# Patient Record
Sex: FEMALE | Race: WHITE | HISPANIC OR LATINO | Employment: OTHER | URBAN - METROPOLITAN AREA
[De-identification: names, ages, dates, MRNs, and addresses within clinical notes are randomized per-mention and may not be internally consistent; named-entity substitution may affect disease eponyms.]

---

## 2017-03-05 ENCOUNTER — HOSPITAL ENCOUNTER (EMERGENCY)
Facility: HOSPITAL | Age: 72
Discharge: HOME | End: 2017-03-05
Attending: EMERGENCY MEDICINE | Admitting: EMERGENCY MEDICINE
Payer: MEDICARE

## 2017-03-05 ENCOUNTER — APPOINTMENT (EMERGENCY)
Dept: RADIOLOGY | Facility: HOSPITAL | Age: 72
End: 2017-03-05
Payer: MEDICARE

## 2017-03-05 VITALS
RESPIRATION RATE: 16 BRPM | HEART RATE: 72 BPM | DIASTOLIC BLOOD PRESSURE: 75 MMHG | SYSTOLIC BLOOD PRESSURE: 140 MMHG | OXYGEN SATURATION: 100 % | TEMPERATURE: 96.8 F | WEIGHT: 105 LBS

## 2017-03-05 DIAGNOSIS — M19.90 OSTEOARTHRITIS: Primary | ICD-10-CM

## 2017-03-05 PROCEDURE — 73700 CT LOWER EXTREMITY W/O DYE: CPT

## 2017-03-05 PROCEDURE — 73502 X-RAY EXAM HIP UNI 2-3 VIEWS: CPT

## 2017-03-05 PROCEDURE — 99284 EMERGENCY DEPT VISIT MOD MDM: CPT

## 2017-03-05 RX ORDER — NORTRIPTYLINE HYDROCHLORIDE 25 MG/1
25 CAPSULE ORAL
Status: ON HOLD | COMMUNITY
Start: 2015-03-13 | End: 2017-11-27 | Stop reason: CLARIF

## 2017-03-05 RX ORDER — GABAPENTIN 100 MG/1
100 CAPSULE ORAL 3 TIMES DAILY
COMMUNITY
Start: 2014-11-02 | End: 2018-05-22 | Stop reason: SDUPTHER

## 2017-03-05 RX ORDER — CYANOCOBALAMIN (VITAMIN B-12) 500 MCG
400 LOZENGE ORAL DAILY
COMMUNITY
Start: 2016-12-13 | End: 2022-05-02

## 2017-03-05 RX ORDER — FAMOTIDINE 20 MG/1
20 TABLET, FILM COATED ORAL EVERY 12 HOURS
COMMUNITY
Start: 2016-12-13 | End: 2017-08-21 | Stop reason: ALTCHOICE

## 2017-03-05 RX ORDER — NORTRIPTYLINE HYDROCHLORIDE 10 MG/1
10 CAPSULE ORAL
Status: ON HOLD | COMMUNITY
Start: 2015-03-13 | End: 2017-11-27 | Stop reason: CLARIF

## 2017-03-05 RX ORDER — LEVOTHYROXINE SODIUM 0.03 MG/1
25 TABLET ORAL DAILY
COMMUNITY
Start: 2016-12-13 | End: 2018-02-21 | Stop reason: SDUPTHER

## 2017-03-05 RX ORDER — DIAZEPAM 5 MG/1
5 TABLET ORAL
Status: ON HOLD | COMMUNITY
Start: 2014-11-02 | End: 2017-11-27 | Stop reason: CLARIF

## 2017-03-05 RX ORDER — OXYCODONE HYDROCHLORIDE AND ACETAMINOPHEN 5; 325 MG/1; MG/1
1 TABLET ORAL ONCE
Status: COMPLETED | OUTPATIENT
Start: 2017-03-05 | End: 2017-03-05

## 2017-03-05 RX ORDER — OXYCODONE HYDROCHLORIDE AND ACETAMINOPHEN 5; 325 MG/1; MG/1
1 TABLET ORAL EVERY 4 HOURS PRN
Qty: 15 TABLET | Refills: 0 | Status: SHIPPED | OUTPATIENT
Start: 2017-03-05 | End: 2017-03-15

## 2017-03-05 RX ORDER — FOLIC ACID 1 MG/1
1 TABLET ORAL DAILY
COMMUNITY
Start: 2015-04-04 | End: 2018-04-10 | Stop reason: SDUPTHER

## 2017-03-05 RX ORDER — LIDOCAINE 50 MG/G
1 PATCH TOPICAL EVERY 24 HOURS
Qty: 30 PATCH | Refills: 0 | Status: SHIPPED | OUTPATIENT
Start: 2017-03-05 | End: 2017-08-29

## 2017-03-05 RX ORDER — RIBOFLAVIN (VITAMIN B2) 100 MG
100 TABLET ORAL DAILY
COMMUNITY
Start: 2016-12-13 | End: 2017-08-21 | Stop reason: DRUGHIGH

## 2017-03-05 RX ORDER — IBUPROFEN 200 MG
600 TABLET ORAL EVERY 8 HOURS PRN
COMMUNITY
Start: 2016-12-13 | End: 2018-05-16

## 2017-03-05 RX ORDER — LIDOCAINE 50 MG/G
1 PATCH TOPICAL ONCE
Status: DISCONTINUED | OUTPATIENT
Start: 2017-03-05 | End: 2017-03-05 | Stop reason: HOSPADM

## 2017-03-05 RX ADMIN — OXYCODONE HYDROCHLORIDE AND ACETAMINOPHEN 1 TABLET: 5; 325 TABLET ORAL at 07:59

## 2017-03-05 RX ADMIN — LIDOCAINE 1 PATCH: 50 PATCH CUTANEOUS at 07:11

## 2017-04-17 ENCOUNTER — HOSPITAL ENCOUNTER (OUTPATIENT)
Dept: RADIOLOGY | Age: 72
Discharge: HOME | End: 2017-04-17
Payer: MEDICARE

## 2017-04-17 DIAGNOSIS — Z13.820 ENCOUNTER FOR SCREENING FOR OSTEOPOROSIS: ICD-10-CM

## 2017-04-17 PROCEDURE — 77080 DXA BONE DENSITY AXIAL: CPT

## 2017-04-20 ENCOUNTER — ALLSCRIPTS OFFICE VISIT (OUTPATIENT)
Dept: OTHER | Facility: OTHER | Age: 72
End: 2017-04-20

## 2017-04-20 ENCOUNTER — TRANSCRIBE ORDERS (OUTPATIENT)
Dept: ADMINISTRATIVE | Facility: HOSPITAL | Age: 72
End: 2017-04-20

## 2017-04-20 DIAGNOSIS — M25.552 LEFT HIP PAIN: Primary | ICD-10-CM

## 2017-04-25 ENCOUNTER — APPOINTMENT (OUTPATIENT)
Dept: LAB | Facility: HOSPITAL | Age: 72
End: 2017-04-25
Payer: MEDICARE

## 2017-04-25 ENCOUNTER — GENERIC CONVERSION - ENCOUNTER (OUTPATIENT)
Dept: OTHER | Facility: OTHER | Age: 72
End: 2017-04-25

## 2017-04-25 ENCOUNTER — TRANSCRIBE ORDERS (OUTPATIENT)
Dept: LAB | Facility: HOSPITAL | Age: 72
End: 2017-04-25

## 2017-04-25 DIAGNOSIS — Z13.820 ENCOUNTER FOR SCREENING FOR OSTEOPOROSIS: ICD-10-CM

## 2017-04-25 DIAGNOSIS — E03.9 HYPOTHYROIDISM: ICD-10-CM

## 2017-04-25 DIAGNOSIS — I73.9 PERIPHERAL VASCULAR DISEASE (HCC): ICD-10-CM

## 2017-04-25 DIAGNOSIS — F41.9 ANXIETY DISORDER: ICD-10-CM

## 2017-04-25 DIAGNOSIS — E05.90 THYROTOXICOSIS WITHOUT THYROID STORM: ICD-10-CM

## 2017-04-25 DIAGNOSIS — K21.9 GASTRO-ESOPHAGEAL REFLUX DISEASE WITHOUT ESOPHAGITIS: ICD-10-CM

## 2017-04-25 LAB
ALBUMIN SERPL BCP-MCNC: 3.4 G/DL (ref 3.5–5)
ALP SERPL-CCNC: 130 U/L (ref 46–116)
ALT SERPL W P-5'-P-CCNC: 24 U/L (ref 12–78)
ANION GAP SERPL CALCULATED.3IONS-SCNC: 8 MMOL/L (ref 4–13)
AST SERPL W P-5'-P-CCNC: 15 U/L (ref 5–45)
BASOPHILS # BLD AUTO: 0.01 THOUSANDS/ΜL (ref 0–0.1)
BASOPHILS NFR BLD AUTO: 0 % (ref 0–1)
BILIRUB SERPL-MCNC: 0.25 MG/DL (ref 0.2–1)
BUN SERPL-MCNC: 10 MG/DL (ref 5–25)
CALCIUM SERPL-MCNC: 9 MG/DL (ref 8.3–10.1)
CHLORIDE SERPL-SCNC: 106 MMOL/L (ref 100–108)
CHOLEST SERPL-MCNC: 242 MG/DL (ref 50–200)
CO2 SERPL-SCNC: 30 MMOL/L (ref 21–32)
CREAT SERPL-MCNC: 0.55 MG/DL (ref 0.6–1.3)
EOSINOPHIL # BLD AUTO: 0.06 THOUSAND/ΜL (ref 0–0.61)
EOSINOPHIL NFR BLD AUTO: 1 % (ref 0–6)
ERYTHROCYTE [DISTWIDTH] IN BLOOD BY AUTOMATED COUNT: 14.4 % (ref 11.6–15.1)
GFR SERPL CREATININE-BSD FRML MDRD: >60 ML/MIN/1.73SQ M
GLUCOSE P FAST SERPL-MCNC: 83 MG/DL (ref 65–99)
HCT VFR BLD AUTO: 41 % (ref 34.8–46.1)
HDLC SERPL-MCNC: 98 MG/DL (ref 40–60)
HGB BLD-MCNC: 12.9 G/DL (ref 11.5–15.4)
LDLC SERPL CALC-MCNC: 125 MG/DL (ref 0–100)
LYMPHOCYTES # BLD AUTO: 1.56 THOUSANDS/ΜL (ref 0.6–4.47)
LYMPHOCYTES NFR BLD AUTO: 24 % (ref 14–44)
MCH RBC QN AUTO: 27.7 PG (ref 26.8–34.3)
MCHC RBC AUTO-ENTMCNC: 31.5 G/DL (ref 31.4–37.4)
MCV RBC AUTO: 88 FL (ref 82–98)
MONOCYTES # BLD AUTO: 0.67 THOUSAND/ΜL (ref 0.17–1.22)
MONOCYTES NFR BLD AUTO: 10 % (ref 4–12)
NEUTROPHILS # BLD AUTO: 4.19 THOUSANDS/ΜL (ref 1.85–7.62)
NEUTS SEG NFR BLD AUTO: 65 % (ref 43–75)
NRBC BLD AUTO-RTO: 0 /100 WBCS
PLATELET # BLD AUTO: 319 THOUSANDS/UL (ref 149–390)
PMV BLD AUTO: 9.2 FL (ref 8.9–12.7)
POTASSIUM SERPL-SCNC: 4.3 MMOL/L (ref 3.5–5.3)
PROT SERPL-MCNC: 7 G/DL (ref 6.4–8.2)
RBC # BLD AUTO: 4.65 MILLION/UL (ref 3.81–5.12)
SODIUM SERPL-SCNC: 144 MMOL/L (ref 136–145)
T4 FREE SERPL-MCNC: 0.86 NG/DL (ref 0.76–1.46)
TRIGL SERPL-MCNC: 94 MG/DL
TSH SERPL DL<=0.05 MIU/L-ACNC: 4.92 UIU/ML (ref 0.36–3.74)
WBC # BLD AUTO: 6.5 THOUSAND/UL (ref 4.31–10.16)

## 2017-04-25 PROCEDURE — 85025 COMPLETE CBC W/AUTO DIFF WBC: CPT

## 2017-04-25 PROCEDURE — 80061 LIPID PANEL: CPT

## 2017-04-25 PROCEDURE — 84439 ASSAY OF FREE THYROXINE: CPT

## 2017-04-25 PROCEDURE — 84443 ASSAY THYROID STIM HORMONE: CPT

## 2017-04-25 PROCEDURE — 80053 COMPREHEN METABOLIC PANEL: CPT

## 2017-04-25 PROCEDURE — 36415 COLL VENOUS BLD VENIPUNCTURE: CPT

## 2017-04-27 ENCOUNTER — HOSPITAL ENCOUNTER (OUTPATIENT)
Dept: RADIOLOGY | Facility: HOSPITAL | Age: 72
Discharge: HOME | End: 2017-04-27
Payer: MEDICARE

## 2017-04-27 DIAGNOSIS — M25.552 LEFT HIP PAIN: ICD-10-CM

## 2017-04-27 PROCEDURE — 20610 DRAIN/INJ JOINT/BURSA W/O US: CPT

## 2017-04-27 PROCEDURE — 77002 NEEDLE LOCALIZATION BY XRAY: CPT

## 2017-04-27 RX ORDER — LIDOCAINE HYDROCHLORIDE 10 MG/ML
20 INJECTION, SOLUTION INFILTRATION; PERINEURAL
Status: DISCONTINUED | OUTPATIENT
Start: 2017-04-27 | End: 2017-04-28 | Stop reason: HOSPADM

## 2017-04-27 RX ORDER — BUPIVACAINE HYDROCHLORIDE 2.5 MG/ML
20 INJECTION, SOLUTION EPIDURAL; INFILTRATION; INTRACAUDAL
Status: DISCONTINUED | OUTPATIENT
Start: 2017-04-27 | End: 2017-04-28 | Stop reason: HOSPADM

## 2017-04-27 RX ORDER — SODIUM BICARBONATE 42 MG/ML
2.4 INJECTION, SOLUTION INTRAVENOUS
Status: DISCONTINUED | OUTPATIENT
Start: 2017-04-27 | End: 2017-04-28 | Stop reason: HOSPADM

## 2017-04-27 RX ADMIN — IOHEXOL 2 ML: 240 INJECTION, SOLUTION INTRATHECAL; INTRAVASCULAR; INTRAVENOUS; ORAL at 13:15

## 2017-05-16 ENCOUNTER — ALLSCRIPTS OFFICE VISIT (OUTPATIENT)
Dept: OTHER | Facility: OTHER | Age: 72
End: 2017-05-16

## 2017-05-16 DIAGNOSIS — F41.9 ANXIETY DISORDER: ICD-10-CM

## 2017-05-16 DIAGNOSIS — Z86.39 PERSONAL HISTORY OF OTHER ENDOCRINE, NUTRITIONAL AND METABOLIC DISEASE: ICD-10-CM

## 2017-05-16 DIAGNOSIS — E03.9 HYPOTHYROIDISM: ICD-10-CM

## 2017-05-16 DIAGNOSIS — M81.0 AGE-RELATED OSTEOPOROSIS WITHOUT CURRENT PATHOLOGICAL FRACTURE: ICD-10-CM

## 2017-05-16 DIAGNOSIS — K21.9 GASTRO-ESOPHAGEAL REFLUX DISEASE WITHOUT ESOPHAGITIS: ICD-10-CM

## 2017-05-25 ENCOUNTER — TRANSCRIBE ORDERS (OUTPATIENT)
Dept: ADMINISTRATIVE | Facility: HOSPITAL | Age: 72
End: 2017-05-25

## 2017-05-25 ENCOUNTER — ALLSCRIPTS OFFICE VISIT (OUTPATIENT)
Dept: OTHER | Facility: OTHER | Age: 72
End: 2017-05-25

## 2017-05-25 DIAGNOSIS — M25.552 LEFT HIP PAIN: Primary | ICD-10-CM

## 2017-07-06 ENCOUNTER — HOSPITAL ENCOUNTER (OUTPATIENT)
Dept: RADIOLOGY | Facility: HOSPITAL | Age: 72
Discharge: HOME | End: 2017-07-06
Attending: ORTHOPAEDIC SURGERY
Payer: MEDICARE

## 2017-07-06 DIAGNOSIS — M25.552 LEFT HIP PAIN: ICD-10-CM

## 2017-07-06 PROCEDURE — 20610 DRAIN/INJ JOINT/BURSA W/O US: CPT

## 2017-07-06 PROCEDURE — 77002 NEEDLE LOCALIZATION BY XRAY: CPT

## 2017-07-06 RX ORDER — BUPIVACAINE HYDROCHLORIDE 2.5 MG/ML
20 INJECTION, SOLUTION EPIDURAL; INFILTRATION; INTRACAUDAL ONCE
Status: CANCELLED | OUTPATIENT
Start: 2017-07-06 | End: 2017-07-06

## 2017-07-06 RX ORDER — METHYLPREDNISOLONE ACETATE 80 MG/ML
80 INJECTION, SUSPENSION INTRA-ARTICULAR; INTRALESIONAL; INTRAMUSCULAR; SOFT TISSUE ONCE
Status: CANCELLED | OUTPATIENT
Start: 2017-07-06 | End: 2017-07-06

## 2017-07-06 RX ORDER — LIDOCAINE HYDROCHLORIDE 10 MG/ML
5 INJECTION, SOLUTION EPIDURAL; INFILTRATION; INTRACAUDAL; PERINEURAL ONCE
Status: CANCELLED | OUTPATIENT
Start: 2017-07-06 | End: 2017-07-06

## 2017-07-27 ENCOUNTER — GENERIC CONVERSION - ENCOUNTER (OUTPATIENT)
Dept: OTHER | Facility: OTHER | Age: 72
End: 2017-07-27

## 2017-07-27 ENCOUNTER — ALLSCRIPTS OFFICE VISIT (OUTPATIENT)
Dept: OTHER | Facility: OTHER | Age: 72
End: 2017-07-27

## 2017-07-27 DIAGNOSIS — M25.552 PAIN IN LEFT HIP: ICD-10-CM

## 2017-07-27 DIAGNOSIS — M16.12 PRIMARY OSTEOARTHRITIS OF LEFT HIP: ICD-10-CM

## 2017-08-21 ENCOUNTER — ALLSCRIPTS OFFICE VISIT (OUTPATIENT)
Dept: OTHER | Facility: OTHER | Age: 72
End: 2017-08-21

## 2017-08-22 ENCOUNTER — APPOINTMENT (OUTPATIENT)
Dept: LAB | Facility: HOSPITAL | Age: 72
End: 2017-08-22
Attending: ORTHOPAEDIC SURGERY
Payer: MEDICARE

## 2017-08-22 ENCOUNTER — TRANSCRIBE ORDERS (OUTPATIENT)
Dept: LAB | Facility: HOSPITAL | Age: 72
End: 2017-08-22

## 2017-08-22 ENCOUNTER — APPOINTMENT (OUTPATIENT)
Dept: PREADMISSION TESTING | Facility: HOSPITAL | Age: 72
End: 2017-08-22
Payer: MEDICARE

## 2017-08-22 ENCOUNTER — HOSPITAL ENCOUNTER (OUTPATIENT)
Dept: RADIOLOGY | Facility: HOSPITAL | Age: 72
Discharge: HOME | End: 2017-08-22
Attending: ORTHOPAEDIC SURGERY
Payer: MEDICARE

## 2017-08-22 DIAGNOSIS — M25.552 PAIN IN LEFT HIP: ICD-10-CM

## 2017-08-22 DIAGNOSIS — M16.12 PRIMARY OSTEOARTHRITIS OF LEFT HIP: ICD-10-CM

## 2017-08-22 DIAGNOSIS — M16.12 PRIMARY OSTEOARTHRITIS OF LEFT HIP: Primary | ICD-10-CM

## 2017-08-22 LAB
ABO GROUP BLD: NORMAL
ALBUMIN SERPL BCP-MCNC: 3.6 G/DL (ref 3.5–5)
ALP SERPL-CCNC: 123 U/L (ref 46–116)
ALT SERPL W P-5'-P-CCNC: 22 U/L (ref 12–78)
ANION GAP SERPL CALCULATED.3IONS-SCNC: 6 MMOL/L (ref 4–13)
APTT PPP: 27 SECONDS (ref 23–35)
AST SERPL W P-5'-P-CCNC: 22 U/L (ref 5–45)
BACTERIA UR QL AUTO: ABNORMAL /HPF
BASOPHILS # BLD AUTO: 0.01 THOUSANDS/ΜL (ref 0–0.1)
BASOPHILS NFR BLD AUTO: 0 % (ref 0–1)
BILIRUB SERPL-MCNC: 0.36 MG/DL (ref 0.2–1)
BILIRUB UR QL STRIP: NEGATIVE
BLD GP AB SCN SERPL QL: NEGATIVE
BUN SERPL-MCNC: 9 MG/DL (ref 5–25)
CALCIUM SERPL-MCNC: 8.9 MG/DL (ref 8.3–10.1)
CHLORIDE SERPL-SCNC: 104 MMOL/L (ref 100–108)
CLARITY UR: CLEAR
CO2 SERPL-SCNC: 29 MMOL/L (ref 21–32)
COLOR UR: YELLOW
CREAT SERPL-MCNC: 0.54 MG/DL (ref 0.6–1.3)
EOSINOPHIL # BLD AUTO: 0.07 THOUSAND/ΜL (ref 0–0.61)
EOSINOPHIL NFR BLD AUTO: 1 % (ref 0–6)
ERYTHROCYTE [DISTWIDTH] IN BLOOD BY AUTOMATED COUNT: 14.8 % (ref 11.6–15.1)
EST. AVERAGE GLUCOSE BLD GHB EST-MCNC: 108 MG/DL
GFR SERPL CREATININE-BSD FRML MDRD: 94 ML/MIN/1.73SQ M
GLUCOSE P FAST SERPL-MCNC: 81 MG/DL (ref 65–99)
GLUCOSE UR STRIP-MCNC: NEGATIVE MG/DL
HBA1C MFR BLD: 5.4 % (ref 4.2–6.3)
HCT VFR BLD AUTO: 39.1 % (ref 34.8–46.1)
HGB BLD-MCNC: 12.6 G/DL (ref 11.5–15.4)
HGB UR QL STRIP.AUTO: NEGATIVE
HYALINE CASTS #/AREA URNS LPF: ABNORMAL /LPF
INR PPP: 0.94 (ref 0.86–1.16)
KETONES UR STRIP-MCNC: NEGATIVE MG/DL
LEUKOCYTE ESTERASE UR QL STRIP: ABNORMAL
LYMPHOCYTES # BLD AUTO: 1.72 THOUSANDS/ΜL (ref 0.6–4.47)
LYMPHOCYTES NFR BLD AUTO: 22 % (ref 14–44)
MCH RBC QN AUTO: 27.7 PG (ref 26.8–34.3)
MCHC RBC AUTO-ENTMCNC: 32.2 G/DL (ref 31.4–37.4)
MCV RBC AUTO: 86 FL (ref 82–98)
MONOCYTES # BLD AUTO: 0.86 THOUSAND/ΜL (ref 0.17–1.22)
MONOCYTES NFR BLD AUTO: 11 % (ref 4–12)
NEUTROPHILS # BLD AUTO: 5.13 THOUSANDS/ΜL (ref 1.85–7.62)
NEUTS SEG NFR BLD AUTO: 66 % (ref 43–75)
NITRITE UR QL STRIP: NEGATIVE
NON-SQ EPI CELLS URNS QL MICRO: ABNORMAL /HPF
NRBC BLD AUTO-RTO: 0 /100 WBCS
PH UR STRIP.AUTO: 7.5 [PH] (ref 4.5–8)
PLATELET # BLD AUTO: 332 THOUSANDS/UL (ref 149–390)
PMV BLD AUTO: 8.9 FL (ref 8.9–12.7)
POTASSIUM SERPL-SCNC: 4.2 MMOL/L (ref 3.5–5.3)
PROT SERPL-MCNC: 7.3 G/DL (ref 6.4–8.2)
PROT UR STRIP-MCNC: NEGATIVE MG/DL
PROTHROMBIN TIME: 12.6 SECONDS (ref 12.1–14.4)
RBC # BLD AUTO: 4.55 MILLION/UL (ref 3.81–5.12)
RBC #/AREA URNS AUTO: ABNORMAL /HPF
RH BLD: POSITIVE
SODIUM SERPL-SCNC: 139 MMOL/L (ref 136–145)
SP GR UR STRIP.AUTO: 1.01 (ref 1–1.03)
SPECIMEN EXPIRATION DATE: NORMAL
UROBILINOGEN UR QL STRIP.AUTO: 0.2 E.U./DL
WBC # BLD AUTO: 7.8 THOUSAND/UL (ref 4.31–10.16)
WBC #/AREA URNS AUTO: ABNORMAL /HPF

## 2017-08-22 PROCEDURE — 36415 COLL VENOUS BLD VENIPUNCTURE: CPT

## 2017-08-22 PROCEDURE — 81001 URINALYSIS AUTO W/SCOPE: CPT

## 2017-08-22 PROCEDURE — 71020 HB CHEST X-RAY 2VW FRONTAL&LATL: CPT

## 2017-08-22 PROCEDURE — 85730 THROMBOPLASTIN TIME PARTIAL: CPT

## 2017-08-22 PROCEDURE — 86850 RBC ANTIBODY SCREEN: CPT

## 2017-08-22 PROCEDURE — 80053 COMPREHEN METABOLIC PANEL: CPT

## 2017-08-22 PROCEDURE — 85610 PROTHROMBIN TIME: CPT

## 2017-08-22 PROCEDURE — 86900 BLOOD TYPING SEROLOGIC ABO: CPT

## 2017-08-22 PROCEDURE — 85025 COMPLETE CBC W/AUTO DIFF WBC: CPT

## 2017-08-22 PROCEDURE — 86901 BLOOD TYPING SEROLOGIC RH(D): CPT

## 2017-08-22 PROCEDURE — 83036 HEMOGLOBIN GLYCOSYLATED A1C: CPT

## 2017-08-29 ENCOUNTER — APPOINTMENT (EMERGENCY)
Dept: RADIOLOGY | Facility: HOSPITAL | Age: 72
DRG: 690 | End: 2017-08-29
Payer: MEDICARE

## 2017-08-29 ENCOUNTER — HOSPITAL ENCOUNTER (INPATIENT)
Facility: HOSPITAL | Age: 72
LOS: 1 days | Discharge: HOME HEALTH | DRG: 690 | End: 2017-09-01
Attending: EMERGENCY MEDICINE | Admitting: HOSPITALIST
Payer: MEDICARE

## 2017-08-29 DIAGNOSIS — R42 VERTIGO: Primary | ICD-10-CM

## 2017-08-29 LAB
ALBUMIN SERPL BCP-MCNC: 3.5 G/DL (ref 3.5–5)
ALP SERPL-CCNC: 132 U/L (ref 46–116)
ALT SERPL W P-5'-P-CCNC: 23 U/L (ref 12–78)
ANION GAP SERPL CALCULATED.3IONS-SCNC: 4 MMOL/L (ref 4–13)
AST SERPL W P-5'-P-CCNC: 20 U/L (ref 5–45)
BASOPHILS # BLD AUTO: 0.02 THOUSANDS/ΜL (ref 0–0.1)
BASOPHILS NFR BLD AUTO: 0 % (ref 0–1)
BILIRUB SERPL-MCNC: 0.3 MG/DL (ref 0.2–1)
BUN SERPL-MCNC: 11 MG/DL (ref 5–25)
CALCIUM SERPL-MCNC: 9.4 MG/DL (ref 8.3–10.1)
CHLORIDE SERPL-SCNC: 104 MMOL/L (ref 100–108)
CO2 SERPL-SCNC: 31 MMOL/L (ref 21–32)
CREAT SERPL-MCNC: 0.62 MG/DL (ref 0.6–1.3)
EOSINOPHIL # BLD AUTO: 0.02 THOUSAND/ΜL (ref 0–0.61)
EOSINOPHIL NFR BLD AUTO: 0 % (ref 0–6)
ERYTHROCYTE [DISTWIDTH] IN BLOOD BY AUTOMATED COUNT: 14.6 % (ref 11.6–15.1)
GFR SERPL CREATININE-BSD FRML MDRD: 90 ML/MIN/1.73SQ M
GLUCOSE SERPL-MCNC: 120 MG/DL (ref 65–140)
HCT VFR BLD AUTO: 39.6 % (ref 34.8–46.1)
HGB BLD-MCNC: 12.9 G/DL (ref 11.5–15.4)
LIPASE SERPL-CCNC: 151 U/L (ref 73–393)
LYMPHOCYTES # BLD AUTO: 0.88 THOUSANDS/ΜL (ref 0.6–4.47)
LYMPHOCYTES NFR BLD AUTO: 9 % (ref 14–44)
MCH RBC QN AUTO: 28 PG (ref 26.8–34.3)
MCHC RBC AUTO-ENTMCNC: 32.6 G/DL (ref 31.4–37.4)
MCV RBC AUTO: 86 FL (ref 82–98)
MONOCYTES # BLD AUTO: 0.51 THOUSAND/ΜL (ref 0.17–1.22)
MONOCYTES NFR BLD AUTO: 5 % (ref 4–12)
NEUTROPHILS # BLD AUTO: 8.37 THOUSANDS/ΜL (ref 1.85–7.62)
NEUTS SEG NFR BLD AUTO: 86 % (ref 43–75)
NRBC BLD AUTO-RTO: 0 /100 WBCS
PLATELET # BLD AUTO: 299 THOUSANDS/UL (ref 149–390)
PMV BLD AUTO: 9 FL (ref 8.9–12.7)
POTASSIUM SERPL-SCNC: 4 MMOL/L (ref 3.5–5.3)
PROT SERPL-MCNC: 7.3 G/DL (ref 6.4–8.2)
RBC # BLD AUTO: 4.6 MILLION/UL (ref 3.81–5.12)
SODIUM SERPL-SCNC: 139 MMOL/L (ref 136–145)
TROPONIN I SERPL-MCNC: <0.02 NG/ML
WBC # BLD AUTO: 9.82 THOUSAND/UL (ref 4.31–10.16)

## 2017-08-29 PROCEDURE — 36415 COLL VENOUS BLD VENIPUNCTURE: CPT | Performed by: STUDENT IN AN ORGANIZED HEALTH CARE EDUCATION/TRAINING PROGRAM

## 2017-08-29 PROCEDURE — 80053 COMPREHEN METABOLIC PANEL: CPT | Performed by: STUDENT IN AN ORGANIZED HEALTH CARE EDUCATION/TRAINING PROGRAM

## 2017-08-29 PROCEDURE — 83690 ASSAY OF LIPASE: CPT | Performed by: STUDENT IN AN ORGANIZED HEALTH CARE EDUCATION/TRAINING PROGRAM

## 2017-08-29 PROCEDURE — 96374 THER/PROPH/DIAG INJ IV PUSH: CPT

## 2017-08-29 PROCEDURE — 71020 HB CHEST X-RAY 2VW FRONTAL&LATL: CPT

## 2017-08-29 PROCEDURE — 70498 CT ANGIOGRAPHY NECK: CPT

## 2017-08-29 PROCEDURE — 85025 COMPLETE CBC W/AUTO DIFF WBC: CPT | Performed by: STUDENT IN AN ORGANIZED HEALTH CARE EDUCATION/TRAINING PROGRAM

## 2017-08-29 PROCEDURE — 93005 ELECTROCARDIOGRAM TRACING: CPT | Performed by: STUDENT IN AN ORGANIZED HEALTH CARE EDUCATION/TRAINING PROGRAM

## 2017-08-29 PROCEDURE — 84484 ASSAY OF TROPONIN QUANT: CPT | Performed by: STUDENT IN AN ORGANIZED HEALTH CARE EDUCATION/TRAINING PROGRAM

## 2017-08-29 PROCEDURE — 70496 CT ANGIOGRAPHY HEAD: CPT

## 2017-08-29 RX ORDER — MECLIZINE HYDROCHLORIDE 25 MG/1
25 TABLET ORAL ONCE
Status: COMPLETED | OUTPATIENT
Start: 2017-08-29 | End: 2017-08-29

## 2017-08-29 RX ORDER — IBUPROFEN 400 MG/1
400 TABLET ORAL ONCE
Status: COMPLETED | OUTPATIENT
Start: 2017-08-29 | End: 2017-08-29

## 2017-08-29 RX ORDER — ONDANSETRON 2 MG/ML
4 INJECTION INTRAMUSCULAR; INTRAVENOUS ONCE
Status: COMPLETED | OUTPATIENT
Start: 2017-08-29 | End: 2017-08-29

## 2017-08-29 RX ADMIN — IOHEXOL 85 ML: 350 INJECTION, SOLUTION INTRAVENOUS at 23:07

## 2017-08-29 RX ADMIN — IBUPROFEN 400 MG: 400 TABLET, FILM COATED ORAL at 22:33

## 2017-08-29 RX ADMIN — MECLIZINE HYDROCHLORIDE 25 MG: 25 TABLET ORAL at 21:45

## 2017-08-29 RX ADMIN — ONDANSETRON 4 MG: 2 INJECTION INTRAMUSCULAR; INTRAVENOUS at 21:45

## 2017-08-30 ENCOUNTER — APPOINTMENT (OUTPATIENT)
Dept: RADIOLOGY | Facility: HOSPITAL | Age: 72
DRG: 690 | End: 2017-08-30
Payer: MEDICARE

## 2017-08-30 ENCOUNTER — APPOINTMENT (OUTPATIENT)
Dept: NON INVASIVE DIAGNOSTICS | Facility: HOSPITAL | Age: 72
DRG: 690 | End: 2017-08-30
Payer: MEDICARE

## 2017-08-30 PROBLEM — R42 DIZZINESS: Status: ACTIVE | Noted: 2017-08-30

## 2017-08-30 PROBLEM — G25.81 RLS (RESTLESS LEGS SYNDROME): Status: ACTIVE | Noted: 2017-08-30

## 2017-08-30 PROBLEM — K21.9 GERD (GASTROESOPHAGEAL REFLUX DISEASE): Status: ACTIVE | Noted: 2017-08-30

## 2017-08-30 PROBLEM — F41.9 ANXIETY: Status: ACTIVE | Noted: 2017-08-30

## 2017-08-30 PROBLEM — I73.9 PVD (PERIPHERAL VASCULAR DISEASE) (HCC): Status: ACTIVE | Noted: 2017-08-30

## 2017-08-30 PROBLEM — R10.13 EPIGASTRIC DISCOMFORT: Status: ACTIVE | Noted: 2017-08-30

## 2017-08-30 PROBLEM — F41.9 ANXIETY: Chronic | Status: ACTIVE | Noted: 2017-08-30

## 2017-08-30 PROBLEM — I73.9 PVD (PERIPHERAL VASCULAR DISEASE) (HCC): Chronic | Status: ACTIVE | Noted: 2017-08-30

## 2017-08-30 PROBLEM — N39.0 URINARY TRACT INFECTION: Status: ACTIVE | Noted: 2017-08-30

## 2017-08-30 LAB
ANION GAP SERPL CALCULATED.3IONS-SCNC: 8 MMOL/L (ref 4–13)
ATRIAL RATE: 64 BPM
BACTERIA UR QL AUTO: ABNORMAL /HPF
BILIRUB UR QL STRIP: NEGATIVE
BUN SERPL-MCNC: 8 MG/DL (ref 5–25)
CALCIUM SERPL-MCNC: 9 MG/DL (ref 8.3–10.1)
CHLORIDE SERPL-SCNC: 102 MMOL/L (ref 100–108)
CLARITY UR: ABNORMAL
CO2 SERPL-SCNC: 28 MMOL/L (ref 21–32)
COLOR UR: YELLOW
COLOR, POC: NORMAL
CREAT SERPL-MCNC: 0.63 MG/DL (ref 0.6–1.3)
GFR SERPL CREATININE-BSD FRML MDRD: 89 ML/MIN/1.73SQ M
GLUCOSE SERPL-MCNC: 118 MG/DL (ref 65–140)
GLUCOSE UR STRIP-MCNC: NEGATIVE MG/DL
HGB UR QL STRIP.AUTO: ABNORMAL
HYALINE CASTS #/AREA URNS LPF: ABNORMAL /LPF
KETONES UR STRIP-MCNC: NEGATIVE MG/DL
LEUKOCYTE ESTERASE UR QL STRIP: ABNORMAL
NITRITE UR QL STRIP: POSITIVE
NON-SQ EPI CELLS URNS QL MICRO: ABNORMAL /HPF
P AXIS: 69 DEGREES
PH UR STRIP.AUTO: 8.5 [PH] (ref 4.5–8)
POTASSIUM SERPL-SCNC: 4.1 MMOL/L (ref 3.5–5.3)
PR INTERVAL: 128 MS
PROT UR STRIP-MCNC: NEGATIVE MG/DL
QRS AXIS: 87 DEGREES
QRSD INTERVAL: 120 MS
QT INTERVAL: 434 MS
QTC INTERVAL: 447 MS
RBC #/AREA URNS AUTO: ABNORMAL /HPF
SODIUM SERPL-SCNC: 138 MMOL/L (ref 136–145)
SP GR UR STRIP.AUTO: 1.01 (ref 1–1.03)
T WAVE AXIS: 69 DEGREES
TROPONIN I SERPL-MCNC: <0.02 NG/ML
TROPONIN I SERPL-MCNC: <0.02 NG/ML
TSH SERPL DL<=0.05 MIU/L-ACNC: 6.51 UIU/ML (ref 0.36–3.74)
UROBILINOGEN UR QL STRIP.AUTO: 0.2 E.U./DL
VENTRICULAR RATE: 64 BPM
WBC #/AREA URNS AUTO: ABNORMAL /HPF

## 2017-08-30 PROCEDURE — 80048 BASIC METABOLIC PNL TOTAL CA: CPT | Performed by: HOSPITALIST

## 2017-08-30 PROCEDURE — 70553 MRI BRAIN STEM W/O & W/DYE: CPT

## 2017-08-30 PROCEDURE — A9577 INJ MULTIHANCE: HCPCS | Performed by: HOSPITALIST

## 2017-08-30 PROCEDURE — 70549 MR ANGIOGRAPH NECK W/O&W/DYE: CPT

## 2017-08-30 PROCEDURE — 84484 ASSAY OF TROPONIN QUANT: CPT | Performed by: HOSPITALIST

## 2017-08-30 PROCEDURE — 87186 SC STD MICRODIL/AGAR DIL: CPT

## 2017-08-30 PROCEDURE — 99285 EMERGENCY DEPT VISIT HI MDM: CPT

## 2017-08-30 PROCEDURE — 70544 MR ANGIOGRAPHY HEAD W/O DYE: CPT

## 2017-08-30 PROCEDURE — 36415 COLL VENOUS BLD VENIPUNCTURE: CPT | Performed by: HOSPITALIST

## 2017-08-30 PROCEDURE — 87077 CULTURE AEROBIC IDENTIFY: CPT

## 2017-08-30 PROCEDURE — 87086 URINE CULTURE/COLONY COUNT: CPT

## 2017-08-30 PROCEDURE — 93306 TTE W/DOPPLER COMPLETE: CPT

## 2017-08-30 PROCEDURE — 84443 ASSAY THYROID STIM HORMONE: CPT | Performed by: PHYSICIAN ASSISTANT

## 2017-08-30 PROCEDURE — 84439 ASSAY OF FREE THYROXINE: CPT | Performed by: NURSE PRACTITIONER

## 2017-08-30 PROCEDURE — 81001 URINALYSIS AUTO W/SCOPE: CPT

## 2017-08-30 PROCEDURE — 81002 URINALYSIS NONAUTO W/O SCOPE: CPT | Performed by: EMERGENCY MEDICINE

## 2017-08-30 RX ORDER — NORTRIPTYLINE HYDROCHLORIDE 25 MG/1
25 CAPSULE ORAL
Status: DISCONTINUED | OUTPATIENT
Start: 2017-08-30 | End: 2017-09-01 | Stop reason: HOSPADM

## 2017-08-30 RX ORDER — ASCORBIC ACID 500 MG
1000 TABLET ORAL DAILY
Status: DISCONTINUED | OUTPATIENT
Start: 2017-08-30 | End: 2017-09-01 | Stop reason: HOSPADM

## 2017-08-30 RX ORDER — MECLIZINE HCL 12.5 MG/1
12.5 TABLET ORAL EVERY 8 HOURS SCHEDULED
Status: DISCONTINUED | OUTPATIENT
Start: 2017-08-30 | End: 2017-08-31

## 2017-08-30 RX ORDER — ONDANSETRON 2 MG/ML
4 INJECTION INTRAMUSCULAR; INTRAVENOUS EVERY 6 HOURS PRN
Status: DISCONTINUED | OUTPATIENT
Start: 2017-08-30 | End: 2017-09-01 | Stop reason: HOSPADM

## 2017-08-30 RX ORDER — LEVOTHYROXINE SODIUM 0.03 MG/1
25 TABLET ORAL
Status: DISCONTINUED | OUTPATIENT
Start: 2017-08-30 | End: 2017-09-01 | Stop reason: HOSPADM

## 2017-08-30 RX ORDER — ALENDRONATE SODIUM 70 MG/1
70 TABLET ORAL
Status: DISCONTINUED | OUTPATIENT
Start: 2017-08-30 | End: 2017-08-30 | Stop reason: SINTOL

## 2017-08-30 RX ORDER — LORAZEPAM 1 MG/1
1 TABLET ORAL ONCE
Status: COMPLETED | OUTPATIENT
Start: 2017-08-30 | End: 2017-08-30

## 2017-08-30 RX ORDER — LABETALOL HYDROCHLORIDE 5 MG/ML
10 INJECTION, SOLUTION INTRAVENOUS EVERY 4 HOURS PRN
Status: DISCONTINUED | OUTPATIENT
Start: 2017-08-30 | End: 2017-09-01 | Stop reason: HOSPADM

## 2017-08-30 RX ORDER — IBUPROFEN 400 MG/1
400 TABLET ORAL EVERY 6 HOURS PRN
Status: DISCONTINUED | OUTPATIENT
Start: 2017-08-30 | End: 2017-09-01 | Stop reason: HOSPADM

## 2017-08-30 RX ORDER — VITAMIN E 268 MG
400 CAPSULE ORAL 2 TIMES DAILY
Status: DISCONTINUED | OUTPATIENT
Start: 2017-08-30 | End: 2017-09-01 | Stop reason: HOSPADM

## 2017-08-30 RX ORDER — FOLIC ACID 1 MG/1
1 TABLET ORAL DAILY
Status: DISCONTINUED | OUTPATIENT
Start: 2017-08-30 | End: 2017-09-01 | Stop reason: HOSPADM

## 2017-08-30 RX ORDER — ASPIRIN 81 MG/1
TABLET, CHEWABLE ORAL
Status: DISPENSED
Start: 2017-08-30 | End: 2017-08-30

## 2017-08-30 RX ORDER — MELATONIN
1000 DAILY
Status: DISCONTINUED | OUTPATIENT
Start: 2017-08-30 | End: 2017-09-01 | Stop reason: HOSPADM

## 2017-08-30 RX ORDER — SODIUM CHLORIDE 9 MG/ML
75 INJECTION, SOLUTION INTRAVENOUS CONTINUOUS
Status: DISPENSED | OUTPATIENT
Start: 2017-08-30 | End: 2017-08-30

## 2017-08-30 RX ORDER — AMLODIPINE BESYLATE 2.5 MG/1
2.5 TABLET ORAL DAILY
Status: DISCONTINUED | OUTPATIENT
Start: 2017-08-30 | End: 2017-09-01 | Stop reason: HOSPADM

## 2017-08-30 RX ORDER — GABAPENTIN 100 MG/1
100 CAPSULE ORAL 2 TIMES DAILY
Status: DISCONTINUED | OUTPATIENT
Start: 2017-08-30 | End: 2017-09-01 | Stop reason: HOSPADM

## 2017-08-30 RX ORDER — NORTRIPTYLINE HYDROCHLORIDE 10 MG/1
10 CAPSULE ORAL
Status: DISCONTINUED | OUTPATIENT
Start: 2017-08-30 | End: 2017-09-01 | Stop reason: HOSPADM

## 2017-08-30 RX ORDER — ASPIRIN 81 MG/1
81 TABLET, CHEWABLE ORAL DAILY
Status: DISCONTINUED | OUTPATIENT
Start: 2017-08-30 | End: 2017-09-01 | Stop reason: HOSPADM

## 2017-08-30 RX ORDER — DIAZEPAM 2 MG/1
2 TABLET ORAL
Status: DISCONTINUED | OUTPATIENT
Start: 2017-08-30 | End: 2017-09-01 | Stop reason: HOSPADM

## 2017-08-30 RX ORDER — ACETAMINOPHEN 325 MG/1
650 TABLET ORAL EVERY 6 HOURS PRN
Status: DISCONTINUED | OUTPATIENT
Start: 2017-08-30 | End: 2017-09-01 | Stop reason: HOSPADM

## 2017-08-30 RX ORDER — TRAMADOL HYDROCHLORIDE 50 MG/1
50 TABLET ORAL EVERY 6 HOURS PRN
Status: DISCONTINUED | OUTPATIENT
Start: 2017-08-30 | End: 2017-09-01 | Stop reason: HOSPADM

## 2017-08-30 RX ORDER — SENNOSIDES 8.6 MG
1 TABLET ORAL DAILY
Status: DISCONTINUED | OUTPATIENT
Start: 2017-08-30 | End: 2017-09-01 | Stop reason: HOSPADM

## 2017-08-30 RX ORDER — PANTOPRAZOLE SODIUM 40 MG/1
40 TABLET, DELAYED RELEASE ORAL
Status: DISCONTINUED | OUTPATIENT
Start: 2017-08-30 | End: 2017-09-01 | Stop reason: HOSPADM

## 2017-08-30 RX ORDER — DOCUSATE SODIUM 100 MG/1
100 CAPSULE, LIQUID FILLED ORAL 2 TIMES DAILY
Status: DISCONTINUED | OUTPATIENT
Start: 2017-08-30 | End: 2017-09-01 | Stop reason: HOSPADM

## 2017-08-30 RX ADMIN — IBUPROFEN 400 MG: 400 TABLET, FILM COATED ORAL at 21:37

## 2017-08-30 RX ADMIN — DIAZEPAM 2 MG: 2 TABLET ORAL at 20:28

## 2017-08-30 RX ADMIN — NORTRIPTYLINE HYDROCHLORIDE 25 MG: 25 CAPSULE ORAL at 21:38

## 2017-08-30 RX ADMIN — DOCUSATE SODIUM 100 MG: 100 CAPSULE, LIQUID FILLED ORAL at 17:28

## 2017-08-30 RX ADMIN — NORTRIPTYLINE HYDROCHLORIDE 10 MG: 10 CAPSULE ORAL at 02:34

## 2017-08-30 RX ADMIN — MECLIZINE HCL 12.5 MG: 12.5 TABLET ORAL at 21:37

## 2017-08-30 RX ADMIN — ACETAMINOPHEN 650 MG: 325 TABLET, FILM COATED ORAL at 04:10

## 2017-08-30 RX ADMIN — SENNOSIDES 8.6 MG: 8.6 TABLET, FILM COATED ORAL at 09:28

## 2017-08-30 RX ADMIN — GABAPENTIN 100 MG: 100 CAPSULE ORAL at 17:28

## 2017-08-30 RX ADMIN — PANTOPRAZOLE SODIUM 40 MG: 40 TABLET, DELAYED RELEASE ORAL at 14:06

## 2017-08-30 RX ADMIN — GABAPENTIN 100 MG: 100 CAPSULE ORAL at 09:30

## 2017-08-30 RX ADMIN — IBUPROFEN 400 MG: 400 TABLET, FILM COATED ORAL at 11:00

## 2017-08-30 RX ADMIN — DOCUSATE SODIUM 100 MG: 100 CAPSULE, LIQUID FILLED ORAL at 09:31

## 2017-08-30 RX ADMIN — GADOBENATE DIMEGLUMINE 9 ML: 529 INJECTION, SOLUTION INTRAVENOUS at 22:57

## 2017-08-30 RX ADMIN — PSYLLIUM HUSK 1 PACKET: 3.4 POWDER ORAL at 11:07

## 2017-08-30 RX ADMIN — AMLODIPINE BESYLATE 2.5 MG: 2.5 TABLET ORAL at 10:59

## 2017-08-30 RX ADMIN — LABETALOL 20 MG/4 ML (5 MG/ML) INTRAVENOUS SYRINGE 10 MG: at 01:48

## 2017-08-30 RX ADMIN — LEVOTHYROXINE SODIUM 25 MCG: 25 TABLET ORAL at 06:35

## 2017-08-30 RX ADMIN — NORTRIPTYLINE HYDROCHLORIDE 10 MG: 10 CAPSULE ORAL at 21:37

## 2017-08-30 RX ADMIN — OXYCODONE HYDROCHLORIDE AND ACETAMINOPHEN 1000 MG: 500 TABLET ORAL at 09:29

## 2017-08-30 RX ADMIN — FOLIC ACID 1 MG: 1 TABLET ORAL at 09:30

## 2017-08-30 RX ADMIN — LORAZEPAM 1 MG: 1 TABLET ORAL at 02:55

## 2017-08-30 RX ADMIN — VITAMIN D, TAB 1000IU (100/BT) 1000 UNITS: 25 TAB at 09:31

## 2017-08-30 RX ADMIN — NORTRIPTYLINE HYDROCHLORIDE 25 MG: 25 CAPSULE ORAL at 02:34

## 2017-08-30 RX ADMIN — CEFTRIAXONE 1000 MG: 1 INJECTION, POWDER, FOR SOLUTION INTRAMUSCULAR; INTRAVENOUS at 09:38

## 2017-08-30 RX ADMIN — VITAMIN E CAP 400 UNIT 400 UNITS: 400 CAP at 09:28

## 2017-08-30 RX ADMIN — ONDANSETRON 4 MG: 2 INJECTION INTRAMUSCULAR; INTRAVENOUS at 03:23

## 2017-08-30 RX ADMIN — MECLIZINE HCL 12.5 MG: 12.5 TABLET ORAL at 14:06

## 2017-08-30 RX ADMIN — Medication 1 TABLET: at 09:30

## 2017-08-30 RX ADMIN — CEFTRIAXONE 1000 MG: 1 INJECTION, POWDER, FOR SOLUTION INTRAMUSCULAR; INTRAVENOUS at 05:20

## 2017-08-30 RX ADMIN — ENOXAPARIN SODIUM 40 MG: 40 INJECTION SUBCUTANEOUS at 09:35

## 2017-08-30 RX ADMIN — SODIUM CHLORIDE 75 ML/HR: 0.9 INJECTION, SOLUTION INTRAVENOUS at 02:57

## 2017-08-30 RX ADMIN — TRAMADOL HYDROCHLORIDE 50 MG: 50 TABLET, FILM COATED ORAL at 05:15

## 2017-08-30 RX ADMIN — DIAZEPAM 2 MG: 2 TABLET ORAL at 04:11

## 2017-08-31 LAB — T4 FREE SERPL-MCNC: 0.84 NG/DL (ref 0.76–1.46)

## 2017-08-31 PROCEDURE — G8988 SELF CARE GOAL STATUS: HCPCS

## 2017-08-31 PROCEDURE — 97167 OT EVAL HIGH COMPLEX 60 MIN: CPT

## 2017-08-31 PROCEDURE — 97163 PT EVAL HIGH COMPLEX 45 MIN: CPT

## 2017-08-31 PROCEDURE — G8979 MOBILITY GOAL STATUS: HCPCS

## 2017-08-31 PROCEDURE — G8987 SELF CARE CURRENT STATUS: HCPCS

## 2017-08-31 PROCEDURE — G8978 MOBILITY CURRENT STATUS: HCPCS

## 2017-08-31 RX ORDER — MECLIZINE HCL 12.5 MG/1
12.5 TABLET ORAL EVERY 8 HOURS PRN
Status: DISCONTINUED | OUTPATIENT
Start: 2017-08-31 | End: 2017-09-01 | Stop reason: HOSPADM

## 2017-08-31 RX ADMIN — VITAMIN D, TAB 1000IU (100/BT) 1000 UNITS: 25 TAB at 09:17

## 2017-08-31 RX ADMIN — NORTRIPTYLINE HYDROCHLORIDE 25 MG: 25 CAPSULE ORAL at 21:28

## 2017-08-31 RX ADMIN — GABAPENTIN 100 MG: 100 CAPSULE ORAL at 09:18

## 2017-08-31 RX ADMIN — IBUPROFEN 400 MG: 400 TABLET, FILM COATED ORAL at 16:22

## 2017-08-31 RX ADMIN — SENNOSIDES 8.6 MG: 8.6 TABLET, FILM COATED ORAL at 09:17

## 2017-08-31 RX ADMIN — CEFTRIAXONE 1000 MG: 1 INJECTION, POWDER, FOR SOLUTION INTRAMUSCULAR; INTRAVENOUS at 09:19

## 2017-08-31 RX ADMIN — DOCUSATE SODIUM 100 MG: 100 CAPSULE, LIQUID FILLED ORAL at 17:38

## 2017-08-31 RX ADMIN — NORTRIPTYLINE HYDROCHLORIDE 10 MG: 10 CAPSULE ORAL at 21:28

## 2017-08-31 RX ADMIN — MECLIZINE HCL 12.5 MG: 12.5 TABLET ORAL at 05:27

## 2017-08-31 RX ADMIN — DIAZEPAM 2 MG: 2 TABLET ORAL at 21:50

## 2017-08-31 RX ADMIN — PANTOPRAZOLE SODIUM 40 MG: 40 TABLET, DELAYED RELEASE ORAL at 05:27

## 2017-08-31 RX ADMIN — LEVOTHYROXINE SODIUM 25 MCG: 25 TABLET ORAL at 05:27

## 2017-08-31 RX ADMIN — GABAPENTIN 100 MG: 100 CAPSULE ORAL at 17:39

## 2017-08-31 RX ADMIN — DOCUSATE SODIUM 100 MG: 100 CAPSULE, LIQUID FILLED ORAL at 09:16

## 2017-08-31 RX ADMIN — AMLODIPINE BESYLATE 2.5 MG: 2.5 TABLET ORAL at 09:16

## 2017-08-31 RX ADMIN — MECLIZINE HCL 12.5 MG: 12.5 TABLET ORAL at 12:39

## 2017-08-31 RX ADMIN — FOLIC ACID 1 MG: 1 TABLET ORAL at 09:16

## 2017-08-31 RX ADMIN — ENOXAPARIN SODIUM 40 MG: 40 INJECTION SUBCUTANEOUS at 09:17

## 2017-08-31 RX ADMIN — ASPIRIN 81 MG 81 MG: 81 TABLET ORAL at 09:16

## 2017-08-31 RX ADMIN — PSYLLIUM HUSK 1 PACKET: 3.4 POWDER ORAL at 09:16

## 2017-09-01 VITALS
HEIGHT: 60 IN | OXYGEN SATURATION: 98 % | WEIGHT: 102.51 LBS | HEART RATE: 78 BPM | TEMPERATURE: 98 F | BODY MASS INDEX: 20.13 KG/M2 | RESPIRATION RATE: 16 BRPM | SYSTOLIC BLOOD PRESSURE: 119 MMHG | DIASTOLIC BLOOD PRESSURE: 66 MMHG

## 2017-09-01 LAB
ANION GAP SERPL CALCULATED.3IONS-SCNC: 7 MMOL/L (ref 4–13)
BACTERIA UR CULT: NORMAL
BUN SERPL-MCNC: 8 MG/DL (ref 5–25)
CALCIUM SERPL-MCNC: 9 MG/DL (ref 8.3–10.1)
CHLORIDE SERPL-SCNC: 102 MMOL/L (ref 100–108)
CO2 SERPL-SCNC: 29 MMOL/L (ref 21–32)
CREAT SERPL-MCNC: 0.58 MG/DL (ref 0.6–1.3)
ERYTHROCYTE [DISTWIDTH] IN BLOOD BY AUTOMATED COUNT: 14.8 % (ref 11.6–15.1)
GFR SERPL CREATININE-BSD FRML MDRD: 92 ML/MIN/1.73SQ M
GLUCOSE SERPL-MCNC: 85 MG/DL (ref 65–140)
HCT VFR BLD AUTO: 37.5 % (ref 34.8–46.1)
HGB BLD-MCNC: 11.9 G/DL (ref 11.5–15.4)
MAGNESIUM SERPL-MCNC: 2.5 MG/DL (ref 1.6–2.6)
MCH RBC QN AUTO: 27.7 PG (ref 26.8–34.3)
MCHC RBC AUTO-ENTMCNC: 31.7 G/DL (ref 31.4–37.4)
MCV RBC AUTO: 87 FL (ref 82–98)
PLATELET # BLD AUTO: 289 THOUSANDS/UL (ref 149–390)
PMV BLD AUTO: 9 FL (ref 8.9–12.7)
POTASSIUM SERPL-SCNC: 4.2 MMOL/L (ref 3.5–5.3)
RBC # BLD AUTO: 4.29 MILLION/UL (ref 3.81–5.12)
SODIUM SERPL-SCNC: 138 MMOL/L (ref 136–145)
WBC # BLD AUTO: 5.17 THOUSAND/UL (ref 4.31–10.16)

## 2017-09-01 PROCEDURE — 83735 ASSAY OF MAGNESIUM: CPT | Performed by: NURSE PRACTITIONER

## 2017-09-01 PROCEDURE — 97535 SELF CARE MNGMENT TRAINING: CPT

## 2017-09-01 PROCEDURE — 85027 COMPLETE CBC AUTOMATED: CPT | Performed by: NURSE PRACTITIONER

## 2017-09-01 PROCEDURE — 80048 BASIC METABOLIC PNL TOTAL CA: CPT | Performed by: NURSE PRACTITIONER

## 2017-09-01 PROCEDURE — 97532 HB COGNITIVE SKILLS DEVELOPMENT: CPT

## 2017-09-01 RX ORDER — CEFDINIR 300 MG/1
300 CAPSULE ORAL EVERY 12 HOURS SCHEDULED
Status: DISCONTINUED | OUTPATIENT
Start: 2017-09-01 | End: 2017-09-01 | Stop reason: HOSPADM

## 2017-09-01 RX ORDER — TRAMADOL HYDROCHLORIDE 50 MG/1
50 TABLET ORAL EVERY 6 HOURS PRN
Qty: 15 TABLET | Refills: 0 | Status: SHIPPED | OUTPATIENT
Start: 2017-09-01 | End: 2017-09-01

## 2017-09-01 RX ORDER — AMLODIPINE BESYLATE 2.5 MG/1
2.5 TABLET ORAL DAILY
Qty: 30 TABLET | Refills: 0 | Status: ON HOLD | OUTPATIENT
Start: 2017-09-01 | End: 2017-10-04

## 2017-09-01 RX ORDER — ASPIRIN 81 MG/1
81 TABLET, CHEWABLE ORAL DAILY
Refills: 0 | Status: ON HOLD
Start: 2017-09-01 | End: 2017-10-04

## 2017-09-01 RX ORDER — AMLODIPINE BESYLATE 2.5 MG/1
2.5 TABLET ORAL DAILY
Qty: 30 TABLET | Refills: 0 | Status: SHIPPED | OUTPATIENT
Start: 2017-09-01 | End: 2017-09-01

## 2017-09-01 RX ORDER — MECLIZINE HCL 12.5 MG/1
12.5 TABLET ORAL EVERY 8 HOURS PRN
Qty: 30 TABLET | Refills: 0 | Status: SHIPPED | OUTPATIENT
Start: 2017-09-01 | End: 2017-09-01

## 2017-09-01 RX ORDER — CEFDINIR 300 MG/1
300 CAPSULE ORAL EVERY 12 HOURS SCHEDULED
Qty: 14 CAPSULE | Refills: 0 | Status: SHIPPED | OUTPATIENT
Start: 2017-09-01 | End: 2017-09-08

## 2017-09-01 RX ORDER — TRAMADOL HYDROCHLORIDE 50 MG/1
50 TABLET ORAL EVERY 6 HOURS PRN
Qty: 15 TABLET | Refills: 0 | Status: SHIPPED | OUTPATIENT
Start: 2017-09-01 | End: 2017-10-05 | Stop reason: HOSPADM

## 2017-09-01 RX ORDER — CEFDINIR 300 MG/1
300 CAPSULE ORAL EVERY 12 HOURS SCHEDULED
Qty: 14 CAPSULE | Refills: 0 | Status: SHIPPED | OUTPATIENT
Start: 2017-09-01 | End: 2017-09-01

## 2017-09-01 RX ORDER — PANTOPRAZOLE SODIUM 40 MG/1
40 TABLET, DELAYED RELEASE ORAL
Qty: 15 TABLET | Refills: 0 | Status: ON HOLD | OUTPATIENT
Start: 2017-09-01 | End: 2017-11-27 | Stop reason: SDDI

## 2017-09-01 RX ORDER — PANTOPRAZOLE SODIUM 40 MG/1
40 TABLET, DELAYED RELEASE ORAL
Qty: 15 TABLET | Refills: 0 | Status: SHIPPED | OUTPATIENT
Start: 2017-09-01 | End: 2017-09-01

## 2017-09-01 RX ORDER — MECLIZINE HCL 12.5 MG/1
12.5 TABLET ORAL EVERY 8 HOURS PRN
Qty: 30 TABLET | Refills: 0 | Status: SHIPPED | OUTPATIENT
Start: 2017-09-01 | End: 2017-11-22

## 2017-09-01 RX ADMIN — ENOXAPARIN SODIUM 40 MG: 40 INJECTION SUBCUTANEOUS at 08:50

## 2017-09-01 RX ADMIN — PSYLLIUM HUSK 1 PACKET: 3.4 POWDER ORAL at 08:50

## 2017-09-01 RX ADMIN — VITAMIN D, TAB 1000IU (100/BT) 1000 UNITS: 25 TAB at 08:50

## 2017-09-01 RX ADMIN — PANTOPRAZOLE SODIUM 40 MG: 40 TABLET, DELAYED RELEASE ORAL at 05:43

## 2017-09-01 RX ADMIN — DOCUSATE SODIUM 100 MG: 100 CAPSULE, LIQUID FILLED ORAL at 08:50

## 2017-09-01 RX ADMIN — TRAMADOL HYDROCHLORIDE 50 MG: 50 TABLET, FILM COATED ORAL at 02:06

## 2017-09-01 RX ADMIN — GABAPENTIN 100 MG: 100 CAPSULE ORAL at 08:49

## 2017-09-01 RX ADMIN — CEFTRIAXONE 1000 MG: 1 INJECTION, POWDER, FOR SOLUTION INTRAMUSCULAR; INTRAVENOUS at 08:49

## 2017-09-01 RX ADMIN — AMLODIPINE BESYLATE 2.5 MG: 2.5 TABLET ORAL at 08:49

## 2017-09-01 RX ADMIN — SENNOSIDES 8.6 MG: 8.6 TABLET, FILM COATED ORAL at 08:49

## 2017-09-01 RX ADMIN — LEVOTHYROXINE SODIUM 25 MCG: 25 TABLET ORAL at 05:43

## 2017-09-01 RX ADMIN — FOLIC ACID 1 MG: 1 TABLET ORAL at 08:49

## 2017-09-01 RX ADMIN — ASPIRIN 81 MG 81 MG: 81 TABLET ORAL at 08:49

## 2017-09-05 DIAGNOSIS — Z01.818 ENCOUNTER FOR OTHER PREPROCEDURAL EXAMINATION: ICD-10-CM

## 2017-09-06 ENCOUNTER — GENERIC CONVERSION - ENCOUNTER (OUTPATIENT)
Dept: OTHER | Facility: OTHER | Age: 72
End: 2017-09-06

## 2017-09-11 ENCOUNTER — APPOINTMENT (OUTPATIENT)
Dept: LAB | Facility: HOSPITAL | Age: 72
End: 2017-09-11
Attending: ORTHOPAEDIC SURGERY
Payer: MEDICARE

## 2017-09-11 DIAGNOSIS — Z01.818 ENCOUNTER FOR OTHER PREPROCEDURAL EXAMINATION: ICD-10-CM

## 2017-09-11 LAB
BILIRUB UR QL STRIP: NEGATIVE
CLARITY UR: CLEAR
COLOR UR: YELLOW
GLUCOSE UR STRIP-MCNC: NEGATIVE MG/DL
HGB UR QL STRIP.AUTO: NEGATIVE
KETONES UR STRIP-MCNC: NEGATIVE MG/DL
LEUKOCYTE ESTERASE UR QL STRIP: NEGATIVE
NITRITE UR QL STRIP: NEGATIVE
PH UR STRIP.AUTO: 7 [PH] (ref 4.5–8)
PROT UR STRIP-MCNC: NEGATIVE MG/DL
SP GR UR STRIP.AUTO: 1 (ref 1–1.03)
UROBILINOGEN UR QL STRIP.AUTO: 0.2 E.U./DL

## 2017-09-11 PROCEDURE — 81003 URINALYSIS AUTO W/O SCOPE: CPT

## 2017-09-12 ENCOUNTER — ALLSCRIPTS OFFICE VISIT (OUTPATIENT)
Dept: OTHER | Facility: OTHER | Age: 72
End: 2017-09-12

## 2017-09-29 ENCOUNTER — ANESTHESIA EVENT (OUTPATIENT)
Dept: PERIOP | Facility: HOSPITAL | Age: 72
DRG: 482 | End: 2017-09-29
Payer: MEDICARE

## 2017-10-04 ENCOUNTER — HOSPITAL ENCOUNTER (INPATIENT)
Facility: HOSPITAL | Age: 72
LOS: 1 days | Discharge: HOME | DRG: 482 | End: 2017-10-05
Attending: ORTHOPAEDIC SURGERY | Admitting: ORTHOPAEDIC SURGERY
Payer: MEDICARE

## 2017-10-04 ENCOUNTER — ANESTHESIA (OUTPATIENT)
Dept: PERIOP | Facility: HOSPITAL | Age: 72
DRG: 482 | End: 2017-10-04
Payer: MEDICARE

## 2017-10-04 ENCOUNTER — APPOINTMENT (OUTPATIENT)
Dept: RADIOLOGY | Facility: HOSPITAL | Age: 72
DRG: 482 | End: 2017-10-04
Payer: MEDICARE

## 2017-10-04 DIAGNOSIS — M16.12 PRIMARY OSTEOARTHRITIS OF LEFT HIP: ICD-10-CM

## 2017-10-04 LAB
ABO GROUP BLD: NORMAL
BLD GP AB SCN SERPL QL: NEGATIVE
RH BLD: POSITIVE
SPECIMEN EXPIRATION DATE: NORMAL

## 2017-10-04 PROCEDURE — 86900 BLOOD TYPING SEROLOGIC ABO: CPT | Performed by: ORTHOPAEDIC SURGERY

## 2017-10-04 PROCEDURE — 0SJB0ZZ INSPECTION OF LEFT HIP JOINT, OPEN APPROACH: ICD-10-PCS | Performed by: ORTHOPAEDIC SURGERY

## 2017-10-04 PROCEDURE — 86901 BLOOD TYPING SEROLOGIC RH(D): CPT | Performed by: ORTHOPAEDIC SURGERY

## 2017-10-04 PROCEDURE — 86923 COMPATIBILITY TEST ELECTRIC: CPT

## 2017-10-04 PROCEDURE — 73501 X-RAY EXAM HIP UNI 1 VIEW: CPT

## 2017-10-04 PROCEDURE — 86850 RBC ANTIBODY SCREEN: CPT | Performed by: ORTHOPAEDIC SURGERY

## 2017-10-04 RX ORDER — FENTANYL CITRATE/PF 50 MCG/ML
25 SYRINGE (ML) INJECTION
Status: DISCONTINUED | OUTPATIENT
Start: 2017-10-04 | End: 2017-10-04 | Stop reason: HOSPADM

## 2017-10-04 RX ORDER — ONDANSETRON 2 MG/ML
4 INJECTION INTRAMUSCULAR; INTRAVENOUS ONCE AS NEEDED
Status: DISCONTINUED | OUTPATIENT
Start: 2017-10-04 | End: 2017-10-04 | Stop reason: HOSPADM

## 2017-10-04 RX ORDER — GABAPENTIN 100 MG/1
100 CAPSULE ORAL 3 TIMES DAILY
Status: DISCONTINUED | OUTPATIENT
Start: 2017-10-04 | End: 2017-10-05 | Stop reason: HOSPADM

## 2017-10-04 RX ORDER — ACETAMINOPHEN 325 MG/1
975 TABLET ORAL ONCE
Status: COMPLETED | OUTPATIENT
Start: 2017-10-04 | End: 2017-10-04

## 2017-10-04 RX ORDER — ASPIRIN 325 MG
325 TABLET ORAL EVERY 12 HOURS SCHEDULED
Status: DISCONTINUED | OUTPATIENT
Start: 2017-10-04 | End: 2017-10-05 | Stop reason: HOSPADM

## 2017-10-04 RX ORDER — TRAMADOL HYDROCHLORIDE 50 MG/1
50 TABLET ORAL EVERY 6 HOURS SCHEDULED
Status: DISCONTINUED | OUTPATIENT
Start: 2017-10-04 | End: 2017-10-05 | Stop reason: HOSPADM

## 2017-10-04 RX ORDER — OXYCODONE HYDROCHLORIDE 5 MG/1
5 TABLET ORAL EVERY 4 HOURS PRN
Status: DISCONTINUED | OUTPATIENT
Start: 2017-10-04 | End: 2017-10-05 | Stop reason: HOSPADM

## 2017-10-04 RX ORDER — GLYCOPYRROLATE 0.2 MG/ML
INJECTION INTRAMUSCULAR; INTRAVENOUS AS NEEDED
Status: DISCONTINUED | OUTPATIENT
Start: 2017-10-04 | End: 2017-10-04 | Stop reason: SURG

## 2017-10-04 RX ORDER — DOCUSATE SODIUM 100 MG/1
100 CAPSULE, LIQUID FILLED ORAL 2 TIMES DAILY
Status: DISCONTINUED | OUTPATIENT
Start: 2017-10-04 | End: 2017-10-05 | Stop reason: HOSPADM

## 2017-10-04 RX ORDER — NORTRIPTYLINE HYDROCHLORIDE 25 MG/1
25 CAPSULE ORAL
Status: DISCONTINUED | OUTPATIENT
Start: 2017-10-04 | End: 2017-10-04

## 2017-10-04 RX ORDER — TRAMADOL HYDROCHLORIDE 50 MG/1
50 TABLET ORAL EVERY 6 HOURS PRN
Qty: 10 TABLET | Refills: 0 | Status: SHIPPED | OUTPATIENT
Start: 2017-10-04 | End: 2017-10-05 | Stop reason: HOSPADM

## 2017-10-04 RX ORDER — DEXAMETHASONE SODIUM PHOSPHATE 4 MG/ML
INJECTION, SOLUTION INTRA-ARTICULAR; INTRALESIONAL; INTRAMUSCULAR; INTRAVENOUS; SOFT TISSUE AS NEEDED
Status: DISCONTINUED | OUTPATIENT
Start: 2017-10-04 | End: 2017-10-04 | Stop reason: SURG

## 2017-10-04 RX ORDER — ALBUMIN, HUMAN INJ 5% 5 %
SOLUTION INTRAVENOUS CONTINUOUS PRN
Status: DISCONTINUED | OUTPATIENT
Start: 2017-10-04 | End: 2017-10-04 | Stop reason: SURG

## 2017-10-04 RX ORDER — DIAZEPAM 5 MG/1
5 TABLET ORAL
Status: DISCONTINUED | OUTPATIENT
Start: 2017-10-04 | End: 2017-10-05 | Stop reason: HOSPADM

## 2017-10-04 RX ORDER — SENNOSIDES 8.6 MG
1 TABLET ORAL DAILY
Status: DISCONTINUED | OUTPATIENT
Start: 2017-10-05 | End: 2017-10-05 | Stop reason: HOSPADM

## 2017-10-04 RX ORDER — ROCURONIUM BROMIDE 10 MG/ML
INJECTION, SOLUTION INTRAVENOUS AS NEEDED
Status: DISCONTINUED | OUTPATIENT
Start: 2017-10-04 | End: 2017-10-04 | Stop reason: SURG

## 2017-10-04 RX ORDER — CEFAZOLIN SODIUM 1 G/3ML
INJECTION, POWDER, FOR SOLUTION INTRAMUSCULAR; INTRAVENOUS AS NEEDED
Status: DISCONTINUED | OUTPATIENT
Start: 2017-10-04 | End: 2017-10-04 | Stop reason: SURG

## 2017-10-04 RX ORDER — ONDANSETRON 2 MG/ML
4 INJECTION INTRAMUSCULAR; INTRAVENOUS EVERY 6 HOURS PRN
Status: DISCONTINUED | OUTPATIENT
Start: 2017-10-04 | End: 2017-10-05 | Stop reason: HOSPADM

## 2017-10-04 RX ORDER — ACETAMINOPHEN 325 MG/1
650 TABLET ORAL EVERY 6 HOURS PRN
Status: DISCONTINUED | OUTPATIENT
Start: 2017-10-04 | End: 2017-10-05 | Stop reason: HOSPADM

## 2017-10-04 RX ORDER — SODIUM CHLORIDE, SODIUM LACTATE, POTASSIUM CHLORIDE, CALCIUM CHLORIDE 600; 310; 30; 20 MG/100ML; MG/100ML; MG/100ML; MG/100ML
50 INJECTION, SOLUTION INTRAVENOUS CONTINUOUS
Status: DISCONTINUED | OUTPATIENT
Start: 2017-10-04 | End: 2017-10-05 | Stop reason: HOSPADM

## 2017-10-04 RX ORDER — SODIUM CHLORIDE 9 MG/ML
125 INJECTION, SOLUTION INTRAVENOUS CONTINUOUS
Status: DISCONTINUED | OUTPATIENT
Start: 2017-10-04 | End: 2017-10-05 | Stop reason: HOSPADM

## 2017-10-04 RX ORDER — PANTOPRAZOLE SODIUM 40 MG/1
40 TABLET, DELAYED RELEASE ORAL
Status: DISCONTINUED | OUTPATIENT
Start: 2017-10-05 | End: 2017-10-05 | Stop reason: HOSPADM

## 2017-10-04 RX ORDER — PROPOFOL 10 MG/ML
INJECTION, EMULSION INTRAVENOUS AS NEEDED
Status: DISCONTINUED | OUTPATIENT
Start: 2017-10-04 | End: 2017-10-04 | Stop reason: SURG

## 2017-10-04 RX ORDER — TRAMADOL HYDROCHLORIDE 50 MG/1
50 TABLET ORAL EVERY 6 HOURS PRN
Status: DISCONTINUED | OUTPATIENT
Start: 2017-10-04 | End: 2017-10-04

## 2017-10-04 RX ORDER — TRANEXAMIC ACID 100 MG/ML
INJECTION, SOLUTION INTRAVENOUS AS NEEDED
Status: DISCONTINUED | OUTPATIENT
Start: 2017-10-04 | End: 2017-10-04 | Stop reason: SURG

## 2017-10-04 RX ORDER — OXYCODONE HYDROCHLORIDE 10 MG/1
10 TABLET ORAL EVERY 4 HOURS PRN
Status: DISCONTINUED | OUTPATIENT
Start: 2017-10-04 | End: 2017-10-05 | Stop reason: HOSPADM

## 2017-10-04 RX ORDER — NORTRIPTYLINE HYDROCHLORIDE 10 MG/1
10 CAPSULE ORAL
Status: DISCONTINUED | OUTPATIENT
Start: 2017-10-04 | End: 2017-10-04

## 2017-10-04 RX ORDER — ASCORBIC ACID 500 MG
1000 TABLET ORAL DAILY
Status: DISCONTINUED | OUTPATIENT
Start: 2017-10-05 | End: 2017-10-05 | Stop reason: HOSPADM

## 2017-10-04 RX ORDER — METOCLOPRAMIDE HYDROCHLORIDE 5 MG/ML
10 INJECTION INTRAMUSCULAR; INTRAVENOUS ONCE AS NEEDED
Status: DISCONTINUED | OUTPATIENT
Start: 2017-10-04 | End: 2017-10-04 | Stop reason: HOSPADM

## 2017-10-04 RX ORDER — LEVOTHYROXINE SODIUM 0.03 MG/1
25 TABLET ORAL
Status: DISCONTINUED | OUTPATIENT
Start: 2017-10-05 | End: 2017-10-05 | Stop reason: HOSPADM

## 2017-10-04 RX ORDER — METOCLOPRAMIDE HYDROCHLORIDE 5 MG/ML
INJECTION INTRAMUSCULAR; INTRAVENOUS AS NEEDED
Status: DISCONTINUED | OUTPATIENT
Start: 2017-10-04 | End: 2017-10-04 | Stop reason: SURG

## 2017-10-04 RX ORDER — FOLIC ACID 1 MG/1
1 TABLET ORAL DAILY
Status: DISCONTINUED | OUTPATIENT
Start: 2017-10-05 | End: 2017-10-05 | Stop reason: HOSPADM

## 2017-10-04 RX ORDER — MELATONIN
1000 DAILY
Status: DISCONTINUED | OUTPATIENT
Start: 2017-10-05 | End: 2017-10-05 | Stop reason: HOSPADM

## 2017-10-04 RX ORDER — FENTANYL CITRATE 50 UG/ML
INJECTION, SOLUTION INTRAMUSCULAR; INTRAVENOUS AS NEEDED
Status: DISCONTINUED | OUTPATIENT
Start: 2017-10-04 | End: 2017-10-04 | Stop reason: SURG

## 2017-10-04 RX ORDER — MORPHINE SULFATE 2 MG/ML
2 INJECTION, SOLUTION INTRAMUSCULAR; INTRAVENOUS EVERY 2 HOUR PRN
Status: DISCONTINUED | OUTPATIENT
Start: 2017-10-04 | End: 2017-10-05 | Stop reason: HOSPADM

## 2017-10-04 RX ORDER — HYDROMORPHONE HYDROCHLORIDE 2 MG/ML
INJECTION, SOLUTION INTRAMUSCULAR; INTRAVENOUS; SUBCUTANEOUS AS NEEDED
Status: DISCONTINUED | OUTPATIENT
Start: 2017-10-04 | End: 2017-10-04 | Stop reason: SURG

## 2017-10-04 RX ORDER — GABAPENTIN 300 MG/1
300 CAPSULE ORAL ONCE
Status: COMPLETED | OUTPATIENT
Start: 2017-10-04 | End: 2017-10-04

## 2017-10-04 RX ORDER — MECLIZINE HCL 12.5 MG/1
12.5 TABLET ORAL EVERY 8 HOURS PRN
Status: DISCONTINUED | OUTPATIENT
Start: 2017-10-04 | End: 2017-10-05 | Stop reason: HOSPADM

## 2017-10-04 RX ORDER — ONDANSETRON 2 MG/ML
INJECTION INTRAMUSCULAR; INTRAVENOUS AS NEEDED
Status: DISCONTINUED | OUTPATIENT
Start: 2017-10-04 | End: 2017-10-04 | Stop reason: SURG

## 2017-10-04 RX ADMIN — METOCLOPRAMIDE 10 MG: 5 INJECTION, SOLUTION INTRAMUSCULAR; INTRAVENOUS at 14:42

## 2017-10-04 RX ADMIN — TRAMADOL HYDROCHLORIDE 50 MG: 50 TABLET, FILM COATED ORAL at 23:09

## 2017-10-04 RX ADMIN — ACETAMINOPHEN 975 MG: 325 TABLET, FILM COATED ORAL at 11:07

## 2017-10-04 RX ADMIN — CEFAZOLIN SODIUM 1000 MG: 1 SOLUTION INTRAVENOUS at 14:42

## 2017-10-04 RX ADMIN — PHENYLEPHRINE HYDROCHLORIDE 40 MCG/MIN: 10 INJECTION INTRAVENOUS at 15:42

## 2017-10-04 RX ADMIN — HYDROMORPHONE HYDROCHLORIDE 0.2 MG: 1 INJECTION, SOLUTION INTRAMUSCULAR; INTRAVENOUS; SUBCUTANEOUS at 17:37

## 2017-10-04 RX ADMIN — FENTANYL CITRATE 100 MCG: 50 INJECTION, SOLUTION INTRAMUSCULAR; INTRAVENOUS at 15:32

## 2017-10-04 RX ADMIN — NORTRIPTYLINE HYDROCHLORIDE 35 MG: 25 CAPSULE ORAL at 21:13

## 2017-10-04 RX ADMIN — GLYCOPYRROLATE 0.4 MG: 0.2 INJECTION, SOLUTION INTRAMUSCULAR; INTRAVENOUS at 17:01

## 2017-10-04 RX ADMIN — DOCUSATE SODIUM 100 MG: 100 CAPSULE, LIQUID FILLED ORAL at 19:39

## 2017-10-04 RX ADMIN — HYDROMORPHONE HYDROCHLORIDE 0.2 MG: 1 INJECTION, SOLUTION INTRAMUSCULAR; INTRAVENOUS; SUBCUTANEOUS at 17:56

## 2017-10-04 RX ADMIN — CEFAZOLIN 1000 MG: 1 INJECTION, POWDER, FOR SOLUTION INTRAVENOUS at 13:35

## 2017-10-04 RX ADMIN — ROCURONIUM BROMIDE 40 MG: 10 INJECTION, SOLUTION INTRAVENOUS at 15:07

## 2017-10-04 RX ADMIN — FENTANYL CITRATE 25 MCG: 50 INJECTION INTRAMUSCULAR; INTRAVENOUS at 17:25

## 2017-10-04 RX ADMIN — FENTANYL CITRATE 25 MCG: 50 INJECTION INTRAMUSCULAR; INTRAVENOUS at 17:35

## 2017-10-04 RX ADMIN — SODIUM CHLORIDE 125 ML/HR: 0.9 INJECTION, SOLUTION INTRAVENOUS at 18:15

## 2017-10-04 RX ADMIN — HYDROMORPHONE HYDROCHLORIDE 0.2 MG: 1 INJECTION, SOLUTION INTRAMUSCULAR; INTRAVENOUS; SUBCUTANEOUS at 17:42

## 2017-10-04 RX ADMIN — ALBUMIN HUMAN: 0.05 INJECTION, SOLUTION INTRAVENOUS at 15:54

## 2017-10-04 RX ADMIN — CEFAZOLIN SODIUM 1000 MG: 1 SOLUTION INTRAVENOUS at 22:09

## 2017-10-04 RX ADMIN — TRAMADOL HYDROCHLORIDE 50 MG: 50 TABLET, FILM COATED ORAL at 19:39

## 2017-10-04 RX ADMIN — HYDROMORPHONE HYDROCHLORIDE 0.2 MG: 1 INJECTION, SOLUTION INTRAMUSCULAR; INTRAVENOUS; SUBCUTANEOUS at 18:07

## 2017-10-04 RX ADMIN — GABAPENTIN 300 MG: 300 CAPSULE ORAL at 11:07

## 2017-10-04 RX ADMIN — PROPOFOL 120 MG: 10 INJECTION, EMULSION INTRAVENOUS at 15:07

## 2017-10-04 RX ADMIN — GABAPENTIN 100 MG: 100 CAPSULE ORAL at 21:12

## 2017-10-04 RX ADMIN — HYDROMORPHONE HYDROCHLORIDE 0.5 MG: 2 INJECTION, SOLUTION INTRAMUSCULAR; INTRAVENOUS; SUBCUTANEOUS at 15:32

## 2017-10-04 RX ADMIN — SODIUM CHLORIDE, SODIUM LACTATE, POTASSIUM CHLORIDE, AND CALCIUM CHLORIDE 50 ML/HR: .6; .31; .03; .02 INJECTION, SOLUTION INTRAVENOUS at 11:52

## 2017-10-04 RX ADMIN — DEXAMETHASONE SODIUM PHOSPHATE 4 MG: 4 INJECTION, SOLUTION INTRAMUSCULAR; INTRAVENOUS at 14:42

## 2017-10-04 RX ADMIN — HYDROMORPHONE HYDROCHLORIDE 0.2 MG: 1 INJECTION, SOLUTION INTRAMUSCULAR; INTRAVENOUS; SUBCUTANEOUS at 17:51

## 2017-10-04 RX ADMIN — NEOSTIGMINE METHYLSULFATE 4 MG: 1 INJECTION, SOLUTION INTRAMUSCULAR; INTRAVENOUS; SUBCUTANEOUS at 17:01

## 2017-10-04 RX ADMIN — OXYCODONE HYDROCHLORIDE 10 MG: 10 TABLET ORAL at 19:39

## 2017-10-04 RX ADMIN — ONDANSETRON 4 MG: 2 INJECTION INTRAMUSCULAR; INTRAVENOUS at 14:42

## 2017-10-04 RX ADMIN — TRANEXAMIC ACID 1 G: 100 INJECTION, SOLUTION INTRAVENOUS at 14:42

## 2017-10-04 RX ADMIN — DIAZEPAM 5 MG: 5 TABLET ORAL at 21:13

## 2017-10-04 RX ADMIN — MORPHINE SULFATE 2 MG: 2 INJECTION, SOLUTION INTRAMUSCULAR; INTRAVENOUS at 21:13

## 2017-10-04 RX ADMIN — SODIUM CHLORIDE, SODIUM LACTATE, POTASSIUM CHLORIDE, AND CALCIUM CHLORIDE: .6; .31; .03; .02 INJECTION, SOLUTION INTRAVENOUS at 14:28

## 2017-10-04 NOTE — PHYSICAL THERAPY NOTE
PT CANCEL    PT CONSULT RECEIVED  CHART REVIEW PERFORMED  PER RN, CONFIRMED IN OP NOTE WRITTEN BY DOCTOR LISBETH, NO REMOVAL OF HARDWARE PERFORMED WITH NO JOINT REPLACED  WILL HOLD EVAL POD#0 AND AWAIT CLARIFICATION FROM ORTHOPEDICS         Paolo Kemp PT

## 2017-10-04 NOTE — OP NOTE
OPERATIVE REPORT  PATIENT NAME: Barrera Rivera    :  1945  MRN: [id number]  Pt Location: [facility] OR ROOM 18    SURGERY DATE: 10/4/2017    Surgeon(s) and Role:     * Tigist Robledo MD - Primary     * Veronica Costa PA-C - Assisting     * Natali Orellana MD - Assisting    Preop Diagnosis:  Painful arthritis left hip  Primary osteoarthritis of left hip [M16 12]    Post-Op Diagnosis Codes:     * Primary osteoarthritis of left hip [M16 12]    Procedure(s) (LRB):  Hareware removal of left hip (Left)    Specimen(s):  * No specimens in log *    Estimated Blood Loss:   Minimal    Drains:       Anesthesia Type:   General    Operative Indications:  Primary osteoarthritis of left hip [M16 12]  Status post open reduction internal fixation of left hip fracture    Operative Findings: The same    Complications:   None    Procedure and Technique:  Attempt at removal of hardware left hip  Patient had severe degenerative arthritis of her left hip that failed conservative treatment  She was being treated with serial injections of cortisone that helped and beginning but recently were of little or no help  She decided she wanted to go ahead with a hip replacement  Her previous surgery was done at [facility] by another physician  The operative report from the other physician was obtained and the equipment stated in the operative report was [employer] hip screw  The [employer] representative was asked to provide the appropriate equipment to remove the on cephalomedullary nail  He gives agreement that we believed was compatible with the information obtained from the original operative report from back in   Patient was administered a general anesthetic which again the patient and then transferred to the [facility] table and she was prepped and draped in usual sterile fashion for an operation on the left hip the planned procedure was removal of cephalomedullary nail and then an anterior total hip arthroplasty   The incision from inserting the cephalomedullary nail was opened and sharp dissection was carried down through subcutaneous tissue to the fascia the top of the nail was overgrown with bone and osteotome was used to remove the bony cap on next attention was drawn to the lateral aspect of the femur where the lag screw was placed sharp dissection was carried down through the skin subcutaneous tissue and the fascia and the and the screw was visualized an osteotome was used to remove the bony cap over the lag screw base  The handle that was supplied by the [employer] representative did not fit the end of the lag screw  It was apparent that it was a different model  We used all the resources that we could from our operating room and attempted to see if the [employer] equipment was compatible with the [employer] for extraction purposes  After opening although sets and attempting it was found that nothing that we had on hand was compatible with the extraction of this hardware  It was felt that the best thing to do was just close the wounds and Re investigate the identity of the implant was in place  A call directly to the [employer] did not reveal any useful information at the time  It was also felt that further and investigation to try to identify the extraction handle as necessary was in order prior to any further attempts to remove this hardware  There is no other way to carry out a hip replacement other than to remove the current indwelling hardware  Both wounds were thoroughly irrigated with sterile saline solution of the fascia was closed using 0 Vicryl in a figure-of-eight fashion subcutaneous tissue was closed using 2 0 Vicryl inverted fashion and skin was closed using staples sterile dressings were applied the patient tolerated this procedure well left the operating in good condition       I was present for the entire procedure    Patient Disposition:  PACU     SIGNATURE: Shukri Canseco MD  DATE: October 4, 2017  TIME: 5:30 PM

## 2017-10-04 NOTE — ANESTHESIA POSTPROCEDURE EVALUATION
Post-Op Assessment Note      CV Status:  Stable    Mental Status:  Alert and awake    Hydration Status:  Euvolemic    PONV Controlled:  Controlled    Airway Patency:  Patent  Airway: intubated    Post Op Vitals Reviewed: Yes          Staff: Anesthesiologist           BP   142/100   Temp 98 1 °F (36 7 °C) (10/04/17 1719)    Pulse  81   Resp      SpO2   98

## 2017-10-04 NOTE — PROGRESS NOTES
Spoke with ortho about informing patient of the surgery   Will tell patient to talk with family about procedure since Justin was not in the OR with patient

## 2017-10-04 NOTE — ANESTHESIA PREPROCEDURE EVALUATION
Review of Systems/Medical History  Patient summary reviewed  Chart reviewed  No history of anesthetic complications     Cardiovascular  EKG reviewed,   Comment: Normal sinus rhythm  Right bundle branch block  Abnormal ECG  When compared with ECG of 12-OCT-2014 08:32,  Non-specific change in ST segment in Anterior leads  Confirmed by Dav Finn MD, Crystal Menjivar ([id number]) on 8/30/2017           LEFT VENTRICLE: Size was normal  Systolic function was normal  Ejection fraction was estimated to be 60 %  Although no diagnostic regional wall motion abnormality was identified, this possibility cannot be completely excluded on the basis  of this study  Wall thickness was normal  DOPPLER: Left ventricular diastolic function parameters were normal      RIGHT VENTRICLE: The size was normal  Systolic function was normal  Wall thickness was normal      LEFT ATRIUM: Size was normal      RIGHT ATRIUM: Size was normal      MITRAL VALVE: Valve structure was normal  There was normal leaflet separation  DOPPLER: The transmitral velocity was within the normal range  There was no evidence for stenosis  There was trace regurgitation      AORTIC VALVE: The valve was trileaflet  Leaflets exhibited mildly increased thickness, mild calcification, and normal cuspal separation  DOPPLER: Transaortic velocity was within the normal range  There was no evidence for stenosis  There  was no significant regurgitation      TRICUSPID VALVE: The valve structure was normal  There was normal leaflet separation  DOPPLER: The transtricuspid velocity was within the normal range  There was no evidence for stenosis  There was mild regurgitation  Pulmonary artery  systolic pressure was within the normal range  Estimated peak PA pressure was 35 mmHg      PULMONIC VALVE: Leaflets exhibited normal thickness, no calcification, and normal cuspal separation  DOPPLER: The transpulmonic velocity was within the normal range   There was no significant regurgitation      PERICARDIUM: There was no pericardial effusion  The pericardium was normal in appearance      AORTA: The root exhibited normal size      SYSTEMIC VEINS: IVC: The inferior vena cava was normal in size  Respirophasic changes were normal , Pulmonary hypertension Pulmonary       GI/Hepatic    GERD ,             Endo/Other  History of thyroid disease , Arthritis     GYN       Hematology   Musculoskeletal       Neurology   Psychology   Anxiety,            Physical Exam    Airway    Mallampati score: II  TM Distance: >3 FB  Neck ROM: full     Dental   No notable dental hx     Cardiovascular  Cardiovascular exam normal    Pulmonary  Pulmonary exam normal Breath sounds clear to auscultation,     Other Findings        Anesthesia Plan  ASA Score- 3       Anesthesia Type- general  Comment: Discussed general anesthesia and possible arterial line      Induction- intravenous      Informed Consent  Anesthetic plan and risks discussed with patient  I, Dr Latoya John, the attending physician, have personally seen and evaluated the patient prior to anesthetic care  I have reviewed the pre-anesthetic record, and other medical records if appropriate to the anesthetic care  If a CRNA is involved in the case, I have reviewed the CRNA assessment, if present, and agree  The patient is in a suitable condition to proceed with my formulated anesthetic plan      Lab Results   Component Value Date    WBC 5 17 09/01/2017    HGB 11 9 09/01/2017    HCT 37 5 09/01/2017    MCV 87 09/01/2017     09/01/2017     Lab Results   Component Value Date    GLUCOSE 85 09/01/2017    CALCIUM 9 0 09/01/2017     09/01/2017    K 4 2 09/01/2017    CO2 29 09/01/2017     09/01/2017    BUN 8 09/01/2017    CREATININE 0 58 (L) 09/01/2017     Lab Results   Component Value Date    INR 0 94 08/22/2017    INR 0 96 10/13/2014    INR 0 95 10/12/2014    PROTIME 12 6 08/22/2017    PROTIME 12 6 10/13/2014    PROTIME 12 5 10/12/2014     Lab Results   Component Value Date PTT 27 08/22/2017     Type and Screen:  O

## 2017-10-05 VITALS
OXYGEN SATURATION: 100 % | RESPIRATION RATE: 16 BRPM | SYSTOLIC BLOOD PRESSURE: 119 MMHG | HEIGHT: 60 IN | BODY MASS INDEX: 20.62 KG/M2 | DIASTOLIC BLOOD PRESSURE: 65 MMHG | TEMPERATURE: 98 F | WEIGHT: 105 LBS | HEART RATE: 74 BPM

## 2017-10-05 PROCEDURE — G8978 MOBILITY CURRENT STATUS: HCPCS

## 2017-10-05 PROCEDURE — G8979 MOBILITY GOAL STATUS: HCPCS

## 2017-10-05 PROCEDURE — 97530 THERAPEUTIC ACTIVITIES: CPT

## 2017-10-05 PROCEDURE — 97162 PT EVAL MOD COMPLEX 30 MIN: CPT

## 2017-10-05 RX ORDER — OXYCODONE HYDROCHLORIDE 5 MG/1
5 TABLET ORAL EVERY 4 HOURS PRN
Qty: 30 TABLET | Refills: 0
Start: 2017-10-05 | End: 2017-10-15

## 2017-10-05 RX ADMIN — DOCUSATE SODIUM 100 MG: 100 CAPSULE, LIQUID FILLED ORAL at 09:50

## 2017-10-05 RX ADMIN — GABAPENTIN 100 MG: 100 CAPSULE ORAL at 09:50

## 2017-10-05 RX ADMIN — PANTOPRAZOLE SODIUM 40 MG: 40 TABLET, DELAYED RELEASE ORAL at 06:15

## 2017-10-05 RX ADMIN — MENTHOL 5.4 MG: 5.4 LOZENGE ORAL at 08:48

## 2017-10-05 RX ADMIN — LEVOTHYROXINE SODIUM 25 MCG: 25 TABLET ORAL at 06:15

## 2017-10-05 RX ADMIN — TRAMADOL HYDROCHLORIDE 50 MG: 50 TABLET, FILM COATED ORAL at 06:15

## 2017-10-05 RX ADMIN — VITAMIN D, TAB 1000IU (100/BT) 1000 UNITS: 25 TAB at 09:50

## 2017-10-05 RX ADMIN — SODIUM CHLORIDE 125 ML/HR: 0.9 INJECTION, SOLUTION INTRAVENOUS at 03:14

## 2017-10-05 RX ADMIN — ACETAMINOPHEN 650 MG: 325 TABLET, FILM COATED ORAL at 11:28

## 2017-10-05 RX ADMIN — TRAMADOL HYDROCHLORIDE 50 MG: 50 TABLET, FILM COATED ORAL at 11:28

## 2017-10-05 RX ADMIN — CEFAZOLIN SODIUM 1000 MG: 1 SOLUTION INTRAVENOUS at 06:15

## 2017-10-05 NOTE — PROGRESS NOTES
Orthopedics   Geetha Colon 67 y o  female MRN: [id number]  Unit/Bed#: CW3 344-01      Subjective:  67 y  o female post operative day 1 sp LLE attempted removal of hardware   Pain controlled, denies motor/sensory deficit    Labs:    0  Lab Value Date/Time   HCT 37 5 09/01/2017 0701   HCT 39 6 08/29/2017 2145   HCT 39 1 08/22/2017 1445   HCT 36 8 12/16/2014 0727   HCT 28 8 (L) 10/30/2014 0626   HCT 29 2 (L) 10/27/2014 0648   HGB 11 9 09/01/2017 0701   HGB 12 9 08/29/2017 2145   HGB 12 6 08/22/2017 1445   HGB 11 9 12/16/2014 0727   HGB 8 8 (L) 10/30/2014 0626   HGB 9 0 (L) 10/27/2014 0648   INR 0 94 08/22/2017 1445   INR 0 96 10/13/2014 0608   WBC 5 17 09/01/2017 0701   WBC 9 82 08/29/2017 2145   WBC 7 80 08/22/2017 1445   WBC 5 60 12/16/2014 0727   WBC 5 58 10/30/2014 0626   WBC 6 95 10/27/2014 0648       Meds:    Current Facility-Administered Medications:     acetaminophen (TYLENOL) tablet 650 mg, 650 mg, Oral, Q6H PRN, Roscoe Espinosa MD    ascorbic acid (VITAMIN C) tablet 1,000 mg, 1,000 mg, Oral, Daily, Roscoe Espinosa MD    aspirin tablet 325 mg, 325 mg, Oral, Q12H [address], Roscoe Espinosa MD    ceFAZolin (ANCEF) IVPB (premix) 1,000 mg, 1,000 mg, Intravenous, Q8H, Roscoe Espinosa MD, Last Rate: 100 mL/hr at 10/05/17 0615, 1,000 mg at 10/05/17 0615    cholecalciferol (VITAMIN D3) tablet 1,000 Units, 1,000 Units, Oral, Daily, Roscoe Espinosa MD    diazepam (VALIUM) tablet 5 mg, 5 mg, Oral, Once HS, Roscoe Espinosa MD, 5 mg at 10/04/17 2113    docusate sodium (COLACE) capsule 100 mg, 100 mg, Oral, BID, Roscoe Espinosa MD, 100 mg at 79/34/82 4057    folic acid (FOLVITE) tablet 1 mg, 1 mg, Oral, Daily, Roscoe Espinosa MD    gabapentin (NEURONTIN) capsule 100 mg, 100 mg, Oral, TID, Roscoe Espinosa MD, 100 mg at 10/04/17 2112    lactated ringers infusion, 50 mL/hr, Intravenous, Continuous, Rachel Turpin MD, Stopped at 10/04/17 1701    levothyroxine tablet 25 mcg, 25 mcg, Oral, Early Morning, Roscoe Espinosa MD, 25 mcg at 10/05/17 0615   meclizine (ANTIVERT) tablet 12 5 mg, 12 5 mg, Oral, Q8H PRN, Migue Vaca MD    Menthol lozenge 5 4 mg, 1 lozenge, Mouth/Throat, Q2H PRN, Migue Vaca MD    morphine injection 2 mg, 2 mg, Intravenous, Q2H PRN, Migue Vaca MD, 2 mg at 10/04/17 2113    nortriptyline (PAMELOR) capsule 35 mg, 35 mg, Oral, HS, Migue Vaca MD, 35 mg at 10/04/17 2113    ondansetron (ZOFRAN) injection 4 mg, 4 mg, Intravenous, Q6H PRN, Migue Vaca MD    oxyCODONE (ROXICODONE) immediate release tablet 10 mg, 10 mg, Oral, Q4H PRN, Migue Vaca MD, 10 mg at 10/04/17 1939    oxyCODONE (ROXICODONE) IR tablet 5 mg, 5 mg, Oral, Q4H PRN, Migue Vaca MD    pantoprazole (PROTONIX) EC tablet 40 mg, 40 mg, Oral, Early Morning, Migue Vaca MD, 40 mg at 10/05/17 0615    senna (SENOKOT) tablet 8 6 mg, 1 tablet, Oral, Daily, Migue Vaca MD    sodium chloride 0 9 % infusion, 125 mL/hr, Intravenous, Continuous, Migue Vaca MD, Last Rate: 125 mL/hr at 10/05/17 0314, 125 mL/hr at 10/05/17 0314    traMADol (ULTRAM) tablet 50 mg, 50 mg, Oral, Q6H [address], Migue Vaca MD, 50 mg at 10/05/17 0403    Blood Culture:   No results found for: BLOODCX    Wound Culture:   No results found for: WOUNDCULT    Ins and Outs:  I/O last 24 hours: In: 1010 [I V :1575; IV Piggyback:250]  Out: 7586 [Urine:925; Blood:200]          Physical Exam:  Vitals:    10/05/17 0257   BP: 110/63   Pulse: 69   Resp: 16   Temp: 97 5 °F (36 4 °C)   SpO2: 100%     left Lower Extremity   · Dressings C/D/I at hip  · + ankle df/pf, ehl/fhl motor functions  · SEnsation intact sp/dp distribution    _*_*_*_*_*_*_*_*_*_*_*_*_*_*_*_*_*_*_*_*_*_*_*_*_*_*_*_*_*_*_*_*_*_*_*_*_*_*_*_*_*    Assessment: 67 y  o female post operative day 1 s/p attempted LLE removal of hardware    Plan:  · WBAt LLE  · DVT prophylaxis - mechanical  · Analgesics  · PT/OT  · Dispo:  Will discuss next surgical step with Dr Octavia Khalil   10/05/17

## 2017-10-05 NOTE — PHYSICAL THERAPY NOTE
PT TREATMENT        10/05/17 [facility]   Pain Assessment   Pain Assessment 0-10   Pain Score 9   Pain Type Acute pain;Surgical pain   Pain Location Hip   Pain Orientation Left   Effect of Pain on Daily Activities IMPAIRED OVERALL QUALITY OF MOBILILTY  AND TOLERANCE TO MOBILITY    Patient's Stated Pain Goal No pain   Hospital Pain Intervention(s) Cold applied; Ambulation/increased activity   Restrictions/Precautions   Weight Bearing Precautions Per Order Yes   LLE Weight Bearing Per Order WBAT   Other Precautions WBS; Fall Risk;Pain   General   Chart Reviewed Yes   Response to Previous Treatment Patient reporting fatigue but able to participate  Family/Caregiver Present No   Cognition   Overall Cognitive Status WFL   Orientation Level Oriented X4   Memory Within functional limits   Following Commands Follows all commands and directions without difficulty   Subjective   Subjective Pt reported pain but agreeable to continue particiaption    Bed Mobility   Sit to Supine 4  Minimal assistance   Additional items LE management   Transfers   Sit to Stand 5  Supervision   Additional items Verbal cues  (CS)   Stand to Sit 5  Supervision   Additional items Increased time required   Ambulation/Elevation   Gait pattern Decreased L stance;Decreased foot clearance; Short stride; Step to; Antalgic   Gait Assistance 4  Minimal assist   Additional items (CGA)   Assistive Device Rolling walker   Distance 20'   Balance   Static Sitting Good   Dynamic Sitting Fair +   Static Standing Fair   Dynamic Standing Fair -   Ambulatory Fair -   Endurance Deficit   Endurance Deficit Yes   Endurance Deficit Description pain    Activity Tolerance   Activity Tolerance Patient limited by pain   Medical Staff Made Aware Will update CM with paddy mosquera dispo   Nurse Made Aware appropriate to see per RN    Exercises   Hip Flexion Sitting;10 reps;AAROM; Bilateral   Knee AROM Long Arc Quad Sitting;20 reps;AROM; Bilateral   Ankle Pumps Sitting;20 reps;AROM; Bilateral Assessment   Prognosis Good   Problem List Decreased strength;Decreased range of motion;Pain;Orthopedic restrictions;Decreased skin integrity   Assessment Pt engaged in post eval PT treatment session focussing on ambulation and therex to icnrease overall strength and mobililty  Pt conitnues to ambualte with an antalgic gait pattern but demosntrates no LOB or adverse reactions  Pt limited by pain  LE therex performed to icnrease LE ROM and strength  Pt reporting h/o polio as a child but denies any braces  Anticipate safe d/c home with use of RW and family support     Goals   Patient Goals to get better    STG Expiration Date 10/12/17   Treatment Day 1   Plan   PT Frequency 5x/wk   Recommendation   Recommendation Home with family support   Equipment Recommended Walker   PT - OK to Discharge Yes  (when medically appropriate )   Additional Comments Pt returned to supine for comfort with LE foot pumps activated and all needs within reach      Yuval Officer, PT

## 2017-10-05 NOTE — PLAN OF CARE
Problem: PHYSICAL THERAPY ADULT  Goal: Performs mobility at highest level of function for planned discharge setting  See evaluation for individualized goals  Treatment/Interventions: Functional transfer training, LE strengthening/ROM, Elevations, Therapeutic exercise, Endurance training, Patient/family training, Equipment eval/education, Bed mobility, Gait training  Equipment Recommended: Chelo Blancas       See flowsheet documentation for full assessment, interventions and recommendations  Outcome: Progressing  Prognosis: Good  Problem List: Decreased strength, Decreased range of motion, Pain, Orthopedic restrictions, Decreased skin integrity  Assessment: Pt engaged in post eval PT treatment session focussing on ambulation and therex to icnrease overall strength and mobililty  Pt conitnues to ambualte with an antalgic gait pattern but demosntrates no LOB or adverse reactions  Pt limited by pain  LE therex performed to icnrease LE ROM and strength  Pt reporting h/o polio as a child but denies any braces  Anticipate safe d/c home with use of RW and family support  Recommendation: Home with family support     PT - OK to Discharge: Yes (when medically appropriate )    See flowsheet documentation for full assessment

## 2017-10-05 NOTE — DISCHARGE SUMMARY
ORTHOPEDICS DISCHARGE SUMMARY   Geetha Rivera 67 y o  female MRN: [id number]  Unit/Bed#: CW3 344-01      Attending Physician: Rashi Cantrell    Admitting diagnosis: Primary osteoarthritis of left hip [M16 12]    Discharge diagnosis: Primary osteoarthritis of left hip [M16 12]    Date of admission: 10/4/2017    Date of discharge: 10/05/17    Procedure: Lanis Slain removal of left hip (Left)    HPI  This is a 67y o  year old female that presented to the office with signs and symptoms of left hip osteoarthritis  They tried and failed conservative treatment measures and wished to proceed with surgical intervention  The risks, benefits, and complications of the procedure were discussed with the patient and informed consent was obtained  Hospital Course: The patient was admitted to the hospital on 10/4/2017 and taken back to the OR for attempted removal of hardware  However, it was seen intra-operatively that the right equipment was not present for safe removal of hardware  After opening although sets and attempting it was found that nothing that we had on hand was compatible with the extraction of this hardware  It was felt that the best thing to do was just close the wounds and Re investigate the identity of the implant was in place  A call directly to the [employer] did not reveal any useful information at the time  It was also felt that further and investigation to try to identify the extraction handle as necessary was in order prior to any further attempts to remove this hardware  There is no other way to carry out a hip replacement other than to remove the current indwelling hardware  They were transferred to the floor after a brief stay in the post-anesthesia care unit  Their pain was well managed with IV and oral pain medications  They began therapy on post operative day #1  On discharge date pt was cleared by PT and the medicine team and determined to be safe for discharge   Daily discussion was had with the patient, nursing staff, orthopaedic team, and family members if present  All questions were answered to the patients satisifaction  0  Lab Value Date/Time   HGB 11 9 09/01/2017 0701   HGB 12 9 08/29/2017 2145   HGB 12 6 08/22/2017 1445   HGB 12 9 04/25/2017 0639   HGB 12 4 03/01/2016 0740   HGB 11 9 12/16/2014 0727   HGB 8 8 (L) 10/30/2014 0626   HGB 9 0 (L) 10/27/2014 0648   HGB 8 4 (L) 10/23/2014 0640   HGB 8 9 (L) 10/20/2014 0632   HGB 9 5 (L) 10/16/2014 0608   HGB 10 4 (L) 10/15/2014 0613   HGB 9 0 (L) 10/14/2014 0549   HGB 10 2 (L) 10/13/2014 0608   HGB 12 1 10/12/2014 0826       Discharge Instructions: The patient was discharged weight bearing as tolerated to the left lower extremity  Continue PT/OT  Take pain medications as instructed  Maintain posterior hip precautions  Discharge Medications: For the complete list of discharge medications, please refer to the patient's medication reconciliation

## 2017-10-05 NOTE — OCCUPATIONAL THERAPY NOTE
OT CANCEL NOTE     Orders received  Chart review completed  Pt s/p attempted (L) LE removal of hardware  Per ortho note 10/5/17 ortho to review next surgical steps  Will continue to follow to complete OT eval as medically appropriate/able pending medical/operative plans            Kymberly Lester MS, OTR/L

## 2017-10-05 NOTE — PHYSICAL THERAPY NOTE
Physical Therapy Evaluation    Patient's Name: Harrison Rivera    Admitting Diagnosis  Primary osteoarthritis of left hip [M16 12]    Problem List  Patient Active Problem List   Diagnosis    Dizziness    Anxiety    PVD (peripheral vascular disease) ([facility] [address] )    Urinary tract infection    GERD (gastroesophageal reflux disease)    Epigastric discomfort    RLS (restless legs syndrome)    Arthritis of left hip       Past Medical History  Past Medical History:   Diagnosis Date    Anxiety     Arthritis     OSTEO    Disease of thyroid gland     HYPO    Femur neck fracture (HCC)     GERD (gastroesophageal reflux disease)     Osteoporosis     Polio     PVD (peripheral vascular disease) ([facility] [address] )     Varicella infection        Past Surgical History  Past Surgical History:   Procedure Laterality Date    HIP SURGERY      both hips replaced;2013 left ,2014 right    JOINT REPLACEMENT      HIP TOTAL      10/05/17 1020   Note Type   Note type Eval/Treat   Pain Assessment   Pain Assessment 0-10   Pain Score 9   Pain Type Acute pain;Surgical pain   Pain Location Hip   Pain Orientation Left   Effect of Pain on Daily Activities IMPAIRED OVERALL QUALITY OF MOBILILTY    Patient's Stated Pain Goal No pain   Hospital Pain Intervention(s) Cold applied; Ambulation/increased activity   Home Living   Type of [address] [place] [address] Two level;1/2 bath on main level; Able to live on main level with bedroom/bathroom  (2 VILMA WITH RAIL )   Bathroom Shower/Tub Tub/shower unit   Bathroom Toilet Standard   Bathroom Accessibility Accessible   Home Equipment Walker;Cane   Additional Comments Pt reports using a RW in the community and a SPC in her home    Prior Function   Level of [place] Independent with ADLs and functional mobility   Lives With Alone   Receives Help From Family   ADL Assistance Independent   IADLs Independent   Falls in the last 6 months 0   Vocational Retired   Comments Pt's sister will be staying with her at d/c  Restrictions/Precautions   Weight Bearing Precautions Per Order Yes   LLE Weight Bearing Per Order WBAT   Other Precautions WBS;Pain; Fall Risk;Multiple lines   General   Additional Pertinent History 10/4/17- attempted removal of hardware    Family/Caregiver Present No   Cognition   Overall Cognitive Status WFL   Arousal/Participation Alert   Orientation Level Oriented X4   Memory Within functional limits   Following Commands Follows all commands and directions without difficulty   Comments Pt is pleasant and cooperative  Able to converse beyond basic needs    RUE Assessment   RUE Assessment WFL   LUE Assessment   LUE Assessment WFL   RLE Assessment   RLE Assessment WFL  (3+/5)   LLE Assessment   LLE Assessment X  (2+/5)   Coordination   Movements are Fluid and Coordinated 0   Coordination and Movement Description ANTALGIC    Sensation WFL   Light Touch   RLE Light Touch Grossly intact   LLE Light Touch Grossly intact   Proprioception   RLE Proprioception Grossly intact   LLE Proprioception Grossly Intact   Bed Mobility   Additional Comments OOB IN CHAIR UPON ARRIVA; Transfers   Sit to Stand 4  Minimal assistance  (CGA/CS)   Additional items Assist x 1   Stand to Sit 5  Supervision   Additional items Increased time required   Stand pivot 4  Minimal assistance   Additional items (CGA/CS)   Toilet transfer 5  Supervision   Additional items Commode  (grab bars)   Ambulation/Elevation   Gait pattern Decreased L stance;Decreased foot clearance; Short stride; Step to   Gait Assistance 4  Minimal assist   Additional items (CGA/CS)   Assistive Device Rolling walker   Distance 20'   Balance   Static Sitting Good   Dynamic Sitting Fair +   Static Standing Fair   Dynamic Standing Fair -   Ambulatory Fair -   Endurance Deficit   Endurance Deficit Yes   Endurance Deficit Description pain    Activity Tolerance   Activity Tolerance Patient limited by pain   Medical Staff Made Aware Will update CM with paddy mosquera dispo   Nurse Made Aware appropriate to see per RN    Assessment   Prognosis Good   Problem List Decreased strength;Decreased range of motion;Pain;Orthopedic restrictions;Decreased skin integrity   Assessment Pt is a 67year old female presenting s/p attempted removal of hardware 10/4/17 which was unsuccessful  PT consulted to assess functional mobility and assist with safe d/c planning  PT eval performed POD #1 with WBAT orders  Pt with a problem list which includes dizziness, anxiety, PVD, UTI, GED, epigastric discomfort, RLS and arthritis of L hip  PTA, pt living at home in a 2 story home with a 1st floor set up and 2 VILMA  Pt has a RW and SPC and denies any h/o falls  Pt's sister will be living with her at d/c to assist with any needs  On eval, pt presenting with the following deficits: impaired balance, poor activity tolerance, decreased strength and ROM and decreased overall functional mobility 2* pain  Pt able to perform all activities with CGA/CS as patient ambulated with antalgic gait pattern  Will continue to follow pt during stay to progress functional mobility (I) and safety  Anticipate safe d/c home with use of RW and family support  Spoke with resident who reports pt will be d/c with no further surgical intervention to be performed this admission; will follow up  Goals   Patient Goals to go home    STG Expiration Date 10/12/17   Short Term Goal #1 Pt will be mod(I) with bed mobility and transfers  Pt will be mod(I) with ambulaiton using least restrictive AD  Pt will be S with 2 VILMA home  Pt will particiapte in HEP  Treatment Day 0   Plan   Treatment/Interventions Functional transfer training;LE strengthening/ROM; Elevations; Therapeutic exercise; Endurance training;Patient/family training;Equipment eval/education; Bed mobility;Gait training   PT Frequency 5x/wk   Recommendation   Recommendation Home with family support   Equipment Recommended Walker   Barthel Index   Feeding 10   Bathing 0   Grooming Score 5   Dressing Score 5   Bladder Score 10   Bowels Score 10   Toilet Use Score 10   Transfers (Bed/Chair) Score 10   Mobility (Level Surface) Score 0   Stairs Score 0   Barthel Index Score 60         Meredith Antoine, PT

## 2017-10-05 NOTE — PLAN OF CARE

## 2017-10-05 NOTE — PLAN OF CARE
Problem: PHYSICAL THERAPY ADULT  Goal: Performs mobility at highest level of function for planned discharge setting  See evaluation for individualized goals  Treatment/Interventions: Functional transfer training, LE strengthening/ROM, Elevations, Therapeutic exercise, Endurance training, Patient/family training, Equipment eval/education, Bed mobility, Gait training  Equipment Recommended: Shania Hernandez       See flowsheet documentation for full assessment, interventions and recommendations  Prognosis: Good  Problem List: Decreased strength, Decreased range of motion, Pain, Orthopedic restrictions, Decreased skin integrity  Assessment: Pt is a 67year old female presenting s/p attempted removal of hardware 10/4/17 which was unsuccessful  PT consulted to assess functional mobility and assist with safe d/c planning  PT eval performed POD #1 with WBAT orders  Pt with a problem list which includes dizziness, anxiety, PVD, UTI, GED, epigastric discomfort, RLS and arthritis of L hip  PTA, pt living at home in a 2 story home with a 1st floor set up and 2 VILMA  Pt has a RW and SPC and denies any h/o falls  Pt's sister will be living with her at d/c to assist with any needs  On eval, pt presenting with the following deficits: impaired balance, poor activity tolerance, decreased strength and ROM and decreased overall functional mobility 2* pain  Pt able to perform all activities with CGA/CS as patient ambulated with antalgic gait pattern  Will continue to follow pt during stay to progress functional mobility (I) and safety  Anticipate safe d/c home with use of RW and family support  Spoke with resident who reports pt will be d/c with no further surgical intervention to be performed this admission; will follow up  Recommendation: Home with family support          See flowsheet documentation for full assessment

## 2017-10-05 NOTE — SOCIAL WORK
CM met with pt to discuss d/c planning process and complete assessment  Pt admitted for OP proc with ortho  Pt presented as alert and oriented  Pt lives alone in 2 story home, 2 VILMA, IADL's PTA  Pt has Rw and cane at home  Pt has hx of VNA 3 years ago but is unable to remember name of agency  Drives self for transport and family will transport home  PCP is Amy Lozano and pt uses [facility]  Pt has her Lovenox at home  CM reviewed d/c planning process including the following: identifying help at home, patient preference for d/c planning needs, Discharge Lounge, [facility] Meds to Bed program, availability of treatment team to discuss questions or concerns patient and/or family may have regarding understanding medications and recognizing signs and symptoms once discharged  CM also encouraged patient to follow up with all recommended appointments after discharge  Patient advised of importance for patient and family to participate in managing patients medical well being

## 2017-10-06 LAB
ABO GROUP BLD BPU: NORMAL
ABO GROUP BLD BPU: NORMAL
BPU ID: NORMAL
BPU ID: NORMAL
UNIT DISPENSE STATUS: NORMAL
UNIT DISPENSE STATUS: NORMAL
UNIT PRODUCT CODE: NORMAL
UNIT PRODUCT CODE: NORMAL
UNIT RH: NORMAL
UNIT RH: NORMAL

## 2017-10-17 ENCOUNTER — GENERIC CONVERSION - ENCOUNTER (OUTPATIENT)
Dept: OTHER | Facility: OTHER | Age: 72
End: 2017-10-17

## 2017-10-17 DIAGNOSIS — Z47.89 ENCOUNTER FOR OTHER ORTHOPEDIC AFTERCARE (CODE): ICD-10-CM

## 2017-10-17 DIAGNOSIS — M25.552 PAIN IN LEFT HIP: ICD-10-CM

## 2017-11-06 ENCOUNTER — APPOINTMENT (OUTPATIENT)
Dept: LAB | Facility: HOSPITAL | Age: 72
End: 2017-11-06
Payer: MEDICARE

## 2017-11-06 ENCOUNTER — TRANSCRIBE ORDERS (OUTPATIENT)
Dept: LAB | Facility: HOSPITAL | Age: 72
End: 2017-11-06

## 2017-11-06 DIAGNOSIS — E03.9 HYPOTHYROIDISM: ICD-10-CM

## 2017-11-06 DIAGNOSIS — M81.0 AGE-RELATED OSTEOPOROSIS WITHOUT CURRENT PATHOLOGICAL FRACTURE: ICD-10-CM

## 2017-11-06 DIAGNOSIS — F41.9 ANXIETY DISORDER: ICD-10-CM

## 2017-11-06 DIAGNOSIS — Z86.39 PERSONAL HISTORY OF OTHER ENDOCRINE, NUTRITIONAL AND METABOLIC DISEASE: ICD-10-CM

## 2017-11-06 DIAGNOSIS — K21.9 GASTRO-ESOPHAGEAL REFLUX DISEASE WITHOUT ESOPHAGITIS: ICD-10-CM

## 2017-11-06 LAB
ALBUMIN SERPL BCP-MCNC: 3.5 G/DL (ref 3.5–5)
ALP SERPL-CCNC: 110 U/L (ref 46–116)
ALT SERPL W P-5'-P-CCNC: 20 U/L (ref 12–78)
ANION GAP SERPL CALCULATED.3IONS-SCNC: 6 MMOL/L (ref 4–13)
AST SERPL W P-5'-P-CCNC: 13 U/L (ref 5–45)
BASOPHILS # BLD AUTO: 0.01 THOUSANDS/ΜL (ref 0–0.1)
BASOPHILS NFR BLD AUTO: 0 % (ref 0–1)
BILIRUB SERPL-MCNC: 0.31 MG/DL (ref 0.2–1)
BUN SERPL-MCNC: 14 MG/DL (ref 5–25)
CALCIUM SERPL-MCNC: 9 MG/DL (ref 8.3–10.1)
CHLORIDE SERPL-SCNC: 106 MMOL/L (ref 100–108)
CO2 SERPL-SCNC: 30 MMOL/L (ref 21–32)
CREAT SERPL-MCNC: 0.56 MG/DL (ref 0.6–1.3)
EOSINOPHIL # BLD AUTO: 0.06 THOUSAND/ΜL (ref 0–0.61)
EOSINOPHIL NFR BLD AUTO: 1 % (ref 0–6)
ERYTHROCYTE [DISTWIDTH] IN BLOOD BY AUTOMATED COUNT: 13.9 % (ref 11.6–15.1)
GFR SERPL CREATININE-BSD FRML MDRD: 93 ML/MIN/1.73SQ M
GLUCOSE P FAST SERPL-MCNC: 87 MG/DL (ref 65–99)
HCT VFR BLD AUTO: 38.1 % (ref 34.8–46.1)
HGB BLD-MCNC: 12 G/DL (ref 11.5–15.4)
LYMPHOCYTES # BLD AUTO: 1.56 THOUSANDS/ΜL (ref 0.6–4.47)
LYMPHOCYTES NFR BLD AUTO: 24 % (ref 14–44)
MCH RBC QN AUTO: 27.7 PG (ref 26.8–34.3)
MCHC RBC AUTO-ENTMCNC: 31.5 G/DL (ref 31.4–37.4)
MCV RBC AUTO: 88 FL (ref 82–98)
MONOCYTES # BLD AUTO: 0.6 THOUSAND/ΜL (ref 0.17–1.22)
MONOCYTES NFR BLD AUTO: 9 % (ref 4–12)
NEUTROPHILS # BLD AUTO: 4.34 THOUSANDS/ΜL (ref 1.85–7.62)
NEUTS SEG NFR BLD AUTO: 66 % (ref 43–75)
NRBC BLD AUTO-RTO: 0 /100 WBCS
PLATELET # BLD AUTO: 278 THOUSANDS/UL (ref 149–390)
PMV BLD AUTO: 9.6 FL (ref 8.9–12.7)
POTASSIUM SERPL-SCNC: 4.2 MMOL/L (ref 3.5–5.3)
PROT SERPL-MCNC: 6.7 G/DL (ref 6.4–8.2)
RBC # BLD AUTO: 4.33 MILLION/UL (ref 3.81–5.12)
SODIUM SERPL-SCNC: 142 MMOL/L (ref 136–145)
TSH SERPL DL<=0.05 MIU/L-ACNC: 3.22 UIU/ML (ref 0.36–3.74)
WBC # BLD AUTO: 6.58 THOUSAND/UL (ref 4.31–10.16)

## 2017-11-06 PROCEDURE — 85025 COMPLETE CBC W/AUTO DIFF WBC: CPT

## 2017-11-06 PROCEDURE — 80053 COMPREHEN METABOLIC PANEL: CPT

## 2017-11-06 PROCEDURE — 36415 COLL VENOUS BLD VENIPUNCTURE: CPT

## 2017-11-06 PROCEDURE — 84443 ASSAY THYROID STIM HORMONE: CPT

## 2017-11-09 ENCOUNTER — APPOINTMENT (OUTPATIENT)
Dept: LAB | Facility: HOSPITAL | Age: 72
End: 2017-11-09
Attending: ORTHOPAEDIC SURGERY
Payer: MEDICARE

## 2017-11-09 ENCOUNTER — TRANSCRIBE ORDERS (OUTPATIENT)
Dept: LAB | Facility: HOSPITAL | Age: 72
End: 2017-11-09

## 2017-11-09 ENCOUNTER — GENERIC CONVERSION - ENCOUNTER (OUTPATIENT)
Dept: OTHER | Facility: OTHER | Age: 72
End: 2017-11-09

## 2017-11-09 ENCOUNTER — HOSPITAL ENCOUNTER (OUTPATIENT)
Dept: RADIOLOGY | Facility: HOSPITAL | Age: 72
Discharge: HOME | End: 2017-11-09
Attending: ORTHOPAEDIC SURGERY
Payer: MEDICARE

## 2017-11-09 DIAGNOSIS — M25.552 PAIN IN LEFT HIP: ICD-10-CM

## 2017-11-09 LAB
ABO GROUP BLD: NORMAL
APTT PPP: 25 SECONDS (ref 23–35)
BILIRUB UR QL STRIP: NEGATIVE
BLD GP AB SCN SERPL QL: NEGATIVE
CLARITY UR: CLEAR
COLOR UR: YELLOW
GLUCOSE UR STRIP-MCNC: NEGATIVE MG/DL
HGB UR QL STRIP.AUTO: NEGATIVE
INR PPP: 0.95 (ref 0.86–1.16)
KETONES UR STRIP-MCNC: NEGATIVE MG/DL
LEUKOCYTE ESTERASE UR QL STRIP: NEGATIVE
NITRITE UR QL STRIP: NEGATIVE
PH UR STRIP.AUTO: 7.5 [PH] (ref 4.5–8)
PROT UR STRIP-MCNC: NEGATIVE MG/DL
PROTHROMBIN TIME: 12.7 SECONDS (ref 12.1–14.4)
RH BLD: POSITIVE
SP GR UR STRIP.AUTO: 1.01 (ref 1–1.03)
SPECIMEN EXPIRATION DATE: NORMAL
UROBILINOGEN UR QL STRIP.AUTO: 0.2 E.U./DL

## 2017-11-09 PROCEDURE — 73502 X-RAY EXAM HIP UNI 2-3 VIEWS: CPT

## 2017-11-09 PROCEDURE — 86850 RBC ANTIBODY SCREEN: CPT

## 2017-11-09 PROCEDURE — 85610 PROTHROMBIN TIME: CPT

## 2017-11-09 PROCEDURE — 85730 THROMBOPLASTIN TIME PARTIAL: CPT

## 2017-11-09 PROCEDURE — 86901 BLOOD TYPING SEROLOGIC RH(D): CPT

## 2017-11-09 PROCEDURE — 36415 COLL VENOUS BLD VENIPUNCTURE: CPT

## 2017-11-09 PROCEDURE — 81003 URINALYSIS AUTO W/O SCOPE: CPT

## 2017-11-09 PROCEDURE — 86900 BLOOD TYPING SEROLOGIC ABO: CPT

## 2017-11-14 ENCOUNTER — ALLSCRIPTS OFFICE VISIT (OUTPATIENT)
Dept: OTHER | Facility: OTHER | Age: 72
End: 2017-11-14

## 2017-11-15 NOTE — PROGRESS NOTES
Assessment    1  Preop examination (V72 84) (Z01 818)  2  Left hip pain (719 45) (P77 299)    Discussion/Summary  Surgical Clearance: She is at a LOW risk from a cardiovascular standpoint at this time without any additional cardiac testing  Reevaluation needed, if she should present with symptoms prior to surgery/procedure  Surgical clearance faxed to Dr Amanda Albert   History of Present Illness  Pre-Op Visit (Brief): The patient is being seen for a preoperative visit  The procedure is a(n) left hip nail removal and arthroplasty scheduled for 11/27/17 with Avanell Flax  The indication for surgery is left hip pain  Surgical Risk Assessment:  Prior Anesthesia: She had prior anesthesia-- and-- no prior adverse reaction to general anesthesia  Pertinent Past Medical History: no pertinent past medical history  Exercise Capacity: able to walk four blocks without symptoms-- and-- able to walk two flights of stairs without symptoms  Lifestyle Factors: denies alcohol use, denies tobacco use and denies illegal drug use  Symptoms: no symptoms,-- no chest pain,-- no cough,-- no dyspnea,-- no edema,-- no palpitations-- and-- no wheezing  Pertinent Family History: no pertinent family history  Living Situation: home is secure and supportive  Review of Systems   Constitutional: No fever, no chills, feels well, no tiredness, no recent weight gain or weight loss  Eyes: No complaints of eye pain, no red eyes, no eyesight problems, no discharge, no dry eyes, no itching of eyes  ENT: no complaints of earache, no loss of hearing, no nose bleeds, no nasal discharge, no sore throat, no hoarseness  Cardiovascular: No complaints of slow heart rate, no fast heart rate, no chest pain, no palpitations, no leg claudication, no lower extremity edema  Respiratory: No complaints of shortness of breath, no wheezing, no cough, no SOB on exertion, no orthopnea, no PND    Gastrointestinal: No complaints of abdominal pain, no constipation, no nausea or vomiting, no diarrhea, no bloody stools  Genitourinary: No complaints of dysuria, no incontinence, no pelvic pain, no dysmenorrhea, no vaginal discharge or bleeding  Musculoskeletal: as noted in HPI  Integumentary: No complaints of skin rash or lesions, no itching, no skin wounds, no breast pain or lump  Neurological: No complaints of headache, no confusion, no convulsions, no numbness, no dizziness or fainting, no tingling, no limb weakness, no difficulty walking  Psychiatric: Not suicidal, no sleep disturbance, no anxiety or depression, no change in personality, no emotional problems  Endocrine: No complaints of proptosis, no hot flashes, no muscle weakness, no deepening of the voice, no feelings of weakness  Hematologic/Lymphatic: No complaints of swollen glands, no swollen glands in the neck, does not bleed easily, does not bruise easily  Active Problems  1  Aftercare following surgery of the musculoskeletal system (V58 78) (Z47 89)  2  Anxiety (300 00) (F41 9)  3  Closed fracture of femur, neck (820 8) (S72 009A)  4  Counseling for sexually transmitted disease (V65 45) (Z70 8)  5  Elevated alkaline phosphatase level (790 5) (R74 8)  6  Encounter for screening colonoscopy (V76 51) (Z12 11)  7  Encounter for screening mammogram for breast cancer (V76 12) (Z12 31)  8  GERD (gastroesophageal reflux disease) (530 81) (K21 9)  9  Hair loss (704 00) (L65 9)  10  Hospital discharge follow-up (V67 59) (Z09)  11  Hypothyroidism (244 9) (E03 9)  12  Influenza vaccine needed (V04 81) (Z23)  13  Left hip pain (719 45) (M25 552)  14  Need for vaccination with 13-polyvalent pneumococcal conjugate vaccine (V03 82) (Z23)  15  Osteoporosis (733 00) (M81 0)  16  Peripheral vascular disease (443 9) (I73 9)  17  Preop examination (V72 84) (Z01 818)  18  Primary localized osteoarthritis of left hip (715 15) (M16 12)  19  RBBB (426 4) (I45 10)  20  Screening for osteoporosis (V82 81) (Z13 820)  21   Urinary tract infection due to Proteus (599 0,041 6) (N39 0,B96 4)    Past Medical History   · History of appendicitis (V12 79) (Z87 19)   · History of depression (V11 8) (Z86 59)   · History of headache (V13 89) (L41 394)   · History of hyperthyroidism (V12 29) (Z86 39)   · History of varicella (V12 09) (Z86 19)   · History of Polio (045 90) (A80 9)    The active problems and past medical history were reviewed and updated today  Surgical History   · History of Appendectomy   · History of Tonsillectomy   · History of Total Hip Replacement   · History of Treatment Of Hip Fracture    The surgical history was reviewed and updated today  Family History  Mother    · Family history of rheumatoid arthritis (V17 7) (Z82 61)  Sister    · Family history of rheumatoid arthritis (V17 7) (Z82 61)  Brother    · Family history of malignant neoplasm (V16 9) (Z80 9)  Family History    · Family history of No cardiac disease    Social History     · Always uses seat belt   · Caffeine use (V49 89) (F15 90)   · Denied: History of Drug use   · Never a smoker   · No alcohol use   · Non-smoker (V49 89) (Z78 9)   · One child   · Retired   · [employer]   · Single  The social history was reviewed and updated today  Current Meds  1  Alendronate Sodium 70 MG Oral Tablet; TAKE 1 TABLET ONCE WEEKLY; Therapy: 43MCS7776 to (Evaluate:20Jan2018)  Requested for: 28Oct2017; Last Rx:28Oct2017 Ordered  2  Centrum Silver Oral Tablet; TAKE 1 TABLET DAILY; Therapy: 04SWH3179 to Recorded  3  DiazePAM 5 MG Oral Tablet; TAKE 1 TABLET AT BEDTIME; Therapy: 50LDN8412 to (Evaluate:11Nov2017); Last Rx:40Uxe2552 Ordered  4  Famotidine 20 MG Oral Tablet; TAKE ONE TABLET BY MOUTH EVERY 12 HOURS; Therapy: 91QWU6911 to (Evaluate:21Apr2017)  Requested for: 48Nom2421; Last Rx:42Jju3149 Ordered  5  Folic Acid 1 MG Oral Tablet; TAKE 1 TABLET DAILY; Therapy: 66Rpn9459 to [phone]  Requested for: 30Oct2017; Last Rx:24Wsr4827 Ordered  6   Gabapentin 100 MG Oral Capsule; TAKE ONE CAPSULE BY MOUTH THREE TIMES A DAY; Therapy: 41HDC8947 to (Evaluate:19Dec2017)  Requested for: 34NWU6957; Last Rx:22Jun2017 Ordered  7  Ibuprofen 600 MG Oral Tablet; TAKE 1 TABLET Every 8 hours; Therapy: 81UZD7148 to (Evaluate:11Mar2017)  Requested for: 49AZM2972; Last Rx:01Mar2017 Ordered  8  Levothyroxine Sodium 25 MCG Oral Tablet; take one tablet by mouth every day; Therapy: 34IUR1137 to (Evaluate:08Lvo8089)  Requested for: 69Par7661; Last Rx:62Add4657 Ordered  9  Nortriptyline HCl - 10 MG Oral Capsule; TAKE ONE CAPSULE BY MOUTH AT BEDTIME; Therapy: 81TSZ4087 to (Evaluate:19Dec2017)  Requested for: 07EUL3793; Last Rx:22Jun2017 Ordered  10  Nortriptyline HCl - 25 MG Oral Capsule; TAKE ONE CAPSULE BY MOUTH AT BEDTIME; Therapy: 63CIE2032 to (Evaluate:19Dec2017)  Requested for: 13XZI0411; Last  Rx:22Jun2017 Ordered  11  OxyCODONE HCl - 5 MG Oral Tablet; TAKE 1 TO 2 TABLETS EVERY 6 HOURS AS  NEEDED FOR PAIN;  Therapy: 62ZGO1958 to (Evaluate:14Nov2017); Last Rx:09Nov2017 Ordered  12  Vitamin C Plus 1000 MG Oral Tablet; TAKE 1 TABLET DAILY; Therapy: 57MIF9884 to Recorded  13  Vitamin D 1000 UNIT Oral Tablet; TAKE 1 TABLET DAILY; Therapy: 89XTW5611 to (Evaluate:53Mlu2187) Recorded  14  Vitamin E 400 UNIT Oral Tablet; take 2 tablet daily; Therapy: 16AFF4154 to Recorded    The medication list was reviewed and updated today  Allergies  1  No Known Drug Allergies    Vitals   Recorded: 31INF9676 02:04PM   Temperature 98 3 F   Heart Rate 78   Respiration 20   Systolic 255   Diastolic 90   Height 5 ft 1 in   Weight 103 lb    BMI Calculated 19 46   BSA Calculated 1 42   O2 Saturation 98   Pain Scale 7       Physical Exam   Constitutional  General appearance: No acute distress, well appearing and well nourished  Head and Face  Head and face: Normal    Eyes1   Conjunctiva and lids: No swelling, erythema or discharge  1   Ears, Nose, Mouth, and Throat1   Otoscopic examination: Tympanic membranes translucent with normal light reflex  Canals patent without erythema  1   Neck1   Neck: Supple, symmetric, trachea midline, no masses  1   Pulmonary1   Respiratory effort: No increased work of breathing or signs of respiratory distress  1   Auscultation of lungs: Clear to auscultation  1   Cardiovascular1   Auscultation of heart: Normal rate and rhythm, normal S1 and S2, no murmurs  1   Examination of extremities for edema and/or varicosities: Normal 1   Chest1   Chest: Normal 1   Abdomen1   Abdomen: Non-tender, no masses  1   Musculoskeletal1   Gait and station: Abnormal  1   Skin1   Skin and subcutaneous tissue: Normal without rashes or lesions  1   Neurologic1   Cranial nerves: Cranial nerves II-XII intact  1   Psychiatric1   Mood and affect: Normal 1        1 Amended By: Brennan Leyva; Nov 27 2017 9:13 AM EST    Results/Data  (1) TSH WITH FT4 REFLEX 74XJX4140 06:38AM [facility] Order Number: [id number]     Test Name Result Flag Reference   TSH 3 220 uIU/mL  0 358-3 740     Patients undergoing fluorescein dye angiography may retain small amounts of fluorescein in the body for 48-72 hours post procedure  Samples containing fluorescein can produce falsely depressed TSH values  If the patient had this procedure,a specimen should be resubmitted post fluorescein clearance  The recommended reference ranges for TSH during pregnancy are as follows: First trimester 0 1 to 2 5 uIU/mL Second trimester  0 2 to 3 0 uIU/mL Third trimester 0 3 to 3 0 uIU/m     (1) CBC/PLT/DIFF 48DPU1930 06:38AM Critical access hospital Order Number: [id number]     Test Name Result Flag Reference   WBC COUNT 6 58 Thousand/uL  4 31-10 16   RBC COUNT 4 33 Million/uL  3 81-5 12   HEMOGLOBIN 12 0 g/dL  11 5-15 4   HEMATOCRIT 38 1 %  34 8-46  1   MCV 88 fL  82-98   MCH 27 7 pg  26 8-34 3   MCHC 31 5 g/dL  31 4-37 4   RDW 13 9 %  11 6-15 1   MPV 9 6 fL  8 9-12 7   PLATELET COUNT 557 Thousands/uL  149-390   nRBC AUTOMATED 0 /100 WBCs     NEUTROPHILS RELATIVE PERCENT 66 %  43-75   LYMPHOCYTES RELATIVE PERCENT 24 %  14-44   MONOCYTES RELATIVE PERCENT 9 %  4-12   EOSINOPHILS RELATIVE PERCENT 1 %  0-6   BASOPHILS RELATIVE PERCENT 0 %  0-1   NEUTROPHILS ABSOLUTE COUNT 4 34 Thousands/? ??L  1 85-7 62   LYMPHOCYTES ABSOLUTE COUNT 1 56 Thousands/? ??L  0 60-4 47   MONOCYTES ABSOLUTE COUNT 0 60 Thousand/? ??L  0 17-1 22   EOSINOPHILS ABSOLUTE COUNT 0 06 Thousand/? ??L  0 00-0 61   BASOPHILS ABSOLUTE COUNT 0 01 Thousands/? ??L  0 00-0 10     (1) COMPREHENSIVE METABOLIC PANEL [id number] 18:75MR Marni Monteiro Order Number: [id number]     Test Name Result Flag Reference   SODIUM 142 mmol/L  136-145   POTASSIUM 4 2 mmol/L  3 5-5 3   CHLORIDE 106 mmol/L  100-108   CARBON DIOXIDE 30 mmol/L  21-32   ANION GAP (CALC) 6 mmol/L  4-13   BLOOD UREA NITROGEN 14 mg/dL  5-25   CREATININE 0 56 mg/dL L 0 60-1 30   Standardized to IDMS reference method   CALCIUM 9 0 mg/dL  8 3-10 1   BILI, TOTAL 0 31 mg/dL  0 20-1 00   ALK PHOSPHATAS 110 U/L     ALT (SGPT) 20 U/L  12-78   Specimen collection should occur prior to Sulfasalazine and/or Sulfapyridine administration due to the potential for falsely depressed results  AST(SGOT) 13 U/L  5-45   Specimen collection should occur prior to Sulfasalazine administration due to the potential for falsely depressed results  ALBUMIN 3 5 g/dL  3 5-5 0   TOTAL PROTEIN 6 7 g/dL  6 4-8 2   eGFR 93 ml/min/1 73sq m       National Kidney Disease Education Program recommendations are as follows: GFR calculation is accurate only with a steady state creatinine Chronic Kidney disease less than 60 ml/min/1 73 sq  meters Kidney failure less than 15 ml/min/1 73 sq  meters  GLUCOSE FASTING 87 mg/dL  65-99   Specimen collection should occur prior to Sulfasalazine administration due to the potential for falsely depressed results   Specimen collection should occur prior to Sulfapyridine administration due to the potential for falsely elevated results  No acute ischemia  Rhythm and rate: normal sinus rhythm  QRS: right bundle branch block  End of Encounter Meds    1  OxyCODONE HCl - 5 MG Oral Tablet; TAKE 1 TO 2 TABLETS EVERY 6 HOURS AS NEEDED FOR PAIN; Therapy: 59LSR7106 to (Evaluate:14Nov2017); Last Rx:09Nov2017 Ordered    2  DiazePAM 5 MG Oral Tablet; TAKE 1 TABLET AT BEDTIME; Therapy: 92OQB3549 to (Evaluate:11Nov2017); Last Rx:12Oct2017 Ordered  3  Nortriptyline HCl - 10 MG Oral Capsule; TAKE ONE CAPSULE BY MOUTH AT BEDTIME; Therapy: 37WJA1918 to (Evaluate:27Ytl7008)  Requested for: 62NYI4133; Last Rx:22Jun2017 Ordered  4  Nortriptyline HCl - 25 MG Oral Capsule; TAKE ONE CAPSULE BY MOUTH AT BEDTIME; Therapy: 65ATP4456 to (Evaluate:66Gwq5037)  Requested for: 51OWB7503; Last Rx:22Jun2017 Ordered    5  Gabapentin 100 MG Oral Capsule; TAKE ONE CAPSULE BY MOUTH THREE TIMES A DAY; Therapy: 70CYP4686 to (Evaluate:38Scn7142)  Requested for: 37JML7660; Last Rx:22Jun2017 Ordered    6  Famotidine 20 MG Oral Tablet; TAKE ONE TABLET BY MOUTH EVERY 12 HOURS; Therapy: 68NHH6522 to (Evaluate:21Apr2017)  Requested for: 87Oxr9367; Last Rx:98Pmx5390 Ordered    7  Folic Acid 1 MG Oral Tablet; TAKE 1 TABLET DAILY; Therapy: 09Boq3352 to [phone]  Requested for: 30Oct2017; Last Rx:30Oct2017 Ordered    8  Levothyroxine Sodium 25 MCG Oral Tablet; take one tablet by mouth every day; Therapy: 34IKC7137 to (Evaluate:39Shh7613)  Requested for: 55Nlb0859; Last Rx:16Tce6017 Ordered    9  Ibuprofen 600 MG Oral Tablet; TAKE 1 TABLET Every 8 hours; Therapy: 96AVH1460 to (Evaluate:11Mar2017)  Requested for: 75CAS3859; Last Rx:01Mar2017 Ordered    10  Alendronate Sodium 70 MG Oral Tablet; TAKE 1 TABLET ONCE WEEKLY; Therapy: 90FJF5512 to (Evaluate:73Xsu8205)  Requested for: 28Oct2017; Last  Rx:28Oct2017 Ordered    11  Centrum Silver Oral Tablet; TAKE 1 TABLET DAILY; Therapy: 98XRN1995 to Recorded  12   Vitamin C Plus 1000 MG Oral Tablet; TAKE 1 TABLET DAILY; Therapy: 33DZJ8452 to Recorded  13  Vitamin D 1000 UNIT Oral Tablet; TAKE 1 TABLET DAILY; Therapy: 93PZX7226 to (Evaluate:63Dtq4766) Recorded  14  Vitamin E 400 UNIT Oral Tablet; take 2 tablet daily; Therapy: 20WPG8005 to Recorded    Future Appointments    Date/Time Provider Specialty Site   01/24/2018 01:00 PM JACEY Dasilva 6   11/27/2017 09:45 AM JACEY Martínez  [address] [place] [address]   12/05/2017 10:45 AM JACEY Martínez  Orthopedic Surgery [facility]   12/12/2017 10:45 AM JACEY Martínez   Orthopedic Surgery [address]       Signatures   Electronically signed by : JACEY Atkinson ; Nov 27 2017  9:13AM EST                       (Author)

## 2017-11-16 ENCOUNTER — GENERIC CONVERSION - ENCOUNTER (OUTPATIENT)
Dept: OTHER | Facility: OTHER | Age: 72
End: 2017-11-16

## 2017-11-20 ENCOUNTER — GENERIC CONVERSION - ENCOUNTER (OUTPATIENT)
Dept: OTHER | Facility: OTHER | Age: 72
End: 2017-11-20

## 2017-11-22 ENCOUNTER — ANESTHESIA EVENT (OUTPATIENT)
Dept: PERIOP | Facility: HOSPITAL | Age: 72
DRG: 470 | End: 2017-11-22
Payer: MEDICARE

## 2017-11-22 RX ORDER — OXYCODONE HYDROCHLORIDE 5 MG/1
5 CAPSULE ORAL EVERY 6 HOURS PRN
Status: ON HOLD | COMMUNITY
End: 2017-11-27

## 2017-11-22 NOTE — PRE-PROCEDURE INSTRUCTIONS
Pre-Surgery Instructions:   Medication Instructions    alendronate (FOSAMAX) 70 mg tablet Patient was instructed per "E-Preop"    Ascorbic Acid (VITAMIN C) 1000 MG tablet Patient was instructed per "E-Preop"    cholecalciferol (VITAMIN D3) 1,000 units tablet Patient was instructed per "E-Preop"    diazepam (VALIUM) 5 mg tablet Patient was instructed per "E-Preop"    folic acid (FOLVITE) 1 mg tablet Patient was instructed per "E-Preop"    gabapentin (NEURONTIN) 100 mg capsule Patient was instructed per "E-Preop"    ibuprofen (MOTRIN IB) 200 mg tablet Patient was instructed per "E-Preop"    levothyroxine 25 mcg tablet Patient was instructed per "E-Preop"    Multiple Vitamins-Minerals (CENTRUM SILVER PO) Patient was instructed per "E-Preop"    nortriptyline (PAMELOR) 10 mg capsule Patient was instructed per "E-Preop"    nortriptyline (PAMELOR) 25 mg capsule Patient was instructed per "E-Preop"    oxyCODONE (OXY-IR) 5 MG capsule Patient was instructed per "E-Preop"    pantoprazole (PROTONIX) 40 mg tablet Patient was instructed per "E-Preop"    Psyllium (METAMUCIL FIBER PO) Patient was instructed per "E-Preop"    Vitamin E 400 units TABS Patient was instructed per "E-Preop"    Pre op instructions reviewed; verbalized understanding  Medication Instruction (Benzodiazepine)    Please continue the following medications, if needed, up to and including the day of surgery (with a sip of water)  DiazePAM 5 MG Oral Tablet          Medication Instruction (Antacids)    Please continue to take this medication on your normal schedule  If this is an oral medication and you take in the morning, you may do so with a sip of water  Famotidine 20 MG Oral Tablet         Medication Instruction (Neurological Medication)    Please continue to take this medication on your normal schedule  If this is an oral medication and you take in the morning, you may do so with a sip of water          Gabapentin 100 MG Oral Capsule         NPO Instructions    Please do not eat or drink anything prior to your surgery as follows: For AM Surgery times = stop at midnight the night before  For PM Surgery times = Midnight to 8AM clear liquids only (Jello, broth, 7up, Sprite, apple juice, black coffee, black tea, Gatorade)  If you are supposed to take any of your medications, do so with a sip of water  Failure to follow these instructions can lead to an increased risk of lung complications and may result in a delay or cancellation of your procedure  If you have any questions, contact your institution for further instructions  Medical Procedure Risk       Medication Instruction (NSAID - Pain Medication)    Please stop ibuprofen, naproxen and other non-steroidal anti-inflammatory drugs (NSAIDS) for 1 week before surgery  Ibuprofen 600 MG Oral Tablet         Medication Instruction (Opioids - Pain Medication)    Please continue the following medications, if needed, up to and including the day of surgery (with a sip of water)  OxyCODONE HCl - 5 MG Oral Tablet         Medication Instruction (Thyroxine - Synthroid)    Please continue to take this medication on your normal schedule  If this is an oral medication and you take in the morning, you may do so with a sip of water           Levothyroxine Sodium 25 MCG Oral Tablet

## 2017-11-24 RX ORDER — GABAPENTIN 100 MG/1
100 CAPSULE ORAL ONCE
Status: CANCELLED | OUTPATIENT
Start: 2017-11-27

## 2017-11-27 ENCOUNTER — HOSPITAL ENCOUNTER (OUTPATIENT)
Dept: RADIOLOGY | Facility: HOSPITAL | Age: 72
Setting detail: SURGERY ADMIT
Discharge: HOME | DRG: 470 | End: 2017-11-27
Payer: MEDICARE

## 2017-11-27 ENCOUNTER — ANESTHESIA (OUTPATIENT)
Dept: PERIOP | Facility: HOSPITAL | Age: 72
DRG: 470 | End: 2017-11-27
Payer: MEDICARE

## 2017-11-27 ENCOUNTER — HOSPITAL ENCOUNTER (INPATIENT)
Facility: HOSPITAL | Age: 72
LOS: 4 days | Discharge: NURSING, REHAB OR LONG-TERM CARE | DRG: 470 | End: 2017-12-01
Attending: ORTHOPAEDIC SURGERY | Admitting: ORTHOPAEDIC SURGERY
Payer: MEDICARE

## 2017-11-27 ENCOUNTER — APPOINTMENT (INPATIENT)
Dept: RADIOLOGY | Facility: HOSPITAL | Age: 72
DRG: 470 | End: 2017-11-27
Payer: MEDICARE

## 2017-11-27 DIAGNOSIS — Z96.642 STATUS POST TOTAL REPLACEMENT OF LEFT HIP: Primary | ICD-10-CM

## 2017-11-27 DIAGNOSIS — I73.9 PVD (PERIPHERAL VASCULAR DISEASE) (HCC): Chronic | ICD-10-CM

## 2017-11-27 DIAGNOSIS — M16.12 PRIMARY OSTEOARTHRITIS OF LEFT HIP: ICD-10-CM

## 2017-11-27 DIAGNOSIS — K21.9 GASTROESOPHAGEAL REFLUX DISEASE, ESOPHAGITIS PRESENCE NOT SPECIFIED: ICD-10-CM

## 2017-11-27 PROBLEM — Z96.649 S/P TOTAL HIP ARTHROPLASTY: Status: ACTIVE | Noted: 2017-11-27

## 2017-11-27 PROCEDURE — G8979 MOBILITY GOAL STATUS: HCPCS

## 2017-11-27 PROCEDURE — 97163 PT EVAL HIGH COMPLEX 45 MIN: CPT

## 2017-11-27 PROCEDURE — C1713 ANCHOR/SCREW BN/BN,TIS/BN: HCPCS | Performed by: ORTHOPAEDIC SURGERY

## 2017-11-27 PROCEDURE — C1776 JOINT DEVICE (IMPLANTABLE): HCPCS | Performed by: ORTHOPAEDIC SURGERY

## 2017-11-27 PROCEDURE — 86850 RBC ANTIBODY SCREEN: CPT | Performed by: ORTHOPAEDIC SURGERY

## 2017-11-27 PROCEDURE — 0SRB0JA REPLACEMENT OF LEFT HIP JOINT WITH SYNTHETIC SUBSTITUTE, UNCEMENTED, OPEN APPROACH: ICD-10-PCS | Performed by: ORTHOPAEDIC SURGERY

## 2017-11-27 PROCEDURE — 86900 BLOOD TYPING SEROLOGIC ABO: CPT | Performed by: ORTHOPAEDIC SURGERY

## 2017-11-27 PROCEDURE — 86923 COMPATIBILITY TEST ELECTRIC: CPT

## 2017-11-27 PROCEDURE — 73501 X-RAY EXAM HIP UNI 1 VIEW: CPT

## 2017-11-27 PROCEDURE — 86901 BLOOD TYPING SEROLOGIC RH(D): CPT | Performed by: ORTHOPAEDIC SURGERY

## 2017-11-27 PROCEDURE — 0SHB04Z INSERTION OF INTERNAL FIXATION DEVICE INTO LEFT HIP JOINT, OPEN APPROACH: ICD-10-PCS | Performed by: ORTHOPAEDIC SURGERY

## 2017-11-27 PROCEDURE — G8978 MOBILITY CURRENT STATUS: HCPCS

## 2017-11-27 DEVICE — PINNACLE CANCELLOUS BONE SCREW 6.5MM X 20MM
Type: IMPLANTABLE DEVICE | Site: HIP | Status: FUNCTIONAL
Brand: PINNACLE

## 2017-11-27 DEVICE — ARTICUL/EZE FEMORAL HEAD DIAMETER 28MM +1.5 12/14 TAPER
Type: IMPLANTABLE DEVICE | Site: HIP | Status: FUNCTIONAL
Brand: ARTICUL/EZE

## 2017-11-27 DEVICE — CORAIL HIP SYSTEM CEMENTLESS FEMORAL STEM HA COATED REVISION 12/14 AMT 135 DEGREES STANDARD COLLAR SIZE 10
Type: IMPLANTABLE DEVICE | Site: HIP | Status: FUNCTIONAL
Brand: CORAIL

## 2017-11-27 DEVICE — PINNACLE HIP SOLUTIONS ALTRX POLYETHYLENE ACETABULAR LINER NEUTRAL 28MM ID 44MM OD
Type: IMPLANTABLE DEVICE | Site: HIP | Status: FUNCTIONAL
Brand: PINNACLE ALTRX

## 2017-11-27 DEVICE — PINNACLE HIP SOLUTIONS POROCOAT ACETABULAR SHELL BANTAM 44MM OD
Type: IMPLANTABLE DEVICE | Site: HIP | Status: FUNCTIONAL
Brand: PINNACLE

## 2017-11-27 RX ORDER — LIDOCAINE HYDROCHLORIDE 20 MG/ML
INJECTION, SOLUTION EPIDURAL; INFILTRATION; INTRACAUDAL; PERINEURAL AS NEEDED
Status: DISCONTINUED | OUTPATIENT
Start: 2017-11-27 | End: 2017-11-27 | Stop reason: SURG

## 2017-11-27 RX ORDER — MIDAZOLAM HYDROCHLORIDE 1 MG/ML
INJECTION INTRAMUSCULAR; INTRAVENOUS AS NEEDED
Status: DISCONTINUED | OUTPATIENT
Start: 2017-11-27 | End: 2017-11-27 | Stop reason: SURG

## 2017-11-27 RX ORDER — GABAPENTIN 100 MG/1
100 CAPSULE ORAL 3 TIMES DAILY
Status: DISCONTINUED | OUTPATIENT
Start: 2017-11-27 | End: 2017-12-01 | Stop reason: HOSPADM

## 2017-11-27 RX ORDER — EPHEDRINE SULFATE 50 MG/ML
INJECTION, SOLUTION INTRAVENOUS AS NEEDED
Status: DISCONTINUED | OUTPATIENT
Start: 2017-11-27 | End: 2017-11-27 | Stop reason: SURG

## 2017-11-27 RX ORDER — ROCURONIUM BROMIDE 10 MG/ML
INJECTION, SOLUTION INTRAVENOUS AS NEEDED
Status: DISCONTINUED | OUTPATIENT
Start: 2017-11-27 | End: 2017-11-27

## 2017-11-27 RX ORDER — FENTANYL CITRATE/PF 50 MCG/ML
50 SYRINGE (ML) INJECTION AS NEEDED
Status: DISCONTINUED | OUTPATIENT
Start: 2017-11-27 | End: 2017-11-27 | Stop reason: HOSPADM

## 2017-11-27 RX ORDER — DIAZEPAM 5 MG/1
5 TABLET ORAL
COMMUNITY
End: 2017-12-06

## 2017-11-27 RX ORDER — SODIUM CHLORIDE, SODIUM LACTATE, POTASSIUM CHLORIDE, CALCIUM CHLORIDE 600; 310; 30; 20 MG/100ML; MG/100ML; MG/100ML; MG/100ML
1.5 INJECTION, SOLUTION INTRAVENOUS CONTINUOUS
Status: DISCONTINUED | OUTPATIENT
Start: 2017-11-27 | End: 2017-12-01

## 2017-11-27 RX ORDER — CALCIUM CARBONATE 200(500)MG
1000 TABLET,CHEWABLE ORAL DAILY PRN
Status: DISCONTINUED | OUTPATIENT
Start: 2017-11-27 | End: 2017-12-01 | Stop reason: HOSPADM

## 2017-11-27 RX ORDER — ONDANSETRON 2 MG/ML
4 INJECTION INTRAMUSCULAR; INTRAVENOUS ONCE AS NEEDED
Status: DISCONTINUED | OUTPATIENT
Start: 2017-11-27 | End: 2017-11-27 | Stop reason: HOSPADM

## 2017-11-27 RX ORDER — BUPIVACAINE HYDROCHLORIDE 2.5 MG/ML
20 INJECTION, SOLUTION EPIDURAL; INFILTRATION; INTRACAUDAL ONCE
Status: DISCONTINUED | OUTPATIENT
Start: 2017-11-27 | End: 2017-11-27 | Stop reason: CLARIF

## 2017-11-27 RX ORDER — MORPHINE SULFATE 2 MG/ML
2 INJECTION, SOLUTION INTRAMUSCULAR; INTRAVENOUS
Status: DISCONTINUED | OUTPATIENT
Start: 2017-11-27 | End: 2017-12-01 | Stop reason: HOSPADM

## 2017-11-27 RX ORDER — ACETAMINOPHEN 325 MG/1
975 TABLET ORAL ONCE
Status: COMPLETED | OUTPATIENT
Start: 2017-11-27 | End: 2017-11-27

## 2017-11-27 RX ORDER — MAGNESIUM HYDROXIDE 1200 MG/15ML
LIQUID ORAL AS NEEDED
Status: DISCONTINUED | OUTPATIENT
Start: 2017-11-27 | End: 2017-11-27 | Stop reason: HOSPADM

## 2017-11-27 RX ORDER — METOCLOPRAMIDE HYDROCHLORIDE 5 MG/ML
5 INJECTION INTRAMUSCULAR; INTRAVENOUS ONCE AS NEEDED
Status: DISCONTINUED | OUTPATIENT
Start: 2017-11-27 | End: 2017-11-27 | Stop reason: HOSPADM

## 2017-11-27 RX ORDER — SODIUM CHLORIDE 9 MG/ML
INJECTION, SOLUTION INTRAVENOUS CONTINUOUS PRN
Status: DISCONTINUED | OUTPATIENT
Start: 2017-11-27 | End: 2017-11-27 | Stop reason: SURG

## 2017-11-27 RX ORDER — SENNOSIDES 8.6 MG
650 CAPSULE ORAL EVERY 8 HOURS PRN
Qty: 30 TABLET | Refills: 0 | Status: SHIPPED | OUTPATIENT
Start: 2017-11-27 | End: 2018-08-29

## 2017-11-27 RX ORDER — PANTOPRAZOLE SODIUM 40 MG/1
40 TABLET, DELAYED RELEASE ORAL
Status: DISCONTINUED | OUTPATIENT
Start: 2017-11-28 | End: 2017-12-01 | Stop reason: HOSPADM

## 2017-11-27 RX ORDER — ACETAMINOPHEN 325 MG/1
650 TABLET ORAL EVERY 6 HOURS PRN
Status: DISCONTINUED | OUTPATIENT
Start: 2017-11-27 | End: 2017-12-01 | Stop reason: HOSPADM

## 2017-11-27 RX ORDER — TRAMADOL HYDROCHLORIDE 50 MG/1
50 TABLET ORAL EVERY 6 HOURS SCHEDULED
Status: DISCONTINUED | OUTPATIENT
Start: 2017-11-27 | End: 2017-12-01 | Stop reason: HOSPADM

## 2017-11-27 RX ORDER — ONDANSETRON 2 MG/ML
INJECTION INTRAMUSCULAR; INTRAVENOUS AS NEEDED
Status: DISCONTINUED | OUTPATIENT
Start: 2017-11-27 | End: 2017-11-27 | Stop reason: SURG

## 2017-11-27 RX ORDER — ALBUMIN, HUMAN INJ 5% 5 %
SOLUTION INTRAVENOUS CONTINUOUS PRN
Status: DISCONTINUED | OUTPATIENT
Start: 2017-11-27 | End: 2017-11-27 | Stop reason: SURG

## 2017-11-27 RX ORDER — SODIUM CHLORIDE, SODIUM LACTATE, POTASSIUM CHLORIDE, CALCIUM CHLORIDE 600; 310; 30; 20 MG/100ML; MG/100ML; MG/100ML; MG/100ML
50 INJECTION, SOLUTION INTRAVENOUS CONTINUOUS
Status: DISCONTINUED | OUTPATIENT
Start: 2017-11-27 | End: 2017-12-01

## 2017-11-27 RX ORDER — GLYCOPYRROLATE 0.2 MG/ML
INJECTION INTRAMUSCULAR; INTRAVENOUS AS NEEDED
Status: DISCONTINUED | OUTPATIENT
Start: 2017-11-27 | End: 2017-11-27 | Stop reason: SURG

## 2017-11-27 RX ORDER — ROCURONIUM BROMIDE 10 MG/ML
INJECTION, SOLUTION INTRAVENOUS AS NEEDED
Status: DISCONTINUED | OUTPATIENT
Start: 2017-11-27 | End: 2017-11-27 | Stop reason: SURG

## 2017-11-27 RX ORDER — LIDOCAINE HYDROCHLORIDE 10 MG/ML
5 INJECTION, SOLUTION EPIDURAL; INFILTRATION; INTRACAUDAL; PERINEURAL ONCE
Status: DISCONTINUED | OUTPATIENT
Start: 2017-11-27 | End: 2017-11-27 | Stop reason: CLARIF

## 2017-11-27 RX ORDER — METHYLPREDNISOLONE ACETATE 80 MG/ML
80 INJECTION, SUSPENSION INTRA-ARTICULAR; INTRALESIONAL; INTRAMUSCULAR; SOFT TISSUE ONCE
Status: DISCONTINUED | OUTPATIENT
Start: 2017-11-27 | End: 2017-11-27 | Stop reason: CLARIF

## 2017-11-27 RX ORDER — DOCUSATE SODIUM 100 MG/1
100 CAPSULE, LIQUID FILLED ORAL 2 TIMES DAILY
Qty: 10 CAPSULE | Refills: 0 | Status: SHIPPED | OUTPATIENT
Start: 2017-11-27 | End: 2020-04-27 | Stop reason: ALTCHOICE

## 2017-11-27 RX ORDER — SODIUM CHLORIDE, SODIUM LACTATE, POTASSIUM CHLORIDE, CALCIUM CHLORIDE 600; 310; 30; 20 MG/100ML; MG/100ML; MG/100ML; MG/100ML
20 INJECTION, SOLUTION INTRAVENOUS CONTINUOUS
Status: DISCONTINUED | OUTPATIENT
Start: 2017-11-27 | End: 2017-12-01

## 2017-11-27 RX ORDER — KETAMINE HYDROCHLORIDE 50 MG/ML
INJECTION, SOLUTION, CONCENTRATE INTRAMUSCULAR; INTRAVENOUS AS NEEDED
Status: DISCONTINUED | OUTPATIENT
Start: 2017-11-27 | End: 2017-11-27 | Stop reason: SURG

## 2017-11-27 RX ORDER — TRAMADOL HYDROCHLORIDE 50 MG/1
50 TABLET ORAL EVERY 6 HOURS PRN
Qty: 60 TABLET | Refills: 0 | Status: SHIPPED | OUTPATIENT
Start: 2017-11-27 | End: 2017-12-07

## 2017-11-27 RX ORDER — PROPOFOL 10 MG/ML
INJECTION, EMULSION INTRAVENOUS AS NEEDED
Status: DISCONTINUED | OUTPATIENT
Start: 2017-11-27 | End: 2017-11-27 | Stop reason: SURG

## 2017-11-27 RX ORDER — GABAPENTIN 300 MG/1
300 CAPSULE ORAL ONCE
Status: COMPLETED | OUTPATIENT
Start: 2017-11-27 | End: 2017-11-27

## 2017-11-27 RX ORDER — LEVOTHYROXINE SODIUM 0.03 MG/1
25 TABLET ORAL
Status: DISCONTINUED | OUTPATIENT
Start: 2017-11-28 | End: 2017-12-01 | Stop reason: HOSPADM

## 2017-11-27 RX ORDER — ONDANSETRON 2 MG/ML
4 INJECTION INTRAMUSCULAR; INTRAVENOUS EVERY 6 HOURS PRN
Status: DISCONTINUED | OUTPATIENT
Start: 2017-11-27 | End: 2017-12-01 | Stop reason: HOSPADM

## 2017-11-27 RX ORDER — FENTANYL CITRATE 50 UG/ML
INJECTION, SOLUTION INTRAMUSCULAR; INTRAVENOUS AS NEEDED
Status: DISCONTINUED | OUTPATIENT
Start: 2017-11-27 | End: 2017-11-27 | Stop reason: SURG

## 2017-11-27 RX ORDER — OXYCODONE HYDROCHLORIDE 5 MG/1
5 TABLET ORAL EVERY 4 HOURS PRN
Status: DISCONTINUED | OUTPATIENT
Start: 2017-11-27 | End: 2017-12-01 | Stop reason: HOSPADM

## 2017-11-27 RX ORDER — OXYCODONE HYDROCHLORIDE 10 MG/1
10 TABLET ORAL EVERY 4 HOURS PRN
Status: DISCONTINUED | OUTPATIENT
Start: 2017-11-27 | End: 2017-12-01 | Stop reason: HOSPADM

## 2017-11-27 RX ORDER — DIAZEPAM 2 MG/1
5 TABLET ORAL
Status: DISCONTINUED | OUTPATIENT
Start: 2017-11-27 | End: 2017-12-01 | Stop reason: HOSPADM

## 2017-11-27 RX ORDER — OXYCODONE HYDROCHLORIDE 5 MG/1
TABLET ORAL
Qty: 60 TABLET | Refills: 0 | Status: SHIPPED | OUTPATIENT
Start: 2017-11-27 | End: 2018-05-16

## 2017-11-27 RX ADMIN — ALBUMIN HUMAN: 0.05 INJECTION, SOLUTION INTRAVENOUS at 14:45

## 2017-11-27 RX ADMIN — TRANEXAMIC ACID 1000 MG: 100 INJECTION, SOLUTION INTRAVENOUS at 12:55

## 2017-11-27 RX ADMIN — GLYCOPYRROLATE 0.4 MG: 0.2 INJECTION, SOLUTION INTRAMUSCULAR; INTRAVENOUS at 16:01

## 2017-11-27 RX ADMIN — FENTANYL CITRATE 50 MCG: 50 INJECTION INTRAMUSCULAR; INTRAVENOUS at 17:30

## 2017-11-27 RX ADMIN — CEFAZOLIN SODIUM 1000 MG: 1 SOLUTION INTRAVENOUS at 13:15

## 2017-11-27 RX ADMIN — NORTRIPTYLINE HYDROCHLORIDE 35 MG: 25 CAPSULE ORAL at 22:31

## 2017-11-27 RX ADMIN — MIDAZOLAM HYDROCHLORIDE 2 MG: 1 INJECTION, SOLUTION INTRAMUSCULAR; INTRAVENOUS at 12:41

## 2017-11-27 RX ADMIN — HYDROMORPHONE HYDROCHLORIDE 0.5 MG: 1 INJECTION, SOLUTION INTRAMUSCULAR; INTRAVENOUS; SUBCUTANEOUS at 14:25

## 2017-11-27 RX ADMIN — MORPHINE SULFATE 2 MG: 2 INJECTION, SOLUTION INTRAMUSCULAR; INTRAVENOUS at 21:23

## 2017-11-27 RX ADMIN — DEXAMETHASONE SODIUM PHOSPHATE 10 MG: 10 INJECTION INTRAMUSCULAR; INTRAVENOUS at 13:25

## 2017-11-27 RX ADMIN — EPHEDRINE SULFATE 10 MG: 50 INJECTION, SOLUTION INTRAMUSCULAR; INTRAVENOUS; SUBCUTANEOUS at 13:50

## 2017-11-27 RX ADMIN — SODIUM CHLORIDE, SODIUM LACTATE, POTASSIUM CHLORIDE, AND CALCIUM CHLORIDE: .6; .31; .03; .02 INJECTION, SOLUTION INTRAVENOUS at 13:30

## 2017-11-27 RX ADMIN — SODIUM CHLORIDE, SODIUM LACTATE, POTASSIUM CHLORIDE, AND CALCIUM CHLORIDE: .6; .31; .03; .02 INJECTION, SOLUTION INTRAVENOUS at 16:22

## 2017-11-27 RX ADMIN — HYDROMORPHONE HYDROCHLORIDE 0.5 MG: 1 INJECTION, SOLUTION INTRAMUSCULAR; INTRAVENOUS; SUBCUTANEOUS at 17:46

## 2017-11-27 RX ADMIN — NEOSTIGMINE METHYLSULFATE 3 MG: 1 INJECTION, SOLUTION INTRAMUSCULAR; INTRAVENOUS; SUBCUTANEOUS at 16:01

## 2017-11-27 RX ADMIN — SODIUM CHLORIDE: 0.9 INJECTION, SOLUTION INTRAVENOUS at 12:54

## 2017-11-27 RX ADMIN — CEFAZOLIN SODIUM 1000 MG: 1 SOLUTION INTRAVENOUS at 21:15

## 2017-11-27 RX ADMIN — FENTANYL CITRATE 50 MCG: 50 INJECTION, SOLUTION INTRAMUSCULAR; INTRAVENOUS at 13:15

## 2017-11-27 RX ADMIN — ONDANSETRON 4 MG: 2 INJECTION INTRAMUSCULAR; INTRAVENOUS at 15:47

## 2017-11-27 RX ADMIN — ROCURONIUM BROMIDE 50 MG: 10 INJECTION INTRAVENOUS at 12:50

## 2017-11-27 RX ADMIN — FENTANYL CITRATE 50 MCG: 50 INJECTION INTRAMUSCULAR; INTRAVENOUS at 17:17

## 2017-11-27 RX ADMIN — GABAPENTIN 300 MG: 300 CAPSULE ORAL at 10:41

## 2017-11-27 RX ADMIN — SODIUM CHLORIDE, SODIUM LACTATE, POTASSIUM CHLORIDE, AND CALCIUM CHLORIDE 20 ML/HR: .6; .31; .03; .02 INJECTION, SOLUTION INTRAVENOUS at 10:39

## 2017-11-27 RX ADMIN — ALBUMIN HUMAN: 0.05 INJECTION, SOLUTION INTRAVENOUS at 15:29

## 2017-11-27 RX ADMIN — FENTANYL CITRATE 50 MCG: 50 INJECTION, SOLUTION INTRAMUSCULAR; INTRAVENOUS at 12:55

## 2017-11-27 RX ADMIN — PROPOFOL 200 MG: 10 INJECTION, EMULSION INTRAVENOUS at 12:49

## 2017-11-27 RX ADMIN — GABAPENTIN 100 MG: 100 CAPSULE ORAL at 21:15

## 2017-11-27 RX ADMIN — TRAMADOL HYDROCHLORIDE 50 MG: 50 TABLET, FILM COATED ORAL at 18:24

## 2017-11-27 RX ADMIN — ACETAMINOPHEN 975 MG: 325 TABLET, FILM COATED ORAL at 10:42

## 2017-11-27 RX ADMIN — KETAMINE HYDROCHLORIDE 10 MG: 50 INJECTION, SOLUTION INTRAMUSCULAR; INTRAVENOUS at 14:37

## 2017-11-27 RX ADMIN — OXYCODONE HYDROCHLORIDE 10 MG: 10 TABLET ORAL at 18:24

## 2017-11-27 RX ADMIN — SODIUM CHLORIDE, SODIUM LACTATE, POTASSIUM CHLORIDE, AND CALCIUM CHLORIDE 1.5 ML/KG/HR: .6; .31; .03; .02 INJECTION, SOLUTION INTRAVENOUS at 17:43

## 2017-11-27 RX ADMIN — LIDOCAINE HYDROCHLORIDE 80 MG: 20 INJECTION, SOLUTION EPIDURAL; INFILTRATION; INTRACAUDAL; PERINEURAL at 12:48

## 2017-11-27 RX ADMIN — PHENYLEPHRINE HYDROCHLORIDE 40 MCG/MIN: 10 INJECTION INTRAVENOUS at 14:03

## 2017-11-27 NOTE — PERIOPERATIVE NURSING NOTE
Portable Xray done at bedsid eof left hip  Condition stable post op  VSS   Pt ready for D/C from the PACU

## 2017-11-27 NOTE — ANESTHESIA PREPROCEDURE EVALUATION
Review of Systems/Medical History  Patient summary reviewed  Chart reviewed      Cardiovascular  Exercise tolerance: good,  Dysrhythmias, ,    Pulmonary       GI/Hepatic    GERD well controlled,             Endo/Other  History of thyroid disease , hypothyroidism, Arthritis     GYN       Hematology   Musculoskeletal       Neurology   Psychology           Physical Exam    Airway    Mallampati score: I  TM Distance: >3 FB  Neck ROM: full     Dental       Cardiovascular      Pulmonary      Other Findings        Anesthesia Plan  ASA Score- 3       Anesthesia Type- general with ASA Monitors  Additional Monitors:   Airway Plan: ETT and LMA  Induction- intravenous  Informed Consent- Anesthetic plan and risks discussed with patient  I personally reviewed this patient with the CRNA  Discussed and agreed on the Anesthesia Plan with the CRNA  Tierney Hartman

## 2017-11-27 NOTE — DISCHARGE INSTRUCTIONS
Discharge Instructions - Orthopedics  Shahrzad Herrera Colon 67 y o  female MRN: [id number]  Unit/Bed#: PACU 02    Weight Bearing Status:                                           Weight Bearing as tolerated to left leg  Be sure to maintain Hip Precautions (no bending at waist, no crossing legs, on internally rotating surgical leg)    DVT prophylaxis:  Complete course of Lovenox as directed    Pain:  Continue analgesics as directed    Showering Instructions:   Do not shower until followup     Dressing Instructions:   Keep dressing clean, dry and intact until follow up appointment  Driving Instructions:  No driving until cleared by Orthopaedic Surgery  PT/OT:  Continue PT/OT on outpatient basis as directed    Appt Instructions: If you do not have your appointment, please call the clinic at [phone]  Otherwise followup as scheduled below:      Contact the office sooner if you experience any increased numbness/tingling in the extremities        Miscellaneous:  None

## 2017-11-27 NOTE — ANESTHESIA POSTPROCEDURE EVALUATION
Post-Op Assessment Note      CV Status:  Stable    Mental Status:  Somnolent and alert    Hydration Status:  Euvolemic    PONV Controlled:  Controlled    Airway Patency:  Patent    Post Op Vitals Reviewed: Yes          Staff: Anesthesiologist, CRNA           /65 (11/27/17 1647)    Temp 97 8 °F (36 6 °C) (11/27/17 1647)    Pulse 69 (11/27/17 1647)   Resp 17 (11/27/17 1647)    SpO2 100 % (11/27/17 1647)

## 2017-11-28 LAB
ANION GAP SERPL CALCULATED.3IONS-SCNC: 4 MMOL/L (ref 4–13)
BUN SERPL-MCNC: 15 MG/DL (ref 5–25)
CALCIUM SERPL-MCNC: 7.7 MG/DL (ref 8.3–10.1)
CHLORIDE SERPL-SCNC: 103 MMOL/L (ref 100–108)
CO2 SERPL-SCNC: 29 MMOL/L (ref 21–32)
CREAT SERPL-MCNC: 0.49 MG/DL (ref 0.6–1.3)
ERYTHROCYTE [DISTWIDTH] IN BLOOD BY AUTOMATED COUNT: 14 % (ref 11.6–15.1)
GFR SERPL CREATININE-BSD FRML MDRD: 97 ML/MIN/1.73SQ M
GLUCOSE SERPL-MCNC: 108 MG/DL (ref 65–140)
HCT VFR BLD AUTO: 26.3 % (ref 34.8–46.1)
HGB BLD-MCNC: 8.2 G/DL (ref 11.5–15.4)
MCH RBC QN AUTO: 27.2 PG (ref 26.8–34.3)
MCHC RBC AUTO-ENTMCNC: 31.2 G/DL (ref 31.4–37.4)
MCV RBC AUTO: 87 FL (ref 82–98)
PLATELET # BLD AUTO: 222 THOUSANDS/UL (ref 149–390)
PMV BLD AUTO: 8.7 FL (ref 8.9–12.7)
POTASSIUM SERPL-SCNC: 4.2 MMOL/L (ref 3.5–5.3)
RBC # BLD AUTO: 3.01 MILLION/UL (ref 3.81–5.12)
SODIUM SERPL-SCNC: 136 MMOL/L (ref 136–145)
WBC # BLD AUTO: 8.42 THOUSAND/UL (ref 4.31–10.16)

## 2017-11-28 PROCEDURE — 80048 BASIC METABOLIC PNL TOTAL CA: CPT | Performed by: PHYSICIAN ASSISTANT

## 2017-11-28 PROCEDURE — 85027 COMPLETE CBC AUTOMATED: CPT | Performed by: PHYSICIAN ASSISTANT

## 2017-11-28 PROCEDURE — 97530 THERAPEUTIC ACTIVITIES: CPT

## 2017-11-28 PROCEDURE — 97110 THERAPEUTIC EXERCISES: CPT

## 2017-11-28 PROCEDURE — 30233N1 TRANSFUSION OF NONAUTOLOGOUS RED BLOOD CELLS INTO PERIPHERAL VEIN, PERCUTANEOUS APPROACH: ICD-10-PCS | Performed by: ORTHOPAEDIC SURGERY

## 2017-11-28 PROCEDURE — P9021 RED BLOOD CELLS UNIT: HCPCS

## 2017-11-28 RX ORDER — SODIUM CHLORIDE 9 MG/ML
50 INJECTION, SOLUTION INTRAVENOUS ONCE
Status: DISCONTINUED | OUTPATIENT
Start: 2017-11-28 | End: 2017-11-28

## 2017-11-28 RX ORDER — ACETAMINOPHEN 325 MG/1
650 TABLET ORAL ONCE
Status: COMPLETED | OUTPATIENT
Start: 2017-11-28 | End: 2017-11-28

## 2017-11-28 RX ORDER — SODIUM CHLORIDE 9 MG/ML
75 INJECTION, SOLUTION INTRAVENOUS ONCE
Status: COMPLETED | OUTPATIENT
Start: 2017-11-28 | End: 2017-11-30

## 2017-11-28 RX ADMIN — ENOXAPARIN SODIUM 40 MG: 40 INJECTION SUBCUTANEOUS at 08:56

## 2017-11-28 RX ADMIN — TRAMADOL HYDROCHLORIDE 50 MG: 50 TABLET, FILM COATED ORAL at 05:54

## 2017-11-28 RX ADMIN — LEVOTHYROXINE SODIUM 25 MCG: 25 TABLET ORAL at 05:55

## 2017-11-28 RX ADMIN — TRAMADOL HYDROCHLORIDE 50 MG: 50 TABLET, FILM COATED ORAL at 23:15

## 2017-11-28 RX ADMIN — OXYCODONE HYDROCHLORIDE 10 MG: 10 TABLET ORAL at 05:57

## 2017-11-28 RX ADMIN — OXYCODONE HYDROCHLORIDE 10 MG: 10 TABLET ORAL at 21:19

## 2017-11-28 RX ADMIN — DIAZEPAM 5 MG: 5 TABLET ORAL at 23:15

## 2017-11-28 RX ADMIN — TRAMADOL HYDROCHLORIDE 50 MG: 50 TABLET, FILM COATED ORAL at 12:45

## 2017-11-28 RX ADMIN — SODIUM CHLORIDE 75 ML/HR: 0.9 INJECTION, SOLUTION INTRAVENOUS at 10:05

## 2017-11-28 RX ADMIN — MORPHINE SULFATE 2 MG: 2 INJECTION, SOLUTION INTRAMUSCULAR; INTRAVENOUS at 08:57

## 2017-11-28 RX ADMIN — GABAPENTIN 100 MG: 100 CAPSULE ORAL at 21:19

## 2017-11-28 RX ADMIN — TRAMADOL HYDROCHLORIDE 50 MG: 50 TABLET, FILM COATED ORAL at 18:17

## 2017-11-28 RX ADMIN — CEFAZOLIN SODIUM 1000 MG: 1 SOLUTION INTRAVENOUS at 04:24

## 2017-11-28 RX ADMIN — PANTOPRAZOLE SODIUM 40 MG: 40 TABLET, DELAYED RELEASE ORAL at 05:55

## 2017-11-28 RX ADMIN — OXYCODONE HYDROCHLORIDE 10 MG: 10 TABLET ORAL at 14:20

## 2017-11-28 RX ADMIN — ACETAMINOPHEN 650 MG: 325 TABLET, FILM COATED ORAL at 14:13

## 2017-11-28 RX ADMIN — OXYCODONE HYDROCHLORIDE 10 MG: 10 TABLET ORAL at 00:55

## 2017-11-28 RX ADMIN — TRAMADOL HYDROCHLORIDE 50 MG: 50 TABLET, FILM COATED ORAL at 00:45

## 2017-11-28 RX ADMIN — GABAPENTIN 100 MG: 100 CAPSULE ORAL at 08:57

## 2017-11-28 RX ADMIN — NORTRIPTYLINE HYDROCHLORIDE 35 MG: 25 CAPSULE ORAL at 21:19

## 2017-11-28 RX ADMIN — GABAPENTIN 100 MG: 100 CAPSULE ORAL at 16:24

## 2017-11-28 NOTE — PROGRESS NOTES
Orthopedics   Geetha Colon 67 y o  female MRN: [id number]  Unit/Bed#: CW3 343-01      Subjective:  67 y  o female post operative day 1 left total hip arthroplasty  Pt doing well  Pain controlled      Labs:    0  Lab Value Date/Time   HCT 26 3 (L) 11/28/2017 0511   HCT 38 1 11/06/2017 0638   HCT 37 5 09/01/2017 0701   HCT 36 8 12/16/2014 0727   HCT 28 8 (L) 10/30/2014 0626   HCT 29 2 (L) 10/27/2014 0648   HGB 8 2 (L) 11/28/2017 0511   HGB 12 0 11/06/2017 0638   HGB 11 9 09/01/2017 0701   HGB 11 9 12/16/2014 0727   HGB 8 8 (L) 10/30/2014 0626   HGB 9 0 (L) 10/27/2014 0648   INR 0 95 11/09/2017 1217   INR 0 96 10/13/2014 0608   WBC 8 42 11/28/2017 0511   WBC 6 58 11/06/2017 0638   WBC 5 17 09/01/2017 0701   WBC 5 60 12/16/2014 0727   WBC 5 58 10/30/2014 0626   WBC 6 95 10/27/2014 0648       Meds:    Current Facility-Administered Medications:     acetaminophen (TYLENOL) tablet 650 mg, 650 mg, Oral, Q6H PRN, Vida Jose PA-C    calcium carbonate (TUMS) chewable tablet 1,000 mg, 1,000 mg, Oral, Daily PRN, Vida Jose PA-C    diazepam (VALIUM) tablet 5 mg, 5 mg, Oral, HS PRN, Vida Jose PA-C    enoxaparin (LOVENOX) subcutaneous injection 40 mg, 40 mg, Subcutaneous, Daily, Vida Jose PA-C    gabapentin (NEURONTIN) capsule 100 mg, 100 mg, Oral, TID, Vida Jose PA-C, 100 mg at 11/27/17 2115    lactated ringers infusion, 20 mL/hr, Intravenous, Continuous, La Galvan MD, Last Rate: 20 mL/hr at 11/27/17 1039    lactated ringers infusion, 50 mL/hr, Intravenous, Continuous, Donavan Collet, CRNA, Stopped at 11/27/17 1740    lactated ringers infusion, 1 5 mL/kg/hr, Intravenous, Continuous, Vida Jose PA-C, Last Rate: 70 1 mL/hr at 11/27/17 1743, 1 5 mL/kg/hr at 11/27/17 1743    levothyroxine tablet 25 mcg, 25 mcg, Oral, Early Morning, Vida Jose PA-C, 25 mcg at 11/28/17 0555    morphine injection 2 mg, 2 mg, Intravenous, Q3H PRN, Vida Jose PA-C, 2 mg at 11/27/17 2123    nortriptyline (PAMELOR) capsule 35 mg, 35 mg, Oral, HS, Wilburt Evangelista, PA-C, 35 mg at 11/27/17 2231    ondansetron (ZOFRAN) injection 4 mg, 4 mg, Intravenous, Q6H PRN, Wilburt Evangelista, PA-C    oxyCODONE (ROXICODONE) immediate release tablet 10 mg, 10 mg, Oral, Q4H PRN, Wilburt Evangelista, PA-C, 10 mg at 11/28/17 0557    oxyCODONE (ROXICODONE) IR tablet 5 mg, 5 mg, Oral, Q4H PRN, Wilburt Evangelista, PA-C    pantoprazole (PROTONIX) EC tablet 40 mg, 40 mg, Oral, Early Morning, Wilburt Evangelista, PA-C, 40 mg at 11/28/17 0555    traMADol (ULTRAM) tablet 50 mg, 50 mg, Oral, Q6H [address], Wilburt Evangelista, PA-C, 50 mg at 11/28/17 0554    Blood Culture:   No results found for: BLOODCX    Wound Culture:   No results found for: WOUNDCULT    Ins and Outs:  I/O last 24 hours: In: 4555 9 [P O :486; I V :3569 9; IV Piggyback:500]  Out: 1722 [Urine:1400; Blood:350]          Physical Exam:  Vitals:    11/28/17 0333   BP: 97/54   Pulse: 81   Resp: 18   Temp: (!) 97 4 °F (36 3 °C)   SpO2: 100%     Left lower extremity:  · Dressings C/D/I  · Sensation intact L1/S1  · Motor intact L3-S1  · 2+ dorsalis pedis     _*_*_*_*_*_*_*_*_*_*_*_*_*_*_*_*_*_*_*_*_*_*_*_*_*_*_*_*_*_*_*_*_*_*_*_*_*_*_*_*_*    Assessment: 67 y  o female post operative day 1 left total hip arthroplasty   Doing well    Plan:  · Weight Bearing as tolerated  · Up and out of bed  · Total hip precautions  · DVT prophylaxis  · Analgesics  · PT/OT  · Will continue to assess for acute blood loss anemia    Monse Hobson MD

## 2017-11-28 NOTE — PLAN OF CARE
Discharge to home or other facility with appropriate resources Progressing      Maintains hematologic stability Progressing      Absence or prevention of progression during hospitalization Progressing      Patient/family/caregiver demonstrates understanding of disease process, treatment plan, medications, and discharge instructions Progressing      Electrolytes maintained within normal limits Progressing      Fluid balance maintained Progressing      Glucose maintained within target range Progressing      Maintain or return mobility to safest level of function Progressing      Maintain proper alignment of affected body part Progressing      Verbalizes/displays adequate comfort level or baseline comfort level Progressing      Patient will remain free of falls Progressing      Maintain or return to baseline ADL function Progressing      Maintain or return mobility status to optimal level Progressing      Skin integrity remains intact Progressing      Incision(s), wounds(s) or drain site(s) healing without S/S of infection Progressing      Oral mucous membranes remain intact Progressing

## 2017-11-28 NOTE — PROGRESS NOTES
Pt care rounds completed with Dr Lynne Duenas pt to receive blood today will recheck cbc in am as ordered by internal med

## 2017-11-28 NOTE — PHYSICAL THERAPY NOTE
Physical Therapy Progress Note     11/28/17 0896   Pain Assessment   Pain Assessment 0-10   Pain Score Worst Possible Pain   Pain Type Acute pain;Surgical pain   Pain Location Hip   Pain Orientation Left   Hospital Pain Intervention(s) Cold applied;Repositioned   Response to Interventions unchanged    Precautions   Total Hip Replacement ADduction; Internal rotation; Flexion   Restrictions/Precautions   Weight Bearing Precautions Per Order Yes   LLE Weight Bearing Per Order WBAT   Other Precautions THR;WBS;Pain   General   Chart Reviewed Yes   Family/Caregiver Present No   Cognition   Overall Cognitive Status WFL   Arousal/Participation Alert; Cooperative   Orientation Level Oriented X4   Following Commands Follows one step commands without difficulty   Subjective   Subjective states that  she has much op pain     reluctant to move    Exercises   Quad Sets Supine;15 reps;AROM; Bilateral   Heelslides Supine;15 reps;AAROM; Bilateral   Glute Sets Supine;15 reps;AROM; Bilateral   Hip Abduction Supine;15 reps;AAROM; Bilateral   Hip Adduction Supine;15 reps;AAROM; Bilateral   Knee AROM Short Arc Quad Sitting;15 reps;AAROM; Bilateral   Ankle Pumps Supine;20 reps;AROM; Bilateral   Assessment   Prognosis Good   Problem List Decreased strength;Decreased range of motion;Decreased endurance;Decreased mobility;Orthopedic restrictions;Pain   Assessment pt  does not recall  THP's  reviewed same c her      pt presently recieving  blood trnasfusion  therefore bed level therex only    pt   complete   therex aarom  BLE    she  remains  very painful and  reluctnat to move    pt  was instructed   on need for  therex and mobility        pt  session  ice pack reapplied to  rt hip     will cont  to see    Barriers to Discharge Inaccessible home environment   Goals   Patient Goals have less pain    STG Expiration Date 12/03/17   Treatment Day 2   Plan   Treatment/Interventions LE strengthening/ROM; Patient/family training;Spoke to nursing   Progress Slow progress, medical status limitations   PT Frequency 7x/wk; Twice a day   Recommendation   Recommendation (TBD  pending progress)   PT - OK to Discharge No     Nano Simpler, PTA

## 2017-11-28 NOTE — PHYSICAL THERAPY NOTE
PT PROGRESS       11/27/17 1851   Goals   STG Expiration Date 12/03/17   Short Term Goal #1 Pt will be Chase with bed mobility  Pt will be Chase with supine<->sit  Pt will be modA with sit<->stand  Pt will stand >3 minutes with CGA        Orlena Comfort, PT

## 2017-11-28 NOTE — SOCIAL WORK
Pt new admit to the floor  CM met with patient and explained cm role  Pt alert and oriented  Pt reports that she lives in a 2 story home w/a 1st floor setup  Pt reports being independent PTA, reports good support at home, she drives and has transport w/brother Mine Garg for d/c  Pt reports DME: rw, shower chair, and commode, previous VNA, not sure of agency, denies rehab  Pts pharmacy is [facility] on [address] [facility]  Pt denies hx/admission for drug/etoh and psych/mental health  Pt states she filled her Lovenox at her pharmacy, and will pick it up when she is d/c      CM reviewed d/c planning process including the following: identifying help at home, patient preference for d/c planning needs, Discharge Lounge, [facility] Meds to Bed program, availability of treatment team to discuss questions or concerns patient and/or family may have regarding understanding medications and recognizing signs and symptoms once discharged  CM also encouraged patient to follow up with all recommended appointments after discharge  Patient advised of importance for patient and family to participate in managing patients medical well being

## 2017-11-28 NOTE — CASE MANAGEMENT
Initial Clinical Review    Age/Sex: 67 y o  female admitted on 11/27 for elective surgery - OR    Surgery Date: 11/27    Procedure: S/P REMOVAL IM NAIL CONVERSION TO TOTAL HIP ARTHROPLASTY POSTERIOR APPROACH (Left Hip)     Anesthesia: General    Admission Orders: Date/Time/Statement: [id number]/27/17 @ 1653 Med Surg    Orders Placed This Encounter   Procedures    Inpatient Admission     Standing Status:   Standing     Number of Occurrences:   1     Order Specific Question:   Admitting Physician     Answer:   Mert Mohan [[id number]]     Order Specific Question:   Level of Care     Answer:   Med Surg [[id number]]     Order Specific Question:   Estimated length of stay     Answer:   More than 2 Midnights     Order Specific Question:   Certification     Answer:   I certify that inpatient services are medically necessary for this patient for a duration of greater than two midnights  See H&P and MD Progress Notes for additional information about the patient's course of treatment  Vital Signs: BP 99/55   Pulse 81   Temp 98 1 °F (36 7 °C) (Oral)   Resp 16   Ht 5' 2" (1 575 m)   Wt 57 2 kg (126 lb 1 oz)   SpO2 100%   BMI 23 06 kg/m²     Diet:        Diet Orders            Start     Ordered    11/27/17 1801  Diet Regular; Regular House  Diet effective now     Question Answer Comment   Diet Type Regular    Regular Regular House    RD to adjust diet per protocol?  Yes        11/27/17 1800          Mobility: Activity as tolerated  PT/OT eval and treat    DVT Prophylaxis: Sequential compression device    Scheduled Meds:  Cefazolin  Intravenous x3   acetaminophen 650 mg Oral Once   enoxaparin 40 mg Subcutaneous Daily   gabapentin 100 mg Oral TID   levothyroxine 25 mcg Oral Early Morning   nortriptyline 35 mg Oral HS   pantoprazole 40 mg Oral Early Morning   traMADol 50 mg Oral Q6H [address]     Continuous Infusions:  lactated ringers 1 5 mL/kg/hr Last Rate: Stopped (11/28/17 7271)     PRN Meds:    acetaminophen    calcium carbonate    diazepam    morphine injection Iv x2    ondansetron    oxyCODONE po x3

## 2017-11-28 NOTE — PHYSICAL THERAPY NOTE
Physical Therapy Evaluation    Patient's Name: Oseas Rivera    Admitting Diagnosis  Primary osteoarthritis of left hip [M16 12]    Problem List  Patient Active Problem List   Diagnosis    Dizziness    Anxiety    PVD [facility]peripheral vascular disease) ([facility] [address] )    Urinary tract infection    GERD (gastroesophageal reflux disease)    Epigastric discomfort    RLS (restless legs syndrome)    Arthritis of left hip    S/P total hip arthroplasty       Past Medical History  Past Medical History:   Diagnosis Date    Anxiety     Disease of thyroid gland     HYPO    Femur neck fracture (HCC)     GERD (gastroesophageal reflux disease)     Osteoporosis     Polio     PVD (peripheral vascular disease) ([facility] [address] )     Right BBB/left ant fasc block     UTI (urinary tract infection)     Varicella infection        Past Surgical History  Past Surgical History:   Procedure Laterality Date    APPENDECTOMY      HIP SURGERY      both hips replaced;2013 left ,2014 right    JOINT REPLACEMENT      HIP TOTAL    MS CONV PREV HIP SURG TO TOT HIP ARTHROPLAS Left 10/4/2017    Procedure: Lanis Slain removal of left hip;  Surgeon: Alfredo Vargas MD;  Location: [facility];  Service: Orthopedics    REVISION TOTAL HIP ARTHROPLASTY Right     TONSILLECTOMY        11/27/17 6870   Note Type   Note type Eval/Treat   Pain Assessment   Pain Assessment 0-10   Pain Score 8   Pain Type Acute pain;Surgical pain   Pain Location Hip   Pain Orientation Left   Effect of Pain on Daily Activities impaired tolerance to any mobility    Patient's Stated Pain Goal No pain   Hospital Pain Intervention(s) Cold applied;Repositioned; Ambulation/increased activity   Home Living   Type of [address] Two level;1/2 bath on main level;Stairs to enter with rails  (3 VILMA)   Bathroom Shower/Tub Tub/shower unit   Bathroom Toilet Standard   Bathroom Accessibility Accessible   Home Equipment Walker;Cane   Additional Comments Pt reports using a RW PTA   Prior Function Level of [place] Independent with ADLs and functional mobility   Lives With Alone   Receives Help From Family   ADL Assistance Independent   IADLs Independent   Falls in the last 6 months 0  (pt reports 0; chart reports falls)   Vocational Retired   Comments Pt lives alone  Her sister will be assisting with needs at d/c  Restrictions/Precautions   Weight Bearing Precautions Per Order Yes   RLE Weight Bearing Per Order WBAT   Braces or Orthoses Other (Comment)  (ABD pillow)   Other Precautions Chair Alarm; Bed Alarm;WBS;THR;Fall Risk;Pain   General   Additional Pertinent History 11/27/17 REMOVAL IM NAIL CONVERSION TO TOTAL HIP ARTHROPLASTY POSTERIOR APPROACH    Family/Caregiver Present No   Cognition   Overall Cognitive Status WFL   Arousal/Participation Lethargic   Orientation Level Oriented X4   Memory Decreased recall of recent events   Following Commands Follows one step commands with increased time or repetition   Comments Pt lethargic but pleasant and agreeable to particiapte in PT session  Pt rquestioning, " Am I done?"      RUE Assessment   RUE Assessment WFL   LUE Assessment   LUE Assessment WFL   RLE Assessment   RLE Assessment (3-/5; increased tone and rigidity )   LLE Assessment   LLE Assessment (2-/5;increased tone and rigidity )   Coordination   Movements are Fluid and Coordinated 0   Coordination and Movement Description antalgic    Sensation X   Light Touch   RLE Light Touch Grossly intact   LLE Light Touch Grossly intact   Proprioception   RLE Proprioception Impaired   LLE Proprioception Impaired   Bed Mobility   Rolling R 2  Maximal assistance   Additional items Assist x 1   Rolling L 2  Maximal assistance   Additional items Assist x 1   Supine to Sit 2  Maximal assistance   Additional items Assist x 1   Sit to Supine 2  Maximal assistance   Additional items Assist x 1   Additional Comments pt limited by pain; reported dizziness sitting EOB: BP prior to mobility reading 118/62 post sitting EOB reading 133/65   Transfers   Sit to Stand 2  Maximal assistance   Additional items Assist x 1   Stand to Sit 2  Maximal assistance   Additional items Assist x 1   Sit pivot 2  Maximal assistance   Additional items Assist x 1   Balance   Static Sitting Fair +   Dynamic Sitting Fair   Static Standing Poor -   Endurance Deficit   Endurance Deficit Yes   Endurance Deficit Description pain, dizziness, fatigue    Activity Tolerance   Activity Tolerance Patient limited by fatigue;Patient limited by pain   Medical Staff Made Aware Will update CM with d/c dispo   Nurse Made Aware appropriate to see per Jean Munguia RN    Assessment   Prognosis Good   Problem List Decreased strength;Decreased range of motion;Decreased endurance; Impaired balance;Decreased mobility; Decreased coordination; Impaired tone;Decreased skin integrity;Orthopedic restrictions;Pain   Assessment Pt is a 67year old female presenting s/p REMOVAL IM NAIL CONVERSION TO TOTAL HIP ARTHROPLASTY POSTERIOR APPROACH performed 11/27/17  PT consulted to assess functional mobility and assist with safe d/c planning  PT eval performed POD #0 with WBAT and posterior THP  Pt with a problem list which includes dizziness, anxiety, PVD, UTI, GERD, RLS, and arthritis  PMH includes femur neck fracture, polio and varicella infection  See above for more comprehensive list  PTA, pt living at home alone in a 2 floor home  Pt using a RW  Sister will be available to provide assistance at d/c  On eval, pt presenting with the following deficits: impaired balance, decreased strength and ROM, pain, poor tolerance to activity and decreased overall functional mobility  Pt in supine upon arrival and agreeable to participate in PT session  Pt required max A for bed mobility, transfers and standing  Pt limited by pain, dizziness and lethargy  PT to continue to follow patient  During styay to progress functional mobility (I) and safety  D/C recommendations guarded at this time      Barriers to Discharge Inaccessible home environment   Barriers to Discharge Comments VILMA   Goals   Patient Goals to have less pain    STG Expiration Date 12/04/17   Short Term Goal #1 PT TO SEE   Treatment Day 0   Plan   Treatment/Interventions Continued evaluation   PT Frequency Twice a day;7x/wk   Recommendation   Recommendation Other (Comment)  (TBD pending progress)   Equipment Recommended Other (Comment)  (pt owns a RW and SPC; ? BSC)   PT - OK to Discharge No   Additional Comments Returned to supine with foot pumps and alarm activated   All needs within reach    Barthel Index   Feeding 5   Bathing 0   Grooming Score 5   Dressing Score 5   Bladder Score 0   Bowels Score 10   Toilet Use Score 5   Transfers (Bed/Chair) Score 5   Mobility (Level Surface) Score 0   Stairs Score 0   Barthel Index Score 35           Meredith Antoine, PT

## 2017-11-28 NOTE — CONSULTS
Consultation - Wilner Mitchell Colon 67 y o  female MRN: [id number]    Unit/Bed#: CW3 343-01 Encounter: [id number]        History of Present Illness     HPI: Nikia Contreras is a 67y o  year old female with a history of hypothyroidism, GERD, anixety, osteoporosis presented yesterday under orthopedic service for left total hip arthroplasty  We are asked to follow along for medical management  ROS:  Constitutional: + fatigued    HENT: Negative  Respiratory: Negative  Cardiovascular: Negative  Gastrointestinal: Negative  Musculoskeletal: + left hip pain  Neurological: + lightheaded, dizzy   Psychiatric/Behavioral: Negative  Historical Information   Past Medical History:   Diagnosis Date    Anxiety     Disease of thyroid gland     HYPO    Femur neck fracture (HCC)     GERD (gastroesophageal reflux disease)     Osteoporosis     Polio     PVD (peripheral vascular disease) (HCC)     Right BBB/left ant fasc block     UTI (urinary tract infection)     Varicella infection      Past Surgical History:   Procedure Laterality Date    APPENDECTOMY      HIP SURGERY      both hips replaced;2013 left ,2014 right    JOINT REPLACEMENT      HIP TOTAL    [place] CONV PREV HIP SURG TO TOT HIP Digna Ally Left 10/4/2017    Procedure: Glendia Pong removal of left hip;  Surgeon: Ted Cruz MD;  [facility];  Service: Orthopedics    REVISION TOTAL HIP ARTHROPLASTY Right     TONSILLECTOMY       Social History   History   Alcohol Use No     History   Drug Use No     History   Smoking Status    Former Smoker   Smokeless Tobacco    Never Used     Comment: quit 20-30 years ago     History reviewed  No pertinent family history      Meds/Allergies   current meds:  Current Facility-Administered Medications   Medication Dose Route Frequency    acetaminophen (TYLENOL) tablet 650 mg  650 mg Oral Q6H PRN    calcium carbonate (TUMS) chewable tablet 1,000 mg  1,000 mg Oral Daily PRN    diazepam (VALIUM) tablet 5 mg  5 mg Oral HS PRN    enoxaparin (LOVENOX) subcutaneous injection 40 mg  40 mg Subcutaneous Daily    gabapentin (NEURONTIN) capsule 100 mg  100 mg Oral TID    lactated ringers infusion  20 mL/hr Intravenous Continuous    lactated ringers infusion  50 mL/hr Intravenous Continuous    lactated ringers infusion  1 5 mL/kg/hr Intravenous Continuous    levothyroxine tablet 25 mcg  25 mcg Oral Early Morning    morphine injection 2 mg  2 mg Intravenous Q3H PRN    nortriptyline (PAMELOR) capsule 35 mg  35 mg Oral HS    ondansetron (ZOFRAN) injection 4 mg  4 mg Intravenous Q6H PRN    oxyCODONE (ROXICODONE) immediate release tablet 10 mg  10 mg Oral Q4H PRN    oxyCODONE (ROXICODONE) IR tablet 5 mg  5 mg Oral Q4H PRN    pantoprazole (PROTONIX) EC tablet 40 mg  40 mg Oral Early Morning    traMADol (ULTRAM) tablet 50 mg  50 mg Oral Q6H [address]       PTA meds:   Prescriptions Prior to Admission   Medication    alendronate (FOSAMAX) 70 mg tablet    Ascorbic Acid (VITAMIN C) 1000 MG tablet    cholecalciferol (VITAMIN D3) 1,000 units tablet    diazepam (VALIUM) 5 mg tablet    folic acid (FOLVITE) 1 mg tablet    gabapentin (NEURONTIN) 100 mg capsule    ibuprofen (MOTRIN IB) 200 mg tablet    levothyroxine 25 mcg tablet    Multiple Vitamins-Minerals (CENTRUM SILVER PO)    Nortriptyline HCl (PAMELOR PO)    Psyllium (METAMUCIL FIBER PO)    Vitamin E 400 units TABS     No Known Allergies    Objective   Vitals: Blood pressure 99/55, pulse 81, temperature 98 1 °F (36 7 °C), temperature source Oral, resp  rate 16, height 5' 2" (1 575 m), weight 57 2 kg (126 lb 1 oz), SpO2 100 %, currently breastfeeding  Physical Exam   Constitutional: Pt is in NAD, nontoxic  HENT: nares patent, oropharynx negative for thrush  Head: Normocephalic  Eyes: EOM are normal  Pupils are equal, round, and reactive to light  Neck: Neck supple  Cardiovascular: Normal rate and regular rhythm  No murmur heard    Pulmonary/Chest: Breath sounds normal  No respiratory distress  Pt has no wheezes  Pt has no rales  Abdominal: Soft  Bowel sounds are normal  Pt exhibits no distension  There is no tenderness  There is no rebound and no guarding  Extremities: no LE edema  Neurological: Pt is alert and oriented to person, place, and time  Psychiatric: Pt has a normal mood and affect  Lab Results:   Results from last 7 days  Lab Units 11/28/17  0511   WBC Thousand/uL 8 42   HEMOGLOBIN g/dL 8 2*   HEMATOCRIT % 26 3*   PLATELETS Thousands/uL 222       Results from last 7 days  Lab Units 11/28/17  0511   SODIUM mmol/L 136   POTASSIUM mmol/L 4 2   CHLORIDE mmol/L 103   CO2 mmol/L 29   BUN mg/dL 15   CREATININE mg/dL 0 49*   GLUCOSE RANDOM mg/dL 108   CALCIUM mg/dL 7 7*               Glucose (mg/dL)   Date Value   11/28/2017 108   09/01/2017 85   08/30/2017 118   08/29/2017 120   10/30/2014 89   10/27/2014 95   10/23/2014 88   10/20/2014 97       Labs reviewed    Imaging: reviewed  EKG, Pathology, and Other Studies: I have personally reviewed pertinent reports  VTE Prophylaxis: Enoxaparin (Lovenox)    Code Status: Level 1 - Full Code     Assessment/Plan     1  Left JONATHAN: pain control per primary team  Recommend IS hourly and bowel regimen to help prevent narcotic induced constipation  PT/OT  2  Acute blood loss anemia: Hgb down to 8 2 today  Patient reports feeling fatigued and lightheaded laying in bed  Unsure if she will perform well in therapy  1 Unit of PRBC ordered  Relayed to ortho  3  Hypothyroidism: continue levothyroxine supplement  4  GERd: continue PPI; resume Pepcid on discharge  5  Mild hypotension: resume IVF at NSS 75/hr x 1 liter; to also receive blood as above  6  DVT prophylaxis: Lovenox 40mg daily    Counseling / Coordination of Care  Total floor / unit time spent today 45 minutes  Greater than 50% of total time was spent with the patient and / or family counseling and / or coordination of care        Jerome Mojica PA-C

## 2017-11-28 NOTE — PHYSICAL THERAPY NOTE
Physical Therapy Tx Session:       11/28/17 7936   Pain Assessment   Pain Assessment 0-10   Pain Score Worst Possible Pain   Pain Type Acute pain;Surgical pain  (per pt postop)   Pain Location Hip;Leg   Pain Orientation Left   Hospital Pain Intervention(s) Repositioned;Cold applied; Ambulation/increased activity; Emotional support;Rest;Relaxation technique   Precautions   Total Hip Replacement (posterior total hip precautions)   Restrictions/Precautions   Weight Bearing Precautions Per Order Yes   LLE Weight Bearing Per Order WBAT   Other Precautions Pain; Fall Risk;Telemetry;Multiple lines;WBS;THR  (WBAT LLE,posterior total hip precautions)   General   Chart Reviewed Yes   Family/Caregiver Present No   Cognition   Overall Cognitive Status WFL   Arousal/Participation Alert  (inc L hip pain throughout,limiting mobility)   Attention Difficulty attending to directions  (2* inc pain)   Orientation Level Oriented X4   Following Commands Follows one step commands with increased time or repetition  (2* inc pain,low BP,NSG trying to obtain IV site for blood)   Subjective   Subjective pt supine in bed resting comfortably;pt willing and agreeable to work with PT and to participate in therapy bedlevel intervention at this time;low BP,needs blood per NSG,trying to obtain IV site for blood,inc pain throughout LLE and L hip   Bed Mobility   Additional Comments repositioning following ther ex in supine for comfort and support   Transfers   Sit to Stand Unable to assess  (pt low BP and trying to receive blood products per NSG)   Ambulation/Elevation   Gait Assistance Not tested   Endurance Deficit   Endurance Deficit Yes   Endurance Deficit Description pain   Activity Tolerance   Activity Tolerance Patient limited by fatigue;Patient limited by pain  (poor)   Medical Staff Made Aware ortho present   Nurse Made Aware yes    Exercises   Quad Sets Supine;10 reps;AROM; Left  (hold for 3 seconds each rep)   Heelslides Supine;10 reps;AAROM; Left   Glute Sets Supine;10 reps;AROM; Bilateral  (hold for 3 seconds each rep)   Hip Flexion Supine;10 reps;PROM; Left   Hip Abduction Supine;10 reps;PROM; Left   Hip Adduction Supine;10 reps;PROM; Left  (limited participation 2* inc pain per pt)   Ankle Pumps Supine;20 reps;AROM; Bilateral   Assessment   Prognosis Good   Problem List Decreased strength;Decreased range of motion;Decreased endurance;Decreased mobility; Decreased skin integrity;Orthopedic restrictions;Pain  (WBAT LLE,posterior total hip precautions)   Assessment Bedlevel BLE ther ex HEP in supine performed PROM->AAROM->AROM  Per NSG,pt has low BP,inc pain L hip area and is requiring blood products with IV site needed  Limited mobility at this time 2* above  Repositioning pt supine in bed for comfort and support throughout exercise program  Pt cont to be a PT to see to update mobility goals at this time and for OOB assessment  Pt would cont to benefit from skilled inpt PT services to maximize functional independence   Barriers to Discharge Inaccessible home environment   Goals   Patient Goals to dec pain   STG Expiration Date 12/03/17   Treatment Day 1   Plan   Treatment/Interventions LE strengthening/ROM; Therapeutic exercise; Endurance training;Patient/family training;Spoke to nursing  (PT Sohan Valerio and Wu Santana))   Progress Progressing toward goals   PT Frequency 7x/wk  (BID)   Recommendation   Recommendation (TBD PENDING progress)   Equipment Recommended (TBD)   Skilled PT recommended while in hospital and upon DC to progress pt toward treatment goals

## 2017-11-28 NOTE — PLAN OF CARE
Problem: PHYSICAL THERAPY ADULT  Goal: Performs mobility at highest level of function for planned discharge setting  See evaluation for individualized goals  Treatment/Interventions: Continued evaluation  Equipment Recommended: Other (Comment) (pt owns a RW and SPC; ? BSC)       See flowsheet documentation for full assessment, interventions and recommendations  Outcome: Progressing  Prognosis: Good  Problem List: Decreased strength, Decreased range of motion, Decreased endurance, Decreased mobility, Orthopedic restrictions, Pain  Assessment: pt  does not recall  THP's  reviewed same c her      pt presently recieving  blood trnasfusion  therefore bed level therex only    pt   complete   therex aarom  BLE    she  remains  very painful and  reluctnat to move    pt  was instructed   on need for  therex and mobility        pt  session  ice pack reapplied to  rt hip     will cont  to see   Barriers to Discharge: Inaccessible home environment  Barriers to Discharge Comments: VILMA  Recommendation:  (TBD  pending progress)     PT - OK to Discharge: No    See flowsheet documentation for full assessment

## 2017-11-28 NOTE — OCCUPATIONAL THERAPY NOTE
Occupational Therapy         Patient Name: Robson Claire  Today's Date: 11/28/2017      OT ORDERS RECEIVED AND CHART REVIEWED- PT WITH LOW HGB AND BP THIS AM, NURSING NEEDING TO ESTABLISH IV SITE FOR BLOOD TRANSFUSION AND WAS IN BED, RECEIVING BLOOD AND C/O 10/10 PAIN THIS PM   WILL DEFER AND ADDRESS OT NEEDS IN AM    Sravan Sparks OT

## 2017-11-28 NOTE — PLAN OF CARE
Problem: PHYSICAL THERAPY ADULT  Goal: Performs mobility at highest level of function for planned discharge setting  See evaluation for individualized goals  Treatment/Interventions: Continued evaluation  Equipment Recommended: Other (Comment) (pt owns a RW and SPC; ? BSC)       See flowsheet documentation for full assessment, interventions and recommendations  Outcome: Progressing  Prognosis: Good  Problem List: Decreased strength, Decreased range of motion, Decreased endurance, Decreased mobility, Decreased skin integrity, Orthopedic restrictions, Pain (WBAT LLE,posterior total hip precautions)  Assessment: Bedlevel BLE ther ex HEP in supine performed PROM->AAROM->AROM  Per NSG,pt has low BP,inc pain L hip area and is requiring blood products with IV site needed  Limited mobility at this time 2* above  Repositioning pt supine in bed for comfort and support throughout exercise program  Pt cont to be a PT to see to update mobility goals at this time and for OOB assessment  Pt would cont to benefit from skilled inpt PT services to maximize functional independence  Barriers to Discharge: Inaccessible home environment  Barriers to Discharge Comments: VILMA  Recommendation:  (TBD PENDING progress)     PT - OK to Discharge: No    See flowsheet documentation for full assessment

## 2017-11-28 NOTE — PLAN OF CARE
Problem: PHYSICAL THERAPY ADULT  Goal: Performs mobility at highest level of function for planned discharge setting  See evaluation for individualized goals  Treatment/Interventions: Continued evaluation  Equipment Recommended: Other (Comment) (pt owns a RW and SPC; ? BSC)       See flowsheet documentation for full assessment, interventions and recommendations  Prognosis: Good  Problem List: Decreased strength, Decreased range of motion, Decreased endurance, Impaired balance, Decreased mobility, Decreased coordination, Impaired tone, Decreased skin integrity, Orthopedic restrictions, Pain  Assessment: Pt is a 67year old female presenting s/p REMOVAL IM NAIL CONVERSION TO TOTAL HIP ARTHROPLASTY POSTERIOR APPROACH performed 11/27/17  PT consulted to assess functional mobility and assist with safe d/c planning  PT eval performed POD #0 with WBAT and posterior THP  Pt with a problem list which includes dizziness, anxiety, PVD, UTI, GERD, RLS, and arthritis  PMH includes femur neck fracture, polio and varicella infection  See above for more comprehensive list  PTA, pt living at home alone in a 2 floor home  Pt using a RW  Sister will be available to provide assistance at d/c  On eval, pt presenting with the following deficits: impaired balance, decreased strength and ROM, pain, poor tolerance to activity and decreased overall functional mobility  Pt in supine upon arrival and agreeable to participate in PT session  Pt required max A for bed mobility, transfers and standing  Pt limited by pain, dizziness and lethargy  PT to continue to follow patient  During styay to progress functional mobility (I) and safety  D/C recommendations guarded at this time  Barriers to Discharge: Inaccessible home environment  Barriers to Discharge Comments: VILMA  Recommendation: Other (Comment) (TBD pending progress)     PT - OK to Discharge: No    See flowsheet documentation for full assessment

## 2017-11-28 NOTE — PLAN OF CARE
Problem: DISCHARGE PLANNING - CARE MANAGEMENT  Goal: Discharge to post-acute care or home with appropriate resources  INTERVENTIONS:  - Conduct assessment to determine patient/family and health care team treatment goals, and need for post-acute services based on payer coverage, community resources, and patient preferences, and barriers to discharge  - Address psychosocial, clinical, and financial barriers to discharge as identified in assessment in conjunction with the patient/family and health care team  - Arrange appropriate level of post-acute services according to patient's   needs and preference and payer coverage in collaboration with the physician and health care team  - Communicate with and update the patient/family, physician, and health care team regarding progress on the discharge plan  - Arrange appropriate transportation to post-acute venues  - Discharge home when medically cleared  Outcome: Progressing

## 2017-11-29 LAB
ABO GROUP BLD BPU: NORMAL
ANION GAP SERPL CALCULATED.3IONS-SCNC: 4 MMOL/L (ref 4–13)
BASOPHILS # BLD AUTO: 0.01 THOUSANDS/ΜL (ref 0–0.1)
BASOPHILS NFR BLD AUTO: 0 % (ref 0–1)
BPU ID: NORMAL
BUN SERPL-MCNC: 13 MG/DL (ref 5–25)
CALCIUM SERPL-MCNC: 7.7 MG/DL (ref 8.3–10.1)
CHLORIDE SERPL-SCNC: 102 MMOL/L (ref 100–108)
CO2 SERPL-SCNC: 29 MMOL/L (ref 21–32)
CREAT SERPL-MCNC: 0.42 MG/DL (ref 0.6–1.3)
EOSINOPHIL # BLD AUTO: 0.02 THOUSAND/ΜL (ref 0–0.61)
EOSINOPHIL NFR BLD AUTO: 0 % (ref 0–6)
ERYTHROCYTE [DISTWIDTH] IN BLOOD BY AUTOMATED COUNT: 14.2 % (ref 11.6–15.1)
GFR SERPL CREATININE-BSD FRML MDRD: 102 ML/MIN/1.73SQ M
GLUCOSE SERPL-MCNC: 118 MG/DL (ref 65–140)
HCT VFR BLD AUTO: 30.5 % (ref 34.8–46.1)
HGB BLD-MCNC: 10 G/DL (ref 11.5–15.4)
LYMPHOCYTES # BLD AUTO: 1.23 THOUSANDS/ΜL (ref 0.6–4.47)
LYMPHOCYTES NFR BLD AUTO: 11 % (ref 14–44)
MCH RBC QN AUTO: 28.2 PG (ref 26.8–34.3)
MCHC RBC AUTO-ENTMCNC: 32.8 G/DL (ref 31.4–37.4)
MCV RBC AUTO: 86 FL (ref 82–98)
MONOCYTES # BLD AUTO: 1.34 THOUSAND/ΜL (ref 0.17–1.22)
MONOCYTES NFR BLD AUTO: 12 % (ref 4–12)
NEUTROPHILS # BLD AUTO: 8.19 THOUSANDS/ΜL (ref 1.85–7.62)
NEUTS SEG NFR BLD AUTO: 77 % (ref 43–75)
NRBC BLD AUTO-RTO: 0 /100 WBCS
PLATELET # BLD AUTO: 196 THOUSANDS/UL (ref 149–390)
PMV BLD AUTO: 9.2 FL (ref 8.9–12.7)
POTASSIUM SERPL-SCNC: 3.7 MMOL/L (ref 3.5–5.3)
RBC # BLD AUTO: 3.55 MILLION/UL (ref 3.81–5.12)
SODIUM SERPL-SCNC: 135 MMOL/L (ref 136–145)
UNIT DISPENSE STATUS: NORMAL
UNIT PRODUCT CODE: NORMAL
UNIT RH: NORMAL
WBC # BLD AUTO: 10.85 THOUSAND/UL (ref 4.31–10.16)

## 2017-11-29 PROCEDURE — 97535 SELF CARE MNGMENT TRAINING: CPT

## 2017-11-29 PROCEDURE — G8988 SELF CARE GOAL STATUS: HCPCS

## 2017-11-29 PROCEDURE — 97116 GAIT TRAINING THERAPY: CPT

## 2017-11-29 PROCEDURE — 80048 BASIC METABOLIC PNL TOTAL CA: CPT | Performed by: PHYSICIAN ASSISTANT

## 2017-11-29 PROCEDURE — 97112 NEUROMUSCULAR REEDUCATION: CPT

## 2017-11-29 PROCEDURE — 97166 OT EVAL MOD COMPLEX 45 MIN: CPT

## 2017-11-29 PROCEDURE — G8987 SELF CARE CURRENT STATUS: HCPCS

## 2017-11-29 PROCEDURE — 97110 THERAPEUTIC EXERCISES: CPT

## 2017-11-29 PROCEDURE — 97530 THERAPEUTIC ACTIVITIES: CPT

## 2017-11-29 PROCEDURE — 85025 COMPLETE CBC W/AUTO DIFF WBC: CPT | Performed by: PHYSICIAN ASSISTANT

## 2017-11-29 RX ADMIN — OXYCODONE HYDROCHLORIDE 10 MG: 10 TABLET ORAL at 09:17

## 2017-11-29 RX ADMIN — ENOXAPARIN SODIUM 40 MG: 40 INJECTION SUBCUTANEOUS at 09:11

## 2017-11-29 RX ADMIN — NORTRIPTYLINE HYDROCHLORIDE 35 MG: 25 CAPSULE ORAL at 21:00

## 2017-11-29 RX ADMIN — GABAPENTIN 100 MG: 100 CAPSULE ORAL at 17:38

## 2017-11-29 RX ADMIN — OXYCODONE HYDROCHLORIDE 10 MG: 10 TABLET ORAL at 05:37

## 2017-11-29 RX ADMIN — PANTOPRAZOLE SODIUM 40 MG: 40 TABLET, DELAYED RELEASE ORAL at 05:19

## 2017-11-29 RX ADMIN — TRAMADOL HYDROCHLORIDE 50 MG: 50 TABLET, FILM COATED ORAL at 23:07

## 2017-11-29 RX ADMIN — OXYCODONE HYDROCHLORIDE 10 MG: 10 TABLET ORAL at 17:38

## 2017-11-29 RX ADMIN — LEVOTHYROXINE SODIUM 25 MCG: 25 TABLET ORAL at 05:19

## 2017-11-29 RX ADMIN — GABAPENTIN 100 MG: 100 CAPSULE ORAL at 20:53

## 2017-11-29 RX ADMIN — TRAMADOL HYDROCHLORIDE 50 MG: 50 TABLET, FILM COATED ORAL at 05:19

## 2017-11-29 RX ADMIN — GABAPENTIN 100 MG: 100 CAPSULE ORAL at 09:11

## 2017-11-29 RX ADMIN — ACETAMINOPHEN 650 MG: 325 TABLET, FILM COATED ORAL at 12:07

## 2017-11-29 RX ADMIN — TRAMADOL HYDROCHLORIDE 50 MG: 50 TABLET, FILM COATED ORAL at 17:38

## 2017-11-29 RX ADMIN — OXYCODONE HYDROCHLORIDE 5 MG: 5 TABLET ORAL at 20:53

## 2017-11-29 RX ADMIN — OXYCODONE HYDROCHLORIDE 10 MG: 10 TABLET ORAL at 01:11

## 2017-11-29 RX ADMIN — TRAMADOL HYDROCHLORIDE 50 MG: 50 TABLET, FILM COATED ORAL at 12:07

## 2017-11-29 NOTE — OCCUPATIONAL THERAPY NOTE
[address] Evaluation     Patient Name: Sigifredo Matthews  Today's Date: 11/29/2017  Problem List  Patient Active Problem List   Diagnosis    Dizziness    Anxiety    PVD (peripheral vascular disease) ([facility] [place] [address] )    Urinary tract infection    GERD (gastroesophageal reflux disease)    Epigastric discomfort    RLS (restless legs syndrome)    Arthritis of left hip    S/P total hip arthroplasty     Past Medical History  Past Medical History:   Diagnosis Date    Anxiety     Disease of thyroid gland     HYPO    Femur neck fracture (HCC)     GERD (gastroesophageal reflux disease)     Osteoporosis     Polio     PVD (peripheral vascular disease) ([facility] [place] [address] )     Right BBB/left ant fasc block     UTI (urinary tract infection)     Varicella infection      Past Surgical History  Past Surgical History:   Procedure Laterality Date    APPENDECTOMY      HIP ARTHROPLASTY Left 11/27/2017    Procedure: REMOVAL IM NAIL CONVERSION TO TOTAL HIP ARTHROPLASTY POSTERIOR APPROACH;  Surgeon: Ivon Momin MD;  [facility];  Service: Orthopedics    HIP SURGERY      both hips replaced;2013 left ,2014 right    JOINT REPLACEMENT      HIP TOTAL    [place] CONV PREV HIP SURG TO TOT HIP ARTHROPLAS Left 10/4/2017    Procedure: Hareware removal of left hip;  Surgeon: Ivon Momin MD;  [facility];  Service: Orthopedics    REVISION TOTAL HIP ARTHROPLASTY Right     TONSILLECTOMY        11/29/17 0930   Note Type   Note type Eval/Treat   Restrictions/Precautions   Weight Bearing Precautions Per Order Yes   RLE Weight Bearing Per Order WBAT   LLE Weight Bearing Per Order WBAT   Pain Assessment   Pain Assessment 0-10   Pain Score 8   Pain Type Acute pain;Surgical pain   Pain Location Hip   Pain Orientation Left   Pain Descriptors Discomfort;Aching; Sharp   Pain Frequency Constant/continuous   Pain Onset Ongoing   Effect of Pain on Daily Activities (impaired tolerance to any mobility )   Patient's Stated Pain Goal No pain Home Living   Type of [address] [place] [address] Two level;1/2 bath on main level;Stairs to enter with rails  (3 VILMA)   Bathroom Shower/Tub Tub/shower unit   Bathroom Toilet Standard   Bathroom Accessibility Accessible   Home Equipment Walker;Cane;Grab bars  (GB in tub, shower chair, BSC)   Prior Function   Level of [place] Independent with ADLs and functional mobility   Lives With [address] in the last 6 months 0   Vocational Retired   Comments live alone but will be staying with sister post d c   (sister is in her 66's)   Lifestyle   Autonomy I in ADL/IADLs   Reciprocal Relationships supportive sister and brother    Service to Others retired   Intrinsic Gratification enjoys cleaning and keeping up her house   Psychosocial   Psychosocial (WDL) [address] "I can't stand anymore my legs are too weak"   ADL   Eating Assistance 5  Supervision/Setup   Grooming Assistance 5  Supervision/Setup  (seated)   UB Dressing Assistance 5  Supervision/Setup   LB Dressing Assistance 2  Maximal Assistance  (educated on [email or web address], however still max A)   Toileting Assistance  2  Maximal Assistance   Bed Mobility   Rolling R 2  Maximal assistance   Rolling L 2  Maximal assistance   Supine to Sit 2  Maximal assistance   Sit to Supine 2  Maximal assistance   Additional Comments required A x2 to reposition at session end   Transfers   Sit to Stand 2  Maximal assistance   Additional items Assist x 1; Increased time required;Verbal cues   Stand to Sit 2  Maximal assistance   Additional items Assist x 1;Verbal cues; Increased time required   Functional Mobility   Functional Mobility (unable to asses 2* to pain)   Balance   Static Sitting Fair +   Dynamic Sitting Fair -   Static Standing Poor -   RUE Assessment   RUE Assessment WFL   LUE Assessment   LUE Assessment WFL   Hand Function   Gross Motor Coordination Functional   Fine Motor Coordination Functional   Sensation   Light Touch No apparent deficits   Cognition   Overall Cognitive Status WFL   Arousal/Participation Alert; Cooperative   Attention Within functional limits   Orientation Level Oriented X4   Memory Within functional limits   Following Commands Follows one step commands without difficulty   Comments (unable to redcall hip precautions)   Assessment   Limitation Decreased ADL status; Decreased Safe judgement during ADL;Decreased cognition;Decreased endurance;Decreased self-care trans;Decreased high-level ADLs   Prognosis Good   Assessment Pt  is a 67 y o  female who was admitted to  [facility] on 11/27/2017  REMOVAL IM NAIL CONVERSION TO (l) TOTAL HIP ARTHROPLASTY POSTERIOR APPROACH  Pt  w/a significant PMH of femur neck fracture, polio and varicella infection  Pt  currently lives alone in a 2  with first floor s/u  PTA, pt  (I) in all ADLs/IADLs  Pt used a RW for functional mobility  Currently, pt  requires max A for functional mobility and transfers, set up for UB ADLs and max for LB ADLs  A is needed d/t the following impairments:increased pain, decreased functional activity tolerance, generalized weakness, deconditioning, decreased balance  Additionally, pt  requires mod v c  during functional activity 2* to decreased recall of hip precautions and increased anxiety with movement  Therefore, pt  will benefit from skilled OT services to maximize functional gains, monitor status and prevent decline  Will continue to follow pt  3-5x/week to meet the goals described below in 7-10 days  D c  Recommendations guarded at this time  Defer d c  Recommendations at this time  Will continue to follow please see additional assessment of treatment session following eval below  Plan   Treatment Interventions ADL retraining; Endurance training;Patient/family training;Equipment evaluation/education;Continued evaluation; Activityengagement; Energy conservation   Goal Expiration Date 12/09/17   OT Frequency 3-5x/wk   Additional Treatment Session   Start Time 0940   End Time 0955   Treatment Assessment Pt  seen for OT treatment session to address LB dressing, AE use, and functional/bed mobility  Pt  pleasant and agreeable to participate and overall, pt  tolerated session fair  Limited secondary to pain, fatigue, weakness and deficits noted above  Pt  functioning at a max A level for bed mobility and transfers with the use of RW  Max A was also needed during LB dressing d/t the following deficits:increased pain, decreased functional activity tolerance, generalized weakness, deconditioning  Pt  Educated on LBAD however still required max A  It should be noted that once standing pt  Was unable to release hands from walker to pull up pants d/t decreased balance, weakness, pain and inability to bear weight through LLE  Pt  will continue to benefit from OT services to address noted deficits and maximize functional gains  Recommendation   Recommendation (defer at this time, will continue to follow/assess)   Barthel Index   Feeding 10   Bathing 0   Grooming Score 5   Dressing Score 5   Bladder Score 10   Bowels Score 10   Toilet Use Score 5   Transfers (Bed/Chair) Score 5   Mobility (Level Surface) Score 0   Stairs Score 0   Barthel Index Score 50   Modified Cari Scale   Modified [place] Scale 4     Goals  Patient will perform all functional mobility and transfers at a mod I level with use of the least restrictive device  Pt  will perform bed mobility at a mod I level with G balance and activity tolerance  Pt  will complete LB/UB dressing at a mod I level with AE as needed  Pt  will complete all grooming activities at a mod I level while standing at sink  Pt  will complete a toileting tasks at a mod I level  Pt will complete LB/UB bathing at a mod I level with the use of AE as needed      Patient will participate in 15 minutes of functional activity without any overt signs of fatigue or abnormal vitals to demonstrate G endurance as it is required for ADLs/functional activity  Pt  will verbalize 3/3 posterior hip precautions without v c  to demonstrate increased recall and understanding of precautions and improve safety during functional activity        Roxana Gallagher, MOT, OTR/L

## 2017-11-29 NOTE — PROGRESS NOTES
Orthopedics   Geetha Colon 67 y o  female MRN: [id number]  Unit/Bed#: CW3 343-01      Subjective:  67 y  o female post operative day 2 AMADOU and conversion to left total hip arthroplasty  Complaining of some pain that improves with administration of pain medication  Denies numbness or tingling     Labs:    0  Lab Value Date/Time   HCT 30 5 (L) 11/29/2017 0437   HCT 26 3 (L) 11/28/2017 0511   HCT 38 1 11/06/2017 0638   HCT 36 8 12/16/2014 0727   HCT 28 8 (L) 10/30/2014 0626   HCT 29 2 (L) 10/27/2014 0648   HGB 10 0 (L) 11/29/2017 0437   HGB 8 2 (L) 11/28/2017 0511   HGB 12 0 11/06/2017 0638   HGB 11 9 12/16/2014 0727   HGB 8 8 (L) 10/30/2014 0626   HGB 9 0 (L) 10/27/2014 0648   INR 0 95 11/09/2017 1217   INR 0 96 10/13/2014 0608   WBC 10 85 (H) 11/29/2017 0437   WBC 8 42 11/28/2017 0511   WBC 6 58 11/06/2017 0638   WBC 5 60 12/16/2014 0727   WBC 5 58 10/30/2014 0626   WBC 6 95 10/27/2014 0648       Meds:    Current Facility-Administered Medications:     acetaminophen (TYLENOL) tablet 650 mg, 650 mg, Oral, Q6H PRN, Leda Bella PA-C    calcium carbonate (TUMS) chewable tablet 1,000 mg, 1,000 mg, Oral, Daily PRN, Leda Bella PA-C    diazepam (VALIUM) tablet 5 mg, 5 mg, Oral, HS PRN, Leda Bella PA-C, 5 mg at 11/28/17 2315    enoxaparin (LOVENOX) subcutaneous injection 40 mg, 40 mg, Subcutaneous, Daily, Leda Bella PA-C, 40 mg at 11/28/17 0856    gabapentin (NEURONTIN) capsule 100 mg, 100 mg, Oral, TID, Leda Bella PA-C, 100 mg at 11/28/17 2119    lactated ringers infusion, 20 mL/hr, Intravenous, Continuous, Charla Crandall MD, Stopped at 11/28/17 1809    lactated ringers infusion, 50 mL/hr, Intravenous, Continuous, Jesus Manuel Nails CRNA, Stopped at 11/27/17 1740    lactated ringers infusion, 1 5 mL/kg/hr, Intravenous, Continuous, Leda Bella PA-C, Stopped at 11/28/17 3074    levothyroxine tablet 25 mcg, 25 mcg, Oral, Early Morning, Leda Bella PA-C, 25 mcg at 11/29/17 0519    morphine injection 2 mg, 2 mg, Intravenous, Q3H PRN, Katherine Bravo, PA-C, 2 mg at 11/28/17 0857    nortriptyline (PAMELOR) capsule 35 mg, 35 mg, Oral, HS, Katherine Shelly, PA-C, 35 mg at 11/28/17 2119    ondansetron (ZOFRAN) injection 4 mg, 4 mg, Intravenous, Q6H PRN, Katherine Bravo PA-C    oxyCODONE (ROXICODONE) immediate release tablet 10 mg, 10 mg, Oral, Q4H PRN, Katherine Bravo, PA-C, 10 mg at 11/29/17 0537    oxyCODONE (ROXICODONE) IR tablet 5 mg, 5 mg, Oral, Q4H PRN, Katherine Bravo PA-C    pantoprazole (PROTONIX) EC tablet 40 mg, 40 mg, Oral, Early Morning, Katherine Bravo PA-C, 40 mg at 11/29/17 0519    traMADol (ULTRAM) tablet 50 mg, 50 mg, Oral, Q6H [address], Katherine Bravo, PA-C, 50 mg at 11/29/17 0519    Blood Culture:   No results found for: BLOODCX    Wound Culture:   No results found for: WOUNDCULT    Ins and Outs:  I/O last 24 hours: In: 1707 9 [P O :1088; I V :269 9; Blood:350]  Out: 630 [Urine:630]          Physical Exam:  Vitals:    11/29/17 0300   BP: 106/54   Pulse: 87   Resp: 18   Temp: 99 °F (37 2 °C)   SpO2: 94%     Left lower extremity:  · Dressings C/D/I  · Sensation intact s/s/sp/dp/t  · +ankle df/pf, ehl/fhl  · Toes WWP    _*_*_*_*_*_*_*_*_*_*_*_*_*_*_*_*_*_*_*_*_*_*_*_*_*_*_*_*_*_*_*_*_*_*_*_*_*_*_*_*_*    Assessment: 67 y  o female post operative day 2 left total hip arthroplasty with controlled pain  Plan:  · Weight Bearing as tolerated LLE  · Up and out of bed  · Total hip precautions posterior   · DVT prophylaxis  · Analgesics  · PT/OT  · JATIN, will continue to assess and resuscitate as necessary       Cynthia Hirsch MD

## 2017-11-29 NOTE — PLAN OF CARE
Problem: PHYSICAL THERAPY ADULT  Goal: Performs mobility at highest level of function for planned discharge setting  See evaluation for individualized goals  Treatment/Interventions: Continued evaluation  Equipment Recommended: Other (Comment) (pt owns a RW and SPC; ? BSC)       See flowsheet documentation for full assessment, interventions and recommendations  Outcome: Progressing  Prognosis: Good  Problem List: Decreased strength, Decreased range of motion, Impaired balance, Decreased endurance, Decreased mobility, Decreased coordination, Impaired tone, Decreased skin integrity, Orthopedic restrictions, Pain  Assessment: The pt  was able to progress to ambulation this P M , but she required extensive time in order to complete  She was only able to move her feet approximately one inch with each step, and she required 15-30 seconds in-between each step  This did improve with the last three feet of gait, but she remained unable to take more than a two-inch step at a time  She was able to maintain static standing throughout with only minimal assistance  She initially had very limited range of motion with therapeutic exercise, but with subsequent repetitions this did improve  She was educated in her precautions, and she was able to adhere to them throughout the session  She also was educated in the benefits of utilizing ice packs to help with her pain  She does continue to remain significantly limited from her baseline, and she will benefit from continued physical therapy in order to safely progress her functional mobility  Barriers to Discharge: Inaccessible home environment, Decreased caregiver support  Barriers to Discharge Comments: VILMA, heavy A for OOB at this time  Recommendation: Post acute IP rehab (Pending continued progress  )     PT - OK to Discharge: Yes (to rehab when medically appropriate )    See flowsheet documentation for full assessment

## 2017-11-29 NOTE — PHYSICAL THERAPY NOTE
Physical Therapy Progress Note     11/29/17 1445   Pain Assessment   Pain Assessment 0-10   Pain Score 6   Pain Type Surgical pain   Pain Location Hip   Pain Orientation Left   Hospital Pain Intervention(s) Cold applied;Repositioned; Ambulation/increased activity; Emotional support   Response to Interventions Satisfied  Restrictions/Precautions   RLE Weight Bearing Per Order WBAT   LLE Weight Bearing Per Order WBAT   Other Precautions Chair Alarm; Fall Risk;Telemetry;THR   Subjective   Subjective The pt  states that she is feeling better than this morning's session  She was agreeable to ambulate to the bed, but she expressed fear of being able to walk any further than that  She was pleased that she did better this P M  Bed Mobility   Supine to Sit 4  Minimal assistance   Additional items Assist x 2; Increased time required;Verbal cues;LE management   Transfers   Sit to Stand 4  Minimal assistance   Additional items Assist x 1; Increased time required   Stand to Sit 4  Minimal assistance   Additional items Assist x 1; Increased time required   Ambulation/Elevation   Gait pattern Excessively slow; Step to;Short stride; Inconsistent ora; Shuffling;Decreased L stance;Decreased foot clearance;Narrow JEOVANY   Gait Assistance 4  Minimal assist   Additional items Assist x 1;Verbal cues; Tactile cues   Assistive Device Rolling walker   Distance 7 feet  Balance   Static Sitting Good   Static Standing Poor +   Ambulatory Poor +   Activity Tolerance   Activity Tolerance Patient tolerated treatment well;Patient limited by pain   Nurse [address] [place] [address] Road, RN  Exercises   THR Sitting;Left;AAROM;20 reps   Assessment   Prognosis Good   Problem List Decreased strength;Decreased range of motion; Impaired balance;Decreased endurance;Decreased mobility; Decreased coordination; Impaired tone;Decreased skin integrity;Orthopedic restrictions;Pain   Assessment The pt  was able to progress to ambulation this P M , but she required extensive time in order to complete  She was only able to move her feet approximately one inch with each step, and she required 15-30 seconds in-between each step  This did improve with the last three feet of gait, but she remained unable to take more than a two-inch step at a time  She was able to maintain static standing throughout with only minimal assistance  She initially had very limited range of motion with therapeutic exercise, but with subsequent repetitions this did improve  She was educated in her precautions, and she was able to adhere to them throughout the session  She also was educated in the benefits of utilizing ice packs to help with her pain  She does continue to remain significantly limited from her baseline, and she will benefit from continued physical therapy in order to safely progress her functional mobility  Barriers to Discharge Inaccessible home environment;Decreased caregiver support   Goals   Patient Goals To feel better  STG Expiration Date 12/06/17   Treatment Day 2   Plan   Treatment/Interventions Functional transfer training;LE strengthening/ROM; Elevations; Therapeutic exercise; Endurance training;Patient/family training;Bed mobility;Gait training   Progress Progressing toward goals   PT Frequency Twice a day   Recommendation   Recommendation Post acute IP rehab  (Pending continued progress )   Equipment Recommended Lawanda Elizondo PTA

## 2017-11-29 NOTE — PLAN OF CARE
Problem: OCCUPATIONAL THERAPY ADULT  Goal: Performs self-care activities at highest level of function for planned discharge setting  See evaluation for individualized goals  Treatment Interventions: ADL retraining, Endurance training, Patient/family training, Equipment evaluation/education, Continued evaluation, Activityengagement, Energy conservation          See flowsheet documentation for full assessment, interventions and recommendations  Limitation: Decreased ADL status, Decreased Safe judgement during ADL, Decreased cognition, Decreased endurance, Decreased self-care trans, Decreased high-level ADLs  Prognosis: Good  Assessment: Pt  is a 67 y o  female who was admitted to  [facility] on 11/27/2017  REMOVAL IM NAIL CONVERSION TO (l) TOTAL HIP ARTHROPLASTY POSTERIOR APPROACH  Pt  w/a significant PMH of femur neck fracture, polio and varicella infection  Pt  currently lives alone in a 2  with first floor s/u  PTA, pt  (I) in all ADLs/IADLs  Pt used a RW for functional mobility  Currently, pt  requires max A for functional mobility and transfers, set up for UB ADLs and max for LB ADLs  A is needed d/t the following impairments:increased pain, decreased functional activity tolerance, generalized weakness, deconditioning, decreased balance  Additionally, pt  requires mod v c  during functional activity 2* to decreased recall of hip precautions and increased anxiety with movement  Therefore, pt  will benefit from skilled OT services to maximize functional gains, monitor status and prevent decline  Will continue to follow pt  3-5x/week to meet the goals described below in 7-10 days  Defer d c  Recommendations at this time  Will continue to follow please see additional assessment of treatment session following eval below    Recommendation:  (defer at this time, will continue to follow/assess)     FELICITAS Harris, OTR/L

## 2017-11-29 NOTE — PHYSICAL THERAPY NOTE
PT TREATMENT     11/29/17 1202   Pain Assessment   Pain Assessment 0-10   Pain Score Worst Possible Pain   Pain Type Acute pain;Surgical pain   Pain Location Hip   Pain Orientation Left   Effect of Pain on Daily Activities impaired quality and tolerance to any mobility    Patient's Stated Pain Goal No pain   Hospital Pain Intervention(s) Cold applied;Repositioned; Ambulation/increased activity   Precautions   Total Hip Replacement ADduction; Internal rotation; Flexion   Restrictions/Precautions   Weight Bearing Precautions Per Order Yes   LLE Weight Bearing Per Order WBAT   Other Precautions Chair Alarm; Bed Alarm;WBS;THR;Fall Risk;Pain   General   Chart Reviewed Yes   Additional Pertinent History POD #2   Response to Previous Treatment Patient reporting fatigue but able to participate  Family/Caregiver Present No   Cognition   Overall Cognitive Status WFL   Arousal/Participation Cooperative   Attention Within functional limits   Orientation Level Oriented X4   Memory Decreased recall of precautions   Following Commands Follows one step commands without difficulty   Comments Pt able to recall 2/3 THP  Re-educated and will continue to reinforce    Subjective   Subjective Pt agreeable to particiapte in PT session    Bed Mobility   Supine to Sit 2  Maximal assistance   Additional items Assist x 1; Increased time required;Verbal cues;LE management   Additional Comments Pt transferred EOB with maxAx1  Able to sit with S EOB for 5 minutes to perform seated therex  Transfers   Sit to Stand 2  Maximal assistance   Additional items Assist x 1; Increased time required;Verbal cues  (2nd person present for safety )   Stand to Sit 2  Maximal assistance   Additional items Assist x 1   Stand pivot 2  Maximal assistance   Additional items Assist x 1   Additional Comments Pt performed sit<->stand with max Ax1 and second person present for safety  Pt stood appx 60 seconds but was unable to achieve erect posture 2* pain in LLE  Attempted weight shifting with patient resistance  Unable to advance LE for ambulation with RW  Stand pivot transfer performed for OOB  Ambulation/Elevation   Gait pattern (Unable to advance LE )   Balance   Static Sitting Fair +   Dynamic Sitting Fair -   Static Standing Poor   Dynamic Standing Poor -   Endurance Deficit   Endurance Deficit Yes   Endurance Deficit Description fatigue, pain    Activity Tolerance   Activity Tolerance Patient limited by fatigue;Patient limited by pain   Medical Staff Made Aware Spoke with CM directly in regards to d c planning    Nurse MARYAM Guerra    Exercises   Heelslides Supine;20 reps;AAROM; Left   Hip Flexion Supine;20 reps;AAROM; Left   Hip Abduction Sitting;20 reps;AAROM; Left   Hip Adduction Sitting;20 reps;AAROM; Left   Knee AROM Long Arc Quad Sitting;20 reps;AAROM; Left   Ankle Pumps Sitting;20 reps;AAROM; Left   Assessment   Prognosis Good   Problem List Decreased strength;Decreased range of motion; Impaired balance;Decreased endurance;Decreased mobility; Decreased coordination; Impaired tone;Decreased skin integrity;Orthopedic restrictions;Pain   Assessment Pt limited during session 2* pain  Notable swelling in LLE and poor tolerance to mobility  Pt remains unable to ambulate 2* above deficits  Stand pivot transfer performed for OOB to increase tolerance to upright  Pt educated throughout session on THP and benefits of mobility  Will continue to progress patient's mobility as able  Rehab recommedned at this time    Barriers to Discharge Inaccessible home environment;Decreased caregiver support   Barriers to Discharge Comments VILMA, heavy A for OOB at this time   Goals   Patient Goals to have less pain    STG Expiration Date 12/06/17   Short Term Goal #1 Pt will be Chase with bed mobility  Pt will be Chase with supine<->sit  Pt will be modA with sit<->stand  Pt will stand >3 minutes with CGA  Short Term Goal #2 Additional goals: pt with be Chase with stand pivot transfer  Pt will perform pre gait activities with RW : OOB to chair  Treatment Day 0   Plan   Treatment/Interventions Functional transfer training;LE strengthening/ROM; Therapeutic exercise; Endurance training;Cognitive reorientation;Patient/family training;Equipment eval/education; Bed mobility;Gait training;Spoke to nursing;OT   Progress Slow progress, decreased activity tolerance   PT Frequency 7x/wk; Twice a day   Recommendation   Recommendation Short-term skilled PT;Post acute IP rehab   Equipment Recommended Walker   PT - OK to Discharge Yes  (to rehab when medically appropriate )   Additional Comments OOB in chair with foot pumps and alarm activated  All needs within reach          Cathy Freeman, PT

## 2017-11-29 NOTE — PLAN OF CARE
DISCHARGE PLANNING     Discharge to home or other facility with appropriate resources Progressing        DISCHARGE PLANNING - CARE MANAGEMENT     Discharge to post-acute care or home with appropriate resources Progressing        HEMATOLOGIC - ADULT     Maintains hematologic stability Progressing        INFECTION - ADULT     Absence or prevention of progression during hospitalization Progressing        Knowledge Deficit     Patient/family/caregiver demonstrates understanding of disease process, treatment plan, medications, and discharge instructions Progressing        METABOLIC, FLUID AND ELECTROLYTES - ADULT     Electrolytes maintained within normal limits Progressing     Fluid balance maintained Progressing     Glucose maintained within target range Progressing        MUSCULOSKELETAL - ADULT     Maintain or return mobility to safest level of function Progressing     Maintain proper alignment of affected body part Progressing        PAIN - ADULT     Verbalizes/displays adequate comfort level or baseline comfort level Progressing        Potential for Falls     Patient will remain free of falls Progressing        Prexisting or High Potential for Compromised Skin Integrity     Skin integrity is maintained or improved Progressing        SAFETY ADULT     Maintain or return to baseline ADL function Progressing     Maintain or return mobility status to optimal level Progressing        SKIN/TISSUE INTEGRITY - ADULT     Skin integrity remains intact Progressing     Incision(s), wounds(s) or drain site(s) healing without S/S of infection Progressing     Oral mucous membranes remain intact Progressing

## 2017-11-29 NOTE — SOCIAL WORK
PT recommendation for inpt rehab  Gave patient SNF list, she will review with her daughter for choices  CM will follow

## 2017-11-29 NOTE — DISCHARGE SUMMARY
ORTHOPEDICS DISCHARGE SUMMARY   Geetha Rivera 67 y o  female MRN: [id number]  Unit/Bed#: CW3 343-01      Attending Physician: Pio Costello    Admitting diagnosis: Primary osteoarthritis of left hip [M16 12]    Discharge diagnosis: Primary osteoarthritis of left hip [M16 12]    Date of admission: 11/27/2017    Date of discharge: 11/29/17    Procedure: left removal of femoral IMN, posterior JONATHAN    HPI  This is a 67y o  year old female that presented to the office with signs and symptoms of left hip osteoarthritis  They tried and failed conservative treatment measures and wished to proceed with surgical intervention  The risks, benefits, and complications of the procedure were discussed with the patient and informed consent was obtained  Hospital Course: The patient was admitted to the hospital on 11/27/2017 and underwent an uncomplicated left removal of IMN and posterior total hip arthroplasty  They were transferred to the floor after a brief stay in the post-anesthesia care unit  Their pain was well managed with IV and oral pain medications  They began therapy on post operative day #1  Lovenox was also started for DVT prophylaxis post operative day #1  Post operative course was uneventful  On discharge date pt was cleared by PT and the medicine team and determined to be safe for discharge  Daily discussion was had with the patient, nursing staff, orthopaedic team, and family members if present  All questions were answered to the patients satisifaction        0  Lab Value Date/Time   HGB 10 0 (L) 11/29/2017 0437   HGB 8 2 (L) 11/28/2017 0511   HGB 12 0 11/06/2017 0638   HGB 11 9 09/01/2017 0701   HGB 12 9 08/29/2017 2145   HGB 12 6 08/22/2017 1445   HGB 12 9 04/25/2017 0639   HGB 12 4 03/01/2016 0740   HGB 11 9 12/16/2014 0727   HGB 8 8 (L) 10/30/2014 0626   HGB 9 0 (L) 10/27/2014 0648   HGB 8 4 (L) 10/23/2014 0640   HGB 8 9 (L) 10/20/2014 0632   HGB 9 5 (L) 10/16/2014 0608   HGB 10 4 (L) 10/15/2014 0613   HGB 9 0 (L) 10/14/2014 0549   HGB 10 2 (L) 10/13/2014 0608   HGB 12 1 10/12/2014 0826        Discharge Instructions: The patient was discharged weight bearing as tolerated to the left lower extremity  Lovenox will be continued for 28 days  Continue PT/OT  Take pain medications as instructed  Maintain posterior hip precautions  Discharge Medications: For the complete list of discharge medications, please refer to the patient's medication reconciliation

## 2017-11-29 NOTE — PROGRESS NOTES
Internal Medicine Progress Note  Patient: Rosario Zheng Colon  Age/sex: 67 y o  female  Medical Record #: [id number]      ASSESSMENT/PLAN:  1  Left JONATHAN: pain control per primary team  Recommend IS hourly and bowel regimen to help prevent narcotic induced constipation  PT/OT  2  Acute blood loss anemia: Hgb improved to 10 0 today s/p transfusion  3  Hypothyroidism: continue levothyroxine supplement  4  GERd: continue PPI; resume Pepcid on discharge  5  Mild hypotension: improved   6  DVT prophylaxis: Lovenox 40mg daily  7  D/C planning: home today per ortho; ok from medicine standpoint for d/c      Subjective: Patient seen and examined   No new complaints overnight; slept well, no further dizziness/lightheadedness; pain currently but just received meds  ROS:   GI: denies abdominal pain, change bowel habits or reflux symptoms  Neuro: No new neurologic changes  Respiratory: No Cough, SOB  Cardiovascular: No CP, palpitations   EXT: +L hip pain    Scheduled Meds:    enoxaparin 40 mg Subcutaneous Daily   gabapentin 100 mg Oral TID   levothyroxine 25 mcg Oral Early Morning   nortriptyline 35 mg Oral HS   pantoprazole 40 mg Oral Early Morning   traMADol 50 mg Oral Q6H [address]       Labs:       Results from last 7 days  Lab Units 11/29/17  0437 11/28/17  0511   WBC Thousand/uL 10 85* 8 42   HEMOGLOBIN g/dL 10 0* 8 2*   HEMATOCRIT % 30 5* 26 3*   PLATELETS Thousands/uL 196 222       Results from last 7 days  Lab Units 11/29/17  0437 11/28/17  0511   SODIUM mmol/L 135* 136   POTASSIUM mmol/L 3 7 4 2   CHLORIDE mmol/L 102 103   CO2 mmol/L 29 29   BUN mg/dL 13 15   CREATININE mg/dL 0 42* 0 49*   GLUCOSE RANDOM mg/dL 118 108   CALCIUM mg/dL 7 7* 7 7*                Glucose (mg/dL)   Date Value   11/29/2017 118   11/28/2017 108   09/01/2017 85   08/30/2017 118   10/30/2014 89   10/27/2014 95   10/23/2014 88   10/20/2014 97       Labs reviewed    Physical Examination:  Vitals:   Vitals:    11/28/17 2000 11/28/17 2254 11/29/17 0300 11/29/17 0700   BP:  146/78 106/54 108/65   Pulse:  92 87 92   Resp:  19 18 18   Temp:  99 1 °F (37 3 °C) 99 °F (37 2 °C) 98 3 °F (36 8 °C)   TempSrc:  Oral Oral Oral   SpO2: 96% 100% 94% 97%   Weight:       Height:           GEN: NAD  HEENT: NC/AT, EOMI  RESP: CTAB, no R/R/W  CV: +S1 S2, regular rate, no rubs  ABD: soft, NT, ND, normal BS   : voiding without difficulty  EXT: 4/5 LLE strength; others normal  Skin:Incisional dressing intact; good cap refill  Neuro: Alert and Oriented x3; appropriate      [x ] Total time spent: 30 Mins and greater than 50% of this time was spent counseling/coordinating care        Poornima Huizar, [address]   Internal Medicine

## 2017-11-29 NOTE — PLAN OF CARE
Problem: PHYSICAL THERAPY ADULT  Goal: Performs mobility at highest level of function for planned discharge setting  See evaluation for individualized goals  Treatment/Interventions: Continued evaluation  Equipment Recommended: Other (Comment) (pt owns a RW and SPC; ? BSC)       See flowsheet documentation for full assessment, interventions and recommendations  Outcome: Progressing  Prognosis: Good  Problem List: Decreased strength, Decreased range of motion, Impaired balance, Decreased endurance, Decreased mobility, Decreased coordination, Impaired tone, Decreased skin integrity, Orthopedic restrictions, Pain  Assessment: Pt limited during session 2* pain  Notable swelling in LLE and poor tolerance to mobility  Pt remains unable to ambulate 2* above deficits  Stand pivot transfer performed for OOB to increase tolerance to upright  Pt educated throughout session on THP and benefits of mobility  Will continue to progress patient's mobility as able  Rehab recommedned at this time   Barriers to Discharge: Inaccessible home environment, Decreased caregiver support  Barriers to Discharge Comments: VILMA, heavy A for OOB at this time  Recommendation: Short-term skilled PT, Post acute IP rehab     PT - OK to Discharge: Yes (to rehab when medically appropriate )    See flowsheet documentation for full assessment

## 2017-11-30 LAB
ANION GAP SERPL CALCULATED.3IONS-SCNC: 4 MMOL/L (ref 4–13)
BUN SERPL-MCNC: 11 MG/DL (ref 5–25)
CALCIUM SERPL-MCNC: 7.9 MG/DL (ref 8.3–10.1)
CHLORIDE SERPL-SCNC: 99 MMOL/L (ref 100–108)
CO2 SERPL-SCNC: 30 MMOL/L (ref 21–32)
CREAT SERPL-MCNC: 0.39 MG/DL (ref 0.6–1.3)
GFR SERPL CREATININE-BSD FRML MDRD: 105 ML/MIN/1.73SQ M
GLUCOSE SERPL-MCNC: 97 MG/DL (ref 65–140)
POTASSIUM SERPL-SCNC: 3.8 MMOL/L (ref 3.5–5.3)
SODIUM SERPL-SCNC: 133 MMOL/L (ref 136–145)

## 2017-11-30 PROCEDURE — 97110 THERAPEUTIC EXERCISES: CPT

## 2017-11-30 PROCEDURE — 97535 SELF CARE MNGMENT TRAINING: CPT

## 2017-11-30 PROCEDURE — 97112 NEUROMUSCULAR REEDUCATION: CPT

## 2017-11-30 PROCEDURE — 80048 BASIC METABOLIC PNL TOTAL CA: CPT | Performed by: PHYSICIAN ASSISTANT

## 2017-11-30 PROCEDURE — 97530 THERAPEUTIC ACTIVITIES: CPT

## 2017-11-30 PROCEDURE — 97116 GAIT TRAINING THERAPY: CPT

## 2017-11-30 RX ADMIN — GABAPENTIN 100 MG: 100 CAPSULE ORAL at 08:04

## 2017-11-30 RX ADMIN — TRAMADOL HYDROCHLORIDE 50 MG: 50 TABLET, FILM COATED ORAL at 05:42

## 2017-11-30 RX ADMIN — LEVOTHYROXINE SODIUM 25 MCG: 25 TABLET ORAL at 05:42

## 2017-11-30 RX ADMIN — TRAMADOL HYDROCHLORIDE 50 MG: 50 TABLET, FILM COATED ORAL at 23:31

## 2017-11-30 RX ADMIN — TRAMADOL HYDROCHLORIDE 50 MG: 50 TABLET, FILM COATED ORAL at 12:49

## 2017-11-30 RX ADMIN — TRAMADOL HYDROCHLORIDE 50 MG: 50 TABLET, FILM COATED ORAL at 16:59

## 2017-11-30 RX ADMIN — OXYCODONE HYDROCHLORIDE 10 MG: 10 TABLET ORAL at 08:04

## 2017-11-30 RX ADMIN — ENOXAPARIN SODIUM 40 MG: 40 INJECTION SUBCUTANEOUS at 08:04

## 2017-11-30 RX ADMIN — NORTRIPTYLINE HYDROCHLORIDE 35 MG: 25 CAPSULE ORAL at 21:32

## 2017-11-30 RX ADMIN — OXYCODONE HYDROCHLORIDE 10 MG: 10 TABLET ORAL at 16:56

## 2017-11-30 RX ADMIN — OXYCODONE HYDROCHLORIDE 10 MG: 10 TABLET ORAL at 03:35

## 2017-11-30 RX ADMIN — GABAPENTIN 100 MG: 100 CAPSULE ORAL at 21:32

## 2017-11-30 RX ADMIN — ACETAMINOPHEN 650 MG: 325 TABLET, FILM COATED ORAL at 12:49

## 2017-11-30 RX ADMIN — PANTOPRAZOLE SODIUM 40 MG: 40 TABLET, DELAYED RELEASE ORAL at 05:42

## 2017-11-30 RX ADMIN — GABAPENTIN 100 MG: 100 CAPSULE ORAL at 16:56

## 2017-11-30 RX ADMIN — OXYCODONE HYDROCHLORIDE 10 MG: 10 TABLET ORAL at 21:33

## 2017-11-30 NOTE — PHYSICAL THERAPY NOTE
Physical Therapy Progress Note     11/30/17 1110   Pain Assessment   Pain Assessment 0-10   Pain Score 4   Pain Type Surgical pain   Pain Location Hip   Pain Orientation Left   Hospital Pain Intervention(s) Cold applied;Repositioned; Ambulation/increased activity; Emotional support   Response to Interventions Tolerated  Precautions   Total Hip Replacement ADduction; Flexion; Internal rotation   Restrictions/Precautions   Other Precautions Pain; Fall Risk;THR;WBS   Subjective   Subjective The pt  states that she is doing a little better  She reports that her pain has improved a little bit from yesterday  She also notes that she has more difficulty moving her feet today and that "they are stuck "   Transfers   Sit to Stand 3  Moderate assistance   Additional items Assist x 1; Increased time required;Verbal cues   Stand to Sit 4  Minimal assistance   Additional items Assist x 1; Increased time required;Verbal cues   Stand pivot 3  Moderate assistance   Additional items Assist x 1; Increased time required;Verbal cues   Ambulation/Elevation   Gait pattern Excessively slow; Short stride; Step to; Inconsistent ora; Forward Flexion;L Foot drag;R Foot drag   Gait Assistance 3  Moderate assist   Additional items Assist x 1;Verbal cues; Tactile cues   Assistive Device Rolling walker   Distance 2 feet, 1 foot  Balance   Static Sitting Good   Dynamic Sitting Fair   Static Standing Poor +   Ambulatory Poor   Activity Tolerance   Activity Tolerance Patient tolerated treatment well;Patient limited by fatigue;Patient limited by pain   Medical Staff Rajiv Cuevas CM; Erna Kevin resident  Nurse Ramya Corrales RN  Exercises   THR Sitting;Left;AAROM;15 reps  (x2 sets )   Assessment   Prognosis Good   Problem List Decreased strength;Decreased range of motion; Impaired balance;Decreased endurance;Decreased mobility; Decreased coordination; Impaired tone;Decreased skin integrity;Orthopedic restrictions;Pain   Assessment The pt  has improved range of motion with therapeutic exercise, but she had significantly increased difficutly advancing either leg with ambulation  She required physical assistance in order to facilitate forward movement of her LLE for gait  She was able to stand for 3-5 minutes at a time with attempts at gait, but then she required to sit  She continues to remain unsafe to return home at this time  Will continue to follow  Barriers to Discharge Inaccessible home environment;Decreased caregiver support   Goals   Patient Goals To go home  STG Expiration Date 12/06/17   Treatment Day 3   Plan   Treatment/Interventions Functional transfer training;LE strengthening/ROM; Therapeutic exercise; Endurance training;Patient/family training;Bed mobility;Gait training   Progress Progressing toward goals   PT Frequency Twice a day   Recommendation   Recommendation Post acute IP rehab   Equipment Recommended Rachel Beltran PTA

## 2017-11-30 NOTE — SOCIAL WORK
CM met with patient this morning to get choices  Pt stated she only wants to go to [facility]  Pt states she will speak to her family this afternoon and give some additional choices this afternoon  CM notified Delvin Chung  Referral sent via [facility] to [facility] per pts request  CM will continue to follow

## 2017-11-30 NOTE — SOCIAL WORK
CM met with patient and daughter Jaylin Howard at bedside  Additional SNF choices given by daughter  Pt in agreement with choices 1  [address], [address]  [place] , [address] [address]  [facility]  Referrals sent via [address] [place] [address]

## 2017-11-30 NOTE — PROGRESS NOTES
Orthopedics   Geetha Colon 67 y o  female MRN: [id number]  Unit/Bed#: CW3 343-01      Subjective:  67 y  o female post operative day 3 AMADOU and conversion to left total hip arthroplasty   Pain improving    Labs:    0  Lab Value Date/Time   HCT 30 5 (L) 11/29/2017 0437   HCT 26 3 (L) 11/28/2017 0511   HCT 38 1 11/06/2017 0638   HCT 36 8 12/16/2014 0727   HCT 28 8 (L) 10/30/2014 0626   HCT 29 2 (L) 10/27/2014 0648   HGB 10 0 (L) 11/29/2017 0437   HGB 8 2 (L) 11/28/2017 0511   HGB 12 0 11/06/2017 0638   HGB 11 9 12/16/2014 0727   HGB 8 8 (L) 10/30/2014 0626   HGB 9 0 (L) 10/27/2014 0648   INR 0 95 11/09/2017 1217   INR 0 96 10/13/2014 0608   WBC 10 85 (H) 11/29/2017 0437   WBC 8 42 11/28/2017 0511   WBC 6 58 11/06/2017 0638   WBC 5 60 12/16/2014 0727   WBC 5 58 10/30/2014 0626   WBC 6 95 10/27/2014 0648       Meds:    Current Facility-Administered Medications:     acetaminophen (TYLENOL) tablet 650 mg, 650 mg, Oral, Q6H PRN, Marco A Jean PA-C, 650 mg at 11/29/17 1207    calcium carbonate (TUMS) chewable tablet 1,000 mg, 1,000 mg, Oral, Daily PRN, Marco A Jean PA-C    diazepam (VALIUM) tablet 5 mg, 5 mg, Oral, HS PRN, Marco A Jean PA-C, 5 mg at 11/28/17 2315    enoxaparin (LOVENOX) subcutaneous injection 40 mg, 40 mg, Subcutaneous, Daily, Marco A Jean PA-C, 40 mg at 11/29/17 0911    gabapentin (NEURONTIN) capsule 100 mg, 100 mg, Oral, TID, Marco A Jean PA-C, 100 mg at 11/29/17 2053    lactated ringers infusion, 20 mL/hr, Intravenous, Continuous, Zackary Dumont MD, Stopped at 11/28/17 1809    lactated ringers infusion, 50 mL/hr, Intravenous, Continuous, Humaira Fisher CRNA, Stopped at 11/27/17 1740    lactated ringers infusion, 1 5 mL/kg/hr, Intravenous, Continuous, Marco A Jean PA-C, Stopped at 11/28/17 4674    levothyroxine tablet 25 mcg, 25 mcg, Oral, Early Morning, Marco A Jean PA-C, 25 mcg at 11/30/17 0542    morphine injection 2 mg, 2 mg, Intravenous, Q3H PRN, Luis Jessica PA-C, 2 mg at 11/28/17 0857    nortriptyline (PAMELOR) capsule 35 mg, 35 mg, Oral, HS, Luis Jessica PA-C, 35 mg at 11/29/17 2100    ondansetron (ZOFRAN) injection 4 mg, 4 mg, Intravenous, Q6H PRN, Luis Jessica PA-C    oxyCODONE (ROXICODONE) immediate release tablet 10 mg, 10 mg, Oral, Q4H PRN, Luis Jessica PA-C, 10 mg at 11/30/17 0335    oxyCODONE (ROXICODONE) IR tablet 5 mg, 5 mg, Oral, Q4H PRN, Luis Jessica PA-C, 5 mg at 11/29/17 2053    pantoprazole (PROTONIX) EC tablet 40 mg, 40 mg, Oral, Early Morning, Luis Jessica PA-C, 40 mg at 11/30/17 0542    traMADol (ULTRAM) tablet 50 mg, 50 mg, Oral, Q6H [address], Luis Jessica PA-C, 50 mg at 11/30/17 0542    Blood Culture:   No results found for: BLOODCX    Wound Culture:   No results found for: WOUNDCULT    Ins and Outs:  I/O last 24 hours: In: 740 [P O :740]  Out: 2030 [Urine:2030]          Physical Exam:  Vitals:    11/29/17 2307   BP: 97/55   Pulse: 87   Resp: 19   Temp: 98 °F (36 7 °C)   SpO2: 98%     Left lower extremity:  · Dressings C/D/I  · Sensation intact s/s/sp/dp/t  · +ankle df/pf, ehl/fhl  · Toes WWP    _*_*_*_*_*_*_*_*_*_*_*_*_*_*_*_*_*_*_*_*_*_*_*_*_*_*_*_*_*_*_*_*_*_*_*_*_*_*_*_*_*    Assessment: 67 y  o female post operative day 3 left total hip arthroplasty with controlled pain       Plan:  · Weight Bearing as tolerated LLE  · Up and out of bed  · Total hip precautions posterior   · DVT prophylaxis  · Analgesics  · PT/OT  · JATIN  · D/C planning    Tigist Mcdonnell MD

## 2017-11-30 NOTE — DISCHARGE SUMMARY
ORTHOPEDICS DISCHARGE SUMMARY   Geetha Rivera 67 y o  female MRN: [id number]  Unit/Bed#: CW3 343-01      Attending Physician: Guy Washington    Admitting diagnosis: Primary osteoarthritis of left hip [M16 12]    Discharge diagnosis: Primary osteoarthritis of left hip [M16 12]    Date of admission: 11/27/2017    Date of discharge: 11/30/17    Procedure: left removal of femoral IMN, posterior JONATHAN    HPI  This is a 67y o  year old female that presented to the office with signs and symptoms of left hip osteoarthritis  They tried and failed conservative treatment measures and wished to proceed with surgical intervention  The risks, benefits, and complications of the procedure were discussed with the patient and informed consent was obtained  Hospital Course: The patient was admitted to the hospital on 11/27/2017 and underwent an uncomplicated left removal of IMN and posterior total hip arthroplasty  They were transferred to the floor after a brief stay in the post-anesthesia care unit  Their pain was well managed with IV and oral pain medications  They began therapy on post operative day #1  Lovenox was also started for DVT prophylaxis post operative day #1  Post operative course was uneventful  On discharge date pt was cleared by PT and the medicine team and determined to be safe for discharge  Daily discussion was had with the patient, nursing staff, orthopaedic team, and family members if present  All questions were answered to the patients satisifaction        0  Lab Value Date/Time   HGB 10 0 (L) 11/29/2017 0437   HGB 8 2 (L) 11/28/2017 0511   HGB 12 0 11/06/2017 0638   HGB 11 9 09/01/2017 0701   HGB 12 9 08/29/2017 2145   HGB 12 6 08/22/2017 1445   HGB 12 9 04/25/2017 0639   HGB 12 4 03/01/2016 0740   HGB 11 9 12/16/2014 0727   HGB 8 8 (L) 10/30/2014 0626   HGB 9 0 (L) 10/27/2014 0648   HGB 8 4 (L) 10/23/2014 0640   HGB 8 9 (L) 10/20/2014 0632   HGB 9 5 (L) 10/16/2014 0608   HGB 10 4 (L) 10/15/2014 0613   HGB 9 0 (L) 10/14/2014 0549   HGB 10 2 (L) 10/13/2014 0608   HGB 12 1 10/12/2014 0826        Discharge Instructions: The patient was discharged weight bearing as tolerated to the left lower extremity  Lovenox will be continued for 28 days  Continue PT/OT  Take pain medications as instructed  Maintain posterior hip precautions  Discharge Medications: For the complete list of discharge medications, please refer to the patient's medication reconciliation

## 2017-11-30 NOTE — PLAN OF CARE
Problem: OCCUPATIONAL THERAPY ADULT  Goal: Performs self-care activities at highest level of function for planned discharge setting  See evaluation for individualized goals  Treatment Interventions: ADL retraining, Endurance training, Patient/family training, Equipment evaluation/education, Continued evaluation, Activityengagement, Energy conservation          See flowsheet documentation for full assessment, interventions and recommendations  Outcome: Progressing  Limitation: Decreased ADL status, Decreased Safe judgement during ADL, Decreased cognition, Decreased endurance, Decreased self-care trans, Decreased high-level ADLs  Prognosis: Good  Assessment: Pt participated in am ot treatment session focusing on UB/LB bathing, UB/LB dressing, bed mobility, functional transfers, grooming, and LHAE education  Pt required Max A for LB dressing and bathing  Pt was educated and practiced with LHAE ie reacher and sock aide however required assistance while using the equipment  Pt required Max A for functional transfers  Pt required Max A x2 to pull up pants while in stance 2* to decreased balance and pain  Pt reported she "wants to go home"  Pt reported her sister will be home to help  Pt recalled 2/3 precautions with visual cues  Pt is recommended for short term rehab to improve transfers, LB dressing/bathing, strength, and activity tolerance  Pt provided and educated on total hip percautions  handout    Recommendation:  (defer at this time, will continue to follow/assess)    ERIN Murray

## 2017-11-30 NOTE — PLAN OF CARE
Problem: PHYSICAL THERAPY ADULT  Goal: Performs mobility at highest level of function for planned discharge setting  See evaluation for individualized goals  Treatment/Interventions: Continued evaluation  Equipment Recommended: Other (Comment) (pt owns a RW and SPC; ? BSC)       See flowsheet documentation for full assessment, interventions and recommendations  Outcome: Progressing  Prognosis: Good  Problem List: Decreased strength, Decreased range of motion, Impaired balance, Decreased endurance, Decreased mobility, Decreased coordination, Impaired tone, Decreased skin integrity, Orthopedic restrictions, Pain  Assessment: The pt  was able to ambulate short distances today, but she required assistance in order to maintain her balance and advance her LLE  She did have improving steps with subsequent trials, but she continues to have difficulty advancing her left leg  She was able to complete two stairs, but again she required assistance in order to safely elevate her foot onto the steps  Due to her limited progress to date, and the manual instruction and assistance necessary to safely mobilize, inpatient rehab is recommended at discharge  Barriers to Discharge: Inaccessible home environment, Decreased caregiver support  Barriers to Discharge Comments: VILMA, heavy A for OOB at this time  Recommendation: Post acute IP rehab     PT - OK to Discharge: Yes (to rehab when medically appropriate )    See flowsheet documentation for full assessment

## 2017-11-30 NOTE — PLAN OF CARE
Problem: PHYSICAL THERAPY ADULT  Goal: Performs mobility at highest level of function for planned discharge setting  See evaluation for individualized goals  Treatment/Interventions: Continued evaluation  Equipment Recommended: Other (Comment) (pt owns a RW and SPC; ? BSC)       See flowsheet documentation for full assessment, interventions and recommendations  Outcome: Progressing  Prognosis: Good  Problem List: Decreased strength, Decreased range of motion, Impaired balance, Decreased endurance, Decreased mobility, Decreased coordination, Impaired tone, Decreased skin integrity, Orthopedic restrictions, Pain  Assessment: The pt  has improved range of motion with therapeutic exercise, but she had significantly increased difficutly advancing either leg with ambulation  She required physical assistance in order to facilitate forward movement of her LLE for gait  She was able to stand for 3-5 minutes at a time with attempts at gait, but then she required to sit  She continues to remain unsafe to return home at this time  Will continue to follow  Barriers to Discharge: Inaccessible home environment, Decreased caregiver support  Barriers to Discharge Comments: VILMA, heavy A for OOB at this time  Recommendation: Post acute IP rehab     PT - OK to Discharge: Yes (to rehab when medically appropriate )    See flowsheet documentation for full assessment

## 2017-11-30 NOTE — OP NOTE
OPERATIVE REPORT  PATIENT NAME: Emanuel Rivera    :  1945  MRN: [id number]  Pt Location: BE OR ROOM 18    SURGERY DATE: 2017    Surgeon(s) and Role:     * Sandra Gu MD - Primary     * Dougie Gaston PA-C - Earleen Najjar, MD - Assisting    Preop Diagnosis:  Primary osteoarthritis of left hip [M16 12]    Post-Op Diagnosis Codes:     * Primary osteoarthritis of left hip [M16 12]    Procedure(s) (LRB):  REMOVAL IM NAIL CONVERSION TO TOTAL HIP ARTHROPLASTY POSTERIOR APPROACH (Left)    Specimen(s):  * No specimens in log *    Estimated Blood Loss:   350 mL    Drains:  [REMOVED] Urethral Catheter Latex 16 Fr  (Removed)   Removed 17    Site Assessment Clean;Skin intact 2017  6:09 PM   Collection Container Standard drainage bag 2017  6:09 PM   Securement Method Securing device (Describe) 2017  6:09 PM   Output (mL) 150 mL 2017  9:00 PM   Number of days: 1       Anesthesia Type:   General    Operative Indications:  Primary osteoarthritis of left hip [M16 12]  Status post open reduction internal fixation of left hip intertrochanteric fracture    Operative Findings:  Severe DJD left hip    Complications:   None    Procedure and Technique:  Removal of cephalad medullary nail from left hip followed by left total hip arthroplasty  This patient suffered an intertrochanteric hip fracture that was fixed many years ago at another institution  She subsequently developed arthritis in her left hip, which was not responsive to conservative measures  A course of physical therapy, cortisone injections, etc  Was used, to no avail  She was brought to the operating room for removal of hardware from her previous surgery and left total hip arthroplasty  Patient was administered a general anesthetic with tracheal intubation, placed in the lateral position, left side up, right side down, prepped and draped in usual sterile fashion for an operation on the left hip    The posterior lateral approach was used  Sharp dissection was carried out down to subcutaneous tissue to the fascia  The fascia was incised for the length of the wound  The bursa over the trochanter was carefully removed  The area of insertion of the lag screw was carefully exposed and the end of the lag screw was visualized  The extraction handle was applied and the lag screw was was turned in the GainSpan  After the Locking [place] Was Removed through the Top of the Cal  The Distal Interlocking Screw Was Visualized and Removed with the Appropriate Screwdriver  The Cal Was Next Removed by Extracting It through the Top of the Greater Trochanter  The Dissection Was Carried out down to the Hip Capsule and Short External Rotators  The Proximal Portion of the Short external rotators of the hip were visualized and the piriformis tendon was incised  Capsulotomy was carried out and the ends of the capsule were intact  The hip was dislocated and the femoral neck cut was made with the reciprocating saw  The proximal femur was retracted anteriorly to the acetabulum  The acetabulum was reamed starting with a 40 mm reamer in 1 mm increments to 44  The 44 trial was inserted and found to fit snug  The DePuy pedicle pore coated acetabular shell [place] size 44 mm outside diameter was inserted  It fit snugly  A single 25 mm screw was placed after the hole in the acetabulum was drilled  The Ultrex polyethylene neutral liner, 28 mm inside diameter was inserted  Attention was then drawn to the femoral side  The femoral neck retractor was inserted under the femoral neck  A box osteotome tome was used to cut out into the lateral aspect of the proximal femur  Curet was used to remove cancellus bone from the lateral wall  Because of the length of the cephalometric nail  It was felt that using the longer revision stem from the Corail system would be the most appropriate thing to use    This was done to bypass the most distal hole in the interlock system  The cutting broaches from the revision stem set were used starting with size 8, 9 and 10  The 10 seemed to fit appropriately placed  The standard offset neck was applied and a trial reduction was carried out with a 28 mm head  The hip was found to be stable in position of sleep  There was noted tendency toward dislocation anteriorly with external rotation  The knee flexed to 90  It could be internally rotated to 45 without disloc  ation  The provisionals were removed from the femoral canal  The Corail revision cementless Stem with collar, size 10 was inserted  The size 28 mm diameter and +1 5 neck Articuleeze femoral head was applied  The hip was reduced and the hip was once again found to be very stable with all the previous maneuvers mentioned  The wound was thoroughly irrigated with sterile saline solution via pulsating jet lavage  The wound was irrigated with dilute chlorhexidine solution, followed by sterile saline solution  The fascia was closed using 0 Vicryl in a figure-of-eight fashion  The subcutaneous tenderness tissue was closed using 2-0 Vicryl in inverted fashion  Dural dressing was applied  An abduction pillow was applied    The patient tolerated the procedure well and left the operating room in good condition                                                        I was present for the entire procedure    Patient Disposition:  PACU     SIGNATURE: Tigist Robledo MD  DATE: November 30, 2017  TIME: 12:57 PM

## 2017-11-30 NOTE — PHYSICAL THERAPY NOTE
Physical Therapy Progress Note     11/30/17 1425   Pain Assessment   Pain Assessment 0-10   Pain Score 6   Pain Type Surgical pain   Pain Location Hip   Pain Orientation Left   Hospital Pain Intervention(s) Cold applied;Repositioned; Ambulation/increased activity; Emotional support   Response to Interventions Tolerated  Restrictions/Precautions   RLE Weight Bearing Per Order WBAT   LLE Weight Bearing Per Order WBAT   Other Precautions THR;Pain; Fall Risk   Subjective   Subjective The pt  reports that she is upset that she can not go home  She was agreeable to walk with therapy, and after motivation she was agreeable to trial the stairs  She is hopeful to be able to go home  Bed Mobility   Supine to Sit 4  Minimal assistance   Additional items Assist x 1; Increased time required;Verbal cues;LE management   Sit to Supine 4  Minimal assistance   Additional items Assist x 1; Increased time required;Verbal cues;LE management   Transfers   Sit to Stand 4  Minimal assistance   Additional items Assist x 1; Increased time required;Verbal cues   Stand to Sit 5  Supervision   Additional items Increased time required;Verbal cues   Ambulation/Elevation   Gait pattern Excessively slow; Step to;Short stride; Inconsistent ora;Decreased foot clearance;Shuffling;L Foot drag   Gait Assistance 4  Minimal assist   Additional items Assist x 1;Verbal cues; Tactile cues   Assistive Device Rolling walker   Distance 10 feet, 15 feet, 5 feet, 8 feet  Stair Management Assistance 4  Minimal assist   Additional items Assist x 1;Verbal cues; Tactile cues; Increased time required   Stair Management Technique Two rails; Step to pattern; Foreward;Nonreciprocal   Number of Stairs 2  (2 ascending and 3 descending )   Balance   Static Sitting Good   Dynamic Sitting Fair   Static Standing Poor +   Ambulatory Poor +   Activity Tolerance   Activity Tolerance Patient tolerated treatment well;Patient limited by fatigue;Patient limited by pain   Nurse Made Gianni Vogel RN  Exercises   THR Supine;Left;AAROM;10 reps  (x2 sets )   Assessment   Prognosis Good   Problem List Decreased strength;Decreased range of motion; Impaired balance;Decreased endurance;Decreased mobility; Decreased coordination; Impaired tone;Decreased skin integrity;Orthopedic restrictions;Pain   Assessment The pt  was able to ambulate short distances today, but she required assistance in order to maintain her balance and advance her LLE  She did have improving steps with subsequent trials, but she continues to have difficulty advancing her left leg  She was able to complete two stairs, but again she required assistance in order to safely elevate her foot onto the steps  Due to her limited progress to date, and the manual instruction and assistance necessary to safely mobilize, inpatient rehab is recommended at discharge  Barriers to Discharge Inaccessible home environment;Decreased caregiver support   Goals   Patient Goals To go home  STG Expiration Date 12/06/17   Treatment Day 4   Plan   Treatment/Interventions Functional transfer training;LE strengthening/ROM; Therapeutic exercise;Elevations; Endurance training;Patient/family training;Bed mobility;Gait training   Progress Progressing toward goals   PT Frequency Twice a day   Recommendation   Recommendation Post acute IP rehab   Equipment Recommended Leandro Duran, PTA

## 2017-11-30 NOTE — PROGRESS NOTES
Internal Medicine Progress Note  Patient: Dao Aguilar Colon  Age/sex: 67 y o  female  Medical Record #: [id number]      ASSESSMENT/PLAN:  1  Left JONATHAN: pain control per primary team  Recommend IS hourly and bowel regimen to help prevent narcotic induced constipation  PT/OT  2  Acute blood loss anemia: Hgb improved to 10 0 s/p transfusion  3  Hypothyroidism: continue levothyroxine supplement  4  GERd: continue PPI; resume Pepcid on discharge  5  Hyponatremia: mild at 133; encourage PO intake   6  DVT prophylaxis: Lovenox 40mg daily  7  D/C planning: clear from medicine standpoint, per PT needs rehab      Subjective: Patient seen and examined  No new complaints overnight; slept well, no further dizziness/lightheadedness   Asking when she can go home  ROS:   GI: denies abdominal pain, change bowel habits or reflux symptoms  Neuro: No new neurologic changes  Respiratory: No Cough, SOB  Cardiovascular: No CP, palpitations   EXT: +L hip pain    Scheduled Meds:    enoxaparin 40 mg Subcutaneous Daily   gabapentin 100 mg Oral TID   levothyroxine 25 mcg Oral Early Morning   nortriptyline 35 mg Oral HS   pantoprazole 40 mg Oral Early Morning   traMADol 50 mg Oral Q6H [address]       Labs:       Results from last 7 days  Lab Units 11/29/17  0437 11/28/17  0511   WBC Thousand/uL 10 85* 8 42   HEMOGLOBIN g/dL 10 0* 8 2*   HEMATOCRIT % 30 5* 26 3*   PLATELETS Thousands/uL 196 222       Results from last 7 days  Lab Units 11/30/17  0453 11/29/17  0437   SODIUM mmol/L 133* 135*   POTASSIUM mmol/L 3 8 3 7   CHLORIDE mmol/L 99* 102   CO2 mmol/L 30 29   BUN mg/dL 11 13   CREATININE mg/dL 0 39* 0 42*   GLUCOSE RANDOM mg/dL 97 118   CALCIUM mg/dL 7 9* 7 7*                  Glucose (mg/dL)   Date Value   11/30/2017 97   11/29/2017 118   11/28/2017 108   09/01/2017 85   10/30/2014 89   10/27/2014 95   10/23/2014 88   10/20/2014 97       Labs reviewed    Physical Examination:  Vitals:   Vitals:    11/29/17 1605 11/29/17 2000 11/29/17 2307 11/30/17 0728   BP: 98/54  97/55 125/60   Pulse: 81  87 86   Resp: 18  19 20   Temp: 98 1 °F (36 7 °C)  98 °F (36 7 °C) 98 9 °F (37 2 °C)   TempSrc: Oral  Oral Oral   SpO2: 98% 96% 98% 97%   Weight:       Height:           GEN: NAD  HEENT: NC/AT, EOMI  RESP: CTAB, no R/R/W  CV: +S1 S2, regular rate, no rubs  ABD: soft, NT, ND, normal BS   : voiding without difficulty  EXT: 4/5 LLE strength; others normal  Skin:Incisional dressing intact; good cap refill  Neuro: Alert and Oriented x3; appropriate      [x ] Total time spent: 30 Mins and greater than 50% of this time was spent counseling/coordinating care        Tai Greer PA-C  Internal Medicine

## 2017-11-30 NOTE — OCCUPATIONAL THERAPY NOTE
Occupational Therapy     11/30/17 1005   Restrictions/Precautions   Weight Bearing Precautions Per Order Yes   RLE Weight Bearing Per Order WBAT   LLE Weight Bearing Per Order WBAT   Other Precautions THR; Fall Risk; Chair Alarm   Pain Assessment   Pain Assessment 0-10   Pain Score 7   Pain Type Surgical pain   Pain Location Hip   Pain Orientation Left   ADL   Where Assessed Chair   Grooming Assistance 5  Supervision/Setup   Grooming Deficit Supervision/safety;Setup; Teeth care   Grooming Comments pt brushed teeth while sitting in upright recliner  UB Bathing Assistance 5  Supervision/Setup   UB Bathing Deficit Setup   LB Bathing Assistance 2  Maximal Assistance   LB Bathing Deficit Buttocks;Right lower leg including foot; Left lower leg including foot   LB Bathing Comments pt able to wash  bilateral Upper legs   UB Dressing Assistance 5  Supervision/Setup   UB Dressing Deficit Setup   LB Dressing Assistance 2  Maximal Assistance   LB Dressing Deficit Thread LLE into pants; Thread RLE into pants; Don/doff L sock; Don/doff R sock; Increased time to complete;Supervision/safety;Use of adaptive equipment;Pull up over hips   LB Dressing Comments Pt able to assist with pulling up pants while sitting  Pt used LHAE with Max A  Pt required asissttance to maintain balance and assistance of a second person to pull up pants while in stance  Functional Standing Tolerance   Time pt stoof for 45 seconds to pull up pants with poor balance with rw   Bed Mobility   Supine to Sit 3  Moderate assistance   Additional items Assist x 2;LE management; Increased time required;Verbal cues   Transfers   Sit to Stand 2  Maximal assistance   Additional items Assist x 1   Stand to Sit 3  Moderate assistance   Additional items Assist x 1; Increased time required   Stand pivot 2  Maximal assistance   Additional items Assist x 1; Increased time required   Assessment   Assessment Pt participated in am ot treatment session focusing on UB/LB bathing, UB/LB dressing, bed mobility, functional transfers, grooming, and LHAE education  Pt required Max A for LB dressing and bathing  Pt was educated and practiced with LHAE ie reacher and sock aide however required assistance while using the equipment  Pt required Max A for functional transfers  Pt required Max A x2 to pull up pants while in stance 2* to decreased balance and pain  Pt reported she "wants to go home"  Pt reported her sister will be home to help  Pt recalled 2/3 precautions with visual cues  Pt is recommended for short term rehab to improve transfers, LB dressing/bathing, strength, and activity tolerance  Pt provided and educated on total hip percautions  handout     Plan   Treatment Interventions ADL retraining;Functional transfer training;Equipment evaluation/education   Goal Expiration Date 12/09/17   OT Frequency 3-5x/wk   Bart Lay

## 2017-12-01 VITALS
RESPIRATION RATE: 18 BRPM | WEIGHT: 125.88 LBS | HEART RATE: 84 BPM | DIASTOLIC BLOOD PRESSURE: 67 MMHG | TEMPERATURE: 98 F | BODY MASS INDEX: 23.17 KG/M2 | SYSTOLIC BLOOD PRESSURE: 108 MMHG | HEIGHT: 62 IN | OXYGEN SATURATION: 97 %

## 2017-12-01 PROCEDURE — 97112 NEUROMUSCULAR REEDUCATION: CPT

## 2017-12-01 PROCEDURE — 97110 THERAPEUTIC EXERCISES: CPT

## 2017-12-01 PROCEDURE — 97116 GAIT TRAINING THERAPY: CPT

## 2017-12-01 PROCEDURE — 97530 THERAPEUTIC ACTIVITIES: CPT

## 2017-12-01 RX ADMIN — OXYCODONE HYDROCHLORIDE 10 MG: 10 TABLET ORAL at 12:12

## 2017-12-01 RX ADMIN — TRAMADOL HYDROCHLORIDE 50 MG: 50 TABLET, FILM COATED ORAL at 12:12

## 2017-12-01 RX ADMIN — PANTOPRAZOLE SODIUM 40 MG: 40 TABLET, DELAYED RELEASE ORAL at 06:21

## 2017-12-01 RX ADMIN — OXYCODONE HYDROCHLORIDE 10 MG: 10 TABLET ORAL at 03:41

## 2017-12-01 RX ADMIN — TRAMADOL HYDROCHLORIDE 50 MG: 50 TABLET, FILM COATED ORAL at 06:21

## 2017-12-01 RX ADMIN — OXYCODONE HYDROCHLORIDE 5 MG: 5 TABLET ORAL at 14:55

## 2017-12-01 RX ADMIN — GABAPENTIN 100 MG: 100 CAPSULE ORAL at 08:27

## 2017-12-01 RX ADMIN — ENOXAPARIN SODIUM 40 MG: 40 INJECTION SUBCUTANEOUS at 08:27

## 2017-12-01 RX ADMIN — LEVOTHYROXINE SODIUM 25 MCG: 25 TABLET ORAL at 06:21

## 2017-12-01 RX ADMIN — OXYCODONE HYDROCHLORIDE 10 MG: 10 TABLET ORAL at 08:27

## 2017-12-01 NOTE — SOCIAL WORK
CM received a message through [facility] from [facility] , stating they can accept the patient  ALAYNA arranged transportation with [facility] for 2:30  Patient, family, nursing, facility and physician made aware of transport time

## 2017-12-01 NOTE — PHYSICAL THERAPY NOTE
Physical Therapy Progress Note     12/01/17 1010   Pain Assessment   Pain Assessment 0-10   Pain Score 6   Pain Type Surgical pain   Pain Location Hip   Pain Orientation Left   Hospital Pain Intervention(s) Cold applied;Repositioned; Ambulation/increased activity; Emotional support   Response to Interventions Tolerated  Restrictions/Precautions   LLE Weight Bearing Per Order WBAT   Other Precautions THR;Pain; Fall Risk   Subjective   Subjective The pt  states that she has a lot of pain, but that it is improved after therapeutic exercise  She notes that she is making progress, but that it is coming slowly  Bed Mobility   Supine to Sit 3  Moderate assistance   Additional items Assist x 1; Increased time required;Verbal cues;LE management   Transfers   Sit to Stand 4  Minimal assistance   Additional items Assist x 1; Increased time required   Stand to Sit 4  Minimal assistance   Additional items Assist x 1; Increased time required;Verbal cues   Stand pivot 4  Minimal assistance   Additional items Assist x 1; Increased time required;Verbal cues   Ambulation/Elevation   Gait pattern Excessively slow; Step to;Short stride; Inconsistent ora; Shuffling;Decreased foot clearance; Antalgic   Gait Assistance 4  Minimal assist   Additional items Assist x 1;Verbal cues   Assistive Device Rolling walker   Distance 8 feet x2  Balance   Static Sitting Good   Static Standing Fair -   Ambulatory Poor +   Activity Tolerance   Activity Tolerance Patient tolerated treatment well;Patient limited by fatigue;Patient limited by pain   Exercises   THR Supine;Sitting;Left;AAROM;15 reps  (x2 sets )   Assessment   Prognosis Good   Problem List Decreased strength;Decreased range of motion; Impaired balance;Decreased endurance;Decreased mobility; Decreased coordination; Impaired tone;Decreased skin integrity;Orthopedic restrictions;Pain   Assessment The pt  was able to take several steps today, but she continues to require extended time to ambulate as well as assistance  She also required 3 minutes in order to maintain her static balance as she was retropulsive initially  She does have improving range of motion with therapeutic exercise as well as decreased pain with activity  She does continue to require instruction for safety and technique with mobility  Due to this and her limited mobility at this time, inpatient rehab is recommended in order to safely progress her back to her baseline independence  Barriers to Discharge Inaccessible home environment;Decreased caregiver support   Goals   Patient Goals To get stronger, and to have less pain  STG Expiration Date 12/06/17   Treatment Day 5   Plan   Treatment/Interventions Functional transfer training;LE strengthening/ROM; Elevations; Therapeutic exercise; Endurance training;Patient/family training;Bed mobility;Gait training   Progress Progressing toward goals   PT Frequency Twice a day   Recommendation   Recommendation Post acute IP rehab   Equipment Recommended Asya Lopez PTA

## 2017-12-01 NOTE — PROGRESS NOTES
Internal Medicine Progress Note  Patient: Ayush Olvera Colon  Age/sex: 67 y o  female  Medical Record #: [id number]      ASSESSMENT/PLAN:  1  Left JONATHAN: pain control per primary team  Recommend IS hourly and bowel regimen to help prevent narcotic induced constipation  PT/OT  2  Acute blood loss anemia: Hgb improved to 10 0 s/p transfusion  3  Hypothyroidism: continue levothyroxine supplement  4  GERd: continue PPI; resume Pepcid on discharge  5  Hyponatremia: mild at 133; encourage PO intake   6  DVT prophylaxis: Lovenox 40mg daily  7  D/C planning: clear from medicine standpoint, per PT needs rehab      Subjective: Patient seen and examined  Patient reports pain in the left hip which does improve with pain medication  She is urinating without difficulty  She has not yet had a bowel movement  Patient denies any other complaints      ROS:   GI: denies abdominal pain, change bowel habits or reflux symptoms  Neuro: No new neurologic changes  Respiratory: No Cough, SOB  Cardiovascular: No CP, palpitations   EXT: +L hip pain    Scheduled Meds:    enoxaparin 40 mg Subcutaneous Daily   gabapentin 100 mg Oral TID   levothyroxine 25 mcg Oral Early Morning   nortriptyline 35 mg Oral HS   pantoprazole 40 mg Oral Early Morning   traMADol 50 mg Oral Q6H [address]       Labs:       Results from last 7 days  Lab Units 11/29/17  0437 11/28/17  0511   WBC Thousand/uL 10 85* 8 42   HEMOGLOBIN g/dL 10 0* 8 2*   HEMATOCRIT % 30 5* 26 3*   PLATELETS Thousands/uL 196 222       Results from last 7 days  Lab Units 11/30/17  0453 11/29/17  0437   SODIUM mmol/L 133* 135*   POTASSIUM mmol/L 3 8 3 7   CHLORIDE mmol/L 99* 102   CO2 mmol/L 30 29   BUN mg/dL 11 13   CREATININE mg/dL 0 39* 0 42*   GLUCOSE RANDOM mg/dL 97 118   CALCIUM mg/dL 7 9* 7 7*                  Glucose (mg/dL)   Date Value   11/30/2017 97   11/29/2017 118   11/28/2017 108   09/01/2017 85   10/30/2014 89   10/27/2014 95   10/23/2014 88   10/20/2014 97       Labs reviewed    Physical Examination:  Vitals:   Vitals:    11/30/17 2250 12/01/17 0000 12/01/17 0558 12/01/17 0700   BP: 101/56   108/67   Pulse: 85   84   Resp: 18   18   Temp: 98 °F (36 7 °C)   98 °F (36 7 °C)   TempSrc: Oral   Oral   SpO2: 92% 93%  97%   Weight:   57 1 kg (125 lb 14 1 oz)    Height:           GEN: NAD  HEENT: NC/AT, EOMI  RESP: CTAB, no R/R/W  CV: +S1 S2, regular rate, no rubs  ABD: soft, NT, ND, normal BS   : voiding without difficulty  EXT: 4/5 LLE strength; others normal  Skin:Incisional dressing intact; good cap refill  Neuro: Alert and Oriented x3; appropriate      [ X ] Total time spent: 30 Mins and greater than 50% of this time was spent counseling/coordinating care        Andres Perdomo PA-C  Internal Medicine

## 2017-12-01 NOTE — PLAN OF CARE
Problem: PHYSICAL THERAPY ADULT  Goal: Performs mobility at highest level of function for planned discharge setting  See evaluation for individualized goals  Treatment/Interventions: Continued evaluation  Equipment Recommended: Other (Comment) (pt owns a RW and SPC; ? BSC)       See flowsheet documentation for full assessment, interventions and recommendations  Outcome: Progressing  Prognosis: Good  Problem List: Decreased strength, Decreased range of motion, Impaired balance, Decreased endurance, Decreased mobility, Decreased coordination, Impaired tone, Decreased skin integrity, Orthopedic restrictions, Pain  Assessment: The pt  was able to take several steps today, but she continues to require extended time to ambulate as well as assistance  She also required 3 minutes in order to maintain her static balance as she was retropulsive initially  She does have improving range of motion with therapeutic exercise as well as decreased pain with activity  She does continue to require instruction for safety and technique with mobility  Due to this and her limited mobility at this time, inpatient rehab is recommended in order to safely progress her back to her baseline independence  Barriers to Discharge: Inaccessible home environment, Decreased caregiver support  Barriers to Discharge Comments: VILMA, heavy A for OOB at this time  Recommendation: Post acute IP rehab     PT - OK to Discharge: Yes (to rehab when medically appropriate )    See flowsheet documentation for full assessment

## 2017-12-01 NOTE — DISCHARGE SUMMARY
ORTHOPEDICS DISCHARGE SUMMARY   Geetha Rivera 67 y o  female MRN: [id number]  Unit/Bed#: CW3 343-01      Attending Physician: Malorie Cruz    Admitting diagnosis: Primary osteoarthritis of left hip [M16 12]    Discharge diagnosis: Primary osteoarthritis of left hip [M16 12]    Date of admission: 11/27/2017    Date of discharge: 12/01/17    Procedure: left removal of femoral IMN, posterior JONATHAN    HPI  This is a 67y o  year old female that presented to the office with signs and symptoms of left hip osteoarthritis  They tried and failed conservative treatment measures and wished to proceed with surgical intervention  The risks, benefits, and complications of the procedure were discussed with the patient and informed consent was obtained  Hospital Course: The patient was admitted to the hospital on 11/27/2017 and underwent an uncomplicated left removal of IMN and posterior total hip arthroplasty  They were transferred to the floor after a brief stay in the post-anesthesia care unit  Their pain was well managed with IV and oral pain medications  They began therapy on post operative day #1  Lovenox was also started for DVT prophylaxis post operative day #1  Post operative course was uneventful  On discharge date pt was cleared by PT and the medicine team and determined to be safe for discharge  Daily discussion was had with the patient, nursing staff, orthopaedic team, and family members if present  All questions were answered to the patients satisifaction        0  Lab Value Date/Time   HGB 10 0 (L) 11/29/2017 0437   HGB 8 2 (L) 11/28/2017 0511   HGB 12 0 11/06/2017 0638   HGB 11 9 09/01/2017 0701   HGB 12 9 08/29/2017 2145   HGB 12 6 08/22/2017 1445   HGB 12 9 04/25/2017 0639   HGB 12 4 03/01/2016 0740   HGB 11 9 12/16/2014 0727   HGB 8 8 (L) 10/30/2014 0626   HGB 9 0 (L) 10/27/2014 0648   HGB 8 4 (L) 10/23/2014 0640   HGB 8 9 (L) 10/20/2014 0632   HGB 9 5 (L) 10/16/2014 0608   HGB 10 4 (L) 10/15/2014 0613   HGB 9 0 (L) 10/14/2014 0549   HGB 10 2 (L) 10/13/2014 0608   HGB 12 1 10/12/2014 0826        Discharge Instructions: The patient was discharged weight bearing as tolerated to the left lower extremity  Lovenox will be continued for 28 days  Continue PT/OT  Take pain medications as instructed  Maintain posterior hip precautions  Discharge Medications: For the complete list of discharge medications, please refer to the patient's medication reconciliation

## 2017-12-05 ENCOUNTER — HOSPITAL ENCOUNTER (OUTPATIENT)
Dept: RADIOLOGY | Facility: HOSPITAL | Age: 72
Discharge: HOME | End: 2017-12-05
Attending: ORTHOPAEDIC SURGERY
Payer: COMMERCIAL

## 2017-12-05 ENCOUNTER — GENERIC CONVERSION - ENCOUNTER (OUTPATIENT)
Dept: OTHER | Facility: OTHER | Age: 72
End: 2017-12-05

## 2017-12-05 DIAGNOSIS — Z47.89 ENCOUNTER FOR OTHER ORTHOPEDIC AFTERCARE (CODE): ICD-10-CM

## 2017-12-05 PROCEDURE — 73502 X-RAY EXAM HIP UNI 2-3 VIEWS: CPT

## 2017-12-12 ENCOUNTER — GENERIC CONVERSION - ENCOUNTER (OUTPATIENT)
Dept: OTHER | Facility: OTHER | Age: 72
End: 2017-12-12

## 2017-12-21 ENCOUNTER — ALLSCRIPTS OFFICE VISIT (OUTPATIENT)
Dept: OTHER | Facility: OTHER | Age: 72
End: 2017-12-21

## 2018-01-11 ENCOUNTER — GENERIC CONVERSION - ENCOUNTER (OUTPATIENT)
Dept: OTHER | Facility: OTHER | Age: 73
End: 2018-01-11

## 2018-01-11 ENCOUNTER — HOSPITAL ENCOUNTER (OUTPATIENT)
Dept: RADIOLOGY | Facility: HOSPITAL | Age: 73
Discharge: HOME | End: 2018-01-11
Attending: ORTHOPAEDIC SURGERY
Payer: MEDICARE

## 2018-01-11 DIAGNOSIS — Z47.89 ENCOUNTER FOR OTHER ORTHOPEDIC AFTERCARE (CODE): ICD-10-CM

## 2018-01-11 PROCEDURE — 73502 X-RAY EXAM HIP UNI 2-3 VIEWS: CPT

## 2018-01-12 VITALS
TEMPERATURE: 98.3 F | OXYGEN SATURATION: 98 % | BODY MASS INDEX: 19.45 KG/M2 | HEIGHT: 61 IN | SYSTOLIC BLOOD PRESSURE: 150 MMHG | DIASTOLIC BLOOD PRESSURE: 90 MMHG | HEART RATE: 78 BPM | RESPIRATION RATE: 20 BRPM | WEIGHT: 103 LBS

## 2018-01-12 NOTE — PROGRESS NOTES
Preliminary Nursing Report                Patient Information    Initial Encounter Entry Date:   2017 10:59 AM EST (Automated Transmission Automated Transmission)       Last Modified:   {Tej Crain}              Legal Name: Selina Lee Number:        YOB: 1945        Age (years): 67        Gender: F        Body Mass Index (BMI): 19 kg/m2        Height: 61 in  Weight: 103 lbs (47 kgs)           Address:   [place] [id number]               Phone: [phone]   (consent to leave messages)        Email:        Ethnicity: Decline to State        Yazidi:        Marital Status:        Preferred Language: English        Race: Other Race                    Patient Insurance Information        Primary Insurance Information Carrier Name: {Primary  CarrierName}           Carrier Address:   {Primary  CarrierAddress}              Carrier Phone: {Primary  CarrierPhone}          Group Number: {Primary  GroupNumber}          Policy Number: {Primary  PolicyNumber}          Insured Name: {Primary  InsuredName}          Insured : {Primary  InsuredDOB}          Relationship to Insured: {Primary  RelationshiptoInsured}           Secondary Insurance Information Carrier Name: {Secondary  CarrierName}           Carrier Address:   {Secondary  CarrierAddress}              Carrier Phone: {Secondary  CarrierPhone}          Group Number: {Secondary  GroupNumber}          Policy Number: {Secondary  PolicyNumber}          Insured Name: {Secondary  InsuredName}          Insured : {Secondary  InsuredDOB}          Relationship to Insured: {Secondary  RelationshiptoInsured}                       Health Profile   Booking #:   Esteban Bruno #: [id number]               DOS: 2017    Surgery : TOTAL HIP REPLACEMENT      Add'l Procedures/Notes:     Surgery Risk: Intermediate          Precautions     Peripheral vascular disease                   Allergies    No Known Drug Allergies             Medications    Alendronate Sodium 70 MG Oral Tablet       Centrum Silver Oral Tablet       DiazePAM 5 MG Oral Tablet       Famotidine 20 MG Oral Tablet       Folic Acid 1 MG Oral Tablet       Gabapentin 100 MG Oral Capsule       Ibuprofen 600 MG Oral Tablet       Levothyroxine Sodium 25 MCG Oral Tablet       Nortriptyline HCl - 10 MG Oral Capsule       Nortriptyline HCl - 25 MG Oral Capsule       OxyCODONE HCl - 5 MG Oral Tablet       Vitamin C Plus 1000 MG Oral Tablet       Vitamin D 1000 UNIT Oral Tablet       Vitamin E 400 UNIT Oral Tablet               Conditions    Aftercare following surgery of the musculoskeletal system       Anxiety       Closed fracture of femur, neck       Counseling for sexually transmitted disease       Elevated alkaline phosphatase level       Encounter for screening colonoscopy       Encounter for screening mammogram for breast cancer       GERD (gastroesophageal reflux disease)       Hair loss       Hospital discharge follow-up       Hypothyroidism       Influenza vaccine needed       Left hip pain       Need for vaccination with 13-polyvalent pneumococcal conjugate vaccine       Osteoporosis       Peripheral vascular disease       Preop examination       Primary localized osteoarthritis of left hip       RBBB       Screening for osteoporosis       Urinary tract infection due to Proteus               Family History    None             Surgical History    None             Social History    Always uses seat belt       Caffeine use       Denies Drug use       Never a smoker       No alcohol use       Non-smoker       One child       Retired       [employer]       Single                               Patient Instructions       Medical Procedure Risk  NPO Instructions   The day before surgery it is recommended to have a light dinner at your usual time and you are allowed a light snack early in the evening   Do not eat anything heavy or eat a big meal after 7pm  Do not eat or drink anything after midnight prior to your surgery  If you are supposed to take any of your medications, do so with a sip of water  Failure to follow these instructions can lead to an increased risk of lung complications and may result in a delay or cancellation of your procedure  If you have any questions, contact your institution for further instructions  No candy, no gum, no mints, no chewing tobacco          DiazePAM 5 MG Oral Tablet  Medication Instruction (Benzodiazepine) 15  Please continue the following medications, if needed, up to and including the day of surgery (with a sip of water)  Famotidine 20 MG Oral Tablet  Medication Instruction (Antacids) 34, 35  Please continue to take this medication on your normal schedule  If this is an oral medication and you take in the morning, you may do so with a sip of water  Gabapentin 100 MG Oral Capsule  Medication Instruction (Neurological Medication) 8  Please continue to take this medication on your normal schedule  If this is an oral medication and you take in the morning, you may do so with a sip of water  Ibuprofen 600 MG Oral Tablet  Medication Instruction (NSAID - Pain Medication) 61  Please stop ibuprofen, naproxen and other non-steroidal anti-inflammatory drugs (NSAIDS) for 24 hrs before surgery  OxyCODONE HCl - 5 MG Oral Tablet  Medication Instruction (Opioids - Pain Medication) 62  Please continue the following medications, if needed, up to and including the day of surgery (with a sip of water)  Levothyroxine Sodium 25 MCG Oral Tablet  Medication Instruction (Thyroxine - Synthroid)   Please continue to take this medication on your normal schedule  If this is an oral medication and you take in the morning, you may do so with a sip of water  Testing Considerations       ?  Complete Blood Count (CBC) t, client, client  If test was completed and normal within last six months, repeat test is not necessary  Triggered by: Peripheral vascular disease, RBBB, Age or Facility Rec         ? Comprehensive Metabolic Panel (CMP) t  If test was completed and normal within last six months, repeat test is not necessary  Triggered by: Peripheral vascular disease, RBBB         ? Electrocardiogram (ECG) t  Patient does not need new test if normal ECG is present within the last six months and no change in clinical condition  Triggered by: Peripheral vascular disease, RBBB, Age or Facility Rec         ? Hemoglobin A1c (HbA1c) client  If test was completed and normal within the last three months, repeat test is not necessary  Triggered by: Age or Facility Rec         ? Type and Screen client  Type and Screen - Blood: If there is anticipated or possible large blood loss with this procedure, then a Type and Screen for Blood should be ordered  Triggered by: Age or Facility Rec         ? Urinalysis t  Urinalysis may be appropriate if recent urinary symptoms or implants are being placed in surgical procedure  Triggered by: Urinary tract infection due to Proteus               Consultations       ? Primary Care Physician Evaluation   Primary care physician may need to evaluate patient prior to surgery  This is likely NOT necessary if the listed conditions are chronic and stable  Triggered by: Peripheral vascular disease               Miscellaneous Questions         Question: Are you able to walk up a flight of stairs, walk up a hill or do heavy housework WITHOUT having chest pain or shortness of breath? Answer: YES                   Allergies/Conditions/Medications Not Found        The following were not recognized by our system when generating the recommendations  Please consider if this would impact any preoperative protocols  ? Always uses seat belt       ? Caffeine use       ? Encounter for screening mammogram for breast cancer       ? Never a smoker       ? No alcohol use       ? Non-smoker       ?  One child ? Retired       ? [facility]       ? Screening for osteoporosis       ? Single       ? Alendronate Sodium 70 MG Oral Tablet       ? Folic Acid 1 MG Oral Tablet       ? Nortriptyline HCl - 10 MG Oral Capsule       ? Nortriptyline HCl - 25 MG Oral Capsule                  Appointment Information         Date:    11/27/2017        Location:    [facility]        Address:           Directions:                      Footnotes revision 14      ?? Denotes a free-text entry  Legal Disclaimer: Any and all recommendations and services provided herein are designed to assist in the preoperative care of the patient  Nothing contained herein is designed to replace, eliminate or alleviate the responsibility of the attending physician to supervise and determine the patient?s preoperative care and course of treatment  Failure to provide complete, accurate information may negatively impact the system?s ability to recommend the proper preoperative protocol  THE ATTENDING PHYSICIAN IS RESPONSIBLE TO REVIEW THE SUGGESTED PREOPERATIVE PROTOCOLS/COURSE OF TREATMENT AND PRESCRIBE THE FINAL COURSE OF PREOPERATIVE TREATMENT IN CONSULTATION WITH THE PATIENT  THE ePREOP SYSTEM AND ITS MATERIALS ARE PROVIDED ? AS IS? WITHOUT WARRANTY OF ANY KIND, EXPRESS OR IMPLIED, INCLUDING, BUT NOT LIMITED TO, WARRANTIES OF PERFORMANCE OR MERCHANTABILITY OR FITNESS FOR A PARTICULAR PURPOSE  PATIENT AND PHYSICIANS HEREBY AGREE THAT THEIR USE OF THE MATERIALS AND RESOURCES ACT AS A CONSENT TO RELEASE AND WAIVE ePREOP FROM ANY AND ALL CLAIMS OF WARRANTY, TORT OR CONTRACT LAW OF ANY KIND

## 2018-01-13 VITALS
SYSTOLIC BLOOD PRESSURE: 136 MMHG | BODY MASS INDEX: 20.22 KG/M2 | WEIGHT: 103 LBS | DIASTOLIC BLOOD PRESSURE: 79 MMHG | HEIGHT: 60 IN | HEART RATE: 78 BPM

## 2018-01-13 VITALS
DIASTOLIC BLOOD PRESSURE: 83 MMHG | HEIGHT: 59 IN | WEIGHT: 105 LBS | BODY MASS INDEX: 21.17 KG/M2 | RESPIRATION RATE: 16 BRPM | HEART RATE: 83 BPM | OXYGEN SATURATION: 97 % | SYSTOLIC BLOOD PRESSURE: 110 MMHG | TEMPERATURE: 98 F

## 2018-01-13 VITALS
HEART RATE: 79 BPM | HEIGHT: 60 IN | BODY MASS INDEX: 20.62 KG/M2 | WEIGHT: 105 LBS | DIASTOLIC BLOOD PRESSURE: 82 MMHG | SYSTOLIC BLOOD PRESSURE: 158 MMHG

## 2018-01-13 NOTE — RESULT NOTES
Verified Results  (1) CBC/PLT/DIFF 25Apr2017 06:39AM [employer] Order Number: [id number]     Test Name Result Flag Reference   WBC COUNT 6 50 Thousand/uL  4 31-10 16   RBC COUNT 4 65 Million/uL  3 81-5 12   HEMOGLOBIN 12 9 g/dL  11 5-15 4   HEMATOCRIT 41 0 %  34 8-46  1   MCV 88 fL  82-98   MCH 27 7 pg  26 8-34 3   MCHC 31 5 g/dL  31 4-37 4   RDW 14 4 %  11 6-15 1   MPV 9 2 fL  8 9-12 7   PLATELET COUNT 414 Thousands/uL  149-390   nRBC AUTOMATED 0 /100 WBCs     NEUTROPHILS RELATIVE PERCENT 65 %  43-75   LYMPHOCYTES RELATIVE PERCENT 24 %  14-44   MONOCYTES RELATIVE PERCENT 10 %  4-12   EOSINOPHILS RELATIVE PERCENT 1 %  0-6   BASOPHILS RELATIVE PERCENT 0 %  0-1   NEUTROPHILS ABSOLUTE COUNT 4 19 Thousands/? ??L  1 85-7 62   LYMPHOCYTES ABSOLUTE COUNT 1 56 Thousands/? ??L  0 60-4 47   MONOCYTES ABSOLUTE COUNT 0 67 Thousand/? ??L  0 17-1 22   EOSINOPHILS ABSOLUTE COUNT 0 06 Thousand/? ??L  0 00-0 61   BASOPHILS ABSOLUTE COUNT 0 01 Thousands/? ??L  0 00-0 10   - Patient Instructions: This bloodwork is non-fasting  Please drink two glasses of water morning of bloodwork  (1) TSH WITH FT4 REFLEX 25Apr2017 06:39AM [employer] Order Number: [id number]     Test Name Result Flag Reference   TSH 4 920 uIU/mL H 0 358-3 740   Patients undergoing fluorescein dye angiography may retain small amounts of fluorescein in the body for 48-72 hours post procedure  Samples containing fluorescein can produce falsely depressed TSH values  If the patient had this procedure,a specimen should be resubmitted post fluorescein clearance            The recommended reference ranges for TSH during pregnancy are as follows:  First trimester 0 1 to 2 5 uIU/mL  Second trimester  0 2 to 3 0 uIU/mL  Third trimester 0 3 to 3 0 uIU/m   T4,FREE 0 86 ng/dL  0 76-1 46     (1) LIPID PANEL FASTING W DIRECT LDL REFLEX 25Apr2017 06:39AM [employer] Order Number: [id number]     Test Name Result Flag Reference   CHOLESTEROL 242 mg/dL H    LDL CHOLESTEROL CALCULATED 125 mg/dL H 0-100   - Patient Instructions: This is a fasting blood test  Water, black tea or black coffee only after 9:00pm the night before test   Drink 2 glasses of water the morning of test       Triglyceride:         Normal              <150 mg/dl       Borderline High    150-199 mg/dl       High               200-499 mg/dl       Very High          >499 mg/dl  Cholesterol:         Desirable        <200 mg/dl      Borderline High  200-239 mg/dl      High             >239 mg/dl  HDL Cholesterol:        High    >59 mg/dL      Low     <41 mg/dL  LDL Cholesterol:        Optimal          <100 mg/dl        Near Optimal     100-129 mg/dl        Above Optimal          Borderline High   130-159 mg/dl          High              160-189 mg/dl          Very High        >189 mg/dl  LDL CALCULATED:    This screening LDL is a calculated result  It does not have the accuracy of the Direct Measured LDL in the monitoring of patients with hyperlipidemia and/or statin therapy  Direct Measure LDL (YPF272) must be ordered separately in these patients  TRIGLYCERIDES 94 mg/dL  <=150   Specimen collection should occur prior to N-Acetylcysteine or Metamizole administration due to the potential for falsely depressed results  HDL,DIRECT 98 mg/dL H 40-60   Specimen collection should occur prior to Metamizole administration due to the potential for falsely depressed results       (1) COMPREHENSIVE METABOLIC PANEL [id number] 40:59GO [facility] Order Number: [id number]     Test Name Result Flag Reference   SODIUM 144 mmol/L  136-145   POTASSIUM 4 3 mmol/L  3 5-5 3   CHLORIDE 106 mmol/L  100-108   CARBON DIOXIDE 30 mmol/L  21-32   ANION GAP (CALC) 8 mmol/L  4-13   BLOOD UREA NITROGEN 10 mg/dL  5-25   CREATININE 0 55 mg/dL L 0 60-1 30   Standardized to IDMS reference method   CALCIUM 9 0 mg/dL  8 3-10 1   BILI, TOTAL 0 25 mg/dL  0 20-1 00   ALK PHOSPHATAS 130 U/L H    ALT (SGPT) 24 U/L  12-78   AST(SGOT) 15 U/L  5-45   ALBUMIN 3 4 g/dL L 3 5-5 0   TOTAL PROTEIN 7 0 g/dL  6 4-8 2   eGFR Non-African American      >60 0 ml/min/1 73sq m   [facility] Disease Education Program recommendations are as follows:  GFR calculation is accurate only with a steady state creatinine  Chronic Kidney disease less than 60 ml/min/1 73 sq  meters  Kidney failure less than 15 ml/min/1 73 sq  meters     GLUCOSE FASTING 83 mg/dL  65-99       Signatures   Electronically signed by : Ignacio Nissen, M D ; Apr 25 2017 11:49AM EST                       (Author)

## 2018-01-15 VITALS
DIASTOLIC BLOOD PRESSURE: 88 MMHG | BODY MASS INDEX: 20.62 KG/M2 | WEIGHT: 105 LBS | RESPIRATION RATE: 20 BRPM | HEIGHT: 60 IN | SYSTOLIC BLOOD PRESSURE: 120 MMHG | HEART RATE: 72 BPM | OXYGEN SATURATION: 99 % | TEMPERATURE: 98.5 F

## 2018-01-15 NOTE — MISCELLANEOUS
Assessment    1  Hospital discharge follow-up (V67 59) (Z09)   2  Urinary tract infection due to Proteus (599 0,041 6) (N39 0,B96 4)    Discussion/Summary  Discussion Summary:   From medical standpoint she is doing well and stable, UTI resolved at the completion of antibiotics, had normal urinalysis done yesterday  surgery was postponed due to her recent UTI hospitalization, but now that this is resolved she can be re-scheduled for her hip surgery  Counseling Documentation With Imm: The patient was counseled regarding impressions  Medication SE Review and Pt Understands Tx: The treatment plan was reviewed with the patient/guardian  The patient/guardian understands and agrees with the treatment plan      Chief Complaint  Chief Complaint Free Text Note Form: alondra      History of Present Illness  TCM Communication [facility]: The patient is being contacted for follow-up after hospitalization and ALONDRA contact made on 9/5/2017  She was hospitalized at [facility]  The date of admission: 8/29/2017, date of discharge: 9/1/2017  Diagnosis: UTI  She was discharged to home  She scheduled a follow up appointment  The patient is currently asymptomatic  Counseling was provided to the patient  Communication performed and completed by Georgiana Barrios   HPI: Pt presents for hospital follow up  Finished abx 2 days ago  she feels well, no sxs since DC  She was admitted from 8/29 to 9/1 for dizziness/vertigo and UTI  She was DC on cefdinir x 7 days and meclizine PRN, she has been taking meclizine every Am and has been dizziness free  She had a urinalysis done yesterday which was clean and normal results  she is now to be reschedule for her hip surgery by [facility] was for UTI and symptoms resolved  urine culture Proteus mirabilis more than 100,000 colonies sensitive to cefazolin     during hospitalization she maintained good kidney function with a creatinine of 0 63        Review of Systems  Complete-Female:   Constitutional: No fever, no chills, feels well, no tiredness, no recent weight gain or weight loss  Cardiovascular: No complaints of slow heart rate, no fast heart rate, no chest pain, no palpitations, no leg claudication, no lower extremity edema  Respiratory: No complaints of shortness of breath, no wheezing, no cough, no SOB on exertion, no orthopnea, no PND  Gastrointestinal: No complaints of abdominal pain, no constipation, no nausea or vomiting, no diarrhea, no bloody stools  Genitourinary: No complaints of dysuria, no incontinence, no pelvic pain, no dysmenorrhea, no vaginal discharge or bleeding  Active Problems    1  Aftercare following surgery of the musculoskeletal system (V58 78) (Z47 89)   2  Anxiety (300 00) (F41 9)   3  Closed fracture of femur, neck (820 8) (S72 009A)   4  Counseling for sexually transmitted disease (V65 45) (Z70 8)   5  Elevated alkaline phosphatase level (790 5) (R74 8)   6  Encounter for screening colonoscopy (V76 51) (Z12 11)   7  Encounter for screening mammogram for breast cancer (V76 12) (Z12 31)   8  GERD (gastroesophageal reflux disease) (530 81) (K21 9)   9  Hair loss (704 00) (L65 9)   10  Hypothyroidism (244 9) (E03 9)   11  Influenza vaccine needed (V04 81) (Z23)   12  Left hip pain (719 45) (M25 552)   13  Need for vaccination with 13-polyvalent pneumococcal conjugate vaccine (V03 82) (Z23)   14  Osteoporosis (733 00) (M81 0)   15  Peripheral vascular disease (443 9) (I73 9)   16  Preop examination (V72 84) (Z01 818)   17  Primary localized osteoarthritis of left hip (715 15) (M16 12)   18  RBBB (426 4) (I45 10)   19  Screening for osteoporosis (V82 81) (T22 508)    Past Medical History    1  History of appendicitis (V12 79) (Z87 19)   2  History of depression (V11 8) (Z86 59)   3  History of headache (V13 89) (Z87 898)   4  History of hyperthyroidism (V12 29) (Z86 39)   5  History of varicella (V12 09) (Z86 19)   6   History of Polio (045 90) (A80 9)    Surgical History    1  History of Appendectomy   2  History of Tonsillectomy   3  History of Total Hip Replacement   4  History of Treatment Of Hip Fracture    Family History  Mother    1  Family history of rheumatoid arthritis (V17 7) (Z82 61)  Sister    2  Family history of rheumatoid arthritis (V17 7) (Z82 61)  Brother    3  Family history of malignant neoplasm (V16 9) (Z80 9)  Family History    4  Family history of No cardiac disease    Social History    · Always uses seat belt   · Caffeine use (V49 89) (F15 90)   · Denied: History of Drug use   · Never a smoker   · No alcohol use   · Non-smoker (V49 89) (Z78 9)   · One child   · Retired   · [place]   · Single  Social History Reviewed: The social history was reviewed and updated today  Current Meds   1  Alendronate Sodium 70 MG Oral Tablet; TAKE 1 TABLET ONCE WEEKLY; Therapy: 28QUK9186 to (Evaluate:31Oct2017)  Requested for: 51Trs0403; Last   Rx:74Eih4877 Ordered   2  Centrum Silver Oral Tablet; TAKE 1 TABLET DAILY; Therapy: 30FIO9884 to Recorded   3  DiazePAM 5 MG Oral Tablet; TAKE 1 TABLET AT BEDTIME; Therapy: 61SDS3488 to (Evaluate:45Tst1200); Last Rx:01Aug2017 Ordered   4  Famotidine 20 MG Oral Tablet; TAKE ONE TABLET BY MOUTH EVERY 12 HOURS; Therapy: 63TBQ8801 to (Evaluate:21Apr2017)  Requested for: 63Jxd3400; Last   Rx:46Ijc5504 Ordered   5  Folic Acid 1 MG Oral Tablet; TAKE 1 TABLET DAILY; Therapy: 04Apr2015 to (Evaluate:78Dyy8575)  Requested for: 02LHB4111; Last   Rx:51Wxs5228 Ordered   6  Gabapentin 100 MG Oral Capsule; TAKE ONE CAPSULE BY MOUTH THREE TIMES A   DAY; Therapy: 04WUE7343 to (Evaluate:24Zsc5646)  Requested for: 96AQJ1381; Last   Rx:22Jun2017 Ordered   7  Ibuprofen 600 MG Oral Tablet; TAKE 1 TABLET Every 8 hours; Therapy: 60ZFY5225 to (Evaluate:11Mar2017)  Requested for: 75XYE2902; Last   Rx:01Mar2017 Ordered   8   Levothyroxine Sodium 25 MCG Oral Tablet; take one tablet by mouth every day;   Therapy: 29Nro6217 to (Evaluate:62Fau5881)  Requested for: 59Gvo1257; Last   Rx:90Zks7464 Ordered   9  Nortriptyline HCl - 10 MG Oral Capsule; TAKE ONE CAPSULE BY MOUTH AT BEDTIME; Therapy: 38KKW3369 to (Evaluate:73Dpj1846)  Requested for: 04XGE9587; Last   Rx:22Jun2017 Ordered   10  Nortriptyline HCl - 25 MG Oral Capsule; TAKE ONE CAPSULE BY MOUTH AT BEDTIME; Therapy: 13TIN9824 to (Evaluate:57Wpj9751)  Requested for: 99WUF0434; Last    Rx:22Jun2017 Ordered   11  Vitamin C Plus 1000 MG Oral Tablet; TAKE 1 TABLET DAILY; Therapy: 46TJW5972 to Recorded   12  Vitamin D 1000 UNIT Oral Tablet; TAKE 1 TABLET DAILY; Therapy: 50DYN2172 to (Evaluate:74Mnr2918) Recorded   13  Vitamin E 400 UNIT Oral Tablet; take 2 tablet daily; Therapy: 66FJK0863 to Recorded  Medication List Reviewed: The medication list was reviewed and updated today  Allergies    1  No Known Drug Allergies    Vitals  Signs   Recorded: 12Sep2017 12:52PM   Temperature: 98 2 F  Heart Rate: 86  Respiration: 16  Systolic: 278  Diastolic: 80  Height: 5 ft 1 42 in  Weight: 106 lb   BMI Calculated: 19 76  BSA Calculated: 1 45  O2 Saturation: 98  Pain Scale: 7    Physical Exam    Constitutional   General appearance: No acute distress, well appearing and well nourished  Pulmonary   Respiratory effort: No increased work of breathing or signs of respiratory distress  Auscultation of lungs: Clear to auscultation  Cardiovascular   Auscultation of heart: Normal rate and rhythm, normal S1 and S2, without murmurs  Examination of extremities for edema and/or varicosities: Normal     Abdomen   Abdomen: Non-tender, no masses      Psychiatric   Mood and affect: Normal          Results/Data  (1) URINALYSIS w URINE C/S REFLEX (will reflex a microscopy if leukocytes, occult blood, or nitrites are not within normal limits) 11Sep2017 06:37AM Maico Padmini Order Number: [id number]     Test Name Result Flag Reference   COLOR Yellow     CLARITY Clear     PH UA 7 0  4 5-8 0   LEUKOCYTE ESTERASE UA Negative  Negative   NITRITE UA Negative  Negative   PROTEIN UA Negative mg/dl  Negative   GLUCOSE UA Negative mg/dl  Negative   KETONES UA Negative mg/dl  Negative   UROBILINOGEN UA 0 2 E U /dl  0 2, 1 0 E U /dl   BILIRUBIN UA Negative  Negative   BLOOD UA Negative  Negative   SPECIFIC GRAVITY UA 1 004  1 003-1 030   COLOR Yellow     CLARITY Clear     PH UA 7 0  4 5-8 0   LEUKOCYTE ESTERASE UA Negative  Negative   NITRITE UA Negative  Negative   PROTEIN UA Negative mg/dl  Negative   GLUCOSE UA Negative mg/dl  Negative   KETONES UA Negative mg/dl  Negative   UROBILINOGEN UA 0 2 E U /dl  0 2, 1 0 E U /dl   BILIRUBIN UA Negative  Negative   BLOOD UA Negative  Negative   SPECIFIC GRAVITY UA 1 004  1 003-1 030                 Future Appointments    Date/Time Provider Specialty Site   11/14/2017 02:00 PM JACEY Arreguin   Family Medicine [address]     Signatures   Electronically signed by : JACEY Freedman ; Sep 12 2017  4:57PM EST                       (Author)

## 2018-01-15 NOTE — CONSULTS
History of Present Illness  Pre-Op Visit (Brief): The patient is being seen for a preoperative visit  The procedure is a(n) left hip nail removal and arthroplasty scheduled for 11/27/17 with Miguelina Ferrer  The indication for surgery is left hip pain  Surgical Risk Assessment:   Prior Anesthesia: She had prior anesthesia and no prior adverse reaction to general anesthesia  Pertinent Past Medical History: no pertinent past medical history  Exercise Capacity: able to walk four blocks without symptoms and able to walk two flights of stairs without symptoms  Lifestyle Factors: denies alcohol use, denies tobacco use and denies illegal drug use  Symptoms: no symptoms, no chest pain, no cough, no dyspnea, no edema, no palpitations and no wheezing  Pertinent Family History: no pertinent family history  Living Situation: home is secure and supportive  Review of Systems    Constitutional: No fever, no chills, feels well, no tiredness, no recent weight gain or weight loss  Eyes: No complaints of eye pain, no red eyes, no eyesight problems, no discharge, no dry eyes, no itching of eyes  ENT: no complaints of earache, no loss of hearing, no nose bleeds, no nasal discharge, no sore throat, no hoarseness  Cardiovascular: No complaints of slow heart rate, no fast heart rate, no chest pain, no palpitations, no leg claudication, no lower extremity edema  Respiratory: No complaints of shortness of breath, no wheezing, no cough, no SOB on exertion, no orthopnea, no PND  Gastrointestinal: No complaints of abdominal pain, no constipation, no nausea or vomiting, no diarrhea, no bloody stools  Genitourinary: No complaints of dysuria, no incontinence, no pelvic pain, no dysmenorrhea, no vaginal discharge or bleeding  Musculoskeletal: as noted in HPI  Integumentary: No complaints of skin rash or lesions, no itching, no skin wounds, no breast pain or lump     Neurological: No complaints of headache, no confusion, no convulsions, no numbness, no dizziness or fainting, no tingling, no limb weakness, no difficulty walking  Psychiatric: Not suicidal, no sleep disturbance, no anxiety or depression, no change in personality, no emotional problems  Endocrine: No complaints of proptosis, no hot flashes, no muscle weakness, no deepening of the voice, no feelings of weakness  Hematologic/Lymphatic: No complaints of swollen glands, no swollen glands in the neck, does not bleed easily, does not bruise easily  Active Problems    1  Aftercare following surgery of the musculoskeletal system (V58 78) (Z47 89)   2  Anxiety (300 00) (F41 9)   3  Closed fracture of femur, neck (820 8) (S72 009A)   4  Counseling for sexually transmitted disease (V65 45) (Z70 8)   5  Elevated alkaline phosphatase level (790 5) (R74 8)   6  Encounter for screening colonoscopy (V76 51) (Z12 11)   7  Encounter for screening mammogram for breast cancer (V76 12) (Z12 31)   8  GERD (gastroesophageal reflux disease) (530 81) (K21 9)   9  Hair loss (704 00) (L65 9)   10  Hospital discharge follow-up (V67 59) (Z09)   11  Hypothyroidism (244 9) (E03 9)   12  Influenza vaccine needed (V04 81) (Z23)   13  Left hip pain (719 45) (M25 552)   14  Need for vaccination with 13-polyvalent pneumococcal conjugate vaccine (V03 82) (Z23)   15  Osteoporosis (733 00) (M81 0)   16  Peripheral vascular disease (443 9) (I73 9)   17  Preop examination (V72 84) (Z01 818)   18  Primary localized osteoarthritis of left hip (715 15) (M16 12)   19  RBBB (426 4) (I45 10)   20  Screening for osteoporosis (V82 81) (Z13 820)   21   Urinary tract infection due to Proteus (599 0,041 6) (N39 0,B96 4)    Past Medical History    · History of appendicitis (V12 79) (Z87 19)   · History of depression (V11 8) (Z86 59)   · History of headache (V13 89) (J11 850)   · History of hyperthyroidism (V12 29) (Z86 39)   · History of varicella (V12 09) (Z86 19)   · History of Polio (718 90) (A80 9)    The active problems and past medical history were reviewed and updated today  Surgical History    · History of Appendectomy   · History of Tonsillectomy   · History of Total Hip Replacement   · History of Treatment Of Hip Fracture    The surgical history was reviewed and updated today  Family History    · Family history of rheumatoid arthritis (V17 7) (Z82 61)    · Family history of rheumatoid arthritis (V17 7) (Z82 61)    · Family history of malignant neoplasm (V16 9) (Z80 9)    · Family history of No cardiac disease    Social History    · Always uses seat belt   · Caffeine use (V49 89) (F15 90)   · Denied: History of Drug use   · Never a smoker   · No alcohol use   · Non-smoker (V49 89) (Z78 9)   · One child   · Retired   · [employer]   · Single  The social history was reviewed and updated today  Current Meds   1  Alendronate Sodium 70 MG Oral Tablet; TAKE 1 TABLET ONCE WEEKLY; Therapy: 79XEI3146 to (Evaluate:20Jan2018)  Requested for: 28Oct2017; Last   Rx:28Oct2017 Ordered   2  Centrum Silver Oral Tablet; TAKE 1 TABLET DAILY; Therapy: 22EIF4309 to Recorded   3  DiazePAM 5 MG Oral Tablet; TAKE 1 TABLET AT BEDTIME; Therapy: 05ULK4520 to (Evaluate:11Nov2017); Last Rx:12Oct2017 Ordered   4  Famotidine 20 MG Oral Tablet; TAKE ONE TABLET BY MOUTH EVERY 12 HOURS; Therapy: 41JTD9573 to (Evaluate:21Apr2017)  Requested for: 54Ahr0367; Last   Rx:62Ahy8071 Ordered   5  Folic Acid 1 MG Oral Tablet; TAKE 1 TABLET DAILY; Therapy: 83Aci6442 to [phone])  Requested for: 30Oct2017; Last   Rx:30Oct2017 Ordered   6  Gabapentin 100 MG Oral Capsule; TAKE ONE CAPSULE BY MOUTH THREE TIMES A   DAY; Therapy: 66IRZ3700 to (Evaluate:13Ids6539)  Requested for: 27XDG5993; Last   Rx:30Jol6306 Ordered   7  Ibuprofen 600 MG Oral Tablet; TAKE 1 TABLET Every 8 hours; Therapy: 46LFO1785 to (Evaluate:11Mar2017)  Requested for: 65PLW1104; Last   Rx:01Mar2017 Ordered   8   Levothyroxine Sodium 25 MCG Oral Tablet; take one tablet by mouth every day; Therapy: 28GIX0011 to (Evaluate:84Ocq1819)  Requested for: 16Xon3238; Last   Rx:09Evy3205 Ordered   9  Nortriptyline HCl - 10 MG Oral Capsule; TAKE ONE CAPSULE BY MOUTH AT BEDTIME; Therapy: 54JIS0793 to (Evaluate:74Ujb7573)  Requested for: 54QTA8092; Last   Rx:22Jun2017 Ordered   10  Nortriptyline HCl - 25 MG Oral Capsule; TAKE ONE CAPSULE BY MOUTH AT BEDTIME; Therapy: 85AOC2534 to (Evaluate:27Mbu1329)  Requested for: 42EHC2370; Last    Rx:22Jun2017 Ordered   11  OxyCODONE HCl - 5 MG Oral Tablet; TAKE 1 TO 2 TABLETS EVERY 6 HOURS AS    NEEDED FOR PAIN;    Therapy: 76ESZ4458 to (Evaluate:14Nov2017); Last Rx:09Nov2017 Ordered   12  Vitamin C Plus 1000 MG Oral Tablet; TAKE 1 TABLET DAILY; Therapy: 14YYZ4926 to Recorded   13  Vitamin D 1000 UNIT Oral Tablet; TAKE 1 TABLET DAILY; Therapy: 00CXR6575 to (Evaluate:62Bks8124) Recorded   14  Vitamin E 400 UNIT Oral Tablet; take 2 tablet daily; Therapy: 26EXQ9515 to Recorded    The medication list was reviewed and updated today  Allergies    1  No Known Drug Allergies    Vitals  Signs    Temperature: 98 3 F  Heart Rate: 78  Respiration: 20  Systolic: 142  Diastolic: 90  Height: 5 ft 1 in  Weight: 103 lb   BMI Calculated: 19 46  BSA Calculated: 1 42  O2 Saturation: 98  Pain Scale: 7    Physical Exam    Constitutional   General appearance: No acute distress, well appearing and well nourished  Head and Face   Head and face: Normal     Eyes   Conjunctiva and lids: No swelling, erythema or discharge  Ears, Nose, Mouth, and Throat   Otoscopic examination: Tympanic membranes translucent with normal light reflex  Canals patent without erythema  Neck   Neck: Supple, symmetric, trachea midline, no masses  Pulmonary   Respiratory effort: No increased work of breathing or signs of respiratory distress  Auscultation of lungs: Clear to auscultation      Cardiovascular   Auscultation of heart: Normal rate and rhythm, normal S1 and S2, no murmurs  Examination of extremities for edema and/or varicosities: Normal     Chest   Chest: Normal     Abdomen   Abdomen: Non-tender, no masses  Musculoskeletal   Gait and station: Abnormal     Skin   Skin and subcutaneous tissue: Normal without rashes or lesions  Neurologic   Cranial nerves: Cranial nerves II-XII intact  Psychiatric   Mood and affect: Normal        Results/Data  (1) TSH WITH FT4 REFLEX 81MRG7512 06:38AM Viktoriya Thrasher Order Number: [id number]     Test Name Result Flag Reference   TSH 3 220 uIU/mL  0 358-3 740   Patients undergoing fluorescein dye angiography may retain small amounts of fluorescein in the body for 48-72 hours post procedure  Samples containing fluorescein can produce falsely depressed TSH values  If the patient had this procedure,a specimen should be resubmitted post fluorescein clearance  The recommended reference ranges for TSH during pregnancy are as follows:  First trimester 0 1 to 2 5 uIU/mL  Second trimester  0 2 to 3 0 uIU/mL  Third trimester 0 3 to 3 0 uIU/m     (1) CBC/PLT/DIFF [id number] 06:38AM Viktoriya Thrasher Order Number: [id number]     Test Name Result Flag Reference   WBC COUNT 6 58 Thousand/uL  4 31-10 16   RBC COUNT 4 33 Million/uL  3 81-5 12   HEMOGLOBIN 12 0 g/dL  11 5-15 4   HEMATOCRIT 38 1 %  34 8-46  1   MCV 88 fL  82-98   MCH 27 7 pg  26 8-34 3   MCHC 31 5 g/dL  31 4-37 4   RDW 13 9 %  11 6-15 1   MPV 9 6 fL  8 9-12 7   PLATELET COUNT 621 Thousands/uL  149-390   nRBC AUTOMATED 0 /100 WBCs     NEUTROPHILS RELATIVE PERCENT 66 %  43-75   LYMPHOCYTES RELATIVE PERCENT 24 %  14-44   MONOCYTES RELATIVE PERCENT 9 %  4-12   EOSINOPHILS RELATIVE PERCENT 1 %  0-6   BASOPHILS RELATIVE PERCENT 0 %  0-1   NEUTROPHILS ABSOLUTE COUNT 4 34 Thousands/? ??L  1 85-7 62   LYMPHOCYTES ABSOLUTE COUNT 1 56 Thousands/? ??L  0 60-4 47   MONOCYTES ABSOLUTE COUNT 0 60 Thousand/? ??L  0 17-1 22 EOSINOPHILS ABSOLUTE COUNT 0 06 Thousand/? ??L  0 00-0 61   BASOPHILS ABSOLUTE COUNT 0 01 Thousands/? ??L  0 00-0 10     (1) COMPREHENSIVE METABOLIC PANEL 46YWY8135 05:48OC Claudell Comas Order Number: [id number]     Test Name Result Flag Reference   SODIUM 142 mmol/L  136-145   POTASSIUM 4 2 mmol/L  3 5-5 3   CHLORIDE 106 mmol/L  100-108   CARBON DIOXIDE 30 mmol/L  21-32   ANION GAP (CALC) 6 mmol/L  4-13   BLOOD UREA NITROGEN 14 mg/dL  5-25   CREATININE 0 56 mg/dL L 0 60-1 30   Standardized to IDMS reference method   CALCIUM 9 0 mg/dL  8 3-10 1   BILI, TOTAL 0 31 mg/dL  0 20-1 00   ALK PHOSPHATAS 110 U/L     ALT (SGPT) 20 U/L  12-78   Specimen collection should occur prior to Sulfasalazine and/or Sulfapyridine administration due to the potential for falsely depressed results  AST(SGOT) 13 U/L  5-45   Specimen collection should occur prior to Sulfasalazine administration due to the potential for falsely depressed results  ALBUMIN 3 5 g/dL  3 5-5 0   TOTAL PROTEIN 6 7 g/dL  6 4-8 2   eGFR 93 ml/min/1 73sq m     National Kidney Disease Education Program recommendations are as follows:  GFR calculation is accurate only with a steady state creatinine  Chronic Kidney disease less than 60 ml/min/1 73 sq  meters  Kidney failure less than 15 ml/min/1 73 sq  meters  GLUCOSE FASTING 87 mg/dL  65-99   Specimen collection should occur prior to Sulfasalazine administration due to the potential for falsely depressed results  Specimen collection should occur prior to Sulfapyridine administration due to the potential for falsely elevated results  No acute ischemia  Rhythm and rate: normal sinus rhythm  QRS: right bundle branch block  Assessment    1  Preop examination (V72 84) (Z01 818)   2  Left hip pain (689 45) (W85 468)    Discussion/Summary  Surgical Clearance: She is at a LOW risk from a cardiovascular standpoint at this time without any additional cardiac testing   Reevaluation needed, if she should present with symptoms prior to surgery/procedure  Surgical clearance faxed to Dr Verna Hernandez   End of Encounter Meds    1  OxyCODONE HCl - 5 MG Oral Tablet; TAKE 1 TO 2 TABLETS EVERY 6 HOURS AS   NEEDED FOR PAIN;   Therapy: 35JVK0500 to (Evaluate:14Nov2017); Last Rx:09Nov2017 Ordered    2  DiazePAM 5 MG Oral Tablet; TAKE 1 TABLET AT BEDTIME; Therapy: 28DLX8971 to (Evaluate:11Nov2017); Last Rx:12Oct2017 Ordered   3  Nortriptyline HCl - 10 MG Oral Capsule; TAKE ONE CAPSULE BY MOUTH AT BEDTIME; Therapy: 87NCZ6933 to (Evaluate:48Bzz7359)  Requested for: 61BPH6714; Last   Rx:22Jun2017 Ordered   4  Nortriptyline HCl - 25 MG Oral Capsule; TAKE ONE CAPSULE BY MOUTH AT BEDTIME; Therapy: 56LKG5761 to (Evaluate:81Qkb6424)  Requested for: 05QBX3876; Last   Rx:22Jun2017 Ordered    5  Gabapentin 100 MG Oral Capsule; TAKE ONE CAPSULE BY MOUTH THREE TIMES A   DAY; Therapy: 86JQK4179 to (Evaluate:03Lkk9260)  Requested for: 06ZPT1311; Last   Rx:22Jun2017 Ordered    6  Famotidine 20 MG Oral Tablet; TAKE ONE TABLET BY MOUTH EVERY 12 HOURS; Therapy: 26HIX5921 to (Evaluate:21Apr2017)  Requested for: 45Tgk6318; Last   Rx:63Yjq3727 Ordered    7  Folic Acid 1 MG Oral Tablet; TAKE 1 TABLET DAILY; Therapy: 56Zqw7398 to 452 8137)  Requested for: 30Oct2017; Last   Rx:30Oct2017 Ordered    8  Levothyroxine Sodium 25 MCG Oral Tablet; take one tablet by mouth every day; Therapy: 71YEW1599 to (Evaluate:65Wiu7455)  Requested for: 17Shh4767; Last   Rx:16Krx4010 Ordered    9  Ibuprofen 600 MG Oral Tablet; TAKE 1 TABLET Every 8 hours; Therapy: 94EJP7905 to (Evaluate:11Mar2017)  Requested for: 49IDX8814; Last   Rx:01Mar2017 Ordered    10  Alendronate Sodium 70 MG Oral Tablet; TAKE 1 TABLET ONCE WEEKLY; Therapy: 01ISJ9854 to (Evaluate:20Jan2018)  Requested for: 28Oct2017; Last    Rx:28Oct2017 Ordered    11  Centrum Silver Oral Tablet; TAKE 1 TABLET DAILY; Therapy: 79ACA2811 to Recorded   12   Vitamin C Plus 1000 MG Oral Tablet; TAKE 1 TABLET DAILY; Therapy: 59PLU3972 to Recorded   13  Vitamin D 1000 UNIT Oral Tablet; TAKE 1 TABLET DAILY; Therapy: 38WMX6888 to (Evaluate:12Uyo4749) Recorded   14  Vitamin E 400 UNIT Oral Tablet; take 2 tablet daily;     Therapy: 42GZC7768 to Recorded    Signatures   Electronically signed by : JACEY Woodruff ; Nov 27 2017  9:13AM EST                       (Author)

## 2018-01-17 NOTE — PROGRESS NOTES
Assessment    1  Preop examination (V72 84) (Z01 818)   2  Left hip pain (719 45) (M25 432)   3  Encounter for preventive health examination (V70 0) (Z00 00)    Discussion/Summary  Impression: Initial Annual Wellness Visit, with preventive exam as well as age and risk appropriate counseling completed  Patient Discussion: plan discussed with the patient, follow-up visit needed in one year  History of Present Illness  The patient is being seen for the initial annual wellness visit  Medicare Screening and Risk Factors   Hospitalizations: she has been previously hospitalizied and she has been hospitalized 1 times  Medicare Screening Tests Risk Questions   Drug and Alcohol Use: The patient has never smoked cigarettes and has never used smokeless tobacco  The patient reports never drinking alcohol  She has never used illicit drugs  Diet and Physical Activity: Current diet includes well balanced meals, low fat food choices, low carbohydrate food choices and low salt food choices  She exercises daily  Exercise: walking 5 minutes per day  Mood Disorder and Cognitive Impairment Screening: She denies feeling down, depressed, or hopeless over the past two weeks  She denies feeling little interest or pleasure in doing things over the past two weeks  Cognitive impairment screening: denies difficulty learning/retaining new information, denies difficulty handling complex tasks, denies difficulty with reasoning, denies difficulty with spatial ability and orientation, denies difficulty with language and denies difficulty with behavior  Functional Ability/Level of Safety: Hearing is normal bilaterally, normal in the right ear and normal in the left ear  She denies hearing difficulties  She does not use a hearing aid   Activities of daily living details: does not need help using the phone, no transportation help needed, no meal preparation help needed, does not need help doing housework, does not need help doing laundry, does not need help managing medications and does not need help managing money  Fall risk factors: The patient fell 0 times in the past 12 months  Home safety risk factors:  no unfamiliar surroundings, no loose rugs, no poor household lighting, no uneven floors, no household clutter, grab bars in the bathroom and handrails on the stairs  Advance Directives: Advance directives: no living will, no durable power of  for health care directives and no advance directives  Co-Managers and Medical Equipment/Suppliers: See Patient Care Team     The patient currently has no urinary incontinence symptoms  Date of last glaucoma screen was 2 years ago      Active Problems    1  Aftercare following surgery of the musculoskeletal system (V58 78) (Z47 89)   2  Anxiety (300 00) (F41 9)   3  Closed fracture of femur, neck (820 8) (S72 009A)   4  Counseling for sexually transmitted disease (V65 45) (Z70 8)   5  Elevated alkaline phosphatase level (790 5) (R74 8)   6  Encounter for screening colonoscopy (V76 51) (Z12 11)   7  Encounter for screening mammogram for breast cancer (V76 12) (Z12 31)   8  GERD (gastroesophageal reflux disease) (530 81) (K21 9)   9  Hair loss (704 00) (L65 9)   10  Hospital discharge follow-up (V67 59) (Z09)   11  Hypothyroidism (244 9) (E03 9)   12  Influenza vaccine needed (V04 81) (Z23)   13  Left hip pain (719 45) (M25 552)   14  Need for vaccination with 13-polyvalent pneumococcal conjugate vaccine (V03 82) (Z23)   15  Osteoporosis (733 00) (M81 0)   16  Peripheral vascular disease (443 9) (I73 9)   17  Preop examination (V72 84) (Z01 818)   18  Primary localized osteoarthritis of left hip (715 15) (M16 12)   19  RBBB (426 4) (I45 10)   20  Screening for osteoporosis (V82 81) (Z13 820)   21  Urinary tract infection due to Proteus (599 0,041 6) (N39 0,B96 4)    Past Medical History    1  History of appendicitis (V12 79) (Z87 19)   2  History of depression (V11 8) (Z86 59)   3  History of headache (V13 89) (Z87 898)   4  History of hyperthyroidism (V12 29) (Z86 39)   5  History of varicella (V12 09) (Z86 19)   6  History of Polio (045 90) (A80 9)    The active problems and past medical history were reviewed and updated today  Surgical History    1  History of Appendectomy   2  History of Tonsillectomy   3  History of Total Hip Replacement   4  History of Treatment Of Hip Fracture    The surgical history was reviewed and updated today  Family History  Mother    1  Family history of rheumatoid arthritis (V17 7) (Z82 61)  Sister    2  Family history of rheumatoid arthritis (V17 7) (Z82 61)  Brother    3  Family history of malignant neoplasm (V16 9) (Z80 9)  Family History    4  Family history of No cardiac disease    The family history was reviewed and updated today  Social History    · Always uses seat belt   · Caffeine use (V49 89) (F15 90)   · Denied: History of Drug use   · Never a smoker   · No alcohol use   · Non-smoker (V49 89) (Z78 9)   · One child   · Retired   · [employer]   · Single  The social history was reviewed and updated today  Current Meds   1  Alendronate Sodium 70 MG Oral Tablet; TAKE 1 TABLET ONCE WEEKLY; Therapy: 63NKJ6396 to (Evaluate:20Jan2018)  Requested for: 28Oct2017; Last   Rx:28Oct2017 Ordered   2  Centrum Silver Oral Tablet; TAKE 1 TABLET DAILY; Therapy: 98QTT5793 to Recorded   3  DiazePAM 5 MG Oral Tablet; TAKE 1 TABLET AT BEDTIME; Therapy: 87MJX3481 to (Evaluate:11Nov2017); Last Rx:61Kik6941 Ordered   4  Famotidine 20 MG Oral Tablet; TAKE ONE TABLET BY MOUTH EVERY 12 HOURS; Therapy: 98RXY5376 to (Evaluate:21Apr2017)  Requested for: 11Bgp4752; Last   Rx:11Qim9146 Ordered   5  Folic Acid 1 MG Oral Tablet; TAKE 1 TABLET DAILY; Therapy: 46Vmy4983 to [phone])  Requested for: 30Oct2017; Last   Rx:93Vdi0224 Ordered   6  Gabapentin 100 MG Oral Capsule; TAKE ONE CAPSULE BY MOUTH THREE TIMES A   DAY;    Therapy: 63GOX5532 to (Evaluate:19Dec2017)  Requested for: 57WMP0045; Last   Rx:22Jun2017 Ordered   7  Ibuprofen 600 MG Oral Tablet; TAKE 1 TABLET Every 8 hours; Therapy: 90CNO8602 to (Evaluate:11Mar2017)  Requested for: 71YDN3300; Last   Rx:01Mar2017 Ordered   8  Levothyroxine Sodium 25 MCG Oral Tablet; take one tablet by mouth every day; Therapy: 82ACM7125 to (Evaluate:22Zul7817)  Requested for: 58Arc7111; Last   Rx:23Aug2017 Ordered   9  Nortriptyline HCl - 10 MG Oral Capsule; TAKE ONE CAPSULE BY MOUTH AT BEDTIME; Therapy: 55KIJ7154 to (Evaluate:19Dec2017)  Requested for: 62IFC1939; Last   Rx:22Jun2017 Ordered   10  Nortriptyline HCl - 25 MG Oral Capsule; TAKE ONE CAPSULE BY MOUTH AT BEDTIME; Therapy: 52SDT0058 to (Evaluate:19Dec2017)  Requested for: 31KAI9777; Last    Rx:22Jun2017 Ordered   11  OxyCODONE HCl - 5 MG Oral Tablet; TAKE 1 TO 2 TABLETS EVERY 6 HOURS AS    NEEDED FOR PAIN;    Therapy: 92SYP3865 to (Evaluate:14Nov2017); Last Rx:09Nov2017 Ordered   12  Vitamin C Plus 1000 MG Oral Tablet; TAKE 1 TABLET DAILY; Therapy: 20IQW5623 to Recorded   13  Vitamin D 1000 UNIT Oral Tablet; TAKE 1 TABLET DAILY; Therapy: 02ZBK4455 to (Evaluate:90Yfc9095) Recorded   14  Vitamin E 400 UNIT Oral Tablet; take 2 tablet daily; Therapy: 64CES6312 to Recorded    The medication list was reviewed and updated today  Allergies    1  No Known Drug Allergies    Immunizations  Influenza --- Bety Punch: Temporarily Deferred: Pt refuses, As of: 13MUI1300, Defer for 1 Years; Series2: 23-Sep-2017  (72y)   PCV --- Bety Punch: 16-May-2017  (71y);  Series2: 23-Sep-2017  (72y)     Vitals  Signs   Recorded: 06HZL5962 02:04PM   Temperature: 98 3 F  Heart Rate: 78  Respiration: 20  Systolic: 738  Diastolic: 90  Height: 5 ft 1 in  Weight: 103 lb   BMI Calculated: 19 46  BSA Calculated: 1 42  O2 Saturation: 98  Pain Scale: 7    Results/Data  (1) TSH WITH FT4 REFLEX 33HBE8560 06:38AM Serjio Freeze Order Number: [id number]     Test Name Result Flag Reference   TSH 3 220 uIU/mL  0 358-3 740   Patients undergoing fluorescein dye angiography may retain small amounts of fluorescein in the body for 48-72 hours post procedure  Samples containing fluorescein can produce falsely depressed TSH values  If the patient had this procedure,a specimen should be resubmitted post fluorescein clearance  The recommended reference ranges for TSH during pregnancy are as follows:  First trimester 0 1 to 2 5 uIU/mL  Second trimester  0 2 to 3 0 uIU/mL  Third trimester 0 3 to 3 0 uIU/m     (1) CBC/PLT/DIFF 14RMF9938 06:38AM [facility] Order Number: [id number]     Test Name Result Flag Reference   WBC COUNT 6 58 Thousand/uL  4 31-10 16   RBC COUNT 4 33 Million/uL  3 81-5 12   HEMOGLOBIN 12 0 g/dL  11 5-15 4   HEMATOCRIT 38 1 %  34 8-46  1   MCV 88 fL  82-98   MCH 27 7 pg  26 8-34 3   MCHC 31 5 g/dL  31 4-37 4   RDW 13 9 %  11 6-15 1   MPV 9 6 fL  8 9-12 7   PLATELET COUNT 347 Thousands/uL  149-390   nRBC AUTOMATED 0 /100 WBCs     NEUTROPHILS RELATIVE PERCENT 66 %  43-75   LYMPHOCYTES RELATIVE PERCENT 24 %  14-44   MONOCYTES RELATIVE PERCENT 9 %  4-12   EOSINOPHILS RELATIVE PERCENT 1 %  0-6   BASOPHILS RELATIVE PERCENT 0 %  0-1   NEUTROPHILS ABSOLUTE COUNT 4 34 Thousands/? ??L  1 85-7 62   LYMPHOCYTES ABSOLUTE COUNT 1 56 Thousands/? ??L  0 60-4 47   MONOCYTES ABSOLUTE COUNT 0 60 Thousand/? ??L  0 17-1 22   EOSINOPHILS ABSOLUTE COUNT 0 06 Thousand/? ??L  0 00-0 61   BASOPHILS ABSOLUTE COUNT 0 01 Thousands/? ??L  0 00-0 10     (1) COMPREHENSIVE METABOLIC PANEL 17VCY3282 89:13WV [facility] Order Number: [id number]     Test Name Result Flag Reference   SODIUM 142 mmol/L  136-145   POTASSIUM 4 2 mmol/L  3 5-5 3   CHLORIDE 106 mmol/L  100-108   CARBON DIOXIDE 30 mmol/L  21-32   ANION GAP (CALC) 6 mmol/L  4-13   BLOOD UREA NITROGEN 14 mg/dL  5-25   CREATININE 0 56 mg/dL L 0 60-1 30   Standardized to IDMS reference method CALCIUM 9 0 mg/dL  8 3-10 1   BILI, TOTAL 0 31 mg/dL  0 20-1 00   ALK PHOSPHATAS 110 U/L     ALT (SGPT) 20 U/L  12-78   Specimen collection should occur prior to Sulfasalazine and/or Sulfapyridine administration due to the potential for falsely depressed results  AST(SGOT) 13 U/L  5-45   Specimen collection should occur prior to Sulfasalazine administration due to the potential for falsely depressed results  ALBUMIN 3 5 g/dL  3 5-5 0   TOTAL PROTEIN 6 7 g/dL  6 4-8 2   eGFR 93 ml/min/1 73sq m     National Kidney Disease Education Program recommendations are as follows:  GFR calculation is accurate only with a steady state creatinine  Chronic Kidney disease less than 60 ml/min/1 73 sq  meters  Kidney failure less than 15 ml/min/1 73 sq  meters  GLUCOSE FASTING 87 mg/dL  65-99   Specimen collection should occur prior to Sulfasalazine administration due to the potential for falsely depressed results  Specimen collection should occur prior to Sulfapyridine administration due to the potential for falsely elevated results  Future Appointments    Date/Time Provider Specialty Site   01/24/2018 01:00 PM JACEY Lundberg 476   11/27/2017 09:45 AM JACEY Garcia  [address] [place] [address]   12/05/2017 10:45 AM JACEY Garcia  Orthopedic Surgery [facility]   12/12/2017 10:45 AM JACEY Garcia   Orthopedic Surgery [address]     Signatures   Electronically signed by : JACEY Dale ; Nov 14 2017  5:00PM EST                       (Author)

## 2018-01-22 VITALS
HEART RATE: 86 BPM | BODY MASS INDEX: 20.01 KG/M2 | TEMPERATURE: 98.2 F | WEIGHT: 106 LBS | SYSTOLIC BLOOD PRESSURE: 120 MMHG | RESPIRATION RATE: 16 BRPM | DIASTOLIC BLOOD PRESSURE: 80 MMHG | HEIGHT: 61 IN | OXYGEN SATURATION: 98 %

## 2018-01-22 VITALS — DIASTOLIC BLOOD PRESSURE: 76 MMHG | SYSTOLIC BLOOD PRESSURE: 142 MMHG | HEART RATE: 84 BPM

## 2018-01-22 VITALS
DIASTOLIC BLOOD PRESSURE: 82 MMHG | HEIGHT: 61 IN | HEART RATE: 82 BPM | SYSTOLIC BLOOD PRESSURE: 133 MMHG | BODY MASS INDEX: 19.83 KG/M2 | WEIGHT: 105 LBS

## 2018-01-22 VITALS
WEIGHT: 104 LBS | BODY MASS INDEX: 19.63 KG/M2 | DIASTOLIC BLOOD PRESSURE: 72 MMHG | TEMPERATURE: 98 F | HEART RATE: 81 BPM | OXYGEN SATURATION: 99 % | SYSTOLIC BLOOD PRESSURE: 120 MMHG | HEIGHT: 61 IN | RESPIRATION RATE: 18 BRPM

## 2018-01-22 VITALS — WEIGHT: 106.13 LBS | BODY MASS INDEX: 20.84 KG/M2 | HEIGHT: 60 IN

## 2018-01-22 VITALS — HEART RATE: 78 BPM | DIASTOLIC BLOOD PRESSURE: 81 MMHG | SYSTOLIC BLOOD PRESSURE: 153 MMHG

## 2018-01-23 NOTE — RESULT NOTES
Verified Results  * XR HIP/PELV 2-3 VWS LEFT W PELVIS IF PERFORMED [id number] 03:04PM Susannah Dew Order Number: [id number]   Performing Comments: [facility] 12     Test Name Result Flag Reference   * XR HIP/PELV 2-3 VWS LEFT (Report)     LEFT HIP     INDICATION: Left hip replacement follow-up     COMPARISON: Left hip radiograph 12/5/2017      VIEWS: AP pelvis and 2 coned down views     IMAGES: 3     FINDINGS:     There is stable alignment status post left total hip arthroplasty  No evidence of periprosthetic fracture or acute complication  Stable osseous defect along the lateral margin of the femoral diaphysis  Stable appearance of the right hip arthroplasty  No degenerative changes  No lytic or blastic lesions are seen  Soft tissues are unremarkable  IMPRESSION:     Stable alignment of the left hip arthroplasty without evidence of acute complication  Workstation performed: [id number]     Signed by:   Brian Stock MD   1/12/18     * XR HIP/PELV 2-3 VWS LEFT W PELVIS IF PERFORMED 41KTL5179 11:28AM Susannah Dew Order Number: [id number]   Performing Comments:  to  7     Test Name Result Flag Reference   * XR HIP/PELV 2-3 VWS LEFT (Report)     LEFT HIP     INDICATION: 58-year-old female, left hip arthroplasty, follow-up     COMPARISON: 11/27/2017 x-rays     VIEWS: AP pelvis and 2 coned down views     IMAGES: 3     FINDINGS:   Stable appearance radiographically satisfactory intact appearing left hip arthroplasty  Transverse defect involving proximal to mid femoral shaft likely related to previous screw hole  Persistent bone fragment at the level of the greater trochanter, unchanged  There is no acute fracture or dislocation  No degenerative changes  No lytic or blastic lesions are seen  Lateral skin staples again evident       IMPRESSION:   Radiographically satisfactory left hip arthroplasty, unchanged     No acute osseous abnormality  Workstation performed: [id number]     Signed by:   Rain Joy MD   12/9/17     XR HIP/PELV 1 VW LEFT W PELVIS IF PERFORMED 59EJO3978 07:42AM Britney Mccarthy     Test Name Result Flag Reference   XR HIP/PELV 1 VW LEFT (Report)     C-ARM - left hip     INDICATION: Procedure guidance     COMPARISON: Preoperative radiographs of 11/9/2017     TECHNIQUE:     FLUOROSCOPY TIME:  21 seconds     7 FLUOROSCOPIC IMAGES     FINDINGS:     Fluoroscopic images show removal of left intramedullary nail and placement of a left total hip prosthesis  Osseous and soft tissue detail limited by technique  IMPRESSION:     Status post left total hip arthroplasty  Please refer to the separate procedure notes for additional details            Workstation performed: [id number]     Signed by:   Matthew Sanz MD   11/28/17     (1) APTT 41QCY1677 12:17PM Renard Diana Order Number: [id number]     Test Name Result Flag Reference   PARTIAL THROMBOPLASTIN TIME 25 seconds  23-35   Therapeutic Heparin Range = 60-90 seconds     (1) PT WITH INR 15YYG8771 12:17PM Renard Diana Order Number: [id number]     Test Name Result Flag Reference   INR 0 95  0 86-1 16   PT 12 7 seconds  12 1-14 4     (1) TYPE & SCREEN 36TAM3494 12:17PM Renard Diana Order Number: [id number]     Test Name Result Flag Reference   ABO GROUPING O     RH FACTOR Positive     ANTIBODY SCREEN Negative     SPECIMEN EXPIRATION DATE [id number]       (1) URINALYSIS w URINE C/S REFLEX (will reflex a microscopy if leukocytes, occult blood, or nitrites are not within normal limits) 17KQH7895 12:17PM Renard Diana Order Number: [id number]     Test Name Result Flag Reference   COLOR Yellow     CLARITY Clear     PH UA 7 5  4 5-8 0   LEUKOCYTE ESTERASE UA Negative  Negative   NITRITE UA Negative  Negative   PROTEIN UA Negative mg/dl  Negative   GLUCOSE UA Negative mg/dl  Negative KETONES UA Negative mg/dl  Negative   UROBILINOGEN UA 0 2 E U /dl  0 2, 1 0 E U /dl   BILIRUBIN UA Negative  Negative   BLOOD UA Negative  Negative   SPECIFIC GRAVITY UA 1 011  1 003-1 030     * XR HIP/PELV 2-3 VWS LEFT W PELVIS IF PERFORMED [id number] 11:06AM Renardsimi Diana Order Number: [id number]     Test Name Result Flag Reference   * XR HIP/PELV 2-3 VWS LEFT (Report)     LEFT HIP     INDICATION: 28-year-old female, left hip pain, ORIF     COMPARISON: 10/4/2017 intraoperative x-rays     VIEWS: AP pelvis and 2 coned down views     IMAGES: 4     FINDINGS:   Long intramedullary lolis, compression screw and distal transverse fixating screw are present on the left  Right hip arthroplasty  Findings are stable  There is no new fracture or dislocation  No degenerative changes  No lytic or blastic lesions are seen  Central pelvic IUD       IMPRESSION:   Intact left femoral nail   Intact right hip arthroplasty   No acute osseous abnormality  Stable appearance       Workstation performed: [id number]     Signed by:   Rain Joy MD   11/12/17     (1) URINALYSIS w URINE C/S REFLEX (will reflex a microscopy if leukocytes, occult blood, or nitrites are not within normal limits) [id number] 06:37AM Renard Diana Order Number: [id number]     Test Name Result Flag Reference   COLOR Yellow     CLARITY Clear     PH UA 7 0  4 5-8 0   LEUKOCYTE ESTERASE UA Negative  Negative   NITRITE UA Negative  Negative   PROTEIN UA Negative mg/dl  Negative   GLUCOSE UA Negative mg/dl  Negative   KETONES UA Negative mg/dl  Negative   UROBILINOGEN UA 0 2 E U /dl  0 2, 1 0 E U /dl   BILIRUBIN UA Negative  Negative   BLOOD UA Negative  Negative   SPECIFIC GRAVITY UA 1 004  1 003-1 030       Plan  Aftercare following surgery of the musculoskeletal system    · Pre Op History And Physical; Status:Active;  Requested for:03Oct2017;   Preop examination    · (1) URINALYSIS w URINE C/S REFLEX (will reflex a microscopy if leukocytes, occult  blood, or nitrites are not within normal limits); Status:Complete;   Done: 46ZYT8546  06:37AM

## 2018-01-23 NOTE — MISCELLANEOUS
Assessment    1  Hospital discharge follow-up (V67 59) (Z09)   2  Closed fracture of femur, neck (820 8) (S79 080B)    Plan  Aftercare following surgery of the musculoskeletal system    · From  OxyCODONE HCl - 5 MG Oral Tablet TAKE 1 TABLET Every 4 hours PRN  pain To OxyCODONE HCl - 5 MG Oral Tablet TAKE 1 TABLET Every 12 hours PRN severe  pain   Rx By: Arcadio Mcdaniels; Dispense: 14 Days ; #:28 Tablet; Refill: 0; For: Aftercare following surgery of the musculoskeletal system; RUDOLPH = N; Print Rx; Msg to Pharmacy: continuation of treatment  Anxiety    · DiazePAM 5 MG Oral Tablet; TAKE 1 TABLET AT BEDTIME   Rx By: Arcadio Mcdaniels; Dispense: 30 Days ; #:30 Tablet; Refill: 0; For: Anxiety; RUDOLPH = N; Print Rx    Discussion/Summary  Discussion Summary:   Improving  Area healing well  Discussed use of compression stockings during the day and elevate leg to avoid edema  Pain better controlled with oxycodone 5 milligrams twice a day and Motrin 3 times a day  Discussed start taking oxycodone q h s  but only taking the morning the days of physical therapy, to start weaning off  Continue with Motrin 3 times a day  Follow with the orthopedic surgeon as scheduled  Counseling Documentation With Imm: The patient was counseled regarding instructions for management, risk factor reductions, impressions  total time of encounter was Twenty-five minutes  Medication SE Review and Pt Understands Tx: Possible side effects of new medications were reviewed with the patient/guardian today  The treatment plan was reviewed with the patient/guardian   The patient/guardian understands and agrees with the treatment plan      Chief Complaint  Chief Complaint Free Text Note Form: Patient here for NANCY WOOD   Patient was hospitalized for a left hip replacement 12/4/17 to 12/6/17  Patient went to [facility] and was discharged 12/11/17   Patient says she's feeling ok but still have pain        History of Present Illness  TCM Communication [facility] SPECIALTY [facility] Jin: The patient is being contacted for follow-up after hospitalization  She was hospitalized at [facility]  The date of admission: 12/1/2017, date of discharge: 12/11/2017  Diagnosis: (L) JONATHAN  She was discharged to home  Medications reviewed and updated today  She scheduled a follow up appointment  The patient is currently experiencing symptoms  Left leg pain Counseling was provided to the patient  Communication performed and completed by [place]   HPI: Patient presents for hospital follow-up after left hip total arthroplasty  She has been doing well, she spent 2 weeks in rehab  She is not doing physical therapy at home twice a week  She is still experiencing some moderate pain during the day a moderate to severe at night  She is taking oxycodone 5 milligrams twice a day  She takes Motrin during the day  She ran out of oxycodone two days ago, but has had had trouble sleeping due to pain  Review of Systems  Complete-Female:   Constitutional: No fever, no chills, feels well, no tiredness, no recent weight gain or weight loss  Cardiovascular: No complaints of slow heart rate, no fast heart rate, no chest pain, no palpitations, no leg claudication, no lower extremity edema  Respiratory: No complaints of shortness of breath, no wheezing, no cough, no SOB on exertion, no orthopnea, no PND  Gastrointestinal: No complaints of abdominal pain, no constipation, no nausea or vomiting, no diarrhea, no bloody stools  Musculoskeletal: limb pain, but as noted in HPI  Active Problems    1  Anxiety (300 00) (F41 9)   2  Closed fracture of femur, neck (820 8) (S72 009A)   3  Counseling for sexually transmitted disease (V65 45) (Z70 8)   4  Elevated alkaline phosphatase level (790 5) (R74 8)   5  Encounter for screening colonoscopy (V76 51) (Z12 11)   6  Encounter for screening mammogram for breast cancer (V76 12) (Z12 31)   7  GERD (gastroesophageal reflux disease) (530 81) (K21 9)   8   Hair loss (704 00) (L65 9)   9  Hospital discharge follow-up (V67 59) (Z09)   10  Hypothyroidism (244 9) (E03 9)   11  Influenza vaccine needed (V04 81) (Z23)   12  Left hip pain (719 45) (M25 552)   13  Need for vaccination with 13-polyvalent pneumococcal conjugate vaccine (V03 82) (Z23)   14  Osteoporosis (733 00) (M81 0)   15  Peripheral vascular disease (443 9) (I73 9)   16  Preop examination (V72 84) (Z01 818)   17  Primary localized osteoarthritis of left hip (715 15) (M16 12)   18  RBBB (426 4) (I45 10)   19  Screening for osteoporosis (V82 81) (Z13 820)   20  Urinary tract infection due to Proteus (599 0,041 6) (N39 0,B96 4)    Past Medical History    1  History of appendicitis (V12 79) (Z87 19)   2  History of depression (V11 8) (Z86 59)   3  History of headache (V13 89) (Z87 898)   4  History of hyperthyroidism (V12 29) (Z86 39)   5  History of varicella (V12 09) (Z86 19)   6  History of Polio (045 90) (A80 9)    Surgical History    1  History of Appendectomy   2  History of Tonsillectomy   3  History of Total Hip Replacement   4  History of Treatment Of Hip Fracture    Family History  Mother    1  Family history of rheumatoid arthritis (V17 7) (Z82 61)  Sister    2  Family history of rheumatoid arthritis (V17 7) (Z82 61)  Brother    3  Family history of malignant neoplasm (V16 9) (Z80 9)  Family History    4  Family history of No cardiac disease    Social History    · Always uses seat belt   · Caffeine use (V49 89) (F15 90)   · Denied: History of Drug use   · Never a smoker   · No alcohol use   · Non-smoker (V49 89) (Z78 9)   · One child   · Retired   · [employer]   · Single  Social History Reviewed: The social history was reviewed and updated today  Current Meds   1  Alendronate Sodium 70 MG Oral Tablet; TAKE 1 TABLET ONCE WEEKLY; Therapy: 03YNL3561 to (Evaluate:20Jan2018)  Requested for: 28Oct2017; Last   Rx:28Oct2017 Ordered   2  Centrum Silver Oral Tablet; TAKE 1 TABLET DAILY;    Therapy: 52WGL2475 to Recorded   3  DiazePAM 5 MG Oral Tablet; TAKE 1 TABLET AT BEDTIME; Therapy: 20XVU9835 to (Evaluate:11Nov2017); Last Rx:12Oct2017 Ordered   4  Enoxaparin Sodium 40 MG/0 4ML Subcutaneous Solution; 1  0 4ml syringe daily for 28   days; Therapy: 36HAO6337 to (Evaluate:08Zuy5345)  Requested for: 00YKK1086; Last   Rx:24Nov2017 Ordered   5  Famotidine 20 MG Oral Tablet; TAKE ONE TABLET BY MOUTH EVERY 12 HOURS; Therapy: 72ZVO9502 to (Evaluate:21Apr2017)  Requested for: 90Xbc5630; Last   Rx:37Qdf8814 Ordered   6  Folic Acid 1 MG Oral Tablet; TAKE 1 TABLET DAILY; Therapy: 86Njw0279 to [phone])  Requested for: 30Oct2017; Last   Rx:30Oct2017 Ordered   7  Gabapentin 100 MG Oral Capsule; TAKE ONE CAPSULE BY MOUTH THREE TIMES A   DAY; Therapy: 70ASC4565 to (Evaluate:32Oze0366)  Requested for: 07UPZ4460; Last   Rx:22Jun2017 Ordered   8  Ibuprofen 600 MG Oral Tablet; TAKE 1 TABLET Every 8 hours; Therapy: 95SQD5416 to (Evaluate:11Mar2017)  Requested for: 43USQ1532; Last   Rx:01Mar2017 Ordered   9  Levothyroxine Sodium 25 MCG Oral Tablet; take one tablet by mouth every day; Therapy: 27CPZ4351 to (Evaluate:53Sur2252)  Requested for: 00Jlk5291; Last   Rx:50Njc8867 Ordered   10  Nortriptyline HCl - 10 MG Oral Capsule; TAKE ONE CAPSULE BY MOUTH AT BEDTIME; Therapy: 19DOM2938 to (Evaluate:27Sik5015)  Requested for: 39CQF6477; Last    Rx:22Jun2017 Ordered   11  Nortriptyline HCl - 25 MG Oral Capsule; TAKE ONE CAPSULE BY MOUTH AT BEDTIME; Therapy: 25OQR4542 to (Evaluate:95Hhw1100)  Requested for: 56KDX9399; Last    Rx:22Jun2017 Ordered   12  OxyCODONE HCl - 5 MG Oral Tablet; TAKE 1 TO 2 TABLETS EVERY 6 HOURS AS    NEEDED FOR PAIN;    Therapy: 74LKQ4314 to (Evaluate:14Nov2017); Last Rx:09Nov2017 Ordered   13  Vitamin C Plus 1000 MG Oral Tablet; TAKE 1 TABLET DAILY; Therapy: 09GKL4359 to Recorded   14  Vitamin D 1000 UNIT Oral Tablet; TAKE 1 TABLET DAILY;     Therapy: 48ZWM0239 to (Evaluate:13Dru0027) Recorded   15  Vitamin E 400 UNIT Oral Tablet; take 2 tablet daily; Therapy: 37KKH9141 to Recorded  Medication List Reviewed: The medication list was reviewed and updated today  Allergies    1  No Known Drug Allergies    Vitals  Signs   Recorded: 02Hsk6982 09:39AM   Temperature: 98 F  Heart Rate: 81  Respiration: 18  Systolic: 859  Diastolic: 72  Height: 5 ft 1 02 in  Weight: 104 lb   BMI Calculated: 19 64  BSA Calculated: 1 43  O2 Saturation: 99  Pain Scale: 7    Physical Exam    Constitutional   General appearance: No acute distress, well appearing and well nourished  Pulmonary   Respiratory effort: No increased work of breathing or signs of respiratory distress  Auscultation of lungs: Clear to auscultation  Cardiovascular   Auscultation of heart: Normal rate and rhythm, normal S1 and S2, without murmurs  Musculoskeletal   Gait and station: Abnormal   Uses rolling walker  Inspection/palpation of joints, bones, and muscles: Abnormal   Left hip-incision healing well no signs of infection  Future Appointments    Date/Time Provider Specialty Site   01/24/2018 01:00 PM JACEY العلي 476   01/11/2018 02:45 PM JACEY Leyva   Orthopedic Surgery [address]     Signatures   Electronically signed by : JACEY Boles ; Dec 21 2017 10:18AM EST                       (Author)

## 2018-01-24 VITALS — DIASTOLIC BLOOD PRESSURE: 69 MMHG | HEIGHT: 61 IN | SYSTOLIC BLOOD PRESSURE: 107 MMHG | HEART RATE: 77 BPM

## 2018-01-24 VITALS
HEIGHT: 61 IN | BODY MASS INDEX: 19.63 KG/M2 | HEART RATE: 75 BPM | SYSTOLIC BLOOD PRESSURE: 159 MMHG | DIASTOLIC BLOOD PRESSURE: 87 MMHG | WEIGHT: 104 LBS

## 2018-01-24 VITALS — HEART RATE: 78 BPM | DIASTOLIC BLOOD PRESSURE: 70 MMHG | HEIGHT: 61 IN | SYSTOLIC BLOOD PRESSURE: 114 MMHG

## 2018-01-24 NOTE — PROGRESS NOTES
Assessment   1  Primary localized osteoarthritis of left hip (292 15) (M16 12)    Plan  Left hip pain, Primary localized osteoarthritis of left hip    · (1) CBC/PLT/DIFF; Status:Active - Retrospective Authorization; Requested for:18Icm4250;    Primary localized osteoarthritis of left hip    · (1) APTT; Status:Active - Retrospective Authorization; Requested for:98Biy4898;    · (1) COMPREHENSIVE METABOLIC PANEL; Status:Active - Retrospective Authorization; Requested for:27Jul2017;    · (1) HEMOGLOBIN A1C; Status:Active - Retrospective Authorization; Requested  for:27Jul2017;    · (1) PT WITH INR; Status:Active - Retrospective Authorization; Requested for:27Jul2017;    · (1) TYPE & SCREEN; Status:Active - Retrospective Authorization; Requested  for:27Jul2017;   Pt currently receiving a biologic? : No  Is the patient pregnant or have they been in the last 90 days? : No  Has the patient been transfused in the last 3 months? : No  Date of Surgery : 06Sep2017  Where is the surgery scheduled? : [facility] or PreOp : PreOp   · (1) URINALYSIS w URINE C/S REFLEX (will reflex a microscopy if leukocytes, occult  blood, or nitrites are not within normal limits); Status:Active - Retrospective Authorization; Requested for:27Jul2017;    · * XR CHEST PA & LATERAL; Status:Active - Retrospective Authorization; Requested  for:19Moz0871;     Discussion/Summary  Treatment plan includes  total hip arthroplasty1  1   Patient discussion:  discussed with the patient1  ,  discussed with the patient's family1  1   Patient seen by and discussed with Dr Lauren Toth  Plan per Dr Lauren Toth  Discussed with patient and family the risks and benefits of surgical intervention  Discussed the additional risks as she needs a conversion from and IM nail  Reviewed PAT requirements  Patient signed consent form to schedule Left Hip conversion to JONATHAN   Patient to follow up after surgery1    The  patient1  ,  patient's family1  was counseled regarding1  instructions for management1 , risks and benefits of treatment options1   The risks and benefits of surgery were reviewed with the patient/guardian inclusive of but not limited to infection, failure to alleviate discomfort, failure of procedure, nerve injury, stiffness, blood clots and need for further surgery1    Patient is able to ARABELLA MARI  [facility]    The treatment plan was reviewed with the patient/guardian  The patient/guardian understands and agrees with the treatment plan1        1 Amended By: Bryon Aviles; Jul 27 2017 12:04 PM EST    Chief Complaint   1  Hip Pain  Left Hip Pain      History of Present Illness  Hip Problem: The patient is being seen for follow-up of a hip problem  HPI: Patient is a 67year old female presenting to the office for follow up on left hip pain  Patient states that the pain improved a little with the corticosteroid injections, however, it only lasted about 2 weeks  Patient does not want to continue with any additional hip injections  Patient states that the pain in her hip is rated about 7/10  The pain improves with rest and Motrin (2-3x per day)  It increases with activities  Patient needs to ambulate with a walker or a cane, and she has poor endurance  Patient denies any numbness/tingling down the leg  She is s/p IM nail left hip s/p femoral neck fracture  She is also s/p right hip replacement  She does have some slight residual cutaneous numbness since surgery  Review of Systems    Constitutional: No fever, no chills, feels well, no tiredness, no recent weight gain or loss  Eyes: No complaints of eyesight problems, no red eyes  ENT: no loss of hearing, no nosebleeds, no sore throat  Cardiovascular: No complaints of chest pain, no palpitations, no leg claudication or lower extremity edema  Respiratory: no compliants of shortness of breath, no wheezing, no cough     Gastrointestinal: no complaints of abdominal pain, no constipation, no nausea or diarrhea, no vomiting, no bloody stools  Genitourinary: no complaints of dysuria, no incontinence  Musculoskeletal: as noted in HPI  Integumentary: no complaints of skin rash or lesion, no itching or dry skin, no skin wounds  Neurological: no complaints of headache, no confusion, no numbness or tingling, no dizziness  Endocrine: No complaints of muscle weakness, no feelings of weakness, no frequent urination, no excessive thirst    Psychiatric: No suicidal thoughts, no anxiety, no feelings of depression  ROS reviewed  Active Problems   1  Aftercare following surgery of the musculoskeletal system (V58 78) (Z47 89)  2  Anxiety (300 00) (F41 9)  3  Closed fracture of femur, neck (820 8) (S72 009A)  4  Counseling for sexually transmitted disease (V65 45) (Z70 8)  5  Elevated alkaline phosphatase level (790 5) (R74 8)  6  Encounter for screening colonoscopy (V76 51) (Z12 11)  7  Encounter for screening mammogram for breast cancer (V76 12) (Z12 31)  8  GERD (gastroesophageal reflux disease) (530 81) (K21 9)  9  Hair loss (704 00) (L65 9)  10  Hypothyroidism (244 9) (E03 9)  11  Influenza vaccine needed (V04 81) (Z23)  12  Left hip pain (719 45) (M25 552)  13  Need for vaccination with 13-polyvalent pneumococcal conjugate vaccine (V03 82) (Z23)  14  Osteoporosis (733 00) (M81 0)  15  Peripheral vascular disease (443 9) (I73 9)  16  Primary localized osteoarthritis of left hip (715 15) (M16 12)  17  Screening for osteoporosis (V82 81) (Z13 820)    Past Medical History    · History of appendicitis (V12 79) (Z87 19)   · History of depression (V11 8) (Z86 59)   · History of headache (V13 89) (M31 687)   · History of hyperthyroidism (V12 29) (Z86 39)   · History of varicella (V12 09) (Z86 19)   · History of Polio (045 90) (A80 9)    The active problems and past medical history were reviewed and updated today        Surgical History    · History of Total Hip Replacement   · History of Treatment Of Hip Fracture    The surgical history was reviewed and updated today  Family History  Mother    · No pertinent family history  Father    · No pertinent family history  Sister    · No pertinent family history  Brother    · Family history of malignant neoplasm (V16 9) (Z80 9)    The family history was reviewed and updated today  Social History    · Always uses seat belt   · Caffeine use (V49 89) (F15 90)   · Denied: History of Drug use   · Never a smoker   · No alcohol use   · Non-smoker (V49 89) (Z78 9)   · One child   · Retired   · [employer]   · Single  The social history was reviewed and updated today  Current Meds  1  Alendronate Sodium 70 MG Oral Tablet; Take 1 tablet once weekly; Therapy: 54YMC0068 to (Last Rx:06Dsx1988)  Requested for: 53XQG7241 Ordered  2  Centrum Silver Oral Tablet; TAKE 1 TABLET DAILY; Therapy: 09YYG1996 to Recorded  3  DiazePAM 5 MG Oral Tablet; TAKE 1 TABLET AT BEDTIME; Therapy: 63VLL8050 to (Evaluate:60Uya1027); Last Rx:16Jun2017 Ordered  4  Famotidine 20 MG Oral Tablet; TAKE ONE TABLET BY MOUTH EVERY 12 HOURS; Therapy: 26VLH7125 to (Evaluate:21Apr2017)  Requested for: 89Vhk0886; Last   Rx:45Unl5045 Ordered  5  Folic Acid 1 MG Oral Tablet; TAKE 1 TABLET DAILY; Therapy: 50Kgy1241 to (Evaluate:56Iuj6301)  Requested for: 92IRV0722; Last   Rx:07Txg6366 Ordered  6  Gabapentin 100 MG Oral Capsule; TAKE ONE CAPSULE BY MOUTH THREE TIMES A   DAY; Therapy: 00XMC9225 to (Evaluate:95Psf5126)  Requested for: 36KFK9186; Last   Rx:22Jun2017 Ordered  7  Ibuprofen 600 MG Oral Tablet; TAKE 1 TABLET Every 8 hours; Therapy: 07AWB8656 to (Evaluate:11Mar2017)  Requested for: 75YUD3083; Last   Rx:01Mar2017 Ordered  8  Levothyroxine Sodium 25 MCG Oral Tablet; TAKE 1 TABLET DAILY; Therapy: 25MYI6840 to (Evaluate:11Jun2017)  Requested for: 30Ibh0684; Last   Rx:50Jii2310 Ordered  9  Nortriptyline HCl - 10 MG Oral Capsule; TAKE ONE CAPSULE BY MOUTH AT BEDTIME;    Therapy: 12WTH0915 to (Evaluate:38Qti7022)  Requested for: 82ZHV9517; Last   Rx:22Jun2017 Ordered  10  Nortriptyline HCl - 25 MG Oral Capsule; TAKE ONE CAPSULE BY MOUTH AT BEDTIME; Therapy: 37XGA6571 to (Evaluate:62Tcq5519)  Requested for: 79SUV2594; Last    Rx:22Jun2017 Ordered  11  Vitamin C Plus 1000 MG Oral Tablet; TAKE 1 TABLET DAILY; Therapy: 05RJT8961 to Recorded  12  Vitamin D 1000 UNIT Oral Tablet; TAKE 1 TABLET DAILY; Therapy: 84UWV9914 to (Evaluate:11Rqr4493) Recorded  13  Vitamin E 400 UNIT Oral Tablet; take 2 tablet daily; Therapy: 11RVM5224 to Recorded    The medication list was reviewed and updated today  Allergies   1  No Known Drug Allergies    Vitals   Recorded: 27Jul2017 08:37AM Recorded: 27Jul2017 08:36AM   Heart Rate 78    Systolic 353    Diastolic 81    Height  5 ft    Weight  106 lb 2 08 oz   BMI Calculated  20 73   BSA Calculated  1 42     Physical Exam    Left Hip: Appearance: Normal  Tenderness: None  Flexion: restricted AROM  Internal rotation: normal AROM  External rotation: normal AROM  Abduction: restricted AROM  Adduction: restricted AROM  Flexion was 4/5  Abduction was 4/5  Adduction was 4/5  Right Hip: Appearance: Normal  Tenderness: None  Flexion: restricted AROM  Internal rotation: normal AROM  External rotation: normal AROM  Abduction: restricted AROM  Adduction: restricted AROM  Motor: 4/5 flexion, 4/5 abduction and 4/5 adduction     Constitutional - General appearance: Normal    Musculoskeletal - Gait and station: Abnormal  Muscle strength/tone: Normal  ambulating with walker   Cardiovascular - Pulses: Normal  Examination of extremities for edema and/or varicosities: Normal    Skin - Skin and subcutaneous tissue: Normal    Neurologic - Sensation: Normal    Psychiatric - Orientation to person, place, and time: Normal  Mood and affect: Normal    Eyes   Conjunctiva and lids: Normal     Pupils and irises: Normal        Surgery Scheduling Form  Surgery Schedule Form [facility] Standard: Location: [place]   Confirmation Number:   PROCEDURE DETAILS   Procedure Date:   Requested Time:   Surgeon: Raffy Gonzalez   Co-Surgeon:   [facility] Required:   Procedure: Conversion of IM nail to Total Hip Arthroplasty with anterior approach   Bed: Med/Surg Bed Required   Laterality/Level: Left  Case Length:   Anticipated frozen section:   Anesthesia: general     Procedure Codes: 42698   Pre-op diagnosis: Primary Osteoarthritis of Hip   Diagnosis Code(s): M16 12   Equipment: C-Arm/Xray and Cell Saver   Equipment Needs: [place] Table  awaiting IM nail information   Implants: DePuy     Is the patient able to walk up a flight of stairs, walk up a hill or do heavy housework WITHOUT having chest pain or shortness of breath? REGISTRATION & FINANCIAL CLEARANCE   FA Initials:   Insurance:   Policy Number: Group Number:     PRE-ADMISSION TESTING/CLINICAL INFORMATION   PAT Location: [place]   Type & Screen needed  CONSULTS NEEDED:   Anesthesia Consult:   Medical Consult:   Cardiac Consult:    ALLERGIES AND ALERTS     Latex Allergy:   Penicillin Allergy: NO   Malignant Hyperthermia:   Diabetic Patient: NO     ERAS Patient:   COMMENTS   Scheduling Information Provided By:     CASE MANAGEMENT:   Discharge Needs: Home Care, Lovenox Management and With home PT  Future Appointments    Date/Time Provider Specialty Site   08/21/2017 01:30 PM JACEY Mojica [address]   11/14/2017 02:00 PM JACEY Mojica 476   09/06/2017 11:50 AM JACEY Aguilar  [address] [place] [address]   09/14/2017 09:30 AM JACEY Aguilar  Orthopedic Surgery [facility]   09/21/2017 09:15 AM JACEY Aguilar   Orthopedic Surgery [address]     Signatures   Electronically signed by : Sonal Elias [facility]; Jul 27 2017 12:04PM EST                       (Author) Electronically signed by :  JACEY Huitron ; Jul 27 2017  1:49PM EST                       (Author)

## 2018-01-26 DIAGNOSIS — F41.9 ANXIETY: Primary | ICD-10-CM

## 2018-01-26 RX ORDER — DIAZEPAM 5 MG/1
5 TABLET ORAL EVERY 12 HOURS PRN
Qty: 60 TABLET | Refills: 0 | Status: SHIPPED | OUTPATIENT
Start: 2018-01-26 | End: 2018-03-12 | Stop reason: SDUPTHER

## 2018-01-26 RX ORDER — DIAZEPAM 5 MG/1
1 TABLET ORAL
COMMUNITY
Start: 2014-11-02 | End: 2018-01-26 | Stop reason: SDUPTHER

## 2018-02-21 DIAGNOSIS — E03.9 HYPOTHYROIDISM, UNSPECIFIED TYPE: Primary | ICD-10-CM

## 2018-02-21 RX ORDER — LEVOTHYROXINE SODIUM 0.03 MG/1
TABLET ORAL
Qty: 30 TABLET | Refills: 5 | Status: SHIPPED | OUTPATIENT
Start: 2018-02-21 | End: 2018-07-27 | Stop reason: SDUPTHER

## 2018-02-22 ENCOUNTER — HOSPITAL ENCOUNTER (OUTPATIENT)
Dept: RADIOLOGY | Facility: HOSPITAL | Age: 73
Discharge: HOME | End: 2018-02-22
Attending: ORTHOPAEDIC SURGERY
Payer: MEDICARE

## 2018-02-22 ENCOUNTER — OFFICE VISIT (OUTPATIENT)
Dept: OBGYN CLINIC | Facility: HOSPITAL | Age: 73
End: 2018-02-22

## 2018-02-22 VITALS
WEIGHT: 105 LBS | SYSTOLIC BLOOD PRESSURE: 158 MMHG | DIASTOLIC BLOOD PRESSURE: 90 MMHG | BODY MASS INDEX: 19.83 KG/M2 | HEART RATE: 73 BPM | HEIGHT: 61 IN

## 2018-02-22 DIAGNOSIS — Z12.11 SCREENING FOR COLON CANCER: ICD-10-CM

## 2018-02-22 DIAGNOSIS — Z47.1 AFTERCARE FOLLOWING LEFT HIP JOINT REPLACEMENT SURGERY: Primary | ICD-10-CM

## 2018-02-22 DIAGNOSIS — Z96.642 AFTERCARE FOLLOWING LEFT HIP JOINT REPLACEMENT SURGERY: Primary | ICD-10-CM

## 2018-02-22 PROCEDURE — 99024 POSTOP FOLLOW-UP VISIT: CPT | Performed by: ORTHOPAEDIC SURGERY

## 2018-02-22 PROCEDURE — 73502 X-RAY EXAM HIP UNI 2-3 VIEWS: CPT

## 2018-02-22 NOTE — PROGRESS NOTES
67 y o female is 3 months status post left conversion total hip arthroplasty on 12/06/2017  She is experiencing minimal pain  She feels as though she has better endurance now  She is ambulating with the assistance of a walker  She is experiencing numbness in her calf and shin region in the left leg  She is also having occasional knee pain on the left  She has been released from physical therapy      Review of Systems  Review of systems negative unless otherwise specified in HPI    Past Medical History  Past Medical History:   Diagnosis Date    Anxiety     Disease of thyroid gland     HYPO    Femur neck fracture (HCC)     GERD (gastroesophageal reflux disease)     Osteoporosis     Polio     PVD [facility]peripheral vascular disease) ([facility] [address] )     Right BBB/left ant fasc block     UTI (urinary tract infection)     Varicella infection        Past Surgical History  Past Surgical History:   Procedure Laterality Date    APPENDECTOMY      HIP ARTHROPLASTY Left 11/27/2017    Procedure: REMOVAL IM NAIL CONVERSION TO TOTAL HIP ARTHROPLASTY POSTERIOR APPROACH;  Surgeon: Gisele Amaral MD;  [facility];  Service: Orthopedics    HIP SURGERY      both hips replaced;2013 left ,2014 right    JOINT REPLACEMENT      HIP TOTAL    [place] CONV PREV HIP SURG TO TOT HIP ARTHROPLAS Left 10/4/2017    Procedure: Hareware removal of left hip;  Surgeon: Gisele Amaral MD;  [facility];  Service: Orthopedics    REVISION TOTAL HIP ARTHROPLASTY Right     TONSILLECTOMY         Current Medications  Current Outpatient Prescriptions on File Prior to Visit   Medication Sig Dispense Refill    acetaminophen (TYLENOL) 650 mg CR tablet Take 1 tablet by mouth every 8 (eight) hours as needed for mild pain 30 tablet 0    alendronate (FOSAMAX) 70 mg tablet Take 70 mg by mouth every 7 days Sunday       Ascorbic Acid (VITAMIN C) 1000 MG tablet Take 1,000 mg by mouth daily      cholecalciferol (VITAMIN D3) 1,000 units tablet Take 1,000 Units by mouth daily      diazepam (VALIUM) 5 mg tablet Take 1 tablet by mouth every 12 (twelve) hours as needed for anxiety 60 tablet 0    docusate sodium (COLACE) 100 mg capsule Take 1 capsule by mouth 2 (two) times a day 10 capsule 0    enoxaparin (LOVENOX) 40 mg/0 4 mL Inject 0 4 mL under the skin daily for 30 days 12 mL 0    folic acid (FOLVITE) 1 mg tablet Take 1 mg by mouth daily      gabapentin (NEURONTIN) 100 mg capsule Take 100 mg by mouth 3 (three) times a day      ibuprofen (MOTRIN IB) 200 mg tablet Take 600 mg by mouth every 8 (eight) hours as needed        levothyroxine 25 mcg tablet TAKE ONE TABLET BY MOUTH EVERY DAY 30 tablet 5    Multiple Vitamins-Minerals (CENTRUM SILVER PO) Take 1 tablet by mouth daily      Nortriptyline HCl (PAMELOR PO) Take 35 mg by mouth daily at bedtime      oxyCODONE (ROXICODONE) 5 mg immediate release tablet Take 1-2 tablets PO q 4 hours PRN Pain 60 tablet 0    Psyllium (METAMUCIL FIBER PO) Take 1 packet by mouth 3 (three) times a day        Vitamin E 400 units TABS Take 400 Units by mouth 2 (two) times a day       No current facility-administered medications on file prior to visit  Recent Labs [facility])    0  Lab Value Date/Time   HCT 30 5 (L) 11/29/2017 0437   HCT 36 8 12/16/2014 0727   HGB 10 0 (L) 11/29/2017 0437   HGB 11 9 12/16/2014 0727   WBC 10 85 (H) 11/29/2017 0437   WBC 5 60 12/16/2014 0727   INR 0 95 11/09/2017 1217   INR 0 96 10/13/2014 0608   GLUCOSE 97 11/30/2017 0453   GLUCOSE 89 10/30/2014 0626   HGBA1C 5 4 08/22/2017 1445         Physical exam  · General: Awake, Alert, Oriented  · Eyes: Pupils equal, round and reactive to light  · Heart: regular rate and rhythm  · Lungs: No audible wheezing  · Abdomen: soft  left Lower extremity  · Patient ambulates with the assistance of a walker  · She is able to flex her hip  · Motor strength 5/5  · Extends knee      Imaging  X-rays were reviewed   Implants in good positioning, no signs of loosening  Procedure  None    Assessment/Plan:   67 y  o female we will see her back in 3 months  She was told that if her numbness gets worse or she experiences substantial weakness to come in sooner

## 2018-03-12 DIAGNOSIS — F41.9 ANXIETY: ICD-10-CM

## 2018-03-12 RX ORDER — DIAZEPAM 5 MG/1
5 TABLET ORAL EVERY 12 HOURS PRN
Qty: 60 TABLET | Refills: 0 | OUTPATIENT
Start: 2018-03-12 | End: 2018-05-22 | Stop reason: SDUPTHER

## 2018-04-10 DIAGNOSIS — G25.81 RLS (RESTLESS LEGS SYNDROME): ICD-10-CM

## 2018-04-10 RX ORDER — FOLIC ACID 1 MG/1
TABLET ORAL
Qty: 30 TABLET | Refills: 2 | Status: SHIPPED | OUTPATIENT
Start: 2018-04-10 | End: 2018-06-23 | Stop reason: SDUPTHER

## 2018-04-18 DIAGNOSIS — M81.0 OSTEOPOROSIS, UNSPECIFIED OSTEOPOROSIS TYPE, UNSPECIFIED PATHOLOGICAL FRACTURE PRESENCE: Primary | ICD-10-CM

## 2018-04-18 RX ORDER — ALENDRONATE SODIUM 70 MG/1
TABLET ORAL
Qty: 4 TABLET | Refills: 2 | Status: SHIPPED | OUTPATIENT
Start: 2018-04-18 | End: 2018-06-23 | Stop reason: SDUPTHER

## 2018-05-07 ENCOUNTER — HOSPITAL ENCOUNTER (EMERGENCY)
Facility: HOSPITAL | Age: 73
Discharge: HOME | End: 2018-05-07
Attending: EMERGENCY MEDICINE
Payer: MEDICARE

## 2018-05-07 VITALS
DIASTOLIC BLOOD PRESSURE: 79 MMHG | TEMPERATURE: 97.6 F | WEIGHT: 103 LBS | BODY MASS INDEX: 19.45 KG/M2 | RESPIRATION RATE: 16 BRPM | HEART RATE: 76 BPM | SYSTOLIC BLOOD PRESSURE: 188 MMHG | OXYGEN SATURATION: 100 %

## 2018-05-07 DIAGNOSIS — R20.0 BILATERAL HAND NUMBNESS: Primary | ICD-10-CM

## 2018-05-07 DIAGNOSIS — M25.50 JOINT PAIN: ICD-10-CM

## 2018-05-07 PROCEDURE — 96372 THER/PROPH/DIAG INJ SC/IM: CPT

## 2018-05-07 PROCEDURE — 99284 EMERGENCY DEPT VISIT MOD MDM: CPT

## 2018-05-07 RX ORDER — NAPROXEN 500 MG/1
500 TABLET ORAL 2 TIMES DAILY WITH MEALS
Qty: 30 TABLET | Refills: 0 | Status: SHIPPED | OUTPATIENT
Start: 2018-05-07 | End: 2018-05-16

## 2018-05-07 RX ORDER — ACETAMINOPHEN 325 MG/1
650 TABLET ORAL ONCE
Status: COMPLETED | OUTPATIENT
Start: 2018-05-07 | End: 2018-05-07

## 2018-05-07 RX ORDER — KETOROLAC TROMETHAMINE 30 MG/ML
15 INJECTION, SOLUTION INTRAMUSCULAR; INTRAVENOUS ONCE
Status: COMPLETED | OUTPATIENT
Start: 2018-05-07 | End: 2018-05-07

## 2018-05-07 RX ADMIN — KETOROLAC TROMETHAMINE 15 MG: 30 INJECTION, SOLUTION INTRAMUSCULAR at 06:20

## 2018-05-07 RX ADMIN — ACETAMINOPHEN 650 MG: 325 TABLET ORAL at 06:21

## 2018-05-07 NOTE — DISCHARGE INSTRUCTIONS
Arthralgia   WHAT YOU NEED TO KNOW:   Arthralgia is pain in one or more joints, with no inflammation  It may be short-term and get better within 6 to 8 weeks  Arthralgia can be an early sign of arthritis  Arthralgia may be caused by a medical condition, such as a hormone disorder or a tumor  It may also be caused by an infection or injury  DISCHARGE INSTRUCTIONS:   Medicines: The following medicines may  be ordered for you:  · Acetaminophen  decreases pain  Ask how much to take and how often to take it  Follow directions  Acetaminophen can cause liver damage if not taken correctly  · NSAIDs  decrease pain and prevent swelling  Ask your healthcare provider which medicine is right for you  Ask how much to take and when to take it  Take as directed  NSAIDs can cause stomach bleeding and kidney problems if not taken correctly  · Pain relief cream  decreases pain  Use this cream as directed  · Take your medicine as directed  Contact your healthcare provider if you think your medicine is not helping or if you have side effects  Tell him of her if you are allergic to any medicine  Keep a list of the medicines, vitamins, and herbs you take  Include the amounts, and when and why you take them  Bring the list or the pill bottles to follow-up visits  Carry your medicine list with you in case of an emergency  Follow up with your healthcare provider or specialist as directed:  Write down your questions so you remember to ask them during your visits  Self-care:   · Apply heat  to help decrease pain  Use a heating pad or heat wrap  Apply heat for 20 to 30 minutes every 2 hours for as many days as directed  · Rest  as much as possible  Avoid activities that cause joint pain  · Apply ice  to help decrease swelling and pain  Ice may also help prevent tissue damage  Use an ice pack, or put crushed ice in a plastic bag   Cover it with a towel and place it on your painful joint for 15 to 20 minutes every hour or as directed  · Support  the joint with a brace or elastic wrap as directed  · Elevate  your joint above the level of your heart as often as you can to help decrease swelling and pain  Prop your painful joint on pillows or blankets to keep it elevated comfortably  · Lose weight  if you are overweight  Extra weight can put pressure on your joints and cause more pain  Ask your healthcare provider how much you should weigh  Ask him to help you create a weight loss plan  · Exercise  regularly to help improve joint movement and to decrease pain  Ask about the best exercise plan for you  Low-impact exercises can help take the pressure off your joints  Examples are walking, swimming, and water aerobics  Physical therapy:  A physical therapist teaches you exercises to help improve movement and strength, and to decrease pain  Ask your healthcare provider if physical therapy is right for you  Contact your healthcare provider or specialist if:   · You have a fever  · You continue to have joint pain that cannot be relieved with heat, ice, or medicine  · You have pain and inflammation around your joint  · You have questions or concerns about your condition or care  Return to the emergency department if:   · You have sudden, severe pain when you move your joint  · You have a fever and shaking chills  · You cannot move your joint  · You lose feeling on the side of your body where you have the painful joint  © 2017 [address]  Information is for End User's use only and may not be sold, redistributed or otherwise used for commercial purposes  All illustrations and images included in CareNotes® are the copyrighted property of [employer]  or [employer]  The above information is an  only  It is not intended as medical advice for individual conditions or treatments   Talk to your doctor, nurse or pharmacist before following any medical regimen to see if it is safe and effective for you

## 2018-05-07 NOTE — ED ATTENDING ATTESTATION
Gloria Aguirre MD, saw and evaluated the patient  I have discussed the patient with the resident/non-physician practitioner and agree with the resident's/non-physician practitioner's findings, Plan of Care, and MDM as documented in the resident's/non-physician practitioner's note, except where noted  All available labs and Radiology studies were reviewed  At this point I agree with the current assessment done in the Emergency Department  I have conducted an independent evaluation of this patient including a focused history of:    Emergency Department Nehemias Rivera 67 y o  female MRN: [id number]    Unit/Bed#: ED 03 Encounter: [id number]    Elza Clement is a 67 y o  female who presents with   Chief Complaint   Patient presents with    Numbness     Pain in hands and neck  C/o numbess to bilateral arms and hands, swelling to joints         History of Present Illness   HPI:  Elza Clement is a 67 y o  female who presents for evaluation of:  Numbness and joint pains of bilateral upper extremities especially involving both hands  Patient has take ibuprofen with some relief  Review of Systems   Constitutional: Negative for fatigue and fever  Musculoskeletal: Positive for arthralgias, joint swelling and myalgias  All other systems reviewed and are negative        Historical Information   Past Medical History:   Diagnosis Date    Anxiety     Depression     Disease of thyroid gland     HYPO    Femur neck fracture (HCC)     GERD (gastroesophageal reflux disease)     Hyperthyroidism     RESOLVED: [id number]    Osteoporosis     Polio     [facility]VD [facility]per[facility] vascular disease) ([facility] [address] )     Right BBB/left ant fasc block     UTI (urinary tract infection)     Varicella infection     LAST ASSESSED: [id number]     Past Surgical History:   Procedure Laterality Date    APPENDECTOMY      HIP ARTHROPLASTY Left 11/27/2017    Procedure: REMOVAL IM NAIL CONVERSION TO TOTAL HIP ARTHROPLASTY POSTERIOR APPROACH;  Surgeon: Herberth Bishop MD;  [facility];  Service: Orthopedics    HIP FRACTURE SURGERY      HIP SURGERY      both hips replaced;2013 left ,2014 right    JOINT REPLACEMENT Right     HIP TOTAL    [place] CONV PREV HIP SURG TO TOT HIP ARTHROPLAS Left 10/4/2017    Procedure: Chyrel Petite removal of left hip;  Surgeon: Herberth Bishop MD;  [facility];  Service: Orthopedics    REVISION TOTAL HIP ARTHROPLASTY Right     TONSILLECTOMY       Social History   History   Alcohol Use No     History   Drug Use No     History   Smoking Status    Former Smoker   Smokeless Tobacco    Never Used     Comment: quit 20-30 years ago; NEVER A SMOKER AS PER ALL SCRIPTS      Family History: non-contributory    Meds/Allergies   all medications and allergies reviewed  No Known Allergies    Objective   First Vitals:   Blood Pressure: (!) 188/79 (05/07/18 0601)  Pulse: 76 (05/07/18 0601)  Temperature: 97 6 °F (36 4 °C) (05/07/18 0601)  Respirations: 16 (05/07/18 0601)  Weight - Scale: 46 7 kg (103 lb) (05/07/18 0601)  SpO2: 100 % (05/07/18 0601)    Current Vitals:   Blood Pressure: (!) 188/79 (05/07/18 0601)  Pulse: 76 (05/07/18 0601)  Temperature: 97 6 °F (36 4 °C) (05/07/18 0601)  Respirations: 16 (05/07/18 0601)  Weight - Scale: 46 7 kg (103 lb) (05/07/18 0601)  SpO2: 100 % (05/07/18 0601)    No intake or output data in the 24 hours ending 05/07/18 0610    Invasive Devices          No matching active lines, drains, or airways          Physical Exam   Constitutional: She appears well-developed and well-nourished  No distress  HENT:   Head: Normocephalic and atraumatic  Eyes: Conjunctivae are normal  Pupils are equal, round, and reactive to light  Cardiovascular: Normal rate and regular rhythm  Pulmonary/Chest: Effort normal and breath sounds normal    Abdominal: Soft  Bowel sounds are normal    Skin: Skin is warm and dry  Psychiatric: She has a normal mood and affect   Her behavior is normal  Judgment and thought content normal    Nursing note and vitals reviewed  Medical Decision Makin  Acute arthralgias of upper extremity    No results found for this or any previous visit (from the past 36 hour(s))  No orders to display         Portions of the record may have been created with voice recognition software  Occasional wrong word or "sound a like" substitutions may have occurred due to the inherent limitations of voice recognition software  Read the chart carefully and recognize, using context, where substitutions have occurred

## 2018-05-07 NOTE — ED PROVIDER NOTES
History  Chief Complaint   Patient presents with    Numbness     Pain in hands and neck  C/o numbess to bilateral arms and hands, swelling to joints      This is a 76-year-old female who presents today with numbness of bilateral hands along with joint pains and of her bowel or upper extremities  Patient states for 2 weeks she has been having numbness which she wakes up in the morning and takes ibuprofen and gets better  No history of carpal tunnel syndrome before  States with extreme flexion she notices numbness  Patient does work early she is retired  She also is experiencing the pain  And bilateral wrist, elbows, shoulders  Mild neck pain as well  States no pain in her lower extremities  Denies any fevers or chills  No weakness  Denies any chest pain shortness of breath  No rashes  Patient denies any rheumatologic disorders  States normally or joint pains resolve after ibuprofen today did not get better so she came to get evaluated  No back pain  No trauma to the knocked  Denies any numbness of her shoulder to forearm  76-year-old female presented  With numbness of bilateral hands along with joint pains  Patient has a nonspecific exam    Will do symptomatic treatment and advised patient to follow up with PCP and physical therapy  Prior to Admission Medications   Prescriptions Last Dose Informant Patient Reported? Taking?    Ascorbic Acid (VITAMIN C) 1000 MG tablet   Yes No   Sig: Take 1,000 mg by mouth daily   Multiple Vitamins-Minerals (CENTRUM SILVER PO)   Yes No   Sig: Take 1 tablet by mouth daily   Nortriptyline HCl (PAMELOR PO)   Yes No   Sig: Take 35 mg by mouth daily at bedtime   Psyllium (METAMUCIL FIBER PO)   Yes No   Sig: Take 1 packet by mouth 3 (three) times a day     Vitamin E 400 units TABS   Yes No   Sig: Take 400 Units by mouth 2 (two) times a day   acetaminophen (TYLENOL) 650 mg CR tablet   No No   Sig: Take 1 tablet by mouth every 8 (eight) hours as needed for mild pain   alendronate (FOSAMAX) 70 mg tablet   No No   Sig: TAKE 1 TABLET ONCE WEEKLY   cholecalciferol (VITAMIN D3) 1,000 units tablet   Yes No   Sig: Take 1,000 Units by mouth daily   diazepam (VALIUM) 5 mg tablet   No No   Sig: Take 1 tablet (5 mg total) by mouth every 12 (twelve) hours as needed for anxiety   docusate sodium (COLACE) 100 mg capsule   No No   Sig: Take 1 capsule by mouth 2 (two) times a day   enoxaparin (LOVENOX) 40 mg/0 4 mL   No No   Sig: Inject 0 4 mL under the skin daily for 30 days   folic acid (FOLVITE) 1 mg tablet   No No   Sig: TAKE ONE TABLET BY MOUTH EVERY DAY   gabapentin (NEURONTIN) 100 mg capsule   Yes No   Sig: Take 100 mg by mouth 3 (three) times a day   ibuprofen (MOTRIN IB) 200 mg tablet   Yes No   Sig: Take 600 mg by mouth every 8 (eight) hours as needed     levothyroxine 25 mcg tablet   No No   Sig: TAKE ONE TABLET BY MOUTH EVERY DAY   oxyCODONE (ROXICODONE) 5 mg immediate release tablet   No No   Sig: Take 1-2 tablets PO q 4 hours PRN Pain      Facility-Administered Medications: None       Past Medical History:   Diagnosis Date    Anxiety     Depression     Disease of thyroid gland     HYPO    Femur neck fracture (HCC)     GERD (gastroesophageal reflux disease)     Hyperthyroidism     RESOLVED: [id number]    Osteoporosis     Polio     PVD (peripheral vascular disease) (HCC)     Right BBB/left ant fasc block     UTI (urinary tract infection)     Varicella infection     LAST ASSESSED: [id number]       Past Surgical History:   Procedure Laterality Date    APPENDECTOMY      HIP ARTHROPLASTY Left 11/27/2017    Procedure: REMOVAL IM NAIL CONVERSION TO TOTAL HIP ARTHROPLASTY POSTERIOR APPROACH;  Surgeon: Kenneth Evans MD;  [facility];  Service: Orthopedics    HIP FRACTURE SURGERY      HIP SURGERY      both hips replaced;2013 left ,2014 right    JOINT REPLACEMENT Right     HIP TOTAL    [place] CONV PREV HIP SURG TO TOT HIP ARTHROPLAS Left 10/4/2017    Procedure: Louise removal of left hip;  Surgeon: Joey Sun MD;  [facility];  Service: Orthopedics    REVISION TOTAL HIP ARTHROPLASTY Right     TONSILLECTOMY         Family History   Problem Relation Age of Onset    Rheum arthritis Mother     Rheum arthritis Sister     Prostate cancer Brother      I have reviewed and agree with the history as documented  Social History   Substance Use Topics    Smoking status: Former Smoker    Smokeless tobacco: Never Used      Comment: quit 20-30 years ago; NEVER A SMOKER AS PER ALL SCRIPTS     Alcohol use No        Review of Systems   Constitutional: Negative  Negative for diaphoresis and fever  HENT: Negative  Respiratory: Negative  Negative for cough, shortness of breath and wheezing  Cardiovascular: Negative  Negative for chest pain, palpitations and leg swelling  Gastrointestinal: Negative for abdominal distention, abdominal pain, nausea and vomiting  Genitourinary: Negative  Musculoskeletal: Positive for arthralgias  Skin: Negative  Neurological: Positive for numbness  Psychiatric/Behavioral: Negative  All other systems reviewed and are negative  Physical Exam  ED Triage Vitals [05/07/18 0601]   Temperature Pulse Respirations Blood Pressure SpO2   97 6 °F (36 4 °C) 76 16 (!) 188/79 100 %      Temp src Heart Rate Source Patient Position - Orthostatic VS BP Location FiO2 (%)   -- -- -- -- --      Pain Score       8           Orthostatic Vital Signs  Vitals:    05/07/18 0601   BP: (!) 188/79   Pulse: 76       Physical Exam   Constitutional: She is oriented to person, place, and time  She appears well-developed and well-nourished  No distress  HENT:   Head: Normocephalic and atraumatic  Nose: Nose normal    Mouth/Throat: Oropharynx is clear and moist    Eyes: Conjunctivae and EOM are normal  Pupils are equal, round, and reactive to light  Neck: Normal range of motion  Neck supple     Cardiovascular: Normal rate, regular rhythm and normal heart sounds  No murmur heard  Pulmonary/Chest: Effort normal  No respiratory distress  She has no wheezes  Abdominal: Soft  Bowel sounds are normal  She exhibits no distension  There is no tenderness  There is no rebound and no guarding  Musculoskeletal: Normal range of motion  She exhibits no edema, tenderness or deformity  Neurological: She is alert and oriented to person, place, and time  Skin: Skin is warm and dry  Capillary refill takes less than 2 seconds  No rash noted  She is not diaphoretic  Psychiatric: She has a normal mood and affect  Vitals reviewed  ED Medications  Medications   acetaminophen (TYLENOL) tablet 650 mg (650 mg Oral Given 5/7/18 0621)   ketorolac (TORADOL) injection 15 mg (15 mg Intramuscular Given 5/7/18 0620)       Diagnostic Studies  Results Reviewed     None                 No orders to display         Procedures  Procedures      Phone Consults  ED Phone Contact    ED Course           Identification of Seniors at Risk      Most Recent Value   (ISAR) Identification of Seniors at Risk   Before the illness or injury that brought you to the Emergency, did you need someone to help you on a regular basis? 0 Filed at: 05/07/2018 0991   In the last 24 hours, have you needed more help than usual?  0 Filed at: 05/07/2018 9181   Have you been hospitalized for one or more nights during the past 6 months? 0 Filed at: 05/07/2018 5526   In general, do you see well?  0 Filed at: 05/07/2018 7766   In general, do you have serious problems with your memory?   0 Filed at: 05/07/2018 9253   Do you take more than three different medications every day?  0 Filed at: 05/07/2018 0217   ISAR Score  0 Filed at: 05/07/2018 0603                          MDM  CritCare Time    Disposition  Final diagnoses:   Bilateral hand numbness   Joint pain     Time reflects when diagnosis was documented in both MDM as applicable and the Disposition within this note     Time User Action Codes Description Comment    5/7/2018  6:29 AM Gucci BaughBrissahina Add [R20 0] Bilateral hand numbness     5/7/2018  6:29 AM Patrcia Koyanagi Add [M25 50] Joint pain       ED Disposition     ED Disposition Condition Comment    Discharge  Geetha Rivera discharge to home/self care  Condition at discharge: Good        Follow-up Information     Follow up With Specialties Details Why Florentino Burnham MD Family Medicine Schedule an appointment as soon as possible for a visit  [address]  [address]  [phone]          Patient's Medications   Discharge Prescriptions    NAPROXEN (NAPROSYN) 500 MG TABLET    Take 1 tablet (500 mg total) by mouth 2 (two) times a day with meals       Start Date: 5/7/2018  End Date: --       Order Dose: 500 mg       Quantity: 30 tablet    Refills: 0     No discharge procedures on file  ED Provider  Attending physically available and evaluated Geetha Rivera I managed the patient along with the ED Attending      Electronically Signed by         Ryne Bartholomew MD  05/08/18 9432

## 2018-05-16 ENCOUNTER — OFFICE VISIT (OUTPATIENT)
Dept: FAMILY MEDICINE CLINIC | Facility: CLINIC | Age: 73
End: 2018-05-16
Payer: MEDICARE

## 2018-05-16 VITALS
SYSTOLIC BLOOD PRESSURE: 165 MMHG | WEIGHT: 104 LBS | DIASTOLIC BLOOD PRESSURE: 90 MMHG | HEART RATE: 79 BPM | OXYGEN SATURATION: 98 % | HEIGHT: 61 IN | TEMPERATURE: 98.4 F | BODY MASS INDEX: 19.63 KG/M2

## 2018-05-16 DIAGNOSIS — Z12.11 SCREENING FOR COLON CANCER: ICD-10-CM

## 2018-05-16 DIAGNOSIS — K21.9 GASTROESOPHAGEAL REFLUX DISEASE, ESOPHAGITIS PRESENCE NOT SPECIFIED: ICD-10-CM

## 2018-05-16 DIAGNOSIS — E03.9 HYPOTHYROIDISM, UNSPECIFIED TYPE: ICD-10-CM

## 2018-05-16 DIAGNOSIS — M19.049 HAND ARTHRITIS: Primary | ICD-10-CM

## 2018-05-16 PROBLEM — M81.0 OSTEOPOROSIS: Status: ACTIVE | Noted: 2017-04-18

## 2018-05-16 PROBLEM — I45.10 RBBB: Status: ACTIVE | Noted: 2017-08-23

## 2018-05-16 PROBLEM — R74.8 ELEVATED ALKALINE PHOSPHATASE LEVEL: Status: ACTIVE | Noted: 2017-05-16

## 2018-05-16 PROBLEM — L65.9 HAIR LOSS: Status: ACTIVE | Noted: 2017-02-28

## 2018-05-16 PROBLEM — M16.12 PRIMARY LOCALIZED OSTEOARTHRITIS OF LEFT HIP: Status: ACTIVE | Noted: 2017-05-25

## 2018-05-16 PROBLEM — N39.0 URINARY TRACT INFECTION: Status: RESOLVED | Noted: 2017-08-30 | Resolved: 2018-05-16

## 2018-05-16 PROBLEM — M25.552 LEFT HIP PAIN: Status: ACTIVE | Noted: 2017-03-01

## 2018-05-16 PROCEDURE — 99214 OFFICE O/P EST MOD 30 MIN: CPT | Performed by: FAMILY MEDICINE

## 2018-05-16 RX ORDER — NORTRIPTYLINE HYDROCHLORIDE 25 MG/1
25 CAPSULE ORAL
Refills: 5 | COMMUNITY
Start: 2018-04-18 | End: 2018-05-16

## 2018-05-16 RX ORDER — NORTRIPTYLINE HYDROCHLORIDE 10 MG/1
10 CAPSULE ORAL
Refills: 5 | COMMUNITY
Start: 2018-04-18 | End: 2018-05-16

## 2018-05-16 NOTE — PROGRESS NOTES
Assessment/Plan:    No problem-specific Assessment & Plan notes found for this encounter  Diagnoses and all orders for this visit:    Hand arthritis  -     diclofenac sodium (VOLTAREN) 50 mg EC tablet; Take 1 tablet (50 mg total) by mouth 2 (two) times a day  -     TSH, 3rd generation with T4 reflex; Future  -     Lipid Panel with Direct LDL reflex; Future  -     Comprehensive metabolic panel; Future  -     HEMOGLOBIN A1C W/ EAG ESTIMATION; Future  -     CBC and differential; Future    Hypothyroidism, unspecified type  -     TSH, 3rd generation with T4 reflex; Future  -     Lipid Panel with Direct LDL reflex; Future  -     Comprehensive metabolic panel; Future  -     HEMOGLOBIN A1C W/ EAG ESTIMATION; Future  -     CBC and differential; Future    Gastroesophageal reflux disease, esophagitis presence not specified  -     TSH, 3rd generation with T4 reflex; Future  -     Lipid Panel with Direct LDL reflex; Future  -     Comprehensive metabolic panel; Future  -     HEMOGLOBIN A1C W/ EAG ESTIMATION; Future  -     CBC and differential; Future    Screening for colon cancer  -     Ambulatory referral to Gastroenterology  -     TSH, 3rd generation with T4 reflex; Future  -     Lipid Panel with Direct LDL reflex; Future  -     Comprehensive metabolic panel; Future  -     HEMOGLOBIN A1C W/ EAG ESTIMATION; Future  -     CBC and differential; Future    Will defer to Rheuma any inflammatory arthritis blood work as she has upcomign apt next month  Until then start diclofenac BID with food, discussed directions and common side effects  Call if issues or questions with new med  FU in 3 months + labs  Subjective:   Pt here for follow up visit  Labs not done  Pt complaining for about 3 weeks of swelling in hands and numbness  Complaining of arm,hand and shoulder pain     Patient ID: Jarek Devlin is a 67 y o  female  HPI   Pt presents for follow up      Pt complaining for about 3 weeks of swelling in hands and numbness  Complaining of arm,hand and shoulder pain  8/10  She went 3 weeks ago to ER for this symptoms and was prescribed naprosyn but didnt help  She made apt with Rheumatologist Dr Estrella Archer as new consult for June 21st    Her sister has RA  She denies any GI symptoms, she refuses any reflux symptoms and is currently not taking any medicines for that  Refuses colonoscopy  The following portions of the patient's history were reviewed and updated as appropriate: allergies, current medications, past family history, past medical history, past social history, past surgical history and problem list     Review of Systems   Constitutional: Negative for activity change and appetite change  Respiratory: Negative  Cardiovascular: Negative  Gastrointestinal: Negative  Genitourinary: Negative  Musculoskeletal: Positive for arthralgias  Skin: Negative for rash  Psychiatric/Behavioral: Negative  Objective:      /90   Pulse 79   Temp 98 4 °F (36 9 °C)   Ht 5' 1" (1 549 m)   Wt 47 2 kg (104 lb)   SpO2 98%   BMI 19 65 kg/m²          Physical Exam   Constitutional: She is oriented to person, place, and time  She appears well-developed and well-nourished  No distress  HENT:   Head: Normocephalic  Eyes: Conjunctivae are normal    Cardiovascular: Normal rate, regular rhythm and normal heart sounds  Pulmonary/Chest: Effort normal and breath sounds normal  No respiratory distress  She has no wheezes  She has no rales  Musculoskeletal: She exhibits tenderness (DIPS and PIPs) and deformity (hand)  Neurological: She is alert and oriented to person, place, and time  Psychiatric: She has a normal mood and affect   Her behavior is normal

## 2018-05-22 DIAGNOSIS — S72.009D CLOSED FRACTURE OF NECK OF FEMUR WITH ROUTINE HEALING, UNSPECIFIED LATERALITY, SUBSEQUENT ENCOUNTER: Primary | ICD-10-CM

## 2018-05-22 DIAGNOSIS — F41.9 ANXIETY: ICD-10-CM

## 2018-05-22 RX ORDER — GABAPENTIN 100 MG/1
100 CAPSULE ORAL 3 TIMES DAILY
Qty: 90 CAPSULE | Refills: 5 | Status: ON HOLD | OUTPATIENT
Start: 2018-05-22 | End: 2018-11-21

## 2018-05-22 RX ORDER — DIAZEPAM 5 MG/1
5 TABLET ORAL EVERY 12 HOURS PRN
Qty: 60 TABLET | Refills: 0 | OUTPATIENT
Start: 2018-05-22 | End: 2018-09-12 | Stop reason: SDUPTHER

## 2018-05-29 ENCOUNTER — HOSPITAL ENCOUNTER (OUTPATIENT)
Dept: RADIOLOGY | Facility: HOSPITAL | Age: 73
Discharge: HOME | End: 2018-05-29
Attending: ORTHOPAEDIC SURGERY
Payer: MEDICARE

## 2018-05-29 ENCOUNTER — OFFICE VISIT (OUTPATIENT)
Dept: OBGYN CLINIC | Facility: HOSPITAL | Age: 73
End: 2018-05-29
Payer: MEDICARE

## 2018-05-29 VITALS — DIASTOLIC BLOOD PRESSURE: 84 MMHG | HEART RATE: 73 BPM | SYSTOLIC BLOOD PRESSURE: 129 MMHG

## 2018-05-29 DIAGNOSIS — Z47.1 AFTERCARE FOLLOWING LEFT HIP JOINT REPLACEMENT SURGERY: Primary | ICD-10-CM

## 2018-05-29 DIAGNOSIS — Z47.1 AFTERCARE FOLLOWING LEFT HIP JOINT REPLACEMENT SURGERY: ICD-10-CM

## 2018-05-29 DIAGNOSIS — Z96.642 AFTERCARE FOLLOWING LEFT HIP JOINT REPLACEMENT SURGERY: ICD-10-CM

## 2018-05-29 DIAGNOSIS — Z96.642 AFTERCARE FOLLOWING LEFT HIP JOINT REPLACEMENT SURGERY: Primary | ICD-10-CM

## 2018-05-29 PROCEDURE — 99213 OFFICE O/P EST LOW 20 MIN: CPT | Performed by: ORTHOPAEDIC SURGERY

## 2018-05-29 PROCEDURE — 73502 X-RAY EXAM HIP UNI 2-3 VIEWS: CPT

## 2018-05-29 NOTE — PROGRESS NOTES
67 y o female 6 months s/p conversion from an IM nail to a left total hip arthroplasty  Patient is doing well  She denies any pain in the hip  She does have an intermittent numbness/burning sensation down the lateral aspect of the leg  She denies any persistent neuropathy  Patient denies any functional limitations at this time  Patient denies any other ather acute or associated complaints       Review of Systems  Review of systems negative unless otherwise specified in HPI    Past Medical History  Past Medical History:   Diagnosis Date    Anxiety     Depression     Disease of thyroid gland     HYPO    Femur neck fracture (HCC)     GERD (gastroesophageal reflux disease)     Hyperthyroidism     RESOLVED: [id number]    Osteoporosis     Polio     PVD [facility]peripheral vascular disease) ([facility] [address] [facility]     Right BBB/left ant fasc block     UTI (urinary tract infection)     Varicella infection     LAST ASSESSED: [id number]       Past Surgical History  Past Surgical History:   Procedure Laterality Date    APPENDECTOMY      HIP ARTHROPLASTY Left 11/27/2017    Procedure: REMOVAL IM NAIL CONVERSION TO TOTAL HIP ARTHROPLASTY POSTERIOR APPROACH;  Surgeon: Yoav Lira MD;  [facility];  Service: Orthopedics    [address] [place] [address]      both hips replaced;2013 left ,2014 right    JOINT REPLACEMENT Right     HIP TOTAL    [place] CONV PREV HIP SURG TO TOT HIP ARTHROPLAS Left 10/4/2017    Procedure: Hareware removal of left hip;  Surgeon: Yoav Lira MD;  [facility];  Service: Orthopedics    REVISION TOTAL HIP ARTHROPLASTY Right     TONSILLECTOMY         Current Medications  Current Outpatient Prescriptions on File Prior to Visit   Medication Sig Dispense Refill    acetaminophen (TYLENOL) 650 mg CR tablet Take 1 tablet by mouth every 8 (eight) hours as needed for mild pain 30 tablet 0    alendronate (FOSAMAX) 70 mg tablet TAKE 1 TABLET ONCE WEEKLY 4 tablet 2    Ascorbic Acid (VITAMIN C) 1000 MG tablet Take 1,000 mg by mouth daily      cholecalciferol (VITAMIN D3) 1,000 units tablet Take 1,000 Units by mouth daily      diazepam (VALIUM) 5 mg tablet Take 1 tablet (5 mg total) by mouth every 12 (twelve) hours as needed for anxiety 60 tablet 0    diclofenac sodium (VOLTAREN) 50 mg EC tablet Take 1 tablet (50 mg total) by mouth 2 (two) times a day 60 tablet 0    docusate sodium (COLACE) 100 mg capsule Take 1 capsule by mouth 2 (two) times a day 10 capsule 0    folic acid (FOLVITE) 1 mg tablet TAKE ONE TABLET BY MOUTH EVERY DAY 30 tablet 2    gabapentin (NEURONTIN) 100 mg capsule Take 1 capsule (100 mg total) by mouth 3 (three) times a day 90 capsule 5    levothyroxine 25 mcg tablet TAKE ONE TABLET BY MOUTH EVERY DAY 30 tablet 5    Multiple Vitamins-Minerals (CENTRUM SILVER PO) Take 1 tablet by mouth daily      Psyllium (METAMUCIL FIBER PO) Take 1 packet by mouth 3 (three) times a day        Vitamin E 400 units TABS Take 400 Units by mouth 2 (two) times a day       No current facility-administered medications on file prior to visit  Recent Labs [facility])    0  Lab Value Date/Time   HCT 30 5 (L) 11/29/2017 0437   HCT 36 8 12/16/2014 0727   HGB 10 0 (L) 11/29/2017 0437   HGB 11 9 12/16/2014 0727   WBC 10 85 (H) 11/29/2017 0437   WBC 5 60 12/16/2014 0727   INR 0 95 11/09/2017 1217   INR 0 96 10/13/2014 0608   GLUCOSE 97 11/30/2017 0453   GLUCOSE 89 10/30/2014 0626   HGBA1C 5 4 08/22/2017 1445       Physical exam  · General: Awake, Alert, Oriented  · Eyes: Pupils equal, round and reactive to light  · Heart: regular rate and rhythm  · Lungs: No audible wheezing  · Abdomen: soft  left Lower extremity  · nontender to palpation  · Full ROM without pain  · Strength 5/5 to hip flexion  · Sensation intact    Imaging  Joint prosthesis in good position    Procedure  none    Assessment/Plan:   67 y  o female 6 months s/p conversion to a left total hip  - continue with full activities as tolerated  - continue with abx prior to any dental work or cleaning  - follow up in 6 months for final evaluation

## 2018-05-30 DIAGNOSIS — F41.9 ANXIETY: ICD-10-CM

## 2018-05-30 DIAGNOSIS — G25.81 RLS (RESTLESS LEGS SYNDROME): ICD-10-CM

## 2018-05-30 RX ORDER — NORTRIPTYLINE HYDROCHLORIDE 25 MG/1
CAPSULE ORAL
Qty: 30 CAPSULE | Refills: 5 | Status: SHIPPED | OUTPATIENT
Start: 2018-05-30 | End: 2018-11-21 | Stop reason: HOSPADM

## 2018-05-30 RX ORDER — NORTRIPTYLINE HYDROCHLORIDE 10 MG/1
CAPSULE ORAL
Qty: 30 CAPSULE | Refills: 5 | Status: SHIPPED | OUTPATIENT
Start: 2018-05-30 | End: 2018-12-12 | Stop reason: SDUPTHER

## 2018-06-23 DIAGNOSIS — G25.81 RLS (RESTLESS LEGS SYNDROME): ICD-10-CM

## 2018-06-23 DIAGNOSIS — M81.0 OSTEOPOROSIS, UNSPECIFIED OSTEOPOROSIS TYPE, UNSPECIFIED PATHOLOGICAL FRACTURE PRESENCE: ICD-10-CM

## 2018-06-25 RX ORDER — ALENDRONATE SODIUM 70 MG/1
TABLET ORAL
Qty: 4 TABLET | Refills: 2 | Status: SHIPPED | OUTPATIENT
Start: 2018-06-25 | End: 2018-09-24 | Stop reason: SDUPTHER

## 2018-06-25 RX ORDER — FOLIC ACID 1 MG/1
TABLET ORAL
Qty: 30 TABLET | Refills: 2 | Status: SHIPPED | OUTPATIENT
Start: 2018-06-25 | End: 2018-09-30 | Stop reason: SDUPTHER

## 2018-06-27 ENCOUNTER — HOSPITAL ENCOUNTER (OUTPATIENT)
Dept: RADIOLOGY | Facility: HOSPITAL | Age: 73
Discharge: HOME | End: 2018-06-27
Attending: INTERNAL MEDICINE
Payer: MEDICARE

## 2018-06-27 ENCOUNTER — TRANSCRIBE ORDERS (OUTPATIENT)
Dept: RADIOLOGY | Facility: HOSPITAL | Age: 73
End: 2018-06-27

## 2018-06-27 DIAGNOSIS — M05.79 RHEUMATOID ARTHRITIS INVOLVING MULTIPLE SITES WITH POSITIVE RHEUMATOID FACTOR (HCC): ICD-10-CM

## 2018-06-27 DIAGNOSIS — M05.79 RHEUMATOID ARTHRITIS INVOLVING MULTIPLE SITES WITH POSITIVE RHEUMATOID FACTOR (HCC): Primary | ICD-10-CM

## 2018-06-27 PROCEDURE — 73110 X-RAY EXAM OF WRIST: CPT

## 2018-06-27 PROCEDURE — 73630 X-RAY EXAM OF FOOT: CPT

## 2018-06-27 PROCEDURE — 73130 X-RAY EXAM OF HAND: CPT

## 2018-07-16 ENCOUNTER — TRANSCRIBE ORDERS (OUTPATIENT)
Dept: ADMINISTRATIVE | Facility: HOSPITAL | Age: 73
End: 2018-07-16

## 2018-07-16 DIAGNOSIS — G56.01 CARPAL TUNNEL SYNDROME ON RIGHT: Primary | ICD-10-CM

## 2018-07-27 DIAGNOSIS — E03.9 HYPOTHYROIDISM, UNSPECIFIED TYPE: ICD-10-CM

## 2018-07-27 RX ORDER — LEVOTHYROXINE SODIUM 0.03 MG/1
TABLET ORAL
Qty: 30 TABLET | Refills: 5 | Status: SHIPPED | OUTPATIENT
Start: 2018-07-27 | End: 2018-11-21 | Stop reason: HOSPADM

## 2018-08-22 ENCOUNTER — APPOINTMENT (OUTPATIENT)
Dept: LAB | Facility: HOSPITAL | Age: 73
End: 2018-08-22
Payer: MEDICARE

## 2018-08-22 DIAGNOSIS — E03.9 HYPOTHYROIDISM, UNSPECIFIED TYPE: ICD-10-CM

## 2018-08-22 DIAGNOSIS — K21.9 GASTROESOPHAGEAL REFLUX DISEASE, ESOPHAGITIS PRESENCE NOT SPECIFIED: ICD-10-CM

## 2018-08-22 DIAGNOSIS — M19.049 HAND ARTHRITIS: ICD-10-CM

## 2018-08-22 DIAGNOSIS — Z12.11 SCREENING FOR COLON CANCER: ICD-10-CM

## 2018-08-22 LAB
ALBUMIN SERPL BCP-MCNC: 3.1 G/DL (ref 3.5–5)
ALP SERPL-CCNC: 85 U/L (ref 46–116)
ALT SERPL W P-5'-P-CCNC: 19 U/L (ref 12–78)
ANION GAP SERPL CALCULATED.3IONS-SCNC: 5 MMOL/L (ref 4–13)
AST SERPL W P-5'-P-CCNC: 15 U/L (ref 5–45)
BASOPHILS # BLD AUTO: 0.01 THOUSANDS/ΜL (ref 0–0.1)
BASOPHILS NFR BLD AUTO: 0 % (ref 0–1)
BILIRUB SERPL-MCNC: 0.39 MG/DL (ref 0.2–1)
BUN SERPL-MCNC: 13 MG/DL (ref 5–25)
CALCIUM SERPL-MCNC: 8.4 MG/DL (ref 8.3–10.1)
CHLORIDE SERPL-SCNC: 104 MMOL/L (ref 100–108)
CHOLEST SERPL-MCNC: 214 MG/DL (ref 50–200)
CO2 SERPL-SCNC: 31 MMOL/L (ref 21–32)
CREAT SERPL-MCNC: 0.64 MG/DL (ref 0.6–1.3)
EOSINOPHIL # BLD AUTO: 0.02 THOUSAND/ΜL (ref 0–0.61)
EOSINOPHIL NFR BLD AUTO: 0 % (ref 0–6)
ERYTHROCYTE [DISTWIDTH] IN BLOOD BY AUTOMATED COUNT: 16.4 % (ref 11.6–15.1)
EST. AVERAGE GLUCOSE BLD GHB EST-MCNC: 108 MG/DL
GFR SERPL CREATININE-BSD FRML MDRD: 88 ML/MIN/1.73SQ M
GLUCOSE P FAST SERPL-MCNC: 84 MG/DL (ref 65–99)
HBA1C MFR BLD: 5.4 % (ref 4.2–6.3)
HCT VFR BLD AUTO: 38.3 % (ref 34.8–46.1)
HDLC SERPL-MCNC: 84 MG/DL (ref 40–60)
HGB BLD-MCNC: 11.9 G/DL (ref 11.5–15.4)
IMM GRANULOCYTES # BLD AUTO: 0.05 THOUSAND/UL (ref 0–0.2)
IMM GRANULOCYTES NFR BLD AUTO: 1 % (ref 0–2)
LDLC SERPL CALC-MCNC: 108 MG/DL (ref 0–100)
LYMPHOCYTES # BLD AUTO: 0.79 THOUSANDS/ΜL (ref 0.6–4.47)
LYMPHOCYTES NFR BLD AUTO: 13 % (ref 14–44)
MCH RBC QN AUTO: 27.7 PG (ref 26.8–34.3)
MCHC RBC AUTO-ENTMCNC: 31.1 G/DL (ref 31.4–37.4)
MCV RBC AUTO: 89 FL (ref 82–98)
MONOCYTES # BLD AUTO: 0.39 THOUSAND/ΜL (ref 0.17–1.22)
MONOCYTES NFR BLD AUTO: 6 % (ref 4–12)
NEUTROPHILS # BLD AUTO: 5.03 THOUSANDS/ΜL (ref 1.85–7.62)
NEUTS SEG NFR BLD AUTO: 80 % (ref 43–75)
NRBC BLD AUTO-RTO: 0 /100 WBCS
PLATELET # BLD AUTO: 258 THOUSANDS/UL (ref 149–390)
PMV BLD AUTO: 9.2 FL (ref 8.9–12.7)
POTASSIUM SERPL-SCNC: 3.7 MMOL/L (ref 3.5–5.3)
PROT SERPL-MCNC: 6.4 G/DL (ref 6.4–8.2)
RBC # BLD AUTO: 4.29 MILLION/UL (ref 3.81–5.12)
SODIUM SERPL-SCNC: 140 MMOL/L (ref 136–145)
TRIGL SERPL-MCNC: 110 MG/DL
TSH SERPL DL<=0.05 MIU/L-ACNC: 2.23 UIU/ML (ref 0.36–3.74)
WBC # BLD AUTO: 6.29 THOUSAND/UL (ref 4.31–10.16)

## 2018-08-22 PROCEDURE — 80061 LIPID PANEL: CPT

## 2018-08-22 PROCEDURE — 36415 COLL VENOUS BLD VENIPUNCTURE: CPT

## 2018-08-22 PROCEDURE — 85025 COMPLETE CBC W/AUTO DIFF WBC: CPT

## 2018-08-22 PROCEDURE — 83036 HEMOGLOBIN GLYCOSYLATED A1C: CPT

## 2018-08-22 PROCEDURE — 84443 ASSAY THYROID STIM HORMONE: CPT

## 2018-08-22 PROCEDURE — 80053 COMPREHEN METABOLIC PANEL: CPT

## 2018-08-28 NOTE — PROGRESS NOTES
Assessment/Plan:    No problem-specific Assessment & Plan notes found for this encounter  {Assess/PlanSmartLinks:57824}      Subjective:   Pt here for 3 month follow up visit  Labs done 8/22/18  Pt sees mickey    Patient ID: Alnea Bruno is a 75 y o  female  HPI    {Common ambulatory SmartLinks:20126}    Review of Systems      Objective: There were no vitals taken for this visit           Physical Exam

## 2018-08-29 ENCOUNTER — OFFICE VISIT (OUTPATIENT)
Dept: FAMILY MEDICINE CLINIC | Facility: CLINIC | Age: 73
End: 2018-08-29
Payer: MEDICARE

## 2018-08-29 VITALS
OXYGEN SATURATION: 98 % | TEMPERATURE: 99 F | WEIGHT: 104.4 LBS | HEART RATE: 86 BPM | HEIGHT: 60 IN | DIASTOLIC BLOOD PRESSURE: 70 MMHG | BODY MASS INDEX: 20.5 KG/M2 | SYSTOLIC BLOOD PRESSURE: 128 MMHG

## 2018-08-29 DIAGNOSIS — Z00.00 MEDICARE ANNUAL WELLNESS VISIT, SUBSEQUENT: Primary | ICD-10-CM

## 2018-08-29 DIAGNOSIS — M81.0 OSTEOPOROSIS, UNSPECIFIED OSTEOPOROSIS TYPE, UNSPECIFIED PATHOLOGICAL FRACTURE PRESENCE: ICD-10-CM

## 2018-08-29 DIAGNOSIS — E03.9 HYPOTHYROIDISM, UNSPECIFIED TYPE: ICD-10-CM

## 2018-08-29 PROCEDURE — 99213 OFFICE O/P EST LOW 20 MIN: CPT | Performed by: FAMILY MEDICINE

## 2018-08-29 PROCEDURE — G0439 PPPS, SUBSEQ VISIT: HCPCS | Performed by: FAMILY MEDICINE

## 2018-08-29 RX ORDER — PREDNISONE 10 MG/1
5 TABLET ORAL DAILY
COMMUNITY
End: 2019-07-24 | Stop reason: ALTCHOICE

## 2018-08-29 NOTE — PROGRESS NOTES
Assessment and Plan:  Problem List Items Addressed This Visit     None      Visit Diagnoses     Medicare annual wellness visit, subsequent    -  Primary        Health Maintenance Due   Topic Date Due    Fall Risk  06/24/2009    Urinary Incontinence Screening  06/24/2009    Pneumococcal PPSV23/PCV13 65+ Years / Low and Medium Risk (2 of 2 - PPSV23) 09/23/2018         HPI:  Daniella Gardner is a 75 y o  female here for her Subsequent Wellness Visit      Patient Active Problem List   Diagnosis    Dizziness    Anxiety    Peripheral vascular disease ([facility] [address] )    Epigastric discomfort    RLS (restless legs syndrome)    Arthritis of left hip    S/P total hip arthroplasty    Closed fracture of femur, neck (HCC)    Elevated alkaline phosphatase level    Hair loss    Hypothyroidism    Left hip pain    Osteoporosis    Primary localized osteoarthritis of left hip    RBBB    Hand arthritis    Screening for colon cancer     Past Medical History:   Diagnosis Date    Anxiety     Depression     Disease of thyroid gland     HYPO    Femur neck fracture (HCC)     GERD (gastroesophageal reflux disease)     Hyperthyroidism     RESOLVED: [id number]    Osteoporosis     Polio     PVD (peripheral vascular disease) ([facility] [address] )     Right BBB/left ant fasc block     UTI (urinary tract infection)     Varicella infection     LAST ASSESSED: [id number]     Past Surgical History:   Procedure Laterality Date    APPENDECTOMY      HIP ARTHROPLASTY Left 11/27/2017    Procedure: REMOVAL IM NAIL CONVERSION TO TOTAL HIP ARTHROPLASTY POSTERIOR APPROACH;  Surgeon: Kenneth Evans MD;  [facility];  Service: Orthopedics    HIP FRACTURE SURGERY      HIP SURGERY      both hips replaced;2013 left ,2014 right    JOINT REPLACEMENT Right     HIP TOTAL    [place] CONV PREV HIP SURG TO TOT HIP Sharron Abu Left 10/4/2017    Procedure: Dulcy Staple removal of left hip;  Surgeon: Kenneth Evans MD;  [facility];  Service: Orthopedics   [facility] REVISION TOTAL HIP ARTHROPLASTY Right     TONSILLECTOMY       Family History   Problem Relation Age of Onset    Rheum arthritis Mother     Rheum arthritis Sister     Prostate cancer Brother      History   Smoking Status    Former Smoker   Smokeless Tobacco    Never Used     Comment: quit 20-30 years ago; NEVER A SMOKER AS PER ALL SCRIPTS      History   Alcohol Use No      History   Drug Use No         Current Outpatient Prescriptions   Medication Sig Dispense Refill    alendronate (FOSAMAX) 70 mg tablet TAKE 1 TABLET ONCE WEEKLY 4 tablet 2    Ascorbic Acid (VITAMIN C) 1000 MG tablet Take 1,000 mg by mouth daily      cholecalciferol (VITAMIN D3) 1,000 units tablet Take 1,000 Units by mouth daily      diazepam (VALIUM) 5 mg tablet Take 1 tablet (5 mg total) by mouth every 12 (twelve) hours as needed for anxiety 60 tablet 0    docusate sodium (COLACE) 100 mg capsule Take 1 capsule by mouth 2 (two) times a day 10 capsule 0    folic acid (FOLVITE) 1 mg tablet TAKE ONE TABLET BY MOUTH EVERY DAY 30 tablet 2    gabapentin (NEURONTIN) 100 mg capsule Take 1 capsule (100 mg total) by mouth 3 (three) times a day 90 capsule 5    levothyroxine 25 mcg tablet TAKE ONE TABLET BY MOUTH EVERY DAY 30 tablet 5    Multiple Vitamins-Minerals (CENTRUM SILVER PO) Take 1 tablet by mouth daily      nortriptyline (PAMELOR) 10 mg capsule TAKE ONE CAPSULE BY MOUTH AT BEDTIME 30 capsule 5    nortriptyline (PAMELOR) 25 mg capsule TAKE ONE CAPSULE BY MOUTH AT BEDTIME 30 capsule 5    predniSONE 10 mg tablet Take 10 mg by mouth daily      Psyllium (METAMUCIL FIBER PO) Take 1 packet by mouth 3 (three) times a day        Vitamin E 400 units TABS Take 400 Units by mouth 2 (two) times a day      acetaminophen (TYLENOL) 650 mg CR tablet Take 1 tablet by mouth every 8 (eight) hours as needed for mild pain (Patient not taking: Reported on 8/29/2018 ) 30 tablet 0    diclofenac sodium (VOLTAREN) 50 mg EC tablet Take 1 tablet (50 mg total) by mouth 2 (two) times a day (Patient not taking: Reported on 2018 ) 60 tablet 0     No current facility-administered medications for this visit  No Known Allergies  Immunization History   Administered Date(s) Administered    Influenza 10/11/2015    Influenza TIV (IM) 2017    Pneumococcal Conjugate 13-Valent 2017, 2017    Tdap 2016       Patient Care Team:  Prisca Corrales MD as PCP - General Medicare Screening Tests and Risk Assessments:  Sudha Humphries is here for her Subsequent Wellness visit  Last Medicare Wellness visit information reviewed, patient interviewed, no change since last AWV  Last Medicare Wellness visit information reviewed, patient interviewed and updates made to the record today  Health Risk Assessment:  Patient rates overall health as good  Patient feels that their physical health rating is Same  Eyesight was rated as Same  Hearing was rated as Same  Patient feels that their emotional and mental health rating is Same  Pain experienced by patient in the last 7 days has been Some  Patient's pain rating has been 7/10  Patient states that she has experienced no weight loss or gain in last 6 months  Emotional/Mental Health:  Patient has been feeling nervous/anxious  PHQ-9 Depression Screening:    Frequency of the following problems over the past two weeks:      1  Little interest or pleasure in doing things: 0 - not at all      2  Feeling down, depressed, or hopeless: 1 - several days      3  Trouble falling or staying asleep, or sleeping too much: 2 - more than half the days      4  Feeling tired or having little energy: 0 - not at all      5  Poor appetite or overeatin - not at all      6  Feeling bad about yourself - or that you are a failure or have let yourself or your family down: 0 - not at all      7  Trouble concentrating on things, such as reading the newspaper or watching television: 0 - not at all      8   Moving or speaking so slowly that other people could have noticed  Or the opposite - being so fidgety or restless that you have been moving around a lot more than usual: 0 - not at all      9  Thoughts that you would be better off dead, or of hurting yourself in some way: 0 - not at all  PHQ-2 Score: 1  PHQ-9 Score: 1    Broken Bones/Falls: Fall Risk Assessment:    In the past year, patient has experienced: No history of falling in past year          Bladder/Bowel:  Patient has not leaked urine accidently in the last six months  Patient reports no loss of bowel control  Immunizations:  Patient has had a flu vaccination within the last year  Patient has not received a pneumonia shot  Patient has received a shingles shot  Patient has not received tetanus/diphtheria shot  Home Safety:  Patient does not have trouble with stairs inside or outside of their home  Patient currently reports that there are no safety hazards present in home, working smoke alarms, working carbon monoxide detectors  Nutrition:  Current diet: Regular with servings of the following:    Medications:  Patient is currently taking over-the-counter supplements  Patient is able to manage medications  Lifestyle Choices:  Patient reports no tobacco use  Patient has not smoked or used tobacco in the past   Patient reports no alcohol use  Patient drives a vehicle  Patient wears seat belt  Activities of Daily Living:  Can get out of bed by his or her self, able to dress self, able to make own meals, able to do own shopping, able to bathe self, can do own laundry/housekeeping, can manage own money, pay bills and track expenses    Previous Hospitalizations:  No hospitalization or ED visit in past 12 months        Advanced Directives:  Patient has not decided on power of   Patient has not completed advanced directive          Preventative Screening/Counseling:      Cardiovascular:      General: Risks and Benefits Discussed and Screening Current          Diabetes:      General: Screening Current and Risks and Benefits Discussed      Counseling: Healthy Diet and Healthy Weight          Colorectal Cancer:      General: Risks and Benefits Discussed and Screening Current          Breast Cancer:      General: Risks and Benefits Discussed          Cervical Cancer:      General: Risks and Benefits Discussed and Screening Not Indicated          Osteoporosis:      General: Risks and Benefits Discussed and Screening Current          AAA:      General: Screening Not Indicated          Glaucoma:      General: Risks and Benefits Discussed          HIV:      General: Screening Not Indicated          Hepatitis C:      General: Screening Not Indicated        Advanced Directives:   5 wishes given       Immunizations:      Influenza: Risks & Benefits Discussed and Influenza Recommended Annually          No exam data present  Physical Exam :  General ROS: negative  Psychological ROS: negative  Ophthalmic ROS: negative  ENT ROS: negative  Respiratory ROS: no cough, shortness of breath, or wheezing  Cardiovascular ROS: no chest pain or dyspnea on exertion  Gastrointestinal ROS: no abdominal pain, change in bowel habits, or black or bloody stools  Dermatological ROS: negative  Physical Exam

## 2018-08-29 NOTE — PROGRESS NOTES
Assessment/Plan:    No problem-specific Assessment & Plan notes found for this encounter  Diagnoses and all orders for this visit:    Medicare annual wellness visit, subsequent    Hypothyroidism, unspecified type    Osteoporosis, unspecified osteoporosis type, unspecified pathological fracture presence    Other orders  -     predniSONE 10 mg tablet; Take 10 mg by mouth daily        Refills of all meds send to [facility] chronic cond 6-7 months    Subjective:      Patient ID: Jailene Ruby is a 75 y o  female  HPI   Patient presents for chronic condition follow-up with labs and AWV  Labs reviewed and discussed with patient and all stable  She gets her flu shot from [facility], plans to get in sept  No acute issues or concerns  The following portions of the patient's history were reviewed and updated as appropriate: allergies, current medications, past family history, past medical history, past social history, past surgical history and problem list     Review of Systems   Constitutional: Negative for activity change and appetite change  Respiratory: Negative  Cardiovascular: Negative  Gastrointestinal: Negative  Genitourinary: Negative  Musculoskeletal: Negative for arthralgias  Skin: Negative for rash  Psychiatric/Behavioral: Negative  Objective:      /70   Pulse 86   Temp 99 °F (37 2 °C)   Ht 5' 0 43" (1 535 m)   Wt 47 4 kg (104 lb 6 4 oz)   SpO2 98%   BMI 20 10 kg/m²          Physical Exam   Constitutional: She is oriented to person, place, and time  She appears well-developed and well-nourished  HENT:   Head: Normocephalic  Eyes: Conjunctivae are normal    Cardiovascular: Normal rate, regular rhythm and normal heart sounds  Pulmonary/Chest: Effort normal and breath sounds normal  No respiratory distress  She has no wheezes  She has no rales  Musculoskeletal: She exhibits no edema  Neurological: She is alert and oriented to person, place, and time  Psychiatric: She has a normal mood and affect   Her behavior is normal            Recent Results (from the past 672 hour(s))   TSH, 3rd generation with T4 reflex    Collection Time: 08/22/18  7:13 AM   Result Value Ref Range    TSH 3RD GENERATON 2 230 0 358 - 3 740 uIU/mL   Lipid Panel with Direct LDL reflex    Collection Time: 08/22/18  7:13 AM   Result Value Ref Range    Cholesterol 214 (H) 50 - 200 mg/dL    Triglycerides 110 <=150 mg/dL    HDL, Direct 84 (H) 40 - 60 mg/dL    LDL Calculated 108 (H) 0 - 100 mg/dL   Comprehensive metabolic panel    Collection Time: 08/22/18  7:13 AM   Result Value Ref Range    Sodium 140 136 - 145 mmol/L    Potassium 3 7 3 5 - 5 3 mmol/L    Chloride 104 100 - 108 mmol/L    CO2 31 21 - 32 mmol/L    ANION GAP 5 4 - 13 mmol/L    BUN 13 5 - 25 mg/dL    Creatinine 0 64 0 60 - 1 30 mg/dL    Glucose, Fasting 84 65 - 99 mg/dL    Calcium 8 4 8 3 - 10 1 mg/dL    AST 15 5 - 45 U/L    ALT 19 12 - 78 U/L    Alkaline Phosphatase 85 46 - 116 U/L    Total Protein 6 4 6 4 - 8 2 g/dL    Albumin 3 1 (L) 3 5 - 5 0 g/dL    Total Bilirubin 0 39 0 20 - 1 00 mg/dL    eGFR 88 ml/min/1 73sq m   HEMOGLOBIN A1C W/ EAG ESTIMATION    Collection Time: 08/22/18  7:13 AM   Result Value Ref Range    Hemoglobin A1C 5 4 4 2 - 6 3 %     mg/dl   CBC and differential    Collection Time: 08/22/18  7:13 AM   Result Value Ref Range    WBC 6 29 4 31 - 10 16 Thousand/uL    RBC 4 29 3 81 - 5 12 Million/uL    Hemoglobin 11 9 11 5 - 15 4 g/dL    Hematocrit 38 3 34 8 - 46 1 %    MCV 89 82 - 98 fL    MCH 27 7 26 8 - 34 3 pg    MCHC 31 1 (L) 31 4 - 37 4 g/dL    RDW 16 4 (H) 11 6 - 15 1 %    MPV 9 2 8 9 - 12 7 fL    Platelets 051 007 - 351 Thousands/uL    nRBC 0 /100 WBCs    Neutrophils Relative 80 (H) 43 - 75 %    Immat GRANS % 1 0 - 2 %    Lymphocytes Relative 13 (L) 14 - 44 %    Monocytes Relative 6 4 - 12 %    Eosinophils Relative 0 0 - 6 %    Basophils Relative 0 0 - 1 %    Neutrophils Absolute 5 03 1 85 - 7 62 Thousands/µL    Immature Grans Absolute 0 05 0 00 - 0 20 Thousand/uL    Lymphocytes Absolute 0 79 0 60 - 4 47 Thousands/µL    Monocytes Absolute 0 39 0 17 - 1 22 Thousand/µL    Eosinophils Absolute 0 02 0 00 - 0 61 Thousand/µL    Basophils Absolute 0 01 0 00 - 0 10 Thousands/µL

## 2018-09-12 DIAGNOSIS — F41.9 ANXIETY: ICD-10-CM

## 2018-09-12 RX ORDER — DIAZEPAM 5 MG/1
TABLET ORAL
Qty: 60 TABLET | Refills: 0 | Status: SHIPPED | OUTPATIENT
Start: 2018-09-12 | End: 2019-02-05 | Stop reason: SDUPTHER

## 2018-09-24 DIAGNOSIS — M81.0 OSTEOPOROSIS, UNSPECIFIED OSTEOPOROSIS TYPE, UNSPECIFIED PATHOLOGICAL FRACTURE PRESENCE: ICD-10-CM

## 2018-09-24 RX ORDER — ALENDRONATE SODIUM 70 MG/1
TABLET ORAL
Qty: 4 TABLET | Refills: 2 | Status: SHIPPED | OUTPATIENT
Start: 2018-09-24 | End: 2018-12-20 | Stop reason: SDUPTHER

## 2018-09-30 DIAGNOSIS — G25.81 RLS (RESTLESS LEGS SYNDROME): ICD-10-CM

## 2018-10-01 RX ORDER — FOLIC ACID 1 MG/1
TABLET ORAL
Qty: 30 TABLET | Refills: 2 | Status: SHIPPED | OUTPATIENT
Start: 2018-10-01 | End: 2021-06-03 | Stop reason: HOSPADM

## 2018-10-09 ENCOUNTER — HOSPITAL ENCOUNTER (OUTPATIENT)
Dept: NEUROLOGY | Facility: AMBULATORY SURGERY CENTER | Age: 73
Discharge: HOME | End: 2018-10-09
Payer: MEDICARE

## 2018-10-09 DIAGNOSIS — G56.01 CARPAL TUNNEL SYNDROME ON RIGHT: ICD-10-CM

## 2018-10-09 PROCEDURE — 95886 MUSC TEST DONE W/N TEST COMP: CPT | Performed by: PSYCHIATRY & NEUROLOGY

## 2018-10-09 PROCEDURE — 95909 NRV CNDJ TST 5-6 STUDIES: CPT | Performed by: PSYCHIATRY & NEUROLOGY

## 2018-11-02 ENCOUNTER — HOSPITAL ENCOUNTER (EMERGENCY)
Facility: HOSPITAL | Age: 73
Discharge: HOME | End: 2018-11-02
Attending: EMERGENCY MEDICINE
Payer: MEDICARE

## 2018-11-02 VITALS
RESPIRATION RATE: 18 BRPM | HEART RATE: 80 BPM | WEIGHT: 104.5 LBS | SYSTOLIC BLOOD PRESSURE: 139 MMHG | HEIGHT: 60 IN | OXYGEN SATURATION: 99 % | DIASTOLIC BLOOD PRESSURE: 72 MMHG | BODY MASS INDEX: 20.52 KG/M2 | TEMPERATURE: 97.9 F

## 2018-11-02 DIAGNOSIS — M54.9 BACK PAIN: Primary | ICD-10-CM

## 2018-11-02 PROCEDURE — 99283 EMERGENCY DEPT VISIT LOW MDM: CPT

## 2018-11-02 PROCEDURE — 96372 THER/PROPH/DIAG INJ SC/IM: CPT

## 2018-11-02 RX ORDER — KETOROLAC TROMETHAMINE 30 MG/ML
15 INJECTION, SOLUTION INTRAMUSCULAR; INTRAVENOUS ONCE
Status: COMPLETED | OUTPATIENT
Start: 2018-11-02 | End: 2018-11-02

## 2018-11-02 RX ORDER — IBUPROFEN 400 MG/1
400 TABLET ORAL EVERY 6 HOURS PRN
Qty: 30 TABLET | Refills: 0 | Status: SHIPPED | OUTPATIENT
Start: 2018-11-02 | End: 2018-11-21 | Stop reason: HOSPADM

## 2018-11-02 RX ORDER — ACETAMINOPHEN 325 MG/1
975 TABLET ORAL ONCE
Status: DISCONTINUED | OUTPATIENT
Start: 2018-11-02 | End: 2018-11-02 | Stop reason: HOSPADM

## 2018-11-02 RX ORDER — LIDOCAINE 50 MG/G
1 PATCH TOPICAL ONCE
Status: DISCONTINUED | OUTPATIENT
Start: 2018-11-02 | End: 2018-11-02 | Stop reason: HOSPADM

## 2018-11-02 RX ADMIN — KETOROLAC TROMETHAMINE 15 MG: 30 INJECTION, SOLUTION INTRAMUSCULAR at 04:15

## 2018-11-02 RX ADMIN — LIDOCAINE 1 PATCH: 50 PATCH CUTANEOUS at 04:15

## 2018-11-02 NOTE — DISCHARGE INSTRUCTIONS
Follow up with your pcp in the next 3-5 days  If your symptoms worsen, return to the ED immediately  Back Pain   WHAT YOU NEED TO KNOW:   Back pain is common  It can be caused by many conditions, such as arthritis or the breakdown of spinal discs  Your risk for back pain is increased by injuries, lack of activity, or repeated bending and twisting  You may feel sore or stiff on one or both sides of your back  The pain may spread to your buttocks or thighs  DISCHARGE INSTRUCTIONS:   Medicines:   · NSAIDs  help decrease swelling and pain  This medicine is available with or without a doctor's order  NSAIDs can cause stomach bleeding or kidney problems in certain people  If you take blood thinner medicine, always ask your healthcare provider if NSAIDs are safe for you  Always read the medicine label and follow directions  · Acetaminophen  decreases pain  It is available without a doctor's order  Ask how much to take and how often to take it  Follow directions  Acetaminophen can cause liver damage if not taken correctly  · Prescription pain medicine  may be given  Ask your healthcare provider how to take this medicine safely  · Take your medicine as directed  Contact your healthcare provider if you think your medicine is not helping or if you have side effects  Tell him or her if you are allergic to any medicine  Keep a list of the medicines, vitamins, and herbs you take  Include the amounts, and when and why you take them  Bring the list or the pill bottles to follow-up visits  Carry your medicine list with you in case of an emergency  Follow up with your healthcare provider in 2 weeks, or as directed:  Write down your questions so you remember to ask them during your visits  How to manage your back pain:   · Apply ice  on your back or affected area for 15 to 20 minutes every hour or as directed  Use an ice pack, or put crushed ice in a plastic bag  Cover it with a towel   Ice helps prevent tissue damage and decreases pain  · Apply heat  on your back or affected area for 20 to 30 minutes every 2 hours for as many days as directed  Heat helps decrease pain and muscle spasms  · Stay active  as much as you can without causing more pain  Bed rest could make your back pain worse  Avoid heavy lifting until your pain is gone  Return to the emergency department if:   · You have pain, numbness, or weakness in one or both legs  · Your pain becomes so severe that you cannot walk  · You cannot control your urine or bowel movements  · You have severe back pain with chest pain  · You have severe back pain, nausea, and vomiting  · You have severe back pain that spreads to your side or genital area  Contact your healthcare provider if:   · You have back pain that does not get better with rest and pain medicine  · You have a fever  · You have pain that worsens when you are on your back or when you rest     · You have pain that worsens when you cough or sneeze  · You lose weight without trying  · You have questions or concerns about your condition or care  © 2017 [facility] Information is for End User's use only and may not be sold, redistributed or otherwise used for commercial purposes  All illustrations and images included in CareNotes® are the copyrighted property of A D A M , Inc  or [employer]an [employer]  The above information is an  only  It is not intended as medical advice for individual conditions or treatments  Talk to your doctor, nurse or pharmacist before following any medical regimen to see if it is safe and effective for you

## 2018-11-02 NOTE — ED PROVIDER NOTES
History  Chief Complaint   Patient presents with    Back Pain     Patient states she lifted a mattress two days ago and pulled the muscle in her back      58-year-old female history of osteoporosis on prednisone presents to the emergency department for evaluation of back pain  Patient states that she started to have back pain 2 days ago when she was trying to  her mattress  She states that her pain is sharp made worse if she tries to walk  She took since 6 oxycodone tablets in the past two days  which  does make her pain better  She is here today because she could not get up from her bed to go to the bathroom so she called the ambulance  Denies any urinary/ bowel incontinence denies any IVDA drug abuse denies falling on her back  Denies any numbness or tingling of her extremities does continue to endorse perianal sensation  Patient has not not had any recent illness, fevers, chest pain, shortness of breath, nausea, vomiting, abdominal pain or any other systematic complaints  Prior to Admission Medications   Prescriptions Last Dose Informant Patient Reported? Taking? Ascorbic Acid (VITAMIN C) 1000 MG tablet  Self Yes Yes   Sig: Take 1,000 mg by mouth daily   Multiple Vitamins-Minerals (CENTRUM SILVER PO)  Self Yes Yes   Sig: Take 1 tablet by mouth daily   Psyllium (METAMUCIL FIBER PO)  Self Yes Yes   Sig: Take 1 packet by mouth 3 (three) times a day     Vitamin E 400 units TABS  Self Yes Yes   Sig: Take 400 Units by mouth 2 (two) times a day   alendronate (FOSAMAX) 70 mg tablet   No Yes   Sig: TAKE 1 TABLET ONCE WEEKLY   cholecalciferol (VITAMIN D3) 1,000 units tablet  Self Yes Yes   Sig: Take 1,000 Units by mouth daily   diazepam (VALIUM) 5 mg tablet   No Yes   Sig: TAKE ONE TABLET BY MOUTH EVERY 12 HOURS AS NEEDED FOR ANXIETY     docusate sodium (COLACE) 100 mg capsule  Self No Yes   Sig: Take 1 capsule by mouth 2 (two) times a day   folic acid (FOLVITE) 1 mg tablet   No Yes   Sig: TAKE ONE TABLET BY MOUTH EVERY DAY   gabapentin (NEURONTIN) 100 mg capsule  Self No Yes   Sig: Take 1 capsule (100 mg total) by mouth 3 (three) times a day   levothyroxine 25 mcg tablet  Self No Yes   Sig: TAKE ONE TABLET BY MOUTH EVERY DAY   nortriptyline (PAMELOR) 10 mg capsule  Self No Yes   Sig: TAKE ONE CAPSULE BY MOUTH AT BEDTIME   nortriptyline (PAMELOR) 25 mg capsule  Self No Yes   Sig: TAKE ONE CAPSULE BY MOUTH AT BEDTIME   predniSONE 10 mg tablet   Yes Yes   Sig: Take 10 mg by mouth daily      Facility-Administered Medications: None       Past Medical History:   Diagnosis Date    Anxiety     Depression     Disease of thyroid gland     HYPO    Femur neck fracture (HCC)     GERD (gastroesophageal reflux disease)     Hyperthyroidism     RESOLVED: [id number]    Osteoporosis     Polio     PVD (peripheral vascular disease) (HCC)     Right BBB/left ant fasc block     UTI (urinary tract infection)     Varicella infection     LAST ASSESSED: [id number]       Past Surgical History:   Procedure Laterality Date    APPENDECTOMY      HIP ARTHROPLASTY Left 11/27/2017    Procedure: REMOVAL IM NAIL CONVERSION TO TOTAL HIP ARTHROPLASTY POSTERIOR APPROACH;  Surgeon: Edwin Padron MD;  [facility];  Service: Orthopedics    HIP FRACTURE SURGERY      HIP SURGERY      both hips replaced;2013 left ,2014 right    JOINT REPLACEMENT Right     HIP TOTAL    [place] CONV PREV HIP SURG TO TOT HIP ARTHROPLAS Left 10/4/2017    Procedure: Allison Larwill removal of left hip;  Surgeon: Edwin Padron MD;  [facility];  Service: Orthopedics    REVISION TOTAL HIP ARTHROPLASTY Right     TONSILLECTOMY         Family History   Problem Relation Age of Onset    Rheum arthritis Mother     Rheum arthritis Sister     Prostate cancer Brother      I have reviewed and agree with the history as documented      Social History   Substance Use Topics    Smoking status: Former Smoker    Smokeless tobacco: Never Used      Comment: quit 20-30 years ago; NEVER A SMOKER AS PER ALL SCRIPTS     Alcohol use No        Review of Systems   Constitutional: Negative for appetite change, chills, diaphoresis, fatigue and fever  HENT: Negative for congestion, ear discharge, ear pain, hearing loss, postnasal drip, rhinorrhea, sneezing and sore throat  Eyes: Negative for pain, discharge and redness  Respiratory: Negative for choking, chest tightness, shortness of breath, wheezing and stridor  Cardiovascular: Negative for chest pain and palpitations  Gastrointestinal: Negative for abdominal distention, abdominal pain, blood in stool, constipation, diarrhea, nausea and vomiting  Genitourinary: Negative for decreased urine volume, difficulty urinating, dysuria, flank pain, frequency and hematuria  Musculoskeletal: Positive for back pain  Negative for arthralgias, gait problem, joint swelling and neck pain  Skin: Negative for color change, pallor and rash  Allergic/Immunologic: Negative for environmental allergies, food allergies and immunocompromised state  Neurological: Negative for dizziness, seizures, weakness, light-headedness, numbness and headaches  Hematological: Negative for adenopathy  Does not bruise/bleed easily  Psychiatric/Behavioral: Negative for agitation and behavioral problems  Physical Exam  ED Triage Vitals [11/02/18 0353]   Temperature Pulse Respirations Blood Pressure SpO2   97 9 °F (36 6 °C) 80 18 139/72 99 %      Temp Source Heart Rate Source Patient Position - Orthostatic VS BP Location FiO2 (%)   Oral Monitor Lying Right arm --      Pain Score       Worst Possible Pain           Orthostatic Vital Signs  Vitals:    11/02/18 0353   BP: 139/72   Pulse: 80   Patient Position - Orthostatic VS: Lying       Physical Exam   Constitutional: She is oriented to person, place, and time  She appears well-developed and well-nourished  HENT:   Head: Normocephalic and atraumatic     Nose: Nose normal    Mouth/Throat: Oropharynx is clear and moist    Eyes: Pupils are equal, round, and reactive to light  Conjunctivae and EOM are normal    Neck: Normal range of motion  Neck supple  Cardiovascular: Normal rate, regular rhythm and normal heart sounds  Exam reveals no gallop and no friction rub  No murmur heard  Pulmonary/Chest: Effort normal and breath sounds normal    Abdominal: Soft  Bowel sounds are normal  She exhibits no distension  There is no tenderness  There is no rebound and no guarding  Musculoskeletal: Normal range of motion  She exhibits tenderness  No midline spinal tenderness   Neurological: She is alert and oriented to person, place, and time  She has normal strength and normal reflexes  No sensory deficit  She displays a negative Romberg sign  Skin: Skin is warm and dry  No erythema  No pallor  Psychiatric: She has a normal mood and affect  Her behavior is normal    Nursing note and vitals reviewed  ED Medications  Medications   ketorolac (TORADOL) injection 15 mg (15 mg Intramuscular Given 11/2/18 5260)       Diagnostic Studies  Results Reviewed     None                 No orders to display         Procedures  Procedures      Phone Consults  ED Phone Contact    ED Course           Identification of Seniors at Risk      Most Recent Value   (ISAR) Identification of Seniors at Risk   Before the illness or injury that brought you to the Emergency, did you need someone to help you on a regular basis? 0 Filed at: 11/02/2018 0358   In the last 24 hours, have you needed more help than usual?  0 Filed at: 11/02/2018 0487   Have you been hospitalized for one or more nights during the past 6 months? 0 Filed at: 11/02/2018 0358   In general, do you see well?  0 Filed at: 11/02/2018 0358   In general, do you have serious problems with your memory?   0 Filed at: 11/02/2018 0358   Do you take more than three different medications every day?  0 Filed at: 11/02/2018 0358   ISAR Score  0 Filed at: 11/02/2018 1981 MDM  Number of Diagnoses or Management Options  Back pain:   Diagnosis management comments: 75year old female presents to the ED for evaluation of back pain     MDM: I will order toradol, tylenol, lidoderm patch for what is likely msk back pain  I reviewed all testing with the patient:  I gave oral return precautions for what to return for in addition to the written return precautions  The patient verbalized understanding of the discharge instructions and warnings that would necessitate return to the Emergency Department  I specifically highlighted areas of special concern regarding the written and verbal discharge instructions and return precautions  All questions were answered prior to discharge  CritCare Time    Disposition  Final diagnoses:   Back pain     Time reflects when diagnosis was documented in both MDM as applicable and the Disposition within this note     Time User Action Codes Description Comment    11/2/2018  4:56 AM Fred Orozco [M54 9] Back pain       ED Disposition     ED Disposition Condition Comment    Discharge  Geetha Rivera discharge to home/self care  Condition at discharge: Stable        Follow-up Information     Follow up With Specialties Details Why Maryetta Apley, MD Family Medicine In 3 days  [address] [facility]  [phone]            Discharge Medication List as of 11/2/2018  4:57 AM      START taking these medications    Details   ibuprofen (MOTRIN) 400 mg tablet Take 1 tablet (400 mg total) by mouth every 6 (six) hours as needed for mild pain, Starting Fri 11/2/2018, Print         CONTINUE these medications which have NOT CHANGED    Details   alendronate (FOSAMAX) 70 mg tablet TAKE 1 TABLET ONCE WEEKLY, Normal      Ascorbic Acid (VITAMIN C) 1000 MG tablet Take 1,000 mg by mouth daily, Historical Med      cholecalciferol (VITAMIN D3) 1,000 units tablet Take 1,000 Units by mouth daily, Starting Tue 12/13/2016, Historical Med      diazepam (VALIUM) 5 mg tablet TAKE ONE TABLET BY MOUTH EVERY 12 HOURS AS NEEDED FOR ANXIETY , Normal      docusate sodium (COLACE) 100 mg capsule Take 1 capsule by mouth 2 (two) times a day, Starting Mon 54/4054/40/2765, Normal      folic acid (FOLVITE) 1 mg tablet TAKE ONE TABLET BY MOUTH EVERY DAY, Normal      gabapentin (NEURONTIN) 100 mg capsule Take 1 capsule (100 mg total) by mouth 3 (three) times a day, Starting Tue 5/22/2018, Normal      levothyroxine 25 mcg tablet TAKE ONE TABLET BY MOUTH EVERY DAY, Normal      Multiple Vitamins-Minerals (CENTRUM SILVER PO) Take 1 tablet by mouth daily, Starting Tue 12/13/2016, Historical Med      !! nortriptyline (PAMELOR) 10 mg capsule TAKE ONE CAPSULE BY MOUTH AT BEDTIME, Normal      !! nortriptyline (PAMELOR) 25 mg capsule TAKE ONE CAPSULE BY MOUTH AT BEDTIME, Normal      predniSONE 10 mg tablet Take 10 mg by mouth daily, Historical Med      Psyllium (METAMUCIL FIBER PO) Take 1 packet by mouth 3 (three) times a day  , Historical Med      Vitamin E 400 units TABS Take 400 Units by mouth 2 (two) times a day, Starting Tue 12/13/2016, Historical Med       !! - Potential duplicate medications found  Please discuss with provider  No discharge procedures on file  ED Provider  Attending physically available and evaluated Geetha Rivera I managed the patient along with the ED Attending      Electronically Signed by         Earnestine Bae MD  11/02/18 2781

## 2018-11-02 NOTE — ED ATTENDING ATTESTATION
Kyle Kim MD, saw and evaluated the patient  I have discussed the patient with the resident/non-physician practitioner and agree with the resident's/non-physician practitioner's findings, Plan of Care, and MDM as documented in the resident's/non-physician practitioner's note, except where noted  All available labs and Radiology studies were reviewed  At this point I agree with the current assessment done in the Emergency Department  I have conducted an independent evaluation of this patient including a focused history of:    Emergency Department Note- [place] 75 y o  female MRN: [id number]    Unit/Bed#: ED 15 Encounter: [id number]    Bernardo Irizarry is a 75 y o  female who presents with   Chief Complaint   Patient presents with    Back Pain     Patient states she lifted a mattress two days ago and pulled the muscle in her back          History of Present Illness   HPI:  Bernardo Irizarry is a 75 y o  female who presents for evaluation of:  Flare of low back pain after moving a mattress at home  Patient denies direct trauma to her back  Patient denies fevers, perineal anesthesia, and loss of continence  Review of Systems   Constitutional: Negative for fatigue and fever  Genitourinary: Negative for dysuria and flank pain         Historical Information   Past Medical History:   Diagnosis Date    Anxiety     Depression     Disease of thyroid gland     HYPO    Femur neck fracture (HCC)     GERD (gastroesophageal reflux disease)     Hyperthyroidism     RESOLVED: [id number]    Osteoporosis     Polio     PVD (peripheral vascular disease) ([facility] [address] )     Right BBB/left ant fasc block     UTI (urinary tract infection)     Varicella infection     LAST ASSESSED: [id number]     Past Surgical History:   Procedure Laterality Date    APPENDECTOMY      HIP ARTHROPLASTY Left 11/27/2017    Procedure: REMOVAL IM NAIL CONVERSION TO TOTAL HIP ARTHROPLASTY POSTERIOR APPROACH;  Surgeon: Magda Britt MD;  [facility];  Service: Orthopedics    HIP FRACTURE SURGERY      HIP SURGERY      both hips replaced;2013 left ,2014 right    JOINT REPLACEMENT Right     HIP TOTAL    [place] CONV PREV HIP SURG TO TOT HIP ARTHROPLAS Left 10/4/2017    Procedure: Zulema العراقيlles removal of left hip;  Surgeon: Kvng Alvarez MD;  [facility];  Service: Orthopedics    REVISION TOTAL HIP ARTHROPLASTY Right     TONSILLECTOMY       Social History   History   Alcohol Use No     History   Drug Use No     History   Smoking Status    Former Smoker   Smokeless Tobacco    Never Used     Comment: quit 20-30 years ago; NEVER A SMOKER AS PER ALL SCRIPTS      Family History: non-contributory    Meds/Allergies   all medications and allergies reviewed  No Known Allergies    Objective   First Vitals:   Blood Pressure: 139/72 (11/02/18 0353)  Pulse: 80 (11/02/18 0353)  Temperature: 97 9 °F (36 6 °C) (11/02/18 0353)  Temp Source: Oral (11/02/18 0353)  Respirations: 18 (11/02/18 0353)  Height: 5' (152 4 cm) (11/02/18 0353)  Weight - Scale: 47 4 kg (104 lb 8 oz) (11/02/18 0353)  SpO2: 99 % (11/02/18 0353)    Current Vitals:   Blood Pressure: 139/72 (11/02/18 0353)  Pulse: 80 (11/02/18 0353)  Temperature: 97 9 °F (36 6 °C) (11/02/18 0353)  Temp Source: Oral (11/02/18 0353)  Respirations: 18 (11/02/18 0353)  Height: 5' (152 4 cm) (11/02/18 0353)  Weight - Scale: 47 4 kg (104 lb 8 oz) (11/02/18 0353)  SpO2: 99 % (11/02/18 0353)    No intake or output data in the 24 hours ending 11/02/18 0432    Invasive Devices          No matching active lines, drains, or airways          Physical Exam   Constitutional: She is oriented to person, place, and time  She appears well-developed and well-nourished  HENT:   Head: Normocephalic and atraumatic  Musculoskeletal: Normal range of motion  She exhibits no deformity  Neurological: She is alert and oriented to person, place, and time  Skin: Skin is warm and dry  Psychiatric: She has a normal mood and affect   Her behavior is normal  Judgment and thought content normal    Nursing note and vitals reviewed  Medical Decision Makin  Acute low back pain: most likely musculoskeletal; pain control; UA r/o uti    No results found for this or any previous visit (from the past 36 hour(s))  No orders to display         Portions of the record may have been created with voice recognition software  Occasional wrong word or "sound a like" substitutions may have occurred due to the inherent limitations of voice recognition software  Read the chart carefully and recognize, using context, where substitutions have occurred

## 2018-11-12 ENCOUNTER — HOSPITAL ENCOUNTER (INPATIENT)
Facility: HOSPITAL | Age: 73
LOS: 8 days | Discharge: NURSING, REHAB OR LONG-TERM CARE | DRG: 544 | End: 2018-11-21
Attending: EMERGENCY MEDICINE | Admitting: INTERNAL MEDICINE
Payer: MEDICARE

## 2018-11-12 DIAGNOSIS — E03.9 HYPOTHYROIDISM, UNSPECIFIED TYPE: ICD-10-CM

## 2018-11-12 DIAGNOSIS — S32.030A COMPRESSION FRACTURE OF L3 LUMBAR VERTEBRA, CLOSED, INITIAL ENCOUNTER (HCC): ICD-10-CM

## 2018-11-12 DIAGNOSIS — M16.12 ARTHRITIS OF LEFT HIP: Primary | ICD-10-CM

## 2018-11-12 DIAGNOSIS — S72.009D CLOSED FRACTURE OF NECK OF FEMUR WITH ROUTINE HEALING, UNSPECIFIED LATERALITY, SUBSEQUENT ENCOUNTER: ICD-10-CM

## 2018-11-12 PROCEDURE — 99285 EMERGENCY DEPT VISIT HI MDM: CPT

## 2018-11-12 PROCEDURE — 1124F ACP DISCUSS-NO DSCNMKR DOCD: CPT | Performed by: NEUROLOGICAL SURGERY

## 2018-11-13 ENCOUNTER — APPOINTMENT (INPATIENT)
Dept: RADIOLOGY | Facility: HOSPITAL | Age: 73
DRG: 544 | End: 2018-11-13
Payer: MEDICARE

## 2018-11-13 ENCOUNTER — APPOINTMENT (EMERGENCY)
Dept: RADIOLOGY | Facility: HOSPITAL | Age: 73
DRG: 544 | End: 2018-11-13
Payer: MEDICARE

## 2018-11-13 PROBLEM — S32.030A COMPRESSION FRACTURE OF L3 LUMBAR VERTEBRA, CLOSED, INITIAL ENCOUNTER (HCC): Status: ACTIVE | Noted: 2018-11-13

## 2018-11-13 PROBLEM — R26.2 AMBULATORY DYSFUNCTION: Status: ACTIVE | Noted: 2018-11-13

## 2018-11-13 LAB
ALBUMIN SERPL BCP-MCNC: 3.2 G/DL (ref 3.5–5)
ALP SERPL-CCNC: 99 U/L (ref 46–116)
ALT SERPL W P-5'-P-CCNC: 23 U/L (ref 12–78)
ANION GAP SERPL CALCULATED.3IONS-SCNC: 6 MMOL/L (ref 4–13)
ANION GAP SERPL CALCULATED.3IONS-SCNC: 8 MMOL/L (ref 4–13)
AST SERPL W P-5'-P-CCNC: 15 U/L (ref 5–45)
BASOPHILS # BLD AUTO: 0.01 THOUSANDS/ΜL (ref 0–0.1)
BASOPHILS NFR BLD AUTO: 0 % (ref 0–1)
BILIRUB SERPL-MCNC: 0.31 MG/DL (ref 0.2–1)
BUN SERPL-MCNC: 22 MG/DL (ref 5–25)
BUN SERPL-MCNC: 23 MG/DL (ref 5–25)
CALCIUM SERPL-MCNC: 8.4 MG/DL (ref 8.3–10.1)
CALCIUM SERPL-MCNC: 9 MG/DL (ref 8.3–10.1)
CHLORIDE SERPL-SCNC: 103 MMOL/L (ref 100–108)
CHLORIDE SERPL-SCNC: 104 MMOL/L (ref 100–108)
CO2 SERPL-SCNC: 26 MMOL/L (ref 21–32)
CO2 SERPL-SCNC: 28 MMOL/L (ref 21–32)
CREAT SERPL-MCNC: 0.6 MG/DL (ref 0.6–1.3)
CREAT SERPL-MCNC: 0.62 MG/DL (ref 0.6–1.3)
EOSINOPHIL # BLD AUTO: 0.06 THOUSAND/ΜL (ref 0–0.61)
EOSINOPHIL NFR BLD AUTO: 1 % (ref 0–6)
ERYTHROCYTE [DISTWIDTH] IN BLOOD BY AUTOMATED COUNT: 14.8 % (ref 11.6–15.1)
ERYTHROCYTE [SEDIMENTATION RATE] IN BLOOD: 13 MM/HOUR (ref 0–20)
GFR SERPL CREATININE-BSD FRML MDRD: 89 ML/MIN/1.73SQ M
GFR SERPL CREATININE-BSD FRML MDRD: 90 ML/MIN/1.73SQ M
GLUCOSE SERPL-MCNC: 78 MG/DL (ref 65–140)
GLUCOSE SERPL-MCNC: 83 MG/DL (ref 65–140)
HCT VFR BLD AUTO: 36.3 % (ref 34.8–46.1)
HGB BLD-MCNC: 11.4 G/DL (ref 11.5–15.4)
IMM GRANULOCYTES # BLD AUTO: 0.05 THOUSAND/UL (ref 0–0.2)
IMM GRANULOCYTES NFR BLD AUTO: 1 % (ref 0–2)
LYMPHOCYTES # BLD AUTO: 1.48 THOUSANDS/ΜL (ref 0.6–4.47)
LYMPHOCYTES NFR BLD AUTO: 18 % (ref 14–44)
MCH RBC QN AUTO: 28.6 PG (ref 26.8–34.3)
MCHC RBC AUTO-ENTMCNC: 31.4 G/DL (ref 31.4–37.4)
MCV RBC AUTO: 91 FL (ref 82–98)
MONOCYTES # BLD AUTO: 0.75 THOUSAND/ΜL (ref 0.17–1.22)
MONOCYTES NFR BLD AUTO: 9 % (ref 4–12)
NEUTROPHILS # BLD AUTO: 5.8 THOUSANDS/ΜL (ref 1.85–7.62)
NEUTS SEG NFR BLD AUTO: 71 % (ref 43–75)
NRBC BLD AUTO-RTO: 0 /100 WBCS
PLATELET # BLD AUTO: 364 THOUSANDS/UL (ref 149–390)
PMV BLD AUTO: 8.8 FL (ref 8.9–12.7)
POTASSIUM SERPL-SCNC: 3.5 MMOL/L (ref 3.5–5.3)
POTASSIUM SERPL-SCNC: 3.5 MMOL/L (ref 3.5–5.3)
PROT SERPL-MCNC: 6.7 G/DL (ref 6.4–8.2)
PTH-INTACT SERPL-MCNC: 54.5 PG/ML (ref 18.4–80.1)
RBC # BLD AUTO: 3.98 MILLION/UL (ref 3.81–5.12)
SODIUM SERPL-SCNC: 137 MMOL/L (ref 136–145)
SODIUM SERPL-SCNC: 138 MMOL/L (ref 136–145)
TSH SERPL DL<=0.05 MIU/L-ACNC: 9.58 UIU/ML (ref 0.36–3.74)
WBC # BLD AUTO: 8.15 THOUSAND/UL (ref 4.31–10.16)

## 2018-11-13 PROCEDURE — 80048 BASIC METABOLIC PNL TOTAL CA: CPT | Performed by: EMERGENCY MEDICINE

## 2018-11-13 PROCEDURE — 36415 COLL VENOUS BLD VENIPUNCTURE: CPT | Performed by: EMERGENCY MEDICINE

## 2018-11-13 PROCEDURE — 99222 1ST HOSP IP/OBS MODERATE 55: CPT | Performed by: ORTHOPAEDIC SURGERY

## 2018-11-13 PROCEDURE — 73564 X-RAY EXAM KNEE 4 OR MORE: CPT

## 2018-11-13 PROCEDURE — 85025 COMPLETE CBC W/AUTO DIFF WBC: CPT | Performed by: EMERGENCY MEDICINE

## 2018-11-13 PROCEDURE — 96374 THER/PROPH/DIAG INJ IV PUSH: CPT

## 2018-11-13 PROCEDURE — 85652 RBC SED RATE AUTOMATED: CPT | Performed by: STUDENT IN AN ORGANIZED HEALTH CARE EDUCATION/TRAINING PROGRAM

## 2018-11-13 PROCEDURE — 72220 X-RAY EXAM SACRUM TAILBONE: CPT

## 2018-11-13 PROCEDURE — 72100 X-RAY EXAM L-S SPINE 2/3 VWS: CPT

## 2018-11-13 PROCEDURE — 96375 TX/PRO/DX INJ NEW DRUG ADDON: CPT

## 2018-11-13 PROCEDURE — 99223 1ST HOSP IP/OBS HIGH 75: CPT | Performed by: INTERNAL MEDICINE

## 2018-11-13 PROCEDURE — 83970 ASSAY OF PARATHORMONE: CPT | Performed by: STUDENT IN AN ORGANIZED HEALTH CARE EDUCATION/TRAINING PROGRAM

## 2018-11-13 PROCEDURE — 72170 X-RAY EXAM OF PELVIS: CPT

## 2018-11-13 PROCEDURE — 80053 COMPREHEN METABOLIC PANEL: CPT | Performed by: STUDENT IN AN ORGANIZED HEALTH CARE EDUCATION/TRAINING PROGRAM

## 2018-11-13 PROCEDURE — 99223 1ST HOSP IP/OBS HIGH 75: CPT | Performed by: NEUROLOGICAL SURGERY

## 2018-11-13 PROCEDURE — 84443 ASSAY THYROID STIM HORMONE: CPT | Performed by: STUDENT IN AN ORGANIZED HEALTH CARE EDUCATION/TRAINING PROGRAM

## 2018-11-13 RX ORDER — GABAPENTIN 100 MG/1
100 CAPSULE ORAL 3 TIMES DAILY
Status: DISCONTINUED | OUTPATIENT
Start: 2018-11-13 | End: 2018-11-20

## 2018-11-13 RX ORDER — PREDNISONE 10 MG/1
10 TABLET ORAL DAILY
Status: DISCONTINUED | OUTPATIENT
Start: 2018-11-13 | End: 2018-11-21 | Stop reason: HOSPADM

## 2018-11-13 RX ORDER — OXYCODONE HYDROCHLORIDE 5 MG/1
2.5 TABLET ORAL EVERY 4 HOURS PRN
Status: DISCONTINUED | OUTPATIENT
Start: 2018-11-13 | End: 2018-11-19

## 2018-11-13 RX ORDER — NORTRIPTYLINE HYDROCHLORIDE 25 MG/1
25 CAPSULE ORAL
Status: DISCONTINUED | OUTPATIENT
Start: 2018-11-13 | End: 2018-11-15

## 2018-11-13 RX ORDER — MELATONIN
1000 DAILY
Status: DISCONTINUED | OUTPATIENT
Start: 2018-11-13 | End: 2018-11-21 | Stop reason: HOSPADM

## 2018-11-13 RX ORDER — MAGNESIUM HYDROXIDE/ALUMINUM HYDROXICE/SIMETHICONE 120; 1200; 1200 MG/30ML; MG/30ML; MG/30ML
30 SUSPENSION ORAL EVERY 6 HOURS PRN
Status: DISCONTINUED | OUTPATIENT
Start: 2018-11-13 | End: 2018-11-21 | Stop reason: HOSPADM

## 2018-11-13 RX ORDER — VITAMIN E 268 MG
400 CAPSULE ORAL DAILY
Status: DISCONTINUED | OUTPATIENT
Start: 2018-11-13 | End: 2018-11-21 | Stop reason: HOSPADM

## 2018-11-13 RX ORDER — DOCUSATE SODIUM 100 MG/1
100 CAPSULE, LIQUID FILLED ORAL 2 TIMES DAILY PRN
Status: DISCONTINUED | OUTPATIENT
Start: 2018-11-13 | End: 2018-11-21 | Stop reason: HOSPADM

## 2018-11-13 RX ORDER — KETOROLAC TROMETHAMINE 30 MG/ML
30 INJECTION, SOLUTION INTRAMUSCULAR; INTRAVENOUS ONCE
Status: COMPLETED | OUTPATIENT
Start: 2018-11-13 | End: 2018-11-13

## 2018-11-13 RX ORDER — ONDANSETRON 2 MG/ML
4 INJECTION INTRAMUSCULAR; INTRAVENOUS EVERY 6 HOURS PRN
Status: DISCONTINUED | OUTPATIENT
Start: 2018-11-13 | End: 2018-11-21 | Stop reason: HOSPADM

## 2018-11-13 RX ORDER — HYDROMORPHONE HCL/PF 1 MG/ML
0.5 SYRINGE (ML) INJECTION ONCE
Status: COMPLETED | OUTPATIENT
Start: 2018-11-13 | End: 2018-11-13

## 2018-11-13 RX ORDER — ASCORBIC ACID 500 MG
1000 TABLET ORAL DAILY
Status: DISCONTINUED | OUTPATIENT
Start: 2018-11-13 | End: 2018-11-21 | Stop reason: HOSPADM

## 2018-11-13 RX ORDER — FOLIC ACID 1 MG/1
1000 TABLET ORAL DAILY
Status: DISCONTINUED | OUTPATIENT
Start: 2018-11-13 | End: 2018-11-21 | Stop reason: HOSPADM

## 2018-11-13 RX ORDER — LEVOTHYROXINE SODIUM 0.03 MG/1
25 TABLET ORAL
Status: DISCONTINUED | OUTPATIENT
Start: 2018-11-13 | End: 2018-11-14

## 2018-11-13 RX ORDER — LIDOCAINE 50 MG/G
2 PATCH TOPICAL ONCE
Status: COMPLETED | OUTPATIENT
Start: 2018-11-13 | End: 2018-11-13

## 2018-11-13 RX ORDER — IBUPROFEN 400 MG/1
400 TABLET ORAL EVERY 6 HOURS PRN
Status: DISCONTINUED | OUTPATIENT
Start: 2018-11-13 | End: 2018-11-20

## 2018-11-13 RX ORDER — DIAZEPAM 5 MG/1
5 TABLET ORAL EVERY 12 HOURS PRN
Status: DISCONTINUED | OUTPATIENT
Start: 2018-11-13 | End: 2018-11-21 | Stop reason: HOSPADM

## 2018-11-13 RX ORDER — HYDROMORPHONE HCL/PF 1 MG/ML
0.25 SYRINGE (ML) INJECTION EVERY 4 HOURS PRN
Status: DISCONTINUED | OUTPATIENT
Start: 2018-11-13 | End: 2018-11-19

## 2018-11-13 RX ORDER — LIDOCAINE 50 MG/G
2 PATCH TOPICAL DAILY
Status: COMPLETED | OUTPATIENT
Start: 2018-11-13 | End: 2018-11-13

## 2018-11-13 RX ORDER — DOCUSATE SODIUM 100 MG/1
100 CAPSULE, LIQUID FILLED ORAL 2 TIMES DAILY
Status: DISCONTINUED | OUTPATIENT
Start: 2018-11-13 | End: 2018-11-21 | Stop reason: HOSPADM

## 2018-11-13 RX ADMIN — DOCUSATE SODIUM 100 MG: 100 CAPSULE, LIQUID FILLED ORAL at 18:31

## 2018-11-13 RX ADMIN — HYDROMORPHONE HYDROCHLORIDE 0.5 MG: 1 INJECTION, SOLUTION INTRAMUSCULAR; INTRAVENOUS; SUBCUTANEOUS at 01:50

## 2018-11-13 RX ADMIN — DOCUSATE SODIUM 100 MG: 100 CAPSULE, LIQUID FILLED ORAL at 08:58

## 2018-11-13 RX ADMIN — HYDROMORPHONE HYDROCHLORIDE 0.25 MG: 1 INJECTION, SOLUTION INTRAMUSCULAR; INTRAVENOUS; SUBCUTANEOUS at 14:54

## 2018-11-13 RX ADMIN — IBUPROFEN 400 MG: 400 TABLET ORAL at 05:34

## 2018-11-13 RX ADMIN — LEVOTHYROXINE SODIUM 25 MCG: 25 TABLET ORAL at 05:34

## 2018-11-13 RX ADMIN — OXYCODONE HYDROCHLORIDE 2.5 MG: 5 TABLET ORAL at 18:31

## 2018-11-13 RX ADMIN — OXYCODONE HYDROCHLORIDE 2.5 MG: 5 TABLET ORAL at 05:34

## 2018-11-13 RX ADMIN — GABAPENTIN 100 MG: 100 CAPSULE ORAL at 20:23

## 2018-11-13 RX ADMIN — IBUPROFEN 400 MG: 400 TABLET ORAL at 12:23

## 2018-11-13 RX ADMIN — OXYCODONE HYDROCHLORIDE AND ACETAMINOPHEN 1000 MG: 500 TABLET ORAL at 08:59

## 2018-11-13 RX ADMIN — LIDOCAINE 2 PATCH: 50 PATCH CUTANEOUS at 09:16

## 2018-11-13 RX ADMIN — PREDNISONE 10 MG: 10 TABLET ORAL at 08:58

## 2018-11-13 RX ADMIN — Medication 1 TABLET: at 08:59

## 2018-11-13 RX ADMIN — LIDOCAINE 2 PATCH: 50 PATCH CUTANEOUS at 00:53

## 2018-11-13 RX ADMIN — HYDROMORPHONE HYDROCHLORIDE 0.25 MG: 1 INJECTION, SOLUTION INTRAMUSCULAR; INTRAVENOUS; SUBCUTANEOUS at 19:56

## 2018-11-13 RX ADMIN — PSYLLIUM HUSK 1 PACKET: 3.4 POWDER ORAL at 18:38

## 2018-11-13 RX ADMIN — GABAPENTIN 100 MG: 100 CAPSULE ORAL at 09:00

## 2018-11-13 RX ADMIN — ENOXAPARIN SODIUM 40 MG: 40 INJECTION SUBCUTANEOUS at 09:05

## 2018-11-13 RX ADMIN — GABAPENTIN 100 MG: 100 CAPSULE ORAL at 18:31

## 2018-11-13 RX ADMIN — OXYCODONE HYDROCHLORIDE 2.5 MG: 5 TABLET ORAL at 13:57

## 2018-11-13 RX ADMIN — VITAMIN E CAP 400 UNIT 400 UNITS: 400 CAP at 08:59

## 2018-11-13 RX ADMIN — KETOROLAC TROMETHAMINE 30 MG: 30 INJECTION, SOLUTION INTRAMUSCULAR at 00:54

## 2018-11-13 RX ADMIN — DIAZEPAM 5 MG: 5 TABLET ORAL at 20:23

## 2018-11-13 RX ADMIN — VITAMIN D, TAB 1000IU (100/BT) 1000 UNITS: 25 TAB at 08:59

## 2018-11-13 RX ADMIN — HYDROMORPHONE HYDROCHLORIDE 0.25 MG: 1 INJECTION, SOLUTION INTRAMUSCULAR; INTRAVENOUS; SUBCUTANEOUS at 09:02

## 2018-11-13 RX ADMIN — FOLIC ACID 1000 MCG: 1 TABLET ORAL at 08:58

## 2018-11-13 NOTE — CONSULTS
Now with Consultation - Neurosurgery   Stefany Rivera 75 y o  female MRN: [id number]  Unit/Bed#: ED 21 Encounter: [id number]  Consult completed on 11/13/2018 at 12:00pm    Inpatient consult to Neurosurgery  Consult performed by: Keily Justin ordered by: Josephine Mariscal        Assessment/Plan     Assessment:  1  L3 compression fracture with mild retropulsion  2  Status post fall  3  Ambulatory dysfunction  4  Arthritis the left hip  5  Hypothyroidism  6  Osteoporosis  7  Peripheral vascular disease  8  Restless leg syndrome - takes motrin daily  9  History total hip arthroplasty    Plan:  · Exam:  Alert and oriented x 3, ORNELAS with weakness in BLE inhibited by pain, decreased sensation to PP on radial aspect of left forearm, L4 LUE and L4-5 RLE, reflexes 3+ and brisk, no clonus noted, +right dominguez, DST intact, JPS 3/3, midline and paraspinal tenderness in low back  · Imaging reviewed personally and with attending  Results are as follows:  · XR sacrum and coccyx (11/13/2018): Compression fracture of L3, with approximately 25-33% loss of height  · XR lumbar spine (11/13/2018): Compression fracture of L3 that is new from 2014 study  Mild retropulsion but no high-grade spinal canal narrowing is seen at the level  Spinal alignment is maintained  · XR pelvis (11/13/2018): Compression fracture of L3, with approximately 25-33% loss of height  · LSO brace ordered - to be worn OOB and HOB < 45 degrees  · Upright XR lumbar ordered and pending for today; must wear brace for imaging  · Hold AC / AP - patient admits to daily use of motrin at home  · Medical management per primary team  · DVT ppx: SCDs and lovenox  · Mobilize as tolerated with assistance  · Discussed imaging results with patient and the use of an LSO brace to assist with pain associated with movement and improve stability of the spine  Upright XR lumbar wearing brace will be ordered  Patient understands    · Neurosurgery will continue to follow pending upright lumbar XR  No surgical intervention is recommended at this time  Please call with questions or concerns  History of Present Illness   HPI: Óscar Osei is a 75y o  year old female with PMH including ambulatory dysfunction, arthritis of the left hip with total hip arthoplasty, hypothyroidism, osteoporosis, peripheral artery disease, restless leg syndrome on daily motrin who presented to the ED with low back pain status post fall  X-ray of the lumbar spine shows compression of L3 with approximately 25-33% loss of height, mild retropulsion but no high-grade spinal canal narrowing  Patient reports she was moving a mattress 3 weeks ago when she felt a crack in her spine  One week later she was trying to get out of bed slid off the bed  No loss of consciousness or head trauma  Since then, she had 10/10 sharp low back pain that radiated to the right leg  Right leg pain is constant  Patient states she took Motrin which helped a little bit and hydrocodone which she got from her sister which alleviated pain greatly  She notes leg weakness worse on the right  At baseline patient has left leg numbness due to prior surgeries, denies new numbness or tingling  The patient denies headache, dizziness, chest pain, shortness of breath, confusion, neck pain, bowel / bladder problems  Patient admits to taking 600 mg of Motrin TID For her restless leg syndrome  She states she thinks that causes her to have frequency  She denies use of other blood thinners  Review of Systems   Constitutional: Negative for chills and fever  HENT: Negative for hearing loss, tinnitus and trouble swallowing  Eyes: Negative for visual disturbance  Respiratory: Negative for chest tightness and shortness of breath  Cardiovascular: Negative for chest pain  Gastrointestinal: Negative for abdominal pain, constipation, diarrhea, nausea and vomiting  Genitourinary: Positive for frequency (states due to motrin)  Negative for difficulty urinating  Musculoskeletal: Positive for arthralgias (right knee) and back pain  Negative for neck pain  Skin: Negative for rash and wound  Allergic/Immunologic: Negative for environmental allergies and food allergies  Neurological: Positive for weakness (RLE>LLE)  Negative for dizziness, numbness and headaches  Hematological: Does not bruise/bleed easily  Psychiatric/Behavioral: Negative for confusion  The patient is not nervous/anxious          Historical Information   Past Medical History:   Diagnosis Date    Anxiety     Depression     Disease of thyroid gland     HYPO    Femur neck fracture (HCC)     GERD (gastroesophageal reflux disease)     Hyperthyroidism     RESOLVED: [id number]    Osteoporosis     Polio     PVD (peripheral vascular disease) (HCC)     Right BBB/left ant fasc block     UTI (urinary tract infection)     Varicella infection     LAST ASSESSED: [id number]     Past Surgical History:   Procedure Laterality Date    APPENDECTOMY      HIP ARTHROPLASTY Left 11/27/2017    Procedure: REMOVAL IM NAIL CONVERSION TO TOTAL HIP ARTHROPLASTY POSTERIOR APPROACH;  Surgeon: Edwin Padron MD;  Location: [facility];  Service: Orthopedics    HIP FRACTURE SURGERY      HIP SURGERY      both hips replaced;2013 left ,2014 right    JOINT REPLACEMENT Right     HIP TOTAL    WA CONV PREV HIP SURG TO TOT HIP Earnestine De La Rosa Left 10/4/2017    Procedure: Allison Carmelo removal of left hip;  Surgeon: Edwin Padron MD;  Location: [facility];  Service: Orthopedics    REVISION TOTAL HIP ARTHROPLASTY Right     TONSILLECTOMY       History   Alcohol Use No     History   Drug Use No     History   Smoking Status    Former Smoker   Smokeless Tobacco    Never Used     Comment: quit 20-30 years ago; NEVER A SMOKER AS PER ALL SCRIPTS      Family History   Problem Relation Age of Onset    Rheum arthritis Mother     Rheum arthritis Sister     Prostate cancer Brother        Meds/Allergies all current active meds have been reviewed, current meds:   Current Facility-Administered Medications   Medication Dose Route Frequency    aluminum-magnesium hydroxide-simethicone (MYLANTA) 200-200-20 mg/5 mL oral suspension 30 mL  30 mL Oral Q6H PRN    ascorbic acid (VITAMIN C) tablet 1,000 mg  1,000 mg Oral Daily    cholecalciferol (VITAMIN D3) tablet 1,000 Units  1,000 Units Oral Daily    diazepam (VALIUM) tablet 5 mg  5 mg Oral Q12H PRN    docusate sodium (COLACE) capsule 100 mg  100 mg Oral BID    docusate sodium (COLACE) capsule 100 mg  100 mg Oral BID PRN    enoxaparin (LOVENOX) subcutaneous injection 40 mg  40 mg Subcutaneous Daily    folic acid (FOLVITE) tablet 1,000 mcg  1,000 mcg Oral Daily    gabapentin (NEURONTIN) capsule 100 mg  100 mg Oral TID    HYDROmorphone (DILAUDID) injection 0 25 mg  0 25 mg Intravenous Q4H PRN    ibuprofen (MOTRIN) tablet 400 mg  400 mg Oral Q6H PRN    levothyroxine tablet 25 mcg  25 mcg Oral Early Morning    lidocaine (LIDODERM) 5 % patch 2 patch  2 patch Topical Daily    multivitamin-minerals (CENTRUM) tablet 1 tablet  1 tablet Oral Daily    nortriptyline (PAMELOR) capsule 25 mg  25 mg Oral HS    ondansetron (ZOFRAN) injection 4 mg  4 mg Intravenous Q6H PRN    oxyCODONE (ROXICODONE) IR tablet 2 5 mg  2 5 mg Oral Q4H PRN    predniSONE tablet 10 mg  10 mg Oral Daily    psyllium (METAMUCIL) 1 packet  1 packet Oral TID    vitamin E (tocopherol) capsule 400 Units  400 Units Oral Daily    and PTA meds:   Prior to Admission Medications   Prescriptions Last Dose Informant Patient Reported? Taking?    Ascorbic Acid (VITAMIN C) 1000 MG tablet 11/12/2018 at Unknown time Self Yes Yes   Sig: Take 1,000 mg by mouth daily   Multiple Vitamins-Minerals (CENTRUM SILVER PO) 11/12/2018 at Unknown time Self Yes Yes   Sig: Take 1 tablet by mouth daily   Psyllium (METAMUCIL FIBER PO) 11/12/2018 at Unknown time Self Yes Yes   Sig: Take 1 packet by mouth 3 (three) times a day Vitamin E 400 units TABS 11/12/2018 at Unknown time Self Yes Yes   Sig: Take 400 Units by mouth 2 (two) times a day   alendronate (FOSAMAX) 70 mg tablet 11/12/2018 at Unknown time  No Yes   Sig: TAKE 1 TABLET ONCE WEEKLY   cholecalciferol (VITAMIN D3) 1,000 units tablet 11/12/2018 at Unknown time Self Yes Yes   Sig: Take 1,000 Units by mouth daily   diazepam (VALIUM) 5 mg tablet 11/12/2018 at Unknown time  No Yes   Sig: TAKE ONE TABLET BY MOUTH EVERY 12 HOURS AS NEEDED FOR ANXIETY  docusate sodium (COLACE) 100 mg capsule 11/12/2018 at Unknown time Self No Yes   Sig: Take 1 capsule by mouth 2 (two) times a day   folic acid (FOLVITE) 1 mg tablet 11/12/2018 at Unknown time  No Yes   Sig: TAKE ONE TABLET BY MOUTH EVERY DAY   gabapentin (NEURONTIN) 100 mg capsule 11/12/2018 at Unknown time Self No Yes   Sig: Take 1 capsule (100 mg total) by mouth 3 (three) times a day   ibuprofen (MOTRIN) 400 mg tablet 11/12/2018 at Unknown time  No Yes   Sig: Take 1 tablet (400 mg total) by mouth every 6 (six) hours as needed for mild pain   levothyroxine 25 mcg tablet 11/12/2018 at Unknown time Self No Yes   Sig: TAKE ONE TABLET BY MOUTH EVERY DAY   nortriptyline (PAMELOR) 10 mg capsule 11/12/2018 at Unknown time Self No Yes   Sig: TAKE ONE CAPSULE BY MOUTH AT BEDTIME   nortriptyline (PAMELOR) 25 mg capsule 11/12/2018 at Unknown time Self No Yes   Sig: TAKE ONE CAPSULE BY MOUTH AT BEDTIME   predniSONE 10 mg tablet 11/12/2018 at Unknown time  Yes Yes   Sig: Take 10 mg by mouth daily      Facility-Administered Medications: None     No Known Allergies    Objective   I/O     None          Physical Exam   Constitutional: She is oriented to person, place, and time  Vital signs are normal  She appears well-developed and well-nourished  She is cooperative  HENT:   Head: Normocephalic and atraumatic  Eyes: Pupils are equal, round, and reactive to light   Conjunctivae and EOM are normal    Neck: No spinous process tenderness and no muscular tenderness present  Cardiovascular: Normal rate  Pulmonary/Chest: She is in respiratory distress  Musculoskeletal:        Cervical back: She exhibits no tenderness  Thoracic back: She exhibits no tenderness  Lumbar back: She exhibits tenderness and pain  Neurological: She is alert and oriented to person, place, and time  She has a normal Finger-Nose-Finger Test    Reflex Scores:       Bicep reflexes are 3+ on the right side and 3+ on the left side  Brachioradialis reflexes are 3+ on the right side and 3+ on the left side  Patellar reflexes are 3+ on the right side and 3+ on the left side  Achilles reflexes are 2+ on the right side and 2+ on the left side  Skin: Skin is warm, dry and intact  Psychiatric: She has a normal mood and affect  Her speech is normal and behavior is normal  Judgment and thought content normal  Cognition and memory are normal      Neurologic Exam     Mental Status   Oriented to person, place, and time  Registration: recalls 3 of 3 objects  Recall at 5 minutes: recalls 1 of 3 objects  Attention: normal  Concentration: normal    Speech: speech is normal   Level of consciousness: alert  Knowledge: good  Able to perform simple calculations  Able to name object  Able to repeat  Normal comprehension  Cranial Nerves     CN II   Right visual field deficit: none  Left visual field deficit: none     CN III, IV, VI   Pupils are equal, round, and reactive to light    Extraocular motions are normal    CN III: no CN III palsy  CN VI: no CN VI palsy  Nystagmus: none   Diplopia: none  Ophthalmoparesis: none  Upgaze: normal  Downgaze: normal  Conjugate gaze: present    CN V   Right facial sensation deficit: none  Left facial sensation deficit: none    CN VII   Right facial weakness: none  Left facial weakness: none    CN VIII   Hearing: intact    CN IX, X   CN IX normal    CN X normal      CN XI   Right trapezius strength: normal  Left trapezius strength: normal    CN XII   CN XII normal      Motor Exam   Muscle bulk: normal  Overall muscle tone: normal  Right arm pronator drift: absent  Left arm pronator drift: absent    Strength   Strength 5/5 except as noted  Inhibited by pain  RLE: 3/5 HF/HE/KE/KF  LLE:  3+/5 HF/HE/KE/KF     Sensory Exam   Light touch normal    Proprioception normal    Right arm pinprick: normal  Left arm pinprick: decreased from elbow (radial aspect including thumb)  Right leg pinprick: decreased from knee (medial and lateral aspect of distal LE)  Left leg pinprick: decreased from knee (medial aspects of distal LE)    DST intact     Gait, Coordination, and Reflexes     Coordination   Finger to nose coordination: normal    Tremor   Resting tremor: absent  Intention tremor: absent  Action tremor: absent    Reflexes   Right brachioradialis: 3+  Left brachioradialis: 3+  Right biceps: 3+  Left biceps: 3+  Right patellar: 3+  Left patellar: 3+  Right achilles: 2+  Left achilles: 2+  Right : 2+  Left : 2+  Right Camacho: present  Left Camacho: absent  Right ankle clonus: absent  Left ankle clonus: absent      Vitals:Blood pressure 143/78, pulse 74, temperature 98 °F (36 7 °C), temperature source Tympanic, resp  rate 22, weight 47 4 kg (104 lb 8 oz), SpO2 98 %, currently breastfeeding  ,Body mass index is 20 41 kg/m²       Lab Results:     Results from last 7 days  Lab Units 11/13/18  0152   WBC Thousand/uL 8 15   HEMOGLOBIN g/dL 11 4*   HEMATOCRIT % 36 3   PLATELETS Thousands/uL 364   NEUTROS PCT % 71   MONOS PCT % 9       Results from last 7 days  Lab Units 11/13/18  0152   POTASSIUM mmol/L 3 5  3 5   CHLORIDE mmol/L 103  104   CO2 mmol/L 26  28   BUN mg/dL 23  22   CREATININE mg/dL 0 60  0 62   CALCIUM mg/dL 8 4  9 0   ALK PHOS U/L 99   ALT U/L 23   AST U/L 15                 No results found for: TROPONINT  ABG:No results found for: PHART, TKX3PDF, PO2ART, FHA1SHI, N5RGVOFV, BEART, SOURCE    Imaging Studies: I have personally reviewed pertinent reports  and I have personally reviewed pertinent films in PACS  Xr Lumbar Spine 2 Or 3 Views    Result Date: 11/13/2018  Impression: Compression fracture of L3 as described, age indeterminant but new from the 2014 exam   Correlation with the patient's symptoms recommended  Mild retropulsion but no high-grade spinal canal narrowing is seen at this level  Spinal alignment is maintained  Visualized bones otherwise appear intact  Other findings as above  Workstation performed: [id number]     Xr Sacrum And Coccyx    Result Date: 11/13/2018  Impression: Compression fracture of L3, with approximately 25-33% loss of height; age indeterminate, better seen on the dedicated lumbar spine views  Visualized bones otherwise appear intact  Other findings as above  Workstation performed: [id number]     Xr Pelvis Ap Only 1 Or 2 Vw    Result Date: 11/13/2018  Impression: Compression fracture of L3, with approximately 25-33% loss of height; age indeterminate, better seen on the dedicated lumbar spine views  Visualized bones otherwise appear intact  Other findings as above  Workstation performed: [id number]     EKG, Pathology, and Other Studies: I have personally reviewed pertinent reports  VTE Prophylaxis: Sequential compression device (Venodyne)  and Enoxaparin (Lovenox)    Code Status: Level 1 - Full Code  Advance Directive and Living Will:      Power of :    POLST:      Counseling / Coordination of Care  I spent 45 minutes with the patient

## 2018-11-13 NOTE — PLAN OF CARE
PAIN - ADULT     Verbalizes/displays adequate comfort level or baseline comfort level Not Progressing          Potential for Falls     Patient will remain free of falls Progressing        SAFETY ADULT     Maintain or return to baseline ADL function Progressing     Maintain or return mobility status to optimal level Progressing

## 2018-11-13 NOTE — ASSESSMENT & PLAN NOTE
· Likely multifactorial   · She had a fall two weeks ago at home with increasing pain since then  · Imaging shows L3 compression fracture  · S/p total hip arthroplasty, orthopedics consulted, do not feel her sx are related to her hip arthroplasty and more so likely related to L3 compression fracture and recommend neurosurgery consult  · Continue PRN pain medications  · Will obtain X-ray of right knee as she is complaining of knee pain as well since she fell  · Consult neurosurgery for input  · PT/OT

## 2018-11-13 NOTE — ED NOTES
X-ray called to do her testing, however the pt does not yet have her brace for the xray  We also talked about the knee xray that is in to be done  I let them know that it would be ok to hold off on the knee till we get the brace and all testing can be done together        Coni Baker, RN  11/13/18 2800

## 2018-11-13 NOTE — ASSESSMENT & PLAN NOTE
Ambulatory dysfunction is multifactorial from the problems of the hips status post arthroplasty as well as the discovered L3 compression fracture  Patient has appointment with Dr Clary Ding however increased pain prompted patient to come to the emergency room  Will start geriatric pain protocol with heat compresses as per protocol; lidocaine patches to continue  Orthopedics consult  Occupational therapy consult

## 2018-11-13 NOTE — ASSESSMENT & PLAN NOTE
· Patient denies hip pain at this time, seen by ortho and do not suspect her back pain is associated with her total hip arthroplasty   · F/u with ortho outpatient

## 2018-11-13 NOTE — H&P
H&P- Estephania Rahman Colon 1945, 75 y o  female MRN: [id number]    Unit/Bed#: ED 21 Encounter: [id number]    Primary Care Provider: Vianey Rosa MD   Date and time admitted to hospital: 11/12/2018 11:37 PM        * Ambulatory dysfunction   Assessment & Plan    Ambulatory dysfunction is multifactorial from the problems of the hips status post arthroplasty as well as the discovered L3 compression fracture  Patient has appointment with Dr Irwin Nathan however increased pain prompted patient to come to the emergency room  Will start geriatric pain protocol with heat compresses as per protocol; lidocaine patches to continue  Orthopedics consult  Occupational therapy consult  Compression fracture of L3 lumbar vertebra, closed, initial encounter [facility])   Assessment & Plan    Please see plans regarding ambulatory dysfunction  Lidocaine patch  S/P total hip arthroplasty   Assessment & Plan    Patient would see orthopedics (Jerzy)  Hypothyroidism   Assessment & Plan    Continue thyroid medication  VTE Prophylaxis: Enoxaparin (Lovenox)  / sequential compression device   Code Status: Prior full Code as discussed with patient  POLST: There is no POLST form on file for this patient (pre-hospital)    Anticipated Length of Stay:  Patient will be admitted on an Inpatient basis with an anticipated length of stay of  greater than 2 midnights  Justification for Hospital Stay: Please see detailed plans noted above  Chief Complaint:     Inability to walk with increased back pain radiating towards the right hip to the knee  History of Present Illness:  Issac Quintero is a 75 y o  female who has a past medical history significant for osteoporosis hypothyroidism, hypertension, with status post arthroplasty as well as ambulatory dysfunction  Patient sees Orthopedics for follow-up of arthroplasty in ambulatory dysfunction    Noted is that patient was here earlier part of this month because after moving mattress, the patient experience increasing back pain which radiates more to the year right hip as well as right buttock towards the right knee  With that, the patient is supposed to see her orthopedic surgeon [facility]) for an outpatient follow-up and to discuss further plans  However, the patient has increasing pain that she could no longer stand it and that said the patient went to the emergency room of [facility] instead  Patient denies any incontinence  Noted that initially after the first ER visit, the patient was able to ambulate with supportive treatment including Toradol, Lidoderm and acetaminophen  However over the past 2 weeks or so there is increasing pain and hence more ambulatory dysfunction and hence the ER visit      Review of Systems:    Constitutional:  Denies fever or chills   Eyes:  Denies change in visual acuity   HENT:  Denies nasal congestion or sore throat   Respiratory:  Denies cough or shortness of breath   Cardiovascular:  Denies chest pain or edema   GI:  Denies abdominal pain, nausea, vomiting, bloody stools or diarrhea   :  Denies dysuria   Musculoskeletal:  Back pain located at the lumbar area as well as the paraspinal lumbar area on the right side radiate towards the buttock towards the right knee  Integument:  Denies rash   Neurologic:  Denies headache, focal weakness or sensory changes   Endocrine:  Denies polyuria or polydipsia   Lymphatic:  Denies swollen glands   Psychiatric:  Denies depression or anxiety     Past Medical and Surgical History:   Past Medical History:   Diagnosis Date    Anxiety     Depression     Disease of thyroid gland     HYPO    Femur neck fracture (HCC)     GERD (gastroesophageal reflux disease)     Hyperthyroidism     RESOLVED: [id number]    Osteoporosis     Polio     PVD [facility]peripheral vascular disease) ([facility] [address] )     Right BBB/left ant fasc block     UTI (urinary tract infection)     Varicella infection     LAST ASSESSED: [id number]     Past Surgical History:   Procedure Laterality Date    APPENDECTOMY      HIP ARTHROPLASTY Left 11/27/2017    Procedure: REMOVAL IM NAIL CONVERSION TO TOTAL HIP ARTHROPLASTY POSTERIOR APPROACH;  Surgeon: Sandra Banuelos MD;  Location: [facility];  Service: Orthopedics    HIP FRACTURE SURGERY      HIP SURGERY      both hips replaced;2013 left ,2014 right    JOINT REPLACEMENT Right     HIP TOTAL    CO CONV PREV HIP SURG TO TOT HIP ARTHROPLAS Left 10/4/2017    Procedure: Kehinde Champ removal of left hip;  Surgeon: Sandra Banuelos MD;  Location: [facility];  Service: Orthopedics    REVISION TOTAL HIP ARTHROPLASTY Right     TONSILLECTOMY         Meds/Allergies:  alendronate (FOSAMAX) 70 mg tablet TAKE 1 TABLET ONCE WEEKLY Noah Tapia MD Not Ordered   Ascorbic Acid (VITAMIN C) 1000 MG tablet Take 1,000 mg by mouth daily Noah Tapia MD Reordered   Ordered as: ascorbic acid (VITAMIN C) tablet 1,000 mg - 1,000 mg, Oral, Daily, First dose on Tue 11/13/18 at 0900   cholecalciferol (VITAMIN D3) 1,000 units tablet Take 1,000 Units by mouth daily Noah Tapia MD Reordered   Ordered as: cholecalciferol (VITAMIN D3) tablet 1,000 Units - 1,000 Units, Oral, Daily, First dose on Tue 11/13/18 at 0900   diazepam (VALIUM) 5 mg tablet TAKE ONE TABLET BY MOUTH EVERY 12 HOURS AS NEEDED FOR ANXIETY  Noah Tapia MD Reordered   Ordered as: diazepam (VALIUM) tablet 5 mg - 5 mg, Oral, Every 12 hours PRN, anxiety, Starting Tue 11/13/18 at 0304 High alert medication      docusate sodium (COLACE) 100 mg capsule Take 1 capsule by mouth 2 (two) times a day Noah Tapia MD Reordered   Ordered as: docusate sodium (COLACE) capsule 100 mg - 100 mg, Oral, 2 times daily, First dose on Tue 77/36/86 at 3888   folic acid (FOLVITE) 1 mg tablet TAKE ONE TABLET BY MOUTH EVERY DAY Noah Tapia MD Reordered   Ordered as: folic acid (FOLVITE) tablet 1,000 mcg - 1,000 mcg, Oral, Daily, First dose on Tue 11/13/18 at 0900   gabapentin (NEURONTIN) 100 mg capsule Take 1 capsule (100 mg total) by mouth 3 (three) times a day Melvi Lorenzo MD Reordered   Ordered as: gabapentin (NEURONTIN) capsule 100 mg - 100 mg, Oral, 3 times daily, First dose on Tue 11/13/18 at 0900 [employer]    ibuprofen (MOTRIN) 400 mg tablet Take 1 tablet (400 mg total) by mouth every 6 (six) hours as needed for mild pain Melvi Lorenzo MD Reordered   Ordered as: ibuprofen (MOTRIN) tablet 400 mg - 400 mg, Oral, Every 6 hours PRN, mild pain, Starting Tue 11/13/18 at 3230 [employer]    [facility]ine [facility]cg tablet [facility] Mel[facility] MD Reordered   Ordered as: levothyroxine tablet 25 mcg - 25 mcg, Oral, Daily, First dose on Tue 11/13/18 at 0900 Administer in the morning on an empty stomach, at least 30 to 60 minutes before food  Tablets may be crushed and suspended in 5-10mls of water for administration  [employer]  Multiple Vitamins-Minerals (CENTRUM SILVER PO) Take 1 tablet by mouth daily Melvi Lorenzo MD Reordered   Ordered as: multivitamin-minerals (CENTRUM) tablet 1 tablet - 1 tablet, Oral, Daily, First dose on Tue 11/13/18 at 0900 **DISPOSE IN 8 [employer]**    nortriptyline (PAMELOR) 10 mg capsule TAKE ONE CAPSULE BY MOUTH AT BEDTIME Melvi Lorenzo MD Not Ordered   nortriptyline (PAMELOR) 25 mg capsule TAKE ONE CAPSULE BY MOUTH AT BEDTIME Melvi Lorenzo MD Reordered   Ordered as: nortriptyline (PAMELOR) capsule 25 mg - 25 mg, Oral, Daily at bedtime, First dose on Tue 11/13/18 at 2200   predniSONE 10 mg tablet Take 10 mg by mouth daily Melvi Lorenzo MD Reordered   Ordered as: predniSONE tablet 10 mg - 10 mg, Oral, Daily, First dose on Tue 11/13/18 at 0900 Administer with food or milk   [employer]    Psyllium (METAMUCIL FIBER PO) Take 1 packet by mouth 3 (three) times a day   Melvi Lorenzo MD Reordered   Ordered as: psyllium (METAMUCIL) 1 packet - 1 packet, Oral, 3 times daily, First dose on Tue 11/13/18 at 0900 Drink at least 8 ounces of liquid with each dose  Vitamin E 400 units TABS Take 400 Units by mouth 2 (two) times a day Cas Santos MD Reordered   Ordered as: vitamin E (tocopherol) capsule 400 Units - 400 Units, Oral, Daily, First dose on Tue 11/13/18 at 0900       Allergies: No Known Allergies  History:  Marital Status:    Occupation:  Retired  Patient Pre-hospital Living Situation:  Lives at home  Patient Pre-hospital Level of Mobility:  Patient states that she is ambulatory although needs assistance and walker  Patient Pre-hospital Diet Restrictions:  Regular  Substance Use History:   History   Alcohol Use No     History   Smoking Status    Former Smoker   Smokeless Tobacco    Never Used     Comment: quit 20-30 years ago; NEVER A SMOKER AS PER ALL SCRIPTS      History   Drug Use No       Family History:  Family History   Problem Relation Age of Onset    Rheum arthritis Mother     Rheum arthritis Sister     Prostate cancer Brother        Physical Exam:     Vitals:   Blood Pressure: 167/81 (11/12/18 2340)  Pulse: 81 (11/12/18 2340)  Temperature: 98 °F (36 7 °C) (11/12/18 2340)  Temp Source: Tympanic (11/12/18 2340)  Respirations: 16 (11/12/18 2340)  Weight - Scale: 47 4 kg (104 lb 8 oz) (11/12/18 2340)  SpO2: 97 % (11/12/18 2340)    Constitutional:  Well developed, well nourished, no acute distress, non-toxic appearance   Eyes:  PERRL, conjunctiva normal   HENT:  Atraumatic, external ears normal, nose normal, oropharynx moist, no pharyngeal exudates   Neck- normal range of motion, no tenderness, supple   Respiratory:  No respiratory distress, normal breath sounds, no rales, no wheezing   Cardiovascular:  Normal rate, normal rhythm, no murmurs, no gallops, no rubs   GI:  Soft, nondistended, normal bowel sounds, nontender, no organomegaly, no mass, no rebound, no guarding   :  No costovertebral angle tenderness   Musculoskeletal: Right hip is in word turned, she says that this is her baseline  Pain is located at the middle of the lumbar area with paraspinal tenderness and radiation towards the right hip and right buttock towards the right knee  Integument:  Well hydrated, no rash   Lymphatic:  No lymphadenopathy noted   Neurologic:  Alert &awake, communicative, CN 2-12 normal, no preferential mood  Psychiatric:  Speech and behavior appropriate       Lab Results: I have personally reviewed pertinent reports  Results from last 7 days  Lab Units 11/13/18  0152   WBC Thousand/uL 8 15   HEMOGLOBIN g/dL 11 4*   HEMATOCRIT % 36 3   PLATELETS Thousands/uL 364   NEUTROS PCT % 71   LYMPHS PCT % 18   MONOS PCT % 9   EOS PCT % 1       Results from last 7 days  Lab Units 11/13/18  0152   POTASSIUM mmol/L 3 5   CHLORIDE mmol/L 104   CO2 mmol/L 28   BUN mg/dL 22   CREATININE mg/dL 0 62   CALCIUM mg/dL 9 0             Imaging: I have personally reviewed pertinent reports  Xr Lumbar Spine 2 Or 3 Views    Result Date: 11/13/2018  Narrative: LUMBAR SPINE INDICATION:   back pain, B hip pain  COMPARISON:  Radiographs lumbar spine dated 8/31/2014 VIEWS:  XR SPINE LUMBAR 2 OR 3 VIEWS INJURY FINDINGS: There is a compression fracture of L3 with approximately 25-33% loss of height  Mild retropulsion  Spinal alignment is maintained  Vertebral body heights are otherwise grossly maintained  The intervertebral disc spaces appear preserved  Suspected IUD is redemonstrated in the pelvis  Status post left hip replacement, partially visualized  Impression: Compression fracture of L3 as described, age indeterminant but new from the 2014 exam   Correlation with the patient's symptoms recommended  Mild retropulsion but no high-grade spinal canal narrowing is seen at this level  Spinal alignment is maintained  Visualized bones otherwise appear intact  Other findings as above   Workstation performed: [id number]     Xr Sacrum And Coccyx    Result Date: 11/13/2018  Narrative: PELVIS, SACRUM AND COCCYX INDICATION: Fall, lower back pain  COMPARISON:  Radiographs lumbar spine same day VIEWS:  XR SACRUM AND COCCYX, XR PELVIS AP ONLY 1 OR 2 VW  FINDINGS: Compression fracture of L3, with approximately 25-33% loss of height; age indeterminate, better seen on the dedicated lumbar spine views  Visualized bones otherwise appear intact  Bipolar right hip hemiarthroplasty is seen  Hardware appears intact  Left total hip arthroplasty is noted, appears intact  Sacral arcuate lines are maintained  The SI joints appear symmetric  Pubic symphysis is maintained  Radiopaque intrauterine device redemonstrated  Some midline lower abdominal and pelvic sutures are noted  Impression: Compression fracture of L3, with approximately 25-33% loss of height; age indeterminate, better seen on the dedicated lumbar spine views  Visualized bones otherwise appear intact  Other findings as above  Workstation performed: [id number]     Xr Pelvis Ap Only 1 Or 2 Vw    Result Date: 11/13/2018  Narrative: PELVIS, SACRUM AND COCCYX INDICATION: Fall, lower back pain  COMPARISON:  Radiographs lumbar spine same day VIEWS:  XR SACRUM AND COCCYX, XR PELVIS AP ONLY 1 OR 2 VW  FINDINGS: Compression fracture of L3, with approximately 25-33% loss of height; age indeterminate, better seen on the dedicated lumbar spine views  Visualized bones otherwise appear intact  Bipolar right hip hemiarthroplasty is seen  Hardware appears intact  Left total hip arthroplasty is noted, appears intact  Sacral arcuate lines are maintained  The SI joints appear symmetric  Pubic symphysis is maintained  Radiopaque intrauterine device redemonstrated  Some midline lower abdominal and pelvic sutures are noted  Impression: Compression fracture of L3, with approximately 25-33% loss of height; age indeterminate, better seen on the dedicated lumbar spine views  Visualized bones otherwise appear intact   Other findings as above  Workstation performed: [id number]         ** Please Note: Dragon 360 Dictation voice to text software was used in the creation of this document   **

## 2018-11-13 NOTE — ED ATTENDING ATTESTATION
Renee Restrepo DO, saw and evaluated the patient  I have discussed the patient with the resident/non-physician practitioner and agree with the resident's/non-physician practitioner's findings, Plan of Care, and MDM as documented in the resident's/non-physician practitioner's note, except where noted  All available labs and Radiology studies were reviewed  At this point I agree with the current assessment done in the Emergency Department  I have conducted an independent evaluation of this patient a history and physical is as follows:    76 yo female c/o low back pain, B hip pain with radiation down R leg  Denies focal weakness/numbness/tingling  Has been ongoing for a couple weeks, worse 2 days ago after she fell out of bed  No other injury noted  Pain severe, constant, worse with movement  Tried tylenol, oxycodone at home without improvement  Has appt with dr Karen Zhao from orthopedics tomorrow, but came tonight because she was unable to tolerate the pain  Denies focal weakness/numbness/tingling, urinary sxs, saddle anesthesia  Of note pt has been on prednisone for 2 months, although this is unlikely to have resulted in insufficiency fx  EHL 5/5 B  Equal sensation over S2 B  Imp: back pain, B hip pain  Plan: Lspine films, pelvic film, sacrum films, analgesia  Reassess  May require admission for amb dysfunction for PT eval due to severe pain          Critical Care Time  CritCare Time    Procedures

## 2018-11-13 NOTE — ED PROVIDER NOTES
History  Chief Complaint   Patient presents with    Hip Pain     Pt with bilateral hip replacements c/o right hip pain for several days  Pt has appointment tomorrow but could not wait because of the pain  Pt denies injury to area  HPI  Patient is a 65-year-old female past medical history osteoporosis, bilateral hip arthroplasty presenting with lower back and hip pain for the past 3 weeks  Patient states that 3 weeks ago she was moving a mattress when she twisted and injured her lower back  Patient presented for evaluation emergency department 2 weeks ago complaining of lower back pain, responded to Toradol, Lidoderm patch, acetaminophen, able to ambulate and sent home  Patient states that over the past 2 weeks this pain has gradually worsened and states that for the past 2 days it has been so severe that she is unable to ambulate  Pain is a 10/10 located in the sacral region with radiation down the right leg to the foot  Patient states that she feels like her right leg will no longer support her but states that the hip pain is equal bilaterally  Patient lives alone and states that she has had her sister assisting her at home for the past 2 days  Patient states that she fell out of bed 2 days ago and feels that that further exacerbated the pain  Patient feels that her strength of her lower extremities is limited by pain, patient denies lower extremity paresthesias or anesthesias, saddle anesthesia, urinary or bowel incontinence, urinary retention  Patient states that she had appointment to see her orthopedic surgeon tomorrow but felt that the pain was so severe that she was no longer able to weight  Prior to Admission Medications   Prescriptions Last Dose Informant Patient Reported? Taking?    Ascorbic Acid (VITAMIN C) 1000 MG tablet 11/12/2018 at Unknown time Self Yes Yes   Sig: Take 1,000 mg by mouth daily   Multiple Vitamins-Minerals (CENTRUM SILVER PO) 11/12/2018 at Unknown time Self Yes Yes   Sig: Take 1 tablet by mouth daily   Psyllium (METAMUCIL FIBER PO) 11/12/2018 at Unknown time Self Yes Yes   Sig: Take 1 packet by mouth 3 (three) times a day     Vitamin E 400 units TABS 11/12/2018 at Unknown time Self Yes Yes   Sig: Take 400 Units by mouth 2 (two) times a day   alendronate (FOSAMAX) 70 mg tablet 11/12/2018 at Unknown time  No Yes   Sig: TAKE 1 TABLET ONCE WEEKLY   cholecalciferol (VITAMIN D3) 1,000 units tablet 11/12/2018 at Unknown time Self Yes Yes   Sig: Take 1,000 Units by mouth daily   diazepam (VALIUM) 5 mg tablet 11/12/2018 at Unknown time  No Yes   Sig: TAKE ONE TABLET BY MOUTH EVERY 12 HOURS AS NEEDED FOR ANXIETY     docusate sodium (COLACE) 100 mg capsule 11/12/2018 at Unknown time Self No Yes   Sig: Take 1 capsule by mouth 2 (two) times a day   folic acid (FOLVITE) 1 mg tablet 11/12/2018 at Unknown time  No Yes   Sig: TAKE ONE TABLET BY MOUTH EVERY DAY   gabapentin (NEURONTIN) 100 mg capsule 11/12/2018 at Unknown time Self No Yes   Sig: Take 1 capsule (100 mg total) by mouth 3 (three) times a day   ibuprofen (MOTRIN) 400 mg tablet 11/12/2018 at Unknown time  No Yes   Sig: Take 1 tablet (400 mg total) by mouth every 6 (six) hours as needed for mild pain   levothyroxine 25 mcg tablet 11/12/2018 at Unknown time Self No Yes   Sig: TAKE ONE TABLET BY MOUTH EVERY DAY   nortriptyline (PAMELOR) 10 mg capsule 11/12/2018 at Unknown time Self No Yes   Sig: TAKE ONE CAPSULE BY MOUTH AT BEDTIME   nortriptyline (PAMELOR) 25 mg capsule 11/12/2018 at Unknown time Self No Yes   Sig: TAKE ONE CAPSULE BY MOUTH AT BEDTIME   predniSONE 10 mg tablet 11/12/2018 at Unknown time  Yes Yes   Sig: Take 10 mg by mouth daily      Facility-Administered Medications: None       Past Medical History:   Diagnosis Date    Anxiety     Depression     Disease of thyroid gland     HYPO    Femur neck fracture (HCC)     GERD (gastroesophageal reflux disease)     Hyperthyroidism     RESOLVED: [id number]    Osteoporosis     Polio  PVD (peripheral vascular disease) ([facility] [address] )     Right BBB/left ant fasc block     UTI (urinary tract infection)     Varicella infection     LAST ASSESSED: [id number]       Past Surgical History:   Procedure Laterality Date    APPENDECTOMY      HIP ARTHROPLASTY Left 11/27/2017    Procedure: REMOVAL IM NAIL CONVERSION TO TOTAL HIP ARTHROPLASTY POSTERIOR APPROACH;  Surgeon: Fatou Campbell MD;  [facility];  Service: Orthopedics    HIP FRACTURE SURGERY      HIP SURGERY      both hips replaced;2013 left ,2014 right    JOINT REPLACEMENT Right     HIP TOTAL    NV CONV PREV HIP SURG TO TOT HIP Iman[facility] Basket Left 10/4/2017    Procedure: [place] removal of left hip;  Surgeon: Fatou Campbell MD;  Location: [facility];  Service: Orthopedics    REVISION TOTAL HIP ARTHROPLASTY Right     TONSILLECTOMY         Family History   Problem Relation Age of Onset    Rheum arthritis Mother     Rheum arthritis Sister     Prostate cancer Brother      I have reviewed and agree with the history as documented  Social History   Substance Use Topics    Smoking status: Former Smoker    Smokeless tobacco: Never Used      Comment: quit 20-30 years ago; NEVER A SMOKER AS PER ALL SCRIPTS     Alcohol use No        Review of Systems   Constitutional: Negative for chills, fatigue and fever  HENT: Negative for hearing loss  Eyes: Negative for visual disturbance  Respiratory: Negative for cough, chest tightness and shortness of breath  Cardiovascular: Negative for chest pain  Gastrointestinal: Negative for abdominal distention, abdominal pain, constipation, diarrhea, nausea and vomiting  Endocrine: Negative for polydipsia and polyuria  Genitourinary: Negative for dysuria and hematuria  Musculoskeletal: Positive for back pain and gait problem  Skin: Negative for color change and rash  Neurological: Negative for dizziness, syncope and headaches         Physical Exam  ED Triage Vitals   Temperature Pulse Respirations Blood Pressure SpO2   11/12/18 2340 11/12/18 2340 11/12/18 2340 11/12/18 2340 11/12/18 2340   98 °F (36 7 °C) 81 16 167/81 97 %      Temp Source Heart Rate Source Patient Position - Orthostatic VS BP Location FiO2 (%)   11/12/18 2340 11/13/18 0517 11/13/18 0915 11/13/18 0517 --   Tympanic Monitor Sitting Right arm       Pain Score       11/13/18 0054       Worst Possible Pain           Orthostatic Vital Signs  Vitals:    11/12/18 2340 11/13/18 0517 11/13/18 0915   BP: 167/81 134/75 143/78   Pulse: 81 79 74   Patient Position - Orthostatic VS:   Sitting       Physical Exam   Constitutional: She is oriented to person, place, and time  She appears well-developed and well-nourished  No distress  HENT:   Head: Normocephalic and atraumatic  Right Ear: External ear normal    Left Ear: External ear normal    Nose: Nose normal    Mouth/Throat: Oropharynx is clear and moist  No oropharyngeal exudate  Eyes: Pupils are equal, round, and reactive to light  Neck: Normal range of motion  Cardiovascular: Normal rate, regular rhythm and normal heart sounds  Exam reveals no gallop and no friction rub  No murmur heard  Pulmonary/Chest: Effort normal and breath sounds normal  No respiratory distress  She has no wheezes  She exhibits no tenderness  Abdominal: Soft  Bowel sounds are normal  She exhibits no distension and no mass  There is no tenderness  There is no guarding  Musculoskeletal: Normal range of motion  She exhibits tenderness (sacral spine, hip)  She exhibits no edema or deformity  Patient unable to sit up and lean forward secondary to lower back pain    Lymphadenopathy:     She has no cervical adenopathy  Neurological: She is alert and oriented to person, place, and time  Skin: Skin is warm and dry  Capillary refill takes less than 2 seconds  She is not diaphoretic  Psychiatric: She has a normal mood and affect  Nursing note and vitals reviewed        ED Medications  Medications ascorbic acid (VITAMIN C) tablet 1,000 mg (1,000 mg Oral Given 11/13/18 0859)   cholecalciferol (VITAMIN D3) tablet 1,000 Units (1,000 Units Oral Given 11/13/18 0859)   diazepam (VALIUM) tablet 5 mg (not administered)   docusate sodium (COLACE) capsule 100 mg (100 mg Oral Not Given 66/78/33 8420)   folic acid (FOLVITE) tablet 1,000 mcg (1,000 mcg Oral Given 11/13/18 0858)   gabapentin (NEURONTIN) capsule 100 mg (100 mg Oral Given 11/13/18 0900)   ibuprofen (MOTRIN) tablet 400 mg (400 mg Oral Given 11/13/18 1223)   levothyroxine tablet 25 mcg (25 mcg Oral Given 11/13/18 0534)   multivitamin-minerals (CENTRUM) tablet 1 tablet (1 tablet Oral Given 11/13/18 0859)   nortriptyline (PAMELOR) capsule 25 mg (not administered)   predniSONE tablet 10 mg (10 mg Oral Given 11/13/18 0858)   psyllium (METAMUCIL) 1 packet (1 packet Oral Not Given 11/13/18 0917)   vitamin E (tocopherol) capsule 400 Units (400 Units Oral Given 11/13/18 0859)   docusate sodium (COLACE) capsule 100 mg (100 mg Oral Given 11/13/18 0858)   ondansetron (ZOFRAN) injection 4 mg (not administered)   aluminum-magnesium hydroxide-simethicone (MYLANTA) 200-200-20 mg/5 mL oral suspension 30 mL (not administered)   enoxaparin (LOVENOX) subcutaneous injection 40 mg (40 mg Subcutaneous Given 11/13/18 0905)   oxyCODONE (ROXICODONE) IR tablet 2 5 mg (2 5 mg Oral Given 11/13/18 0534)   HYDROmorphone (DILAUDID) injection 0 25 mg (0 25 mg Intravenous Given 11/13/18 0902)   lidocaine (LIDODERM) 5 % patch 2 patch (2 patches Topical Medication Applied 11/13/18 0916)   ketorolac (TORADOL) injection 30 mg (30 mg Intravenous Given 11/13/18 0054)   lidocaine (LIDODERM) 5 % patch 2 patch (2 patches Topical Patch Removed 11/13/18 1215)   HYDROmorphone (DILAUDID) injection 0 5 mg (0 5 mg Intravenous Given 11/13/18 0150)       Diagnostic Studies  Results Reviewed     Procedure Component Value Units Date/Time    Comprehensive metabolic panel [[id number]]  (Abnormal) Collected: 11/13/18 0152    Lab Status:  Final result Specimen:  Blood from Arm, Right Updated:  11/13/18 1022     Sodium 137 mmol/L      Potassium 3 5 mmol/L      Chloride 103 mmol/L      CO2 26 mmol/L      ANION GAP 8 mmol/L      BUN 23 mg/dL      Creatinine 0 60 mg/dL      Glucose 78 mg/dL      Calcium 8 4 mg/dL      AST 15 U/L      ALT 23 U/L      Alkaline Phosphatase 99 U/L      Total Protein 6 7 g/dL      Albumin 3 2 (L) g/dL      Total Bilirubin 0 31 mg/dL      eGFR 90 ml/min/1 73sq m     Narrative:         National Kidney Disease Education Program recommendations are as follows:  GFR calculation is accurate only with a steady state creatinine  Chronic Kidney disease less than 60 ml/min/1 73 sq  meters  Kidney failure less than 15 ml/min/1 73 sq  meters  TSH, 3rd generation [[id number]]  (Abnormal) Collected:  11/13/18 0152    Lab Status:  Final result Specimen:  Blood from Arm, Right Updated:  11/13/18 1022     TSH 3RD GENERATON 9 580 (H) uIU/mL     Narrative:         Patients undergoing fluorescein dye angiography may retain small amounts of fluorescein in the body for 48-72 hours post procedure  Samples containing fluorescein can produce falsely depressed TSH values  If the patient had this procedure,a specimen should be resubmitted post fluorescein clearance            The recommended reference ranges for TSH during pregnancy are as follows:  First trimester 0 1 to 2 5 uIU/mL  Second trimester  0 2 to 3 0 uIU/mL  Third trimester 0 3 to 3 0 uIU/m      PTH, intact [[id number]]  (Normal) Collected:  11/13/18 0152    Lab Status:  Final result Specimen:  Blood from Arm, Right Updated:  11/13/18 1022     PTH 54 5 pg/mL     Sedimentation rate, automated [[id number]]  (Normal) Collected:  11/13/18 0719    Lab Status:  Final result Specimen:  Blood Updated:  11/13/18 0826     Sed Rate 13 mm/hour     Vitamin D 25 hydroxy [[id number]]     Lab Status:  No result Specimen:  Blood     Basic metabolic panel [[id number]] Collected: 11/13/18 0152    Lab Status:  Final result Specimen:  Blood from Arm, Right Updated:  11/13/18 0214     Sodium 138 mmol/L      Potassium 3 5 mmol/L      Chloride 104 mmol/L      CO2 28 mmol/L      ANION GAP 6 mmol/L      BUN 22 mg/dL      Creatinine 0 62 mg/dL      Glucose 83 mg/dL      Calcium 9 0 mg/dL      eGFR 89 ml/min/1 73sq m     Narrative:         National Kidney Disease Education Program recommendations are as follows:  GFR calculation is accurate only with a steady state creatinine  Chronic Kidney disease less than 60 ml/min/1 73 sq  meters  Kidney failure less than 15 ml/min/1 73 sq  meters  CBC and differential [[id number]]  (Abnormal) Collected:  11/13/18 0152    Lab Status:  Final result Specimen:  Blood from Arm, Right Updated:  11/13/18 0200     WBC 8 15 Thousand/uL      RBC 3 98 Million/uL      Hemoglobin 11 4 (L) g/dL      Hematocrit 36 3 %      MCV 91 fL      MCH 28 6 pg      MCHC 31 4 g/dL      RDW 14 8 %      MPV 8 8 (L) fL      Platelets 593 Thousands/uL      nRBC 0 /100 WBCs      Neutrophils Relative 71 %      Immat GRANS % 1 %      Lymphocytes Relative 18 %      Monocytes Relative 9 %      Eosinophils Relative 1 %      Basophils Relative 0 %      Neutrophils Absolute 5 80 Thousands/µL      Immature Grans Absolute 0 05 Thousand/uL      Lymphocytes Absolute 1 48 Thousands/µL      Monocytes Absolute 0 75 Thousand/µL      Eosinophils Absolute 0 06 Thousand/µL      Basophils Absolute 0 01 Thousands/µL                  XR sacrum and coccyx   Final Result by Marcela Olszewski, DO (11/13 0157)      Compression fracture of L3, with approximately 25-33% loss of height; age indeterminate, better seen on the dedicated lumbar spine views  Visualized bones otherwise appear intact  Other findings as above           Workstation performed: [id number]         XR pelvis ap only 1 or 2 vw   Final Result by Marcela Olszewski, DO (11/13 0157)      Compression fracture of L3, with approximately 25-33% loss of height; age indeterminate, better seen on the dedicated lumbar spine views  Visualized bones otherwise appear intact  Other findings as above  Workstation performed: [id number]         XR lumbar spine 2 or 3 views   Final Result by Xiomara Sheppard DO (11/13 0147)      Compression fracture of L3 as described, age indeterminant but new from the 2014 exam   Correlation with the patient's symptoms recommended  Mild retropulsion but no high-grade spinal canal narrowing is seen at this level  Spinal alignment is    maintained  Visualized bones otherwise appear intact  Other findings as above  Workstation performed: [id number]         XR knee 4+ vw right injury    (Results Pending)         Procedures  Procedures      Phone Consults  ED Phone Contact    ED Course                               MDM  Number of Diagnoses or Management Options  Diagnosis management comments: Patient is 57-year-old female past medical history osteoporosis, chronic steroid use, bilateral hip arthroplasty presenting with lower back pain and ambulatory dysfunction for past 3 weeks with acute worsening over past 2 days  Patient's pain is localized more to the lumbosacral region than the hips, however he patient is having pain in the right hip as well  Given patient's age, osteoporosis, chronic steroid use, concern for compression fracture, fracture into SI joint  Given patient's bilateral arthroplasty and lack of a significant fall, hip fracture is less likely  Plain films of lumbar spine, sacral spine, pelvis performed  Initial pain control with Toradol Lidoderm patch was unsuccessful  Patient additionally given Dilaudid  Radiology demonstrating age indeterminate compression fracture of lumbar spine  Given patient's refractory pain, ambulatory dysfunction, admitted to internal medicine service, will need pain control and physical therapy          Amount and/or Complexity of Data Reviewed  Clinical lab tests: ordered and reviewed  Tests in the radiology section of CPT®: ordered and reviewed  Tests in the medicine section of CPT®: ordered and reviewed  Review and summarize past medical records: yes  Discuss the patient with other providers: yes  Independent visualization of images, tracings, or specimens: yes    Risk of Complications, Morbidity, and/or Mortality  Presenting problems: moderate  Diagnostic procedures: low  Management options: minimal    Patient Progress  Patient progress: stable    CritCare Time    Disposition  Final diagnoses:   Arthritis of left hip   Compression fracture of L3 lumbar vertebra, closed, initial encounter ([facility] )     Time reflects when diagnosis was documented in both MDM as applicable and the Disposition within this note     Time User Action Codes Description Comment    11/13/2018  3:24 AM Luisa Graham Add [M16 12] Arthritis of left hip     11/13/2018  3:24 AM Luisa Graham Modify [N94 76] Arthritis of left hip     11/13/2018  3:24 AM Luisa Graham Modify [Y98 09] Arthritis of left hip     11/13/2018  7:08 AM Jericho Cedeno Add [S32 030A] Compression fracture of L3 lumbar vertebra, closed, initial encounter ([facility] )     11/13/2018  7:08 AM Francine Hairston Modify [S32 030A] Compression fracture of L3 lumbar vertebra, closed, initial encounter [facility])       ED Disposition     None      Follow-up Information    None         Patient's Medications   Discharge Prescriptions    No medications on file     No discharge procedures on file  ED Provider  Attending physically available and evaluated Geetha Rivera I managed the patient along with the ED Attending      Electronically Signed by         Maryanne Sinclair MD  11/13/18 1164

## 2018-11-13 NOTE — UTILIZATION REVIEW
Initial Clinical Review    Admission: Date/Time/Statement: 11/13/18 @ 0309     Orders Placed This Encounter   Procedures    Inpatient Admission     Standing Status:   Standing     Number of Occurrences:   1     Order Specific Question:   Admitting Physician     Answer:   Ziyad Palacios [[id number]]     Order Specific Question:   Level of Care     Answer:   Level 2 Stepdown / HOT [14]     Order Specific Question:   Estimated length of stay     Answer:   More than 2 Midnights     Order Specific Question:   Certification     Answer:   I certify that inpatient services are medically necessary for this patient for a duration of greater than two midnights  See H&P and MD Progress Notes for additional information about the patient's course of treatment  ED: Date/Time/Mode of Arrival:   ED Arrival Information     Expected Arrival Acuity Means of Arrival Escorted By Service Admission Type    - 11/12/2018 23:36 Urgent Wheelchair Family Member General Medicine Urgent    Arrival Complaint    Hip Pain        Chief Complaint:   Chief Complaint   Patient presents with    Hip Pain     Pt with bilateral hip replacements c/o right hip pain for several days  Pt has appointment tomorrow but could not wait because of the pain  Pt denies injury to area  History of Illness: Taylor Funez is a 75 y o  female who has a past medical history significant for osteoporosis hypothyroidism, hypertension, with status post arthroplasty as well as ambulatory dysfunction  Patient sees Orthopedics for follow-up of arthroplasty in ambulatory dysfunction  Noted is that patient was here earlier part of this month because after moving mattress, the patient experience increasing back pain which radiates more to the year right hip as well as right buttock towards the right knee  With that, the patient is supposed to see her orthopedic surgeon [facility]) for an outpatient follow-up and to discuss further plans    However, the patient has increasing pain that she could no longer stand it and that said the patient went to the emergency room of [facility] instead  Patient denies any incontinence  Noted that initially after the first ER visit, the patient was able to ambulate with supportive treatment including Toradol, Lidoderm and acetaminophen  However over the past 2 weeks or so there is increasing pain and hence more ambulatory dysfunction and hence the ER visit  ED Vital Signs:   ED Triage Vitals   Temperature Pulse Respirations Blood Pressure SpO2   11/12/18 2340 11/12/18 2340 11/12/18 2340 11/12/18 2340 11/12/18 2340   98 °F (36 7 °C) 81 16 167/81 97 %      Temp Source Heart Rate Source Patient Position - Orthostatic VS BP Location FiO2 (%)   11/12/18 2340 11/13/18 0517 11/13/18 0915 11/13/18 0517 --   Tympanic Monitor Sitting Right arm       Pain Score       11/13/18 0054       Worst Possible Pain        Wt Readings from Last 1 Encounters:   11/12/18 47 4 kg (104 lb 8 oz)     Abnormal Labs:    ALB 3 2  HGB 11 4  TSH 9 580    Diagnostic Test Results:     11/13 Xray pelvis, sacrum, coccyx - Compression fracture of L3, with approximately 25-33% loss of height; age indeterminate, better seen on the dedicated lumbar spine views  Visualized bones otherwise appear intact      11/13 Xray Lumbar spine - Compression fracture of L3 as described, age indeterminant but new from the 2014 exam   Correlation with the patient's symptoms recommended  Mild retropulsion but no high-grade spinal canal narrowing is seen at this level  Spinal alignment is maintained  Visualized bones otherwise appear intact      11/13 Xray R knee - results pending     ED Treatment:   Medication Administration from 11/12/2018 2336 to 11/13/2018 1534    Date/Time Order Dose Route Action   11/13/2018 0054 ketorolac (TORADOL) injection 30 mg 30 mg Intravenous Given   11/13/2018 1215 lidocaine (LIDODERM) 5 % patch 2 patch 2 patch Topical Patch Removed   11/13/2018 0053 lidocaine (LIDODERM) 5 % patch 2 patch 2 patch Topical Medication Applied   11/13/2018 0150 HYDROmorphone (DILAUDID) injection 0 5 mg 0 5 mg Intravenous Given   11/13/2018 0859 ascorbic acid (VITAMIN C) tablet 1,000 mg 1,000 mg Oral Given   11/13/2018 0859 cholecalciferol (VITAMIN D3) tablet 1,000 Units 1,000 Units Oral Given   82/78/6439 1356 folic acid (FOLVITE) tablet 1,000 mcg 1,000 mcg Oral Given   11/13/2018 0900 gabapentin (NEURONTIN) capsule 100 mg 100 mg Oral Given   11/13/2018 1223 ibuprofen (MOTRIN) tablet 400 mg 400 mg Oral Given   11/13/2018 0534 ibuprofen (MOTRIN) tablet 400 mg 400 mg Oral Given   11/13/2018 0534 levothyroxine tablet 25 mcg 25 mcg Oral Given   11/13/2018 0859 multivitamin-minerals (CENTRUM) tablet 1 tablet 1 tablet Oral Given   11/13/2018 0858 predniSONE tablet 10 mg 10 mg Oral Given   11/13/2018 0859 vitamin E (tocopherol) capsule 400 Units 400 Units Oral Given   11/13/2018 0858 docusate sodium (COLACE) capsule 100 mg 100 mg Oral Given   11/13/2018 0905 enoxaparin (LOVENOX) subcutaneous injection 40 mg 40 mg Subcutaneous Given   11/13/2018 1357 oxyCODONE (ROXICODONE) IR tablet 2 5 mg 2 5 mg Oral Given   11/13/2018 0534 oxyCODONE (ROXICODONE) IR tablet 2 5 mg 2 5 mg Oral Given   11/13/2018 1454 HYDROmorphone (DILAUDID) injection 0 25 mg 0 25 mg Intravenous Given   11/13/2018 0902 HYDROmorphone (DILAUDID) injection 0 25 mg 0 25 mg Intravenous Given   11/13/2018 0916 lidocaine (LIDODERM) 5 % patch 2 patch 2 patch Topical Medication Applied        Past Medical/Surgical History:    Active Ambulatory Problems     Diagnosis Date Noted    Dizziness 08/30/2017    Anxiety 08/30/2017    Peripheral vascular disease ([facility] [address] ) 08/30/2017    Epigastric discomfort 08/30/2017    RLS (restless legs syndrome) 08/30/2017    Arthritis of left hip 10/04/2017    S/P total hip arthroplasty 11/27/2017    Closed fracture of femur, neck (HCC) 11/18/2014    Elevated alkaline phosphatase level 05/16/2017    Hair loss 02/28/2017    Hypothyroidism 12/13/2016    Left hip pain 03/01/2017    Osteoporosis 04/18/2017    Primary localized osteoarthritis of left hip 05/25/2017    RBBB 08/23/2017    Hand arthritis 05/16/2018    Screening for colon cancer 05/16/2018    Carpal tunnel syndrome on right      Resolved Ambulatory Problems     Diagnosis Date Noted    Urinary tract infection 08/30/2017    GERD (gastroesophageal reflux disease) 08/30/2017     Past Medical History:   Diagnosis Date    Anxiety     Depression     Disease of thyroid gland     Femur neck fracture (HCC)     GERD (gastroesophageal reflux disease)     Hyperthyroidism     Osteoporosis     Polio     PVD [facility]peripheral vascular disease) ([facility] [address] )     Right BBB/left ant fasc block     UTI (urinary tract infection)     Varicella infection      Admitting Diagnosis: Hip pain [M25 [id number]]    Age/Sex: 75 y o  female    Assessment/Plan:   * Ambulatory dysfunction   Assessment & Plan     Ambulatory dysfunction is multifactorial from the problems of the hips status post arthroplasty as well as the discovered L3 compression fracture  Patient has appointment with Dr Julio Cesar Hodges however increased pain prompted patient to come to the emergency room  Will start geriatric pain protocol with heat compresses as per protocol; lidocaine patches to continue  Orthopedics consult  Occupational therapy consult       Compression fracture of L3 lumbar vertebra, closed, initial encounter [facility])   Assessment & Plan     Please see plans regarding ambulatory dysfunction  Lidocaine patch       S/P total hip arthroplasty   Assessment & Plan     Patient would see orthopedics (Jerzy)        Hypothyroidism   Assessment & Plan     Continue thyroid medication           VTE Prophylaxis: Enoxaparin (Lovenox)  / sequential compression device   Code Status: Prior full Code as discussed with patient  Anticipated Length of Stay:  Patient will be admitted on an Inpatient basis with an anticipated length of stay of  greater than 2 midnights  Justification for Hospital Stay: Please see detailed plans noted above      Admission Orders:  Scheduled Meds:   Current Facility-Administered Medications:  aluminum-magnesium hydroxide-simethicone 30 mL Oral Q6H PRN   vitamin C 1,000 mg Oral Daily   cholecalciferol 1,000 Units Oral Daily   diazepam 5 mg Oral Q12H PRN   docusate sodium 100 mg Oral BID   docusate sodium 100 mg Oral BID PRN   enoxaparin 40 mg Subcutaneous Daily   folic acid 1,259 mcg Oral Daily   gabapentin 100 mg Oral TID   HYDROmorphone 0 25 mg Intravenous Q4H PRN   ibuprofen 400 mg Oral Q6H PRN   levothyroxine 25 mcg Oral Early Morning   lidocaine 2 patch Topical Daily   multivitamin-minerals 1 tablet Oral Daily   nortriptyline 25 mg Oral HS   ondansetron 4 mg Intravenous Q6H PRN   oxyCODONE 2 5 mg Oral Q4H PRN   predniSONE 10 mg Oral Daily   psyllium 1 packet Oral TID   vitamin E (tocopherol) 400 Units Oral Daily     PRN Meds:   aluminum-magnesium hydroxide-simethicone    diazepam    docusate sodium x1    HYDROmorphone x2    Ibuprofen  x2    ondansetron    oxyCODONE x2    SCDs  Spine brace  Daily wt  Cold application   Up w/assist   Cardiac diet   Cons Neurosurgery   Cons Endocrinology   Cons Ortho   PT/OT eval/tx    ___________________  11/13 Ortho Consult - L3 compression fx - not assoc with TH Arthroplasty  11/13 Neurosurgery Consult - no surgical intervention at this time

## 2018-11-13 NOTE — DISCHARGE INSTRUCTIONS
Discharge Instructions - [address] Colon 75 y o  female MRN: [id number]  Unit/Bed#: X ray    Weight Bearing Status:                                           Weight bearing as tolerated bilateral lower extremities    Pain:  Continue analgesics as directed     PT/OT:  Continue PT/OT on outpatient basis as directed    Appt Instructions: If you do not have your appointment, please call the clinic at [phone] to f/u with Dr Gisella Doran as desired    Contact the office sooner if you experience any increased numbness/tingling in the extremities  Miscellaneous:  None    Discharge Instructions - Neurosurgery    · LSO brace to be worn when out of bed of head of bed greater than 45 degrees  May be removed for showering  · No heavy lifting  · No strenuous activities  · No Driving  **Please notify MD immediately if you have increased back or leg pain  New numbness, tingling and/or weakness in your legs  **    Please follow up in 2 weeks with the Neurosurgical group with repeat X-ray of lumbar spine 2-3 days prior to your appointment  You may go to any [facility] facility to get your imaging done  Please call [phone] to schedule your imaging appointment

## 2018-11-13 NOTE — CONSULTS
Orthopedics   Geetha Colon 75 y o  female MRN: [id number]  Unit/Bed#: X ray      Chief Complaint:   Back Pain    HPI:   75 y  o female with history of IMN after hip fracture in 2013 and subsequent conversion to L JONATHAN in 2017 by Dr Dariel Tadeo presents with back pain with radiation down to left leg for the last 3 weeks s/p injury while moving mattress  Patient pain is paraspinal at midline with radiation down posterior aspect of left leg down to feet  She does have radiation down her right leg at times to   No other traumas or injuries    Review Of Systems:   · Skin: Normal  · Neuro: See HPI  · Musculoskeletal: See HPI  · 14 point review of systems negative except as stated above     Past Medical History:   Past Medical History:   Diagnosis Date    Anxiety     Depression     Disease of thyroid gland     HYPO    Femur neck fracture (HCC)     GERD (gastroesophageal reflux disease)     Hyperthyroidism     RESOLVED: [id number]    Osteoporosis     Polio     PVD (peripheral vascular disease) ([facility] [address] )     Right BBB/left ant fasc block     UTI (urinary tract infection)     Varicella infection     LAST ASSESSED: [id number]       Past Surgical History:   Past Surgical History:   Procedure Laterality Date    APPENDECTOMY      HIP ARTHROPLASTY Left 11/27/2017    Procedure: REMOVAL IM NAIL CONVERSION TO TOTAL HIP ARTHROPLASTY POSTERIOR APPROACH;  Surgeon: Bobby Epps MD;  [facility];  Service: Orthopedics    HIP FRACTURE SURGERY      HIP SURGERY      both hips replaced;2013 left ,2014 right    JOINT REPLACEMENT Right     HIP TOTAL    ID CONV PREV HIP SURG TO TOT HIP Levonne Sessions Left 10/4/2017    Procedure: [place] removal of left hip;  Surgeon: Bobby Epps MD;  Location: [facility];  Service: Orthopedics    REVISION TOTAL HIP ARTHROPLASTY Right     TONSILLECTOMY         Family History:  Family history reviewed and non-contributory  Family History   Problem Relation Age of Onset    Rheum arthritis Mother  Rheum arthritis Sister     Prostate cancer Brother        Social History:  Social History     Social History    Marital status:      Spouse name: N/A    Number of children: 1    Years of education: N/A     Occupational History    RETIRED       Social History Main Topics    Smoking status: Former Smoker    Smokeless tobacco: Never Used      Comment: quit 20-30 years ago; NEVER A SMOKER AS PER ALL SCRIPTS     Alcohol use No    Drug use: No    Sexual activity: Not Asked     Other Topics Concern    None     Social History Narrative    Always uses seat belt    Caffeine use    Confucianist    Single as per all scripts        Allergies:   No Known Allergies        Labs:    0  Lab Value Date/Time   HCT 36 3 11/13/2018 0152   HCT 38 3 08/22/2018 0713   HCT 30 5 (L) 11/29/2017 0437   HCT 36 8 12/16/2014 0727   HCT 28 8 (L) 10/30/2014 0626   HCT 29 2 (L) 10/27/2014 0648   HGB 11 4 (L) 11/13/2018 0152   HGB 11 9 08/22/2018 0713   HGB 10 0 (L) 11/29/2017 0437   HGB 11 9 12/16/2014 0727   HGB 8 8 (L) 10/30/2014 0626   HGB 9 0 (L) 10/27/2014 0648   INR 0 95 11/09/2017 1217   INR 0 96 10/13/2014 0608   WBC 8 15 11/13/2018 0152   WBC 6 29 08/22/2018 0713   WBC 10 85 (H) 11/29/2017 0437   WBC 5 60 12/16/2014 0727   WBC 5 58 10/30/2014 0626   WBC 6 95 10/27/2014 0648       Meds:    Current Facility-Administered Medications:     aluminum-magnesium hydroxide-simethicone (MYLANTA) 200-200-20 mg/5 mL oral suspension 30 mL, 30 mL, Oral, Q6H PRN, Abundio Lund MD    ascorbic acid (VITAMIN C) tablet 1,000 mg, 1,000 mg, Oral, Daily, Abundio Lund MD    cholecalciferol (VITAMIN D3) tablet 1,000 Units, 1,000 Units, Oral, Daily, Abundio Lund MD    diazepam (VALIUM) tablet 5 mg, 5 mg, Oral, Q12H PRN, Abundio Lund MD    docusate sodium (COLACE) capsule 100 mg, 100 mg, Oral, BID, Abundio Lund MD    docusate sodium (COLACE) capsule 100 mg, 100 mg, Oral, BID PRN, Abundio Lund MD    enoxaparin (LOVENOX) subcutaneous injection 40 mg, 40 mg, Subcutaneous, Daily, Jeffery Samayoa MD    folic acid (FOLVITE) tablet 1,000 mcg, 1,000 mcg, Oral, Daily, Jeffery Samayoa MD    gabapentin (NEURONTIN) capsule 100 mg, 100 mg, Oral, TID, Jeffery Samayoa MD    HYDROmorphone (DILAUDID) injection 0 25 mg, 0 25 mg, Intravenous, Q4H PRN, Jeffery Samayoa MD    ibuprofen (MOTRIN) tablet 400 mg, 400 mg, Oral, Q6H PRN, Jeffery Samayoa MD, 400 mg at 11/13/18 0534    levothyroxine tablet 25 mcg, 25 mcg, Oral, Early Morning, Jeffery Samayoa MD, 25 mcg at 11/13/18 0534    lidocaine (LIDODERM) 5 % patch 2 patch, 2 patch, Topical, Once, Jc Mitchell MD, 2 patch at 11/13/18 0053    lidocaine (LIDODERM) 5 % patch 2 patch, 2 patch, Topical, Daily, Jeffery Samayoa MD    multivitamin-minerals (CENTRUM) tablet 1 tablet, 1 tablet, Oral, Daily, Jeffery Samayoa MD    nortriptyline (PAMELOR) capsule 25 mg, 25 mg, Oral, HS, Jeffery Samayoa MD    ondansetron [facility]) injection 4 mg, 4 mg, Intravenous, Q6H PRN, Jeffery Samayoa MD    oxyCODONE (ROXICODONE) IR tablet 2 5 mg, 2 5 mg, Oral, Q4H PRN, Jeffery Samayoa MD, 2 5 mg at 11/13/18 0534    predniSONE tablet 10 mg, 10 mg, Oral, Daily, Jeffery Samayoa MD    psyllium (METAMUCIL) 1 packet, 1 packet, Oral, TID, Jeffery Samayoa MD    vitamin E (tocopherol) capsule 400 Units, 400 Units, Oral, Daily, Jeffery Samayoa MD    Current Outpatient Prescriptions:     alendronate (FOSAMAX) 70 mg tablet, TAKE 1 TABLET ONCE WEEKLY, Disp: 4 tablet, Rfl: 2    Ascorbic Acid (VITAMIN C) 1000 MG tablet, Take 1,000 mg by mouth daily, Disp: , Rfl:     cholecalciferol (VITAMIN D3) 1,000 units tablet, Take 1,000 Units by mouth daily, Disp: , Rfl:     diazepam (VALIUM) 5 mg tablet, TAKE ONE TABLET BY MOUTH EVERY 12 HOURS AS NEEDED FOR ANXIETY , Disp: 60 tablet, Rfl: 0    docusate sodium (COLACE) 100 mg capsule, Take 1 capsule by mouth 2 (two) times a day, Disp: 10 capsule, Rfl: 0   folic acid (FOLVITE) 1 mg tablet, TAKE ONE TABLET BY MOUTH EVERY DAY, Disp: 30 tablet, Rfl: 2    gabapentin (NEURONTIN) 100 mg capsule, Take 1 capsule (100 mg total) by mouth 3 (three) times a day, Disp: 90 capsule, Rfl: 5    ibuprofen (MOTRIN) 400 mg tablet, Take 1 tablet (400 mg total) by mouth every 6 (six) hours as needed for mild pain, Disp: 30 tablet, Rfl: 0    levothyroxine 25 mcg tablet, TAKE ONE TABLET BY MOUTH EVERY DAY, Disp: 30 tablet, Rfl: 5    Multiple Vitamins-Minerals (CENTRUM SILVER PO), Take 1 tablet by mouth daily, Disp: , Rfl:     nortriptyline (PAMELOR) 10 mg capsule, TAKE ONE CAPSULE BY MOUTH AT BEDTIME, Disp: 30 capsule, Rfl: 5    nortriptyline (PAMELOR) 25 mg capsule, TAKE ONE CAPSULE BY MOUTH AT BEDTIME, Disp: 30 capsule, Rfl: 5    predniSONE 10 mg tablet, Take 10 mg by mouth daily, Disp: , Rfl:     Psyllium (METAMUCIL FIBER PO), Take 1 packet by mouth 3 (three) times a day  , Disp: , Rfl:     Vitamin E 400 units TABS, Take 400 Units by mouth 2 (two) times a day, Disp: , Rfl:     Blood Culture:   No results found for: BLOODCX    Wound Culture:   No results found for: WOUNDCULT    Ins and Outs:  No intake/output data recorded  Physical Exam:   /75 (BP Location: Right arm)   Pulse 79   Temp 98 °F (36 7 °C) (Tympanic)   Resp 16   Wt 47 4 kg (104 lb 8 oz)   SpO2 99%   BMI 20 41 kg/m²   Gen: Alert and oriented to person, place, time  HEENT: EOMI, eyes clear, moist mucus membranes, hearing intact  Respiratory: Bilateral chest rise   No audible wheezing found  Cardiovascular: No audible murmurs  Abdomen: soft  Musculoskeletal: BACK  · Skin intact, no open lesions  · Tenderness to palpation over Lumbar spine  · 4/5 strength with ankle dorsi/plantar flexion, EHL/FHL left lower extremities compared to right  · Sensation intact L2-S1 bilateral lower extremities  · positive Straight leg raise  · Negative FADIR, ADAM, log roll, and Stinchfield tests  · No TTP over left hip  · TTP over knee  · AROM left knee 10-50 degrees    Radiology:   I personally reviewed the films  X-rays  Lumbar spine shows L3 compression fracture  X-rays of pelvis show intact JONATHAN with other acute fractures or dislocations  Assessment:  75 y  o female with L3 compression fracture and lumbar radiculopathy    Plan:   · WBAT BL LE  · No hip pain  · Recommend neurosurgery consult and bracing  · Dispo: Ortho signing off      Ronnell Severs, MD

## 2018-11-14 ENCOUNTER — APPOINTMENT (INPATIENT)
Dept: RADIOLOGY | Facility: HOSPITAL | Age: 73
DRG: 544 | End: 2018-11-14
Payer: MEDICARE

## 2018-11-14 LAB — T4 FREE SERPL-MCNC: 0.85 NG/DL (ref 0.76–1.46)

## 2018-11-14 PROCEDURE — G8979 MOBILITY GOAL STATUS: HCPCS | Performed by: PHYSICAL THERAPIST

## 2018-11-14 PROCEDURE — 72131 CT LUMBAR SPINE W/O DYE: CPT

## 2018-11-14 PROCEDURE — 99232 SBSQ HOSP IP/OBS MODERATE 35: CPT | Performed by: PHYSICIAN ASSISTANT

## 2018-11-14 PROCEDURE — 99222 1ST HOSP IP/OBS MODERATE 55: CPT | Performed by: INTERNAL MEDICINE

## 2018-11-14 PROCEDURE — G8988 SELF CARE GOAL STATUS: HCPCS

## 2018-11-14 PROCEDURE — 84439 ASSAY OF FREE THYROXINE: CPT | Performed by: PHYSICIAN ASSISTANT

## 2018-11-14 PROCEDURE — 97163 PT EVAL HIGH COMPLEX 45 MIN: CPT | Performed by: PHYSICAL THERAPIST

## 2018-11-14 PROCEDURE — G8987 SELF CARE CURRENT STATUS: HCPCS

## 2018-11-14 PROCEDURE — 72100 X-RAY EXAM L-S SPINE 2/3 VWS: CPT

## 2018-11-14 PROCEDURE — G8978 MOBILITY CURRENT STATUS: HCPCS | Performed by: PHYSICAL THERAPIST

## 2018-11-14 PROCEDURE — 99232 SBSQ HOSP IP/OBS MODERATE 35: CPT | Performed by: NEUROLOGICAL SURGERY

## 2018-11-14 PROCEDURE — 97167 OT EVAL HIGH COMPLEX 60 MIN: CPT

## 2018-11-14 RX ORDER — OXYCODONE HYDROCHLORIDE 5 MG/1
5 TABLET ORAL EVERY 4 HOURS PRN
Status: DISCONTINUED | OUTPATIENT
Start: 2018-11-14 | End: 2018-11-19

## 2018-11-14 RX ORDER — LEVOTHYROXINE SODIUM 0.05 MG/1
50 TABLET ORAL
Status: DISCONTINUED | OUTPATIENT
Start: 2018-11-15 | End: 2018-11-21 | Stop reason: HOSPADM

## 2018-11-14 RX ADMIN — OXYCODONE HYDROCHLORIDE AND ACETAMINOPHEN 1000 MG: 500 TABLET ORAL at 11:06

## 2018-11-14 RX ADMIN — PSYLLIUM HUSK 1 PACKET: 3.4 POWDER ORAL at 16:10

## 2018-11-14 RX ADMIN — ENOXAPARIN SODIUM 40 MG: 40 INJECTION SUBCUTANEOUS at 09:31

## 2018-11-14 RX ADMIN — NORTRIPTYLINE HYDROCHLORIDE 25 MG: 25 CAPSULE ORAL at 21:11

## 2018-11-14 RX ADMIN — OXYCODONE HYDROCHLORIDE 5 MG: 5 TABLET ORAL at 11:06

## 2018-11-14 RX ADMIN — OXYCODONE HYDROCHLORIDE 5 MG: 5 TABLET ORAL at 05:46

## 2018-11-14 RX ADMIN — LEVOTHYROXINE SODIUM 25 MCG: 25 TABLET ORAL at 05:46

## 2018-11-14 RX ADMIN — DIAZEPAM 5 MG: 5 TABLET ORAL at 21:12

## 2018-11-14 RX ADMIN — GABAPENTIN 100 MG: 100 CAPSULE ORAL at 09:31

## 2018-11-14 RX ADMIN — PREDNISONE 10 MG: 10 TABLET ORAL at 09:31

## 2018-11-14 RX ADMIN — FOLIC ACID 1000 MCG: 1 TABLET ORAL at 09:31

## 2018-11-14 RX ADMIN — OXYCODONE HYDROCHLORIDE 2.5 MG: 5 TABLET ORAL at 01:44

## 2018-11-14 RX ADMIN — VITAMIN E CAP 400 UNIT 400 UNITS: 400 CAP at 12:58

## 2018-11-14 RX ADMIN — HYDROMORPHONE HYDROCHLORIDE 0.25 MG: 1 INJECTION, SOLUTION INTRAMUSCULAR; INTRAVENOUS; SUBCUTANEOUS at 09:30

## 2018-11-14 RX ADMIN — HYDROMORPHONE HYDROCHLORIDE 0.25 MG: 1 INJECTION, SOLUTION INTRAMUSCULAR; INTRAVENOUS; SUBCUTANEOUS at 03:42

## 2018-11-14 RX ADMIN — PSYLLIUM HUSK 1 PACKET: 3.4 POWDER ORAL at 09:32

## 2018-11-14 RX ADMIN — VITAMIN D, TAB 1000IU (100/BT) 1000 UNITS: 25 TAB at 09:30

## 2018-11-14 RX ADMIN — HYDROMORPHONE HYDROCHLORIDE 0.25 MG: 1 INJECTION, SOLUTION INTRAMUSCULAR; INTRAVENOUS; SUBCUTANEOUS at 13:52

## 2018-11-14 RX ADMIN — GABAPENTIN 100 MG: 100 CAPSULE ORAL at 16:10

## 2018-11-14 RX ADMIN — DOCUSATE SODIUM 100 MG: 100 CAPSULE, LIQUID FILLED ORAL at 09:31

## 2018-11-14 RX ADMIN — Medication 1 TABLET: at 09:31

## 2018-11-14 RX ADMIN — OXYCODONE HYDROCHLORIDE 5 MG: 5 TABLET ORAL at 18:44

## 2018-11-14 RX ADMIN — GABAPENTIN 100 MG: 100 CAPSULE ORAL at 21:11

## 2018-11-14 RX ADMIN — DOCUSATE SODIUM 100 MG: 100 CAPSULE, LIQUID FILLED ORAL at 18:44

## 2018-11-14 NOTE — PLAN OF CARE
Problem: OCCUPATIONAL THERAPY ADULT  Goal: Performs self-care activities at highest level of function for planned discharge setting  See evaluation for individualized goals  Treatment Interventions: ADL retraining, Functional transfer training, Endurance training, UE strengthening/ROM, Cognitive reorientation, Patient/family training, Equipment evaluation/education, Compensatory technique education, Continued evaluation, Energy conservation, Activityengagement          See flowsheet documentation for full assessment, interventions and recommendations  Limitation: Decreased ADL status, Decreased cognition, Decreased endurance, Decreased high-level ADLs, Decreased self-care trans  Prognosis: Fair  Assessment: Pt is a 75year old female seen for OT eval s/p admission to \[facility]\"" due to increasing back pain that radiates to her R hip, R buttock and R knee  Comorbidities include a h/o osteoporosis, hypothyroidism, HTN, and s/p arthroplasty  Pt with active OT orders and up with assistance orders  Pt is a poor historian and provides inconsistent information regarding home set-up and recent events  Pt initially reporting that she lives alone, however later states that her sister lives upstairs  Pt reports that she lives in a [facility] with [facility]  Pt was I with ADLs, IADLs and functional mobility using a rw PTA  Pt is currently functioning below her baseline level of performance as a result of the following impairments: pain, endurance, activity tolerance, functional mobility, functional standing tolerance, unsupportive home environment, decreased I w/ ADLS/IADLS, strength and cognitive impairments  The following Occupational Performance Areas to address include: grooming, bathing/shower, toilet hygiene, dressing and functional mobility  Pt scored overall 50/100 on the Barthel Index   Based on the aforementioned OT evaluation, functional performance deficits, and assessments, pt has been identified as a high complexity evaluation  Recommend STR upon D/C   Pt to continue to benefit from acute immediate OT services to address the following goals 3-5x/week to  w/in 7-10 days:      OT Discharge Recommendation: Short Term Rehab  OT - OK to Discharge: Yes (when medically stable)      Comments: FELICITAS Choudhury, OTR/L

## 2018-11-14 NOTE — ORTHOTIC NOTE
Orthotic Note            Date: 11/14/2018      Patient Name: David Rivas        Time: 8:45am    Reason for Consult:  Patient Active Problem List   Diagnosis    Dizziness    Anxiety    Peripheral vascular disease ([address] )    Epigastric discomfort    RLS (restless legs syndrome)    Arthritis of left hip    S/P total hip arthroplasty    Closed fracture of femur, neck (HCC)    Elevated alkaline phosphatase level    Hair loss    Hypothyroidism    Left hip pain    Osteoporosis    Primary localized osteoarthritis of left hip    RBBB    Hand arthritis    Screening for colon cancer    Carpal tunnel syndrome on right    Ambulatory dysfunction    Compression fracture of L3 lumbar vertebra, closed, initial encounter [facility])   [place] LSO   Per Neurosurgical    I measured patient for [place] LSO while patient was sitting up in bed  Instructions/adjsutments reviewed x's three  I will continue to follow up today with PT/OT for optimal fit of LSO brace  My contact information and instructions are at bedside  Recommendations:  Please call Mobility Coordinator at ext  [phone] in regards to bracing instruction and/or adjustment  Arin Knott Mobility Coordinator LCFo, LCOF, ASOP R  O T, O B T

## 2018-11-14 NOTE — ASSESSMENT & PLAN NOTE
· Continue thyroid medication    · TSH elevated, free T4 wnl  · Recommend repeat TSH with free T4 in 4-6 weeks

## 2018-11-14 NOTE — PLAN OF CARE
Problem: PHYSICAL THERAPY ADULT  Goal: Performs mobility at highest level of function for planned discharge setting  See evaluation for individualized goals  Treatment/Interventions: Functional transfer training, LE strengthening/ROM, Elevations, Therapeutic exercise, Endurance training, Patient/family training, Equipment eval/education, Bed mobility, Gait training, Compensatory technique education, Spoke to nursing, Spoke to case management          See flowsheet documentation for full assessment, interventions and recommendations  Prognosis: Good  Problem List: Decreased strength, Decreased endurance, Impaired balance, Decreased mobility, Decreased safety awareness, Pain  Assessment: pt admitted with progressive r hip and leg pain after recent fall  pt had prior B JONATHAN but pt dx with L3 compression fx  pt issued lso  pt refered to PT  pt was indep PTA, living alone  pt demonstrated moderate to severe functional limitations due to recent compression fx and prior debility due to hip replacements  pt has deficits in strength, balance, gait sequencign and stability, self care and activity tolerance due to uncontrolled pain  pt needed min assist for mobility, amb using rw for 20'  pt has slow guarded gait  pt lives alone, does not know if she could arrange 24/7 assist at home  currently recommend rehab due to inability to care for self  ptwill need skilled PT  Barriers to Discharge: Decreased caregiver support, Inaccessible home environment     Recommendation: Post acute IP rehab (vs 24/7 assist)     PT - OK to Discharge: Yes (rehab)    See flowsheet documentation for full assessment

## 2018-11-14 NOTE — PROGRESS NOTES
Progress Note - Geetha Rivera 1945, 75 y o  female MRN: [id number]    Unit/Bed#: CW2 210-02 Encounter: [id number]    Primary Care Provider: Allan Hernandez MD   Date and time admitted to hospital: 11/12/2018 11:37 PM      * Ambulatory dysfunction   Assessment & Plan    · Likely multifactorial   · She had a fall two weeks ago at home with increasing pain since then  · Imaging shows L3 compression fracture  · S/p total hip arthroplasty, orthopedics consulted, do not feel her sx are related to her hip arthroplasty and more so likely related to L3 compression fracture and recommend neurosurgery consult  · Continue PRN pain medications  · Will obtain X-ray of right knee as she is complaining of knee pain as well since she fell, this is negative for acute osseous abnormality   · Neurosurgery consulted, appreciate input  Recommend LSO brace to be worn OOB for comfort and improve stability of spine  · Upright XR with brace pending  · No surgical indications at this time    · PT/OT pending evaluations     Compression fracture of L3 lumbar vertebra, closed, initial encounter [facility])   Assessment & Plan    · See above plan   · Pain control PRN     Hypothyroidism   Assessment & Plan    · Continue thyroid medication  · TSH elevated, free T4 wnl  · Recommend repeat TSH with free T4 in 4-6 weeks     S/P total hip arthroplasty   Assessment & Plan    · Patient denies hip pain at this time, seen by ortho and do not suspect her back pain is associated with her total hip arthroplasty   · F/u with ortho outpatient       VTE Pharmacologic Prophylaxis:   Pharmacologic: Enoxaparin (Lovenox)  Mechanical VTE Prophylaxis in Place: Yes    Patient Centered Rounds: I have performed bedside rounds with nursing staff today  Discussions with Specialists or Other Care Team Provider: RN    Education and Discussions with Family / Patient: patient, declined call to family    Time Spent for Care: 30 minutes    More than 50% of total time spent on counseling and coordination of care as described above  Current Length of Stay: 1 day(s)    Current Patient Status: Inpatient   Certification Statement: The patient will continue to require additional inpatient hospital stay due to ambulatory dysfunction, L3 compression fx needs xray with brace    Discharge Plan: when safe discharge plan in place after pt/ot evaluation    Code Status: Level 1 - Full Code      Subjective:   Patient reports she is still having significant pain and has had no change in her back pain or her knee pain  She got her brace but has not had a chance to wear it yet, she will try after she gets pain medication  Objective:     Vitals:   Temp (24hrs), Av 1 °F (36 7 °C), Min:98 °F (36 7 °C), Max:98 2 °F (36 8 °C)    Temp:  [98 °F (36 7 °C)-98 2 °F (36 8 °C)] 98 2 °F (36 8 °C)  HR:  [68-74] 73  Resp:  [18-22] 18  BP: (115-143)/(57-80) 120/57  SpO2:  [98 %-100 %] 100 %  Body mass index is 18 47 kg/m²  Input and Output Summary (last 24 hours): Intake/Output Summary (Last 24 hours) at 18 0859  Last data filed at 18 0805   Gross per 24 hour   Intake              180 ml   Output              250 ml   Net              -70 ml       Physical Exam:     Physical Exam   Constitutional: She is oriented to person, place, and time  She appears well-developed and well-nourished  No distress  HENT:   Head: Normocephalic and atraumatic  Mouth/Throat: Oropharynx is clear and moist    Eyes: Pupils are equal, round, and reactive to light  EOM are normal    Neck: Neck supple  Cardiovascular: Normal rate, regular rhythm and normal heart sounds  No murmur heard  Pulmonary/Chest: Effort normal and breath sounds normal  No respiratory distress  She has no wheezes  She has no rales  She exhibits no tenderness  Abdominal: Soft  Bowel sounds are normal  She exhibits no distension  There is no tenderness  Musculoskeletal: Normal range of motion   She exhibits no edema or tenderness  Neurological: She is alert and oriented to person, place, and time  No cranial nerve deficit  Skin: Skin is warm and dry  No rash noted  She is not diaphoretic  No erythema  Psychiatric: She has a normal mood and affect  Her behavior is normal  Judgment and thought content normal    Vitals reviewed  Additional Data:     Labs:      Results from last 7 days  Lab Units 11/13/18  0152   WBC Thousand/uL 8 15   HEMOGLOBIN g/dL 11 4*   HEMATOCRIT % 36 3   PLATELETS Thousands/uL 364   NEUTROS PCT % 71   LYMPHS PCT % 18   MONOS PCT % 9   EOS PCT % 1       Results from last 7 days  Lab Units 11/13/18  0152   POTASSIUM mmol/L 3 5  3 5   CHLORIDE mmol/L 103  104   CO2 mmol/L 26  28   BUN mg/dL 23  22   CREATININE mg/dL 0 60  0 62   ANION GAP mmol/L 8  6   CALCIUM mg/dL 8 4  9 0   ALBUMIN g/dL 3 2*   TOTAL BILIRUBIN mg/dL 0 31   ALK PHOS U/L 99   ALT U/L 23   AST U/L 15                           * I Have Reviewed All Lab Data Listed Above  * Additional Pertinent Lab Tests Reviewed:  All Labs Within Last 24 Hours Reviewed    Imaging:    Imaging Reports Reviewed Today Include: XR knee  Imaging Personally Reviewed by Myself Includes:  XR knee    Recent Cultures (last 7 days):           Last 24 Hours Medication List:     Current Facility-Administered Medications:  aluminum-magnesium hydroxide-simethicone 30 mL Oral Q6H PRN Robbin Hale MD   vitamin C 1,000 mg Oral Daily Robbin Hale MD   cholecalciferol 1,000 Units Oral Daily Robbin Hale MD   diazepam 5 mg Oral Q12H PRN Robbin Hale MD   docusate sodium 100 mg Oral BID Robbin Hale MD   docusate sodium 100 mg Oral BID PRN Robbin Hale MD   enoxaparin 40 mg Subcutaneous Daily Robbin Hale MD   folic acid 8,869 mcg Oral Daily Robbin Hale MD   gabapentin 100 mg Oral TID Robbin Hale MD   HYDROmorphone 0 25 mg Intravenous Q4H PRN Robbin Hale MD   ibuprofen 400 mg Oral Q6H PRN Robbin Hale MD   levothyroxine 25 mcg Oral Early Morning Antony Perez MD   multivitamin-minerals 1 tablet Oral Daily Antony Perez MD   nortriptyline 25 mg Oral HS Antony Perez MD   ondansetron 4 mg Intravenous Q6H PRN Antony Perez MD   oxyCODONE 2 5 mg Oral Q4H PRN Antony Perez MD   oxyCODONE 5 mg Oral Q4H PRN Kera Lundberg PA-C   predniSONE 10 mg Oral Daily Antony Perez MD   psyllium 1 packet Oral TID Antony Perez MD   vitamin E (tocopherol) 400 Units Oral Daily Antony Perez MD        Today, Patient Was Seen By: Felicitas Shoemaker PA-C    ** Please Note: Dictation voice to text software may have been used in the creation of this document   **

## 2018-11-14 NOTE — PROGRESS NOTES
Progress Note - Neurosurgery   Geetha Colon 75 y o  female MRN: [id number]  Unit/Bed#: 2 210-02 Encounter: [id number]    Assessment:  1  L3 compression fracture with mild retropulsion  2  Status post fall  3  Ambulatory dysfunction  4  Arthritis the left hip  5  Hypothyroidism  6  Osteoporosis  7  Peripheral vascular disease  8  Restless leg syndrome - takes motrin daily  9  History total hip arthroplasty     Plan:  · Exam:  Alert and oriented x 3, ORNELAS with weakness in BLE inhibited by pain, decreased sensation to PP on L4 LUE and L4-5 RLE, reflexes 3+ and brisk, no clonus noted, + bilateral dominguez, DST intact, JPS 3/3, midline and paraspinal tenderness in low back  · Imaging reviewed personally and with attending  Results are as follows:  ? CT spine lumbar without contrast (11/14/2018): Acute or early subacute moderate L3 compression fracture involving the inferior endplate  Bony retropulsion of the posterior inferior margin of the vertebral body into the anterior epidural space with resulting canal stenosis and right-sided foraminal narrowing  Central disc herniations at multiple levels resulting in mild canal stenosis  ? X-ray spine lumbar 11/14/2018:  Compression deformity of the L3 vertebral body is re-demonstrated with approximately 30% vertebral body height loss  ? XR sacrum and coccyx (11/13/2018): Compression fracture of L3, with approximately 25-33% loss of height  ? XR lumbar spine (11/13/2018): Compression fracture of L3 that is new from 2014 study  Mild retropulsion but no high-grade spinal canal narrowing is seen at the level  Spinal alignment is maintained  ? XR pelvis (11/13/2018): Compression fracture of L3, with approximately 25-33% loss of height  · LSO brace ordered - to be worn OOB and HOB < 45 degrees  · Hold AC / AP - patient admits to daily use of motrin at home  · Medical management per primary team  · DVT ppx: SCDs and lovenox  · Mobilize as tolerated with assistance  ?  PT / OT  · Neurosurgery will continue to follow  No surgical intervention is recommended at this time  Patient will continue to wear LSO brace while out of bed and head of bed greater than 45°  Please call with questions or concerns  Subjective/Objective   Chief Complaint: "My back hurts " / follow up L3 compression fracture    Subjective:  Patient complains of Jesenia Ishikawa 10 back pain radiating to the right lower extremity  She denies numbness or tingling  She continues to have weakness in the bilateral lower extremities  She denies bowel or bladder problems, headache, dizziness, chest pain, shortness of breath, nausea, vomiting  States at home she ambulates with a walker at all times  After falling she was still able to ambulate  Objective:  Lying in bed, NAD  I/O       11/12 0701 - 11/13 0700 11/13 0701 - 11/14 0700 11/14 0701 - 11/15 0700    P  O    400    Total Intake(mL/kg)   400 (8 7)    Urine (mL/kg/hr)  250 (0 2) 325 (0 7)    Total Output   250 325    Net   -250 +75           Unmeasured Urine Occurrence  1 x           Invasive Devices     Peripheral Intravenous Line            Peripheral IV 11/13/18 Right Antecubital 1 day                Physical Exam:  Vitals: Blood pressure 126/61, pulse 78, temperature 98 2 °F (36 8 °C), temperature source Oral, resp  rate 16, height 5' 2" (1 575 m), weight 45 8 kg (101 lb), SpO2 96 %, currently breastfeeding  ,Body mass index is 18 47 kg/m²      General appearance: alert, appears stated age, cooperative and no distress  Head: Normocephalic, without obvious abnormality, atraumatic  Eyes: EOMI, PERRL  Neck: supple, symmetrical, trachea midline and NT  Back: no kyphosis present, low back midline and paraspinal tenderness to palpation  Lungs: non labored breathing  Heart: regular heart rate  Neurologic:   Mental status: Alert, oriented x3, thought content appropriate  Cranial nerves: grossly intact (Cranial nerves II-XII)  Sensory:  Decreased light touch and pinprick along the left medial calf  Motor: moving all extremities with some weakness in bilateral lower extremities, strength 5/5 except   RUE:  4+/5 biceps   LUE:  4-/5   BLE:  3/5 hip flexion, hip extension, knee extension, knee flexion  Reflexes:  3+ and brisk, bilateral Bryan noted, no clonus  Coordination: finger to nose normal bilaterally, no drift bilaterally    Lab Results:    Results from last 7 days  Lab Units 11/13/18  0152   WBC Thousand/uL 8 15   HEMOGLOBIN g/dL 11 4*   HEMATOCRIT % 36 3   PLATELETS Thousands/uL 364   NEUTROS PCT % 71   MONOS PCT % 9       Results from last 7 days  Lab Units 11/13/18  0152   POTASSIUM mmol/L 3 5  3 5   CHLORIDE mmol/L 103  104   CO2 mmol/L 26  28   BUN mg/dL 23  22   CREATININE mg/dL 0 60  0 62   CALCIUM mg/dL 8 4  9 0   ALK PHOS U/L 99   ALT U/L 23   AST U/L 15                 No results found for: TROPONINT  ABG:No results found for: PHART, UTY4BCC, PO2ART, MMN9FSP, N1JMZMTS, BEART, SOURCE    Imaging Studies: I have personally reviewed pertinent reports  and I have personally reviewed pertinent films in PACS    Xr Spine Lumbar 2 Or 3 Views Injury    Result Date: 11/14/2018  Impression: Compression deformity of the L3 vertebral body is redemonstrated with approximately 30% vertebral body height loss  Workstation performed: [id number]     Xr Lumbar Spine 2 Or 3 Views    Result Date: 11/13/2018  Impression: Compression fracture of L3 as described, age indeterminant but new from the 2014 exam   Correlation with the patient's symptoms recommended  Mild retropulsion but no high-grade spinal canal narrowing is seen at this level  Spinal alignment is maintained  Visualized bones otherwise appear intact  Other findings as above  Workstation performed: [id number]     Xr Sacrum And Coccyx    Result Date: 11/13/2018  Impression: Compression fracture of L3, with approximately 25-33% loss of height; age indeterminate, better seen on the dedicated lumbar spine views  Visualized bones otherwise appear intact  Other findings as above  Workstation performed: [id number]     Xr Knee 4+ Vw Right Injury    Result Date: 11/13/2018  Impression: No acute osseous abnormality  Workstation performed: [id number]     Ct Spine Lumbar Wo Contrast    Result Date: 11/14/2018  Impression: Acute early subacute moderate L3 compression fracture involving the inferior endplate  Bony retropulsion of the posterior inferior margin of the vertebral body into the anterior epidural space with resulting canal stenosis and right-sided foraminal narrowing  Central disc herniations at multiple levels resulting in mild canal stenosis  The study was marked in [facility] for immediate notification  Workstation performed: [id number]     Xr Pelvis Ap Only 1 Or 2 Vw    Result Date: 11/13/2018  Impression: Compression fracture of L3, with approximately 25-33% loss of height; age indeterminate, better seen on the dedicated lumbar spine views  Visualized bones otherwise appear intact  Other findings as above  Workstation performed: [id number]     EKG, Pathology, and Other Studies: I have personally reviewed pertinent reports        VTE Pharmacologic Prophylaxis: Enoxaparin (Lovenox)    VTE Mechanical Prophylaxis: sequential compression device

## 2018-11-14 NOTE — PHYSICAL THERAPY NOTE
Physical Therapy Evaluation      Patient Active Problem List   Diagnosis    Dizziness    Anxiety    [facility]    Epigastric discomfort    RLS (restless legs syndrome)    Arthritis of left hip    S/P total hip arthroplasty    Closed fracture of femur, neck (HCC)    Elevated alkaline phosphatase level    Hair loss    Hypothyroidism    Left hip pain    Osteoporosis    Primary localized osteoarthritis of left hip    RBBB    Hand arthritis    Screening for colon cancer    Carpal tunnel syndrome on right    Ambulatory dysfunction    Compression fracture of L3 lumbar vertebra, closed, initial [facility])       Past Medical History:   Diagnosis Date    Anxiety     Depression     Disease of thyroid gland     HYPO    Femur neck fracture (HCC)     GERD (gastroesophageal reflux disease)     Hyperthyroidism     RESOLVED: [id number]    Osteoporosis     Polio     [facility]VD [facility]peripheral vascular disease) [facility]     Right BBB/left ant fasc block     UTI (urinary tract infection)     Varicella infection     LAST ASSESSED: [id number]       Past Surgical History:   Procedure Laterality Date    APPENDECTOMY      HIP ARTHROPLASTY Left 11/27/2017    Procedure: REMOVAL IM NAIL CONVERSION TO TOTAL HIP ARTHROPLASTY POSTERIOR APPROACH;  Surgeon: Octavia Mitchell MD;  [facility];  Service: Orthopedics    HIP FRACTURE SURGERY      HIP SURGERY      both hips replaced;2013 left ,2014 right    JOINT REPLACEMENT Right     HIP TOTAL    SD CONV PREV HIP SURG TO TOT HIP ARTHROPLAS Left 10/4/2017    Procedure: Bev Kirkland removal of left hip;  Surgeon: Octavia Mitchell MD;  [facility]: [facility];  Service: Orthopedics    REVISION TOTAL HIP ARTHROPLASTY Right     TONSILLECTOMY        11/14/18 1040   Note Type   Note type Eval only   Pain Assessment   Pain Assessment 0-10   Pain Score 9   Pain Type Acute pain   Pain Location Back   Pain Orientation Lower   Hospital Pain Intervention(s) Repositioned; Ambulation/increased activity; Emotional support   Response to Interventions no change   Home Living   Type of Home House   Home Layout Two level  (bilevel home  )   Home Equipment Walker   Prior Function   Level of [place] Independent with ADLs and functional mobility; Needs assistance with ADLs and functional mobility   Lives With Spouse; Alone   Receives Help From Family   ADL Assistance Independent   IADLs Needs assistance   Falls in the last 6 months 1 to 4   Comments pt uses rw for amb  had recetn fall  pt reporting leg pain  history of polio affecting lle   Restrictions/Precautions   Other Precautions Spinal precautions; Fall Risk;Pain   General   Family/Caregiver Present No   Cognition   Overall Cognitive Status WFL   Orientation Level Oriented X4   RUE Assessment   RUE Assessment WFL   LUE Assessment   LUE Assessment WFL   RLE Assessment   RLE Assessment WFL   LLE Assessment   LLE Assessment X  (strength 4+/5)   Coordination   Movements are Fluid and Coordinated 1   Sensation WFL   Bed Mobility   Rolling R 4  Minimal assistance   Additional items Assist x 1; Increased time required;Verbal cues;LE management   Sit to Supine 4  Minimal assistance   Additional items Assist x 1; Increased time required;Verbal cues;LE management   Transfers   Sit to Stand 4  Minimal assistance   Additional items Assist x 1; Increased time required;Verbal cues   Stand to Sit 4  Minimal assistance   Additional items Assist x 1; Increased time required;Verbal cues   Ambulation/Elevation   Gait pattern Antalgic;Decreased foot clearance; Inconsistent ora; Short stride; Step to;Excessively slow   Gait Assistance 4  Minimal assist   Additional items Assist x 1;Verbal cues; Tactile cues   Assistive Device Rolling walker   Distance 20'   Balance   Static Sitting Fair +   Dynamic Sitting Poor +   Static Standing Fair -   Dynamic Standing Poor +   Ambulatory Poor +   Endurance Deficit   Endurance Deficit Yes   Endurance Deficit Description back and leg pain   Activity Tolerance   Activity Tolerance Patient limited by pain   Nurse Made Aware yes   Assessment   Prognosis Good   Problem List Decreased strength;Decreased endurance; Impaired balance;Decreased mobility; Decreased safety awareness;Pain   Assessment pt admitted with progressive r hip and leg pain after recent fall  pt had prior B JONATHAN but pt dx with L3 compression fx  pt issued lso  pt refered to PT  pt was indep PTA, living alone  pt demonstrated moderate to severe functional limitations due to recent compression fx and prior debility due to hip replacements  pt has deficits in strength, balance, gait sequencign and stability, self care and activity tolerance due to uncontrolled pain  pt needed min assist for mobility, amb using rw for 20'  pt has slow guarded gait  pt lives alone, does not know if she could arrange 24/7 assist at home  currently recommend rehab due to inability to care for self  ptwill need skilled PT   Barriers to Discharge Decreased caregiver support; Inaccessible home environment   Goals   Patient Goals less pain   STG Expiration Date 11/21/18   Short Term Goal #1 bed mobility with supervision  transfers with contact guard  amb using rw and lso for > 150'  stairs with railing and min assist  indep don and doff lso  demonstrate good safety practices  Plan   Treatment/Interventions Functional transfer training;LE strengthening/ROM; Elevations; Therapeutic exercise; Endurance training;Patient/family training;Equipment eval/education; Bed mobility;Gait training; Compensatory technique education;Spoke to nursing;Spoke to case management   PT Frequency (3-5x/wk)   Recommendation   Recommendation Post acute IP rehab  (vs 24/7 assist)   PT - OK to Discharge Yes  (rehab)   Modified [place] Scale   Modified Cari Scale 4   Barthel Index   Feeding 10   Bathing 0   Grooming Score 0   Dressing Score 5   Bladder Score 10   Bowels Score 10   Toilet Use Score 5   Transfers (Bed/Chair) Score 10   Mobility (Level Surface) Score 0   Stairs Score 0   Barthel Index Score 50   History: co - morbidities, fall risk, use of assistive device, assist for adl's, lifting restrictoin, home alone  Exam: impairments in locomotion, musculoskeletal, balance, joint integrity, pain control  Clinical: unstable/unpredictable  Complexity:high        Jacqueline Padron, PT

## 2018-11-14 NOTE — ASSESSMENT & PLAN NOTE
· Likely multifactorial   · She had a fall two weeks ago at home with increasing pain since then  · Imaging shows L3 compression fracture  · S/p total hip arthroplasty, orthopedics consulted, do not feel her sx are related to her hip arthroplasty and more so likely related to L3 compression fracture and recommend neurosurgery consult  · Continue PRN pain medications  · Will obtain X-ray of right knee as she is complaining of knee pain as well since she fell, this is negative for acute osseous abnormality   · Neurosurgery consulted, appreciate input  Recommend LSO brace to be worn OOB for comfort and improve stability of spine      · Upright XR with brace pending  · No surgical indications at this time    · PT/OT pending evaluations

## 2018-11-14 NOTE — PHYSICIAN ADVISOR
Current patient class: Inpatient  The patient is currently on Hospital Day: 2 at [address] [place] [address]      The patient was admitted to the hospital at [place] [address] on 11/13/18 for the following diagnosis:  Hip pain [M25 559]  Compression fracture of L3 lumbar vertebra, closed, initial encounter ([facility] [address] ) [S32 030A]  Arthritis of left hip [M16 12]       There is documentation in the medical record of an expected length of stay of at least 2 midnights  The patient is therefore expected to satisfy the 2 midnight benchmark and given the 2 midnight presumption is appropriate for INPATIENT ADMISSION  Given this expectation of a satisfying stay, CMS instructs us that the patient is most often appropriate for inpatient admission under part A provided medical necessity is documented in the chart  After review of the relevant documentation, labs, vital signs and test results, the patient is appropriate for a SEPARATE INPATIENT ADMISSION  Admission to the hospital as an inpatient is a complex decision making process which requires the practitioner to consider the patients presenting complaint, history and physical examination and all relevant testing  With this in mind, in this case, the patient was deemed appropriate for INPATIENT ADMISSION  After review of the documentation and testing available at the time of the admission I concur with this clinical determination of medical necessity  Rationale is as follows: The patient is a 75 yrs old Female who presented to the ED at 11/12/2018 11:37 PM with a chief complaint of Hip Pain (Pt with bilateral hip replacements c/o right hip pain for several days  Pt has appointment tomorrow but could not wait because of the pain   Pt denies injury to area )    The patients vitals on arrival were ED Triage Vitals   Temperature Pulse Respirations Blood Pressure SpO2   11/12/18 2340 11/12/18 2340 11/12/18 2340 11/12/18 2340 11/12/18 2340   98 °F (36 7 °C) 81 16 167/81 97 %      Temp Source Heart Rate Source Patient Position - Orthostatic VS BP Location FiO2 (%)   11/12/18 2340 11/13/18 0517 11/13/18 0915 11/13/18 0517 --   Tympanic Monitor Sitting Right arm       Pain Score       11/13/18 0054       Worst Possible Pain           Past Medical History:   Diagnosis Date    Anxiety     Depression     Disease of thyroid gland     HYPO    Femur neck fracture (HCC)     GERD (gastroesophageal reflux disease)     Hyperthyroidism     RESOLVED: [id number]    Osteoporosis     Polio     PVD (peripheral vascular disease) ([facility] [address] )     Right BBB/left ant fasc block     UTI (urinary tract infection)     Varicella infection     LAST ASSESSED: [id number]     Past Surgical History:   Procedure Laterality Date    APPENDECTOMY      HIP ARTHROPLASTY Left 11/27/2017    Procedure: REMOVAL IM NAIL CONVERSION TO TOTAL HIP ARTHROPLASTY POSTERIOR APPROACH;  Surgeon: Lily Vega MD;  [facility];  Service: Orthopedics    [address] [place] [address]      both hips replaced;2013 left ,2014 right    JOINT REPLACEMENT Right     HIP TOTAL    ND CONV PREV HIP SURG TO TOT HIP ARTHROPLAS Left 10/4/2017    Procedure: Hareware removal of left hip;  Surgeon: Lily Vega MD;  [facility];  Service: Orthopedics    REVISION TOTAL HIP ARTHROPLASTY Right     TONSILLECTOMY             Consults have been placed to:   IP CONSULT TO ORTHOPEDIC SURGERY  IP CONSULT TO ENDOCRINOLOGY  IP CONSULT TO NEUROSURGERY    Vitals:    11/13/18 1400 11/13/18 1616 11/13/18 1747 11/13/18 2300   BP:  130/80 128/73 115/63   BP Location:  Right arm Left arm Left arm   Pulse:  68 71 68   Resp:  18 18 18   Temp:   98 °F (36 7 °C) 98 °F (36 7 °C)   TempSrc:   Tympanic Oral   SpO2:  100% 100% 98%   Weight:   45 8 kg (101 lb)    Height: 5' (1 524 m)  5' 2" (1 575 m)        Most recent labs:    Recent Labs      11/13/18   0152   WBC  8 15   HGB  11 4*   HCT  36 3   PLT  364   K  3 5  3 5 CALCIUM  8 4  9 0   BUN  23  22   CREATININE  0 60  0 62   AST  15   ALT  23   ALKPHOS  99       Scheduled Meds:  Current Facility-Administered Medications:  aluminum-magnesium hydroxide-simethicone 30 mL Oral Q6H PRN Jose Carson MD   vitamin C 1,000 mg Oral Daily Jose Carson MD   cholecalciferol 1,000 Units Oral Daily Jose Carson MD   diazepam 5 mg Oral Q12H PRN Jose Carson MD   docusate sodium 100 mg Oral BID Jose Carson MD   docusate sodium 100 mg Oral BID PRN Jose Carson MD   enoxaparin 40 mg Subcutaneous Daily Jose Carson MD   folic acid 2,250 mcg Oral Daily Jose Carson MD   gabapentin 100 mg Oral TID Jose Carson MD   HYDROmorphone 0 25 mg Intravenous Q4H PRN Jose Carson MD   ibuprofen 400 mg Oral Q6H PRN Jose Carson MD   levothyroxine 25 mcg Oral Early Morning Joes Carson MD   multivitamin-minerals 1 tablet Oral Daily Jose Carson MD   nortriptyline 25 mg Oral HS Jose Carson MD   ondansetron 4 mg Intravenous Q6H PRN Jose Carson MD   oxyCODONE 2 5 mg Oral Q4H PRN Jose Carson MD   predniSONE 10 mg Oral Daily Jose Carson MD   psyllium 1 packet Oral TID Jose Carson MD   vitamin E (tocopherol) 400 Units Oral Daily Jose Carson MD     Continuous Infusions:   PRN Meds:   aluminum-magnesium hydroxide-simethicone    diazepam    docusate sodium    HYDROmorphone    ibuprofen    ondansetron    oxyCODONE    Surgical procedures (if appropriate):

## 2018-11-14 NOTE — CONSULTS
Consultation - Yumiko Rivera 75 y o  female MRN: [id number]    Unit/Bed#: CW2 210-02 Encounter: [id number]      Assessment/Plan     Assessment/Plan:  1  Osteoporosis:  Secondary workup already started  Vitamin-D level still pending  Will add spep and upep  Given her fracture while on Fosamax as well as severe osteoporosis based on lumbar spine T-score from DEXA scan from April 2017, I suspect she will benefit from anabolic therapy  She will need to follow up with Endocrinology as an outpatient to arrange this and consider other workup beforehand such as at another DEXA scan verses other secondary workup  2  Hypothyroidism:  Will increase levothyroxine to 50 mcg daily  She will need a repeat TSH and free T4 in about 6 weeks or so as an outpatient  Inpatient consult to Endocrinology  Consult performed by: Maribeth Kohli ordered by: Yanet Rivera          CC: own the bone    History of Present Illness     HPI: Maryuri Lema is a 75y o  year old female with history of ambulatory dysfunction, arthritis, hip fractures 3 and 5 years ago with hip replacements, hypothyroidism, osteoporosis, peripheral artery disease is admitted after a fall with low back pain and L3 compression fracture  Endocrinology is consulted for osteoporosis  She does have history of osteoporosis and is being treated with Fosamax through her family doctor  She does not take calcium supplement as this causes constipation  She does take vitamin-D about 400 units per day per the patient at home  She does report a history of hip fractures in the past   She reports a family history of osteoporosis in her sister as well as her mother  She reports falls in the past as well  She denies any other medical therapy for osteoporosis other than Fosamax which was started a few months ago  She reports going through menopause in her 45s  Her only complaint right now is back pain      Review of Systems   Constitutional: Negative for appetite change, chills and fever  HENT: Negative for congestion and trouble swallowing  Eyes: Negative for visual disturbance  Respiratory: Negative for shortness of breath  Cardiovascular: Negative for chest pain, palpitations and leg swelling  Gastrointestinal: Negative for abdominal pain, nausea and vomiting  Endocrine: Negative for polydipsia and polyuria  Genitourinary: Negative for difficulty urinating and frequency  Musculoskeletal: Positive for back pain  Skin: Negative for rash  Neurological: Negative for dizziness and weakness  Psychiatric/Behavioral: Negative for agitation and confusion         Historical Information   Past Medical History:   Diagnosis Date    Anxiety     Depression     Disease of thyroid gland     HYPO    Femur neck fracture (HCC)     GERD (gastroesophageal reflux disease)     Hyperthyroidism     RESOLVED: [id number]    Osteoporosis     Polio     PVD (peripheral vascular disease) (HCC)     Right BBB/left ant fasc block     UTI (urinary tract infection)     Varicella infection     LAST ASSESSED: [id number]     Past Surgical History:   Procedure Laterality Date    APPENDECTOMY      HIP ARTHROPLASTY Left 11/27/2017    Procedure: REMOVAL IM NAIL CONVERSION TO TOTAL HIP ARTHROPLASTY POSTERIOR APPROACH;  Surgeon: Magda Britt MD;  [facility];  Service: Orthopedics    HIP FRACTURE SURGERY      HIP SURGERY      both hips replaced;2013 left ,2014 right    JOINT REPLACEMENT Right     HIP TOTAL    ID CONV PREV HIP SURG TO TOT HIP Donold Citizen Left 10/4/2017    Procedure: Roosevelt Hoffman removal of left hip;  Surgeon: Magda Britt MD;  [facility];  Service: Orthopedics    REVISION TOTAL HIP ARTHROPLASTY Right     TONSILLECTOMY       Social History   History   Alcohol Use No     History   Drug Use No     History   Smoking Status    Former Smoker   Smokeless Tobacco    Never Used     Comment: quit 20-30 years ago; NEVER A SMOKER AS PER ALL SCRIPTS      Family History:   Family History   Problem Relation Age of Onset    Rheum arthritis Mother     Rheum arthritis Sister     Prostate cancer Brother        Meds/Allergies   Current Facility-Administered Medications   Medication Dose Route Frequency Provider Last Rate Last Dose    aluminum-magnesium hydroxide-simethicone (MYLANTA) 200-200-20 mg/5 mL oral suspension 30 mL  30 mL Oral Q6H PRN Larry Fernandez MD        ascorbic acid (VITAMIN C) tablet 1,000 mg  1,000 mg Oral Daily Larry Fernandez MD   1,000 mg at 11/14/18 1106    cholecalciferol (VITAMIN D3) tablet 1,000 Units  1,000 Units Oral Daily Larry Fernandez MD   1,000 Units at 11/14/18 0930    diazepam (VALIUM) tablet 5 mg  5 mg Oral Q12H PRN Larry Fernandez MD   5 mg at 11/13/18 2023    docusate sodium (COLACE) capsule 100 mg  100 mg Oral BID Larry Fernandez MD   100 mg at 11/14/18 0931    docusate sodium (COLACE) capsule 100 mg  100 mg Oral BID PRN Larry Fernandez MD   100 mg at 11/13/18 0858    enoxaparin (LOVENOX) subcutaneous injection 40 mg  40 mg Subcutaneous Daily Larry Fernandez MD   40 mg at 09/48/14 6126    folic acid (FOLVITE) tablet 1,000 mcg  1,000 mcg Oral Daily Larry Fernandez MD   1,000 mcg at 11/14/18 0931    gabapentin (NEURONTIN) capsule 100 mg  100 mg Oral TID Larry Fernandez MD   100 mg at 11/14/18 0931    HYDROmorphone (DILAUDID) injection 0 25 mg  0 25 mg Intravenous Q4H PRN Larry Fernandez MD   0 25 mg at 11/14/18 0930    ibuprofen (MOTRIN) tablet 400 mg  400 mg Oral Q6H PRN Larry Fernandez MD   400 mg at 11/13/18 1223    levothyroxine tablet 25 mcg  25 mcg Oral Early Morning Larry Fernandez MD   25 mcg at 11/14/18 0546    multivitamin-minerals (CENTRUM) tablet 1 tablet  1 tablet Oral Daily Larry Fernandez MD   1 tablet at 11/14/18 0931    nortriptyline (PAMELOR) capsule 25 mg  25 mg Oral HS Larry Fernandez MD        ondansetron [facility]) injection 4 mg  4 mg Intravenous Q6H PRN Larry Fernandez MD        oxyCODONE (ROXICODONE) IR tablet 2 5 mg  2 5 mg Oral Q4H PRN Steve Amaral MD   2 5 mg at 11/14/18 0144    oxyCODONE (ROXICODONE) IR tablet 5 mg  5 mg Oral Q4H PRN Bridgette Gimenez PA-C   5 mg at 11/14/18 1106    predniSONE tablet 10 mg  10 mg Oral Daily Steve Amaral MD   10 mg at 11/14/18 0931    psyllium (METAMUCIL) 1 packet  1 packet Oral TID Steve Amaral MD   1 packet at 11/14/18 0932    vitamin E (tocopherol) capsule 400 Units  400 Units Oral Daily Steve Amaral MD   400 Units at 11/14/18 1258     No Known Allergies    Objective   Vitals: Blood pressure 120/57, pulse 73, temperature 98 2 °F (36 8 °C), temperature source Oral, resp  rate 18, height 5' 2" (1 575 m), weight 45 8 kg (101 lb), SpO2 100 %, currently breastfeeding  Intake/Output Summary (Last 24 hours) at 11/14/18 1315  Last data filed at 11/14/18 1154   Gross per 24 hour   Intake              400 ml   Output              250 ml   Net              150 ml     Invasive Devices     Peripheral Intravenous Line            Peripheral IV 11/13/18 Right Antecubital 1 day                Physical Exam   Constitutional: She is oriented to person, place, and time  She appears well-developed and well-nourished  No distress  HENT:   Head: Normocephalic and atraumatic  Eyes: Pupils are equal, round, and reactive to light  Conjunctivae are normal    Neck: Normal range of motion  Neck supple  Cardiovascular: Normal rate and regular rhythm  Pulmonary/Chest: Effort normal and breath sounds normal  No respiratory distress  Abdominal: Soft  Bowel sounds are normal  She exhibits no distension  Musculoskeletal: She exhibits no edema  Neurological: She is alert and oriented to person, place, and time  Skin: Skin is warm and dry  No rash noted  She is not diaphoretic  Psychiatric: She has a normal mood and affect  Her behavior is normal    Vitals reviewed        The history was obtained from the review of the chart, patient  Lab Results:       Lab Results   Component Value Date    WBC 8 15 11/13/2018    HGB 11 4 (L) 11/13/2018    HCT 36 3 11/13/2018    MCV 91 11/13/2018     11/13/2018     Lab Results   Component Value Date/Time    BUN 23 11/13/2018 01:52 AM    BUN 22 11/13/2018 01:52 AM    BUN 16 10/30/2014 06:26 AM     (L) 10/30/2014 06:26 AM    K 3 5 11/13/2018 01:52 AM    K 3 5 11/13/2018 01:52 AM    K 4 1 10/30/2014 06:26 AM     11/13/2018 01:52 AM     11/13/2018 01:52 AM     10/30/2014 06:26 AM    CO2 26 11/13/2018 01:52 AM    CO2 28 11/13/2018 01:52 AM    CO2 30 10/30/2014 06:26 AM    CREATININE 0 60 11/13/2018 01:52 AM    CREATININE 0 62 11/13/2018 01:52 AM    CREATININE 0 55 (L) 10/30/2014 06:26 AM    AST 15 11/13/2018 01:52 AM    AST 32 10/13/2014 06:08 AM    ALT 23 11/13/2018 01:52 AM    ALT 17 10/13/2014 06:08 AM    ALB 3 2 (L) 11/13/2018 01:52 AM    ALB 2 7 (L) 10/13/2014 06:08 AM     No results for input(s): CHOL, HDL, LDL, TRIG, VLDL in the last 72 hours  No results found for: Jessica Russ  No results found for: POCGLU    Imaging Studies: I have personally reviewed pertinent reports  04/17/2017:  CENTRAL  DXA SCAN     CLINICAL HISTORY:   66year old  female risk factors include estrogen deficient     TECHNIQUE: Bone densitometry was performed using a Horizon A bone densitometer  Regions of interest appear properly placed  There are no obvious fractures or other confounding variables which could limit the study           COMPARISON:  None      RESULTS:   LUMBAR SPINE:  L3-4:  BMD 0 337 gm/cm2  T-score -6 9  Z-score -4 5     LEFT FOREARM :  BMD 0 500 gm/sq-cm,  T-score is  -3 2  Z-score is  -0 8                  IMPRESSION:  1  Based on the [facility] classification, the T-score of -6 9 in the lumbar spine is consistent with osteoporosis    2   According to the [facility], prescription therapy is recommended with a T-score of -2 5 or less in the spine or hip after appropriate evaluation to exclude secondary causes  3   A daily intake of at least 1200 mg Calcium and 800 to 1000 IU of Vitamin D, as well as weight bearing and muscle strengthening exercise, fall prevention and avoidance of tobacco and excessive alcohol intake as  basic preventive measures are   suggested  4   Repeat DXA  in 18 - 24 months, on the same machine, as clinically indicated         WHO CLASSIFICATION:  Normal (a T-score of -1 0 or higher)  Low bone mineral density (a T-score of less than -1 0 but higher than -2 5)  Osteoporosis (a T-score of -2 5 or less)  Severe osteoporosis (a T-score of -2 5 or less with a fragility fracture)       Portions of the record may have been created with voice recognition software  Occasional wrong word or "sound a like" substitutions may have occurred due to the inherent limitations of voice recognition software  Read the chart carefully and recognize, using context, where substitutions have occurred

## 2018-11-15 PROCEDURE — 99232 SBSQ HOSP IP/OBS MODERATE 35: CPT | Performed by: PHYSICIAN ASSISTANT

## 2018-11-15 PROCEDURE — 97530 THERAPEUTIC ACTIVITIES: CPT

## 2018-11-15 PROCEDURE — 97110 THERAPEUTIC EXERCISES: CPT | Performed by: PHYSICAL THERAPIST

## 2018-11-15 PROCEDURE — 99232 SBSQ HOSP IP/OBS MODERATE 35: CPT | Performed by: NEUROLOGICAL SURGERY

## 2018-11-15 PROCEDURE — 97535 SELF CARE MNGMENT TRAINING: CPT | Performed by: PHYSICAL THERAPIST

## 2018-11-15 PROCEDURE — 97116 GAIT TRAINING THERAPY: CPT | Performed by: PHYSICAL THERAPIST

## 2018-11-15 RX ORDER — ACETAMINOPHEN 325 MG/1
975 TABLET ORAL EVERY 8 HOURS SCHEDULED
Status: DISCONTINUED | OUTPATIENT
Start: 2018-11-15 | End: 2018-11-20

## 2018-11-15 RX ADMIN — HYDROMORPHONE HYDROCHLORIDE 0.25 MG: 1 INJECTION, SOLUTION INTRAMUSCULAR; INTRAVENOUS; SUBCUTANEOUS at 17:50

## 2018-11-15 RX ADMIN — DIAZEPAM 5 MG: 5 TABLET ORAL at 20:28

## 2018-11-15 RX ADMIN — OXYCODONE HYDROCHLORIDE AND ACETAMINOPHEN 1000 MG: 500 TABLET ORAL at 09:49

## 2018-11-15 RX ADMIN — HYDROMORPHONE HYDROCHLORIDE 0.25 MG: 1 INJECTION, SOLUTION INTRAMUSCULAR; INTRAVENOUS; SUBCUTANEOUS at 09:43

## 2018-11-15 RX ADMIN — FOLIC ACID 1000 MCG: 1 TABLET ORAL at 09:47

## 2018-11-15 RX ADMIN — ACETAMINOPHEN 975 MG: 325 TABLET, FILM COATED ORAL at 13:56

## 2018-11-15 RX ADMIN — OXYCODONE HYDROCHLORIDE 5 MG: 5 TABLET ORAL at 13:55

## 2018-11-15 RX ADMIN — HYDROMORPHONE HYDROCHLORIDE 0.25 MG: 1 INJECTION, SOLUTION INTRAMUSCULAR; INTRAVENOUS; SUBCUTANEOUS at 00:27

## 2018-11-15 RX ADMIN — GABAPENTIN 100 MG: 100 CAPSULE ORAL at 17:50

## 2018-11-15 RX ADMIN — HYDROMORPHONE HYDROCHLORIDE 0.25 MG: 1 INJECTION, SOLUTION INTRAMUSCULAR; INTRAVENOUS; SUBCUTANEOUS at 21:53

## 2018-11-15 RX ADMIN — LEVOTHYROXINE SODIUM 50 MCG: 50 TABLET ORAL at 05:41

## 2018-11-15 RX ADMIN — NORTRIPTYLINE HYDROCHLORIDE 35 MG: 10 CAPSULE ORAL at 21:53

## 2018-11-15 RX ADMIN — Medication 1 TABLET: at 09:47

## 2018-11-15 RX ADMIN — VITAMIN D, TAB 1000IU (100/BT) 1000 UNITS: 25 TAB at 09:47

## 2018-11-15 RX ADMIN — DOCUSATE SODIUM 100 MG: 100 CAPSULE, LIQUID FILLED ORAL at 09:47

## 2018-11-15 RX ADMIN — VITAMIN E CAP 400 UNIT 400 UNITS: 400 CAP at 09:49

## 2018-11-15 RX ADMIN — GABAPENTIN 100 MG: 100 CAPSULE ORAL at 20:28

## 2018-11-15 RX ADMIN — PREDNISONE 10 MG: 10 TABLET ORAL at 09:47

## 2018-11-15 RX ADMIN — ENOXAPARIN SODIUM 40 MG: 40 INJECTION SUBCUTANEOUS at 09:47

## 2018-11-15 RX ADMIN — ACETAMINOPHEN 975 MG: 325 TABLET, FILM COATED ORAL at 09:56

## 2018-11-15 RX ADMIN — OXYCODONE HYDROCHLORIDE 5 MG: 5 TABLET ORAL at 04:34

## 2018-11-15 RX ADMIN — GABAPENTIN 100 MG: 100 CAPSULE ORAL at 09:56

## 2018-11-15 RX ADMIN — ACETAMINOPHEN 975 MG: 325 TABLET, FILM COATED ORAL at 21:53

## 2018-11-15 RX ADMIN — OXYCODONE HYDROCHLORIDE 5 MG: 5 TABLET ORAL at 20:28

## 2018-11-15 NOTE — ASSESSMENT & PLAN NOTE
· Likely multifactorial   · She had a fall two weeks ago at home with increasing pain since then  · Imaging shows L3 compression fracture  · S/p total hip arthroplasty, orthopedics consulted, do not feel her sx are related to her hip arthroplasty and more so likely related to L3 compression fracture and recommend neurosurgery consult  · Continue PRN pain medications  · Will obtain X-ray of right knee as she is complaining of knee pain as well since she fell, this is negative for acute osseous abnormality   · Neurosurgery consulted, appreciate input  Recommend LSO brace to be worn OOB for comfort and improve stability of spine  · Upright XR with brace shows compression deformity of the L3 vertebral bodies re-demonstrated with approximately 30% vertebral body height loss  · CT lumbar spine shows acute early subacute moderate L3 compression fracture involving the inferior endplate  Bony retropulsion of the posterior inferior margin of the vertebral body into the anterior epidural space with resulting canal stenosis and right-sided foraminal narrowing  Central disc herniations at multiple levels resulting in mild canal stenosis    · No surgical intervention is indicated at this time per Neurosurgery  · Her pain is still not well controlled, will consult acute Pain Service for assistance  · PT/OT recommending short-term rehab

## 2018-11-15 NOTE — ASSESSMENT & PLAN NOTE
· Continue thyroid medication    · TSH elevated, seen by Endocrinology, will increase Synthroid  · Repeat TSH in 4-6 weeks

## 2018-11-15 NOTE — PROGRESS NOTES
Progress Note - Neurosurgery   Geetha Colon 75 y o  female MRN: [id number]  Unit/Bed#: CW2 210-02 Encounter: [id number]    Assessment:  1  L3 compression fracture with mild retropulsion  2  Status post fall  3  Ambulatory dysfunction  4  Arthritis the left hip  5  Hypothyroidism  6  Osteoporosis  7  Peripheral vascular disease  8  Restless leg syndrome - takes motrin daily  9  History total hip arthroplasty     Plan:  · Exam:  Alert and oriented x 3, ORNELAS with weakness in BLE inhibited by pain, decreased sensation to PP on L4 LUE and L4-5 RLE, reflexes 3+ and brisk, no clonus noted, + bilateral dominguez, DST intact, JPS 3/3, midline and paraspinal tenderness in low back  · Imaging reviewed personally and with attending  Results are as follows:  ? CT spine lumbar without contrast (11/14/2018): Acute or early subacute moderate L3 compression fracture involving the inferior endplate  Bony retropulsion of the posterior inferior margin of the vertebral body into the anterior epidural space with resulting canal stenosis and right-sided foraminal narrowing  Central disc herniations at multiple levels resulting in mild canal stenosis  ? X-ray spine lumbar 11/14/2018:  Compression deformity of the L3 vertebral body is re-demonstrated with approximately 30% vertebral body height loss  ? XR sacrum and coccyx (11/13/2018): Compression fracture of L3, with approximately 25-33% loss of height  ? XR lumbar spine (11/13/2018): Compression fracture of L3 that is new from 2014 study  Mild retropulsion but no high-grade spinal canal narrowing is seen at the level  Spinal alignment is maintained  ? XR pelvis (11/13/2018): Compression fracture of L3, with approximately 25-33% loss of height    · LSO brace ordered - to be worn OOB and HOB < 45 degrees  · MRI lumbar ordered and pending to evaluate for possible nerve compression given patients radicular pain in right leg  · Hold AC / AP - patient admits to daily use of motrin at home  · Medical management per primary team  · DVT ppx: SCDs and lovenox  · Mobilize as tolerated with assistance  ? PT / OT  · Neurosurgery will continue to follow pending MRI lumbar results  No surgical intervention is recommended at this time; pending MRI results for further treatment options  Patient will continue to wear LSO brace while out of bed and head of bed greater than 45°  Please call with questions or concerns  Subjective/Objective   Chief Complaint: "My back hurts " / follow up L3 compression fracture    Subjective: Patient continues to complain of low back pain, rated a 7/10, sharp pain  Right leg pain is 7/10, dull sensation and worsened with walking or movement; she states pain is all over leg down to the ankle  She states the LSO brace is helping a little and she was able to wear it yesterday for an hour while sitting in the chair  She denies new numbness / tingling / weakness  She admits that left leg is always weak and has not changed and that she used to rely heavily on right leg to support her prior to being in hospital   She is urinating and BM per baseline  She denies headache, dizziness, CP, SOB, abdominal pain, N/V  Objective: laying in bed, NAD    I/O       11/13 0701 - 11/14 0700 11/14 0701 - 11/15 0700 11/15 0701 - 11/16 0700    P  O   680     Total Intake(mL/kg)  680 (15 5)     Urine (mL/kg/hr) 250 (0 2) 775 (0 7) 200 (0 7)    Total Output 250 775 200    Net -250 -95 -200           Unmeasured Urine Occurrence 1 x            Invasive Devices     Peripheral Intravenous Line            Peripheral IV 11/15/18 Left Forearm less than 1 day                Physical Exam:  Vitals: Blood pressure 123/56, pulse 76, temperature 98 2 °F (36 8 °C), temperature source Oral, resp  rate 20, height 5' 2" (1 575 m), weight 44 kg (96 lb 14 4 oz), SpO2 93 %, currently breastfeeding  ,Body mass index is 17 72 kg/m²      General appearance: alert, appears stated age, cooperative and no distress  Head: Normocephalic, without obvious abnormality, atraumatic  Eyes: EOMI, PERRL  Neck: supple, symmetrical, trachea midline and NT  Back: no kyphosis present, low back pain midline tenderness to palpation  Lungs: non labored breathing  Heart: regular heart rate  Neurologic:   Mental status: Alert, oriented x 3, thought content appropriate  Cranial nerves: grossly intact (Cranial nerves II-XII)  Sensory: normal to LT and PP throughout, DST intact, JPS 3/3  Motor: moving all extremities with continued weakness in BLE   BUE:  5/5 throughout   BLE:  3/5 HF/HE/KE/KF, 5/5 DF/PF  Reflexes: 3+ and slightly brisk, no dominguez or clonus noted  Coordination: finger to nose normal bilaterally, no drift bilaterally    Lab Results:    Results from last 7 days  Lab Units 11/13/18  0152   WBC Thousand/uL 8 15   HEMOGLOBIN g/dL 11 4*   HEMATOCRIT % 36 3   PLATELETS Thousands/uL 364   NEUTROS PCT % 71   MONOS PCT % 9       Results from last 7 days  Lab Units 11/13/18  0152   POTASSIUM mmol/L 3 5  3 5   CHLORIDE mmol/L 103  104   CO2 mmol/L 26  28   BUN mg/dL 23  22   CREATININE mg/dL 0 60  0 62   CALCIUM mg/dL 8 4  9 0   ALK PHOS U/L 99   ALT U/L 23   AST U/L 15                 No results found for: TROPONINT  ABG:No results found for: PHART, HRL9PDT, PO2ART, TMJ5RVE, C0KOJNNC, BEART, SOURCE    Imaging Studies: I have personally reviewed pertinent reports  and I have personally reviewed pertinent films in PACS    Xr Spine Lumbar 2 Or 3 Views Injury    Result Date: 11/14/2018  Impression: Compression deformity of the L3 vertebral body is redemonstrated with approximately 30% vertebral body height loss  Workstation performed: [id number]     Xr Lumbar Spine 2 Or 3 Views    Result Date: 11/13/2018  Impression: Compression fracture of L3 as described, age indeterminant but new from the 2014 exam   Correlation with the patient's symptoms recommended    Mild retropulsion but no high-grade spinal canal narrowing is seen at this level  Spinal alignment is maintained  Visualized bones otherwise appear intact  Other findings as above  Workstation performed: [id number]     Xr Sacrum And Coccyx    Result Date: 11/13/2018  Impression: Compression fracture of L3, with approximately 25-33% loss of height; age indeterminate, better seen on the dedicated lumbar spine views  Visualized bones otherwise appear intact  Other findings as above  Workstation performed: [id number]     Xr Knee 4+ Vw Right Injury    Result Date: 11/13/2018  Impression: No acute osseous abnormality  Workstation performed: [id number]     Ct Spine Lumbar Wo Contrast    Result Date: 11/14/2018  Impression: Acute early subacute moderate L3 compression fracture involving the inferior endplate  Bony retropulsion of the posterior inferior margin of the vertebral body into the anterior epidural space with resulting canal stenosis and right-sided foraminal narrowing  Central disc herniations at multiple levels resulting in mild canal stenosis  The study was marked in [facility] for immediate notification  Workstation performed: [id number]     Xr Pelvis Ap Only 1 Or 2 Vw    Result Date: 11/13/2018  Impression: Compression fracture of L3, with approximately 25-33% loss of height; age indeterminate, better seen on the dedicated lumbar spine views  Visualized bones otherwise appear intact  Other findings as above  Workstation performed: [id number]     EKG, Pathology, and Other Studies: I have personally reviewed pertinent reports        VTE Pharmacologic Prophylaxis: Enoxaparin (Lovenox)    VTE Mechanical Prophylaxis: sequential compression device

## 2018-11-15 NOTE — PLAN OF CARE
Problem: PHYSICAL THERAPY ADULT  Goal: Performs mobility at highest level of function for planned discharge setting  See evaluation for individualized goals  Treatment/Interventions: Functional transfer training, LE strengthening/ROM, Elevations, Therapeutic exercise, Endurance training, Patient/family training, Equipment eval/education, Bed mobility, Gait training, Compensatory technique education, Spoke to nursing, Spoke to case management          See flowsheet documentation for full assessment, interventions and recommendations  Outcome: Progressing  Prognosis: Good  Problem List: Decreased strength, Decreased endurance, Impaired balance, Decreased mobility, Decreased safety awareness, Pain, Orthopedic restrictions  Assessment: pt continues to have very high level of pain but is willing to participate in PT  pt needed mod assist for most mobility  pt was more antalgic today, slight buckling r knee on occasion  pt needed assist to steer rw  pt instructed in aldair and doff lso, pt also performed supine aa/arom ble for 10 reps sets  pt needs occasional rest to reduce pain  pt performed ex in pain free rom  pt will need continued skilled PT and rehab  Barriers to Discharge: Decreased caregiver support     Recommendation: Post acute IP rehab     PT - OK to Discharge: Yes (rehab)    See flowsheet documentation for full assessment

## 2018-11-15 NOTE — PROGRESS NOTES
Progress Note - Geetha Rivera 1945, 75 y o  female MRN: [id number]    Unit/Bed#: CW2 210-02 Encounter: [id number]    Primary Care Provider: Mushtaq Blackmon MD   Date and time admitted to hospital: 11/12/2018 11:37 PM      * Ambulatory dysfunction   Assessment & Plan    · Likely multifactorial   · She had a fall two weeks ago at home with increasing pain since then  · Imaging shows L3 compression fracture  · S/p total hip arthroplasty, orthopedics consulted, do not feel her sx are related to her hip arthroplasty and more so likely related to L3 compression fracture and recommend neurosurgery consult  · Continue PRN pain medications  · Will obtain X-ray of right knee as she is complaining of knee pain as well since she fell, this is negative for acute osseous abnormality   · Neurosurgery consulted, appreciate input  Recommend LSO brace to be worn OOB for comfort and improve stability of spine  · Upright XR with brace shows compression deformity of the L3 vertebral bodies re-demonstrated with approximately 30% vertebral body height loss  · CT lumbar spine shows acute early subacute moderate L3 compression fracture involving the inferior endplate  Bony retropulsion of the posterior inferior margin of the vertebral body into the anterior epidural space with resulting canal stenosis and right-sided foraminal narrowing  Central disc herniations at multiple levels resulting in mild canal stenosis  · No surgical intervention is indicated at this time per Neurosurgery  · Her pain is still not well controlled, will consult acute Pain Service for assistance  · PT/OT recommending short-term rehab     Compression fracture of L3 lumbar vertebra, closed, initial encounter [facility])   Assessment & Plan    · See above plan for details  · Seen by Endocrinology for her osteoporosis  Given that she had a fracture while on Fosamax, she may benefit from anabolic therapy    She will need to follow up with Endocrinology as an outpatient  · Pain control PRN     Hypothyroidism   Assessment & Plan    · Continue thyroid medication  · TSH elevated, seen by Endocrinology, will increase Synthroid  · Repeat TSH in 4-6 weeks     S/P total hip arthroplasty   Assessment & Plan    · Patient denies hip pain at this time, seen by ortho and do not suspect her back pain is associated with her total hip arthroplasty   · F/u with ortho outpatient       VTE Pharmacologic Prophylaxis:   Pharmacologic: Enoxaparin (Lovenox)  Mechanical VTE Prophylaxis in Place: No    Patient Centered Rounds: I have performed bedside rounds with nursing staff today  Discussions with Specialists or Other Care Team Provider: RNALAYNA    Education and Discussions with Family / Patient: jill    Time Spent for Care: 20 minutes  More than 50% of total time spent on counseling and coordination of care as described above  Current Length of Stay: 2 day(s)    Current Patient Status: Inpatient   Certification Statement: The patient will continue to require additional inpatient hospital stay due to intractable pain, needs safe discharge plan    Discharge Plan:  When her pain is better controlled, needs safe discharge, pending rehab    Code Status: Level 1 - Full Code      Subjective:   Patient looks visibly uncomfortable  She reports she is still having significant pain in her back  She reports she has not gotten much relief with pain medications  She is agreeable to trying alternative therapies and having the acute pain service come see her  Otherwise no acute complaints  Objective:     Vitals:   Temp (24hrs), Av 1 °F (36 7 °C), Min:97 8 °F (36 6 °C), Max:98 2 °F (36 8 °C)    Temp:  [97 8 °F (36 6 °C)-98 2 °F (36 8 °C)] 98 2 °F (36 8 °C)  HR:  [67-78] 76  Resp:  [16-20] 20  BP: (123-126)/(56-62) 123/56  SpO2:  [93 %-98 %] 93 %  Body mass index is 17 72 kg/m²  Input and Output Summary (last 24 hours):        Intake/Output Summary (Last 24 hours) at 11/15/18 2086  Last data filed at 11/15/18 0901   Gross per 24 hour   Intake              500 ml   Output              975 ml   Net             -475 ml       Physical Exam:     Physical Exam   Constitutional: She is oriented to person, place, and time  She appears well-developed  Appears uncomfortable, grimacing   HENT:   Head: Normocephalic and atraumatic  Mouth/Throat: Oropharynx is clear and moist    Eyes: Pupils are equal, round, and reactive to light  EOM are normal  No scleral icterus  Neck: Normal range of motion  Neck supple  Cardiovascular: Normal rate, regular rhythm and normal heart sounds  No murmur heard  Pulmonary/Chest: Effort normal and breath sounds normal  No respiratory distress  She has no wheezes  She has no rales  She exhibits no tenderness  Abdominal: Soft  Bowel sounds are normal  She exhibits no distension  There is no tenderness  Musculoskeletal: She exhibits no edema or tenderness  Limited ROM 2/2 pain   Neurological: She is alert and oriented to person, place, and time  No cranial nerve deficit  Skin: Skin is warm and dry  No rash noted  She is not diaphoretic  No erythema  Psychiatric: She has a normal mood and affect  Her behavior is normal  Judgment and thought content normal    Vitals reviewed  Additional Data:     Labs:      Results from last 7 days  Lab Units 11/13/18  0152   WBC Thousand/uL 8 15   HEMOGLOBIN g/dL 11 4*   HEMATOCRIT % 36 3   PLATELETS Thousands/uL 364   NEUTROS PCT % 71   LYMPHS PCT % 18   MONOS PCT % 9   EOS PCT % 1       Results from last 7 days  Lab Units 11/13/18  0152   POTASSIUM mmol/L 3 5  3 5   CHLORIDE mmol/L 103  104   CO2 mmol/L 26  28   BUN mg/dL 23  22   CREATININE mg/dL 0 60  0 62   ANION GAP mmol/L 8  6   CALCIUM mg/dL 8 4  9 0   ALBUMIN g/dL 3 2*   TOTAL BILIRUBIN mg/dL 0 31   ALK PHOS U/L 99   ALT U/L 23   AST U/L 15                           * I Have Reviewed All Lab Data Listed Above    * Additional Pertinent Lab Tests Reviewed: All Labs Within Last 24 Hours Reviewed    Imaging:    Imaging Reports Reviewed Today Include: CT lumbar spine, XR spine  Imaging Personally Reviewed by Myself Includes:  CT lumbar spine, XR spine    Recent Cultures (last 7 days):           Last 24 Hours Medication List:     Current Facility-Administered Medications:  acetaminophen 975 mg Oral Q8H [address] Debi Al PA-C   aluminum-magnesium hydroxide-simethicone 30 mL Oral Q6H PRN Luis M Casey MD   vitamin C 1,000 mg Oral Daily Luis M Casey MD   cholecalciferol 1,000 Units Oral Daily Luis M Casey MD   diazepam 5 mg Oral Q12H PRN Luis M Casey MD   docusate sodium 100 mg Oral BID Luis M Casey MD   docusate sodium 100 mg Oral BID PRN Luis M Casey MD   enoxaparin 40 mg Subcutaneous Daily Luis M Casey MD   folic acid 9,400 mcg Oral Daily Luis M Casey MD   gabapentin 100 mg Oral TID Luis M Casey MD   HYDROmorphone 0 25 mg Intravenous Q4H PRN Luis M Casey MD   ibuprofen 400 mg Oral Q6H PRN Luis M Casey MD   levothyroxine 50 mcg Oral Early Morning Yousif Montez DO   multivitamin-minerals 1 tablet Oral Daily Luis M Casey MD   nortriptyline 25 mg Oral HS Luis M Casey MD   ondansetron 4 mg Intravenous Q6H PRN Luis M Casey MD   oxyCODONE 2 5 mg Oral Q4H PRN Luis M Casey MD   oxyCODONE 5 mg Oral Q4H PRN Nelida Melendez PA-C   predniSONE 10 mg Oral Daily Luis M Casey MD   psyllium 1 packet Oral TID Luis M Casey MD   vitamin E (tocopherol) 400 Units Oral Daily Luis M Casey MD        Today, Patient Was Seen By: Gregorio Phillip PA-C    ** Please Note: Dictation voice to text software may have been used in the creation of this document   **

## 2018-11-15 NOTE — SOCIAL WORK
Initial interview:     CM met with the patient to review the CM role and discuss possible dc needs  Pt lives alone in a 2 story home in [place], [place]  1 [address]  1st floor bedroom, bathroom with tub shower  Pt is independent, uses a SPC and drives  Pt has a SPC, RW, BSC and a shower chair  Hx of VNA -can't remember agency  Hx of IP rehab at , "I will never go back to that awful place  Pt denied drug, etoh or mental illness history  No POA or LW  Prescriptions are filled at [facility] in [place]  Main contact: Sister Kvng Soler [phone]  Dtr Wendi Corral [phone]    Admit Dx: Fall, Hip pain, Compression Fx to L3  Hx Polio, PVD, R BBB, Anxiety & Depression  Pt expressed understanding of her diagnosis and treatment regimen  CM review therapy recommendation for IP rehab, with the patient and explained contrast from Home PT vs IP rehab and need for 24/7 assist  Pt reported that her Sister and Dtrs can be available to help her 24/7  Pt is undecided whether she wants to go to IP rehab or home with PT services  CM left the patient a list of rehab and VNAs  CM will follow  CM reviewed d/c planning process including the following: identifying help at home, patient preference for d/c planning needs, Discharge Lounge, [facility] Meds to Bed program, availability of treatment team to discuss questions or concerns patient and/or family may have regarding understanding medications and recognizing signs and symptoms once discharged  CM also encouraged patient to follow up with all recommended appointments after discharge  Patient advised of importance for patient and family to participate in managing patients medical well being

## 2018-11-15 NOTE — CONSULTS
Inpatient consult to Acute Pain Service  Consult performed by: Ej Layton, [address] ordered by: Ervin Martin        Consultation - Anesthesia Acute Pain Management   Alyssa Baroney Colon 75 y o  female MRN: [id number]  Unit/Bed#: CW2 210-02 Encounter: [id number]               Consult Time:  39 minutes    [facility]; Patient aged 72 years & older:  [facility] was not discussed  Assessment/Plan     Assessment:   75year old female having acute on chronic pain  Plan:   1  PDMP REVIEWED     2  ACUTE PAIN- Pain is located to her lower back  Pain increases with movement  C/O pain making it difficult to walk  Describes as stabbing  Rates 9/10  Patient walking with PT during my exam      Start   Lidoderm patch, 5 % , one patch daily     Increase   Dilaudid 0 5 mg IV q 4 hours p r n  Breakthrough pain, hold for sedation- plan to stop after MRI     Continue  Tylenol 975 mg p o  q  8 hours scheduled ( denies liver DX, AST15/ALT23)  Gabapentin 100 mg p o  Tid   Valium 5 mg PO Q 12 HRS PRN - home med   Oxycodone IR 2 5 mg p o  q 4 hours p r n  Moderate pain, hold for sedation  Oxycodone IR 5 mg p o  q 4 hours p r n  Severe pain, hold for sedation    3  BOWEL REGIMEN - continue colace and senna   4  PLAN OF CARE-geriatric multimodal pain regimen   5  PDMP REVIEWEDF   09/12/2018  2  09/12/2018  DIAZEPAM 5 MG TABLET  60 0  30       History of Present Illness    Admit Date:  11/12/2018  Hospital Day:  2 days  Primary Service:  Hospitalist  Attending Provider: Bobby Jones MD  Physician Requesting Consult:  Bobby Jones MD  Reason for Consult / Principal Problem: ACUTE ON CHRONIC BACK PAIN     Pain History:  Pain History: BACK PAIN   Current pain location(s): BACK PAIN , LOWER BACK   Pain Scale:   9/10  Severity:  STABBING   Quality: increases with movement   Pain Relief Goal:  5  Treatment History:  Valium   Historical Information   Past Medical History:   Diagnosis Date    Anxiety     Depression     Disease of thyroid gland     HYPO    Femur neck fracture (HCC)     GERD (gastroesophageal reflux disease)     Hyperthyroidism     RESOLVED: [id number]    Osteoporosis     Polio     PVD (peripheral vascular disease) (HCC)     Right BBB/left ant fasc block     UTI (urinary tract infection)     Varicella infection     LAST ASSESSED: [id number]     Past Surgical History:   Procedure Laterality Date    APPENDECTOMY      HIP ARTHROPLASTY Left 11/27/2017    Procedure: REMOVAL IM NAIL CONVERSION TO TOTAL HIP ARTHROPLASTY POSTERIOR APPROACH;  Surgeon: Dennis Lemus MD;  [facility];  Service: Orthopedics    HIP FRACTURE SURGERY      HIP SURGERY      both hips replaced;2013 left ,2014 right    JOINT REPLACEMENT Right     HIP TOTAL    MS CONV PREV HIP SURG TO TOT HIP ARTHROPLAS Left 10/4/2017    Procedure: Hareware removal of left hip;  Surgeon: Dennis Lemus MD;  Location: [facility];  Service: Orthopedics    REVISION TOTAL HIP ARTHROPLASTY Right     TONSILLECTOMY       Social History   History   Alcohol Use No     History   Drug Use No     History   Smoking Status    Former Smoker   Smokeless Tobacco    Never Used     Comment: quit 20-30 years ago; NEVER A SMOKER AS PER ALL SCRIPTS      Family History: non-contributory    Meds/Allergies   Current Facility-Administered Medications   Medication Dose Route Frequency    acetaminophen (TYLENOL) tablet 975 mg  975 mg Oral Q8H [address]    aluminum-magnesium hydroxide-simethicone (MYLANTA) 200-200-20 mg/5 mL oral suspension 30 mL  30 mL Oral Q6H PRN    ascorbic acid (VITAMIN C) tablet 1,000 mg  1,000 mg Oral Daily    cholecalciferol (VITAMIN D3) tablet 1,000 Units  1,000 Units Oral Daily    diazepam (VALIUM) tablet 5 mg  5 mg Oral Q12H PRN    docusate sodium (COLACE) capsule 100 mg  100 mg Oral BID    docusate sodium (COLACE) capsule 100 mg  100 mg Oral BID PRN    enoxaparin (LOVENOX) subcutaneous injection 40 mg  40 mg Subcutaneous Daily    folic acid (FOLVITE) tablet 1,000 mcg  1,000 mcg Oral Daily    gabapentin (NEURONTIN) capsule 100 mg  100 mg Oral TID    HYDROmorphone (DILAUDID) injection 0 25 mg  0 25 mg Intravenous Q4H PRN    ibuprofen (MOTRIN) tablet 400 mg  400 mg Oral Q6H PRN    levothyroxine tablet 50 mcg  50 mcg Oral Early Morning    multivitamin-minerals (CENTRUM) tablet 1 tablet  1 tablet Oral Daily    nortriptyline (PAMELOR) capsule 25 mg  25 mg Oral HS    ondansetron (ZOFRAN) injection 4 mg  4 mg Intravenous Q6H PRN    oxyCODONE (ROXICODONE) IR tablet 2 5 mg  2 5 mg Oral Q4H PRN    oxyCODONE (ROXICODONE) IR tablet 5 mg  5 mg Oral Q4H PRN    predniSONE tablet 10 mg  10 mg Oral Daily    psyllium (METAMUCIL) 1 packet  1 packet Oral TID    vitamin E (tocopherol) capsule 400 Units  400 Units Oral Daily     Prescriptions Prior to Admission   Medication    alendronate (FOSAMAX) 70 mg tablet    Ascorbic Acid (VITAMIN C) 1000 MG tablet    cholecalciferol (VITAMIN D3) 1,000 units tablet    diazepam (VALIUM) 5 mg tablet    docusate sodium (COLACE) 100 mg capsule    folic acid (FOLVITE) 1 mg tablet    gabapentin (NEURONTIN) 100 mg capsule    ibuprofen (MOTRIN) 400 mg tablet    levothyroxine 25 mcg tablet    Multiple Vitamins-Minerals (CENTRUM SILVER PO)    nortriptyline (PAMELOR) 10 mg capsule    nortriptyline (PAMELOR) 25 mg capsule    predniSONE 10 mg tablet    Psyllium (METAMUCIL FIBER PO)    Vitamin E 400 units TABS         No Known Allergies    Objective   Temp:  [97 8 °F (36 6 °C)-98 2 °F (36 8 °C)] 98 2 °F (36 8 °C)  HR:  [67-78] 76  Resp:  [16-20] 20  BP: (123-126)/(56-62) 123/56    Intake/Output Summary (Last 24 hours) at 11/15/18 1455  Last data filed at 11/15/18 0901   Gross per 24 hour   Intake              280 ml   Output              650 ml   Net             -370 ml       Lab Results: I have personally reviewed pertinent labs      Counseling / Coordination of Care  Total floor / unit time spent today 45 minutes minutes  Greater than 50% of total time was spent with the patient and / or family counseling and / or coordination of care  Please note that the APS provides consultative services regarding pain management only  With the exception of ketamine and epidural infusions and except when indicated, final decisions regarding starting or changing doses of analgesic medications are at the discretion of the consulting service  Off hours consultation and/or medication management is generally not available  CONNIE Srivastava  November 15, 2018  2:55 PM            Review of Systems   Constitutional: Negative for chills and fever  Respiratory: Negative for chest tightness and shortness of breath  Gastrointestinal: Negative for abdominal distention  Genitourinary: Negative for difficulty urinating  Musculoskeletal: Positive for arthralgias, back pain, gait problem and joint swelling  Neurological: Negative for dizziness  Psychiatric/Behavioral: Negative for agitation  Physical Exam   Constitutional: She is oriented to person, place, and time  She appears well-developed and well-nourished  No distress  HENT:   Head: Normocephalic  Eyes: Pupils are equal, round, and reactive to light  Neck: Normal range of motion  Cardiovascular: Normal rate and regular rhythm  Pulmonary/Chest: Effort normal    Abdominal: Soft  Musculoskeletal: She exhibits edema, tenderness and deformity  Neurological: She is alert and oriented to person, place, and time  Psychiatric: She has a normal mood and affect   Her behavior is normal  Judgment and thought content normal

## 2018-11-15 NOTE — PHYSICAL THERAPY NOTE
Physical Therapy Progress Note     11/15/18 0935   Pain Assessment   Pain Assessment 0-10   Pain Score 9   Pain Type Acute pain   Pain Location Back   Pain Orientation Lower   Hospital Pain Intervention(s) Repositioned; Ambulation/increased activity; Emotional support   Response to Interventions pain increase with movement   Restrictions/Precautions   Weight Bearing Precautions Per Order No   Braces or Orthoses LSO   Other Precautions Fall Risk;Pain;Spinal precautions   General   Chart Reviewed Yes   Family/Caregiver Present No   Cognition   Overall Cognitive Status WFL   Orientation Level Oriented X4   Subjective   Subjective back is still severely painful  Bed Mobility   Rolling R 4  Minimal assistance   Additional items Assist x 1; Increased time required;Verbal cues;LE management   Supine to Sit 3  Moderate assistance   Additional items Assist x 1; Increased time required;Verbal cues;LE management   Transfers   Sit to Stand 3  Moderate assistance   Additional items Assist x 1; Increased time required;Verbal cues   Stand to Sit 3  Moderate assistance   Additional items Assist x 1; Increased time required;Verbal cues   Ambulation/Elevation   Gait pattern Antalgic;Decreased foot clearance; Excessively slow; Step to;Short stride  (assist to advance rw)   Gait Assistance 3  Moderate assist   Additional items Other (Comment); Assist x 1  (needed more assist today)   Assistive Device Rolling walker   Distance 17'   Balance   Static Sitting Fair +   Dynamic Sitting Fair -   Static Standing Fair -   Dynamic Standing Poor +   Ambulatory Poor +   Endurance Deficit   Endurance Deficit Yes   Endurance Deficit Description back pain   Activity Tolerance   Activity Tolerance Patient limited by pain   Nurse Made Aware yes- jesi   Exercises   TKR Supine;10 reps;20 reps;AROM;AAROM; Bilateral  (aarom antigravity exercise )   Assessment   Prognosis Good   Problem List Decreased strength;Decreased endurance; Impaired balance;Decreased mobility; Decreased safety awareness;Pain;Orthopedic restrictions   Assessment pt continues to have very high level of pain but is willing to participate in PT  pt needed mod assist for most mobility  pt was more antalgic today, slight buckling r knee on occasion  pt needed assist to steer rw  pt instructed in yaz lso, pt also performed supine aa/arom ble for 10 reps sets  pt needs occasional rest to reduce pain  pt performed ex in pain free rom  pt will need continued skilled PT and rehab  Barriers to Discharge Decreased caregiver support   Goals   Patient Goals less pain   STG Expiration Date 11/21/18   Treatment Day 1   Plan   Treatment/Interventions Functional transfer training;LE strengthening/ROM; Therapeutic exercise; Endurance training;Patient/family training;Equipment eval/education; Bed mobility;Gait training;Spoke to nursing   Progress Slow progress, decreased activity tolerance   PT Frequency (3-5x/wk)   Recommendation   Recommendation Post acute IP rehab   PT - OK to Discharge Yes  (rehab)     Philip Pedraza PT

## 2018-11-15 NOTE — PLAN OF CARE
Problem: OCCUPATIONAL THERAPY ADULT  Goal: Performs self-care activities at highest level of function for planned discharge setting  See evaluation for individualized goals  Treatment Interventions: Functional transfer training, UE strengthening/ROM, Endurance training, Patient/family training, Continued evaluation, Activityengagement          See flowsheet documentation for full assessment, interventions and recommendations  Outcome: Progressing  Limitation: Decreased ADL status, Decreased cognition, Decreased endurance, Decreased high-level ADLs, Decreased self-care trans  Prognosis: Fair  Assessment: Pt is seen for skilled occupational therapy session 11/15/18 including interventions to: improve bed mobility, improve functional transfers, and increase endurance  Upon therapist entry, pt supine in bed and requesting to use the bedpan  Pt rolls to the R at [facility] for bedpan placement  Pt req increased assistance for functional transfers and functional mobility at this date, secondary to pain and fatigue  In comparison to previous session, pt demonstrating improvements in her ability to participate in LB dressing, donning R sock at [facility] and L sock with modA  Pt continues to function below her baseline level of performance, and will continue to benefit from skilled occupational therapy services while in the hospital to maximize functioning and independence in daily activities       OT Discharge Recommendation: Short Term Rehab  OT - OK to Discharge: Yes      Comments: Darío Patel, FELICITAS, OTR/L

## 2018-11-15 NOTE — OCCUPATIONAL THERAPY NOTE
[address] Progress Note     Patient Name: Maryuri Lema  Today's Date: 11/15/2018  Problem List  Patient Active Problem List   Diagnosis    Dizziness    Anxiety    [facility]    Epigastric discomfort    RLS (restless legs syndrome)    Arthritis of left hip    S/P total hip arthroplasty    Closed fracture of femur, neck (HCC)    Elevated alkaline phosphatase level    Hair loss    Hypothyroidism    Left hip pain    Osteoporosis    Primary localized osteoarthritis of left hip    RBBB    Hand arthritis    Screening for colon cancer    Carpal tunnel syndrome on right    Ambulatory dysfunction    Compression fracture of L3 lumbar vertebra, closed, initial encounter ([facility] )           11/15/18 1354   Restrictions/Precautions   Weight Bearing Precautions Per Order No   Braces or Orthoses LSO   Other Precautions Fall Risk;Pain   Pain Assessment   Pain Assessment 0-10   Pain Score 9   Pain Location Back   Pain Orientation Lower;Right   Pain Radiating Towards R LE   Pain Descriptors Stabbing   Clinical Progression Gradually worsening  (Pain increases during OOB transfers and functional mobility)   Effect of Pain on Daily Activities Pain in currently a barrier to participating in OOB transfers, functional mobility, ADLs and IADLs   ADL   LB Dressing Assistance 4  Minimal Assistance   LB Dressing Deficit Don/doff R sock; Don/doff L sock  ([facility] for R sock, modA for L sock)   LB Dressing Comments Req increased time for R sock, req modA for L sock   Toileting Assistance  3  Moderate Assistance   Toileting Deficit Use of bedpan/urinal setup; Increased time to complete;Setup   Toileting Comments Pt rolls to the R for bedpan placement   Bed Mobility   Rolling R 6  Modified independent   Additional items Increased time required   Supine to Sit 5  Supervision   Additional items Increased time required   Sit to Supine 3  Moderate assistance   Additional items Assist x 1;LE management; Increased time required;HOB elevated; Bedrails  (EOB>supine in bed)   Transfers   Sit to Stand 2  Maximal assistance   Additional items Assist x 1; Increased time required;Verbal cues  (initially modA, fades to maxA for 3rd and 4th transfer)   Stand to Sit 4  Minimal assistance   Additional items Assist x 1; Increased time required;Verbal cues   Stand pivot 3  Moderate assistance   Additional items Assist x 1; Increased time required   Functional Mobility   Functional Mobility 3  Moderate assistance   Additional Comments Pt ambulated ~5ft with rw with modA, fatigued and needed rest break   Additional items Rolling walker   Cognition   Overall Cognitive Status WFL   Arousal/Participation Alert; Cooperative   Attention Within functional limits   Orientation Level Oriented X4   Memory Within functional limits   Following Commands Follows one step commands without difficulty   Activity Tolerance   Activity Tolerance Patient limited by pain; Patient limited by fatigue   Medical Staff Made Aware Yes, MARYAM Coelho Pt is seen for skilled occupational therapy session 11/15/18 including interventions to: improve bed mobility, improve functional transfers, and increase endurance  Upon therapist entry, pt supine in bed and requesting to use the bedpan  Pt rolls to the R at [facility] for bedpan placement  Pt req increased assistance for functional transfers and functional mobility at this date, secondary to pain and fatigue  In comparison to previous session, pt demonstrating improvements in her ability to participate in LB dressing, donning R sock at [facility] and L sock with modA  Pt continues to function below her baseline level of performance, and will continue to benefit from skilled occupational therapy services while in the hospital to maximize functioning and independence in daily activities  Plan   Treatment Interventions Functional transfer training;UE strengthening/ROM; Endurance training;Patient/family training;Continued evaluation; Activityengagement   Goal Expiration Date 11/24/18   Treatment Day 1   OT Frequency 3-5x/wk   Recommendation   OT Discharge Recommendation Short Term Rehab   OT - OK to Discharge Yes   Barthel Index   Feeding 10   Bathing 0   Grooming Score 5   Dressing Score 5   Bladder Score 10   Bowels Score 10   Toilet Use Score 5   Transfers (Bed/Chair) Score 5   Mobility (Level Surface) Score 0   Stairs Score 0   Barthel Index Score 50     Cynthia Lindsay, FELICITAS, OTR/L

## 2018-11-15 NOTE — ASSESSMENT & PLAN NOTE
· See above plan for details  · Seen by Endocrinology for her osteoporosis  Given that she had a fracture while on Fosamax, she may benefit from anabolic therapy  She will need to follow up with Endocrinology as an outpatient      · Pain control PRN

## 2018-11-16 PROCEDURE — 97110 THERAPEUTIC EXERCISES: CPT

## 2018-11-16 PROCEDURE — 99232 SBSQ HOSP IP/OBS MODERATE 35: CPT | Performed by: PHYSICIAN ASSISTANT

## 2018-11-16 PROCEDURE — 97530 THERAPEUTIC ACTIVITIES: CPT

## 2018-11-16 PROCEDURE — 99231 SBSQ HOSP IP/OBS SF/LOW 25: CPT | Performed by: NEUROLOGICAL SURGERY

## 2018-11-16 RX ADMIN — DOCUSATE SODIUM 100 MG: 100 CAPSULE, LIQUID FILLED ORAL at 08:15

## 2018-11-16 RX ADMIN — GABAPENTIN 100 MG: 100 CAPSULE ORAL at 15:40

## 2018-11-16 RX ADMIN — ACETAMINOPHEN 975 MG: 325 TABLET, FILM COATED ORAL at 21:06

## 2018-11-16 RX ADMIN — FOLIC ACID 1000 MCG: 1 TABLET ORAL at 08:15

## 2018-11-16 RX ADMIN — OXYCODONE HYDROCHLORIDE 5 MG: 5 TABLET ORAL at 06:33

## 2018-11-16 RX ADMIN — ACETAMINOPHEN 975 MG: 325 TABLET, FILM COATED ORAL at 06:32

## 2018-11-16 RX ADMIN — VITAMIN D, TAB 1000IU (100/BT) 1000 UNITS: 25 TAB at 08:15

## 2018-11-16 RX ADMIN — HYDROMORPHONE HYDROCHLORIDE 0.25 MG: 1 INJECTION, SOLUTION INTRAMUSCULAR; INTRAVENOUS; SUBCUTANEOUS at 08:25

## 2018-11-16 RX ADMIN — DIAZEPAM 5 MG: 5 TABLET ORAL at 22:36

## 2018-11-16 RX ADMIN — PREDNISONE 10 MG: 10 TABLET ORAL at 08:15

## 2018-11-16 RX ADMIN — PSYLLIUM HUSK 1 PACKET: 3.4 POWDER ORAL at 08:14

## 2018-11-16 RX ADMIN — PSYLLIUM HUSK 1 PACKET: 3.4 POWDER ORAL at 15:39

## 2018-11-16 RX ADMIN — OXYCODONE HYDROCHLORIDE AND ACETAMINOPHEN 1000 MG: 500 TABLET ORAL at 08:17

## 2018-11-16 RX ADMIN — HYDROMORPHONE HYDROCHLORIDE 0.25 MG: 1 INJECTION, SOLUTION INTRAMUSCULAR; INTRAVENOUS; SUBCUTANEOUS at 14:47

## 2018-11-16 RX ADMIN — ENOXAPARIN SODIUM 40 MG: 40 INJECTION SUBCUTANEOUS at 08:14

## 2018-11-16 RX ADMIN — OXYCODONE HYDROCHLORIDE 5 MG: 5 TABLET ORAL at 11:39

## 2018-11-16 RX ADMIN — GABAPENTIN 100 MG: 100 CAPSULE ORAL at 08:15

## 2018-11-16 RX ADMIN — NORTRIPTYLINE HYDROCHLORIDE 35 MG: 10 CAPSULE ORAL at 21:09

## 2018-11-16 RX ADMIN — OXYCODONE HYDROCHLORIDE 5 MG: 5 TABLET ORAL at 01:36

## 2018-11-16 RX ADMIN — DOCUSATE SODIUM 100 MG: 100 CAPSULE, LIQUID FILLED ORAL at 17:17

## 2018-11-16 RX ADMIN — VITAMIN E CAP 400 UNIT 400 UNITS: 400 CAP at 08:16

## 2018-11-16 RX ADMIN — Medication 1 TABLET: at 08:14

## 2018-11-16 RX ADMIN — HYDROMORPHONE HYDROCHLORIDE 0.25 MG: 1 INJECTION, SOLUTION INTRAMUSCULAR; INTRAVENOUS; SUBCUTANEOUS at 21:07

## 2018-11-16 RX ADMIN — ACETAMINOPHEN 975 MG: 325 TABLET, FILM COATED ORAL at 14:48

## 2018-11-16 RX ADMIN — GABAPENTIN 100 MG: 100 CAPSULE ORAL at 21:06

## 2018-11-16 RX ADMIN — HYDROMORPHONE HYDROCHLORIDE 0.25 MG: 1 INJECTION, SOLUTION INTRAMUSCULAR; INTRAVENOUS; SUBCUTANEOUS at 03:07

## 2018-11-16 RX ADMIN — LEVOTHYROXINE SODIUM 50 MCG: 50 TABLET ORAL at 06:33

## 2018-11-16 NOTE — SOCIAL WORK
DC Planning follow up:     NAGELA Tsang advised that the patient is not yet medically cleared, pend MRI lumbar spine, still on IV pain meds and pend clearance from Neuro Surgery; poss dc in 1-2 days  CM met with the patient to discuss dc plan preferences; pt stated that she is still undecided on whether she wants IP rehab or home PT with family assisting with 24/7 care  The patient stated she needs to speak with her family tomorrow or Sunday to determine her choice  Cm advised ANGELA Al and RN [employer]

## 2018-11-16 NOTE — PROGRESS NOTES
Progress Note - Geetha Rivera 1945, 75 y o  female MRN: [id number]    Unit/Bed#: CW2 210-02 Encounter: [id number]    Primary Care Provider: Zane Alvarado MD   Date and time admitted to hospital: 11/12/2018 11:37 PM      * Ambulatory dysfunction   Assessment & Plan    · Likely multifactorial   · She had a fall two weeks ago at home with increasing pain since then  · Imaging shows L3 compression fracture  · S/p total hip arthroplasty, orthopedics consulted, do not feel her sx are related to her hip arthroplasty and more so likely related to L3 compression fracture and recommend neurosurgery consult  · Continue PRN pain medications  · Will obtain X-ray of right knee as she is complaining of knee pain as well since she fell, this is negative for acute osseous abnormality   · Neurosurgery consulted, appreciate input  Recommend LSO brace to be worn OOB for comfort and improve stability of spine  · Upright XR with brace shows compression deformity of the L3 vertebral bodies re-demonstrated with approximately 30% vertebral body height loss  · CT lumbar spine shows acute early subacute moderate L3 compression fracture involving the inferior endplate  Bony retropulsion of the posterior inferior margin of the vertebral body into the anterior epidural space with resulting canal stenosis and right-sided foraminal narrowing  Central disc herniations at multiple levels resulting in mild canal stenosis  · Of note, nursing reports they received call from radiology this morning regarding findings on CT lumbar spine of a coiled S shape object in the rectum  Unclear who called this morning, however this is not available in the official report from two days ago on 11/14, so called radiology to discuss - CT lumbar spine does not evaluate the rectum, and the patient does have an IUD in place (it seems likely this is a Lippes loop IUD) which was visualized on XR pelvis as well    Per discussion with radiologist it seems that likely what was visualized is in fact the IUD and this study would not have evaluated the rectum  · Confirmed with patient that she does have an IUD in place, she reports this was placed about 30-40 years ago and her GYN stated that if she did not have any issues with it that this could stay in place  · MRI lumbar spine pending, f/u results  · No surgical intervention is indicated at this time per Neurosurgery  · APS following, appreciate input, patient reports her pain is improved today though she is still having significant discomfort  · PT/OT recommending short-term rehab     Compression fracture of L3 lumbar vertebra, closed, initial encounter [facility])   Assessment & Plan    · See above plan for details  · Seen by Endocrinology for her osteoporosis  Given that she had a fracture while on Fosamax, she may benefit from anabolic therapy  She will need to follow up with Endocrinology as an outpatient  · Pain control PRN     Hypothyroidism   Assessment & Plan    · Continue thyroid medication  · TSH elevated, seen by Endocrinology, will increase Synthroid  · Repeat TSH in 4-6 weeks     S/P total hip arthroplasty   Assessment & Plan    · Patient denies hip pain at this time, seen by ortho and do not suspect her back pain is associated with her total hip arthroplasty   · F/u with ortho outpatient       VTE Pharmacologic Prophylaxis:   Pharmacologic: Enoxaparin (Lovenox)  Mechanical VTE Prophylaxis in Place: Yes    Patient Centered Rounds: I have performed bedside rounds with nursing staff today  Discussions with Specialists or Other Care Team Provider: RN, CM, radiology    Education and Discussions with Family / Patient: patient  Declined call to family    Time Spent for Care: 30 minutes  More than 50% of total time spent on counseling and coordination of care as described above      Current Length of Stay: 3 day(s)    Current Patient Status: Inpatient   Certification Statement: The patient will continue to require additional inpatient hospital stay due to intractable pain, needs safe discharge plan, needs MRI lumbar spine    Discharge Plan: needs safe d/c plan, needs MRI not stable for discharge today  Code Status: Level 1 - Full Code      Subjective:   Patient reports her pain is better controlled today, though she is still having significant discomfort in her back  She was able to wear the brace for about an hour yesterday sitting up  She denies sob, n/v, abdominal pain, cp, urinary or bowel issues  Objective:     Vitals:   Temp (24hrs), Av 9 °F (36 6 °C), Min:97 6 °F (36 4 °C), Max:98 1 °F (36 7 °C)    Temp:  [97 6 °F (36 4 °C)-98 1 °F (36 7 °C)] 98 1 °F (36 7 °C)  HR:  [63-75] 75  Resp:  [16-20] 18  BP: (102-112)/(52-56) 111/56  SpO2:  [97 %-100 %] 97 %  Body mass index is 17 72 kg/m²  Input and Output Summary (last 24 hours): Intake/Output Summary (Last 24 hours) at 18 1138  Last data filed at 18 0900   Gross per 24 hour   Intake              620 ml   Output              700 ml   Net              -80 ml       Physical Exam:     Physical Exam   Constitutional: She is oriented to person, place, and time  She appears well-developed and well-nourished  No distress  HENT:   Head: Normocephalic and atraumatic  Mouth/Throat: Oropharynx is clear and moist    Eyes: Pupils are equal, round, and reactive to light  EOM are normal  No scleral icterus  Neck: Normal range of motion  Neck supple  Cardiovascular: Normal rate, regular rhythm and normal heart sounds  No murmur heard  Pulmonary/Chest: Effort normal and breath sounds normal  No respiratory distress  She has no wheezes  She has no rales  She exhibits no tenderness  Abdominal: Soft  Bowel sounds are normal  She exhibits no distension  There is no tenderness  Musculoskeletal:   Limited ROM 2/2 pain   Neurological: She is alert and oriented to person, place, and time  No cranial nerve deficit     Skin: Skin is warm and dry  No rash noted  She is not diaphoretic  No erythema  Psychiatric: She has a normal mood and affect  Her behavior is normal    Vitals reviewed  Additional Data:     Labs:      Results from last 7 days  Lab Units 11/13/18  0152   WBC Thousand/uL 8 15   HEMOGLOBIN g/dL 11 4*   HEMATOCRIT % 36 3   PLATELETS Thousands/uL 364   NEUTROS PCT % 71   LYMPHS PCT % 18   MONOS PCT % 9   EOS PCT % 1       Results from last 7 days  Lab Units 11/13/18  0152   SODIUM mmol/L 137  138   POTASSIUM mmol/L 3 5  3 5   CHLORIDE mmol/L 103  104   CO2 mmol/L 26  28   BUN mg/dL 23  22   CREATININE mg/dL 0 60  0 62   ANION GAP mmol/L 8  6   CALCIUM mg/dL 8 4  9 0   ALBUMIN g/dL 3 2*   TOTAL BILIRUBIN mg/dL 0 31   ALK PHOS U/L 99   ALT U/L 23   AST U/L 15   GLUCOSE RANDOM mg/dL 78  83                           * I Have Reviewed All Lab Data Listed Above  * Additional Pertinent Lab Tests Reviewed:  All Labs Within Last 24 Hours Reviewed    Imaging:    Imaging Reports Reviewed Today Include: CT lumbar spine  Imaging Personally Reviewed by Myself Includes:  CT lumbar spine    Recent Cultures (last 7 days):           Last 24 Hours Medication List:     Current Facility-Administered Medications:  acetaminophen 975 mg Oral Q8H [address] Debi lA PA-C   aluminum-magnesium hydroxide-simethicone 30 mL Oral Q6H PRN Steve Amaral MD   vitamin C 1,000 mg Oral Daily Steve Amaral MD   cholecalciferol 1,000 Units Oral Daily Steve Amaral MD   diazepam 5 mg Oral Q12H PRN Steve Amaral MD   docusate sodium 100 mg Oral BID Steve Amaral MD   docusate sodium 100 mg Oral BID PRN Steve Amaral MD   enoxaparin 40 mg Subcutaneous Daily Steve Amaral MD   folic acid 0,960 mcg Oral Daily Steve Amaral MD   gabapentin 100 mg Oral TID Steve Amaral MD   HYDROmorphone 0 25 mg Intravenous Q4H PRN Steve Amaral MD   ibuprofen 400 mg Oral Q6H PRN Steve Amaral MD   levothyroxine 50 mcg Oral Early Morning Sherryll Castleman, DO multivitamin-minerals 1 tablet Oral Daily Abundio Lund MD   nortriptyline 35 mg Oral HS Debi Al PA-C   ondansetron 4 mg Intravenous Q6H PRN Abundio Lund MD   oxyCODONE 2 5 mg Oral Q4H PRN Abundio Lund MD   oxyCODONE 5 mg Oral Q4H PRN Tyshawn Guzman PA-C   predniSONE 10 mg Oral Daily Abundio Lund MD   psyllium 1 packet Oral TID Abundio Lund MD   vitamin E (tocopherol) 400 Units Oral Daily Abundio Lund MD        Today, Patient Was Seen By: THE [facility] TIFFANY BONILLA PA-C    ** Please Note: Dictation voice to text software may have been used in the creation of this document   **

## 2018-11-16 NOTE — PLAN OF CARE
Problem: Prexisting or High Potential for Compromised Skin Integrity  Goal: Skin integrity is maintained or improved  INTERVENTIONS:  - Identify patients at risk for skin breakdown  - Assess and monitor skin integrity  - Assess and monitor nutrition and hydration status  - Monitor labs (i e  albumin)  - Assess for incontinence   - Turn and reposition patient  - Assist with mobility/ambulation  - Relieve pressure over bony prominences  - Avoid friction and shearing  - Provide appropriate hygiene as needed including keeping skin clean and dry  - Evaluate need for skin moisturizer/barrier cream  - Collaborate with interdisciplinary team (i e  Nutrition, Rehabilitation, etc )   - Patient/family teaching   Outcome: Progressing      Problem: DISCHARGE PLANNING - CARE MANAGEMENT  Goal: Discharge to post-acute care or home with appropriate resources  INTERVENTIONS:  - Conduct assessment to determine patient/family and health care team treatment goals, and need for post-acute services based on payer coverage, community resources, and patient preferences, and barriers to discharge  - Address psychosocial, clinical, and financial barriers to discharge as identified in assessment in conjunction with the patient/family and health care team  - Arrange appropriate level of post-acute services according to patient's   needs and preference and payer coverage in collaboration with the physician and health care team  - Communicate with and update the patient/family, physician, and health care team regarding progress on the discharge plan  - Arrange appropriate transportation to post-acute venues    Admit Dx Fall L3 Compression Fx  Hx Polio, PVD, R BBB, Anxiety & Depression  Pt expressed understanding of her diagnoses and treatment regimen  DC plan - home with 24/7 vs IP rehab; pt undecided  Awaiting medical progression      Outcome: Progressing

## 2018-11-16 NOTE — PLAN OF CARE
Problem: OCCUPATIONAL THERAPY ADULT  Goal: Performs self-care activities at highest level of function for planned discharge setting  See evaluation for individualized goals  Treatment Interventions: Functional transfer training, UE strengthening/ROM, Endurance training, Patient/family training, Continued evaluation, Activityengagement  Equipment Recommended: Bedside commode, Tub seat with back       See flowsheet documentation for full assessment, interventions and recommendations  Outcome: Not Progressing  Limitation: Decreased ADL status, Decreased cognition, Decreased endurance, Decreased high-level ADLs, Decreased self-care trans  Prognosis: Fair  Assessment: Pt is seen for skilled occupational therapy session 11/16/18 including interventions to: increase b/l strength, improve functional transfers, improve functional mobility, and increase endurance  Upon therapist entry, pt supine in bed and agreeable to therapy  Pt reporting that her pain is worse than during yesterday's OT session  In comparison to previous session, pt requiring increased assistance with functional transfers and functional mobility today secondary to pain level  Pt is able to don/doff LSO at [place] while seated EOB  Discussed the benefits of going to STR upon d/c as she is a mod-maxA transfer and she lives with only her sister  Pt reports that she is not sure if her sister is strong enough to help her transfer and ambulate, stating "I guess we'll see"  Pt demonstrates motivation throughout session, despite 10/10 pain with activity  Pt ambulated to the end of the bed with rw and then required therapist to push her back to the [facility] in the chair due to pain and fatigue  OT to recommend STR upon d/c, as well as a BSC and shower chair with a back  Pt to continue to benefit from skilled occupational therapy while in the hospital to maximize functioning and independence in daily activities       OT Discharge Recommendation: Short Term Rehab  OT - OK to Discharge: Yes      Comments: FELICITAS Burgess, OTR/L

## 2018-11-16 NOTE — OCCUPATIONAL THERAPY NOTE
[address] Per Progress Note     Patient Name: Rachel Fleming  Today's Date: 11/16/2018  Problem List  Patient Active Problem List   Diagnosis    Dizziness    Anxiety    [facility]    Epigastric discomfort    RLS (restless legs syndrome)    Arthritis of left hip    S/P total hip arthroplasty    Closed fracture of femur, neck (HCC)    Elevated alkaline phosphatase level    Hair loss    Hypothyroidism    Left hip pain    Osteoporosis    Primary localized osteoarthritis of left hip    RBBB    Hand arthritis    Screening for colon cancer    Carpal tunnel syndrome on right    Ambulatory dysfunction    Compression fracture of L3 lumbar vertebra, closed, initial encounter ([facility] )        11/16/18 1411   Restrictions/Precautions   Weight Bearing Precautions Per Order No   Braces or Orthoses LSO   Other Precautions Fall Risk;Pain   Pain Assessment   Pain Assessment 0-10   Pain Score Worst Possible Pain   Pain Location Back;Leg   Pain Orientation Right   ADL   UB Bathing Assistance 6  Modified Independent   UB Bathing Deficit Increased time to complete  (LSO Brace)   Bed Mobility   Supine to Sit 3  Moderate assistance   Additional items Increased time required;HOB elevated   Sit to Supine 3  Moderate assistance   Additional items LE management; Increased time required   Transfers   Sit to Stand 2  Maximal assistance   Additional items Assist x 1; Increased time required   Stand to Sit 3  Moderate assistance   Additional items Assist x 1;Verbal cues; Increased time required   Therapeutic Excerise-Strength   UE Strength Yes   Right Upper Extremity- Strength   R Shoulder Flexion; Extension   R Elbow Elbow flexion;Elbow extension   R Weight/Reps/Sets 10x2   RUE Strength Comment increased pain with shoulder flexion/extension   Left Upper Extremity-Strength   L Shoulder Flexion; Extension   L Elbow Elbow flexion;Elbow extension   L Weights/Reps/Sets 10x2   LUE Strength Comment increased pain with shoulder flexion/extension   Cognition   Overall Cognitive Status WFL   Arousal/Participation Cooperative;Responsive; Alert   Attention Within functional limits   Orientation Level Oriented X4   Memory Within functional limits   Following Commands Follows one step commands without difficulty   Activity Tolerance   Activity Tolerance Patient limited by pain   Medical Staff Made Aware per RN Chet Gibson, pt appropriate for OT tx   Assessment   Assessment Pt is seen for skilled occupational therapy session 11/16/18 including interventions to: increase b/l strength, improve functional transfers, improve functional mobility, and increase endurance  Upon therapist entry, pt supine in bed and agreeable to therapy  Pt reporting that her pain is worse than during yesterday's OT session  In comparison to previous session, pt requiring increased assistance with functional transfers and functional mobility today secondary to pain level  Pt is able to don/doff LSO at [place] while seated EOB  Discussed the benefits of going to STR upon d/c as she is a mod-maxA transfer and she lives with only her sister  Pt reports that she is not sure if her sister is strong enough to help her transfer and ambulate, stating "I guess we'll see"  Pt demonstrates motivation throughout session, despite 10/10 pain with activity  Pt ambulated to the end of the bed with rw and then required therapist to push her back to the [facility] in the chair due to pain and fatigue  OT to recommend STR upon d/c, as well as a BSC and shower chair with a back  Pt to continue to benefit from skilled occupational therapy while in the hospital to maximize functioning and independence in daily activities  Plan   Treatment Interventions Functional transfer training;UE strengthening/ROM; Endurance training;Patient/family training;Continued evaluation; Activityengagement   OT Frequency 3-5x/wk   Recommendation   OT Discharge Recommendation Short Term Rehab   Equipment Recommended Bedside commode;Tub seat with back   OT - OK to Discharge Yes   Barthel Index   Feeding 10   Bathing 0   Grooming Score 5   Dressing Score 5   Bladder Score 10   Bowels Score 10   Toilet Use Score 5   Transfers (Bed/Chair) Score 5   Mobility (Level Surface) Score 0   Stairs Score 0   Barthel Index Score 50     Cynthia Lindsay, MOT, OTR/L

## 2018-11-16 NOTE — ASSESSMENT & PLAN NOTE
· Likely multifactorial   · She had a fall two weeks ago at home with increasing pain since then  · Imaging shows L3 compression fracture  · S/p total hip arthroplasty, orthopedics consulted, do not feel her sx are related to her hip arthroplasty and more so likely related to L3 compression fracture and recommend neurosurgery consult  · Continue PRN pain medications  · Will obtain X-ray of right knee as she is complaining of knee pain as well since she fell, this is negative for acute osseous abnormality   · Neurosurgery consulted, appreciate input  Recommend LSO brace to be worn OOB for comfort and improve stability of spine  · Upright XR with brace shows compression deformity of the L3 vertebral bodies re-demonstrated with approximately 30% vertebral body height loss  · CT lumbar spine shows acute early subacute moderate L3 compression fracture involving the inferior endplate  Bony retropulsion of the posterior inferior margin of the vertebral body into the anterior epidural space with resulting canal stenosis and right-sided foraminal narrowing  Central disc herniations at multiple levels resulting in mild canal stenosis  · Of note, nursing reports they received call from radiology this morning regarding findings on CT lumbar spine of a coiled S shape object in the rectum  Unclear who called this morning, however this is not available in the official report from two days ago on 11/14, so called radiology to discuss - CT lumbar spine does not evaluate the rectum, and the patient does have an IUD in place (it seems likely this is a Lippes loop IUD) which was visualized on XR pelvis as well  Per discussion with radiologist it seems that likely what was visualized is in fact the IUD and this study would not have evaluated the rectum    · Confirmed with patient that she does have an IUD in place, she reports this was placed about 30-40 years ago and her GYN stated that if she did not have any issues with it that this could stay in place    · MRI lumbar spine pending, f/u results  · No surgical intervention is indicated at this time per Neurosurgery  · APS following, appreciate input, patient reports her pain is improved today though she is still having significant discomfort  · PT/OT recommending short-term rehab

## 2018-11-16 NOTE — PROGRESS NOTES
Progress Note - Neurosurgery   Geetha Colon 75 y o  female MRN: [id number]  Unit/Bed#: CW2 210-02 Encounter: [id number]    Assessment:  1  L3 compression fracture with mild retropulsion  2  Status post fall  3  Ambulatory dysfunction  4  Arthritis the left hip  5  Hypothyroidism  6  Osteoporosis  7  Peripheral vascular disease  8  Restless leg syndrome - takes motrin daily  9  History total hip arthroplasty     Plan:  · Exam:  Alert and oriented x 3, ORNELAS with weakness in BLE, normal to LT and PP, reflexes 3+ and brisk, no clonus noted, + bilateral dominguez, DST intact, JPS 3/3, midline and paraspinal tenderness in low back  · Imaging reviewed personally and with attending  Results are as follows:  ? CT spine lumbar without contrast (11/14/2018): Acute or early subacute moderate L3 compression fracture involving the inferior endplate  Bony retropulsion of the posterior inferior margin of the vertebral body into the anterior epidural space with resulting canal stenosis and right-sided foraminal narrowing  Central disc herniations at multiple levels resulting in mild canal stenosis  ? X-ray spine lumbar 11/14/2018:  Compression deformity of the L3 vertebral body is re-demonstrated with approximately 30% vertebral body height loss  ? XR sacrum and coccyx (11/13/2018): Compression fracture of L3, with approximately 25-33% loss of height  ? XR lumbar spine (11/13/2018): Compression fracture of L3 that is new from 2014 study  Mild retropulsion but no high-grade spinal canal narrowing is seen at the level  Spinal alignment is maintained  ? XR pelvis (11/13/2018): Compression fracture of L3, with approximately 25-33% loss of height    · LSO brace to be worn OOB and HOB < 45 degrees  · MRI lumbar ordered and pending to evaluate for possible nerve compression  · Hold AC / AP - patient admits to daily use of motrin at home  · Will order bladder scan due to patient reporting urinary retention symptoms  · Medical management per primary team  ? Recommend APS evaluate patient due to persistent pain on current pain regimen  · DVT ppx: SCDs and lovenox  · Mobilize as tolerated with assistance  ? PT / OT  · Neurosurgery will continue to follow pending MRI lumbar results  No surgical intervention is recommended at this time; pending MRI results for further treatment options  Patient will continue to wear LSO brace while out of bed and head of bed greater than 45°  Please call with questions or concerns  Subjective/Objective   Chief Complaint: "I am not doing well " / L3 compression fracture with mild retropulsion    Subjective: Patient continues to complain of 8/10 back and right leg pain  If she had to choose, she states she would want the leg pain to be fixed over the back pain because she cannot use the leg as it is right now  She tried to walk in the hallway with the brace on and states her right leg felt like it was giving out on her  She states the LSO brace helps back pain a little  She reports feeling as though she cannot full empty bladder today  She denies headaches, dizziness, CP, SOB, abdominal pain, N/V  Patient has a hx of polio as a child, which affected the left leg  She has had 3 surgeries on the left leg; once to repair a broken hip, second was revision which failed, third was a successful revision  Left leg has always been weaker and she ambulates with a walker at home  Objective: laying in bed, NAD  I/O       11/14 0701 - 11/15 0700 11/15 0701 - 11/16 0700 11/16 0701 - 11/17 0700    P  O  680 320 180    Total Intake(mL/kg) 680 (15 5) 320 (7 3) 180 (4 1)    Urine (mL/kg/hr) 775 (0 7) 400 (0 4) 100 (0 9)    Total Output 775 400 100    Net -95 -80 +80                 Invasive Devices     Peripheral Intravenous Line            Peripheral IV 11/15/18 Left Forearm 1 day                Physical Exam:  Vitals: Blood pressure 111/56, pulse 75, temperature 98 1 °F (36 7 °C), temperature source Oral, resp  rate 18, height 5' 2" (1 575 m), weight 44 kg (96 lb 14 4 oz), SpO2 97 %, currently breastfeeding  ,Body mass index is 17 72 kg/m²  General appearance: alert, appears stated age, cooperative and no distress  Head: Normocephalic, without obvious abnormality, atraumatic  Eyes: EOMI, PERRL  Neck: supple, symmetrical, trachea midline and NT  Back: no kyphosis present, TTP midline low back  Lungs: non labored breathing  Heart: regular heart rate  Neurologic:   Mental status: Alert, oriented x 3, thought content appropriate  Cranial nerves: grossly intact (Cranial nerves II-XII)  Sensory: normal to LT and PP, DST intact, JPS 3/3  Motor: moving all extremities with weakness in the BLE  Strength 5/5 except   BLE: 3/5 HF/HE/KE/KF  Reflexes: 3+ and symmetric, +bilateral dominguez, no clonus  Coordination: finger to nose normal bilaterally, no drift bilaterally    Lab Results:    Results from last 7 days  Lab Units 11/13/18  0152   WBC Thousand/uL 8 15   HEMOGLOBIN g/dL 11 4*   HEMATOCRIT % 36 3   PLATELETS Thousands/uL 364   NEUTROS PCT % 71   MONOS PCT % 9       Results from last 7 days  Lab Units 11/13/18  0152   POTASSIUM mmol/L 3 5  3 5   CHLORIDE mmol/L 103  104   CO2 mmol/L 26  28   BUN mg/dL 23  22   CREATININE mg/dL 0 60  0 62   CALCIUM mg/dL 8 4  9 0   ALK PHOS U/L 99   ALT U/L 23   AST U/L 15                 No results found for: TROPONINT  ABG:No results found for: PHART, XTA9ZQF, PO2ART, EGM5VCD, D7MPVZLR, BEART, SOURCE    Imaging Studies: I have personally reviewed pertinent reports  and I have personally reviewed pertinent films in PACS    Xr Spine Lumbar 2 Or 3 Views Injury    Result Date: 11/14/2018  Impression: Compression deformity of the L3 vertebral body is redemonstrated with approximately 30% vertebral body height loss   Workstation performed: [id number]     Xr Lumbar Spine 2 Or 3 Views    Result Date: 11/13/2018  Impression: Compression fracture of L3 as described, age indeterminant but new from the 2014 exam   Correlation with the patient's symptoms recommended  Mild retropulsion but no high-grade spinal canal narrowing is seen at this level  Spinal alignment is maintained  Visualized bones otherwise appear intact  Other findings as above  Workstation performed: [id number]     Xr Sacrum And Coccyx    Result Date: 11/13/2018  Impression: Compression fracture of L3, with approximately 25-33% loss of height; age indeterminate, better seen on the dedicated lumbar spine views  Visualized bones otherwise appear intact  Other findings as above  Workstation performed: [id number]     Xr Knee 4+ Vw Right Injury    Result Date: 11/13/2018  Impression: No acute osseous abnormality  Workstation performed: [id number]     Ct Spine Lumbar Wo Contrast    Result Date: 11/14/2018  Impression: Acute early subacute moderate L3 compression fracture involving the inferior endplate  Bony retropulsion of the posterior inferior margin of the vertebral body into the anterior epidural space with resulting canal stenosis and right-sided foraminal narrowing  Central disc herniations at multiple levels resulting in mild canal stenosis  The study was marked in [facility] for immediate notification  Workstation performed: [id number]     Xr Pelvis Ap Only 1 Or 2 Vw    Result Date: 11/13/2018  Impression: Compression fracture of L3, with approximately 25-33% loss of height; age indeterminate, better seen on the dedicated lumbar spine views  Visualized bones otherwise appear intact  Other findings as above  Workstation performed: [id number]     EKG, Pathology, and Other Studies: I have personally reviewed pertinent reports        VTE Pharmacologic Prophylaxis: Enoxaparin (Lovenox)    VTE Mechanical Prophylaxis: sequential compression device

## 2018-11-17 LAB
25(OH)D3 SERPL-MCNC: 45 NG/ML (ref 30–100)
ALBUMIN SERPL BCP-MCNC: 2.9 G/DL (ref 3.5–5)
ALP SERPL-CCNC: 97 U/L (ref 46–116)
ALT SERPL W P-5'-P-CCNC: 22 U/L (ref 12–78)
ANION GAP SERPL CALCULATED.3IONS-SCNC: 4 MMOL/L (ref 4–13)
AST SERPL W P-5'-P-CCNC: 14 U/L (ref 5–45)
BILIRUB SERPL-MCNC: 0.2 MG/DL (ref 0.2–1)
BUN SERPL-MCNC: 17 MG/DL (ref 5–25)
CALCIUM SERPL-MCNC: 8.4 MG/DL (ref 8.3–10.1)
CHLORIDE SERPL-SCNC: 102 MMOL/L (ref 100–108)
CO2 SERPL-SCNC: 32 MMOL/L (ref 21–32)
CREAT SERPL-MCNC: 0.49 MG/DL (ref 0.6–1.3)
GFR SERPL CREATININE-BSD FRML MDRD: 96 ML/MIN/1.73SQ M
GLUCOSE SERPL-MCNC: 78 MG/DL (ref 65–140)
POTASSIUM SERPL-SCNC: 3.9 MMOL/L (ref 3.5–5.3)
PROT SERPL-MCNC: 6.3 G/DL (ref 6.4–8.2)
SODIUM SERPL-SCNC: 138 MMOL/L (ref 136–145)

## 2018-11-17 PROCEDURE — 84165 PROTEIN E-PHORESIS SERUM: CPT | Performed by: INTERNAL MEDICINE

## 2018-11-17 PROCEDURE — 80053 COMPREHEN METABOLIC PANEL: CPT | Performed by: INTERNAL MEDICINE

## 2018-11-17 PROCEDURE — 99231 SBSQ HOSP IP/OBS SF/LOW 25: CPT | Performed by: NEUROLOGICAL SURGERY

## 2018-11-17 PROCEDURE — 82306 VITAMIN D 25 HYDROXY: CPT | Performed by: INTERNAL MEDICINE

## 2018-11-17 PROCEDURE — 99232 SBSQ HOSP IP/OBS MODERATE 35: CPT | Performed by: INTERNAL MEDICINE

## 2018-11-17 RX ADMIN — LEVOTHYROXINE SODIUM 50 MCG: 50 TABLET ORAL at 05:50

## 2018-11-17 RX ADMIN — ACETAMINOPHEN 975 MG: 325 TABLET, FILM COATED ORAL at 14:45

## 2018-11-17 RX ADMIN — ACETAMINOPHEN 975 MG: 325 TABLET, FILM COATED ORAL at 21:06

## 2018-11-17 RX ADMIN — OXYCODONE HYDROCHLORIDE 5 MG: 5 TABLET ORAL at 00:16

## 2018-11-17 RX ADMIN — FOLIC ACID 1000 MCG: 1 TABLET ORAL at 09:37

## 2018-11-17 RX ADMIN — OXYCODONE HYDROCHLORIDE AND ACETAMINOPHEN 1000 MG: 500 TABLET ORAL at 09:37

## 2018-11-17 RX ADMIN — DIAZEPAM 5 MG: 5 TABLET ORAL at 21:06

## 2018-11-17 RX ADMIN — OXYCODONE HYDROCHLORIDE 5 MG: 5 TABLET ORAL at 11:08

## 2018-11-17 RX ADMIN — GABAPENTIN 100 MG: 100 CAPSULE ORAL at 17:29

## 2018-11-17 RX ADMIN — Medication 1 TABLET: at 09:36

## 2018-11-17 RX ADMIN — NORTRIPTYLINE HYDROCHLORIDE 35 MG: 10 CAPSULE ORAL at 21:06

## 2018-11-17 RX ADMIN — ACETAMINOPHEN 975 MG: 325 TABLET, FILM COATED ORAL at 05:50

## 2018-11-17 RX ADMIN — GABAPENTIN 100 MG: 100 CAPSULE ORAL at 09:37

## 2018-11-17 RX ADMIN — PSYLLIUM HUSK 1 PACKET: 3.4 POWDER ORAL at 09:36

## 2018-11-17 RX ADMIN — VITAMIN D, TAB 1000IU (100/BT) 1000 UNITS: 25 TAB at 09:36

## 2018-11-17 RX ADMIN — DOCUSATE SODIUM 100 MG: 100 CAPSULE, LIQUID FILLED ORAL at 17:29

## 2018-11-17 RX ADMIN — HYDROMORPHONE HYDROCHLORIDE 0.25 MG: 1 INJECTION, SOLUTION INTRAMUSCULAR; INTRAVENOUS; SUBCUTANEOUS at 03:36

## 2018-11-17 RX ADMIN — PREDNISONE 10 MG: 10 TABLET ORAL at 09:37

## 2018-11-17 RX ADMIN — PSYLLIUM HUSK 1 PACKET: 3.4 POWDER ORAL at 17:29

## 2018-11-17 RX ADMIN — HYDROMORPHONE HYDROCHLORIDE 0.25 MG: 1 INJECTION, SOLUTION INTRAMUSCULAR; INTRAVENOUS; SUBCUTANEOUS at 20:06

## 2018-11-17 RX ADMIN — ENOXAPARIN SODIUM 40 MG: 40 INJECTION SUBCUTANEOUS at 09:38

## 2018-11-17 RX ADMIN — IBUPROFEN 400 MG: 400 TABLET ORAL at 23:49

## 2018-11-17 RX ADMIN — GABAPENTIN 100 MG: 100 CAPSULE ORAL at 20:06

## 2018-11-17 RX ADMIN — VITAMIN E CAP 400 UNIT 400 UNITS: 400 CAP at 09:37

## 2018-11-17 RX ADMIN — DOCUSATE SODIUM 100 MG: 100 CAPSULE, LIQUID FILLED ORAL at 09:37

## 2018-11-17 RX ADMIN — OXYCODONE HYDROCHLORIDE 5 MG: 5 TABLET ORAL at 15:46

## 2018-11-17 NOTE — PROGRESS NOTES
Progress Note - Neurosurgery   Geetha Colon 75 y o  female MRN: [id number]  Unit/Bed#: CW2 210-02 Encounter: [id number]    Assessment:  1  L3 compression fracture with mild retropulsion  2  Status post fall  3  Ambulatory dysfunction  4  Arthritis the left hip  5  Hypothyroidism  6  Osteoporosis  7  Peripheral vascular disease  8  Restless leg syndrome - takes motrin daily  9  History total hip arthroplasty      Plan:  -upright x-rays okay  -MRI was ordered due to right lower extremity radiculopathy  -maintain lumbar brace  -okay for physical therapy and ambulation    Subjective/Objective     Complaining of pain radiating down her right leg  No bowel or bladder symptoms  No numbness or significant weakness in her lower extremity    Intake/Output       11/17/18 0701 - 11/18/18 0700      [id number] Total       Intake    P O   100  -- 100    Total Intake 100 -- 100       Output    Urine  600  -- 600    Urine 600 -- 600    Total Output 600 -- 600       Net I/O     -500 -- -500          Invasive Devices     Peripheral Intravenous Line            Peripheral IV 11/15/18 Left Forearm 2 days                Physical Exam:    Vitals: Blood pressure 117/56, pulse 72, temperature 98 °F (36 7 °C), temperature source Oral, resp  rate 18, height 5' 2" (1 575 m), weight 45 4 kg (100 lb 1 4 oz), SpO2 97 %, currently breastfeeding  ,Body mass index is 18 31 kg/m²  Awake and alert  LS 0 brace in place  Moves all extremities  Lower extremity dorsiflexion intact  Proximal strength 4+/ 5 on the right  Sensation intact  Lungs clear bilaterally  Heart regular rate  Abdomen soft    Lab Results: I have personally reviewed pertinent results  Imaging Studies: I have personally reviewed pertinent reports          VTE Pharmacologic Prophylaxis: Enoxaparin (Lovenox)    VTE Mechanical Prophylaxis: sequential compression device

## 2018-11-17 NOTE — PROGRESS NOTES
Progress Note - Geetha Rivera 1945, 75 y o  female MRN: [id number]    Unit/Bed#: CW2 210-02 Encounter: [id number]    Primary Care Provider: Asuncion Pan MD   Date and time admitted to hospital: 11/12/2018 11:37 PM  * Ambulatory dysfunction   Assessment & Plan    · Likely multifactorial   · She had a fall two weeks ago at home with increasing pain since then  · Imaging shows L3 compression fracture  · S/p total hip arthroplasty, orthopedics consulted, do not feel her sx are related to her hip arthroplasty and more so likely related to L3 compression fracture and recommended neurosurgery consult  · R knee xray negative  · Upright XR with brace shows compression deformity of the L3 vertebral bodies re-demonstrated with approximately 30% vertebral body height loss  · CT lumbar spine shows acute early subacute moderate L3 compression fracture involving the inferior endplate  Bony retropulsion of the posterior inferior margin of the vertebral body into the anterior epidural space with resulting canal stenosis and right-sided foraminal narrowing  Central disc herniations at multiple levels resulting in mild canal stenosis  · Reports of abnormal object noted-patient does have an IUD in place (it seems likely this is a Lippes loop IUD) which was visualized on XR pelvis as well  Per prior provider's d/w radiologist it seems that this is likely what was visualized   · Confirmed with patient that she does have an IUD in place, she reports this was placed about 30-40 years ago and her GYN stated that if she did not have any issues with it that this could stay in place  · MRI lumbar spine pending, f/u results  · No surgical intervention is anticipated at this time per Neurosurgery  Recommend LSO brace to be worn OOB for comfort and improve stability of spine      · APS following, appreciate input for pain control regimen, has been difficult  · PT/OT recommending short-term rehab, patient unsure if she will do that at this time     Compression fracture of L3 lumbar vertebra, closed, initial encounter [facility])   Assessment & Plan    · See above plan for details  · Seen by Endocrinology for her osteoporosis  Given that she had a fracture while on Fosamax, she may benefit from anabolic therapy  She will need to follow up with Endocrinology as an outpatient  · Pain control PRN per recommendations by acute pain service       Hypothyroidism   Assessment & Plan    · Continue thyroid medication  · TSH elevated, seen by Endocrinology, synthroid increased   · Repeat TSH in 4-6 weeks     S/P total hip arthroplasty   Assessment & Plan    · Patient denies hip pain at this time, seen by ortho and do not suspect her back pain is associated with her total hip arthroplasty   · F/u with ortho outpatient       VTE Pharmacologic Prophylaxis:   Pharmacologic: Enoxaparin (Lovenox)  Mechanical VTE Prophylaxis in Place: Yes    Patient Centered Rounds: I have performed bedside rounds with nursing staff today  Discussions with Specialists or Other Care Team Provider: appreciate APS and neurosurgery input  Education and Discussions with Family / Patient: patient  Time Spent for Care: 30 minutes  More than 50% of total time spent on counseling and coordination of care as described above  Current Length of Stay: 4 day(s)    Current Patient Status: Inpatient   Certification Statement: The patient will continue to require additional inpatient hospital stay due to pain control, MRI L spine    Discharge Plan: not yet stable  Pending pain control and MRI L spine  Also rehab is currently being recommended however patient unsure if she will go  Code Status: Level 1 - Full Code      Subjective:   Patient reports pain is still pretty bad this morning  It is primarily on her right lower back radiating down into her leg  She denies any paresthesias or weakness in her right leg  No bowel or bladder issues  No fevers or chills       Objective: Vitals:   Temp (24hrs), Av 9 °F (36 6 °C), Min:97 7 °F (36 5 °C), Max:98 °F (36 7 °C)    Temp:  [97 7 °F (36 5 °C)-98 °F (36 7 °C)] 98 °F (36 7 °C)  HR:  [64-79] 72  Resp:  [18] 18  BP: (115-119)/(55-61) 117/56  SpO2:  [95 %-99 %] 97 %  Body mass index is 18 31 kg/m²  Input and Output Summary (last 24 hours): Intake/Output Summary (Last 24 hours) at 18 0940  Last data filed at 18 0855   Gross per 24 hour   Intake              800 ml   Output             1600 ml   Net             -800 ml       Physical Exam:     Physical Exam   Constitutional: She is oriented to person, place, and time  She appears cachectic  No distress  Frail    Cardiovascular: Normal rate and regular rhythm  Pulmonary/Chest: Effort normal and breath sounds normal    Limited exam secondary to brace    Abdominal: Bowel sounds are normal    Limited exam   Musculoskeletal: She exhibits no edema  Neurological: She is alert and oriented to person, place, and time  Brace in place  Limited ROM RLE 2/2 pain   Skin: Skin is warm and dry  She is not diaphoretic  Psychiatric: She has a normal mood and affect  Nursing note and vitals reviewed  Additional Data:     Labs:      Results from last 7 days  Lab Units 18  0152   WBC Thousand/uL 8 15   HEMOGLOBIN g/dL 11 4*   HEMATOCRIT % 36 3   PLATELETS Thousands/uL 364   NEUTROS PCT % 71   LYMPHS PCT % 18   MONOS PCT % 9   EOS PCT % 1       Results from last 7 days  Lab Units 18  0434   POTASSIUM mmol/L 3 9   CHLORIDE mmol/L 102   CO2 mmol/L 32   BUN mg/dL 17   CREATININE mg/dL 0 49*   CALCIUM mg/dL 8 4   ALK PHOS U/L 97   ALT U/L 22   AST U/L 14           * I Have Reviewed All Lab Data Listed Above  * Additional Pertinent Lab Tests Reviewed:  All Labs Within Last 24 Hours Reviewed    Imaging:    Imaging Reports Reviewed Today Include: all  Imaging Personally Reviewed by Myself Includes:  none    Recent Cultures (last 7 days):           Last 24 Hours Medication List:     Current Facility-Administered Medications:  acetaminophen 975 mg Oral Q8H [address] Debi Al PA-C   aluminum-magnesium hydroxide-simethicone 30 mL Oral Q6H PRN Emely Bailey MD   vitamin C 1,000 mg Oral Daily Emely Bailey MD   cholecalciferol 1,000 Units Oral Daily Emely Bailey MD   diazepam 5 mg Oral Q12H PRN Emely Bailey MD   docusate sodium 100 mg Oral BID Emely Bailey MD   docusate sodium 100 mg Oral BID PRN Emely Bailey MD   enoxaparin 40 mg Subcutaneous Daily Emely Bailey MD   folic acid 7,098 mcg Oral Daily Emely Bailey MD   gabapentin 100 mg Oral TID Emely Bailey MD   HYDROmorphone 0 25 mg Intravenous Q4H PRN Emely Bailey MD   ibuprofen 400 mg Oral Q6H PRN Emely Bailey MD   levothyroxine 50 mcg Oral Early Morning Scott Fernandez DO   multivitamin-minerals 1 tablet Oral Daily Emely Bailey MD   nortriptyline 35 mg Oral HS Debi Al PA-C   ondansetron 4 mg Intravenous Q6H PRN Emely Bailey MD   oxyCODONE 2 5 mg Oral Q4H PRN Emely Bailey MD   oxyCODONE 5 mg Oral Q4H PRN Hema Brewster PA-C   predniSONE 10 mg Oral Daily Emely Bailey MD   psyllium 1 packet Oral TID Emely Bailey MD   vitamin E (tocopherol) 400 Units Oral Daily Emely Bailey MD        Today, Patient Was Seen By: Vane Wheeler PA-C    ** Please Note: Dictation voice to text software may have been used in the creation of this document   **

## 2018-11-17 NOTE — ASSESSMENT & PLAN NOTE
· Likely multifactorial   · She had a fall two weeks ago at home with increasing pain since then  · Imaging shows L3 compression fracture  · S/p total hip arthroplasty, orthopedics consulted, do not feel her sx are related to her hip arthroplasty and more so likely related to L3 compression fracture and recommended neurosurgery consult  · R knee xray negative  · Upright XR with brace shows compression deformity of the L3 vertebral bodies re-demonstrated with approximately 30% vertebral body height loss  · CT lumbar spine shows acute early subacute moderate L3 compression fracture involving the inferior endplate  Bony retropulsion of the posterior inferior margin of the vertebral body into the anterior epidural space with resulting canal stenosis and right-sided foraminal narrowing  Central disc herniations at multiple levels resulting in mild canal stenosis  · Reports of abnormal object noted-patient does have an IUD in place (it seems likely this is a Lippes loop IUD) which was visualized on XR pelvis as well  Per prior provider's d/w radiologist it seems that this is likely what was visualized   · Confirmed with patient that she does have an IUD in place, she reports this was placed about 30-40 years ago and her GYN stated that if she did not have any issues with it that this could stay in place  · MRI lumbar spine pending, f/u results  · No surgical intervention is anticipated at this time per Neurosurgery  Recommend LSO brace to be worn OOB for comfort and improve stability of spine      · APS following, appreciate input for pain control regimen, has been difficult  · PT/OT recommending short-term rehab, patient unsure if she will do that at this time

## 2018-11-17 NOTE — ASSESSMENT & PLAN NOTE
· See above plan for details  · Seen by Endocrinology for her osteoporosis  Given that she had a fracture while on Fosamax, she may benefit from anabolic therapy  She will need to follow up with Endocrinology as an outpatient      · Pain control PRN per recommendations by acute pain service

## 2018-11-17 NOTE — ASSESSMENT & PLAN NOTE
· Continue thyroid medication    · TSH elevated, seen by Endocrinology, synthroid increased   · Repeat TSH in 4-6 weeks

## 2018-11-18 ENCOUNTER — APPOINTMENT (INPATIENT)
Dept: RADIOLOGY | Facility: HOSPITAL | Age: 73
DRG: 544 | End: 2018-11-18
Payer: MEDICARE

## 2018-11-18 PROCEDURE — 99231 SBSQ HOSP IP/OBS SF/LOW 25: CPT | Performed by: NEUROLOGICAL SURGERY

## 2018-11-18 PROCEDURE — 72148 MRI LUMBAR SPINE W/O DYE: CPT

## 2018-11-18 PROCEDURE — 99232 SBSQ HOSP IP/OBS MODERATE 35: CPT | Performed by: INTERNAL MEDICINE

## 2018-11-18 RX ADMIN — DIAZEPAM 5 MG: 5 TABLET ORAL at 21:10

## 2018-11-18 RX ADMIN — FOLIC ACID 1000 MCG: 1 TABLET ORAL at 09:22

## 2018-11-18 RX ADMIN — GABAPENTIN 100 MG: 100 CAPSULE ORAL at 16:31

## 2018-11-18 RX ADMIN — OXYCODONE HYDROCHLORIDE 5 MG: 5 TABLET ORAL at 04:34

## 2018-11-18 RX ADMIN — ACETAMINOPHEN 975 MG: 325 TABLET, FILM COATED ORAL at 05:24

## 2018-11-18 RX ADMIN — OXYCODONE HYDROCHLORIDE 5 MG: 5 TABLET ORAL at 15:51

## 2018-11-18 RX ADMIN — OXYCODONE HYDROCHLORIDE 5 MG: 5 TABLET ORAL at 09:21

## 2018-11-18 RX ADMIN — OXYCODONE HYDROCHLORIDE AND ACETAMINOPHEN 1000 MG: 500 TABLET ORAL at 09:23

## 2018-11-18 RX ADMIN — VITAMIN E CAP 400 UNIT 400 UNITS: 400 CAP at 09:23

## 2018-11-18 RX ADMIN — LEVOTHYROXINE SODIUM 50 MCG: 50 TABLET ORAL at 05:24

## 2018-11-18 RX ADMIN — PSYLLIUM HUSK 1 PACKET: 3.4 POWDER ORAL at 09:20

## 2018-11-18 RX ADMIN — IBUPROFEN 400 MG: 400 TABLET ORAL at 11:49

## 2018-11-18 RX ADMIN — GABAPENTIN 100 MG: 100 CAPSULE ORAL at 09:22

## 2018-11-18 RX ADMIN — Medication 1 TABLET: at 09:21

## 2018-11-18 RX ADMIN — NORTRIPTYLINE HYDROCHLORIDE 35 MG: 10 CAPSULE ORAL at 21:11

## 2018-11-18 RX ADMIN — PREDNISONE 10 MG: 10 TABLET ORAL at 09:21

## 2018-11-18 RX ADMIN — ACETAMINOPHEN 975 MG: 325 TABLET, FILM COATED ORAL at 13:32

## 2018-11-18 RX ADMIN — OXYCODONE HYDROCHLORIDE 5 MG: 5 TABLET ORAL at 23:40

## 2018-11-18 RX ADMIN — HYDROMORPHONE HYDROCHLORIDE 0.25 MG: 1 INJECTION, SOLUTION INTRAMUSCULAR; INTRAVENOUS; SUBCUTANEOUS at 18:01

## 2018-11-18 RX ADMIN — HYDROMORPHONE HYDROCHLORIDE 0.25 MG: 1 INJECTION, SOLUTION INTRAMUSCULAR; INTRAVENOUS; SUBCUTANEOUS at 21:11

## 2018-11-18 RX ADMIN — DOCUSATE SODIUM 100 MG: 100 CAPSULE, LIQUID FILLED ORAL at 09:21

## 2018-11-18 RX ADMIN — GABAPENTIN 100 MG: 100 CAPSULE ORAL at 21:10

## 2018-11-18 RX ADMIN — VITAMIN D, TAB 1000IU (100/BT) 1000 UNITS: 25 TAB at 09:23

## 2018-11-18 RX ADMIN — ENOXAPARIN SODIUM 40 MG: 40 INJECTION SUBCUTANEOUS at 09:24

## 2018-11-18 RX ADMIN — ACETAMINOPHEN 975 MG: 325 TABLET, FILM COATED ORAL at 21:10

## 2018-11-18 NOTE — PROGRESS NOTES
Progress Note - Geetha Rivera 1945, 75 y o  female MRN: [id number]    Unit/Bed#: CW2 210-02 Encounter: [id number]    Primary Care Provider: Verónica Lazo MD   Date and time admitted to hospital: 11/12/2018 11:37 PM    * Ambulatory dysfunction   Assessment & Plan    · Likely multifactorial   · She had a fall two weeks ago at home with increasing pain since then  · Imaging shows L3 compression fracture  · S/p total hip arthroplasty, orthopedics consulted, do not feel her sx are related to her hip arthroplasty and more so likely related to L3 compression fracture and recommended neurosurgery consult  · R knee xray negative  · Upright XR with brace shows compression deformity of the L3 vertebral bodies re-demonstrated with approximately 30% vertebral body height loss  · CT lumbar spine shows acute early subacute moderate L3 compression fracture involving the inferior endplate  Bony retropulsion of the posterior inferior margin of the vertebral body into the anterior epidural space with resulting canal stenosis and right-sided foraminal narrowing  Central disc herniations at multiple levels resulting in mild canal stenosis  · Reports of abnormal object noted-patient does have an IUD in place (it seems likely this is a Lippes loop IUD) which was visualized on XR pelvis as well  Per prior provider's d/w radiologist it seems that this is likely what was visualized   · Confirmed with patient that she does have an IUD in place, she reports this was placed about 30-40 years ago and her GYN stated that if she did not have any issues with it that this could stay in place  · MRI lumbar spine pending, f/u results  · No surgical intervention is anticipated at this time per Neurosurgery  Recommend LSO brace to be worn OOB for comfort and improve stability of spine      · APS following, appreciate input for pain control regimen, has been difficult  · PT/OT recommending short-term rehab, patient unsure if she will do that at this time     Compression fracture of L3 lumbar vertebra, closed, initial encounter [facility])   Assessment & Plan    · See above plan for details  · Seen by Endocrinology for her osteoporosis  Given that she had a fracture while on Fosamax, she may benefit from anabolic therapy  She will need to follow up with Endocrinology as an outpatient  · Pain control PRN per recommendations by acute pain service       Hypothyroidism   Assessment & Plan    · Continue thyroid medication  · TSH elevated, seen by Endocrinology, synthroid increased   · Repeat TSH in 4-6 weeks     S/P total hip arthroplasty   Assessment & Plan    · Patient denies hip pain at this time, seen by ortho and do not suspect her back pain is associated with her total hip arthroplasty   · F/u with ortho outpatient       VTE Pharmacologic Prophylaxis:   Pharmacologic: Enoxaparin (Lovenox)  Mechanical VTE Prophylaxis in Place: Yes    Patient Centered Rounds: I have performed bedside rounds with nursing staff today  Discussions with Specialists or Other Care Team Provider: none    Education and Discussions with Family / Patient: patient  Time Spent for Care: 30 minutes  More than 50% of total time spent on counseling and coordination of care as described above  Current Length of Stay: 5 day(s)    Current Patient Status: Inpatient   Certification Statement: The patient will continue to require additional inpatient hospital stay due to as above     Discharge Plan: not yet stable  Awaiting MRI and better pain control, then likely dc to rehab  Code Status: Level 1 - Full Code      Subjective:   patient reports feeling the same today  No real improvement in pain  Weakness noted in RLE       Objective:     Vitals:   Temp (24hrs), Av °F (36 7 °C), Min:97 7 °F (36 5 °C), Max:98 4 °F (36 9 °C)    Temp:  [97 7 °F (36 5 °C)-98 4 °F (36 9 °C)] 98 4 °F (36 9 °C)  HR:  [69-74] 69  Resp:  [18] 18  BP: (116-127)/(60-67) 116/61  SpO2:  [97 %] 97 %  Body mass index is 17 81 kg/m²  Input and Output Summary (last 24 hours): Intake/Output Summary (Last 24 hours) at 11/18/18 0958  Last data filed at 11/18/18 0900   Gross per 24 hour   Intake              300 ml   Output              950 ml   Net             -650 ml       Physical Exam:     Physical Exam   Constitutional: She appears cachectic  No distress  Cachectic    Cardiovascular: Normal rate and regular rhythm  Pulmonary/Chest: Breath sounds normal  No respiratory distress  She has no wheezes  Abdominal: Soft  Bowel sounds are normal  She exhibits no distension  There is no tenderness  Musculoskeletal: She exhibits no edema  Neurological:   limited ROM RLE secondary to pain   Skin: Skin is warm and dry  She is not diaphoretic  Nursing note and vitals reviewed  Additional Data:     Labs:      Results from last 7 days  Lab Units 11/13/18  0152   WBC Thousand/uL 8 15   HEMOGLOBIN g/dL 11 4*   HEMATOCRIT % 36 3   PLATELETS Thousands/uL 364   NEUTROS PCT % 71   LYMPHS PCT % 18   MONOS PCT % 9   EOS PCT % 1       Results from last 7 days  Lab Units 11/17/18  0434   POTASSIUM mmol/L 3 9   CHLORIDE mmol/L 102   CO2 mmol/L 32   BUN mg/dL 17   CREATININE mg/dL 0 49*   CALCIUM mg/dL 8 4   ALK PHOS U/L 97   ALT U/L 22   AST U/L 14           * I Have Reviewed All Lab Data Listed Above  * Additional Pertinent Lab Tests Reviewed:  All Labs Within Last 24 Hours Reviewed    Imaging:    Imaging Reports Reviewed Today Include: all  Imaging Personally Reviewed by Myself Includes:  none    Recent Cultures (last 7 days):           Last 24 Hours Medication List:     Current Facility-Administered Medications:  acetaminophen 975 mg Oral Q8H [facility] Debi Al PA-C   aluminum-magnesium hydroxide-simethicone 30 mL Oral Q6H PRN Abundio Lund MD   vitamin C 1,000 mg Oral Daily Abundio Lund MD   cholecalciferol 1,000 Units Oral Daily Abundio Lund MD   diazepam 5 mg Oral Q12H PRN Abundio Lund MD docusate sodium 100 mg Oral BID Antony Perez MD   docusate sodium 100 mg Oral BID PRN Antony Perez MD   enoxaparin 40 mg Subcutaneous Daily Antony Perez MD   folic acid 4,575 mcg Oral Daily Antony Perez MD   gabapentin 100 mg Oral TID Antony Perez MD   HYDROmorphone 0 25 mg Intravenous Q4H PRN Antony Perez MD   ibuprofen 400 mg Oral Q6H PRN Antony Perez MD   levothyroxine 50 mcg Oral Early Morning Jaylin Herr DO   multivitamin-minerals 1 tablet Oral Daily Antony Perez MD   nortriptyline 35 mg Oral HS Debi Al PA-C   ondansetron 4 mg Intravenous Q6H PRN Antony Perez MD   oxyCODONE 2 5 mg Oral Q4H PRN Antony Perez MD   oxyCODONE 5 mg Oral Q4H PRN Kera Lundberg PA-C   predniSONE 10 mg Oral Daily Antony Perez MD   psyllium 1 packet Oral TID Antony Perez MD   vitamin E (tocopherol) 400 Units Oral Daily Antony Perez MD        Today, Patient Was Seen By: Pedro Coh PA-C    ** Please Note: Dictation voice to text software may have been used in the creation of this document   **

## 2018-11-18 NOTE — PROGRESS NOTES
Progress Note - Neurosurgery   Geetha Colon 75 y o  female MRN: [id number]  Unit/Bed#: CW2 210-02 Encounter: [id number]    Assessment:  1  L3 compression fracture with mild retropulsion  2  Status post fall  3  Ambulatory dysfunction  4  Arthritis the left hip  5  Hypothyroidism  6  Osteoporosis  7  Peripheral vascular disease  8  Restless leg syndrome - takes motrin daily  9  History total hip arthroplasty      Plan:  -awaiting MRI due to lower extremity radiculopathy  -LSO brace when out of bed  -okay for physical therapy ambulation    Subjective/Objective   Still complaining of pain radiating down her right leg      Intake/Output       11/18/18 0701 - 11/19/18 0700      [id number] [id number] Total       Intake    P O   180  -- 180    Total Intake 180 -- 180       Output    Total Output -- -- --       Net I/O     180 -- 180          Invasive Devices     Peripheral Intravenous Line            Peripheral IV 11/15/18 Left Forearm 3 days                Physical Exam:    Vitals: Blood pressure 116/61, pulse 69, temperature 98 4 °F (36 9 °C), temperature source Oral, resp  rate 18, height 5' 2" (1 575 m), weight 44 2 kg (97 lb 6 4 oz), SpO2 97 %, currently breastfeeding  ,Body mass index is 17 81 kg/m²  Awake and alert  Moves all extremities  Lower extremity strength intact  May be limited byon the right  Lungs are clear  Heart regular rate  Abdomen soft    Lab Results: I have personally reviewed pertinent results  Imaging Studies: I have personally reviewed pertinent reports          VTE Pharmacologic Prophylaxis: Enoxaparin (Lovenox)    VTE Mechanical Prophylaxis: sequential compression device

## 2018-11-19 PROCEDURE — 97535 SELF CARE MNGMENT TRAINING: CPT

## 2018-11-19 PROCEDURE — 97116 GAIT TRAINING THERAPY: CPT | Performed by: PHYSICAL THERAPIST

## 2018-11-19 PROCEDURE — 97110 THERAPEUTIC EXERCISES: CPT | Performed by: PHYSICAL THERAPIST

## 2018-11-19 PROCEDURE — 99231 SBSQ HOSP IP/OBS SF/LOW 25: CPT | Performed by: NEUROLOGICAL SURGERY

## 2018-11-19 PROCEDURE — 99232 SBSQ HOSP IP/OBS MODERATE 35: CPT | Performed by: PHYSICIAN ASSISTANT

## 2018-11-19 RX ORDER — OXYCODONE HYDROCHLORIDE 10 MG/1
10 TABLET ORAL EVERY 4 HOURS PRN
Status: DISCONTINUED | OUTPATIENT
Start: 2018-11-19 | End: 2018-11-21 | Stop reason: HOSPADM

## 2018-11-19 RX ORDER — OXYCODONE HYDROCHLORIDE 5 MG/1
5 TABLET ORAL EVERY 4 HOURS PRN
Status: DISCONTINUED | OUTPATIENT
Start: 2018-11-19 | End: 2018-11-21 | Stop reason: HOSPADM

## 2018-11-19 RX ADMIN — GABAPENTIN 100 MG: 100 CAPSULE ORAL at 17:05

## 2018-11-19 RX ADMIN — VITAMIN D, TAB 1000IU (100/BT) 1000 UNITS: 25 TAB at 09:57

## 2018-11-19 RX ADMIN — DOCUSATE SODIUM 100 MG: 100 CAPSULE, LIQUID FILLED ORAL at 09:58

## 2018-11-19 RX ADMIN — ACETAMINOPHEN 975 MG: 325 TABLET, FILM COATED ORAL at 21:18

## 2018-11-19 RX ADMIN — OXYCODONE HYDROCHLORIDE AND ACETAMINOPHEN 1000 MG: 500 TABLET ORAL at 09:59

## 2018-11-19 RX ADMIN — ACETAMINOPHEN 975 MG: 325 TABLET, FILM COATED ORAL at 05:57

## 2018-11-19 RX ADMIN — OXYCODONE HYDROCHLORIDE 5 MG: 5 TABLET ORAL at 03:47

## 2018-11-19 RX ADMIN — GABAPENTIN 100 MG: 100 CAPSULE ORAL at 09:58

## 2018-11-19 RX ADMIN — ACETAMINOPHEN 975 MG: 325 TABLET, FILM COATED ORAL at 15:02

## 2018-11-19 RX ADMIN — OXYCODONE HYDROCHLORIDE 10 MG: 10 TABLET ORAL at 19:14

## 2018-11-19 RX ADMIN — LEVOTHYROXINE SODIUM 50 MCG: 50 TABLET ORAL at 05:57

## 2018-11-19 RX ADMIN — PSYLLIUM HUSK 1 PACKET: 3.4 POWDER ORAL at 09:59

## 2018-11-19 RX ADMIN — OXYCODONE HYDROCHLORIDE 5 MG: 5 TABLET ORAL at 09:58

## 2018-11-19 RX ADMIN — NORTRIPTYLINE HYDROCHLORIDE 35 MG: 10 CAPSULE ORAL at 21:19

## 2018-11-19 RX ADMIN — PREDNISONE 10 MG: 10 TABLET ORAL at 09:58

## 2018-11-19 RX ADMIN — OXYCODONE HYDROCHLORIDE 10 MG: 10 TABLET ORAL at 23:16

## 2018-11-19 RX ADMIN — Medication 1 TABLET: at 09:57

## 2018-11-19 RX ADMIN — FOLIC ACID 1000 MCG: 1 TABLET ORAL at 09:58

## 2018-11-19 RX ADMIN — VITAMIN E CAP 400 UNIT 400 UNITS: 400 CAP at 09:59

## 2018-11-19 RX ADMIN — ENOXAPARIN SODIUM 40 MG: 40 INJECTION SUBCUTANEOUS at 09:59

## 2018-11-19 RX ADMIN — GABAPENTIN 100 MG: 100 CAPSULE ORAL at 21:18

## 2018-11-19 RX ADMIN — OXYCODONE HYDROCHLORIDE 5 MG: 5 TABLET ORAL at 15:03

## 2018-11-19 RX ADMIN — DIAZEPAM 5 MG: 5 TABLET ORAL at 21:23

## 2018-11-19 RX ADMIN — DOCUSATE SODIUM 100 MG: 100 CAPSULE, LIQUID FILLED ORAL at 17:05

## 2018-11-19 NOTE — ASSESSMENT & PLAN NOTE
· TSH elevated, seen by Endocrinology, synthroid increased   · Continue levothyroxine 50 mcg po daily   · Repeat TSH in 4-6 weeks

## 2018-11-19 NOTE — ASSESSMENT & PLAN NOTE
· Likely multifactorial   · She had a fall two weeks ago at home with increasing pain since then  · Imaging shows L3 compression fracture  · S/p total hip arthroplasty, orthopedics consulted, do not feel her sx are related to her hip arthroplasty and more so likely related to L3 compression fracture and recommended neurosurgery consult  · R knee xray negative  · Upright XR with brace shows compression deformity of the L3 vertebral bodies re-demonstrated with approximately 30% vertebral body height loss  · CT lumbar spine shows acute early subacute moderate L3 compression fracture involving the inferior endplate  Bony retropulsion of the posterior inferior margin of the vertebral body into the anterior epidural space with resulting canal stenosis and right-sided foraminal narrowing  Central disc herniations at multiple levels resulting in mild canal stenosis    · MRI lumbar spine due to radiculopathy of right leg:  Right central annular fissure protrusion of L5-S1 encroaching on the right lateral recess and mildly displaced the right S1 nerve root  · Appreciate neurosurgery consult   · Continue LSO brace when OOB    · APS following, appreciate input- patient still admits to 7/10 pain while lying in bed appreciate input for pain control regimen  · PT/OT recommending short-term rehab, patient is currently agreeable to rehab, CM following

## 2018-11-19 NOTE — UTILIZATION REVIEW
Continued Stay Review    Date: 11/172018  Age/Sex: 75 y o  female     Assessment/Plan:   * Ambulatory dysfunction   Assessment & Plan     · Likely multifactorial   · She had a fall two weeks ago at home with increasing pain since then  ? Imaging shows L3 compression fracture  · S/p total hip arthroplasty, orthopedics consulted, do not feel her sx are related to her hip arthroplasty and more so likely related to L3 compression fracture and recommended neurosurgery consult  · R knee xray negative  · Upright XR with brace shows compression deformity of the L3 vertebral bodies re-demonstrated with approximately 30% vertebral body height loss  · CT lumbar spine shows acute early subacute moderate L3 compression fracture involving the inferior endplate  Bony retropulsion of the posterior inferior margin of the vertebral body into the anterior epidural space with resulting canal stenosis and right-sided foraminal narrowing  Central disc herniations at multiple levels resulting in mild canal stenosis  ? Reports of abnormal object noted-patient does have an IUD in place (it seems likely this is a Lippes loop IUD) which was visualized on XR pelvis as well  Per prior provider's d/w radiologist it seems that this is likely what was visualized   ? Confirmed with patient that she does have an IUD in place, she reports this was placed about 30-40 years ago and her GYN stated that if she did not have any issues with it that this could stay in place  · MRI lumbar spine pending, f/u results  · No surgical intervention is anticipated at this time per Neurosurgery  Recommend LSO brace to be worn OOB for comfort and improve stability of spine      · APS following, appreciate input for pain control regimen, has been difficult  · PT/OT recommending short-term rehab, patient unsure if she will do that at this time      Compression fracture of L3 lumbar vertebra, closed, initial encounter [facility])   Assessment & Plan     · See above plan for details  · Seen by Endocrinology for her osteoporosis  Given that she had a fracture while on Fosamax, she may benefit from anabolic therapy  She will need to follow up with Endocrinology as an outpatient  · Pain control PRN per recommendations by acute pain service         Hypothyroidism   Assessment & Plan     · Continue thyroid medication    · TSH elevated, seen by Endocrinology, synthroid increased   · Repeat TSH in 4-6 weeks      S/P total hip arthroplasty   Assessment & Plan     · Patient denies hip pain at this time, seen by ortho and do not suspect her back pain is associated with her total hip arthroplasty   · F/u with ortho outpatient         VTE Pharmacologic Prophylaxis:   Pharmacologic: Enoxaparin (Lovenox)  Mechanical VTE Prophylaxis in Place: Yes  will continue to require additional inpatient hospital stay due to pain control, MRI L spine  Discharge Plan: TBD    Vital Signs: BP 99/72 (BP Location: Left arm)   Pulse 79   Temp 97 6 °F (36 4 °C) (Oral)   Resp 16   Ht 5' 2" (1 575 m)   Wt 45 8 kg (101 lb)   SpO2 99%   BMI 18 47 kg/m²     Medications:   Scheduled Meds:   Current Facility-Administered Medications:  acetaminophen 975 mg Oral Q8H [address] Debi Al PA-C   aluminum-magnesium hydroxide-simethicone 30 mL Oral Q6H PRN Abundio Lund MD   vitamin C 1,000 mg Oral Daily Abundio Lund MD   cholecalciferol 1,000 Units Oral Daily Abundio Lund MD   diazepam 5 mg Oral Q12H PRN Abundio Lund MD   docusate sodium 100 mg Oral BID Abundio Lund MD   docusate sodium 100 mg Oral BID PRN Abundio Lund MD   enoxaparin 40 mg Subcutaneous Daily Abundio Lund MD   folic acid 6,285 mcg Oral Daily Abundio Lund MD   gabapentin 100 mg Oral TID Abundio Lund MD   ibuprofen 400 mg Oral Q6H PRN Abundio Lund MD   levothyroxine 50 mcg Oral Early Morning Jason Pathak DO   multivitamin-minerals 1 tablet Oral Daily Abundio Lund MD   nortriptyline 35 mg Oral HS Debi ANGELA Al   ondansetron 4 mg Intravenous Q6H PRN Nayan Boudreaux MD   oxyCODONE 10 mg Oral Q4H PRN Susy Kapadia PA-C   oxyCODONE 5 mg Oral Q4H PRN Susy Kapadia PA-C   predniSONE 10 mg Oral Daily Nayan Boudreaux MD   psyllium 1 packet Oral TID Nayan Boudreaux MD   vitamin E (tocopherol) 400 Units Oral Daily Nayan Boudreaux MD     Abnormal Labs/Diagnostic Results:

## 2018-11-19 NOTE — RESTORATIVE TECHNICIAN NOTE
Restorative Specialist Mobility Note       Activity: Ambulate in room, Chair, Dangle, Stand at bedside, Turn (Pt left sitting in chair at bedside with call bell, phone, and tray within reach  )     Assistive Device: Front wheel walker (Assist x1 )        Repositioned: Up in chair, Sitting              Anti-Embolism Device On:  Bilateral, Sequential compression devices, below knee

## 2018-11-19 NOTE — PLAN OF CARE
Problem: PHYSICAL THERAPY ADULT  Goal: Performs mobility at highest level of function for planned discharge setting  See evaluation for individualized goals  Treatment/Interventions: Functional transfer training, LE strengthening/ROM, Elevations, Therapeutic exercise, Endurance training, Patient/family training, Equipment eval/education, Bed mobility, Gait training, Compensatory technique education, Spoke to nursing, Spoke to case management          See flowsheet documentation for full assessment, interventions and recommendations  Outcome: Progressing  Prognosis: Good  Problem List: Decreased strength, Decreased endurance, Impaired balance, Decreased mobility, Pain  Assessment: pt continues to have high level of pain  pt was able to amb 20' as usual before significant increase in pain  pt did not need assist to propel rw this session  pt tolerated 20 reps ther ex well  pt continues to need education on bed mobility  pt continues to have strength deficit in r hip flexor, quad and l quads  pt has appreciable atrophy r quad  pt needed min assist to don lso but could perform doff  pt continues to need skilled PT and rehab  Barriers to Discharge: Decreased caregiver support, Inaccessible home environment (lives alone)     Recommendation: Post acute IP rehab     PT - OK to Discharge: Yes (rehab)    See flowsheet documentation for full assessment

## 2018-11-19 NOTE — PLAN OF CARE
Problem: OCCUPATIONAL THERAPY ADULT  Goal: Performs self-care activities at highest level of function for planned discharge setting  See evaluation for individualized goals  Treatment Interventions: ADL retraining, Functional transfer training, UE strengthening/ROM, Endurance training, Patient/family training, Equipment evaluation/education, Compensatory technique education, Continued evaluation, Energy conservation, Activityengagement  Equipment Recommended: Bedside commode, Tub seat with back       See flowsheet documentation for full assessment, interventions and recommendations  Outcome: Progressing  Limitation: Decreased ADL status, Decreased cognition, Decreased endurance, Decreased high-level ADLs, Decreased self-care trans  Prognosis: Fair  Assessment: Patient participated in Skilled OT session 11/19/18 with interventions consisting of ADL re training with the use of correct body mechnaics, Energy Conservation techniques, deep breathing technique, safety awareness and fall prevention techniques, maintaining spinal precautions, therapeutic exercise to: increase functional use of BUEs, increase BUE muscle strength ,  therapeutic activities to: increase activity tolerance, increase standing tolerance time with unilateral UE support to complete sink level ADLs, increase dynamic sit/ stand balance during functional activity , increase postural control, increase trunk control and increase OOB/ sitting tolerance   Patient agreeable to OT treatment session, upon arrival patient was found supine in bed  In comparison to previous session, patient with improvements in transfers, functional mobility, toileting and grooming  Patient requiring verbal cues for safety, verbal cues for correct technique and frequent rest periods  Patient continues to be functioning below baseline level, occupational performance remains limited secondary to factors listed above and increased risk for falls and injury     From OT standpoint, recommendation at time of d/c would be Short Term Rehab when medically stable  Patient to benefit from continued Occupational Therapy treatment while in the hospital to address deficits as defined above and maximize level of functional independence with ADLs and functional mobility        OT Discharge Recommendation: Short Term Rehab  OT - OK to Discharge: Yes (when medically stable)      Comments: Alejandra Smith MS, OTR/L

## 2018-11-19 NOTE — PHYSICAL THERAPY NOTE
Physical Therapy Progress Note     11/19/18 2790   Pain Assessment   Pain Assessment 0-10   Pain Score 7   Pain Location [facility] Pain Intervention(s) Repositioned; Ambulation/increased activity; Elevated   Response to Interventions tolerated well    Restrictions/Precautions   Braces or Orthoses LSO   Other Precautions Fall Risk;Pain   Cognition   Overall Cognitive Status WFL   Orientation Level Oriented X4   Subjective   Subjective continues to have high level of pain and weakness r hip, quads, l quads  wanst to progress further but still having pain   Bed Mobility   Rolling R 4  Minimal assistance   Additional items Assist x 1; Increased time required;Verbal cues;LE management   Rolling L 4  Minimal assistance   Additional items Assist x 1; Increased time required;Verbal cues;LE management   Supine to Sit 4  Minimal assistance   Additional items Assist x 1; Increased time required; Impulsive;Verbal cues;LE management   Sit to Supine 3  Moderate assistance   Additional items Assist x 1; Increased time required;Verbal cues;LE management   Transfers   Sit to Stand 4  Minimal assistance   Additional items Assist x 1; Increased time required;Verbal cues   Stand to Sit 4  Minimal assistance   Additional items Assist x 1; Increased time required;Verbal cues   Ambulation/Elevation   Gait pattern Poor UE support; Forward Flexion;Decreased foot clearance; Short stride; Step to;Excessively slow   Gait Assistance 4  Minimal assist   Additional items Assist x 1;Verbal cues; Tactile cues   Assistive Device Rolling walker   Distance 20'   Balance   Static Sitting Fair   Dynamic Sitting Fair -   Static Standing Fair -   Dynamic Standing Poor +   Ambulatory Poor +   Endurance Deficit   Endurance Deficit Yes   Endurance Deficit Description back pain   Activity Tolerance   Activity Tolerance Patient limited by pain   Nurse Made Aware yes   Exercises   TKR Supine;20 reps;AAROM; Bilateral   Assessment   Prognosis Good   Problem List Decreased strength;Decreased endurance; Impaired balance;Decreased mobility;Pain   Assessment pt continues to have high level of pain  pt was able to amb 20' as usual before significant increase in pain  pt did not need assist to propel rw this session  pt tolerated 20 reps ther ex well  pt continues to need education on bed mobility  pt continues to have strength deficit in r hip flexor, quad and l quads  pt has appreciable atrophy r quad  pt needed min assist to don lso but could perform doff  pt continues to need skilled PT and rehab   Barriers to Discharge Decreased caregiver support; Inaccessible home environment  (lives alone)   Goals   Patient Goals be able to amb without pain   STG Expiration Date 11/21/18   Treatment Day 2   Plan   Treatment/Interventions Functional transfer training;LE strengthening/ROM; Therapeutic exercise; Endurance training;Patient/family training;Equipment eval/education; Bed mobility;Gait training;Spoke to nursing   Progress Slow progress, decreased activity tolerance   PT Frequency (3-5x/week)   Recommendation   Recommendation Post acute IP rehab   PT - OK to Discharge Yes  (rehab)     Chuck Vargas, PT

## 2018-11-19 NOTE — PROGRESS NOTES
Progress Note - Geetha Rivera 1945, 75 y o  female MRN: [id number]  Unit/Bed#: CW2 210-02 Encounter: [id number]  Primary Care Provider: Jose Francisco Noyola MD   Date and time admitted to hospital: 11/12/2018 11:37 PM    * Ambulatory dysfunction   Assessment & Plan    · Likely multifactorial   · She had a fall two weeks ago at home with increasing pain since then  · Imaging shows L3 compression fracture  · S/p total hip arthroplasty, orthopedics consulted, do not feel her sx are related to her hip arthroplasty and more so likely related to L3 compression fracture and recommended neurosurgery consult  · R knee xray negative  · Upright XR with brace shows compression deformity of the L3 vertebral bodies re-demonstrated with approximately 30% vertebral body height loss  · CT lumbar spine shows acute early subacute moderate L3 compression fracture involving the inferior endplate  Bony retropulsion of the posterior inferior margin of the vertebral body into the anterior epidural space with resulting canal stenosis and right-sided foraminal narrowing  Central disc herniations at multiple levels resulting in mild canal stenosis    · MRI lumbar spine due to radiculopathy of right leg:  Right central annular fissure protrusion of L5-S1 encroaching on the right lateral recess and mildly displaced the right S1 nerve root  · Appreciate neurosurgery consult   · Continue LSO brace when OOB    · APS following, appreciate input- patient still admits to 7/10 pain while lying in bed appreciate input for pain control regimen  · PT/OT recommending short-term rehab, patient is currently agreeable to rehab, CM following      S/P total hip arthroplasty   Assessment & Plan    · Patient denies hip pain at this time, seen by ortho and do not suspect her back pain is associated with her total hip arthroplasty   · F/u with ortho outpatient     Compression fracture of L3 lumbar vertebra, closed, initial encounter [facility])   Assessment & Plan · See above plan for details  · Seen by Endocrinology for her osteoporosis  Given that she had a fracture while on Fosamax, she may benefit from anabolic therapy  She will need to follow up with Endocrinology as an outpatient  · Pain control PRN per recommendations by acute pain service       Hypothyroidism   Assessment & Plan    · TSH elevated, seen by Endocrinology, synthroid increased   · Continue levothyroxine 50 mcg po daily   · Repeat TSH in 4-6 weeks         VTE Pharmacologic Prophylaxis:   Pharmacologic: Enoxaparin (Lovenox)  Mechanical VTE Prophylaxis in Place: Yes    Patient Centered Rounds: I have performed bedside rounds with nursing staff today  Discussions with Specialists or Other Care Team Provider: nursing     Education and Discussions with Family / Patient: patient    Time Spent for Care: 30 minutes  More than 50% of total time spent on counseling and coordination of care as described above  Current Length of Stay: 6 day(s)    Current Patient Status: Inpatient   Certification Statement: The patient will continue to require additional inpatient hospital stay due to ambulatory dysfunction, compression fracture of L3 lumbar vertebra     Discharge Plan: pending     Code Status: Level 1 - Full Code      Subjective:   Ms Aurelia Kirkland is still complaining of 7/10 pain today while lying in bed  She denies loss of bowel or bladder control, b ladder paresthesia  She does admit to continue pain from her right hip down to her right knee  She denies lightheadedness, dizziness, chest pain, shortness of breath, abdominal pain, constipation, dysuria, fevers, night sweats, chills  She is currently agreeable to rehab at this time       Objective:     Vitals:   Temp (24hrs), Av °F (36 7 °C), Min:97 9 °F (36 6 °C), Max:98 °F (36 7 °C)    Temp:  [97 9 °F (36 6 °C)-98 °F (36 7 °C)] 97 9 °F (36 6 °C)  HR:  [65-76] 74  Resp:  [16-20] 16  BP: ()/(52-62) 98/52  SpO2:  [97 %-98 %] 98 %  Body mass index is 18 47 kg/m²  Input and Output Summary (last 24 hours): Intake/Output Summary (Last 24 hours) at 11/19/18 0837  Last data filed at 11/19/18 0801   Gross per 24 hour   Intake              300 ml   Output             1450 ml   Net            -1150 ml       Physical Exam:     Physical Exam   Constitutional: She is oriented to person, place, and time  No distress  Frail   HENT:   Mouth/Throat: Oropharynx is clear and moist    Eyes: Pupils are equal, round, and reactive to light  Neck: Neck supple  Cardiovascular: Normal rate, regular rhythm and intact distal pulses  Pulmonary/Chest: Effort normal and breath sounds normal  No respiratory distress  She has no wheezes  She has no rales  Abdominal: Soft  Bowel sounds are normal  She exhibits no distension  There is no tenderness  Musculoskeletal: She exhibits no edema  Neurological: She is alert and oriented to person, place, and time  No cranial nerve deficit  Lower extremity Strength symmetrical, sensation fully intact,    Skin: Skin is warm and dry  She is not diaphoretic  Psychiatric: She has a normal mood and affect  Nursing note and vitals reviewed  Additional Data:     Labs:      Results from last 7 days  Lab Units 11/13/18  0152   WBC Thousand/uL 8 15   HEMOGLOBIN g/dL 11 4*   HEMATOCRIT % 36 3   PLATELETS Thousands/uL 364   NEUTROS PCT % 71   LYMPHS PCT % 18   MONOS PCT % 9   EOS PCT % 1       Results from last 7 days  Lab Units 11/17/18  0434   SODIUM mmol/L 138   POTASSIUM mmol/L 3 9   CHLORIDE mmol/L 102   CO2 mmol/L 32   BUN mg/dL 17   CREATININE mg/dL 0 49*   ANION GAP mmol/L 4   CALCIUM mg/dL 8 4   ALBUMIN g/dL 2 9*   TOTAL BILIRUBIN mg/dL 0 20   ALK PHOS U/L 97   ALT U/L 22   AST U/L 14   GLUCOSE RANDOM mg/dL 78                           * I Have Reviewed All Lab Data Listed Above  * Additional Pertinent Lab Tests Reviewed:  All Labs Within Last 24 Hours Reviewed    Imaging:    Imaging Reports Reviewed Today Include: MRI lumbar spine, CT spine, XR spine lumbar, XR right knee, XR pelvis, XR lumbar spine   Imaging Personally Reviewed by Myself Includes:  none    Recent Cultures (last 7 days):           Last 24 Hours Medication List:     Current Facility-Administered Medications:  acetaminophen 975 mg Oral Q8H [address] Debi Al PA-C   aluminum-magnesium hydroxide-simethicone 30 mL Oral Q6H PRN Ariel Montoya MD   vitamin C 1,000 mg Oral Daily Ariel Montoya MD   cholecalciferol 1,000 Units Oral Daily Ariel Montoya MD   diazepam 5 mg Oral Q12H PRN Ariel Montoya MD   docusate sodium 100 mg Oral BID Ariel Montoya MD   docusate sodium 100 mg Oral BID PRN Ariel Montoya MD   enoxaparin 40 mg Subcutaneous Daily Ariel Montoya MD   folic acid 6,796 mcg Oral Daily Ariel Montoya MD   gabapentin 100 mg Oral TID Ariel Montoya MD   HYDROmorphone 0 25 mg Intravenous Q4H PRN Ariel Montoya MD   ibuprofen 400 mg Oral Q6H PRN Ariel Montoya MD   levothyroxine 50 mcg Oral Early Morning Davide Bales DO   multivitamin-minerals 1 tablet Oral Daily Ariel Montoya MD   nortriptyline 35 mg Oral HS Debi Al PA-C   ondansetron 4 mg Intravenous Q6H PRN Ariel Montoya MD   oxyCODONE 2 5 mg Oral Q4H PRN Ariel Montoya MD   oxyCODONE 5 mg Oral Q4H PRN Stacie Samayoa PA-C   predniSONE 10 mg Oral Daily Ariel Montoya MD   psyllium 1 packet Oral TID Ariel Montoya MD   vitamin E (tocopherol) 400 Units Oral Daily Ariel Montoya MD        Today, Patient Was Seen By: Ponce Choi PA-C    ** Please Note: Dictation voice to text software may have been used in the creation of this document   **

## 2018-11-19 NOTE — OCCUPATIONAL THERAPY NOTE
Occupational Therapy Treatment Note      Geetha Colon    11/19/2018    Patient Active Problem List   Diagnosis    Anxiety    [facility]    RLS (restless legs syndrome)    Arthritis of left hip    S/P total hip arthroplasty    Hypothyroidism    Osteoporosis    RBBB    Screening for colon cancer    Carpal tunnel syndrome on right    Ambulatory dysfunction    Compression fracture of L3 lumbar vertebra, closed, initial encounter [facility])       Past Medical History:   Diagnosis Date    Anxiety     Depression     Disease of thyroid gland     HYPO    Femur neck fracture (HCC)     GERD (gastroesophageal reflux disease)     Hyperthyroidism     RESOLVED: [id number]    Osteoporosis     Polio     [facility]per[facility]     Right BBB/left ant fasc block     UTI (urinary tract infection)     Varicella infection     LAST ASSESSED: [id number]       Past Surgical History:   Procedure Laterality Date    APPENDECTOMY      HIP ARTHROPLASTY Left 11/27/2017    Procedure: REMOVAL IM NAIL CONVERSION TO TOTAL HIP ARTHROPLASTY POSTERIOR APPROACH;  Surgeon: Minnie Fishman MD;  [facility];  Service: Orthopedics    HIP FRACTURE SURGERY      HIP SURGERY      both hips replaced;2013 left ,2014 right    JOINT REPLACEMENT Right     HIP TOTAL    DC CONV PREV HIP SURG TO TOT HIP ARTHROPLAS Left 10/4/2017    Procedure: Selinda Calle removal of left hip;  Surgeon: Minnie Fishman MD;  [facility];  Service: Orthopedics    REVISION TOTAL HIP ARTHROPLASTY Right     TONSILLECTOMY        11/19/18 1535   Restrictions/Precautions   Weight Bearing Precautions Per Order No   Braces or Orthoses LSO   Other Precautions Fall Risk;Pain;Spinal precautions   Lifestyle   Autonomy Pt was I with ADLs, IADLs, and functional mobility w/ rw PTA   Reciprocal Relationships sister   Intrinsic Gratification Pt enjoys doing things around the house   Pain Assessment   Pain Assessment 0-10   Pain Score 7   Pain Type Acute pain   Pain Location Back   Pain Descriptors Aching;Discomfort   Pain Frequency Constant/continuous   Pain Onset Ongoing   Clinical Progression Not changed   Patient's Stated Pain Goal No pain   Hospital Pain Intervention(s) Repositioned; Ambulation/increased activity; Emotional support   Response to Interventions tolerated   ADL   Grooming Assistance 5  Supervision/Setup   Grooming Deficit Setup;Supervision/safety; Increased time to complete; Teeth care;Wash/dry hands   Grooming Comments Pt completed grooming while seated EOB  Attempted grooming while standing w/ RW in front of tray table but pt unable to maintain stand and engage in grooming task  Pt seated EOB w/ setup and able to complete oral care   LB Dressing Assistance 2  Maximal Assistance   LB Dressing Deficit Setup; Requires assistive device for steadying;Verbal cueing;Supervision/safety; Increased time to complete; Don/doff R sock; Don/doff L sock; Thread RLE into pants; Thread LLE into pants;Pull up over hips   LB Dressing Comments Pt required Max A for LB dressing while seated/standing at EOB  Pt required assist to thread/unthread bilateral LE's  Pt able to pull up over legs, w/ required Max A to pull up over waist while standing w/ Min A for steadying support w/ RW  Pt required total A to don/doff socks   Toileting Assistance  4  Minimal Assistance   Toileting Deficit Steadying;Setup;Verbal cueing;Supervison/safety; Increased time to complete; Bedside commode;Grab bar use;Perineal hygiene   Toileting Comments Pt completed toileting w/ Min A x1 for steadying balance, and setup  Pt able to manage perineal hygiene w/ Min A for standing balance  pt used [facility] overtop standard toilet and arm rests for support   Functional Standing Tolerance   Time 1 min   Activity Attempted standing tolerance w/ use of RW while engaging in ADL tasks  Pt unable to maintain stand for longer than 1 min while simultanously engaging in grooming activity   Pt required both hands on RW and mIn A for standing balance   Bed Mobility   Rolling R 2  Maximal assistance   Additional items Assist x 1; Increased time required;Verbal cues   Supine to Sit 3  Moderate assistance   Additional items Assist x 1; Increased time required;LE management;Verbal cues;HOB elevated   Sit to Supine 3  Moderate assistance   Additional items Assist x 1; Increased time required;Verbal cues;LE management   Additional Comments Pt required Max A X1 for roll to R side for initiation into rolling and side lying  Pt went from supine<>sit w/ Mod A x1 for LE management, UB support and HOB elevated for assist  Pt sat EOB w/ CTG for safety/balance   Transfers   Sit to Stand 4  Minimal assistance   Additional items Assist x 1; Increased time required;Verbal cues   Stand to Sit 4  Minimal assistance   Additional items Assist x 1; Increased time required;Verbal cues   Toilet transfer 4  Minimal assistance   Additional items Assist x 1; Increased time required;Standard toilet   Additional Comments Pt performed sit-stands from EOB w/ Min A x1 for safety/balance and VC for proper technique  Pt performed 1 toilet transfer to [facility] overtop standard toilet w/ Min A x1 for mild force production into standing and steadying balance- use of arm rests for support  Functional Mobility   Functional Mobility 4  Minimal assistance   Additional Comments Pt ambulated to and from bathroom w/ Min ax1 for safety/balance and use of RW for support/stability   Additional items Rolling walker   Toilet Transfers   Toilet Transfer From Bed   Toilet Transfer Type To and from   Toilet Transfer to Standard toilet  (w/ [facility] [place] overtop)   Toilet Transfer Technique Ambulating   Toilet Transfers Minimal assistance   Cognition   Overall Cognitive Status WFL   Arousal/Participation Responsive; Cooperative   Attention Within functional limits   Orientation Level Oriented X4   Memory Within functional limits   Following Commands Follows all commands and directions without difficulty   Comments Pt is pleasant and cooperative and motivated to engage in therapy   Additional Activities   Additional Activities Other (Comment)  (spinal precautions)   Additional Activities Comments Pt able to don LSO after setup, w/ supervision while seated EOB  Activity Tolerance   Activity Tolerance Patient tolerated treatment well;Patient limited by pain; Patient limited by fatigue   Medical Staff Made Aware RN   Assessment   Assessment Patient participated in Skilled OT session 11/19/18 with interventions consisting of ADL re training with the use of correct body mechnaics, Energy Conservation techniques, deep breathing technique, safety awareness and fall prevention techniques, maintaining spinal precautions, therapeutic exercise to: increase functional use of BUEs, increase BUE muscle strength ,  therapeutic activities to: increase activity tolerance, increase standing tolerance time with unilateral UE support to complete sink level ADLs, increase dynamic sit/ stand balance during functional activity , increase postural control, increase trunk control and increase OOB/ sitting tolerance   Patient agreeable to OT treatment session, upon arrival patient was found supine in bed  In comparison to previous session, patient with improvements in transfers, functional mobility, toileting and grooming  Patient requiring verbal cues for safety, verbal cues for correct technique and frequent rest periods  Patient continues to be functioning below baseline level, occupational performance remains limited secondary to factors listed above and increased risk for falls and injury  From OT standpoint, recommendation at time of d/c would be Short Term Rehab when medically stable  Patient to benefit from continued Occupational Therapy treatment while in the hospital to address deficits as defined above and maximize level of functional independence with ADLs and functional mobility      Plan   Treatment Interventions ADL retraining;Functional transfer training;UE strengthening/ROM; Endurance training;Patient/family training;Equipment evaluation/education; Compensatory technique education;Continued evaluation; Energy conservation; Activityengagement   Goal Expiration Date 11/24/18   Treatment Day 2   OT Frequency 3-5x/wk   Recommendation   OT Discharge Recommendation Short Term Rehab   Equipment Recommended Bedside commode;Tub seat with back   OT - OK to Discharge Yes  (when medically stable)   Barthel Index   Feeding 10   Bathing 0   Grooming Score 5   Dressing Score 5   Bladder Score 10   Bowels Score 10   Toilet Use Score 5   Transfers (Bed/Chair) Score 10   Mobility (Level Surface) Score 0   Stairs Score 0   Barthel Index Score 55   Modified [place] Scale   Modified [place] Scale 4       Alejandra Smith MS, OTR/L

## 2018-11-19 NOTE — PROGRESS NOTES
Progress Note - Neurosurgery   Geetha Colon 75 y o  female MRN: [id number]  Unit/Bed#: 2 210-02 Encounter: [id number]    Assessment:  1  L3 compression fracture with mild retropulsion  2  Status post fall  3  Ambulatory dysfunction  4  Arthritis the left hip  5  Hypothyroidism  6  Osteoporosis  7  Peripheral vascular disease  8  Restless leg syndrome - takes motrin daily  9  History total hip arthroplasty     Plan:  · Exam:  Alert and oriented x 3, ORNELAS with weakness in BLE, normal to LT and PP, reflexes 3+ and brisk, +bilateral clonus, + bilateral dominguez, DST intact, JPS 3/3, low back paraspinal tenderness no midline tenderness  · Imaging reviewed personally and with attending  Results are as follows:  ? MRI lumbar spine without contrast 11/18/2018:  Marrow edema within the acute/subacute L3 vertebral body which demonstrated fluid cleft and approximately 30% height loss  Stable bony retropulsion of the inferior endplate mild spinal stenosis  There is mild right-sided spinal canal and foraminal stenosis at L3-4  Right central annular fissure and protrusion of L5-S1 encoaching on the right lateral recess and mildly displacing the right S1 nerve root  ? CT spine lumbar without contrast (11/14/2018): Acute or early subacute moderate L3 compression fracture involving the inferior endplate  Bony retropulsion of the posterior inferior margin of the vertebral body into the anterior epidural space with resulting canal stenosis and right-sided foraminal narrowing  Central disc herniations at multiple levels resulting in mild canal stenosis  ? X-ray spine lumbar 11/14/2018:  Compression deformity of the L3 vertebral body is re-demonstrated with approximately 30% vertebral body height loss  ? XR sacrum and coccyx (11/13/2018): Compression fracture of L3, with approximately 25-33% loss of height  ? XR lumbar spine (11/13/2018): Compression fracture of L3 that is new from 2014 study    Mild retropulsion but no high-grade spinal canal narrowing is seen at the level  Spinal alignment is maintained  ? XR pelvis (11/13/2018): Compression fracture of L3, with approximately 25-33% loss of height  · LSO brace to be worn OOB and HOB < 45 degrees  · Hold AC / AP - patient admits to daily use of motrin at home  · Medical management per primary team  ? Recommend APS evaluate patient due to persistent pain on current pain regimen  · DVT ppx: SCDs and lovenox  · Mobilize as tolerated with assistance  ? PT:  Okay to discharge to rehab  ? OT:  Okay to discharge to short term rehab  · Neurosurgery will follow PRN hospitalization  Based on imaging, patient is not a surgical candidate  Would like to try conservative management at this time  Patient will continue to wear LSO brace while out of bed and head of bed greater than 45°  Please call with questions or concerns  ? The patient will follow-up in 2 weeks outpatient on 12/6 with repeat upright lumbar x-rays  Subjective/Objective   Chief Complaint: "My leg hurts " / follow up L3 compression fracture    Subjective:  Patient continues to complain of 7/10 low back pain radiating to her right leg  She complains of right knee pain which gives out on her when she tries to walk  She denies bowel or bladder problems and states urinary complaints from Friday have since resolved  She denies headache, dizziness, chest pain, shortness of breath, abdominal pain, nausea or vomiting, new weakness, numbness or tingling  Objective:  Lying in bed, NAD  I/O       11/17 0701 - 11/18 0700 11/18 0701 - 11/19 0700 11/19 0701 - 11/20 0700    P  O  220 480     Total Intake(mL/kg) 220 (5) 480 (10 5)     Urine (mL/kg/hr) 1300 (1 2) 1400 (1 3) 250 (1 6)    Total Output 1300 1400 250    Net -1080 -920 -250           Unmeasured Stool Occurrence 1 x 1 x           Invasive Devices     Peripheral Intravenous Line            Peripheral IV 11/19/18 Right Wrist less than 1 day                Physical Exam:  Vitals: Blood pressure 98/52, pulse 74, temperature 97 9 °F (36 6 °C), temperature source Oral, resp  rate 16, height 5' 2" (1 575 m), weight 45 8 kg (101 lb), SpO2 98 %, currently breastfeeding  ,Body mass index is 18 47 kg/m²  General appearance: alert, appears stated age, cooperative and no distress  Head: Normocephalic, without obvious abnormality, atraumatic  Eyes: EOMI, PERRL  Neck: supple, symmetrical, trachea midline and NT  Back: no kyphosis present, paraspinal tenderness bilaterally without midline tenderness and low back  Lungs: non labored breathing  Heart: regular heart rate  Neurologic:   Mental status: Alert, oriented x 3, thought content appropriate  Cranial nerves: grossly intact (Cranial nerves II-XII)  Sensory: normal to light touch and pinprick, DST intact, JPS 3/3  Motor: moving all extremities with mild weakness in BLE, strength 5/5 except:   BLE:  3/5 HF/HE/KE/KF, 4/5 DF/PF  Reflexes: 3+ and brisk, +bilateral dominguez, +bilateral clonus  Coordination: finger to nose normal bilaterally, no drift bilaterally    Lab Results:    Results from last 7 days  Lab Units 11/13/18  0152   WBC Thousand/uL 8 15   HEMOGLOBIN g/dL 11 4*   HEMATOCRIT % 36 3   PLATELETS Thousands/uL 364   NEUTROS PCT % 71   MONOS PCT % 9       Results from last 7 days  Lab Units 11/17/18  0434 11/13/18  0152   POTASSIUM mmol/L 3 9 3 5  3 5   CHLORIDE mmol/L 102 103  104   CO2 mmol/L 32 26  28   BUN mg/dL 17 23  22   CREATININE mg/dL 0 49* 0 60  0 62   CALCIUM mg/dL 8 4 8 4  9 0   ALK PHOS U/L 97 99   ALT U/L 22 23   AST U/L 14 15                 No results found for: TROPONINT  ABG:No results found for: PHART, SJG7HPB, PO2ART, YSO2QUC, H3ESKOGT, BEART, SOURCE    Imaging Studies: I have personally reviewed pertinent reports     and I have personally reviewed pertinent films in PACS    Xr Spine Lumbar 2 Or 3 Views Injury    Result Date: 11/14/2018  Impression: Compression deformity of the L3 vertebral body is redemonstrated with approximately 30% vertebral body height loss  Workstation performed: [id number]     Xr Lumbar Spine 2 Or 3 Views    Result Date: 11/13/2018  Impression: Compression fracture of L3 as described, age indeterminant but new from the 2014 exam   Correlation with the patient's symptoms recommended  Mild retropulsion but no high-grade spinal canal narrowing is seen at this level  Spinal alignment is maintained  Visualized bones otherwise appear intact  Other findings as above  Workstation performed: [id number]     Xr Sacrum And Coccyx    Result Date: 11/13/2018  Impression: Compression fracture of L3, with approximately 25-33% loss of height; age indeterminate, better seen on the dedicated lumbar spine views  Visualized bones otherwise appear intact  Other findings as above  Workstation performed: [id number]     Xr Knee 4+ Vw Right Injury    Result Date: 11/13/2018  Impression: No acute osseous abnormality  Workstation performed: [id number]     Ct Spine Lumbar Wo Contrast    Result Date: 11/14/2018  Impression: Acute early subacute moderate L3 compression fracture involving the inferior endplate  Bony retropulsion of the posterior inferior margin of the vertebral body into the anterior epidural space with resulting canal stenosis and right-sided foraminal narrowing  Central disc herniations at multiple levels resulting in mild canal stenosis  The study was marked in [facility] for immediate notification  Workstation performed: [id number]     Xr Pelvis Ap Only 1 Or 2 Vw    Result Date: 11/13/2018  Impression: Compression fracture of L3, with approximately 25-33% loss of height; age indeterminate, better seen on the dedicated lumbar spine views  Visualized bones otherwise appear intact  Other findings as above  Workstation performed: [id number]     EKG, Pathology, and Other Studies: I have personally reviewed pertinent reports        VTE Pharmacologic Prophylaxis: Enoxaparin (Lovenox)    VTE Mechanical Prophylaxis: sequential compression device

## 2018-11-19 NOTE — SOCIAL WORK
Medical update and DC planning:     Per ANGELA Brown with [facility], pt is still in high level of pain requiring IV Dilaudid and po Oxy  Poss dc ready in 1-2 days  CM met with the patient to discuss dc plan preference; pt stated she is agreeable to IP rehab and requested [facility], "My Daughter works there"  ECIN sent to [facility]  CM will follow       Dtwil Myers Junior "[facility]" Kieran [phone]

## 2018-11-20 ENCOUNTER — TELEPHONE (OUTPATIENT)
Dept: NEUROSURGERY | Facility: CLINIC | Age: 73
End: 2018-11-20

## 2018-11-20 LAB
ANION GAP SERPL CALCULATED.3IONS-SCNC: 7 MMOL/L (ref 4–13)
BASOPHILS # BLD AUTO: 0.02 THOUSANDS/ΜL (ref 0–0.1)
BASOPHILS NFR BLD AUTO: 0 % (ref 0–1)
BUN SERPL-MCNC: 18 MG/DL (ref 5–25)
CALCIUM SERPL-MCNC: 8.6 MG/DL (ref 8.3–10.1)
CHLORIDE SERPL-SCNC: 102 MMOL/L (ref 100–108)
CO2 SERPL-SCNC: 28 MMOL/L (ref 21–32)
CREAT SERPL-MCNC: 0.5 MG/DL (ref 0.6–1.3)
EOSINOPHIL # BLD AUTO: 0.09 THOUSAND/ΜL (ref 0–0.61)
EOSINOPHIL NFR BLD AUTO: 1 % (ref 0–6)
ERYTHROCYTE [DISTWIDTH] IN BLOOD BY AUTOMATED COUNT: 14.7 % (ref 11.6–15.1)
GFR SERPL CREATININE-BSD FRML MDRD: 96 ML/MIN/1.73SQ M
GLUCOSE SERPL-MCNC: 77 MG/DL (ref 65–140)
HCT VFR BLD AUTO: 35.8 % (ref 34.8–46.1)
HGB BLD-MCNC: 11.3 G/DL (ref 11.5–15.4)
IMM GRANULOCYTES # BLD AUTO: 0.08 THOUSAND/UL (ref 0–0.2)
IMM GRANULOCYTES NFR BLD AUTO: 1 % (ref 0–2)
LYMPHOCYTES # BLD AUTO: 1.77 THOUSANDS/ΜL (ref 0.6–4.47)
LYMPHOCYTES NFR BLD AUTO: 21 % (ref 14–44)
MCH RBC QN AUTO: 29 PG (ref 26.8–34.3)
MCHC RBC AUTO-ENTMCNC: 31.6 G/DL (ref 31.4–37.4)
MCV RBC AUTO: 92 FL (ref 82–98)
MONOCYTES # BLD AUTO: 0.8 THOUSAND/ΜL (ref 0.17–1.22)
MONOCYTES NFR BLD AUTO: 9 % (ref 4–12)
NEUTROPHILS # BLD AUTO: 5.8 THOUSANDS/ΜL (ref 1.85–7.62)
NEUTS SEG NFR BLD AUTO: 68 % (ref 43–75)
NRBC BLD AUTO-RTO: 0 /100 WBCS
PLATELET # BLD AUTO: 377 THOUSANDS/UL (ref 149–390)
PMV BLD AUTO: 8.6 FL (ref 8.9–12.7)
POTASSIUM SERPL-SCNC: 4 MMOL/L (ref 3.5–5.3)
RBC # BLD AUTO: 3.89 MILLION/UL (ref 3.81–5.12)
SODIUM SERPL-SCNC: 137 MMOL/L (ref 136–145)
WBC # BLD AUTO: 8.56 THOUSAND/UL (ref 4.31–10.16)

## 2018-11-20 PROCEDURE — 99233 SBSQ HOSP IP/OBS HIGH 50: CPT | Performed by: PHYSICIAN ASSISTANT

## 2018-11-20 PROCEDURE — 85025 COMPLETE CBC W/AUTO DIFF WBC: CPT | Performed by: PHYSICIAN ASSISTANT

## 2018-11-20 PROCEDURE — 80048 BASIC METABOLIC PNL TOTAL CA: CPT | Performed by: PHYSICIAN ASSISTANT

## 2018-11-20 RX ORDER — ACETAMINOPHEN 325 MG/1
975 TABLET ORAL EVERY 8 HOURS PRN
Status: DISCONTINUED | OUTPATIENT
Start: 2018-11-20 | End: 2018-11-21 | Stop reason: HOSPADM

## 2018-11-20 RX ORDER — LIDOCAINE 50 MG/G
1 PATCH TOPICAL DAILY
Status: DISCONTINUED | OUTPATIENT
Start: 2018-11-20 | End: 2018-11-21 | Stop reason: HOSPADM

## 2018-11-20 RX ORDER — GABAPENTIN 100 MG/1
200 CAPSULE ORAL 2 TIMES DAILY
Status: DISCONTINUED | OUTPATIENT
Start: 2018-11-20 | End: 2018-11-21 | Stop reason: HOSPADM

## 2018-11-20 RX ADMIN — LEVOTHYROXINE SODIUM 50 MCG: 50 TABLET ORAL at 06:43

## 2018-11-20 RX ADMIN — NORTRIPTYLINE HYDROCHLORIDE 35 MG: 10 CAPSULE ORAL at 21:45

## 2018-11-20 RX ADMIN — Medication 1 TABLET: at 08:40

## 2018-11-20 RX ADMIN — ENOXAPARIN SODIUM 40 MG: 40 INJECTION SUBCUTANEOUS at 08:45

## 2018-11-20 RX ADMIN — LIDOCAINE 1 PATCH: 50 PATCH CUTANEOUS at 10:13

## 2018-11-20 RX ADMIN — OXYCODONE HYDROCHLORIDE 10 MG: 10 TABLET ORAL at 08:40

## 2018-11-20 RX ADMIN — OXYCODONE HYDROCHLORIDE AND ACETAMINOPHEN 1000 MG: 500 TABLET ORAL at 10:13

## 2018-11-20 RX ADMIN — GABAPENTIN 100 MG: 100 CAPSULE ORAL at 08:41

## 2018-11-20 RX ADMIN — OXYCODONE HYDROCHLORIDE 10 MG: 10 TABLET ORAL at 03:40

## 2018-11-20 RX ADMIN — FOLIC ACID 1000 MCG: 1 TABLET ORAL at 08:41

## 2018-11-20 RX ADMIN — PSYLLIUM HUSK 1 PACKET: 3.4 POWDER ORAL at 08:40

## 2018-11-20 RX ADMIN — DIAZEPAM 5 MG: 5 TABLET ORAL at 21:45

## 2018-11-20 RX ADMIN — PREDNISONE 10 MG: 10 TABLET ORAL at 08:40

## 2018-11-20 RX ADMIN — DOCUSATE SODIUM 100 MG: 100 CAPSULE, LIQUID FILLED ORAL at 17:50

## 2018-11-20 RX ADMIN — ACETAMINOPHEN 975 MG: 325 TABLET, FILM COATED ORAL at 06:43

## 2018-11-20 RX ADMIN — DOCUSATE SODIUM 100 MG: 100 CAPSULE, LIQUID FILLED ORAL at 08:40

## 2018-11-20 RX ADMIN — VITAMIN D, TAB 1000IU (100/BT) 1000 UNITS: 25 TAB at 08:41

## 2018-11-20 RX ADMIN — OXYCODONE HYDROCHLORIDE 10 MG: 10 TABLET ORAL at 14:21

## 2018-11-20 RX ADMIN — GABAPENTIN 200 MG: 100 CAPSULE ORAL at 17:50

## 2018-11-20 RX ADMIN — OXYCODONE HYDROCHLORIDE 10 MG: 10 TABLET ORAL at 18:52

## 2018-11-20 RX ADMIN — VITAMIN E CAP 400 UNIT 400 UNITS: 400 CAP at 08:41

## 2018-11-20 NOTE — PROGRESS NOTES
Progress Note - Geetha Rivera 1945, 75 y o  female MRN: [id number]  Unit/Bed#: CW2 210-02 Encounter: [id number]  Primary Care Provider: Bruno Espinosa MD   Date and time admitted to hospital: 11/12/2018 11:37 PM    * Ambulatory dysfunction   Assessment & Plan    · Likely multifactorial   · She had a fall two weeks ago at home with increasing pain since then  · Imaging shows L3 compression fracture  · S/p total hip arthroplasty, orthopedics consulted, do not feel her sx are related to her hip arthroplasty and more so likely related to L3 compression fracture and recommended neurosurgery consult  · R knee xray negative  · Upright XR with brace shows compression deformity of the L3 vertebral bodies re-demonstrated with approximately 30% vertebral body height loss  · CT lumbar spine shows acute early subacute moderate L3 compression fracture involving the inferior endplate  Bony retropulsion of the posterior inferior margin of the vertebral body into the anterior epidural space with resulting canal stenosis and right-sided foraminal narrowing  Central disc herniations at multiple levels resulting in mild canal stenosis    · MRI lumbar spine due to radiculopathy of right leg:  Right central annular fissure protrusion of L5-S1 encroaching on the right lateral recess and mildly displaced the right S1 nerve root  · Appreciate neurosurgery consult   · Continue LSO brace when OOB and HOB > 45 degrees   · Based on imaging patient is not a surgical candidate, continue conservative management    · Recommend follow up outpatient on 12/06 for repeat upright lumbar xray   · APS following, appreciate input- patient still admits to 7/10 pain while lying in bed appreciate input for pain control regimen  · D/c IV pain medications yesterday  · continue oxycodone 10mg po q4hrs for severe pain and 5 mg po q4hrs for moderate pain prn for now   · Will order lidocaine patch, increase gabapentin to 200mg b i d, will d/c motrin and change tylenol to prn   · PT/OT recommending short-term rehab, patient is currently agreeable to rehab, accepted at [facility]- bed available tomorrow   · If patient remains stable will proceed with discahrge tomorrow to [facility] for rehab      S/P total hip arthroplasty   Assessment & Plan    · Patient denies hip pain at this time, seen by ortho and do not suspect her back pain is associated with her total hip arthroplasty   · F/u with ortho outpatient     Compression fracture of L3 lumbar vertebra, closed, initial encounter [facility])   Assessment & Plan    · See above plan for details  · Seen by Endocrinology for her osteoporosis  Given that she had a fracture while on Fosamax, she may benefit from anabolic therapy  She will need to follow up with Endocrinology as an outpatient  · Pain control PRN per recommendations by acute pain service       Hypothyroidism   Assessment & Plan    · TSH elevated, seen by Endocrinology, synthroid increased   · Continue levothyroxine 50 mcg po daily   · Repeat TSH in 4-6 weeks         VTE Pharmacologic Prophylaxis:   Pharmacologic: Enoxaparin (Lovenox)  Mechanical VTE Prophylaxis in Place: Yes    Patient Centered Rounds: I have performed bedside rounds with nursing staff today  Discussions with Specialists or Other Care Team Provider: nursing     Education and Discussions with Family / Patient: patient, left daughter voice message     Time Spent for Care: 30 minutes  More than 50% of total time spent on counseling and coordination of care as described above  Current Length of Stay: 7 day(s)    Current Patient Status: Inpatient   Certification Statement: The patient will continue to require additional inpatient hospital stay due to ambualtory dysfunction with compression fracture of L3 lumbar vertebra     Discharge Plan: plan to discharge to [facility] tomorrow     Code Status: Level 1 - Full Code      Subjective:   Ms Oliver Vargas continues to complain of 7/10 pain of the lower back  She denies saddle paresthesias, new or worsening pain symptoms  She denies chest pain, shortness of breath, abdominal pain, urinary symptoms, constipation, lightheadedness, dizziness  Objective:     Vitals:   Temp (24hrs), Av 6 °F (36 4 °C), Min:97 5 °F (36 4 °C), Max:97 6 °F (36 4 °C)    Temp:  [97 5 °F (36 4 °C)-97 6 °F (36 4 °C)] 97 6 °F (36 4 °C)  HR:  [66-79] 72  Resp:  [16-18] 18  BP: ()/(59-72) 113/59  SpO2:  [96 %-100 %] 100 %  Body mass index is 18 15 kg/m²  Input and Output Summary (last 24 hours): Intake/Output Summary (Last 24 hours) at 18 1118  Last data filed at 18 0900   Gross per 24 hour   Intake              400 ml   Output             1100 ml   Net             -700 ml       Physical Exam:     Physical Exam   Constitutional: She is oriented to person, place, and time  No distress  HENT:   Mouth/Throat: Oropharynx is clear and moist    Eyes: Pupils are equal, round, and reactive to light  Neck: Neck supple  Cardiovascular: Normal rate, regular rhythm and intact distal pulses  Pulmonary/Chest: Effort normal and breath sounds normal  No respiratory distress  She has no wheezes  Abdominal: Soft  Bowel sounds are normal  She exhibits no distension  There is no tenderness  Musculoskeletal: She exhibits no edema  Able to wiggle toes, strength symmetric of the lower extremities, sensation fully intact   Neurological: She is alert and oriented to person, place, and time  No cranial nerve deficit  Skin: Skin is warm and dry  She is not diaphoretic  Psychiatric: She has a normal mood and affect  Nursing note and vitals reviewed          Additional Data:     Labs:      Results from last 7 days  Lab Units 18  0434   WBC Thousand/uL 8 56   HEMOGLOBIN g/dL 11 3*   HEMATOCRIT % 35 8   PLATELETS Thousands/uL 377   NEUTROS PCT % 68   LYMPHS PCT % 21   MONOS PCT % 9   EOS PCT % 1       Results from last 7 days  Lab Units 18  0434 18  0434 SODIUM mmol/L 137 138   POTASSIUM mmol/L 4 0 3 9   CHLORIDE mmol/L 102 102   CO2 mmol/L 28 32   BUN mg/dL 18 17   CREATININE mg/dL 0 50* 0 49*   ANION GAP mmol/L 7 4   CALCIUM mg/dL 8 6 8 4   ALBUMIN g/dL  --  2 9*   TOTAL BILIRUBIN mg/dL  --  0 20   ALK PHOS U/L  --  97   ALT U/L  --  22   AST U/L  --  14   GLUCOSE RANDOM mg/dL 77 78                           * I Have Reviewed All Lab Data Listed Above  * Additional Pertinent Lab Tests Reviewed:  All Labs Within Last 24 Hours Reviewed    Imaging:    Imaging Reports Reviewed Today Include:  MRI lumbar spine, CT lumbar spine  Imaging Personally Reviewed by Myself Includes:  none    Recent Cultures (last 7 days):           Last 24 Hours Medication List:     Current Facility-Administered Medications:  acetaminophen 975 mg Oral Q8H PRN Nawaf Estrada PA-C   aluminum-magnesium hydroxide-simethicone 30 mL Oral Q6H PRN Robbin Hale MD   vitamin C 1,000 mg Oral Daily Robbin Hale MD   cholecalciferol 1,000 Units Oral Daily Robbin Hale MD   diazepam 5 mg Oral Q12H PRN Robbin Hale MD   docusate sodium 100 mg Oral BID Robbin Hale MD   docusate sodium 100 mg Oral BID PRN Robbin Hale MD   enoxaparin 40 mg Subcutaneous Daily Robbin Hale MD   folic acid 2,797 mcg Oral Daily Robbni Hale MD   gabapentin 200 mg Oral BID Nawaf Estrada PA-C   levothyroxine 50 mcg Oral Early Morning Samara Arrow, DO   lidocaine 1 patch Topical Daily Nawaf Estrada PA-C   multivitamin-minerals 1 tablet Oral Daily Robbin Hale MD   nortriptyline 35 mg Oral HS Debi Al PA-C   ondansetron 4 mg Intravenous Q6H PRN Robbin Hale MD   oxyCODONE 10 mg Oral Q4H PRN Nawaf Estrada PA-C   oxyCODONE 5 mg Oral Q4H PRN Nawaf Estrada PA-C   predniSONE 10 mg Oral Daily Robbin Hale MD   psyllium 1 packet Oral TID Robbin Hale MD   vitamin E (tocopherol) 400 Units Oral Daily Robbin Hale MD        Today, Patient Was Seen By: Nawaf Estrada, ANGELA    ** Please Note: Dictation voice to text software may have been used in the creation of this document   **

## 2018-11-20 NOTE — ASSESSMENT & PLAN NOTE
· Likely multifactorial   · She had a fall two weeks ago at home with increasing pain since then  · Imaging shows L3 compression fracture  · S/p total hip arthroplasty, orthopedics consulted, do not feel her sx are related to her hip arthroplasty and more so likely related to L3 compression fracture and recommended neurosurgery consult  · R knee xray negative  · Upright XR with brace shows compression deformity of the L3 vertebral bodies re-demonstrated with approximately 30% vertebral body height loss  · CT lumbar spine shows acute early subacute moderate L3 compression fracture involving the inferior endplate  Bony retropulsion of the posterior inferior margin of the vertebral body into the anterior epidural space with resulting canal stenosis and right-sided foraminal narrowing  Central disc herniations at multiple levels resulting in mild canal stenosis    · MRI lumbar spine due to radiculopathy of right leg:  Right central annular fissure protrusion of L5-S1 encroaching on the right lateral recess and mildly displaced the right S1 nerve root  · Appreciate neurosurgery consult   · Continue LSO brace when OOB and HOB > 45 degrees   · Based on imaging patient is not a surgical candidate, continue conservative management    · Recommend follow up outpatient on 12/06 for repeat upright lumbar xray   · APS following, appreciate input- patient still admits to 7/10 pain while lying in bed appreciate input for pain control regimen  · D/c IV pain medications yesterday  · continue oxycodone 10mg po q4hrs for severe pain and 5 mg po q4hrs for moderate pain prn for now   · Will order lidocaine patch, increase gabapentin to 200mg b i d, will d/c motrin and change tylenol to prn   · PT/OT recommending short-term rehab, patient is currently agreeable to rehab, accepted at [facility]- bed available tomorrow   · If patient remains stable will proceed with discahrge tomorrow to [facility] for rehab

## 2018-11-20 NOTE — PROGRESS NOTES
Progress Note - Inpatient Pain Management    Thalia Pu Colon 75 y o  female MRN: [id number]  Unit/Bed#: CW2 210-02 Encounter: [id number]      Assessment:   Principal Problem:    Ambulatory dysfunction  Active Problems:    S/P total hip arthroplasty    Hypothyroidism    Compression fracture of L3 lumbar vertebra, closed, initial encounter [facility])      Plan/Recommendations:   Acute on chronic back pain  · Change Acetaminophen to 975mg Q8 hours PRN mild pain  · Change Gabapentin to 200mg BID  · Add lidocaine patch to lower back daily, on for 12 hours and off for 12 hours  · Discontinue Motrin PRN  · Oxycodone 5mg Q4 hours PRN moderate pain and Oxycodone 10mg Q4 hours PRN severe pain  · As pain improves, wean off PRN opioids  Can be done at rehab  Back pain is present, stable on current regimen  Georgette Horan for transfer to rehab from a pain standpoint  APS to sign off  Please call with any questions  Reviewed the above treatment plan and recommendations with Gina Farooq PA-C    Final decisions regarding starting or changing doses of the analgesic regimen are at the discretion of the primary service  Pain History  Current pain location(s): lower back   Pain Scale:   7  Severity:  severe  24 hour history: Patient is resting in bed appears comfortable  Lower back pain is present, overall improved with PRN oxycodone  Tolerating pain regimen  No new pain complaints       Current Analgesic regimen:  Acetaminophen 975mg Q8 hours,  Gabapentin 100mg TID, Valium 5mg  Q12 hours PRN, Motrin PRN Oxycodone PRN total 45mg  Bowel Regimen: Colace PRN, Metamucil TID, Colace BID    Meds/Allergies   all current active meds have been reviewed and current meds:   Current Facility-Administered Medications   Medication Dose Route Frequency    acetaminophen (TYLENOL) tablet 975 mg  975 mg Oral Q8H [address]    aluminum-magnesium hydroxide-simethicone (MYLANTA) 200-200-20 mg/5 mL oral suspension 30 mL  30 mL Oral Q6H PRN    ascorbic acid (VITAMIN C) tablet 1,000 mg  1,000 mg Oral Daily    cholecalciferol (VITAMIN D3) tablet 1,000 Units  1,000 Units Oral Daily    diazepam (VALIUM) tablet 5 mg  5 mg Oral Q12H PRN    docusate sodium (COLACE) capsule 100 mg  100 mg Oral BID    docusate sodium (COLACE) capsule 100 mg  100 mg Oral BID PRN    enoxaparin (LOVENOX) subcutaneous injection 40 mg  40 mg Subcutaneous Daily    folic acid (FOLVITE) tablet 1,000 mcg  1,000 mcg Oral Daily    gabapentin (NEURONTIN) capsule 100 mg  100 mg Oral TID    ibuprofen (MOTRIN) tablet 400 mg  400 mg Oral Q6H PRN    levothyroxine tablet 50 mcg  50 mcg Oral Early Morning    multivitamin-minerals (CENTRUM) tablet 1 tablet  1 tablet Oral Daily    nortriptyline (PAMELOR) capsule 35 mg  35 mg Oral HS    ondansetron (ZOFRAN) injection 4 mg  4 mg Intravenous Q6H PRN    oxyCODONE (ROXICODONE) immediate release tablet 10 mg  10 mg Oral Q4H PRN    oxyCODONE (ROXICODONE) IR tablet 5 mg  5 mg Oral Q4H PRN    predniSONE tablet 10 mg  10 mg Oral Daily    psyllium (METAMUCIL) 1 packet  1 packet Oral TID    vitamin E (tocopherol) capsule 400 Units  400 Units Oral Daily       No Known Allergies    Objective     Temp:  [97 5 °F (36 4 °C)-97 6 °F (36 4 °C)] 97 6 °F (36 4 °C)  HR:  [66-79] 72  Resp:  [16-18] 18  BP: ()/(59-72) 113/59      Intake/Output Summary (Last 24 hours) at 11/20/18 0931  Last data filed at 11/20/18 0345   Gross per 24 hour   Intake              450 ml   Output             1300 ml   Net             -850 ml     Last Bowel Movement: yesterday               Physical Exam: /59 (BP Location: Left arm)   Pulse 72   Temp 97 6 °F (36 4 °C) (Oral)   Resp 18   Ht 5' 2" (1 575 m)   Wt 45 kg (99 lb 3 3 oz)   SpO2 100%   BMI 18 15 kg/m²     General Appearance:    Alert, cooperative, no distress   Neurological:   Oriented to person, place, and time   Head:    Normocephalic, without obvious abnormality, atraumatic   Eyes:    EOM's intact   Lungs:     Clear to auscultation bilaterally, respirations unlabored   Chest Wall:    No tenderness or deformity   Abdomen:        Soft, no distention or tenderness, bowel sounds present    Heart:    Regular rate and rhythm   Extremities:   Extremities no edema, intact sensation to light touch   Skin:   Skin color and texture normal, no rashes or lesions     Lab Results:   Results from last 7 days  Lab Units 11/20/18  0434   WBC Thousand/uL 8 56   HEMOGLOBIN g/dL 11 3*   HEMATOCRIT % 35 8   PLATELETS Thousands/uL 377      Results from last 7 days  Lab Units 11/20/18 0434 11/17/18  0434   POTASSIUM mmol/L 4 0 3 9   CHLORIDE mmol/L 102 102   CO2 mmol/L 28 32   BUN mg/dL 18 17   CREATININE mg/dL 0 50* 0 49*   CALCIUM mg/dL 8 6 8 4   ALK PHOS U/L  --  97   ALT U/L  --  22   AST U/L  --  14     Counseling / Coordination of Care  Total floor / unit time spent today 15 minutes  Greater than 50% of total time was spent with the patient and / or family counseling and / or coordination of care   A description of the counseling / coordination of care: Reviewed plan of care and medications with patient, RN staff and primary care team     Stephan Daniel MS, RN-BC  Acute Pain

## 2018-11-20 NOTE — TELEPHONE ENCOUNTER
11/23/2018-CALLED [facility] AT [facility]#[phone] ([address]),SPOKE TO JUAN PABLO AND CONFIRMED 12/06/2018 APPT J Luis Arroyo  11/20/2018-[place] (11/19/2018) WILL F/U OUTPT ON 12/06/2018 AT 8:45am w/NANCY AND REPEAT UPRIGHT XR LUMBAR SPINE  SIGNED OFF      [address] [place] [address]  12/06/2018 APPT J Luis Arroyo

## 2018-11-21 VITALS
DIASTOLIC BLOOD PRESSURE: 52 MMHG | WEIGHT: 98.9 LBS | TEMPERATURE: 98.1 F | RESPIRATION RATE: 18 BRPM | OXYGEN SATURATION: 98 % | HEIGHT: 62 IN | SYSTOLIC BLOOD PRESSURE: 109 MMHG | HEART RATE: 77 BPM | BODY MASS INDEX: 18.2 KG/M2

## 2018-11-21 LAB
ALBUMIN SERPL ELPH-MCNC: 3.31 G/DL (ref 3.5–5)
ALBUMIN SERPL ELPH-MCNC: 57.1 % (ref 52–65)
ALPHA1 GLOB SERPL ELPH-MCNC: 0.32 G/DL (ref 0.1–0.4)
ALPHA1 GLOB SERPL ELPH-MCNC: 5.6 % (ref 2.5–5)
ALPHA2 GLOB SERPL ELPH-MCNC: 0.78 G/DL (ref 0.4–1.2)
ALPHA2 GLOB SERPL ELPH-MCNC: 13.5 % (ref 7–13)
BETA GLOB ABNORMAL SERPL ELPH-MCNC: 0.36 G/DL (ref 0.4–0.8)
BETA1 GLOB SERPL ELPH-MCNC: 6.2 % (ref 5–13)
BETA2 GLOB SERPL ELPH-MCNC: 6.1 % (ref 2–8)
BETA2+GAMMA GLOB SERPL ELPH-MCNC: 0.35 G/DL (ref 0.2–0.5)
GAMMA GLOB ABNORMAL SERPL ELPH-MCNC: 0.67 G/DL (ref 0.5–1.6)
GAMMA GLOB SERPL ELPH-MCNC: 11.5 % (ref 12–22)
IGG/ALB SER: 1.33 {RATIO} (ref 1.1–1.8)
PROT PATTERN SERPL ELPH-IMP: ABNORMAL
PROT SERPL-MCNC: 5.8 G/DL (ref 6.4–8.2)

## 2018-11-21 PROCEDURE — 99239 HOSP IP/OBS DSCHRG MGMT >30: CPT | Performed by: INTERNAL MEDICINE

## 2018-11-21 RX ORDER — LIDOCAINE 50 MG/G
1 PATCH TOPICAL DAILY
Qty: 30 PATCH | Refills: 0 | Status: SHIPPED | OUTPATIENT
Start: 2018-11-21 | End: 2019-07-24 | Stop reason: ALTCHOICE

## 2018-11-21 RX ORDER — LEVOTHYROXINE SODIUM 0.05 MG/1
50 TABLET ORAL
Qty: 30 TABLET | Refills: 0 | Status: SHIPPED | OUTPATIENT
Start: 2018-11-22 | End: 2019-03-05 | Stop reason: SDUPTHER

## 2018-11-21 RX ORDER — ACETAMINOPHEN 325 MG/1
TABLET ORAL
Qty: 30 TABLET | Refills: 0 | Status: SHIPPED | OUTPATIENT
Start: 2018-11-21 | End: 2021-05-20 | Stop reason: ALTCHOICE

## 2018-11-21 RX ORDER — OXYCODONE HYDROCHLORIDE 5 MG/1
5 TABLET ORAL EVERY 4 HOURS PRN
Qty: 30 TABLET | Refills: 0 | Status: SHIPPED | OUTPATIENT
Start: 2018-11-21 | End: 2018-11-28

## 2018-11-21 RX ORDER — GABAPENTIN 100 MG/1
200 CAPSULE ORAL 2 TIMES DAILY
Qty: 90 CAPSULE | Refills: 0 | Status: SHIPPED | OUTPATIENT
Start: 2018-11-21 | End: 2018-12-11 | Stop reason: SDUPTHER

## 2018-11-21 RX ORDER — OXYCODONE HYDROCHLORIDE 10 MG/1
10 TABLET ORAL EVERY 4 HOURS PRN
Qty: 20 TABLET | Refills: 0 | Status: SHIPPED | OUTPATIENT
Start: 2018-11-21 | End: 2018-11-28

## 2018-11-21 RX ADMIN — LIDOCAINE 1 PATCH: 50 PATCH CUTANEOUS at 08:48

## 2018-11-21 RX ADMIN — GABAPENTIN 200 MG: 100 CAPSULE ORAL at 08:45

## 2018-11-21 RX ADMIN — OXYCODONE HYDROCHLORIDE 10 MG: 10 TABLET ORAL at 01:14

## 2018-11-21 RX ADMIN — VITAMIN E CAP 400 UNIT 400 UNITS: 400 CAP at 08:45

## 2018-11-21 RX ADMIN — DOCUSATE SODIUM 100 MG: 100 CAPSULE, LIQUID FILLED ORAL at 08:45

## 2018-11-21 RX ADMIN — OXYCODONE HYDROCHLORIDE 10 MG: 10 TABLET ORAL at 06:44

## 2018-11-21 RX ADMIN — PREDNISONE 10 MG: 10 TABLET ORAL at 08:45

## 2018-11-21 RX ADMIN — ENOXAPARIN SODIUM 40 MG: 40 INJECTION SUBCUTANEOUS at 08:48

## 2018-11-21 RX ADMIN — OXYCODONE HYDROCHLORIDE AND ACETAMINOPHEN 1000 MG: 500 TABLET ORAL at 08:46

## 2018-11-21 RX ADMIN — FOLIC ACID 1000 MCG: 1 TABLET ORAL at 08:45

## 2018-11-21 RX ADMIN — OXYCODONE HYDROCHLORIDE 10 MG: 10 TABLET ORAL at 11:45

## 2018-11-21 RX ADMIN — VITAMIN D, TAB 1000IU (100/BT) 1000 UNITS: 25 TAB at 08:45

## 2018-11-21 RX ADMIN — LEVOTHYROXINE SODIUM 50 MCG: 50 TABLET ORAL at 06:44

## 2018-11-21 RX ADMIN — PSYLLIUM HUSK 1 PACKET: 3.4 POWDER ORAL at 08:50

## 2018-11-21 RX ADMIN — Medication 1 TABLET: at 08:45

## 2018-11-21 NOTE — UTILIZATION REVIEW
Continued Stay Review    Date: 11/21/2018  Age/Sex: 75 y o  female     Assessment/Plan:     Discharge Plan:   11/21/18 0845  Discharge patient Once     Discharge Disposition: Non [facility] SNF/TCU/SNU    Expected Discharge Date: 11/21/18            Vital Signs: /52 (BP Location: Right arm)   Pulse 77   Temp 98 1 °F (36 7 °C) (Oral)   Resp 18   Ht 5' 2" (1 575 m)   Wt 44 9 kg (98 lb 14 4 oz)   SpO2 98%   BMI 18 09 kg/m²     Medications:   Scheduled Meds:   Current Facility-Administered Medications:  acetaminophen 975 mg Oral Q8H PRN Meredith Brown PA-C   aluminum-magnesium hydroxide-simethicone 30 mL Oral Q6H PRN Lidia Shetty MD   vitamin C 1,000 mg Oral Daily Lidia Shetty MD   cholecalciferol 1,000 Units Oral Daily Lidia Shetty MD   diazepam 5 mg Oral Q12H PRN Lidia Shetty MD   docusate sodium 100 mg Oral BID Lidia Shetty MD   docusate sodium 100 mg Oral BID PRN Lidia Shetty MD   enoxaparin 40 mg Subcutaneous Daily Lidia Shetty MD   folic acid 2,373 mcg Oral Daily Lidia Shetty MD   gabapentin 200 mg Oral BID Avani Jasso PA-C   levothyroxine 50 mcg Oral Early Morning Willena Chill, DO   lidocaine 1 patch Topical Daily Avani Jasso PA-C   multivitamin-minerals 1 tablet Oral Daily Lidia Shetty MD   nortriptyline 35 mg Oral HS Debi Al PA-C   ondansetron 4 mg Intravenous Q6H PRN Lidia Shetty MD   oxyCODONE 10 mg Oral Q4H PRN Avani Jasso PA-C   oxyCODONE 5 mg Oral Q4H PRN Avani Jasso PA-C   predniSONE 10 mg Oral Daily Lidia Shetty MD   psyllium 1 packet Oral TID Lidia Shetty MD   vitamin E (tocopherol) 400 Units Oral Daily Lidia Shetty MD     Abnormal Labs/Diagnostic Results:

## 2018-11-21 NOTE — SOCIAL WORK
Care coordination with ZAIN Harepr, pt medically cleared for d/c to rehab  CM rec'd call from Aly Britt at [facility], per Aly Britt they will  patient at 12:30pm  CM informed patient, pts family, RN, and physician   Report# [phone], Fax# [phone]

## 2018-11-21 NOTE — PROGRESS NOTES
Pt resting in bed, SCDs on  Reporting back pain, given lidocaine patch w pos effect  Pt cooperative and pleasant  Reinforcement of education provided  Requesting that CM speak w her niece

## 2018-11-21 NOTE — ASSESSMENT & PLAN NOTE
· Seen by ortho and do not suspect her back pain is associated with her total hip arthroplasty   · F/u with ortho outpatient

## 2018-11-21 NOTE — ASSESSMENT & PLAN NOTE
· multifactorial   · She had a fall two weeks ago at home with increasing pain since then  · Imaging shows L3 compression fracture  · S/p total hip arthroplasty, orthopedics consulted, do not feel her sx are related to her hip arthroplasty and more so likely related to L3 compression fracture and recommended neurosurgery consult  · R knee xray negative  · Upright XR with brace shows compression deformity of the L3 vertebral bodies re-demonstrated with approximately 30% vertebral body height loss  · CT lumbar spine shows acute early subacute moderate L3 compression fracture involving the inferior endplate  Bony retropulsion of the posterior inferior margin of the vertebral body into the anterior epidural space with resulting canal stenosis and right-sided foraminal narrowing  Central disc herniations at multiple levels resulting in mild canal stenosis    · MRI lumbar spine due to radiculopathy of right leg:  Right central annular fissure protrusion of L5-S1 encroaching on the right lateral recess and mildly displaced the right S1 nerve root  · Appreciate neurosurgery consult   · Continue LSO brace when OOB and HOB > 45 degrees   · Based on imaging patient is not a surgical candidate, continue conservative management    · Recommend follow up outpatient with neurosurgery on 12/06 for repeat upright lumbar xray   · APS following, appreciate input- patient still admits to 7/10 pain while lying in bed that is unchanged   · continue oxycodone 10mg po q4hrs for severe pain and 5 mg po q4hrs for moderate pain prn for now - will need to titrate down eventually   · continue lidocaine patch, increased gabapentin to 200mg b i d, tylenol prn   · PT/OT recommending short-term rehab, patient is agreeable to rehab, accepted at [place] [id number] for discharge today

## 2018-11-21 NOTE — PROGRESS NOTES
Report called to [facility]  Report given to [facility]

## 2018-11-21 NOTE — DISCHARGE SUMMARY
Discharge- Monet Botello Colon 1945, 75 y o  female MRN: [id number]  Unit/Bed#: 2 210-02 Encounter: [id number]  Primary Care Provider: Allan Hernandez MD   Date and time admitted to hospital: 11/12/2018 11:37 PM      * Ambulatory dysfunction   Assessment & Plan    · multifactorial   · She had a fall two weeks ago at home with increasing pain since then  · Imaging shows L3 compression fracture  · S/p total hip arthroplasty, orthopedics consulted, do not feel her sx are related to her hip arthroplasty and more so likely related to L3 compression fracture and recommended neurosurgery consult  · R knee xray negative  · Upright XR with brace shows compression deformity of the L3 vertebral bodies re-demonstrated with approximately 30% vertebral body height loss  · CT lumbar spine shows acute early subacute moderate L3 compression fracture involving the inferior endplate  Bony retropulsion of the posterior inferior margin of the vertebral body into the anterior epidural space with resulting canal stenosis and right-sided foraminal narrowing  Central disc herniations at multiple levels resulting in mild canal stenosis    · MRI lumbar spine due to radiculopathy of right leg:  Right central annular fissure protrusion of L5-S1 encroaching on the right lateral recess and mildly displaced the right S1 nerve root  · Appreciate neurosurgery consult   · Continue LSO brace when OOB and HOB > 45 degrees   · Based on imaging patient is not a surgical candidate, continue conservative management    · Recommend follow up outpatient with neurosurgery on 12/06 for repeat upright lumbar xray   · APS following, appreciate input- patient still admits to 7/10 pain while lying in bed that is unchanged   · continue oxycodone 10mg po q4hrs for severe pain and 5 mg po q4hrs for moderate pain prn for now - will need to titrate down eventually   · continue lidocaine patch, increased gabapentin to 200mg b i d, tylenol prn   · PT/OT recommending short-term rehab, patient is agreeable to rehab, accepted at [place] [id number] for discharge today       S/P total hip arthroplasty   Assessment & Plan    · Seen by ortho and do not suspect her back pain is associated with her total hip arthroplasty   · F/u with ortho outpatient     Compression fracture of L3 lumbar vertebra, closed, initial encounter [facility])   Assessment & Plan    · See above plan for details  · Seen by Endocrinology for her osteoporosis  Given that she had a fracture while on Fosamax, she may benefit from anabolic therapy  She will need to follow up with Endocrinology as an outpatient  · Pain control PRN per recommendations by acute pain service       Hypothyroidism   Assessment & Plan    · TSH elevated, seen by Endocrinology, synthroid increased   · Continue levothyroxine 50 mcg po daily   · Repeat TSH in 4-6 weeks            Discharging Physician / Practitioner: Oh Dodson PA-C  PCP: Ronnie Crawford MD  Admission Date:   Admission Orders     Ordered        11/13/18 0335  Inpatient Admission  Once             Discharge Date: 11/21/18    Resolved Problems  Date Reviewed: 11/21/2018    None          Consultations During Hospital Stay:  · Neurosurgery  · Orthopedics  · Endocrinology    Procedures Performed:     · X-ray sacrum and coccyx 11/13:  Compression fracture of L3 with approximately 25-33% loss of height, visualized bones appear intact  · X-ray lumbar spine on 11/13:  Compression fracture of L3 as described, age indeterminate but new from the 2014 exam   Mild retropulsion but no high-grade spinal canal narrowing is seen at this level  Spinal alignment is maintained  · X-ray right knee 11/13:  No acute osseous abnormality  · X-ray lumbar spine 11/14:  Compression deformity of L3 vertebral body with approximately 30% vertebral body height loss  · CT lumbar spine 11/14:  Acute early subacute moderate L3 compression fracture involving the inferior endplate    Bony retropulsion of the posterior inferior margin of the vertebral body into the anterior epidural space with resulting canal stenosis and right-sided foraminal narrowing  Central disc herniations at multiple levels resulting in mild canal stenosis  · MRI lumbar spine on 11/18:  Marrow edema within the acute/subacute L3 vertebral body which demonstrates of fluid cleft approximately 30% height loss  Stable bony retropulsion of the inferior endplate with mild spinal stenosis  There is mild right-sided spinal canal and foraminal stenosis at L3-L4  Right central annular fissure and protrusion of L5-S1 encroaching on the right lateral recess and mildly displacing the right S1 nerve root  · TSH 9 580    Significant Findings / Test Results:     · As above    Incidental Findings:   · none     Test Results Pending at Discharge (will require follow up):   · none     Outpatient Tests Requested:  · Follow-up outpatient with endocrinology for repeat TSH in 4-6 weeks in for follow-up of osteoporosis  · Follow-up with Neurosurgery on 12/06 repeat upright lumbar x-ray    Complications:  none    Reason for Admission: ambulatory dysfunction with L3 compression fracture     Hospital Course:     Óscar Osei is a 75 y o  female patient with past medical history significant for osteoporosis, hypothyroidism, hypertension, status post arthroplasty, ambulatory dysfunction who originally presented to the hospital on 11/12/2018 due to ambulatory dysfunction and increasing back pain which radiated into the right hip and right buttocks towards the right knee  Patient was recently diagnosed with L3 compression fracture for which she was supposed to follow up with orthopedics outpatient, but due to increasing pain and presented to [facility] Emergency Department    Orthopedics was consulted while in the hospital and patient at that time did not have symptoms associated with her total hip arthroplasty and they were recommending deferring management to Neurosurgery  Neurosurgery evaluated the patient and based on imaging patient is not a surgical candidate and conservative management will be pursued  Patient will continue to wear LSO brace while out of bed and head of bed is greater than 45°  Patient is recommended to follow up with Neurosurgery in 2 weeks outpatient on 12/06 with repeat up right lumbar x-rays at that time  Patient was seen by acute Pain service while in the hospital for persistent pain  She was weaned off IV pain medications although continues to have persistent pain that is better controlled than previously with  Hopeful for future plan for patient to be weaned off all narcotics  Patient was seen by PT/OT who recommending short-term rehab, patient was agreeable to go to [facility]  Of note, patient was seen by endocrinology who recommended outpatient follow-up for osteoporosis management  Her TSH while in the hospital was noted to be elevated and her levothyroxine was increased  Recommending repeat TSH labs are in 4-6 weeks  Patient is currently medically stable for discharge today to [facility]  Please see above list of diagnoses and related plan for additional information  Condition at Discharge: stable     Discharge Day Visit / Exam:     Subjective:  Ms Darlene Han continues to admits to pain in her right lower back traveling down to her knee  She denies any chest pain, shortness of breath, lightheadedness, dizziness, abdominal pain, numbness or tingling in the right lower extremity  Vitals: Blood Pressure: 109/52 (11/21/18 0732)  Pulse: 77 (11/21/18 0732)  Temperature: 98 1 °F (36 7 °C) (11/21/18 0732)  Temp Source: Oral (11/21/18 0732)  Respirations: 18 (11/21/18 0732)  Height: 5' 2" (157 5 cm) (11/13/18 8217)  Weight - Scale: 44 9 kg (98 lb 14 4 oz) (11/21/18 0534)  SpO2: 98 % (11/21/18 0732)  Exam:   Physical Exam   Constitutional: She is oriented to person, place, and time  No distress     HENT: Mouth/Throat: Oropharynx is clear and moist    Eyes: Pupils are equal, round, and reactive to light  Neck: Neck supple  Cardiovascular: Normal rate, regular rhythm and intact distal pulses  Pulmonary/Chest: Effort normal and breath sounds normal  No respiratory distress  She has no wheezes  She has no rales  Abdominal: Soft  Bowel sounds are normal  She exhibits no distension  There is no tenderness  Musculoskeletal: She exhibits no edema  Strength 5/5 upper and lower extremities, sensation fully intact   Neurological: She is alert and oriented to person, place, and time  Skin: Skin is warm and dry  She is not diaphoretic  Psychiatric: She has a normal mood and affect  Nursing note and vitals reviewed  Discussion with Family: patient, left voice message for daughter     Discharge instructions/Information to patient and family:   See after visit summary for information provided to patient and family  Provisions for Follow-Up Care:  See after visit summary for information related to follow-up care and any pertinent home health orders  Disposition:     Celeste Vaz77 (see below)    For Discharges to [facility] SNF:   · [address] [place] [address] Texted SNF Physician    Planned Readmission: none     Discharge Statement:  I spent 45 minutes discharging the patient  This time was spent on the day of discharge  I had direct contact with the patient on the day of discharge  Greater than 50% of the total time was spent examining patient, answering all patient questions, arranging and discussing plan of care with patient as well as directly providing post-discharge instructions  Additional time then spent on discharge activities  Discharge Medications:  See after visit summary for reconciled discharge medications provided to patient and family        ** Please Note: This note has been constructed using a voice recognition system **

## 2018-11-23 ENCOUNTER — TRANSITIONAL CARE MANAGEMENT (OUTPATIENT)
Dept: FAMILY MEDICINE CLINIC | Facility: CLINIC | Age: 73
End: 2018-11-23

## 2018-12-03 ENCOUNTER — HOSPITAL ENCOUNTER (OUTPATIENT)
Dept: RADIOLOGY | Facility: HOSPITAL | Age: 73
Discharge: HOME | End: 2018-12-03
Payer: MEDICARE

## 2018-12-03 ENCOUNTER — TRANSCRIBE ORDERS (OUTPATIENT)
Dept: RADIOLOGY | Facility: HOSPITAL | Age: 73
End: 2018-12-03

## 2018-12-03 DIAGNOSIS — S32.030A COMPRESSION FRACTURE OF L3 LUMBAR VERTEBRA, CLOSED, INITIAL ENCOUNTER (HCC): ICD-10-CM

## 2018-12-03 PROCEDURE — 72100 X-RAY EXAM L-S SPINE 2/3 VWS: CPT

## 2018-12-06 ENCOUNTER — OFFICE VISIT (OUTPATIENT)
Dept: NEUROSURGERY | Facility: CLINIC | Age: 73
End: 2018-12-06
Payer: MEDICARE

## 2018-12-06 VITALS
HEART RATE: 80 BPM | DIASTOLIC BLOOD PRESSURE: 80 MMHG | TEMPERATURE: 98.4 F | BODY MASS INDEX: 18.09 KG/M2 | SYSTOLIC BLOOD PRESSURE: 130 MMHG | HEIGHT: 62 IN

## 2018-12-06 DIAGNOSIS — S32.030D CLOSED COMPRESSION FRACTURE OF L3 LUMBAR VERTEBRA WITH ROUTINE HEALING, SUBSEQUENT ENCOUNTER: Primary | ICD-10-CM

## 2018-12-06 PROCEDURE — 99213 OFFICE O/P EST LOW 20 MIN: CPT | Performed by: PHYSICIAN ASSISTANT

## 2018-12-06 RX ORDER — IBUPROFEN 200 MG
200 TABLET ORAL EVERY 6 HOURS PRN
COMMUNITY
End: 2018-12-11

## 2018-12-06 NOTE — PATIENT INSTRUCTIONS
Recommend you wear the back brace that was issued to you from hospital   If you decline to wear that back brace then encourage you to wear the back brace you purchased  May remove back brace to shower  Otherwise wear LSO back brace when head of bed greater then 45 degrees and out of bed  Refrain from strenuous activity  Refrain from bending and twisting back  Limit lifting, pulling, pushing to no more then 5-10 lbs  No driving while being treated in back brace  Recommend you take fall precautions and refrain from activity that increases chance of fall or injury to back  Follow up with your Primary Care Provider for evaluation / management of osteoporosis  Please have L-spine xray completed at [address] a few days prior to Neurosurgery follow up visit in approximately 4 weeks  Contact our office or present to Emergency Room if experience worsening or new back pain, leg pain, sensory or motor change, bladder or bowel dysfunction, or other neurological change

## 2018-12-06 NOTE — PROGRESS NOTES
Assessment/Plan:    Closed compression fracture of L3 lumbar vertebra with routine healing, subsequent encounter  -     X-ray lumbar spine 2 or 3 views; Future        Discussion / Summary: This is a 75 y o  female who presents for hospital consult (11/13/18) follow up for L3 compression fracture  Patient has been maintained in LSO back brace for approximately 3 1/2 weeks  It is noted though that Ms Rivera decided to discontinue wearing the [employer] brace she was issued during hospitalization and instead has been wearing a Krishna LSO brace that she reports she purchased on-line  (She reports she felt the Ji[address] 80 brace was "too big and too bulky"  )    Lumbar spine xray ( 12/3/18 ) demonstrates stable L3 vertebral compression deformity compared to previous xray 11/14/18  Alignment is unremarkable  Discussed lumbar xray with Ms Rivera  Discussed with patient that once the vertebral body collapses one does not regain height of that vertebral body  Continued management with LSO brace is advised  Recommend patient wear the back brace that was issued to her from hospital   If she declines to wear the Ji[address] 80 back brace then encourage her to wear the Krishna LSO back brace she purchased and is currently wearing  She may remove back brace to shower  Otherwise recommend she wear LSO back brace when head of bed greater then 45 degrees and out of bed  Recommend she refrain from strenuous activity  Recommend she refrain from bending and twisting back  Recommend she limit lifting, pulling, pushing to no more then 5-10 lbs  No driving while being treated in back brace  Recommend she take fall precautions and refrain from activity that increases chance of fall or injury to back  She is advised to follow up with her Primary Care Provider for evaluation / management of osteoporosis      She is advised to have new L-spine xray completed at [address] a few days prior to Neurosurgery follow up visit in approximately 4 weeks  Contact our office or present to Emergency Room if experience worsening or new back pain, leg pain, sensory or motor change, bladder or bowel dysfunction, or other neurological change  Patient expressed understanding and agreement   __________________________________________________________________________      Subjective:      Patient ID: Charmayne Mylar is a 75 y o  female who presents for hospital consult (11/13/18) follow up for L3 compression fracture that was diagnosed s/p presenting to hospital with back pain and RLE pain  She is accompanied with her brother for a portion of office visit today  In review -- she has PMH including ambulatory dysfunction, arthritis of the left hip with total hip arthroplasty, hypothyroidism, osteoporosis, peripheral arterial disease, and restless legs syndrome  She had presented to [facility]/12/18 with LBP and RLE pain  Hospital record indicated she was moving a mattress 3 weeks prior to hospitalization when she felt a crack in her spine  One week later she was trying to get out of bed and slid off the bed  She had back pain that radiated to right leg following that event  During work up she was found to have L3 compression fracture  She has L-spine MRI completed -- findings consistent with acute / subacute L3 vertebral body compression deformity  Mild right sided spinal canal and foraminal stenosis L3-L4  Right central annular fissure and protrusion of L5-S1 encroaching on the right lateral recess and mildly indented the right S1 nerve root  No significant findings that warranted neurosurgical intervention at that time  She was fit with [place] LSO brace and recommended outpatient follow up  HPI Ms Rivera has been maintained in LSO back brace for approximately 3 1/2 weeks  It is noted though that Ms Rivera decided to discontinue wearing the Ji[address] 80 brace she was issued during hospitalization and instead has been wearing a Krishna LSO brace that she reports she purchased on-line  (She reports she felt the [employer] 80 brace was "too big and too bulky")  She reports wearing the Krishna LSO brace  She reports her low back pain is overall better compared to when hospitalized  She reports her back pain comes / goes  She currently describes left and right paralumbar region back pain -- rates as 6-7/10 on pain scale  She reports Motrin helps her back pain  She reports she took Motrin earlier today  She reports she notices the back pain when effect of Motrin wears off  She reports her leg pain is much better compared to when she presented to hospital   She denies lower extremity pain currently  She reports occasional knee pain of either knee that may occur with sitting or standing  Icy Hot helps her pain  She denies numbness, tingling, weakness of her lower extremities  She ambulates with a walker  Patient reports she has been ambulating with walker x 5 years  Ms Vianney Shen reports she has been living at home and her sister is currently staying with her  Patient reports she is to begin home PT tomorrow  She reports she is receiving home VNA services  Patient reports she is on Fosamax of history of osteoporosis  She indicated PCP manages her Fosamax  She denies bladder or bowel dysfunction  She denies saddle paresthesias  The following portions of the patient's history were reviewed and updated as appropriate: allergies, current medications, past family history, past medical history, past social history and past surgical history  Review of Systems   Constitutional: Negative for fever  HENT: Negative  Eyes: Negative for visual disturbance  Respiratory: Negative for shortness of breath  Cardiovascular: Negative for chest pain  Gastrointestinal:        Denies bowel dysfunction   Genitourinary: Negative for difficulty urinating     Musculoskeletal: Positive for back pain    Neurological: Negative for weakness and numbness  Psychiatric/Behavioral: Negative for agitation  Objective:      /80 (BP Location: Left arm, Patient Position: Sitting, Cuff Size: Standard)   Pulse 80   Temp 98 4 °F (36 9 °C) (Temporal)   Ht 5' 2" (1 575 m)   BMI 18 09 kg/m²          Physical Exam   Constitutional: She appears well-developed and well-nourished  No distress  Wearing Krishna LSO brace  Eyes: Conjunctivae are normal    Pulmonary/Chest: Effort normal    Musculoskeletal:        Lumbar back: She exhibits no tenderness and no deformity  Neurological: She is alert  Reflex Scores:       Tricep reflexes are 2+ on the right side and 2+ on the left side  Bicep reflexes are 2+ on the right side and 2+ on the left side  Brachioradialis reflexes are 2+ on the right side and 2+ on the left side  Patellar reflexes are Right patellar reflex grade: +2-3  and Left patellar reflex grade: +2-3  Kali Ingles Achilles reflexes are 2+ on the right side and Left achilles reflex grade: +1-2     Skin: Skin is warm and dry  Psychiatric: She has a normal mood and affect  Her speech is normal        Neurologic Exam     Mental Status   Speech: speech is normal   Level of consciousness: alert    Motor Exam     Motor:  Shoulder abduction 5/5 bilaterally  Elbow flexion/extension 5/5 bilaterally  Wrist flexion/extension 5/5 bilaterally  Finger  / abduction 5/5 bilaterally  Hip flexion left 4+/5 and right 4/5  Hip abduction/adduction 5/5 bilaterally  Knee flexion/extension 5/5 bilaterally  Ankle DF/PF 5/5 bilaterally  Great toe DF 5/5 bilaterally  Sensory Exam     Sensation to pinprick intact b/l upper extremities, torso, and b/l lower extremities  JPS and Vibratory sense intact b/l thumbs and great toes         Gait, Coordination, and Reflexes     Gait  Gait: (Ambulates with walker slow but steady)    Reflexes   Right brachioradialis: 2+  Left brachioradialis: 2+  Right biceps: 2+  Left biceps: 2+  Right triceps: 2+  Left triceps: 2+  Right patellar reflex grade: +2-3  Left patellar reflex grade: +2-3  Right achilles: 2+  Left achilles reflex grade: +1-2  Right plantar: normal  Left plantar: normal  Right ankle clonus: absent  Left ankle clonus: absent    High arches       Imaging study:    12/3/18 [facility] L-spine xray -- per report: "LUMBAR SPINE     INDICATION:   S32 030A: Wedge compression fracture of third lumbar vertebra, initial encounter for closed fracture      COMPARISON:  11/14/2018     VIEWS:  XR SPINE LUMBAR 2 OR 3 VIEWS INJURY        FINDINGS:     Moderate L3 vertebral compression deformity appears stable    No further compression abnormality identified      Alignment is unremarkable      Mild facet changes noted at lower lumbar levels      The pedicles appear intact      Soft tissues are unremarkable      Status post bilateral hip arthroplasties      IMPRESSION:  Stable L3 vertebral compression deformity noted      Workstation performed: [id number] "

## 2018-12-06 NOTE — LETTER
December 9, 2018     Rudy Pompa MD  [address]    Patient: Ron Ledezma   YOB: 1945   Date of Visit: 12/6/2018       Dear [facility]: Thank you for referring Ron Ledezma to me for evaluation  Below are my notes for this consultation  If you have questions, please do not hesitate to call me  I look forward to following your patient along with you  Sincerely,        Kell Mendoza PA-C        CC: No Recipients  Kell Mendoza PA-C  12/9/2018  7:11 PM  Sign at close encounter  Assessment/Plan:    Closed compression fracture of L3 lumbar vertebra with routine healing, subsequent encounter  -     X-ray lumbar spine 2 or 3 views; Future        Discussion / Summary: This is a 75 y o  female who presents for hospital consult (11/13/18) follow up for L3 compression fracture  Patient has been maintained in LSO back brace for approximately 3 1/2 weeks  It is noted though that Ms Rivera decided to discontinue wearing the Jižní 80 brace she was issued during hospitalization and instead has been wearing a Krishna LSO brace that she reports she purchased on-line  (She reports she felt the Jižní 80 brace was "too big and too bulky"  )    Lumbar spine xray ( 12/3/18 ) demonstrates stable L3 vertebral compression deformity compared to previous xray 11/14/18  Alignment is unremarkable  Discussed lumbar xray with Ms Rivera  Discussed with patient that once the vertebral body collapses one does not regain height of that vertebral body  Continued management with LSO brace is advised  Recommend patient wear the back brace that was issued to her from hospital   If she declines to wear the Jižní 80 back brace then encourage her to wear the Krishna LSO back brace she purchased and is currently wearing  She may remove back brace to shower    Otherwise recommend she wear LSO back brace when head of bed greater then 45 degrees and out of bed  Recommend she refrain from strenuous activity  Recommend she refrain from bending and twisting back  Recommend she limit lifting, pulling, pushing to no more then 5-10 lbs  No driving while being treated in back brace  Recommend she take fall precautions and refrain from activity that increases chance of fall or injury to back  She is advised to follow up with her Primary Care Provider for evaluation / management of osteoporosis  She is advised to have new L-spine xray completed at [address] a few days prior to Neurosurgery follow up visit in approximately 4 weeks  Contact our office or present to Emergency Room if experience worsening or new back pain, leg pain, sensory or motor change, bladder or bowel dysfunction, or other neurological change  Patient expressed understanding and agreement   __________________________________________________________________________      Subjective:      Patient ID: Ced Gonsalez is a 75 y o  female who presents for hospital consult (11/13/18) follow up for L3 compression fracture that was diagnosed s/p presenting to hospital with back pain and RLE pain  She is accompanied with her brother for a portion of office visit today  In review -- she has PMH including ambulatory dysfunction, arthritis of the left hip with total hip arthroplasty, hypothyroidism, osteoporosis, peripheral arterial disease, and restless legs syndrome  She had presented to [facility] 11/12/18 with LBP and RLE pain  Hospital record indicated she was moving a mattress 3 weeks prior to hospitalization when she felt a crack in her spine  One week later she was trying to get out of bed and slid off the bed  She had back pain that radiated to right leg following that event  During work up she was found to have L3 compression fracture  She has L-spine MRI completed -- findings consistent with acute / subacute L3 vertebral body compression deformity    Mild right sided spinal canal and foraminal stenosis L3-L4  Right central annular fissure and protrusion of L5-S1 encroaching on the right lateral recess and mildly indented the right S1 nerve root  No significant findings that warranted neurosurgical intervention at that time  She was fit with [place] LSO brace and recommended outpatient follow up  HPI Ms Rivera has been maintained in LSO back brace for approximately 3 1/2 weeks  It is noted though that Ms Rivera decided to discontinue wearing the Jižní 80 brace she was issued during hospitalization and instead has been wearing a Krishna LSO brace that she reports she purchased on-line  (She reports she felt the Jižní 80 brace was "too big and too bulky")  She reports wearing the Krishna LSO brace  She reports her low back pain is overall better compared to when hospitalized  She reports her back pain comes / goes  She currently describes left and right paralumbar region back pain -- rates as 6-7/10 on pain scale  She reports Motrin helps her back pain  She reports she took Motrin earlier today  She reports she notices the back pain when effect of Motrin wears off  She reports her leg pain is much better compared to when she presented to hospital   She denies lower extremity pain currently  She reports occasional knee pain of either knee that may occur with sitting or standing  Icy Hot helps her pain  She denies numbness, tingling, weakness of her lower extremities  She ambulates with a walker  Patient reports she has been ambulating with walker x 5 years  Ms Ravinder Guerra reports she has been living at home and her sister is currently staying with her  Patient reports she is to begin home PT tomorrow  She reports she is receiving home VNA services  Patient reports she is on Fosamax of history of osteoporosis  She indicated PCP manages her Fosamax  She denies bladder or bowel dysfunction  She denies saddle paresthesias  The following portions of the patient's history were reviewed and updated as appropriate: allergies, current medications, past family history, past medical history, past social history and past surgical history  Review of Systems   Constitutional: Negative for fever  HENT: Negative  Eyes: Negative for visual disturbance  Respiratory: Negative for shortness of breath  Cardiovascular: Negative for chest pain  Gastrointestinal:        Denies bowel dysfunction   Genitourinary: Negative for difficulty urinating  Musculoskeletal: Positive for back pain  Neurological: Negative for weakness and numbness  Psychiatric/Behavioral: Negative for agitation  Objective:      /80 (BP Location: Left arm, Patient Position: Sitting, Cuff Size: Standard)   Pulse 80   Temp 98 4 °F (36 9 °C) (Temporal)   Ht 5' 2" (1 575 m)   BMI 18 09 kg/m²           Physical Exam   Constitutional: She appears well-developed and well-nourished  No distress  Wearing Krishna LSO brace  Eyes: Conjunctivae are normal    Pulmonary/Chest: Effort normal    Musculoskeletal:        Lumbar back: She exhibits no tenderness and no deformity  Neurological: She is alert  Reflex Scores:       Tricep reflexes are 2+ on the right side and 2+ on the left side  Bicep reflexes are 2+ on the right side and 2+ on the left side  Brachioradialis reflexes are 2+ on the right side and 2+ on the left side  Patellar reflexes are Right patellar reflex grade: +2-3  and Left patellar reflex grade: +2-3  Carlee Soulier Achilles reflexes are 2+ on the right side and Left achilles reflex grade: +1-2     Skin: Skin is warm and dry  Psychiatric: She has a normal mood and affect  Her speech is normal        Neurologic Exam     Mental Status   Speech: speech is normal   Level of consciousness: alert    Motor Exam     Motor:  Shoulder abduction 5/5 bilaterally  Elbow flexion/extension 5/5 bilaterally    Wrist flexion/extension 5/5 bilaterally  Finger  / abduction 5/5 bilaterally  Hip flexion left 4+/5 and right 4/5  Hip abduction/adduction 5/5 bilaterally  Knee flexion/extension 5/5 bilaterally  Ankle DF/PF 5/5 bilaterally  Great toe DF 5/5 bilaterally  Sensory Exam     Sensation to pinprick intact b/l upper extremities, torso, and b/l lower extremities  JPS and Vibratory sense intact b/l thumbs and great toes  Gait, Coordination, and Reflexes     Gait  Gait: (Ambulates with walker slow but steady)    Reflexes   Right brachioradialis: 2+  Left brachioradialis: 2+  Right biceps: 2+  Left biceps: 2+  Right triceps: 2+  Left triceps: 2+  Right patellar reflex grade: +2-3  Left patellar reflex grade: +2-3  Right achilles: 2+  Left achilles reflex grade: +1-2  Right plantar: normal  Left plantar: normal  Right ankle clonus: absent  Left ankle clonus: absent    High arches       Imaging study:    12/3/18 [facility] L-spine xray -- per report: "LUMBAR SPINE     INDICATION:   S32 [id number]: Wedge compression fracture of third lumbar vertebra, initial encounter for closed fracture      COMPARISON:  11/14/2018     VIEWS:  XR SPINE LUMBAR 2 OR 3 VIEWS INJURY        FINDINGS:     Moderate L3 vertebral compression deformity appears stable    No further compression abnormality identified      Alignment is unremarkable      Mild facet changes noted at lower lumbar levels      The pedicles appear intact      Soft tissues are unremarkable      Status post bilateral hip arthroplasties      IMPRESSION:  Stable L3 vertebral compression deformity noted      Workstation performed: [id number] "

## 2018-12-07 ENCOUNTER — TELEPHONE (OUTPATIENT)
Dept: FAMILY MEDICINE CLINIC | Facility: CLINIC | Age: 73
End: 2018-12-07

## 2018-12-07 NOTE — TELEPHONE ENCOUNTER
Just seen by neurosurg yesterday - please call them for pain issues - they should be able to adjust gabapentin over the phone

## 2018-12-07 NOTE — TELEPHONE ENCOUNTER
Patient having pain 7/10-5/10 never goes below 5     5 is with the oxy and she does not want to take that  Rivas is asking if we can add Tylenol and increase her Gabapentin  She is only taking 100mg x3 day  If that is increased he really thinks that would help the lumbar pain  So, adding the Tylenol and increasing the Gabapentin dose along with PT to increase her core strength (already in place) would be his POC for [place]  PS:  Pt  Was on schedule for a ALONDRA Monday 12/10, however she refuses due to pain    (I tried but could not talk her into keeping it )

## 2018-12-07 NOTE — TELEPHONE ENCOUNTER
Patient having pain 7/10-5/10 never goes below 5      5 is with the oxy and she does not want to take that  Sven Riley is asking if we can add Tylenol and increase her Gabapentin  She is only taking 100mg x3 day  If that is increased he really thinks that would help the lumbar pain      So, adding the Tylenol and increasing the Gabapentin dose along with PT to increase her core strength (already in place) would be his POC for [place]      PS:  Pt  Was on schedule for a ALONDRA Monday 12/10, however she refuses due to pain    (I tried but could not talk her into keeping it )

## 2018-12-10 ENCOUNTER — TELEPHONE (OUTPATIENT)
Dept: FAMILY MEDICINE CLINIC | Facility: CLINIC | Age: 73
End: 2018-12-10

## 2018-12-11 ENCOUNTER — OFFICE VISIT (OUTPATIENT)
Dept: FAMILY MEDICINE CLINIC | Facility: CLINIC | Age: 73
End: 2018-12-11
Payer: MEDICARE

## 2018-12-11 ENCOUNTER — TRANSITIONAL CARE MANAGEMENT (OUTPATIENT)
Dept: FAMILY MEDICINE CLINIC | Facility: CLINIC | Age: 73
End: 2018-12-11

## 2018-12-11 VITALS
TEMPERATURE: 98 F | WEIGHT: 100 LBS | BODY MASS INDEX: 18.88 KG/M2 | DIASTOLIC BLOOD PRESSURE: 70 MMHG | HEART RATE: 83 BPM | RESPIRATION RATE: 16 BRPM | SYSTOLIC BLOOD PRESSURE: 120 MMHG | OXYGEN SATURATION: 99 % | HEIGHT: 61 IN

## 2018-12-11 DIAGNOSIS — S72.009D CLOSED FRACTURE OF NECK OF FEMUR WITH ROUTINE HEALING, UNSPECIFIED LATERALITY, SUBSEQUENT ENCOUNTER: ICD-10-CM

## 2018-12-11 DIAGNOSIS — Z09 HOSPITAL DISCHARGE FOLLOW-UP: ICD-10-CM

## 2018-12-11 DIAGNOSIS — R26.2 AMBULATORY DYSFUNCTION: ICD-10-CM

## 2018-12-11 DIAGNOSIS — Z96.641 STATUS POST TOTAL REPLACEMENT OF RIGHT HIP: ICD-10-CM

## 2018-12-11 DIAGNOSIS — M81.0 OSTEOPOROSIS, UNSPECIFIED OSTEOPOROSIS TYPE, UNSPECIFIED PATHOLOGICAL FRACTURE PRESENCE: ICD-10-CM

## 2018-12-11 DIAGNOSIS — S32.030D CLOSED COMPRESSION FRACTURE OF L3 LUMBAR VERTEBRA WITH ROUTINE HEALING, SUBSEQUENT ENCOUNTER: Primary | ICD-10-CM

## 2018-12-11 PROCEDURE — 99214 OFFICE O/P EST MOD 30 MIN: CPT | Performed by: NURSE PRACTITIONER

## 2018-12-11 RX ORDER — GABAPENTIN 100 MG/1
300 CAPSULE ORAL 3 TIMES DAILY
Qty: 90 CAPSULE | Refills: 5 | Status: SHIPPED | OUTPATIENT
Start: 2018-12-11 | End: 2018-12-11

## 2018-12-11 RX ORDER — GABAPENTIN 300 MG/1
300 CAPSULE ORAL 3 TIMES DAILY
Qty: 90 CAPSULE | Refills: 1 | Status: SHIPPED | OUTPATIENT
Start: 2018-12-11 | End: 2019-02-03 | Stop reason: SDUPTHER

## 2018-12-11 RX ORDER — OXYCODONE HYDROCHLORIDE 5 MG/1
5 CAPSULE ORAL
COMMUNITY
End: 2019-01-08

## 2018-12-11 RX ORDER — IBUPROFEN 600 MG/1
600 TABLET ORAL EVERY 8 HOURS PRN
Qty: 90 TABLET | Refills: 0 | Status: SHIPPED | OUTPATIENT
Start: 2018-12-11 | End: 2019-01-03 | Stop reason: SDUPTHER

## 2018-12-11 NOTE — PROGRESS NOTES
Assessment/Plan:    No problem-specific Assessment & Plan notes found for this encounter  Diagnoses and all orders for this visit:    Closed compression fracture of L3 lumbar vertebra with routine healing, subsequent encounter  -     ibuprofen (MOTRIN) 600 mg tablet; Take 1 tablet (600 mg total) by mouth every 8 (eight) hours as needed for moderate pain w food    Status post total replacement of right hip    Ambulatory dysfunction    Hospital discharge follow-up    Osteoporosis, unspecified osteoporosis type, unspecified pathological fracture presence    Closed fracture of neck of femur with routine healing, unspecified laterality, subsequent encounter  -     gabapentin (NEURONTIN) 100 mg capsule; Take 3 capsules (300 mg total) by mouth 3 (three) times a day    Other orders  -     oxyCODONE (OXY-IR) 5 MG capsule; Take 5 mg by mouth daily at bedtime        Stop over-the-counter ibuprofen  I increased her gabapentin from 100 mg tabs to 300 mg tabs take 3 times a day -may be a little sleepy for 1-2 days while adjusting to this new dose  Take 2 tabs of Tylenol with gabapentin every 8 hr  Take ibuprofen 600 mg tabs in between gabapentin twice a day  Ask Dr Gilbert Al for an updated DEXA scan and informed him of year compression fracture    SEE prn and chronic dz in 6 mos    Subjective:   Chief Complaint   Patient presents with    Follow-up     Discharge from [facility]           Patient ID: Ruben Vieyra is a 75 y o  female  HPI  This is a 75 y o  female who presents for hospital follow and rehab course at [facility] for L3 compression fracture  Pt seen by NS s/p niraj from rehab  Using Standard [place] brace  Pain is controlled at a 5/10 on 200 mg gabapentin TID, ibuprofen 600 mg TID and break through tylenol 2 tabs 2 times a day   + osteoporosis diag - under care of Maddie - last vitamin-D level 45  - patient is on alendronate  No updated DEXA scan  Pt has not complaints    The following portions of the patient's history were reviewed and updated as appropriate: allergies, past social history, past surgical history and problem list     Review of Systems   Constitutional: Negative for activity change and appetite change  HENT: Negative  Respiratory: Negative  Cardiovascular: Negative  Negative for chest pain  Gastrointestinal: Negative  Negative for constipation and diarrhea  Genitourinary: Negative  Negative for difficulty urinating  Musculoskeletal: Negative  Skin: Negative  Neurological: Negative  Hematological: Negative  Psychiatric/Behavioral: Negative  Negative for sleep disturbance  The patient is not nervous/anxious  All other systems reviewed and are negative  Objective:      /70 (BP Location: Left arm, Patient Position: Sitting, Cuff Size: Adult)   Pulse 83   Temp 98 °F (36 7 °C) (Oral)   Resp 16   Ht 5' 1" (1 549 m)   Wt 45 4 kg (100 lb)   SpO2 99%   BMI 18 89 kg/m²          Physical Exam   Constitutional: She is oriented to person, place, and time  She appears well-developed and well-nourished  No distress  HENT:   Head: Normocephalic  Right Ear: Tympanic membrane and external ear normal  No decreased hearing is noted  Left Ear: Tympanic membrane and external ear normal  No decreased hearing is noted  Mouth/Throat: Oropharynx is clear and moist    Eyes: Pupils are equal, round, and reactive to light  Conjunctivae are normal    Cardiovascular: Normal rate, regular rhythm, normal heart sounds and intact distal pulses  No murmur heard  Pulmonary/Chest: Effort normal and breath sounds normal  No respiratory distress  Abdominal: Soft  Bowel sounds are normal    Neurological: She is alert and oriented to person, place, and time  No cranial nerve deficit  Skin: Skin is warm and dry  Psychiatric: She has a normal mood and affect   Her behavior is normal

## 2018-12-11 NOTE — PATIENT INSTRUCTIONS
Stop over-the-counter ibuprofen  I increased her gabapentin from 100 mg tabs to 300 mg tabs take 3 times a day -may be a little sleepy for 1-2 days while adjusting to this new dose  Take 2 tabs of Tylenol with gabapentin every 8 hr  Take ibuprofen 600 mg tabs in between gabapentin      Ask Dr Kuhn  for an updated DEXA scan and informed him of year compression fracture

## 2018-12-12 DIAGNOSIS — F41.9 ANXIETY: ICD-10-CM

## 2018-12-12 DIAGNOSIS — G25.81 RLS (RESTLESS LEGS SYNDROME): ICD-10-CM

## 2018-12-12 RX ORDER — NORTRIPTYLINE HYDROCHLORIDE 25 MG/1
CAPSULE ORAL
Qty: 30 CAPSULE | Refills: 5 | Status: SHIPPED | OUTPATIENT
Start: 2018-12-12 | End: 2019-07-24

## 2018-12-12 RX ORDER — NORTRIPTYLINE HYDROCHLORIDE 10 MG/1
CAPSULE ORAL
Qty: 30 CAPSULE | Refills: 5 | Status: SHIPPED | OUTPATIENT
Start: 2018-12-12 | End: 2019-05-29 | Stop reason: SDUPTHER

## 2018-12-19 ENCOUNTER — TELEPHONE (OUTPATIENT)
Dept: FAMILY MEDICINE CLINIC | Facility: CLINIC | Age: 73
End: 2018-12-19

## 2018-12-19 NOTE — TELEPHONE ENCOUNTER
Janis from [address] [place] [address] called to inform us that pt will be discharged from Nursing  Services today, pt will continue with PT      If you have any questions, please call her at [phone]

## 2018-12-20 DIAGNOSIS — M81.0 OSTEOPOROSIS, UNSPECIFIED OSTEOPOROSIS TYPE, UNSPECIFIED PATHOLOGICAL FRACTURE PRESENCE: ICD-10-CM

## 2018-12-20 RX ORDER — ALENDRONATE SODIUM 70 MG/1
TABLET ORAL
Qty: 4 TABLET | Refills: 5 | Status: SHIPPED | OUTPATIENT
Start: 2018-12-20 | End: 2019-05-29

## 2018-12-22 DIAGNOSIS — M81.0 OSTEOPOROSIS, UNSPECIFIED OSTEOPOROSIS TYPE, UNSPECIFIED PATHOLOGICAL FRACTURE PRESENCE: ICD-10-CM

## 2018-12-24 RX ORDER — ALENDRONATE SODIUM 70 MG/1
TABLET ORAL
Qty: 4 TABLET | Refills: 2 | OUTPATIENT
Start: 2018-12-24

## 2019-01-03 DIAGNOSIS — S32.030D CLOSED COMPRESSION FRACTURE OF L3 LUMBAR VERTEBRA WITH ROUTINE HEALING, SUBSEQUENT ENCOUNTER: ICD-10-CM

## 2019-01-03 RX ORDER — IBUPROFEN 600 MG/1
TABLET ORAL
Qty: 90 TABLET | Refills: 0 | Status: SHIPPED | OUTPATIENT
Start: 2019-01-03 | End: 2019-04-18

## 2019-01-04 ENCOUNTER — TRANSCRIBE ORDERS (OUTPATIENT)
Dept: RADIOLOGY | Facility: HOSPITAL | Age: 74
End: 2019-01-04

## 2019-01-04 ENCOUNTER — HOSPITAL ENCOUNTER (OUTPATIENT)
Dept: RADIOLOGY | Facility: HOSPITAL | Age: 74
Discharge: HOME | End: 2019-01-04
Payer: MEDICARE

## 2019-01-04 DIAGNOSIS — S32.030D CLOSED COMPRESSION FRACTURE OF L3 LUMBAR VERTEBRA WITH ROUTINE HEALING, SUBSEQUENT ENCOUNTER: ICD-10-CM

## 2019-01-04 PROCEDURE — 72100 X-RAY EXAM L-S SPINE 2/3 VWS: CPT

## 2019-01-08 ENCOUNTER — OFFICE VISIT (OUTPATIENT)
Dept: NEUROSURGERY | Facility: CLINIC | Age: 74
End: 2019-01-08
Payer: MEDICARE

## 2019-01-08 VITALS
SYSTOLIC BLOOD PRESSURE: 122 MMHG | HEART RATE: 86 BPM | TEMPERATURE: 98.7 F | DIASTOLIC BLOOD PRESSURE: 80 MMHG | HEIGHT: 61 IN | WEIGHT: 100 LBS | BODY MASS INDEX: 18.88 KG/M2

## 2019-01-08 DIAGNOSIS — S32.039D CLOSED FRACTURE OF THIRD LUMBAR VERTEBRA WITH ROUTINE HEALING, UNSPECIFIED FRACTURE MORPHOLOGY, SUBSEQUENT ENCOUNTER: Primary | ICD-10-CM

## 2019-01-08 PROCEDURE — 99213 OFFICE O/P EST LOW 20 MIN: CPT | Performed by: PHYSICIAN ASSISTANT

## 2019-01-08 NOTE — PROGRESS NOTES
Assessment/Plan:       Diagnoses and all orders for this visit:    Closed fracture of third lumbar vertebra with routine healing, unspecified fracture morphology, subsequent encounter  -     X-ray lumbar spine 2 or 3 views; Future        Discussion / Summary: This is a 75 y o  female who presents for follow up for L3 vertebral body fracture  Patient has been maintained in LSO  back brace for approximately 8 weeks  Lumbar spine xray ( 1/4/19 )  was reviewed in detail by Dr Victor M Arias and demonstrates stable loss of height of L3 vertebral body collapse in comparison to previous lumbar xray 12/3/18)  There is mild retropulsion of posterior inferior margin of L3 vertebral stable  Alignment is unremarkable  Reviewed prior L-spine MRI (11/18/18)  --no significant central stenosis  There is normal size and signal within distal cord and conus  Also reviewed prior L-spine CT (11/14/18)  She reports overall improvement in regards to her low back pain  She wishes to continue with conservative management with back brace  She is not interested in surgical intervention  She may remove the brace to shower but otherwise patient advise to wear LSO back brace when head of bed > 45 degrees and out of bed  Informed patient she should not drive in setting of wearing back brace  Advised no strenuous activity  Patient is to refrain from bending / twisting back and refrain from lifting, pulling, or pushing more then 10 lbs  Recommend patient take fall precautions and refrain from activity that increases chance of falling or injury to back  Patient recommended to follow up in 4 weeks with L-spine xray to be completed a few days prior to follow up visit  She was offered but declined referral to Pain Management  Her PCP has been managing her pain medication       Patient is to contact our office or present to hospital Emergency Room if experience worsening or new back pain, sensory or motor change, bladder or bowel dysfunction, or other neurological change  Patient  expressed understanding and agreement     ____________________________________________________________________________________        Subjective:      Patient ID: Walt Fountain is a 75 y o  female who presents for follow up for L3 vertebral body fracture  Patient has been maintained in LSO  back brace for approximately 8 weeks  HPI    In review -- she has PMH including ambulatory dysfunction, arthritis of the left hip with total hip arthroplasty, hypothyroidism, osteoporosis, peripheral arterial disease, and restless legs syndrome  She had presented to [facility] 11/12/18 with LBP and RLE pain  Hospital record indicated she was moving a mattress 3 weeks prior to hospitalization when she felt a crack in her spine  One week later she was trying to get out of bed and slid off the bed  She had back pain that radiated to right leg following that event  During work up she was found to have L3 vertebral fracture  She has L-spine MRI completed -- findings consistent with acute / subacute L3 vertebral body compression deformity  Mild right sided spinal canal and foraminal stenosis L3-L4  Right central annular fissure and protrusion of L5-S1 encroaching on the right lateral recess and mildly indented the right S1 nerve root  No significant findings that warranted neurosurgical intervention at that time  She was fit with [place] LSO brace and recommended outpatient follow up  It is noted that Ms Rivera decided to discontinue wearing the Jižní 80 brace she was issued during hospitalization as she felt it was too bulky and instead has been wearing a Krishna LSO brace that she reports she purchased on-line  Patient reports wearing the [place] LSO brace when upright and out of bed  She describes left and right-sided paralumbar back pain  She reports her low back pain is constant  Overall she reports her low back pain is improved    She currently rates low back pain is 2/10 on pain scale  She reports pain medication does help her back pain  She reports taking Tylenol over-the-counter ES 2 tablets daily, gabapentin 300 mg 1 tablet t i d , and ibuprofen 600 mg Q 8 hr p r n  Leila Muhammad She reports PCP manages her pain medication  Of note she reports she takes prednisone 2 5 mg daily for history of RA -- follows with Dr Martinez Bautista of rheumatology  She denies lower extremity pain currently  However, she does reports she can experience periodic pain down the left or the right lateral thigh/lateral lower leg down there her ankle  She reports she feels this has been grossly stable  This tends to occur in AMs an is alleviated with pain medication  She denies following with a pain management provider  She has history of b/l hip replacements  She reports she received a left hip region injection prior to hip replacement 11/2017  She denies numbness, tingling, or weakness of her lower extremities  She ambulates with a walker  She reports she has been ambulating with walker for 5 years  She reports living at home  She indicates home nursing and home PT services have been completed  Patient reports he continues to complete home exercises on her own  Patient reports she is on Fosamax for history of osteoporosis  She indicates her PCP manages a Fosamax  Patient reports history of urinary and bowel urgency for couple months  She reports if she does not get to the bathroom fast enough she may have an accident  She reports if she goes to the bathroom when she has the urge she she has no issues  The following portions of the patient's history were reviewed and updated as appropriate: allergies, current medications, past family history, past medical history, past social history and past surgical history  Review of Systems   Constitutional: Negative for fever  HENT: Negative  Eyes: Negative for visual disturbance     Respiratory: Negative for shortness of breath  Cardiovascular: Negative for chest pain  Gastrointestinal: Negative for nausea and vomiting  See HPI   Genitourinary:        See HPI   Musculoskeletal: Positive for back pain  Neurological:        See HPI   Psychiatric/Behavioral: Negative for agitation  Objective:      /80 (BP Location: Left arm, Patient Position: Sitting, Cuff Size: Standard)   Pulse 86   Temp 98 7 °F (37 1 °C) (Temporal)   Ht 5' 1" (1 549 m)   Wt 45 4 kg (100 lb)   BMI 18 89 kg/m²          Physical Exam   Constitutional: She is oriented to person, place, and time  She appears well-developed and well-nourished  No distress  Wearing LSO brace   Eyes: Conjunctivae are normal  No scleral icterus  Pulmonary/Chest: Effort normal    Musculoskeletal:        Lumbar back: She exhibits no tenderness and no deformity  High arches of both feet   Neurological: She is alert and oriented to person, place, and time  Psychiatric: She has a normal mood and affect  Neurologic Exam     Mental Status   Oriented to person, place, and time  Motor Exam       Motor:  Shoulder abduction 5/5 bilaterally  Elbow flexion/extension 5/5 bilaterally  Wrist flexion/extension 5/5 bilaterally  Finger  / abduction 5/5 bilaterally  Hip flexion 4/5 bilaterally  (pt  reports she is s/p b/l hip replacement)  Hip abduction/adduction 5/5 bilaterally  Knee flexion left 4+/5 and right 4/5  Knee extension 5/5 bilaterally  Ankle DF/PF 5/5 bilaterally  Great toe DF 4+/5 bilaterally  Sensory Exam     Sensation to pinprick intact b/l upper extremities, torso, and b/l lower extremities  JPS and Vibratory sense intact b/l thumbs and great toes         Gait, Coordination, and Reflexes     Gait  Gait: (Ambulates with walker)    Reflexes   Right plantar: normal  Left plantar: equivocal  Right ankle clonus: absent  Left ankle clonus: absent  Biceps, triceps, brachioradialis reflexes +2-3 bilaterally  Patellar reflexes left +2 and right +2-3  Achilles reflexes left +2 bilaterally  Imaging study:    1/4/19 [facility] L-spine xray -- per report: " LUMBAR SPINE     INDICATION:   S32 [id number]: Wedge compression fracture of third lumbar vertebra, subsequent encounter for fracture with routine healing      COMPARISON:  December 3, 2018     VIEWS:  XR SPINE LUMBAR 2 OR 3 VIEWS INJURY  Images: 2     FINDINGS: Spinal brace  identified     Again noted is a fracture of the L3 vertebra with 70% loss of vertebral height  Mild retropulsion of the posterior inferior margin of the L3 vertebra seen, stable  Alignment is unremarkable      No significant disc space narrowing seen     The pedicles appear intact      Soft tissues are unremarkable    Hip arthroplasty seen  Intrauterine contraceptive device seen  IMPRESSION:     L3 fracture, stable  Unchanged alignment        Workstation performed: [id number] "

## 2019-01-08 NOTE — PATIENT INSTRUCTIONS
May remove back brace to shower  Otherwise wear LSO back brace when head of bed greater then 45 degrees and out of bed  No driving in setting of having to wear back brace  No strenuous activity  Refrain from bending / twisting back  Limit lifting, pulling, and pushing to no more then 10 lbs  Recommend you take fall precautions and refrain from activity that increases chance of falling or injury to back  Have lumbar spine xray completed a few days prior to Neurosurgery follow up visit  Contact our office or present to hospital Emergency Room if experience worsening or new back pain, sensory or motor change, bladder or bowel function change, or other neurological change

## 2019-01-08 NOTE — LETTER
January 9, 2019     Margaret Jose MD  [address] [facility] [address] [facility]    Patient: Maryuri Lema   YOB: 1945   Date of Visit: 1/8/2019       Dear Dr Warner Montez: Thank you for referring Maryuri Lema to me for evaluation  Below are my notes for this consultation  If you have questions, please do not hesitate to call me  I look forward to following your patient along with you  Sincerely,        Chasity Livingston PA-C        CC: No Recipients  Chasity Livingston PA-C  1/9/2019  5:01 AM  Sign at close encounter  Assessment/Plan:       Diagnoses and all orders for this visit:    Closed fracture of third lumbar vertebra with routine healing, unspecified fracture morphology, subsequent encounter  -     X-ray lumbar spine 2 or 3 views; Future        Discussion / Summary: This is a 75 y o  female who presents for follow up for L3 vertebral body fracture  Patient has been maintained in LSO  back brace for approximately 8 weeks  Lumbar spine xray ( 1/4/19 )  was reviewed in detail by Dr Kiya Duran and demonstrates stable loss of height of L3 vertebral body collapse in comparison to previous lumbar xray 12/3/18)  There is mild retropulsion of posterior inferior margin of L3 vertebral stable  Alignment is unremarkable  Reviewed prior L-spine MRI (11/18/18)  --no significant central stenosis  There is normal size and signal within distal cord and conus  Also reviewed prior L-spine CT (11/14/18)  She reports overall improvement in regards to her low back pain  She wishes to continue with conservative management with back brace  She is not interested in surgical intervention  She may remove the brace to shower but otherwise patient advise to wear LSO back brace when head of bed > 45 degrees and out of bed  Informed patient she should not drive in setting of wearing back brace  Advised no strenuous activity   Patient is to refrain from bending / twisting back and refrain from lifting, pulling, or pushing more then 10 lbs  Recommend patient take fall precautions and refrain from activity that increases chance of falling or injury to back  Patient recommended to follow up in 4 weeks with L-spine xray to be completed a few days prior to follow up visit  She was offered but declined referral to Pain Management  Her PCP has been managing her pain medication  Patient is to contact our office or present to hospital Emergency Room if experience worsening or new back pain, sensory or motor change, bladder or bowel dysfunction, or other neurological change  Patient  expressed understanding and agreement     ____________________________________________________________________________________        Subjective:      Patient ID: Bernardo Irizarry is a 75 y o  female who presents for follow up for L3 vertebral body fracture  Patient has been maintained in LSO  back brace for approximately 8 weeks  HPI    In review -- she has PMH including ambulatory dysfunction, arthritis of the left hip with total hip arthroplasty, hypothyroidism, osteoporosis, peripheral arterial disease, and restless legs syndrome  She had presented to [facility] 11/12/18 with LBP and RLE pain  Hospital record indicated she was moving a mattress 3 weeks prior to hospitalization when she felt a crack in her spine  One week later she was trying to get out of bed and slid off the bed  She had back pain that radiated to right leg following that event  During work up she was found to have L3 vertebral fracture  She has L-spine MRI completed -- findings consistent with acute / subacute L3 vertebral body compression deformity  Mild right sided spinal canal and foraminal stenosis L3-L4  Right central annular fissure and protrusion of L5-S1 encroaching on the right lateral recess and mildly indented the right S1 nerve root     No significant findings that warranted neurosurgical intervention at that time   She was fit with [place] LSO brace and recommended outpatient follow up  It is noted that Ms Rivera decided to discontinue wearing the [address] brace she was issued during hospitalization as she felt it was too bulky and instead has been wearing a [employer] LSO brace that she reports she purchased on-line  Patient reports wearing the [place] LSO brace when upright and out of bed  She describes left and right-sided paralumbar back pain  She reports her low back pain is constant  Overall she reports her low back pain is improved  She currently rates low back pain is 2/10 on pain scale  She reports pain medication does help her back pain  She reports taking Tylenol over-the-counter ES 2 tablets daily, gabapentin 300 mg 1 tablet t i d , and ibuprofen 600 mg Q 8 hr p r n  Dequan Handy She reports PCP manages her pain medication  Of note she reports she takes prednisone 2 5 mg daily for history of RA -- follows with Dr César Lugo of rheumatology  She denies lower extremity pain currently  However, she does reports she can experience periodic pain down the left or the right lateral thigh/lateral lower leg down there her ankle  She reports she feels this has been grossly stable  This tends to occur in AMs an is alleviated with pain medication  She denies following with a pain management provider  She has history of b/l hip replacements  She reports she received a left hip region injection prior to hip replacement 11/2017  She denies numbness, tingling, or weakness of her lower extremities  She ambulates with a walker  She reports she has been ambulating with walker for 5 years  She reports living at home  She indicates home nursing and home PT services have been completed  Patient reports he continues to complete home exercises on her own  Patient reports she is on Fosamax for history of osteoporosis  She indicates her PCP manages a Fosamax      Patient reports history of urinary and bowel urgency for couple months  She reports if she does not get to the bathroom fast enough she may have an accident  She reports if she goes to the bathroom when she has the urge she she has no issues  The following portions of the patient's history were reviewed and updated as appropriate: allergies, current medications, past family history, past medical history, past social history and past surgical history  Review of Systems   Constitutional: Negative for fever  HENT: Negative  Eyes: Negative for visual disturbance  Respiratory: Negative for shortness of breath  Cardiovascular: Negative for chest pain  Gastrointestinal: Negative for nausea and vomiting  See HPI   Genitourinary:        See HPI   Musculoskeletal: Positive for back pain  Neurological:        See HPI   Psychiatric/Behavioral: Negative for agitation  Objective:      /80 (BP Location: Left arm, Patient Position: Sitting, Cuff Size: Standard)   Pulse 86   Temp 98 7 °F (37 1 °C) (Temporal)   Ht 5' 1" (1 549 m)   Wt 45 4 kg (100 lb)   BMI 18 89 kg/m²           Physical Exam   Constitutional: She is oriented to person, place, and time  She appears well-developed and well-nourished  No distress  Wearing LSO brace   Eyes: Conjunctivae are normal  No scleral icterus  Pulmonary/Chest: Effort normal    Musculoskeletal:        Lumbar back: She exhibits no tenderness and no deformity  High arches of both feet   Neurological: She is alert and oriented to person, place, and time  Psychiatric: She has a normal mood and affect  Neurologic Exam     Mental Status   Oriented to person, place, and time  Motor Exam       Motor:  Shoulder abduction 5/5 bilaterally  Elbow flexion/extension 5/5 bilaterally  Wrist flexion/extension 5/5 bilaterally  Finger  / abduction 5/5 bilaterally  Hip flexion 4/5 bilaterally  (pt  reports she is s/p b/l hip replacement)    Hip abduction/adduction 5/5 bilaterally  Knee flexion left 4+/5 and right 4/5  Knee extension 5/5 bilaterally  Ankle DF/PF 5/5 bilaterally  Great toe DF 4+/5 bilaterally  Sensory Exam     Sensation to pinprick intact b/l upper extremities, torso, and b/l lower extremities  JPS and Vibratory sense intact b/l thumbs and great toes  Gait, Coordination, and Reflexes     Gait  Gait: (Ambulates with walker)    Reflexes   Right plantar: normal  Left plantar: equivocal  Right ankle clonus: absent  Left ankle clonus: absent  Biceps, triceps, brachioradialis reflexes +2-3 bilaterally  Patellar reflexes left +2 and right +2-3  Achilles reflexes left +2 bilaterally  Imaging study:    1/4/19 [facility] L-spine xray -- per report: " LUMBAR SPINE     INDICATION:   S32 [id number]: Wedge compression fracture of third lumbar vertebra, subsequent encounter for fracture with routine healing      COMPARISON:  December 3, 2018     VIEWS:  XR SPINE LUMBAR 2 OR 3 VIEWS INJURY  Images: 2     FINDINGS: Spinal brace  identified     Again noted is a fracture of the L3 vertebra with 70% loss of vertebral height  Mild retropulsion of the posterior inferior margin of the L3 vertebra seen, stable  Alignment is unremarkable      No significant disc space narrowing seen     The pedicles appear intact      Soft tissues are unremarkable    Hip arthroplasty seen  Intrauterine contraceptive device seen  IMPRESSION:     L3 fracture, stable  Unchanged alignment        Workstation performed: [id number] "

## 2019-01-17 DIAGNOSIS — E03.9 HYPOTHYROIDISM, UNSPECIFIED TYPE: ICD-10-CM

## 2019-01-17 NOTE — TELEPHONE ENCOUNTER
Call pt: Please find out what dose she is taking then see if she needs a refill then send to covering provider tomorrow

## 2019-01-17 NOTE — TELEPHONE ENCOUNTER
Spoke with pharmacist he stated that on 11/28 she was disp 50mg 1 po daily , disp 14 with no refills, I asked if she has tried to recently fill this medication and he stated no

## 2019-01-21 RX ORDER — LEVOTHYROXINE SODIUM 0.03 MG/1
TABLET ORAL
Qty: 30 TABLET | Refills: 5 | Status: SHIPPED | OUTPATIENT
Start: 2019-01-21 | End: 2019-03-05 | Stop reason: SDUPTHER

## 2019-01-25 DIAGNOSIS — S32.030D CLOSED COMPRESSION FRACTURE OF L3 LUMBAR VERTEBRA WITH ROUTINE HEALING, SUBSEQUENT ENCOUNTER: ICD-10-CM

## 2019-01-28 RX ORDER — IBUPROFEN 600 MG/1
TABLET ORAL
Qty: 90 TABLET | Refills: 0 | OUTPATIENT
Start: 2019-01-28

## 2019-01-28 NOTE — TELEPHONE ENCOUNTER
Refill too soon - it seems that she is taking NSAIDS daily- should not be taking daily as there are many side effects that can  Affect her GI, liver and blood function  Please take OTC 2 tylenols every 8 hrs as needed for pain instead of ibuprofen - if has questions concerns - make appointment

## 2019-02-03 DIAGNOSIS — S32.030D CLOSED COMPRESSION FRACTURE OF L3 LUMBAR VERTEBRA WITH ROUTINE HEALING, SUBSEQUENT ENCOUNTER: ICD-10-CM

## 2019-02-04 RX ORDER — GABAPENTIN 300 MG/1
CAPSULE ORAL
Qty: 90 CAPSULE | Refills: 3 | Status: SHIPPED | OUTPATIENT
Start: 2019-02-04 | End: 2019-04-18 | Stop reason: SDUPTHER

## 2019-02-05 ENCOUNTER — TRANSCRIBE ORDERS (OUTPATIENT)
Dept: RADIOLOGY | Facility: HOSPITAL | Age: 74
End: 2019-02-05

## 2019-02-05 ENCOUNTER — HOSPITAL ENCOUNTER (OUTPATIENT)
Dept: RADIOLOGY | Facility: HOSPITAL | Age: 74
Discharge: HOME | End: 2019-02-05
Payer: MEDICARE

## 2019-02-05 DIAGNOSIS — F41.9 ANXIETY: ICD-10-CM

## 2019-02-05 DIAGNOSIS — S32.039D CLOSED FRACTURE OF THIRD LUMBAR VERTEBRA WITH ROUTINE HEALING, UNSPECIFIED FRACTURE MORPHOLOGY, SUBSEQUENT ENCOUNTER: ICD-10-CM

## 2019-02-05 PROCEDURE — 72100 X-RAY EXAM L-S SPINE 2/3 VWS: CPT

## 2019-02-06 RX ORDER — DIAZEPAM 5 MG/1
TABLET ORAL
Qty: 60 TABLET | Refills: 0 | Status: SHIPPED | OUTPATIENT
Start: 2019-02-06 | End: 2019-04-18

## 2019-02-07 ENCOUNTER — OFFICE VISIT (OUTPATIENT)
Dept: NEUROSURGERY | Facility: CLINIC | Age: 74
End: 2019-02-07
Payer: MEDICARE

## 2019-02-07 ENCOUNTER — HOSPITAL ENCOUNTER (OUTPATIENT)
Dept: RADIOLOGY | Facility: HOSPITAL | Age: 74
Discharge: HOME | End: 2019-02-07
Payer: MEDICARE

## 2019-02-07 VITALS
HEIGHT: 61 IN | WEIGHT: 104 LBS | HEART RATE: 81 BPM | SYSTOLIC BLOOD PRESSURE: 139 MMHG | BODY MASS INDEX: 19.63 KG/M2 | RESPIRATION RATE: 16 BRPM | DIASTOLIC BLOOD PRESSURE: 79 MMHG | TEMPERATURE: 99.1 F

## 2019-02-07 DIAGNOSIS — S32.030D CLOSED COMPRESSION FRACTURE OF L3 LUMBAR VERTEBRA WITH ROUTINE HEALING, SUBSEQUENT ENCOUNTER: ICD-10-CM

## 2019-02-07 DIAGNOSIS — S32.030D CLOSED COMPRESSION FRACTURE OF L3 LUMBAR VERTEBRA WITH ROUTINE HEALING, SUBSEQUENT ENCOUNTER: Primary | ICD-10-CM

## 2019-02-07 PROCEDURE — 99213 OFFICE O/P EST LOW 20 MIN: CPT | Performed by: PHYSICIAN ASSISTANT

## 2019-02-07 PROCEDURE — 72110 X-RAY EXAM L-2 SPINE 4/>VWS: CPT

## 2019-02-07 RX ORDER — METHOTREXATE 2.5 MG/1
TABLET ORAL
COMMUNITY
End: 2020-09-01

## 2019-02-07 NOTE — PROGRESS NOTES
Assessment/Plan:    Closed compression fracture of L3 lumbar vertebra with routine healing, subsequent encounter  -     XR spine lumbar minimum 4 views non injury; Future        Discussion / Summary: This is a 75 y o  female who presents for follow up of L3 compression fracture  Ms Masood Allen has been maintained in LSO back brace [place]) for approximately 12 weeks  Lumbar spine xray (2/5/18 )  was reviewed in detail by Dr Nevaeh Moore and the L3 compression deformity is stable compared to prior lumbar xray (1/4/19 and 12/3/18)  Also reviewed lumbar spine MRI (11/18/18)  Ms Masood Allen is recommended to go for lumbar flexion/extension xray at [facility] to evaluate dynamic stability  Patient informed she will have to take the back brace off for xray and reapply the back brace after xray  Patient is to call our office after completion of lumbar flex/ext xray for review  Patient may remove LSO back brace to shower but is otherwise to continue wearing back brace when head of bed greater then 45 degrees and out of bed until otherwise notified by neurosurgery office  Advised no driving in setting of having to wear back brace  Advised no strenuous activity  Recommend patient refrain from bending / twisting back  Recommend patient limit lifting, pulling, or pushing to no more then 10 lbs  Recommend patient take fall precautions and refrain from activity that increases chance of falling or injury to back  Ms Masood Allen is to call our office after lumbar flex/ext xray completed for review of xray  The plan moving forward will be to wean Ms Rivera out of back brace if there is no instability on lumbar flex/ext xray and further neurosurgical follow up may be on prn basis  Discussed w/ Ms Rivera that the radicular like pain down legs (LLE > RLE) to ankle region in AMs could possibly be associated with history of lower lumbar degenerative changes    Explained she may benefit seeing a Pain Management provider for consideration of lumbar TOY  Offered Ms Rivera referral to Pain Management provider but Ms Rivera declined referral       She reports she rather continue ongoing follow up with her Rheumatologist (Dr Candace White)  She reports she is on methotrexate and prednisone for RA  Also recommend she continue follow up with her established Rheumatologist / Primary Care Provider for evaluation/[place] of osteoporosis  She reports she takes alendronate weekly  Patient is to contact our office or present to hospital Emergency Room if experience worsening or new bella pain, leg pain, sensory or motor change, bladder or bowel function change, or other neurological change  Patient expressed understanding and agreement       __________________________________________________________________________________    Subjective:      Patient ID: Javon Kennedy is a 75 y o  female who presents for follow up of L3 compression fracture  Ms Leidy Soler has [facility] for approximately 12 weeks  She is s/p L-spine xray 2/5/19  In review -- Ms Rivera has PMH including ambulatory dysfunction, rheumatoid arthritis, arthritis of the left hip with total hip arthroplasty, hypothyroidism, osteoporosis, peripheral arterial disease, and restless legs syndrome  She had presented to [facility] 11/12/18 with LBP and RLE pain    Hospital record indicated she was moving a mattress 3 weeks prior to hospitalization when she felt a crack in her spine   One week later she was trying to get out of bed and slid off the bed  She had back pain that radiated to right leg following that event   During work up she was found to have L3 vertebral fracture   She has L-spine MRI completed -- findings consistent with acute / subacute L3 vertebral body compression deformity   Mild right sided spinal canal and foraminal stenosis L3-L4    Right central annular fissure and protrusion of L5-S1 encroaching on the right lateral recess and mildly indented the right S1 nerve root    No significant findings that warranted neurosurgical intervention at that time  Jorge Dacosta was fit with [place] LSO brace and recommended outpatient follow up  It is noted that Ms Rivera decided to discontinue wearing the [address] brace she was issued during hospitalization as she felt it was too bulky and instead has been wearing a [employer] LSO brace that she reportedly purchased on-line  She was last see in office 1/8/19  HPI Ms Rivera reports compliance with wearing [employer] LSO brace when upright and out of bed  She reports her low back pain is overall improved in interval since last office visit  She currently reports left-sided paralumbar region back pain rated 4-5/10 on pain scale  Pain medication helps (Tylenol extra-strength 2 tablets daily, gabapentin 300 mg 1 tablet t i d , ibuprofen 200 mg 3 tablets t i d  P r n )  Of note -- she takes prednisone 2 5 mg daily and Methotrexate for history of RA -- follows with Dr Hernandez Landaverde of Rheumatology  She denies lower extremity pain currently  However she does report she can experience periodically pain down the left or right lateral thigh/lateral lower leg down to her ankle in the mornings  She notices this more so in LLE then RLE  She reports taking ibuprofen and gabapentin alleviates the leg pain  She has previously declined to see a pain management provider  She has history of bilateral hip replacements  She reports she received a left hip region injection prior to hip replacement in 11/2017  She denies numbness, tingling, weakness of her lower extremities  She ambulates with a walker  She reports she has been ambulating with a walker for 5+ years      She reports she takes alendronate weekly which she reports is managed by Dr Hernandez Landaverde       The following portions of the patient's history were reviewed and updated as appropriate: allergies, current medications, past family history, past medical history, past social history and past surgical history  Review of Systems      Objective:      /79 (BP Location: Right arm, Patient Position: Sitting, Cuff Size: Standard)   Pulse 81   Temp 99 1 °F (37 3 °C) (Tympanic)   Resp 16   Ht 5' 1" (1 549 m)   Wt 47 2 kg (104 lb)   BMI 19 65 kg/m²          Physical Exam   Constitutional: She is oriented to person, place, and time  She appears well-developed and well-nourished  No distress  Eyes: Conjunctivae are normal  No scleral icterus  Cardiovascular: Normal rate  Pulmonary/Chest: Effort normal    Musculoskeletal:        Lumbar back: She exhibits no bony tenderness and no deformity  Neurological: She is alert and oriented to person, place, and time  Skin: Skin is warm and dry  Psychiatric: She has a normal mood and affect  Her speech is normal        Neurologic Exam     Mental Status   Oriented to person, place, and time  Speech: speech is normal   Level of consciousness: alert    Motor Exam     Motor:  Shoulder abduction 5/5 bilaterally  Elbow flexion/extension 5/5 bilaterally  Wrist flexion/extension 5/5 bilaterally  Finger  / abduction 5/5 bilaterally  Hip flexion 4+/5 bilaterally (s/p bilateral hip replacements)  Hip abduction/adduction 5/5 bilaterally  Knee flexion 4+/5 bilaterally  Knee extension 5/5 bilaterally  Ankle DF/PF 5/5 bilaterally  Great toe DF 4+/5 bilaterally  Great toe DF 5/5 bilaterally  Sensory Exam     Sensation to pinprick intact b/l upper extremities, torso, and b/l lower extremities  JPS and Vibratory sense intact b/l thumbs and great toes  Gait, Coordination, and Reflexes     Gait  Gait: (Ambulates steady with roller walker )    Reflexes   Right plantar: normal  Left plantar: normal  Right ankle clonus: absent  Left ankle clonus: absent  Biceps, triceps, brachioradialis, and patellar reflexes are +2 to 3 bilaterally  Achilles reflex left +1 and right +2  Imaging study:    2/5/18 [facility] L-spine xray -- per report:  " LUMBAR SPINE     INDICATION:   S32 039D: Unspecified fracture of third lumbar vertebra, subsequent encounter for fracture with routine healing      COMPARISON:  Lumbar spine x-ray dated 4 2019      VIEWS:  XR SPINE LUMBAR 2 OR 3 VIEWS INJURY        FINDINGS:     Again noted is severe compression deformity of the L3 vertebral body, not significantly changed in appearance from the prior exam   There is stable mild retropulsion at the posterior inferior aspect of the L3 vertebral body  No new fractures are   identified      Alignment is unremarkable      Mild multilevel spondylotic degenerative changes of the lumbar spine are again noted      The pedicles appear intact      Again noted is a posterior back brace  Postoperative changes of prior bilateral hip arthroplasty are present    There is a stable intrauterine device      IMPRESSION:     Stable severe compression deformity of the L3 vertebral body      Stable alignment      No new fractures         Workstation performed: [id number]  "

## 2019-02-07 NOTE — LETTER
February 8, 2019     Jose Francisco Noyola MD  [address] [facility] [address] [facility]    Patient: Walt Fountain   YOB: 1945   Date of Visit: 2/7/2019       Dear Dr Hampton Schilder: Thank you for referring Walt Fountain to me for evaluation  Below are my notes for this consultation  If you have questions, please do not hesitate to call me  I look forward to following your patient along with you  Sincerely,        Naresh Singleton PA-C        CC: No Recipients  Naresh Singleton PA-C  2/8/2019  7:12 AM  Sign at close encounter  Assessment/Plan:    Closed compression fracture of L3 lumbar vertebra with routine healing, subsequent encounter  -     XR spine lumbar minimum 4 views non injury; Future        Discussion / Summary: This is a 75 y o  female who presents for follow up of L3 compression fracture  Ms Bonny Lyons has been maintained in LSO back brace Paulette Shutter) for approximately 12 weeks  Lumbar spine xray (2/5/18 )  was reviewed in detail by Dr Shankar Zarco and the L3 compression deformity is stable compared to prior lumbar xray (1/4/19 and 12/3/18)  Also reviewed lumbar spine MRI (11/18/18)  Ms Bonny Lyons is recommended to go for lumbar flexion/extension xray at [facility] to evaluate dynamic stability  Patient informed she will have to take the back brace off for xray and reapply the back brace after xray  Patient is to call our office after completion of lumbar flex/ext xray for review  Patient may remove LSO back brace to shower but is otherwise to continue wearing back brace when head of bed greater then 45 degrees and out of bed until otherwise notified by neurosurgery office  Advised no driving in setting of having to wear back brace  Advised no strenuous activity  Recommend patient refrain from bending / twisting back  Recommend patient limit lifting, pulling, or pushing to no more then 10 lbs      Recommend patient take fall precautions and refrain from activity that increases chance of falling or injury to back  Ms Karla Loving is to call our office after lumbar flex/ext xray completed for review of xray  The plan moving forward will be to wean Ms Rivera out of back brace if there is no instability on lumbar flex/ext xray and further neurosurgical follow up may be on prn basis  Discussed w/ Ms Rivera that the radicular like pain down legs (LLE > RLE) to ankle region in AMs could possibly be associated with history of lower lumbar degenerative changes  Explained she may benefit seeing a Pain Management provider for consideration of lumbar TOY  Offered Ms Rivera referral to Pain Management provider but Ms Rivera declined referral       She reports she rather continue ongoing follow up with her Rheumatologist (Dr Gilbert Al)  She reports she is on methotrexate and prednisone for RA  Also recommend she continue follow up with her established Rheumatologist / Primary Care Provider for evaluation/managenet of osteoporosis  She reports she takes alendronate weekly  Patient is to contact our office or present to hospital Emergency Room if experience worsening or new bella pain, leg pain, sensory or motor change, bladder or bowel function change, or other neurological change  Patient expressed understanding and agreement       __________________________________________________________________________________    Subjective:      Patient ID: Ruben Vieyra is a 75 y o  female who presents for follow up of L3 compression fracture  Ms Karla Loving has been maintained in LSO back brace Lieutenant Coma) for approximately 12 weeks  She is s/p L-spine xray 2/5/19  In review -- Ms Rivera has PMH including ambulatory dysfunction, rheumatoid arthritis, arthritis of the left hip with total hip arthroplasty, hypothyroidism, osteoporosis, peripheral arterial disease, and restless legs syndrome   She had presented to [facility] 11/12/18 with LBP and RLE pain    Hospital record indicated she was moving a mattress 3 weeks prior to hospitalization when she felt a crack in her spine   One week later she was trying to get out of bed and slid off the bed  She had back pain that radiated to right leg following that event   During work up she was found to have L3 vertebral fracture  She has L-spine MRI completed -- findings consistent with acute / subacute L3 vertebral body compression deformity   Mild right sided spinal canal and foraminal stenosis L3-L4    Right central annular fissure and protrusion of L5-S1 encroaching on the right lateral recess and mildly indented the right S1 nerve root    No significant findings that warranted neurosurgical intervention at that time  Ella Salcido was fit with Delong Houston LSO brace and recommended outpatient follow up  It is noted that Ms Rivera decided to discontinue wearing the [address] brace she was issued during hospitalization as she felt it was too bulky and instead has been wearing a Krishna LSO brace that she reportedly purchased on-line  She was last see in office 1/8/19  HPI Ms Rivera reports compliance with wearing Wolff LSO brace when upright and out of bed  She reports her low back pain is overall improved in interval since last office visit  She currently reports left-sided paralumbar region back pain rated 4-5/10 on pain scale  Pain medication helps (Tylenol extra-strength 2 tablets daily, gabapentin 300 mg 1 tablet t i d , ibuprofen 200 mg 3 tablets t i d  P r n )  Of note -- she takes prednisone 2 5 mg daily and Methotrexate for history of RA -- follows with Dr Kuhn  of Rheumatology  She denies lower extremity pain currently  However she does report she can experience periodically pain down the left or right lateral thigh/lateral lower leg down to her ankle in the mornings  She notices this more so in LLE then RLE  She reports taking ibuprofen and gabapentin alleviates the leg pain       She has previously declined to see a pain management provider  She has history of bilateral hip replacements  She reports she received a left hip region injection prior to hip replacement in 11/2017  She denies numbness, tingling, weakness of her lower extremities  She ambulates with a walker  She reports she has been ambulating with a walker for 5+ years  She reports she takes alendronate weekly which she reports is managed by Dr Johnie Fothergill       The following portions of the patient's history were reviewed and updated as appropriate: allergies, current medications, past family history, past medical history, past social history and past surgical history  Review of Systems      Objective:      /79 (BP Location: Right arm, Patient Position: Sitting, Cuff Size: Standard)   Pulse 81   Temp 99 1 °F (37 3 °C) (Tympanic)   Resp 16   Ht 5' 1" (1 549 m)   Wt 47 2 kg (104 lb)   BMI 19 65 kg/m²           Physical Exam   Constitutional: She is oriented to person, place, and time  She appears well-developed and well-nourished  No distress  Eyes: Conjunctivae are normal  No scleral icterus  Cardiovascular: Normal rate  Pulmonary/Chest: Effort normal    Musculoskeletal:        Lumbar back: She exhibits no bony tenderness and no deformity  Neurological: She is alert and oriented to person, place, and time  Skin: Skin is warm and dry  Psychiatric: She has a normal mood and affect  Her speech is normal        Neurologic Exam     Mental Status   Oriented to person, place, and time  Speech: speech is normal   Level of consciousness: alert    Motor Exam     Motor:  Shoulder abduction 5/5 bilaterally  Elbow flexion/extension 5/5 bilaterally  Wrist flexion/extension 5/5 bilaterally  Finger  / abduction 5/5 bilaterally  Hip flexion 4+/5 bilaterally (s/p bilateral hip replacements)  Hip abduction/adduction 5/5 bilaterally  Knee flexion 4+/5 bilaterally  Knee extension 5/5 bilaterally    Ankle DF/PF 5/5 bilaterally  Great toe DF 4+/5 bilaterally  Great toe DF 5/5 bilaterally  Sensory Exam     Sensation to pinprick intact b/l upper extremities, torso, and b/l lower extremities  JPS and Vibratory sense intact b/l thumbs and great toes  Gait, Coordination, and Reflexes     Gait  Gait: (Ambulates steady with roller walker )    Reflexes   Right plantar: normal  Left plantar: normal  Right ankle clonus: absent  Left ankle clonus: absent  Biceps, triceps, brachioradialis, and patellar reflexes are +2 to 3 bilaterally  Achilles reflex left +1 and right +2  Imaging study:    2/5/18 [facility]) L-spine xray -- per report:  " LUMBAR SPINE     INDICATION:   S32 [id number]: Unspecified fracture of third lumbar vertebra, subsequent encounter for fracture with routine healing      COMPARISON:  Lumbar spine x-ray dated 4 2019      VIEWS:  XR SPINE LUMBAR 2 OR 3 VIEWS INJURY        FINDINGS:     Again noted is severe compression deformity of the L3 vertebral body, not significantly changed in appearance from the prior exam   There is stable mild retropulsion at the posterior inferior aspect of the L3 vertebral body  No new fractures are   identified      Alignment is unremarkable      Mild multilevel spondylotic degenerative changes of the lumbar spine are again noted      The pedicles appear intact      Again noted is a posterior back brace  Postoperative changes of prior bilateral hip arthroplasty are present    There is a stable intrauterine device      IMPRESSION:     Stable severe compression deformity of the L3 vertebral body      Stable alignment      No new fractures         Workstation performed: [id number]  "

## 2019-02-07 NOTE — PATIENT INSTRUCTIONS
Please go for lumbar flexion/extension xray at [facility] in the next few days  You will need to take the back brace off for the xray and to reapply the back brace after xray  Please call our office after lumbar flexion/ extension xray completed for review  You may remove back brace to shower  Otherwise continue wearing LSO back brace when head of bed greater than 45 degrees and out of bed until otherwise notified by neurosurgery office  No driving in setting of having to wear back brace  No strenuous activity  Recommend you refrain from bending / twisting back  Recommend you limit lifting, pulling, or pushing to no more then 10 lbs  Recommend you take fall precautions and refrain from activity that increases chance of falling or injury to back  Recommend you continue follow up with your established Rheumatologist / Primary Care Provider for evaluation/managenet of osteoporosis  Contact our office or present to hospital Emergency Room if experience worsening or new back pain, leg pain, sensory or motor change, bladder or bowel function change, or other neurological change

## 2019-02-12 ENCOUNTER — TELEPHONE (OUTPATIENT)
Dept: NEUROSURGERY | Facility: CLINIC | Age: 74
End: 2019-02-12

## 2019-02-12 NOTE — TELEPHONE ENCOUNTER
Ms Yessica Jarrell had requested lumbar flex/ext xray completed as ordered at office visit 2/7/19  Please see 2/7/19 L-spine flex/ext xray in PACs  Do you agree no instability on 2/7/19 lumbar flex/ext xray and that she may wean out of back brace, pursue PT once out of back brace in 2 weeks if she wishes, and follow up with our office on prn basis?

## 2019-02-14 NOTE — TELEPHONE ENCOUNTER
Called Ms  Colon at listed home number -- Ilda Query picked up who reports Ms  Colon is not available currently  Informed Ilda Query will try to touch base w/ Ms  Colon tomorrow

## 2019-02-15 NOTE — TELEPHONE ENCOUNTER
Spoke with Dulce Maria Farfan at home#  Informed Dulce Maria Farfan that Dr Brent Gibson agreed there is no instability on the 2/7/19 lumbar flex/ext xray and that she may wean out of back brace, pursue PT once out of back brace in 2 weeks if she wishes, and that further follow up with our office may be on prn basis  Discussed with Dulce Maria Farfan how to wean out of back brace by taking the back brace off for short periods of time (ie  30-45minutes) a few times per day (ie  About three times per day) and each day moving forward gradually increase the amount of time she has the back brace off until fully out of back brace in 2 weeks  She may complete range of motion and activity as tolerated from neurosurgical standpoint once out of back brace  She was offered referral for PT to begin in 2 weeks (after wean out of back brace) but she declined PT referral  She reports she will contact our office if she decides to pursue PT  She is agreeable with prn follow up with our office  Patient informed to contact our office or present to hospital Emergency Room if experience worsening or new back / leg pain, sensory or motor change, bladder or bowel function change, or other neurological change

## 2019-03-04 PROBLEM — M05.79 RHEUMATOID ARTHRITIS INVOLVING MULTIPLE SITES WITH POSITIVE RHEUMATOID FACTOR (HCC): Status: ACTIVE | Noted: 2019-03-04

## 2019-03-05 DIAGNOSIS — E03.9 HYPOTHYROIDISM, UNSPECIFIED TYPE: ICD-10-CM

## 2019-03-05 RX ORDER — LEVOTHYROXINE SODIUM 0.03 MG/1
TABLET ORAL
Qty: 30 TABLET | Refills: 1 | Status: SHIPPED | OUTPATIENT
Start: 2019-03-05 | End: 2019-04-18 | Stop reason: SDUPTHER

## 2019-03-11 DIAGNOSIS — F41.9 ANXIETY: ICD-10-CM

## 2019-03-11 RX ORDER — DIAZEPAM 5 MG/1
TABLET ORAL
Qty: 60 TABLET | Refills: 0 | OUTPATIENT
Start: 2019-03-11

## 2019-03-12 RX ORDER — GABAPENTIN 100 MG/1
CAPSULE ORAL
Qty: 56 CAPSULE | Refills: 0 | OUTPATIENT
Start: 2019-03-12

## 2019-03-12 RX ORDER — DIAZEPAM 5 MG/1
TABLET ORAL
Qty: 60 TABLET | Refills: 0 | OUTPATIENT
Start: 2019-03-12

## 2019-03-13 DIAGNOSIS — F41.9 ANXIETY: ICD-10-CM

## 2019-03-13 DIAGNOSIS — S32.030D CLOSED COMPRESSION FRACTURE OF L3 LUMBAR VERTEBRA WITH ROUTINE HEALING, SUBSEQUENT ENCOUNTER: ICD-10-CM

## 2019-03-13 RX ORDER — GABAPENTIN 300 MG/1
CAPSULE ORAL
Qty: 90 CAPSULE | Refills: 3 | OUTPATIENT
Start: 2019-03-13

## 2019-03-13 RX ORDER — DIAZEPAM 5 MG/1
5 TABLET ORAL EVERY 12 HOURS PRN
Qty: 60 TABLET | Refills: 0 | OUTPATIENT
Start: 2019-03-13

## 2019-03-13 NOTE — TELEPHONE ENCOUNTER
This has been already addressed by Dr Gisella Santana - please read the notes in EMR  She is new to me  Gabapentin does not need a refill

## 2019-03-21 DIAGNOSIS — F41.9 ANXIETY: ICD-10-CM

## 2019-03-21 RX ORDER — DIAZEPAM 5 MG/1
5 TABLET ORAL EVERY 12 HOURS PRN
Qty: 60 TABLET | Refills: 0 | OUTPATIENT
Start: 2019-03-21

## 2019-03-21 NOTE — TELEPHONE ENCOUNTER
Last refill was 2/06/19 for 60 tabs and prev refill in 11/23/19 - per PDMP -so not using daily  Will address at upcoming visit

## 2019-03-22 ENCOUNTER — TELEPHONE (OUTPATIENT)
Dept: FAMILY MEDICINE CLINIC | Facility: CLINIC | Age: 74
End: 2019-03-22

## 2019-03-28 DIAGNOSIS — F41.9 ANXIETY: ICD-10-CM

## 2019-03-28 RX ORDER — DIAZEPAM 5 MG/1
TABLET ORAL
Qty: 60 TABLET | Refills: 0 | OUTPATIENT
Start: 2019-03-28

## 2019-04-18 ENCOUNTER — OFFICE VISIT (OUTPATIENT)
Dept: FAMILY MEDICINE CLINIC | Facility: CLINIC | Age: 74
End: 2019-04-18
Payer: MEDICARE

## 2019-04-18 VITALS
HEIGHT: 61 IN | DIASTOLIC BLOOD PRESSURE: 66 MMHG | HEART RATE: 80 BPM | TEMPERATURE: 98.1 F | OXYGEN SATURATION: 97 % | SYSTOLIC BLOOD PRESSURE: 118 MMHG | RESPIRATION RATE: 16 BRPM | BODY MASS INDEX: 19.63 KG/M2 | WEIGHT: 104 LBS

## 2019-04-18 DIAGNOSIS — G89.4 CHRONIC PAIN SYNDROME: Primary | ICD-10-CM

## 2019-04-18 DIAGNOSIS — I73.9 PVD (PERIPHERAL VASCULAR DISEASE) (HCC): ICD-10-CM

## 2019-04-18 DIAGNOSIS — E03.9 HYPOTHYROIDISM, UNSPECIFIED TYPE: ICD-10-CM

## 2019-04-18 DIAGNOSIS — R26.2 AMBULATORY DYSFUNCTION: ICD-10-CM

## 2019-04-18 DIAGNOSIS — F41.9 ANXIETY: Chronic | ICD-10-CM

## 2019-04-18 DIAGNOSIS — M05.79 RHEUMATOID ARTHRITIS INVOLVING MULTIPLE SITES WITH POSITIVE RHEUMATOID FACTOR (HCC): ICD-10-CM

## 2019-04-18 DIAGNOSIS — G25.81 RLS (RESTLESS LEGS SYNDROME): ICD-10-CM

## 2019-04-18 PROCEDURE — 99215 OFFICE O/P EST HI 40 MIN: CPT | Performed by: NURSE PRACTITIONER

## 2019-04-18 RX ORDER — NAPROXEN 500 MG/1
500 TABLET ORAL 2 TIMES DAILY WITH MEALS
Qty: 60 TABLET | Refills: 0 | Status: SHIPPED | OUTPATIENT
Start: 2019-04-18 | End: 2019-05-16 | Stop reason: SDUPTHER

## 2019-04-18 RX ORDER — GABAPENTIN 300 MG/1
600 CAPSULE ORAL 3 TIMES DAILY
Qty: 180 CAPSULE | Refills: 3 | Status: SHIPPED | OUTPATIENT
Start: 2019-04-18 | End: 2019-05-29

## 2019-04-18 RX ORDER — CLONAZEPAM 0.5 MG/1
0.5 TABLET ORAL DAILY
Qty: 30 TABLET | Refills: 0 | Status: SHIPPED | OUTPATIENT
Start: 2019-04-18 | End: 2019-06-11 | Stop reason: ALTCHOICE

## 2019-04-18 RX ORDER — GABAPENTIN 300 MG/1
600 CAPSULE ORAL 3 TIMES DAILY
Qty: 90 CAPSULE | Refills: 3 | Status: SHIPPED | OUTPATIENT
Start: 2019-04-18 | End: 2019-04-18 | Stop reason: SDUPTHER

## 2019-04-18 RX ORDER — LEVOTHYROXINE SODIUM 0.03 MG/1
25 TABLET ORAL DAILY
Qty: 90 TABLET | Refills: 1 | Status: SHIPPED | OUTPATIENT
Start: 2019-04-18 | End: 2019-11-04 | Stop reason: SDUPTHER

## 2019-04-18 RX ORDER — GABAPENTIN 300 MG/1
100 CAPSULE ORAL 2 TIMES DAILY
COMMUNITY
End: 2019-04-18

## 2019-04-22 ENCOUNTER — APPOINTMENT (OUTPATIENT)
Dept: LAB | Facility: HOSPITAL | Age: 74
End: 2019-04-22
Payer: MEDICARE

## 2019-04-22 DIAGNOSIS — E03.9 HYPOTHYROIDISM, UNSPECIFIED TYPE: ICD-10-CM

## 2019-04-22 LAB
ALBUMIN SERPL BCP-MCNC: 3.7 G/DL (ref 3.5–5)
ALP SERPL-CCNC: 75 U/L (ref 46–116)
ALT SERPL W P-5'-P-CCNC: 25 U/L (ref 12–78)
ANION GAP SERPL CALCULATED.3IONS-SCNC: 3 MMOL/L (ref 4–13)
AST SERPL W P-5'-P-CCNC: 19 U/L (ref 5–45)
BILIRUB SERPL-MCNC: 0.43 MG/DL (ref 0.2–1)
BUN SERPL-MCNC: 13 MG/DL (ref 5–25)
CALCIUM SERPL-MCNC: 8.1 MG/DL (ref 8.3–10.1)
CHLORIDE SERPL-SCNC: 101 MMOL/L (ref 100–108)
CO2 SERPL-SCNC: 30 MMOL/L (ref 21–32)
CREAT SERPL-MCNC: 0.58 MG/DL (ref 0.6–1.3)
GFR SERPL CREATININE-BSD FRML MDRD: 91 ML/MIN/1.73SQ M
GLUCOSE P FAST SERPL-MCNC: 69 MG/DL (ref 65–99)
POTASSIUM SERPL-SCNC: 3.8 MMOL/L (ref 3.5–5.3)
PROT SERPL-MCNC: 6.7 G/DL (ref 6.4–8.2)
SODIUM SERPL-SCNC: 134 MMOL/L (ref 136–145)
TSH SERPL DL<=0.05 MIU/L-ACNC: 2.6 UIU/ML (ref 0.36–3.74)

## 2019-04-22 PROCEDURE — 80053 COMPREHEN METABOLIC PANEL: CPT

## 2019-04-22 PROCEDURE — 84443 ASSAY THYROID STIM HORMONE: CPT

## 2019-04-22 PROCEDURE — 36415 COLL VENOUS BLD VENIPUNCTURE: CPT

## 2019-04-22 RX ORDER — GABAPENTIN 100 MG/1
CAPSULE ORAL
Qty: 56 CAPSULE | Refills: 0 | OUTPATIENT
Start: 2019-04-22

## 2019-04-23 ENCOUNTER — TELEPHONE (OUTPATIENT)
Dept: FAMILY MEDICINE CLINIC | Facility: CLINIC | Age: 74
End: 2019-04-23

## 2019-05-16 DIAGNOSIS — G89.4 CHRONIC PAIN SYNDROME: ICD-10-CM

## 2019-05-16 RX ORDER — NAPROXEN 500 MG/1
TABLET ORAL
Qty: 60 TABLET | Refills: 0 | Status: SHIPPED | OUTPATIENT
Start: 2019-05-16 | End: 2019-06-11 | Stop reason: SDUPTHER

## 2019-05-29 ENCOUNTER — OFFICE VISIT (OUTPATIENT)
Dept: FAMILY MEDICINE CLINIC | Facility: CLINIC | Age: 74
End: 2019-05-29
Payer: MEDICARE

## 2019-05-29 VITALS
HEIGHT: 61 IN | TEMPERATURE: 98.1 F | SYSTOLIC BLOOD PRESSURE: 130 MMHG | WEIGHT: 101 LBS | HEART RATE: 84 BPM | DIASTOLIC BLOOD PRESSURE: 78 MMHG | RESPIRATION RATE: 17 BRPM | BODY MASS INDEX: 19.07 KG/M2 | OXYGEN SATURATION: 94 %

## 2019-05-29 DIAGNOSIS — F41.9 ANXIETY: ICD-10-CM

## 2019-05-29 DIAGNOSIS — G89.4 CHRONIC PAIN SYNDROME: Primary | ICD-10-CM

## 2019-05-29 DIAGNOSIS — G25.81 RLS (RESTLESS LEGS SYNDROME): ICD-10-CM

## 2019-05-29 DIAGNOSIS — F41.9 ANXIETY: Chronic | ICD-10-CM

## 2019-05-29 PROBLEM — G47.09 OTHER INSOMNIA: Status: ACTIVE | Noted: 2019-05-29

## 2019-05-29 PROCEDURE — 99214 OFFICE O/P EST MOD 30 MIN: CPT | Performed by: NURSE PRACTITIONER

## 2019-05-29 PROCEDURE — 1124F ACP DISCUSS-NO DSCNMKR DOCD: CPT | Performed by: NURSE PRACTITIONER

## 2019-05-29 RX ORDER — NORTRIPTYLINE HYDROCHLORIDE 10 MG/1
10 CAPSULE ORAL
Qty: 30 CAPSULE | Refills: 5 | Status: SHIPPED | OUTPATIENT
Start: 2019-05-29 | End: 2019-07-24

## 2019-05-29 RX ORDER — GABAPENTIN 300 MG/1
300 CAPSULE ORAL 3 TIMES DAILY
Qty: 180 CAPSULE | Refills: 3
Start: 2019-05-29 | End: 2019-11-12 | Stop reason: SDUPTHER

## 2019-06-04 ENCOUNTER — APPOINTMENT (OUTPATIENT)
Dept: LAB | Facility: HOSPITAL | Age: 74
End: 2019-06-04
Attending: INTERNAL MEDICINE
Payer: MEDICARE

## 2019-06-04 ENCOUNTER — TRANSCRIBE ORDERS (OUTPATIENT)
Dept: LAB | Facility: HOSPITAL | Age: 74
End: 2019-06-04

## 2019-06-04 DIAGNOSIS — Z79.899 ENCOUNTER FOR LONG-TERM (CURRENT) USE OF OTHER MEDICATIONS: ICD-10-CM

## 2019-06-04 DIAGNOSIS — M81.0 SENILE OSTEOPOROSIS: Primary | ICD-10-CM

## 2019-06-04 LAB
CALCIUM SERPL-MCNC: 8.4 MG/DL (ref 8.3–10.1)
CREAT SERPL-MCNC: 0.57 MG/DL (ref 0.6–1.3)
GFR SERPL CREATININE-BSD FRML MDRD: 92 ML/MIN/1.73SQ M

## 2019-06-04 PROCEDURE — 82565 ASSAY OF CREATININE: CPT

## 2019-06-04 PROCEDURE — 82310 ASSAY OF CALCIUM: CPT

## 2019-06-04 PROCEDURE — 36415 COLL VENOUS BLD VENIPUNCTURE: CPT

## 2019-06-07 ENCOUNTER — TELEPHONE (OUTPATIENT)
Dept: FAMILY MEDICINE CLINIC | Facility: CLINIC | Age: 74
End: 2019-06-07

## 2019-06-07 DIAGNOSIS — G25.81 RLS (RESTLESS LEGS SYNDROME): ICD-10-CM

## 2019-06-07 DIAGNOSIS — G47.09 OTHER INSOMNIA: ICD-10-CM

## 2019-06-07 DIAGNOSIS — F41.9 ANXIETY: Primary | Chronic | ICD-10-CM

## 2019-06-10 DIAGNOSIS — G47.09 OTHER INSOMNIA: ICD-10-CM

## 2019-06-10 DIAGNOSIS — G25.81 RLS (RESTLESS LEGS SYNDROME): ICD-10-CM

## 2019-06-10 DIAGNOSIS — F41.9 ANXIETY: Primary | Chronic | ICD-10-CM

## 2019-06-11 DIAGNOSIS — G89.4 CHRONIC PAIN SYNDROME: ICD-10-CM

## 2019-06-11 DIAGNOSIS — F41.9 ANXIETY: Chronic | ICD-10-CM

## 2019-06-11 RX ORDER — CLONAZEPAM 0.5 MG/1
TABLET ORAL
Qty: 30 TABLET | Refills: 0 | OUTPATIENT
Start: 2019-06-11

## 2019-06-11 RX ORDER — LORAZEPAM 0.5 MG/1
0.5 TABLET ORAL
Qty: 30 TABLET | Refills: 0 | Status: SHIPPED | OUTPATIENT
Start: 2019-06-11 | End: 2019-06-20

## 2019-06-12 RX ORDER — NAPROXEN 500 MG/1
TABLET ORAL
Qty: 60 TABLET | Refills: 0 | Status: SHIPPED | OUTPATIENT
Start: 2019-06-12 | End: 2019-07-25 | Stop reason: SDUPTHER

## 2019-06-17 ENCOUNTER — TELEPHONE (OUTPATIENT)
Dept: FAMILY MEDICINE CLINIC | Facility: CLINIC | Age: 74
End: 2019-06-17

## 2019-06-19 ENCOUNTER — HOSPITAL ENCOUNTER (OUTPATIENT)
Dept: INFUSION CENTER | Facility: HOSPITAL | Age: 74
Discharge: HOME | End: 2019-06-19
Payer: MEDICARE

## 2019-06-19 VITALS
HEART RATE: 82 BPM | DIASTOLIC BLOOD PRESSURE: 75 MMHG | TEMPERATURE: 98.7 F | SYSTOLIC BLOOD PRESSURE: 132 MMHG | RESPIRATION RATE: 18 BRPM

## 2019-06-19 PROCEDURE — 96401 CHEMO ANTI-NEOPL SQ/IM: CPT

## 2019-06-19 RX ADMIN — DENOSUMAB 60 MG: 60 INJECTION SUBCUTANEOUS at 11:40

## 2019-06-19 NOTE — PLAN OF CARE
Problem: Potential for Falls  Goal: Patient will remain free of falls  Description  INTERVENTIONS:  - Assess patient frequently for physical needs  -  Identify cognitive and physical deficits and behaviors that affect risk of falls    -  [place] fall precautions as indicated by assessment   - Educate patient/family on patient safety including physical limitations  - Instruct patient to call for assistance with activity based on assessment  - Modify environment to reduce risk of injury  - Consider OT/PT consult to assist with strengthening/mobility  Outcome: Progressing

## 2019-06-20 ENCOUNTER — TELEPHONE (OUTPATIENT)
Dept: FAMILY MEDICINE CLINIC | Facility: CLINIC | Age: 74
End: 2019-06-20

## 2019-06-20 DIAGNOSIS — G47.09 OTHER INSOMNIA: ICD-10-CM

## 2019-06-20 DIAGNOSIS — G25.81 RLS (RESTLESS LEGS SYNDROME): ICD-10-CM

## 2019-06-20 DIAGNOSIS — F41.9 ANXIETY: Primary | Chronic | ICD-10-CM

## 2019-06-20 RX ORDER — CLONAZEPAM 0.5 MG/1
TABLET ORAL
Qty: 30 TABLET | Refills: 0 | OUTPATIENT
Start: 2019-06-20 | End: 2019-07-24 | Stop reason: ALTCHOICE

## 2019-06-25 ENCOUNTER — TELEPHONE (OUTPATIENT)
Dept: FAMILY MEDICINE CLINIC | Facility: CLINIC | Age: 74
End: 2019-06-25

## 2019-07-05 ENCOUNTER — HOSPITAL ENCOUNTER (EMERGENCY)
Facility: HOSPITAL | Age: 74
Discharge: HOME | End: 2019-07-05
Attending: EMERGENCY MEDICINE | Admitting: EMERGENCY MEDICINE
Payer: MEDICARE

## 2019-07-05 VITALS
BODY MASS INDEX: 19.06 KG/M2 | OXYGEN SATURATION: 99 % | DIASTOLIC BLOOD PRESSURE: 79 MMHG | RESPIRATION RATE: 18 BRPM | TEMPERATURE: 98.2 F | HEART RATE: 72 BPM | HEIGHT: 61 IN | WEIGHT: 100.97 LBS | SYSTOLIC BLOOD PRESSURE: 170 MMHG

## 2019-07-05 DIAGNOSIS — N39.0 UTI (URINARY TRACT INFECTION): ICD-10-CM

## 2019-07-05 DIAGNOSIS — R60.0 PERIORBITAL EDEMA: Primary | ICD-10-CM

## 2019-07-05 LAB
ALBUMIN SERPL BCP-MCNC: 3.1 G/DL (ref 3.5–5)
ALP SERPL-CCNC: 83 U/L (ref 46–116)
ALT SERPL W P-5'-P-CCNC: 20 U/L (ref 12–78)
ANION GAP SERPL CALCULATED.3IONS-SCNC: 6 MMOL/L (ref 4–13)
AST SERPL W P-5'-P-CCNC: 16 U/L (ref 5–45)
BACTERIA UR QL AUTO: ABNORMAL /HPF
BASOPHILS # BLD AUTO: 0 THOUSANDS/ΜL (ref 0–0.1)
BASOPHILS NFR BLD AUTO: 0 % (ref 0–1)
BILIRUB SERPL-MCNC: 0.29 MG/DL (ref 0.2–1)
BILIRUB UR QL STRIP: NEGATIVE
BUN SERPL-MCNC: 11 MG/DL (ref 5–25)
CALCIUM SERPL-MCNC: 8.5 MG/DL (ref 8.3–10.1)
CHLORIDE SERPL-SCNC: 105 MMOL/L (ref 100–108)
CLARITY UR: CLEAR
CO2 SERPL-SCNC: 28 MMOL/L (ref 21–32)
COLOR UR: YELLOW
COLOR, POC: YELLOW
CREAT SERPL-MCNC: 0.6 MG/DL (ref 0.6–1.3)
EOSINOPHIL # BLD AUTO: 0.04 THOUSAND/ΜL (ref 0–0.61)
EOSINOPHIL NFR BLD AUTO: 1 % (ref 0–6)
ERYTHROCYTE [DISTWIDTH] IN BLOOD BY AUTOMATED COUNT: 13.9 % (ref 11.6–15.1)
GFR SERPL CREATININE-BSD FRML MDRD: 89 ML/MIN/1.73SQ M
GLUCOSE SERPL-MCNC: 88 MG/DL (ref 65–140)
GLUCOSE UR STRIP-MCNC: NEGATIVE MG/DL
HCT VFR BLD AUTO: 32.9 % (ref 34.8–46.1)
HGB BLD-MCNC: 10.4 G/DL (ref 11.5–15.4)
HGB UR QL STRIP.AUTO: ABNORMAL
IMM GRANULOCYTES # BLD AUTO: 0.03 THOUSAND/UL (ref 0–0.2)
IMM GRANULOCYTES NFR BLD AUTO: 1 % (ref 0–2)
KETONES UR STRIP-MCNC: NEGATIVE MG/DL
LEUKOCYTE ESTERASE UR QL STRIP: ABNORMAL
LYMPHOCYTES # BLD AUTO: 1.33 THOUSANDS/ΜL (ref 0.6–4.47)
LYMPHOCYTES NFR BLD AUTO: 21 % (ref 14–44)
MCH RBC QN AUTO: 30.1 PG (ref 26.8–34.3)
MCHC RBC AUTO-ENTMCNC: 31.6 G/DL (ref 31.4–37.4)
MCV RBC AUTO: 95 FL (ref 82–98)
MONOCYTES # BLD AUTO: 0.79 THOUSAND/ΜL (ref 0.17–1.22)
MONOCYTES NFR BLD AUTO: 12 % (ref 4–12)
NEUTROPHILS # BLD AUTO: 4.17 THOUSANDS/ΜL (ref 1.85–7.62)
NEUTS SEG NFR BLD AUTO: 65 % (ref 43–75)
NITRITE UR QL STRIP: POSITIVE
NON-SQ EPI CELLS URNS QL MICRO: ABNORMAL /HPF
NRBC BLD AUTO-RTO: 0 /100 WBCS
PH UR STRIP.AUTO: 8 [PH] (ref 4.5–8)
PLATELET # BLD AUTO: 315 THOUSANDS/UL (ref 149–390)
PMV BLD AUTO: 9 FL (ref 8.9–12.7)
POTASSIUM SERPL-SCNC: 3.6 MMOL/L (ref 3.5–5.3)
PROT SERPL-MCNC: 6.5 G/DL (ref 6.4–8.2)
PROT UR STRIP-MCNC: NEGATIVE MG/DL
RBC # BLD AUTO: 3.45 MILLION/UL (ref 3.81–5.12)
RBC #/AREA URNS AUTO: ABNORMAL /HPF
SODIUM SERPL-SCNC: 139 MMOL/L (ref 136–145)
SP GR UR STRIP.AUTO: 1.01 (ref 1–1.03)
TSH SERPL DL<=0.05 MIU/L-ACNC: 1.72 UIU/ML (ref 0.36–3.74)
UROBILINOGEN UR QL STRIP.AUTO: 0.2 E.U./DL
WBC # BLD AUTO: 6.36 THOUSAND/UL (ref 4.31–10.16)
WBC #/AREA URNS AUTO: ABNORMAL /HPF

## 2019-07-05 PROCEDURE — 99284 EMERGENCY DEPT VISIT MOD MDM: CPT | Performed by: EMERGENCY MEDICINE

## 2019-07-05 PROCEDURE — 84443 ASSAY THYROID STIM HORMONE: CPT | Performed by: EMERGENCY MEDICINE

## 2019-07-05 PROCEDURE — 36415 COLL VENOUS BLD VENIPUNCTURE: CPT | Performed by: EMERGENCY MEDICINE

## 2019-07-05 PROCEDURE — 80053 COMPREHEN METABOLIC PANEL: CPT | Performed by: EMERGENCY MEDICINE

## 2019-07-05 PROCEDURE — 85025 COMPLETE CBC W/AUTO DIFF WBC: CPT | Performed by: EMERGENCY MEDICINE

## 2019-07-05 PROCEDURE — 99283 EMERGENCY DEPT VISIT LOW MDM: CPT

## 2019-07-05 PROCEDURE — 81001 URINALYSIS AUTO W/SCOPE: CPT

## 2019-07-05 RX ORDER — CEPHALEXIN 500 MG/1
500 CAPSULE ORAL 2 TIMES DAILY
Qty: 14 CAPSULE | Refills: 0 | Status: SHIPPED | OUTPATIENT
Start: 2019-07-05 | End: 2019-07-12

## 2019-07-05 NOTE — ED ATTENDING ATTESTATION
Katie Villagomez MD, saw and evaluated the patient  I have discussed the patient with the resident/non-physician practitioner and agree with the resident's/non-physician practitioner's findings, Plan of Care, and MDM as documented in the resident's/non-physician practitioner's note, except where noted  All available labs and Radiology studies were reviewed  At this point I agree with the current assessment done in the Emergency Department  I have conducted an independent evaluation of this patient including a focused history of:    Emergency Department NotePradonay Granados Colon 75 y o  female MRN: [id number]    Unit/Bed#: ED 15 Encounter: [id number]    Alena Bruno is a 75 y o  female who presents with   Chief Complaint   Patient presents with    Facial Swelling     Patient reports facial swelling that began three days ago  Patient reports intermittent pain around the eyes  Patient took benadryl and prednisone which did not help  Patient denies trauma, fall  History of Present Illness   HPI:  Alena Bruno is a 75 y o  female who presents for evaluation of:  Facial swelling  Patient has a h/o Rheumatoid arthritis and is treated with methotrexate and prednisone  Patient noted the onset of swelling 3 days ago; the swelling was worse this am  Prompting an ED visit  Patient denies any facial pain  Patient is concerned that the swelling is going to her eyes  Review of Systems   Constitutional: Negative for chills and fever  Eyes: Positive for itching  Negative for pain and redness  Cardiovascular: Negative for chest pain and leg swelling  Neurological: Positive for light-headedness  Negative for headaches  All other systems reviewed and are negative        Historical Information   Past Medical History:   Diagnosis Date    Anxiety     Depression     Disease of thyroid gland     HYPO    Femur neck fracture (HCC)     GERD (gastroesophageal reflux disease)     Hyperthyroidism     RESOLVED: [id number]  Osteoporosis     Polio     PVD (peripheral vascular disease) ([facility] [address] )     Right BBB/left ant fasc block     UTI (urinary tract infection)     Varicella infection     LAST ASSESSED: [id number]     Past Surgical History:   Procedure Laterality Date    APPENDECTOMY      HIP ARTHROPLASTY Left 11/27/2017    Procedure: REMOVAL IM NAIL CONVERSION TO TOTAL HIP ARTHROPLASTY POSTERIOR APPROACH;  Surgeon: Mary Calabrese MD;  Location: [facility];  Service: Orthopedics    HIP FRACTURE SURGERY      HIP SURGERY      both hips replaced;2013 left ,2014 right    JOINT REPLACEMENT Right     HIP TOTAL    TN CONV PREV HIP SURG TO TOT HIP ARTHROPLAS Left 10/4/2017    Procedure: Tedpebbles Doing removal of left hip;  Surgeon: Mary Calabrese MD;  Location: [facility];  Service: Orthopedics    REVISION TOTAL HIP ARTHROPLASTY Right     TONSILLECTOMY       Social History   Social History     Substance and Sexual Activity   Alcohol Use No     Social History     Substance and Sexual Activity   Drug Use No     Social History     Tobacco Use   Smoking Status Former Smoker   Smokeless Tobacco Never Used   Tobacco Comment    quit 20-30 years ago; NEVER A SMOKER AS PER ALL SCRIPTS      Family History: non-contributory    Meds/Allergies   all medications and allergies reviewed  No Known Allergies    Objective   First Vitals:   Blood Pressure: 159/79 (07/05/19 0610)  Pulse: 74 (07/05/19 0610)  Temperature: 98 2 °F (36 8 °C) (07/05/19 0610)  Temp Source: Oral (07/05/19 0610)  Respirations: 18 (07/05/19 0610)  Height: 5' 1" (154 9 cm) (07/05/19 0610)  Weight - Scale: 45 8 kg (100 lb 15 5 oz) (07/05/19 0610)  SpO2: 99 % (07/05/19 0610)    Current Vitals:   Blood Pressure: 159/79 (07/05/19 0610)  Pulse: 74 (07/05/19 0610)  Temperature: 98 2 °F (36 8 °C) (07/05/19 0610)  Temp Source: Oral (07/05/19 0610)  Respirations: 18 (07/05/19 0610)  Height: 5' 1" (154 9 cm) (07/05/19 0610)  Weight - Scale: 45 8 kg (100 lb 15 5 oz) (07/05/19 0610)  SpO2: 99 % (19 0610)    No intake or output data in the 24 hours ending 19 0624    Invasive Devices     None                 Physical Exam   Constitutional: She is oriented to person, place, and time  She appears well-developed and well-nourished  HENT:   Head: Atraumatic  Head is without right periorbital erythema and without left periorbital erythema  Eyes: Pupils are equal, round, and reactive to light  EOM are normal  Right eye exhibits no discharge  Left eye exhibits no discharge  No hordeolum   Cardiovascular: Normal rate and regular rhythm  Pulmonary/Chest: Effort normal and breath sounds normal    Abdominal: Soft  Bowel sounds are normal    Musculoskeletal: Normal range of motion  She exhibits edema (trace edema right foot)  She exhibits no deformity  Neurological: She is alert and oriented to person, place, and time  Skin: Skin is warm and dry  Capillary refill takes less than 2 seconds  Psychiatric: She has a normal mood and affect  Her behavior is normal  Judgment and thought content normal    Nursing note and vitals reviewed  Medical Decision Makin  Bilateral facial edema primarily affecting lower lids: CMP r/o renal dysfunction; hypoalbuminemia; TSH r/o hypothyroidism    No results found for this or any previous visit (from the past 39 hour(s))  No orders to display         Portions of the record may have been created with voice recognition software  Occasional wrong word or "sound a like" substitutions may have occurred due to the inherent limitations of voice recognition software  Read the chart carefully and recognize, using context, where substitutions have occurred

## 2019-07-05 NOTE — DISCHARGE INSTRUCTIONS
It is likely your eye swelling is a side effect from your methotrexate  Continue taking this medication for now, unless your symptoms worsen   Discuss with your rheumatologist regarding further management of this medication

## 2019-07-05 NOTE — ED PROVIDER NOTES
ASSESSMENT AND PLAN    Veronica Lee is a 75 y o  female with a history of RA on chronic prednisone, recently started on methotrexate, who presents for evaluation of periorbital edema  On arrival, the patient is hemodynamically stable and well-appearing without acute distress, with a nontoxic appearance  Her exam is noted below     -Labwork unremarkable  Creatinine normal  UA not consistent with nephroptic syndrome  TSH was normal  -possibly ADR from methotrexate  Not consistent with cellulitis and without visual symptoms  Will discharge home  Strict return precauations provided  Instructed to follow up with her rheumatologist for medication management      History  Chief Complaint   Patient presents with    Facial Swelling     Patient reports facial swelling that began three days ago  Patient reports intermittent pain around the eyes  Patient took benadryl and prednisone which did not help  Patient denies trauma, fall  This is a 43-year-old female with history of rheumatoid arthritis, on chronic prednisone, methotrexate, who presents for evaluation of bilateral facial swelling, periorbital edema  She also has a history of hypothyroidism, depression  The patient states her symptoms started 3 days ago, and have not improved, so she came to the emergency department for further evaluation  She has never had symptoms like this before  She states that the swelling is red, and slightly painful  She denies any other symptoms  She denies any visual changes, or difficulty seeing  She denies any fevers, chills, lightheadedness, dizziness, chest pain, shortness breath, nausea, vomiting, or diarrhea  She has no recent change in weight  She has no recent change in her urinary pattern, and denies any urinary symptoms  Prior to Admission Medications   Prescriptions Last Dose Informant Patient Reported? Taking?    Ascorbic Acid (VITAMIN C) 1000 MG tablet 7/4/2019 at Unknown time Self Yes Yes   Sig: Take 1,000 mg by mouth daily   Multiple Vitamins-Minerals (CENTRUM SILVER PO) 2019 at Unknown time Self Yes Yes   Sig: Take 1 tablet by mouth daily   Psyllium (METAMUCIL FIBER PO) 2019 at Unknown time Self Yes Yes   Sig: Take 1 packet by mouth 3 (three) times a day     Vitamin E 400 units TABS 2019 at Unknown time Self Yes Yes   Sig: Take 400 Units by mouth daily     acetaminophen (TYLENOL) 325 mg tablet  Self No Yes   Sig: Take prn for mild pain   Patient taking differently: Take 650 mg by mouth daily Take prn for mild pain    cholecalciferol (VITAMIN D3) 1,000 units tablet 2019 at Unknown time Self Yes Yes   Sig: Take 1,000 Units by mouth daily   clonazePAM (KlonoPIN) 0 5 mg tablet Not Taking at Unknown time  No No   Si/2 to full tab daily at dinner   Patient not taking: Reported on 2019   docusate sodium (COLACE) 100 mg capsule 2019 at Unknown time Self No Yes   Sig: Take 1 capsule by mouth 2 (two) times a day   folic acid (FOLVITE) 1 mg tablet  Self No No   Sig: TAKE ONE TABLET BY MOUTH EVERY DAY   Patient not taking: Reported on 2019   gabapentin (NEURONTIN) 300 mg capsule 2019 at Unknown time  No Yes   Sig: Take 1 capsule (300 mg total) by mouth 3 (three) times a day   levothyroxine 25 mcg tablet 2019 at Unknown time Self No Yes   Sig: Take 1 tablet (25 mcg total) by mouth daily   lidocaine (LIDODERM) 5 %  Self No No   Sig: Apply 1 patch topically daily Remove & Discard patch within 12 hours or as directed by MD   Patient not taking: Reported on 2019   methotrexate 2 5 MG tablet 2019 at Unknown time Self Yes Yes   Sig: Take by mouth 4 tablets by mouth once a week on monday   naproxen (NAPROSYN) 500 mg tablet   No Yes   Sig: TAKE 1 TABLET BY MOUTH 2 (TWO) TIMES A DAY WITH MEALS   nortriptyline (PAMELOR) 10 mg capsule 2019 at Unknown time  No Yes   Sig: Take 1 capsule (10 mg total) by mouth daily at bedtime   nortriptyline (PAMELOR) 25 mg capsule 2019 at Unknown time Self No Yes   Sig: TAKE ONE CAPSULE BY MOUTH AT BEDTIME   predniSONE 10 mg tablet 7/5/2019 at Unknown time Self Yes Yes   Sig: Take 5 mg by mouth daily Dr Daniela Minaya      Facility-Administered Medications: None       Past Medical History:   Diagnosis Date    Anxiety     Depression     Disease of thyroid gland     HYPO    Femur neck fracture (HCC)     GERD (gastroesophageal reflux disease)     Hyperthyroidism     RESOLVED: [id number]    Osteoporosis     Polio     PVD (peripheral vascular disease) ([address] )     Right BBB/left ant fasc block     UTI (urinary tract infection)     Varicella infection     LAST ASSESSED: [id number]       Past Surgical History:   Procedure Laterality Date    APPENDECTOMY      HIP ARTHROPLASTY Left 11/27/2017    Procedure: REMOVAL IM NAIL CONVERSION TO TOTAL HIP ARTHROPLASTY POSTERIOR APPROACH;  Surgeon: Joey Sun MD;  Location: [facility];  Service: Orthopedics    HIP FRACTURE SURGERY      HIP SURGERY      both hips replaced;2013 left ,2014 right    JOINT REPLACEMENT Right     HIP TOTAL    [place] CONV PREV HIP SURG TO TOT HIP Valentine Gordillo Left 10/4/2017    Procedure: Natasha Economy removal of left hip;  Surgeon: Joey Sun MD;  Location: [facility];  Service: Orthopedics    REVISION TOTAL HIP ARTHROPLASTY Right     TONSILLECTOMY         Family History   Problem Relation Age of Onset    Rheum arthritis Mother     Rheum arthritis Sister     Prostate cancer Brother      I have reviewed and agree with the history as documented  Social History     Tobacco Use    Smoking status: Former Smoker    Smokeless tobacco: Never Used    Tobacco comment: quit 20-30 years ago; NEVER A SMOKER AS PER ALL SCRIPTS    Substance Use Topics    Alcohol use: No    Drug use: No        Review of Systems   Constitutional: Negative for chills and fever  HENT: Positive for facial swelling  Negative for congestion, dental problem, ear pain, rhinorrhea, sinus pain and voice change      Eyes: Negative for photophobia and visual disturbance  Respiratory: Negative for cough and shortness of breath  Cardiovascular: Negative for chest pain and palpitations  Gastrointestinal: Negative for abdominal pain, diarrhea, nausea and vomiting  Genitourinary: Negative for dysuria and frequency  Musculoskeletal: Negative for neck pain and neck stiffness  Skin: Negative for pallor and rash  Neurological: Negative for light-headedness and headaches  All other systems reviewed and are negative  Physical Exam  ED Triage Vitals [07/05/19 0610]   Temperature Pulse Respirations Blood Pressure SpO2   98 2 °F (36 8 °C) 74 18 159/79 99 %      Temp Source Heart Rate Source Patient Position - Orthostatic VS BP Location FiO2 (%)   Oral Monitor Lying Right arm --      Pain Score       No Pain             Orthostatic Vital Signs  Vitals:    07/05/19 0610 07/05/19 0700 07/05/19 0730   BP: 159/79 144/72 170/79   Pulse: 74 72 72   Patient Position - Orthostatic VS: Lying Sitting Sitting       Physical Exam   Constitutional: She is oriented to person, place, and time  Awake and alert, well appearing, no acute distress  Nontoxic in appearance  HENT:   Head: Normocephalic and atraumatic  Mouth/Throat: Oropharynx is clear and moist  No oropharyngeal exudate  Mild bilateral periorbital edema, with associated erythema  There is no induration, or fluctuance  There is no involvement of the eyelids bilaterally  Eyes: Pupils are equal, round, and reactive to light  EOM are normal  Right eye exhibits no discharge  Left eye exhibits no discharge  No scleral icterus  Neck: Normal range of motion  Neck supple  No JVD present  No tracheal deviation present  Cardiovascular: Normal rate, regular rhythm and normal heart sounds  No murmur heard  Pulmonary/Chest: Effort normal  No stridor  No respiratory distress  She has no wheezes  She has no rales  Abdominal: Soft  She exhibits no distension and no mass   There is no tenderness  Musculoskeletal: Normal range of motion  She exhibits no edema or deformity  Neurological: She is alert and oriented to person, place, and time  No cranial nerve deficit  She exhibits normal muscle tone  Skin: Skin is warm and dry  No rash noted  No pallor  ED Medications  Medications - No data to display    Diagnostic Studies  Results Reviewed     Procedure Component Value Units Date/Time    Urine Microscopic [[id number]]  (Abnormal) Collected:  07/05/19 0725    Lab Status:  Final result Specimen:  Urine, Clean Catch Updated:  07/05/19 0824     RBC, UA None Seen /hpf      WBC, UA 2-4 /hpf      Epithelial Cells None Seen /hpf      Bacteria, UA Moderate /hpf     POCT urinalysis dipstick [[id number]]  (Normal) Resulted:  07/05/19 0722    Lab Status:  Final result Specimen:  Urine Updated:  07/05/19 0722     Color, UA yellow    ED Urine Macroscopic [[id number]]  (Abnormal) Collected:  07/05/19 0725    Lab Status:  Final result Specimen:  Urine Updated:  07/05/19 0721     Color, UA Yellow     Clarity, UA Clear     pH, UA 8 0     Leukocytes, UA Moderate     Nitrite, UA Positive     Protein, UA Negative mg/dl      Glucose, UA Negative mg/dl      Ketones, UA Negative mg/dl      Urobilinogen, UA 0 2 E U /dl      Bilirubin, UA Negative     Blood, UA Trace     Specific [place], UA 1 015    Narrative:       CLINITEK RESULT    TSH, 3rd generation with Free T4 reflex [[id number]]  (Normal) Collected:  07/05/19 0630    Lab Status:  Final result Specimen:  Blood from Arm, Left Updated:  07/05/19 0718     TSH 3RD GENERATON 1 720 uIU/mL     Narrative:       Patients undergoing fluorescein dye angiography may retain small amounts of fluorescein in the body for 48-72 hours post procedure  Samples containing fluorescein can produce falsely depressed TSH values  If the patient had this procedure,a specimen should be resubmitted post fluorescein clearance        Comprehensive metabolic panel [[id number]] (Abnormal) Collected:  07/05/19 0630    Lab Status:  Final result Specimen:  Blood from Arm, Left Updated:  07/05/19 6021     Sodium 139 mmol/L      Potassium 3 6 mmol/L      Chloride 105 mmol/L      CO2 28 mmol/L      ANION GAP 6 mmol/L      BUN 11 mg/dL      Creatinine 0 60 mg/dL      Glucose 88 mg/dL      Calcium 8 5 mg/dL      AST 16 U/L      ALT 20 U/L      Alkaline Phosphatase 83 U/L      Total Protein 6 5 g/dL      Albumin 3 1 g/dL      Total Bilirubin 0 29 mg/dL      eGFR 89 ml/min/1 73sq m     Narrative:       National Kidney Disease Foundation guidelines for Chronic Kidney Disease (CKD):     Stage 1 with normal or high GFR (GFR > 90 mL/min/1 73 square meters)    Stage 2 Mild CKD (GFR = 60-89 mL/min/1 73 square meters)    Stage 3A Moderate CKD (GFR = 45-59 mL/min/1 73 square meters)    Stage 3B Moderate CKD (GFR = 30-44 mL/min/1 73 square meters)    Stage 4 Severe CKD (GFR = 15-29 mL/min/1 73 square meters)    Stage 5 End Stage CKD (GFR <15 mL/min/1 73 square meters)  Note: GFR calculation is accurate only with a steady state creatinine    CBC and differential [[id number]]  (Abnormal) Collected:  07/05/19 0630    Lab Status:  Final result Specimen:  Blood from Arm, Left Updated:  07/05/19 0653     WBC 6 36 Thousand/uL      RBC 3 45 Million/uL      Hemoglobin 10 4 g/dL      Hematocrit 32 9 %      MCV 95 fL      MCH 30 1 pg      MCHC 31 6 g/dL      RDW 13 9 %      MPV 9 0 fL      Platelets 119 Thousands/uL      nRBC 0 /100 WBCs      Neutrophils Relative 65 %      Immat GRANS % 1 %      Lymphocytes Relative 21 %      Monocytes Relative 12 %      Eosinophils Relative 1 %      Basophils Relative 0 %      Neutrophils Absolute 4 17 Thousands/µL      Immature Grans Absolute 0 03 Thousand/uL      Lymphocytes Absolute 1 33 Thousands/µL      Monocytes Absolute 0 79 Thousand/µL      Eosinophils Absolute 0 04 Thousand/µL      Basophils Absolute 0 00 Thousands/µL                  No orders to display Procedures  Procedures        ED Course                               MDM    Disposition  Final diagnoses:   UTI (urinary tract infection)   Periorbital edema     Time reflects when diagnosis was documented in both MDM as applicable and the Disposition within this note     Time User Action Codes Description Comment    7/5/2019  7:24 AM Katherne Edilson Add [N39 0] UTI (urinary tract infection)     7/5/2019  7:24 AM Katherne Edilson Add [R60 0] Periorbital edema     7/5/2019  7:24 AM Katherne Edilson Modify [N39 0] UTI (urinary tract infection)     7/5/2019  7:24 AM Katherne Edilson Modify [R60 0] Periorbital edema       ED Disposition     ED Disposition Condition Date/Time Comment    Discharge Stable Fri Jul 5, 2019  7:23 AM Geetha Rivera discharge to home/self care              Follow-up Information    None         Discharge Medication List as of 7/5/2019  7:26 AM      START taking these medications    Details   cephalexin (KEFLEX) 500 mg capsule Take 1 capsule (500 mg total) by mouth 2 (two) times a day for 7 days, Starting Fri 7/5/2019, Until Fri 7/12/2019, Print         CONTINUE these medications which have NOT CHANGED    Details   acetaminophen (TYLENOL) 325 mg tablet Take prn for mild pain, Print      Ascorbic Acid (VITAMIN C) 1000 MG tablet Take 1,000 mg by mouth daily, Historical Med      cholecalciferol (VITAMIN D3) 1,000 units tablet Take 1,000 Units by mouth daily, Starting Tue 12/13/2016, Historical Med      docusate sodium (COLACE) 100 mg capsule Take 1 capsule by mouth 2 (two) times a day, Starting Mon 11/27/2017, Normal      gabapentin (NEURONTIN) 300 mg capsule Take 1 capsule (300 mg total) by mouth 3 (three) times a day, Starting Wed 5/29/2019, No Print      levothyroxine 25 mcg tablet Take 1 tablet (25 mcg total) by mouth daily, Starting Thu 4/18/2019, Normal      methotrexate 2 5 MG tablet Take by mouth 4 tablets by mouth once a week on monday, Historical Med      Multiple Vitamins-Minerals (CENTRUM SILVER PO) Take 1 tablet by mouth daily, Starting Tue 12/13/2016, Historical Med      naproxen (NAPROSYN) 500 mg tablet TAKE 1 TABLET BY MOUTH 2 (TWO) TIMES A DAY WITH MEALS, Normal      !! nortriptyline (PAMELOR) 10 mg capsule Take 1 capsule (10 mg total) by mouth daily at bedtime, Starting Wed 5/29/2019, Normal      !! nortriptyline (PAMELOR) 25 mg capsule TAKE ONE CAPSULE BY MOUTH AT BEDTIME, Normal      predniSONE 10 mg tablet Take 5 mg by mouth daily Dr Estrella Archer, Historical Med      Psyllium (METAMUCIL FIBER PO) Take 1 packet by mouth 3 (three) times a day  , Historical Med      Vitamin E 400 units TABS Take 400 Units by mouth daily  , Starting Tue 12/13/2016, Historical Med      clonazePAM (KlonoPIN) 0 5 mg tablet 1/2 to full tab daily at dinner, Phone In      folic acid (FOLVITE) 1 mg tablet TAKE ONE TABLET BY MOUTH EVERY DAY, Normal      lidocaine (LIDODERM) 5 % Apply 1 patch topically daily Remove & Discard patch within 12 hours or as directed by MD, Starting Wed 11/21/2018, Print       !! - Potential duplicate medications found  Please discuss with provider  No discharge procedures on file  ED Provider  Attending physically available and evaluated Geetha Rivera I managed the patient along with the ED Attending      Electronically Signed by         Mary Dunn MD  07/07/19 0490

## 2019-07-24 ENCOUNTER — OFFICE VISIT (OUTPATIENT)
Dept: INTERNAL MEDICINE CLINIC | Facility: CLINIC | Age: 74
End: 2019-07-24

## 2019-07-24 VITALS
HEIGHT: 59 IN | TEMPERATURE: 97.8 F | HEART RATE: 72 BPM | DIASTOLIC BLOOD PRESSURE: 68 MMHG | SYSTOLIC BLOOD PRESSURE: 122 MMHG | BODY MASS INDEX: 20.09 KG/M2 | WEIGHT: 99.65 LBS

## 2019-07-24 DIAGNOSIS — M80.00XD AGE-RELATED OSTEOPOROSIS WITH CURRENT PATHOLOGICAL FRACTURE WITH ROUTINE HEALING, SUBSEQUENT ENCOUNTER: ICD-10-CM

## 2019-07-24 DIAGNOSIS — G47.09 OTHER INSOMNIA: ICD-10-CM

## 2019-07-24 DIAGNOSIS — E03.8 HYPOTHYROIDISM DUE TO HASHIMOTO'S THYROIDITIS: Primary | ICD-10-CM

## 2019-07-24 DIAGNOSIS — M05.79 RHEUMATOID ARTHRITIS INVOLVING MULTIPLE SITES WITH POSITIVE RHEUMATOID FACTOR (HCC): ICD-10-CM

## 2019-07-24 DIAGNOSIS — G89.4 CHRONIC PAIN SYNDROME: ICD-10-CM

## 2019-07-24 DIAGNOSIS — E06.3 HYPOTHYROIDISM DUE TO HASHIMOTO'S THYROIDITIS: Primary | ICD-10-CM

## 2019-07-24 DIAGNOSIS — E55.9 VITAMIN D DEFICIENCY: Chronic | ICD-10-CM

## 2019-07-24 PROCEDURE — 99214 OFFICE O/P EST MOD 30 MIN: CPT | Performed by: INTERNAL MEDICINE

## 2019-07-24 RX ORDER — NORTRIPTYLINE HYDROCHLORIDE 50 MG/1
50 CAPSULE ORAL
Qty: 90 CAPSULE | Refills: 0 | Status: SHIPPED | OUTPATIENT
Start: 2019-07-24 | End: 2019-10-18 | Stop reason: SDUPTHER

## 2019-07-24 RX ORDER — PREDNISONE 1 MG/1
1 TABLET ORAL DAILY
Refills: 0 | COMMUNITY
Start: 2019-07-05 | End: 2020-09-01 | Stop reason: ALTCHOICE

## 2019-07-24 RX ORDER — NAPROXEN 500 MG/1
TABLET ORAL
Qty: 60 TABLET | Refills: 0 | OUTPATIENT
Start: 2019-07-24

## 2019-07-24 NOTE — PATIENT INSTRUCTIONS
As we reviewed today I have increased her nortriptyline to 50 mg at bedtime  Please call here in 2-3 weeks to advise us if this medication is helping with her sleep were not  You had labs completed earlier this month and her thyroid function is normal please continue levothyroxine 25 mcg daily  Please continue to follow up with her rheumatologist as scheduled with your current dosing of methotrexate, folic acid and prednisone  You confirm you are scheduled for your Prolia infusion in December  As reviewed I have given you a script to check her vitamin-D levels in October to make sure your level is good prior to that infusion  You are also aware that you will have labs completed in December prior to that infusion as well  Please schedule follow-up with me in 3 months

## 2019-07-24 NOTE — PROGRESS NOTES
Assessment/Plan:    No problem-specific Assessment & Plan notes found for this encounter  Diagnoses and all orders for this visit:    Hypothyroidism due to Hashimoto's thyroiditis    Rheumatoid arthritis involving multiple sites with positive rheumatoid factor ([address] )    Other insomnia  -     nortriptyline (PAMELOR) 50 mg capsule; Take 1 capsule (50 mg total) by mouth daily at bedtime for 90 days    Vitamin D deficiency  -     Vitamin D 25 hydroxy; Future    Age-related osteoporosis with current pathological fracture with routine healing, subsequent encounter    Other orders  -     predniSONE 1 mg tablet          Subjective:      Patient ID: Yulissa Up is a 75 y o  female  Pt here as new patient transfer of care  Her last PCP left the office  RA followed by Dr Graciela Fallon every 3 months    Was in ER 7/5/19 for swelling around eyes was told ? ? MTX but she spoke with Rheum who advised not but was dec to 2 pills per week  Now on prednisone 1mg 3 pills once a day  Patient reports she has had a little bit of mild swelling underneath her eyes since the ER visit however she has noticed this happens when it is high heat and humidity or if she has too much salt  Not taking the clonopin as too dizzy all day  Is taking 35 mg nortriptyline and at first helped at night but now not helping her sleep  Patient reports the seems to be her biggest concern as no matter what she does and she follows all of the sleep hygiene there are times when she just can't fall asleep  Patient denies having to get up to go to the bathroom more because she is having pain  Per patient doctor Graciela Fallon advised her that we might be able to increase this dose for her  Reviewed with patient that we will increase her to 50 mg however she does have a very small body habitus and we do not want to cause her any worsening of her symptoms  Patient to date has tolerated this medication with no side effects will try a higher dose    However if patient does not have any improvement may need to consider changing her medication  On prolia for osteoporosis last infusion 6/19/2019 sched for next at end of year  Last vitamin-D was checked in November 2018 and her level was good at 45 however previously it had been low  Last DEXA scan was in 2017 however patient states her rheumatologist does it in his office  Patient confirms taking her levothyroxine 25 mcg daily  Was last checked July 5th and her level was good  Patient's kidney liver functions as well as sugar also all normal       Patient is also known to have a vertebral compression fracture as noted on imaging and patient was followed by neuro surgery in February of this year  They advised her that the fracture was stable and did not require any surgical intervention  Patient states she sometimes gets stiffness and some achiness in her back but not on a regular basis  Patient reports that today she did not have any additional concerns her only concern today was being able to get medication for her sleep  The following portions of the patient's history were reviewed and updated as appropriate: allergies, current medications, past family history, past medical history, past social history, past surgical history and problem list     Review of Systems   Constitutional: Negative for appetite change, chills, fever and unexpected weight change  Respiratory: Negative  Negative for cough and shortness of breath  Cardiovascular: Negative  Negative for chest pain, palpitations and leg swelling  Gastrointestinal: Positive for constipation (Patient does have history of on off constipation however she reports that the Colace and the Metamucil help her  )  Negative for abdominal pain, nausea and vomiting  Endocrine: Negative for cold intolerance and heat intolerance  Genitourinary: Negative for frequency and urgency     Musculoskeletal: Positive for arthralgias, joint swelling and myalgias  Patient reports overall her rheumatoid arthritis has been well managed on her current dosing of methotrexate and prednisone  Patient does admit sometimes she gets flares  Skin: Negative for rash  Neurological: Positive for dizziness (Patient reports she only had the dizziness when she was taking the benzos  )  Negative for light-headedness and headaches  Psychiatric/Behavioral: Positive for sleep disturbance  Negative for behavioral problems and dysphoric mood  The patient is not nervous/anxious  Objective:      /68 (BP Location: Right arm, Patient Position: Sitting, Cuff Size: Standard)   Pulse 72   Temp 97 8 °F (36 6 °C) (Oral)   Ht 4' 10 5" (1 486 m)   Wt 45 2 kg (99 lb 10 4 oz)   BMI 20 47 kg/m²          Physical Exam   Constitutional: She appears well-developed and well-nourished  No distress  HENT:   Head: Normocephalic and atraumatic  Eyes: Pupils are equal, round, and reactive to light  Neck: Neck supple  No thyromegaly present  Cardiovascular: Normal rate, regular rhythm and normal heart sounds  No murmur heard  Pulmonary/Chest: Effort normal and breath sounds normal  She has no wheezes  She has no rales  Abdominal: Soft  Bowel sounds are normal  There is no tenderness  Musculoskeletal: She exhibits deformity  She exhibits no edema  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

## 2019-07-25 DIAGNOSIS — G89.4 CHRONIC PAIN SYNDROME: ICD-10-CM

## 2019-07-29 RX ORDER — NAPROXEN 500 MG/1
500 TABLET ORAL 2 TIMES DAILY WITH MEALS
Qty: 60 TABLET | Refills: 0 | Status: SHIPPED | OUTPATIENT
Start: 2019-07-29 | End: 2019-08-28 | Stop reason: SDUPTHER

## 2019-08-28 DIAGNOSIS — G89.4 CHRONIC PAIN SYNDROME: ICD-10-CM

## 2019-08-28 RX ORDER — NAPROXEN 500 MG/1
TABLET ORAL
Qty: 60 TABLET | Refills: 0 | Status: SHIPPED | OUTPATIENT
Start: 2019-08-28 | End: 2019-10-04 | Stop reason: SDUPTHER

## 2019-10-04 DIAGNOSIS — G89.4 CHRONIC PAIN SYNDROME: ICD-10-CM

## 2019-10-04 RX ORDER — NAPROXEN 500 MG/1
500 TABLET ORAL 2 TIMES DAILY WITH MEALS
Qty: 60 TABLET | Refills: 0 | Status: SHIPPED | OUTPATIENT
Start: 2019-10-04 | End: 2019-11-07 | Stop reason: SDUPTHER

## 2019-10-08 ENCOUNTER — APPOINTMENT (OUTPATIENT)
Dept: LAB | Facility: HOSPITAL | Age: 74
End: 2019-10-08
Payer: MEDICARE

## 2019-10-08 DIAGNOSIS — E55.9 VITAMIN D DEFICIENCY: Chronic | ICD-10-CM

## 2019-10-08 LAB — 25(OH)D3 SERPL-MCNC: 30.1 NG/ML (ref 30–100)

## 2019-10-08 PROCEDURE — 82306 VITAMIN D 25 HYDROXY: CPT

## 2019-10-08 PROCEDURE — 36415 COLL VENOUS BLD VENIPUNCTURE: CPT

## 2019-10-18 DIAGNOSIS — G47.09 OTHER INSOMNIA: ICD-10-CM

## 2019-10-21 RX ORDER — NORTRIPTYLINE HYDROCHLORIDE 50 MG/1
50 CAPSULE ORAL
Qty: 90 CAPSULE | Refills: 0 | Status: SHIPPED | OUTPATIENT
Start: 2019-10-21 | End: 2020-01-07

## 2019-11-01 DIAGNOSIS — E03.9 HYPOTHYROIDISM, UNSPECIFIED TYPE: ICD-10-CM

## 2019-11-04 DIAGNOSIS — E03.9 HYPOTHYROIDISM, UNSPECIFIED TYPE: ICD-10-CM

## 2019-11-04 RX ORDER — LEVOTHYROXINE SODIUM 0.03 MG/1
25 TABLET ORAL DAILY
Qty: 90 TABLET | Refills: 1 | Status: SHIPPED | OUTPATIENT
Start: 2019-11-04 | End: 2020-04-27 | Stop reason: SDUPTHER

## 2019-11-04 RX ORDER — LEVOTHYROXINE SODIUM 0.03 MG/1
TABLET ORAL
Qty: 90 TABLET | Refills: 1 | OUTPATIENT
Start: 2019-11-04

## 2019-11-07 DIAGNOSIS — G89.4 CHRONIC PAIN SYNDROME: ICD-10-CM

## 2019-11-07 RX ORDER — NAPROXEN 500 MG/1
TABLET ORAL
Qty: 60 TABLET | Refills: 0 | Status: SHIPPED | OUTPATIENT
Start: 2019-11-07 | End: 2019-11-12 | Stop reason: ALTCHOICE

## 2019-11-12 ENCOUNTER — OFFICE VISIT (OUTPATIENT)
Dept: INTERNAL MEDICINE CLINIC | Facility: CLINIC | Age: 74
End: 2019-11-12

## 2019-11-12 VITALS
HEART RATE: 75 BPM | HEIGHT: 59 IN | WEIGHT: 99.65 LBS | BODY MASS INDEX: 20.09 KG/M2 | OXYGEN SATURATION: 96 % | SYSTOLIC BLOOD PRESSURE: 140 MMHG | DIASTOLIC BLOOD PRESSURE: 78 MMHG | TEMPERATURE: 98.1 F

## 2019-11-12 DIAGNOSIS — G89.4 CHRONIC PAIN SYNDROME: ICD-10-CM

## 2019-11-12 DIAGNOSIS — M05.79 RHEUMATOID ARTHRITIS INVOLVING MULTIPLE SITES WITH POSITIVE RHEUMATOID FACTOR (HCC): ICD-10-CM

## 2019-11-12 DIAGNOSIS — R10.13 DYSPEPSIA: ICD-10-CM

## 2019-11-12 DIAGNOSIS — E03.8 HYPOTHYROIDISM DUE TO HASHIMOTO'S THYROIDITIS: Primary | ICD-10-CM

## 2019-11-12 DIAGNOSIS — E06.3 HYPOTHYROIDISM DUE TO HASHIMOTO'S THYROIDITIS: Primary | ICD-10-CM

## 2019-11-12 DIAGNOSIS — G47.09 OTHER INSOMNIA: ICD-10-CM

## 2019-11-12 DIAGNOSIS — G25.81 RLS (RESTLESS LEGS SYNDROME): ICD-10-CM

## 2019-11-12 PROBLEM — M16.12 ARTHRITIS OF LEFT HIP: Status: RESOLVED | Noted: 2017-10-04 | Resolved: 2019-11-12

## 2019-11-12 PROCEDURE — 99213 OFFICE O/P EST LOW 20 MIN: CPT | Performed by: PHYSICIAN ASSISTANT

## 2019-11-12 RX ORDER — FAMOTIDINE 20 MG/1
20 TABLET, FILM COATED ORAL 2 TIMES DAILY PRN
Qty: 60 TABLET | Refills: 1 | Status: SHIPPED | OUTPATIENT
Start: 2019-11-12 | End: 2019-12-07 | Stop reason: HOSPADM

## 2019-11-12 RX ORDER — GABAPENTIN 300 MG/1
300 CAPSULE ORAL 3 TIMES DAILY
Qty: 270 CAPSULE | Refills: 1 | Status: SHIPPED | OUTPATIENT
Start: 2019-11-12 | End: 2020-06-03

## 2019-11-12 RX ORDER — MIRTAZAPINE 15 MG/1
15 TABLET, FILM COATED ORAL
Qty: 90 TABLET | Refills: 0 | Status: CANCELLED | OUTPATIENT
Start: 2019-11-12 | End: 2020-02-10

## 2019-11-12 NOTE — PROGRESS NOTES
Assessment/Plan:  As discussed you wish to continue the nortriptyline for sleep and will adjust sleep hygiene , avoid TV and if no improvement may consider change in medication in future  Will take Pepcid if needed for heartburn but if not helping or more frequent to call office    Continue f/u as scheduled with Rheumatologist later this month    Continue levothyroxine 25 mcg and script for labs as dated in March    Immunizations are UTD  No problem-specific Assessment & Plan notes found for this encounter  Diagnoses and all orders for this visit:    Hypothyroidism due to Hashimoto's thyroiditis  -     T4, free; Future  -     TSH, 3rd generation; Future    Other insomnia    Dyspepsia  -     famotidine (PEPCID) 20 mg tablet; Take 1 tablet (20 mg total) by mouth 2 (two) times a day as needed for heartburn    Rheumatoid arthritis involving multiple sites with positive rheumatoid factor (HCC)    Chronic pain syndrome  -     gabapentin (NEURONTIN) 300 mg capsule; Take 1 capsule (300 mg total) by mouth 3 (three) times a day    RLS (restless legs syndrome)    Other orders  -     Cancel: mirtazapine (REMERON) 15 mg tablet; Take 1 tablet (15 mg total) by mouth daily at bedtime          Subjective:      Patient ID: Anisa Barker is a 75 y o  female  Pt her for routine follow up  States needs refill of her Gabapentin, Insurance requires new Rx, s/p vertebral fracture and chronic pain  Renal studies normal    States the increased dose of medication nortriptyline  for sleeping does help but still sometimes difficulty, states sometimes at night just keep thinking and worrying about family, will watch TV and pray usually helps  Discussed sleep hygiene and avoiding TV, screens and pray is good and maybe read a book to get into story and relax her mind  Discussed consider changing med ie mirtazipine as can help sleep as well  Does not want to change her medication at this time      States restless leg has been good if bothersome will walk around and goes away, not wake her from sleep  Does get acid reflux about 2 X a week tried tums fang cruz not help, not want daily med so will give pepcid, had in past and seemed to help  No difficulty swallowing, no N/V  Discussed dietary modifications as well    Advised to stop the naproxen as can contribute to stomach pain but states has not taken in a long time    Has Rheumatology appt later this month continues with her MTX for RA, continues to get some pain, stiffness but denies any new symptoms  States last fall was 2-3 years ago    Denies any urinary incontinence but sometimes has to get up 2-3X a night, only a little water at bed to take meds, not feel a problem    Got flu vaccine at pharmacy in October is scanned into chart            The following portions of the patient's history were reviewed and updated as appropriate: allergies, current medications, past family history, past medical history, past social history, past surgical history and problem list     Review of Systems   Constitutional: Negative for chills, fatigue and fever  HENT: Negative  Respiratory: Negative  Negative for cough and shortness of breath  Cardiovascular: Negative  Negative for chest pain  Gastrointestinal: Positive for abdominal pain  Negative for constipation, diarrhea and vomiting  Genitourinary: Negative for frequency and urgency  Nocturia X 2-3 but denies any incontinence   Musculoskeletal: Positive for arthralgias, back pain and myalgias  Skin: Negative for rash  Neurological: Negative for dizziness, syncope, light-headedness and headaches  Psychiatric/Behavioral: Positive for sleep disturbance  Negative for decreased concentration  The patient is not nervous/anxious            Objective:      /78 (BP Location: Left arm, Patient Position: Sitting, Cuff Size: Adult)   Pulse 75   Temp 98 1 °F (36 7 °C) (Oral)   Ht 4' 10 5" (1 486 m)   Wt 45 2 kg (99 lb 10 4 oz) SpO2 96%   BMI 20 47 kg/m²          Physical Exam   Constitutional: She appears well-nourished  No distress  Eyes: Pupils are equal, round, and reactive to light  Conjunctivae are normal    Cardiovascular: Normal rate, regular rhythm and normal heart sounds  No murmur heard  Pulmonary/Chest: Effort normal and breath sounds normal  She has no wheezes  Abdominal: Soft  Bowel sounds are normal  She exhibits no distension  There is no tenderness  Musculoskeletal: She exhibits deformity  RA arthritic changes notes most prominently at PIP but also MCP and OA changes noted DIP, no erythema no swelling   Neurological: She is alert  Skin: No rash noted  Psychiatric: She has a normal mood and affect

## 2019-11-12 NOTE — PATIENT INSTRUCTIONS
As discussed you wish to continue the nortriptyline for sleep and will adjust sleep hygiene , avoid TV and if no improvement may consider change in medication in future      Will take Pepcid if needed for heartburn but if not helping or more frequent to call office    Continue f/u as scheduled with Rheumatologist later this month    Continue levothyroxine 25 mcg and script for labs as dated in March    Immunizations are UTD

## 2019-11-30 ENCOUNTER — APPOINTMENT (OUTPATIENT)
Dept: RADIOLOGY | Facility: HOSPITAL | Age: 74
DRG: 872 | End: 2019-11-30
Payer: MEDICARE

## 2019-11-30 ENCOUNTER — HOSPITAL ENCOUNTER (INPATIENT)
Facility: HOSPITAL | Age: 74
LOS: 6 days | Discharge: HOME HEALTH | DRG: 872 | End: 2019-12-07
Attending: EMERGENCY MEDICINE | Admitting: INTERNAL MEDICINE
Payer: MEDICARE

## 2019-11-30 DIAGNOSIS — R94.31 EKG ABNORMALITIES: ICD-10-CM

## 2019-11-30 DIAGNOSIS — R42 POSITIONAL LIGHTHEADEDNESS: ICD-10-CM

## 2019-11-30 DIAGNOSIS — A46 ERYSIPELAS OF LEFT LOWER EXTREMITY: ICD-10-CM

## 2019-11-30 DIAGNOSIS — M21.962 ACQUIRED FOOT DEFORMITY, LEFT: ICD-10-CM

## 2019-11-30 DIAGNOSIS — M05.79 RHEUMATOID ARTHRITIS INVOLVING MULTIPLE SITES WITH POSITIVE RHEUMATOID FACTOR (HCC): ICD-10-CM

## 2019-11-30 DIAGNOSIS — R77.8 ELEVATED TROPONIN: ICD-10-CM

## 2019-11-30 DIAGNOSIS — N39.0 UTI (URINARY TRACT INFECTION): ICD-10-CM

## 2019-11-30 DIAGNOSIS — L03.116 CELLULITIS OF LEFT LOWER EXTREMITY: Primary | ICD-10-CM

## 2019-11-30 DIAGNOSIS — K59.03 DRUG-INDUCED CONSTIPATION: ICD-10-CM

## 2019-11-30 DIAGNOSIS — R26.2 AMBULATORY DYSFUNCTION: ICD-10-CM

## 2019-11-30 DIAGNOSIS — R21 RASH OF BACK: ICD-10-CM

## 2019-11-30 DIAGNOSIS — A46 PATCH OF ERYSIPELAS: ICD-10-CM

## 2019-11-30 DIAGNOSIS — I31.9 MYOPERICARDITIS: ICD-10-CM

## 2019-11-30 PROBLEM — Z12.11 SCREENING FOR COLON CANCER: Status: RESOLVED | Noted: 2018-05-16 | Resolved: 2019-11-30

## 2019-11-30 PROBLEM — A41.9 SEPSIS (HCC): Status: ACTIVE | Noted: 2019-11-30

## 2019-11-30 PROBLEM — R82.81 PYURIA: Status: ACTIVE | Noted: 2019-11-30

## 2019-11-30 LAB
ALBUMIN SERPL BCP-MCNC: 3.8 G/DL (ref 3.5–5)
ALP SERPL-CCNC: 87 U/L (ref 46–116)
ALT SERPL W P-5'-P-CCNC: 32 U/L (ref 12–78)
ANION GAP SERPL CALCULATED.3IONS-SCNC: 5 MMOL/L (ref 4–13)
AST SERPL W P-5'-P-CCNC: 53 U/L (ref 5–45)
BACTERIA UR QL AUTO: ABNORMAL /HPF
BASOPHILS # BLD AUTO: 0.03 THOUSANDS/ΜL (ref 0–0.1)
BASOPHILS NFR BLD AUTO: 0 % (ref 0–1)
BILIRUB SERPL-MCNC: 0.65 MG/DL (ref 0.2–1)
BILIRUB UR QL STRIP: NEGATIVE
BUN SERPL-MCNC: 15 MG/DL (ref 5–25)
CALCIUM SERPL-MCNC: 9.3 MG/DL (ref 8.3–10.1)
CHLORIDE SERPL-SCNC: 104 MMOL/L (ref 100–108)
CLARITY UR: CLEAR
CO2 SERPL-SCNC: 28 MMOL/L (ref 21–32)
COLOR UR: YELLOW
COLOR, POC: NORMAL
CREAT SERPL-MCNC: 0.71 MG/DL (ref 0.6–1.3)
EOSINOPHIL # BLD AUTO: 0 THOUSAND/ΜL (ref 0–0.61)
EOSINOPHIL NFR BLD AUTO: 0 % (ref 0–6)
ERYTHROCYTE [DISTWIDTH] IN BLOOD BY AUTOMATED COUNT: 14.9 % (ref 11.6–15.1)
GFR SERPL CREATININE-BSD FRML MDRD: 84 ML/MIN/1.73SQ M
GLUCOSE SERPL-MCNC: 107 MG/DL (ref 65–140)
GLUCOSE UR STRIP-MCNC: NEGATIVE MG/DL
HCT VFR BLD AUTO: 38.3 % (ref 34.8–46.1)
HGB BLD-MCNC: 12 G/DL (ref 11.5–15.4)
HGB UR QL STRIP.AUTO: ABNORMAL
IMM GRANULOCYTES # BLD AUTO: 0.2 THOUSAND/UL (ref 0–0.2)
IMM GRANULOCYTES NFR BLD AUTO: 1 % (ref 0–2)
KETONES UR STRIP-MCNC: ABNORMAL MG/DL
LACTATE SERPL-SCNC: 1.7 MMOL/L (ref 0.5–2)
LEUKOCYTE ESTERASE UR QL STRIP: ABNORMAL
LYMPHOCYTES # BLD AUTO: 0.46 THOUSANDS/ΜL (ref 0.6–4.47)
LYMPHOCYTES NFR BLD AUTO: 3 % (ref 14–44)
MCH RBC QN AUTO: 29.4 PG (ref 26.8–34.3)
MCHC RBC AUTO-ENTMCNC: 31.3 G/DL (ref 31.4–37.4)
MCV RBC AUTO: 94 FL (ref 82–98)
MONOCYTES # BLD AUTO: 1.07 THOUSAND/ΜL (ref 0.17–1.22)
MONOCYTES NFR BLD AUTO: 6 % (ref 4–12)
NEUTROPHILS # BLD AUTO: 16.34 THOUSANDS/ΜL (ref 1.85–7.62)
NEUTS SEG NFR BLD AUTO: 90 % (ref 43–75)
NITRITE UR QL STRIP: POSITIVE
NON-SQ EPI CELLS URNS QL MICRO: ABNORMAL /HPF
NRBC BLD AUTO-RTO: 0 /100 WBCS
PH UR STRIP.AUTO: >=9 [PH] (ref 4.5–8)
PLATELET # BLD AUTO: 261 THOUSANDS/UL (ref 149–390)
PMV BLD AUTO: 8.9 FL (ref 8.9–12.7)
POTASSIUM SERPL-SCNC: 4.8 MMOL/L (ref 3.5–5.3)
PROT SERPL-MCNC: 7.6 G/DL (ref 6.4–8.2)
PROT UR STRIP-MCNC: ABNORMAL MG/DL
RBC # BLD AUTO: 4.08 MILLION/UL (ref 3.81–5.12)
RBC #/AREA URNS AUTO: ABNORMAL /HPF
SODIUM SERPL-SCNC: 137 MMOL/L (ref 136–145)
SP GR UR STRIP.AUTO: 1.01 (ref 1–1.03)
TROPONIN I SERPL-MCNC: <0.02 NG/ML
TSH SERPL DL<=0.05 MIU/L-ACNC: 1.29 UIU/ML (ref 0.36–3.74)
UROBILINOGEN UR QL STRIP.AUTO: 0.2 E.U./DL
WBC # BLD AUTO: 18.1 THOUSAND/UL (ref 4.31–10.16)
WBC #/AREA URNS AUTO: ABNORMAL /HPF

## 2019-11-30 PROCEDURE — 87040 BLOOD CULTURE FOR BACTERIA: CPT | Performed by: STUDENT IN AN ORGANIZED HEALTH CARE EDUCATION/TRAINING PROGRAM

## 2019-11-30 PROCEDURE — 86039 ANTINUCLEAR ANTIBODIES (ANA): CPT | Performed by: STUDENT IN AN ORGANIZED HEALTH CARE EDUCATION/TRAINING PROGRAM

## 2019-11-30 PROCEDURE — 87086 URINE CULTURE/COLONY COUNT: CPT

## 2019-11-30 PROCEDURE — 99284 EMERGENCY DEPT VISIT MOD MDM: CPT | Performed by: EMERGENCY MEDICINE

## 2019-11-30 PROCEDURE — 81001 URINALYSIS AUTO W/SCOPE: CPT

## 2019-11-30 PROCEDURE — 86038 ANTINUCLEAR ANTIBODIES: CPT | Performed by: STUDENT IN AN ORGANIZED HEALTH CARE EDUCATION/TRAINING PROGRAM

## 2019-11-30 PROCEDURE — 84484 ASSAY OF TROPONIN QUANT: CPT | Performed by: STUDENT IN AN ORGANIZED HEALTH CARE EDUCATION/TRAINING PROGRAM

## 2019-11-30 PROCEDURE — 36415 COLL VENOUS BLD VENIPUNCTURE: CPT | Performed by: EMERGENCY MEDICINE

## 2019-11-30 PROCEDURE — 96365 THER/PROPH/DIAG IV INF INIT: CPT

## 2019-11-30 PROCEDURE — 80053 COMPREHEN METABOLIC PANEL: CPT | Performed by: EMERGENCY MEDICINE

## 2019-11-30 PROCEDURE — 83605 ASSAY OF LACTIC ACID: CPT | Performed by: STUDENT IN AN ORGANIZED HEALTH CARE EDUCATION/TRAINING PROGRAM

## 2019-11-30 PROCEDURE — 87081 CULTURE SCREEN ONLY: CPT | Performed by: STUDENT IN AN ORGANIZED HEALTH CARE EDUCATION/TRAINING PROGRAM

## 2019-11-30 PROCEDURE — 87077 CULTURE AEROBIC IDENTIFY: CPT

## 2019-11-30 PROCEDURE — 85025 COMPLETE CBC W/AUTO DIFF WBC: CPT | Performed by: EMERGENCY MEDICINE

## 2019-11-30 PROCEDURE — 93005 ELECTROCARDIOGRAM TRACING: CPT

## 2019-11-30 PROCEDURE — 84443 ASSAY THYROID STIM HORMONE: CPT | Performed by: STUDENT IN AN ORGANIZED HEALTH CARE EDUCATION/TRAINING PROGRAM

## 2019-11-30 PROCEDURE — 84484 ASSAY OF TROPONIN QUANT: CPT | Performed by: EMERGENCY MEDICINE

## 2019-11-30 PROCEDURE — 96367 TX/PROPH/DG ADDL SEQ IV INF: CPT

## 2019-11-30 PROCEDURE — 87186 SC STD MICRODIL/AGAR DIL: CPT

## 2019-11-30 PROCEDURE — 73590 X-RAY EXAM OF LOWER LEG: CPT

## 2019-11-30 PROCEDURE — 99285 EMERGENCY DEPT VISIT HI MDM: CPT

## 2019-11-30 PROCEDURE — 73630 X-RAY EXAM OF FOOT: CPT

## 2019-11-30 RX ORDER — CEFAZOLIN SODIUM 2 G/50ML
2000 SOLUTION INTRAVENOUS EVERY 8 HOURS
Status: DISCONTINUED | OUTPATIENT
Start: 2019-11-30 | End: 2019-12-07 | Stop reason: HOSPADM

## 2019-11-30 RX ORDER — GABAPENTIN 300 MG/1
300 CAPSULE ORAL 3 TIMES DAILY
Status: DISCONTINUED | OUTPATIENT
Start: 2019-11-30 | End: 2019-12-07 | Stop reason: HOSPADM

## 2019-11-30 RX ORDER — VANCOMYCIN HYDROCHLORIDE 500 MG/100ML
500 INJECTION, SOLUTION INTRAVENOUS EVERY 12 HOURS
Status: DISCONTINUED | OUTPATIENT
Start: 2019-11-30 | End: 2019-12-01

## 2019-11-30 RX ORDER — OXYCODONE HYDROCHLORIDE 5 MG/1
2.5 TABLET ORAL ONCE
Status: COMPLETED | OUTPATIENT
Start: 2019-11-30 | End: 2019-11-30

## 2019-11-30 RX ORDER — NORTRIPTYLINE HYDROCHLORIDE 50 MG/1
50 CAPSULE ORAL
Status: DISCONTINUED | OUTPATIENT
Start: 2019-11-30 | End: 2019-12-07 | Stop reason: HOSPADM

## 2019-11-30 RX ORDER — ACETAMINOPHEN 325 MG/1
325 TABLET ORAL EVERY 6 HOURS PRN
Status: DISCONTINUED | OUTPATIENT
Start: 2019-11-30 | End: 2019-12-01

## 2019-11-30 RX ORDER — SODIUM CHLORIDE, SODIUM GLUCONATE, SODIUM ACETATE, POTASSIUM CHLORIDE, MAGNESIUM CHLORIDE, SODIUM PHOSPHATE, DIBASIC, AND POTASSIUM PHOSPHATE .53; .5; .37; .037; .03; .012; .00082 G/100ML; G/100ML; G/100ML; G/100ML; G/100ML; G/100ML; G/100ML
500 INJECTION, SOLUTION INTRAVENOUS ONCE
Status: COMPLETED | OUTPATIENT
Start: 2019-11-30 | End: 2019-11-30

## 2019-11-30 RX ORDER — LEVOTHYROXINE SODIUM 0.03 MG/1
25 TABLET ORAL
Status: DISCONTINUED | OUTPATIENT
Start: 2019-12-01 | End: 2019-12-07 | Stop reason: HOSPADM

## 2019-11-30 RX ORDER — PREDNISONE 1 MG/1
1 TABLET ORAL DAILY
Status: DISCONTINUED | OUTPATIENT
Start: 2019-12-01 | End: 2019-12-01

## 2019-11-30 RX ORDER — FAMOTIDINE 20 MG/1
20 TABLET, FILM COATED ORAL 2 TIMES DAILY PRN
Status: DISCONTINUED | OUTPATIENT
Start: 2019-11-30 | End: 2019-12-02

## 2019-11-30 RX ORDER — FOLIC ACID 1 MG/1
1000 TABLET ORAL DAILY
Status: DISCONTINUED | OUTPATIENT
Start: 2019-12-01 | End: 2019-12-07 | Stop reason: HOSPADM

## 2019-11-30 RX ORDER — SODIUM CHLORIDE, SODIUM GLUCONATE, SODIUM ACETATE, POTASSIUM CHLORIDE, MAGNESIUM CHLORIDE, SODIUM PHOSPHATE, DIBASIC, AND POTASSIUM PHOSPHATE .53; .5; .37; .037; .03; .012; .00082 G/100ML; G/100ML; G/100ML; G/100ML; G/100ML; G/100ML; G/100ML
75 INJECTION, SOLUTION INTRAVENOUS CONTINUOUS
Status: DISCONTINUED | OUTPATIENT
Start: 2019-11-30 | End: 2019-12-02

## 2019-11-30 RX ORDER — DOCUSATE SODIUM 100 MG/1
100 CAPSULE, LIQUID FILLED ORAL 2 TIMES DAILY
Status: DISCONTINUED | OUTPATIENT
Start: 2019-11-30 | End: 2019-12-07 | Stop reason: HOSPADM

## 2019-11-30 RX ADMIN — GABAPENTIN 300 MG: 300 CAPSULE ORAL at 19:54

## 2019-11-30 RX ADMIN — CEFAZOLIN SODIUM 2000 MG: 2 SOLUTION INTRAVENOUS at 19:20

## 2019-11-30 RX ADMIN — OXYCODONE HYDROCHLORIDE 2.5 MG: 5 TABLET ORAL at 13:26

## 2019-11-30 RX ADMIN — CEFTRIAXONE SODIUM 1000 MG: 10 INJECTION, POWDER, FOR SOLUTION INTRAVENOUS at 14:36

## 2019-11-30 RX ADMIN — PSYLLIUM HUSK 1 PACKET: 3.4 POWDER ORAL at 19:54

## 2019-11-30 RX ADMIN — DOCUSATE SODIUM 100 MG: 100 CAPSULE, LIQUID FILLED ORAL at 19:54

## 2019-11-30 RX ADMIN — SODIUM CHLORIDE, SODIUM GLUCONATE, SODIUM ACETATE, POTASSIUM CHLORIDE AND MAGNESIUM CHLORIDE 75 ML/HR: 526; 502; 368; 37; 30 INJECTION, SOLUTION INTRAVENOUS at 19:50

## 2019-11-30 RX ADMIN — VANCOMYCIN HYDROCHLORIDE 500 MG: 500 INJECTION, SOLUTION INTRAVENOUS at 19:50

## 2019-11-30 RX ADMIN — OXYCODONE HYDROCHLORIDE 2.5 MG: 5 TABLET ORAL at 21:43

## 2019-11-30 RX ADMIN — SODIUM CHLORIDE, SODIUM GLUCONATE, SODIUM ACETATE, POTASSIUM CHLORIDE, MAGNESIUM CHLORIDE, SODIUM PHOSPHATE, DIBASIC, AND POTASSIUM PHOSPHATE 500 ML: .53; .5; .37; .037; .03; .012; .00082 INJECTION, SOLUTION INTRAVENOUS at 13:46

## 2019-11-30 RX ADMIN — NORTRIPTYLINE HYDROCHLORIDE 50 MG: 50 CAPSULE ORAL at 21:20

## 2019-11-30 NOTE — ED NOTES
Attempted to assist pt to the bathroom to provide urine sample  Pt requested to use bedpan instead        Miracle Haley  49[birth year]34 [id number]

## 2019-11-30 NOTE — PROGRESS NOTES
[facility] Senior Admission Note   Unit/Bed # @DBLINK (FLAKITO,53110)@ Encounter: [id number]  [address] 75 y o  female [id number]       Patient seen and examined  Reviewed H&P per Dr Maryam Levine  Agree with the assessment and plan except NA       Assessment/Plan: Principal Problem:    Sepsis ([address] )  Active Problems:    Peripheral vascular disease (HCC)    Hypothyroidism    RBBB    Rheumatoid arthritis involving multiple sites with positive rheumatoid factor (HCC)    Malar rash    Cellulitis of left lower extremity    Abnormal EKG    Pyuria         Disposition:  OBSERVATION    Expected LOS: <2 Nick Medina MD

## 2019-11-30 NOTE — ED PROVIDER NOTES
History  Chief Complaint   Patient presents with    Medical Problem     Pt c/o left leg redness and swelling and facial redness and swelling since Wednesday with dizzyness  Patient is a 79-year-old female with a past medical history of rheumatoid arthritis on methotrexate and prednisone daily, hypothyroidism, who presents to the emergency department for evaluation of rash, chills, lightheadedness  Her rash is located to areas, over her face and over her left lower extremity shin  The rash on her face started Wednesday, started gradually  She put topical Benadryl on the rash, it did not help her symptoms  She does have a prior history of this and was treated with Keflex for infection in July of this year for similar rash  She states the rash on her left lower extremities started this morning, as well as the edema  She describes her rash as painful to touch, it is not pleuritic, has not been draining any fluid, has been worsening  She is also complaining of lightheadedness, that is more positional in nature, denies vertigo,  or syncope  She denies chest pain, shortness of breath, cough, palpitations, abdominal pain, nausea, vomiting, urinary symptoms, no fever  History provided by:  Patient   used: No    Medical Problem   Onset quality:  Gradual  Timing:  Constant  Progression:  Worsening  Chronicity:  Recurrent  Associated symptoms: rash    Associated symptoms: no abdominal pain, no chest pain, no cough, no diarrhea, no fever, no headaches, no nausea, no shortness of breath and no vomiting        Prior to Admission Medications   Prescriptions Last Dose Informant Patient Reported? Taking?    Ascorbic Acid (VITAMIN C) 1000 MG tablet 11/30/2019 at Unknown time Self Yes Yes   Sig: Take 1,000 mg by mouth daily   Multiple Vitamins-Minerals (CENTRUM SILVER PO) 11/30/2019 at Unknown time Self Yes Yes   Sig: Take 1 tablet by mouth daily   Psyllium (METAMUCIL FIBER PO) 11/30/2019 at Unknown time Self Yes Yes   Sig: Take 1 packet by mouth 3 (three) times a day     Vitamin E 400 units TABS 11/30/2019 at Unknown time Self Yes Yes   Sig: Take 400 Units by mouth daily     acetaminophen (TYLENOL) 325 mg tablet 11/30/2019 at Unknown time Self No Yes   Sig: Take prn for mild pain   cholecalciferol (VITAMIN D3) 1,000 units tablet 11/30/2019 at Unknown time Self Yes Yes   Sig: Take 1,000 Units by mouth daily   docusate sodium (COLACE) 100 mg capsule 11/30/2019 at Unknown time Self No Yes   Sig: Take 1 capsule by mouth 2 (two) times a day   famotidine (PEPCID) 20 mg tablet 11/30/2019 at Unknown time  No Yes   Sig: Take 1 tablet (20 mg total) by mouth 2 (two) times a day as needed for heartburn   folic acid (FOLVITE) 1 mg tablet 11/30/2019 at Unknown time Self No Yes   Sig: TAKE ONE TABLET BY MOUTH EVERY DAY   gabapentin (NEURONTIN) 300 mg capsule Unknown at Unknown time  No No   Sig: Take 1 capsule (300 mg total) by mouth 3 (three) times a day   levothyroxine 25 mcg tablet 11/30/2019 at Unknown time  No Yes   Sig: Take 1 tablet (25 mcg total) by mouth daily   methotrexate 2 5 MG tablet Past Week at Unknown time Self Yes Yes   Sig: Take by mouth 4 tablets by mouth once a week on monday   nortriptyline (PAMELOR) 50 mg capsule 11/30/2019 at Unknown time  No Yes   Sig: Take 1 capsule (50 mg total) by mouth daily at bedtime   predniSONE 1 mg tablet 11/30/2019 at Unknown time  Yes Yes      Facility-Administered Medications: None       Past Medical History:   Diagnosis Date    Anxiety     Depression     Disease of thyroid gland     HYPO    Femur neck fracture (HCC)     GERD (gastroesophageal reflux disease)     Hyperthyroidism     RESOLVED: [id number]    Osteoporosis     Polio     PVD [facility]peripheral vascular disease) ([facility] [address] )     Right BBB/left ant fasc block     UTI (urinary tract infection)     Varicella infection     LAST ASSESSED: [id number]       Past Surgical History:   Procedure Laterality Date    APPENDECTOMY      HIP ARTHROPLASTY Left 11/27/2017    Procedure: REMOVAL IM NAIL CONVERSION TO TOTAL HIP ARTHROPLASTY POSTERIOR APPROACH;  Surgeon: Nadine Kelly MD;  Location: [facility];  Service: Orthopedics    HIP FRACTURE SURGERY      HIP SURGERY      both hips replaced;2013 left ,2014 right    JOINT REPLACEMENT Right     HIP TOTAL    ME CONV PREV HIP SURG TO TOT HIP [place] Left 10/4/2017    Procedure: Leann Barnes removal of left hip;  Surgeon: Nadine Kelly MD;  Location: [facility];  Service: Orthopedics    REVISION TOTAL HIP ARTHROPLASTY Right     TONSILLECTOMY         Family History   Problem Relation Age of Onset    Rheum arthritis Mother     Rheum arthritis Sister     Prostate cancer Brother      I have reviewed and agree with the history as documented  Social History     Tobacco Use    Smoking status: Former Smoker    Smokeless tobacco: Never Used    Tobacco comment: quit 20-30 years ago; NEVER A SMOKER AS PER ALL SCRIPTS    Substance Use Topics    Alcohol use: No    Drug use: No        Review of Systems   Constitutional: Positive for chills  Negative for appetite change and fever  HENT: Negative  Eyes: Negative  Negative for photophobia and visual disturbance  Respiratory: Negative  Negative for cough, chest tightness and shortness of breath  Cardiovascular: Positive for leg swelling  Negative for chest pain and palpitations  Gastrointestinal: Negative  Negative for abdominal pain, blood in stool, constipation, diarrhea, nausea and vomiting  Endocrine: Negative  Genitourinary: Negative  Negative for difficulty urinating, dysuria, flank pain, frequency and urgency  Musculoskeletal: Negative  Negative for back pain, neck pain and neck stiffness  Skin: Positive for rash  Allergic/Immunologic: Negative  Neurological: Positive for light-headedness  Negative for dizziness, weakness and headaches  Hematological: Negative      Psychiatric/Behavioral: Negative  Physical Exam  ED Triage Vitals   Temperature Pulse Respirations Blood Pressure SpO2   11/30/19 1257 11/30/19 1257 11/30/19 1257 11/30/19 1257 11/30/19 1400   98 2 °F (36 8 °C) 102 17 141/77 99 %      Temp Source Heart Rate Source Patient Position - Orthostatic VS BP Location FiO2 (%)   11/30/19 1257 11/30/19 1257 11/30/19 1257 11/30/19 1257 --   Oral Monitor Lying Right arm       Pain Score       11/30/19 1257       5             Orthostatic Vital Signs  Vitals:    11/30/19 1445 11/30/19 1513 11/30/19 1530 11/30/19 1556   BP: 151/76 131/60 158/78 155/82   Pulse: 99 97 96 96   Patient Position - Orthostatic VS: Sitting Lying  Sitting       Physical Exam   Constitutional: She is oriented to person, place, and time  She appears well-developed and well-nourished  HENT:   Head: Normocephalic and atraumatic  Right Ear: External ear normal    Left Ear: External ear normal    Nose: Nose normal    Mouth/Throat: Oropharynx is clear and moist    Eyes: Conjunctivae and EOM are normal  No scleral icterus  Neck: Normal range of motion  No JVD present  No tracheal deviation present  Cardiovascular: Regular rhythm, normal heart sounds and intact distal pulses  No murmur heard  Tachycardia   Pulmonary/Chest: Effort normal and breath sounds normal  No respiratory distress  Abdominal: Soft  Bowel sounds are normal  She exhibits no distension  There is no tenderness  There is no guarding  Musculoskeletal: Normal range of motion  She exhibits edema  Left lower extremity pitting edema to the level of the mid calf, no calf tenderness   Neurological: She is alert and oriented to person, place, and time  She exhibits normal muscle tone  Skin: Skin is warm and dry  Capillary refill takes less than 2 seconds  Petechiae and rash noted  Rash is macular  She is not diaphoretic     Macular erythematous and edematous rash over the patient's face, right zygomatic arch and infraorbital region, as well as over the bridge of her nose and left zygomatic arch  Macular erythematous rash left lower extremity, demarcated with petechiae   Psychiatric: She has a normal mood and affect  Her behavior is normal    Vitals reviewed        ED Medications  Medications   acetaminophen (TYLENOL) tablet 325 mg (has no administration in time range)   docusate sodium (COLACE) capsule 100 mg (has no administration in time range)   famotidine (PEPCID) tablet 20 mg (has no administration in time range)   folic acid (FOLVITE) tablet 1,000 mcg (has no administration in time range)   gabapentin (NEURONTIN) capsule 300 mg (has no administration in time range)   levothyroxine tablet 25 mcg (has no administration in time range)   methotrexate tablet 10 mg (has no administration in time range)   nortriptyline (PAMELOR) capsule 50 mg (has no administration in time range)   predniSONE tablet 1 mg (has no administration in time range)   psyllium (METAMUCIL) 1 packet (has no administration in time range)   multi-electrolyte (PLASMALYTE-A/ISOLYTE-S PH 7 4) IV solution (has no administration in time range)   enoxaparin (LOVENOX) subcutaneous injection 40 mg (has no administration in time range)   vancomycin (VANCOCIN) IVPB (premix) 750 mg (has no administration in time range)   ceFAZolin (ANCEF) IVPB (premix) 2,000 mg (has no administration in time range)   oxyCODONE (ROXICODONE) IR tablet 2 5 mg (2 5 mg Oral Given 11/30/19 1326)   multi-electrolyte (ISOLYTE-S PH 7 4) bolus 500 mL (0 mL Intravenous Stopped 11/30/19 1435)   ceftriaxone (ROCEPHIN) 1 g/50 mL in dextrose IVPB (0 mg Intravenous Stopped 11/30/19 1543)       Diagnostic Studies  Results Reviewed     Procedure Component Value Units Date/Time    MRSA culture [[id number]]     Lab Status:  No result Specimen:  Nares     LISA Screen w/ Reflex to Titer/Pattern [[id number]]     Lab Status:  No result Specimen:  Blood     Lactic acid, plasma [[id number]]     Lab Status:  No result Specimen:  Blood     Blood culture [[id number]]     Lab Status:  No result Specimen:  Blood     Blood culture [[id number]]     Lab Status:  No result Specimen:  Blood     Troponin I [[id number]]     Lab Status:  No result Specimen:  Blood     Troponin I [[id number]]     Lab Status:  No result Specimen:  Blood     TSH, 3rd generation [[id number]]     Lab Status:  No result Specimen:  Blood     Urine Microscopic [[id number]]  (Abnormal) Collected:  11/30/19 1427    Lab Status:  Final result Specimen:  Urine, Clean Catch Updated:  11/30/19 1629     RBC, UA 4-10 /hpf      WBC, UA 30-50 /hpf      Epithelial Cells Occasional /hpf      Bacteria, UA Innumerable /hpf     Urine culture [[id number]] Collected:  11/30/19 1427    Lab Status:   In process Specimen:  Urine, Clean Catch Updated:  11/30/19 1629    POCT urinalysis dipstick [[id number]]  (Normal) Resulted:  11/30/19 1428    Lab Status:  Final result Updated:  11/30/19 1428     Color, UA see chart    Urine Macroscopic, POC [[id number]]  (Abnormal) Collected:  11/30/19 1427    Lab Status:  Final result Specimen:  Urine Updated:  11/30/19 1426     Color, UA Yellow     Clarity, UA Clear     pH, UA >=9 0     Leukocytes, UA Moderate     Nitrite, UA Positive     Protein, UA 30 (1+) mg/dl      Glucose, UA Negative mg/dl      Ketones, UA Trace mg/dl      Urobilinogen, UA 0 2 E U /dl      Bilirubin, UA Negative     Blood, UA Moderate     Specific [place], UA 1 010    Narrative:       CLINITEK RESULT    Comprehensive metabolic panel [[id number]]  (Abnormal) Collected:  11/30/19 1338    Lab Status:  Final result Specimen:  Blood from Arm, Right Updated:  11/30/19 1421     Sodium 137 mmol/L      Potassium 4 8 mmol/L      Chloride 104 mmol/L      CO2 28 mmol/L      ANION GAP 5 mmol/L      BUN 15 mg/dL      Creatinine 0 71 mg/dL      Glucose 107 mg/dL      Calcium 9 3 mg/dL      AST 53 U/L      ALT 32 U/L      Alkaline Phosphatase 87 U/L      Total Protein 7 6 g/dL      Albumin 3 8 g/dL      Total Bilirubin 0 65 mg/dL eGFR 84 ml/min/1 73sq m     Narrative:       [place] guidelines for Chronic Kidney Disease (CKD):     Stage 1 with normal or high GFR (GFR > 90 mL/min/1 73 square meters)    Stage 2 Mild CKD (GFR = 60-89 mL/min/1 73 square meters)    Stage 3A Moderate CKD (GFR = 45-59 mL/min/1 73 square meters)    Stage 3B Moderate CKD (GFR = 30-44 mL/min/1 73 square meters)    Stage 4 Severe CKD (GFR = 15-29 mL/min/1 73 square meters)    Stage 5 End Stage CKD (GFR <15 mL/min/1 73 square meters)  Note: GFR calculation is accurate only with a steady state creatinine    Troponin I [[id number]]  (Normal) Collected:  11/30/19 1338    Lab Status:  Final result Specimen:  Blood from Arm, Right Updated:  11/30/19 1415     Troponin I <0 02 ng/mL     CBC and differential [[id number]]  (Abnormal) Collected:  11/30/19 1338    Lab Status:  Final result Specimen:  Blood from Arm, Right Updated:  11/30/19 1414     WBC 18 10 Thousand/uL      RBC 4 08 Million/uL      Hemoglobin 12 0 g/dL      Hematocrit 38 3 %      MCV 94 fL      MCH 29 4 pg      MCHC 31 3 g/dL      RDW 14 9 %      MPV 8 9 fL      Platelets 848 Thousands/uL      nRBC 0 /100 WBCs      Neutrophils Relative 90 %      Immat GRANS % 1 %      Lymphocytes Relative 3 %      Monocytes Relative 6 %      Eosinophils Relative 0 %      Basophils Relative 0 %      Neutrophils Absolute 16 34 Thousands/µL      Immature Grans Absolute 0 20 Thousand/uL      Lymphocytes Absolute 0 46 Thousands/µL      Monocytes Absolute 1 07 Thousand/µL      Eosinophils Absolute 0 00 Thousand/µL      Basophils Absolute 0 03 Thousands/µL     Narrative: This is an appended report  These results have been appended to a previously verified report                   XR foot 3+ vw left    (Results Pending)   XR ankle 3+ vw left    (Results Pending)   XR tibia fibula 2 vw left    (Results Pending)         Procedures  Procedures        ED Course           Identification of Seniors at Risk      Most Recent Value   (ISAR) Identification of Seniors at Risk   Before the illness or injury that brought you to the Emergency, did you need someone to help you on a regular basis? 0 Filed at: 11/30/2019 1252   In the last 24 hours, have you needed more help than usual?  0 Filed at: 11/30/2019 1252   Have you been hospitalized for one or more nights during the past 6 months? 1 Filed at: 11/30/2019 1252   In general, do you see well?  0 Filed at: 11/30/2019 1252   In general, do you have serious problems with your memory? 0 Filed at: 11/30/2019 1252   Do you take more than three different medications every day?   1 Filed at: 11/30/2019 1252   ISAR Score  2 Filed at: 11/30/2019 1252                          MDM  Number of Diagnoses or Management Options  EKG abnormalities: new and requires workup  Erysipelas of left lower extremity: new and does not require workup  Patch of erysipelas: new and does not require workup  Positional lightheadedness: new and requires workup  UTI (urinary tract infection): new and requires workup  Diagnosis management comments: IV fluids, labs  Rash is consistent with erysipelas, will give Rocephin  EKG shows new depressions and T-wave inversions, troponin within normal limits and patient is asymptomatic currently  Admitted to Medicine for erysipelas, positional lightheadedness, EKG changes, urinary tract infection       Amount and/or Complexity of Data Reviewed  Clinical lab tests: ordered and reviewed  Review and summarize past medical records: yes  Independent visualization of images, tracings, or specimens: yes        Disposition  Final diagnoses:   UTI (urinary tract infection)   Erysipelas of left lower extremity   Patch of erysipelas - Face   Positional lightheadedness   EKG abnormalities     Time reflects when diagnosis was documented in both MDM as applicable and the Disposition within this note     Time User Action Codes Description Comment    11/30/2019  4:11 PM Jatin Gage Add [N39 0] UTI (urinary tract infection)     11/30/2019  4:11 PM Jatin Gage Add [A46] Erysipelas of left lower extremity     11/30/2019  4:11 PM Jatin Gage Add [A46] Patch of erysipelas     11/30/2019  4:11 PM Jaitn Gage Modify [A46] Patch of erysipelas Face    11/30/2019  4:36 PM Jatin Gage Add [R42] Positional lightheadedness     11/30/2019  4:37 PM Jatin Gage Add [R94 31] EKG abnormalities     11/30/2019  4:37 PM Jatin Gage Modify [N39 0] UTI (urinary tract infection)     11/30/2019  4:37 PM Jatin Gage Modify [R94 31] EKG abnormalities     11/30/2019  4:37 PM Jatin Gage Modify [R42] Positional lightheadedness     11/30/2019  4:37 PM Jatin Gage Modify [R94 31] EKG abnormalities     11/30/2019  4:37 PM Jatin Gage Modify [A46] Erysipelas of left lower extremity     11/30/2019  4:37 PM Jatin Gage Modify [R42] Positional lightheadedness     11/30/2019  4:53 PM Micaela Monreal Add [M04 628] Cellulitis of left lower extremity       ED Disposition     ED Disposition Condition Date/Time Comment    Admit Stable Sat Nov 30, 2019  4:12 PM Case was discussed with SOD and the patient's admission status was agreed to be Admission Status: observation status to the service of Dr Jerrie Sandifer           Follow-up Information    None         Current Discharge Medication List      CONTINUE these medications which have NOT CHANGED    Details   acetaminophen (TYLENOL) 325 mg tablet Take prn for mild pain  Qty: 30 tablet, Refills: 0    Associated Diagnoses: Arthritis of left hip      Ascorbic Acid (VITAMIN C) 1000 MG tablet Take 1,000 mg by mouth daily      cholecalciferol (VITAMIN D3) 1,000 units tablet Take 1,000 Units by mouth daily      docusate sodium (COLACE) 100 mg capsule Take 1 capsule by mouth 2 (two) times a day  Qty: 10 capsule, Refills: 0      famotidine (PEPCID) 20 mg tablet Take 1 tablet (20 mg total) by mouth 2 (two) times a day as needed for heartburn  Qty: 60 tablet, Refills: 1    Associated Diagnoses: Dyspepsia      folic acid (FOLVITE) 1 mg tablet TAKE ONE TABLET BY MOUTH EVERY DAY  Qty: 30 tablet, Refills: 2    Associated Diagnoses: RLS (restless legs syndrome)      levothyroxine 25 mcg tablet Take 1 tablet (25 mcg total) by mouth daily  Qty: 90 tablet, Refills: 1    Associated Diagnoses: Hypothyroidism, unspecified type      methotrexate 2 5 MG tablet Take by mouth 4 tablets by mouth once a week on monday      Multiple Vitamins-Minerals (CENTRUM SILVER PO) Take 1 tablet by mouth daily      nortriptyline (PAMELOR) 50 mg capsule Take 1 capsule (50 mg total) by mouth daily at bedtime  Qty: 90 capsule, Refills: 0    Associated Diagnoses: Other insomnia      predniSONE 1 mg tablet Refills: 0      Psyllium (METAMUCIL FIBER PO) Take 1 packet by mouth 3 (three) times a day        Vitamin E 400 units TABS Take 400 Units by mouth daily        gabapentin (NEURONTIN) 300 mg capsule Take 1 capsule (300 mg total) by mouth 3 (three) times a day  Qty: 270 capsule, Refills: 1    Comments: Needs 180 for frequency chg  Associated Diagnoses: Chronic pain syndrome           No discharge procedures on file  ED Provider  Attending physically available and evaluated Geetha Rivera I managed the patient along with the ED Attending      Electronically Signed by         Shannon Maya DO  11/30/19 3163

## 2019-11-30 NOTE — H&P
INTERNAL MEDICINE RESIDENCY ADMISSION H&P     Name: Michaela Hopkins   Age & Sex: 75 y o  female   MRN: [id number]  Unit/Bed#: LILIANA   Encounter: [id number]  Primary Care Provider: Harvinder Guerrero PA-C    Code Status: Prior  Admission Status: Observation  Disposition: Patient requires     ASSESSMENT/PLAN     Principal Problem:    Sepsis [facility])  Active Problems:    Peripheral vascular disease ([address] )    Hypothyroidism    RBBB    Rheumatoid arthritis involving multiple sites with positive rheumatoid factor (HCC)    Malar rash    Cellulitis of left lower extremity    Abnormal EKG    Pyuria    1  Sepsis - patient presented with leukocytosis and tachycardia with suspicion for infection of lower extremity given pain, swelling and erythema likely secondary to strep cellulitis  Unlikely MRSA however will cover considering patient meets sepsis criteria  No evidence of purulence, or crepitus  Patient received 1 g IV ceftriaxone in the ED  Currently afebrile, complaining of chills and continued pain  - x-ray ankle, foot, tib-fib of left extremity  - lactic acid, CBC, BMP  - blood culture x2  - vancomycin / cefazolin  - IV fluids at 75  - consult pharmacology for vanc trough  - MRSA culture  - wound care  - oxycodone for pain    2  Rheumatoid arthritis   - prednisone 1 mg a day  - methotrexate    3  Abnormal EKG - evidence of inverted T-waves in V3 and V4 with possible ST depressions in the inferior leads, changes from prior studies  - follow-up EKG  - lactate  - trend troponin  - consider repeat echo or outpatient stress test    4  Malar rash - rash does appear to spare the nasal labial fold  - follow LISA    5  Hypothyroidism  - continue levothyroxine    No new Assessment & Plan notes have been filed under this hospital service since the last note was generated    Service: Internal Medicine      VTE Pharmacologic Prophylaxis: Heparin  VTE Mechanical Prophylaxis: sequential compression device    CHIEF COMPLAINT     Chief Complaint   Patient presents with    Medical Problem     Pt c/o left leg redness and swelling and facial redness and swelling since Wednesday with dizzyness  HISTORY OF PRESENT ILLNESS     Patient is a 76-year-old female with history of hypothyroidism, rheumatoid arthritis on prednisone and methotrexate, vitamin-D deficiency, age-related osteoporosis presented to the [facility] ED with left lower extremity swelling, erythema, and pain noted as stabbing in quality that is intermittent in duration  According to the patient, she had a "corn" on the bottom of her left foot that she was shaving down at home which proceeded to become painful and red  The rash started to spread superiorly to the mid shin as well as sporadic patches throughout her left upper thigh  She also is complaining of chills associated with the infection  Moreover, a malar-like rash was noted on her face  She mentioned that she had the rash previously and was treated with antibiotics in the emergency department  Given her history for multiple autoimmune diseases further lab work was ordered  Given the presentation of her left lower extremity swelling and erythema she was admitted for observation and workup for presumed cellulitis  In addition, her EKG was significant for changes involving new T-wave inversions in V3 and V4 and possible ST depressions in the inferior leads  Additional troponin and follow-up EKG was ordered  The patient denies any recent changes in her medications  She denies fever, nausea, vomiting, aches, or shortness of breath  She denies pain with urination or urinary frequency  REVIEW OF SYSTEMS     Review of Systems   HENT:        Rash on face   Skin:        Pain and swelling LLE    All other systems reviewed and are negative      OBJECTIVE     Vitals:    11/30/19 1445 11/30/19 1513 11/30/19 1530 11/30/19 1556   BP: 151/76 131/60 158/78 155/82   BP Location: Right arm Right arm  Left arm   Pulse: 99 97 96 96 Resp: 17 18 17 17   Temp:       TempSrc:       SpO2: 98% 100% 100% 97%   Weight:       Height:          Temperature:   Temp (24hrs), Av 2 °F (36 8 °C), Min:98 2 °F (36 8 °C), Max:98 2 °F (36 8 °C)    Temperature: 98 2 °F (36 8 °C)  Intake & Output:  I/O        07 -  0700  07 -  0700  07 -  0700    I V  (mL/kg)   500 (11)    IV Piggyback   50    Total Intake(mL/kg)   550 (12 1)    Net   +550               Weights:   IBW: 45 5 kg    Body mass index is 19 53 kg/m²  Weight (last 2 days)     Date/Time   Weight    19 1257   45 4 (100)            Physical Exam   Constitutional: She appears well-developed and well-nourished  No distress  HENT:   Head: Normocephalic and atraumatic  Eyes: Pupils are equal, round, and reactive to light  Conjunctivae and EOM are normal    Neck: Normal range of motion  Cardiovascular:   tachycardic   Pulmonary/Chest: Effort normal and breath sounds normal    Abdominal: Soft  Bowel sounds are normal    Musculoskeletal: Normal range of motion  Skin: Skin is warm  She is not diaphoretic     Erythema and swelling involving LLE up to mid-shin with spotty patches involving L thigh concerning for superficial cellulitis, malar rash appears to spare nasolabial folds of face, warm to touch     PAST MEDICAL HISTORY     Past Medical History:   Diagnosis Date    Anxiety     Depression     Disease of thyroid gland     HYPO    Femur neck fracture (HCC)     GERD (gastroesophageal reflux disease)     Hyperthyroidism     RESOLVED: [id number]    Osteoporosis     Polio     [facility]per[facility]     Right BBB/left ant fasc block     UTI (urinary tract infection)     Varicella infection     LAST ASSESSED:      PAST SURGICAL HISTORY     Past Surgical History:   Procedure Laterality Date    APPENDECTOMY      HIP ARTHROPLASTY Left 2017    Procedure: REMOVAL IM NAIL CONVERSION TO TOTAL HIP ARTHROPLASTY POSTERIOR APPROACH;  Surgeon: Micheal Rey MD;  Location: [facility];  Service: Orthopedics    HIP FRACTURE SURGERY      HIP SURGERY      both hips replaced;2013 left ,2014 right    JOINT REPLACEMENT Right     HIP TOTAL    NC CONV PREV HIP SURG TO TOT HIP ARTHROPLAS Left 10/4/2017    Procedure: Hafsa Escobar removal of left hip;  Surgeon: Micheal Rey MD;  Location: [facility];  Service: Orthopedics    REVISION TOTAL HIP ARTHROPLASTY Right     TONSILLECTOMY       SOCIAL & FAMILY HISTORY     Social History     Substance and Sexual Activity   Alcohol Use No       Social History     Substance and Sexual Activity   Drug Use No     Social History     Tobacco Use   Smoking Status Former Smoker   Smokeless Tobacco Never Used   Tobacco Comment    quit 20-30 years ago; NEVER A SMOKER AS PER ALL SCRIPTS      Family History   Problem Relation Age of Onset    Rheum arthritis Mother     Rheum arthritis Sister     Prostate cancer Brother      LABORATORY DATA     Labs: I have personally reviewed pertinent reports  Results from last 7 days   Lab Units 11/30/19  1338   WBC Thousand/uL 18 10*   HEMOGLOBIN g/dL 12 0   HEMATOCRIT % 38 3   PLATELETS Thousands/uL 261   NEUTROS PCT % 90*   MONOS PCT % 6      Results from last 7 days   Lab Units 11/30/19  1338   POTASSIUM mmol/L 4 8   CHLORIDE mmol/L 104   CO2 mmol/L 28   BUN mg/dL 15   CREATININE mg/dL 0 71   CALCIUM mg/dL 9 3   ALK PHOS U/L 87   ALT U/L 32   AST U/L 53*                      Results from last 7 days   Lab Units 11/30/19  1338   TROPONIN I ng/mL <0 02     Micro:  Lab Results   Component Value Date    URINECX >100,000 cfu/ml Proteus mirabilis 08/30/2017     IMAGING & DIAGNOSTIC TESTS     Imaging: I have personally reviewed pertinent reports  No results found  EKG, Pathology, and Other Studies: I have personally reviewed pertinent reports       ALLERGIES   No Known Allergies  MEDICATIONS PRIOR TO ARRIVAL     Prior to Admission medications    Medication Sig Start Date End Date Taking?  Authorizing Provider   acetaminophen (TYLENOL) 325 mg tablet Take prn for mild pain 11/21/18  Yes Cordell Gallo PA-C   Ascorbic Acid (VITAMIN C) 1000 MG tablet Take 1,000 mg by mouth daily   Yes Historical Provider, MD   cholecalciferol (VITAMIN D3) 1,000 units tablet Take 1,000 Units by mouth daily 12/13/16  Yes Historical Provider, MD   docusate sodium (COLACE) 100 mg capsule Take 1 capsule by mouth 2 (two) times a day 11/27/17  Yes Elsa Cedeno MD   famotidine (PEPCID) 20 mg tablet Take 1 tablet (20 mg total) by mouth 2 (two) times a day as needed for heartburn 11/12/19 5/10/20 Yes Dipti Nguyen PA-C   folic acid (FOLVITE) 1 mg tablet TAKE ONE TABLET BY MOUTH EVERY DAY 10/1/18  Yes Liliana Celestin MD   levothyroxine 25 mcg tablet Take 1 tablet (25 mcg total) by mouth daily 11/4/19  Yes Dipti Nguyen PA-C   methotrexate 2 5 MG tablet Take by mouth 4 tablets by mouth once a week on monday   Yes Historical Provider, MD   Multiple Vitamins-Minerals (CENTRUM SILVER PO) Take 1 tablet by mouth daily 12/13/16  Yes Historical Provider, MD   nortriptyline (PAMELOR) 50 mg capsule Take 1 capsule (50 mg total) by mouth daily at bedtime 10/21/19 1/19/20 Yes Dipti Nguyen PA-C   predniSONE 1 mg tablet  7/5/19  Yes Historical Provider, MD   Psyllium (METAMUCIL FIBER PO) Take 1 packet by mouth 3 (three) times a day     Yes Historical Provider, MD   Vitamin E 400 units TABS Take 400 Units by mouth daily   12/13/16  Yes Historical Provider, MD   gabapentin (NEURONTIN) 300 mg capsule Take 1 capsule (300 mg total) by mouth 3 (three) times a day 11/12/19 5/10/20  Dipti Nguyen PA-C     MEDICATIONS ADMINISTERED IN LAST 24 HOURS     Medication Administration - last 24 hours from 11/29/2019 1654 to 11/30/2019 1654       Date/Time Order Dose Route Action Action by     11/30/2019 1326 oxyCODONE (ROXICODONE) IR tablet 2 5 mg 2 5 mg Oral Given Shana Mcneil RN     11/30/2019 7756 multi-electrolyte (ISOLYTE-S PH 7 4) bolus 500 mL 0 mL Intravenous Stopped Yuan Mireles RN     11/30/2019 1346 multi-electrolyte (ISOLYTE-S PH 7 4) bolus 500 mL 500 mL Intravenous [address] Yuan Mireles RN     11/30/2019 1543 ceftriaxone (ROCEPHIN) 1 g/50 mL in dextrose IVPB 0 mg Intravenous Stopped Yuan Mireles RN     11/30/2019 1436 ceftriaxone (ROCEPHIN) 1 g/50 mL in dextrose IVPB 1,000 mg Intravenous New Bag Yuan Mireles RN        CURRENT MEDICATIONS       No current facility-administered medications for this encounter  Admission Time  I spent 30 minutes admitting the patient  This involved direct patient contact where I performed a full history and physical, reviewing previous records, and reviewing laboratory and other diagnostic studies  Portions of the record may have been created with voice recognition software  Occasional wrong word or "sound a like" substitutions may have occurred due to the inherent limitations of voice recognition software    Read the chart carefully and recognize, using context, where substitutions have occurred     ==  Melba Schuster, DO  [address]  Internal Medicine Residency PGY-1

## 2019-11-30 NOTE — ED ATTENDING ATTESTATION
11/30/2019  I, Bartolome Garvin MD, saw and evaluated the patient  I have discussed the patient with the resident/non-physician practitioner and agree with the resident's/non-physician practitioner's findings, Plan of Care, and MDM as documented in the resident's/non-physician practitioner's note, except where noted  All available labs and Radiology studies were reviewed  I was present for key portions of any procedure(s) performed by the resident/non-physician practitioner and I was immediately available to provide assistance  At this point I agree with the current assessment done in the Emergency Department    I have conducted an independent evaluation of this patient a history and physical is as follows:  H/o RA   Prior thyroiditis episode  Facial redness  Since Wednesday   Left lower leg swelling and redness   No fever    No sob no cough no cp   Noted a rash on left lower leg   No abd pain      Also occ feels light headed   Exam   Right cheek swelling no redness  Throat clear no sublingual swelling  perrl eomi  No proptosis lungs clear  Heart rrr no m abd soft nt nd ext  Left l redness over the left lower ext  maculo papular     No calf tendernes    Imp    ED Course         Critical Care Time  Procedures

## 2019-11-30 NOTE — PROGRESS NOTES
Vancomycin Assessment    Soha Rivear is a 75 y o  female who is currently receiving vancomycin 750mg q12h for skin-soft tissue infection     Relevant clinical data and objective history reviewed:  Creatinine   Date Value Ref Range Status   11/30/2019 0 71 0 60 - 1 30 mg/dL Final     Comment:     Standardized to IDMS reference method   07/05/2019 0 60 0 60 - 1 30 mg/dL Final     Comment:     Standardized to IDMS reference method   06/04/2019 0 57 (L) 0 60 - 1 30 mg/dL Final     Comment:     Standardized to IDMS reference method   10/30/2014 0 55 (L) 0 60 - 1 30 mg/dL Final     Comment:     Standardized to IDMS reference method   10/27/2014 0 60 0 60 - 1 30 mg/dL Final     Comment:     Standardized to IDMS reference method   10/23/2014 0 52 (L) 0 60 - 1 30 mg/dL Final     Comment:     Standardized to IDMS reference method     /82 (BP Location: Left arm)   Pulse 96   Temp 98 2 °F (36 8 °C) (Oral)   Resp 17   Ht 5' (1 524 m)   Wt 45 4 kg (100 lb)   SpO2 97%   BMI 19 53 kg/m²   No intake/output data recorded  Lab Results   Component Value Date/Time    BUN 15 11/30/2019 01:38 PM    BUN 16 10/30/2014 06:26 AM    WBC 18 10 (H) 11/30/2019 01:38 PM    WBC 5 60 12/16/2014 07:27 AM    HGB 12 0 11/30/2019 01:38 PM    HGB 11 9 12/16/2014 07:27 AM    HCT 38 3 11/30/2019 01:38 PM    HCT 36 8 12/16/2014 07:27 AM    MCV 94 11/30/2019 01:38 PM    MCV 84 12/16/2014 07:27 AM     11/30/2019 01:38 PM     12/16/2014 07:27 AM     Temp Readings from Last 3 Encounters:   11/30/19 98 2 °F (36 8 °C) (Oral)   11/12/19 98 1 °F (36 7 °C) (Oral)   07/24/19 97 8 °F (36 6 °C) (Oral)     Vancomycin Days of Therapy: 1    Assessment/Plan  The patient is currently on vancomycin utilizing scheduled dosing based on actual body weight  Baseline risks associated with therapy include: The patient is currently receiving 750mg q12h and after clinical evaluation will be changed to 500mg q12h    Pharmacy will also follow closely for s/sx of nephrotoxicity, infusion reactions and appropriateness of therapy  BMP and CBC will be ordered per protocol  Plan for trough as patient approaches steady state, prior to the 4th  dose at approximately 5:30 on 12/2/19  Due to infection severity, will target a trough of     Pharmacy will continue to follow the patients culture results and clinical progress daily      Brittany Calix, Pharmacist

## 2019-12-01 ENCOUNTER — APPOINTMENT (OUTPATIENT)
Dept: RADIOLOGY | Facility: HOSPITAL | Age: 74
DRG: 872 | End: 2019-12-01
Payer: MEDICARE

## 2019-12-01 LAB
ALBUMIN SERPL BCP-MCNC: 2.6 G/DL (ref 3.5–5)
ALP SERPL-CCNC: 97 U/L (ref 46–116)
ALT SERPL W P-5'-P-CCNC: 23 U/L (ref 12–78)
ANION GAP SERPL CALCULATED.3IONS-SCNC: 4 MMOL/L (ref 4–13)
ANION GAP SERPL CALCULATED.3IONS-SCNC: 7 MMOL/L (ref 4–13)
AST SERPL W P-5'-P-CCNC: 24 U/L (ref 5–45)
ATRIAL RATE: 89 BPM
ATRIAL RATE: 95 BPM
BASOPHILS # BLD AUTO: 0.01 THOUSANDS/ΜL (ref 0–0.1)
BASOPHILS NFR BLD AUTO: 0 % (ref 0–1)
BILIRUB SERPL-MCNC: 0.32 MG/DL (ref 0.2–1)
BUN SERPL-MCNC: 13 MG/DL (ref 5–25)
BUN SERPL-MCNC: 16 MG/DL (ref 5–25)
CALCIUM SERPL-MCNC: 8 MG/DL (ref 8.3–10.1)
CALCIUM SERPL-MCNC: 8.2 MG/DL (ref 8.3–10.1)
CHLORIDE SERPL-SCNC: 104 MMOL/L (ref 100–108)
CHLORIDE SERPL-SCNC: 105 MMOL/L (ref 100–108)
CK MB SERPL-MCNC: 1.1 % (ref 0–2.5)
CK MB SERPL-MCNC: 1.4 NG/ML (ref 0–5)
CK SERPL-CCNC: 133 U/L (ref 26–192)
CO2 SERPL-SCNC: 25 MMOL/L (ref 21–32)
CO2 SERPL-SCNC: 27 MMOL/L (ref 21–32)
CREAT SERPL-MCNC: 0.5 MG/DL (ref 0.6–1.3)
CREAT SERPL-MCNC: 0.64 MG/DL (ref 0.6–1.3)
CRP SERPL QL: >90 MG/L
EOSINOPHIL # BLD AUTO: 0 THOUSAND/ΜL (ref 0–0.61)
EOSINOPHIL NFR BLD AUTO: 0 % (ref 0–6)
ERYTHROCYTE [DISTWIDTH] IN BLOOD BY AUTOMATED COUNT: 14.9 % (ref 11.6–15.1)
GFR SERPL CREATININE-BSD FRML MDRD: 88 ML/MIN/1.73SQ M
GFR SERPL CREATININE-BSD FRML MDRD: 95 ML/MIN/1.73SQ M
GLUCOSE SERPL-MCNC: 101 MG/DL (ref 65–140)
GLUCOSE SERPL-MCNC: 109 MG/DL (ref 65–140)
HCT VFR BLD AUTO: 30.9 % (ref 34.8–46.1)
HGB BLD-MCNC: 9.8 G/DL (ref 11.5–15.4)
IMM GRANULOCYTES # BLD AUTO: 0.1 THOUSAND/UL (ref 0–0.2)
IMM GRANULOCYTES NFR BLD AUTO: 1 % (ref 0–2)
LACTATE SERPL-SCNC: 1.9 MMOL/L (ref 0.5–2)
LYMPHOCYTES # BLD AUTO: 0.75 THOUSANDS/ΜL (ref 0.6–4.47)
LYMPHOCYTES NFR BLD AUTO: 6 % (ref 14–44)
MCH RBC QN AUTO: 29.8 PG (ref 26.8–34.3)
MCHC RBC AUTO-ENTMCNC: 31.7 G/DL (ref 31.4–37.4)
MCV RBC AUTO: 94 FL (ref 82–98)
MONOCYTES # BLD AUTO: 1.13 THOUSAND/ΜL (ref 0.17–1.22)
MONOCYTES NFR BLD AUTO: 8 % (ref 4–12)
NEUTROPHILS # BLD AUTO: 11.69 THOUSANDS/ΜL (ref 1.85–7.62)
NEUTS SEG NFR BLD AUTO: 85 % (ref 43–75)
NRBC BLD AUTO-RTO: 0 /100 WBCS
P AXIS: 63 DEGREES
P AXIS: 75 DEGREES
PLATELET # BLD AUTO: 202 THOUSANDS/UL (ref 149–390)
PMV BLD AUTO: 8.9 FL (ref 8.9–12.7)
POTASSIUM SERPL-SCNC: 3.6 MMOL/L (ref 3.5–5.3)
POTASSIUM SERPL-SCNC: 3.6 MMOL/L (ref 3.5–5.3)
PR INTERVAL: 112 MS
PR INTERVAL: 118 MS
PROT SERPL-MCNC: 6.3 G/DL (ref 6.4–8.2)
QRS AXIS: 100 DEGREES
QRS AXIS: 85 DEGREES
QRSD INTERVAL: 114 MS
QRSD INTERVAL: 124 MS
QT INTERVAL: 336 MS
QT INTERVAL: 390 MS
QTC INTERVAL: 422 MS
QTC INTERVAL: 474 MS
RBC # BLD AUTO: 3.29 MILLION/UL (ref 3.81–5.12)
SODIUM SERPL-SCNC: 136 MMOL/L (ref 136–145)
SODIUM SERPL-SCNC: 136 MMOL/L (ref 136–145)
T WAVE AXIS: 70 DEGREES
T WAVE AXIS: 75 DEGREES
VENTRICULAR RATE: 89 BPM
VENTRICULAR RATE: 95 BPM
WBC # BLD AUTO: 13.68 THOUSAND/UL (ref 4.31–10.16)

## 2019-12-01 PROCEDURE — 86235 NUCLEAR ANTIGEN ANTIBODY: CPT | Performed by: INTERNAL MEDICINE

## 2019-12-01 PROCEDURE — 80048 BASIC METABOLIC PNL TOTAL CA: CPT | Performed by: STUDENT IN AN ORGANIZED HEALTH CARE EDUCATION/TRAINING PROGRAM

## 2019-12-01 PROCEDURE — 93005 ELECTROCARDIOGRAM TRACING: CPT

## 2019-12-01 PROCEDURE — 86140 C-REACTIVE PROTEIN: CPT | Performed by: INTERNAL MEDICINE

## 2019-12-01 PROCEDURE — 93010 ELECTROCARDIOGRAM REPORT: CPT | Performed by: INTERNAL MEDICINE

## 2019-12-01 PROCEDURE — 80053 COMPREHEN METABOLIC PANEL: CPT | Performed by: INTERNAL MEDICINE

## 2019-12-01 PROCEDURE — 82550 ASSAY OF CK (CPK): CPT | Performed by: INTERNAL MEDICINE

## 2019-12-01 PROCEDURE — 82553 CREATINE MB FRACTION: CPT | Performed by: INTERNAL MEDICINE

## 2019-12-01 PROCEDURE — 99223 1ST HOSP IP/OBS HIGH 75: CPT | Performed by: INTERNAL MEDICINE

## 2019-12-01 PROCEDURE — 83605 ASSAY OF LACTIC ACID: CPT | Performed by: INTERNAL MEDICINE

## 2019-12-01 PROCEDURE — 73701 CT LOWER EXTREMITY W/DYE: CPT

## 2019-12-01 PROCEDURE — 85025 COMPLETE CBC W/AUTO DIFF WBC: CPT | Performed by: STUDENT IN AN ORGANIZED HEALTH CARE EDUCATION/TRAINING PROGRAM

## 2019-12-01 RX ORDER — OXYCODONE HYDROCHLORIDE 5 MG/1
5 TABLET ORAL EVERY 4 HOURS PRN
Status: DISCONTINUED | OUTPATIENT
Start: 2019-12-01 | End: 2019-12-07 | Stop reason: HOSPADM

## 2019-12-01 RX ORDER — PREDNISONE 1 MG/1
2 TABLET ORAL ONCE
Status: COMPLETED | OUTPATIENT
Start: 2019-12-01 | End: 2019-12-01

## 2019-12-01 RX ORDER — ACETAMINOPHEN 325 MG/1
650 TABLET ORAL EVERY 6 HOURS PRN
Status: DISCONTINUED | OUTPATIENT
Start: 2019-12-01 | End: 2019-12-07 | Stop reason: HOSPADM

## 2019-12-01 RX ORDER — OXYCODONE HYDROCHLORIDE 5 MG/1
2.5 TABLET ORAL EVERY 4 HOURS PRN
Status: DISCONTINUED | OUTPATIENT
Start: 2019-12-01 | End: 2019-12-07 | Stop reason: HOSPADM

## 2019-12-01 RX ORDER — PREDNISONE 1 MG/1
3 TABLET ORAL DAILY
Status: DISCONTINUED | OUTPATIENT
Start: 2019-12-02 | End: 2019-12-07 | Stop reason: HOSPADM

## 2019-12-01 RX ORDER — SODIUM CHLORIDE 9 MG/ML
100 INJECTION, SOLUTION INTRAVENOUS CONTINUOUS
Status: DISPENSED | OUTPATIENT
Start: 2019-12-01 | End: 2019-12-01

## 2019-12-01 RX ADMIN — GABAPENTIN 300 MG: 300 CAPSULE ORAL at 08:12

## 2019-12-01 RX ADMIN — SODIUM CHLORIDE 100 ML/HR: 0.9 INJECTION, SOLUTION INTRAVENOUS at 10:04

## 2019-12-01 RX ADMIN — DOCUSATE SODIUM 100 MG: 100 CAPSULE, LIQUID FILLED ORAL at 08:12

## 2019-12-01 RX ADMIN — CEFAZOLIN SODIUM 2000 MG: 2 SOLUTION INTRAVENOUS at 02:29

## 2019-12-01 RX ADMIN — IOHEXOL 75 ML: 350 INJECTION, SOLUTION INTRAVENOUS at 15:33

## 2019-12-01 RX ADMIN — VANCOMYCIN HYDROCHLORIDE 500 MG: 500 INJECTION, SOLUTION INTRAVENOUS at 05:54

## 2019-12-01 RX ADMIN — LEVOTHYROXINE SODIUM 25 MCG: 25 TABLET ORAL at 05:54

## 2019-12-01 RX ADMIN — NORTRIPTYLINE HYDROCHLORIDE 50 MG: 50 CAPSULE ORAL at 21:52

## 2019-12-01 RX ADMIN — GABAPENTIN 300 MG: 300 CAPSULE ORAL at 20:03

## 2019-12-01 RX ADMIN — GABAPENTIN 300 MG: 300 CAPSULE ORAL at 17:06

## 2019-12-01 RX ADMIN — CEFAZOLIN SODIUM 2000 MG: 2 SOLUTION INTRAVENOUS at 09:55

## 2019-12-01 RX ADMIN — FOLIC ACID 1000 MCG: 1 TABLET ORAL at 08:12

## 2019-12-01 RX ADMIN — DOCUSATE SODIUM 100 MG: 100 CAPSULE, LIQUID FILLED ORAL at 17:04

## 2019-12-01 RX ADMIN — OXYCODONE HYDROCHLORIDE 2.5 MG: 10 TABLET ORAL at 17:05

## 2019-12-01 RX ADMIN — PSYLLIUM HUSK 1 PACKET: 3.4 POWDER ORAL at 20:03

## 2019-12-01 RX ADMIN — ACETAMINOPHEN 325 MG: 325 TABLET ORAL at 02:42

## 2019-12-01 RX ADMIN — ACETAMINOPHEN 650 MG: 325 TABLET ORAL at 08:11

## 2019-12-01 RX ADMIN — SODIUM CHLORIDE, SODIUM GLUCONATE, SODIUM ACETATE, POTASSIUM CHLORIDE AND MAGNESIUM CHLORIDE 75 ML/HR: 526; 502; 368; 37; 30 INJECTION, SOLUTION INTRAVENOUS at 20:01

## 2019-12-01 RX ADMIN — CEFAZOLIN SODIUM 2000 MG: 2 SOLUTION INTRAVENOUS at 17:37

## 2019-12-01 RX ADMIN — ENOXAPARIN SODIUM 40 MG: 40 INJECTION SUBCUTANEOUS at 08:12

## 2019-12-01 RX ADMIN — OXYCODONE HYDROCHLORIDE 5 MG: 5 TABLET ORAL at 13:50

## 2019-12-01 RX ADMIN — PREDNISONE 1 MG: 1 TABLET ORAL at 08:12

## 2019-12-01 RX ADMIN — PREDNISONE 2 MG: 1 TABLET ORAL at 17:04

## 2019-12-01 RX ADMIN — OXYCODONE HYDROCHLORIDE 2.5 MG: 10 TABLET ORAL at 09:55

## 2019-12-01 RX ADMIN — PSYLLIUM HUSK 1 PACKET: 3.4 POWDER ORAL at 08:11

## 2019-12-01 NOTE — SOCIAL WORK
CM met with patient to discuss CM role in DCP OBS notice explained and signed  Pt lives with sister in 2 story house with 3 VILMA and 12 steps to bedroom w/ BR on first floor  Independent ADL's and ambulation pta  Pharmacy is [facility] on [address]-has copay  PCP:Dr Kenji Montaño PA-C  DME: walker and cane from 2 prior hip replacements  Denies h/o mental health and alcohol/drug treatment  She is known to [facility] and would like to use their services again if indicated upon d/c  No h/o inpt rehab  Brother will transport home  CM reviewed d/c planning process including the following: identifying help at home, patient preference for d/c planning needs, Discharge Lounge, [facility] Meds to Bed program, availability of treatment team to discuss questions or concerns patient and/or family may have regarding understanding medications and recognizing signs and symptoms once discharged  CM also encouraged patient to follow up with all recommended appointments after discharge  Patient advised of importance for patient and family to participate in managing patients medical well being  Patient/caregiver received discharge checklist   Content reviewed  Patient/caregiver encouraged to participate in discharge plan of care prior to discharge home    Await I&D consult/input

## 2019-12-01 NOTE — UTILIZATION REVIEW
Initial Clinical Review    Admission: Date/Time/Statement:  11/30/19 @ 1612 -- OBS -- UPGRADED TO INPATIENT 12/1 @ [place] SEPSIS    12/01/19 1904  Inpatient Admission Once     Transfer Service: General Medicine       Question Answer Comment   Admitting Physician Leo Keller    Level of Care Med Surg    Estimated length of stay More than 2 Midnights    Certification I certify that inpatient services are medically necessary for this patient for a duration of greater than two midnights  See H&P and MD Progress Notes for additional information about the patient's course of treatment  12/01/19 1904     ED Arrival Information     Expected Arrival Acuity Means of Arrival Escorted By Service Admission Type    - 11/30/2019 12:39 Urgent Walk-In Self General Medicine Urgent    Arrival Complaint    facial, left leg swelling        Chief Complaint   Patient presents with    Medical Problem     Pt c/o left leg redness and swelling and facial redness and swelling since Wednesday with dizzyness  Assessment/Plan: 78-year-old female with PMHx of hypothyroidism, rheumatoid arthritis on prednisone and methotrexate, vitamin-D deficiency, age-related osteoporosis presented to the ED with left lower extremity swelling, erythema, and pain noted as stabbing in quality that is intermittent in duration  According to the patient, she had a "corn" on the bottom of her left foot that she was shaving down at home which proceeded to become painful and red  The rash started to spread superiorly to the mid shin as well as sporadic patches throughout her left upper thigh  She also is complaining of chills associated with the infection  Moreover, a malar-like rash was noted on her face  She mentioned that she had the rash previously and was treated with antibiotics in the emergency department  Given her history for multiple autoimmune diseases further lab work was ordered    Given the presentation of her left lower extremity swelling and erythema she was admitted for observation and workup for presumed cellulitis  In addition, her EKG was significant for changes involving new T-wave inversions in V3 and V4 and possible ST depressions in the inferior leads  Additional troponin and follow-up EKG was ordered  1  Sepsis - patient presented with leukocytosis and tachycardia with suspicion for infection of lower extremity given pain, swelling and erythema likely secondary to strep cellulitis  Unlikely MRSA however will cover considering patient meets sepsis criteria  No evidence of purulence, or crepitus  Patient received 1 g IV ceftriaxone in the ED  Currently afebrile, complaining of chills and continued pain  - x-ray ankle, foot, tib-fib of left extremity  - lactic acid, CBC, BMP  - blood culture x2  - vancomycin / cefazolin  - IV fluids at 75  - consult pharmacology for vanc trough  - MRSA culture  - wound care  - oxycodone for       12/1 -Disposition: will consult ID for worsening cellulitis on cefazolin and vancomycin         ED Triage Vitals   Temperature Pulse Respirations Blood Pressure SpO2   11/30/19 1257 11/30/19 1257 11/30/19 1257 11/30/19 1257 11/30/19 1400   98 2 °F (36 8 °C) 102 17 141/77 99 %      Temp Source Heart Rate Source Patient Position - Orthostatic VS BP Location FiO2 (%)   11/30/19 1257 11/30/19 1257 11/30/19 1257 11/30/19 1257 --   Oral Monitor Lying Right arm       Pain Score       11/30/19 1257       5        Wt Readings from Last 1 Encounters:   11/30/19 45 4 kg (100 lb)     Additional Vital Signs:   Date/Time  Temp  Pulse  Resp  BP  MAP (mmHg)  SpO2  O2 Device   12/01/19 07:18:28  98 3 °F (36 8 °C)  87  17  93/55  68  99 %     11/30/19 22:00:23    100  16  137/79  98  98 %     11/30/19 1717    98    138/78    97 %     11/30/19 1556    96  17  155/82    97 %  None (Room air)   11/30/19 1530    96  17  158/78    100 %  None (Room air)   11/30/19 1513    97  18  131/60    100 % None (Room air)   11/30/19 1445    99  17  151/76    98 %  None (Room air)   11/30/19 1400    94  18  153/70    99 %  None (Room air)   11/30/19 1257  98 2 °F (36 8 °C)  102  17  141/77      None (Room air)       Pertinent Labs/Diagnostic Test Results:   Results from last 7 days   Lab Units 12/01/19  0511 11/30/19  1338   WBC Thousand/uL 13 68* 18 10*   HEMOGLOBIN g/dL 9 8* 12 0   HEMATOCRIT % 30 9* 38 3   PLATELETS Thousands/uL 202 261   NEUTROS ABS Thousands/µL 11 69* 16 34*     Results from last 7 days   Lab Units 12/01/19  0511 11/30/19  1338   SODIUM mmol/L 136 137   POTASSIUM mmol/L 3 6 4 8   CHLORIDE mmol/L 104 104   CO2 mmol/L 25 28   ANION GAP mmol/L 7 5   BUN mg/dL 16 15   CREATININE mg/dL 0 50* 0 71   EGFR ml/min/1 73sq m 95 84   CALCIUM mg/dL 8 2* 9 3     Results from last 7 days   Lab Units 11/30/19  1338   AST U/L 53*   ALT U/L 32   ALK PHOS U/L 87   TOTAL PROTEIN g/dL 7 6   ALBUMIN g/dL 3 8   TOTAL BILIRUBIN mg/dL 0 65     Results from last 7 days   Lab Units 12/01/19  0511 11/30/19  1338   GLUCOSE RANDOM mg/dL 109 107     Results from last 7 days   Lab Units 11/30/19  1746 11/30/19  1338   TROPONIN I ng/mL <0 02  <0 02 <0 02     Results from last 7 days   Lab Units 11/30/19  1746   TSH 3RD GENERATON uIU/mL 1 290     Results from last 7 days   Lab Units 11/30/19  1744   LACTIC ACID mmol/L 1 7     Results from last 7 days   Lab Units 11/30/19  1428 11/30/19  1427   CLARITY UA   --  Clear   COLOR UA  see chart Yellow   SPEC GRAV UA   --  1 010   PH UA   --  >=9 0*   GLUCOSE UA mg/dl  --  Negative   KETONES UA mg/dl  --  Trace*   BLOOD UA   --  Moderate*   PROTEIN UA mg/dl  --  30 (1+)*   NITRITE UA   --  Positive*   BILIRUBIN UA   --  Negative   UROBILINOGEN UA E U /dl  --  0 2   LEUKOCYTES UA   --  Moderate*   WBC UA /hpf  --  30-50*   RBC UA /hpf  --  4-10*   BACTERIA UA /hpf  --  Innumerable*   EPITHELIAL CELLS WET PREP /hpf  --  Occasional     Results from last 7 days   Lab Units 11/30/19  1825 11/30/19  1747   BLOOD CULTURE  Received in Microbiology Lab  Culture in Progress  Received in Microbiology Lab  Culture in Progress       ED Treatment:   Medication Administration from 11/30/2019 1239 to 11/30/2019 1703       Date/Time Order Dose Route Action     11/30/2019 1326 oxyCODONE (ROXICODONE) IR tablet 2 5 mg 2 5 mg Oral Given     11/30/2019 1346 multi-electrolyte (ISOLYTE-S PH 7 4) bolus 500 mL 500 mL Intravenous New Bag     11/30/2019 1436 ceftriaxone (ROCEPHIN) 1 g/50 mL in dextrose IVPB 1,000 mg Intravenous New Bag     Past Medical History:   Diagnosis Date    Anxiety     Depression     Disease of thyroid gland     HYPO    Femur neck fracture (HCC)     GERD (gastroesophageal reflux disease)     Hyperthyroidism     RESOLVED: [id number]    Osteoporosis     Polio     PVD (peripheral vascular disease) ([facility] )     Right BBB/left ant fasc block     UTI (urinary tract infection)     Varicella infection     LAST ASSESSED: [id number]     Present on Admission:   Peripheral vascular disease ([facility] )   Hypothyroidism   RBBB   Rheumatoid arthritis involving multiple sites with positive rheumatoid factor (HCC)      Admitting Diagnosis: Patch of erysipelas [A46]  UTI (urinary tract infection) [N39 0]  EKG abnormalities [R94 31]  Facial swelling [R22 0]  Positional lightheadedness [R42]  Cellulitis of left lower extremity [L03 116]  Erysipelas of left lower extremity [A46]  Age/Sex: 75 y o  female  Admission Orders:  Scheduled Medications:  Medications:  cefazolin 2,000 mg Intravenous Q8H   docusate sodium 100 mg Oral BID   enoxaparin 40 mg Subcutaneous Daily   folic acid 8,716 mcg Oral Daily   gabapentin 300 mg Oral TID   levothyroxine 25 mcg Oral Early Morning   [START ON 12/2/2019] methotrexate 10 mg Oral Weekly   nortriptyline 50 mg Oral HS   predniSONE 1 mg Oral Daily   psyllium 1 packet Oral TID   vancomycin 500 mg Intravenous Q12H     Continuous IV Infusions:  multi-electrolyte 75 mL/hr Intravenous Continuous   sodium chloride 100 mL/hr Intravenous Continuous     PRN Meds:  acetaminophen 650 mg Oral Q6H PRN   famotidine 20 mg Oral BID PRN   oxyCODONE 2 5 mg Oral Q4H PRN   oxyCODONE 5 mg Oral Q4H PRN       IP CONSULT TO CASE MANAGEMENT  IP CONSULT TO WOUND CARE  IP CONSULT TO PHARMACY    Network Utilization Review Department  [email or web address]  ATTENTION: Please call with any questions or concerns to [phone] and carefully listen to the prompts so that you are directed to the right person  All voicemails are confidential   Vianney Gardiner all requests for admission clinical reviews, approved or denied determinations and any other requests to dedicated fax number below belonging to the campus where the patient is receiving treatment    FACILITY NAME UR FAX NUMBER   ADMISSION DENIALS (Administrative/Medical Necessity) [address] [facility] (Maternity/NICU/Pediatrics) [phone]   [facility] [address] [facility] [phone]   David Rayo [facility] [address] [facility] [phone]   Marycarmen Sol [address] [facility] [address] [phone]

## 2019-12-01 NOTE — PROGRESS NOTES
INTERNAL MEDICINE RESIDENCY PROGRESS NOTE     Name: Osmel Johns   Age & Sex: 75 y o  female   MRN: [id number]  Unit/Bed#: [facility] 723-01   Encounter: [id number]  Team: SOD Team B     PATIENT INFORMATION     Name: Osmel oJhns   Age & Sex: 75 y o  female   MRN: [id number]  Hospital Stay Days: 0    ASSESSMENT/PLAN     Principal Problem:    Sepsis ([facility] [address] )  Active Problems:    Peripheral vascular disease ([facility] [address] )    Hypothyroidism    RBBB    Rheumatoid arthritis involving multiple sites with positive rheumatoid factor (HCC)    Malar rash    Cellulitis of left lower extremity    Abnormal EKG    Pyuria      1  Sepsis - POA  -likely secondary to cellulitis  patient presented with leukocytosis and tachycardia with suspicion for infection of lower extremity given pain, swelling and erythema likely secondary to strep cellulitis  Unlikely MRSA however will cover considering patient meets sepsis criteria  No evidence of purulence, or crepitus  Patient received 1 g IV ceftriaxone in the ED  Currently afebrile, complaining of chills and continued pain  - x-ray ankle, foot, tib-fib of left extremity  - blood culture x2 are pending    - vancomycin / cefazolin started last night   - IV fluids at 100cc/hour   - consult pharmacology for vanc trough  - MRSA culture pending: if negative will discontinue vancomycin   - oxycodone for pain 2 5mg for severe pain and oxycodone 5mg for breakthrough pain    -On exam this morning the patient seems to be in severe pain out of proportion to her exam  No crepitus noted on exam  Motor function in left foot and toes intact, pulses intact  -LRINEC score for nec fasc is 0, low risk, but will obtain stat CT left foot and leg         2  Rheumatoid arthritis   - prednisone 3 mg a day  - methotrexate once a week every Monday-10mg  -LISA pending, anti histone pending        3   Abnormal EKG - evidence of inverted T-waves in V3 and V4 with possible ST depressions in the inferior leads, changes from prior studies  - follow-up EKG  - lactate negative  - trend troponin negative x3    -patient currently denying any chest pain           4  Malar rash - rash does appear to spare the nasal labial fold  - follow LISA       5  Hypothyroidism  - continue levothyroxine       Disposition: will consult ID for worsening cellulitis on cefazolin and vancomycin  SUBJECTIVE     Patient seen and examined  No acute events overnight  Patient reports worsening of her cellulitis, worsening pain and extension up the left leg  She denies fever, chills, nightsweats  OBJECTIVE     Vitals:    19 1717 19 2200 19 0718 19 1100   BP: 138/78 137/79 93/55 92/56   BP Location:       Pulse: 98 100 87 80   Resp:  16 17 18   Temp:   98 3 °F (36 8 °C) 98 5 °F (36 9 °C)   TempSrc:       SpO2: 97% 98% 99% 100%   Weight:       Height:          Temperature:   Temp (24hrs), Av 3 °F (36 8 °C), Min:98 2 °F (36 8 °C), Max:98 5 °F (36 9 °C)    Temperature: 98 5 °F (36 9 °C)  Intake & Output:  I/O        0701 -  0700  0701 -  0700  07 -  0700    P  O    120    I V  (mL/kg)  500 (11) 1224 6 (27)    IV Piggyback  50 70    Total Intake(mL/kg)  550 (12 1) 1414 6 (31 2)    Net  +550 +1414 6               Weights:   IBW: 45 5 kg    Body mass index is 19 53 kg/m²  Weight (last 2 days)     Date/Time   Weight    19 1257   45 4 (100)            Physical Exam   Constitutional: She is oriented to person, place, and time  She appears well-developed and well-nourished  No distress  HENT:   Head: Normocephalic and atraumatic  Mouth/Throat: No oropharyngeal exudate  Eyes: Pupils are equal, round, and reactive to light  No scleral icterus  Neck: Neck supple  No JVD present  No tracheal deviation present  No thyromegaly present  Cardiovascular: Normal rate, regular rhythm, normal heart sounds and intact distal pulses  Exam reveals no gallop and no friction rub  No murmur heard    Pulmonary/Chest: Effort normal and breath sounds normal  No stridor  No respiratory distress  She has no wheezes  She has no rales  She exhibits no tenderness  Abdominal: Soft  Bowel sounds are normal  She exhibits no distension and no mass  There is no tenderness  There is no rebound and no guarding  No hernia  Musculoskeletal: She exhibits edema and tenderness  Left lower extremity erythema extending upwards towards the knee, outside of prior demarcated lines, picture uploaded into media  Very tender to palpation, warm to the touch, no skin breaks noted   Lymphadenopathy:     She has no cervical adenopathy  Neurological: She is alert and oriented to person, place, and time  Skin: Skin is warm and dry  No rash noted  She is not diaphoretic  No erythema  No pallor  Nursing note and vitals reviewed  LABORATORY DATA     Labs: I have personally reviewed pertinent reports  Results from last 7 days   Lab Units 12/01/19  0511 11/30/19  1338   WBC Thousand/uL 13 68* 18 10*   HEMOGLOBIN g/dL 9 8* 12 0   HEMATOCRIT % 30 9* 38 3   PLATELETS Thousands/uL 202 261   NEUTROS PCT % 85* 90*   MONOS PCT % 8 6      Results from last 7 days   Lab Units 12/01/19  0511 11/30/19  1338   POTASSIUM mmol/L 3 6 4 8   CHLORIDE mmol/L 104 104   CO2 mmol/L 25 28   BUN mg/dL 16 15   CREATININE mg/dL 0 50* 0 71   CALCIUM mg/dL 8 2* 9 3   ALK PHOS U/L  --  87   ALT U/L  --  32   AST U/L  --  53*                  Results from last 7 days   Lab Units 11/30/19  1744   LACTIC ACID mmol/L 1 7     Results from last 7 days   Lab Units 11/30/19  1746 11/30/19  1338   TROPONIN I ng/mL <0 02  <0 02 <0 02       IMAGING & DIAGNOSTIC TESTING     Radiology Results: I have personally reviewed pertinent reports  No results found  Other Diagnostic Testing: I have personally reviewed pertinent reports      ACTIVE MEDICATIONS     Current Facility-Administered Medications   Medication Dose Route Frequency    acetaminophen (TYLENOL) tablet 650 mg  650 mg Oral Q6H PRN    ceFAZolin (ANCEF) IVPB (premix) 2,000 mg  2,000 mg Intravenous Q8H    docusate sodium (COLACE) capsule 100 mg  100 mg Oral BID    enoxaparin (LOVENOX) subcutaneous injection 40 mg  40 mg Subcutaneous Daily    famotidine (PEPCID) tablet 20 mg  20 mg Oral BID PRN    folic acid (FOLVITE) tablet 1,000 mcg  1,000 mcg Oral Daily    gabapentin (NEURONTIN) capsule 300 mg  300 mg Oral TID    levothyroxine tablet 25 mcg  25 mcg Oral Early Morning    [START ON 12/2/2019] methotrexate tablet 10 mg  10 mg Oral Weekly    multi-electrolyte (PLASMALYTE-A/ISOLYTE-S PH 7 4) IV solution  75 mL/hr Intravenous Continuous    nortriptyline (PAMELOR) capsule 50 mg  50 mg Oral HS    oxyCODONE (ROXICODONE) IR tablet 2 5 mg  2 5 mg Oral Q4H PRN    oxyCODONE (ROXICODONE) IR tablet 5 mg  5 mg Oral Q4H PRN    [START ON 12/2/2019] predniSONE tablet 3 mg  3 mg Oral Daily    psyllium (METAMUCIL) 1 packet  1 packet Oral TID    sodium chloride 0 9 % infusion  100 mL/hr Intravenous Continuous    vancomycin (VANCOCIN) IVPB (premix) 500 mg  500 mg Intravenous Q12H       VTE Pharmacologic Prophylaxis: Enoxaparin (Lovenox)  VTE Mechanical Prophylaxis: sequential compression device    Portions of the record may have been created with voice recognition software  Occasional wrong word or "sound a like" substitutions may have occurred due to the inherent limitations of voice recognition software    Read the chart carefully and recognize, using context, where substitutions have occurred   ==  Ming Infante, [facility]  Internal Medicine Residency PGY-2

## 2019-12-01 NOTE — PROGRESS NOTES
Vancomycin Assessment     Danyelle Rivera is a 75 y o  female who is currently receiving vancomycin 750mg q12h for skin-soft tissue infection  Relevant clinical data and objective history reviewed:  Microbiology Results     Procedure Component Value Units Date/Time   Blood culture [[id number]] Collected: 11/30/19 1825   Specimen: Blood from Arm, Right Updated: 12/01/19 0101    Blood Culture Received in Microbiology Lab  Culture in Progress  MRSA culture [[id number]] Collected: 11/30/19 1825   Specimen: Nares from Nose Updated: 11/30/19 1831   Blood culture [[id number]] Collected: 11/30/19 1747   Specimen: Blood from Arm, Right Updated: 12/01/19 0101    Blood Culture Received in Microbiology Lab  Culture in Progress  Urine culture [[id number]] Collected: 11/30/19 1427   Specimen: Urine, Clean Catch Updated: 11/30/19 1629   Creatinine (168h ago through now)     Date/Time Serum Creatinine Range CrCl   12/01/19 0511 0 5 mg/dLLow   0 60 - 1 30 mg/dL 69 7 mL/min    11/30/19 1338 0 71 mg/dL  0 60 - 1 30 mg/dL 49 1 mL/min   Vancomycin Related Labs   (Last result from the past 168 hours)    12/01 0511   WBC 13  68High        BUN 16       Lymphocytes Absolute 0 75        Vancomycin Days of Therapy: 2     Assessment/Plan  The patient is currently on vancomycin utilizing scheduled dosing based on actual body weight  No baseline risks associated with therapy present  The patient is currently receiving 500 mg IV q12h  Pharmacy will also follow closely for s/sx of nephrotoxicity, infusion reactions and appropriateness of therapy  BMP and CBC will be ordered per protocol  Plan for trough as patient approaches steady state, prior to the 4th  dose at approximately 5:30 on 12/2/19  Due to infection severity, will target a trough of 15-20   Pharmacy will continue to follow the patients culture results and clinical progress daily

## 2019-12-01 NOTE — CONSULTS
Consultation - Infectious Disease   Geetha Rivera [address] y o  female MRN: [id number]  Unit/Bed#: [facility] 723-01 Encounter: [id number]      Inpatient consult to Infectious Diseases  Consult performed by: Toni River MD  Consult ordered by: Rosario Marley DO          IMPRESSION & RECOMMENDATIONS:   Impression:  1  LLE cellulitis in immunosuppressed host  2  RA on methotrexate prednisone    Recommendations:    Discuss with the primary service  There is no evidence of purulent cellulitis and MRSA causing this would therefore be unlikely  She is likely to have delayed response to cefazolin because of her immunosuppression but there still should be gradual improvement  1  Will check ASO titer   2  We will discontinue vancomycin and continue cefazolin 2 g q 8 hours IV  3  Would advise raising left leg and applying warm packs at least Q shift      HISTORY OF PRESENT ILLNESS:    Reason for Consult:  LLE cellulitis  HPI: Richardson Mehta is a [address] o  year old female history of rheumatoid arthritis on methotrexate and prednisone was admitted yesterday with LLE cellulitis  The area became red approximately 24 hours prior to admission after an onset of rigors type chills and fever elevation  Patient has no known breaks in skin  She has a history of LLE weakness since having polio as a child  Review of Systems pertinent findings include LLE chronic weakness, spreading red rash, fever, chills, sweats, RA joint pain  A [id number] point system-based review of systems is otherwise negative      PAST MEDICAL HISTORY:  Past Medical History:   Diagnosis Date    Anxiety     Depression     Disease of thyroid gland     HYPO    Femur neck fracture (HCC)     GERD (gastroesophageal reflux disease)     Hyperthyroidism     RESOLVED: [id number]    Osteoporosis     Polio     [facility]per[facility]     Right BBB/left ant fasc block     UTI (urinary tract infection)     Varicella infection     LAST ASSESSED: [id number] Past Surgical History:   Procedure Laterality Date    APPENDECTOMY      HIP ARTHROPLASTY Left 2017    Procedure: REMOVAL IM NAIL CONVERSION TO TOTAL HIP ARTHROPLASTY POSTERIOR APPROACH;  Surgeon: Estela Puentes MD;  [facility];  Service: Orthopedics    HIP FRACTURE SURGERY      HIP SURGERY      both hips replaced; left , right    JOINT REPLACEMENT Right     HIP TOTAL    [place] CONV PREV HIP SURG TO TOT HIP Darren Clause Left 10/4/2017    Procedure: Zachery Thalia removal of left hip;  Surgeon: Estela Puentes MD;  [facility];  Service: Orthopedics    REVISION TOTAL HIP ARTHROPLASTY Right     TONSILLECTOMY         FAMILY HISTORY:  Non-contributory    SOCIAL HISTORY:  Social History     Social History     Substance and Sexual Activity   Alcohol Use No    Frequency: Never     Social History     Substance and Sexual Activity   Drug Use No     Social History     Tobacco Use   Smoking Status Former Smoker   Smokeless Tobacco Never Used   Tobacco Comment    quit 20-30 years ago; NEVER A SMOKER AS PER ALL SCRIPTS        ALLERGIES:  No Known Allergies    MEDICATIONS:  All current active medications have been reviewed        PHYSICAL EXAM:  Temp:  [98 3 °F (36 8 °C)-98 5 °F (36 9 °C)] 98 5 °F (36 9 °C)  HR:  [] 81  Resp:  [16-18] 18  BP: ()/(55-79) 117/65  SpO2:  [97 %-100 %] 100 %  Temp (24hrs), Av 4 °F (36 9 °C), Min:98 3 °F (36 8 °C), Max:98 5 °F (36 9 °C)  Current: Temperature: 98 5 °F (36 9 °C)    Intake/Output Summary (Last 24 hours) at 2019 1608  Last data filed at 2019 1401  Gross per 24 hour   Intake 1761 25 ml   Output    Net 1761 25 ml       General Appearance:  Appearing well, nontoxic, and in no distress, appears stated age   Head:  Normocephalic, without obvious abnormality, atraumatic   Eyes:  PERRL, conjunctiva pale and sclera anicteric, both eyes   Nose: Nares normal, mucosa normal, no drainage   Throat: Oropharynx moist without lesions; lips, mucosa, and tongue normal; teeth and gums normal   Neck: Supple, symmetrical, trachea midline, no adenopathy, no tenderness/mass/nodules   Back:   Symmetric, no curvature, ROM normal, no CVA tenderness   Lungs:   Clear to auscultation bilaterally, no audible wheezes, rhonchi and rales, respirations unlabored   Chest Wall:  No tenderness or deformity   Heart:  Regular rate and rhythm, S1, S2 normal, no murmur, rub or gallop   Abdomen:   Soft, non-tender, non-distended, positive bowel sounds, no masses, no organomegaly    No CVA tenderness   Extremities: LLE with tender macular erythema that is warm over the lower leg and medial upper thigh   Skin: As above, surgical scars   Lymph nodes: Cervical, supraclavicular, and axillary nodes normal   Neurologic: Mild LLE weakness           Invasive Devices:   Peripheral IV 11/30/19 Right Forearm (Active)   Site Assessment Clean;Dry; Intact 12/1/2019 11:00 AM   Dressing Type Transparent;Securing device 12/1/2019 11:00 AM   Line Status Infusing 12/1/2019 11:00 AM   Dressing Status Clean;Dry; Intact 12/1/2019 11:00 AM   Dressing Change Due 12/04/19 12/1/2019 11:00 AM       Peripheral IV 12/01/19 Right Antecubital (Active)   Site Assessment Clean;Dry; Intact 12/1/2019  2:00 PM   Dressing Type Transparent;Securing device 12/1/2019  2:00 PM   Line Status Blood return noted;Capped;Flushed;Saline locked 12/1/2019  2:00 PM   Dressing Status Clean;Dry; Intact 12/1/2019  2:00 PM       LABS, IMAGING, & OTHER STUDIES:  Lab Results:      I have personally reviewed pertinent labs      Results from last 7 days   Lab Units 12/01/19  0511 11/30/19  1338   WBC Thousand/uL 13 68* 18 10*   HEMOGLOBIN g/dL 9 8* 12 0   PLATELETS Thousands/uL 202 261     Results from last 7 days   Lab Units 12/01/19  1401 12/01/19  0511 11/30/19  1338   SODIUM mmol/L 136 136 137   POTASSIUM mmol/L 3 6 3 6 4 8   CHLORIDE mmol/L 105 104 104   CO2 mmol/L 27 25 28   BUN mg/dL 13 16 15   CREATININE mg/dL 0 64 0 50* 0 71   EGFR ml/min/1 73sq m 88 95 84   CALCIUM mg/dL 8 0* 8 2* 9 3   AST U/L 24  --  53*   ALT U/L 23  --  32   ALK PHOS U/L 97  --  87     Results from last 7 days   Lab Units 11/30/19  1825 11/30/19  1747 11/30/19  1427   BLOOD CULTURE  Received in Microbiology Lab  Culture in Progress  Received in Microbiology Lab  Culture in Progress  --    URINE CULTURE   --   --  >100,000 cfu/ml Proteus mirabilis*       Imaging Studies:   I have personally reviewed pertinent imaging study reports and images in PACS  EKG, Pathology, and Other Studies:   I have personally reviewed pertinent reports

## 2019-12-02 ENCOUNTER — APPOINTMENT (INPATIENT)
Dept: RADIOLOGY | Facility: HOSPITAL | Age: 74
DRG: 872 | End: 2019-12-02
Payer: MEDICARE

## 2019-12-02 ENCOUNTER — APPOINTMENT (INPATIENT)
Dept: NON INVASIVE DIAGNOSTICS | Facility: HOSPITAL | Age: 74
DRG: 872 | End: 2019-12-02
Payer: MEDICARE

## 2019-12-02 PROBLEM — N39.0 URINARY TRACT INFECTION DUE TO PROTEUS: Status: ACTIVE | Noted: 2019-11-30

## 2019-12-02 PROBLEM — I31.9 MYOPERICARDITIS: Status: ACTIVE | Noted: 2019-12-02

## 2019-12-02 PROBLEM — B96.4 URINARY TRACT INFECTION DUE TO PROTEUS: Status: ACTIVE | Noted: 2019-11-30

## 2019-12-02 LAB
ANA HOMOGEN SER QL IF: NORMAL
ANA HOMOGEN TITR SER: NORMAL {TITER}
ANION GAP SERPL CALCULATED.3IONS-SCNC: 7 MMOL/L (ref 4–13)
ASO AB TITR SER LA: NORMAL {TITER}
ATRIAL RATE: 105 BPM
BASOPHILS # BLD AUTO: 0.02 THOUSANDS/ΜL (ref 0–0.1)
BASOPHILS NFR BLD AUTO: 0 % (ref 0–1)
BUN SERPL-MCNC: 9 MG/DL (ref 5–25)
CALCIUM SERPL-MCNC: 7.8 MG/DL (ref 8.3–10.1)
CHLORIDE SERPL-SCNC: 102 MMOL/L (ref 100–108)
CO2 SERPL-SCNC: 22 MMOL/L (ref 21–32)
CREAT SERPL-MCNC: 0.41 MG/DL (ref 0.6–1.3)
EOSINOPHIL # BLD AUTO: 0.03 THOUSAND/ΜL (ref 0–0.61)
EOSINOPHIL NFR BLD AUTO: 0 % (ref 0–6)
ERYTHROCYTE [DISTWIDTH] IN BLOOD BY AUTOMATED COUNT: 14.9 % (ref 11.6–15.1)
GFR SERPL CREATININE-BSD FRML MDRD: 101 ML/MIN/1.73SQ M
GLUCOSE SERPL-MCNC: 109 MG/DL (ref 65–140)
HCT VFR BLD AUTO: 32.6 % (ref 34.8–46.1)
HGB BLD-MCNC: 9.9 G/DL (ref 11.5–15.4)
IMM GRANULOCYTES # BLD AUTO: 0.09 THOUSAND/UL (ref 0–0.2)
IMM GRANULOCYTES NFR BLD AUTO: 1 % (ref 0–2)
LYMPHOCYTES # BLD AUTO: 0.81 THOUSANDS/ΜL (ref 0.6–4.47)
LYMPHOCYTES NFR BLD AUTO: 7 % (ref 14–44)
MCH RBC QN AUTO: 29.2 PG (ref 26.8–34.3)
MCHC RBC AUTO-ENTMCNC: 30.4 G/DL (ref 31.4–37.4)
MCV RBC AUTO: 96 FL (ref 82–98)
MONOCYTES # BLD AUTO: 0.99 THOUSAND/ΜL (ref 0.17–1.22)
MONOCYTES NFR BLD AUTO: 8 % (ref 4–12)
MRSA NOSE QL CULT: NORMAL
NEUTROPHILS # BLD AUTO: 9.86 THOUSANDS/ΜL (ref 1.85–7.62)
NEUTS SEG NFR BLD AUTO: 84 % (ref 43–75)
NRBC BLD AUTO-RTO: 0 /100 WBCS
P AXIS: 74 DEGREES
PLATELET # BLD AUTO: 209 THOUSANDS/UL (ref 149–390)
PMV BLD AUTO: 10 FL (ref 8.9–12.7)
POTASSIUM SERPL-SCNC: 3.6 MMOL/L (ref 3.5–5.3)
PR INTERVAL: 124 MS
QRS AXIS: 66 DEGREES
QRSD INTERVAL: 120 MS
QT INTERVAL: 354 MS
QTC INTERVAL: 467 MS
RBC # BLD AUTO: 3.39 MILLION/UL (ref 3.81–5.12)
RYE IGE QN: POSITIVE
SODIUM SERPL-SCNC: 131 MMOL/L (ref 136–145)
T WAVE AXIS: 59 DEGREES
TROPONIN I SERPL-MCNC: 0.75 NG/ML
TROPONIN I SERPL-MCNC: 0.83 NG/ML
TROPONIN I SERPL-MCNC: 0.88 NG/ML
VANCOMYCIN TROUGH SERPL-MCNC: 1.3 UG/ML (ref 10–20)
VENTRICULAR RATE: 105 BPM
WBC # BLD AUTO: 11.8 THOUSAND/UL (ref 4.31–10.16)

## 2019-12-02 PROCEDURE — 93306 TTE W/DOPPLER COMPLETE: CPT | Performed by: INTERNAL MEDICINE

## 2019-12-02 PROCEDURE — 80048 BASIC METABOLIC PNL TOTAL CA: CPT | Performed by: INTERNAL MEDICINE

## 2019-12-02 PROCEDURE — 0HDNXZZ EXTRACTION OF LEFT FOOT SKIN, EXTERNAL APPROACH: ICD-10-PCS | Performed by: PODIATRIST

## 2019-12-02 PROCEDURE — 93005 ELECTROCARDIOGRAM TRACING: CPT

## 2019-12-02 PROCEDURE — 93010 ELECTROCARDIOGRAM REPORT: CPT | Performed by: INTERNAL MEDICINE

## 2019-12-02 PROCEDURE — 71045 X-RAY EXAM CHEST 1 VIEW: CPT

## 2019-12-02 PROCEDURE — 86063 ANTISTREPTOLYSIN O SCREEN: CPT | Performed by: INTERNAL MEDICINE

## 2019-12-02 PROCEDURE — 99223 1ST HOSP IP/OBS HIGH 75: CPT | Performed by: PODIATRIST

## 2019-12-02 PROCEDURE — 99232 SBSQ HOSP IP/OBS MODERATE 35: CPT | Performed by: INTERNAL MEDICINE

## 2019-12-02 PROCEDURE — 80202 ASSAY OF VANCOMYCIN: CPT | Performed by: OBSTETRICS & GYNECOLOGY

## 2019-12-02 PROCEDURE — 84484 ASSAY OF TROPONIN QUANT: CPT | Performed by: INTERNAL MEDICINE

## 2019-12-02 PROCEDURE — 93306 TTE W/DOPPLER COMPLETE: CPT

## 2019-12-02 PROCEDURE — 85025 COMPLETE CBC W/AUTO DIFF WBC: CPT | Performed by: INTERNAL MEDICINE

## 2019-12-02 PROCEDURE — 87040 BLOOD CULTURE FOR BACTERIA: CPT | Performed by: INTERNAL MEDICINE

## 2019-12-02 RX ORDER — SODIUM CHLORIDE AND POTASSIUM CHLORIDE .9; .15 G/100ML; G/100ML
75 SOLUTION INTRAVENOUS CONTINUOUS
Status: DISCONTINUED | OUTPATIENT
Start: 2019-12-02 | End: 2019-12-05

## 2019-12-02 RX ORDER — ASPIRIN 325 MG
1000 TABLET ORAL 3 TIMES DAILY
Status: DISCONTINUED | OUTPATIENT
Start: 2019-12-02 | End: 2019-12-03

## 2019-12-02 RX ORDER — KETOROLAC TROMETHAMINE 10 MG/1
10 TABLET, FILM COATED ORAL EVERY 6 HOURS PRN
Status: DISCONTINUED | OUTPATIENT
Start: 2019-12-02 | End: 2019-12-03

## 2019-12-02 RX ORDER — NITROGLYCERIN 0.4 MG/1
0.4 TABLET SUBLINGUAL
Status: DISCONTINUED | OUTPATIENT
Start: 2019-12-02 | End: 2019-12-06

## 2019-12-02 RX ORDER — COLCHICINE 0.6 MG/1
0.6 TABLET ORAL 2 TIMES DAILY
Status: DISCONTINUED | OUTPATIENT
Start: 2019-12-02 | End: 2019-12-06

## 2019-12-02 RX ORDER — FAMOTIDINE 20 MG/1
20 TABLET, FILM COATED ORAL 2 TIMES DAILY
Status: DISCONTINUED | OUTPATIENT
Start: 2019-12-02 | End: 2019-12-07 | Stop reason: HOSPADM

## 2019-12-02 RX ADMIN — ACETAMINOPHEN 650 MG: 325 TABLET ORAL at 16:16

## 2019-12-02 RX ADMIN — DOCUSATE SODIUM 100 MG: 100 CAPSULE, LIQUID FILLED ORAL at 08:24

## 2019-12-02 RX ADMIN — FAMOTIDINE 20 MG: 20 TABLET ORAL at 19:20

## 2019-12-02 RX ADMIN — LEVOTHYROXINE SODIUM 25 MCG: 25 TABLET ORAL at 05:47

## 2019-12-02 RX ADMIN — OXYCODONE HYDROCHLORIDE 2.5 MG: 10 TABLET ORAL at 08:23

## 2019-12-02 RX ADMIN — CEFAZOLIN SODIUM 2000 MG: 2 SOLUTION INTRAVENOUS at 02:56

## 2019-12-02 RX ADMIN — SODIUM CHLORIDE, SODIUM GLUCONATE, SODIUM ACETATE, POTASSIUM CHLORIDE AND MAGNESIUM CHLORIDE 75 ML/HR: 526; 502; 368; 37; 30 INJECTION, SOLUTION INTRAVENOUS at 12:05

## 2019-12-02 RX ADMIN — ENOXAPARIN SODIUM 40 MG: 40 INJECTION SUBCUTANEOUS at 08:23

## 2019-12-02 RX ADMIN — SODIUM CHLORIDE AND POTASSIUM CHLORIDE 75 ML/HR: .9; .15 SOLUTION INTRAVENOUS at 17:10

## 2019-12-02 RX ADMIN — OXYCODONE HYDROCHLORIDE 2.5 MG: 10 TABLET ORAL at 02:49

## 2019-12-02 RX ADMIN — KETOROLAC TROMETHAMINE 10 MG: 10 TABLET, FILM COATED ORAL at 22:32

## 2019-12-02 RX ADMIN — NORTRIPTYLINE HYDROCHLORIDE 50 MG: 50 CAPSULE ORAL at 22:35

## 2019-12-02 RX ADMIN — CEFAZOLIN SODIUM 2000 MG: 2 SOLUTION INTRAVENOUS at 19:00

## 2019-12-02 RX ADMIN — CEFAZOLIN SODIUM 2000 MG: 2 SOLUTION INTRAVENOUS at 10:11

## 2019-12-02 RX ADMIN — PSYLLIUM HUSK 1 PACKET: 3.4 POWDER ORAL at 08:25

## 2019-12-02 RX ADMIN — METHOTREXATE SODIUM 10 MG: 2.5 TABLET ORAL at 08:25

## 2019-12-02 RX ADMIN — PSYLLIUM HUSK 1 PACKET: 3.4 POWDER ORAL at 16:16

## 2019-12-02 RX ADMIN — GABAPENTIN 300 MG: 300 CAPSULE ORAL at 16:16

## 2019-12-02 RX ADMIN — GABAPENTIN 300 MG: 300 CAPSULE ORAL at 08:24

## 2019-12-02 RX ADMIN — COLCHICINE 0.6 MG: 0.6 TABLET, FILM COATED ORAL at 19:19

## 2019-12-02 RX ADMIN — ACETAMINOPHEN 650 MG: 325 TABLET ORAL at 08:24

## 2019-12-02 RX ADMIN — PREDNISONE 3 MG: 1 TABLET ORAL at 08:24

## 2019-12-02 RX ADMIN — GABAPENTIN 300 MG: 300 CAPSULE ORAL at 22:33

## 2019-12-02 RX ADMIN — DOCUSATE SODIUM 100 MG: 100 CAPSULE, LIQUID FILLED ORAL at 19:20

## 2019-12-02 RX ADMIN — PSYLLIUM HUSK 1 PACKET: 3.4 POWDER ORAL at 22:32

## 2019-12-02 RX ADMIN — ASPIRIN 325 MG ORAL TABLET 975 MG: 325 PILL ORAL at 22:33

## 2019-12-02 RX ADMIN — FOLIC ACID 1000 MCG: 1 TABLET ORAL at 08:24

## 2019-12-02 RX ADMIN — NITROGLYCERIN 0.4 MG: 0.4 TABLET SUBLINGUAL at 16:15

## 2019-12-02 NOTE — QUICK NOTE
RN paged that patient is having chest pain  Patient is seen and examined at bedside  She described sharp, retrosternal chest pain aggravated by taking deep breathing, localized and associated with dizziness  Vital signs:  Temperature 101 2° F, pulse 108, respiratory rate 20, blood pressure 126/85 and saturation 98% room air  She has regular heart sounds, S1/S2 normal, she has pericardial rub and end inspiratory crackles  Assessment/Plan:  # suspect myopericarditis  · Given her atypical chest pain, pleuritic in nature, history of rheumatoid arthritis, elevated CRP more than 90 yesterday, elevated troponin above 0 83 and EKG with MA depression on multiple ST elevations all discussed with Cardiology fellow on-call who recommended treatment for myopericarditis  · I will also consult Cardiology  · Sublingual nitro 0 4 mg for chest pain every 5 minutes x3 max  · Given her temperature will repeat blood culture  · Stat limited echo this was reviewed by on-call cardiology fellow no obvious regional wall motion abnormalities    · Stat chest x-ray  · I will also check for Lyme disease  · Serial EKGs with trending troponins  · Aspirin 1 g t i d   · Colchicine 0 6 mg b i d   · Pepcid 20 mg twice a day for peptic ulcer prophylaxis given high-dose aspirin    Naheed Garza MD  [email or web address]  [facility]  Internal medicine resident

## 2019-12-02 NOTE — CONSULTS
Consult - [facility] 75 y o  female MRN: [id number]  Unit/Bed#: [facility] 723-01 Encounter: [id number]    Assessment/Plan     Assessment:  76 y/o female with history of polionormal EKG, rheumatoid arthritis, hypothyroidism, urinary tract infection presents with left lower extremity cellulitis   1  Left lateral 5th metatarsal head blister   2  Left submet 5 pre-ulcerative lesion   3  Sepsis     Plan:  - Left lateral foot blister was deroofed, expressed very small amount of purulent drainage  Ulcer appears superficial and does not probe deep  Attempted to debride the plantar foot callus however due to pain pt was unable to tolerate it  Dressed with DSD    - Continue IV ancef for cellulitis as per ID recommendations   - left foot xray, CT with contrast reviewed  Xray - digital contracture consistent with RA and inverted foot deformity hx of polio  CT with contrast shows Ulceration noted plantar to the 5th metatarsal head, without evidence of osteomyelitis, no abscess noted  - pt to f/u with Dr Nani Hernandes for appropriate lower extremity brace prescription  - rest of care per primary service     History of Present Illness     HPI:  Pelon Carlos is a 75 y o  female who presents with left leg cellulitis extending from the ankle to the level of tibial tuberosity  Patient also presents with left lateral 5th met head blister appeared to be pus filled and sub met 5 preulcerative lesion  Patient states she noticed left leg cellulitis with increasing amount of swelling and pain this past Saturday therefore decided to visit nearby emergency department and was admitted initially it under observation  Patient was started on broad-spectrum IV antibiotics vancomycin with id consultation will transition patient for IV ancef  Patient states she had a lesion submetatarsal head 5 for which she saw a podiatrist and hallux down the she does not remember the name of    Patient states the podiatrist debrided the callus/wart and electrocauterized approximately 4 times  She stopped going to the podiatrist in 7 months ago as her insurance changed and had high co-pay  Patient has been filing her callus at home with a pumice stone  She does not remember when she developed left lateral foot blister  She has been complaining of extreme pain to the left lower extremity  Denies nausea vomiting fever chills shortness of breath  Consults  Review of Systems   Constitutional: Negative  HENT: Negative  Eyes: Negative  Respiratory: Negative  Cardiovascular: Negative  Gastrointestinal: Negative  Musculoskeletal:  Left leg erythema and edema   Skin:  Left leg   Neurological: Negative  Psych: negative         Historical Information   Past Medical History:   Diagnosis Date    Anxiety     Depression     Disease of thyroid gland     HYPO    Femur neck fracture (HCC)     GERD (gastroesophageal reflux disease)     Hyperthyroidism     RESOLVED: [id number]    Osteoporosis     Polio     [facility]     Right BBB/left ant fasc block     UTI (urinary tract infection)     Varicella infection     LAST ASSESSED: [id number]     Past Surgical History:   Procedure Laterality Date    APPENDECTOMY      HIP ARTHROPLASTY Left 11/27/2017    Procedure: REMOVAL IM NAIL CONVERSION TO TOTAL HIP ARTHROPLASTY POSTERIOR APPROACH;  Surgeon: Jake Christy MD;  [facility];  Service: Orthopedics    HIP FRACTURE SURGERY      HIP SURGERY      both hips replaced;2013 left ,2014 right    JOINT REPLACEMENT Right     HIP TOTAL    [place] CONV PREV HIP SURG TO TOT HIP Grady Fearing Left 10/4/2017    Procedure: Fayne Tim removal of left hip;  Surgeon: Jake Christy MD;  [facility];  Service: Orthopedics    REVISION TOTAL HIP ARTHROPLASTY Right     TONSILLECTOMY       Social History   Social History     Substance and Sexual Activity   Alcohol Use No    Frequency: Never     Social History     Substance and Sexual Activity   Drug Use No     Social History     Tobacco Use   Smoking Status Former Smoker   Smokeless Tobacco Never Used   Tobacco Comment    quit 20-30 years ago; NEVER A SMOKER AS PER ALL SCRIPTS      Family History:   Family History   Problem Relation Age of Onset    Rheum arthritis Mother     Rheum arthritis Sister     Prostate cancer Brother        Meds/Allergies   Medications Prior to Admission   Medication    acetaminophen (TYLENOL) 325 mg tablet    Ascorbic Acid (VITAMIN C) 1000 MG tablet    cholecalciferol (VITAMIN D3) 1,000 units tablet    docusate sodium (COLACE) 100 mg capsule    famotidine (PEPCID) 20 mg tablet    folic acid (FOLVITE) 1 mg tablet    levothyroxine 25 mcg tablet    methotrexate 2 5 MG tablet    Multiple Vitamins-Minerals (CENTRUM SILVER PO)    nortriptyline (PAMELOR) 50 mg capsule    predniSONE 1 mg tablet    Psyllium (METAMUCIL FIBER PO)    Vitamin E 400 units TABS    gabapentin (NEURONTIN) 300 mg capsule     No Known Allergies    Objective   First Vitals:   Blood Pressure: 141/77 (11/30/19 1257)  Pulse: 102 (11/30/19 1257)  Temperature: 98 2 °F (36 8 °C) (11/30/19 1257)  Temp Source: Oral (11/30/19 1257)  Respirations: 17 (11/30/19 1257)  Height: 5' (152 4 cm) (11/30/19 1257)  Weight - Scale: 45 4 kg (100 lb) (11/30/19 1257)  SpO2: 99 % (11/30/19 1400)    Current Vitals:   Blood Pressure: 120/72 (12/02/19 0709)  Pulse: (!) 116 (12/02/19 1422)  Temperature: 98 5 °F (36 9 °C) (12/01/19 1100)  Temp Source: Oral (11/30/19 1257)  Respirations: 20 (12/01/19 2211)  Height: 5' (152 4 cm) (11/30/19 1257)  Weight - Scale: 45 4 kg (100 lb) (11/30/19 1257)  SpO2: 100 % (12/01/19 1509)        /72   Pulse (!) 116   Temp 98 5 °F (36 9 °C)   Resp 20   Ht 5' (1 524 m)   Wt 45 4 kg (100 lb)   SpO2 100%   BMI 19 53 kg/m²      General Appearance:    Alert, cooperative, no distress   Head:    Normocephalic, without obvious abnormality, atraumatic   Eyes:    PERRL, conjunctiva/corneas clear, EOM's intact        Nose:   Moist mucous membranes   Neck:   Supple, symmetrical, trachea midline   Back:     Symmetric   Lungs:     Respirations unlabored   Heart:    Regular rate and rhythm, S1 and S2 normal, no murmur, rub   or gallop   Abdomen:     Soft, non-tender   Extremities:   Left lower extremity with pitting edema and erythema  Erythema extending from midfoot to proximally to the level of tibial tuberosity  +2 pitting edema noted to the level of ankle left  Limited range of motion to the left lower extremity due to pain  Bilateral foot inverted from the level of ankle  Pulses:   Faintly palpable pedal pulses bilateral   Triphasic dopplerable signals DP and PT left  Skin:   Left lateral 5th metatarsal head superficial wound - stable  Left sub met 5th pre ulcerative lesion  Neurologic:   Gross sensation is intact  Protective sensation is intact             Lab Results:   Admission on 11/30/2019   Component Date Value    WBC 11/30/2019 18 10*    RBC 11/30/2019 4 08     Hemoglobin 11/30/2019 12 0     Hematocrit 11/30/2019 38 3     MCV 11/30/2019 94     MCH 11/30/2019 29 4     MCHC 11/30/2019 31 3*    RDW 11/30/2019 14 9     MPV 11/30/2019 8 9     Platelets 93/80/6660 261     nRBC 11/30/2019 0     Neutrophils Relative 11/30/2019 90*    Immat GRANS % 11/30/2019 1     Lymphocytes Relative 11/30/2019 3*    Monocytes Relative 11/30/2019 6     Eosinophils Relative 11/30/2019 0     Basophils Relative 11/30/2019 0     Neutrophils Absolute 11/30/2019 16 34*    Immature Grans Absolute 11/30/2019 0 20     Lymphocytes Absolute 11/30/2019 0 46*    Monocytes Absolute 11/30/2019 1 07     Eosinophils Absolute 11/30/2019 0 00     Basophils Absolute 11/30/2019 0 03     Sodium 11/30/2019 137     Potassium 11/30/2019 4 8     Chloride 11/30/2019 104     CO2 11/30/2019 28     ANION GAP 11/30/2019 5     BUN 11/30/2019 15     Creatinine 11/30/2019 0 71     Glucose 11/30/2019 107     Calcium 11/30/2019 9 3     AST 11/30/2019 53*    ALT 11/30/2019 32     Alkaline Phosphatase 11/30/2019 87     Total Protein 11/30/2019 7 6     Albumin 11/30/2019 3 8     Total Bilirubin 11/30/2019 0 65     eGFR 11/30/2019 84     Troponin I 11/30/2019 <0 02     Color, UA 11/30/2019 see chart     Color, UA 11/30/2019 Yellow     Clarity, UA 11/30/2019 Clear     pH, UA 11/30/2019 >=9 0*    Leukocytes, UA 11/30/2019 Moderate*    Nitrite, UA 11/30/2019 Positive*    Protein, UA 11/30/2019 30 (1+)*    Glucose, UA 11/30/2019 Negative     Ketones, UA 11/30/2019 Trace*    Urobilinogen, UA 11/30/2019 0 2     Bilirubin, UA 11/30/2019 Negative     Blood, UA 11/30/2019 Moderate*    Specific [place], UA 11/30/2019 1 010     RBC, UA 11/30/2019 4-10*    WBC, UA 11/30/2019 30-50*    Epithelial Cells 11/30/2019 Occasional     Bacteria, UA 11/30/2019 Innumerable*    Urine Culture 11/30/2019 >100,000 cfu/ml Proteus mirabilis*    TSH 3RD GENERATON 11/30/2019 1 290     LISA 11/30/2019 Positive*    LACTIC ACID 11/30/2019 1 7     Blood Culture 11/30/2019 No Growth at 24 hrs   Blood Culture 11/30/2019 No Growth at 24 hrs       Troponin I 11/30/2019 <0 02     Troponin I 11/30/2019 <0 02     MRSA Culture Only 11/30/2019 No Methicillin Resistant Staphlyococcus aureus (MRSA) isolated     Sodium 12/01/2019 136     Potassium 12/01/2019 3 6     Chloride 12/01/2019 104     CO2 12/01/2019 25     ANION GAP 12/01/2019 7     BUN 12/01/2019 16     Creatinine 12/01/2019 0 50*    Glucose 12/01/2019 109     Calcium 12/01/2019 8 2*    eGFR 12/01/2019 95     WBC 12/01/2019 13 68*    RBC 12/01/2019 3 29*    Hemoglobin 12/01/2019 9 8*    Hematocrit 12/01/2019 30 9*    MCV 12/01/2019 94     MCH 12/01/2019 29 8     MCHC 12/01/2019 31 7     RDW 12/01/2019 14 9     MPV 12/01/2019 8 9     Platelets 79/40/8108 202     nRBC 12/01/2019 0     Neutrophils Relative 12/01/2019 85*    Immat GRANS % 12/01/2019 1     Lymphocytes Relative 12/01/2019 6*    Monocytes Relative 12/01/2019 8     Eosinophils Relative 12/01/2019 0     Basophils Relative 12/01/2019 0     Neutrophils Absolute 12/01/2019 11 69*    Immature Grans Absolute 12/01/2019 0 10     Lymphocytes Absolute 12/01/2019 0 75     Monocytes Absolute 12/01/2019 1 13     Eosinophils Absolute 12/01/2019 0 00     Basophils Absolute 12/01/2019 0 01     Total CK 12/01/2019 133     LACTIC ACID 12/01/2019 1 9     Sodium 12/01/2019 136     Potassium 12/01/2019 3 6     Chloride 12/01/2019 105     CO2 12/01/2019 27     ANION GAP 12/01/2019 4     BUN 12/01/2019 13     Creatinine 12/01/2019 0 64     Glucose 12/01/2019 101     Calcium 12/01/2019 8 0*    AST 12/01/2019 24     ALT 12/01/2019 23     Alkaline Phosphatase 12/01/2019 97     Total Protein 12/01/2019 6 3*    Albumin 12/01/2019 2 6*    Total Bilirubin 12/01/2019 0 32     eGFR 12/01/2019 88     CRP 12/01/2019 >90 0*    CK-MB Index 12/01/2019 1 1     CK-MB 12/01/2019 1 4     Ventricular Rate 12/01/2019 89     Atrial Rate 12/01/2019 89     AZ Interval 12/01/2019 112     QRSD Interval 12/01/2019 124     QT Interval 12/01/2019 390     QTC Interval 12/01/2019 474     P Axis 12/01/2019 63     QRS Axis 12/01/2019 85     T Wave Axis 12/01/2019 75     Ventricular Rate 11/30/2019 95     Atrial Rate 11/30/2019 95     AZ Interval 11/30/2019 118     QRSD Interval 11/30/2019 114     QT Interval 11/30/2019 336     QTC Interval 11/30/2019 422     P Axis 11/30/2019 75     QRS Axis 11/30/2019 100     T Wave Axis 11/30/2019 70     WBC 12/02/2019 11 80*    RBC 12/02/2019 3 39*    Hemoglobin 12/02/2019 9 9*    Hematocrit 12/02/2019 32 6*    MCV 12/02/2019 96     MCH 12/02/2019 29 2     MCHC 12/02/2019 30 4*    RDW 12/02/2019 14 9     MPV 12/02/2019 10 0     Platelets 71/86/0709 209     nRBC 12/02/2019 0     Neutrophils Relative 12/02/2019 84*    Immat GRANS % 12/02/2019 1     Lymphocytes Relative 12/02/2019 7*    Monocytes Relative 12/02/2019 8     Eosinophils Relative 12/02/2019 0     Basophils Relative 12/02/2019 0     Neutrophils Absolute 12/02/2019 9 86*    Immature Grans Absolute 12/02/2019 0 09     Lymphocytes Absolute 12/02/2019 0 81     Monocytes Absolute 12/02/2019 0 99     Eosinophils Absolute 12/02/2019 0 03     Basophils Absolute 12/02/2019 0 02     Sodium 12/02/2019 131*    Potassium 12/02/2019 3 6     Chloride 12/02/2019 102     CO2 12/02/2019 22     ANION GAP 12/02/2019 7     BUN 12/02/2019 9     Creatinine 12/02/2019 0 41*    Glucose 12/02/2019 109     Calcium 12/02/2019 7 8*    eGFR 12/02/2019 101     Antistreptolysin O Screen 12/02/2019 Negative (<200 IU/ml)     LISA Titer 1 11/30/2019 Titer greater than or equal to 1280     LISA Pattern 1 11/30/2019 Speckled pattern              Results from last 7 days   Lab Units 11/30/19  1825 11/30/19  1747   BLOOD CULTURE  No Growth at 24 hrs  No Growth at 24 hrs  Invalid input(s): LABAEARO            Imaging: I have personally reviewed pertinent films in PACS  EKG, Pathology, and Other Studies: I have personally reviewed pertinent reports        Code Status: Level 1 - Full Code  Advance Directive and Living Will:      Power of :    POLST:

## 2019-12-02 NOTE — CONSULTS
Vancomycin IV Pharmacy-to-Dose Consultation    Jaun Santos is a 75 y o  female who was receiving Vancomycin IV with management by the Pharmacy Consult service for treatment of skin-soft tissue infection    The patients Vancomycin therapy has been completed / discontinued  Thank you for allowing us to take part in this patient's care  Pharmacy will sign-off now; please call or re-consult if there are any questions  Samanta LEMON   Ph  Staff Pharmacist

## 2019-12-02 NOTE — PROGRESS NOTES
Pt c/o chest pain vss pt stated worse with deep breath SOD B resident here assessing pt   EKG troponin ordered PCA aware

## 2019-12-02 NOTE — CONSULTS
Consult Note - Wound   Geetha Colon 75 y o  female MRN: [id number]  Unit/Bed#: [facility] 723-01 Encounter: [id number]        History and Present Illness:  Patient is a 75year old female who presented with complaints of LLE swelling, erythema and pain  Per patient she has not been able to ambulate secondary to pain  Patient with hx or RA on chronic prednisone and methotrexate  Patient agreeable for skin assessment  BMI: Estimated body mass index is 19 53 kg/m² as calculated from the following:    Height as of this encounter: 5' (1 524 m)  Weight as of this encounter: 45 4 kg (100 lb)      History  Past Medical History:   Diagnosis Date    Anxiety     Depression     Disease of thyroid gland     HYPO    Femur neck fracture (HCC)     GERD (gastroesophageal reflux disease)     Hyperthyroidism     RESOLVED: [id number]    Osteoporosis     Polio     PVD (peripheral vascular disease) (HCC)     Right BBB/left ant fasc block     UTI (urinary tract infection)     Varicella infection     LAST ASSESSED: [id number]     Past Surgical History:   Procedure Laterality Date    APPENDECTOMY      HIP ARTHROPLASTY Left 11/27/2017    Procedure: REMOVAL IM NAIL CONVERSION TO TOTAL HIP ARTHROPLASTY POSTERIOR APPROACH;  Surgeon: Duane Henri, MD;  Location: [facility];  Service: Orthopedics    HIP FRACTURE SURGERY      HIP SURGERY      both hips replaced;2013 left ,2014 right    JOINT REPLACEMENT Right     HIP TOTAL    MA CONV PREV HIP SURG TO TOT HIP ARTHROPLAS Left 10/4/2017    Procedure: Hareware removal of left hip;  Surgeon: Duane Henri, MD;  Location: [facility];  Service: Orthopedics    REVISION TOTAL HIP ARTHROPLASTY Right     TONSILLECTOMY         Problem List:   Patient Active Problem List   Diagnosis    Anxiety    Peripheral vascular disease ([address] )    RLS (restless legs syndrome)    S/P total hip arthroplasty    Hypothyroidism    Osteoporosis    RBBB    Ambulatory dysfunction    Closed compression fracture of L3 lumbar vertebra    Rheumatoid arthritis involving multiple sites with positive rheumatoid factor (HCC)    Chronic pain syndrome    Other insomnia    Vitamin D deficiency    Dyspepsia    Sepsis (HCC)    Malar rash    Cellulitis of left lower extremity    Abnormal EKG    Urinary tract infection due to Proteus       Medications:  Current Facility-Administered Medications   Medication Dose Route Frequency Provider Last Rate Last Dose    acetaminophen (TYLENOL) tablet 650 mg  650 mg Oral Q6H PRN Bassem Banai, DO   650 mg at 12/02/19 0824    ceFAZolin (ANCEF) IVPB (premix) 2,000 mg  2,000 mg Intravenous Q8H Danna Prajapati  mL/hr at 12/02/19 1011 2,000 mg at 12/02/19 1011    docusate sodium (COLACE) capsule 100 mg  100 mg Oral BID Danna Prajapati MD   100 mg at 12/02/19 0824    enoxaparin (LOVENOX) subcutaneous injection 40 mg  40 mg Subcutaneous Daily Danna Prajapati MD   40 mg at 12/02/19 6342    famotidine (PEPCID) tablet 20 mg  20 mg Oral BID PRN Danna Prajapati MD        folic acid (FOLVITE) tablet 1,000 mcg  1,000 mcg Oral Daily Danna Prajapati MD   1,000 mcg at 12/02/19 9738    gabapentin (NEURONTIN) capsule 300 mg  300 mg Oral TID Danna Prajapati MD   300 mg at 12/02/19 8787    levothyroxine tablet 25 mcg  25 mcg Oral Early Morning Danna Prajapati MD   25 mcg at 12/02/19 0547    methotrexate tablet 10 mg  10 mg Oral Weekly Danna Prajapati MD   10 mg at 12/02/19 0825    multi-electrolyte (PLASMALYTE-A/ISOLYTE-S PH 7 4) IV solution  75 mL/hr Intravenous Continuous Danna Prajapati MD 75 mL/hr at 12/01/19 2001 75 mL/hr at 12/01/19 2001    nortriptyline (PAMELOR) capsule 50 mg  50 mg Oral HS Danna Prajapati MD   50 mg at 12/01/19 2152    oxyCODONE (ROXICODONE) IR tablet 2 5 mg  2 5 mg Oral Q4H PRN Bassem Banai, DO   2 5 mg at 12/02/19 0823    oxyCODONE (ROXICODONE) IR tablet 5 mg  5 mg Oral Q4H PRN Bassem Santillan DO   5 mg at 12/01/19 1350    predniSONE tablet 3 mg  3 mg Oral Daily Bassem Santillan, DO   3 mg at 12/02/19 0824    psyllium (METAMUCIL) 1 packet  1 packet Oral TID Artemio Santiago MD   1 packet at 12/02/19 0825       Assessment Findings:   1- LLE with ascending erythema and painful per patient's report  No open, skin areas or weeping noted to leg  Currently on IV antibiotic and elevating leg  Sacrum, buttocks and heels are intact, no other issues seen  We will place prophylactic skin care orders         Skin care plans:  1-Hydraguard to bilateral sacrum, buttock and heels TID and PRN  2-Elevate heels to offload pressure  3-Ehob cushion in chair when out of bed  4-Moisturize skin daily with skin nourishing cream   5-Turn/reposition q2h or when medically stable for pressure re-distribution on skin  Wound care is signing off, please call ext [phone] or [phone] with questions or concerns         Luz Medina RN, BSN, [employer]

## 2019-12-02 NOTE — PROGRESS NOTES
IM Residency Progress Note   [facility] Encounter: [id number]  SOD Team B       Geetah Colon 75 y o  female [id number]    Hospital Stay Days: 1      Assessment/Plan:    Principal Problem:    Sepsis ([facility] [address] )  Active Problems:    Peripheral vascular disease ([facility] )    Hypothyroidism    RBBB    Rheumatoid arthritis involving multiple sites with positive rheumatoid factor (HCC)    Malar rash    Cellulitis of left lower extremity    Abnormal EKG    Urinary tract infection due to Proteus     1  Sepsis - POA  -likely secondary to cellulitis now her urine is positive for P Mirabilis asymptomatic   patient presented with leukocytosis and tachycardia with suspicion for infection of lower extremity given pain, swelling and erythema likely secondary to strep cellulitis  No evidence of purulence, or crepitus  Patient received 1 g IV ceftriaxone in the ED  Currently afebrile, complaining of chills, fever and continued pain  - x-ray ankle, foot, tib-fib of left extremity showed soft tissue swelling No fluid collection or gas  - blood culture x2 are negative preliminary    - vancomycin stopped per ID / cefazolin 2 g Q 8 per ID   - IV fluids at 75cc/hour   - MRSA culture pending  - oxycodone for pain 2 5mg for severe pain and oxycodone 5mg for breakthrough pain  - Erythema improving in left lower ext    No crepitus noted on exam  Motor function in left foot and toes intact, pulses intact  -LRINEC score for nec fasc is 0, low risk, but will obtain stat CT left foot and leg showed same finding as Xray          2  Rheumatoid arthritis   - prednisone 3 mg a day  - methotrexate once a week every Monday-10mg  -LISA pending, anti histone pending   - skin care plan noted per RN        3  Abnormal EKG - evidence of inverted T-waves in V3 and V4 with possible ST depressions in the inferior leads, changes from prior studies  - follow-up EKG  - lactate negative  - trend troponin negative x3               4   Malar rash - rash does appear to spare the nasal labial fold now improving   - follow LISA        5  Hypothyroidism  - continue levothyroxine     6  UTI due to P  Mirabilis  · POA likely colonized given being asymptomatic  · Already on Cefazolin 2 g as above   · Awaiting sensitivity    7  Left foot acquired deformity  · Likely due to rheumatoid arthritis  · Consulted podiatric for recommendation regarding special shoes  Disposition:  Continue inpatient , Pending final infectious Disease recommendation and improvement of left lower extremity cellulitis  Subjective:   Patient is seen and examined at bedside  No acute events overnight  She reported fever and chills intermittently, her appetite is ok, her urine is positive for P Mirabilis no dysuria  Vitals: Temp (24hrs), Av 5 °F (36 9 °C), Min:98 5 °F (36 9 °C), Max:98 5 °F (36 9 °C)  Current: Temperature: 98 5 °F (36 9 °C)  Vitals:    19 1509 19 1908 19 2211 19 0709   BP: 117/65 117/67 121/71 120/72   Pulse: 81      Resp:  16 20    Temp:       TempSrc:       SpO2: 100%      Weight:       Height:        Body mass index is 19 53 kg/m²  I/O last 24 hours: In: 2518 8 [P O :240; I V :2208 8;  IV Piggyback:70]  Out: 700 [Urine:700]      Physical Exam: /72   Pulse 81   Temp 98 5 °F (36 9 °C)   Resp 20   Ht 5' (1 524 m)   Wt 45 4 kg (100 lb)   SpO2 100%   BMI 19 53 kg/m²   General appearance: alert and oriented, in no acute distress  Eyes: Non icterus  Neck: no adenopathy  Lungs: clear to auscultation bilaterally  Heart: regular rate and rhythm, S1, S2 normal, no murmur, click, rub or gallop  Abdomen: Soft, nontender, hypoactive bowel sounds  Extremities: Warmth, erythema improving of the distal left lower extremity  Skin: Improving erythema in the left lateral lower ext  Neurologic: Grossly normal     Invasive Devices     Peripheral Intravenous Line            Peripheral IV 19 Right Forearm 1 day    Peripheral IV 19 Right Antecubital less than 1 day                          Labs:   Recent Results (from the past 24 hour(s))   CK (with reflex to MB)    Collection Time: 12/01/19  2:01 PM   Result Value Ref Range    Total  26 - 192 U/L   Lactic acid, plasma    Collection Time: 12/01/19  2:01 PM   Result Value Ref Range    LACTIC ACID 1 9 0 5 - 2 0 mmol/L   Comprehensive metabolic panel    Collection Time: 12/01/19  2:01 PM   Result Value Ref Range    Sodium 136 136 - 145 mmol/L    Potassium 3 6 3 5 - 5 3 mmol/L    Chloride 105 100 - 108 mmol/L    CO2 27 21 - 32 mmol/L    ANION GAP 4 4 - 13 mmol/L    BUN 13 5 - 25 mg/dL    Creatinine 0 64 0 60 - 1 30 mg/dL    Glucose 101 65 - 140 mg/dL    Calcium 8 0 (L) 8 3 - 10 1 mg/dL    AST 24 5 - 45 U/L    ALT 23 12 - 78 U/L    Alkaline Phosphatase 97 46 - 116 U/L    Total Protein 6 3 (L) 6 4 - 8 2 g/dL    Albumin 2 6 (L) 3 5 - 5 0 g/dL    Total Bilirubin 0 32 0 20 - 1 00 mg/dL    eGFR 88 ml/min/1 73sq m   C-reactive protein    Collection Time: 12/01/19  2:01 PM   Result Value Ref Range    CRP >90 0 (H) <3 0 mg/L   CKMB    Collection Time: 12/01/19  2:01 PM   Result Value Ref Range    CK-MB Index 1 1 0 0 - 2 5 %    CK-MB 1 4 0 0 - 5 0 ng/mL   CBC and differential    Collection Time: 12/02/19  5:06 AM   Result Value Ref Range    WBC 11 80 (H) 4 31 - 10 16 Thousand/uL    RBC 3 39 (L) 3 81 - 5 12 Million/uL    Hemoglobin 9 9 (L) 11 5 - 15 4 g/dL    Hematocrit 32 6 (L) 34 8 - 46 1 %    MCV 96 82 - 98 fL    MCH 29 2 26 8 - 34 3 pg    MCHC 30 4 (L) 31 4 - 37 4 g/dL    RDW 14 9 11 6 - 15 1 %    MPV 10 0 8 9 - 12 7 fL    Platelets 253 356 - 914 Thousands/uL    nRBC 0 /100 WBCs    Neutrophils Relative 84 (H) 43 - 75 %    Immat GRANS % 1 0 - 2 %    Lymphocytes Relative 7 (L) 14 - 44 %    Monocytes Relative 8 4 - 12 %    Eosinophils Relative 0 0 - 6 %    Basophils Relative 0 0 - 1 %    Neutrophils Absolute 9 86 (H) 1 85 - 7 62 Thousands/µL    Immature Grans Absolute 0 09 0 00 - 0 20 Thousand/uL Lymphocytes Absolute 0 81 0 60 - 4 47 Thousands/µL    Monocytes Absolute 0 99 0 17 - 1 22 Thousand/µL    Eosinophils Absolute 0 03 0 00 - 0 61 Thousand/µL    Basophils Absolute 0 02 0 00 - 0 10 Thousands/µL   Basic metabolic panel    Collection Time: 12/02/19  5:06 AM   Result Value Ref Range    Sodium 131 (L) 136 - 145 mmol/L    Potassium 3 6 3 5 - 5 3 mmol/L    Chloride 102 100 - 108 mmol/L    CO2 22 21 - 32 mmol/L    ANION GAP 7 4 - 13 mmol/L    BUN 9 5 - 25 mg/dL    Creatinine 0 41 (L) 0 60 - 1 30 mg/dL    Glucose 109 65 - 140 mg/dL    Calcium 7 8 (L) 8 3 - 10 1 mg/dL    eGFR 101 ml/min/1 73sq m       Radiology Results: I have personally reviewed pertinent reports  Other Diagnostic Testing:   I have personally reviewed pertinent reports          Active Meds:   Current Facility-Administered Medications   Medication Dose Route Frequency    acetaminophen (TYLENOL) tablet 650 mg  650 mg Oral Q6H PRN    ceFAZolin (ANCEF) IVPB (premix) 2,000 mg  2,000 mg Intravenous Q8H    docusate sodium (COLACE) capsule 100 mg  100 mg Oral BID    enoxaparin (LOVENOX) subcutaneous injection 40 mg  40 mg Subcutaneous Daily    famotidine (PEPCID) tablet 20 mg  20 mg Oral BID PRN    folic acid (FOLVITE) tablet 1,000 mcg  1,000 mcg Oral Daily    gabapentin (NEURONTIN) capsule 300 mg  300 mg Oral TID    levothyroxine tablet 25 mcg  25 mcg Oral Early Morning    methotrexate tablet 10 mg  10 mg Oral Weekly    multi-electrolyte (PLASMALYTE-A/ISOLYTE-S PH 7 4) IV solution  75 mL/hr Intravenous Continuous    nortriptyline (PAMELOR) capsule 50 mg  50 mg Oral HS    oxyCODONE (ROXICODONE) IR tablet 2 5 mg  2 5 mg Oral Q4H PRN    oxyCODONE (ROXICODONE) IR tablet 5 mg  5 mg Oral Q4H PRN    predniSONE tablet 3 mg  3 mg Oral Daily    psyllium (METAMUCIL) 1 packet  1 packet Oral TID         VTE Pharmacologic Prophylaxis: Enoxaparin (Lovenox)  VTE Mechanical Prophylaxis: sequential compression device    Wellington Concepcion MD PGY3

## 2019-12-03 PROBLEM — K59.03 DRUG-INDUCED CONSTIPATION: Status: ACTIVE | Noted: 2019-12-03

## 2019-12-03 LAB
ANION GAP SERPL CALCULATED.3IONS-SCNC: 4 MMOL/L (ref 4–13)
ATRIAL RATE: 105 BPM
BASOPHILS # BLD AUTO: 0.02 THOUSANDS/ΜL (ref 0–0.1)
BASOPHILS NFR BLD AUTO: 0 % (ref 0–1)
BUN SERPL-MCNC: 8 MG/DL (ref 5–25)
CALCIUM SERPL-MCNC: 7.8 MG/DL (ref 8.3–10.1)
CHLORIDE SERPL-SCNC: 108 MMOL/L (ref 100–108)
CO2 SERPL-SCNC: 25 MMOL/L (ref 21–32)
CREAT SERPL-MCNC: 0.52 MG/DL (ref 0.6–1.3)
EOSINOPHIL # BLD AUTO: 0.05 THOUSAND/ΜL (ref 0–0.61)
EOSINOPHIL NFR BLD AUTO: 1 % (ref 0–6)
ERYTHROCYTE [DISTWIDTH] IN BLOOD BY AUTOMATED COUNT: 14.6 % (ref 11.6–15.1)
GFR SERPL CREATININE-BSD FRML MDRD: 94 ML/MIN/1.73SQ M
GLUCOSE SERPL-MCNC: 100 MG/DL (ref 65–140)
HCT VFR BLD AUTO: 32.6 % (ref 34.8–46.1)
HGB BLD-MCNC: 10 G/DL (ref 11.5–15.4)
HISTONE IGG SER IA-ACNC: 1.9 UNITS (ref 0–0.9)
IMM GRANULOCYTES # BLD AUTO: 0.1 THOUSAND/UL (ref 0–0.2)
IMM GRANULOCYTES NFR BLD AUTO: 1 % (ref 0–2)
LYMPHOCYTES # BLD AUTO: 0.64 THOUSANDS/ΜL (ref 0.6–4.47)
LYMPHOCYTES NFR BLD AUTO: 6 % (ref 14–44)
MAGNESIUM SERPL-MCNC: 2.4 MG/DL (ref 1.6–2.6)
MCH RBC QN AUTO: 29 PG (ref 26.8–34.3)
MCHC RBC AUTO-ENTMCNC: 30.7 G/DL (ref 31.4–37.4)
MCV RBC AUTO: 95 FL (ref 82–98)
MONOCYTES # BLD AUTO: 0.76 THOUSAND/ΜL (ref 0.17–1.22)
MONOCYTES NFR BLD AUTO: 7 % (ref 4–12)
NEUTROPHILS # BLD AUTO: 8.94 THOUSANDS/ΜL (ref 1.85–7.62)
NEUTS SEG NFR BLD AUTO: 85 % (ref 43–75)
NRBC BLD AUTO-RTO: 0 /100 WBCS
P AXIS: 70 DEGREES
PLATELET # BLD AUTO: 241 THOUSANDS/UL (ref 149–390)
PMV BLD AUTO: 9.4 FL (ref 8.9–12.7)
POTASSIUM SERPL-SCNC: 3.4 MMOL/L (ref 3.5–5.3)
PR INTERVAL: 114 MS
QRS AXIS: 76 DEGREES
QRSD INTERVAL: 118 MS
QT INTERVAL: 338 MS
QTC INTERVAL: 446 MS
RBC # BLD AUTO: 3.45 MILLION/UL (ref 3.81–5.12)
SODIUM SERPL-SCNC: 137 MMOL/L (ref 136–145)
T WAVE AXIS: 63 DEGREES
VENTRICULAR RATE: 105 BPM
WBC # BLD AUTO: 10.51 THOUSAND/UL (ref 4.31–10.16)

## 2019-12-03 PROCEDURE — G8979 MOBILITY GOAL STATUS: HCPCS

## 2019-12-03 PROCEDURE — 99232 SBSQ HOSP IP/OBS MODERATE 35: CPT | Performed by: INTERNAL MEDICINE

## 2019-12-03 PROCEDURE — 85025 COMPLETE CBC W/AUTO DIFF WBC: CPT | Performed by: INTERNAL MEDICINE

## 2019-12-03 PROCEDURE — 99222 1ST HOSP IP/OBS MODERATE 55: CPT | Performed by: INTERNAL MEDICINE

## 2019-12-03 PROCEDURE — 80048 BASIC METABOLIC PNL TOTAL CA: CPT | Performed by: INTERNAL MEDICINE

## 2019-12-03 PROCEDURE — 83735 ASSAY OF MAGNESIUM: CPT | Performed by: INTERNAL MEDICINE

## 2019-12-03 PROCEDURE — 93010 ELECTROCARDIOGRAM REPORT: CPT | Performed by: INTERNAL MEDICINE

## 2019-12-03 PROCEDURE — G8978 MOBILITY CURRENT STATUS: HCPCS

## 2019-12-03 PROCEDURE — 97163 PT EVAL HIGH COMPLEX 45 MIN: CPT

## 2019-12-03 RX ORDER — ASPIRIN 325 MG
600 TABLET ORAL 3 TIMES DAILY
Status: DISCONTINUED | OUTPATIENT
Start: 2019-12-03 | End: 2019-12-07 | Stop reason: HOSPADM

## 2019-12-03 RX ORDER — BISACODYL 10 MG
10 SUPPOSITORY, RECTAL RECTAL DAILY PRN
Status: DISCONTINUED | OUTPATIENT
Start: 2019-12-03 | End: 2019-12-07 | Stop reason: HOSPADM

## 2019-12-03 RX ORDER — POLYETHYLENE GLYCOL 3350 17 G/17G
17 POWDER, FOR SOLUTION ORAL DAILY
Status: DISCONTINUED | OUTPATIENT
Start: 2019-12-03 | End: 2019-12-07 | Stop reason: HOSPADM

## 2019-12-03 RX ORDER — POTASSIUM CHLORIDE 20 MEQ/1
40 TABLET, EXTENDED RELEASE ORAL ONCE
Status: COMPLETED | OUTPATIENT
Start: 2019-12-03 | End: 2019-12-03

## 2019-12-03 RX ADMIN — FAMOTIDINE 20 MG: 20 TABLET ORAL at 18:22

## 2019-12-03 RX ADMIN — CEFAZOLIN SODIUM 2000 MG: 2 SOLUTION INTRAVENOUS at 02:00

## 2019-12-03 RX ADMIN — POTASSIUM CHLORIDE 40 MEQ: 1500 TABLET, EXTENDED RELEASE ORAL at 11:29

## 2019-12-03 RX ADMIN — GABAPENTIN 300 MG: 300 CAPSULE ORAL at 22:26

## 2019-12-03 RX ADMIN — OXYCODONE HYDROCHLORIDE 2.5 MG: 10 TABLET ORAL at 04:22

## 2019-12-03 RX ADMIN — DOCUSATE SODIUM 100 MG: 100 CAPSULE, LIQUID FILLED ORAL at 08:24

## 2019-12-03 RX ADMIN — OXYCODONE HYDROCHLORIDE 5 MG: 5 TABLET ORAL at 22:26

## 2019-12-03 RX ADMIN — ASPIRIN 325 MG ORAL TABLET 650 MG: 325 PILL ORAL at 22:26

## 2019-12-03 RX ADMIN — ACETAMINOPHEN 650 MG: 325 TABLET ORAL at 01:59

## 2019-12-03 RX ADMIN — GABAPENTIN 300 MG: 300 CAPSULE ORAL at 08:24

## 2019-12-03 RX ADMIN — PREDNISONE 3 MG: 1 TABLET ORAL at 08:25

## 2019-12-03 RX ADMIN — GABAPENTIN 300 MG: 300 CAPSULE ORAL at 16:46

## 2019-12-03 RX ADMIN — NORTRIPTYLINE HYDROCHLORIDE 50 MG: 50 CAPSULE ORAL at 22:27

## 2019-12-03 RX ADMIN — COLCHICINE 0.6 MG: 0.6 TABLET, FILM COATED ORAL at 18:22

## 2019-12-03 RX ADMIN — ACETAMINOPHEN 650 MG: 325 TABLET ORAL at 14:49

## 2019-12-03 RX ADMIN — ASPIRIN 325 MG ORAL TABLET 650 MG: 325 PILL ORAL at 16:46

## 2019-12-03 RX ADMIN — DOCUSATE SODIUM 100 MG: 100 CAPSULE, LIQUID FILLED ORAL at 18:22

## 2019-12-03 RX ADMIN — LEVOTHYROXINE SODIUM 25 MCG: 25 TABLET ORAL at 05:05

## 2019-12-03 RX ADMIN — FOLIC ACID 1000 MCG: 1 TABLET ORAL at 08:24

## 2019-12-03 RX ADMIN — COLCHICINE 0.6 MG: 0.6 TABLET, FILM COATED ORAL at 08:24

## 2019-12-03 RX ADMIN — POLYETHYLENE GLYCOL 3350 17 G: 17 POWDER, FOR SOLUTION ORAL at 11:39

## 2019-12-03 RX ADMIN — ENOXAPARIN SODIUM 40 MG: 40 INJECTION SUBCUTANEOUS at 08:23

## 2019-12-03 RX ADMIN — SODIUM CHLORIDE AND POTASSIUM CHLORIDE 75 ML/HR: .9; .15 SOLUTION INTRAVENOUS at 22:29

## 2019-12-03 RX ADMIN — CEFAZOLIN SODIUM 2000 MG: 2 SOLUTION INTRAVENOUS at 11:31

## 2019-12-03 RX ADMIN — FAMOTIDINE 20 MG: 20 TABLET ORAL at 08:24

## 2019-12-03 RX ADMIN — ASPIRIN 325 MG ORAL TABLET 975 MG: 325 PILL ORAL at 08:24

## 2019-12-03 RX ADMIN — PSYLLIUM HUSK 1 PACKET: 3.4 POWDER ORAL at 08:24

## 2019-12-03 RX ADMIN — CEFAZOLIN SODIUM 2000 MG: 2 SOLUTION INTRAVENOUS at 18:22

## 2019-12-03 RX ADMIN — SODIUM CHLORIDE AND POTASSIUM CHLORIDE 75 ML/HR: .9; .15 SOLUTION INTRAVENOUS at 08:23

## 2019-12-03 NOTE — PROGRESS NOTES
Progress Note - Infectious Disease   Geetha Colon 75 y o  female MRN: [id number]  Unit/Bed#: [facility] 723-01 Encounter: [id number]      Impression:  1  LLE cellulitis in immunosuppressed host  2  RA on methotrexate and prednisone     Recommendations:  Patient had T-max of a 101 2° with less pain left lower extremity  ASO titer is negative  WBC count has decreased to 11 800  1  Would continue cefazolin 2 g q 8 hours IV,     Antibiotics:  1  Cefazolin 2 g q 8 hours IV, day 3 Rx    Subjective:  She still has some discomfort in her left lower extremity but says that the rash is less  Denies fevers, chills, or sweats  Denies nausea, vomiting, or diarrhea  Objective:  Vitals:  Temp:  [101 2 °F (38 4 °C)] 101 2 °F (38 4 °C)  HR:  [] 96  Resp:  [20] 20  BP: (115-135)/(69-85) 115/69  SpO2:  [98 %-99 %] 99 %  Temp (24hrs), Av 2 °F (38 4 °C), Min:101 2 °F (38 4 °C), Max:101 2 °F (38 4 °C)  Current: Temperature: (!) 101 2 °F (38 4 °C)    Physical Exam:     General Appearance:  Alert, nontoxic, no acute distress  Throat: Oropharynx moist without lesions  Lips, mucosa, and tongue normal   Neck: Supple, symmetrical, trachea midline, no adenopathy,  no tenderness/mass/nodules   Lungs:   Clear to auscultation bilaterally, no audible wheezes, rhonchi or rales; respirations unlabored   Heart:  Regular rate and rhythm, S1, S2 normal, no murmur, rub or gallop   Abdomen:   Soft, non-tender, non-distended, positive bowel sounds  No masses, no organomegaly    No CVA tenderness   Extremities: LLE with 2+ macular erythema that is tender and warm over the lower left leg    This thigh involvement is now gone   Skin: As above, surgical scars         Invasive Devices     Peripheral Intravenous Line            Peripheral IV 19 Right Forearm 2 days    Peripheral IV 19 Right Antecubital 1 day                Labs, Imaging, & Other studies:   All pertinent labs were personally reviewed  Results from last 7 days   Lab Units 12/02/19  0506 12/01/19  0511 11/30/19  1338   WBC Thousand/uL 11 80* 13 68* 18 10*   HEMOGLOBIN g/dL 9 9* 9 8* 12 0   PLATELETS Thousands/uL 209 202 261     Results from last 7 days   Lab Units 12/02/19  0506 12/01/19  1401 12/01/19  0511 11/30/19  1338   SODIUM mmol/L 131* 136 136 137   POTASSIUM mmol/L 3 6 3 6 3 6 4 8   CHLORIDE mmol/L 102 105 104 104   CO2 mmol/L 22 27 25 28   BUN mg/dL 9 13 16 15   CREATININE mg/dL 0 41* 0 64 0 50* 0 71   EGFR ml/min/1 73sq m 101 88 95 84   CALCIUM mg/dL 7 8* 8 0* 8 2* 9 3   AST U/L  --  24  --  53*   ALT U/L  --  23  --  32   ALK PHOS U/L  --  97  --  87     Results from last 7 days   Lab Units 11/30/19  1825 11/30/19  1747 11/30/19  1427   BLOOD CULTURE  No Growth at 24 hrs   No Growth at 24 hrs   --    URINE CULTURE   --   --  >100,000 cfu/ml Proteus mirabilis*   MRSA CULTURE ONLY  No Methicillin Resistant Staphlyococcus aureus (MRSA) isolated  --   --

## 2019-12-03 NOTE — PROGRESS NOTES
IM Residency Progress Note   Unit/Bed#: [facility] 723-01 Encounter: [id number]  SOD Team B       Geetha Colon 75 y o  female [id number]    Hospital Stay Days: 2      Assessment/Plan:    Principal Problem:    Sepsis ([facility] [address] [facility] )  Active Problems:    Per[facility] ([facility] )    Hypothyroidism    RBBB    Rheumatoid arthritis involving multiple sites with positive rheumatoid factor (HCC)    Malar rash    Cellulitis of left lower extremity    Abnormal EKG    Urinary tract infection due to Proteus    Myopericarditis    1  Sepsis - POA  - improving  -likely secondary to cellulitis now her urine is positive for P Mirabilis asymptomatic   patient presented with leukocytosis and tachycardia with suspicion for infection of lower extremity given pain, swelling and erythema likely secondary to strep cellulitis  No evidence of purulence, or crepitus  Patient received 1 g IV ceftriaxone in the ED  Currently afebrile and feels better  - x-ray ankle, foot, tib-fib of left extremity showed soft tissue swelling No fluid collection or gas  - blood culture x2 are negative preliminary  repeated blood culture for fever pending   - vancomycin stopped per ID / cefazolin 2 g Q 8 per ID   - IV fluids at 75cc/hour   - MRSA culture negative  - oxycodone for pain 2 5mg for severe pain and oxycodone 5mg for breakthrough pain  - Erythema improving in left lower ext    No crepitus noted on exam  Motor function in left foot and toes intact, pulses intact  -LRINEC score for nec fasc is 0, low risk, but will obtain stat CT left foot and leg showed same finding as Xray          2  Rheumatoid arthritis   - prednisone 3 mg a day  - methotrexate once a week every Monday-10mg  -LISA titer greater than 1280 anti histone pending   - check anti double-stranded LISA for lupus  - skin care plan noted per RN  - chest x-ray with atelectasis  - will order incentive spirometer with nursing communication distress adherence         3   Myopericarditis  · Trevin Colla chest pain, elevated troponin and C-reactive protein with EKG multiple ST elevation and UT depression discussed with Cardiology  · On exam pericardial rub  · Consulted Cardiology appreciate recommendation  · Echo with normal EF 60% no regional wall motion abnormality there is mild systolic anterior motion of the anterior leaflet of the mitral valve with trace regurgitation  There is no pericardial effusion  · High dose aspirin 975 mg t i d  · Colchicine 0 6 mg twice a day  · Pepcid 20 mg b i d  To prevent peptic ulcer disease while on high-dose aspirin  · Discussed above plan with Cardiology and family over the phone-Mary Beth her granddaughter at [phone] as requested by patient                   4  Malar rash - rash does appear to spare the nasal labial fold now improving   - in a significantly elevated more than 12,00  - will check anti double stranded DNA and anti histone bending        5  Hypothyroidism  - continue levothyroxine      6  UTI due to P  Mirabilis  · POA likely colonized given being asymptomatic  · Already on Cefazolin 2 g as above   · Sensitive to cefazolin      7  Left foot acquired deformity  · Likely due to rheumatoid arthritis  · Consulted podiatric who did bedside    8  Constipation  · Likely due to opioid  · Will try bisacodyl suppository p r n  And MiraLax  · If no improvement will try Relistor      9  RBBB on EKG      10  Hypokalemia  · Potassium 3 replace with 40 KCL  · Check magnesium  · Continue IV saline with 20 KCL  · Follow-up BMP daily      Disposition: Continue inpatient while needing IV antibiotics and awaiting PT/OT eval         Subjective:   Patient seen and examined at bedside this morning  Yesterday she had fever 101 2 F repeat blood culture was sent, she has chest pain diagnosed as myopericarditis  This morning she denied chest pain, nausea, shortness of breath a reported feeling better but she is still constipated         Vitals: Temp (24hrs), Av 9 °F (37 2 °C), Min:97 5 °F (36 4 °C), Max:101 2 °F (38 4 °C)  Current: Temperature: 97 5 °F (36 4 °C)  Vitals:    12/02/19 2100 12/02/19 2101 12/02/19 2200 12/03/19 0740   BP:   97/62 94/81   Pulse:    77   Resp:   17 19   Temp:  98 °F (36 7 °C) 99 °F (37 2 °C) 97 5 °F (36 4 °C)   TempSrc:  Oral     SpO2: 100%   (!) 84%   Weight:       Height:        Body mass index is 19 53 kg/m²  I/O last 24 hours: In: 1572 5 [P O :360; I V :1212 5]  Out: 2200 [Urine:2200]      Physical Exam: /79   Pulse 87   Temp 97 5 °F (36 4 °C)   Resp 19   Ht 5' (1 524 m)   Wt 45 4 kg (100 lb)   SpO2 97%   BMI 19 53 kg/m²   General appearance: alert and oriented, in no acute distress  Eyes: Non icterus  Throat: Moist mucous membranes  Neck: no adenopathy  Lungs: clear to auscultation bilaterally  Heart: S1, S2 normal and There is pericardial rub there is soft at the left lower sternal border  Abdomen: soft, non-tender; bowel sounds normal; no masses,  no organomegaly  Extremities: Left lower extremity erythema improved compared to yesterday, left foot with dressing for small 5th digit ulcer  Neurologic:  Alert and oriented, fluent speech, no obvious deficit  Invasive Devices     Peripheral Intravenous Line            Peripheral IV 11/30/19 Right Forearm 2 days    Peripheral IV 12/01/19 Right Antecubital 1 day                          Labs:   Recent Results (from the past 24 hour(s))   ECG 12 lead    Collection Time: 12/02/19  3:34 PM   Result Value Ref Range    Ventricular Rate 105 BPM    Atrial Rate 105 BPM    WV Interval 124 ms    QRSD Interval 120 ms    QT Interval 354 ms    QTC Interval 467 ms    P Axis 74 degrees    QRS Axis 66 degrees    T Wave Tavernier 59 degrees   Vancomycin, trough Draw level peripherally  Give dose immediately after trough (do NOT hold dose)  Call Pharmacy with any questions/concerns (Pharm Consult)      Collection Time: 12/02/19  3:41 PM   Result Value Ref Range    Vancomycin Tr 1 3 (L) 10 0 - 20 0 ug/mL Troponin I    Collection Time: 12/02/19  3:41 PM   Result Value Ref Range    Troponin I 0 83 (H) <=0 04 ng/mL   ECG 12 lead    Collection Time: 12/02/19  4:55 PM   Result Value Ref Range    Ventricular Rate 105 BPM    Atrial Rate 105 BPM    NH Interval 114 ms    QRSD Interval 118 ms    QT Interval 338 ms    QTC Interval 446 ms    P Axis 70 degrees    QRS Axis 76 degrees    T Wave Axis 63 degrees   Blood culture    Collection Time: 12/02/19  5:16 PM   Result Value Ref Range    Blood Culture Received in Microbiology Lab  Culture in Progress  Blood culture    Collection Time: 12/02/19  5:16 PM   Result Value Ref Range    Blood Culture Received in Microbiology Lab  Culture in Progress      Troponin I    Collection Time: 12/02/19  6:40 PM   Result Value Ref Range    Troponin I 0 88 (H) <=0 04 ng/mL   Troponin I    Collection Time: 12/02/19 10:01 PM   Result Value Ref Range    Troponin I 0 75 (H) <=0 04 ng/mL   CBC and differential    Collection Time: 12/03/19  4:30 AM   Result Value Ref Range    WBC 10 51 (H) 4 31 - 10 16 Thousand/uL    RBC 3 45 (L) 3 81 - 5 12 Million/uL    Hemoglobin 10 0 (L) 11 5 - 15 4 g/dL    Hematocrit 32 6 (L) 34 8 - 46 1 %    MCV 95 82 - 98 fL    MCH 29 0 26 8 - 34 3 pg    MCHC 30 7 (L) 31 4 - 37 4 g/dL    RDW 14 6 11 6 - 15 1 %    MPV 9 4 8 9 - 12 7 fL    Platelets 512 175 - 438 Thousands/uL    nRBC 0 /100 WBCs    Neutrophils Relative 85 (H) 43 - 75 %    Immat GRANS % 1 0 - 2 %    Lymphocytes Relative 6 (L) 14 - 44 %    Monocytes Relative 7 4 - 12 %    Eosinophils Relative 1 0 - 6 %    Basophils Relative 0 0 - 1 %    Neutrophils Absolute 8 94 (H) 1 85 - 7 62 Thousands/µL    Immature Grans Absolute 0 10 0 00 - 0 20 Thousand/uL    Lymphocytes Absolute 0 64 0 60 - 4 47 Thousands/µL    Monocytes Absolute 0 76 0 17 - 1 22 Thousand/µL    Eosinophils Absolute 0 05 0 00 - 0 61 Thousand/µL    Basophils Absolute 0 02 0 00 - 0 10 Thousands/µL   Basic metabolic panel    Collection Time: 12/03/19  4:30 AM   Result Value Ref Range    Sodium 137 136 - 145 mmol/L    Potassium 3 4 (L) 3 5 - 5 3 mmol/L    Chloride 108 100 - 108 mmol/L    CO2 25 21 - 32 mmol/L    ANION GAP 4 4 - 13 mmol/L    BUN 8 5 - 25 mg/dL    Creatinine 0 52 (L) 0 60 - 1 30 mg/dL    Glucose 100 65 - 140 mg/dL    Calcium 7 8 (L) 8 3 - 10 1 mg/dL    eGFR 94 ml/min/1 73sq m       Radiology Results: I have personally reviewed pertinent reports  Other Diagnostic Testing:   I have personally reviewed pertinent reports          Active Meds:   Current Facility-Administered Medications   Medication Dose Route Frequency    acetaminophen (TYLENOL) tablet 650 mg  650 mg Oral Q6H PRN    aspirin tablet 975 mg  975 mg Oral TID    ceFAZolin (ANCEF) IVPB (premix) 2,000 mg  2,000 mg Intravenous Q8H    colchicine (COLCRYS) tablet 0 6 mg  0 6 mg Oral BID    docusate sodium (COLACE) capsule 100 mg  100 mg Oral BID    enoxaparin (LOVENOX) subcutaneous injection 40 mg  40 mg Subcutaneous Daily    famotidine (PEPCID) tablet 20 mg  20 mg Oral BID    folic acid (FOLVITE) tablet 1,000 mcg  1,000 mcg Oral Daily    gabapentin (NEURONTIN) capsule 300 mg  300 mg Oral TID    levothyroxine tablet 25 mcg  25 mcg Oral Early Morning    methotrexate tablet 10 mg  10 mg Oral Weekly    nitroglycerin (NITROSTAT) SL tablet 0 4 mg  0 4 mg Sublingual Q5 Min PRN    nortriptyline (PAMELOR) capsule 50 mg  50 mg Oral HS    oxyCODONE (ROXICODONE) IR tablet 2 5 mg  2 5 mg Oral Q4H PRN    oxyCODONE (ROXICODONE) IR tablet 5 mg  5 mg Oral Q4H PRN    potassium chloride (K-DUR,KLOR-CON) CR tablet 40 mEq  40 mEq Oral Once    predniSONE tablet 3 mg  3 mg Oral Daily    psyllium (METAMUCIL) 1 packet  1 packet Oral TID    sodium chloride 0 9 % with KCl 20 mEq/L infusion (premix)  75 mL/hr Intravenous Continuous         VTE Pharmacologic Prophylaxis: Enoxaparin (Lovenox)  VTE Mechanical Prophylaxis: sequential compression device    Solitario Lind MD

## 2019-12-03 NOTE — PLAN OF CARE
Problem: PHYSICAL THERAPY ADULT  Goal: Performs mobility at highest level of function for planned discharge setting  See evaluation for individualized goals  Description  Treatment/Interventions: Functional transfer training, LE strengthening/ROM, Elevations, Therapeutic exercise, Endurance training, Bed mobility, Gait training, Spoke to nursing, Spoke to case management  Equipment Recommended: Mat Prader       See flowsheet documentation for full assessment, interventions and recommendations  Note:   Prognosis: Fair  Problem List: Decreased strength, Decreased range of motion, Decreased endurance, Impaired balance, Decreased mobility, Decreased cognition, Impaired judgement, Decreased safety awareness, Pain  Assessment: Pt is a 74 y/o female who is seen for high complexity PT evaluation  Pt presented to [address] [place] [address] on 11/30/2019 from home with c/o (L) LE redness, swelling, facial swelling since last Wednesday w/dizziness  Pt diagnosed with (L) LE cellulitis along with sepsis likely secondary to the cellulitis  Pt presented with symptoms of tachycardia, chills, and positional lightheadedness  Pt co-morbidities/PMHx consist of RA, hypothyroidism, polio, PVD, RBBB, and h/o of similar rash  Pt currently lives in a two story home with her sister  Pt reports that they take care of each other  PTA, pt reports that she was independent with all ADLs, IADLs, and ambulation  She reports that she would be able to retrieve assistance from family if needed  Additionally, pt drives without any difficulty  Pt ambulates with a RW at all times  Pt cleared with nursing to mobilize and perform OOB activity prior to evaluation  Upon evaluation, pt received reclined in bed with bed alarm activated  Pt confused at time of evaluation, reporting "Are we at my sister's house?" Pt A&O x 3; disoriented to place  Pt understands why she is here, but requires increased processing time to respond to commands and questions   From a mobility standpoint, pt required mod A x 1 for bed mobility, transfers, and ambulation tasks  She required hand held assist and management of LEs to sit at EOB  Pt experienced dizziness with all positional changes, but was able to tolerate throughout the session  Initially, pt performed transfer without RW from bed to chair  However, pt reported that she uses the RW for all activity  Pt able to perform transfers and ambulate w/RW for 5'  However, she required multiple trials to get out of chair at mod A x 1  When asked if this is her normal/baseline, pt reported "this is how I usually am  I usually do everything by myself "  To conclude therapy session, pt able to return to chair with chair alarm activated and needs left within reach  Nursing available to assist her afterwards  Pt demonstrates notable deficits in her strength, cognition, balance, mobility, judgment, and safety awareness  Pt may benefit from acute skilled PT until medically cleared for D/C  Note unstable/unpredictable clinical picture due to limited mobility compared to patient's baseline, presentation as a fall risk, cognitive deficits, and co-morbidities/PMHx  Recommend that pt be D/C to rehab once medically cleared  Recommendation: (S) Post acute IP rehab     PT - OK to Discharge: Yes(to rehab once medically cleared)    See flowsheet documentation for full assessment

## 2019-12-03 NOTE — CONSULTS
CARDIOLOGY PROGRESS NOTE     PATIENT INFORMATION     Name: Pauline Carrillo   Age & Sex: 75 y o  female   MRN: [id number]  Hospital Stay Days: 2  Unit/Bed#: [facility] 723-01   Encounter: [id number]    ASSESSMENT/PLAN     Principal Problem:    Sepsis ([facility] [address] )  Active Problems:    Peripheral vascular disease ([facility] [address] )    Hypothyroidism    RBBB    Rheumatoid arthritis involving multiple sites with positive rheumatoid factor (HCC)    Malar rash    Cellulitis of left lower extremity    Abnormal EKG    Urinary tract infection due to Proteus    Myopericarditis    Acute Myopericarditis likely 2/2 to Autoimmune etiology   Meets  At least 2 diagnostic clinical criteria including:  Pericardial friction rub come EKG changes with ST segment elevations, depressions diffusely, as well as elevated troponin in the setting of significant history of RA and hypothyroidism, As well as the absence for any other cause  -On exam today, Patient admits that chest pain has resolved since last night  Patient denies symptoms of heartburn/ acid reflux  Patient has never experienced this before  -on exam: friction rub very softly auscultated; no chest tenderness to palpation, clear breasts bilaterally  -troponin down trendin 88--> 0 75 today  -negative antistreptolysin O Ab, lactic acid, CK-MB  -Echo negative for pleural effusion, preserved EF of 60%  -pending Lyme serology with reflex Western blot  -anti histone antibody pending  -EKG findings with possible p r  Depressions and ST changes however similar to prior EKGs in 2017  -however in the setting of elevated CRP of greater than 90, will continue empiric treatment with aspirin 975 t i d   And colchicine 0 6 daily, nitroglycerin p r n ,   -Pepcid 40mg daily indicated to prevent GI side effects of medications   -patient should continue Aspirin for 1 month   -continue Colchicine for a total of 3 months   -Can CRP for treatment effectivity in the future however may be chronically elevated due to RA/ Hypothyroidism    Sepsis 2/2 left lower extremity/ facial cellulitis  -focal site of possible source located on dorsal aspect of left foot, podiatry did debride the area yesterday 12/02/2019 however was limited due to pain, dressed  -patient admits to improvement, swelling area of erythema has been reduced  -vitals improved; afebrile 97 5F, improved WBC 10 5 today  -negative blood cultures x2 at 48 hours, negative MRSA culture  -continue cefazolin 2 g IV Q 8 per ID recommendations; input appreciated  -monitor symptomatic improvement    Positive Proteus mirabilis uncomplicated cystitis  Patient has a history of Proteus mirabilis UTI in the recent past (July 2019); currently complaining of pain on urination as well as suprapubic tenderness to palpation  -UA positive for nitrates, WBCs protein and red blood cells  -culture show Proteus mirabilis supple to Cefazolin  -continue with 2g IV Cefazolin  -ID recs appreciated    Other medical problems per primary team  SUBJECTIVE     Patient is a 74yo F with a sig PMH of hypothyroidism, rheumatoid arthritis (on prednisone and methotrexate for the last 3 years), peripheral vascular disease, anxiety presents with complaints of facial and left lower extremity swelling and erythema along with fevers chills  Patient admits that in July of 2019 she had a similar episode of facial swelling which was treated with 5 day course of antibiotics which resolved swelling  Patient denies any insect bites, trauma for wants to any body surfaces  She admits that she had a small callus or wound on the dorsal aspect of left lower foot in which leg appears to be a cellulitis that began to spread upwards from foot along the anterior aspect of leg  Patient states that she has been compliant with medications for rheumatoid arthritis including 3 g of prednisone and methotrexate 10 mg daily  Patient was also treated for Proteus mirabilis UTI during last ED visit in July of 2019      Patient had an episode (which she has never experienced before) of non exertional chest pain which was pleuritic and localized anterior chest wall, rated severity of 5/10 while resting in bed yesterday 12/02/19  Patient could not delineate if it was worsened while in a supine position versus in the upright leaning forward position  EKG and troponins suggestive of Myopericarditis with elevated troponin of 0 88 (max) as well as diffuse ST segment elevations with SD depression compared to baseline EKG from 2017  Patient's chest pain has resolved, and has not recurred since the night of 12/02/2019  In the clinical setting of auscultation of a friction rub as well as significant autoimmune history with pleuritic chest pain; myopericarditis is likely  Echo was performed, which did not show pericardial fluid; EF of 60% w/o concentric hypertrophy  Patient was started on 975 mg of aspirin t i d  Colchicine 0 6 mg daily  Recommend aspirin of 975 mg t i d  For 1 month in colchicine 0 6 mg for 3 months  Spoke spoke to patient regarding adverse effects of medications as well as the importance of continuing medications despite alleviation of symptoms  Patient understands  Will speak to family members/ daughter per request from the patient  Review of Systems   Constitution: Negative for chills, fever, malaise/fatigue and night sweats  HENT: Negative  Eyes: Negative  Cardiovascular: Negative  Negative for chest pain, dyspnea on exertion, irregular heartbeat, orthopnea, palpitations and paroxysmal nocturnal dyspnea  Respiratory: Negative  Endocrine: Negative  Hematologic/Lymphatic: Negative  Skin: Positive for rash  Musculoskeletal: Negative  Gastrointestinal: Negative  Negative for bloating, change in bowel habit, constipation, heartburn, nausea and vomiting  Genitourinary: Positive for dysuria  Suprapubic pain during urination   Neurological: Negative  Psychiatric/Behavioral: Negative  Allergic/Immunologic: Negative  OBJECTIVE     Vitals:    19 2100 19 2101 19 2200 19 0740   BP:   97/62 94/81   Pulse:    77   Resp:      Temp:  98 °F (36 7 °C) 99 °F (37 2 °C) 97 5 °F (36 4 °C)   TempSrc:  Oral     SpO2: 100%   (!) 84%   Weight:       Height:          Temperature:   Temp (24hrs), Av 9 °F (37 2 °C), Min:97 5 °F (36 4 °C), Max:101 2 °F (38 4 °C)    Temperature: 97 5 °F (36 4 °C)  Intake & Output:  I/O       701 -  07 -  07 07 -  0700    P  O  240 120 240    I V  (mL/kg) 2208 8 (48 7) 1062 5 (23 4) 150 (3 3)    IV Piggyback 70      Total Intake(mL/kg) 2518 8 (55 5) 1182 5 (26) 390 (8 6)    Urine (mL/kg/hr) 700 (0 6) 2200 (2)     Total Output 700 2200     Net +1818 8 -1017 5 +390           Unmeasured Urine Occurrence  1 x         Weights:   IBW: 45 5 kg    Body mass index is 19 53 kg/m²  Weight (last 2 days)     None        Physical Exam   Constitutional: She is oriented to person, place, and time  She appears well-developed and well-nourished  No distress  HENT:   Head: Normocephalic and atraumatic  Right Ear: External ear normal    Left Ear: External ear normal    Nose: Nose normal    Mouth/Throat: Oropharynx is clear and moist  No oropharyngeal exudate  Eyes: Pupils are equal, round, and reactive to light  Conjunctivae and EOM are normal  Right eye exhibits no discharge  Left eye exhibits no discharge  Neck: Normal range of motion  Neck supple  No JVD present  No tracheal deviation present  No thyromegaly present  Cardiovascular: Normal rate, regular rhythm and intact distal pulses  Exam reveals friction rub (mild solft anterior friction rub)  No murmur heard  Pulmonary/Chest: Effort normal and breath sounds normal  No stridor  No respiratory distress  She has no wheezes  Abdominal: Soft  Bowel sounds are normal  She exhibits no distension and no mass  There is no tenderness  There is no guarding  Suprapubic tenderness to palpation elicited   Musculoskeletal: Normal range of motion  She exhibits no edema, tenderness or deformity  Neurological: She is alert and oriented to person, place, and time  She displays normal reflexes  No cranial nerve deficit  Coordination normal    Skin: Skin is warm and dry  Capillary refill takes less than 2 seconds  Rash noted  She is not diaphoretic  There is erythema  No pallor  LLE improved cellulitis; area of erythema/ warmth, no purulence of fluctuance; area reduced than on initial presentation  Dorsal left foot with foci of debrided wound wrapped     Psychiatric: She has a normal mood and affect  Her behavior is normal  Judgment and thought content normal    Nursing note and vitals reviewed  LABORATORY DATA     Labs: I have personally reviewed pertinent reports  Results from last 7 days   Lab Units 12/03/19  0430 12/02/19  0506 12/01/19  0511   WBC Thousand/uL 10 51* 11 80* 13 68*   HEMOGLOBIN g/dL 10 0* 9 9* 9 8*   HEMATOCRIT % 32 6* 32 6* 30 9*   PLATELETS Thousands/uL 241 209 202   NEUTROS PCT % 85* 84* 85*   MONOS PCT % 7 8 8      Results from last 7 days   Lab Units 12/03/19  0430 12/02/19  0506 12/01/19  1401  11/30/19  1338   POTASSIUM mmol/L 3 4* 3 6 3 6   < > 4 8   CHLORIDE mmol/L 108 102 105   < > 104   CO2 mmol/L 25 22 27   < > 28   BUN mg/dL 8 9 13   < > 15   CREATININE mg/dL 0 52* 0 41* 0 64   < > 0 71   CALCIUM mg/dL 7 8* 7 8* 8 0*   < > 9 3   ALK PHOS U/L  --   --  97  --  87   ALT U/L  --   --  23  --  32   AST U/L  --   --  24  --  53*    < > = values in this interval not displayed  Results from last 7 days   Lab Units 12/01/19  1401   LACTIC ACID mmol/L 1 9     Results from last 7 days   Lab Units 12/02/19  2201 12/02/19  1840 12/02/19  1541   TROPONIN I ng/mL 0 75* 0 88* 0 83*       IMAGING & DIAGNOSTIC TESTING     Radiology Results: I have personally reviewed pertinent reports      Xr Chest Portable    Result Date: 12/3/2019  Impression: Mild bibasilar opacity, likely atelectasis  Pneumonia cannot be excluded  Workstation performed: [id number]     Xr Tibia Fibula 2 Vw Left    Result Date: 12/2/2019  Impression: No acute osseous abnormality  Soft tissue swelling as above  Osteopenia  Workstation performed: [id number]     Xr Foot 3+ Vw Left    Result Date: 12/2/2019  Impression: No acute osseous abnormality  Workstation performed: [id number]     Ct Tibia Fibula Left W Contrast    Result Date: 12/1/2019  Impression: Soft tissue swelling without organized collection or evidence of osteomyelitis Workstation performed: [id number]     Ct Foot Left W Contrast    Result Date: 12/1/2019  Impression: 1  Ulceration noted plantar to the 5th metatarsal head, without evidence of osteomyelitis 2  Soft tissue swelling without organized collection Workstation performed: [id number]     Other Diagnostic Testing: I have personally reviewed pertinent reports        ACTIVE MEDICATIONS     Current Facility-Administered Medications   Medication Dose Route Frequency    acetaminophen (TYLENOL) tablet 650 mg  650 mg Oral Q6H PRN    aspirin tablet 975 mg  975 mg Oral TID    ceFAZolin (ANCEF) IVPB (premix) 2,000 mg  2,000 mg Intravenous Q8H    colchicine (COLCRYS) tablet 0 6 mg  0 6 mg Oral BID    docusate sodium (COLACE) capsule 100 mg  100 mg Oral BID    enoxaparin (LOVENOX) subcutaneous injection 40 mg  40 mg Subcutaneous Daily    famotidine (PEPCID) tablet 20 mg  20 mg Oral BID    folic acid (FOLVITE) tablet 1,000 mcg  1,000 mcg Oral Daily    gabapentin (NEURONTIN) capsule 300 mg  300 mg Oral TID    levothyroxine tablet 25 mcg  25 mcg Oral Early Morning    methotrexate tablet 10 mg  10 mg Oral Weekly    nitroglycerin (NITROSTAT) SL tablet 0 4 mg  0 4 mg Sublingual Q5 Min PRN    nortriptyline (PAMELOR) capsule 50 mg  50 mg Oral HS    oxyCODONE (ROXICODONE) IR tablet 2 5 mg  2 5 mg Oral Q4H PRN    oxyCODONE (ROXICODONE) IR tablet 5 mg  5 mg Oral Q4H PRN    predniSONE tablet 3 mg  3 mg Oral Daily    psyllium (METAMUCIL) 1 packet  1 packet Oral TID    sodium chloride 0 9 % with KCl 20 mEq/L infusion (premix)  75 mL/hr Intravenous Continuous     VTE Pharmacologic Prophylaxis: Enoxaparin (Lovenox)  VTE Mechanical Prophylaxis: Ambulating   ==  Rekha Perez MD  Resident, PGY-1  [address] [facility]

## 2019-12-03 NOTE — PLAN OF CARE
Problem: Potential for Falls  Goal: Patient will remain free of falls  Description  INTERVENTIONS:  - Assess patient frequently for physical needs  -  Identify cognitive and physical deficits and behaviors that affect risk of falls  -  [place] fall precautions as indicated by assessment   - Educate patient/family on patient safety including physical limitations  - Instruct patient to call for assistance with activity based on assessment  - Modify environment to reduce risk of injury  - Consider OT/PT consult to assist with strengthening/mobility  Outcome: Progressing     Problem: Prexisting or High Potential for Compromised Skin Integrity  Goal: Skin integrity is maintained or improved  Description  INTERVENTIONS:  - Identify patients at risk for skin breakdown  - Assess and monitor skin integrity  - Assess and monitor nutrition and hydration status  - Monitor labs   - Assess for incontinence   - Turn and reposition patient  - Assist with mobility/ambulation  - Relieve pressure over bony prominences  - Avoid friction and shearing  - Provide appropriate hygiene as needed including keeping skin clean and dry  - Evaluate need for skin moisturizer/barrier cream  - Collaborate with interdisciplinary team   - Patient/family teaching  - Consider wound care consult   Outcome: Progressing     Problem: Nutrition/Hydration-ADULT  Goal: Nutrient/Hydration intake appropriate for improving, restoring or maintaining nutritional needs  Description  Monitor and assess patient's nutrition/hydration status for malnutrition  Collaborate with interdisciplinary team and initiate plan and interventions as ordered  Monitor patient's weight and dietary intake as ordered or per policy  Utilize nutrition screening tool and intervene as necessary  Determine patient's food preferences and provide high-protein, high-caloric foods as appropriate       INTERVENTIONS:  - Monitor oral intake, urinary output, labs, and treatment plans  - Assess nutrition and hydration status and recommend course of action  - Evaluate amount of meals eaten  - Assist patient with eating if necessary   - Allow adequate time for meals  - Recommend/ encourage appropriate diets, oral nutritional supplements, and vitamin/mineral supplements  - Order, calculate, and assess calorie counts as needed  - Recommend, monitor, and adjust tube feedings and TPN/PPN based on assessed needs  - Assess need for intravenous fluids  - Provide specific nutrition/hydration education as appropriate  - Include patient/family/caregiver in decisions related to nutrition  Outcome: Progressing     Problem: PAIN - ADULT  Goal: Verbalizes/displays adequate comfort level or baseline comfort level  Description  Interventions:  - Encourage patient to monitor pain and request assistance  - Assess pain using appropriate pain scale  - Administer analgesics based on type and severity of pain and evaluate response  - Implement non-pharmacological measures as appropriate and evaluate response  - Consider cultural and social influences on pain and pain management  - Notify physician/advanced practitioner if interventions unsuccessful or patient reports new pain  Outcome: Progressing     Problem: INFECTION - ADULT  Goal: Absence or prevention of progression during hospitalization  Description  INTERVENTIONS:  - Assess and monitor for signs and symptoms of infection  - Monitor lab/diagnostic results  - Monitor all insertion sites, i e  indwelling lines, tubes, and drains  - Monitor endotracheal if appropriate and nasal secretions for changes in amount and color  - [place] appropriate cooling/warming therapies per order  - Administer medications as ordered  - Instruct and encourage patient and family to use good hand hygiene technique  - Identify and instruct in appropriate isolation precautions for identified infection/condition  Outcome: Progressing  Goal: Absence of fever/infection during neutropenic period  Description  INTERVENTIONS:  - Monitor WBC    Outcome: Progressing     Problem: SAFETY ADULT  Goal: Maintain or return to baseline ADL function  Description  INTERVENTIONS:  -  Assess patient's ability to carry out ADLs; assess patient's baseline for ADL function and identify physical deficits which impact ability to perform ADLs (bathing, care of mouth/teeth, toileting, grooming, dressing, etc )  - Assess/evaluate cause of self-care deficits   - Assess range of motion  - Assess patient's mobility; develop plan if impaired  - Assess patient's need for assistive devices and provide as appropriate  - Encourage maximum independence but intervene and supervise when necessary  - Involve family in performance of ADLs  - Assess for home care needs following discharge   - Consider OT consult to assist with ADL evaluation and planning for discharge  - Provide patient education as appropriate  Outcome: Progressing  Goal: Maintain or return mobility status to optimal level  Description  INTERVENTIONS:  - Assess patient's baseline mobility status (ambulation, transfers, stairs, etc )    - Identify cognitive and physical deficits and behaviors that affect mobility  - Identify mobility aids required to assist with transfers and/or ambulation (gait belt, sit-to-stand, lift, walker, cane, etc )  - [place] fall precautions as indicated by assessment  - Record patient progress and toleration of activity level on Mobility SBAR; progress patient to next Phase/Stage  - Instruct patient to call for assistance with activity based on assessment  - Consider rehabilitation consult to assist with strengthening/weightbearing, etc   Outcome: Progressing     Problem: DISCHARGE PLANNING  Goal: Discharge to home or other facility with appropriate resources  Description  INTERVENTIONS:  - Identify barriers to discharge w/patient and caregiver  - Arrange for needed discharge resources and transportation as appropriate  - Identify discharge learning needs (meds, wound care, etc )  - Arrange for interpretive services to assist at discharge as needed  - Refer to Case Management Department for coordinating discharge planning if the patient needs post-hospital services based on physician/advanced practitioner order or complex needs related to functional status, cognitive ability, or social support system  Outcome: Progressing     Problem: Knowledge Deficit  Goal: Patient/family/caregiver demonstrates understanding of disease process, treatment plan, medications, and discharge instructions  Description  Complete learning assessment and assess knowledge base    Interventions:  - Provide teaching at level of understanding  - Provide teaching via preferred learning methods  Outcome: Progressing

## 2019-12-03 NOTE — PROGRESS NOTES
INTERNAL MEDICINE PROGRESS NOTE     Name: Joanna Pearson   Age & Sex: 75 y o  female   MRN: [id number]  [facility]   Encounter: [id number]  Team: SOD Team B     PATIENT INFORMATION     Name: Joanna Pearson   Age & Sex: 75 y o  female   MRN: [id number]  Hospital Stay Days: 2    ASSESSMENT/PLAN     Principal Problem:    Sepsis ([facility] [address] [address] )  Active Problems:    Peripheral vascular disease ([facility] )    Hypothyroidism    RBBB    Rheumatoid arthritis involving multiple sites with positive rheumatoid factor (HCC)    Malar rash    Cellulitis of left lower extremity    Abnormal EKG    Urinary tract infection due to Proteus    Myopericarditis    Drug-induced constipation      1  Sepsis: Patient presented to ED on 11/30 with left lower extremity edema, erythema, and stabbing pain following shaving a "corn" off bottom of her feet  Patient also had leukocytosis and tachycardia at admission  Patient initially receiving vancomycin as inpatient which has since been discontinued and switched to cefazolin per ID due to negative MRSA culture  Today, less swelling, erythema, and pain in left lower extremity  Tmax of 101 2 yesterday at 16:04, afebrile since  WBC trending down to 10 51 from 11 8 yesterday  Blood cultures negative X2  Plan:  - Will continue cefazolin 2g [id number] per ID  - Oxycodone 2 5mg for severe pain and oxycodone 5mg for breakthrough pain    2  Chest Pain: Patient reported chest pain yesterday afternoon and was evaluated at bedside at the time  EKG ordered which showed sinus tachycardia, RBBB, and non-specific ST an T wave abnormalities  Troponin elevated X3 yesterday at 0 83--> 0 88 --> 0 75  Patient likely to have myopericarditis and was started on ASA and colchicine  Patient denies chest pain since yesterday afternoon  Cardiac exam benign with normal heart sounds, no murmur, no friction rub, and normal S1 and S2      - Cardiology consult placed  - CXR ordered and showed bibasilar opacity, likely atelectasis  Pneumonia could not be excluded  - unlikely based off of lack of cough and clear air fields on pulmonary exam  - Will start ASA and colchicine for myopericarditis  - Continue Pepcid for PUD prophylaxis due to ASA administration    3  Constipation: Patient is normally constipated going the bathroom every 2 days  She has not gone over the last two days despite psyllium and docusate  Will add miralax  4  Rheumatoid Arthritis: Patient with history of RA treated with methotrexate and prednisone  Patient has malar rash  LISA was positive  Will attain anti-smith to rule out SLE and continue current medications  SUBJECTIVE     Patient seen and examined  No acute events overnight  Patient reports no episodes of chest pain since yesterday afternoon and less pain in her left lower extremity  She thinks the swelling and redness has also gone down since yesterday  She slept well, but reports constipation since   The patient states that she is normally constipated and takes a laxative at home  She denies urinary symptoms, shortness of breath, chills, and abdominal pain  OBJECTIVE     Vitals:    19 2101 19 2200 19 0740 19 1140   BP:  97/62 94/81 106/79   Pulse:   77 87   Resp:  17 19    Temp: 98 °F (36 7 °C) 99 °F (37 2 °C) 97 5 °F (36 4 °C)    TempSrc: Oral      SpO2:   (!) 84% 97%   Weight:       Height:          Temperature:   Temp (24hrs), Av 9 °F (37 2 °C), Min:97 5 °F (36 4 °C), Max:101 2 °F (38 4 °C)    Temperature: 97 5 °F (36 4 °C)  Intake & Output:  I/O        07 -  0700  07 -  07 07 -  0700    P  O  240 120 240    I V  (mL/kg) 2208 8 (48 7) 1062 5 (23 4) 150 (3 3)    IV Piggyback 70      Total Intake(mL/kg) 2518 8 (55 5) 1182 5 (26) 390 (8 6)    Urine (mL/kg/hr) 700 (0 6) 2200 (2)     Total Output 700 2200     Net +1818 8 -1017 5 +390           Unmeasured Urine Occurrence  1 x         Weights:   IBW: 45 5 kg    Body mass index is 19 53 kg/m²  Weight (last 2 days)     None        Physical Exam   Constitutional: She is oriented to person, place, and time  She is cooperative  She is easily aroused  Non-toxic appearance  She does not have a sickly appearance  No distress  HENT:   Mouth/Throat: Uvula is midline  No oropharyngeal exudate, posterior oropharyngeal edema or posterior oropharyngeal erythema  Malar rash less erythematous since yesterday  Eyes: Pupils are equal, round, and reactive to light  EOM and lids are normal  Right eye exhibits no discharge and no exudate  Left eye exhibits no discharge and no exudate  Right conjunctiva is not injected  Left conjunctiva is not injected  Cardiovascular: Normal rate, regular rhythm, S1 normal, S2 normal and normal heart sounds  Exam reveals no gallop, no S3, no S4 and no friction rub  No murmur heard  Pulmonary/Chest: Effort normal and breath sounds normal  No accessory muscle usage or stridor  No respiratory distress  She has no decreased breath sounds  She has no wheezes  She has no rhonchi  She has no rales  Abdominal: Soft  Normal appearance and bowel sounds are normal  She exhibits no distension  There is no tenderness  There is no rigidity, no rebound and no guarding  Musculoskeletal:        Left foot: There is deformity  No lower extremity edema bilaterally  Erythema on left lower extremity regressed since yesterday  No tenderness to palpation  Feet:   Left Foot:   Skin Integrity: Positive for ulcer and erythema  Neurological: She is alert, oriented to person, place, and time and easily aroused  No cranial nerve deficit or sensory deficit  LABORATORY DATA     Labs: I have personally reviewed pertinent reports    Results from last 7 days   Lab Units 12/03/19  0430 12/02/19  0506 12/01/19  0511   WBC Thousand/uL 10 51* 11 80* 13 68*   HEMOGLOBIN g/dL 10 0* 9 9* 9 8*   HEMATOCRIT % 32 6* 32 6* 30 9*   PLATELETS Thousands/uL 241 209 202   NEUTROS PCT % 85* 84* 85*   MONOS PCT % 7 8 8      Results from last 7 days   Lab Units 12/03/19  0430 12/02/19  0506 12/01/19  1401  11/30/19  1338   POTASSIUM mmol/L 3 4* 3 6 3 6   < > 4 8   CHLORIDE mmol/L 108 102 105   < > 104   CO2 mmol/L 25 22 27   < > 28   BUN mg/dL 8 9 13   < > 15   CREATININE mg/dL 0 52* 0 41* 0 64   < > 0 71   CALCIUM mg/dL 7 8* 7 8* 8 0*   < > 9 3   ALK PHOS U/L  --   --  97  --  87   ALT U/L  --   --  23  --  32   AST U/L  --   --  24  --  53*    < > = values in this interval not displayed  Results from last 7 days   Lab Units 12/03/19  0430   MAGNESIUM mg/dL 2 4              Results from last 7 days   Lab Units 12/01/19  1401   LACTIC ACID mmol/L 1 9     Results from last 7 days   Lab Units 12/02/19  2201 12/02/19  1840 12/02/19  1541   TROPONIN I ng/mL 0 75* 0 88* 0 83*       IMAGING & DIAGNOSTIC TESTING     Radiology Results: I have personally reviewed pertinent reports  Xr Chest Portable    Result Date: 12/3/2019  Impression: Mild bibasilar opacity, likely atelectasis  Pneumonia cannot be excluded  Workstation performed: [id number]     Xr Tibia Fibula 2 Vw Left    Result Date: 12/2/2019  Impression: No acute osseous abnormality  Soft tissue swelling as above  Osteopenia  Workstation performed: [id number]     Xr Foot 3+ Vw Left    Result Date: 12/2/2019  Impression: No acute osseous abnormality  Workstation performed: [id number]     Ct Tibia Fibula Left W Contrast    Result Date: 12/1/2019  Impression: Soft tissue swelling without organized collection or evidence of osteomyelitis Workstation performed: [id number]     Ct Foot Left W Contrast    Result Date: 12/1/2019  Impression: 1  Ulceration noted plantar to the 5th metatarsal head, without evidence of osteomyelitis 2  Soft tissue swelling without organized collection Workstation performed: [id number]     Other Diagnostic Testing: I have personally reviewed pertinent reports      ACTIVE MEDICATIONS     Current Facility-Administered Medications   Medication Dose Route Frequency    acetaminophen (TYLENOL) tablet 650 mg  650 mg Oral Q6H PRN    aspirin tablet 650 mg  650 mg Oral TID    bisacodyl (DULCOLAX) rectal suppository 10 mg  10 mg Rectal Daily PRN    ceFAZolin (ANCEF) IVPB (premix) 2,000 mg  2,000 mg Intravenous Q8H    colchicine (COLCRYS) tablet 0 6 mg  0 6 mg Oral BID    docusate sodium (COLACE) capsule 100 mg  100 mg Oral BID    enoxaparin (LOVENOX) subcutaneous injection 40 mg  40 mg Subcutaneous Daily    famotidine (PEPCID) tablet 20 mg  20 mg Oral BID    folic acid (FOLVITE) tablet 1,000 mcg  1,000 mcg Oral Daily    gabapentin (NEURONTIN) capsule 300 mg  300 mg Oral TID    levothyroxine tablet 25 mcg  25 mcg Oral Early Morning    methotrexate tablet 10 mg  10 mg Oral Weekly    nitroglycerin (NITROSTAT) SL tablet 0 4 mg  0 4 mg Sublingual Q5 Min PRN    nortriptyline (PAMELOR) capsule 50 mg  50 mg Oral HS    oxyCODONE (ROXICODONE) IR tablet 2 5 mg  2 5 mg Oral Q4H PRN    oxyCODONE (ROXICODONE) IR tablet 5 mg  5 mg Oral Q4H PRN    polyethylene glycol (MIRALAX) packet 17 g  17 g Oral Daily    predniSONE tablet 3 mg  3 mg Oral Daily    psyllium (METAMUCIL) 1 packet  1 packet Oral TID    sodium chloride 0 9 % with KCl 20 mEq/L infusion (premix)  75 mL/hr Intravenous Continuous       VTE Pharmacologic Prophylaxis: Enoxaparin (Lovenox)  VTE Mechanical Prophylaxis: sequential compression device    Portions of the record may have been created with voice recognition software  Occasional wrong word or "sound a like" substitutions may have occurred due to the inherent limitations of voice recognition software  Read the chart carefully and recognize, using context, where substitutions have occurred    ==

## 2019-12-03 NOTE — SOCIAL WORK
Met with pt to discuss therapies recommendation for rehab at an SNF  Pt declined  Pt states she lives with her sister who is able to assist her if needed  CM offered [facility]  Pt is agreeable  CM discussed freedom of choice  Pt prefers [facility]  [address] referral sent

## 2019-12-03 NOTE — PHYSICAL THERAPY NOTE
Physical Therapy Evaluation    Patient's Name: Elidia Portillo Beckley    Admitting Diagnosis  Patch of erysipelas [A46]  UTI (urinary tract infection) [N39 0]  EKG abnormalities [R94 31]  Facial swelling [R22 0]  Positional lightheadedness [R42]  Cellulitis of left lower extremity [L03 116]  Erysipelas of left lower extremity [A46]    Problem List  Patient Active Problem List   Diagnosis    Anxiety    Peripheral vascular disease ([facility] )    RLS (restless legs syndrome)    S/P total hip arthroplasty    Hypothyroidism    Osteoporosis    RBBB    Ambulatory dysfunction    Closed compression fracture of L3 lumbar vertebra    Rheumatoid arthritis involving multiple sites with positive rheumatoid factor (HCC)    Chronic pain syndrome    Other insomnia    Vitamin D deficiency    Dyspepsia    Sepsis ([facility] )    Malar rash    Cellulitis of left lower extremity    Abnormal EKG    Urinary tract infection due to Proteus    Myopericarditis    Drug-induced constipation       Past Medical History  Past Medical History:   Diagnosis Date    Anxiety     Depression     Disease of thyroid gland     HYPO    Femur neck fracture (HCC)     GERD (gastroesophageal reflux disease)     Hyperthyroidism     RESOLVED: [id number]    Osteoporosis     Polio     PVD (peripheral vascular disease) ([facility] )     Right BBB/left ant fasc block     UTI (urinary tract infection)     Varicella infection     LAST ASSESSED: [id number]       Past Surgical History  Past Surgical History:   Procedure Laterality Date    APPENDECTOMY      HIP ARTHROPLASTY Left 11/27/2017    Procedure: REMOVAL IM NAIL CONVERSION TO TOTAL HIP ARTHROPLASTY POSTERIOR APPROACH;  Surgeon: Duane Henri, MD;  Location:  MAIN OR;  Service: Orthopedics    HIP FRACTURE SURGERY      HIP SURGERY      both hips replaced;2013 left ,2014 right    JOINT REPLACEMENT Right     HIP TOTAL    WI CONV PREV HIP SURG TO TOT HIP ARTHROPLAS Left 10/4/2017    Procedure: Hareware removal of left hip;  Surgeon: Kedar Rothman MD;  Location: [facility];  Service: Orthopedics    REVISION TOTAL HIP ARTHROPLASTY Right     TONSILLECTOMY            12/03/19 1402   Note Type   Note type Eval only   Pain Assessment   Pain Assessment 0-10   Pain Score 9   Pain Type Acute pain   Pain Location Leg   Pain Orientation Left   Hospital Pain Intervention(s) Repositioned; Ambulation/increased activity   Response to Interventions tolerated; no change in pain   Home Living   Type of [address] Two level  (3 VILMA; bed upstairs; bathroom downstairs)   [address] Walker;Cane  (2 RW, 1 cane)   Additional Comments pt with history of two hip replacements; utilized RW   Prior Function   Level of Teller Independent with ADLs and functional mobility   Lives With Family  (sister)   Receives Help From Family   ADL Assistance Independent   IADLs Independent   Falls in the last 6 months 0   Vocational Retired   Comments Pt reports that she lives with her sister in a two story home; her bedroom is upstairs; pt reports that she doesn't have difficulty with negotiating steps   Restrictions/Precautions   Weight Bearing Precautions Per Order No   Other Precautions Cognitive; Chair Alarm; Bed Alarm;Multiple lines;Telemetry; Fall Risk;Pain   General   Additional Pertinent History Hx of polio and (L) foot acquired deformity   Family/Caregiver Present No   Cognition   Overall Cognitive Status Impaired   Arousal/Participation Cooperative   Orientation Level Oriented to person;Oriented to time;Oriented to situation;Disoriented to place  (patient thought she was at her home)   Memory Decreased short term memory   Following Commands Follows one step commands with increased time or repetition   Comments Pt requires increased time and repetition to process simple commands and questions   RLE Assessment   RLE Assessment   (grossly 3+/5)   LLE Assessment   LLE Assessment   (grossly 3+/5; pt w/limited ROM of (L) foot 2* to deformity)   Bed Mobility   Supine to Sit 3  Moderate assistance   Additional items Assist x 1; Increased time required;LE management  (hand held assist x 1)   Sit to Supine Unable to assess   Additional Comments Pt received in bed and mobilized to chair to conclude therapy session   Transfers   Sit to Stand 3  Moderate assistance   Additional items Assist x 1; Increased time required;Verbal cues  (required several trials; stood on 3rd trial w/mod A x 1)   Stand to Sit 3  Moderate assistance   Additional items Assist x 1; Increased time required;Verbal cues  (verbal cues for hand placement)   Stand pivot 3  Moderate assistance   Additional items Assist x 1; Increased time required   Ambulation/Elevation   Gait pattern Improper Weight shift;Decreased foot clearance;Decreased L stance; Inconsistent ora; Foward flexed; Short stride; Excessively slow   Gait Assistance 3  Moderate assist   Additional items Assist x 1   Assistive Device Rolling walker  (uses RW at baseline)   Distance 15'   Balance   Static Sitting Poor   Dynamic Sitting Poor   Static Standing Poor   Dynamic Standing Poor   Ambulatory Poor   Endurance Deficit   Endurance Deficit Yes   Endurance Deficit Description 2* to fatigue   Activity Tolerance   Activity Tolerance Patient limited by fatigue;Patient limited by pain   Nurse Made Aware Yes Chandu Alejandro RN   Assessment   Prognosis Fair   Problem List Decreased strength;Decreased range of motion;Decreased endurance; Impaired balance;Decreased mobility; Decreased cognition; Impaired judgement;Decreased safety awareness;Pain   Assessment Pt is a 74 y/o female who is seen for high complexity PT evaluation  Pt presented to [address] [place] [address] on 11/30/2019 from home with c/o (L) LE redness, swelling, facial swelling since last Wednesday w/dizziness  Pt diagnosed with (L) LE cellulitis along with sepsis likely secondary to the cellulitis   Pt presented with symptoms of tachycardia, chills, and positional lightheadedness  Pt co-morbidities/PMHx consist of RA, hypothyroidism, polio, PVD, RBBB, and h/o of similar rash  Pt currently lives in a two story home with her sister  Pt reports that they take care of each other  PTA, pt reports that she was independent with all ADLs, IADLs, and ambulation  She reports that she would be able to retrieve assistance from family if needed  Additionally, pt drives without any difficulty  Pt ambulates with a RW at all times  Pt cleared with nursing to mobilize and perform OOB activity prior to evaluation  Upon evaluation, pt received reclined in bed with bed alarm activated  Pt confused at time of evaluation, reporting "Are we at my sister's house?" Pt A&O x 3; disoriented to place  Pt understands why she is here, but requires increased processing time to respond to commands and questions  From a mobility standpoint, pt required mod A x 1 for bed mobility, transfers, and ambulation tasks  She required hand held assist and management of LEs to sit at EOB  Pt experienced dizziness with all positional changes, but was able to tolerate throughout the session  Initially, pt performed transfer without RW from bed to chair  However, pt reported that she uses the RW for all activity  Pt able to perform transfers and ambulate w/RW for 5'  However, she required multiple trials to get out of chair at mod A x 1  When asked if this is her normal/baseline, pt reported "this is how I usually am  I usually do everything by myself "  To conclude therapy session, pt able to return to chair with chair alarm activated and needs left within reach  Nursing available to assist her afterwards  Pt demonstrates notable deficits in her strength, cognition, balance, mobility, judgment, and safety awareness  Pt may benefit from acute skilled PT until medically cleared for D/C   Note unstable/unpredictable clinical picture due to limited mobility compared to patient's baseline, presentation as a fall risk, cognitive deficits, and co-morbidities/PMHx  Recommend that pt be D/C to rehab once medically cleared  Goals   Patient Goals to not be placed on the chair alarm   STG Expiration Date 12/17/19   Short Term Goal #1 In 10-14 days, pt will be able to ambulate 150-200' w/RW at mod I to improve functional independence; pt will be able to perform bed mobility tasks at supervision to improve her ability to get in and OOB; pt will be able to perform all transfers at mod I w/RW <--> LRAD to improve her ability to negotiate various surfaces; pt will be able to negotiate a full flight of steps w/LRAD and rail support at supervision to improve her ability to get to her bedroom on the second floor; pt will be able to improve B/L LE strength grade by 1/2-1 to improve her ability to maintain standing during activities   PT Treatment Day 0   Plan   Treatment/Interventions Functional transfer training;LE strengthening/ROM; Elevations; Therapeutic exercise; Endurance training;Bed mobility;Gait training;Spoke to nursing;Spoke to case management   PT Frequency   (3-5x/wk)   Recommendation   Recommendation Post acute IP rehab   Equipment Recommended Walker   PT - OK to Discharge Yes  (to rehab once medically cleared)   Modified Fulton Scale   Modified Fulton Scale 4   Barthel Index   Feeding 5   Bathing 0   Grooming Score 0   Dressing Score 5   Bladder Score 0   Bowels Score 5   Toilet Use Score 5   Transfers (Bed/Chair) Score 5   Mobility (Level Surface) Score 0   Stairs Score 0  (did not perform)   Barthel Index Score 25          Marcelo Cheatham, SPT

## 2019-12-04 LAB
ANION GAP SERPL CALCULATED.3IONS-SCNC: 7 MMOL/L (ref 4–13)
ANISOCYTOSIS BLD QL SMEAR: PRESENT
ATRIAL RATE: 105 BPM
ATRIAL RATE: 105 BPM
BACTERIA UR CULT: ABNORMAL
BACTERIA UR CULT: ABNORMAL
BASOPHILS # BLD MANUAL: 0 THOUSAND/UL (ref 0–0.1)
BASOPHILS NFR MAR MANUAL: 0 % (ref 0–1)
BUN SERPL-MCNC: 11 MG/DL (ref 5–25)
CALCIUM SERPL-MCNC: 8.3 MG/DL (ref 8.3–10.1)
CHLORIDE SERPL-SCNC: 113 MMOL/L (ref 100–108)
CO2 SERPL-SCNC: 22 MMOL/L (ref 21–32)
CREAT SERPL-MCNC: 0.55 MG/DL (ref 0.6–1.3)
EOSINOPHIL # BLD MANUAL: 0.15 THOUSAND/UL (ref 0–0.4)
EOSINOPHIL NFR BLD MANUAL: 2 % (ref 0–6)
ERYTHROCYTE [DISTWIDTH] IN BLOOD BY AUTOMATED COUNT: 15 % (ref 11.6–15.1)
GFR SERPL CREATININE-BSD FRML MDRD: 92 ML/MIN/1.73SQ M
GLUCOSE SERPL-MCNC: 76 MG/DL (ref 65–140)
HCT VFR BLD AUTO: 35.1 % (ref 34.8–46.1)
HGB BLD-MCNC: 11 G/DL (ref 11.5–15.4)
LYMPHOCYTES # BLD AUTO: 0.8 THOUSAND/UL (ref 0.6–4.47)
LYMPHOCYTES # BLD AUTO: 11 % (ref 14–44)
MCH RBC QN AUTO: 29 PG (ref 26.8–34.3)
MCHC RBC AUTO-ENTMCNC: 31.3 G/DL (ref 31.4–37.4)
MCV RBC AUTO: 93 FL (ref 82–98)
METAMYELOCYTES NFR BLD MANUAL: 3 % (ref 0–1)
MONOCYTES # BLD AUTO: 0.51 THOUSAND/UL (ref 0–1.22)
MONOCYTES NFR BLD: 7 % (ref 4–12)
NEUTROPHILS # BLD MANUAL: 5.51 THOUSAND/UL (ref 1.85–7.62)
NEUTS SEG NFR BLD AUTO: 76 % (ref 43–75)
NRBC BLD AUTO-RTO: 0 /100 WBCS
PLATELET # BLD AUTO: 313 THOUSANDS/UL (ref 149–390)
PLATELET BLD QL SMEAR: ADEQUATE
PMV BLD AUTO: 9.3 FL (ref 8.9–12.7)
POIKILOCYTOSIS BLD QL SMEAR: PRESENT
POLYCHROMASIA BLD QL SMEAR: PRESENT
POTASSIUM SERPL-SCNC: 4.2 MMOL/L (ref 3.5–5.3)
PR INTERVAL: 112 MS
PR INTERVAL: 112 MS
QRS AXIS: 141 DEGREES
QRS AXIS: 145 DEGREES
QRSD INTERVAL: 116 MS
QRSD INTERVAL: 118 MS
QT INTERVAL: 336 MS
QT INTERVAL: 338 MS
QTC INTERVAL: 444 MS
QTC INTERVAL: 446 MS
RBC # BLD AUTO: 3.79 MILLION/UL (ref 3.81–5.12)
RBC MORPH BLD: PRESENT
SODIUM SERPL-SCNC: 142 MMOL/L (ref 136–145)
T WAVE AXIS: 137 DEGREES
T WAVE AXIS: 139 DEGREES
VARIANT LYMPHS # BLD AUTO: 1 %
VENTRICULAR RATE: 105 BPM
VENTRICULAR RATE: 105 BPM
WBC # BLD AUTO: 7.25 THOUSAND/UL (ref 4.31–10.16)

## 2019-12-04 PROCEDURE — 99232 SBSQ HOSP IP/OBS MODERATE 35: CPT | Performed by: INTERNAL MEDICINE

## 2019-12-04 PROCEDURE — 85027 COMPLETE CBC AUTOMATED: CPT | Performed by: INTERNAL MEDICINE

## 2019-12-04 PROCEDURE — 93010 ELECTROCARDIOGRAM REPORT: CPT | Performed by: INTERNAL MEDICINE

## 2019-12-04 PROCEDURE — 80048 BASIC METABOLIC PNL TOTAL CA: CPT | Performed by: INTERNAL MEDICINE

## 2019-12-04 PROCEDURE — 85007 BL SMEAR W/DIFF WBC COUNT: CPT | Performed by: INTERNAL MEDICINE

## 2019-12-04 PROCEDURE — 86225 DNA ANTIBODY NATIVE: CPT | Performed by: INTERNAL MEDICINE

## 2019-12-04 PROCEDURE — G8988 SELF CARE GOAL STATUS: HCPCS

## 2019-12-04 PROCEDURE — G8987 SELF CARE CURRENT STATUS: HCPCS

## 2019-12-04 PROCEDURE — 97167 OT EVAL HIGH COMPLEX 60 MIN: CPT

## 2019-12-04 PROCEDURE — 99232 SBSQ HOSP IP/OBS MODERATE 35: CPT | Performed by: PODIATRIST

## 2019-12-04 PROCEDURE — 99231 SBSQ HOSP IP/OBS SF/LOW 25: CPT | Performed by: INTERNAL MEDICINE

## 2019-12-04 PROCEDURE — 86160 COMPLEMENT ANTIGEN: CPT | Performed by: INTERNAL MEDICINE

## 2019-12-04 RX ADMIN — ACETAMINOPHEN 650 MG: 325 TABLET ORAL at 17:42

## 2019-12-04 RX ADMIN — ASPIRIN 325 MG ORAL TABLET 650 MG: 325 PILL ORAL at 17:42

## 2019-12-04 RX ADMIN — CEFAZOLIN SODIUM 2000 MG: 2 SOLUTION INTRAVENOUS at 01:06

## 2019-12-04 RX ADMIN — GABAPENTIN 300 MG: 300 CAPSULE ORAL at 08:17

## 2019-12-04 RX ADMIN — COLCHICINE 0.6 MG: 0.6 TABLET, FILM COATED ORAL at 17:41

## 2019-12-04 RX ADMIN — NORTRIPTYLINE HYDROCHLORIDE 50 MG: 50 CAPSULE ORAL at 21:30

## 2019-12-04 RX ADMIN — OXYCODONE HYDROCHLORIDE 5 MG: 5 TABLET ORAL at 08:18

## 2019-12-04 RX ADMIN — DOCUSATE SODIUM 100 MG: 100 CAPSULE, LIQUID FILLED ORAL at 08:17

## 2019-12-04 RX ADMIN — BISACODYL 10 MG: 10 SUPPOSITORY RECTAL at 10:32

## 2019-12-04 RX ADMIN — CEFAZOLIN SODIUM 2000 MG: 2 SOLUTION INTRAVENOUS at 11:08

## 2019-12-04 RX ADMIN — CEFAZOLIN SODIUM 2000 MG: 2 SOLUTION INTRAVENOUS at 17:43

## 2019-12-04 RX ADMIN — LEVOTHYROXINE SODIUM 25 MCG: 25 TABLET ORAL at 05:21

## 2019-12-04 RX ADMIN — GABAPENTIN 300 MG: 300 CAPSULE ORAL at 21:30

## 2019-12-04 RX ADMIN — ASPIRIN 325 MG ORAL TABLET 650 MG: 325 PILL ORAL at 08:17

## 2019-12-04 RX ADMIN — FAMOTIDINE 20 MG: 20 TABLET ORAL at 08:17

## 2019-12-04 RX ADMIN — COLCHICINE 0.6 MG: 0.6 TABLET, FILM COATED ORAL at 08:17

## 2019-12-04 RX ADMIN — METHYLNALTREXONE BROMIDE 8 MG: 8 INJECTION, SOLUTION SUBCUTANEOUS at 13:55

## 2019-12-04 RX ADMIN — POLYETHYLENE GLYCOL 3350 17 G: 17 POWDER, FOR SOLUTION ORAL at 08:17

## 2019-12-04 RX ADMIN — SODIUM CHLORIDE AND POTASSIUM CHLORIDE 75 ML/HR: .9; .15 SOLUTION INTRAVENOUS at 15:08

## 2019-12-04 RX ADMIN — FOLIC ACID 1000 MCG: 1 TABLET ORAL at 08:17

## 2019-12-04 RX ADMIN — ENOXAPARIN SODIUM 40 MG: 40 INJECTION SUBCUTANEOUS at 08:17

## 2019-12-04 RX ADMIN — FAMOTIDINE 20 MG: 20 TABLET ORAL at 17:42

## 2019-12-04 RX ADMIN — PSYLLIUM HUSK 1 PACKET: 3.4 POWDER ORAL at 17:42

## 2019-12-04 RX ADMIN — DOCUSATE SODIUM 100 MG: 100 CAPSULE, LIQUID FILLED ORAL at 17:43

## 2019-12-04 RX ADMIN — GABAPENTIN 300 MG: 300 CAPSULE ORAL at 17:43

## 2019-12-04 RX ADMIN — ASPIRIN 325 MG ORAL TABLET 650 MG: 325 PILL ORAL at 21:30

## 2019-12-04 RX ADMIN — PREDNISONE 3 MG: 1 TABLET ORAL at 08:18

## 2019-12-04 NOTE — PLAN OF CARE
Problem: OCCUPATIONAL THERAPY ADULT  Goal: Performs self-care activities at highest level of function for planned discharge setting  See evaluation for individualized goals  Description  Treatment Interventions: ADL retraining, Functional transfer training, Endurance training, Cognitive reorientation, Patient/family training, Equipment evaluation/education, Compensatory technique education, Continued evaluation, Activityengagement  Equipment Recommended: Bedside commode, Tub seat with back       See flowsheet documentation for full assessment, interventions and recommendations  Note:   Limitation: Decreased ADL status, Decreased Safe judgement during ADL, Decreased cognition, Decreased endurance, Decreased high-level ADLs, Decreased self-care trans  Prognosis: Fair  Assessment: Pt is a 75year old female seen for initial OT evaluation s/p admission to [facility] 12/1/19 with L LE swelling, erythema, and pain  Pt dx'd with sepsis  Additional active problems include: peripheral vascular disease, hypothyroidism, RBBB, RA, malar rash, abnormal EKG and pyuria  Pt with active OT orders and cleared by MARYAM Potts for OT evaluation and OOB mobility  Pt is a questionable historian secondary to cognitive deficits  Background information obtained from chart and confirmed with pt  Pt resides with her sister in a [facility]  Sister is retired and pt reports that she can provide physical assistance  Pt reports being I with ADLs, IADLs, and functional mobility without use of DME PTA  Pt is a  and manages both her and her sister's medications  Pt is currently demonstrating the following occupational deficits: eating with set-up, grooming with set-up, UB bathing with Chase, LB bathing with modA, UB dressing with modA, LB dressing with maxA, toileting with totalA, bed mobility with Chase, functional transfers with maxA    Factors currently limiting pt's independence in these areas include: cognitive deficits, balance, generalized weakness, endurance, limited insight, poor safety awareness, and UNSUPPORTIVE HOME ENVIRONMENT  Overall, pt scored 30/100 on the Barthel Index  From an OT standpoint, recommend STR upon d/c, however anticipate that pt will refuse  Significant concern for patient's safety if she is to return directly to previous living environment, as pt demonstrates limited insight, poor safety awareness, and is a very high fall risk  If patient is to return home rather than STR, she will need OP OT, a bedside commode and significant increased assistance at home  Additionally, recommend fitness to drive prior to pt returning to driving and increased assistance with med mgmt (unclear if sister would be able to assume this role)  Pt is to continue to benefit from skilled occupational therapy services while in the hospital to maximize functioning and independence with daily activities  See below for OT goals to be addressed 3-5x/wk       OT Discharge Recommendation: Short Term Rehab  OT - OK to Discharge: (yes to STR, no to home)

## 2019-12-04 NOTE — PROGRESS NOTES
[facility] Podiatry - Progress Note  Patient: Lyn Bailey Colon 75 y o  female   MRN: [id number]  PCP: Zan Delgadillo PA-C  Unit/Bed#: [facility] 723-01 Encounter: [id number]  Date Of Visit: 12/04/19      Assessment:    Podiatric Assessment:  1  Left lower extremity cellulitis   2  Left lateral 5th metatarsal head blister   3  Left submet 5 pre-ulcerative lesion   4  Sepsis     Principal Problem:    Sepsis ([address] )  Active Problems:    Peripheral vascular disease (HCC)    Hypothyroidism    RBBB    Rheumatoid arthritis involving multiple sites with positive rheumatoid factor (HCC)    Malar rash    Cellulitis of left lower extremity    Abnormal EKG    Urinary tract infection due to Proteus    Myopericarditis    Drug-induced constipation      PLAN:    · Discontinue [address]  Left foot   No open lesions noted currently  · Left submet 5 pre ulcer lesion/callus was trimmed utilizing a  #10 blade to normal healthy epithelium  No open lesions noted of the left foot  Patient's WBC count continues to decrease  Continue IV Ancef perfect disease  · Weight-bearing as tolerated to left lower extremity  · Patient to follow up with Dr Donnell White for appropriate lower extremity brace prescription as an outpatient  · Rest of care per primary team   · Will consult with my attending in the plan as necessary  SUBJECTIVE:     Chief Complaint:   Chief Complaint   Patient presents with    Medical Problem     Pt c/o left leg redness and swelling and facial redness and swelling since Wednesday with dizzyness  The patient was seen, evaluated, and assessed at bedside today  The patient was awake, alert, and in no acute distress  The patient reports no acute events overnight  The patient reports mild pain at this time with palpation  Patient denies N/V/F/chills/SOB/CP        OBJECTIVE:     Vitals:   /71   Pulse 87   Temp 98 3 °F (36 8 °C)   Resp 20   Ht 5' (1 524 m)   Wt 45 4 kg (100 lb)   SpO2 99%   BMI 19 53 kg/m²     Temp (24hrs), Av 5 °F (36 9 °C), Min:98 °F (36 7 °C), Max:98 8 °F (37 1 °C)      PHYSICAL EXAM:     General: Alert, cooperative and no distress  Lungs: Non labored breathing  Abdomen: Soft, non-tender  Extremity: NVS at baseline B/l  MSK function at baseline B/l  No calf tenderness noted B/l  No open lesion noted  Well healing blister site  Edema and erythema of LLE remains but is significantly better than when previously seen   See image below    Clinical Images 19:    Left foot  Additional Data:     Labs:    Results from last 7 days   Lab Units 19  0531 19  0430   WBC Thousand/uL 7 25 10 51*   HEMOGLOBIN g/dL 11 0* 10 0*   HEMATOCRIT % 35 1 32 6*   PLATELETS Thousands/uL 313 241   NEUTROS PCT %  --  85*   LYMPHS PCT %  --  6*   MONOS PCT %  --  7   EOS PCT %  --  1     Results from last 7 days   Lab Units 19  0531  19  1401   POTASSIUM mmol/L 4 2   < > 3 6   CHLORIDE mmol/L 113*   < > 105   CO2 mmol/L 22   < > 27   BUN mg/dL 11   < > 13   CREATININE mg/dL 0 55*   < > 0 64   CALCIUM mg/dL 8 3   < > 8 0*   ALK PHOS U/L  --   --  97   ALT U/L  --   --  23   AST U/L  --   --  24    < > = values in this interval not displayed  * I Have Reviewed All Lab Data Listed Above  Recent Cultures (last 7 days):     Results from last 7 days   Lab Units 19  1716 19  1825 19  1747 19  1427   BLOOD CULTURE  No Growth at 24 hrs  No Growth at 24 hrs  No Growth at 72 hrs  No Growth at 72 hrs   --    URINE CULTURE   --   --   --  >100,000 cfu/ml Proteus mirabilis*       Imaging: I have personally reviewed pertinent films in PACS  EKG, Pathology, and Other Studies: I have personally reviewed pertinent reports  Today, Patient Was Seen By: Reece Zapien DPM    ** Please Note: Portions of the record may have been created with voice recognition software   Occasional wrong word or "sound a like" substitutions may have occurred due to the inherent limitations of voice recognition software  Read the chart carefully and recognize, using context, where substitutions have occurred   **

## 2019-12-04 NOTE — PROGRESS NOTES
Progress Note - Infectious Disease   Geetha Colon 75 y o  female MRN: [id number]  Unit/Bed#: [facility] 723-01 Encounter: [id number]      Impression:  1  LLE cellulitis in immunosuppressed host  2  RA on methotrexate and prednisone     Recommendations:  Afebrile today with less pain left lower extremity  ASO titer is negative  WBC count is now normal  1  Would continue cefazolin 2 g q 8 hours IV,   2  Warm pack still left lower leg t i d  As tolerated    Antibiotics:  1  Cefazolin 2 g q 8 hours IV, day 4 Rx    Subjective:  Feels much better but still has pain in the left calf area  Denies fevers, chills, or sweats  Denies nausea, vomiting, or diarrhea  Objective:  Vitals:  Temp:  [98 2 °F (36 8 °C)-98 8 °F (37 1 °C)] 98 2 °F (36 8 °C)  HR:  [85-87] 86  Resp:  [20] 20  BP: (113-131)/(69-79) 129/69  SpO2:  [96 %-99 %] 96 %  Temp (24hrs), Av 5 °F (36 9 °C), Min:98 2 °F (36 8 °C), Max:98 8 °F (37 1 °C)  Current: Temperature: 98 2 °F (36 8 °C)    Physical Exam:     General Appearance:  Alert, nontoxic, no acute distress  Throat: Oropharynx moist without lesions  Lips, mucosa, and tongue normal   Neck: Supple, symmetrical, trachea midline, no adenopathy,  no tenderness/mass/nodules   Lungs:   Clear to auscultation bilaterally, no audible wheezes, rhonchi or rales; respirations unlabored   Heart:  Regular rate and rhythm, S1, S2 normal, no murmur, rub or gallop   Abdomen:   Soft, non-tender, non-distended, positive bowel sounds  No masses, no organomegaly    No CVA tenderness   Extremities: LLE with 1+ macular erythema that is no longer tender or warm       The thigh involvement is now resolved   Skin: As above, surgical scars         Invasive Devices     Peripheral Intravenous Line            Peripheral IV 19 Left;Ventral (anterior) Forearm less than 1 day                Labs, Imaging, & Other studies:   All pertinent labs were personally reviewed  Results from last 7 days   Lab Units 19  1160 12/03/19  0430 12/02/19  0506   WBC Thousand/uL 7 25 10 51* 11 80*   HEMOGLOBIN g/dL 11 0* 10 0* 9 9*   PLATELETS Thousands/uL 313 241 209     Results from last 7 days   Lab Units 12/04/19  0531 12/03/19  0430 12/02/19  0506 12/01/19  1401  11/30/19  1338   SODIUM mmol/L 142 137 131* 136   < > 137   POTASSIUM mmol/L 4 2 3 4* 3 6 3 6   < > 4 8   CHLORIDE mmol/L 113* 108 102 105   < > 104   CO2 mmol/L 22 25 22 27   < > 28   BUN mg/dL 11 8 9 13   < > 15   CREATININE mg/dL 0 55* 0 52* 0 41* 0 64   < > 0 71   EGFR ml/min/1 73sq m 92 94 101 88   < > 84   CALCIUM mg/dL 8 3 7 8* 7 8* 8 0*   < > 9 3   AST U/L  --   --   --  24  --  53*   ALT U/L  --   --   --  23  --  32   ALK PHOS U/L  --   --   --  97  --  87    < > = values in this interval not displayed  Results from last 7 days   Lab Units 12/02/19  1716 11/30/19  1825 11/30/19  1747 11/30/19  1427   BLOOD CULTURE  No Growth at 24 hrs  No Growth at 24 hrs  No Growth at 72 hrs   No Growth at 72 hrs   --    URINE CULTURE   --   --   --  >100,000 cfu/ml Proteus mirabilis*  30,000-39,000 cfu/ml Escherichia coli*   MRSA CULTURE ONLY   --  No Methicillin Resistant Staphlyococcus aureus (MRSA) isolated  --   --

## 2019-12-04 NOTE — OCCUPATIONAL THERAPY NOTE
[address] Evaluation     Patient Name: Tresa Smith  Today's Date: 12/4/2019  Problem List  Principal Problem:    Sepsis [facility])  Active Problems:    Peripheral vascular disease ([facility] [address] )    Hypothyroidism    RBBB    Rheumatoid arthritis involving multiple sites with positive rheumatoid factor (HCC)    Malar rash    Cellulitis of left lower extremity    Abnormal EKG    Urinary tract infection due to Proteus    Myopericarditis    Drug-induced constipation    Past Medical History  Past Medical History:   Diagnosis Date    Anxiety     Depression     Disease of thyroid gland     HYPO    Femur neck fracture (HCC)     GERD (gastroesophageal reflux disease)     Hyperthyroidism     RESOLVED: [id number]    Osteoporosis     Polio     PVD (peripheral vascular disease) ([facility] [address] )     Right BBB/left ant fasc block     UTI (urinary tract infection)     Varicella infection     LAST ASSESSED: [id number]     Past Surgical History  Past Surgical History:   Procedure Laterality Date    APPENDECTOMY      HIP ARTHROPLASTY Left 11/27/2017    Procedure: REMOVAL IM NAIL CONVERSION TO TOTAL HIP ARTHROPLASTY POSTERIOR APPROACH;  Surgeon: Rossy Sun MD;  [facility];  Service: Orthopedics    HIP FRACTURE SURGERY      HIP SURGERY      both hips replaced;2013 left ,2014 right    JOINT REPLACEMENT Right     HIP TOTAL    TN CONV PREV HIP SURG TO TOT HIP ARTHROPLAS Left 10/4/2017    Procedure: Carlo Farrier removal of left hip;  Surgeon: Rossy Sun MD;  [facility]: [facility];  Service: Orthopedics    REVISION TOTAL HIP ARTHROPLASTY Right     TONSILLECTOMY          12/04/19 1700   Restrictions/Precautions   LLE Weight Bearing Per Order WBAT   Other Precautions Cognitive; Chair Alarm; Bed Alarm;Multiple lines;Telemetry; Fall Risk   Pain Assessment   Pain Assessment No/denies pain   Pain Score No Pain   Home Living   Type of Home House   Home Layout Two level;Bed/bath upstairs   Bathroom Equipment Commode   Additional Comments Pt is a questionable historian secondary to cognitive deficits  Pt reports that she resides with her sister in a [facility]  Reports sister is retired and can assist as needed    Prior Function   Level of [place] Independent with ADLs and functional mobility   Lives With   (sister)   ADL Assistance Independent   IADLs Independent   Falls in the last 6 months 0   Vocational Retired   Lifestyle   Autonomy Pt reports being completely I with ADLs, IADLs, and functional mobility without use of DME PTA  Pt is a , and reports that she manages both her and her sister's meds  Reciprocal Relationships sister (unclear how much assist she can provide)   Service to Others retired   Intrinsic Gratification sedentary   Psychosocial   Psychosocial (WDL) WDL   Subjective   Subjective "I'll be able to do that stuff when I get home"  Spoke with pt regarding STR recommendations, and concerns for pt's safety returning directly home  Pt not receptive and has limited insight into deficits  Pt reports that "[facility] is the only rehab that's not torture"  Spoke with CM regarding possible [facility] referral    ADL   Eating Assistance 5  Supervision/Setup   Grooming Assistance 5  Supervision/Setup   UB Bathing Assistance 4  Minimal Assistance   LB Bathing Assistance 3  Moderate [address] 3  Moderate [address] 2  Maximal Assistance   LB Dressing Deficit Don/doff R sock; Don/doff L sock; Thread RLE into underwear; Thread LLE into underwear;Pull up over hips   Toileting Assistance  1  Total Assistance   Toileting Deficit Perineal hygiene   Additional Comments pt largely incontinent in bed upon therapist entry (bloody mucus per rectum, RN aware)  Pt requires totalA for perihygiene  Pt sits EOB to trial LB dressing  Pt is able to thread R LE into underwear with significantly increased time, unable to thread L LE    Pt requires assistance to pull underwear up and over hips (requires 3 stands to fully pull them up due to poor standing tolerance/endurance)  Pt requires totalA to don b/l socks  Bed Mobility   Rolling R 4  Minimal assistance   Additional items Assist x 1; Increased time required;Verbal cues;LE management   Rolling L 4  Minimal assistance   Additional items Assist x 1; Increased time required;Verbal cues;LE management   Supine to Sit 4  Minimal assistance   Additional items Assist x 1; Increased time required;Verbal cues   Additional Comments Pt received lying supine in bed  Pt left seated in recliner with all needs within reach, chair alarm activated  Chair's breaks with difficulty maintaining locked position with pt's mvmt in chair, RN aware  Transfers   Sit to Stand 3  Moderate assistance   Additional items Assist x 1; Increased time required;Verbal cues   Stand to Sit 3  Moderate assistance   Additional items Assist x 1; Increased time required;Verbal cues   Stand pivot 2  Maximal assistance   Additional items Assist x 1; Increased time required;Verbal cues   Additional Comments Pt completed 6 STS transfers during OT evaluation and has very poor standing tolerance  Balance   Static Sitting Fair +   Dynamic Sitting Fair -   Static Standing Poor -   Dynamic Standing Poor -   Activity Tolerance   Activity Tolerance Patient limited by fatigue   Medical Staff Made Aware Spoke with CM regarding d/c recommendations   Nurse Made Aware Pt cleared by MARYAM Cox for OT evaluation and OOB mobility   RUE Assessment   RUE Assessment WFL  (generalized weakness)   LUE Assessment   LUE Assessment WFL  (generalized weakness)   Hand Function   Gross Motor Coordination Functional   Fine Motor Coordination Functional   Sensation   Light Touch No apparent deficits   Cognition   Overall Cognitive Status Impaired   Arousal/Participation Alert; Responsive; Cooperative   Attention Attends with cues to redirect   Orientation Level Oriented X4  (uses white board for correct year)   Memory Decreased short term memory;Decreased recall of recent events;Decreased recall of precautions   Following Commands Follows one step commands with increased time or repetition   Comments Pt is pleasant and cooperative, but mildly confused throughout  Limited insight into deficits, very poor safety awareness  Pt requires repetition of directions and increased time for processing  Significant concern for patient's safety completing higher level IADLs (DRIVING, med mgmt, cooking)  Assessment   Limitation Decreased ADL status; Decreased Safe judgement during ADL;Decreased cognition;Decreased endurance;Decreased high-level ADLs; Decreased self-care trans   Prognosis Fair   Assessment Pt is a 75year old female seen for initial OT evaluation s/p admission to [facility] 12/1/19 with L LE swelling, erythema, and pain  Pt dx'd with sepsis  Additional active problems include: peripheral vascular disease, hypothyroidism, RBBB, RA, malar rash, abnormal EKG and pyuria  Pt with active OT orders and cleared by MARYAM Russ for OT evaluation and OOB mobility  Pt is a questionable historian secondary to cognitive deficits  Background information obtained from chart and confirmed with pt  Pt resides with her sister in a [facility]  Sister is retired and pt reports that she can provide physical assistance  Pt reports being I with ADLs, IADLs, and functional mobility without use of DME PTA  Pt is a  and manages both her and her sister's medications  Pt is currently demonstrating the following occupational deficits: eating with set-up, grooming with set-up, UB bathing with Chase, LB bathing with modA, UB dressing with modA, LB dressing with maxA, toileting with totalA, bed mobility with Chase, functional transfers with maxA  Factors currently limiting pt's independence in these areas include: cognitive deficits, balance, generalized weakness, endurance, limited insight, poor safety awareness, and UNSUPPORTIVE HOME ENVIRONMENT  Overall, pt scored 30/100 on the Barthel Index  From an OT standpoint, recommend STR upon d/c, however anticipate that pt will refuse  Significant concern for patient's safety if she is to return directly to previous living environment, as pt demonstrates limited insight, poor safety awareness, and is a very high fall risk  If patient is to return home rather than STR, she will need OP OT, a bedside commode and significant increased assistance at home  Additionally, recommend fitness to drive prior to pt returning to driving and increased assistance with med mgmt (unclear if sister would be able to assume this role)  Pt is to continue to benefit from skilled occupational therapy services while in the hospital to maximize functioning and independence with daily activities  See below for OT goals to be addressed 3-5x/wk  Goals   Patient Goals to go home tomorrow   Plan   Treatment Interventions ADL retraining;Functional transfer training; Endurance training;Cognitive reorientation;Patient/family training;Equipment evaluation/education; Compensatory technique education;Continued evaluation; Activityengagement   Goal Expiration Date 12/14/19   OT Treatment Day 1   OT Frequency 3-5x/wk   Recommendation   OT Discharge Recommendation Short Term Rehab   Equipment Recommended Bedside commode;Tub seat with back   OT - OK to Discharge   (yes to STR, no to home)   Barthel Index   Feeding 10   Bathing 0   Grooming Score 5   Dressing Score 5   Bladder Score 0   Bowels Score 0   Toilet Use Score 5   Transfers (Bed/Chair) Score 5   Mobility (Level Surface) Score 0   Stairs Score 0   Barthel Index Score 30     Goals:    Pt will improve activity tolerance to G for min 30 min txment sessions for increase engagement in functional tasks    Pt will complete all self care w/ mod I using adaptive device and DME as needed    Pt will complete toileting w/ mod I w/ G hygiene/thoroughness using DME as needed    Pt will improve functional transfers to Mod I on/off all surfaces using DME as needed w/ G balance/safety     Pt will improve functional mobility during ADL/IADL/leisure tasks to Mod I using DME as needed w/ G balance/safety     Pt will participate in simulated IADL management task to increase independence to Mod I w/ G safety and endurance    Pt will be attentive 100% of the time during ongoing cognitive assessment w/ G participation to assist w/ safe d/c planning/recommendations    Pt will demonstrate G carryover of pt/caregiver education and training as appropriate w/o cues w/ good tolerance to increase safety during functional tasks    Pt will maintain standing with Girish for at least 5 minutes while completing a dynamic functional activity with good safety and endurance      Chino Lama, FELICITAS, OTR/L

## 2019-12-04 NOTE — PROGRESS NOTES
IM Residency Progress Note   Unit/Bed#: [facility] 723-01 Encounter: [id number]  SOD Team B       Geetha Colon 75 y o  female [id number]    Hospital Stay Days: 3      Assessment/Plan:    Principal Problem:    Sepsis ([facility] [address] )  Active Problems:    [facility] ([facility]    Hypothyroidism    RBBB    Rheumatoid arthritis involving multiple sites with positive rheumatoid factor (HCC)    Malar rash    Cellulitis of left lower extremity    Abnormal EKG    Urinary tract infection due to Proteus    Myopericarditis    Drug-induced constipation    1  Sepsis - POA  -  resolved  -likely secondary to cellulitis now her urine is positive for P Mirabilis asymptomatic   patient presented with leukocytosis and tachycardia with suspicion for infection of lower extremity given pain, swelling and erythema likely secondary to strep cellulitis     No evidence of purulence, or crepitus   Patient received 1 g IV ceftriaxone in the ED   Currently afebrile and feels better  - x-ray ankle, foot, tib-fib of left extremity showed soft tissue swelling No fluid collection or gas  - blood culture x2 are negative preliminary   repeated blood culture for fever preliminary negative as well   - vancomycin stopped per ID / cefazolin 2 g Q 8 per ID   - IV fluids cell in with 20 KCL at 75cc/hour   - MRSA culture negative  - oxycodone for pain 2 5mg for severe pain and oxycodone 5mg for breakthrough pain    - Erythema improving in left lower ext    No crepitus noted on exam  Motor function in left foot and toes intact, pulses intact  -LRINEC score for nec fasc is 0, low risk, CT left foot and leg showed same finding as Xray          2   Rheumatoid arthritis   - prednisone 3 mg a day  - methotrexate once a week every Monday-10mg  -LISA titer greater than 1280 anti histone pending   - check anti double-stranded LISA for lupus  - skin care plan noted per RN  - chest x-ray with atelectasis  -  continue incentive spirometer with nursing communication distress adherence         3  Myopericarditis  · Sharp chest pain, elevated troponin and C-reactive protein with EKG multiple ST elevation and OK depression discussed with Cardiology  · On exam pericardial rub  · Consulted Cardiology appreciate recommendation  · Echo with normal EF 60% no regional wall motion abnormality there is mild systolic anterior motion of the anterior leaflet of the mitral valve with trace regurgitation  There is no pericardial effusion  · Decrease aspirin 650 mg mg t i d per Cardiology  · Colchicine 0 6 mg twice a day  · Pepcid 20 mg b i d  To prevent peptic ulcer disease while on high-dose aspirin  · Discussed above plan with Cardiology and family over the phone-Mary Beth her granddaughter at [phone] as requested by patient                     4  Malar rash - rash does appear to spare the nasal labial fold now improving   - in a significantly elevated more than 12,00  - will check anti double stranded DNA and anti histone 1 9 with normal value 0-0 9 which is moderate positive less likely lupus induced malar rash         5  Hypothyroidism  - continue levothyroxine      6  UTI due to P  Mirabilis  · POA likely colonized given being asymptomatic  · Already on Cefazolin 2 g as above   · Sensitive to cefazolin      7  Left foot acquired deformity  · Likely due to rheumatoid arthritis  · Consulted podiatric who did bedside dressing  Likely address brace placement as outpatient      8  Constipation  · Likely due to opioid  · Will try bisacodyl suppository p r n  And MiraLax  · If no improvement will try Relistor subcu         9  RBBB on EKG        10  Hypokalemia  · Resolved with replacement  · Check magnesium normal  · Continue IV saline with 20 KCL  · Follow-up BMP daily today potassium 4 2  #  Physical therapy recommended rehab however patient refused and reported sister support and help at home, will likely discharge home with visiting nursing service    Disposition: Continue inpatient while needing IV antibiotics will discuss with ID if can be discharged home tomorrow on oral antibiotics  Subjective:   Patient is seen and examined at bedside  No acute events occurred overnight  She remains afebrile  She is still constipated  Her erythematous rash remains the same as yesterday  She refused PT recommendation to go to rehab yesterday  Vitals: Temp (24hrs), Av 5 °F (36 9 °C), Min:98 °F (36 7 °C), Max:98 8 °F (37 1 °C)  Current: Temperature: 98 3 °F (36 8 °C)  Vitals:    19 1500 19 1904 19 2136 19 0815   BP: 109/79 113/79 131/69 129/71   Pulse: 86 85 87    Resp:  20 20    Temp:  98 8 °F (37 1 °C) 98 8 °F (37 1 °C) 98 3 °F (36 8 °C)   TempSrc:       SpO2: 97% 99% 99%    Weight:       Height:        Body mass index is 19 53 kg/m²  I/O last 24 hours: In: 1460 [P O :360; I V :1100]  Out: 1250 [Urine:1250]      Physical Exam: /71   Pulse 87   Temp 98 3 °F (36 8 °C)   Resp 20   Ht 5' (1 524 m)   Wt 45 4 kg (100 lb)   SpO2 99%   BMI 19 53 kg/m²   General appearance: alert and oriented, in no acute distress  Eyes: Non incterus  Opacities noted likely cataract  Throat: Moist mucous member  Lungs: clear to auscultation bilaterally  Heart: regular rate and rhythm, S1, S2 normal and friction rub heard Left lower sternal border  Abdomen: Soft, distended, mild discomfort upon palpation and hypoactive bowel sounds  Extremities: Left lower extremity erythema remains same as yesterday  Neurologic:  Alert and oriented no obvious focal deficit        Invasive Devices     Peripheral Intravenous Line            Peripheral IV 19 Left;Ventral (anterior) Forearm less than 1 day                          Labs:   Recent Results (from the past 24 hour(s))   Basic metabolic panel    Collection Time: 19  5:31 AM   Result Value Ref Range    Sodium 142 136 - 145 mmol/L    Potassium 4 2 3 5 - 5 3 mmol/L    Chloride 113 (H) 100 - 108 mmol/L    CO2 22 21 - 32 mmol/L ANION GAP 7 4 - 13 mmol/L    BUN 11 5 - 25 mg/dL    Creatinine 0 55 (L) 0 60 - 1 30 mg/dL    Glucose 76 65 - 140 mg/dL    Calcium 8 3 8 3 - 10 1 mg/dL    eGFR 92 ml/min/1 73sq m   CBC and differential    Collection Time: 12/04/19  5:31 AM   Result Value Ref Range    WBC 7 25 4 31 - 10 16 Thousand/uL    RBC 3 79 (L) 3 81 - 5 12 Million/uL    Hemoglobin 11 0 (L) 11 5 - 15 4 g/dL    Hematocrit 35 1 34 8 - 46 1 %    MCV 93 82 - 98 fL    MCH 29 0 26 8 - 34 3 pg    MCHC 31 3 (L) 31 4 - 37 4 g/dL    RDW 15 0 11 6 - 15 1 %    MPV 9 3 8 9 - 12 7 fL    Platelets 142 488 - 338 Thousands/uL       Radiology Results: I have personally reviewed pertinent reports  Other Diagnostic Testing:   I have personally reviewed pertinent reports          Active Meds:   Current Facility-Administered Medications   Medication Dose Route Frequency    acetaminophen (TYLENOL) tablet 650 mg  650 mg Oral Q6H PRN    aspirin tablet 650 mg  650 mg Oral TID    bisacodyl (DULCOLAX) rectal suppository 10 mg  10 mg Rectal Daily PRN    ceFAZolin (ANCEF) IVPB (premix) 2,000 mg  2,000 mg Intravenous Q8H    colchicine (COLCRYS) tablet 0 6 mg  0 6 mg Oral BID    docusate sodium (COLACE) capsule 100 mg  100 mg Oral BID    enoxaparin (LOVENOX) subcutaneous injection 40 mg  40 mg Subcutaneous Daily    famotidine (PEPCID) tablet 20 mg  20 mg Oral BID    folic acid (FOLVITE) tablet 1,000 mcg  1,000 mcg Oral Daily    gabapentin (NEURONTIN) capsule 300 mg  300 mg Oral TID    levothyroxine tablet 25 mcg  25 mcg Oral Early Morning    methotrexate tablet 10 mg  10 mg Oral Weekly    nitroglycerin (NITROSTAT) SL tablet 0 4 mg  0 4 mg Sublingual Q5 Min PRN    nortriptyline (PAMELOR) capsule 50 mg  50 mg Oral HS    oxyCODONE (ROXICODONE) IR tablet 2 5 mg  2 5 mg Oral Q4H PRN    oxyCODONE (ROXICODONE) IR tablet 5 mg  5 mg Oral Q4H PRN    polyethylene glycol (MIRALAX) packet 17 g  17 g Oral Daily    predniSONE tablet 3 mg  3 mg Oral Daily    psyllium (METAMUCIL) 1 packet  1 packet Oral TID    sodium chloride 0 9 % with KCl 20 mEq/L infusion (premix)  75 mL/hr Intravenous Continuous         VTE Pharmacologic Prophylaxis: Enoxaparin (Lovenox)  VTE Mechanical Prophylaxis: sequential compression device    Ashanti Rich MD

## 2019-12-04 NOTE — PROGRESS NOTES
Progress Note - Infectious Disease   Geetha Colon 75 y o  female MRN: [id number]  Unit/Bed#: [facility] 723-01 Encounter: [id number]      Impression:  1  LLE cellulitis in immunosuppressed host  2  RA on methotrexate and prednisone     Recommendations:  Afebrile today with less pain left lower extremity  ASO titer is negative  WBC count has decreased to 10,510  1  Would continue cefazolin 2 g q 8 hours IV,     Antibiotics:  1  Cefazolin 2 g q 8 hours IV, day 3 Rx    Subjective:  Feels much better with minimal discomfort in the LLE  Denies fevers, chills, or sweats  Denies nausea, vomiting, or diarrhea  Objective:  Vitals:  Temp:  [97 5 °F (36 4 °C)-99 °F (37 2 °C)] 98 8 °F (37 1 °C)  HR:  [77-87] 85  Resp:  [16-20] 20  BP: ()/(62-81) 113/79  SpO2:  [84 %-100 %] 99 %  Temp (24hrs), Av 3 °F (36 8 °C), Min:97 5 °F (36 4 °C), Max:99 °F (37 2 °C)  Current: Temperature: 98 8 °F (37 1 °C)    Physical Exam:     General Appearance:  Alert, nontoxic, no acute distress  Throat: Oropharynx moist without lesions  Lips, mucosa, and tongue normal   Neck: Supple, symmetrical, trachea midline, no adenopathy,  no tenderness/mass/nodules   Lungs:   Clear to auscultation bilaterally, no audible wheezes, rhonchi or rales; respirations unlabored   Heart:  Regular rate and rhythm, S1, S2 normal, no murmur, rub or gallop   Abdomen:   Soft, non-tender, non-distended, positive bowel sounds  No masses, no organomegaly    No CVA tenderness   Extremities: LLE with 1+ macular erythema that is no longer tender or warm       The thigh involvement is now resolved   Skin: As above, surgical scars         Invasive Devices     Peripheral Intravenous Line            Peripheral IV 19 Right Forearm 3 days                Labs, Imaging, & Other studies:   All pertinent labs were personally reviewed  Results from last 7 days   Lab Units 19  0430 19  0506 19  0511   WBC Thousand/uL 10 51* 11 80* 13 68*   HEMOGLOBIN g/dL 10 0* 9 9* 9 8*   PLATELETS Thousands/uL 241 209 202     Results from last 7 days   Lab Units 12/03/19  0430 12/02/19  0506 12/01/19  1401  11/30/19  1338   SODIUM mmol/L 137 131* 136   < > 137   POTASSIUM mmol/L 3 4* 3 6 3 6   < > 4 8   CHLORIDE mmol/L 108 102 105   < > 104   CO2 mmol/L 25 22 27   < > 28   BUN mg/dL 8 9 13   < > 15   CREATININE mg/dL 0 52* 0 41* 0 64   < > 0 71   EGFR ml/min/1 73sq m 94 101 88   < > 84   CALCIUM mg/dL 7 8* 7 8* 8 0*   < > 9 3   AST U/L  --   --  24  --  53*   ALT U/L  --   --  23  --  32   ALK PHOS U/L  --   --  97  --  87    < > = values in this interval not displayed  Results from last 7 days   Lab Units 12/02/19  1716 11/30/19  1825 11/30/19  1747 11/30/19  1427   BLOOD CULTURE  Received in Microbiology Lab  Culture in Progress  Received in Microbiology Lab  Culture in Progress  No Growth at 48 hrs  No Growth at 48 hrs    --    URINE CULTURE   --   --   --  >100,000 cfu/ml Proteus mirabilis*   MRSA CULTURE ONLY   --  No Methicillin Resistant Staphlyococcus aureus (MRSA) isolated  --   --

## 2019-12-04 NOTE — PROGRESS NOTES
CARDIOLOGY PROGRESS NOTE     PATIENT INFORMATION     Name: Jacqueline Hunt   Age & Sex: 75 y o  female   MRN: [id number]  Hospital Stay Days: 3  Unit/Bed#: [facility] 723-01   Encounter: [id number]    ASSESSMENT/PLAN     Principal Problem:    Sepsis ([facility] [address] )  Active Problems:    [facility]    Hypothyroidism    RBBB    Rheumatoid arthritis involving multiple sites with positive rheumatoid factor (HCC)    Malar rash    Cellulitis of left lower extremity    Abnormal EKG    Urinary tract infection due to Proteus    Myopericarditis    Drug-induced constipation    Acute Myopericarditis likely 2/2 to Autoimmune etiology   Meets  At least 2 diagnostic clinical criteria including:  Pericardial friction rub come EKG changes with ST segment elevations, depressions diffusely, as well as elevated troponin in the setting of significant history of RA and hypothyroidism, As well as the absence for any other cause  -On exam today, Patient admits that chest pain has resolved since last night  Patient denies symptoms of heartburn/ acid reflux  Patient has never experienced this before  -on exam: friction rub very softly auscultated; no chest tenderness to palpation, clear breasts bilaterally  -troponin down trendin 88--> 0 75 today  -negative antistreptolysin O Ab, lactic acid, CK-MB  -Echo negative for pleural effusion, preserved EF of 60%  -pending Lyme serology with reflex Western blot and anti-double-stranded DNA antibodies  -anti histone antibody pending  -EKG findings with possible p r  Depressions and ST changes however similar to prior EKGs in 2017  -however in the setting of elevated CRP of greater than 90, will continue empiric treatment with aspirin 975 t i d   And colchicine 0 6 daily, nitroglycerin p r n ,   -Pepcid 40mg daily indicated to prevent GI side effects of medications   -patient should continue Aspirin 600mg for 1 month (down titrated from 975 mg)  -continue Colchicine 0 6mg for a total of 3 months   -Can CRP for treatment effectivity in the future however may be chronically elevated due to RA/ Hypothyroidism  -explained to patient adverse effects of medication and importance of continuing medications despite symptomatic relief     Sepsis 2/2 left lower extremity/ facial cellulitis  -focal site of possible source located on dorsal aspect of left foot, podiatry did debride the area yesterday 12/02/2019 however was limited due to pain, dressed  -patient admits to continued mild pain and swelling and erythema of left lower extremity with minimal improvement since yesterday  Will continue antibiotic therapy for now, monitor symptoms closely  -vitals improved; afebrile 97 5F, improved WBC 10 5 --> 7 25 today  -negative blood cultures x2 at 48 hours, negative MRSA culture  -continue cefazolin 2 g IV Q 8 per ID recommendations; input appreciated  -monitor symptomatic improvement     Positive Proteus mirabilis uncomplicated cystitis  Patient has a history of Proteus mirabilis UTI in the recent past (July 2019); currently complaining of pain on urination as well as suprapubic tenderness to palpation  -UA positive for nitrates, WBCs protein and red blood cells  -culture show Proteus mirabilis supple to Cefazolin  -continue with 2g IV Cefazolin  -ID recs appreciated     Other medical problems per primary team    SUBJECTIVE     Patient seen and examined  No acute events overnight  Patient states that she slept well last night however feels very tired this morning as well as asking for anti pain medication for left lower extremity cellulitis  Patient stating that her swelling and erythema of left lower extremity is not significantly improving despite antibiotics  Patient has significant swelling noted of the left ankle  Patient also admitting to constipation    Patient states that she continues to have pain on urination as well as suprapubic tenderness to palpation on exam  Otherwise patient denies any shortness of breath, chest pain, headache, dizziness, abdominal pain, nausea, vomiting , diarrhea  Review of Systems   HENT: Negative  Eyes: Negative  Respiratory: Negative  Endocrine: Negative  Hematologic/Lymphatic: Negative  Skin: Positive for rash  Left lower extremity swelling and erythema, pain   Musculoskeletal: Positive for joint swelling  Gastrointestinal: Positive for constipation  Genitourinary:        Dysuria, suprapubic pain   Neurological: Negative  Psychiatric/Behavioral: Negative  Allergic/Immunologic: Negative  OBJECTIVE     Vitals:    19 1500 19 1904 19 2136 19 0815   BP: 109/79 113/79 131/69 129/71   Pulse: 86 85 87    Resp:  20 20    Temp:  98 8 °F (37 1 °C) 98 8 °F (37 1 °C) 98 3 °F (36 8 °C)   TempSrc:       SpO2: 97% 99% 99%    Weight:       Height:          Temperature:   Temp (24hrs), Av 5 °F (36 9 °C), Min:98 °F (36 7 °C), Max:98 8 °F (37 1 °C)    Temperature: 98 3 °F (36 8 °C)  Intake & Output:  I/O        07 -  0700  07 -  0700  07 -  0700    P  O  120 360     I V  (mL/kg) 1062 5 (23 4) 1100 (24 2)     IV Piggyback       Total Intake(mL/kg) 1182 5 (26) 1460 (32 2)     Urine (mL/kg/hr) 2200 (2) 1250 (1 1)     Total Output 2200 1250     Net -1017 5 +210            Unmeasured Urine Occurrence 1 x          Weights:   IBW: 45 5 kg    Body mass index is 19 53 kg/m²  Weight (last 2 days)     None        Physical Exam   Constitutional: She is oriented to person, place, and time  She appears well-developed and well-nourished  No distress  HENT:   Head: Normocephalic and atraumatic  Right Ear: External ear normal    Left Ear: External ear normal    Nose: Nose normal    Mouth/Throat: Oropharynx is clear and moist    Eyes: Pupils are equal, round, and reactive to light  Conjunctivae and EOM are normal    Neck: Normal range of motion  Neck supple  No tracheal deviation present   No thyromegaly present  Cardiovascular: Normal rate, regular rhythm, normal heart sounds and intact distal pulses  No murmur heard  Pulmonary/Chest: Effort normal and breath sounds normal  No stridor  No respiratory distress  She has no wheezes  Abdominal: Soft  Bowel sounds are normal  There is tenderness (Tenderness in suprapubic region to palpation)  Musculoskeletal: Normal range of motion  She exhibits no edema, tenderness or deformity  Neurological: She is alert and oriented to person, place, and time  She displays normal reflexes  No cranial nerve deficit  Coordination normal    Skin: Skin is warm and dry  Capillary refill takes less than 2 seconds  She is not diaphoretic  No erythema  No pallor  Left lower extremity edema and erythema, tenderness to palpation, swelling of ankle joint   Psychiatric: She has a normal mood and affect  Her behavior is normal  Judgment and thought content normal    Nursing note and vitals reviewed  LABORATORY DATA     Labs: I have personally reviewed pertinent reports  Results from last 7 days   Lab Units 12/04/19  0531 12/03/19  0430 12/02/19  0506 12/01/19  0511   WBC Thousand/uL 7 25 10 51* 11 80* 13 68*   HEMOGLOBIN g/dL 11 0* 10 0* 9 9* 9 8*   HEMATOCRIT % 35 1 32 6* 32 6* 30 9*   PLATELETS Thousands/uL 313 241 209 202   NEUTROS PCT %  --  85* 84* 85*   MONOS PCT %  --  7 8 8      Results from last 7 days   Lab Units 12/04/19  0531 12/03/19  0430 12/02/19  0506 12/01/19  1401  11/30/19  1338   POTASSIUM mmol/L 4 2 3 4* 3 6 3 6   < > 4 8   CHLORIDE mmol/L 113* 108 102 105   < > 104   CO2 mmol/L 22 25 22 27   < > 28   BUN mg/dL 11 8 9 13   < > 15   CREATININE mg/dL 0 55* 0 52* 0 41* 0 64   < > 0 71   CALCIUM mg/dL 8 3 7 8* 7 8* 8 0*   < > 9 3   ALK PHOS U/L  --   --   --  97  --  87   ALT U/L  --   --   --  23  --  32   AST U/L  --   --   --  24  --  53*    < > = values in this interval not displayed       Results from last 7 days   Lab Units 12/03/19 0430 MAGNESIUM mg/dL 2 4              Results from last 7 days   Lab Units 12/01/19  1401   LACTIC ACID mmol/L 1 9     Results from last 7 days   Lab Units 12/02/19  2201 12/02/19  1840 12/02/19  1541   TROPONIN I ng/mL 0 75* 0 88* 0 83*       IMAGING & DIAGNOSTIC TESTING     Radiology Results: I have personally reviewed pertinent reports  Xr Chest Portable    Result Date: 12/3/2019  Impression: Mild bibasilar opacity, likely atelectasis  Pneumonia cannot be excluded  Workstation performed: [id number]     Xr Tibia Fibula 2 Vw Left    Result Date: 12/2/2019  Impression: No acute osseous abnormality  Soft tissue swelling as above  Osteopenia  Workstation performed: [id number]     Xr Foot 3+ Vw Left    Result Date: 12/2/2019  Impression: No acute osseous abnormality  Workstation performed: [id number]     Ct Tibia Fibula Left W Contrast    Result Date: 12/1/2019  Impression: Soft tissue swelling without organized collection or evidence of osteomyelitis Workstation performed: [id number]     Ct Foot Left W Contrast    Result Date: 12/1/2019  Impression: 1  Ulceration noted plantar to the 5th metatarsal head, without evidence of osteomyelitis 2  Soft tissue swelling without organized collection Workstation performed: [id number]     Other Diagnostic Testing: I have personally reviewed pertinent reports        ACTIVE MEDICATIONS     Current Facility-Administered Medications   Medication Dose Route Frequency    acetaminophen (TYLENOL) tablet 650 mg  650 mg Oral Q6H PRN    aspirin tablet 650 mg  650 mg Oral TID    bisacodyl (DULCOLAX) rectal suppository 10 mg  10 mg Rectal Daily PRN    ceFAZolin (ANCEF) IVPB (premix) 2,000 mg  2,000 mg Intravenous Q8H    colchicine (COLCRYS) tablet 0 6 mg  0 6 mg Oral BID    docusate sodium (COLACE) capsule 100 mg  100 mg Oral BID    enoxaparin (LOVENOX) subcutaneous injection 40 mg  40 mg Subcutaneous Daily    famotidine (PEPCID) tablet 20 mg  20 mg Oral BID    folic acid (FOLVITE) tablet 1,000 mcg  1,000 mcg Oral Daily    gabapentin (NEURONTIN) capsule 300 mg  300 mg Oral TID    levothyroxine tablet 25 mcg  25 mcg Oral Early Morning    methotrexate tablet 10 mg  10 mg Oral Weekly    nitroglycerin (NITROSTAT) SL tablet 0 4 mg  0 4 mg Sublingual Q5 Min PRN    nortriptyline (PAMELOR) capsule 50 mg  50 mg Oral HS    oxyCODONE (ROXICODONE) IR tablet 2 5 mg  2 5 mg Oral Q4H PRN    oxyCODONE (ROXICODONE) IR tablet 5 mg  5 mg Oral Q4H PRN    polyethylene glycol (MIRALAX) packet 17 g  17 g Oral Daily    predniSONE tablet 3 mg  3 mg Oral Daily    psyllium (METAMUCIL) 1 packet  1 packet Oral TID    sodium chloride 0 9 % with KCl 20 mEq/L infusion (premix)  75 mL/hr Intravenous Continuous     VTE Pharmacologic Prophylaxis: Enoxaparin (Lovenox)  VTE Mechanical Prophylaxis: sequential compression device    ==   Mine Reyes MD  Resident, PGY-1  [facility]

## 2019-12-05 ENCOUNTER — APPOINTMENT (INPATIENT)
Dept: NON INVASIVE DIAGNOSTICS | Facility: HOSPITAL | Age: 74
DRG: 872 | End: 2019-12-05
Payer: MEDICARE

## 2019-12-05 PROBLEM — M32.12 SYSTEMIC LUPUS ERYTHEMATOSUS (SLE) WITH PERICARDITIS (HCC): Status: ACTIVE | Noted: 2019-11-30

## 2019-12-05 PROBLEM — A41.9 SEPSIS (HCC): Status: RESOLVED | Noted: 2019-11-30 | Resolved: 2019-12-05

## 2019-12-05 LAB
ANISOCYTOSIS BLD QL SMEAR: PRESENT
BASOPHILS # BLD MANUAL: 0 THOUSAND/UL (ref 0–0.1)
BASOPHILS NFR MAR MANUAL: 0 % (ref 0–1)
C3 SERPL-MCNC: 154 MG/DL (ref 90–180)
C4 SERPL-MCNC: 36 MG/DL (ref 10–40)
DSDNA AB SER-ACNC: 22 IU/ML (ref 0–9)
EOSINOPHIL # BLD MANUAL: 0.27 THOUSAND/UL (ref 0–0.4)
EOSINOPHIL NFR BLD MANUAL: 4 % (ref 0–6)
ERYTHROCYTE [DISTWIDTH] IN BLOOD BY AUTOMATED COUNT: 15.3 % (ref 11.6–15.1)
GIANT PLATELETS BLD QL SMEAR: PRESENT
HCT VFR BLD AUTO: 34.2 % (ref 34.8–46.1)
HGB BLD-MCNC: 10.6 G/DL (ref 11.5–15.4)
LYMPHOCYTES # BLD AUTO: 0.67 THOUSAND/UL (ref 0.6–4.47)
LYMPHOCYTES # BLD AUTO: 10 % (ref 14–44)
MCH RBC QN AUTO: 28.8 PG (ref 26.8–34.3)
MCHC RBC AUTO-ENTMCNC: 31 G/DL (ref 31.4–37.4)
MCV RBC AUTO: 93 FL (ref 82–98)
METAMYELOCYTES NFR BLD MANUAL: 1 % (ref 0–1)
MONOCYTES # BLD AUTO: 0.13 THOUSAND/UL (ref 0–1.22)
MONOCYTES NFR BLD: 2 % (ref 4–12)
NEUTROPHILS # BLD MANUAL: 5.55 THOUSAND/UL (ref 1.85–7.62)
NEUTS SEG NFR BLD AUTO: 83 % (ref 43–75)
NRBC BLD AUTO-RTO: 0 /100 WBCS
PLATELET # BLD AUTO: 380 THOUSANDS/UL (ref 149–390)
PLATELET BLD QL SMEAR: ADEQUATE
PMV BLD AUTO: 9 FL (ref 8.9–12.7)
POLYCHROMASIA BLD QL SMEAR: PRESENT
RBC # BLD AUTO: 3.68 MILLION/UL (ref 3.81–5.12)
RBC MORPH BLD: PRESENT
WBC # BLD AUTO: 6.69 THOUSAND/UL (ref 4.31–10.16)

## 2019-12-05 PROCEDURE — 97116 GAIT TRAINING THERAPY: CPT

## 2019-12-05 PROCEDURE — 85027 COMPLETE CBC AUTOMATED: CPT | Performed by: INTERNAL MEDICINE

## 2019-12-05 PROCEDURE — 85007 BL SMEAR W/DIFF WBC COUNT: CPT | Performed by: INTERNAL MEDICINE

## 2019-12-05 PROCEDURE — 97535 SELF CARE MNGMENT TRAINING: CPT

## 2019-12-05 PROCEDURE — 97530 THERAPEUTIC ACTIVITIES: CPT

## 2019-12-05 PROCEDURE — 99232 SBSQ HOSP IP/OBS MODERATE 35: CPT | Performed by: INTERNAL MEDICINE

## 2019-12-05 RX ORDER — SODIUM CHLORIDE 9 MG/ML
75 INJECTION, SOLUTION INTRAVENOUS CONTINUOUS
Status: DISCONTINUED | OUTPATIENT
Start: 2019-12-05 | End: 2019-12-07 | Stop reason: HOSPADM

## 2019-12-05 RX ADMIN — CEFAZOLIN SODIUM 2000 MG: 2 SOLUTION INTRAVENOUS at 01:32

## 2019-12-05 RX ADMIN — PSYLLIUM HUSK 1 PACKET: 3.4 POWDER ORAL at 08:58

## 2019-12-05 RX ADMIN — COLCHICINE 0.6 MG: 0.6 TABLET, FILM COATED ORAL at 17:06

## 2019-12-05 RX ADMIN — GABAPENTIN 300 MG: 300 CAPSULE ORAL at 21:04

## 2019-12-05 RX ADMIN — LEVOTHYROXINE SODIUM 25 MCG: 25 TABLET ORAL at 05:11

## 2019-12-05 RX ADMIN — CEFAZOLIN SODIUM 2000 MG: 2 SOLUTION INTRAVENOUS at 17:05

## 2019-12-05 RX ADMIN — GABAPENTIN 300 MG: 300 CAPSULE ORAL at 17:06

## 2019-12-05 RX ADMIN — ASPIRIN 325 MG ORAL TABLET 650 MG: 325 PILL ORAL at 08:57

## 2019-12-05 RX ADMIN — SODIUM CHLORIDE 75 ML/HR: 0.9 INJECTION, SOLUTION INTRAVENOUS at 14:40

## 2019-12-05 RX ADMIN — GABAPENTIN 300 MG: 300 CAPSULE ORAL at 08:58

## 2019-12-05 RX ADMIN — PREDNISONE 3 MG: 1 TABLET ORAL at 08:56

## 2019-12-05 RX ADMIN — COLCHICINE 0.6 MG: 0.6 TABLET, FILM COATED ORAL at 08:58

## 2019-12-05 RX ADMIN — OXYCODONE HYDROCHLORIDE 2.5 MG: 10 TABLET ORAL at 10:46

## 2019-12-05 RX ADMIN — DOCUSATE SODIUM 100 MG: 100 CAPSULE, LIQUID FILLED ORAL at 08:58

## 2019-12-05 RX ADMIN — ASPIRIN 325 MG ORAL TABLET 650 MG: 325 PILL ORAL at 17:06

## 2019-12-05 RX ADMIN — ASPIRIN 325 MG ORAL TABLET 650 MG: 325 PILL ORAL at 21:05

## 2019-12-05 RX ADMIN — FOLIC ACID 1000 MCG: 1 TABLET ORAL at 08:59

## 2019-12-05 RX ADMIN — FAMOTIDINE 20 MG: 20 TABLET ORAL at 17:06

## 2019-12-05 RX ADMIN — PSYLLIUM HUSK 1 PACKET: 3.4 POWDER ORAL at 17:04

## 2019-12-05 RX ADMIN — ENOXAPARIN SODIUM 40 MG: 40 INJECTION SUBCUTANEOUS at 08:58

## 2019-12-05 RX ADMIN — FAMOTIDINE 20 MG: 20 TABLET ORAL at 08:58

## 2019-12-05 RX ADMIN — DOCUSATE SODIUM 100 MG: 100 CAPSULE, LIQUID FILLED ORAL at 17:06

## 2019-12-05 RX ADMIN — OXYCODONE HYDROCHLORIDE 2.5 MG: 10 TABLET ORAL at 01:33

## 2019-12-05 RX ADMIN — NORTRIPTYLINE HYDROCHLORIDE 50 MG: 50 CAPSULE ORAL at 21:04

## 2019-12-05 RX ADMIN — POLYETHYLENE GLYCOL 3350 17 G: 17 POWDER, FOR SOLUTION ORAL at 08:58

## 2019-12-05 RX ADMIN — OXYCODONE HYDROCHLORIDE 2.5 MG: 10 TABLET ORAL at 22:06

## 2019-12-05 RX ADMIN — CEFAZOLIN SODIUM 2000 MG: 2 SOLUTION INTRAVENOUS at 10:47

## 2019-12-05 NOTE — PHYSICAL THERAPY NOTE
Physical Therapy Progress Note     12/05/19 1311   Pain Assessment   Pain Assessment 0-10   Pain Score 9   Pain Location Leg   Pain Orientation Left   Restrictions/Precautions   LLE Weight Bearing Per Order WBAT   Other Precautions Fall Risk;Pain; Chair Alarm   General   Family/Caregiver Present No   Cognition   Overall Cognitive Status [facility]   Arousal/Participation Alert; Cooperative   Bed Mobility   Supine to Sit 3  Moderate assistance   Additional items Assist x 2;LE management;Verbal cues   Transfers   Sit to Stand   (initially mod assist, progressed to min assist)   Additional items Assist x 1;Verbal cues   Stand to Sit 4  Minimal assistance   Additional items Assist x 1;Verbal cues   Ambulation/Elevation   Gait pattern Antalgic;Narrow JEOVANY  (slow)   Gait Assistance 4  Minimal assist   Additional items Assist x 1;Verbal cues  (chair follow)   Assistive Device Rolling walker   Distance 70 feet and 50 feet   Stair Management Assistance 4  Minimal assist   Additional items Assist x 1;Verbal cues   Stair Management Technique One rail R;Step to pattern   Number of Stairs 4   Balance   Static Sitting Fair   Static Standing Fair -   Dynamic Standing Poor +   Ambulatory Poor +   Endurance Deficit   Endurance Deficit Yes   Endurance Deficit Description pain LLE   Activity Tolerance   Activity Tolerance Patient limited by pain   Nurse [address] to see per RN   Exercises   Hip Flexion Sitting;10 reps;AROM; Bilateral   Knee AROM Long Arc Quad Sitting;10 reps;AROM; Bilateral   Assessment   Prognosis Fair   Problem List Decreased strength;Decreased endurance; Impaired balance;Decreased mobility; Decreased coordination; Impaired judgement;Decreased safety awareness;Pain;Orthopedic restrictions;Decreased skin integrity   Assessment Pt is making slow but steady progress with the use of a rolling walker  Mod assist of two required for bed mobility    Initially requires mod assist sit to stand however progressed to min assist   Gait is limited by LLE pain requiring min assist at times for safe walker management  Pt requires additional time to complete ambulation trials today due to slow speed as well as step length  Rest breaks completed between ambulation trials  Pt completed 4 steps as well however reported pain increased to "10/10 "  Chair alarm active post session  Pt would benefit from continued physical therapy to maximize functional independence  Goals   Patient Goals Go home   STG Expiration Date 12/17/19   Short Term Goal #1 In 10-14 days, pt will be able to ambulate 150-200' w/RW at mod I to improve functional independence; pt will be able to perform bed mobility tasks at supervision to improve her ability to get in and OOB; pt will be able to perform all transfers at mod I w/RW <--> LRAD to improve her ability to negotiate various surfaces; pt will be able to negotiate a full flight of steps w/LRAD and rail support at supervision to improve her ability to get to her bedroom on the second floor; pt will be able to improve B/L LE strength grade by 1/2-1 to improve her ability to maintain standing during activities   PT Treatment Day 1   Plan   Treatment/Interventions Functional transfer training;LE strengthening/ROM; Elevations; Therapeutic exercise; Endurance training;Patient/family training;Bed mobility;Gait training;Spoke to nursing   Progress Progressing toward goals   PT Frequency   (3-5x/week)   Recommendation   Recommendation   (Rehab)   Equipment Recommended Jelani Reason; Wheelchair  (RW)     Anurag Ingram PTA

## 2019-12-05 NOTE — PROGRESS NOTES
CARDIOLOGY PROGRESS NOTE     PATIENT INFORMATION     Name: Michaela Hopkins   Age & Sex: 75 y o  female   MRN: [id number]  Hospital Stay Days: 4  Unit/Bed#: [facility]PHP 723-01   Encounter: [id number]    ASSESSMENT/PLAN     Principal Problem:    Sepsis ([facility] [address] )  Active Problems:    [facility]    Hypothyroidism    RBBB    Rheumatoid arthritis involving multiple sites with positive rheumatoid factor (HCC)    Malar rash    Cellulitis of left lower extremity    Abnormal EKG    Urinary tract infection due to Proteus    Myopericarditis    Drug-induced constipation    Acute Myopericarditis likely 2/2 to Autoimmune etiology   Meets  At least 2 diagnostic clinical criteria including:  Pericardial friction rub come EKG changes with ST segment elevations, depressions diffusely, as well as elevated troponin in the setting of significant history of RA and hypothyroidism, As well as the absence for any other cause  -On exam today, Patient admits that chest pain has resolved since last night  Patient denies symptoms of heartburn/ acid reflux   Patient has never experienced this before  -on exam: friction rub very softly auscultated; no chest tenderness to palpation, clear breasts bilaterally  -troponin down trendin 88--> 0 75 today  -negative antistreptolysin O Ab, lactic acid, CK-MB  -Echo negative for pleural effusion, preserved EF of 60%  -pending Lyme serology with reflex Western blot and anti-double-stranded DNA antibodies  -anti histone antibody pending  -EKG findings with possible p r   Depressions and ST changes however similar to prior EKGs in 2017  -however in the setting of elevated CRP of greater than 90, will proceed with empiric treatment of aspirin and colchicine  -Pepcid 40mg daily indicated to prevent GI side effects of medications   -patient should continue Aspirin 600mg (down titrated from 975 mg); will continue to decrease weekly with improving symptoms  -continue Colchicine 0 6mg for a total of 3 months   -Can CRP for treatment effectivity in the future however may be chronically elevated due to RA/ Hypothyroidism  -explained to patient adverse effects of medication and importance of continuing medications despite symptomatic relief     Sepsis 2/2 left lower extremity/ facial cellulitis  -focal site of possible source located on dorsal aspect of left foot, podiatry did debride the area yesterday 12/02/2019 however was limited due to pain, dressed  -patient denies pain however admits to continued swelling and erythema of left lower extremity with minimal improvement since yesterday  Will continue antibiotic therapy for now, monitor symptoms closely  Delayed improvement of cellulitis likely secondary to chronic immunosuppression   -vitals improved; afebrile 97 5F, improved WBC 10 5 --> 7 25--> 6 69 today  -negative blood cultures x2 at 48 hours, negative MRSA culture  -continue cefazolin 2 g IV Q 8 per ID recommendations; input appreciated  -monitor symptomatic improvement     Uncomplicated Proteus mirabilis and E coli acute cystitis  Patient has a history of Proteus mirabilis UTI in the recent past (July 2019); currently complaining of pain on urination as well as suprapubic tenderness to palpation  -UA positive for nitrates, WBCs protein and red blood cells  -culture show Proteus mirabilis supple to Cefazolin  -continue with 2g IV Cefazolin  -ID recs appreciated     Other medical problems per primary team    SUBJECTIVE     Patient seen and examined  No acute events overnight  Patient states that she feels well overall however is disappointed with improvement of cellulitis of left lower extremity, as symptoms have not been alleviated completely  Patient notes continued redness and swelling around left lower extremity  Also continues to admit pain on urination and suprapubic tenderness  Admits to a bowel movement yesterday, relief of constipation and abdominal pain    Denies shortness of breath, chest pain, headache, dizziness, blurred vision, fevers, chills  Continue antibiotic therapy and IV fluids  Review of Systems   Constitutional: Negative  HENT: Negative  Eyes: Negative  Respiratory: Negative  Negative for chest tightness, shortness of breath, wheezing and stridor  Cardiovascular: Negative  Negative for chest pain and palpitations  Gastrointestinal: Negative  Negative for abdominal pain, diarrhea and nausea  Endocrine: Negative  Genitourinary: Positive for dysuria and pelvic pain  Skin: Negative  Left lower extremity erythema and swelling, no arthritic pain in left ankle, or limited range of motion   Allergic/Immunologic: Negative  Neurological: Negative  Hematological: Negative  Psychiatric/Behavioral: Negative  OBJECTIVE     Vitals:    19 1547 19 1844 19 2216 19 0752   BP: 129/69 129/72 128/74 127/74   Pulse: 86 92 83 81   Resp:       Temp: 98 2 °F (36 8 °C)  98 5 °F (36 9 °C)    TempSrc:       SpO2: 96% 96% 100% 99%   Weight:       Height:          Temperature:   Temp (24hrs), Av 4 °F (36 9 °C), Min:98 2 °F (36 8 °C), Max:98 5 °F (36 9 °C)    Temperature: 98 5 °F (36 9 °C)  Intake & Output:  I/O       701 -  0700  07 -  07 07 -  0700    P  O  360  120    I V  (mL/kg) 1100 (24 2)      Total Intake(mL/kg) 1460 (32 2)  120 (2 6)    Urine (mL/kg/hr) 1250 (1 1) 600 (0 6)     Total Output 1250 600     Net +210 -600 +120           Unmeasured Stool Occurrence  2 x         Weights:   IBW: 45 5 kg    Body mass index is 19 53 kg/m²  Weight (last 2 days)     None        Physical Exam   Constitutional: She is oriented to person, place, and time  She appears well-developed and well-nourished  No distress  HENT:   Head: Normocephalic and atraumatic     Right Ear: External ear normal    Left Ear: External ear normal    Nose: Nose normal    Mouth/Throat: Oropharynx is clear and moist  No oropharyngeal exudate  Eyes: Pupils are equal, round, and reactive to light  Conjunctivae and EOM are normal    Neck: Normal range of motion  Neck supple  No tracheal deviation present  No thyromegaly present  Cardiovascular: Normal rate, regular rhythm, normal heart sounds and intact distal pulses  Exam reveals no gallop and no friction rub  No murmur heard  Pulmonary/Chest: Effort normal and breath sounds normal  No stridor  No respiratory distress  She has no wheezes  Abdominal: Soft  Bowel sounds are normal  She exhibits no distension  There is no tenderness  Suprapubic tenderness to palpation; no CVA tenderness   Musculoskeletal: Normal range of motion  She exhibits edema (of Left lower extremity)  She exhibits no tenderness or deformity  Neurological: She is alert and oriented to person, place, and time  No cranial nerve deficit  Coordination normal    Skin: Skin is warm and dry  Capillary refill takes less than 2 seconds  No rash noted  She is not diaphoretic  No erythema  No pallor  Psychiatric: She has a normal mood and affect  Her behavior is normal  Judgment and thought content normal    Nursing note and vitals reviewed  LABORATORY DATA     Labs: I have personally reviewed pertinent reports      Results from last 7 days   Lab Units 12/05/19  0550 12/04/19  0531 12/03/19  0430 12/02/19  0506 12/01/19  0511   WBC Thousand/uL 6 69 7 25 10 51* 11 80* 13 68*   HEMOGLOBIN g/dL 10 6* 11 0* 10 0* 9 9* 9 8*   HEMATOCRIT % 34 2* 35 1 32 6* 32 6* 30 9*   PLATELETS Thousands/uL 380 313 241 209 202   NEUTROS PCT %  --   --  85* 84* 85*   MONOS PCT %  --   --  7 8 8   MONO PCT % 2* 7  --   --   --       Results from last 7 days   Lab Units 12/04/19  0531 12/03/19  0430 12/02/19  0506 12/01/19  1401  11/30/19  1338   POTASSIUM mmol/L 4 2 3 4* 3 6 3 6   < > 4 8   CHLORIDE mmol/L 113* 108 102 105   < > 104   CO2 mmol/L 22 25 22 27   < > 28   BUN mg/dL 11 8 9 13   < > 15   CREATININE mg/dL 0 55* 0 52* 0 41* 0 64   < > 0 71   CALCIUM mg/dL 8 3 7 8* 7 8* 8 0*   < > 9 3   ALK PHOS U/L  --   --   --  97  --  87   ALT U/L  --   --   --  23  --  32   AST U/L  --   --   --  24  --  53*    < > = values in this interval not displayed  Results from last 7 days   Lab Units 12/03/19  0430   MAGNESIUM mg/dL 2 4              Results from last 7 days   Lab Units 12/01/19  1401   LACTIC ACID mmol/L 1 9     Results from last 7 days   Lab Units 12/02/19  2201 12/02/19  1840 12/02/19  1541   TROPONIN I ng/mL 0 75* 0 88* 0 83*       IMAGING & DIAGNOSTIC TESTING     Radiology Results: I have personally reviewed pertinent reports  Xr Chest Portable    Result Date: 12/3/2019  Impression: Mild bibasilar opacity, likely atelectasis  Pneumonia cannot be excluded  Workstation performed: [id number]     Xr Tibia Fibula 2 Vw Left    Result Date: 12/2/2019  Impression: No acute osseous abnormality  Soft tissue swelling as above  Osteopenia  Workstation performed: [id number]     Xr Foot 3+ Vw Left    Result Date: 12/2/2019  Impression: No acute osseous abnormality  Workstation performed: [id number]     Ct Tibia Fibula Left W Contrast    Result Date: 12/1/2019  Impression: Soft tissue swelling without organized collection or evidence of osteomyelitis Workstation performed: [id number]     Ct Foot Left W Contrast    Result Date: 12/1/2019  Impression: 1  Ulceration noted plantar to the 5th metatarsal head, without evidence of osteomyelitis 2  Soft tissue swelling without organized collection Workstation performed: [id number]     Other Diagnostic Testing: I have personally reviewed pertinent reports        ACTIVE MEDICATIONS     Current Facility-Administered Medications   Medication Dose Route Frequency    acetaminophen (TYLENOL) tablet 650 mg  650 mg Oral Q6H PRN    aspirin tablet 650 mg  650 mg Oral TID    bisacodyl (DULCOLAX) rectal suppository 10 mg  10 mg Rectal Daily PRN    ceFAZolin (ANCEF) IVPB (premix) 2,000 mg  2,000 mg Intravenous Q8H    colchicine (COLCRYS) tablet 0 6 mg  0 6 mg Oral BID    docusate sodium (COLACE) capsule 100 mg  100 mg Oral BID    enoxaparin (LOVENOX) subcutaneous injection 40 mg  40 mg Subcutaneous Daily    famotidine (PEPCID) tablet 20 mg  20 mg Oral BID    folic acid (FOLVITE) tablet 1,000 mcg  1,000 mcg Oral Daily    gabapentin (NEURONTIN) capsule 300 mg  300 mg Oral TID    levothyroxine tablet 25 mcg  25 mcg Oral Early Morning    methotrexate tablet 10 mg  10 mg Oral Weekly    nitroglycerin (NITROSTAT) SL tablet 0 4 mg  0 4 mg Sublingual Q5 Min PRN    nortriptyline (PAMELOR) capsule 50 mg  50 mg Oral HS    oxyCODONE (ROXICODONE) IR tablet 2 5 mg  2 5 mg Oral Q4H PRN    oxyCODONE (ROXICODONE) IR tablet 5 mg  5 mg Oral Q4H PRN    polyethylene glycol (MIRALAX) packet 17 g  17 g Oral Daily    predniSONE tablet 3 mg  3 mg Oral Daily    psyllium (METAMUCIL) 1 packet  1 packet Oral TID    sodium chloride 0 9 % with KCl 20 mEq/L infusion (premix)  75 mL/hr Intravenous Continuous     VTE Pharmacologic Prophylaxis: Enoxaparin (Lovenox)  VTE Mechanical Prophylaxis: sequential compression device    ==  Nikia Parrish MD  Resident, PGY-1  [facility]

## 2019-12-05 NOTE — PROGRESS NOTES
Progress Note - Infectious Disease   Geetha Colon 75 y o  female MRN: [id number]  Unit/Bed#: -01 Encounter: [id number]      Impression:  1  LLE cellulitis in immunosuppressed host  2  RA on methotrexate and prednisone   3  Pericarditis     Recommendations:  Afebrile today with less pain left lower extremity  ASO titer is negative  WBC count is now normal  1  Would continue cefazolin 2 g q 8 hours IV,   2  Warm pack left lower leg t i d  As tolerated    Antibiotics:  1  Cefazolin 2 g q 8 hours IV, day 5 Rx    Subjective:  She is worried about having lupus    Denies fevers, chills, or sweats  Denies nausea, vomiting, or diarrhea  Objective:  Vitals:  Temp:  [97 8 °F (36 6 °C)-98 7 °F (37 1 °C)] 97 8 °F (36 6 °C)  HR:  [81-92] 81  Resp:  [18] 18  BP: (127-129)/(72-74) 128/74  SpO2:  [96 %-100 %] 99 %  Temp (24hrs), Av 3 °F (36 8 °C), Min:97 8 °F (36 6 °C), Max:98 7 °F (37 1 °C)  Current: Temperature: 97 8 °F (36 6 °C)    Physical Exam:     General Appearance:  Alert, nontoxic, no acute distress  Throat: Oropharynx moist without lesions  Lips, mucosa, and tongue normal   Neck: Supple, symmetrical, trachea midline, no adenopathy,  no tenderness/mass/nodules   Lungs:   Clear to auscultation bilaterally, no audible wheezes, rhonchi or rales; respirations unlabored   Heart:  Regular rate and rhythm, S1, S2 normal, no murmur, rub or gallop   Abdomen:   Soft, non-tender, non-distended, positive bowel sounds  No masses, no organomegaly    No CVA tenderness   Extremities: LLE with trace macular erythema that is no longer tender or warm       The thigh involvement is now resolved   Skin: As above, surgical scars         Invasive Devices     Peripheral Intravenous Line            Peripheral IV 19 Left;Ventral (anterior) Forearm 1 day                Labs, Imaging, & Other studies:   All pertinent labs were personally reviewed  Results from last 7 days   Lab Units 19  0550 19  0531 12/03/19  0430   WBC Thousand/uL 6 69 7 25 10 51*   HEMOGLOBIN g/dL 10 6* 11 0* 10 0*   PLATELETS Thousands/uL 380 313 241     Results from last 7 days   Lab Units 12/04/19  0531 12/03/19  0430 12/02/19  0506 12/01/19  1401  11/30/19  1338   SODIUM mmol/L 142 137 131* 136   < > 137   POTASSIUM mmol/L 4 2 3 4* 3 6 3 6   < > 4 8   CHLORIDE mmol/L 113* 108 102 105   < > 104   CO2 mmol/L 22 25 22 27   < > 28   BUN mg/dL 11 8 9 13   < > 15   CREATININE mg/dL 0 55* 0 52* 0 41* 0 64   < > 0 71   EGFR ml/min/1 73sq m 92 94 101 88   < > 84   CALCIUM mg/dL 8 3 7 8* 7 8* 8 0*   < > 9 3   AST U/L  --   --   --  24  --  53*   ALT U/L  --   --   --  23  --  32   ALK PHOS U/L  --   --   --  97  --  87    < > = values in this interval not displayed  Results from last 7 days   Lab Units 12/02/19  1716 11/30/19  1825 11/30/19  1747 11/30/19  1427   BLOOD CULTURE  No Growth at 48 hrs  No Growth at 48 hrs  No Growth After 4 Days  No Growth After 4 Days    --    URINE CULTURE   --   --   --  >100,000 cfu/ml Proteus mirabilis*  30,000-39,000 cfu/ml Escherichia coli*   MRSA CULTURE ONLY   --  No Methicillin Resistant Staphlyococcus aureus (MRSA) isolated  --   --

## 2019-12-05 NOTE — PROGRESS NOTES
12/05/19 1300   Clinical Encounter Type   Visited With Patient   Muslim Encounters   Muslim Needs Prayer   Sacramental Encounters   Sacrament of Sick-Anointing Anointed

## 2019-12-05 NOTE — OCCUPATIONAL THERAPY NOTE
Occupational Therapy Treatment Note     12/05/19 1439   Restrictions/Precautions   Weight Bearing Precautions Per Order Yes   LLE Weight Bearing Per Order WBAT   Other Precautions Fall Risk;Pain; Chair Alarm   Lifestyle   Autonomy Pt reports being completely I with ADLs, IADLs, and functional mobility without use of DME PTA  Pt is a , and reports that she manages both her and her sister's meds  Reciprocal Relationships sister (unclear how much assist she can provide)   Service to Others retired   Intrinsic Gratification sedentary   Pain Assessment   Pain Assessment 0-10   Pain Score Worst Possible Pain   Cognition   Overall Cognitive Status WFL   Arousal/Participation Alert; Cooperative   Attention Attends with cues to redirect   Orientation Level Oriented X4   Memory Decreased short term memory;Decreased recall of recent events;Decreased recall of precautions   Following Commands Follows one step commands with increased time or repetition   Assessment   Assessment Pt participated in occupational therapy with focus on activity tolerance, bed mob, unsupported sitting balance and tolerance for pt engagement in functional self-care task/LB self-care  Pt cleared by MARYAM/Flaquita for pt participation in occupational  therapy  Pt received HOB raised/supine pt sitting upright and agreeable to therapy following pt Identifiers confirmed  Pt reported her goal today to return to home with her sister  Pt reported that she would not go to rehab  Pt reported that she has been to two rehabs [facility]) and she did not like them  Pt tolerated session well however required assist for bed mob, functional transfers/mob and LB self-care/dressing/donning brief and to complete donning socks  Pt reported that her sister will assist her as needed upon d/c to home  Pt reported that she has handicap bathroom    Pt would benefit from in-pt rehab to continue to address pt deficits with decreased strength, coordination and balance which currently impair pt ADL and functional mob  Pt chair alarm active post session all needs within reach     Plan   Treatment Interventions ADL retraining   Goal Expiration Date 12/14/19   OT Treatment Day 2   OT Frequency 3-5x/wk   Recommendation   OT Discharge Recommendation Short Term Rehab   Barthel Index   Feeding 10   Bathing 0   Grooming Score 5   Dressing Score 5   Bladder Score 0   Bowels Score 0   Toilet Use Score 5   Transfers (Bed/Chair) Score 5   Mobility (Level Surface) Score 0   Stairs Score 0   Barthel Index Score 30   Modified Crai Scale   Modified Linn Scale 4       Tita KHAN/L

## 2019-12-05 NOTE — PROGRESS NOTES
IM Residency Progress Note   Unit/Bed#: [facility] 723-01 Encounter: [id number]  SOD Team B       Geetha Colon 75 y o  female [id number]    Hospital Stay Days: 4      Assessment/Plan:    Principal Problem:    Sepsis ([facility] )  Active Problems:    Peripheral vascular disease ([facility] )    Hypothyroidism    RBBB    Rheumatoid arthritis involving multiple sites with positive rheumatoid factor (HCC)    Malar rash    Cellulitis of left lower extremity    Abnormal EKG    Urinary tract infection due to Proteus    Myopericarditis    Drug-induced constipation  1  Sepsis - POA  -  resolved  -likely secondary to cellulitis now her urine is positive for P Mirabilis asymptomatic   patient presented with leukocytosis and tachycardia with suspicion for infection of lower extremity given pain, swelling and erythema likely secondary to strep cellulitis     No evidence of purulence, or crepitus   Patient received 1 g IV ceftriaxone in the ED   Currently afebrile and feels better  - x-ray ankle, foot, tib-fib of left extremity showed soft tissue swelling No fluid collection or gas  - blood culture x2 are negative preliminary   repeated blood culture for fever preliminary negative as well   - vancomycin stopped per ID / cefazolin 2 g Q 8 per ID   - IV fluids NS  at 75cc/hour   - MRSA culture negative  - Oxycodone for pain 2 5mg for severe pain and oxycodone 5mg for breakthrough pain    - Erythema improving in left lower ext    - No crepitus noted on exam  Motor function in left foot and toes intact, pulses intact  -LRINEC score for nec fasc is 0, low risk, CT left foot and leg showed same finding as Xray          2   Rheumatoid arthritis   - prednisone 3 mg a day  - methotrexate once a week every Monday-10mg  -LISA titer greater than 1280 anti histone pending   - skin care plan noted per RN  - chest x-ray with atelectasis  -  continue incentive spirometer with nursing communication distress adherence         3  Myopericarditis  · Sharp chest pain, elevated troponin and C-reactive protein with EKG multiple ST elevation and SC depression discussed with Cardiology  · On exam pericardial rub  · Consulted Cardiology appreciate recommendation  · Echo with normal EF 60% no regional wall motion abnormality there is mild systolic anterior motion of the anterior leaflet of the mitral valve with trace regurgitation  Sundar [place] is no pericardial effusion  · Continue  aspirin 650 mg mg t i d per Cardiology  · Colchicine 0 6 mg twice a day  · Pepcid 20 mg b i d  To prevent peptic ulcer disease while on high-dose aspirin  · Discussed above plan with Cardiology and family over the phone-Mary Beth her granddaughter at [phone] as requested by patient                     4  SLE with pericarditis new diagnosis  - Malar rash - rash does appear to spare the nasal labial fold now improving   - LISA  significantly elevated more than 12,00  - Anti double stranded DNA  > 22 normal 0-9 , and anti histone 1 9 with normal value 0-0 9 which is moderate positive less likely lupus induced malar rash  - she is already on methotrexate and prednisone for rheumatoid arthritis  - discussed diagnosis with family- Elvira Boyer as patient requested to update family  - already on treatment for myopericarditis as above  - will hold hydroxychloroquine per outpatient rheumatologist         5  Hypothyroidism  - continue levothyroxine      6  UTI due to P  Mirabilis  · POA likely colonized given being asymptomatic  · Already on Cefazolin 2 g as above   · Sensitive to cefazolin      7  Left foot acquired deformity  · Likely due to rheumatoid arthritis  · Consulted podiatric who did bedside dressing  Likely address brace placement as outpatient      8  Constipation  · Likely due to opioid  · Will try bisacodyl suppository p r n  And MiraLax  ·  Relistor subcu p r n  Every other day responded with small-bowel moving         9  RBBB on EKG        10   Hypokalemia  · Resolved with replacement  · Check magnesium normal  · Follow-up [facility] daily today potassium 4 2  ·   #  Physical therapy recommended rehab however patient refused and reported sister support and help at home, will likely discharge home with visiting nursing service  Disposition: Continue inpatient while needing IV antibiotics will discuss with ID if can be discharged home tomorrow on oral antibiotics  Subjective:   Patient seen and examined at bedside this morning  No acute events occurred overnight  She remains afebrile  She denied nausea, vomiting, or shortness of breath  She is asking if she can go home  Vitals: Temp (24hrs), Av 3 °F (36 8 °C), Min:98 2 °F (36 8 °C), Max:98 5 °F (36 9 °C)  Current: Temperature: 98 5 °F (36 9 °C)  Vitals:    19 1547 19 1844 19 2216 19 0752   BP: 129/69 129/72 128/74 127/74   Pulse: 86 92 83 81   Resp:       Temp: 98 2 °F (36 8 °C)  98 5 °F (36 9 °C)    TempSrc:       SpO2: 96% 96% 100% 99%   Weight:       Height:        Body mass index is 19 53 kg/m²  I/O last 24 hours: In: -   Out: 600 [Urine:600]      Physical Exam: /74   Pulse 81   Temp 98 7 °F (37 1 °C) (Oral)   Resp 18   Ht 5' (1 524 m)   Wt 45 4 kg (100 lb)   SpO2 99%   BMI 19 53 kg/m²   General appearance: alert and oriented, in no acute distress  Eyes: No discharge  Lungs: clear to auscultation bilaterally  Heart: regular rate and rhythm, S1, S2 normal and no rub  Abdomen: Soft, nontender, mildly distended, hypoactive bowel sounds  Extremities: Left lower extremity distal erythema, tenderness on pitting edema  Neurologic:  Alert and oriented, fluent speech no focal deficit        Invasive Devices     Peripheral Intravenous Line            Peripheral IV 19 Left;Ventral (anterior) Forearm 1 day                          Labs:   Recent Results (from the past 24 hour(s))   CBC and differential    Collection Time: 19  5:50 AM   Result Value Ref Range    WBC 6 69 4 31 - 10 16 Thousand/uL    RBC 3 68 (L) 3 81 - 5 12 Million/uL    Hemoglobin 10 6 (L) 11 5 - 15 4 g/dL    Hematocrit 34 2 (L) 34 8 - 46 1 %    MCV 93 82 - 98 fL    MCH 28 8 26 8 - 34 3 pg    MCHC 31 0 (L) 31 4 - 37 4 g/dL    RDW 15 3 (H) 11 6 - 15 1 %    MPV 9 0 8 9 - 12 7 fL    Platelets 351 416 - 657 Thousands/uL       Radiology Results: I have personally reviewed pertinent reports  Other Diagnostic Testing:   I have personally reviewed pertinent reports          Active Meds:   Current Facility-Administered Medications   Medication Dose Route Frequency    acetaminophen (TYLENOL) tablet 650 mg  650 mg Oral Q6H PRN    aspirin tablet 650 mg  650 mg Oral TID    bisacodyl (DULCOLAX) rectal suppository 10 mg  10 mg Rectal Daily PRN    ceFAZolin (ANCEF) IVPB (premix) 2,000 mg  2,000 mg Intravenous Q8H    colchicine (COLCRYS) tablet 0 6 mg  0 6 mg Oral BID    docusate sodium (COLACE) capsule 100 mg  100 mg Oral BID    enoxaparin (LOVENOX) subcutaneous injection 40 mg  40 mg Subcutaneous Daily    famotidine (PEPCID) tablet 20 mg  20 mg Oral BID    folic acid (FOLVITE) tablet 1,000 mcg  1,000 mcg Oral Daily    gabapentin (NEURONTIN) capsule 300 mg  300 mg Oral TID    levothyroxine tablet 25 mcg  25 mcg Oral Early Morning    methotrexate tablet 10 mg  10 mg Oral Weekly    nitroglycerin (NITROSTAT) SL tablet 0 4 mg  0 4 mg Sublingual Q5 Min PRN    nortriptyline (PAMELOR) capsule 50 mg  50 mg Oral HS    oxyCODONE (ROXICODONE) IR tablet 2 5 mg  2 5 mg Oral Q4H PRN    oxyCODONE (ROXICODONE) IR tablet 5 mg  5 mg Oral Q4H PRN    polyethylene glycol (MIRALAX) packet 17 g  17 g Oral Daily    predniSONE tablet 3 mg  3 mg Oral Daily    psyllium (METAMUCIL) 1 packet  1 packet Oral TID    sodium chloride 0 9 % with KCl 20 mEq/L infusion (premix)  75 mL/hr Intravenous Continuous         VTE Pharmacologic Prophylaxis: Enoxaparin (Lovenox)  VTE Mechanical Prophylaxis: sequential compression device    MD Krupa Buck Roberto Luis MD  Available on [email or web address]  THE [facility]  Internal medicine resident

## 2019-12-05 NOTE — PLAN OF CARE
Problem: PHYSICAL THERAPY ADULT  Goal: Performs mobility at highest level of function for planned discharge setting  See evaluation for individualized goals  Description  Treatment/Interventions: Functional transfer training, LE strengthening/ROM, Elevations, Therapeutic exercise, Endurance training, Bed mobility, Gait training, Spoke to nursing, Spoke to case management  Equipment Recommended: Noelle Matthews       See flowsheet documentation for full assessment, interventions and recommendations  Outcome: Progressing  Note:   Prognosis: Fair  Problem List: Decreased strength, Decreased endurance, Impaired balance, Decreased mobility, Decreased coordination, Impaired judgement, Decreased safety awareness, Pain, Orthopedic restrictions, Decreased skin integrity  Assessment: Pt is making slow but steady progress with the use of a rolling walker  Mod assist of two required for bed mobility  Initially requires mod assist sit to stand however progressed to min assist   Gait is limited by LLE pain requiring min assist at times for safe walker management  Pt requires additional time to complete ambulation trials today due to slow speed as well as step length  Rest breaks completed between ambulation trials  Pt completed 4 steps as well however reported pain increased to "10/10 "  Chair alarm active post session  Pt would benefit from continued physical therapy to maximize functional independence  Recommendation: (Rehab)     PT - OK to Discharge: Yes(to rehab once medically cleared)    See flowsheet documentation for full assessment

## 2019-12-06 ENCOUNTER — APPOINTMENT (INPATIENT)
Dept: NON INVASIVE DIAGNOSTICS | Facility: HOSPITAL | Age: 74
DRG: 872 | End: 2019-12-06
Payer: MEDICARE

## 2019-12-06 ENCOUNTER — TELEPHONE (OUTPATIENT)
Dept: VASCULAR SURGERY | Facility: CLINIC | Age: 74
End: 2019-12-06

## 2019-12-06 PROBLEM — R21 RASH OF BACK: Status: ACTIVE | Noted: 2019-12-06

## 2019-12-06 LAB
ANION GAP SERPL CALCULATED.3IONS-SCNC: 6 MMOL/L (ref 4–13)
ANISOCYTOSIS BLD QL SMEAR: PRESENT
BACTERIA BLD CULT: NORMAL
BACTERIA BLD CULT: NORMAL
BASOPHILS # BLD MANUAL: 0.06 THOUSAND/UL (ref 0–0.1)
BASOPHILS NFR MAR MANUAL: 1 % (ref 0–1)
BUN SERPL-MCNC: 8 MG/DL (ref 5–25)
CALCIUM SERPL-MCNC: 7.8 MG/DL (ref 8.3–10.1)
CHLORIDE SERPL-SCNC: 112 MMOL/L (ref 100–108)
CO2 SERPL-SCNC: 24 MMOL/L (ref 21–32)
CREAT SERPL-MCNC: 0.52 MG/DL (ref 0.6–1.3)
EOSINOPHIL # BLD MANUAL: 0.06 THOUSAND/UL (ref 0–0.4)
EOSINOPHIL NFR BLD MANUAL: 1 % (ref 0–6)
ERYTHROCYTE [DISTWIDTH] IN BLOOD BY AUTOMATED COUNT: 15.2 % (ref 11.6–15.1)
GFR SERPL CREATININE-BSD FRML MDRD: 94 ML/MIN/1.73SQ M
GLUCOSE SERPL-MCNC: 85 MG/DL (ref 65–140)
HCT VFR BLD AUTO: 33.8 % (ref 34.8–46.1)
HGB BLD-MCNC: 10.4 G/DL (ref 11.5–15.4)
LYMPHOCYTES # BLD AUTO: 0.52 THOUSAND/UL (ref 0.6–4.47)
LYMPHOCYTES # BLD AUTO: 8 % (ref 14–44)
MCH RBC QN AUTO: 28.7 PG (ref 26.8–34.3)
MCHC RBC AUTO-ENTMCNC: 30.8 G/DL (ref 31.4–37.4)
MCV RBC AUTO: 93 FL (ref 82–98)
METAMYELOCYTES NFR BLD MANUAL: 1 % (ref 0–1)
MONOCYTES # BLD AUTO: 0.19 THOUSAND/UL (ref 0–1.22)
MONOCYTES NFR BLD: 3 % (ref 4–12)
MYELOCYTES NFR BLD MANUAL: 3 % (ref 0–1)
NEUTROPHILS # BLD MANUAL: 5.29 THOUSAND/UL (ref 1.85–7.62)
NEUTS SEG NFR BLD AUTO: 82 % (ref 43–75)
NRBC BLD AUTO-RTO: 0 /100 WBCS
PLATELET # BLD AUTO: 430 THOUSANDS/UL (ref 149–390)
PLATELET BLD QL SMEAR: ABNORMAL
PMV BLD AUTO: 8.6 FL (ref 8.9–12.7)
POIKILOCYTOSIS BLD QL SMEAR: PRESENT
POLYCHROMASIA BLD QL SMEAR: PRESENT
POTASSIUM SERPL-SCNC: 3.7 MMOL/L (ref 3.5–5.3)
RBC # BLD AUTO: 3.63 MILLION/UL (ref 3.81–5.12)
RBC MORPH BLD: PRESENT
SODIUM SERPL-SCNC: 142 MMOL/L (ref 136–145)
VARIANT LYMPHS # BLD AUTO: 1 %
WBC # BLD AUTO: 6.45 THOUSAND/UL (ref 4.31–10.16)

## 2019-12-06 PROCEDURE — 93971 EXTREMITY STUDY: CPT | Performed by: SURGERY

## 2019-12-06 PROCEDURE — 99231 SBSQ HOSP IP/OBS SF/LOW 25: CPT | Performed by: INTERNAL MEDICINE

## 2019-12-06 PROCEDURE — 80048 BASIC METABOLIC PNL TOTAL CA: CPT | Performed by: INTERNAL MEDICINE

## 2019-12-06 PROCEDURE — 99232 SBSQ HOSP IP/OBS MODERATE 35: CPT | Performed by: INTERNAL MEDICINE

## 2019-12-06 PROCEDURE — 86618 LYME DISEASE ANTIBODY: CPT | Performed by: INTERNAL MEDICINE

## 2019-12-06 PROCEDURE — 85007 BL SMEAR W/DIFF WBC COUNT: CPT | Performed by: INTERNAL MEDICINE

## 2019-12-06 PROCEDURE — 93971 EXTREMITY STUDY: CPT

## 2019-12-06 PROCEDURE — 85027 COMPLETE CBC AUTOMATED: CPT | Performed by: INTERNAL MEDICINE

## 2019-12-06 RX ORDER — DIPHENHYDRAMINE HYDROCHLORIDE, ZINC ACETATE 2; .1 G/100G; G/100G
CREAM TOPICAL 3 TIMES DAILY PRN
Status: DISCONTINUED | OUTPATIENT
Start: 2019-12-06 | End: 2019-12-06

## 2019-12-06 RX ORDER — POTASSIUM CHLORIDE 20 MEQ/1
40 TABLET, EXTENDED RELEASE ORAL ONCE
Status: COMPLETED | OUTPATIENT
Start: 2019-12-06 | End: 2019-12-06

## 2019-12-06 RX ORDER — DIPHENHYDRAMINE HCL 25 MG
25 TABLET ORAL EVERY 6 HOURS PRN
Status: DISCONTINUED | OUTPATIENT
Start: 2019-12-06 | End: 2019-12-07 | Stop reason: HOSPADM

## 2019-12-06 RX ORDER — COLCHICINE 0.6 MG/1
0.6 TABLET ORAL DAILY
Status: DISCONTINUED | OUTPATIENT
Start: 2019-12-07 | End: 2019-12-07 | Stop reason: HOSPADM

## 2019-12-06 RX ADMIN — SODIUM CHLORIDE 75 ML/HR: 0.9 INJECTION, SOLUTION INTRAVENOUS at 05:43

## 2019-12-06 RX ADMIN — OXYCODONE HYDROCHLORIDE 5 MG: 5 TABLET ORAL at 14:31

## 2019-12-06 RX ADMIN — DIPHENHYDRAMINE HCL 25 MG: 25 TABLET, COATED ORAL at 11:58

## 2019-12-06 RX ADMIN — FAMOTIDINE 20 MG: 20 TABLET ORAL at 18:03

## 2019-12-06 RX ADMIN — ENOXAPARIN SODIUM 40 MG: 40 INJECTION SUBCUTANEOUS at 08:24

## 2019-12-06 RX ADMIN — PREDNISONE 3 MG: 1 TABLET ORAL at 08:30

## 2019-12-06 RX ADMIN — COLCHICINE 0.6 MG: 0.6 TABLET, FILM COATED ORAL at 08:25

## 2019-12-06 RX ADMIN — POLYETHYLENE GLYCOL 3350 17 G: 17 POWDER, FOR SOLUTION ORAL at 08:24

## 2019-12-06 RX ADMIN — NORTRIPTYLINE HYDROCHLORIDE 50 MG: 50 CAPSULE ORAL at 21:17

## 2019-12-06 RX ADMIN — DOCUSATE SODIUM 100 MG: 100 CAPSULE, LIQUID FILLED ORAL at 18:04

## 2019-12-06 RX ADMIN — ASPIRIN 325 MG ORAL TABLET 650 MG: 325 PILL ORAL at 21:17

## 2019-12-06 RX ADMIN — PSYLLIUM HUSK 1 PACKET: 3.4 POWDER ORAL at 08:24

## 2019-12-06 RX ADMIN — METHYLNALTREXONE BROMIDE 8 MG: 8 INJECTION, SOLUTION SUBCUTANEOUS at 11:49

## 2019-12-06 RX ADMIN — LEVOTHYROXINE SODIUM 25 MCG: 25 TABLET ORAL at 05:20

## 2019-12-06 RX ADMIN — FAMOTIDINE 20 MG: 20 TABLET ORAL at 08:25

## 2019-12-06 RX ADMIN — GABAPENTIN 300 MG: 300 CAPSULE ORAL at 08:25

## 2019-12-06 RX ADMIN — OXYCODONE HYDROCHLORIDE 2.5 MG: 10 TABLET ORAL at 05:53

## 2019-12-06 RX ADMIN — CEFAZOLIN SODIUM 2000 MG: 2 SOLUTION INTRAVENOUS at 18:03

## 2019-12-06 RX ADMIN — SODIUM CHLORIDE 75 ML/HR: 0.9 INJECTION, SOLUTION INTRAVENOUS at 19:26

## 2019-12-06 RX ADMIN — CEFAZOLIN SODIUM 2000 MG: 2 SOLUTION INTRAVENOUS at 09:43

## 2019-12-06 RX ADMIN — OXYCODONE HYDROCHLORIDE 2.5 MG: 10 TABLET ORAL at 21:16

## 2019-12-06 RX ADMIN — GABAPENTIN 300 MG: 300 CAPSULE ORAL at 21:17

## 2019-12-06 RX ADMIN — CEFAZOLIN SODIUM 2000 MG: 2 SOLUTION INTRAVENOUS at 01:44

## 2019-12-06 RX ADMIN — ASPIRIN 325 MG ORAL TABLET 650 MG: 325 PILL ORAL at 08:25

## 2019-12-06 RX ADMIN — POTASSIUM CHLORIDE 40 MEQ: 1500 TABLET, EXTENDED RELEASE ORAL at 12:01

## 2019-12-06 RX ADMIN — ASPIRIN 325 MG ORAL TABLET 650 MG: 325 PILL ORAL at 18:03

## 2019-12-06 RX ADMIN — DIPHENHYDRAMINE HCL 25 MG: 25 TABLET, COATED ORAL at 19:24

## 2019-12-06 RX ADMIN — FOLIC ACID 1000 MCG: 1 TABLET ORAL at 08:25

## 2019-12-06 RX ADMIN — GABAPENTIN 300 MG: 300 CAPSULE ORAL at 18:03

## 2019-12-06 RX ADMIN — DOCUSATE SODIUM 100 MG: 100 CAPSULE, LIQUID FILLED ORAL at 08:25

## 2019-12-06 NOTE — PROGRESS NOTES
Pt resting comfortably in bed  IV infusing freely, denies discomfort at this time, call bell within reach, will continue to monitor

## 2019-12-06 NOTE — PROGRESS NOTES
Progress Note - Infectious Disease   Geetha Colon 75 y o  female MRN: [id number]  Unit/Bed#: -01 Encounter: [id number]      Impression:  1  LLE cellulitis in immunosuppressed host  2  RA on methotrexate and prednisone   3  Pericarditis     Recommendations:  Afebrile today with less pain left lower extremity  ASO titer is negative  WBC count is now normal  1  Could switch cefazolin to cephalexin 500 mg q i d  p o  X 5 days as outpatient  2  Warm pack left lower leg t i d  As tolerated    Antibiotics:  1  Cefazolin 2 g q 8 hours IV, day 6 Rx    Subjective:  Less pain and discomfort in the LLE  Denies fevers, chills, or sweats  Denies nausea, vomiting, or diarrhea  Objective:  Vitals:  Temp:  [97 4 °F (36 3 °C)-98 °F (36 7 °C)] 97 8 °F (36 6 °C)  HR:  [81-88] 88  Resp:  [18-20] 18  BP: (138-149)/(86-90) 138/86  SpO2:  [91 %-100 %] 98 %  Temp (24hrs), Av 7 °F (36 5 °C), Min:97 4 °F (36 3 °C), Max:98 °F (36 7 °C)  Current: Temperature: 97 8 °F (36 6 °C)    Physical Exam:     General Appearance:  Alert, nontoxic, no acute distress  Throat: Oropharynx moist without lesions  Lips, mucosa, and tongue normal   Neck: Supple, symmetrical, trachea midline, no adenopathy,  no tenderness/mass/nodules   Lungs:   Clear to auscultation bilaterally, no audible wheezes, rhonchi or rales; respirations unlabored   Heart:  Regular rate and rhythm, S1, S2 normal, no murmur, rub or gallop   Abdomen:   Soft, non-tender, non-distended, positive bowel sounds  No masses, no organomegaly    No CVA tenderness   Extremities: LLE with trace macular erythema that is no longer tender or warm     The thigh involvement is esolved   Skin: As above, surgical scars         Invasive Devices     Peripheral Intravenous Line            Peripheral IV 19 Right Forearm less than 1 day                Labs, Imaging, & Other studies:   All pertinent labs were personally reviewed  Results from last 7 days   Lab Units 19  1753 12/05/19  0550 12/04/19  0531   WBC Thousand/uL 6 45 6 69 7 25   HEMOGLOBIN g/dL 10 4* 10 6* 11 0*   PLATELETS Thousands/uL 430* 380 313     Results from last 7 days   Lab Units 12/06/19  0553 12/04/19  0531 12/03/19  0430  12/01/19  1401  11/30/19  1338   SODIUM mmol/L 142 142 137   < > 136   < > 137   POTASSIUM mmol/L 3 7 4 2 3 4*   < > 3 6   < > 4 8   CHLORIDE mmol/L 112* 113* 108   < > 105   < > 104   CO2 mmol/L 24 22 25   < > 27   < > 28   BUN mg/dL 8 11 8   < > 13   < > 15   CREATININE mg/dL 0 52* 0 55* 0 52*   < > 0 64   < > 0 71   EGFR ml/min/1 73sq m 94 92 94   < > 88   < > 84   CALCIUM mg/dL 7 8* 8 3 7 8*   < > 8 0*   < > 9 3   AST U/L  --   --   --   --  24  --  53*   ALT U/L  --   --   --   --  23  --  32   ALK PHOS U/L  --   --   --   --  97  --  87    < > = values in this interval not displayed  Results from last 7 days   Lab Units 12/02/19  1716 11/30/19  1825 11/30/19  1747 11/30/19  1427   BLOOD CULTURE  No Growth at 72 hrs  No Growth at 72 hrs  No Growth After 5 Days  No Growth After 5 Days    --    URINE CULTURE   --   --   --  >100,000 cfu/ml Proteus mirabilis*  30,000-39,000 cfu/ml Escherichia coli*   MRSA CULTURE ONLY   --  No Methicillin Resistant Staphlyococcus aureus (MRSA) isolated  --   --

## 2019-12-06 NOTE — PROGRESS NOTES
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)    Abnormal EKG    Urinary tract infection due to Proteus    Myopericarditis    Drug-induced constipation  1  Sepsis - POA  -  resolved  -likely secondary to cellulitis now her urine is positive for P Mirabilis asymptomatic   patient presented with leukocytosis and tachycardia with suspicion for infection of lower extremity given pain, swelling and erythema likely secondary to strep cellulitis     No evidence of purulence, or crepitus   Patient received 1 g IV ceftriaxone in the ED   Currently afebrile  - x-ray ankle, foot, tib-fib of left extremity showed soft tissue swelling No fluid collection or gas  - blood culture x2 are negative preliminary   repeated blood culture for fever preliminary negative as well   - vancomycin stopped per ID and  continue cefazolin 2 g Q 8 per ID day 8 today likely last day will discuss with ID   - IV fluids NS  at 75cc/hour   - MRSA culture negative  - Oxycodone for pain 2 5mg for severe pain and oxycodone 5mg for breakthrough pain    - Erythema improving in left lower ext  - will get duplex ultrasound left lower extremity given swelling before discharge and the swelling likely due to immobility being in the bed   - encourage ambulation with nursing staff discussed with patient   - No crepitus noted on exam  Motor function in left foot and toes intact, pulses intact  -LRINEC score for nec fasc is 0, low risk, CT left foot and leg showed same finding as Xray  - will check a Lyme antibody to exclude other potential causes           2  Rheumatoid arthritis   - prednisone 3 mg a day  - methotrexate once a week every Monday-10mg  -LISA titer greater than 1280 anti histone pending   - skin care plan noted per RN  - chest x-ray with atelectasis  -  continue incentive spirometer with nursing communication distress adherence         3  Myopericarditis  · Sharp chest pain, elevated troponin and C-reactive protein with EKG multiple ST elevation and MN depression discussed with Cardiology  · On exam pericardial rub  · Consulted Cardiology appreciate recommendation  · Echo with normal EF 60% no regional wall motion abnormality there is mild systolic anterior motion of the anterior leaflet of the mitral valve with trace regurgitation  Suffern Cue is no pericardial effusion  · Continue  aspirin 650 mg mg t i d per Cardiology  · Colchicine 0 6 mg twice a day  · Pepcid 20 mg b i d  To prevent peptic ulcer disease while on high-dose aspirin  · Discussed above plan with Cardiology and family over the phone-Mary Beth her granddaughter at [phone] as requested by patient                     4  SLE with pericarditis new diagnosis  - Malar rash - rash does appear to spare the nasal labial fold now improving almost resolved  Simmie Dyers  significantly elevated more than 12,00  - Anti double stranded DNA  > 22 normal 0-9 , and anti histone 1 9 with normal value 0-0 9 which is moderate positive less likely lupus induced malar rash  - she is already on methotrexate and prednisone for rheumatoid arthritis  - discussed diagnosis with family- Mikey and patient  as patient requested to update family  - already on treatment for myopericarditis as above  - will hold hydroxychloroquine and defer for outpatient rheumatologist         5  Hypothyroidism  - continue levothyroxine      6  UTI due to P  Mirabilis  · POA likely colonized given being asymptomatic  · Already on Cefazolin 2 g as above   · Sensitive to cefazolin      7  Left foot acquired deformity  · Likely due to rheumatoid arthritis  · Consulted podiatric who did bedside dressing   Likely address brace placement as outpatient      8  Constipation  · Likely due to opioid  · Will try bisacodyl suppository p r n  And MiraLax  ·  Relistor subcu p r n  Every other day responded with small-bowel moving         9  RBBB on EKG        10  Hypokalemia  · Resolved with replacement  · Check magnesium normal  · Follow-up BMP daily most recent potassium 3 7 will give another dose 40 mEq KCl      11  Diffuse body rash  · Likely due to allergic reaction to cefazolin or other medication  · Benadryl 25 mg p r n  For itching and rash    #   Physical therapy recommended rehab however patient refused and reported sister support and help at home, will likely discharge home with visiting nursing service  Disposition:  Patient is likely to be discharged home today  Subjective:   Patient seen and examined at bedside this morning  No acute events occurred overnight  Patient is feeling same as yesterday without complete resolution of symptoms of swelling of the left leg, reported discomfort of the left leg however the rash improved  This morning she developed diffuse truncal rash likely hypersensitivity reaction to cefazolin  She remains afebrile denied fever or chills  Vitals: Temp (24hrs), Av °F (36 7 °C), Min:97 4 °F (36 3 °C), Max:98 7 °F (37 1 °C)  Current: Temperature: (!) 97 4 °F (36 3 °C)  Vitals:    19 1506 19 2140 19 2145 19 0711   BP: 128/74 149/90 149/90 139/87   Pulse:  84 82 81   Resp:  20  18   Temp: 97 8 °F (36 6 °C) 98 °F (36 7 °C)  (!) 97 4 °F (36 3 °C)   TempSrc:       SpO2:  100% 91% 98%   Weight:       Height:        Body mass index is 19 53 kg/m²  I/O last 24 hours: In: 1440 [P O :290;  I V :1150]  Out: 300 [Urine:300]      Physical Exam: /87   Pulse 81   Temp (!) 97 4 °F (36 3 °C)   Resp 18   Ht 5' (1 524 m)   Wt 45 4 kg (100 lb)   SpO2 98%   BMI 19 53 kg/m² General appearance: alert and oriented, in no acute distress  Head: Normocephalic, without obvious abnormality, atraumatic  Back: Diffuse rash on the back  Heart: regular rate and rhythm, S1, S2 normal and friction rub heard Left lower sternal border  Abdomen: Soft, mildly distended, nontender, hypoactive bowel sounds  Extremities: Left lower extremity swelling, erythema improved, mild tenderness on palpation  Skin: There is diffuse maculopapular rash on the back  Neurologic: Grossly normal     Invasive Devices     Peripheral Intravenous Line            Peripheral IV 12/05/19 Right Forearm less than 1 day                          Labs:   Recent Results (from the past 24 hour(s))   CBC and differential    Collection Time: 12/06/19  5:53 AM   Result Value Ref Range    WBC 6 45 4 31 - 10 16 Thousand/uL    RBC 3 63 (L) 3 81 - 5 12 Million/uL    Hemoglobin 10 4 (L) 11 5 - 15 4 g/dL    Hematocrit 33 8 (L) 34 8 - 46 1 %    MCV 93 82 - 98 fL    MCH 28 7 26 8 - 34 3 pg    MCHC 30 8 (L) 31 4 - 37 4 g/dL    RDW 15 2 (H) 11 6 - 15 1 %    MPV 8 6 (L) 8 9 - 12 7 fL    Platelets 385 (H) 268 - 390 Thousands/uL   Basic metabolic panel    Collection Time: 12/06/19  5:53 AM   Result Value Ref Range    Sodium 142 136 - 145 mmol/L    Potassium 3 7 3 5 - 5 3 mmol/L    Chloride 112 (H) 100 - 108 mmol/L    CO2 24 21 - 32 mmol/L    ANION GAP 6 4 - 13 mmol/L    BUN 8 5 - 25 mg/dL    Creatinine 0 52 (L) 0 60 - 1 30 mg/dL    Glucose 85 65 - 140 mg/dL    Calcium 7 8 (L) 8 3 - 10 1 mg/dL    eGFR 94 ml/min/1 73sq m       Radiology Results: I have personally reviewed pertinent reports  Other Diagnostic Testing:   I have personally reviewed pertinent reports          Active Meds:   Current Facility-Administered Medications   Medication Dose Route Frequency    acetaminophen (TYLENOL) tablet 650 mg  650 mg Oral Q6H PRN    aspirin tablet 650 mg  650 mg Oral TID    bisacodyl (DULCOLAX) rectal suppository 10 mg  10 mg Rectal Daily PRN    ceFAZolin (ANCEF) IVPB (premix) 2,000 mg  2,000 mg Intravenous Q8H    colchicine (COLCRYS) tablet 0 6 mg  0 6 mg Oral BID    docusate sodium (COLACE) capsule 100 mg  100 mg Oral BID    enoxaparin (LOVENOX) subcutaneous injection 40 mg  40 mg Subcutaneous Daily    famotidine (PEPCID) tablet 20 mg  20 mg Oral BID    folic acid (FOLVITE) tablet 1,000 mcg  1,000 mcg Oral Daily    gabapentin (NEURONTIN) capsule 300 mg  300 mg Oral TID    levothyroxine tablet 25 mcg  25 mcg Oral Early Morning    methotrexate tablet 10 mg  10 mg Oral Weekly    nitroglycerin (NITROSTAT) SL tablet 0 4 mg  0 4 mg Sublingual Q5 Min PRN    nortriptyline (PAMELOR) capsule 50 mg  50 mg Oral HS    oxyCODONE (ROXICODONE) IR tablet 2 5 mg  2 5 mg Oral Q4H PRN    oxyCODONE (ROXICODONE) IR tablet 5 mg  5 mg Oral Q4H PRN    polyethylene glycol (MIRALAX) packet 17 g  17 g Oral Daily    predniSONE tablet 3 mg  3 mg Oral Daily    psyllium (METAMUCIL) 1 packet  1 packet Oral TID    sodium chloride 0 9 % infusion  75 mL/hr Intravenous Continuous         VTE Pharmacologic Prophylaxis: Enoxaparin (Lovenox)  VTE Mechanical Prophylaxis: sequential compression device    MD Sheron Bojorquez MD  Available on [email or web address]  THE [facility]  Internal medicine resident

## 2019-12-06 NOTE — PROGRESS NOTES
CARDIOLOGY PROGRESS NOTE     PATIENT INFORMATION     Name: Pauline Carrillo   Age & Sex: 75 y o  female   MRN: [id number]  Hospital Stay Days: 5  Unit/Bed#: -01   Encounter: [id number]    ASSESSMENT/PLAN     Principal Problem:    Cellulitis of left lower extremity  Active Problems:    Peripheral vascular disease ([address] )    Hypothyroidism    RBBB    Rheumatoid arthritis involving multiple sites with positive rheumatoid factor (HCC)    Systemic lupus erythematosus (SLE) with pericarditis ([facility])    Abnormal EKG    Urinary tract infection due to Proteus    Myopericarditis    Drug-induced constipation    Acute Myopericarditis likely 2/2 to Autoimmune etiology   Meets  At least 2 diagnostic clinical criteria including:  Pericardial friction rub come EKG changes with ST segment elevations, depressions diffusely, as well as elevated troponin in the setting of significant history of RA and hypothyroidism, As well as the absence for any other cause  -On exam today, Patient admits that chest pain has resolved since last night  Patient denies symptoms of heartburn/ acid reflux   Patient has never experienced this before  -on exam: friction rub very softly auscultated; no chest tenderness to palpation, clear breasts bilaterally  -troponin down trendin 88--> 0 75  -negative antistreptolysin O Ab, lactic acid, CK-MB  -Echo negative for pleural effusion, preserved EF of 60%  -pending Lyme serology with reflex Western blot  -C3, C4 levels wnl, will need to follow up out pt  -anti-double-stranded DNA antibodies are elevated at 22  -anti histone antibody pending  -EKG findings with possible p r   Depressions and ST changes however similar to prior EKGs in 2017  -however in the setting of elevated CRP of greater than 90, will proceed with empiric treatment of aspirin and colchicine  -Pepcid 40mg daily indicated to prevent GI side effects of medications   -patient should continue Aspirin 600mg (down titrated from 975 mg); will continue to decrease weekly with improving symptoms  -continue Colchicine 0 6mg for a total of 3 months   -Can CRP for treatment effectivity in the future however may be chronically elevated due to RA/ Hypothyroidism  -explained to patient adverse effects of medication and importance of continuing medications despite symptomatic relief     Sepsis 2/2 left lower extremity/ facial cellulitis, resolved   -focal site of possible source located on dorsal aspect of left foot, podiatry did debride the area 12/02/2019, dressed  -patient admits to continued swelling and erythema of left lower extremity with minimal improvement since yesterday   Will continue antibiotic therapy for now, monitor symptoms closely  Delayed improvement of cellulitis likely secondary to chronic immunosuppression   -vitals improved; afebrile 97 5F, improved WBC 7 25--> 6 69--> 6 45 today  -negative blood cultures x2 at 48 hours, negative MRSA culture  -venous Doppler of left lower extremities are negative for DVT or superficial thrombophlebitis  -continue cefazolin 2 g IV Q 8 per ID recommendations; input appreciated  -monitor symptomatic improvement       Uncomplicated Proteus mirabilis and E coli acute cystitis  Patient has a history of Proteus mirabilis UTI in the recent past (July 2019); initially complaining of pain on urination as well as suprapubic tenderness to palpation; improved today, without suprapubic tenderness to palpation  -UA positive for nitrates, WBCs protein and red blood cells  -culture show Proteus mirabilis supple to Cefazolin  -continue with 2g IV Cefazolin  -ID recs appreciated     Other medical problems per primary team; possible discharge anticipated today or tomorrow with p o  Antibiotics and proper follow-up with PCP    SUBJECTIVE     Patient examined at bedside in the rehab unit  Brother also present    Patient admits to a new onset rash this morning which is diffuse and pruritic on back and anterior chest and abdomen  Spoke with medical team and advised oral Benadryl  Otherwise patient admits that her lower left extremity has not improved despite IV antibiotics  Patient admits the facial swelling has reduced  Patient denies any fevers, chills, shortness of breath, chest pain, headache, blurred vision, abdominal pain  Patient denies any pain or burning on urination, or suprapubic tenderness  Last bowel movement was yesterday  Review of Systems   Constitutional: Negative  HENT: Negative  Eyes: Negative  Respiratory: Negative  Cardiovascular: Negative  Gastrointestinal: Negative  Endocrine: Negative  Genitourinary: Negative  Skin: Positive for rash (Diffuse maculopapular pruritic rash on back and anterior chest wall and abdomen)  Allergic/Immunologic: Negative  Hematological: Negative  Psychiatric/Behavioral: Negative  OBJECTIVE     Vitals:    19 1506 19 2140 19 2145 19 0711   BP: 128/74 149/90 149/90 139/87   Pulse:  84 82 81   Resp:  20  18   Temp: 97 8 °F (36 6 °C) 98 °F (36 7 °C)  (!) 97 4 °F (36 3 °C)   TempSrc:       SpO2:  100% 91% 98%   Weight:       Height:          Temperature:   Temp (24hrs), Av 7 °F (36 5 °C), Min:97 4 °F (36 3 °C), Max:98 °F (36 7 °C)    Temperature: (!) 97 4 °F (36 3 °C)  Intake & Output:  I/O        07 -  07 -  07 07 -  0700    P  O   290 120    I V  (mL/kg)  1150 (25 3)     Total Intake(mL/kg)  1440 (31 7) 120 (2 6)    Urine (mL/kg/hr) 600 (0 6) 300 (0 3) 450 (3 1)    Total Output 600 300 450    Net -600 +1140 -330           Unmeasured Urine Occurrence  1 x     Unmeasured Stool Occurrence 2 x 1 x         Weights:   IBW: 45 5 kg    Body mass index is 19 53 kg/m²  Weight (last 2 days)     None        Physical Exam   Constitutional: She is oriented to person, place, and time  She appears well-developed and well-nourished  No distress     HENT:   Head: Normocephalic and atraumatic  Right Ear: External ear normal    Left Ear: External ear normal    Nose: Nose normal    Mouth/Throat: Oropharynx is clear and moist  No oropharyngeal exudate  Eyes: Pupils are equal, round, and reactive to light  Conjunctivae and EOM are normal    Neck: Normal range of motion  Neck supple  No tracheal deviation present  No thyromegaly present  Cardiovascular: Normal rate, regular rhythm, normal heart sounds and intact distal pulses  Exam reveals no friction rub  No murmur heard  Pulmonary/Chest: Effort normal and breath sounds normal  No stridor  No respiratory distress  She has no wheezes  Abdominal: Soft  Bowel sounds are normal  She exhibits no distension  There is no tenderness  There is no guarding  Musculoskeletal: Normal range of motion  She exhibits no edema  Neurological: She is alert and oriented to person, place, and time  She displays normal reflexes  No cranial nerve deficit  Coordination normal    Skin: Skin is warm and dry  Capillary refill takes less than 2 seconds  No rash noted  She is not diaphoretic  There is erythema (Erythema and swelling of left lower extremity unchanged since prior, a new onset maculopapular rash diffusely spread over back and anterior chest wall and abdomen, preserving extremities, positive dermatographia)  No pallor  Psychiatric: She has a normal mood and affect  Her behavior is normal  Judgment and thought content normal    Nursing note and vitals reviewed  LABORATORY DATA     Labs: I have personally reviewed pertinent reports      Results from last 7 days   Lab Units 12/06/19  0553 12/05/19  0550 12/04/19  0531 12/03/19  0430 12/02/19  0506 12/01/19  0511   WBC Thousand/uL 6 45 6 69 7 25 10 51* 11 80* 13 68*   HEMOGLOBIN g/dL 10 4* 10 6* 11 0* 10 0* 9 9* 9 8*   HEMATOCRIT % 33 8* 34 2* 35 1 32 6* 32 6* 30 9*   PLATELETS Thousands/uL 430* 380 313 241 209 202   NEUTROS PCT %  --   --   --  85* 84* 85*   MONOS PCT %  --   --   --  7 8 8   MONO PCT % 3* 2* 7  --   --   --       Results from last 7 days   Lab Units 12/06/19  0553 12/04/19  0531 12/03/19  0430  12/01/19  1401  11/30/19  1338   POTASSIUM mmol/L 3 7 4 2 3 4*   < > 3 6   < > 4 8   CHLORIDE mmol/L 112* 113* 108   < > 105   < > 104   CO2 mmol/L 24 22 25   < > 27   < > 28   BUN mg/dL 8 11 8   < > 13   < > 15   CREATININE mg/dL 0 52* 0 55* 0 52*   < > 0 64   < > 0 71   CALCIUM mg/dL 7 8* 8 3 7 8*   < > 8 0*   < > 9 3   ALK PHOS U/L  --   --   --   --  97  --  87   ALT U/L  --   --   --   --  23  --  32   AST U/L  --   --   --   --  24  --  53*    < > = values in this interval not displayed  Results from last 7 days   Lab Units 12/03/19  0430   MAGNESIUM mg/dL 2 4              Results from last 7 days   Lab Units 12/01/19  1401   LACTIC ACID mmol/L 1 9     Results from last 7 days   Lab Units 12/02/19  2201 12/02/19  1840 12/02/19  1541   TROPONIN I ng/mL 0 75* 0 88* 0 83*       IMAGING & DIAGNOSTIC TESTING     Radiology Results: I have personally reviewed pertinent reports  Xr Chest Portable    Result Date: 12/3/2019  Impression: Mild bibasilar opacity, likely atelectasis  Pneumonia cannot be excluded  Workstation performed: [id number]     Xr Tibia Fibula 2 Vw Left    Result Date: 12/2/2019  Impression: No acute osseous abnormality  Soft tissue swelling as above  Osteopenia  Workstation performed: [id number]     Xr Foot 3+ Vw Left    Result Date: 12/2/2019  Impression: No acute osseous abnormality  Workstation performed: [id number]     Ct Tibia Fibula Left W Contrast    Result Date: 12/1/2019  Impression: Soft tissue swelling without organized collection or evidence of osteomyelitis Workstation performed: [id number]     Ct Foot Left W Contrast    Result Date: 12/1/2019  Impression: 1  Ulceration noted plantar to the 5th metatarsal head, without evidence of osteomyelitis 2    Soft tissue swelling without organized collection Workstation performed: [id number]     Other Diagnostic Testing: I have personally reviewed pertinent reports        ACTIVE MEDICATIONS     Current Facility-Administered Medications   Medication Dose Route Frequency    acetaminophen (TYLENOL) tablet 650 mg  650 mg Oral Q6H PRN    aspirin tablet 650 mg  650 mg Oral TID    bisacodyl (DULCOLAX) rectal suppository 10 mg  10 mg Rectal Daily PRN    ceFAZolin (ANCEF) IVPB (premix) 2,000 mg  2,000 mg Intravenous Q8H    colchicine (COLCRYS) tablet 0 6 mg  0 6 mg Oral BID    diphenhydrAMINE-zinc acetate (BENADRYL) 2-0 1 % cream   Topical TID PRN    docusate sodium (COLACE) capsule 100 mg  100 mg Oral BID    enoxaparin (LOVENOX) subcutaneous injection 40 mg  40 mg Subcutaneous Daily    famotidine (PEPCID) tablet 20 mg  20 mg Oral BID    folic acid (FOLVITE) tablet 1,000 mcg  1,000 mcg Oral Daily    gabapentin (NEURONTIN) capsule 300 mg  300 mg Oral TID    levothyroxine tablet 25 mcg  25 mcg Oral Early Morning    methotrexate tablet 10 mg  10 mg Oral Weekly    methylnaltrexone (RELISTOR) subcutaneous injection 8 mg  8 mg Subcutaneous Once    nitroglycerin (NITROSTAT) SL tablet 0 4 mg  0 4 mg Sublingual Q5 Min PRN    nortriptyline (PAMELOR) capsule 50 mg  50 mg Oral HS    oxyCODONE (ROXICODONE) IR tablet 2 5 mg  2 5 mg Oral Q4H PRN    oxyCODONE (ROXICODONE) IR tablet 5 mg  5 mg Oral Q4H PRN    polyethylene glycol (MIRALAX) packet 17 g  17 g Oral Daily    predniSONE tablet 3 mg  3 mg Oral Daily    psyllium (METAMUCIL) 1 packet  1 packet Oral TID    sodium chloride 0 9 % infusion  75 mL/hr Intravenous Continuous     VTE Pharmacologic Prophylaxis: Enoxaparin (Lovenox)  VTE Mechanical Prophylaxis: sequential compression device    ==  Stefano Lau MD  Resident, PGY-1  [facility]

## 2019-12-06 NOTE — TELEPHONE ENCOUNTER
Received call from internal medicine provider at [facility]  Requesting read on LEV to possible discharge patient today  Notified Dr Clarence Vazquez to read study

## 2019-12-07 ENCOUNTER — TELEPHONE (OUTPATIENT)
Dept: MEDSURG UNIT | Facility: HOSPITAL | Age: 74
End: 2019-12-07

## 2019-12-07 VITALS
BODY MASS INDEX: 19.63 KG/M2 | TEMPERATURE: 97.9 F | SYSTOLIC BLOOD PRESSURE: 133 MMHG | HEIGHT: 60 IN | OXYGEN SATURATION: 100 % | DIASTOLIC BLOOD PRESSURE: 84 MMHG | RESPIRATION RATE: 18 BRPM | WEIGHT: 100 LBS | HEART RATE: 85 BPM

## 2019-12-07 LAB
B BURGDOR IGG+IGM SER-ACNC: <0.91 ISR (ref 0–0.9)
BACTERIA BLD CULT: NORMAL
BACTERIA BLD CULT: NORMAL
BASOPHILS # BLD MANUAL: 0 THOUSAND/UL (ref 0–0.1)
BASOPHILS NFR MAR MANUAL: 0 % (ref 0–1)
EOSINOPHIL # BLD MANUAL: 0 THOUSAND/UL (ref 0–0.4)
EOSINOPHIL NFR BLD MANUAL: 0 % (ref 0–6)
ERYTHROCYTE [DISTWIDTH] IN BLOOD BY AUTOMATED COUNT: 15.5 % (ref 11.6–15.1)
HCT VFR BLD AUTO: 33.1 % (ref 34.8–46.1)
HGB BLD-MCNC: 10.3 G/DL (ref 11.5–15.4)
LYMPHOCYTES # BLD AUTO: 0.82 THOUSAND/UL (ref 0.6–4.47)
LYMPHOCYTES # BLD AUTO: 11 % (ref 14–44)
MCH RBC QN AUTO: 28.9 PG (ref 26.8–34.3)
MCHC RBC AUTO-ENTMCNC: 31.1 G/DL (ref 31.4–37.4)
MCV RBC AUTO: 93 FL (ref 82–98)
METAMYELOCYTES NFR BLD MANUAL: 1 % (ref 0–1)
MONOCYTES # BLD AUTO: 0.67 THOUSAND/UL (ref 0–1.22)
MONOCYTES NFR BLD: 9 % (ref 4–12)
MYELOCYTES NFR BLD MANUAL: 1 % (ref 0–1)
NEUTROPHILS # BLD MANUAL: 5.83 THOUSAND/UL (ref 1.85–7.62)
NEUTS BAND NFR BLD MANUAL: 1 % (ref 0–8)
NEUTS SEG NFR BLD AUTO: 77 % (ref 43–75)
NRBC BLD AUTO-RTO: 0 /100 WBCS
PLATELET # BLD AUTO: 431 THOUSANDS/UL (ref 149–390)
PLATELET BLD QL SMEAR: ABNORMAL
PMV BLD AUTO: 8.7 FL (ref 8.9–12.7)
RBC # BLD AUTO: 3.56 MILLION/UL (ref 3.81–5.12)
RBC MORPH BLD: NORMAL
WBC # BLD AUTO: 7.47 THOUSAND/UL (ref 4.31–10.16)

## 2019-12-07 PROCEDURE — 85027 COMPLETE CBC AUTOMATED: CPT | Performed by: INTERNAL MEDICINE

## 2019-12-07 PROCEDURE — 85007 BL SMEAR W/DIFF WBC COUNT: CPT | Performed by: INTERNAL MEDICINE

## 2019-12-07 PROCEDURE — 99231 SBSQ HOSP IP/OBS SF/LOW 25: CPT | Performed by: INTERNAL MEDICINE

## 2019-12-07 RX ORDER — ASPIRIN 325 MG
650 TABLET ORAL 3 TIMES DAILY
Qty: 180 TABLET | Refills: 0 | Status: SHIPPED | OUTPATIENT
Start: 2019-12-07 | End: 2020-02-04

## 2019-12-07 RX ORDER — DIPHENHYDRAMINE HCL 25 MG
25 TABLET ORAL EVERY 6 HOURS PRN
Qty: 30 TABLET | Refills: 0 | Status: SHIPPED | OUTPATIENT
Start: 2019-12-07 | End: 2020-04-27 | Stop reason: ALTCHOICE

## 2019-12-07 RX ORDER — POLYETHYLENE GLYCOL 3350 17 G/17G
17 POWDER, FOR SOLUTION ORAL DAILY
Qty: 14 EACH | Refills: 0 | Status: SHIPPED | OUTPATIENT
Start: 2019-12-08 | End: 2021-05-20 | Stop reason: ALTCHOICE

## 2019-12-07 RX ORDER — COLCHICINE 0.6 MG/1
0.6 TABLET ORAL DAILY
Qty: 90 TABLET | Refills: 0 | Status: SHIPPED | OUTPATIENT
Start: 2019-12-08 | End: 2020-02-04

## 2019-12-07 RX ORDER — FAMOTIDINE 20 MG/1
20 TABLET, FILM COATED ORAL 2 TIMES DAILY
Qty: 90 TABLET | Refills: 1 | Status: SHIPPED | OUTPATIENT
Start: 2019-12-07 | End: 2021-06-03 | Stop reason: HOSPADM

## 2019-12-07 RX ORDER — CEPHALEXIN 500 MG/1
500 CAPSULE ORAL EVERY 6 HOURS SCHEDULED
Qty: 20 CAPSULE | Refills: 0 | Status: SHIPPED | OUTPATIENT
Start: 2019-12-07 | End: 2019-12-12

## 2019-12-07 RX ADMIN — FAMOTIDINE 20 MG: 20 TABLET ORAL at 10:07

## 2019-12-07 RX ADMIN — CEFAZOLIN SODIUM 2000 MG: 2 SOLUTION INTRAVENOUS at 01:19

## 2019-12-07 RX ADMIN — CEFAZOLIN SODIUM 2000 MG: 2 SOLUTION INTRAVENOUS at 10:12

## 2019-12-07 RX ADMIN — ASPIRIN 325 MG ORAL TABLET 650 MG: 325 PILL ORAL at 10:06

## 2019-12-07 RX ADMIN — DOCUSATE SODIUM 100 MG: 100 CAPSULE, LIQUID FILLED ORAL at 10:07

## 2019-12-07 RX ADMIN — POLYETHYLENE GLYCOL 3350 17 G: 17 POWDER, FOR SOLUTION ORAL at 10:08

## 2019-12-07 RX ADMIN — OXYCODONE HYDROCHLORIDE 2.5 MG: 10 TABLET ORAL at 01:20

## 2019-12-07 RX ADMIN — DIPHENHYDRAMINE HCL 25 MG: 25 TABLET, COATED ORAL at 01:27

## 2019-12-07 RX ADMIN — PSYLLIUM HUSK 1 PACKET: 3.4 POWDER ORAL at 10:05

## 2019-12-07 RX ADMIN — PREDNISONE 3 MG: 1 TABLET ORAL at 10:09

## 2019-12-07 RX ADMIN — ENOXAPARIN SODIUM 40 MG: 40 INJECTION SUBCUTANEOUS at 10:07

## 2019-12-07 RX ADMIN — FOLIC ACID 1000 MCG: 1 TABLET ORAL at 10:07

## 2019-12-07 RX ADMIN — COLCHICINE 0.6 MG: 0.6 TABLET, FILM COATED ORAL at 10:06

## 2019-12-07 RX ADMIN — GABAPENTIN 300 MG: 300 CAPSULE ORAL at 10:06

## 2019-12-07 RX ADMIN — LEVOTHYROXINE SODIUM 25 MCG: 25 TABLET ORAL at 05:06

## 2019-12-07 NOTE — DISCHARGE INSTR - AVS FIRST PAGE
You have been admitted and treated for cellulitis of your left leg which she is infection of the skin treated with antibiotic  The have been treated for myopericarditis which is inflammation of the heart  You developed generalized body rash which we believe is due to 1 of the medications either Relistor which he has injection for constipation due to opioids or colchicine which is a medication for heart inflammation or less likely antibiotic  Were recommend Benadryl every 6 hour as needed for itching we anticipate the rash to resolve in 4 weeks

## 2019-12-07 NOTE — PLAN OF CARE
Problem: PAIN - ADULT  Goal: Verbalizes/displays adequate comfort level or baseline comfort level  Description  Interventions:  - Encourage patient to monitor pain and request assistance  - Assess pain using appropriate pain scale  - Administer analgesics based on type and severity of pain and evaluate response  - Implement non-pharmacological measures as appropriate and evaluate response  - Consider cultural and social influences on pain and pain management  - Notify physician/advanced practitioner if interventions unsuccessful or patient reports new pain  Outcome: Adequate for Discharge

## 2019-12-07 NOTE — PROGRESS NOTES
Progress Note - Infectious Disease   Geetha Colon 75 y o  female MRN: [id number]  Unit/Bed#: -01 Encounter: [id number]      Impression:  1  LLE cellulitis in immunosuppressed host  2  RA on methotrexate and prednisone   3  Pericarditis     Recommendations:  Afebrile and  WBC count is normal  1  Discharge on cephalexin 500 mg q i d  p o  X 5 days as outpatient  2  Warm pack left lower leg t i d  as tolerated to be continued    Antibiotics:  1  Cephalexin 500 mg q i d , day 7 total Rx    Subjective:  Denies pain in the LLE  Denies fevers, chills, or sweats  Denies nausea, vomiting, or diarrhea  Objective:  Vitals:  Temp:  [97 8 °F (36 6 °C)-97 9 °F (36 6 °C)] 97 9 °F (36 6 °C)  HR:  [83-88] 85  Resp:  [18] 18  BP: (133-139)/(84-86) 133/84  SpO2:  [98 %-100 %] 100 %  Temp (24hrs), Av 9 °F (36 6 °C), Min:97 8 °F (36 6 °C), Max:97 9 °F (36 6 °C)  Current: Temperature: 97 9 °F (36 6 °C)    Physical Exam:     General Appearance:  Alert, nontoxic, no acute distress  Throat: Oropharynx moist without lesions  Lips, mucosa, and tongue normal   Neck: Supple, symmetrical, trachea midline, no adenopathy,  no tenderness/mass/nodules   Lungs:   Clear to auscultation bilaterally, no audible wheezes, rhonchi or rales; respirations unlabored   Heart:  Regular rate and rhythm, S1, S2 normal, no murmur, rub or gallop   Abdomen:   Soft, non-tender, non-distended, positive bowel sounds  No masses, no organomegaly    No CVA tenderness   Extremities: LLE with trace macular erythema without tenderness or warmth     The thigh involvement is resolved   Skin: As above, surgical scars         Invasive Devices     Peripheral Intravenous Line            Peripheral IV 19 Right Forearm 1 day                Labs, Imaging, & Other studies:   All pertinent labs were personally reviewed  Results from last 7 days   Lab Units 19  0517 19  0553 19  0550   WBC Thousand/uL 7 47 6 45 6 69   HEMOGLOBIN g/dL 10 3* 10 4* 10 6*   PLATELETS Thousands/uL 431* 430* 380     Results from last 7 days   Lab Units 12/06/19  0553 12/04/19  0531 12/03/19  0430  12/01/19  1401  11/30/19  1338   SODIUM mmol/L 142 142 137   < > 136   < > 137   POTASSIUM mmol/L 3 7 4 2 3 4*   < > 3 6   < > 4 8   CHLORIDE mmol/L 112* 113* 108   < > 105   < > 104   CO2 mmol/L 24 22 25   < > 27   < > 28   BUN mg/dL 8 11 8   < > 13   < > 15   CREATININE mg/dL 0 52* 0 55* 0 52*   < > 0 64   < > 0 71   EGFR ml/min/1 73sq m 94 92 94   < > 88   < > 84   CALCIUM mg/dL 7 8* 8 3 7 8*   < > 8 0*   < > 9 3   AST U/L  --   --   --   --  24  --  53*   ALT U/L  --   --   --   --  23  --  32   ALK PHOS U/L  --   --   --   --  97  --  87    < > = values in this interval not displayed  Results from last 7 days   Lab Units 12/02/19  1716 11/30/19  1825 11/30/19  1747 11/30/19  1427   BLOOD CULTURE  No Growth After 4 Days  No Growth After 4 Days  No Growth After 5 Days  No Growth After 5 Days    --    URINE CULTURE   --   --   --  >100,000 cfu/ml Proteus mirabilis*  30,000-39,000 cfu/ml Escherichia coli*   MRSA CULTURE ONLY   --  No Methicillin Resistant Staphlyococcus aureus (MRSA) isolated  --   --

## 2019-12-07 NOTE — DISCHARGE INSTRUCTIONS
Pericarditis Aguda   LO QUE NECESITA SABER:   La pericarditis aguda es emmanuel inflamación del pericardio  El pericardio es el saco reyna que [place]  Estefany Quirk cantidad de líquido jose m que se encuentra entre manzano corazón y el saco le permite al corazón latir con facilidad  Con la pericarditis aguda, la cantidad de líquido [place] y podría contener pus  [place] puede conllevar a problemas con la manera en que el corazón late  [facility]CCIONES [facility]BRE [facility]:   Medicamentos:   · AINEs (Analgésicos antiinflamatorios no esteroides)  pueden disminuir la inflamación y el dolor o la fiebre  Narciso medicamento esta disponible con o sin emmanuel receta médica  Los AINEs pueden causar sangrado estomacal o problemas renales en ciertas personas  Si usted ino un medicamento anticoagulante, siempre pregúntele a manzano médico si los AVATR son seguros para usted  Siempre shanae la etiqueta de narciso medicamento y [place] instrucciones  · Antibióticos:  Narciso medicamento va a ayudar a combatir o prevenir emmanuel infección  [place] dmitry antibióticos hasta que se los termine, aunque se sienta mejor  · Esteroides:  Se administra narciso medicamento para reducir enrojecimiento, dolor, e inflamación  · [place] dmitry medicamentos hussein se le haya indicado  Consulte con manzano médico si usted leticia que manzano medicamento no le está ayudando o si presenta efectos secundarios  Infórmele si es alérgico a algún medicamento  Mantenga emmanuel lista actualizada de los [place], las vitaminas y los productos herbales que ino  Incluya los siguientes datos de los medicamentos: cantidad, frecuencia y motivo de administración  Traiga con usted la lista o los envases de la píldoras a dmitry citas de seguimiento  Lleve la lista de los medicamentos con usted en ирина de emmanuel emergencia  Acuda a dmitry consultas de control con manzaon médico según le indicaron  Anote dmitry preguntas para que se acuerde de hacerlas micky dmitry visitas     Consejos para el bienestar:   · Consuma alimentos saludables y variados:  [place] podría ayudar a que usted tenga más energía y se recupere más pronto  Los alimentos saludables incluyen fruta, vegetales, panes integrales, productos lácteos bajo en grasa, frijoles, gamal sin grasa, y pescado  Pregunte si necesita seguir emmanuel dieta especial     · [place] líquidos según dmitry indicaciones:  Los adultos deberían de beber entre 9 a 13 vasos de 8 onzas de líquidos cada día  Pregunte cuál es la cantidad [place] para usted  Para [facility], [place], los mejores líquidos son Rene Mao, y [place]  · [place] suficiente ejercicio:  Hable con bejarano médico sobre un plan de ejercicio adecuado para usted  El ejercicio podría reducir bejarano presión sanguínea y podría mejorar bejarano alexandrea  · No fume:  Si usted fuma, nunca es muy tarde para dejar de hacerlo  Si usted fuma, es más propenso a contraer cardiopatía, emmanuel enfermedad pulmonar, cáncer u otros problemas de [employer]  Dejar de fumar va a mejorar bejarano alexandrea y la alexandrea de toda persona a bejarano alrededor  Si fuma, solicite información sobre hussein dejar de [place]  · Controle el estrés:  El estrés podría disminuir la habilidad de que sane y provocar enfermedades  Aprenda nuevas maneras de relajarse hussein la respiración profunda  Pregúntele a bejarano Katelyn Hefty vitaminas y minerales son adecuados para usted  · Usted tiene fiebre  · Usted tiene preguntas o inquietudes acerca de bejarano condición o cuidado  Regrese a la kacie de emergencias si:   · Le falta el aire, y esto empeora cuando está NOKIA  · El dolor en bejarano pecho empeora o no mejora  © 2017 [address] Information is for End User's use only and may not be sold, redistributed or otherwise used for commercial purposes  All illustrations and images included in CareNotes® are the copyrighted property of A D A JACEY , Inc  or [facility]  Esta información es sólo para uso en educación   Bejarano intención no es darle un consejo [employer] o tratamientos  Colsulte con manzano Art Ping farmacéutico antes de seguir cualquier régimen médico para saber si es seguro y efectivo para usted  Celulitis   LO QUE NECESITA SABER:   La celulitis es emmanuel infección en la piel causada por bacteria  La celulitis podría desaparecer por sí mariela o puede que necesite tratamiento  Manzano médico puede dibujar un círculo alrededor de los bordes externos de manzano celulitis  Si la celulitis se extiende, manzano médico verá que se salió del círculo  INSTRUCCIONES [facility]BRE [facility]:   Llame al 911 si presenta:   · Tiene dolor en el pecho o dificultad repentina para respirar  Regrese a la kacie de emergencias si:   · Manzano herida se engrandece o tiene más dolor  · Usted siente sonidos crepitantes bajo la piel al tocarla  · Usted tiene puntos o protuberancias color leonel en manzano piel o nota gelacio debajo manzano piel  · Usted tiene emmnauel nueva inflamación o dolor en dmitry piernas  · [place] enrojecidas, cálidas e inflamadas se [address]  · Usted ve líneas marin saliendo del área infectada  Pregúntele a manzano Varun Cotta vitaminas y minerales son adecuados para usted  · Usted tiene fiebre  · Manzano fiebre o dolor no desaparecen o empeoran  · El área no se reduce después de 2 días de uso de antibióticos  · Manzano piel se escama o se pela  · Usted tiene preguntas o inquietudes acerca de manzano condición o cuidado  Medicamentos:   · Antibióticos  sirven para tratar la infección bacterial      · AINEs (Analgésicos antiinflamatorios no esteroides) hussein el ibuprofeno, ayudan a disminuir la inflamación, el dolor y la fiebre  Los AINEs pueden causar sangrado estomacal o problemas renales en ciertas personas  Si usted esta tomando un anticoágulante,  siempre  pregunte si los AINEs son seguros para usted  Siempre shanae la etiqueta de narciso medicamento y [place] instrucciones   No administre narciso medicamento a niños menores de 6 meses de jadiel sin antes obtener la autorización de manzano médico      · Acetaminofeno:  eladio el dolor y baja la fiebre  Está disponible sin receta médica  Pregunte la cantidad y la frecuencia con que debe tomarlos  [facility] [address]  Millie las etiquetas de todos los demás medicamentos que esté usando para saber si también contienen acetaminofén, o pregunte a manzano médico o farmacéutico  El acetaminofén puede causar daño en el hígado cuando no se ino de forma correcta  No use más de 4 gramos (4000 miligramos) en total de acetaminofeno en un día  · [place] dmitry medicamentos hussein se le haya indicado  Consulte con manzano médico si usted leticia que manzano medicamento no le está ayudando o si presenta efectos secundarios  Infórmele si es alérgico a cualquier medicamento  Mantenga emmanuel lista actualizada de los [employer], las vitaminas y los productos herbales que ino  Incluya los siguientes datos de los medicamentos: cantidad, frecuencia y motivo de administración  Traiga con usted la lista o los envases de la píldoras a dmitry citas de seguimiento  Lleve la lista de los medicamentos con usted en ирина de emmanuel emergencia  Cuidados personales:   · Eleve el área por encima del nivel de manzano corazón   con la frecuencia posible  [place] va a disminuir inflamación y el dolor  Coloque el área sobre almohadas o sábanas para tratar de mantenerla elevada cómodamente  · Limpie la elizabet diariamente hasta que se forme emmanuel costra sobre la herida  [place] Cliche y agua  Séquela con palmaditas  Use apósitos hussein se le haya indicado  · Coloque paños húmedos fríos o calientes en la elizabet hussein se le haya indicado  Use paños limpios y agua limpia  Déjelos en el área hasta que el paño llegue a temperatura ambiente  Seque el área con palmaditas con un paño limpio y seco  Elton Poster ayudar a disminuir el dolor  Evite la celulitis:   · No se rasque picaduras de insectos o áreas lesionadas  Rascarse estas áreas aumenta manzano riesgo de tener celulitis       · No comparta los artículos de uso personal, hussein toallas, ropa, o navajas de afeitar  · Limpie los equipos de ejercicio  con un detergente desinfectante antes y después de [place]  · Lávese las piper frecuentemente  Utilice agua y [place]  [employer] las piper después de usar el baño, cambiarle el pañal a un opal o estornudar  Lávese las piper antes de comer o preparar alimentos  Use emmanuel loción para evitar que la piel se reseque o se agriete  · Use medias de compresión hussein se le indique  Es posible que le recomienden usar las medias si tiene edema periférico  Las medias mejoran el flujo sanguíneo y [address]  · Trate el pie de atleta  [place] puede ayudar a prevenir la propagación de emmanuel infección bacteriana en la piel  Acuda a dmitry consultas de control con manzano médico dentro de 3 días o según le indicaron:  Manzano médico comprobará si la celulitis está mejorando  Es posible que necesite un medicamento diferente  Anote dmitry preguntas para que se acuerde de hacerlas micky dmitry visitas  © 2017 [address]  Information is for End User's use only and may not be sold, redistributed or otherwise used for commercial purposes  All illustrations and images included in CareNotes® are the copyrighted property of A D A M , Inc  or Natan Box  Esta información es sólo para uso en educación  Manzano intención no es darle un consejo médico sobre enfermedades o tratamientos  Colsulte con manzano Tanna Clipper farmacéutico antes de seguir cualquier régimen médico para saber si es seguro y efectivo para usted

## 2019-12-07 NOTE — DISCHARGE SUMMARY
[facility] Discharge Summary - Medical Geetha Colon 75 y o  female MRN: [id number]    [address] [place] [address] Room / Bed: /-15 Encounter: [id number]    BRIEF OVERVIEW    Admitting Provider: Jonas Flores MD  Discharge Provider: Jonas Flores MD  Primary Care Physician at Discharge: ZAIN Mtz   Consultants  · Infectious disease  · Cardiology  [address]  Admission Date: 11/30/2019     Discharge Date:  12/07/2019    Hospital Course  Briefly this is a 75years old female with past medical history of rheumatoid arthritis on methotrexate and prednisone, history of hypothyroidism, vitamin-D deficiency, and osteoporosis admitted to the hospital with a rash and swelling of the left lower extremity  # Rash and swelling left lower extremity  This is has been treated as cellulitis with Infectious Disease consultation with cefazolin for 8 days with cephalexin to be completed 5 days upon discharge  there was concern for necrotizing soft tissue infection this has been ruled out with CT scan of the left leg and clinically  There was concern for DVT this has been ruled out by duplex ultrasound  # myopericarditis  This has been suggested after patient developed pleuritic chest pain, elevated troponin, EKG with ST elevation and DE depression as well as pericardial rub on exam   Cardiology consulted agreed with colchicine for 3 months and high-dose aspirin for a month with scheduled Pepcid for peptic ulcer prophylaxis  Cardiology will follow up as outpatient arrange  # SLE new diagnosis   This has been presumed after the patient developed malar rash, LISA was significantly elevated, anti double-stranded was elevated and antihistone was borderline elevated patient was recommended to follow up with Rheumatology and primary care physician regarding this diagnosis          # Drug induced constipation  She was treated with oxycodone for pain and developed constipation did not respond to suppository or conventional methods we did not try any him however responded well to Relistor injection time 2 doses every other day  # Back rash   In fact she developed diffuse papular rash likely drug induced culprit medication could be Relistor, colchicine or less likely cefazolin  Patient was treated with Benadryl p r n  For itching and the rash and reassured the rash might resolve within a month if did not resolve while on colchicine then the colchicine will be the likely culprit  There was no acute events during this hospitalization  Patient understood the plan of discharge and agreed she was vital stable upon discharge or follow-up has been arranged in the discharge after visit summary except rheumatology would defer for primary care physician      Presenting Problem/History of Present Illness  Principal Problem:    Cellulitis of left lower extremity  Active Problems:    Peripheral vascular disease (HCC)    Hypothyroidism    RBBB    Rheumatoid arthritis involving multiple sites with positive rheumatoid factor (HCC)    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)    Abnormal EKG    Urinary tract infection due to Proteus    Myopericarditis    Drug-induced constipation    Rash of back  Resolved Problems:    Sepsis (HCC)    /84   Pulse 85   Temp 97 9 °F (36 6 °C)   Resp 18   Ht 5' (1 524 m)   Wt 45 4 kg (100 lb)   SpO2 100%   BMI 19 53 kg/m²   General appearance: alert and oriented, in no acute distress  Eyes: Non icterus no discharge  Back: Diffuse papular rash  Lungs: clear to auscultation bilaterally  Heart: regular rate and rhythm, S1, S2 normal and friction rub heard At left lower sternal border  Abdomen: Soft, mildly distended, nontender, hypoactive bowel sounds  Extremities: Left lower extremity with mild swelling and mild erythema mild tenderness with deformity in bilateral feet  Skin: Diffuse truncal maculopapular rash  Neurologic: Grossly normal    Diagnostic Procedures Performed  Imaging Studies:  VAS lower limb venous duplex study, unilateral/limited   Final Result by Nisha Alexander MD (12/06 1824)      XR chest portable   Final Result by Urvashi Santos MD (12/03 1249)      Mild bibasilar opacity, likely atelectasis  Pneumonia cannot be excluded  Workstation performed: [id number]         CT foot left w contrast   Final Result by Juan M Tuttle MD (12/01 1625)      1  Ulceration noted plantar to the 5th metatarsal head, without evidence of osteomyelitis   2  Soft tissue swelling without organized collection         Workstation performed: [id number]         CT tibia fibula left w contrast   Final Result by Juan M Tuttle MD (12/01 1619)      Soft tissue swelling without organized collection or evidence of osteomyelitis         Workstation performed: [id number]         XR foot 3+ vw left   Final Result by Marly Pemberton MD (12/02 1522)      No acute osseous abnormality  Workstation performed: [id number]         XR tibia fibula 2 vw left   Final Result by Marly Pemberton MD (12/02 0515)      No acute osseous abnormality  Soft tissue swelling as above  Osteopenia  Workstation performed: [id number]           Pertinent Labs: Anti double-stranded DNA elevated, LISA elevated, and his tone borderline elevated  Blood culture negative  Therapeutic Procedures Performed  Non    Test Results Pending at Discharge:  Lyme antibody profile with reflex to Western blot      Medications     Medication List to be Continued at Discharge  Current Discharge Medication List      CONTINUE these medications which have NOT CHANGED    Details   acetaminophen (TYLENOL) 325 mg tablet Take prn for mild pain  Qty: 30 tablet, Refills: 0    Associated Diagnoses: Arthritis of left hip      Ascorbic Acid (VITAMIN C) 1000 MG tablet Take 1,000 mg by mouth daily      cholecalciferol (VITAMIN D3) 1,000 units tablet Take 1,000 Units by mouth daily      docusate sodium (COLACE) 100 mg capsule Take 1 capsule by mouth 2 (two) times a day  Qty: 10 capsule, Refills: 0      famotidine (PEPCID) 20 mg tablet Take 1 tablet (20 mg total) by mouth 2 (two) times a day as needed for heartburn  Qty: 60 tablet, Refills: 1    Associated Diagnoses: Dyspepsia      folic acid (FOLVITE) 1 mg tablet TAKE ONE TABLET BY MOUTH EVERY DAY  Qty: 30 tablet, Refills: 2    Associated Diagnoses: RLS (restless legs syndrome)      levothyroxine 25 mcg tablet Take 1 tablet (25 mcg total) by mouth daily  Qty: 90 tablet, Refills: 1    Associated Diagnoses: Hypothyroidism, unspecified type      methotrexate 2 5 MG tablet Take by mouth 4 tablets by mouth once a week on monday      Multiple Vitamins-Minerals (CENTRUM SILVER PO) Take 1 tablet by mouth daily      nortriptyline (PAMELOR) 50 mg capsule Take 1 capsule (50 mg total) by mouth daily at bedtime  Qty: 90 capsule, Refills: 0    Associated Diagnoses: Other insomnia      predniSONE 1 mg tablet Refills: 0      Psyllium (METAMUCIL FIBER PO) Take 1 packet by mouth 3 (three) times a day        Vitamin E 400 units TABS Take 400 Units by mouth daily        gabapentin (NEURONTIN) 300 mg capsule Take 1 capsule (300 mg total) by mouth 3 (three) times a day  Qty: 270 capsule, Refills: 1    Comments: Needs 180 for frequency chg  Associated Diagnoses: Chronic pain syndrome           Current Discharge Medication List        Current Discharge Medication List          Allergies  No Known Allergies  Discharge Diet: regular diet  Activity restrictions: no driving  Discharge Condition: good  Discharged With Lines: no    Discharge Disposition: [address] / Family Member Name:   Extended Emergency Contact Information  Primary Emergency Contact: Anna Marie Gibson [address] Phone: [phone]  Relation: Sister  Secondary Emergency Contact: Elisabeth Leiva [address] Phone: [phone]  Mobile Phone: [phone]  Relation: Daughter    Outpatient Follow-Up  yes      Follow up:  Left lower extremity cellulitis and generalized body rash  Date and time:  Within 1 week  Location:  Your Primary care provider  Follow up within next:  Week      Follow up: Myopericarditis  Date and time:  Within 2 weeks  Location:  [address] [place] [address] [facility]  [address] [place] [address]  Follow up within next:  2 weeks      Code Status: Level 1 - Full Code  Advance Directive and Living Will: <no information>  Power of :    POLST:      Discharge  Statement   I spent 30 minutes minutes discharging the patient  This time was spent on the day of discharge  I had direct contact with the patient on the day of discharge  Additional documentation is required if more than 30 minutes were spent on discharge       Naheed Garza MD  Available on [place]  THE [facility]  Internal medicine resident

## 2019-12-09 ENCOUNTER — TELEPHONE (OUTPATIENT)
Dept: CARDIOLOGY CLINIC | Facility: CLINIC | Age: 74
End: 2019-12-09

## 2019-12-09 ENCOUNTER — TRANSITIONAL CARE MANAGEMENT (OUTPATIENT)
Dept: INTERNAL MEDICINE CLINIC | Facility: CLINIC | Age: 74
End: 2019-12-09

## 2019-12-09 NOTE — TELEPHONE ENCOUNTER
You saw this pt in the hospital on 12/3/19  Her AVS said to get Echo as outpt  She had one on 12/2/19  Did you want a repeat? Echo's are being scheduled into January  F/U appt in the office 2 weeks  Does she need Echo before that F/U?         Please advise

## 2019-12-10 DIAGNOSIS — I31.3 PERICARDIAL EFFUSION: Primary | ICD-10-CM

## 2019-12-11 RX ORDER — POLYETHYLENE GLYCOL 3350 17 G/17G
POWDER, FOR SOLUTION ORAL
Refills: 0 | COMMUNITY
Start: 2019-12-07 | End: 2020-08-25

## 2019-12-11 RX ORDER — DIPHENHYDRAMINE HCL 25 MG/1
CAPSULE ORAL
Refills: 0 | COMMUNITY
Start: 2019-12-07 | End: 2020-04-27 | Stop reason: ALTCHOICE

## 2019-12-12 ENCOUNTER — PATIENT OUTREACH (OUTPATIENT)
Dept: INTERNAL MEDICINE CLINIC | Facility: CLINIC | Age: 74
End: 2019-12-12

## 2019-12-12 ENCOUNTER — OFFICE VISIT (OUTPATIENT)
Dept: INTERNAL MEDICINE CLINIC | Facility: CLINIC | Age: 74
End: 2019-12-12

## 2019-12-12 VITALS
HEART RATE: 84 BPM | WEIGHT: 102.29 LBS | BODY MASS INDEX: 21.47 KG/M2 | HEIGHT: 58 IN | SYSTOLIC BLOOD PRESSURE: 142 MMHG | TEMPERATURE: 98.1 F | DIASTOLIC BLOOD PRESSURE: 82 MMHG

## 2019-12-12 DIAGNOSIS — L03.116 CELLULITIS OF LEFT LOWER EXTREMITY: Primary | ICD-10-CM

## 2019-12-12 DIAGNOSIS — I31.9 MYOPERICARDITIS: ICD-10-CM

## 2019-12-12 DIAGNOSIS — R21 RASH OF BACK: ICD-10-CM

## 2019-12-12 DIAGNOSIS — R76.8 POSITIVE ANA (ANTINUCLEAR ANTIBODY): ICD-10-CM

## 2019-12-12 DIAGNOSIS — M05.79 RHEUMATOID ARTHRITIS INVOLVING MULTIPLE SITES WITH POSITIVE RHEUMATOID FACTOR (HCC): ICD-10-CM

## 2019-12-12 DIAGNOSIS — Z09 HOSPITAL DISCHARGE FOLLOW-UP: Chronic | ICD-10-CM

## 2019-12-12 DIAGNOSIS — S90.425D BLISTER OF FIFTH TOE OF LEFT FOOT, SUBSEQUENT ENCOUNTER: ICD-10-CM

## 2019-12-12 PROBLEM — S90.425A: Status: ACTIVE | Noted: 2019-12-12

## 2019-12-12 PROCEDURE — 99496 TRANSJ CARE MGMT HIGH F2F 7D: CPT | Performed by: PHYSICIAN ASSISTANT

## 2019-12-12 RX ORDER — CEPHALEXIN 500 MG/1
500 CAPSULE ORAL EVERY 6 HOURS SCHEDULED
Qty: 20 CAPSULE | Refills: 0 | Status: SHIPPED | OUTPATIENT
Start: 2019-12-12 | End: 2019-12-19 | Stop reason: SDUPTHER

## 2019-12-12 NOTE — PROGRESS NOTES
Outpatient Care Management Note:    Patient was inpatient at [facility] from 11/30 - 12/7/19 for cellulitis of left lower extremity  Patient was initially a bundle paint but final DRG made patient ineligible for bundle episode  Yun Montanez LPN made initial hospital follow up call  Patient came into PCP office today  I spoke with PCP Sekou Stevens PA-C and made her aware of referral made for outpatient care management  She does not feel patient needs further care management at this time  Patient follows up mainly with Rheumatology office  Will close from care management at this time

## 2019-12-12 NOTE — PROGRESS NOTES
Assessment/Plan:  As per our review you have not been taking the antibiotics as prescribed at home  To make sure that you have the complete course I have sent a new prescription for the Keflex 4 times per day for the next 5 days  We reviewed that if the swelling in her left leg is not improving, the redness is returning or developing increased pain to the area please contact our office or return to the emergency room  We also reviewed the importance of keeping your left leg elevated when sitting  You confirm taking the colchicine and higher dose aspirin  You are aware that you are scheduled for follow-up cardiac imaging and an appointment with the cardiologist   Saroj Barnett did not have any chest pain on your visit today and aware to contact our office or your cardiologist or return to ER if you do develop any chest pain  As per reviewed the blister on your left small toe has significantly improved  We have also provided you with a referral to schedule follow-up with the podiatrist to make sure there is resolution of this initial blister as well as possible bracing or orthotics for your foot deformities secondary to rheumatoid arthritis  You confirm you contacted your rheumatologist for an earlier appointment to discuss the possibility of also having lupus with your known rheumatoid arthritis  You confirm the rash has significantly improved  You only have some mild itching and only taking Benadryl if needed  You are aware that if the rash is not improving or gets any worse to contact our office and we may need to discontinue the colchicine  No problem-specific Assessment & Plan notes found for this encounter  Diagnoses and all orders for this visit:    Cellulitis of left lower extremity  -     cephalexin (KEFLEX) 500 mg capsule;  Take 1 capsule (500 mg total) by mouth every 6 (six) hours for 5 days    Hospital discharge follow-up    Myopericarditis    Rheumatoid arthritis involving multiple sites with positive rheumatoid factor (HCC)    Blister of fifth toe of left foot, subsequent encounter  -     Ambulatory referral to Podiatry; Future    Rash of back    Positive LISA (antinuclear antibody)    Other orders  -     CLEARLAX powder; MIX 17 GRAMS (CAPFUL) WITH LIQUID AND DRINK DAILY  -     DIPHENHIST 25 MG capsule; TAKE ONE CAPSULE BY MOUTH EVERY 6 HOURS AS NEEDED FOR ITCHING (SKIN RASH)          Subjective:      Patient ID: Richardson Mehta is a 75 y o  female  Patient here for hospital follow-up visit  Patient was admitted from November 30th through December 7th  Patient had presented with a rash and swelling of her left lower extremity  Secondary to patient's medical conditions and medications infectious Disease was consulted and determined that she did have cellulitis  There was some concern for underlying soft tissue infection however CT scan ruled that out  Per Infectious Disease patient was to complete 8 days of cephazolin and 5 days additional of cephalexin after discharge  Patient thinks that she had Keflex at home  Reviewed with patient that based on timing she should have completed the 5 days either yesterday or 1st thing this morning  She does confirm however she was not taking the medication 4 times daily she believe she was only taking it once or twice daily  Patient confirm she still has redness and swelling to her left lower extremity but does admit that it has significantly improved since presentation to the hospital   She also reports that she has not been elevating her legs while sitting  Patient reports that pain is also significantly improved  She does have some discomfort with a full sensation in her left leg but denies pain  Patient denies any fever chills  Patient was unclear on cause of cellulitis  Reviewed with patient that it is noted in her hospital notes that she had a blister to the lateral side of her left small toe    That open wound was likely the cause of the cellulitis  Patient was consulted by Podiatry while inpatient  Wound was addressed  It was noted to have significantly improved with a small scab  Patient does request to follow up with Podiatry  Patient denies any pain to that area of her left foot today  As noted today and on all previous visits when I have seen patient she is wearing open sandals that have Velcro closures  Patient reports she wears the secondary to her rheumatoid arthritis as they are much easier for her to get on and off  This sandals are closed fairly tightly and this is likely the cause of the blister on her left toe initially  There was also some concern for myopericarditis as patient developed pleuritic type chest pain cardiac evaluation did demonstrate an elevated troponin as well as ST elevations  A rub was auscultated on exam   Cardiology was then consulted who agreed that patient would need to be on colchicine for 3 months with higher dose of aspirin for 1 month and to follow up with the cardiologist outpatient  Because patient already had some history of acid reflux in the past patient would be taking Pepcid on a regular basis to help prevent worsening or development of ulcers  As noted patient is currently scheduled for follow-up echocardiogram in January and a follow-up appointment with the cardiologist the 1st week of February  Currently patient denies any chest pain no shortness of breath at rest   Per inpatient consultation with Cardiology felt secondary to her autoimmune conditions  Patient also developed constipation while in the hospital   She did not respond to suppositories or pills and therefore she was trialed on Relistor with 2 doses every other day  Per hospital course she responded well to this  States when she takes her metamucil and MOM pills no constipation at home  Constipation felt secondary to pain medications  Patient denies constipation at this time      It was however noted that patient did develop a diffuse maculopapular rash most likely consistent with a drug rash  Patient was treated with Benadryl p r n  Relistor suspected  For the itching and the rash and was advised that the rash might take up to a month to resolve however if it did not improve after that then it was most likely secondary to the colchicine  Still itchy but is improving  Patient reports she no longer has a rash she does have some slight itching but as noted that has also significantly improved  She is aware of the information that if the rash was due to the injections for constipation it could take up to a month to resolve  Patient also confirms information that if her itching and or rash does not resolve within 1 month than it was more likely secondary to the colchicine and that would have to be discontinued  Patient at this time denies any new rash, no difficulty breathing  No rash itching swelling to her mouth  Patient also had additional evaluations while in the hospital as during her stay she developed a Malar rash  She was found to have significantly elevated LISA the double stranded antibody was borderline  As patient is already followed by Rheumatology for her rheumatoid arthritis patient advised to further discuss this with her rheumatologist to determine if she also has lupus as well as her rheumatoid arthritis  Aware of this information and has earlier appointment with Dr Carole Finney , thinks early January  Patient overall notes improvements since discharge from the hospital   We did review that she still has some swelling and slight redness to her left lower extremity but as noted per patient improvement  We also reviewed that she had not been taking the antibiotics as prescribed  As patient did not confirm if she had the full dose at home would represcribed the Keflex for the 5 full days  Patient was also advised that if the redness or swelling in her left leg did not continue to improve    Got worse or any new symptoms to contact our office or return to the emergency room  Patient did not have any additional questions at the end of our visit  TCM Call (since 11/11/2019)     Date and time call was made  12/9/2019  2:37 PM    Hospital care reviewed  Records reviewed    Patient was hospitial[facility] at  [facility]    Date of Admission  11/30/19    Date of discharge  12/07/19    Diagnosis  CELLULITIS OF LLE - L03  116    Disposition  Home <img src='C:FILES   (X86)style='border:0px;vertical-align:middle; '/> LIVES WITH HER SISTER    Were the patients medications reviewed and updated  Yes    Current Symptoms  Leg pain - left side <img src='C:FILES   (X86)style='border:0px;vertical-align:middle; '/> PATIENT HAS A RASH ON   HER BACK THAT SHE DEVELOPED IN THE HOSPITAL  SHE SAID IT IS ITCHY   SOMETIMES, OTHERWISE FINE  SHE IS TAKING BENADRYL FOR THE ITCHING    Left side leg pain severity  Mild    Leg pain, left side, onset  Ongoing <img src='C:FILES   (X86)style='border:0px;vertical-align:middle; '/> PT  IS HAVING EDEMA IN   LOWER LEFT LEG  SHE STATED IT IS THE SAME AS USUAL      TCM Call (since 11/11/2019)     Post hospital issues  Reduced activity    Should patient be enrolled in anticoag monitoring? No    Scheduled for follow up? Yes <img src='C:FILES   (X86)style='border:0px;vertical-align:middle; '/> 12/12/19 WITH NORMAN ODONNELL    Did you obtain your prescribed medications  Yes <img src='C:FILES   (X86)style='border:0px;vertical-align:middle; '/> [address] [place] [address]  Do you need help managing your prescriptions or medications  No <img   src='C:FILES (X86)style='border:0px;vertical-align:middle; '/> PT  IS ABLE   TO MANAGE HER OWN MEDS    Is transportation to your appointment needed  No <img src='C:FILES   (X86)style='border:0px;vertical-align:middle; '/> PTS   BROTHER DRIVES HER   TO APPOINTMENTS    I have advised the patient to call PCP with any new or worsening symptoms Gisele Paling, LPN    Living Arrangements  Family members    Are you recieving any outpatient services  Yes    What type of services  IRON INFUSIONS EVERY 6 MONTHS    Are you recieving home care services  No    Are you using any community resources  No    Have you fallen in the last 12 months  No    Interperter language line needed  No <img src='C:FILES   (X86)style='border:0px;vertical-align:middle; '/> PT  SPEAKS ENGLISH    Counseling  Patient    Counseling topics  instructions for management; Importance of RX   compliance              The following portions of the patient's history were reviewed and updated as appropriate: allergies, current medications, past family history, past medical history, past social history, past surgical history and problem list     Review of Systems   Constitutional: Negative  Negative for chills, fever and unexpected weight change  HENT: Negative  Negative for facial swelling and trouble swallowing  Respiratory: Negative  Negative for cough, chest tightness and shortness of breath  Cardiovascular: Positive for leg swelling  Negative for chest pain and palpitations  Gastrointestinal: Negative for abdominal pain and constipation  Endocrine: Positive for polyuria (not new)  Genitourinary: Negative  Negative for dysuria and urgency  Musculoskeletal: Positive for arthralgias, back pain, joint swelling and myalgias  Skin: Positive for color change and rash  Neurological: Negative for seizures, syncope and weakness  Psychiatric/Behavioral: Negative  Objective:      /82 (BP Location: Left arm, Patient Position: Sitting, Cuff Size: Adult)   Pulse 84   Temp 98 1 °F (36 7 °C) (Oral)   Ht 4' 10" (1 473 m)   Wt 46 4 kg (102 lb 4 7 oz)   BMI 21 38 kg/m²          Physical Exam   Constitutional: She appears well-developed  No distress  HENT:   Mouth/Throat: Oropharynx is clear and moist  No oropharyngeal exudate     Eyes: Conjunctivae are normal  Neck: No JVD present  No thyromegaly present  Cardiovascular: Normal rate and regular rhythm  Exam reveals friction rub  Pulmonary/Chest: Effort normal and breath sounds normal  She has no wheezes  She has no rales  Abdominal: Bowel sounds are normal  She exhibits no distension  There is no tenderness  Musculoskeletal: She exhibits edema  Skin: No rash noted  No maculopapular rash noted on exam   Left lower extremity has very mild erythematous hyper pigmentation  No increased temperature to touch  No open wounds or lesions  As noted consistent with her cellulitis and patient reports significant improvement as compared to hospital admission  Patient does still have lower extremity swelling on left as compared to right  As noted patient had CT scan and Doppler with no evidence of DVTs  Left lateral 5th toe with closed almost completely healed blister  No open wound or erythema at this site  Psychiatric: She has a normal mood and affect  Her behavior is normal    Nursing note and vitals reviewed

## 2019-12-16 PROBLEM — R76.8 POSITIVE ANA (ANTINUCLEAR ANTIBODY): Status: ACTIVE | Noted: 2019-12-16

## 2019-12-16 NOTE — PATIENT INSTRUCTIONS
As per our review you have not been taking the antibiotics as prescribed at home  To make sure that you have the complete course I have sent a new prescription for the Keflex 4 times per day for the next 5 days  We reviewed that if the swelling in her left leg is not improving, the redness is returning or developing increased pain to the area please contact our office or return to the emergency room  We also reviewed the importance of keeping your left leg elevated when sitting  You confirm taking the colchicine and higher dose aspirin  You are aware that you are scheduled for follow-up cardiac imaging and an appointment with the cardiologist   Brian Castrejon did not have any chest pain on your visit today and aware to contact our office or your cardiologist or return to ER if you do develop any chest pain  As per reviewed the blister on your left small toe has significantly improved  We have also provided you with a referral to schedule follow-up with the podiatrist to make sure there is resolution of this initial blister as well as possible bracing or orthotics for your foot deformities secondary to rheumatoid arthritis  You confirm you contacted your rheumatologist for an earlier appointment to discuss the possibility of also having lupus with your known rheumatoid arthritis  You confirm the rash has significantly improved  You only have some mild itching and only taking Benadryl if needed  You are aware that if the rash is not improving or gets any worse to contact our office and we may need to discontinue the colchicine

## 2019-12-17 ENCOUNTER — TELEPHONE (OUTPATIENT)
Dept: INTERNAL MEDICINE CLINIC | Facility: CLINIC | Age: 74
End: 2019-12-17

## 2019-12-17 NOTE — TELEPHONE ENCOUNTER
Per Patient's niece Hayley Donahue, Patient still has redness and puffiness at infection site and has only slightly reduced  [facility] nurse came to the home today and was also concerned that site was not looking much better and that the patient only has 1 more day of Keflex left  Alyssa would like to know if she needs to be re-evaluated or can another refill for abx be sent? Please have Angie Altamiranodalia Gloria states she has moved into the patient's home upon Geetha's discharge from the Hospital, to care for the Patient  Patient will be present to obtain verbal permission to speak with Alyssa  Per Hayley Donahue, they will have the Communication Consent updated the next time Magali Hernandez comes into the office

## 2019-12-19 ENCOUNTER — OFFICE VISIT (OUTPATIENT)
Dept: INTERNAL MEDICINE CLINIC | Facility: CLINIC | Age: 74
End: 2019-12-19

## 2019-12-19 VITALS
BODY MASS INDEX: 20.36 KG/M2 | HEART RATE: 88 BPM | SYSTOLIC BLOOD PRESSURE: 126 MMHG | TEMPERATURE: 98.3 F | DIASTOLIC BLOOD PRESSURE: 74 MMHG | WEIGHT: 97 LBS | HEIGHT: 58 IN

## 2019-12-19 DIAGNOSIS — L03.116 CELLULITIS OF LEFT LOWER EXTREMITY: Primary | ICD-10-CM

## 2019-12-19 PROCEDURE — 99213 OFFICE O/P EST LOW 20 MIN: CPT | Performed by: PHYSICIAN ASSISTANT

## 2019-12-19 RX ORDER — SULFAMETHOXAZOLE AND TRIMETHOPRIM 800; 160 MG/1; MG/1
1 TABLET ORAL EVERY 12 HOURS SCHEDULED
Qty: 14 TABLET | Refills: 0 | Status: SHIPPED | OUTPATIENT
Start: 2019-12-19 | End: 2019-12-26

## 2019-12-19 RX ORDER — CEPHALEXIN 500 MG/1
500 CAPSULE ORAL EVERY 6 HOURS SCHEDULED
Qty: 20 CAPSULE | Refills: 0 | Status: SHIPPED | OUTPATIENT
Start: 2019-12-19 | End: 2019-12-24

## 2019-12-19 NOTE — PROGRESS NOTES
Assessment/Plan:  As per our discussion today while the cellulitis infection of your left lower leg is improving it is still present  We will have you continue the cephalexin Q 6 hours for 5 days  Because you are no longer on the methotrexate we will add in the Bactrim 1 tablet twice a day for 7 days  You are aware as per your rheumatologist that you will not be resuming the methotrexate until after your infection has cleared  We also reviewed however that if in another 3-5 days you have not noticed significant improvement or any worsening of the rash or infection you will return to the emergency room  No problem-specific Assessment & Plan notes found for this encounter  Diagnoses and all orders for this visit:    Cellulitis of left lower extremity  -     cephalexin (KEFLEX) 500 mg capsule; Take 1 capsule (500 mg total) by mouth every 6 (six) hours for 5 days  -     sulfamethoxazole-trimethoprim (BACTRIM DS) 800-160 mg per tablet; Take 1 tablet by mouth every 12 (twelve) hours for 7 days          Subjective:      Patient ID: Jaun Santos is a 75 y o  female  Patient here for follow-up at request of [facility]  As noted patient had recently been admitted for cellulitis of left lower extremity  At her hospital follow-up visit it was determined that she was not taking the full course of the antibiotics as prescribed and therefore a new prescription was provided to complete the Keflex 3 times a day for the full 5 days  We received a call from [id number] who reported patient was completing her antibiotic for the 5th day that day but still had redness and swelling of her left lower extremity  Advised we could not just provide more antibiotics and patient would have to be seen therefore scheduled today  Patient does feel that there has been improvement but she also admits that it has not completely resolved  Patient denies any new symptoms no fevers no chills no weakness      Reviewed with patient on examination that she still has left lower extremity erythema and 2+ pitting edema but on a positive note there has been fairly significant clearing superior and lateral to the original cellulitis  It is also noted she has significantly less swelling and no more erythema in her left foot  Also as noted patient was contacted by her rheumatologist to discontinue taking her methotrexate until the infection has cleared  Patient reports she had already taken her weekly does this Monday but aware she will not be resuming the methotrexate until after the cellulitis has cleared  The other rash that she was evaluated for felt secondary to drug reaction to the relistor verses the colchicine is also significantly improved and patient is not having itching on her back or upper chest wall  Patient's sister accompanied her to the visit who is also my patient and a patient of our practice  Had a discussion with both about palliative care services  Explained what the services are and what they can provide  Patient at this time is appreciative of the information but feels she has another support through family members but agreeable that if she feels she needs additional assistance she will let me know  Patient will need additional antibiotics and hopefully now that she will be discontinuing her methotrexate this will help speed up the process  Patient however is aware that if she does not note any improvement with the additional antibiotics provided today she is to return to the emergency room as we reviewed she may require additional IV antibiotics  As noted while inpatient testing was negative for MRSA          The following portions of the patient's history were reviewed and updated as appropriate: allergies, current medications, past family history, past medical history, past social history, past surgical history and problem list     Review of Systems   Constitutional: Negative    Negative for chills and fever    Respiratory: Negative  Negative for cough  Cardiovascular: Negative  Negative for chest pain  Gastrointestinal: Negative for abdominal pain, constipation and diarrhea  Musculoskeletal: Positive for arthralgias and myalgias  Skin: Positive for color change and rash  Neurological: Negative for dizziness, syncope and headaches  Psychiatric/Behavioral: Negative  Objective:      /74 (BP Location: Left arm, Patient Position: Sitting, Cuff Size: Standard)   Pulse 88   Temp 98 3 °F (36 8 °C) (Oral)   Ht 4' 10" (1 473 m)   Wt 44 kg (97 lb)   BMI 20 27 kg/m²          Physical Exam   Constitutional: She appears well-developed  Cardiovascular: Normal rate  Pulmonary/Chest: Effort normal and breath sounds normal    Abdominal: Soft  Bowel sounds are normal    Musculoskeletal: She exhibits edema  Lymphadenopathy:     She has no cervical adenopathy  Neurological: She is alert  Skin: Skin is warm  There is erythema  Left lower extremity is noted to have some clearing from the original cellulitis as seen on TCM visit  Most notable to superior lateral aspect of left lower extremity  Patient however does still have 2+ pitting edema erythema to lower portion of calf and shin  As noted there has been improvement to decrease swelling in her left foot and no longer erythema to her left foot  While patient has had improvement cellulitis has not resolved  Psychiatric: She has a normal mood and affect

## 2019-12-19 NOTE — PATIENT INSTRUCTIONS
As per our discussion today while the cellulitis infection of your left lower leg is improving it is still present  We will have you continue the cephalexin Q 6 hours for 5 days  Because you are no longer on the methotrexate we will add in the Bactrim 1 tablet twice a day for 7 days  You are aware as per your rheumatologist that you will not be resuming the methotrexate until after your infection has cleared  We also reviewed however that if in another 3-5 days you have not noticed significant improvement or any worsening of the rash or infection you will return to the emergency room

## 2019-12-20 ENCOUNTER — HOSPITAL ENCOUNTER (OUTPATIENT)
Dept: INFUSION CENTER | Facility: HOSPITAL | Age: 74
Discharge: HOME | End: 2019-12-20

## 2020-01-04 DIAGNOSIS — G89.4 CHRONIC PAIN SYNDROME: ICD-10-CM

## 2020-01-04 DIAGNOSIS — G47.09 OTHER INSOMNIA: ICD-10-CM

## 2020-01-07 RX ORDER — NAPROXEN 500 MG/1
TABLET ORAL
Qty: 60 TABLET | Refills: 0 | Status: SHIPPED | OUTPATIENT
Start: 2020-01-07 | End: 2020-02-04

## 2020-01-07 RX ORDER — NORTRIPTYLINE HYDROCHLORIDE 50 MG/1
CAPSULE ORAL
Qty: 90 CAPSULE | Refills: 0 | Status: SHIPPED | OUTPATIENT
Start: 2020-01-07 | End: 2020-04-06

## 2020-01-16 ENCOUNTER — HOSPITAL ENCOUNTER (OUTPATIENT)
Dept: INFUSION CENTER | Facility: HOSPITAL | Age: 75
Discharge: HOME | End: 2020-01-16
Payer: MEDICARE

## 2020-01-16 VITALS
TEMPERATURE: 98 F | RESPIRATION RATE: 18 BRPM | HEART RATE: 73 BPM | DIASTOLIC BLOOD PRESSURE: 81 MMHG | SYSTOLIC BLOOD PRESSURE: 146 MMHG

## 2020-01-16 LAB — CALCIUM SERPL-MCNC: 9 MG/DL (ref 8.3–10.1)

## 2020-01-16 PROCEDURE — 82310 ASSAY OF CALCIUM: CPT | Performed by: NURSE PRACTITIONER

## 2020-01-16 PROCEDURE — 96401 CHEMO ANTI-NEOPL SQ/IM: CPT

## 2020-01-16 RX ADMIN — DENOSUMAB 60 MG: 60 INJECTION SUBCUTANEOUS at 13:52

## 2020-01-16 NOTE — PROGRESS NOTES
Pt here for Prolia, last labs were 12/6/19, Calcium 7 8  Spoke to Dr Yvette Joe office, asked if we could draw a stat Calcium and wait to see if she can receive Prolia    Script to be faxed

## 2020-01-16 NOTE — PLAN OF CARE
Problem: Potential for Falls  Goal: Patient will remain free of falls  Description  INTERVENTIONS:  - Assess patient frequently for physical needs  -  Identify cognitive and physical deficits and behaviors that affect risk of falls  -  Chaplin fall precautions as indicated by assessment   - Educate patient/family on patient safety including physical limitations  - Instruct patient to call for assistance with activity based on assessment  - Modify environment to reduce risk of injury  - Consider OT/PT consult to assist with strengthening/mobility  Outcome: Progressing     Problem: Potential for Falls  Goal: Patient will remain free of falls  Description  INTERVENTIONS:  - Assess patient frequently for physical needs  -  Identify cognitive and physical deficits and behaviors that affect risk of falls    -  Chaplin fall precautions as indicated by assessment   - Educate patient/family on patient safety including physical limitations  - Instruct patient to call for assistance with activity based on assessment  - Modify environment to reduce risk of injury  - Consider OT/PT consult to assist with strengthening/mobility  Outcome: Progressing

## 2020-01-23 ENCOUNTER — HOSPITAL ENCOUNTER (OUTPATIENT)
Dept: NON INVASIVE DIAGNOSTICS | Facility: HOSPITAL | Age: 75
Discharge: HOME | End: 2020-01-23
Attending: INTERNAL MEDICINE
Payer: MEDICARE

## 2020-01-23 DIAGNOSIS — I31.3 PERICARDIAL EFFUSION: ICD-10-CM

## 2020-01-23 PROCEDURE — 93308 TTE F-UP OR LMTD: CPT | Performed by: INTERNAL MEDICINE

## 2020-01-23 PROCEDURE — 93308 TTE F-UP OR LMTD: CPT

## 2020-01-23 PROCEDURE — 93325 DOPPLER ECHO COLOR FLOW MAPG: CPT | Performed by: INTERNAL MEDICINE

## 2020-01-23 PROCEDURE — 93321 DOPPLER ECHO F-UP/LMTD STD: CPT | Performed by: INTERNAL MEDICINE

## 2020-02-04 ENCOUNTER — OFFICE VISIT (OUTPATIENT)
Dept: CARDIOLOGY CLINIC | Facility: CLINIC | Age: 75
End: 2020-02-04
Payer: MEDICARE

## 2020-02-04 ENCOUNTER — TELEPHONE (OUTPATIENT)
Dept: CARDIOLOGY CLINIC | Facility: CLINIC | Age: 75
End: 2020-02-04

## 2020-02-04 VITALS
BODY MASS INDEX: 20.15 KG/M2 | SYSTOLIC BLOOD PRESSURE: 140 MMHG | HEART RATE: 86 BPM | WEIGHT: 96 LBS | DIASTOLIC BLOOD PRESSURE: 80 MMHG | HEIGHT: 58 IN

## 2020-02-04 DIAGNOSIS — M05.79 RHEUMATOID ARTHRITIS INVOLVING MULTIPLE SITES WITH POSITIVE RHEUMATOID FACTOR (HCC): ICD-10-CM

## 2020-02-04 DIAGNOSIS — G89.4 CHRONIC PAIN SYNDROME: ICD-10-CM

## 2020-02-04 DIAGNOSIS — M32.12 OTHER SYSTEMIC LUPUS ERYTHEMATOSUS WITH PERICARDITIS (HCC): ICD-10-CM

## 2020-02-04 DIAGNOSIS — I45.10 RBBB: Primary | ICD-10-CM

## 2020-02-04 DIAGNOSIS — I31.9 MYOPERICARDITIS: ICD-10-CM

## 2020-02-04 PROCEDURE — 99214 OFFICE O/P EST MOD 30 MIN: CPT | Performed by: INTERNAL MEDICINE

## 2020-02-04 PROCEDURE — 1160F RVW MEDS BY RX/DR IN RCRD: CPT | Performed by: INTERNAL MEDICINE

## 2020-02-04 PROCEDURE — 3008F BODY MASS INDEX DOCD: CPT | Performed by: INTERNAL MEDICINE

## 2020-02-04 PROCEDURE — 1036F TOBACCO NON-USER: CPT | Performed by: INTERNAL MEDICINE

## 2020-02-04 PROCEDURE — 1111F DSCHRG MED/CURRENT MED MERGE: CPT | Performed by: INTERNAL MEDICINE

## 2020-02-04 PROCEDURE — 4040F PNEUMOC VAC/ADMIN/RCVD: CPT | Performed by: INTERNAL MEDICINE

## 2020-02-04 RX ORDER — NAPROXEN 500 MG/1
TABLET ORAL
Qty: 60 TABLET | Refills: 0 | Status: SHIPPED | OUTPATIENT
Start: 2020-02-04 | End: 2020-03-06

## 2020-02-04 RX ORDER — ASPIRIN 325 MG
325 TABLET ORAL DAILY
Qty: 30 TABLET | Refills: 0 | Status: SHIPPED | OUTPATIENT
Start: 2020-02-04 | End: 2020-04-27 | Stop reason: ALTCHOICE

## 2020-02-04 NOTE — TELEPHONE ENCOUNTER
[facility] called about interaction between aspirin and methotrexate  [id number] Vivian Barlow for patient to be on both?

## 2020-02-04 NOTE — PROGRESS NOTES
Cardiology Follow Up    [facility]  1945  [id number]  [facility] [address]  [phone]  [phone]    1  RBBB     2  Myopericarditis  aspirin 325 mg tablet   3  Rheumatoid arthritis involving multiple sites with positive rheumatoid factor (HCC)     4  Other systemic lupus erythematosus with pericarditis ([address] )         Discussion/Summary:Overall patient has significantly improved  I saw  Her in the hospital for an episode of myopericarditis  At this point time will go to once a day on aspirin  She stopped the colchicine on her own  Symptoms have resolved  Follow-up echo showed no pericardial fluid  Blood pressures been controlled  Interval History:   78-year-old female with myopericarditis, lupus, rheumatoid arthritis presents for an office follow-up  She presents to the hospital with lower extremity cellulitis was found to have chest pain in a pericardial pattern and minimally elevated troponin  There was a pericardial effusion on echo  Her symptoms resolved very quickly  She stopped colchicine on her own and has been taking aspirin since December  Currently she feels well denies any chest pain, shortness of breath, palpitations, lightheadedness, dizziness, or syncope      Problem List     Anxiety (Chronic)    Peripheral vascular disease (HCC) (Chronic)    RLS (restless legs syndrome)    S/P total hip arthroplasty    Hypothyroidism    Osteoporosis    Overview Addendum 5/29/2019  3:15 PM by CONNIE Antunez Dr managing  Off alendronate         RBBB    Ambulatory dysfunction    Closed compression fracture of L3 lumbar vertebra    Rheumatoid arthritis involving multiple sites with positive rheumatoid factor [facility])    Overview Signed 3/4/2019  2:44 PM by Beryl Shah MD     Followed by Dr Geovanny Hopper, [facility]         Chronic pain syndrome    Other insomnia    Overview Signed 5/29/2019  1:53 PM by CONNIE Schuler     chronic         Vitamin D deficiency (Chronic)    Dyspepsia    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)    Cellulitis of left lower extremity    Abnormal EKG    Urinary tract infection due to Proteus    Myopericarditis    Drug-induced constipation    Rash of back    Hospital discharge follow-up (Chronic)    Blister of fifth toe, left    Positive LISA (antinuclear antibody)        Past Medical History:   Diagnosis Date    Anxiety     Depression     Disease of thyroid gland     HYPO    Femur neck fracture (HCC)     GERD (gastroesophageal reflux disease)     Hyperthyroidism     RESOLVED: [id number]    Osteoporosis     Polio     [facility]     Right BBB/left ant fasc block     UTI (urinary tract infection)     Varicella infection     LAST ASSESSED: [id number]     Social History     Socioeconomic History    Marital status:      Spouse name: Not on file    Number of children: 1    Years of education: Not on file    Highest education level: Not on file   Occupational History    Occupation: RETIRED    Social Needs    Financial resource strain: Not on file    Food insecurity:     Worry: Not on file     Inability: Not on file   GÃ¼dpod needs:     Medical: Not on file     Non-medical: Not on file   Tobacco Use    Smoking status: Former Smoker    Smokeless tobacco: Never Used    Tobacco comment: quit 20-30 years ago; NEVER A SMOKER AS PER ALL SCRIPTS    Substance and Sexual Activity    Alcohol use: Not Currently     Frequency: Never    Drug use: Not Currently    Sexual activity: Not Currently   Lifestyle    Physical activity:     Days per week: Not on file     Minutes per session: Not on file    Stress: Not on file   Relationships    Social connections:     Talks on phone: Not on file     Gets together: Not on file     Attends [employer] service: Not on file     Active member of club or organization: Not on file     Attends meetings of clubs or organizations: Not on file     Relationship status: Not on file    Intimate partner violence:     Fear of current or ex partner: Not on file     Emotionally abused: Not on file     Physically abused: Not on file     Forced sexual activity: Not on file   Other Topics Concern    Not on file   Social History Narrative    Always uses seat belt    Caffeine use    Lutheran    Single as per all scripts       Family History   Problem Relation Age of Onset    Rheum arthritis Mother     Rheum arthritis Sister     Prostate cancer Brother      Past Surgical History:   Procedure Laterality Date    APPENDECTOMY      HIP ARTHROPLASTY Left 11/27/2017    Procedure: REMOVAL IM NAIL CONVERSION TO TOTAL HIP ARTHROPLASTY POSTERIOR APPROACH;  Surgeon: Alton Bello MD;  [facility];  Service: Orthopedics    HIP FRACTURE SURGERY      HIP SURGERY      both hips replaced;2013 left ,2014 right    JOINT REPLACEMENT Right     HIP TOTAL    OK CONV PREV HIP SURG TO TOT HIP ARTHROPLAS Left 10/4/2017    Procedure: Manish Saliva removal of left hip;  Surgeon: Alton Bello MD;  [facility];  Service: Orthopedics    REVISION TOTAL HIP ARTHROPLASTY Right     TONSILLECTOMY         Current Outpatient Medications:     acetaminophen (TYLENOL) 325 mg tablet, Take prn for mild pain, Disp: 30 tablet, Rfl: 0    Ascorbic Acid (VITAMIN C) 1000 MG tablet, Take 1,000 mg by mouth daily, Disp: , Rfl:     aspirin 325 mg tablet, Take 1 tablet (325 mg total) by mouth daily, Disp: 30 tablet, Rfl: 0    cholecalciferol (VITAMIN D3) 1,000 units tablet, Take 1,000 Units by mouth daily, Disp: , Rfl:     CLEARLAX powder, MIX 17 GRAMS (CAPFUL) WITH LIQUID AND DRINK DAILY, Disp: , Rfl: 0    docusate sodium (COLACE) 100 mg capsule, Take 1 capsule by mouth 2 (two) times a day, Disp: 10 capsule, Rfl: 0    famotidine (PEPCID) 20 mg tablet, Take 1 tablet (20 mg total) by mouth 2 (two) times a day, Disp: 90 tablet, Rfl: 1    gabapentin (NEURONTIN) 300 mg capsule, Take 1 capsule (300 mg total) by mouth 3 (three) times a day, Disp: 270 capsule, Rfl: 1    levothyroxine 25 mcg tablet, Take 1 tablet (25 mcg total) by mouth daily, Disp: 90 tablet, Rfl: 1    Multiple Vitamins-Minerals (CENTRUM SILVER PO), Take 1 tablet by mouth daily, Disp: , Rfl:     naproxen (NAPROSYN) 500 mg tablet, TAKE ONE TABLET BY MOUTH TWICE A DAY WITH MEALS, Disp: 60 tablet, Rfl: 0    nortriptyline (PAMELOR) 50 mg capsule, TAKE ONE CAPSULE BY MOUTH AT BEDTIME, Disp: 90 capsule, Rfl: 0    polyethylene glycol (MIRALAX) 17 g packet, Take 17 g by mouth daily, Disp: 14 each, Rfl: 0    predniSONE 1 mg tablet, , Disp: , Rfl: 0    Psyllium (METAMUCIL FIBER PO), Take 1 packet by mouth 3 (three) times a day  , Disp: , Rfl:     Vitamin E 400 units TABS, Take 400 Units by mouth daily  , Disp: , Rfl:     DIPHENHIST 25 MG capsule, TAKE ONE CAPSULE BY MOUTH EVERY 6 HOURS AS NEEDED FOR ITCHING (SKIN RASH), Disp: , Rfl: 0    diphenhydrAMINE (BENADRYL) 25 mg tablet, Take 1 tablet (25 mg total) by mouth every 6 (six) hours as needed for itching (Skin rash ) (Patient not taking: Reported on 2/4/2020), Disp: 30 tablet, Rfl: 0    folic acid (FOLVITE) 1 mg tablet, TAKE ONE TABLET BY MOUTH EVERY DAY (Patient not taking: Reported on 2/4/2020), Disp: 30 tablet, Rfl: 2    methotrexate 2 5 MG tablet, Take by mouth 4 tablets by mouth once a week on monday, Disp: , Rfl:   No Known Allergies    Labs:     Chemistry        Component Value Date/Time     (L) 10/30/2014 0626    K 3 7 12/06/2019 0553    K 4 1 10/30/2014 0626     (H) 12/06/2019 0553     10/30/2014 0626    CO2 24 12/06/2019 0553    CO2 30 10/30/2014 0626    BUN 8 12/06/2019 0553    BUN 16 10/30/2014 0626    CREATININE 0 52 (L) 12/06/2019 0553    CREATININE 0 55 (L) 10/30/2014 0626        Component Value Date/Time    CALCIUM 9 0 01/16/2020 1322    CALCIUM 8 7 10/30/2014 0626    ALKPHOS 97 12/01/2019 1401    ALKPHOS 165 (H) 10/13/2014 0608    AST 24 12/01/2019 1401    AST 32 10/13/2014 0608    ALT 23 12/01/2019 1401    ALT 17 10/13/2014 0608    BILITOT 0 38 10/13/2014 0608            No results found for: CHOL  Lab Results   Component Value Date    HDL 84 (H) 08/22/2018    HDL 98 (H) 04/25/2017     Lab Results   Component Value Date    LDLCALC 108 (H) 08/22/2018    LDLCALC 125 (H) 04/25/2017     Lab Results   Component Value Date    TRIG 110 08/22/2018    TRIG 94 04/25/2017     No results found for: CHOLHDL    Imaging: No results found  ECG:        ROS    Vitals:    02/04/20 1004   BP: 140/80   Pulse: 86     Vitals:    02/04/20 1004   Weight: 43 5 kg (96 lb)     Height: 4' 10" (147 3 cm)   Body mass index is 20 06 kg/m²  Physical Exam:  Vital signs reviewed  General:  Alert and cooperative, appears stated age, no acute distress  HEENT:  PERRLA, EOMI, no scleral icterus, no conjunctival pallor  Neck:  No lymphadenopathy, no thyromegaly, no carotid bruits, no elevated JVP  Heart:  Regular rate and rhythm, normal S1/S2, no S3/S4, no murmur, rubs or gallops  PMI nondisplaced  Lungs:  Clear to auscultation bilaterally, no wheezes rales or rhonchi  Abdomen:  Soft, non-tender, positive bowel sounds, no rebound or guarding,   no organomegaly   Extremities:  Normal range of motion    No clubbing, cyanosis or edema   Vascular:  2+ pedal pulses  Skin:  No rashes or lesions on exposed skin  Neurologic:  Cranial nerves II-XII grossly intact without focal deficits

## 2020-02-05 ENCOUNTER — HOSPITAL ENCOUNTER (EMERGENCY)
Facility: HOSPITAL | Age: 75
Discharge: HOME | End: 2020-02-05
Attending: EMERGENCY MEDICINE | Admitting: EMERGENCY MEDICINE
Payer: MEDICARE

## 2020-02-05 ENCOUNTER — OFFICE VISIT (OUTPATIENT)
Dept: INTERNAL MEDICINE CLINIC | Facility: CLINIC | Age: 75
End: 2020-02-05

## 2020-02-05 VITALS
BODY MASS INDEX: 20.36 KG/M2 | SYSTOLIC BLOOD PRESSURE: 140 MMHG | HEART RATE: 78 BPM | TEMPERATURE: 98 F | DIASTOLIC BLOOD PRESSURE: 100 MMHG | HEIGHT: 58 IN | WEIGHT: 97 LBS | OXYGEN SATURATION: 99 %

## 2020-02-05 VITALS
DIASTOLIC BLOOD PRESSURE: 82 MMHG | SYSTOLIC BLOOD PRESSURE: 167 MMHG | OXYGEN SATURATION: 100 % | TEMPERATURE: 98 F | RESPIRATION RATE: 18 BRPM | HEIGHT: 58 IN | WEIGHT: 97 LBS | HEART RATE: 74 BPM | BODY MASS INDEX: 20.36 KG/M2

## 2020-02-05 DIAGNOSIS — R21 RASH AND NONSPECIFIC SKIN ERUPTION: Primary | ICD-10-CM

## 2020-02-05 DIAGNOSIS — M32.12 SYSTEMIC LUPUS ERYTHEMATOSUS (SLE) WITH PERICARDITIS, UNSPECIFIED SLE TYPE (HCC): ICD-10-CM

## 2020-02-05 DIAGNOSIS — M05.79 RHEUMATOID ARTHRITIS INVOLVING MULTIPLE SITES WITH POSITIVE RHEUMATOID FACTOR (HCC): ICD-10-CM

## 2020-02-05 DIAGNOSIS — M32.9 SLE (SYSTEMIC LUPUS ERYTHEMATOSUS RELATED SYNDROME) (HCC): ICD-10-CM

## 2020-02-05 DIAGNOSIS — R21 MALAR RASH: Primary | ICD-10-CM

## 2020-02-05 PROBLEM — R76.8 POSITIVE ANA (ANTINUCLEAR ANTIBODY): Status: RESOLVED | Noted: 2019-12-16 | Resolved: 2020-02-05

## 2020-02-05 PROBLEM — L03.116 CELLULITIS OF LEFT LOWER EXTREMITY: Status: RESOLVED | Noted: 2019-11-30 | Resolved: 2020-02-05

## 2020-02-05 PROBLEM — Z09 HOSPITAL DISCHARGE FOLLOW-UP: Chronic | Status: RESOLVED | Noted: 2019-12-12 | Resolved: 2020-02-05

## 2020-02-05 LAB
ANION GAP SERPL CALCULATED.3IONS-SCNC: 3 MMOL/L (ref 4–13)
BASOPHILS # BLD AUTO: 0.01 THOUSANDS/ΜL (ref 0–0.1)
BASOPHILS NFR BLD AUTO: 0 % (ref 0–1)
BUN SERPL-MCNC: 8 MG/DL (ref 5–25)
CALCIUM SERPL-MCNC: 8.4 MG/DL (ref 8.3–10.1)
CHLORIDE SERPL-SCNC: 107 MMOL/L (ref 100–108)
CO2 SERPL-SCNC: 27 MMOL/L (ref 21–32)
CREAT SERPL-MCNC: 0.48 MG/DL (ref 0.6–1.3)
EOSINOPHIL # BLD AUTO: 0.01 THOUSAND/ΜL (ref 0–0.61)
EOSINOPHIL NFR BLD AUTO: 0 % (ref 0–6)
ERYTHROCYTE [DISTWIDTH] IN BLOOD BY AUTOMATED COUNT: 14 % (ref 11.6–15.1)
GFR SERPL CREATININE-BSD FRML MDRD: 96 ML/MIN/1.73SQ M
GLUCOSE SERPL-MCNC: 87 MG/DL (ref 65–140)
HCT VFR BLD AUTO: 36.2 % (ref 34.8–46.1)
HGB BLD-MCNC: 11.1 G/DL (ref 11.5–15.4)
IMM GRANULOCYTES # BLD AUTO: 0.04 THOUSAND/UL (ref 0–0.2)
IMM GRANULOCYTES NFR BLD AUTO: 1 % (ref 0–2)
LYMPHOCYTES # BLD AUTO: 1.17 THOUSANDS/ΜL (ref 0.6–4.47)
LYMPHOCYTES NFR BLD AUTO: 16 % (ref 14–44)
MCH RBC QN AUTO: 28.2 PG (ref 26.8–34.3)
MCHC RBC AUTO-ENTMCNC: 30.7 G/DL (ref 31.4–37.4)
MCV RBC AUTO: 92 FL (ref 82–98)
MONOCYTES # BLD AUTO: 0.65 THOUSAND/ΜL (ref 0.17–1.22)
MONOCYTES NFR BLD AUTO: 9 % (ref 4–12)
NEUTROPHILS # BLD AUTO: 5.45 THOUSANDS/ΜL (ref 1.85–7.62)
NEUTS SEG NFR BLD AUTO: 74 % (ref 43–75)
NRBC BLD AUTO-RTO: 0 /100 WBCS
PLATELET # BLD AUTO: 280 THOUSANDS/UL (ref 149–390)
PMV BLD AUTO: 8.5 FL (ref 8.9–12.7)
POTASSIUM SERPL-SCNC: 3.6 MMOL/L (ref 3.5–5.3)
RBC # BLD AUTO: 3.93 MILLION/UL (ref 3.81–5.12)
SODIUM SERPL-SCNC: 137 MMOL/L (ref 136–145)
WBC # BLD AUTO: 7.33 THOUSAND/UL (ref 4.31–10.16)

## 2020-02-05 PROCEDURE — 4040F PNEUMOC VAC/ADMIN/RCVD: CPT | Performed by: PHYSICIAN ASSISTANT

## 2020-02-05 PROCEDURE — 1111F DSCHRG MED/CURRENT MED MERGE: CPT | Performed by: PHYSICIAN ASSISTANT

## 2020-02-05 PROCEDURE — 80048 BASIC METABOLIC PNL TOTAL CA: CPT | Performed by: EMERGENCY MEDICINE

## 2020-02-05 PROCEDURE — 85025 COMPLETE CBC W/AUTO DIFF WBC: CPT | Performed by: EMERGENCY MEDICINE

## 2020-02-05 PROCEDURE — 99213 OFFICE O/P EST LOW 20 MIN: CPT | Performed by: PHYSICIAN ASSISTANT

## 2020-02-05 PROCEDURE — 1036F TOBACCO NON-USER: CPT | Performed by: PHYSICIAN ASSISTANT

## 2020-02-05 PROCEDURE — 96374 THER/PROPH/DIAG INJ IV PUSH: CPT

## 2020-02-05 PROCEDURE — 99284 EMERGENCY DEPT VISIT MOD MDM: CPT | Performed by: EMERGENCY MEDICINE

## 2020-02-05 PROCEDURE — 3008F BODY MASS INDEX DOCD: CPT | Performed by: PHYSICIAN ASSISTANT

## 2020-02-05 PROCEDURE — 36415 COLL VENOUS BLD VENIPUNCTURE: CPT | Performed by: EMERGENCY MEDICINE

## 2020-02-05 PROCEDURE — 99283 EMERGENCY DEPT VISIT LOW MDM: CPT

## 2020-02-05 PROCEDURE — 1160F RVW MEDS BY RX/DR IN RCRD: CPT | Performed by: PHYSICIAN ASSISTANT

## 2020-02-05 RX ORDER — KETOROLAC TROMETHAMINE 30 MG/ML
15 INJECTION, SOLUTION INTRAMUSCULAR; INTRAVENOUS ONCE
Status: COMPLETED | OUTPATIENT
Start: 2020-02-05 | End: 2020-02-05

## 2020-02-05 RX ORDER — PREDNISONE 20 MG/1
40 TABLET ORAL ONCE
Status: COMPLETED | OUTPATIENT
Start: 2020-02-05 | End: 2020-02-05

## 2020-02-05 RX ORDER — ACETAMINOPHEN 325 MG/1
650 TABLET ORAL ONCE
Status: COMPLETED | OUTPATIENT
Start: 2020-02-05 | End: 2020-02-05

## 2020-02-05 RX ADMIN — ACETAMINOPHEN 650 MG: 325 TABLET ORAL at 15:39

## 2020-02-05 RX ADMIN — KETOROLAC TROMETHAMINE 15 MG: 30 INJECTION, SOLUTION INTRAMUSCULAR at 15:39

## 2020-02-05 RX ADMIN — PREDNISONE 40 MG: 20 TABLET ORAL at 15:39

## 2020-02-05 NOTE — ED PROVIDER NOTES
History  Chief Complaint   Patient presents with    Medical Problem     Patient has redness and swelling in her face  Swelling in hands and feet starting 1/30  Denies injury  Patient is a 41-year-old female presenting for a lupus exacerbation  Previously diagnosed with lupus and is maintained on daily prednisone but states he has had 2 days of rash on her face and swelling in her both of her wrists and both of her ankles  Denies any fevers or chills  No nausea or vomiting  Denies any headaches  No meningismus or nuchal rigidity  No other recent injury illness  Has been compliant with all medications  Prior to Admission Medications   Prescriptions Last Dose Informant Patient Reported? Taking?    Ascorbic Acid (VITAMIN C) 1000 MG tablet  Self Yes No   Sig: Take 1,000 mg by mouth daily   CLEARLAX powder  Self Yes No   Sig: MIX 17 GRAMS (CAPFUL) WITH LIQUID AND DRINK DAILY   DIPHENHIST 25 MG capsule  Self Yes No   Sig: TAKE ONE CAPSULE BY MOUTH EVERY 6 HOURS AS NEEDED FOR ITCHING (SKIN RASH)   Multiple Vitamins-Minerals (CENTRUM SILVER PO)  Self Yes No   Sig: Take 1 tablet by mouth daily   Psyllium (METAMUCIL FIBER PO)  Self Yes No   Sig: Take 1 packet by mouth 3 (three) times a day     Vitamin E 400 units TABS  Self Yes No   Sig: Take 400 Units by mouth daily     acetaminophen (TYLENOL) 325 mg tablet  Self No No   Sig: Take prn for mild pain   aspirin 325 mg tablet   No No   Sig: Take 1 tablet (325 mg total) by mouth daily   cholecalciferol (VITAMIN D3) 1,000 units tablet  Self Yes No   Sig: Take 1,000 Units by mouth daily   diphenhydrAMINE (BENADRYL) 25 mg tablet  Self No No   Sig: Take 1 tablet (25 mg total) by mouth every 6 (six) hours as needed for itching (Skin rash )   Patient not taking: Reported on 2/4/2020   docusate sodium (COLACE) 100 mg capsule  Self No No   Sig: Take 1 capsule by mouth 2 (two) times a day   famotidine (PEPCID) 20 mg tablet  Self No No   Sig: Take 1 tablet (20 mg total) by mouth 2 (two) times a day   folic acid (FOLVITE) 1 mg tablet  Self No No   Sig: TAKE ONE TABLET BY MOUTH EVERY DAY   Patient not taking: Reported on 2/4/2020   gabapentin (NEURONTIN) 300 mg capsule  Self No No   Sig: Take 1 capsule (300 mg total) by mouth 3 (three) times a day   levothyroxine 25 mcg tablet  Self No No   Sig: Take 1 tablet (25 mcg total) by mouth daily   methotrexate 2 5 MG tablet  Self Yes No   Sig: Take by mouth 4 tablets by mouth once a week on monday   naproxen (NAPROSYN) 500 mg tablet   No No   Sig: TAKE ONE TABLET BY MOUTH TWICE A DAY WITH MEALS   nortriptyline (PAMELOR) 50 mg capsule  Self No No   Sig: TAKE ONE CAPSULE BY MOUTH AT BEDTIME   polyethylene glycol (MIRALAX) 17 g packet  Self No No   Sig: Take 17 g by mouth daily   predniSONE 1 mg tablet  Self Yes No      Facility-Administered Medications: None       Past Medical History:   Diagnosis Date    Anxiety     Depression     Disease of thyroid gland     HYPO    Femur neck fracture (HCC)     GERD (gastroesophageal reflux disease)     Hyperthyroidism     RESOLVED: [id number]    Osteoporosis     Polio     PVD (peripheral vascular disease) (HCC)     Right BBB/left ant fasc block     UTI (urinary tract infection)     Varicella infection     LAST ASSESSED: [id number]       Past Surgical History:   Procedure Laterality Date    APPENDECTOMY      HIP ARTHROPLASTY Left 11/27/2017    Procedure: REMOVAL IM NAIL CONVERSION TO TOTAL HIP ARTHROPLASTY POSTERIOR APPROACH;  Surgeon: Parth Mason MD;  [facility];  Service: Orthopedics    HIP FRACTURE SURGERY      HIP SURGERY      both hips replaced;2013 left ,2014 right    JOINT REPLACEMENT Right     HIP TOTAL    DE CONV PREV HIP SURG TO TOT HIP ARTHROPLAS Left 10/4/2017    Procedure: Mert Durham removal of left hip;  Surgeon: Parth Mason MD;  [facility];  Service: Orthopedics    REVISION TOTAL HIP ARTHROPLASTY Right     TONSILLECTOMY         Family History   Problem Relation Age of Onset    Rheum arthritis Mother     Rheum arthritis Sister     Prostate cancer Brother      I have reviewed and agree with the history as documented  Social History     Tobacco Use    Smoking status: Former Smoker    Smokeless tobacco: Never Used    Tobacco comment: quit 20-30 years ago; NEVER A SMOKER AS PER ALL SCRIPTS    Substance Use Topics    Alcohol use: Not Currently     Frequency: Never    Drug use: Not Currently        Review of Systems   Constitutional: Negative for appetite change, fatigue, fever and unexpected weight change  HENT: Negative for congestion, rhinorrhea and sore throat  Eyes: Negative for photophobia, pain, redness and visual disturbance  Respiratory: Negative for cough, chest tightness, shortness of breath and wheezing  Cardiovascular: Negative for chest pain, palpitations and leg swelling  Gastrointestinal: Negative for abdominal pain, constipation, diarrhea, nausea and vomiting  Genitourinary: Negative for difficulty urinating, dysuria, frequency, menstrual problem, pelvic pain, vaginal bleeding and vaginal discharge  Musculoskeletal: Positive for joint swelling  Negative for back pain and neck pain  Skin: Positive for rash  Neurological: Negative for dizziness, syncope, light-headedness and headaches  Psychiatric/Behavioral: Negative for agitation and sleep disturbance  All other systems reviewed and are negative  Physical Exam  Physical Exam   Constitutional: She is oriented to person, place, and time  She appears well-developed and well-nourished  HENT:   Head: Normocephalic and atraumatic  Mouth/Throat: Oropharynx is clear and moist    Eyes: Conjunctivae are normal  No scleral icterus  Neck: Normal range of motion  Neck supple  Cardiovascular: Normal rate, regular rhythm, normal heart sounds and intact distal pulses  Exam reveals no gallop and no friction rub  No murmur heard    Pulmonary/Chest: Effort normal and breath sounds normal  No respiratory distress  She has no wheezes  She has no rales  Abdominal: Soft  She exhibits no mass  There is no tenderness  There is no rebound and no guarding  No hernia  Musculoskeletal: Normal range of motion  She exhibits no edema  Symmetric swelling of both wrists and both ankles no cellulitis   Neurological: She is alert and oriented to person, place, and time  Skin: Skin is warm and dry  Capillary refill takes less than 2 seconds  Rash noted  No pallor  Malar butterfly rash present on face   Psychiatric: She has a normal mood and affect  Nursing note and vitals reviewed        Vital Signs  ED Triage Vitals   Temperature Pulse Respirations Blood Pressure SpO2   02/05/20 1312 02/05/20 1312 02/05/20 1312 02/05/20 1312 02/05/20 1312   98 °F (36 7 °C) 70 17 152/86 99 %      Temp Source Heart Rate Source Patient Position - Orthostatic VS BP Location FiO2 (%)   02/05/20 1312 02/05/20 1312 02/05/20 1312 02/05/20 1312 --   Oral Monitor Sitting Left arm       Pain Score       02/05/20 1313       7           Vitals:    02/05/20 1312 02/05/20 1507 02/05/20 1656   BP: 152/86 167/82    Pulse: 70 72 74   Patient Position - Orthostatic VS: Sitting Lying          Visual Acuity      ED Medications  Medications   acetaminophen (TYLENOL) tablet 650 mg (650 mg Oral Given 2/5/20 1539)   ketorolac (TORADOL) injection 15 mg (15 mg Intravenous Given 2/5/20 1539)   predniSONE tablet 40 mg (40 mg Oral Given 2/5/20 1539)       Diagnostic Studies  Results Reviewed     Procedure Component Value Units Date/Time    Basic metabolic panel [[id number]]  (Abnormal) Collected:  02/05/20 1534    Lab Status:  Final result Specimen:  Blood from Arm, Left Updated:  02/05/20 1601     Sodium 137 mmol/L      Potassium 3 6 mmol/L      Chloride 107 mmol/L      CO2 27 mmol/L      ANION GAP 3 mmol/L      BUN 8 mg/dL      Creatinine 0 48 mg/dL      Glucose 87 mg/dL      Calcium 8 4 mg/dL      eGFR 96 ml/min/1 73sq m     Narrative:       National Kidney Disease Foundation guidelines for Chronic Kidney Disease (CKD):     Stage 1 with normal or high GFR (GFR > 90 mL/min/1 73 square meters)    Stage 2 Mild CKD (GFR = 60-89 mL/min/1 73 square meters)    Stage 3A Moderate CKD (GFR = 45-59 mL/min/1 73 square meters)    Stage 3B Moderate CKD (GFR = 30-44 mL/min/1 73 square meters)    Stage 4 Severe CKD (GFR = 15-29 mL/min/1 73 square meters)    Stage 5 End Stage CKD (GFR <15 mL/min/1 73 square meters)  Note: GFR calculation is accurate only with a steady state creatinine    CBC and differential [[id number]]  (Abnormal) Collected:  02/05/20 1534    Lab Status:  Final result Specimen:  Blood from Arm, Left Updated:  02/05/20 1544     WBC 7 33 Thousand/uL      RBC 3 93 Million/uL      Hemoglobin 11 1 g/dL      Hematocrit 36 2 %      MCV 92 fL      MCH 28 2 pg      MCHC 30 7 g/dL      RDW 14 0 %      MPV 8 5 fL      Platelets 065 Thousands/uL      nRBC 0 /100 WBCs      Neutrophils Relative 74 %      Immat GRANS % 1 %      Lymphocytes Relative 16 %      Monocytes Relative 9 %      Eosinophils Relative 0 %      Basophils Relative 0 %      Neutrophils Absolute 5 45 Thousands/µL      Immature Grans Absolute 0 04 Thousand/uL      Lymphocytes Absolute 1 17 Thousands/µL      Monocytes Absolute 0 65 Thousand/µL      Eosinophils Absolute 0 01 Thousand/µL      Basophils Absolute 0 01 Thousands/µL                  No orders to display              Procedures  Procedures         ED Course           Identification of Seniors at Risk      Most Recent Value   (ISAR) Identification of Seniors at Risk   Before the illness or injury that brought you to the Emergency, did you need someone to help you on a regular basis? 1 Filed at: 02/05/2020 1313   In the last 24 hours, have you needed more help than usual?  0 Filed at: 02/05/2020 1313   Have you been hospitalized for one or more nights during the past 6 months?   1 Filed at: 02/05/2020 24 [id number]   In general, do you see well?  0 Filed at: 02/05/2020 1313   In general, do you have serious problems with your memory? 0 Filed at: 02/05/2020 1313   Do you take more than three different medications every day? 1 Filed at: 02/05/2020 1313   ISAR Score  3 Filed at: 02/05/2020 1313                          MDM  Number of Diagnoses or Management Options     Amount and/or Complexity of Data Reviewed  Clinical lab tests: reviewed and ordered  Decide to obtain previous medical records or to obtain history from someone other than the patient: yes  Review and summarize past medical records: yes  Discuss the patient with other providers: yes      Attempted to contact patient's Rheumatologist unsuccessful  Did send information via [employer]  Pt and daughter comfortable being discharged at this time  Disposition  Final diagnoses:   Malar rash   SLE (systemic lupus erythematosus related syndrome) ([facility] [address] )     Time reflects when diagnosis was documented in both MDM as applicable and the Disposition within this note     Time User Action Codes Description Comment    2/5/2020  5:16 PM Kirstie Ch T Add [R21] Malar rash     2/5/2020  5:16 PM Asha Felipe T Add [M32 9] SLE (systemic lupus erythematosus related syndrome) [facility])       ED Disposition     ED Disposition Condition Date/Time Comment    Discharge Stable Wed Feb 5, 2020  5:16 PM Geetha Rivera discharge to home/self care  Follow-up Information     Follow up With Specialties Details Why Contact Info    Liliana Kinney PA-C Internal Medicine, Physician Assistant Schedule an appointment as soon as possible for a visit   [phone] E   [address] [facility]      Chanelle Conti MD Rheumatology, Multidisciplinary Schedule an appointment as soon as possible for a visit   [address] [facility] [address] [facility]  [phone]            Current Discharge Medication List      CONTINUE these medications which have NOT CHANGED    Details   acetaminophen (TYLENOL) 325 mg tablet Take prn for mild pain  Qty: 30 tablet, Refills: 0    Associated Diagnoses: Arthritis of left hip      Ascorbic Acid (VITAMIN C) 1000 MG tablet Take 1,000 mg by mouth daily      aspirin 325 mg tablet Take 1 tablet (325 mg total) by mouth daily  Qty: 30 tablet, Refills: 0    Associated Diagnoses: Myopericarditis      cholecalciferol (VITAMIN D3) 1,000 units tablet Take 1,000 Units by mouth daily      CLEARLAX powder MIX 17 GRAMS (CAPFUL) WITH LIQUID AND DRINK DAILY  Refills: 0      DIPHENHIST 25 MG capsule TAKE ONE CAPSULE BY MOUTH EVERY 6 HOURS AS NEEDED FOR ITCHING (SKIN RASH)  Refills: 0      diphenhydrAMINE (BENADRYL) 25 mg tablet Take 1 tablet (25 mg total) by mouth every 6 (six) hours as needed for itching (Skin rash )  Qty: 30 tablet, Refills: 0    Associated Diagnoses: Rash of back      docusate sodium (COLACE) 100 mg capsule Take 1 capsule by mouth 2 (two) times a day  Qty: 10 capsule, Refills: 0      famotidine (PEPCID) 20 mg tablet Take 1 tablet (20 mg total) by mouth 2 (two) times a day  Qty: 90 tablet, Refills: 1    Associated Diagnoses: Myopericarditis      folic acid (FOLVITE) 1 mg tablet TAKE ONE TABLET BY MOUTH EVERY DAY  Qty: 30 tablet, Refills: 2    Associated Diagnoses: RLS (restless legs syndrome)      gabapentin (NEURONTIN) 300 mg capsule Take 1 capsule (300 mg total) by mouth 3 (three) times a day  Qty: 270 capsule, Refills: 1    Comments: Needs 180 for frequency chg  Associated Diagnoses: Chronic pain syndrome      levothyroxine 25 mcg tablet Take 1 tablet (25 mcg total) by mouth daily  Qty: 90 tablet, Refills: 1    Associated Diagnoses: Hypothyroidism, unspecified type      methotrexate 2 5 MG tablet Take by mouth 4 tablets by mouth once a week on monday      Multiple Vitamins-Minerals (CENTRUM SILVER PO) Take 1 tablet by mouth daily      naproxen (NAPROSYN) 500 mg tablet TAKE ONE TABLET BY MOUTH TWICE A DAY WITH MEALS  Qty: 60 tablet, Refills: 0    Associated Diagnoses: Chronic pain syndrome      nortriptyline (PAMELOR) 50 mg capsule TAKE ONE CAPSULE BY MOUTH AT BEDTIME  Qty: 90 capsule, Refills: 0    Associated Diagnoses: Other insomnia      polyethylene glycol (MIRALAX) 17 g packet Take 17 g by mouth daily  Qty: 14 each, Refills: 0    Associated Diagnoses: Drug-induced constipation      predniSONE 1 mg tablet Refills: 0      Psyllium (METAMUCIL FIBER PO) Take 1 packet by mouth 3 (three) times a day        Vitamin E 400 units TABS Take 400 Units by mouth daily             No discharge procedures on file      ED Provider  Electronically Signed by           Carrie Lebron DO  02/05/20 1398

## 2020-02-05 NOTE — PATIENT INSTRUCTIONS
As per our discussion today you are aware that this may be simply a exacerbation of your underlying lupus that was recently diagnosed in December secondary to the fact that you have been off of her methotrexate since that time that you were on previously for your rheumatoid arthritis  That being said on evaluation the rash is limited to the left side of her face with increased temperature and swelling  As per discussion with attending as well as herself and family you are agreeable to evaluation in the emergency room  Also reviewed importance of contacting your rheumatologist to schedule a follow-up as soon as possible

## 2020-02-05 NOTE — PROGRESS NOTES
Assessment/Plan:  As per our discussion today you are aware that this may be simply a exacerbation of your underlying lupus that was recently diagnosed in December secondary to the fact that you have been off of her methotrexate since that time that you were on previously for your rheumatoid arthritis  That being said on evaluation the rash is limited to the left side of her face with increased temperature and swelling  As per discussion with attending as well as herself and family you are agreeable to evaluation in the emergency room  Also reviewed importance of contacting your rheumatologist to schedule a follow-up as soon as possible  No problem-specific Assessment & Plan notes found for this encounter  Diagnoses and all orders for this visit:    Rash and nonspecific skin eruption    Rheumatoid arthritis involving multiple sites with positive rheumatoid factor (HCC)    Systemic lupus erythematosus (SLE) with pericarditis, unspecified SLE type ([facility] [address] )          Subjective:      Patient ID: Sujatha Whalen is a 75 y o  female  Pt here for [facility] with c/o return of redness and swelling to R foot and now to her face as well that restarted 1/31/2020 started by R ear  As noted her Rheumatologist has not restarted her methotrexate as was held while under treatment for cellulitis  infection as was delaying the healing process  States no pain not feel hot but slight burning sensation to face but not pain to leg at all just swollen  Was IP last time and required additional antibiotics once off the methotrexate  Saw cardio yesterday and told OK except dec ASA to 81 mg only  Discussed with patient and family members with her today that this may simply be an exacerbation of her rash secondary to lupus however it is located on 1 side of her face only on the left side although there is some minimal redness to the superior right eyelid      However as patient was recently treated for fairly extensive cellulitis on multiple courses of antibiotics there is some concern for infection and as this is located next to orbits suggest patient have further evaluation in the emergency room  Discuss this with attending as well and agreed while this may simply be exacerbation of underlying autoimmune especially as she has not seen the rheumatologist since the new onset diagnosis of lupus in December 2019 patient would be better evaluated in the emergency room setting  The following portions of the patient's history were reviewed and updated as appropriate: allergies, current medications, past family history, past medical history, past social history, past surgical history and problem list     Review of Systems   Constitutional: Negative  Negative for chills, fever and unexpected weight change  HENT: Negative for drooling, hearing loss, tinnitus and trouble swallowing  Respiratory: Negative  Negative for cough and shortness of breath  Cardiovascular: Negative  Negative for chest pain  Gastrointestinal: Negative for abdominal pain  Endocrine: Positive for cold intolerance  Negative for polydipsia, polyphagia and polyuria  Musculoskeletal: Positive for arthralgias, joint swelling and myalgias  Skin: Positive for color change and rash  Neurological: Positive for light-headedness  Negative for dizziness, tremors, syncope and headaches  Psychiatric/Behavioral: Negative  Objective:      /100 (BP Location: Left arm, Patient Position: Sitting, Cuff Size: Child)   Pulse 78   Temp 98 °F (36 7 °C) (Oral)   Ht 4' 10" (1 473 m)   Wt 44 kg (97 lb)   SpO2 99%   BMI 20 27 kg/m²          Physical Exam   Constitutional:   Patient in no acute distress walks with assistance of a rolling walker secondary to arthritic changes from rheumatoid arthritis  HENT:   Head: Normocephalic  Eyes: Conjunctivae are normal    Neck: Neck supple  No JVD present     Cardiovascular: Normal rate and regular rhythm  Pulmonary/Chest: Effort normal and breath sounds normal  She has no wheezes  Musculoskeletal: She exhibits edema, tenderness and deformity  Skin: Skin is warm  Rash noted  There is erythema  Patient's left lower extremity has diffuse hyper pigmentation blanching with palpation  Not present on right lower extremity  Left lower extremity slightly more warm than right lower extremity  On patient's face she has a diffuse erythematous rash encompassing the entire left side of her face with notable swelling left maxillary region  Patient has small erythematous rash macular superior right eyebrow  Skin on left side of face is noticeably more warm to palpation than right  No evidence of orbital involvement at this time  Psychiatric: She has a normal mood and affect   Her behavior is normal

## 2020-02-06 ENCOUNTER — TELEPHONE (OUTPATIENT)
Dept: INTERNAL MEDICINE CLINIC | Facility: CLINIC | Age: 75
End: 2020-02-06

## 2020-02-06 NOTE — TELEPHONE ENCOUNTER
Patient was seen yesterday 2/5/20 for a rash  She was told to go to the ER for this rash  She did and they did prescribe her some medications  Acetaminophen  Ketorolac  Prednisone     Now the rash is back and it is starting to itch  Daughter called to see if we can call something else in for the itch   She is scheduled for a ER,Rash follow up on 2/12/20

## 2020-02-10 ENCOUNTER — TELEPHONE (OUTPATIENT)
Dept: INTERNAL MEDICINE CLINIC | Facility: CLINIC | Age: 75
End: 2020-02-10

## 2020-02-10 NOTE — TELEPHONE ENCOUNTER
Called patient and has been informed  No questions at this moment   Patient will follow up with the rheumatologist

## 2020-02-25 ENCOUNTER — CONSULT (OUTPATIENT)
Dept: MULTI SPECIALTY CLINIC | Facility: CLINIC | Age: 75
End: 2020-02-25

## 2020-02-25 VITALS
HEART RATE: 85 BPM | SYSTOLIC BLOOD PRESSURE: 130 MMHG | WEIGHT: 96.56 LBS | BODY MASS INDEX: 20.27 KG/M2 | TEMPERATURE: 98.9 F | DIASTOLIC BLOOD PRESSURE: 80 MMHG | OXYGEN SATURATION: 98 % | HEIGHT: 58 IN

## 2020-02-25 DIAGNOSIS — L84 CALLUS: ICD-10-CM

## 2020-02-25 DIAGNOSIS — M21.172 ACQUIRED VARUS DEFORMITY OF LEFT FOOT: Primary | ICD-10-CM

## 2020-02-25 DIAGNOSIS — M77.41 METATARSALGIA OF BOTH FEET: ICD-10-CM

## 2020-02-25 DIAGNOSIS — M77.42 METATARSALGIA OF BOTH FEET: ICD-10-CM

## 2020-02-25 PROCEDURE — 99203 OFFICE O/P NEW LOW 30 MIN: CPT | Performed by: PODIATRIST

## 2020-02-25 PROCEDURE — 11055 PARING/CUTG B9 HYPRKER LES 1: CPT | Performed by: PODIATRIST

## 2020-02-25 PROCEDURE — 1036F TOBACCO NON-USER: CPT | Performed by: PODIATRIST

## 2020-02-25 PROCEDURE — 4040F PNEUMOC VAC/ADMIN/RCVD: CPT | Performed by: PODIATRIST

## 2020-02-25 PROCEDURE — 3008F BODY MASS INDEX DOCD: CPT | Performed by: PODIATRIST

## 2020-02-25 PROCEDURE — 1160F RVW MEDS BY RX/DR IN RCRD: CPT | Performed by: PODIATRIST

## 2020-02-25 NOTE — PROGRESS NOTES
[address] 75 y o  female MRN: [id number]  Encounter: [id number]    Assessment/Plan        Diagnoses and all orders for this visit:    Acquired varus deformity of left foot  -     Ambulatory referral to Physical Therapy; Future    Callus  -     Ambulatory referral to Podiatry    Metatarsalgia of both feet  -     Ambulatory referral to Physical Therapy; Future           Plan:   Excisionally debrided sub met 5 callus on the left x1 using sterile 15 blade to normal appearing epithelium without incident  No underlying ulceration noted at this time  (CPT [id number])   Provided patient with prescription for a custom orthotic inserts to be worn with shoe gear while ambulating  Recommend ambulation with assistance from walker  Patient was not interested in [facility] brace at this time   Continue use of supportive shoe gear and offloading pads at the left 5th metatarsal callus site   Check plantar feet daily to assess callus and possible onset of ulcerations   RTC as needed    - Dr Slime Boone was present for entirety of patient encounter  History of Present Illness     HPI:  Tushar Jeffrey is a 75 y o  female who presents with bilateral lower extremity deformity as result of polio  Patient was recently hospitalized with left lower extremity cellulitis due to left sub met 5th ulceration that resulted from musculoskeletal deformity  Patient has been able to prevent read ulceration with use of offloading U pads at the left 5th metatarsal site  Patient has found this method to be beneficial, although, this was her only method of offloading prior to hospitalization  Patient states that she moisturizer is an check feet daily  Patient has made it clear that she does not want extensive bracing  Patient denies N/V/F/chills/SOB/CP  Review of Systems   Constitutional: Negative  HENT: Negative  Eyes: Negative  Respiratory: Negative  Cardiovascular: Negative  Gastrointestinal: Negative      Musculoskeletal:  Varus deformity bilateral feet, worse on left  Skin:  Left sub met 5 callus  Neurological:  Negative        Historical Information   Past Medical History:   Diagnosis Date    Anxiety     Depression     Disease of thyroid gland     HYPO    Femur neck fracture (HCC)     GERD (gastroesophageal reflux disease)     Hyperthyroidism     RESOLVED: [id number]    Osteoporosis     Polio     PVD (peripheral vascular disease) (HCC)     Right BBB/left ant fasc block     UTI (urinary tract infection)     Varicella infection     LAST ASSESSED: [id number]     Past Surgical History:   Procedure Laterality Date    APPENDECTOMY      HIP ARTHROPLASTY Left 11/27/2017    Procedure: REMOVAL IM NAIL CONVERSION TO TOTAL HIP ARTHROPLASTY POSTERIOR APPROACH;  Surgeon: Duane Henri, MD;  Location: [facility];  Service: Orthopedics    HIP FRACTURE SURGERY      HIP SURGERY      both hips replaced;2013 left ,2014 right    JOINT REPLACEMENT Right     HIP TOTAL    HI CONV PREV HIP SURG TO TOT HIP ARTHROPLAS Left 10/4/2017    Procedure: Hareware removal of left hip;  Surgeon: Duane Henri, MD;  Location: [facility];  Service: Orthopedics    REVISION TOTAL HIP ARTHROPLASTY Right     TONSILLECTOMY       Social History   Social History     Substance and Sexual Activity   Alcohol Use Not Currently    Frequency: Never     Social History     Substance and Sexual Activity   Drug Use Not Currently     Social History     Tobacco Use   Smoking Status Former Smoker   Smokeless Tobacco Never Used   Tobacco Comment    quit 20-30 years ago; NEVER A SMOKER AS PER ALL SCRIPTS      Family History:   Family History   Problem Relation Age of Onset    Rheum arthritis Mother     Rheum arthritis Sister     Prostate cancer Brother        Meds/Allergies     (Not in a hospital admission)  No Known Allergies    Objective     Current Vitals:   Blood Pressure: 130/80 (02/25/20 1004)  Pulse: 85 (02/25/20 1004)  Temperature: 98 9 °F (37 2 °C) (02/25/20 1004)  Temp Source: Temporal (02/25/20 1004)  Height: 4' 10" (147 3 cm) (02/25/20 1004)  Weight - Scale: 43 8 kg (96 lb 9 oz) (02/25/20 1004)  SpO2: 98 % (02/25/20 1004)        /80 (BP Location: Right arm, Patient Position: Sitting, Cuff Size: Adult)   Pulse 85   Temp 98 9 °F (37 2 °C) (Temporal)   Ht 4' 10" (1 473 m)   Wt 43 8 kg (96 lb 9 oz)   SpO2 98%   BMI 20 18 kg/m²       Lower Extremity Exam:    Physical Exam:    Musculoskeletal:  MMT is 4/5 to all compartments B/L, +0/4 edema B/L, Hallux ROM is < 65 degrees B/L, Ankle dorsiflexion < 10 degrees from neutral with leg extended  and bentB/L  Bilateral feet in equino adductovarus position, left worse than right  [place] of deformity noted to be at the ankle  Biomechanical Exam of LE:  Hip ROM WNL Bilateral, external and internal rotation about equal at 45° b/l  Knee ROM WNL Bilateral, no hyperextension noted b/l, no genu varum/valgum noted b/l  Ankle dorsiflexion with knee extended is unable to get pass vertical on right and unable to get pass vertical on left  Ankle dorsiflexion with knee flexed is unable to get pass vertical on right and unable to get pass vertical on left  Malleolar positioning is WNL b/l  STJ ROM WNL b/l, heel inversion is approximately 10° on right and 10° on left; heel eversion is approximately 10° on right and 10° on left; neutral calcaneal stance position is varus   1st ray ROM WNL b/l, unable to dorsiflex/plantarflex b/l  Tibial varum is 0° b/l, Resting calcaneal stance position is varus and approximately 5° on right and varus and approximately 5° on left  Gait analysis: restricted ambulation  Gross deformity noted: Equino adductovarus       Cardiovascular:  Pedal pulses palpable, CFT< 3sec to digits  Pedal hair is Absent  Skin temperature warm to warm B/L       Dermatological:  Hyperkeratotic lesion located on the plantar aspect of the left 5th metatarsal head with underlying punctate subdermal hematoma noted  There is mild tenderness on palpation  Neurological:  Gross sensation is intact  Protective sensation is diminished  Imaging: I have personally reviewed pertinent films in PACS  EKG, Pathology, and Other Studies: I have personally reviewed pertinent reports  Today, Patient Was Seen By: Jose Case DPM    ** Please Note: Portions of the record may have been created with voice recognition software  Occasional wrong word or "sound a like" substitutions may have occurred due to the inherent limitations of voice recognition software  Read the chart carefully and recognize, using context, where substitutions have occurred   **

## 2020-03-06 DIAGNOSIS — G89.4 CHRONIC PAIN SYNDROME: ICD-10-CM

## 2020-03-06 RX ORDER — NAPROXEN 500 MG/1
TABLET ORAL
Qty: 60 TABLET | Refills: 0 | Status: SHIPPED | OUTPATIENT
Start: 2020-03-06 | End: 2020-04-13

## 2020-04-05 DIAGNOSIS — G47.09 OTHER INSOMNIA: ICD-10-CM

## 2020-04-06 RX ORDER — NORTRIPTYLINE HYDROCHLORIDE 50 MG/1
CAPSULE ORAL
Qty: 90 CAPSULE | Refills: 0 | Status: SHIPPED | OUTPATIENT
Start: 2020-04-06 | End: 2020-07-23

## 2020-04-12 DIAGNOSIS — G89.4 CHRONIC PAIN SYNDROME: ICD-10-CM

## 2020-04-13 RX ORDER — NAPROXEN 500 MG/1
TABLET ORAL
Qty: 60 TABLET | Refills: 0 | Status: SHIPPED | OUTPATIENT
Start: 2020-04-13 | End: 2020-05-13

## 2020-04-27 ENCOUNTER — TELEMEDICINE (OUTPATIENT)
Dept: INTERNAL MEDICINE CLINIC | Facility: CLINIC | Age: 75
End: 2020-04-27

## 2020-04-27 DIAGNOSIS — E06.3 HYPOTHYROIDISM DUE TO HASHIMOTO'S THYROIDITIS: ICD-10-CM

## 2020-04-27 DIAGNOSIS — M32.12 OTHER SYSTEMIC LUPUS ERYTHEMATOSUS WITH PERICARDITIS (HCC): ICD-10-CM

## 2020-04-27 DIAGNOSIS — E03.8 HYPOTHYROIDISM DUE TO HASHIMOTO'S THYROIDITIS: ICD-10-CM

## 2020-04-27 DIAGNOSIS — M05.79 RHEUMATOID ARTHRITIS INVOLVING MULTIPLE SITES WITH POSITIVE RHEUMATOID FACTOR (HCC): Primary | ICD-10-CM

## 2020-04-27 DIAGNOSIS — E03.9 HYPOTHYROIDISM, UNSPECIFIED TYPE: ICD-10-CM

## 2020-04-27 PROBLEM — N39.0 URINARY TRACT INFECTION DUE TO PROTEUS: Status: RESOLVED | Noted: 2019-11-30 | Resolved: 2020-04-27

## 2020-04-27 PROBLEM — B96.4 URINARY TRACT INFECTION DUE TO PROTEUS: Status: RESOLVED | Noted: 2019-11-30 | Resolved: 2020-04-27

## 2020-04-27 PROCEDURE — 99213 OFFICE O/P EST LOW 20 MIN: CPT | Performed by: PHYSICIAN ASSISTANT

## 2020-04-27 RX ORDER — LEVOTHYROXINE SODIUM 0.03 MG/1
25 TABLET ORAL DAILY
Qty: 90 TABLET | Refills: 1 | Status: SHIPPED | OUTPATIENT
Start: 2020-04-27 | End: 2020-12-07

## 2020-04-27 RX ORDER — HYDROXYCHLOROQUINE SULFATE 200 MG/1
TABLET, FILM COATED ORAL
COMMUNITY
Start: 2020-04-04

## 2020-04-30 ENCOUNTER — TELEPHONE (OUTPATIENT)
Dept: INTERNAL MEDICINE CLINIC | Facility: CLINIC | Age: 75
End: 2020-04-30

## 2020-05-13 DIAGNOSIS — G89.4 CHRONIC PAIN SYNDROME: ICD-10-CM

## 2020-05-13 RX ORDER — NAPROXEN 500 MG/1
TABLET ORAL
Qty: 60 TABLET | Refills: 0 | Status: SHIPPED | OUTPATIENT
Start: 2020-05-13 | End: 2020-06-19

## 2020-06-03 DIAGNOSIS — G89.4 CHRONIC PAIN SYNDROME: ICD-10-CM

## 2020-06-03 RX ORDER — GABAPENTIN 300 MG/1
CAPSULE ORAL
Qty: 270 CAPSULE | Refills: 1 | Status: SHIPPED | OUTPATIENT
Start: 2020-06-03 | End: 2020-12-28

## 2020-06-19 DIAGNOSIS — G89.4 CHRONIC PAIN SYNDROME: ICD-10-CM

## 2020-06-19 RX ORDER — NAPROXEN 500 MG/1
TABLET ORAL
Qty: 60 TABLET | Refills: 0 | Status: SHIPPED | OUTPATIENT
Start: 2020-06-19 | End: 2020-07-23

## 2020-07-23 DIAGNOSIS — G89.4 CHRONIC PAIN SYNDROME: ICD-10-CM

## 2020-07-23 DIAGNOSIS — G47.09 OTHER INSOMNIA: ICD-10-CM

## 2020-07-23 RX ORDER — NORTRIPTYLINE HYDROCHLORIDE 50 MG/1
CAPSULE ORAL
Qty: 90 CAPSULE | Refills: 0 | Status: SHIPPED | OUTPATIENT
Start: 2020-07-23 | End: 2020-11-04

## 2020-07-23 RX ORDER — NAPROXEN 500 MG/1
TABLET ORAL
Qty: 60 TABLET | Refills: 0 | Status: SHIPPED | OUTPATIENT
Start: 2020-07-23 | End: 2020-08-26

## 2020-08-06 ENCOUNTER — OFFICE VISIT (OUTPATIENT)
Dept: INTERNAL MEDICINE CLINIC | Facility: CLINIC | Age: 75
End: 2020-08-06

## 2020-08-06 VITALS
HEART RATE: 80 BPM | SYSTOLIC BLOOD PRESSURE: 134 MMHG | WEIGHT: 100.09 LBS | DIASTOLIC BLOOD PRESSURE: 76 MMHG | TEMPERATURE: 98.2 F | BODY MASS INDEX: 21.01 KG/M2 | HEIGHT: 58 IN

## 2020-08-06 DIAGNOSIS — S49.91XA INJURY OF RIGHT SHOULDER, INITIAL ENCOUNTER: Primary | ICD-10-CM

## 2020-08-06 PROBLEM — I31.9 MYOPERICARDITIS: Status: RESOLVED | Noted: 2019-12-02 | Resolved: 2020-08-06

## 2020-08-06 PROBLEM — R94.31 ABNORMAL EKG: Status: RESOLVED | Noted: 2019-11-30 | Resolved: 2020-08-06

## 2020-08-06 PROBLEM — S90.425A: Status: RESOLVED | Noted: 2019-12-12 | Resolved: 2020-08-06

## 2020-08-06 PROBLEM — R21 RASH OF BACK: Status: RESOLVED | Noted: 2019-12-06 | Resolved: 2020-08-06

## 2020-08-06 PROCEDURE — 1036F TOBACCO NON-USER: CPT | Performed by: PHYSICIAN ASSISTANT

## 2020-08-06 PROCEDURE — 1160F RVW MEDS BY RX/DR IN RCRD: CPT | Performed by: PHYSICIAN ASSISTANT

## 2020-08-06 PROCEDURE — 99213 OFFICE O/P EST LOW 20 MIN: CPT | Performed by: PHYSICIAN ASSISTANT

## 2020-08-06 PROCEDURE — 3008F BODY MASS INDEX DOCD: CPT | Performed by: PHYSICIAN ASSISTANT

## 2020-08-06 PROCEDURE — 4040F PNEUMOC VAC/ADMIN/RCVD: CPT | Performed by: PHYSICIAN ASSISTANT

## 2020-08-06 NOTE — PROGRESS NOTES
Assessment/Plan:  As we reviewed today you are having pain and decreased range of motion in her right shoulder since injury on Saturday August 1st   We also reviewed you may continue to take your naproxen twice a day but you are not to take any additional ibuprofen, Motrin, Advil or Aleve  I have sent a prescription for the diclofenac topical cream that you may apply to the area 3-4 times daily as needed  As per our discussion with difficulty with transportation you would prefer to schedule directly with the orthopedist and not go for outpatient x-rays and then the orthopedist     We did review however that if her pain gets any worse or you feel like her shoulder is coming out as you report before you see the orthopedist please contact our office or go to the emergency room  No problem-specific Assessment & Plan notes found for this encounter  Diagnoses and all orders for this visit:    Injury of right shoulder, initial encounter  -     diclofenac sodium (VOLTAREN) 1 %; Apply 2 g topically 4 (four) times a day for 10 days  -     Ambulatory referral to Orthopedic Surgery; Future          Subjective:      Patient ID: Ezio Patel is a 67 y o  female  Patient presents today for same-day medical home with complaint of right shoulder pain x1 week  Patient reports was hit by a heavy door in her right shoulder and having pain since that time  Denies any bruising but states feels like her shoulder is "going in and out"  Patient states when she does a movement of flexion and adduction will feel it go back in  Sometimes pain wakes her from sleep when roll over  Pain is the same not getting worse  Tried bengay but not really helping      Patient is already on a number of medications for pain secondary to her rheumatoid arthritis  Patient confirm she has been taking her naproxen twice a day but then admits she has also been taking Motrin    Patient educated on not taking both of these medications as they are the same class and to continue her naproxen only as per her rheumatologist     Will provide an additional topical medication she can use  Patient states she does not think it is broken but with the popping in an out she really does need imaging however patient does not have regular transportation and would prefer to go directly to the orthopedist's office and have them to do imaging there rather than outpatient imaging and Ortho appointments  On examination patient did not have any visible deformity and no popping or clicking on movement but did have decreased range of motion secondary to pain  The following portions of the patient's history were reviewed and updated as appropriate: allergies, current medications, past family history, past medical history, past social history, past surgical history and problem list     Review of Systems   Constitutional: Negative  Respiratory: Negative  Cardiovascular: Negative  Gastrointestinal: Negative  Musculoskeletal: Positive for arthralgias and myalgias  Negative for joint swelling  Skin: Negative for color change, rash and wound  Neurological: Negative for weakness and numbness  Patient denies any numbness tingling or radiation of pain from her right shoulder into her right upper extremity  Objective:      /76   Pulse 80   Temp 98 2 °F (36 8 °C)   Ht 4' 10" (1 473 m)   Wt 45 4 kg (100 lb 1 4 oz)   BMI 20 92 kg/m²          Physical Exam   Cardiovascular: Normal rate and regular rhythm  Pulmonary/Chest: Effort normal and breath sounds normal    Musculoskeletal:      Right shoulder: She exhibits decreased range of motion, tenderness and pain  She exhibits no swelling, no effusion and no deformity  Comments: On examination no obvious bony deformities comparing left to right side  Patient does have significantly decreased range of motion however she does not have full range of motion at baseline    With range of motion there was no clicking or popping during the exam however patient reports she feels it popping out and pulls it back in  No bruising, no swelling or erythema  Patient has equal 3/5  strength bilateral upper extremities  Decreased secondary to chronic rheumatoid arthritis  Sensation intact to gross touch distally  Neurological: She is alert  Skin: Skin is warm and dry  No bruising noted  Nursing note and vitals reviewed

## 2020-08-06 NOTE — PATIENT INSTRUCTIONS
As we reviewed today you are having pain and decreased range of motion in her right shoulder since injury on Saturday August 1st   We also reviewed you may continue to take your naproxen twice a day but you are not to take any additional ibuprofen, Motrin, Advil or Aleve  I have sent a prescription for the diclofenac topical cream that you may apply to the area 3-4 times daily as needed  As per our discussion with difficulty with transportation you would prefer to schedule directly with the orthopedist and not go for outpatient x-rays and then the orthopedist     We did review however that if her pain gets any worse or you feel like her shoulder is coming out as you report before you see the orthopedist please contact our office or go to the emergency room

## 2020-08-10 ENCOUNTER — HOSPITAL ENCOUNTER (OUTPATIENT)
Dept: RADIOLOGY | Facility: HOSPITAL | Age: 75
Discharge: HOME | End: 2020-08-10
Attending: ORTHOPAEDIC SURGERY
Payer: MEDICARE

## 2020-08-10 ENCOUNTER — OFFICE VISIT (OUTPATIENT)
Dept: OBGYN CLINIC | Facility: HOSPITAL | Age: 75
End: 2020-08-10
Payer: MEDICARE

## 2020-08-10 VITALS
BODY MASS INDEX: 20.99 KG/M2 | SYSTOLIC BLOOD PRESSURE: 125 MMHG | WEIGHT: 100 LBS | DIASTOLIC BLOOD PRESSURE: 75 MMHG | HEART RATE: 80 BPM | HEIGHT: 58 IN

## 2020-08-10 DIAGNOSIS — M25.511 RIGHT SHOULDER PAIN, UNSPECIFIED CHRONICITY: ICD-10-CM

## 2020-08-10 DIAGNOSIS — M24.811 INTERNAL DERANGEMENT OF RIGHT SHOULDER: Primary | ICD-10-CM

## 2020-08-10 PROCEDURE — 99214 OFFICE O/P EST MOD 30 MIN: CPT | Performed by: ORTHOPAEDIC SURGERY

## 2020-08-10 PROCEDURE — 4040F PNEUMOC VAC/ADMIN/RCVD: CPT | Performed by: ORTHOPAEDIC SURGERY

## 2020-08-10 PROCEDURE — 73030 X-RAY EXAM OF SHOULDER: CPT

## 2020-08-10 PROCEDURE — 1160F RVW MEDS BY RX/DR IN RCRD: CPT | Performed by: ORTHOPAEDIC SURGERY

## 2020-08-10 PROCEDURE — 3008F BODY MASS INDEX DOCD: CPT | Performed by: ORTHOPAEDIC SURGERY

## 2020-08-10 PROCEDURE — 1036F TOBACCO NON-USER: CPT | Performed by: ORTHOPAEDIC SURGERY

## 2020-08-10 NOTE — PROGRESS NOTES
Assessment  Diagnoses and all orders for this visit:    Internal derangement of right shoulder        Discussion and Plan:    · Explained to the patient that due to her acute injury about 2 weeks ago and her significant weakness on exam today about the right shoulder, it is highly suspicious for an acute rotator cuff tear  We will obtain an MRI at this time for right shoulder to evaluate for this possible diagnosis  She was in agreement with this treatment plan wished to proceed  · Follow up after MRI for discussion of results and further treatment options based on these results  Subjective:   Patient ID: Richard Sotomayor is a 67 y o  female      The patient presents with a chief complaint of right shoulder pain  The pain began 2 week(s) ago and is associated with an acute injury  Patient states that she was struck in the right arm by heavy door  The patient describes the pain as aching and dull in intensity,  constant in timing, and localizes the pain to the  right subacromial joint, glenohumeral joint  The pain is worse with overhead work, overuse and raising arm over head and relieved by rest, ice, avoiding the painful activities  The pain is not associated with numbness and tingling  The pain is not associated with constitutional symptoms  The patient is awoken at night by the pain  The patient has had no prior treatment  The following portions of the patient's history were reviewed and updated as appropriate: allergies, current medications, past family history, past medical history, past social history, past surgical history and problem list     Review of Systems   Constitutional: Negative for chills, fever and unexpected weight change  HENT: Negative for hearing loss, nosebleeds and sore throat  Eyes: Negative for pain, redness and visual disturbance  Respiratory: Negative for cough, shortness of breath and wheezing      Cardiovascular: Negative for chest pain, palpitations and leg swelling  Gastrointestinal: Negative for abdominal distention, nausea and vomiting  Endocrine: Negative for polydipsia and polyuria  Genitourinary: Negative for dysuria and hematuria  Skin: Negative for rash and wound  Neurological: Negative for dizziness, numbness and headaches  Psychiatric/Behavioral: Negative for decreased concentration and suicidal ideas  Objective:  /75   Pulse 80   Ht 4' 10" (1 473 m)   Wt 45 4 kg (100 lb) Comment: verbal  BMI 20 90 kg/m²       Right Shoulder Exam     Tenderness   Right shoulder tenderness location: Diffuse  Range of Motion   External rotation: 60   Forward flexion: 160   Internal rotation 0 degrees: Lumbar     Muscle Strength   Abduction: 3/5   External rotation: 4/5     Tests   Drop arm: positive    Other   Erythema: absent  Sensation: normal  Pulse: present            Physical Exam  Constitutional:       Appearance: She is well-developed  Eyes:      Pupils: Pupils are equal, round, and reactive to light  Pulmonary:      Effort: Pulmonary effort is normal       Breath sounds: Normal breath sounds  Skin:     General: Skin is warm and dry  Neurological:      Mental Status: She is alert and oriented to person, place, and time  Psychiatric:         Behavior: Behavior normal          Thought Content: Thought content normal          Judgment: Judgment normal            I have personally reviewed pertinent films in PACS and my interpretation is as follows      X-ray right Shoulder:  No acute osseous abnormality or degenerative changes    Scribe Attestation    I,:   Emma Davis am acting as a scribe while in the presence of the attending physician :        I,:   Torrey Bell MD personally performed the services described in this documentation    as scribed in my presence :

## 2020-08-17 ENCOUNTER — HOSPITAL ENCOUNTER (OUTPATIENT)
Dept: RADIOLOGY | Facility: HOSPITAL | Age: 75
Discharge: HOME | End: 2020-08-17
Attending: ORTHOPAEDIC SURGERY
Payer: MEDICARE

## 2020-08-17 ENCOUNTER — TELEPHONE (OUTPATIENT)
Dept: OBGYN CLINIC | Facility: HOSPITAL | Age: 75
End: 2020-08-17

## 2020-08-17 DIAGNOSIS — M24.811 INTERNAL DERANGEMENT OF RIGHT SHOULDER: ICD-10-CM

## 2020-08-17 PROCEDURE — 73221 MRI JOINT UPR EXTREM W/O DYE: CPT

## 2020-08-17 PROCEDURE — G1004 CDSM NDSC: HCPCS

## 2020-08-17 NOTE — TELEPHONE ENCOUNTER
Patient sees Dr Yael Esposito  Patient is calling because she stated she is in so much pain she doesn't know what to do  She cannot move her arm and its swollen  Patient would like a callback  She does have an appointment scheduled for 8/24        CB: [phone]

## 2020-08-17 NOTE — TELEPHONE ENCOUNTER
Spoke to patient  She stated tylenol does not help  She does take motrin once in a while but not too often and she is using ice  She stated the pain is excruciating  Patient is scheduled for MRI review on Monday  Please advise any further instructions to get her through until then   thanks

## 2020-08-17 NOTE — TELEPHONE ENCOUNTER
Looks like Dr Raffy Shore was concerned for rotator cuff tear and ordered an MRI  The results of this will help them determine treatment  In the meantime, can use Tylenol 650 or 1000mg TID  If able, can take a few days of Advil/Aleve but would limit use 2/2 her age  Can use ice for swelling   Will leave this in Raffy Shore bin in case they have any further instructions

## 2020-08-18 NOTE — TELEPHONE ENCOUNTER
The recommendations as described by Tracy are the recommendations for pain  We will not prescribe pain medication (anything stronger than Tylenol)  She can speak to her PCP about use of NSAIDs if Tylenol at doses recommended and Ice are not helping with symptoms  If ice does not help, she can use heat    Activity modification (avoid positions and activities that cause pain)

## 2020-08-18 NOTE — TELEPHONE ENCOUNTER
Patient's niece Lennox Ober given above information and verbalized understanding  She will pass information on to patient

## 2020-08-24 ENCOUNTER — APPOINTMENT (OUTPATIENT)
Dept: LAB | Facility: HOSPITAL | Age: 75
End: 2020-08-24
Payer: MEDICARE

## 2020-08-24 ENCOUNTER — OFFICE VISIT (OUTPATIENT)
Dept: OBGYN CLINIC | Facility: HOSPITAL | Age: 75
End: 2020-08-24
Payer: MEDICARE

## 2020-08-24 ENCOUNTER — TELEPHONE (OUTPATIENT)
Dept: OBGYN CLINIC | Facility: HOSPITAL | Age: 75
End: 2020-08-24

## 2020-08-24 ENCOUNTER — OFFICE VISIT (OUTPATIENT)
Dept: LAB | Facility: HOSPITAL | Age: 75
End: 2020-08-24
Payer: MEDICARE

## 2020-08-24 ENCOUNTER — ANESTHESIA EVENT (OUTPATIENT)
Dept: PERIOP | Facility: HOSPITAL | Age: 75
DRG: 483 | End: 2020-08-24
Payer: MEDICARE

## 2020-08-24 VITALS
HEIGHT: 58 IN | SYSTOLIC BLOOD PRESSURE: 158 MMHG | DIASTOLIC BLOOD PRESSURE: 84 MMHG | WEIGHT: 99.8 LBS | BODY MASS INDEX: 20.95 KG/M2 | HEART RATE: 86 BPM

## 2020-08-24 DIAGNOSIS — E03.8 HYPOTHYROIDISM DUE TO HASHIMOTO'S THYROIDITIS: ICD-10-CM

## 2020-08-24 DIAGNOSIS — S46.011A TRAUMATIC ROTATOR CUFF TEAR, RIGHT, INITIAL ENCOUNTER: ICD-10-CM

## 2020-08-24 DIAGNOSIS — Z01.812 PRE-OPERATIVE LABORATORY EXAMINATION: Primary | ICD-10-CM

## 2020-08-24 DIAGNOSIS — E03.9 HYPOTHYROIDISM, UNSPECIFIED TYPE: ICD-10-CM

## 2020-08-24 DIAGNOSIS — M19.011 GLENOHUMERAL ARTHRITIS, RIGHT: ICD-10-CM

## 2020-08-24 DIAGNOSIS — E06.3 HYPOTHYROIDISM DUE TO HASHIMOTO'S THYROIDITIS: ICD-10-CM

## 2020-08-24 DIAGNOSIS — M19.011 GLENOHUMERAL ARTHRITIS, RIGHT: Primary | ICD-10-CM

## 2020-08-24 DIAGNOSIS — Z01.812 PRE-OPERATIVE LABORATORY EXAMINATION: ICD-10-CM

## 2020-08-24 LAB
ABO GROUP BLD: NORMAL
ALBUMIN SERPL BCP-MCNC: 3.3 G/DL (ref 3.5–5)
ALP SERPL-CCNC: 79 U/L (ref 46–116)
ALT SERPL W P-5'-P-CCNC: 18 U/L (ref 12–78)
ANION GAP SERPL CALCULATED.3IONS-SCNC: 5 MMOL/L (ref 4–13)
APTT PPP: 26 SECONDS (ref 23–37)
AST SERPL W P-5'-P-CCNC: 16 U/L (ref 5–45)
BASOPHILS # BLD AUTO: 0.01 THOUSANDS/ΜL (ref 0–0.1)
BASOPHILS NFR BLD AUTO: 0 % (ref 0–1)
BILIRUB SERPL-MCNC: 0.46 MG/DL (ref 0.2–1)
BLD GP AB SCN SERPL QL: NEGATIVE
BUN SERPL-MCNC: 9 MG/DL (ref 5–25)
CALCIUM SERPL-MCNC: 8.9 MG/DL (ref 8.3–10.1)
CHLORIDE SERPL-SCNC: 106 MMOL/L (ref 100–108)
CO2 SERPL-SCNC: 30 MMOL/L (ref 21–32)
CREAT SERPL-MCNC: 0.55 MG/DL (ref 0.6–1.3)
CRP SERPL QL: 17.2 MG/L
EOSINOPHIL # BLD AUTO: 0.01 THOUSAND/ΜL (ref 0–0.61)
EOSINOPHIL NFR BLD AUTO: 0 % (ref 0–6)
ERYTHROCYTE [DISTWIDTH] IN BLOOD BY AUTOMATED COUNT: 15 % (ref 11.6–15.1)
EST. AVERAGE GLUCOSE BLD GHB EST-MCNC: 103 MG/DL
FERRITIN SERPL-MCNC: 104 NG/ML (ref 8–388)
GFR SERPL CREATININE-BSD FRML MDRD: 91 ML/MIN/1.73SQ M
GLUCOSE P FAST SERPL-MCNC: 95 MG/DL (ref 65–99)
HBA1C MFR BLD: 5.2 %
HCT VFR BLD AUTO: 35.5 % (ref 34.8–46.1)
HGB BLD-MCNC: 10.9 G/DL (ref 11.5–15.4)
IMM GRANULOCYTES # BLD AUTO: 0.05 THOUSAND/UL (ref 0–0.2)
IMM GRANULOCYTES NFR BLD AUTO: 1 % (ref 0–2)
INR PPP: 0.95 (ref 0.84–1.19)
IRON SATN MFR SERPL: 12 %
IRON SERPL-MCNC: 33 UG/DL (ref 50–170)
LYMPHOCYTES # BLD AUTO: 0.63 THOUSANDS/ΜL (ref 0.6–4.47)
LYMPHOCYTES NFR BLD AUTO: 9 % (ref 14–44)
MCH RBC QN AUTO: 29.8 PG (ref 26.8–34.3)
MCHC RBC AUTO-ENTMCNC: 30.7 G/DL (ref 31.4–37.4)
MCV RBC AUTO: 97 FL (ref 82–98)
MONOCYTES # BLD AUTO: 0.36 THOUSAND/ΜL (ref 0.17–1.22)
MONOCYTES NFR BLD AUTO: 5 % (ref 4–12)
NEUTROPHILS # BLD AUTO: 6.15 THOUSANDS/ΜL (ref 1.85–7.62)
NEUTS SEG NFR BLD AUTO: 85 % (ref 43–75)
NRBC BLD AUTO-RTO: 0 /100 WBCS
PLATELET # BLD AUTO: 378 THOUSANDS/UL (ref 149–390)
PMV BLD AUTO: 8.9 FL (ref 8.9–12.7)
POTASSIUM SERPL-SCNC: 3.8 MMOL/L (ref 3.5–5.3)
PROT SERPL-MCNC: 6.9 G/DL (ref 6.4–8.2)
PROTHROMBIN TIME: 12.7 SECONDS (ref 11.6–14.5)
RBC # BLD AUTO: 3.66 MILLION/UL (ref 3.81–5.12)
RH BLD: POSITIVE
SODIUM SERPL-SCNC: 141 MMOL/L (ref 136–145)
SPECIMEN EXPIRATION DATE: NORMAL
T4 FREE SERPL-MCNC: 0.96 NG/DL (ref 0.76–1.46)
TIBC SERPL-MCNC: 270 UG/DL (ref 250–450)
TSH SERPL DL<=0.05 MIU/L-ACNC: 1.42 UIU/ML (ref 0.36–3.74)
WBC # BLD AUTO: 7.21 THOUSAND/UL (ref 4.31–10.16)

## 2020-08-24 PROCEDURE — 1160F RVW MEDS BY RX/DR IN RCRD: CPT | Performed by: ORTHOPAEDIC SURGERY

## 2020-08-24 PROCEDURE — 85610 PROTHROMBIN TIME: CPT

## 2020-08-24 PROCEDURE — 80053 COMPREHEN METABOLIC PANEL: CPT

## 2020-08-24 PROCEDURE — 1036F TOBACCO NON-USER: CPT | Performed by: ORTHOPAEDIC SURGERY

## 2020-08-24 PROCEDURE — 84443 ASSAY THYROID STIM HORMONE: CPT

## 2020-08-24 PROCEDURE — 3008F BODY MASS INDEX DOCD: CPT | Performed by: ORTHOPAEDIC SURGERY

## 2020-08-24 PROCEDURE — 83036 HEMOGLOBIN GLYCOSYLATED A1C: CPT

## 2020-08-24 PROCEDURE — 99214 OFFICE O/P EST MOD 30 MIN: CPT | Performed by: ORTHOPAEDIC SURGERY

## 2020-08-24 PROCEDURE — 36415 COLL VENOUS BLD VENIPUNCTURE: CPT

## 2020-08-24 PROCEDURE — 83540 ASSAY OF IRON: CPT

## 2020-08-24 PROCEDURE — 84439 ASSAY OF FREE THYROXINE: CPT

## 2020-08-24 PROCEDURE — 86901 BLOOD TYPING SEROLOGIC RH(D): CPT

## 2020-08-24 PROCEDURE — 86850 RBC ANTIBODY SCREEN: CPT

## 2020-08-24 PROCEDURE — 85025 COMPLETE CBC W/AUTO DIFF WBC: CPT

## 2020-08-24 PROCEDURE — 82728 ASSAY OF FERRITIN: CPT

## 2020-08-24 PROCEDURE — 4040F PNEUMOC VAC/ADMIN/RCVD: CPT | Performed by: ORTHOPAEDIC SURGERY

## 2020-08-24 PROCEDURE — 85730 THROMBOPLASTIN TIME PARTIAL: CPT

## 2020-08-24 PROCEDURE — 86900 BLOOD TYPING SEROLOGIC ABO: CPT

## 2020-08-24 PROCEDURE — 93005 ELECTROCARDIOGRAM TRACING: CPT

## 2020-08-24 PROCEDURE — 86140 C-REACTIVE PROTEIN: CPT

## 2020-08-24 PROCEDURE — 83550 IRON BINDING TEST: CPT

## 2020-08-24 RX ORDER — CHLORHEXIDINE GLUCONATE 0.12 MG/ML
15 RINSE ORAL ONCE
Status: CANCELLED | OUTPATIENT
Start: 2020-08-24 | End: 2020-08-24

## 2020-08-24 RX ORDER — CEFAZOLIN SODIUM 2 G/50ML
2000 SOLUTION INTRAVENOUS ONCE
Status: CANCELLED | OUTPATIENT
Start: 2020-09-02 | End: 2020-08-24

## 2020-08-24 NOTE — TELEPHONE ENCOUNTER
Sx Dr Kiran Ng 09 02  RE: EKG  CB#     Maria G Stephens from [facility] called asking if patient needs an EKG    If yes, an order needs to be placed in epic    Thank you

## 2020-08-24 NOTE — PROGRESS NOTES
I personally examined the patient and reviewed the history provided  I agree with the note and the assessment and plan by Dr Mary Campbell MD        Assessment:    Acute right massive supraspinatus and subscapularis tear with long-head biceps tendon subluxation and early glenohumeral degenerative change    Plan:    Reviewed with the patient the given the constellation of findings, her age and the lack of reproducible healing of these massive tears in patients over the age of 79 I do recommend treatment in the form of reverse total shoulder arthroplasty  We discussed the use of other adjunct including injections and formal physical therapy but the patient is adamant about proceeding forward with surgical scheduling  She will be scheduled for right reverse total shoulder arthroplasty for massive rotator cuff tear  A thorough discussion was performed with the patient reviewing all operative and nonoperative options as well as the risks of the procedure  Risks discussed include but not limited to persistent pain, dislocation, loosening of the prosthesis, infection, need for further surgery including revision, any remaining rotator cuff rupture , neurovascular injury, as well as the risk of anesthesia  After this discussion all questions were answered and informed consent was obtained for Reverse Total Shoulder Arthroplasty of the right shoulder  Preoperative CT blue print will be obtained for surgical planning              Assessment  Diagnoses and all orders for this visit:    Traumatic rotator cuff tear, right shoulder  Glenohumeral arthritis, right shoulder      Discussion and Plan:    · Discussion held with patient regarding the MRI findings  She has complete tear of her rotator cuff in the right shoulder along with glenohumeral arthritis  Options discussed between doing conservative management, rotator cuff repair and reverse total shoulder arthroplasty of the right shoulder   Patient agreed to proceed with reverse total shoulder arthroplasty  · CT right shoulder blueprint ordered for surgical planning purposes  · Patient will be fit for sling today  · She will be scheduled for right reverse TSA  · Follow up in the clinic after surgery    Subjective:   Patient ID: Mary Grace is a 67 y o  female      The patient presents for follow up to discuss her Right shoulder MRI  Approx 1 month ago, her Right shoulder got injured after it was hit with a heavy door  She continues to complain of dysfunction and pain on her right shoulder  She uses a walker for ambulation and has been having difficulty using it  She is RHD  Denies new falls or new trauma  The patient has had no prior treatment  The following portions of the patient's history were reviewed and updated as appropriate: allergies, current medications, past family history, past medical history, past social history, past surgical history and problem list     Review of Systems   Constitutional: Negative for appetite change, chills, fever and unexpected weight change  HENT: Negative for sore throat  Eyes: Negative for pain, redness and visual disturbance  Respiratory: Negative for cough, shortness of breath and wheezing  Cardiovascular: Negative for palpitations  Gastrointestinal: Negative for nausea and vomiting  Musculoskeletal: Positive for arthralgias and myalgias  Skin: Negative for rash and wound  Neurological: Negative for numbness and headaches  Objective:  /84   Pulse 86   Ht 4' 10" (1 473 m)   Wt 45 3 kg (99 lb 12 8 oz)   BMI 20 86 kg/m²       Right Shoulder Exam     Tenderness   Right shoulder tenderness location: TTP globally  Muscle Strength   Abduction: 3/5   External rotation: 4/5     Tests   Drop arm: positive    Other   Erythema: absent  Sensation: normal  Pulse: present            Physical Exam  Vitals signs and nursing note reviewed  Constitutional:       Appearance: She is well-developed     HENT: Head: Normocephalic and atraumatic  Eyes:      Pupils: Pupils are equal, round, and reactive to light  Neck:      Musculoskeletal: Normal range of motion and neck supple  Cardiovascular:      Rate and Rhythm: Normal rate and regular rhythm  Heart sounds: Normal heart sounds  Pulmonary:      Effort: Pulmonary effort is normal  No respiratory distress  Breath sounds: Normal breath sounds  Abdominal:      General: There is no distension  Palpations: Abdomen is soft  Skin:     General: Skin is warm and dry  Neurological:      Mental Status: She is alert and oriented to person, place, and time  Psychiatric:         Behavior: Behavior normal          Thought Content: Thought content normal          Judgment: Judgment normal            I have personally reviewed pertinent films in PACS and my interpretation is as follows      MRI right shoulder 8/17/2020 shows tears along the subscapularis with retraction, there is full thickness tear of the supraspinatus tendon with retraction, moderate glenohumeral arthritic changes

## 2020-08-24 NOTE — PATIENT INSTRUCTIONS
What to Expect Before and After Shoulder Replacement Surgery  You are being scheduled for a shoulder replacement by Dr Anoop Sales to treat your shoulder condition  Here is some information which may help to answer questions that you may have  Please do not hesitate to reach out to our team to answer questions not addressed here  Before Surgery  You will be contacted the evening prior to your surgery to confirm the scheduled time of the procedure and when to arrive at the hospital    Do not eat or drink anything after midnight the night before your surgery so that the anesthesia can be performed safely  If you have been fitted for a sling in the office prior to the surgery please remember to bring it to the hospital   You will meet the anesthesiologist the morning of the surgery  The surgery is performed under a general anesthetic but they will also offer you a regional block (shot to numb the arm) to help control your post-operative pain as well as with a catheter that is left in place after the block  The catheter is connected to a small pump which will continue to provide numbing medicine and help prolong the pain control from the block  Unfortunately this catheter is not as effective as the initial block, but can still be very helpful in managing the pain  After Surgery and in the Hospital  The shoulder replacement surgery typically takes 60-90 minutes  When surgery is completed, your surgeon will update your family and friends on your condition and progress  You will remain in the recovery room for at least an hour or until the anesthesia has worn off and your blood pressure and pulse are stable  If you have pain, the nurses will give you medication  Once out of surgery, your surgeon will decide on how long you will be using a sling in order to protect and position your shoulder  However, this won't keep you from starting physical therapy    Exercises typically begin on the day after surgery with emphasis on moving the shoulder, wrist, and hand  The physical therapist will be provided with a detailed protocol but typically the first 6 weeks are used to regain range of motion and then strengthening is initiated  Starting strengthening exercises too early may lead to complications  When You Are Discharged from the [address] [place] [address] can expect to be released from the hospital the day after surgery (this may change if you have special needs or medical conditions)  Before you are released, the treatment team (Orthopaedic surgery residents, physician assistants and physical therapists) will talk with you about the importance of limiting any sudden or stressful movements to the arm for several weeks or longer  Activities that involve pushing, pulling, and lifting should not be done until you are given permission from your surgeon  Your First Day at [address] [place] [address] may need help with your daily activities, so it is a good idea to have family and friends prepared to help you  It is okay to remove the sling and let the arm hang at the side so that you can get cleaned and change your clothes  To put on a shirt, place the bad arm in first and then the good arm  Reverse to take it off  Don't forget to wear the sling every night for at least the first month after surgery, and never use your arm to push yourself up in bed or from a chair  The added weight on your shoulder may cause you to re-injure the joint  How to [place] in the First Week  You are encouraged to return to your normal eating and sleeping patterns as soon as possible  It is important for you to be active in order to control your weight and muscle tone  It is ok to increase your activity level and even perform light aerobic exercise (like walking or riding a stationary bike) within the first week or so if you are feeling up to it    If there is concern about these activates best to wait until the first post-operative visit and discuss this with the team    Collin Tomlin might be able to return to work within several days if you can perform your job while wearing a sling  Consult with your doctor, as this differs from patient to patient  However, if your job requires heavy lifting or climbing, there may be a delay for several months  Until you are seen for your first follow-up visit, please try and keep wound dry  It is okay to shower but try your best to keep the incision out of direct contact with the water (or consider using a waterproof bandage)  If it does gets wet please dry as best as possible afterwards  If you notice any drainage or a foul odor from your incision or your temperature goes above 101 5 degrees, please contact the office  What You Can Expect in the First Month  Your first post-operative appointment will be with Dr Raman Loya physician assistant (PA) around 2 weeks after the surgery  You skin staples will be removed and X-rays will be obtained at that visit  Please understand that it is quite common to still experience pain at that time but the pain should be steadily improving  Hopefully physical therapy has already begun but if not it will be initiated at this visit and will continue for the 8-12 weeks  At about 12 weeks after surgery you will start a progressive strengthening program  Physical therapy is a deliberate process of not only strengthening your shoulder but also altering how you use your arm  It may be many months before your desired results are achieved, so do not get discouraged  Your shoulder will generally continue to improve steadily up to 6-8 months after surgery  After that point further improvement is very slow; although it has been shown that even after a year or more, activity can increase as muscle strength continues to improve  After the First Month at Home   Because each person heals differently, there are different recovery timelines  An average recovery period typically lasts about between 3-6 months  Talk with your surgeon about which activities will be appropriate for you once you have recovered

## 2020-08-25 LAB
ATRIAL RATE: 76 BPM
P AXIS: 73 DEGREES
PR INTERVAL: 122 MS
QRS AXIS: 90 DEGREES
QRSD INTERVAL: 124 MS
QT INTERVAL: 424 MS
QTC INTERVAL: 477 MS
T WAVE AXIS: 72 DEGREES
VENTRICULAR RATE: 76 BPM

## 2020-08-25 PROCEDURE — 93010 ELECTROCARDIOGRAM REPORT: CPT | Performed by: INTERNAL MEDICINE

## 2020-08-26 ENCOUNTER — HOSPITAL ENCOUNTER (OUTPATIENT)
Dept: RADIOLOGY | Facility: HOSPITAL | Age: 75
Discharge: HOME | End: 2020-08-26
Payer: MEDICARE

## 2020-08-26 ENCOUNTER — TRANSCRIBE ORDERS (OUTPATIENT)
Dept: RADIOLOGY | Facility: HOSPITAL | Age: 75
End: 2020-08-26

## 2020-08-26 DIAGNOSIS — S46.011A TRAUMATIC ROTATOR CUFF TEAR, RIGHT, INITIAL ENCOUNTER: ICD-10-CM

## 2020-08-26 DIAGNOSIS — G89.4 CHRONIC PAIN SYNDROME: ICD-10-CM

## 2020-08-26 DIAGNOSIS — M19.011 GLENOHUMERAL ARTHRITIS, RIGHT: ICD-10-CM

## 2020-08-26 PROCEDURE — G1004 CDSM NDSC: HCPCS

## 2020-08-26 PROCEDURE — 73200 CT UPPER EXTREMITY W/O DYE: CPT

## 2020-08-26 RX ORDER — NAPROXEN 500 MG/1
TABLET ORAL
Qty: 60 TABLET | Refills: 0 | Status: SHIPPED | OUTPATIENT
Start: 2020-08-26 | End: 2020-09-08 | Stop reason: HOSPADM

## 2020-08-28 DIAGNOSIS — M19.011 GLENOHUMERAL ARTHRITIS, RIGHT: ICD-10-CM

## 2020-08-28 DIAGNOSIS — S46.011A TRAUMATIC ROTATOR CUFF TEAR, RIGHT, INITIAL ENCOUNTER: ICD-10-CM

## 2020-08-28 PROCEDURE — U0003 INFECTIOUS AGENT DETECTION BY NUCLEIC ACID (DNA OR RNA); SEVERE ACUTE RESPIRATORY SYNDROME CORONAVIRUS 2 (SARS-COV-2) (CORONAVIRUS DISEASE [COVID-19]), AMPLIFIED PROBE TECHNIQUE, MAKING USE OF HIGH THROUGHPUT TECHNOLOGIES AS DESCRIBED BY CMS-2020-01-R: HCPCS | Performed by: ORTHOPAEDIC SURGERY

## 2020-08-29 LAB — SARS-COV-2 RNA SPEC QL NAA+PROBE: NOT DETECTED

## 2020-08-31 PROBLEM — M32.8 OTHER FORMS OF SYSTEMIC LUPUS ERYTHEMATOSUS (HCC): Status: ACTIVE | Noted: 2020-08-31

## 2020-08-31 PROBLEM — M32.12 SYSTEMIC LUPUS ERYTHEMATOSUS (SLE) WITH PERICARDITIS (HCC): Status: RESOLVED | Noted: 2019-11-30 | Resolved: 2020-08-31

## 2020-08-31 NOTE — PROGRESS NOTES
Assessment/Plan: We reviewed all of your pre-admission testing for your scheduled right shoulder replacement on September 2nd  All of her labs are normal except for your hemoglobin is slightly below normal and your iron level is also below normal   We also reviewed that this will not prevent you from having your surgery as scheduled but that we do need to complete follow-up testing regarding your iron deficiency anemia  We also discussed that some of this is due to your autoimmune conditions of rheumatoid arthritis and lupus but you also are not eating a healthy diet and low in iron rich foods  Please get the follow-up blood test for screening of celiac disease and may need referral for hematologist and GI once you recover from your shoulder surgery  Medication list has been updated reflecting your current rheumatology medications  Labs are stable  You confirm no adverse reactions to anesthesia in the past   For urgent surgery patient is medically optimized and cleared for right shoulder replacement surgery as scheduled on September 2, 2020    No problem-specific Assessment & Plan notes found for this encounter  Diagnoses and all orders for this visit:    Pre-op evaluation    Traumatic rotator cuff tear, right, subsequent encounter    Hypothyroidism due to Hashimoto's thyroiditis    Rheumatoid arthritis involving multiple sites with positive rheumatoid factor (HCC)  -     methotrexate 2 5 MG tablet; 3 tablets by mouth once a week on Thursday    Other forms of systemic lupus erythematosus, unspecified organ involvement status (HCC)    RBBB    Low serum iron  -     Celiac Disease Diagnostic Panel; Future    Other orders  -     predniSONE 10 mg tablet; Take by mouth daily          Subjective:      Patient ID: Maico Arevalo is a 67 y o  female  Patient presents today for preop clearance for rotator cuff surgery      Patient had initially presented to me after reporting an injury of a heavy door striking her in the arm resulting in pain and decreased range of motion of her right shoulder  Quick follow-up appointment scheduled with the orthopedist   Patient underwent MRI which confirmed rotator cuff tear as well as tear of her biceps tendon  In addition to her osteoarthritis and rheumatoid degenerative changes  Subsequent to this patient is now scheduled for shoulder replacement on September 2nd  Patient confirms no adverse reaction to anesthesia in the past including no rash, itching no nausea or vomiting  Patient confirm she stop taking the naproxen last week  Updated patient's medications from the rheumatologist including prednisone 10 mg daily, methotrexate 3 tablets together once per week on Thursdays and her Plaquenil 200 mg daily  Patient did complete preop testing  PT INR normal   Thyroid function A1c liver functions and kidney functions are normal     Patient's labs do show normocytic normochromic anemia with decreased RBC and hemoglobin  RDW is normal, iron testing was also completed which shows low iron stores of 33 with normal TIBC  Patient likely has low iron store secondary to diet and anemia of chronic disease secondary to her rheumatoid arthritis as well as lupus  Follow-up C reactive protein does show significant improvement  Patient also has known osteoporosis also managed by her rheumatologist     Patient will need further evaluation for the iron deficiency with possible referral to GI as well as celiac screening and dietary advice  This however will not prevent her from surgery as scheduled  States was on iron in the past and constipated so will not take  Poor diet admits only eating once or twice a day  Reviewed with patient that I would like her to get the follow-up blood test once she recovers from her surgery and we will then discuss with her additional options for her anemia which may include referral to hematology    In the meantime did provide patient with information on iron rich foods  Subjective:    Walt Fountain is a 67 y o  female who presents to the office today for a preoperative consultation at the request of surgeon Dr Ilda Cope who plans on performing R shoulder replacement on September 2  This consultation is requested for the specific conditions prompting preoperative evaluation (i e  because of potential affect on operative risk): RA, Lupus advanced age and frail  Planned anesthesia: general  The patient has the following known anesthesia issues: None  Patients bleeding risk: no recent abnormal bleeding, no remote history of abnormal bleeding and no use of Ca-channel blockers  Patient does not have objections to receiving blood products if needed  Predictors of intubation difficulty:   Morbid obesity? no   Anatomically abnormal facies?  no   Prominent incisors? no   Receding mandible? no   Short, thick neck? no   Neck range of motion: abnormal decreased cervical ROM to rotation     Cardiographics  ECG: no change since previous ECG dated 12/3/2019 known RBBB otherwise NSR  Imaging    Labs  Component                                    Latest Ref Rng & Units                   8/24/2020                                                                                                                             Sodium                                       136 - 145 mmol/L                         141                     Potassium                                    3 5 - 5 3 mmol/L                         3 8                     Chloride                                     100 - 108 mmol/L                         106                     CO2                                          21 - 32 mmol/L                           30                      Anion Gap                                    4 - 13 mmol/L                            5                       BUN                                          5 - 25 mg/dL                             9                       Creatinine 0 60 - 1 30 mg/dL                        0 55 (L)                GLUCOSE FASTING                              65 - 99 mg/dL                            95                      Calcium                                      8 3 - 10 1 mg/dL                         8 9                     AST                                          5 - 45 U/L                               16                      ALT                                          12 - 78 U/L                              18                      Alkaline Phosphatase                         46 - 116 U/L                             79                      Total Protein                                6 4 - 8 2 g/dL                           6 9                     Albumin                                      3 5 - 5 0 g/dL                           3 3 (L)                 TOTAL BILIRUBIN                              0 20 - 1 00 mg/dL                        0 46                    eGFR                                         ml/min/1 73sq m                          91                        Component                                                                                                                                                                                                                                Hemoglobin A1C         5 2                     eAG, EST AVG Glucose                                                                                                                                                                                                                         103                     Component                                    Latest Ref Rng & Units                   8/24/2020               Protime                                      11 6 - 14 5 seconds                      12 7                    INR                                          0 84 - 1 19                              0 95 Component                                         Latest Ref Rng & Units                      8/24/2020               WBC                                               4 31 - 10 16 Thousand/uL                    7 21                    Red Blood Cell Count                              3 81 - 5 12 Million/uL                      3 66 (L)                Hemoglobin                                        11 5 - 15 4 g/dL                            10 9 (L)                HCT                                               34 8 - 46 1 %                               35 5                    MCV                                               82 - 98 fL                                  97                      MCH                                               26 8 - 34 3 pg                              29 8                    MCHC                                              31 4 - 37 4 g/dL                            30 7 (L)                RDW                                               11 6 - 15 1 %                               15 0                    MPV                                               8 9 - 12 7 fL                               8 9                     Platelet Count                                    149 - 390 Thousands/uL                      378                     nRBC                                              /100 WBCs                                   0                       Neutrophils %                                     43 - 75 %                                   85 (H)                  Immat GRANS %                                     0 - 2 %                                     1                       Lymphocytes Relative                              14 - 44 %                                   9 (L)                   Monocytes Relative                                4 - 12 %                                    5                       Eosinophils                                       0 - 6 %                                     0                       Basophils Relative                                0 - 1 %                                     0                       Absolute Neutrophils                              1 85 - 7 62 Thousands/µL                    6 15                    Immature Grans Absolute                           0 00 - 0 20 Thousand/uL                     0 05                    Lymphocytes Absolute                              0 60 - 4 47 Thousands/µL                    0 63                    Absolute Monocytes                                0 17 - 1 22 Thousand/µL                     0 36                    Absolute Eosinophils                              0 00 - 0 61 Thousand/µL                     0 01                    Basophils Absolute                                0 00 - 0 10 Thousands/µL                    0 01                    Segs Relative                                     43 - 75 %                                                           Lymphocytes %                                     14 - 44 %                                                           Monocytes                                         4 - 12 %                                                            Metamyelocytes%                                   0 - 1 %                                                             Myelocytes %                                      0 - 1 %                                                             Atypical Lymphocytes %                            <=0 %                                                               Abs Neutrophils                                   1 85 - 7 62 Thousand/uL                                             LYMPHOCYTES ABSOLUTE                              0 60 - 4 47 Thousand/uL                                             Monocytes Absolute                                0 00 - 1 22 Thousand/uL RBC Morphology                                                                                                        Anisocytosis                                                                                                          Poikilocytes                                                                                                          Polychromasia                                                                                                         Platelet Estimate                                 Adequate                                                            Giant PLTs                                                                                                            Bands Relative                                    0 - 8 %                                                               Component                                    Latest Ref Rng & Units                   8/24/2020               Iron Saturation                              %                                        12                      TIBC                                         250 - 450 ug/dL                          270                     Iron                                         50 - 170 ug/dL                           33 (L)                    PAT COVID not detected            Assessment:    67 y o  female with planned surgery as above  Known risk factors for perioperative complications: Anemia  Undernutrition  RA / Lupus    Difficulty with intubation is not anticipated  The following portions of the patient's history were reviewed and updated as appropriate: allergies, current medications, past family history, past medical history, past social history, past surgical history and problem list     Review of Systems   Constitutional: Positive for fatigue (chronic unchanged)  Negative for appetite change, chills and fever  HENT: Negative    Negative for facial swelling, sore throat and trouble swallowing  Eyes: Negative  Respiratory: Negative  Negative for cough, chest tightness and shortness of breath  Cardiovascular: Negative  Negative for chest pain  Gastrointestinal: Negative  Negative for abdominal pain, diarrhea and vomiting  Endocrine: Negative  Negative for cold intolerance and heat intolerance  Musculoskeletal: Positive for arthralgias, joint swelling and myalgias  Skin: Negative  Negative for rash  Neurological: Negative for dizziness, seizures, light-headedness and headaches  Psychiatric/Behavioral: The patient is nervous/anxious (slightly anxious about surgery)  Objective:      /76 (BP Location: Left arm, Patient Position: Sitting, Cuff Size: Standard)   Pulse 78   Temp 97 8 °F (36 6 °C) (Temporal)   Ht 4' 10" (1 473 m)   Wt 44 kg (97 lb)   SpO2 92%   BMI 20 27 kg/m²          Physical Exam  Constitutional:       Appearance: She is normal weight  Comments: Pleasant, frail   HENT:      Head: Normocephalic  Nose: Nose normal       Mouth/Throat:      Mouth: Mucous membranes are moist       Pharynx: Oropharynx is clear  Comments: Patent oropharynx  Eyes:      Conjunctiva/sclera: Conjunctivae normal       Pupils: Pupils are equal, round, and reactive to light  Neck:      Musculoskeletal: No muscular tenderness  Vascular: No carotid bruit  Cardiovascular:      Rate and Rhythm: Normal rate and regular rhythm  Heart sounds: Normal heart sounds  Pulmonary:      Effort: Pulmonary effort is normal       Breath sounds: Normal breath sounds  No wheezing or rales  Abdominal:      General: Bowel sounds are normal       Tenderness: There is no abdominal tenderness  Musculoskeletal:         General: Tenderness and deformity present  Right lower leg: No edema  Left lower leg: No edema  Comments: Patient does have bony deformities of PIP due to rheumatoid and DIP due to osteoarthritis    Patient has slight kyphosis  Right shoulder patient has significantly decreased range of motion all planes  Movement in any direction increases pain  Patient is however able to  her walker for stable ambulation   Lymphadenopathy:      Cervical: No cervical adenopathy  Skin:     General: Skin is warm and dry  Neurological:      Mental Status: She is alert  Coordination: Coordination normal       Gait: Gait abnormal       Deep Tendon Reflexes: Reflexes normal       Comments: Patient ambulates with the assistance of a walker  Gait is stable but slow   Psychiatric:         Mood and Affect: Mood normal          Thought Content:  Thought content normal

## 2020-09-01 ENCOUNTER — OFFICE VISIT (OUTPATIENT)
Dept: INTERNAL MEDICINE CLINIC | Facility: CLINIC | Age: 75
End: 2020-09-01

## 2020-09-01 VITALS
BODY MASS INDEX: 20.36 KG/M2 | OXYGEN SATURATION: 92 % | SYSTOLIC BLOOD PRESSURE: 122 MMHG | HEIGHT: 58 IN | HEART RATE: 78 BPM | WEIGHT: 97 LBS | DIASTOLIC BLOOD PRESSURE: 76 MMHG | TEMPERATURE: 97.8 F

## 2020-09-01 DIAGNOSIS — E06.3 HYPOTHYROIDISM DUE TO HASHIMOTO'S THYROIDITIS: ICD-10-CM

## 2020-09-01 DIAGNOSIS — Z01.818 PRE-OP EVALUATION: Primary | ICD-10-CM

## 2020-09-01 DIAGNOSIS — E61.1 LOW SERUM IRON: ICD-10-CM

## 2020-09-01 DIAGNOSIS — E03.8 HYPOTHYROIDISM DUE TO HASHIMOTO'S THYROIDITIS: ICD-10-CM

## 2020-09-01 DIAGNOSIS — M05.79 RHEUMATOID ARTHRITIS INVOLVING MULTIPLE SITES WITH POSITIVE RHEUMATOID FACTOR (HCC): ICD-10-CM

## 2020-09-01 DIAGNOSIS — S46.011D TRAUMATIC ROTATOR CUFF TEAR, RIGHT, SUBSEQUENT ENCOUNTER: ICD-10-CM

## 2020-09-01 DIAGNOSIS — M32.8 OTHER FORMS OF SYSTEMIC LUPUS ERYTHEMATOSUS, UNSPECIFIED ORGAN INVOLVEMENT STATUS (HCC): ICD-10-CM

## 2020-09-01 DIAGNOSIS — I45.10 RBBB: ICD-10-CM

## 2020-09-01 PROCEDURE — 99202 OFFICE O/P NEW SF 15 MIN: CPT | Performed by: PHYSICIAN ASSISTANT

## 2020-09-01 RX ORDER — METHOTREXATE 2.5 MG/1
TABLET ORAL
Start: 2020-09-01

## 2020-09-01 RX ORDER — PREDNISONE 10 MG/1
TABLET ORAL DAILY
COMMUNITY
End: 2021-05-20 | Stop reason: ALTCHOICE

## 2020-09-01 NOTE — PATIENT INSTRUCTIONS
We reviewed all of your pre-admission testing for your scheduled right shoulder replacement on September 2nd  All of her labs are normal except for your hemoglobin is slightly below normal and your iron level is also below normal   We also reviewed that this will not prevent you from having your surgery as scheduled but that we do need to complete follow-up testing regarding your iron deficiency anemia  We also discussed that some of this is due to your autoimmune conditions of rheumatoid arthritis and lupus but you also are not eating a healthy diet and low in iron rich foods  Please get the follow-up blood test for screening of celiac disease and may need referral for hematologist and GI once you recover from your shoulder surgery  Medication list has been updated reflecting your current rheumatology medications  Labs are stable  You confirm no adverse reactions to anesthesia in the past   For urgent surgery patient is medically optimized and cleared for right shoulder replacement surgery as scheduled on September 2, 2020        Iron Rich Diet   WHAT YOU NEED TO KNOW:   What is an iron-rich diet? An iron-rich diet includes foods that are good sources of iron  People need extra iron during childhood, adolescence (teenage years), and pregnancy  Iron is a mineral that your body needs to make hemoglobin  Hemoglobin is part of your blood and helps carry oxygen from your lungs to the rest of your body  You may need to eat more iron-rich foods to treat or prevent a low blood iron level or iron deficiency anemia  How much iron do I need each day?    · Males:      ¨ 3to 1years old: 7 mg    ¨ 3to 6years old: 10 mg    ¨ 5to 15years old: 8 mg    ¨ 15to 25years old: 11 mg    ¨ 19 years and older: 8 mg    · Females:      ¨ 3to 1years old: 7 mg    ¨ 3to 6years old: 10 mg    ¨ 5to 15years old: 8 mg    ¨ 15to 25years old: 15 mg    ¨ 19 to 50 years: 18 mg    ¨ Over 46years old: 8 mg    ¨ Pregnant women: 27 mg  Which foods contain iron? · Meat, fish, and poultry are good sources of iron  They contain heme iron, a form of iron that your body absorbs very well  Fruit, vegetables, eggs, and grains such as pasta, rice, and cereal also contain iron  They contain nonheme iron, a form of iron that is not absorbed as well as heme iron  You can absorb more iron from these foods by eating a food that is high in vitamin C at the same time  You can also absorb more nonheme iron by eating a food from the meat, fish, and poultry group at the same time  · Fish and shellfish contain some mercury, a metal that can be harmful  Children and unborn babies are at higher risk for harm caused by mercury  Children and pregnant women should avoid eating fish high in mercury, such as shark and swordfish  They should also eat only fish that are lower in mercury, such as salmon, canned light tuna, and catfish  Limit the amount of low-mercury fish and shellfish you eat to less than 12 ounces per week  What are some iron-rich foods? · Foods that contain 2 mg or more per serving:      ¨ 3 ounces of cooked beef (angelica, eye of round) or cooked turkey (dark meat)    ¨ ½ cup of beans (black, kidney, or lentil, or soybeans)    ¨ ½ cup of tofu    ¨ 1 medium baked potato    ¨ 1 cup of cooked artichoke or cooked spinach    ¨ ¾ cup of instant oatmeal    ¨ 1 cup of corn flakes    · Foods that contain 1 to 2 mg per serving:      ¨ 3 ounces of chicken    ¨ 3 ounces of pork    ¨ 3 ounces of turkey (light meat)    ¨ 3 ounces of light tuna    ¨ ½ cup of seedless, packed raisins    ¨ 1 slice of whole-wheat or white bread  What are good sources of vitamin C? Eat a serving of vitamin C with any iron-rich food to help your body absorb more iron   The following fruits and vegetables are good sources of vitamin C:  · 1 cup of fresh orange juice (124 mg) or pink grapefruit juice (83 mg)    · 1 cup of strawberries (106 mg)    · 1 cup of diced cantaloupe (68 mg) · 1 cup of sweet yellow pepper (283 mg)    · 1 cup of fresh, boiled broccoli (116 mg) or cooked brussels sprouts (97 mg)    · 1 cup of kale (53 mg)    · 1 cup of tomato juice (45 mg)  What other guidelines should I follow? · Tea and coffee can decrease the amount of iron that your body absorbs from iron-rich foods  Drink coffee and tea separately from meals that contain iron-rich foods  · Children over the age of 1 year only need about 24 ounces of cow's milk each day  When children drink too much milk, they may eat fewer iron-rich foods  This may cause them to have a low level of iron in their blood  What are the risks of not following an iron-rich diet? If you do not include iron-rich foods and vitamin C in your diet every day, you may have low blood iron levels  This may lead to iron deficiency anemia, especially during periods when your body needs extra iron  Iron deficiency anemia may cause problems with your child's growth and development  If you have iron deficiency anemia, you may develop other health problems  CARE AGREEMENT:   You have the right to help plan your care  Discuss treatment options with your caregivers to decide what care you want to receive  You always have the right to refuse treatment  The above information is an  only  It is not intended as medical advice for individual conditions or treatments  Talk to your doctor, nurse or pharmacist before following any medical regimen to see if it is safe and effective for you  © 2017 [employer] Information is for End User's use only and may not be sold, redistributed or otherwise used for commercial purposes  All illustrations and images included in CareNotes® are the copyrighted property of [employer]  or [employer]

## 2020-09-02 ENCOUNTER — ANESTHESIA (OUTPATIENT)
Dept: PERIOP | Facility: HOSPITAL | Age: 75
DRG: 483 | End: 2020-09-02
Payer: MEDICARE

## 2020-09-02 ENCOUNTER — HOSPITAL ENCOUNTER (INPATIENT)
Facility: HOSPITAL | Age: 75
LOS: 6 days | Discharge: NURSING, REHAB OR LONG-TERM CARE | DRG: 483 | End: 2020-09-08
Attending: ORTHOPAEDIC SURGERY | Admitting: ORTHOPAEDIC SURGERY
Payer: MEDICARE

## 2020-09-02 ENCOUNTER — APPOINTMENT (INPATIENT)
Dept: RADIOLOGY | Facility: HOSPITAL | Age: 75
DRG: 483 | End: 2020-09-02
Payer: MEDICARE

## 2020-09-02 DIAGNOSIS — Z96.611 STATUS POST REVERSE TOTAL ARTHROPLASTY OF RIGHT SHOULDER: Primary | ICD-10-CM

## 2020-09-02 DIAGNOSIS — S46.011A TRAUMATIC ROTATOR CUFF TEAR, RIGHT, INITIAL ENCOUNTER: ICD-10-CM

## 2020-09-02 DIAGNOSIS — E03.8 HYPOTHYROIDISM DUE TO HASHIMOTO'S THYROIDITIS: ICD-10-CM

## 2020-09-02 DIAGNOSIS — M05.79 RHEUMATOID ARTHRITIS INVOLVING MULTIPLE SITES WITH POSITIVE RHEUMATOID FACTOR (HCC): ICD-10-CM

## 2020-09-02 DIAGNOSIS — M19.011 GLENOHUMERAL ARTHRITIS, RIGHT: ICD-10-CM

## 2020-09-02 DIAGNOSIS — I45.10 RBBB: ICD-10-CM

## 2020-09-02 DIAGNOSIS — E06.3 HYPOTHYROIDISM DUE TO HASHIMOTO'S THYROIDITIS: ICD-10-CM

## 2020-09-02 PROCEDURE — C1713 ANCHOR/SCREW BN/BN,TIS/BN: HCPCS | Performed by: ORTHOPAEDIC SURGERY

## 2020-09-02 PROCEDURE — C1776 JOINT DEVICE (IMPLANTABLE): HCPCS | Performed by: ORTHOPAEDIC SURGERY

## 2020-09-02 PROCEDURE — 0RRJ00Z REPLACEMENT OF RIGHT SHOULDER JOINT WITH REVERSE BALL AND SOCKET SYNTHETIC SUBSTITUTE, OPEN APPROACH: ICD-10-PCS | Performed by: ORTHOPAEDIC SURGERY

## 2020-09-02 PROCEDURE — 23472 RECONSTRUCT SHOULDER JOINT: CPT | Performed by: ORTHOPAEDIC SURGERY

## 2020-09-02 PROCEDURE — 73030 X-RAY EXAM OF SHOULDER: CPT

## 2020-09-02 PROCEDURE — 99024 POSTOP FOLLOW-UP VISIT: CPT | Performed by: ORTHOPAEDIC SURGERY

## 2020-09-02 PROCEDURE — G9197 ORDER FOR CEPH: HCPCS | Performed by: ORTHOPAEDIC SURGERY

## 2020-09-02 PROCEDURE — C9290 INJ, BUPIVACAINE LIPOSOME: HCPCS | Performed by: STUDENT IN AN ORGANIZED HEALTH CARE EDUCATION/TRAINING PROGRAM

## 2020-09-02 DEVICE — SCREW CENTRAL 6 X 35MM: Type: IMPLANTABLE DEVICE | Site: SHOULDER | Status: FUNCTIONAL

## 2020-09-02 DEVICE — GLENOSPHERE 36MM LATERALIZED: Type: IMPLANTABLE DEVICE | Site: SHOULDER | Status: FUNCTIONAL

## 2020-09-02 DEVICE — SCREW PERIPHERAL 5 X 26MM: Type: IMPLANTABLE DEVICE | Site: SHOULDER | Status: FUNCTIONAL

## 2020-09-02 DEVICE — IMPLANTABLE DEVICE
Type: IMPLANTABLE DEVICE | Site: SHOULDER | Status: FUNCTIONAL
Brand: FLEX SHOULDER SYSTEM

## 2020-09-02 DEVICE — IMPLANTABLE DEVICE
Type: IMPLANTABLE DEVICE | Site: SHOULDER | Status: FUNCTIONAL
Brand: AEQUALIS™ ASCEND™ FLEX

## 2020-09-02 DEVICE — SCREW PERIPHERAL 5 X 18MM: Type: IMPLANTABLE DEVICE | Site: SHOULDER | Status: FUNCTIONAL

## 2020-09-02 DEVICE — BASEPLATE STD 25MM: Type: IMPLANTABLE DEVICE | Site: SHOULDER | Status: FUNCTIONAL

## 2020-09-02 RX ORDER — GABAPENTIN 300 MG/1
300 CAPSULE ORAL
Status: DISCONTINUED | OUTPATIENT
Start: 2020-09-02 | End: 2020-09-08 | Stop reason: HOSPADM

## 2020-09-02 RX ORDER — LIDOCAINE HYDROCHLORIDE 10 MG/ML
0.5 INJECTION, SOLUTION EPIDURAL; INFILTRATION; INTRACAUDAL; PERINEURAL ONCE AS NEEDED
Status: DISCONTINUED | OUTPATIENT
Start: 2020-09-02 | End: 2020-09-02 | Stop reason: HOSPADM

## 2020-09-02 RX ORDER — ASCORBIC ACID 500 MG
1000 TABLET ORAL DAILY
Status: DISCONTINUED | OUTPATIENT
Start: 2020-09-03 | End: 2020-09-08 | Stop reason: HOSPADM

## 2020-09-02 RX ORDER — LIDOCAINE HYDROCHLORIDE 20 MG/ML
INJECTION, SOLUTION INFILTRATION; PERINEURAL
Status: COMPLETED | OUTPATIENT
Start: 2020-09-02 | End: 2020-09-02

## 2020-09-02 RX ORDER — FENTANYL CITRATE 50 UG/ML
INJECTION, SOLUTION INTRAMUSCULAR; INTRAVENOUS AS NEEDED
Status: DISCONTINUED | OUTPATIENT
Start: 2020-09-02 | End: 2020-09-02

## 2020-09-02 RX ORDER — MIDAZOLAM HYDROCHLORIDE 2 MG/2ML
INJECTION, SOLUTION INTRAMUSCULAR; INTRAVENOUS AS NEEDED
Status: DISCONTINUED | OUTPATIENT
Start: 2020-09-02 | End: 2020-09-02

## 2020-09-02 RX ORDER — EPHEDRINE SULFATE 50 MG/ML
INJECTION INTRAVENOUS AS NEEDED
Status: DISCONTINUED | OUTPATIENT
Start: 2020-09-02 | End: 2020-09-02

## 2020-09-02 RX ORDER — FAMOTIDINE 20 MG/1
20 TABLET, FILM COATED ORAL EVERY 12 HOURS SCHEDULED
Status: DISCONTINUED | OUTPATIENT
Start: 2020-09-03 | End: 2020-09-08 | Stop reason: HOSPADM

## 2020-09-02 RX ORDER — CHLORHEXIDINE GLUCONATE 0.12 MG/ML
15 RINSE ORAL ONCE
Status: COMPLETED | OUTPATIENT
Start: 2020-09-02 | End: 2020-09-02

## 2020-09-02 RX ORDER — SODIUM CHLORIDE, SODIUM LACTATE, POTASSIUM CHLORIDE, CALCIUM CHLORIDE 600; 310; 30; 20 MG/100ML; MG/100ML; MG/100ML; MG/100ML
1.5 INJECTION, SOLUTION INTRAVENOUS CONTINUOUS
Status: DISCONTINUED | OUTPATIENT
Start: 2020-09-02 | End: 2020-09-08 | Stop reason: HOSPADM

## 2020-09-02 RX ORDER — NEOSTIGMINE METHYLSULFATE 1 MG/ML
INJECTION INTRAVENOUS AS NEEDED
Status: DISCONTINUED | OUTPATIENT
Start: 2020-09-02 | End: 2020-09-02

## 2020-09-02 RX ORDER — LIDOCAINE HYDROCHLORIDE 10 MG/ML
INJECTION, SOLUTION EPIDURAL; INFILTRATION; INTRACAUDAL; PERINEURAL AS NEEDED
Status: DISCONTINUED | OUTPATIENT
Start: 2020-09-02 | End: 2020-09-02

## 2020-09-02 RX ORDER — PREDNISONE 10 MG/1
10 TABLET ORAL DAILY
Status: DISCONTINUED | OUTPATIENT
Start: 2020-09-02 | End: 2020-09-08 | Stop reason: HOSPADM

## 2020-09-02 RX ORDER — SENNOSIDES 8.6 MG
1 TABLET ORAL DAILY
Status: DISCONTINUED | OUTPATIENT
Start: 2020-09-02 | End: 2020-09-08 | Stop reason: HOSPADM

## 2020-09-02 RX ORDER — PROPOFOL 10 MG/ML
INJECTION, EMULSION INTRAVENOUS AS NEEDED
Status: DISCONTINUED | OUTPATIENT
Start: 2020-09-02 | End: 2020-09-02

## 2020-09-02 RX ORDER — HYDROMORPHONE HCL/PF 1 MG/ML
0.5 SYRINGE (ML) INJECTION
Status: ACTIVE | OUTPATIENT
Start: 2020-09-02 | End: 2020-09-04

## 2020-09-02 RX ORDER — HYDROMORPHONE HCL/PF 1 MG/ML
SYRINGE (ML) INJECTION AS NEEDED
Status: DISCONTINUED | OUTPATIENT
Start: 2020-09-02 | End: 2020-09-02

## 2020-09-02 RX ORDER — ONDANSETRON 2 MG/ML
4 INJECTION INTRAMUSCULAR; INTRAVENOUS ONCE AS NEEDED
Status: DISCONTINUED | OUTPATIENT
Start: 2020-09-02 | End: 2020-09-02 | Stop reason: HOSPADM

## 2020-09-02 RX ORDER — SODIUM CHLORIDE, SODIUM LACTATE, POTASSIUM CHLORIDE, CALCIUM CHLORIDE 600; 310; 30; 20 MG/100ML; MG/100ML; MG/100ML; MG/100ML
125 INJECTION, SOLUTION INTRAVENOUS CONTINUOUS
Status: DISCONTINUED | OUTPATIENT
Start: 2020-09-02 | End: 2020-09-02 | Stop reason: SDUPTHER

## 2020-09-02 RX ORDER — BUPIVACAINE HYDROCHLORIDE 5 MG/ML
INJECTION, SOLUTION PERINEURAL
Status: COMPLETED | OUTPATIENT
Start: 2020-09-02 | End: 2020-09-02

## 2020-09-02 RX ORDER — CALCIUM CARBONATE 200(500)MG
1000 TABLET,CHEWABLE ORAL DAILY PRN
Status: DISCONTINUED | OUTPATIENT
Start: 2020-09-02 | End: 2020-09-08 | Stop reason: HOSPADM

## 2020-09-02 RX ORDER — DEXAMETHASONE SODIUM PHOSPHATE 10 MG/ML
INJECTION, SOLUTION INTRAMUSCULAR; INTRAVENOUS AS NEEDED
Status: DISCONTINUED | OUTPATIENT
Start: 2020-09-02 | End: 2020-09-02

## 2020-09-02 RX ORDER — ACETAMINOPHEN 325 MG/1
650 TABLET ORAL EVERY 6 HOURS PRN
Status: DISCONTINUED | OUTPATIENT
Start: 2020-09-02 | End: 2020-09-08 | Stop reason: HOSPADM

## 2020-09-02 RX ORDER — FENTANYL CITRATE/PF 50 MCG/ML
25 SYRINGE (ML) INJECTION
Status: DISCONTINUED | OUTPATIENT
Start: 2020-09-02 | End: 2020-09-02 | Stop reason: HOSPADM

## 2020-09-02 RX ORDER — ROCURONIUM BROMIDE 10 MG/ML
INJECTION, SOLUTION INTRAVENOUS AS NEEDED
Status: DISCONTINUED | OUTPATIENT
Start: 2020-09-02 | End: 2020-09-02

## 2020-09-02 RX ORDER — ONDANSETRON 2 MG/ML
4 INJECTION INTRAMUSCULAR; INTRAVENOUS EVERY 6 HOURS PRN
Status: DISCONTINUED | OUTPATIENT
Start: 2020-09-02 | End: 2020-09-08 | Stop reason: HOSPADM

## 2020-09-02 RX ORDER — GLYCOPYRROLATE 0.2 MG/ML
INJECTION INTRAMUSCULAR; INTRAVENOUS AS NEEDED
Status: DISCONTINUED | OUTPATIENT
Start: 2020-09-02 | End: 2020-09-02

## 2020-09-02 RX ORDER — NORTRIPTYLINE HYDROCHLORIDE 50 MG/1
50 CAPSULE ORAL
Status: DISCONTINUED | OUTPATIENT
Start: 2020-09-02 | End: 2020-09-08 | Stop reason: HOSPADM

## 2020-09-02 RX ORDER — OXYCODONE HYDROCHLORIDE 5 MG/1
2.5 TABLET ORAL EVERY 4 HOURS PRN
Status: DISCONTINUED | OUTPATIENT
Start: 2020-09-02 | End: 2020-09-08 | Stop reason: HOSPADM

## 2020-09-02 RX ORDER — OXYCODONE HYDROCHLORIDE 5 MG/1
TABLET ORAL
Qty: 15 TABLET | Refills: 0 | Status: SHIPPED | OUTPATIENT
Start: 2020-09-02 | End: 2020-09-08 | Stop reason: SDUPTHER

## 2020-09-02 RX ORDER — LORAZEPAM 0.5 MG/1
0.5 TABLET ORAL ONCE AS NEEDED
Status: COMPLETED | OUTPATIENT
Start: 2020-09-02 | End: 2020-09-02

## 2020-09-02 RX ORDER — HYDROMORPHONE HCL/PF 1 MG/ML
0.5 SYRINGE (ML) INJECTION
Status: DISCONTINUED | OUTPATIENT
Start: 2020-09-02 | End: 2020-09-02 | Stop reason: HOSPADM

## 2020-09-02 RX ORDER — FOLIC ACID 1 MG/1
1000 TABLET ORAL DAILY
Status: DISCONTINUED | OUTPATIENT
Start: 2020-09-02 | End: 2020-09-08 | Stop reason: HOSPADM

## 2020-09-02 RX ORDER — ONDANSETRON 2 MG/ML
INJECTION INTRAMUSCULAR; INTRAVENOUS AS NEEDED
Status: DISCONTINUED | OUTPATIENT
Start: 2020-09-02 | End: 2020-09-02

## 2020-09-02 RX ORDER — POLYETHYLENE GLYCOL 3350 17 G/17G
17 POWDER, FOR SOLUTION ORAL DAILY
Status: DISCONTINUED | OUTPATIENT
Start: 2020-09-03 | End: 2020-09-08 | Stop reason: HOSPADM

## 2020-09-02 RX ORDER — DOCUSATE SODIUM 100 MG/1
100 CAPSULE, LIQUID FILLED ORAL 2 TIMES DAILY
Status: DISCONTINUED | OUTPATIENT
Start: 2020-09-02 | End: 2020-09-08 | Stop reason: HOSPADM

## 2020-09-02 RX ORDER — SODIUM CHLORIDE, SODIUM LACTATE, POTASSIUM CHLORIDE, CALCIUM CHLORIDE 600; 310; 30; 20 MG/100ML; MG/100ML; MG/100ML; MG/100ML
100 INJECTION, SOLUTION INTRAVENOUS CONTINUOUS
Status: DISCONTINUED | OUTPATIENT
Start: 2020-09-02 | End: 2020-09-08 | Stop reason: HOSPADM

## 2020-09-02 RX ORDER — OXYCODONE HYDROCHLORIDE 5 MG/1
5 TABLET ORAL EVERY 4 HOURS PRN
Status: DISCONTINUED | OUTPATIENT
Start: 2020-09-02 | End: 2020-09-08 | Stop reason: HOSPADM

## 2020-09-02 RX ORDER — LEVOTHYROXINE SODIUM 0.03 MG/1
25 TABLET ORAL
Status: DISCONTINUED | OUTPATIENT
Start: 2020-09-03 | End: 2020-09-08 | Stop reason: HOSPADM

## 2020-09-02 RX ORDER — CEFAZOLIN SODIUM 2 G/50ML
2000 SOLUTION INTRAVENOUS ONCE
Status: COMPLETED | OUTPATIENT
Start: 2020-09-02 | End: 2020-09-02

## 2020-09-02 RX ORDER — CEFAZOLIN SODIUM 1 G/50ML
1000 SOLUTION INTRAVENOUS EVERY 8 HOURS
Status: COMPLETED | OUTPATIENT
Start: 2020-09-02 | End: 2020-09-03

## 2020-09-02 RX ADMIN — EPHEDRINE SULFATE 10 MG: 50 INJECTION, SOLUTION INTRAVENOUS at 11:57

## 2020-09-02 RX ADMIN — EPHEDRINE SULFATE 10 MG: 50 INJECTION, SOLUTION INTRAVENOUS at 12:06

## 2020-09-02 RX ADMIN — ROCURONIUM BROMIDE 20 MG: 10 INJECTION, SOLUTION INTRAVENOUS at 11:25

## 2020-09-02 RX ADMIN — SODIUM CHLORIDE, SODIUM LACTATE, POTASSIUM CHLORIDE, AND CALCIUM CHLORIDE 1.5 ML/KG/HR: .6; .31; .03; .02 INJECTION, SOLUTION INTRAVENOUS at 13:29

## 2020-09-02 RX ADMIN — ROCURONIUM BROMIDE 30 MG: 10 INJECTION, SOLUTION INTRAVENOUS at 11:13

## 2020-09-02 RX ADMIN — FOLIC ACID 1000 MCG: 1 TABLET ORAL at 14:30

## 2020-09-02 RX ADMIN — HYDROMORPHONE HYDROCHLORIDE 0.5 MG: 1 INJECTION, SOLUTION INTRAMUSCULAR; INTRAVENOUS; SUBCUTANEOUS at 12:25

## 2020-09-02 RX ADMIN — CEFAZOLIN SODIUM 2000 MG: 2 SOLUTION INTRAVENOUS at 11:20

## 2020-09-02 RX ADMIN — FENTANYL CITRATE 25 MCG: 50 INJECTION, SOLUTION INTRAMUSCULAR; INTRAVENOUS at 10:50

## 2020-09-02 RX ADMIN — GLYCOPYRROLATE 0.4 MG: 0.2 INJECTION, SOLUTION INTRAMUSCULAR; INTRAVENOUS at 12:23

## 2020-09-02 RX ADMIN — NORTRIPTYLINE HYDROCHLORIDE 50 MG: 50 CAPSULE ORAL at 21:11

## 2020-09-02 RX ADMIN — PHENYLEPHRINE HYDROCHLORIDE 20 MCG/MIN: 10 INJECTION INTRAVENOUS at 11:38

## 2020-09-02 RX ADMIN — LIDOCAINE HYDROCHLORIDE 50 MG: 10 INJECTION, SOLUTION EPIDURAL; INFILTRATION; INTRACAUDAL; PERINEURAL at 11:13

## 2020-09-02 RX ADMIN — FENTANYL CITRATE 25 MCG: 50 INJECTION, SOLUTION INTRAMUSCULAR; INTRAVENOUS at 10:53

## 2020-09-02 RX ADMIN — PREDNISONE 10 MG: 10 TABLET ORAL at 15:59

## 2020-09-02 RX ADMIN — EPHEDRINE SULFATE 10 MG: 50 INJECTION, SOLUTION INTRAVENOUS at 11:53

## 2020-09-02 RX ADMIN — NEOSTIGMINE METHYLSULFATE 2 MG: 1 INJECTION, SOLUTION INTRAVENOUS at 12:23

## 2020-09-02 RX ADMIN — LORAZEPAM 0.5 MG: 0.5 TABLET ORAL at 23:11

## 2020-09-02 RX ADMIN — ONDANSETRON 4 MG: 2 INJECTION INTRAMUSCULAR; INTRAVENOUS at 11:59

## 2020-09-02 RX ADMIN — SENNOSIDES 8.6 MG: 8.6 TABLET, FILM COATED ORAL at 14:30

## 2020-09-02 RX ADMIN — MIDAZOLAM 1 MG: 1 INJECTION INTRAMUSCULAR; INTRAVENOUS at 10:50

## 2020-09-02 RX ADMIN — SODIUM CHLORIDE, SODIUM LACTATE, POTASSIUM CHLORIDE, AND CALCIUM CHLORIDE: .6; .31; .03; .02 INJECTION, SOLUTION INTRAVENOUS at 10:45

## 2020-09-02 RX ADMIN — CEFAZOLIN SODIUM 1000 MG: 1 SOLUTION INTRAVENOUS at 19:44

## 2020-09-02 RX ADMIN — PHENYLEPHRINE HYDROCHLORIDE 200 MCG: 10 INJECTION INTRAVENOUS at 11:53

## 2020-09-02 RX ADMIN — DEXAMETHASONE SODIUM PHOSPHATE 4 MG: 10 INJECTION, SOLUTION INTRAMUSCULAR; INTRAVENOUS at 11:50

## 2020-09-02 RX ADMIN — DOCUSATE SODIUM 100 MG: 100 CAPSULE, LIQUID FILLED ORAL at 17:20

## 2020-09-02 RX ADMIN — BUPIVACAINE HYDROCHLORIDE 10 ML: 5 INJECTION, SOLUTION PERINEURAL at 10:55

## 2020-09-02 RX ADMIN — IRON SUCROSE 200 MG: 20 INJECTION, SOLUTION INTRAVENOUS at 15:02

## 2020-09-02 RX ADMIN — LIDOCAINE HYDROCHLORIDE 5 ML: 20 INJECTION, SOLUTION INFILTRATION; PERINEURAL at 10:55

## 2020-09-02 RX ADMIN — BUPIVACAINE 20 ML: 13.3 INJECTION, SUSPENSION, LIPOSOMAL INFILTRATION at 10:54

## 2020-09-02 RX ADMIN — FENTANYL CITRATE 50 MCG: 50 INJECTION, SOLUTION INTRAMUSCULAR; INTRAVENOUS at 11:42

## 2020-09-02 RX ADMIN — CHLORHEXIDINE GLUCONATE 0.12% ORAL RINSE 15 ML: 1.2 LIQUID ORAL at 09:57

## 2020-09-02 RX ADMIN — MIDAZOLAM 1 MG: 1 INJECTION INTRAMUSCULAR; INTRAVENOUS at 10:53

## 2020-09-02 RX ADMIN — PROPOFOL 150 MG: 10 INJECTION, EMULSION INTRAVENOUS at 11:13

## 2020-09-02 RX ADMIN — GABAPENTIN 300 MG: 300 CAPSULE ORAL at 21:11

## 2020-09-02 RX ADMIN — PHENYLEPHRINE HYDROCHLORIDE 200 MCG: 10 INJECTION INTRAVENOUS at 11:56

## 2020-09-02 NOTE — ANESTHESIA POSTPROCEDURE EVALUATION
Post-Op Assessment Note    CV Status:  Stable  Pain Score: 2    Pain management: adequate     Mental Status:  Alert and awake   Hydration Status:  Euvolemic   PONV Controlled:  Controlled   Airway Patency:  Patent      Post Op Vitals Reviewed: Yes      Staff: CRNA     Post-op block assessment: site cleaned and no complications      No complications documented      BP  138/59    Temp      Pulse 82   Resp 16   SpO2 100

## 2020-09-02 NOTE — ANESTHESIA PROCEDURE NOTES
Peripheral Block    Patient location during procedure: holding area  Start time: 9/2/2020 10:50 AM  Reason for block: at surgeon's request and post-op pain management  Staffing  Anesthesiologist: Lizzy Barrios MD  Performed: anesthesiologist   Preanesthetic Checklist  Completed: patient identified, site marked, surgical consent, pre-op evaluation, timeout performed, IV checked, risks and benefits discussed and monitors and equipment checked  Peripheral Block  Patient position: supine  Prep: ChloraPrep  Patient monitoring: continuous pulse ox, frequent blood pressure checks, heart rate and cardiac monitor  Block type: interscalene  Laterality: right  Injection technique: catheter  Procedures: ultrasound guided, Ultrasound guidance required for the procedure to increase accuracy and safety of medication placement and decrease risk of complications    Ultrasound permanent image savedbupivacaine (MARCAINE) 0 5 % perineural infiltration, 10 mL  lidocaine (XYLOCAINE) 2 % infiltration, 5 mL  Needle  Needle type: Stimuplex   Needle gauge: 22 G  Needle length: 10 cm  Needle localization: anatomical landmarks and ultrasound guidance  Needle insertion depth: 6 cm  Assessment  Injection assessment: incremental injection, local visualized surrounding nerve on ultrasound, negative aspiration for heme and transient paresthesias  Paresthesia pain: immediately resolved  Heart rate change: no  Slow fractionated injection: yes  Post-procedure:  site cleaned  patient tolerated the procedure well with no immediate complications

## 2020-09-02 NOTE — ANESTHESIA PREPROCEDURE EVALUATION
Procedure:  ARTHROPLASTY SHOULDER REVERSE (Right Shoulder)    Relevant Problems   CARDIO   (+) RBBB      ENDO   (+) Hypothyroidism      MUSCULOSKELETAL   (+) Glenohumeral arthritis, right   (+) Other forms of systemic lupus erythematosus (HCC)   (+) Rheumatoid arthritis involving multiple sites with positive rheumatoid factor (HCC)      NEURO/PSYCH   (+) Anxiety   (+) Chronic pain syndrome        Physical Exam    Airway    Mallampati score: II  TM Distance: >3 FB  Neck ROM: full     Dental   No notable dental hx     Cardiovascular      Pulmonary      Other Findings        Anesthesia Plan  ASA Score- 3     Anesthesia Type- general and regional with ASA Monitors  Additional Monitors:   Airway Plan: ETT  Plan Factors-Exercise tolerance (METS): >4 METS  Chart reviewed  EKG reviewed  Existing labs reviewed  Patient summary reviewed  Patient is not a current smoker  Obstructive sleep apnea risk education given perioperatively  Induction- intravenous  Postoperative Plan- Plan for postoperative opioid use  Informed Consent- Anesthetic plan and risks discussed with patient  I personally reviewed this patient with the CRNA  Discussed and agreed on the Anesthesia Plan with the CRNA  Jaycee Jay

## 2020-09-03 LAB
ANION GAP SERPL CALCULATED.3IONS-SCNC: 6 MMOL/L (ref 4–13)
BUN SERPL-MCNC: 10 MG/DL (ref 5–25)
CALCIUM SERPL-MCNC: 8.6 MG/DL (ref 8.3–10.1)
CHLORIDE SERPL-SCNC: 105 MMOL/L (ref 100–108)
CO2 SERPL-SCNC: 29 MMOL/L (ref 21–32)
CREAT SERPL-MCNC: 0.53 MG/DL (ref 0.6–1.3)
ERYTHROCYTE [DISTWIDTH] IN BLOOD BY AUTOMATED COUNT: 14.8 % (ref 11.6–15.1)
GFR SERPL CREATININE-BSD FRML MDRD: 93 ML/MIN/1.73SQ M
GLUCOSE SERPL-MCNC: 97 MG/DL (ref 65–140)
HCT VFR BLD AUTO: 28.2 % (ref 34.8–46.1)
HGB BLD-MCNC: 9 G/DL (ref 11.5–15.4)
MCH RBC QN AUTO: 30.1 PG (ref 26.8–34.3)
MCHC RBC AUTO-ENTMCNC: 31.9 G/DL (ref 31.4–37.4)
MCV RBC AUTO: 94 FL (ref 82–98)
PLATELET # BLD AUTO: 338 THOUSANDS/UL (ref 149–390)
PMV BLD AUTO: 8.6 FL (ref 8.9–12.7)
POTASSIUM SERPL-SCNC: 3 MMOL/L (ref 3.5–5.3)
RBC # BLD AUTO: 2.99 MILLION/UL (ref 3.81–5.12)
SODIUM SERPL-SCNC: 140 MMOL/L (ref 136–145)
WBC # BLD AUTO: 12.03 THOUSAND/UL (ref 4.31–10.16)

## 2020-09-03 PROCEDURE — 97163 PT EVAL HIGH COMPLEX 45 MIN: CPT

## 2020-09-03 PROCEDURE — 85027 COMPLETE CBC AUTOMATED: CPT | Performed by: ORTHOPAEDIC SURGERY

## 2020-09-03 PROCEDURE — NC001 PR NO CHARGE: Performed by: ORTHOPAEDIC SURGERY

## 2020-09-03 PROCEDURE — 97167 OT EVAL HIGH COMPLEX 60 MIN: CPT

## 2020-09-03 PROCEDURE — 80048 BASIC METABOLIC PNL TOTAL CA: CPT | Performed by: ORTHOPAEDIC SURGERY

## 2020-09-03 RX ORDER — POTASSIUM CHLORIDE 20 MEQ/1
40 TABLET, EXTENDED RELEASE ORAL 2 TIMES DAILY
Status: COMPLETED | OUTPATIENT
Start: 2020-09-03 | End: 2020-09-04

## 2020-09-03 RX ORDER — ALPRAZOLAM 0.25 MG/1
0.25 TABLET ORAL
Status: DISCONTINUED | OUTPATIENT
Start: 2020-09-03 | End: 2020-09-08 | Stop reason: HOSPADM

## 2020-09-03 RX ADMIN — CEFAZOLIN SODIUM 1000 MG: 1 SOLUTION INTRAVENOUS at 03:46

## 2020-09-03 RX ADMIN — ALPRAZOLAM 0.25 MG: 0.25 TABLET ORAL at 19:04

## 2020-09-03 RX ADMIN — NORTRIPTYLINE HYDROCHLORIDE 50 MG: 50 CAPSULE ORAL at 21:32

## 2020-09-03 RX ADMIN — DOCUSATE SODIUM 100 MG: 100 CAPSULE, LIQUID FILLED ORAL at 17:10

## 2020-09-03 RX ADMIN — LEVOTHYROXINE SODIUM 25 MCG: 25 TABLET ORAL at 05:47

## 2020-09-03 RX ADMIN — FOLIC ACID 1000 MCG: 1 TABLET ORAL at 08:29

## 2020-09-03 RX ADMIN — PREDNISONE 10 MG: 10 TABLET ORAL at 08:29

## 2020-09-03 RX ADMIN — OXYCODONE HYDROCHLORIDE 5 MG: 5 TABLET ORAL at 23:38

## 2020-09-03 RX ADMIN — IRON SUCROSE 200 MG: 20 INJECTION, SOLUTION INTRAVENOUS at 16:00

## 2020-09-03 RX ADMIN — DOCUSATE SODIUM 100 MG: 100 CAPSULE, LIQUID FILLED ORAL at 08:29

## 2020-09-03 RX ADMIN — FAMOTIDINE 20 MG: 20 TABLET ORAL at 11:22

## 2020-09-03 RX ADMIN — OXYCODONE HYDROCHLORIDE 5 MG: 5 TABLET ORAL at 18:03

## 2020-09-03 RX ADMIN — POLYETHYLENE GLYCOL 3350 17 G: 17 POWDER, FOR SOLUTION ORAL at 08:29

## 2020-09-03 RX ADMIN — POTASSIUM CHLORIDE 40 MEQ: 1500 TABLET, EXTENDED RELEASE ORAL at 08:29

## 2020-09-03 RX ADMIN — FAMOTIDINE 20 MG: 20 TABLET ORAL at 21:32

## 2020-09-03 RX ADMIN — SENNOSIDES 8.6 MG: 8.6 TABLET, FILM COATED ORAL at 08:29

## 2020-09-03 RX ADMIN — POTASSIUM CHLORIDE 40 MEQ: 1500 TABLET, EXTENDED RELEASE ORAL at 17:10

## 2020-09-03 RX ADMIN — GABAPENTIN 300 MG: 300 CAPSULE ORAL at 21:32

## 2020-09-03 RX ADMIN — OXYCODONE HYDROCHLORIDE 5 MG: 5 TABLET ORAL at 07:43

## 2020-09-03 RX ADMIN — OXYCODONE HYDROCHLORIDE AND ACETAMINOPHEN 1000 MG: 500 TABLET ORAL at 08:29

## 2020-09-04 PROBLEM — M19.011 GLENOHUMERAL ARTHRITIS, RIGHT: Status: RESOLVED | Noted: 2020-08-24 | Resolved: 2020-09-04

## 2020-09-04 PROBLEM — Z96.611 STATUS POST REVERSE TOTAL ARTHROPLASTY OF RIGHT SHOULDER: Status: ACTIVE | Noted: 2020-09-04

## 2020-09-04 LAB
ANION GAP SERPL CALCULATED.3IONS-SCNC: 5 MMOL/L (ref 4–13)
BUN SERPL-MCNC: 8 MG/DL (ref 5–25)
CALCIUM SERPL-MCNC: 8.8 MG/DL (ref 8.3–10.1)
CHLORIDE SERPL-SCNC: 106 MMOL/L (ref 100–108)
CO2 SERPL-SCNC: 30 MMOL/L (ref 21–32)
CREAT SERPL-MCNC: 0.42 MG/DL (ref 0.6–1.3)
GFR SERPL CREATININE-BSD FRML MDRD: 100 ML/MIN/1.73SQ M
GLUCOSE SERPL-MCNC: 88 MG/DL (ref 65–140)
POTASSIUM SERPL-SCNC: 3.7 MMOL/L (ref 3.5–5.3)
SODIUM SERPL-SCNC: 141 MMOL/L (ref 136–145)

## 2020-09-04 PROCEDURE — 97530 THERAPEUTIC ACTIVITIES: CPT

## 2020-09-04 PROCEDURE — NC001 PR NO CHARGE: Performed by: ORTHOPAEDIC SURGERY

## 2020-09-04 PROCEDURE — 97116 GAIT TRAINING THERAPY: CPT

## 2020-09-04 PROCEDURE — 97535 SELF CARE MNGMENT TRAINING: CPT

## 2020-09-04 PROCEDURE — 80048 BASIC METABOLIC PNL TOTAL CA: CPT | Performed by: NURSE PRACTITIONER

## 2020-09-04 RX ORDER — DOCUSATE SODIUM 100 MG/1
100 CAPSULE, LIQUID FILLED ORAL 2 TIMES DAILY
Qty: 10 CAPSULE | Refills: 0 | Status: SHIPPED | OUTPATIENT
Start: 2020-09-04 | End: 2021-05-20 | Stop reason: ALTCHOICE

## 2020-09-04 RX ADMIN — FOLIC ACID 1000 MCG: 1 TABLET ORAL at 08:13

## 2020-09-04 RX ADMIN — DOCUSATE SODIUM 100 MG: 100 CAPSULE, LIQUID FILLED ORAL at 17:14

## 2020-09-04 RX ADMIN — PREDNISONE 10 MG: 10 TABLET ORAL at 08:18

## 2020-09-04 RX ADMIN — OXYCODONE HYDROCHLORIDE 5 MG: 5 TABLET ORAL at 21:31

## 2020-09-04 RX ADMIN — OXYCODONE HYDROCHLORIDE 5 MG: 5 TABLET ORAL at 17:14

## 2020-09-04 RX ADMIN — POTASSIUM CHLORIDE 40 MEQ: 1500 TABLET, EXTENDED RELEASE ORAL at 08:13

## 2020-09-04 RX ADMIN — GABAPENTIN 300 MG: 300 CAPSULE ORAL at 21:31

## 2020-09-04 RX ADMIN — DOCUSATE SODIUM 100 MG: 100 CAPSULE, LIQUID FILLED ORAL at 08:13

## 2020-09-04 RX ADMIN — OXYCODONE HYDROCHLORIDE 5 MG: 5 TABLET ORAL at 13:09

## 2020-09-04 RX ADMIN — OXYCODONE HYDROCHLORIDE AND ACETAMINOPHEN 1000 MG: 500 TABLET ORAL at 08:13

## 2020-09-04 RX ADMIN — FAMOTIDINE 20 MG: 20 TABLET ORAL at 21:31

## 2020-09-04 RX ADMIN — NORTRIPTYLINE HYDROCHLORIDE 50 MG: 50 CAPSULE ORAL at 21:31

## 2020-09-04 RX ADMIN — FAMOTIDINE 20 MG: 20 TABLET ORAL at 08:13

## 2020-09-04 RX ADMIN — LEVOTHYROXINE SODIUM 25 MCG: 25 TABLET ORAL at 05:05

## 2020-09-04 RX ADMIN — OXYCODONE HYDROCHLORIDE 5 MG: 5 TABLET ORAL at 08:13

## 2020-09-05 PROCEDURE — NC001 PR NO CHARGE: Performed by: ORTHOPAEDIC SURGERY

## 2020-09-05 RX ORDER — HYDROXYCHLOROQUINE SULFATE 200 MG/1
100 TABLET, FILM COATED ORAL
Status: DISCONTINUED | OUTPATIENT
Start: 2020-09-06 | End: 2020-09-08 | Stop reason: HOSPADM

## 2020-09-05 RX ADMIN — OXYCODONE HYDROCHLORIDE 5 MG: 5 TABLET ORAL at 13:27

## 2020-09-05 RX ADMIN — LEVOTHYROXINE SODIUM 25 MCG: 25 TABLET ORAL at 05:20

## 2020-09-05 RX ADMIN — GABAPENTIN 300 MG: 300 CAPSULE ORAL at 21:01

## 2020-09-05 RX ADMIN — OXYCODONE HYDROCHLORIDE 5 MG: 5 TABLET ORAL at 21:39

## 2020-09-05 RX ADMIN — OXYCODONE HYDROCHLORIDE AND ACETAMINOPHEN 500 MG: 500 TABLET ORAL at 09:30

## 2020-09-05 RX ADMIN — ACETAMINOPHEN 650 MG: 325 TABLET, FILM COATED ORAL at 21:00

## 2020-09-05 RX ADMIN — SENNOSIDES 8.6 MG: 8.6 TABLET, FILM COATED ORAL at 09:30

## 2020-09-05 RX ADMIN — FOLIC ACID 1000 MCG: 1 TABLET ORAL at 09:30

## 2020-09-05 RX ADMIN — OXYCODONE HYDROCHLORIDE 5 MG: 5 TABLET ORAL at 17:32

## 2020-09-05 RX ADMIN — PREDNISONE 10 MG: 10 TABLET ORAL at 09:30

## 2020-09-05 RX ADMIN — OXYCODONE HYDROCHLORIDE 5 MG: 5 TABLET ORAL at 09:33

## 2020-09-05 RX ADMIN — OXYCODONE HYDROCHLORIDE 5 MG: 5 TABLET ORAL at 01:41

## 2020-09-05 RX ADMIN — FAMOTIDINE 20 MG: 20 TABLET ORAL at 09:30

## 2020-09-05 RX ADMIN — DOCUSATE SODIUM 100 MG: 100 CAPSULE, LIQUID FILLED ORAL at 09:30

## 2020-09-05 RX ADMIN — FAMOTIDINE 20 MG: 20 TABLET ORAL at 21:01

## 2020-09-05 RX ADMIN — DOCUSATE SODIUM 100 MG: 100 CAPSULE, LIQUID FILLED ORAL at 17:29

## 2020-09-05 RX ADMIN — NORTRIPTYLINE HYDROCHLORIDE 50 MG: 50 CAPSULE ORAL at 21:39

## 2020-09-05 RX ADMIN — ACETAMINOPHEN 650 MG: 325 TABLET, FILM COATED ORAL at 00:26

## 2020-09-06 LAB
ANION GAP SERPL CALCULATED.3IONS-SCNC: 6 MMOL/L (ref 4–13)
BASOPHILS # BLD AUTO: 0.02 THOUSANDS/ΜL (ref 0–0.1)
BASOPHILS NFR BLD AUTO: 0 % (ref 0–1)
BUN SERPL-MCNC: 14 MG/DL (ref 5–25)
CALCIUM SERPL-MCNC: 8.7 MG/DL (ref 8.3–10.1)
CHLORIDE SERPL-SCNC: 103 MMOL/L (ref 100–108)
CO2 SERPL-SCNC: 31 MMOL/L (ref 21–32)
CREAT SERPL-MCNC: 0.5 MG/DL (ref 0.6–1.3)
EOSINOPHIL # BLD AUTO: 0.05 THOUSAND/ΜL (ref 0–0.61)
EOSINOPHIL NFR BLD AUTO: 0 % (ref 0–6)
ERYTHROCYTE [DISTWIDTH] IN BLOOD BY AUTOMATED COUNT: 14.8 % (ref 11.6–15.1)
GFR SERPL CREATININE-BSD FRML MDRD: 94 ML/MIN/1.73SQ M
GLUCOSE SERPL-MCNC: 90 MG/DL (ref 65–140)
HCT VFR BLD AUTO: 28.2 % (ref 34.8–46.1)
HGB BLD-MCNC: 8.8 G/DL (ref 11.5–15.4)
IMM GRANULOCYTES # BLD AUTO: 0.24 THOUSAND/UL (ref 0–0.2)
IMM GRANULOCYTES NFR BLD AUTO: 2 % (ref 0–2)
LYMPHOCYTES # BLD AUTO: 0.64 THOUSANDS/ΜL (ref 0.6–4.47)
LYMPHOCYTES NFR BLD AUTO: 5 % (ref 14–44)
MCH RBC QN AUTO: 30 PG (ref 26.8–34.3)
MCHC RBC AUTO-ENTMCNC: 31.2 G/DL (ref 31.4–37.4)
MCV RBC AUTO: 96 FL (ref 82–98)
MONOCYTES # BLD AUTO: 1.06 THOUSAND/ΜL (ref 0.17–1.22)
MONOCYTES NFR BLD AUTO: 9 % (ref 4–12)
NEUTROPHILS # BLD AUTO: 9.93 THOUSANDS/ΜL (ref 1.85–7.62)
NEUTS SEG NFR BLD AUTO: 84 % (ref 43–75)
NRBC BLD AUTO-RTO: 0 /100 WBCS
PLATELET # BLD AUTO: 407 THOUSANDS/UL (ref 149–390)
PMV BLD AUTO: 8.3 FL (ref 8.9–12.7)
POTASSIUM SERPL-SCNC: 3.4 MMOL/L (ref 3.5–5.3)
RBC # BLD AUTO: 2.93 MILLION/UL (ref 3.81–5.12)
SODIUM SERPL-SCNC: 140 MMOL/L (ref 136–145)
WBC # BLD AUTO: 11.94 THOUSAND/UL (ref 4.31–10.16)

## 2020-09-06 PROCEDURE — 85025 COMPLETE CBC W/AUTO DIFF WBC: CPT | Performed by: ORTHOPAEDIC SURGERY

## 2020-09-06 PROCEDURE — 80048 BASIC METABOLIC PNL TOTAL CA: CPT | Performed by: ORTHOPAEDIC SURGERY

## 2020-09-06 PROCEDURE — NC001 PR NO CHARGE: Performed by: ORTHOPAEDIC SURGERY

## 2020-09-06 RX ORDER — ACETAMINOPHEN 325 MG/1
975 TABLET ORAL EVERY 8 HOURS SCHEDULED
Status: DISCONTINUED | OUTPATIENT
Start: 2020-09-06 | End: 2020-09-08 | Stop reason: HOSPADM

## 2020-09-06 RX ORDER — OXYCODONE HYDROCHLORIDE 10 MG/1
10 TABLET ORAL EVERY 4 HOURS PRN
Status: DISCONTINUED | OUTPATIENT
Start: 2020-09-06 | End: 2020-09-08 | Stop reason: HOSPADM

## 2020-09-06 RX ADMIN — HYDROXYCHLOROQUINE SULFATE 100 MG: 200 TABLET, FILM COATED ORAL at 07:49

## 2020-09-06 RX ADMIN — ALPRAZOLAM 0.25 MG: 0.25 TABLET ORAL at 21:29

## 2020-09-06 RX ADMIN — OXYCODONE HYDROCHLORIDE 10 MG: 10 TABLET ORAL at 17:35

## 2020-09-06 RX ADMIN — PREDNISONE 10 MG: 10 TABLET ORAL at 08:18

## 2020-09-06 RX ADMIN — GABAPENTIN 300 MG: 300 CAPSULE ORAL at 21:29

## 2020-09-06 RX ADMIN — NORTRIPTYLINE HYDROCHLORIDE 50 MG: 50 CAPSULE ORAL at 21:30

## 2020-09-06 RX ADMIN — FAMOTIDINE 20 MG: 20 TABLET ORAL at 08:18

## 2020-09-06 RX ADMIN — FAMOTIDINE 20 MG: 20 TABLET ORAL at 21:29

## 2020-09-06 RX ADMIN — SENNOSIDES 8.6 MG: 8.6 TABLET, FILM COATED ORAL at 08:18

## 2020-09-06 RX ADMIN — ACETAMINOPHEN 975 MG: 325 TABLET, FILM COATED ORAL at 13:31

## 2020-09-06 RX ADMIN — ACETAMINOPHEN 975 MG: 325 TABLET, FILM COATED ORAL at 21:29

## 2020-09-06 RX ADMIN — OXYCODONE HYDROCHLORIDE 5 MG: 5 TABLET ORAL at 07:50

## 2020-09-06 RX ADMIN — FOLIC ACID 1000 MCG: 1 TABLET ORAL at 08:18

## 2020-09-06 RX ADMIN — ALPRAZOLAM 0.25 MG: 0.25 TABLET ORAL at 01:12

## 2020-09-06 RX ADMIN — OXYCODONE HYDROCHLORIDE AND ACETAMINOPHEN 1000 MG: 500 TABLET ORAL at 08:18

## 2020-09-06 RX ADMIN — POLYETHYLENE GLYCOL 3350 17 G: 17 POWDER, FOR SOLUTION ORAL at 08:18

## 2020-09-06 RX ADMIN — LEVOTHYROXINE SODIUM 25 MCG: 25 TABLET ORAL at 05:43

## 2020-09-06 RX ADMIN — OXYCODONE HYDROCHLORIDE 5 MG: 5 TABLET ORAL at 02:31

## 2020-09-06 RX ADMIN — OXYCODONE HYDROCHLORIDE 5 MG: 5 TABLET ORAL at 12:12

## 2020-09-06 RX ADMIN — DOCUSATE SODIUM 100 MG: 100 CAPSULE, LIQUID FILLED ORAL at 17:16

## 2020-09-06 RX ADMIN — OXYCODONE HYDROCHLORIDE 5 MG: 5 TABLET ORAL at 16:30

## 2020-09-06 RX ADMIN — DOCUSATE SODIUM 100 MG: 100 CAPSULE, LIQUID FILLED ORAL at 08:18

## 2020-09-07 LAB
ANION GAP SERPL CALCULATED.3IONS-SCNC: 6 MMOL/L (ref 4–13)
BASOPHILS # BLD AUTO: 0.03 THOUSANDS/ΜL (ref 0–0.1)
BASOPHILS NFR BLD AUTO: 0 % (ref 0–1)
BUN SERPL-MCNC: 16 MG/DL (ref 5–25)
CALCIUM SERPL-MCNC: 8.8 MG/DL (ref 8.3–10.1)
CHLORIDE SERPL-SCNC: 103 MMOL/L (ref 100–108)
CO2 SERPL-SCNC: 30 MMOL/L (ref 21–32)
CREAT SERPL-MCNC: 0.57 MG/DL (ref 0.6–1.3)
EOSINOPHIL # BLD AUTO: 0.08 THOUSAND/ΜL (ref 0–0.61)
EOSINOPHIL NFR BLD AUTO: 1 % (ref 0–6)
ERYTHROCYTE [DISTWIDTH] IN BLOOD BY AUTOMATED COUNT: 14.7 % (ref 11.6–15.1)
GFR SERPL CREATININE-BSD FRML MDRD: 90 ML/MIN/1.73SQ M
GLUCOSE SERPL-MCNC: 89 MG/DL (ref 65–140)
HCT VFR BLD AUTO: 32.9 % (ref 34.8–46.1)
HGB BLD-MCNC: 9.8 G/DL (ref 11.5–15.4)
IMM GRANULOCYTES # BLD AUTO: 0.17 THOUSAND/UL (ref 0–0.2)
IMM GRANULOCYTES NFR BLD AUTO: 2 % (ref 0–2)
LYMPHOCYTES # BLD AUTO: 1.11 THOUSANDS/ΜL (ref 0.6–4.47)
LYMPHOCYTES NFR BLD AUTO: 11 % (ref 14–44)
MCH RBC QN AUTO: 29.2 PG (ref 26.8–34.3)
MCHC RBC AUTO-ENTMCNC: 29.8 G/DL (ref 31.4–37.4)
MCV RBC AUTO: 98 FL (ref 82–98)
MONOCYTES # BLD AUTO: 1.1 THOUSAND/ΜL (ref 0.17–1.22)
MONOCYTES NFR BLD AUTO: 11 % (ref 4–12)
NEUTROPHILS # BLD AUTO: 7.41 THOUSANDS/ΜL (ref 1.85–7.62)
NEUTS SEG NFR BLD AUTO: 75 % (ref 43–75)
NRBC BLD AUTO-RTO: 0 /100 WBCS
PLATELET # BLD AUTO: 523 THOUSANDS/UL (ref 149–390)
PMV BLD AUTO: 8.4 FL (ref 8.9–12.7)
POTASSIUM SERPL-SCNC: 3 MMOL/L (ref 3.5–5.3)
RBC # BLD AUTO: 3.36 MILLION/UL (ref 3.81–5.12)
SODIUM SERPL-SCNC: 139 MMOL/L (ref 136–145)
WBC # BLD AUTO: 9.9 THOUSAND/UL (ref 4.31–10.16)

## 2020-09-07 PROCEDURE — 85025 COMPLETE CBC W/AUTO DIFF WBC: CPT | Performed by: ORTHOPAEDIC SURGERY

## 2020-09-07 PROCEDURE — 80048 BASIC METABOLIC PNL TOTAL CA: CPT | Performed by: ORTHOPAEDIC SURGERY

## 2020-09-07 PROCEDURE — 97116 GAIT TRAINING THERAPY: CPT

## 2020-09-07 PROCEDURE — NC001 PR NO CHARGE: Performed by: ORTHOPAEDIC SURGERY

## 2020-09-07 RX ORDER — POTASSIUM CHLORIDE 750 MG/1
10 TABLET, EXTENDED RELEASE ORAL DAILY
Status: DISCONTINUED | OUTPATIENT
Start: 2020-09-08 | End: 2020-09-08 | Stop reason: HOSPADM

## 2020-09-07 RX ORDER — SODIUM CHLORIDE 450 MG/100ML
100 INJECTION, SOLUTION INTRAVENOUS ONCE
Status: COMPLETED | OUTPATIENT
Start: 2020-09-07 | End: 2020-09-07

## 2020-09-07 RX ORDER — POTASSIUM CHLORIDE 20 MEQ/1
40 TABLET, EXTENDED RELEASE ORAL ONCE
Status: COMPLETED | OUTPATIENT
Start: 2020-09-07 | End: 2020-09-07

## 2020-09-07 RX ADMIN — OXYCODONE HYDROCHLORIDE AND ACETAMINOPHEN 1000 MG: 500 TABLET ORAL at 08:06

## 2020-09-07 RX ADMIN — SODIUM CHLORIDE 100 ML/HR: 0.45 INJECTION, SOLUTION INTRAVENOUS at 14:24

## 2020-09-07 RX ADMIN — ACETAMINOPHEN 975 MG: 325 TABLET, FILM COATED ORAL at 14:24

## 2020-09-07 RX ADMIN — FAMOTIDINE 20 MG: 20 TABLET ORAL at 08:06

## 2020-09-07 RX ADMIN — LEVOTHYROXINE SODIUM 25 MCG: 25 TABLET ORAL at 05:39

## 2020-09-07 RX ADMIN — NORTRIPTYLINE HYDROCHLORIDE 50 MG: 50 CAPSULE ORAL at 20:34

## 2020-09-07 RX ADMIN — SENNOSIDES 8.6 MG: 8.6 TABLET, FILM COATED ORAL at 08:06

## 2020-09-07 RX ADMIN — POTASSIUM CHLORIDE 40 MEQ: 1500 TABLET, EXTENDED RELEASE ORAL at 11:13

## 2020-09-07 RX ADMIN — DOCUSATE SODIUM 100 MG: 100 CAPSULE, LIQUID FILLED ORAL at 16:23

## 2020-09-07 RX ADMIN — ACETAMINOPHEN 975 MG: 325 TABLET, FILM COATED ORAL at 05:40

## 2020-09-07 RX ADMIN — HYDROXYCHLOROQUINE SULFATE 100 MG: 200 TABLET, FILM COATED ORAL at 08:07

## 2020-09-07 RX ADMIN — PREDNISONE 10 MG: 10 TABLET ORAL at 08:06

## 2020-09-07 RX ADMIN — FAMOTIDINE 20 MG: 20 TABLET ORAL at 20:31

## 2020-09-07 RX ADMIN — OXYCODONE HYDROCHLORIDE 10 MG: 10 TABLET ORAL at 16:23

## 2020-09-07 RX ADMIN — OXYCODONE HYDROCHLORIDE 10 MG: 10 TABLET ORAL at 06:43

## 2020-09-07 RX ADMIN — DOCUSATE SODIUM 100 MG: 100 CAPSULE, LIQUID FILLED ORAL at 08:06

## 2020-09-07 RX ADMIN — OXYCODONE HYDROCHLORIDE 10 MG: 10 TABLET ORAL at 20:32

## 2020-09-07 RX ADMIN — GABAPENTIN 300 MG: 300 CAPSULE ORAL at 20:31

## 2020-09-07 RX ADMIN — ACETAMINOPHEN 975 MG: 325 TABLET, FILM COATED ORAL at 20:32

## 2020-09-07 RX ADMIN — ALPRAZOLAM 0.25 MG: 0.25 TABLET ORAL at 20:31

## 2020-09-07 RX ADMIN — FOLIC ACID 1000 MCG: 1 TABLET ORAL at 08:06

## 2020-09-07 RX ADMIN — OXYCODONE HYDROCHLORIDE 5 MG: 5 TABLET ORAL at 12:39

## 2020-09-08 ENCOUNTER — TRANSITIONAL CARE MANAGEMENT (OUTPATIENT)
Dept: INTERNAL MEDICINE CLINIC | Facility: CLINIC | Age: 75
End: 2020-09-08

## 2020-09-08 VITALS
OXYGEN SATURATION: 99 % | TEMPERATURE: 97.8 F | HEART RATE: 73 BPM | RESPIRATION RATE: 18 BRPM | BODY MASS INDEX: 20.36 KG/M2 | SYSTOLIC BLOOD PRESSURE: 110 MMHG | WEIGHT: 97 LBS | HEIGHT: 58 IN | DIASTOLIC BLOOD PRESSURE: 61 MMHG

## 2020-09-08 LAB
ANION GAP SERPL CALCULATED.3IONS-SCNC: 5 MMOL/L (ref 4–13)
BASOPHILS # BLD AUTO: 0.02 THOUSANDS/ΜL (ref 0–0.1)
BASOPHILS NFR BLD AUTO: 0 % (ref 0–1)
BUN SERPL-MCNC: 12 MG/DL (ref 5–25)
CALCIUM SERPL-MCNC: 8.7 MG/DL (ref 8.3–10.1)
CHLORIDE SERPL-SCNC: 106 MMOL/L (ref 100–108)
CO2 SERPL-SCNC: 29 MMOL/L (ref 21–32)
CREAT SERPL-MCNC: 0.39 MG/DL (ref 0.6–1.3)
EOSINOPHIL # BLD AUTO: 0.17 THOUSAND/ΜL (ref 0–0.61)
EOSINOPHIL NFR BLD AUTO: 2 % (ref 0–6)
ERYTHROCYTE [DISTWIDTH] IN BLOOD BY AUTOMATED COUNT: 14.6 % (ref 11.6–15.1)
GFR SERPL CREATININE-BSD FRML MDRD: 102 ML/MIN/1.73SQ M
GLUCOSE SERPL-MCNC: 79 MG/DL (ref 65–140)
HCT VFR BLD AUTO: 27.7 % (ref 34.8–46.1)
HGB BLD-MCNC: 8.5 G/DL (ref 11.5–15.4)
IMM GRANULOCYTES # BLD AUTO: 0.12 THOUSAND/UL (ref 0–0.2)
IMM GRANULOCYTES NFR BLD AUTO: 2 % (ref 0–2)
LYMPHOCYTES # BLD AUTO: 1.51 THOUSANDS/ΜL (ref 0.6–4.47)
LYMPHOCYTES NFR BLD AUTO: 19 % (ref 14–44)
MCH RBC QN AUTO: 29.5 PG (ref 26.8–34.3)
MCHC RBC AUTO-ENTMCNC: 30.7 G/DL (ref 31.4–37.4)
MCV RBC AUTO: 96 FL (ref 82–98)
MONOCYTES # BLD AUTO: 1.2 THOUSAND/ΜL (ref 0.17–1.22)
MONOCYTES NFR BLD AUTO: 15 % (ref 4–12)
NEUTROPHILS # BLD AUTO: 5.16 THOUSANDS/ΜL (ref 1.85–7.62)
NEUTS SEG NFR BLD AUTO: 62 % (ref 43–75)
NRBC BLD AUTO-RTO: 0 /100 WBCS
PLATELET # BLD AUTO: 423 THOUSANDS/UL (ref 149–390)
PMV BLD AUTO: 8.4 FL (ref 8.9–12.7)
POTASSIUM SERPL-SCNC: 3.6 MMOL/L (ref 3.5–5.3)
RBC # BLD AUTO: 2.88 MILLION/UL (ref 3.81–5.12)
SARS-COV-2 RNA RESP QL NAA+PROBE: NEGATIVE
SODIUM SERPL-SCNC: 140 MMOL/L (ref 136–145)
WBC # BLD AUTO: 8.18 THOUSAND/UL (ref 4.31–10.16)

## 2020-09-08 PROCEDURE — 99024 POSTOP FOLLOW-UP VISIT: CPT | Performed by: PHYSICIAN ASSISTANT

## 2020-09-08 PROCEDURE — 80048 BASIC METABOLIC PNL TOTAL CA: CPT | Performed by: ORTHOPAEDIC SURGERY

## 2020-09-08 PROCEDURE — 85025 COMPLETE CBC W/AUTO DIFF WBC: CPT | Performed by: ORTHOPAEDIC SURGERY

## 2020-09-08 PROCEDURE — NC001 PR NO CHARGE: Performed by: ORTHOPAEDIC SURGERY

## 2020-09-08 PROCEDURE — U0003 INFECTIOUS AGENT DETECTION BY NUCLEIC ACID (DNA OR RNA); SEVERE ACUTE RESPIRATORY SYNDROME CORONAVIRUS 2 (SARS-COV-2) (CORONAVIRUS DISEASE [COVID-19]), AMPLIFIED PROBE TECHNIQUE, MAKING USE OF HIGH THROUGHPUT TECHNOLOGIES AS DESCRIBED BY CMS-2020-01-R: HCPCS | Performed by: ORTHOPAEDIC SURGERY

## 2020-09-08 PROCEDURE — TCMNV: Performed by: INTERNAL MEDICINE

## 2020-09-08 RX ORDER — OXYCODONE HYDROCHLORIDE 5 MG/1
TABLET ORAL
Qty: 8 TABLET | Refills: 0 | Status: SHIPPED | OUTPATIENT
Start: 2020-09-08 | End: 2020-09-21 | Stop reason: SDUPTHER

## 2020-09-08 RX ADMIN — OXYCODONE HYDROCHLORIDE 5 MG: 5 TABLET ORAL at 10:02

## 2020-09-08 RX ADMIN — POTASSIUM CHLORIDE 10 MEQ: 750 TABLET, EXTENDED RELEASE ORAL at 08:18

## 2020-09-08 RX ADMIN — DOCUSATE SODIUM 100 MG: 100 CAPSULE, LIQUID FILLED ORAL at 08:18

## 2020-09-08 RX ADMIN — LEVOTHYROXINE SODIUM 25 MCG: 25 TABLET ORAL at 06:28

## 2020-09-08 RX ADMIN — ACETAMINOPHEN 975 MG: 325 TABLET, FILM COATED ORAL at 06:29

## 2020-09-08 RX ADMIN — OXYCODONE HYDROCHLORIDE AND ACETAMINOPHEN 1000 MG: 500 TABLET ORAL at 08:18

## 2020-09-08 RX ADMIN — PREDNISONE 10 MG: 10 TABLET ORAL at 08:17

## 2020-09-08 RX ADMIN — HYDROXYCHLOROQUINE SULFATE 100 MG: 200 TABLET, FILM COATED ORAL at 07:06

## 2020-09-08 RX ADMIN — SENNOSIDES 8.6 MG: 8.6 TABLET, FILM COATED ORAL at 08:18

## 2020-09-08 RX ADMIN — FOLIC ACID 1000 MCG: 1 TABLET ORAL at 08:17

## 2020-09-08 RX ADMIN — FAMOTIDINE 20 MG: 20 TABLET ORAL at 08:17

## 2020-09-08 RX ADMIN — OXYCODONE HYDROCHLORIDE 10 MG: 10 TABLET ORAL at 03:59

## 2020-09-08 RX ADMIN — POLYETHYLENE GLYCOL 3350 17 G: 17 POWDER, FOR SOLUTION ORAL at 08:17

## 2020-09-09 ENCOUNTER — TELEPHONE (OUTPATIENT)
Dept: OTHER | Facility: OTHER | Age: 75
End: 2020-09-09

## 2020-09-09 ENCOUNTER — NURSING HOME VISIT (OUTPATIENT)
Dept: GERIATRICS | Facility: OTHER | Age: 75
End: 2020-09-09
Payer: MEDICARE

## 2020-09-09 VITALS
OXYGEN SATURATION: 99 % | RESPIRATION RATE: 17 BRPM | TEMPERATURE: 99.8 F | HEART RATE: 92 BPM | SYSTOLIC BLOOD PRESSURE: 105 MMHG | DIASTOLIC BLOOD PRESSURE: 58 MMHG

## 2020-09-09 DIAGNOSIS — D62 ACUTE BLOOD LOSS ANEMIA: Primary | ICD-10-CM

## 2020-09-09 DIAGNOSIS — M05.79 RHEUMATOID ARTHRITIS INVOLVING MULTIPLE SITES WITH POSITIVE RHEUMATOID FACTOR (HCC): ICD-10-CM

## 2020-09-09 DIAGNOSIS — Z71.89 GOALS OF CARE, COUNSELING/DISCUSSION: ICD-10-CM

## 2020-09-09 DIAGNOSIS — R10.13 DYSPEPSIA: ICD-10-CM

## 2020-09-09 PROBLEM — Z47.1 AFTERCARE FOLLOWING JOINT REPLACEMENT: Status: ACTIVE | Noted: 2020-09-09

## 2020-09-09 PROBLEM — F32.A DEPRESSION: Status: ACTIVE | Noted: 2020-09-09

## 2020-09-09 PROBLEM — Z47.1 AFTERCARE FOLLOWING JOINT REPLACEMENT: Chronic | Status: ACTIVE | Noted: 2020-09-09

## 2020-09-09 PROCEDURE — 99305 1ST NF CARE MODERATE MDM 35: CPT | Performed by: INTERNAL MEDICINE

## 2020-09-09 NOTE — PROGRESS NOTES
[address] [place] [address] History and Physical 31    NAME: Marlin Rivera  AGE: 67 y o  SEX: female [id number]    DATE OF ENCOUNTER: 9/9/2020    Assessment and Plan     Problem List Items Addressed This Visit        Musculoskeletal and Integument    Rheumatoid arthritis involving multiple sites with positive rheumatoid factor ([address] )     Apparently at hospital she had no idea what her med regimen was  Is on methotrexate, plaquenil 100mg daily, and prednisone 10mg daily  Other    Dyspepsia     Patient currently on nortriptyline 50mg daily  Acute blood loss anemia - Primary     Operative acute blood loss anemia  Patient's hemoglobin yesterday was 8 5 hematocrit 27 7  Blood work performed today was 9 2 hematocrit 28 0  Will continue to monitor daily  She also received IV Venofer  Goals of care, counseling/discussion     1 - Matters most:  Wants to be a full code, including all life-saving measures  She wishes to return to her home environment after rehabilitation and continue her baseline activities  She plans to live on the first floor of her home while she recovers  2 - Mobility:  Prior to shoulder injury she used a walker with front wheels  She had a walker on every floor  She denies history of recent falls  She takes her walker out to the store  She has been using her walker for approximately 5-6 years  3 - Memory:  Some short-term memory issues based off of mini-mental status exam       4 - Medications: Methotrexate will need to be monitored closely  Monitor LFTs and PFTs  Prednisone long-term can promote osteoporosis, cataracts, and high blood sugar  All medications and routine orders were reviewed and updated as needed  Plan discussed with: Patient    Chief Complaint     No chief complaint on file         History of Present Illness     Patient is a 67Year old  female who recently underwent right reverse total shoulder arthroplasty  She comes to this facility for rehabilitation  Other commorbidities include history of chronic pain syndrome, hypothyroidism, RA Lupus, anemia  Regarding her shoulder she was at a grocery store using the bathroom and pinned her shoulder while the door was closing to the bathroom  After a few days of constant pain she went to her PCP who referred her to orthopedics  Patient is right hand dominant  She feels that her pain is adequately controlled currently  She was diagnosed with RA Lupus approximately one year ago and has been seeing a rheumatologist   She feels that her RA is controlled well  She takes milk of magnesia for her constipation and metamucil  She lives with her sister in a two floor home  She says she plans on staying on the first floor when she goes home  She was , is currently , and has one child, a 64 YOM alive and well  She stopped smoking 30 years ago  She denies alcohol use other than occasional social drinking in the past   She requests full code status              HISTORY:  Past Surgical History:   Procedure Laterality Date    APPENDECTOMY      HIP ARTHROPLASTY Left 11/27/2017    Procedure: REMOVAL IM NAIL CONVERSION TO TOTAL HIP ARTHROPLASTY POSTERIOR APPROACH;  Surgeon: Lily Vega MD;  [facility];  Service: Orthopedics    HIP FRACTURE SURGERY      HIP SURGERY      both hips replaced;2013 left ,2014 right    JOINT REPLACEMENT Right     HIP TOTAL    NC CONV PREV HIP SURG TO TOT HIP Sara Ripa Left 10/4/2017    Procedure: Eveline Amelia removal of left hip;  Surgeon: Lily Vega MD;  [facility];  Service: Orthopedics    NC RECONSTR TOTAL SHOULDER IMPLANT Right 9/2/2020    Procedure: ARTHROPLASTY SHOULDER REVERSE;  Surgeon: Dieudonne Ovalle MD;  [facility];  Service: Orthopedics    REVISION TOTAL HIP ARTHROPLASTY Right     TONSILLECTOMY        Past Medical History:   Diagnosis Date    Anxiety     Depression     Disease of thyroid gland     HYPO    Femur neck fracture (HCC)     GERD (gastroesophageal reflux disease)     Hyperthyroidism     RESOLVED: [id number]    Osteoporosis     Polio     PVD (peripheral vascular disease) (HCC)     Right BBB/left ant fasc block     UTI (urinary tract infection)     Varicella infection     LAST ASSESSED: [id number]     Family History   Problem Relation Age of Onset    Rheum arthritis Mother     Rheum arthritis Sister     Prostate cancer Brother      Social History     Socioeconomic History    Marital status:      Spouse name: Not on file    Number of children: 1    Years of education: Not on file    Highest education level: Not on file   Occupational History    Occupation: RETIRED    Social Needs    Financial resource strain: Not hard at all   Citizen[employer] insecurity     Worry: Never true     Inability: Never true    Transportation needs     Medical: No     Non-medical: No   Tobacco Use    Smoking status: Former Smoker    Smokeless tobacco: Never Used    Tobacco comment: quit 20-30 years ago   Substance and Sexual Activity    Alcohol use: Not Currently     Frequency: Never    Drug use: Not Currently    Sexual activity: Not Currently   Lifestyle    Physical activity     Days per week: 0 days     Minutes per session: 0 min    Stress: Not at all   Relationships    Social connections     Talks on phone:  Three times a week     Gets together: Twice a week     Attends Spiritism service: Never     Active member of club or organization: No     Attends meetings of clubs or organizations: Never     Relationship status:     Intimate partner violence     Fear of current or ex partner: No     Emotionally abused: No     Physically abused: No     Forced sexual activity: No   Other Topics Concern    Not on file   Social History Narrative    Always uses seat belt    Caffeine use    Bahai    Single as per all scripts        Allergies:  No Known Allergies    Review of Systems     Review of Systems   Constitutional: Negative  HENT: Negative  Eyes: Negative  Respiratory: Negative  Cardiovascular: Negative  Gastrointestinal: Positive for constipation  Endocrine: Negative  Genitourinary: Negative  Musculoskeletal: Negative  Skin: Negative  Allergic/Immunologic: Negative  Neurological: Negative  Hematological: Negative  Psychiatric/Behavioral: Negative          PHQ-9 Depression Screening    PHQ-9:    Frequency of the following problems over the past two weeks:              Medications and orders       Current Outpatient Medications:     acetaminophen (TYLENOL) 325 mg tablet, Take prn for mild pain, Disp: 30 tablet, Rfl: 0    Ascorbic Acid (VITAMIN C) 1000 MG tablet, Take 1,000 mg by mouth daily, Disp: , Rfl:     cholecalciferol (VITAMIN D3) 1,000 units tablet, Take 1,000 Units by mouth daily, Disp: , Rfl:     docusate sodium (COLACE) 100 mg capsule, Take 1 capsule (100 mg total) by mouth 2 (two) times a day, Disp: 10 capsule, Rfl: 0    famotidine (PEPCID) 20 mg tablet, Take 1 tablet (20 mg total) by mouth 2 (two) times a day, Disp: 90 tablet, Rfl: 1    folic acid (FOLVITE) 1 mg tablet, TAKE ONE TABLET BY MOUTH EVERY DAY, Disp: 30 tablet, Rfl: 2    gabapentin (NEURONTIN) 300 mg capsule, TAKE ONE CAPSULE BY MOUTH THREE TIMES A DAY, Disp: 270 capsule, Rfl: 1    hydroxychloroquine (PLAQUENIL) 200 mg tablet, 1 pill am 1/2 pill pm, Disp: , Rfl:     levothyroxine 25 mcg tablet, Take 1 tablet (25 mcg total) by mouth daily, Disp: 90 tablet, Rfl: 1    methotrexate 2 5 MG tablet, 3 tablets by mouth once a week on Thursday, Disp:  , Rfl:     Multiple Vitamins-Minerals (CENTRUM SILVER PO), Take 1 tablet by mouth daily, Disp: , Rfl:     nortriptyline (PAMELOR) 50 mg capsule, TAKE ONE CAPSULE BY MOUTH AT BEDTIME, Disp: 90 capsule, Rfl: 0    oxyCODONE (ROXICODONE) 5 mg immediate release tablet, 1 tablets every 6 hours as needed for pain , Disp: 8 tablet, Rfl: 0    polyethylene glycol (MIRALAX) 17 g packet, Take 17 g by mouth daily, Disp: 14 each, Rfl: 0    predniSONE 10 mg tablet, Take by mouth daily, Disp: , Rfl:     Psyllium (METAMUCIL FIBER PO), Take 1 packet by mouth 3 (three) times a day  , Disp: , Rfl:     Vitamin E 400 units TABS, Take 400 Units by mouth daily  , Disp: , Rfl:        Objective     Vitals:   Vitals:    09/09/20 1537   BP: 105/58   Pulse: 92   Resp: 17   Temp: 99 8 °F (37 7 °C)   SpO2: 99%       Physical Exam  Constitutional:       Appearance: She is well-developed  She is not diaphoretic  HENT:      Head: Normocephalic and atraumatic  Right Ear: Tympanic membrane, ear canal and external ear normal       Left Ear: Tympanic membrane, ear canal and external ear normal    Cardiovascular:      Rate and Rhythm: Normal rate and regular rhythm  Heart sounds: Normal heart sounds  No murmur  Pulmonary:      Effort: No respiratory distress  Breath sounds: Normal breath sounds  No wheezing or rales  Abdominal:      General: Bowel sounds are normal       Palpations: Abdomen is soft  Tenderness: There is no abdominal tenderness  Musculoskeletal:         General: No tenderness  Comments: Patient has right arm in a sling post surgery  Skin:     General: Skin is warm and dry  Neurological:      Mental Status: She is alert  Psychiatric:         Behavior: Behavior normal          Thought Content: Thought content normal          Pertinent Laboratory/Diagnostic Studies: The following labs/studies were reviewed please see facility chart for details

## 2020-09-09 NOTE — ASSESSMENT & PLAN NOTE
Operative acute blood loss anemia  Patient's hemoglobin yesterday was 8 5 hematocrit 27 7  Blood work performed today was 9 2 hematocrit 28 0  Will continue to monitor daily  She also received IV Venofer

## 2020-09-09 NOTE — ASSESSMENT & PLAN NOTE
Apparently at hospital she had no idea what her med regimen was  Is on methotrexate, plaquenil 100mg daily, and prednisone 10mg daily

## 2020-09-09 NOTE — PATIENT INSTRUCTIONS
1 - Increase Oxycodone to 7 5mg PO q 4 hours PRN  2 -  Physical and occupational therapy for right shoulder as indicated  3 - CBC with Diff in one week to ensure no further anemia

## 2020-09-09 NOTE — ASSESSMENT & PLAN NOTE
1 - Matters most:  Wants to be a full code, including all life-saving measures  She wishes to return to her home environment after rehabilitation and continue her baseline activities  She plans to live on the first floor of her home while she recovers  2 - Mobility:  Prior to shoulder injury she used a walker with front wheels  She had a walker on every floor  She denies history of recent falls  She takes her walker out to the store  She has been using her walker for approximately 5-6 years  3 - Memory:  Some short-term memory issues based off of mini-mental status exam       4 - Medications: Methotrexate will need to be monitored closely  Monitor LFTs and PFTs  Prednisone long-term can promote osteoporosis, cataracts, and high blood sugar

## 2020-09-11 ENCOUNTER — OFFICE VISIT (OUTPATIENT)
Dept: GERIATRICS | Facility: CLINIC | Age: 75
End: 2020-09-11
Payer: MEDICARE

## 2020-09-11 DIAGNOSIS — M05.79 RHEUMATOID ARTHRITIS INVOLVING MULTIPLE SITES WITH POSITIVE RHEUMATOID FACTOR (HCC): ICD-10-CM

## 2020-09-11 DIAGNOSIS — S46.011D TRAUMATIC ROTATOR CUFF TEAR, RIGHT, SUBSEQUENT ENCOUNTER: Primary | ICD-10-CM

## 2020-09-11 DIAGNOSIS — F41.9 ANXIETY: Chronic | ICD-10-CM

## 2020-09-11 DIAGNOSIS — K59.03 DRUG-INDUCED CONSTIPATION: ICD-10-CM

## 2020-09-11 DIAGNOSIS — R26.2 AMBULATORY DYSFUNCTION: ICD-10-CM

## 2020-09-11 PROCEDURE — 99309 SBSQ NF CARE MODERATE MDM 30: CPT | Performed by: NURSE PRACTITIONER

## 2020-09-14 ENCOUNTER — APPOINTMENT (OUTPATIENT)
Dept: RADIOLOGY | Facility: OTHER | Age: 75
End: 2020-09-14
Payer: COMMERCIAL

## 2020-09-14 ENCOUNTER — OFFICE VISIT (OUTPATIENT)
Dept: OBGYN CLINIC | Facility: OTHER | Age: 75
End: 2020-09-14

## 2020-09-14 VITALS
RESPIRATION RATE: 18 BRPM | TEMPERATURE: 97.4 F | OXYGEN SATURATION: 99 % | DIASTOLIC BLOOD PRESSURE: 61 MMHG | SYSTOLIC BLOOD PRESSURE: 125 MMHG | HEART RATE: 71 BPM

## 2020-09-14 VITALS
SYSTOLIC BLOOD PRESSURE: 110 MMHG | DIASTOLIC BLOOD PRESSURE: 68 MMHG | HEART RATE: 78 BPM | BODY MASS INDEX: 20.27 KG/M2 | HEIGHT: 58 IN

## 2020-09-14 DIAGNOSIS — Z47.1 AFTERCARE FOLLOWING RIGHT SHOULDER JOINT REPLACEMENT SURGERY: Primary | ICD-10-CM

## 2020-09-14 DIAGNOSIS — Z96.611 AFTERCARE FOLLOWING RIGHT SHOULDER JOINT REPLACEMENT SURGERY: Primary | ICD-10-CM

## 2020-09-14 DIAGNOSIS — Z96.611 AFTERCARE FOLLOWING RIGHT SHOULDER JOINT REPLACEMENT SURGERY: ICD-10-CM

## 2020-09-14 DIAGNOSIS — Z47.1 AFTERCARE FOLLOWING RIGHT SHOULDER JOINT REPLACEMENT SURGERY: ICD-10-CM

## 2020-09-14 PROBLEM — S46.011D TRAUMATIC ROTATOR CUFF TEAR, RIGHT, SUBSEQUENT ENCOUNTER: Status: ACTIVE | Noted: 2020-08-24

## 2020-09-14 PROCEDURE — 73030 X-RAY EXAM OF SHOULDER: CPT

## 2020-09-14 PROCEDURE — 99024 POSTOP FOLLOW-UP VISIT: CPT | Performed by: PHYSICIAN ASSISTANT

## 2020-09-14 NOTE — PATIENT INSTRUCTIONS
1  Sling as per protocol for 2 more weeks  2  PT per reverse total shoulder arthroplasty protocol  3  Outpatient PT once discharged from [facility]  4   Follow-up in 2 months

## 2020-09-14 NOTE — ASSESSMENT & PLAN NOTE
Dressing cd&i  Pain fairly controlled with tylenol/oxyIR prn  Will schedule tylenol/lido patch to try to dec oxyIR use  Continue PT OT at skilled level  Continue encourage cough and deep breathing exercise  Monitor signs symptoms infection  Monitor signs symptoms of DVT

## 2020-09-14 NOTE — PROGRESS NOTES
[address] [facility]  [address]  [phone]  [facility] Progress Note      NAME: Yahir Rivera  AGE: 67 y o  SEX: female    DATE OF ENCOUNTER: 9/11/2020    Assessment and Plan     Problem List Items Addressed This Visit        Digestive    Drug-induced constipation     Stable at present  Continues on MiraLax and Colace , Metamucil  Continue to encourage dietary fiber, fluids, mobility as able  Continue to monitor            Musculoskeletal and Integument    Rheumatoid arthritis involving multiple sites with positive rheumatoid factor (HCC)     Pain controlled at present  Continues on methotrexate, prednisone, hydroxychloroquine quinine  Continue to monitor  Traumatic rotator cuff tear, right, subsequent encounter - Primary     Dressing cd&i  Pain fairly controlled with tylenol/oxyIR prn  Will schedule tylenol/lido patch to try to dec oxyIR use  Continue PT OT at skilled level  Continue encourage cough and deep breathing exercise  Monitor signs symptoms infection  Monitor signs symptoms of DVT  Other    Anxiety     Stable at present  Continue to monitor  Relaxation techniques  Ambulatory dysfunction     Continue PT OT at skilled level  For strength and balance training               No orders of the defined types were placed in this encounter       - Counseling Documentation: patient was counseled regarding: instructions for management, patient and family education and impressions    Chief Complaint     No chief complaint on file  History of Present Illness     Patient seen for rehab follow-up  She is here status post right rotator cuff repair  Comorbidities include RA, ambulatory dysfunction, anxiety  Patient is doing well without complaint today  His pain is controlled with p r n  Tylenol and Oxy IR  She denies CP/SOB/cough  Denies GI/ distress  Participating in therapy  Eating and sleeping well    No complaints at present      The following portions of the patient's history were reviewed and updated as appropriate: allergies, current medications, past family history, past medical history, past social history, past surgical history and problem list     Review of Systems     Review of Systems   Constitutional: Negative for activity change, appetite change, chills and fatigue  HENT: Negative for congestion  Respiratory: Negative for cough and shortness of breath  Cardiovascular: Negative for chest pain  Gastrointestinal: Negative for abdominal pain, constipation, diarrhea, nausea and vomiting  Genitourinary: Negative for difficulty urinating  Musculoskeletal: Positive for arthralgias and gait problem  Skin: Positive for wound (Surgical)  Neurological: Negative for dizziness and light-headedness  Active Problem List     Patient Active Problem List   Diagnosis    Anxiety    Peripheral vascular disease ([facility] [address] )    RLS (restless legs syndrome)    S/P total hip arthroplasty    Hypothyroidism    Osteoporosis    RBBB    Ambulatory dysfunction    Closed compression fracture of L3 lumbar vertebra    Rheumatoid arthritis involving multiple sites with positive rheumatoid factor (HCC)    Chronic pain syndrome    Other insomnia    Vitamin D deficiency    Dyspepsia    Drug-induced constipation    Traumatic rotator cuff tear, right, subsequent encounter    Other forms of [facility]mat[facility]    Status post reverse total arthroplasty of right shoulder    Aftercare following joint replacement    Acute blood loss anemia    Depression    Goals of care, counseling/discussion       Objective     /61   Pulse 71   Temp (!) 97 4 °F (36 3 °C)   Resp 18   SpO2 99%     Physical Exam  Vitals signs and nursing note reviewed  Constitutional:       General: She is not in acute distress  Appearance: Normal appearance  She is well-developed  She is not diaphoretic  HENT:      Head: Normocephalic  Cardiovascular:      Rate and Rhythm: Normal rate  Heart sounds: Normal heart sounds  No murmur  No friction rub  No gallop  Pulmonary:      Effort: Pulmonary effort is normal  No respiratory distress  Breath sounds: Normal breath sounds  No wheezing or rales  Abdominal:      General: Bowel sounds are normal  There is no distension  Palpations: Abdomen is soft  Tenderness: There is no abdominal tenderness  There is no rebound  Musculoskeletal: Normal range of motion  Comments: RUE limited by recent surgery sling   Skin:     General: Skin is warm and dry  Comments: Dressing clean dry intact   Neurological:      General: No focal deficit present  Mental Status: She is alert and oriented to person, place, and time  Mental status is at baseline  Psychiatric:         Mood and Affect: Mood normal          Behavior: Behavior normal          Pertinent Laboratory/Diagnostic Studies:  Labs reviewed in facility paper chart  Current Medications   Medications were reviewed and updated  Please refer to paper chart for updated list of medications      CONNIE Madsen  9/14/2020 9:22 AM

## 2020-09-14 NOTE — PROGRESS NOTES
Assessment:       1  Aftercare following right shoulder joint replacement surgery          Plan:        Patient is doing well postoperatively  Operative note, images, and rehab protocol were discussed  Granddaughter is present to help with communication  All questions were addressed to the patient's satisfaction  Patient is at her [facility] and should receive PT per rehab protocol (copy provided) while there  She should schedule outpatient rehab once discharged from her [facility]    Follow-up will be in 2 months to assess patient's progress  Subjective:     Patient ID: Marko Summers is a 67 y o  female  Chief Complaint:    HPI    Patient presents for follow-up status post right reverse total shoulder arthroplasty on 09/02/2020  She states her pain from prior to surgery is improved  She denies any residual paresthesia in her right upper extremity  She reports she has not received therapy for her shoulder while at car [place]  Social History     Occupational History    Occupation: RETIRED    Tobacco Use    Smoking status: Former Smoker    Smokeless tobacco: Never Used    Tobacco comment: quit 20-30 years ago   Substance and Sexual Activity    Alcohol use: Not Currently     Frequency: Never    Drug use: Not Currently    Sexual activity: Not Currently      Review of Systems   Constitutional: Negative  Respiratory: Negative  Musculoskeletal: Positive for joint swelling  Negative for arthralgias  Skin: Positive for wound  Neurological: Positive for weakness  Negative for numbness  Psychiatric/Behavioral: Negative  Objective:     Ortho ExamPhysical Exam  HENT:      Head: Atraumatic  Cardiovascular:      Pulses: Normal pulses  Pulmonary:      Effort: Pulmonary effort is normal    Musculoskeletal:      Comments: Arm in sling  Range of motion and strength not tested  Skin:     General: Skin is warm and dry        Capillary Refill: Capillary refill takes less than 2 seconds  Comments: Incision dry and clean  Staples removed, Steri-Strips applied  Neurological:      Mental Status: She is alert and oriented to person, place, and time  Sensory: No sensory deficit  Psychiatric:         Mood and Affect: Mood normal          Behavior: Behavior normal            I have personally reviewed pertinent films in PACS  and I have personally reviewed pertinent films in PACS and my interpretation is Consistent with reverse total shoulder arthroplasty  Prosthesis in excellent position  Glenohumeral joint preserved

## 2020-09-14 NOTE — ASSESSMENT & PLAN NOTE
Stable at present  Continues on MiraLax and Colace , Metamucil  Continue to encourage dietary fiber, fluids, mobility as able    Continue to monitor

## 2020-09-14 NOTE — ASSESSMENT & PLAN NOTE
Pain controlled at present  Continues on methotrexate, prednisone, hydroxychloroquine quinine  Continue to monitor

## 2020-09-15 ENCOUNTER — OFFICE VISIT (OUTPATIENT)
Dept: GERIATRICS | Facility: CLINIC | Age: 75
End: 2020-09-15
Payer: MEDICARE

## 2020-09-15 VITALS
SYSTOLIC BLOOD PRESSURE: 122 MMHG | DIASTOLIC BLOOD PRESSURE: 60 MMHG | HEART RATE: 68 BPM | TEMPERATURE: 97.4 F | RESPIRATION RATE: 18 BRPM | OXYGEN SATURATION: 100 %

## 2020-09-15 DIAGNOSIS — R26.2 AMBULATORY DYSFUNCTION: ICD-10-CM

## 2020-09-15 DIAGNOSIS — M05.79 RHEUMATOID ARTHRITIS INVOLVING MULTIPLE SITES WITH POSITIVE RHEUMATOID FACTOR (HCC): ICD-10-CM

## 2020-09-15 DIAGNOSIS — K59.03 DRUG-INDUCED CONSTIPATION: ICD-10-CM

## 2020-09-15 DIAGNOSIS — S46.011D TRAUMATIC ROTATOR CUFF TEAR, RIGHT, SUBSEQUENT ENCOUNTER: Primary | ICD-10-CM

## 2020-09-15 DIAGNOSIS — F41.9 ANXIETY: ICD-10-CM

## 2020-09-15 PROCEDURE — 99309 SBSQ NF CARE MODERATE MDM 30: CPT | Performed by: NURSE PRACTITIONER

## 2020-09-15 NOTE — PROGRESS NOTES
[facility]  [address]  [phone]  [facility] Progress Note  code31      NAME: Osvaldo Rivera  AGE: 67 y o  SEX: female    DATE OF ENCOUNTER: 9/15/2020    Assessment and Plan     Problem List Items Addressed This Visit        Digestive    Drug-induced constipation     Stable at present  Cont colace/miralax  Encourage dietary and lifestyle interventions            Musculoskeletal and Integument    Rheumatoid arthritis involving multiple sites with positive rheumatoid factor (HCC)     Stable at present  Cont hydroxychloroquine, methotrexate, prednisone  Cont to monitor, f/u with rheum outpt         Traumatic rotator cuff tear, right, subsequent encounter - Primary     Pain controlled with scheduled tylenol/lido patch, prn oxyIR            Other    Anxiety     Doing well at present  Cont relaxation techniques and supportive care  Ambulatory dysfunction     Cont pt/ot at skilled level for strength and balance training  Fall precautions               No orders of the defined types were placed in this encounter       - Counseling Documentation: patient was counseled regarding: instructions for management, patient and family education and impressions    Chief Complaint     No chief complaint on file  History of Present Illness     Mrs Rivera is a 75yo female here at [facility] rehab s/p right rotator cuff repair  Comorbidities include htn, RA, dm2  Patient is oob in chair today  States she is doing ok  Pain in right shoulder only mild after receiving pain meds- requests no change in pain regimen at this time  Reports pain is achy, worse than RA pain  Denies cp/sob/cough  Denies gi/gu distress  Doing well with therapy  Eating and sleeping well  Saw ortho yesterday and stitches were removed        The following portions of the patient's history were reviewed and updated as appropriate: allergies, current medications, past family history, past medical history, past social history, past surgical history and problem list     Review of Systems     Review of Systems   Constitutional: Negative for activity change, appetite change, chills and fatigue  HENT: Negative for congestion  Respiratory: Negative for cough and shortness of breath  Cardiovascular: Negative for chest pain  Gastrointestinal: Negative for abdominal pain, constipation, diarrhea, nausea and vomiting  Genitourinary: Negative for difficulty urinating  Musculoskeletal: Positive for arthralgias (RUE) and gait problem  Negative for back pain  Neurological: Negative for dizziness and light-headedness  Active Problem List     Patient Active Problem List   Diagnosis    Anxiety    Peripheral vascular disease ([facility] [address] )    RLS (restless legs syndrome)    S/P total hip arthroplasty    Hypothyroidism    Osteoporosis    RBBB    Ambulatory dysfunction    Closed compression fracture of L3 lumbar vertebra    Rheumatoid arthritis involving multiple sites with positive rheumatoid factor (HCC)    Chronic pain syndrome    Other insomnia    Vitamin D deficiency    Dyspepsia    Drug-induced constipation    Traumatic rotator cuff tear, right, subsequent encounter    Other forms of [facility]    Status post reverse total arthroplasty of right shoulder    Aftercare following joint replacement    Acute blood loss anemia    Depression    Goals of care, counseling/discussion       Objective     /60   Pulse 68   Temp (!) 97 4 °F (36 3 °C)   Resp 18   SpO2 100%     Physical Exam  Vitals signs and nursing note reviewed  Constitutional:       General: She is not in acute distress  Appearance: She is well-developed  She is not diaphoretic  HENT:      Head: Normocephalic  Cardiovascular:      Rate and Rhythm: Normal rate  Heart sounds: Normal heart sounds  No murmur  No friction rub  No gallop      Pulmonary:      Effort: Pulmonary effort is normal  No respiratory distress  Breath sounds: Normal breath sounds  No wheezing or rales  Abdominal:      General: Bowel sounds are normal  There is no distension  Palpations: Abdomen is soft  Tenderness: There is no abdominal tenderness  There is no rebound  Musculoskeletal: Normal range of motion  Comments: Decreased RUE - sling on   Skin:     General: Skin is warm and dry  Comments: Right shoulder incision cd&i  steristrips intact   Neurological:      Mental Status: She is alert  Pertinent Laboratory/Diagnostic Studies:  Labs reviewed in facility paper chart  Current Medications   Medications were reviewed and updated  Please refer to paper chart for updated list of medications      CONNIE Yarbrough  9/17/2020 11:46 AM

## 2020-09-17 ENCOUNTER — OFFICE VISIT (OUTPATIENT)
Dept: GERIATRICS | Facility: CLINIC | Age: 75
End: 2020-09-17
Payer: MEDICARE

## 2020-09-17 DIAGNOSIS — K59.03 DRUG-INDUCED CONSTIPATION: ICD-10-CM

## 2020-09-17 DIAGNOSIS — R26.2 AMBULATORY DYSFUNCTION: ICD-10-CM

## 2020-09-17 DIAGNOSIS — S46.011D TRAUMATIC ROTATOR CUFF TEAR, RIGHT, SUBSEQUENT ENCOUNTER: Primary | ICD-10-CM

## 2020-09-17 PROCEDURE — 99308 SBSQ NF CARE LOW MDM 20: CPT | Performed by: NURSE PRACTITIONER

## 2020-09-17 NOTE — ASSESSMENT & PLAN NOTE
Stable at present  Cont hydroxychloroquine, methotrexate, prednisone    Cont to monitor, f/u with rheum outpt

## 2020-09-18 VITALS
HEART RATE: 68 BPM | TEMPERATURE: 97.8 F | SYSTOLIC BLOOD PRESSURE: 147 MMHG | RESPIRATION RATE: 18 BRPM | OXYGEN SATURATION: 99 % | DIASTOLIC BLOOD PRESSURE: 68 MMHG

## 2020-09-18 NOTE — ASSESSMENT & PLAN NOTE
Presently controlled with colace, metamucil,  MiraLax  Continue to monitor    Encourage dietary fiber, fluids,mobility

## 2020-09-18 NOTE — PROGRESS NOTES
[facility] [facility]  [address]  [phone]  [facility] Progress Note  code31      NAME: Néstor Rivera  AGE: 67 y o  SEX: female    DATE OF ENCOUNTER: 9/17/2020    Assessment and Plan     Problem List Items Addressed This Visit        Digestive    Drug-induced constipation     Presently controlled with colace, metamucil,  MiraLax  Continue to monitor  Encourage dietary fiber, fluids,mobility            Musculoskeletal and Integument    Traumatic rotator cuff tear, right, subsequent encounter - Primary     Site intact, pain controlled with present dosing OxyIR  Cont same  Cont pt/ot for strength and balance  Ortho follow-up as directed            Other    Ambulatory dysfunction     Continue PT OT at skilled level for strength and balance training frequent reminders and reassurances for safety  Fall precautions  ADC on Monday               No orders of the defined types were placed in this encounter       - Counseling Documentation: patient was counseled regarding: instructions for management, patient and family education and impressions    Chief Complaint     No chief complaint on file  History of Present Illness     Patient rehab status post rotator cuff repair  She is seen for wound care and follow-up today  She reports pain is controlled with current dosing of Tylenol, RA meds, Oxy IR p r n  Gerhardt Sep She is not having any constipation  Her incision is clean dry intact  She is participating in therapy  She denies CP/SOB/cough  Denies GI/ distress  Appetite is intact  She has ADC for Monday and is looking for to going home feels safe to do so        The following portions of the patient's history were reviewed and updated as appropriate: allergies, current medications, past family history, past medical history, past social history, past surgical history and problem list     Review of Systems     Review of Systems   Constitutional: Negative for activity change, appetite change, chills and fatigue  HENT: Negative for congestion  Respiratory: Negative for cough and shortness of breath  Cardiovascular: Negative for chest pain  Gastrointestinal: Negative for abdominal pain, constipation, diarrhea, nausea and vomiting  Genitourinary: Negative for difficulty urinating  Musculoskeletal: Positive for arthralgias, back pain and gait problem  Neurological: Negative for dizziness and light-headedness  Psychiatric/Behavioral: Positive for decreased concentration (forgetful mild)  Active Problem List     Patient Active Problem List   Diagnosis    Anxiety    [facility]    RLS (restless legs syndrome)    S/P total hip arthroplasty    Hypothyroidism    Osteoporosis    RBBB    Ambulatory dysfunction    Closed compression fracture of L3 lumbar vertebra    Rheumatoid arthritis involving multiple sites with positive rheumatoid factor (HCC)    Chronic pain syndrome    Other insomnia    Vitamin D deficiency    Dyspepsia    Drug-induced constipation    Traumatic rotator cuff tear, right, subsequent encounter    Other forms of systemic lupus erythematosus [facility]    Status post reverse total arthroplasty of right shoulder    Aftercare following joint replacement    Acute blood loss anemia    Depression    Goals of care, counseling/discussion       Objective     /68   Pulse 68   Temp 97 8 °F (36 6 °C)   Resp 18   SpO2 99%     Physical Exam  Vitals signs and nursing note reviewed  Constitutional:       General: She is not in acute distress  Appearance: She is well-developed  She is not diaphoretic  HENT:      Head: Normocephalic  Cardiovascular:      Rate and Rhythm: Normal rate  Heart sounds: Normal heart sounds  No murmur  No friction rub  No gallop  Pulmonary:      Effort: Pulmonary effort is normal  No respiratory distress  Breath sounds: Normal breath sounds  No wheezing or rales     Abdominal: General: Bowel sounds are normal  There is no distension  Palpations: Abdomen is soft  Tenderness: There is no abdominal tenderness  There is no rebound  Musculoskeletal: Normal range of motion  General: No swelling  Comments: Sling right upper extremity intact, decreased ROM   Skin:     General: Skin is warm and dry  Comments: RUE incision clean dry and intact   Neurological:      Mental Status: She is alert  Pertinent Laboratory/Diagnostic Studies:  Labs reviewed in facility paper chart  Current Medications   Medications were reviewed and updated  Please refer to paper chart for updated list of medications      CONNIE Greene  9/18/2020 1:40 PM

## 2020-09-18 NOTE — ASSESSMENT & PLAN NOTE
Continue PT OT at skilled level for strength and balance training frequent reminders and reassurances for safety  Fall precautions    ADC on Monday

## 2020-09-18 NOTE — ASSESSMENT & PLAN NOTE
Site intact, pain controlled with present dosing OxyIR  Cont same    Cont pt/ot for strength and balance  Ortho follow-up as directed

## 2020-09-21 ENCOUNTER — NURSING HOME VISIT (OUTPATIENT)
Dept: GERIATRICS | Facility: OTHER | Age: 75
End: 2020-09-21

## 2020-09-21 ENCOUNTER — OFFICE VISIT (OUTPATIENT)
Dept: GERIATRICS | Facility: CLINIC | Age: 75
End: 2020-09-21
Payer: MEDICARE

## 2020-09-21 VITALS
TEMPERATURE: 97.8 F | SYSTOLIC BLOOD PRESSURE: 106 MMHG | HEART RATE: 65 BPM | OXYGEN SATURATION: 98 % | DIASTOLIC BLOOD PRESSURE: 58 MMHG | RESPIRATION RATE: 18 BRPM

## 2020-09-21 DIAGNOSIS — S46.011D TRAUMATIC ROTATOR CUFF TEAR, RIGHT, SUBSEQUENT ENCOUNTER: Primary | ICD-10-CM

## 2020-09-21 DIAGNOSIS — S46.011A TRAUMATIC ROTATOR CUFF TEAR, RIGHT, INITIAL ENCOUNTER: ICD-10-CM

## 2020-09-21 DIAGNOSIS — R26.2 AMBULATORY DYSFUNCTION: ICD-10-CM

## 2020-09-21 DIAGNOSIS — M05.79 RHEUMATOID ARTHRITIS INVOLVING MULTIPLE SITES WITH POSITIVE RHEUMATOID FACTOR (HCC): ICD-10-CM

## 2020-09-21 DIAGNOSIS — Z47.1 AFTERCARE FOLLOWING RIGHT SHOULDER JOINT REPLACEMENT SURGERY: ICD-10-CM

## 2020-09-21 DIAGNOSIS — Z71.89 GOALS OF CARE, COUNSELING/DISCUSSION: ICD-10-CM

## 2020-09-21 DIAGNOSIS — Z96.611 AFTERCARE FOLLOWING RIGHT SHOULDER JOINT REPLACEMENT SURGERY: ICD-10-CM

## 2020-09-21 DIAGNOSIS — F41.9 ANXIETY: ICD-10-CM

## 2020-09-21 DIAGNOSIS — M32.8 OTHER FORMS OF SYSTEMIC LUPUS ERYTHEMATOSUS, UNSPECIFIED ORGAN INVOLVEMENT STATUS (HCC): ICD-10-CM

## 2020-09-21 DIAGNOSIS — G89.4 CHRONIC PAIN SYNDROME: ICD-10-CM

## 2020-09-21 DIAGNOSIS — M19.011 GLENOHUMERAL ARTHRITIS, RIGHT: ICD-10-CM

## 2020-09-21 DIAGNOSIS — K59.03 DRUG-INDUCED CONSTIPATION: ICD-10-CM

## 2020-09-21 PROCEDURE — 99316 NF DSCHRG MGMT 30 MIN+: CPT | Performed by: NURSE PRACTITIONER

## 2020-09-21 RX ORDER — OXYCODONE HYDROCHLORIDE 5 MG/1
5 TABLET ORAL EVERY 6 HOURS PRN
Qty: 10 TABLET | Refills: 0 | Status: SHIPPED | OUTPATIENT
Start: 2020-09-21 | End: 2020-10-12 | Stop reason: ALTCHOICE

## 2020-09-21 NOTE — ASSESSMENT & PLAN NOTE
Currently acute on chronic pain - doing better  Cont gabapentin, methotrexate for chronic pain  Cont tylenol/lido patch, prn oxyIR for acute right shoulder pain  Monitor constipation  Pt aware to f/u with ortho if worsening or continued unrelieved shoulder pain

## 2020-09-21 NOTE — ASSESSMENT & PLAN NOTE
Pain controlled, will decrease oxyIR to 5mg q6h prn - rx escribed  Cont pt/ot at home - taxing effort to leave d/t use of assistive device  Incision healing, cont to monitor for ss infection  F/u with ortho as directed

## 2020-09-21 NOTE — ASSESSMENT & PLAN NOTE
F/u outpt as directed  Stable at present  Cont on, methotrexate, prednisone, gabapentin  Cont to monitor

## 2020-09-21 NOTE — ASSESSMENT & PLAN NOTE
What matter most:  Return to home, relief from pain  Mobility:  Currently wide based quad cane, indep with transfers and ambulation  NWB to RUE, sling at all times  Mentation: a&ox3  Medications:  Monitor use of oxyIR, chronic pain meds    Would wean down on tylenol as tolerated

## 2020-09-21 NOTE — PROGRESS NOTES
[address] [facility]  [address]  [phone]  [address]      NAME: Osvaldo Rivera  AGE: 67 y o  SEX: female    DATE OF ENCOUNTER: 9/21/2020    Assessment and Plan     Problem List Items Addressed This Visit        Digestive    Drug-induced constipation     Controlled at present  Cont colace/miralax  Cont dietary fiber, fluids, mobility as able  monitor            Musculoskeletal and Integument    Rheumatoid arthritis involving multiple sites with positive rheumatoid factor (HCC)     F/u outpt as directed  Stable at present  Cont on, methotrexate, prednisone, gabapentin  Cont to monitor         Traumatic rotator cuff tear, right, subsequent encounter - Primary     Pain controlled, will decrease oxyIR to 5mg q6h prn - rx escribed  Cont pt/ot at home - taxing effort to leave d/t use of assistive device  Incision healing, cont to monitor for ss infection  F/u with ortho as directed              Other    Anxiety     Stable at rehab   Cont nortriptyline  Cont relaxation techniques           Ambulatory dysfunction     Pt/ot to cont via home care  Fall precautions         Chronic pain syndrome     Currently acute on chronic pain - doing better  Cont gabapentin, methotrexate for chronic pain  Cont tylenol/lido patch, prn oxyIR for acute right shoulder pain  Monitor constipation  Pt aware to f/u with ortho if worsening or continued unrelieved shoulder pain         Other forms of systemic lupus erythematosus (HCC)     Stable  Cont hydroxychloroquine  Cont to monitor & f/u outpt         Aftercare following joint replacement     Pain controlled  Incision healing  Pt/ot cont         Goals of care, counseling/discussion     What matter most:  Return to home, relief from pain  Mobility:  Currently wide based quad cane, indep with transfers and ambulation  NWB to RUE, sling at all times  Mentation: a&ox3  Medications:  Monitor use of oxyIR, chronic pain meds    Would wean down on tylenol as tolerated               No orders of the defined types were placed in this encounter       - Counseling Documentation: patient was counseled regarding: instructions for management and prognosis    Chief Complaint     No chief complaint on file  History of Present Illness     Mrs Rivera is a 69-year-old patient who comes to rehab at [facility] status post rotator cuff surgery repair  Comorbidities include RA, anxiety, ambulatory dysfunction  During visit vital signs were monitored found to be stable  Patient was having pain in right upper extremity, continued on OxyIR  Continued on Tylenol/Lido patch  This did help to manage pain  She is feeling better at this point  Patient participated in therapy  Currently independent with transfers and wide base quad cane  NWB RUE  One assist adls  No MOCA available at time of note  Patient seen for discharge evaluation  She states the pain is much better and right upper extremity  She denies CP/SOB/cough  Denies GI/ distress  She states that she has been tolerating therapy well and doing better, she is ready to go home and complete therapy there  The following portions of the patient's history were reviewed and updated as appropriate: allergies, current medications, past family history, past medical history, past social history, past surgical history and problem list     Review of Systems     Review of Systems   Constitutional: Negative for activity change, appetite change, chills and fatigue  HENT: Negative for congestion  Respiratory: Negative for cough and shortness of breath  Cardiovascular: Negative for chest pain  Gastrointestinal: Negative for abdominal pain, constipation, diarrhea, nausea and vomiting  Genitourinary: Negative for difficulty urinating  Musculoskeletal: Positive for arthralgias (RUE) and gait problem  Negative for back pain     Neurological: Negative for dizziness and light-headedness  Active Problem List     Patient Active Problem List   Diagnosis    Anxiety    Peripheral vascular disease ([facility] )    RLS (restless legs syndrome)    S/P total hip arthroplasty    Hypothyroidism    Osteoporosis    RBBB    Ambulatory dysfunction    Closed compression fracture of L3 lumbar vertebra    Rheumatoid arthritis involving multiple sites with positive rheumatoid factor (HCC)    Chronic pain syndrome    Other insomnia    Vitamin D deficiency    Dyspepsia    Drug-induced constipation    Traumatic rotator cuff tear, right, subsequent encounter    Other forms of systemic lupus erythematosus ([facility] )    Status post reverse total arthroplasty of right shoulder    Aftercare following joint replacement    Acute blood loss anemia    Depression    Goals of care, counseling/discussion       Objective     /58   Pulse 65   Temp 97 8 °F (36 6 °C)   Resp 18   SpO2 98%     Physical Exam  Vitals signs and nursing note reviewed  Constitutional:       General: She is not in acute distress  Appearance: She is well-developed  She is not diaphoretic  HENT:      Head: Normocephalic  Cardiovascular:      Rate and Rhythm: Normal rate  Heart sounds: Normal heart sounds  No murmur  No friction rub  No gallop  Pulmonary:      Effort: Pulmonary effort is normal  No respiratory distress  Breath sounds: Normal breath sounds  No wheezing or rales  Abdominal:      General: Bowel sounds are normal  There is no distension  Palpations: Abdomen is soft  Tenderness: There is no abdominal tenderness  There is no rebound  Musculoskeletal: Normal range of motion  Comments: SLING ON RUE   Skin:     General: Skin is warm and dry  Comments: INCISION HEALING, CLEAN DRY AND INTACT   Neurological:      General: No focal deficit present  Mental Status: She is alert and oriented to person, place, and time  Mental status is at baseline     Psychiatric: Mood and Affect: Mood normal          Behavior: Behavior normal          Pertinent Laboratory/Diagnostic Studies:  Labs reviewed in facility paper chart  Current Medications   Medications were reviewed and updated       Current Outpatient Medications:     acetaminophen (TYLENOL) 325 mg tablet, Take prn for mild pain, Disp: 30 tablet, Rfl: 0    Ascorbic Acid (VITAMIN C) 1000 MG tablet, Take 1,000 mg by mouth daily, Disp: , Rfl:     cholecalciferol (VITAMIN D3) 1,000 units tablet, Take 1,000 Units by mouth daily, Disp: , Rfl:     docusate sodium (COLACE) 100 mg capsule, Take 1 capsule (100 mg total) by mouth 2 (two) times a day, Disp: 10 capsule, Rfl: 0    famotidine (PEPCID) 20 mg tablet, Take 1 tablet (20 mg total) by mouth 2 (two) times a day, Disp: 90 tablet, Rfl: 1    folic acid (FOLVITE) 1 mg tablet, TAKE ONE TABLET BY MOUTH EVERY DAY, Disp: 30 tablet, Rfl: 2    gabapentin (NEURONTIN) 300 mg capsule, TAKE ONE CAPSULE BY MOUTH THREE TIMES A DAY, Disp: 270 capsule, Rfl: 1    hydroxychloroquine (PLAQUENIL) 200 mg tablet, 1 pill am 1/2 pill pm, Disp: , Rfl:     levothyroxine 25 mcg tablet, Take 1 tablet (25 mcg total) by mouth daily, Disp: 90 tablet, Rfl: 1    methotrexate 2 5 MG tablet, 3 tablets by mouth once a week on Thursday, Disp:  , Rfl:     Multiple Vitamins-Minerals (CENTRUM SILVER PO), Take 1 tablet by mouth daily, Disp: , Rfl:     nortriptyline (PAMELOR) 50 mg capsule, TAKE ONE CAPSULE BY MOUTH AT BEDTIME, Disp: 90 capsule, Rfl: 0    oxyCODONE (ROXICODONE) 5 mg immediate release tablet, Take 1 tablet (5 mg total) by mouth every 6 (six) hours as needed for moderate pain or severe painMax Daily Amount: 20 mg, Disp: 10 tablet, Rfl: 0    polyethylene glycol (MIRALAX) 17 g packet, Take 17 g by mouth daily, Disp: 14 each, Rfl: 0    predniSONE 10 mg tablet, Take by mouth daily, Disp: , Rfl:     Psyllium (METAMUCIL FIBER PO), Take 1 packet by mouth 3 (three) times a day  , Disp: , Rfl:    Vitamin E 400 units TABS, Take 400 Units by mouth daily  , Disp: , Rfl:     >30min spent on discharge:  Pt visit, med rec, coordination of care, chart review      PCP update:  pcp notified of discharge via CARINE Burr to fax all rehab info upon discharge    Dayanara Macias  9/21/2020 2:59 PM

## 2020-09-24 ENCOUNTER — TELEPHONE (OUTPATIENT)
Dept: OBGYN CLINIC | Facility: HOSPITAL | Age: 75
End: 2020-09-24

## 2020-09-24 NOTE — TELEPHONE ENCOUNTER
[address] [facility] [phone]    Needs call back  On if he should be wearing sling and any protocols or restrictions PO

## 2020-09-24 NOTE — TELEPHONE ENCOUNTER
Patient was given protocol at her appointment - they should have this  Sling x 4 weeks - per the protocol    Reverse total shoulder protocol (the newest one that is only 1 sheet of paper)

## 2020-09-25 NOTE — PROGRESS NOTES
[facility]  [address]  [phone]  [facility] Progress Note      NAME: Samuel Rivera  AGE: 67 y o  SEX: female    DATE OF ENCOUNTER: 9/25/2020    Assessment and Plan     Problem List Items Addressed This Visit     None      Visit Diagnoses     Traumatic rotator cuff tear, right, initial encounter        Relevant Medications    oxyCODONE (ROXICODONE) 5 mg immediate release tablet    Glenohumeral arthritis, right        Relevant Medications    oxyCODONE (ROXICODONE) 5 mg immediate release tablet          No orders of the defined types were placed in this encounter  Order only, see other note dated 9/21/2020  Chief Complaint     No chief complaint on file  History of Present Illness     HPI    The following portions of the patient's history were reviewed and updated as appropriate: allergies, current medications, past family history, past medical history, past social history, past surgical history and problem list     Review of Systems     Review of Systems    Active Problem List     Patient Active Problem List   Diagnosis    Anxiety    Peripheral vascular disease ([address] )    RLS (restless legs syndrome)    S/P total hip arthroplasty    Hypothyroidism    Osteoporosis    RBBB    Ambulatory dysfunction    Closed compression fracture of L3 lumbar vertebra    Rheumatoid arthritis involving multiple sites with positive rheumatoid factor (HCC)    Chronic pain syndrome    Other insomnia    Vitamin D deficiency    Dyspepsia    Drug-induced constipation    Traumatic rotator cuff tear, right, subsequent encounter    Other forms of systemic lupus erythematosus ([address] )    Status post reverse total arthroplasty of right shoulder    Aftercare following joint replacement    Acute blood loss anemia    Depression    Goals of care, counseling/discussion       Objective     There were no vitals taken for this visit      Physical Exam    Pertinent Laboratory/Diagnostic Studies:  Labs reviewed in facility paper chart  Current Medications   Medications were reviewed and updated  Please refer to paper chart for updated list of medications      CONNIE Camarena  9/25/2020 9:54 AM

## 2020-09-25 NOTE — TELEPHONE ENCOUNTER
Spoke to Padmini  He stated the patient could not find the protocol when he was with her yesterday  He is asking if it can be emailed to him at [email or web address]    If it cannot be emailed, please send another copy int he mail to the patient's home      Thank you

## 2020-10-01 ENCOUNTER — TRANSCRIBE ORDERS (OUTPATIENT)
Dept: LAB | Facility: HOSPITAL | Age: 75
End: 2020-10-01

## 2020-10-01 ENCOUNTER — APPOINTMENT (OUTPATIENT)
Dept: LAB | Facility: HOSPITAL | Age: 75
End: 2020-10-01
Attending: INTERNAL MEDICINE
Payer: MEDICARE

## 2020-10-01 DIAGNOSIS — M81.0 SENILE OSTEOPOROSIS: ICD-10-CM

## 2020-10-01 DIAGNOSIS — M32.0 DRUG-INDUCED SYSTEMIC LUPUS ERYTHEMATOSUS, UNSPECIFIED ORGAN INVOLVEMENT STATUS (HCC): ICD-10-CM

## 2020-10-01 DIAGNOSIS — M32.0 DRUG-INDUCED SYSTEMIC LUPUS ERYTHEMATOSUS, UNSPECIFIED ORGAN INVOLVEMENT STATUS (HCC): Primary | ICD-10-CM

## 2020-10-01 LAB
25(OH)D3 SERPL-MCNC: 43.6 NG/ML (ref 30–100)
ALBUMIN SERPL BCP-MCNC: 3.7 G/DL (ref 3.5–5)
ALP SERPL-CCNC: 94 U/L (ref 46–116)
ALT SERPL W P-5'-P-CCNC: 20 U/L (ref 12–78)
AST SERPL W P-5'-P-CCNC: 13 U/L (ref 5–45)
BASOPHILS # BLD AUTO: 0.02 THOUSANDS/ΜL (ref 0–0.1)
BASOPHILS NFR BLD AUTO: 0 % (ref 0–1)
BILIRUB DIRECT SERPL-MCNC: 0.13 MG/DL (ref 0–0.2)
BILIRUB SERPL-MCNC: 0.41 MG/DL (ref 0.2–1)
C3 SERPL-MCNC: 103 MG/DL (ref 90–180)
C4 SERPL-MCNC: 25 MG/DL (ref 10–40)
CALCIUM SERPL-MCNC: 8.8 MG/DL (ref 8.3–10.1)
CREAT SERPL-MCNC: 0.51 MG/DL (ref 0.6–1.3)
EOSINOPHIL # BLD AUTO: 0.02 THOUSAND/ΜL (ref 0–0.61)
EOSINOPHIL NFR BLD AUTO: 0 % (ref 0–6)
ERYTHROCYTE [DISTWIDTH] IN BLOOD BY AUTOMATED COUNT: 14.5 % (ref 11.6–15.1)
GFR SERPL CREATININE-BSD FRML MDRD: 94 ML/MIN/1.73SQ M
HCT VFR BLD AUTO: 34.8 % (ref 34.8–46.1)
HGB BLD-MCNC: 11 G/DL (ref 11.5–15.4)
IMM GRANULOCYTES # BLD AUTO: 0.05 THOUSAND/UL (ref 0–0.2)
IMM GRANULOCYTES NFR BLD AUTO: 1 % (ref 0–2)
LYMPHOCYTES # BLD AUTO: 0.47 THOUSANDS/ΜL (ref 0.6–4.47)
LYMPHOCYTES NFR BLD AUTO: 6 % (ref 14–44)
MCH RBC QN AUTO: 29.7 PG (ref 26.8–34.3)
MCHC RBC AUTO-ENTMCNC: 31.6 G/DL (ref 31.4–37.4)
MCV RBC AUTO: 94 FL (ref 82–98)
MONOCYTES # BLD AUTO: 0.36 THOUSAND/ΜL (ref 0.17–1.22)
MONOCYTES NFR BLD AUTO: 5 % (ref 4–12)
NEUTROPHILS # BLD AUTO: 6.8 THOUSANDS/ΜL (ref 1.85–7.62)
NEUTS SEG NFR BLD AUTO: 88 % (ref 43–75)
NRBC BLD AUTO-RTO: 0 /100 WBCS
PLATELET # BLD AUTO: 275 THOUSANDS/UL (ref 149–390)
PMV BLD AUTO: 8.7 FL (ref 8.9–12.7)
PROT SERPL-MCNC: 6.9 G/DL (ref 6.4–8.2)
RBC # BLD AUTO: 3.7 MILLION/UL (ref 3.81–5.12)
RHEUMATOID FACT SER QL LA: NEGATIVE
WBC # BLD AUTO: 7.72 THOUSAND/UL (ref 4.31–10.16)

## 2020-10-01 PROCEDURE — 80076 HEPATIC FUNCTION PANEL: CPT

## 2020-10-01 PROCEDURE — 85025 COMPLETE CBC W/AUTO DIFF WBC: CPT

## 2020-10-01 PROCEDURE — 86038 ANTINUCLEAR ANTIBODIES: CPT

## 2020-10-01 PROCEDURE — 86039 ANTINUCLEAR ANTIBODIES (ANA): CPT

## 2020-10-01 PROCEDURE — 86430 RHEUMATOID FACTOR TEST QUAL: CPT

## 2020-10-01 PROCEDURE — 82310 ASSAY OF CALCIUM: CPT

## 2020-10-01 PROCEDURE — 82306 VITAMIN D 25 HYDROXY: CPT

## 2020-10-01 PROCEDURE — 86225 DNA ANTIBODY NATIVE: CPT

## 2020-10-01 PROCEDURE — 82565 ASSAY OF CREATININE: CPT

## 2020-10-01 PROCEDURE — 36415 COLL VENOUS BLD VENIPUNCTURE: CPT

## 2020-10-01 PROCEDURE — 86160 COMPLEMENT ANTIGEN: CPT

## 2020-10-02 LAB
ANA HOMOGEN SER QL IF: NORMAL
ANA HOMOGEN TITR SER: NORMAL {TITER}
DSDNA AB SER-ACNC: 9 IU/ML (ref 0–9)
RYE IGE QN: POSITIVE

## 2020-10-09 ENCOUNTER — TELEPHONE (OUTPATIENT)
Dept: INTERNAL MEDICINE CLINIC | Facility: CLINIC | Age: 75
End: 2020-10-09

## 2020-10-12 ENCOUNTER — OFFICE VISIT (OUTPATIENT)
Dept: INTERNAL MEDICINE CLINIC | Facility: CLINIC | Age: 75
End: 2020-10-12

## 2020-10-12 VITALS
SYSTOLIC BLOOD PRESSURE: 120 MMHG | HEART RATE: 82 BPM | BODY MASS INDEX: 20.2 KG/M2 | WEIGHT: 96.2 LBS | OXYGEN SATURATION: 99 % | HEIGHT: 58 IN | TEMPERATURE: 97 F | DIASTOLIC BLOOD PRESSURE: 80 MMHG

## 2020-10-12 DIAGNOSIS — E06.3 HYPOTHYROIDISM DUE TO HASHIMOTO'S THYROIDITIS: ICD-10-CM

## 2020-10-12 DIAGNOSIS — Z96.611 STATUS POST REVERSE TOTAL ARTHROPLASTY OF RIGHT SHOULDER: Primary | ICD-10-CM

## 2020-10-12 DIAGNOSIS — M81.0 AGE-RELATED OSTEOPOROSIS WITHOUT CURRENT PATHOLOGICAL FRACTURE: ICD-10-CM

## 2020-10-12 DIAGNOSIS — M32.19 OTHER SYSTEMIC LUPUS ERYTHEMATOSUS WITH OTHER ORGAN INVOLVEMENT (HCC): ICD-10-CM

## 2020-10-12 DIAGNOSIS — E78.00 PURE HYPERCHOLESTEROLEMIA: ICD-10-CM

## 2020-10-12 DIAGNOSIS — M05.79 RHEUMATOID ARTHRITIS INVOLVING MULTIPLE SITES WITH POSITIVE RHEUMATOID FACTOR (HCC): ICD-10-CM

## 2020-10-12 DIAGNOSIS — D62 ACUTE BLOOD LOSS ANEMIA: ICD-10-CM

## 2020-10-12 DIAGNOSIS — E03.8 HYPOTHYROIDISM DUE TO HASHIMOTO'S THYROIDITIS: ICD-10-CM

## 2020-10-12 PROBLEM — I73.9 PERIPHERAL VASCULAR DISEASE (HCC): Chronic | Status: RESOLVED | Noted: 2017-08-30 | Resolved: 2020-10-12

## 2020-10-12 PROBLEM — Z71.89 GOALS OF CARE, COUNSELING/DISCUSSION: Status: RESOLVED | Noted: 2020-09-09 | Resolved: 2020-10-12

## 2020-10-12 PROCEDURE — 99214 OFFICE O/P EST MOD 30 MIN: CPT | Performed by: PHYSICIAN ASSISTANT

## 2020-10-12 RX ORDER — PREDNISONE 1 MG/1
5 TABLET ORAL DAILY
COMMUNITY
Start: 2020-09-28

## 2020-10-14 ENCOUNTER — TELEMEDICINE (OUTPATIENT)
Dept: CARDIOLOGY CLINIC | Facility: CLINIC | Age: 75
End: 2020-10-14
Payer: MEDICARE

## 2020-10-14 DIAGNOSIS — M05.79 RHEUMATOID ARTHRITIS INVOLVING MULTIPLE SITES WITH POSITIVE RHEUMATOID FACTOR (HCC): ICD-10-CM

## 2020-10-14 DIAGNOSIS — I45.10 RBBB: Primary | ICD-10-CM

## 2020-10-14 PROCEDURE — 99443 PR PHYS/QHP TELEPHONE EVALUATION 21-30 MIN: CPT | Performed by: INTERNAL MEDICINE

## 2020-10-14 RX ORDER — IBUPROFEN 200 MG
TABLET ORAL EVERY 6 HOURS PRN
COMMUNITY
End: 2021-06-03 | Stop reason: HOSPADM

## 2020-10-23 ENCOUNTER — TELEPHONE (OUTPATIENT)
Dept: INTERNAL MEDICINE CLINIC | Facility: CLINIC | Age: 75
End: 2020-10-23

## 2020-10-23 DIAGNOSIS — N39.0 URINARY TRACT INFECTION WITHOUT HEMATURIA, SITE UNSPECIFIED: Primary | ICD-10-CM

## 2020-10-23 RX ORDER — SULFAMETHOXAZOLE AND TRIMETHOPRIM 800; 160 MG/1; MG/1
1 TABLET ORAL 2 TIMES DAILY
Qty: 6 TABLET | Refills: 0 | Status: SHIPPED | OUTPATIENT
Start: 2020-10-23 | End: 2020-10-26

## 2020-10-27 ENCOUNTER — HOSPITAL ENCOUNTER (OUTPATIENT)
Dept: INFUSION CENTER | Facility: HOSPITAL | Age: 75
Discharge: HOME | End: 2020-10-27
Payer: MEDICARE

## 2020-10-27 ENCOUNTER — TELEPHONE (OUTPATIENT)
Dept: INTERNAL MEDICINE CLINIC | Facility: CLINIC | Age: 75
End: 2020-10-27

## 2020-10-27 ENCOUNTER — LAB (OUTPATIENT)
Dept: LAB | Facility: HOSPITAL | Age: 75
End: 2020-10-27
Payer: MEDICARE

## 2020-10-27 DIAGNOSIS — E61.1 LOW SERUM IRON: ICD-10-CM

## 2020-10-27 DIAGNOSIS — E03.8 HYPOTHYROIDISM DUE TO HASHIMOTO'S THYROIDITIS: ICD-10-CM

## 2020-10-27 DIAGNOSIS — E06.3 HYPOTHYROIDISM DUE TO HASHIMOTO'S THYROIDITIS: ICD-10-CM

## 2020-10-27 DIAGNOSIS — E78.00 PURE HYPERCHOLESTEROLEMIA: ICD-10-CM

## 2020-10-27 LAB
ALBUMIN SERPL BCP-MCNC: 3.7 G/DL (ref 3.5–5)
ALP SERPL-CCNC: 100 U/L (ref 46–116)
ALT SERPL W P-5'-P-CCNC: 19 U/L (ref 12–78)
ANION GAP SERPL CALCULATED.3IONS-SCNC: 4 MMOL/L (ref 4–13)
AST SERPL W P-5'-P-CCNC: 17 U/L (ref 5–45)
BASOPHILS # BLD AUTO: 0.02 THOUSANDS/ΜL (ref 0–0.1)
BASOPHILS NFR BLD AUTO: 0 % (ref 0–1)
BILIRUB SERPL-MCNC: 0.28 MG/DL (ref 0.2–1)
BUN SERPL-MCNC: 13 MG/DL (ref 5–25)
CALCIUM SERPL-MCNC: 9.1 MG/DL (ref 8.3–10.1)
CHLORIDE SERPL-SCNC: 108 MMOL/L (ref 100–108)
CHOLEST SERPL-MCNC: 173 MG/DL (ref 50–200)
CO2 SERPL-SCNC: 28 MMOL/L (ref 21–32)
CREAT SERPL-MCNC: 0.75 MG/DL (ref 0.6–1.3)
EOSINOPHIL # BLD AUTO: 0.02 THOUSAND/ΜL (ref 0–0.61)
EOSINOPHIL NFR BLD AUTO: 0 % (ref 0–6)
ERYTHROCYTE [DISTWIDTH] IN BLOOD BY AUTOMATED COUNT: 15.2 % (ref 11.6–15.1)
GFR SERPL CREATININE-BSD FRML MDRD: 78 ML/MIN/1.73SQ M
GLUCOSE P FAST SERPL-MCNC: 95 MG/DL (ref 65–99)
HCT VFR BLD AUTO: 34.9 % (ref 34.8–46.1)
HDLC SERPL-MCNC: 95 MG/DL
HGB BLD-MCNC: 10.5 G/DL (ref 11.5–15.4)
IMM GRANULOCYTES # BLD AUTO: 0.05 THOUSAND/UL (ref 0–0.2)
IMM GRANULOCYTES NFR BLD AUTO: 1 % (ref 0–2)
LDLC SERPL CALC-MCNC: 66 MG/DL (ref 0–100)
LYMPHOCYTES # BLD AUTO: 0.77 THOUSANDS/ΜL (ref 0.6–4.47)
LYMPHOCYTES NFR BLD AUTO: 13 % (ref 14–44)
MCH RBC QN AUTO: 28.8 PG (ref 26.8–34.3)
MCHC RBC AUTO-ENTMCNC: 30.1 G/DL (ref 31.4–37.4)
MCV RBC AUTO: 96 FL (ref 82–98)
MONOCYTES # BLD AUTO: 0.68 THOUSAND/ΜL (ref 0.17–1.22)
MONOCYTES NFR BLD AUTO: 11 % (ref 4–12)
NEUTROPHILS # BLD AUTO: 4.44 THOUSANDS/ΜL (ref 1.85–7.62)
NEUTS SEG NFR BLD AUTO: 75 % (ref 43–75)
NRBC BLD AUTO-RTO: 0 /100 WBCS
PLATELET # BLD AUTO: 331 THOUSANDS/UL (ref 149–390)
PMV BLD AUTO: 8.7 FL (ref 8.9–12.7)
POTASSIUM SERPL-SCNC: 4.2 MMOL/L (ref 3.5–5.3)
PROT SERPL-MCNC: 6.7 G/DL (ref 6.4–8.2)
RBC # BLD AUTO: 3.64 MILLION/UL (ref 3.81–5.12)
SODIUM SERPL-SCNC: 140 MMOL/L (ref 136–145)
TRIGL SERPL-MCNC: 62 MG/DL
TSH SERPL DL<=0.05 MIU/L-ACNC: 1.53 UIU/ML (ref 0.36–3.74)
WBC # BLD AUTO: 5.98 THOUSAND/UL (ref 4.31–10.16)

## 2020-10-27 PROCEDURE — 80053 COMPREHEN METABOLIC PANEL: CPT

## 2020-10-27 PROCEDURE — 86255 FLUORESCENT ANTIBODY SCREEN: CPT

## 2020-10-27 PROCEDURE — 84443 ASSAY THYROID STIM HORMONE: CPT

## 2020-10-27 PROCEDURE — 85025 COMPLETE CBC W/AUTO DIFF WBC: CPT | Performed by: PHYSICIAN ASSISTANT

## 2020-10-27 PROCEDURE — 36415 COLL VENOUS BLD VENIPUNCTURE: CPT | Performed by: PHYSICIAN ASSISTANT

## 2020-10-27 PROCEDURE — 83516 IMMUNOASSAY NONANTIBODY: CPT

## 2020-10-27 PROCEDURE — 80061 LIPID PANEL: CPT

## 2020-10-27 PROCEDURE — 82784 ASSAY IGA/IGD/IGG/IGM EACH: CPT

## 2020-10-27 PROCEDURE — 96401 CHEMO ANTI-NEOPL SQ/IM: CPT

## 2020-10-27 RX ADMIN — DENOSUMAB 60 MG: 60 INJECTION SUBCUTANEOUS at 13:57

## 2020-10-28 LAB
ENDOMYSIUM IGA SER QL: NEGATIVE
GLIADIN PEPTIDE IGA SER-ACNC: 3 UNITS (ref 0–19)
GLIADIN PEPTIDE IGG SER-ACNC: 3 UNITS (ref 0–19)
IGA SERPL-MCNC: 214 MG/DL (ref 64–422)
TTG IGA SER-ACNC: <2 U/ML (ref 0–3)
TTG IGG SER-ACNC: 5 U/ML (ref 0–5)

## 2020-10-29 ENCOUNTER — TELEPHONE (OUTPATIENT)
Dept: OBGYN CLINIC | Facility: HOSPITAL | Age: 75
End: 2020-10-29

## 2020-11-04 DIAGNOSIS — G47.09 OTHER INSOMNIA: ICD-10-CM

## 2020-11-04 RX ORDER — NORTRIPTYLINE HYDROCHLORIDE 50 MG/1
CAPSULE ORAL
Qty: 90 CAPSULE | Refills: 0 | Status: SHIPPED | OUTPATIENT
Start: 2020-11-04 | End: 2021-02-08

## 2020-11-06 ENCOUNTER — TELEPHONE (OUTPATIENT)
Dept: INTERNAL MEDICINE CLINIC | Facility: CLINIC | Age: 75
End: 2020-11-06

## 2020-11-12 ENCOUNTER — TELEPHONE (OUTPATIENT)
Dept: INTERNAL MEDICINE CLINIC | Facility: CLINIC | Age: 75
End: 2020-11-12

## 2020-11-16 ENCOUNTER — OFFICE VISIT (OUTPATIENT)
Dept: OBGYN CLINIC | Facility: OTHER | Age: 75
End: 2020-11-16

## 2020-11-16 VITALS
DIASTOLIC BLOOD PRESSURE: 83 MMHG | SYSTOLIC BLOOD PRESSURE: 170 MMHG | WEIGHT: 97 LBS | BODY MASS INDEX: 20.36 KG/M2 | HEIGHT: 58 IN | HEART RATE: 74 BPM

## 2020-11-16 DIAGNOSIS — Z96.611 AFTERCARE FOLLOWING RIGHT SHOULDER JOINT REPLACEMENT SURGERY: Primary | ICD-10-CM

## 2020-11-16 DIAGNOSIS — Z47.1 AFTERCARE FOLLOWING RIGHT SHOULDER JOINT REPLACEMENT SURGERY: Primary | ICD-10-CM

## 2020-11-16 PROCEDURE — 99024 POSTOP FOLLOW-UP VISIT: CPT | Performed by: PHYSICIAN ASSISTANT

## 2020-11-16 RX ORDER — A/SINGAPORE/GP1908/2015 IVR-180 (AN A/MICHIGAN/45/2015 (H1N1)PDM09-LIKE VIRUS, A/HONG KONG/4801/2014, NYMC X-263B (H3N2) (AN A/HONG KONG/4801/2014-LIKE VIRUS), AND B/BRISBANE/60/2008, WILD TYPE (A B/BRISBANE/60/2008-LIKE VIRUS) 15; 15; 15 UG/.5ML; UG/.5ML; UG/.5ML
INJECTION, SUSPENSION INTRAMUSCULAR
COMMUNITY
Start: 2020-10-12 | End: 2021-05-20 | Stop reason: ALTCHOICE

## 2020-11-18 ENCOUNTER — TELEPHONE (OUTPATIENT)
Dept: INTERNAL MEDICINE CLINIC | Facility: CLINIC | Age: 75
End: 2020-11-18

## 2020-12-07 DIAGNOSIS — E03.9 HYPOTHYROIDISM, UNSPECIFIED TYPE: ICD-10-CM

## 2020-12-07 RX ORDER — LEVOTHYROXINE SODIUM 0.03 MG/1
TABLET ORAL
Qty: 90 TABLET | Refills: 1 | Status: ON HOLD | OUTPATIENT
Start: 2020-12-07 | End: 2021-06-02

## 2020-12-09 ENCOUNTER — TELEPHONE (OUTPATIENT)
Dept: INTERNAL MEDICINE CLINIC | Facility: CLINIC | Age: 75
End: 2020-12-09

## 2020-12-10 ENCOUNTER — TELEPHONE (OUTPATIENT)
Dept: INTERNAL MEDICINE CLINIC | Facility: CLINIC | Age: 75
End: 2020-12-10

## 2020-12-22 ENCOUNTER — TELEPHONE (OUTPATIENT)
Dept: INTERNAL MEDICINE CLINIC | Facility: CLINIC | Age: 75
End: 2020-12-22

## 2020-12-26 DIAGNOSIS — G89.4 CHRONIC PAIN SYNDROME: ICD-10-CM

## 2020-12-28 RX ORDER — GABAPENTIN 300 MG/1
CAPSULE ORAL
Qty: 270 CAPSULE | Refills: 1 | Status: SHIPPED | OUTPATIENT
Start: 2020-12-28 | End: 2021-09-20 | Stop reason: SDUPTHER

## 2021-01-06 ENCOUNTER — TELEPHONE (OUTPATIENT)
Dept: INTERNAL MEDICINE CLINIC | Facility: CLINIC | Age: 76
End: 2021-01-06

## 2021-01-06 NOTE — TELEPHONE ENCOUNTER
Home health form to be signed by an attending, form given to Dr Vira Melchor for signature  Once completed please call Judy Shepard at [phone] to  forms in person

## 2021-01-06 NOTE — TELEPHONE ENCOUNTER
Folder Color- Blue    Name of Form- Physician Order #[id number]    Form to be filled out by- Iain Nunez     Form to be Faxed [phone]    Patient made aware of 10 business day policy

## 2021-01-06 NOTE — TELEPHONE ENCOUNTER
Folder Color- Blue    Name of Form- Physician Order #[id number]    Form to be filled out by- Harvinder Guerrero    Form to be Faxed [phone]  Patient made aware of 10 business day policy

## 2021-01-06 NOTE — TELEPHONE ENCOUNTER
Home health form to be signed by an attending, form given to Dr Saji Krishnamurthy for signature  Also gave form Order# [id number]  Once completed please call Elsa Tee at [phone] to  forms in person

## 2021-01-11 ENCOUNTER — TELEPHONE (OUTPATIENT)
Dept: INTERNAL MEDICINE CLINIC | Facility: CLINIC | Age: 76
End: 2021-01-11

## 2021-01-11 NOTE — TELEPHONE ENCOUNTER
Folder Color- blue    Name of Form- [address] [id number]     Form to be filled out byValene Mention and Attending    Form to be Faxed to [phone]

## 2021-01-12 NOTE — TELEPHONE ENCOUNTER
Faxes not going through  I scanned form into chart  Mailed original to [address] [place] [address]

## 2021-01-28 ENCOUNTER — OFFICE VISIT (OUTPATIENT)
Dept: OBGYN CLINIC | Facility: OTHER | Age: 76
End: 2021-01-28
Payer: MEDICARE

## 2021-01-28 ENCOUNTER — APPOINTMENT (OUTPATIENT)
Dept: RADIOLOGY | Facility: OTHER | Age: 76
End: 2021-01-28
Payer: MEDICARE

## 2021-01-28 VITALS
SYSTOLIC BLOOD PRESSURE: 163 MMHG | BODY MASS INDEX: 20.65 KG/M2 | HEIGHT: 58 IN | WEIGHT: 98.4 LBS | HEART RATE: 73 BPM | DIASTOLIC BLOOD PRESSURE: 88 MMHG

## 2021-01-28 DIAGNOSIS — S46.011D TRAUMATIC ROTATOR CUFF TEAR, RIGHT, SUBSEQUENT ENCOUNTER: Primary | ICD-10-CM

## 2021-01-28 DIAGNOSIS — S46.011D TRAUMATIC ROTATOR CUFF TEAR, RIGHT, SUBSEQUENT ENCOUNTER: ICD-10-CM

## 2021-01-28 DIAGNOSIS — M19.012 OSTEOARTHRITIS OF GLENOHUMERAL JOINT, LEFT: ICD-10-CM

## 2021-01-28 DIAGNOSIS — Z96.611 STATUS POST REVERSE TOTAL ARTHROPLASTY OF RIGHT SHOULDER: ICD-10-CM

## 2021-01-28 PROCEDURE — 99213 OFFICE O/P EST LOW 20 MIN: CPT | Performed by: PHYSICIAN ASSISTANT

## 2021-01-28 PROCEDURE — 73030 X-RAY EXAM OF SHOULDER: CPT

## 2021-01-28 PROCEDURE — 20610 DRAIN/INJ JOINT/BURSA W/O US: CPT | Performed by: PHYSICIAN ASSISTANT

## 2021-01-28 RX ORDER — LIDOCAINE HYDROCHLORIDE 10 MG/ML
2 INJECTION, SOLUTION INFILTRATION; PERINEURAL
Status: COMPLETED | OUTPATIENT
Start: 2021-01-28 | End: 2021-01-28

## 2021-01-28 RX ORDER — BETAMETHASONE SODIUM PHOSPHATE AND BETAMETHASONE ACETATE 3; 3 MG/ML; MG/ML
6 INJECTION, SUSPENSION INTRA-ARTICULAR; INTRALESIONAL; INTRAMUSCULAR; SOFT TISSUE
Status: COMPLETED | OUTPATIENT
Start: 2021-01-28 | End: 2021-01-28

## 2021-01-28 RX ADMIN — LIDOCAINE HYDROCHLORIDE 2 ML: 10 INJECTION, SOLUTION INFILTRATION; PERINEURAL at 14:36

## 2021-01-28 RX ADMIN — BETAMETHASONE SODIUM PHOSPHATE AND BETAMETHASONE ACETATE 6 MG: 3; 3 INJECTION, SUSPENSION INTRA-ARTICULAR; INTRALESIONAL; INTRAMUSCULAR; SOFT TISSUE at 14:36

## 2021-01-28 NOTE — PROGRESS NOTES
Assessment  Diagnoses and all orders for this visit:    Traumatic rotator cuff tear, right, subsequent encounter  -     XR shoulder 2+ vw right; Future    Status post reverse total arthroplasty of right shoulder  -     XR shoulder 2+ vw right; Future    Osteoarthritis of glenohumeral joint, left        Discussion and Plan:    1  Slowly increase activities to tolerance for her right shoulder  X-rays look good  Passive motion is great  Hopefully with more time her symptoms will continue to improve  2  Left shoulder aspiration of 45 ml of clear yellow fluid  3  Left shoulder steroid injection for pain relief  4  Follow up as needed  Filomena Han knows to call the office if the effusion returns for her left shoulder  If Filomena Han returns for the left shoulder, dedicated left shoulder x-rays should be obtained  Subjective:   Patient ID: Michaela Hopkins is a 67 y o  female      Filomena Han presents in follow up of the right shoulder  She has not been seen in several months  She did not do outpatient physical therapy but has been working with home therapy  She was very happy with her home therapist and has been using her pulleys 2-3 times a week as instructed  She reports bilateral shoulder pain, neck pain and radicular symptoms down her right chest  She sees Dr Sri Tillman for her RA  She denies injury or trauma  Filomenaceleste Han also complaining of left shoulder pain  She has noticed some swelling over the anterior aspect of the left shoulder  States swelling started a few moths ago  Pain occurs with motion  Pain improves with rest   Denies injury or trauma  Denies numbness or tingling  The following portions of the patient's history were reviewed and updated as appropriate: allergies, current medications, past family history, past medical history, past social history, past surgical history and problem list     Review of Systems   Constitutional: Negative for chills and fever  HENT: Negative for hearing loss      Eyes: Negative for visual disturbance  Respiratory: Negative for shortness of breath  Cardiovascular: Negative for chest pain  Gastrointestinal: Negative for abdominal pain  Musculoskeletal:        As reviewed in the HPI   Skin: Negative for rash  Neurological:        As reviewed in the HPI   Psychiatric/Behavioral: Negative for agitation  Objective:  /88   Pulse 73   Ht 4' 10" (1 473 m)   Wt 44 6 kg (98 lb 6 4 oz)   BMI 20 57 kg/m²       Right Shoulder Exam     Tenderness   The patient is experiencing no tenderness  Range of Motion   Passive abduction: 90   Right shoulder forward flexion: passive 145  Internal rotation 0 degrees: Lumbar     Tests   Jacob test: negative  Impingement: negative    Other   Erythema: absent  Scars: present (healed incision without erythema or drainage)  Sensation: normal  Pulse: present      Left Shoulder Exam     Range of Motion   The patient has normal left shoulder ROM  Other   Erythema: absent  Scars: absent  Sensation: normal  Pulse: present     Comments:  Moderate effusion             Physical Exam  Constitutional:       Appearance: She is well-developed  HENT:      Head: Normocephalic  Neck:      Musculoskeletal: Normal range of motion  Pulmonary:      Breath sounds: Normal breath sounds  No wheezing  Skin:     General: Skin is warm and dry  Neurological:      Mental Status: She is alert and oriented to person, place, and time  Psychiatric:         Behavior: Behavior normal          Thought Content: Thought content normal          Judgment: Judgment normal              I have personally reviewed pertinent films in PACS and my interpretation is as follows  4 views of the right shoulder:no fx or dislocation; no hardware complication; scapular notching    Large joint arthrocentesis: L glenohumeral  Universal Protocol:  Consent: Verbal consent obtained    Consent given by: patient    Supporting Documentation  Indications: pain   Procedure Details  Location: shoulder - L glenohumeral  Needle size: 18 G  Ultrasound guidance: no  Approach: anterior  Medications administered: 6 mg betamethasone acetate-betamethasone sodium phosphate 6 (3-3) mg/mL; 2 mL lidocaine 1 %    Aspirate amount: 45 mL  Aspirate: clear and yellow    Patient tolerance: patient tolerated the procedure well with no immediate complications  Dressing:  Sterile dressing applied

## 2021-01-28 NOTE — PATIENT INSTRUCTIONS
CORTICOSTEROID INJECTION  What is a corticosteroid? Injuries or disease such as arthritis, bursitis or tendonitis result in inflammation  In turn, this inflammation can cause swelling and pain  A local injection of a corticosteroid is provided to diminish inflammation  By doing so, it will also decrease pain and swelling which is making you uncomfortable  Is this the same thing as a Cortisone Injection? Cortisone® is a brand name of a corticosteroid used commonly in the past   Today I commonly use a more water-soluble corticosteroid named Celestone®  Will the injection hurt? As with any injection, you may feel pain at the time of the injection  Typically, I use a local anesthetic (Garcia Bussing) in addition to the corticosteroid to determine if the injection has been placed in the appropriate location  Hence it is important to monitor your symptoms 4-6 hours after the injection, as the area will be anesthetized (numb) while the local anesthetic is working  Once the local anesthetic wears off, the intensity of pain can be the same as it was prior to the injection, or even worse  This does not mean that the injection is not working  The corticosteroid may take 24-72 hours to begin having a positive effect  If you do experience an increase in pain, the use of an ice pack on the area for 20 minutes at a time should help  It is also helpful to take an oral anti-inflammatory such as Tylenol® or Motrin® if you are able to medically do so  For this reason it is best to avoid activities that put stress on the area the first 24 hours after the injection  How long will pain relief last?  This will vary according to the type and severity of the symptoms being treated and the severity of the condition  Symptom relief may last weeks to months  I typically couple injections with physical therapy so that the underlying problem causing the inflammation may be treated as the pain diminishes    If the combination is not successful, you may be a surgical candidate  I have read bad things about steroids  Will these things happen to me? Corticosteroids, when utilized properly, are safe and effective drugs  When used in a low dose, potential adverse reactions are very rare  Some patients may experience a sensation of flushing for several days  Very rarely, there can be a local reaction which may include increased discomfort for a period of time in the areas that has been injected  A steroid should not be used over and over again  Multiple injections in the same area can produce adverse effects such as tissue atrophy and degeneration of tendon or cartilage  A small percentage of patients (< 0 1%) may develop an infection in the joint after injection  This is a treatable problem, but if neglected, may result in permanent disability  Signs of infection include redness, swelling, discharge, fevers, increasing pain and drainage from the injection site  This represents an emergency and you should contact our office immediately or seek treatment in the ER if after hours  If I have diabetes, will this injection affect me? If you are diabetic, an injection of a corticosteroid can raise your blood sugar level, requiring more insulin for a brief period of time  This may necessitate careful blood sugar maintenance  If the elevated sugars are not able to be controlled, contact your diabetic doctor for guidance

## 2021-02-04 DIAGNOSIS — G47.09 OTHER INSOMNIA: ICD-10-CM

## 2021-02-08 RX ORDER — NORTRIPTYLINE HYDROCHLORIDE 50 MG/1
CAPSULE ORAL
Qty: 90 CAPSULE | Refills: 0 | Status: SHIPPED | OUTPATIENT
Start: 2021-02-08 | End: 2021-05-04

## 2021-02-09 ENCOUNTER — TELEPHONE (OUTPATIENT)
Dept: INTERNAL MEDICINE CLINIC | Facility: CLINIC | Age: 76
End: 2021-02-09

## 2021-03-17 ENCOUNTER — OFFICE VISIT (OUTPATIENT)
Dept: OBGYN CLINIC | Facility: OTHER | Age: 76
End: 2021-03-17
Payer: MEDICARE

## 2021-03-17 ENCOUNTER — APPOINTMENT (OUTPATIENT)
Dept: RADIOLOGY | Facility: OTHER | Age: 76
End: 2021-03-17
Payer: MEDICARE

## 2021-03-17 VITALS
WEIGHT: 98 LBS | DIASTOLIC BLOOD PRESSURE: 84 MMHG | BODY MASS INDEX: 20.48 KG/M2 | SYSTOLIC BLOOD PRESSURE: 158 MMHG | HEART RATE: 79 BPM

## 2021-03-17 DIAGNOSIS — M25.512 LEFT SHOULDER PAIN, UNSPECIFIED CHRONICITY: ICD-10-CM

## 2021-03-17 DIAGNOSIS — M06.9 RHEUMATOID ARTHRITIS INVOLVING LEFT SHOULDER, UNSPECIFIED WHETHER RHEUMATOID FACTOR PRESENT (HCC): Primary | ICD-10-CM

## 2021-03-17 PROCEDURE — 73030 X-RAY EXAM OF SHOULDER: CPT

## 2021-03-17 PROCEDURE — 99213 OFFICE O/P EST LOW 20 MIN: CPT | Performed by: ORTHOPAEDIC SURGERY

## 2021-03-17 NOTE — PROGRESS NOTES
Orthopaedic Surgery - Office Note  Larry Rivera (67 y o  female)   : 1945   MRN: [id number]  Encounter Date: 3/17/2021    Chief Complaint   Patient presents with    Left Shoulder - Pain       Assessment / Plan  Left shoulder pain 2/2 end stage rheumatoid arthritis     · Thorough discussion had with patient regarding plan of care for the left shoulder  Unfortunately, she has not responded well to non-surgical management and would qualify for total shoulder replacement if that is her wish  We would be happy to assume her care, or she may follow-up with Dr Leidy Ramsey  · CT of left  shoulder  · Activity as tolerated  · Continue outpatient PT  · Anti-inflammatories or Tylenol prn pain  · No follow-ups on file  History of Present Illness  Rupert Baker is a 67 y o  female who presents for new evaluation of left shoulder pain  Patient has a history of rheumatoid arthritis which is managed with Plaquenil, methotrexate and prednisone  She also has a history of right rTSA performed by Dr Leidy Ramsey in   Pain has been present for several years  It is worse with overhead motion, increased activity and is associated with significant swelling  She had the shoulder aspirated and injected with steroid on 21, but this provided little relief  Has been performing PT in conjunction with her right shoulder  Denies numbness or tingling to the extremity  Review of Systems  Pertinent items are noted in HPI  All other systems were reviewed and are negative  Physical Exam  /84   Pulse 79   Wt 44 5 kg (98 lb)   BMI 20 48 kg/m²   Cons: Appears well  No apparent distress  Psych: Alert  Oriented x3  Mood and affect normal   Eyes: PERRLA, EOMI  Resp: Normal effort  No audible wheezing or stridor  CV: Palpable pulse  No discernable arrhythmia  No LE edema  Lymph:  No palpable cervical, axillary, or inguinal lymphadenopathy  Skin: Warm  No palpable masses  No visible lesions    Neuro: Normal muscle tone  Normal and symmetric DTR's  Left Shoulder Exam  Alignment / Posture:  Normal shoulder posture  Inspection:  Significant anterior swelling  No edema  No erythema  No ecchymosis  No deformity  Palpation:  Moderate tenderness at lateral shoulder  Large, palpable effusion  ROM:  Shoulder   Shoulder ER 40  Shoulder IR L5  Strength:  Supraspinatus 4+/5  Infraspinatus 4+/5  Subscapularis 5/5  Stability:  No objective shoulder instability  Tests: (+) Jacob  (+) Neer  (-) Neer  (-) Bear hug  Neurovascular:  Sensation intact in Ax/R/M/U nerve distributions  Fingers warm and perfused  Studies Reviewed  XR of left shoulder - Severe GH arthritis with significant anterior glenoid erosion     Procedures  No procedures today  Medical, Surgical, Family, and Social History  The patient's medical history, family history, and social history, were reviewed and updated as appropriate      Past Medical History:   Diagnosis Date    Anxiety     Depression     Disease of thyroid gland     HYPO    Femur neck fracture (HCC)     GERD (gastroesophageal reflux disease)     Hyperthyroidism     RESOLVED: [id number]    Osteoporosis     Polio     PVD (peripheral vascular disease) ([facility] [address] )     Right BBB/left ant fasc block     UTI (urinary tract infection)     Varicella infection     LAST ASSESSED: [id number]       Past Surgical History:   Procedure Laterality Date    APPENDECTOMY      HIP ARTHROPLASTY Left 11/27/2017    Procedure: REMOVAL IM NAIL CONVERSION TO TOTAL HIP ARTHROPLASTY POSTERIOR APPROACH;  Surgeon: Brielle Hammonds MD;  [facility];  Service: Orthopedics    HIP FRACTURE SURGERY      HIP SURGERY      both hips replaced;2013 left ,2014 right    JOINT REPLACEMENT Right     HIP TOTAL    VT CONV PREV HIP SURG TO TOT HIP [place] Left 10/4/2017    Procedure: Art [employer] removal of left hip;  Surgeon: Brielle Hammonds MD;  [facility];  Service: Orthopedics    VT RECONSTR TOTAL SHOULDER IMPLANT Right 9/2/2020    Procedure: ARTHROPLASTY SHOULDER REVERSE;  Surgeon: Pepito Javier MD;  [facility];  Service: Orthopedics    REVISION TOTAL HIP ARTHROPLASTY Right     TONSILLECTOMY         Family History   Problem Relation Age of Onset    Rheum arthritis Mother     Rheum arthritis Sister     Prostate cancer Brother        Social History     Occupational History    Occupation: RETIRED    Tobacco Use    Smoking status: Former Smoker    Smokeless tobacco: Never Used    Tobacco comment: quit 20-30 years ago   Substance and Sexual Activity    Alcohol use: Not Currently     Frequency: Never    Drug use: Not Currently    Sexual activity: Not Currently       No Known Allergies      Current Outpatient Medications:     acetaminophen (TYLENOL) 325 mg tablet, Take prn for mild pain, Disp: 30 tablet, Rfl: 0    Ascorbic Acid (VITAMIN C) 1000 MG tablet, Take 1,000 mg by mouth daily, Disp: , Rfl:     cholecalciferol (VITAMIN D3) 1,000 units tablet, Take 1,000 Units by mouth daily, Disp: , Rfl:     docusate sodium (COLACE) 100 mg capsule, Take 1 capsule (100 mg total) by mouth 2 (two) times a day, Disp: 10 capsule, Rfl: 0    famotidine (PEPCID) 20 mg tablet, Take 1 tablet (20 mg total) by mouth 2 (two) times a day, Disp: 90 tablet, Rfl: 1    Fluad Quadrivalent 0 5 ML PRSY, inject 0 5 milliliters intramuscularly, Disp: , Rfl:     folic acid (FOLVITE) 1 mg tablet, TAKE ONE TABLET BY MOUTH EVERY DAY, Disp: 30 tablet, Rfl: 2    gabapentin (NEURONTIN) 300 mg capsule, TAKE ONE CAPSULE BY MOUTH THREE TIMES A DAY, Disp: 270 capsule, Rfl: 1    hydroxychloroquine (PLAQUENIL) 200 mg tablet, 1 pill am 1/2 pill pm, Disp: , Rfl:     ibuprofen (MOTRIN) 200 mg tablet, Take by mouth every 6 (six) hours as needed for mild pain, Disp: , Rfl:     levothyroxine 25 mcg tablet, TAKE ONE TABLET BY MOUTH EVERY DAY, Disp: 90 tablet, Rfl: 1    methotrexate 2 5 MG tablet, 3 tablets by mouth once a week on Thursday, Disp:  , Rfl:     Multiple Vitamins-Minerals (CENTRUM SILVER PO), Take 1 tablet by mouth daily, Disp: , Rfl:     nortriptyline (PAMELOR) 50 mg capsule, TAKE 1 CAPSULE BY MOUTH AT BEDTIME, Disp: 90 capsule, Rfl: 0    polyethylene glycol (MIRALAX) 17 g packet, Take 17 g by mouth daily, Disp: 14 each, Rfl: 0    predniSONE 10 mg tablet, Take by mouth daily, Disp: , Rfl:     predniSONE 5 mg tablet, Take 5 mg by mouth daily, Disp: , Rfl:     Psyllium (METAMUCIL FIBER PO), Take 1 packet by mouth 3 (three) times a day  , Disp: , Rfl:     Vitamin E 400 units TABS, Take 400 Units by mouth daily  , Disp: , Rfl:       Cruz Miner MD    Scribe Attestation    I,:   am acting as a scribe while in the presence of the attending physician :       I,:   personally performed the services described in this documentation    as scribed in my presence :

## 2021-03-26 ENCOUNTER — HOSPITAL ENCOUNTER (OUTPATIENT)
Dept: RADIOLOGY | Age: 76
Discharge: HOME | End: 2021-03-26
Payer: MEDICARE

## 2021-03-26 DIAGNOSIS — M06.9 RHEUMATOID ARTHRITIS INVOLVING LEFT SHOULDER, UNSPECIFIED WHETHER RHEUMATOID FACTOR PRESENT (HCC): ICD-10-CM

## 2021-03-26 PROCEDURE — G1004 CDSM NDSC: HCPCS

## 2021-03-26 PROCEDURE — 73200 CT UPPER EXTREMITY W/O DYE: CPT

## 2021-03-30 DIAGNOSIS — Z23 ENCOUNTER FOR IMMUNIZATION: ICD-10-CM

## 2021-04-09 ENCOUNTER — IMMUNIZATIONS (OUTPATIENT)
Dept: FAMILY MEDICINE CLINIC | Facility: HOSPITAL | Age: 76
End: 2021-04-09

## 2021-04-09 DIAGNOSIS — Z23 ENCOUNTER FOR IMMUNIZATION: Primary | ICD-10-CM

## 2021-04-09 PROCEDURE — 91300 SARS-COV-2 / COVID-19 MRNA VACCINE (PFIZER-BIONTECH) 30 MCG: CPT

## 2021-04-09 PROCEDURE — 0001A SARS-COV-2 / COVID-19 MRNA VACCINE (PFIZER-BIONTECH) 30 MCG: CPT

## 2021-04-12 ENCOUNTER — APPOINTMENT (OUTPATIENT)
Dept: LAB | Facility: HOSPITAL | Age: 76
End: 2021-04-12
Attending: INTERNAL MEDICINE
Payer: MEDICARE

## 2021-04-12 ENCOUNTER — TRANSCRIBE ORDERS (OUTPATIENT)
Dept: LAB | Facility: HOSPITAL | Age: 76
End: 2021-04-12

## 2021-04-12 DIAGNOSIS — M32.9 SYSTEMIC LUPUS ERYTHEMATOSUS, UNSPECIFIED SLE TYPE, UNSPECIFIED ORGAN INVOLVEMENT STATUS (HCC): Primary | ICD-10-CM

## 2021-04-12 DIAGNOSIS — M32.9 SYSTEMIC LUPUS ERYTHEMATOSUS, UNSPECIFIED SLE TYPE, UNSPECIFIED ORGAN INVOLVEMENT STATUS (HCC): ICD-10-CM

## 2021-04-12 LAB
ALBUMIN SERPL BCP-MCNC: 3.5 G/DL (ref 3.5–5)
ALP SERPL-CCNC: 79 U/L (ref 46–116)
ALT SERPL W P-5'-P-CCNC: 23 U/L (ref 12–78)
AST SERPL W P-5'-P-CCNC: 23 U/L (ref 5–45)
BASOPHILS # BLD AUTO: 0.02 THOUSANDS/ΜL (ref 0–0.1)
BASOPHILS NFR BLD AUTO: 0 % (ref 0–1)
BILIRUB DIRECT SERPL-MCNC: 0.11 MG/DL (ref 0–0.2)
BILIRUB SERPL-MCNC: 0.51 MG/DL (ref 0.2–1)
CREAT SERPL-MCNC: 0.59 MG/DL (ref 0.6–1.3)
CRP SERPL QL: <3 MG/L
EOSINOPHIL # BLD AUTO: 0.05 THOUSAND/ΜL (ref 0–0.61)
EOSINOPHIL NFR BLD AUTO: 1 % (ref 0–6)
ERYTHROCYTE [DISTWIDTH] IN BLOOD BY AUTOMATED COUNT: 14.8 % (ref 11.6–15.1)
ERYTHROCYTE [SEDIMENTATION RATE] IN BLOOD: 15 MM/HOUR (ref 0–29)
GFR SERPL CREATININE-BSD FRML MDRD: 89 ML/MIN/1.73SQ M
HCT VFR BLD AUTO: 37.7 % (ref 34.8–46.1)
HGB BLD-MCNC: 11.8 G/DL (ref 11.5–15.4)
IMM GRANULOCYTES # BLD AUTO: 0.02 THOUSAND/UL (ref 0–0.2)
IMM GRANULOCYTES NFR BLD AUTO: 0 % (ref 0–2)
LYMPHOCYTES # BLD AUTO: 1.22 THOUSANDS/ΜL (ref 0.6–4.47)
LYMPHOCYTES NFR BLD AUTO: 19 % (ref 14–44)
MCH RBC QN AUTO: 29.9 PG (ref 26.8–34.3)
MCHC RBC AUTO-ENTMCNC: 31.3 G/DL (ref 31.4–37.4)
MCV RBC AUTO: 96 FL (ref 82–98)
MONOCYTES # BLD AUTO: 0.63 THOUSAND/ΜL (ref 0.17–1.22)
MONOCYTES NFR BLD AUTO: 10 % (ref 4–12)
NEUTROPHILS # BLD AUTO: 4.49 THOUSANDS/ΜL (ref 1.85–7.62)
NEUTS SEG NFR BLD AUTO: 70 % (ref 43–75)
NRBC BLD AUTO-RTO: 0 /100 WBCS
PLATELET # BLD AUTO: 312 THOUSANDS/UL (ref 149–390)
PMV BLD AUTO: 8.4 FL (ref 8.9–12.7)
PROT SERPL-MCNC: 7 G/DL (ref 6.4–8.2)
RBC # BLD AUTO: 3.94 MILLION/UL (ref 3.81–5.12)
WBC # BLD AUTO: 6.43 THOUSAND/UL (ref 4.31–10.16)

## 2021-04-12 PROCEDURE — 86140 C-REACTIVE PROTEIN: CPT

## 2021-04-12 PROCEDURE — 80076 HEPATIC FUNCTION PANEL: CPT

## 2021-04-12 PROCEDURE — 36415 COLL VENOUS BLD VENIPUNCTURE: CPT

## 2021-04-12 PROCEDURE — 85025 COMPLETE CBC W/AUTO DIFF WBC: CPT

## 2021-04-12 PROCEDURE — 85652 RBC SED RATE AUTOMATED: CPT

## 2021-04-12 PROCEDURE — 82565 ASSAY OF CREATININE: CPT

## 2021-04-19 ENCOUNTER — OFFICE VISIT (OUTPATIENT)
Dept: OBGYN CLINIC | Facility: OTHER | Age: 76
End: 2021-04-19
Payer: MEDICARE

## 2021-04-19 VITALS
HEART RATE: 81 BPM | HEIGHT: 58 IN | BODY MASS INDEX: 20.57 KG/M2 | WEIGHT: 98 LBS | SYSTOLIC BLOOD PRESSURE: 167 MMHG | DIASTOLIC BLOOD PRESSURE: 80 MMHG

## 2021-04-19 DIAGNOSIS — S43.002A ACQUIRED SUBLUXATION OF LEFT SHOULDER, INITIAL ENCOUNTER: ICD-10-CM

## 2021-04-19 DIAGNOSIS — M75.102 ROTATOR CUFF TEAR ARTHROPATHY OF LEFT SHOULDER: Primary | ICD-10-CM

## 2021-04-19 DIAGNOSIS — M12.812 ROTATOR CUFF TEAR ARTHROPATHY OF LEFT SHOULDER: Primary | ICD-10-CM

## 2021-04-19 PROCEDURE — 99214 OFFICE O/P EST MOD 30 MIN: CPT | Performed by: ORTHOPAEDIC SURGERY

## 2021-04-19 RX ORDER — CHLORHEXIDINE GLUCONATE 0.12 MG/ML
15 RINSE ORAL ONCE
Status: CANCELLED | OUTPATIENT
Start: 2021-04-19 | End: 2021-04-19

## 2021-04-19 NOTE — PROGRESS NOTES
Assessment  Diagnoses and all orders for this visit:     rotator cuff tear arthropathy left shoulder     anterior subluxation left glenohumeral joint        Discussion and Plan:    ·  the patient has a painful dysfunctional left shoulder secondary to rotator cuff tear arthropathy and subsequent anterior subluxation with anterior glenoid bone loss  Given her persistent symptoms despite aspirations injections, activity modification and time she is indicated for left reverse total shoulder arthroplasty as she was counseled by my colleague Dr Jinny Parsons  I did perform a right reverse total shoulder arthroplasty for her in the past and she is happy with those results and states is much better on the right than it was before so she does wish to proceed forward with this procedure on the left  We did discuss this is something she stays overnight in the hospital and we would anticipate discharge to home so her family will work on preparing the house for her discharge so that she can avoid any rehabilitation facilities  · A thorough discussion was performed with the patient reviewing all operative and nonoperative options as well as the risks of the procedure  Risks discussed include but not limited to persistent pain, dislocation, loosening of the prosthesis, infection, need for further surgery including revision to a reverse total shoulder, rotator cuff rupture , neurovascular injury, as well as the risk of anesthesia  After this discussion all questions were answered and informed consent was obtained for Reverse Total Shoulder Arthroplasty of the left shoulder  · Follow up post operatively with Atul Belcher PA-C    Subjective:   Patient ID: Mary Grace is a 67 y o  female      HPI  66 y/o female who presents today for a follow up visit for her left shoulder  She continues to note significant pain about the left shoulder on a daily basis, especially with her daily activities and at night   She states that the pain is affecting her daily activities  She notes only transient benefit from her recent aspiration and CS injection performed on 1/28/2021  Her pain also reports that she is awakening at night due to the pain  History of a right reverse total shoulder performed by myself in 9/2020 and is doing well  Denies numbness and tingling, fevers or chills  The following portions of the patient's history were reviewed and updated as appropriate: allergies, current medications, past family history, past medical history, past social history, past surgical history and problem list     Review of Systems   Constitutional: Negative for chills, fever and unexpected weight change  HENT: Negative for hearing loss, nosebleeds and sore throat  Eyes: Negative for pain, redness and visual disturbance  Respiratory: Negative for cough, shortness of breath and wheezing  Cardiovascular: Negative for chest pain, palpitations and leg swelling  Gastrointestinal: Negative for abdominal distention, nausea and vomiting  Endocrine: Negative for polydipsia and polyuria  Genitourinary: Negative for dysuria and hematuria  Skin: Negative for rash and wound  Neurological: Negative for dizziness, numbness and headaches  Psychiatric/Behavioral: Negative for decreased concentration and suicidal ideas  Objective:  /80   Pulse 81   Ht 4' 10" (1 473 m)   Wt 44 5 kg (98 lb)   BMI 20 48 kg/m²       Left Shoulder Exam     Tenderness   Left shoulder tenderness location: Diffuse  Range of Motion   External rotation: 40   Forward flexion: 140   Internal rotation 0 degrees: Lumbar     Muscle Strength   Abduction: 3/5     Other   Erythema: absent  Sensation: normal  Pulse: present             Physical Exam  Constitutional:       Appearance: She is well-developed  Eyes:      Pupils: Pupils are equal, round, and reactive to light     Pulmonary:      Effort: Pulmonary effort is normal       Breath sounds: Normal breath sounds  Skin:     General: Skin is warm and dry  Neurological:      Mental Status: She is alert and oriented to person, place, and time  Psychiatric:         Behavior: Behavior normal          Thought Content: Thought content normal          Judgment: Judgment normal            I have personally reviewed pertinent films in PACS and my interpretation is as follows  CT blue print left shoulder 3/26/2021: Severe glenohumeral joint space narrowing, anterior subluxation of the humeral head, and anterior wear of the glenoid rim  Moderate supraspinatus and infraspinatus muscle atrophy  X Ray Left shoulder 3/17/2021: Anterior dislocation of the left glenohumeral joint  Severe glenohumeral joint osteoarthritis       Scribe Attestation    I,:  Laisha Ta am acting as a scribe while in the presence of the attending physician :       I,:  Baljeet Lund MD personally performed the services described in this documentation    as scribed in my presence :

## 2021-04-19 NOTE — PATIENT INSTRUCTIONS
What to Expect Before and After Shoulder Replacement Surgery  You are being scheduled for a shoulder replacement by Dr Shannon Pisano to treat your shoulder condition  Here is some information which may help to answer questions that you may have  Please do not hesitate to reach out to our team to answer questions not addressed here  Before Surgery  You will be contacted the evening prior to your surgery to confirm the scheduled time of the procedure and when to arrive at the hospital    Do not eat or drink anything after midnight the night before your surgery so that the anesthesia can be performed safely  If you have been fitted for a sling in the office prior to the surgery please remember to bring it to the hospital   You will meet the anesthesiologist the morning of the surgery  The surgery is performed under a general anesthetic but they will also offer you a regional block (shot to numb the arm) to help control your post-operative pain as well as with a catheter that is left in place after the block  The catheter is connected to a small pump which will continue to provide numbing medicine and help prolong the pain control from the block  Unfortunately this catheter is not as effective as the initial block, but can still be very helpful in managing the pain  After Surgery and in the Hospital  The shoulder replacement surgery typically takes 60-90 minutes  When surgery is completed, your surgeon will update your family and friends on your condition and progress  You will remain in the recovery room for at least an hour or until the anesthesia has worn off and your blood pressure and pulse are stable  If you have pain, the nurses will give you medication  Once out of surgery, your surgeon will decide on how long you will be using a sling in order to protect and position your shoulder  However, this won't keep you from starting physical therapy    Exercises typically begin on the day after surgery with emphasis on moving the shoulder, wrist, and hand  The physical therapist will be provided with a detailed protocol but typically the first 6 weeks are used to regain range of motion and then strengthening is initiated  Starting strengthening exercises too early may lead to complications  When You Are Discharged from the [address] [place] [address] can expect to be released from the hospital the day after surgery (this may change if you have special needs or medical conditions)  Before you are released, the treatment team (Orthopaedic surgery residents, physician assistants and physical therapists) will talk with you about the importance of limiting any sudden or stressful movements to the arm for several weeks or longer  Activities that involve pushing, pulling, and lifting should not be done until you are given permission from your surgeon  Your First Day at [address] [place] [address] may need help with your daily activities, so it is a good idea to have family and friends prepared to help you  It is okay to remove the sling and let the arm hang at the side so that you can get cleaned and change your clothes  To put on a shirt, place the bad arm in first and then the good arm  Reverse to take it off  Don't forget to wear the sling every night for at least the first month after surgery, and never use your arm to push yourself up in bed or from a chair  The added weight on your shoulder may cause you to re-injure the joint  How to [place] in the First Week  You are encouraged to return to your normal eating and sleeping patterns as soon as possible  It is important for you to be active in order to control your weight and muscle tone  It is ok to increase your activity level and even perform light aerobic exercise (like walking or riding a stationary bike) within the first week or so if you are feeling up to it    If there is concern about these activates best to wait until the first post-operative visit and discuss this with the team    Drew Chen might be able to return to work within several days if you can perform your job while wearing a sling  Consult with your doctor, as this differs from patient to patient  However, if your job requires heavy lifting or climbing, there may be a delay for several months  Until you are seen for your first follow-up visit, please try and keep wound dry  It is okay to shower but try your best to keep the incision out of direct contact with the water (or consider using a waterproof bandage)  If it does gets wet please dry as best as possible afterwards  If you notice any drainage or a foul odor from your incision or your temperature goes above 101 5 degrees, please contact the office  What You Can Expect in the First Month  Your first post-operative appointment will be with Dr Chantal Metz physician assistant (PA) around 2 weeks after the surgery  You skin staples will be removed and X-rays will be obtained at that visit  Please understand that it is quite common to still experience pain at that time but the pain should be steadily improving  Hopefully physical therapy has already begun but if not it will be initiated at this visit and will continue for the 8-12 weeks  At about 12 weeks after surgery you will start a progressive strengthening program  Physical therapy is a deliberate process of not only strengthening your shoulder but also altering how you use your arm  It may be many months before your desired results are achieved, so do not get discouraged  Your shoulder will generally continue to improve steadily up to 6-8 months after surgery  After that point further improvement is very slow; although it has been shown that even after a year or more, activity can increase as muscle strength continues to improve  After the First Month at Home   Because each person heals differently, there are different recovery timelines  An average recovery period typically lasts about between 3-6 months  Talk with your surgeon about which activities will be appropriate for you once you have recovered

## 2021-04-20 ENCOUNTER — OFFICE VISIT (OUTPATIENT)
Dept: INTERNAL MEDICINE CLINIC | Facility: CLINIC | Age: 76
End: 2021-04-20

## 2021-04-20 ENCOUNTER — TELEPHONE (OUTPATIENT)
Dept: INTERNAL MEDICINE CLINIC | Facility: CLINIC | Age: 76
End: 2021-04-20

## 2021-04-20 VITALS
TEMPERATURE: 97.2 F | WEIGHT: 98 LBS | BODY MASS INDEX: 20.48 KG/M2 | HEART RATE: 78 BPM | DIASTOLIC BLOOD PRESSURE: 75 MMHG | SYSTOLIC BLOOD PRESSURE: 135 MMHG | OXYGEN SATURATION: 99 %

## 2021-04-20 DIAGNOSIS — R35.0 URINARY FREQUENCY: ICD-10-CM

## 2021-04-20 DIAGNOSIS — R26.2 AMBULATORY DYSFUNCTION: ICD-10-CM

## 2021-04-20 DIAGNOSIS — M32.19 OTHER SYSTEMIC LUPUS ERYTHEMATOSUS WITH OTHER ORGAN INVOLVEMENT (HCC): ICD-10-CM

## 2021-04-20 DIAGNOSIS — S09.90XA TRAUMATIC INJURY OF HEAD, INITIAL ENCOUNTER: ICD-10-CM

## 2021-04-20 DIAGNOSIS — N30.01 ACUTE CYSTITIS WITH HEMATURIA: ICD-10-CM

## 2021-04-20 DIAGNOSIS — E03.8 HYPOTHYROIDISM DUE TO HASHIMOTO'S THYROIDITIS: Primary | ICD-10-CM

## 2021-04-20 DIAGNOSIS — M05.79 RHEUMATOID ARTHRITIS INVOLVING MULTIPLE SITES WITH POSITIVE RHEUMATOID FACTOR (HCC): ICD-10-CM

## 2021-04-20 DIAGNOSIS — E06.3 HYPOTHYROIDISM DUE TO HASHIMOTO'S THYROIDITIS: Primary | ICD-10-CM

## 2021-04-20 PROBLEM — D62 ACUTE BLOOD LOSS ANEMIA: Status: RESOLVED | Noted: 2020-09-09 | Resolved: 2021-04-20

## 2021-04-20 PROBLEM — Z47.1 AFTERCARE FOLLOWING JOINT REPLACEMENT: Status: RESOLVED | Noted: 2020-09-09 | Resolved: 2021-04-20

## 2021-04-20 LAB
SL AMB  POCT GLUCOSE, UA: NEGATIVE
SL AMB LEUKOCYTE ESTERASE,UA: ABNORMAL
SL AMB POCT BILIRUBIN,UA: NEGATIVE
SL AMB POCT BLOOD,UA: ABNORMAL
SL AMB POCT CLARITY,UA: CLEAR
SL AMB POCT COLOR,UA: YELLOW
SL AMB POCT KETONES,UA: NEGATIVE
SL AMB POCT NITRITE,UA: POSITIVE
SL AMB POCT PH,UA: 6
SL AMB POCT SPECIFIC GRAVITY,UA: 1.01
SL AMB POCT URINE PROTEIN: NEGATIVE
SL AMB POCT UROBILINOGEN: 6.2

## 2021-04-20 PROCEDURE — 87086 URINE CULTURE/COLONY COUNT: CPT | Performed by: PHYSICIAN ASSISTANT

## 2021-04-20 PROCEDURE — 87077 CULTURE AEROBIC IDENTIFY: CPT | Performed by: PHYSICIAN ASSISTANT

## 2021-04-20 PROCEDURE — 81002 URINALYSIS NONAUTO W/O SCOPE: CPT | Performed by: PHYSICIAN ASSISTANT

## 2021-04-20 PROCEDURE — 87186 SC STD MICRODIL/AGAR DIL: CPT | Performed by: PHYSICIAN ASSISTANT

## 2021-04-20 PROCEDURE — 99213 OFFICE O/P EST LOW 20 MIN: CPT | Performed by: PHYSICIAN ASSISTANT

## 2021-04-20 RX ORDER — NITROFURANTOIN 25; 75 MG/1; MG/1
100 CAPSULE ORAL 2 TIMES DAILY
Qty: 10 CAPSULE | Refills: 0 | Status: SHIPPED | OUTPATIENT
Start: 2021-04-20 | End: 2021-04-23

## 2021-04-20 NOTE — TELEPHONE ENCOUNTER
Folder Color- Blue    Name of [address] [place] [address]    Form to be filled out by-Clarita Goncalves     Form to be Faxed [phone]    Patient made aware of 10 business day policy  Patient has an appt for Pre Op Clearance on 05/03/2021  Jacob Madera

## 2021-04-20 NOTE — PROGRESS NOTES
Assessment/Plan: On your visit today we discussed that you had a fall striking her head approximately 1 month ago  We confirmed at the time other than a small bleed on her forehead you did not have any other symptoms  At this time however you are reporting pain in the back area of her head where you hit it as well as feeling a sensation of something moving around in there  Please call and schedule the CT scan  You are also reporting increased urinary frequency for the past 1 week urine dip completed in the office  Positive for infection, antibiotic sent to pharmacy  Call if no improvement in symptoms  We will call if based on culture need to change the treatment  Also as per our discussion I have given you a script to get your thyroid blood test completed at the same time as your other pre-admission testing to make sure your thyroid dose is correct prior to your surgery in June  After your pre-admission testing will get you scheduled for preop clearance at that time  Continue follow up as scheduled with Rheumatologist for Rheumatoid arthritis and Lupus  No problem-specific Assessment & Plan notes found for this encounter  Diagnoses and all orders for this visit:    Hypothyroidism due to Hashimoto's thyroiditis  -     T4, free  -     TSH, 3rd generation    Rheumatoid arthritis involving multiple sites with positive rheumatoid factor (HCC)    Other systemic lupus erythematosus with other organ involvement (HCC)    Ambulatory dysfunction    Urinary frequency  -     POCT urine dip    Traumatic injury of head, initial encounter  -     CT head wo contrast; Future    Acute cystitis with hematuria  -     nitrofurantoin (MACROBID) 100 mg capsule; Take 1 capsule (100 mg total) by mouth 2 (two) times a day for 5 days  -     Urine culture; Future  -     Urine culture          Subjective:      Patient ID: Kemal Ly is a 67 y o  female      Patient was scheduled for routine six-month follow-up however patient does have medical concerns today  She is reporting symptoms of urinary frequency  States started 1 week ago both day and night, no pain, will only be a little but will feel like has to go again right away  No suprapubic or new low back pain  No fever or chills  Also reports a fall striking her head on a dresser but did not seek medical treatment  States happened 1 month ago, states was bending over lost he balance and fell striking the Right side of her head on dresser, states a little bleeding from forehead stopped right away  Did not seek medical treatment at the time  States no pain no N/V or dizziness  States about 1-2 weeks ago started having pain R side of head but not forehead and the pain radiates into her neck  Denies is not a headache  Pain is when move head and neck  Patient however states that it feels like something is moving inside of her head when she moves it  Patient continues to follow with her rheumatologist for her lupus and rheumatoid arthritis as well as osteoporosis  Patient does have some instability in her gait secondary to chronic rheumatologic conditions in deconditioning  Patient walks with the assistance of a rolling walker  Patient continues to follow with Ortho patient is status post right shoulder rotator cuff surgery  Patient has remained symptomatic on the left and is now scheduled for rotator cuff surgery on left in June 2021  Preoperative testing was ordered and patient will be scheduled for preop evaluation after testing is completed  Patient also followed for hypothyroidism has remained euthyroid on current dose of medication          The following portions of the patient's history were reviewed and updated as appropriate: allergies, current medications, past family history, past medical history, past social history, past surgical history and problem list     Review of Systems   Constitutional: Negative for appetite change, chills, diaphoresis and fever  Respiratory: Negative  Negative for cough and shortness of breath  Cardiovascular: Negative  Negative for chest pain  Gastrointestinal: Negative for abdominal pain, constipation and diarrhea  Genitourinary: Positive for frequency and urgency  Negative for dysuria  Musculoskeletal: Positive for arthralgias, joint swelling, myalgias and neck pain  Skin: Negative for rash  Neurological: Negative for dizziness, tremors, seizures, weakness, light-headedness and headaches  Psychiatric/Behavioral: Negative  Objective:      /75 (BP Location: Left arm, Patient Position: Sitting, Cuff Size: Standard)   Pulse 78   Temp (!) 97 2 °F (36 2 °C) (Temporal)   Wt 44 5 kg (98 lb)   SpO2 99%   BMI 20 48 kg/m²          Physical Exam  Constitutional:       General: She is not in acute distress  Eyes:      Extraocular Movements: Extraocular movements intact  Conjunctiva/sclera: Conjunctivae normal       Pupils: Pupils are equal, round, and reactive to light  Neck:      Musculoskeletal: Neck supple  Vascular: No carotid bruit  Cardiovascular:      Rate and Rhythm: Normal rate and regular rhythm  Heart sounds: No murmur  Pulmonary:      Effort: Pulmonary effort is normal       Breath sounds: Normal breath sounds  Abdominal:      General: Bowel sounds are normal       Tenderness: There is no abdominal tenderness  There is no right CVA tenderness or left CVA tenderness  Musculoskeletal:         General: Tenderness and deformity present  Comments: Patient does have a significant number of bony deformities especially in her hands secondary to her rheumatoid arthritis  Patient does have tenderness to palpation right parietal region  No hematoma palpated  No skin changes  No scab or scars  Neurological:      General: No focal deficit present  Mental Status: She is alert        Gait: Gait abnormal    Psychiatric:         Mood and Affect: Mood normal          PHQ-9 Depression Screening    PHQ-9:   Frequency of the following problems over the past two weeks:      Little interest or pleasure in doing things: 0 - not at all  Feeling down, depressed, or hopeless: 0 - not at all  Trouble falling or staying asleep, or sleeping too much: 0 - not at all  Feeling tired or having little energy: 0 - not at all  Poor appetite or overeatin - not at all  Feeling bad about yourself - or that you are a failure or have let yourself or your family down: 0 - not at all  Trouble concentrating on things, such as reading the newspaper or watching television: 0 - not at all  Moving or speaking so slowly that other people could have noticed  Or the opposite - being so fidgety or restless that you have been moving around a lot more than usual: 0 - not at all  Thoughts that you would be better off dead, or of hurting yourself in some way: 0 - not at all  PHQ-2 Score: 0  PHQ-9 Score: 0       Falls Plan of Care: Balance, strength, and gait training instructions were provided  Patient really could benefit from balance and strength training however she has very limited transportation  Will consider the possibility of getting balance and strength training as she will need some physical therapy after her shoulder surgery in 1 month

## 2021-04-20 NOTE — PATIENT INSTRUCTIONS
On your visit today we discussed that you had a fall striking her head approximately 1 month ago  We confirmed at the time other than a small bleed on her forehead you did not have any other symptoms  At this time however you are reporting pain in the back area of her head where you hit it as well as feeling a sensation of something moving around in there  Please call and schedule the CT scan  You are also reporting increased urinary frequency for the past 1 week urine dip completed in the office  Positive for infection, antibiotic sent to pharmacy  Call if no improvement in symptoms  We will call if based on culture need to change the treatment  Also as per our discussion I have given you a script to get your thyroid blood test completed at the same time as your other pre-admission testing to make sure your thyroid dose is correct prior to your surgery in June  After your pre-admission testing will get you scheduled for preop clearance at that time  Continue follow up as scheduled with Rheumatologist for Rheumatoid arthritis and Lupus  Fall Prevention   AMBULATORY CARE:   Fall prevention  includes ways to make your home and other areas safer  It also includes ways you can move more carefully to prevent a fall  Health conditions that cause changes in your blood pressure, vision, or muscle strength and coordination may increase your risk for falls  Medicines may also increase your risk for falls if they make you dizzy, weak, or sleepy  Call 911 or have someone else call if:   · You have fallen and are unconscious  · You have fallen and cannot move part of your body  Contact your healthcare provider if:   · You have fallen and have pain or a headache  · You have questions or concerns about your condition or care  Fall prevention tips:   · Stand or sit up slowly  This may help you keep your balance and prevent falls  · Use assistive devices as directed    Your healthcare provider may suggest that you use a cane or walker to help you keep your balance  You may need to have grab bars put in your bathroom near the toilet or in the shower  · Wear shoes that fit well and have soles that   Wear shoes both inside and outside  Use slippers with good   Do not wear shoes with high heels  · Wear a personal alarm  This is a device that allows you to call 911 if you fall and need help  Ask your healthcare provider for more information  · Stay active  Exercise can help strengthen your muscles and improve your balance  Your healthcare provider may recommend water aerobics or walking  He or she may also recommend physical therapy to improve your coordination  Never start an exercise program without talking to your healthcare provider first          · Manage your medical conditions  Keep all appointments with your healthcare providers  Visit your eye doctor as directed  Home safety tips:       · Add items to prevent falls in the bathroom  Put nonslip strips on your bath or shower floor to prevent you from slipping  Use a bath mat if you do not have carpet in the bathroom  This will prevent you from falling when you step out of the bath or shower  Use a shower seat so you do not need to stand while you shower  Sit on the toilet or a chair in your bathroom to dry yourself and put on clothing  This will prevent you from losing your balance from drying or dressing yourself while you are standing  · Keep paths clear  Remove books, shoes, and other objects from walkways and stairs  Place cords for telephones and lamps out of the way so that you do not need to walk over them  Tape them down if you cannot move them  Remove small rugs  If you cannot remove a rug, secure it with double-sided tape  This will prevent you from tripping  · Install bright lights in your home  Use night lights to help light paths to the bathroom or kitchen   Always turn on the light before you start walking  · Keep items you use often on shelves within reach  Do not use a step stool to help you reach an item  · Paint or place reflective tape on the edges of your stairs  This will help you see the stairs better  Follow up with your healthcare provider as directed:  Write down your questions so you remember to ask them during your visits  © Copyright [address] Information is for End User's use only and may not be sold, redistributed or otherwise used for commercial purposes  All illustrations and images included in CareNotes® are the copyrighted property of [employer]  or [address] [place], [place] [address]mahesh   The above information is an  only  It is not intended as medical advice for individual conditions or treatments  Talk to your doctor, nurse or pharmacist before following any medical regimen to see if it is safe and effective for you

## 2021-04-22 LAB — BACTERIA UR CULT: ABNORMAL

## 2021-04-23 DIAGNOSIS — N39.0 URINARY TRACT INFECTION WITHOUT HEMATURIA, SITE UNSPECIFIED: Primary | ICD-10-CM

## 2021-04-23 RX ORDER — SULFAMETHOXAZOLE AND TRIMETHOPRIM 800; 160 MG/1; MG/1
1 TABLET ORAL EVERY 12 HOURS SCHEDULED
Qty: 10 TABLET | Refills: 0 | Status: SHIPPED | OUTPATIENT
Start: 2021-04-23 | End: 2021-04-28

## 2021-04-26 ENCOUNTER — APPOINTMENT (OUTPATIENT)
Dept: LAB | Facility: HOSPITAL | Age: 76
End: 2021-04-26
Payer: MEDICARE

## 2021-04-26 ENCOUNTER — TRANSCRIBE ORDERS (OUTPATIENT)
Dept: LAB | Facility: HOSPITAL | Age: 76
End: 2021-04-26

## 2021-04-26 DIAGNOSIS — M81.0 SENILE OSTEOPOROSIS: ICD-10-CM

## 2021-04-26 DIAGNOSIS — M81.0 SENILE OSTEOPOROSIS: Primary | ICD-10-CM

## 2021-04-26 LAB
CALCIUM SERPL-MCNC: 8.6 MG/DL (ref 8.3–10.1)
T4 FREE SERPL-MCNC: 0.89 NG/DL (ref 0.76–1.46)
TSH SERPL DL<=0.05 MIU/L-ACNC: 2.26 UIU/ML (ref 0.36–3.74)

## 2021-04-26 PROCEDURE — 82310 ASSAY OF CALCIUM: CPT

## 2021-04-26 PROCEDURE — 84439 ASSAY OF FREE THYROXINE: CPT | Performed by: PHYSICIAN ASSISTANT

## 2021-04-26 PROCEDURE — 36415 COLL VENOUS BLD VENIPUNCTURE: CPT | Performed by: PHYSICIAN ASSISTANT

## 2021-04-26 PROCEDURE — 84443 ASSAY THYROID STIM HORMONE: CPT | Performed by: PHYSICIAN ASSISTANT

## 2021-04-29 ENCOUNTER — HOSPITAL ENCOUNTER (OUTPATIENT)
Dept: RADIOLOGY | Facility: HOSPITAL | Age: 76
Discharge: HOME | End: 2021-04-29
Payer: MEDICARE

## 2021-04-29 ENCOUNTER — TELEPHONE (OUTPATIENT)
Dept: OBGYN CLINIC | Facility: OTHER | Age: 76
End: 2021-04-29

## 2021-04-29 DIAGNOSIS — S09.90XA TRAUMATIC INJURY OF HEAD, INITIAL ENCOUNTER: ICD-10-CM

## 2021-04-29 PROCEDURE — 70450 CT HEAD/BRAIN W/O DYE: CPT

## 2021-04-29 PROCEDURE — G1004 CDSM NDSC: HCPCS

## 2021-04-29 NOTE — TELEPHONE ENCOUNTER
Grand daughter Brenda Lorenz is calling to check if Ramya Carol got the pictures she sent and waiting for a call back      Callback XK#[phone]

## 2021-04-29 NOTE — TELEPHONE ENCOUNTER
Patients Granddaughter , Daya Savannah called in stating Geetha's arm is completely black and blue from her upper arm thru her arm pit, down to her elbow , it does not go further than the elbow and is swollen , no warmth , no difference in her pain level  Anni Malloy denied any recent falls      C/b # [phone] , Daya Nuñez

## 2021-04-29 NOTE — TELEPHONE ENCOUNTER
Patient's granddaughter is sending pictures to office email  She was advised that with this kind of bruising this means there must have been a trauma to her L arm  Advised urgent care for evaluation and stabilization  Grand-daughter verbalized understanding    Awaiting pictures

## 2021-04-30 ENCOUNTER — APPOINTMENT (OUTPATIENT)
Dept: RADIOLOGY | Age: 76
End: 2021-04-30
Payer: MEDICARE

## 2021-04-30 ENCOUNTER — OFFICE VISIT (OUTPATIENT)
Dept: URGENT CARE | Age: 76
End: 2021-04-30
Payer: MEDICARE

## 2021-04-30 VITALS
TEMPERATURE: 97.9 F | DIASTOLIC BLOOD PRESSURE: 72 MMHG | RESPIRATION RATE: 20 BRPM | SYSTOLIC BLOOD PRESSURE: 156 MMHG | HEART RATE: 78 BPM | OXYGEN SATURATION: 97 %

## 2021-04-30 DIAGNOSIS — T14.8XXA BRUISING: ICD-10-CM

## 2021-04-30 DIAGNOSIS — M66.322 NONTRAUMATIC RUPTURE OF LEFT PROXIMAL BICEPS TENDON: ICD-10-CM

## 2021-04-30 DIAGNOSIS — T14.8XXA BRUISING: Primary | ICD-10-CM

## 2021-04-30 DIAGNOSIS — S46.912A LEFT SHOULDER STRAIN, INITIAL ENCOUNTER: ICD-10-CM

## 2021-04-30 PROCEDURE — 99213 OFFICE O/P EST LOW 20 MIN: CPT | Performed by: PHYSICIAN ASSISTANT

## 2021-04-30 PROCEDURE — G0463 HOSPITAL OUTPT CLINIC VISIT: HCPCS | Performed by: PHYSICIAN ASSISTANT

## 2021-04-30 PROCEDURE — 73090 X-RAY EXAM OF FOREARM: CPT

## 2021-04-30 PROCEDURE — 73060 X-RAY EXAM OF HUMERUS: CPT

## 2021-04-30 NOTE — TELEPHONE ENCOUNTER
If she has concerns, they should go to ER, UC or ortho UC  Barron barbosa to review any images until next week    Photo of bruising unlikely to change recommendation    (pt has rheumatoid OA and is currently scheduled for reverse TSA in 4 weeks)

## 2021-04-30 NOTE — TELEPHONE ENCOUNTER
Patient was seen in UC today and they said Non-Traumatic rupture of Biceps Tendon  Follow up with Ortho  Please advise

## 2021-04-30 NOTE — TELEPHONE ENCOUNTER
Please see attached images that came last evening  Patient's grand daughter given recommendations by PA  Advised that she go to UC today as [facility] does not have any availabilities at this time  Grand daughter states she is having no new pain to the arm, just the bruising  Advised in the meantime she should ice the bruising 20 min on 20 min off, and go to UC today for evaluation  [address] daughter verbalized understanding

## 2021-04-30 NOTE — PROGRESS NOTES
[facility]        NAME: Thong Smith is a 67 y o  female  : 1945    MRN: [id number]  DATE: 2021  TIME: 6:03 PM    Assessment and Plan   Bruising [T14  8XXA]  1  Bruising  XR forearm 2 vw left    XR humerus left   2  Left shoulder strain, initial encounter     3  Nontraumatic rupture of left proximal biceps tendon           Patient Instructions       Follow up with PCP in 3-5 days  Proceed to  ER if symptoms worsen  Chief Complaint     Chief Complaint   Patient presents with    Arm Pain     pt states noticed bruising on left arm 3 days ago, denies injury, states whole arm is painful  Pt able to use arm to ambulate with walker         History of Present Illness         Patient for evaluation of bruising to her left upper arm  Patient denies any known hinge or trauma to the area  She is scheduled for surgery on that left shoulder and   Patient is taking prednisone 15 mg daily  She denies any history of fall  Review of Systems   Review of Systems   Constitutional: Negative  Musculoskeletal: Negative for joint swelling  Neurological: Negative for weakness and numbness  Hematological: Does not bruise/bleed easily           Current Medications       Current Outpatient Medications:     acetaminophen (TYLENOL) 325 mg tablet, Take prn for mild pain, Disp: 30 tablet, Rfl: 0    Ascorbic Acid (VITAMIN C) 1000 MG tablet, Take 1,000 mg by mouth daily, Disp: , Rfl:     cholecalciferol (VITAMIN D3) 1,000 units tablet, Take 1,000 Units by mouth daily, Disp: , Rfl:     docusate sodium (COLACE) 100 mg capsule, Take 1 capsule (100 mg total) by mouth 2 (two) times a day, Disp: 10 capsule, Rfl: 0    famotidine (PEPCID) 20 mg tablet, Take 1 tablet (20 mg total) by mouth 2 (two) times a day, Disp: 90 tablet, Rfl: 1    Fluad Quadrivalent 0 5 ML PRSY, inject 0 5 milliliters intramuscularly, Disp: , Rfl:     folic acid (FOLVITE) 1 mg tablet, TAKE ONE TABLET BY MOUTH EVERY DAY, Disp: 30 tablet, Rfl: 2    gabapentin (NEURONTIN) 300 mg capsule, TAKE ONE CAPSULE BY MOUTH THREE TIMES A DAY, Disp: 270 capsule, Rfl: 1    hydroxychloroquine (PLAQUENIL) 200 mg tablet, 1 pill am 1/2 pill pm, Disp: , Rfl:     ibuprofen (MOTRIN) 200 mg tablet, Take by mouth every 6 (six) hours as needed for mild pain, Disp: , Rfl:     levothyroxine 25 mcg tablet, TAKE ONE TABLET BY MOUTH EVERY DAY, Disp: 90 tablet, Rfl: 1    methotrexate 2 5 MG tablet, 3 tablets by mouth once a week on Thursday, Disp:  , Rfl:     Multiple Vitamins-Minerals (CENTRUM SILVER PO), Take 1 tablet by mouth daily, Disp: , Rfl:     nortriptyline (PAMELOR) 50 mg capsule, TAKE 1 CAPSULE BY MOUTH AT BEDTIME, Disp: 90 capsule, Rfl: 0    polyethylene glycol (MIRALAX) 17 g packet, Take 17 g by mouth daily, Disp: 14 each, Rfl: 0    predniSONE 10 mg tablet, Take by mouth daily, Disp: , Rfl:     predniSONE 5 mg tablet, Take 5 mg by mouth daily, Disp: , Rfl:     Psyllium (METAMUCIL FIBER PO), Take 1 packet by mouth 3 (three) times a day  , Disp: , Rfl:     Vitamin E 400 units TABS, Take 400 Units by mouth daily  , Disp: , Rfl:     Current Allergies     Allergies as of 04/30/2021    (No Known Allergies)            The following portions of the patient's history were reviewed and updated as appropriate: allergies, current medications, past family history, past medical history, past social history, past surgical history and problem list      Past Medical History:   Diagnosis Date    Acute blood loss anemia 9/9/2020    Aftercare following joint replacement 9/9/2020    Patient had right reversed total shoulder arthroplasty   Pain was controlled when he left the hospital       Anxiety     Depression     Disease of thyroid gland     HYPO    Femur neck fracture (HCC)     GERD (gastroesophageal reflux disease)     Hyperthyroidism     RESOLVED: [id number]    Osteoporosis     Polio     PVD (peripheral vascular disease) (HCC)     Right BBB/left ant fasc block     UTI (urinary tract infection)     Varicella infection     LAST ASSESSED: [id number]       Past Surgical History:   Procedure Laterality Date    APPENDECTOMY      HIP ARTHROPLASTY Left 11/27/2017    Procedure: REMOVAL IM NAIL CONVERSION TO TOTAL HIP ARTHROPLASTY POSTERIOR APPROACH;  Surgeon: Mello Garcia MD;  [facility]: [facility];  Service: Orthopedics    HIP FRACTURE SURGERY      HIP SURGERY      both hips replaced;2013 left ,2014 right    JOINT REPLACEMENT Right     HIP TOTAL    OR CONV PREV HIP SURG TO TOT HIP ARTHROPLAS Left 10/4/2017    Procedure: [place] removal of left hip;  Surgeon: Mello Garcia MD;  [facility];  Service: Orthopedics    OR RECONSTR TOTAL SHOULDER IMPLANT Right 9/2/2020    Procedure: ARTHROPLASTY SHOULDER REVERSE;  Surgeon: Rodrigo Francis MD;  [facility]: [facility];  Service: Orthopedics    REVISION TOTAL HIP ARTHROPLASTY Right     TONSILLECTOMY         Family History   Problem Relation Age of Onset    Rheum arthritis Mother     Rheum arthritis Sister     Prostate cancer Brother          Medications have been verified  Objective   /72   Pulse 78   Temp 97 9 °F (36 6 °C)   Resp 20   SpO2 97%   No LMP recorded  Patient is postmenopausal        Physical Exam     Physical Exam  Vitals signs and nursing note reviewed  Constitutional:       General: She is not in acute distress  Appearance: Normal appearance  She is well-developed  She is not ill-appearing, toxic-appearing or diaphoretic  HENT:      Head: Normocephalic and atraumatic  Eyes:      Extraocular Movements: Extraocular movements intact  Conjunctiva/sclera: Conjunctivae normal       Pupils: Pupils are equal, round, and reactive to light  Musculoskeletal:      Comments: Ecchymosis of the left upper extremity to the elbow  No area of point tenderness    With resisted supination patient does pain and a click and slip of the proximal biceps tendon with slight deformity of the muscle  Neurovascularly intact  No open wounds  Skin:     General: Skin is warm and dry  Findings: Bruising present  No rash  Neurological:      General: No focal deficit present  Mental Status: She is alert and oriented to person, place, and time  Sensory: No sensory deficit  Psychiatric:         Mood and Affect: Mood normal          Behavior: Behavior normal          Thought Content:  Thought content normal          Judgment: Judgment normal         x-ray shows no acute fractures or findings

## 2021-04-30 NOTE — TELEPHONE ENCOUNTER
Spoke to patient's granddaughter  She stated she did not take the patient to UC  She does not want to wait at [facility] for them to see her and then come in to see us for follow up anyway  She wants us to order and xray and she can get that done then be seen for stabilization if needed  Advised that she may get the same recommendation after photos are reviewed, but nurse she spoke to last evening if not in until 10 so she will hear back after that in regards to photos  Verbalized understanding

## 2021-04-30 NOTE — PATIENT INSTRUCTIONS
Use ice frequently for the next 24-48 hours then may use moist heat     continue Tylenol and topical treatments as directed     follow-up with orthopedics for further evaluation treatment

## 2021-05-01 NOTE — TELEPHONE ENCOUNTER
Non-urgent  Gentle range of motion and activities to tolerance  Tylenol and Ice  She has rotator cuff arthropathy and rheumatoid OA, long head bicep tendon rupture is not unexpected in her disease process    She is having surgery for all of these issues in 4 weeks

## 2021-05-03 NOTE — TELEPHONE ENCOUNTER
Marco A Oreilly given above information and verbalized understanding  No appt needed for this, correct  See her at time of surgery?

## 2021-05-04 ENCOUNTER — IMMUNIZATIONS (OUTPATIENT)
Dept: FAMILY MEDICINE CLINIC | Facility: HOSPITAL | Age: 76
End: 2021-05-04

## 2021-05-04 DIAGNOSIS — Z23 ENCOUNTER FOR IMMUNIZATION: Primary | ICD-10-CM

## 2021-05-04 DIAGNOSIS — G47.09 OTHER INSOMNIA: ICD-10-CM

## 2021-05-04 PROCEDURE — 91300 SARS-COV-2 / COVID-19 MRNA VACCINE (PFIZER-BIONTECH) 30 MCG: CPT

## 2021-05-04 PROCEDURE — 0002A SARS-COV-2 / COVID-19 MRNA VACCINE (PFIZER-BIONTECH) 30 MCG: CPT

## 2021-05-04 RX ORDER — NORTRIPTYLINE HYDROCHLORIDE 50 MG/1
CAPSULE ORAL
Qty: 90 CAPSULE | Refills: 0 | Status: SHIPPED | OUTPATIENT
Start: 2021-05-04 | End: 2021-09-16 | Stop reason: SDUPTHER

## 2021-05-10 ENCOUNTER — ANESTHESIA EVENT (OUTPATIENT)
Dept: PERIOP | Facility: HOSPITAL | Age: 76
DRG: 483 | End: 2021-05-10
Payer: MEDICARE

## 2021-05-10 NOTE — PRE-PROCEDURE INSTRUCTIONS
Pre-Surgery Instructions:   Medication Instructions    Acetaminophen (TYLENOL ARTHRITIS PAIN PO) PRN    Ascorbic Acid (VITAMIN C) 1000 MG tablet stop 7 days prior DOS    cholecalciferol (VITAMIN D3) 1,000 units tablet stop 7 days prior DOS    docusate sodium (COLACE) 100 mg capsule ok take morning day of surgery    famotidine (PEPCID) 20 mg tablet ok take morning day of surgery    folic acid (FOLVITE) 1 mg tablet stop 7 days prior DOS    gabapentin (NEURONTIN) 300 mg capsule ok take morning day of surgery    hydroxychloroquine (PLAQUENIL) 200 mg tablet ok take morning day of surgery    ibuprofen (MOTRIN) 200 mg tablet Don't take NSAID's 7 days prior DOS    levothyroxine 25 mcg tablet ok take morning day of surgery    methotrexate 2 5 MG tablet ok take morning day of surgery    Multiple Vitamins-Minerals (CENTRUM SILVER PO) stop 7 days prior surgery    nortriptyline (PAMELOR) 50 mg capsule take at HS    predniSONE 5 mg tablet ok take morning day of surgery    Psyllium (METAMUCIL FIBER PO) don't take morning day of surgery    Vitamin E 400 units TABS stop 7 days prior surgery   Have you had / have a sore throat? No  have you had / have a cough less than 1 week? No  Have you had / have a fever greater than 100 0 - 100  4? No  Are you experiencing any shortness of breath? No    Review with patient via phone medications and showering instructions  Advised pre op testing need to be done  Advised ASC call with surgery schedule time, nothing eat or drink after midnight  Verbalized understanding  Acetaminophen Med Class  Continue to take this medication on your normal schedule  If this is an oral medication and you take it in the morning, then you may take this medicine with a sip of water  Antidepressant Med Class  Continue to take this medication on your normal schedule  If this is an oral medication and you take it in the morning, then you may take this medicine with a sip of water    Antiepileptic Med Class  Continue to take this medication on your normal schedule  If this is an oral medication and you take it in the morning, then you may take this medicine with a sip of water  H2 Blocker Med Class  Continue to take this medication on your normal schedule  If this is an oral medication and you take it in the morning, then you may take this medicine with a sip of water  Methotrexate Med Class  Continue to take this medication on your normal schedule  If this is an oral medication and you take it in the morning, then you may take this medicine with a sip of water  NSAID Med Class  Stop taking this medication at least 3 days prior to surgery/procedure  Steroids Med Class  Continue to take this medication on your normal schedule  If this is an oral medication and you take it in the morning, then you may take this medicine with a sip of water  Stool Softener Med Class  Continue to take this medication on your normal schedule  If this is an oral medication and you take it in the morning, then you may take this medicine with a sip of water  Thyroxine Med Class  Continue to take this medication on your normal schedule  If this is an oral medication and you take it in the morning, then you may take this medicine with a sip of water  Vitamin Med Class  You may continue to take any vitamin that your surgeon has prescribed to you up to the day before surgery  If your surgeon has not specifically prescribed this vitamin or instructed you to continue then stop taking 7 days prior to surgery

## 2021-05-11 ENCOUNTER — LAB (OUTPATIENT)
Dept: LAB | Facility: HOSPITAL | Age: 76
DRG: 483 | End: 2021-05-11
Attending: ORTHOPAEDIC SURGERY
Payer: MEDICARE

## 2021-05-11 ENCOUNTER — LAB REQUISITION (OUTPATIENT)
Dept: LAB | Facility: HOSPITAL | Age: 76
End: 2021-05-11
Payer: MEDICARE

## 2021-05-11 ENCOUNTER — HOSPITAL ENCOUNTER (OUTPATIENT)
Dept: INFUSION CENTER | Facility: HOSPITAL | Age: 76
Discharge: HOME | End: 2021-05-11
Payer: MEDICARE

## 2021-05-11 VITALS — BODY MASS INDEX: 20.23 KG/M2 | WEIGHT: 96.8 LBS | TEMPERATURE: 97.9 F

## 2021-05-11 DIAGNOSIS — M12.812 ROTATOR CUFF TEAR ARTHROPATHY OF LEFT SHOULDER: ICD-10-CM

## 2021-05-11 DIAGNOSIS — M75.102 ROTATOR CUFF TEAR ARTHROPATHY OF LEFT SHOULDER: ICD-10-CM

## 2021-05-11 DIAGNOSIS — S43.002A ACQUIRED SUBLUXATION OF LEFT SHOULDER, INITIAL ENCOUNTER: ICD-10-CM

## 2021-05-11 DIAGNOSIS — Z01.812 PRE-OPERATIVE LABORATORY EXAMINATION: ICD-10-CM

## 2021-05-11 DIAGNOSIS — M75.102 UNSPECIFIED ROTATOR CUFF TEAR OR RUPTURE OF LEFT SHOULDER, NOT SPECIFIED AS TRAUMATIC: ICD-10-CM

## 2021-05-11 LAB
ABO GROUP BLD: NORMAL
ALBUMIN SERPL BCP-MCNC: 3.6 G/DL (ref 3.5–5)
ALP SERPL-CCNC: 83 U/L (ref 46–116)
ALT SERPL W P-5'-P-CCNC: 22 U/L (ref 12–78)
ANION GAP SERPL CALCULATED.3IONS-SCNC: 6 MMOL/L (ref 4–13)
APTT PPP: 25 SECONDS (ref 23–37)
AST SERPL W P-5'-P-CCNC: 18 U/L (ref 5–45)
ATRIAL RATE: 86 BPM
BILIRUB SERPL-MCNC: 0.36 MG/DL (ref 0.2–1)
BLD GP AB SCN SERPL QL: NEGATIVE
BUN SERPL-MCNC: 10 MG/DL (ref 5–25)
CALCIUM SERPL-MCNC: 9.4 MG/DL (ref 8.3–10.1)
CHLORIDE SERPL-SCNC: 104 MMOL/L (ref 100–108)
CO2 SERPL-SCNC: 28 MMOL/L (ref 21–32)
CREAT SERPL-MCNC: 0.57 MG/DL (ref 0.6–1.3)
EST. AVERAGE GLUCOSE BLD GHB EST-MCNC: 105 MG/DL
GFR SERPL CREATININE-BSD FRML MDRD: 90 ML/MIN/1.73SQ M
GLUCOSE P FAST SERPL-MCNC: 93 MG/DL (ref 65–99)
HBA1C MFR BLD: 5.3 %
INR PPP: 0.96 (ref 0.84–1.19)
P AXIS: 86 DEGREES
POTASSIUM SERPL-SCNC: 3.7 MMOL/L (ref 3.5–5.3)
PR INTERVAL: 100 MS
PROT SERPL-MCNC: 6.9 G/DL (ref 6.4–8.2)
PROTHROMBIN TIME: 12.7 SECONDS (ref 11.6–14.5)
QRS AXIS: 86 DEGREES
QRSD INTERVAL: 116 MS
QT INTERVAL: 424 MS
QTC INTERVAL: 507 MS
RH BLD: POSITIVE
SODIUM SERPL-SCNC: 138 MMOL/L (ref 136–145)
SPECIMEN EXPIRATION DATE: NORMAL
T WAVE AXIS: 76 DEGREES
VENTRICULAR RATE: 86 BPM

## 2021-05-11 PROCEDURE — 85610 PROTHROMBIN TIME: CPT

## 2021-05-11 PROCEDURE — 93010 ELECTROCARDIOGRAM REPORT: CPT | Performed by: INTERNAL MEDICINE

## 2021-05-11 PROCEDURE — 96401 CHEMO ANTI-NEOPL SQ/IM: CPT

## 2021-05-11 PROCEDURE — 86901 BLOOD TYPING SEROLOGIC RH(D): CPT | Performed by: ORTHOPAEDIC SURGERY

## 2021-05-11 PROCEDURE — 93005 ELECTROCARDIOGRAM TRACING: CPT

## 2021-05-11 PROCEDURE — 86900 BLOOD TYPING SEROLOGIC ABO: CPT | Performed by: ORTHOPAEDIC SURGERY

## 2021-05-11 PROCEDURE — 85730 THROMBOPLASTIN TIME PARTIAL: CPT

## 2021-05-11 PROCEDURE — 80053 COMPREHEN METABOLIC PANEL: CPT

## 2021-05-11 PROCEDURE — 83036 HEMOGLOBIN GLYCOSYLATED A1C: CPT

## 2021-05-11 PROCEDURE — 86850 RBC ANTIBODY SCREEN: CPT | Performed by: ORTHOPAEDIC SURGERY

## 2021-05-11 PROCEDURE — 36415 COLL VENOUS BLD VENIPUNCTURE: CPT

## 2021-05-11 RX ADMIN — DENOSUMAB 60 MG: 60 INJECTION SUBCUTANEOUS at 08:11

## 2021-05-11 NOTE — PLAN OF CARE
Problem: Potential for Falls  Goal: Patient will remain free of falls  Description: INTERVENTIONS:  - Assess patient frequently for physical needs  -  Identify cognitive and physical deficits and behaviors that affect risk of falls    -  [place] fall precautions as indicated by assessment   - Educate patient/family on patient safety including physical limitations  - Instruct patient to call for assistance with activity based on assessment  - Modify environment to reduce risk of injury  - Consider OT/PT consult to assist with strengthening/mobility  Outcome: Progressing

## 2021-05-13 ENCOUNTER — OFFICE VISIT (OUTPATIENT)
Dept: OBGYN CLINIC | Facility: OTHER | Age: 76
End: 2021-05-13
Payer: MEDICARE

## 2021-05-13 VITALS
DIASTOLIC BLOOD PRESSURE: 75 MMHG | HEART RATE: 84 BPM | SYSTOLIC BLOOD PRESSURE: 148 MMHG | BODY MASS INDEX: 20.36 KG/M2 | HEIGHT: 58 IN | WEIGHT: 97 LBS

## 2021-05-13 DIAGNOSIS — M12.812 ROTATOR CUFF ARTHROPATHY OF LEFT SHOULDER: ICD-10-CM

## 2021-05-13 DIAGNOSIS — M19.012 OSTEOARTHRITIS OF GLENOHUMERAL JOINT, LEFT: Primary | ICD-10-CM

## 2021-05-13 DIAGNOSIS — S46.112A RUPTURE LONG HEAD BICEPS TENDON, LEFT, INITIAL ENCOUNTER: ICD-10-CM

## 2021-05-13 PROBLEM — S46.011D TRAUMATIC ROTATOR CUFF TEAR, RIGHT, SUBSEQUENT ENCOUNTER: Status: RESOLVED | Noted: 2020-08-24 | Resolved: 2021-05-13

## 2021-05-13 PROCEDURE — 99213 OFFICE O/P EST LOW 20 MIN: CPT | Performed by: PHYSICIAN ASSISTANT

## 2021-05-13 NOTE — PROGRESS NOTES
Assessment  Diagnoses and all orders for this visit:    Osteoarthritis of glenohumeral joint, left  -     Durable Medical Equipment    Rupture long head biceps tendon, left, initial encounter    Rotator cuff arthropathy of left shoulder        Discussion and Plan:    1  Activities to tolerance until surgery  2  Prescription for Platform walker attachment for existing walker provided for post-op TSA  3  Follow up as scheduled after TSA  4  Elbow, wrist and hand range of motion  5  Tylenol PRN    Subjective:   Patient ID: Óscar Osei is a 67 y o  female      Yahir Perez presents to the office for bruising of her left shoulder  She was seen in urgent care and diagnosed with proximal bicep tendon rupture  Bruising started without injury or trauma  She is able to use the arm for her walker without pain  Bruising has improved since she was told by urgent care to follow up with us  She is able to use the left shoulder but with pain with overhead motion  She is schedule for left reverse total shoulder arthroplasty in 2 weeks  Denies numbness or tingling  The following portions of the patient's history were reviewed and updated as appropriate: allergies, current medications, past family history, past medical history, past social history, past surgical history and problem list     Review of Systems   Constitutional: Negative for chills and fever  HENT: Negative for hearing loss  Eyes: Negative for visual disturbance  Respiratory: Negative for shortness of breath  Cardiovascular: Negative for chest pain  Gastrointestinal: Negative for abdominal pain  Musculoskeletal:        As reviewed in the HPI   Skin: Negative for rash  Neurological:        As reviewed in the HPI   Psychiatric/Behavioral: Negative for agitation  Objective:  /75   Pulse 84   Ht 4' 10" (1 473 m)   Wt 44 kg (97 lb)   BMI 20 27 kg/m²       Left Shoulder Exam     Tenderness   The patient is experiencing no tenderness  Range of Motion   Passive abduction: 90   External rotation: 0   Forward flexion: 130 (with pain)     Muscle Strength   External rotation: 4/5     Other   Erythema: absent  Sensation: normal  Pulse: present     Comments:  Resolving bruise left upper arm  malina deformity            Physical Exam  Constitutional:       Appearance: She is well-developed  HENT:      Head: Normocephalic  Neck:      Musculoskeletal: Normal range of motion  Pulmonary:      Breath sounds: Normal breath sounds  No wheezing  Skin:     General: Skin is warm and dry  Neurological:      Mental Status: She is alert and oriented to person, place, and time  Psychiatric:         Behavior: Behavior normal          Thought Content:  Thought content normal          Judgment: Judgment normal

## 2021-05-18 ENCOUNTER — TELEPHONE (OUTPATIENT)
Dept: OBGYN CLINIC | Facility: HOSPITAL | Age: 76
End: 2021-05-18

## 2021-05-18 NOTE — TELEPHONE ENCOUNTER
Returned called and rescheduled appt with siddharth @ [facility] on 6/14

## 2021-05-18 NOTE — TELEPHONE ENCOUNTER
Patients granddaughter called to reschedule post op appointment  Could not find any available times to accommodate her she would like a call  Marcel  can be reached at [phone]   Thank you

## 2021-05-20 ENCOUNTER — CONSULT (OUTPATIENT)
Dept: INTERNAL MEDICINE CLINIC | Facility: CLINIC | Age: 76
End: 2021-05-20

## 2021-05-20 VITALS
SYSTOLIC BLOOD PRESSURE: 132 MMHG | HEART RATE: 80 BPM | BODY MASS INDEX: 20.78 KG/M2 | DIASTOLIC BLOOD PRESSURE: 81 MMHG | HEIGHT: 58 IN | WEIGHT: 99 LBS | OXYGEN SATURATION: 99 % | TEMPERATURE: 98 F

## 2021-05-20 DIAGNOSIS — M12.812 ROTATOR CUFF ARTHROPATHY OF LEFT SHOULDER: ICD-10-CM

## 2021-05-20 DIAGNOSIS — E03.8 HYPOTHYROIDISM DUE TO HASHIMOTO'S THYROIDITIS: ICD-10-CM

## 2021-05-20 DIAGNOSIS — I45.10 RBBB: ICD-10-CM

## 2021-05-20 DIAGNOSIS — E06.3 HYPOTHYROIDISM DUE TO HASHIMOTO'S THYROIDITIS: ICD-10-CM

## 2021-05-20 DIAGNOSIS — N30.01 ACUTE CYSTITIS WITH HEMATURIA: ICD-10-CM

## 2021-05-20 DIAGNOSIS — Z01.818 PREOP EXAMINATION: Primary | ICD-10-CM

## 2021-05-20 DIAGNOSIS — M32.8 OTHER FORMS OF SYSTEMIC LUPUS ERYTHEMATOSUS, UNSPECIFIED ORGAN INVOLVEMENT STATUS (HCC): ICD-10-CM

## 2021-05-20 DIAGNOSIS — M05.79 RHEUMATOID ARTHRITIS INVOLVING MULTIPLE SITES WITH POSITIVE RHEUMATOID FACTOR (HCC): ICD-10-CM

## 2021-05-20 PROBLEM — K59.03 DRUG-INDUCED CONSTIPATION: Status: RESOLVED | Noted: 2019-12-03 | Resolved: 2021-05-20

## 2021-05-20 PROCEDURE — 1124F ACP DISCUSS-NO DSCNMKR DOCD: CPT | Performed by: PHYSICIAN ASSISTANT

## 2021-05-20 PROCEDURE — 99213 OFFICE O/P EST LOW 20 MIN: CPT | Performed by: PHYSICIAN ASSISTANT

## 2021-05-20 RX ORDER — AMOXICILLIN 500 MG/1
500 CAPSULE ORAL EVERY 8 HOURS SCHEDULED
Qty: 21 CAPSULE | Refills: 0 | Status: SHIPPED | OUTPATIENT
Start: 2021-05-20 | End: 2021-05-27

## 2021-05-20 NOTE — PROGRESS NOTES
Assessment/Plan: On your visit today we reviewed your preop evaluation prior to your surgery for left shoulder rotator cuff repair and tendon repair as scheduled on June 1st     We did review that preop laboratory testing and EKG are acceptable for surgery as scheduled  Unfortunately we did review that you never completed her treatment for your urine infection 1 month ago and are symptomatic and therefore we are going to retreat you with a different antibiotic  As per discussion your antibiotic was changed based on the culture and sensitivity and when you were not able to tolerate the medication you did not notify our office  We discussed the importance that this needs to be treated and resolved prior to your surgery  Please complete the full course of antibiotics and you have been provided with a script to get follow-up urine testing as dated on 5/26/2021  We discussed it is extremely important that you get the urine test completed on that day allowing me to get your results back in time to make sure the infection has cleared and be able to proceed with your surgery as scheduled on June 1st     We discussed if for any reason you are not able to tolerate this antibiotic you need to contact our office immediately so we can make adjustments and get you treated in time  We discussed I am aware your very concerned about having to delay her surgery but once again we reviewed the importance of treating this infection prior to your surgery  Please note that this preop clearance note will be addended once urinary testing results are available for review  Addendum  5/28/2021  As noted during visit patient did not complete the antibiotics for known urine infection and was aware this would have to be treated prior to clearance for surgery  Patient confirm she completed the course of antibiotics and follow up urine testing does confirm resolution of infection    Urinalysis however does note hematuria and proteinuria however patient is asymptomatic and blood tests confirm normal kidney functions  Patient will require workup for asymptomatic hematuria but this may be completed after her shoulder surgery and recovery  At this time patient is medically stable and as noted urine infection resolved with treatment, chronic conditions are optimized and patient is medically stable and cleared for left shoulder surgery as scheduled  No problem-specific Assessment & Plan notes found for this encounter  Diagnoses and all orders for this visit:    Preop examination    Rotator cuff arthropathy of left shoulder    Acute cystitis with hematuria  -     amoxicillin (AMOXIL) 500 mg capsule; Take 1 capsule (500 mg total) by mouth every 8 (eight) hours for 7 days  -     Urine culture; Future  -     Cancel: Urinalysis with microscopic; Future  -     Urinalysis with microscopic; Future    Rheumatoid arthritis involving multiple sites with positive rheumatoid factor (HCC)    Other forms of systemic lupus erythematosus, unspecified organ involvement status ([facility] [address] )    RBBB    Hypothyroidism due to Hashimoto's thyroiditis          Subjective:      Patient ID: Maryuri Lema is a 67 y o  female  Patient presents today for preop evaluation for orthopedic shoulder surgery scheduled on June 1 for arthroplasty of L shoulder with Dr Tomi Green      Patient has had significant pain in her left shoulder limiting her activities of daily living  Since initial evaluation and scheduling for surgery patient did also experience nontraumatic rupture of biceps tendon further requiring need for surgery as scheduled  Patient did complete her preoperative labs and EKG  CMP, PT INR, A1c are normal     EKG positive for right bundle branch block not a new diagnosis patient has been diagnosed with this 3 years previously and stable unchanged        Patient did have rotator cuff surgery on her right shoulder a few months ago recovered well had no issues with anesthesia  On today's visit however patient does report that she never completed the antibiotics for urine infection that was diagnosed 1 month ago  Patient had presented with symptoms of acute UTI was started on antibiotics and once the  Culture and sensitivity was completed she was resistant to the [address] and therefore was switched to Bactrim  Patient however reports that she took 1 tablet and she felt unwell never completed the antibiotics did not contact our office and is still reporting urinary frequency today  Reviewed with patient that we are going to have to get her treated for this urine infection because she cannot be cleared for surgery with a active bacterial infection  Patient very concerned and upset stating that we cannot delay her surgery because she is in too much pain  Reviewed with patient I completely understand but she should have notified us a month ago when she was not able to tolerate the antibiotics and we could have changed them at that time  Reviewed with patient after discussion with attending as I want to avoid fluoroquinolones due to increased risk based on age as well as the fact that she is on prednisone on a daily basis for her lupus and rheumatoid arthritis  Will place her on amoxicillin 3 times a day for 1 week have ordered follow-up urine test and culture as well  As long as patient takes a full course of antibiotics and gets the urine test completed when she was advised it must be completed on that day should be able to get the results back and submit clearance for her surgery without having to reschedule this  As per discussion with patient because she is so concerned about the surgery advised her that she absolutely must get the  Urine test completed to avoid delaying her surgery           This consultation is requested for the specific conditions prompting preoperative evaluation (i e  because of potential affect on operative risk): RA, SLE, hypothyroidism  Planned anesthesia: regional and general  The patient has the following known anesthesia issues: None  Patients bleeding risk: no recent abnormal bleeding, no remote history of abnormal bleeding and no use of Ca-channel blockers  Patient does not have objections to receiving blood products if needed  Predictors of intubation difficulty:   Morbid obesity? no   Anatomically abnormal facies? no   Prominent incisors? no   Receding mandible? no   Short, thick neck? no   Neck range of motion: abnormal dec flexion and extension     Cardiographics  ECG: no change since previous ECG dated 8/20/2020    Lab Review   Lab Requisition on 05/11/2021  ABO Grouping              Value: O                  Dt: 05/11/2021  Rh Factor                 Value: Positive           Dt: 05/11/2021  Antibody Screen           Value: Negative           Dt: 05/11/2021  Specimen Expiration Date  Value: [id number]           Dt: 05/11/2021  ------------  Lab on 05/11/2021  Sodium                    Value: 138(mmol/L)        Dt: 05/11/2021  Potassium                 Value: 3 7(mmol/L)        Dt: 05/11/2021  Chloride                  Value: 104(mmol/L)        Dt: 05/11/2021  CO2                       Value: 28(mmol/L)         Dt: 05/11/2021  ANION GAP                 Value:  6(mmol/L)          Dt: 05/11/2021  BUN                       Value: 10(mg/dL)          Dt: 05/11/2021  Creatinine                Value: 0 57(mg/dL)*       Dt: 05/11/2021  Glucose, Fasting          Value: 93(mg/dL)          Dt: 05/11/2021  Calcium                   Value: 9 4(mg/dL)         Dt: 05/11/2021  AST                       Value: 18(U/L)            Dt: 05/11/2021  ALT                       Value: 22(U/L)            Dt: 05/11/2021  Alkaline Phosphatase      Value: 83(U/L)            Dt: 05/11/2021  Total Protein             Value: 6 9(g/dL)          Dt: 05/11/2021  Albumin                   Value: 3 6(g/dL)          Dt: 05/11/2021  Total Bilirubin           Value: 0 36(mg/dL)        Dt: 05/11/2021  eGFR                      Value: 90(ml/min/1 73sq m) Dt: 05/11/2021  PTT                       Value: 25(seconds)        Dt: 05/11/2021  Protime                   Value: 12  7(seconds)      Dt: 05/11/2021  INR                       Value: 0 96               Dt: 05/11/2021  Ventricular Rate          Value: 86(BPM)            Dt: 05/11/2021  Atrial Rate               Value: 86(BPM)            Dt: 05/11/2021  HI Interval               Value: 100(ms)            Dt: 05/11/2021  QRSD Interval             Value: 116(ms)            Dt: 05/11/2021  QT Interval               Value: 424(ms)            Dt: 05/11/2021  QTC Interval              Value: 507(ms)            Dt: 05/11/2021  P Axis                    Value: 86(degrees)        Dt: 05/11/2021  QRS Axis                  Value: 86(degrees)        Dt: 05/11/2021  T Wave Axis               Value: 76(degrees)        Dt: 05/11/2021  ------------  Appointment on 05/11/2021  Hemoglobin A1C            Value: 5 3(%)             Dt: 05/11/2021  EAG                       Value: 105(mg/dl)         Dt: 05/11/2021  ------------  Assessment:    67 y o  female with planned surgery as above  Known risk factors for perioperative complications: RA,SLE, RBBB    Difficulty with intubation is not anticipated  The following portions of the patient's history were reviewed and updated as appropriate: allergies, current medications, past family history, past medical history, past social history, past surgical history and problem list     Review of Systems   Constitutional: Negative  Negative for chills, diaphoresis and fever  Respiratory: Negative  Negative for cough and shortness of breath  Cardiovascular: Negative  Negative for chest pain, palpitations and leg swelling  Gastrointestinal: Negative  Negative for abdominal pain, constipation, diarrhea and vomiting     Endocrine: Negative for cold intolerance and heat intolerance  Genitourinary: Positive for frequency and urgency  Musculoskeletal: Positive for arthralgias, gait problem, joint swelling and myalgias  Skin: Negative for rash  Neurological: Negative for dizziness, seizures, syncope and headaches  Psychiatric/Behavioral: Negative  Objective:      /81 (BP Location: Right arm, Patient Position: Sitting, Cuff Size: Standard)   Pulse 80   Temp 98 °F (36 7 °C) (Temporal)   Ht 4' 10" (1 473 m)   Wt 44 9 kg (99 lb)   SpO2 99%   BMI 20 69 kg/m²          Physical Exam  Vitals signs and nursing note reviewed  Constitutional:       General: She is not in acute distress  Appearance: She is not ill-appearing  HENT:      Head: Normocephalic  Mouth/Throat:      Pharynx: Oropharynx is clear  Eyes:      Extraocular Movements: Extraocular movements intact  Conjunctiva/sclera: Conjunctivae normal    Neck:      Vascular: No carotid bruit  Cardiovascular:      Rate and Rhythm: Normal rate and regular rhythm  Heart sounds: Normal heart sounds  Pulmonary:      Effort: Pulmonary effort is normal       Breath sounds: Normal breath sounds  Abdominal:      General: Bowel sounds are normal       Tenderness: There is no abdominal tenderness  There is no guarding  Musculoskeletal:         General: Deformity present  Comments: Patient does have some slight decreased range of motion to neck flexion and extension but will not interfere with surgery as scheduled  Patient has significant 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

## 2021-05-20 NOTE — PATIENT INSTRUCTIONS
On your visit today we reviewed your preop evaluation prior to your surgery for left shoulder rotator cuff repair and tendon repair as scheduled on June 1st     We did review that preop laboratory testing and EKG are acceptable for surgery as scheduled  Unfortunately we did review that you never completed her treatment for your urine infection 1 month ago and are symptomatic and therefore we are going to retreat you with a different antibiotic  As per discussion your antibiotic was changed based on the culture and sensitivity and when you were not able to tolerate the medication you did not notify our office  We discussed the importance that this needs to be treated and resolved prior to your surgery  Please complete the full course of antibiotics and you have been provided with a script to get follow-up urine testing as dated on 5/26/2021  We discussed it is extremely important that you get the urine test completed on that day allowing me to get your results back in time to make sure the infection has cleared and be able to proceed with your surgery as scheduled on June 1st     We discussed if for any reason you are not able to tolerate this antibiotic you need to contact our office immediately so we can make adjustments and get you treated in time  We discussed I am aware your very concerned about having to delay her surgery but once again we reviewed the importance of treating this infection prior to your surgery  Please note that this preop clearance note will be addended once urinary testing results are available for review

## 2021-05-20 NOTE — TELEPHONE ENCOUNTER
Form not completed on appt date due to patient has not improvement on her urinary symptoms and has been started on antibiotics and need to have an urine culture completed on 5/26  After provider received results of the culture would decide if patient is clear to have the surgery  Form would remain in BLUE clinical folder

## 2021-05-24 ENCOUNTER — TELEPHONE (OUTPATIENT)
Dept: INTERNAL MEDICINE CLINIC | Facility: CLINIC | Age: 76
End: 2021-05-24

## 2021-05-24 NOTE — TELEPHONE ENCOUNTER
Folder Color- Blue    Name of Form-Physician Response    Form to be filled out by-Clarita Campos    Form to be Faxed [phone]    Patient made aware of 10 business day policy

## 2021-05-25 NOTE — TELEPHONE ENCOUNTER
Urine culture hasn't been completed yet  Attempted made to contact patient and didn't answer  Message left on voice mail to call us back

## 2021-05-26 ENCOUNTER — APPOINTMENT (OUTPATIENT)
Dept: LAB | Facility: HOSPITAL | Age: 76
DRG: 483 | End: 2021-05-26
Payer: MEDICARE

## 2021-05-26 ENCOUNTER — EVALUATION (OUTPATIENT)
Dept: PHYSICAL THERAPY | Facility: REHABILITATION | Age: 76
End: 2021-05-26
Payer: MEDICARE

## 2021-05-26 DIAGNOSIS — S43.002A ACQUIRED SUBLUXATION OF LEFT SHOULDER, INITIAL ENCOUNTER: ICD-10-CM

## 2021-05-26 DIAGNOSIS — N30.01 ACUTE CYSTITIS WITH HEMATURIA: ICD-10-CM

## 2021-05-26 DIAGNOSIS — M75.102 ROTATOR CUFF TEAR ARTHROPATHY OF LEFT SHOULDER: Primary | ICD-10-CM

## 2021-05-26 DIAGNOSIS — M12.812 ROTATOR CUFF TEAR ARTHROPATHY OF LEFT SHOULDER: Primary | ICD-10-CM

## 2021-05-26 LAB

## 2021-05-26 PROCEDURE — 81001 URINALYSIS AUTO W/SCOPE: CPT

## 2021-05-26 PROCEDURE — 87086 URINE CULTURE/COLONY COUNT: CPT

## 2021-05-26 PROCEDURE — 97162 PT EVAL MOD COMPLEX 30 MIN: CPT | Performed by: PHYSICAL THERAPIST

## 2021-05-26 NOTE — PROGRESS NOTES
PT Evaluation     Today's date: 2021  Patient name: Maico Payment  : 1945  MRN: [id number]  Referring provider: Jad Nixon MD  Dx:   Encounter Diagnosis     ICD-10-CM    1  Rotator cuff tear arthropathy of left shoulder  M75 [id number] Ambulatory referral to Physical Therapy    M12 [id number]    2  Acquired subluxation of left shoulder, initial encounter  [id number] Ambulatory referral to Physical Therapy                  Assessment  Assessment details: Osvaldo Medel is a 67 y o  female presenting to PT w/ a multi year history of left shoulder pain  Currently scheduled for L reverse TSA 2021  Pt would benefit from skilled PT services to address the below listed impairments and functional limitations to encourage return to [facility]  Given limitations in transportation she would benefit from inpatient rehab/home PT to maximize rehab potential      Impairments: abnormal or restricted ROM, activity intolerance, impaired physical strength, lacks appropriate home exercise program and pain with function  Functional limitations: Difficulty lifting arm, overhead movement, lifting/carrying/pushing/pulling objectsUnderstanding of Dx/Px/POC: good   Prognosis: good    Goals  STG: L shoulder PROM to [facility] in 6 weeks  STG: Subjectively reports pain at worst 2/10 most in 4 weeks  STG: I w/ HEPs 1 week after prescription  LTG: L shoulder AROM to [facility] in 10 weeks  LTG: FOTO >= to goal by d/c  LTG: I w/ comprehensive HEP by d/c       Plan  Patient would benefit from: PT eval and skilled physical therapy  Planned modality interventions: TENS, thermotherapy: hydrocollator packs and cryotherapy  Planned therapy interventions: joint mobilization, manual therapy, massage, strengthening, stretching, therapeutic activities, therapeutic exercise, therapeutic training, neuromuscular re-education, patient education, flexibility, functional ROM exercises, gait training, graded activity, graded exercise, graded motor and home exercise program  Frequency: 2x week  Duration in visits: 20  Duration in weeks: 20  Plan of Care beginning date: 2021  Plan of Care expiration date: 10/13/2021  Treatment plan discussed with: patient        Subjective Evaluation    History of Present Illness  Mechanism of injury: Reports that a few months ago she was doing pulley exercises at home for her right shoulder which she had replaced 8 months ago  States that recently her left shoulder has been giving her problems  States that she has a history of RA and OA which gives her pain in the shoulder  States that she received recommendation from orthopedics to have reverse shoulder replacement  She states that at this point her shoulder is limiting her function with using hands to assist in sit to stand transfers and navigating with her walker  Also she notices difficulty using a railing with the left arm     Pain  Current pain ratin  At best pain ratin  At worst pain ratin  Quality: throbbing, tight, dull ache, discomfort and pulling    Patient Goals  Patient goals for therapy: decreased pain, increased motion, increased strength and return to sport/leisure activities          Objective     Active Range of Motion   Left Shoulder   Flexion: 90 degrees   Abduction: 90 degrees   External rotation BTH: C4     Right Shoulder   Flexion: 50 degrees   External rotation BTH: WFL  Internal rotation BTB: WFL    Strength/Myotome Testing     Left Shoulder     Planes of Motion   Flexion: 4-   Extension: 4   Abduction: 4-   External rotation at 0°: 4   Internal rotation at 0°: 4-     Right Shoulder     Planes of Motion   Flexion: 3+   Extension: 4-   Abduction: 3+   External rotation at 0°: 3+   Internal rotation at 0°: 3+              Precautions: RA, OA, s/p L TSA 2021      Manuals                                                                 Neuro Re-Ed Ther Ex                                                                                                                     Ther Activity                                       Gait Training                                       Modalities

## 2021-05-26 NOTE — TELEPHONE ENCOUNTER
Called patient and has been reminded   Per patient she would call her granddaughter to take her to the lab today before 3:30 pm

## 2021-05-27 DIAGNOSIS — M19.012 OSTEOARTHRITIS OF GLENOHUMERAL JOINT, LEFT: Primary | ICD-10-CM

## 2021-05-27 LAB — BACTERIA UR CULT: NORMAL

## 2021-05-28 ENCOUNTER — TELEPHONE (OUTPATIENT)
Dept: INTERNAL MEDICINE CLINIC | Facility: CLINIC | Age: 76
End: 2021-05-28

## 2021-05-28 DIAGNOSIS — R80.9 HEMATURIA WITH PROTEINURIA: ICD-10-CM

## 2021-05-28 DIAGNOSIS — R31.9 HEMATURIA WITH PROTEINURIA: ICD-10-CM

## 2021-05-28 DIAGNOSIS — R31.9 HEMATURIA OF UNKNOWN CAUSE: Primary | ICD-10-CM

## 2021-05-28 NOTE — TELEPHONE ENCOUNTER
Genevieve Gustafson from   [id number] called, they still need to know if the patient is cleared for Surgery on Tuesday, 6/1  Please review her urine test ASAP and advise if the patient is cleared prior to 4pm today

## 2021-05-28 NOTE — TELEPHONE ENCOUNTER
Urine culture results in  See result note regarding this  Per BILL Gay patient can't be cleared for surgery yet  Urine test came back showing protein and high amount of blood  Luigi Floreso spoke with Dr Harshal Salgado  Per Luigi Floreso, Dr Harshal Salgado want to check patient urine again and blood work to check her kidneys  Per Luigi Dubose, Dr Harshal Salgado would not sign the note for pre-op because we don't know if patient have kidney problems  (see results note)

## 2021-06-01 ENCOUNTER — APPOINTMENT (OUTPATIENT)
Dept: RADIOLOGY | Facility: HOSPITAL | Age: 76
DRG: 483 | End: 2021-06-01
Payer: MEDICARE

## 2021-06-01 ENCOUNTER — ANESTHESIA (OUTPATIENT)
Dept: PERIOP | Facility: HOSPITAL | Age: 76
DRG: 483 | End: 2021-06-01
Payer: MEDICARE

## 2021-06-01 ENCOUNTER — HOSPITAL ENCOUNTER (INPATIENT)
Facility: HOSPITAL | Age: 76
LOS: 1 days | Discharge: NURSING, REHAB OR LONG-TERM CARE | DRG: 483 | End: 2021-06-03
Attending: ORTHOPAEDIC SURGERY | Admitting: ORTHOPAEDIC SURGERY
Payer: MEDICARE

## 2021-06-01 DIAGNOSIS — M12.812 ROTATOR CUFF ARTHROPATHY OF LEFT SHOULDER: Primary | ICD-10-CM

## 2021-06-01 DIAGNOSIS — Z96.612 S/P REVERSE TOTAL SHOULDER ARTHROPLASTY, LEFT: ICD-10-CM

## 2021-06-01 LAB — GLUCOSE SERPL-MCNC: 120 MG/DL (ref 65–140)

## 2021-06-01 PROCEDURE — C1776 JOINT DEVICE (IMPLANTABLE): HCPCS | Performed by: ORTHOPAEDIC SURGERY

## 2021-06-01 PROCEDURE — C1713 ANCHOR/SCREW BN/BN,TIS/BN: HCPCS | Performed by: ORTHOPAEDIC SURGERY

## 2021-06-01 PROCEDURE — C9290 INJ, BUPIVACAINE LIPOSOME: HCPCS | Performed by: INTERNAL MEDICINE

## 2021-06-01 PROCEDURE — 0RRK00Z REPLACEMENT OF LEFT SHOULDER JOINT WITH REVERSE BALL AND SOCKET SYNTHETIC SUBSTITUTE, OPEN APPROACH: ICD-10-PCS | Performed by: ORTHOPAEDIC SURGERY

## 2021-06-01 PROCEDURE — G9197 ORDER FOR CEPH: HCPCS | Performed by: ORTHOPAEDIC SURGERY

## 2021-06-01 PROCEDURE — 73020 X-RAY EXAM OF SHOULDER: CPT

## 2021-06-01 PROCEDURE — 23472 RECONSTRUCT SHOULDER JOINT: CPT | Performed by: ORTHOPAEDIC SURGERY

## 2021-06-01 PROCEDURE — 23472 RECONSTRUCT SHOULDER JOINT: CPT | Performed by: PHYSICIAN ASSISTANT

## 2021-06-01 PROCEDURE — NC001 PR NO CHARGE: Performed by: ORTHOPAEDIC SURGERY

## 2021-06-01 PROCEDURE — 99215 OFFICE O/P EST HI 40 MIN: CPT | Performed by: INTERNAL MEDICINE

## 2021-06-01 PROCEDURE — 82948 REAGENT STRIP/BLOOD GLUCOSE: CPT

## 2021-06-01 DEVICE — IMPLANTABLE DEVICE
Type: IMPLANTABLE DEVICE | Site: SHOULDER | Status: FUNCTIONAL
Brand: FLEX SHOULDER SYSTEM

## 2021-06-01 DEVICE — IMPLANTABLE DEVICE
Type: IMPLANTABLE DEVICE | Status: FUNCTIONAL
Brand: AEQUALIS™ PERFORM+ REVERSED

## 2021-06-01 DEVICE — SCREW PERIPHERAL 5 X 18MM: Type: IMPLANTABLE DEVICE | Site: SHOULDER | Status: FUNCTIONAL

## 2021-06-01 DEVICE — SCREW PERIPHERAL 5 X 22MM: Type: IMPLANTABLE DEVICE | Site: SHOULDER | Status: FUNCTIONAL

## 2021-06-01 DEVICE — IMPLANTABLE DEVICE
Type: IMPLANTABLE DEVICE | Site: SHOULDER | Status: FUNCTIONAL
Brand: AEQUALIS™ ASCEND™ FLEX

## 2021-06-01 DEVICE — SCREW PERIPHERAL 5 X 14MM: Type: IMPLANTABLE DEVICE | Site: SHOULDER | Status: FUNCTIONAL

## 2021-06-01 DEVICE — IMPLANTABLE DEVICE
Type: IMPLANTABLE DEVICE | Status: FUNCTIONAL
Brand: AEQUALIS™ PERFORM™ REVERSED

## 2021-06-01 DEVICE — IMPLANTABLE DEVICE
Type: IMPLANTABLE DEVICE | Status: FUNCTIONAL
Brand: AEQUALIS™ PERFORM REVERSED

## 2021-06-01 RX ORDER — ASCORBIC ACID 500 MG
1000 TABLET ORAL DAILY
Status: DISCONTINUED | OUTPATIENT
Start: 2021-06-02 | End: 2021-06-03 | Stop reason: HOSPADM

## 2021-06-01 RX ORDER — OXYCODONE HYDROCHLORIDE 5 MG/1
5 TABLET ORAL EVERY 4 HOURS PRN
Status: DISCONTINUED | OUTPATIENT
Start: 2021-06-01 | End: 2021-06-03 | Stop reason: HOSPADM

## 2021-06-01 RX ORDER — PROPOFOL 10 MG/ML
INJECTION, EMULSION INTRAVENOUS AS NEEDED
Status: DISCONTINUED | OUTPATIENT
Start: 2021-06-01 | End: 2021-06-01

## 2021-06-01 RX ORDER — FAMOTIDINE 20 MG/1
20 TABLET, FILM COATED ORAL DAILY
Status: DISCONTINUED | OUTPATIENT
Start: 2021-06-02 | End: 2021-06-03 | Stop reason: HOSPADM

## 2021-06-01 RX ORDER — SODIUM CHLORIDE, SODIUM LACTATE, POTASSIUM CHLORIDE, CALCIUM CHLORIDE 600; 310; 30; 20 MG/100ML; MG/100ML; MG/100ML; MG/100ML
125 INJECTION, SOLUTION INTRAVENOUS CONTINUOUS
Status: DISCONTINUED | OUTPATIENT
Start: 2021-06-01 | End: 2021-06-03

## 2021-06-01 RX ORDER — PREDNISONE 1 MG/1
5 TABLET ORAL DAILY
Status: DISCONTINUED | OUTPATIENT
Start: 2021-06-02 | End: 2021-06-03 | Stop reason: HOSPADM

## 2021-06-01 RX ORDER — EPHEDRINE SULFATE 50 MG/ML
INJECTION INTRAVENOUS AS NEEDED
Status: DISCONTINUED | OUTPATIENT
Start: 2021-06-01 | End: 2021-06-01

## 2021-06-01 RX ORDER — OXYCODONE HYDROCHLORIDE 5 MG/1
2.5 TABLET ORAL EVERY 4 HOURS PRN
Status: DISCONTINUED | OUTPATIENT
Start: 2021-06-01 | End: 2021-06-03 | Stop reason: HOSPADM

## 2021-06-01 RX ORDER — LANOLIN ALCOHOL/MO/W.PET/CERES
3 CREAM (GRAM) TOPICAL
Status: DISCONTINUED | OUTPATIENT
Start: 2021-06-01 | End: 2021-06-03 | Stop reason: HOSPADM

## 2021-06-01 RX ORDER — CEFAZOLIN SODIUM 1 G/50ML
1000 SOLUTION INTRAVENOUS EVERY 8 HOURS
Status: COMPLETED | OUTPATIENT
Start: 2021-06-01 | End: 2021-06-02

## 2021-06-01 RX ORDER — FOLIC ACID 1 MG/1
1000 TABLET ORAL DAILY
Status: DISCONTINUED | OUTPATIENT
Start: 2021-06-02 | End: 2021-06-03 | Stop reason: HOSPADM

## 2021-06-01 RX ORDER — HYDROXYCHLOROQUINE SULFATE 200 MG/1
200 TABLET, FILM COATED ORAL
Status: DISCONTINUED | OUTPATIENT
Start: 2021-06-02 | End: 2021-06-03 | Stop reason: HOSPADM

## 2021-06-01 RX ORDER — LIDOCAINE HYDROCHLORIDE 10 MG/ML
0.5 INJECTION, SOLUTION EPIDURAL; INFILTRATION; INTRACAUDAL; PERINEURAL ONCE AS NEEDED
Status: DISCONTINUED | OUTPATIENT
Start: 2021-06-01 | End: 2021-06-01 | Stop reason: HOSPADM

## 2021-06-01 RX ORDER — LEVOTHYROXINE SODIUM 0.03 MG/1
25 TABLET ORAL
Status: DISCONTINUED | OUTPATIENT
Start: 2021-06-02 | End: 2021-06-03 | Stop reason: HOSPADM

## 2021-06-01 RX ORDER — DOCUSATE SODIUM 100 MG/1
100 CAPSULE, LIQUID FILLED ORAL 2 TIMES DAILY
Status: DISCONTINUED | OUTPATIENT
Start: 2021-06-01 | End: 2021-06-03 | Stop reason: HOSPADM

## 2021-06-01 RX ORDER — SODIUM CHLORIDE, SODIUM LACTATE, POTASSIUM CHLORIDE, CALCIUM CHLORIDE 600; 310; 30; 20 MG/100ML; MG/100ML; MG/100ML; MG/100ML
100 INJECTION, SOLUTION INTRAVENOUS CONTINUOUS
Status: DISPENSED | OUTPATIENT
Start: 2021-06-01 | End: 2021-06-02

## 2021-06-01 RX ORDER — ONDANSETRON 2 MG/ML
INJECTION INTRAMUSCULAR; INTRAVENOUS AS NEEDED
Status: DISCONTINUED | OUTPATIENT
Start: 2021-06-01 | End: 2021-06-01

## 2021-06-01 RX ORDER — CALCIUM CARBONATE 200(500)MG
1000 TABLET,CHEWABLE ORAL DAILY PRN
Status: DISCONTINUED | OUTPATIENT
Start: 2021-06-01 | End: 2021-06-03 | Stop reason: HOSPADM

## 2021-06-01 RX ORDER — ROCURONIUM BROMIDE 10 MG/ML
INJECTION, SOLUTION INTRAVENOUS AS NEEDED
Status: DISCONTINUED | OUTPATIENT
Start: 2021-06-01 | End: 2021-06-01

## 2021-06-01 RX ORDER — LIDOCAINE HYDROCHLORIDE 10 MG/ML
INJECTION, SOLUTION EPIDURAL; INFILTRATION; INTRACAUDAL; PERINEURAL AS NEEDED
Status: DISCONTINUED | OUTPATIENT
Start: 2021-06-01 | End: 2021-06-01

## 2021-06-01 RX ORDER — DOCUSATE SODIUM 100 MG/1
100 CAPSULE, LIQUID FILLED ORAL 2 TIMES DAILY
Qty: 10 CAPSULE | Refills: 0 | Status: SHIPPED | OUTPATIENT
Start: 2021-06-01 | End: 2022-05-02

## 2021-06-01 RX ORDER — CEFAZOLIN SODIUM 2 G/50ML
2000 SOLUTION INTRAVENOUS ONCE
Status: COMPLETED | OUTPATIENT
Start: 2021-06-01 | End: 2021-06-01

## 2021-06-01 RX ORDER — ONDANSETRON 2 MG/ML
4 INJECTION INTRAMUSCULAR; INTRAVENOUS ONCE AS NEEDED
Status: DISCONTINUED | OUTPATIENT
Start: 2021-06-01 | End: 2021-06-01 | Stop reason: HOSPADM

## 2021-06-01 RX ORDER — CHLORHEXIDINE GLUCONATE 0.12 MG/ML
15 RINSE ORAL ONCE
Status: COMPLETED | OUTPATIENT
Start: 2021-06-01 | End: 2021-06-01

## 2021-06-01 RX ORDER — BUPIVACAINE HYDROCHLORIDE 5 MG/ML
INJECTION, SOLUTION EPIDURAL; INTRACAUDAL AS NEEDED
Status: DISCONTINUED | OUTPATIENT
Start: 2021-06-01 | End: 2021-06-01

## 2021-06-01 RX ORDER — OXYCODONE HYDROCHLORIDE 5 MG/1
TABLET ORAL
Qty: 13 TABLET | Refills: 0 | Status: SHIPPED | OUTPATIENT
Start: 2021-06-01 | End: 2021-06-03 | Stop reason: SDUPTHER

## 2021-06-01 RX ORDER — ACETAMINOPHEN 325 MG/1
650 TABLET ORAL EVERY 6 HOURS PRN
Status: DISCONTINUED | OUTPATIENT
Start: 2021-06-01 | End: 2021-06-03 | Stop reason: HOSPADM

## 2021-06-01 RX ORDER — ONDANSETRON 2 MG/ML
4 INJECTION INTRAMUSCULAR; INTRAVENOUS EVERY 6 HOURS PRN
Status: DISCONTINUED | OUTPATIENT
Start: 2021-06-01 | End: 2021-06-03 | Stop reason: HOSPADM

## 2021-06-01 RX ORDER — FENTANYL CITRATE 50 UG/ML
INJECTION, SOLUTION INTRAMUSCULAR; INTRAVENOUS AS NEEDED
Status: DISCONTINUED | OUTPATIENT
Start: 2021-06-01 | End: 2021-06-01

## 2021-06-01 RX ORDER — GABAPENTIN 300 MG/1
300 CAPSULE ORAL 3 TIMES DAILY
Status: DISCONTINUED | OUTPATIENT
Start: 2021-06-01 | End: 2021-06-03 | Stop reason: HOSPADM

## 2021-06-01 RX ORDER — NORTRIPTYLINE HYDROCHLORIDE 50 MG/1
50 CAPSULE ORAL
Status: DISCONTINUED | OUTPATIENT
Start: 2021-06-01 | End: 2021-06-03 | Stop reason: HOSPADM

## 2021-06-01 RX ORDER — HYDROMORPHONE HCL/PF 1 MG/ML
0.5 SYRINGE (ML) INJECTION EVERY 2 HOUR PRN
Status: DISCONTINUED | OUTPATIENT
Start: 2021-06-01 | End: 2021-06-03

## 2021-06-01 RX ORDER — FENTANYL CITRATE/PF 50 MCG/ML
25 SYRINGE (ML) INJECTION
Status: COMPLETED | OUTPATIENT
Start: 2021-06-01 | End: 2021-06-01

## 2021-06-01 RX ORDER — VITAMIN E 268 MG
400 CAPSULE ORAL DAILY
Status: DISCONTINUED | OUTPATIENT
Start: 2021-06-02 | End: 2021-06-03 | Stop reason: HOSPADM

## 2021-06-01 RX ORDER — MELATONIN
1000 DAILY
Status: DISCONTINUED | OUTPATIENT
Start: 2021-06-02 | End: 2021-06-03 | Stop reason: HOSPADM

## 2021-06-01 RX ORDER — HYDROXYCHLOROQUINE SULFATE 200 MG/1
100 TABLET, FILM COATED ORAL EVERY EVENING
Status: DISCONTINUED | OUTPATIENT
Start: 2021-06-01 | End: 2021-06-03 | Stop reason: HOSPADM

## 2021-06-01 RX ORDER — HYDROMORPHONE HCL IN WATER/PF 6 MG/30 ML
0.2 PATIENT CONTROLLED ANALGESIA SYRINGE INTRAVENOUS
Status: DISCONTINUED | OUTPATIENT
Start: 2021-06-01 | End: 2021-06-01 | Stop reason: HOSPADM

## 2021-06-01 RX ORDER — ALBUMIN, HUMAN INJ 5% 5 %
SOLUTION INTRAVENOUS CONTINUOUS PRN
Status: DISCONTINUED | OUTPATIENT
Start: 2021-06-01 | End: 2021-06-01

## 2021-06-01 RX ADMIN — CEFAZOLIN SODIUM 1000 MG: 1 SOLUTION INTRAVENOUS at 17:50

## 2021-06-01 RX ADMIN — LIDOCAINE HYDROCHLORIDE 20 MG: 10 INJECTION, SOLUTION EPIDURAL; INFILTRATION; INTRACAUDAL; PERINEURAL at 10:31

## 2021-06-01 RX ADMIN — ROCURONIUM BROMIDE 20 MG: 50 INJECTION, SOLUTION INTRAVENOUS at 10:32

## 2021-06-01 RX ADMIN — SUGAMMADEX 160 MG: 100 INJECTION, SOLUTION INTRAVENOUS at 12:04

## 2021-06-01 RX ADMIN — Medication 25 MCG: at 12:46

## 2021-06-01 RX ADMIN — FENTANYL CITRATE 50 MCG: 50 INJECTION INTRAMUSCULAR; INTRAVENOUS at 09:44

## 2021-06-01 RX ADMIN — PHENYLEPHRINE HYDROCHLORIDE 250 MCG: 10 INJECTION INTRAVENOUS at 10:45

## 2021-06-01 RX ADMIN — SODIUM CHLORIDE, SODIUM LACTATE, POTASSIUM CHLORIDE, AND CALCIUM CHLORIDE 100 ML/HR: .6; .31; .03; .02 INJECTION, SOLUTION INTRAVENOUS at 13:05

## 2021-06-01 RX ADMIN — PHENYLEPHRINE HYDROCHLORIDE 100 MCG: 10 INJECTION INTRAVENOUS at 11:13

## 2021-06-01 RX ADMIN — SODIUM CHLORIDE, SODIUM LACTATE, POTASSIUM CHLORIDE, AND CALCIUM CHLORIDE: .6; .31; .03; .02 INJECTION, SOLUTION INTRAVENOUS at 11:10

## 2021-06-01 RX ADMIN — BUPIVACAINE 20 ML: 13.3 INJECTION, SUSPENSION, LIPOSOMAL INFILTRATION at 09:46

## 2021-06-01 RX ADMIN — Medication 25 MCG: at 13:15

## 2021-06-01 RX ADMIN — PHENYLEPHRINE HYDROCHLORIDE 250 MCG: 10 INJECTION INTRAVENOUS at 10:53

## 2021-06-01 RX ADMIN — PHENYLEPHRINE HYDROCHLORIDE 100 MCG: 10 INJECTION INTRAVENOUS at 11:03

## 2021-06-01 RX ADMIN — Medication 25 MCG: at 12:40

## 2021-06-01 RX ADMIN — PHENYLEPHRINE HYDROCHLORIDE 100 MCG: 10 INJECTION INTRAVENOUS at 11:06

## 2021-06-01 RX ADMIN — PHENYLEPHRINE HYDROCHLORIDE 100 MCG: 10 INJECTION INTRAVENOUS at 11:10

## 2021-06-01 RX ADMIN — EPHEDRINE SULFATE 10 MG: 50 INJECTION, SOLUTION INTRAVENOUS at 11:26

## 2021-06-01 RX ADMIN — SODIUM CHLORIDE, SODIUM LACTATE, POTASSIUM CHLORIDE, AND CALCIUM CHLORIDE: .6; .31; .03; .02 INJECTION, SOLUTION INTRAVENOUS at 10:23

## 2021-06-01 RX ADMIN — GABAPENTIN 300 MG: 300 CAPSULE ORAL at 22:45

## 2021-06-01 RX ADMIN — GABAPENTIN 300 MG: 300 CAPSULE ORAL at 15:58

## 2021-06-01 RX ADMIN — DOCUSATE SODIUM 100 MG: 100 CAPSULE ORAL at 17:54

## 2021-06-01 RX ADMIN — ALBUMIN (HUMAN): 12.5 INJECTION, SOLUTION INTRAVENOUS at 11:30

## 2021-06-01 RX ADMIN — EPHEDRINE SULFATE 5 MG: 50 INJECTION, SOLUTION INTRAVENOUS at 11:22

## 2021-06-01 RX ADMIN — PHENYLEPHRINE HYDROCHLORIDE 100 MCG: 10 INJECTION INTRAVENOUS at 11:00

## 2021-06-01 RX ADMIN — CEFAZOLIN SODIUM 1000 MG: 2 SOLUTION INTRAVENOUS at 10:29

## 2021-06-01 RX ADMIN — Medication 25 MCG: at 13:00

## 2021-06-01 RX ADMIN — BUPIVACAINE HYDROCHLORIDE 6 ML: 5 INJECTION, SOLUTION EPIDURAL; INTRACAUDAL at 09:46

## 2021-06-01 RX ADMIN — ONDANSETRON 4 MG: 2 INJECTION INTRAMUSCULAR; INTRAVENOUS at 11:59

## 2021-06-01 RX ADMIN — PROPOFOL 80 MG: 10 INJECTION, EMULSION INTRAVENOUS at 10:31

## 2021-06-01 RX ADMIN — PHENYLEPHRINE HYDROCHLORIDE 100 MCG: 10 INJECTION INTRAVENOUS at 10:41

## 2021-06-01 RX ADMIN — MELATONIN TAB 3 MG 3 MG: 3 TAB at 23:43

## 2021-06-01 RX ADMIN — EPHEDRINE SULFATE 15 MG: 50 INJECTION, SOLUTION INTRAVENOUS at 10:57

## 2021-06-01 RX ADMIN — HYDROCORTISONE SODIUM SUCCINATE 50 MG: 100 INJECTION, POWDER, FOR SOLUTION INTRAMUSCULAR; INTRAVENOUS at 10:37

## 2021-06-01 RX ADMIN — CHLORHEXIDINE GLUCONATE 15 ML: 1.2 SOLUTION ORAL at 06:24

## 2021-06-01 NOTE — ANESTHESIA PROCEDURE NOTES
Peripheral Block    Patient location during procedure: holding area  Start time: 6/1/2021 9:46 AM  Reason for block: at surgeon's request and post-op pain management  Staffing  Anesthesiologist: Booker Burr MD  Preanesthetic Checklist  Completed: patient identified, site marked, surgical consent, pre-op evaluation, timeout performed, IV checked, risks and benefits discussed and monitors and equipment checked  Peripheral Block  Patient position: sitting  Prep: ChloraPrep  Patient monitoring: continuous pulse ox and frequent blood pressure checks  Block type: interscalene  Laterality: left  Injection technique: single-shot  Procedures: ultrasound guided, Ultrasound guidance required for the procedure to increase accuracy and safety of medication placement and decrease risk of complications    Ultrasound permanent image saved  Needle  Needle type: Stimuplex   Needle gauge: 22 G  Needle length: 5 cm  Needle localization: anatomical landmarks and ultrasound guidance  Test dose: negative  Assessment  Injection assessment: incremental injection, local visualized surrounding nerve on ultrasound, negative aspiration for CSF, negative aspiration for heme and no paresthesia on injection  Paresthesia pain: none  Heart rate change: no  Slow fractionated injection: yes  Post-procedure:  site cleaned  patient tolerated the procedure well with no immediate complications

## 2021-06-01 NOTE — CONSULTS
Internal Medicine  Consultation Note    Patient: Monika Rivera  Age/sex: 67 y o  female  Medical Record #: [id number]    Assessment/Plan    Status Post left reverse Total Shoulder Arthroplasty   Continue post op pain control measures as prescribed   Follow bowel regimen to help decrease narcotic induced constipation    Follow post operative hemoglobin with serial CBC and treat accordingly   Monitor WBC and fever curve post op while encouraging use of incentive spirometer   DVT prophylaxis in place and reviewed  Rheumatoid arthritis  · Takes plaquenil 200mg in a m  100mg pm; prednisone 5mg daily and methotrexate 7 5mg on thursdays  · Will cont prednisone; hold plaquenil for now    Lupus  · Same as above    Hypothyroid  · Continue levothyroxine 25mcg daily    GERD   Cont PPI   Monitor for nausea/vomiting    Chronic anemia  · Start venofer x 2 doses      PRE-OP HGB LEVEL: 11 8    Subjective/ HPI: Dwayne Pacheco was seen and examined  Hx of shoulder pain failed out patient conservative measures  Elected to undergo total shoulder arthroplasty We are asked to see patient for post op management of underlying medical co-morbidities as outlined above  Pt did well intra and post operatively with good hemodynamics  Pt currently comfortable and without any reported post op nausea  ROS:   A 10 point ROS was performed; negative except as noted above       Social History:    Substance Use History:   Social History     Substance and Sexual Activity   Alcohol Use Not Currently    Frequency: Never     Social History     Tobacco Use   Smoking Status Former Smoker   Smokeless Tobacco Never Used   Tobacco Comment    quit 20-30 years ago     Social History     Substance and Sexual Activity   Drug Use Not Currently       Family History:    non-contributory      Review of Scheduled Meds:  Current Facility-Administered Medications   Medication Dose Route Frequency Provider Last Rate    bupivacaine liposomal  20 mL Infiltration Once Jamie Shine MD      cefazolin  2,000 mg Intravenous Once Mirtha Baltazar MD      lactated ringers  125 mL/hr Intravenous Continuous Jamie Shine MD      lidocaine (PF)  0 5 mL Infiltration Once PRN Jamie Shine MD         Labs: Invalid input(s): LABGLOM, CMP                   Lab Results   Component Value Date    BLOODCX No Growth After 5 Days  2019    BLOODCX No Growth After 5 Days  2019    BLOODCX No Growth After 5 Days  2019    URINECX <10,000 cfu/ml  2021    URINECX >100,000 cfu/ml Proteus mirabilis (A) 2021    URINECX >100,000 cfu/ml Proteus mirabilis (A) 2019    URINECX 30,000-39,000 cfu/ml Escherichia coli (A) 2019       Input and Output Summary (last 24 hours):     No intake or output data in the 24 hours ending 21 0856    Imaging:     No orders to display       *Labs /Radiology studiesLabs reviewed  *Medications reviewed and reconciled as needed  *Please refer to order section for additional ordered labs studies  *Case discussed with primary attending during morning huddle case rounds    Vitals:   Temp (24hrs), Av 1 °F (35 6 °C), Min:96 1 °F (35 6 °C), Max:96 1 °F (35 6 °C)    Temp:  [96 1 °F (35 6 °C)] 96 1 °F (35 6 °C)  HR:  [65] 65  Resp:  [16] 16  BP: (139)/(73) 139/73  SpO2:  [100 %] 100 %  Body mass index is 20 69 kg/m²       Physical Exam:   General Appearance: no distress, conversive; frail  HEENT: PERRLA, conjuctiva normal; oropharynx clear; mucous membranes moist;   Neck:  Supple, no lymphadenopathy or thyromegaly  Lungs: CTA, normal respiratory effort, no retractions, expiratory effort normal  CV: regular rate and rhythm , PMI normal   ABD: soft non tender, no masses , no hepatic or splenomegaly  EXT: DP pulses intact, no lymphadenopathy, no edema ;  left shoulder dressing in place  Skin: normal turgor, normal texture, no rash  Psych: affect normal, mood normal  Neuro: AAOx3          Invasive Devices None                    Code Status: Prior  Current Length of Stay: 0 day(s)    Total floor / unit time spent today 45 minutes with more than 50% spent counseling/coordinating care  Counseling includes discussion with patient re: progress  and discussion with patient of his/her current medical state/information  Coordination of patient's care was performed in conjunction with primary service  Time invested included review of patient's labs, vitals, and management of their comorbidities with continued monitoring  In addition, this patient was discussed with medical team including physician and advanced extenders  The care of the patient was extensively discussed and appropriate treatment plan was formulated unique for this patient  ** Please Note: Fluency Direct voice to text software may have been used in the creation of this document   Audio transcription errors may occur**

## 2021-06-01 NOTE — ANESTHESIA PREPROCEDURE EVALUATION
Procedure:  ARTHROPLASTY SHOULDER REVERSE (Left Shoulder)    Relevant Problems   ANESTHESIA (within normal limits)   (-) History of anesthesia complications      CARDIO   (+) RBBB   (-) Chest pain   (-) REYES (dyspnea on exertion)      ENDO   (+) Hypothyroidism due to Hashimoto's thyroiditis      GI/HEPATIC   (-) Gastroesophageal reflux disease      /RENAL (within normal limits)      MUSCULOSKELETAL   (+) Other forms of systemic lupus erythematosus (HCC)   (+) Rheumatoid arthritis involving multiple sites with positive rheumatoid factor (HCC)      NEURO/PSYCH  polio as a child- now with BLE weakness, Left hand weakness, right hand paresthesia   (+) Anxiety   (+) Chronic pain syndrome   (+) Depression      PULMONARY   (-) Shortness of breath   (-) URI (upper respiratory infection)        Physical Exam    Airway    Mallampati score: II  TM Distance: <3 FB  Neck ROM: full     Dental       Cardiovascular      Pulmonary      Other Findings        Anesthesia Plan  ASA Score- 3     Anesthesia Type- general and regional with ASA Monitors  Additional Monitors:   Airway Plan: ETT  Plan Factors-Exercise tolerance (METS): <4 METS  Chart reviewed  EKG reviewed  Existing labs reviewed  Induction- intravenous  Postoperative Plan-     Informed Consent- Anesthetic plan and risks discussed with patient  I personally reviewed this patient with the CRNA  Discussed and agreed on the Anesthesia Plan with the CRNA  Jodie Ray

## 2021-06-01 NOTE — ANESTHESIA POSTPROCEDURE EVALUATION
Post-Op Assessment Note    CV Status:  Stable  Pain Score: 0    Pain management: adequate     Mental Status:  Confused   Hydration Status:  Stable   PONV Controlled:  None   Airway Patency:  Patent      Post Op Vitals Reviewed: Yes      Staff: CRNA         No complications documented      BP   157/98   Temp  36 4   Pulse 81   Resp   16   SpO2   96

## 2021-06-01 NOTE — H&P
I identified and marked the patient in the pre-op holding area after confirming the surgical consent  No changes to medical health since the H&P was preformed  The [facility] ensure compliance with applicable state laws

## 2021-06-01 NOTE — OP NOTE
OPERATIVE REPORT  PATIENT NAME: Yahir Rivera    :  1945  MRN: [id number]  Pt Location:  OR ROOM 15    SURGERY DATE: 2021     SURGEON: Odilon Montoya MD     ASSISTANT: Shannan Blankenship PA-C     NOTE: Shannan Blankenship PA-C was present throughout the entire procedure and performed essential assistance with patient prepping, draping, positioning, trial implantation/range of motion, final implant insertion, careful retraction, wound closure, sterile dressing application and sling application, all under my direct supervision  NOTE: No qualified resident physician was available for assistance    PREOPERATIVE DIAGNOSIS: Left Shoulder Rotator Cuff Tear Arthropathy with Anterior Instability    POSTOPERATIVE DIAGNOSIS: Same    PROCEDURES: Left Reverse Total Shoulder Arthroplasty with Long Head Biceps Tenodesis    ANESTHESIA STAFF: Isaiah Rea MD     ANESTHESIA TYPE: General with endotracheal tube with ultrasound guided interscalene block (Exparel)  The interscalene block was provided by the anesthesia staff per my request for postoperative pain control and to decrease the use of postoperative narcotic medication for pain control  COMPLICATIONS: None    FINDINGS: Rotator Cuff Tear Arthropathy with Severe Anterior Glenoid Bone Loss    SPECIMEN(S): None    ESTIMATED BLOOD LOSS: Minimal    INDICATIONS FOR PROCEDURE:  The patient is a 67 y o  female presenting with pain and lack of function secondary to rotator cuff tear arthropathy with chronic anterior instability of the left shoulder  After a thorough discussion of the risks and benefits of operative and nonoperative care the patient elected for left reverse total shoulder arthroplasty  Patient was noted to have significant anterior glenoid bone loss on the preoperative imaging in augmented base plate was planned to help reconstruct the anterior glenoid bone loss  Informed consent was obtained in the office      OPERATIVE TECHNIQUE:  The day of surgery I identified the patient's left shoulder and marked it with my initials  The patient was taken back to the operating room where anesthesia was induced by the anesthesia staff without complication  The patient was placed in the beachchair position with all bony prominences padded  The left shoulder was prepped and draped in routine sterile fashion and after a time-out for safety and confirming 2 grams of IV Cefazolin were given, a standard deltopectoral approach was performed  The dissection was carried down to the subscapularis which was partially torn  The remnant was released and tagged for repair to the anterior humerus later in the case    The long head of biceps had severe tenosynovitis and degeneration, so it was released and tagged for later tenodesis to the anterior humerus at the end of the case  The humeral head was exposed and found to have severe degenerative change with no remaining rotator cuff attached  The humerus was prepared keeping with the surgical technique for a Tornier Flex reverse prosthesis  After preparing the humerus the glenoid was exposed and found to have severe anterior glenoid bone loss, this was much more significant than the preoperative imaging had suggested  There was only a small rim of posterior and superior glenoid upon which to see a base plate and a half wedge base plate was then utilized to help augment as much of the defect as possible, bone graft was also cut from the humeral head and placed in the defect to help support the anterior base plate, even by using the wedge and the bone graft the base plate had be placed in significant anteversion in order to have secure fixation to the deficient anterior glenoid  It was not possible to place the base plate in standard version given this severe deformity  The glenoid was prepared for a 25 mm half wedge augmented Tornier Perform Plus baseplate    The baseplate was placed and a central screw was not possible given the anteversion the base plate had to be placed in so a 7 mm post was utilized for Press-Fit  Additional fixation was achieved with a 18 mm non-locking screw followed by a superior 18 mm, a posterior 14 mm and an inferior 22 mm locking screws  These achieved excellent fixation of the baseplate to the glenoid  A 36 mm +3 mm lateralized glenosphere was then fixated to the baseplate  The humerus was finished and a size 1A Tornier Flex Stem with a high offset tray and a +6 mm C angle polyethylene (135 degree construct) was trialed and found to have excellent stability with slightly limited internal rotation based on the anteversion of the base plate and glenosphere however there was adequate functional range of motion and appropriate soft tissue tension  Final implants were placed and the area was irrigated with pulse lavage  The subscapularis remnant was repaired to the anterior humerus and the long head biceps was tenodesed to the anterior humerus with Orthocord sutures  The deltopectoral interval was loosely closed with 0 Vicryl and the subdermal layer with 2-0 Vicryl with staples for skin  Sterile dressings and a sling with abduction pillow was placed and the patient was awoken  The patient was transported to the recovery room in good condition and will be admitted for post-operative care and physical therapy will be initiated following the standard reverse total shoulder arthroplasty protocol      PATIENT DISPOSITION:  Stable to PACU      SIGNATURE: Dieudonne Ovalle MD  DATE: June 1, 2021  TIME: 12:16 PM

## 2021-06-01 NOTE — DISCHARGE INSTRUCTIONS
What to Expect Before and After Shoulder Replacement Surgery  You are being scheduled for a shoulder replacement by Dr Cathleen Bonilla to treat your shoulder condition  Here is some information which may help to answer questions that you may have  Please do not hesitate to reach out to our team to answer questions not addressed here  Before Surgery  You will be contacted the evening prior to your surgery to confirm the scheduled time of the procedure and when to arrive at the hospital    Do not eat or drink anything after midnight the night before your surgery so that the anesthesia can be performed safely  If you have been fitted for a sling in the office prior to the surgery please remember to bring it to the hospital   You will meet the anesthesiologist the morning of the surgery  The surgery is performed under a general anesthetic but they will also offer you a regional block (shot to numb the arm) to help control your post-operative pain as well as with a catheter that is left in place after the block  The catheter is connected to a small pump which will continue to provide numbing medicine and help prolong the pain control from the block  Unfortunately this catheter is not as effective as the initial block, but can still be very helpful in managing the pain  After Surgery and in the Hospital  The shoulder replacement surgery typically takes 60-90 minutes  When surgery is completed, your surgeon will update your family and friends on your condition and progress  You will remain in the recovery room for at least an hour or until the anesthesia has worn off and your blood pressure and pulse are stable  If you have pain, the nurses will give you medication  Once out of surgery, your surgeon will decide on how long you will be using a sling in order to protect and position your shoulder  However, this won't keep you from starting physical therapy    Exercises typically begin on the day after surgery with emphasis on moving the shoulder, wrist, and hand  The physical therapist will be provided with a detailed protocol but typically the first 6 weeks are used to regain range of motion and then strengthening is initiated  Starting strengthening exercises too early may lead to complications  When You Are Discharged from the [address] [place] [address] can expect to be released from the hospital the day after surgery (this may change if you have special needs or medical conditions)  Before you are released, the treatment team (Orthopaedic surgery residents, physician assistants and physical therapists) will talk with you about the importance of limiting any sudden or stressful movements to the arm for several weeks or longer  Activities that involve pushing, pulling, and lifting should not be done until you are given permission from your surgeon  Your First Day at [address] [place] [address] may need help with your daily activities, so it is a good idea to have family and friends prepared to help you  It is okay to remove the sling and let the arm hang at the side so that you can get cleaned and change your clothes  To put on a shirt, place the bad arm in first and then the good arm  Reverse to take it off  Don't forget to wear the sling every night for at least the first month after surgery, and never use your arm to push yourself up in bed or from a chair  The added weight on your shoulder may cause you to re-injure the joint  How to [place] in the First Week  You are encouraged to return to your normal eating and sleeping patterns as soon as possible  It is important for you to be active in order to control your weight and muscle tone  It is ok to increase your activity level and even perform light aerobic exercise (like walking or riding a stationary bike) within the first week or so if you are feeling up to it    If there is concern about these activates best to wait until the first post-operative visit and discuss this with the team    Patricia Webb might be able to return to work within several days if you can perform your job while wearing a sling  Consult with your doctor, as this differs from patient to patient  However, if your job requires heavy lifting or climbing, there may be a delay for several months  Until you are seen for your first follow-up visit, please try and keep wound dry  It is okay to shower but try your best to keep the incision out of direct contact with the water (or consider using a waterproof bandage)  If it does gets wet please dry as best as possible afterwards  If you notice any drainage or a foul odor from your incision or your temperature goes above 101 5 degrees, please contact the office  What You Can Expect in the First Month  Your first post-operative appointment will be with Dr Griselda Crocker physician assistant (PA) around 2 weeks after the surgery  You skin staples will be removed and X-rays will be obtained at that visit  Please understand that it is quite common to still experience pain at that time but the pain should be steadily improving  Hopefully physical therapy has already begun but if not it will be initiated at this visit and will continue for the 8-12 weeks  At about 12 weeks after surgery you will start a progressive strengthening program  Physical therapy is a deliberate process of not only strengthening your shoulder but also altering how you use your arm  It may be many months before your desired results are achieved, so do not get discouraged  Your shoulder will generally continue to improve steadily up to 6-8 months after surgery  After that point further improvement is very slow; although it has been shown that even after a year or more, activity can increase as muscle strength continues to improve  After the First Month at Home   Because each person heals differently, there are different recovery timelines  An average recovery period typically lasts about between 3-6 months  Talk with your surgeon about which activities will be appropriate for you once you have recovered

## 2021-06-01 NOTE — PLAN OF CARE
Problem: Potential for Falls  Goal: Patient will remain free of falls  Description: INTERVENTIONS:  - Assess patient frequently for physical needs  -  Identify cognitive and physical deficits and behaviors that affect risk of falls    -  [place] fall precautions as indicated by assessment   - Educate patient/family on patient safety including physical limitations  - Instruct patient to call for assistance with activity based on assessment  - Modify environment to reduce risk of injury  - Consider OT/PT consult to assist with strengthening/mobility  Outcome: Progressing     Problem: PAIN - ADULT  Goal: Verbalizes/displays adequate comfort level or baseline comfort level  Description: Interventions:  - Encourage patient to monitor pain and request assistance  - Assess pain using appropriate pain scale  - Administer analgesics based on type and severity of pain and evaluate response  - Implement non-pharmacological measures as appropriate and evaluate response  - Consider cultural and social influences on pain and pain management  - Notify physician/advanced practitioner if interventions unsuccessful or patient reports new pain  Outcome: Progressing     Problem: INFECTION - ADULT  Goal: Absence or prevention of progression during hospitalization  Description: INTERVENTIONS:  - Assess and monitor for signs and symptoms of infection  - Monitor lab/diagnostic results  - Monitor all insertion sites, i e  indwelling lines, tubes, and drains  - Monitor endotracheal if appropriate and nasal secretions for changes in amount and color  - [place] appropriate cooling/warming therapies per order  - Administer medications as ordered  - Instruct and encourage patient and family to use good hand hygiene technique  - Identify and instruct in appropriate isolation precautions for identified infection/condition  Outcome: Progressing  Goal: Absence of fever/infection during neutropenic period  Description: INTERVENTIONS:  - Monitor WBC    Outcome: Progressing     Problem: SAFETY ADULT  Goal: Patient will remain free of falls  Description: INTERVENTIONS:  - Assess patient frequently for physical needs  -  Identify cognitive and physical deficits and behaviors that affect risk of falls    -  [place] fall precautions as indicated by assessment   - Educate patient/family on patient safety including physical limitations  - Instruct patient to call for assistance with activity based on assessment  - Modify environment to reduce risk of injury  - Consider OT/PT consult to assist with strengthening/mobility  Outcome: Progressing  Goal: Maintain or return to baseline ADL function  Description: INTERVENTIONS:  -  Assess patient's ability to carry out ADLs; assess patient's baseline for ADL function and identify physical deficits which impact ability to perform ADLs (bathing, care of mouth/teeth, toileting, grooming, dressing, etc )  - Assess/evaluate cause of self-care deficits   - Assess range of motion  - Assess patient's mobility; develop plan if impaired  - Assess patient's need for assistive devices and provide as appropriate  - Encourage maximum independence but intervene and supervise when necessary  - Involve family in performance of ADLs  - Assess for home care needs following discharge   - Consider OT consult to assist with ADL evaluation and planning for discharge  - Provide patient education as appropriate  Outcome: Progressing  Goal: Maintain or return mobility status to optimal level  Description: INTERVENTIONS:  - Assess patient's baseline mobility status (ambulation, transfers, stairs, etc )    - Identify cognitive and physical deficits and behaviors that affect mobility  - Identify mobility aids required to assist with transfers and/or ambulation (gait belt, sit-to-stand, lift, walker, cane, etc )  - [place] fall precautions as indicated by assessment  - Record patient progress and toleration of activity level on Mobility SBAR; progress patient to next Phase/Stage  - Instruct patient to call for assistance with activity based on assessment  - Consider rehabilitation consult to assist with strengthening/weightbearing, etc   Outcome: Progressing     Problem: DISCHARGE PLANNING  Goal: Discharge to home or other facility with appropriate resources  Description: INTERVENTIONS:  - Identify barriers to discharge w/patient and caregiver  - Arrange for needed discharge resources and transportation as appropriate  - Identify discharge learning needs (meds, wound care, etc )  - Arrange for interpretive services to assist at discharge as needed  - Refer to Case Management Department for coordinating discharge planning if the patient needs post-hospital services based on physician/advanced practitioner order or complex needs related to functional status, cognitive ability, or social support system  Outcome: Progressing     Problem: Knowledge Deficit  Goal: Patient/family/caregiver demonstrates understanding of disease process, treatment plan, medications, and discharge instructions  Description: Complete learning assessment and assess knowledge base    Interventions:  - Provide teaching at level of understanding  - Provide teaching via preferred learning methods  Outcome: Progressing

## 2021-06-02 DIAGNOSIS — E03.9 HYPOTHYROIDISM, UNSPECIFIED TYPE: ICD-10-CM

## 2021-06-02 LAB
ANION GAP SERPL CALCULATED.3IONS-SCNC: 12 MMOL/L (ref 4–13)
BUN SERPL-MCNC: 5 MG/DL (ref 5–25)
CALCIUM SERPL-MCNC: 6.9 MG/DL (ref 8.3–10.1)
CHLORIDE SERPL-SCNC: 97 MMOL/L (ref 100–108)
CO2 SERPL-SCNC: 22 MMOL/L (ref 21–32)
CREAT SERPL-MCNC: 0.81 MG/DL (ref 0.6–1.3)
ERYTHROCYTE [DISTWIDTH] IN BLOOD BY AUTOMATED COUNT: 14.6 % (ref 11.6–15.1)
GFR SERPL CREATININE-BSD FRML MDRD: 71 ML/MIN/1.73SQ M
GLUCOSE SERPL-MCNC: 265 MG/DL (ref 65–140)
HCT VFR BLD AUTO: 34.2 % (ref 34.8–46.1)
HGB BLD-MCNC: 10.4 G/DL (ref 11.5–15.4)
MCH RBC QN AUTO: 29.2 PG (ref 26.8–34.3)
MCHC RBC AUTO-ENTMCNC: 30.4 G/DL (ref 31.4–37.4)
MCV RBC AUTO: 96 FL (ref 82–98)
PLATELET # BLD AUTO: 289 THOUSANDS/UL (ref 149–390)
PMV BLD AUTO: 8.1 FL (ref 8.9–12.7)
POTASSIUM SERPL-SCNC: 3.5 MMOL/L (ref 3.5–5.3)
RBC # BLD AUTO: 3.56 MILLION/UL (ref 3.81–5.12)
SODIUM SERPL-SCNC: 131 MMOL/L (ref 136–145)
WBC # BLD AUTO: 9.3 THOUSAND/UL (ref 4.31–10.16)

## 2021-06-02 PROCEDURE — 85027 COMPLETE CBC AUTOMATED: CPT | Performed by: PHYSICIAN ASSISTANT

## 2021-06-02 PROCEDURE — 99024 POSTOP FOLLOW-UP VISIT: CPT | Performed by: PHYSICIAN ASSISTANT

## 2021-06-02 PROCEDURE — 97167 OT EVAL HIGH COMPLEX 60 MIN: CPT

## 2021-06-02 PROCEDURE — 97163 PT EVAL HIGH COMPLEX 45 MIN: CPT

## 2021-06-02 PROCEDURE — 80048 BASIC METABOLIC PNL TOTAL CA: CPT | Performed by: PHYSICIAN ASSISTANT

## 2021-06-02 PROCEDURE — 99232 SBSQ HOSP IP/OBS MODERATE 35: CPT | Performed by: INTERNAL MEDICINE

## 2021-06-02 RX ORDER — LEVOTHYROXINE SODIUM 0.03 MG/1
TABLET ORAL
Qty: 90 TABLET | Refills: 1 | Status: SHIPPED | OUTPATIENT
Start: 2021-06-02 | End: 2021-11-29 | Stop reason: SDUPTHER

## 2021-06-02 RX ADMIN — OXYCODONE HYDROCHLORIDE 5 MG: 5 TABLET ORAL at 05:00

## 2021-06-02 RX ADMIN — NORTRIPTYLINE HYDROCHLORIDE 50 MG: 50 CAPSULE ORAL at 22:11

## 2021-06-02 RX ADMIN — FOLIC ACID 1000 MCG: 1 TABLET ORAL at 08:14

## 2021-06-02 RX ADMIN — GABAPENTIN 300 MG: 300 CAPSULE ORAL at 22:10

## 2021-06-02 RX ADMIN — HYDROXYCHLOROQUINE SULFATE 200 MG: 200 TABLET, FILM COATED ORAL at 08:15

## 2021-06-02 RX ADMIN — GABAPENTIN 300 MG: 300 CAPSULE ORAL at 16:03

## 2021-06-02 RX ADMIN — ACETAMINOPHEN 650 MG: 325 TABLET, FILM COATED ORAL at 22:10

## 2021-06-02 RX ADMIN — ONDANSETRON 4 MG: 2 INJECTION INTRAMUSCULAR; INTRAVENOUS at 20:41

## 2021-06-02 RX ADMIN — IRON SUCROSE 200 MG: 20 INJECTION, SOLUTION INTRAVENOUS at 10:16

## 2021-06-02 RX ADMIN — MELATONIN TAB 3 MG 3 MG: 3 TAB at 22:10

## 2021-06-02 RX ADMIN — OXYCODONE HYDROCHLORIDE 5 MG: 5 TABLET ORAL at 16:04

## 2021-06-02 RX ADMIN — ACETAMINOPHEN 650 MG: 325 TABLET, FILM COATED ORAL at 11:35

## 2021-06-02 RX ADMIN — OXYCODONE HYDROCHLORIDE AND ACETAMINOPHEN 1000 MG: 500 TABLET ORAL at 08:14

## 2021-06-02 RX ADMIN — DOCUSATE SODIUM 100 MG: 100 CAPSULE ORAL at 17:18

## 2021-06-02 RX ADMIN — ACETAMINOPHEN 650 MG: 325 TABLET, FILM COATED ORAL at 00:45

## 2021-06-02 RX ADMIN — Medication 1 TABLET: at 08:14

## 2021-06-02 RX ADMIN — OXYCODONE HYDROCHLORIDE 5 MG: 5 TABLET ORAL at 00:45

## 2021-06-02 RX ADMIN — FAMOTIDINE 20 MG: 20 TABLET ORAL at 08:14

## 2021-06-02 RX ADMIN — DOCUSATE SODIUM 100 MG: 100 CAPSULE ORAL at 08:14

## 2021-06-02 RX ADMIN — CEFAZOLIN SODIUM 1000 MG: 1 SOLUTION INTRAVENOUS at 03:40

## 2021-06-02 RX ADMIN — GABAPENTIN 300 MG: 300 CAPSULE ORAL at 08:14

## 2021-06-02 RX ADMIN — OXYCODONE HYDROCHLORIDE 5 MG: 5 TABLET ORAL at 11:36

## 2021-06-02 RX ADMIN — LEVOTHYROXINE SODIUM 25 MCG: 25 TABLET ORAL at 05:01

## 2021-06-02 RX ADMIN — Medication 1000 UNITS: at 08:14

## 2021-06-02 RX ADMIN — OXYCODONE HYDROCHLORIDE 5 MG: 5 TABLET ORAL at 22:10

## 2021-06-02 RX ADMIN — PREDNISONE 5 MG: 5 TABLET ORAL at 08:14

## 2021-06-02 RX ADMIN — HYDROXYCHLOROQUINE SULFATE 100 MG: 200 TABLET, FILM COATED ORAL at 17:18

## 2021-06-02 NOTE — PLAN OF CARE
Problem: OCCUPATIONAL THERAPY ADULT  Goal: Performs self-care activities at highest level of function for planned discharge setting  See evaluation for individualized goals  Description: Treatment Interventions: ADL retraining, Functional transfer training, UE strengthening/ROM, Endurance training, Cognitive reorientation, Patient/family training, Equipment evaluation/education, Compensatory technique education, Energy conservation, Activityengagement          See flowsheet documentation for full assessment, interventions and recommendations  Note: Limitation: Decreased ADL status, Decreased Safe judgement during ADL, Decreased cognition, Decreased endurance, Decreased UE ROM, Decreased self-care trans, Decreased high-level ADLs  Prognosis: Fair  Assessment: 68 YO Female SEEN FOR INITIAL OCCUPATIONAL THERAPY EVALUATION S/P L REVERSE TSA WITH LONG HEAD BICEPS TENODESIS ON 6/1/21  PT CURRENTLY HAS THE FOLLOWING RESTRICTIONS;LUE NWB STATUS IN SHOULDER ABDUCTION SLING  PROBLEMS LIST INCLUDES ROTATOR CUFF ARTHROPATHY, ANXIETY, DEPRESSION, DISEASE OF THYROID GLAND, GERD, HYPERTHYROIDISM, OP, POLIO, PVD, AND H/O MULTIPLE ORTHOPEDIC SX  PT IS FROM HOME WITH HER SISTER WHERE SHE REPORTS BEING INDEPENDENT WITH LT ADLS/IADLS STATING HER AND HER SISTER "HELP EACH OTHER"  PT CURRENTLY REQUIRES OVERALL MAX A WITH ADLS, AND MOD A X2 WITH TRANSFERS /LIMITED FUNCTIONAL MOBILITY WITH HHA  PT INITIALLY INSISTING ON USE RW WITH USE OF RUE ONLY REQUIRING EDUCATION ON SAFETY AND CARRY OVER OF LEARNED PRECAUTIONS  PT IS LIMITED 2' PAIN, FATIGUE, IMPAIRED BALANCE, FALL RISK , LIMITED SAFETY AWARENESS/INSIGHT/JUDGEMENT, WB RESTRICTIONS, ORTHOPEDIC RESTRICTIONS, OVERALL WEAKNESS/DECONDITIONING , LIMITED FAMILY/FRIEND SUPPORT , INACCESSIBLE HOME ENVIRONMENT and OVERALL LIMITED ACTIVITY TOLERANCE   PT EDUCATED ON TSA PRECAUTIONS, CARRY OVER OF WB STATUS, DEEP BREATHING TECHNIQUES T/O ACTIVITY, SLOWING OF PACE and CONTINUE PARTICIPATION IN SELF-CARE/MOBILITY WITH STAFF [address]   The patient's raw score on the AM-PAC Daily Activity inpatient short form is 14, standardized score is 33 39, less than 39 4  Patients at this level are likely to benefit from discharge to post-acute rehabilitation services  Please refer to the recommendation of the Occupational Therapist for safe discharge planning  FROM AN OCCUPATIONAL THERAPY PERSPECTIVE, PT WOULD BENEFIT FROM ADDITIONAL OT SERVICES IN AN INPT REHAB SETTING UPON D/C  WILL CONT TO FOLLOW TO ADDRESS THE BELOW DESCRIBED GOALS  OT Discharge Recommendation: Post acute rehabilitation services  OT - OK to Discharge:  Yes

## 2021-06-02 NOTE — PROGRESS NOTES
Orthopedics   Geetha Colon 67 y o  female MRN: [id number]  Unit/Bed#: -01      Subjective:  67 y  o female post operative day 1 left reverse total shoulder arthroplasty  Patient doing well  Pain controlled  No overnight events  No acute complaints       Labs:  0   Lab Value Date/Time    HCT 37 7 04/12/2021 0814    HCT 34 9 10/27/2020 1455    HCT 34 8 10/01/2020 0835    HCT 36 8 12/16/2014 0727    HCT 28 8 (L) 10/30/2014 0626    HCT 29 2 (L) 10/27/2014 0648    HGB 11 8 04/12/2021 0814    HGB 10 5 (L) 10/27/2020 1455    HGB 11 0 (L) 10/01/2020 0835    HGB 11 9 12/16/2014 0727    HGB 8 8 (L) 10/30/2014 0626    HGB 9 0 (L) 10/27/2014 0648    INR 0 96 05/11/2021 0935    INR 0 96 10/13/2014 0608    WBC 6 43 04/12/2021 0814    WBC 5 98 10/27/2020 1455    WBC 7 72 10/01/2020 0835    WBC 5 60 12/16/2014 0727    WBC 5 58 10/30/2014 0626    WBC 6 95 10/27/2014 0648    ESR 15 04/12/2021 0814    CRP <3 0 04/12/2021 0814       Meds:    Current Facility-Administered Medications:     acetaminophen (TYLENOL) tablet 650 mg, 650 mg, Oral, Q6H PRN, Eve More, PA-C, 650 mg at 06/02/21 0045    ascorbic acid (VITAMIN C) tablet 1,000 mg, 1,000 mg, Oral, Daily, Eve More, PA-C    calcium carbonate (TUMS) chewable tablet 1,000 mg, 1,000 mg, Oral, Daily PRN, Eve More, PA-C    cholecalciferol (VITAMIN D3) tablet 1,000 Units, 1,000 Units, Oral, Daily, Eve More, PA-C    docusate sodium (COLACE) capsule 100 mg, 100 mg, Oral, BID, Eve More, PA-C, 100 mg at 06/01/21 1754    famotidine (PEPCID) tablet 20 mg, 20 mg, Oral, Daily, Eve More, PA-C    folic acid (FOLVITE) tablet 1,000 mcg, 1,000 mcg, Oral, Daily, Eve Do PA-C    gabapentin (NEURONTIN) capsule 300 mg, 300 mg, Oral, TID, ZAIN Perez-SHAUN, 300 mg at 06/01/21 4871    HYDROmorphone (DILAUDID) injection 0 5 mg, 0 5 mg, Intravenous, Q2H PRN, Eve Do PA-C    hydroxychloroquine (PLAQUENIL) tablet 100 mg, 100 mg, Oral, QPM, Monika Rayo ANGELA Shetty    hydroxychloroquine (PLAQUENIL) tablet 200 mg, 200 mg, Oral, Daily With Breakfast, Nedra Correia PA-C    lactated ringers bolus 1,000 mL, 1,000 mL, Intravenous, Once PRN **AND** lactated ringers bolus 1,000 mL, 1,000 mL, Intravenous, Once PRN, Nedra Correia PA-C    lactated ringers infusion, 125 mL/hr, Intravenous, Continuous, Booker Burr MD, Stopped at 06/01/21 1305    lactated ringers infusion, 100 mL/hr, Intravenous, Continuous, Nedra Correia PA-C, Last Rate: 100 mL/hr at 06/01/21 1305, 100 mL/hr at 06/01/21 1305    levothyroxine tablet 25 mcg, 25 mcg, Oral, Early Morning, Nedra Correia PA-C, 25 mcg at 06/02/21 0501    melatonin tablet 3 mg, 3 mg, Oral, HS, Ansley Vasquez MD, 3 mg at 06/01/21 2343    [START ON 6/3/2021] methotrexate tablet 7 5 mg, 7 5 mg, Oral, Weekly, Nedra Correia PA-C    multivitamin-minerals (CENTRUM) tablet 1 tablet, 1 tablet, Oral, Daily, Nedra Correia PA-C    nortriptyline (PAMELOR) capsule 50 mg, 50 mg, Oral, HS, Nedra Correia PA-C    ondansetron [facility]) injection 4 mg, 4 mg, Intravenous, Q6H PRN, Nedra Correia PA-C    oxyCODONE (ROXICODONE) IR tablet 2 5 mg, 2 5 mg, Oral, Q4H PRN, Nedra Correia PA-C    oxyCODONE (ROXICODONE) IR tablet 5 mg, 5 mg, Oral, Q4H PRN, Nedra Correia PA-C, 5 mg at 06/02/21 0500    predniSONE tablet 5 mg, 5 mg, Oral, Daily, Nedra Correia PA-C    psyllium (METAMUCIL) 1 packet, 1 packet, Oral, TID, Nedra Correia PA-C    vitamin E (tocopherol) capsule 400 Units, 400 Units, Oral, Daily, Nedra Correia PA-C    Blood Culture:   Lab Results   Component Value Date    BLOODCX No Growth After 5 Days  12/02/2019    BLOODCX No Growth After 5 Days  12/02/2019       Wound Culture:   No results found for: WOUNDCULT    Ins and Outs:  I/O last 24 hours: In: 1520 [P O :120;  I V :1400]  Out: 1250 [Urine:1250]          Physical:  Vitals:    06/02/21 0304   BP: 106/53   Pulse: 80   Resp: 17   Temp: 98 6 °F (37 °C)   SpO2: 96% left upper extremity  · Dressings clean dry intact  · Sensation intact to axillary, musculocutaneous, radial, ulna, median nerves  · Motor intact to axillary, musculocutaneous, radial, ulna, median nerves  · 2+ Radial pulse    Assessment: 67 y  o female post operative day 1 left reverse total shoulder arthroplasty   Doing well    Plan:  · Nonweight Bearing left upper extremity  · Up and out of bed  · Sling for Comfort, may remove for pendulums and hygiene  · DVT prophylaxis  · Ice and analgesics  · Will continue to assess for acute blood loss anemia      Nnamdi Hathaway PA-C

## 2021-06-02 NOTE — OCCUPATIONAL THERAPY NOTE
Occupational Therapy Evaluation     Patient Name: Charmayne Mylar  Today's Date: 6/2/2021  Problem List  Principal Problem:    Rotator cuff arthropathy of left shoulder    Past Medical History  Past Medical History:   Diagnosis Date    Acute blood loss anemia 9/9/2020    Aftercare following joint replacement 9/9/2020    Patient had right reversed total shoulder arthroplasty   Pain was controlled when he left the hospital       Anxiety     Arthritis     Depression     Disease of thyroid gland     HYPO    Femur neck fracture (HCC)     GERD (gastroesophageal reflux disease)     Hyperthyroidism     RESOLVED: [id number]    Osteoporosis     Polio     PVD (peripheral vascular disease) ([address] )     Right BBB/left ant fasc block     UTI (urinary tract infection)     Varicella infection     LAST ASSESSED: [id number]     Past Surgical History  Past Surgical History:   Procedure Laterality Date    APPENDECTOMY      HIP ARTHROPLASTY Left 11/27/2017    Procedure: REMOVAL IM NAIL CONVERSION TO TOTAL HIP ARTHROPLASTY POSTERIOR APPROACH;  Surgeon: Kelly Gottlieb MD;  [facility];  Service: Orthopedics    HIP FRACTURE SURGERY      HIP SURGERY      both hips replaced;2013 left ,2014 right    JOINT REPLACEMENT Right     HIP TOTAL    MS CONV PREV HIP SURG TO TOT HIP ARTHROPLAS Left 10/4/2017    Procedure: Simone Runner removal of left hip;  Surgeon: Kelly Gottlieb MD;  [facility];  Service: Orthopedics    MS RECONSTR TOTAL SHOULDER IMPLANT Right 9/2/2020    Procedure: ARTHROPLASTY SHOULDER REVERSE;  Surgeon: Yohana Rodriguez MD;  [facility];  Service: Orthopedics    REVISION TOTAL HIP ARTHROPLASTY Right     TONSILLECTOMY             06/02/21 0825   OT Last Visit   OT Visit Date 06/02/21   Note Type   Note type Evaluation   Restrictions/Precautions   Weight Bearing Precautions Per Order Yes   LUE Weight Bearing Per Order NWB  (IN ABDUCTION SLING )   Braces or Orthoses Sling  (ABDUCTION SLING ) Other Precautions WBS; Cognitive; Fall Risk;Pain   Pain Assessment   Pain Assessment Tool 0-10   Pain Score 8   Pain Location/Orientation Orientation: Left; Location: Arm   Patient's Stated Pain Goal No pain   Hospital Pain Intervention(s) Repositioned; Ambulation/increased activity; Emotional support   Home Living   Type of [address] [place] [address] Two level;Stairs to enter with rails;1/2 bath on main level  (3 VILMA )   Bathroom Shower/Tub Tub/shower unit   Bathroom Toilet Standard   Bathroom Equipment Shower chair;Commode   Bathroom Accessibility Accessible   Home Equipment Walker   Additional Comments + USE OF RW AND SC    Prior Function   Level of [place] Independent with ADLs and functional mobility   Lives With [employer] Help From Family   ADL Assistance Needs assistance   IADLs Needs assistance   Falls in the last 6 months 0   Vocational Retired   Lifestyle   Autonomy PT [address] ADLS/IADLS [address] "HELP EACH OTHER" INCLUDING BATHING  -    Reciprocal Relationships LIVES WITH SISTER, REPORTING THEY HELP EACH OTHER   SOMMER Chau    Service to Others RETIRED   Intrinsic Gratification ENJOYS WATCHING TV    Subjective   Subjective "I JUST WANT TO GO HOME" "I NEED TO USE THE WALKER"    ADL   Eating Assistance 4  Minimal Assistance   Grooming Assistance 4  Minimal Assistance   UB Bathing Assistance 3  Moderate Assistance   LB Bathing Assistance 2  Maximal Assistance   UB Dressing Assistance 2  Maximal Assistance   LB Dressing Assistance 2  Maximal [address]  2  Maximal Assistance   Functional Assistance 2  Maximal Assistance   Bed Mobility   Supine to Sit 3  Moderate assistance   Additional items Increased time required;Verbal cues;LE management;Assist x 1   Sit to Supine Unable to assess  (PT LEFT OOB WITH ALL NEEDS IN REACH )   Transfers   Sit to Stand 3  Moderate assistance   Additional items Assist x 2; Increased time required;Verbal cues   Stand to Sit 3  Moderate assistance   Additional items Assist x 2; Increased time required;Verbal cues   Functional Mobility   Functional Mobility 3  Moderate assistance   Additional Comments AX2 FOR LIMITED FUNCTIONAL MOBILITY FROM BED->CHAIR WITH HHA    Balance   Static Sitting Fair   Static Standing Poor +   Ambulatory Poor -   Activity Tolerance   Activity Tolerance Patient limited by fatigue;Patient limited by pain   Medical Staff Made Aware PT SEEN FOR CO-SESSION WITH SKILLED PHYSICAL THERAPIST 2' CLINICALLY UNSTABLE PRESENTATION, NEW PRECAUTIONS/LIMITATIONS, LIMITED ACTIVITY TOLERANCE AND PRESENT IMPAIRMENTS WHICH ARE A REGRESSION FROM THE PT'S BASELINE AND IMPACTING OVERALL OCCUPATIONAL PERFORMANCE  Nurse Made Aware APPROPRIATE TO SEE    RUE Assessment   RUE Assessment WFL   LUE Assessment   LUE Assessment X  (nwb in sling )   Cognition   Overall Cognitive Status Impaired   Arousal/Participation Responsive; Cooperative   Attention Attends with cues to redirect   Orientation Level Oriented X4   Memory Decreased recall of precautions;Decreased recall of recent events;Decreased short term memory   Following Commands Follows one step commands without difficulty   Comments PT IS PLEASANT AND COOPERATIVE  LIMITED INSIGHT/JUDGEMENT  LIMITED RECALL/CARRY OVER OF LEARNED PRECAUTIONS    Assessment   Limitation Decreased ADL status; Decreased Safe judgement during ADL;Decreased cognition;Decreased endurance;Decreased UE ROM; Decreased self-care trans;Decreased high-level ADLs   Prognosis Fair   Assessment 66 YO Female SEEN FOR INITIAL OCCUPATIONAL THERAPY EVALUATION S/P L REVERSE TSA WITH LONG HEAD BICEPS TENODESIS ON 6/1/21  PT CURRENTLY HAS THE FOLLOWING RESTRICTIONS;LUE NWB STATUS IN SHOULDER ABDUCTION SLING   PROBLEMS LIST INCLUDES ROTATOR CUFF ARTHROPATHY, ANXIETY, DEPRESSION, DISEASE OF THYROID GLAND, GERD, HYPERTHYROIDISM, OP, POLIO, PVD, AND H/O MULTIPLE ORTHOPEDIC SX  PT IS FROM HOME WITH HER SISTER WHERE SHE REPORTS BEING INDEPENDENT WITH LT ADLS/IADLS STATING HER AND HER SISTER "HELP EACH OTHER"  PT CURRENTLY REQUIRES OVERALL MAX A WITH ADLS, AND MOD A X2 WITH TRANSFERS /LIMITED FUNCTIONAL MOBILITY WITH HHA  PT INITIALLY INSISTING ON USE RW WITH USE OF RUE ONLY REQUIRING EDUCATION ON SAFETY AND CARRY OVER OF LEARNED PRECAUTIONS  PT IS LIMITED 2' PAIN, FATIGUE, IMPAIRED BALANCE, FALL RISK , LIMITED SAFETY AWARENESS/INSIGHT/JUDGEMENT, WB RESTRICTIONS, ORTHOPEDIC RESTRICTIONS, OVERALL WEAKNESS/DECONDITIONING , LIMITED FAMILY/FRIEND SUPPORT , INACCESSIBLE HOME ENVIRONMENT and OVERALL LIMITED ACTIVITY TOLERANCE  PT EDUCATED ON TSA PRECAUTIONS, CARRY OVER OF WB STATUS, DEEP BREATHING TECHNIQUES T/O ACTIVITY, SLOWING OF PACE and CONTINUE PARTICIPATION IN SELF-CARE/MOBILITY WITH STAFF WHILE IN THE HOSPITAL   The patient's raw score on the AM-PAC Daily Activity inpatient short form is 14, standardized score is 33 39, less than 39 4  Patients at this level are likely to benefit from discharge to post-acute rehabilitation services  Please refer to the recommendation of the Occupational Therapist for safe discharge planning  FROM AN OCCUPATIONAL THERAPY PERSPECTIVE, PT WOULD BENEFIT FROM ADDITIONAL OT SERVICES IN AN INPT REHAB SETTING UPON D/C  WILL CONT TO FOLLOW TO ADDRESS THE BELOW DESCRIBED GOALS  Goals   Patient Goals TO RETURN HOME    LTG Time Frame 10-14   Long Term Goal #1 SEE BELOW    Plan   Treatment Interventions ADL retraining;Functional transfer training;UE strengthening/ROM; Endurance training;Cognitive reorientation;Patient/family training;Equipment evaluation/education; Compensatory technique education; Energy conservation; Activityengagement   Goal Expiration Date 06/16/21   OT Frequency 3-5x/wk   Recommendation   OT Discharge Recommendation Post acute rehabilitation services   OT - OK to Discharge Yes   AM-PAC Daily Activity Inpatient Lower Body Dressing 2   Bathing 2   Toileting 2   Upper Body Dressing 2   Grooming 3   Eating 3   Daily Activity Raw Score 14   Daily Activity Standardized Score (Calc for Raw Score >=11) 33 39   AM-PAC Applied Cognition Inpatient   Following a Speech/Presentation 3   Understanding Ordinary Conversation 4   Taking Medications 3   Remembering Where Things Are Placed or Put Away 3   Remembering List of 4-5 Errands 3   Taking Care of Complicated Tasks 3   Applied Cognition Raw Score 19   Applied Cognition Standardized Score 39 77   Modified Cari Scale   Modified Fajardo Scale 4       OCCUPATIONAL THERAPY GOALS TO BE MET WITHIN 14 DAYS:    -Pt will increase bed mobility to S LEVEL to participate in functional activities   -Pt will improve functional mobility and transfers to S LEVEL on/off all surfaces including toileting w/ G carry over of WBS  -Pt will increase independence in all ADLS to S LEVEL with G carry over of learned precautions/WBS  -Pt will improve activity tolerance to G for 20min txment sessions w/ G carry over of learned energy conservation techniques   -Pt will demonstrate G carryover of learned WBS, safety techniques and proper body mechanics in functional and leisure activities with use of DME   -Pt will complete additional cognitive assessment with 100% attention to task in order to assist with safe d/c plan         Documentation completed by FELICITAS Cheney, OTR/L

## 2021-06-02 NOTE — PHYSICIAN ADVISOR
Current patient class: Outpatient Surgery  The patient is currently on Hospital Day: 2 at [address] [place] [address]      This patient was originally admitted to the hospital under outpatient surgical class  After admission the patient was reevaluated and determined to require further hospitalization  The patient was then documented to require at least a 2nd midnight in the hospital  As such the patient was then expected to satisfy the 2 midnight benchmark and was therefore eligible for inpatient admission  After review of the relevant documentation, labs, vital signs and test results, the patient is appropriate for INPATIENT ADMISSION  Admission to the hospital as an inpatient is a complex decision making process which requires the practitioner to consider the patients presenting complaint, history and physical examination and all relevant testing  With this in mind, in this case, the patient was deemed appropriate for INPATIENT ADMISSION  After review of the documentation and testing available at the time of the admission I concur with this clinical determination of medical necessity  Rationale is as follows: The patient is a 67 yrs Female who presented 6/1/21 for left reverse total shoulder arthroplasty  Pt with ho RA, SLE and chronic anemia  Patient had uneventful OR course and is followed by internal medicine for chronic conditions  Acute pain has been managing her pain  She has been  Evaluated by PT and STR recommended for safe discharge  Given her comorbidities, with need for STR for discharge, she is inpatient appropriate        The patients vitals on arrival were   ED Triage Vitals   Temperature Pulse Respirations Blood Pressure SpO2   06/01/21 0624 06/01/21 0624 06/01/21 0624 06/01/21 0624 06/01/21 0624   (!) 96 1 °F (35 6 °C) 65 16 139/73 100 %      Temp Source Heart Rate Source Patient Position - Orthostatic VS BP Location FiO2 (%)   06/01/21 0554 06/01/21 9823 06/02/21 1553 06/02/21 1553 --   Tymp Core Monitor Lying Right arm       Pain Score       06/01/21 0640       Worst Possible Pain           Past Medical History:   Diagnosis Date    Acute blood loss anemia 9/9/2020    Aftercare following joint replacement 9/9/2020    Patient had right reversed total shoulder arthroplasty   Pain was controlled when he left the hospital       Anxiety     Arthritis     Depression     Disease of thyroid gland     HYPO    Femur neck fracture (HCC)     GERD (gastroesophageal reflux disease)     Hyperthyroidism     RESOLVED: [id number]    Osteoporosis     Polio     PVD [facility]ipheral [facility] disease) [facility] )     Right BBB/left ant fasc block     UTI (urinary tract infection)     Varicella infection     LAST ASSESSED: [id number]     Past Surgical History:   Procedure Laterality Date    APPENDECTOMY      HIP ARTHROPLASTY Left 11/27/2017    Procedure: REMOVAL IM NAIL CONVERSION TO TOTAL HIP ARTHROPLASTY POSTERIOR APPROACH;  Surgeon: Sandra Banuelos MD;  [facility];  Service: Orthopedics    HIP FRACTURE SURGERY      HIP SURGERY      both hips replaced;2013 left ,2014 right    JOINT REPLACEMENT Right     HIP TOTAL    AK CONV PREV HIP SURG TO TOT HIP ARTHROPLAS Left 10/4/2017    Procedure: Olney Champ removal of left hip;  Surgeon: Sandra Banuelos MD;  [facility];  Service: Orthopedics    AK RECONSTR TOTAL SHOULDER IMPLANT Right 9/2/2020    Procedure: ARTHROPLASTY SHOULDER REVERSE;  Surgeon: Harlene Kanner, MD;  [facility];  Service: Orthopedics    REVISION TOTAL HIP ARTHROPLASTY Right     TONSILLECTOMY         The patient was admitted to the hospital at N/A on N/A for the following diagnosis:  Rotator cuff tear arthropathy of left shoulder [M75 102, M12 812]  Acquired subluxation of left shoulder, initial encounter [S43 002A]    Consults have been placed to:   IP CONSULT TO ACUTE PAIN SERVICE  IP CONSULT TO INTERNAL MEDICINE  IP CONSULT TO CASE MANAGEMENT    Vitals:    06/02/21 0304 06/02/21 0741 06/02/21 1124 06/02/21 1553   BP: 106/53 106/54 141/79 120/71   BP Location:    Right arm   Pulse: 80 88 90 84   Resp: 17 18 18 19   Temp: 98 6 °F (37 °C) 99 9 °F (37 7 °C) (!) 101 1 °F (38 4 °C) 100 1 °F (37 8 °C)   TempSrc:    Oral   SpO2: 96% 97% 97% 96%   Weight:       Height:           Most recent labs:    Recent Labs     06/02/21  0603 06/02/21  0742   WBC  --  9 30   HGB  --  10 4*   HCT  --  34 2*   PLT  --  289   K 3 5  --    CALCIUM 6 9*  --    BUN 5  --    CREATININE 0 81  --        Scheduled Meds:  Current Facility-Administered Medications   Medication Dose Route Frequency Provider Last Rate    acetaminophen  650 mg Oral Q6H PRN ZAIN Khan-C      vitamin C  1,000 mg Oral Daily ZAIN Khan-C      calcium carbonate  1,000 mg Oral Daily PRN ZAIN Khan-C      cholecalciferol  1,000 Units Oral Daily Nedra Correia, PA-C      docusate sodium  100 mg Oral BID ZAIN Khan-C      famotidine  20 mg Oral Daily ZAIN Khan-SHAUN      folic acid  4,559 mcg Oral Daily Nedra Correia, PA-C      gabapentin  300 mg Oral TID ZAIN Khan-C      HYDROmorphone  0 5 mg Intravenous Q2H PRN ZAIN Khan-C      hydroxychloroquine  100 mg Oral QPM ZAIN Khan-C      hydroxychloroquine  200 mg Oral Daily With Breakfast Nedra Correia PA-C      iron sucrose  200 mg Intravenous Daily CONNIE Taylor      lactated ringers  125 mL/hr Intravenous Continuous Booker Burr MD Stopped (06/01/21 1305)    levothyroxine  25 mcg Oral Early Morning Nedra Correia PA-C      melatonin  3 mg Oral HS Ansley Vasquez MD      [START ON 6/3/2021] methotrexate  7 5 mg Oral Weekly Nedra Correia PA-C      multivitamin-minerals  1 tablet Oral Daily ZAIN Khan-SHAUN      nortriptyline  50 mg Oral HS Nedra Correia PA-C      ondansetron  4 mg Intravenous Q6H PRN Nedra Correia PA-C      oxyCODONE  2 5 mg Oral Q4H PRN Savi Hall PA-C      oxyCODONE  5 mg Oral Q4H PRN Savi Hall PA-C      predniSONE  5 mg Oral Daily Savi Hall PA-C      psyllium  1 packet Oral TID Savi Hall PA-C      vitamin E (tocopherol)  400 Units Oral Daily Savi Hall PA-C       Continuous Infusions:lactated ringers, 125 mL/hr, Last Rate: Stopped (06/01/21 1305)      PRN Meds:   acetaminophen    calcium carbonate    HYDROmorphone    ondansetron    oxyCODONE    oxyCODONE    Surgical procedures (if appropriate):  Procedure(s):  ARTHROPLASTY SHOULDER REVERSE

## 2021-06-02 NOTE — CASE MANAGEMENT
Pt is not a <30 day readmission or current bundle  Risk of unplanned readmission score is unavailable at this time  Pt s/p left reverse total shoulder replacement  CM met with pt at bedside to introduce CM role and begin discharge planning  Pt lives with her sister, Teodora Henson ([phone]) in a 2 story house that has 3 VILMA with railing and a first floor setup  Pt reports being independent with ADLs PTA, pt uses a rolling walker and has access to a quad cane, shower chair, and bedside commode  Pt reports being current with [facility], history of STR at [facility]  No indicated history of inpatient MH treatment or D/A treatment  Pt drives and is retired  PCP is ANGELA Ambriz ([phone]) and pt uses [email or web address] in [place] for prescriptions  CM reviewed STR recommendation with pt who reports that she would prefer [facility] as her granddaughter works there and pt has been there in the past reporting a positive experience  Referral to [facility] placed via [facility] [address] department to follow  CM reviewed d/c planning process including the following: identifying help at home, patient preference for d/c planning needs, Discharge Lounge, [facility] Meds to Bed program, availability of treatment team to discuss questions or concerns patient and/or family may have regarding understanding medications and recognizing signs and symptoms once discharged  CM also encouraged patient to follow up with all recommended appointments after discharge  Patient advised of importance for patient and family to participate in managing patients medical well being

## 2021-06-02 NOTE — PHYSICAL THERAPY NOTE
PHYSICAL THERAPY EVALUATION  NAME:  Malorie Rivera  DATE: 06/02/21    AGE:   67 y o  Mrn:   [id number]  ADMIT DX:  Rotator cuff tear arthropathy of left shoulder [M75 102, M12 812]  Acquired subluxation of left shoulder, initial encounter [M72 501V]    Past Medical History:   Diagnosis Date    Acute blood loss anemia 9/9/2020    Aftercare following joint replacement 9/9/2020    Patient had right reversed total shoulder arthroplasty   Pain was controlled when he left the hospital       Anxiety     Arthritis     Depression     Disease of thyroid gland     HYPO    Femur neck fracture (HCC)     GERD (gastroesophageal reflux disease)     Hyperthyroidism     RESOLVED: [id number]    Osteoporosis     Polio     PVD [facility]peripheral vascular disease) ([facility] [address] )     Right BBB/left ant fasc block     UTI (urinary tract infection)     Varicella infection     LAST ASSESSED: [id number]       Past Surgical History:   Procedure Laterality Date    APPENDECTOMY      HIP ARTHROPLASTY Left 11/27/2017    Procedure: REMOVAL IM NAIL CONVERSION TO TOTAL HIP ARTHROPLASTY POSTERIOR APPROACH;  Surgeon: Kate Cannon MD;  [facility];  Service: Orthopedics    HIP FRACTURE SURGERY      HIP SURGERY      both hips replaced;2013 left ,2014 right    JOINT REPLACEMENT Right     HIP TOTAL    MD CONV PREV HIP SURG TO TOT HIP ARTHROPLAS Left 10/4/2017    Procedure: Hareware removal of left hip;  Surgeon: Kate Cannon MD;  [facility];  Service: Orthopedics    MD RECONSTR TOTAL SHOULDER IMPLANT Right 9/2/2020    Procedure: ARTHROPLASTY SHOULDER REVERSE;  Surgeon: Shivam Tee MD;  [facility];  Service: Orthopedics    REVISION TOTAL HIP ARTHROPLASTY Right     TONSILLECTOMY         Length Of Stay: 0    PHYSICAL THERAPY EVALUATION:     Actual Pt care time [id number]   06/02/21 1100   Note Type   Note type Evaluation   Pain Assessment   Pain Assessment Tool 0-10   Pain Score 8   Pain Location/Orientation Orientation: Left; Location: Arm   Pain Onset/Description Onset: Ongoing;Frequency: Constant/Continuous; Descriptor: Aching   Effect of Pain on Daily Activities increased pain with activity    Patient's Stated Pain Goal No pain   Hospital Pain Intervention(s) Ambulation/increased activity;Repositioned   Home Living   Type of [address] [place] [address] Two level;Stairs to enter with rails;Bed/bath upstairs  (2 VILMA )   Home Equipment Walker   Additional Comments Pt reports living with sisster  Pt states " my sister and I help each other"  Pt also reports having a local neice who can provide some assist if needed    Prior Function   Level of [place] Independent with ADLs and functional mobility   Lives With Family   Receives Help From Family   ADL Assistance Needs assistance   Falls in the last 6 months 0   Comments Pt reports the use of a RW for ambulation PTA    Restrictions/Precautions   Weight Bearing Precautions Per Order Yes   LUE Weight Bearing Per Order NWB  (in shoulder abduction sling )   Braces or Orthoses Sling  (shoulder abduction sling )   Other Precautions Cognitive; Chair Alarm; Bed Alarm;WBS;Multiple lines; Fall Risk;Pain   General   Family/Caregiver Present No   Cognition   Overall Cognitive Status Impaired   Arousal/Participation Alert   Orientation Level Oriented to person;Oriented to place;Oriented to time   Memory Decreased recall of precautions   Following Commands Follows one step commands with increased time or repetition   RUE Assessment   RUE Assessment WFL   LUE Assessment   LUE Assessment X   RLE Assessment   RLE Assessment WFL   Strength RLE   RLE Overall Strength 3+/5   LLE Assessment   LLE Assessment WFL   Strength LLE   LLE Overall Strength 3+/5   Bed Mobility   Supine to Sit 3  Moderate assistance   Additional items Assist x 1; Increased time required;Verbal cues   Transfers   Sit to Stand 3  Moderate assistance   Additional items Assist x 2; Increased time required;Verbal cues   Stand to Sit 3  Moderate assistance   Additional items Assist x 2; Increased time required;Verbal cues   Additional Comments VC and TC needed for hand placement and safety, HHA utilized for transfers    Ambulation/Elevation   Gait pattern Excessively slow; Short stride; Foward flexed; Inconsistent ora;Decreased foot clearance;Narrow JEOVANY   Gait Assistance 3  Moderate assist   Additional items Assist x 2   Assistive Device Other (Comment)  (HHA )   Distance 4ft   (limited by fatigue )   Balance   Static Sitting Fair   Static Standing Poor +   Ambulatory Poor -   Endurance Deficit   Endurance Deficit Yes   Endurance Deficit Description fatigue, pain   Activity Tolerance   Activity Tolerance Patient limited by fatigue;Patient limited by pain   Medical Staff Made Aware Chise, OT; OT present for co evaluation due to pts unstable presentation, new physical limiations/ precautions, hx of Ax2 from previous PT notes, and decreased activity tolerance which limits pts overall physical performance    Nurse Made Aware Pt appropriate to be seen and mobilize per nsg    Assessment   Prognosis Good   Problem List Decreased strength;Decreased range of motion;Decreased endurance; Impaired balance;Decreased mobility; Decreased cognition; Impaired judgement;Decreased safety awareness;Pain;Orthopedic restrictions   Assessment Pt is 67 y o  female seen for PT evaluation s/p admit to [facility] on 6/1/2021  Two pt identifiers were used to confirm  Pt presented for scheduled Left Reverse Total Shoulder Arthroplasty with Long Head Biceps Tenodesis which was performed on 6/1/21  Pt was admitted with a primary dx of: rotator cuff arthropathy of L shoulder s/p Left Reverse Total Shoulder Arthroplasty with Long Head Biceps Tenodesis  PT now consulted for assessment of mobility and d/c needs  Pt with Out of bed orders    Pts current co morbidities affecting treatment include: anxiety, depression , disease of thyroid gland, GERD, hyperthyroidism, osteoporosis, polio, PVD, right BBB, and personal factors including VILMA home   Pts current clinical presentation is Unstable/ Unpredictable (high complexity) due to Ongoing medical management for primary dx, Decreased activity tolerance compared to baseline, Fall risk, Increased assistance needed from caregiver at current time, Cog status, Current WBS, Continuous pulse oximetry monitoring , s/p surgical intervention    Upon evaluation, pt currently is requiring Mod Ax1 for bed mobility; Mod Ax2 for transfers and Mod Ax2 for ambulation w/ HHA    Pt denies any lightheadedness or dizziness with ambulation  Pt presents at PT eval functioning below baseline and currently w/ overall mobility deficits 2* to: BLE weakness, decreased ROM, impaired balance, decreased endurance, gait deviations, pain, decreased activity tolerance compared to baseline, decreased safety awareness, impaired judgement, fall risk, orthopedic restrictions, decreased cognition  Pt currently at a fall risk 2* to impairments listed above  Based on the aforementioned PT evaluation, pt will continue to benefit from skilled Acute PT interventions to address stated impairments; to maximize functional mobility; for ongoing pt/ family training; and DME needs  At conclusion of PT session pt returned back in chair and chair alarm engaged with phone and call bell within reach  Pt denies any further questions at this time  PT is currently recommending Rehab  PT will continue to follow during hospital stay  Barriers to Discharge Inaccessible home environment;Decreased caregiver support   Barriers to Discharge Comments steps to manage at home, limited social support    Goals   Patient Goals " to get better"   STG Expiration Date 06/12/21   Short Term Goal #1 In 10 days pt will complete: 1) Bed mobility skills with S while maintaining NWB to LUE to increase safety and independence as well as decrease caregiver burden   2) Functional transfers with S while maintaining NWB to LUE to promote increased independence, safety, and QOL  3) Ambulate 150' using least restrictive AD with S while maintaining NWB to LUE without LOB and stable vitals so that pt can negotiate previous living environment safely and promote independence with functional mobility and return to PLOF  4) Improve balance grades by 1/2 grade to increase safety with all mobility and decrease fall risk  5) Improve BLE strength by 1/2 grade to help increase overall functional mobility and decrease fall risk  Plan   Treatment/Interventions Functional transfer training;LE strengthening/ROM; Therapeutic exercise; Endurance training;Equipment eval/education;Patient/family training;Bed mobility;Gait training;Spoke to nursing;OT;Spoke to MD   PT Frequency Other (Comment)  (3-6x a week )   Recommendation   PT Discharge Recommendation Post acute rehabilitation services   Equipment Recommended   (continue to assess )   PT - OK to Discharge Yes  (to rehab when medically cleared)   AM-PAC Basic Mobility Inpatient   Turning in Bed Without Bedrails 2   Lying on Back to Sitting on Edge of Flat Bed 2   Moving Bed to Chair 1   Standing Up From Chair 1   Walk in Room 1   Climb 3-5 Stairs 1   Basic Mobility Inpatient Raw Score 8   Turning Head Towards Sound 4   Follow Simple Instructions 3   Low Function Basic Mobility Raw Score 15   Low Function Basic Mobility Standardized Score 23 9   Modified [place] Scale   Modified [place] Scale 4   Barthel Index   Feeding 5   Bathing 0   Grooming Score 0   Dressing Score 0   Bladder Score 10   Bowels Score 10   Toilet Use Score 5   Transfers (Bed/Chair) Score 5   Mobility (Level Surface) Score 0   Stairs Score 0   Barthel Index Score 35   Portions of the documentation may have been created using voice recognition software  Occasional wrong word or sound alike substitutions may have occurred due to the inherent limitations of the voice recognition software   Read the chart carefully and recognize, using context, where substitutions have occurred      Yousif , PT, DPT

## 2021-06-02 NOTE — CONSULTS
Peripheral Nerve Block Follow-up Note - Acute Pain Service    Geetha Rivera 67 y o  female MRN: [id number]  Unit/Bed#: -01 Encounter: [id number]      Assessment:   Principal Problem:    Rotator cuff arthropathy of left shoulder    Chandra Chahal is a 67y o  year old female s/p L TSA with IS block with exparel in place  Pt reporting high pain scores however on exam, reports numbness to LT in the block distribution  Discussed with pt that she should continue to alternate tylenol and oxycodone as needed  She is awaiting placement for d/c      Plan:   - Left interscalene block is functioning appropriately with expected sensory deficits  - Recommend geriatric oral opioid regimen with oxycodone 2 5 mg/5 mg PO q4hrs PRN for moderate/severe pain, Dilaudid 0 2 mg IV q2hrs PRN for breakthrough pain    - Multimodal analgesia with:  - Tylenol 650 mg PO q6hrs standing  - Gabapentin 300 mg PO TID    APS will sign off at this time  Thank you for the consult  All opioids and other analgesics to be written at discretion of primary team  Please call  / [phone] or [facility] (/ between [phone] and on weekends) with questions or concerns    Pain History  Current pain location(s): left shoulder  Pain Scale:   5/10  Quality: sharp  24 hour history: as above, no CP, SOB, n/v    Opioid requirement previous 24 hours: oxycodone 10mg    Meds/Allergies   all current active meds have been reviewed    No Known Allergies    Objective     Temp:  [97 4 °F (36 3 °C)-101 1 °F (38 4 °C)] 101 1 °F (38 4 °C)  HR:  [75-90] 90  Resp:  [16-18] 18  BP: (106-143)/(53-79) 141/79    Physical Exam  Vitals signs reviewed  Constitutional:       Appearance: Normal appearance  HENT:      Head: Normocephalic and atraumatic  Mouth/Throat:      Mouth: Mucous membranes are moist    Eyes:      Extraocular Movements: Extraocular movements intact  Cardiovascular:      Rate and Rhythm: Normal rate     Pulmonary: Effort: Pulmonary effort is normal  No respiratory distress  Musculoskeletal:      Comments: Left arm in sling, block site c/d/i, decreased sensation to LT along block distribution   Skin:     General: Skin is warm and dry  Neurological:      General: No focal deficit present  Mental Status: She is alert and oriented to person, place, and time  Psychiatric:         Mood and Affect: Mood normal          Behavior: Behavior normal            Lab Results:   Results from last 7 days   Lab Units 06/02/21  0742   WBC Thousand/uL 9 30   HEMOGLOBIN g/dL 10 4*   HEMATOCRIT % 34 2*   PLATELETS Thousands/uL 289      Results from last 7 days   Lab Units 06/02/21  0603   POTASSIUM mmol/L 3 5   CHLORIDE mmol/L 97*   CO2 mmol/L 22   BUN mg/dL 5   CREATININE mg/dL 0 81   CALCIUM mg/dL 6 9*       Imaging Studies: I have personally reviewed pertinent reports  EKG, Pathology, and Other Studies: I have personally reviewed pertinent reports  Counseling / Coordination of Care  Total floor / unit time spent today 15 minutes  Greater than 50% of total time was spent with the patient and / or family counseling and / or coordination of care  A description of the counseling / coordination of care: chart review, pt exam, discussion about block expectations and oral pain regimen    Please note that the APS provides consultative services regarding pain management only  With the exception of ketamine, peripheral nerve catheters, and epidural infusions (and except when indicated), final decisions regarding starting or changing doses of analgesic medications are at the discretion of the consulting service  Off hours consultation and/or medication management is generally not available      Melissa Valadez MD  Acute Pain Service

## 2021-06-02 NOTE — PLAN OF CARE
Problem: PHYSICAL THERAPY ADULT  Goal: Performs mobility at highest level of function for planned discharge setting  See evaluation for individualized goals  Description: Treatment/Interventions: Functional transfer training, LE strengthening/ROM, Therapeutic exercise, Endurance training, Equipment eval/education, Patient/family training, Bed mobility, Gait training, Spoke to nursing, OT, Spoke to MD  Equipment Recommended: (continue to assess )       See flowsheet documentation for full assessment, interventions and recommendations  Note: Prognosis: Good  Problem List: Decreased strength, Decreased range of motion, Decreased endurance, Impaired balance, Decreased mobility, Decreased cognition, Impaired judgement, Decreased safety awareness, Pain, Orthopedic restrictions  Assessment: Pt is 67 y o  female seen for PT evaluation s/p admit to [facility] on 6/1/2021  Two pt identifiers were used to confirm  Pt presented for scheduled Left Reverse Total Shoulder Arthroplasty with Long Head Biceps Tenodesis which was performed on 6/1/21  Pt was admitted with a primary dx of: rotator cuff arthropathy of L shoulder s/p Left Reverse Total Shoulder Arthroplasty with Long Head Biceps Tenodesis  PT now consulted for assessment of mobility and d/c needs  Pt with Out of bed orders  Pts current co morbidities affecting treatment include: anxiety, depression , disease of thyroid gland, GERD, hyperthyroidism, osteoporosis, polio, PVD, right BBB, and personal factors including VILMA home   Pts current clinical presentation is Unstable/ Unpredictable (high complexity) due to Ongoing medical management for primary dx, Decreased activity tolerance compared to baseline, Fall risk, Increased assistance needed from caregiver at current time, Cog status, Current WBS, Continuous pulse oximetry monitoring , s/p surgical intervention    Upon evaluation, pt currently is requiring Mod Ax1 for bed mobility;  Mod Ax2 for transfers and Mod Ax2 for ambulation w/ HHA    Pt denies any lightheadedness or dizziness with ambulation  Pt presents at PT eval functioning below baseline and currently w/ overall mobility deficits 2* to: BLE weakness, decreased ROM, impaired balance, decreased endurance, gait deviations, pain, decreased activity tolerance compared to baseline, decreased safety awareness, impaired judgement, fall risk, orthopedic restrictions, decreased cognition  Pt currently at a fall risk 2* to impairments listed above  Based on the aforementioned PT evaluation, pt will continue to benefit from skilled Acute PT interventions to address stated impairments; to maximize functional mobility; for ongoing pt/ family training; and DME needs  At conclusion of PT session pt returned back in chair and chair alarm engaged with phone and call bell within reach  Pt denies any further questions at this time  PT is currently recommending Rehab  PT will continue to follow during hospital stay  Barriers to Discharge: Inaccessible home environment, Decreased caregiver support  Barriers to Discharge Comments: steps to manage at home, limited social support      PT Discharge Recommendation: Post acute rehabilitation services     PT - OK to Discharge: Yes(to rehab when medically cleared)    See flowsheet documentation for full assessment

## 2021-06-02 NOTE — PROGRESS NOTES
Internal Medicine Progress Note  Patient: Brandee Rivera  Age/sex: 67 y o  female  Medical Record #: [id number]      ASSESSMENT/PLAN: (Interval History)  Richard Sotomayor is seen and examined and management for following issues:    Status Post left reverse Total SHOULDER ARTHROPLASTY   Pain controlled   Continue encourage incentive spirometry; monitor fever curve   DVT prophylaxis in place and reviewed  Results from last 7 days   Lab Units 06/02/21  0742   WBC Thousand/uL 9 30   HEMOGLOBIN g/dL 10 4*   HEMATOCRIT % 34 2*   PLATELETS Thousands/uL 289          Operative acute blood loss anemia  · Decrease in hgb levels from preop labs results; suspect due to post operation extravasation losses along with potential dilutional effects of fluids  · Monitor follow up CBC post intervention to trend effect    Rheumatoid arthritis  · Takes plaquenil 200mg in a m  100mg pm; prednisone 5mg daily and methotrexate 7 5mg on thursdays  · Will cont same as above     Lupus  · Same as above     Hypothyroid  · Continue levothyroxine 25mcg daily    Mild hyponatremia  · Likely secondary to poor appetite     GERD  · Cont PPI  · Monitor for nausea/vomiting     Chronic anemia  · Start venofer x 2 doses        PRE-OP HGB LEVEL: 11 8  The above assessment and plan was reviewed and updated as determined by my evaluation of the patient on 6/2/2021      Labs:   Results from last 7 days   Lab Units 06/02/21  0742   WBC Thousand/uL 9 30   HEMOGLOBIN g/dL 10 4*   HEMATOCRIT % 34 2*   PLATELETS Thousands/uL 289     Results from last 7 days   Lab Units 06/02/21  0603   SODIUM mmol/L 131*   POTASSIUM mmol/L 3 5   CHLORIDE mmol/L 97*   CO2 mmol/L 22   BUN mg/dL 5   CREATININE mg/dL 0 81   CALCIUM mg/dL 6 9*             Results from last 7 days   Lab Units 06/01/21  1225   POC GLUCOSE mg/dl 120       Review of Scheduled Meds:  Current Facility-Administered Medications   Medication Dose Route Frequency Provider Last Rate    acetaminophen  650 mg Oral Q6H PRN Neo Shines, PA-C      vitamin C  1,000 mg Oral Daily Neo Shines, PA-C      calcium carbonate  1,000 mg Oral Daily PRN Neo Shines, PA-C      cholecalciferol  1,000 Units Oral Daily Neo Shines, PA-C      docusate sodium  100 mg Oral BID Neo Shines, PA-C      famotidine  20 mg Oral Daily Neo Shines, PA-C      folic acid  3,838 mcg Oral Daily Neo Shines, PA-C      gabapentin  300 mg Oral TID Neo Shines, PA-C      HYDROmorphone  0 5 mg Intravenous Q2H PRN Neo Shines, PA-C      hydroxychloroquine  100 mg Oral QPM Neo Shines, PA-C      hydroxychloroquine  200 mg Oral Daily With Breakfast Neo Shines, PA-C      lactated ringers  1,000 mL Intravenous Once PRN Neo Shines, PA-C      And    lactated ringers  1,000 mL Intravenous Once PRN Neo Shines, PA-C      lactated ringers  125 mL/hr Intravenous Continuous Ryanne Baires MD Stopped (06/01/21 1305)    lactated ringers  100 mL/hr Intravenous Continuous Neo Shines, PA-SHAUN Stopped (06/02/21 0711)    levothyroxine  25 mcg Oral Early Morning Neo Shines, PA-C      melatonin  3 mg Oral HS Khai Hwang MD      [START ON 6/3/2021] methotrexate  7 5 mg Oral Weekly Neo Shines, PA-C      multivitamin-minerals  1 tablet Oral Daily Neo Shines, PA-C      nortriptyline  50 mg Oral HS Neo Shines, PA-C      ondansetron  4 mg Intravenous Q6H PRN Neo Shines, PA-C      oxyCODONE  2 5 mg Oral Q4H PRN Neo Shines, PA-C      oxyCODONE  5 mg Oral Q4H PRN Neo Shines, PA-C      predniSONE  5 mg Oral Daily Neo Shines, PA-C      psyllium  1 packet Oral TID Neo Shines, PA-C      vitamin E (tocopherol)  400 Units Oral Daily Neo Shines, PA-C         Subjective/ HPI: Brandee Rivera seen and examined  Patient's overnight issues or events were reviewed with nursing or staff during rounds or morning huddle session   New or overnight issues include the following:     Pt without any overnight events or reported nursing issues      ROS:   A 10 point ROS was performed; negative except as noted above  Imaging:     XR shoulder left 1 view    (Results Pending)       *Labs /Radiology studies Reviewed  *Medications  reviewed and reconciled as needed  *Please refer to order section for additional ordered labs studies  *Case discussed with primary attending during morning huddle case rounds    Physical Examination:  Vitals:   Vitals:    06/01/21 1850 06/01/21 2312 06/02/21 0304 06/02/21 0741   BP: 119/64 138/78 106/53 106/54   Pulse: 77 79 80 88   Resp: 17 17 17 18   Temp: 98 1 °F (36 7 °C) 98 3 °F (36 8 °C) 98 6 °F (37 °C) 99 9 °F (37 7 °C)   TempSrc:       SpO2: 96% 98% 96% 97%   Weight:       Height:           General Appearance: no distress, conversive  HEENT: PERRLA, conjuctiva normal; oropharynx clear; mucous membranes moist;   Neck:  Supple, no lymphadenopathy or thyromegaly  Lungs: CTA, normal respiratory effort, no retractions, expiratory effort normal  CV: regular rate and rhythm , PMI normal   ABD: soft non tender, no masses , no hepatic or splenomegaly  EXT: DP pulses intact, no lymphadenopathy, no edema; left shoulder dressing in place  Skin: normal turgor, normal texture, no rash  Psych: affect normal, mood normal  Neuro: AAOx3      The above physical exam was reviewed and updated as determined by my evaluation of the patient on 6/2/2021  Invasive Devices     Peripheral Intravenous Line            Peripheral IV 06/01/21 Right Antecubital 1 day    Peripheral IV 06/01/21 Right Wrist less than 1 day                   VTE Pharmacologic Prophylaxis: Sequential pneumatic compression stocking  Code Status: Level 1 - Full Code  Current Length of Stay: 0 day(s)      Total floor / unit time spent today 30 minutes  Coordination of patient's care was performed in conjunction with primary service   Time invested included review of patient's labs, vitals, and management of their comorbidities with continued monitoring, examination of patient as well as answering patient questions, documenting her findings and creating progress note in electronic medical record,  ordering appropriate diagnostic testing  ** Please Note:  voice to text software may have been used in the creation of this document   Although proof errors in transcription or interpretation are a potential of such software**

## 2021-06-03 ENCOUNTER — TRANSITIONAL CARE MANAGEMENT (OUTPATIENT)
Dept: INTERNAL MEDICINE CLINIC | Facility: CLINIC | Age: 76
End: 2021-06-03

## 2021-06-03 VITALS
TEMPERATURE: 99.2 F | OXYGEN SATURATION: 97 % | HEIGHT: 58 IN | RESPIRATION RATE: 20 BRPM | WEIGHT: 99 LBS | HEART RATE: 90 BPM | DIASTOLIC BLOOD PRESSURE: 66 MMHG | BODY MASS INDEX: 20.78 KG/M2 | SYSTOLIC BLOOD PRESSURE: 119 MMHG

## 2021-06-03 DIAGNOSIS — M12.812 ROTATOR CUFF ARTHROPATHY OF LEFT SHOULDER: ICD-10-CM

## 2021-06-03 PROBLEM — Z96.612 S/P REVERSE TOTAL SHOULDER ARTHROPLASTY, LEFT: Status: ACTIVE | Noted: 2021-06-03

## 2021-06-03 LAB
ANION GAP SERPL CALCULATED.3IONS-SCNC: 5 MMOL/L (ref 4–13)
BUN SERPL-MCNC: 9 MG/DL (ref 5–25)
CALCIUM SERPL-MCNC: 8.5 MG/DL (ref 8.3–10.1)
CHLORIDE SERPL-SCNC: 100 MMOL/L (ref 100–108)
CO2 SERPL-SCNC: 29 MMOL/L (ref 21–32)
CREAT SERPL-MCNC: 0.38 MG/DL (ref 0.6–1.3)
GFR SERPL CREATININE-BSD FRML MDRD: 103 ML/MIN/1.73SQ M
GLUCOSE SERPL-MCNC: 93 MG/DL (ref 65–140)
POTASSIUM SERPL-SCNC: 3.4 MMOL/L (ref 3.5–5.3)
SARS-COV-2 RNA RESP QL NAA+PROBE: NEGATIVE
SODIUM SERPL-SCNC: 134 MMOL/L (ref 136–145)

## 2021-06-03 PROCEDURE — 99024 POSTOP FOLLOW-UP VISIT: CPT | Performed by: PHYSICIAN ASSISTANT

## 2021-06-03 PROCEDURE — U0005 INFEC AGEN DETEC AMPLI PROBE: HCPCS | Performed by: ORTHOPAEDIC SURGERY

## 2021-06-03 PROCEDURE — 99232 SBSQ HOSP IP/OBS MODERATE 35: CPT | Performed by: INTERNAL MEDICINE

## 2021-06-03 PROCEDURE — U0003 INFECTIOUS AGENT DETECTION BY NUCLEIC ACID (DNA OR RNA); SEVERE ACUTE RESPIRATORY SYNDROME CORONAVIRUS 2 (SARS-COV-2) (CORONAVIRUS DISEASE [COVID-19]), AMPLIFIED PROBE TECHNIQUE, MAKING USE OF HIGH THROUGHPUT TECHNOLOGIES AS DESCRIBED BY CMS-2020-01-R: HCPCS | Performed by: ORTHOPAEDIC SURGERY

## 2021-06-03 PROCEDURE — 97530 THERAPEUTIC ACTIVITIES: CPT

## 2021-06-03 PROCEDURE — NC001 PR NO CHARGE: Performed by: ORTHOPAEDIC SURGERY

## 2021-06-03 PROCEDURE — TCMNV: Performed by: INTERNAL MEDICINE

## 2021-06-03 PROCEDURE — 97535 SELF CARE MNGMENT TRAINING: CPT

## 2021-06-03 PROCEDURE — 80048 BASIC METABOLIC PNL TOTAL CA: CPT | Performed by: PHYSICIAN ASSISTANT

## 2021-06-03 PROCEDURE — 97116 GAIT TRAINING THERAPY: CPT

## 2021-06-03 RX ORDER — ONDANSETRON 4 MG/1
4 TABLET, FILM COATED ORAL EVERY 8 HOURS PRN
Qty: 10 TABLET | Refills: 0
Start: 2021-06-03 | End: 2021-06-22 | Stop reason: ALTCHOICE

## 2021-06-03 RX ORDER — FAMOTIDINE 20 MG/1
20 TABLET, FILM COATED ORAL DAILY
Qty: 30 TABLET | Refills: 0
Start: 2021-06-04 | End: 2021-07-06

## 2021-06-03 RX ORDER — LANOLIN ALCOHOL/MO/W.PET/CERES
3 CREAM (GRAM) TOPICAL
Qty: 15 TABLET | Refills: 0
Start: 2021-06-03 | End: 2021-06-18

## 2021-06-03 RX ORDER — POTASSIUM CHLORIDE 20 MEQ/1
20 TABLET, EXTENDED RELEASE ORAL ONCE
Status: COMPLETED | OUTPATIENT
Start: 2021-06-03 | End: 2021-06-03

## 2021-06-03 RX ORDER — OXYCODONE HYDROCHLORIDE 5 MG/1
TABLET ORAL
Qty: 13 TABLET | Refills: 0 | Status: SHIPPED | OUTPATIENT
Start: 2021-06-03 | End: 2021-06-03 | Stop reason: SDUPTHER

## 2021-06-03 RX ORDER — ACETAMINOPHEN 325 MG/1
650 TABLET ORAL EVERY 6 HOURS PRN
Qty: 80 TABLET | Refills: 0
Start: 2021-06-03 | End: 2021-06-13

## 2021-06-03 RX ORDER — OXYCODONE HYDROCHLORIDE 5 MG/1
TABLET ORAL
Qty: 13 TABLET | Refills: 0 | Status: SHIPPED | OUTPATIENT
Start: 2021-06-03 | End: 2021-07-27 | Stop reason: ALTCHOICE

## 2021-06-03 RX ADMIN — OXYCODONE HYDROCHLORIDE 5 MG: 5 TABLET ORAL at 11:41

## 2021-06-03 RX ADMIN — FOLIC ACID 1000 MCG: 1 TABLET ORAL at 08:23

## 2021-06-03 RX ADMIN — GABAPENTIN 300 MG: 300 CAPSULE ORAL at 08:24

## 2021-06-03 RX ADMIN — FAMOTIDINE 20 MG: 20 TABLET ORAL at 08:24

## 2021-06-03 RX ADMIN — Medication 1000 UNITS: at 08:25

## 2021-06-03 RX ADMIN — DOCUSATE SODIUM 100 MG: 100 CAPSULE ORAL at 08:23

## 2021-06-03 RX ADMIN — POTASSIUM CHLORIDE 20 MEQ: 1500 TABLET, EXTENDED RELEASE ORAL at 08:24

## 2021-06-03 RX ADMIN — OXYCODONE HYDROCHLORIDE 5 MG: 5 TABLET ORAL at 06:17

## 2021-06-03 RX ADMIN — OXYCODONE HYDROCHLORIDE AND ACETAMINOPHEN 1000 MG: 500 TABLET ORAL at 08:24

## 2021-06-03 RX ADMIN — METHOTREXATE SODIUM 7.5 MG: 2.5 TABLET ORAL at 08:26

## 2021-06-03 RX ADMIN — Medication 1 TABLET: at 08:23

## 2021-06-03 RX ADMIN — PREDNISONE 5 MG: 5 TABLET ORAL at 08:24

## 2021-06-03 RX ADMIN — IRON SUCROSE 200 MG: 20 INJECTION, SOLUTION INTRAVENOUS at 09:45

## 2021-06-03 RX ADMIN — HYDROXYCHLOROQUINE SULFATE 200 MG: 200 TABLET, FILM COATED ORAL at 08:25

## 2021-06-03 RX ADMIN — LEVOTHYROXINE SODIUM 25 MCG: 25 TABLET ORAL at 06:16

## 2021-06-03 RX ADMIN — Medication 400 UNITS: at 08:25

## 2021-06-03 NOTE — PROGRESS NOTES
Internal Medicine Progress Note  Patient: Kiah Atwood Colon  Age/sex: 67 y o  female  Medical Record #: [id number]      ASSESSMENT/PLAN: (Interval History)  Magaly Godinez is seen and examined and management for following issues:    Status Post left Total SHOULDER ARTHROPLASTY   Pain controlled   Continue encourage incentive spirometry; monitor fever curve   DVT prophylaxis in place and reviewed  Results from last 7 days   Lab Units 06/02/21  0742   WBC Thousand/uL 9 30   HEMOGLOBIN g/dL 10 4*   HEMATOCRIT % 34 2*   PLATELETS Thousands/uL 289          Operative acute blood loss anemia  · Decrease in hgb levels from preop labs results; suspect due to post operation extravasation losses along with potential dilutional effects of fluids  · Monitor follow up CBC post intervention to trend effect    Rheumatoid arthritis  · Takes plaquenil 200mg in a m  100mg pm; prednisone 5mg daily and methotrexate 7 5mg on thursdays  · Will cont same as above     Lupus  · Same as above     Hypothyroid  · Continue levothyroxine 25mcg daily     Mild hyponatremia  · Likely secondary to poor appetite  · Improving  GERD  · Cont PPI  · Monitor for nausea/vomiting     Chronic anemia  · Start venofer x 2 doses    Hypokalemia  · K replaced  · Will continue to mon        PRE-OP HGB LEVEL: 11 8    The above assessment and plan was reviewed and updated as determined by my evaluation of the patient on 6/3/2021      Labs:   Results from last 7 days   Lab Units 06/02/21  0742   WBC Thousand/uL 9 30   HEMOGLOBIN g/dL 10 4*   HEMATOCRIT % 34 2*   PLATELETS Thousands/uL 289     Results from last 7 days   Lab Units 06/03/21  0521 06/02/21  0603   SODIUM mmol/L 134* 131*   POTASSIUM mmol/L 3 4* 3 5   CHLORIDE mmol/L 100 97*   CO2 mmol/L 29 22   BUN mg/dL 9 5   CREATININE mg/dL 0 38* 0 81   CALCIUM mg/dL 8 5 6 9*             Results from last 7 days   Lab Units 06/01/21  1225   POC GLUCOSE mg/dl 120       Review of Scheduled Meds:  Current Facility-Administered Medications   Medication Dose Route Frequency Provider Last Rate    acetaminophen  650 mg Oral Q6H PRN Yaz Grange, PA-C      vitamin C  1,000 mg Oral Daily Yaz Grange, PA-C      calcium carbonate  1,000 mg Oral Daily PRN Yaz Grange, PA-C      cholecalciferol  1,000 Units Oral Daily Yaz Grange, PA-C      docusate sodium  100 mg Oral BID Yaz Grange, PA-C      famotidine  20 mg Oral Daily Yaz Grange, PA-C      folic acid  6,089 mcg Oral Daily Yaz Grange, PA-C      gabapentin  300 mg Oral TID Yaz Grange, PA-C      HYDROmorphone  0 5 mg Intravenous Q2H PRN Yaz Grange, PA-C      hydroxychloroquine  100 mg Oral QPM Yaz Grange, PA-C      hydroxychloroquine  200 mg Oral Daily With Breakfast Yaz Grange, PA-C      iron sucrose  200 mg Intravenous Daily CONNIE Rodriguez      lactated ringers  125 mL/hr Intravenous Continuous Nathalia Polanco MD Stopped (06/01/21 1305)    levothyroxine  25 mcg Oral Early Morning Yaz Grange, PA-C      melatonin  3 mg Oral HS Angella Burkett MD      methotrexate  7 5 mg Oral Weekly Yaz Grange, PA-C      multivitamin-minerals  1 tablet Oral Daily Yaz Grange, PA-C      nortriptyline  50 mg Oral HS Yaz Grange, PA-C      ondansetron  4 mg Intravenous Q6H PRN Yaz Grange, PA-C      oxyCODONE  2 5 mg Oral Q4H PRN Yaz Grange, PA-C      oxyCODONE  5 mg Oral Q4H PRN Yaz Grange, PA-C      predniSONE  5 mg Oral Daily Yaz Grange, PA-C      psyllium  1 packet Oral TID Yaz Grange, PA-C      vitamin E (tocopherol)  400 Units Oral Daily Yaz Grange, PA-C         Subjective/ HPI: Gypsy Duck Colon seen and examined  Patient's overnight issues or events were reviewed with nursing or staff during rounds or morning huddle session   New or overnight issues include the following:     Pt without any overnight events or reported nursing issues      ROS:   A 10 point ROS was performed; negative except as noted above  Imaging:     XR shoulder left 1 view    (Results Pending)       *Labs /Radiology studies Reviewed  *Medications  reviewed and reconciled as needed  *Please refer to order section for additional ordered labs studies  *Case discussed with primary attending during morning huddle case rounds    Physical Examination:  Vitals:   Vitals:    06/02/21 1941 06/02/21 2151 06/02/21 2308 06/03/21 0814   BP: 137/75  132/69 119/66   BP Location:       Pulse: 95 100 88 90   Resp: 16  17 20   Temp: (!) 101 8 °F (38 8 °C) 100 1 °F (37 8 °C) 99 7 °F (37 6 °C) 99 2 °F (37 3 °C)   TempSrc:       SpO2: 96% 95% 97% 97%   Weight:       Height:           General Appearance: no distress, conversive  HEENT: PERRLA, conjuctiva normal; oropharynx clear; mucous membranes moist;   Neck:  Supple, no lymphadenopathy or thyromegaly  Lungs: CTA, normal respiratory effort, no retractions, expiratory effort normal  CV: regular rate and rhythm , PMI normal   ABD: soft non tender, no masses , no hepatic or splenomegaly  EXT: DP pulses intact, no lymphadenopathy, no edema; left shoulder dressing in place  Skin: normal turgor, normal texture, no rash  Psych: affect normal, mood normal  Neuro: AAOx3      The above physical exam was reviewed and updated as determined by my evaluation of the patient on 6/3/2021  Invasive Devices     Peripheral Intravenous Line            Peripheral IV 06/01/21 Right Antecubital 2 days    Peripheral IV 06/01/21 Right Wrist 1 day                   VTE Pharmacologic Prophylaxis: Sequential pneumatic compression stocking  Code Status: Level 1 - Full Code  Current Length of Stay: 1 day(s)      Total floor / unit time spent today 30 minutes  Coordination of patient's care was performed in conjunction with primary service   Time invested included review of patient's labs, vitals, and management of their comorbidities with continued monitoring, examination of patient as well as answering patient questions, documenting her findings and creating progress note in electronic medical record,  ordering appropriate diagnostic testing  ** Please Note:  voice to text software may have been used in the creation of this document   Although proof errors in transcription or interpretation are a potential of such software**

## 2021-06-03 NOTE — PHYSICAL THERAPY NOTE
Physical Therapy Progress Note     06/03/21 0834   PT Last Visit   PT Visit Date 06/03/21   Note Type   Note Type Treatment   Pain Assessment   Pain Assessment Tool FLACC   Pain Rating: FLACC (Rest) - Face 1   Pain Rating: FLACC (Rest) - Legs 0   Pain Rating: FLACC (Rest) - Activity 0   Pain Rating: FLACC (Rest) - Cry 1   Pain Rating: FLACC (Rest) - Consolability 0   Score: FLACC (Rest) 2   Pain Rating: FLACC (Activity) - Face 1   Pain Rating: FLACC (Activity) - Legs 0   Pain Rating: FLACC (Activity) - Activity 1   Pain Rating: FLACC (Activity) - Cry 1   Pain Rating: FLACC (Activity) - Consolability 0   Score: FLACC (Activity) 3   Restrictions/Precautions   LUE Weight Bearing Per Order NWB   Braces or Orthoses Sling  (shoulder abduction sling)   Other Precautions Cognitive; Chair Alarm; Bed Alarm;Pain; Fall Risk   Subjective   Subjective pt encountered supine in bed, pleasant and agreeable to treatment  Reports no new complaints  States "My legs won't work " but then reported she wasmore confident after walking with AD  Bed Mobility   Supine to Sit 3  Moderate assistance   Additional items Assist x 1   Transfers   Sit to Stand 3  Moderate assistance   Additional items Assist x 1   Stand to Sit 3  Moderate assistance   Additional items Assist x 1   Stand pivot 3  Moderate assistance   Additional items Assist x 2   Ambulation/Elevation   Gait pattern Excessively slow; Step to;Short stride; Foward flexed; Inconsistent ora; Shuffling;Decreased foot clearance; Improper Weight shift; Poor UE support   Gait Assistance 3  Moderate assist   Additional items Assist x 1  (+ 2nd for chair follow/ assist with AD management)   Assistive Device 1 handed walker   Distance 10', 3'   Balance   Static Sitting Fair   Static Standing Poor +   Ambulatory Poor   Endurance Deficit   Endurance Deficit Yes   Endurance Deficit Description fatigue, generalized weakness   Activity Tolerance   Activity Tolerance Patient tolerated treatment well;Patient limited by fatigue   Nurse Made Aware MARYAM Guerrero   Assessment   Prognosis Good   Problem List Decreased strength;Decreased range of motion;Decreased endurance; Impaired balance;Decreased mobility; Decreased cognition; Impaired judgement;Decreased safety awareness;Pain;Orthopedic restrictions   Assessment Pt continues to require assist with all functional mobility at this time due to weakness, fatigue & orthopaedic restrictions that limit pt's ability to assist herself  Pt able to progress ambulatory distances with use of 1 handed walker, but required extensive time, assist & instructions for sequencing throughout  Pt initially required assist to manage AD while ambulating in straight line, but then began doing so on her own   did require assist again when pivoting to [facility] & back to bed  Pt required assist for sling management & ADLs as per OT note, but was able to maintain balance in sitting & standing without incident  Returned to supine post session at pt request   POC and discharge plan remain appropriate to safely progress mobility with LRAD  Barriers to Discharge Inaccessible home environment;Decreased caregiver support   Goals   Patient Goals to get stronger   STG Expiration Date 06/12/21   PT Treatment Day 1   Plan   Treatment/Interventions Functional transfer training;LE strengthening/ROM; Endurance training;Cognitive reorientation; Therapeutic exercise;Patient/family training;Bed mobility;Gait training;Equipment eval/education   PT Frequency Other (Comment)  (3-6x/week)   Recommendation   PT Discharge Recommendation Post acute rehabilitation services   Equipment Recommended   (1 handed walker vs LRAD at this time)   AM-PAC Basic Mobility Inpatient   Turning in Bed Without Bedrails 2   Lying on Back to Sitting on Edge of Flat Bed 2   Moving Bed to Chair 1   Standing Up From Chair 2   Walk in Room 2   Climb 3-5 Stairs 1   Basic Mobility Inpatient Raw Score 10   Turning Head Towards Sound 4 Follow Simple Instructions 3   Low Function Basic Mobility Raw Score 17   Low Function Basic Mobility Standardized Score 27 46   The patient's AM-PAC Basic Mobility Inpatient Short Form Low Function Raw Score 17 , Standardized Score is 27 46  A standardized score less 42 9 suggests the patient may benefit from discharge to post-acute rehab services  Please also refer to the recommendation of the Physical Therapist for safe discharge planning        Viji Meza PTA

## 2021-06-03 NOTE — CASE MANAGEMENT
Per [employer], [facility] is able to accept pt pending Covid test  CM requested Ortho resident Michelle Cisneros order Covid test  CM spoke with Karrie Durham from [facility] admissions who informed that she would call CM back to inform if they are able to provide transportation for pt  CM to follow  1115    Per [place] communication, [facility] able to provide transportation with a 3:00 PM pickup time  CM informed pt, pt's niece Cha Lozoya RN, and Ortho resident Michelle Cisneros of transportation time       Report can be called to P: [phone]; F: [phone]

## 2021-06-03 NOTE — OCCUPATIONAL THERAPY NOTE
Occupational Therapy Treatment Note:       06/03/21 0935   OT Last Visit   OT Visit Date 06/03/21   Note Type   Note Type Treatment   Restrictions/Precautions   Weight Bearing Precautions Per Order Yes   LUE Weight Bearing Per Order NWB   Braces or Orthoses Sling  (abduction)   Other Precautions Cognitive;WBS;Fall Risk;Pain   Pain Assessment   Pain Score 4   Pain Location/Orientation Orientation: Left; Location: Arm   ADL   Where Assessed Edge of bed   UB Dressing Assistance 3  Moderate Assistance   UB Dressing Deficit Thread RUE; Thread LUE   UB Dressing Comments Max to doff/don brace   Toileting Assistance  3  Moderate Assistance   Toileting Deficit Clothing management up;Clothing management down;Perineal hygiene   Toileting Comments in stance at one handed walker   Functional Standing Tolerance   Time ~ 2 mins   Activity static standing   Comments one handed walker   Bed Mobility   Supine to Sit 3  Moderate assistance   Additional items Assist x 1   Sit to Supine 3  Moderate assistance   Additional items Assist x 1   Transfers   Sit to Stand 3  Moderate assistance   Additional items Assist x 1   Stand to Sit 3  Moderate assistance   Additional items Assist x 1   Stand pivot 3  Moderate assistance   Additional items Assist x 1   Additional Comments one handed walker   Functional Mobility   Functional Mobility 3  Moderate assistance   Additional Comments ax1  (one handed walker)   Cognition   Overall Cognitive Status Impaired   Arousal/Participation Responsive   Attention Attends with cues to redirect   Orientation Level Oriented X4   Memory Decreased recall of precautions   Following Commands Follows one step commands with increased time or repetition   Comments Pleasnt and cooperative, does not recall precautions or restrictions, or learned techniques    Activity Tolerance   Activity Tolerance Patient limited by fatigue   Medical Staff Made Aware NSG aware    Assessment   Assessment Pt was seen this date for OT tx session focusing on self care tasks, bed mobility, sit to stand progressions, standing tolerance, tranfers, functional mobility, safety awareness, compensatory techniques, energy conservation,  and overall activity tolerance  Pt presents , completes previously mentioned tasks at documented assist levels please see above in flow sheet  Continues to require increased assist for all functional activities  Decreased recall of precautions and little nto no carryover of previous education on precautions and techniques  Pt resting in supine at end of session with all needs in reach, alarm on  Would benefit from continued OT tx to improve overall functional abilities  Continue to follow with current POC      Plan   Treatment Interventions ADL retraining   Goal Expiration Date 06/16/21   OT Treatment Day 1   OT Frequency 3-5x/wk   Recommendation   OT Discharge Recommendation Post acute rehabilitation services   AM-PAC Daily Activity Inpatient   Lower Body Dressing 2   Bathing 2   Toileting 2   Upper Body Dressing 2   Grooming 3   Eating 3   Daily Activity Raw Score 14   Daily Activity Standardized Score (Calc for Raw Score >=11) 33 39   AM-PAC Applied Cognition Inpatient   Following a Speech/Presentation 3   Understanding Ordinary Conversation 2   Taking Medications 3   Remembering Where Things Are Placed or Put Away 3   Remembering List of 4-5 Errands 2   Taking Care of Complicated Tasks 2   Applied Cognition Raw Score 15   Applied Cognition Standardized Score 33 54     [facility] ERIN Varghese

## 2021-06-03 NOTE — PROGRESS NOTES
Orthopedics Progress / Post-op Note  Monet Botello Colon 67 y o  female MRN: [id number]  Unit/Bed#: -01      Subjective:  67 y  o female post operative day 2 s/p left reverse total shoulder arthroplasty  Complains of continued pain left shoulder, about the same as yesterday  No acute issues overnight      Objective:    Labs:  0   Lab Value Date/Time    HCT 34 2 (L) 06/02/2021 0742    HCT 37 7 04/12/2021 0814    HCT 34 9 10/27/2020 1455    HCT 36 8 12/16/2014 0727    HCT 28 8 (L) 10/30/2014 0626    HCT 29 2 (L) 10/27/2014 0648    HGB 10 4 (L) 06/02/2021 0742    HGB 11 8 04/12/2021 0814    HGB 10 5 (L) 10/27/2020 1455    HGB 11 9 12/16/2014 0727    HGB 8 8 (L) 10/30/2014 0626    HGB 9 0 (L) 10/27/2014 0648    INR 0 96 05/11/2021 0935    INR 0 96 10/13/2014 0608    WBC 9 30 06/02/2021 0742    WBC 6 43 04/12/2021 0814    WBC 5 98 10/27/2020 1455    WBC 5 60 12/16/2014 0727    WBC 5 58 10/30/2014 0626    WBC 6 95 10/27/2014 0648    ESR 15 04/12/2021 0814    CRP <3 0 04/12/2021 0814       Meds:    Current Facility-Administered Medications:     acetaminophen (TYLENOL) tablet 650 mg, 650 mg, Oral, Q6H PRN, Era Leanne PA-C, 650 mg at 06/02/21 2210    ascorbic acid (VITAMIN C) tablet 1,000 mg, 1,000 mg, Oral, Daily, Era Leanne, PA-C, 1,000 mg at 06/02/21 5508    calcium carbonate (TUMS) chewable tablet 1,000 mg, 1,000 mg, Oral, Daily PRN, Era Leanne PA-C    cholecalciferol (VITAMIN D3) tablet 1,000 Units, 1,000 Units, Oral, Daily, Era Leanne PA-C, 1,000 Units at 06/02/21 3314    docusate sodium (COLACE) capsule 100 mg, 100 mg, Oral, BID, Era Fanti, PA-C, 100 mg at 06/02/21 1718    famotidine (PEPCID) tablet 20 mg, 20 mg, Oral, Daily, Era Fanti, PA-C, 20 mg at 81/34/94 6656    folic acid (FOLVITE) tablet 1,000 mcg, 1,000 mcg, Oral, Daily, Era Leanne, PA-C, 1,000 mcg at 06/02/21 9336    gabapentin (NEURONTIN) capsule 300 mg, 300 mg, Oral, TID, Era Nawafti, PA-C, 300 mg at 06/02/21 2210    HYDROmorphone (DILAUDID) injection 0 5 mg, 0 5 mg, Intravenous, Q2H PRN, Jaunita Pee, PA-C    hydroxychloroquine (PLAQUENIL) tablet 100 mg, 100 mg, Oral, QPM, Jaunita Pee, PA-C, 100 mg at 06/02/21 1718    hydroxychloroquine (PLAQUENIL) tablet 200 mg, 200 mg, Oral, Daily With Breakfast, Jaunita Pee, PA-C, 200 mg at 06/02/21 0815    iron sucrose (VENOFER) 200 mg in sodium chloride 0 9 % 100 mL IVPB, 200 mg, Intravenous, Daily, Lula Humphries, CRNP, 200 mg at 06/02/21 1016    lactated ringers infusion, 125 mL/hr, Intravenous, Continuous, Zeny Guajardo MD, Stopped at 06/01/21 1305    levothyroxine tablet 25 mcg, 25 mcg, Oral, Early Morning, Jaunita Pee, PA-C, 25 mcg at 06/02/21 0501    melatonin tablet 3 mg, 3 mg, Oral, HS, Yohana Rodriguez MD, 3 mg at 06/02/21 2210    methotrexate tablet 7 5 mg, 7 5 mg, Oral, Weekly, Jaunita Pee, PA-C    multivitamin-minerals (CENTRUM) tablet 1 tablet, 1 tablet, Oral, Daily, Jaunita Pee, PA-C, 1 tablet at 06/02/21 0814    nortriptyline (PAMELOR) capsule 50 mg, 50 mg, Oral, HS, Jaunita Pee, PA-C, 50 mg at 06/02/21 2211    ondansetron (ZOFRAN) injection 4 mg, 4 mg, Intravenous, Q6H PRN, Jaunita Pee, PA-C, 4 mg at 06/02/21 2041    oxyCODONE (ROXICODONE) IR tablet 2 5 mg, 2 5 mg, Oral, Q4H PRN, Jaunita Pee, PA-C    oxyCODONE (ROXICODONE) IR tablet 5 mg, 5 mg, Oral, Q4H PRN, Jaunita Pee, PA-C, 5 mg at 06/02/21 2210    predniSONE tablet 5 mg, 5 mg, Oral, Daily, Jaunita Pee, PA-C, 5 mg at 06/02/21 2907    psyllium (METAMUCIL) 1 packet, 1 packet, Oral, TID, Mingo South PA-C    vitamin E (tocopherol) capsule 400 Units, 400 Units, Oral, Daily, Mingo South PA-C    Blood Culture:   Lab Results   Component Value Date    BLOODCX No Growth After 5 Days  12/02/2019    BLOODCX No Growth After 5 Days  12/02/2019       Wound Culture:   No results found for: WOUNDCULT    Ins and Outs:  I/O last 24 hours:   In: 120 [P O :120]  Out: 918 [Urine:775]      Orthopedic Physical Exam:  Vitals:    06/02/21 2308   BP: 132/69   Pulse: 88   Resp: 17   Temp: 99 7 °F (37 6 °C)   SpO2: 97%   Sitting upright, mild distress related to pain  left Upper Extremity extremity:  · Mepilex Dressings C/D/I, no saturation or drainage  Incision is c/d/i  · 5/5 strength with thumb retropulsion, finger flexion/extension,  strength  · Sensation intact to axillary, musculotaneous, m/r/u nerve distribution  · Palpable radial pulse  · Good cap refill at the fingertips    _*_*_*_*_*_*_*_*_*_*_*_*_*_*_*_*_*_*_*_*_*_*_*_*_*_*_*_*_*_*_*_*_*_*_*_*_*_*_*_*_*    Assessment: 67 y  o female  post operative day 2 s/p left reverse total shoulder arthroplasty          Plan:  · NWB LUE in sling, may remove for pendulums and hygiene   · Up and out of bed  · DVT prophylaxis (mechanical)  · Analgesics  · Ice to the area  · PT/OT  · Dispo: Ortho will follow  · Will continue to assess for acute blood loss anemia        Ye Machuca PA-C

## 2021-06-03 NOTE — PLAN OF CARE
Problem: OCCUPATIONAL THERAPY ADULT  Goal: Performs self-care activities at highest level of function for planned discharge setting  See evaluation for individualized goals  Description: Treatment Interventions: ADL retraining, Functional transfer training, UE strengthening/ROM, Endurance training, Cognitive reorientation, Patient/family training, Equipment evaluation/education, Compensatory technique education, Energy conservation, Activityengagement          See flowsheet documentation for full assessment, interventions and recommendations  Outcome: Progressing  Note: Limitation: Decreased ADL status, Decreased Safe judgement during ADL, Decreased cognition, Decreased endurance, Decreased UE ROM, Decreased self-care trans, Decreased high-level ADLs  Prognosis: Fair  Assessment: Pt was seen this date for OT tx session focusing on self care tasks, bed mobility, sit to stand progressions, standing tolerance, tranfers, functional mobility, safety awareness, compensatory techniques, energy conservation,  and overall activity tolerance  Pt presents , completes previously mentioned tasks at documented assist levels please see above in flow sheet  Continues to require increased assist for all functional activities  Decreased recall of precautions and little nto no carryover of previous education on precautions and techniques  Pt resting in supine at end of session with all needs in reach, alarm on  Would benefit from continued OT tx to improve overall functional abilities  Continue to follow with current POC  OT Discharge Recommendation: Post acute rehabilitation services  OT - OK to Discharge:  Yes

## 2021-06-03 NOTE — DISCHARGE SUMMARY
ORTHOPEDICS DISCHARGE SUMMARY  Geetha Rivera 67 y o  female MRN: [id number]  Unit/Bed#: -01    Attending Physician: Jerry Francois    Admitting diagnosis: Rotator cuff tear arthropathy of left shoulder [M75 102, M12 812]  Acquired subluxation of left shoulder, initial encounter [S43 002A]    Discharge diagnosis: Rotator cuff tear arthropathy of left shoulder [M75 102, M12 812]  Acquired subluxation of left shoulder, initial encounter [S43 002A]    Date of admission: 6/1/2021    Date of discharge: 06/03/21         Procedure: Left reverse total shoulder arthroplasty    HPI:  This is a 67y o  year old female that presented to the office with signs and symptoms of left shoulder osteoarthritis and/or other pathology  They tried and failed conservative treatment measures and wished to proceed with surgical intervention  The risks, benefits, and complications of the procedure were discussed with the patient and informed consent was obtained  Hospital Course: The patient was admitted to the hospital on 6/1/2021 and underwent an uncomplicated left reverse total shoulder arthroplasty  They were transferred to the floor after a brief stay in the post-anesthesia care unit  Their pain was well managed with IV and oral pain medications   On discharge date pt was cleared by PT and the medicine team and determined to be safe for discharge    0   Lab Value Date/Time    HGB 10 4 (L) 06/02/2021 0742    HGB 11 8 04/12/2021 0814    HGB 10 5 (L) 10/27/2020 1455    HGB 11 0 (L) 10/01/2020 0835    HGB 8 5 (L) 09/08/2020 0512    HGB 9 8 (L) 09/07/2020 0504    HGB 8 8 (L) 09/06/2020 1051    HGB 9 0 (L) 09/03/2020 0516    HGB 10 9 (L) 08/24/2020 1236    HGB 11 1 (L) 02/05/2020 1534    HGB 10 3 (L) 12/07/2019 0517    HGB 10 4 (L) 12/06/2019 0553    HGB 10 6 (L) 12/05/2019 0550    HGB 11 0 (L) 12/04/2019 0531    HGB 10 0 (L) 12/03/2019 0430    HGB 9 9 (L) 12/02/2019 0506    HGB 9 8 (L) 12/01/2019 0511    HGB 12 0 11/30/2019 1338    HGB 10 4 (L) 07/05/2019 0630    HGB 11 3 (L) 11/20/2018 0434    HGB 11 4 (L) 11/13/2018 0152    HGB 11 9 08/22/2018 0713    HGB 10 0 (L) 11/29/2017 0437    HGB 8 2 (L) 11/28/2017 0511    HGB 12 0 11/06/2017 0638    HGB 11 9 09/01/2017 0701    HGB 12 9 08/29/2017 2145    HGB 12 6 08/22/2017 1445    HGB 12 9 04/25/2017 0639    HGB 12 4 03/01/2016 0740    HGB 11 9 12/16/2014 0727    HGB 8 8 (L) 10/30/2014 0626    HGB 9 0 (L) 10/27/2014 0648    HGB 8 4 (L) 10/23/2014 0640    HGB 8 9 (L) 10/20/2014 0632    HGB 9 5 (L) 10/16/2014 0608    HGB 10 4 (L) 10/15/2014 0613    HGB 9 0 (L) 10/14/2014 0549    HGB 10 2 (L) 10/13/2014 0608    HGB 12 1 10/12/2014 0826       Greater than 2 gram drop which qualifies for diagnosis of acute blood loss anemia  Vital signs remained stable and pt was resuscitated with IVF as needed   Body mass index is 20 69 kg/m²  Normal weight  Recommend nutrition and physical activity  Discharge Instructions: The patient was discharged nonweight bearing to the left upper extremity  Refrain from PT/OT until cleared by surgeon  Take pain medications as instructed  Discharge Medications: For the complete list of discharge medications, please refer to the patient's medication reconciliation

## 2021-06-03 NOTE — PLAN OF CARE
Problem: PHYSICAL THERAPY ADULT  Goal: Performs mobility at highest level of function for planned discharge setting  See evaluation for individualized goals  Description: Treatment/Interventions: Functional transfer training, LE strengthening/ROM, Therapeutic exercise, Endurance training, Equipment eval/education, Patient/family training, Bed mobility, Gait training, Spoke to nursing, OT, Spoke to MD  Equipment Recommended: (continue to assess )       See flowsheet documentation for full assessment, interventions and recommendations  Outcome: Progressing  Note: Prognosis: Good  Problem List: Decreased strength, Decreased range of motion, Decreased endurance, Impaired balance, Decreased mobility, Decreased cognition, Impaired judgement, Decreased safety awareness, Pain, Orthopedic restrictions  Assessment: Pt continues to require assist with all functional mobility at this time due to weakness, fatigue & orthopaedic restrictions that limit pt's ability to assist herself  Pt able to progress ambulatory distances with use of 1 handed walker, but required extensive time, assist & instructions for sequencing throughout  Pt initially required assist to manage AD while ambulating in straight line, but then began doing so on her own   did require assist again when pivoting to [facility] & back to bed  Pt required assist for sling management & ADLs as per OT note, but was able to maintain balance in sitting & standing without incident  Returned to supine post session at pt request   POC and discharge plan remain appropriate to safely progress mobility with LRAD  Barriers to Discharge: Inaccessible home environment, Decreased caregiver support  Barriers to Discharge Comments: steps to manage at home, limited social support      PT Discharge Recommendation: Post acute rehabilitation services     PT - OK to Discharge: Yes(to rehab when medically cleared)    See flowsheet documentation for full assessment

## 2021-06-04 ENCOUNTER — PATIENT OUTREACH (OUTPATIENT)
Dept: CASE MANAGEMENT | Facility: OTHER | Age: 76
End: 2021-06-04

## 2021-06-10 ENCOUNTER — PATIENT OUTREACH (OUTPATIENT)
Dept: CASE MANAGEMENT | Facility: OTHER | Age: 76
End: 2021-06-10

## 2021-06-10 NOTE — PROGRESS NOTES
This CM Assistant received an update on the patient  The patient is functioning at the current levels  NWB to LUE with immobilizer in place  Dressing to surgical incision dry and intact-no s/s of infection  F/u with ortho on 6/14  VSS  Pain is managed with routine Gabapentin and Oxycodone 5mg every 6 hours PRN for pain  Assist x 1 with HHA for transfers and ambulation  Full code  House diet  Labs done on 6/4-  Hgb 9 6  Cr 0 36  BUN 11    WBC 10 8     Labs scheduled on 6/11- CBC, Mag, and BMP    No LCD at this time  This care manager assistant will continue to monitor via chart review throughout bundle episode

## 2021-06-14 ENCOUNTER — APPOINTMENT (OUTPATIENT)
Dept: RADIOLOGY | Facility: OTHER | Age: 76
End: 2021-06-14
Payer: MEDICARE

## 2021-06-14 ENCOUNTER — OFFICE VISIT (OUTPATIENT)
Dept: OBGYN CLINIC | Facility: OTHER | Age: 76
End: 2021-06-14

## 2021-06-14 VITALS
HEART RATE: 86 BPM | DIASTOLIC BLOOD PRESSURE: 69 MMHG | WEIGHT: 99 LBS | BODY MASS INDEX: 20.78 KG/M2 | SYSTOLIC BLOOD PRESSURE: 116 MMHG | HEIGHT: 58 IN

## 2021-06-14 DIAGNOSIS — Z47.1 AFTERCARE FOLLOWING LEFT SHOULDER JOINT REPLACEMENT SURGERY: Primary | ICD-10-CM

## 2021-06-14 DIAGNOSIS — Z96.612 AFTERCARE FOLLOWING LEFT SHOULDER JOINT REPLACEMENT SURGERY: Primary | ICD-10-CM

## 2021-06-14 DIAGNOSIS — M25.512 LEFT SHOULDER PAIN, UNSPECIFIED CHRONICITY: ICD-10-CM

## 2021-06-14 PROCEDURE — 99024 POSTOP FOLLOW-UP VISIT: CPT | Performed by: PHYSICIAN ASSISTANT

## 2021-06-14 PROCEDURE — 1124F ACP DISCUSS-NO DSCNMKR DOCD: CPT | Performed by: PHYSICIAN ASSISTANT

## 2021-06-14 PROCEDURE — 73030 X-RAY EXAM OF SHOULDER: CPT

## 2021-06-14 NOTE — PROGRESS NOTES
Assessment:       1  Aftercare following left shoulder joint replacement surgery          Plan:        Patient is doing well postoperatively  Operative note, images, and rehab protocol were discussed  Her granddaughter who was present at today's visit specified that Dr Jerry Francois mentioned therapy was not to start until later based on intraoperative findings  I will ask Dr Jerry Francois to specify start date for rehab protocol  All questions were addressed to the patient's satisfaction  Follow-up will be in 2 months to assess patient's progress  Regarding paresthesia in her right hand, I recommended that she make an appointment for hand specialist to address these symptoms as I do not believe at this time that they are related to her right reverse total shoulder arthroplasty from 9/ 2020  Subjective:     Patient ID: Bernardo Irizarry is a 67 y o  female  Chief Complaint:    HPI    Patient presents to the office for follow-up status post left reverse total shoulder arthroplasty on 06/01/2021  She states her pain is improving  She brought attention to the residual hematoma in her left upper arm  She has not had PT today  She has been in a rehab facility since discharge from the hospital   She is scheduled to go home tomorrow  Social History     Occupational History    Occupation: RETIRED    Tobacco Use    Smoking status: Former Smoker    Smokeless tobacco: Never Used    Tobacco comment: quit 20-30 years ago   Vaping Use    Vaping Use: Never used   Substance and Sexual Activity    Alcohol use: Not Currently    Drug use: Not Currently    Sexual activity: Not Currently      Review of Systems   Constitutional: Negative  Respiratory: Negative  Musculoskeletal: Positive for myalgias  Negative for arthralgias  Skin: Positive for wound  Neurological: Positive for weakness and numbness  Psychiatric/Behavioral: Negative              Objective:         Neurological Testing     Additional Neurological Details  No paresthesia in left upper extremity  Physical Exam  HENT:      Head: Atraumatic  Cardiovascular:      Pulses: Normal pulses  Pulmonary:      Effort: Pulmonary effort is normal    Musculoskeletal:      Comments: Left Arm in sling  Range of motion and strength not tested  Skin:     General: Skin is warm and dry  Capillary Refill: Capillary refill takes less than 2 seconds  Comments: Left shoulder surgical incision dry and clean  Staples removed, Steri-Strips applied  Residual hematoma noted above left elbow  Neurological:      Mental Status: She is alert and oriented to person, place, and time  Comments: No paresthesia in left upper extremity  Psychiatric:         Mood and Affect: Mood normal          Behavior: Behavior normal            I have personally reviewed pertinent films in PACS and my interpretation is Consistent with left reverse total shoulder arthroplasty  Prosthesis position is as anticipated  Anamaria Vargas

## 2021-06-14 NOTE — PATIENT INSTRUCTIONS
1  Sling as per protocol  2  Will touch base tomorrow to specify start date for therapy   3   Follow-up in 2 months

## 2021-06-15 ENCOUNTER — PATIENT OUTREACH (OUTPATIENT)
Dept: CASE MANAGEMENT | Facility: OTHER | Age: 76
End: 2021-06-15

## 2021-06-15 NOTE — PROGRESS NOTES
This CM Assistant received an email from [address] [place] [address] indicating the patient was discharged to home on 6/15/21 with [facility]  A email was sent to the facility requesting discharge instructions  When CM assistant has received the Discharge paperwork CM assistant will attach to this episode  I have removed myself off of the care team, added the CM to the care team who will follow the patient through the bundle episode, sent the care manager a inbasket notifying them of the bundle episode, updated the BPCI form, and updated the care coordination note

## 2021-06-16 ENCOUNTER — PATIENT OUTREACH (OUTPATIENT)
Dept: INTERNAL MEDICINE CLINIC | Facility: CLINIC | Age: 76
End: 2021-06-16

## 2021-06-16 ENCOUNTER — DOCUMENTATION (OUTPATIENT)
Dept: SOCIAL WORK | Facility: HOSPITAL | Age: 76
End: 2021-06-16

## 2021-06-16 NOTE — PROGRESS NOTES
Admission Report at [facility]  [facility] has admitted your patient to [address] [place] [address] service with the following disciplines:      Primary focus of home health care  Sp rotator cuff surgery  Patient stated goals of care to get better  Anticipated visit pattern and next visit date soc completed on 6 16 2021 plan to see pt 1 w 1   2 x 4  1 x 3  Significant clinical findings incision with steri strips intact  No s or s of infection at this time  Request for additional disciplines PT OT ordered on soc  Needs follow up physician appointments scheduled  To schedule with pcp  8 19 with ortho set up  Potential barriers to goal achievement adherence to plan of care  Other pertinent information lives with supportive sister  Using quad cane to ambulate and has bedside commode  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

## 2021-06-16 NOTE — PROGRESS NOTES
Outpatient Care Management Note:    Patient was at [address] [place] [address] from 6/1-6/3/21 and underwent a left reverse total shoulder arthroplasty  Patient was discharged to [facility] for short term rehab  Patient followed up with [facility] office on 6/14/21 and was discharged home on 6/15/21 from rehab  Patient is in a bundle episode (BPCI) from 6/1-8/31/21  I called and spoke with patient  I explained my role and reason for call  She reports she is doing well  She reports in rehab towards the end of her stay there she was urinating more frequently  Patient reports she did receive a call that she has bacteria in her urine and has a UTI and that the doctor at rehab is sending a script for an antibiotic to her pharmacy for her to take  I encouraged patient to complete antibiotic and if she is still having symptoms after she completes antibiotic to call PCP office to further discuss  Patient reports understanding  Patient reports she is getting around and has assistance from her sister who she lives with and her niece  Patient reports her granddaughter will take her to appointments  Patient reports needs assistance with ADL's at this time and is currently wearing a sling at this time per potocol  Patient reports was instructed to wear it the next 2 weeks and will then be set up for in home PT by ortho office she reports  Patient has follow up with ortho office in August  Patient reports she will plan to follow up and schedule appointment with hand specialist in near future but is waiting for her shoulder to heal more  Patient reports she is taking Tylenol and Ibuprofen as needed for pain and is taking Gabapentin three times a day  Patient confirms she has all her other medication at this time and taking as prescribed  Patient denies any issues with constipation at this time and has stool softeners to take if needed       Patient encouraged to schedule routine follow up with PCP in near future  Patient is agreeable for outreach in about 2 weeks  Patient does not have any further questions, concerns, or other needs at this time  Pt has my contact # and PCP office # if needed  Pt is agreeable for further outreach

## 2021-06-17 NOTE — PROGRESS NOTES
Discharge paperwork from [facility] is now scanned into chart under media for review

## 2021-06-18 ENCOUNTER — TELEPHONE (OUTPATIENT)
Dept: OBGYN CLINIC | Facility: HOSPITAL | Age: 76
End: 2021-06-18

## 2021-06-18 NOTE — TELEPHONE ENCOUNTER
Patient sees [facility] called and is inquiring about patient starting PT ? The patient stated that she isn't supposed to start PT yet      Please Advise  HORACIO - [phone] - Shanique Lovelace

## 2021-06-18 NOTE — TELEPHONE ENCOUNTER
I called following day and spoke with the granddaughter as discussed    Start PT anytime  - I told her just to start next week

## 2021-06-18 NOTE — TELEPHONE ENCOUNTER
Per Shania's note dated 6/14 she was going ot touch base with patient the following day but there is no note advising what patient is to do  Please advise

## 2021-06-22 ENCOUNTER — OFFICE VISIT (OUTPATIENT)
Dept: INTERNAL MEDICINE CLINIC | Facility: CLINIC | Age: 76
End: 2021-06-22

## 2021-06-22 VITALS — DIASTOLIC BLOOD PRESSURE: 71 MMHG | TEMPERATURE: 97.9 F | SYSTOLIC BLOOD PRESSURE: 122 MMHG | HEART RATE: 76 BPM

## 2021-06-22 DIAGNOSIS — S43.002A ACQUIRED SUBLUXATION OF LEFT SHOULDER, INITIAL ENCOUNTER: ICD-10-CM

## 2021-06-22 DIAGNOSIS — G56.01 CARPAL TUNNEL SYNDROME OF RIGHT WRIST: Primary | ICD-10-CM

## 2021-06-22 DIAGNOSIS — Z97.5 IUD (INTRAUTERINE DEVICE) IN PLACE: ICD-10-CM

## 2021-06-22 DIAGNOSIS — Z96.612 S/P REVERSE TOTAL SHOULDER ARTHROPLASTY, LEFT: ICD-10-CM

## 2021-06-22 PROCEDURE — 99496 TRANSJ CARE MGMT HIGH F2F 7D: CPT | Performed by: INTERNAL MEDICINE

## 2021-06-22 NOTE — PATIENT INSTRUCTIONS
Cirugía del juana greene   CUIDADO AMBULATORIO:   Lo que usted necesita saber acerca de la cirugía del juana greene: La cirugía del juana greene, o descompresión, se [place] para quitar la presión del nervio mediano de bejarano roxie  El nervio mediano controla los músculos y la sensación en la [place]  La cirugía podría realizarse a través de emmanuel abertura en bejarano mars  A esto se le llama cirugía abierta  O en bejarano lugar, el cirujano podría colocar un endoscopio e instrumentos en 1 o 2 incisiones de bejaarno roxie o de bejarano mars  A esto se le conoce hussein cirugía endoscópica  La forma para prepararse para la cirugía: Bejarano médico le indicará cómo prepararse para la [facility]  Le puede indicar que no consuma ningún alimento ni bebida después de la medianoche del día de la [facility]  Avani Browneian cuáles medicamentos blayne y cuáles no blayne en el día de bejarano cirugía  Carlos arreglos para que alguien lo lleve a bejarano casa después de la [facility]  Es posible que le administren antibióticos antes de la cirugía para evitar emmanuel infección  Informe a bejarano médico si usted alguna vez tuvo emmanuel reacción alérgica a los antibióticos  Qué sucederá micky la cirugía:  · Es posible que le administren anestesia local o regional para ayudarlo a evitar el dolor micky la [facility]  La anestesia local entumecerá solamente bejarano roxie  La anestesia regional entumecerá bejarano Samanta So y Devoria Hotter  Es posible que en cambio le administren anestesia general para mantenerlo dormido y sin dolor  Usted podría necesitar esta anestesia si bejarano cirujano piensa que la [facility] va a durar mucho tiempo o si involucra emmanuel parte nella de bejarano [place] [address]  · Para emmanuel cirugía abierta, bejarano cirujano hará emmanuel incisión en la mars de bejarano mano  La incisión podría extenderse hacia bejarano roxie  Se cortará un ligamento para liberar la presión del nervio  Cyn ligamento es emmanuel escobar de tejido que conecta a las articulaciones de bejarano roxie   Bejarano cirujano también podría quitar el tejido cicatricial o cualquier cosa que pudiera estar presionando el nervio  · Para emmanuel cirugía endoscópica, manzano cirujano hará 1 o 2 incisiones en manzano roxie o mars  Él introducirá el endoscopio con la cámara a través de emmanuel incisión para ayudar a guiarlo micky la cirugía  Se podrían usar instrumentos en manzano roxie para ayudar a [employer] nervios  Después él cortará el ligamento que está presionando el nervio  · La incisión será cerrada con puntos de sutura y Rescue con vendajes  Qué sucederá después de la cirugía: Es posible que le coloquen emmanuel férula en manzano roxie para evitar que se [place]  A usted lo llevarán a emmanuel habitación donde descansará hasta que esté completamente despierto y tenga sensibilidad en manzano Doyne Carp  Manzano médico podría pedirle que mueva deb dedos poco después de la [place]  No  intente levantarse de la cama hasta que manzano médico se lo autorice  [place] de [place] del túnel carpiano: Usted podría sangrar más de lo esperado o contraer emmanuel infección  Manzano piel podría presentar moretones  Podría formarse emmanuel cicatriz gruesa y dolorosa en donde le hicieron la cirugía  Usted podría desarrollar dedo en gatillo (dedos bloqueados en posición doblada)  La cirugía podría provocar entumecimiento o debilidad a wyatt plazo en deb dedos, mano o roxie  Deb síntomas podrían no desaparecer, y usted podría necesitar cirugía nuevamente  Busque atención médica de inmediato si:  · Se desprenden los puntos de sutura  · La gelacio empapa el vendaje  · Usted no puede sentir o  deb piper o dedos  · Usted siente un bulto o inflamación en manzano roxie  Comuníquese con manzano médico o especialista de la mano si:  · Usted tiene fiebre o escalofríos  · Usted se siente débil o dolorido  · Usted tiene dolor, aun después de blayne [place]  · Usted tiene inflamación, rigidez o entumecimiento en los dedos de las piper  · Los dedos de deb piper se quedan rígidos en la misma posición      · Usted tiene preguntas o inquietudes acerca de manzano condición o cuidado  Medicamentos: Es posible que usted necesite alguno de los siguientes:  · Los antibióticos sirven para prevenir o combatir la infección bacteriana  · Puede administrarse podrían administrarse  Pregunte cómo blayne estos medicamentos de emmanuel forma san  · [place] dmitry medicamentos hussein se le haya indicado  Consulte con manzano médico si usted leticia que manzano medicamento no le está ayudando o si presenta efectos secundarios  Infórmele si es alérgico a cualquier medicamento  Mantenga emmanuel lista actualizada de los [place], las vitaminas y los productos herbales que ino  Incluya los siguientes datos de los medicamentos: cantidad, frecuencia y motivo de administración  Traiga con usted la lista o los envases de las píldoras a dmitry citas de seguimiento  Lleve la lista de los medicamentos con usted en ирина de emmanuel emergencia  Acuda a terapia física u ocupacional, si se lo indican: Un fisioterapeuta puede enseñarle ejercicios para ayudar a mejorar el movimiento y la fuerza  La fisioterapia también puede ayudar a disminuir el dolor o pérdida de función  Un terapeuta ocupacional puede ayudarlo a encontrar maneras de hacer trabajo y Taylor Alicia actividades para reducir la tensión en manzano roxie  Aplique hielo en manzano roxie: El hielo ayuda a disminuir la inflamación y el dolor  El hielo también puede contribuir a evitar el daño de los tejidos  Use emmanuel compresa de hielo o ponga hielo triturado en emmanuel bolsa de plástico  [place] el paquete o bolsa con hielo con emmanuel toalla  Colóquela en manzano roxie por 15 a 20 minutos cada hora, o hussein se le indique  Limite dmitry actividades según le indicaron: No jale, levante ni mueva objetos pesados hasta que manzano médico le indique que puede [place]  Pregunte cuándo puede regresar al Elba Adilson  Tómese tiempo para descansar manzano mano  Si usted trabaja con emmanuel computadora, descanse dmitry piper con frecuencia  Es posible que usted necesite elevar manzano brazo varias veces al día   [place] ayuda a disminuir el dolor y la inflamación  Programe emmanuel brittany con manzano médico o especialista de la mano hussein se le indique: Es posible que usted necesite regresar para que le quiten los puntos de [place]  Pregunte por cuánto tiempo usted necesita usar la férula  Anote dmitry preguntas para que se acuerde de hacerlas micky dmitry visitas  © Copyright [address] Information is for End User's use only and may not be sold, redistributed or otherwise used for commercial purposes  All illustrations and images included in CareNotes® are the copyrighted property of [employer]  or [address] [place] [address] es sólo para uso en educación  Manzano intención no es darle un consejo médico sobre enfermedades o tratamientos  Colsulte con manzano Puma Pepe farmacéutico antes de seguir cualquier régimen médico para saber si es seguro y efectivo para usted  Carpal Tunnel Syndrome   WHAT YOU SHOULD KNOW:   Carpal tunnel syndrome (CTS) is a condition where there is increased pressure on the median nerve in the wrist  The median nerve controls muscles and feeling in the hand  Pressure may come from overuse and swelling of ligaments in the wrist    INSTRUCTIONS:   Medicines:   · NSAIDs:  These medicines decrease swelling and pain  NSAIDs are available without a doctor's order  Ask your primary healthcare provider which medicine is right for you and how much to take  Take as directed  NSAIDs can cause stomach bleeding or kidney problems if not taken correctly  · Take your medicine as directed  Call your healthcare provider if you think your medicine is not helping or if you have side effects  Tell him if you are allergic to any medicine  Keep a list of the medicines, vitamins, and herbs you take  Include the amounts, and when and why you take them  Bring the list or the pill bottles to follow-up visits  Carry your medicine list with you in case of an emergency    Follow up with your healthcare provider as directed: Write down your questions so you remember to ask them during your visits  Manage your symptoms:   · Use ice:  Ice helps decrease swelling and pain in your wrist  Ice may also help prevent tissue damage  Use an ice pack or put crushed ice in a plastic bag  Cover the ice pack with a towel and place it on your wrist for 15 to 20 minutes every hour  · Get physical and occupational therapy:  Physical therapists will show you ways to exercise and strengthen your wrist  Occupational therapists will show you safe ways to use your wrist while you do your usual activities  · Rest your hands:  Let your hands rest for a short time between repetitive motions, such as typing  If you feel pain, stop what you are doing and gently massage your wrist and hand  · Use a wrist splint: This keeps your wrist straight or in a slightly bent position  A wrist splint decreases pressure on the median nerve by letting your wrist rest  You may need to wear the splint for up to 8 weeks  You may need to wear your wrist splint at night  · Evaluate your work habits:  Ask about ways to modify your work to help decrease your symptoms  Contact your primary healthcare provider if:   · Your symptoms are worse than before  · You have questions or concerns about your condition or care  Return to the emergency department if:   · You suddenly lose feeling and cannot move your hand  · Your hand suddenly changes color  © 2014 [address] is for End User's use only and may not be sold, redistributed or otherwise used for commercial purposes  All illustrations and images included in CareNotes® are the copyrighted property of [employer]  or Natan Box  The above information is an  only  It is not intended as medical advice for individual conditions or treatments   Talk to your doctor, nurse or pharmacist before following any medical regimen to see if it is safe and effective for you

## 2021-06-23 ENCOUNTER — TELEPHONE (OUTPATIENT)
Dept: OBGYN CLINIC | Facility: OTHER | Age: 76
End: 2021-06-23

## 2021-06-23 NOTE — TELEPHONE ENCOUNTER
Slim Gimenez from [facility] called in requesting instructions in how to treat the patient         Please advise,     Reagan#: [phone]

## 2021-06-23 NOTE — TELEPHONE ENCOUNTER
Vague message  LMOM  Not sure what she needs  Shoulder surgery was weeks ago  Sling is due to be d/c soon  Therapy has the protocol on how to rehab  She doesn't need wound care  Beyond that, medically she can reach out to PCP with any concerns

## 2021-06-28 ENCOUNTER — TELEPHONE (OUTPATIENT)
Dept: INTERNAL MEDICINE CLINIC | Facility: CLINIC | Age: 76
End: 2021-06-28

## 2021-06-28 NOTE — TELEPHONE ENCOUNTER
Patient called stating she is still having numbness in hands and wants to pursue getting injections in her hand  She discussed this at her 6/22/21 appointment  She said the splint is not helping  Can you please put in order for the Orthopedic? Also she is saying she is still peeing a lot  She said [employer] gave her an  antibiotic  It was Cipro 500mg  She took her last one last week  She said Ron Parrish had her on antibiotics as well previously  She wants to know what Marly Garcia thinks she should do? Please advise and call patient

## 2021-06-29 ENCOUNTER — TELEPHONE (OUTPATIENT)
Dept: OBGYN CLINIC | Facility: MEDICAL CENTER | Age: 76
End: 2021-06-29

## 2021-06-29 NOTE — TELEPHONE ENCOUNTER
I called Kadie Heath but she states it would be better if she could speak directly to PA due to amount of  protocol questions

## 2021-06-29 NOTE — TELEPHONE ENCOUNTER
Patient sees Dr Karel Mota at [address] [facility] calling with concerns that the patient has been doing weight bearing for weeks with her lt shoulder  She discharge the sling about 2 days, she is complaining about pain that wont go away, 7-8, 30-40 degreeof passive shoulder flexion before it is too painful, has not been using ice  Please give her  a call at [phone], she has questions on her protocol  Rosa Johnson

## 2021-06-30 NOTE — PLAN OF CARE
Problem: Potential for Falls  Goal: Patient will remain free of falls  Description  INTERVENTIONS:  - Assess patient frequently for physical needs  -  Identify cognitive and physical deficits and behaviors that affect risk of falls  -  [place] fall precautions as indicated by assessment   - Educate patient/family on patient safety including physical limitations  - Instruct patient to call for assistance with activity based on assessment  - Modify environment to reduce risk of injury  - Consider OT/PT consult to assist with strengthening/mobility  Outcome: Progressing     Problem: Prexisting or High Potential for Compromised Skin Integrity  Goal: Skin integrity is maintained or improved  Description  INTERVENTIONS:  - Identify patients at risk for skin breakdown  - Assess and monitor skin integrity  - Assess and monitor nutrition and hydration status  - Monitor labs   - Assess for incontinence   - Turn and reposition patient  - Assist with mobility/ambulation  - Relieve pressure over bony prominences  - Avoid friction and shearing  - Provide appropriate hygiene as needed including keeping skin clean and dry  - Evaluate need for skin moisturizer/barrier cream  - Collaborate with interdisciplinary team   - Patient/family teaching  - Consider wound care consult   Outcome: Progressing     Problem: Nutrition/Hydration-ADULT  Goal: Nutrient/Hydration intake appropriate for improving, restoring or maintaining nutritional needs  Description  Monitor and assess patient's nutrition/hydration status for malnutrition  Collaborate with interdisciplinary team and initiate plan and interventions as ordered  Monitor patient's weight and dietary intake as ordered or per policy  Utilize nutrition screening tool and intervene as necessary  Determine patient's food preferences and provide high-protein, high-caloric foods as appropriate       INTERVENTIONS:  - Monitor oral intake, urinary output, labs, and treatment plans  - Assess nutrition and hydration status and recommend course of action  - Evaluate amount of meals eaten  - Assist patient with eating if necessary   - Allow adequate time for meals  - Recommend/ encourage appropriate diets, oral nutritional supplements, and vitamin/mineral supplements  - Order, calculate, and assess calorie counts as needed  - Recommend, monitor, and adjust tube feedings and TPN/PPN based on assessed needs  - Assess need for intravenous fluids  - Provide specific nutrition/hydration education as appropriate  - Include patient/family/caregiver in decisions related to nutrition  Outcome: Progressing     Problem: PAIN - ADULT  Goal: Verbalizes/displays adequate comfort level or baseline comfort level  Description  Interventions:  - Encourage patient to monitor pain and request assistance  - Assess pain using appropriate pain scale  - Administer analgesics based on type and severity of pain and evaluate response  - Implement non-pharmacological measures as appropriate and evaluate response  - Consider cultural and social influences on pain and pain management  - Notify physician/advanced practitioner if interventions unsuccessful or patient reports new pain  Outcome: Progressing     Problem: INFECTION - ADULT  Goal: Absence or prevention of progression during hospitalization  Description  INTERVENTIONS:  - Assess and monitor for signs and symptoms of infection  - Monitor lab/diagnostic results  - Monitor all insertion sites, i e  indwelling lines, tubes, and drains  - Monitor endotracheal if appropriate and nasal secretions for changes in amount and color  - [place] appropriate cooling/warming therapies per order  - Administer medications as ordered  - Instruct and encourage patient and family to use good hand hygiene technique  - Identify and instruct in appropriate isolation precautions for identified infection/condition  Outcome: Progressing  Goal: Absence of fever/infection during neutropenic period  Description  INTERVENTIONS:  - Monitor WBC    Outcome: Progressing     Problem: SAFETY ADULT  Goal: Maintain or return to baseline ADL function  Description  INTERVENTIONS:  -  Assess patient's ability to carry out ADLs; assess patient's baseline for ADL function and identify physical deficits which impact ability to perform ADLs (bathing, care of mouth/teeth, toileting, grooming, dressing, etc )  - Assess/evaluate cause of self-care deficits   - Assess range of motion  - Assess patient's mobility; develop plan if impaired  - Assess patient's need for assistive devices and provide as appropriate  - Encourage maximum independence but intervene and supervise when necessary  - Involve family in performance of ADLs  - Assess for home care needs following discharge   - Consider OT consult to assist with ADL evaluation and planning for discharge  - Provide patient education as appropriate  Outcome: Progressing  Goal: Maintain or return mobility status to optimal level  Description  INTERVENTIONS:  - Assess patient's baseline mobility status (ambulation, transfers, stairs, etc )    - Identify cognitive and physical deficits and behaviors that affect mobility  - Identify mobility aids required to assist with transfers and/or ambulation (gait belt, sit-to-stand, lift, walker, cane, etc )  - [place] fall precautions as indicated by assessment  - Record patient progress and toleration of activity level on Mobility SBAR; progress patient to next Phase/Stage  - Instruct patient to call for assistance with activity based on assessment  - Consider rehabilitation consult to assist with strengthening/weightbearing, etc   Outcome: Progressing     Problem: DISCHARGE PLANNING  Goal: Discharge to home or other facility with appropriate resources  Description  INTERVENTIONS:  - Identify barriers to discharge w/patient and caregiver  - Arrange for needed discharge resources and transportation as appropriate  - Identify discharge learning needs (meds, wound care, etc )  - Arrange for interpretive services to assist at discharge as needed  - Refer to Case Management Department for coordinating discharge planning if the patient needs post-hospital services based on physician/advanced practitioner order or complex needs related to functional status, cognitive ability, or social support system  Outcome: Progressing     Problem: Knowledge Deficit  Goal: Patient/family/caregiver demonstrates understanding of disease process, treatment plan, medications, and discharge instructions  Description  Complete learning assessment and assess knowledge base    Interventions:  - Provide teaching at level of understanding  - Provide teaching via preferred learning methods  Outcome: Progressing None

## 2021-07-01 ENCOUNTER — TELEPHONE (OUTPATIENT)
Dept: INTERNAL MEDICINE CLINIC | Facility: CLINIC | Age: 76
End: 2021-07-01

## 2021-07-01 NOTE — TELEPHONE ENCOUNTER
Sidney Oreilly from [facility] ([facility] called office to report patient has new +2 pitting edema to bilateral feet, ankles, and intermediate up legs  She reports it has been worsening thi past week  She states patient reports legs feel heavy  Sidney Oreilly encouraged patient to keep legs elevated when resting since patient has been keeping them on the floor  Sidney Oreilly would like to know if you want patient to be on a short term diuretic, as patient had been on one in the past with the same issue  We may call [place] to inform her at number listed in message

## 2021-07-02 DIAGNOSIS — M79.89 SWELLING OF LOWER EXTREMITY: Primary | ICD-10-CM

## 2021-07-02 RX ORDER — FUROSEMIDE 20 MG/1
20 TABLET ORAL DAILY
Qty: 5 TABLET | Refills: 0 | Status: SHIPPED | OUTPATIENT
Start: 2021-07-02 | End: 2021-07-20 | Stop reason: SDUPTHER

## 2021-07-02 NOTE — TELEPHONE ENCOUNTER
Called and spoke to [employer] [facility]  She states patient denies chest pain, chest tightness, or shortness of breath  She was ambulating short distance with the physical therapist yesterday and her lung sounds were clear  Please see below  Curt Cisneros would like us to call her with any medication changes

## 2021-07-02 NOTE — TELEPHONE ENCOUNTER
Called and made Levy Munoz RN aware of starting 5 days of Lasix  Called patient and made her aware as well  I attempted to schedule patient for an appointment, but she said she would need to call back because she needs to make sure she has a ride to her appointment  I informed patient how important it is that she come in to follow up on th swelling to make sure the medication is working and to confirm if she needs additional testing  Patient verbally understood and will call patient back  Please reach out to patient to schedule if she does not return call by next Tuesday 7/6/21

## 2021-07-06 ENCOUNTER — APPOINTMENT (EMERGENCY)
Dept: RADIOLOGY | Facility: HOSPITAL | Age: 76
End: 2021-07-06
Payer: MEDICARE

## 2021-07-06 ENCOUNTER — HOSPITAL ENCOUNTER (EMERGENCY)
Facility: HOSPITAL | Age: 76
Discharge: HOME | End: 2021-07-06
Attending: EMERGENCY MEDICINE
Payer: MEDICARE

## 2021-07-06 ENCOUNTER — TELEPHONE (OUTPATIENT)
Dept: INTERNAL MEDICINE CLINIC | Facility: CLINIC | Age: 76
End: 2021-07-06

## 2021-07-06 VITALS
HEART RATE: 76 BPM | HEIGHT: 58 IN | RESPIRATION RATE: 16 BRPM | WEIGHT: 99 LBS | SYSTOLIC BLOOD PRESSURE: 112 MMHG | OXYGEN SATURATION: 100 % | BODY MASS INDEX: 20.78 KG/M2 | DIASTOLIC BLOOD PRESSURE: 70 MMHG | TEMPERATURE: 97.9 F

## 2021-07-06 DIAGNOSIS — S49.91XA INJURY OF RIGHT SHOULDER, INITIAL ENCOUNTER: Primary | ICD-10-CM

## 2021-07-06 DIAGNOSIS — S00.03XA TRAUMATIC INJURY OF HEAD WITH HEMATOMA OF SCALP: ICD-10-CM

## 2021-07-06 DIAGNOSIS — S09.90XA INJURY OF HEAD, INITIAL ENCOUNTER: ICD-10-CM

## 2021-07-06 DIAGNOSIS — S09.90XA TRAUMATIC INJURY OF HEAD WITH HEMATOMA OF SCALP: ICD-10-CM
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
## 2021-07-06 PROCEDURE — 99284 EMERGENCY DEPT VISIT MOD MDM: CPT

## 2021-07-06 PROCEDURE — 99284 EMERGENCY DEPT VISIT MOD MDM: CPT | Performed by: EMERGENCY MEDICINE

## 2021-07-06 PROCEDURE — 70450 CT HEAD/BRAIN W/O DYE: CPT

## 2021-07-06 PROCEDURE — G1004 CDSM NDSC: HCPCS

## 2021-07-06 PROCEDURE — 73200 CT UPPER EXTREMITY W/O DYE: CPT

## 2021-07-06 PROCEDURE — 71045 X-RAY EXAM CHEST 1 VIEW: CPT

## 2021-07-06 PROCEDURE — 73030 X-RAY EXAM OF SHOULDER: CPT

## 2021-07-06 PROCEDURE — 72125 CT NECK SPINE W/O DYE: CPT

## 2021-07-06 RX ORDER — ACETAMINOPHEN 325 MG/1
975 TABLET ORAL ONCE
Status: COMPLETED | OUTPATIENT
Start: 2021-07-06 | End: 2021-07-06

## 2021-07-06 RX ADMIN — ACETAMINOPHEN 975 MG: 325 TABLET, FILM COATED ORAL at 19:05

## 2021-07-06 NOTE — TELEPHONE ENCOUNTER
Ekaterina Dear   [phone] visited the patient 7/6/21 1pm and called to state that the patient's vitals were fine and found that patient's right shoulder was dislocated (no pain), 8-10 level of pain at left shoulder; patient indicated she hit her head; left dorsal elbow had an abrasion      Daughter to call the ambulance to take her to the hospital

## 2021-07-06 NOTE — ED PROVIDER NOTES
History  Chief Complaint   Patient presents with    Fall     Patient reports that she fell backwards today outside and struck her head; denies thinners or LOC at this time     HPI  67yo year old female past medical history lupus presents after fall BIBA  The patient does not take any blood thinning medication  [place] she had a mechanical fall after she tripped over a step fell backwards and hit the back of her head  Patient admits to pain over the right posterior aspect her head where she struck her head on the ground  She denies any neck pain, loss consciousness nausea, vomiting, back pain, hip pain, any prodrome such as shortness of breath or chest pain  Patient was able to walk after she had fallen down  Prior to Admission Medications   Prescriptions Last Dose Informant Patient Reported? Taking? Multiple Vitamins-Minerals (CENTRUM SILVER PO)  Self Yes Yes   Sig: Take 1 tablet by mouth daily   Psyllium (METAMUCIL FIBER PO)  Self Yes Yes   Sig: Take 1 packet by mouth 3 (three) times a day     Vitamin E 400 units TABS  Self Yes Yes   Sig: Take 400 Units by mouth daily     docusate sodium (COLACE) 100 mg capsule   No Yes   Sig: Take 1 capsule (100 mg total) by mouth 2 (two) times a day   furosemide (LASIX) 20 mg tablet   No Yes   Sig: Take 1 tablet (20 mg total) by mouth daily for 5 days   gabapentin (NEURONTIN) 300 mg capsule   No Yes   Sig: TAKE ONE CAPSULE BY MOUTH THREE TIMES A DAY   hydroxychloroquine (PLAQUENIL) 200 mg tablet  Self Yes Yes   Si pill am 1/2 pill pm   levothyroxine 25 mcg tablet   No Yes   Sig: TAKE 1 TABLET BY MOUTH EVERY DAY   methotrexate 2 5 MG tablet  Self No Yes   Sig: 3 tablets by mouth once a week on Thursday   nortriptyline (PAMELOR) 50 mg capsule   No Yes   Sig: TAKE 1 CAPSULE BY MOUTH AT BEDTIME   oxyCODONE (ROXICODONE) 5 mg immediate release tablet   No Yes   Si tablets every 6 hours as needed for severe shoulder pain only     predniSONE 5 mg tablet  Self Yes Yes   Sig: Take 5 mg by mouth daily      Facility-Administered Medications: None       Past Medical History:   Diagnosis Date    Acute blood loss anemia 9/9/2020    Aftercare following joint replacement 9/9/2020    Patient had right reversed total shoulder arthroplasty   Pain was controlled when he left the hospital       Anxiety     Arthritis     Depression     Disease of thyroid gland     HYPO    Femur neck fracture (HCC)     GERD (gastroesophageal reflux disease)     Hyperthyroidism     RESOLVED: [id number]    Osteoporosis     Polio     PVD [facility]peripheral vascular disease) ([facility] [address] )     Right BBB/left ant fasc block     UTI (urinary tract infection)     Varicella infection     LAST ASSESSED: [id number]       Past Surgical History:   Procedure Laterality Date    APPENDECTOMY      HIP ARTHROPLASTY Left 11/27/2017    Procedure: REMOVAL IM NAIL CONVERSION TO TOTAL HIP ARTHROPLASTY POSTERIOR APPROACH;  Surgeon: Zachery Dorman MD;  [facility];  Service: Orthopedics    HIP FRACTURE SURGERY      HIP SURGERY      both hips replaced;2013 left ,2014 right    JOINT REPLACEMENT Right     HIP TOTAL    NH CONV PREV HIP SURG TO TOT HIP Espiridion Mitts Left 10/4/2017    Procedure: Coral Severin removal of left hip;  Surgeon: Zachery Dorman MD;  [facility];  Service: Orthopedics    NH RECONSTR TOTAL SHOULDER IMPLANT Right 9/2/2020    Procedure: ARTHROPLASTY SHOULDER REVERSE;  Surgeon: Razia Koenig MD;  [facility];  Service: Orthopedics    NH RECONSTR TOTAL SHOULDER IMPLANT Left 6/1/2021    Procedure: ARTHROPLASTY SHOULDER REVERSE;  Surgeon: Razia Koenig MD;  [facility];  Service: Orthopedics    REVISION TOTAL HIP ARTHROPLASTY Right     TONSILLECTOMY         Family History   Problem Relation Age of Onset    Rheum arthritis Mother     Rheum arthritis Sister     Prostate cancer Brother      I have reviewed and agree with the history as documented  E-Cigarette/Vaping    E-Cigarette Use Never User      E-Cigarette/Vaping Substances    Nicotine No     THC No     CBD No     Flavoring No     Other No     Unknown No      Social History     Tobacco Use    Smoking status: Former Smoker    Smokeless tobacco: Never Used    Tobacco comment: quit 20-30 years ago   Vaping Use    Vaping Use: Never used   Substance Use Topics    Alcohol use: Not Currently    Drug use: Not Currently        Review of Systems   Constitutional: Negative for chills and fever  HENT: Negative for congestion, ear pain, rhinorrhea and sore throat  Eyes: Negative for pain  Respiratory: Negative for apnea, cough, choking, chest tightness, shortness of breath, wheezing and stridor  Cardiovascular: Negative for chest pain, palpitations and leg swelling  Gastrointestinal: Negative for abdominal pain, constipation, diarrhea, nausea and vomiting  Genitourinary: Negative for hematuria  Musculoskeletal: Negative for arthralgias and back pain  Skin: Negative for rash and wound  Neurological: Negative for dizziness  Psychiatric/Behavioral: Negative for agitation and hallucinations  All other systems reviewed and are negative  Physical Exam  ED Triage Vitals [07/06/21 1419]   Temperature Pulse Respirations Blood Pressure SpO2   97 9 °F (36 6 °C) 84 18 95/60 98 %      Temp Source Heart Rate Source Patient Position - Orthostatic VS BP Location FiO2 (%)   Oral Monitor Sitting Left arm --      Pain Score       5             Orthostatic Vital Signs  Vitals:    07/06/21 1600 07/06/21 1700 07/06/21 1800 07/06/21 1900   BP: 107/67 108/64 110/67 112/70   Pulse: 80 79 74 76   Patient Position - Orthostatic VS: Lying Lying Lying Lying       Physical Exam  Vitals reviewed  Constitutional:       General: She is not in acute distress  Appearance: She is well-developed  HENT:      Head: Normocephalic        Comments: Hematoma over the right posterior parietal area     Right Ear: External ear normal       Left Ear: External ear normal       Nose: Nose normal       Mouth/Throat:      Mouth: Mucous membranes are moist    Eyes:      Extraocular Movements: Extraocular movements intact  Conjunctiva/sclera: Conjunctivae normal       Pupils: Pupils are equal, round, and reactive to light  Cardiovascular:      Rate and Rhythm: Normal rate and regular rhythm  Heart sounds: Normal heart sounds  Pulmonary:      Effort: Pulmonary effort is normal  No respiratory distress  Breath sounds: Normal breath sounds  No wheezing or rales  Abdominal:      Palpations: Abdomen is soft  Tenderness: There is no abdominal tenderness  Musculoskeletal:         General: No tenderness  Cervical back: Normal range of motion and neck supple  No rigidity  Comments: Right shoulder seems to exhibit deformity compared to left     +2 pulses radial pulses b/l   Gross sensation intact in upper and lower extremities b/l    no tenderness to palpation of the cervical thoracic or lumbar spines   Skin:     General: Skin is warm  Neurological:      General: No focal deficit present  Mental Status: She is alert and oriented to person, place, and time  Cranial Nerves: No cranial nerve deficit  Sensory: No sensory deficit  Motor: No weakness  Psychiatric:         Mood and Affect: Mood normal          ED Medications  Medications   acetaminophen (TYLENOL) tablet 975 mg (975 mg Oral Given 7/6/21 1905)       Diagnostic Studies  Results Reviewed     None                 CT upper extremity wo contrast right   Final Result by Bonnie Yoder MD (07/06 1940)      There is an acute, displaced acromial process fracture  The distal fracture fragment is displaced inferiorly by 1 4 cm (series 401 images 24-38 and series 400 images 29-37 )      Intact reverse shoulder arthroplasty           Workstation performed: [id number]         XR shoulder 2+ views RIGHT   Final Result by Lizz Streeter MD (07/06 1940)      There is a reverse right shoulder arthroplasty  There is an acute, displaced acromial process fracture, for which also please refer to subsequent right shoulder CT  Workstation performed: [id number]         XR chest 1 view portable   Final Result by Lizz Streeter MD (07/06 1858)      No acute cardiopulmonary disease  Please refer to right shoulder plain film and CT for evaluation of traumatic injury in that region  Workstation performed: [id number]         CT head without contrast   Final Result by Bre Piña MD (07/06 1612)      No acute intracranial abnormality  Workstation performed: [id number]         CT cervical spine without contrast   Final Result by Bre Piña MD (07/06 1611)      No cervical spine fracture or traumatic malalignment  Workstation performed: [id number]               Procedures  Procedures      ED Course               Identification of Seniors at Risk      Most Recent Value   (ISAR) Identification of Seniors at Risk   Before the illness or injury that brought you to the Emergency, did you need someone to help you on a regular basis? 0 Filed at: 07/06/2021 1421   In the last 24 hours, have you needed more help than usual?  0 Filed at: 07/06/2021 1421   Have you been hospitalized for one or more nights during the past 6 months? 0 Filed at: 07/06/2021 1421   In general, do you see well?  0 Filed at: 07/06/2021 1421   In general, do you have serious problems with your memory? 0 Filed at: 07/06/2021 1421   Do you take more than three different medications every day? 1 Filed at: 07/06/2021 1421   ISAR Score  1 Filed at: 07/06/2021 1421                    SBIRT 20yo+      Most Recent Value   SBIRT (23 yo +)   In order to provide better care to our patients, we are screening all of our patients for alcohol and drug use  Would it be okay to ask you these screening questions?   No Filed at: 07/06/2021 1421                [facility]  Number of Diagnoses or Management Options  Fall, initial encounter  Injury of head, initial encounter  Injury of right shoulder, initial encounter  Traumatic injury of head with hematoma of scalp  Diagnosis management comments: 65yo year old female past medical history lupus presents after fall BIBA  DX:  Minor head trauma, scalp hematoma skull fracture, neck fracture, dislocated shoulder, doubt intracranial hemorrhage    CT head and neck does not show any fracture  No intracranial hemorrhage    X-ray of right shoulder was performed which showed intact arthroplasty however it was unclear if scapula was intact  CT shoulder was performed and did not show any fracture dislocation  Orthopedics was consulted and they reviewed the patient's x-rays stating that her shoulder x-ray on today's visit resembles her shoulder x-ray at a previous visit without any evidence of dislocation  Patient's pain was well controlled with Tylenol  Patient was discharged with return precautions for head injury  Disposition  Final diagnoses:   Injury of right shoulder, initial encounter   Fall, initial encounter   Injury of head, initial encounter   Traumatic injury of head with hematoma of scalp     Time reflects when diagnosis was documented in both MDM as applicable and the Disposition within this note     Time User Action Codes Description Comment    7/6/2021  4:57 PM Kaylene Fleming Add [T02 95ZE] Injury of right shoulder, initial encounter     7/6/2021  8:01 PM Ashley Luciak Add [W19  ICRZ] Fall, initial encounter     7/6/2021  8:01 PM Ashley John Add [O38 31RE] Injury of head, initial encounter     7/6/2021  8:02 PM Ashley John Add [S09 90XA,  S00 03XA] Traumatic injury of head with hematoma of scalp       ED Disposition     ED Disposition Condition Date/Time Comment    Discharge Stable Tue Jul 6, 2021  8:01 PM Geetha Rivera discharge to home/self care  Follow-up Information     Follow up With Specialties Details Why Contact Info    Page ANGELA [facility], Physician Assistant Call  As needed [phone] [facility]  [phone]            Discharge Medication List as of 7/6/2021  8:03 PM      CONTINUE these medications which have NOT CHANGED    Details   docusate sodium (COLACE) 100 mg capsule Take 1 capsule (100 mg total) by mouth 2 (two) times a day, Starting Tue 6/1/2021, Normal      furosemide (LASIX) 20 mg tablet Take 1 tablet (20 mg total) by mouth daily for 5 days, Starting Fri 7/2/2021, Until Wed 7/7/2021, Normal      gabapentin (NEURONTIN) 300 mg capsule TAKE ONE CAPSULE BY MOUTH THREE TIMES A DAY, Normal      hydroxychloroquine (PLAQUENIL) 200 mg tablet 1 pill am 1/2 pill pm, Starting Sat 4/4/2020, Historical Med      levothyroxine 25 mcg tablet TAKE 1 TABLET BY MOUTH EVERY DAY, Normal      methotrexate 2 5 MG tablet 3 tablets by mouth once a week on Thursday, No Print      Multiple Vitamins-Minerals (CENTRUM SILVER PO) Take 1 tablet by mouth daily, Starting Tue 12/13/2016, Historical Med      nortriptyline (PAMELOR) 50 mg capsule TAKE 1 CAPSULE BY MOUTH AT BEDTIME, Normal      oxyCODONE (ROXICODONE) 5 mg immediate release tablet 1 tablets every 6 hours as needed for severe shoulder pain only , Normal      predniSONE 5 mg tablet Take 5 mg by mouth daily, Starting Mon 9/28/2020, Historical Med      Psyllium (METAMUCIL FIBER PO) Take 1 packet by mouth 3 (three) times a day  , Historical Med      Vitamin E 400 units TABS Take 400 Units by mouth daily  , Starting Tue 12/13/2016, Historical Med           No discharge procedures on file  PDMP Review       Value Time User    PDMP Reviewed  Yes 6/3/2021 12:26 PM Haleigh Lyle MD           ED Provider  Attending physically available and evaluated Geetha Rivera  FREDDIE managed the patient along with the ED Attending      Electronically Signed by         Lorrie Magaña DO  07/07/21 1224

## 2021-07-06 NOTE — ED ATTENDING ATTESTATION
7/6/2021  Kathi Martel DO, saw and evaluated the patient  I have discussed the patient with the resident/non-physician practitioner and agree with the resident's/non-physician practitioner's findings, Plan of Care, and MDM as documented in the resident's/non-physician practitioner's note, except where noted  All available labs and Radiology studies were reviewed  I was present for key portions of any procedure(s) performed by the resident/non-physician practitioner and I was immediately available to provide assistance  At this point I agree with the current assessment done in the Emergency Department  I have conducted an independent evaluation of this patient a history and physical is as follows:    67 yof with mechanical fall, no LOC, no thinners    Past Medical History:   Diagnosis Date    Acute blood loss anemia 9/9/2020    Aftercare following joint replacement 9/9/2020    Patient had right reversed total shoulder arthroplasty   Pain was controlled when he left the hospital       Anxiety     Arthritis     Depression     Disease of thyroid gland     HYPO    Femur neck fracture (HCC)     GERD (gastroesophageal reflux disease)     Hyperthyroidism     RESOLVED: [id number]    Osteoporosis     Polio     PVD [facility]peripheral vascular disease) ([facility] [address] )     Right BBB/left ant fasc block     UTI (urinary tract infection)     Varicella infection     LAST ASSESSED: [id number]       BP 95/60 (BP Location: Left arm)   Pulse 84   Temp 97 9 °F (36 6 °C) (Oral)   Resp 18   Ht 4' 10" (1 473 m)   Wt 44 9 kg (99 lb)   SpO2 98%   BMI 20 69 kg/m²   NAD, A&Ox4, parietal hematoma, face atraumatic, c-spine NT, chest/a/p/spine/pelvis/ext NT/NTOM    CT head and C-spine, ambulation trial w baseline use of walker, likely DC           ED Course         Critical Care Time  Procedures

## 2021-07-07 NOTE — DISCHARGE INSTRUCTIONS
Return to the emergency department if you experience nausea, vomiting, weakness on 1 side greater than another, or numbness and tingling in her extremities

## 2021-07-08 ENCOUNTER — PATIENT OUTREACH (OUTPATIENT)
Dept: INTERNAL MEDICINE CLINIC | Facility: CLINIC | Age: 76
End: 2021-07-08

## 2021-07-08 DIAGNOSIS — Z74.8 ASSISTANCE NEEDED WITH TRANSPORTATION: Primary | ICD-10-CM

## 2021-07-08 NOTE — PROGRESS NOTES
Outpatient Care Management Note:    Clerical staff reached patient to schedule a follow up appointment regarding bilaterally edema in lower extremities  Lasix was ordered by attending Dr Cassy Steele on 7/2 and requested she schedule an appointment the next week to evaluate how she is doing and if further testing needs to be ordered  Please see telephone encounter from 7/1/21 with additional information  When clerical staff tried reaching her to schedule lack of transportation was brought up   Isi Vale was speaking with patient on phone and I joined the conversation  Patient reports she only took 2 days of Lasix and then stopped because she reports the medication drained her energy and strength and made her unable to walk  I did try explaining to patient what the medication is for and how it works but was insistent she would not take it and reports the swelling has improved  She denies any chest pain or shortness of breath, fevers, or chills  Patient reports her sister does not drive and is older and unable to accompany her to appointments  She reports she has 2 nieces and her niece Sydni Hillman recently changed jobs and unable to take off  Other niece is Shawna and contact number [phone]  She reports is disabled but can call her to further discuss needs and transportation  Patient reports niece Froylan Gonzalez is trying to get her help in the home  Patient was agreeable to Jignesh 39  We placed call to Froylan Gonzalez  She did report was not the best time to talk and preferred to talk further tomorrow  She did share they are applying for [employer] ge[employer] services and has over the phone interview with the [employer] on Monday 7/12  She reports patient is now needing assistance with ADL's and reports increase in falls  Most recent fall patient did go to the ED of 7/6 due to fallin after she tripped over a step and fell backwards and hit her head and injured right shoulder  Patient ambulates with roller walker       I did try discussing need for appointment and transportation needs  Shawna reports she could not speak with us any further as it was not a good time and preferred to further follow up with us tomorrow  She did report that she feels patient's swelling in her legs was related to the hot weather  Patient has air conditioning but does not always use it  She reports when she took patient to the hospital to the ER swelling was improved  CHW to assist with [employer] application  SW will request from PCP Physician Certification form be completed for [place] waiver services

## 2021-07-08 NOTE — TELEPHONE ENCOUNTER
[address] called [phone] called to inform that the patient was sent to the ER a CT scan was done conflicting report given  Right shoulder acute displaced fracture     Alejandro Johns advised that temporarily they are deviating from the plan to visit twice a week until they receive from ortho instructions on how to have right shoulder precautions

## 2021-07-08 NOTE — PROGRESS NOTES
RICARDO has spoken with this 68 y/o bi-lingual pt this date  Also present was Carina Adam RN/CM who addressed several medical concerns  Please see her note for details  Pt reports she resides with her sister in their 2 story home with 3 eloisa  Her bed room and bath are on the 2 nd floor but she is unable to go upstairs since her fall and shoulder surgery  Since her discharge pt has suffers another fall while going to the bank with her brother  Prior to this she was independent in her ADLs and driving  She is now needing help with her bathing, getting to appointments  Pt relates her niece Ana Briones [phone] is helping her to get into a program to get aises asn she has a meeting with them Monday  SW has discussed the [place] Waiver Program and pt is receptive to referral and for PCP to send in her Medical Certification to the [address] [place] [address]  RICARDO to request same of PCP/ Attending  SW has also discussed the option of applying for [employer] since she now has no transportation  SW has asked if any family or friends can help and pt said no not at this time  Pt was receptive to doing the [employer] application and SW will ask our CHW to assist with same  Pt asked SW discuss all of this with her niece Ana Briones  SW and Carina Adam RN/CM did reach out to niece to discuss she did not have much time to speak and ask we call her tomorrow  She did confirm they have an appointment on Monday with the [employer] for the Waiver Programs as will do a MA application for same  SW/RN/CHW to f/u with pt and assist as indicted       RICARDO to create TASK for the [address] [place] [address] Certification to be completed and faxed to [phone]

## 2021-07-09 ENCOUNTER — TELEPHONE (OUTPATIENT)
Dept: INTERNAL MEDICINE CLINIC | Facility: CLINIC | Age: 76
End: 2021-07-09

## 2021-07-09 ENCOUNTER — PATIENT OUTREACH (OUTPATIENT)
Dept: INTERNAL MEDICINE CLINIC | Facility: CLINIC | Age: 76
End: 2021-07-09

## 2021-07-09 NOTE — PROGRESS NOTES
RICARDO /RN /CM spoke with nimik Chaves [phone] to review the [place] Waver Program    She reports that her aunt has her appointment with them on Monday  She agreed to f/u withSW re the out come  She is aware that our Office will be sending in her Medial certification form   SW also reviewed the [employer] application process  There remains concern re her ability to get to appointment even with Kat Stewart as there are no family members able bodied to help  If approved for the Waiver she could use aides to assist      RICARDO/RN/CHW to remain available to assist as indicted

## 2021-07-09 NOTE — TELEPHONE ENCOUNTER
Folder Color- BLUE    Name of Form-  [place] Dept of [employer] Physician Cert Form     Form to be filled out by- May Rain  Must be signed by Attending    Form to be Faxed  To [phone]    Patient made aware of 10 business day policy

## 2021-07-09 NOTE — PROGRESS NOTES
Outpatient Care Management Note:     Luis Grant and FREDDIE placed follow up call to patient's niece Shana Gross at [phone]  Please see patient outreach encounters from yesterday 7/8/21 with info  Alyssa reports they have phone interview with Geena Patrick on Monday 7/12 and will follow up with us regarding status  Agreeable for CHW to help with [employer] application  Alyssa did share that patient really has no one to assist her to appointments and is not safe to go alone even with L[employer] due to her recent falls  Alyssa does not drive and has her own medical conditions and unable to assist  She reports other niece Adela Roldan unable to assist due to taking a new job  Patient has sister but unable to assist her to appointments and reports Alyssa's uncle unable to assist either and reports there are no other family members  She reports patient obtaining PA waiver services is needed to help with ADL's and getting out for appointments, etc      Patient currently receiving in home physical therapy  Patient has several walkers in the home, shower chair, extra rail to go upstairs, and adequate lighting  Possible need for wheelchair but will require appointment to document need  I did explain the need for follow up appointment  She reports swelling in legs has improved  Unable to schedule in person visit but reports could possibly do a virtual visit with video  She will speak with patient and if agreeable will call PCP office to schedule       She did not have any further questions or concerns and contact numbers give for Luis Pineda

## 2021-07-13 ENCOUNTER — TELEPHONE (OUTPATIENT)
Dept: OBGYN CLINIC | Facility: HOSPITAL | Age: 76
End: 2021-07-13

## 2021-07-13 NOTE — TELEPHONE ENCOUNTER
Patient fell and she injured her right shoulder and she recently had surgery on the left  She is currently getting [facility] and it's at a stand still due to conflicting reports  They would like to know if we could review her MRI and Cat scan reports and give the [facility] a call to confirm to continue PT  The [facility] can be reached at [phone]   Thank you

## 2021-07-13 NOTE — TELEPHONE ENCOUNTER
7/13/21 @ 8:21 am    I called [place] office at [phone] and spoke with [facility] and requested if ortho can review the recent CT scan of right shoulder after her fall as it reports a fracture and ER note states ortho was consulted nothing was acute and was visible on previous imaging  I did mention that occupational therapist Ankita De Dios with [facility] (phone [phone]) called PCP office to report they have therapy on hold until they receive further instructions  I requested for ortho office to further follow up with [facility]  I did mention patient does not have ortho appointment until August  I did mention transportation is an issue at this time and for family to assist her to an appointment

## 2021-07-13 NOTE — TELEPHONE ENCOUNTER
Called [facility]  They are fixated on the Er report and what the doctor states vs the CT reads  I cannot comment on the ER report or anything the Er physician saw or documented  Yes I reviewed the images  The Acromion fx occurred sometime since last year  I cannot comment on if it is new or old as she has fallen multiple times  Based on the  from her left shoulder CT in March - looks to be fractured at that time but is now more displaced  Without an exam I cannot comment on any weight bearing status, treatment or therapy recommendations for the right shoulder  Zhao has already made an appointment for Enedina Newby so we will happy to assess her shoulder at the visit      They can continue with PT for the left side

## 2021-07-20 ENCOUNTER — APPOINTMENT (OUTPATIENT)
Dept: LAB | Facility: CLINIC | Age: 76
End: 2021-07-20
Payer: MEDICARE

## 2021-07-20 ENCOUNTER — OFFICE VISIT (OUTPATIENT)
Dept: INTERNAL MEDICINE CLINIC | Facility: CLINIC | Age: 76
End: 2021-07-20

## 2021-07-20 VITALS
OXYGEN SATURATION: 95 % | SYSTOLIC BLOOD PRESSURE: 132 MMHG | BODY MASS INDEX: 20.82 KG/M2 | TEMPERATURE: 97.7 F | HEART RATE: 77 BPM | WEIGHT: 99.6 LBS | DIASTOLIC BLOOD PRESSURE: 81 MMHG

## 2021-07-20 DIAGNOSIS — R35.0 URINARY FREQUENCY: ICD-10-CM

## 2021-07-20 DIAGNOSIS — Z97.5 IUD (INTRAUTERINE DEVICE) IN PLACE: ICD-10-CM

## 2021-07-20 DIAGNOSIS — R29.6 FREQUENT FALLS: Primary | ICD-10-CM

## 2021-07-20 DIAGNOSIS — Z96.612 S/P REVERSE TOTAL SHOULDER ARTHROPLASTY, LEFT: ICD-10-CM

## 2021-07-20 DIAGNOSIS — M32.19 OTHER SYSTEMIC LUPUS ERYTHEMATOSUS WITH OTHER ORGAN INVOLVEMENT (HCC): ICD-10-CM

## 2021-07-20 DIAGNOSIS — M79.89 SWELLING OF LOWER EXTREMITY: ICD-10-CM

## 2021-07-20 DIAGNOSIS — M05.79 RHEUMATOID ARTHRITIS INVOLVING MULTIPLE SITES WITH POSITIVE RHEUMATOID FACTOR (HCC): ICD-10-CM

## 2021-07-20 DIAGNOSIS — R26.2 AMBULATORY DYSFUNCTION: ICD-10-CM

## 2021-07-20 DIAGNOSIS — N30.91 HEMATURIA DUE TO CYSTITIS: ICD-10-CM

## 2021-07-20 LAB
AMORPH PHOS CRY URNS QL MICRO: ABNORMAL /HPF
BACTERIA UR QL AUTO: ABNORMAL /HPF
BILIRUB UR QL STRIP: NEGATIVE
CLARITY UR: ABNORMAL
COLOR UR: YELLOW
GLUCOSE UR STRIP-MCNC: NEGATIVE MG/DL
HGB UR QL STRIP.AUTO: ABNORMAL
KETONES UR STRIP-MCNC: NEGATIVE MG/DL
LEUKOCYTE ESTERASE UR QL STRIP: ABNORMAL
NITRITE UR QL STRIP: POSITIVE
NON-SQ EPI CELLS URNS QL MICRO: ABNORMAL /HPF
PH UR STRIP.AUTO: 8.5 [PH]
PROT UR STRIP-MCNC: ABNORMAL MG/DL
RBC #/AREA URNS AUTO: ABNORMAL /HPF
SP GR UR STRIP.AUTO: 1.01 (ref 1–1.03)
TRI-PHOS CRY URNS QL MICRO: ABNORMAL /HPF
UROBILINOGEN UR QL STRIP.AUTO: 0.2 E.U./DL
WBC #/AREA URNS AUTO: ABNORMAL /HPF

## 2021-07-20 PROCEDURE — 99214 OFFICE O/P EST MOD 30 MIN: CPT | Performed by: PHYSICIAN ASSISTANT

## 2021-07-20 PROCEDURE — 87077 CULTURE AEROBIC IDENTIFY: CPT

## 2021-07-20 PROCEDURE — 81001 URINALYSIS AUTO W/SCOPE: CPT | Performed by: PHYSICIAN ASSISTANT

## 2021-07-20 PROCEDURE — 87186 SC STD MICRODIL/AGAR DIL: CPT

## 2021-07-20 PROCEDURE — 87086 URINE CULTURE/COLONY COUNT: CPT

## 2021-07-20 RX ORDER — FUROSEMIDE 20 MG/1
10 TABLET ORAL DAILY
Qty: 3 TABLET | Refills: 0 | Status: SHIPPED | OUTPATIENT
Start: 2021-07-20 | End: 2021-08-16 | Stop reason: SDUPTHER

## 2021-07-20 RX ORDER — CIPROFLOXACIN 500 MG/1
TABLET, FILM COATED ORAL
COMMUNITY
Start: 2021-06-16 | End: 2021-07-27 | Stop reason: ALTCHOICE

## 2021-07-20 NOTE — PROGRESS NOTES
Assessment/Plan: On your visit today we reviewed your recent emergency room visit  We did review that your fall was a trip and fall on a step and that you were not experiencing any chest pain, lightheadedness or dizziness prior to this fall  We did review however this is not the 1st fall you have had in the past few months  For safety it would be more important that we assist you with getting evaluated for a wheelchair for when going out in public spaces  You would be able to continue the use of her walker in your home  Also confirm that you are under evaluation for assistance in transportation to be able to get to your medical appointments as well as assistance with life activities in your home  You are recently status post left shoulder rotator cuff surgery however it was the right side that was impacted  We did review that the orthopedist did re-evaluate the imaging from before and the new imaging from her recent ER visit and while there is a fracture it appears that it was there prior to this fall  Their recommendation is for [facility] and physical therapy to continue your planned postoperative therapy for left shoulder and you are scheduled to see the orthopedist next week regarding your right shoulder  We did review however that you have limited almost no range of motion of your right upper extremity and you report this has been since the right rotator cuff surgery not since the fall  We also discussed the swelling in your bilateral feet  This is a cause from sitting with your legs down and not getting up and moving or elevating your legs  As per discussion a prescription for Lasix has been sent to her pharmacy you will cut the tablets in half  You do express that you have tried this in the past and did make you feel tired but you do have the medication should the swelling in her legs increase    Also encourage you that you do have a recliner and need to elevate your legs on a regular basis to also help reduce the swelling  Recent cardiac testing was normal with no evidence of heart failure  Also as per our discussion prior to your recent left rotator cuff surgery you had treated for urine infection but on follow-up testing it did indicate there was a small amount of blood in your urine and you do need to get the follow-up urine testing completed  This will evaluate to make sure that your infection is gone and also re-evaluate if any blood is in your urine  We will contact you with that results and advise if you need additional referral to urology or evaluation  Will keep follow-up appointment with me as scheduled in September  Continue to follow with your rheumatologist for your rheumatoid arthritis and lupus  No problem-specific Assessment & Plan notes found for this encounter  Diagnoses and all orders for this visit:    Frequent falls    Rheumatoid arthritis involving multiple sites with positive rheumatoid factor ([facility] )  -     Ambulatory referral to Physical Therapy; Future    S/P reverse total shoulder arthroplasty, left    Other systemic lupus erythematosus with other organ involvement ([facility] )  -     Ambulatory referral to Physical Therapy; Future    Ambulatory dysfunction  -     Ambulatory referral to Physical Therapy; Future    Swelling of lower extremity  -     furosemide (LASIX) 20 mg tablet; Take 0 5 tablets (10 mg total) by mouth daily for 5 days    Hematuria due to cystitis    Other orders  -     Ascorbic Acid, Vitamin C, (VITAMIN C) 100 MG tablet; Take 400 mg by mouth daily  -     ciprofloxacin (CIPRO) 500 mg tablet; TAKE ONE TABLET BY MOUTH EVERY 12 HOURS FOR 7 DAYS (Patient not taking: Reported on 7/20/2021)          Subjective:      Patient ID: Yulissa Up is a 67 y o  female  Patient presents today for ER follow-up  Patient had presented to the emergency room on July 6th after sustaining a fall      Patient had reported to the ER that she tripped on a step falling backwards she did strike the back of her head  In the emergency room patient was reporting pain over the posterior aspect of her head  Patient however was denying any neck pain or loss of consciousness  Patient is also recently status post left rotator cuff surgery  She had previously had the right repaired  Secondary to the fall patient was reporting increased pain in her right shoulder  CT scan was completed and there was some question about acute verses chronic acromial fracture  Patient was not reporting any significant increased pain on the left side  [facility] however was not moving forward with her physical therapy until clarification from ER  I had our office reach out to Ortho to review the recent CT scan  They do note that she did have a preemie assess acromial fracture but that they would need to evaluate her in person regarding the right shoulder for any additional advice  The recommendation however was to continue with her physical therapy status post left rotator repair  Knees however states [facility] is not doing any therapy  Did review with them that according to the note from the orthopedist they spoke with [facility] directly advising them to continue the therapy on her left shoulder  Patient was scheduled for Ortho in August this appointment has now been moved up to July 27th  Patient had CT of her cervical spine and head during your visit as well  No acute fracture in cervical spine  Patient did have parietal hematoma but no intracranial bleed or acute changes noted  Patient and family are also working with social and community workers for assistance with transportation and assistance in the home  Patient and sister do lives somewhat independently however with the progression of their autoimmune conditions it has become increasingly more difficult        Patient is in the process of working with community health for transportation as well as waiver program to get assistance in the home  Patient really does need to have a wheelchair as she has become more unstable on her feet and has had more than 1 fall in the past 6 months  I did provide them with a script for physical therapy for wheelchair evaluation  On today's visit patient is reporting she only has a little bit of discomfort on the right shoulder but still has more pain on the left shoulder which was the most recent rotator cuff repair  On examination patient has never had great range of motion however at this time she has  Almost 0 range of motion of her right shoulder  When asked to raise she will raise her forearm and she will do a slight shoulder shrug but really cannot elevate extend or rotate her right shoulder  Patient states this is chronic since her original right rotator cuff stating that therapy was never fully completed  I do suspect that home services were limited and patient did not have any transportation for regular outpatient therapy  Patient does have a little bit more range of motion on the left side but still significantly decreased  Discussed with patient and her daughter recommendation is that once she has a little bit better mobility with her shoulders will need to get her in to physical therapy for wheelchair evaluation  Patient has been experiencing more difficulty with walking and stepping secondary to age as well as her chronic rheumatologic conditions  Patient's lack of transportation and inability to get to physical therapy I do also feel has had an influence on the slowed progression in addition to her autoimmune conditions after bilateral shoulder surgeries  I have also reprinted the orders for patient to get the urine testing completed    Prior to approval for her most recent left rotator cuff surgery patient was under treatment for UTI was also noted to have hematuria and was advised would need follow-up evaluation after she completed her surgery  Patient also continues to have fairly significant bilateral pedal edema  Patient did try the Lasix that she was provided however made her feel more weak and significantly increased her urination  Discussed with patient and her niece that is the purpose to help get the fluid out but patient also sits with legs dependent not elevated frequently enough  Will put her back on the Lasix for now at a half a dose for a short trial and was advised that she needs to keep her legs elevated at on a regular basis  The following portions of the patient's history were reviewed and updated as appropriate: allergies, current medications, past family history, past medical history, past social history, past surgical history and problem list     Review of Systems   Constitutional: Negative  Negative for chills, fever and unexpected weight change  Respiratory: Negative  Negative for cough and shortness of breath  Cardiovascular: Positive for leg swelling  Negative for chest pain and palpitations  Musculoskeletal: Positive for arthralgias and myalgias  Skin: Negative  Negative for rash  Neurological: Negative  Negative for dizziness, light-headedness and headaches  Psychiatric/Behavioral: Negative  Objective:      /81 (BP Location: Left arm, Patient Position: Sitting, Cuff Size: Standard)   Pulse 77   Temp 97 7 °F (36 5 °C) (Temporal)   Wt 45 2 kg (99 lb 9 6 oz)   SpO2 95%   BMI 20 82 kg/m²          Physical Exam  Vitals and nursing note reviewed  Constitutional:       General: She is not in acute distress  Cardiovascular:      Rate and Rhythm: Normal rate and regular rhythm  Heart sounds: Normal heart sounds  Pulmonary:      Effort: Pulmonary effort is normal       Breath sounds: Normal breath sounds  Abdominal:      General: Bowel sounds are normal    Musculoskeletal:      Right shoulder: Decreased range of motion  Decreased strength  Left shoulder: Decreased range of motion  Decreased strength  Right lower le+ Pitting Edema present  Left lower le+ Pitting Edema present  Comments: As noted in HPI right upper extremity has basically 0 range of motion  She can raise her forearm and slight shoulder shrug  Left upper extremity she has maybe 10-15% flexion  Skin:     Findings: No rash  Neurological:      Mental Status: She is alert  Cranial Nerves: No cranial nerve deficit

## 2021-07-20 NOTE — PATIENT INSTRUCTIONS
On your visit today we reviewed your recent emergency room visit  We did review that your fall was a trip and fall on a step and that you were not experiencing any chest pain, lightheadedness or dizziness prior to this fall  We did review however this is not the 1st fall you have had in the past few months  For safety it would be more important that we assist you with getting evaluated for a wheelchair for when going out in public spaces  You would be able to continue the use of her walker in your home  Also confirm that you are under evaluation for assistance in transportation to be able to get to your medical appointments as well as assistance with life activities in your home  You are recently status post left shoulder rotator cuff surgery however it was the right side that was impacted  We did review that the orthopedist did re-evaluate the imaging from before and the new imaging from her recent ER visit and while there is a fracture it appears that it was there prior to this fall  Their recommendation is for [facility] and physical therapy to continue your planned postoperative therapy for left shoulder and you are scheduled to see the orthopedist next week regarding your right shoulder  We did review however that you have limited almost no range of motion of your right upper extremity and you report this has been since the right rotator cuff surgery not since the fall  We also discussed the swelling in your bilateral feet  This is a cause from sitting with your legs down and not getting up and moving or elevating your legs  As per discussion a prescription for Lasix has been sent to her pharmacy you will cut the tablets in half  You do express that you have tried this in the past and did make you feel tired but you do have the medication should the swelling in her legs increased    Also encourage you that you do have a recliner and need to elevate your legs on a regular basis to also help reduce the swelling  Recent cardiac testing was normal with no evidence of heart failure  Also as per our discussion prior to your recent left rotator cuff surgery you had treated for urine infection but on follow-up testing it did indicate there was a small amount of blood in your urine and you do need to get the follow-up urine testing completed  This will evaluate to make sure that your infection is gone and also re-evaluate if any blood is in your urine  We will contact you with that results and advise if you need additional referral to urology or evaluation  Will keep follow-up appointment with me as scheduled in September  Continue to follow with your rheumatologist for your rheumatoid arthritis and lupus

## 2021-07-22 LAB — BACTERIA UR CULT: ABNORMAL

## 2021-07-27 ENCOUNTER — OFFICE VISIT (OUTPATIENT)
Dept: OBGYN CLINIC | Facility: HOSPITAL | Age: 76
End: 2021-07-27

## 2021-07-27 ENCOUNTER — HOSPITAL ENCOUNTER (OUTPATIENT)
Dept: RADIOLOGY | Facility: HOSPITAL | Age: 76
Discharge: HOME | End: 2021-07-27
Payer: MEDICARE

## 2021-07-27 VITALS — WEIGHT: 99 LBS | BODY MASS INDEX: 20.69 KG/M2

## 2021-07-27 DIAGNOSIS — Z96.612 S/P REVERSE TOTAL SHOULDER ARTHROPLASTY, LEFT: ICD-10-CM

## 2021-07-27 DIAGNOSIS — T84.498A FAILED ORTHOPEDIC IMPLANT, INITIAL ENCOUNTER (HCC): ICD-10-CM

## 2021-07-27 DIAGNOSIS — M25.511 CHRONIC RIGHT SHOULDER PAIN: Primary | ICD-10-CM

## 2021-07-27 DIAGNOSIS — G89.29 CHRONIC RIGHT SHOULDER PAIN: Primary | ICD-10-CM

## 2021-07-27 PROCEDURE — 73030 X-RAY EXAM OF SHOULDER: CPT

## 2021-07-27 PROCEDURE — 99024 POSTOP FOLLOW-UP VISIT: CPT | Performed by: ORTHOPAEDIC SURGERY

## 2021-07-27 NOTE — PROGRESS NOTES
Mindy Steiner MD personally examined the patient and reviewed the history provided  I agree with the note and the assessment and plan by Rama Augustine PA-C  Assessment:     1) failed left glenoid component reverse total shoulder arthroplasty status post fall     2) chronic right shoulder pain following reverse total shoulder arthroplasty without evidence of implant failure    Plan:     the patient has sustained a failure and displacement of her glenoid base plate of the left shoulder related to her recent fall  At the time of her index procedure it was noted that she was incredibly deficient with bone of the glenoid but we were able to achieved good fixation which was maintained in her postoperative x-rays x2  Unfortunately does not appear her bone was strong enough to prevent failure of the base plate and at this time  She has options to consider revision of the reverse total shoulder to a hemiarthroplasty with removal of the glenoid base plate or to live with the implant in the position it is  Given her multiple medical history and her significant pain she had with anterior instability which led to her undergoing the reverse total shoulder I think that resection of the glenoid base plate and hemiarthroplasty would just lead to further anterior instability and pain, currently the prosthesis is not dislocated and I think the pain relief she has now is better than she would have if her revise this to hemiarthroplasty  Her daughter was present for the visit and her and the patient agree with that approach  She will discontinue physical therapy and we will continue with limited goals of her left shoulder  If she changes her mind wish to undergo revision surgery she will contact the office and we can consider this approach although I feel that that likely would cause more complications than benefit at this time       as for right shoulder I see no evidence of acute change in position of the implant and I do not recommend revision surgery on the right shoulder either at this time and the patient is understanding of that  Assessment  Diagnoses and all orders for this visit:    Chronic right shoulder pain  -     Ambulatory referral to Pain Management; Future    S/P reverse total shoulder arthroplasty, left  -     XR shoulder 2+ vw left; Future  -     Ambulatory referral to Pain Management; Future    Failed left shoulder implant, initial encounter [facility]) following a fall  -     Ambulatory referral to Pain Management; Future        Discussion and Plan:    Pooja Gomes has a difficult problem with her left shoulder  Her base plate has failed secondary to her fall 3 weeks ago  Pain is already improving  The right shoulder implant is in good position without any complication  Recommendation at this time is to live with the left shoulder the way that it is  Pain is improving and will likely continue to improve over the next 3-4 weeks  Her alternative is removal of base plate with conversion to hemiarthroplasty  Down side to that procedure is instability which is why we did the reverse total shoulder 2 months ago  Pooja Gomes will take time to think about her decision  Family accompanying her asked about pain management so referral was placed  Subjective:   Patient ID: Kaitlyn Benavides is a 67 y o  female      Pooja Gomes presents to the office in follow up of her left shoulder  She is 8 weeks from left reverse total shoulder arthroplasty  She complains of pain with motion and rest   Pain localized to the superior aspect of the shoulder  Pain does not radiate  Pooja Gomes admit to recent fall 2 weeks ago  Seen at [address] [place] [address] for evaluation s/p increase in right shoulder pain  Family thought shoulder was dislocated based on how it looked  Xrays show a displaced acromion fracture which was not present at time of our last office x-ray in January  Admits to superior shoulder pain  Pain worse with motion        The following portions of the patient's history were reviewed and updated as appropriate: allergies, current medications, past family history, past medical history, past social history, past surgical history and problem list     Review of Systems   Constitutional: Negative for chills and fever  HENT: Negative for hearing loss and trouble swallowing  Respiratory: Negative for shortness of breath  Cardiovascular: Negative for chest pain  Gastrointestinal: Negative for abdominal pain, constipation and diarrhea  Musculoskeletal:        See HPI    Skin: Negative for wound  Neurological: Negative for weakness, numbness and headaches  Psychiatric/Behavioral: Negative for sleep disturbance  The patient is not nervous/anxious  Objective: Wt 44 9 kg (99 lb)   BMI 20 69 kg/m²       Right Shoulder Exam     Tenderness   The patient is experiencing tenderness in the acromion  Range of Motion   Passive abduction: 70   Forward flexion: 20 (Passive 120)     Other   Erythema: absent  Scars: present (healed incision)  Sensation: normal  Pulse: present      Left Shoulder Exam     Tenderness   The patient is experiencing tenderness in the acromion  Range of Motion   Passive abduction: 80   Forward flexion: 70 (Passive 140)     Other   Erythema: absent  Scars: present (healed incision)  Sensation: normal  Pulse: present             Physical Exam  Constitutional:       Appearance: She is well-developed  HENT:      Head: Normocephalic  Pulmonary:      Breath sounds: Normal breath sounds  No wheezing  Musculoskeletal:      Cervical back: Normal range of motion  Skin:     General: Skin is warm and dry  Neurological:      Mental Status: She is alert and oriented to person, place, and time  Psychiatric:         Behavior: Behavior normal          Thought Content:  Thought content normal          Judgment: Judgment normal            I have personally reviewed pertinent films in PACS and my interpretation is as follows  2 views left shoulder: failed base plate with displacement superiorly    Humeral component without complication  3 views right shoulder: displaced acromion fracture - chronic; stable prosthesis

## 2021-07-28 ENCOUNTER — TELEPHONE (OUTPATIENT)
Dept: INTERNAL MEDICINE CLINIC | Facility: CLINIC | Age: 76
End: 2021-07-28

## 2021-07-28 ENCOUNTER — TELEPHONE (OUTPATIENT)
Dept: OBGYN CLINIC | Facility: HOSPITAL | Age: 76
End: 2021-07-28

## 2021-07-28 NOTE — TELEPHONE ENCOUNTER
Called Curt Fan, [address]  Left a voicemail relaying Katie's message regarding the patient  Advised to call office back with any other questions or concerns

## 2021-07-28 NOTE — TELEPHONE ENCOUNTER
SHAWNA [facility] called, patient is being discharged from [facility] Home Occupational Therapy due to Ortho requesting all therapy being halted until further notice

## 2021-07-28 NOTE — TELEPHONE ENCOUNTER
No new orders  It was discussed with the patient and family  All therapy will be halted until further notice

## 2021-07-28 NOTE — TELEPHONE ENCOUNTER
Marilyn Blackwell from [facility] OT is calling in regards to patient's right shoulder eval  She was advised there was an acute fx found in the CT scan that was performed in the ED  She would like to know weight bearing and activity status  If she is okay to continue could we place a new order in epic  Marilyn Blackwell can be reached at [phone]   Thank you

## 2021-08-12 ENCOUNTER — TELEPHONE (OUTPATIENT)
Dept: OBGYN CLINIC | Facility: HOSPITAL | Age: 76
End: 2021-08-12

## 2021-08-12 NOTE — TELEPHONE ENCOUNTER
Caller is Leidy Gerber, patient's niece  She states her left arm is swollen and she needs an appt  Offered appt with Maranda Carrillo tomorrow, but patient only wants [facility] due to her 78yo  only being able to drive there  Next avail appt to [address] [place] [address] location is 8/31/21    Leidy Gerber is asking if the appt can be virtual     [facility]#[phone]

## 2021-08-12 NOTE — TELEPHONE ENCOUNTER
Spoke to niece  She stated that the [facility] told her that the patient should be seen because she could have a blood clot  Advised niece that if the [facility]NA thinks the patient has a blood clot she needs to go to the ER  Advised that Dr Cindy An is not in tomorrow, the only place she could be seen tomorrow is [facility] with Femi Melara  Nimik reports new bruising in the elbow and swelling from shoulder to wrist      She stated they cannot drive to [facility]  Advised that if she is having severe pain and swelling, cannot be seen in any other location than [place] and cannot wait for the appointment availability there, she should go to the ER  Understanding verbalized  Please be advised

## 2021-08-13 ENCOUNTER — APPOINTMENT (EMERGENCY)
Dept: RADIOLOGY | Facility: HOSPITAL | Age: 76
End: 2021-08-13
Payer: MEDICARE

## 2021-08-13 ENCOUNTER — TELEPHONE (OUTPATIENT)
Dept: INTERNAL MEDICINE CLINIC | Facility: CLINIC | Age: 76
End: 2021-08-13

## 2021-08-13 ENCOUNTER — HOSPITAL ENCOUNTER (EMERGENCY)
Facility: HOSPITAL | Age: 76
Discharge: HOME | End: 2021-08-13
Attending: EMERGENCY MEDICINE
Payer: MEDICARE

## 2021-08-13 ENCOUNTER — APPOINTMENT (EMERGENCY)
Dept: NON INVASIVE DIAGNOSTICS | Facility: HOSPITAL | Age: 76
End: 2021-08-13
Payer: MEDICARE

## 2021-08-13 VITALS
HEIGHT: 58 IN | DIASTOLIC BLOOD PRESSURE: 71 MMHG | HEART RATE: 77 BPM | BODY MASS INDEX: 20.82 KG/M2 | SYSTOLIC BLOOD PRESSURE: 152 MMHG | TEMPERATURE: 97.8 F | RESPIRATION RATE: 18 BRPM | WEIGHT: 99.21 LBS | OXYGEN SATURATION: 97 %

## 2021-08-13 DIAGNOSIS — M79.603 ARM PAIN: ICD-10-CM

## 2021-08-13 DIAGNOSIS — S43.015A ANTERIOR SHOULDER DISLOCATION, LEFT, INITIAL ENCOUNTER: ICD-10-CM

## 2021-08-13 DIAGNOSIS — M79.89 ARM SWELLING: ICD-10-CM

## 2021-08-13 DIAGNOSIS — M79.89 LEFT ARM SWELLING: Primary | ICD-10-CM

## 2021-08-13 LAB
ANION GAP SERPL CALCULATED.3IONS-SCNC: 3 MMOL/L (ref 4–13)
BASOPHILS # BLD AUTO: 0.02 THOUSANDS/ΜL (ref 0–0.1)
BASOPHILS NFR BLD AUTO: 0 % (ref 0–1)
BUN SERPL-MCNC: 16 MG/DL (ref 5–25)
CALCIUM SERPL-MCNC: 8.9 MG/DL (ref 8.3–10.1)
CHLORIDE SERPL-SCNC: 103 MMOL/L (ref 100–108)
CO2 SERPL-SCNC: 30 MMOL/L (ref 21–32)
CREAT SERPL-MCNC: 0.52 MG/DL (ref 0.6–1.3)
EOSINOPHIL # BLD AUTO: 0.06 THOUSAND/ΜL (ref 0–0.61)
EOSINOPHIL NFR BLD AUTO: 1 % (ref 0–6)
ERYTHROCYTE [DISTWIDTH] IN BLOOD BY AUTOMATED COUNT: 15.1 % (ref 11.6–15.1)
GFR SERPL CREATININE-BSD FRML MDRD: 92 ML/MIN/1.73SQ M
GLUCOSE SERPL-MCNC: 90 MG/DL (ref 65–140)
HCT VFR BLD AUTO: 36.2 % (ref 34.8–46.1)
HGB BLD-MCNC: 11.1 G/DL (ref 11.5–15.4)
IMM GRANULOCYTES # BLD AUTO: 0.05 THOUSAND/UL (ref 0–0.2)
IMM GRANULOCYTES NFR BLD AUTO: 1 % (ref 0–2)
LYMPHOCYTES # BLD AUTO: 1.13 THOUSANDS/ΜL (ref 0.6–4.47)
LYMPHOCYTES NFR BLD AUTO: 15 % (ref 14–44)
MCH RBC QN AUTO: 28.3 PG (ref 26.8–34.3)
MCHC RBC AUTO-ENTMCNC: 30.7 G/DL (ref 31.4–37.4)
MCV RBC AUTO: 92 FL (ref 82–98)
MONOCYTES # BLD AUTO: 0.74 THOUSAND/ΜL (ref 0.17–1.22)
MONOCYTES NFR BLD AUTO: 10 % (ref 4–12)
NEUTROPHILS # BLD AUTO: 5.34 THOUSANDS/ΜL (ref 1.85–7.62)
NEUTS SEG NFR BLD AUTO: 73 % (ref 43–75)
NRBC BLD AUTO-RTO: 0 /100 WBCS
PLATELET # BLD AUTO: 364 THOUSANDS/UL (ref 149–390)
PMV BLD AUTO: 7.9 FL (ref 8.9–12.7)
POTASSIUM SERPL-SCNC: 3.8 MMOL/L (ref 3.5–5.3)
RBC # BLD AUTO: 3.92 MILLION/UL (ref 3.81–5.12)
SODIUM SERPL-SCNC: 136 MMOL/L (ref 136–145)
WBC # BLD AUTO: 7.34 THOUSAND/UL (ref 4.31–10.16)

## 2021-08-13 PROCEDURE — 36415 COLL VENOUS BLD VENIPUNCTURE: CPT

## 2021-08-13 PROCEDURE — 96374 THER/PROPH/DIAG INJ IV PUSH: CPT

## 2021-08-13 PROCEDURE — 85025 COMPLETE CBC W/AUTO DIFF WBC: CPT

## 2021-08-13 PROCEDURE — 99284 EMERGENCY DEPT VISIT MOD MDM: CPT

## 2021-08-13 PROCEDURE — 80048 BASIC METABOLIC PNL TOTAL CA: CPT

## 2021-08-13 PROCEDURE — 96375 TX/PRO/DX INJ NEW DRUG ADDON: CPT

## 2021-08-13 PROCEDURE — NC001 PR NO CHARGE: Performed by: ORTHOPAEDIC SURGERY

## 2021-08-13 PROCEDURE — 73030 X-RAY EXAM OF SHOULDER: CPT

## 2021-08-13 PROCEDURE — 99285 EMERGENCY DEPT VISIT HI MDM: CPT | Performed by: EMERGENCY MEDICINE

## 2021-08-13 RX ORDER — OXYCODONE HYDROCHLORIDE 5 MG/1
5 TABLET ORAL EVERY 4 HOURS PRN
Qty: 15 TABLET | Refills: 0 | Status: SHIPPED | OUTPATIENT
Start: 2021-08-13 | End: 2021-10-26 | Stop reason: ALTCHOICE

## 2021-08-13 RX ORDER — KETOROLAC TROMETHAMINE 30 MG/ML
15 INJECTION, SOLUTION INTRAMUSCULAR; INTRAVENOUS ONCE
Status: DISCONTINUED | OUTPATIENT
Start: 2021-08-13 | End: 2021-08-13

## 2021-08-13 RX ORDER — HYDROMORPHONE HCL/PF 1 MG/ML
0.5 SYRINGE (ML) INJECTION ONCE
Status: COMPLETED | OUTPATIENT
Start: 2021-08-13 | End: 2021-08-13

## 2021-08-13 RX ORDER — KETOROLAC TROMETHAMINE 30 MG/ML
15 INJECTION, SOLUTION INTRAMUSCULAR; INTRAVENOUS ONCE
Status: COMPLETED | OUTPATIENT
Start: 2021-08-13 | End: 2021-08-13

## 2021-08-13 RX ADMIN — KETOROLAC TROMETHAMINE 15 MG: 30 INJECTION, SOLUTION INTRAMUSCULAR; INTRAVENOUS at 12:19

## 2021-08-13 RX ADMIN — HYDROMORPHONE HYDROCHLORIDE 0.5 MG: 1 INJECTION, SOLUTION INTRAMUSCULAR; INTRAVENOUS; SUBCUTANEOUS at 14:05

## 2021-08-13 NOTE — ED PROVIDER NOTES
History  No chief complaint on file  Patient is a 79-year-old female with past medical history significant for lupus and joint replacement surgery of shoulders  Patient is presenting with 2 day history of left arm edema  Patient states that in June she had a shoulder replacement surgery that was a success  Patient states that about a month ago she had a fall and displaced the replacement and has had problems since with pain  Patient followed up with Orthopedics states they were unable to fix the joint and that she will have to live with the pain  They referred her to pain management who she has yet to see  Patient states the last 2 days she has noticed that her left arm has been swelling from her shoulder down to her wrist she denies any numbness or tingling in the affected area denies any warmth or decreased sensation the extremity  She denies any recent falls or trauma to the area  She denies any fevers, chills, nausea vomiting, diarrhea, constipation, shortness of breath, chest pain, abdominal pain  Patient has been taking Tylenol and Advil for pain with little relief  Patient is not taking any blood thinners and does not have history of blood clots in the past   Patient currently rates her pain an 8/10  Prior to Admission Medications   Prescriptions Last Dose Informant Patient Reported? Taking?    Ascorbic Acid, Vitamin C, (VITAMIN C) 100 MG tablet   Yes No   Sig: Take 400 mg by mouth daily   Multiple Vitamins-Minerals (CENTRUM SILVER PO)  Self Yes No   Sig: Take 1 tablet by mouth daily   Psyllium (METAMUCIL FIBER PO)  Self Yes No   Sig: Take 1 packet by mouth 3 (three) times a day    Vitamin E 400 units TABS  Self Yes No   Sig: Take 400 Units by mouth daily     docusate sodium (COLACE) 100 mg capsule   No No   Sig: Take 1 capsule (100 mg total) by mouth 2 (two) times a day   furosemide (LASIX) 20 mg tablet   No No   Sig: Take 0 5 tablets (10 mg total) by mouth daily for 5 days   gabapentin (NEURONTIN) 300 mg capsule   No No   Sig: TAKE ONE CAPSULE BY MOUTH THREE TIMES A DAY   hydroxychloroquine (PLAQUENIL) 200 mg tablet  Self Yes No   Si pill am 1/2 pill pm   levothyroxine 25 mcg tablet   No No   Sig: TAKE 1 TABLET BY MOUTH EVERY DAY   methotrexate 2 5 MG tablet  Self No No   Sig: 3 tablets by mouth once a week on Thursday   nortriptyline (PAMELOR) 50 mg capsule   No No   Sig: TAKE 1 CAPSULE BY MOUTH AT BEDTIME   predniSONE 5 mg tablet  Self Yes No   Sig: Take 5 mg by mouth daily      Facility-Administered Medications: None       Past Medical History:   Diagnosis Date    Acute blood loss anemia 2020    Aftercare following joint replacement 2020    Patient had right reversed total shoulder arthroplasty   Pain was controlled when he left the hospital       Anxiety     Arthritis     Depression     Disease of thyroid gland     HYPO    Femur neck fracture (HCC)     GERD (gastroesophageal reflux disease)     Hyperthyroidism     RESOLVED: [id number]    Osteoporosis     Polio     PVD (peripheral vascular disease) ([facility] [address]     Right BBB/left ant fasc block     UTI (urinary tract infection)     Varicella infection     LAST ASSESSED: [id number]       Past Surgical History:   Procedure Laterality Date    APPENDECTOMY      HIP ARTHROPLASTY Left 2017    Procedure: REMOVAL IM NAIL CONVERSION TO TOTAL HIP ARTHROPLASTY POSTERIOR APPROACH;  Surgeon: Zachery Dorman MD;  [facility];  Service: Orthopedics    HIP FRACTURE SURGERY      HIP SURGERY      both hips replaced; left , right    JOINT REPLACEMENT Right     HIP TOTAL    FL CONV PREV HIP SURG TO TOT HIP Espiridion Mitts Left 10/4/2017    Procedure: Hareware removal of left hip;  Surgeon: Zachery Dorman MD;  [facility];  Service: Orthopedics    FL RECONSTR TOTAL SHOULDER IMPLANT Right 2020    Procedure: ARTHROPLASTY SHOULDER REVERSE;  Surgeon: Razia Koenig MD;  [facility];  Service: Orthopedics    NC RECONSTR TOTAL SHOULDER IMPLANT Left 6/1/2021    Procedure: ARTHROPLASTY SHOULDER REVERSE;  Surgeon: Stevan Knox MD;  [facility];  Service: Orthopedics    REVISION TOTAL HIP ARTHROPLASTY Right     TONSILLECTOMY         Family History   Problem Relation Age of Onset    Rheum arthritis Mother     Rheum arthritis Sister     Prostate cancer Brother      I have reviewed and agree with the history as documented  E-Cigarette/Vaping    E-Cigarette Use Never User      E-Cigarette/Vaping Substances    Nicotine No     THC No     CBD No     Flavoring No     Other No     Unknown No      Social History     Tobacco Use    Smoking status: Former Smoker    Smokeless tobacco: Never Used    Tobacco comment: quit 20-30 years ago   Vaping Use    Vaping Use: Never used   Substance Use Topics    Alcohol use: Not Currently    Drug use: Not Currently        Review of Systems   Constitutional: Positive for activity change  Negative for appetite change, chills, fatigue and fever  Respiratory: Negative for cough, chest tightness and shortness of breath  Cardiovascular: Positive for leg swelling  Negative for chest pain  Chronic leg swelling   Gastrointestinal: Negative for abdominal pain, constipation, diarrhea, nausea and vomiting  Musculoskeletal: Positive for arthralgias and joint swelling  Skin: Negative for color change  Neurological: Negative for dizziness, tremors and headaches  Psychiatric/Behavioral: Negative for agitation and behavioral problems  All other systems reviewed and are negative  Physical Exam  ED Triage Vitals   Temp Pulse Resp BP SpO2   -- -- -- -- --      Temp src Heart Rate Source Patient Position - Orthostatic VS BP Location FiO2 (%)   -- -- -- -- --      Pain Score       --             Orthostatic Vital Signs  There were no vitals filed for this visit  Physical Exam  Vitals and nursing note reviewed     Constitutional:       General: She is not in acute distress  Appearance: Normal appearance  She is not toxic-appearing  HENT:      Head: Normocephalic and atraumatic  Right Ear: External ear normal       Left Ear: External ear normal       Nose: Nose normal       Mouth/Throat:      Mouth: Mucous membranes are moist    Eyes:      Extraocular Movements: Extraocular movements intact  Neck:      Comments: Right trapezius tenderness to palpation  Cardiovascular:      Rate and Rhythm: Normal rate and regular rhythm  Pulses: Normal pulses  Heart sounds: Normal heart sounds  Pulmonary:      Effort: Pulmonary effort is normal       Breath sounds: Normal breath sounds  Abdominal:      General: Abdomen is flat  Bowel sounds are normal       Palpations: Abdomen is soft  Musculoskeletal:         General: Swelling present  Cervical back: Normal range of motion  Left lower leg: Edema present  Comments: Limited range of motion upper extremity chronic according to daughter  Left upper extremity swelling from wrist to shoulder  Bilateral upper extremity pulses intact and equal  No rashes or signs of cellulitis  Vertical incision from previous shoulder replacement surgery-clean, dry, no surrounding erythema   Skin:     General: Skin is warm and dry  Capillary Refill: Capillary refill takes less than 2 seconds  Findings: No rash  Neurological:      General: No focal deficit present  Mental Status: She is alert and oriented to person, place, and time  Sensory: No sensory deficit  Motor: No weakness  Psychiatric:         Mood and Affect: Mood normal          Behavior: Behavior normal          ED Medications  Medications - No data to display    Diagnostic Studies  Results Reviewed     None                 No orders to display         Procedures  Procedures      ED Course  ED Course as of Aug 13 1540   Fri Aug 13, 2021   1214 Will evaluate patient with CBC, BMP, x-ray of the shoulder    Will treat patient's pain management with Toradol IV      1233 I reviewed patient's chart and noted on 07/27 ortho evaluated the patient and as followed "the patient has sustained a failure and displacement of her glenoid base plate of the left shoulder related to her recent fall  At the time of her index procedure it was noted that she was incredibly deficient with bone of the glenoid but we were able to achieved good fixation which was maintained in her postoperative x-rays x2  Unfortunately does not appear her bone was strong enough to prevent failure of the base plate and at this time  She has options to consider revision of the reverse total shoulder to a hemiarthroplasty with removal of the glenoid base plate or to live with the implant in the position it is  Given her multiple medical history and her significant pain she had with anterior instability which led to her undergoing the reverse total shoulder I think that resection of the glenoid base plate and hemiarthroplasty would just lead to further anterior instability and pain, currently the prosthesis is not dislocated and I think the pain relief she has now is better than she would have if her revise this to hemiarthroplasty  "        1233 Today when reviewing the x-ray it appears that the she shoulder is both dislocated as well as displaced  I tried to call the ortho on call number nobody answered so I sent a tiger text  1236 Hemoglobin(!): 11 1   1250 Creatinine(!): 0 52   1250 Paged ortho      1340 Called extension [phone] was instructed to tiger text Naresh Laurent  [address] with ortho who will come evaluate patient  Most likely need sedation  1407 Discussed with patient and her daughter about the next steps in management  Went over the possibility of conscious sedation for reduction of shoulder  Patient is worried about this as she has brittle bones as told by her orthopedic    Informed patient that she will be able to talk to the orthopedic resident before anything is decided  56 Spoke with ortho resident- patient will most likely need surgery they will not be reducing it today  1538 Patient informed of d/c instructions  Ordered pain medications for patient and gave her a sling  Instructed her not to use her arm and to follow up with ortho  They understand and have no questions at this time  MDM      Disposition  Final diagnoses:   None     ED Disposition     None      Follow-up Information    None         Patient's Medications   Discharge Prescriptions    No medications on file     No discharge procedures on file  PDMP Review       Value Time User    PDMP Reviewed  Yes 6/3/2021 12:26 PM Francois Durham MD           ED Provider  Attending physically available and evaluated Geetha Rivera  FREDDIE managed the patient along with the ED Attending      Electronically Signed by         Alonso Gaucher, DO  08/13/21 1900

## 2021-08-13 NOTE — ED ATTENDING ATTESTATION
8/13/2021  IRoxie MD, saw and evaluated the patient  I have discussed the patient with the resident/non-physician practitioner and agree with the resident's/non-physician practitioner's findings, Plan of Care, and MDM as documented in the resident's/non-physician practitioner's note, except where noted  All available labs and Radiology studies were reviewed  I was present for key portions of any procedure(s) performed by the resident/non-physician practitioner and I was immediately available to provide assistance  At this point I agree with the current assessment done in the Emergency Department  I have conducted an independent evaluation of this patient a history and physical is as follows:    ED Course         Critical Care Time  Procedures    66 yo female with left arm swelling  Pt with with hx of shoulder replacement in June then fell one month ago and damaged replacement, nonop management  Pt with few days of swelling of whole arm  Pt with numbness,tingling, weakness, no warmth  No hx of dvt, no n/v/d, no blood thinners  Pt with left shoulder pain  Vss, afebrile, lungs cta, rrr, abdomen soft nontender, left shoulder limited rom, anterior, no warmth, no redness, swelling noted, nvi  Labs, pain meds, xray shoulder and dvt ultrasound

## 2021-08-13 NOTE — TELEPHONE ENCOUNTER
(8/12/2021) Receive call from [address] [place] [address] to report that Odalis Aceves is having and bilateral knee edema going down to her feets and toes and starting today (8/12/2021) patient have an edema on her left upper extremity around the shoulder and also the wrist  She had surgery a month ago  Juan José Ray would call the orthopedic office to notify them too  Juan José Ray state if patient pcp would like to talk to her she can be contacted at [phone]

## 2021-08-13 NOTE — DISCHARGE INSTRUCTIONS
Discharge Instructions - Luis Antonio Mckeon Colon 67 y o  female MRN: [id number]  Unit/Bed#: X ray    Weight Bearing Status:                                           Do not bear weight through left upper extremity, use sling at all times    Pain:  Continue analgesics as directed     Appt Instructions: If you do not have your appointment, please call the clinic at [phone] t  Otherwise followup as scheduled     Contact the office sooner if you experience any increased numbness/tingling in the extremities or skin wounds  Miscellaneous: Thank you for coming to the ER today  We treated your symptoms here and are sending you home with a prescription for pain medications please take them as prescribed on the bottle  Please call to schedule an orthopedic follow up at your earliest convenience  If at any point you experience any new or worsening symptoms do not hesitate to come back to the hospital to be evaluated  Thank you and hope you have a great rest of your day

## 2021-08-13 NOTE — CONSULTS
Orthopedics   Geetha Colon 67 y o  female MRN: [id number]  Unit/Bed#: X ray      Chief Complaint:   left shoulder pain    HPI:  67 y o  right hand dominant female complaining of left shoulder pain  Patient is s/p bilateral reverse total shoulder arthroplasty by Dr Agustin Booth  Right shoulder 9/2020 and left shoulder 6/2021  Unfortunately she had a fall in 7/2021 resulting in fracture and loss of fixation of left glenoid base plate  Discussion was had at that time comparing revision surgery versus attempt at nonoperative treatment  The humeral component was located on the glenoid at that time  Currently, she reports no acute trauma, just increased pain and decreased left shoulder function for past 3 days  Pain present at rest, but better if she doesn't move the arm  Worse with attempted movement or direct pressure to left shoulder  Patient denies any numbness or tingling in the extremity  No fevers chills  No new skin wounds noted  Of note she has a right rev TSA done by Dr Agustin Booth in 9/2020 that causes her occasional pain but is at baseline level of function, no acute injuries to that side  Review Of Systems:   · Skin: Normal  · Neuro: See HPI  · Musculoskeletal: See HPI  · 14 point review of systems negative except as stated above     Past Medical History:   Past Medical History:   Diagnosis Date    Acute blood loss anemia 9/9/2020    Aftercare following joint replacement 9/9/2020    Patient had right reversed total shoulder arthroplasty   Pain was controlled when he left the hospital       Anxiety     Arthritis     Depression     Disease of thyroid gland     HYPO    Femur neck fracture (HCC)     GERD (gastroesophageal reflux disease)     Hyperthyroidism     RESOLVED: [id number]    Osteoporosis     Polio     PVD [facility]peripheral vascular disease) ([facility] [address] )     Right BBB/left ant fasc block     UTI (urinary tract infection)     Varicella infection     LAST ASSESSED: [id number]       Past Surgical History:   Past Surgical History:   Procedure Laterality Date    APPENDECTOMY      HIP ARTHROPLASTY Left 11/27/2017    Procedure: REMOVAL IM NAIL CONVERSION TO TOTAL HIP ARTHROPLASTY POSTERIOR APPROACH;  Surgeon: La Galvan MD;  [facility];  Service: Orthopedics    HIP FRACTURE SURGERY      HIP SURGERY      both hips replaced;2013 left ,2014 right    JOINT REPLACEMENT Right     HIP TOTAL    DE CONV PREV HIP SURG TO TOT HIP Carlos Garcia Left 10/4/2017    Procedure: Angela Quintanilla removal of left hip;  Surgeon: La Galvan MD;  [facility];  Service: Orthopedics    DE RECONSTR TOTAL SHOULDER IMPLANT Right 9/2/2020    Procedure: ARTHROPLASTY SHOULDER REVERSE;  Surgeon: Yokasta Osei MD;  [facility];  Service: Orthopedics    DE RECONSTR TOTAL SHOULDER IMPLANT Left 6/1/2021    Procedure: ARTHROPLASTY SHOULDER REVERSE;  Surgeon: Yokasta Osei MD;  [facility];  Service: Orthopedics    REVISION TOTAL HIP ARTHROPLASTY Right     TONSILLECTOMY         Family History:  Family history reviewed and non-contributory  Family History   Problem Relation Age of Onset    Rheum arthritis Mother     Rheum arthritis Sister     Prostate cancer Brother        Social History:  Social History     Socioeconomic History    Marital status:      Spouse name: Not on file    Number of children: 1    Years of education: Not on file    Highest education level: Not on file   Occupational History    Occupation: RETIRED    Tobacco Use    Smoking status: Former Smoker    Smokeless tobacco: Never Used    Tobacco comment: quit 20-30 years ago   Vaping Use    Vaping Use: Never used   Substance and Sexual Activity    Alcohol use: Not Currently    Drug use: Not Currently    Sexual activity: Not Currently   Other Topics Concern    Not on file   Social History Narrative    Always uses seat belt    Caffeine use    Buddhist    Single as per all scripts      Social Determinants of Health Financial Resource Strain: Low Risk     Difficulty of Paying Living Expenses: Not hard at all   Food Insecurity: No Food Insecurity    Worried About Running Out of Food in the Last Year: Never true    Scarlett of Food in the Last Year: Never true   Transportation Needs: No Transportation Needs    Lack of Transportation (Medical): No    Lack of Transportation (Non-Medical): No   Physical Activity: Inactive    Days of Exercise per Week: 0 days    Minutes of Exercise per Session: 0 min   Stress: No Stress Concern Present    Feeling of Stress : Not at all   Social Connections: Socially Isolated    Frequency of Communication with Friends and Family: Three times a week    Frequency of Social Gatherings with Friends and Family: Twice a week    Attends Tenriism Services: Never    Active Member of Clubs or Organizations: No    Attends Club or Organization Meetings: Never    Marital Status:    Intimate Partner Violence: Not At Risk    Fear of Current or Ex-Partner: No    Emotionally Abused: No    Physically Abused: No    Sexually Abused: No       Allergies:   No Known Allergies        Labs:  0   Lab Value Date/Time    HCT 36 2 08/13/2021 1218    HCT 34 2 (L) 06/02/2021 0742    HCT 37 7 04/12/2021 0814    HCT 36 8 12/16/2014 0727    HCT 28 8 (L) 10/30/2014 0626    HCT 29 2 (L) 10/27/2014 0648    HGB 11 1 (L) 08/13/2021 1218    HGB 10 4 (L) 06/02/2021 0742    HGB 11 8 04/12/2021 0814    HGB 11 9 12/16/2014 0727    HGB 8 8 (L) 10/30/2014 0626    HGB 9 0 (L) 10/27/2014 0648    INR 0 96 05/11/2021 0935    INR 0 96 10/13/2014 0608    WBC 7 34 08/13/2021 1218    WBC 9 30 06/02/2021 0742    WBC 6 43 04/12/2021 0814    WBC 5 60 12/16/2014 0727    WBC 5 58 10/30/2014 0626    WBC 6 95 10/27/2014 0648    ESR 15 04/12/2021 0814    CRP <3 0 04/12/2021 0814       Meds:  No current facility-administered medications for this encounter      Current Outpatient Medications:     Ascorbic Acid, Vitamin C, (VITAMIN C) 100 MG tablet, Take 400 mg by mouth daily, Disp: , Rfl:     docusate sodium (COLACE) 100 mg capsule, Take 1 capsule (100 mg total) by mouth 2 (two) times a day, Disp: 10 capsule, Rfl: 0    gabapentin (NEURONTIN) 300 mg capsule, TAKE ONE CAPSULE BY MOUTH THREE TIMES A DAY, Disp: 270 capsule, Rfl: 1    hydroxychloroquine (PLAQUENIL) 200 mg tablet, 1 pill am 1/2 pill pm, Disp: , Rfl:     levothyroxine 25 mcg tablet, TAKE 1 TABLET BY MOUTH EVERY DAY, Disp: 90 tablet, Rfl: 1    methotrexate 2 5 MG tablet, 3 tablets by mouth once a week on Thursday, Disp:  , Rfl:     Multiple Vitamins-Minerals (CENTRUM SILVER PO), Take 1 tablet by mouth daily, Disp: , Rfl:     nortriptyline (PAMELOR) 50 mg capsule, TAKE 1 CAPSULE BY MOUTH AT BEDTIME, Disp: 90 capsule, Rfl: 0    predniSONE 5 mg tablet, Take 5 mg by mouth daily, Disp: , Rfl:     Psyllium (METAMUCIL FIBER PO), Take 1 packet by mouth 3 (three) times a day , Disp: , Rfl:     Vitamin E 400 units TABS, Take 400 Units by mouth daily  , Disp: , Rfl:     furosemide (LASIX) 20 mg tablet, Take 0 5 tablets (10 mg total) by mouth daily for 5 days, Disp: 3 tablet, Rfl: 0    Blood Culture:   Lab Results   Component Value Date    BLOODCX No Growth After 5 Days  12/02/2019    BLOODCX No Growth After 5 Days  12/02/2019       Wound Culture:   No results found for: WOUNDCULT    Ins and Outs:  No intake/output data recorded  Physical Exam:   /71 (BP Location: Right arm)   Pulse 77   Temp 97 8 °F (36 6 °C) (Oral)   Resp 18   Ht 4' 10" (1 473 m)   Wt 45 kg (99 lb 3 3 oz)   SpO2 97%   BMI 20 73 kg/m²   Gen: Alert and oriented   HEENT: eyes clear, moist mucus membranes, hearing intact  Respiratory: Bilateral chest rise  No audible wheezing found  Cardiovascular: Regular Rate and Rhythm  Musculoskeletal: left upper extremity  · Skin pink, dry, and intact  There are no open wounds about left shoulder   The humeral component can be palpated below the soft tissues anteriorly in the shoulder  The baseplate component can be palpated below acromion  · Painful range of motion of left shoudler with block to Abduction, FF, ER  · Active and passive ROM limited by pain  · Sensation intact to axillary, musculocutaneous, radial, ulna, median nerves  Sensation intact over the deltoid  · Motor intact to musculocutaneous, radial, ulnar, and median nerves  Intact deltoid twitch  · Palpable radial pulse    Radiology:   I personally reviewed the films  X-rays AP and scapY left shoulder shows loss of fixation of glenoid baseplate with superior and posterior migration of glenosphere  The humeral component is now dislocated off the glenosphere  There is no loosening of humeral component  There are no new fractures of the clavicle, humerus, or scapula       _*_*_*_*_*_*_*_*_*_*_*_*_*_*_*_*_*_*_*_*_*_*_*_*_*_*_*_*_*_*_*_*_*_*_*_*_*_*_*_*_*    Assessment:  67 y o  female with left reverse total shoulder dislocation in setting of glenoid base plate failure after prior fall  Patient will likely require surgical revision if this remains painful and functionally limiting  Plan:   · NWB left upper extremity in sling  · Analgesics recommendations for pain per ED  · Body mass index is 20 73 kg/m²  Normal weight  Recommend nutrition and physical activity    · F/U as soon as possible next week with Dr Ina Norwood  · Case discussed with senior resident      Lalo Cedeno MD

## 2021-08-16 ENCOUNTER — TELEPHONE (OUTPATIENT)
Dept: OBGYN CLINIC | Facility: HOSPITAL | Age: 76
End: 2021-08-16

## 2021-08-16 NOTE — TELEPHONE ENCOUNTER
Needs to be in person  We had a very thorough discussion at last visit about her limited treatment options  This is not something that should occur virtually  First available at [facility] has been offered and they refuse/canceled  First available [facility] is limited as we are only there in PM only 1-2 days a MONTH    If they change their mind about [facility] (3 miles from [place]), then we can see at first available [facility] date

## 2021-08-16 NOTE — TELEPHONE ENCOUNTER
CALLED AND SPOKE TO NIECE (Nichole Vasquez)  APPOINTMENT SCHEDULED IN THE [facility] OFFICE AS REQUESTED    FIRST AVAIL WAS Tuesday AUGUST 31 AT 3:30 PM  NIECE AWARE OF DATE, TIME AND LOCATION

## 2021-08-16 NOTE — TELEPHONE ENCOUNTER
DR Shauna Romero  RE: VV to discuss [address]  CB# [phone]   Email: [email or web address]    Patients alyssiamik asked if DR Shauna Romero can accommodate a VV to discuss surgery with DR Shauna Churchill was made aware that generally, surgical discussions need to happen in person in order to fill out Sx consent paper owemerson Churchill asked to speak to Dr Brittanie Donahue to see if this can be done virtually

## 2021-08-16 NOTE — TELEPHONE ENCOUNTER
Called patient and she confirm still having bilateral leg swelling and she doesn't have any Laxis left and yes, she would need a new script sent to her pharmacy and the referral for Cardiology

## 2021-08-19 ENCOUNTER — TELEPHONE (OUTPATIENT)
Dept: INTERNAL MEDICINE CLINIC | Facility: CLINIC | Age: 76
End: 2021-08-19

## 2021-08-19 NOTE — TELEPHONE ENCOUNTER
Folder Color- Blue    Name of [address] [place] [address] and plan of care forms    Form to be filled out by- Tayler B    Form to be Faxed: [phone]    Patient made aware of 10 business day policy

## 2021-08-19 NOTE — TELEPHONE ENCOUNTER
Called and advised patient as per note, patient understood and had no further questions  I provided her with cardiology number to call and schedule

## 2021-08-25 ENCOUNTER — OFFICE VISIT (OUTPATIENT)
Dept: OBGYN CLINIC | Facility: HOSPITAL | Age: 76
End: 2021-08-25
Payer: MEDICARE

## 2021-08-25 ENCOUNTER — OFFICE VISIT (OUTPATIENT)
Dept: OCCUPATIONAL THERAPY | Facility: HOSPITAL | Age: 76
End: 2021-08-25

## 2021-08-25 VITALS
HEIGHT: 58 IN | SYSTOLIC BLOOD PRESSURE: 126 MMHG | HEART RATE: 76 BPM | DIASTOLIC BLOOD PRESSURE: 69 MMHG | BODY MASS INDEX: 20.73 KG/M2

## 2021-08-25 DIAGNOSIS — G56.01 CARPAL TUNNEL SYNDROME ON RIGHT: Primary | ICD-10-CM

## 2021-08-25 DIAGNOSIS — M79.641 RIGHT HAND PAIN: Primary | ICD-10-CM

## 2021-08-25 PROCEDURE — 20526 THER INJECTION CARP TUNNEL: CPT | Performed by: ORTHOPAEDIC SURGERY

## 2021-08-25 PROCEDURE — 99213 OFFICE O/P EST LOW 20 MIN: CPT | Performed by: ORTHOPAEDIC SURGERY

## 2021-08-25 RX ORDER — BETAMETHASONE SODIUM PHOSPHATE AND BETAMETHASONE ACETATE 3; 3 MG/ML; MG/ML
3 INJECTION, SUSPENSION INTRA-ARTICULAR; INTRALESIONAL; INTRAMUSCULAR; SOFT TISSUE
Status: COMPLETED | OUTPATIENT
Start: 2021-08-25 | End: 2021-08-25

## 2021-08-25 RX ORDER — LIDOCAINE HYDROCHLORIDE 10 MG/ML
1 INJECTION, SOLUTION INFILTRATION; PERINEURAL
Status: COMPLETED | OUTPATIENT
Start: 2021-08-25 | End: 2021-08-25

## 2021-08-25 RX ADMIN — BETAMETHASONE SODIUM PHOSPHATE AND BETAMETHASONE ACETATE 3 MG: 3; 3 INJECTION, SUSPENSION INTRA-ARTICULAR; INTRALESIONAL; INTRAMUSCULAR; SOFT TISSUE at 09:08

## 2021-08-25 RX ADMIN — LIDOCAINE HYDROCHLORIDE 1 ML: 10 INJECTION, SOLUTION INFILTRATION; PERINEURAL at 09:08

## 2021-08-25 NOTE — PROGRESS NOTES
ASSESSMENT/PLAN:    Assessment:   Right Carpal Tunnel Syndrome    Plan:   Steroid injection  Night Bracing  Hand therapy for exercises    Follow Up:  8  week(s)    To Do Next Visit:  Reevaluation    General Discussions:     Carpal Tunnel Syndrome: The anatomy and physiology of carpal tunnel syndrome was discussed with the patient today  Increase pressure localized under the transverse carpal ligament can cause pain, numbness, tingling, or dysesthesias within the median nerve distribution as well as feelings of fatigue, clumsiness, or awkwardness  These symptoms typically occur at night and worse in the morning upon waking  Eventually, untreated carpal tunnel syndrome can result in weakness and permanent loss of muscle within the thenar compartment of the hand  Treatment options were discussed with the patient  Conservative treatment includes nocturnal resting splints to keep the nerve in a neutral position, ergonomic changes within the work or home environment, activity modification, and tendon gliding exercises  Steroid injections within the carpal canal can help a majority of patients, however this is often self-limited in a majority of patients  Surgical intervention to divide the transverse carpal ligament typically results in a long-lasting relief of the patient's complaints, with the recurrence rate of less than 1%        _____________________________________________________  CHIEF COMPLAINT:  Chief Complaint   Patient presents with    Right Wrist - Numbness, Carpal Tunnel, Pain         SUBJECTIVE:  Anne-Marie Novak is a 67 y o  female who presents with Numbness and weakness to the right hand, index finger, long finger and thumb  This started >3 year(s) ago as Insidious  Radiation: Yes to the  index finger, long finger and thumb  Previous Treatments: None  Associated symptoms: as above  Handedness: right  Work status: retired  She uses a walker for ambulation at all times       PAST MEDICAL HISTORY:  Past Medical History:   Diagnosis Date    Acute blood loss anemia 9/9/2020    Aftercare following joint replacement 9/9/2020    Patient had right reversed total shoulder arthroplasty   Pain was controlled when he left the hospital       Anxiety     Arthritis     Depression     Disease of thyroid gland     HYPO    Femur neck fracture (HCC)     GERD (gastroesophageal reflux disease)     Hyperthyroidism     RESOLVED: [id number]    Osteoporosis     Polio     PVD (peripheral vascular disease) ([address] )     Right BBB/left ant fasc block     UTI (urinary tract infection)     Varicella infection     LAST ASSESSED: [id number]       PAST SURGICAL HISTORY:  Past Surgical History:   Procedure Laterality Date    APPENDECTOMY      HIP ARTHROPLASTY Left 11/27/2017    Procedure: REMOVAL IM NAIL CONVERSION TO TOTAL HIP ARTHROPLASTY POSTERIOR APPROACH;  Surgeon: Kenneth Evans MD;  [facility];  Service: Orthopedics    HIP FRACTURE SURGERY      HIP SURGERY      both hips replaced;2013 left ,2014 right    JOINT REPLACEMENT Right     HIP TOTAL    WA CONV PREV HIP SURG TO TOT HIP Sharron Abu Left 10/4/2017    Procedure: Dulcy Staple removal of left hip;  Surgeon: Kenneth Evans MD;  [facility];  Service: Orthopedics    WA RECONSTR TOTAL SHOULDER IMPLANT Right 9/2/2020    Procedure: ARTHROPLASTY SHOULDER REVERSE;  Surgeon: Sidney Bajwa MD;  [facility];  Service: Orthopedics    WA RECONSTR TOTAL SHOULDER IMPLANT Left 6/1/2021    Procedure: ARTHROPLASTY SHOULDER REVERSE;  Surgeon: Sidney Bajwa MD;  [facility];  Service: Orthopedics    REVISION TOTAL HIP ARTHROPLASTY Right     TONSILLECTOMY         FAMILY HISTORY:  Family History   Problem Relation Age of Onset    Rheum arthritis Mother     Rheum arthritis Sister     Prostate cancer Brother        SOCIAL HISTORY:  Social History     Tobacco Use    Smoking status: Former Smoker    Smokeless tobacco: Never Used    Tobacco comment: quit 20-30 years ago   Vaping Use    Vaping Use: Never used   Substance Use Topics    Alcohol use: Not Currently    Drug use: Not Currently       MEDICATIONS:    Current Outpatient Medications:     Ascorbic Acid, Vitamin C, (VITAMIN C) 100 MG tablet, Take 400 mg by mouth daily, Disp: , Rfl:     docusate sodium (COLACE) 100 mg capsule, Take 1 capsule (100 mg total) by mouth 2 (two) times a day, Disp: 10 capsule, Rfl: 0    gabapentin (NEURONTIN) 300 mg capsule, TAKE ONE CAPSULE BY MOUTH THREE TIMES A DAY, Disp: 270 capsule, Rfl: 1    hydroxychloroquine (PLAQUENIL) 200 mg tablet, 1 pill am 1/2 pill pm, Disp: , Rfl:     levothyroxine 25 mcg tablet, TAKE 1 TABLET BY MOUTH EVERY DAY, Disp: 90 tablet, Rfl: 1    methotrexate 2 5 MG tablet, 3 tablets by mouth once a week on Thursday, Disp:  , Rfl:     Multiple Vitamins-Minerals (CENTRUM SILVER PO), Take 1 tablet by mouth daily, Disp: , Rfl:     nortriptyline (PAMELOR) 50 mg capsule, TAKE 1 CAPSULE BY MOUTH AT BEDTIME, Disp: 90 capsule, Rfl: 0    predniSONE 5 mg tablet, Take 5 mg by mouth daily, Disp: , Rfl:     Psyllium (METAMUCIL FIBER PO), Take 1 packet by mouth 3 (three) times a day , Disp: , Rfl:     Vitamin E 400 units TABS, Take 400 Units by mouth daily  , Disp: , Rfl:     furosemide (LASIX) 20 mg tablet, Take 0 5 tablets (10 mg total) by mouth daily for 5 days, Disp: 3 tablet, Rfl: 0    oxyCODONE (ROXICODONE) 5 mg immediate release tablet, Take 1 tablet (5 mg total) by mouth every 4 (four) hours as needed for moderate painMax Daily Amount: 30 mg, Disp: 15 tablet, Rfl: 0    ALLERGIES:  No Known Allergies    REVIEW OF SYSTEMS:  Pertinent items are noted in HPI  A comprehensive review of systems was negative      LABS:  HgA1c:   Lab Results   Component Value Date    HGBA1C 5 3 05/11/2021     BMP:   Lab Results   Component Value Date    GLUCOSE 89 10/30/2014    CALCIUM 8 9 08/13/2021     (L) 10/30/2014    K 3 8 08/13/2021    CO2 30 08/13/2021     08/13/2021    BUN 16 08/13/2021    CREATININE 0 52 (L) 08/13/2021         _____________________________________________________  PHYSICAL EXAMINATION:  Vital signs: /69   Pulse 76   Ht 4' 10" (1 473 m)   BMI 20 73 kg/m²   General: well developed and well nourished, alert, oriented times 3 and appears comfortable  Psychiatric: Normal  HEENT: Trachea Midline, No torticollis  Cardiovascular: No discernable arrhythmia  Pulmonary: No wheezing or stridor  Abdomen: No rebound or guarding  Extremities: No peripheral edema  Skin: No masses, erythema, lacerations, fluctation, ulcerations  Neurovascular: Sensation intact to the Ulnar Nerve, Sensation Intact to the Radial Nerve, Decreased Sensation to  the Median Nerve, Motor Intact to the Median, Ulnar, Radial Nerve and Pulses Intact    MUSCULOSKELETAL EXAMINATION:  RIGHT SIDE:  Carpal tunnel:  Postive Tinel's, Positive Derkin's Compression Test and weakness APB 4-/5, mild thenar atrophy  Mild TTP over thumb CMC joint    _____________________________________________________  STUDIES REVIEWED:  EMG: from 2018 show moderate to severe Right sided carpal tunnel syndrome      PROCEDURES PERFORMED:  Hand/upper extremity injection: R carpal tunnel  [place] Protocol:  Consent: Verbal consent obtained  Risks and benefits: risks, benefits and alternatives were discussed  Consent given by: patient  Time out: Immediately prior to procedure a "time out" was called to verify the correct patient, procedure, equipment, support staff and site/side marked as required    Timeout called at: 8/25/2021 9:00 AM   Supporting Documentation  Indications: pain and therapeutic   Procedure Details  Condition:carpal tunnel syndrome Site: R carpal tunnel   Needle size: 25 G  Ultrasound guidance: no  Approach: volar  Medications administered: 1 mL lidocaine 1 %; 3 mg betamethasone acetate-betamethasone sodium phosphate 6 (3-3) mg/mL                  I interviewed, took the history and examined the patient  I discuss the case with the resident and reviewed the resident's note  I supervised the resident and I agree with the resident management plan as it was presented to me  I was present in the clinic and examined the patient

## 2021-08-25 NOTE — PROGRESS NOTES
Kallie Sandhu was instructed on a Median Nerve Glide HEP today  She verbalized understanding and all questions were answered to satisfaction  Kallie Sandhu was also administered an OTC WHO  Instructions on use were provided today as well

## 2021-08-31 ENCOUNTER — OFFICE VISIT (OUTPATIENT)
Dept: OBGYN CLINIC | Facility: HOSPITAL | Age: 76
End: 2021-08-31
Payer: MEDICARE

## 2021-08-31 VITALS
DIASTOLIC BLOOD PRESSURE: 73 MMHG | HEIGHT: 58 IN | WEIGHT: 99.3 LBS | SYSTOLIC BLOOD PRESSURE: 180 MMHG | HEART RATE: 76 BPM | BODY MASS INDEX: 20.84 KG/M2

## 2021-08-31 DIAGNOSIS — T84.84XA PAINFUL ORTHOPAEDIC HARDWARE (HCC): Primary | ICD-10-CM

## 2021-08-31 PROCEDURE — 99213 OFFICE O/P EST LOW 20 MIN: CPT | Performed by: ORTHOPAEDIC SURGERY

## 2021-08-31 NOTE — PROGRESS NOTES
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    Plan:     I reviewed again with patient and her family member who was present for last discussion, the glenoid base plate has failed given her traumatic injury and is amenable to removal however that would leave the patient with a resection arthroplasty of the shoulder and I do not feel that would be any more functional or pain relieving then no surgery at this time  This is a discussion that we have been consistent with since her injury and while I am willing to perform a resection arthroplasty of the failed glenoid base plate I do feel the risk of further surgery in this patient outweighs the benefit and I have counseled her to maximize nonoperative care in the form of medical pain management  Her family is in agreement with this and the patient does state she understands the decision process  Unfortunately the fall that caused failure of the base plate has put her in this position and as stated above I feel the risks of further surgery significantly outweigh any benefit of further surgery  She was offered opportunity for 2nd opinions regarding this but they did not wish to seek other opinions at this time      Assessment  Diagnoses and all orders for this visit:    Painful orthopaedic hardware [facility])      Discussion and Plan:    Discussed with patient at length options for additional treatment  Her primary treatment goal is pain control  Recommend continued follow up with pain management  Surgical options discussed include resection arthroplasty vs hemiarthroplasty which both carry high risks and will not improve her function signficiantly       Subjective:   Patient ID: Laura Huang is a 67 y o  female      Patient seen in ED 7/27/21 for pain in left arm and swelling, found to have dislocated left reverse total shoulder arthroplasty after known failure of left glenoid baseplate following a prior fall, but no dislocation of humeral component at that time  She presents for evaluation due to continued painful left reverse total shoulder after this dislocation event and would like to discuss treatment options  No new falls, no new skin wounds or bleeding noted, no fevers chills  The following portions of the patient's history were reviewed and updated as appropriate: allergies, current medications, past family history, past medical history, past social history, past surgical history and problem list     Review of Systems   Constitutional: Negative for chills and fever  Respiratory: Negative for shortness of breath  Cardiovascular: Negative for chest pain  Gastrointestinal: Negative for nausea and vomiting  Musculoskeletal: Positive for arthralgias (Bilateral shoulders)  Skin: Negative for rash and wound  Neurological: Negative for weakness and numbness  All other systems reviewed and are negative  Objective:  BP (!) 180/73   Pulse 76   Ht 4' 10" (1 473 m)   Wt 45 kg (99 lb 4 8 oz)   BMI 20 75 kg/m²       Right Shoulder Exam     Tenderness   The patient is experiencing no tenderness  Muscle Strength   The patient has normal right shoulder strength  Other   Erythema: absent  Scars: present  Sensation: normal  Pulse: present    Comments:  Palpable bony deformity right shoulder nontender    Decreased AROM and PROM right shoulder with mechanical block to FF/Abduction/ER      Left Shoulder Exam     Tenderness   The patient is experiencing no tenderness  Muscle Strength   The patient has normal left shoulder strength  Other   Erythema: absent  Scars: present  Sensation: normal  Pulse: present     Comments:  Decreased AROM and PROM left shoulder with mechanical block to FF, abduction and ER       Palpable deformity left shoulder            Physical Exam  Vitals reviewed  Constitutional:       Appearance: Normal appearance  HENT:      Head: Normocephalic and atraumatic  Eyes:      General: No scleral icterus  Conjunctiva/sclera: Conjunctivae normal    Cardiovascular:      Rate and Rhythm: Normal rate and regular rhythm  Pulses: Normal pulses  Skin:     General: Skin is dry  Neurological:      Mental Status: She is alert  Psychiatric:         Mood and Affect: Mood normal          Behavior: Behavior normal            I have personally reviewed pertinent films in PACS and my interpretation is as follows  Xray left shoulder AP and scap Y dated 7/27/2021 show interval dislocation of left reverse total shoulder arthroplasty after prior known glenoid baseplate fracture

## 2021-09-01 ENCOUNTER — PATIENT OUTREACH (OUTPATIENT)
Dept: INTERNAL MEDICINE CLINIC | Facility: CLINIC | Age: 76
End: 2021-09-01

## 2021-09-01 NOTE — PROGRESS NOTES
Outpatient Care Management Note:    Patient was in bundle episode (BPCI) from 6/1-8/31/21  Patient underwent a left reverse total shoulder arthroplasty  Patient was discharged to [facility] on 6/3/21 and discharged home on 6/15/21  Will close from outpatient nurse care management at this time as no further chronic conditions for ongoing care management  Patient did follow up with ortho office on 8/31  Please see note for additional information

## 2021-09-02 ENCOUNTER — EPISODE CHANGES (OUTPATIENT)
Dept: CASE MANAGEMENT | Facility: HOSPITAL | Age: 76
End: 2021-09-02

## 2021-09-06 DIAGNOSIS — M79.89 SWELLING OF LOWER EXTREMITY: ICD-10-CM

## 2021-09-08 DIAGNOSIS — G47.09 OTHER INSOMNIA: ICD-10-CM

## 2021-09-08 DIAGNOSIS — G89.4 CHRONIC PAIN SYNDROME: ICD-10-CM

## 2021-09-08 RX ORDER — GABAPENTIN 300 MG/1
300 CAPSULE ORAL 3 TIMES DAILY
Qty: 270 CAPSULE | Refills: 1 | OUTPATIENT
Start: 2021-09-08

## 2021-09-08 RX ORDER — FUROSEMIDE 20 MG/1
TABLET ORAL
Qty: 3 TABLET | Refills: 0 | Status: SHIPPED | OUTPATIENT
Start: 2021-09-08 | End: 2021-10-26

## 2021-09-08 NOTE — TELEPHONE ENCOUNTER
Received a fax from [address] [place] [address] requesting a new Rx for Gabapentin 300 mg capsule  I reviewed the patient's last office note- I do not see any mention that patient should continue on this medication  Please advise and refill if appropriate   Thank you

## 2021-09-09 ENCOUNTER — APPOINTMENT (OUTPATIENT)
Dept: LAB | Facility: HOSPITAL | Age: 76
End: 2021-09-09
Payer: MEDICARE

## 2021-09-09 DIAGNOSIS — M32.9 SYSTEMIC LUPUS ERYTHEMATOSUS, UNSPECIFIED SLE TYPE, UNSPECIFIED ORGAN INVOLVEMENT STATUS (HCC): ICD-10-CM

## 2021-09-09 DIAGNOSIS — R80.9 HEMATURIA WITH PROTEINURIA: ICD-10-CM

## 2021-09-09 DIAGNOSIS — N30.01 ACUTE CYSTITIS WITH HEMATURIA: ICD-10-CM

## 2021-09-09 DIAGNOSIS — R31.9 HEMATURIA WITH PROTEINURIA: ICD-10-CM

## 2021-09-09 LAB
ALBUMIN SERPL BCP-MCNC: 3.1 G/DL (ref 3.5–5)
ALP SERPL-CCNC: 112 U/L (ref 46–116)
ALT SERPL W P-5'-P-CCNC: 28 U/L (ref 12–78)
ANION GAP SERPL CALCULATED.3IONS-SCNC: 3 MMOL/L (ref 4–13)
AST SERPL W P-5'-P-CCNC: 19 U/L (ref 5–45)
BASOPHILS # BLD AUTO: 0.03 THOUSANDS/ΜL (ref 0–0.1)
BASOPHILS NFR BLD AUTO: 0 % (ref 0–1)
BILIRUB DIRECT SERPL-MCNC: 0.07 MG/DL (ref 0–0.2)
BILIRUB SERPL-MCNC: 0.34 MG/DL (ref 0.2–1)
BUN SERPL-MCNC: 12 MG/DL (ref 5–25)
CALCIUM SERPL-MCNC: 8.8 MG/DL (ref 8.3–10.1)
CHLORIDE SERPL-SCNC: 101 MMOL/L (ref 100–108)
CO2 SERPL-SCNC: 28 MMOL/L (ref 21–32)
CREAT SERPL-MCNC: 0.47 MG/DL (ref 0.6–1.3)
CRP SERPL QL: 9.7 MG/L
EOSINOPHIL # BLD AUTO: 0.06 THOUSAND/ΜL (ref 0–0.61)
EOSINOPHIL NFR BLD AUTO: 1 % (ref 0–6)
ERYTHROCYTE [DISTWIDTH] IN BLOOD BY AUTOMATED COUNT: 15.8 % (ref 11.6–15.1)
ERYTHROCYTE [SEDIMENTATION RATE] IN BLOOD: 19 MM/HOUR (ref 0–29)
GFR SERPL CREATININE-BSD FRML MDRD: 96 ML/MIN/1.73SQ M
GLUCOSE SERPL-MCNC: 83 MG/DL (ref 65–140)
HCT VFR BLD AUTO: 35.6 % (ref 34.8–46.1)
HGB BLD-MCNC: 11.1 G/DL (ref 11.5–15.4)
IMM GRANULOCYTES # BLD AUTO: 0.1 THOUSAND/UL (ref 0–0.2)
IMM GRANULOCYTES NFR BLD AUTO: 1 % (ref 0–2)
LYMPHOCYTES # BLD AUTO: 0.94 THOUSANDS/ΜL (ref 0.6–4.47)
LYMPHOCYTES NFR BLD AUTO: 9 % (ref 14–44)
MCH RBC QN AUTO: 28.4 PG (ref 26.8–34.3)
MCHC RBC AUTO-ENTMCNC: 31.2 G/DL (ref 31.4–37.4)
MCV RBC AUTO: 91 FL (ref 82–98)
MONOCYTES # BLD AUTO: 0.82 THOUSAND/ΜL (ref 0.17–1.22)
MONOCYTES NFR BLD AUTO: 8 % (ref 4–12)
NEUTROPHILS # BLD AUTO: 8.25 THOUSANDS/ΜL (ref 1.85–7.62)
NEUTS SEG NFR BLD AUTO: 81 % (ref 43–75)
NRBC BLD AUTO-RTO: 0 /100 WBCS
PLATELET # BLD AUTO: 342 THOUSANDS/UL (ref 149–390)
PMV BLD AUTO: 8.4 FL (ref 8.9–12.7)
POTASSIUM SERPL-SCNC: 4.5 MMOL/L (ref 3.5–5.3)
PROT SERPL-MCNC: 6.6 G/DL (ref 6.4–8.2)
RBC # BLD AUTO: 3.91 MILLION/UL (ref 3.81–5.12)
SODIUM SERPL-SCNC: 132 MMOL/L (ref 136–145)
WBC # BLD AUTO: 10.2 THOUSAND/UL (ref 4.31–10.16)

## 2021-09-09 PROCEDURE — 85652 RBC SED RATE AUTOMATED: CPT

## 2021-09-09 PROCEDURE — 86140 C-REACTIVE PROTEIN: CPT

## 2021-09-09 PROCEDURE — 80076 HEPATIC FUNCTION PANEL: CPT

## 2021-09-09 PROCEDURE — 87086 URINE CULTURE/COLONY COUNT: CPT

## 2021-09-09 PROCEDURE — 85025 COMPLETE CBC W/AUTO DIFF WBC: CPT

## 2021-09-09 PROCEDURE — 36415 COLL VENOUS BLD VENIPUNCTURE: CPT

## 2021-09-09 PROCEDURE — 80048 BASIC METABOLIC PNL TOTAL CA: CPT

## 2021-09-10 ENCOUNTER — TELEPHONE (OUTPATIENT)
Dept: INTERNAL MEDICINE CLINIC | Facility: CLINIC | Age: 76
End: 2021-09-10

## 2021-09-10 LAB — BACTERIA UR CULT: NORMAL

## 2021-09-16 NOTE — TELEPHONE ENCOUNTER
Patient called regarding her Gabapentin 300 mg 3x a day and Nortriptyline 50 mg needing refills of both meds

## 2021-09-17 RX ORDER — NORTRIPTYLINE HYDROCHLORIDE 50 MG/1
50 CAPSULE ORAL
Qty: 90 CAPSULE | Refills: 0 | Status: SHIPPED | OUTPATIENT
Start: 2021-09-17 | End: 2021-12-09

## 2021-09-17 NOTE — TELEPHONE ENCOUNTER
Can you advise why Gabapentin was refused so I can let patient know   Also can you fill Nortrityline if appropriate

## 2021-09-18 ENCOUNTER — HOSPITAL ENCOUNTER (EMERGENCY)
Facility: HOSPITAL | Age: 76
Discharge: HOME | End: 2021-09-18
Attending: EMERGENCY MEDICINE
Payer: MEDICARE

## 2021-09-18 ENCOUNTER — APPOINTMENT (EMERGENCY)
Dept: NON INVASIVE DIAGNOSTICS | Facility: HOSPITAL | Age: 76
End: 2021-09-18
Payer: MEDICARE

## 2021-09-18 VITALS
SYSTOLIC BLOOD PRESSURE: 123 MMHG | RESPIRATION RATE: 18 BRPM | HEART RATE: 70 BPM | OXYGEN SATURATION: 98 % | TEMPERATURE: 98.4 F | DIASTOLIC BLOOD PRESSURE: 58 MMHG

## 2021-09-18 DIAGNOSIS — G25.81 RESTLESS LEGS SYNDROME: ICD-10-CM

## 2021-09-18 DIAGNOSIS — R60.0 BILATERAL LOWER EXTREMITY EDEMA: Primary | ICD-10-CM

## 2021-09-18 LAB
ALBUMIN SERPL BCP-MCNC: 3.1 G/DL (ref 3.5–5)
ALP SERPL-CCNC: 117 U/L (ref 46–116)
ALT SERPL W P-5'-P-CCNC: 32 U/L (ref 12–78)
ANION GAP SERPL CALCULATED.3IONS-SCNC: 4 MMOL/L (ref 4–13)
AST SERPL W P-5'-P-CCNC: 50 U/L (ref 5–45)
BACTERIA UR QL AUTO: ABNORMAL /HPF
BASOPHILS # BLD AUTO: 0.01 THOUSANDS/ΜL (ref 0–0.1)
BASOPHILS NFR BLD AUTO: 0 % (ref 0–1)
BILIRUB SERPL-MCNC: 0.57 MG/DL (ref 0.2–1)
BILIRUB UR QL STRIP: NEGATIVE
BUN SERPL-MCNC: 17 MG/DL (ref 5–25)
CALCIUM ALBUM COR SERPL-MCNC: 8.8 MG/DL (ref 8.3–10.1)
CALCIUM SERPL-MCNC: 8.1 MG/DL (ref 8.3–10.1)
CHLORIDE SERPL-SCNC: 105 MMOL/L (ref 100–108)
CK MB SERPL-MCNC: 1.9 % (ref 0–2.5)
CK MB SERPL-MCNC: 5.2 NG/ML (ref 0–5)
CK SERPL-CCNC: 274 U/L (ref 26–192)
CLARITY UR: CLEAR
CO2 SERPL-SCNC: 25 MMOL/L (ref 21–32)
COLOR UR: YELLOW
CREAT SERPL-MCNC: 0.52 MG/DL (ref 0.6–1.3)
CRP SERPL QL: 13.4 MG/L
EOSINOPHIL # BLD AUTO: 0.02 THOUSAND/ΜL (ref 0–0.61)
EOSINOPHIL NFR BLD AUTO: 0 % (ref 0–6)
ERYTHROCYTE [DISTWIDTH] IN BLOOD BY AUTOMATED COUNT: 16 % (ref 11.6–15.1)
ERYTHROCYTE [SEDIMENTATION RATE] IN BLOOD: 23 MM/HOUR (ref 0–29)
GFR SERPL CREATININE-BSD FRML MDRD: 92 ML/MIN/1.73SQ M
GLUCOSE SERPL-MCNC: 74 MG/DL (ref 65–140)
GLUCOSE UR STRIP-MCNC: NEGATIVE MG/DL
HCT VFR BLD AUTO: 37 % (ref 34.8–46.1)
HGB BLD-MCNC: 11.4 G/DL (ref 11.5–15.4)
HGB UR QL STRIP.AUTO: ABNORMAL
HYALINE CASTS #/AREA URNS LPF: ABNORMAL /LPF
IMM GRANULOCYTES # BLD AUTO: 0.03 THOUSAND/UL (ref 0–0.2)
IMM GRANULOCYTES NFR BLD AUTO: 1 % (ref 0–2)
KETONES UR STRIP-MCNC: ABNORMAL MG/DL
LEUKOCYTE ESTERASE UR QL STRIP: ABNORMAL
LYMPHOCYTES # BLD AUTO: 0.95 THOUSANDS/ΜL (ref 0.6–4.47)
LYMPHOCYTES NFR BLD AUTO: 16 % (ref 14–44)
MCH RBC QN AUTO: 28 PG (ref 26.8–34.3)
MCHC RBC AUTO-ENTMCNC: 30.8 G/DL (ref 31.4–37.4)
MCV RBC AUTO: 91 FL (ref 82–98)
MONOCYTES # BLD AUTO: 0.61 THOUSAND/ΜL (ref 0.17–1.22)
MONOCYTES NFR BLD AUTO: 10 % (ref 4–12)
NEUTROPHILS # BLD AUTO: 4.47 THOUSANDS/ΜL (ref 1.85–7.62)
NEUTS SEG NFR BLD AUTO: 73 % (ref 43–75)
NITRITE UR QL STRIP: NEGATIVE
NON-SQ EPI CELLS URNS QL MICRO: ABNORMAL /HPF
NRBC BLD AUTO-RTO: 0 /100 WBCS
NT-PROBNP SERPL-MCNC: 82 PG/ML
PH UR STRIP.AUTO: 7.5 [PH]
PLATELET # BLD AUTO: 350 THOUSANDS/UL (ref 149–390)
PMV BLD AUTO: 8.1 FL (ref 8.9–12.7)
POTASSIUM SERPL-SCNC: 4.8 MMOL/L (ref 3.5–5.3)
PROT SERPL-MCNC: 6.7 G/DL (ref 6.4–8.2)
PROT UR STRIP-MCNC: ABNORMAL MG/DL
RBC # BLD AUTO: 4.07 MILLION/UL (ref 3.81–5.12)
RBC #/AREA URNS AUTO: ABNORMAL /HPF
SODIUM SERPL-SCNC: 134 MMOL/L (ref 136–145)
SP GR UR STRIP.AUTO: 1.01 (ref 1–1.03)
UROBILINOGEN UR QL STRIP.AUTO: 0.2 E.U./DL
WBC # BLD AUTO: 6.09 THOUSAND/UL (ref 4.31–10.16)
WBC #/AREA URNS AUTO: ABNORMAL /HPF

## 2021-09-18 PROCEDURE — 85652 RBC SED RATE AUTOMATED: CPT | Performed by: EMERGENCY MEDICINE

## 2021-09-18 PROCEDURE — 82550 ASSAY OF CK (CPK): CPT | Performed by: EMERGENCY MEDICINE

## 2021-09-18 PROCEDURE — 99285 EMERGENCY DEPT VISIT HI MDM: CPT | Performed by: EMERGENCY MEDICINE

## 2021-09-18 PROCEDURE — 93005 ELECTROCARDIOGRAM TRACING: CPT

## 2021-09-18 PROCEDURE — 81001 URINALYSIS AUTO W/SCOPE: CPT | Performed by: EMERGENCY MEDICINE

## 2021-09-18 PROCEDURE — 36415 COLL VENOUS BLD VENIPUNCTURE: CPT | Performed by: EMERGENCY MEDICINE

## 2021-09-18 PROCEDURE — 87077 CULTURE AEROBIC IDENTIFY: CPT | Performed by: EMERGENCY MEDICINE

## 2021-09-18 PROCEDURE — 99284 EMERGENCY DEPT VISIT MOD MDM: CPT

## 2021-09-18 PROCEDURE — 82553 CREATINE MB FRACTION: CPT | Performed by: EMERGENCY MEDICINE

## 2021-09-18 PROCEDURE — 80053 COMPREHEN METABOLIC PANEL: CPT | Performed by: EMERGENCY MEDICINE

## 2021-09-18 PROCEDURE — 83880 ASSAY OF NATRIURETIC PEPTIDE: CPT | Performed by: EMERGENCY MEDICINE

## 2021-09-18 PROCEDURE — 86140 C-REACTIVE PROTEIN: CPT | Performed by: EMERGENCY MEDICINE

## 2021-09-18 PROCEDURE — 85025 COMPLETE CBC W/AUTO DIFF WBC: CPT | Performed by: EMERGENCY MEDICINE

## 2021-09-18 PROCEDURE — 93970 EXTREMITY STUDY: CPT

## 2021-09-18 PROCEDURE — 87186 SC STD MICRODIL/AGAR DIL: CPT | Performed by: EMERGENCY MEDICINE

## 2021-09-18 PROCEDURE — 87086 URINE CULTURE/COLONY COUNT: CPT | Performed by: EMERGENCY MEDICINE

## 2021-09-18 RX ORDER — PREDNISONE 20 MG/1
20 TABLET ORAL DAILY
Qty: 3 TABLET | Refills: 0 | Status: SHIPPED | OUTPATIENT
Start: 2021-09-18 | End: 2021-09-21

## 2021-09-18 RX ORDER — ROPINIROLE 1 MG/1
1 TABLET, FILM COATED ORAL
Qty: 10 TABLET | Refills: 0 | Status: SHIPPED | OUTPATIENT
Start: 2021-09-18 | End: 2021-10-20 | Stop reason: SDUPTHER

## 2021-09-18 RX ORDER — ROPINIROLE 1 MG/1
1 TABLET, FILM COATED ORAL ONCE
Status: COMPLETED | OUTPATIENT
Start: 2021-09-18 | End: 2021-09-18

## 2021-09-18 RX ORDER — OXYCODONE HYDROCHLORIDE 5 MG/1
5 TABLET ORAL ONCE
Status: COMPLETED | OUTPATIENT
Start: 2021-09-18 | End: 2021-09-18

## 2021-09-18 RX ORDER — GABAPENTIN 300 MG/1
300 CAPSULE ORAL ONCE
Status: COMPLETED | OUTPATIENT
Start: 2021-09-18 | End: 2021-09-18

## 2021-09-18 RX ORDER — ROPINIROLE 0.25 MG/1
0.5 TABLET, FILM COATED ORAL ONCE
Status: DISCONTINUED | OUTPATIENT
Start: 2021-09-18 | End: 2021-09-18

## 2021-09-18 RX ADMIN — OXYCODONE HYDROCHLORIDE 5 MG: 5 TABLET ORAL at 09:56

## 2021-09-18 RX ADMIN — ROPINIROLE HYDROCHLORIDE 1 MG: 1 TABLET, FILM COATED ORAL at 13:00

## 2021-09-18 RX ADMIN — GABAPENTIN 300 MG: 300 CAPSULE ORAL at 09:44

## 2021-09-18 NOTE — ED PROVIDER NOTES
History  Chief Complaint   Patient presents with    Leg Swelling     B/L leg swelling, redness and pain  HPI     74-year-old female with past medical history of lupus, rheumatoid arthritis and restless leg syndrome who presents for evaluation of bilateral lower extremity edema  Patient states she has had two weeks of increasing leg swelling, redness and pain  Denies fevers or chills  Denies chest pain or shortness of breath  Patient states she has never has swelling in the past  She does have restless leg syndrome but does not take medications for that  Patient was also put on lasix for a little while but she states she does not take lasix anymore  Patient denies any recent surgeries, hospitalizations or prolonged immobilization  Denies history of DVT or PE  Prior to Admission Medications   Prescriptions Last Dose Informant Patient Reported? Taking?    Ascorbic Acid, Vitamin C, (VITAMIN C) 100 MG tablet   Yes No   Sig: Take 400 mg by mouth daily   Multiple Vitamins-Minerals (CENTRUM SILVER PO)  Self Yes No   Sig: Take 1 tablet by mouth daily   Psyllium (METAMUCIL FIBER PO)  Self Yes No   Sig: Take 1 packet by mouth 3 (three) times a day    Vitamin E 400 units TABS  Self Yes No   Sig: Take 400 Units by mouth daily     docusate sodium (COLACE) 100 mg capsule   No No   Sig: Take 1 capsule (100 mg total) by mouth 2 (two) times a day   furosemide (LASIX) 20 mg tablet   No No   Sig: TAKE 1/2 TABLET BY MOUTH DAILY FOR 5 DAYS   gabapentin (NEURONTIN) 300 mg capsule   No No   Sig: TAKE ONE CAPSULE BY MOUTH THREE TIMES A DAY   hydroxychloroquine (PLAQUENIL) 200 mg tablet  Self Yes No   Si pill am 1/2 pill pm   levothyroxine 25 mcg tablet   No No   Sig: TAKE 1 TABLET BY MOUTH EVERY DAY   methotrexate 2 5 MG tablet  Self No No   Sig: 3 tablets by mouth once a week on Thursday   nortriptyline (PAMELOR) 50 mg capsule   No No   Sig: Take 1 capsule (50 mg total) by mouth daily at bedtime   oxyCODONE (ROXICODONE) 5 mg immediate release tablet   No No   Sig: Take 1 tablet (5 mg total) by mouth every 4 (four) hours as needed for moderate painMax Daily Amount: 30 mg   predniSONE 5 mg tablet  Self Yes No   Sig: Take 5 mg by mouth daily      Facility-Administered Medications: None       Past Medical History:   Diagnosis Date    Acute blood loss anemia 9/9/2020    Aftercare following joint replacement 9/9/2020    Patient had right reversed total shoulder arthroplasty   Pain was controlled when he left the hospital       Anxiety     Arthritis     Depression     Disease of thyroid gland     HYPO    Femur neck fracture (HCC)     GERD (gastroesophageal reflux disease)     Hyperthyroidism     RESOLVED: [id number]    Osteoporosis     Polio     PVD [facility]peripheral vascular disease) ([facility] [address] )     Right BBB/left ant fasc block     UTI (urinary tract infection)     Varicella infection     LAST ASSESSED: [id number]       Past Surgical History:   Procedure Laterality Date    APPENDECTOMY      HIP ARTHROPLASTY Left 11/27/2017    Procedure: REMOVAL IM NAIL CONVERSION TO TOTAL HIP ARTHROPLASTY POSTERIOR APPROACH;  Surgeon: Cynthia Thomas MD;  [facility];  Service: Orthopedics    HIP FRACTURE SURGERY      HIP SURGERY      both hips replaced;2013 left ,2014 right    JOINT REPLACEMENT Right     HIP TOTAL    AL CONV PREV HIP SURG TO TOT HIP Gianfranco Aloe Left 10/4/2017    Procedure: Zane Mettle removal of left hip;  Surgeon: Cynthia Thomas MD;  [facility];  Service: Orthopedics    AL RECONSTR TOTAL SHOULDER IMPLANT Right 9/2/2020    Procedure: ARTHROPLASTY SHOULDER REVERSE;  Surgeon: Tasha Zheng MD;  [facility];  Service: Orthopedics    AL RECONSTR TOTAL SHOULDER IMPLANT Left 6/1/2021    Procedure: ARTHROPLASTY SHOULDER REVERSE;  Surgeon: Tasha Zheng MD;  [facility];  Service: Orthopedics    REVISION TOTAL HIP ARTHROPLASTY Right     TONSILLECTOMY         Family History   Problem Relation Age of Onset    Rheum arthritis Mother     Rheum arthritis Sister     Prostate cancer Brother      I have reviewed and agree with the history as documented  E-Cigarette/Vaping    E-Cigarette Use Never User      E-Cigarette/Vaping Substances    Nicotine No     THC No     CBD No     Flavoring No     Other No     Unknown No      Social History     Tobacco Use    Smoking status: Former Smoker    Smokeless tobacco: Never Used    Tobacco comment: quit 20-30 years ago   Vaping Use    Vaping Use: Never used   Substance Use Topics    Alcohol use: Not Currently    Drug use: Not Currently        Review of Systems   Constitutional: Negative for appetite change, chills and fever  HENT: Negative for congestion, rhinorrhea and sore throat  Respiratory: Negative for cough and shortness of breath  Cardiovascular: Positive for leg swelling  Negative for chest pain  Gastrointestinal: Negative for abdominal pain, diarrhea, nausea and vomiting  Musculoskeletal: Positive for myalgias  Negative for arthralgias  Skin: Negative for color change and rash  Neurological: Negative for dizziness, weakness, light-headedness, numbness and headaches  All other systems reviewed and are negative  Physical Exam  ED Triage Vitals   Temperature Pulse Respirations Blood Pressure SpO2   09/18/21 0827 09/18/21 0816 09/18/21 0816 09/18/21 0816 09/18/21 0816   98 4 °F (36 9 °C) 80 18 167/88 93 %      Temp src Heart Rate Source Patient Position - Orthostatic VS BP Location FiO2 (%)   -- -- -- -- --             Pain Score       09/18/21 0816       5             Orthostatic Vital Signs  Vitals:    09/18/21 0816 09/18/21 1100   BP: 167/88 158/88   Pulse: 80 78       Physical Exam  Vitals and nursing note reviewed  Constitutional:       General: She is not in acute distress  Appearance: Normal appearance  She is well-developed and normal weight  She is not ill-appearing, toxic-appearing or diaphoretic     HENT:      Head: Normocephalic and atraumatic  Mouth/Throat:      Mouth: Mucous membranes are moist       Pharynx: Oropharynx is clear  Eyes:      Conjunctiva/sclera: Conjunctivae normal       Pupils: Pupils are equal, round, and reactive to light  Cardiovascular:      Rate and Rhythm: Normal rate and regular rhythm  Pulses: Normal pulses  Dorsalis pedis pulses are 2+ on the right side and 2+ on the left side  Posterior tibial pulses are 2+ on the right side and 2+ on the left side  Heart sounds: Normal heart sounds  No murmur heard  No friction rub  No gallop  Pulmonary:      Effort: Pulmonary effort is normal  No respiratory distress  Breath sounds: Normal breath sounds  No wheezing or rales  Abdominal:      General: Bowel sounds are normal  There is no distension  Palpations: Abdomen is soft  Tenderness: There is no abdominal tenderness  There is no guarding or rebound  Musculoskeletal:         General: Tenderness present  Cervical back: Neck supple  Right lower leg: Edema present  Left lower leg: Edema present  Comments: Patient is continuously moving her legs  She has 2+ pitting edema bilaterally up to the knees  Legs are red with mild tenderness  Mild serous discharge from several open wounds  Feet and legs are warm and well perfused  Skin:     General: Skin is warm and dry  Coloration: Skin is not pale  Findings: No erythema or rash  Neurological:      General: No focal deficit present  Mental Status: She is alert and oriented to person, place, and time  Cranial Nerves: No cranial nerve deficit  Sensory: No sensory deficit  Motor: No weakness     Psychiatric:         Mood and Affect: Mood normal          Behavior: Behavior normal          ED Medications  Medications   rOPINIRole (REQUIP) tablet 1 mg (has no administration in time range)   oxyCODONE (ROXICODONE) IR tablet 5 mg (5 mg Oral Given 9/18/21 0956) gabapentin (NEURONTIN) capsule 300 mg (300 mg Oral Given 9/18/21 0944)       Diagnostic Studies  Results Reviewed     Procedure Component Value Units Date/Time    C-reactive protein [[id number]]  (Abnormal) Collected: 09/18/21 0944    Lab Status: Final result Specimen: Blood from Arm, Right Updated: 09/18/21 1246     CRP 13 4 mg/L     Sedimentation rate, automated [[id number]]  (Normal) Collected: 09/18/21 0944    Lab Status: Final result Specimen: Blood from Arm, Right Updated: 09/18/21 1246     Sed Rate 23 mm/hour     NT-BNP PRO [[id number]]  (Normal) Collected: 09/18/21 0944    Lab Status: Final result Specimen: Blood from Arm, Right Updated: 09/18/21 1011     NT-proBNP 82 pg/mL     Comprehensive metabolic panel [[id number]]  (Abnormal) Collected: 09/18/21 0944    Lab Status: Final result Specimen: Blood from Arm, Right Updated: 09/18/21 1011     Sodium 134 mmol/L      Potassium 4 8 mmol/L      Chloride 105 mmol/L      CO2 25 mmol/L      ANION GAP 4 mmol/L      BUN 17 mg/dL      Creatinine 0 52 mg/dL      Glucose 74 mg/dL      Calcium 8 1 mg/dL      Corrected Calcium 8 8 mg/dL      AST 50 U/L      ALT 32 U/L      Alkaline Phosphatase 117 U/L      Total Protein 6 7 g/dL      Albumin 3 1 g/dL      Total Bilirubin 0 57 mg/dL      eGFR 92 ml/min/1 73sq m     Narrative:      [place] guidelines for Chronic Kidney Disease (CKD):     Stage 1 with normal or high GFR (GFR > 90 mL/min/1 73 square meters)    Stage 2 Mild CKD (GFR = 60-89 mL/min/1 73 square meters)    Stage 3A Moderate CKD (GFR = 45-59 mL/min/1 73 square meters)    Stage 3B Moderate CKD (GFR = 30-44 mL/min/1 73 square meters)    Stage 4 Severe CKD (GFR = 15-29 mL/min/1 73 square meters)    Stage 5 End Stage CKD (GFR <15 mL/min/1 73 square meters)  Note: GFR calculation is accurate only with a steady state creatinine    CBC and differential [[id number]]  (Abnormal) Collected: 09/18/21 0944    Lab Status: Final result Specimen: Blood from Arm, Right Updated: 09/18/21 1005     WBC 6 09 Thousand/uL      RBC 4 07 Million/uL      Hemoglobin 11 4 g/dL      Hematocrit 37 0 %      MCV 91 fL      MCH 28 0 pg      MCHC 30 8 g/dL      RDW 16 0 %      MPV 8 1 fL      Platelets 165 Thousands/uL      nRBC 0 /100 WBCs      Neutrophils Relative 73 %      Immat GRANS % 1 %      Lymphocytes Relative 16 %      Monocytes Relative 10 %      Eosinophils Relative 0 %      Basophils Relative 0 %      Neutrophils Absolute 4 47 Thousands/µL      Immature Grans Absolute 0 03 Thousand/uL      Lymphocytes Absolute 0 95 Thousands/µL      Monocytes Absolute 0 61 Thousand/µL      Eosinophils Absolute 0 02 Thousand/µL      Basophils Absolute 0 01 Thousands/µL     UA w Reflex to Microscopic w Reflex to Culture [[id number]]     Lab Status: No result Specimen: Urine                  No orders to display         Procedures  ECG 12 Lead Documentation Only    Date/Time: 9/18/2021 11:02 PM  Performed by: Deysi Rawls MD  Authorized by: Deysi Rawls MD     Indications / Diagnosis:  Leg swelling  ECG reviewed by me, the ED Provider: yes    Patient location:  ED  Previous ECG:     Previous ECG:  Compared to current    Similarity:  No change    Comparison to cardiac monitor: Yes    Interpretation:     Interpretation: abnormal    Rate:     ECG rate:  86    ECG rate assessment: normal    Rhythm:     Rhythm: sinus rhythm    Ectopy:     Ectopy: none    QRS:     QRS axis:  Normal    QRS intervals:  Normal  Conduction:     Conduction: abnormal      Abnormal conduction: complete RBBB    ST segments:     ST segments:  Normal  T waves:     T waves: normal            ED Course               Identification of Seniors at Risk      Most Recent Value   (ISAR) Identification of Seniors at Risk   Before the illness or injury that brought you to the Emergency, did you need someone to help you on a regular basis?   0 Filed at: 09/18/2021 0817   In the last 24 hours, have you needed more help than usual?  1 Filed at: 09/18/2021 4757   Have you been hospitalized for one or more nights during the past 6 months? 1 Filed at: 09/18/2021 0817   In general, do you see well?  0 Filed at: 09/18/2021 0817   In general, do you have serious problems with your memory? 0 Filed at: 09/18/2021 2770   Do you take more than three different medications every day? 1 Filed at: 09/18/2021 0817   ISAR Score  3 Filed at: 09/18/2021 5939                              [facility]     53-year-old female with past medical history of lupus, rheumatoid arthritis and restless leg syndrome who presents for evaluation of bilateral lower extremity edema, redness and pain for the past two weeks  Differential diagnosis includes: renal disease, heart failure, hepatic failure  Low suspicion for DVT or cellulitis   Will check CBC, CMP, BNP, EKG and UA  Will give gabapentin and home oxycodone for pain  Reviewed labs, no marked abnormalities  Will discuss with rheumatology for recommendations and for follow up in office  Discussed with rheumatology, will check inflammatory markers, CK to check for myositis and duplex to rule out DVT as patient has lupus which puts her at higher risk of clots  If workup is negative, will increase prednisone to 20 mg for three days and then back down to 5 mg daily and have patient follow up with rheumatology  Duplex negative for DVT  CRP mildly elevated, ESR normal  CK normal  Will also start patient on ropinorole for RLS  Discussed with patient strict return precautions  Patient expressed understanding and was agreeable for discharge       Disposition  Final diagnoses:   Bilateral lower extremity edema   Restless legs syndrome     Time reflects when diagnosis was documented in both MDM as applicable and the Disposition within this note     Time User Action Codes Description Comment    9/18/2021 11:23 AM Ginny Moore Add [R60 0] Bilateral lower extremity edema     9/18/2021 11:23 AM Ginny Orozco [G25 81] Restless legs syndrome       ED Disposition     None      Follow-up Information     Follow up With Specialties Details Why Contact Info    Rafael Montemayor PA-C Internal Medicine, Physician Assistant Schedule an appointment as soon as possible for a visit in 3 days  [address]  [phone]            Patient's Medications   Discharge Prescriptions    No medications on file     No discharge procedures on file  PDMP Review       Value Time User    PDMP Reviewed  Yes 6/3/2021 12:26 PM Anju Archibald MD           ED Provider  Attending physically available and evaluated Geetha Rivera I managed the patient along with the ED Attending      Electronically Signed by         Overton Klinefelter, MD  09/19/21 9957

## 2021-09-18 NOTE — ED ATTENDING ATTESTATION
9/18/2021  I saw and evaluated the patient  I have discussed the patient with the resident physician and agree with the resident's findings, assessment and plan as documented in the resident physician's note, unless otherwise documented below  All available laboratory and imaging studies were reviewed by myself  I was present for key portions of any procedure(s) performed by the resident and I was immediately available to provide assistance  I agree with the current assessment done in the Emergency Department  I have conducted an independent evaluation of this patient  Chief Complaint   Patient presents with    Leg Swelling     B/L leg swelling, redness and pain  Daniella Gardner is a 67 y o  female with PMH of RA, lupus and RLS presenting with pain and swelling and redness of both legs  Over the past two weeks, patient had progressively worsening redness of both legs up to knee, with worsening restless legs symptoms (pain, continuous movement), as well as swelling  She has been on a brief course of lasix but it did not help  No fevers or chills  No swollen joints  No rashes anywhere other than redness to both legs  No history of DVT or PE  Patient is presenting today due to significant pain  She has not had any trauma or overuse to her legs  She follows with Dr Samira Grijalva in Rheumatology       REVIEW OF SYSTEMS    Constitutional: denies fevers, chills  Visual/Eyes: no changes in vision  HENT: no rhinorrhea, no sore throat  Cardiac: no chest pain  Respiratory: no shortness of breath, no cough  GI: no abdominal pain, nausea, vomiting, or diarrhea  : no burning with urination  Heme/Onc: no easy bruising  MSK: As above  Rheum: RA and lupus   Endocrine: no diabetes  Neuro: no focal weakness or numbness, no headaches    Ten systems reviewed and negative unless otherwise noted in HPI and above    PAST MEDICAL HISTORY  Past Medical History:   Diagnosis Date    Acute blood loss anemia 9/9/2020    Aftercare following joint replacement 9/9/2020    Patient had right reversed total shoulder arthroplasty  Pain was controlled when he left the hospital       Anxiety     Arthritis     Depression     Disease of thyroid gland     HYPO    Femur neck fracture (HCC)     GERD (gastroesophageal reflux disease)     Hyperthyroidism     RESOLVED: [id number]    Osteoporosis     Polio     PVD (peripheral vascular disease) ([facility] [address] )     Right BBB/left ant fasc block     UTI (urinary tract infection)     Varicella infection     LAST ASSESSED: [id number]       SURGICAL HISTORY  Past Surgical History:   Procedure Laterality Date    APPENDECTOMY      HIP ARTHROPLASTY Left 11/27/2017    Procedure: REMOVAL IM NAIL CONVERSION TO TOTAL HIP ARTHROPLASTY POSTERIOR APPROACH;  Surgeon: Kenneth Evans MD;  [facility];  Service: Orthopedics    HIP FRACTURE SURGERY      HIP SURGERY      both hips replaced;2013 left ,2014 right    JOINT REPLACEMENT Right     HIP TOTAL    ME CONV PREV HIP SURG TO TOT HIP Sharron Abu Left 10/4/2017    Procedure: Dulcy Staple removal of left hip;  Surgeon: Kenneth Evans MD;  [facility];  Service: Orthopedics    ME RECONSTR TOTAL SHOULDER IMPLANT Right 9/2/2020    Procedure: ARTHROPLASTY SHOULDER REVERSE;  Surgeon: Sidney Bajwa MD;  [facility];  Service: Orthopedics    ME RECONSTR TOTAL SHOULDER IMPLANT Left 6/1/2021    Procedure: ARTHROPLASTY SHOULDER REVERSE;  Surgeon: Sidney Bajwa MD;  [facility];  Service: Orthopedics    REVISION TOTAL HIP ARTHROPLASTY Right     TONSILLECTOMY         FAMILY HISTORY  Family History   Problem Relation Age of Onset    Rheum arthritis Mother     Rheum arthritis Sister     Prostate cancer Brother         CURRENT MEDICATIONS  No current facility-administered medications on file prior to encounter       Current Outpatient Medications on File Prior to Encounter   Medication Sig    Ascorbic Acid, Vitamin C, (VITAMIN C) 100 MG tablet Take 400 mg by mouth daily    docusate sodium (COLACE) 100 mg capsule Take 1 capsule (100 mg total) by mouth 2 (two) times a day    furosemide (LASIX) 20 mg tablet TAKE 1/2 TABLET BY MOUTH DAILY FOR 5 DAYS    gabapentin (NEURONTIN) 300 mg capsule TAKE ONE CAPSULE BY MOUTH THREE TIMES A DAY    hydroxychloroquine (PLAQUENIL) 200 mg tablet 1 pill am 1/2 pill pm    levothyroxine 25 mcg tablet TAKE 1 TABLET BY MOUTH EVERY DAY    methotrexate 2 5 MG tablet 3 tablets by mouth once a week on Thursday    Multiple Vitamins-Minerals (CENTRUM SILVER PO) Take 1 tablet by mouth daily    nortriptyline (PAMELOR) 50 mg capsule Take 1 capsule (50 mg total) by mouth daily at bedtime    oxyCODONE (ROXICODONE) 5 mg immediate release tablet Take 1 tablet (5 mg total) by mouth every 4 (four) hours as needed for moderate painMax Daily Amount: 30 mg    predniSONE 5 mg tablet Take 5 mg by mouth daily    Psyllium (METAMUCIL FIBER PO) Take 1 packet by mouth 3 (three) times a day     Vitamin E 400 units TABS Take 400 Units by mouth daily         ALLERGIES  No Known Allergies    SOCIAL HISTORY  Social History     Socioeconomic History    Marital status:      Spouse name: None    Number of children: 1    Years of education: None    Highest education level: None   Occupational History    Occupation: RETIRED    Tobacco Use    Smoking status: Former Smoker    Smokeless tobacco: Never Used    Tobacco comment: quit 20-30 years ago   Vaping Use    Vaping Use: Never used   Substance and Sexual Activity    Alcohol use: Not Currently    Drug use: Not Currently    Sexual activity: Not Currently   Other Topics Concern    None   Social History Narrative    Always uses seat belt    Caffeine use    Restorationism    Single as per all scripts      Social Determinants of Health     Financial Resource Strain: Low Risk     Difficulty of Paying Living Expenses: Not hard at all   Food Insecurity: No Food Insecurity    Worried About [facility] in the Last Year: Never true    Scarlett of Food in the Last Year: Never true   Transportation Needs: No Transportation Needs    Lack of Transportation (Medical): No    Lack of Transportation (Non-Medical): No   Physical Activity: Inactive    Days of Exercise per Week: 0 days    Minutes of Exercise per Session: 0 min   Stress: No Stress Concern Present    Feeling of Stress : Not at all   Social Connections: Socially Isolated    Frequency of Communication with Friends and Family: Three times a week    Frequency of Social Gatherings with Friends and Family: Twice a week    Attends Tenriism Services: Never    Active Member of Clubs or Organizations: No    Attends Club or Organization Meetings: Never    Marital Status:    Intimate Partner Violence: Not At Risk    Fear of Current or Ex-Partner: No    Emotionally Abused: No    Physically Abused: No    Sexually Abused: No       PHYSICAL EXAM  /88   Pulse 80   Temp 98 4 °F (36 9 °C)   Resp 18   SpO2 93%   Vital signs and nursing notes reviewed    CONSTITUTIONAL: female appearing stated age resting in bed, in no acute distress  HEENT: atraumatic, normocephalic  Sclera anicteric, conjunctiva are not injected  Moist oral mucosa  CARDIOVASCULAR/CHEST: RRR, no M/R/G  2+ radial pulses  PULMONARY: Breathing comfortably on RA  Breath sounds are equal and clear to auscultation  ABDOMEN: non-distended  BS present, normoactive  Non-tender  MSK: Bilateral lower extremities are with 2+ pitting edema up to knees  There is serous drainage from several small wounds to anterior shins  Skin is of trauma as wounds erythematous  Legs are not warm to touch  Legs and feet are well perfused, patient has 2+ dorsalis pedis and posterior tibial pulses bilaterally  Patient has continuous bilateral lower extremity movement  NEURO: Awake, alert, and oriented x 3  Face symmetric  Moves all extremities spontaneously   No focal neurologic deficits  SKIN: Warm, appears well-perfused  MENTAL STATUS: Normal affect        DIAGNOSTIC STUDIES  Results Reviewed     Procedure Component Value Units Date/Time    CKMB [[id number]]  (Abnormal) Collected: 09/18/21 0944    Lab Status: Final result Specimen: Blood from Arm, Right Updated: 09/18/21 1443     CK-MB Index 1 9 %      CK-MB 5 2 ng/mL     CK (with reflex to MB) [[id number]]  (Abnormal) Collected: 09/18/21 0944    Lab Status: Final result Specimen: Blood from Arm, Right Updated: 09/18/21 1427     Total  U/L     Urine Microscopic [[id number]]  (Abnormal) Collected: 09/18/21 1343    Lab Status: Final result Specimen: Urine, Clean Catch Updated: 09/18/21 1417     RBC, UA Innumerable /hpf      WBC, UA Innumerable /hpf      Epithelial Cells None Seen /hpf      Bacteria, UA None Seen /hpf      Hyaline Casts, UA None Seen /lpf     Urine culture [[id number]] Collected: 09/18/21 1343    Lab Status:  In process Specimen: Urine, Clean Catch Updated: 09/18/21 1417    UA w Reflex to Microscopic w Reflex to Culture [[id number]]  (Abnormal) Collected: 09/18/21 1343    Lab Status: Final result Specimen: Urine, Clean Catch Updated: 09/18/21 1414     Color, UA Yellow     Clarity, UA Clear     Specific Gravity, UA 1 007     pH, UA 7 5     Leukocytes, UA Large     Nitrite, UA Negative     Protein, UA 30 (1+) mg/dl      Glucose, UA Negative mg/dl      Ketones, UA 40 (2+) mg/dl      Urobilinogen, UA 0 2 E U /dl      Bilirubin, UA Negative     Blood, UA Large    C-reactive protein [[id number]]  (Abnormal) Collected: 09/18/21 0944    Lab Status: Final result Specimen: Blood from Arm, Right Updated: 09/18/21 1246     CRP 13 4 mg/L     Sedimentation rate, automated [[id number]]  (Normal) Collected: 09/18/21 0944    Lab Status: Final result Specimen: Blood from Arm, Right Updated: 09/18/21 1246     Sed Rate 23 mm/hour     NT-BNP PRO [[id number]]  (Normal) Collected: 09/18/21 0944    Lab Status: Final result Specimen: Blood from Arm, Right Updated: 09/18/21 1011     NT-proBNP 82 pg/mL     Comprehensive metabolic panel [[id number]]  (Abnormal) Collected: 09/18/21 0944    Lab Status: Final result Specimen: Blood from Arm, Right Updated: 09/18/21 1011     Sodium 134 mmol/L      Potassium 4 8 mmol/L      Chloride 105 mmol/L      CO2 25 mmol/L      ANION GAP 4 mmol/L      BUN 17 mg/dL      Creatinine 0 52 mg/dL      Glucose 74 mg/dL      Calcium 8 1 mg/dL      Corrected Calcium 8 8 mg/dL      AST 50 U/L      ALT 32 U/L      Alkaline Phosphatase 117 U/L      Total Protein 6 7 g/dL      Albumin 3 1 g/dL      Total Bilirubin 0 57 mg/dL      eGFR 92 ml/min/1 73sq m     Narrative:      National Kidney Disease Foundation guidelines for Chronic Kidney Disease (CKD):     Stage 1 with normal or high GFR (GFR > 90 mL/min/1 73 square meters)    Stage 2 Mild CKD (GFR = 60-89 mL/min/1 73 square meters)    Stage 3A Moderate CKD (GFR = 45-59 mL/min/1 73 square meters)    Stage 3B Moderate CKD (GFR = 30-44 mL/min/1 73 square meters)    Stage 4 Severe CKD (GFR = 15-29 mL/min/1 73 square meters)    Stage 5 End Stage CKD (GFR <15 mL/min/1 73 square meters)  Note: GFR calculation is accurate only with a steady state creatinine    CBC and differential [[id number]]  (Abnormal) Collected: 09/18/21 0944    Lab Status: Final result Specimen: Blood from Arm, Right Updated: 09/18/21 1005     WBC 6 09 Thousand/uL      RBC 4 07 Million/uL      Hemoglobin 11 4 g/dL      Hematocrit 37 0 %      MCV 91 fL      MCH 28 0 pg      MCHC 30 8 g/dL      RDW 16 0 %      MPV 8 1 fL      Platelets 425 Thousands/uL      nRBC 0 /100 WBCs      Neutrophils Relative 73 %      Immat GRANS % 1 %      Lymphocytes Relative 16 %      Monocytes Relative 10 %      Eosinophils Relative 0 %      Basophils Relative 0 %      Neutrophils Absolute 4 47 Thousands/µL      Immature Grans Absolute 0 03 Thousand/uL      Lymphocytes Absolute 0 95 Thousands/µL      Monocytes Absolute 0 61 Thousand/µL Eosinophils Absolute 0 02 Thousand/µL      Basophils Absolute 0 01 Thousands/µL           VAS lower limb venous duplex study, complete bilateral   Final Result   CONCLUSION:  Impression:  RIGHT LOWER LIMB:  No evidence of acute or chronic deep vein thrombosis  No evidence of superficial thrombophlebitis noted  Doppler evaluation shows a normal response to augmentation maneuvers  Popliteal, posterior tibial and anterior tibial arterial Doppler waveforms are  triphasic  LEFT LOWER LIMB:  No evidence of acute or chronic deep vein thrombosis  No evidence of superficial thrombophlebitis noted  Doppler evaluation shows a normal response to augmentation maneuvers  Popliteal, posterior tibial and anterior tibial arterial Doppler waveforms are  triphasic  Technical findings were given to Dr Caro Shepard  SIGNATURE:  Electronically Signed by: Tenisha Coleman MD on 2021-09-19 10:33:29 AM       PROCEDURES  Procedures            ED COURSE  Medications   oxyCODONE (ROXICODONE) IR tablet 5 mg (5 mg Oral Given 9/18/21 0956)   gabapentin (NEURONTIN) capsule 300 mg (300 mg Oral Given 9/18/21 0944)   rOPINIRole (REQUIP) tablet 1 mg (1 mg Oral Given 9/18/21 150)     39-year-old female with rheumatologic disease and restless leg syndrome presenting with leg pain, redness, and swelling x2 weeks  Vital signs reviewed, afebrile and within normal limits  Differential diagnosis includes volume overload with lower extremity edema, cellulitis (less likely given history and physical), deep vein thrombosis, or rheumatologic process such as myositis/dermatomyositis or lupus flare up, versus another etiology of symptoms  Unfortunately, patient care delayed by presence of numerous ill patients in the emergency department  Patient is kindly understanding of this  Patient care and outcome is not affected by the delay except for inconvenience to the patient        Patient has a lot of pain in her lower extremities and history and physical exam suggest restless leg syndrome, although other choreiform disorders are on differential     Oxycodone, gabapentin, and ultimately, repeat overall administered for pain  Case discussed with patient's rheumatologist who recommends obtaining lower extremity venous duplex study, ESR, CRP, and CK in addition to the basic labs, as well as starting patient on prednisone  Venous ultrasound reveals no evidence of DVT  Labs reveal CMP with intact renal function, CBC without leukocytosis or neutrophil predominance, with baseline anemia, without thrombocytopenia or thrombocytosis  CK is elevated, as is ESR  UA is with innumerable white blood cells as well as innumerable red blood cells, without bacteria  This could be secondary to an inflammatory process rather than due to urinary tract infection  Will start the patient on prednisone as recommended by her rheumatologist   Recommend outpatient follow-up with rheumatology  Will also trial patient on ropinirole for restless leg syndrome  Follow-up with primary care physician for further evaluation and treatment as deemed appropriate  Return to emergency department with new or worsening symptoms  CLINICAL IMPRESSION  Final diagnoses:   Bilateral lower extremity edema   Restless legs syndrome       Discharge Medication List as of 9/18/2021  3:40 PM      START taking these medications    Details   ! ! predniSONE 20 mg tablet Take 1 tablet (20 mg total) by mouth daily for 3 days, Starting Sat 9/18/2021, Until Tue 9/21/2021, Normal      rOPINIRole (REQUIP) 1 mg tablet Take 1 tablet (1 mg total) by mouth daily at bedtime for 10 days, Starting Sat 9/18/2021, Until Tue 9/28/2021, Normal       !! - Potential duplicate medications found  Please discuss with provider        CONTINUE these medications which have NOT CHANGED    Details   Ascorbic Acid, Vitamin C, (VITAMIN C) 100 MG tablet Take 400 mg by mouth daily, Historical Med      docusate sodium (COLACE) 100 mg capsule Take 1 capsule (100 mg total) by mouth 2 (two) times a day, Starting Tue 6/1/2021, Normal      furosemide (LASIX) 20 mg tablet TAKE 1/2 TABLET BY MOUTH DAILY FOR 5 DAYS, Normal      gabapentin (NEURONTIN) 300 mg capsule TAKE ONE CAPSULE BY MOUTH THREE TIMES A DAY, Normal      hydroxychloroquine (PLAQUENIL) 200 mg tablet 1 pill am 1/2 pill pm, Starting Sat 4/4/2020, Historical Med      levothyroxine 25 mcg tablet TAKE 1 TABLET BY MOUTH EVERY DAY, Normal      methotrexate 2 5 MG tablet 3 tablets by mouth once a week on Thursday, No Print      Multiple Vitamins-Minerals (CENTRUM SILVER PO) Take 1 tablet by mouth daily, Starting Tue 12/13/2016, Historical Med      nortriptyline (PAMELOR) 50 mg capsule Take 1 capsule (50 mg total) by mouth daily at bedtime, Starting Fri 9/17/2021, Normal      oxyCODONE (ROXICODONE) 5 mg immediate release tablet Take 1 tablet (5 mg total) by mouth every 4 (four) hours as needed for moderate painMax Daily Amount: 30 mg, Starting Fri 8/13/2021, Normal      !! predniSONE 5 mg tablet Take 5 mg by mouth daily, Starting Mon 9/28/2020, Historical Med      Psyllium (METAMUCIL FIBER PO) Take 1 packet by mouth 3 (three) times a day , Historical Med      Vitamin E 400 units TABS Take 400 Units by mouth daily  , Starting Tue 12/13/2016, Historical Med       !! - Potential duplicate medications found  Please discuss with provider

## 2021-09-19 PROCEDURE — 93970 EXTREMITY STUDY: CPT | Performed by: SURGERY

## 2021-09-20 LAB
ATRIAL RATE: 86 BPM
P AXIS: 77 DEGREES
PR INTERVAL: 136 MS
QRS AXIS: 87 DEGREES
QRSD INTERVAL: 126 MS
QT INTERVAL: 398 MS
QTC INTERVAL: 476 MS
T WAVE AXIS: 68 DEGREES
VENTRICULAR RATE: 86 BPM

## 2021-09-20 PROCEDURE — 93010 ELECTROCARDIOGRAM REPORT: CPT | Performed by: INTERNAL MEDICINE

## 2021-09-20 NOTE — TELEPHONE ENCOUNTER
Called and advised patient as per note, patient understood  Per patient she does take the gabapentin 300mg 3 times a day everyday so if a temporary script can be sent for patient until upcoming appointment  Patient understood and had no further questions

## 2021-09-22 LAB
BACTERIA UR CULT: ABNORMAL
BACTERIA UR CULT: ABNORMAL

## 2021-09-30 ENCOUNTER — TELEPHONE (OUTPATIENT)
Dept: INTERNAL MEDICINE CLINIC | Facility: CLINIC | Age: 76
End: 2021-09-30

## 2021-10-20 ENCOUNTER — APPOINTMENT (OUTPATIENT)
Dept: LAB | Facility: CLINIC | Age: 76
End: 2021-10-20
Payer: MEDICARE

## 2021-10-20 ENCOUNTER — PATIENT OUTREACH (OUTPATIENT)
Dept: INTERNAL MEDICINE CLINIC | Facility: CLINIC | Age: 76
End: 2021-10-20

## 2021-10-20 ENCOUNTER — OFFICE VISIT (OUTPATIENT)
Dept: INTERNAL MEDICINE CLINIC | Facility: CLINIC | Age: 76
End: 2021-10-20

## 2021-10-20 VITALS
BODY MASS INDEX: 22.04 KG/M2 | DIASTOLIC BLOOD PRESSURE: 75 MMHG | HEART RATE: 73 BPM | HEIGHT: 58 IN | WEIGHT: 105 LBS | TEMPERATURE: 97.2 F | SYSTOLIC BLOOD PRESSURE: 128 MMHG

## 2021-10-20 DIAGNOSIS — R30.0 DYSURIA: ICD-10-CM

## 2021-10-20 DIAGNOSIS — E03.8 HYPOTHYROIDISM DUE TO HASHIMOTO'S THYROIDITIS: ICD-10-CM

## 2021-10-20 DIAGNOSIS — M05.79 RHEUMATOID ARTHRITIS INVOLVING MULTIPLE SITES WITH POSITIVE RHEUMATOID FACTOR (HCC): ICD-10-CM

## 2021-10-20 DIAGNOSIS — G25.81 RESTLESS LEGS SYNDROME: ICD-10-CM

## 2021-10-20 DIAGNOSIS — M32.19 OTHER SYSTEMIC LUPUS ERYTHEMATOSUS WITH OTHER ORGAN INVOLVEMENT (HCC): ICD-10-CM

## 2021-10-20 DIAGNOSIS — M79.89 LEG SWELLING: ICD-10-CM

## 2021-10-20 DIAGNOSIS — G89.4 CHRONIC PAIN SYNDROME: ICD-10-CM

## 2021-10-20 DIAGNOSIS — E06.3 HYPOTHYROIDISM DUE TO HASHIMOTO'S THYROIDITIS: ICD-10-CM

## 2021-10-20 DIAGNOSIS — Z23 NEED FOR INFLUENZA VACCINATION: ICD-10-CM

## 2021-10-20 DIAGNOSIS — I87.2 VENOUS INSUFFICIENCY: Primary | ICD-10-CM

## 2021-10-20 DIAGNOSIS — M79.89 SWELLING OF LOWER EXTREMITY: ICD-10-CM

## 2021-10-20 LAB
BASOPHILS # BLD AUTO: 0.02 THOUSANDS/ΜL (ref 0–0.1)
BASOPHILS NFR BLD AUTO: 0 % (ref 0–1)
EOSINOPHIL # BLD AUTO: 0.07 THOUSAND/ΜL (ref 0–0.61)
EOSINOPHIL NFR BLD AUTO: 1 % (ref 0–6)
ERYTHROCYTE [DISTWIDTH] IN BLOOD BY AUTOMATED COUNT: 16 % (ref 11.6–15.1)
HCT VFR BLD AUTO: 37.5 % (ref 34.8–46.1)
HGB BLD-MCNC: 11.4 G/DL (ref 11.5–15.4)
IMM GRANULOCYTES # BLD AUTO: 0.07 THOUSAND/UL (ref 0–0.2)
IMM GRANULOCYTES NFR BLD AUTO: 1 % (ref 0–2)
LYMPHOCYTES # BLD AUTO: 1.15 THOUSANDS/ΜL (ref 0.6–4.47)
LYMPHOCYTES NFR BLD AUTO: 13 % (ref 14–44)
MCH RBC QN AUTO: 27.6 PG (ref 26.8–34.3)
MCHC RBC AUTO-ENTMCNC: 30.4 G/DL (ref 31.4–37.4)
MCV RBC AUTO: 91 FL (ref 82–98)
MONOCYTES # BLD AUTO: 0.81 THOUSAND/ΜL (ref 0.17–1.22)
MONOCYTES NFR BLD AUTO: 9 % (ref 4–12)
NEUTROPHILS # BLD AUTO: 6.94 THOUSANDS/ΜL (ref 1.85–7.62)
NEUTS SEG NFR BLD AUTO: 76 % (ref 43–75)
NRBC BLD AUTO-RTO: 0 /100 WBCS
PLATELET # BLD AUTO: 346 THOUSANDS/UL (ref 149–390)
PMV BLD AUTO: 8.7 FL (ref 8.9–12.7)
RBC # BLD AUTO: 4.13 MILLION/UL (ref 3.81–5.12)
TSH SERPL DL<=0.05 MIU/L-ACNC: 3.46 UIU/ML (ref 0.36–3.74)
WBC # BLD AUTO: 9.06 THOUSAND/UL (ref 4.31–10.16)

## 2021-10-20 PROCEDURE — 87077 CULTURE AEROBIC IDENTIFY: CPT | Performed by: PHYSICIAN ASSISTANT

## 2021-10-20 PROCEDURE — 87086 URINE CULTURE/COLONY COUNT: CPT | Performed by: PHYSICIAN ASSISTANT

## 2021-10-20 PROCEDURE — 87186 SC STD MICRODIL/AGAR DIL: CPT | Performed by: PHYSICIAN ASSISTANT

## 2021-10-20 PROCEDURE — 84443 ASSAY THYROID STIM HORMONE: CPT | Performed by: PHYSICIAN ASSISTANT

## 2021-10-20 PROCEDURE — G0008 ADMIN INFLUENZA VIRUS VAC: HCPCS | Performed by: PHYSICIAN ASSISTANT

## 2021-10-20 PROCEDURE — 90662 IIV NO PRSV INCREASED AG IM: CPT | Performed by: PHYSICIAN ASSISTANT

## 2021-10-20 PROCEDURE — 99214 OFFICE O/P EST MOD 30 MIN: CPT | Performed by: PHYSICIAN ASSISTANT

## 2021-10-20 PROCEDURE — 85025 COMPLETE CBC W/AUTO DIFF WBC: CPT | Performed by: PHYSICIAN ASSISTANT

## 2021-10-20 PROCEDURE — 36415 COLL VENOUS BLD VENIPUNCTURE: CPT | Performed by: PHYSICIAN ASSISTANT

## 2021-10-20 RX ORDER — ROPINIROLE 1 MG/1
1 TABLET, FILM COATED ORAL
Qty: 30 TABLET | Refills: 1 | Status: SHIPPED | OUTPATIENT
Start: 2021-10-20 | End: 2021-12-09

## 2021-10-22 LAB — BACTERIA UR CULT: ABNORMAL

## 2021-10-26 RX ORDER — FUROSEMIDE 20 MG/1
TABLET ORAL
Qty: 40 TABLET | Refills: 0 | Status: SHIPPED | OUTPATIENT
Start: 2021-10-26 | End: 2021-11-17

## 2021-10-26 RX ORDER — GABAPENTIN 300 MG/1
300 CAPSULE ORAL 2 TIMES DAILY
Qty: 180 CAPSULE | Refills: 0 | Status: SHIPPED | OUTPATIENT
Start: 2021-10-26 | End: 2022-01-19

## 2021-11-04 ENCOUNTER — APPOINTMENT (OUTPATIENT)
Dept: LAB | Facility: HOSPITAL | Age: 76
End: 2021-11-04
Payer: MEDICARE

## 2021-11-04 DIAGNOSIS — I87.2 VENOUS INSUFFICIENCY: ICD-10-CM

## 2021-11-04 DIAGNOSIS — M81.0 SENILE OSTEOPOROSIS: ICD-10-CM

## 2021-11-04 LAB
25(OH)D3 SERPL-MCNC: 36.7 NG/ML (ref 30–100)
ANION GAP SERPL CALCULATED.3IONS-SCNC: 5 MMOL/L (ref 4–13)
BUN SERPL-MCNC: 14 MG/DL (ref 5–25)
CALCIUM SERPL-MCNC: 9.3 MG/DL (ref 8.3–10.1)
CALCIUM SERPL-MCNC: 9.5 MG/DL (ref 8.3–10.1)
CHLORIDE SERPL-SCNC: 104 MMOL/L (ref 100–108)
CO2 SERPL-SCNC: 26 MMOL/L (ref 21–32)
CREAT SERPL-MCNC: 0.48 MG/DL (ref 0.6–1.3)
GFR SERPL CREATININE-BSD FRML MDRD: 95 ML/MIN/1.73SQ M
GLUCOSE P FAST SERPL-MCNC: 80 MG/DL (ref 65–99)
POTASSIUM SERPL-SCNC: 4.3 MMOL/L (ref 3.5–5.3)
SODIUM SERPL-SCNC: 135 MMOL/L (ref 136–145)

## 2021-11-04 PROCEDURE — 80048 BASIC METABOLIC PNL TOTAL CA: CPT

## 2021-11-04 PROCEDURE — 36415 COLL VENOUS BLD VENIPUNCTURE: CPT

## 2021-11-04 PROCEDURE — 82306 VITAMIN D 25 HYDROXY: CPT

## 2021-11-04 PROCEDURE — 82310 ASSAY OF CALCIUM: CPT

## 2021-11-08 ENCOUNTER — TELEPHONE (OUTPATIENT)
Dept: INTERNAL MEDICINE CLINIC | Facility: CLINIC | Age: 76
End: 2021-11-08

## 2021-11-15 ENCOUNTER — TELEPHONE (OUTPATIENT)
Dept: INTERNAL MEDICINE CLINIC | Facility: CLINIC | Age: 76
End: 2021-11-15

## 2021-11-16 ENCOUNTER — HOSPITAL ENCOUNTER (OUTPATIENT)
Dept: INFUSION CENTER | Facility: HOSPITAL | Age: 76
Discharge: HOME | End: 2021-11-16
Payer: MEDICARE

## 2021-11-16 VITALS — TEMPERATURE: 97.6 F

## 2021-11-16 PROCEDURE — 96401 CHEMO ANTI-NEOPL SQ/IM: CPT

## 2021-11-16 RX ADMIN — DENOSUMAB 60 MG: 60 INJECTION SUBCUTANEOUS at 07:31

## 2021-11-17 PROBLEM — I87.2 VENOUS INSUFFICIENCY: Status: ACTIVE | Noted: 2021-11-17

## 2021-11-29 DIAGNOSIS — E03.9 HYPOTHYROIDISM, UNSPECIFIED TYPE: ICD-10-CM

## 2021-11-29 RX ORDER — LEVOTHYROXINE SODIUM 0.03 MG/1
25 TABLET ORAL DAILY
Qty: 90 TABLET | Refills: 1 | Status: SHIPPED | OUTPATIENT
Start: 2021-11-29 | End: 2022-08-03

## 2021-11-30 ENCOUNTER — TELEPHONE (OUTPATIENT)
Dept: INTERNAL MEDICINE CLINIC | Facility: CLINIC | Age: 76
End: 2021-11-30

## 2021-12-15 ENCOUNTER — HOSPITAL ENCOUNTER (OUTPATIENT)
Dept: RADIOLOGY | Facility: HOSPITAL | Age: 76
Discharge: HOME | End: 2021-12-15
Attending: ORTHOPAEDIC SURGERY
Payer: MEDICARE

## 2021-12-15 ENCOUNTER — OFFICE VISIT (OUTPATIENT)
Dept: OBGYN CLINIC | Facility: HOSPITAL | Age: 76
End: 2021-12-15
Payer: MEDICARE

## 2021-12-15 VITALS — SYSTOLIC BLOOD PRESSURE: 113 MMHG | DIASTOLIC BLOOD PRESSURE: 65 MMHG | HEART RATE: 76 BPM

## 2021-12-15 DIAGNOSIS — M25.531 PAIN IN RIGHT WRIST: Primary | ICD-10-CM

## 2021-12-15 DIAGNOSIS — S66.811A EXTENSOR TENDON RUPTURE OF HAND, RIGHT, INITIAL ENCOUNTER: ICD-10-CM

## 2021-12-15 DIAGNOSIS — M25.531 PAIN IN RIGHT WRIST: ICD-10-CM

## 2021-12-15 DIAGNOSIS — M19.031 ARTHRITIS OF WRIST, RIGHT: ICD-10-CM

## 2021-12-15 PROCEDURE — 73110 X-RAY EXAM OF WRIST: CPT

## 2021-12-15 PROCEDURE — 99214 OFFICE O/P EST MOD 30 MIN: CPT | Performed by: ORTHOPAEDIC SURGERY

## 2021-12-26 ENCOUNTER — HOSPITAL ENCOUNTER (INPATIENT)
Facility: HOSPITAL | Age: 76
LOS: 1 days | Discharge: HOME HEALTH | DRG: 087 | End: 2021-12-27
Attending: EMERGENCY MEDICINE | Admitting: SURGERY
Payer: MEDICARE

## 2021-12-26 ENCOUNTER — APPOINTMENT (EMERGENCY)
Dept: RADIOLOGY | Facility: HOSPITAL | Age: 76
DRG: 087 | End: 2021-12-26
Payer: MEDICARE

## 2021-12-26 ENCOUNTER — APPOINTMENT (INPATIENT)
Dept: RADIOLOGY | Facility: HOSPITAL | Age: 76
DRG: 087 | End: 2021-12-26
Payer: MEDICARE

## 2021-12-26 DIAGNOSIS — S06.5X9A SDH (SUBDURAL HEMATOMA) (HCC): ICD-10-CM

## 2021-12-26 DIAGNOSIS — S06.6X9A TRAUMATIC SUBARACHNOID HEMORRHAGE (HCC): ICD-10-CM

## 2021-12-26 DIAGNOSIS — S06.5X9A TRAUMATIC SUBDURAL HEMATOMA (HCC): ICD-10-CM
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
## 2021-12-26 DIAGNOSIS — Z96.612 S/P REVERSE TOTAL SHOULDER ARTHROPLASTY, LEFT: ICD-10-CM

## 2021-12-26 DIAGNOSIS — S09.90XA INJURY OF HEAD, INITIAL ENCOUNTER: ICD-10-CM

## 2021-12-26 PROBLEM — M25.512 SHOULDER PAIN, BILATERAL: Status: ACTIVE | Noted: 2021-07-27

## 2021-12-26 PROBLEM — S06.5XAA SDH (SUBDURAL HEMATOMA): Status: ACTIVE | Noted: 2021-12-26

## 2021-12-26 LAB
ALBUMIN SERPL BCP-MCNC: 3.3 G/DL (ref 3.5–5)
ALP SERPL-CCNC: 91 U/L (ref 46–116)
ALT SERPL W P-5'-P-CCNC: 24 U/L (ref 12–78)
ANION GAP SERPL CALCULATED.3IONS-SCNC: 6 MMOL/L (ref 4–13)
AST SERPL W P-5'-P-CCNC: 25 U/L (ref 5–45)
ATRIAL RATE: 77 BPM
BASOPHILS # BLD AUTO: 0.01 THOUSANDS/ΜL (ref 0–0.1)
BASOPHILS NFR BLD AUTO: 0 % (ref 0–1)
BILIRUB SERPL-MCNC: 0.4 MG/DL (ref 0.2–1)
BUN SERPL-MCNC: 17 MG/DL (ref 5–25)
CALCIUM ALBUM COR SERPL-MCNC: 9.3 MG/DL (ref 8.3–10.1)
CALCIUM SERPL-MCNC: 8.7 MG/DL (ref 8.3–10.1)
CHLORIDE SERPL-SCNC: 108 MMOL/L (ref 100–108)
CO2 SERPL-SCNC: 28 MMOL/L (ref 21–32)
CREAT SERPL-MCNC: 0.63 MG/DL (ref 0.6–1.3)
EOSINOPHIL # BLD AUTO: 0.04 THOUSAND/ΜL (ref 0–0.61)
EOSINOPHIL NFR BLD AUTO: 1 % (ref 0–6)
ERYTHROCYTE [DISTWIDTH] IN BLOOD BY AUTOMATED COUNT: 16.8 % (ref 11.6–15.1)
FLUAV RNA RESP QL NAA+PROBE: NEGATIVE
FLUBV RNA RESP QL NAA+PROBE: NEGATIVE
GFR SERPL CREATININE-BSD FRML MDRD: 86 ML/MIN/1.73SQ M
GLUCOSE SERPL-MCNC: 92 MG/DL (ref 65–140)
HCT VFR BLD AUTO: 34.6 % (ref 34.8–46.1)
HGB BLD-MCNC: 10.7 G/DL (ref 11.5–15.4)
IMM GRANULOCYTES # BLD AUTO: 0.03 THOUSAND/UL (ref 0–0.2)
IMM GRANULOCYTES NFR BLD AUTO: 0 % (ref 0–2)
INR PPP: 0.84 (ref 0.84–1.19)
LYMPHOCYTES # BLD AUTO: 1.04 THOUSANDS/ΜL (ref 0.6–4.47)
LYMPHOCYTES NFR BLD AUTO: 12 % (ref 14–44)
MCH RBC QN AUTO: 27.5 PG (ref 26.8–34.3)
MCHC RBC AUTO-ENTMCNC: 30.9 G/DL (ref 31.4–37.4)
MCV RBC AUTO: 89 FL (ref 82–98)
MONOCYTES # BLD AUTO: 0.67 THOUSAND/ΜL (ref 0.17–1.22)
MONOCYTES NFR BLD AUTO: 8 % (ref 4–12)
NEUTROPHILS # BLD AUTO: 6.9 THOUSANDS/ΜL (ref 1.85–7.62)
NEUTS SEG NFR BLD AUTO: 79 % (ref 43–75)
NRBC BLD AUTO-RTO: 0 /100 WBCS
P AXIS: 70 DEGREES
PLATELET # BLD AUTO: 230 THOUSANDS/UL (ref 149–390)
PMV BLD AUTO: 9.7 FL (ref 8.9–12.7)
POTASSIUM SERPL-SCNC: 3.5 MMOL/L (ref 3.5–5.3)
PR INTERVAL: 124 MS
PROT SERPL-MCNC: 6.5 G/DL (ref 6.4–8.2)
PROTHROMBIN TIME: 11.2 SECONDS (ref 11.6–14.5)
QRS AXIS: 78 DEGREES
QRSD INTERVAL: 118 MS
QT INTERVAL: 420 MS
QTC INTERVAL: 475 MS
RBC # BLD AUTO: 3.89 MILLION/UL (ref 3.81–5.12)
RSV RNA RESP QL NAA+PROBE: NEGATIVE
SARS-COV-2 RNA RESP QL NAA+PROBE: NEGATIVE
SODIUM SERPL-SCNC: 142 MMOL/L (ref 136–145)
T WAVE AXIS: 55 DEGREES
VENTRICULAR RATE: 77 BPM
WBC # BLD AUTO: 8.69 THOUSAND/UL (ref 4.31–10.16)

## 2021-12-26 PROCEDURE — 71045 X-RAY EXAM CHEST 1 VIEW: CPT

## 2021-12-26 PROCEDURE — 99285 EMERGENCY DEPT VISIT HI MDM: CPT

## 2021-12-26 PROCEDURE — G1004 CDSM NDSC: HCPCS

## 2021-12-26 PROCEDURE — 93005 ELECTROCARDIOGRAM TRACING: CPT

## 2021-12-26 PROCEDURE — 99291 CRITICAL CARE FIRST HOUR: CPT | Performed by: EMERGENCY MEDICINE

## 2021-12-26 PROCEDURE — 80053 COMPREHEN METABOLIC PANEL: CPT | Performed by: EMERGENCY MEDICINE

## 2021-12-26 PROCEDURE — 90715 TDAP VACCINE 7 YRS/> IM: CPT | Performed by: EMERGENCY MEDICINE

## 2021-12-26 PROCEDURE — 0241U HB NFCT DS VIR RESP RNA 4 TRGT: CPT

## 2021-12-26 PROCEDURE — 72125 CT NECK SPINE W/O DYE: CPT

## 2021-12-26 PROCEDURE — 85610 PROTHROMBIN TIME: CPT

## 2021-12-26 PROCEDURE — 90471 IMMUNIZATION ADMIN: CPT

## 2021-12-26 PROCEDURE — 99223 1ST HOSP IP/OBS HIGH 75: CPT | Performed by: SURGERY

## 2021-12-26 PROCEDURE — 99221 1ST HOSP IP/OBS SF/LOW 40: CPT | Performed by: NURSE PRACTITIONER

## 2021-12-26 PROCEDURE — 93010 ELECTROCARDIOGRAM REPORT: CPT | Performed by: INTERNAL MEDICINE

## 2021-12-26 PROCEDURE — 36415 COLL VENOUS BLD VENIPUNCTURE: CPT | Performed by: EMERGENCY MEDICINE

## 2021-12-26 PROCEDURE — G0168 WOUND CLOSURE BY ADHESIVE: HCPCS | Performed by: EMERGENCY MEDICINE

## 2021-12-26 PROCEDURE — 85025 COMPLETE CBC W/AUTO DIFF WBC: CPT | Performed by: EMERGENCY MEDICINE

## 2021-12-26 PROCEDURE — 70450 CT HEAD/BRAIN W/O DYE: CPT

## 2021-12-26 RX ORDER — NORTRIPTYLINE HYDROCHLORIDE 50 MG/1
50 CAPSULE ORAL
Status: DISCONTINUED | OUTPATIENT
Start: 2021-12-26 | End: 2021-12-27 | Stop reason: HOSPADM

## 2021-12-26 RX ORDER — ONDANSETRON 2 MG/ML
4 INJECTION INTRAMUSCULAR; INTRAVENOUS EVERY 6 HOURS PRN
Status: DISCONTINUED | OUTPATIENT
Start: 2021-12-26 | End: 2021-12-27 | Stop reason: HOSPADM

## 2021-12-26 RX ORDER — OXYCODONE HYDROCHLORIDE 5 MG/1
2.5 TABLET ORAL EVERY 6 HOURS PRN
Status: DISCONTINUED | OUTPATIENT
Start: 2021-12-26 | End: 2021-12-27 | Stop reason: HOSPADM

## 2021-12-26 RX ORDER — ROPINIROLE 1 MG/1
1 TABLET, FILM COATED ORAL ONCE
Status: COMPLETED | OUTPATIENT
Start: 2021-12-26 | End: 2021-12-26

## 2021-12-26 RX ORDER — ACETAMINOPHEN 325 MG/1
975 TABLET ORAL ONCE
Status: COMPLETED | OUTPATIENT
Start: 2021-12-26 | End: 2021-12-26

## 2021-12-26 RX ORDER — ROPINIROLE 1 MG/1
1 TABLET, FILM COATED ORAL
Status: DISCONTINUED | OUTPATIENT
Start: 2021-12-26 | End: 2021-12-27 | Stop reason: HOSPADM

## 2021-12-26 RX ORDER — GABAPENTIN 300 MG/1
300 CAPSULE ORAL 2 TIMES DAILY
Status: DISCONTINUED | OUTPATIENT
Start: 2021-12-26 | End: 2021-12-27 | Stop reason: HOSPADM

## 2021-12-26 RX ORDER — LEVOTHYROXINE SODIUM 0.03 MG/1
25 TABLET ORAL
Status: DISCONTINUED | OUTPATIENT
Start: 2021-12-26 | End: 2021-12-27 | Stop reason: HOSPADM

## 2021-12-26 RX ORDER — METHOCARBAMOL 500 MG/1
500 TABLET, FILM COATED ORAL EVERY 6 HOURS PRN
Status: DISCONTINUED | OUTPATIENT
Start: 2021-12-26 | End: 2021-12-27 | Stop reason: HOSPADM

## 2021-12-26 RX ORDER — OXYCODONE HYDROCHLORIDE 5 MG/1
2.5 TABLET ORAL ONCE
Status: COMPLETED | OUTPATIENT
Start: 2021-12-26 | End: 2021-12-26

## 2021-12-26 RX ORDER — ACETAMINOPHEN 325 MG/1
975 TABLET ORAL EVERY 8 HOURS SCHEDULED
Status: DISCONTINUED | OUTPATIENT
Start: 2021-12-26 | End: 2021-12-27 | Stop reason: HOSPADM

## 2021-12-26 RX ORDER — PREDNISONE 10 MG/1
5 TABLET ORAL DAILY
Status: DISCONTINUED | OUTPATIENT
Start: 2021-12-26 | End: 2021-12-27 | Stop reason: HOSPADM

## 2021-12-26 RX ORDER — POTASSIUM CHLORIDE 20 MEQ/1
40 TABLET, EXTENDED RELEASE ORAL ONCE
Status: COMPLETED | OUTPATIENT
Start: 2021-12-26 | End: 2021-12-26

## 2021-12-26 RX ADMIN — TETANUS TOXOID, REDUCED DIPHTHERIA TOXOID AND ACELLULAR PERTUSSIS VACCINE, ADSORBED 0.5 ML: 5; 2.5; 8; 8; 2.5 SUSPENSION INTRAMUSCULAR at 04:19

## 2021-12-26 RX ADMIN — PREDNISONE 5 MG: 10 TABLET ORAL at 09:19

## 2021-12-26 RX ADMIN — ROPINIROLE HYDROCHLORIDE 1 MG: 1 TABLET, FILM COATED ORAL at 11:09

## 2021-12-26 RX ADMIN — POTASSIUM CHLORIDE 40 MEQ: 1500 TABLET, EXTENDED RELEASE ORAL at 11:10

## 2021-12-26 RX ADMIN — ACETAMINOPHEN 975 MG: 325 TABLET, FILM COATED ORAL at 04:19

## 2021-12-26 RX ADMIN — OXYCODONE HYDROCHLORIDE 2.5 MG: 5 TABLET ORAL at 17:10

## 2021-12-26 RX ADMIN — ACETAMINOPHEN 975 MG: 325 TABLET, FILM COATED ORAL at 22:13

## 2021-12-26 RX ADMIN — OXYCODONE HYDROCHLORIDE 2.5 MG: 5 TABLET ORAL at 07:56

## 2021-12-26 RX ADMIN — ROPINIROLE HYDROCHLORIDE 1 MG: 1 TABLET, FILM COATED ORAL at 22:13

## 2021-12-26 RX ADMIN — LEVOTHYROXINE SODIUM 25 MCG: 25 TABLET ORAL at 11:10

## 2021-12-26 RX ADMIN — METHOCARBAMOL 500 MG: 500 TABLET, FILM COATED ORAL at 13:26

## 2021-12-26 RX ADMIN — NORTRIPTYLINE HYDROCHLORIDE 50 MG: 50 CAPSULE ORAL at 22:13

## 2021-12-26 RX ADMIN — ACETAMINOPHEN 975 MG: 325 TABLET, FILM COATED ORAL at 13:26

## 2021-12-26 RX ADMIN — GABAPENTIN 300 MG: 300 CAPSULE ORAL at 09:19

## 2021-12-26 RX ADMIN — GABAPENTIN 300 MG: 300 CAPSULE ORAL at 18:11

## 2021-12-26 RX ADMIN — ACETAMINOPHEN 975 MG: 325 TABLET, FILM COATED ORAL at 07:56

## 2021-12-26 NOTE — ASSESSMENT & PLAN NOTE
Acute right and interhemispheric SDH (5 mm), small left posterior fossa SAH, small hemorrhagic parenchymal contusions s/p fall  · Lost balance at home and fell to floor, + head stroke, no LOC  · No hx of AC/AP  · Neuro exam non-focal  GCS 15  AOx3  Noted with right frontal contusion and vaishali-orbital ecchymosis  Bilateral chronic shoulder pain  Imaging:   · CT head wo, 12/26/2021: read pending, per my review stable hemorrhagic contusions and SDH/SAH  · CT head wo, 12/26/2021: Acute intracranial hemorrhages, as described above  Please see discussion  Short-term follow-up recommended  Plan:  · Continue to follow neuro exam closely  · STAT CT head with decline in GCS > 2 pts in 1 hour  · Keppra per trauma team   · Pain control - patient complaining of headache  · Do not anticipate any neurosurgical intervention  · Mobilize with PT/OT  · DVT ppx: SCDs, ok for pharm DVT ppx  Neurosurgery will sign off  No follow up indicated  Please call back with any questions or concerns

## 2021-12-26 NOTE — ASSESSMENT & PLAN NOTE
- see above plan for SDH, patient reports that she falls somewhat frequently  -appreciate PT/OT evaluation recommendations  They recommend discharge home with home health  Case management has arranged for home health  Will discharge patient home today

## 2021-12-26 NOTE — H&P
H&P Exam - Trauma   Geetha Rivera 67 y o  female MRN: [id number]  Unit/Bed#: ED 02 Encounter: [id number]    Assessment/Plan   Trauma Alert: Evaluation  Model of Arrival: Ambulance  Trauma Team: Attending Wanda Lolyd and [facility]  Consultants: Neurosurgery: SDH/SAH/parenchymal contusions  Time Called 7:30 a m  Trauma Active Problems:   - status post mechanical fall, positive head strike, no LOC, no AC/AP  - reports headache and bilateral shoulder pain  - SDH/SAH/parenchymal contusions on imaging  - abrasion with swelling above right eyebrow laterally, no active bleeding    Trauma Plan:   - admit to Trauma Service, SD2/HOT protocol with neuro checks  - neurosurgery consult for head bleed  - will workup bilateral shoulder pain, unclear if more chronic or acute with fall, CT C-spine was negative but will talk to Neurosurgery regarding utility of possible MRI and neck to further evaluate, patient does have history of bilateral arthroplasty right in 2020 and left in 2021, will obtain CXR for now to evaluate for acute fracture/osseous abnormality  - PT/OT  - geriatrics consult  - follow-up head CT today 10/26 at 12:00 p m  Chief Complaint:  Headache and bilateral shoulder pain    History of Present Illness   HPI:  Sebas Connor is a 67 y o  female with PMH of PVD, right BBB, GERD, hypothyroidism, RA, SLE, depression with anxiety, restless leg syndrome, arthritis, presenting as a trauma evaluation after a mechanical fall earlier this morning, positive head strike, no LOC, no AC/AP  Patient cannot recall the exact series of events a reports early this morning she thinks she was in her living room when she lost her balance and fell to the ground, striking her head off the floor, denies LOC, does not take AC/AP, denies hitting any other part of her body during the fall  She reports she does fall somewhat frequently    On evaluation she reports headache and bilateral shoulder pain, found to have an abrasion with swelling above the right eyebrow laterally, found to have right SDH, SAH, possible small parenchymal contusions on imaging  Of note she does have recent history of bilateral shoulder arthroplasty, right shoulder in September of 2020 and left shoulder in June of 2021  GCS 15  Hemodynamically stable  Mechanism:Fall    Review of Systems   Constitutional: Negative for chills and fever  Respiratory: Negative for shortness of breath  Cardiovascular: Negative for chest pain  Gastrointestinal: Negative for abdominal pain, nausea and vomiting  Musculoskeletal: Positive for arthralgias  Negative for back pain, neck pain and neck stiffness  Skin: Positive for wound  Neurological: Negative for dizziness, light-headedness and headaches  Psychiatric/Behavioral: Negative for confusion  All other systems reviewed and are negative  12-point, complete review of systems was reviewed and negative except as stated above  Historical Information       Past Medical History:   Diagnosis Date    Acute blood loss anemia 9/9/2020    Aftercare following joint replacement 9/9/2020    Patient had right reversed total shoulder arthroplasty   Pain was controlled when he left the hospital       Anxiety     Arthritis     Depression     Disease of thyroid gland     HYPO    Femur neck fracture (HCC)     GERD (gastroesophageal reflux disease)     Hyperthyroidism     RESOLVED: [id number]    Osteoporosis     Polio     PVD (peripheral vascular disease) ([address] )     Right BBB/left ant fasc block     UTI (urinary tract infection)     Varicella infection     LAST ASSESSED: [id number]     Past Surgical History:   Procedure Laterality Date    APPENDECTOMY      HIP ARTHROPLASTY Left 11/27/2017    Procedure: REMOVAL IM NAIL CONVERSION TO TOTAL HIP ARTHROPLASTY POSTERIOR APPROACH;  Surgeon: Halina Muhammad MD;  [facility];  Service: Orthopedics    HIP FRACTURE SURGERY      HIP SURGERY      both hips replaced;2013 left ,2014 right    JOINT REPLACEMENT Right     HIP TOTAL    NV CONV PREV HIP SURG TO TOT HIP ARTHROPLAS Left 10/4/2017    Procedure: Lady Jesus Manuel removal of left hip;  Surgeon: Carroll Saez MD;  [facility];  Service: Orthopedics    NV RECONSTR TOTAL SHOULDER IMPLANT Right 9/2/2020    Procedure: ARTHROPLASTY SHOULDER REVERSE;  Surgeon: Valarie Talamantes MD;  [facility];  Service: Orthopedics    NV RECONSTR TOTAL SHOULDER IMPLANT Left 6/1/2021    Procedure: ARTHROPLASTY SHOULDER REVERSE;  Surgeon: Valarie Talamantes MD;  [facility];  Service: Orthopedics    REVISION TOTAL HIP ARTHROPLASTY Right     TONSILLECTOMY       Social History   Social History     Substance and Sexual Activity   Alcohol Use Not Currently     Social History     Substance and Sexual Activity   Drug Use Not Currently     Social History     Tobacco Use   Smoking Status Former Smoker   Smokeless Tobacco Never Used   Tobacco Comment    quit 20-30 years ago     E-Cigarette/Vaping    E-Cigarette Use Never User      E-Cigarette/Vaping Substances    Nicotine No     THC No     CBD No     Flavoring No     Other No     Unknown No      Immunization History   Administered Date(s) Administered    COVID-19 PFIZER VACCINE 0 3 ML IM 04/09/2021, 05/04/2021    DTP 10/27/2010    INFLUENZA 10/11/2015, 09/23/2016, 09/21/2018    Influenza, high dose seasonal 0 7 mL 10/20/2021    Influenza, seasonal, injectable 10/27/2010, 09/23/2017    Pneumococcal Conjugate 13-Valent 05/16/2017, 09/23/2017    Pneumococcal Polysaccharide PPV23 11/03/2004, 09/28/2018    Tdap 03/11/2016, 12/26/2021    Zoster Vaccine Recombinant 10/20/2020, 01/07/2021    influenza, trivalent, adjuvanted 09/21/2018, 10/05/2019     Last Tetanus:  Unknown  Family History: Non-contributory  Unable to obtain/limited by        Meds/Allergies   all current active meds have been reviewed    No Known Allergies      PHYSICAL EXAM    Objective   Vitals: First set: Temperature: (!) 97 4 °F (36 3 °C) (12/26/21 0403)  Pulse: 83 (12/26/21 0401)  Respirations: 20 (12/26/21 0401)  Blood Pressure: 166/79 (12/26/21 0401)    Primary Survey:   (A) Airway:  Intact  (B) Breathing:  Breath sounds present and equal bilaterally  (C) Circulation: Pulses:   normal  (D) Disabliity:  GCS Total:  15, Eye Opening:   Spontaneous = 4, Motor Response: Obeys commands = 6 and Verbal Response:  Oriented = 5  (E) Expose:  Completed    Secondary Survey: (Click on Physical Exam tab above)  Physical Exam  Constitutional:       General: She is not in acute distress  HENT:      Head: Normocephalic  Mouth/Throat:      Mouth: Mucous membranes are moist    Cardiovascular:      Rate and Rhythm: Normal rate and regular rhythm  Pulses: Normal pulses  Heart sounds: Normal heart sounds  Pulmonary:      Effort: Pulmonary effort is normal       Breath sounds: Normal breath sounds  Abdominal:      General: There is no distension  Palpations: Abdomen is soft  Tenderness: There is no abdominal tenderness  Musculoskeletal:         General: No tenderness or signs of injury  Normal range of motion  Cervical back: Normal range of motion  No tenderness  Comments: Bilateral shoulder pain    No C/T/L-spine tenderness   Skin:     General: Skin is warm  Capillary Refill: Capillary refill takes less than 2 seconds  Neurological:      Mental Status: She is alert and oriented to person, place, and time     Psychiatric:         Mood and Affect: Mood normal          Invasive Devices  Report    None                 Lab Results:   Results: I have personally reviewed pertinent reports   , BMP/CMP:   Lab Results   Component Value Date    SODIUM 142 12/26/2021    K 3 5 12/26/2021     12/26/2021    CO2 28 12/26/2021    BUN 17 12/26/2021    CREATININE 0 63 12/26/2021    CALCIUM 8 7 12/26/2021    AST 25 12/26/2021    ALT 24 12/26/2021    ALKPHOS 91 12/26/2021    EGFR 86 12/26/2021   , CBC: No results found for: WBC, HGB, HCT, MCV, PLT, ADJUSTEDWBC, MCH, MCHC, RDW, MPV, NRBC and Coagulation:   Lab Results   Component Value Date    INR 0 84 12/26/2021     Imaging/EKG Studies: Results: I have personally reviewed pertinent reports      Other Studies:      Code Status: Level 1 - Full Code  Advance Directive and Living Will:      Power of :    POLST:

## 2021-12-26 NOTE — ASSESSMENT & PLAN NOTE
- see above note for SDH  - history of bilateral shoulder arthroplasty, right in September of 2020 and left in June of 2021  Patient has chronic bilateral shoulder pain which she notes is worse since her fall   -Dr Yassine Thompson saw patient back in August, discussing with patient surgery due to dislocated left shoulder replacement  At that time, it was felt that patient would not benefit ultimately from procedure   -left shoulder x-ray today shows persistent left shoulder hardware dislocation without any acute injury    Right shoulder x-ray shows no evidence of acute injury and does show prior shoulder replacement hardware intact  -follow-up with Dr Mart as an outpatient

## 2021-12-26 NOTE — Clinical Note
Case was discussed with Trauma and the patient's admission status was agreed to be Admission Status: inpatient status to the service of Dr Raleigh De La Vega

## 2021-12-26 NOTE — ED ATTENDING ATTESTATION
12/26/2021  ISierra MD, saw and evaluated the patient  I have discussed the patient with the resident/non-physician practitioner and agree with the resident's/non-physician practitioner's findings, Plan of Care, and MDM as documented in the resident's/non-physician practitioner's note, except where noted  All available labs and Radiology studies were reviewed  I was present for key portions of any procedure(s) performed by the resident/non-physician practitioner and I was immediately available to provide assistance  At this point I agree with the current assessment done in the Emergency Department  I have conducted an independent evaluation of this patient a history and physical is as follows:    ED Course  ED Course as of 12/27/21 1450   Sun Dec 26, 2021   [id number] Per Dr Sanna Parker radiology subdural hematoma; SAH; C spine OK ; Emergency Department Note- Aldair Rivera 67 y o  female MRN: [id number]    Unit/Bed#: ED 02 Encounter: [id number]    Ced Gonsalez is a 67 y o  female who presents with   Chief Complaint   Patient presents with   [facility] Fall     per EMS pt fell while at home, has no recollection of fall, unknown LOC, -thinners         History of Present Illness   HPI:  Ced Gonsalez is a 67 y o  female who presents for evaluation of:  Fall with head strike 1 hour prior to evaluation  Patient does not take any anticoagulant medications  She will occasionally take aspirin periodically  Patient did not remember the fall  She currently denies any sort of headache and neck pain  Patient is not recall    Review of Systems   Constitutional: Negative for chills and fever  HENT: Negative for congestion and rhinorrhea  Respiratory: Positive for cough and shortness of breath  Musculoskeletal: Positive for back pain and neck pain  Neurological: Negative for light-headedness and headaches  All other systems reviewed and are negative        Historical Information   Past Medical History:   Diagnosis Date    Acute blood loss anemia 9/9/2020    Aftercare following joint replacement 9/9/2020    Patient had right reversed total shoulder arthroplasty   Pain was controlled when he left the hospital       Anxiety     Arthritis     Depression     Disease of thyroid gland     HYPO    Femur neck fracture (HCC)     GERD (gastroesophageal reflux disease)     Hyperthyroidism     RESOLVED: [id number]    Osteoporosis     Polio     [address]peripheral [address]) [address] [address]     Right BBB/left ant fasc block     UTI (urinary tract infection)     Varicella infection     LAST ASSESSED: [id number]     Past Surgical History:   Procedure Laterality Date    APPENDECTOMY      HIP ARTHROPLASTY Left 11/27/2017    Procedure: REMOVAL IM NAIL CONVERSION TO TOTAL HIP ARTHROPLASTY POSTERIOR APPROACH;  Surgeon: Edwin Padron MD;  [facility];  Service: Orthopedics    HIP FRACTURE SURGERY      HIP SURGERY      both hips replaced;2013 left ,2014 right    JOINT REPLACEMENT Right     HIP TOTAL    VA CONV PREV HIP SURG TO TOT HIP Earnestine De La Rosa Left 10/4/2017    Procedure: Allison Carmelo removal of left hip;  Surgeon: Edwin Padron MD;  [facility];  Service: Orthopedics    VA RECONSTR TOTAL SHOULDER IMPLANT Right 9/2/2020    Procedure: ARTHROPLASTY SHOULDER REVERSE;  Surgeon: Maki Hansen MD;  [facility];  Service: Orthopedics    VA RECONSTR TOTAL SHOULDER IMPLANT Left 6/1/2021    Procedure: ARTHROPLASTY SHOULDER REVERSE;  Surgeon: Maki Hansen MD;  [facility];  Service: Orthopedics    REVISION TOTAL HIP ARTHROPLASTY Right     TONSILLECTOMY       Social History   Social History     Substance and Sexual Activity   Alcohol Use Not Currently     Social History     Substance and Sexual Activity   Drug Use Not Currently     Social History     Tobacco Use   Smoking Status Former Smoker   Smokeless Tobacco Never Used   Tobacco Comment    quit 20-30 years ago     Family History:   Family History Problem Relation Age of Onset    Rheum arthritis Mother     Rheum arthritis Sister     Prostate cancer Brother        Meds/Allergies   PTA meds:   Prior to Admission Medications   Prescriptions Last Dose Informant Patient Reported? Taking?    Ascorbic Acid, Vitamin C, (VITAMIN C) 100 MG tablet   Yes No   Sig: Take 400 mg by mouth daily   Multiple Vitamins-Minerals (CENTRUM SILVER PO)  Self Yes No   Sig: Take 1 tablet by mouth daily   Psyllium (METAMUCIL FIBER PO)  Self Yes No   Sig: Take 1 packet by mouth 3 (three) times a day    Vitamin E 400 units TABS  Self Yes No   Sig: Take 400 Units by mouth daily     docusate sodium (COLACE) 100 mg capsule   No No   Sig: Take 1 capsule (100 mg total) by mouth 2 (two) times a day   furosemide (LASIX) 20 mg tablet   No No   Sig: Take 1 tablet (20 mg total) by mouth daily   gabapentin (NEURONTIN) 300 mg capsule   No No   Sig: Take 1 capsule (300 mg total) by mouth 2 (two) times a day   hydroxychloroquine (PLAQUENIL) 200 mg tablet  Self Yes No   Si pill am 1/2 pill pm   levothyroxine 25 mcg tablet   No No   Sig: Take 1 tablet (25 mcg total) by mouth daily   methotrexate 2 5 MG tablet  Self No No   Sig: 3 tablets by mouth once a week on Thursday   nortriptyline (PAMELOR) 50 mg capsule   No No   Sig: TAKE 1 CAPSULE BY MOUTH EVERY DAY AT BEDTIME   predniSONE 5 mg tablet  Self Yes No   Sig: Take 5 mg by mouth daily   rOPINIRole (REQUIP) 1 mg tablet   No No   Sig: TAKE ONE TABLET BY MOUTH AT BEDTIME      Facility-Administered Medications: None     No Known Allergies    Objective   First Vitals:   Blood Pressure: 166/79 (21 0401)  Pulse: 83 (21)  Temperature: (!) 97 4 °F (36 3 °C) (213)  Temp Source: Oral (213)  Respirations: 20 (21)  SpO2: 100 % (21)    Current Vitals:   Blood Pressure: 166/79 (21 0401)  Pulse: 83 (21 0401)  Temperature: (!) 97 4 °F (36 3 °C) (213)  Temp Source: Oral (21 1853)  Respirations: 20 (21 040)  SpO2: 100 % (21)    No intake or output data in the 24 hours ending 21    Invasive Devices  Report    None                 Physical Exam  Vitals and nursing note reviewed  Constitutional:       General: She is not in acute distress  Appearance: Normal appearance  She is well-developed  HENT:      Head: Normocephalic and atraumatic  Right Ear: External ear normal       Left Ear: External ear normal       Nose: Nose normal       Mouth/Throat:      Pharynx: No oropharyngeal exudate  Eyes:      Conjunctiva/sclera: Conjunctivae normal       Pupils: Pupils are equal, round, and reactive to light  Cardiovascular:      Rate and Rhythm: Normal rate and regular rhythm  Pulmonary:      Effort: Pulmonary effort is normal  No respiratory distress  Abdominal:      General: Abdomen is flat  There is no distension  Musculoskeletal:         General: No deformity  Normal range of motion  Cervical back: Normal range of motion and neck supple  Skin:     General: Skin is warm and dry  Capillary Refill: Capillary refill takes less than 2 seconds  Neurological:      General: No focal deficit present  Mental Status: She is alert and oriented to person, place, and time  Mental status is at baseline  Coordination: Coordination normal    Psychiatric:         Mood and Affect: Mood normal          Behavior: Behavior normal          Thought Content: Thought content normal          Judgment: Judgment normal            Medical Decision Makin  Acute closed head trauma post fall: CTH; CTCS    No results found for this or any previous visit (from the past 36 hour(s))  CT head without contrast    (Results Pending)   CT spine cervical without contrast    (Results Pending)         Portions of the record may have been created with voice recognition software   Occasional wrong word or "sound a like" substitutions may have occurred due to the inherent limitations of voice recognition software  Read the chart carefully and recognize, using context, where substitutions have occurred  Critical Care Time  CriticalCare Time  Performed by: Christella Skiff, MD  Authorized by: Christella Skiff, MD     Critical care provider statement:     Critical care time (minutes):  32    Critical care time was exclusive of:  Separately billable procedures and treating other patients and teaching time    Critical care was necessary to treat or prevent imminent or life-threatening deterioration of the following conditions:  CNS failure or compromise  Comments:      66 YO F presents s/p fall; acute encephalopathy noted in ED; [facility] demonstrates both SDH and SAH; care and case d/w trauma critical care attending; plan trauma critical care admission for monitoring and serial neurological exams

## 2021-12-26 NOTE — ED PROVIDER NOTES
History  Chief Complaint   Patient presents with    Fall     per EMS pt fell while at home, has no recollection of fall, unknown LOC, -thinners     67 F who presents for fall  Patient states she does not remember the events of the fall  This happened at home, she lives with her sister  Patient sustained a small 1 cm laceration to R forehead that was actively bleeding on arrival  Besides the pain where she had her head strike, she is not reporting any pain anywhere else  She does state that she has had bilateral shoulder replacements, and that her left shoulder is displaced from a fall after surgery, without further surgical intervention after discussion with Orthopedics  She is not complaining numbness, weakness, tingling  She is not complaining any visual changes  No chest pain or shortness of breath  Review of systems otherwise negative  Prior to Admission Medications   Prescriptions Last Dose Informant Patient Reported? Taking?    Ascorbic Acid, Vitamin C, (VITAMIN C) 100 MG tablet   Yes No   Sig: Take 400 mg by mouth daily   Multiple Vitamins-Minerals (CENTRUM SILVER PO)  Self Yes No   Sig: Take 1 tablet by mouth daily   Psyllium (METAMUCIL FIBER PO)  Self Yes No   Sig: Take 1 packet by mouth 3 (three) times a day    Vitamin E 400 units TABS  Self Yes No   Sig: Take 400 Units by mouth daily     docusate sodium (COLACE) 100 mg capsule   No No   Sig: Take 1 capsule (100 mg total) by mouth 2 (two) times a day   furosemide (LASIX) 20 mg tablet   No No   Sig: Take 1 tablet (20 mg total) by mouth daily   gabapentin (NEURONTIN) 300 mg capsule   No No   Sig: Take 1 capsule (300 mg total) by mouth 2 (two) times a day   hydroxychloroquine (PLAQUENIL) 200 mg tablet  Self Yes No   Si pill am 1/2 pill pm   levothyroxine 25 mcg tablet   No No   Sig: Take 1 tablet (25 mcg total) by mouth daily   methotrexate 2 5 MG tablet  Self No No   Sig: 3 tablets by mouth once a week on Thursday   nortriptyline (PAMELOR) 50 mg capsule   No No   Sig: TAKE 1 CAPSULE BY MOUTH EVERY DAY AT BEDTIME   predniSONE 5 mg tablet  Self Yes No   Sig: Take 5 mg by mouth daily   rOPINIRole (REQUIP) 1 mg tablet   No No   Sig: TAKE ONE TABLET BY MOUTH AT BEDTIME      Facility-Administered Medications: None       Past Medical History:   Diagnosis Date    Acute blood loss anemia 9/9/2020    Aftercare following joint replacement 9/9/2020    Patient had right reversed total shoulder arthroplasty   Pain was controlled when he left the hospital       Anxiety     Arthritis     Depression     Disease of thyroid gland     HYPO    Femur neck fracture (HCC)     GERD (gastroesophageal reflux disease)     Hyperthyroidism     RESOLVED: [id number]    Osteoporosis     Polio     PVD [facility]peripheral vascular disease) ([facility] [address]     Right BBB/left ant fasc block     UTI (urinary tract infection)     Varicella infection     LAST ASSESSED: [id number]       Past Surgical History:   Procedure Laterality Date    APPENDECTOMY      HIP ARTHROPLASTY Left 11/27/2017    Procedure: REMOVAL IM NAIL CONVERSION TO TOTAL HIP ARTHROPLASTY POSTERIOR APPROACH;  Surgeon: Kyree Crowley MD;  [facility];  Service: Orthopedics    HIP FRACTURE SURGERY      HIP SURGERY      both hips replaced;2013 left ,2014 right    JOINT REPLACEMENT Right     HIP TOTAL    LA CONV PREV HIP SURG TO TOT HIP Jai Marten Left 10/4/2017    Procedure: Becca Beard removal of left hip;  Surgeon: Kyree Crowley MD;  [facility];  Service: Orthopedics    LA RECONSTR TOTAL SHOULDER IMPLANT Right 9/2/2020    Procedure: ARTHROPLASTY SHOULDER REVERSE;  Surgeon: Jennifer Estrada MD;  [facility];  Service: Orthopedics    LA RECONSTR TOTAL SHOULDER IMPLANT Left 6/1/2021    Procedure: ARTHROPLASTY SHOULDER REVERSE;  Surgeon: Jennifer Estrada MD;  [facility];  Service: Orthopedics    REVISION TOTAL HIP ARTHROPLASTY Right     TONSILLECTOMY         Family History   Problem Relation Age of Onset    Rheum arthritis Mother     Rheum arthritis Sister     Prostate cancer Brother      I have reviewed and agree with the history as documented  E-Cigarette/Vaping    E-Cigarette Use Never User      E-Cigarette/Vaping Substances    Nicotine No     THC No     CBD No     Flavoring No     Other No     Unknown No      Social History     Tobacco Use    Smoking status: Former Smoker    Smokeless tobacco: Never Used    Tobacco comment: quit 20-30 years ago   Vaping Use    Vaping Use: Never used   Substance Use Topics    Alcohol use: Not Currently    Drug use: Not Currently        Review of Systems   Constitutional: Negative for chills and fever  HENT: Negative for congestion, rhinorrhea and sore throat  Respiratory: Negative for cough and shortness of breath  Cardiovascular: Negative for chest pain and palpitations  Gastrointestinal: Negative for abdominal pain, constipation, diarrhea, nausea and vomiting  Genitourinary: Negative for difficulty urinating and flank pain  Musculoskeletal: Negative for arthralgias  Skin: Positive for wound  Neurological: Negative for dizziness, weakness, light-headedness and headaches  Psychiatric/Behavioral: Negative for agitation, behavioral problems and confusion  All other systems reviewed and are negative        Physical Exam  ED Triage Vitals   Temperature Pulse Respirations Blood Pressure SpO2   12/26/21 0403 12/26/21 0401 12/26/21 0401 12/26/21 0401 12/26/21 0401   (!) 97 4 °F (36 3 °C) 83 20 166/79 100 %      Temp Source Heart Rate Source Patient Position - Orthostatic VS BP Location FiO2 (%)   12/26/21 0403 12/26/21 0401 12/26/21 0704 12/26/21 0704 --   Oral Monitor Lying Right arm       Pain Score       12/26/21 0419       8             Orthostatic Vital Signs  Vitals:    12/26/21 0401 12/26/21 0704   BP: 166/79 154/68   Pulse: 83 65   Patient Position - Orthostatic VS:  Lying       Physical Exam  Constitutional: Appearance: She is well-developed  HENT:      Head: Normocephalic and atraumatic  Comments: Dried blood on the right side of the face, a small 1 cm wound is visualized on a hematoma, not actively bleeding at this time  Right Ear: There is impacted cerumen  Left Ear: Tympanic membrane normal    Cardiovascular:      Rate and Rhythm: Normal rate and regular rhythm  Heart sounds: Normal heart sounds  No murmur heard  No friction rub  Pulmonary:      Effort: Pulmonary effort is normal  No respiratory distress  Breath sounds: Normal breath sounds  No wheezing or rales  Abdominal:      General: Bowel sounds are normal  There is no distension  Palpations: Abdomen is soft  Tenderness: There is no abdominal tenderness  Musculoskeletal:         General: No swelling or tenderness  Normal range of motion  Cervical back: Normal range of motion and neck supple  Comments: Able to range all extremities well without pain  Patient does report some pain when ranging the left shoulder, however, states that this is normal for her and not new  Skin:     General: Skin is warm  Neurological:      Mental Status: She is alert and oriented to person, place, and time  Coordination: Coordination normal       Comments: Patient AAOx3  CN II-XII intact  Normal CFTs  5/5 strength in all extremities  Normal sensation in all extremities  Psychiatric:         Behavior: Behavior normal          Thought Content:  Thought content normal          Judgment: Judgment normal          ED Medications  Medications   oxyCODONE (ROXICODONE) IR tablet 2 5 mg (has no administration in time range)   tetanus-diphtheria-acellular pertussis (BOOSTRIX) IM injection 0 5 mL (0 5 mL Intramuscular Given 12/26/21 0419)   acetaminophen (TYLENOL) tablet 975 mg (975 mg Oral Given 12/26/21 0419)       Diagnostic Studies  Results Reviewed     Procedure Component Value Units Date/Time    CBC and differential [[id number]] Lab Status: No result Specimen: Blood     Comprehensive metabolic panel [[id number]]     Lab Status: No result Specimen: Blood                  CT head without contrast   Final Result by Iain Cohen MD (12/26 6236)      Acute intracranial hemorrhages, as described above  Please see discussion  Short-term follow-up recommended  I personally discussed this study with Zoie Gonzales on 12/26/2021 at 6:08 AM                            Workstation performed: [id number]         CT spine cervical without contrast   Final Result by Iain Cohen MD (12/26 0321)      No acute cervical spine fracture or traumatic malalignment  The visualized upper chest demonstrates biapical pleural-parenchymal abnormalities, right greater than left, bronchiectasis, right pleural thickening and chronic appearing right rib abnormalities, however, these findings are incompletely imaged on the    present study  Dedicated CT of the chest is recommended for further evaluation  This examination demonstrates findings for which imaging follow-up is recommended and was logged as such in [address] [place] [address] Rd             I personally discussed this study with Zoie Gonzales on 12/26/2021 at 6:09 AM                       Workstation performed: [id number]               Procedures  Laceration repair    Date/Time: 12/26/2021 7:23 AM  Performed by: Rakesh Benitez MD  Authorized by: Rakesh Benitez MD   Consent: Verbal consent obtained    Risks and benefits: risks, benefits and alternatives were discussed  Consent given by: patient  Patient understanding: patient states understanding of the procedure being performed  Patient identity confirmed: verbally with patient  Body area: head/neck  Location details: scalp  Laceration length: 1 cm  Foreign bodies: no foreign bodies  Tendon involvement: none  Nerve involvement: none      Procedure Details:  Preparation: Patient was prepped and draped in the usual sterile fashion  Irrigation solution: saline  Amount of cleaning: standard  Debridement: none  Degree of undermining: none  Skin closure: glue  Patient tolerance: patient tolerated the procedure well with no immediate complications            ED Course  ED Course as of 12/26/21 0725   Sun Dec 26, 2021   0640 Wound glued  3588 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 Unable to answer at this time Filed at: 12/26/2021 0706                [facility]  Number of Diagnoses or Management Options  Diagnosis management comments: Will perform CTH, CT C-spine  Normal neuro exam  SDH and SAH on CT  No thinners to reverse  Will discuss and admit to trauma service  Disposition  Final diagnoses:   None     ED Disposition     None      Follow-up Information    None         Patient's Medications   Discharge Prescriptions    No medications on file     No discharge procedures on file  PDMP Review       Value Time User    PDMP Reviewed  Yes 6/3/2021 12:26 PM Sachin Craft MD           ED Provider  Attending physically available and evaluated Geetha Rivera  FREDDIE managed the patient along with the ED Attending      Electronically Signed by         Farzana Lindsay MD  12/26/21 4450

## 2021-12-26 NOTE — ASSESSMENT & PLAN NOTE
- status post mechanical fall, positive head strike, no LOC, no AC/AP, abrasion with swelling noted above right eyebrow laterally  - Repeat CT head on shows improvement  HOT protocol discontinued  - neurosurgery consult appreciated  They evaluated the repeat CT head, noting improvement  They signed off and recommended outpatient follow-up on an as-needed basis only  -recommend continuing to hold anticoagulation and anti-platelet medications for 2 weeks  -patient is asymptomatic at this time, without headache or dizziness    Tolerated a diet and is feeling well overall   -follow-up with primary care doctor in 2 weeks and call Neurosurgery with additional concerns if needed

## 2021-12-26 NOTE — CONSULTS
[address] 1945, 67 y o  female MRN: [id number]  Unit/Bed#: ED 02 Encounter: [id number]  Primary Care Provider: Jaimie Moeller PA-C   Date and time admitted to hospital: 12/26/2021  3:58 AM    Inpatient consult to Neurosurgery  Consult performed by: CONNIE Christina  Consult ordered by: Jakub Tran MD          SDH (subdural hematoma) [facility])  Assessment & Plan  Acute right and interhemispheric SDH (5 mm), small left posterior fossa SAH, small hemorrhagic parenchymal contusions s/p fall  · Lost balance at home and fell to floor, + head stroke, no LOC  · No hx of AC/AP  · Neuro exam non-focal  GCS 15  AOx3  Noted with right frontal contusion and vaishali-orbital ecchymosis  Bilateral chronic shoulder pain  Imaging:   · CT head wo, 12/26/2021: read pending, per my review stable hemorrhagic contusions and SDH/SAH  · CT head wo, 12/26/2021: Acute intracranial hemorrhages, as described above  Please see discussion  Short-term follow-up recommended  Plan:  · Continue to follow neuro exam closely  · STAT CT head with decline in GCS > 2 pts in 1 hour  · Keppra per trauma team   · Pain control - patient complaining of headache  · Do not anticipate any neurosurgical intervention  · Mobilize with PT/OT  · DVT ppx: SCDs, ok for pharm DVT ppx  Neurosurgery will sign off  No follow up indicated  Please call back with any questions or concerns  History of Present Illness   HPI: Sebas Connor is a 67 y o  female with PMH including PVD, right bundle branch block, GERD, hypothyroidism, rheumatoid arthritis, SLE, depression with anxiety, restless legs syndrome, arthritis, status post bilateral shoulder arthroplasties, who presents for evaluation following a fall earlier this morning  She states she was in her house in her living room and she lost her balance and fell to the floor striking her head  She did not lose consciousness    She denies any use of anticoagulant antiplatelet medications  Currently she is complaining of a headache and bilateral shoulder pain  She states that her shoulder pain is chronic  She does not note any arm pain or weakness  She denies any numbness or tingling  She states she wants to go home  She denies any visual disturbance  She denies any ambulatory dysfunction although she states she falls frequently  Review of Systems   Constitutional: Negative  HENT: Negative  Respiratory: Negative  Cardiovascular: Negative  Gastrointestinal: Negative  Musculoskeletal: Positive for arthralgias and gait problem  Negative for neck pain and neck stiffness  Neurological: Positive for headaches  Negative for dizziness, tremors, seizures, syncope, facial asymmetry, speech difficulty, weakness, light-headedness and numbness  Historical Information   Past Medical History:   Diagnosis Date    Acute blood loss anemia 9/9/2020    Aftercare following joint replacement 9/9/2020    Patient had right reversed total shoulder arthroplasty   Pain was controlled when he left the hospital       Anxiety     Arthritis     Depression     Disease of thyroid gland     HYPO    Femur neck fracture (HCC)     GERD (gastroesophageal reflux disease)     Hyperthyroidism     RESOLVED: [id number]    Osteoporosis     Polio     PVD [facility]peripheral vascular disease) ([facility] [address] [facility]     Right BBB/left ant fasc block     UTI (urinary tract infection)     Varicella infection     LAST ASSESSED: [id number]     Past Surgical History:   Procedure Laterality Date    APPENDECTOMY      HIP ARTHROPLASTY Left 11/27/2017    Procedure: REMOVAL IM NAIL CONVERSION TO TOTAL HIP ARTHROPLASTY POSTERIOR APPROACH;  Surgeon: Bobby Epps MD;  [facility];  Service: Orthopedics    HIP FRACTURE SURGERY      HIP SURGERY      both hips replaced;2013 left ,2014 right    JOINT REPLACEMENT Right     HIP TOTAL    PA CONV PREV HIP SURG TO TOT HIP ARTHROPLAS Left 10/4/2017    Procedure: Lady Ken removal of left hip;  Surgeon: Carroll Saez MD;  [facility];  Service: Orthopedics    ME RECONSTR TOTAL SHOULDER IMPLANT Right 9/2/2020    Procedure: ARTHROPLASTY SHOULDER REVERSE;  Surgeon: Valarie Talamantes MD;  [facility];  Service: Orthopedics    ME RECONSTR TOTAL SHOULDER IMPLANT Left 6/1/2021    Procedure: ARTHROPLASTY SHOULDER REVERSE;  Surgeon: Valarie Talamantes MD;  [facility];  Service: Orthopedics    REVISION TOTAL HIP ARTHROPLASTY Right     TONSILLECTOMY       Social History     Substance and Sexual Activity   Alcohol Use Not Currently     Social History     Substance and Sexual Activity   Drug Use Not Currently     Social History     Tobacco Use   Smoking Status Former Smoker   Smokeless Tobacco Never Used   Tobacco Comment    quit 20-30 years ago     Family History   Problem Relation Age of Onset    Rheum arthritis Mother     Rheum arthritis Sister     Prostate cancer Brother        Meds/Allergies   all current active meds have been reviewed and current meds:   Current Facility-Administered Medications   Medication Dose Route Frequency    acetaminophen (TYLENOL) tablet 975 mg  975 mg Oral Q8H [address]    gabapentin (NEURONTIN) capsule 300 mg  300 mg Oral BID    levothyroxine tablet 25 mcg  25 mcg Oral Early Morning    methocarbamol (ROBAXIN) tablet 500 mg  500 mg Oral Q6H PRN    [START ON 12/30/2021] methotrexate tablet 7 5 mg  7 5 mg Oral Weekly    nortriptyline (PAMELOR) capsule 50 mg  50 mg Oral HS    ondansetron (ZOFRAN) injection 4 mg  4 mg Intravenous Q6H PRN    predniSONE tablet 5 mg  5 mg Oral Daily    rOPINIRole (REQUIP) tablet 1 mg  1 mg Oral HS     No Known Allergies    Objective   I/O       12/24 0701 12/25 0700 12/25 0701 12/26 0700 12/26 0701 12/27 0700           Unmeasured Urine Occurrence  1 x     Unmeasured Stool Occurrence  1 x           Physical Exam  Constitutional:       Appearance: She is well-developed  HENT:      Head: Normocephalic and atraumatic  Eyes:      Extraocular Movements: EOM normal       Pupils: Pupils are equal, round, and reactive to light  Cardiovascular:      Rate and Rhythm: Normal rate and regular rhythm  Pulmonary:      Effort: Pulmonary effort is normal       Breath sounds: Normal breath sounds  Abdominal:      Palpations: Abdomen is soft  Musculoskeletal:         General: Normal range of motion  Cervical back: Normal range of motion and neck supple  Skin:     General: Skin is warm and dry  Neurological:      General: No focal deficit present  Mental Status: She is alert and oriented to person, place, and time  Mental status is at baseline  Cranial Nerves: No cranial nerve deficit  Sensory: No sensory deficit  Motor: No weakness  Coordination: Coordination normal  Finger-Nose-Finger Test normal       Gait: Gait normal    Psychiatric:         Speech: Speech normal        Neurologic Exam     Mental Status   Oriented to person, place, and time  Oriented to person  Oriented to place  Oriented to time  Oriented to year, month and date  Registration: recalls 3 of 3 objects  Attention: normal  Concentration: normal    Speech: speech is normal   Level of consciousness: alert  Knowledge: good and consistent with education  Able to name object  Cranial Nerves   Cranial nerves II through XII intact  CN III, IV, VI   Pupils are equal, round, and reactive to light  Extraocular motions are normal    Right pupil: Size: 3 mm  Shape: regular  Reactivity: brisk  Consensual response: intact  Accommodation: intact  Left pupil: Size: 3 mm  Shape: regular  Reactivity: brisk  Consensual response: intact  Accommodation: intact  Nystagmus: none   Diplopia: none  Conjugate gaze: present    CN V   Right facial sensation deficit: none  Left facial sensation deficit: none    CN VII   Facial expression full, symmetric       CN VIII Hearing: intact    CN IX, X   Palate: symmetric    CN XI   Right sternocleidomastoid strength: normal  Left sternocleidomastoid strength: normal  Right trapezius strength: normal  Left trapezius strength: normal    CN XII   Tongue: not atrophic  Fasciculations: absent  Tongue deviation: none    Motor Exam   Muscle bulk: normal  Overall muscle tone: normal  Right arm pronator drift: absent  Left arm pronator drift: absent    Strength   Right deltoid: 5/5  Left deltoid: 5/5  Right biceps: 5/5  Left biceps: 5/5  Right triceps: 5/5  Left triceps: 5/5  Right quadriceps: 5/5  Left quadriceps: 5/5  Right hamstrin/5  Left hamstrin/5  Right anterior tibial: 5/5  Left anterior tibial: 5/5  Right posterior tibial: 5/5  Left posterior tibial: 5/5  Right peroneal: 5/5  Left peroneal: 5/5  Right gastroc: 5/5  Left gastroc: 5/5    Sensory Exam   Light touch normal    Proprioception normal      Gait, Coordination, and Reflexes     Coordination   Finger to nose coordination: normal    Tremor   Resting tremor: absent  Intention tremor: absent  Action tremor: absent      Vitals:Blood pressure 158/71, pulse 70, temperature (!) 97 4 °F (36 3 °C), temperature source Oral, resp  rate 16, SpO2 99 %, currently breastfeeding  ,There is no height or weight on file to calculate BMI       Lab Results:   Results from last 7 days   Lab Units 21  0937   WBC Thousand/uL 8 69   HEMOGLOBIN g/dL 10 7*   HEMATOCRIT % 34 6*   PLATELETS Thousands/uL 230   NEUTROS PCT % 79*   MONOS PCT % 8     Results from last 7 days   Lab Units 21  0752   POTASSIUM mmol/L 3 5   CHLORIDE mmol/L 108   CO2 mmol/L 28   BUN mg/dL 17   CREATININE mg/dL 0 63   CALCIUM mg/dL 8 7   ALK PHOS U/L 91   ALT U/L 24   AST U/L 25             Results from last 7 days   Lab Units 21  0752   INR  0 84     No results found for: TROPONINT  ABG:No results found for: PHART, QPH3NUU, PO2ART, DZI3IGA, Z8KJPZTC, BEART, SOURCE    Imaging Studies: I have personally reviewed pertinent reports  and I have personally reviewed pertinent films in PACS    XR chest portable    Result Date: 12/26/2021  Impression: No acute cardiopulmonary disease  No acute displaced fracture with redemonstration of dislocation of the left reverse shoulder arthroplasty, similar to August 2021  Workstation performed: [id number]     CT head without contrast    Result Date: 12/26/2021  Impression: Acute intracranial hemorrhages, as described above  Please see discussion  Short-term follow-up recommended  I personally discussed this study with Aracelis Gema on 12/26/2021 at 6:08 AM  Workstation performed: [id number]     CT spine cervical without contrast    Result Date: 12/26/2021  Impression: No acute cervical spine fracture or traumatic malalignment  The visualized upper chest demonstrates biapical pleural-parenchymal abnormalities, right greater than left, bronchiectasis, right pleural thickening and chronic appearing right rib abnormalities, however, these findings are incompletely imaged on the present study  Dedicated CT of the chest is recommended for further evaluation  This examination demonstrates findings for which imaging follow-up is recommended and was logged as such in Epic  I personally discussed this study with Aracelis Gema on 12/26/2021 at 6:09 AM   Workstation performed: [id number]       EKG, Pathology, and Other Studies: I have personally reviewed pertinent reports  and I have personally reviewed pertinent films in PACS    VTE Prophylaxis: Sequential compression device (Venodyne)     Code Status: Level 1 - Full Code  Advance Directive and Living Will:      Power of :    POLST:      Counseling / Coordination of Care  I spent 20 minutes with the patient

## 2021-12-27 ENCOUNTER — APPOINTMENT (INPATIENT)
Dept: RADIOLOGY | Facility: HOSPITAL | Age: 76
DRG: 087 | End: 2021-12-27
Payer: MEDICARE

## 2021-12-27 VITALS
HEIGHT: 58 IN | BODY MASS INDEX: 21.62 KG/M2 | DIASTOLIC BLOOD PRESSURE: 71 MMHG | WEIGHT: 103 LBS | OXYGEN SATURATION: 100 % | TEMPERATURE: 97.4 F | HEART RATE: 80 BPM | SYSTOLIC BLOOD PRESSURE: 162 MMHG | RESPIRATION RATE: 18 BRPM

## 2021-12-27 LAB
ANION GAP SERPL CALCULATED.3IONS-SCNC: 3 MMOL/L (ref 4–13)
BUN SERPL-MCNC: 13 MG/DL (ref 5–25)
CALCIUM SERPL-MCNC: 8.3 MG/DL (ref 8.3–10.1)
CHLORIDE SERPL-SCNC: 105 MMOL/L (ref 100–108)
CO2 SERPL-SCNC: 27 MMOL/L (ref 21–32)
CREAT SERPL-MCNC: 0.49 MG/DL (ref 0.6–1.3)
ERYTHROCYTE [DISTWIDTH] IN BLOOD BY AUTOMATED COUNT: 16.8 % (ref 11.6–15.1)
GFR SERPL CREATININE-BSD FRML MDRD: 94 ML/MIN/1.73SQ M
GLUCOSE SERPL-MCNC: 80 MG/DL (ref 65–140)
HCT VFR BLD AUTO: 36.6 % (ref 34.8–46.1)
HGB BLD-MCNC: 11.2 G/DL (ref 11.5–15.4)
MAGNESIUM SERPL-MCNC: 2.3 MG/DL (ref 1.6–2.6)
MCH RBC QN AUTO: 27.7 PG (ref 26.8–34.3)
MCHC RBC AUTO-ENTMCNC: 30.6 G/DL (ref 31.4–37.4)
MCV RBC AUTO: 90 FL (ref 82–98)
PHOSPHATE SERPL-MCNC: 2.8 MG/DL (ref 2.3–4.1)
PLATELET # BLD AUTO: 331 THOUSANDS/UL (ref 149–390)
PMV BLD AUTO: 8.4 FL (ref 8.9–12.7)
POTASSIUM SERPL-SCNC: 4.4 MMOL/L (ref 3.5–5.3)
RBC # BLD AUTO: 4.05 MILLION/UL (ref 3.81–5.12)
SODIUM SERPL-SCNC: 135 MMOL/L (ref 136–145)
WBC # BLD AUTO: 7.88 THOUSAND/UL (ref 4.31–10.16)

## 2021-12-27 PROCEDURE — 97167 OT EVAL HIGH COMPLEX 60 MIN: CPT

## 2021-12-27 PROCEDURE — 84100 ASSAY OF PHOSPHORUS: CPT

## 2021-12-27 PROCEDURE — 83735 ASSAY OF MAGNESIUM: CPT

## 2021-12-27 PROCEDURE — 97129 THER IVNTJ 1ST 15 MIN: CPT

## 2021-12-27 PROCEDURE — 36415 COLL VENOUS BLD VENIPUNCTURE: CPT

## 2021-12-27 PROCEDURE — 97163 PT EVAL HIGH COMPLEX 45 MIN: CPT

## 2021-12-27 PROCEDURE — 85027 COMPLETE CBC AUTOMATED: CPT

## 2021-12-27 PROCEDURE — 73030 X-RAY EXAM OF SHOULDER: CPT

## 2021-12-27 PROCEDURE — 80048 BASIC METABOLIC PNL TOTAL CA: CPT

## 2021-12-27 PROCEDURE — 99238 HOSP IP/OBS DSCHRG MGMT 30/<: CPT | Performed by: EMERGENCY MEDICINE

## 2021-12-27 RX ORDER — HEPARIN SODIUM 5000 [USP'U]/ML
5000 INJECTION, SOLUTION INTRAVENOUS; SUBCUTANEOUS EVERY 8 HOURS SCHEDULED
Status: DISCONTINUED | OUTPATIENT
Start: 2021-12-27 | End: 2021-12-27 | Stop reason: HOSPADM

## 2021-12-27 RX ORDER — LEVETIRACETAM 500 MG/1
500 TABLET ORAL EVERY 12 HOURS SCHEDULED
Qty: 13 TABLET | Refills: 0 | Status: SHIPPED | OUTPATIENT
Start: 2021-12-27 | End: 2022-01-25 | Stop reason: ALTCHOICE

## 2021-12-27 RX ORDER — LEVETIRACETAM 500 MG/1
500 TABLET ORAL EVERY 12 HOURS SCHEDULED
Status: DISCONTINUED | OUTPATIENT
Start: 2021-12-27 | End: 2021-12-27 | Stop reason: HOSPADM

## 2021-12-27 RX ORDER — OXYCODONE HYDROCHLORIDE 5 MG/1
2.5 TABLET ORAL EVERY 6 HOURS PRN
Qty: 15 TABLET | Refills: 0 | Status: SHIPPED | OUTPATIENT
Start: 2021-12-27 | End: 2022-01-06

## 2021-12-27 RX ORDER — METHOCARBAMOL 500 MG/1
500 TABLET, FILM COATED ORAL EVERY 6 HOURS PRN
Qty: 45 TABLET | Refills: 0 | Status: SHIPPED | OUTPATIENT
Start: 2021-12-27 | End: 2022-01-25 | Stop reason: SDUPTHER

## 2021-12-27 RX ORDER — ACETAMINOPHEN 325 MG/1
975 TABLET ORAL EVERY 8 HOURS SCHEDULED
Refills: 0
Start: 2021-12-27 | End: 2022-05-24

## 2021-12-27 RX ADMIN — PREDNISONE 5 MG: 10 TABLET ORAL at 08:42

## 2021-12-27 RX ADMIN — LEVETIRACETAM 500 MG: 500 TABLET, FILM COATED ORAL at 08:42

## 2021-12-27 RX ADMIN — ACETAMINOPHEN 975 MG: 325 TABLET, FILM COATED ORAL at 05:22

## 2021-12-27 RX ADMIN — OXYCODONE HYDROCHLORIDE 2.5 MG: 5 TABLET ORAL at 02:50

## 2021-12-27 RX ADMIN — GABAPENTIN 300 MG: 300 CAPSULE ORAL at 08:43

## 2021-12-27 RX ADMIN — OXYCODONE HYDROCHLORIDE 2.5 MG: 5 TABLET ORAL at 08:42

## 2021-12-27 RX ADMIN — HEPARIN SODIUM 5000 UNITS: 5000 INJECTION INTRAVENOUS; SUBCUTANEOUS at 08:42

## 2021-12-27 RX ADMIN — LEVOTHYROXINE SODIUM 25 MCG: 25 TABLET ORAL at 05:24

## 2021-12-27 RX ADMIN — ACETAMINOPHEN 975 MG: 325 TABLET, FILM COATED ORAL at 13:31

## 2021-12-27 NOTE — DISCHARGE INSTRUCTIONS
Neurosurgery discharge instructions following traumatic head bleed:      Do not take any blood thinning medications (ie  No Advil  No motrin  No ibuprofen  No Aleve  No Aspirin  No fishoil  No heparin  No antiplatelet / no anticoagulation medication) for 2 weeks   Refrain from activity that increases chance of trauma to head or falls  Recommend you take fall precaution   No strenuous activity or sports   Return to hospital Emergency Room if you experience worsening / new headache, nausea/vomiting, speech/vision change, seizure, confusion / mental status change, weakness, or other neurological changes

## 2021-12-27 NOTE — PLAN OF CARE
Problem: OCCUPATIONAL THERAPY ADULT  Goal: Performs self-care activities at highest level of function for planned discharge setting  See evaluation for individualized goals  Description: Treatment Interventions: ADL retraining,Functional transfer training,UE strengthening/ROM,Endurance training,Cognitive reorientation,Patient/family training,Equipment evaluation/education,Compensatory technique education,Continued evaluation,Energy conservation,Activityengagement          See flowsheet documentation for full assessment, interventions and recommendations  Note: Limitation: Decreased ADL status,Decreased endurance,Decreased self-care trans,Decreased high-level ADLs  Prognosis: Good  Assessment: Pt is a 67 y o  female admitted to [facility] on 12/26/2021 w/ a fall at home resulting in SDH  Pt  has a past medical history of Acute blood loss anemia (9/9/2020), Aftercare following joint replacement (9/9/2020), Anxiety, Arthritis, Depression, Disease of thyroid gland, Femur neck fracture ([facility] [address] ), GERD (gastroesophageal reflux disease), Hyperthyroidism, Osteoporosis, Polio, [facility] Right BBB/left ant fasc block, UTI (urinary tract infection), and Varicella infection  Pt with active OT orders and okay to mobilize per RN  Pt resides in a 2 story home with 3 VILMA and a FFSU  Pt lives with her sister and reports that they "help each other out"  Pt also has a HHA that comes 3x per week, and a local supportive niece  Pt reports that her sister is able to physically assist her, and that her neice will be coming to stay a few nights with them  Pt reports that she receives assistance with ADLS, IADLS and mobility with RW PTA  (+)   Currently pt is CGA for functional transfers and functional mobility, min A for UB ADLS and mod A for LB ADLS   Pt is limited at this time 2*: pain, endurance, activity tolerance, functional mobility, forward functional reach, functional standing tolerance, decreased I w/ ADLS/IADLS, cognitive impairments, decreased safety awareness and decreased insight into deficits  The following Occupational Performance Areas to address include: grooming, bathing/shower, toilet hygiene, dressing, functional mobility and clothing management  Based on the aforementioned OT evaluation, functional performance deficits, and assessments, pt has been identified as a high complexity evaluation  From OT standpoint, anticipate d/c home with family support and home OT  Pt to continue to benefit from acute immediate OT services to address the following goals 3-5x/week to  w/in 10-14 days:        OT Discharge Recommendation: Home with home health rehabilitation (home with increased social support)  OT - OK to Discharge:  (Pt would benefit from aditional night)

## 2021-12-27 NOTE — INCIDENTAL FINDINGS
The following findings require follow up:  Radiographic finding   Finding: The visualized upper chest demonstrates biapical pleural-parenchymal abnormalities, right greater than left, bronchiectasis, right pleural thickening and chronic appearing right rib abnormalities, however, these findings are incompletely imaged on the   present study  Dedicated CT of the chest is recommended for further evaluation  No acute displaced fracture with redemonstration of dislocation of the left reverse shoulder arthroplasty, similar to August 2021     Follow up required: family doctor   Follow up should be done within 2 week(s)

## 2021-12-27 NOTE — DISCHARGE SUMMARY
[facility] Trauma Survey/Discharge- Malorie Rivera 1945, 67 y o  female MRN: [id number]  Unit/Bed#: ED 02 Encounter: [id number]  Primary Care Provider: Melisa Lincoln PA-C   Date and time admitted to hospital: 12/26/2021  3:58 AM    SDH (subdural hematoma) (HCC)  Assessment & Plan  - status post mechanical fall, positive head strike, no LOC, no AC/AP, abrasion with swelling noted above right eyebrow laterally  - Repeat CT head on shows improvement  HOT protocol discontinued  - neurosurgery consult appreciated  They evaluated the repeat CT head, noting improvement  They signed off and recommended outpatient follow-up on an as-needed basis only  -recommend continuing to hold anticoagulation and anti-platelet medications for 2 weeks  -patient is asymptomatic at this time, without headache or dizziness  Tolerated a diet and is feeling well overall   -follow-up with primary care doctor in 2 weeks and call Neurosurgery with additional concerns if needed    Shoulder pain, bilateral  Assessment & Plan  - see above note for SDH  - history of bilateral shoulder arthroplasty, right in September of 2020 and left in June of 2021  Patient has chronic bilateral shoulder pain which she notes is worse since her fall   -Dr Lizandro Beth saw patient back in August, discussing with patient surgery due to dislocated left shoulder replacement  At that time, it was felt that patient would not benefit ultimately from procedure   -left shoulder x-ray today shows persistent left shoulder hardware dislocation without any acute injury    Right shoulder x-ray shows no evidence of acute injury and does show prior shoulder replacement hardware intact  -follow-up with Dr Mart as an outpatient    Depression  Assessment & Plan  - will continue with home nortriptyline    Other forms of systemic lupus erythematosus ([facility] [address] )  Assessment & Plan  - will continue with home prednisone    Rheumatoid arthritis involving multiple sites with positive rheumatoid factor ([facility] [address] )  Assessment & Plan  - will continue home medication prednisone and methotrexate    Ambulatory dysfunction  Assessment & Plan  - see above plan for SDH, patient reports that she falls somewhat frequently  -appreciate PT/OT evaluation recommendations  They recommend discharge home with home health  Case management has arranged for home health  Will discharge patient home today  Hypothyroidism due to Hashimoto's thyroiditis  Assessment & Plan  - continue with home medication levothyroxine and prednisone    RLS (restless legs syndrome)  Assessment & Plan  - continue with home medication ropinirole    Anxiety  Assessment & Plan  - continue with home medication              Medical Problems             Resolved Problems  Date Reviewed: 12/26/2021    None                Admission Date:   Admission Orders (From admission, onward)     Ordered        12/26/21 0750  Inpatient Admission  Once                        Admitting Diagnosis: Unspecified multiple injuries, initial encounter [T07  XXXA]    HPI: As documented by Dr Ghislaine Muniz who evaluated the patient on admission, " Fran Cortez is a 67 y o  female with PMH of PVD, right BBB, GERD, hypothyroidism, RA, SLE, depression with anxiety, restless leg syndrome, arthritis, presenting as a trauma evaluation after a mechanical fall earlier this morning, positive head strike, no LOC, no AC/AP  Patient cannot recall the exact series of events a reports early this morning she thinks she was in her living room when she lost her balance and fell to the ground, striking her head off the floor, denies LOC, does not take AC/AP, denies hitting any other part of her body during the fall  She reports she does fall somewhat frequently    On evaluation she reports headache and bilateral shoulder pain, found to have an abrasion with swelling above the right eyebrow laterally, found to have right SDH, SAH, possible small parenchymal contusions on imaging  Of note she does have recent history of bilateral shoulder arthroplasty, right shoulder in September of 2020 and left shoulder in June of 2021  GCS 15  Hemodynamically stable   "    Procedures Performed:   Orders Placed This Encounter   Procedures    Laceration repair       Summary of Hospital Course:  Patient was placed on the trauma service following fall mechanically when she sustained a right subdural hematoma and small falcine subarachnoid hemorrhage  She also had bilateral shoulder pain  She was seen by Neurosurgery who recommended repeat CT head which was completed on 12/26  This showed improvement in her multi compartmental intracranial hemorrhage  Neurosurgery at that time recommended signing off and follow up on an as-needed basis only  Patient remained a GCS of 15 and nonfocal   She had an headache on admission but this had resolved the following day  She did note persistent bilateral shoulder pain  She does have a history of bilateral shoulder replacements and has a history of left shoulder hardware dislocation  She saw Dr Bam Navarro back in August of 2021 for this  He recommended no surgical intervention at that time as he did not feel that it would significantly benefit her  Repeat bilateral shoulder x-rays shows the left shoulder hardware to be persistently displaced with the right shoulder hardware intact  She is recommended to follow up outpatient with Orthopedics as scheduled  She was up and ambulatory with PT OT who recommended discharge home with home health  Her pain was controlled she was tolerating a diet  She is medically stable for discharge today  Patient has complaints only of bilateral shoulder pain today  She does admit to chronic shoulder pain but does feel to slightly worsened swelling  She denies headache, dizziness, lightheadedness  She is tolerating a diet  She is motivated to go home today if possible    She has no new complaints on tertiary survey  Exam:  Vitals:    12/27/21 1200   BP: 162/71   Pulse: 80   Resp: 18   Temp:    SpO2: 100%     GEN:  No acute distress  HEENT:  +right forehead contusion, soft and stable; +pupils are 3 and reactive bilaterally  NEURO:  GCS 15, nonfocal exam  CV:  Regular rate and rhythm, no murmurs gallops or rubs  PULM:  Clear to auscultation bilaterally  GI:  Soft, nontender, nondistended  :  Voiding  MSK:  No edema, contusions or deformity  SKIN:  Pink, warm, dry      Significant Findings, Care, Treatment and Services Provided:   XR chest portable    Result Date: 12/26/2021  Impression: No acute cardiopulmonary disease  No acute displaced fracture with redemonstration of dislocation of the left reverse shoulder arthroplasty, similar to August 2021  Workstation performed: [id number]     XR shoulder 2+ vw left    Result Date: 12/27/2021  Impression: Recurrent anterior dislocation of reverse left shoulder arthroplasty Workstation performed: [id number]     XR shoulder 2+ vw right    Result Date: 12/27/2021  Impression: Intact appearing reverse right shoulder arthroplasty, unchanged Development of acute mildly displaced right-sided rib fractures Workstation performed: [id number]     CT head wo contrast    Result Date: 12/27/2021  Impression: Mildly improved right convexity subdural hemorrhage compared to earlier the same date  Similar small parenchymal and subarachnoid hemorrhage  No new findings  Recommend continued surveillance  Workstation performed: [id number]     CT head without contrast    Result Date: 12/26/2021  Impression: Acute intracranial hemorrhages, as described above  Please see discussion  Short-term follow-up recommended  I personally discussed this study with Lidya Banuelos on 12/26/2021 at 6:08 AM  Workstation performed: [id number]     CT spine cervical without contrast    Result Date: 12/26/2021  Impression: No acute cervical spine fracture or traumatic malalignment   The visualized upper chest demonstrates biapical pleural-parenchymal abnormalities, right greater than left, bronchiectasis, right pleural thickening and chronic appearing right rib abnormalities, however, these findings are incompletely imaged on the present study  Dedicated CT of the chest is recommended for further evaluation  This examination demonstrates findings for which imaging follow-up is recommended and was logged as such in Epic  I personally discussed this study with Anya Pascual on 12/26/2021 at 6:09 AM   Workstation performed: [id number]       Complications: none    Condition at Discharge: good         Discharge instructions/Information to patient and family:   See after visit summary for information provided to patient and family  Provisions for Follow-Up Care:  See after visit summary for information related to follow-up care and any pertinent home health orders  PCP: Aniya Ambriz PA-C    Disposition: Home    Planned Readmission: No    Discharge Statement   I spent 25 minutes discharging the patient  This time was spent on the day of discharge  I had direct contact with the patient on the day of discharge  Additional documentation is required if more than 30 minutes were spent on discharge  Discharge Medications:  See after visit summary for reconciled discharge medications provided to patient and family

## 2021-12-27 NOTE — OCCUPATIONAL THERAPY NOTE
Occupational Therapy Evaluation     Patient Name: Babs Mccullough  Today's Date: 12/27/2021  Problem List  Active Problems:    Anxiety    RLS (restless legs syndrome)    Hypothyroidism due to Hashimoto's thyroiditis    RBBB    Ambulatory dysfunction    Rheumatoid arthritis involving multiple sites with positive rheumatoid factor (HCC)    Other forms of systemic lupus erythematosus ([facility] )    Depression    Shoulder pain, bilateral    SDH (subdural hematoma) ([facility] )    Past Medical History  Past Medical History:   Diagnosis Date    Acute blood loss anemia 9/9/2020    Aftercare following joint replacement 9/9/2020    Patient had right reversed total shoulder arthroplasty   Pain was controlled when he left the hospital       Anxiety     Arthritis     Depression     Disease of thyroid gland     HYPO    Femur neck fracture (HCC)     GERD (gastroesophageal reflux disease)     Hyperthyroidism     RESOLVED: [id number]    Osteoporosis     Polio     PVD (peripheral vascular disease) ([facility] )     Right BBB/left ant fasc block     UTI (urinary tract infection)     Varicella infection     LAST ASSESSED: [id number]     Past Surgical History  Past Surgical History:   Procedure Laterality Date    APPENDECTOMY      HIP ARTHROPLASTY Left 11/27/2017    Procedure: REMOVAL IM NAIL CONVERSION TO TOTAL HIP ARTHROPLASTY POSTERIOR APPROACH;  Surgeon: Gisele Amaral MD;  [facility];  Service: Orthopedics    HIP FRACTURE SURGERY      HIP SURGERY      both hips replaced;2013 left ,2014 right    JOINT REPLACEMENT Right     HIP TOTAL    IA CONV PREV HIP SURG TO TOT HIP Sherl Matter Left 10/4/2017    Procedure: Alise Flair removal of left hip;  Surgeon: Gisele Amaral MD;  [facility];  Service: Orthopedics    IA RECONSTR TOTAL SHOULDER IMPLANT Right 9/2/2020    Procedure: ARTHROPLASTY SHOULDER REVERSE;  Surgeon: Mary Potts MD;  [facility];  Service: Orthopedics    IA [facility] Left 6/1/2021    Procedure: ARTHROPLASTY SHOULDER REVERSE;  Surgeon: Tata Ace MD;  [facility];  Service: Orthopedics    REVISION TOTAL HIP ARTHROPLASTY Right     TONSILLECTOMY               12/27/21 5530   OT Last Visit   OT Visit Date 12/27/21   Note Type   Note type Evaluation   Restrictions/Precautions   Other Precautions Cognitive;Multiple lines; Fall Risk;Pain   Pain Assessment   Pain Location/Orientation Location: Head   Hospital Pain Intervention(s) Repositioned; Ambulation/increased activity; Emotional support   Pain Rating: FLACC (Rest) - Face 0   Pain Rating: FLACC (Rest) - Legs 0   Pain Rating: FLACC (Rest) - Activity 0   Pain Rating: FLACC (Rest) - Cry 0   Pain Rating: FLACC (Rest) - Consolability 0   Score: FLACC (Rest) 0   Pain Rating: FLACC (Activity) - Face 1   Pain Rating: FLACC (Activity) - Legs 0   Pain Rating: FLACC (Activity) - Activity 0   Pain Rating: FLACC (Activity) - Cry 1   Pain Rating: FLACC (Activity) - Consolability 0   Score: FLACC (Activity) 2   Home Living   Type of Home House   Home Layout Two level;Stairs to enter with rails; Able to live on main level with bedroom/bathroom   Bathroom Shower/Tub Tub/shower unit   [facility]   Additional Comments Pt reports living in a 2 story home with 3 VILMA  Prior Function   Level of [place] Needs assistance with IADLs; Needs assistance with ADLs and functional mobility   Lives With [facility] Help From Home health; Family   ADL Assistance Needs assistance   IADLs Needs assistance   Falls in the last 6 months 1 to 4  (1, leading to this admission)   Vocational Retired   Lifestyle   Autonomy Pt reports that she receives assistance with ADLS, IADLS and mobility with RW PTA  (+)    Reciprocal Relationships Pt lives with her sister and reports that they "help each other out"   Pt also has a HHA that comes 3x per week, and a local supportive niece  Pt reports that her sister is able to physically assist her, and that her neice will be coming to stay a few nights with them  Service to Others Retired   Intrinsic Gratification Enjoys TV   ADL   Where Assessed Edge of bed   Eating Assistance [address]  [address] 3  Moderate Assistance   [address] 4  [address] [place] [address] 3  Moderate [address]  4  Minimal Assistance   Bed Mobility   Supine to Sit 3  Moderate assistance   Additional items Assist x 1; Increased time required;Verbal cues;LE management   Sit to Supine 3  Moderate assistance   Additional items Assist x 1; Increased time required;Verbal cues;LE management   Additional Comments After OT session pt in bed with all needs within reach  Transfers   Sit to Stand 4  Minimal assistance  (CGA)   Additional items Assist x 1   Stand to Sit 4  Minimal assistance  (CGA)   Additional items Assist x 1   Functional Mobility   Functional Mobility 4  Minimal assistance  (CGA)   Additional Comments Pt demonstrated household mobiltiy with CGA  Additional items Rolling walker   Balance   Static Sitting Fair +   Dynamic Sitting Fair   Static Standing Fair -   Dynamic Standing Fair -   Ambulatory Poor +   Activity Tolerance   Activity Tolerance Patient limited by fatigue;Patient limited by pain   Medical Staff Made Aware Seen with PT 2* medical complexity/ instability   Nurse Made Aware RN confirmed okay to see pt   Cognition   Overall Cognitive Status Impaired   Arousal/Participation Alert; Cooperative   Attention Attends with cues to redirect   Orientation Level Oriented X4   Memory Decreased recall of precautions   Following Commands Follows one step commands without difficulty   Comments Pt requires VC for safety and correct technique, but is pleasant and cooperative  Cognition Assessment Tools MOCA   Score 14   Assessment   Limitation Decreased ADL status; Decreased endurance;Decreased self-care trans;Decreased high-level ADLs   Prognosis Good   Assessment Pt is a 67 y o  female admitted to [facility] on 12/26/2021 w/ a fall at home resulting in SDH  Pt  has a past medical history of Acute blood loss anemia (9/9/2020), Aftercare following joint replacement (9/9/2020), Anxiety, Arthritis, Depression, Disease of thyroid gland, Femur neck fracture ([facility] ), GERD (gastroesophageal reflux disease), Hyperthyroidism, Osteoporosis, Polio, PVD (peripheral vascular disease) ([facility] ), Right BBB/left ant fasc block, UTI (urinary tract infection), and Varicella infection  Pt with active OT orders and okay to mobilize per RN  Pt resides in a 2 story home with 3 VILMA and a FFSU  Pt lives with her sister and reports that they "help each other out"  Pt also has a HHA that comes 3x per week, and a local supportive niece  Pt reports that her sister is able to physically assist her, and that her neice will be coming to stay a few nights with them  Pt reports that she receives assistance with ADLS, IADLS and mobility with RW PTA  (+)   Currently pt is CGA for functional transfers and functional mobility, min A for UB ADLS and mod A for LB ADLS  Pt is limited at this time 2*: pain, endurance, activity tolerance, functional mobility, forward functional reach, functional standing tolerance, decreased I w/ ADLS/IADLS, cognitive impairments, decreased safety awareness and decreased insight into deficits  The following Occupational Performance Areas to address include: grooming, bathing/shower, toilet hygiene, dressing, functional mobility and clothing management  Based on the aforementioned OT evaluation, functional performance deficits, and assessments, pt has been identified as a high complexity evaluation  From OT standpoint, anticipate d/c home with family support and home OT   Pt to continue to benefit from acute immediate OT services to address the following goals 3-5x/week to  w/in 10-14 days: Sonya Blend Goals   Patient Goals To feel better and return home    LTG Time Frame 10-   Long Term Goal #1 See goals below   Plan   Treatment Interventions ADL retraining;Functional transfer training;UE strengthening/ROM; Endurance training;Cognitive reorientation;Patient/family training;Equipment evaluation/education; Compensatory technique education;Continued evaluation; Energy conservation; Activityengagement   Goal Expiration Date 01/10/22   OT Frequency 3-5x/wk   Additional Treatment Session   Start Time 1350   End Time 5504   Treatment Assessment Pt seen this date for formal cognition assessment  Pt scored 16/30 indicating moderate cognitive impairment  Please see full assessment below  Pt instructed not to drive at this time, pt is agreeable to this      Additional Treatment Day 1   Recommendation   OT Discharge Recommendation Home with home health rehabilitation  (home with increased social support)   OT - OK to Discharge   (Pt would benefit from aditional night)   AM-PAC Daily Activity Inpatient   Lower Body Dressing 2   Bathing 2   Toileting 3   Upper Body Dressing 3   Grooming 4   Eating 4   Daily Activity Raw Score 18   Daily Activity Standardized Score (Calc for Raw Score >=11) 38 66   AM-PAC Applied Cognition Inpatient   Following a Speech/Presentation 2   Understanding Ordinary Conversation 4   Taking Medications 2   Remembering Where Things Are Placed or Put Away 3   Remembering List of 4-5 Errands 2   Taking Care of Complicated Tasks 2   Applied Cognition Raw Score 15   Applied Cognition Standardized Score 33 54   Modified [place] Scale   Modified [place] Scale 4             GOALS    1) Pt will increase activity tolerance to G for 30 min txment sessions    2) Pt will complete UB/LB dressing/self care w/ mod I using adaptive device and DME as needed    3) Pt will complete bathing w/ Mod I w/ use of AE and DME as needed    4) Pt will complete toileting w/ mod I w/ G hygiene/thoroughness using DME as needed    5) Pt will improve functional transfers to Mod I on/off all surfaces using DME as needed w/ G balance/safety     6) Pt will improve functional mobility during ADL/IADL/leisure tasks to Mod I using DME as needed w/ G balance/safety     7) Pt will demonstrate G carryover of pt/caregiver education and training as appropriate  8) Pt will independently identify and utilize 2-3 coping strategies to increase positive affect and promote overall well-being  9) Pt will engage in ongoing cognitive assessment w/ G participation to assist w/ safe d/c planning/recommendations            MOCA Assessment     PT SEEN FOR MERLIN COGNITIVE ASSESSMENT  SCORED  14/30 INDICATING MODERATE COGNITIVE IMPAIRMENT FOR AGE/EDUCATION  SCORES ARE AS FOLLOWS:    VISUOSPATIAL/EXECUTIVE FUNCTION: 2/5  Pt was not able to complete trail  Pt was not able to copy cube  Pt was able to draw a clock, not ablt to accurately place the numbers, and able to properly place the hands at ten past eleven  NAMIN/3  Pt able to name 2/3 animals  ATTENTION: /  Pt was able to repeat sequence of numbers forwards but not backwards  Pt was not able to attend to sequence of letters  Pt was able to correctly subtract 7 from 100 0/5 times  LANGUAGE: 2/3  Pt was able to repeat sentences back to therapist 1/2 times  Pt was able to produce 12 of words starting with the letter F in 1 minute  ABSTRACTION: 0/2  Pt was able to identify the similarity of two items x2 trials  DELAYED RECALL: 0/5  Pt recalled 0/5 words without cues  Pt recalled 2/5 words with category cue  Pt recalled 3/5 words with multiple choice options  ORIENTATION:   Pt is oriented to date, month, year, day, place and city  Pt received one additional point for level of education      Olivier Newman, FELICITAS, OTR/L

## 2021-12-27 NOTE — CASE MANAGEMENT
Case Management Progress Note    Patient name Sebas Connor  Location ED 02/ED 02 MRN [id number]  : 1945 Date 2021       LOS (days): 1  Geometric Mean LOS (GMLOS) (days):   Days to GMLOS:        OBJECTIVE:        Current admission status: Inpatient  Preferred Pharmacy:   [facility] Sena Lerner [address]   [facility] [address]  Phone: [phone] Fax: [phone]    [address] [place] [address] 7[address] [facility] [address] [place] [address]  Phone: [phone] Fax: [phone]    [address] [place] [address],[address], [place] - [address] [facility] [address] [facility] 94 [facility] 38 210 [facility]  Phone: [phone] Fax: [phone]    [facility] [address], [place] - [address] [facility]  [place] [address] [address]  Phone: [phone] Fax: [phone]    [facility], [address] [address] DR Ahn  [facility] 605 [facility]  Phone: [phone] Fax: [phone]    Primary Care Provider: Jaimie Moeller PA-C    Primary Insurance: MEDICARE  Secondary Insurance:     PROGRESS NOTE:        Pt accepted by [facility]

## 2021-12-27 NOTE — PLAN OF CARE
Problem: PHYSICAL THERAPY ADULT  Goal: Performs mobility at highest level of function for planned discharge setting  See evaluation for individualized goals  Description: Treatment/Interventions: Functional transfer training,LE strengthening/ROM,Elevations,Therapeutic exercise,Endurance training,Equipment eval/education,Bed mobility,Gait training,Spoke to nursing,OT  Equipment Recommended: Manfred Herrera (pt already owns)       See flowsheet documentation for full assessment, interventions and recommendations  Note: Prognosis: Fair  Problem List: Decreased strength,Decreased endurance,Impaired balance,Decreased mobility,Pain,Decreased cognition,Decreased safety awareness  Assessment: Pt is a 67 y o  female seen for PT evaluation s/p admit [facility] 12/26/[facility]21  Pt was admitted with a primary dx of: subdural hematoma s/p fall  No neurosurgical intervention at this time  X-rays negative for acute trama  PT now consulted for assessment of mobility and d/c needs  Pt with Up in chair, PT evaluation orders  Pts current comorbidities effecting treatment include: b/l shoulder replacements, arthritis, depression, disease of thyroid gland, history of femur fracture, polio, osteoporosis, hyperthyroidism  Pts current clinical presentation is Unstable/ Unpredictable (high complexity) due to Ongoing medical management for primary dx, Decreased activity tolerance compared to baseline, Fall risk, Increased assistance needed from caregiver at current time, Trending lab values  Pt is a questionable historian, but reports being mostly I with ADLs PTA and ambulating with RW  Pt lives with her sister in [facility] with 2-3 VILMA, [address]  Pt reports that her and her sister help each other and that their niece lives nearby and can help as needed  Upon evaluation, pt currently is requiring modA for bed mobility; S/CGA for transfers and S/CGA for ambulation 30 ft w/ RW   Pt presents at PT eval functioning below baseline and currently w/ overall mobility deficits 2* to: BLE weakness, impaired balance, decreased endurance, gait deviations, pain, decreased activity tolerance compared to baseline, decreased functional mobility tolerance compared to baseline, decreased safety awareness, fall risk, decreased cognition  Pt currently at a fall risk 2* to impairments listed above  Pt will continue to benefit from skilled acute PT interventions to address stated impairments; to maximize functional mobility; for ongoing pt/ family training; and DME needs  At conclusion of PT session pt returned BTB with phone and call bell within reach  Pt denies any further questions at this time  The patient's AM-PAC Basic Mobility Inpatient Short Form Raw Score is 16  A Raw score of less than or equal to 16 suggests the patient may benefit from discharge to post-acute rehabilitation services  However, anticipate that this is close to pt's baseline and that with improved pain levels pt's mobility will improve  Recommend home with HHPT and increased social support (pt reports her niece can stay with her for a few nights upon d/c) upon hospital D/C  Barriers to Discharge: Decreased caregiver support        PT Discharge Recommendation: Home with home health rehabilitation (+ increased social support from niece)          See flowsheet documentation for full assessment

## 2021-12-27 NOTE — CASE MANAGEMENT
Case Management Assessment & Discharge Planning Note    Patient name Micheal Moore  Location ED 02/ED 02 MRN [id number]  : 1945 Date 2021       Current Admission Date: 2021  Current Admission Diagnosis:SDH (subdural hematoma) [facility])   Patient Active Problem List    Diagnosis Date Noted    [facility]    Venous insufficiency 2021    Shoulder pain, bilateral 2021    Failed orthopedic implant [facility] ) 2021    Aftercare following left shoulder joint replacement surgery 2021    S/P reverse total shoulder arthroplasty, left 2021    Rupture long head biceps tendon, left, initial encounter 2021    Acquired subluxation of left shoulder 2021    Depression 2020    Status post reverse total arthroplasty of right shoulder 2020    Other forms of systemic lupus erythematosus ([facility] ) 2020    Dyspepsia 2019    Vitamin D deficiency 2019    Other insomnia 2019    Chronic pain syndrome 2019    Rheumatoid arthritis involving multiple sites with positive rheumatoid factor ([facility] ) 2019    Ambulatory dysfunction 2018    Closed compression fracture of L3 lumbar vertebra 2018    S/P total hip arthroplasty 2017    Anxiety 2017    RLS (restless legs syndrome) 2017    RBBB 2017    Age-related osteoporosis without current pathological fracture 2017    Hypothyroidism due to Hashimoto's thyroiditis 2016      LOS (days): 1  Geometric Mean LOS (GMLOS) (days):   Days to GMLOS:     OBJECTIVE:    Risk of Unplanned Readmission Score: 17         Current admission status: Inpatient       Preferred Pharmacy:   [facility] Sena Lerner [address]   [facility] [address] [place] [address]  Phone: [phone] Fax: [facility] 70 [address] [facility] 281, [address] [place] [address]  Phone: [phone] Fax: [phone]    [address] [place],[address], [facility] [address] [facility] [address]ST [address] [address] [address]  Phone: [phone] Fax: [phone]    [facility] [address], [place] - [address] [address] [facility],[address]  [place] [address] [address]  Phone: [phone] Fax: [phone] 800 [facility], [address] [place] [address] DR Ahn [facility]  Phone: [phone] Fax: [phone]    Primary Care Provider: Bety Espinosa PA-C    Primary Insurance: MEDICARE  Secondary Insurance:     ASSESSMENT:  Active Health Care Agents    There are no active Health Care Agents on file                                                          DISCHARGE DETAILS:       Freedom of Choice: Yes     CM contacted family/caregiver?: Yes  Were Treatment Team discharge recommendations reviewed with patient/caregiver?: Yes  Did patient/caregiver verbalize understanding of patient care needs?: Yes  Were patient/caregiver advised of the risks associated with not following Treatment Team discharge recommendations?: Yes    Contacts  Patient Contacts: Sylkinjal Miller Tona/Sister  Relationship to Patient[de-identified] Family  Contact Method: Phone  Phone Number: [phone]  Reason/Outcome: Continuity of [facility]         Is the patient interested in [facility] at discharge?: Yes  [address] requested[de-identified] Kelsy  [address] Name[de-identified] [facility] Provider[de-identified] PCP  Home Health Services Needed[de-identified] Strengthening/Theraputic Exercises to Improve Function,Evaluate Functional Status and Safety,Gait/ADL Training  Homebound Criteria Met[de-identified] Uses an Assist Device (i e  cane, walker, etc),Requires the Assistance of Another Person for Safe Ambulation or to Leave the Home  Supporting Clincal Findings[de-identified] Fatigues Easliy in Short Distances,Limited Endurance    DME Referral Provided  Referral made for DME?: No              Treatment Team Recommendation: [facility]  Discharge Destination Plan[de-identified] Home with [address] cleared for a home d/c with VNA  NO preference in terms of VNA  CM placed multiple referrals  Pt's family to transport home  No other needs at this time  CM reviewed d/c planning process including the following: identifying help at home, patient preference for d/c planning needs, Discharge Lounge, [facility] Meds to Bed program, availability of treatment team to discuss questions or concerns patient and/or family may have regarding understanding medications and recognizing signs and symptoms once discharged  CM also encouraged patient to follow up with all recommended appointments after discharge  Patient advised of importance for patient and family to participate in managing patients medical well being

## 2021-12-27 NOTE — PHYSICAL THERAPY NOTE
Physical Therapy Evaluation    Patient's Name: Neelam Rivera    Admitting Diagnosis  Unspecified multiple injuries, initial encounter [T07  XXXA]    Problem List  Patient Active Problem List   Diagnosis    Anxiety    RLS (restless legs syndrome)    S/P total hip arthroplasty    Hypothyroidism due to Hashimoto's thyroiditis    Age-related osteoporosis without current pathological fracture    RBBB    Ambulatory dysfunction    Closed compression fracture of L3 lumbar vertebra    Rheumatoid arthritis involving multiple sites with positive rheumatoid factor (HCC)    Chronic pain syndrome    Other insomnia    Vitamin D deficiency    Dyspepsia    Other forms of systemic lupus erythematosus ([facility] [address] )    Status post reverse total arthroplasty of right shoulder    Depression    Acquired subluxation of left shoulder    Rupture long head biceps tendon, left, initial encounter    S/P reverse total shoulder arthroplasty, left    Aftercare following left shoulder joint replacement surgery    Shoulder pain, bilateral    Failed orthopedic implant ([facility] [facility]    Venous insufficiency    SDH (subdural hematoma) ([facility] [facility] )       Past Medical History  Past Medical History:   Diagnosis Date    Acute blood loss anemia 9/9/2020    Aftercare following joint replacement 9/9/2020    Patient had right reversed total shoulder arthroplasty   Pain was controlled when he left the hospital       Anxiety     Arthritis     Depression     Disease of thyroid gland     HYPO    Femur neck fracture (HCC)     GERD (gastroesophageal reflux disease)     Hyperthyroidism     RESOLVED: [id number]    Osteoporosis     Polio     PVD (peripheral vascular disease) ([facility] [address] [facility]     Right BBB/left ant fasc block     UTI (urinary tract infection)     Varicella infection     LAST ASSESSED: [id number]       Past Surgical History  Past Surgical History:   Procedure Laterality Date    APPENDECTOMY      HIP ARTHROPLASTY Left 11/27/2017    Procedure: REMOVAL IM NAIL CONVERSION TO TOTAL HIP ARTHROPLASTY POSTERIOR APPROACH;  Surgeon: Sharon Jackson MD;  [facility];  Service: Orthopedics    HIP FRACTURE SURGERY      HIP SURGERY      both hips replaced;2013 left ,2014 right    JOINT REPLACEMENT Right     HIP TOTAL    WI CONV PREV HIP SURG TO TOT HIP ARTHROPLAS Left 10/4/2017    Procedure: Fonseca Caprice removal of left hip;  Surgeon: Sharon Jackson MD;  [facility];  Service: Orthopedics    WI RECONSTR TOTAL SHOULDER IMPLANT Right 9/2/2020    Procedure: ARTHROPLASTY SHOULDER REVERSE;  Surgeon: Davis Elizondo MD;  [facility];  Service: Orthopedics    WI RECONSTR TOTAL SHOULDER IMPLANT Left 6/1/2021    Procedure: ARTHROPLASTY SHOULDER REVERSE;  Surgeon: Davis Elizondo MD;  [facility];  Service: Orthopedics    REVISION TOTAL HIP ARTHROPLASTY Right     TONSILLECTOMY            12/27/21 1349   PT Last Visit   PT Visit Date 12/27/21   Note Type   Note type Evaluation   Pain Assessment   Pain Assessment Tool FLACC   Pain Location/Orientation Location: [facility]   Hospital Pain Intervention(s) Repositioned; Ambulation/increased activity   Pain Rating: FLACC (Rest) - Face 0   Pain Rating: FLACC (Rest) - Legs 0   Pain Rating: FLACC (Rest) - Activity 0   Pain Rating: FLACC (Rest) - Cry 0   Pain Rating: FLACC (Rest) - Consolability 0   Score: FLACC (Rest) 0   Pain Rating: FLACC (Activity) - Face 1   Pain Rating: FLACC (Activity) - Legs 0   Pain Rating: FLACC (Activity) - Activity 0   Pain Rating: FLACC (Activity) - Cry 0   Pain Rating: FLACC (Activity) - Consolability 0   Score: FLACC (Activity) 1   Restrictions/Precautions   Other Precautions Cognitive; Fall Risk;Pain   Home Living   Type of [facility] Two level;Stairs to enter with rails; Able to live on main level with bedroom/bathroom  (2 VILMA)   Home Equipment Walker   Additional Comments Pt lives in a [facility] with 2-3 VILMA, FFSU available   Reports using RW for ambulation PTA   Prior Function   Level of [place] Independent with ADLs and functional mobility; Needs assistance with IADLs   Lives With Family  (sister)   Receives Help From Family  (niece)   ADL Assistance Independent   IADLs Needs assistance   Falls in the last 6 months 1 to 4  (pt reports this is her only recent fall)   Comments Pt lives with her sister, reporting that they "take care of each other " Reports that her niece lives locally and could stay with her for a few nights upon d/c if needed   Cognition   Overall Cognitive Status Impaired   Arousal/Participation Alert   Orientation Level Oriented to person;Oriented to place;Oriented to situation   Memory Decreased recall of precautions   Following Commands Follows one step commands without difficulty   Comments pt is pleasant and cooperative, decreased insight into current deficits   RLE Assessment   RLE Assessment X  (~3+/5, hx of polio)   LLE Assessment   LLE Assessment X  (~3+/5, hx of polio)   Bed Mobility   Supine to Sit 3  Moderate assistance   Additional items Assist x 1;HOB elevated; Bedrails; Increased time required   Sit to Supine 3  Moderate assistance   Additional items Assist x 1; Increased time required;Verbal cues;LE management   Transfers   Sit to Stand 5  Supervision  (CGA)   Additional items Assist x 1; Increased time required   Stand to Sit 5  Supervision  (CGA)   Additional items Assist x 1; Increased time required   Additional Comments Transfers with RW   Ambulation/Elevation   Gait pattern Excessively slow; Short stride;Decreased foot clearance;Shuffling;Narrow JEOVANY  (RLE resting in IR)   Gait Assistance 5  Supervision  (CGA)   Additional items Assist x 1   Assistive Device Rolling walker   Distance 30ft- overall distance limited by pain   Balance   Static Sitting Fair +   Dynamic Sitting Fair   Static Standing Fair -   Dynamic Standing Fair -  (w/ RW)   Ambulatory Fair -  (w/ RW)   Activity Tolerance   Activity Tolerance Patient limited by pain   Medical Staff Made Aware Seen with OT 2* pt's medical complexity and multiple comorbidities  PT focus on functional transfers, gait, and endurance   Nurse Made Aware yes   Assessment   Prognosis Fair   Problem List Decreased strength;Decreased endurance; Impaired balance;Decreased mobility;Pain;Decreased cognition;Decreased safety awareness   Assessment Pt is a 67 y o  female seen for PT evaluation s/p admit to [facility] on 12/26/2021  Pt was admitted with a primary dx of: subdural hematoma s/p fall  No neurosurgical intervention at this time  X-rays negative for acute trama  PT now consulted for assessment of mobility and d/c needs  Pt with Up in chair, PT evaluation orders  Pts current comorbidities effecting treatment include: b/l shoulder replacements, arthritis, depression, disease of thyroid gland, history of femur fracture, polio, osteoporosis, hyperthyroidism  Pts current clinical presentation is Unstable/ Unpredictable (high complexity) due to Ongoing medical management for primary dx, Decreased activity tolerance compared to baseline, Fall risk, Increased assistance needed from caregiver at current time, Trending lab values  Pt is a questionable historian, but reports being mostly I with ADLs PTA and ambulating with RW  Pt lives with her sister in [facility] with 2-3 VILMA, [address]  Pt reports that her and her sister help each other and that their niece lives nearby and can help as needed  Upon evaluation, pt currently is requiring modA for bed mobility; S/CGA for transfers and S/CGA for ambulation 30 ft w/ RW  Pt presents at PT eval functioning below baseline and currently w/ overall mobility deficits 2* to: BLE weakness, impaired balance, decreased endurance, gait deviations, pain, decreased activity tolerance compared to baseline, decreased functional mobility tolerance compared to baseline, decreased safety awareness, fall risk, decreased cognition   Pt currently at a fall risk 2* to impairments listed above  Pt will continue to benefit from skilled acute PT interventions to address stated impairments; to maximize functional mobility; for ongoing pt/ family training; and DME needs  At conclusion of PT session pt returned BTB with phone and call bell within reach  Pt denies any further questions at this time  The patient's AM-PAC Basic Mobility Inpatient Short Form Raw Score is 16  A Raw score of less than or equal to 16 suggests the patient may benefit from discharge to post-acute rehabilitation services  However, anticipate that this is close to pt's baseline and that with improved pain levels pt's mobility will improve  Recommend home with HHPT and increased social support (pt reports her niece can stay with her for a few nights upon d/c) upon hospital D/C  Barriers to Discharge Decreased caregiver support   Goals   Patient Goals to have less pain   STG Expiration Date 01/10/22   Short Term Goal #1 In 10-14 days pt will be able to: 1  Demonstrate ability to perform all aspects of bed mobility independently to increase functional independence  2  Perform functional transfers at mod I level to facilitate safe return to previous living environment  3   Ambulate 200 ft with RW at mod I level with stable vitals to improve safety with household distances and reduce fall risk  4  Climb 2+12 steps with HR at mod I level to simulate home environment  5  Improve LE strength grades by 1 to increase ease of functional mobility with transfers and gait  6  Pt will demonstrate improved balance by one grade order to decrease risk of falls  PT Treatment Day 0   Plan   Treatment/Interventions Functional transfer training;LE strengthening/ROM; Elevations; Therapeutic exercise; Endurance training;Equipment eval/education; Bed mobility;Gait training;Spoke to nursing;OT   PT Frequency 3-5x/wk   Recommendation   PT Discharge Recommendation Home with home health rehabilitation  (+ increased social support from niece) Equipment Recommended Walker  (pt already owns)   Additional Comments Pt reports that her niece can stay with her for a few nights upon d/c   AM-PAC Basic Mobility Inpatient   Turning in Bed Without Bedrails 3   Lying on Back to Sitting on Edge of Flat Bed 2   Moving Bed to Chair 3   Standing Up From Chair 3   Walk in Room 3   Climb 3-5 Stairs 2   Basic Mobility Inpatient Raw Score 16   Basic Mobility Standardized Score 38 32   Highest Level Of Mobility   -[facility] Goal 5: Stand one or more mins   -[facility] Highest Level of Mobility 6: Walk 10 steps or more   -[facility] Goal Achieved Yes   Modified Spink Scale   Modified Cari Scale 3   End of Consult   Patient Position at End of Consult Supine; All needs within reach     [place] Conn, PT, DPT

## 2021-12-28 ENCOUNTER — TRANSITIONAL CARE MANAGEMENT (OUTPATIENT)
Dept: INTERNAL MEDICINE CLINIC | Facility: CLINIC | Age: 76
End: 2021-12-28

## 2021-12-29 ENCOUNTER — DOCUMENTATION (OUTPATIENT)
Dept: SOCIAL WORK | Facility: HOSPITAL | Age: 76
End: 2021-12-29

## 2022-01-05 ENCOUNTER — TELEPHONE (OUTPATIENT)
Dept: INTERNAL MEDICINE CLINIC | Facility: CLINIC | Age: 77
End: 2022-01-05

## 2022-01-05 NOTE — TELEPHONE ENCOUNTER
Shanelle from [facility] called to just advise us patient does not want to see her at this time  This is an fyi

## 2022-01-06 NOTE — PHYSICIAN ADVISOR
Current patient class: Inpatient  The patient is currently on Hospital Day: 2 at [address] [place] [address]      The patient was admitted to the hospital at  7:49 AM on 12/26/21 for the following diagnosis:  Unspecified multiple injuries, initial encounter [T07  XXXA]       There was documentation in the medical record of an expected length of stay of at least 2 midnights  The patient was therefore expected to satisfy the 2 midnight benchmark and given the 2 midnight presumption was appropriate for INPATIENT ADMISSION  Given this expectation of a satisfying stay, CMS instructs us that the patient is most often appropriate for inpatient admission under part A provided medical necessity is documented in the chart  After review of the relevant documentation, labs, vital signs and test results, the patient is appropriate for INPATIENT ADMISSION  Admission to the hospital as an inpatient is a complex decision making process which requires the practitioner to consider the patients presenting complaint, history and physical examination and all relevant testing  With this in mind, in this case, the patient was deemed appropriate for INPATIENT ADMISSION  After review of the documentation and testing available at the time of the admission, I concur with this clinical determination of medical necessity  For the reason noted, the patient was discharged before reaching 2 midnights as an inpatient  Unexpected early recovery  Rationale is as follows: The patient is a 67 yrs old Female who presented to the ED at 12/26/2021  3:58 AM with a chief complaint of Fall (per EMS pt fell while at home, has no recollection of fall, unknown LOC, -thinners)  Patient was admitted following mechanical fall when she sustained a right subdural hematoma and small falcine subarachnoid hemorrhage  She also had bilateral shoulder pain    She was seen by Neurosurgery who recommended repeat CT head which was completed on 12/26  This showed improvement in her multi compartmental intracranial hemorrhage  She had an headache on admission but this had resolved the following day  She did note persistent bilateral shoulder pain  She does have a history of bilateral shoulder replacements and has a history of left shoulder hardware dislocation  Repeat bilateral shoulder x-rays showed the left shoulder hardware to be persistently displaced with the right shoulder hardware intact  She is recommended to follow up outpatient with Orthopedics as scheduled  She was up and ambulatory with PT OT who recommended discharge home with home health  Given her stable subdural hematoma and improved symptomatically by hospital day 2, she was discharged with unexpected early recovery  The patients vitals on arrival were   ED Triage Vitals   Temperature Pulse Respirations Blood Pressure SpO2   12/26/21 0403 12/26/21 0401 12/26/21 0401 12/26/21 0401 12/26/21 0401   (!) 97 4 °F (36 3 °C) 83 20 166/79 100 %      Temp Source Heart Rate Source Patient Position - Orthostatic VS BP Location FiO2 (%)   12/26/21 0403 12/26/21 0401 12/26/21 0704 12/26/21 0704 --   Oral Monitor Lying Right arm       Pain Score       12/26/21 0419       8           Past Medical History:   Diagnosis Date    Acute blood loss anemia 9/9/2020    Aftercare following joint replacement 9/9/2020    Patient had right reversed total shoulder arthroplasty   Pain was controlled when he left the hospital       Anxiety     Arthritis     Depression     Disease of thyroid gland     HYPO    Femur neck fracture (HCC)     GERD (gastroesophageal reflux disease)     Hyperthyroidism     RESOLVED: [id number]    Osteoporosis     Polio     PVD [facility]peripheral vascular disease) ([facility] [address] [facility]     Right BBB/left ant fasc block     UTI (urinary tract infection)     Varicella infection     LAST ASSESSED: [id number]     Past Surgical History:   Procedure Laterality Date    APPENDECTOMY  HIP ARTHROPLASTY Left 11/27/2017    Procedure: REMOVAL IM NAIL CONVERSION TO TOTAL HIP ARTHROPLASTY POSTERIOR APPROACH;  Surgeon: Rashida Sorenson MD;  [facility];  Service: Orthopedics    HIP FRACTURE SURGERY      HIP SURGERY      both hips replaced;2013 left ,2014 right    JOINT REPLACEMENT Right     HIP TOTAL    MA CONV PREV HIP SURG TO TOT HIP ARTHROPLAS Left 10/4/2017    Procedure: Rafaela Embs removal of left hip;  Surgeon: Rashida Sorenson MD;  [facility];  Service: Orthopedics    MA RECONSTR TOTAL SHOULDER IMPLANT Right 9/2/2020    Procedure: ARTHROPLASTY SHOULDER REVERSE;  Surgeon: Pardeep Dugan MD;  [facility];  Service: Orthopedics    MA RECONSTR TOTAL SHOULDER IMPLANT Left 6/1/2021    Procedure: ARTHROPLASTY SHOULDER REVERSE;  Surgeon: Pardeep Dugan MD;  [facility];  Service: Orthopedics    REVISION TOTAL HIP ARTHROPLASTY Right     TONSILLECTOMY             Consults have been placed to:   IP CONSULT TO NEUROSURGERY    Vitals:    12/27/21 0330 12/27/21 0525 12/27/21 0800 12/27/21 1200   BP:  140/68 134/65 162/71   BP Location:    Right arm   Pulse: 76 80 72 80   Resp:  18 18 18   Temp:       TempSrc:       SpO2: 99% 100% 100% 100%   Weight: 46 7 kg (103 lb)      Height: 4' 10" (1 473 m)          Most recent labs:    No results for input(s): WBC, HGB, HCT, PLT, K, NA, CALCIUM, BUN, CREATININE, LIPASE, AMYLASE, INR, TROPONINI, CKTOTAL, AST, ALT, ALKPHOS, BILITOT in the last 72 hours  Scheduled Meds:  Continuous Infusions:No current facility-administered medications for this encounter  PRN Meds:      Surgical procedures (if appropriate):

## 2022-01-25 ENCOUNTER — OFFICE VISIT (OUTPATIENT)
Dept: INTERNAL MEDICINE CLINIC | Facility: CLINIC | Age: 77
End: 2022-01-25

## 2022-01-25 ENCOUNTER — HOSPITAL ENCOUNTER (OUTPATIENT)
Dept: RADIOLOGY | Facility: HOSPITAL | Age: 77
Discharge: HOME | End: 2022-01-25
Attending: ORTHOPAEDIC SURGERY | Admitting: RADIOLOGY
Payer: MEDICARE

## 2022-01-25 ENCOUNTER — HOSPITAL ENCOUNTER (OUTPATIENT)
Dept: RADIOLOGY | Facility: HOSPITAL | Age: 77
Discharge: HOME | End: 2022-01-25
Attending: ORTHOPAEDIC SURGERY
Payer: MEDICARE

## 2022-01-25 VITALS
HEART RATE: 93 BPM | BODY MASS INDEX: 21.2 KG/M2 | DIASTOLIC BLOOD PRESSURE: 72 MMHG | HEIGHT: 58 IN | WEIGHT: 101 LBS | SYSTOLIC BLOOD PRESSURE: 152 MMHG

## 2022-01-25 DIAGNOSIS — G25.81 RESTLESS LEGS SYNDROME: Primary | ICD-10-CM

## 2022-01-25 DIAGNOSIS — M19.031 ARTHRITIS OF WRIST, RIGHT: ICD-10-CM

## 2022-01-25 DIAGNOSIS — S66.811A EXTENSOR TENDON RUPTURE OF HAND, RIGHT, INITIAL ENCOUNTER: ICD-10-CM

## 2022-01-25 DIAGNOSIS — E87.70 HYPERVOLEMIA, UNSPECIFIED HYPERVOLEMIA TYPE: ICD-10-CM

## 2022-01-25 DIAGNOSIS — M25.531 PAIN IN RIGHT WRIST: ICD-10-CM

## 2022-01-25 DIAGNOSIS — T14.8XXA BLISTER: ICD-10-CM

## 2022-01-25 DIAGNOSIS — G89.4 CHRONIC PAIN SYNDROME: ICD-10-CM

## 2022-01-25 DIAGNOSIS — D50.9 IRON DEFICIENCY ANEMIA, UNSPECIFIED IRON DEFICIENCY ANEMIA TYPE: ICD-10-CM

## 2022-01-25 DIAGNOSIS — Z96.612 S/P REVERSE TOTAL SHOULDER ARTHROPLASTY, LEFT: ICD-10-CM

## 2022-01-25 PROCEDURE — 99214 OFFICE O/P EST MOD 30 MIN: CPT | Performed by: INTERNAL MEDICINE

## 2022-01-25 PROCEDURE — 73200 CT UPPER EXTREMITY W/O DYE: CPT

## 2022-01-25 PROCEDURE — G1004 CDSM NDSC: HCPCS

## 2022-01-25 PROCEDURE — 76882 US LMTD JT/FCL EVL NVASC XTR: CPT

## 2022-01-25 RX ORDER — POTASSIUM CHLORIDE 750 MG/1
10 CAPSULE, EXTENDED RELEASE ORAL DAILY
Qty: 90 CAPSULE | Refills: 0 | Status: SHIPPED | OUTPATIENT
Start: 2022-01-25 | End: 2022-04-12 | Stop reason: SDUPTHER

## 2022-01-25 RX ORDER — QUINIDINE GLUCONATE 324 MG
240 TABLET, EXTENDED RELEASE ORAL DAILY
Qty: 90 TABLET | Refills: 1 | Status: SHIPPED | OUTPATIENT
Start: 2022-01-25 | End: 2022-08-03

## 2022-01-25 RX ORDER — ROPINIROLE 2 MG/1
1 TABLET, FILM COATED ORAL
Qty: 90 TABLET | Refills: 1 | Status: SHIPPED | OUTPATIENT
Start: 2022-01-25 | End: 2022-01-25

## 2022-01-25 RX ORDER — METHOCARBAMOL 500 MG/1
500 TABLET, FILM COATED ORAL DAILY PRN
Qty: 90 TABLET | Refills: 0 | Status: SHIPPED | OUTPATIENT
Start: 2022-01-25 | End: 2022-04-12 | Stop reason: SDUPTHER

## 2022-01-25 RX ORDER — ROPINIROLE 2 MG/1
2 TABLET, FILM COATED ORAL
Qty: 90 TABLET | Refills: 1 | Status: SHIPPED | OUTPATIENT
Start: 2022-01-25

## 2022-01-25 NOTE — PROGRESS NOTES
INTERNAL MEDICINE FOLLOW-UP OFFICE VISIT  [facility]  [address] [facility], [facility] [address]    NAME: Jayla Rivera  AGE: 67 y o  SEX: female    DATE OF ENCOUNTER: 1/25/2022    Assessment and Plan     1  Restless legs syndrome    Endorses persistent restless legs which prevents her from sleep  Will increase Requip from 1 to 2 mg  Will start her on iron supplement  Follow up in 6 weeks    - ferrous gluconate (FERGON) 240 (27 FE) MG tablet; Take 1 tablet (240 mg total) by mouth in the morning  Dispense: 90 tablet; Refill: 1  - rOPINIRole (REQUIP) 2 mg tablet; Take 1 tablet (2 mg total) by mouth daily at bedtime Take 1 tablet by mouth daily at bedtime  Dispense: 90 tablet; Refill: 1    2  Chronic pain syndrome      3  S/P reverse total shoulder arthroplasty, left    - methocarbamol (ROBAXIN) 500 mg tablet; Take 1 tablet (500 mg total) by mouth daily as needed for muscle spasms  Dispense: 90 tablet; Refill: 0    4  Hypervolemia, unspecified hypervolemia type    Pitting edema with weeping ulcers   Chronic stasis  Currently on 20mg lasix but has not been taking it  Recommend adherence     - Basic metabolic panel; Future  - potassium chloride (MICRO-K) 10 MEQ CR capsule; Take 1 capsule (10 mEq total) by mouth daily  Dispense: 90 capsule; Refill: 0    5  Iron deficiency anemia, unspecified iron deficiency anemia type    Ferrous gluconate    6  Blister    Weeping ulcers b/l legs  No discharge   Covered with dressing   Recommend elevating legs  Lasix adherence       Orders Placed This Encounter   Procedures    Basic metabolic panel         Chief Complaint     Chief Complaint   Patient presents with    Fall     patient fell few weeks ago on face    Follow-up     bilateral leg blisters       History of Present Illness     HPI      Pt is a 67 y  o  female with PMH of PVD, right BBB, GERD, hypothyroidism, RA, SLE, depression with anxiety, restless leg syndrome, arthritis and a recent fall with subdural hematoma however no intervention who presents for follow up with her niece  Endorses b/l weeping ulcers on the leg  Pt is on lasix 20mg but does not take it and been told to elevate her legs which is difficult at times due to uncontrolled restless legs  She recently had a fall causing subdural hematoma  She denies any headaches of focal deficits at this time  She does take gabapentin during day and before sleeping  Recommended discontinuation as it can increase the risk for falls  No alcohol or tobacco use  Lives with her sister in the house  Both retired  Does have an aid comes in few times a weeks  Unable to do all ADLS  Two nieces will help as well  The following portions of the patient's history were reviewed and updated as appropriate: allergies, current medications, past family history, past medical history, past social history, past surgical history and problem list     Review of Systems     Review of Systems   Constitutional: Negative for fever and unexpected weight change  HENT: Negative for drooling and voice change  Eyes: Negative for visual disturbance  Respiratory: Negative for cough, choking, chest tightness, shortness of breath and wheezing  Cardiovascular: Negative for chest pain, palpitations and leg swelling  Gastrointestinal: Negative for constipation, nausea and vomiting  Endocrine: Negative for polydipsia and polyuria  Genitourinary: Negative for dysuria and flank pain  Musculoskeletal: Positive for back pain  Allergic/Immunologic: Positive for immunocompromised state  Neurological: Negative for seizures, syncope, weakness, light-headedness and numbness         Active Problem List     Patient Active Problem List   Diagnosis    Anxiety    RLS (restless legs syndrome)    S/P total hip arthroplasty    Hypothyroidism due to Hashimoto's thyroiditis    Age-related osteoporosis without current pathological fracture    RBBB    Ambulatory dysfunction    Closed compression fracture of L3 lumbar vertebra    Rheumatoid arthritis involving multiple sites with positive rheumatoid factor (HCC)    Chronic pain syndrome    Other insomnia    Vitamin D deficiency    Dyspepsia    Other forms of systemic lupus erythematosus ([facility] [address] )    Status post reverse total arthroplasty of right shoulder    Depression    Acquired subluxation of left shoulder    Rupture long head biceps tendon, left, initial encounter    S/P reverse total shoulder arthroplasty, left    Aftercare following left shoulder joint replacement surgery    Shoulder pain, bilateral    Failed orthopedic implant (HCC)    Venous insufficiency    SDH (subdural hematoma) ([facility])       Objective     /72 (BP Location: Left arm, Patient Position: Sitting, Cuff Size: Adult)   Pulse 93   Ht 4' 10" (1 473 m)   Wt 45 8 kg (101 lb)   BMI 21 11 kg/m²     Physical Exam  Constitutional:       General: She is not in acute distress  Appearance: She is normal weight  She is not ill-appearing, toxic-appearing or diaphoretic  HENT:      Head: Normocephalic and atraumatic  Nose: No congestion or rhinorrhea  Cardiovascular:      Rate and Rhythm: Normal rate and regular rhythm  Pulses: Normal pulses  Heart sounds: Normal heart sounds  No murmur heard  No friction rub  No gallop  Pulmonary:      Effort: Pulmonary effort is normal       Breath sounds: Normal breath sounds  No wheezing or rales  Chest:      Chest wall: No tenderness  Abdominal:      General: Abdomen is flat  There is no distension  Palpations: There is no mass  Musculoskeletal:      Right lower leg: Edema present  Left lower leg: Edema present  Comments: Open blisters in both legs  No puss drainage    Neurological:      Mental Status: She is alert     Psychiatric:         Mood and Affect: Mood normal          Behavior: Behavior normal          Current Medications     Current Outpatient Medications:    acetaminophen (TYLENOL) 325 mg tablet, Take 3 tablets (975 mg total) by mouth every 8 (eight) hours, Disp: , Rfl: 0    Ascorbic Acid, Vitamin C, (VITAMIN C) 100 MG tablet, Take 400 mg by mouth daily, Disp: , Rfl:     docusate sodium (COLACE) 100 mg capsule, Take 1 capsule (100 mg total) by mouth 2 (two) times a day, Disp: 10 capsule, Rfl: 0    furosemide (LASIX) 20 mg tablet, Take 1 tablet (20 mg total) by mouth daily, Disp: 90 tablet, Rfl: 0    hydroxychloroquine (PLAQUENIL) 200 mg tablet, 1 pill am 1/2 pill pm, Disp: , Rfl:     levothyroxine 25 mcg tablet, Take 1 tablet (25 mcg total) by mouth daily, Disp: 90 tablet, Rfl: 1    methocarbamol (ROBAXIN) 500 mg tablet, Take 1 tablet (500 mg total) by mouth daily as needed for muscle spasms, Disp: 90 tablet, Rfl: 0    methotrexate 2 5 MG tablet, 3 tablets by mouth once a week on Thursday, Disp:  , Rfl:     Multiple Vitamins-Minerals (CENTRUM SILVER PO), Take 1 tablet by mouth daily, Disp: , Rfl:     nortriptyline (PAMELOR) 50 mg capsule, TAKE 1 CAPSULE BY MOUTH EVERY DAY AT BEDTIME, Disp: 90 capsule, Rfl: 1    predniSONE 5 mg tablet, Take 5 mg by mouth daily, Disp: , Rfl:     Psyllium (METAMUCIL FIBER PO), Take 1 packet by mouth 3 (three) times a day , Disp: , Rfl:     rOPINIRole (REQUIP) 2 mg tablet, Take 1 tablet (2 mg total) by mouth daily at bedtime Take 1 tablet by mouth daily at bedtime, Disp: 90 tablet, Rfl: 1    Vitamin E 400 units TABS, Take 400 Units by mouth daily  , Disp: , Rfl:     ferrous gluconate (FERGON) 240 (27 FE) MG tablet, Take 1 tablet (240 mg total) by mouth in the morning, Disp: 90 tablet, Rfl: 1    potassium chloride (MICRO-K) 10 MEQ CR capsule, Take 1 capsule (10 mEq total) by mouth daily, Disp: 90 capsule, Rfl: 0    Health Maintenance     Health Maintenance   Topic Date Due    Medicare Annual Wellness Visit (AWV)  08/29/2019    COVID-19 Vaccine (3 - Pfizer risk 4-dose series) 06/01/2021    Hepatitis C Screening  07/20/2023 (Originally 1945)    Falls: Plan of Care  04/20/2022    Fall Risk  06/16/2022    BMI: Adult  01/25/2023    DTaP,Tdap,and Td Vaccines (4 - Td or Tdap) 12/26/2031    Osteoporosis Screening  Completed    Pneumococcal Vaccine: 65+ Years  Completed    Influenza Vaccine  Completed    HIB Vaccine  Aged Out    Hepatitis B Vaccine  Aged Out    IPV Vaccine  Aged Out    Hepatitis A Vaccine  Aged Out    Meningococcal ACWY Vaccine  Aged Out    HPV Vaccine  Aged Out     Immunization History   Administered Date(s) Administered    COVID-19 PFIZER VACCINE 0 3 ML IM 04/09/2021, 05/04/2021    DTP 10/27/2010    INFLUENZA 10/11/2015, 09/23/2016, 09/21/2018    Influenza, high dose seasonal 0 7 mL 10/20/2021    Influenza, seasonal, injectable 10/27/2010, 09/23/2017    Pneumococcal Conjugate 13-Valent 05/16/2017, 09/23/2017    Pneumococcal Polysaccharide PPV23 11/03/2004, 09/28/2018    Tdap 03/11/2016, 12/26/2021    Zoster Vaccine Recombinant 10/20/2020, 01/07/2021    influenza, trivalent, adjuvanted 09/21/2018, 10/05/2019       Guillermo Alvarenga MD  PGY 2

## 2022-02-08 ENCOUNTER — TELEPHONE (OUTPATIENT)
Dept: INTERNAL MEDICINE CLINIC | Facility: CLINIC | Age: 77
End: 2022-02-08

## 2022-02-08 NOTE — TELEPHONE ENCOUNTER
Folder Color- Blue    Name of [id number] 2/97/17-[birth year]10[birth year]    Form to be filled out byCarissa Fraire    Form to be Faxed to [phone]    Patient made aware of 10 business day policy

## 2022-02-09 NOTE — TELEPHONE ENCOUNTER
Form completed by Dr Efe Gentile  Form placed in [facility] clerical folder

## 2022-02-11 ENCOUNTER — OFFICE VISIT (OUTPATIENT)
Dept: INTERNAL MEDICINE CLINIC | Facility: CLINIC | Age: 77
End: 2022-02-11

## 2022-02-11 VITALS
SYSTOLIC BLOOD PRESSURE: 153 MMHG | HEART RATE: 71 BPM | TEMPERATURE: 97.8 F | WEIGHT: 94.4 LBS | HEIGHT: 58 IN | BODY MASS INDEX: 19.82 KG/M2 | DIASTOLIC BLOOD PRESSURE: 78 MMHG

## 2022-02-11 DIAGNOSIS — R03.0 ELEVATED BLOOD-PRESSURE READING WITHOUT DIAGNOSIS OF HYPERTENSION: ICD-10-CM

## 2022-02-11 DIAGNOSIS — G25.81 RLS (RESTLESS LEGS SYNDROME): ICD-10-CM

## 2022-02-11 DIAGNOSIS — I87.2 VENOUS INSUFFICIENCY: ICD-10-CM

## 2022-02-11 DIAGNOSIS — R26.2 AMBULATORY DYSFUNCTION: ICD-10-CM

## 2022-02-11 DIAGNOSIS — M79.89 SWELLING OF LOWER EXTREMITY: Primary | ICD-10-CM

## 2022-02-11 DIAGNOSIS — G47.09 OTHER INSOMNIA: ICD-10-CM

## 2022-02-11 DIAGNOSIS — S81.801D OPEN WOUND OF RIGHT LOWER LEG, SUBSEQUENT ENCOUNTER: ICD-10-CM

## 2022-02-11 PROCEDURE — 99214 OFFICE O/P EST MOD 30 MIN: CPT | Performed by: PHYSICIAN ASSISTANT

## 2022-02-11 RX ORDER — NORTRIPTYLINE HYDROCHLORIDE 50 MG/1
100 CAPSULE ORAL
Qty: 90 CAPSULE | Refills: 1 | Status: SHIPPED | OUTPATIENT
Start: 2022-02-11

## 2022-02-11 RX ORDER — FUROSEMIDE 20 MG/1
20 TABLET ORAL DAILY
Qty: 90 TABLET | Refills: 1 | Status: SHIPPED | OUTPATIENT
Start: 2022-02-11 | End: 2022-05-19

## 2022-02-11 NOTE — PROGRESS NOTES
Assessment/Plan:      Diagnoses and all orders for this visit:    Swelling of lower extremity  -     furosemide (LASIX) 20 mg tablet; Take 1 tablet (20 mg total) by mouth daily    Venous insufficiency  -     furosemide (LASIX) 20 mg tablet; Take 1 tablet (20 mg total) by mouth daily    Ambulatory dysfunction    RLS (restless legs syndrome)    Other insomnia  -     nortriptyline (PAMELOR) 50 mg capsule; Take 2 capsules (100 mg total) by mouth daily at bedtime    Open wound of right lower leg, subsequent encounter    Elevated blood-pressure reading without diagnosis of hypertension      72y/o female new to me, presenting today at request of resident to reassess RLE wound, RLS and LE swelling, compliant check  Pt today has continued LE swelling, noncompliance on lasix, though RLE wound appears to be healing nicely without complication  Pt remains frustrated as she continued c/o what seems to be restless leg sxs, though excessive during the day  Difficult to understand as pt states she feels like her legs want to walk out from under her and she cannot sit in one place for too long until she needs to get up and walk  She denies however any LE pain, weakness, jumping, cramping or N/T  Requip increased last appt to 2mg, though pt states not helping at night  I feel that her ongoing LE swelling can be contributing to her daytime sxs  Family member accompanying her today will make sure she has lasix at home  However pt lives with her sister and is not under surveillance  Explained to pt why we have her taking the lasix  Explained to pt the importance of taking lasix daily, low sodium, LE elevation when possible and compression stockings  Pt seeming reluctant but agrees, compliance remains questionable  Discussed use of gabapentin, though appears she had been on it but was stopped due to concern for side effects and falls    Pt already seen for subdural hematoma 2 months ago after a fall and after brief review of her chart from last year, also sustaining falls, resulting in different injuries including shoulder requiring surgery  Pt will continue requip at 2mg before bed  Will increase nortriptyline from 50mg to 100mg QHS to help with sleep  Goal during day is to work on decreasing LE edema with recommendations as above  Stressed importance to pt to use walker or assistive device for steady gait and avoid loose rugs on floors  Question safety of patient in the home, question of waiver has been attempted - defer to discuss next appt  Wound RLE appears to be healing nicely, continue appropriate wound care  Family member to look over pt  BP elevated today and past 2 previous documented as well, though 12/26 from ED visit for fall/head injury  Pt with no dx of HTN  Lasix restart may help  Recheck BP next appt  F/u in 6-8 weeks for recheck of RLS/leg sxs, gait, LE swelling, elevated BP  Chief Complaint   Patient presents with    Follow-up     started on lasix       Subjective:     Patient ID: Javon Kennedy is a 67 y o  female     72y/o female here today for LE swelling f/u, blisters on RLE and RLS  Family member presenting today with her, cannot confirm what she is taking  Pt did not bring pill bottles with her  Family member states pt liver with her sister who has her own health issues and is not a reliable caretaker  States restless leg is not better since increasing ropinirole to 2mg  States "legs feel like they want to go walk around without me"  She cannot explain it any better  No pain In legs, denies physical jumping, denies cramping sensation, denies N/T sensations  States just cannot sit still for too long  She doesn't know of she is taking lasix which was advised to start to take every day from last appt  States doesn't like to take it because makes her urinate more  She does not elevate her legs or wear compression stockings as recommended       Blisters and wounds on RLE better  Review of Systems   Constitutional: Negative  Respiratory: Negative  Cardiovascular: Positive for leg swelling  Gastrointestinal: Negative  Genitourinary:        Increased urination with lasix   Musculoskeletal: Negative  Skin:        As in HPI   Neurological:        Restless leg sxs though pt is not reporting N/t sensation         The following portions of the patient's history were reviewed and updated as appropriate: allergies, current medications, past family history, past medical history, past social history, past surgical history and problem list       Objective:     Physical Exam  Vitals reviewed  Constitutional:       General: She is not in acute distress  Appearance: Normal appearance  She is not ill-appearing or toxic-appearing  Comments: Pt with thin stature, sitting comfortable in wheelchair   HENT:      Head: Normocephalic and atraumatic  Cardiovascular:      Rate and Rhythm: Normal rate and regular rhythm  Heart sounds: Normal heart sounds  Comments: Pt with very thin legs, muscle atrophy, though much pitting 2+ B/L pretibial  No erythema or warmth of associated skin to be concerned for cellulitis  Pulmonary:      Effort: Pulmonary effort is normal       Breath sounds: Normal breath sounds  Musculoskeletal:      Cervical back: Neck supple  Skin:     Comments: Small circular superficial, dry open area anterior right shin with smaller scabbed lesion just inferior  No evidence of infection such as redness, drainage or warmth  No TTP  Neurological:      Mental Status: She is alert and oriented to person, place, and time  Psychiatric:         Mood and Affect: Mood normal          Behavior: Behavior normal          Thought Content:  Thought content normal            Vitals:    02/11/22 0812   BP: 153/78   BP Location: Left arm   Patient Position: Sitting   Cuff Size: Adult   Pulse: 71   Temp: 97 8 °F (36 6 °C)   TempSrc: Temporal   Weight: 42 8 kg (94 lb 6 4 oz)   Height: 4' 10" (1 473 m)

## 2022-02-17 NOTE — PROGRESS NOTES
Problem: Dysphagia  Goal: # Exhibits no s/s of aspiration  Description: Symptoms of aspiration include coughing, choking, throat clearing, change in voice quality, and/or a decrease in oxygen saturation by more than 2% points from baseline after swallowing.  Flowsheets (Taken 2/17/2022 1707)  Swallow:   Chokes with solids   Coughs with liquids  Note: Pt returned from [facility] with anesthesia, numbing liquid used. Will supervise cares, and feed patient. Will monitor for any residual numbness and or bleeding from scope.       Progress Note - Geetha Rivera 1945, 75 y o  female MRN: [id number]    Unit/Bed#: ED 21 Encounter: [id number]    Primary Care Provider: Chris Schumacher MD   Date and time admitted to hospital: 11/12/2018 11:37 PM    Post-admit check      * Ambulatory dysfunction   Assessment & Plan    · Likely multifactorial   · She had a fall two weeks ago at home with increasing pain since then  · Imaging shows L3 compression fracture  · S/p total hip arthroplasty, orthopedics consulted, do not feel her sx are related to her hip arthroplasty and more so likely related to L3 compression fracture and recommend neurosurgery consult  · Continue PRN pain medications  · Will obtain X-ray of right knee as she is complaining of knee pain as well since she fell  · Consult neurosurgery for input  · PT/OT     [facility]   Assessment & Plan    · See above plan   · Pain control PRN     Hypothyroidism   Assessment & Plan    · Continue thyroid medication  · TSH elevated, will obtain free T4 level     S/P total hip arthroplasty   Assessment & Plan    · Patient denies hip pain at this time, seen by ortho and do not suspect her back pain is associated with her total hip arthroplasty   · F/u with ortho outpatient       Patient reports she still continues to have pain in her lower back, as well as right knee pain  The right knee pain she reports is not new and has been persistent since she fell about two weeks ago  Pain medications have not been helping much and she cannot stand on the right leg 2/2 knee pain  Denies bowel/bladder incontinence, saddle anesthesia  No other complaints at this time  Of note, an IUD was noted on imaging - confirmed with patient that she had this placed 30-40 years ago and was told it could stay in place if it was not causing any issues  Exam: A&Ox3, NAD, resting in bed  Heart RRR, lungs CTA B/L  Abdomen +BS, soft, non-tender    Strength intact  Mild right knee tenderness, no edema noted  Lidocaine patch on right knee with no surrounding erythema noted  ROM intact

## 2022-02-23 ENCOUNTER — OFFICE VISIT (OUTPATIENT)
Dept: OBGYN CLINIC | Facility: HOSPITAL | Age: 77
End: 2022-02-23
Payer: MEDICARE

## 2022-02-23 VITALS
WEIGHT: 96.4 LBS | HEART RATE: 74 BPM | DIASTOLIC BLOOD PRESSURE: 75 MMHG | BODY MASS INDEX: 20.24 KG/M2 | SYSTOLIC BLOOD PRESSURE: 132 MMHG | HEIGHT: 58 IN

## 2022-02-23 DIAGNOSIS — M19.031 ARTHRITIS OF WRIST, RIGHT: ICD-10-CM

## 2022-02-23 DIAGNOSIS — S66.811A EXTENSOR TENDON RUPTURE OF HAND, RIGHT, INITIAL ENCOUNTER: ICD-10-CM

## 2022-02-23 DIAGNOSIS — G56.01 CARPAL TUNNEL SYNDROME ON RIGHT: Primary | ICD-10-CM

## 2022-02-23 PROCEDURE — 99214 OFFICE O/P EST MOD 30 MIN: CPT | Performed by: ORTHOPAEDIC SURGERY

## 2022-02-23 RX ORDER — LIDOCAINE HYDROCHLORIDE AND EPINEPHRINE 10; 10 MG/ML; UG/ML
20 INJECTION, SOLUTION INFILTRATION; PERINEURAL ONCE
Status: CANCELLED | OUTPATIENT
Start: 2022-02-23 | End: 2022-02-23

## 2022-02-23 NOTE — PROGRESS NOTES
ASSESSMENT/PLAN:    Assessment:   Right carpal tunnel  Right wrist arthritis  Right ring extensor tendon rupture      Plan:   Endoscopic Carpal Tunnel Release  Right (local)    Patient discuss other surgical options and consider tendon transfer and possible wrist fusion at a later date      Follow Up: After Surgery    To Do Next Visit:  Sutures out      Operative Discussions:  Endoscopic Carpal Tunnel Release: The anatomy and physiology of carpal tunnel syndrome was discussed with the patient today  Increase pressure localized under the transverse carpal ligament can cause pain, numbness, tingling, or dysesthesias within the median nerve distribution as well as feelings of fatigue, clumsiness, or awkwardness  These symptoms typically occur at night and worse in the morning upon waking  Eventually, untreated carpal tunnel syndrome can result in weakness and permanent loss of muscle within the thenar compartment of the hand  Treatment options were discussed with the patient  Conservative treatment includes nocturnal resting splints to keep the nerve in a neutral position, ergonomic changes within the work or home environment, activity modification, and tendon gliding exercises  Steroid injections within the carpal canal can help a majority of patients, however this is often self-limited in a majority of patients  Surgical intervention to divide the transverse carpal ligament typically results in a long-lasting relief of the patient's complaints, with the recurrence rate of less than 1%  The patient has elected to undergo an endoscopic carpal tunnel release  The 2 incision technique was discussed with the patient, which results in approximately a two-week less recovery time, and less wound complications  In the postoperative period, light activities are allowed immediately, driving is allowed when narcotic medication has stopped, and the bandages may be removed and incision may get wet after 2 days    Heavy activities (lifting more than approximately 10 pounds) will be allowed after follow up appointment in 1-2 weeks  While the pain and discomfort in the hands generally improves rapidly, the numbness and tingling as well as the strength will slowly improve over weeks to months depending on the severity of the carpal tunnel syndrome  Pillar pain was discussed with the patient, which is typically a common but self-limiting condition  The risks of bleeding and infection from the surgery are less than 1%  Risk of recurrence is approximately 0 5%  The risks of nerve injury or nerve damage or damage to the blood vessels is approximately 1 in 1200  The patient has an understanding of the above mentioned discussion  The risks and benefits of the procedure were explained to the patient, which include, but are not limited to: Bleeding, infection, recurrence, pain, scar, damage to tendons, damage to nerves, and damage to blood vessels, failure to give desired results and complications related to anesthesia   These risks, along with alternative conservative treatment options, and postoperative protocols were voiced back and understood by the patient   All questions were answered to the patient's satisfaction   The patient agrees to comply with a standard postoperative protocol, and is willing to proceed   Education was provided via written and auditory forms   There were no barriers to learning   Written handouts regarding wound care, incision and scar care, and general preoperative information was provided to the patient   Prior to surgery, the patient may be requested to stop all anti-inflammatory medications   Prophylactic aspirin, Plavix, and Coumadin may be allowed to be continued   Medications including vitamin E , ginkgo, and fish oil are requested to be stopped approximately one week prior to surgery   Hypertensive medications and beta blockers, if taken, should be continued  _____________________________________________________  CHIEF COMPLAINT:  Chief Complaint   Patient presents with    Right Wrist - Follow-up         SUBJECTIVE:  Babs Mccullough is a 67 y o  female who presents for follow up to discuss the finding of her US and CT scan  Patient reports she is unable to actively extend the right ring finger  She denies any injury, clicking, or catching  She reports numbness and tingling right index, long and ring  PAST MEDICAL HISTORY:  Past Medical History:   Diagnosis Date    Acute blood loss anemia 9/9/2020    Aftercare following joint replacement 9/9/2020    Patient had right reversed total shoulder arthroplasty   Pain was controlled when he left the hospital       Anxiety     Arthritis     Depression     Disease of thyroid gland     HYPO    Femur neck fracture (HCC)     GERD (gastroesophageal reflux disease)     Hyperthyroidism     RESOLVED: [id number]    Osteoporosis     Polio     PVD (peripheral vascular disease) ([address] )     Right BBB/left ant fasc block     UTI (urinary tract infection)     Varicella infection     LAST ASSESSED: [id number]       PAST SURGICAL HISTORY:  Past Surgical History:   Procedure Laterality Date    APPENDECTOMY      HIP ARTHROPLASTY Left 11/27/2017    Procedure: REMOVAL IM NAIL CONVERSION TO TOTAL HIP ARTHROPLASTY POSTERIOR APPROACH;  Surgeon: Gisele Amaral MD;  [facility];  Service: Orthopedics    HIP FRACTURE SURGERY      HIP SURGERY      both hips replaced;2013 left ,2014 right    JOINT REPLACEMENT Right     HIP TOTAL    MN CONV PREV HIP SURG TO TOT HIP Sherl Matter Left 10/4/2017    Procedure: Hareware removal of left hip;  Surgeon: Gisele Amaral MD;  [facility];  Service: Orthopedics    MN RECONSTR TOTAL SHOULDER IMPLANT Right 9/2/2020    Procedure: ARTHROPLASTY SHOULDER REVERSE;  Surgeon: Mary Potts MD;  [facility];  Service: Orthopedics    MN [address] [place] [address] IMPLANT Left 6/1/2021    Procedure: ARTHROPLASTY SHOULDER REVERSE;  Surgeon: Brenda Fritz MD;  [facility];  Service: Orthopedics    REVISION TOTAL HIP ARTHROPLASTY Right     TONSILLECTOMY         FAMILY HISTORY:  Family History   Problem Relation Age of Onset    Rheum arthritis Mother     Rheum arthritis Sister     Prostate cancer Brother        SOCIAL HISTORY:  Social History     Tobacco Use    Smoking status: Former Smoker    Smokeless tobacco: Never Used    Tobacco comment: quit 20-30 years ago   Vaping Use    Vaping Use: Never used   Substance Use Topics    Alcohol use: Not Currently    Drug use: Not Currently       MEDICATIONS:    Current Outpatient Medications:     acetaminophen (TYLENOL) 325 mg tablet, Take 3 tablets (975 mg total) by mouth every 8 (eight) hours, Disp: , Rfl: 0    Ascorbic Acid, Vitamin C, (VITAMIN C) 100 MG tablet, Take 400 mg by mouth daily, Disp: , Rfl:     ferrous gluconate (FERGON) 240 (27 FE) MG tablet, Take 1 tablet (240 mg total) by mouth in the morning, Disp: 90 tablet, Rfl: 1    furosemide (LASIX) 20 mg tablet, Take 1 tablet (20 mg total) by mouth daily, Disp: 90 tablet, Rfl: 1    hydroxychloroquine (PLAQUENIL) 200 mg tablet, 1 pill am 1/2 pill pm, Disp: , Rfl:     levothyroxine 25 mcg tablet, Take 1 tablet (25 mcg total) by mouth daily, Disp: 90 tablet, Rfl: 1    methocarbamol (ROBAXIN) 500 mg tablet, Take 1 tablet (500 mg total) by mouth daily as needed for muscle spasms, Disp: 90 tablet, Rfl: 0    methotrexate 2 5 MG tablet, 3 tablets by mouth once a week on Thursday, Disp:  , Rfl:     Multiple Vitamins-Minerals (CENTRUM SILVER PO), Take 1 tablet by mouth daily, Disp: , Rfl:     nortriptyline (PAMELOR) 50 mg capsule, Take 2 capsules (100 mg total) by mouth daily at bedtime, Disp: 90 capsule, Rfl: 1    potassium chloride (MICRO-K) 10 MEQ CR capsule, Take 1 capsule (10 mEq total) by mouth daily, Disp: 90 capsule, Rfl: 0    predniSONE 5 mg tablet, Take 5 mg by mouth daily, Disp: , Rfl:     Psyllium (METAMUCIL FIBER PO), Take 1 packet by mouth 3 (three) times a day , Disp: , Rfl:     rOPINIRole (REQUIP) 2 mg tablet, Take 1 tablet (2 mg total) by mouth daily at bedtime Take 1 tablet by mouth daily at bedtime, Disp: 90 tablet, Rfl: 1    Vitamin E 400 units TABS, Take 400 Units by mouth daily  , Disp: , Rfl:     docusate sodium (COLACE) 100 mg capsule, Take 1 capsule (100 mg total) by mouth 2 (two) times a day (Patient not taking: Reported on 2/11/2022 ), Disp: 10 capsule, Rfl: 0    ALLERGIES:  No Known Allergies    REVIEW OF SYSTEMS:  Pertinent items are noted in HPI  A comprehensive review of systems was negative  LABS:  HgA1c:   Lab Results   Component Value Date    HGBA1C 5 3 05/11/2021     BMP:   Lab Results   Component Value Date    GLUCOSE 89 10/30/2014    CALCIUM 8 3 12/27/2021     (L) 10/30/2014    K 4 4 12/27/2021    CO2 27 12/27/2021     12/27/2021    BUN 13 12/27/2021    CREATININE 0 49 (L) 12/27/2021           _____________________________________________________  PHYSICAL EXAMINATION:  Vital signs: /75   Pulse 74   Ht 4' 10" (1 473 m)   Wt 43 7 kg (96 lb 6 4 oz)   BMI 20 15 kg/m²   General: well developed and well nourished, alert, oriented times 3 and appears comfortable  Psychiatric: Normal  HEENT: Trachea Midline, No torticollis  Cardiovascular: No discernable arrhythmia  Pulmonary: No wheezing or stridor  Abdomen: No rebound or guarding  Extremities: No peripheral edema  Skin: No masses, erythema, lacerations, fluctation, ulcerations  Neurovascular: Pulses Intact    MUSCULOSKELETAL EXAMINATION:  RIGHT SIDE:  Carpal tunnel:  Postive Tinel's, Positive Derkin's Compression Test and weakness APB 4-/5, mild thenar atrophy   Mild TTP over thumb CMC joint  Non TTP A1 pulley, no nodule, palpable mass dorsal aspect with palpable osteophyte  Extensor lag ring finger  _____________________________________________________  STUDIES REVIEWED:  US right wrist:carpal tunnel ruled in  Extensor tendon rupture ring finger 6 5 cm retraction  CT right wrist:  Demonstrates collapse and fragmentation of the lunate, significant arthritic change with cystic change localized to the scaphoid, triquetrum, and capitate        PROCEDURES PERFORMED:  Procedures  No Procedures performed today   Scribe Attestation    I,:  Tai Dunn am acting as a scribe while in the presence of the attending physician :       I,:  Leonid Dugan MD personally performed the services described in this documentation    as scribed in my presence :

## 2022-03-08 ENCOUNTER — APPOINTMENT (OUTPATIENT)
Dept: LAB | Facility: CLINIC | Age: 77
End: 2022-03-08
Payer: MEDICARE

## 2022-03-08 DIAGNOSIS — M32.9 SYSTEMIC LUPUS ERYTHEMATOSUS, UNSPECIFIED SLE TYPE, UNSPECIFIED ORGAN INVOLVEMENT STATUS (HCC): ICD-10-CM

## 2022-03-08 DIAGNOSIS — E87.70 HYPERVOLEMIA, UNSPECIFIED HYPERVOLEMIA TYPE: ICD-10-CM

## 2022-03-08 LAB
ALBUMIN SERPL BCP-MCNC: 3.3 G/DL (ref 3.5–5)
ALP SERPL-CCNC: 82 U/L (ref 46–116)
ALT SERPL W P-5'-P-CCNC: 19 U/L (ref 12–78)
ANION GAP SERPL CALCULATED.3IONS-SCNC: 4 MMOL/L (ref 4–13)
AST SERPL W P-5'-P-CCNC: 14 U/L (ref 5–45)
BASOPHILS # BLD AUTO: 0.02 THOUSANDS/ΜL (ref 0–0.1)
BASOPHILS NFR BLD AUTO: 0 % (ref 0–1)
BILIRUB DIRECT SERPL-MCNC: 0.14 MG/DL (ref 0–0.2)
BILIRUB SERPL-MCNC: 0.27 MG/DL (ref 0.2–1)
BUN SERPL-MCNC: 15 MG/DL (ref 5–25)
CALCIUM SERPL-MCNC: 8.7 MG/DL (ref 8.3–10.1)
CHLORIDE SERPL-SCNC: 107 MMOL/L (ref 100–108)
CO2 SERPL-SCNC: 28 MMOL/L (ref 21–32)
CREAT SERPL-MCNC: 0.61 MG/DL (ref 0.6–1.3)
CRP SERPL QL: <3 MG/L
EOSINOPHIL # BLD AUTO: 0.01 THOUSAND/ΜL (ref 0–0.61)
EOSINOPHIL NFR BLD AUTO: 0 % (ref 0–6)
ERYTHROCYTE [DISTWIDTH] IN BLOOD BY AUTOMATED COUNT: 16.1 % (ref 11.6–15.1)
ERYTHROCYTE [SEDIMENTATION RATE] IN BLOOD: 8 MM/HOUR (ref 0–29)
GFR SERPL CREATININE-BSD FRML MDRD: 87 ML/MIN/1.73SQ M
GLUCOSE P FAST SERPL-MCNC: 102 MG/DL (ref 65–99)
HCT VFR BLD AUTO: 34.7 % (ref 34.8–46.1)
HGB BLD-MCNC: 11.1 G/DL (ref 11.5–15.4)
IMM GRANULOCYTES # BLD AUTO: 0.05 THOUSAND/UL (ref 0–0.2)
IMM GRANULOCYTES NFR BLD AUTO: 1 % (ref 0–2)
LYMPHOCYTES # BLD AUTO: 0.87 THOUSANDS/ΜL (ref 0.6–4.47)
LYMPHOCYTES NFR BLD AUTO: 11 % (ref 14–44)
MCH RBC QN AUTO: 27.6 PG (ref 26.8–34.3)
MCHC RBC AUTO-ENTMCNC: 32 G/DL (ref 31.4–37.4)
MCV RBC AUTO: 86 FL (ref 82–98)
MONOCYTES # BLD AUTO: 0.6 THOUSAND/ΜL (ref 0.17–1.22)
MONOCYTES NFR BLD AUTO: 7 % (ref 4–12)
NEUTROPHILS # BLD AUTO: 6.57 THOUSANDS/ΜL (ref 1.85–7.62)
NEUTS SEG NFR BLD AUTO: 81 % (ref 43–75)
NRBC BLD AUTO-RTO: 0 /100 WBCS
PLATELET # BLD AUTO: 274 THOUSANDS/UL (ref 149–390)
PMV BLD AUTO: 8.5 FL (ref 8.9–12.7)
POTASSIUM SERPL-SCNC: 4.2 MMOL/L (ref 3.5–5.3)
PROT SERPL-MCNC: 6.8 G/DL (ref 6.4–8.2)
RBC # BLD AUTO: 4.02 MILLION/UL (ref 3.81–5.12)
SODIUM SERPL-SCNC: 139 MMOL/L (ref 136–145)
WBC # BLD AUTO: 8.12 THOUSAND/UL (ref 4.31–10.16)

## 2022-03-08 PROCEDURE — 85025 COMPLETE CBC W/AUTO DIFF WBC: CPT

## 2022-03-08 PROCEDURE — 86140 C-REACTIVE PROTEIN: CPT

## 2022-03-08 PROCEDURE — 36415 COLL VENOUS BLD VENIPUNCTURE: CPT

## 2022-03-08 PROCEDURE — 80048 BASIC METABOLIC PNL TOTAL CA: CPT

## 2022-03-08 PROCEDURE — 80076 HEPATIC FUNCTION PANEL: CPT

## 2022-03-08 PROCEDURE — 85652 RBC SED RATE AUTOMATED: CPT

## 2022-04-12 DIAGNOSIS — Z96.612 S/P REVERSE TOTAL SHOULDER ARTHROPLASTY, LEFT: ICD-10-CM

## 2022-04-12 DIAGNOSIS — E87.70 HYPERVOLEMIA, UNSPECIFIED HYPERVOLEMIA TYPE: ICD-10-CM

## 2022-04-12 RX ORDER — METHOCARBAMOL 500 MG/1
500 TABLET, FILM COATED ORAL DAILY PRN
Qty: 90 TABLET | Refills: 3 | Status: SHIPPED | OUTPATIENT
Start: 2022-04-12 | End: 2022-05-02

## 2022-04-12 RX ORDER — POTASSIUM CHLORIDE 750 MG/1
10 CAPSULE, EXTENDED RELEASE ORAL DAILY
Qty: 90 CAPSULE | Refills: 3 | Status: SHIPPED | OUTPATIENT
Start: 2022-04-12 | End: 2022-05-19

## 2022-04-16 ENCOUNTER — IMMUNIZATIONS (OUTPATIENT)
Dept: FAMILY MEDICINE CLINIC | Facility: HOSPITAL | Age: 77
End: 2022-04-16

## 2022-04-16 PROCEDURE — 91305 COVID-19 PFIZER VACC TRIS-SUCROSE GRAY CAP 0.3 ML: CPT

## 2022-04-16 PROCEDURE — 0054A COVID-19 PFIZER VACC TRIS-SUCROSE GRAY CAP 0.3 ML: CPT

## 2022-05-02 ENCOUNTER — TRANSCRIBE ORDERS (OUTPATIENT)
Dept: LAB | Facility: HOSPITAL | Age: 77
End: 2022-05-02

## 2022-05-02 ENCOUNTER — OFFICE VISIT (OUTPATIENT)
Dept: FAMILY MEDICINE CLINIC | Facility: CLINIC | Age: 77
End: 2022-05-02
Payer: MEDICARE

## 2022-05-02 ENCOUNTER — LAB (OUTPATIENT)
Dept: LAB | Facility: HOSPITAL | Age: 77
End: 2022-05-02
Attending: INTERNAL MEDICINE
Payer: MEDICARE

## 2022-05-02 VITALS
HEART RATE: 96 BPM | WEIGHT: 95 LBS | TEMPERATURE: 95.9 F | BODY MASS INDEX: 19.94 KG/M2 | RESPIRATION RATE: 16 BRPM | DIASTOLIC BLOOD PRESSURE: 60 MMHG | SYSTOLIC BLOOD PRESSURE: 122 MMHG | OXYGEN SATURATION: 99 % | HEIGHT: 58 IN

## 2022-05-02 DIAGNOSIS — M81.0 SENILE OSTEOPOROSIS: Primary | ICD-10-CM

## 2022-05-02 DIAGNOSIS — M81.0 AGE-RELATED OSTEOPOROSIS WITHOUT CURRENT PATHOLOGICAL FRACTURE: ICD-10-CM

## 2022-05-02 DIAGNOSIS — M81.0 SENILE OSTEOPOROSIS: ICD-10-CM

## 2022-05-02 DIAGNOSIS — E06.3 HYPOTHYROIDISM DUE TO HASHIMOTO'S THYROIDITIS: ICD-10-CM

## 2022-05-02 DIAGNOSIS — Z00.00 ENCOUNTER FOR ANNUAL WELLNESS EXAM IN MEDICARE PATIENT: Primary | ICD-10-CM

## 2022-05-02 DIAGNOSIS — E03.8 HYPOTHYROIDISM DUE TO HASHIMOTO'S THYROIDITIS: ICD-10-CM

## 2022-05-02 DIAGNOSIS — R73.01 IMPAIRED FASTING BLOOD SUGAR: ICD-10-CM

## 2022-05-02 DIAGNOSIS — D64.9 ANEMIA, UNSPECIFIED TYPE: ICD-10-CM

## 2022-05-02 DIAGNOSIS — M05.79 RHEUMATOID ARTHRITIS INVOLVING MULTIPLE SITES WITH POSITIVE RHEUMATOID FACTOR (HCC): ICD-10-CM

## 2022-05-02 DIAGNOSIS — E55.9 VITAMIN D DEFICIENCY: ICD-10-CM

## 2022-05-02 DIAGNOSIS — G25.81 RLS (RESTLESS LEGS SYNDROME): ICD-10-CM

## 2022-05-02 DIAGNOSIS — Z13.220 SCREENING FOR HYPERLIPIDEMIA: ICD-10-CM

## 2022-05-02 PROBLEM — S06.5X9A SDH (SUBDURAL HEMATOMA) (HCC): Status: RESOLVED | Noted: 2021-12-26 | Resolved: 2022-05-02

## 2022-05-02 PROBLEM — Z47.1 AFTERCARE FOLLOWING LEFT SHOULDER JOINT REPLACEMENT SURGERY: Status: RESOLVED | Noted: 2021-06-14 | Resolved: 2022-05-02

## 2022-05-02 PROBLEM — G47.09 OTHER INSOMNIA: Status: RESOLVED | Noted: 2019-05-29 | Resolved: 2022-05-02

## 2022-05-02 PROBLEM — Z96.612 AFTERCARE FOLLOWING LEFT SHOULDER JOINT REPLACEMENT SURGERY: Status: RESOLVED | Noted: 2021-06-14 | Resolved: 2022-05-02

## 2022-05-02 PROBLEM — S06.5XAA SDH (SUBDURAL HEMATOMA): Status: RESOLVED | Noted: 2021-12-26 | Resolved: 2022-05-02

## 2022-05-02 LAB
CALCIUM SERPL-MCNC: 9.4 MG/DL (ref 8.3–10.1)
FERRITIN SERPL-MCNC: 62 NG/ML (ref 8–388)
IRON SATN MFR SERPL: 17 % (ref 15–50)
IRON SERPL-MCNC: 44 UG/DL (ref 50–170)
TIBC SERPL-MCNC: 254 UG/DL (ref 250–450)
TSH SERPL DL<=0.05 MIU/L-ACNC: 3.14 UIU/ML (ref 0.45–4.5)

## 2022-05-02 PROCEDURE — 83550 IRON BINDING TEST: CPT

## 2022-05-02 PROCEDURE — 36415 COLL VENOUS BLD VENIPUNCTURE: CPT

## 2022-05-02 PROCEDURE — G0439 PPPS, SUBSEQ VISIT: HCPCS | Performed by: FAMILY MEDICINE

## 2022-05-02 PROCEDURE — 82310 ASSAY OF CALCIUM: CPT

## 2022-05-02 PROCEDURE — 82728 ASSAY OF FERRITIN: CPT

## 2022-05-02 PROCEDURE — 83540 ASSAY OF IRON: CPT

## 2022-05-02 PROCEDURE — 99204 OFFICE O/P NEW MOD 45 MIN: CPT | Performed by: FAMILY MEDICINE

## 2022-05-02 PROCEDURE — 84443 ASSAY THYROID STIM HORMONE: CPT

## 2022-05-02 RX ORDER — METHOCARBAMOL 750 MG/1
TABLET, FILM COATED ORAL
COMMUNITY
Start: 2022-04-12 | End: 2022-05-02

## 2022-05-02 RX ORDER — FOLIC ACID 1 MG/1
2000 TABLET ORAL DAILY
COMMUNITY
Start: 2022-02-24

## 2022-05-02 RX ORDER — METHOCARBAMOL 750 MG/1
750 TABLET, FILM COATED ORAL 2 TIMES DAILY PRN
Qty: 30 TABLET | Refills: 5 | Status: SHIPPED | OUTPATIENT
Start: 2022-05-02 | End: 2022-06-29

## 2022-05-02 NOTE — ASSESSMENT & PLAN NOTE
Colonoscopy never done and pt refuses  Mammogram overdue and pt refuses  Aged out of paps  Has osteoporosis and does not require screening

## 2022-05-02 NOTE — PROGRESS NOTES
Assessment/Plan:    Encounter for annual wellness exam in Medicare patient  Colonoscopy never done and pt refuses  Mammogram overdue and pt refuses  Aged out of paps  Has osteoporosis and does not require screening  Age-related osteoporosis without current pathological fracture  Continue prolia with rheumatology  Pt has an upcoming appt for a prolia shot but going forward she will get the injections through our office  Hypothyroidism due to Hashimoto's thyroiditis  Recheck levothyroxine next visit  Rheumatoid arthritis involving multiple sites with positive rheumatoid factor (HCC)  Continue methotrexate, prednisone and plaquenil through rheum  Other forms of systemic lupus erythematosus (HCC)  Continue methotrexate, prednisone and plaquenil through rheum  Diagnoses and all orders for this visit:    Encounter for annual wellness exam in Medicare patient    Rheumatoid arthritis involving multiple sites with positive rheumatoid factor ([facility] [address] )    Age-related osteoporosis without current pathological fracture  -     Vitamin D 25 hydroxy; Future    Hypothyroidism due to Hashimoto's thyroiditis  -     TSH, 3rd generation with Free T4 reflex; Future  -     TSH, 3rd generation with Free T4 reflex; Future    Anemia, unspecified type  -     Iron Panel (Includes Ferritin, Iron Sat%, Iron, and TIBC); Future  -     CBC and differential; Future    Screening for hyperlipidemia  -     Lipid panel; Future    Impaired fasting blood sugar  -     Hemoglobin A1C; Future  -     Basic metabolic panel; Future    Vitamin D deficiency  -     Vitamin D 25 hydroxy; Future    RLS (restless legs syndrome)  -     methocarbamol (ROBAXIN) 750 mg tablet; Take 1 tablet (750 mg total) by mouth 2 (two) times a day as needed for muscle spasms    Other orders  -     folic acid (FOLVITE) 1 mg tablet;  Take 2,000 mcg by mouth daily  -     Discontinue: methocarbamol (ROBAXIN) 750 mg tablet        Subjective: establish care Patient ID: Sujatha Whalen is a 67 y o  female with a ho SLE, rheumatoid arthritis, hypothyroidism, osteoporosis, chronic pain     HPI     Here to establish care  She is transferring from [facility]  SLE and rheumatoid arthritis are managed by rheum, Dr Susy Concepcion  She is on methotrexate weekly and prednisone 5mg daily and plaquenil bid  Osteoporosis is also managed by rheum and she is on prolia  Hypothyroidism: On levothyroxine 25mcg  Last TSH was 3 460 10/20/21  Anemia: She has had anemia since 2017  Last time iron was checked was 2020 and iron was low at 33  She is on iron supplements  She has never seen hematology  A cbc in March showed a hg of 11 1 and a normal MCV  Fasting sugars mildly elevated at 102  The following portions of the patient's history were reviewed and updated as appropriate: allergies, current medications, past family history, past medical history, past social history, past surgical history and problem list     Review of Systems   Constitutional: Negative for fever and unexpected weight change  HENT: Negative for ear pain, sore throat and trouble swallowing  Eyes: Negative for pain and visual disturbance  Respiratory: Negative for cough, chest tightness, shortness of breath and wheezing  Cardiovascular: Negative for chest pain  Gastrointestinal: Negative for abdominal distention, abdominal pain, blood in stool, constipation, diarrhea, nausea and vomiting  Endocrine: Negative for polydipsia and polyuria  Genitourinary: Negative for dysuria and hematuria  Musculoskeletal: Negative for back pain and myalgias  Skin: Negative for rash  Neurological: Negative for syncope and headaches  Psychiatric/Behavioral: Negative for suicidal ideas           PHQ-2/9 Depression Screening    Little interest or pleasure in doing things: 0 - not at all  Feeling down, depressed, or hopeless: 0 - not at all           Objective:      /60 (BP Location: Left arm, Patient Position: Sitting, Cuff Size: Adult)   Pulse 96   Temp (!) 95 9 °F (35 5 °C) (Tympanic)   Resp 16   Ht 4' 10" (1 473 m)   Wt 43 1 kg (95 lb)   SpO2 99%   BMI 19 86 kg/m²          Physical Exam  Constitutional:       Appearance: She is well-developed  Comments: Elderly appearing   HENT:      Head: Normocephalic and atraumatic  Right Ear: External ear normal       Left Ear: External ear normal       Mouth/Throat:      Pharynx: No oropharyngeal exudate  Eyes:      General: No scleral icterus  Conjunctiva/sclera: Conjunctivae normal       Pupils: Pupils are equal, round, and reactive to light  Cardiovascular:      Rate and Rhythm: Normal rate and regular rhythm  Heart sounds: No murmur heard  No friction rub  No gallop  Pulmonary:      Effort: Pulmonary effort is normal  No respiratory distress  Breath sounds: Normal breath sounds  No wheezing or rales  Abdominal:      General: Bowel sounds are normal  There is no distension  Palpations: Abdomen is soft  There is no mass  Tenderness: There is no abdominal tenderness  There is no rebound  Musculoskeletal:         General: Normal range of motion  Cervical back: Normal range of motion and neck supple  Right lower leg: Edema (1+) present  Left lower leg: Edema (1+) present  Skin:     General: Skin is warm and dry  Neurological:      Mental Status: She is alert and oriented to person, place, and time  Mental status is at baseline        Comments: Alert and oriented to person, place and time          Recent Results (from the past 1680 hour(s))   Basic metabolic panel    Collection Time: 03/08/22 11:21 AM   Result Value Ref Range    Sodium 139 136 - 145 mmol/L    Potassium 4 2 3 5 - 5 3 mmol/L    Chloride 107 100 - 108 mmol/L    CO2 28 21 - 32 mmol/L    ANION GAP 4 4 - 13 mmol/L    BUN 15 5 - 25 mg/dL    Creatinine 0 61 0 60 - 1 30 mg/dL    Glucose, Fasting 102 (H) 65 - 99 mg/dL    Calcium 8 7 8 3 - 10 1 mg/dL    eGFR 87 ml/min/1 73sq m   CBC and differential    Collection Time: 03/08/22 11:21 AM   Result Value Ref Range    WBC 8 12 4 31 - 10 16 Thousand/uL    RBC 4 02 3 81 - 5 12 Million/uL    Hemoglobin 11 1 (L) 11 5 - 15 4 g/dL    Hematocrit 34 7 (L) 34 8 - 46 1 %    MCV 86 82 - 98 fL    MCH 27 6 26 8 - 34 3 pg    MCHC 32 0 31 4 - 37 4 g/dL    RDW 16 1 (H) 11 6 - 15 1 %    MPV 8 5 (L) 8 9 - 12 7 fL    Platelets 654 665 - 523 Thousands/uL    nRBC 0 /100 WBCs    Neutrophils Relative 81 (H) 43 - 75 %    Immat GRANS % 1 0 - 2 %    Lymphocytes Relative 11 (L) 14 - 44 %    Monocytes Relative 7 4 - 12 %    Eosinophils Relative 0 0 - 6 %    Basophils Relative 0 0 - 1 %    Neutrophils Absolute 6 57 1 85 - 7 62 Thousands/µL    Immature Grans Absolute 0 05 0 00 - 0 20 Thousand/uL    Lymphocytes Absolute 0 87 0 60 - 4 47 Thousands/µL    Monocytes Absolute 0 60 0 17 - 1 22 Thousand/µL    Eosinophils Absolute 0 01 0 00 - 0 61 Thousand/µL    Basophils Absolute 0 02 0 00 - 0 10 Thousands/µL   Sedimentation rate, automated    Collection Time: 03/08/22 11:21 AM   Result Value Ref Range    Sed Rate 8 0 - 29 mm/hour   C-reactive protein    Collection Time: 03/08/22 11:21 AM   Result Value Ref Range    CRP <3 0 <3 0 mg/L   Hepatic function panel    Collection Time: 03/08/22 11:21 AM   Result Value Ref Range    Total Bilirubin 0 27 0 20 - 1 00 mg/dL    Bilirubin, Direct 0 14 0 00 - 0 20 mg/dL    Alkaline Phosphatase 82 46 - 116 U/L    AST 14 5 - 45 U/L    ALT 19 12 - 78 U/L    Total Protein 6 8 6 4 - 8 2 g/dL    Albumin 3 3 (L) 3 5 - 5 0 g/dL

## 2022-05-02 NOTE — ASSESSMENT & PLAN NOTE
Continue prolia with rheumatology  Pt has an upcoming appt for a prolia shot but going forward she will get the injections through our office

## 2022-05-02 NOTE — PATIENT INSTRUCTIONS
Medicare Preventive Visit Patient Instructions  Thank you for completing your Welcome to Medicare Visit or Medicare Annual Wellness Visit today  Your next wellness visit will be due in one year (5/3/2023)  The screening/preventive services that you may require over the next 5-10 years are detailed below  Some tests may not apply to you based off risk factors and/or age  Screening tests ordered at today's visit but not completed yet may show as past due  Also, please note that scanned in results may not display below  Preventive Screenings:  Service Recommendations Previous Testing/Comments   Colorectal Cancer Screening  * Colonoscopy    * Fecal Occult Blood Test (FOBT)/Fecal Immunochemical Test (FIT)  * Fecal DNA/Cologuard Test  * Flexible Sigmoidoscopy Age: 54-65 years old   Colonoscopy: every 10 years (may be performed more frequently if at higher risk)  OR  FOBT/FIT: every 1 year  OR  Cologuard: every 3 years  OR  Sigmoidoscopy: every 5 years  Screening may be recommended earlier than age 48 if at higher risk for colorectal cancer  Also, an individualized decision between you and your healthcare provider will decide whether screening between the ages of 74-80 would be appropriate  Colonoscopy: Not on file  FOBT/FIT: Not on file  Cologuard: Not on file  Sigmoidoscopy: Not on file          Breast Cancer Screening Age: 36 years old  Frequency: every 1-2 years  Not required if history of left and right mastectomy Mammogram: Not on file        Cervical Cancer Screening Between the ages of 21-29, pap smear recommended once every 3 years  Between the ages of 33-67, can perform pap smear with HPV co-testing every 5 years     Recommendations may differ for women with a history of total hysterectomy, cervical cancer, or abnormal pap smears in past  Pap Smear: Not on file    Screening Not Indicated   Hepatitis C Screening Once for adults born between [facility]  More frequently in patients at high risk for Hepatitis C Hep C Antibody: Not on file    Screening Current   Diabetes Screening 1-2 times per year if you're at risk for diabetes or have pre-diabetes Fasting glucose: 102 mg/dL   A1C: 5 3 %    Screening Current   Cholesterol Screening Once every 5 years if you don't have a lipid disorder  May order more often based on risk factors  Lipid panel: 10/27/2020    Screening Current     Other Preventive Screenings Covered by Medicare:  1  Abdominal Aortic Aneurysm (AAA) Screening: covered once if your at risk  You're considered to be at risk if you have a family history of AAA  2  Lung Cancer Screening: covers low dose CT scan once per year if you meet all of the following conditions: (1) Age 50-69; (2) No signs or symptoms of lung cancer; (3) Current smoker or have quit smoking within the last 15 years; (4) You have a tobacco smoking history of at least 30 pack years (packs per day multiplied by number of years you smoked); (5) You get a written order from a healthcare provider  3  Glaucoma Screening: covered annually if you're considered high risk: (1) You have diabetes OR (2) Family history of glaucoma OR (3)  aged 48 and older OR (3)  American aged 72 and older  3  Osteoporosis Screening: covered every 2 years if you meet one of the following conditions: (1) You're estrogen deficient and at risk for osteoporosis based off medical history and other findings; (2) Have a vertebral abnormality; (3) On glucocorticoid therapy for more than 3 months; (4) Have primary hyperparathyroidism; (5) On osteoporosis medications and need to assess response to drug therapy  · Last bone density test (DXA Scan): 04/17/2017  5  HIV Screening: covered annually if you're between the age of 12-76  Also covered annually if you are younger than 13 and older than 72 with risk factors for HIV infection  For pregnant patients, it is covered up to 3 times per pregnancy      Immunizations:  Immunization Recommendations Influenza Vaccine Annual influenza vaccination during flu season is recommended for all persons aged >= 6 months who do not have contraindications   Pneumococcal Vaccine (Prevnar and Pneumovax)  * Prevnar = PCV13  * Pneumovax = PPSV23   Adults 25-60 years old: 1-3 doses may be recommended based on certain risk factors  Adults 72 years old: Prevnar (PCV13) vaccine recommended followed by Pneumovax (PPSV23) vaccine  If already received PPSV23 since turning 65, then PCV13 recommended at least one year after PPSV23 dose  Hepatitis B Vaccine 3 dose series if at intermediate or high risk (ex: diabetes, end stage renal disease, liver disease)   Tetanus (Td) Vaccine - COST NOT COVERED BY MEDICARE PART B Following completion of primary series, a booster dose should be given every 10 years to maintain immunity against tetanus  Td may also be given as tetanus wound prophylaxis  Tdap Vaccine - COST NOT COVERED BY MEDICARE PART B Recommended at least once for all adults  For pregnant patients, recommended with each pregnancy  Shingles Vaccine (Shingrix) - COST NOT COVERED BY MEDICARE PART B  2 shot series recommended in those aged 48 and above     Health Maintenance Due:      Topic Date Due    Hepatitis C Screening  07/20/2022 (Originally 1945)     Immunizations Due:  There are no preventive care reminders to display for this patient  Advance Directives   What are advance directives? Advance directives are legal documents that state your wishes and plans for medical care  These plans are made ahead of time in case you lose your ability to make decisions for yourself  Advance directives can apply to any medical decision, such as the treatments you want, and if you want to donate organs  What are the types of advance directives? There are many types of advance directives, and each state has rules about how to use them  You may choose a combination of any of the following:  · Living will:   This is a written record of the treatment you want  You can also choose which treatments you do not want, which to limit, and which to stop at a certain time  This includes surgery, medicine, IV fluid, and tube feedings  · Durable power of  for healthcare [facility]): This is a written record that states who you want to make healthcare choices for you when you are unable to make them for yourself  This person, called a proxy, is usually a family member or a friend  You may choose more than 1 proxy  · Do not resuscitate (DNR) order:  A DNR order is used in case your heart stops beating or you stop breathing  It is a request not to have certain forms of treatment, such as CPR  A DNR order may be included in other types of advance directives  · Medical directive: This covers the care that you want if you are in a coma, near death, or unable to make decisions for yourself  You can list the treatments you want for each condition  Treatment may include pain medicine, surgery, blood transfusions, dialysis, IV or tube feedings, and a ventilator (breathing machine)  · Values history: This document has questions about your views, beliefs, and how you feel and think about life  This information can help others choose the care that you would choose  Why are advance directives important? An advance directive helps you control your care  Although spoken wishes may be used, it is better to have your wishes written down  Spoken wishes can be misunderstood, or not followed  Treatments may be given even if you do not want them  An advance directive may make it easier for your family to make difficult choices about your care  Urinary Incontinence   Urinary incontinence (UI)  is when you lose control of your bladder  UI develops because your bladder cannot store or empty urine properly  The 3 most common types of UI are stress incontinence, urge incontinence, or both  Medicines:   · May be given to help strengthen your bladder control  Report any side effects of medication to your healthcare provider  Do pelvic muscle exercises often:  Your pelvic muscles help you stop urinating  Squeeze these muscles tight for 5 seconds, then relax for 5 seconds  Gradually work up to squeezing for 10 seconds  Do 3 sets of 15 repetitions a day, or as directed  This will help strengthen your pelvic muscles and improve bladder control  Train your bladder:  Go to the bathroom at set times, such as every 2 hours, even if you do not feel the urge to go  You can also try to hold your urine when you feel the urge to go  For example, hold your urine for 5 minutes when you feel the urge to go  As that becomes easier, hold your urine for 10 minutes  Self-care:   · Keep a UI record  Write down how often you leak urine and how much you leak  Make a note of what you were doing when you leaked urine  · Drink liquids as directed  You may need to limit the amount of liquid you drink to help control your urine leakage  Do not drink any liquid right before you go to bed  Limit or do not have drinks that contain caffeine or alcohol  · Prevent constipation  Eat a variety of high-fiber foods  Good examples are high-fiber cereals, beans, vegetables, and whole-grain breads  Walking is the best way to trigger your intestines to have a bowel movement  · Exercise regularly and maintain a healthy weight  Weight loss and exercise will decrease pressure on your bladder and help you control your leakage  · Use a catheter as directed  to help empty your bladder  A catheter is a tiny, plastic tube that is put into your bladder to drain your urine  · Go to behavior therapy as directed  Behavior therapy may be used to help you learn to control your urge to urinate  © Copyright [employer] 2018 Information is for End User's use only and may not be sold, redistributed or otherwise used for commercial purposes   All illustrations and images included in CareNotes® are the copyrighted property of A D A M , Inc  or [address] [place], [place] [address]

## 2022-05-02 NOTE — PROGRESS NOTES
Assessment and Plan:     Problem List Items Addressed This Visit        Endocrine    Hypothyroidism due to Hashimoto's thyroiditis     Recheck levothyroxine next visit  Relevant Orders    TSH, 3rd generation with Free T4 reflex    TSH, 3rd generation with Free T4 reflex       Musculoskeletal and Integument    Age-related osteoporosis without current pathological fracture     Continue prolia with rheumatology  Rheumatoid arthritis involving multiple sites with positive rheumatoid factor (HCC)     Continue methotrexate, prednisone and plaquenil through rheum  Other    Encounter for annual wellness exam in Medicare patient - Primary     Colonoscopy never done and pt refuses  Mammogram overdue and pt refuses  Aged out of paps  Has osteoporosis and does not require screening  Other Visit Diagnoses     Anemia, unspecified type        Relevant Medications    folic acid (FOLVITE) 1 mg tablet    Other Relevant Orders    Iron Panel (Includes Ferritin, Iron Sat%, Iron, and TIBC)    CBC and differential    Screening for hyperlipidemia        Relevant Orders    Lipid panel    Impaired fasting blood sugar        Relevant Orders    Hemoglobin N6U    Basic metabolic panel           Preventive health issues were discussed with patient, and age appropriate screening tests were ordered as noted in patient's After Visit Summary  Personalized health advice and appropriate referrals for health education or preventive services given if needed, as noted in patient's After Visit Summary       History of Present Illness:     Patient presents for Medicare Annual Wellness visit    Patient Care Team:  Cynthia Rico DO as PCP - General (Internal Medicine)     Problem List:     Patient Active Problem List   Diagnosis    Anxiety    RLS (restless legs syndrome)    S/P total hip arthroplasty    Hypothyroidism due to Hashimoto's thyroiditis    Age-related osteoporosis without current pathological fracture    RBBB    Ambulatory dysfunction    Closed compression fracture of L3 lumbar vertebra    Rheumatoid arthritis involving multiple sites with positive rheumatoid factor (HCC)    Chronic pain syndrome    Vitamin D deficiency    Dyspepsia    Other forms of systemic lupus erythematosus (HCC)    Depression    Acquired subluxation of left shoulder    Rupture long head biceps tendon, left, initial encounter    S/P reverse total shoulder arthroplasty, left    Encounter for annual wellness exam in Medicare patient    Shoulder pain, bilateral    Failed orthopedic implant ([facility] [address] )    Venous insufficiency      Past Medical and Surgical History:     Past Medical History:   Diagnosis Date    Acute blood loss anemia 9/9/2020    Aftercare following joint replacement 9/9/2020    Patient had right reversed total shoulder arthroplasty   Pain was controlled when he left the hospital       Anxiety     Arthritis     Depression     Disease of thyroid gland     HYPO    Femur neck fracture (HCC)     GERD (gastroesophageal reflux disease)     Hyperthyroidism     RESOLVED: [id number]    Osteoporosis     Polio     PVD (peripheral vascular disease) ([facility] [address] )     Right BBB/left ant fasc block     UTI (urinary tract infection)     Varicella infection     LAST ASSESSED: [id number]     Past Surgical History:   Procedure Laterality Date    APPENDECTOMY      HIP ARTHROPLASTY Left 11/27/2017    Procedure: REMOVAL IM NAIL CONVERSION TO TOTAL HIP ARTHROPLASTY POSTERIOR APPROACH;  Surgeon: Morgan Arechiga MD;  [facility];  Service: Orthopedics    HIP FRACTURE SURGERY      HIP SURGERY      both hips replaced;2013 left ,2014 right    JOINT REPLACEMENT Right     HIP TOTAL    LA CONV PREV HIP SURG TO TOT HIP Lizzie Blackman Left 10/4/2017    Procedure: Sherry Prost removal of left hip;  Surgeon: Morgan Arechiga MD;  [facility];  Service: Orthopedics    [address] Right 9/2/2020 Procedure: ARTHROPLASTY SHOULDER REVERSE;  Surgeon: Heydi Wing MD;  [facility];  Service: Orthopedics    MT RECONSTR TOTAL SHOULDER IMPLANT Left 6/1/2021    Procedure: ARTHROPLASTY SHOULDER REVERSE;  Surgeon: Heydi Wing MD;  [facility];  Service: Orthopedics    REVISION TOTAL HIP ARTHROPLASTY Right     TONSILLECTOMY        Family History:     Family History   Problem Relation Age of Onset    Rheum arthritis Mother     Rheum arthritis Sister     Prostate cancer Brother       Social History:     Social History     Socioeconomic History    Marital status:      Spouse name: Not on file    Number of children: 1    Years of education: Not on file    Highest education level: Not on file   Occupational History    Occupation: RETIRED    Tobacco Use    Smoking status: Former Smoker    Smokeless tobacco: Never Used    Tobacco comment: quit 20-30 years ago   Vaping Use    Vaping Use: Never used   Substance and Sexual Activity    Alcohol use: Not Currently    Drug use: Not Currently    Sexual activity: Not Currently   Other Topics Concern    Not on file   Social History Narrative    Always uses seat belt    Caffeine use    Yazidi    Single as per all scripts      Social Determinants of Health     Financial Resource Strain: Low Risk     Difficulty of Paying Living Expenses: Not hard at all   Food Insecurity: No Food Insecurity    Worried About Running Out of Food in the Last Year: Never true    Scarlett of Food in the Last Year: Never true   Transportation Needs: No Transportation Needs    Lack of Transportation (Medical): No    Lack of Transportation (Non-Medical): No   Physical Activity: Inactive    Days of Exercise per Week: 0 days    Minutes of Exercise per Session: 0 min   Stress: No Stress Concern Present    Feeling of Stress : Not at all   Social Connections: Socially Isolated    Frequency of Communication with Friends and Family:  Three times a week  Frequency of Social Gatherings with Friends and Family: Twice a week    Attends Hinduism Services: Never    Active Member of Clubs or Organizations: No    Attends Club or Organization Meetings: Never    Marital Status:    Intimate Partner Violence: Not At Risk    Fear of Current or Ex-Partner: No    Emotionally Abused: No    Physically Abused: No    Sexually Abused: No   Housing Stability: Not on file      Medications and Allergies:     Current Outpatient Medications   Medication Sig Dispense Refill    acetaminophen (TYLENOL) 325 mg tablet Take 3 tablets (975 mg total) by mouth every 8 (eight) hours  0    Ascorbic Acid, Vitamin C, (VITAMIN C) 100 MG tablet Take 400 mg by mouth daily      furosemide (LASIX) 20 mg tablet Take 1 tablet (20 mg total) by mouth daily 90 tablet 1    hydroxychloroquine (PLAQUENIL) 200 mg tablet 1 pill am 1/2 pill pm      levothyroxine 25 mcg tablet Take 1 tablet (25 mcg total) by mouth daily 90 tablet 1    methotrexate 2 5 MG tablet 3 tablets by mouth once a week on Thursday      Multiple Vitamins-Minerals (CENTRUM SILVER PO) Take 1 tablet by mouth daily      nortriptyline (PAMELOR) 50 mg capsule Take 2 capsules (100 mg total) by mouth daily at bedtime 90 capsule 1    potassium chloride (MICRO-K) 10 MEQ CR capsule Take 1 capsule (10 mEq total) by mouth daily 90 capsule 3    predniSONE 5 mg tablet Take 5 mg by mouth daily      rOPINIRole (REQUIP) 2 mg tablet Take 1 tablet (2 mg total) by mouth daily at bedtime Take 1 tablet by mouth daily at bedtime 90 tablet 1    ferrous gluconate (FERGON) 240 (27 FE) MG tablet Take 1 tablet (240 mg total) by mouth in the morning (Patient not taking: Reported on 5/2/2022 ) 90 tablet 1    folic acid (FOLVITE) 1 mg tablet Take 2,000 mcg by mouth daily      methocarbamol (ROBAXIN) 500 mg tablet Take 1 tablet (500 mg total) by mouth daily as needed for muscle spasms 90 tablet 3    methocarbamol (ROBAXIN) 750 mg tablet No current facility-administered medications for this visit  No Known Allergies   Immunizations:     Immunization History   Administered Date(s) Administered    COVID-19 PFIZER VACCINE 0 3 ML IM 04/09/2021, 05/04/2021    COVID-19 Pfizer vac (Joseph-sucrose, gray cap) 12 yr+ IM 04/16/2022    DTP 10/27/2010    INFLUENZA 10/11/2015, 09/23/2016, 09/21/2018    Influenza, high dose seasonal 0 7 mL 10/20/2021    Influenza, seasonal, injectable 10/27/2010, 09/23/2017    Pneumococcal Conjugate 13-Valent 05/16/2017, 09/23/2017    Pneumococcal Polysaccharide PPV23 11/03/2004, 09/28/2018    Tdap 03/11/2016, 12/26/2021    Zoster Vaccine Recombinant 10/20/2020, 01/07/2021    influenza, trivalent, adjuvanted 09/21/2018, 10/05/2019      Health Maintenance:         Topic Date Due    Hepatitis C Screening  07/20/2022 (Originally 1945)     There are no preventive care reminders to display for this patient  Medicare Health Risk Assessment:     /60 (BP Location: Left arm, Patient Position: Sitting, Cuff Size: Adult)   Pulse 96   Temp (!) 95 9 °F (35 5 °C) (Tympanic)   Resp 16   Ht 4' 10" (1 473 m)   Wt 43 1 kg (95 lb)   SpO2 99%   BMI 19 86 kg/m²      Celestina Walker is here for her Subsequent Wellness visit  Last Medicare Wellness visit information reviewed, patient interviewed, no change since last AWV  Health Risk Assessment:   Patient rates overall health as fair  Patient feels that their physical health rating is same  Patient is satisfied with their life  Eyesight was rated as same  Hearing was rated as same  Patient feels that their emotional and mental health rating is same  Patients states they are never, rarely angry  Patient states they are never, rarely unusually tired/fatigued  Pain experienced in the last 7 days has been none  Patient states that she has experienced no weight loss or gain in last 6 months  Fall Risk Screening:    In the past year, patient has experienced: no history of falling in past year      Urinary Incontinence Screening:   Patient has leaked urine accidently in the last six months  Frequent urination     Home Safety:  Patient has trouble with stairs inside or outside of their home  Patient has working smoke alarms and has working carbon monoxide detector  Home safety hazards include: none  Nutrition:   Current diet is Regular  Medications:   Patient is currently taking over-the-counter supplements  OTC medications include: see medication list  Patient is able to manage medications  Activities of Daily Living (ADLs)/Instrumental Activities of Daily Living (IADLs):   Walk and transfer into and out of bed and chair?: Yes  Dress and groom yourself?: Yes    Bathe or shower yourself?: No    Feed yourself?  Yes  Do your laundry/housekeeping?: Yes  Manage your money, pay your bills and track your expenses?: Yes  Make your own meals?: Yes    Do your own shopping?: No    Previous Hospitalizations:   Any hospitalizations or ED visits within the last 12 months?: No      Advance Care Planning:   Living will: Yes    Advanced directive: Yes      Cognitive Screening:   Provider or family/friend/caregiver concerned regarding cognition?: No    PREVENTIVE SCREENINGS      Cardiovascular Screening:    General: Screening Current      Diabetes Screening:     General: Screening Current      Colorectal Cancer Screening:     General: Patient Declines      Breast Cancer Screening:     General: Risks and Benefits Discussed    Due for: Mammogram        Cervical Cancer Screening:    General: Screening Not Indicated      Osteoporosis Screening:    General: Screening Not Indicated and History Osteoporosis      Abdominal Aortic Aneurysm (AAA) Screening:        General: Screening Not Indicated      Lung Cancer Screening:     General: Screening Not Indicated      Hepatitis C Screening:    General: Screening Current    Screening, Brief Intervention, and Referral to Treatment (SBIRT)    Screening  Typical number of drinks in a day: 0  Typical number of drinks in a week: 0  Interpretation: Low risk drinking behavior  Single Item Drug Screening:  How often have you used an illegal drug (including marijuana) or a prescription medication for non-medical reasons in the past year? never    Single Item Drug Screen Score: 0  Interpretation: Negative screen for possible drug use disorder    Brief Intervention  Alcohol & drug use screenings were reviewed  No concerns regarding substance use disorder identified         Liliana Barr MD

## 2022-05-04 NOTE — RESULT ENCOUNTER NOTE
Please call patient with results  She is mildly anemia  Please restart iron supplements every other day

## 2022-05-09 ENCOUNTER — TELEPHONE (OUTPATIENT)
Dept: INTERNAL MEDICINE CLINIC | Facility: CLINIC | Age: 77
End: 2022-05-09

## 2022-05-09 ENCOUNTER — TELEPHONE (OUTPATIENT)
Dept: FAMILY MEDICINE CLINIC | Facility: CLINIC | Age: 77
End: 2022-05-09

## 2022-05-09 NOTE — TELEPHONE ENCOUNTER
Called and provided lab results to patient;s daughter Roger Cruz, she will advised he r mother to start taking the Iron supplements every other day

## 2022-05-09 NOTE — TELEPHONE ENCOUNTER
----- Message from Cj Decker MD sent at 5/4/2022 10:14 AM EDT -----  Please call patient with results  She is mildly anemia  Please restart iron supplements every other day

## 2022-05-13 ENCOUNTER — TELEPHONE (OUTPATIENT)
Dept: FAMILY MEDICINE CLINIC | Facility: CLINIC | Age: 77
End: 2022-05-13

## 2022-05-13 NOTE — TELEPHONE ENCOUNTER
I dont believe we discussed restless leg syndrome at the appt  Taking iron supplementation, stretching and exercise can help

## 2022-05-13 NOTE — TELEPHONE ENCOUNTER
Patient is aware you can't increase the robaxin but is there anything else you can do for her restless leg syndrome so she can feel better

## 2022-05-19 ENCOUNTER — HOSPITAL ENCOUNTER (OUTPATIENT)
Dept: INFUSION CENTER | Facility: HOSPITAL | Age: 77
Discharge: HOME | End: 2022-05-19
Payer: MEDICARE

## 2022-05-19 PROCEDURE — 96372 THER/PROPH/DIAG INJ SC/IM: CPT

## 2022-05-19 RX ORDER — IBUPROFEN 400 MG/1
TABLET ORAL DAILY
COMMUNITY
End: 2022-05-24

## 2022-05-19 RX ADMIN — DENOSUMAB 60 MG: 60 INJECTION SUBCUTANEOUS at 09:35

## 2022-05-19 NOTE — PROGRESS NOTES
Pt tolerated Prolia today without incident  Per pt's niece, she is transitioning to new dr's office that will be handling Prolia injections going forward

## 2022-05-19 NOTE — PRE-PROCEDURE INSTRUCTIONS
Pre-Surgery Instructions:   Medication Instructions    acetaminophen (TYLENOL) 325 mg tablet Uses PRN- OK to take day of surgery    Ascorbic Acid, Vitamin C, (VITAMIN C) 100 MG tablet Stop taking 7 days prior to surgery   folic acid (FOLVITE) 1 mg tablet Hold day of surgery   hydroxychloroquine (PLAQUENIL) 200 mg tablet Hold day of surgery   ibuprofen (MOTRIN) 400 mg tablet Stop taking 7 days prior to surgery   levothyroxine 25 mcg tablet Take day of surgery   methocarbamol (ROBAXIN) 750 mg tablet Uses PRN- OK to take day of surgery    methotrexate 2 5 MG tablet Hold day of surgery   Multiple Vitamins-Minerals (CENTRUM SILVER PO) Stop taking 7 days prior to surgery   nortriptyline (PAMELOR) 50 mg capsule Hold day of surgery   predniSONE 5 mg tablet Hold day of surgery   rOPINIRole (REQUIP) 2 mg tablet Hold day of surgery  Spoke with patient medication list reviewed  Npo after midnight  Stop all vitamins and nsaids 1 week prior to surgery  Ok to take meds as instructedDOS if wanted with sm sip of water  pt has surgical soap and understands shower instructions  Not to apply powder creams deodorant after showers  Use clean sheets towels and clothes  bring photo id and insuranace card  No jewlery or contacts  Pt is vaccinated and denies covid symptoms  1 vaccinated adult allowed  Pt must have ride home after surgery  [facility] will call 5/23 with arrival time and directions

## 2022-05-24 ENCOUNTER — TELEPHONE (OUTPATIENT)
Dept: OBGYN CLINIC | Facility: HOSPITAL | Age: 77
End: 2022-05-24

## 2022-05-24 ENCOUNTER — HOSPITAL ENCOUNTER (OUTPATIENT)
Facility: HOSPITAL | Age: 77
Setting detail: OUTPATIENT SURGERY
Discharge: HOME | End: 2022-05-24
Attending: ORTHOPAEDIC SURGERY | Admitting: ORTHOPAEDIC SURGERY
Payer: MEDICARE

## 2022-05-24 VITALS
WEIGHT: 96 LBS | DIASTOLIC BLOOD PRESSURE: 72 MMHG | TEMPERATURE: 98.6 F | HEIGHT: 58 IN | OXYGEN SATURATION: 98 % | HEART RATE: 86 BPM | BODY MASS INDEX: 20.15 KG/M2 | RESPIRATION RATE: 16 BRPM | SYSTOLIC BLOOD PRESSURE: 151 MMHG

## 2022-05-24 DIAGNOSIS — G56.01 CARPAL TUNNEL SYNDROME ON RIGHT: Primary | ICD-10-CM

## 2022-05-24 PROCEDURE — 99024 POSTOP FOLLOW-UP VISIT: CPT | Performed by: PHYSICIAN ASSISTANT

## 2022-05-24 PROCEDURE — 29848 WRIST ENDOSCOPY/SURGERY: CPT | Performed by: ORTHOPAEDIC SURGERY

## 2022-05-24 RX ORDER — HYDROCODONE BITARTRATE AND ACETAMINOPHEN 5; 325 MG/1; MG/1
1 TABLET ORAL EVERY 6 HOURS PRN
Qty: 5 TABLET | Refills: 0 | Status: SHIPPED | OUTPATIENT
Start: 2022-05-24 | End: 2022-05-26 | Stop reason: ALTCHOICE

## 2022-05-24 RX ORDER — NAPROXEN SODIUM 220 MG
220 TABLET ORAL 2 TIMES DAILY WITH MEALS
Qty: 20 TABLET | Refills: 0 | Status: SHIPPED | OUTPATIENT
Start: 2022-05-24 | End: 2022-06-15 | Stop reason: ALTCHOICE

## 2022-05-24 RX ORDER — HYDROCODONE BITARTRATE AND ACETAMINOPHEN 5; 325 MG/1; MG/1
1 TABLET ORAL EVERY 6 HOURS PRN
Qty: 5 TABLET | Refills: 0 | Status: SHIPPED | OUTPATIENT
Start: 2022-05-24 | End: 2022-05-24 | Stop reason: SDUPTHER

## 2022-05-24 RX ORDER — SENNOSIDES 8.6 MG
650 CAPSULE ORAL EVERY 8 HOURS PRN
Qty: 30 TABLET | Refills: 0 | Status: SHIPPED | OUTPATIENT
Start: 2022-05-24 | End: 2022-05-24 | Stop reason: SDUPTHER

## 2022-05-24 RX ORDER — NAPROXEN SODIUM 220 MG
220 TABLET ORAL 2 TIMES DAILY WITH MEALS
Qty: 20 TABLET | Refills: 0 | Status: SHIPPED | OUTPATIENT
Start: 2022-05-24 | End: 2022-05-24 | Stop reason: SDUPTHER

## 2022-05-24 RX ORDER — LIDOCAINE HYDROCHLORIDE AND EPINEPHRINE 10; 10 MG/ML; UG/ML
20 INJECTION, SOLUTION INFILTRATION; PERINEURAL ONCE
Status: DISCONTINUED | OUTPATIENT
Start: 2022-05-24 | End: 2022-05-24 | Stop reason: HOSPADM

## 2022-05-24 RX ORDER — SENNOSIDES 8.6 MG
650 CAPSULE ORAL EVERY 8 HOURS PRN
Qty: 30 TABLET | Refills: 0 | Status: SHIPPED | OUTPATIENT
Start: 2022-05-24

## 2022-05-24 RX ADMIN — LIDOCAINE HYDROCHLORIDE,EPINEPHRINE BITARTRATE: 10; .01 INJECTION, SOLUTION INFILTRATION; PERINEURAL at 09:36

## 2022-05-24 NOTE — DISCHARGE INSTRUCTIONS
Post Operative Instructions    You have had surgery on your arm today, please read and follow the information below:  Elevate your hand above your elbow during the next 24-48 hours to help with swelling  Place your hand and arm over your head with motion at your shoulder three times a day  Do not apply any cream/ointment/oil to your incisions including antibiotics  Do not soak your hands in standing water (dishwater, tubs, Jacuzzi's, pools, etc ) until given permission (typically 2-3 weeks after injury)    Call the office if you notice any:  Increased numbness or tingling of your hand or fingers that is not relieved with elevation  Increasing pain that is not controlled with medication  Difficulty chewing, breathing, swallowing  Chest pains or shortness of breath  Fever over 101 4 degrees  Bandage: Remove bandage after 5 days  Motion: Move fingers into a fist 5 times a day, DO NOT move any splinted fingers  Weight bearing status: Avoid heavy lifting (>5 pounds) with the extremity that was operated on until follow up appointment  Normal activities of daily living are OK  Ice: Ice for 10 minutes every hour as needed for swelling x 24 hours  Sling: No sling necessary  Medications:   Naproxen 220 mg two times a day   Tylenol Extended Release 650 mg every 8 hours  Norco/Hydrocodone one tab every 6 hours AS NEEDED for pain     Follow-up Appointment: 7-10 days  Please call the office if you have any questions or concerns regarding your post-operative care

## 2022-05-24 NOTE — TELEPHONE ENCOUNTER
Pt sees Dr Alena May from [facility] is calling stating that they Need an attending on the script for the Hydrocodone name and license on script and if the PA is writing the script they also need to no if it is limited or ongoing  #[phone]

## 2022-05-24 NOTE — H&P
H&P Exam - Trevin Favorite Colon 67 y o  female MRN: [id number]  Unit/Bed#: P308 PRE    Assessment/Plan   Assessment:  Right carpal tunnel syndrome  Plan:  Right endoscopic carpal tunnel release    History of Present Illness   HPI:  Mark Sweeney is a 67 y o  female who presents with numbness and tingling in the right hand  Patient has not responded to non-operative modalities  She would like to proceed with surgery  Historical Information  Review Of Systems:   · Skin: Normal  · Neuro: See HPI  · Musculoskeletal: See HPI  · 14 point review of systems negative except as stated above     Past Medical History:   Past Medical History:   Diagnosis Date    Acute blood loss anemia 9/9/2020    Aftercare following joint replacement 9/9/2020    Patient had right reversed total shoulder arthroplasty   Pain was controlled when he left the hospital       Anxiety     Arthritis     Depression     Disease of thyroid gland     HYPO    Femur neck fracture (HCC)     GERD (gastroesophageal reflux disease)     Hyperthyroidism     RESOLVED: [id number]    Osteoporosis     Polio     PVD (peripheral vascular disease) ([facility] [address] )     Right BBB/left ant fasc block     UTI (urinary tract infection)     Varicella infection     LAST ASSESSED: [id number]       Past Surgical History:   Past Surgical History:   Procedure Laterality Date    APPENDECTOMY      HIP ARTHROPLASTY Left 11/27/2017    Procedure: REMOVAL IM NAIL CONVERSION TO TOTAL HIP ARTHROPLASTY POSTERIOR APPROACH;  Surgeon: Olive Gutierrez MD;  [facility];  Service: Orthopedics    HIP FRACTURE SURGERY      HIP SURGERY      both hips replaced;2013 left ,2014 right    JOINT REPLACEMENT Right     HIP TOTAL    WA CONV PREV HIP SURG TO TOT HIP Yecenia Punch Left 10/4/2017    Procedure: [place] removal of left hip;  Surgeon: Olive Gutierrez MD;  [facility];  Service: Orthopedics    WA RECONSTR TOTAL SHOULDER IMPLANT Right 9/2/2020    Procedure: ARTHROPLASTY SHOULDER REVERSE;  Surgeon: Heydi Wing MD;  [facility];  Service: Orthopedics    OR RECONSTR TOTAL SHOULDER IMPLANT Left 6/1/2021    Procedure: ARTHROPLASTY SHOULDER REVERSE;  Surgeon: Heydi Wing MD;  [facility];  Service: Orthopedics    REVISION TOTAL HIP ARTHROPLASTY Right     TONSILLECTOMY         Family History:  Family history reviewed and non-contributory  Family History   Problem Relation Age of Onset    Rheum arthritis Mother     Rheum arthritis Sister     Prostate cancer Brother        Social History:  Social History     Socioeconomic History    Marital status:      Spouse name: None    Number of children: 1    Years of education: None    Highest education level: None   Occupational History    Occupation: RETIRED    Tobacco Use    Smoking status: Former Smoker    Smokeless tobacco: Never Used    Tobacco comment: quit 20-30 years ago   Vaping Use    Vaping Use: Never used   Substance and Sexual Activity    Alcohol use: Not Currently    Drug use: Not Currently    Sexual activity: Not Currently   Other Topics Concern    None   Social History Narrative    Always uses seat belt    Caffeine use    Jainism    Single as per all scripts      Social Determinants of Health     Financial Resource Strain: Low Risk     Difficulty of Paying Living Expenses: Not hard at all   Food Insecurity: No Food Insecurity    Worried About Running Out of Food in the Last Year: Never true    Scarlett of Food in the Last Year: Never true   Transportation Needs: No Transportation Needs    Lack of Transportation (Medical): No    Lack of Transportation (Non-Medical):  No   Physical Activity: Not on file   Stress: Not on file   Social Connections: Not on file   Intimate Partner Violence: Not on file   Housing Stability: Not on file       Allergies:   No Known Allergies        Labs:  0   Lab Value Date/Time    HCT 34 7 (L) 03/08/2022 1121    HCT 36 6 12/27/2021 0508    HCT 34 6 (L) 12/26/2021 0937    HCT 36 8 12/16/2014 0727    HCT 28 8 (L) 10/30/2014 0626    HCT 29 2 (L) 10/27/2014 0648    HGB 11 1 (L) 03/08/2022 1121    HGB 11 2 (L) 12/27/2021 0508    HGB 10 7 (L) 12/26/2021 0937    HGB 11 9 12/16/2014 0727    HGB 8 8 (L) 10/30/2014 0626    HGB 9 0 (L) 10/27/2014 0648    INR 0 84 12/26/2021 0752    INR 0 96 10/13/2014 0608    WBC 8 12 03/08/2022 1121    WBC 7 88 12/27/2021 0508    WBC 8 69 12/26/2021 0937    WBC 5 60 12/16/2014 0727    WBC 5 58 10/30/2014 0626    WBC 6 95 10/27/2014 0648    ESR 8 03/08/2022 1121    CRP <3 0 03/08/2022 1121       Meds:    Current Facility-Administered Medications:     lidocaine-epinephrine (XYLOCAINE/EPINEPHRINE) 1 %-1:100,000 20 mL, sodium bicarbonate 2 mEq infiltration, , Infiltration, Once, Jad Bennett MD    lidocaine-epinephrine (XYLOCAINE/EPINEPHRINE) 1 %-1:100,000 injection 20 mL, 20 mL, Infiltration, Once, Jad Bennett MD    Blood Culture:   Lab Results   Component Value Date    BLOODCX No Growth After 5 Days  12/02/2019    BLOODCX No Growth After 5 Days  12/02/2019       Wound Culture:   No results found for: WOUNDCULT    Ins and Outs:  No intake/output data recorded  Physical Exam  /70   Pulse 79   Temp 98 °F (36 7 °C) (Temporal)   Resp 20   Ht 4' 10" (1 473 m)   Wt 43 5 kg (96 lb)   SpO2 97%   BMI 20 06 kg/m²   /70   Pulse 79   Temp 98 °F (36 7 °C) (Temporal)   Resp 20   Ht 4' 10" (1 473 m)   Wt 43 5 kg (96 lb)   SpO2 97%   BMI 20 06 kg/m²   Gen: Alert and oriented to person, place, time  HEENT: EOMI, eyes clear, moist mucus membranes, hearing intact  Respiratory: Bilateral chest rise   No audible wheezing found  Cardiovascular: Regular Rate and Rhythm  Abdomen: soft nontender/nondistended  Ortho Exam: positive tinels carpal tunnel  Neuro Exam: weakness APB    Lab Results: Reviewed  Imaging: Reviewed

## 2022-05-24 NOTE — TELEPHONE ENCOUNTER
Arlette Otto pt care giver is calling asking for the pt medication to be sent to the pharmacy  Arlette Otto states that they have been waiting all day   It is for all of the medication that was prescribed today    #[phone]

## 2022-05-24 NOTE — OP NOTE
OPERATIVE REPORT  PATIENT NAME: Alfredito Rivera  :  1945  MRN: [id number]  Pt Location: [facility]    SURGERY DATE: 22    Surgeon(s) and Role:     * Kyle Dejesus MD - Primary    Pre-Op Diagnosis:  Carpal tunnel syndrome on right [G56 01]    Post-Op Diagnosis:   Carpal tunnel syndrome on right [G56 01]    Procedure(s) (LRB):  RELEASE CARPAL TUNNEL ENDOSCOPIC-right (Right)    Specimen(s):  * No orders in the log *    Estimated Blood Loss:   Minimal      Anesthesia Type:   General    Operative Indications: The patient has a history of Carpal Tunnel Syndrome  right that was recalcitrant to conservative management  The decision was made to bring the patient to the operating room for Endoscopic Carpal Tunnel Release  right  Risks of the procedure were explained which include, but are not limited to bleeding; infection; damage to nerves, arteries,veins, tendons; scar; pain; need for reoperation; failure to give desired result; and risks of anaesthesia  All questions were answered to satisfaction and they were willing to proceed  Operative Findings:  Right carpal tunnel    Complications:   None    Procedure and Technique:  After the patient, site, and procedure were identified, the patient was brought into the operating room in a supine position  Local anaesthesia was adminstered in the preoperative holding area  A tourniquet was not used  The  right upper extremity was then prepped and drapped in a normal, sterile, orthopedic fashion  After reconfirmation of the patient, site, and surgical procedure, which was agreed upon by the entire surgical team, attention was turned to the right wrist   The sites of the proximal and distal incisions were marked    The price of the proximal incision was placed horizontally at the midline of the wrist   The distal incision price was longitudinal extending distally from the point of intersection of the line between the long finger and ring finger and the line along the distal border of the fully abducted thumb  The proximal incision was performed  Subcutaneous tissues were dissected  Then the transverse volar antebrachial fascia was perforated with a scalpel  The edges of the skin incision where retracted and the forearm fascia was incised for approximately 1 5 cm proximally with care taken to identify and protect the median nerve  Retractors were used to inspect the transverse carpal ligament distally  A curved Ramírez dissector was used to glide under the transverse carpal ligament and superficial to the median nerve with confirmation via the washboard feeling  Then the curved Ramírez was pushed into the palm toward the distal incision site  When the location of the distal skin price was adequate, the distal incision was made  Then with retraction of the skin, further dissection and perforation of the palmar fascia was performed with the use of tenotomy scissors  The curved Ramírez was guided from proximal to distal out the distal incisions without any twisting to allow for dilation of the tract  The curved Ramírez was removed, and the cannula for the camera was inserted along the same tract, making sure to keep the alignment post on the cannula perpendicular to the plane of the hand without twisting  Then while keeping the wrist in extension, and holding the cannula of the camera in place, the wrist was placed on the hyperextension board  The scope was inserted distally, and a cotton-tip applicator was used proximally to clean the tract as well as the scope  A curved cutting knife was introduced from proximal to distal while keeping visualization with the use of the camera  Without twisting of the canula, the knife was used to cut the transverse carpal ligament completely, making sure there were no remnant fibers  Then after this was accomplished, the hand was removed from the extension block    Three maneuvers were used to confirm the full release of the transverse carpal ligament  First, the ease of twisting the trocar of the camera confirmed the release of the ligament  Second, the curved Ramírez was introduced to make sure there were no remnant fibers that could be felt palmarly  Third, the scope was introduced again to visualize that the whole ligament was released proximally to distally  Additional confirmation of full release included retraction and inspection in the distal and proximal incisions to make sure there were no remnant fibers distally or proximally respectively  At the completion of the procedure, hemostasis was obtained with cautery and direct pressure  The wounds were copiously irrigated with sterile solution  The wounds were closed with Prolene  Sterile dressings were applied, including Xeroform, gauze, tweeners, webril, ACE  Please note, all sponge, needle, and instrument counts were correct prior to closure  Loupe magnification was utilized  The patient tolerated the procedure well       I was present for all critical portions of the procedure, A qualified resident physician was not available and A physician assistant was required during the procedure for retraction tissue handling,dissection and suturing    Patient Disposition:  APU and hemodynamically stable    SIGNATURE: Dino Teresa MD  DATE: 05/24/22  TIME: 10:19 AM

## 2022-05-26 ENCOUNTER — OFFICE VISIT (OUTPATIENT)
Dept: FAMILY MEDICINE CLINIC | Facility: CLINIC | Age: 77
End: 2022-05-26
Payer: MEDICARE

## 2022-05-26 VITALS
BODY MASS INDEX: 20.57 KG/M2 | SYSTOLIC BLOOD PRESSURE: 132 MMHG | TEMPERATURE: 97 F | HEART RATE: 95 BPM | WEIGHT: 98 LBS | OXYGEN SATURATION: 98 % | DIASTOLIC BLOOD PRESSURE: 76 MMHG | HEIGHT: 58 IN

## 2022-05-26 DIAGNOSIS — L03.115 CELLULITIS OF RIGHT LOWER EXTREMITY: ICD-10-CM

## 2022-05-26 DIAGNOSIS — L03.116 CELLULITIS OF LEFT LOWER EXTREMITY: Primary | ICD-10-CM

## 2022-05-26 DIAGNOSIS — S81.802A WOUND OF LEFT LOWER EXTREMITY, INITIAL ENCOUNTER: ICD-10-CM

## 2022-05-26 DIAGNOSIS — S81.801A LEG WOUND, RIGHT, INITIAL ENCOUNTER: ICD-10-CM

## 2022-05-26 PROCEDURE — 99215 OFFICE O/P EST HI 40 MIN: CPT | Performed by: FAMILY MEDICINE

## 2022-05-26 RX ORDER — AMOXICILLIN AND CLAVULANATE POTASSIUM 875; 125 MG/1; MG/1
1 TABLET, FILM COATED ORAL EVERY 12 HOURS SCHEDULED
Qty: 20 TABLET | Refills: 0 | Status: SHIPPED | OUTPATIENT
Start: 2022-05-26 | End: 2022-06-05

## 2022-05-26 NOTE — ASSESSMENT & PLAN NOTE
Patient appears to have a cellulitis of right and left lower extremities  Patient has history of rheumatoid arthritis and lupus  Started on Plaquenil and methotrexate as well as low dose prednisone  Discussed with patient's rheumatologist a about case, patient has a history of similar to symptoms in the past especially in the feet that required treatment with antibiotics and admission to hospital     In discussed with patient, advised patient to follow-up with Dermatology a for assessment of medication to see if this needs to be a adjusted or switched  In the meanwhile, will start patient on Augmentin antibiotics 875 mg b i d  for the next 10 days  Also put in referral to wound care for further assessment and treatment  Discussed with patient and aide that if symptoms worsen, patient should follow-up in ED  Follow-up in office in 2 weeks for re-evaluation

## 2022-05-26 NOTE — PROGRESS NOTES
Assessment/Plan:    Cellulitis of left lower extremity  Patient appears to have a cellulitis of right and left lower extremities  Patient has history of rheumatoid arthritis and lupus  Started on Plaquenil and methotrexate as well as low dose prednisone  Discussed with patient's rheumatologist a about case, patient has a history of similar to symptoms in the past especially in the feet that required treatment with antibiotics and admission to hospital     In discussed with patient, advised patient to follow-up with Dermatology a for assessment of medication to see if this needs to be a adjusted or switched  In the meanwhile, will start patient on Augmentin antibiotics 875 mg b i d  for the next 10 days  Also put in referral to wound care for further assessment and treatment  Discussed with patient and aide that if symptoms worsen, patient should follow-up in ED  Follow-up in office in 2 weeks for re-evaluation  Cellulitis of right lower extremity  Same plan as cellulitis of right LE    A total of 45 minutes spent on patient for encounter, coordination of care, reviewing chart and personal discussion with patient's rheumatologist regarding case  Diagnoses and all orders for this visit:    Cellulitis of left lower extremity  -     amoxicillin-clavulanate (AUGMENTIN) 875-125 mg per tablet; Take 1 tablet by mouth every 12 (twelve) hours for 10 days  -     CBC and differential; Future  -     Comprehensive metabolic panel; Future    Cellulitis of right lower extremity  -     amoxicillin-clavulanate (AUGMENTIN) 875-125 mg per tablet; Take 1 tablet by mouth every 12 (twelve) hours for 10 days  -     CBC and differential; Future  -     Comprehensive metabolic panel; Future    Wound of left lower extremity, initial encounter  -     Ambulatory Referral to Wound Care; Future    Leg wound, right, initial encounter  -     Ambulatory Referral to Wound Care;  Future        Subjective:   Chief Complaint   Patient presents with    Leg Pain     Health Maintenance   Topic Date Due    Falls: Plan of Care  04/20/2022    Hepatitis C Screening  07/20/2022 (Originally 1945)    COVID-19 Vaccine (4 - Booster for Pfizer series) 07/16/2022    Fall Risk  05/02/2023    Medicare Annual Wellness Visit (AWV)  05/02/2023    BMI: Adult  05/26/2023    DTaP,Tdap,and Td Vaccines (4 - Td or Tdap) 12/26/2031    Osteoporosis Screening  Completed    Pneumococcal Vaccine: 65+ Years  Completed    Influenza Vaccine  Completed    HIB Vaccine  Aged Out    Hepatitis B Vaccine  Aged Out    IPV Vaccine  Aged Out    Hepatitis A Vaccine  Aged Out    Meningococcal ACWY Vaccine  Aged Out    HPV Vaccine  Aged Out        Patient ID: Mark Sweeney is a 67 y o  female  HPI    Patient complains of bilateral lower extremity pain and swelling for the past month  Previously did have intermittent episodes of something similar that would resolve with a no intervention  The patient reports that over the past month a she developed wounds on both legs  Occasionally weeping  No antibiotic use in the past month  Has not recently been seen by wound care  A wonders if this is infection  Has been using Willard-Miller cream which helps with pain  Also recently seen in the ER and prescribed some Norco which she has been using sparingly when pain is at its worst     Patient is unable to a sits still or lay down due to pain  No trauma  The following portions of the patient's history were reviewed and updated as appropriate: allergies, current medications, past family history, past medical history, past social history, past surgical history, and problem list     Review of Systems   Constitutional: Negative for chills and fever  HENT: Negative for congestion  Respiratory: Negative for cough and shortness of breath  Cardiovascular: Negative for chest pain and palpitations  Gastrointestinal: Negative for diarrhea, nausea and vomiting  Genitourinary: Negative for dysuria  Musculoskeletal: Negative for myalgias  Skin: Negative for rash  Neurological: Negative for dizziness and headaches  Objective:  /76 (BP Location: Left arm, Patient Position: Sitting, Cuff Size: Adult)   Pulse 95   Temp (!) 97 °F (36 1 °C) (Tympanic)   Ht 4' 10" (1 473 m)   Wt 44 5 kg (98 lb)   SpO2 98%   BMI 20 48 kg/m²      Physical Exam  Vitals and nursing note reviewed  Constitutional:       General: She is not in acute distress  Eyes:      Conjunctiva/sclera: Conjunctivae normal    Cardiovascular:      Rate and Rhythm: Normal rate and regular rhythm  Pulses: Normal pulses  Heart sounds: No murmur heard  Comments: Dorsalis pedis and posterior tibial pulses intact bilaterally  No pain on palpation of either feet  Pulmonary:      Effort: Pulmonary effort is normal  No respiratory distress  Breath sounds: No wheezing, rhonchi or rales  Musculoskeletal:      Comments: Patient has bilateral lower extremity swelling with mild ulcerations  The some discharge noted on the bandage  Second a bandage on right leg not removed  Warm to touch  Blanches  Neurological:      Mental Status: She is alert  This note has been constructed using a voice recognition system  There may be translation, syntax, or grammatical errors  If you have an questions, please contact the dictating provider

## 2022-05-27 ENCOUNTER — TELEPHONE (OUTPATIENT)
Dept: INTERNAL MEDICINE CLINIC | Facility: CLINIC | Age: 77
End: 2022-05-27

## 2022-05-31 ENCOUNTER — TELEPHONE (OUTPATIENT)
Dept: FAMILY MEDICINE CLINIC | Facility: CLINIC | Age: 77
End: 2022-05-31

## 2022-05-31 DIAGNOSIS — L84 FOOT CALLUS: Primary | ICD-10-CM

## 2022-05-31 NOTE — TELEPHONE ENCOUNTER
Patient's niece Dena Birmingham called, she want to know If patient could have a referral placed for podiatry

## 2022-06-08 ENCOUNTER — OFFICE VISIT (OUTPATIENT)
Dept: OBGYN CLINIC | Facility: HOSPITAL | Age: 77
End: 2022-06-08

## 2022-06-08 ENCOUNTER — APPOINTMENT (OUTPATIENT)
Dept: LAB | Facility: HOSPITAL | Age: 77
End: 2022-06-08
Payer: MEDICARE

## 2022-06-08 VITALS — BODY MASS INDEX: 20.57 KG/M2 | HEIGHT: 58 IN | WEIGHT: 98 LBS

## 2022-06-08 DIAGNOSIS — E06.3 HYPOTHYROIDISM DUE TO HASHIMOTO'S THYROIDITIS: ICD-10-CM

## 2022-06-08 DIAGNOSIS — R73.01 IMPAIRED FASTING BLOOD SUGAR: ICD-10-CM

## 2022-06-08 DIAGNOSIS — E55.9 VITAMIN D DEFICIENCY: ICD-10-CM

## 2022-06-08 DIAGNOSIS — Z13.220 SCREENING FOR HYPERLIPIDEMIA: ICD-10-CM

## 2022-06-08 DIAGNOSIS — E03.8 HYPOTHYROIDISM DUE TO HASHIMOTO'S THYROIDITIS: ICD-10-CM

## 2022-06-08 DIAGNOSIS — D64.9 ANEMIA, UNSPECIFIED TYPE: ICD-10-CM

## 2022-06-08 DIAGNOSIS — M81.0 AGE-RELATED OSTEOPOROSIS WITHOUT CURRENT PATHOLOGICAL FRACTURE: ICD-10-CM

## 2022-06-08 DIAGNOSIS — L03.115 CELLULITIS OF RIGHT LOWER EXTREMITY: ICD-10-CM

## 2022-06-08 DIAGNOSIS — L03.116 CELLULITIS OF LEFT LOWER EXTREMITY: ICD-10-CM

## 2022-06-08 DIAGNOSIS — Z47.89 AFTERCARE FOLLOWING SURGERY OF THE MUSCULOSKELETAL SYSTEM: Primary | ICD-10-CM

## 2022-06-08 LAB
ALBUMIN SERPL BCP-MCNC: 3.4 G/DL (ref 3.5–5)
ALP SERPL-CCNC: 87 U/L (ref 46–116)
ALT SERPL W P-5'-P-CCNC: 21 U/L (ref 12–78)
ANION GAP SERPL CALCULATED.3IONS-SCNC: 9 MMOL/L (ref 4–13)
AST SERPL W P-5'-P-CCNC: 19 U/L (ref 5–45)
BASOPHILS # BLD AUTO: 0.03 THOUSANDS/ΜL (ref 0–0.1)
BASOPHILS NFR BLD AUTO: 0 % (ref 0–1)
BILIRUB SERPL-MCNC: 0.42 MG/DL (ref 0.2–1)
BUN SERPL-MCNC: 10 MG/DL (ref 5–25)
CALCIUM ALBUM COR SERPL-MCNC: 9.3 MG/DL (ref 8.3–10.1)
CALCIUM SERPL-MCNC: 8.8 MG/DL (ref 8.3–10.1)
CHLORIDE SERPL-SCNC: 100 MMOL/L (ref 100–108)
CO2 SERPL-SCNC: 26 MMOL/L (ref 21–32)
CREAT SERPL-MCNC: 0.47 MG/DL (ref 0.6–1.3)
EOSINOPHIL # BLD AUTO: 0.03 THOUSAND/ΜL (ref 0–0.61)
EOSINOPHIL NFR BLD AUTO: 0 % (ref 0–6)
ERYTHROCYTE [DISTWIDTH] IN BLOOD BY AUTOMATED COUNT: 15.9 % (ref 11.6–15.1)
GFR SERPL CREATININE-BSD FRML MDRD: 95 ML/MIN/1.73SQ M
GLUCOSE P FAST SERPL-MCNC: 80 MG/DL (ref 65–99)
HCT VFR BLD AUTO: 35.8 % (ref 34.8–46.1)
HGB BLD-MCNC: 10.9 G/DL (ref 11.5–15.4)
IMM GRANULOCYTES # BLD AUTO: 0.04 THOUSAND/UL (ref 0–0.2)
IMM GRANULOCYTES NFR BLD AUTO: 1 % (ref 0–2)
LYMPHOCYTES # BLD AUTO: 1.18 THOUSANDS/ΜL (ref 0.6–4.47)
LYMPHOCYTES NFR BLD AUTO: 15 % (ref 14–44)
MCH RBC QN AUTO: 27.7 PG (ref 26.8–34.3)
MCHC RBC AUTO-ENTMCNC: 30.4 G/DL (ref 31.4–37.4)
MCV RBC AUTO: 91 FL (ref 82–98)
MONOCYTES # BLD AUTO: 0.97 THOUSAND/ΜL (ref 0.17–1.22)
MONOCYTES NFR BLD AUTO: 13 % (ref 4–12)
NEUTROPHILS # BLD AUTO: 5.41 THOUSANDS/ΜL (ref 1.85–7.62)
NEUTS SEG NFR BLD AUTO: 71 % (ref 43–75)
NRBC BLD AUTO-RTO: 0 /100 WBCS
PLATELET # BLD AUTO: 369 THOUSANDS/UL (ref 149–390)
PMV BLD AUTO: 8.7 FL (ref 8.9–12.7)
POTASSIUM SERPL-SCNC: 3.5 MMOL/L (ref 3.5–5.3)
PROT SERPL-MCNC: 6.9 G/DL (ref 6.4–8.2)
RBC # BLD AUTO: 3.93 MILLION/UL (ref 3.81–5.12)
SODIUM SERPL-SCNC: 135 MMOL/L (ref 136–145)
WBC # BLD AUTO: 7.66 THOUSAND/UL (ref 4.31–10.16)

## 2022-06-08 PROCEDURE — 80053 COMPREHEN METABOLIC PANEL: CPT

## 2022-06-08 PROCEDURE — 99024 POSTOP FOLLOW-UP VISIT: CPT | Performed by: ORTHOPAEDIC SURGERY

## 2022-06-08 PROCEDURE — 36415 COLL VENOUS BLD VENIPUNCTURE: CPT

## 2022-06-08 PROCEDURE — 85025 COMPLETE CBC W/AUTO DIFF WBC: CPT

## 2022-06-08 NOTE — PROGRESS NOTES
Assessment:   S/P RELEASE CARPAL TUNNEL ENDOSCOPIC-right - Right on 5/24/2022    Plan:   Resume activities as tolerated    Discussed that it could take up to a year to see maximal improvement from surgery    Follow Up:  2-3 months    To Do Next Visit:  Assess post operative recovery       CHIEF COMPLAINT:  Chief Complaint   Patient presents with    Right Wrist - Post-op, Suture / Staple Removal     ECTR- 5/24/22         SUBJECTIVE:  Sudha Menjivar is a 67 y o  female who presents for follow up after [address] ENDOSCOPIC-right - Right on 5/24/2022  Today patient has no significant pain and limitations  She still has numbness and tingling in the fingers  She denies any other acute complaints         PHYSICAL EXAMINATION:  Vital signs: Ht 4' 10" (1 473 m)   Wt 44 5 kg (98 lb)   BMI 20 48 kg/m²   General: well developed and well nourished, alert, oriented times 3 and appears comfortable  Psychiatric: Normal    MUSCULOSKELETAL EXAMINATION:  Incision: Clean, dry, intact  Range of Motion: full composite fist possible  Neurovascular status: Neuro intact, good cap refill  Activity Restrictions: No restrictions  Done today: Sutures out and Steri strips applied      STUDIES REVIEWED:  No Studies to review      PROCEDURES PERFORMED:  Procedures  No Procedures performed today

## 2022-06-09 ENCOUNTER — HOSPITAL ENCOUNTER (EMERGENCY)
Facility: HOSPITAL | Age: 77
Discharge: HOME | End: 2022-06-10
Attending: EMERGENCY MEDICINE
Payer: MEDICARE

## 2022-06-09 DIAGNOSIS — M62.838 MUSCLE SPASM: ICD-10-CM

## 2022-06-09 DIAGNOSIS — G25.81 RESTLESS LEG SYNDROME: Primary | ICD-10-CM

## 2022-06-09 LAB
ANION GAP SERPL CALCULATED.3IONS-SCNC: 7 MMOL/L (ref 4–13)
BUN SERPL-MCNC: 9 MG/DL (ref 5–25)
CALCIUM SERPL-MCNC: 8.5 MG/DL (ref 8.3–10.1)
CHLORIDE SERPL-SCNC: 107 MMOL/L (ref 100–108)
CO2 SERPL-SCNC: 24 MMOL/L (ref 21–32)
CREAT SERPL-MCNC: 0.56 MG/DL (ref 0.6–1.3)
GFR SERPL CREATININE-BSD FRML MDRD: 90 ML/MIN/1.73SQ M
GLUCOSE SERPL-MCNC: 119 MG/DL (ref 65–140)
POTASSIUM SERPL-SCNC: 3.6 MMOL/L (ref 3.5–5.3)
SODIUM SERPL-SCNC: 138 MMOL/L (ref 136–145)

## 2022-06-09 PROCEDURE — 80048 BASIC METABOLIC PNL TOTAL CA: CPT | Performed by: EMERGENCY MEDICINE

## 2022-06-09 PROCEDURE — 36415 COLL VENOUS BLD VENIPUNCTURE: CPT | Performed by: EMERGENCY MEDICINE

## 2022-06-09 PROCEDURE — 99284 EMERGENCY DEPT VISIT MOD MDM: CPT | Performed by: EMERGENCY MEDICINE

## 2022-06-09 PROCEDURE — 96374 THER/PROPH/DIAG INJ IV PUSH: CPT

## 2022-06-09 PROCEDURE — 99284 EMERGENCY DEPT VISIT MOD MDM: CPT

## 2022-06-09 RX ORDER — FENTANYL CITRATE 50 UG/ML
50 INJECTION, SOLUTION INTRAMUSCULAR; INTRAVENOUS ONCE
Status: DISCONTINUED | OUTPATIENT
Start: 2022-06-09 | End: 2022-06-10 | Stop reason: HOSPADM

## 2022-06-09 RX ORDER — ACETAMINOPHEN 325 MG/1
975 TABLET ORAL ONCE
Status: COMPLETED | OUTPATIENT
Start: 2022-06-09 | End: 2022-06-09

## 2022-06-09 RX ORDER — KETOROLAC TROMETHAMINE 30 MG/ML
15 INJECTION, SOLUTION INTRAMUSCULAR; INTRAVENOUS ONCE
Status: COMPLETED | OUTPATIENT
Start: 2022-06-09 | End: 2022-06-09

## 2022-06-09 RX ORDER — POTASSIUM CHLORIDE 20 MEQ/1
40 TABLET, EXTENDED RELEASE ORAL ONCE
Status: COMPLETED | OUTPATIENT
Start: 2022-06-09 | End: 2022-06-10

## 2022-06-09 RX ORDER — DIAZEPAM 5 MG/ML
2.5 INJECTION, SOLUTION INTRAMUSCULAR; INTRAVENOUS ONCE
Status: DISCONTINUED | OUTPATIENT
Start: 2022-06-09 | End: 2022-06-09

## 2022-06-09 RX ORDER — ROPINIROLE 2 MG/1
2 TABLET, FILM COATED ORAL ONCE
Status: COMPLETED | OUTPATIENT
Start: 2022-06-09 | End: 2022-06-09

## 2022-06-09 RX ORDER — DIAZEPAM 2 MG/1
2 TABLET ORAL ONCE
Status: COMPLETED | OUTPATIENT
Start: 2022-06-09 | End: 2022-06-10

## 2022-06-09 RX ADMIN — KETOROLAC TROMETHAMINE 15 MG: 30 INJECTION, SOLUTION INTRAMUSCULAR; INTRAVENOUS at 21:23

## 2022-06-09 RX ADMIN — ROPINIROLE 2 MG: 2 TABLET, FILM COATED ORAL at 22:10

## 2022-06-09 RX ADMIN — ACETAMINOPHEN 975 MG: 325 TABLET ORAL at 21:24

## 2022-06-10 VITALS
TEMPERATURE: 97.2 F | HEART RATE: 68 BPM | OXYGEN SATURATION: 100 % | RESPIRATION RATE: 20 BRPM | SYSTOLIC BLOOD PRESSURE: 155 MMHG | DIASTOLIC BLOOD PRESSURE: 78 MMHG

## 2022-06-10 RX ADMIN — DIAZEPAM 2 MG: 2 TABLET ORAL at 00:55

## 2022-06-10 RX ADMIN — POTASSIUM CHLORIDE 40 MEQ: 1500 TABLET, EXTENDED RELEASE ORAL at 00:45

## 2022-06-10 NOTE — ED ATTENDING ATTESTATION
Final Diagnosis:  1  Restless leg syndrome    2  Muscle spasm           I, Lady Miguel MD, saw and evaluated the patient  All available labs and X-rays were ordered by me or the resident and have been reviewed by myself  I discussed the patient with the resident / non-physician and agree with the resident's / non-physician practitioner's findings and plan as documented in the resident's / non-physician practicitioner's note, except where noted  At this point, I agree with the current assessment done in the ED  I was present during key portions of all procedures performed unless otherwise stated  Chief Complaint   Patient presents with    Spasms     Pt reports b/l leg pain and spasms since this AM; reports no relief with current medication     This is a 67 y o  female presenting for evaluation of spasms  She feels cramping and spasticity in the legs, hasn't had her Requip this evening  Worsening throughout the day  No f/ch/n/v/cps/ob  Only the thighs/legs are involved  No meds trialed  PMH:  RLS   has a past medical history of Acute blood loss anemia (9/9/2020), Aftercare following joint replacement (9/9/2020), Anxiety, Arthritis, Depression, Disease of thyroid gland, Femur neck fracture ([address] ), GERD (gastroesophageal reflux disease), Hyperthyroidism, Osteoporosis, Polio, PVD (peripheral vascular disease) ([address] ), Right BBB/left ant fasc block, UTI (urinary tract infection), and Varicella infection  PSH:   has a past surgical history that includes Hip surgery; Joint replacement (Right); pr conv prev hip surg to tot hip arthroplas (Left, 10/4/2017); Revision total hip arthroplasty (Right); Appendectomy; Tonsillectomy; Hip Arthroplasty (Left, 11/27/2017); Hip fracture surgery; pr reconstr total shoulder implant (Right, 9/2/2020); pr reconstr total shoulder implant (Left, 6/1/2021); and pr wrist arthroscop,release xvers lig (Right, 5/24/2022)      Social:  Social History     Substance and Sexual Activity   Alcohol Use Not Currently     Social History     Tobacco Use   Smoking Status Former Smoker   Smokeless Tobacco Never Used   Tobacco Comment    quit 20-30 years ago     Social History     Substance and Sexual Activity   Drug Use Not Currently     PE:  Vitals:    06/09/22 2130 06/09/22 2200 06/10/22 0000 06/10/22 0500   BP: 146/94 (!) 159/110 155/78    Pulse: (!) 108 (!) 110 90 68   Resp: 20      Temp:       TempSrc:       SpO2: 97% 96% 96% 100%   General: VSS, NAD, awake, alert  Well-nourished, well-developed  Appears stated age  Head: Normocephalic, atraumatic, nontender  Eyes: PERRL, EOM-I  No diplopia  No hyphema  No subconjunctival hemorrhages  Symmetrical lids  ENTAtraumatic external nose and ears  MMM  No stridor  Normal phonation  No drooling  Base of mouth is soft  No mastoid tenderness  Neck: Symmetric, trachea midline  No JVD  CV: Peripheral pulses +2 throughout  No chest wall tenderness  Lungs:   Unlabored   No retractions  No crepitus  No tachypnea  No paradoxical motion  Abd: +BS, soft, NT/ND    MSK:   FROM   No lower extremity edema  Keeps moving her legs  Back:   No CVAT  Skin: Dry, intact  Neuro: AAOx3, GCS 15, CN II-XII grossly intact  Motor grossly intact  Psychiatric/Behavioral: Appropriate mood and affect   Exam: deferred  A:  - RLS? Spasms? P:  - K+  - Requip  - Re-evaluate  - Dispo    - 13 point ROS was performed and all are normal unless stated in the history above  - Nursing note reviewed  Vitals reviewed  - Orders placed by myself and/or advanced practitioner / resident     - Previous chart was reviewed  - No language barrier    - History obtained from patient  - There are no limitations to the history obtained  - Critical care time: Not applicable for this patient       Code Status: Prior  Advance Directive and Living Will:      Power of :    POLST:      Medications   rOPINIRole (REQUIP) tablet 2 mg (2 mg Oral Given 6/9/22 2210)   ketorolac (TORADOL) injection 15 mg (15 mg Intravenous Given 6/9/22 2123)   acetaminophen (TYLENOL) tablet 975 mg (975 mg Oral Given 6/9/22 2124)   potassium chloride (K-DUR,KLOR-CON) CR tablet 40 mEq (40 mEq Oral Given 6/10/22 0045)   diazepam (VALIUM) tablet 2 mg (2 mg Oral Given 6/10/22 0055)     No orders to display     Orders Placed This Encounter   Procedures    Basic metabolic panel     Labs Reviewed   BASIC METABOLIC PANEL - Abnormal       Result Value Ref Range Status    Sodium 138  136 - 145 mmol/L Final    Potassium 3 6  3 5 - 5 3 mmol/L Final    Chloride 107  100 - 108 mmol/L Final    CO2 24  21 - 32 mmol/L Final    ANION GAP 7  4 - 13 mmol/L Final    BUN 9  5 - 25 mg/dL Final    Creatinine 0 56 (*) 0 60 - 1 30 mg/dL Final    Comment: Standardized to IDMS reference method    Glucose 119  65 - 140 mg/dL Final    Comment: If the patient is fasting, the ADA then defines impaired fasting glucose as > 100 mg/dL and diabetes as > or equal to 123 mg/dL  Specimen collection should occur prior to Sulfasalazine administration due to the potential for falsely depressed results  Specimen collection should occur prior to Sulfapyridine administration due to the potential for falsely elevated results      Calcium 8 5  8 3 - 10 1 mg/dL Final    eGFR 90  ml/min/1 73sq m Final    Narrative:     [place] guidelines for Chronic Kidney Disease (CKD):     Stage 1 with normal or high GFR (GFR > 90 mL/min/1 73 square meters)    Stage 2 Mild CKD (GFR = 60-89 mL/min/1 73 square meters)    Stage 3A Moderate CKD (GFR = 45-59 mL/min/1 73 square meters)    Stage 3B Moderate CKD (GFR = 30-44 mL/min/1 73 square meters)    Stage 4 Severe CKD (GFR = 15-29 mL/min/1 73 square meters)    Stage 5 End Stage CKD (GFR <15 mL/min/1 73 square meters)  Note: GFR calculation is accurate only with a steady state creatinine     Time reflects when diagnosis was documented in both MDM as applicable and the Disposition within this note       Time User Action Codes Description Comment    6/9/2022 11:02 PM Renee Sam Add [O92 020] Muscle spasm     6/9/2022 11:02 PM Renee Sam Add [G25 81] Restless leg syndrome     6/9/2022 11:02 PM Renee Sam Modify [F60 785] Muscle spasm     6/9/2022 11:02 PM Renee Sam Modify [G25 81] Restless leg syndrome           ED Disposition       ED Disposition   Discharge    Condition   Stable    Date/Time   Thu Jun 9, 2022 10:58 PM    Comment   Geetha Miguel discharge to home/self care  Follow-up Information    None       Discharge Medication List as of 6/10/2022  7:15 AM        CONTINUE these medications which have NOT CHANGED    Details   acetaminophen (TYLENOL) 650 mg CR tablet Take 1 tablet (650 mg total) by mouth every 8 (eight) hours as needed for mild pain, Starting Tue 5/24/2022, Normal      Ascorbic Acid, Vitamin C, (VITAMIN C) 100 MG tablet Take 400 mg by mouth daily, Historical Med      ferrous gluconate (FERGON) 240 (27 FE) MG tablet Take 1 tablet (240 mg total) by mouth in the morning, Starting Tue 6/01/7173, Normal      folic acid (FOLVITE) 1 mg tablet Take 2,000 mcg by mouth daily, Starting u 2/24/2022, Historical Med      hydroxychloroquine (PLAQUENIL) 200 mg tablet 1 pill am 1/2 pill pm, Starting Sat 4/4/2020, Historical Med      levothyroxine 25 mcg tablet Take 1 tablet (25 mcg total) by mouth daily, Starting Mon 11/29/2021, Normal      methocarbamol (ROBAXIN) 750 mg tablet Take 1 tablet (750 mg total) by mouth 2 (two) times a day as needed for muscle spasms, Starting Mon 5/2/2022, Normal      methotrexate 2 5 MG tablet 3 tablets by mouth once a week on Thursday, No Print      Multiple Vitamins-Minerals (CENTRUM SILVER PO) Take 1 tablet by mouth daily, Starting Tue 12/13/2016, Historical Med      naproxen sodium (ALEVE) 220 MG tablet Take 1 tablet (220 mg total) by mouth in the morning and 1 tablet (220 mg total) in the evening  Take with meals  , Starting 2022, Normal      nortriptyline (PAMELOR) 50 mg capsule Take 2 capsules (100 mg total) by mouth daily at bedtime, Starting 2022, Normal      predniSONE 5 mg tablet Take 5 mg by mouth daily, Starting Mon 2020, Historical Med      rOPINIRole (REQUIP) 2 mg tablet Take 1 tablet (2 mg total) by mouth daily at bedtime Take 1 tablet by mouth daily at bedtime, Starting 2022, Normal           No discharge procedures on file  Prior to Admission Medications   Prescriptions Last Dose Informant Patient Reported? Taking? Ascorbic Acid, Vitamin C, (VITAMIN C) 100 MG tablet   Yes No   Sig: Take 400 mg by mouth daily   Multiple Vitamins-Minerals (CENTRUM SILVER PO)  Self Yes No   Sig: Take 1 tablet by mouth daily   acetaminophen (TYLENOL) 650 mg CR tablet   No No   Sig: Take 1 tablet (650 mg total) by mouth every 8 (eight) hours as needed for mild pain   ferrous gluconate (FERGON) 240 (27 FE) MG tablet   No No   Sig: Take 1 tablet (240 mg total) by mouth in the morning   folic acid (FOLVITE) 1 mg tablet   Yes No   Sig: Take 2,000 mcg by mouth daily   hydroxychloroquine (PLAQUENIL) 200 mg tablet  Self Yes No   Si pill am 1/2 pill pm   levothyroxine 25 mcg tablet   No No   Sig: Take 1 tablet (25 mcg total) by mouth daily   methocarbamol (ROBAXIN) 750 mg tablet   No No   Sig: Take 1 tablet (750 mg total) by mouth 2 (two) times a day as needed for muscle spasms   methotrexate 2 5 MG tablet  Self No No   Sig: 3 tablets by mouth once a week on Thursday   naproxen sodium (ALEVE) 220 MG tablet   No No   Sig: Take 1 tablet (220 mg total) by mouth in the morning and 1 tablet (220 mg total) in the evening  Take with meals     nortriptyline (PAMELOR) 50 mg capsule   No No   Sig: Take 2 capsules (100 mg total) by mouth daily at bedtime   predniSONE 5 mg tablet  Self Yes No   Sig: Take 5 mg by mouth daily   rOPINIRole (REQUIP) 2 mg tablet   No No   Sig: Take 1 tablet (2 mg total) by mouth daily at bedtime Take 1 tablet by mouth daily at bedtime      Facility-Administered Medications: None       Portions of the record may have been created with voice recognition software  Occasional wrong word or "sound a like" substitutions may have occurred due to the inherent limitations of voice recognition software  Read the chart carefully and recognize, using context, where substitutions have occurred      Electronically signed by:  Kenya Krishna

## 2022-06-10 NOTE — ED PROVIDER NOTES
History  Chief Complaint   Patient presents with    Spasms     Pt reports b/l leg pain and spasms since this AM; reports no relief with current medication     48-year-old female history of polio and restless legs presenting with leg pain and discomfort  Patient reports feeling like she has to move her legs and cant keep them still  States it feels like her normal restless legs  Has not taken her Requip today  Has not tried any other medications  Denies any other complaints  Prior to Admission Medications   Prescriptions Last Dose Informant Patient Reported? Taking? Ascorbic Acid, Vitamin C, (VITAMIN C) 100 MG tablet   Yes No   Sig: Take 400 mg by mouth daily   Multiple Vitamins-Minerals (CENTRUM SILVER PO)  Self Yes No   Sig: Take 1 tablet by mouth daily   acetaminophen (TYLENOL) 650 mg CR tablet   No No   Sig: Take 1 tablet (650 mg total) by mouth every 8 (eight) hours as needed for mild pain   ferrous gluconate (FERGON) 240 (27 FE) MG tablet   No No   Sig: Take 1 tablet (240 mg total) by mouth in the morning   folic acid (FOLVITE) 1 mg tablet   Yes No   Sig: Take 2,000 mcg by mouth daily   hydroxychloroquine (PLAQUENIL) 200 mg tablet  Self Yes No   Si pill am 1/2 pill pm   levothyroxine 25 mcg tablet   No No   Sig: Take 1 tablet (25 mcg total) by mouth daily   methocarbamol (ROBAXIN) 750 mg tablet   No No   Sig: Take 1 tablet (750 mg total) by mouth 2 (two) times a day as needed for muscle spasms   methotrexate 2 5 MG tablet  Self No No   Sig: 3 tablets by mouth once a week on Thursday   naproxen sodium (ALEVE) 220 MG tablet   No No   Sig: Take 1 tablet (220 mg total) by mouth in the morning and 1 tablet (220 mg total) in the evening  Take with meals     nortriptyline (PAMELOR) 50 mg capsule   No No   Sig: Take 2 capsules (100 mg total) by mouth daily at bedtime   predniSONE 5 mg tablet  Self Yes No   Sig: Take 5 mg by mouth daily   rOPINIRole (REQUIP) 2 mg tablet   No No   Sig: Take 1 tablet (2 mg total) by mouth daily at bedtime Take 1 tablet by mouth daily at bedtime      Facility-Administered Medications: None       Past Medical History:   Diagnosis Date    Acute blood loss anemia 9/9/2020    Aftercare following joint replacement 9/9/2020    Patient had right reversed total shoulder arthroplasty   Pain was controlled when he left the hospital       Anxiety     Arthritis     Depression     Disease of thyroid gland     HYPO    Femur neck fracture (HCC)     GERD (gastroesophageal reflux disease)     Hyperthyroidism     RESOLVED: [id number]    Osteoporosis     Polio     PVD (peripheral vascular disease) ([facility] [address] )     Right BBB/left ant fasc block     UTI (urinary tract infection)     Varicella infection     LAST ASSESSED: [id number]       Past Surgical History:   Procedure Laterality Date    APPENDECTOMY      HIP ARTHROPLASTY Left 11/27/2017    Procedure: REMOVAL IM NAIL CONVERSION TO TOTAL HIP ARTHROPLASTY POSTERIOR APPROACH;  Surgeon: Inez Krause MD;  [facility];  Service: Orthopedics    HIP FRACTURE SURGERY      HIP SURGERY      both hips replaced;2013 left ,2014 right    JOINT REPLACEMENT Right     HIP TOTAL    ND CONV PREV HIP SURG TO TOT HIP Alex Randle Left 10/4/2017    Procedure: Aleyda Spore removal of left hip;  Surgeon: Inez Krause MD;  [facility];  Service: Orthopedics    ND RECONSTR TOTAL SHOULDER IMPLANT Right 9/2/2020    Procedure: ARTHROPLASTY SHOULDER REVERSE;  Surgeon: Jorge Roper MD;  [facility];  Service: Orthopedics    ND RECONSTR TOTAL SHOULDER IMPLANT Left 6/1/2021    Procedure: ARTHROPLASTY SHOULDER REVERSE;  Surgeon: Jorge Roper MD;  [facility];  Service: Orthopedics    ND WRIST [facility] LIG Right 5/24/2022    Procedure: RELEASE CARPAL TUNNEL ENDOSCOPIC-right;  Surgeon: Ha Walker MD;  [facility];  Service: Orthopedics    REVISION TOTAL HIP ARTHROPLASTY Right     TONSILLECTOMY         Family History   Problem Relation Age of Onset    Rheum arthritis Mother     Rheum arthritis Sister     Prostate cancer Brother      I have reviewed and agree with the history as documented  E-Cigarette/Vaping    E-Cigarette Use Never User      E-Cigarette/Vaping Substances    Nicotine No     THC No     CBD No     Flavoring No     Other No     Unknown No      Social History     Tobacco Use    Smoking status: Former Smoker    Smokeless tobacco: Never Used    Tobacco comment: quit 20-30 years ago   Vaping Use    Vaping Use: Never used   Substance Use Topics    Alcohol use: Not Currently    Drug use: Not Currently        Review of Systems   Constitutional: Negative for appetite change, chills, diaphoresis, fever and unexpected weight change  HENT: Negative for congestion and rhinorrhea  Eyes: Negative for photophobia and visual disturbance  Respiratory: Negative for cough, chest tightness and shortness of breath  Cardiovascular: Negative for chest pain, palpitations and leg swelling  Gastrointestinal: Negative for abdominal distention, abdominal pain, blood in stool, constipation, diarrhea, nausea and vomiting  Genitourinary: Negative for dysuria and hematuria  Musculoskeletal: Positive for myalgias  Negative for back pain, joint swelling, neck pain and neck stiffness  Skin: Negative for color change, pallor, rash and wound  Neurological: Negative for dizziness, syncope, weakness, light-headedness and headaches  Psychiatric/Behavioral: Negative for agitation  All other systems reviewed and are negative        Physical Exam  ED Triage Vitals [06/09/22 2044]   Temperature Pulse Respirations Blood Pressure SpO2   (!) 97 2 °F (36 2 °C) (!) 116 20 (!) 162/103 95 %      Temp Source Heart Rate Source Patient Position - Orthostatic VS BP Location FiO2 (%)   Oral -- -- -- --      Pain Score       10 - Worst Possible Pain             Orthostatic Vital Signs  Vitals: 06/09/22 2130 06/09/22 2200 06/10/22 0000 06/10/22 0500   BP: 146/94 (!) 159/110 155/78    Pulse: (!) 108 (!) 110 90 68       Physical Exam  Vitals and nursing note reviewed  Constitutional:       General: She is not in acute distress  Appearance: Normal appearance  She is well-developed  She is not ill-appearing, toxic-appearing or diaphoretic  HENT:      Head: Normocephalic and atraumatic  Nose: Nose normal  No congestion or rhinorrhea  Mouth/Throat:      Mouth: Mucous membranes are moist       Pharynx: Oropharynx is clear  No oropharyngeal exudate or posterior oropharyngeal erythema  Eyes:      General: No scleral icterus  Right eye: No discharge  Left eye: No discharge  Extraocular Movements: Extraocular movements intact  Conjunctiva/sclera: Conjunctivae normal       Pupils: Pupils are equal, round, and reactive to light  Neck:      Vascular: No JVD  Trachea: No tracheal deviation  Cardiovascular:      Rate and Rhythm: Normal rate and regular rhythm  Heart sounds: Normal heart sounds  No murmur heard  No friction rub  No gallop  Comments: Normal rate and regular rhythm  Pulmonary:      Effort: Pulmonary effort is normal  No respiratory distress  Breath sounds: Normal breath sounds  No stridor  No wheezing or rales  Comments: Clear to auscultation bilaterally  Chest:      Chest wall: No tenderness  Abdominal:      General: Bowel sounds are normal  There is no distension  Palpations: Abdomen is soft  Tenderness: There is no abdominal tenderness  There is no right CVA tenderness, left CVA tenderness, guarding or rebound  Comments: Soft, nontender, nondistended  Normal bowel sounds throughout   Musculoskeletal:         General: No swelling, tenderness, deformity or signs of injury  Normal range of motion  Cervical back: Normal range of motion and neck supple  No rigidity  No muscular tenderness        Right lower leg: No edema  Left lower leg: No edema  Comments: Continues to move legs, unable to keep them still  No external signs of trauma or tenderness   Lymphadenopathy:      Cervical: No cervical adenopathy  Skin:     General: Skin is warm and dry  Coloration: Skin is not pale  Findings: No erythema or rash  Neurological:      General: No focal deficit present  Mental Status: She is alert and oriented to person, place, and time  Mental status is at baseline  Cranial Nerves: No cranial nerve deficit  Sensory: No sensory deficit  Motor: No weakness or abnormal muscle tone  Coordination: Coordination normal       Gait: Gait normal       Comments: A&Ox3 to person, place, and time  CN 2-12 intact  Strength 5/5 throughout  Sensation grossly intact  Psychiatric:         Behavior: Behavior normal          Thought Content:  Thought content normal          ED Medications  Medications   rOPINIRole (REQUIP) tablet 2 mg (2 mg Oral Given 6/9/22 2210)   ketorolac (TORADOL) injection 15 mg (15 mg Intravenous Given 6/9/22 2123)   acetaminophen (TYLENOL) tablet 975 mg (975 mg Oral Given 6/9/22 2124)   potassium chloride (K-DUR,KLOR-CON) CR tablet 40 mEq (40 mEq Oral Given 6/10/22 0045)   diazepam (VALIUM) tablet 2 mg (2 mg Oral Given 6/10/22 0055)       Diagnostic Studies  Results Reviewed     Procedure Component Value Units Date/Time    Basic metabolic panel [[id number]]  (Abnormal) Collected: 06/09/22 2121    Lab Status: Final result Specimen: Blood from Arm, Left Updated: 06/09/22 2224     Sodium 138 mmol/L      Potassium 3 6 mmol/L      Chloride 107 mmol/L      CO2 24 mmol/L      ANION GAP 7 mmol/L      BUN 9 mg/dL      Creatinine 0 56 mg/dL      Glucose 119 mg/dL      Calcium 8 5 mg/dL      eGFR 90 ml/min/1 73sq m     Narrative:      [place] guidelines for Chronic Kidney Disease (CKD):     Stage 1 with normal or high GFR (GFR > 90 mL/min/1 73 square meters)    Stage 2 Mild CKD (GFR = 60-89 mL/min/1 73 square meters)    Stage 3A Moderate CKD (GFR = 45-59 mL/min/1 73 square meters)    Stage 3B Moderate CKD (GFR = 30-44 mL/min/1 73 square meters)    Stage 4 Severe CKD (GFR = 15-29 mL/min/1 73 square meters)    Stage 5 End Stage CKD (GFR <15 mL/min/1 73 square meters)  Note: GFR calculation is accurate only with a steady state creatinine                 No orders to display         Procedures  Procedures      ED Course                             SBIRT 22yo+    Flowsheet Row Most Recent Value   SBIRT (25 yo +)    In order to provide better care to our patients, we are screening all of our patients for alcohol and drug use  Would it be okay to ask you these screening questions? No Filed at: 06/09/2022 2200                MDM  Number of Diagnoses or Management Options  Muscle spasm  Restless leg syndrome  Diagnosis management comments: 14-year-old female history of polio and restless legs presenting with leg pain and discomfort  States it feels like her normal restless legs and denies any injury  Has not taken her medication  Plan for symptom management with oral and IV pain medication  Will assess BMP to assess electrolytes  Reassess  BMP notable for potassium borderline at 3 6  Repleted oral potassium  Symptoms somewhat improved after medication  Given additional medication with improvement  Discussed results and recommendations  Advised follow-up primary care provider  Medication recommendations  Given instructions and return precautions  All questions answered  Patient acknowledged understanding of all written and verbal instructions and return precautions  Discharge         Amount and/or Complexity of Data Reviewed  Clinical lab tests: reviewed and ordered  Tests in the radiology section of CPT®: reviewed  Tests in the medicine section of CPT®: reviewed and ordered  Review and summarize past medical records: yes  Discuss the patient with other providers: yes  Independent visualization of images, tracings, or specimens: yes    Risk of Complications, Morbidity, and/or Mortality  Presenting problems: moderate  Diagnostic procedures: moderate  Management options: moderate    Patient Progress  Patient progress: improved      Disposition  Final diagnoses:   Muscle spasm   Restless leg syndrome     Time reflects when diagnosis was documented in both MDM as applicable and the Disposition within this note     Time User Action Codes Description Comment    6/9/2022 11:02 PM [place] Add [B83 372] Muscle spasm     6/9/2022 11:02 PM [place] Add [G25 81] Restless leg syndrome     6/9/2022 11:02 PM [place] Modify [C91 946] Muscle spasm     6/9/2022 11:02 PM [place] Modify [G25 81] Restless leg syndrome       ED Disposition     ED Disposition   Discharge    Condition   Stable    Date/Time   Thu Jun 9, 2022 10:58 PM    Comment   Geetha Rivera discharge to home/self care                 Follow-up Information    None         Discharge Medication List as of 6/10/2022  7:15 AM      CONTINUE these medications which have NOT CHANGED    Details   acetaminophen (TYLENOL) 650 mg CR tablet Take 1 tablet (650 mg total) by mouth every 8 (eight) hours as needed for mild pain, Starting Tue 5/24/2022, Normal      Ascorbic Acid, Vitamin C, (VITAMIN C) 100 MG tablet Take 400 mg by mouth daily, Historical Med      ferrous gluconate (FERGON) 240 (27 FE) MG tablet Take 1 tablet (240 mg total) by mouth in the morning, Starting Tue 7/43/0843, Normal      folic acid (FOLVITE) 1 mg tablet Take 2,000 mcg by mouth daily, Starting Thu 2/24/2022, Historical Med      hydroxychloroquine (PLAQUENIL) 200 mg tablet 1 pill am 1/2 pill pm, Starting Sat 4/4/2020, Historical Med      levothyroxine 25 mcg tablet Take 1 tablet (25 mcg total) by mouth daily, Starting Mon 11/29/2021, Normal      methocarbamol (ROBAXIN) 750 mg tablet Take 1 tablet (750 mg total) by mouth 2 (two) times a day as needed for muscle spasms, Starting Mon 5/2/2022, Normal      methotrexate 2 5 MG tablet 3 tablets by mouth once a week on Thursday, No Print      Multiple Vitamins-Minerals (CENTRUM SILVER PO) Take 1 tablet by mouth daily, Starting Tue 12/13/2016, Historical Med      naproxen sodium (ALEVE) 220 MG tablet Take 1 tablet (220 mg total) by mouth in the morning and 1 tablet (220 mg total) in the evening  Take with meals  , Starting Tue 5/24/2022, Normal      nortriptyline (PAMELOR) 50 mg capsule Take 2 capsules (100 mg total) by mouth daily at bedtime, Starting Fri 2/11/2022, Normal      predniSONE 5 mg tablet Take 5 mg by mouth daily, Starting Mon 9/28/2020, Historical Med      rOPINIRole (REQUIP) 2 mg tablet Take 1 tablet (2 mg total) by mouth daily at bedtime Take 1 tablet by mouth daily at bedtime, Starting Tue 1/25/2022, Normal           No discharge procedures on file  PDMP Review       Value Time User    PDMP Reviewed  Yes 5/24/2022  8:41 AM Elias Nichole PA-C           ED Provider  Attending physically available and evaluated Geetha Miguel FRAZIER managed the patient along with the ED Attending      Electronically Signed by         Graciela Benitez MD  06/10/22 0258

## 2022-06-10 NOTE — DISCHARGE INSTRUCTIONS
Please follow-up primary care provider  Recommend Tylenol 650 mg and ibuprofen 600 mg  Please take your Requip as prescribed  Please refer to the following documents for additional instructions and return precautions

## 2022-06-14 ENCOUNTER — OFFICE VISIT (OUTPATIENT)
Dept: WOUND CARE | Facility: HOSPITAL | Age: 77
End: 2022-06-14
Payer: MEDICARE

## 2022-06-14 ENCOUNTER — HOSPITAL ENCOUNTER (OUTPATIENT)
Dept: RADIOLOGY | Facility: HOSPITAL | Age: 77
Discharge: HOME | End: 2022-06-14
Attending: FAMILY MEDICINE
Payer: MEDICARE

## 2022-06-14 VITALS
WEIGHT: 98 LBS | HEART RATE: 92 BPM | SYSTOLIC BLOOD PRESSURE: 125 MMHG | RESPIRATION RATE: 16 BRPM | DIASTOLIC BLOOD PRESSURE: 55 MMHG | HEIGHT: 58 IN | BODY MASS INDEX: 20.57 KG/M2 | TEMPERATURE: 98.6 F

## 2022-06-14 DIAGNOSIS — L89.893 PRESSURE INJURY OF LEFT FOOT, STAGE 3 (HCC): ICD-10-CM

## 2022-06-14 DIAGNOSIS — L89.893 PRESSURE INJURY OF LEFT FOOT, STAGE 3 (HCC): Primary | ICD-10-CM

## 2022-06-14 PROCEDURE — 73630 X-RAY EXAM OF FOOT: CPT

## 2022-06-14 PROCEDURE — 99213 OFFICE O/P EST LOW 20 MIN: CPT | Performed by: FAMILY MEDICINE

## 2022-06-14 PROCEDURE — 97597 DBRDMT OPN WND 1ST 20 CM/<: CPT | Performed by: FAMILY MEDICINE

## 2022-06-14 PROCEDURE — 99204 OFFICE O/P NEW MOD 45 MIN: CPT | Performed by: FAMILY MEDICINE

## 2022-06-14 RX ORDER — LIDOCAINE 40 MG/G
CREAM TOPICAL ONCE
Status: COMPLETED | OUTPATIENT
Start: 2022-06-14 | End: 2022-06-14

## 2022-06-14 RX ADMIN — LIDOCAINE: 40 CREAM TOPICAL at 13:35

## 2022-06-14 NOTE — PATIENT INSTRUCTIONS
Orders Placed This Encounter   Procedures    Wound cleansing and dressings     Left foot wound:    May shower with protection or sponge bath at this time     Cleanse with mild soap and water   Pat dry  Apply silver alginate to wound  Cover with silicone bordered foam dressing  Change dressing 3 times a week and as needed for excessive drainage/dislodgement     This was performed at wound care center today        As discussed Stop antibiotics     Standing Status:   Future     Standing Expiration Date:   6/14/2023

## 2022-06-14 NOTE — PROGRESS NOTES
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  This is a chronic, recurring problem that possibly started out as either callus or ulceration  The patient has history of polio with a shorten left leg admits to walking on the lateral side of her foot  She only wears open toe shoes   Selective debridement done today   X-ray ordered to rule out underlying problems   Silver alginate and bordered foam 3 times a week   Will refer to Dr Dario Arredondo for further evaluation from a podiatry perspective for offloading and footwear  Subjective:   6/14/22:1st visit for this 70-year-old female who comes to [facility] for evaluation of left foot wound  The patient has a history of polio as a child with her left foot being shorter than the right  Patient states that she tends to walk on the outer aspect of her left foot and gets either corn or callus in the area  She admits to picking at frequently  However this time over the past month or so, she developed an ulceration and is not healing  She does have pain in this area  There is no history of claudication  No fever or chills  Proximally two weeks ago, she developed significant edema of both lower extremities with subsequent development of blisters  They opened and with elevation the swelling went down  All the wounds have closed from this  She went to her primary care who prescribed Augmentin but the patient could not take it because of severe diarrhea      Past medical history is remarkable for lupus and rheumatoid arthritis  The following portions of the patient's history were reviewed and updated as appropriate:   Patient Active Problem List   Diagnosis    Anxiety    RLS (restless legs syndrome)    S/P total hip arthroplasty    Hypothyroidism due to Hashimoto's thyroiditis    Age-related osteoporosis without current pathological fracture    RBBB    Ambulatory dysfunction    Closed compression fracture of L3 lumbar vertebra    Rheumatoid arthritis involving multiple sites with positive rheumatoid factor (HCC)    Chronic pain syndrome    Vitamin D deficiency    Dyspepsia    Cellulitis of left lower extremity    Other forms of systemic lupus erythematosus ([address] )    Depression    Acquired subluxation of left shoulder    Rupture long head biceps tendon, left, initial encounter    S/P reverse total shoulder arthroplasty, left    Encounter for annual wellness exam in Medicare patient    Shoulder pain, bilateral    Failed orthopedic implant [facility] [address] [facility]    Venous insufficiency    Cellulitis of right lower extremity     Past Medical History:   Diagnosis Date    Acute blood loss anemia 9/9/2020    Aftercare following joint replacement 9/9/2020    Patient had right reversed total shoulder arthroplasty   Pain was controlled when he left the hospital       Anxiety     Arthritis     Depression     Disease of thyroid gland     HYPO    Femur neck fracture (HCC)     GERD (gastroesophageal reflux disease)     Hyperthyroidism     RESOLVED: [id number]    Osteoporosis     Polio     PVD (peripheral vascular disease) ([address] )     Right BBB/left ant fasc block     UTI (urinary tract infection)     Varicella infection     LAST ASSESSED: [id number]     Past Surgical History:   Procedure Laterality Date    APPENDECTOMY      HIP ARTHROPLASTY Left 11/27/2017    Procedure: REMOVAL IM NAIL CONVERSION TO TOTAL HIP ARTHROPLASTY POSTERIOR APPROACH;  Surgeon: Estela Puentes MD;  [facility]; Service: Orthopedics    HIP FRACTURE SURGERY      HIP SURGERY      both hips replaced;2013 left ,2014 right    JOINT REPLACEMENT Right     HIP TOTAL    AL CONV PREV HIP SURG TO TOT HIP Carina Lujan Left 10/4/2017    Procedure: [place] removal of left hip;  Surgeon: Raulito Martinez MD;  [facility];  Service: Orthopedics    AL RECONSTR TOTAL SHOULDER IMPLANT Right 9/2/2020    Procedure: ARTHROPLASTY SHOULDER REVERSE;  Surgeon: Richard Aaron MD;  [facility];  Service: Orthopedics    AL RECONSTR TOTAL SHOULDER IMPLANT Left 6/1/2021    Procedure: ARTHROPLASTY SHOULDER REVERSE;  Surgeon: Richard Aaron MD;  [facility];  Service: Orthopedics    AL WRIST Mateo Correia LIG Right 5/24/2022    Procedure: RELEASE CARPAL TUNNEL ENDOSCOPIC-right;  Surgeon: William Thomas MD;  [facility];  Service: Orthopedics    REVISION TOTAL HIP ARTHROPLASTY Right     TONSILLECTOMY       Family History   Problem Relation Age of Onset    Rheum arthritis Mother     Rheum arthritis Sister     Prostate cancer Brother       Social History     Socioeconomic History    Marital status:      Spouse name: None    Number of children: 1    Years of education: None    Highest education level: None   Occupational History    Occupation: RETIRED    Tobacco Use    Smoking status: Former Smoker    Smokeless tobacco: Never Used    Tobacco comment: quit 20-30 years ago   Vaping Use    Vaping Use: Never used   Substance and Sexual Activity    Alcohol use: Not Currently    Drug use: Not Currently    Sexual activity: Not Currently   Other Topics Concern    None   Social History Narrative    Always uses seat belt    Caffeine use    Christianity    Single as per all scripts      Social Determinants of Health     Financial Resource Strain: Not on file   Food Insecurity: Not on file   Transportation Needs: Not on file   Physical Activity: Not on file   Stress: Not on file Social Connections: Not on file   Intimate Partner Violence: Not on file   Housing Stability: Not on file        Current Outpatient Medications:     acetaminophen (TYLENOL) 650 mg CR tablet, Take 1 tablet (650 mg total) by mouth every 8 (eight) hours as needed for mild pain, Disp: 30 tablet, Rfl: 0    Ascorbic Acid, Vitamin C, (VITAMIN C) 100 MG tablet, Take 400 mg by mouth daily, Disp: , Rfl:     ferrous gluconate (FERGON) 240 (27 FE) MG tablet, Take 1 tablet (240 mg total) by mouth in the morning, Disp: 90 tablet, Rfl: 1    folic acid (FOLVITE) 1 mg tablet, Take 2,000 mcg by mouth daily, Disp: , Rfl:     hydroxychloroquine (PLAQUENIL) 200 mg tablet, 1 pill am 1/2 pill pm, Disp: , Rfl:     levothyroxine 25 mcg tablet, Take 1 tablet (25 mcg total) by mouth daily, Disp: 90 tablet, Rfl: 1    methocarbamol (ROBAXIN) 750 mg tablet, Take 1 tablet (750 mg total) by mouth 2 (two) times a day as needed for muscle spasms, Disp: 30 tablet, Rfl: 5    methotrexate 2 5 MG tablet, 3 tablets by mouth once a week on Thursday, Disp:  , Rfl:     Multiple Vitamins-Minerals (CENTRUM SILVER PO), Take 1 tablet by mouth daily, Disp: , Rfl:     naproxen sodium (ALEVE) 220 MG tablet, Take 1 tablet (220 mg total) by mouth in the morning and 1 tablet (220 mg total) in the evening  Take with meals  , Disp: 20 tablet, Rfl: 0    nortriptyline (PAMELOR) 50 mg capsule, Take 2 capsules (100 mg total) by mouth daily at bedtime, Disp: 90 capsule, Rfl: 1    predniSONE 5 mg tablet, Take 5 mg by mouth daily, Disp: , Rfl:     rOPINIRole (REQUIP) 2 mg tablet, Take 1 tablet (2 mg total) by mouth daily at bedtime Take 1 tablet by mouth daily at bedtime, Disp: 90 tablet, Rfl: 1  No current facility-administered medications for this visit  Review of Systems   Constitutional: Negative for appetite change, chills, fatigue, fever and unexpected weight change  HENT: Negative for congestion, hearing loss, postnasal drip and sinus pressure  Eyes: Negative for discharge and visual disturbance  Respiratory: Negative for cough and shortness of breath  Cardiovascular: Positive for leg swelling  Negative for chest pain and palpitations  Gastrointestinal: Positive for constipation  Negative for abdominal pain, blood in stool, diarrhea and nausea  Endocrine: Negative  Genitourinary: Negative for difficulty urinating, dysuria and urgency  Musculoskeletal: Positive for joint swelling  Negative for gait problem  Skin: Positive for wound (Left foot)  Negative for rash  Allergic/Immunologic: Negative  Neurological: Positive for weakness  Negative for dizziness, tremors, seizures, numbness and headaches  Hematological: Does not bruise/bleed easily  Psychiatric/Behavioral: Negative  Negative for dysphoric mood  The patient is not nervous/anxious  Objective:  /55   Pulse 92   Temp 98 6 °F (37 °C)   Resp 16   Ht 4' 10" (1 473 m)   Wt 44 5 kg (98 lb)   BMI 20 48 kg/m²         Physical Exam  Vitals and nursing note reviewed  Constitutional:       Appearance: Normal appearance  She is well-developed and underweight  HENT:      Head: Normocephalic and atraumatic  Right Ear: External ear normal       Left Ear: External ear normal    Eyes:      General: Lids are normal          Right eye: No discharge  Left eye: No discharge  Conjunctiva/sclera: Conjunctivae normal    Neck:      Vascular: No carotid bruit  Cardiovascular:      Rate and Rhythm: Normal rate and regular rhythm  Pulses:           Dorsalis pedis pulses are 1+ on the right side and 1+ on the left side  Posterior tibial pulses are 1+ on the right side and 1+ on the left side  Heart sounds: Normal heart sounds  No murmur heard  No friction rub  No gallop  Comments: ABIs done in the office today are normal  No significant edema of either leg today    Pulmonary:      Effort: Pulmonary effort is normal       Breath sounds: Normal breath sounds and air entry  Abdominal:      General: Abdomen is flat  Palpations: Abdomen is soft  There is no hepatomegaly or splenomegaly  Tenderness: There is no abdominal tenderness  There is no guarding or rebound  Musculoskeletal:      Cervical back: Neck supple  Right lower leg: No edema  Left lower leg: No edema  Feet:       Comments: Kyphosis   Feet:      Comments: Stage III pressure injury the plantar, 5th met head  Tender to palpation  Surrounding maceration  No purulence  No obvious wound infection at this time  ABIs of both lower extremities within normal limits  (1 0 & 1 09)  Skin:     General: Skin is warm and dry  Findings: Wound present  Neurological:      Mental Status: She is alert and oriented to person, place, and time  Gait: Gait is intact  Psychiatric:         Attention and Perception: Attention normal          Mood and Affect: Mood and affect normal          Speech: Speech normal          Behavior: Behavior is cooperative  Cognition and Memory: Cognition normal               Wound 06/14/22 Pressure Injury Foot Left;Plantar (Active)   Wound Image   06/14/22 1309   Wound Description Pink;Yellow;Slough 06/14/22 1310   Tiffani-wound Assessment Maceration;Fragile;Callus;Pink 06/14/22 1310   Wound Length (cm) 1 cm 06/14/22 1310   Wound Width (cm) 1 5 cm 06/14/22 1310   Wound Depth (cm) 1 cm 06/14/22 1310   Wound Surface Area (cm^2) 1 5 cm^2 06/14/22 1310   Wound Volume (cm^3) 1 5 cm^3 06/14/22 1310   Calculated Wound Volume (cm^3) 1 5 cm^3 06/14/22 1310   Drainage Amount Moderate 06/14/22 1310   Drainage Description Serosanguineous 06/14/22 1310   Non-staged Wound Description Full thickness 06/14/22 1310                            Debridement   Wound 06/14/22 Pressure Injury Foot Left;Plantar    Universal Protocol:  Consent: Verbal consent obtained  Written consent obtained    Consent given by: patient  Time out: Immediately prior to procedure a "time out" was called to verify the correct patient, procedure, equipment, support staff and site/side marked as required  Patient understanding: patient states understanding of the procedure being performed  Patient identity confirmed: verbally with patient      Performed by: physician  Debridement type: selective  Pain control: lidocaine 4%  Post-debridement measurements  Length (cm): 1 3  Width (cm): 1 6  Depth (cm): 0 8  Percent debrided: 100%  Surface Area (cm^2): 2 08  Area debrided (cm^2): 2 08  Volume (cm^3): 1 66  Tissue and other material debrided: Macerated skin  Devitalized tissue debrided: fibrin and Macerated skin  Instrument(s) utilized: curette, forceps and scissors  Bleeding: small  Hemostasis obtained with: pressure  Procedural pain (0-10): 0  Post-procedural pain: 0   Response to treatment: procedure was tolerated well                 Wound Instructions:  Orders Placed This Encounter   Procedures    Wound cleansing and dressings     Left foot wound:    May shower with protection or sponge bath at this time     Cleanse with mild soap and water  Pat dry  Apply silver alginate to wound  Cover with silicone bordered foam dressing  Change dressing 3 times a week and as needed for excessive drainage/dislodgement     This was performed at wound care center today        As discussed Stop antibiotics     Standing Status:   Future     Standing Expiration Date:   6/14/2023    Debridement     This order was created via procedure documentation    XR foot 3+ vw left     Chronic ulcer of the left plantar  Standing Status:   Future     Standing Expiration Date:   6/14/2026       Total time spent today:  25 minutes  This includes reviewing the patient's chart, pertinent physician records including primary care office visit on 5/26/22, laboratory data including CMP, CBC from 6/8/22   Kathleen Wheeler MD, CHT, CWS    Portions of the record may have been created with voice recognition software  Occasional wrong word or "sound alike" substitutions may have occurred due to the inherent limitations of voice recognition software  Read the chart carefully and recognize, using context, where substitutions have occurred

## 2022-06-15 ENCOUNTER — OFFICE VISIT (OUTPATIENT)
Dept: FAMILY MEDICINE CLINIC | Facility: CLINIC | Age: 77
End: 2022-06-15
Payer: MEDICARE

## 2022-06-15 VITALS
RESPIRATION RATE: 18 BRPM | HEART RATE: 90 BPM | HEIGHT: 58 IN | TEMPERATURE: 98 F | DIASTOLIC BLOOD PRESSURE: 62 MMHG | BODY MASS INDEX: 20.57 KG/M2 | WEIGHT: 98 LBS | OXYGEN SATURATION: 97 % | SYSTOLIC BLOOD PRESSURE: 118 MMHG

## 2022-06-15 DIAGNOSIS — M79.672 LEFT FOOT PAIN: ICD-10-CM

## 2022-06-15 DIAGNOSIS — L03.115 CELLULITIS OF RIGHT LOWER EXTREMITY: Primary | ICD-10-CM

## 2022-06-15 PROCEDURE — 99213 OFFICE O/P EST LOW 20 MIN: CPT | Performed by: FAMILY MEDICINE

## 2022-06-15 RX ORDER — MELOXICAM 7.5 MG/1
7.5 TABLET ORAL DAILY
Qty: 30 TABLET | Refills: 0 | Status: SHIPPED | OUTPATIENT
Start: 2022-06-15

## 2022-06-15 NOTE — ASSESSMENT & PLAN NOTE
Resolved  discontinue antibiotics  advised patient follow up with wound Care for further management    Follow-up as needed

## 2022-06-15 NOTE — PROGRESS NOTES
Assessment/Plan:    Cellulitis of right lower extremity  Resolved  discontinue antibiotics  advised patient follow up with wound Care for further management  Follow-up as needed    Left foot pain  Patient having left foot pain  will start patient on Mobic 7 5 mg daily as needed for pain  We increased to 2 tablets as needed  stop all other NSAID use     follow-up as needed or with PCP  Diagnoses and all orders for this visit:    Cellulitis of right lower extremity    Left foot pain  -     meloxicam (Mobic) 7 5 mg tablet; Take 1 tablet (7 5 mg total) by mouth daily        Subjective:   Chief Complaint   Patient presents with   [address] [place] [address]   Topic Date Due    Falls: Plan of Care  04/20/2022    Hepatitis C Screening  07/20/2022 (Originally 1945)    COVID-19 Vaccine (4 - Booster for Pfizer series) 07/16/2022    Fall Risk  05/02/2023    Medicare Annual Wellness Visit (AWV)  05/02/2023    BMI: Adult  06/15/2023    DTaP,Tdap,and Td Vaccines (4 - Td or Tdap) 12/26/2031    Osteoporosis Screening  Completed    Pneumococcal Vaccine: 65+ Years  Completed    Influenza Vaccine  Completed    HIB Vaccine  Aged Out    Hepatitis B Vaccine  Aged Out    IPV Vaccine  Aged Out    Hepatitis A Vaccine  Aged Out    Meningococcal ACWY Vaccine  Aged Out    HPV Vaccine  Aged Out        Patient ID: Jacqueline Hunt is a 67 y o  female  HPI    Patient presents in follow-up for bilateral lower extremity cellulitis  Recently seen by wound care, advise to stop antibiotic use  Did have debridement of wounds  Feeling better  Swelling is improved  Still has some pain in left foot where they debrided  Like some medication  denies any fevers, chills, nausea, vomiting, shortness of breath or chest pain  also has appointment to go see Podiatry the next visit      The following portions of the patient's history were reviewed and updated as appropriate: allergies, current medications, past family history, past medical history, past social history, past surgical history, and problem list     Review of Systems   Constitutional: Negative for chills and fever  Respiratory: Negative for cough and shortness of breath  Cardiovascular: Negative for chest pain and palpitations  Gastrointestinal: Negative for diarrhea, nausea and vomiting  Musculoskeletal: Negative for myalgias  Skin: Positive for wound  Neurological: Negative for dizziness and headaches  Objective:  /62 (BP Location: Left arm, Patient Position: Sitting, Cuff Size: Adult)   Pulse 90   Temp 98 °F (36 7 °C) (Tympanic)   Resp 18   Ht 4' 10" (1 473 m)   Wt 44 5 kg (98 lb)   SpO2 97%   BMI 20 48 kg/m²      Physical Exam  Vitals reviewed  Constitutional:       General: She is not in acute distress  Comments: Left foot is wrapped   Eyes:      Conjunctiva/sclera: Conjunctivae normal    Cardiovascular:      Rate and Rhythm: Normal rate and regular rhythm  Pulses: Normal pulses  Heart sounds: No murmur heard  Pulmonary:      Effort: Pulmonary effort is normal  No respiratory distress  Breath sounds: No wheezing, rhonchi or rales  Skin:     Comments: Swelling has improved  No longer erythematous  Nonpitting  No discharge or open ulcers noted  Left foot is wrapped   Neurological:      Mental Status: She is alert  This note has been constructed using a voice recognition system  There may be translation, syntax, or grammatical errors  If you have an questions, please contact the dictating provider

## 2022-06-15 NOTE — ASSESSMENT & PLAN NOTE
Patient having left foot pain  will start patient on Mobic 7 5 mg daily as needed for pain  We increased to 2 tablets as needed  stop all other NSAID use     follow-up as needed or with PCP

## 2022-06-20 ENCOUNTER — OFFICE VISIT (OUTPATIENT)
Dept: FAMILY MEDICINE CLINIC | Facility: CLINIC | Age: 77
End: 2022-06-20
Payer: MEDICARE

## 2022-06-20 VITALS
OXYGEN SATURATION: 98 % | RESPIRATION RATE: 18 BRPM | HEIGHT: 58 IN | WEIGHT: 97.6 LBS | HEART RATE: 89 BPM | TEMPERATURE: 98.7 F | DIASTOLIC BLOOD PRESSURE: 70 MMHG | BODY MASS INDEX: 20.49 KG/M2 | SYSTOLIC BLOOD PRESSURE: 140 MMHG

## 2022-06-20 DIAGNOSIS — D64.9 ANEMIA, UNSPECIFIED TYPE: ICD-10-CM

## 2022-06-20 DIAGNOSIS — E06.3 HYPOTHYROIDISM DUE TO HASHIMOTO'S THYROIDITIS: Primary | ICD-10-CM

## 2022-06-20 DIAGNOSIS — Z13.1 SCREENING FOR DIABETES MELLITUS (DM): ICD-10-CM

## 2022-06-20 DIAGNOSIS — M79.672 LEFT FOOT PAIN: ICD-10-CM

## 2022-06-20 DIAGNOSIS — E03.8 HYPOTHYROIDISM DUE TO HASHIMOTO'S THYROIDITIS: Primary | ICD-10-CM

## 2022-06-20 PROBLEM — L03.115 CELLULITIS OF RIGHT LOWER EXTREMITY: Status: RESOLVED | Noted: 2022-05-26 | Resolved: 2022-06-20

## 2022-06-20 PROBLEM — L03.116 CELLULITIS OF LEFT LOWER EXTREMITY: Status: RESOLVED | Noted: 2019-11-30 | Resolved: 2022-06-20

## 2022-06-20 PROCEDURE — 99213 OFFICE O/P EST LOW 20 MIN: CPT | Performed by: FAMILY MEDICINE

## 2022-06-20 NOTE — PROGRESS NOTES
Assessment/Plan:    Left foot pain  Advised to stop putting rubber bands on her foot  Continue to see wound care  Leg heaviness has resolved and she is back to her baseline ambulatory status  Diagnoses and all orders for this visit:    Hypothyroidism due to Hashimoto's thyroiditis  -     TSH, 3rd generation with Free T4 reflex; Future    Anemia, unspecified type  -     CBC and differential; Future  -     Iron Panel (Includes Ferritin, Iron Sat%, Iron, and TIBC); Future    Screening for diabetes mellitus (DM)  -     Basic metabolic panel; Future    Left foot pain        Subjective: leg heaviness     Patient ID: Anisa Barker is a 67 y o  female  HPI  Here for leg heaviness  The appointment was scheduled when symptoms were progressively worsening but have since improved  She usually comes into the office in our wheelchair because she is a slow walker  She uses a walker and a cane at home and does not want a script for a wheelchair due to a lack of storage room in her house  She is back to her baseline ambulatory status and denies leg heaviness at this time  She has an ulcer of the left foot and is going to wound care  She has the foot bandaged up and wrapped in duck take and multiple rubber bands  She states the rubber bands alleviate pain          The following portions of the patient's history were reviewed and updated as appropriate:   She   Patient Active Problem List    Diagnosis Date Noted    Left foot pain 06/15/2022    Venous insufficiency 11/17/2021    Shoulder pain, bilateral 07/27/2021    Failed orthopedic implant ([facility] [address] ) 07/27/2021    Encounter for annual wellness exam in Medicare patient 06/14/2021    S/P reverse total shoulder arthroplasty, left 06/03/2021    Rupture long head biceps tendon, left, initial encounter 05/13/2021    Acquired subluxation of left shoulder 04/19/2021    Depression 09/09/2020    Other forms of systemic lupus erythematosus ([facility] [address] ) 08/31/2020    Dyspepsia 11/12/2019    Vitamin D deficiency 07/24/2019    Chronic pain syndrome 04/18/2019    Rheumatoid arthritis involving multiple sites with positive rheumatoid factor ([facility] [address] ) 03/04/2019    Ambulatory dysfunction 11/13/2018    Closed compression fracture of L3 lumbar vertebra 11/13/2018    S/P total hip arthroplasty 11/27/2017    Anxiety 08/30/2017    RLS (restless legs syndrome) 08/30/2017    RBBB 08/23/2017    Age-related osteoporosis without current pathological fracture 04/18/2017    Hypothyroidism due to Hashimoto's thyroiditis 12/13/2016     She  has a past surgical history that includes Hip surgery; Joint replacement (Right); pr conv prev hip surg to tot hip arthroplas (Left, 10/4/2017); Revision total hip arthroplasty (Right); Appendectomy; Tonsillectomy; Hip Arthroplasty (Left, 11/27/2017); Hip fracture surgery; pr reconstr total shoulder implant (Right, 9/2/2020); pr reconstr total shoulder implant (Left, 6/1/2021); and pr wrist arthroscop,release xvers lig (Right, 5/24/2022)  Her family history includes Prostate cancer in her brother; Rheum arthritis in her mother and sister  She  reports that she has quit smoking  She has never used smokeless tobacco  She reports previous alcohol use  She reports previous drug use    Current Outpatient Medications   Medication Sig Dispense Refill    Ascorbic Acid, Vitamin C, (VITAMIN C) 100 MG tablet Take 400 mg by mouth daily      ferrous gluconate (FERGON) 240 (27 FE) MG tablet Take 1 tablet (240 mg total) by mouth in the morning 90 tablet 1    folic acid (FOLVITE) 1 mg tablet Take 2,000 mcg by mouth daily      hydroxychloroquine (PLAQUENIL) 200 mg tablet 1 pill am 1/2 pill pm      levothyroxine 25 mcg tablet Take 1 tablet (25 mcg total) by mouth daily 90 tablet 1    meloxicam (Mobic) 7 5 mg tablet Take 1 tablet (7 5 mg total) by mouth daily 30 tablet 0    methocarbamol (ROBAXIN) 750 mg tablet Take 1 tablet (750 mg total) by mouth 2 (two) times a day as needed for muscle spasms 30 tablet 5    methotrexate 2 5 MG tablet 3 tablets by mouth once a week on Thursday      Multiple Vitamins-Minerals (CENTRUM SILVER PO) Take 1 tablet by mouth daily      nortriptyline (PAMELOR) 50 mg capsule Take 2 capsules (100 mg total) by mouth daily at bedtime 90 capsule 1    predniSONE 5 mg tablet Take 5 mg by mouth daily      rOPINIRole (REQUIP) 2 mg tablet Take 1 tablet (2 mg total) by mouth daily at bedtime Take 1 tablet by mouth daily at bedtime 90 tablet 1    acetaminophen (TYLENOL) 650 mg CR tablet Take 1 tablet (650 mg total) by mouth every 8 (eight) hours as needed for mild pain (Patient not taking: No sig reported) 30 tablet 0     No current facility-administered medications for this visit       Review of Systems   Constitutional: Negative for fever and unexpected weight change  HENT: Negative for ear pain, sore throat and trouble swallowing  Eyes: Negative for pain and visual disturbance  Respiratory: Negative for cough, chest tightness, shortness of breath and wheezing  Cardiovascular: Negative for chest pain  Gastrointestinal: Negative for abdominal distention, abdominal pain, blood in stool, constipation, diarrhea, nausea and vomiting  Endocrine: Negative for polydipsia and polyuria  Genitourinary: Negative for dysuria and hematuria  Musculoskeletal: Negative for back pain and myalgias  Skin: Negative for rash  Neurological: Negative for syncope and headaches  Psychiatric/Behavioral: Negative for suicidal ideas  PHQ-2/9 Depression Screening         [unfilled]    Objective:      /70 (BP Location: Left arm, Patient Position: Sitting, Cuff Size: Standard)   Pulse 89   Temp 98 7 °F (37 1 °C) (Tympanic)   Resp 18   Ht 4' 10" (1 473 m)   Wt 44 3 kg (97 lb 9 6 oz)   SpO2 98%   BMI 20 40 kg/m²          Physical Exam  Constitutional:       Appearance: She is well-developed  HENT:      Head: Normocephalic and atraumatic  Right Ear: External ear normal       Left Ear: External ear normal       Mouth/Throat:      Pharynx: No oropharyngeal exudate  Eyes:      General: No scleral icterus  Conjunctiva/sclera: Conjunctivae normal       Pupils: Pupils are equal, round, and reactive to light  Cardiovascular:      Rate and Rhythm: Normal rate and regular rhythm  Heart sounds: No murmur heard  No friction rub  No gallop  Pulmonary:      Effort: Pulmonary effort is normal  No respiratory distress  Breath sounds: Normal breath sounds  No wheezing or rales  Abdominal:      General: Bowel sounds are normal  There is no distension  Palpations: Abdomen is soft  There is no mass  Tenderness: There is no abdominal tenderness  There is no rebound  Musculoskeletal:         General: Normal range of motion  Cervical back: Normal range of motion and neck supple  Skin:     General: Skin is warm and dry  Neurological:      Mental Status: She is alert and oriented to person, place, and time

## 2022-06-21 ENCOUNTER — OFFICE VISIT (OUTPATIENT)
Dept: WOUND CARE | Facility: HOSPITAL | Age: 77
End: 2022-06-21
Payer: MEDICARE

## 2022-06-21 VITALS
DIASTOLIC BLOOD PRESSURE: 91 MMHG | HEART RATE: 90 BPM | SYSTOLIC BLOOD PRESSURE: 176 MMHG | TEMPERATURE: 97.4 F | RESPIRATION RATE: 16 BRPM

## 2022-06-21 DIAGNOSIS — L89.894 PRESSURE INJURY OF LEFT FOOT, STAGE 4 (HCC): Primary | ICD-10-CM

## 2022-06-21 PROCEDURE — 11042 DBRDMT SUBQ TIS 1ST 20SQCM/<: CPT | Performed by: FAMILY MEDICINE

## 2022-06-21 RX ORDER — LIDOCAINE 40 MG/G
CREAM TOPICAL ONCE
Status: COMPLETED | OUTPATIENT
Start: 2022-06-21 | End: 2022-06-21

## 2022-06-21 RX ADMIN — LIDOCAINE 1 APPLICATION: 40 CREAM TOPICAL at 14:24

## 2022-06-21 NOTE — PROGRESS NOTES
Patient ID: Lina Silveira is a 67 y o  female Date of Birth 1945       Chief Complaint   Patient presents with    Follow Up Wound Care Visit     Left foot wound       Allergies:  Patient has no known allergies  Diagnosis:      Diagnosis ICD-10-CM Associated Orders   1  Pressure injury of left foot, stage 4 ([facility])  L89 [id number] lidocaine (LMX) 4 % cream     Wound cleansing and dressings     Wound off loading     Debridement           Assessment & Plan:   Deterioration of pressure injury of the left foot, now stage IV with probe to bone  No signs of infection at this time  Significant undermining was noted   Surgical debridement   Continue with silver alginate and bordered foam    Refer to podiatry for further treatment and offloading solutions  Patient may do well with TCC but will need special footwear as well  Subjective:   6/14/22:1st visit for this 69-year-old female who comes to [facility] for evaluation of left foot wound  The patient has a history of polio as a child with her left foot being shorter than the right  Patient states that she tends to walk on the outer aspect of her left foot and gets either corn or callus in the area  She admits to picking at frequently  However this time over the past month or so, she developed an ulceration and is not healing  She does have pain in this area  There is no history of claudication  No fever or chills  Proximally two weeks ago, she developed significant edema of both lower extremities with subsequent development of blisters  They opened and with elevation the swelling went down  All the wounds have closed from this  She went to her primary care who prescribed Augmentin but the patient could not take it because of severe diarrhea  Past medical history is remarkable for lupus and rheumatoid arthritis  6/21/22: Followup of stage III pressure injury of the left plantar foot    Patient has significant pain in his not relieved with Mobic that was given to her by primary care  She states that she is trying to offload the area but continues to wear her sandals  Denies any fever or chills  The following portions of the patient's history were reviewed and updated as appropriate:   Patient Active Problem List   Diagnosis    Anxiety    RLS (restless legs syndrome)    S/P total hip arthroplasty    Hypothyroidism due to Hashimoto's thyroiditis    Age-related osteoporosis without current pathological fracture    RBBB    Ambulatory dysfunction    Closed compression fracture of L3 lumbar vertebra    Rheumatoid arthritis involving multiple sites with positive rheumatoid factor (HCC)    Chronic pain syndrome    Vitamin D deficiency    Dyspepsia    Other forms of systemic lupus erythematosus ([address] )    Depression    Acquired subluxation of left shoulder    Rupture long head biceps tendon, left, initial encounter    S/P reverse total shoulder arthroplasty, left    Encounter for annual wellness exam in Medicare patient    Shoulder pain, bilateral    Failed orthopedic implant ([facility] [address] )    Venous insufficiency    Left foot pain     Past Medical History:   Diagnosis Date    Acute blood loss anemia 9/9/2020    Aftercare following joint replacement 9/9/2020    Patient had right reversed total shoulder arthroplasty   Pain was controlled when he left the hospital       Anxiety     Arthritis     Depression     Disease of thyroid gland     HYPO    Femur neck fracture (HCC)     GERD (gastroesophageal reflux disease)     Hyperthyroidism     RESOLVED: [id number]    Osteoporosis     Polio     PVD (peripheral vascular disease) ([facility] [address] )     Right BBB/left ant fasc block     UTI (urinary tract infection)     Varicella infection     LAST ASSESSED: [id number]     Past Surgical History:   Procedure Laterality Date    APPENDECTOMY      HIP ARTHROPLASTY Left 11/27/2017    Procedure: REMOVAL IM NAIL CONVERSION TO TOTAL HIP ARTHROPLASTY POSTERIOR APPROACH;  Surgeon: Jose Benitez MD;  [facility];  Service: Orthopedics    HIP FRACTURE SURGERY      HIP SURGERY      both hips replaced;2013 left ,2014 right    JOINT REPLACEMENT Right     HIP TOTAL    OR CONV PREV HIP SURG TO TOT HIP [place] Jeremy Left 10/4/2017    Procedure: Luna Rivera removal of left hip;  Surgeon: Jose Benitez MD;  [facility];  Service: Orthopedics    OR RECONSTR TOTAL SHOULDER IMPLANT Right 9/2/2020    Procedure: ARTHROPLASTY SHOULDER REVERSE;  Surgeon: Julio Sotomayor MD;  [facility];  Service: Orthopedics    OR RECONSTR TOTAL SHOULDER IMPLANT Left 6/1/2021    Procedure: ARTHROPLASTY SHOULDER REVERSE;  Surgeon: Julio Sotomayor MD;  [facility];  Service: Orthopedics    OR WRIST Chyrl Arik LIG Right 5/24/2022    Procedure: RELEASE CARPAL TUNNEL ENDOSCOPIC-right;  Surgeon: Dayanna Sherman MD;  [facility];  Service: Orthopedics    REVISION TOTAL HIP ARTHROPLASTY Right     TONSILLECTOMY       Family History   Problem Relation Age of Onset    Rheum arthritis Mother     Rheum arthritis Sister     Prostate cancer Brother       Social History     Socioeconomic History    Marital status:      Spouse name: None    Number of children: 1    Years of education: None    Highest education level: None   Occupational History    Occupation: RETIRED    Tobacco Use    Smoking status: Former Smoker    Smokeless tobacco: Never Used    Tobacco comment: quit 20-30 years ago   Vaping Use    Vaping Use: Never used   Substance and Sexual Activity    Alcohol use: Not Currently    Drug use: Not Currently    Sexual activity: Not Currently   Other Topics Concern    None   Social History Narrative    Always uses seat belt    Caffeine use    Taoism    Single as per all scripts      Social Determinants of Health     Financial Resource Strain: Not on file   Food Insecurity: Not on file   Transportation Needs: Not on file   Physical Activity: Not on file   Stress: Not on file   Social Connections: Not on file   Intimate Partner Violence: Not on file   Housing Stability: Not on file        Current Outpatient Medications:     acetaminophen (TYLENOL) 650 mg CR tablet, Take 1 tablet (650 mg total) by mouth every 8 (eight) hours as needed for mild pain (Patient not taking: No sig reported), Disp: 30 tablet, Rfl: 0    Ascorbic Acid, Vitamin C, (VITAMIN C) 100 MG tablet, Take 400 mg by mouth daily, Disp: , Rfl:     ferrous gluconate (FERGON) 240 (27 FE) MG tablet, Take 1 tablet (240 mg total) by mouth in the morning, Disp: 90 tablet, Rfl: 1    folic acid (FOLVITE) 1 mg tablet, Take 2,000 mcg by mouth daily, Disp: , Rfl:     hydroxychloroquine (PLAQUENIL) 200 mg tablet, 1 pill am 1/2 pill pm, Disp: , Rfl:     levothyroxine 25 mcg tablet, Take 1 tablet (25 mcg total) by mouth daily, Disp: 90 tablet, Rfl: 1    meloxicam (Mobic) 7 5 mg tablet, Take 1 tablet (7 5 mg total) by mouth daily, Disp: 30 tablet, Rfl: 0    methocarbamol (ROBAXIN) 750 mg tablet, Take 1 tablet (750 mg total) by mouth 2 (two) times a day as needed for muscle spasms, Disp: 30 tablet, Rfl: 5    methotrexate 2 5 MG tablet, 3 tablets by mouth once a week on Thursday, Disp:  , Rfl:     Multiple Vitamins-Minerals (CENTRUM SILVER PO), Take 1 tablet by mouth daily, Disp: , Rfl:     nortriptyline (PAMELOR) 50 mg capsule, Take 2 capsules (100 mg total) by mouth daily at bedtime, Disp: 90 capsule, Rfl: 1    predniSONE 5 mg tablet, Take 5 mg by mouth daily, Disp: , Rfl:     rOPINIRole (REQUIP) 2 mg tablet, Take 1 tablet (2 mg total) by mouth daily at bedtime Take 1 tablet by mouth daily at bedtime, Disp: 90 tablet, Rfl: 1  No current facility-administered medications for this visit  Review of Systems   Constitutional: Negative for appetite change, chills, fatigue, fever and unexpected weight change     HENT: Negative for congestion, hearing loss, postnasal drip and sinus pressure  Eyes: Negative for discharge and visual disturbance  Respiratory: Negative for cough and shortness of breath  Cardiovascular: Positive for leg swelling  Negative for chest pain and palpitations  Gastrointestinal: Positive for constipation  Negative for abdominal pain, blood in stool, diarrhea and nausea  Endocrine: Negative  Genitourinary: Negative for difficulty urinating, dysuria and urgency  Musculoskeletal: Positive for joint swelling  Negative for gait problem  Skin: Positive for wound (Left foot)  Negative for rash  Allergic/Immunologic: Negative  Neurological: Positive for weakness  Negative for dizziness, tremors, seizures, numbness and headaches  Hematological: Does not bruise/bleed easily  Psychiatric/Behavioral: Negative for dysphoric mood  The patient is nervous/anxious  Objective:  BP (!) 176/91   Pulse 90   Temp (!) 97 4 °F (36 3 °C)   Resp 16   Pain Score: 10-Worst pain ever     Physical Exam  Vitals and nursing note reviewed  Constitutional:       Appearance: Normal appearance  She is well-developed and normal weight  HENT:      Head: Normocephalic and atraumatic  Cardiovascular:      Rate and Rhythm: Normal rate  Pulses:           Dorsalis pedis pulses are 1+ on the right side and 1+ on the left side  Posterior tibial pulses are 1+ on the right side and 1+ on the left side  Pulmonary:      Effort: Pulmonary effort is normal    Musculoskeletal:        Feet:    Feet:      Left foot:      Skin integrity: Ulcer present  Comments: Larger ulceration with significant undermining up to 1 cm   360°  There is now probe to bone  Pain with manipulation  No malodor and no significant erythema  Skin:     General: Skin is warm and dry  Findings: Wound present  Neurological:      Mental Status: She is alert and oriented to person, place, and time     Psychiatric:         Attention and Perception: Attention normal          Mood and Affect: Mood and affect normal          Behavior: Behavior is cooperative  Cognition and Memory: Cognition normal              Wound 06/14/22 Pressure Injury Foot Left;Plantar (Active)   Wound Image Images linked 06/21/22 1438       Debridement   Wound 06/14/22 Pressure Injury Foot Left;Plantar    Universal Protocol:  Consent: Verbal consent obtained  Written consent obtained  Consent given by: patient  Time out: Immediately prior to procedure a "time out" was called to verify the correct patient, procedure, equipment, support staff and site/side marked as required  Patient understanding: patient states understanding of the procedure being performed  Patient identity confirmed: verbally with patient      Performed by: physician  Debridement type: surgical  Level of debridement: subcutaneous tissue  Pain control: lidocaine 4%  Post-debridement measurements  Length (cm): 1 5  Width (cm): 2  Depth (cm): 0 5  Percent debrided: 100%  Surface Area (cm^2): 3  Area debrided (cm^2): 3  Volume (cm^3): 1 5  Tissue and other material debrided: dermis, epidermis and subcutaneous tissue  Devitalized tissue debrided: fibrin and necrotic debris  Instrument(s) utilized: curette  Bleeding: medium  Hemostasis obtained with: pressure  Procedural pain (0-10): 4  Post-procedural pain: 2   Response to treatment: procedure was tolerated well  Debridement Comments: Partial undermining was all excised but was limited secondary to pain  Necrotic tissue was also debrided  There is probe to bone  Wound Instructions:  Orders Placed This Encounter   Procedures    Wound cleansing and dressings     Left foot wound:     May shower with protection or sponge bath at this time      Cleanse with mild soap and water   Pat dry  Apply silver alginate to wound  Cover with silicone bordered foam dressing  Change dressing 3 times a week and as needed for excessive drainage/dislodgement      This was performed at wound care center today              Standing Status:   Future     Standing Expiration Date:   6/21/2023    Wound off loading     Off-loading Instructions:    Keep weight and pressure off wound at all times  Use walker when walking  Standing Status:   Future     Standing Expiration Date:   6/21/2023    Debridement     This order was created via procedure documentation       Annika Reese MD, CHT, CWS    Portions of the record may have been created with voice recognition software  Occasional wrong word or "sound alike" substitutions may have occurred due to the inherent limitations of voice recognition software  Read the chart carefully and recognize, using context, where substitutions have occurred

## 2022-06-21 NOTE — PATIENT INSTRUCTIONS
Orders Placed This Encounter   Procedures    Wound cleansing and dressings     Left foot wound:     May shower with protection or sponge bath at this time      Cleanse with mild soap and water  Pat dry  Apply silver alginate to wound  Cover with silicone bordered foam dressing  Change dressing 3 times a week and as needed for excessive drainage/dislodgement      This was performed at wound care center today              Standing Status:   Future     Standing Expiration Date:   6/21/2023    Wound off loading     Off-loading Instructions:    Keep weight and pressure off wound at all times  Use walker when walking       Standing Status:   Future     Standing Expiration Date:   6/21/2023

## 2022-06-21 NOTE — ASSESSMENT & PLAN NOTE
Advised to stop putting rubber bands on her foot  Continue to see wound care  Leg heaviness has resolved and she is back to her baseline ambulatory status

## 2022-06-26 DIAGNOSIS — M25.512 CHRONIC LEFT SHOULDER PAIN: Primary | ICD-10-CM

## 2022-06-26 DIAGNOSIS — G89.29 CHRONIC LEFT SHOULDER PAIN: Primary | ICD-10-CM

## 2022-06-27 ENCOUNTER — HOSPITAL ENCOUNTER (EMERGENCY)
Facility: HOSPITAL | Age: 77
Discharge: HOME | End: 2022-06-27
Attending: EMERGENCY MEDICINE
Payer: MEDICARE

## 2022-06-27 VITALS
OXYGEN SATURATION: 100 % | DIASTOLIC BLOOD PRESSURE: 58 MMHG | RESPIRATION RATE: 18 BRPM | HEART RATE: 88 BPM | TEMPERATURE: 98 F | SYSTOLIC BLOOD PRESSURE: 123 MMHG

## 2022-06-27 DIAGNOSIS — G25.81 RESTLESS LEG SYNDROME: Primary | ICD-10-CM

## 2022-06-27 DIAGNOSIS — M79.604 BILATERAL LEG PAIN: ICD-10-CM

## 2022-06-27 DIAGNOSIS — M79.605 BILATERAL LEG PAIN: ICD-10-CM

## 2022-06-27 LAB
ANION GAP SERPL CALCULATED.3IONS-SCNC: 12 MMOL/L (ref 4–13)
BUN SERPL-MCNC: 17 MG/DL (ref 5–25)
CALCIUM SERPL-MCNC: 9.2 MG/DL (ref 8.3–10.1)
CHLORIDE SERPL-SCNC: 103 MMOL/L (ref 100–108)
CO2 SERPL-SCNC: 22 MMOL/L (ref 21–32)
CREAT SERPL-MCNC: 0.53 MG/DL (ref 0.6–1.3)
GFR SERPL CREATININE-BSD FRML MDRD: 91 ML/MIN/1.73SQ M
GLUCOSE SERPL-MCNC: 96 MG/DL (ref 65–140)
POTASSIUM SERPL-SCNC: 3.3 MMOL/L (ref 3.5–5.3)
SODIUM SERPL-SCNC: 137 MMOL/L (ref 136–145)

## 2022-06-27 PROCEDURE — 80048 BASIC METABOLIC PNL TOTAL CA: CPT

## 2022-06-27 PROCEDURE — 96372 THER/PROPH/DIAG INJ SC/IM: CPT

## 2022-06-27 PROCEDURE — 99284 EMERGENCY DEPT VISIT MOD MDM: CPT

## 2022-06-27 PROCEDURE — 36415 COLL VENOUS BLD VENIPUNCTURE: CPT

## 2022-06-27 PROCEDURE — 99284 EMERGENCY DEPT VISIT MOD MDM: CPT | Performed by: EMERGENCY MEDICINE

## 2022-06-27 RX ORDER — DIAZEPAM 2 MG/1
2 TABLET ORAL ONCE
Status: COMPLETED | OUTPATIENT
Start: 2022-06-27 | End: 2022-06-27

## 2022-06-27 RX ORDER — KETOROLAC TROMETHAMINE 30 MG/ML
15 INJECTION, SOLUTION INTRAMUSCULAR; INTRAVENOUS ONCE
Status: DISCONTINUED | OUTPATIENT
Start: 2022-06-27 | End: 2022-06-27

## 2022-06-27 RX ORDER — ACETAMINOPHEN 325 MG/1
650 TABLET ORAL ONCE
Status: COMPLETED | OUTPATIENT
Start: 2022-06-27 | End: 2022-06-27

## 2022-06-27 RX ORDER — ROPINIROLE 2 MG/1
2 TABLET, FILM COATED ORAL ONCE
Status: COMPLETED | OUTPATIENT
Start: 2022-06-27 | End: 2022-06-27

## 2022-06-27 RX ORDER — DIAZEPAM 5 MG/1
5 TABLET ORAL ONCE
Status: COMPLETED | OUTPATIENT
Start: 2022-06-27 | End: 2022-06-27

## 2022-06-27 RX ORDER — POTASSIUM CHLORIDE 20 MEQ/1
40 TABLET, EXTENDED RELEASE ORAL ONCE
Status: COMPLETED | OUTPATIENT
Start: 2022-06-27 | End: 2022-06-27

## 2022-06-27 RX ORDER — KETOROLAC TROMETHAMINE 30 MG/ML
15 INJECTION, SOLUTION INTRAMUSCULAR; INTRAVENOUS ONCE
Status: COMPLETED | OUTPATIENT
Start: 2022-06-27 | End: 2022-06-27

## 2022-06-27 RX ADMIN — KETOROLAC TROMETHAMINE 15 MG: 30 INJECTION, SOLUTION INTRAMUSCULAR; INTRAVENOUS at 05:18

## 2022-06-27 RX ADMIN — ACETAMINOPHEN 650 MG: 325 TABLET, FILM COATED ORAL at 04:51

## 2022-06-27 RX ADMIN — DIAZEPAM 5 MG: 5 TABLET ORAL at 06:00

## 2022-06-27 RX ADMIN — POTASSIUM CHLORIDE 40 MEQ: 1500 TABLET, EXTENDED RELEASE ORAL at 06:00

## 2022-06-27 RX ADMIN — DIAZEPAM 2 MG: 2 TABLET ORAL at 04:51

## 2022-06-27 RX ADMIN — ROPINIROLE 2 MG: 2 TABLET, FILM COATED ORAL at 05:18

## 2022-06-27 NOTE — ED ATTENDING ATTESTATION
6/27/2022  I saw and evaluated the patient  I have discussed the patient with the resident physician and agree with the resident's findings, assessment and plan as documented in the resident physician's note, unless otherwise documented below  All available laboratory and imaging studies were reviewed by myself  I was present for key portions of any procedure(s) performed by the resident and I was immediately available to provide assistance  I agree with the current assessment done in the Emergency Department  I have conducted an independent evaluation of this patient  Emergency Department Note- Andry Sanchez Colon 65 y o  female MRN: [id number]    Unit/Bed#: [facility] Encounter: [id number]    Chief Complaint   Patient presents with    Leg Pain     Pt was walking at home and fell because "both legs hurt"; no HS; (-) thinners  Pt c/o b/l leg pain/restless legs       Isadora Serrano is a 65 y o  female with past medical history of rheumatoid arthritis, peripheral vascular disease, hypothyroidism, arthritis, polio, restless legs syndrome treated with Tylenol, as well as ropinirole, presenting with bilateral leg cramping, pain, and spasms  Patient reports that she frequently has exacerbation of bilateral leg pain that goes from her toes all the way up to her thighs bilaterally  It is associated with significant spasms and the only thing that makes the symptoms better is continuous movement  She usually ambulates with a walker  Today, due to both legs hurting, patient did end up falling down from standing onto the commode, but denies hurting herself  She is ultimately presenting since her Requip only works for very short period of time and she is having quite a bit of discomfort  It is getting in the way of her sleeping      REVIEW OF SYSTEMS    Constitutional: denies fevers  HENT: no rhinorrhea, no sore throat  Cardiac: no chest pain  Respiratory: no shortness of breath  GI: no abdominal pain, nausea, vomiting, or diarrhea  : no burning with urination  Heme/Onc: no easy bruising  Rheumatologic: History of rheumatoid arthritis  Endocrine: no diabetes  Neuro:  History of polio, history of RLS    Ten systems reviewed and negative unless otherwise noted in HPI and above    PAST MEDICAL HISTORY  Past Medical History:   Diagnosis Date    Acute blood loss anemia 9/9/2020    Aftercare following joint replacement 9/9/2020    Patient had right reversed total shoulder arthroplasty   Pain was controlled when he left the hospital       Anxiety     Arthritis     Depression     Disease of thyroid gland     HYPO    Femur neck fracture (HCC)     GERD (gastroesophageal reflux disease)     Hyperthyroidism     RESOLVED: [id number]    Osteoporosis     Polio     PVD [facility]peripheral vascular disease) ([facility] [address] )     Right BBB/left ant fasc block     UTI (urinary tract infection)     Varicella infection     LAST ASSESSED: [id number]       SURGICAL HISTORY  Past Surgical History:   Procedure Laterality Date    APPENDECTOMY      HIP ARTHROPLASTY Left 11/27/2017    Procedure: REMOVAL IM NAIL CONVERSION TO TOTAL HIP ARTHROPLASTY POSTERIOR APPROACH;  Surgeon: Fatou Campbell MD;  [facility];  Service: Orthopedics    HIP FRACTURE SURGERY      HIP SURGERY      both hips replaced;2013 left ,2014 right    JOINT REPLACEMENT Right     HIP TOTAL    CO CONV PREV HIP SURG TO TOT HIP [place]man[place] Basket Left 10/4/2017    Procedure: [place] removal of left hip;  Surgeon: Fatou Campbell MD;  [facility];  Service: Orthopedics    CO RECONSTR TOTAL SHOULDER IMPLANT Right 9/2/2020    Procedure: ARTHROPLASTY SHOULDER REVERSE;  Surgeon: Luz Ramon MD;  [facility];  Service: Orthopedics    CO [address] IMPLANT Left 6/1/2021    Procedure: ARTHROPLASTY SHOULDER REVERSE;  Surgeon: Luz Ramon MD;  [facility];  Service: Orthopedics    CO WRIST Peggye Peaks LIG Right 5/24/2022    Procedure: RELEASE CARPAL TUNNEL ENDOSCOPIC-right;  Surgeon: Francia Tuttle MD;  [facility];  Service: Orthopedics    REVISION TOTAL HIP ARTHROPLASTY Right     TONSILLECTOMY         FAMILY HISTORY  Family History   Problem Relation Age of Onset    Rheum arthritis Mother     Rheum arthritis Sister     Prostate cancer Brother         CURRENT MEDICATIONS  No current facility-administered medications on file prior to encounter       Current Outpatient Medications on File Prior to Encounter   Medication Sig    acetaminophen (TYLENOL) 650 mg CR tablet Take 1 tablet (650 mg total) by mouth every 8 (eight) hours as needed for mild pain (Patient not taking: No sig reported)    Ascorbic Acid, Vitamin C, (VITAMIN C) 100 MG tablet Take 400 mg by mouth daily    ferrous gluconate (FERGON) 240 (27 FE) MG tablet Take 1 tablet (240 mg total) by mouth in the morning    folic acid (FOLVITE) 1 mg tablet Take 2,000 mcg by mouth daily    hydroxychloroquine (PLAQUENIL) 200 mg tablet 1 pill am 1/2 pill pm    levothyroxine 25 mcg tablet Take 1 tablet (25 mcg total) by mouth daily    meloxicam (Mobic) 7 5 mg tablet Take 1 tablet (7 5 mg total) by mouth daily    methocarbamol (ROBAXIN) 750 mg tablet Take 1 tablet (750 mg total) by mouth 2 (two) times a day as needed for muscle spasms    methotrexate 2 5 MG tablet 3 tablets by mouth once a week on Thursday    Multiple Vitamins-Minerals (CENTRUM SILVER PO) Take 1 tablet by mouth daily    nortriptyline (PAMELOR) 50 mg capsule Take 2 capsules (100 mg total) by mouth daily at bedtime    predniSONE 5 mg tablet Take 5 mg by mouth daily    rOPINIRole (REQUIP) 2 mg tablet Take 1 tablet (2 mg total) by mouth daily at bedtime Take 1 tablet by mouth daily at bedtime       ALLERGIES  No Known Allergies    SOCIAL HISTORY  Social History     Socioeconomic History    Marital status:      Spouse name: None    Number of children: 1    Years of education: None    Highest education level: None   Occupational History    Occupation: RETIRED    Tobacco Use    Smoking status: Former Smoker    Smokeless tobacco: Never Used    Tobacco comment: quit 20-30 years ago   Vaping Use    Vaping Use: Never used   Substance and Sexual Activity    Alcohol use: Not Currently    Drug use: Not Currently    Sexual activity: Not Currently   Other Topics Concern    None   Social History Narrative    Always uses seat belt    Caffeine use    Zoroastrianism    Single as per all scripts      Social Determinants of Health     Financial Resource Strain: Not on file   Food Insecurity: Not on file   Transportation Needs: Not on file   Physical Activity: Not on file   Stress: Not on file   Social Connections: Not on file   Intimate Partner Violence: Not on file   Housing Stability: Not on file       PHYSICAL EXAM  /73   Pulse 102   Temp 98 °F (36 7 °C) (Oral)   Resp 18   SpO2 100%   Vital signs and nursing notes reviewed    CONSTITUTIONAL: female appearing stated age resting in bed, continuously moving bilateral lower extremities, appears in quite a bit of discomfort  HEENT: atraumatic, normocephalic  Sclera anicteric, conjunctiva are not injected  Moist oral mucosa  CARDIOVASCULAR/CHEST: RRR, no M/R/G  2+ radial pulses  PULMONARY: Breathing comfortably on RA  Breath sounds are equal and clear to auscultation  ABDOMEN: non-distended  BS present, normoactive  Non-tender  MSK: moves all extremities, no deformities, no peripheral edema, no calf asymmetry  NEURO: Awake, alert, and oriented x 3  Face symmetric  Moves all extremities spontaneously  Patient is able to fire hip flexors, hamstrings, tibialis anterior, gastrocsoleus, EHL, and FHL  She is continuously moving her bilateral lower extremities  There is no myoclonus    SKIN: Warm, appears well-perfused  MENTAL STATUS: Normal affect        DIAGNOSTIC STUDIES  Results Reviewed     Procedure Component Value Units Date/Time    Basic metabolic panel [[id number]]  (Abnormal) Collected: 06/27/22 0456    Lab Status: Final result Specimen: Blood from Arm, Left Updated: 06/27/22 0536     Sodium 137 mmol/L      Potassium 3 3 mmol/L      Chloride 103 mmol/L      CO2 22 mmol/L      ANION GAP 12 mmol/L      BUN 17 mg/dL      Creatinine 0 53 mg/dL      Glucose 96 mg/dL      Calcium 9 2 mg/dL      eGFR 91 ml/min/1 73sq m     Narrative:      [place] guidelines for Chronic Kidney Disease (CKD):     Stage 1 with normal or high GFR (GFR > 90 mL/min/1 73 square meters)    Stage 2 Mild CKD (GFR = 60-89 mL/min/1 73 square meters)    Stage 3A Moderate CKD (GFR = 45-59 mL/min/1 73 square meters)    Stage 3B Moderate CKD (GFR = 30-44 mL/min/1 73 square meters)    Stage 4 Severe CKD (GFR = 15-29 mL/min/1 73 square meters)    Stage 5 End Stage CKD (GFR <15 mL/min/1 73 square meters)  Note: GFR calculation is accurate only with a steady state creatinine          ED COURSE  Medications   acetaminophen (TYLENOL) tablet 650 mg (650 mg Oral Given 6/27/22 0451)   rOPINIRole (REQUIP) tablet 2 mg (2 mg Oral Given 6/27/22 0518)   diazepam (VALIUM) tablet 2 mg (2 mg Oral Given 6/27/22 0451)   ketorolac (TORADOL) injection 15 mg (15 mg Intramuscular Given 6/27/22 0518)   diazepam (VALIUM) tablet 5 mg (5 mg Oral Given 6/27/22 0600)   potassium chloride (K-DUR,KLOR-CON) CR tablet 40 mEq (40 mEq Oral Given 6/27/22 50)     74-year-old female presenting with bilateral leg pain that is only improved with movement  Medical record including most recent visit on 06/09 reviewed  Patient has a history of our last   She also has a history of polyuria  Differential diagnosis includes restless leg syndrome, electrolyte abnormality, neuralgia, versus another etiology of symptoms  Nothing to suggest spinal cord compression  Patient treated symptomatically with Toradol, Tylenol, Requip and Valium  Her BMP reveals mild hypokalemia of 3 3    Potassium repleted orally  On re-evaluation, patient reports that pain is weakly improved  Another dose of Valium administered  On re-evaluation, patient is much better  I do recommend that patient has a close follow-up appointment with Neurology  Patient discharged to home with recommendations for symptom control, return precautions, and plan for follow up         CLINICAL IMPRESSION  Final diagnoses:   Restless leg syndrome   Bilateral leg pain       Discharge Medication List as of 6/27/2022  6:15 AM      CONTINUE these medications which have NOT CHANGED    Details   acetaminophen (TYLENOL) 650 mg CR tablet Take 1 tablet (650 mg total) by mouth every 8 (eight) hours as needed for mild pain, Starting Tue 5/24/2022, Normal      Ascorbic Acid, Vitamin C, (VITAMIN C) 100 MG tablet Take 400 mg by mouth daily, Historical Med      ferrous gluconate (FERGON) 240 (27 FE) MG tablet Take 1 tablet (240 mg total) by mouth in the morning, Starting Tue 2/57/3228, Normal      folic acid (FOLVITE) 1 mg tablet Take 2,000 mcg by mouth daily, Starting Thu 2/24/2022, Historical Med      hydroxychloroquine (PLAQUENIL) 200 mg tablet 1 pill am 1/2 pill pm, Starting Sat 4/4/2020, Historical Med      levothyroxine 25 mcg tablet Take 1 tablet (25 mcg total) by mouth daily, Starting Mon 11/29/2021, Normal      meloxicam (Mobic) 7 5 mg tablet Take 1 tablet (7 5 mg total) by mouth daily, Starting Wed 6/15/2022, Normal      methocarbamol (ROBAXIN) 750 mg tablet Take 1 tablet (750 mg total) by mouth 2 (two) times a day as needed for muscle spasms, Starting Mon 5/2/2022, Normal      methotrexate 2 5 MG tablet 3 tablets by mouth once a week on Thursday, No Print      Multiple Vitamins-Minerals (CENTRUM SILVER PO) Take 1 tablet by mouth daily, Starting Tue 12/13/2016, Historical Med      nortriptyline (PAMELOR) 50 mg capsule Take 2 capsules (100 mg total) by mouth daily at bedtime, Starting Fri 2/11/2022, Normal      predniSONE 5 mg tablet Take 5 mg by mouth daily, Starting Mon 9/28/2020, Historical Med      rOPINIRole (REQUIP) 2 mg tablet Take 1 tablet (2 mg total) by mouth daily at bedtime Take 1 tablet by mouth daily at bedtime, Starting Tue 1/25/2022, Normal

## 2022-06-27 NOTE — ED PROVIDER NOTES
History  Chief Complaint   Patient presents with    Leg Pain     Pt was walking at home and fell because "both legs hurt"; no HS; (-) thinners  Pt c/o b/l leg pain/restless legs     64 yo F w/ hx of restless leg syndrome presents w/ bilateral leg pain and inability to keep her legs still  Sxs worsening over the past 2 days  Despite triage note, patient denies falling  Denies leg trauma  Pt is compliant w/ her home medications  Pain is moderate to severe, constant, and diffuse throughout bilateral LEs  Prior to Admission Medications   Prescriptions Last Dose Informant Patient Reported? Taking?    Ascorbic Acid, Vitamin C, (VITAMIN C) 100 MG tablet  Self Yes No   Sig: Take 400 mg by mouth daily   Multiple Vitamins-Minerals (CENTRUM SILVER PO)  Self Yes No   Sig: Take 1 tablet by mouth daily   acetaminophen (TYLENOL) 650 mg CR tablet  Self No No   Sig: Take 1 tablet (650 mg total) by mouth every 8 (eight) hours as needed for mild pain   Patient not taking: No sig reported   ferrous gluconate (FERGON) 240 (27 FE) MG tablet  Self No No   Sig: Take 1 tablet (240 mg total) by mouth in the morning   folic acid (FOLVITE) 1 mg tablet  Self Yes No   Sig: Take 2,000 mcg by mouth daily   hydroxychloroquine (PLAQUENIL) 200 mg tablet  Self Yes No   Si pill am 1/2 pill pm   levothyroxine 25 mcg tablet  Self No No   Sig: Take 1 tablet (25 mcg total) by mouth daily   meloxicam (Mobic) 7 5 mg tablet  Self No No   Sig: Take 1 tablet (7 5 mg total) by mouth daily   methotrexate 2 5 MG tablet  Self No No   Sig: 3 tablets by mouth once a week on Thursday   nortriptyline (PAMELOR) 50 mg capsule  Self No No   Sig: Take 2 capsules (100 mg total) by mouth daily at bedtime   predniSONE 5 mg tablet  Self Yes No   Sig: Take 5 mg by mouth daily   rOPINIRole (REQUIP) 2 mg tablet  Self No No   Sig: Take 1 tablet (2 mg total) by mouth daily at bedtime Take 1 tablet by mouth daily at bedtime      Facility-Administered Medications: None Past Medical History:   Diagnosis Date    Acute blood loss anemia 9/9/2020    Aftercare following joint replacement 9/9/2020    Patient had right reversed total shoulder arthroplasty   Pain was controlled when he left the hospital       Anxiety     Arthritis     Depression     Disease of thyroid gland     HYPO    Femur neck fracture (HCC)     GERD (gastroesophageal reflux disease)     Hyperthyroidism     RESOLVED: [id number]    Osteoporosis     Polio     PVD (peripheral vascular disease) ([address] )     Right BBB/left ant fasc block     UTI (urinary tract infection)     Varicella infection     LAST ASSESSED: [id number]       Past Surgical History:   Procedure Laterality Date    APPENDECTOMY      HIP ARTHROPLASTY Left 11/27/2017    Procedure: REMOVAL IM NAIL CONVERSION TO TOTAL HIP ARTHROPLASTY POSTERIOR APPROACH;  Surgeon: Paige Haro MD;  [facility];  Service: Orthopedics    HIP FRACTURE SURGERY      HIP SURGERY      both hips replaced;2013 left ,2014 right    JOINT REPLACEMENT Right     HIP TOTAL    NE CONV PREV HIP SURG TO TOT HIP [place] Left 10/4/2017    Procedure: [place] removal of left hip;  Surgeon: Paige Haro MD;  [facility];  Service: Orthopedics    NE RECONSTR TOTAL SHOULDER IMPLANT Right 9/2/2020    Procedure: ARTHROPLASTY SHOULDER REVERSE;  Surgeon: Garfield Enciso MD;  [facility];  Service: Orthopedics    NE RECONSTR TOTAL SHOULDER IMPLANT Left 6/1/2021    Procedure: ARTHROPLASTY SHOULDER REVERSE;  Surgeon: Garfield Enciso MD;  [facility];  Service: Orthopedics    NE WRIST Sheryn Busman LIG Right 5/24/2022    Procedure: RELEASE CARPAL TUNNEL ENDOSCOPIC-right;  Surgeon: Oriana Aviles MD;  [facility];  Service: Orthopedics    REVISION TOTAL HIP ARTHROPLASTY Right     TONSILLECTOMY         Family History   Problem Relation Age of Onset    Rheum arthritis Mother     Rheum arthritis Sister    Rosario Hi Prostate cancer Brother      I have reviewed and agree with the history as documented  E-Cigarette/Vaping    E-Cigarette Use Never User      E-Cigarette/Vaping Substances    Nicotine No     THC No     CBD No     Flavoring No     Other No     Unknown No      Social History     Tobacco Use    Smoking status: Former Smoker    Smokeless tobacco: Never Used    Tobacco comment: quit 20-30 years ago   Vaping Use    Vaping Use: Never used   Substance Use Topics    Alcohol use: Not Currently    Drug use: Not Currently        Review of Systems   Constitutional: Negative for chills and fever  HENT: Negative for ear pain and sore throat  Eyes: Negative for pain and visual disturbance  Respiratory: Negative for cough and shortness of breath  Cardiovascular: Negative for chest pain and palpitations  Gastrointestinal: Negative for abdominal pain and vomiting  Genitourinary: Negative for dysuria and hematuria  Musculoskeletal: Positive for myalgias  Negative for arthralgias and back pain  Skin: Negative for color change and rash  Neurological: Negative for seizures and syncope  All other systems reviewed and are negative  Physical Exam  ED Triage Vitals [06/27/22 0409]   Temperature Pulse Respirations Blood Pressure SpO2   98 °F (36 7 °C) 102 18 155/73 100 %      Temp Source Heart Rate Source Patient Position - Orthostatic VS BP Location FiO2 (%)   Oral -- -- -- --      Pain Score       10 - Worst Possible Pain             Orthostatic Vital Signs  Vitals:    06/27/22 0409 06/27/22 0600   BP: 155/73 123/58   Pulse: 102 88       Physical Exam  Vitals and nursing note reviewed  Constitutional:       General: She is in acute distress  Appearance: Normal appearance  She is not ill-appearing, toxic-appearing or diaphoretic  HENT:      Head: Normocephalic and atraumatic        Right Ear: External ear normal       Left Ear: External ear normal       Nose: Nose normal       Mouth/Throat: Mouth: Mucous membranes are moist    Eyes:      General:         Right eye: No discharge  Left eye: No discharge  Conjunctiva/sclera: Conjunctivae normal    Cardiovascular:      Rate and Rhythm: Normal rate  Pulses: Normal pulses  Pulmonary:      Effort: Pulmonary effort is normal  No respiratory distress  Abdominal:      General: Abdomen is flat  There is no distension  Palpations: Abdomen is soft  Tenderness: There is no abdominal tenderness  There is no guarding  Musculoskeletal:         General: No swelling, tenderness, deformity or signs of injury  Normal range of motion  Cervical back: Normal range of motion  Right lower leg: Edema present  Left lower leg: Edema present  Skin:     General: Skin is warm and dry  Capillary Refill: Capillary refill takes less than 2 seconds  Coloration: Skin is not pale  Neurological:      Mental Status: She is alert and oriented to person, place, and time  Sensory: No sensory deficit  Motor: No weakness     Psychiatric:         Mood and Affect: Mood normal          Behavior: Behavior normal          ED Medications  Medications   acetaminophen (TYLENOL) tablet 650 mg (650 mg Oral Given 6/27/22 0451)   rOPINIRole (REQUIP) tablet 2 mg (2 mg Oral Given 6/27/22 0518)   diazepam (VALIUM) tablet 2 mg (2 mg Oral Given 6/27/22 0451)   ketorolac (TORADOL) injection 15 mg (15 mg Intramuscular Given 6/27/22 0518)   diazepam (VALIUM) tablet 5 mg (5 mg Oral Given 6/27/22 0600)   potassium chloride (K-DUR,KLOR-CON) CR tablet 40 mEq (40 mEq Oral Given 6/27/22 0600)       Diagnostic Studies  Results Reviewed     Procedure Component Value Units Date/Time    Basic metabolic panel [[id number]]  (Abnormal) Collected: 06/27/22 0456    Lab Status: Final result Specimen: Blood from Arm, Left Updated: 06/27/22 0536     Sodium 137 mmol/L      Potassium 3 3 mmol/L      Chloride 103 mmol/L      CO2 22 mmol/L      ANION GAP 12 mmol/L BUN 17 mg/dL      Creatinine 0 53 mg/dL      Glucose 96 mg/dL      Calcium 9 2 mg/dL      eGFR 91 ml/min/1 73sq m     Narrative:      [place] guidelines for Chronic Kidney Disease (CKD):     Stage 1 with normal or high GFR (GFR > 90 mL/min/1 73 square meters)    Stage 2 Mild CKD (GFR = 60-89 mL/min/1 73 square meters)    Stage 3A Moderate CKD (GFR = 45-59 mL/min/1 73 square meters)    Stage 3B Moderate CKD (GFR = 30-44 mL/min/1 73 square meters)    Stage 4 Severe CKD (GFR = 15-29 mL/min/1 73 square meters)    Stage 5 End Stage CKD (GFR <15 mL/min/1 73 square meters)  Note: GFR calculation is accurate only with a steady state creatinine                 No orders to display         Procedures  Procedures      ED Course                             SBIRT 20yo+    Flowsheet Row Most Recent Value   SBIRT (25 yo +)    In order to provide better care to our patients, we are screening all of our patients for alcohol and drug use  Would it be okay to ask you these screening questions? No Filed at: 06/27/2022 0737                [facility]  Number of Diagnoses or Management Options  Bilateral leg pain: established and worsening  Restless leg syndrome: established and worsening  Diagnosis management comments: Impression: sxs likely related to underlying dx of restless leg syndrome  No tenderness or signs of trauma  Plan: BMP, symptomatic treatment    Pt notes resolution of sxs after treatment   Follow up with neurology for long-term management of restless leg syndrome       Amount and/or Complexity of Data Reviewed  Clinical lab tests: ordered and reviewed  Review and summarize past medical records: yes    Risk of Complications, Morbidity, and/or Mortality  Presenting problems: low  Diagnostic procedures: minimal  Management options: low    Patient Progress  Patient progress: improved      Disposition  Final diagnoses:   Restless leg syndrome   Bilateral leg pain     Time reflects when diagnosis was documented in both MDM as applicable and the Disposition within this note     Time User Action Codes Description Comment    6/27/2022  6:14 AM Earnstine Boatman Add [G25 81] Restless leg syndrome     6/27/2022  6:14 AM Earnstine Boatman Add [B09 [id number],  A39 897] Bilateral leg pain       ED Disposition     ED Disposition   Discharge    Condition   Stable    Date/Time   Mon Jun 27, 2022  6:14 AM    Comment   Geetha Colon discharge to home/self care                 Follow-up Information    None         Discharge Medication List as of 6/27/2022  6:15 AM      CONTINUE these medications which have NOT CHANGED    Details   acetaminophen (TYLENOL) 650 mg CR tablet Take 1 tablet (650 mg total) by mouth every 8 (eight) hours as needed for mild pain, Starting Tue 5/24/2022, Normal      Ascorbic Acid, Vitamin C, (VITAMIN C) 100 MG tablet Take 400 mg by mouth daily, Historical Med      ferrous gluconate (FERGON) 240 (27 FE) MG tablet Take 1 tablet (240 mg total) by mouth in the morning, Starting Tue 9/63/7621, Normal      folic acid (FOLVITE) 1 mg tablet Take 2,000 mcg by mouth daily, Starting Thu 2/24/2022, Historical Med      hydroxychloroquine (PLAQUENIL) 200 mg tablet 1 pill am 1/2 pill pm, Starting Sat 4/4/2020, Historical Med      levothyroxine 25 mcg tablet Take 1 tablet (25 mcg total) by mouth daily, Starting Mon 11/29/2021, Normal      meloxicam (Mobic) 7 5 mg tablet Take 1 tablet (7 5 mg total) by mouth daily, Starting Wed 6/15/2022, Normal      methotrexate 2 5 MG tablet 3 tablets by mouth once a week on Thursday, No Print      Multiple Vitamins-Minerals (CENTRUM SILVER PO) Take 1 tablet by mouth daily, Starting Tue 12/13/2016, Historical Med      nortriptyline (PAMELOR) 50 mg capsule Take 2 capsules (100 mg total) by mouth daily at bedtime, Starting Fri 2/11/2022, Normal      predniSONE 5 mg tablet Take 5 mg by mouth daily, Starting Mon 9/28/2020, Historical Med      rOPINIRole (REQUIP) 2 mg tablet Take 1 tablet (2 mg total) by mouth daily at bedtime Take 1 tablet by mouth daily at bedtime, Starting Tue 1/25/2022, Normal      methocarbamol (ROBAXIN) 750 mg tablet Take 1 tablet (750 mg total) by mouth 2 (two) times a day as needed for muscle spasms, Starting Mon 5/2/2022, Normal               PDMP Review       Value Time User    PDMP Reviewed  Yes 5/24/2022  8:41 AM Thu Gaston PA-C           ED Provider  Attending physically available and evaluated Geetha Rivera I managed the patient along with the ED Attending      Electronically Signed by         Deretha Dubin, MD  07/05/22 4829

## 2022-06-29 DIAGNOSIS — G25.81 RLS (RESTLESS LEGS SYNDROME): ICD-10-CM

## 2022-06-29 RX ORDER — METHOCARBAMOL 750 MG/1
TABLET, FILM COATED ORAL
Qty: 30 TABLET | Refills: 0 | Status: SHIPPED | OUTPATIENT
Start: 2022-06-29

## 2022-06-30 ENCOUNTER — OFFICE VISIT (OUTPATIENT)
Dept: WOUND CARE | Facility: HOSPITAL | Age: 77
End: 2022-06-30
Payer: MEDICARE

## 2022-06-30 VITALS
SYSTOLIC BLOOD PRESSURE: 173 MMHG | DIASTOLIC BLOOD PRESSURE: 83 MMHG | RESPIRATION RATE: 16 BRPM | TEMPERATURE: 98 F | HEART RATE: 81 BPM

## 2022-06-30 DIAGNOSIS — L89.894 PRESSURE INJURY OF LEFT FOOT, STAGE 4 (HCC): Primary | ICD-10-CM

## 2022-06-30 DIAGNOSIS — I73.9 PERIPHERAL VASCULAR DISEASE, UNSPECIFIED (HCC): ICD-10-CM

## 2022-06-30 PROCEDURE — 99203 OFFICE O/P NEW LOW 30 MIN: CPT | Performed by: PODIATRIST

## 2022-06-30 PROCEDURE — 99212 OFFICE O/P EST SF 10 MIN: CPT | Performed by: PODIATRIST

## 2022-06-30 RX ORDER — LIDOCAINE 40 MG/G
CREAM TOPICAL ONCE
Status: COMPLETED | OUTPATIENT
Start: 2022-06-30 | End: 2022-06-30

## 2022-06-30 RX ADMIN — LIDOCAINE 1 APPLICATION: 40 CREAM TOPICAL at 10:39

## 2022-06-30 NOTE — PROGRESS NOTES
Patient ID: Tushar Jeffrey is a 65 y o  female Date of Birth 1945     Chief Complaint  Chief Complaint   Patient presents with    Follow Up Wound Care Visit     Left Foot wound       Allergies  Patient has no known allergies  Assessment:    No problem-specific Assessment & Plan notes found for this encounter  Diagnoses and all orders for this visit:    Pressure injury of left foot, stage 4 (HCC)  -     lidocaine (LMX) 4 % cream  -     Cancel: Wound cleansing and dressings; Future  -     MRI foot/forefoot toes left wo contrast; Future  -     VAS lower limb arterial duplex, complete bilateral; Future  -     Wound cleansing and dressings; Future    Peripheral vascular disease, unspecified (HCC)  -     VAS lower limb arterial duplex, complete bilateral; Future          Procedures    Plan:  1  Reviewed the previous wound care notes  Reviewed / discussed X-ray of left foot  2  Patient was counseled and educated on the condition and the diagnosis  3  Wound is deep to capsular area of left 5th MPJ and presents for 2 months without healing  Recommended MRI concerning possible chronic osteomyelitis  STAT order was given  4  Also sent her for arterial study to rule out PAD  5  Discussed proper off-loading  Local care with silver alginate  6  She will return in 1 week  Wound 06/14/22 Pressure Injury Foot Left;Plantar (Active)   Wound Image Images linked 06/30/22 1030   Wound Description Pink;Yellow;Slough;Probes to bone 06/30/22 1030   Tiffani-wound Assessment Callus; Maceration 06/30/22 1030   Wound Length (cm) 1 3 cm 06/30/22 1030   Wound Width (cm) 2 cm 06/30/22 1030   Wound Depth (cm) 0 4 cm 06/30/22 1030   Wound Surface Area (cm^2) 2 6 cm^2 06/30/22 1030   Wound Volume (cm^3) 1 04 cm^3 06/30/22 1030   Calculated Wound Volume (cm^3) 1 04 cm^3 06/30/22 1030   Change in Wound Size % 30 67 06/30/22 1030   Undermining 0 7 06/30/22 1030   Undermining is depth extending from 1-3 oclock 06/30/22 1030 Drainage Amount Moderate 06/30/22 1030   Drainage Description Serosanguineous; Yellow 06/30/22 1030   Non-staged Wound Description Full thickness 06/30/22 1030       Wound 06/14/22 Pressure Injury Foot Left;Plantar (Active)   Date First Assessed/Time First Assessed: 06/14/22 1308   Primary Wound Type: Pressure Injury  Location: Foot  Wound Location Orientation: Left;Plantar  Wound Description (Comments): stage 3       [REMOVED] Wound 09/02/20 Shoulder Right (Removed)   Resolved Date: 06/14/22  Date First Assessed/Time First Assessed: 09/02/20 1147   Location: Shoulder  Wound Location Orientation: Right  Wound Description (Comments): pillow sling applied  Incision's 1st Dressing: DRESSING MEPILEX AG BORDER 4 X 8 IN (  [REMOVED] Wound 06/01/21 Shoulder Left (Removed)   Resolved Date: 06/14/22  Date First Assessed/Time First Assessed: 06/01/21 1157   Location: Shoulder  Wound Location Orientation: Left  Wound Description (Comments): staples  pillow sling  Incision's 1st Dressing: DRESSING MEPILEX AG BORDER 4 X 8 IN      [REMOVED] Wound 05/24/22 Wrist Right (Removed)   Resolved Date: 06/14/22  Date First Assessed/Time First Assessed: 05/24/22 1005   Location: Wrist  Wound Location Orientation: Right  Incision's 1st Dressing: DRESSING XEROFORM 1 X 8 (x1), SPONGE GAUZE 4 X 4 16 PLY STRL PLASTIC TRAY LF (x1), BANDAGE K  [REMOVED] Wound 05/24/22 Hand Right (Removed)   Resolved Date: 06/14/22  Date First Assessed/Time First Assessed: 05/24/22 1005   Location: Hand  Wound Location Orientation: Right  Wound Outcome: Unknown (No longer present)         Subjective:      HPI  Filomena Han was referred to my office for evaluation of left foot ulcer  She had wound on left foot for about 2 months without healing  She was seen at the wound center  Wound has been worse in the last few weeks  She has severe pain in left foot with pressure  It is draining  She can only wear soft slipper due to pain    No increased redness or swelling  No history of diabetes  No known PAD  The following portions of the patient's history were reviewed and updated as appropriate: allergies, current medications, past family history, past medical history, past social history, past surgical history and problem list       PAST MEDICAL HISTORY:  Past Medical History:   Diagnosis Date    Acute blood loss anemia 9/9/2020    Aftercare following joint replacement 9/9/2020    Patient had right reversed total shoulder arthroplasty   Pain was controlled when he left the hospital       Anxiety     Arthritis     Depression     Disease of thyroid gland     HYPO    Femur neck fracture (HCC)     GERD (gastroesophageal reflux disease)     Hyperthyroidism     RESOLVED: [id number]    Osteoporosis     Polio     PVD [facility]peripheral vascular disease) ([facility] [address] )     Right BBB/left ant fasc block     UTI (urinary tract infection)     Varicella infection     LAST ASSESSED: [id number]       PAST SURGICAL HISTORY:  Past Surgical History:   Procedure Laterality Date    APPENDECTOMY      HIP ARTHROPLASTY Left 11/27/2017    Procedure: REMOVAL IM NAIL CONVERSION TO TOTAL HIP ARTHROPLASTY POSTERIOR APPROACH;  Surgeon: Rock Harman MD;  [facility];  Service: Orthopedics    HIP FRACTURE SURGERY      HIP SURGERY      both hips replaced;2013 left ,2014 right    JOINT REPLACEMENT Right     HIP TOTAL    NE CONV PREV HIP SURG TO TOT HIP Leah Downer Left 10/4/2017    Procedure: Rogelio Reynolds removal of left hip;  Surgeon: Rock Harman MD;  [facility];  Service: Orthopedics    NE RECONSTR TOTAL SHOULDER IMPLANT Right 9/2/2020    Procedure: ARTHROPLASTY SHOULDER REVERSE;  Surgeon: Jens Raymundo MD;  [facility];  Service: Orthopedics    NE [address] IMPLANT Left 6/1/2021    Procedure: ARTHROPLASTY SHOULDER REVERSE;  Surgeon: Jens Raymundo MD;  [facility];  Service: Orthopedics    NE [facility] Bety Mason LIG Right 5/24/2022    Procedure: RELEASE CARPAL TUNNEL ENDOSCOPIC-right;  Surgeon: Mignon Quintana MD;  [facility];  Service: Orthopedics    REVISION TOTAL HIP ARTHROPLASTY Right     TONSILLECTOMY          ALLERGIES:  Patient has no known allergies  MEDICATIONS:  Current Outpatient Medications   Medication Sig Dispense Refill    acetaminophen (TYLENOL) 650 mg CR tablet Take 1 tablet (650 mg total) by mouth every 8 (eight) hours as needed for mild pain (Patient not taking: No sig reported) 30 tablet 0    Ascorbic Acid, Vitamin C, (VITAMIN C) 100 MG tablet Take 400 mg by mouth daily      ferrous gluconate (FERGON) 240 (27 FE) MG tablet Take 1 tablet (240 mg total) by mouth in the morning 90 tablet 1    folic acid (FOLVITE) 1 mg tablet Take 2,000 mcg by mouth daily      hydroxychloroquine (PLAQUENIL) 200 mg tablet 1 pill am 1/2 pill pm      levothyroxine 25 mcg tablet Take 1 tablet (25 mcg total) by mouth daily 90 tablet 1    meloxicam (Mobic) 7 5 mg tablet Take 1 tablet (7 5 mg total) by mouth daily 30 tablet 0    methocarbamol (ROBAXIN) 750 mg tablet Take 1 tablet (750 mg total) by mouth 2 (two) times a dayas needed for muscle spasms 30 tablet 0    methotrexate 2 5 MG tablet 3 tablets by mouth once a week on Thursday      Multiple Vitamins-Minerals (CENTRUM SILVER PO) Take 1 tablet by mouth daily      nortriptyline (PAMELOR) 50 mg capsule Take 2 capsules (100 mg total) by mouth daily at bedtime 90 capsule 1    predniSONE 5 mg tablet Take 5 mg by mouth daily      rOPINIRole (REQUIP) 2 mg tablet Take 1 tablet (2 mg total) by mouth daily at bedtime Take 1 tablet by mouth daily at bedtime 90 tablet 1     No current facility-administered medications for this visit         SOCIAL HISTORY:  Social History     Socioeconomic History    Marital status:      Spouse name: None    Number of children: 1    Years of education: None    Highest education level: None   Occupational History    Occupation: RETIRED    Tobacco Use    Smoking status: Former Smoker    Smokeless tobacco: Never Used    Tobacco comment: quit 20-30 years ago   Vaping Use    Vaping Use: Never used   Substance and Sexual Activity    Alcohol use: Not Currently    Drug use: Not Currently    Sexual activity: Not Currently   Other Topics Concern    None   Social History Narrative    Always uses seat belt    Caffeine use    Worship    Single as per all scripts      Social Determinants of Health     Financial Resource Strain: Not on file   Food Insecurity: Not on file   Transportation Needs: Not on file   Physical Activity: Not on file   Stress: Not on file   Social Connections: Not on file   Intimate Partner Violence: Not on file   Housing Stability: Not on file      Review of Systems   Constitutional: Negative for appetite change, chills and fever  HENT: Negative for sore throat  Respiratory: Negative for cough and shortness of breath  Cardiovascular: Positive for leg swelling  Negative for chest pain  Gastrointestinal: Negative for diarrhea, nausea and vomiting  Musculoskeletal: Positive for gait problem  Skin: Positive for wound  Objective:       Wound 06/14/22 Pressure Injury Foot Left;Plantar (Active)   Wound Image Images linked 06/30/22 1030   Wound Description Pink;Yellow;Slough;Probes to bone 06/30/22 1030   Tiffani-wound Assessment Callus; Maceration 06/30/22 1030   Wound Length (cm) 1 3 cm 06/30/22 1030   Wound Width (cm) 2 cm 06/30/22 1030   Wound Depth (cm) 0 4 cm 06/30/22 1030   Wound Surface Area (cm^2) 2 6 cm^2 06/30/22 1030   Wound Volume (cm^3) 1 04 cm^3 06/30/22 1030   Calculated Wound Volume (cm^3) 1 04 cm^3 06/30/22 1030   Change in Wound Size % 30 67 06/30/22 1030   Undermining 0 7 06/30/22 1030   Undermining is depth extending from 1-3 oclock 06/30/22 1030   Drainage Amount Moderate 06/30/22 1030   Drainage Description Serosanguineous; Yellow 06/30/22 1030   Non-staged Wound Description Full thickness 06/30/22 1030       BP (!) 173/83   Pulse 81   Temp 98 °F (36 7 °C)   Resp 16     Physical Exam  Vitals reviewed  Constitutional:       General: She is not in acute distress  Appearance: She is not toxic-appearing or diaphoretic  HENT:      Head: Normocephalic and atraumatic  Eyes:      Extraocular Movements: Extraocular movements intact  Cardiovascular:      Rate and Rhythm: Normal rate and regular rhythm  Pulses:           Dorsalis pedis pulses are 0 on the right side and 0 on the left side  Posterior tibial pulses are 1+ on the right side and 0 on the left side  Comments: No gangrene  Pulmonary:      Effort: Pulmonary effort is normal  No respiratory distress  Musculoskeletal:         General: No signs of injury  Cervical back: Normal range of motion and neck supple  Right lower leg: Edema present  Left lower leg: Edema present  Right foot: No foot drop  Left foot: No foot drop  Feet:      Right foot:      Skin integrity: Dry skin present  Left foot:      Skin integrity: Dry skin present  Skin:     General: Skin is warm  Capillary Refill: Capillary refill takes less than 2 seconds  Coloration: Skin is not cyanotic  Findings: Wound present  No abscess  Nails: There is no clubbing  Comments: Wound presents left submet 5  Wound probes to capsular area left 5th MPJ  Wound bed is mildly hypergranular  No pus  No cellulitis  See wound assessment  Neurological:      General: No focal deficit present  Mental Status: She is alert and oriented to person, place, and time  Cranial Nerves: No cranial nerve deficit  Sensory: No sensory deficit  Psychiatric:         Mood and Affect: Mood normal          Behavior: Behavior normal          Thought Content:  Thought content normal          Judgment: Judgment normal            Wound Instructions:  Orders Placed This Encounter Procedures    Wound cleansing and dressings     Left foot wound:     May shower with protection or sponge bath at this time      Cleanse with mild soap and water  Pat dry  Apply silver alginate to wound  Cover with gauze, kkling and tape  Change dressing 3 times a week and as needed for excessive drainage/dislodgement      This was performed at wound care center today        Off-loading Instructions:     Keep weight and pressure off wound at all times  Use walker when walking  Surgical shoe to the left foot      MRI ordered for the left foot  Arterial Duplex ordered for the left foot     Standing Status:   Future     Standing Expiration Date:   6/30/2023    MRI foot/forefoot toes left wo contrast     Standing Status:   Future     Standing Expiration Date:   6/30/2026     Scheduling Instructions: There is no preparation for this test  Please leave your jewelry and valuables at home, wedding rings are the exception  Magnetic nail polish must be removed prior to arrival for your test  Please bring your insurance cards, a form of photo ID and a list of your medications with you  Arrive 15 minutes prior to your appointment time in order to register  Please bring any prior CT or MRI studies of this area that were not performed at a [facility]  To schedule this appointment, please contact Central Scheduling at [phone]  Prior to your appointment, please make sure you complete the MRI Screening Form when you e-Check in for your appointment  This will be available starting 7 days before your appointment in [address]  You may receive an e-mail with an activation code if you do not have a [employer] account  If you do not have access to a device, we will complete your screening at your appointment  Order Specific Question:   What is the patient's sedation requirement?      Answer:   Unknown     Order Specific Question:   Release to patient through [employer]     Answer:   Immediate Order Specific Question:   Is order priority selected as STAT? Answer:   Yes     Order Specific Question:   For OP exams needed within 48 hrs choose URGENT" and call Central Scheduling, no need to speak with Radiologist  For INPT/ ED STAT orders, contact Radiologist for approval at [phone] (opt  3) before signing order  Answer:   URGENT     Order Specific Question:   Reason for Exam (FREE TEXT)     Answer:   Non-healing ulcer left submet 5 to rule out chronic ostemyelitis        Diagnosis ICD-10-CM Associated Orders   1  Pressure injury of left foot, stage 4 ([facility])  [id number] lidocaine (LMX) 4 % cream     MRI foot/forefoot toes left wo contrast     VAS lower limb arterial duplex, complete bilateral     Wound cleansing and dressings   2   Peripheral vascular disease, unspecified ([facility])  I73 9 VAS lower limb arterial duplex, complete bilateral

## 2022-06-30 NOTE — PATIENT INSTRUCTIONS
Orders Placed This Encounter   Procedures    Wound cleansing and dressings     Left foot wound:     May shower with protection or sponge bath at this time      Cleanse with mild soap and water  Pat dry  Apply silver alginate to wound  Cover with gauze, kkling and tape  Change dressing 3 times a week and as needed for excessive drainage/dislodgement      This was performed at wound care center today        Off-loading Instructions:     Keep weight and pressure off wound at all times  Use walker when walking  Surgical shoe to the left foot      MRI ordered for the left foot  Arterial Duplex ordered for the left foot     Standing Status:   Future     Standing Expiration Date:   6/30/2023    MRI foot/forefoot toes left wo contrast     Standing Status:   Future     Standing Expiration Date:   6/30/2026     Scheduling Instructions: There is no preparation for this test  Please leave your jewelry and valuables at home, wedding rings are the exception  Magnetic nail polish must be removed prior to arrival for your test  Please bring your insurance cards, a form of photo ID and a list of your medications with you  Arrive 15 minutes prior to your appointment time in order to register  Please bring any prior CT or MRI studies of this area that were not performed at a [facility]  To schedule this appointment, please contact Central Scheduling at [phone]  Prior to your appointment, please make sure you complete the MRI Screening Form when you e-Check in for your appointment  This will be available starting 7 days before your appointment in [address]  You may receive an e-mail with an activation code if you do not have a [employer] account  If you do not have access to a device, we will complete your screening at your appointment  Order Specific Question:   What is the patient's sedation requirement?      Answer:   Unknown     Order Specific Question:   Release to patient through [address] [place] [address] [address] Answer:   Immediate     Order Specific Question:   Is order priority selected as STAT? Answer:   Yes     Order Specific Question:   For OP exams needed within 48 hrs choose URGENT" and call Central Scheduling, no need to speak with Radiologist  For INPT/ ED STAT orders, contact Radiologist for approval at [phone] (opt  3) before signing order       Answer:   URGENT     Order Specific Question:   Reason for Exam (FREE TEXT)     Answer:   Non-healing ulcer left submet 5 to rule out chronic ostemyelitis

## 2022-07-01 ENCOUNTER — HOSPITAL ENCOUNTER (OUTPATIENT)
Dept: RADIOLOGY | Facility: HOSPITAL | Age: 77
Discharge: HOME | End: 2022-07-01
Attending: PODIATRIST

## 2022-07-01 DIAGNOSIS — L89.894 PRESSURE INJURY OF LEFT FOOT, STAGE 4 (HCC): ICD-10-CM

## 2022-07-06 ENCOUNTER — OFFICE VISIT (OUTPATIENT)
Dept: OBGYN CLINIC | Facility: HOSPITAL | Age: 77
End: 2022-07-06
Payer: MEDICARE

## 2022-07-06 ENCOUNTER — HOSPITAL ENCOUNTER (OUTPATIENT)
Dept: RADIOLOGY | Facility: HOSPITAL | Age: 77
Discharge: HOME | End: 2022-07-06
Attending: ORTHOPAEDIC SURGERY
Payer: MEDICARE

## 2022-07-06 VITALS
DIASTOLIC BLOOD PRESSURE: 79 MMHG | WEIGHT: 97 LBS | BODY MASS INDEX: 20.36 KG/M2 | HEART RATE: 82 BPM | SYSTOLIC BLOOD PRESSURE: 124 MMHG | HEIGHT: 58 IN

## 2022-07-06 DIAGNOSIS — Z96.612 S/P REVERSE TOTAL SHOULDER ARTHROPLASTY, LEFT: Primary | ICD-10-CM

## 2022-07-06 DIAGNOSIS — M25.512 CHRONIC LEFT SHOULDER PAIN: ICD-10-CM

## 2022-07-06 DIAGNOSIS — S40.012A TRAUMATIC HEMATOMA OF LEFT SHOULDER, INITIAL ENCOUNTER: ICD-10-CM

## 2022-07-06 DIAGNOSIS — G89.29 CHRONIC LEFT SHOULDER PAIN: ICD-10-CM

## 2022-07-06 DIAGNOSIS — T84.84XA PAINFUL ORTHOPAEDIC HARDWARE (HCC): ICD-10-CM

## 2022-07-06 PROCEDURE — 99213 OFFICE O/P EST LOW 20 MIN: CPT | Performed by: ORTHOPAEDIC SURGERY

## 2022-07-06 PROCEDURE — 73030 X-RAY EXAM OF SHOULDER: CPT

## 2022-07-06 RX ORDER — CEPHALEXIN 500 MG/1
500 CAPSULE ORAL EVERY 12 HOURS SCHEDULED
Qty: 14 CAPSULE | Refills: 0 | Status: SHIPPED | OUTPATIENT
Start: 2022-07-06 | End: 2022-07-13

## 2022-07-06 NOTE — PROGRESS NOTES
Orthopaedics Office Visit -  Patient Visit    ASSESSMENT/PLAN:    Assessment:   Chronic anterior reverse shoulder replacement dislocation   Hematoma of anterior shoulder, unclear chronicity    Plan:   Discussed with patient she can seek 2nd opinion about removing hardware and doing resection arthroplasty which again may not give her any more function then she currently has  This will be at patient and family discretion  Recommended against aspirating shoulder as this can possible introduce infection into shoulder and fluid is mostly gelatinous in nature and may not get any fluid out  Keflex 500 mg BID 7 day course to phx as preventative   See patient back as needed  To Do Next Visit:  Keflex 500 mg 7 day course     _____________________________________________________  CHIEF COMPLAINT:  Chief Complaint   Patient presents with    Right Shoulder - Pain    Left Shoulder - Pain         SUBJECTIVE:  Jerrod Álvarez is a 65 y o  female who presents for evaluation of bilateral shoulders  History bilateral shoulder replacements, left chronically displaced after fall post operatively with no further surgical intervention  Right reverse by Dr Nancy Lowe 2020, left shoulder replacement 3-4yrs ago by Dr Nancy Lowe  She has fluid in shoulder which waxes and wanes and would like to know if this can be drained  It is painful as well, limited motion of shoulder  She saw Dr Nancy Lowe last August and discussed since glenoid base plate has failed given her traumatic injury and is amenable to removal however that would leave the patient with a resection arthroplasty  But he states this may not give patient any more function then she currently has  She does itch area and does have scratch over swollen area  Denies any fever,chills currently       PAST MEDICAL HISTORY:  Past Medical History:   Diagnosis Date    Acute blood loss anemia 9/9/2020    Aftercare following joint replacement 9/9/2020    Patient had right reversed total shoulder arthroplasty   Pain was controlled when he left the hospital       Anxiety     Arthritis     Depression     Disease of thyroid gland     HYPO    Femur neck fracture (HCC)     GERD (gastroesophageal reflux disease)     Hyperthyroidism     RESOLVED: [id number]    Osteoporosis     Polio     PVD (peripheral vascular disease) ([facility] [address] )     Right BBB/left ant fasc block     UTI (urinary tract infection)     Varicella infection     LAST ASSESSED: [id number]       PAST SURGICAL HISTORY:  Past Surgical History:   Procedure Laterality Date    APPENDECTOMY      HIP ARTHROPLASTY Left 11/27/2017    Procedure: REMOVAL IM NAIL CONVERSION TO TOTAL HIP ARTHROPLASTY POSTERIOR APPROACH;  Surgeon: Omero Barfield MD;  [facility];  Service: Orthopedics    HIP FRACTURE SURGERY      HIP SURGERY      both hips replaced;2013 left ,2014 right    JOINT REPLACEMENT Right     HIP TOTAL    OH CONV PREV HIP SURG TO TOT HIP [place] Left 10/4/2017    Procedure: Molly Lucia removal of left hip;  Surgeon: Omero Barfield MD;  [facility];  Service: Orthopedics    OH RECONSTR TOTAL SHOULDER IMPLANT Right 9/2/2020    Procedure: ARTHROPLASTY SHOULDER REVERSE;  Surgeon: Ansley Vasquez MD;  [facility];  Service: Orthopedics    OH RECONSTR TOTAL SHOULDER IMPLANT Left 6/1/2021    Procedure: ARTHROPLASTY SHOULDER REVERSE;  Surgeon: Ansley Vasquez MD;  [facility];  Service: Orthopedics    OH WRIST [place] LIG Right 5/24/2022    Procedure: RELEASE CARPAL TUNNEL ENDOSCOPIC-right;  Surgeon: Valencia Nix MD;  [facility];  Service: Orthopedics    REVISION TOTAL HIP ARTHROPLASTY Right     TONSILLECTOMY         FAMILY HISTORY:  Family History   Problem Relation Age of Onset    Rheum arthritis Mother     Rheum arthritis Sister     Prostate cancer Brother        SOCIAL HISTORY:  Social History     Tobacco Use    Smoking status: Former Smoker    Smokeless tobacco: Never Used    Tobacco comment: quit 20-30 years ago   Vaping Use    Vaping Use: Never used   Substance Use Topics    Alcohol use: Not Currently    Drug use: Not Currently       MEDICATIONS:    Current Outpatient Medications:     Ascorbic Acid, Vitamin C, (VITAMIN C) 100 MG tablet, Take 400 mg by mouth daily, Disp: , Rfl:     ferrous gluconate (FERGON) 240 (27 FE) MG tablet, Take 1 tablet (240 mg total) by mouth in the morning, Disp: 90 tablet, Rfl: 1    folic acid (FOLVITE) 1 mg tablet, Take 2,000 mcg by mouth daily, Disp: , Rfl:     hydroxychloroquine (PLAQUENIL) 200 mg tablet, 1 pill am 1/2 pill pm, Disp: , Rfl:     levothyroxine 25 mcg tablet, Take 1 tablet (25 mcg total) by mouth daily, Disp: 90 tablet, Rfl: 1    meloxicam (Mobic) 7 5 mg tablet, Take 1 tablet (7 5 mg total) by mouth daily, Disp: 30 tablet, Rfl: 0    methocarbamol (ROBAXIN) 750 mg tablet, Take 1 tablet (750 mg total) by mouth 2 (two) times a dayas needed for muscle spasms, Disp: 30 tablet, Rfl: 0    methotrexate 2 5 MG tablet, 3 tablets by mouth once a week on Thursday, Disp:  , Rfl:     Multiple Vitamins-Minerals (CENTRUM SILVER PO), Take 1 tablet by mouth daily, Disp: , Rfl:     nortriptyline (PAMELOR) 50 mg capsule, Take 2 capsules (100 mg total) by mouth daily at bedtime, Disp: 90 capsule, Rfl: 1    predniSONE 5 mg tablet, Take 5 mg by mouth daily, Disp: , Rfl:     rOPINIRole (REQUIP) 2 mg tablet, Take 1 tablet (2 mg total) by mouth daily at bedtime Take 1 tablet by mouth daily at bedtime, Disp: 90 tablet, Rfl: 1    acetaminophen (TYLENOL) 650 mg CR tablet, Take 1 tablet (650 mg total) by mouth every 8 (eight) hours as needed for mild pain (Patient not taking: No sig reported), Disp: 30 tablet, Rfl: 0    ALLERGIES:  No Known Allergies    REVIEW OF SYSTEMS:  MSK: left shoulder hematoma  Neuro: intact   Pertinent items are otherwise noted in HPI  A comprehensive review of systems was otherwise negative      LABS:  HgA1c:   Lab Results   Component Value Date    HGBA1C 5 3 05/11/2021     BMP:   Lab Results   Component Value Date    GLUCOSE 89 10/30/2014    CALCIUM 9 2 06/27/2022     (L) 10/30/2014    K 3 3 (L) 06/27/2022    CO2 22 06/27/2022     06/27/2022    BUN 17 06/27/2022    CREATININE 0 53 (L) 06/27/2022     CBC: No components found for: CBC    _____________________________________________________  PHYSICAL EXAMINATION:  Vital signs: Ht 4' 10" (1 473 m)   Wt 44 kg (97 lb)   BMI 20 27 kg/m²   General: No acute distress, awake and alert  Psychiatric: Mood and affect appear appropriate  HEENT: Trachea Midline, No torticollis, no apparent facial trauma  Cardiovascular: No audible murmurs;  Extremities appear perfused  Pulmonary: No audible wheezing or stridor  Skin: No open lesions; see further details (if any) below    MUSCULOSKELETAL EXAMINATION:  Extremities:  Left shoulder  Swelling anterior left shoulder consistent with hematoma with healing scratch abrasion by patients nails with no evidence of infection or drainage  Warmth to palpation   Limited active and passive motion of shoulder secondary to chronic dislocation  Full motion of elbow   Sensation intact   NVI       _____________________________________________________  STUDIES REVIEWED:  I personally reviewed the images and interpretation is as follows:  xr left shoulder demonstrates chronically anteriorly displaced prosthesis     PROCEDURES PERFORMED:  Procedures    Lesli Currie MD

## 2022-07-07 ENCOUNTER — OFFICE VISIT (OUTPATIENT)
Dept: WOUND CARE | Facility: HOSPITAL | Age: 77
End: 2022-07-07
Payer: MEDICARE

## 2022-07-07 VITALS
HEART RATE: 89 BPM | DIASTOLIC BLOOD PRESSURE: 74 MMHG | RESPIRATION RATE: 18 BRPM | SYSTOLIC BLOOD PRESSURE: 169 MMHG | TEMPERATURE: 97.5 F

## 2022-07-07 DIAGNOSIS — L89.894 PRESSURE INJURY OF LEFT FOOT, STAGE 4 (HCC): Primary | ICD-10-CM

## 2022-07-07 PROCEDURE — 99213 OFFICE O/P EST LOW 20 MIN: CPT | Performed by: PODIATRIST

## 2022-07-07 RX ORDER — LIDOCAINE 40 MG/G
CREAM TOPICAL ONCE
Status: COMPLETED | OUTPATIENT
Start: 2022-07-07 | End: 2022-07-07

## 2022-07-07 RX ADMIN — LIDOCAINE: 40 CREAM TOPICAL at 09:55

## 2022-07-07 NOTE — PROGRESS NOTES
Patient ID: Chandra Chahal is a 65 y o  female Date of Birth 1945     Chief Complaint  Chief Complaint   Patient presents with    Follow Up Wound Care Visit     Left foot wound       Allergies  Patient has no known allergies  Assessment:    No problem-specific Assessment & Plan notes found for this encounter  Diagnoses and all orders for this visit:    Pressure injury of left foot, stage 4 (HCC)  -     Wound cleansing and dressings; Future  -     Wound off loading; Future  -     lidocaine (LMX) 4 % cream  -     NM ceretec abscess localization limited; Future          Procedures    Plan:  1  Reviewed the previous wound care note and photo  2  Patient was counseled and educated on the condition and the diagnosis  3  MRI reviewed  It is incomplete due to her motion  Will order Ceretec scan  4  Awaits arterial study  5  Local care with silver alginate  Wound 06/14/22 Pressure Injury Foot Left;Plantar (Active)   Wound Image Images linked 07/07/22 0914   Wound Description Pink;Yellow;Slough 07/07/22 0914   Tiffani-wound Assessment Callus; Maceration 07/07/22 0914   Wound Length (cm) 1 7 cm 07/07/22 0914   Wound Width (cm) 2 4 cm 07/07/22 0914   Wound Depth (cm) 0 5 cm 07/07/22 0914   Wound Surface Area (cm^2) 4 08 cm^2 07/07/22 0914   Wound Volume (cm^3) 2 04 cm^3 07/07/22 0914   Calculated Wound Volume (cm^3) 2 04 cm^3 07/07/22 0914   Change in Wound Size % -36 07/07/22 0914   Undermining 0 2 07/07/22 0914   Undermining is depth extending from 1-3 oclock 07/07/22 0914   Drainage Amount Moderate 07/07/22 0914   Drainage Description Serosanguineous 07/07/22 0914   Non-staged Wound Description Full thickness 07/07/22 0914   Treatments Irrigation with NSS 07/07/22 0914       Wound 06/14/22 Pressure Injury Foot Left;Plantar (Active)   Date First Assessed/Time First Assessed: 06/14/22 1308   Primary Wound Type: Pressure Injury  Location: Foot  Wound Location Orientation: Left;Plantar  Wound Description (Comments): stage 3       [REMOVED] Wound 09/02/20 Shoulder Right (Removed)   Resolved Date: 06/14/22  Date First Assessed/Time First Assessed: 09/02/20 1147   Location: Shoulder  Wound Location Orientation: Right  Wound Description (Comments): pillow sling applied  Incision's 1st Dressing: DRESSING MEPILEX AG BORDER 4 X 8 IN (  [REMOVED] Wound 06/01/21 Shoulder Left (Removed)   Resolved Date: 06/14/22  Date First Assessed/Time First Assessed: 06/01/21 1157   Location: Shoulder  Wound Location Orientation: Left  Wound Description (Comments): staples  pillow sling  Incision's 1st Dressing: DRESSING MEPILEX AG BORDER 4 X 8 IN      [REMOVED] Wound 05/24/22 Wrist Right (Removed)   Resolved Date: 06/14/22  Date First Assessed/Time First Assessed: 05/24/22 1005   Location: Wrist  Wound Location Orientation: Right  Incision's 1st Dressing: DRESSING XEROFORM 1 X 8 (x1), SPONGE GAUZE 4 X 4 16 PLY STRL PLASTIC TRAY LF (x1), BANDAGE K  [REMOVED] Wound 05/24/22 Hand Right (Removed)   Resolved Date: 06/14/22  Date First Assessed/Time First Assessed: 05/24/22 1005   Location: Hand  Wound Location Orientation: Right  Wound Outcome: Unknown (No longer present)         Subjective:      LUISA Arreola presents for left foot wound  She has painful wound left foot  No increased pain  She has difficulty walking due to pain  No increased redness or swelling  The following portions of the patient's history were reviewed and updated as appropriate: allergies, current medications, past family history, past medical history, past social history, past surgical history and problem list       PAST MEDICAL HISTORY:  Past Medical History:   Diagnosis Date    Acute blood loss anemia 9/9/2020    Aftercare following joint replacement 9/9/2020    Patient had right reversed total shoulder arthroplasty   Pain was controlled when he left the hospital       Anxiety     Arthritis     Depression     Disease of thyroid gland     HYPO    Femur neck fracture (HCC)     GERD (gastroesophageal reflux disease)     Hyperthyroidism     RESOLVED: [id number]    Osteoporosis     Polio     PVD [facility]peripheral vascular disease) ([facility] [address]     Right BBB/left ant fasc block     UTI (urinary tract infection)     Varicella infection     LAST ASSESSED: [id number]       PAST SURGICAL HISTORY:  Past Surgical History:   Procedure Laterality Date    APPENDECTOMY      HIP ARTHROPLASTY Left 11/27/2017    Procedure: REMOVAL IM NAIL CONVERSION TO TOTAL HIP ARTHROPLASTY POSTERIOR APPROACH;  Surgeon: Bobby Epps MD;  [facility];  Service: Orthopedics    HIP FRACTURE SURGERY      HIP SURGERY      both hips replaced;2013 left ,2014 right    JOINT REPLACEMENT Right     HIP TOTAL    OH CONV PREV HIP SURG TO TOT HIP ARTHROPLAS Left 10/4/2017    Procedure: [place] removal of left hip;  Surgeon: Bobby Epps MD;  [facility];  Service: Orthopedics    OH RECONSTR TOTAL SHOULDER IMPLANT Right 9/2/2020    Procedure: ARTHROPLASTY SHOULDER REVERSE;  Surgeon: Khai Hwang MD;  [facility];  Service: Orthopedics    OH RECONSTR TOTAL SHOULDER IMPLANT Left 6/1/2021    Procedure: ARTHROPLASTY SHOULDER REVERSE;  Surgeon: Khai Hwang MD;  [facility];  Service: Orthopedics    OH WRIST [place] LIG Right 5/24/2022    Procedure: RELEASE CARPAL TUNNEL ENDOSCOPIC-right;  Surgeon: Faustina Guzman MD;  [facility];  Service: Orthopedics    REVISION TOTAL HIP ARTHROPLASTY Right     TONSILLECTOMY          ALLERGIES:  Patient has no known allergies      MEDICATIONS:  Current Outpatient Medications   Medication Sig Dispense Refill    acetaminophen (TYLENOL) 650 mg CR tablet Take 1 tablet (650 mg total) by mouth every 8 (eight) hours as needed for mild pain (Patient not taking: No sig reported) 30 tablet 0    Ascorbic Acid, Vitamin C, (VITAMIN C) 100 MG tablet Take 400 mg by mouth daily  cephalexin (KEFLEX) 500 mg capsule Take 1 capsule (500 mg total) by mouth every 12 (twelve) hours for 7 days 14 capsule 0    ferrous gluconate (FERGON) 240 (27 FE) MG tablet Take 1 tablet (240 mg total) by mouth in the morning 90 tablet 1    folic acid (FOLVITE) 1 mg tablet Take 2,000 mcg by mouth daily      hydroxychloroquine (PLAQUENIL) 200 mg tablet 1 pill am 1/2 pill pm      levothyroxine 25 mcg tablet Take 1 tablet (25 mcg total) by mouth daily 90 tablet 1    meloxicam (Mobic) 7 5 mg tablet Take 1 tablet (7 5 mg total) by mouth daily 30 tablet 0    methocarbamol (ROBAXIN) 750 mg tablet Take 1 tablet (750 mg total) by mouth 2 (two) times a dayas needed for muscle spasms 30 tablet 0    methotrexate 2 5 MG tablet 3 tablets by mouth once a week on Thursday      Multiple Vitamins-Minerals (CENTRUM SILVER PO) Take 1 tablet by mouth daily      nortriptyline (PAMELOR) 50 mg capsule Take 2 capsules (100 mg total) by mouth daily at bedtime 90 capsule 1    predniSONE 5 mg tablet Take 5 mg by mouth daily      rOPINIRole (REQUIP) 2 mg tablet Take 1 tablet (2 mg total) by mouth daily at bedtime Take 1 tablet by mouth daily at bedtime 90 tablet 1     No current facility-administered medications for this visit         SOCIAL HISTORY:  Social History     Socioeconomic History    Marital status:      Spouse name: None    Number of children: 1    Years of education: None    Highest education level: None   Occupational History    Occupation: RETIRED    Tobacco Use    Smoking status: Former Smoker    Smokeless tobacco: Never Used    Tobacco comment: quit 20-30 years ago   Vaping Use    Vaping Use: Never used   Substance and Sexual Activity    Alcohol use: Not Currently    Drug use: Not Currently    Sexual activity: Not Currently   Other Topics Concern    None   Social History Narrative    Always uses seat belt    Caffeine use    Christian    Single as per all scripts      Social Determinants of Health     Financial Resource Strain: Not on file   Food Insecurity: Not on file   Transportation Needs: Not on file   Physical Activity: Not on file   Stress: Not on file   Social Connections: Not on file   Intimate Partner Violence: Not on file   Housing Stability: Not on file      Review of Systems   Constitutional: Negative for appetite change, chills and fever  Respiratory: Negative for cough and shortness of breath  Cardiovascular: Positive for leg swelling  Negative for chest pain  Gastrointestinal: Negative for diarrhea, nausea and vomiting  Musculoskeletal: Positive for gait problem  Skin: Positive for wound  Objective:       Wound 06/14/22 Pressure Injury Foot Left;Plantar (Active)   Wound Image Images linked 07/07/22 0914   Wound Description Pink;Yellow;Slough 07/07/22 0914   Tiffani-wound Assessment Callus; Maceration 07/07/22 0914   Wound Length (cm) 1 7 cm 07/07/22 0914   Wound Width (cm) 2 4 cm 07/07/22 0914   Wound Depth (cm) 0 5 cm 07/07/22 0914   Wound Surface Area (cm^2) 4 08 cm^2 07/07/22 0914   Wound Volume (cm^3) 2 04 cm^3 07/07/22 0914   Calculated Wound Volume (cm^3) 2 04 cm^3 07/07/22 0914   Change in Wound Size % -36 07/07/22 0914   Undermining 0 2 07/07/22 0914   Undermining is depth extending from 1-3 oclock 07/07/22 0914   Drainage Amount Moderate 07/07/22 0914   Drainage Description Serosanguineous 07/07/22 0914   Non-staged Wound Description Full thickness 07/07/22 0914   Treatments Irrigation with NSS 07/07/22 0914       /74   Pulse 89   Temp 97 5 °F (36 4 °C)   Resp 18     Physical Exam  Vitals and nursing note reviewed  Constitutional:       General: She is not in acute distress  Appearance: She is not toxic-appearing or diaphoretic  Cardiovascular:      Rate and Rhythm: Normal rate and regular rhythm  Pulses:           Dorsalis pedis pulses are 0 on the right side and 0 on the left side          Posterior tibial pulses are 1+ on the right side and 0 on the left side  Comments: No gangrene  Pulmonary:      Effort: Pulmonary effort is normal  No respiratory distress  Musculoskeletal:         General: No signs of injury  Right lower leg: Edema present  Left lower leg: Edema present  Right foot: No foot drop  Left foot: No foot drop  Feet:      Right foot:      Skin integrity: Dry skin present  Left foot:      Skin integrity: Dry skin present  Skin:     General: Skin is warm  Capillary Refill: Capillary refill takes less than 2 seconds  Coloration: Skin is not cyanotic  Findings: Wound present  No abscess  Nails: There is no clubbing  Comments: Wound presents left submet 5  Wound probes to capsular area left 5th MPJ  No pus  No cellulitis  See wound assessment  Neurological:      General: No focal deficit present  Mental Status: She is alert and oriented to person, place, and time  Cranial Nerves: No cranial nerve deficit  Sensory: No sensory deficit  Psychiatric:         Mood and Affect: Mood normal          Behavior: Behavior normal          Thought Content: Thought content normal          Judgment: Judgment normal            Wound Instructions:  Orders Placed This Encounter   Procedures    Wound cleansing and dressings     Wound cleansing and dressings       Left foot wound:     May shower with protection or sponge bath at this time      Cleanse with mild soap and water  Pat dry  Apply silver alginate to wound  Cover with gauze, kkling and tape  Change dressing 3 times a week and as needed for excessive drainage/dislodgement      This was performed at wound care center today        Bone scan will be ordered for left foot  Arterial Duplex ordered for the left foot-- to be performed 7/15/22     Standing Status:   Future     Standing Expiration Date:   7/7/2023    Wound off loading     Off-loading Instructions:     Keep weight and pressure off wound at all times   Use walker when walking  Surgical shoe to the left foot     Standing Status:   Future     Standing Expiration Date:   7/7/2023    NM ceretec abscess localization limited     Cerete scan including 23 hour images  Non-healing left submet 5th met head to rule out chronic osteomyelitis  Patient could not tolerate MRI  Standing Status:   Future     Standing Expiration Date:   8/7/2022     Scheduling Instructions:      Please bring your insurance cards, a form of photo ID and a list of your medications with you  Arrive 15 minutes prior to your appointment time in order to register  To schedule this appointment, please contact Central Scheduling at [phone]  Order Specific Question:   Reason for Exam (FREE TEXT)     Answer:   Non-healing left submet 5th met head to rule out chronic osteomyelitis  Patient could not tolerate MRI  Diagnosis ICD-10-CM Associated Orders   1   Pressure injury of left foot, stage 4 ([facility])  [id number] Wound cleansing and dressings     Wound off loading     lidocaine (LMX) 4 % cream     NM ceretec abscess localization limited

## 2022-07-07 NOTE — PATIENT INSTRUCTIONS
Orders Placed This Encounter   Procedures    Wound cleansing and dressings     Wound cleansing and dressings       Left foot wound:     May shower with protection or sponge bath at this time      Cleanse with mild soap and water  Pat dry  Apply silver alginate to wound  Cover with gauze, kkling and tape  Change dressing 3 times a week and as needed for excessive drainage/dislodgement      This was performed at wound care center today        Bone scan will be ordered for left foot  Arterial Duplex ordered for the left foot-- to be performed 7/15/22     Standing Status:   Future     Standing Expiration Date:   7/7/2023    Wound off loading     Off-loading Instructions:     Keep weight and pressure off wound at all times   Use walker when walking  Surgical shoe to the left foot     Standing Status:   Future     Standing Expiration Date:   7/7/2023

## 2022-07-15 ENCOUNTER — HOSPITAL ENCOUNTER (OUTPATIENT)
Dept: NON INVASIVE DIAGNOSTICS | Facility: CLINIC | Age: 77
Discharge: HOME | End: 2022-07-15
Payer: MEDICARE

## 2022-07-15 DIAGNOSIS — I73.9 PERIPHERAL VASCULAR DISEASE, UNSPECIFIED (HCC): ICD-10-CM

## 2022-07-15 DIAGNOSIS — L89.894 PRESSURE INJURY OF LEFT FOOT, STAGE 4 (HCC): ICD-10-CM

## 2022-07-15 PROCEDURE — 93923 UPR/LXTR ART STDY 3+ LVLS: CPT

## 2022-07-15 PROCEDURE — 93922 UPR/L XTREMITY ART 2 LEVELS: CPT | Performed by: SURGERY

## 2022-07-15 PROCEDURE — 93925 LOWER EXTREMITY STUDY: CPT

## 2022-07-15 PROCEDURE — 93925 LOWER EXTREMITY STUDY: CPT | Performed by: SURGERY

## 2022-07-21 ENCOUNTER — OFFICE VISIT (OUTPATIENT)
Dept: WOUND CARE | Facility: HOSPITAL | Age: 77
DRG: 464 | End: 2022-07-21
Payer: MEDICARE

## 2022-07-21 ENCOUNTER — HOSPITAL ENCOUNTER (INPATIENT)
Facility: HOSPITAL | Age: 77
LOS: 13 days | Discharge: NURSING, REHAB OR LONG-TERM CARE | DRG: 464 | End: 2022-08-03
Attending: PODIATRIST | Admitting: PODIATRIST
Payer: MEDICARE

## 2022-07-21 ENCOUNTER — APPOINTMENT (INPATIENT)
Dept: RADIOLOGY | Facility: HOSPITAL | Age: 77
DRG: 464 | End: 2022-07-21
Payer: MEDICARE

## 2022-07-21 ENCOUNTER — TELEPHONE (OUTPATIENT)
Dept: FAMILY MEDICINE CLINIC | Facility: CLINIC | Age: 77
End: 2022-07-21

## 2022-07-21 VITALS
RESPIRATION RATE: 20 BRPM | HEART RATE: 81 BPM | SYSTOLIC BLOOD PRESSURE: 169 MMHG | TEMPERATURE: 97.7 F | DIASTOLIC BLOOD PRESSURE: 74 MMHG

## 2022-07-21 DIAGNOSIS — L89.894 PRESSURE INJURY OF LEFT FOOT, STAGE 4 (HCC): ICD-10-CM

## 2022-07-21 DIAGNOSIS — I87.312 CHRONIC VENOUS HYPERTENSION (IDIOPATHIC) WITH ULCER OF LEFT LOWER EXTREMITY (CODE) (HCC): ICD-10-CM

## 2022-07-21 DIAGNOSIS — M86.9 OSTEOMYELITIS OF LEFT FOOT, UNSPECIFIED TYPE (HCC): ICD-10-CM

## 2022-07-21 DIAGNOSIS — E87.1 HYPONATREMIA: ICD-10-CM

## 2022-07-21 DIAGNOSIS — E06.3 HYPOTHYROIDISM DUE TO HASHIMOTO'S THYROIDITIS: ICD-10-CM

## 2022-07-21 DIAGNOSIS — E03.8 HYPOTHYROIDISM DUE TO HASHIMOTO'S THYROIDITIS: ICD-10-CM

## 2022-07-21 DIAGNOSIS — M86.8X7 OTHER OSTEOMYELITIS OF LEFT FOOT (HCC): Primary | ICD-10-CM

## 2022-07-21 DIAGNOSIS — D50.9 IRON DEFICIENCY ANEMIA, UNSPECIFIED IRON DEFICIENCY ANEMIA TYPE: ICD-10-CM

## 2022-07-21 DIAGNOSIS — G89.4 CHRONIC PAIN SYNDROME: ICD-10-CM

## 2022-07-21 DIAGNOSIS — M86.172 ACUTE OSTEOMYELITIS OF LEFT FOOT (HCC): Primary | ICD-10-CM

## 2022-07-21 PROBLEM — M86.072 ACUTE HEMATOGENOUS OSTEOMYELITIS OF LEFT FOOT (HCC): Status: ACTIVE | Noted: 2022-07-21

## 2022-07-21 LAB
ALBUMIN SERPL BCP-MCNC: 2.8 G/DL (ref 3.5–5)
ALP SERPL-CCNC: 85 U/L (ref 46–116)
ALT SERPL W P-5'-P-CCNC: 25 U/L (ref 12–78)
ANION GAP SERPL CALCULATED.3IONS-SCNC: 7 MMOL/L (ref 4–13)
AST SERPL W P-5'-P-CCNC: 34 U/L (ref 5–45)
BASOPHILS # BLD AUTO: 0.02 THOUSANDS/ΜL (ref 0–0.1)
BASOPHILS NFR BLD AUTO: 0 % (ref 0–1)
BILIRUB SERPL-MCNC: 0.37 MG/DL (ref 0.2–1)
BUN SERPL-MCNC: 8 MG/DL (ref 5–25)
CALCIUM ALBUM COR SERPL-MCNC: 9.8 MG/DL (ref 8.3–10.1)
CALCIUM SERPL-MCNC: 8.8 MG/DL (ref 8.3–10.1)
CHLORIDE SERPL-SCNC: 108 MMOL/L (ref 96–108)
CO2 SERPL-SCNC: 25 MMOL/L (ref 21–32)
CREAT SERPL-MCNC: 0.45 MG/DL (ref 0.6–1.3)
EOSINOPHIL # BLD AUTO: 0.11 THOUSAND/ΜL (ref 0–0.61)
EOSINOPHIL NFR BLD AUTO: 1 % (ref 0–6)
ERYTHROCYTE [DISTWIDTH] IN BLOOD BY AUTOMATED COUNT: 17.6 % (ref 11.6–15.1)
GFR SERPL CREATININE-BSD FRML MDRD: 96 ML/MIN/1.73SQ M
GLUCOSE SERPL-MCNC: 83 MG/DL (ref 65–140)
HCT VFR BLD AUTO: 32 % (ref 34.8–46.1)
HGB BLD-MCNC: 9.9 G/DL (ref 11.5–15.4)
IMM GRANULOCYTES # BLD AUTO: 0.03 THOUSAND/UL (ref 0–0.2)
IMM GRANULOCYTES NFR BLD AUTO: 0 % (ref 0–2)
LYMPHOCYTES # BLD AUTO: 1.47 THOUSANDS/ΜL (ref 0.6–4.47)
LYMPHOCYTES NFR BLD AUTO: 18 % (ref 14–44)
MCH RBC QN AUTO: 27.5 PG (ref 26.8–34.3)
MCHC RBC AUTO-ENTMCNC: 30.9 G/DL (ref 31.4–37.4)
MCV RBC AUTO: 89 FL (ref 82–98)
MONOCYTES # BLD AUTO: 0.71 THOUSAND/ΜL (ref 0.17–1.22)
MONOCYTES NFR BLD AUTO: 9 % (ref 4–12)
NEUTROPHILS # BLD AUTO: 5.65 THOUSANDS/ΜL (ref 1.85–7.62)
NEUTS SEG NFR BLD AUTO: 72 % (ref 43–75)
NRBC BLD AUTO-RTO: 0 /100 WBCS
PLATELET # BLD AUTO: 463 THOUSANDS/UL (ref 149–390)
PMV BLD AUTO: 8.4 FL (ref 8.9–12.7)
POTASSIUM SERPL-SCNC: 3.3 MMOL/L (ref 3.5–5.3)
PROT SERPL-MCNC: 6.7 G/DL (ref 6.4–8.4)
RBC # BLD AUTO: 3.6 MILLION/UL (ref 3.81–5.12)
SODIUM SERPL-SCNC: 140 MMOL/L (ref 135–147)
WBC # BLD AUTO: 7.99 THOUSAND/UL (ref 4.31–10.16)

## 2022-07-21 PROCEDURE — NC001 PR NO CHARGE: Performed by: PODIATRIST

## 2022-07-21 PROCEDURE — 87205 SMEAR GRAM STAIN: CPT

## 2022-07-21 PROCEDURE — 80053 COMPREHEN METABOLIC PANEL: CPT

## 2022-07-21 PROCEDURE — 87186 SC STD MICRODIL/AGAR DIL: CPT

## 2022-07-21 PROCEDURE — 85025 COMPLETE CBC W/AUTO DIFF WBC: CPT

## 2022-07-21 PROCEDURE — 73630 X-RAY EXAM OF FOOT: CPT

## 2022-07-21 PROCEDURE — 87077 CULTURE AEROBIC IDENTIFY: CPT

## 2022-07-21 PROCEDURE — 99213 OFFICE O/P EST LOW 20 MIN: CPT | Performed by: PODIATRIST

## 2022-07-21 PROCEDURE — 99232 SBSQ HOSP IP/OBS MODERATE 35: CPT | Performed by: PODIATRIST

## 2022-07-21 PROCEDURE — 87070 CULTURE OTHR SPECIMN AEROBIC: CPT

## 2022-07-21 RX ORDER — METHOCARBAMOL 750 MG/1
750 TABLET, FILM COATED ORAL EVERY 6 HOURS PRN
Status: DISCONTINUED | OUTPATIENT
Start: 2022-07-21 | End: 2022-08-03 | Stop reason: HOSPADM

## 2022-07-21 RX ORDER — MULTIVIT WITH MINERALS/LUTEIN
400 TABLET ORAL DAILY
Status: DISCONTINUED | OUTPATIENT
Start: 2022-07-22 | End: 2022-08-03 | Stop reason: HOSPADM

## 2022-07-21 RX ORDER — PREDNISONE 1 MG/1
5 TABLET ORAL DAILY
Status: DISCONTINUED | OUTPATIENT
Start: 2022-07-22 | End: 2022-08-03 | Stop reason: HOSPADM

## 2022-07-21 RX ORDER — OXYCODONE HYDROCHLORIDE AND ACETAMINOPHEN 5; 325 MG/1; MG/1
1 TABLET ORAL EVERY 6 HOURS PRN
Status: COMPLETED | OUTPATIENT
Start: 2022-07-21 | End: 2022-07-21

## 2022-07-21 RX ORDER — NORTRIPTYLINE HYDROCHLORIDE 50 MG/1
100 CAPSULE ORAL
Status: DISCONTINUED | OUTPATIENT
Start: 2022-07-21 | End: 2022-08-03 | Stop reason: HOSPADM

## 2022-07-21 RX ORDER — FOLIC ACID 1 MG/1
2000 TABLET ORAL DAILY
Status: DISCONTINUED | OUTPATIENT
Start: 2022-07-22 | End: 2022-08-03 | Stop reason: HOSPADM

## 2022-07-21 RX ORDER — LEVOTHYROXINE SODIUM 0.03 MG/1
25 TABLET ORAL DAILY
Status: DISCONTINUED | OUTPATIENT
Start: 2022-07-22 | End: 2022-08-02

## 2022-07-21 RX ORDER — CEFAZOLIN SODIUM 2 G/50ML
2000 SOLUTION INTRAVENOUS EVERY 8 HOURS
Status: DISCONTINUED | OUTPATIENT
Start: 2022-07-21 | End: 2022-07-25

## 2022-07-21 RX ORDER — LIDOCAINE 40 MG/G
CREAM TOPICAL ONCE
Status: DISCONTINUED | OUTPATIENT
Start: 2022-07-21 | End: 2022-07-21

## 2022-07-21 RX ORDER — DOXYCYCLINE HYCLATE 50 MG/1
324 CAPSULE, GELATIN COATED ORAL DAILY
Status: DISCONTINUED | OUTPATIENT
Start: 2022-07-21 | End: 2022-08-03 | Stop reason: HOSPADM

## 2022-07-21 RX ORDER — ROPINIROLE 1 MG/1
2 TABLET, FILM COATED ORAL
Status: DISCONTINUED | OUTPATIENT
Start: 2022-07-21 | End: 2022-08-03 | Stop reason: HOSPADM

## 2022-07-21 RX ORDER — HYDROXYCHLOROQUINE SULFATE 200 MG/1
200 TABLET, FILM COATED ORAL
Status: DISCONTINUED | OUTPATIENT
Start: 2022-07-22 | End: 2022-08-03 | Stop reason: HOSPADM

## 2022-07-21 RX ORDER — ACETAMINOPHEN 325 MG/1
325 TABLET ORAL EVERY 6 HOURS PRN
Refills: 0 | Status: DISCONTINUED | OUTPATIENT
Start: 2022-07-21 | End: 2022-07-24

## 2022-07-21 RX ORDER — MELOXICAM 7.5 MG/1
7.5 TABLET ORAL DAILY
Status: DISCONTINUED | OUTPATIENT
Start: 2022-07-22 | End: 2022-08-03 | Stop reason: HOSPADM

## 2022-07-21 RX ORDER — HEPARIN SODIUM 5000 [USP'U]/ML
5000 INJECTION, SOLUTION INTRAVENOUS; SUBCUTANEOUS EVERY 8 HOURS SCHEDULED
Status: DISCONTINUED | OUTPATIENT
Start: 2022-07-21 | End: 2022-08-03 | Stop reason: HOSPADM

## 2022-07-21 RX ORDER — LIDOCAINE 40 MG/G
CREAM TOPICAL ONCE
Status: COMPLETED | OUTPATIENT
Start: 2022-07-21 | End: 2022-07-21

## 2022-07-21 RX ADMIN — ROPINIROLE HYDROCHLORIDE 2 MG: 1 TABLET, FILM COATED ORAL at 23:05

## 2022-07-21 RX ADMIN — METHOCARBAMOL TABLETS 750 MG: 750 TABLET, COATED ORAL at 20:46

## 2022-07-21 RX ADMIN — OXYCODONE HYDROCHLORIDE AND ACETAMINOPHEN 1 TABLET: 5; 325 TABLET ORAL at 20:46

## 2022-07-21 RX ADMIN — LIDOCAINE 1 APPLICATION: 40 CREAM TOPICAL at 10:08

## 2022-07-21 RX ADMIN — LIDOCAINE 4%: 4 CREAM TOPICAL at 23:13

## 2022-07-21 RX ADMIN — NORTRIPTYLINE HYDROCHLORIDE 100 MG: 50 CAPSULE ORAL at 23:05

## 2022-07-21 RX ADMIN — FERROUS GLUCONATE 324 MG: 324 TABLET ORAL at 23:05

## 2022-07-21 RX ADMIN — CEFAZOLIN SODIUM 2000 MG: 2 SOLUTION INTRAVENOUS at 23:30

## 2022-07-21 RX ADMIN — HEPARIN SODIUM 5000 UNITS: 5000 INJECTION INTRAVENOUS; SUBCUTANEOUS at 23:05

## 2022-07-21 NOTE — H&P
Agip U  91  H&P  Geetha Colon 65 y o  female MRN: [id number]  Unit/Bed#: -01 Encounter: [id number]  Admission Date: 07/21/22    ASSESSMENT:    Issac Quintero is a 65 y o  female with:    1  Left Plantar foot ulceration- Hernandez 3  2  Osteomyelitis  3  Chronic venous hypertension  4  Rheumatoid Arthritis  5  Restless Leg Syndrome    PLAN:    · Patient evaluated and treated at bedside, left foot ulceration probes deep to joint capsule/bone  Purulent drainage expressed from wound  Patient admitted for IV antibiotics and surgical intervention  · Plan for OR 5th Metatarsal head resection, with possible 5th toe flap  · IV Antibiotics: Ancef  · X-rays reviewed: No soft tissue emphysema evident  5th metatarsal head osteopenic  · Wound cultures obtained will f/u results  · Local wound care with Betadine DSD, with Maxorb and ABD to left leg  · Consult placed for internal medicine, medical management and pre-operative clearance  · Will discuss this plan with my attending and update as needed  Antibiotics started: Ancef  Pharmacologic VTE Prophylaxis: Heparin   Mechanical VTE Prophylaxis: sequential compression device   Weight Bearing Status: WBAT      Disposition:  Patient requires >2 midnight stay for treatment of left foot ulceration with possible osteomyelitis  Code Status: Level 1 - Full Code      SUBJECTIVE    History of Present Illness:    Issac Quintero is a 65 y o  female with pmh of Rheumatoid arthritis, restless legs syndrome, anxiety, hypothyroidism and age-related osteoporosis who presents with left foot  wound  She states that she has had this wound for approximately 2 months, which started as a small callus on the bottom of her foot  She states that over the past 2 months she noticed the wound has slowly increased in size and depth  At home her niece cares for her and dresses her wound for her daily   Patient admits to chills,  however denies fever, nausea, vomiting, shortness of breath, chest pain  She is very anxious during exam and gathering her medical history  She also states that her restless legs make it difficult for her to sit still when being examined, which only increases her anxiety  Review of Systems:    Constitutional: Negative  HENT: Negative  Eyes: Negative  Respiratory: Negative  Cardiovascular: Negative  Gastrointestinal: Negative  Musculoskeletal:   Negative   Skin: open wound   Neurological:  negative  Psych: Negative  Past Medical and Surgical History:     Past Medical History:   Diagnosis Date    Acute blood loss anemia 9/9/2020    Aftercare following joint replacement 9/9/2020    Patient had right reversed total shoulder arthroplasty   Pain was controlled when he left the hospital       Anxiety     Arthritis     Depression     Disease of thyroid gland     HYPO    Femur neck fracture (HCC)     GERD (gastroesophageal reflux disease)     Hyperthyroidism     RESOLVED: [id number]    Osteoporosis     Polio     PVD [facility]peripheral vascular disease) ([facility] [address] )     Right BBB/left ant fasc block     UTI (urinary tract infection)     Varicella infection     LAST ASSESSED: [id number]       Past Surgical History:   Procedure Laterality Date    APPENDECTOMY      HIP ARTHROPLASTY Left 11/27/2017    Procedure: REMOVAL IM NAIL CONVERSION TO TOTAL HIP ARTHROPLASTY POSTERIOR APPROACH;  Surgeon: Paige Haro MD;  [facility];  Service: Orthopedics    HIP FRACTURE SURGERY      HIP SURGERY      both hips replaced;2013 left ,2014 right    JOINT REPLACEMENT Right     HIP TOTAL    ND CONV PREV HIP SURG TO TOT HIP Jade Chester Left 10/4/2017    Procedure: Hareware removal of left hip;  Surgeon: Paige Haro MD;  [facility];  Service: Orthopedics    ND RECONSTR TOTAL SHOULDER IMPLANT Right 9/2/2020    Procedure: ARTHROPLASTY SHOULDER REVERSE;  Surgeon: Garfield Enciso MD;  [facility];  Service: Orthopedics    ND [address] [facility] IMPLANT Left 6/1/2021    Procedure: ARTHROPLASTY SHOULDER REVERSE;  Surgeon: Mirtha Baltazar MD;  [facility];  Service: Orthopedics    MA WRIST Teryl Amour LIG Right 5/24/2022    Procedure: RELEASE CARPAL TUNNEL ENDOSCOPIC-right;  Surgeon: Bonnie Hale MD;  [facility];  Service: Orthopedics    REVISION TOTAL HIP ARTHROPLASTY Right     TONSILLECTOMY         Meds/Allergies:    Facility-Administered Medications Prior to Admission   Medication    [COMPLETED] lidocaine (LMX) 4 % cream     Medications Prior to Admission   Medication    acetaminophen (TYLENOL) 650 mg CR tablet    Ascorbic Acid, Vitamin C, (VITAMIN C) 100 MG tablet    ferrous gluconate (FERGON) 240 (27 FE) MG tablet    folic acid (FOLVITE) 1 mg tablet    hydroxychloroquine (PLAQUENIL) 200 mg tablet    levothyroxine 25 mcg tablet    Multiple Vitamins-Minerals (CENTRUM SILVER PO)    predniSONE 5 mg tablet    meloxicam (Mobic) 7 5 mg tablet    methocarbamol (ROBAXIN) 750 mg tablet    methotrexate 2 5 MG tablet    nortriptyline (PAMELOR) 50 mg capsule    rOPINIRole (REQUIP) 2 mg tablet       No Known Allergies    Social History:    Social History     Marital Status:     Substance Use History:   Social History     Substance and Sexual Activity   Alcohol Use Not Currently     Social History     Tobacco Use   Smoking Status Former Smoker   Smokeless Tobacco Never Used   Tobacco Comment    quit 20-30 years ago     Social History     Substance and Sexual Activity   Drug Use Not Currently       Family History:    Family History   Problem Relation Age of Onset    Rheum arthritis Mother     Rheum arthritis Sister     Prostate cancer Brother          OBJECTIVE:    First Vitals:   Blood Pressure: 157/82 (07/21/22 1735)  Pulse: 82 (07/21/22 1735)  Temperature: 97 5 °F (36 4 °C) (07/21/22 1735)  Temp Source: Oral (07/21/22 1735)  Respirations: 18 (07/21/22 1735)  Weight - Scale: 45 4 kg (100 lb) (07/21/22 1735)  SpO2: 97 % (07/21/22 1735)    Current Vitals:   Blood Pressure: 157/82 (07/21/22 1735)  Pulse: 82 (07/21/22 1735)  Temperature: 97 5 °F (36 4 °C) (07/21/22 1735)  Temp Source: Oral (07/21/22 1735)  Respirations: 18 (07/21/22 1735)  Weight - Scale: 45 4 kg (100 lb) (07/21/22 1735)  SpO2: 97 % (07/21/22 1735)      Physical Exam:    Lower Extremity:  Vascular:   DP/PT pedal pulses on the left are present  DP/PT pedal pulses on the right are present  CRT brisk at the digits  Pedal hair is absent  +1 edema noted at bilateral lower extremities, with weeping from medial and lateral leg  Skin temperature is cool on palpation bilaterally  Musculoskeletal:  MMT is  4/5 in all muscle compartments bilaterally  Passive ROM at the 1st MPJ and ankle joint are decreased bilaterally, patients feet are rigidly contracted in plantarflexion  Active ROM at the lesser digits is diminished  Pain on palpation of Left plantar 5th metatarsal head  Hammering of digits 2 through 5 bilaterally    Dermatological:  Wound #:  1  Location:  Left plantar 5th metatarsal head  Length  1 6cm: Width  1 5cm: Depth  0 8cm:   Deepest Tissue Noted in Base:  5th metatarsophalangeal joint capsule  Probe to Bone: Yes  Peripheral Skin Description: Attached  Granulation:  50% Fibrotic Tissue:  50% Necrotic Tissue:  0%   Drainage Amount: moderate, serous, purulent  Signs of Infection: Yes probe to bone and purulent drainage      Neurological:  Gross sensation is intact  Protective sensation is intact  Sharp dull discrimination is intact  Lab Results:   No visits with results within 1 Day(s) from this visit     Latest known visit with results is:   Admission on 06/27/2022, Discharged on 06/27/2022   Component Date Value    Sodium 06/27/2022 137     Potassium 06/27/2022 3 3 (A)    Chloride 06/27/2022 103     CO2 06/27/2022 22     ANION GAP 06/27/2022 12     BUN 06/27/2022 17     Creatinine 06/27/2022 0 53 (A)    Glucose 06/27/2022 96     Calcium 06/27/2022 9 2     eGFR 06/27/2022 91        Cultures:            Imaging: I have personally reviewed pertinent films in PACS  No results found  EKG, Pathology, and Other Studies: I have personally reviewed pertinent reports

## 2022-07-21 NOTE — ASSESSMENT & PLAN NOTE
· Continue prednisone 5 mg daily and methotrexate per PTA medication regimen   · Possibly to require increase in steroids surround procedure; will defer to primary team as unsure how effects surgical outcome/healing

## 2022-07-21 NOTE — PROGRESS NOTES
Patient ID: Richard Sotomayor is a 65 y o  female Date of Birth 1945     Chief Complaint  Chief Complaint   Patient presents with    Follow Up Wound Care Visit     Left Foot and new wound to the LLE       Allergies  Patient has no known allergies  Assessment:    No problem-specific Assessment & Plan notes found for this encounter  Diagnoses and all orders for this visit:    Acute osteomyelitis of left foot ([address] )  -     Transfer to other facility    Pressure injury of left foot, stage 4 ([facility])  -     lidocaine (LMX) 4 % cream  -     Wound cleansing and dressings; Future  -     Transfer to other facility    Chronic venous hypertension (idiopathic) with ulcer of left lower extremity (CODE) ([facility])          Procedures    Plan:  1  Reviewed the previous wound care note and photo  Reviewed arterial study  2  Patient was counseled and educated on the condition and the diagnosis  3  She could not tolerate MRI  She did not obtain eviewed  Her wound looks worse and it would not heal as out-patient therapy  Recommended in-patient treatment with surgical excision of 5th met head and closure  Admit her to B  4  See admit H&P for more details  Wound 06/14/22 Pressure Injury Foot Left;Plantar (Active)   Wound Image Images linked 07/21/22 0947   Wound Description Granulation tissue; Yellow;Slough 07/21/22 0947   Tiffani-wound Assessment Callus; Maceration 07/21/22 0947   Wound Length (cm) 1 7 cm 07/21/22 0947   Wound Width (cm) 2 cm 07/21/22 0947   Wound Depth (cm) 0 8 cm 07/21/22 0947   Wound Surface Area (cm^2) 3 4 cm^2 07/21/22 0947   Wound Volume (cm^3) 2 72 cm^3 07/21/22 0947   Calculated Wound Volume (cm^3) 2 72 cm^3 07/21/22 0947   Change in Wound Size % -81 33 07/21/22 0947   Undermining 0 9 07/21/22 0947   Undermining is depth extending from 1-4 oclock 07/21/22 0947   Drainage Amount Moderate 07/21/22 0947   Drainage Description Serosanguineous 07/21/22 0947   Non-staged Wound Description Full thickness 07/21/22 0947   Dressing Status Intact 07/21/22 0947       Wound 07/21/22 Venous Ulcer Leg Left; Lower (Active)   Wound Image Images linked 07/21/22 0951   Wound Description Epithelialization;Yellow;Pink 07/21/22 0951   Tiffani-wound Assessment Edema 07/21/22 0951   Wound Length (cm) 8 cm 07/21/22 0951   Wound Width (cm) 12 cm 07/21/22 0951   Wound Depth (cm) 0 1 cm 07/21/22 0951   Wound Surface Area (cm^2) 96 cm^2 07/21/22 0951   Wound Volume (cm^3) 9 6 cm^3 07/21/22 0951   Calculated Wound Volume (cm^3) 9 6 cm^3 07/21/22 0951   Drainage Amount Moderate 07/21/22 0951   Drainage Description Serous 07/21/22 0951   Non-staged Wound Description Full thickness 07/21/22 0951       Wound 06/14/22 Pressure Injury Foot Left;Plantar (Active)   Date First Assessed/Time First Assessed: 06/14/22 1308   Primary Wound Type: Pressure Injury  Location: Foot  Wound Location Orientation: Left;Plantar  Wound Description (Comments): stage 3       Wound 07/21/22 Venous Ulcer Leg Left; Lower (Active)   Date First Assessed/Time First Assessed: 07/21/22 0951   Pre-Existing Wound: Yes  Primary Wound Type: Venous Ulcer  Location: Leg  Wound Location Orientation: Left; Lower       [REMOVED] Wound 09/02/20 Shoulder Right (Removed)   Resolved Date: 06/14/22  Date First Assessed/Time First Assessed: 09/02/20 1147   Location: Shoulder  Wound Location Orientation: Right  Wound Description (Comments): pillow sling applied  Incision's 1st Dressing: DRESSING MEPILEX AG BORDER 4 X 8 IN (  [REMOVED] Wound 06/01/21 Shoulder Left (Removed)   Resolved Date: 06/14/22  Date First Assessed/Time First Assessed: 06/01/21 1157   Location: Shoulder  Wound Location Orientation: Left  Wound Description (Comments): staples  pillow sling  Incision's 1st Dressing: DRESSING MEPILEX AG BORDER 4 X 8 IN             [REMOVED] Wound 05/24/22 Wrist Right (Removed)   Resolved Date: 06/14/22  Date First Assessed/Time First Assessed: 05/24/22 1005   Location: Wrist Wound Location Orientation: Right  Incision's 1st Dressing: DRESSING XEROFORM 1 X 8 (x1), SPONGE GAUZE 4 X 4 16 PLY STRL PLASTIC TRAY LF (x1), BANDAGE K  [REMOVED] Wound 05/24/22 Hand Right (Removed)   Resolved Date: 06/14/22  Date First Assessed/Time First Assessed: 05/24/22 1005   Location: Hand  Wound Location Orientation: Right  Wound Outcome: Unknown (No longer present)         Subjective:      HPI  Samuel Chauhan presents for left foot wound  She has painful wound left foot  She also has some weeping in her left leg  She did not schedule bone scan yet  The following portions of the patient's history were reviewed and updated as appropriate: allergies, current medications, past family history, past medical history, past social history, past surgical history and problem list       PAST MEDICAL HISTORY:  Past Medical History:   Diagnosis Date    Acute blood loss anemia 9/9/2020    Aftercare following joint replacement 9/9/2020    Patient had right reversed total shoulder arthroplasty   Pain was controlled when he left the hospital       Anxiety     Arthritis     Depression     Disease of thyroid gland     HYPO    Femur neck fracture (HCC)     GERD (gastroesophageal reflux disease)     Hyperthyroidism     RESOLVED: [id number]    Osteoporosis     Polio     PVD (peripheral vascular disease) ([facility] [address] )     Right BBB/left ant fasc block     UTI (urinary tract infection)     Varicella infection     LAST ASSESSED: [id number]       PAST SURGICAL HISTORY:  Past Surgical History:   Procedure Laterality Date    APPENDECTOMY      HIP ARTHROPLASTY Left 11/27/2017    Procedure: REMOVAL IM NAIL CONVERSION TO TOTAL HIP ARTHROPLASTY POSTERIOR APPROACH;  Surgeon: Ryan Rodriguez MD;  [facility];  Service: Orthopedics    HIP FRACTURE SURGERY      HIP SURGERY      both hips replaced;2013 left ,2014 right    JOINT REPLACEMENT Right     HIP TOTAL    TN CONV PREV HIP SURG TO TOT HIP ARTHROPLAS Left 10/4/2017    Procedure: Donnald Linea removal of left hip;  Surgeon: Cristino Sidhu MD;  [facility];  Service: Orthopedics    OH RECONSTR TOTAL SHOULDER IMPLANT Right 9/2/2020    Procedure: ARTHROPLASTY SHOULDER REVERSE;  Surgeon: Ambrocio Mcdonald MD;  [facility];  Service: Orthopedics    OH RECONSTR TOTAL SHOULDER IMPLANT Left 6/1/2021    Procedure: ARTHROPLASTY SHOULDER REVERSE;  Surgeon: Ambrocio Mcdonald MD;  [facility];  Service: Orthopedics    OH WRIST Natali Nyann LIG Right 5/24/2022    Procedure: RELEASE CARPAL TUNNEL ENDOSCOPIC-right;  Surgeon: Kip Brady MD;  [facility];  Service: Orthopedics    REVISION TOTAL HIP ARTHROPLASTY Right     TONSILLECTOMY          ALLERGIES:  Patient has no known allergies      MEDICATIONS:  Current Outpatient Medications   Medication Sig Dispense Refill    acetaminophen (TYLENOL) 650 mg CR tablet Take 1 tablet (650 mg total) by mouth every 8 (eight) hours as needed for mild pain (Patient not taking: No sig reported) 30 tablet 0    Ascorbic Acid, Vitamin C, (VITAMIN C) 100 MG tablet Take 400 mg by mouth daily      ferrous gluconate (FERGON) 240 (27 FE) MG tablet Take 1 tablet (240 mg total) by mouth in the morning 90 tablet 1    folic acid (FOLVITE) 1 mg tablet Take 2,000 mcg by mouth daily      hydroxychloroquine (PLAQUENIL) 200 mg tablet 1 pill am 1/2 pill pm      levothyroxine 25 mcg tablet Take 1 tablet (25 mcg total) by mouth daily 90 tablet 1    meloxicam (Mobic) 7 5 mg tablet Take 1 tablet (7 5 mg total) by mouth daily 30 tablet 0    methocarbamol (ROBAXIN) 750 mg tablet Take 1 tablet (750 mg total) by mouth 2 (two) times a dayas needed for muscle spasms 30 tablet 0    methotrexate 2 5 MG tablet 3 tablets by mouth once a week on Thursday      Multiple Vitamins-Minerals (CENTRUM SILVER PO) Take 1 tablet by mouth daily      nortriptyline (PAMELOR) 50 mg capsule Take 2 capsules (100 mg total) by mouth daily at bedtime 90 capsule 1    predniSONE 5 mg tablet Take 5 mg by mouth daily      rOPINIRole (REQUIP) 2 mg tablet Take 1 tablet (2 mg total) by mouth daily at bedtime Take 1 tablet by mouth daily at bedtime 90 tablet 1     No current facility-administered medications for this visit  SOCIAL HISTORY:  Social History     Socioeconomic History    Marital status:      Spouse name: None    Number of children: 1    Years of education: None    Highest education level: None   Occupational History    Occupation: RETIRED    Tobacco Use    Smoking status: Former Smoker    Smokeless tobacco: Never Used    Tobacco comment: quit 20-30 years ago   Vaping Use    Vaping Use: Never used   Substance and Sexual Activity    Alcohol use: Not Currently    Drug use: Not Currently    Sexual activity: Not Currently   Other Topics Concern    None   Social History Narrative    Always uses seat belt    Caffeine use    [employer]    Single as per all scripts      Social Determinants of Health     Financial Resource Strain: Not on file   Food Insecurity: Not on file   Transportation Needs: Not on file   Physical Activity: Not on file   Stress: Not on file   Social Connections: Not on file   Intimate Partner Violence: Not on file   Housing Stability: Not on file      Review of Systems   Constitutional: Negative for appetite change, chills and fever  Respiratory: Negative for cough and shortness of breath  Cardiovascular: Positive for leg swelling  Negative for chest pain  Gastrointestinal: Negative for diarrhea, nausea and vomiting  Musculoskeletal: Positive for gait problem  Skin: Positive for wound  Objective:       Wound 06/14/22 Pressure Injury Foot Left;Plantar (Active)   Wound Image Images linked 07/21/22 0947   Wound Description Granulation tissue; Yellow;Slough 07/21/22 0947   Tiffani-wound Assessment Callus; Maceration 07/21/22 0947   Wound Length (cm) 1 7 cm 07/21/22 0947   Wound Width (cm) 2 cm 07/21/22 0947   Wound Depth (cm) 0 8 cm 07/21/22 0947   Wound Surface Area (cm^2) 3 4 cm^2 07/21/22 0947   Wound Volume (cm^3) 2 72 cm^3 07/21/22 0947   Calculated Wound Volume (cm^3) 2 72 cm^3 07/21/22 0947   Change in Wound Size % -81 33 07/21/22 0947   Undermining 0 9 07/21/22 0947   Undermining is depth extending from 1-4 oclock 07/21/22 0947   Drainage Amount Moderate 07/21/22 0947   Drainage Description Serosanguineous 07/21/22 0947   Non-staged Wound Description Full thickness 07/21/22 0947   Dressing Status Intact 07/21/22 0947       Wound 07/21/22 Venous Ulcer Leg Left; Lower (Active)   Wound Image Images linked 07/21/22 0951   Wound Description Epithelialization;Yellow;Pink 07/21/22 0951   Tiffani-wound Assessment Edema 07/21/22 0951   Wound Length (cm) 8 cm 07/21/22 0951   Wound Width (cm) 12 cm 07/21/22 0951   Wound Depth (cm) 0 1 cm 07/21/22 0951   Wound Surface Area (cm^2) 96 cm^2 07/21/22 0951   Wound Volume (cm^3) 9 6 cm^3 07/21/22 0951   Calculated Wound Volume (cm^3) 9 6 cm^3 07/21/22 0951   Drainage Amount Moderate 07/21/22 0951   Drainage Description Serous 07/21/22 0951   Non-staged Wound Description Full thickness 07/21/22 0951       /74   Pulse 81   Temp 97 7 °F (36 5 °C)   Resp 20     Physical Exam  Vitals and nursing note reviewed  Constitutional:       General: She is not in acute distress  Appearance: She is not toxic-appearing or diaphoretic  Cardiovascular:      Rate and Rhythm: Normal rate and regular rhythm  Pulses:           Dorsalis pedis pulses are 0 on the right side and 0 on the left side  Posterior tibial pulses are 1+ on the right side and 0 on the left side  Comments: No gangrene  Pulmonary:      Effort: Pulmonary effort is normal  No respiratory distress  Musculoskeletal:         General: No signs of injury  Right lower leg: Edema present  Left lower leg: Edema present  Right foot: No foot drop        Left foot: No foot drop    Feet:      Right foot:      Skin integrity: Dry skin present  Left foot:      Skin integrity: Dry skin present  Skin:     General: Skin is warm  Capillary Refill: Capillary refill takes less than 2 seconds  Coloration: Skin is not cyanotic  Findings: Wound present  No abscess  Nails: There is no clubbing  Comments: Wound presents left submet 5  Exposed capsule left 5th MPJ  No pus  Multiple blister with serous drainage left leg  See wound assessment  Neurological:      General: No focal deficit present  Mental Status: She is alert and oriented to person, place, and time  Cranial Nerves: No cranial nerve deficit  Sensory: No sensory deficit  Psychiatric:         Mood and Affect: Mood normal          Behavior: Behavior normal          Thought Content: Thought content normal          Judgment: Judgment normal            Wound Instructions:  Orders Placed This Encounter   Procedures    Wound cleansing and dressings     Left foot and LLE wounds:     May shower with protection or sponge bath at this time      Cleanse with mild soap and water  Pat dry  Apply silver alginate to wound  Cover with gauze, kkling and tape  Change dressing 3 times a week and as needed for excessive drainage/dislodgement      This was performed at wound care center today          Recommend possible surgical intervention to the left foot----patient to be a direct admit to the hospital    Wounds mechanically debrided today at the [facility] with NSS and gauze           Off-loading Instructions:   Keep weight and pressure off wound at all times  Use walker when walking  Surgical shoe to the left foot     Standing Status:   Future     Standing Expiration Date:   7/21/2023    Transfer to other facility     Order Specific Question:   Call back Phone Number:     Answer:   [phone]     Order Specific Question:   Request Type:      Answer:   Transfer to [address] [place] [address]     Order Specific Question:   Hospital Area: Answer:   [address] [place] [address] [[id number]]     Order Specific Question:   Reason for Transfer:     Answer:   Direct Admission [18]     Order Specific Question:   Service: Answer:   Podiatry [[id number]]     Order Specific Question:   Level of Care: Answer:   Med Surg [16]        Diagnosis ICD-10-CM Associated Orders   1  Acute osteomyelitis of left foot ([facility] [address] )  M86 172 Transfer to other facility   2  Pressure injury of left foot, stage 4 ([facility])  [id number] lidocaine (LMX) 4 % cream     Wound cleansing and dressings     Transfer to other facility   3   Chronic venous hypertension (idiopathic) with ulcer of left lower extremity (CODE) ([facility])  I14 271

## 2022-07-21 NOTE — TELEPHONE ENCOUNTER
Patient daughter called and methocarbamol (ROBAXIN) 750 mg tablet   Is not working for her is there another med she can use

## 2022-07-21 NOTE — PATIENT INSTRUCTIONS
Orders Placed This Encounter   Procedures    Wound cleansing and dressings     Left foot and LLE wounds:     May shower with protection or sponge bath at this time      Cleanse with mild soap and water  Pat dry  Apply silver alginate to wound  Cover with gauze, kkling and tape  Change dressing 3 times a week and as needed for excessive drainage/dislodgement      This was performed at wound care center today          Recommend possible surgical intervention to the left foot----patient to be a direct admit to the hospital    Wounds mechanically debrided today at the [facility] with NSS and gauze           Off-loading Instructions:   Keep weight and pressure off wound at all times   Use walker when walking  Surgical shoe to the left foot     Standing Status:   Future     Standing Expiration Date:   7/21/2023

## 2022-07-21 NOTE — TELEPHONE ENCOUNTER
Please let please Let daughter know that the muscle relaxer does not typically help with the restless leg syndrome  Patient is already on the medication called ropinirole which is supposed to help withrestless leg that she is supposed to take an hour prior to bedtime

## 2022-07-22 LAB
ATRIAL RATE: 75 BPM
P AXIS: 56 DEGREES
PR INTERVAL: 126 MS
QRS AXIS: 83 DEGREES
QRSD INTERVAL: 128 MS
QT INTERVAL: 434 MS
QTC INTERVAL: 484 MS
T WAVE AXIS: 43 DEGREES
VENTRICULAR RATE: 75 BPM

## 2022-07-22 PROCEDURE — 93010 ELECTROCARDIOGRAM REPORT: CPT | Performed by: INTERNAL MEDICINE

## 2022-07-22 PROCEDURE — 99232 SBSQ HOSP IP/OBS MODERATE 35: CPT | Performed by: PODIATRIST

## 2022-07-22 PROCEDURE — 99222 1ST HOSP IP/OBS MODERATE 55: CPT | Performed by: NURSE PRACTITIONER

## 2022-07-22 PROCEDURE — 93005 ELECTROCARDIOGRAM TRACING: CPT

## 2022-07-22 RX ORDER — AMOXICILLIN 250 MG
1 CAPSULE ORAL 2 TIMES DAILY
Status: DISCONTINUED | OUTPATIENT
Start: 2022-07-22 | End: 2022-08-03 | Stop reason: HOSPADM

## 2022-07-22 RX ORDER — ACETAMINOPHEN 325 MG/1
975 TABLET ORAL EVERY 8 HOURS SCHEDULED
Status: DISCONTINUED | OUTPATIENT
Start: 2022-07-22 | End: 2022-07-24

## 2022-07-22 RX ORDER — HYDROMORPHONE HCL IN WATER/PF 6 MG/30 ML
0.2 PATIENT CONTROLLED ANALGESIA SYRINGE INTRAVENOUS EVERY 4 HOURS PRN
Status: DISCONTINUED | OUTPATIENT
Start: 2022-07-21 | End: 2022-07-24

## 2022-07-22 RX ORDER — LANOLIN ALCOHOL/MO/W.PET/CERES
6 CREAM (GRAM) TOPICAL
Status: DISCONTINUED | OUTPATIENT
Start: 2022-07-22 | End: 2022-08-03 | Stop reason: HOSPADM

## 2022-07-22 RX ORDER — POTASSIUM CHLORIDE 20 MEQ/1
40 TABLET, EXTENDED RELEASE ORAL ONCE
Status: COMPLETED | OUTPATIENT
Start: 2022-07-22 | End: 2022-07-22

## 2022-07-22 RX ORDER — ONDANSETRON 2 MG/ML
4 INJECTION INTRAMUSCULAR; INTRAVENOUS EVERY 4 HOURS PRN
Status: DISCONTINUED | OUTPATIENT
Start: 2022-07-21 | End: 2022-08-03 | Stop reason: HOSPADM

## 2022-07-22 RX ORDER — HYDROXYZINE HYDROCHLORIDE 25 MG/1
25 TABLET, FILM COATED ORAL EVERY 6 HOURS PRN
Status: DISCONTINUED | OUTPATIENT
Start: 2022-07-21 | End: 2022-08-03 | Stop reason: HOSPADM

## 2022-07-22 RX ORDER — OXYCODONE HYDROCHLORIDE 5 MG/1
2.5 TABLET ORAL EVERY 4 HOURS PRN
Status: DISCONTINUED | OUTPATIENT
Start: 2022-07-21 | End: 2022-07-25

## 2022-07-22 RX ORDER — POLYETHYLENE GLYCOL 3350 17 G/17G
17 POWDER, FOR SOLUTION ORAL DAILY PRN
Status: DISCONTINUED | OUTPATIENT
Start: 2022-07-21 | End: 2022-08-03 | Stop reason: HOSPADM

## 2022-07-22 RX ORDER — OXYCODONE HYDROCHLORIDE 5 MG/1
5 TABLET ORAL EVERY 4 HOURS PRN
Status: DISCONTINUED | OUTPATIENT
Start: 2022-07-21 | End: 2022-07-25

## 2022-07-22 RX ADMIN — METHOCARBAMOL TABLETS 750 MG: 750 TABLET, COATED ORAL at 08:43

## 2022-07-22 RX ADMIN — FOLIC ACID 2000 MCG: 1 TABLET ORAL at 08:43

## 2022-07-22 RX ADMIN — HEPARIN SODIUM 5000 UNITS: 5000 INJECTION INTRAVENOUS; SUBCUTANEOUS at 06:04

## 2022-07-22 RX ADMIN — METHOCARBAMOL TABLETS 750 MG: 750 TABLET, COATED ORAL at 17:33

## 2022-07-22 RX ADMIN — ACETAMINOPHEN 975 MG: 325 TABLET, FILM COATED ORAL at 13:02

## 2022-07-22 RX ADMIN — FERROUS GLUCONATE 324 MG: 324 TABLET ORAL at 08:51

## 2022-07-22 RX ADMIN — OXYCODONE HYDROCHLORIDE 5 MG: 5 TABLET ORAL at 08:43

## 2022-07-22 RX ADMIN — SENNOSIDES AND DOCUSATE SODIUM 1 TABLET: 8.6; 5 TABLET ORAL at 08:43

## 2022-07-22 RX ADMIN — PREDNISONE 5 MG: 5 TABLET ORAL at 08:43

## 2022-07-22 RX ADMIN — CEFAZOLIN SODIUM 2000 MG: 2 SOLUTION INTRAVENOUS at 13:02

## 2022-07-22 RX ADMIN — MELOXICAM 7.5 MG: 7.5 TABLET ORAL at 08:52

## 2022-07-22 RX ADMIN — ACETAMINOPHEN 975 MG: 325 TABLET, FILM COATED ORAL at 00:38

## 2022-07-22 RX ADMIN — MELATONIN 6 MG: at 21:40

## 2022-07-22 RX ADMIN — NORTRIPTYLINE HYDROCHLORIDE 100 MG: 50 CAPSULE ORAL at 21:40

## 2022-07-22 RX ADMIN — MULTIPLE VITAMINS W/ MINERALS TAB 1 TABLET: TAB ORAL at 08:43

## 2022-07-22 RX ADMIN — LEVOTHYROXINE SODIUM 25 MCG: 25 TABLET ORAL at 08:43

## 2022-07-22 RX ADMIN — HYDROXYCHLOROQUINE SULFATE 200 MG: 200 TABLET ORAL at 08:52

## 2022-07-22 RX ADMIN — POTASSIUM CHLORIDE 40 MEQ: 1500 TABLET, EXTENDED RELEASE ORAL at 00:39

## 2022-07-22 RX ADMIN — Medication 375 MG: at 08:52

## 2022-07-22 RX ADMIN — ACETAMINOPHEN 975 MG: 325 TABLET, FILM COATED ORAL at 21:44

## 2022-07-22 RX ADMIN — HEPARIN SODIUM 5000 UNITS: 5000 INJECTION INTRAVENOUS; SUBCUTANEOUS at 13:02

## 2022-07-22 RX ADMIN — OXYCODONE HYDROCHLORIDE 5 MG: 5 TABLET ORAL at 00:39

## 2022-07-22 RX ADMIN — CEFAZOLIN SODIUM 2000 MG: 2 SOLUTION INTRAVENOUS at 06:04

## 2022-07-22 RX ADMIN — OXYCODONE HYDROCHLORIDE 5 MG: 5 TABLET ORAL at 19:30

## 2022-07-22 RX ADMIN — HEPARIN SODIUM 5000 UNITS: 5000 INJECTION INTRAVENOUS; SUBCUTANEOUS at 21:40

## 2022-07-22 RX ADMIN — ACETAMINOPHEN 975 MG: 325 TABLET, FILM COATED ORAL at 06:04

## 2022-07-22 RX ADMIN — OXYCODONE HYDROCHLORIDE 5 MG: 5 TABLET ORAL at 15:05

## 2022-07-22 RX ADMIN — ROPINIROLE HYDROCHLORIDE 2 MG: 1 TABLET, FILM COATED ORAL at 21:40

## 2022-07-22 RX ADMIN — MELATONIN 6 MG: at 00:38

## 2022-07-22 RX ADMIN — CEFAZOLIN SODIUM 2000 MG: 2 SOLUTION INTRAVENOUS at 21:40

## 2022-07-22 NOTE — CONSULTS
[address] 1945, 65 y o  female MRN: [id number]  Unit/Bed#: -01 Encounter: [id number]  Primary Care Provider: Cathi Munoz MD   Date and time admitted to hospital: 7/21/2022  5:59 PM    Inpatient consult to Internal Medicine  Consult performed by: CONNIE Ramírez  Consult ordered by: Becky Palacios DPM          * Osteomyelitis [facility])  Assessment & Plan  Background: Presented to the ED with L foot pain/wound  · Admitted to Podiatry service; SLIM consulted for preop clearance  · XR confirming bone involvement   · Continue IV abx and pending surgical cure, ideally would not continue post operatively   · Local wound care per primary   · Plan for OR resection   · EKG ordered     Anxiety  Assessment & Plan  · Atarax ordered PRN   · Would avoid benzos if possible     Hypothyroidism due to Hashimoto's thyroiditis  Assessment & Plan  · Continue synthroid therapy     Rheumatoid arthritis involving multiple sites with positive rheumatoid factor (HCC)  Assessment & Plan  · Continue prednisone 5 mg daily and methotrexate per PTA medication regimen   · Possibly to require increase in steroids surround procedure; will defer to primary team as unsure how effects surgical outcome/healing     Left foot pain  Assessment & Plan  · In the setting of primary problem   · Geriatric pain regimen ordered   · Bowel regimen ordered given narcotic administration       VTE Prophylaxis:   Moderate Risk (Score 3-4) - Pharmacological DVT Prophylaxis Ordered: heparin  Recommendations for Discharge:  · Per primary team     Counseling / Coordination of Care Time: 30 minutes Greater than 50% of total time spent on patient counseling and coordination of care  Collaboration of Care:  Were Recommendations Directly Discussed with Primary Treatment Team? No    History of Present Illness:  Ori Brennan is a 65 y o  female who is originally admitted to the Podiatry service due to osteomylitis  We are consulted for preoperative clearance     Review of Systems:  Review of Systems   Constitutional: Negative for chills, diaphoresis, fatigue and fever  Respiratory: Negative for shortness of breath  Cardiovascular: Negative for chest pain, palpitations and leg swelling  Gastrointestinal: Negative for constipation, diarrhea, nausea and vomiting  Musculoskeletal: Positive for gait problem  Foot pain      Skin: Positive for color change and wound  Neurological: Negative for dizziness  Past Medical and Surgical History:   Past Medical History:   Diagnosis Date    Acute blood loss anemia 9/9/2020    Aftercare following joint replacement 9/9/2020    Patient had right reversed total shoulder arthroplasty   Pain was controlled when he left the hospital       Anxiety     Arthritis     Depression     Disease of thyroid gland     HYPO    Femur neck fracture (HCC)     GERD (gastroesophageal reflux disease)     Hyperthyroidism     RESOLVED: [id number]    Osteoporosis     Polio     PVD (peripheral vascular disease) ([facility] [address] )     Right BBB/left ant fasc block     UTI (urinary tract infection)     Varicella infection     LAST ASSESSED: [id number]       Past Surgical History:   Procedure Laterality Date    APPENDECTOMY      HIP ARTHROPLASTY Left 11/27/2017    Procedure: REMOVAL IM NAIL CONVERSION TO TOTAL HIP ARTHROPLASTY POSTERIOR APPROACH;  Surgeon: Gloria Browne MD;  [facility];  Service: Orthopedics    HIP FRACTURE SURGERY      HIP SURGERY      both hips replaced;2013 left ,2014 right    JOINT REPLACEMENT Right     HIP TOTAL    MD CONV PREV HIP SURG TO TOT HIP Benjamen Drop Left 10/4/2017    Procedure: Hareware removal of left hip;  Surgeon: Gloria Browne MD;  [facility];  Service: Orthopedics    MD RECONSTR TOTAL SHOULDER IMPLANT Right 9/2/2020    Procedure: ARTHROPLASTY SHOULDER REVERSE;  Surgeon: Rosey Mayo MD; [facility];  Service: Orthopedics    HI RECONSTR TOTAL SHOULDER IMPLANT Left 2021    Procedure: ARTHROPLASTY SHOULDER REVERSE;  Surgeon: New Devlin MD;  [facility];  Service: Orthopedics    HI WRIST Juanaprem Mejiaan LIG Right 2022    Procedure: RELEASE CARPAL TUNNEL ENDOSCOPIC-right;  Surgeon: Myla Baeza MD;  [facility];  Service: Orthopedics    REVISION TOTAL HIP ARTHROPLASTY Right     TONSILLECTOMY         Meds/Allergies:  PTA meds:   Prior to Admission Medications   Prescriptions Last Dose Informant Patient Reported? Taking?    Ascorbic Acid, Vitamin C, (VITAMIN C) 100 MG tablet 2022 at Unknown time Self Yes Yes   Sig: Take 400 mg by mouth daily   Multiple Vitamins-Minerals (CENTRUM SILVER PO) 2022 at Unknown time Self Yes Yes   Sig: Take 1 tablet by mouth daily   acetaminophen (TYLENOL) 650 mg CR tablet 2022 at Unknown time Self No Yes   Sig: Take 1 tablet (650 mg total) by mouth every 8 (eight) hours as needed for mild pain   ferrous gluconate (FERGON) 240 (27 FE) MG tablet 2022 at Unknown time Self No Yes   Sig: Take 1 tablet (240 mg total) by mouth in the morning   folic acid (FOLVITE) 1 mg tablet 2022 at Unknown time Self Yes Yes   Sig: Take 2,000 mcg by mouth daily   hydroxychloroquine (PLAQUENIL) 200 mg tablet 2022 at Unknown time Self Yes Yes   Si pill am 1/2 pill pm   levothyroxine 25 mcg tablet 2022 at Unknown time Self No Yes   Sig: Take 1 tablet (25 mcg total) by mouth daily   meloxicam (Mobic) 7 5 mg tablet Unknown at Unknown time Self No No   Sig: Take 1 tablet (7 5 mg total) by mouth daily   methocarbamol (ROBAXIN) 750 mg tablet Unknown at Unknown time  No No   Sig: Take 1 tablet (750 mg total) by mouth 2 (two) times a dayas needed for muscle spasms   methotrexate 2 5 MG tablet Unknown at Unknown time Self No No   Sig: 3 tablets by mouth once a week on Thursday   nortriptyline (PAMELOR) 50 mg capsule Unknown at Unknown time Self No No   Sig: Take 2 capsules (100 mg total) by mouth daily at bedtime   predniSONE 5 mg tablet 7/21/2022 at Unknown time Self Yes Yes   Sig: Take 5 mg by mouth daily   rOPINIRole (REQUIP) 2 mg tablet Unknown at Unknown time Self No No   Sig: Take 1 tablet (2 mg total) by mouth daily at bedtime Take 1 tablet by mouth daily at bedtime      Facility-Administered Medications Last Administration Doses Remaining   lidocaine (LMX) 4 % cream 7/21/2022 10:08 AM 0          Allergies: No Known Allergies    Social History:  Marital Status:   Substance Use History:   Social History     Substance and Sexual Activity   Alcohol Use Not Currently     Social History     Tobacco Use   Smoking Status Former Smoker   Smokeless Tobacco Never Used   Tobacco Comment    quit 20-30 years ago     Social History     Substance and Sexual Activity   Drug Use Not Currently       Family History:  Family History   Problem Relation Age of Onset    Rheum arthritis Mother     Rheum arthritis Sister     Prostate cancer Brother        Physical Exam:   Vitals:   Blood Pressure: 105/55 (07/21/22 2335)  Pulse: 83 (07/21/22 2335)  Temperature: 98 1 °F (36 7 °C) (07/21/22 2335)  Temp Source: Oral (07/21/22 1735)  Respirations: 18 (07/21/22 1735)  Weight - Scale: 45 4 kg (100 lb) (07/21/22 1735)  SpO2: 97 % (07/21/22 2335)    Physical Exam  Constitutional:       Appearance: She is not ill-appearing  Cardiovascular:      Rate and Rhythm: Normal rate  Pulses: Normal pulses  Heart sounds: Normal heart sounds  No murmur heard  Pulmonary:      Effort: Pulmonary effort is normal       Breath sounds: Normal breath sounds  Abdominal:      General: Abdomen is flat  Bowel sounds are normal       Palpations: Abdomen is soft  Tenderness: There is no abdominal tenderness  Musculoskeletal:         General: No swelling  Skin:     General: Skin is warm and dry        Capillary Refill: Capillary refill takes less than 2 seconds  Findings: Lesion present  Neurological:      Mental Status: She is alert and oriented to person, place, and time  Psychiatric:         Mood and Affect: Mood normal          Behavior: Behavior normal  Behavior is cooperative  Additional Data:   Lab Results:    Results from last 7 days   Lab Units 07/21/22 2020   WBC Thousand/uL 7 99   HEMOGLOBIN g/dL 9 9*   HEMATOCRIT % 32 0*   PLATELETS Thousands/uL 463*   NEUTROS PCT % 72   LYMPHS PCT % 18   MONOS PCT % 9   EOS PCT % 1     Results from last 7 days   Lab Units 07/21/22 2020   SODIUM mmol/L 140   POTASSIUM mmol/L 3 3*   CHLORIDE mmol/L 108   CO2 mmol/L 25   BUN mg/dL 8   CREATININE mg/dL 0 45*   ANION GAP mmol/L 7   CALCIUM mg/dL 8 8   ALBUMIN g/dL 2 8*   TOTAL BILIRUBIN mg/dL 0 37   ALK PHOS U/L 85   ALT U/L 25   AST U/L 34   GLUCOSE RANDOM mg/dL 83             Lab Results   Component Value Date/Time    HGBA1C 5 3 05/11/2021 09:35 AM    HGBA1C 5 2 08/24/2020 12:36 PM    HGBA1C 5 4 08/22/2018 07:13 AM               Imaging: Personally reviewed the following imaging: xray(s)  XR foot 3+ vw left    (Results Pending)       EKG, Pathology, and Other Studies Reviewed on Admission:   · EKG: ordered, to review     ** Please Note: This note may have been constructed using a voice recognition system   **

## 2022-07-22 NOTE — ASSESSMENT & PLAN NOTE
Presented to the ED with increased L foot pain  Patient with left plantar foot ulceration, admitted to Podiatry service    · XR concerning for bone involvement   · Currently on IV Ancef   · Wound culture preliminarily growing staph aureus   · Local wound care per primary   · Plan for OR resection of 5th Metatarsal head - timing TBD   · Analgesics as needed   · PT/OT postoperatively

## 2022-07-22 NOTE — PROGRESS NOTES
Podiatry - Progress Note  Patient: Chely Butt Colon 65 y o  female   MRN: [id number]  PCP: Cathi Munoz MD  Unit/Bed#: -07 Encounter: [id number]  Date Of Visit: 22    ASSESSMENT:    Ori Brennan is a 65 y o  female with:    1  Left Plantar 5th metatarsal head ulceration- Hernandez 3  2  Osteomyelitis  3  Chronic venous hypertension  4  Rheumatoid Arthritis   5  Restless Leg Syndrome    PLAN:    · Patient evaluated at bedside, no acute changes from last exam  Patient will need surgical intervention for open wound with exposed MTPJ capsule  · Plan for Left 5th Metatarsal Head Resection, with skin flap or graft to close defect  · Will continue local wound care and serial exams until operative treatment  · Will trend labs/vitals  · Continue IV Ancef  · Wound cultures: positive for Staph aureus  · Continue local wound care, Maxorb DSD to left plantar wound  Appreciate nursing assistance with dressing changes  · Elevation and offloading on green foam wedges or pillows when non-ambulatory  · Appreciate recommendations of consulting services  Weightbearing status: Non-weightbearing left  foot    SUBJECTIVE:     The patient was seen, evaluated, and assessed at bedside today  The patient was awake, alert, and in no acute distress  No acute events overnight  The patient reports that she feels much better today and her pain is well controlled  She only feels pain when the dressing is changed  She understands the need for surgery, and is amenable to recommendations  Patient denies N/V/F/chills/SOB/CP  OBJECTIVE:     Vitals:   /67   Pulse 81   Temp 98 2 °F (36 8 °C)   Resp 18   Wt 45 4 kg (100 lb)   SpO2 97%   BMI 20 90 kg/m²     Temp (24hrs), Av °F (36 7 °C), Min:97 5 °F (36 4 °C), Max:98 2 °F (36 8 °C)      Physical Exam:     Lower Extremity:  Vascular:Pedal pulses intact  CRT <3 seconds  Pedal hair absent  Skin cool on palpation       Musculoskeletal: MMT 5/5, ROM decreased in bilateral lower extremities  Pain with palpation of plantar left foot wound  No pain with calf palpation or dorsiflexion at ankle joint  Neurologic: Gross sensation intact, protective sensation intact  Dermatologic:   Wound #: 1  Location: Plantar 5th metatarsal head   Length 1 6cm: Width 1 5cm: Depth 0 7cm:   Deepest Tissue Noted in Base: 5th MTPJ capsule  Probe to Bone: Yes  Peripheral Skin Description: Attached  Granulation: 50% Fibrotic Tissue: 50% Necrotic Tissue: 0%   Drainage Amount: minimal, serosanguinous  Signs of Infection: Yes probe to bone      Additional Data:     Labs:    Results from last 7 days   Lab Units 07/21/22 2020   WBC Thousand/uL 7 99   HEMOGLOBIN g/dL 9 9*   HEMATOCRIT % 32 0*   PLATELETS Thousands/uL 463*   NEUTROS PCT % 72   LYMPHS PCT % 18   MONOS PCT % 9   EOS PCT % 1     Results from last 7 days   Lab Units 07/21/22 2020   POTASSIUM mmol/L 3 3*   CHLORIDE mmol/L 108   CO2 mmol/L 25   BUN mg/dL 8   CREATININE mg/dL 0 45*   CALCIUM mg/dL 8 8   ALK PHOS U/L 85   ALT U/L 25   AST U/L 34           * I Have Reviewed All Lab Data Listed Above  Recent Cultures (last 7 days):     Results from last 7 days   Lab Units 07/21/22 1950   GRAM STAIN RESULT  Rare Gram positive cocci in pairs*  No polys seen*   WOUND CULTURE  2+ Growth of Staphylococcus aureus*           Imaging: I have personally reviewed pertinent films in PACS  EKG, Pathology, and Other Studies: I have personally reviewed pertinent reports  ** Please Note: Portions of the record may have been created with voice recognition software  Occasional wrong word or "sound a like" substitutions may have occurred due to the inherent limitations of voice recognition software  Read the chart carefully and recognize, using context, where substitutions have occurred   **

## 2022-07-22 NOTE — ASSESSMENT & PLAN NOTE
01/04/22 9:53 AM     See documentation in the [employer] SmartForm       Betina Patino MA Background: Presented to the ED with L foot pain/wound     · Admitted to Podiatry service; SLIM consulted for preop clearance  · XR confirming bone involvement   · Continue IV abx and pending surgical cure, ideally would not continue post operatively   · Local wound care per primary   · Plan for OR resection   · EKG ordered

## 2022-07-22 NOTE — ASSESSMENT & PLAN NOTE
· In the setting of primary problem   · Geriatric pain regimen ordered   · Bowel regimen ordered given narcotic administration

## 2022-07-23 LAB
ANION GAP SERPL CALCULATED.3IONS-SCNC: 6 MMOL/L (ref 4–13)
BASOPHILS # BLD AUTO: 0.02 THOUSANDS/ΜL (ref 0–0.1)
BASOPHILS NFR BLD AUTO: 1 % (ref 0–1)
BUN SERPL-MCNC: 11 MG/DL (ref 5–25)
CALCIUM SERPL-MCNC: 8.1 MG/DL (ref 8.3–10.1)
CHLORIDE SERPL-SCNC: 106 MMOL/L (ref 96–108)
CO2 SERPL-SCNC: 29 MMOL/L (ref 21–32)
CREAT SERPL-MCNC: 0.41 MG/DL (ref 0.6–1.3)
EOSINOPHIL # BLD AUTO: 0.09 THOUSAND/ΜL (ref 0–0.61)
EOSINOPHIL NFR BLD AUTO: 2 % (ref 0–6)
ERYTHROCYTE [DISTWIDTH] IN BLOOD BY AUTOMATED COUNT: 17.9 % (ref 11.6–15.1)
GFR SERPL CREATININE-BSD FRML MDRD: 99 ML/MIN/1.73SQ M
GLUCOSE SERPL-MCNC: 91 MG/DL (ref 65–140)
HCT VFR BLD AUTO: 30.7 % (ref 34.8–46.1)
HGB BLD-MCNC: 9.4 G/DL (ref 11.5–15.4)
IMM GRANULOCYTES # BLD AUTO: 0.03 THOUSAND/UL (ref 0–0.2)
IMM GRANULOCYTES NFR BLD AUTO: 1 % (ref 0–2)
LYMPHOCYTES # BLD AUTO: 0.88 THOUSANDS/ΜL (ref 0.6–4.47)
LYMPHOCYTES NFR BLD AUTO: 20 % (ref 14–44)
MCH RBC QN AUTO: 27.3 PG (ref 26.8–34.3)
MCHC RBC AUTO-ENTMCNC: 30.6 G/DL (ref 31.4–37.4)
MCV RBC AUTO: 89 FL (ref 82–98)
MONOCYTES # BLD AUTO: 0.43 THOUSAND/ΜL (ref 0.17–1.22)
MONOCYTES NFR BLD AUTO: 10 % (ref 4–12)
NEUTROPHILS # BLD AUTO: 2.93 THOUSANDS/ΜL (ref 1.85–7.62)
NEUTS SEG NFR BLD AUTO: 66 % (ref 43–75)
NRBC BLD AUTO-RTO: 0 /100 WBCS
PLATELET # BLD AUTO: 369 THOUSANDS/UL (ref 149–390)
PMV BLD AUTO: 8.3 FL (ref 8.9–12.7)
POTASSIUM SERPL-SCNC: 3 MMOL/L (ref 3.5–5.3)
RBC # BLD AUTO: 3.44 MILLION/UL (ref 3.81–5.12)
SODIUM SERPL-SCNC: 141 MMOL/L (ref 135–147)
WBC # BLD AUTO: 4.38 THOUSAND/UL (ref 4.31–10.16)

## 2022-07-23 PROCEDURE — 85025 COMPLETE CBC W/AUTO DIFF WBC: CPT

## 2022-07-23 PROCEDURE — 99232 SBSQ HOSP IP/OBS MODERATE 35: CPT | Performed by: PHYSICIAN ASSISTANT

## 2022-07-23 PROCEDURE — 92610 EVALUATE SWALLOWING FUNCTION: CPT

## 2022-07-23 PROCEDURE — 80048 BASIC METABOLIC PNL TOTAL CA: CPT

## 2022-07-23 PROCEDURE — 99232 SBSQ HOSP IP/OBS MODERATE 35: CPT | Performed by: PODIATRIST

## 2022-07-23 RX ORDER — POTASSIUM CHLORIDE 20 MEQ/1
40 TABLET, EXTENDED RELEASE ORAL 2 TIMES DAILY
Status: COMPLETED | OUTPATIENT
Start: 2022-07-23 | End: 2022-07-23

## 2022-07-23 RX ADMIN — SENNOSIDES AND DOCUSATE SODIUM 1 TABLET: 8.6; 5 TABLET ORAL at 16:30

## 2022-07-23 RX ADMIN — CEFAZOLIN SODIUM 2000 MG: 2 SOLUTION INTRAVENOUS at 21:25

## 2022-07-23 RX ADMIN — MULTIPLE VITAMINS W/ MINERALS TAB 1 TABLET: TAB ORAL at 08:13

## 2022-07-23 RX ADMIN — ACETAMINOPHEN 975 MG: 325 TABLET, FILM COATED ORAL at 06:03

## 2022-07-23 RX ADMIN — OXYCODONE HYDROCHLORIDE 2.5 MG: 5 TABLET ORAL at 12:12

## 2022-07-23 RX ADMIN — HYDROXYZINE HYDROCHLORIDE 25 MG: 25 TABLET ORAL at 14:07

## 2022-07-23 RX ADMIN — LEVOTHYROXINE SODIUM 25 MCG: 25 TABLET ORAL at 08:13

## 2022-07-23 RX ADMIN — ROPINIROLE HYDROCHLORIDE 2 MG: 1 TABLET, FILM COATED ORAL at 21:24

## 2022-07-23 RX ADMIN — OXYCODONE HYDROCHLORIDE 5 MG: 5 TABLET ORAL at 08:13

## 2022-07-23 RX ADMIN — POTASSIUM CHLORIDE 40 MEQ: 1500 TABLET, EXTENDED RELEASE ORAL at 08:13

## 2022-07-23 RX ADMIN — OXYCODONE HYDROCHLORIDE 5 MG: 5 TABLET ORAL at 16:30

## 2022-07-23 RX ADMIN — PREDNISONE 5 MG: 5 TABLET ORAL at 08:13

## 2022-07-23 RX ADMIN — Medication 375 MG: at 08:12

## 2022-07-23 RX ADMIN — POTASSIUM CHLORIDE 40 MEQ: 1500 TABLET, EXTENDED RELEASE ORAL at 12:11

## 2022-07-23 RX ADMIN — CEFAZOLIN SODIUM 2000 MG: 2 SOLUTION INTRAVENOUS at 12:13

## 2022-07-23 RX ADMIN — ACETAMINOPHEN 975 MG: 325 TABLET, FILM COATED ORAL at 21:25

## 2022-07-23 RX ADMIN — HEPARIN SODIUM 5000 UNITS: 5000 INJECTION INTRAVENOUS; SUBCUTANEOUS at 14:07

## 2022-07-23 RX ADMIN — SENNOSIDES AND DOCUSATE SODIUM 1 TABLET: 8.6; 5 TABLET ORAL at 08:13

## 2022-07-23 RX ADMIN — HYDROXYCHLOROQUINE SULFATE 200 MG: 200 TABLET ORAL at 08:13

## 2022-07-23 RX ADMIN — FERROUS GLUCONATE 324 MG: 324 TABLET ORAL at 08:12

## 2022-07-23 RX ADMIN — MELOXICAM 7.5 MG: 7.5 TABLET ORAL at 08:12

## 2022-07-23 RX ADMIN — ROPINIROLE HYDROCHLORIDE 2 MG: 1 TABLET, FILM COATED ORAL at 18:31

## 2022-07-23 RX ADMIN — MELATONIN 6 MG: at 21:25

## 2022-07-23 RX ADMIN — CEFAZOLIN SODIUM 2000 MG: 2 SOLUTION INTRAVENOUS at 06:03

## 2022-07-23 RX ADMIN — HEPARIN SODIUM 5000 UNITS: 5000 INJECTION INTRAVENOUS; SUBCUTANEOUS at 06:03

## 2022-07-23 RX ADMIN — FOLIC ACID 2000 MCG: 1 TABLET ORAL at 08:13

## 2022-07-23 RX ADMIN — NORTRIPTYLINE HYDROCHLORIDE 100 MG: 50 CAPSULE ORAL at 21:27

## 2022-07-23 RX ADMIN — HEPARIN SODIUM 5000 UNITS: 5000 INJECTION INTRAVENOUS; SUBCUTANEOUS at 21:24

## 2022-07-23 NOTE — PROGRESS NOTES
Podiatry - Progress Note  Patient: Kailey Yip Colon 65 y o  female   MRN: [id number]  PCP: You De Jesus MD  Unit/Bed#: -48 Encounter: [id number]  Date Of Visit: 22    ASSESSMENT:    Sebas Connor is a 65 y o  female with:    1  Left Plantar 5th metatarsal head ulceration- Hernandez 3  2  Left leg VSU  3  Osteomyelitis  4  Chronic venous hypertension  5  Rheumatoid Arthritis   6  Restless Leg Syndrome    PLAN:    · Plan to go to OR tomorrow for right partial 5th ray amputation with possible flap closure  Spoke to patient about procedure and she is agreeable  Will obtain written consent prior to procedure  · Dressings changed - Maxorb, DSD, ACE  · Continue local wound care, appreciate nursing assistance with dressing changes  · Elevation and offloading on green foam wedges or pillows when non-ambulatory  · Rest of care per primary team      Weightbearing status: Weightbearing as tolerated     SUBJECTIVE:     The patient was seen, evaluated, and assessed at bedside today  The patient was awake, alert, and in no acute distress  No acute events overnight  The patient reports pain during dressing changes  Patient denies N/V/F/chills/SOB/CP  OBJECTIVE:     Vitals:   /66   Pulse 81   Temp 97 9 °F (36 6 °C)   Resp 18   Wt 45 4 kg (100 lb)   SpO2 97%   BMI 20 90 kg/m²     Temp (24hrs), Av 1 °F (36 7 °C), Min:97 9 °F (36 6 °C), Max:98 2 °F (36 8 °C)      Physical Exam:     Lungs: Non labored breathing  Abdomen: Soft, non-tender  Lower Extremity:    Vascular:Pedal pulses intact  CRT <3 seconds  Pedal hair absent  Skin cool on palpation       Musculoskeletal: MMT 5/5, ROM decreased in bilateral lower extremities  Pain with palpation of plantar left foot wound  No pain with calf palpation or dorsiflexion at ankle joint       Neurologic: Gross sensation intact, protective sensation intact       Wound #: 1  Location: Plantar 5th metatarsal head   Length 1 6cm: Width 1 5cm: Depth 0 7cm: Deepest Tissue Noted in Base: 5th MTPJ capsule  Probe to Bone: Yes  Peripheral Skin Description: Attached  Granulation: 50% Fibrotic Tissue: 50% Necrotic Tissue: 0%   Drainage Amount: minimal, serosanguinous  Signs of Infection: Yes probe to bone    Wound #: 2  Location: anterior leg  Length 2cm: Width 1cm: Depth 0 1cm:   Deepest Tissue Noted in Base: Dermis  Probe to Bone: No  Peripheral Skin Description: Attached  Granulation: 100% Fibrotic Tissue: 0% Necrotic Tissue: 0%   Drainage Amount: minimal, serous  Signs of Infection: No    Clinical Images 07/23/22: Additional Data:     Labs:    Results from last 7 days   Lab Units 07/23/22  0608   WBC Thousand/uL 4 38   HEMOGLOBIN g/dL 9 4*   HEMATOCRIT % 30 7*   PLATELETS Thousands/uL 369   NEUTROS PCT % 66   LYMPHS PCT % 20   MONOS PCT % 10   EOS PCT % 2     Results from last 7 days   Lab Units 07/23/22  0608 07/21/22  2020   POTASSIUM mmol/L 3 0* 3 3*   CHLORIDE mmol/L 106 108   CO2 mmol/L 29 25   BUN mg/dL 11 8   CREATININE mg/dL 0 41* 0 45*   CALCIUM mg/dL 8 1* 8 8   ALK PHOS U/L  --  85   ALT U/L  --  25   AST U/L  --  34           * I Have Reviewed All Lab Data Listed Above  Recent Cultures (last 7 days):     Results from last 7 days   Lab Units 07/21/22  1950   GRAM STAIN RESULT  Rare Gram positive cocci in pairs*  No polys seen*   WOUND CULTURE  2+ Growth of Staphylococcus aureus*  1+ Growth of Gram Negative Cal*           Imaging: I have personally reviewed pertinent films in PACS  EKG, Pathology, and Other Studies: I have personally reviewed pertinent reports  ** Please Note: Portions of the record may have been created with voice recognition software  Occasional wrong word or "sound a like" substitutions may have occurred due to the inherent limitations of voice recognition software  Read the chart carefully and recognize, using context, where substitutions have occurred   **

## 2022-07-23 NOTE — PROGRESS NOTES
[address] [facility]  Progress Note Mayda Donald Colon 1945, 65 y o  female MRN: [id number]  Unit/Bed#: MS Ariza5-01 Encounter: [id number]  Primary Care Provider: Stephanie Sosa MD   Date and time admitted to hospital: 7/21/2022  5:59 PM    * Osteomyelitis [facility])  Assessment & Plan  Presented to the ED with increased L foot pain  Patient with left plantar foot ulceration, admitted to Podiatry service  · XR concerning for bone involvement   · Currently on IV Ancef   · Wound culture preliminarily growing staph aureus   · Local wound care per primary   · Plan for OR resection of 5th Metatarsal head - timing TBD   · Analgesics as needed   · PT/OT postoperatively     Rheumatoid arthritis involving multiple sites with positive rheumatoid factor (HCC)  Assessment & Plan  · Continue prednisone 5 mg daily and methotrexate per PTA medication regimen     Hypothyroidism due to Hashimoto's thyroiditis  Assessment & Plan  · Continue synthroid therapy     Anxiety  Assessment & Plan  · Atarax ordered PRN   · Would avoid benzos if possible       VTE Pharmacologic Prophylaxis:   Moderate Risk (Score 3-4) - Pharmacological DVT Prophylaxis Ordered: heparin drip  Patient Centered Rounds: I performed bedside rounds with nursing staff today  Discussions with Specialists or Other Care Team Provider: primary RN    Education and Discussions with Family / Patient: defer family updates to primary service  Time Spent for Care: 15 minutes  More than 50% of total time spent on counseling and coordination of care as described above  Current Length of Stay: 2 day(s)  Current Patient Status: Inpatient   Certification Statement: The patient will continue to require additional inpatient hospital stay due to pending OR with podiatry   Discharge Plan: David Orellana is following this patient on consult  They are not yet medically stable for discharge secondary to pending OR       Chronic medical problems are stable at this time  NAI will follow peripherally over the weekend and re-evaluate on   Please call with questions or concerns  Code Status: Level 1 - Full Code    Subjective:   Upon entering the room, patient notes her bedding is wet 2/2 purwhik  Otherwise denies complaints  Discussed still no timing for OR to my knowledge and to follow-up with podiatry today  Objective:     Vitals:   Temp (24hrs), Av 1 °F (36 7 °C), Min:97 9 °F (36 6 °C), Max:98 2 °F (36 8 °C)    Temp:  [97 9 °F (36 6 °C)-98 2 °F (36 8 °C)] 97 9 °F (36 6 °C)  HR:  [81] 81  BP: (141-159)/(66-67) 141/66  SpO2:  [97 %-98 %] 97 %  Body mass index is 20 9 kg/m²  Input and Output Summary (last 24 hours):   No intake or output data in the 24 hours ending 22 1010    Physical Exam:   Physical Exam  Vitals reviewed  Constitutional:       General: She is not in acute distress  Cardiovascular:      Rate and Rhythm: Normal rate and regular rhythm  Pulmonary:      Effort: Pulmonary effort is normal  No respiratory distress  Skin:     Comments: L foot covered in sock   Neurological:      General: No focal deficit present  Mental Status: She is alert and oriented to person, place, and time  Mental status is at baseline            Additional Data:     Labs:  Results from last 7 days   Lab Units 22  0608   WBC Thousand/uL 4 38   HEMOGLOBIN g/dL 9 4*   HEMATOCRIT % 30 7*   PLATELETS Thousands/uL 369   NEUTROS PCT % 66   LYMPHS PCT % 20   MONOS PCT % 10   EOS PCT % 2     Results from last 7 days   Lab Units 22  0608 22   SODIUM mmol/L 141 140   POTASSIUM mmol/L 3 0* 3 3*   CHLORIDE mmol/L 106 108   CO2 mmol/L 29 25   BUN mg/dL 11 8   CREATININE mg/dL 0 41* 0 45*   ANION GAP mmol/L 6 7   CALCIUM mg/dL 8 1* 8 8   ALBUMIN g/dL  --  2 8*   TOTAL BILIRUBIN mg/dL  --  0 37   ALK PHOS U/L  --  85   ALT U/L  --  25   AST U/L  --  34   GLUCOSE RANDOM mg/dL 91 83                       Lines/Drains:  Invasive Devices Report    Peripheral Intravenous Line  Duration           Peripheral IV 07/21/22 Left;Ventral (anterior) Forearm 1 day                      Imaging: No pertinent imaging reviewed      Recent Cultures (last 7 days):   Results from last 7 days   Lab Units 07/21/22 1950   GRAM STAIN RESULT  Rare Gram positive cocci in pairs*  No polys seen*   WOUND CULTURE  2+ Growth of Staphylococcus aureus*       Last 24 Hours Medication List:   Current Facility-Administered Medications   Medication Dose Route Frequency Provider Last Rate    acetaminophen  325 mg Oral Q6H PRN Mamie Shadow, DPM      acetaminophen  975 mg Oral [facility] CONNIE Elias      Ascorbic Acid (Vitamin C)  375 mg Oral Daily Mamie Shadow, DPM      cefazolin  2,000 mg Intravenous Q8H Mamie Shadow, DPM 2,000 mg (07/23/22 0603)    ferrous gluconate  324 mg Oral Daily Mamie Shadow, DPM      folic acid  4,631 mcg Oral Daily Mamie Shadow, DPM      heparin (porcine)  5,000 Units Subcutaneous [facility] Mamie Shadow, DPM      HYDROmorphone  0 2 mg Intravenous Q4H PRN CONNIE Paredes      hydroxychloroquine  200 mg Oral Daily With Breakfast Mamie Shadow, DPM      hydrOXYzine HCL  25 mg Oral Q6H PRN Shanel Gone, CONNIE      levothyroxine  25 mcg Oral Daily Mamie Shadow, TREVONM      melatonin  6 mg Oral HS CONNIE Paredes      meloxicam  7 5 mg Oral Daily Mamie Shadow, [place]      methocarbamol  750 mg Oral Q6H PRN Mamie Shadow, DPM      methotrexate  2 5 mg Oral Weekly Mamie Shadow, DPM      multivitamin-minerals  1 tablet Oral Daily Mamie Shadow, KILO      naloxone  0 04 mg Intravenous Q1MIN PRN Shanel Gone, CONNIE      nortriptyline  100 mg Oral HS Mamie Shadow, DPM      ondansetron  4 mg Intravenous Q4H PRN Shanel Gone, CONNIE      oxyCODONE  2 5 mg Oral Q4H PRN CONNIE Paredes      oxyCODONE  5 mg Oral Q4H PRN [employer], CRNP      polyethylene glycol  17 g Oral Daily PRN Zoey Rojas, CONNIE      potassium chloride  40 mEq Oral BID Alesia Gordillo PA-C      predniSONE  5 mg Oral Daily Hali Madrigal      rOPINIRole  2 mg Oral HS Soledad Vasquez DPM      senna-docusate sodium  1 tablet Oral BID [employer], CRNP          Today, Patient Was Seen By: Parvin Chen PA-C    **Please Note: This note may have been constructed using a voice recognition system  **

## 2022-07-23 NOTE — SPEECH THERAPY NOTE
Speech-Language Pathology Bedside Swallow Evaluation      Patient Name: Maico Payment    Today's Date: 7/23/2022     Problem List  Principal Problem:    Osteomyelitis [facility])  Active Problems:    Anxiety    Hypothyroidism due to Hashimoto's thyroiditis    Rheumatoid arthritis involving multiple sites with positive rheumatoid factor [facility])      Past Medical History  Past Medical History:   Diagnosis Date    Acute blood loss anemia 9/9/2020    Aftercare following joint replacement 9/9/2020    Patient had right reversed total shoulder arthroplasty   Pain was controlled when he left the hospital       Anxiety     Arthritis     Depression     Disease of thyroid gland     HYPO    Femur neck fracture (HCC)     GERD (gastroesophageal reflux disease)     Hyperthyroidism     RESOLVED: [id number]    Osteoporosis     Polio     PVD (peripheral vascular disease) ([facility] [address] )     Right BBB/left ant fasc block     UTI (urinary tract infection)     Varicella infection     LAST ASSESSED: [id number]       Past Surgical History  Past Surgical History:   Procedure Laterality Date    APPENDECTOMY      HIP ARTHROPLASTY Left 11/27/2017    Procedure: REMOVAL IM NAIL CONVERSION TO TOTAL HIP ARTHROPLASTY POSTERIOR APPROACH;  Surgeon: Mello Garcia MD;  [facility];  Service: Orthopedics    HIP FRACTURE SURGERY      HIP SURGERY      both hips replaced;2013 left ,2014 right    JOINT REPLACEMENT Right     HIP TOTAL    [place] CONV PREV HIP SURG TO TOT HIP [place]  Left 10/4/2017    Procedure: [place] removal of left hip;  Surgeon: Mello Garcia MD;  [facility];  Service: Orthopedics    [place] RECONSTR TOTAL SHOULDER IMPLANT Right 9/2/2020    Procedure: ARTHROPLASTY SHOULDER REVERSE;  Surgeon: Rodrigo Francis MD;  [facility];  Service: Orthopedics    [place] [address] IMPLANT Left 6/1/2021    Procedure: ARTHROPLASTY SHOULDER REVERSE;  Surgeon: Rodrigo Francis MD;  [facility];  Service: Orthopedics    [place] WRIST [place] LIG Right 5/24/2022    Procedure: RELEASE CARPAL TUNNEL ENDOSCOPIC-right;  Surgeon: Myla Baeza MD;  [facility];  Service: Orthopedics    REVISION TOTAL HIP ARTHROPLASTY Right     TONSILLECTOMY         Summary  Pt presented with s/s suggestive of mild oropharyngeal dysphagia  Symptoms or concerns included decreased mastication, suspected decreased hyolaryngeal elevation upon palpation and multiple swallows  Pt had the most difficulty with roast beef, which caused occasional dry cough during her lunch today  No overt s/s aspiration with thin liquids  She did reportedly have difficulty with meds, pt feels because she tried to take them all at once  She was agreeable to suggested diet downgrade to dysphagia 3 dental soft, and is willing to try meds with apple sauce  Risk/s for Aspiration: mild    Recommended Diet: soft/level 3 diet and thin liquids   Recommended Form of Meds: whole with puree   Aspiration precautions and swallowing strategies: upright posture, slow rate of feeding, small bites/sips and alternating bites and sips  Other Recommendations: Continue frequent oral care, full feed      Current Medical Status per H&P 7/21/22  Dwayne Pacheco is a 65 y o  female with pmh of Rheumatoid arthritis, restless legs syndrome, anxiety, hypothyroidism and age-related osteoporosis who presents with left foot  wound  She states that she has had this wound for approximately 2 months, which started as a small callus on the bottom of her foot  She states that over the past 2 months she noticed the wound has slowly increased in size and depth  At home her niece cares for her and dresses her wound for her daily  Patient admits to chills,  however denies fever, nausea, vomiting, shortness of breath, chest pain  She is very anxious during exam and gathering her medical history   She also states that her restless legs make it difficult for her to sit still when being examined, which only increases her anxiety  Social/Education/Vocational Hx:  Pt lives with family (sister)    Swallow Information   Current Symptoms/Concerns: coughing with po  Current Diet: regular diet and thin liquids   Baseline Diet: regular diet and thin liquids (pt stated she eats soft or cut up foods)      Baseline Assessment   Behavior/Cognition: alert  Speech/Language Status: able to participate in conversation and able to follow commands  Patient Positioning: upright in bed  Pain Status/Interventions/Response to Interventions: No report of or nonverbal indications of pain  Swallow Mechanism Exam  Facial: symmetrical  Labial: WFL  Lingual: WFL  Velum: Asymmetrical, greater elevation R side  Mandible: adequate ROM  Dentition: some teeth missing  Vocal quality:clear/adequate   Volitional Cough: strong/productive   Respiratory Status: on RA     Consistencies Assessed and Performance   Consistencies Administered: thin liquids, puree and hard solids    Oral Stage: mild, decreased mastication and oral residue with solids     Pharyngeal Stage: mild, suspected decreased hyolaryngeal elevation upon palpation, multiple swallows and effortful swallow with solids  Dry cough noted with roast beef  Esophageal Concerns: none reported      Summary and Recommendations (see above)    Results Reviewed with: patient, RN and MD     Treatment Recommended: yes     Frequency of treatment: 1-2x/week    Dysphagia LTG  -Patient will demonstrate safe and effective oral intake (without overt s/s significant oral/pharyngeal dysphagia including s/s penetration or aspiration) for the highest appropriate diet level  Short Term Goals:  -Pt will tolerate Dysphagia 3/advanced (dental soft) diet and thin liquid with no significant s/s oral or pharyngeal dysphagia across 1-3 diagnostic sessions

## 2022-07-24 ENCOUNTER — ANESTHESIA (INPATIENT)
Dept: PERIOP | Facility: HOSPITAL | Age: 77
DRG: 464 | End: 2022-07-24
Payer: MEDICARE

## 2022-07-24 ENCOUNTER — ANESTHESIA EVENT (INPATIENT)
Dept: PERIOP | Facility: HOSPITAL | Age: 77
DRG: 464 | End: 2022-07-24
Payer: MEDICARE

## 2022-07-24 ENCOUNTER — APPOINTMENT (INPATIENT)
Dept: RADIOLOGY | Facility: HOSPITAL | Age: 77
DRG: 464 | End: 2022-07-24
Payer: MEDICARE

## 2022-07-24 LAB
BACTERIA WND AEROBE CULT: ABNORMAL
GRAM STN SPEC: ABNORMAL
GRAM STN SPEC: ABNORMAL

## 2022-07-24 PROCEDURE — 87116 MYCOBACTERIA CULTURE: CPT | Performed by: PODIATRIST

## 2022-07-24 PROCEDURE — 87206 SMEAR FLUORESCENT/ACID STAI: CPT | Performed by: PODIATRIST

## 2022-07-24 PROCEDURE — 99232 SBSQ HOSP IP/OBS MODERATE 35: CPT | Performed by: PHYSICIAN ASSISTANT

## 2022-07-24 PROCEDURE — 87186 SC STD MICRODIL/AGAR DIL: CPT | Performed by: PODIATRIST

## 2022-07-24 PROCEDURE — 73630 X-RAY EXAM OF FOOT: CPT

## 2022-07-24 PROCEDURE — 87102 FUNGUS ISOLATION CULTURE: CPT | Performed by: PODIATRIST

## 2022-07-24 PROCEDURE — 0HXNXZZ TRANSFER LEFT FOOT SKIN, EXTERNAL APPROACH: ICD-10-PCS | Performed by: PODIATRIST

## 2022-07-24 PROCEDURE — NC001 PR NO CHARGE: Performed by: PODIATRIST

## 2022-07-24 PROCEDURE — 14040 TIS TRNFR F/C/C/M/N/A/G/H/F: CPT | Performed by: PODIATRIST

## 2022-07-24 PROCEDURE — 0QBP0ZZ EXCISION OF LEFT METATARSAL, OPEN APPROACH: ICD-10-PCS | Performed by: PODIATRIST

## 2022-07-24 PROCEDURE — 87070 CULTURE OTHR SPECIMN AEROBIC: CPT | Performed by: PODIATRIST

## 2022-07-24 PROCEDURE — 87075 CULTR BACTERIA EXCEPT BLOOD: CPT | Performed by: PODIATRIST

## 2022-07-24 PROCEDURE — 28113 PART REMOVAL OF METATARSAL: CPT | Performed by: PODIATRIST

## 2022-07-24 PROCEDURE — 87205 SMEAR GRAM STAIN: CPT | Performed by: PODIATRIST

## 2022-07-24 RX ORDER — FENTANYL CITRATE 50 UG/ML
INJECTION, SOLUTION INTRAMUSCULAR; INTRAVENOUS AS NEEDED
Status: DISCONTINUED | OUTPATIENT
Start: 2022-07-24 | End: 2022-07-24

## 2022-07-24 RX ORDER — MAGNESIUM HYDROXIDE 1200 MG/15ML
LIQUID ORAL AS NEEDED
Status: DISCONTINUED | OUTPATIENT
Start: 2022-07-24 | End: 2022-07-24 | Stop reason: HOSPADM

## 2022-07-24 RX ORDER — LIDOCAINE HYDROCHLORIDE 10 MG/ML
INJECTION, SOLUTION EPIDURAL; INFILTRATION; INTRACAUDAL; PERINEURAL AS NEEDED
Status: DISCONTINUED | OUTPATIENT
Start: 2022-07-24 | End: 2022-07-24

## 2022-07-24 RX ORDER — ACETAMINOPHEN 325 MG/1
975 TABLET ORAL EVERY 8 HOURS SCHEDULED
Status: DISCONTINUED | OUTPATIENT
Start: 2022-07-24 | End: 2022-08-03 | Stop reason: HOSPADM

## 2022-07-24 RX ORDER — FENTANYL CITRATE/PF 50 MCG/ML
25 SYRINGE (ML) INJECTION
Status: DISCONTINUED | OUTPATIENT
Start: 2022-07-24 | End: 2022-08-03 | Stop reason: HOSPADM

## 2022-07-24 RX ORDER — PROPOFOL 10 MG/ML
INJECTION, EMULSION INTRAVENOUS CONTINUOUS PRN
Status: DISCONTINUED | OUTPATIENT
Start: 2022-07-24 | End: 2022-07-24

## 2022-07-24 RX ORDER — BUPIVACAINE HYDROCHLORIDE 2.5 MG/ML
INJECTION, SOLUTION EPIDURAL; INFILTRATION; INTRACAUDAL AS NEEDED
Status: DISCONTINUED | OUTPATIENT
Start: 2022-07-24 | End: 2022-07-24 | Stop reason: HOSPADM

## 2022-07-24 RX ORDER — PROPOFOL 10 MG/ML
INJECTION, EMULSION INTRAVENOUS AS NEEDED
Status: DISCONTINUED | OUTPATIENT
Start: 2022-07-24 | End: 2022-07-24

## 2022-07-24 RX ORDER — ONDANSETRON 2 MG/ML
4 INJECTION INTRAMUSCULAR; INTRAVENOUS ONCE AS NEEDED
Status: DISCONTINUED | OUTPATIENT
Start: 2022-07-24 | End: 2022-07-24

## 2022-07-24 RX ORDER — SODIUM CHLORIDE, SODIUM LACTATE, POTASSIUM CHLORIDE, CALCIUM CHLORIDE 600; 310; 30; 20 MG/100ML; MG/100ML; MG/100ML; MG/100ML
75 INJECTION, SOLUTION INTRAVENOUS CONTINUOUS
Status: DISCONTINUED | OUTPATIENT
Start: 2022-07-24 | End: 2022-07-28

## 2022-07-24 RX ORDER — HYDROMORPHONE HCL/PF 1 MG/ML
0.5 SYRINGE (ML) INJECTION EVERY 4 HOURS PRN
Status: DISCONTINUED | OUTPATIENT
Start: 2022-07-24 | End: 2022-07-25

## 2022-07-24 RX ORDER — SODIUM CHLORIDE, SODIUM LACTATE, POTASSIUM CHLORIDE, CALCIUM CHLORIDE 600; 310; 30; 20 MG/100ML; MG/100ML; MG/100ML; MG/100ML
INJECTION, SOLUTION INTRAVENOUS CONTINUOUS PRN
Status: DISCONTINUED | OUTPATIENT
Start: 2022-07-24 | End: 2022-07-24

## 2022-07-24 RX ADMIN — LEVOTHYROXINE SODIUM 25 MCG: 25 TABLET ORAL at 11:09

## 2022-07-24 RX ADMIN — METHOCARBAMOL TABLETS 750 MG: 750 TABLET, COATED ORAL at 18:10

## 2022-07-24 RX ADMIN — HEPARIN SODIUM 5000 UNITS: 5000 INJECTION INTRAVENOUS; SUBCUTANEOUS at 21:15

## 2022-07-24 RX ADMIN — HEPARIN SODIUM 5000 UNITS: 5000 INJECTION INTRAVENOUS; SUBCUTANEOUS at 13:12

## 2022-07-24 RX ADMIN — ACETAMINOPHEN 975 MG: 325 TABLET, FILM COATED ORAL at 21:14

## 2022-07-24 RX ADMIN — FENTANYL CITRATE 25 MCG: 50 INJECTION INTRAMUSCULAR; INTRAVENOUS at 08:25

## 2022-07-24 RX ADMIN — HYDROMORPHONE HYDROCHLORIDE 0.5 MG: 1 INJECTION, SOLUTION INTRAMUSCULAR; INTRAVENOUS; SUBCUTANEOUS at 21:33

## 2022-07-24 RX ADMIN — FENTANYL CITRATE 25 MCG: 50 INJECTION INTRAMUSCULAR; INTRAVENOUS at 08:20

## 2022-07-24 RX ADMIN — MULTIPLE VITAMINS W/ MINERALS TAB 1 TABLET: TAB ORAL at 11:07

## 2022-07-24 RX ADMIN — ONDANSETRON 4 MG: 2 INJECTION INTRAMUSCULAR; INTRAVENOUS at 08:39

## 2022-07-24 RX ADMIN — SODIUM CHLORIDE, SODIUM LACTATE, POTASSIUM CHLORIDE, AND CALCIUM CHLORIDE 75 ML/HR: .6; .31; .03; .02 INJECTION, SOLUTION INTRAVENOUS at 11:50

## 2022-07-24 RX ADMIN — MELATONIN 6 MG: at 21:15

## 2022-07-24 RX ADMIN — NORTRIPTYLINE HYDROCHLORIDE 100 MG: 50 CAPSULE ORAL at 21:15

## 2022-07-24 RX ADMIN — Medication 100 MCG: at 08:31

## 2022-07-24 RX ADMIN — LIDOCAINE HYDROCHLORIDE 40 MG: 10 INJECTION, SOLUTION EPIDURAL; INFILTRATION; INTRACAUDAL; PERINEURAL at 08:17

## 2022-07-24 RX ADMIN — Medication 375 MG: at 11:16

## 2022-07-24 RX ADMIN — OXYCODONE HYDROCHLORIDE 5 MG: 5 TABLET ORAL at 15:30

## 2022-07-24 RX ADMIN — FENTANYL CITRATE 25 MCG: 50 INJECTION INTRAMUSCULAR; INTRAVENOUS at 08:39

## 2022-07-24 RX ADMIN — MELOXICAM 7.5 MG: 7.5 TABLET ORAL at 11:17

## 2022-07-24 RX ADMIN — OXYCODONE HYDROCHLORIDE 5 MG: 5 TABLET ORAL at 11:03

## 2022-07-24 RX ADMIN — HYDROMORPHONE HYDROCHLORIDE 0.5 MG: 1 INJECTION, SOLUTION INTRAMUSCULAR; INTRAVENOUS; SUBCUTANEOUS at 12:49

## 2022-07-24 RX ADMIN — CEFAZOLIN SODIUM 2000 MG: 2 SOLUTION INTRAVENOUS at 13:50

## 2022-07-24 RX ADMIN — HYDROXYZINE HYDROCHLORIDE 25 MG: 25 TABLET ORAL at 20:29

## 2022-07-24 RX ADMIN — FERROUS GLUCONATE 324 MG: 324 TABLET ORAL at 11:18

## 2022-07-24 RX ADMIN — FENTANYL CITRATE 25 MCG: 50 INJECTION INTRAMUSCULAR; INTRAVENOUS at 08:17

## 2022-07-24 RX ADMIN — SODIUM CHLORIDE, SODIUM LACTATE, POTASSIUM CHLORIDE, AND CALCIUM CHLORIDE: .6; .31; .03; .02 INJECTION, SOLUTION INTRAVENOUS at 08:05

## 2022-07-24 RX ADMIN — OXYCODONE HYDROCHLORIDE 5 MG: 5 TABLET ORAL at 20:34

## 2022-07-24 RX ADMIN — FOLIC ACID 2000 MCG: 1 TABLET ORAL at 11:08

## 2022-07-24 RX ADMIN — ROPINIROLE HYDROCHLORIDE 2 MG: 1 TABLET, FILM COATED ORAL at 21:14

## 2022-07-24 RX ADMIN — PROPOFOL 20 MG: 10 INJECTION, EMULSION INTRAVENOUS at 08:17

## 2022-07-24 RX ADMIN — SENNOSIDES AND DOCUSATE SODIUM 1 TABLET: 8.6; 5 TABLET ORAL at 18:10

## 2022-07-24 RX ADMIN — CEFAZOLIN SODIUM 2000 MG: 2 SOLUTION INTRAVENOUS at 06:31

## 2022-07-24 RX ADMIN — PREDNISONE 5 MG: 5 TABLET ORAL at 11:08

## 2022-07-24 RX ADMIN — CEFAZOLIN SODIUM 2000 MG: 2 SOLUTION INTRAVENOUS at 21:14

## 2022-07-24 RX ADMIN — PROPOFOL 60 MCG/KG/MIN: 10 INJECTION, EMULSION INTRAVENOUS at 08:17

## 2022-07-24 NOTE — ASSESSMENT & PLAN NOTE
Presented to the ED with increased L foot pain  Patient with left plantar foot ulceration, admitted to Podiatry service    · XR concerning for bone involvement   · Currently on IV Ancef   · Wound culture grew staph aureus   · Local wound care per primary   · S/p OR resection of 5th Metatarsal head today 7/24  · Follow-up OR cultures   · Recommend d/c antibiotics if surgical cure achieved   · Analgesics as needed   · PT/OT postoperatively

## 2022-07-24 NOTE — ANESTHESIA POSTPROCEDURE EVALUATION
Post-Op Assessment Note    CV Status:  Stable  Pain Score: 0    Pain management: adequate     Mental Status:  Alert and awake   Hydration Status:  Euvolemic   PONV Controlled:  Controlled   Airway Patency:  Patent      Post Op Vitals Reviewed: Yes      Staff: CRNA         No complications documented      /64 (07/24/22 0910)    Temp 97 7 °F (36 5 °C) (07/24/22 0910)    Pulse 74 (07/24/22 0910)   Resp 15 (07/24/22 0910)    SpO2 99 % (07/24/22 0910)

## 2022-07-24 NOTE — OP NOTE
OPERATIVE REPORT - Podiatry  PATIENT NAME: Abdiel Rivera    :  1945  MRN: [id number]  Pt Location: [facility] OR ROOM 07    SURGERY DATE: 2022    Surgeon(s) and Role:     * Alli Blevins DPM - Primary     * Philip Boothe DPM - Assisting    Pre-op Diagnosis:  Osteomyelitis of left foot, unspecified type ([address] ) [M86 9]    Post-Op Diagnosis Codes:     * Osteomyelitis of left foot, unspecified type ([address] ) [M86 9]    Procedure(s) (LRB):  5TH METATARSAL RESECTION WITH BOTTOM FLAP CLOSURE (Left)    Specimen(s):  ID Type Source Tests Collected by Time Destination   A : left 5th metatarsal bone Tissue Bone ANAEROBIC CULTURE AND GRAM STAIN, FUNGAL CULTURE, CULTURE, TISSUE AND GRAM STAIN, AFB CULTURE WITH STAIN Alli Blevins DPM 2022 [id number]        Estimated Blood Loss:   Minimal    Drains:  Closed/Suction Drain Left;Posterior; Lateral Foot Bulb 10 Fr  (Active)   Number of days: 3       Anesthesia Type:   Choice with 14 ml of 0 5% Bupivacaine and 1% Lidocaine in a 1:1 mixture    Hemostasis:  Pneumatic ankle tourniquet, anatomic dissection, electrocautery, manual compression    Materials:  * No implants in log *  3-0 Nylon  RISHI drain    Operative Findings:  Consistent with diagnosis - remaining bone and soft tissue showed no signs of necrosis or purulence    Complications:   None    Procedure and Technique:     Under mild sedation, the patient was brought into the operating room and placed on the operating room table in the supine position  IV sedation was achieved by anesthesia team and a universal timeout was performed where all parties are in agreement of correct patient, correct procedure and correct site  A pneumatic tourniquet was then placed over the patient's left lower extremity with ample padding  A local block was performed consisting of 10 ml of 0 5% Bupivacaine and 1% Lidocaine in a 1:1 mixture  The foot was then prepped and draped in the usual aseptic manner   An esmarch bandage was used to [employer] the foot and the pneumatic tourniquet was then inflated to 250mmHg  Attention was directed to the left plantar foot  Incision for bi- lobe flap and excision of wound of plantar 5th metatarsal head was drawn out with skin marker  15 blade was used to make full thickness incision surrounding wound and soft tissue was removed to expose metatarsal head  15 blade was used to free up soft tissue surrounding 5th metatarsal head and sagittal saw was used to make bone cut in a plantar proximal to dorsal distal orientation  Severed bone was removed from the foot and passed off the field for pathological examination  No areas of necrosis or tracts of purulence were visualized  The remaining proximal bone was hard, white, and bleeding  Surrounding soft tissue showed ample bleeding as well  Pulse lavage was used to irrigate surgical site with copious amounts of normal saline  Small dorsal incision was made at the 5th MTPJ for insertion of RISHI drain into the dead space resulting from metatarsal head resection  The drain was then secured dorsally with 3-0 Nylon  Incision was made for bi-lobe flap using 15 blade and care was taken to preserve as much subcutaneous tissue underneath  Any aggressively bleeding vessels were cauterized using bovie  Flap was translated laterally and approximated with the wound excision  3-0 Nylon was used to obtain skin closure of flap with the wound excision in a simple interrupted fashion  3-0 Nylon was also used to obtain primary closure of skin of the medial aspect of flap incision in a simple interrupted fashion  Additional 4mL of local anesthetic was injected just proximal to the surgical site  The tourniquet was deflated at approximately 21 min and normal hyperemic response was noted to all digits as well as the bi- lobe flap  Dressed with xeroform, 4x4 gauze, Webril, Kerlix, and ACE bandage       The patient tolerated the procedure and anesthesia well without immediate complications and transferred to PACU with vital signs stable  As with many limb salvage procedures, we contemplate the possibility of performing further stages to this procedure  Procedures may include debridements, delayed closure, plastic surgery techniques, or more proximal amputations  This procedure may be considered part of a multi-staged limb salvage treatment plan  Dr Nissa Blackburn was present during the entire procedure and participated in all key aspects  SIGNATURE: Alexia Jennings DPM  DATE: July 24, 2022  TIME: 9:17 AM      Portions of the record may have been created with voice recognition software  Occasional wrong word or "sound a like" substitutions may have occurred due to the inherent limitations of voice recognition software  Read the chart carefully and recognize, using context, where substitutions have occurred

## 2022-07-24 NOTE — ASSESSMENT & PLAN NOTE
Just an FYI -    Our OON review team recommend to reach out to pt's insurance to see if they will approve for services at [facility]. I submitted records and the request 2 weeks ago.    Today I received a letter from pt's insurance, [employer]. They will NOT approve for services to [facility]. [facility] not [facility] for [facility] at [facility] because care is available to patient within his network. [facility] is not part of pt's benefit plan network. If pt is to proceed with services, pt will be responsible for the cost.    · Atarax ordered PRN   · Would avoid benzos if possible

## 2022-07-24 NOTE — PROGRESS NOTES
Podiatry - Progress Note  Patient: Aldair Mast Colon 65 y o  female   MRN: [id number]  PCP: Jean Reddy MD  Unit/Bed#: -58 Encounter: [id number]  Date Of Visit: 22    ASSESSMENT:    Ced Gonsalez is a 65 y o  female with:    1  Left Plantar 5th metatarsal head ulceration- Hernandez 3  2  Left leg VSU  3  Osteomyelitis  4  Chronic venous hypertension  5  Rheumatoid Arthritis   6  Restless Leg Syndrome    PLAN:    · Patient to go to OR today,22, for left Partial 5th ray amputation with possible flap closure with Dr Nedra Valdivia  Patient is agreeable to procedure  · Consent to be signed with surgeon prior to procedure  · Confirmed NPO status  · H&P, vitals, and current labs reviewed  No acute changes noted  · Alternatives, risks, and complications discussed with patient  · All questions answered  No guarantees given of outcome  · Rest of medical care per primary team        SUBJECTIVE:     The patient was seen, evaluated, and assessed at bedside today  The patient was awake, alert, and in no acute distress  Patient confirmed NPO status  All questions and concerns regarding the surgical procedure addressed  Patient understands risks vs benefits of procedure and remains amenable with plan for surgery today  Patient denies N/V/F/chills/SOB/CP  OBJECTIVE:     Vitals:   /67   Pulse 79   Temp 98 °F (36 7 °C)   Resp 18   Wt 45 4 kg (100 lb)   SpO2 98%   BMI 20 90 kg/m²     Temp (24hrs), Av 8 °F (36 6 °C), Min:97 6 °F (36 4 °C), Max:98 °F (36 7 °C)      Physical Exam:     General:  Alert, cooperative, and in no distress  Lower extremity exam:  No physical exam was performed today  Patient is agreeable to procedure  NPO confirmed  Dressing left intact to the Operating Room       Additional Data:     Labs:    Results from last 7 days   Lab Units 22  0608   WBC Thousand/uL 4 38   HEMOGLOBIN g/dL 9 4*   HEMATOCRIT % 30 7*   PLATELETS Thousands/uL 369   NEUTROS PCT % 66   LYMPHS PCT % 20   MONOS PCT % 10   EOS PCT % 2     Results from last 7 days   Lab Units 07/23/22  0608 07/21/22 2020   POTASSIUM mmol/L 3 0* 3 3*   CHLORIDE mmol/L 106 108   CO2 mmol/L 29 25   BUN mg/dL 11 8   CREATININE mg/dL 0 41* 0 45*   CALCIUM mg/dL 8 1* 8 8   ALK PHOS U/L  --  85   ALT U/L  --  25   AST U/L  --  34           * I Have Reviewed All Lab Data Listed Above  Recent Cultures (last 7 days):     Results from last 7 days   Lab Units 07/21/22 1950   GRAM STAIN RESULT  Rare Gram positive cocci in pairs*  No polys seen*   WOUND CULTURE  2+ Growth of Staphylococcus aureus*  1+ Growth of - Acinetobacter pittii  / Acinetobacter*           Imaging: I have personally reviewed pertinent films in PACS  EKG, Pathology, and Other Studies: I have personally reviewed pertinent reports  ** Please Note: Portions of the record may have been created with voice recognition software  Occasional wrong word or "sound a like" substitutions may have occurred due to the inherent limitations of voice recognition software  Read the chart carefully and recognize, using context, where substitutions have occurred   **

## 2022-07-24 NOTE — ANESTHESIA PREPROCEDURE EVALUATION
Procedure:  AMPUTATION TOE (Left Toe)    Relevant Problems   CARDIO   (+) RBBB      ENDO   (+) Hypothyroidism due to Hashimoto's thyroiditis      MUSCULOSKELETAL   (+) Rheumatoid arthritis involving multiple sites with positive rheumatoid factor (HCC)      NEURO/PSYCH   (+) Anxiety   (+) Chronic pain syndrome   (+) Depression      Musculoskeletal and Integument   (+) Acquired subluxation of left shoulder   (+) Closed compression fracture of L3 lumbar vertebra   (+) Rupture long head biceps tendon, left, initial encounter      Other   (+) Ambulatory dysfunction   (+) Dyspepsia   (+) Osteomyelitis (HCC)   (+) Other forms of systemic lupus erythematosus (HCC)   (+) RLS (restless legs syndrome)   (+) S/P reverse total shoulder arthroplasty, left   (+) S/P total hip arthroplasty   (+) Shoulder pain, bilateral      Lab Results   Component Value Date     (L) 10/30/2014    SODIUM 141 07/23/2022    K 3 0 (L) 07/23/2022     07/23/2022    CO2 29 07/23/2022    ANIONGAP 4 10/30/2014    AGAP 6 07/23/2022    BUN 11 07/23/2022    CREATININE 0 41 (L) 07/23/2022    GLUC 91 07/23/2022    GLUF 80 06/08/2022    CALCIUM 8 1 (L) 07/23/2022    AST 34 07/21/2022    ALT 25 07/21/2022    ALKPHOS 85 07/21/2022    PROT 6 9 12/16/2014    TP 6 7 07/21/2022    BILITOT 0 38 10/13/2014    TBILI 0 37 07/21/2022    EGFR 99 07/23/2022     Lab Results   Component Value Date    WBC 4 38 07/23/2022    HGB 9 4 (L) 07/23/2022    HCT 30 7 (L) 07/23/2022    MCV 89 07/23/2022     07/23/2022       Physical Exam    Airway    Mallampati score: II  TM Distance: >3 FB  Neck ROM: full     Dental       Cardiovascular      Pulmonary      Other Findings        Anesthesia Plan  ASA Score- 3     Anesthesia Type- IV sedation with anesthesia with ASA Monitors  Additional Monitors:   Airway Plan:           Plan Factors-Exercise tolerance (METS): >4 METS  Chart reviewed  EKG reviewed  Imaging results reviewed  Existing labs reviewed   Patient summary reviewed  Induction- intravenous  Postoperative Plan- Plan for postoperative opioid use  Informed Consent- Anesthetic plan and risks discussed with patient  I personally reviewed this patient with the CRNA  Discussed and agreed on the Anesthesia Plan with the CRNA  Karrie Nissen

## 2022-07-25 PROBLEM — E87.1 HYPONATREMIA: Status: ACTIVE | Noted: 2022-07-25

## 2022-07-25 LAB
ANION GAP SERPL CALCULATED.3IONS-SCNC: 7 MMOL/L (ref 4–13)
BUN SERPL-MCNC: 8 MG/DL (ref 5–25)
CALCIUM SERPL-MCNC: 7.7 MG/DL (ref 8.3–10.1)
CHLORIDE SERPL-SCNC: 103 MMOL/L (ref 96–108)
CO2 SERPL-SCNC: 24 MMOL/L (ref 21–32)
CREAT SERPL-MCNC: 0.25 MG/DL (ref 0.6–1.3)
GFR SERPL CREATININE-BSD FRML MDRD: 116 ML/MIN/1.73SQ M
GLUCOSE SERPL-MCNC: 70 MG/DL (ref 65–140)
POTASSIUM SERPL-SCNC: 4 MMOL/L (ref 3.5–5.3)
SODIUM SERPL-SCNC: 134 MMOL/L (ref 135–147)

## 2022-07-25 PROCEDURE — 80048 BASIC METABOLIC PNL TOTAL CA: CPT

## 2022-07-25 PROCEDURE — 97163 PT EVAL HIGH COMPLEX 45 MIN: CPT

## 2022-07-25 PROCEDURE — 99232 SBSQ HOSP IP/OBS MODERATE 35: CPT | Performed by: PHYSICIAN ASSISTANT

## 2022-07-25 PROCEDURE — NC001 PR NO CHARGE: Performed by: PODIATRIST

## 2022-07-25 PROCEDURE — 97167 OT EVAL HIGH COMPLEX 60 MIN: CPT

## 2022-07-25 RX ORDER — OXYCODONE HYDROCHLORIDE 5 MG/1
7.5 TABLET ORAL EVERY 4 HOURS PRN
Status: DISCONTINUED | OUTPATIENT
Start: 2022-07-25 | End: 2022-07-28

## 2022-07-25 RX ORDER — OXYCODONE HYDROCHLORIDE 5 MG/1
5 TABLET ORAL EVERY 4 HOURS PRN
Status: DISCONTINUED | OUTPATIENT
Start: 2022-07-25 | End: 2022-08-03 | Stop reason: HOSPADM

## 2022-07-25 RX ADMIN — LEVOTHYROXINE SODIUM 25 MCG: 25 TABLET ORAL at 08:16

## 2022-07-25 RX ADMIN — MULTIPLE VITAMINS W/ MINERALS TAB 1 TABLET: TAB ORAL at 08:16

## 2022-07-25 RX ADMIN — HYDROMORPHONE HYDROCHLORIDE 0.5 MG: 1 INJECTION, SOLUTION INTRAMUSCULAR; INTRAVENOUS; SUBCUTANEOUS at 08:17

## 2022-07-25 RX ADMIN — SENNOSIDES AND DOCUSATE SODIUM 1 TABLET: 8.6; 5 TABLET ORAL at 08:16

## 2022-07-25 RX ADMIN — METHOCARBAMOL TABLETS 750 MG: 750 TABLET, COATED ORAL at 21:56

## 2022-07-25 RX ADMIN — HEPARIN SODIUM 5000 UNITS: 5000 INJECTION INTRAVENOUS; SUBCUTANEOUS at 05:11

## 2022-07-25 RX ADMIN — OXYCODONE HYDROCHLORIDE 7.5 MG: 5 TABLET ORAL at 10:03

## 2022-07-25 RX ADMIN — MORPHINE SULFATE 2 MG: 2 INJECTION, SOLUTION INTRAMUSCULAR; INTRAVENOUS at 17:43

## 2022-07-25 RX ADMIN — CEFAZOLIN SODIUM 2000 MG: 2 SOLUTION INTRAVENOUS at 05:12

## 2022-07-25 RX ADMIN — ACETAMINOPHEN 975 MG: 325 TABLET, FILM COATED ORAL at 14:33

## 2022-07-25 RX ADMIN — HEPARIN SODIUM 5000 UNITS: 5000 INJECTION INTRAVENOUS; SUBCUTANEOUS at 14:33

## 2022-07-25 RX ADMIN — PREDNISONE 5 MG: 5 TABLET ORAL at 08:16

## 2022-07-25 RX ADMIN — ACETAMINOPHEN 975 MG: 325 TABLET, FILM COATED ORAL at 21:55

## 2022-07-25 RX ADMIN — Medication 375 MG: at 08:17

## 2022-07-25 RX ADMIN — MELATONIN 6 MG: at 21:56

## 2022-07-25 RX ADMIN — METHOCARBAMOL TABLETS 750 MG: 750 TABLET, COATED ORAL at 08:16

## 2022-07-25 RX ADMIN — OXYCODONE HYDROCHLORIDE 5 MG: 5 TABLET ORAL at 05:12

## 2022-07-25 RX ADMIN — MELOXICAM 7.5 MG: 7.5 TABLET ORAL at 08:16

## 2022-07-25 RX ADMIN — HYDROXYCHLOROQUINE SULFATE 200 MG: 200 TABLET ORAL at 08:16

## 2022-07-25 RX ADMIN — FERROUS GLUCONATE 324 MG: 324 TABLET ORAL at 08:16

## 2022-07-25 RX ADMIN — NORTRIPTYLINE HYDROCHLORIDE 100 MG: 50 CAPSULE ORAL at 21:57

## 2022-07-25 RX ADMIN — ROPINIROLE HYDROCHLORIDE 2 MG: 1 TABLET, FILM COATED ORAL at 21:55

## 2022-07-25 RX ADMIN — HEPARIN SODIUM 5000 UNITS: 5000 INJECTION INTRAVENOUS; SUBCUTANEOUS at 21:56

## 2022-07-25 RX ADMIN — METHOCARBAMOL TABLETS 750 MG: 750 TABLET, COATED ORAL at 14:33

## 2022-07-25 RX ADMIN — OXYCODONE HYDROCHLORIDE 7.5 MG: 5 TABLET ORAL at 14:34

## 2022-07-25 RX ADMIN — FOLIC ACID 2000 MCG: 1 TABLET ORAL at 08:16

## 2022-07-25 RX ADMIN — ACETAMINOPHEN 975 MG: 325 TABLET, FILM COATED ORAL at 05:12

## 2022-07-25 RX ADMIN — OXYCODONE HYDROCHLORIDE 5 MG: 5 TABLET ORAL at 21:56

## 2022-07-25 NOTE — PROGRESS NOTES
Podiatry - Progress Note  Patient: Yumiko Vazquez Colon 65 y o  female   MRN: [id number]  PCP: Wayne Hernandez MD  Unit/Bed#: -84 Encounter: [id number]  Date Of Visit: 22    ASSESSMENT:    Maryuri Lema is a 65 y o  female with:    1  Left Plantar 5th metatarsal head ulceration with underlying osteomyleitis- Hernandez 3-POA  - S/p left partial 5th ray resection with flap closure (DOS 22)  2  Left leg venous stasis ulcer-POA  3  Chronic venous hypertension  4  Rheumatoid Arthritis   5  Hypothyroidism due to Hashimoto's disease  6  Restless Leg Syndrome        PLAN:    · Post-surgical dressing located on surgical site and affected extremity were left intact today  · Will plan for first complete post-surgical dressing change tomorrow  · Pain is well controlled  Continue current pain management regimen  · Elevation on green foam wedges or pillows when non-ambulatory  · Rest of care per primary team     Weightbearing status: Non-weightbearing left  foot       SUBJECTIVE:     The patient was seen, evaluated, and assessed at bedside today  The patient was awake, alert, and in no acute distress  The patient reports moderate-severe pain at this time  Pain is well controlled with current pain management regimen  Patient reports normal appetite and using the restroom postoperatively  Patient denies N/V/F/chills/SOB/CP  OBJECTIVE:     Vitals:   /66   Pulse 85   Temp 98 1 °F (36 7 °C)   Resp 12   Ht 4' 10" (1 473 m)   Wt 45 4 kg (100 lb)   SpO2 95%   BMI 20 90 kg/m²     Temp (24hrs), Av 9 °F (36 6 °C), Min:97 7 °F (36 5 °C), Max:98 1 °F (36 7 °C)      Physical Exam:     General:  Alert, cooperative, and in no distress  POD1, left foot dressings left clean, dry and intact this morning without any strike through noted  Drain in tact with Serosanguineous fluid filling approximately 20% of drain      Additional Data:     Labs:    Results from last 7 days   Lab Units 22  0608   WBC Thousand/uL 4 38   HEMOGLOBIN g/dL 9 4*   HEMATOCRIT % 30 7*   PLATELETS Thousands/uL 369   NEUTROS PCT % 66   LYMPHS PCT % 20   MONOS PCT % 10   EOS PCT % 2     Results from last 7 days   Lab Units 07/25/22  0438 07/23/22  0608 07/21/22  2020   POTASSIUM mmol/L 4 0   < > 3 3*   CHLORIDE mmol/L 103   < > 108   CO2 mmol/L 24   < > 25   BUN mg/dL 8   < > 8   CREATININE mg/dL 0 25*   < > 0 45*   CALCIUM mg/dL 7 7*   < > 8 8   ALK PHOS U/L  --   --  85   ALT U/L  --   --  25   AST U/L  --   --  34    < > = values in this interval not displayed  * I Have Reviewed All Lab Data Listed Above  Recent Cultures (last 7 days):     Results from last 7 days   Lab Units 07/21/22  1950   GRAM STAIN RESULT  Rare Gram positive cocci in pairs*  No polys seen*   WOUND CULTURE  2+ Growth of Staphylococcus aureus*  1+ Growth of Pseudomonas aeruginosa*  1+ Growth of - Acinetobacter pittii  / Acinetobacter species*     Results from last 7 days   Lab Units 07/24/22  0839   ANAEROBIC CULTURE  Culture results to follow  Imaging: I have personally reviewed pertinent films in PACS  EKG, Pathology, and Other Studies: I have personally reviewed pertinent reports  ** Please Note: Portions of the record may have been created with voice recognition software  Occasional wrong word or "sound a like" substitutions may have occurred due to the inherent limitations of voice recognition software  Read the chart carefully and recognize, using context, where substitutions have occurred   **

## 2022-07-25 NOTE — PROGRESS NOTES
[facility]  Progress Note Mary Magñaa Colon 1945, 65 y o  female MRN: [id number]  Unit/Bed#: MS Ariza5-Steven Encounter: [id number]  Primary Care Provider: Antony Yin MD   Date and time admitted to hospital: 7/21/2022  5:59 PM    * Osteomyelitis [facility])  Assessment & Plan  Presented to the ED with increased L foot pain  Patient with left plantar foot ulceration, admitted to Podiatry service  · XR concerning for bone involvement   · Currently on IV Ancef   · Wound culture grew staph aureus   · Local wound care per primary   · S/p OR resection of 5th Metatarsal head today 7/24  · Follow-up OR cultures   · Recommend d/c antibiotics if surgical cure achieved, d/w podiatry   · Pain regimen: continue with scheduled Tylenol, increased prn oxy to 5-7 5 mg prn, change breakthrough from IV dilaudid to IV morphine   · PT/OT postoperatively     Hyponatremia  Assessment & Plan  · Mild, suspect dehydration in the setting of NPO for surgery   · Continue with IV fluids for now   · Encourage PO intake   · Monitor BMP    Rheumatoid arthritis involving multiple sites with positive rheumatoid factor (HCC)  Assessment & Plan  · Continue prednisone 5 mg daily and methotrexate per PTA medication regimen     Hypothyroidism due to Hashimoto's thyroiditis  Assessment & Plan  · Continue synthroid therapy     Anxiety  Assessment & Plan  · Atarax ordered PRN   · Would avoid benzos if possible       VTE Pharmacologic Prophylaxis: VTE Score: 6 High Risk (Score >/= 5) - Pharmacological DVT Prophylaxis Ordered: heparin  Sequential Compression Devices Ordered  Patient Centered Rounds: I performed bedside rounds with nursing staff today  Discussions with Specialists or Other Care Team Provider: primary RN, podiatry resident     Education and Discussions with Family / Patient: defer to primary team      Time Spent for Care: 15 minutes   More than 50% of total time spent on counseling and coordination of care as described above  Current Length of Stay: 4 day(s)  Current Patient Status: Inpatient   Certification Statement: The patient will continue to require additional inpatient hospital stay due to per primary   Discharge Plan: [facility] is following this patient on consult  They are medically stable for discharge when deemed appropriate by primary service  Code Status: Level 1 - Full Code    Subjective:   Patient reporting uncontrolled pain, requesting morphine  Advised I would adjust pain regimen  She also endorses poor sleep and anxiousness  Objective:     Vitals:   Temp (24hrs), Av °F (36 7 °C), Min:97 8 °F (36 6 °C), Max:98 1 °F (36 7 °C)    Temp:  [97 8 °F (36 6 °C)-98 1 °F (36 7 °C)] 98 °F (36 7 °C)  HR:  [68-85] 79  Resp:  [12] 12  BP: (120-131)/(60-77) 120/67  SpO2:  [94 %-98 %] 98 %  Body mass index is 20 9 kg/m²  Input and Output Summary (last 24 hours): Intake/Output Summary (Last 24 hours) at 2022 0946  Last data filed at 2022 0757  Gross per 24 hour   Intake 1300 ml   Output 75 ml   Net 1225 ml       Physical Exam:   Physical Exam  Vitals reviewed  Constitutional:       General: She is not in acute distress  Cardiovascular:      Rate and Rhythm: Normal rate  Heart sounds: No murmur heard  Pulmonary:      Effort: Pulmonary effort is normal  No respiratory distress  Skin:     Comments: Left foot dressing c/d/i  Noted drain with serosanguinous output    Neurological:      General: No focal deficit present  Mental Status: She is alert and oriented to person, place, and time  Mental status is at baseline           Additional Data:     Labs:  Results from last 7 days   Lab Units 22  0608   WBC Thousand/uL 4 38   HEMOGLOBIN g/dL 9 4*   HEMATOCRIT % 30 7*   PLATELETS Thousands/uL 369   NEUTROS PCT % 66   LYMPHS PCT % 20   MONOS PCT % 10   EOS PCT % 2     Results from last 7 days   Lab Units 22  0438 22  0608 22   SODIUM mmol/L 134*   < > 140   POTASSIUM mmol/L 4 0   < > 3 3*   CHLORIDE mmol/L 103   < > 108   CO2 mmol/L 24   < > 25   BUN mg/dL 8   < > 8   CREATININE mg/dL 0 25*   < > 0 45*   ANION GAP mmol/L 7   < > 7   CALCIUM mg/dL 7 7*   < > 8 8   ALBUMIN g/dL  --   --  2 8*   TOTAL BILIRUBIN mg/dL  --   --  0 37   ALK PHOS U/L  --   --  85   ALT U/L  --   --  25   AST U/L  --   --  34   GLUCOSE RANDOM mg/dL 70   < > 83    < > = values in this interval not displayed  Lines/Drains:  Invasive Devices  Report    Peripheral Intravenous Line  Duration           Peripheral IV 07/24/22 Left;Proximal;Ventral (anterior) Forearm 1 day          Drain  Duration           Closed/Suction Drain Left;Posterior; Lateral Foot Bulb 10 Fr  1 day                      Imaging: No pertinent imaging reviewed      Recent Cultures (last 7 days):   Results from last 7 days   Lab Units 07/24/22  0839 07/21/22  1950   GRAM STAIN RESULT  No Polys  No organisms seen Rare Gram positive cocci in pairs*  No polys seen*   WOUND CULTURE   --  2+ Growth of Staphylococcus aureus*  1+ Growth of Pseudomonas aeruginosa*  1+ Growth of - Acinetobacter pittii  / Acinetobacter species*       Last 24 Hours Medication List:   Current Facility-Administered Medications   Medication Dose Route Frequency Provider Last Rate    acetaminophen  975 mg Oral Q8H [facility] Alesia Gordillo PA-C      Ascorbic Acid (Vitamin C)  375 mg Oral Daily Rosetracey Kemp, DPM      fentaNYL  25 mcg Intravenous Q3 min PRN Leonor Tavera CRNA      ferrous gluconate  324 mg Oral Daily Rose Justo, DPJACEY      folic acid  5,655 mcg Oral Daily Rose Justo, DPM      heparin (porcine)  5,000 Units Subcutaneous [facility] RoseTREVON BrunoM      hydroxychloroquine  200 mg Oral Daily With Breakfast Rose Kemp DPM      hydrOXYzine HCL  25 mg Oral Q6H PRN Rose Kemp, DPJACEY      lactated ringers  75 mL/hr Intravenous Continuous Leonor Tavera CRNA 75 mL/hr (07/24/22 1150)    levothyroxine  25 mcg Oral Daily Ania Curling, DPM      melatonin  6 mg Oral HS Ania Curling, DPM      meloxicam  7 5 mg Oral Daily Ania Curling, DPM      methocarbamol  750 mg Oral Q6H PRN Ania Curling, DPM      methotrexate  2 5 mg Oral Weekly Ania Curling, DPM      morphine injection  2 mg Intravenous Q4H PRN Yong Bucco Held, PA-C      multivitamin-minerals  1 tablet Oral Daily Ania Curling, DPM      naloxone  0 04 mg Intravenous Q1MIN PRN Ania Curling, DPM      nortriptyline  100 mg Oral HS Ania Curling, DPM      ondansetron  4 mg Intravenous Q4H PRN Ania Curling, DPM      oxyCODONE  5 mg Oral Q4H PRN Alesia E Held, ANGELA      oxyCODONE  7 5 mg Oral Q4H PRN Alesia E Held, PA-SHAUN      polyethylene glycol  17 g Oral Daily PRN Ania Curling, DPM      predniSONE  5 mg Oral Daily Ania Curling, DPM      rOPINIRole  2 mg Oral HS Ania Curling, DPM      senna-docusate sodium  1 tablet Oral BID Ania Curling, DPM          Today, Patient Was Seen By: Aislinn Whitmore PA-C    **Please Note: This note may have been constructed using a voice recognition system  **

## 2022-07-25 NOTE — PHYSICAL THERAPY NOTE
Physical Therapy Evaluation     Patient's Name: Anni Rivera    Admitting Diagnosis  Acute osteomyelitis of left foot ([facility] [address] ) [[id number]]    Problem List  Patient Active Problem List   Diagnosis    Anxiety    RLS (restless legs syndrome)    S/P total hip arthroplasty    Hypothyroidism due to Hashimoto's thyroiditis    Age-related osteoporosis without current pathological fracture    RBBB    Ambulatory dysfunction    Closed compression fracture of L3 lumbar vertebra    Rheumatoid arthritis involving multiple sites with positive rheumatoid factor ([facility])    Chronic pain syndrome    Vitamin D deficiency    Dyspepsia    Other forms of systemic lupus erythematosus ([facility] [address] )    Depression    Acquired subluxation of left shoulder    Rupture long head biceps tendon, left, initial encounter    S/P reverse total shoulder arthroplasty, left    Encounter for annual wellness exam in Medicare patient    Shoulder pain, bilateral    [facility]    Venous insufficiency    Osteomyelitis ([facility] )    Hyponatremia       Past Medical History  Past Medical History:   Diagnosis Date    Acute blood loss anemia 9/9/2020    Aftercare following joint replacement 9/9/2020    Patient had right reversed total shoulder arthroplasty   Pain was controlled when he left the hospital       Anxiety     Arthritis     Depression     Disease of thyroid gland     HYPO    Femur neck fracture ([facility])     GERD (gastroesophageal reflux disease)     Hyperthyroidism     RESOLVED: [id number]    Osteoporosis     Polio     PVD (peripheral vascular disease) ([facility])     Right BBB/left ant fasc block     UTI (urinary tract infection)     Varicella infection     LAST ASSESSED: [id number]       Past Surgical History  Past Surgical History:   Procedure Laterality Date    APPENDECTOMY      HIP ARTHROPLASTY Left 11/27/2017    Procedure: REMOVAL IM NAIL CONVERSION TO TOTAL HIP ARTHROPLASTY POSTERIOR APPROACH;  Surgeon: Kvng Alvarez MD;  [facility];  Service: Orthopedics    HIP FRACTURE SURGERY      HIP SURGERY      both hips replaced;2013 left ,2014 right    JOINT REPLACEMENT Right     HIP TOTAL    VA CONV PREV HIP SURG TO TOT HIP ARTHROPLAS Left 10/4/2017    Procedure: Selinda Calle removal of left hip;  Surgeon: Minnie Fishman MD;  [facility];  Service: Orthopedics    VA RECONSTR TOTAL SHOULDER IMPLANT Right 9/2/2020    Procedure: ARTHROPLASTY SHOULDER REVERSE;  Surgeon: Tiffanie Baker MD;  [facility];  Service: Orthopedics    VA RECONSTR TOTAL SHOULDER IMPLANT Left 6/1/2021    Procedure: ARTHROPLASTY SHOULDER REVERSE;  Surgeon: Tiffanie Baker MD;  [facility];  Service: Orthopedics    VA WRIST Eppie Ku LIG Right 5/24/2022    Procedure: RELEASE CARPAL TUNNEL ENDOSCOPIC-right;  Surgeon: Leonid Dugan MD;  [facility];  Service: Orthopedics    REVISION TOTAL HIP ARTHROPLASTY Right     TOE AMPUTATION Left 7/24/2022    Procedure: 5TH METATARSAL RESECTION WITH BOTTOM FLAP CLOSURE;  Surgeon: Joanne Hernandez DPM;  [facility];  Service: Podiatry    TONSILLECTOMY          07/25/22 0935   PT Last Visit   PT Visit Date 07/25/22   Pain Assessment   Pain Assessment Tool 0-10   Pain Score No Pain   Restrictions/Precautions   LLE Weight Bearing Per Order NWB   Braces or Orthoses   (denies)   Other Precautions Multiple lines;Telemetry;WBS;Fall Risk   Home Living   Type of [address] [place] [address] Two level; Able to live on main level with bedroom/bathroom  (1 st floor set-up w/ 2 VILMA)   Home Equipment Walker   Prior Function   Level of [place] Independent with ADLs and functional mobility  (amb w/ rw)   Lives With Family  (sister)   Receives Help From Family   General   Additional Pertinent History cleared for assessment (spoke to nsg)   Cognition   Overall Cognitive Status WFL   Arousal/Participation Alert   Orientation Level Oriented to person;Oriented to place;Oriented to situation   Memory Decreased recall of precautions;Decreased recall of recent events   Following Commands Follows one step commands without difficulty   Subjective   Subjective Alert; in bed; responds to questions appropriately; agreeable to mobilize   RUE Assessment   RUE Assessment WFL  (AROM)   LUE Assessment   LUE Assessment X  (decreased shld AROM)   RLE Assessment   RLE Assessment WFL  (AROM)   Strength RLE   RLE Overall Strength   (fair (grossly))   LLE Assessment   LLE Assessment   (decreased AROM hip and knee; ankle not tested (immobilized))   Strength LLE   LLE Overall Strength   (fair - hip and knee)   Bed Mobility   Supine to Sit 2  Maximal assistance   Additional items Assist x 1; Increased time required;Verbal cues;LE management   Sit to Supine 2  Maximal assistance   Additional items Assist x 1; Increased time required;Verbal cues;LE management  (repositioned higher in bed w/ (A)x2)   Transfers   Sit to Stand 2  Maximal assistance   Additional items Assist x 1; Increased time required;Verbal cues  (partial stand achieved on (R) LE)   Stand to Sit 2  Maximal assistance   Additional items Assist x 1;Verbal cues   Stand pivot Unable to assess  (not appropriate at this time)   Balance   Static Sitting Poor +   Dynamic Sitting Poor   Static Standing Zero   Activity Tolerance   Activity Tolerance Patient limited by fatigue   Nurse Made Aware spoke to RN   Assessment   Prognosis Fair   Problem List Decreased strength;Decreased endurance; Impaired balance;Decreased mobility; Decreased skin integrity;Orthopedic restrictions   Assessment Pt is 65 y o  female admitted with hx of left foot  wound and Dx of Osteomyelitis; during the course, pt underwent 5TH METATARSAL RESECTION WITH BOTTOM FLAP CLOSURE (Left) on 7/24/2022   Pt 's comorbidities affecting POC include: Acute blood loss anemia, Anxiety, Arthritis, Depression, Femur neck fracture ([address] ), Osteoporosis, Polio, PVD (peripheral vascular disease) ([address] ), Right BBB/left ant fasc and "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 level of support available at home  Pt's clinical presentation is currently  unstable/unpredictable which is evident in ongong telem monitoring, WB restrictions and inability to progress further w/ OOB mobilization w/ max required for bed bed mob and partial transfer trial at bedside  Pt presents w/ generalized weakness, decreased (L) UE AROM, decreased LE strength, decreased functional endurance and activity tolerance, impaired balance, bed mob and transfer deficits, orthopedic restrictions and inability to amb at this time  Will cont to follow pt in PT for graded mobilization as clinical course allwos to address above functional deficits and to max level of (I), endurance, and safety  Currently, based on overall mobility status, recommend  rehab upon D/C  Will cont to follow until then  Barriers to Discharge Inaccessible home environment;Decreased caregiver support   Goals   Patient Goals to get better   STG Expiration Date 08/04/22   Short Term Goal #1 7-10 days  Pt will achieve min (A)x1 level w/ bed mob in order to facilitate safety with OOB and back to bed transitions in prior living environment to decrease burden of care  Pt will perform transfers w/ mod (A)x1 to assure safety w/ functional mobility/transitions w/ all aspects of mobility/locomotion  Pt will sustain at least poor + standing supported balance x 1 min to facilitate progression w/ SPT transitions  Pt will participate in LE therex to max progression w/ mobility skills  PT Treatment Day 0   Plan   Treatment/Interventions Functional transfer training;LE strengthening/ROM; Therapeutic exercise; Endurance training;Equipment eval/education; Bed mobility;Spoke to nursing;OT   PT Frequency 3-5x/wk   Recommendation   PT Discharge Recommendation Post acute rehabilitation services   Equipment Recommended Other (Comment)  (r/o rw during follow up visits)   [place] [address] in Bed Without Bedrails 2   Lying on Back to Sitting on Edge of Flat Bed 2   Moving Bed to Chair 1   Standing Up From Chair 1   Walk in Room 1   Climb 3-5 Stairs 1   Basic Mobility Inpatient Raw Score 8   Turning Head Towards Sound 4   Follow Simple Instructions 3   Low Function Basic Mobility Raw Score 15   Low Function Basic Mobility Standardized Score 23 9   Highest Level Of Mobility   -[facility] Goal 3: Sit at edge of bed   -HLM Achieved 3: Sit at edge of bed   Modified Isanti Scale   Modified Isanti Scale 4   End of Consult   Patient Position at End of Consult Supine; All needs within reach           [facility], PT

## 2022-07-25 NOTE — PLAN OF CARE
Problem: PHYSICAL THERAPY ADULT  Goal: Performs mobility at highest level of function for planned discharge setting  See evaluation for individualized goals  Description: Treatment/Interventions: Functional transfer training, LE strengthening/ROM, Therapeutic exercise, Endurance training, Equipment eval/education, Bed mobility, Spoke to nursing, OT  Equipment Recommended: Other (Comment) (r/o rw during follow up visits)       See flowsheet documentation for full assessment, interventions and recommendations  Note: Prognosis: Fair  Problem List: Decreased strength, Decreased endurance, Impaired balance, Decreased mobility, Decreased skin integrity, Orthopedic restrictions  Assessment: Pt is 65 y o  female admitted with hx of left foot  wound and Dx of Osteomyelitis; during the course, pt underwent 5TH METATARSAL RESECTION WITH BOTTOM FLAP CLOSURE (Left) on 7/24/2022  Pt 's comorbidities affecting POC include: Acute blood loss anemia, Anxiety, Arthritis, Depression, Femur neck fracture ([facility] [address] ), Osteoporosis, Polio, PVD (peripheral vascular disease) ([facility] [address] ), Right BBB/left ant fasc and "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 level of support available at home  Pt's clinical presentation is currently  unstable/unpredictable which is evident in ongong telem monitoring, WB restrictions and inability to progress further w/ OOB mobilization w/ max required for bed bed mob and partial transfer trial at bedside  Pt presents w/ generalized weakness, decreased (L) UE AROM, decreased LE strength, decreased functional endurance and activity tolerance, impaired balance, bed mob and transfer deficits, orthopedic restrictions and inability to amb at this time   Will cont to follow pt in PT for graded mobilization as clinical course allwos to address above functional deficits and to max level of (I), endurance, and safety  Currently, based on overall mobility status, recommend  rehab upon D/C  Will cont to follow until then  Barriers to Discharge: Inaccessible home environment, Decreased caregiver support     PT Discharge Recommendation: Post acute rehabilitation services    See flowsheet documentation for full assessment

## 2022-07-25 NOTE — PLAN OF CARE
Problem: PAIN - ADULT  Goal: Verbalizes/displays adequate comfort level or baseline comfort level  Description: Interventions:  - Encourage patient to monitor pain and request assistance  - Assess pain using appropriate pain scale  - Administer analgesics based on type and severity of pain and evaluate response  - Implement non-pharmacological measures as appropriate and evaluate response  - Consider cultural and social influences on pain and pain management  - Notify physician/advanced practitioner if interventions unsuccessful or patient reports new pain  Outcome: Progressing     Problem: INFECTION - ADULT  Goal: Absence or prevention of progression during hospitalization  Description: INTERVENTIONS:  - Assess and monitor for signs and symptoms of infection  - Monitor lab/diagnostic results  - Monitor all insertion sites, i e  indwelling lines, tubes, and drains  - Monitor endotracheal if appropriate and nasal secretions for changes in amount and color  - [place] appropriate cooling/warming therapies per order  - Administer medications as ordered  - Instruct and encourage patient and family to use good hand hygiene technique  - Identify and instruct in appropriate isolation precautions for identified infection/condition  Outcome: Progressing  Goal: Absence of fever/infection during neutropenic period  Description: INTERVENTIONS:  - Monitor WBC    Outcome: Progressing     Problem: SAFETY ADULT  Goal: Patient will remain free of falls  Description: INTERVENTIONS:  - Educate patient/family on patient safety including physical limitations  - Instruct patient to call for assistance with activity   - Consult OT/PT to assist with strengthening/mobility   - Keep Call bell within reach  - Keep bed low and locked with side rails adjusted as appropriate  - Keep care items and personal belongings within reach  - Initiate and maintain comfort rounds  - Make Fall Risk Sign visible to staff  - Apply yellow socks and bracelet for high fall risk patients  - Consider moving patient to room near nurses station  Outcome: Progressing  Goal: Maintain or return to baseline ADL function  Description: INTERVENTIONS:  -  Assess patient's ability to carry out ADLs; assess patient's baseline for ADL function and identify physical deficits which impact ability to perform ADLs (bathing, care of mouth/teeth, toileting, grooming, dressing, etc )  - Assess/evaluate cause of self-care deficits   - Assess range of motion  - Assess patient's mobility; develop plan if impaired  - Assess patient's need for assistive devices and provide as appropriate  - Encourage maximum independence but intervene and supervise when necessary  - Involve family in performance of ADLs  - Assess for home care needs following discharge   - Consider OT consult to assist with ADL evaluation and planning for discharge  - Provide patient education as appropriate  Outcome: Progressing  Goal: Maintains/Returns to pre admission functional level  Description: INTERVENTIONS:  - Perform BMAT or MOVE assessment daily    - Set and communicate daily mobility goal to care team and patient/family/caregiver     - Collaborate with rehabilitation services on mobility goals if consulted  - Out of bed for toileting  - Record patient progress and toleration of activity level   Outcome: Progressing     Problem: DISCHARGE PLANNING  Goal: Discharge to home or other facility with appropriate resources  Description: INTERVENTIONS:  - Identify barriers to discharge w/patient and caregiver  - Arrange for needed discharge resources and transportation as appropriate  - Identify discharge learning needs (meds, wound care, etc )  - Arrange for interpretive services to assist at discharge as needed  - Refer to Case Management Department for coordinating discharge planning if the patient needs post-hospital services based on physician/advanced practitioner order or complex needs related to functional status, cognitive ability, or social support system  Outcome: Progressing     Problem: Knowledge Deficit  Goal: Patient/family/caregiver demonstrates understanding of disease process, treatment plan, medications, and discharge instructions  Description: Complete learning assessment and assess knowledge base  Interventions:  - Provide teaching at level of understanding  - Provide teaching via preferred learning methods  Outcome: Progressing     Problem: MOBILITY - ADULT  Goal: Maintain or return to baseline ADL function  Description: INTERVENTIONS:  -  Assess patient's ability to carry out ADLs; assess patient's baseline for ADL function and identify physical deficits which impact ability to perform ADLs (bathing, care of mouth/teeth, toileting, grooming, dressing, etc )  - Assess/evaluate cause of self-care deficits   - Assess range of motion  - Assess patient's mobility; develop plan if impaired  - Assess patient's need for assistive devices and provide as appropriate  - Encourage maximum independence but intervene and supervise when necessary  - Involve family in performance of ADLs  - Assess for home care needs following discharge   - Consider OT consult to assist with ADL evaluation and planning for discharge  - Provide patient education as appropriate  Outcome: Progressing  Goal: Maintains/Returns to pre admission functional level  Description: INTERVENTIONS:  - Perform BMAT or MOVE assessment daily    - Set and communicate daily mobility goal to care team and patient/family/caregiver     - Collaborate with rehabilitation services on mobility goals if consulted  - Out of bed for toileting  - Record patient progress and toleration of activity level   Outcome: Progressing     Problem: Prexisting or High Potential for Compromised Skin Integrity  Goal: Skin integrity is maintained or improved  Description: INTERVENTIONS:  - Identify patients at risk for skin breakdown  - Assess and monitor skin integrity  - Assess and monitor nutrition and hydration status  - Monitor labs   - Assess for incontinence   - Turn and reposition patient  - Assist with mobility/ambulation  - Relieve pressure over bony prominences  - Avoid friction and shearing  - Provide appropriate hygiene as needed including keeping skin clean and dry  - Evaluate need for skin moisturizer/barrier cream  - Collaborate with interdisciplinary team   - Patient/family teaching  - Consider wound care consult   Outcome: Progressing     Problem: Nutrition/Hydration-ADULT  Goal: Nutrient/Hydration intake appropriate for improving, restoring or maintaining nutritional needs  Description: Monitor and assess patient's nutrition/hydration status for malnutrition  Collaborate with interdisciplinary team and initiate plan and interventions as ordered  Monitor patient's weight and dietary intake as ordered or per policy  Utilize nutrition screening tool and intervene as necessary  Determine patient's food preferences and provide high-protein, high-caloric foods as appropriate       INTERVENTIONS:  - Monitor oral intake, urinary output, labs, and treatment plans  - Assess nutrition and hydration status and recommend course of action  - Evaluate amount of meals eaten  - Assist patient with eating if necessary   - Allow adequate time for meals  - Recommend/ encourage appropriate diets, oral nutritional supplements, and vitamin/mineral supplements  - Order, calculate, and assess calorie counts as needed  - Recommend, monitor, and adjust tube feedings and TPN/PPN based on assessed needs  - Assess need for intravenous fluids  - Provide specific nutrition/hydration education as appropriate  - Include patient/family/caregiver in decisions related to nutrition  Outcome: Progressing

## 2022-07-25 NOTE — ASSESSMENT & PLAN NOTE
· Mild, suspect dehydration in the setting of NPO for surgery   · Continue with IV fluids for now   · Encourage PO intake   · Monitor BMP

## 2022-07-25 NOTE — ASSESSMENT & PLAN NOTE
Presented to the ED with increased L foot pain  Patient with left plantar foot ulceration, admitted to Podiatry service    · XR concerning for bone involvement   · Currently on IV Ancef   · Wound culture grew staph aureus   · Local wound care per primary   · S/p OR resection of 5th Metatarsal head today 7/24  · Follow-up OR cultures   · Recommend d/c antibiotics if surgical cure achieved, d/w podiatry   · Pain regimen: continue with scheduled Tylenol, increased prn oxy to 5-7 5 mg prn, change breakthrough from IV dilaudid to IV morphine   · PT/OT postoperatively

## 2022-07-25 NOTE — OCCUPATIONAL THERAPY NOTE
Occupational Therapy Evaluation     Patient Name: Rachel Fleming  Today's Date: 7/25/2022  Problem List  Principal Problem:    Osteomyelitis [facility])  Active Problems:    Anxiety    Hypothyroidism due to Hashimoto's thyroiditis    Rheumatoid arthritis involving multiple sites with positive rheumatoid factor ([facility] [address] )    Hyponatremia    Past Medical History  Past Medical History:   Diagnosis Date    Acute blood loss anemia 9/9/2020    Aftercare following joint replacement 9/9/2020    Patient had right reversed total shoulder arthroplasty   Pain was controlled when he left the hospital       Anxiety     Arthritis     Depression     Disease of thyroid gland     HYPO    Femur neck fracture (HCC)     GERD (gastroesophageal reflux disease)     Hyperthyroidism     RESOLVED: [id number]    Osteoporosis     Polio     PVD (peripheral vascular disease) ([facility] [address] )     Right BBB/left ant fasc block     UTI (urinary tract infection)     Varicella infection     LAST ASSESSED: [id number]     Past Surgical History  Past Surgical History:   Procedure Laterality Date    APPENDECTOMY      HIP ARTHROPLASTY Left 11/27/2017    Procedure: REMOVAL IM NAIL CONVERSION TO TOTAL HIP ARTHROPLASTY POSTERIOR APPROACH;  Surgeon: Timo Baugh MD;  [facility];  Service: Orthopedics    HIP FRACTURE SURGERY      HIP SURGERY      both hips replaced;2013 left ,2014 right    JOINT REPLACEMENT Right     HIP TOTAL    SD CONV PREV HIP SURG TO TOT HIP Johnita Rash Left 10/4/2017    Procedure: Gillvinayakina Calkin removal of left hip;  Surgeon: Timo Baugh MD;  [facility];  Service: Orthopedics    SD RECONSTR TOTAL SHOULDER IMPLANT Right 9/2/2020    Procedure: ARTHROPLASTY SHOULDER REVERSE;  Surgeon: Mariel Serna MD;  [facility];  Service: Orthopedics    SD [address] IMPLANT Left 6/1/2021    Procedure: ARTHROPLASTY SHOULDER REVERSE;  Surgeon: Mariel Serna MD;  [facility];  Service: Orthopedics   Sheela Rothman OR WRIST Carine Carls LIG Right 5/24/2022    Procedure: RELEASE CARPAL TUNNEL ENDOSCOPIC-right;  Surgeon: Monique Patricia MD;  [facility];  Service: Orthopedics    REVISION TOTAL HIP ARTHROPLASTY Right     TOE AMPUTATION Left 7/24/2022    Procedure: 5TH METATARSAL RESECTION WITH BOTTOM FLAP CLOSURE;  Surgeon: Alejandro Doss DPM;  [facility];  Service: Podiatry    TONSILLECTOMY           07/25/22 1031   OT Last Visit   OT Visit Date 07/25/22   Note Type   Note type Evaluation   Restrictions/Precautions   Weight Bearing Precautions Per Order Yes   LLE Weight Bearing Per Order (S)  NWB   Other Precautions (S)  Fall Risk;Telemetry;Cognitive; Bed Alarm;WBS  (+RISHI drain to left  LE)   Pain Assessment   Pain Assessment Tool 0-10   Pain Score 7   Pain Location/Orientation Orientation: Left; Location[facility]   Hospital Pain Intervention(s) MD notified (Comment); Repositioned;Rest;Environmental changes; Emotional support; Ambulation/increased activity   Home Living   Type of [address] [place] [address] Two level; Able to live on main level with bedroom/bathroom; Performs ADLs on one level  (pt reports sleeps on couch at baseline )   Bathroom Shower/Tub Walk-in shower   Bathroom Toilet Raised   Bathroom Equipment Grab bars in shower; Shower chair   Bathroom Accessibility Accessible   Home Equipment Grab bars; Walker   Prior Function   Lives With Family  (sister)   [address] Help From Family  (sister, mirna, granddaughter, HHA 3 x week for 5 hours for pt/sister who assist with cleaning, laundry, meals as needed )   ADL Assistance Needs assistance   IADLs Needs assistance   Vocational Retired   Lifestyle   Autonomy Family assists pt with dressing/bathing, occassional assistance with self feeding due to shoulder flexion deficits, family and HHA assist with all IaDL's +RW with functional mobility, (-) per pt report hasnt driven in a year     Reciprocal Relationships sister, neice, grandaughter   Service to Others retired seamstress   Intrinsic Gratification TV, socializing with family   ADL   Eating Assistance 1  Total Assistance   Eating Deficit (2* to shoulder ROM/strength deficits)   Grooming Assistance 1  Total Assistance   UB Bathing Assistance 2  Maximal Assistance   LB Bathing Assistance 2  Maximal Assistance   [address] 2  Maximal [address] 1  Total [address]  1  Total Assistance   Bed Mobility   Supine to Sit 2  Maximal assistance   Additional items Assist x 1; Increased time required;HOB elevated   Sit to Supine 2  Maximal assistance   Additional items Assist x 1; Increased time required   Additional Comments pt peformed EOB sitting with posterior lean initally (mod a) progressing to CS   Transfers   Sit to Stand 2  Maximal assistance   Additional items Assist x 1   Stand to Sit 2  Maximal assistance   Additional items Assist x 1   Additional Comments +anterior approach with pt able to clear bottom approx~10% from bed, pt appeared fearful, good recall/ability to maintain NWB to left LE  Functional Mobility   Additional Comments unable/unsafe to assess   Balance   Static Sitting Poor   Dynamic Sitting Poor -   Static Standing Poor -   Ambulatory Zero   Activity Tolerance   Activity Tolerance Patient limited by fatigue; Other (Comment)  (fear of mobility)   Medical Staff Made Aware PT for discharge planning   Nurse Made Aware RN cleared pt for therapy   RUE Assessment   RUE Assessment X  (shoulder strength 2-/5 to all planes, elbow 3-/5,  4-/5 +right hand dominant   pt with 20* of AROM against gravity with shoulder flexion>left UE 2* to total shoulder replacement per pt report   LUE Assessment   LUE Assessment X  (shoulder strength to all planes 2-/5, elbow 3-/5,  4-/5 shoulder limitations 2* to TSA per pt report )   Hand Function   Gross Motor Coordination Functional   Fine Motor Coordination Impaired   Sensation   Light Touch Partial deficits in the RUE   Cognition   Overall Cognitive Status [facility]   Arousal/Participation Alert; Responsive; Cooperative   Attention Within functional limits   Orientation Level Oriented X4   Memory Within functional limits   Following Commands Follows multistep commands with increased time or repetition   Comments pt oriented with good recall of social history/current medical events including NWB status to LLE, limited by fear of falling with mobility tasks  Assessment   Limitation Decreased ADL status; Decreased UE ROM; Decreased UE strength;Decreased Safe judgement during ADL;Decreased endurance;Decreased fine motor control;Decreased self-care trans;Decreased high-level ADLs; Decreased sensation   Prognosis Fair   Assessment Pt is a 65 y o  female who was [facility] to [facility] on 7/21/2022 with Osteomyelitis [facility]) s/p left partial 5th ray resection with flap closure on 7/24 and now NWB to left LE, RA, restless leg syndrome,    Pt's problem list also includes PMH of  has a past medical history of Acute blood loss anemia (9/9/2020), Aftercare following joint replacement (9/9/2020), Anxiety, Arthritis, Depression, Disease of thyroid gland, Femur neck fracture ([address] ), GERD (gastroesophageal reflux disease), Hyperthyroidism, Osteoporosis, Polio, PVD (peripheral vascular disease) ([facility] [address] ), Right BBB/left ant fasc block, UTI (urinary tract infection), and Varicella infection    At baseline pt was completing assistance with ADL's/IaDl's, +RW with functional ambulation  Pt lives with sister in a [facility] with 2STE and first floor set-up Currently pt requires total assist for overall ADLS and max a x 1 with bed mobility and sit to stand transfers with out AD for functional transfers   Pt currently presents with impairments in the following categories -steps to enter environment, difficulty performing ADLS, difficulty performing IADLS , limited insight into deficits and decreased initiation and engagement  activity tolerance, endurance, standing balance/tolerance, sitting balance/tolerance, UE strength, UE ROM, FMC and GMC  These impairments, as well as pt's fatigue, WBS , decreased caregiver support and risk for falls  limit pt's ability to safely engage in all baseline areas of occupation, includingeating, grooming, bathing, dressing, toileting, functional mobility/transfers, community mobility, social participation  and leisure activities  The patient's raw score on the AM-PAC Daily Activity inpatient short form low function score is 15, standardized score is Low Function Daily Activity Standardized Score: 26 28  Patients with a standardized score less than 39 4 are likely to benefit from discharge to post-acute rehab services  Please refer to the recommendation of the Occupational Therapist for safe discharge planning  From OT standpoint, recommend STR upon D/C  OT will continue to follow to address the below stated goals  Goals   Patient Goals try again tomorrow (for full sit to stand transfers)   LTG Time Frame 10-14   Long Term Goal #1 see goals below   Recommendation   OT Discharge Recommendation Post acute rehabilitation services   AM-[facility] Daily Activity Inpatient   Lower Body Dressing 1   Bathing 2   Toileting 1   Upper Body Dressing 1   Grooming 1   Eating 1   Daily Activity Raw Score 7   Turning Head Towards Sound 4   Follow Simple Instructions 4   Low Function Daily Activity Raw Score 15   Low Function Daily Activity Standardized Score 26 28   AM-PAC Applied Cognition Inpatient   Following a Speech/Presentation 3   Understanding Ordinary Conversation 4   Taking Medications 4   Remembering Where Things Are Placed or Put Away 4   Remembering List of 4-5 Errands 4   Taking Care of Complicated Tasks 4   Applied Cognition Raw Score 23   Applied Cognition Standardized Score 53 08      Occupational Therapy Goals:    *S with bed mobility to engage in functional tasks    *Min a Adl's after setup with use of AE PRN  *Min a toileting and clothing management   *S functional mobility and transfers to/from all surfaces with Fair dynamic balance and safety for participation in dynamic adls and iadl tasks   *Demonstrate good carryover with safe use of RW during functional tasks   *Assess DME needs   *Increase activity tolerance to 25-30 minutes for participation in adls and enjoyable activities  *Demonstrate good carryover of pt/family education and training with good tolerance for increased safety and independence with ADL's/ADl's  *Pt will improve standing balance to 2-3 minutes with functional tasks to increase I with toileting/transfers      Kevin Gordon MOT, OTR/L

## 2022-07-26 LAB — BACTERIA SPEC ANAEROBE CULT: NORMAL

## 2022-07-26 PROCEDURE — 99232 SBSQ HOSP IP/OBS MODERATE 35: CPT | Performed by: PHYSICIAN ASSISTANT

## 2022-07-26 PROCEDURE — 99024 POSTOP FOLLOW-UP VISIT: CPT | Performed by: PODIATRIST

## 2022-07-26 RX ADMIN — MULTIPLE VITAMINS W/ MINERALS TAB 1 TABLET: TAB ORAL at 08:37

## 2022-07-26 RX ADMIN — SENNOSIDES AND DOCUSATE SODIUM 1 TABLET: 8.6; 5 TABLET ORAL at 08:38

## 2022-07-26 RX ADMIN — HYDROXYCHLOROQUINE SULFATE 200 MG: 200 TABLET ORAL at 08:40

## 2022-07-26 RX ADMIN — HEPARIN SODIUM 5000 UNITS: 5000 INJECTION INTRAVENOUS; SUBCUTANEOUS at 14:15

## 2022-07-26 RX ADMIN — NORTRIPTYLINE HYDROCHLORIDE 100 MG: 50 CAPSULE ORAL at 22:19

## 2022-07-26 RX ADMIN — FERROUS GLUCONATE 324 MG: 324 TABLET ORAL at 08:38

## 2022-07-26 RX ADMIN — OXYCODONE HYDROCHLORIDE 5 MG: 5 TABLET ORAL at 18:42

## 2022-07-26 RX ADMIN — MORPHINE SULFATE 2 MG: 2 INJECTION, SOLUTION INTRAMUSCULAR; INTRAVENOUS at 08:39

## 2022-07-26 RX ADMIN — OXYCODONE HYDROCHLORIDE 5 MG: 5 TABLET ORAL at 12:15

## 2022-07-26 RX ADMIN — PREDNISONE 5 MG: 5 TABLET ORAL at 08:38

## 2022-07-26 RX ADMIN — FOLIC ACID 2000 MCG: 1 TABLET ORAL at 08:38

## 2022-07-26 RX ADMIN — HEPARIN SODIUM 5000 UNITS: 5000 INJECTION INTRAVENOUS; SUBCUTANEOUS at 06:47

## 2022-07-26 RX ADMIN — ACETAMINOPHEN 975 MG: 325 TABLET, FILM COATED ORAL at 06:47

## 2022-07-26 RX ADMIN — METHOCARBAMOL TABLETS 750 MG: 750 TABLET, COATED ORAL at 08:38

## 2022-07-26 RX ADMIN — Medication 375 MG: at 08:38

## 2022-07-26 RX ADMIN — HEPARIN SODIUM 5000 UNITS: 5000 INJECTION INTRAVENOUS; SUBCUTANEOUS at 22:17

## 2022-07-26 RX ADMIN — ROPINIROLE HYDROCHLORIDE 2 MG: 1 TABLET, FILM COATED ORAL at 22:18

## 2022-07-26 RX ADMIN — MELOXICAM 7.5 MG: 7.5 TABLET ORAL at 08:38

## 2022-07-26 RX ADMIN — MELATONIN 6 MG: at 22:18

## 2022-07-26 RX ADMIN — ACETAMINOPHEN 975 MG: 325 TABLET, FILM COATED ORAL at 22:18

## 2022-07-26 RX ADMIN — ACETAMINOPHEN 975 MG: 325 TABLET, FILM COATED ORAL at 14:14

## 2022-07-26 RX ADMIN — OXYCODONE HYDROCHLORIDE 5 MG: 5 TABLET ORAL at 06:47

## 2022-07-26 RX ADMIN — LEVOTHYROXINE SODIUM 25 MCG: 25 TABLET ORAL at 08:37

## 2022-07-26 RX ADMIN — OXYCODONE HYDROCHLORIDE 7.5 MG: 5 TABLET ORAL at 22:18

## 2022-07-26 NOTE — PROGRESS NOTES
[address] [facility]  Progress Note Gretchen Kennedy Colon 1945, 65 y o  female MRN: [id number]  Unit/Bed#: MS [facility]-01 Encounter: [id number]  Primary Care Provider: Dorisann Goltz, MD   Date and time admitted to hospital: 7/21/2022  5:59 PM    * Osteomyelitis [facility])  Assessment & Plan  · Presented to the ED with increased L foot pain  Patient with left plantar foot ulceration, admitted to Podiatry service  · XR concerning for bone involvement   · Local wound care per primary   · S/p OR resection of 5th Metatarsal head today 7/24  · Antibiotics were discontinued post-op  · Pain control   · PT/OT postoperatively     Hyponatremia  Assessment & Plan  · Mild, was suspected due to dehydration in the setting of NPO for surgery   · Continue with IV fluids for now   · Encourage PO intake   · Check BMP    Rheumatoid arthritis involving multiple sites with positive rheumatoid factor (HCC)  Assessment & Plan  · Continue prednisone 5 mg daily and methotrexate per PTA medication regimen     Hypothyroidism due to Hashimoto's thyroiditis  Assessment & Plan  · Continue synthroid therapy     Anxiety  Assessment & Plan  · Mood appears stable  · Would avoid benzos if possible         VTE Pharmacologic Prophylaxis: VTE Score: 6 High Risk (Score >/= 5) - Pharmacological DVT Prophylaxis Ordered: heparin  Sequential Compression Devices Ordered  Patient Centered Rounds: I performed bedside rounds with nursing staff today  Discussions with Specialists or Other Care Team Provider:     Education and Discussions with Family / Patient: patient  Time Spent for Care: 30 minutes  More than 50% of total time spent on counseling and coordination of care as described above  Current Length of Stay: 5 day(s)  Current Patient Status: Inpatient   Certification Statement: per primary, Podiatry  Discharge Plan: per primary, Podiatry   NAI will continue to follow    Code Status: Level 1 - Full Code    Subjective:   Ms Demetris Bañuelos reports foot pain  Otherwise she denies complaint     Objective:     Vitals:   Temp (24hrs), Av 3 °F (36 8 °C), Min:97 7 °F (36 5 °C), Max:98 6 °F (37 °C)    Temp:  [97 7 °F (36 5 °C)-98 6 °F (37 °C)] 97 7 °F (36 5 °C)  HR:  [76-95] 76  Resp:  [16] 16  BP: (116-153)/(58-82) 116/58  SpO2:  [93 %-98 %] 98 %  Body mass index is 20 9 kg/m²  Input and Output Summary (last 24 hours): Intake/Output Summary (Last 24 hours) at 2022 1304  Last data filed at 2022 0900  Gross per 24 hour   Intake 225 ml   Output 1200 ml   Net -975 ml       Physical Exam:   Physical Exam  Vitals and nursing note reviewed  Constitutional:       Comments: Patient seen sitting in bed, NAD   Cardiovascular:      Rate and Rhythm: Normal rate and regular rhythm  Pulmonary:      Effort: Pulmonary effort is normal  No respiratory distress  Breath sounds: Normal breath sounds  Abdominal:      General: Bowel sounds are normal       Palpations: Abdomen is soft  Tenderness: There is no abdominal tenderness  Musculoskeletal:      Right lower leg: No edema  Left lower leg: No edema  Skin:     General: Skin is warm  Neurological:      Mental Status: She is alert and oriented to person, place, and time     Psychiatric:         Mood and Affect: Mood normal          Behavior: Behavior normal           Additional Data:     Labs:  Results from last 7 days   Lab Units 22  0608   WBC Thousand/uL 4 38   HEMOGLOBIN g/dL 9 4*   HEMATOCRIT % 30 7*   PLATELETS Thousands/uL 369   NEUTROS PCT % 66   LYMPHS PCT % 20   MONOS PCT % 10   EOS PCT % 2     Results from last 7 days   Lab Units 22  0438 22  0608 22  2020   SODIUM mmol/L 134*   < > 140   POTASSIUM mmol/L 4 0   < > 3 3*   CHLORIDE mmol/L 103   < > 108   CO2 mmol/L 24   < > 25   BUN mg/dL 8   < > 8   CREATININE mg/dL 0 25*   < > 0 45*   ANION GAP mmol/L 7   < > 7   CALCIUM mg/dL 7 7*   < > 8 8   ALBUMIN g/dL  --   -- 2 8*   TOTAL BILIRUBIN mg/dL  --   --  0 37   ALK PHOS U/L  --   --  85   ALT U/L  --   --  25   AST U/L  --   --  34   GLUCOSE RANDOM mg/dL 70   < > 83    < > = values in this interval not displayed  Lines/Drains:  Invasive Devices  Report    Peripheral Intravenous Line  Duration           Peripheral IV 22 Left;Proximal;Ventral (anterior) Forearm 2 days          Drain  Duration           Closed/Suction Drain Left;Posterior; Lateral Foot Bulb 10 Fr  2 days                      Imaging: Reviewed radiology reports from this admission including: left foot XR    Recent Cultures (last 7 days):   Results from last 7 days   Lab Units 22  0839 22   GRAM STAIN RESULT  No Polys  No organisms seen Rare Gram positive cocci in pairs*  No polys seen*   WOUND CULTURE   --  2+ Growth of Staphylococcus aureus*  1+ Growth of Pseudomonas aeruginosa*  1+ Growth of - Acinetobacter pittii  / Acinetobacter species*       Last 24 Hours Medication List:   Current Facility-Administered Medications   Medication Dose Route Frequency Provider Last Rate    acetaminophen  975 mg Oral Q8H [facility] Alesia Gordillo PA-C      Ascorbic Acid (Vitamin C)  375 mg Oral Daily Jeremi Arita, DPM      fentaNYL  25 mcg Intravenous Q3 min PRN Leonor Tavera CRNA      ferrous gluconate  324 mg Oral Daily Jeremi Arita, DPM      folic acid  8,977 mcg Oral Daily Jeremi Arita, DPM      heparin (porcine)  5,000 Units Subcutaneous [facility] Jeremi Lyla, DPM      hydroxychloroquine  200 mg Oral Daily With Breakfast Jeremi Arita, DPJACEY      hydrOXYzine HCL  25 mg Oral Q6H PRN Jeremi Arita, DPM      lactated ringers  75 mL/hr Intravenous Continuous Leonor Tavera CRNA Stopped (22 174)    levothyroxine  25 mcg Oral Daily Jeremi Lyla, DPM      melatonin  6 mg Oral HS Jeremi Arita, DPM      meloxicam  7 5 mg Oral Daily Jeremi Arita, DPM      methocarbamol  750 mg Oral Q6H PRN Jeremi Arita DPM      methotrexate  2 5 mg Oral Weekly Ania Curling, DPM      morphine injection  2 mg Intravenous Q4H PRN Yong Bucco Held, ANGELA      multivitamin-minerals  1 tablet Oral Daily Ania Curling, [place]      naloxone  0 04 mg Intravenous Q1MIN PRN Ania Curling, DPM      nortriptyline  100 mg Oral HS Ania Curling, DPM      ondansetron  4 mg Intravenous Q4H PRN Ania Curling, DPM      oxyCODONE  5 mg Oral Q4H PRN Alesia E Held, ANGELA      oxyCODONE  7 5 mg Oral Q4H PRN Alesia E Held, ANGELA      polyethylene glycol  17 g Oral Daily PRN Ania Curling, DPM      predniSONE  5 mg Oral Daily Ania Curling, DPM      rOPINIRole  2 mg Oral HS Ania Curling, DPM      senna-docusate sodium  1 tablet Oral BID Ania Curling, DPM          Today, Patient Was Seen By: Ghulam Chung PA-C    **Please Note: This note may have been constructed using a voice recognition system  **

## 2022-07-26 NOTE — PLAN OF CARE
Problem: PAIN - ADULT  Goal: Verbalizes/displays adequate comfort level or baseline comfort level  Description: Interventions:  - Encourage patient to monitor pain and request assistance  - Assess pain using appropriate pain scale  - Administer analgesics based on type and severity of pain and evaluate response  - Implement non-pharmacological measures as appropriate and evaluate response  - Consider cultural and social influences on pain and pain management  - Notify physician/advanced practitioner if interventions unsuccessful or patient reports new pain  Outcome: Progressing     Problem: INFECTION - ADULT  Goal: Absence or prevention of progression during hospitalization  Description: INTERVENTIONS:  - Assess and monitor for signs and symptoms of infection  - Monitor lab/diagnostic results  - Monitor all insertion sites, i e  indwelling lines, tubes, and drains  - Monitor endotracheal if appropriate and nasal secretions for changes in amount and color  - [place] appropriate cooling/warming therapies per order  - Administer medications as ordered  - Instruct and encourage patient and family to use good hand hygiene technique  - Identify and instruct in appropriate isolation precautions for identified infection/condition  Outcome: Progressing  Goal: Absence of fever/infection during neutropenic period  Description: INTERVENTIONS:  - Monitor WBC    Outcome: Progressing     Problem: SAFETY ADULT  Goal: Patient will remain free of falls  Description: INTERVENTIONS:  - Educate patient/family on patient safety including physical limitations  - Instruct patient to call for assistance with activity   - Consult OT/PT to assist with strengthening/mobility   - Keep Call bell within reach  - Keep bed low and locked with side rails adjusted as appropriate  - Keep care items and personal belongings within reach  - Initiate and maintain comfort rounds  - Make Fall Risk Sign visible to staff  - Offer Toileting every  Hours, in advance of need  - Initiate/Maintain alarm  - Obtain necessary fall risk management equipment:   - Apply yellow socks and bracelet for high fall risk patients  - Consider moving patient to room near nurses station  Outcome: Progressing  Goal: Maintain or return to baseline ADL function  Description: INTERVENTIONS:  -  Assess patient's ability to carry out ADLs; assess patient's baseline for ADL function and identify physical deficits which impact ability to perform ADLs (bathing, care of mouth/teeth, toileting, grooming, dressing, etc )  - Assess/evaluate cause of self-care deficits   - Assess range of motion  - Assess patient's mobility; develop plan if impaired  - Assess patient's need for assistive devices and provide as appropriate  - Encourage maximum independence but intervene and supervise when necessary  - Involve family in performance of ADLs  - Assess for home care needs following discharge   - Consider OT consult to assist with ADL evaluation and planning for discharge  - Provide patient education as appropriate  Outcome: Progressing  Goal: Maintains/Returns to pre admission functional level  Description: INTERVENTIONS:  - Perform BMAT or MOVE assessment daily    - Set and communicate daily mobility goal to care team and patient/family/caregiver  - Collaborate with rehabilitation services on mobility goals if consulted  - Perform Range of Motion  times a day  - Reposition patient every  hours    - Dangle patient  times a day  - Stand patient  times a day  - Ambulate patient  times a day  - Out of bed to chair  times a day   - Out of bed for meals  times a day  - Out of bed for toileting  - Record patient progress and toleration of activity level   Outcome: Progressing     Problem: DISCHARGE PLANNING  Goal: Discharge to home or other facility with appropriate resources  Description: INTERVENTIONS:  - Identify barriers to discharge w/patient and caregiver  - Arrange for needed discharge resources and transportation as appropriate  - Identify discharge learning needs (meds, wound care, etc )  - Arrange for interpretive services to assist at discharge as needed  - Refer to Case Management Department for coordinating discharge planning if the patient needs post-hospital services based on physician/advanced practitioner order or complex needs related to functional status, cognitive ability, or social support system  Outcome: Progressing     Problem: Knowledge Deficit  Goal: Patient/family/caregiver demonstrates understanding of disease process, treatment plan, medications, and discharge instructions  Description: Complete learning assessment and assess knowledge base  Interventions:  - Provide teaching at level of understanding  - Provide teaching via preferred learning methods  Outcome: Progressing     Problem: MOBILITY - ADULT  Goal: Maintain or return to baseline ADL function  Description: INTERVENTIONS:  -  Assess patient's ability to carry out ADLs; assess patient's baseline for ADL function and identify physical deficits which impact ability to perform ADLs (bathing, care of mouth/teeth, toileting, grooming, dressing, etc )  - Assess/evaluate cause of self-care deficits   - Assess range of motion  - Assess patient's mobility; develop plan if impaired  - Assess patient's need for assistive devices and provide as appropriate  - Encourage maximum independence but intervene and supervise when necessary  - Involve family in performance of ADLs  - Assess for home care needs following discharge   - Consider OT consult to assist with ADL evaluation and planning for discharge  - Provide patient education as appropriate  Outcome: Progressing  Goal: Maintains/Returns to pre admission functional level  Description: INTERVENTIONS:  - Perform BMAT or MOVE assessment daily    - Set and communicate daily mobility goal to care team and patient/family/caregiver     - Collaborate with rehabilitation services on mobility goals if consulted  - Perform Range of Motion  times a day  - Reposition patient every  hours  - Dangle patient  times a day  - Stand patient  times a day  - Ambulate patient  times a day  - Out of bed to chair  times a day   - Out of bed for meals  times a day  - Out of bed for toileting  - Record patient progress and toleration of activity level   Outcome: Progressing     Problem: Prexisting or High Potential for Compromised Skin Integrity  Goal: Skin integrity is maintained or improved  Description: INTERVENTIONS:  - Identify patients at risk for skin breakdown  - Assess and monitor skin integrity  - Assess and monitor nutrition and hydration status  - Monitor labs   - Assess for incontinence   - Turn and reposition patient  - Assist with mobility/ambulation  - Relieve pressure over bony prominences  - Avoid friction and shearing  - Provide appropriate hygiene as needed including keeping skin clean and dry  - Evaluate need for skin moisturizer/barrier cream  - Collaborate with interdisciplinary team   - Patient/family teaching  - Consider wound care consult   Outcome: Progressing     Problem: Nutrition/Hydration-ADULT  Goal: Nutrient/Hydration intake appropriate for improving, restoring or maintaining nutritional needs  Description: Monitor and assess patient's nutrition/hydration status for malnutrition  Collaborate with interdisciplinary team and initiate plan and interventions as ordered  Monitor patient's weight and dietary intake as ordered or per policy  Utilize nutrition screening tool and intervene as necessary  Determine patient's food preferences and provide high-protein, high-caloric foods as appropriate       INTERVENTIONS:  - Monitor oral intake, urinary output, labs, and treatment plans  - Assess nutrition and hydration status and recommend course of action  - Evaluate amount of meals eaten  - Assist patient with eating if necessary   - Allow adequate time for meals  - Recommend/ encourage appropriate diets, oral nutritional supplements, and vitamin/mineral supplements  - Order, calculate, and assess calorie counts as needed  - Recommend, monitor, and adjust tube feedings and TPN/PPN based on assessed needs  - Assess need for intravenous fluids  - Provide specific nutrition/hydration education as appropriate  - Include patient/family/caregiver in decisions related to nutrition  Outcome: Progressing

## 2022-07-26 NOTE — PROGRESS NOTES
Podiatry - Progress Note  Patient: Brandee Perry Colon 65 y o  female   MRN: [id number]  PCP: Arlene Hillman MD  Unit/Bed#: MS 90322 Encounter: [id number]  Date Of Visit: 22    ASSESSMENT:    Richard Sotomayor is a 65 y o  female with:    1  Left Plantar 5th metatarsal head ulceration with underlying osteomyleitis- Hernandez 3-POA  - S/p left partial 5th ray resection with flap closure (DOS 22)  2  Left leg venous stasis ulcer-POA  3  Chronic venous hypertension  4  Rheumatoid Arthritis   5  Hypothyroidism due to Hashimoto's disease  6  Restless Leg Syndrome    PLAN:    · Surgical dressing changed today, surgical site evaluated  Sutures intact, capillary refill to flap intact, no clinical signs of infection  · Will continue to monitor surgical site, and drainage output  · Will trend labs/vitals  · Elevation and offloading on green foam wedges or pillows when non-ambulatory  · Rest of care per primary team      Weightbearing status: Non-weightbearing left  foot    SUBJECTIVE:     The patient was seen, evaluated, and assessed at bedside today  The patient was awake, alert, and in no acute distress  No acute events overnight  The patient reports mild pain with dressing change today  Patient denies N/V/F/chills/SOB/CP      OBJECTIVE:     Vitals:   /95   Pulse 81   Temp 97 5 °F (36 4 °C)   Resp 16   Ht 4' 10" (1 473 m)   Wt 45 4 kg (100 lb)   SpO2 96%   BMI 20 90 kg/m²     Temp (24hrs), Av 1 °F (36 7 °C), Min:97 5 °F (36 4 °C), Max:98 6 °F (37 °C)      Physical Exam:     Lower Extremity: Left Lower Extremity   Vascular:    DP and PT pulses palpable    CRT to digits and flap < 3 seconds    Mild edema of left foot    Pedal hair absent     Musculoskeletal:     MMT not assessed due to patient anxiety    Negative Homans and Jessa test    ROM at the digits intact    Mild pain with dressing change, lateral forefoot       Neurologic:    Gross sensation intact to digits      Dermatologic: Sutures intact with skin edges well coapted, no dehiscence    No erythema, edema, or purulent drainage from surgical site    Drain in place, with approximately 5cc of serosanguinous fluid    Clinical Images 07/26/22: Additional Data:     Labs:    Results from last 7 days   Lab Units 07/23/22  0608   WBC Thousand/uL 4 38   HEMOGLOBIN g/dL 9 4*   HEMATOCRIT % 30 7*   PLATELETS Thousands/uL 369   NEUTROS PCT % 66   LYMPHS PCT % 20   MONOS PCT % 10   EOS PCT % 2     Results from last 7 days   Lab Units 07/25/22  0438 07/23/22  0608 07/21/22  2020   POTASSIUM mmol/L 4 0   < > 3 3*   CHLORIDE mmol/L 103   < > 108   CO2 mmol/L 24   < > 25   BUN mg/dL 8   < > 8   CREATININE mg/dL 0 25*   < > 0 45*   CALCIUM mg/dL 7 7*   < > 8 8   ALK PHOS U/L  --   --  85   ALT U/L  --   --  25   AST U/L  --   --  34    < > = values in this interval not displayed  * I Have Reviewed All Lab Data Listed Above  Recent Cultures (last 7 days):     Results from last 7 days   Lab Units 07/24/22  0839 07/21/22  1950   GRAM STAIN RESULT  No Polys  No organisms seen Rare Gram positive cocci in pairs*  No polys seen*   WOUND CULTURE   --  2+ Growth of Staphylococcus aureus*  1+ Growth of Pseudomonas aeruginosa*  1+ Growth of - Acinetobacter pittii  / Acinetobacter species*     Results from last 7 days   Lab Units 07/24/22  0839   ANAEROBIC CULTURE  No anaerobes isolated       Imaging: I have personally reviewed pertinent films in PACS  EKG, Pathology, and Other Studies: I have personally reviewed pertinent reports  ** Please Note: Portions of the record may have been created with voice recognition software  Occasional wrong word or "sound a like" substitutions may have occurred due to the inherent limitations of voice recognition software  Read the chart carefully and recognize, using context, where substitutions have occurred   **

## 2022-07-26 NOTE — ASSESSMENT & PLAN NOTE
· Mild, was suspected due to dehydration in the setting of NPO for surgery   · Continue with IV fluids for now   · Encourage PO intake   · Check BMP

## 2022-07-26 NOTE — PLAN OF CARE
Problem: PAIN - ADULT  Goal: Verbalizes/displays adequate comfort level or baseline comfort level  Description: Interventions:  - Encourage patient to monitor pain and request assistance  - Assess pain using appropriate pain scale  - Administer analgesics based on type and severity of pain and evaluate response  - Implement non-pharmacological measures as appropriate and evaluate response  - Consider cultural and social influences on pain and pain management  - Notify physician/advanced practitioner if interventions unsuccessful or patient reports new pain  Outcome: Progressing     Problem: INFECTION - ADULT  Goal: Absence or prevention of progression during hospitalization  Description: INTERVENTIONS:  - Assess and monitor for signs and symptoms of infection  - Monitor lab/diagnostic results  - Monitor all insertion sites, i e  indwelling lines, tubes, and drains  - Monitor endotracheal if appropriate and nasal secretions for changes in amount and color  - [place] appropriate cooling/warming therapies per order  - Administer medications as ordered  - Instruct and encourage patient and family to use good hand hygiene technique  - Identify and instruct in appropriate isolation precautions for identified infection/condition  Outcome: Progressing  Goal: Absence of fever/infection during neutropenic period  Description: INTERVENTIONS:  - Monitor WBC    Outcome: Progressing     Problem: SAFETY ADULT  Goal: Patient will remain free of falls  Description: INTERVENTIONS:  - Educate patient/family on patient safety including physical limitations  - Instruct patient to call for assistance with activity   - Consult OT/PT to assist with strengthening/mobility   - Keep Call bell within reach  - Keep bed low and locked with side rails adjusted as appropriate  - Keep care items and personal belongings within reach  - Initiate and maintain comfort rounds  - Make Fall Risk Sign visible to staff  - Apply yellow socks and bracelet for high fall risk patients  - Consider moving patient to room near nurses station  Outcome: Progressing  Goal: Maintain or return to baseline ADL function  Description: INTERVENTIONS:  -  Assess patient's ability to carry out ADLs; assess patient's baseline for ADL function and identify physical deficits which impact ability to perform ADLs (bathing, care of mouth/teeth, toileting, grooming, dressing, etc )  - Assess/evaluate cause of self-care deficits   - Assess range of motion  - Assess patient's mobility; develop plan if impaired  - Assess patient's need for assistive devices and provide as appropriate  - Encourage maximum independence but intervene and supervise when necessary  - Involve family in performance of ADLs  - Assess for home care needs following discharge   - Consider OT consult to assist with ADL evaluation and planning for discharge  - Provide patient education as appropriate  Outcome: Progressing  Goal: Maintains/Returns to pre admission functional level  Description: INTERVENTIONS:  - Perform BMAT or MOVE assessment daily    - Set and communicate daily mobility goal to care team and patient/family/caregiver     - Collaborate with rehabilitation services on mobility goals if consulted  - Out of bed for toileting  - Record patient progress and toleration of activity level   Outcome: Progressing     Problem: DISCHARGE PLANNING  Goal: Discharge to home or other facility with appropriate resources  Description: INTERVENTIONS:  - Identify barriers to discharge w/patient and caregiver  - Arrange for needed discharge resources and transportation as appropriate  - Identify discharge learning needs (meds, wound care, etc )  - Arrange for interpretive services to assist at discharge as needed  - Refer to Case Management Department for coordinating discharge planning if the patient needs post-hospital services based on physician/advanced practitioner order or complex needs related to functional status, cognitive ability, or social support system  Outcome: Progressing     Problem: Knowledge Deficit  Goal: Patient/family/caregiver demonstrates understanding of disease process, treatment plan, medications, and discharge instructions  Description: Complete learning assessment and assess knowledge base  Interventions:  - Provide teaching at level of understanding  - Provide teaching via preferred learning methods  Outcome: Progressing     Problem: MOBILITY - ADULT  Goal: Maintain or return to baseline ADL function  Description: INTERVENTIONS:  -  Assess patient's ability to carry out ADLs; assess patient's baseline for ADL function and identify physical deficits which impact ability to perform ADLs (bathing, care of mouth/teeth, toileting, grooming, dressing, etc )  - Assess/evaluate cause of self-care deficits   - Assess range of motion  - Assess patient's mobility; develop plan if impaired  - Assess patient's need for assistive devices and provide as appropriate  - Encourage maximum independence but intervene and supervise when necessary  - Involve family in performance of ADLs  - Assess for home care needs following discharge   - Consider OT consult to assist with ADL evaluation and planning for discharge  - Provide patient education as appropriate  Outcome: Progressing  Goal: Maintains/Returns to pre admission functional level  Description: INTERVENTIONS:  - Perform BMAT or MOVE assessment daily    - Set and communicate daily mobility goal to care team and patient/family/caregiver     - Collaborate with rehabilitation services on mobility goals if consulted  - Out of bed for toileting  - Record patient progress and toleration of activity level   Outcome: Progressing     Problem: Prexisting or High Potential for Compromised Skin Integrity  Goal: Skin integrity is maintained or improved  Description: INTERVENTIONS:  - Identify patients at risk for skin breakdown  - Assess and monitor skin integrity  - Assess and monitor nutrition and hydration status  - Monitor labs   - Assess for incontinence   - Turn and reposition patient  - Assist with mobility/ambulation  - Relieve pressure over bony prominences  - Avoid friction and shearing  - Provide appropriate hygiene as needed including keeping skin clean and dry  - Evaluate need for skin moisturizer/barrier cream  - Collaborate with interdisciplinary team   - Patient/family teaching  - Consider wound care consult   Outcome: Progressing     Problem: Nutrition/Hydration-ADULT  Goal: Nutrient/Hydration intake appropriate for improving, restoring or maintaining nutritional needs  Description: Monitor and assess patient's nutrition/hydration status for malnutrition  Collaborate with interdisciplinary team and initiate plan and interventions as ordered  Monitor patient's weight and dietary intake as ordered or per policy  Utilize nutrition screening tool and intervene as necessary  Determine patient's food preferences and provide high-protein, high-caloric foods as appropriate       INTERVENTIONS:  - Monitor oral intake, urinary output, labs, and treatment plans  - Assess nutrition and hydration status and recommend course of action  - Evaluate amount of meals eaten  - Assist patient with eating if necessary   - Allow adequate time for meals  - Recommend/ encourage appropriate diets, oral nutritional supplements, and vitamin/mineral supplements  - Order, calculate, and assess calorie counts as needed  - Recommend, monitor, and adjust tube feedings and TPN/PPN based on assessed needs  - Assess need for intravenous fluids  - Provide specific nutrition/hydration education as appropriate  - Include patient/family/caregiver in decisions related to nutrition  Outcome: Progressing

## 2022-07-26 NOTE — ASSESSMENT & PLAN NOTE
· Presented to the ED with increased L foot pain  Patient with left plantar foot ulceration, admitted to Podiatry service    · XR concerning for bone involvement   · Local wound care per primary   · S/p OR resection of 5th Metatarsal head today 7/24  · Antibiotics were discontinued post-op  · Pain control   · PT/OT postoperatively

## 2022-07-27 LAB
ANION GAP SERPL CALCULATED.3IONS-SCNC: 3 MMOL/L (ref 4–13)
BACTERIA TISS AEROBE CULT: ABNORMAL
BACTERIA TISS AEROBE CULT: ABNORMAL
BUN SERPL-MCNC: 11 MG/DL (ref 5–25)
CALCIUM SERPL-MCNC: 8.4 MG/DL (ref 8.3–10.1)
CHLORIDE SERPL-SCNC: 102 MMOL/L (ref 96–108)
CO2 SERPL-SCNC: 29 MMOL/L (ref 21–32)
CREAT SERPL-MCNC: 0.37 MG/DL (ref 0.6–1.3)
ERYTHROCYTE [DISTWIDTH] IN BLOOD BY AUTOMATED COUNT: 17.9 % (ref 11.6–15.1)
GFR SERPL CREATININE-BSD FRML MDRD: 102 ML/MIN/1.73SQ M
GLUCOSE SERPL-MCNC: 81 MG/DL (ref 65–140)
GRAM STN SPEC: ABNORMAL
GRAM STN SPEC: ABNORMAL
HCT VFR BLD AUTO: 30.7 % (ref 34.8–46.1)
HGB BLD-MCNC: 9.7 G/DL (ref 11.5–15.4)
MCH RBC QN AUTO: 27.5 PG (ref 26.8–34.3)
MCHC RBC AUTO-ENTMCNC: 31.6 G/DL (ref 31.4–37.4)
MCV RBC AUTO: 87 FL (ref 82–98)
PLATELET # BLD AUTO: 395 THOUSANDS/UL (ref 149–390)
PMV BLD AUTO: 8.7 FL (ref 8.9–12.7)
POTASSIUM SERPL-SCNC: 3.6 MMOL/L (ref 3.5–5.3)
RBC # BLD AUTO: 3.53 MILLION/UL (ref 3.81–5.12)
SODIUM SERPL-SCNC: 134 MMOL/L (ref 135–147)
WBC # BLD AUTO: 7.91 THOUSAND/UL (ref 4.31–10.16)

## 2022-07-27 PROCEDURE — 80048 BASIC METABOLIC PNL TOTAL CA: CPT | Performed by: PHYSICIAN ASSISTANT

## 2022-07-27 PROCEDURE — 97110 THERAPEUTIC EXERCISES: CPT

## 2022-07-27 PROCEDURE — 85027 COMPLETE CBC AUTOMATED: CPT | Performed by: PHYSICIAN ASSISTANT

## 2022-07-27 PROCEDURE — 97530 THERAPEUTIC ACTIVITIES: CPT

## 2022-07-27 PROCEDURE — 99232 SBSQ HOSP IP/OBS MODERATE 35: CPT | Performed by: PHYSICIAN ASSISTANT

## 2022-07-27 PROCEDURE — NC001 PR NO CHARGE: Performed by: PODIATRIST

## 2022-07-27 RX ADMIN — PREDNISONE 5 MG: 5 TABLET ORAL at 09:10

## 2022-07-27 RX ADMIN — HEPARIN SODIUM 5000 UNITS: 5000 INJECTION INTRAVENOUS; SUBCUTANEOUS at 21:43

## 2022-07-27 RX ADMIN — METHOCARBAMOL TABLETS 750 MG: 750 TABLET, COATED ORAL at 09:09

## 2022-07-27 RX ADMIN — HYDROXYCHLOROQUINE SULFATE 200 MG: 200 TABLET ORAL at 09:12

## 2022-07-27 RX ADMIN — MORPHINE SULFATE 2 MG: 2 INJECTION, SOLUTION INTRAMUSCULAR; INTRAVENOUS at 09:09

## 2022-07-27 RX ADMIN — ROPINIROLE HYDROCHLORIDE 2 MG: 1 TABLET, FILM COATED ORAL at 21:42

## 2022-07-27 RX ADMIN — LEVOTHYROXINE SODIUM 25 MCG: 25 TABLET ORAL at 09:10

## 2022-07-27 RX ADMIN — HEPARIN SODIUM 5000 UNITS: 5000 INJECTION INTRAVENOUS; SUBCUTANEOUS at 06:07

## 2022-07-27 RX ADMIN — MELATONIN 6 MG: at 21:43

## 2022-07-27 RX ADMIN — MULTIPLE VITAMINS W/ MINERALS TAB 1 TABLET: TAB ORAL at 09:10

## 2022-07-27 RX ADMIN — OXYCODONE HYDROCHLORIDE 7.5 MG: 5 TABLET ORAL at 06:07

## 2022-07-27 RX ADMIN — HEPARIN SODIUM 5000 UNITS: 5000 INJECTION INTRAVENOUS; SUBCUTANEOUS at 14:31

## 2022-07-27 RX ADMIN — NORTRIPTYLINE HYDROCHLORIDE 100 MG: 50 CAPSULE ORAL at 21:44

## 2022-07-27 RX ADMIN — MORPHINE SULFATE 2 MG: 2 INJECTION, SOLUTION INTRAMUSCULAR; INTRAVENOUS at 19:50

## 2022-07-27 RX ADMIN — Medication 375 MG: at 09:11

## 2022-07-27 RX ADMIN — METHOCARBAMOL TABLETS 750 MG: 750 TABLET, COATED ORAL at 18:34

## 2022-07-27 RX ADMIN — MELOXICAM 7.5 MG: 7.5 TABLET ORAL at 09:13

## 2022-07-27 RX ADMIN — ACETAMINOPHEN 975 MG: 325 TABLET, FILM COATED ORAL at 21:42

## 2022-07-27 RX ADMIN — FOLIC ACID 2000 MCG: 1 TABLET ORAL at 09:10

## 2022-07-27 RX ADMIN — SENNOSIDES AND DOCUSATE SODIUM 1 TABLET: 8.6; 5 TABLET ORAL at 09:10

## 2022-07-27 RX ADMIN — OXYCODONE HYDROCHLORIDE 5 MG: 5 TABLET ORAL at 14:34

## 2022-07-27 RX ADMIN — FERROUS GLUCONATE 324 MG: 324 TABLET ORAL at 09:11

## 2022-07-27 RX ADMIN — OXYCODONE HYDROCHLORIDE 5 MG: 5 TABLET ORAL at 18:35

## 2022-07-27 RX ADMIN — ACETAMINOPHEN 975 MG: 325 TABLET, FILM COATED ORAL at 06:07

## 2022-07-27 NOTE — CASE MANAGEMENT
Case Management Assessment & Discharge Planning Note    Patient name Mikal Terry /-01 MRN [id number]  : 1945 Date 2022       Current Admission Date: 2022  Current Admission Diagnosis:Osteomyelitis [facility])   Patient Active Problem List    Diagnosis Date Noted    Hyponatremia 2022    Osteomyelitis ([facility] [address] ) 2022    Venous insufficiency 2021    Shoulder pain, bilateral 2021    Failed orthopedic implant ([facility] ) 2021    Encounter for annual wellness exam in Medicare patient 2021    S/P reverse total shoulder arthroplasty, left 2021    Rupture long head biceps tendon, left, initial encounter 2021    Acquired subluxation of left shoulder 2021    Depression 2020    Other forms of systemic lupus erythematosus ([facility] [address] ) 2020    Dyspepsia 2019    Vitamin D deficiency 2019    Chronic pain syndrome 2019    Rheumatoid arthritis involving multiple sites with positive rheumatoid factor ([facility] [address] ) 2019    Ambulatory dysfunction 2018    Closed compression fracture of L3 lumbar vertebra 2018    S/P total hip arthroplasty 2017    Anxiety 2017    RLS (restless legs syndrome) 2017    RBBB 2017    Age-related osteoporosis without current pathological fracture 2017    Hypothyroidism due to Hashimoto's thyroiditis 2016      LOS (days): 6  Geometric Mean LOS (GMLOS) (days): 7 60  Days to GMLOS:2     OBJECTIVE:    Risk of Unplanned Readmission Score: 13 4         Current admission status: Inpatient       Preferred Pharmacy:   [address] [place] [address]  Phone: [phone] Fax: [phone]    Primary Care Provider: Sussy Clemons MD    Primary Insurance: MEDICARE  Secondary Insurance:     ASSESSMENT:  [address] 26 Proxies    There are no active Health Care Proxies on file  Readmission Root Cause  30 Day Readmission: No    Patient Information  Admitted from[de-identified] Home  Mental Status: Alert  During Assessment patient was accompanied by: Not accompanied during assessment  Assessment information provided by[de-identified] Patient  Primary Caregiver: Family  Caregiver's Name[de-identified] Eduardo Laws Relationship to Patient[de-identified] Family Member  Caregiver's Telephone Number[de-identified] [phone]  Support Systems: Family members, [facility] of Residence: [address] [place] [address] do you live in?: [employer] entry access options   Select all that apply : Stairs  Number of steps to enter home : 2  Do the steps have railings?: Yes  Type of Current Residence: 2 story home  Upon entering residence, is there a bedroom on the main floor (no further steps)?: Yes  Upon entering residence, is there a bathroom on the main floor (no further steps)?: Yes  In the last 12 months, was there a time when you were not able to pay the mortgage or rent on time?: No  In the last 12 months, how many places have you lived?: 1  In the last 12 months, was there a time when you did not have a steady place to sleep or slept in a shelter (including now)?: No  Homeless/housing insecurity resource given?: N/A  Living Arrangements: Other (Comment) (sister)  Is patient a ?: No    Activities of Daily Living Prior to Admission  Functional Status: Assistance  Completes ADLs independently?: No  Level of ADL dependence: Assistance  Ambulates independently?: Yes  Does patient use assisted devices?: Yes  Assisted Devices (DME) used: Rodgers Cranker, Marciano Cater, Shower Chair, Bedside Commode, Other (Comment) (nisha bars, medical alert)  Does patient currently own DME?: Yes  What DME does the patient currently own?: Bedside Commode, [employer] Chair, Other (Comment) (nisha mora, medical alert)  Does patient have a history of Outpatient Therapy (PT/OT)?: No  Does the patient have a history of Short-Term Rehab?: Yes [facility])  Does patient have a history of HHC?: Yes ([facility]ance [facility])  Does patient currently have [address]?: No         Patient Information Continued  Income Source: Pension/halfway  Does patient have prescription coverage?:  ([address] [facility] [employer] in [place])  Within the past 12 months, you worried that your food would run out before you got the money to buy more : Never true  Within the past 12 months, the food you bought just didn't last and you didn't have money to get more : Never true  Food insecurity resource given?: N/A  Does patient receive dialysis treatments?: No  Does patient have a history of substance abuse?: No  Does patient have a history of Mental Health Diagnosis?: No         Means of Transportation  Means of Transport to Appts[de-identified] Family transport  In the past 12 months, has lack of transportation kept you from medical appointments or from getting medications?: No  In the past 12 months, has lack of transportation kept you from meetings, work, or from getting things needed for daily living?: No  Was application for public transport provided?: N/A        DISCHARGE DETAILS:    Discharge planning discussed with[de-identified] patient and niece by phone  Freedom of Choice: Yes  Comments - Freedom of Choice: agreeable to STR at [place]  CM contacted family/caregiver?: Yes  Were Treatment Team discharge recommendations reviewed with patient/caregiver?: Yes  Did patient/caregiver verbalize understanding of patient care needs?: Yes       Contacts  Patient Contacts: Concepcion Avery  Contact Method: Phone  Phone Number: [phone]  Reason/Outcome: Continuity of Care, Discharge Planning, Emergency [address]         Is the patient interested in [address] at discharge?: No    DME Referral Provided  Referral made for DME?: No    Other Referral/Resources/Interventions Provided:  Interventions: Short Term Rehab  Referral Comments: Family preference is [facility] and [facility]    Would you like to participate in our [address] service program?  : Yes    Treatment Team Recommendation: Short Term Rehab       Additional Comments: Patient's niece Singh Chris says the patient lives with her sister and the 2 of them receive 15 hours of HHA through [employer] and use Meals on Wheels  Singh Perez says family goes to the house each day to assist with the needs  Singh Perez says a family member works for [email or web address] so that is 1st choice for [address] and [facility] is 2nd choice  Singh Perez and patient were made aware multiple STR referrals will be made for DC needs because of possible bed availability issues  Dino Ellis says she has had vaccination for COVID from [employer] and has had 1 boster shot  STR referrals were made via [employer] and awaiting determination for DC needs         CM reviewed d/c planning process including the following: identifying help at home, patient preference for d/c planning needs, Discharge Lounge, [facility] Meds to Bed program, availability of treatment team to discuss questions or concerns patient and/or family may have regarding understanding medications and recognizing signs and symptoms once discharged  CM also encouraged patient to follow up with all recommended appointments after discharge  Patient advised of importance for patient and family to participate in managing patients medical well being  Patient/caregiver received discharge checklist   Content reviewed  Patient/caregiver encouraged to participate in discharge plan of care prior to discharge home

## 2022-07-27 NOTE — PLAN OF CARE
Problem: PAIN - ADULT  Goal: Verbalizes/displays adequate comfort level or baseline comfort level  Description: Interventions:  - Encourage patient to monitor pain and request assistance  - Assess pain using appropriate pain scale  - Administer analgesics based on type and severity of pain and evaluate response  - Implement non-pharmacological measures as appropriate and evaluate response  - Consider cultural and social influences on pain and pain management  - Notify physician/advanced practitioner if interventions unsuccessful or patient reports new pain  Outcome: Progressing     Problem: INFECTION - ADULT  Goal: Absence or prevention of progression during hospitalization  Description: INTERVENTIONS:  - Assess and monitor for signs and symptoms of infection  - Monitor lab/diagnostic results  - Monitor all insertion sites, i e  indwelling lines, tubes, and drains  - Monitor endotracheal if appropriate and nasal secretions for changes in amount and color  - [place] appropriate cooling/warming therapies per order  - Administer medications as ordered  - Instruct and encourage patient and family to use good hand hygiene technique  - Identify and instruct in appropriate isolation precautions for identified infection/condition  Outcome: Progressing  Goal: Absence of fever/infection during neutropenic period  Description: INTERVENTIONS:  - Monitor WBC    Outcome: Progressing     Problem: SAFETY ADULT  Goal: Patient will remain free of falls  Description: INTERVENTIONS:  - Educate patient/family on patient safety including physical limitations  - Instruct patient to call for assistance with activity   - Consult OT/PT to assist with strengthening/mobility   - Keep Call bell within reach  - Keep bed low and locked with side rails adjusted as appropriate  - Keep care items and personal belongings within reach  - Initiate and maintain comfort rounds  - Make Fall Risk Sign visible to staff  - Offer Toileting every **2* Hours, in advance of need  - Initiate/Maintain *bed**alarm                                                                                          - Apply yellow socks and bracelet for high fall risk patients  - Consider moving patient to room near nurses station  Outcome: Progressing  Goal: Maintain or return to baseline ADL function  Description: INTERVENTIONS:  -  Assess patient's ability to carry out ADLs; assess patient's baseline for ADL function and identify physical deficits which impact ability to perform ADLs (bathing, care of mouth/teeth, toileting, grooming, dressing, etc )  - Assess/evaluate cause of self-care deficits   - Assess range of motion  - Assess patient's mobility; develop plan if impaired  - Assess patient's need for assistive devices and provide as appropriate  - Encourage maximum independence but intervene and supervise when necessary  - Involve family in performance of ADLs  - Assess for home care needs following discharge   - Consider OT consult to assist with ADL evaluation and planning for discharge  - Provide patient education as appropriate  Outcome: Progressing  Goal: Maintains/Returns to pre admission functional level  Description: INTERVENTIONS:  - Perform BMAT or MOVE assessment daily    - Set and communicate daily mobility goal to care team and patient/family/caregiver  - Collaborate with rehabilitation services on mobility goals if consulted  - Perform Range of Motion *3** times a day  - Reposition patient every **2* hours    - Dangle patient **3* times a day  - Stand patient **3* times a day  - Ambulate patient *3** times a day  - Out of bed to chair **3* times a day   - Out of bed for meals *3** times a day  - Out of bed for toileting  - Record patient progress and toleration of activity level   Outcome: Progressing     Problem: DISCHARGE PLANNING  Goal: Discharge to home or other facility with appropriate resources  Description: INTERVENTIONS:  - Identify barriers to discharge w/patient and caregiver  - Arrange for needed discharge resources and transportation as appropriate  - Identify discharge learning needs (meds, wound care, etc )  - Arrange for interpretive services to assist at discharge as needed  - Refer to Case Management Department for coordinating discharge planning if the patient needs post-hospital services based on physician/advanced practitioner order or complex needs related to functional status, cognitive ability, or social support system  Outcome: Progressing     Problem: Knowledge Deficit  Goal: Patient/family/caregiver demonstrates understanding of disease process, treatment plan, medications, and discharge instructions  Description: Complete learning assessment and assess knowledge base    Interventions:  - Provide teaching at level of understanding  - Provide teaching via preferred learning methods  Outcome: Progressing     Problem: MOBILITY - ADULT  Goal: Maintain or return to baseline ADL function  Description: INTERVENTIONS:  -  Assess patient's ability to carry out ADLs; assess patient's baseline for ADL function and identify physical deficits which impact ability to perform ADLs (bathing, care of mouth/teeth, toileting, grooming, dressing, etc )  - Assess/evaluate cause of self-care deficits   - Assess range of motion  - Assess patient's mobility; develop plan if impaired  - Assess patient's need for assistive devices and provide as appropriate  - Encourage maximum independence but intervene and supervise when necessary  - Involve family in performance of ADLs  - Assess for home care needs following discharge   - Consider OT consult to assist with ADL evaluation and planning for discharge  - Provide patient education as appropriate  Outcome: Progressing  Goal: Maintains/Returns to pre admission functional level  Description: INTERVENTIONS:  - Perform BMAT or MOVE assessment daily    - Set and communicate daily mobility goal to care team and patient/family/caregiver  - Collaborate with rehabilitation services on mobility goals if consulted  - Perform Range of Motion *3** times a day  - Reposition patient every *2** hours  - Dangle patient **3* times a day  - Stand patient **3* times a day  - Ambulate patient *3** times a day  - Out of bed to chair **3* times a day   - Out of bed for meals *3** times a day  - Out of bed for toileting  - Record patient progress and toleration of activity level   Outcome: Progressing     Problem: Prexisting or High Potential for Compromised Skin Integrity  Goal: Skin integrity is maintained or improved  Description: INTERVENTIONS:  - Identify patients at risk for skin breakdown  - Assess and monitor skin integrity  - Assess and monitor nutrition and hydration status  - Monitor labs   - Assess for incontinence   - Turn and reposition patient  - Assist with mobility/ambulation  - Relieve pressure over bony prominences  - Avoid friction and shearing  - Provide appropriate hygiene as needed including keeping skin clean and dry  - Evaluate need for skin moisturizer/barrier cream  - Collaborate with interdisciplinary team   - Patient/family teaching  - Consider wound care consult   Outcome: Progressing     Problem: Nutrition/Hydration-ADULT  Goal: Nutrient/Hydration intake appropriate for improving, restoring or maintaining nutritional needs  Description: Monitor and assess patient's nutrition/hydration status for malnutrition  Collaborate with interdisciplinary team and initiate plan and interventions as ordered  Monitor patient's weight and dietary intake as ordered or per policy  Utilize nutrition screening tool and intervene as necessary  Determine patient's food preferences and provide high-protein, high-caloric foods as appropriate       INTERVENTIONS:  - Monitor oral intake, urinary output, labs, and treatment plans  - Assess nutrition and hydration status and recommend course of action  - Evaluate amount of meals eaten  - Assist patient with eating if necessary   - Allow adequate time for meals  - Recommend/ encourage appropriate diets, oral nutritional supplements, and vitamin/mineral supplements  - Order, calculate, and assess calorie counts as needed  - Recommend, monitor, and adjust tube feedings and TPN/PPN based on assessed needs  - Assess need for intravenous fluids  - Provide specific nutrition/hydration education as appropriate  - Include patient/family/caregiver in decisions related to nutrition  Outcome: Progressing

## 2022-07-27 NOTE — ASSESSMENT & PLAN NOTE
· Presented to the ED with increased L foot pain  Patient with left plantar foot ulceration, admitted to Podiatry service    · XR concerning for bone involvement   · Local wound care per primary   · S/p OR resection of 5th Metatarsal head 7/24  · Antibiotics were discontinued post-op  · Pain control   · PT/OT postoperatively

## 2022-07-27 NOTE — PHYSICAL THERAPY NOTE
PHYSICAL THERAPY NOTE          Patient Name: Babs Mccullough  Today's Date: 7/27/2022 07/27/22 1155   PT Last Visit   PT Visit Date 07/27/22   Note Type   Note Type Treatment   Pain Assessment   Pain Assessment Tool 0-10   Pain Score No Pain   Restrictions/Precautions   LLE Weight Bearing Per Order NWB  (reviewed w/ the pt)   Other Precautions Multiple lines;Telemetry;WBS;Fall Risk   General   Chart Reviewed Yes   Additional Pertinent History cleared for Tx session (spoke to nsg)   Response to Previous Treatment Patient with no complaints from previous session  Cognition   Overall Cognitive Status WFL   Arousal/Participation Alert; Cooperative   Attention Attends with cues to redirect   Orientation Level Oriented to person;Oriented to place;Oriented to situation   Memory Within functional limits   Following Commands Follows one step commands without difficulty   Subjective   Subjective Alert; in bed; agreeable to mobilize   Bed Mobility   Rolling R 3  Moderate assistance   Additional items Assist x 2; Increased time required;Verbal cues;LE management   Rolling L 3  Moderate assistance   Additional items Assist x 2; Increased time required;Verbal cues;LE management   Supine to Sit 2  Maximal assistance   Additional items Assist x 2; Increased time required;Verbal cues;LE management   Sit to Supine 2  Maximal assistance   Additional items Assist x 2; Increased time required;Verbal cues;LE management  (repositioned higher in bed w/ (A)x2)   Transfers   Sit to Stand 2  Maximal assistance  (2 trials; extensive instruction and (A) to maintain NWB on (L) LE)   Additional items Assist x 2; Increased time required;Verbal cues   Stand to Sit 2  Maximal assistance  (2 trials)   Additional items Assist x 2; Increased time required;Verbal cues   Additional Comments Standing trials performed w/ (A)x2 and side by side approach w/ use of bed pad Balance   Static Sitting Poor +   Dynamic Sitting Poor   Static Standing Poor -   Dynamic Standing Poor -   Activity Tolerance   Activity Tolerance Patient limited by fatigue   Nurse Made Aware spoke to RN   Exercises   Knee AROM Long Arc Quad Sitting;15 reps;AAROM; Bilateral  (2 sets)   Marching Sitting;AAROM; Bilateral;5 reps  (2 sets)   Balance training  Sitting balance/tolerance training on edge of bed x 5 min   Assessment   Prognosis Fair   Problem List Decreased strength;Decreased range of motion;Decreased endurance; Impaired balance;Decreased mobility;Orthopedic restrictions   Assessment Pt was able to progress to full standing trials this session at bedside but required (A)x2 to facilitate the transitions and appeared to have inconsistent ability to maintain NWB on (L) LE (only a few sec of standing was therefore attempted); LE therex was also initiated and performed in an AAROM mode; pt remained in NAD and appeared to be comfortable at the end of session  Overall, cont to recommend rehab upon D/C when medically cleared; will follow  Barriers to Discharge Inaccessible home environment   Goals   Patient Goals to move better   [facility] Expiration Date 08/04/22   PT Treatment Day 1   Plan   Treatment/Interventions Functional transfer training;LE strengthening/ROM; Therapeutic exercise; Endurance training;Bed mobility;Spoke to nursing;Spoke to case management   Progress Progressing toward goals   PT Frequency 3-5x/wk   Recommendation   PT Discharge Recommendation Post acute rehabilitation services   AM-PAC Basic Mobility Inpatient   Turning in Bed Without Bedrails 2   Lying on Back to Sitting on Edge of Flat Bed 2   Moving Bed to Chair 1   Standing Up From Chair 1   Walk in Room 1   Climb 3-5 Stairs 1   Basic Mobility Inpatient Raw Score 8   Turning Head Towards Sound 4   Follow Simple Instructions 4   Low Function Basic Mobility Raw Score 16   Low Function Basic Mobility Standardized Score 25 72   Highest Level Of Mobility   JH-HLM Goal 3: Sit at edge of bed   JH-HLM Achieved 3: Sit at edge of bed   Education   Education Provided Mobility training; Other  (WB status)   End of Consult   Patient Position at End of Consult Supine; All needs within reach     [facility],

## 2022-07-27 NOTE — PROGRESS NOTES
Podiatry - Progress Note  Patient: Brandee Perry Colon 65 y o  female   MRN: [id number]  PCP: Arlene Hillman MD  Unit/Bed#: -24 Encounter: [id number]  Date Of Visit: 22    ASSESSMENT:    Richard Sotomayor is a 65 y o  female with:    1  Left Plantar 5th metatarsal head ulceration with underlying osteomyleitis- Hernandez 3-POA  - S/p left partial 5th ray resection with flap closure (DOS 22)  2  Left leg venous stasis ulcer-POA  3  Chronic venous hypertension  4  Rheumatoid Arthritis   5  Hypothyroidism due to Hashimoto's disease  6  Restless Leg Syndrome    PLAN:    · Plan to continue monitoring incision sites  Plantar incision site is stable  Drain incision site shows no erythema or edema, but very mild cloudy discharge  · Drain removed  Dressings applied today: xeroform, 4x4 gauze, ABD, kerlix, ACE  · Elevation and offloading on green foam wedges or pillows when non-ambulatory  Weightbearing status: Non-weightbearing left  foot    SUBJECTIVE:     The patient was seen, evaluated, and assessed at bedside today  The patient was awake, alert, and in no acute distress  No acute events overnight  The patient reports pain on dressing change  Patient denies N/V/F/chills/SOB/CP  OBJECTIVE:     Vitals:   /63   Pulse 74   Temp (!) 97 4 °F (36 3 °C)   Resp 18   Ht 4' 10" (1 473 m)   Wt 45 4 kg (100 lb)   SpO2 97%   BMI 20 90 kg/m²     Temp (24hrs), Av 5 °F (36 4 °C), Min:97 4 °F (36 3 °C), Max:97 5 °F (36 4 °C)      Physical Exam:     General Appearance: Alert, cooperative, no distress  Lower Extremity:    Vascular:   DP: Right: 2+ Left: 2+  PT: Right: 2+ Left: 2+  CRT < 3 seconds at the digits  CRT <3 seconds to flap  No edema noted to b/l lower extremities  Pedal hair is absent bilaterally  Skin temperature is WNL bilaterally  Musculoskeletal:  MMT is 5/5 in all muscle compartments bilaterally  Pain on palpation of left foot  No gross deformities noted  Dermatological:  All sutures intact, incision well coapted  No edema or erythema noted  No discharge noted to plantar incision, mild cloudy discharge noted to dorsal drain incision  Drain noted to have about 5mL of bloody discharge  Neurological:  Gross sensation is intact  Light touch is intact  Proprioception is intact  Clinical Images 07/27/22: Additional Data:     Labs:    Results from last 7 days   Lab Units 07/27/22  0508 07/23/22  0608   WBC Thousand/uL 7 91 4 38   HEMOGLOBIN g/dL 9 7* 9 4*   HEMATOCRIT % 30 7* 30 7*   PLATELETS Thousands/uL 395* 369   NEUTROS PCT %  --  66   LYMPHS PCT %  --  20   MONOS PCT %  --  10   EOS PCT %  --  2     Results from last 7 days   Lab Units 07/27/22  0508 07/23/22  0608 07/21/22  2020   POTASSIUM mmol/L 3 6   < > 3 3*   CHLORIDE mmol/L 102   < > 108   CO2 mmol/L 29   < > 25   BUN mg/dL 11   < > 8   CREATININE mg/dL 0 37*   < > 0 45*   CALCIUM mg/dL 8 4   < > 8 8   ALK PHOS U/L  --   --  85   ALT U/L  --   --  25   AST U/L  --   --  34    < > = values in this interval not displayed  * I Have Reviewed All Lab Data Listed Above  Recent Cultures (last 7 days):     Results from last 7 days   Lab Units 07/24/22  0839 07/21/22  1950   GRAM STAIN RESULT  No Polys  No organisms seen Rare Gram positive cocci in pairs*  No polys seen*   WOUND CULTURE   --  2+ Growth of Staphylococcus aureus*  1+ Growth of Pseudomonas aeruginosa*  1+ Growth of - Acinetobacter pittii  / Acinetobacter species*     Results from last 7 days   Lab Units 07/24/22  0839   ANAEROBIC CULTURE  No anaerobes isolated       Imaging: I have personally reviewed pertinent films in PACS  EKG, Pathology, and Other Studies: I have personally reviewed pertinent reports  ** Please Note: Portions of the record may have been created with voice recognition software   Occasional wrong word or "sound a like" substitutions may have occurred due to the inherent limitations of voice recognition software  Read the chart carefully and recognize, using context, where substitutions have occurred   **

## 2022-07-27 NOTE — PROGRESS NOTES
[facility]  Progress Note Palomo Nisha Colon 1945, 65 y o  female MRN: [id number]  Unit/Bed#: MS Ariza5-01 Encounter: [id number]  Primary Care Provider: Ana Messina MD   Date and time admitted to hospital: 7/21/2022  5:59 PM    * Osteomyelitis [facility])  Assessment & Plan  · Presented to the ED with increased L foot pain  Patient with left plantar foot ulceration, admitted to Podiatry service  · XR concerning for bone involvement   · Local wound care per primary   · S/p OR resection of 5th Metatarsal head 7/24  · Antibiotics were discontinued post-op  · Pain control   · PT/OT postoperatively     Hyponatremia  Assessment & Plan  · Mild, was suspected due to dehydration in the setting of NPO for surgery   · Continue with IV fluids for now   · Encourage PO intake   · Check BMP    Rheumatoid arthritis involving multiple sites with positive rheumatoid factor (HCC)  Assessment & Plan  · Continue prednisone 5 mg daily and methotrexate per PTA medication regimen     Hypothyroidism due to Hashimoto's thyroiditis  Assessment & Plan  · Continue synthroid therapy     Anxiety  Assessment & Plan  · Mood appears stable  · Would avoid benzos if possible         VTE Pharmacologic Prophylaxis: VTE Score: 6 High Risk (Score >/= 5) - Pharmacological DVT Prophylaxis Ordered: heparin  Sequential Compression Devices Ordered  Patient Centered Rounds: I performed bedside rounds with nursing staff today  Discussions with Specialists or Other Care Team Provider:     Education and Discussions with Family / Patient: patient  Time Spent for Care: 20 minutes  More than 50% of total time spent on counseling and coordination of care as described above  Current Length of Stay: 6 day(s)  Current Patient Status: Inpatient   Certification Statement: per primary, Podiatry  Discharge Plan: per primary, Podiatry   NAI will continue to follow    Code Status: Level 1 - Full Code    Subjective: Ms Per Jung reports pain after dressing change in foot  Otherwise she denies complaint  Report says the food here is good    Objective:     Vitals:   Temp (24hrs), Av 5 °F (36 4 °C), Min:97 4 °F (36 3 °C), Max:97 5 °F (36 4 °C)    Temp:  [97 4 °F (36 3 °C)-97 5 °F (36 4 °C)] 97 4 °F (36 3 °C)  HR:  [74-81] 74  Resp:  [16-18] 18  BP: (119-133)/(63-95) 119/63  SpO2:  [96 %-97 %] 97 %  Body mass index is 20 9 kg/m²  Input and Output Summary (last 24 hours): Intake/Output Summary (Last 24 hours) at 2022 0939  Last data filed at 2022 0745  Gross per 24 hour   Intake 225 ml   Output 800 ml   Net -575 ml       Physical Exam:   Physical Exam  Vitals reviewed  Exam conducted with a chaperone present  Constitutional:       Comments: Patient seen sitting in bed comfortably resting, RN present, NAD   Cardiovascular:      Rate and Rhythm: Normal rate and regular rhythm  Pulmonary:      Effort: Pulmonary effort is normal  No respiratory distress  Breath sounds: Normal breath sounds  Abdominal:      General: Bowel sounds are normal       Palpations: Abdomen is soft  Tenderness: There is no abdominal tenderness  Musculoskeletal:      Right lower leg: No edema  Left lower leg: No edema  Skin:     General: Skin is warm  Neurological:      Mental Status: She is alert and oriented to person, place, and time     Psychiatric:         Mood and Affect: Mood normal          Behavior: Behavior normal         Additional Data:     Labs:  Results from last 7 days   Lab Units 22  0508 22  0608   WBC Thousand/uL 7 91 4 38   HEMOGLOBIN g/dL 9 7* 9 4*   HEMATOCRIT % 30 7* 30 7*   PLATELETS Thousands/uL 395* 369   NEUTROS PCT %  --  66   LYMPHS PCT %  --  20   MONOS PCT %  --  10   EOS PCT %  --  2     Results from last 7 days   Lab Units 22  0508 22  0608 22   SODIUM mmol/L 134*   < > 140   POTASSIUM mmol/L 3 6   < > 3 3*   CHLORIDE mmol/L 102   < > 108   CO2 mmol/L 29   < > 25   BUN mg/dL 11   < > 8   CREATININE mg/dL 0 37*   < > 0 45*   ANION GAP mmol/L 3*   < > 7   CALCIUM mg/dL 8 4   < > 8 8   ALBUMIN g/dL  --   --  2 8*   TOTAL BILIRUBIN mg/dL  --   --  0 37   ALK PHOS U/L  --   --  85   ALT U/L  --   --  25   AST U/L  --   --  34   GLUCOSE RANDOM mg/dL 81   < > 83    < > = values in this interval not displayed  Lines/Drains:  Invasive Devices  Report    Peripheral Intravenous Line  Duration           Peripheral IV 07/24/22 Left;Proximal;Ventral (anterior) Forearm 3 days          Drain  Duration           Closed/Suction Drain Left;Posterior; Lateral Foot Bulb 10 Fr  3 days                      Imaging: No pertinent imaging reviewed      Recent Cultures (last 7 days):   Results from last 7 days   Lab Units 07/24/22  0839 07/21/22 1950   GRAM STAIN RESULT  No Polys  No organisms seen Rare Gram positive cocci in pairs*  No polys seen*   WOUND CULTURE   --  2+ Growth of Staphylococcus aureus*  1+ Growth of Pseudomonas aeruginosa*  1+ Growth of - Acinetobacter pittii  / Acinetobacter species*       Last 24 Hours Medication List:   Current Facility-Administered Medications   Medication Dose Route Frequency Provider Last Rate    acetaminophen  975 mg Oral Q8H [address] Alesia Gordillo PA-C      Ascorbic Acid (Vitamin C)  375 mg Oral Daily Odette Skiff, DPM      fentaNYL  25 mcg Intravenous Q3 min PRN Leonor Tavera CRNA      ferrous gluconate  324 mg Oral Daily Odette Skiff, DPM      folic acid  2,440 mcg Oral Daily Odette Skiff, DPM      heparin (porcine)  5,000 Units Subcutaneous [facility] Odette Skiff, DPM      hydroxychloroquine  200 mg Oral Daily With Breakfast Odette Skiff, DPM      hydrOXYzine HCL  25 mg Oral Q6H PRN Odette Skiff, DPM      lactated ringers  75 mL/hr Intravenous Continuous Leonor Tavera CRNA Stopped (07/25/22 1742)    levothyroxine  25 mcg Oral Daily Odette Skiff, DPM      melatonin  6 mg Oral HS Odette Skiff, DPM      meloxicam  7 5 mg Oral Daily Theresa Brennan, DPM      methocarbamol  750 mg Oral Q6H PRN Theresa Brennan, DPM      methotrexate  2 5 mg Oral Weekly Theresa Brennan, DPM      morphine injection  2 mg Intravenous Q4H PRN Shandra Hoguet Held, ANGELA      multivitamin-minerals  1 tablet Oral Daily Theresa Brennan, DPM      naloxone  0 04 mg Intravenous Q1MIN PRN Theresa Brennan, DPM      nortriptyline  100 mg Oral HS Theresa Brennan, DPM      ondansetron  4 mg Intravenous Q4H PRN Theresa Brennan, DPM      oxyCODONE  5 mg Oral Q4H PRN Alesia E Held, ANGELA      oxyCODONE  7 5 mg Oral Q4H PRN Alesia E Held, ANGELA      polyethylene glycol  17 g Oral Daily PRN Theresa Brennan, DPM      predniSONE  5 mg Oral Daily Theresa Brennan, DPM      rOPINIRole  2 mg Oral HS Theresa Brennan, DPM      senna-docusate sodium  1 tablet Oral BID Theresa Brennan, DPM          Today, Patient Was Seen By: Shamar White PA-C    **Please Note: This note may have been constructed using a voice recognition system  **

## 2022-07-27 NOTE — PLAN OF CARE
Problem: PHYSICAL THERAPY ADULT  Goal: Performs mobility at highest level of function for planned discharge setting  See evaluation for individualized goals  Description: Treatment/Interventions: Functional transfer training, LE strengthening/ROM, Therapeutic exercise, Endurance training, Equipment eval/education, Bed mobility, Spoke to nursing, OT  Equipment Recommended: Other (Comment) (r/o rw during follow up visits)       See flowsheet documentation for full assessment, interventions and recommendations  Outcome: Progressing  Note: Prognosis: Fair  Problem List: Decreased strength, Decreased range of motion, Decreased endurance, Impaired balance, Decreased mobility, Orthopedic restrictions  Assessment: Pt was able to progress to full standing trials this session at bedside but required (A)x2 to facilitate the transitions and appeared to have inconsistent ability to maintain NWB on (L) LE (only a few sec of standing was therefore attempted); LE therex was also initiated and performed in an AAROM mode; pt remained in NAD and appeared to be comfortable at the end of session  Overall, cont to recommend rehab upon D/C when medically cleared; will follow  Barriers to Discharge: Inaccessible home environment     PT Discharge Recommendation: Post acute rehabilitation services    See flowsheet documentation for full assessment

## 2022-07-28 PROCEDURE — NC001 PR NO CHARGE: Performed by: PODIATRIST

## 2022-07-28 PROCEDURE — 92526 ORAL FUNCTION THERAPY: CPT

## 2022-07-28 PROCEDURE — 99232 SBSQ HOSP IP/OBS MODERATE 35: CPT | Performed by: INTERNAL MEDICINE

## 2022-07-28 RX ORDER — OXYCODONE HYDROCHLORIDE 10 MG/1
10 TABLET ORAL EVERY 4 HOURS PRN
Status: DISCONTINUED | OUTPATIENT
Start: 2022-07-28 | End: 2022-08-03 | Stop reason: HOSPADM

## 2022-07-28 RX ADMIN — HEPARIN SODIUM 5000 UNITS: 5000 INJECTION INTRAVENOUS; SUBCUTANEOUS at 05:46

## 2022-07-28 RX ADMIN — HYDROXYCHLOROQUINE SULFATE 200 MG: 200 TABLET ORAL at 09:05

## 2022-07-28 RX ADMIN — OXYCODONE HYDROCHLORIDE 5 MG: 5 TABLET ORAL at 14:44

## 2022-07-28 RX ADMIN — ACETAMINOPHEN 975 MG: 325 TABLET, FILM COATED ORAL at 05:45

## 2022-07-28 RX ADMIN — MULTIPLE VITAMINS W/ MINERALS TAB 1 TABLET: TAB ORAL at 09:04

## 2022-07-28 RX ADMIN — HEPARIN SODIUM 5000 UNITS: 5000 INJECTION INTRAVENOUS; SUBCUTANEOUS at 14:47

## 2022-07-28 RX ADMIN — OXYCODONE HYDROCHLORIDE 5 MG: 5 TABLET ORAL at 05:45

## 2022-07-28 RX ADMIN — NORTRIPTYLINE HYDROCHLORIDE 100 MG: 50 CAPSULE ORAL at 21:46

## 2022-07-28 RX ADMIN — HEPARIN SODIUM 5000 UNITS: 5000 INJECTION INTRAVENOUS; SUBCUTANEOUS at 21:45

## 2022-07-28 RX ADMIN — MORPHINE SULFATE 2 MG: 2 INJECTION, SOLUTION INTRAMUSCULAR; INTRAVENOUS at 09:02

## 2022-07-28 RX ADMIN — METHOTREXATE 2.5 MG: 2.5 TABLET ORAL at 09:05

## 2022-07-28 RX ADMIN — FERROUS GLUCONATE 324 MG: 324 TABLET ORAL at 09:07

## 2022-07-28 RX ADMIN — ACETAMINOPHEN 975 MG: 325 TABLET, FILM COATED ORAL at 14:44

## 2022-07-28 RX ADMIN — PREDNISONE 5 MG: 5 TABLET ORAL at 09:04

## 2022-07-28 RX ADMIN — MELATONIN 6 MG: at 21:45

## 2022-07-28 RX ADMIN — METHOCARBAMOL TABLETS 750 MG: 750 TABLET, COATED ORAL at 19:43

## 2022-07-28 RX ADMIN — METHOCARBAMOL TABLETS 750 MG: 750 TABLET, COATED ORAL at 09:04

## 2022-07-28 RX ADMIN — LEVOTHYROXINE SODIUM 25 MCG: 25 TABLET ORAL at 09:04

## 2022-07-28 RX ADMIN — ROPINIROLE HYDROCHLORIDE 2 MG: 1 TABLET, FILM COATED ORAL at 21:45

## 2022-07-28 RX ADMIN — OXYCODONE HYDROCHLORIDE 5 MG: 5 TABLET ORAL at 19:43

## 2022-07-28 RX ADMIN — MELOXICAM 7.5 MG: 7.5 TABLET ORAL at 09:07

## 2022-07-28 RX ADMIN — Medication 375 MG: at 09:06

## 2022-07-28 RX ADMIN — SENNOSIDES AND DOCUSATE SODIUM 1 TABLET: 8.6; 5 TABLET ORAL at 09:04

## 2022-07-28 RX ADMIN — FOLIC ACID 2000 MCG: 1 TABLET ORAL at 09:03

## 2022-07-28 NOTE — ASSESSMENT & PLAN NOTE
· Presented to the ED with increased L foot pain  Patient with left plantar foot ulceration, admitted to Podiatry service    · XR concerning for bone involvement   · Local wound care per primary   · S/p OR resection of 5th Metatarsal head 7/24  · Antibiotics were discontinued post-op  · Pain control   · PT/OT recommending rehab

## 2022-07-28 NOTE — ASSESSMENT & PLAN NOTE
· Mild, was suspected due to dehydration in the setting of NPO for surgery   · Hold IV fluids  · Encourage PO intake   · Check BMP

## 2022-07-28 NOTE — PLAN OF CARE
Problem: INFECTION - ADULT  Goal: Absence or prevention of progression during hospitalization  Description: INTERVENTIONS:  - Assess and monitor for signs and symptoms of infection  - Monitor lab/diagnostic results  - Monitor all insertion sites, i e  indwelling lines, tubes, and drains  - Monitor endotracheal if appropriate and nasal secretions for changes in amount and color  - Jordan appropriate cooling/warming therapies per order  - Administer medications as ordered  - Instruct and encourage patient and family to use good hand hygiene technique  - Identify and instruct in appropriate isolation precautions for identified infection/condition  Outcome: Progressing     Problem: PAIN - ADULT  Goal: Verbalizes/displays adequate comfort level or baseline comfort level  Description: Interventions:  - Encourage patient to monitor pain and request assistance  - Assess pain using appropriate pain scale  - Administer analgesics based on type and severity of pain and evaluate response  - Implement non-pharmacological measures as appropriate and evaluate response  - Consider cultural and social influences on pain and pain management  - Notify physician/advanced practitioner if interventions unsuccessful or patient reports new pain  Outcome: Progressing

## 2022-07-28 NOTE — DISCHARGE SUMMARY
PODIATRY DISCHARGE SUMMARY     Patient Name: Dwayne Pacheco   Age & Sex: 65 y o  female   MRN: [id number]  Unit/Bed#: -01   Encounter: [id number]  Length of Stay: 7 days    Principal Problem:    Osteomyelitis ([address] )  Active Problems:    Anxiety    Hypothyroidism due to Hashimoto's thyroiditis    Rheumatoid arthritis involving multiple sites with positive rheumatoid factor ([address] )    Hyponatremia      HPI from Admission:  Dwayne Pacheco is a 65 y o  female with pmh of Rheumatoid arthritis, restless legs syndrome, anxiety, hypothyroidism and age-related osteoporosis who presents with left foot  wound  She states that she has had this wound for approximately 2 months, which started as a small callus on the bottom of her foot  She states that over the past 2 months she noticed the wound has slowly increased in size and depth  At home her niece cares for her and dresses her wound for her daily  Patient admits to chills,  however denies fever, nausea, vomiting, shortness of breath, chest pain  She is very anxious during exam and gathering her medical history  She also states that her restless legs make it difficult for her to sit still when being examined, which only increases her anxiety  [address] is a 65 y o  female who was admitted on 7/21/2022 for left plantar foot ulceration  Left foot ulceration probed deep to the 5th MTPJ capsule, with purulent drainage present  On admission, local wound care was provided, and diagnostic imaging was ordered, including radiographs of the left foot  Clinical and radiographic information was used to determine best course of treatment, left 5th metatarsal head resection with flap for closure  Left foot 5th metatarsal head resection, with bilobed flap for skin closure was performed on 7/24/2022  Sutures are intact with skin edges well coapted  Neurovascular status of the extremity is intact  No clinical signs of infection present   RISHI drain removed without incident  Patient appears stable for discharge from podiatry, and will follow up outpatient for the remainder of her surgical aftercare  Instructed patient to leave dressing clean, dry, and intact  She will follow up within 1 week of her discharge  DISCHARGE INFORMATION     PCP at Discharge: Raya Aguirre MD    Admitting Provider: Dr Marcel Fitzgerald  Admission Date: 7/21/2022     Discharge Provider: Dr Zoie Simms  Discharge Date: 8/3/22    Discharge Disposition: Short term rehab  Discharge Condition: Good  Discharge with Lines: No  Discharge Diet: Regular  Activity Restrictions: NWB left foot  Test Results Pending at Discharge: None  Medications at Discharge: See after visit summary for reconciled discharge medications provided to patient and family  Discharge Diagnoses:  Principal Problem:    Osteomyelitis ([address] )  Active Problems:    Anxiety    Hypothyroidism due to Hashimoto's thyroiditis    Rheumatoid arthritis involving multiple sites with positive rheumatoid factor (HCC)    Hyponatremia      Consulting Providers:  Internal medicine   Physical Therapy  Occupational Therapy     Diagnostic Imaging Performed:  XR foot 3+ vw left    Result Date: 7/25/2022  Impression: Status post resection of 5th metatarsal head  Workstation performed: [id number]     XR foot 3+ vw left    Result Date: 7/22/2022  Impression: Deep ulceration at the plantar and lateral aspect of the forefoot  No periostitis or erosive changes seen MRI can be considered to evaluate for osteomyelitis in the context of presence of for deep ulceration with a paucity of erosive changes The study was marked in EPIC for significant notification   Workstation performed: [id number]       Procedures Performed:  Procedure(s):  5TH METATARSAL RESECTION WITH BOTTOM FLAP CLOSURE      Code Status: Level 1 - Full Code  Advance Directive and Living Will:      Power of :    POLST:      FOLLOW-UP     PCP Outpatient Follow-up:  Yes    Consulting Providers Follow-up:  Internal medicine    Active Issues Requiring Follow-Up:  Post-op- Left 5th Metatarsal head resection, with bilobed flap  Hypothyroidism  Rheumatoid Arthritis  Hyponatremia-please follow up with BMP 1 week after discharge     Discharge Statement:  I spent 25 minutes discharging the patient  This time was spent on the day of discharge  I had direct contact with the patient on the day of discharge  Additional documentation is required if more than 30 minutes were spent on discharge  Portions of the record may have been created with voice recognition software  Occasional wrong word or "sound a like" substitutions may have occurred due to the inherent limitations of voice recognition software    Read the chart carefully and recognize, using context, where substitutions have occurred   ==  Kimberly Alexandre, [address] [facility]  Podiatric Medicine & Surgery

## 2022-07-28 NOTE — SPEECH THERAPY NOTE
Speech/Language Pathology Progress Note    Patient Name: Leland Mayberry  Today's Date: 7/28/2022     Subjective:  Pt awake, sitting up in bed for lunch  Objective:  Pt seen for dysphagia therapy  Current diet is dysphagia 3 dental soft with thin liquids  Pt was seen with her lunch, which included iced tea and chopped pasta with sauce and cheese  Pt took drinks via straw with adequate bolus retrieval and control  Swallows were audible however no s/s aspiration occurred  Mastication of pasta was [place]  Pt stated she is happt with the soft diet she is on however her appetite is limited  Assessment:  Swallow remains appropriate for dysphagia 3 dental soft and thin liquids  Pt reported it is more difficult to swallow when reclined in bed  She was assisted with positioning to sit more upright, which she felt was helpful for more ease in swallowing  Plan/Recommendations:  Continue dysphagia 3 dental soft diet and thin liquids  No additional ST f/u needed at this time

## 2022-07-28 NOTE — PROGRESS NOTES
[facility]  Progress Note Marina Barfield Colon 1945, 65 y o  female MRN: [id number]  Unit/Bed#: MS Ariza5-01 Encounter: [id number]  Primary Care Provider: Jean Reddy MD   Date and time admitted to hospital: 7/21/2022  5:59 PM    Hyponatremia  Assessment & Plan  · Mild, was suspected due to dehydration in the setting of NPO for surgery   · Hold IV fluids  · Encourage PO intake   · Check BMP    Rheumatoid arthritis involving multiple sites with positive rheumatoid factor (HCC)  Assessment & Plan  · Continue prednisone 5 mg daily and methotrexate per PTA medication regimen     Hypothyroidism due to Hashimoto's thyroiditis  Assessment & Plan  · Continue synthroid therapy     Anxiety  Assessment & Plan  · Mood appears stable  · Would avoid benzos if possible     * Osteomyelitis ([facility] [address] )  Assessment & Plan  · Presented to the ED with increased L foot pain  Patient with left plantar foot ulceration, admitted to Podiatry service  · XR concerning for bone involvement   · Local wound care per primary   · S/p OR resection of 5th Metatarsal head 7/24  · Antibiotics were discontinued post-op  · Pain control   · PT/OT recommending rehab      VTE Pharmacologic Prophylaxis:   Pharmacologic: Heparin  Mechanical VTE Prophylaxis in Place: Yes    Patient Centered Rounds: I have performed bedside rounds with nursing staff today  Discussions with Specialists or Other Care Team Provider:     Education and Discussions with Family / Patient: Care plan discussed with patient who voiced understanding and agrees with recommendations  Time Spent for Care: 30 minutes  More than 50% of total time spent on counseling and coordination of care as described above      Current Length of Stay: 7 day(s)    Current Patient Status: Inpatient   Certification Statement: The patient will continue to require additional inpatient hospital stay due to Treatment of osteomyelitis    Discharge Plan:   As per primary team    Code Status: Level 1 - Full Code      Subjective:    patient seen examined bedside, no acute distress but does report 7/10 pain in the amputation site  Will increase oxycodone for severe pain from 7 5 to 10 mg  Otherwise labs vitals appears stable; will repeat morning labs  Continue   Treatment for hypothyroidism as well as rheumatoid arthritis  Noted to be slightly hyponatremic on yesterday's labs; likely her baseline at 134  Repeat labs tomorrow and consider fluid restriction if worsens  PT recommending post acute rehab  All rest as per primary team     Objective:     Vitals:   Temp (24hrs), Av 9 °F (36 6 °C), Min:97 6 °F (36 4 °C), Max:98 2 °F (36 8 °C)    Temp:  [97 6 °F (36 4 °C)-98 2 °F (36 8 °C)] 97 9 °F (36 6 °C)  HR:  [71-81] 81  Resp:  [16-18] 16  BP: (129-162)/(69-84) 156/80  SpO2:  [98 %] 98 %  Body mass index is 20 9 kg/m²  Input and Output Summary (last 24 hours): Intake/Output Summary (Last 24 hours) at 2022 1720  Last data filed at 2022 1213  Gross per 24 hour   Intake 180 ml   Output 1000 ml   Net -820 ml       Physical Exam:     Physical Exam  Vitals and nursing note reviewed  Constitutional:       General: She is not in acute distress  Appearance: She is well-developed  HENT:      Head: Normocephalic and atraumatic  Eyes:      Conjunctiva/sclera: Conjunctivae normal    Cardiovascular:      Rate and Rhythm: Normal rate  Heart sounds: No murmur heard  Pulmonary:      Effort: Pulmonary effort is normal  No respiratory distress  Abdominal:      Palpations: Abdomen is soft  Tenderness: There is no abdominal tenderness  Musculoskeletal:         General: Deformity and signs of injury present  Cervical back: Neck supple  Skin:     General: Skin is warm and dry  Findings: Erythema and rash present  Neurological:      Mental Status: She is alert             Additional Data:     Labs:    Results from last 7 days   Lab Units 07/27/22  0508 07/23/22  0608   WBC Thousand/uL 7 91 4 38   HEMOGLOBIN g/dL 9 7* 9 4*   HEMATOCRIT % 30 7* 30 7*   PLATELETS Thousands/uL 395* 369   NEUTROS PCT %  --  66   LYMPHS PCT %  --  20   MONOS PCT %  --  10   EOS PCT %  --  2     Results from last 7 days   Lab Units 07/27/22  0508 07/23/22  0608 07/21/22 2020   SODIUM mmol/L 134*   < > 140   POTASSIUM mmol/L 3 6   < > 3 3*   CHLORIDE mmol/L 102   < > 108   CO2 mmol/L 29   < > 25   BUN mg/dL 11   < > 8   CREATININE mg/dL 0 37*   < > 0 45*   ANION GAP mmol/L 3*   < > 7   CALCIUM mg/dL 8 4   < > 8 8   ALBUMIN g/dL  --   --  2 8*   TOTAL BILIRUBIN mg/dL  --   --  0 37   ALK PHOS U/L  --   --  85   ALT U/L  --   --  25   AST U/L  --   --  34   GLUCOSE RANDOM mg/dL 81   < > 83    < > = values in this interval not displayed  * I Have Reviewed All Lab Data Listed Above  * Additional Pertinent Lab Tests Reviewed:  All Labs Within Last 24 Hours Reviewed    Imaging:    Imaging Reports Reviewed Today Include:   Imaging Personally Reviewed by Myself Includes:      Recent Cultures (last 7 days):     Results from last 7 days   Lab Units 07/24/22  0839 07/21/22 1950   GRAM STAIN RESULT  No Polys  No organisms seen Rare Gram positive cocci in pairs*  No polys seen*   WOUND CULTURE   --  2+ Growth of Staphylococcus aureus*  1+ Growth of Pseudomonas aeruginosa*  1+ Growth of - Acinetobacter pittii  / Acinetobacter species*       Last 24 Hours Medication List:   Current Facility-Administered Medications   Medication Dose Route Frequency Provider Last Rate    acetaminophen  975 mg Oral Q8H [facility] Alesia Gordillo PA-C      Ascorbic Acid (Vitamin C)  375 mg Oral Daily Saul Mullen DPM      fentaNYL  25 mcg Intravenous Q3 min PRN Leonor Tavera CRNA      ferrous gluconate  324 mg Oral Daily Saul Mullen DPM      folic acid  1,241 mcg Oral Daily Saul Mullen DPM      heparin (porcine)  5,000 Units Subcutaneous [facility] Saul Mullen DPM  hydroxychloroquine  200 mg Oral Daily With Breakfast Didier [place], DPM      hydrOXYzine HCL  25 mg Oral Q6H PRN Larena Zaida, DPM      levothyroxine  25 mcg Oral Daily Larena Zaida, DPM      melatonin  6 mg Oral HS Larena [place], DPM      meloxicam  7 5 mg Oral Daily Larena [place], DPM      methocarbamol  750 mg Oral Q6H PRN Larena [place], DPM      methotrexate  2 5 mg Oral Weekly Larena [place], DPM      morphine injection  2 mg Intravenous Q4H PRN Isaiah Gordillo PA-C      multivitamin-minerals  1 tablet Oral Daily Larena Zaida, DPM      naloxone  0 04 mg Intravenous Q1MIN PRN Larena Zaida, DPM      nortriptyline  100 mg Oral HS Larena Zaida, DPM      ondansetron  4 mg Intravenous Q4H PRN Larena [place], DPM      oxyCODONE  10 mg Oral Q4H PRN Alona Calix MD      oxyCODONE  5 mg Oral Q4H PRN Alesia Gordillo PA-C      polyethylene glycol  17 g Oral Daily PRN Larena [place], DPM      predniSONE  5 mg Oral Daily Larena Zaida, DPM      rOPINIRole  2 mg Oral HS Larena [place], DPM      senna-docusate sodium  1 tablet Oral BID Didier Cerda, KILO          Today, Patient Was Seen By: Kenton Loving MD    ** Please Note: Dictation voice to text software may have been used in the creation of this document   **

## 2022-07-28 NOTE — DISCHARGE INSTRUCTIONS
Discharge Instructions - Podiatry    Weight Bearing Status: Non-weightbearing to left foot  Pain: Continue analgesics as directed    Follow-up appointment instructions: Please make an appointment within 2-3 days of discharge with Dr Morelos Achilles  Contact sooner if any increase in pain, or signs of infection occur    Nursing Wound Care Instructions: Apply adaptic to the incision site, cover with 4x4, kerlix and a lightly wrapped ACE  Leave dressings dry and intact between dressing changes   Change dressings F

## 2022-07-28 NOTE — PROGRESS NOTES
Podiatry - Progress Note  Patient: Dulce Maria Farfan Colon 65 y o  female   MRN: [id number]  PCP: Jaden Mendez MD  Unit/Bed#: MS 62557 Encounter: [id number]  Date Of Visit: 22    ASSESSMENT:    Bernardo Irizarry is a 65 y o  female with:    1  Left Plantar 5th metatarsal head ulceration with underlying osteomyleitis- Hernandez 3-POA  - S/p left partial 5th ray resection with flap closure (DOS 22)  2  Left leg venous stasis ulcer-POA  3  Chronic venous hypertension  4  Rheumatoid Arthritis   5  Hypothyroidism due to Hashimoto's disease  6  Restless Leg Syndrome    PLAN:    · Plan to continue monitoring incision sites  Still showing very mild cloudy discharge from drain incision, but plantar incision is stable  · Dressings applied today: adaptic, 4x4 gauze, ABD, kerlix, ACE  ·  Elevation and offloading on green foam wedges or pillows when non-ambulatory  Weightbearing status: Non-weightbearing left  foot    SUBJECTIVE:     The patient was seen, evaluated, and assessed at bedside today  The patient was awake, alert, and in no acute distress  No acute events overnight  The patient reports pain on dressing change  Patient denies N/V/F/chills/SOB/CP  OBJECTIVE:     Vitals:   /84   Pulse 78   Temp 97 6 °F (36 4 °C)   Resp 18   Ht 4' 10" (1 473 m)   Wt 45 4 kg (100 lb)   SpO2 98%   BMI 20 90 kg/m²     Temp (24hrs), Av 9 °F (36 6 °C), Min:97 6 °F (36 4 °C), Max:98 2 °F (36 8 °C)      Physical Exam:     General Appearance: Alert, cooperative, no distress  Lower Extremity:    Vascular:   DP: Right: 2+ Left: 2+  PT: Right: 2+ Left: 2+  CRT < 3 seconds at the digits  CRT <3 seconds to flap  No edema noted to b/l lower extremities  Pedal hair is absent bilaterally  Skin temperature is WNL bilaterally      Musculoskeletal:  MMT is 5/5 in all muscle compartments bilaterally  Pain on palpation of left foot     No gross deformities noted       Dermatological:  All sutures intact, incision well coapted  No edema or erythema noted  No discharge noted to plantar incision, mild cloudy discharge noted to dorsal drain incision       Neurological:  Gross sensation is intact  Light touch is intact  Proprioception is intact  Clinical Images 07/28/22: Additional Data:     Labs:    Results from last 7 days   Lab Units 07/27/22  0508 07/23/22  0608   WBC Thousand/uL 7 91 4 38   HEMOGLOBIN g/dL 9 7* 9 4*   HEMATOCRIT % 30 7* 30 7*   PLATELETS Thousands/uL 395* 369   NEUTROS PCT %  --  66   LYMPHS PCT %  --  20   MONOS PCT %  --  10   EOS PCT %  --  2     Results from last 7 days   Lab Units 07/27/22  0508 07/23/22  0608 07/21/22  2020   POTASSIUM mmol/L 3 6   < > 3 3*   CHLORIDE mmol/L 102   < > 108   CO2 mmol/L 29   < > 25   BUN mg/dL 11   < > 8   CREATININE mg/dL 0 37*   < > 0 45*   CALCIUM mg/dL 8 4   < > 8 8   ALK PHOS U/L  --   --  85   ALT U/L  --   --  25   AST U/L  --   --  34    < > = values in this interval not displayed  * I Have Reviewed All Lab Data Listed Above  Recent Cultures (last 7 days):     Results from last 7 days   Lab Units 07/24/22  0839 07/21/22  1950   GRAM STAIN RESULT  No Polys  No organisms seen Rare Gram positive cocci in pairs*  No polys seen*   WOUND CULTURE   --  2+ Growth of Staphylococcus aureus*  1+ Growth of Pseudomonas aeruginosa*  1+ Growth of - Acinetobacter pittii  / Acinetobacter species*     Results from last 7 days   Lab Units 07/24/22  0839   ANAEROBIC CULTURE  No anaerobes isolated       Imaging: I have personally reviewed pertinent films in PACS  EKG, Pathology, and Other Studies: I have personally reviewed pertinent reports  ** Please Note: Portions of the record may have been created with voice recognition software  Occasional wrong word or "sound a like" substitutions may have occurred due to the inherent limitations of voice recognition software   Read the chart carefully and recognize, using context, where substitutions have occurred   **

## 2022-07-29 PROBLEM — M86.9 OSTEOMYELITIS (HCC): Status: RESOLVED | Noted: 2022-07-21 | Resolved: 2022-07-29

## 2022-07-29 PROBLEM — E87.1 HYPONATREMIA: Status: RESOLVED | Noted: 2022-07-25 | Resolved: 2022-07-29

## 2022-07-29 LAB
ALBUMIN SERPL BCP-MCNC: 2.6 G/DL (ref 3.5–5)
ALP SERPL-CCNC: 94 U/L (ref 46–116)
ALT SERPL W P-5'-P-CCNC: 20 U/L (ref 12–78)
ANION GAP SERPL CALCULATED.3IONS-SCNC: 3 MMOL/L (ref 4–13)
AST SERPL W P-5'-P-CCNC: 25 U/L (ref 5–45)
BASOPHILS # BLD AUTO: 0.02 THOUSANDS/ΜL (ref 0–0.1)
BASOPHILS NFR BLD AUTO: 0 % (ref 0–1)
BILIRUB SERPL-MCNC: 0.29 MG/DL (ref 0.2–1)
BUN SERPL-MCNC: 11 MG/DL (ref 5–25)
CALCIUM ALBUM COR SERPL-MCNC: 9.6 MG/DL (ref 8.3–10.1)
CALCIUM SERPL-MCNC: 8.5 MG/DL (ref 8.3–10.1)
CHLORIDE SERPL-SCNC: 104 MMOL/L (ref 96–108)
CO2 SERPL-SCNC: 29 MMOL/L (ref 21–32)
CREAT SERPL-MCNC: 0.4 MG/DL (ref 0.6–1.3)
EOSINOPHIL # BLD AUTO: 0.28 THOUSAND/ΜL (ref 0–0.61)
EOSINOPHIL NFR BLD AUTO: 4 % (ref 0–6)
ERYTHROCYTE [DISTWIDTH] IN BLOOD BY AUTOMATED COUNT: 18 % (ref 11.6–15.1)
GFR SERPL CREATININE-BSD FRML MDRD: 100 ML/MIN/1.73SQ M
GLUCOSE SERPL-MCNC: 88 MG/DL (ref 65–140)
HCT VFR BLD AUTO: 35 % (ref 34.8–46.1)
HGB BLD-MCNC: 10.7 G/DL (ref 11.5–15.4)
IMM GRANULOCYTES # BLD AUTO: 0.14 THOUSAND/UL (ref 0–0.2)
IMM GRANULOCYTES NFR BLD AUTO: 2 % (ref 0–2)
LYMPHOCYTES # BLD AUTO: 1.5 THOUSANDS/ΜL (ref 0.6–4.47)
LYMPHOCYTES NFR BLD AUTO: 20 % (ref 14–44)
MAGNESIUM SERPL-MCNC: 2.2 MG/DL (ref 1.6–2.6)
MCH RBC QN AUTO: 27.9 PG (ref 26.8–34.3)
MCHC RBC AUTO-ENTMCNC: 30.6 G/DL (ref 31.4–37.4)
MCV RBC AUTO: 91 FL (ref 82–98)
MONOCYTES # BLD AUTO: 1.02 THOUSAND/ΜL (ref 0.17–1.22)
MONOCYTES NFR BLD AUTO: 13 % (ref 4–12)
NEUTROPHILS # BLD AUTO: 4.68 THOUSANDS/ΜL (ref 1.85–7.62)
NEUTS SEG NFR BLD AUTO: 61 % (ref 43–75)
NRBC BLD AUTO-RTO: 0 /100 WBCS
PHOSPHATE SERPL-MCNC: 3.4 MG/DL (ref 2.3–4.1)
PLATELET # BLD AUTO: 451 THOUSANDS/UL (ref 149–390)
PMV BLD AUTO: 8.7 FL (ref 8.9–12.7)
POTASSIUM SERPL-SCNC: 4 MMOL/L (ref 3.5–5.3)
PROT SERPL-MCNC: 6.6 G/DL (ref 6.4–8.4)
RBC # BLD AUTO: 3.83 MILLION/UL (ref 3.81–5.12)
SODIUM SERPL-SCNC: 136 MMOL/L (ref 135–147)
WBC # BLD AUTO: 7.64 THOUSAND/UL (ref 4.31–10.16)

## 2022-07-29 PROCEDURE — 85025 COMPLETE CBC W/AUTO DIFF WBC: CPT | Performed by: INTERNAL MEDICINE

## 2022-07-29 PROCEDURE — 99232 SBSQ HOSP IP/OBS MODERATE 35: CPT | Performed by: PHYSICIAN ASSISTANT

## 2022-07-29 PROCEDURE — 83735 ASSAY OF MAGNESIUM: CPT | Performed by: INTERNAL MEDICINE

## 2022-07-29 PROCEDURE — 84100 ASSAY OF PHOSPHORUS: CPT | Performed by: INTERNAL MEDICINE

## 2022-07-29 PROCEDURE — 80053 COMPREHEN METABOLIC PANEL: CPT | Performed by: INTERNAL MEDICINE

## 2022-07-29 RX ADMIN — OXYCODONE HYDROCHLORIDE 10 MG: 10 TABLET ORAL at 21:29

## 2022-07-29 RX ADMIN — ROPINIROLE HYDROCHLORIDE 2 MG: 1 TABLET, FILM COATED ORAL at 21:29

## 2022-07-29 RX ADMIN — FOLIC ACID 2000 MCG: 1 TABLET ORAL at 09:02

## 2022-07-29 RX ADMIN — HEPARIN SODIUM 5000 UNITS: 5000 INJECTION INTRAVENOUS; SUBCUTANEOUS at 21:30

## 2022-07-29 RX ADMIN — ACETAMINOPHEN 975 MG: 325 TABLET, FILM COATED ORAL at 05:59

## 2022-07-29 RX ADMIN — OXYCODONE HYDROCHLORIDE 10 MG: 10 TABLET ORAL at 06:00

## 2022-07-29 RX ADMIN — MELATONIN 6 MG: at 21:29

## 2022-07-29 RX ADMIN — LEVOTHYROXINE SODIUM 25 MCG: 25 TABLET ORAL at 09:03

## 2022-07-29 RX ADMIN — Medication 375 MG: at 09:02

## 2022-07-29 RX ADMIN — HYDROXYCHLOROQUINE SULFATE 200 MG: 200 TABLET ORAL at 09:03

## 2022-07-29 RX ADMIN — MULTIPLE VITAMINS W/ MINERALS TAB 1 TABLET: TAB ORAL at 09:02

## 2022-07-29 RX ADMIN — METHOCARBAMOL TABLETS 750 MG: 750 TABLET, COATED ORAL at 13:12

## 2022-07-29 RX ADMIN — FERROUS GLUCONATE 324 MG: 324 TABLET ORAL at 09:03

## 2022-07-29 RX ADMIN — PREDNISONE 5 MG: 5 TABLET ORAL at 09:03

## 2022-07-29 RX ADMIN — SENNOSIDES AND DOCUSATE SODIUM 1 TABLET: 8.6; 5 TABLET ORAL at 09:02

## 2022-07-29 RX ADMIN — MELOXICAM 7.5 MG: 7.5 TABLET ORAL at 09:03

## 2022-07-29 RX ADMIN — HEPARIN SODIUM 5000 UNITS: 5000 INJECTION INTRAVENOUS; SUBCUTANEOUS at 06:00

## 2022-07-29 RX ADMIN — NORTRIPTYLINE HYDROCHLORIDE 100 MG: 50 CAPSULE ORAL at 21:31

## 2022-07-29 RX ADMIN — METHOCARBAMOL TABLETS 750 MG: 750 TABLET, COATED ORAL at 05:59

## 2022-07-29 NOTE — PLAN OF CARE
Problem: SAFETY ADULT  Goal: Patient will remain free of falls  Description: INTERVENTIONS:  - Educate patient/family on patient safety including physical limitations  - Instruct patient to call for assistance with activity   - Consult OT/PT to assist with strengthening/mobility   - Keep Call bell within reach  - Keep bed low and locked with side rails adjusted as appropriate  - Keep care items and personal belongings within reach  - Initiate and maintain comfort rounds  - Make Fall Risk Sign visible to staff  - Offer Toileting every 2 Hours, in advance of need  - Initiate/Maintain bed alarm  - Obtain necessary fall risk management equipment:   - Apply yellow socks and bracelet for high fall risk patients  - Consider moving patient to room near nurses station  Outcome: Progressing

## 2022-07-29 NOTE — ASSESSMENT & PLAN NOTE
· Mild, was suspected due to dehydration in the setting of NPO for surgery   · Hold IV fluids  · Encourage PO intake   · Sodium has normalized today 136

## 2022-07-29 NOTE — PROGRESS NOTES
[facility]  Progress Note Mariajose Santos Colon 1945, 65 y o  female MRN: [id number]  Unit/Bed#: MS Ariza5-Steven Encounter: [id number]  Primary Care Provider: Aziza Miller MD   Date and time admitted to hospital: 7/21/2022  5:59 PM    * Osteomyelitis [facility])  Assessment & Plan  · Presented to the ED with increased L foot pain  Patient with left plantar foot ulceration, admitted to Podiatry service  · XR concerning for bone involvement   · Local wound care per primary   · S/p OR resection of 5th Metatarsal head 7/24  · Antibiotics were discontinued post-op  · Pain control- regimen was adjusted yesterday with improvement   · PT/OT recommending rehab- patient is agreeable    Hyponatremia  Assessment & Plan  · Mild, was suspected due to dehydration in the setting of NPO for surgery   · Hold IV fluids  · Encourage PO intake   · Sodium has normalized today 136   · Recommend outpatient BMP in 1 week to make sure sodium remains stable     Rheumatoid arthritis involving multiple sites with positive rheumatoid factor (HCC)  Assessment & Plan  · Continue prednisone 5 mg daily and methotrexate per PTA medication regimen     Hypothyroidism due to Hashimoto's thyroiditis  Assessment & Plan  · Continue synthroid therapy     Anxiety  Assessment & Plan  · Mood appears stable  · Would avoid benzos if possible         VTE Pharmacologic Prophylaxis: VTE Score: 6 High Risk (Score >/= 5) - Pharmacological DVT Prophylaxis Ordered: heparin  Sequential Compression Devices Ordered  Patient Centered Rounds: I performed bedside rounds with nursing staff today  Discussions with Specialists or Other Care Team Provider: podiatry    Education and Discussions with Family / Patient: patient     Time Spent for Care: 30 minutes  More than 50% of total time spent on counseling and coordination of care as described above      Current Length of Stay: 8 day(s)  Current Patient Status: Inpatient Certification Statement: The patient will continue to require additional inpatient hospital stay due to pending STR placement   Discharge Plan: [facility] is following this patient on consult  They are medically stable for discharge when deemed appropriate by primary service  Code Status: Level 1 - Full Code    Subjective:   Patient was resting in bed comfortably with no complaints at this time  She reports improvement in pain after adjustments were made to her regimen yesterday  She denies any cp, sob, n/v/d, abd pain  Objective:     Vitals:   Temp (24hrs), Av °F (36 7 °C), Min:97 9 °F (36 6 °C), Max:98 1 °F (36 7 °C)    Temp:  [97 9 °F (36 6 °C)-98 1 °F (36 7 °C)] 98 1 °F (36 7 °C)  HR:  [78-81] 79  Resp:  [16] 16  BP: (108-156)/(55-80) 108/55  SpO2:  [94 %-98 %] 94 %  Body mass index is 20 9 kg/m²  Input and Output Summary (last 24 hours): Intake/Output Summary (Last 24 hours) at 2022 0908  Last data filed at 2022 0700  Gross per 24 hour   Intake 380 ml   Output 2800 ml   Net -2420 ml       Physical Exam:   Physical Exam  Constitutional:       General: She is not in acute distress  HENT:      Mouth/Throat:      Mouth: Mucous membranes are moist    Eyes:      General: No scleral icterus  Cardiovascular:      Rate and Rhythm: Normal rate and regular rhythm  Heart sounds: Normal heart sounds  Pulmonary:      Breath sounds: Normal breath sounds  Abdominal:      General: Abdomen is flat  Bowel sounds are normal       Palpations: Abdomen is soft  Musculoskeletal:      Right lower leg: No edema  Left lower leg: No edema  Skin:     General: Skin is warm  Neurological:      Mental Status: She is alert and oriented to person, place, and time     Psychiatric:         Mood and Affect: Mood normal           Additional Data:     Labs:  Results from last 7 days   Lab Units 22  0452   WBC Thousand/uL 7 64   HEMOGLOBIN g/dL 10 7*   HEMATOCRIT % 35 0   PLATELETS Thousands/uL 451* NEUTROS PCT % 61   LYMPHS PCT % 20   MONOS PCT % 13*   EOS PCT % 4     Results from last 7 days   Lab Units 07/29/22  0452   SODIUM mmol/L 136   POTASSIUM mmol/L 4 0   CHLORIDE mmol/L 104   CO2 mmol/L 29   BUN mg/dL 11   CREATININE mg/dL 0 40*   ANION GAP mmol/L 3*   CALCIUM mg/dL 8 5   ALBUMIN g/dL 2 6*   TOTAL BILIRUBIN mg/dL 0 29   ALK PHOS U/L 94   ALT U/L 20   AST U/L 25   GLUCOSE RANDOM mg/dL 88                       Lines/Drains:  Invasive Devices  Report    Peripheral Intravenous Line  Duration           Peripheral IV 07/28/22 Right;Ventral (anterior) Forearm 1 day          Drain  Duration           Closed/Suction Drain Left;Posterior; Lateral Foot Bulb 10 Fr  5 days                      Imaging: No pertinent imaging reviewed      Recent Cultures (last 7 days):   Results from last 7 days   Lab Units 07/24/22  0839   GRAM STAIN RESULT  No Polys  No organisms seen       Last 24 Hours Medication List:   Current Facility-Administered Medications   Medication Dose Route Frequency Provider Last Rate    acetaminophen  975 mg Oral Q8H [address] Alesia LITO Gordillo PA-C      Ascorbic Acid (Vitamin C)  375 mg Oral Daily Yani Matters, DPM      fentaNYL  25 mcg Intravenous Q3 min PRN Leonor Tavera CRNA      ferrous gluconate  324 mg Oral Daily Yani Matters, DPM      folic acid  0,296 mcg Oral Daily Yani Matters, DPM      heparin (porcine)  5,000 Units Subcutaneous [facility] Yani Matters, DPM      hydroxychloroquine  200 mg Oral Daily With Breakfast Yani Matters, DPM      hydrOXYzine HCL  25 mg Oral Q6H PRN Yani Matters, DPM      levothyroxine  25 mcg Oral Daily Yani Matters, DPM      melatonin  6 mg Oral HS Yani Matters, DPM      meloxicam  7 5 mg Oral Daily Yani Matters, DPM      methocarbamol  750 mg Oral Q6H PRN Yani Matters, DPM      methotrexate  2 5 mg Oral Weekly Yani Matters, DPM      morphine injection  2 mg Intravenous Q4H PRN Alesia Gordillo PA-C      multivitamin-minerals  1 tablet Oral Daily Lisa Fernandez DPM      naloxone  0 04 mg Intravenous Q1MIN PRN Lisa Fernandez DPM      nortriptyline  100 mg Oral HS Lisa Fernandez DPM      ondansetron  4 mg Intravenous Q4H PRN Lisa Fernandez DPM      oxyCODONE  10 mg Oral Q4H PRN Raffy Dotson MD      oxyCODONE  5 mg Oral Q4H PRN Alesia Gordillo PA-C      polyethylene glycol  17 g Oral Daily PRN Lisa Fernandez DPM      predniSONE  5 mg Oral Daily Lisa Fernandez DPM      rOPINIRole  2 mg Oral HS Lisa Fernandze DPM      senna-docusate sodium  1 tablet Oral BID Lisa Fernandez DPM          Today, Patient Was Seen By: Humphrey Lau PA-C    **Please Note: This note may have been constructed using a voice recognition system  **

## 2022-07-29 NOTE — PROGRESS NOTES
07/29/22 0700   Clinical Encounter Type   Visited With Patient   Routine Visit Introduction   Sacramental Encounters   Sacrament Other   ([place] by Fr Seferino Lees)

## 2022-07-29 NOTE — ASSESSMENT & PLAN NOTE
· Mild, was suspected due to dehydration in the setting of NPO for surgery   · Hold IV fluids  · Encourage PO intake   · Sodium has normalized last 136   · Recommend outpatient BMP in 1 week to make sure sodium remains stable

## 2022-07-29 NOTE — ASSESSMENT & PLAN NOTE
· Presented to the ED with increased L foot pain  Patient with left plantar foot ulceration, admitted to Podiatry service    · XR concerning for bone involvement   · Local wound care per primary   · S/p OR resection of 5th Metatarsal head 7/24  · Antibiotics were discontinued post-op  · Pain control- regimen was adjusted yesterday with improvement   · PT/OT recommending rehab- patient is agreeable

## 2022-07-30 PROCEDURE — NC001 PR NO CHARGE: Performed by: PODIATRIST

## 2022-07-30 RX ADMIN — OXYCODONE HYDROCHLORIDE 10 MG: 10 TABLET ORAL at 13:32

## 2022-07-30 RX ADMIN — LEVOTHYROXINE SODIUM 25 MCG: 25 TABLET ORAL at 08:28

## 2022-07-30 RX ADMIN — ROPINIROLE HYDROCHLORIDE 2 MG: 1 TABLET, FILM COATED ORAL at 21:52

## 2022-07-30 RX ADMIN — HYDROXYZINE HYDROCHLORIDE 25 MG: 25 TABLET ORAL at 20:38

## 2022-07-30 RX ADMIN — MULTIPLE VITAMINS W/ MINERALS TAB 1 TABLET: TAB ORAL at 08:28

## 2022-07-30 RX ADMIN — FERROUS GLUCONATE 324 MG: 324 TABLET ORAL at 08:29

## 2022-07-30 RX ADMIN — Medication 375 MG: at 08:30

## 2022-07-30 RX ADMIN — OXYCODONE HYDROCHLORIDE 10 MG: 10 TABLET ORAL at 21:53

## 2022-07-30 RX ADMIN — ACETAMINOPHEN 975 MG: 325 TABLET, FILM COATED ORAL at 05:59

## 2022-07-30 RX ADMIN — METHOCARBAMOL TABLETS 750 MG: 750 TABLET, COATED ORAL at 06:02

## 2022-07-30 RX ADMIN — MELATONIN 6 MG: at 21:52

## 2022-07-30 RX ADMIN — HYDROXYCHLOROQUINE SULFATE 200 MG: 200 TABLET ORAL at 08:29

## 2022-07-30 RX ADMIN — METHOCARBAMOL TABLETS 750 MG: 750 TABLET, COATED ORAL at 13:32

## 2022-07-30 RX ADMIN — SENNOSIDES AND DOCUSATE SODIUM 1 TABLET: 8.6; 5 TABLET ORAL at 08:29

## 2022-07-30 RX ADMIN — HEPARIN SODIUM 5000 UNITS: 5000 INJECTION INTRAVENOUS; SUBCUTANEOUS at 06:00

## 2022-07-30 RX ADMIN — HEPARIN SODIUM 5000 UNITS: 5000 INJECTION INTRAVENOUS; SUBCUTANEOUS at 13:32

## 2022-07-30 RX ADMIN — ACETAMINOPHEN 975 MG: 325 TABLET, FILM COATED ORAL at 13:32

## 2022-07-30 RX ADMIN — MELOXICAM 7.5 MG: 7.5 TABLET ORAL at 08:29

## 2022-07-30 RX ADMIN — FOLIC ACID 2000 MCG: 1 TABLET ORAL at 08:28

## 2022-07-30 RX ADMIN — HEPARIN SODIUM 5000 UNITS: 5000 INJECTION INTRAVENOUS; SUBCUTANEOUS at 22:03

## 2022-07-30 RX ADMIN — OXYCODONE HYDROCHLORIDE 10 MG: 10 TABLET ORAL at 06:00

## 2022-07-30 RX ADMIN — PREDNISONE 5 MG: 5 TABLET ORAL at 08:28

## 2022-07-30 RX ADMIN — NORTRIPTYLINE HYDROCHLORIDE 100 MG: 50 CAPSULE ORAL at 21:51

## 2022-07-30 NOTE — PROGRESS NOTES
Podiatry - Progress Note  Patient: Yahir Perez Colon 65 y o  female   MRN: [id number]  PCP: Tonny Lynch MD  Unit/Bed#: -02 Encounter: [id number]  Date Of Visit: 22    ASSESSMENT:    Óscar Osei is a 65 y o  female with:    1  Left plantar 5th metatarsal head ulceration with underlying osteomyelitis-Hernandez 3-POA  a  S/p left partial 5th ray resection with flap closure DOS: 2022  2  Left leg venous stasis ulcer-POA  3  Chronic venous hypertension  4  Rheumatoid arthritis  5  Hypothyroidism due to Hashimoto's disease  6  Restless legs syndrome    PLAN:    · Surgical incision site evaluated today with dressing change, appears stable at this time without any clinical signs of infection  Skin edges well coapted, all sutures intact  · Patient stable for discharge at this time, and outpatient follow-up  · Will begin discharge paperwork, and consult case management for possible rehab placement  · Appreciate recommendations of consulting services  · Continue local wound care, Adaptic, 4 x 4 gauze, Kerlix, 4 in ACE  · Elevation and offloading on green foam wedges or pillows when non-ambulatory  Weightbearing status: Non-weightbearing left  foot    SUBJECTIVE:     The patient was seen, evaluated, and assessed at bedside today  The patient was awake, alert, and in no acute distress  No acute events overnight  The patient reports feeling well today without pain to left lower extremity  Patient denies N/V/F/chills/SOB/CP  OBJECTIVE:     Vitals:   /53   Pulse 81   Temp 97 7 °F (36 5 °C)   Resp 16   Ht 4' 10" (1 473 m)   Wt 44 6 kg (98 lb 5 2 oz)   SpO2 96%   BMI 20 55 kg/m²     Temp (24hrs), Av 7 °F (36 5 °C), Min:97 3 °F (36 3 °C), Max:98 2 °F (36 8 °C)      Physical Exam:     Lower Extremity:  Vascular:   DP: Right: 2+ Left: 2+  PT: Right: 2+ Left: 2+  CRT < 3 seconds at the digits    CRT <3 seconds to flap  No edema noted to b/l lower extremities      Skin temperature is WNL bilaterally      Musculoskeletal:  Motor function to all digits intact  Pain on palpation of left foot  No pain on palpation of the calf, or with dorsiflexion at the ankle joint      Dermatological:  Skin edges well coapted, with all sutures intact  No erythema or edema to the left foot, and no drainage present from surgical site  Skin on plantar flap appears pink and viable, with no signs of necrosis  Neurological:  Gross sensation is intact  Light touch is intact  Proprioception is intact  Clinical Images 07/30/22: Additional Data:     Labs:    Results from last 7 days   Lab Units 07/29/22  0452   WBC Thousand/uL 7 64   HEMOGLOBIN g/dL 10 7*   HEMATOCRIT % 35 0   PLATELETS Thousands/uL 451*   NEUTROS PCT % 61   LYMPHS PCT % 20   MONOS PCT % 13*   EOS PCT % 4     Results from last 7 days   Lab Units 07/29/22  0452   POTASSIUM mmol/L 4 0   CHLORIDE mmol/L 104   CO2 mmol/L 29   BUN mg/dL 11   CREATININE mg/dL 0 40*   CALCIUM mg/dL 8 5   ALK PHOS U/L 94   ALT U/L 20   AST U/L 25           * I Have Reviewed All Lab Data Listed Above  Recent Cultures (last 7 days):     Results from last 7 days   Lab Units 07/24/22  0839   GRAM STAIN RESULT  No Polys  No organisms seen     Results from last 7 days   Lab Units 07/24/22  0839   ANAEROBIC CULTURE  No anaerobes isolated       Imaging: I have personally reviewed pertinent films in PACS  EKG, Pathology, and Other Studies: I have personally reviewed pertinent reports  ** Please Note: Portions of the record may have been created with voice recognition software  Occasional wrong word or "sound a like" substitutions may have occurred due to the inherent limitations of voice recognition software  Read the chart carefully and recognize, using context, where substitutions have occurred   **

## 2022-07-30 NOTE — PLAN OF CARE
Problem: PAIN - ADULT  Goal: Verbalizes/displays adequate comfort level or baseline comfort level  Description: Interventions:  - Encourage patient to monitor pain and request assistance  - Assess pain using appropriate pain scale  - Administer analgesics based on type and severity of pain and evaluate response  - Implement non-pharmacological measures as appropriate and evaluate response  - Consider cultural and social influences on pain and pain management  - Notify physician/advanced practitioner if interventions unsuccessful or patient reports new pain  Outcome: Progressing     Problem: INFECTION - ADULT  Goal: Absence or prevention of progression during hospitalization  Description: INTERVENTIONS:  - Assess and monitor for signs and symptoms of infection  - Monitor lab/diagnostic results  - Monitor all insertion sites, i e  indwelling lines, tubes, and drains  - Monitor endotracheal if appropriate and nasal secretions for changes in amount and color  - [place] appropriate cooling/warming therapies per order  - Administer medications as ordered  - Instruct and encourage patient and family to use good hand hygiene technique  - Identify and instruct in appropriate isolation precautions for identified infection/condition  Outcome: Progressing

## 2022-07-31 PROCEDURE — 99232 SBSQ HOSP IP/OBS MODERATE 35: CPT | Performed by: NURSE PRACTITIONER

## 2022-07-31 RX ADMIN — METHOCARBAMOL TABLETS 750 MG: 750 TABLET, COATED ORAL at 09:01

## 2022-07-31 RX ADMIN — OXYCODONE HYDROCHLORIDE 10 MG: 10 TABLET ORAL at 09:04

## 2022-07-31 RX ADMIN — OXYCODONE HYDROCHLORIDE 10 MG: 10 TABLET ORAL at 18:09

## 2022-07-31 RX ADMIN — METHOCARBAMOL TABLETS 750 MG: 750 TABLET, COATED ORAL at 18:10

## 2022-07-31 RX ADMIN — HYDROXYCHLOROQUINE SULFATE 200 MG: 200 TABLET ORAL at 09:08

## 2022-07-31 RX ADMIN — MELOXICAM 7.5 MG: 7.5 TABLET ORAL at 09:01

## 2022-07-31 RX ADMIN — ACETAMINOPHEN 975 MG: 325 TABLET, FILM COATED ORAL at 06:25

## 2022-07-31 RX ADMIN — ACETAMINOPHEN 975 MG: 325 TABLET, FILM COATED ORAL at 22:05

## 2022-07-31 RX ADMIN — HEPARIN SODIUM 5000 UNITS: 5000 INJECTION INTRAVENOUS; SUBCUTANEOUS at 06:25

## 2022-07-31 RX ADMIN — MELATONIN 6 MG: at 22:04

## 2022-07-31 RX ADMIN — HEPARIN SODIUM 5000 UNITS: 5000 INJECTION INTRAVENOUS; SUBCUTANEOUS at 22:01

## 2022-07-31 RX ADMIN — NORTRIPTYLINE HYDROCHLORIDE 100 MG: 50 CAPSULE ORAL at 22:05

## 2022-07-31 RX ADMIN — MULTIPLE VITAMINS W/ MINERALS TAB 1 TABLET: TAB ORAL at 09:04

## 2022-07-31 RX ADMIN — FERROUS GLUCONATE 324 MG: 324 TABLET ORAL at 09:00

## 2022-07-31 RX ADMIN — HEPARIN SODIUM 5000 UNITS: 5000 INJECTION INTRAVENOUS; SUBCUTANEOUS at 15:00

## 2022-07-31 RX ADMIN — LEVOTHYROXINE SODIUM 25 MCG: 25 TABLET ORAL at 09:06

## 2022-07-31 RX ADMIN — Medication 375 MG: at 09:00

## 2022-07-31 RX ADMIN — OXYCODONE HYDROCHLORIDE 10 MG: 10 TABLET ORAL at 22:13

## 2022-07-31 RX ADMIN — ROPINIROLE HYDROCHLORIDE 2 MG: 1 TABLET, FILM COATED ORAL at 22:04

## 2022-07-31 RX ADMIN — PREDNISONE 5 MG: 5 TABLET ORAL at 09:05

## 2022-07-31 RX ADMIN — FOLIC ACID 2000 MCG: 1 TABLET ORAL at 09:04

## 2022-07-31 NOTE — CASE MANAGEMENT
Case Management Discharge Planning Note    Patient name Kemal Ly  Location /-01 MRN [id number]  : 1945 Date 2022       Current Admission Date: 2022  Current Admission Diagnosis:Rheumatoid arthritis involving multiple sites with positive rheumatoid factor [facility])   Patient Active Problem List    Diagnosis Date Noted    Venous insufficiency 2021    Shoulder pain, bilateral 2021    Failed orthopedic implant ([facility] [address] ) 2021    Encounter for annual wellness exam in Medicare patient 2021    S/P reverse total shoulder arthroplasty, left 2021    Rupture long head biceps tendon, left, initial encounter 2021    Acquired subluxation of left shoulder 2021    Depression 2020    Other forms of [facility] 2020    Dyspepsia 2019    Vitamin D deficiency 2019    Chronic pain syndrome 2019    Rheumatoid arthritis involving multiple sites with positive rheumatoid factor ([facility] [address] ) 2019    Ambulatory dysfunction 2018    Closed compression fracture of L3 lumbar vertebra 2018    S/P total hip arthroplasty 2017    Anxiety 2017    RLS (restless legs syndrome) 2017    RBBB 2017    Age-related osteoporosis without current pathological fracture 2017    Hypothyroidism due to Hashimoto's thyroiditis 2016      LOS (days): 10  Geometric Mean LOS (GMLOS) (days): 7 60  Days to GMLOS:-2 1     OBJECTIVE:  Risk of Unplanned Readmission Score: 15 44         Current admission status: Inpatient   Preferred Pharmacy:   [address] [place] [address]  Phone: [phone] Fax: [phone]    Primary Care Provider: Filemon Drummond MD    Primary Insurance: MEDICARE  Secondary Insurance:     DISCHARGE DETAILS:    Other Referral/Resources/Interventions Provided:  Referral Comments: Family refusing [id number]  CM changed due date and time in [place] [address] notified[de-identified] Phone call to Castillo Mims pt niece [phone]  Left a message for return call  Phone call to Kimberly Barba pt niece [phone]  Franklin Hartman is POA refusing any [employer] facilites  Would prefer [place]   CM will have CM follow up tomorrow with responses

## 2022-07-31 NOTE — PROGRESS NOTES
[facility]  Progress Note Giancarlo Collins Colon 1945, 65 y o  female MRN: [id number]  Unit/Bed#: MS Ariza5-Steven Encounter: [id number]  Primary Care Provider: Tyler Cole MD   Date and time admitted to hospital: 7/21/2022  5:59 PM    * Osteomyelitis (HCC)-resolved as of 7/29/2022  Assessment & Plan  · Presented to the ED with increased L foot pain  Patient with left plantar foot ulceration, admitted to Podiatry service  · XR concerning for bone involvement   · Local wound care per primary   · S/p OR resection of 5th Metatarsal head 7/24  · Antibiotics were discontinued post-op  · Pain control- regimen was adjusted yesterday with improvement   · PT/OT recommending rehab- patient is agreeable    Rheumatoid arthritis involving multiple sites with positive rheumatoid factor (HCC)  Assessment & Plan  · Continue prednisone 5 mg daily and methotrexate per PTA medication regimen     Hypothyroidism due to Hashimoto's thyroiditis  Assessment & Plan  · Continue synthroid therapy     Anxiety  Assessment & Plan  · Mood appears stable  · Would avoid benzos if possible     Hyponatremia-resolved as of 7/29/2022  Assessment & Plan  · Mild, was suspected due to dehydration in the setting of NPO for surgery   · Hold IV fluids  · Encourage PO intake   · Sodium has normalized last 136   · Recommend outpatient BMP in 1 week to make sure sodium remains stable          VTE Pharmacologic Prophylaxis: VTE Score: 6 High Risk (Score >/= 5) - Pharmacological DVT Prophylaxis Ordered: heparin  Sequential Compression Devices Ordered  Patient Centered Rounds: I performed bedside rounds with nursing staff today  Discussions with Specialists or Other Care Team Provider: nursing     Education and Discussions with Family / Patient: patient   Time Spent for Care: 45 minutes  More than 50% of total time spent on counseling and coordination of care as described above      Current Length of Stay: 10 day(s)  Current Patient Status: Inpatient   Certification Statement: The patient will continue to require additional inpatient hospital stay due to pending rehab placement per primary team  I callled cm and discussed with them working with dc plan   Discharge Plan: Anticipate discharge in 24-48 hrs to rehab facility  Code Status: Level 1 - Full Code    Subjective:   Pt reports she feels well was sleeping did not realize that she had her lunch delivered we call rn to bedside to assist with feed per the pts request  Pt states she understands she is waiting for rehab and does have questions to ask podiatry  Objective:     Vitals:   Temp (24hrs), Av 7 °F (36 5 °C), Min:97 4 °F (36 3 °C), Max:97 9 °F (36 6 °C)    Temp:  [97 4 °F (36 3 °C)-97 9 °F (36 6 °C)] 97 9 °F (36 6 °C)  HR:  [77-87] 77  Resp:  [16] 16  BP: (128-130)/(63-65) 128/63  SpO2:  [94 %-96 %] 96 %  Body mass index is 20 55 kg/m²  Input and Output Summary (last 24 hours): Intake/Output Summary (Last 24 hours) at 2022 1117  Last data filed at 2022 0700  Gross per 24 hour   Intake 405 ml   Output --   Net 405 ml       Physical Exam:   Physical Exam  Constitutional:       General: She is not in acute distress  Appearance: She is not ill-appearing, toxic-appearing or diaphoretic  Comments: Cachectic    HENT:      Head: Normocephalic and atraumatic  Nose: No congestion  Eyes:      General:         Right eye: No discharge  Left eye: No discharge  Conjunctiva/sclera: Conjunctivae normal    Cardiovascular:      Rate and Rhythm: Normal rate  Heart sounds: No murmur heard  No friction rub  No gallop  Pulmonary:      Effort: No respiratory distress  Breath sounds: No stridor  No wheezing, rhonchi or rales  Chest:      Chest wall: No tenderness  Abdominal:      General: There is no distension  Palpations: There is no mass  Tenderness: There is no abdominal tenderness   There is no guarding or rebound  Hernia: No hernia is present  Musculoskeletal:         General: Signs of injury (left foot with dsd intact ) present  No swelling, tenderness or deformity  Right lower leg: No edema  Left lower leg: No edema  Skin:     Coloration: Skin is not jaundiced or pale  Findings: No bruising, erythema, lesion or rash  Neurological:      Mental Status: She is alert and oriented to person, place, and time  Psychiatric:         Behavior: Behavior normal           Additional Data:     Labs:  Results from last 7 days   Lab Units 07/29/22  0452   WBC Thousand/uL 7 64   HEMOGLOBIN g/dL 10 7*   HEMATOCRIT % 35 0   PLATELETS Thousands/uL 451*   NEUTROS PCT % 61   LYMPHS PCT % 20   MONOS PCT % 13*   EOS PCT % 4     Results from last 7 days   Lab Units 07/29/22  0452   SODIUM mmol/L 136   POTASSIUM mmol/L 4 0   CHLORIDE mmol/L 104   CO2 mmol/L 29   BUN mg/dL 11   CREATININE mg/dL 0 40*   ANION GAP mmol/L 3*   CALCIUM mg/dL 8 5   ALBUMIN g/dL 2 6*   TOTAL BILIRUBIN mg/dL 0 29   ALK PHOS U/L 94   ALT U/L 20   AST U/L 25   GLUCOSE RANDOM mg/dL 88                       Lines/Drains:  Invasive Devices  Report    Peripheral Intravenous Line  Duration           Peripheral IV 07/28/22 Right;Ventral (anterior) Forearm 3 days          Drain  Duration           Closed/Suction Drain Left;Posterior; Lateral Foot Bulb 10 Fr  7 days                      Imaging: no recent image   Recent Cultures (last 7 days):         Last 24 Hours Medication List:   Current Facility-Administered Medications   Medication Dose Route Frequency Provider Last Rate    acetaminophen  975 mg Oral Q8H [facility] Alesia Gordillo PA-C      Ascorbic Acid (Vitamin C)  375 mg Oral Daily Maximo Angst, DPM      fentaNYL  25 mcg Intravenous Q3 min PRN Leonor Tavera CRNA      ferrous gluconate  324 mg Oral Daily Maximo Angst, DPM      folic acid  9,383 mcg Oral Daily Maximo Angst, DPM      heparin (porcine)  5,000 Units Subcutaneous Q8H [address] Odette Skiff, DPM      hydroxychloroquine  200 mg Oral Daily With Breakfast Odette Skiff, DPM      hydrOXYzine HCL  25 mg Oral Q6H PRN Odette Skiff, DPM      levothyroxine  25 mcg Oral Daily Odette Skiff, DPM      melatonin  6 mg Oral HS Odette Skiff, DPM      meloxicam  7 5 mg Oral Daily Odette Skiff, DPM      methocarbamol  750 mg Oral Q6H PRN Odette Skiff, DPM      methotrexate  2 5 mg Oral Weekly Odette Skiff, DPM      morphine injection  2 mg Intravenous Q4H PRN Xiomara Gordillo PA-C      multivitamin-minerals  1 tablet Oral Daily Odette Skiff, DPM      naloxone  0 04 mg Intravenous Q1MIN PRN Odette Skiff, DPM      nortriptyline  100 mg Oral HS Odette Skiff, DPM      ondansetron  4 mg Intravenous Q4H PRN Odetet Skiff, DPM      oxyCODONE  10 mg Oral Q4H PRN Reji Lyn MD      oxyCODONE  5 mg Oral Q4H PRN Alesia Gordillo PA-C      polyethylene glycol  17 g Oral Daily PRN Odette Skiff, DPM      predniSONE  5 mg Oral Daily Odette Skiff, DPM      rOPINIRole  2 mg Oral HS Odette Skiff, DPM      senna-docusate sodium  1 tablet Oral BID Odette Skiff, DPM          Today, Patient Was Seen By: CONNIE Smith    **Please Note: This note may have been constructed using a voice recognition system  **

## 2022-07-31 NOTE — PLAN OF CARE
Problem: PAIN - ADULT  Goal: Verbalizes/displays adequate comfort level or baseline comfort level  Description: Interventions:  - Encourage patient to monitor pain and request assistance  - Assess pain using appropriate pain scale  - Administer analgesics based on type and severity of pain and evaluate response  - Implement non-pharmacological measures as appropriate and evaluate response  - Consider cultural and social influences on pain and pain management  - Notify physician/advanced practitioner if interventions unsuccessful or patient reports new pain  Outcome: Progressing     Problem: INFECTION - ADULT  Goal: Absence or prevention of progression during hospitalization  Description: INTERVENTIONS:  - Assess and monitor for signs and symptoms of infection  - Monitor lab/diagnostic results  - Monitor all insertion sites, i e  indwelling lines, tubes, and drains  - Monitor endotracheal if appropriate and nasal secretions for changes in amount and color  - [place] appropriate cooling/warming therapies per order  - Administer medications as ordered  - Instruct and encourage patient and family to use good hand hygiene technique  - Identify and instruct in appropriate isolation precautions for identified infection/condition  Outcome: Progressing     Problem: SAFETY ADULT  Goal: Patient will remain free of falls  Description: INTERVENTIONS:  - Educate patient/family on patient safety including physical limitations  - Instruct patient to call for assistance with activity   - Consult OT/PT to assist with strengthening/mobility   - Keep Call bell within reach  - Keep bed low and locked with side rails adjusted as appropriate  - Keep care items and personal belongings within reach  - Initiate and maintain comfort rounds  - Make Fall Risk Sign visible to staff  - Offer Toileting every **2* Hours, in advance of need  - Initiate/Maintain **bed*alarm  - Apply yellow socks and bracelet for high fall risk patients  - Consider moving patient to room near nurses station  Outcome: Progressing

## 2022-07-31 NOTE — PLAN OF CARE
Problem: PAIN - ADULT  Goal: Verbalizes/displays adequate comfort level or baseline comfort level  Description: Interventions:  - Encourage patient to monitor pain and request assistance  - Assess pain using appropriate pain scale  - Administer analgesics based on type and severity of pain and evaluate response  - Implement non-pharmacological measures as appropriate and evaluate response  - Consider cultural and social influences on pain and pain management  - Notify physician/advanced practitioner if interventions unsuccessful or patient reports new pain  Outcome: Progressing     Problem: INFECTION - ADULT  Goal: Absence or prevention of progression during hospitalization  Description: INTERVENTIONS:  - Assess and monitor for signs and symptoms of infection  - Monitor lab/diagnostic results  - Monitor all insertion sites, i e  indwelling lines, tubes, and drains  - Monitor endotracheal if appropriate and nasal secretions for changes in amount and color  - [place] appropriate cooling/warming therapies per order  - Administer medications as ordered  - Instruct and encourage patient and family to use good hand hygiene technique  - Identify and instruct in appropriate isolation precautions for identified infection/condition  Outcome: Progressing  Goal: Absence of fever/infection during neutropenic period  Description: INTERVENTIONS:  - Monitor WBC    Outcome: Progressing     Problem: SAFETY ADULT  Goal: Patient will remain free of falls  Description: INTERVENTIONS:  - Educate patient/family on patient safety including physical limitations  - Instruct patient to call for assistance with activity   - Consult OT/PT to assist with strengthening/mobility   - Keep Call bell within reach  - Keep bed low and locked with side rails adjusted as appropriate  - Keep care items and personal belongings within reach  - Initiate and maintain comfort rounds  - Make Fall Risk Sign visible to staff  - Apply yellow socks and bracelet for high fall risk patients  - Consider moving patient to room near nurses station  Outcome: Progressing  Goal: Maintain or return to baseline ADL function  Description: INTERVENTIONS:  -  Assess patient's ability to carry out ADLs; assess patient's baseline for ADL function and identify physical deficits which impact ability to perform ADLs (bathing, care of mouth/teeth, toileting, grooming, dressing, etc )  - Assess/evaluate cause of self-care deficits   - Assess range of motion  - Assess patient's mobility; develop plan if impaired  - Assess patient's need for assistive devices and provide as appropriate  - Encourage maximum independence but intervene and supervise when necessary  - Involve family in performance of ADLs  - Assess for home care needs following discharge   - Consider OT consult to assist with ADL evaluation and planning for discharge  - Provide patient education as appropriate  Outcome: Progressing  Goal: Maintains/Returns to pre admission functional level  Description: INTERVENTIONS:  - Perform BMAT or MOVE assessment daily    - Set and communicate daily mobility goal to care team and patient/family/caregiver  - Collaborate with rehabilitation services on mobility goals if consulted  - Perform Range of Motion 4 times a day  - Reposition patient every 2 hours    - Dangle patient 4 times a day  - Stand patient 3 times a day  - Ambulate patient 3 times a day  - Out of bed to chair 3 times a day   - Out of bed for meals 3 times a day  - Out of bed for toileting  - Record patient progress and toleration of activity level   Outcome: Progressing     Problem: DISCHARGE PLANNING  Goal: Discharge to home or other facility with appropriate resources  Description: INTERVENTIONS:  - Identify barriers to discharge w/patient and caregiver  - Arrange for needed discharge resources and transportation as appropriate  - Identify discharge learning needs (meds, wound care, etc )  - Arrange for interpretive services to assist at discharge as needed  - Refer to Case Management Department for coordinating discharge planning if the patient needs post-hospital services based on physician/advanced practitioner order or complex needs related to functional status, cognitive ability, or social support system  Outcome: Progressing     Problem: Knowledge Deficit  Goal: Patient/family/caregiver demonstrates understanding of disease process, treatment plan, medications, and discharge instructions  Description: Complete learning assessment and assess knowledge base  Interventions:  - Provide teaching at level of understanding  - Provide teaching via preferred learning methods  Outcome: Progressing     Problem: MOBILITY - ADULT  Goal: Maintain or return to baseline ADL function  Description: INTERVENTIONS:  -  Assess patient's ability to carry out ADLs; assess patient's baseline for ADL function and identify physical deficits which impact ability to perform ADLs (bathing, care of mouth/teeth, toileting, grooming, dressing, etc )  - Assess/evaluate cause of self-care deficits   - Assess range of motion  - Assess patient's mobility; develop plan if impaired  - Assess patient's need for assistive devices and provide as appropriate  - Encourage maximum independence but intervene and supervise when necessary  - Involve family in performance of ADLs  - Assess for home care needs following discharge   - Consider OT consult to assist with ADL evaluation and planning for discharge  - Provide patient education as appropriate  Outcome: Progressing  Goal: Maintains/Returns to pre admission functional level  Description: INTERVENTIONS:  - Perform BMAT or MOVE assessment daily    - Set and communicate daily mobility goal to care team and patient/family/caregiver     - Collaborate with rehabilitation services on mobility goals if consulted  - Out of bed for toileting  - Record patient progress and toleration of activity level   Outcome: Progressing Problem: Prexisting or High Potential for Compromised Skin Integrity  Goal: Skin integrity is maintained or improved  Description: INTERVENTIONS:  - Identify patients at risk for skin breakdown  - Assess and monitor skin integrity  - Assess and monitor nutrition and hydration status  - Monitor labs   - Assess for incontinence   - Turn and reposition patient  - Assist with mobility/ambulation  - Relieve pressure over bony prominences  - Avoid friction and shearing  - Provide appropriate hygiene as needed including keeping skin clean and dry  - Evaluate need for skin moisturizer/barrier cream  - Collaborate with interdisciplinary team   - Patient/family teaching  - Consider wound care consult   Outcome: Progressing     Problem: Nutrition/Hydration-ADULT  Goal: Nutrient/Hydration intake appropriate for improving, restoring or maintaining nutritional needs  Description: Monitor and assess patient's nutrition/hydration status for malnutrition  Collaborate with interdisciplinary team and initiate plan and interventions as ordered  Monitor patient's weight and dietary intake as ordered or per policy  Utilize nutrition screening tool and intervene as necessary  Determine patient's food preferences and provide high-protein, high-caloric foods as appropriate       INTERVENTIONS:  - Monitor oral intake, urinary output, labs, and treatment plans  - Assess nutrition and hydration status and recommend course of action  - Evaluate amount of meals eaten  - Assist patient with eating if necessary   - Allow adequate time for meals  - Recommend/ encourage appropriate diets, oral nutritional supplements, and vitamin/mineral supplements  - Order, calculate, and assess calorie counts as needed  - Recommend, monitor, and adjust tube feedings and TPN/PPN based on assessed needs  - Assess need for intravenous fluids  - Provide specific nutrition/hydration education as appropriate  - Include patient/family/caregiver in decisions related to nutrition  Outcome: Progressing     Problem: Potential for Falls  Goal: Patient will remain free of falls  Description: INTERVENTIONS:  - Educate patient/family on patient safety including physical limitations  - Instruct patient to call for assistance with activity   - Consult OT/PT to assist with strengthening/mobility   - Keep Call bell within reach  - Keep bed low and locked with side rails adjusted as appropriate  - Keep care items and personal belongings within reach  - Initiate and maintain comfort rounds  - Make Fall Risk Sign visible to staff  - Offer Toileting every 2 Hours, in advance of need  - Initiate/Maintain bed alarm  - Obtain necessary fall risk management equipment  - Apply yellow socks and bracelet for high fall risk patients  - Consider moving patient to room near nurses station  Outcome: Progressing

## 2022-07-31 NOTE — CASE MANAGEMENT
Case Management Discharge Planning Note    Patient name Bernardo Irizarry  Location /-01 MRN [id number]  : 1945 Date 2022       Current Admission Date: 2022  Current Admission Diagnosis:Rheumatoid arthritis involving multiple sites with positive rheumatoid factor [facility])   Patient Active Problem List    Diagnosis Date Noted    Venous insufficiency 2021    Shoulder pain, bilateral 2021    Failed orthopedic implant ([facility] [address] ) 2021    Encounter for annual wellness exam in Medicare patient 2021    S/P reverse total shoulder arthroplasty, left 2021    Rupture long head biceps tendon, left, initial encounter 2021    Acquired subluxation of left shoulder 2021    Depression 2020    Other forms of systemic lupus erythematosus ([facility] ) 2020    Dyspepsia 2019    Vitamin D deficiency 2019    Chronic pain syndrome 2019    [facility]    Ambulatory dysfunction 2018    Closed compression fracture of L3 lumbar vertebra 2018    S/P total hip arthroplasty 2017    Anxiety 2017    RLS (restless legs syndrome) 2017    RBBB 2017    Age-related osteoporosis without current pathological fracture 2017    Hypothyroidism due to Hashimoto's thyroiditis 2016      LOS (days): 10  Geometric Mean LOS (GMLOS) (days): 7 60  Days to GMLOS:-2 1     OBJECTIVE:  Risk of Unplanned Readmission Score: 15 44         Current admission status: Inpatient   Preferred Pharmacy:   [address] [place] [address]  Phone: [phone] Fax: [phone]    Primary Care Provider: Jaden Mendez MD    Primary Insurance: MEDICARE  Secondary Insurance:     DISCHARGE DETAILS:    Other Referral/Resources/Interventions Provided:  Referral Comments: Family refusing Obdulio Saeed  CM changed due date and time in [place] [address] notified[de-identified] Phone call from Michael Chavez, pt niece [phone]  Received message and spoke with Andrea Rosenbaum and got an update  Alyssa asked if also CM for her uncle Ricci Mosquera, he is pt brother and in 64  CM advised that I am covering him today as well  She stated as her [facility] would be the first choice  Alyssa's daughter works at [employer]  Alyssa stated the d/c plan from [facility] would be to return to pt sister home [address]   Pt is set up wit [employer], [facility] and [facility], also has a [facility] pass

## 2022-08-01 LAB
ANION GAP SERPL CALCULATED.3IONS-SCNC: 2 MMOL/L (ref 4–13)
BASOPHILS # BLD AUTO: 0.03 THOUSANDS/ΜL (ref 0–0.1)
BASOPHILS NFR BLD AUTO: 1 % (ref 0–1)
BUN SERPL-MCNC: 18 MG/DL (ref 5–25)
CALCIUM SERPL-MCNC: 8.5 MG/DL (ref 8.3–10.1)
CHLORIDE SERPL-SCNC: 103 MMOL/L (ref 96–108)
CO2 SERPL-SCNC: 29 MMOL/L (ref 21–32)
CREAT SERPL-MCNC: 0.48 MG/DL (ref 0.6–1.3)
EOSINOPHIL # BLD AUTO: 0.21 THOUSAND/ΜL (ref 0–0.61)
EOSINOPHIL NFR BLD AUTO: 3 % (ref 0–6)
ERYTHROCYTE [DISTWIDTH] IN BLOOD BY AUTOMATED COUNT: 17.5 % (ref 11.6–15.1)
FLUAV RNA RESP QL NAA+PROBE: NEGATIVE
FLUBV RNA RESP QL NAA+PROBE: NEGATIVE
GFR SERPL CREATININE-BSD FRML MDRD: 94 ML/MIN/1.73SQ M
GLUCOSE SERPL-MCNC: 84 MG/DL (ref 65–140)
HCT VFR BLD AUTO: 34.9 % (ref 34.8–46.1)
HGB BLD-MCNC: 10.5 G/DL (ref 11.5–15.4)
IMM GRANULOCYTES # BLD AUTO: 0.12 THOUSAND/UL (ref 0–0.2)
IMM GRANULOCYTES NFR BLD AUTO: 2 % (ref 0–2)
LYMPHOCYTES # BLD AUTO: 1.63 THOUSANDS/ΜL (ref 0.6–4.47)
LYMPHOCYTES NFR BLD AUTO: 25 % (ref 14–44)
MCH RBC QN AUTO: 27.6 PG (ref 26.8–34.3)
MCHC RBC AUTO-ENTMCNC: 30.1 G/DL (ref 31.4–37.4)
MCV RBC AUTO: 92 FL (ref 82–98)
MONOCYTES # BLD AUTO: 0.87 THOUSAND/ΜL (ref 0.17–1.22)
MONOCYTES NFR BLD AUTO: 13 % (ref 4–12)
NEUTROPHILS # BLD AUTO: 3.68 THOUSANDS/ΜL (ref 1.85–7.62)
NEUTS SEG NFR BLD AUTO: 56 % (ref 43–75)
NRBC BLD AUTO-RTO: 0 /100 WBCS
PLATELET # BLD AUTO: 432 THOUSANDS/UL (ref 149–390)
PMV BLD AUTO: 8.3 FL (ref 8.9–12.7)
POTASSIUM SERPL-SCNC: 4.3 MMOL/L (ref 3.5–5.3)
RBC # BLD AUTO: 3.8 MILLION/UL (ref 3.81–5.12)
RSV RNA RESP QL NAA+PROBE: NEGATIVE
SARS-COV-2 RNA RESP QL NAA+PROBE: NEGATIVE
SODIUM SERPL-SCNC: 134 MMOL/L (ref 135–147)
WBC # BLD AUTO: 6.54 THOUSAND/UL (ref 4.31–10.16)

## 2022-08-01 PROCEDURE — 97530 THERAPEUTIC ACTIVITIES: CPT

## 2022-08-01 PROCEDURE — 97535 SELF CARE MNGMENT TRAINING: CPT

## 2022-08-01 PROCEDURE — 80048 BASIC METABOLIC PNL TOTAL CA: CPT | Performed by: NURSE PRACTITIONER

## 2022-08-01 PROCEDURE — 0241U HB NFCT DS VIR RESP RNA 4 TRGT: CPT | Performed by: STUDENT IN AN ORGANIZED HEALTH CARE EDUCATION/TRAINING PROGRAM

## 2022-08-01 PROCEDURE — 99024 POSTOP FOLLOW-UP VISIT: CPT | Performed by: PODIATRIST

## 2022-08-01 PROCEDURE — 97110 THERAPEUTIC EXERCISES: CPT

## 2022-08-01 PROCEDURE — 97112 NEUROMUSCULAR REEDUCATION: CPT

## 2022-08-01 PROCEDURE — 85025 COMPLETE CBC W/AUTO DIFF WBC: CPT | Performed by: NURSE PRACTITIONER

## 2022-08-01 RX ADMIN — MULTIPLE VITAMINS W/ MINERALS TAB 1 TABLET: TAB ORAL at 08:08

## 2022-08-01 RX ADMIN — LEVOTHYROXINE SODIUM 25 MCG: 25 TABLET ORAL at 08:08

## 2022-08-01 RX ADMIN — ACETAMINOPHEN 975 MG: 325 TABLET, FILM COATED ORAL at 13:08

## 2022-08-01 RX ADMIN — SENNOSIDES AND DOCUSATE SODIUM 1 TABLET: 8.6; 5 TABLET ORAL at 08:08

## 2022-08-01 RX ADMIN — FOLIC ACID 2000 MCG: 1 TABLET ORAL at 08:08

## 2022-08-01 RX ADMIN — HYDROXYZINE HYDROCHLORIDE 25 MG: 25 TABLET ORAL at 17:57

## 2022-08-01 RX ADMIN — FERROUS GLUCONATE 324 MG: 324 TABLET ORAL at 08:09

## 2022-08-01 RX ADMIN — HYDROXYCHLOROQUINE SULFATE 200 MG: 200 TABLET ORAL at 08:10

## 2022-08-01 RX ADMIN — MELOXICAM 7.5 MG: 7.5 TABLET ORAL at 08:10

## 2022-08-01 RX ADMIN — HEPARIN SODIUM 5000 UNITS: 5000 INJECTION INTRAVENOUS; SUBCUTANEOUS at 13:08

## 2022-08-01 RX ADMIN — MELATONIN 6 MG: at 21:55

## 2022-08-01 RX ADMIN — Medication 375 MG: at 08:09

## 2022-08-01 RX ADMIN — METHOCARBAMOL TABLETS 750 MG: 750 TABLET, COATED ORAL at 11:58

## 2022-08-01 RX ADMIN — HEPARIN SODIUM 5000 UNITS: 5000 INJECTION INTRAVENOUS; SUBCUTANEOUS at 21:52

## 2022-08-01 RX ADMIN — NORTRIPTYLINE HYDROCHLORIDE 100 MG: 50 CAPSULE ORAL at 21:53

## 2022-08-01 RX ADMIN — ACETAMINOPHEN 975 MG: 325 TABLET, FILM COATED ORAL at 21:54

## 2022-08-01 RX ADMIN — METHOCARBAMOL TABLETS 750 MG: 750 TABLET, COATED ORAL at 17:55

## 2022-08-01 RX ADMIN — OXYCODONE HYDROCHLORIDE 10 MG: 10 TABLET ORAL at 20:19

## 2022-08-01 RX ADMIN — OXYCODONE HYDROCHLORIDE 10 MG: 10 TABLET ORAL at 10:38

## 2022-08-01 RX ADMIN — METHOCARBAMOL TABLETS 750 MG: 750 TABLET, COATED ORAL at 05:46

## 2022-08-01 RX ADMIN — ROPINIROLE HYDROCHLORIDE 2 MG: 1 TABLET, FILM COATED ORAL at 21:54

## 2022-08-01 RX ADMIN — OXYCODONE HYDROCHLORIDE 10 MG: 10 TABLET ORAL at 05:46

## 2022-08-01 RX ADMIN — HEPARIN SODIUM 5000 UNITS: 5000 INJECTION INTRAVENOUS; SUBCUTANEOUS at 05:46

## 2022-08-01 RX ADMIN — ACETAMINOPHEN 975 MG: 325 TABLET, FILM COATED ORAL at 05:46

## 2022-08-01 RX ADMIN — PREDNISONE 5 MG: 5 TABLET ORAL at 08:08

## 2022-08-01 RX ADMIN — OXYCODONE HYDROCHLORIDE 10 MG: 10 TABLET ORAL at 16:17

## 2022-08-01 NOTE — OCCUPATIONAL THERAPY NOTE
Occupational Therapy Treatment Note     08/01/22 1407   OT Last Visit   OT Visit Date 08/01/22   Note Type   Note Type Treatment   Restrictions/Precautions   Weight Bearing Precautions Per Order Yes   LLE Weight Bearing Per Order (S)  NWB  (reviewed w/ the pt)   Braces or Orthoses   (denies)   Other Precautions WBS; Fall Risk;Telemetry;Multiple lines   Lifestyle   Autonomy Family assists pt with dressing/bathing, occassional assistance with self feeding due to shoulder flexion deficits, family and HHA assist with all IaDL's +RW with functional mobility, (-) per pt report hasnt driven in a year  Reciprocal Relationships sister, mirna, monique   Service to Others retired seamstress   Intrinsic Gratification TV, socializing with family   Pain Assessment   Pain Assessment Tool 0-10   Pain Score No Pain   ADL   Where Assessed Edge of bed   Grooming Assistance 5  Supervision/Setup   Grooming Deficit Wash/dry face;Brushing hair   UB Dressing Assistance 3  Moderate Assistance   UB Dressing Deficit Thread RUE; Thread LUE   Toileting Assistance  2  Maximal Assistance   Toileting Deficit Perineal hygiene   Bed Mobility   Supine to Sit 3  Moderate assistance   Additional items Assist x 2   Transfers   Sit to Stand 2  Maximal assistance   Additional items Assist x 2   Stand to Sit 2  Maximal assistance   Additional items Assist x 2   Cognition   Overall Cognitive Status [facility]   Arousal/Participation Alert; Cooperative   Attention Attends with cues to redirect   Orientation Level Oriented X4   Memory Within functional limits   Following Commands Follows one step commands without difficulty   Activity Tolerance   Activity Tolerance Patient tolerated treatment well   Assessment   Assessment Pt seen for pt participation in Occupational Therapy session with focus on activity tolerance, bed mob, functional transfers/stand pivot transfers and  sitting balance and tolerance and for pt engagement in UB/LB self-care task    Pt cleared by MARYAM/Cliff for pt participated in OT session  Pt presented supine pt awake/alert and agreeable to participate in therapy following pt identifiers confirmed  Pt reported her therapy goal today was to get out of bed to her bedside chair  Pt required assist for  Bed mob and sit to stand transfers 2* coordination and balance  Pt continues to require assist for toilet hygiene and self-care task/dressing 2* pt b/l UE  strength  Pt will require post acute rehab service to continue to address these above noted pt deficit which currently impair pt ADL and functional mob  Pt return to bed chair post session post session, chair  alarm activated and all needs within reach     Plan   Treatment Interventions ADL retraining   Goal Expiration Date 22   Recommendation   OT Discharge Recommendation Post acute rehabilitation services   AM-PAC Daily Activity Inpatient   Lower Body Dressing 1   Bathing 2   Toileting 1   Upper Body Dressing 1   Grooming 1   Eating 1   Daily Activity Raw Score 7   Turning Head Towards Sound 4   Follow Simple Instructions 4   Low Function Daily Activity Raw Score 15   Low Function Daily Activity Standardized Score 26 28   AM-PAC Applied Cognition Inpatient   Following a Speech/Presentation 3   Understanding Ordinary Conversation 4   Taking Medications 4   Remembering Where Things Are Placed or Put Away 4   Remembering List of 4-5 Errands 4   Taking Care of Complicated Tasks 4   Applied Cognition Raw Score 23   Applied Cognition Standardized Score 53 08   Barthel Index   Feeding 5   Bathing 0   Grooming Score 0   Dressing Score 0   Bladder Score 0   Bowels Score 0   Toilet Use Score 5   Transfers (Bed/Chair) Score 5   Mobility (Level Surface) Score 0   Stairs Score 0   Barthel Index Score 15       Haley FOSTER

## 2022-08-01 NOTE — CASE MANAGEMENT
Case Management Discharge Planning Note    Patient name Dwayne Pacheco  Location /-01 MRN [id number]  : 1945 Date 2022       Current Admission Date: 2022  Current Admission Diagnosis:Rheumatoid arthritis involving multiple sites with positive rheumatoid factor [facility])   Patient Active Problem List    Diagnosis Date Noted    Venous insufficiency 2021    Shoulder pain, bilateral 2021    Failed orthopedic implant ([facility] [address] ) 2021    Encounter for annual wellness exam in Medicare patient 2021    S/P reverse total shoulder arthroplasty, left 2021    Rupture long head biceps tendon, left, initial encounter 2021    Acquired subluxation of left shoulder 2021    Depression 2020    Other forms of systemic lupus erythematosus ([facility] ) 2020    Dyspepsia 2019    Vitamin D deficiency 2019    Chronic pain syndrome 2019    Rheumatoid arthritis involving multiple sites with positive rheumatoid factor ([email or web address] ) 2019    Ambulatory dysfunction 2018    Closed compression fracture of L3 lumbar vertebra 2018    S/P total hip arthroplasty 2017    Anxiety 2017    RLS (restless legs syndrome) 2017    RBBB 2017    Age-related osteoporosis without current pathological fracture 2017    Hypothyroidism due to Hashimoto's thyroiditis 2016      LOS (days): 11  Geometric Mean LOS (GMLOS) (days): 7 60  Days to GMLOS:-3 3     OBJECTIVE:  Risk of Unplanned Readmission Score: 14 37         Current admission status: Inpatient   Preferred Pharmacy:   [address] [address]  Phone: [phone] Fax: [phone]    Primary Care Provider: Julio Randle MD    Primary Insurance: MEDICARE  Secondary Insurance:     DISCHARGE DETAILS:    Discharge planning discussed with[de-identified] Rama Kelly at [facility]     Comments - Freedom of Choice: Possible bed 8/2 at [address] Pt's granddaughter works there and that is the families first choice  CM contacted family/caregiver?: Yes  Were Treatment Team discharge recommendations reviewed with patient/caregiver?: Yes  Did patient/caregiver verbalize understanding of patient care needs?: Yes  Were patient/caregiver advised of the risks associated with not following Treatment Team discharge recommendations?: Yes                             Treatment Team Recommendation: Short Term Rehab  Discharge Destination Plan[de-identified] Short Term Rehab

## 2022-08-01 NOTE — PLAN OF CARE
Problem: PAIN - ADULT  Goal: Verbalizes/displays adequate comfort level or baseline comfort level  Description: Interventions:  - Encourage patient to monitor pain and request assistance  - Assess pain using appropriate pain scale  - Administer analgesics based on type and severity of pain and evaluate response  - Implement non-pharmacological measures as appropriate and evaluate response  - Consider cultural and social influences on pain and pain management  - Notify physician/advanced practitioner if interventions unsuccessful or patient reports new pain  Outcome: Progressing     Problem: INFECTION - ADULT  Goal: Absence or prevention of progression during hospitalization  Description: INTERVENTIONS:  - Assess and monitor for signs and symptoms of infection  - Monitor lab/diagnostic results  - Monitor all insertion sites, i e  indwelling lines, tubes, and drains  - Monitor endotracheal if appropriate and nasal secretions for changes in amount and color  - [place] appropriate cooling/warming therapies per order  - Administer medications as ordered  - Instruct and encourage patient and family to use good hand hygiene technique  - Identify and instruct in appropriate isolation precautions for identified infection/condition  Outcome: Progressing  Goal: Absence of fever/infection during neutropenic period  Description: INTERVENTIONS:  - Monitor WBC    Outcome: Progressing     Problem: SAFETY ADULT  Goal: Patient will remain free of falls  Description: INTERVENTIONS:  - Educate patient/family on patient safety including physical limitations  - Instruct patient to call for assistance with activity   - Consult OT/PT to assist with strengthening/mobility   - Keep Call bell within reach  - Keep bed low and locked with side rails adjusted as appropriate  - Keep care items and personal belongings within reach  - Initiate and maintain comfort rounds  - Make Fall Risk Sign visible to staff  - Apply yellow socks and bracelet for high fall risk patients  - Consider moving patient to room near nurses station  Outcome: Progressing  Goal: Maintain or return to baseline ADL function  Description: INTERVENTIONS:  -  Assess patient's ability to carry out ADLs; assess patient's baseline for ADL function and identify physical deficits which impact ability to perform ADLs (bathing, care of mouth/teeth, toileting, grooming, dressing, etc )  - Assess/evaluate cause of self-care deficits   - Assess range of motion  - Assess patient's mobility; develop plan if impaired  - Assess patient's need for assistive devices and provide as appropriate  - Encourage maximum independence but intervene and supervise when necessary  - Involve family in performance of ADLs  - Assess for home care needs following discharge   - Consider OT consult to assist with ADL evaluation and planning for discharge  - Provide patient education as appropriate  Outcome: Progressing  Goal: Maintains/Returns to pre admission functional level  Description: INTERVENTIONS:  - Perform BMAT or MOVE assessment daily    - Set and communicate daily mobility goal to care team and patient/family/caregiver     - Collaborate with rehabilitation services on mobility goals if consulted  - Record patient progress and toleration of activity level   Outcome: Progressing     Problem: DISCHARGE PLANNING  Goal: Discharge to home or other facility with appropriate resources  Description: INTERVENTIONS:  - Identify barriers to discharge w/patient and caregiver  - Arrange for needed discharge resources and transportation as appropriate  - Identify discharge learning needs (meds, wound care, etc )  - Arrange for interpretive services to assist at discharge as needed  - Refer to Case Management Department for coordinating discharge planning if the patient needs post-hospital services based on physician/advanced practitioner order or complex needs related to functional status, cognitive ability, or social support system  Outcome: Progressing     Problem: Knowledge Deficit  Goal: Patient/family/caregiver demonstrates understanding of disease process, treatment plan, medications, and discharge instructions  Description: Complete learning assessment and assess knowledge base  Interventions:  - Provide teaching at level of understanding  - Provide teaching via preferred learning methods  Outcome: Progressing     Problem: MOBILITY - ADULT  Goal: Maintain or return to baseline ADL function  Description: INTERVENTIONS:  -  Assess patient's ability to carry out ADLs; assess patient's baseline for ADL function and identify physical deficits which impact ability to perform ADLs (bathing, care of mouth/teeth, toileting, grooming, dressing, etc )  - Assess/evaluate cause of self-care deficits   - Assess range of motion  - Assess patient's mobility; develop plan if impaired  - Assess patient's need for assistive devices and provide as appropriate  - Encourage maximum independence but intervene and supervise when necessary  - Involve family in performance of ADLs  - Assess for home care needs following discharge   - Consider OT consult to assist with ADL evaluation and planning for discharge  - Provide patient education as appropriate  Outcome: Progressing  Goal: Maintains/Returns to pre admission functional level  Description: INTERVENTIONS:  - Perform BMAT or MOVE assessment daily    - Set and communicate daily mobility goal to care team and patient/family/caregiver     - Collaborate with rehabilitation services on mobility goals if consulted  - Record patient progress and toleration of activity level   Outcome: Progressing     Problem: Prexisting or High Potential for Compromised Skin Integrity  Goal: Skin integrity is maintained or improved  Description: INTERVENTIONS:  - Identify patients at risk for skin breakdown  - Assess and monitor skin integrity  - Assess and monitor nutrition and hydration status  - Monitor labs   - Assess for incontinence   - Turn and reposition patient  - Assist with mobility/ambulation  - Relieve pressure over bony prominences  - Avoid friction and shearing  - Provide appropriate hygiene as needed including keeping skin clean and dry  - Evaluate need for skin moisturizer/barrier cream  - Collaborate with interdisciplinary team   - Patient/family teaching  - Consider wound care consult   Outcome: Progressing     Problem: Nutrition/Hydration-ADULT  Goal: Nutrient/Hydration intake appropriate for improving, restoring or maintaining nutritional needs  Description: Monitor and assess patient's nutrition/hydration status for malnutrition  Collaborate with interdisciplinary team and initiate plan and interventions as ordered  Monitor patient's weight and dietary intake as ordered or per policy  Utilize nutrition screening tool and intervene as necessary  Determine patient's food preferences and provide high-protein, high-caloric foods as appropriate       INTERVENTIONS:  - Monitor oral intake, urinary output, labs, and treatment plans  - Assess nutrition and hydration status and recommend course of action  - Evaluate amount of meals eaten  - Assist patient with eating if necessary   - Allow adequate time for meals  - Recommend/ encourage appropriate diets, oral nutritional supplements, and vitamin/mineral supplements  - Order, calculate, and assess calorie counts as needed  - Recommend, monitor, and adjust tube feedings and TPN/PPN based on assessed needs  - Assess need for intravenous fluids  - Provide specific nutrition/hydration education as appropriate  - Include patient/family/caregiver in decisions related to nutrition  Outcome: Progressing     Problem: Potential for Falls  Goal: Patient will remain free of falls  Description: INTERVENTIONS:  - Educate patient/family on patient safety including physical limitations  - Instruct patient to call for assistance with activity   - Consult OT/PT to assist with strengthening/mobility   - Keep Call bell within reach  - Keep bed low and locked with side rails adjusted as appropriate  - Keep care items and personal belongings within reach  - Initiate and maintain comfort rounds  - Consider moving patient to room near nurses station  Outcome: Progressing

## 2022-08-01 NOTE — PLAN OF CARE
Problem: OCCUPATIONAL THERAPY ADULT  Goal: Performs self-care activities at highest level of function for planned discharge setting  See evaluation for individualized goals  Description: Treatment Interventions: ADL retraining          See flowsheet documentation for full assessment, interventions and recommendations  Outcome: Progressing  Note: Limitation: Decreased ADL status, Decreased UE ROM, Decreased UE strength, Decreased Safe judgement during ADL, Decreased endurance, Decreased fine motor control, Decreased self-care trans, Decreased high-level ADLs, Decreased sensation  Prognosis: Fair  Assessment: Pt seen for pt participation in Occupational Therapy session with focus on activity tolerance, bed mob, functional transfers/stand pivot transfers and  sitting balance and tolerance and for pt engagement in UB/LB self-care task  Pt cleared by MARYAM/Cliff for pt participated in OT session  Pt presented supine pt awake/alert and agreeable to participate in therapy following pt identifiers confirmed  Pt reported her therapy goal today was to get out of bed to her bedside chair  Pt required assist for  Bed mob and sit to stand transfers 2* coordination and balance  Pt continues to require assist for toilet hygiene and self-care task/dressing 2* pt b/l UE  strength  Pt will require post acute rehab service to continue to address these above noted pt deficit which currently impair pt ADL and functional mob  Pt return to bed chair post session post session, chair  alarm activated and all needs within reach       OT Discharge Recommendation: Post acute rehabilitation services

## 2022-08-01 NOTE — PLAN OF CARE
Problem: PHYSICAL THERAPY ADULT  Goal: Performs mobility at highest level of function for planned discharge setting  See evaluation for individualized goals  Description: Treatment/Interventions: Functional transfer training, LE strengthening/ROM, Therapeutic exercise, Endurance training, Equipment eval/education, Bed mobility, Spoke to nursing, OT  Equipment Recommended: Other (Comment) (r/o rw during follow up visits)       See flowsheet documentation for full assessment, interventions and recommendations  Outcome: Progressing  Note: Prognosis: Fair  Problem List: Decreased strength, Decreased range of motion, Decreased endurance, Impaired balance, Decreased mobility, Orthopedic restrictions  Assessment: Today the patient has improved activity tolerance, strength, and balance  Maintaining NWB continues to be difficult for her, but with assistance she was able to pivot out to the chair  Discussed with the patient about a sliding board or the kneeler attachment for the walker  Unfortunately the walker in her room is not designed for use the kneeler attachment, and plan to find one to trial at the next session as this will allow her to weight bear through her bent knee  She is unable to hop for transfers or gait, and this is the only way to allow her to attempt to do so  She is very motivated for therapy, and she is eager to get moving to regain her strength  There are continued deficits in all areas, and she will benefit from continued therapy  Barriers to Discharge: Inaccessible home environment, Decreased caregiver support     PT Discharge Recommendation: Post acute rehabilitation services    See flowsheet documentation for full assessment

## 2022-08-01 NOTE — PHYSICAL THERAPY NOTE
Physical Therapy Progress Note     08/01/22 1415   PT Last Visit   PT Visit Date 08/01/22   Note Type   Note Type Treatment   Pain Assessment   Pain Assessment Tool 0-10   Pain Score 5   Pain Location/Orientation Orientation: Left; Location: Foot   Hospital Pain Intervention(s) Repositioned; Emotional support   Restrictions/Precautions   LLE Weight Bearing Per Order NWB   Other Precautions Fall Risk;Pain;WBS;Chair Alarm  (Alarm active post session )   Subjective   Subjective The patient notes continued pain in her foot, but that it is getting better  She is eager to get out to the chair  Bed Mobility   Rolling R 4  Minimal assistance   Additional items Assist x 1; Increased time required; Bedrails;Verbal cues   Rolling L 3  Moderate assistance   Additional items Assist x 1; Increased time required; Bedrails;Verbal cues   Supine to Sit 3  Moderate assistance   Additional items Assist x 2; Increased time required;Verbal cues;LE management   Transfers   Sit to Stand 2  Maximal assistance   Additional items Assist x 2;Other;Verbal cues  (Modified hammock technique and support for NWB LLE )   Stand to Sit 2  Maximal assistance   Additional items Assist x 2;Verbal cues; Other  (Modified hammock technique and support for NWB LLE )   Stand pivot 2  Maximal assistance   Additional items Assist x 2;Verbal cues; Other  (Modified hammock technique and support for NWB LLE )   Balance   Static Sitting Fair -   Dynamic Sitting Poor +   Static Standing Poor -   Dynamic Standing Poor -   Activity Tolerance   Activity Tolerance Patient tolerated treatment well   Nurse 146 Sena Urbano RN  Exercises   TKR Sitting;Bilateral;AROM;10 reps;5 reps  (x2 sets )   Assessment   Prognosis Fair   Problem List Decreased strength;Decreased range of motion;Decreased endurance; Impaired balance;Decreased mobility;Orthopedic restrictions   Assessment Today the patient has improved activity tolerance, strength, and balance   Maintaining NWB continues to be difficult for her, but with assistance she was able to pivot out to the chair  Discussed with the patient about a sliding board or the kneeler attachment for the walker  Unfortunately the walker in her room is not designed for use the kneeler attachment, and plan to find one to trial at the next session as this will allow her to weight bear through her bent knee  She is unable to hop for transfers or gait, and this is the only way to allow her to attempt to do so  She is very motivated for therapy, and she is eager to get moving to regain her strength  There are continued deficits in all areas, and she will benefit from continued therapy  Barriers to Discharge Inaccessible home environment;Decreased caregiver support   Goals   Patient Goals To get moving  STG Expiration Date 08/04/22   PT Treatment Day 2   Plan   Treatment/Interventions Functional transfer training;LE strengthening/ROM; Therapeutic exercise; Endurance training;Patient/family training;Bed mobility   Progress Progressing toward goals   PT Frequency 3-5x/wk   Recommendation   PT Discharge Recommendation Post acute rehabilitation services   AM-PAC Basic Mobility Inpatient   Turning in Bed Without Bedrails 2   Lying on Back to Sitting on Edge of Flat Bed 2   Moving Bed to Chair 1   Standing Up From Chair 1   Walk in Room 1   Climb 3-5 Stairs 1   Basic Mobility Inpatient Raw Score 8   Turning Head Towards Sound 4   Follow Simple Instructions 4   Low Function Basic Mobility Raw Score 16   Low Function Basic Mobility Standardized Score 25 72   Highest Level Of Mobility   JH-HLM Goal 3: Sit at edge of bed   JH-HLM Achieved 4: Move to chair/commode       An AM-PAC Basic Mobility standardized score less than 40 78 suggests the patient may benefit from discharge to post-acute rehab services      Thalia Jackson, RODGER

## 2022-08-01 NOTE — PROGRESS NOTES
[facility] Podiatry - Progress Note  Patient: Dayna Montelongo Colon 65 y o  female   MRN: [id number]  PCP: Loly Cruz MD  Unit/Bed#: -73 Encounter: [id number]  Date Of Visit: 22    ASSESSMENT:    Micheal Moore is a 65 y o  female with:    1  Left plantar 5th metatarsal head ulceration with underlying osteomyelitis-Hernandez 3-POA  a  S/p left partial 5th ray resection with flap closure DOS: 2022  2  Left leg venous stasis ulcer-POA  3  Chronic venous hypertension  4  Rheumatoid arthritis  5  Hypothyroidism due to Hashimoto's disease  6  Restless legs syndrome     PLAN:     · Dressings left clean dry and intact  Discussed with CM  Patient may be able to discharge today  · COVID screening- ordered  · Continue current pain regimen  · Appreciate recommendations of consulting services  · Continue local wound care, Adaptic, 4 x 4 gauze, Kerlix, 4 in ACE  · Elevation and offloading on green foam wedges or pillows when non-ambulatory  Pharmacologic VTE Prophylaxis: Heparin   Mechanical VTE Prophylaxis: sequential compression device       Disposition:  Patient requires >2 midnight stay for placement  SUBJECTIVE:     The patient was seen, evaluated, and assessed at bedside today  The patient was awake, alert, and in no acute distress  No acute events overnight  The patient reports well enough, patient states pain is manageable    Patient denies N/V/F/chills/SOB/CP  OBJECTIVE:     Vitals:   /50   Pulse 71   Temp 97 7 °F (36 5 °C)   Resp 15   Ht 4' 10" (1 473 m)   Wt 44 6 kg (98 lb 5 2 oz)   SpO2 99%   BMI 20 55 kg/m²     Temp (24hrs), Av 9 °F (36 6 °C), Min:97 7 °F (36 5 °C), Max:98 °F (36 7 °C)      Physical Exam :     General:  Alert, cooperative, and in no distress  Lower extremity exam:  Dressings left clean dry and intact no erythema tracking proximally distally  Normal hyperemic response noted to digits  Clinical Images 22:       Additional Data: Labs:    Results from last 7 days   Lab Units 08/01/22  0505   WBC Thousand/uL 6 54   HEMOGLOBIN g/dL 10 5*   HEMATOCRIT % 34 9   PLATELETS Thousands/uL 432*   NEUTROS PCT % 56   LYMPHS PCT % 25   MONOS PCT % 13*   EOS PCT % 3     Results from last 7 days   Lab Units 08/01/22  0505 07/29/22  0452   POTASSIUM mmol/L 4 3 4 0   CHLORIDE mmol/L 103 104   CO2 mmol/L 29 29   BUN mg/dL 18 11   CREATININE mg/dL 0 48* 0 40*   CALCIUM mg/dL 8 5 8 5   ALK PHOS U/L  --  94   ALT U/L  --  20   AST U/L  --  25           * I Have Reviewed All Lab Data Listed Above  Recent Cultures (last 7 days):               Imaging: I have personally reviewed pertinent films in PACS  EKG, Pathology, and Other Studies: I have personally reviewed pertinent reports  ** Please Note: Portions of the record may have been created with voice recognition software  Occasional wrong word or "sound a like" substitutions may have occurred due to the inherent limitations of voice recognition software  Read the chart carefully and recognize, using context, where substitutions have occurred   **

## 2022-08-02 PROBLEM — M86.172 ACUTE OSTEOMYELITIS OF LEFT FOOT (HCC): Status: ACTIVE | Noted: 2022-08-02

## 2022-08-02 LAB
ANION GAP SERPL CALCULATED.3IONS-SCNC: 3 MMOL/L (ref 4–13)
BASOPHILS # BLD AUTO: 0.04 THOUSANDS/ΜL (ref 0–0.1)
BASOPHILS NFR BLD AUTO: 1 % (ref 0–1)
BUN SERPL-MCNC: 21 MG/DL (ref 5–25)
CALCIUM SERPL-MCNC: 9.1 MG/DL (ref 8.3–10.1)
CHLORIDE SERPL-SCNC: 98 MMOL/L (ref 96–108)
CO2 SERPL-SCNC: 29 MMOL/L (ref 21–32)
CREAT SERPL-MCNC: 0.54 MG/DL (ref 0.6–1.3)
EOSINOPHIL # BLD AUTO: 0.11 THOUSAND/ΜL (ref 0–0.61)
EOSINOPHIL NFR BLD AUTO: 2 % (ref 0–6)
ERYTHROCYTE [DISTWIDTH] IN BLOOD BY AUTOMATED COUNT: 17.7 % (ref 11.6–15.1)
GFR SERPL CREATININE-BSD FRML MDRD: 90 ML/MIN/1.73SQ M
GLUCOSE SERPL-MCNC: 109 MG/DL (ref 65–140)
HCT VFR BLD AUTO: 37.1 % (ref 34.8–46.1)
HGB BLD-MCNC: 11.3 G/DL (ref 11.5–15.4)
IMM GRANULOCYTES # BLD AUTO: 0.13 THOUSAND/UL (ref 0–0.2)
IMM GRANULOCYTES NFR BLD AUTO: 2 % (ref 0–2)
LYMPHOCYTES # BLD AUTO: 0.89 THOUSANDS/ΜL (ref 0.6–4.47)
LYMPHOCYTES NFR BLD AUTO: 14 % (ref 14–44)
MCH RBC QN AUTO: 27.8 PG (ref 26.8–34.3)
MCHC RBC AUTO-ENTMCNC: 30.5 G/DL (ref 31.4–37.4)
MCV RBC AUTO: 91 FL (ref 82–98)
MONOCYTES # BLD AUTO: 0.65 THOUSAND/ΜL (ref 0.17–1.22)
MONOCYTES NFR BLD AUTO: 10 % (ref 4–12)
MYCOBACTERIUM SPEC CULT: NORMAL
NEUTROPHILS # BLD AUTO: 4.68 THOUSANDS/ΜL (ref 1.85–7.62)
NEUTS SEG NFR BLD AUTO: 71 % (ref 43–75)
NRBC BLD AUTO-RTO: 0 /100 WBCS
OSMOLALITY UR: 434 MMOL/KG
PLATELET # BLD AUTO: 456 THOUSANDS/UL (ref 149–390)
PMV BLD AUTO: 7.9 FL (ref 8.9–12.7)
POTASSIUM SERPL-SCNC: 4.9 MMOL/L (ref 3.5–5.3)
RBC # BLD AUTO: 4.07 MILLION/UL (ref 3.81–5.12)
RHODAMINE-AURAMINE STN SPEC: NORMAL
SODIUM 24H UR-SCNC: 52 MOL/L
SODIUM SERPL-SCNC: 130 MMOL/L (ref 135–147)
T4 FREE SERPL-MCNC: 0.86 NG/DL (ref 0.76–1.46)
TSH SERPL DL<=0.05 MIU/L-ACNC: 7.82 UIU/ML (ref 0.45–4.5)
URATE SERPL-MCNC: 4.4 MG/DL (ref 2–7.5)
WBC # BLD AUTO: 6.5 THOUSAND/UL (ref 4.31–10.16)

## 2022-08-02 PROCEDURE — 84300 ASSAY OF URINE SODIUM: CPT | Performed by: INTERNAL MEDICINE

## 2022-08-02 PROCEDURE — 83935 ASSAY OF URINE OSMOLALITY: CPT | Performed by: INTERNAL MEDICINE

## 2022-08-02 PROCEDURE — 97110 THERAPEUTIC EXERCISES: CPT

## 2022-08-02 PROCEDURE — 85025 COMPLETE CBC W/AUTO DIFF WBC: CPT

## 2022-08-02 PROCEDURE — 99232 SBSQ HOSP IP/OBS MODERATE 35: CPT | Performed by: INTERNAL MEDICINE

## 2022-08-02 PROCEDURE — 99024 POSTOP FOLLOW-UP VISIT: CPT | Performed by: PODIATRIST

## 2022-08-02 PROCEDURE — 80048 BASIC METABOLIC PNL TOTAL CA: CPT

## 2022-08-02 PROCEDURE — 84443 ASSAY THYROID STIM HORMONE: CPT | Performed by: INTERNAL MEDICINE

## 2022-08-02 PROCEDURE — 84439 ASSAY OF FREE THYROXINE: CPT | Performed by: INTERNAL MEDICINE

## 2022-08-02 PROCEDURE — 97530 THERAPEUTIC ACTIVITIES: CPT

## 2022-08-02 PROCEDURE — 84550 ASSAY OF BLOOD/URIC ACID: CPT | Performed by: INTERNAL MEDICINE

## 2022-08-02 RX ORDER — LEVOTHYROXINE SODIUM 0.07 MG/1
37.5 TABLET ORAL DAILY
Status: DISCONTINUED | OUTPATIENT
Start: 2022-08-03 | End: 2022-08-02

## 2022-08-02 RX ORDER — ONDANSETRON 4 MG/1
4 TABLET, ORALLY DISINTEGRATING ORAL ONCE
Status: COMPLETED | OUTPATIENT
Start: 2022-08-02 | End: 2022-08-03

## 2022-08-02 RX ORDER — LEVOTHYROXINE SODIUM 0.07 MG/1
37.5 TABLET ORAL
Status: DISCONTINUED | OUTPATIENT
Start: 2022-08-03 | End: 2022-08-03 | Stop reason: HOSPADM

## 2022-08-02 RX ADMIN — PREDNISONE 5 MG: 5 TABLET ORAL at 08:04

## 2022-08-02 RX ADMIN — OXYCODONE HYDROCHLORIDE 10 MG: 10 TABLET ORAL at 05:58

## 2022-08-02 RX ADMIN — HYDROXYZINE HYDROCHLORIDE 25 MG: 25 TABLET ORAL at 21:19

## 2022-08-02 RX ADMIN — HEPARIN SODIUM 5000 UNITS: 5000 INJECTION INTRAVENOUS; SUBCUTANEOUS at 13:17

## 2022-08-02 RX ADMIN — NORTRIPTYLINE HYDROCHLORIDE 100 MG: 50 CAPSULE ORAL at 21:02

## 2022-08-02 RX ADMIN — OXYCODONE HYDROCHLORIDE 10 MG: 10 TABLET ORAL at 10:18

## 2022-08-02 RX ADMIN — MELATONIN 6 MG: at 21:01

## 2022-08-02 RX ADMIN — Medication 375 MG: at 08:05

## 2022-08-02 RX ADMIN — SENNOSIDES AND DOCUSATE SODIUM 1 TABLET: 8.6; 5 TABLET ORAL at 08:05

## 2022-08-02 RX ADMIN — FERROUS GLUCONATE 324 MG: 324 TABLET ORAL at 08:05

## 2022-08-02 RX ADMIN — OXYCODONE HYDROCHLORIDE 10 MG: 10 TABLET ORAL at 01:48

## 2022-08-02 RX ADMIN — METHOCARBAMOL TABLETS 750 MG: 750 TABLET, COATED ORAL at 08:05

## 2022-08-02 RX ADMIN — HEPARIN SODIUM 5000 UNITS: 5000 INJECTION INTRAVENOUS; SUBCUTANEOUS at 21:01

## 2022-08-02 RX ADMIN — HEPARIN SODIUM 5000 UNITS: 5000 INJECTION INTRAVENOUS; SUBCUTANEOUS at 05:58

## 2022-08-02 RX ADMIN — HYDROXYCHLOROQUINE SULFATE 200 MG: 200 TABLET ORAL at 08:06

## 2022-08-02 RX ADMIN — ACETAMINOPHEN 975 MG: 325 TABLET, FILM COATED ORAL at 13:17

## 2022-08-02 RX ADMIN — OXYCODONE HYDROCHLORIDE 10 MG: 10 TABLET ORAL at 21:00

## 2022-08-02 RX ADMIN — ACETAMINOPHEN 975 MG: 325 TABLET, FILM COATED ORAL at 21:01

## 2022-08-02 RX ADMIN — LEVOTHYROXINE SODIUM 25 MCG: 25 TABLET ORAL at 08:04

## 2022-08-02 RX ADMIN — MULTIPLE VITAMINS W/ MINERALS TAB 1 TABLET: TAB ORAL at 08:04

## 2022-08-02 RX ADMIN — METHOCARBAMOL TABLETS 750 MG: 750 TABLET, COATED ORAL at 16:42

## 2022-08-02 RX ADMIN — ROPINIROLE HYDROCHLORIDE 2 MG: 1 TABLET, FILM COATED ORAL at 21:01

## 2022-08-02 RX ADMIN — FOLIC ACID 2000 MCG: 1 TABLET ORAL at 08:04

## 2022-08-02 RX ADMIN — MELOXICAM 7.5 MG: 7.5 TABLET ORAL at 08:05

## 2022-08-02 NOTE — PROGRESS NOTES
[address] [facility]  Progress Note Nadia Nielsen Colon 1945, 65 y o  female MRN: [id number]  Unit/Bed#: -01 Encounter: [id number]  Primary Care Provider: Waylon Barker MD   Date and time admitted to hospital: 2022  5:59 PM    * Acute osteomyelitis of left foot [facility])  Assessment & Plan  Status post left partial fifth ray resection with flap closure  Mx per primary team , podiatry  Hyponatremia  Assessment & Plan  · Mild  · Na 130  · Unclear etiology, appears euvolemic on exam   · Uric acid normal  · TSh elevated at 7 4, levothyroxine increased  · Check serum and urine osm and urine na  · Trend BMP for now      Rheumatoid arthritis involving multiple sites with positive rheumatoid factor (HCC)  Assessment & Plan  · Continue prednisone 5 mg daily and methotrexate per PTA medication regimen     Hypothyroidism due to Hashimoto's thyroiditis  Assessment & Plan  · TSh elevated  · Increased Levothyroxine to 37 5 mg daily  · Suggest rechecking TSH OP in 4-6 weeks  Jerald  Internal Medicine Consultation Follow Up Progress Note  Patient: Osvaldo Rivera 65 y o  female   MRN: [id number]  PCP: Waylon Barker MD  Unit/Bed#: -18 Encounter: [id number]  Date Of Visit: 22    Primary Service: Pastora Heard DPM    VTE Pharmacologic Prophylaxis:heparin/ sequential compression device    Was care plan discussed with the Primary service today?: Yes    Education and Discussions with Family / Patient: plan of care, patient    Time Spent for Care: 30 minutes  More than 50% of total time spent on counseling and coordination of care as described above  Subjective:   No overnight events  No chest pain, SOB, dizziness, focal weakness or numbness  has been eating well      Objective:     Vitals:   Temp (24hrs), Av 6 °F (36 4 °C), Min:97 5 °F (36 4 °C), Max:97 7 °F (36 5 °C)    Temp:  [97 5 °F (36 4 °C)-97 7 °F (36 5 °C)] 97 5 °F (36 4 °C)  HR:  [70-73] 73  Resp:  [18] 18  BP: ()/(65-91) 108/65  SpO2:  [99 %-100 %] 100 %  Body mass index is 20 55 kg/m²  Input and Output Summary (last 24 hours): Intake/Output Summary (Last 24 hours) at 8/2/2022 1634  Last data filed at 8/2/2022 1100  Gross per 24 hour   Intake 241 ml   Output 600 ml   Net -359 ml       Physical Exam:     Physical Exam  Constitutional:       General: She is not in acute distress  HENT:      Mouth/Throat:      Mouth: Mucous membranes are moist    Cardiovascular:      Rate and Rhythm: Normal rate and regular rhythm  Heart sounds: Normal heart sounds  No murmur heard  Pulmonary:      Effort: No respiratory distress  Breath sounds: Normal breath sounds  No wheezing or rales  Abdominal:      General: Bowel sounds are normal  There is no distension  Palpations: Abdomen is soft  Tenderness: There is no abdominal tenderness  Skin:     General: Skin is warm  Neurological:      Mental Status: She is alert  Comments: Awake alert and communicative           Additional Data:     Results from last 7 days   Lab Units 08/02/22  1328   WBC Thousand/uL 6 50   HEMOGLOBIN g/dL 11 3*   HEMATOCRIT % 37 1   PLATELETS Thousands/uL 456*   NEUTROS PCT % 71   LYMPHS PCT % 14   MONOS PCT % 10   EOS PCT % 2     Results from last 7 days   Lab Units 08/02/22  1328 08/01/22  0505 07/29/22  0452   POTASSIUM mmol/L 4 9   < > 4 0   CHLORIDE mmol/L 98   < > 104   CO2 mmol/L 29   < > 29   BUN mg/dL 21   < > 11   CREATININE mg/dL 0 54*   < > 0 40*   CALCIUM mg/dL 9 1   < > 8 5   ALK PHOS U/L  --   --  94   ALT U/L  --   --  20   AST U/L  --   --  25    < > = values in this interval not displayed  * I Have Reviewed All Lab Data Listed Above  * Additional Pertinent Lab Tests Reviewed: All Labs Within Last 24 Hours Reviewed    Imaging: I have personally reviewed pertinent reports        Last 24 Hours Medication List:   Current Facility-Administered Medications   Medication Dose Route Frequency Provider Last Rate    acetaminophen  975 mg Oral Q8H [address] Alesia Gordillo PA-C      Ascorbic Acid (Vitamin C)  375 mg Oral Daily Deya Desiree, DPM      fentaNYL  25 mcg Intravenous Q3 min PRN Joel Rose CRNA      ferrous gluconate  324 mg Oral Daily Deya Desiree, DPM      folic acid  8,969 mcg Oral Daily [place] Desiree, DPM      heparin (porcine)  5,000 Units Subcutaneous [facility] [place] Desiree, DPM      hydroxychloroquine  200 mg Oral Daily With Breakfast Deya Desiree, DPM      hydrOXYzine HCL  25 mg Oral Q6H PRN Deya Desiree, DPM      [START ON 8/3/2022] levothyroxine  37 5 mcg Oral Early Morning Mohan Bhandari MD      melatonin  6 mg Oral HS [place] Desiree, DPM      meloxicam  7 5 mg Oral Daily Deya Desiree, DPM      methocarbamol  750 mg Oral Q6H PRN Deya Desiree, DPM      methotrexate  2 5 mg Oral Weekly Deya Desiree, DPM      morphine injection  2 mg Intravenous Q4H PRN Olivia Yuridia Gordillo PA-C      multivitamin-minerals  1 tablet Oral Daily [place] Desiree, DPM      naloxone  0 04 mg Intravenous Q1MIN PRN [place] Desiree, DPM      nortriptyline  100 mg Oral HS [place] Desiree, DPM      ondansetron  4 mg Intravenous Q4H PRN [place] Desiree, DPM      oxyCODONE  10 mg Oral Q4H PRN Greg Thompson MD      oxyCODONE  5 mg Oral Q4H PRN Alesia Gordillo PA-C      polyethylene glycol  17 g Oral Daily PRN Deya Desiree, DPM      predniSONE  5 mg Oral Daily [place] Desiree, DPM      rOPINIRole  2 mg Oral HS Deya Desiree, DPM      senna-docusate sodium  1 tablet Oral BID Deya Desiree, DPM          Today, Patient Was Seen By: Mohan Bhandari MD    ** Please Note: This note has been constructed using a voice recognition system   **

## 2022-08-02 NOTE — PROGRESS NOTES
Podiatry - Progress Note  Patient: Monet Botello Colon 65 y o  female   MRN: [id number]  PCP: Yun Damon MD  Unit/Bed#: -24 Encounter: [id number]  Date Of Visit: 22    ASSESSMENT:    Ruben Vieyra is a 65 y o  female with:    1  Left plantar 5th metatarsal head ulceration with underlying osteomyelitis-Hernandez 3-POA  a  S/p left partial 5th ray resection with flap closure DOS: 2022  2  Left leg venous stasis ulcer-POA  3  Chronic venous hypertension  4  Rheumatoid arthritis  5  Hypothyroidism due to Hashimoto's disease  6  Restless legs syndrome  1  PLAN:    · Incision site is stable, with sutures intact and no clinical signs of infection  Patient is stable for d/c  Per CM she has a bed available at [facility]  · Wound dressings changed  Xeroform, 4x4, kerlex and lightly wrapped ACE applied    · Patient negative for COVID   · Appreciate consulting services  · Continue to monitor labs    · Patient states that pain is well controlled  Continue pain management   · Elevation and offloading on green foam wedges or pillows when non-ambulatory  · Appreciate consulting services for recommendations and management  Pharmacologic VTE Prophylaxis: Heparin   Mechanical VTE Prophylaxis: sequential compression device   Weightbearing status: Non-weightbearing left  foot    Disposition:  Patient requires >2 midnight stays for placement     SUBJECTIVE:     The patient was seen, evaluated, and assessed at bedside today  The patient was awake, alert, and in no acute distress  No acute events overnight  The patient reports pain is well controlled with current pain managment  Patient denies N/V/F/chills/SOB/CP        OBJECTIVE:     Vitals:   BP 98/72   Pulse 72   Temp 97 5 °F (36 4 °C)   Resp 15   Ht 4' 10" (1 473 m)   Wt 44 6 kg (98 lb 5 2 oz)   SpO2 99%   BMI 20 55 kg/m²     Temp (24hrs), Av 7 °F (36 5 °C), Min:97 5 °F (36 4 °C), Max:97 9 °F (36 6 °C)      Physical Exam:     Lungs: Non labored breathing  Abdomen: Soft, non-tender  Lower Extremity:  -Motor to toes present   -Denies left calf pain   -sutures intact   -Brisk capillary refill noted to plantar incision site       Clinical Images 08/02/22:          Additional Data:     Labs:    Results from last 7 days   Lab Units 08/01/22  0505   WBC Thousand/uL 6 54   HEMOGLOBIN g/dL 10 5*   HEMATOCRIT % 34 9   PLATELETS Thousands/uL 432*   NEUTROS PCT % 56   LYMPHS PCT % 25   MONOS PCT % 13*   EOS PCT % 3     Results from last 7 days   Lab Units 08/01/22  0505 07/29/22  0452   POTASSIUM mmol/L 4 3 4 0   CHLORIDE mmol/L 103 104   CO2 mmol/L 29 29   BUN mg/dL 18 11   CREATININE mg/dL 0 48* 0 40*   CALCIUM mg/dL 8 5 8 5   ALK PHOS U/L  --  94   ALT U/L  --  20   AST U/L  --  25           * I Have Reviewed All Lab Data Listed Above  Recent Cultures (last 7 days):               Imaging: I have personally reviewed pertinent films in PACS  EKG, Pathology, and Other Studies: I have personally reviewed pertinent reports  ** Please Note: Portions of the record may have been created with voice recognition software  Occasional wrong word or "sound a like" substitutions may have occurred due to the inherent limitations of voice recognition software  Read the chart carefully and recognize, using context, where substitutions have occurred   **

## 2022-08-02 NOTE — ASSESSMENT & PLAN NOTE
· TSh elevated  · Increased Levothyroxine to 37 5 mg daily  · Suggest rechecking TSH OP in 4-6 weeks

## 2022-08-02 NOTE — CASE MANAGEMENT
Case Management Discharge Planning Note    Patient name Anna Crawley  Location /-01 MRN [id number]  : 1945 Date 2022       Current Admission Date: 2022  Current Admission Diagnosis:Acute osteomyelitis of left foot [facility])   Patient Active Problem List    Diagnosis Date Noted    [facility] 20[facility]    Venous insufficiency 2021    Shoulder pain, bilateral 2021    Failed orthopedic implant ([facility] ) 2021    Encounter for annual wellness exam in Medicare patient 2021    S/P reverse total shoulder arthroplasty, left 2021    Rupture long head biceps tendon, left, initial encounter 2021    Acquired subluxation of left shoulder 2021    Depression 2020    Other forms of systemic lupus erythematosus ([facility] ) 2020    Dyspepsia 2019    Vitamin D deficiency 2019    Chronic pain syndrome 2019    Rheumatoid arthritis involving multiple sites with positive rheumatoid factor ([facility] ) 2019    Ambulatory dysfunction 2018    Closed compression fracture of L3 lumbar vertebra 2018    S/P total hip arthroplasty 2017    Anxiety 2017    RLS (restless legs syndrome) 2017    RBBB 2017    Age-related osteoporosis without current pathological fracture 2017    Hypothyroidism due to Hashimoto's thyroiditis 2016      LOS (days): 12  Geometric Mean LOS (GMLOS) (days): 7 60  Days to GMLOS:-4 3     OBJECTIVE:  Risk of Unplanned Readmission Score: 14 55         Current admission status: Inpatient   Preferred Pharmacy:   [address] [place] [address]  Phone: [phone] Fax: [phone]    Primary Care Provider: Ace Mendoza MD    Primary Insurance: MEDICARE  Secondary Insurance:     DISCHARGE DETAILS:    Discharge planning discussed with[de-identified] Roger Thibodeaux  Freedom of Choice: Yes  Comments - Freedom of Choice: BEd at [facility] 8/3/22  CM contacted family/caregiver?: Yes  Were Treatment Team discharge recommendations reviewed with patient/caregiver?: Yes  Did patient/caregiver verbalize understanding of patient care needs?: N/A- going to facility  Were patient/caregiver advised of the risks associated with not following Treatment Team discharge recommendations?: Yes    Contacts  Patient Contacts: Roger Thibodeaux  Relationship to Patient[de-identified] Family  Contact Method: Phone  Phone Number: [phone]  Reason/Outcome: Continuity of Care, Emergency Contact, Discharge Planning              Other Referral/Resources/Interventions Provided:  Interventions: Short Term Rehab         Treatment Team Recommendation: Short Term Rehab                                   IMM Given (Date):: 08/02/22  IMM Given to[de-identified] Family  Family notified<MASON> Roger Thibodeaux

## 2022-08-02 NOTE — PLAN OF CARE
Problem: INFECTION - ADULT  Goal: Absence or prevention of progression during hospitalization  Description: INTERVENTIONS:  - Assess and monitor for signs and symptoms of infection  - Monitor lab/diagnostic results  - Monitor all insertion sites, i e  indwelling lines, tubes, and drains  - Monitor endotracheal if appropriate and nasal secretions for changes in amount and color  - [place] appropriate cooling/warming therapies per order  - Administer medications as ordered  - Instruct and encourage patient and family to use good hand hygiene technique  - Identify and instruct in appropriate isolation precautions for identified infection/condition  Outcome: Progressing     Problem: SAFETY ADULT  Goal: Patient will remain free of falls  Description: INTERVENTIONS:  - Educate patient/family on patient safety including physical limitations  - Instruct patient to call for assistance with activity   - Consult OT/PT to assist with strengthening/mobility   - Keep Call bell within reach  - Keep bed low and locked with side rails adjusted as appropriate  - Keep care items and personal belongings within reach  - Initiate and maintain comfort rounds  - Make Fall Risk Sign visible to staff  - Offer Toileting every **2* Hours, in advance of need  - Initiate/Maintain *bed**alarm  - Apply yellow socks and bracelet for high fall risk patients  - Consider moving patient to room near nurses station  Outcome: Progressing     Problem: DISCHARGE PLANNING  Goal: Discharge to home or other facility with appropriate resources  Description: INTERVENTIONS:  - Identify barriers to discharge w/patient and caregiver  - Arrange for needed discharge resources and transportation as appropriate  - Identify discharge learning needs (meds, wound care, etc )  - Arrange for interpretive services to assist at discharge as needed  - Refer to Case Management Department for coordinating discharge planning if the patient needs post-hospital services based on physician/advanced practitioner order or complex needs related to functional status, cognitive ability, or social support system  Outcome: Progressing

## 2022-08-02 NOTE — ASSESSMENT & PLAN NOTE
· Mild  · Na 130  · Unclear etiology, appears euvolemic on exam   · Uric acid normal  · TSh elevated at 7 4, levothyroxine increased  · Check serum and urine osm and urine na  · Trend BMP for now

## 2022-08-02 NOTE — CASE MANAGEMENT
Case Management Discharge Planning Note    Patient name Marko Summers  Location /-01 MRN [id number]  : 1945 Date 2022       Current Admission Date: 2022  Current Admission Diagnosis:Acute osteomyelitis of left foot [facility])   Patient Active Problem List    Diagnosis Date Noted    [facility] 20[facility]    Venous insufficiency 2021    Shoulder pain, bilateral 2021    Failed orthopedic implant ([facility] ) 2021    Encounter for annual wellness exam in Medicare patient 2021    S/P reverse total shoulder arthroplasty, left 2021    Rupture long head biceps tendon, left, initial encounter 2021    Acquired subluxation of left shoulder 2021    Depression 2020    Other forms of systemic lupus erythematosus ([facility] ) 2020    Dyspepsia 2019    Vitamin D deficiency 2019    Chronic pain syndrome 2019    Rheumatoid arthritis involving multiple sites with positive rheumatoid factor ([facility] ) 2019    Ambulatory dysfunction 2018    Closed compression fracture of L3 lumbar vertebra 2018    S/P total hip arthroplasty 2017    Anxiety 2017    RLS (restless legs syndrome) 2017    RBBB 2017    Age-related osteoporosis without current pathological fracture 2017    Hypothyroidism due to Hashimoto's thyroiditis 2016      LOS (days): 12  Geometric Mean LOS (GMLOS) (days): 7 60  Days to GMLOS:-4 3     OBJECTIVE:  Risk of Unplanned Readmission Score: 14 55         Current admission status: Inpatient   Preferred Pharmacy:   [address]  [address] [place] [address]  Phone: [phone] Fax: [phone]    Primary Care Provider: Tina Loya MD    Primary Insurance: MEDICARE  Secondary Insurance:     DISCHARGE DETAILS:    Discharge planning discussed with[de-identified] Castillo Mims  Freedom of Choice: Yes  Comments - Freedom of Choice: BEd at [facility] 8/3/22  CM contacted family/caregiver?: Yes  Were Treatment Team discharge recommendations reviewed with patient/caregiver?: Yes  Did patient/caregiver verbalize understanding of patient care needs?: N/A- going to facility  Were patient/caregiver advised of the risks associated with not following Treatment Team discharge recommendations?: Yes    Contacts  Patient Contacts: Castillo Mims  Relationship to Patient[de-identified] Family  Contact Method: Phone  Phone Number: [phone]  Reason/Outcome: Continuity of Care, Emergency Contact, Discharge Planning              Other Referral/Resources/Interventions Provided:  Interventions: Short Term Rehab         Treatment Team Recommendation: Short Term Rehab

## 2022-08-02 NOTE — PLAN OF CARE
Problem: PAIN - ADULT  Goal: Verbalizes/displays adequate comfort level or baseline comfort level  Description: Interventions:  - Encourage patient to monitor pain and request assistance  - Assess pain using appropriate pain scale  - Administer analgesics based on type and severity of pain and evaluate response  - Implement non-pharmacological measures as appropriate and evaluate response  - Consider cultural and social influences on pain and pain management  - Notify physician/advanced practitioner if interventions unsuccessful or patient reports new pain  Outcome: Progressing     Problem: INFECTION - ADULT  Goal: Absence or prevention of progression during hospitalization  Description: INTERVENTIONS:  - Assess and monitor for signs and symptoms of infection  - Monitor lab/diagnostic results  - Monitor all insertion sites, i e  indwelling lines, tubes, and drains  - Monitor endotracheal if appropriate and nasal secretions for changes in amount and color  - [place] appropriate cooling/warming therapies per order  - Administer medications as ordered  - Instruct and encourage patient and family to use good hand hygiene technique  - Identify and instruct in appropriate isolation precautions for identified infection/condition  Outcome: Progressing  Goal: Absence of fever/infection during neutropenic period  Description: INTERVENTIONS:  - Monitor WBC    Outcome: Progressing     Problem: SAFETY ADULT  Goal: Patient will remain free of falls  Description: INTERVENTIONS:  - Educate patient/family on patient safety including physical limitations  - Instruct patient to call for assistance with activity   - Consult OT/PT to assist with strengthening/mobility   - Keep Call bell within reach  - Keep bed low and locked with side rails adjusted as appropriate  - Keep care items and personal belongings within reach  - Initiate and maintain comfort rounds  Outcome: Progressing  Goal: Maintain or return to baseline ADL function  Description: INTERVENTIONS:  -  Assess patient's ability to carry out ADLs; assess patient's baseline for ADL function and identify physical deficits which impact ability to perform ADLs (bathing, care of mouth/teeth, toileting, grooming, dressing, etc )  - Assess/evaluate cause of self-care deficits   - Assess range of motion  - Assess patient's mobility; develop plan if impaired  - Assess patient's need for assistive devices and provide as appropriate  - Encourage maximum independence but intervene and supervise when necessary  - Involve family in performance of ADLs  - Assess for home care needs following discharge   - Consider OT consult to assist with ADL evaluation and planning for discharge  - Provide patient education as appropriate  Outcome: Progressing  Goal: Maintains/Returns to pre admission functional level  Description: INTERVENTIONS:  - Perform BMAT or MOVE assessment daily    - Set and communicate daily mobility goal to care team and patient/family/caregiver     - Collaborate with rehabilitation services on mobility goals if consulted  - Record patient progress and toleration of activity level   Outcome: Progressing     Problem: DISCHARGE PLANNING  Goal: Discharge to home or other facility with appropriate resources  Description: INTERVENTIONS:  - Identify barriers to discharge w/patient and caregiver  - Arrange for needed discharge resources and transportation as appropriate  - Identify discharge learning needs (meds, wound care, etc )  - Arrange for interpretive services to assist at discharge as needed  - Refer to Case Management Department for coordinating discharge planning if the patient needs post-hospital services based on physician/advanced practitioner order or complex needs related to functional status, cognitive ability, or social support system  Outcome: Progressing     Problem: Knowledge Deficit  Goal: Patient/family/caregiver demonstrates understanding of disease process, treatment plan, medications, and discharge instructions  Description: Complete learning assessment and assess knowledge base  Interventions:  - Provide teaching at level of understanding  - Provide teaching via preferred learning methods  Outcome: Progressing     Problem: MOBILITY - ADULT  Goal: Maintain or return to baseline ADL function  Description: INTERVENTIONS:  -  Assess patient's ability to carry out ADLs; assess patient's baseline for ADL function and identify physical deficits which impact ability to perform ADLs (bathing, care of mouth/teeth, toileting, grooming, dressing, etc )  - Assess/evaluate cause of self-care deficits   - Assess range of motion  - Assess patient's mobility; develop plan if impaired  - Assess patient's need for assistive devices and provide as appropriate  - Encourage maximum independence but intervene and supervise when necessary  - Involve family in performance of ADLs  - Assess for home care needs following discharge   - Consider OT consult to assist with ADL evaluation and planning for discharge  - Provide patient education as appropriate  Outcome: Progressing  Goal: Maintains/Returns to pre admission functional level  Description: INTERVENTIONS:  - Perform BMAT or MOVE assessment daily    - Set and communicate daily mobility goal to care team and patient/family/caregiver     - Record patient progress and toleration of activity level   Outcome: Progressing     Problem: Prexisting or High Potential for Compromised Skin Integrity  Goal: Skin integrity is maintained or improved  Description: INTERVENTIONS:  - Identify patients at risk for skin breakdown  - Assess and monitor skin integrity  - Assess and monitor nutrition and hydration status  - Monitor labs   - Assess for incontinence   - Turn and reposition patient  - Assist with mobility/ambulation  - Relieve pressure over bony prominences  - Avoid friction and shearing  - Provide appropriate hygiene as needed including keeping skin clean and dry  - Evaluate need for skin moisturizer/barrier cream  - Collaborate with interdisciplinary team   - Patient/family teaching  - Consider wound care consult   Outcome: Progressing     Problem: Nutrition/Hydration-ADULT  Goal: Nutrient/Hydration intake appropriate for improving, restoring or maintaining nutritional needs  Description: Monitor and assess patient's nutrition/hydration status for malnutrition  Collaborate with interdisciplinary team and initiate plan and interventions as ordered  Monitor patient's weight and dietary intake as ordered or per policy  Utilize nutrition screening tool and intervene as necessary  Determine patient's food preferences and provide high-protein, high-caloric foods as appropriate       INTERVENTIONS:  - Monitor oral intake, urinary output, labs, and treatment plans  - Assess nutrition and hydration status and recommend course of action  - Evaluate amount of meals eaten  - Assist patient with eating if necessary   - Allow adequate time for meals  - Recommend/ encourage appropriate diets, oral nutritional supplements, and vitamin/mineral supplements  - Order, calculate, and assess calorie counts as needed  - Recommend, monitor, and adjust tube feedings and TPN/PPN based on assessed needs  - Assess need for intravenous fluids  - Provide specific nutrition/hydration education as appropriate  - Include patient/family/caregiver in decisions related to nutrition  Outcome: Progressing     Problem: Potential for Falls  Goal: Patient will remain free of falls  Description: INTERVENTIONS:  - Educate patient/family on patient safety including physical limitations  - Instruct patient to call for assistance with activity   - Consult OT/PT to assist with strengthening/mobility   - Keep Call bell within reach  - Keep bed low and locked with side rails adjusted as appropriate  - Consider moving patient to room near nurses station  Outcome: Progressing

## 2022-08-02 NOTE — PLAN OF CARE
Problem: PHYSICAL THERAPY ADULT  Goal: Performs mobility at highest level of function for planned discharge setting  See evaluation for individualized goals  Description: Treatment/Interventions: Functional transfer training, LE strengthening/ROM, Therapeutic exercise, Endurance training, Equipment eval/education, Bed mobility, Spoke to nursing, OT  Equipment Recommended: Other (Comment) (r/o rw during follow up visits)       See flowsheet documentation for full assessment, interventions and recommendations  Outcome: Progressing  Note: Prognosis: Fair  Problem List: Decreased strength, Decreased endurance, Impaired balance, Decreased mobility, Pain  Assessment: Pt seen for PT session w/ focus on bed mobility training, slide board t/f training, + ther ex instruction  Pt did demonstrate progress this session as she required mod assistx1 for bed mobility + was able to slide board t/f w/ assistx1  Pt pleased w/ progress, motivated to go to rehab to get stronger  Barriers to Discharge: Inaccessible home environment, Decreased caregiver support     PT Discharge Recommendation: Post acute rehabilitation services    See flowsheet documentation for full assessment

## 2022-08-02 NOTE — PHYSICAL THERAPY NOTE
Physical Therapy Treatment Note    Patient's Name: Brandee Rivera  : 22 1504   PT Last Visit   PT Visit Date 22   Note Type   Note Type Treatment   Pain Assessment   Pain Assessment Tool 0-10   Pain Score 2   Pain Location/Orientation Orientation: Left; Location: [facility]   Hospital Pain Intervention(s) Elevated; Emotional support   Restrictions/Precautions   Weight Bearing Precautions Per Order Yes   LLE Weight Bearing Per Order (S)  NWB   Other Precautions WBS; Fall Risk;Pain   General   Chart Reviewed Yes   Response to Previous Treatment Patient with no complaints from previous session  Family/Caregiver Present No   Subjective   Subjective Pt agreeable to mobilize  Bed Mobility   Supine to Sit 3  Moderate assistance   Additional items Assist x 1; Increased time required;Verbal cues   Sit to Supine Unable to assess   Additional Comments Pt greeted in supine  Transfers   Sliding Board transfer 2  Maximal assistance   Additional items Assist x 1; Increased time required;Verbal cues   Ambulation/Elevation   Gait pattern Not appropriate; Not tested   Balance   Static Sitting Fair   Dynamic Sitting Fair -   Endurance Deficit   Endurance Deficit Yes   Activity Tolerance   Activity Tolerance Patient tolerated treatment well   Exercises   Hip Abduction Sitting;20 reps;AROM; Bilateral   Hip Adduction Sitting;20 reps;AROM; Bilateral   Knee AROM Long Arc Quad Sitting;20 reps;AROM; Bilateral   Marching Sitting;20 reps;AROM; Bilateral   Assessment   Prognosis Fair   Problem List Decreased strength;Decreased endurance; Impaired balance;Decreased mobility;Pain   Assessment Pt seen for PT session w/ focus on bed mobility training, slide board t/f training, + ther ex instruction  Pt did demonstrate progress this session as she required mod assistx1 for bed mobility + was able to slide board t/f w/ assistx1  Pt pleased w/ progress, motivated to go to rehab to get stronger     Barriers to Discharge Inaccessible home environment;Decreased caregiver support   Goals   Patient Goals get moving   PT Treatment Day 3   Plan   Treatment/Interventions Functional transfer training;LE strengthening/ROM; Therapeutic exercise;Patient/family training;Equipment eval/education; Bed mobility; Compensatory technique education  (balance training)   Progress Progressing toward goals   PT Frequency 3-5x/wk   Recommendation   PT Discharge Recommendation Post acute rehabilitation services   AM-PAC Basic Mobility Inpatient   Turning in Bed Without Bedrails 3   Lying on Back to Sitting on Edge of Flat Bed 2   Moving Bed to Chair 2   Standing Up From Chair 1   Walk in Room 1   Climb 3-5 Stairs 1   Basic Mobility Inpatient Raw Score 10   Highest Level Of Mobility   JH-HLM Goal 4: Move to chair/commode   JH-HLM Achieved 4: Move to chair/commode   Education   Education Provided Mobility training;Home exercise program   Patient Demonstrates acceptance/verbal understanding;Reinforcement needed   End of Consult   Patient Position at End of Consult Bedside chair; All needs within reach  (on waffle cushion, BLE elevated, B heels offloaded)     Rolo Valadez, PT, DPT

## 2022-08-02 NOTE — CASE MANAGEMENT
Case Management Discharge Planning Note    Patient name Fran Cortez  Location /-01 MRN [id number]  : 1945 Date 2022       Current Admission Date: 2022  Current Admission Diagnosis:Acute osteomyelitis of left foot [facility])   Patient Active Problem List    Diagnosis Date Noted    Acute osteomyelitis of left foot ([email or web address] ) 2022    Venous insufficiency 2021    Shoulder pain, bilateral 2021    Failed orthopedic implant ([facility] ) 2021    Encounter for annual wellness exam in Medicare patient 2021    S/P reverse total shoulder arthroplasty, left 2021    Rupture long head biceps tendon, left, initial encounter 2021    Acquired subluxation of left shoulder 2021    Depression 2020    Other forms of systemic lupus erythematosus ([facility] ) 2020    Dyspepsia 2019    Vitamin D deficiency 2019    Chronic pain syndrome 2019    Rheumatoid arthritis involving multiple sites with positive rheumatoid factor ([facility] ) 2019    Ambulatory dysfunction 2018    Closed compression fracture of L3 lumbar vertebra 2018    S/P total hip arthroplasty 2017    Anxiety 2017    RLS (restless legs syndrome) 2017    RBBB 2017    Age-related osteoporosis without current pathological fracture 2017    Hypothyroidism due to Hashimoto's thyroiditis 2016      LOS (days): 12  Geometric Mean LOS (GMLOS) (days): 7 60  Days to GMLOS:-4 3     OBJECTIVE:  Risk of Unplanned Readmission Score: 14 58         Current admission status: Inpatient   Preferred Pharmacy:   [address] [place] [address]  Phone: [phone] Fax: [phone]    Primary Care Provider: Dorisann Goltz, MD    Primary Insurance: MEDICARE  Secondary Insurance:     DISCHARGE DETAILS:    Discharge planning discussed with[de-identified] Gutierrez Rodgers  Freedom of Choice: Yes  Comments - Freedom of Choice: BEd at [address] 8/3/22  CM contacted family/caregiver?: Yes  Were Treatment Team discharge recommendations reviewed with patient/caregiver?: Yes  Did patient/caregiver verbalize understanding of patient care needs?: N/A- going to facility  Were patient/caregiver advised of the risks associated with not following Treatment Team discharge recommendations?: Yes    Contacts  Patient Contacts: Gutierrez Rodgers  Relationship to Patient[de-identified] Family  Contact Method: Phone  Phone Number: [phone]  Reason/Outcome: Continuity of Care, Emergency Contact, Discharge Planning              Other Referral/Resources/Interventions Provided:  Interventions: Short Term Rehab         Treatment Team Recommendation: Short Term Rehab                                   IMM Given (Date):: 08/02/22  IMM Given to[de-identified] Family  Family notified[de-identified] Gutierrez Rodgers  Additional Comments: Submitted to round trip for d/c to SNF

## 2022-08-03 VITALS
BODY MASS INDEX: 20.64 KG/M2 | OXYGEN SATURATION: 95 % | RESPIRATION RATE: 18 BRPM | DIASTOLIC BLOOD PRESSURE: 60 MMHG | HEIGHT: 58 IN | HEART RATE: 73 BPM | WEIGHT: 98.33 LBS | TEMPERATURE: 97.8 F | SYSTOLIC BLOOD PRESSURE: 106 MMHG

## 2022-08-03 PROBLEM — M86.172 ACUTE OSTEOMYELITIS OF LEFT FOOT (HCC): Status: RESOLVED | Noted: 2022-08-02 | Resolved: 2022-08-03

## 2022-08-03 PROBLEM — M86.9 OSTEOMYELITIS OF LEFT FOOT (HCC): Status: ACTIVE | Noted: 2022-08-02

## 2022-08-03 PROBLEM — D64.9 ANEMIA: Status: ACTIVE | Noted: 2022-08-03

## 2022-08-03 LAB
ANION GAP SERPL CALCULATED.3IONS-SCNC: 4 MMOL/L (ref 4–13)
BUN SERPL-MCNC: 21 MG/DL (ref 5–25)
CALCIUM SERPL-MCNC: 9.1 MG/DL (ref 8.3–10.1)
CHLORIDE SERPL-SCNC: 98 MMOL/L (ref 96–108)
CO2 SERPL-SCNC: 29 MMOL/L (ref 21–32)
CREAT SERPL-MCNC: 0.46 MG/DL (ref 0.6–1.3)
GFR SERPL CREATININE-BSD FRML MDRD: 95 ML/MIN/1.73SQ M
GLUCOSE SERPL-MCNC: 83 MG/DL (ref 65–140)
OSMOLALITY UR/SERPL-RTO: 278 MMOL/KG (ref 282–298)
POTASSIUM SERPL-SCNC: 4.5 MMOL/L (ref 3.5–5.3)
SODIUM SERPL-SCNC: 131 MMOL/L (ref 135–147)

## 2022-08-03 PROCEDURE — 99232 SBSQ HOSP IP/OBS MODERATE 35: CPT | Performed by: INTERNAL MEDICINE

## 2022-08-03 PROCEDURE — 80048 BASIC METABOLIC PNL TOTAL CA: CPT | Performed by: INTERNAL MEDICINE

## 2022-08-03 PROCEDURE — 83930 ASSAY OF BLOOD OSMOLALITY: CPT | Performed by: INTERNAL MEDICINE

## 2022-08-03 RX ORDER — OXYCODONE HYDROCHLORIDE 10 MG/1
10 TABLET ORAL EVERY 4 HOURS PRN
Qty: 20 TABLET | Refills: 0 | Status: SHIPPED | OUTPATIENT
Start: 2022-08-03 | End: 2022-08-08

## 2022-08-03 RX ORDER — DOXYCYCLINE HYCLATE 50 MG/1
324 CAPSULE, GELATIN COATED ORAL DAILY
Qty: 90 TABLET | Refills: 0
Start: 2022-08-04 | End: 2022-11-02

## 2022-08-03 RX ORDER — AMOXICILLIN 250 MG
1 CAPSULE ORAL 2 TIMES DAILY
Qty: 20 TABLET | Refills: 0
Start: 2022-08-03

## 2022-08-03 RX ORDER — LEVOTHYROXINE SODIUM 0.07 MG/1
37.5 TABLET ORAL
Qty: 60 TABLET | Refills: 0
Start: 2022-08-04 | End: 2022-10-12

## 2022-08-03 RX ADMIN — FOLIC ACID 2000 MCG: 1 TABLET ORAL at 09:09

## 2022-08-03 RX ADMIN — OXYCODONE HYDROCHLORIDE 10 MG: 10 TABLET ORAL at 05:53

## 2022-08-03 RX ADMIN — ONDANSETRON 4 MG: 4 TABLET, ORALLY DISINTEGRATING ORAL at 00:06

## 2022-08-03 RX ADMIN — Medication 375 MG: at 09:10

## 2022-08-03 RX ADMIN — METHOCARBAMOL TABLETS 750 MG: 750 TABLET, COATED ORAL at 09:09

## 2022-08-03 RX ADMIN — LEVOTHYROXINE SODIUM 37.5 MCG: 75 TABLET ORAL at 05:52

## 2022-08-03 RX ADMIN — SENNOSIDES AND DOCUSATE SODIUM 1 TABLET: 8.6; 5 TABLET ORAL at 09:09

## 2022-08-03 RX ADMIN — MELOXICAM 7.5 MG: 7.5 TABLET ORAL at 09:10

## 2022-08-03 RX ADMIN — ACETAMINOPHEN 975 MG: 325 TABLET, FILM COATED ORAL at 05:52

## 2022-08-03 RX ADMIN — OXYCODONE HYDROCHLORIDE 10 MG: 10 TABLET ORAL at 01:26

## 2022-08-03 RX ADMIN — HYDROXYCHLOROQUINE SULFATE 200 MG: 200 TABLET ORAL at 09:11

## 2022-08-03 RX ADMIN — HEPARIN SODIUM 5000 UNITS: 5000 INJECTION INTRAVENOUS; SUBCUTANEOUS at 05:53

## 2022-08-03 RX ADMIN — PREDNISONE 5 MG: 5 TABLET ORAL at 09:09

## 2022-08-03 RX ADMIN — OXYCODONE HYDROCHLORIDE 10 MG: 10 TABLET ORAL at 12:58

## 2022-08-03 RX ADMIN — MULTIPLE VITAMINS W/ MINERALS TAB 1 TABLET: TAB ORAL at 09:08

## 2022-08-03 RX ADMIN — FERROUS GLUCONATE 324 MG: 324 TABLET ORAL at 09:10

## 2022-08-03 NOTE — ASSESSMENT & PLAN NOTE
· Mild  · Na 130>  131  · Suspect SIADH,  , appears euvolemic on exam   · Uric acid normal  · Urine sodium of 52  · TSh elevated at 7 4, levothyroxine increased  · Fluid restriction to 1800 cc  Suggest repeat BMP at rehab

## 2022-08-03 NOTE — PROGRESS NOTES
[address] [facility]  Progress Note Yoav Nolan Colon 1945, 65 y o  female MRN: [id number]  Unit/Bed#: -01 Encounter: [id number]  Primary Care Provider: Tina Loya MD   Date and time admitted to hospital: 2022  5:59 PM    Hyponatremia  Assessment & Plan  · Mild  · Na 130>  131  · Suspect SIADH,  , appears euvolemic on exam   · Uric acid normal  · Urine sodium of 52  · TSh elevated at 7 4, levothyroxine increased  · Fluid restriction to 1800 cc  Suggest repeat BMP at rehab  Rheumatoid arthritis involving multiple sites with positive rheumatoid factor (HCC)  Assessment & Plan  · Continue prednisone 5 mg daily and methotrexate per PTA medication regimen     Hypothyroidism due to Hashimoto's thyroiditis  Assessment & Plan  · TSh elevated  · Increased Levothyroxine to 37 5 mg daily  · Suggest rechecking TSH OP in 4-6 weeks  Anxiety  Assessment & Plan  · Mood appears stable  · Would avoid benzos if possible         [facility] Internal Medicine Consultation Follow Up Progress Note  Patient: Marlin Jin Colon 65 y o  female   MRN: [id number]  PCP: Tina Loya MD  Unit/Bed#: -69 Encounter: [id number]  Date Of Visit: 22    Primary Service: No att  providers found  Reason for Consultation:  Medical management    Assessment & Plan:    · As above      Was care plan discussed with the Primary service today?: Yes    Education and Discussions with Family / Patient:  Discussed plan of care with the patient    Time Spent for Care: 30 minutes  More than 50% of total time spent on counseling and coordination of care as described above  Is patient acceptable for discharge from medicine standpoint?: Yes    Subjective:   No overnight events reported  Patient with mild lower extremity discomfort  Planned for discharge today      Objective:     Vitals:   Temp (24hrs), Av 7 °F (36 5 °C), Min:97 6 °F (36 4 °C), Max:97 8 °F (36 6 °C)    Temp: [97 6 °F (36 4 °C)-97 8 °F (36 6 °C)] 97 8 °F (36 6 °C)  HR:  [73] 73  BP: (106-108)/(60-63) 106/60  SpO2:  [95 %] 95 %  Body mass index is 20 55 kg/m²  Input and Output Summary (last 24 hours): Intake/Output Summary (Last 24 hours) at 8/3/2022 1639  Last data filed at 8/3/2022 0900  Gross per 24 hour   Intake 240 ml   Output 1100 ml   Net -860 ml       Physical Exam:     Physical Exam  Constitutional:       General: She is not in acute distress  HENT:      Mouth/Throat:      Mouth: Mucous membranes are moist    Cardiovascular:      Rate and Rhythm: Normal rate and regular rhythm  Heart sounds: Normal heart sounds  No murmur heard  Pulmonary:      Effort: No respiratory distress  Breath sounds: Normal breath sounds  No wheezing or rales  Abdominal:      General: Bowel sounds are normal  There is no distension  Palpations: Abdomen is soft  Tenderness: There is no abdominal tenderness  Skin:     General: Skin is warm  Neurological:      Mental Status: She is alert  Comments: Awake alert and communicative     Additional Data:     Results from last 7 days   Lab Units 08/02/22  1328   WBC Thousand/uL 6 50   HEMOGLOBIN g/dL 11 3*   HEMATOCRIT % 37 1   PLATELETS Thousands/uL 456*   NEUTROS PCT % 71   LYMPHS PCT % 14   MONOS PCT % 10   EOS PCT % 2     Results from last 7 days   Lab Units 08/03/22  0508 08/01/22  0505 07/29/22  0452   POTASSIUM mmol/L 4 5   < > 4 0   CHLORIDE mmol/L 98   < > 104   CO2 mmol/L 29   < > 29   BUN mg/dL 21   < > 11   CREATININE mg/dL 0 46*   < > 0 40*   CALCIUM mg/dL 9 1   < > 8 5   ALK PHOS U/L  --   --  94   ALT U/L  --   --  20   AST U/L  --   --  25    < > = values in this interval not displayed  * I Have Reviewed All Lab Data Listed Above  * Additional Pertinent Lab Tests Reviewed: All Labs Within Last 24 Hours Reviewed    Imaging: I have personally reviewed pertinent reports        Last 24 Hours Medication List:        Today, Patient Was Seen By: Brittany Schilling MD    ** Please Note: This note has been constructed using a voice recognition system   **

## 2022-08-03 NOTE — PLAN OF CARE
Problem: PAIN - ADULT  Goal: Verbalizes/displays adequate comfort level or baseline comfort level  Description: Interventions:  - Encourage patient to monitor pain and request assistance  - Assess pain using appropriate pain scale  - Administer analgesics based on type and severity of pain and evaluate response  - Implement non-pharmacological measures as appropriate and evaluate response  - Consider cultural and social influences on pain and pain management  - Notify physician/advanced practitioner if interventions unsuccessful or patient reports new pain  Outcome: Progressing     Problem: INFECTION - ADULT  Goal: Absence or prevention of progression during hospitalization  Description: INTERVENTIONS:  - Assess and monitor for signs and symptoms of infection  - Monitor lab/diagnostic results  - Monitor all insertion sites, i e  indwelling lines, tubes, and drains  - Monitor endotracheal if appropriate and nasal secretions for changes in amount and color  - Ira appropriate cooling/warming therapies per order  - Administer medications as ordered  - Instruct and encourage patient and family to use good hand hygiene technique  - Identify and instruct in appropriate isolation precautions for identified infection/condition  Outcome: Progressing     Problem: SAFETY ADULT  Goal: Patient will remain free of falls  Description: INTERVENTIONS:  - Educate patient/family on patient safety including physical limitations  - Instruct patient to call for assistance with activity   - Consult OT/PT to assist with strengthening/mobility   - Keep Call bell within reach  - Keep bed low and locked with side rails adjusted as appropriate  - Keep care items and personal belongings within reach  - Initiate and maintain comfort rounds  - Make Fall Risk Sign visible to staff  - Offer Toileting every  Hours, in advance of need  - Initiate/Maintain alarm  - Obtain necessary fall risk management equipment:   - Apply yellow socks and bracelet for high fall risk patients  - Consider moving patient to room near nurses station  Outcome: Progressing     Problem: Potential for Falls  Goal: Patient will remain free of falls  Description: INTERVENTIONS:  - Educate patient/family on patient safety including physical limitations  - Instruct patient to call for assistance with activity   - Consult OT/PT to assist with strengthening/mobility   - Keep Call bell within reach  - Keep bed low and locked with side rails adjusted as appropriate  - Keep care items and personal belongings within reach  - Initiate and maintain comfort rounds  - Make Fall Risk Sign visible to staff  - Offer Toileting every  Hours, in advance of need  - Initiate/Maintain alarm  - Obtain necessary fall risk management equipment:   - Apply yellow socks and bracelet for high fall risk patients  - Consider moving patient to room near nurses station  Outcome: Progressing

## 2022-08-03 NOTE — CASE MANAGEMENT
Case Management Discharge Planning Note    Patient name Taylor Funez  Location /-01 MRN [id number]  : 1945 Date 8/3/2022       Current Admission Date: 2022  Current Admission Diagnosis:Acute osteomyelitis of left foot [facility])   Patient Active Problem List    Diagnosis Date Noted    [facility]    Hyponatremia 2022    Venous insufficiency 2021    Shoulder pain, bilateral 2021    Failed orthopedic implant ([facility] ) 2021    Encounter for annual wellness exam in Medicare patient 2021    S/P reverse total shoulder arthroplasty, left 2021    Rupture long head biceps tendon, left, initial encounter 2021    Acquired subluxation of left shoulder 2021    Depression 2020    Other forms of systemic lupus erythematosus [facility] 2020    Dyspepsia 2019    Vitamin D deficiency 2019    Chronic pain syndrome 2019    Rheumatoid arthritis involving multiple sites with positive rheumatoid factor ([facility] ) 2019    Ambulatory dysfunction 2018    Closed compression fracture of L3 lumbar vertebra 2018    S/P total hip arthroplasty 2017    Anxiety 2017    RLS (restless legs syndrome) 2017    RBBB 2017    Age-related osteoporosis without current pathological fracture 2017    Hypothyroidism due to Hashimoto's thyroiditis 2016      LOS (days): 13  Geometric Mean LOS (GMLOS) (days): 7 60  Days to GMLOS:-5 1     OBJECTIVE:  Risk of Unplanned Readmission Score: 14 73         Current admission status: Inpatient   Preferred Pharmacy:   [address] [place] [address]  Phone: [phone] Fax: [phone]    Primary Care Provider: Tim Hernandez MD    Primary Insurance: MEDICARE  Secondary Insurance:     DISCHARGE DETAILS:     Hale Camps (family)  updated on d/c time to [address]      Accepting Facility Name, [address] [facility] : [facility]  Receiving Facility/Agency Phone Number: [phone]  Facility/Agency Fax Number: [phone]

## 2022-08-04 ENCOUNTER — TRANSITIONAL CARE MANAGEMENT (OUTPATIENT)
Dept: FAMILY MEDICINE CLINIC | Facility: CLINIC | Age: 77
End: 2022-08-04

## 2022-08-09 ENCOUNTER — TELEPHONE (OUTPATIENT)
Dept: PODIATRY | Facility: CLINIC | Age: 77
End: 2022-08-09

## 2022-08-09 ENCOUNTER — NURSING HOME VISIT (OUTPATIENT)
Dept: WOUND CARE | Facility: HOSPITAL | Age: 77
End: 2022-08-09
Payer: MEDICARE

## 2022-08-09 DIAGNOSIS — R26.2 AMBULATORY DYSFUNCTION: ICD-10-CM

## 2022-08-09 DIAGNOSIS — L89.894 PRESSURE INJURY OF LEFT FOOT, STAGE 4 (HCC): Primary | ICD-10-CM

## 2022-08-09 DIAGNOSIS — R52 PAIN: ICD-10-CM

## 2022-08-09 PROCEDURE — 99308 SBSQ NF CARE LOW MDM 20: CPT | Performed by: NURSE PRACTITIONER

## 2022-08-09 NOTE — PROGRESS NOTES
[address] MANAGEMENT   AND HYPERBARIC MEDICINE CENTER       Patient ID: Maryuri Lema is a 65 y o  female Date of Birth 1945     Location of Service: [facility]    Performed wound round with: Wound team     Chief Complaint : Left foot plantar    Wound Instructions:  Wound:  Left foot plantar  Discontinue previous wound order  Cleanse the wound bed with NSS   Apply non-sting skin prep to periwound area  Apply xerofoam to wound bed, then cover with ABD and kerlix, ace wrap  Frequency : daily and prn for soiling  NWB on the left foot   Offload all wounds  Turn and reposition frequently,   Increase protein intake  Monitor for any sign of infection or worsening, inform PCP or patient's primary physician in your facility  Allergies  Patient has no known allergies  Assessment & Plan:  1  Pressure injury of left foot, stage 4 [facility])  Assessment & Plan:  Left foot  - Onset - June 2022  - s/p Left foot 5th metatarsal head resection, with bilobed flap for skin closure on 7/24/2022  - sutured, intact, no drainage, no obvious sign of infection  - local wound care - xerofoam  - continue to follow up with Dr Alvaro Mix   - follow up next week      2  Ambulatory dysfunction  Assessment & Plan:  On STR      3  Pain  Assessment & Plan:  Patient is started on oxycodone for pain  Subjective: This is a 65 y o , female referred to our service for wound/ skin alterations on left foot plantar  Patient have a complex medical history including but not limited to  has a past medical history of Acute blood loss anemia, Aftercare following joint replacement, Anxiety, Arthritis, Depression, Disease of thyroid gland, Femur neck fracture ([address] ), GERD (gastroesophageal reflux disease), Hyperthyroidism, Osteoporosis, Polio, PVD (peripheral vascular disease) ([address] ), Right BBB/left ant fasc block, UTI (urinary tract infection), and Varicella infection     Patient was referred by Senior Care Team  Patient was seen in collaboration with the facility wound team      Wound History:   As per medical record review, patient had the wound on the left plantar since June, 2022  She was seen at outpatient wound center and was referred to podiatrist  Left foot 5th metatarsal head resection, with bilobed flap for skin closure was performed on 7/24/2022  Received patient in bed, complaining of pain on the left foot  As per patient, " Im only taking tylenol, it does not work for me"  As per patient, oxycodone works for her  Supervisor to inform pain physician to obtain order for oxycodone  Patient have a follow up appointment with Dr Satya Steiner on 8/22/2022  Review of Systems   Constitutional: Negative  HENT: Negative  Eyes: Negative  Respiratory: Negative  Cardiovascular: Negative for chest pain and leg swelling  Gastrointestinal: Negative  Endocrine: Negative  Genitourinary: Negative  Musculoskeletal: Positive for gait problem  Skin: Positive for wound  See HPI   Neurological: Negative for dizziness and headaches  Psychiatric/Behavioral: Positive for confusion  Negative for behavioral problems  Objective:    Physical Exam  Constitutional:       Appearance: Normal appearance  HENT:      Head: Normocephalic  Nose: Nose normal       Mouth/Throat:      Pharynx: Oropharynx is clear  Eyes:      Conjunctiva/sclera: Conjunctivae normal    Cardiovascular:      Rate and Rhythm: Normal rate  Pulmonary:      Effort: Pulmonary effort is normal    Abdominal:      Tenderness: There is no abdominal tenderness  There is no guarding  Genitourinary:     Comments: continent  Musculoskeletal:         General: No tenderness  Cervical back: Normal range of motion  Right lower leg: No edema  Left lower leg: No edema  Comments: LROM   Skin:     General: Skin is warm  Findings: Lesion present        Comments: Left foot plantar - wound size - 3 x 5 cm, sutured, intact, with no obvious sign of infection, no drainage   Neurological:      Mental Status: She is alert  Gait: Gait abnormal    Psychiatric:         Mood and Affect: Mood normal          Behavior: Behavior normal               Procedures     Results from last 6 Months   Lab Units 07/21/22 1950   WOUND CULTURE  2+ Growth of Staphylococcus aureus*  1+ Growth of Pseudomonas aeruginosa*  1+ Growth of - Acinetobacter pittii  / Acinetobacter species*       Patient's care was coordinated with nursing facility staff  Recent vitals, labs and updated medications were reviewed on EMR or chart system of facility  Past Medical, surgical, social, medication and allergy history and patient's previous records were reviewed and updated as appropriate: Most up-to date information is available in the facility EMR where the patient is currently admitted      Patient Active Problem List   Diagnosis    Anxiety    RLS (restless legs syndrome)    S/P total hip arthroplasty    Hypothyroidism due to Hashimoto's thyroiditis    Age-related osteoporosis without current pathological fracture    RBBB    Ambulatory dysfunction    Closed compression fracture of L3 lumbar vertebra    Rheumatoid arthritis involving multiple sites with positive rheumatoid factor ([facility]    Chronic pain syndrome    Vitamin D deficiency    Dyspepsia    Other forms of systemic lupus erythematosus ([address] )    Depression    Acquired subluxation of left shoulder    Rupture long head biceps tendon, left, initial encounter    S/P reverse total shoulder arthroplasty, left    Encounter for annual wellness exam in Medicare patient    Shoulder pain, bilateral    Failed orthopedic implant ([address] )    Venous insufficiency    Pyogenic inflammation of bone ([facility]    Hyponatremia    Osteomyelitis of left foot ([facility]    Anemia    Pressure injury of left foot, stage 4 ([facility])    Pain     Past Medical History:   Diagnosis Date    Acute blood loss anemia 9/9/2020    Aftercare following joint replacement 9/9/2020    Patient had right reversed total shoulder arthroplasty   Pain was controlled when he left the hospital       Anxiety     Arthritis     Depression     Disease of thyroid gland     HYPO    Femur neck fracture (HCC)     GERD (gastroesophageal reflux disease)     Hyperthyroidism     RESOLVED: [id number]    Osteoporosis     Polio     [facility]     Right BBB/left ant fasc block     UTI (urinary tract infection)     Varicella infection     LAST ASSESSED: [id number]     Past Surgical History:   Procedure Laterality Date    APPENDECTOMY      HIP ARTHROPLASTY Left 11/27/2017    Procedure: REMOVAL IM NAIL CONVERSION TO TOTAL HIP ARTHROPLASTY POSTERIOR APPROACH;  Surgeon: Fatou Campbell MD;  Location: [facility];  Service: Orthopedics    HIP FRACTURE SURGERY      HIP SURGERY      both hips replaced;2013 left ,2014 right    JOINT REPLACEMENT Right     HIP TOTAL    VT CONV PREV HIP SURG TO TOT HIP Imani Basket Left 10/4/2017    Procedure: [place] removal of left hip;  Surgeon: Fatou Campbell MD;  Location: [facility];  Service: Orthopedics    VT RECONSTR TOTAL SHOULDER IMPLANT Right 9/2/2020    Procedure: ARTHROPLASTY SHOULDER REVERSE;  Surgeon: Luz Ramon MD;  Location: [facility];  Service: Orthopedics    VT RECONSTR TOTAL SHOULDER IMPLANT Left 6/1/2021    Procedure: ARTHROPLASTY SHOULDER REVERSE;  Surgeon: Luz Ramon MD;  Location[facility];  Service: Orthopedics    VT WRIST Peggye Peaks LIG Right 5/24/2022    Procedure: RELEASE CARPAL TUNNEL ENDOSCOPIC-right;  Surgeon: Wes Das MD;  Location: [facility];  Service: Orthopedics    REVISION TOTAL HIP ARTHROPLASTY Right     TOE AMPUTATION Left 7/24/2022    Procedure: 5TH METATARSAL RESECTION WITH BOTTOM FLAP CLOSURE;  Surgeon: Vinicius Lopez DPM;  Location: [facility];  Service: Podiatry    TONSILLECTOMY       Social History     Socioeconomic History  Marital status:      Spouse name: None    Number of children: 1    Years of education: None    Highest education level: None   Occupational History    Occupation: RETIRED    Tobacco Use    Smoking status: Former Smoker    Smokeless tobacco: Never Used    Tobacco comment: quit 20-30 years ago   Vaping Use    Vaping Use: Never used   Substance and Sexual Activity    Alcohol use: Not Currently    Drug use: Not Currently    Sexual activity: Not Currently   Other Topics Concern    None   Social History Narrative    Always uses seat belt    Caffeine use    Sikhism    Single as per all scripts      Social Determinants of Health     Financial Resource Strain: Not on file   Food Insecurity: No Food Insecurity    Worried About Running Out of Food in the Last Year: Never true    Scarlett of Food in the Last Year: Never true   Transportation Needs: No Transportation Needs    Lack of Transportation (Medical): No    Lack of Transportation (Non-Medical):  No   Physical Activity: Not on file   Stress: Not on file   Social Connections: Not on file   Intimate Partner Violence: Not on file   Housing Stability: Low Risk     Unable to Pay for Housing in the Last Year: No    Number of Places Lived in the Last Year: 1    Unstable Housing in the Last Year: No        Current Outpatient Medications:     acetaminophen (TYLENOL) 650 mg CR tablet, Take 1 tablet (650 mg total) by mouth every 8 (eight) hours as needed for mild pain, Disp: 30 tablet, Rfl: 0    Ascorbic Acid, Vitamin C, (VITAMIN C) 100 MG tablet, Take 400 mg by mouth daily, Disp: , Rfl:     ferrous gluconate (FERGON) 324 mg tablet, Take 1 tablet (324 mg total) by mouth in the morning, Disp: 90 tablet, Rfl: 0    folic acid (FOLVITE) 1 mg tablet, Take 2,000 mcg by mouth daily, Disp: , Rfl:     hydroxychloroquine (PLAQUENIL) 200 mg tablet, 1 pill am 1/2 pill pm, Disp: , Rfl:     levothyroxine 75 mcg tablet, Take 0 5 tablets (37 5 mcg total) by mouth daily in the early morning, Disp: 60 tablet, Rfl: 0    meloxicam (Mobic) 7 5 mg tablet, Take 1 tablet (7 5 mg total) by mouth daily, Disp: 30 tablet, Rfl: 0    methocarbamol (ROBAXIN) 750 mg tablet, Take 1 tablet (750 mg total) by mouth 2 (two) times a dayas needed for muscle spasms, Disp: 30 tablet, Rfl: 0    methotrexate 2 5 MG tablet, 3 tablets by mouth once a week on Thursday, Disp:  , Rfl:     Multiple Vitamins-Minerals (CENTRUM SILVER PO), Take 1 tablet by mouth daily, Disp: , Rfl:     nortriptyline (PAMELOR) 50 mg capsule, Take 2 capsules (100 mg total) by mouth daily at bedtime, Disp: 90 capsule, Rfl: 1    predniSONE 5 mg tablet, Take 5 mg by mouth daily, Disp: , Rfl:     rOPINIRole (REQUIP) 2 mg tablet, Take 1 tablet (2 mg total) by mouth daily at bedtime Take 1 tablet by mouth daily at bedtime, Disp: 90 tablet, Rfl: 1    senna-docusate sodium (SENOKOT S) 8 6-50 mg per tablet, Take 1 tablet by mouth 2 (two) times a day, Disp: 20 tablet, Rfl: 0  Family History   Problem Relation Age of Onset    Rheum arthritis Mother     Rheum arthritis Sister     Prostate cancer Brother               Coordination of Care: Wound team aware of the treatment plan  Facility nurse will provide wound treatment and monitor the wound for any changes  Patient / Staff education : Patient / Staff was given education on sign of infection and pressure ulcer prevention  Patient/ Staff verbalized understanding     Follow up :  Next week    Voice-recognition software may have been used in the preparation of this document  Occasional wrong word or "sound-alike" substitutions may have occurred due to the inherent limitations of voice recognition software  Interpretation should be guided by context        CONNIE Elizondo

## 2022-08-09 NOTE — ASSESSMENT & PLAN NOTE
Left foot  - Onset - June 2022  - s/p Left foot 5th metatarsal head resection, with bilobed flap for skin closure on 7/24/2022  - sutured, intact, no drainage, no obvious sign of infection  - local wound care - xerofoam  - continue to follow up with Dr Kulwinder Joaquin   - follow up next week

## 2022-08-09 NOTE — TELEPHONE ENCOUNTER
This is a [facility] wound care pt never seen in our office  Please direct this to the correct department pool to continue with pt care  0

## 2022-08-09 NOTE — TELEPHONE ENCOUNTER
Thank you Dr Russell Douglas I will forward this to [facility] so they can reach out and schedule an appt for her

## 2022-08-16 ENCOUNTER — NURSING HOME VISIT (OUTPATIENT)
Dept: WOUND CARE | Facility: HOSPITAL | Age: 77
End: 2022-08-16
Payer: MEDICARE

## 2022-08-16 DIAGNOSIS — L89.894 PRESSURE INJURY OF LEFT FOOT, STAGE 4 (HCC): Primary | ICD-10-CM

## 2022-08-16 PROCEDURE — 99308 SBSQ NF CARE LOW MDM 20: CPT | Performed by: NURSE PRACTITIONER

## 2022-08-16 NOTE — PROGRESS NOTES
[address] MANAGEMENT   AND [facility]PER[facility]INE [facility]ENTER       Patient ID: Dwayne Pacheco is a 65 y o  female Date of Birth 1945     [facility]    Performed wound round with: Wound team     Chief Complaint : Left foot plantar    Wound Instructions:  Wound:  Left foot plantar  Discontinue previous wound order  Cleanse the wound bed with NSS   Apply non-sting skin prep to periwound area  Apply xerofoam to wound bed, then cover with ABD and kerlix, ace wrap  Frequency : daily and prn for soiling  NWB on the left foot   Offload all wounds  Turn and reposition frequently,   Increase protein intake  Monitor for any sign of infection or worsening, inform PCP or patient's primary physician in your facility  Allergies  Patient has no known allergies  Assessment & Plan:  1  Pressure injury of left foot, stage 4 [facility])  Assessment & Plan:  Left foot  - Onset - June 2022  - s/p Left foot 5th metatarsal head resection, with bilobed flap for skin closure on 7/24/2022  - sutured, intact, no drainage, no obvious sign of infection  - local wound care - xerofoam  - continue to follow up with Dr Marietta Landon supervisor to call Dr Jazmin May to inquire if the suture can be d/c  - follow up next week             Subjective:   8/9/2022 This is a 65 y o , female referred to our service for wound/ skin alterations on left foot plantar  Patient have a complex medical history including but not limited to  has a past medical history of Acute blood loss anemia, Aftercare following joint replacement, Anxiety, Arthritis, Depression, Disease of thyroid gland, Femur neck fracture ([address] ), GERD (gastroesophageal reflux disease), Hyperthyroidism, Osteoporosis, Polio, PVD (peripheral vascular disease) ([address] ), Right BBB/left ant fasc block, UTI (urinary tract infection), and Varicella infection     Patient was referred by Senior Care Team  Patient was seen in collaboration with the facility wound team  Wound History:   As per medical record review, patient had the wound on the left plantar since June, 2022  She was seen at outpatient wound center and was referred to podiatrist  Left foot 5th metatarsal head resection, with bilobed flap for skin closure was performed on 7/24/2022  Received patient in bed, complaining of pain on the left foot  As per patient, " Im only taking tylenol, it does not work for me"  As per patient, oxycodone works for her  Supervisor to inform pain physician to obtain order for oxycodone  Patient have a follow up appointment with Dr Savanna White on 8/22/2022 8/16/2022 Follow up for wound on the right foot   Received patient, not in distress  Facility staff did not report any significant issues related to the wound  Patient verbalized that she is not having too much pain due to oxycodone  Review of Systems   Constitutional: Negative  HENT: Negative  Eyes: Negative  Respiratory: Negative  Cardiovascular: Negative for chest pain and leg swelling  Gastrointestinal: Negative  Endocrine: Negative  Genitourinary: Negative  Musculoskeletal: Positive for gait problem  Skin: Positive for wound  See HPI   Neurological: Negative for dizziness and headaches  Psychiatric/Behavioral: Positive for confusion  Negative for behavioral problems  Objective:    Physical Exam  Constitutional:       Appearance: Normal appearance  HENT:      Head: Normocephalic  Nose: Nose normal       Mouth/Throat:      Pharynx: Oropharynx is clear  Eyes:      Conjunctiva/sclera: Conjunctivae normal    Pulmonary:      Effort: Pulmonary effort is normal    Abdominal:      Tenderness: There is no abdominal tenderness  There is no guarding  Genitourinary:     Comments: continent  Musculoskeletal:         General: No tenderness  Cervical back: Normal range of motion  Right lower leg: No edema  Left lower leg: No edema        Comments: LROM   Skin: Findings: Lesion present  Comments: Left foot plantar - wound size - 2 x 3 5 cm, sutured, intact, with no obvious sign of infection, no drainage   Neurological:      Mental Status: She is alert  Gait: Gait abnormal    Psychiatric:         Mood and Affect: Mood normal          Behavior: Behavior normal               Procedures     Results from last 6 Months   Lab Units 07/21/22 1950   WOUND CULTURE  2+ Growth of Staphylococcus aureus*  1+ Growth of Pseudomonas aeruginosa*  1+ Growth of - Acinetobacter pittii  / Acinetobacter species*       Patient's care was coordinated with nursing facility staff  Recent vitals, labs and updated medications were reviewed on EMR or chart system of facility  Past Medical, surgical, social, medication and allergy history and patient's previous records were reviewed and updated as appropriate: Most up-to date information is available in the facility EMR where the patient is currently admitted      Patient Active Problem List   Diagnosis    Anxiety    RLS (restless legs syndrome)    S/P total hip arthroplasty    Hypothyroidism due to Hashimoto's thyroiditis    Age-related osteoporosis without current pathological fracture    RBBB    Ambulatory dysfunction    Closed compression fracture of L3 lumbar vertebra    Rheumatoid arthritis involving multiple sites with positive rheumatoid factor ([facility])    Chronic pain syndrome    Vitamin D deficiency    Dyspepsia    Other forms of systemic lupus erythematosus ([address] )    Depression    Acquired subluxation of left shoulder    Rupture long head biceps tendon, left, initial encounter    S/P reverse total shoulder arthroplasty, left    Encounter for annual wellness exam in Medicare patient    Shoulder pain, bilateral    Failed orthopedic implant ([address] )    Venous insufficiency    Pyogenic inflammation of bone ([facility])    Hyponatremia    Osteomyelitis of left foot ([address] )    Anemia    Pressure injury of left foot, stage 4 [facility])    Pain     Past Medical History:   Diagnosis Date    Acute blood loss anemia 9/9/2020    Aftercare following joint replacement 9/9/2020    Patient had right reversed total shoulder arthroplasty   Pain was controlled when he left the hospital       Anxiety     Arthritis     Depression     Disease of thyroid gland     HYPO    Femur neck fracture (HCC)     GERD (gastroesophageal reflux disease)     Hyperthyroidism     RESOLVED: [id number]    Osteoporosis     Polio     PVD (peripheral vascular disease) ([address] )     Right BBB/left ant fasc block     UTI (urinary tract infection)     Varicella infection     LAST ASSESSED: [id number]     Past Surgical History:   Procedure Laterality Date    APPENDECTOMY      HIP ARTHROPLASTY Left 11/27/2017    Procedure: REMOVAL IM NAIL CONVERSION TO TOTAL HIP ARTHROPLASTY POSTERIOR APPROACH;  Surgeon: Joao Jean MD;  [facility];  Service: Orthopedics    HIP FRACTURE SURGERY      HIP SURGERY      both hips replaced;2013 left ,2014 right    JOINT REPLACEMENT Right     HIP TOTAL    HI CONV PREV HIP SURG TO TOT HIP Ted Hinders Left 10/4/2017    Procedure: Adriana Conner removal of left hip;  Surgeon: Joao Jean MD;  [facility];  Service: Orthopedics    HI RECONSTR TOTAL SHOULDER IMPLANT Right 9/2/2020    Procedure: ARTHROPLASTY SHOULDER REVERSE;  Surgeon: Baljeet Lund MD;  [facility];  Service: Orthopedics    HI [address] IMPLANT Left 6/1/2021    Procedure: ARTHROPLASTY SHOULDER REVERSE;  Surgeon: Baljeet Lund MD;  [facility];  Service: Orthopedics    HI WRIST Adrienne Small LIG Right 5/24/2022    Procedure: RELEASE CARPAL TUNNEL ENDOSCOPIC-right;  Surgeon: Sara Haas MD;  [facility];  Service: Orthopedics    REVISION TOTAL HIP ARTHROPLASTY Right     TOE AMPUTATION Left 7/24/2022    Procedure: 5TH METATARSAL RESECTION WITH BOTTOM FLAP CLOSURE;  Surgeon: Violet Thrasher KILO;  [facility];  Service: Podiatry    TONSILLECTOMY       Social History     Socioeconomic History    Marital status:      Spouse name: None    Number of children: 1    Years of education: None    Highest education level: None   Occupational History    Occupation: RETIRED    Tobacco Use    Smoking status: Former Smoker    Smokeless tobacco: Never Used    Tobacco comment: quit 20-30 years ago   Vaping Use    Vaping Use: Never used   Substance and Sexual Activity    Alcohol use: Not Currently    Drug use: Not Currently    Sexual activity: Not Currently   Other Topics Concern    None   Social History Narrative    Always uses seat belt    Caffeine use    Religious    Single as per all scripts      Social Determinants of Health     Financial Resource Strain: Not on file   Food Insecurity: No Food Insecurity    Worried About Running Out of Food in the Last Year: Never true    Scarlett of Food in the Last Year: Never true   Transportation Needs: No Transportation Needs    Lack of Transportation (Medical): No    Lack of Transportation (Non-Medical):  No   Physical Activity: Not on file   Stress: Not on file   Social Connections: Not on file   Intimate Partner Violence: Not on file   Housing Stability: Low Risk     Unable to Pay for Housing in the Last Year: No    Number of Places Lived in the Last Year: 1    Unstable Housing in the Last Year: No        Current Outpatient Medications:     acetaminophen (TYLENOL) 650 mg CR tablet, Take 1 tablet (650 mg total) by mouth every 8 (eight) hours as needed for mild pain, Disp: 30 tablet, Rfl: 0    Ascorbic Acid, Vitamin C, (VITAMIN C) 100 MG tablet, Take 400 mg by mouth daily, Disp: , Rfl:     ferrous gluconate (FERGON) 324 mg tablet, Take 1 tablet (324 mg total) by mouth in the morning, Disp: 90 tablet, Rfl: 0    folic acid (FOLVITE) 1 mg tablet, Take 2,000 mcg by mouth daily, Disp: , Rfl:     hydroxychloroquine (PLAQUENIL) 200 mg tablet, 1 pill am 1/2 pill pm, Disp: , Rfl:     levothyroxine 75 mcg tablet, Take 0 5 tablets (37 5 mcg total) by mouth daily in the early morning, Disp: 60 tablet, Rfl: 0    meloxicam (Mobic) 7 5 mg tablet, Take 1 tablet (7 5 mg total) by mouth daily, Disp: 30 tablet, Rfl: 0    methocarbamol (ROBAXIN) 750 mg tablet, Take 1 tablet (750 mg total) by mouth 2 (two) times a dayas needed for muscle spasms, Disp: 30 tablet, Rfl: 0    methotrexate 2 5 MG tablet, 3 tablets by mouth once a week on Thursday, Disp:  , Rfl:     Multiple Vitamins-Minerals (CENTRUM SILVER PO), Take 1 tablet by mouth daily, Disp: , Rfl:     nortriptyline (PAMELOR) 50 mg capsule, Take 2 capsules (100 mg total) by mouth daily at bedtime, Disp: 90 capsule, Rfl: 1    predniSONE 5 mg tablet, Take 5 mg by mouth daily, Disp: , Rfl:     rOPINIRole (REQUIP) 2 mg tablet, Take 1 tablet (2 mg total) by mouth daily at bedtime Take 1 tablet by mouth daily at bedtime, Disp: 90 tablet, Rfl: 1    senna-docusate sodium (SENOKOT S) 8 6-50 mg per tablet, Take 1 tablet by mouth 2 (two) times a day, Disp: 20 tablet, Rfl: 0  Family History   Problem Relation Age of Onset    Rheum arthritis Mother     Rheum arthritis Sister     Prostate cancer Brother               Coordination of Care: Wound team aware of the treatment plan  Facility nurse will provide wound treatment and monitor the wound for any changes  Patient / Staff education : Patient / Staff was given education on sign of infection and pressure ulcer prevention  Patient/ Staff verbalized understanding     Follow up :  Next week    Voice-recognition software may have been used in the preparation of this document  Occasional wrong word or "sound-alike" substitutions may have occurred due to the inherent limitations of voice recognition software  Interpretation should be guided by CONNIE Cooper

## 2022-08-16 NOTE — ASSESSMENT & PLAN NOTE
Left foot  - Onset - June 2022  - s/p Left foot 5th metatarsal head resection, with bilobed flap for skin closure on 7/24/2022  - sutured, intact, no drainage, no obvious sign of infection  - local wound care - xerofoam  - continue to follow up with Dr Armand Brittle supervisor to call Dr Marcel Fitzgerald to inquire if the suture can be d/c  - follow up next week

## 2022-08-22 ENCOUNTER — TELEPHONE (OUTPATIENT)
Dept: PODIATRY | Facility: CLINIC | Age: 77
End: 2022-08-22

## 2022-08-22 ENCOUNTER — OFFICE VISIT (OUTPATIENT)
Dept: PODIATRY | Facility: CLINIC | Age: 77
End: 2022-08-22

## 2022-08-22 ENCOUNTER — TELEPHONE (OUTPATIENT)
Dept: OBGYN CLINIC | Facility: OTHER | Age: 77
End: 2022-08-22

## 2022-08-22 VITALS
SYSTOLIC BLOOD PRESSURE: 116 MMHG | DIASTOLIC BLOOD PRESSURE: 62 MMHG | HEART RATE: 80 BPM | HEIGHT: 58 IN | OXYGEN SATURATION: 97 % | BODY MASS INDEX: 20.55 KG/M2

## 2022-08-22 DIAGNOSIS — M86.172 OSTEOMYELITIS OF ANKLE OR FOOT, LEFT, ACUTE (HCC): Primary | ICD-10-CM

## 2022-08-22 DIAGNOSIS — E11.621 DIABETIC ULCER OF LEFT MIDFOOT ASSOCIATED WITH TYPE 2 DIABETES MELLITUS, WITH BONE INVOLVEMENT WITHOUT EVIDENCE OF NECROSIS (HCC): ICD-10-CM

## 2022-08-22 DIAGNOSIS — L97.426 DIABETIC ULCER OF LEFT MIDFOOT ASSOCIATED WITH TYPE 2 DIABETES MELLITUS, WITH BONE INVOLVEMENT WITHOUT EVIDENCE OF NECROSIS (HCC): ICD-10-CM

## 2022-08-22 LAB — FUNGUS SPEC CULT: NORMAL

## 2022-08-22 PROCEDURE — 99024 POSTOP FOLLOW-UP VISIT: CPT | Performed by: PODIATRIST

## 2022-08-22 NOTE — PROGRESS NOTES
Assessment/Plan:    - The sutures were removed today from the left foot  The bilobed flap appeared to be viable and well incorporated  - She may start weight-bearing as tolerated to the left foot with a surgical shoe  - Recommend dry sterile dressing to pad the flap site until her next visit in 2 weeks  Diagnoses and all orders for this visit:    Osteomyelitis of ankle or foot, left, acute ([address] )    Diabetic ulcer of left midfoot associated with type 2 diabetes mellitus, with bone involvement without evidence of necrosis ([address] )    Other orders  -     MELATONIN PO; Take by mouth  -     OXYCODONE HCL PO; Take by mouth          Subjective:      Patient ID: Walt Fountain is a 65 y o  female  The patient presents today for follow-up status post recent 5th metatarsal head resection bilobed flap closure of a full-thickness diabetic foot wound with osteomyelitis of the left foot  She is currently in the rehab center and she is nonweightbearing to her left foot  She notes interval improvement in reduction in the amount of foot pain she was having at the surgical site since her discharge to rehab  She presents in a wheelchair with a surgical shoe on her left foot  The following portions of the patient's history were reviewed and updated as appropriate: allergies, current medications, past family history, past medical history, past social history, past surgical history and problem list     Review of Systems   Constitutional: Negative for chills and fever  HENT: Negative for ear pain and sore throat  Eyes: Negative for pain and visual disturbance  Respiratory: Negative for cough and shortness of breath  Cardiovascular: Negative for chest pain and palpitations  Gastrointestinal: Negative for abdominal pain and vomiting  Genitourinary: Negative for dysuria and hematuria  Musculoskeletal: Negative for arthralgias and back pain  Skin: Negative for color change and rash     Neurological: Negative for seizures and syncope  Psychiatric/Behavioral: Negative  All other systems reviewed and are negative  Objective:      /62   Pulse 80   Ht 4' 10" (1 473 m)   SpO2 97%   BMI 20 55 kg/m²          Physical Exam  Constitutional:       Appearance: Normal appearance  HENT:      Head: Normocephalic and atraumatic  Nose: Nose normal    Pulmonary:      Effort: Pulmonary effort is normal    Musculoskeletal:        Feet:    Feet:      Comments: The sutures to the bilobed flap removed today; there is good incorporation of the graft, the graft is pink and viable  Skin:     General: Skin is warm  Capillary Refill: Capillary refill takes less than 2 seconds  Neurological:      General: No focal deficit present  Mental Status: She is alert and oriented to person, place, and time  Psychiatric:         Mood and Affect: Mood normal          Behavior: Behavior normal          Thought Content:  Thought content normal

## 2022-08-23 LAB — MYCOBACTERIUM SPEC CULT: NORMAL

## 2022-08-29 DIAGNOSIS — G25.81 RLS (RESTLESS LEGS SYNDROME): ICD-10-CM

## 2022-08-30 LAB — MYCOBACTERIUM SPEC CULT: NORMAL

## 2022-09-01 RX ORDER — METHOCARBAMOL 750 MG/1
TABLET, FILM COATED ORAL
Qty: 30 TABLET | Refills: 0 | Status: SHIPPED | OUTPATIENT
Start: 2022-09-01 | End: 2022-09-13

## 2022-09-06 LAB — MYCOBACTERIUM SPEC CULT: NORMAL

## 2022-09-12 ENCOUNTER — OFFICE VISIT (OUTPATIENT)
Dept: PODIATRY | Facility: CLINIC | Age: 77
End: 2022-09-12

## 2022-09-12 VITALS — HEIGHT: 58 IN | WEIGHT: 98 LBS | BODY MASS INDEX: 20.57 KG/M2

## 2022-09-12 DIAGNOSIS — G25.81 RESTLESS LEGS SYNDROME: ICD-10-CM

## 2022-09-12 DIAGNOSIS — M86.172 OTHER ACUTE OSTEOMYELITIS OF LEFT FOOT (HCC): Primary | ICD-10-CM

## 2022-09-12 DIAGNOSIS — G25.81 RLS (RESTLESS LEGS SYNDROME): ICD-10-CM

## 2022-09-12 PROCEDURE — 99024 POSTOP FOLLOW-UP VISIT: CPT | Performed by: PODIATRIST

## 2022-09-12 NOTE — PROGRESS NOTES
Assessment/Plan:     The plantar left foot wound has completely healed  The bilobed flap is well healed and completely viable  At this point is located DC dressings to the left foot  Recommend socks and a supportive sneaker  Diagnoses and all orders for this visit:    Other acute osteomyelitis of left foot ([facility] [address] )          Subjective:     Patient ID: Richard Sotomayor is a 65 y o  female  The patient presents today with her daughter for follow-up of her left foot wound with a history of acute osteomyelitis of the 5th metatarsal head and neck  She is now home after having been discharged from a rehab facility  She denies any acute pedal issues today  Review of Systems   Constitutional: Negative for chills and fever  HENT: Negative for ear pain and sore throat  Eyes: Negative for pain and visual disturbance  Respiratory: Negative for cough and shortness of breath  Cardiovascular: Negative for chest pain and palpitations  Gastrointestinal: Negative for abdominal pain and vomiting  Genitourinary: Negative for dysuria and hematuria  Musculoskeletal: Negative for arthralgias and back pain  Skin: Negative for color change and rash  Neurological: Negative for seizures and syncope  Psychiatric/Behavioral: Negative  All other systems reviewed and are negative  Objective:     Physical Exam  Constitutional:       Appearance: Normal appearance  HENT:      Head: Normocephalic and atraumatic  Nose: Nose normal    Pulmonary:      Effort: Pulmonary effort is normal    Musculoskeletal:        Feet:    Feet:      Comments: The area of the bilobed flap is fully healed and fully incorporated  The left 5th toe remains in anatomic alignment despite head and neck resection secondary to osteomyelitis  Skin:     General: Skin is warm  Capillary Refill: Capillary refill takes less than 2 seconds  Neurological:      General: No focal deficit present        Mental Status: She is alert and oriented to person, place, and time  Psychiatric:         Mood and Affect: Mood normal          Behavior: Behavior normal          Thought Content:  Thought content normal

## 2022-09-13 LAB — MYCOBACTERIUM SPEC CULT: NORMAL

## 2022-09-13 RX ORDER — ROPINIROLE 2 MG/1
TABLET, FILM COATED ORAL
Qty: 90 TABLET | Refills: 0 | Status: SHIPPED | OUTPATIENT
Start: 2022-09-13 | End: 2022-10-03 | Stop reason: SDUPTHER

## 2022-09-13 RX ORDER — METHOCARBAMOL 750 MG/1
TABLET, FILM COATED ORAL
Qty: 30 TABLET | Refills: 0 | Status: SHIPPED | OUTPATIENT
Start: 2022-09-13 | End: 2022-10-03 | Stop reason: SDUPTHER

## 2022-09-20 LAB — MYCOBACTERIUM SPEC CULT: NORMAL

## 2022-09-22 ENCOUNTER — TELEPHONE (OUTPATIENT)
Dept: FAMILY MEDICINE CLINIC | Facility: CLINIC | Age: 77
End: 2022-09-22

## 2022-09-22 NOTE — TELEPHONE ENCOUNTER
Shannon Day from [facility] home care Called  Patient fell last night 09/21/2022 she fell off the bed but refused to go to ER because she stated she was not hurt and was fine

## 2022-09-23 LAB — HBA1C MFR BLD HPLC: 5.5 %

## 2022-09-25 ENCOUNTER — APPOINTMENT (EMERGENCY)
Dept: RADIOLOGY | Facility: HOSPITAL | Age: 77
End: 2022-09-25
Payer: MEDICARE

## 2022-09-25 ENCOUNTER — HOSPITAL ENCOUNTER (EMERGENCY)
Facility: HOSPITAL | Age: 77
Discharge: HOME | End: 2022-09-26
Attending: EMERGENCY MEDICINE
Payer: MEDICARE

## 2022-09-25 ENCOUNTER — APPOINTMENT (OUTPATIENT)
Dept: RADIOLOGY | Facility: HOSPITAL | Age: 77
End: 2022-09-25
Payer: MEDICARE

## 2022-09-25 DIAGNOSIS — S43.005A SHOULDER DISLOCATION, LEFT, INITIAL ENCOUNTER: Primary | ICD-10-CM

## 2022-09-25 PROCEDURE — 73020 X-RAY EXAM OF SHOULDER: CPT

## 2022-09-25 PROCEDURE — G1004 CDSM NDSC: HCPCS

## 2022-09-25 PROCEDURE — NC001 PR NO CHARGE: Performed by: ORTHOPAEDIC SURGERY

## 2022-09-25 PROCEDURE — 70450 CT HEAD/BRAIN W/O DYE: CPT

## 2022-09-25 PROCEDURE — 73030 X-RAY EXAM OF SHOULDER: CPT

## 2022-09-25 PROCEDURE — 99284 EMERGENCY DEPT VISIT MOD MDM: CPT

## 2022-09-25 PROCEDURE — 72125 CT NECK SPINE W/O DYE: CPT

## 2022-09-25 PROCEDURE — 99157 MOD SED OTHER PHYS/QHP EA: CPT | Performed by: EMERGENCY MEDICINE

## 2022-09-25 PROCEDURE — 99285 EMERGENCY DEPT VISIT HI MDM: CPT | Performed by: EMERGENCY MEDICINE

## 2022-09-25 PROCEDURE — 99156 MOD SED OTH PHYS/QHP 5/>YRS: CPT | Performed by: EMERGENCY MEDICINE

## 2022-09-25 RX ORDER — ROPINIROLE 2 MG/1
2 TABLET, FILM COATED ORAL ONCE
Status: COMPLETED | OUTPATIENT
Start: 2022-09-25 | End: 2022-09-25

## 2022-09-25 RX ORDER — PROPOFOL 10 MG/ML
100 INJECTION, EMULSION INTRAVENOUS ONCE
Status: COMPLETED | OUTPATIENT
Start: 2022-09-25 | End: 2022-09-26

## 2022-09-25 RX ORDER — OXYCODONE HYDROCHLORIDE 5 MG/1
5 TABLET ORAL ONCE
Status: COMPLETED | OUTPATIENT
Start: 2022-09-25 | End: 2022-09-25

## 2022-09-25 RX ORDER — ACETAMINOPHEN 325 MG/1
975 TABLET ORAL ONCE
Status: COMPLETED | OUTPATIENT
Start: 2022-09-25 | End: 2022-09-25

## 2022-09-25 RX ADMIN — ACETAMINOPHEN 975 MG: 325 TABLET, FILM COATED ORAL at 21:46

## 2022-09-25 RX ADMIN — OXYCODONE HYDROCHLORIDE 5 MG: 5 TABLET ORAL at 23:34

## 2022-09-25 RX ADMIN — ROPINIROLE 2 MG: 2 TABLET, FILM COATED ORAL at 22:21

## 2022-09-26 ENCOUNTER — APPOINTMENT (OUTPATIENT)
Dept: RADIOLOGY | Facility: HOSPITAL | Age: 77
End: 2022-09-26
Payer: MEDICARE

## 2022-09-26 VITALS
TEMPERATURE: 97.4 F | RESPIRATION RATE: 18 BRPM | OXYGEN SATURATION: 98 % | HEART RATE: 72 BPM | DIASTOLIC BLOOD PRESSURE: 65 MMHG | SYSTOLIC BLOOD PRESSURE: 140 MMHG

## 2022-09-26 PROCEDURE — 73020 X-RAY EXAM OF SHOULDER: CPT

## 2022-09-26 PROCEDURE — 96374 THER/PROPH/DIAG INJ IV PUSH: CPT

## 2022-09-26 RX ORDER — LORAZEPAM 2 MG/ML
1 INJECTION INTRAMUSCULAR ONCE
Status: COMPLETED | OUTPATIENT
Start: 2022-09-26 | End: 2022-09-26

## 2022-09-26 RX ADMIN — LORAZEPAM 1 MG: 2 INJECTION INTRAMUSCULAR; INTRAVENOUS at 02:20

## 2022-09-26 RX ADMIN — PROPOFOL 100 MG: 10 INJECTION, EMULSION INTRAVENOUS at 00:09

## 2022-09-26 NOTE — CONSULTS
Orthopedics   Geetha Colon 65 y o  female MRN: [id number]  Unit/Bed#: Cat Scan      Chief Complaint:   left shoulder pain    HPI:  65 y o  right hand dominant female complaining of left shoulder pain  Of note, patient had a left reverse total shoulder arthroplasty with Dr Lachelle Edge on 6/1/21  Following a traumatic injury causing glenoid base plate failure, Dr Lachelle Edge offered the family a resection arthroplasty  The family opted to maximize non-operative treatment via pain management at that time  Positive Headstrike, no LOC, not on Blood thinners  Pain is sharp in character, Located globally around L shoulder, acute in onset, constant in duration, severe in intensity  Exacerbating factors motion and palpation, remitting factors rest  Nonradiating, denies numbness, denies tingling, no open wounds noted  No other complaints at this time  PMH significant for HTN, hypothyroidism, osteoporosis, PVD  Occupation retured  Review Of Systems:   · Skin: Normal  · Neuro: See HPI  · Musculoskeletal: See HPI  · 14 point review of systems negative except as stated above     Past Medical History:   Past Medical History:   Diagnosis Date    Acute blood loss anemia 9/9/2020    Aftercare following joint replacement 9/9/2020    Patient had right reversed total shoulder arthroplasty   Pain was controlled when he left the hospital       Anxiety     Arthritis     Depression     Disease of thyroid gland     HYPO    Femur neck fracture (HCC)     GERD (gastroesophageal reflux disease)     Hyperthyroidism     RESOLVED: [id number]    Osteoporosis     Polio     PVD [facility]peripheral vascular disease) ([facility] [address] )     Right BBB/left ant fasc block     UTI (urinary tract infection)     Varicella infection     LAST ASSESSED: [id number]       Past Surgical History:   Past Surgical History:   Procedure Laterality Date    APPENDECTOMY      HIP ARTHROPLASTY Left 11/27/2017    Procedure: REMOVAL IM NAIL CONVERSION TO TOTAL HIP ARTHROPLASTY POSTERIOR APPROACH;  Surgeon: Kate Cannon MD;  [facility];  Service: Orthopedics    HIP FRACTURE SURGERY      HIP SURGERY      both hips replaced;2013 left ,2014 right    JOINT REPLACEMENT Right     HIP TOTAL    UT CONV PREV HIP SURG TO TOT HIP Bertha Hernandez Left 10/4/2017    Procedure: Paralee Jean removal of left hip;  Surgeon: Kate Cannon MD;  [facility];  Service: Orthopedics    UT RECONSTR TOTAL SHOULDER IMPLANT Right 9/2/2020    Procedure: ARTHROPLASTY SHOULDER REVERSE;  Surgeon: Shivam Tee MD;  [facility];  Service: Orthopedics    UT RECONSTR TOTAL SHOULDER IMPLANT Left 6/1/2021    Procedure: ARTHROPLASTY SHOULDER REVERSE;  Surgeon: Shivam Tee MD;  [facility];  Service: Orthopedics    UT WRIST Hershall Razia LIG Right 5/24/2022    Procedure: RELEASE CARPAL TUNNEL ENDOSCOPIC-right;  Surgeon: Jearldine Goodell, MD;  [facility];  Service: Orthopedics    REVISION TOTAL HIP ARTHROPLASTY Right     TOE AMPUTATION Left 7/24/2022    Procedure: 5TH METATARSAL RESECTION WITH BOTTOM FLAP CLOSURE;  Surgeon: Angela Moreno DPM;  [facility];  Service: Podiatry    TONSILLECTOMY         Family History:  Family history reviewed and non-contributory  Family History   Problem Relation Age of Onset    Rheum arthritis Mother     Rheum arthritis Sister     Prostate cancer Brother        Social History:  Social History     Socioeconomic History    Marital status:      Spouse name: None    Number of children: 1    Years of education: None    Highest education level: None   Occupational History    Occupation: RETIRED    Tobacco Use    Smoking status: Former Smoker    Smokeless tobacco: Never Used    Tobacco comment: quit 20-30 years ago   Vaping Use    Vaping Use: Never used   Substance and Sexual Activity    Alcohol use: Not Currently    Drug use: Not Currently    Sexual activity: Not Currently   Other Topics Concern    None Social History Narrative    Always uses seat belt    Caffeine use    Anabaptist    Single as per all scripts      Social Determinants of Health     Financial Resource Strain: Not on file   Food Insecurity: No Food Insecurity    Worried About Running Out of Food in the Last Year: Never true    Scarlett of Food in the Last Year: Never true   Transportation Needs: No Transportation Needs    Lack of Transportation (Medical): No    Lack of Transportation (Non-Medical):  No   Physical Activity: Not on file   Stress: Not on file   Social Connections: Not on file   Intimate Partner Violence: Not on file   Housing Stability: Low Risk     Unable to Pay for Housing in the Last Year: No    Number of Places Lived in the Last Year: 1    Unstable Housing in the Last Year: No       Allergies:   No Known Allergies        Labs:  0   Lab Value Date/Time    HCT 37 1 08/02/2022 1328    HCT 34 9 08/01/2022 0505    HCT 35 0 07/29/2022 0452    HCT 36 8 12/16/2014 0727    HCT 28 8 (L) 10/30/2014 0626    HCT 29 2 (L) 10/27/2014 0648    HGB 11 3 (L) 08/02/2022 1328    HGB 10 5 (L) 08/01/2022 0505    HGB 10 7 (L) 07/29/2022 0452    HGB 11 9 12/16/2014 0727    HGB 8 8 (L) 10/30/2014 0626    HGB 9 0 (L) 10/27/2014 0648    INR 0 84 12/26/2021 0752    INR 0 96 10/13/2014 0608    WBC 6 50 08/02/2022 1328    WBC 6 54 08/01/2022 0505    WBC 7 64 07/29/2022 0452    WBC 5 60 12/16/2014 0727    WBC 5 58 10/30/2014 0626    WBC 6 95 10/27/2014 0648    ESR 8 03/08/2022 1121    CRP <3 0 03/08/2022 1121       Meds:    Current Facility-Administered Medications:     oxyCODONE (ROXICODONE) IR tablet 5 mg, 5 mg, Oral, Once, Raquel Cota MD    Current Outpatient Medications:     acetaminophen (TYLENOL) 650 mg CR tablet, Take 1 tablet (650 mg total) by mouth every 8 (eight) hours as needed for mild pain, Disp: 30 tablet, Rfl: 0    Ascorbic Acid, Vitamin C, (VITAMIN C) 100 MG tablet, Take 400 mg by mouth daily, Disp: , Rfl:     ferrous gluconate (FERGON) 324 mg tablet, Take 1 tablet (324 mg total) by mouth in the morning, Disp: 90 tablet, Rfl: 0    folic acid (FOLVITE) 1 mg tablet, Take 2,000 mcg by mouth daily, Disp: , Rfl:     hydroxychloroquine (PLAQUENIL) 200 mg tablet, 1 pill am 1/2 pill pm, Disp: , Rfl:     levothyroxine 75 mcg tablet, Take 0 5 tablets (37 5 mcg total) by mouth daily in the early morning, Disp: 60 tablet, Rfl: 0    MELATONIN PO, Take by mouth, Disp: , Rfl:     meloxicam (Mobic) 7 5 mg tablet, Take 1 tablet (7 5 mg total) by mouth daily, Disp: 30 tablet, Rfl: 0    methocarbamol (ROBAXIN) 750 mg tablet, Take 1 tablet (750 mg total) by mouth 2 (two) times a day as needed for muscle spasms, Disp: 30 tablet, Rfl: 0    methotrexate 2 5 MG tablet, 3 tablets by mouth once a week on Thursday, Disp:  , Rfl:     Multiple Vitamins-Minerals (CENTRUM SILVER PO), Take 1 tablet by mouth daily, Disp: , Rfl:     nortriptyline (PAMELOR) 50 mg capsule, Take 2 capsules (100 mg total) by mouth daily at bedtime, Disp: 90 capsule, Rfl: 1    OXYCODONE HCL PO, Take by mouth, Disp: , Rfl:     predniSONE 5 mg tablet, Take 5 mg by mouth daily, Disp: , Rfl:     rOPINIRole (REQUIP) 2 mg tablet, TAKE (1) TABLET BY MOUTH AT BEDTIME , Disp: 90 tablet, Rfl: 0    senna-docusate sodium (SENOKOT S) 8 6-50 mg per tablet, Take 1 tablet by mouth 2 (two) times a day, Disp: 20 tablet, Rfl: 0    Blood Culture:   Lab Results   Component Value Date    BLOODCX No Growth After 5 Days  12/02/2019    BLOODCX No Growth After 5 Days  12/02/2019       Wound Culture:   Lab Results   Component Value Date    WOUNDCULT 2+ Growth of Staphylococcus aureus (A) 07/21/2022    WOUNDCULT 1+ Growth of Pseudomonas aeruginosa (A) 07/21/2022    WOUNDCULT (A) 07/21/2022     1+ Growth of - Acinetobacter pittii  / Acinetobacter species       Ins and Outs:  No intake/output data recorded            Physical Exam:   /78   Pulse 80   Temp (!) 97 4 °F (36 3 °C) (Oral)   Resp 16   SpO2 97%   Gen: No acute distress, resting comfortably in bed  HEENT: Eyes clear, moist mucus membranes, hearing intact  Respiratory: No audible wheezing or stridor  Cardiovascular: Well Perfused peripherally, 2+ distal pulse  Abdomen: nondistended, no peritoneal signs  Musculoskeletal: left upper extremity  · Skin pink, dry, and intact  · Painful range of motion  · Active and passive ROM limited by pain  · Mechanical block to internal appreciated  · Sensation intact to axillary, musculocutaneous, radial, ulna, median nerves  Sensation intact to light touch over the deltoid  · Motor intact to musculocutaneous, radial, ulnar, and median nerves  Intact deltoid twitch  · 2+ radial and ulnar pulse, symmetric b/l  Digits WWP  Cap refill < 2sec  · Musculature is soft and compressible, no pain with passive stretch    Radiology:   I personally reviewed the films  X-rays AP/Lateral and 2views of left shoulder shows dislocated reverse total shoulder arthroplasty  Procedure- Orthopedics   Geetha Rivera 65 y o  female MRN: [id number]  Unit/Bed#: Cat Scan    Procedure note: Shoulder reduction    After informed consent was obtained and a time out was performed, pt underwent conscious sedation performed by the emergency department  Pts shoulder was reduced using a gentle traction/countertraction maneuver  Post reduction there were no blocks to motion or instability of the glenohumeral joint  Pt was placed in a sling  Post reduction x rays were ordered and demonstrated no interval improvement  Pt tolerated the procedure well and was neurovascularly intact pre and post procedure  Given prior hx of dislocation, no further attempts were made to relocate rTSA  Assessment:  65 y o  female with left rTSA dislocation in the setting of 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

## 2022-09-26 NOTE — ED PROVIDER NOTES
History  Chief Complaint   Patient presents with    Fall     Fall, + headstrike, no thinners, no LOC  Lost balance when trying to get into recliner     59-year-old female past medical history significant for hypothyroidism, anxiety, depression, osteoporosis    Was getting up to walk in the living room and fell and hit her head  No LOC  No dizziness prior to the fall  Currently patient is denying any palpitations, chest pain, nausea vomiting, shortness of breath  She she is only complaining of pain in the posterior head as well as in her shoulder  She is resistant to move the left shoulder  Prior to Admission Medications   Prescriptions Last Dose Informant Patient Reported? Taking?    Ascorbic Acid, Vitamin C, (VITAMIN C) 100 MG tablet  Self Yes No   Sig: Take 400 mg by mouth daily   MELATONIN PO   Yes No   Sig: Take by mouth   Multiple Vitamins-Minerals (CENTRUM SILVER PO)  Self Yes No   Sig: Take 1 tablet by mouth daily   OXYCODONE HCL PO   Yes No   Sig: Take by mouth   acetaminophen (TYLENOL) 650 mg CR tablet  Self No No   Sig: Take 1 tablet (650 mg total) by mouth every 8 (eight) hours as needed for mild pain   ferrous gluconate (FERGON) 324 mg tablet   No No   Sig: Take 1 tablet (324 mg total) by mouth in the morning   folic acid (FOLVITE) 1 mg tablet  Self Yes No   Sig: Take 2,000 mcg by mouth daily   hydroxychloroquine (PLAQUENIL) 200 mg tablet  Self Yes No   Si pill am 1/2 pill pm   levothyroxine 75 mcg tablet   No No   Sig: Take 0 5 tablets (37 5 mcg total) by mouth daily in the early morning   meloxicam (Mobic) 7 5 mg tablet  Self No No   Sig: Take 1 tablet (7 5 mg total) by mouth daily   methocarbamol (ROBAXIN) 750 mg tablet   No No   Sig: Take 1 tablet (750 mg total) by mouth 2 (two) times a day as needed for muscle spasms   methotrexate 2 5 MG tablet  Self No No   Sig: 3 tablets by mouth once a week on Thursday   nortriptyline (PAMELOR) 50 mg capsule  Self No No   Sig: Take 2 capsules (100 mg total) by mouth daily at bedtime   predniSONE 5 mg tablet  Self Yes No   Sig: Take 5 mg by mouth daily   rOPINIRole (REQUIP) 2 mg tablet   No No   Sig: TAKE (1) TABLET BY MOUTH AT BEDTIME    senna-docusate sodium (SENOKOT S) 8 6-50 mg per tablet   No No   Sig: Take 1 tablet by mouth 2 (two) times a day      Facility-Administered Medications: None       Past Medical History:   Diagnosis Date    Acute blood loss anemia 9/9/2020    Aftercare following joint replacement 9/9/2020    Patient had right reversed total shoulder arthroplasty   Pain was controlled when he left the hospital       Anxiety     Arthritis     Depression     Disease of thyroid gland     HYPO    Femur neck fracture (HCC)     GERD (gastroesophageal reflux disease)     Hyperthyroidism     RESOLVED: [id number]    Osteoporosis     Polio     [facility]     Right BBB/left ant fasc block     UTI (urinary tract infection)     Varicella infection     LAST ASSESSED: [id number]       Past Surgical History:   Procedure Laterality Date    APPENDECTOMY      HIP ARTHROPLASTY Left 11/27/2017    Procedure: REMOVAL IM NAIL CONVERSION TO TOTAL HIP ARTHROPLASTY POSTERIOR APPROACH;  Surgeon: Kvng Alvarez MD;  [facility];  Service: Orthopedics    HIP FRACTURE SURGERY      HIP SURGERY      both hips replaced;2013 left ,2014 right    JOINT REPLACEMENT Right     HIP TOTAL    CO CONV PREV HIP SURG TO TOT HIP Manjinder Rad Left 10/4/2017    Procedure: Zulema Kelley removal of left hip;  Surgeon: Kvng Alvarez MD;  [facility];  Service: Orthopedics    CO RECONSTR TOTAL SHOULDER IMPLANT Right 9/2/2020    Procedure: ARTHROPLASTY SHOULDER REVERSE;  Surgeon: Rebel Schroeder MD;  [facility];  Service: Orthopedics    CO [address] IMPLANT Left 6/1/2021    Procedure: ARTHROPLASTY SHOULDER REVERSE;  Surgeon: Rebel Schroeder MD;  [facility];  Service: Orthopedics    CO WRIST Adrienne Small LIG Right 5/24/2022    Procedure: RELEASE CARPAL TUNNEL ENDOSCOPIC-right;  Surgeon: Sara Haas MD;  [facility];  Service: Orthopedics    REVISION TOTAL HIP ARTHROPLASTY Right     TOE AMPUTATION Left 7/24/2022    Procedure: 5TH METATARSAL RESECTION WITH BOTTOM FLAP CLOSURE;  Surgeon: Violet Thrasher DPM;  [facility];  Service: Podiatry    TONSILLECTOMY         Family History   Problem Relation Age of Onset    Rheum arthritis Mother     Rheum arthritis Sister     Prostate cancer Brother      I have reviewed and agree with the history as documented  E-Cigarette/Vaping    E-Cigarette Use Never User      E-Cigarette/Vaping Substances    Nicotine No     THC No     CBD No     Flavoring No     Other No     Unknown No      Social History     Tobacco Use    Smoking status: Former Smoker    Smokeless tobacco: Never Used    Tobacco comment: quit 20-30 years ago   Vaping Use    Vaping Use: Never used   Substance Use Topics    Alcohol use: Not Currently    Drug use: Not Currently        Review of Systems   Constitutional: Negative for chills and fever  HENT: Negative for hearing loss  Eyes: Negative for visual disturbance  Respiratory: Negative for shortness of breath  Cardiovascular: Negative for chest pain  Gastrointestinal: Negative for abdominal pain, constipation, diarrhea, nausea and vomiting  Genitourinary: Negative for difficulty urinating  Musculoskeletal: Negative for myalgias  Skin: Negative for color change  Neurological: Negative for dizziness and headaches  Psychiatric/Behavioral: Negative for agitation  All other systems reviewed and are negative        Physical Exam  ED Triage Vitals   Temperature Pulse Respirations Blood Pressure SpO2   09/25/22 2115 09/25/22 2115 09/25/22 2115 09/25/22 2115 09/25/22 2115   (!) 97 4 °F (36 3 °C) 80 16 168/78 97 %      Temp Source Heart Rate Source Patient Position - Orthostatic VS BP Location FiO2 (%)   09/25/22 2115 09/25/22 2115 09/25/22 2357 09/25/22 2357 --   Oral Monitor Lying Right arm       Pain Score       09/25/22 2146       8             Orthostatic Vital Signs  Vitals:    09/26/22 0020 09/26/22 0022 09/26/22 0200 09/26/22 0300   BP: (!) 187/93 (!) 177/103 145/69 105/55   Pulse: (!) 113 (!) 111 82 78   Patient Position - Orthostatic VS:    Lying       Physical Exam  Vitals and nursing note reviewed  Constitutional:       General: She is not in acute distress  Appearance: Normal appearance  She is well-developed  She is not ill-appearing  HENT:      Head: Normocephalic and atraumatic  Right Ear: External ear normal       Left Ear: External ear normal       Nose: Nose normal  No congestion  Mouth/Throat:      Mouth: Mucous membranes are moist       Pharynx: Oropharynx is clear  No oropharyngeal exudate  Eyes:      General:         Right eye: No discharge  Left eye: No discharge  Extraocular Movements: Extraocular movements intact  Conjunctiva/sclera: Conjunctivae normal       Pupils: Pupils are equal, round, and reactive to light  Cardiovascular:      Rate and Rhythm: Normal rate and regular rhythm  Heart sounds: Normal heart sounds  No murmur heard  No friction rub  No gallop  Pulmonary:      Effort: Pulmonary effort is normal  No respiratory distress  Breath sounds: Normal breath sounds  No stridor  No wheezing  Abdominal:      General: Bowel sounds are normal  There is no distension  Palpations: Abdomen is soft  Tenderness: There is no abdominal tenderness  Musculoskeletal:         General: No swelling  Normal range of motion  Cervical back: Normal range of motion and neck supple  No rigidity  Comments: No C, T, L-spine tenderness  Obvious deformity of the left shoulder  Skin:     General: Skin is warm and dry  Capillary Refill: Capillary refill takes less than 2 seconds     Neurological: General: No focal deficit present  Mental Status: She is alert and oriented to person, place, and time  Mental status is at baseline  Cranial Nerves: No cranial nerve deficit  Motor: No weakness  Psychiatric:         Mood and Affect: Mood normal          Behavior: Behavior normal          ED Medications  Medications   acetaminophen (TYLENOL) tablet 975 mg (975 mg Oral Given 9/25/22 2146)   rOPINIRole (REQUIP) tablet 2 mg (2 mg Oral Given 9/25/22 2221)   oxyCODONE (ROXICODONE) IR tablet 5 mg (5 mg Oral Given 9/25/22 2334)   propofol (DIPRIVAN) 200 MG/20ML bolus injection 100 mg (100 mg Intravenous Given 9/26/22 0009)   LORazepam (ATIVAN) injection 1 mg (1 mg Intravenous Given 9/26/22 0220)       Diagnostic Studies  Results Reviewed     None                 CT head without contrast   Final Result by Shiloh Mckeon MD (09/25 2238)      No acute intracranial abnormality  Workstation performed: [id number]         CT spine cervical without contrast   Final Result by Shiloh Mckeon MD (09/25 2231)      No cervical spine fracture or traumatic malalignment  Workstation performed: [id number]         XR shoulder 2+ views LEFT    (Results Pending)   XR shoulder 1 vw left    (Results Pending)   XR shoulder 1 vw left    (Results Pending)         Procedures  Pre-Procedural Sedation  Performed by: Arnie Vidal MD  Authorized by: Arnie Vidal MD     Consent:     Consent obtained:  Verbal and written    Consent given by:  Patient    Risks discussed:   Allergic reaction, inadequate sedation, dysrhythmia, nausea, vomiting, respiratory compromise necessitating ventilatory assistance and intubation, prolonged sedation necessitating reversal and prolonged hypoxia resulting in organ damage    Alternatives discussed:  Analgesia without sedation  Universal protocol:     Patient identity confirmation method:  Verbally with patient and arm band  Indications:     Sedation purpose:  Dislocation reduction    Procedure necessitating sedation performed by:  Different physician    Intended level of sedation:  Moderate (conscious sedation)  Pre-sedation assessment:     NPO status caution: unable to specify NPO status      ASA classification: class 3 - patient with severe systemic disease      Neck mobility: normal      Mouth opening:  3 or more finger widths    Mallampati score:  II - soft palate, uvula, fauces visible    Pre-sedation assessments completed and reviewed: airway patency, cardiovascular function, hydration status, mental status (0925/22), nausea/vomiting, pain level, respiratory function and temperature      History of difficult intubation: no      Pre-sedation assessment completed:  9/25/2022 11:50 PM  Procedural Sedation    Date/Time: 9/26/2022 3:48 AM  Performed by: Selam Wolff MD  Authorized by:  Selam Wolff MD     Immediate pre-procedure details:     Reassessment: Patient reassessed immediately prior to procedure      Reviewed: vital signs      Verified: bag valve mask available, emergency equipment available, intubation equipment available, IV patency confirmed, oxygen available, reversal medications available and suction available    Procedure details (see MAR for exact dosages):     Sedation start time:  9/25/2022 11:58 PM    Preoxygenation:  Nasal cannula    Sedation:  Propofol    Intra-procedure monitoring:  Blood pressure monitoring, cardiac monitor, continuous pulse oximetry, continuous capnometry, frequent LOC assessments and frequent vital sign checks    Intra-procedure events: none      Sedation end time:  9/26/2022 12:15 PM    Total sedation time (minutes):  17  Post-procedure details:     Post-sedation assessment completed:  9/26/2022 12:20 PM    Attendance: Constant attendance by certified staff until patient recovered      Recovery: Patient returned to pre-procedure baseline      Post-sedation assessments completed and reviewed: airway patency, cardiovascular function, hydration status, mental status, nausea/vomiting, pain level, respiratory function and temperature      Patient is stable for discharge or admission: yes      Patient tolerance: Tolerated well, no immediate complications          ED Course               Identification of Seniors at Risk    Flowsheet Row Most Recent Value   (ISAR) Identification of Seniors at Risk    Before the illness or injury that brought you to the Emergency, did you need someone to help you on a regular basis? 0 Filed at: 09/25/2022 2112   In the last 24 hours, have you needed more help than usual? 0 Filed at: 09/25/2022 2112   Have you been hospitalized for one or more nights during the past 6 months? 1 Filed at: 09/25/2022 2112   In general, do you see well? 0 Filed at: 09/25/2022 2112   In general, do you have serious problems with your memory? 0 Filed at: 09/25/2022 2112   Do you take more than three different medications every day? 1 Filed at: 09/25/2022 2112   ISAR Score 2 Filed at: 09/25/2022 2112                    SBIRT 22yo+    Flowsheet Row Most Recent Value   SBIRT (25 yo +)    In order to provide better care to our patients, we are screening all of our patients for alcohol and drug use  Would it be okay to ask you these screening questions? No Filed at: 09/25/2022 2239                MDM  Number of Diagnoses or Management Options  Shoulder dislocation, left, initial encounter  Diagnosis management comments: 67-year-old female presenting to ED today for fall from standing  At this time will do x-ray of the left shoulder as well as CT head and CT C-spine  Patient was found to have a dislocation of left shoulder  Ortho was consulted for assistance with dislocation reduction  We did the sedation part of the reduction and they were doing the reduction  Reduction was unsuccessful by Ortho  Patient was observed until procedural sedation medication wore off  Patient to follow-up with orthopedics in the office in 2 weeks    CT head and C-spine were negative  Strict return to ER precautions were given and the patient was discharged home  Disposition  Final diagnoses:   Shoulder dislocation, left, initial encounter     Time reflects when diagnosis was documented in both MDM as applicable and the Disposition within this note     Time User Action Codes Description Comment    9/25/2022 10:37 PM Kye Sheehan Add [[id number]] Shoulder dislocation, left, initial encounter       ED Disposition     ED Disposition   Discharge    Condition   Stable    Date/Time   Mon Sep 26, 2022  1:01 AM    Comment   Geetha Rivera discharge to home/self care  Follow-up Information     Follow up With Specialties Details Why [address], MD Orthopedic Surgery Follow up in 2 week(s) Please call to make an appointment to be seen by Dr Anoop Sales in 2 weeks  [address] [facility] Emergency Department Emergency Medicine Go to  If symptoms worsen, As needed [address] [phone]  [address] Emergency Department, [address], [address]          Patient's Medications   Discharge Prescriptions    No medications on file     No discharge procedures on file  PDMP Review       Value Time User    PDMP Reviewed  Yes 7/22/2022 12:00 AM Fabio Granados           ED Provider  Attending physically available and evaluated Geetha Rivera I managed the patient along with the ED Attending      Electronically Signed by         Blessing Barclay MD  09/26/22 0096

## 2022-09-26 NOTE — ED NOTES
[facility] contacted to arrange BLS transport for pt home     [facility] call back when a P/U time is scheduled      Pooja Crawley  09/26/22 0112

## 2022-09-26 NOTE — ED ATTENDING ATTESTATION
9/25/2022  IClay MD, saw and evaluated the patient  I have discussed the patient with the resident/non-physician practitioner and agree with the resident's/non-physician practitioner's findings, Plan of Care, and MDM as documented in the resident's/non-physician practitioner's note, except where noted  All available labs and Radiology studies were reviewed  I was present for key portions of any procedure(s) performed by the resident/non-physician practitioner and I was immediately available to provide assistance  At this point I agree with the current assessment done in the Emergency Department  I have conducted an independent evaluation of this patient a history and physical is as follows:    69-year-old female with past medical history of arthritis, anxiety, osteoporosis, restless leg syndrome, bilateral right reverse total shoulder arthroplasty, presenting following a mechanical fall  Patient reports walking in the living room, tripping and falling forward, striking her head and left shoulder  She complains of headache as well as pain in her left shoulder  No double vision  No dizziness  No chest pain  No shortness of breath  No nausea or vomiting  Patient reports severe pain in her bilateral legs due to underlying restless leg syndrome  She is continuously moving her bilateral lower extremities  No recent illnesses, no fevers or chills  BP (!) 177/103   Pulse (!) 111   Temp (!) 97 4 °F (36 3 °C) (Oral)   Resp 20   SpO2 100%      Vital signs and nursing notes reviewed    CONSTITUTIONAL: female appearing stated age resting in bed, appears in distress to bilateral lower extremity restless like discomfort  HEENT: atraumatic, normocephalic  Sclera anicteric, conjunctiva are not injected  Moist oral mucosa  CARDIOVASCULAR/CHEST:  Tachycardic, regular, no M/R/G  2+ radial pulses  PULMONARY: Breathing comfortably on RA   Breath sounds are equal and clear to auscultation  ABDOMEN: non-distended  BS present, normoactive  Non-tender  MSK:  There is no midline C-spine tenderness  Examination of right upper extremity reveals a healed shoulder surgical incision, there is a prominent acromion, however, patient is able to range her right upper extremity without difficulty  Examination of left upper extremity reveals a healed shoulder surgical incision with humeral head palpated inferiorly and medially and obviously dislocated  Patient is unable to spontaneously range the shoulder  She reports significant pain in her left shoulder  5/5 hand  bilaterally  2+ radial pulses  Sensation to light touch is intact in axillary, median, radial, and ulnar nerve distributions bilaterally  Bilateral lower extremities  NEURO: Awake, alert, and oriented x 3  Face symmetric  Moves all extremities spontaneously  No focal neurologic deficits  SKIN: Warm, appears well-perfused  MENTAL STATUS: Normal affect    Labs Reviewed - No data to display  XR shoulder 1 vw left   Final Result      Attempted reduction of anterior medial dislocation of left glenohumeral arthroplasty was again unsuccessful  Alignment is unchanged  Reidentified loosening of the scapular component of the prosthesis, though unchanged in position  Chronic widening of the left acromioclavicular joint  Workstation performed: [id number]         XR shoulder 1 vw left   Final Result      Attempted reduction of anterior medial dislocation of left glenohumeral arthroplasty was unsuccessful  Alignment is unchanged  Reidentified loosening of the scapular component of the prosthesis, though unchanged in position  Chronic widening of the left acromioclavicular joint  Workstation performed: [id number]         CT head without contrast   Final Result      No acute intracranial abnormality                    Workstation performed: [id number]         CT spine cervical without contrast   Final Result      No cervical spine fracture or traumatic malalignment  Workstation performed: [id number]         XR shoulder 2+ views LEFT   Final Result      Anteromedial glenohumeral dislocation in a patient with a left total shoulder arthroplasty  Reidentified loosening of the scapular component of the prosthesis, though unchanged in position  Chronic widening of the left acromioclavicular joint  Workstation performed: [id number]               ED Course     Medications   acetaminophen (TYLENOL) tablet 975 mg (975 mg Oral Given 9/25/22 2146)   rOPINIRole (REQUIP) tablet 2 mg (2 mg Oral Given 9/25/22 2221)   oxyCODONE (ROXICODONE) IR tablet 5 mg (5 mg Oral Given 9/25/22 2334)   propofol (DIPRIVAN) 200 MG/20ML bolus injection 100 mg (100 mg Intravenous Given 9/26/22 0009)   LORazepam (ATIVAN) injection 1 mg (1 mg Intravenous Given 9/26/22 180)     30-year-old female presenting with left shoulder pain in setting of a fall, as well as bilateral lower extremity pain in setting of restless leg syndrome  Vital signs reviewed, quite hypertensive, tachycardic, within normal limits otherwise  Concerning fall, this appears to be mechanical in nature  CT head, CT C-spine, as well as x-rays of left shoulder pursued  CT head and CT C-spine revealed no acute traumatic injuries  X-ray of left shoulder reveals dislocation of the left total shoulder arthroplasty  Tylenol administered for pain  Repeat oral administered for restless leg syndrome  Case discussed with Orthopedics, orthopedic consultant attempted to reduce the shoulder at bedside, however, patient was in distress and was unable to tolerate attempted reduction  As such, will proceed with procedural sedation with plan for orthopedics to attempt to reduce the left shoulder  Following discussion of risks, benefits, and alternatives with the patient, she would like to proceed with procedural sedation    This was performed the patient tolerated the procedure without difficulty  Unfortunately, left shoulder could not be reduced  Record review reveals that this may be a more chronic issue  At this time, patient is deemed safe to discharge to home with recommendations to follow-up with orthopedics, recommendations for pain control, and return precautions        Critical Care Time  Procedures

## 2022-09-27 DIAGNOSIS — G25.81 RLS (RESTLESS LEGS SYNDROME): ICD-10-CM

## 2022-10-03 DIAGNOSIS — G25.81 RLS (RESTLESS LEGS SYNDROME): ICD-10-CM

## 2022-10-03 DIAGNOSIS — M62.838 MUSCLE SPASM: Primary | ICD-10-CM

## 2022-10-03 DIAGNOSIS — G47.09 OTHER INSOMNIA: ICD-10-CM

## 2022-10-03 DIAGNOSIS — G25.81 RESTLESS LEGS SYNDROME: ICD-10-CM

## 2022-10-04 RX ORDER — NORTRIPTYLINE HYDROCHLORIDE 50 MG/1
100 CAPSULE ORAL
Qty: 90 CAPSULE | Refills: 0 | Status: SHIPPED | OUTPATIENT
Start: 2022-10-04

## 2022-10-04 RX ORDER — ROPINIROLE 2 MG/1
2 TABLET, FILM COATED ORAL
Qty: 90 TABLET | Refills: 0 | Status: SHIPPED | OUTPATIENT
Start: 2022-10-04

## 2022-10-04 RX ORDER — METHOCARBAMOL 750 MG/1
750 TABLET, FILM COATED ORAL 2 TIMES DAILY PRN
Qty: 30 TABLET | Refills: 0 | Status: SHIPPED | OUTPATIENT
Start: 2022-10-04

## 2022-10-04 NOTE — TELEPHONE ENCOUNTER
PCP out of office    Patient requesting refill of chronic medications    Kidney function normal    Patient has appt with PCP in November    All rx re-ordered for one time fill in PCP abscense

## 2022-10-06 ENCOUNTER — TELEPHONE (OUTPATIENT)
Dept: FAMILY MEDICINE CLINIC | Facility: CLINIC | Age: 77
End: 2022-10-06

## 2022-10-11 PROBLEM — Z00.00 ENCOUNTER FOR ANNUAL WELLNESS EXAM IN MEDICARE PATIENT: Status: RESOLVED | Noted: 2021-06-14 | Resolved: 2022-10-11

## 2022-10-12 ENCOUNTER — OFFICE VISIT (OUTPATIENT)
Dept: FAMILY MEDICINE CLINIC | Facility: CLINIC | Age: 77
End: 2022-10-12
Payer: MEDICARE

## 2022-10-12 VITALS
HEIGHT: 58 IN | HEART RATE: 80 BPM | BODY MASS INDEX: 18.89 KG/M2 | TEMPERATURE: 98.9 F | WEIGHT: 90 LBS | SYSTOLIC BLOOD PRESSURE: 102 MMHG | DIASTOLIC BLOOD PRESSURE: 58 MMHG | RESPIRATION RATE: 16 BRPM

## 2022-10-12 DIAGNOSIS — S91.309A NON-HEALING OPEN WOUND OF HEEL, INITIAL ENCOUNTER: Primary | ICD-10-CM

## 2022-10-12 PROCEDURE — 99214 OFFICE O/P EST MOD 30 MIN: CPT | Performed by: NURSE PRACTITIONER

## 2022-10-12 NOTE — PROGRESS NOTES
[facility] - [facility]    NAME: Geetha Rivera  AGE: 65 y o  SEX: female  : 1945     DATE: 10/12/2022     Assessment and Plan:     Problem List Items Addressed This Visit        Other    Non-healing open wound of heel, initial encounter - Primary         The patient presents to the office today accompanied by a family member with a concern of a nonhealing left heel pressure ulcer  This has been present for 2 weeks  They do have a visiting nurse that comes to the home and recommended a hydrocolloid dressing she which is currently in place  She changes this weekly  According to the family member this wound is improving  Upon exam it does measure approximately 10 mm in diameter  There is no drainage  Patient does have diffuse vascular disease and has had issues with wound healing in the past   She was following with Podiatry  I did recommend the patient follow-up with her podiatry presently  This wound may need to be debrided  I will send a message to her podiatrist via [employer] to make him aware  Patient has been provided information on her after visit summary regarding prevention of pressure ulcers         Relevant Orders    Ambulatory Referral to Podiatry              No follow-ups on file  Chief Complaint:     Chief Complaint   Patient presents with   • Wound Check     Open sore on heel of left foot for almost 2 weeks        History of Present Illness: The patient presents to the office today accompanied by her granddaughter for concern of a nonhealing pressure ulcer on her left heel  This is discussed in the above assessment and plan  Photo was taken and is in epic under media  I will refer the patient to her podiatrist for further management  This area does not look infected  The patient does have diffuse vascular disease in her bilateral lower extremities    Information regarding prevention of pressure ulcers was provided to the patient     Review of Systems:     Review of Systems   Constitutional: Negative for activity change, fatigue and fever  HENT: Negative for congestion, hearing loss, rhinorrhea, trouble swallowing and voice change  Eyes: Negative for photophobia, pain, discharge and visual disturbance  Respiratory: Negative for cough, chest tightness and shortness of breath  Cardiovascular: Negative for chest pain, palpitations and leg swelling  Gastrointestinal: Negative for abdominal pain, blood in stool, constipation, nausea and vomiting  Endocrine: Negative for cold intolerance and heat intolerance  Genitourinary: Negative for difficulty urinating, frequency, hematuria, urgency, vaginal bleeding and vaginal discharge  Musculoskeletal: Negative for arthralgias and myalgias  Skin: Positive for wound  Neurological: Negative for dizziness, weakness, numbness and headaches  Psychiatric/Behavioral: Negative for decreased concentration  The patient is not nervous/anxious           Problem List:     Patient Active Problem List   Diagnosis   • Anxiety   • RLS (restless legs syndrome)   • S/P total hip arthroplasty   • Hypothyroidism due to Hashimoto's thyroiditis   • Age-related osteoporosis without current pathological fracture   • RBBB   • Ambulatory dysfunction   • Closed compression fracture of L3 lumbar vertebra   • Rheumatoid arthritis involving multiple sites with positive rheumatoid factor ([facility])   • Chronic pain syndrome   • Vitamin D deficiency   • Dyspepsia   • Other forms of systemic lupus erythematosus ([address] )   • Depression   • Acquired subluxation of left shoulder   • Rupture long head biceps tendon, left, initial encounter   • S/P reverse total shoulder arthroplasty, left   • Shoulder pain, bilateral   • Failed orthopedic implant ([address] )   • Venous insufficiency   • Pyogenic inflammation of bone ([facility])   • Hyponatremia   • Osteomyelitis of left foot ([facility])   • Anemia   • Pressure injury of left foot, stage 4 ([facility])   • Pain   • Non-healing open wound of heel, initial encounter        Objective:     /58   Pulse 80   Temp 98 9 °F (37 2 °C) (Tympanic)   Resp 16   Ht 4' 10" (1 473 m)   Wt 40 8 kg (90 lb)   BMI 18 81 kg/m²     Current Outpatient Medications   Medication Sig Dispense Refill   • acetaminophen (TYLENOL) 650 mg CR tablet Take 1 tablet (650 mg total) by mouth every 8 (eight) hours as needed for mild pain 30 tablet 0   • Ascorbic Acid, Vitamin C, (VITAMIN C) 100 MG tablet Take 400 mg by mouth daily     • ferrous gluconate (FERGON) 324 mg tablet Take 1 tablet (324 mg total) by mouth in the morning 90 tablet 0   • folic acid (FOLVITE) 1 mg tablet Take 2,000 mcg by mouth daily     • hydroxychloroquine (PLAQUENIL) 200 mg tablet 1 pill am 1/2 pill pm     • levothyroxine 75 mcg tablet Take 0 5 tablets (37 5 mcg total) by mouth daily in the early morning 60 tablet 0   • MELATONIN PO Take by mouth     • meloxicam (Mobic) 7 5 mg tablet Take 1 tablet (7 5 mg total) by mouth daily 30 tablet 0   • methocarbamol (ROBAXIN) 750 mg tablet Take 1 tablet (750 mg total) by mouth 2 (two) times a day as needed for muscle spasms (may cause drowsiness) 30 tablet 0   • methotrexate 2 5 MG tablet 3 tablets by mouth once a week on Thursday     • Multiple Vitamins-Minerals (CENTRUM SILVER PO) Take 1 tablet by mouth daily     • nortriptyline (PAMELOR) 50 mg capsule Take 2 capsules (100 mg total) by mouth daily at bedtime 90 capsule 0   • OXYCODONE HCL PO Take by mouth     • predniSONE 5 mg tablet Take 5 mg by mouth daily     • rOPINIRole (REQUIP) 2 mg tablet Take 1 tablet (2 mg total) by mouth daily at bedtime 90 tablet 0   • senna-docusate sodium (SENOKOT S) 8 6-50 mg per tablet Take 1 tablet by mouth 2 (two) times a day 20 tablet 0     No current facility-administered medications for this visit  Physical Exam  Vitals reviewed  Constitutional:       Appearance: Normal appearance  HENT:      Head: Normocephalic        Nose: Nose normal  Mouth/Throat:      Mouth: Mucous membranes are moist       Pharynx: Oropharynx is clear  Eyes:      Extraocular Movements: Extraocular movements intact  Pupils: Pupils are equal, round, and reactive to light  Cardiovascular:      Rate and Rhythm: Normal rate and regular rhythm  Pulmonary:      Effort: Pulmonary effort is normal       Breath sounds: Normal breath sounds  Musculoskeletal:         General: Normal range of motion  Feet:    Feet:      Left foot:      Skin integrity: Ulcer and skin breakdown present  Skin:     General: Skin is warm and dry  Neurological:      General: No focal deficit present  Mental Status: She is alert and oriented to person, place, and time  Psychiatric:         Mood and Affect: Mood normal          Behavior: Behavior normal          Thought Content:  Thought content normal          Judgment: Judgment normal          Nataly Anglin

## 2022-10-12 NOTE — PATIENT INSTRUCTIONS
Team Member Role and Specialty Contact Info Address Start End Comments   [facility] (Podiatry) Phone: [phone] Fax: [phone]  Reinaldo Brenden Torres [facility] [id number] 10/12/2022 - -     How to Prevent Pressure Injuries   WHAT YOU NEED TO KNOW:   A pressure injury is an injury to the skin or tissue over a bony area  A pressure injury is also called a pressure sore, bedsore, wound, or decubitus ulcer  Pressure injuries can form over any area but are most common on the back, buttocks, hips, and heels  DISCHARGE INSTRUCTIONS:   Contact your healthcare provider if:   You have a fever  You see red or purple skin over a bony area  You see a blister or open sore over a bony area  Your skin feels warm, spongy, or tight  You have new pain, or pain that is getting worse  You have questions or concerns about your condition or care  Help prevent a pressure injury:       Check your skin several times each day  Check for red skin, or other color changes, over bony areas  Use a mirror if you have trouble seeing certain areas, or ask another person to look  Change wet bedding and clothes right away  The wet material may rub against your skin and cause damage  Change your position often  Change your position every 2 hours if you are in a bed all day  Change your position every hour if you are in a wheelchair all day  Set an alarm to help remind you when it is time to turn  Keep a written turning schedule to help you remember to turn  If you are helping a person move in bed, lift him  Do not slide him  Keep the head of the bed as low as possible  This may help prevent damage to the skin from sliding down in bed  Protect the skin over bony areas  Use pillows or foam wedges to keep bony areas from touching, and to relieve pressure  For example, put a pillow or foam wedge between your knees to keep them from pressing on one another   Place a pillow or foam wedge under you to keep your hip raised when you lie on your side  Do not rest directly on your hipbone  Put a foam pad or pillow under your legs from calf to ankle when you lie on your back  The pad or pillow should raise your heels so that they are not touching the bed  Use medical equipment and pads  A draw sheet or large pad under you may help others move you up in bed  An overhead trapeze can help you change positions in bed  Mattresses and overlays made to provide more cushioning may help decrease the risk of pressure injuries  Examples include a foam mattress pad and air or water mattresses  Ask about equipment that may be right for you and how you use it  Keep your skin clean, dry, and moisturized  Use mild soap and warm water to clean your skin  Do not rub or scrub when you wash  Do not use products that contain alcohol, because they can dry out your skin  Gently pat your skin dry  Do not rub your skin with a towel  Apply lotion or a moisturizer on your skin often  Eat a variety of healthy foods  Healthy foods include fruits, vegetables, whole-grain breads, and fish  Foods that are high in protein may help your pressure injury heal  This includes lean meats, beans, milk, yogurt, and cheese  Nutrition shakes may also give you extra calories and protein if you have trouble eating or are underweight  Follow up with your doctor as directed:  Write down your questions so you remember to ask them during your visits  © [employer] 2022 Information is for End User's use only and may not be sold, redistributed or otherwise used for commercial purposes  All illustrations and images included in CareNotes® are the copyrighted property of A D A M , Inc  or [facility] Arlen Tapia   The above information is an  only  It is not intended as medical advice for individual conditions or treatments  Talk to your doctor, nurse or pharmacist before following any medical regimen to see if it is safe and effective for you

## 2022-10-12 NOTE — ASSESSMENT & PLAN NOTE
The patient presents to the office today accompanied by a family member with a concern of a nonhealing left heel pressure ulcer  This has been present for 2 weeks  They do have a visiting nurse that comes to the home and recommended a hydrocolloid dressing she which is currently in place  She changes this weekly  According to the family member this wound is improving  Upon exam it does measure approximately 10 mm in diameter  There is no drainage  Patient does have diffuse vascular disease and has had issues with wound healing in the past   She was following with Podiatry  I did recommend the patient follow-up with her podiatry presently  This wound may need to be debrided  I will send a message to her podiatrist via [employer] to make him aware    Patient has been provided information on her after visit summary regarding prevention of pressure ulcers

## 2022-10-13 ENCOUNTER — OFFICE VISIT (OUTPATIENT)
Dept: OBGYN CLINIC | Facility: OTHER | Age: 77
End: 2022-10-13
Payer: MEDICARE

## 2022-10-13 VITALS — BODY MASS INDEX: 18.89 KG/M2 | HEIGHT: 58 IN | WEIGHT: 90 LBS

## 2022-10-13 DIAGNOSIS — M24.412 CHRONIC DISLOCATION OF SHOULDER, LEFT: ICD-10-CM

## 2022-10-13 DIAGNOSIS — T84.028A INSTABILITY OF REVERSE TOTAL ARTHROPLASTY OF LEFT SHOULDER (HCC): Primary | ICD-10-CM

## 2022-10-13 DIAGNOSIS — Z96.612 INSTABILITY OF REVERSE TOTAL ARTHROPLASTY OF LEFT SHOULDER (HCC): Primary | ICD-10-CM

## 2022-10-13 PROCEDURE — 99214 OFFICE O/P EST MOD 30 MIN: CPT | Performed by: ORTHOPAEDIC SURGERY

## 2022-10-13 NOTE — PROGRESS NOTES
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    Plan:    I again reviewed the plan with the patient and her family member who was present for the previous visits  Unfortunately her glenoid was unable to accommodate the base plate with her postoperative injury and I do not feel that revision is possible with the amount of bone remaining in the glenoid  We did discuss again that resection arthroplasty is and a reasonable alternative but given the patient's functional status and her ability to abduct to almost 80° without significant pain I do not feel that would benefit her at all  This is consistent with her previous evaluation I also recommend she does not undergo any further close reductions as they would not be likely to be successful  She will follow up on an as-needed basis  She does have chronic pain in multiple locations and she will discuss medical pain management with the pain management physicians  Assessment  Diagnoses and all orders for this visit:    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)    Chronic dislocation of shoulder, left reverse total shoulder arthroplasty     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       Discussion and Plan:    The patient recently underwent a closed reduction attempt of her left reverse total shoulder arthroplasty despite previous notes suggesting the  futility of this maneuver  The patient's glenoid baseplate has failed and extensive discussion has been had with the patient and family regarding a resection arthroplasty of the shoulder and it is not felt this would be any more functional or pain relieving than non-operative management at this time   The benefit do not outweigh the risks in this regard and the patient and her family is in agreement to this  She was encouraged to follow up with pain management specialists and primary care provider to assist in her pain control     Subjective:   Patient ID: David Rivas is a 65 y o  female      The patient presents with a chief complaint of left shoulder pain  The pain began a few week(s) ago and is associated with an acute injury in the form of a ground level fall  The patient describes the pain as sharp in intensity,  constant in timing, and localizes the pain to the  left glenohumeral joint  The pain is worse with lying down, movement, overuse and sitting and relieved by nothing  The pain is not associated with numbness and tingling  The pain is not associated with constitutional symptoms  The patient is awoken at night by the pain  The patient has had  Treatment in the form of previous surgery and attempted closed reduction in the ED by orthopaedic residents  The following portions of the patient's history were reviewed and updated as appropriate: allergies, current medications, past family history, past medical history, past social history, past surgical history and problem list     Review of Systems   Constitutional: Negative for activity change and appetite change  HENT: Negative for congestion  Respiratory: Negative for apnea and chest tightness  Cardiovascular: Negative for chest pain and leg swelling  Gastrointestinal: Negative for abdominal distention and abdominal pain  Musculoskeletal: Positive for arthralgias  Negative for back pain  Neurological: Negative for numbness  Psychiatric/Behavioral: Negative for agitation and behavioral problems  Objective:  Ht 4' 10" (1 473 m)   Wt 40 8 kg (90 lb)   BMI 18 81 kg/m²       Left Shoulder Exam     Tenderness   Left shoulder tenderness location: Globally about shoulder      Range of Motion   Active abduction: 60   Passive abduction: 60   Extension: 30   External rotation: 30   Forward flexion: 50   Left shoulder internal rotation 0 degrees: Did not assess    Internal rotation 90 degrees: 30     Muscle Strength   Left shoulder normal muscle strength: DId not assess due to known chronic dislocation of left rTSA     Other   Erythema: absent  Sensation: normal  Pulse: present     Comments:  Skin intact  No ecchymosis  Mild swelling about shoulder             Physical Exam  Constitutional:       Appearance: Normal appearance  HENT:      Head: Normocephalic  Cardiovascular:      Rate and Rhythm: Normal rate  Pulses: Normal pulses  Pulmonary:      Effort: Pulmonary effort is normal    Abdominal:      General: Abdomen is flat  Palpations: Abdomen is soft  Skin:     Capillary Refill: Capillary refill takes less than 2 seconds  Neurological:      Mental Status: She is alert and oriented to person, place, and time  Psychiatric:         Mood and Affect: Mood normal          Behavior: Behavior normal            I have personally reviewed pertinent films in PACS and my interpretation is as follows      X ray of the left shoulder demonstrates persistent left anteromedial  reverse total shoulder arthroplasty dislocation

## 2022-10-14 PROBLEM — Z96.612 INSTABILITY OF REVERSE TOTAL ARTHROPLASTY OF LEFT SHOULDER (HCC): Status: ACTIVE | Noted: 2022-10-14

## 2022-10-14 PROBLEM — T84.028A INSTABILITY OF REVERSE TOTAL ARTHROPLASTY OF LEFT SHOULDER (HCC): Status: ACTIVE | Noted: 2022-10-14

## 2022-10-14 PROBLEM — M24.412: Status: ACTIVE | Noted: 2021-04-19

## 2022-10-22 ENCOUNTER — HOSPITAL ENCOUNTER (EMERGENCY)
Facility: HOSPITAL | Age: 77
Discharge: HOME | End: 2022-10-23
Attending: EMERGENCY MEDICINE | Admitting: EMERGENCY MEDICINE
Payer: MEDICARE

## 2022-10-22 ENCOUNTER — APPOINTMENT (EMERGENCY)
Dept: RADIOLOGY | Facility: HOSPITAL | Age: 77
End: 2022-10-22
Attending: EMERGENCY MEDICINE
Payer: MEDICARE

## 2022-10-22 ENCOUNTER — APPOINTMENT (EMERGENCY)
Dept: RADIOLOGY | Facility: HOSPITAL | Age: 77
End: 2022-10-22
Payer: MEDICARE

## 2022-10-22 VITALS
OXYGEN SATURATION: 100 % | TEMPERATURE: 97.6 F | SYSTOLIC BLOOD PRESSURE: 122 MMHG | DIASTOLIC BLOOD PRESSURE: 59 MMHG | RESPIRATION RATE: 18 BRPM | HEART RATE: 77 BPM
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
## 2022-10-22 DIAGNOSIS — R07.89 LEFT-SIDED CHEST WALL PAIN: ICD-10-CM

## 2022-10-22 DIAGNOSIS — G89.29 CHRONIC LEFT SHOULDER PAIN: ICD-10-CM

## 2022-10-22 DIAGNOSIS — M25.512 CHRONIC LEFT SHOULDER PAIN: ICD-10-CM

## 2022-10-22 PROCEDURE — 99284 EMERGENCY DEPT VISIT MOD MDM: CPT

## 2022-10-22 PROCEDURE — 71250 CT THORAX DX C-: CPT

## 2022-10-22 PROCEDURE — 72125 CT NECK SPINE W/O DYE: CPT

## 2022-10-22 PROCEDURE — 99284 EMERGENCY DEPT VISIT MOD MDM: CPT | Performed by: EMERGENCY MEDICINE

## 2022-10-22 PROCEDURE — 70450 CT HEAD/BRAIN W/O DYE: CPT

## 2022-10-22 PROCEDURE — 73030 X-RAY EXAM OF SHOULDER: CPT

## 2022-10-22 PROCEDURE — G1004 CDSM NDSC: HCPCS

## 2022-10-22 PROCEDURE — 73060 X-RAY EXAM OF HUMERUS: CPT

## 2022-10-22 RX ORDER — OXYCODONE HYDROCHLORIDE 5 MG/1
5 TABLET ORAL ONCE
Status: COMPLETED | OUTPATIENT
Start: 2022-10-22 | End: 2022-10-22

## 2022-10-22 RX ORDER — METHOCARBAMOL 500 MG/1
500 TABLET, FILM COATED ORAL ONCE
Status: COMPLETED | OUTPATIENT
Start: 2022-10-22 | End: 2022-10-23

## 2022-10-22 RX ADMIN — OXYCODONE HYDROCHLORIDE 5 MG: 5 TABLET ORAL at 20:47

## 2022-10-23 RX ADMIN — METHOCARBAMOL 500 MG: 500 TABLET ORAL at 00:05

## 2022-10-23 NOTE — DISCHARGE INSTRUCTIONS
Your imaging today did not show any new fractures  Take Tylenol 500 mg and ibuprofen 400 mg every 6 hours for pain  Follow-up with your primary doctor

## 2022-10-23 NOTE — ED PROVIDER NOTES
History  Chief Complaint   Patient presents with   • Fall     From home  Mechanical fall, + headstrike, no thinners, no LOC  Denies any issue other than back of head pain     69-year-old female with history of chronically dislocated left shoulder presents with acute on chronic left shoulder pain after mechanical fall at home  Patient states she was walking with her walker when she lost balance falling straight back striking the back of her head on the floor  Patient's only complaint at this time is left shoulder pain described as moderate to severe, nonradiating, and worse since the fall  Denies loss of consciousness, chest pain, palpitations, dyspnea, head pain, neck pain, back pain, abdominal pain, nausea, pelvic pain, or any other MSK pain  Denies any neurological symptoms  History of chronically dislocated left shoulder  Patient is not anticoagulated  No antiplatelet therapy  Prior to Admission Medications   Prescriptions Last Dose Informant Patient Reported? Taking?    Ascorbic Acid, Vitamin C, (VITAMIN C) 100 MG tablet  Self Yes No   Sig: Take 400 mg by mouth daily   MELATONIN PO   Yes No   Sig: Take by mouth   Multiple Vitamins-Minerals (CENTRUM SILVER PO)  Self Yes No   Sig: Take 1 tablet by mouth daily   OXYCODONE HCL PO   Yes No   Sig: Take by mouth   acetaminophen (TYLENOL) 650 mg CR tablet  Self No No   Sig: Take 1 tablet (650 mg total) by mouth every 8 (eight) hours as needed for mild pain   ferrous gluconate (FERGON) 324 mg tablet   No No   Sig: Take 1 tablet (324 mg total) by mouth in the morning   folic acid (FOLVITE) 1 mg tablet  Self Yes No   Sig: Take 2,000 mcg by mouth daily   hydroxychloroquine (PLAQUENIL) 200 mg tablet  Self Yes No   Si pill am 1/2 pill pm   levothyroxine 75 mcg tablet   No No   Sig: Take 0 5 tablets (37 5 mcg total) by mouth daily in the early morning   meloxicam (Mobic) 7 5 mg tablet  Self No No   Sig: Take 1 tablet (7 5 mg total) by mouth daily methocarbamol (ROBAXIN) 750 mg tablet   No No   Sig: Take 1 tablet (750 mg total) by mouth 2 (two) times a day as needed for muscle spasms (may cause drowsiness)   methotrexate 2 5 MG tablet  Self No No   Sig: 3 tablets by mouth once a week on Thursday   nortriptyline (PAMELOR) 50 mg capsule   No No   Sig: Take 2 capsules (100 mg total) by mouth daily at bedtime   predniSONE 5 mg tablet  Self Yes No   Sig: Take 5 mg by mouth daily   rOPINIRole (REQUIP) 2 mg tablet   No No   Sig: Take 1 tablet (2 mg total) by mouth daily at bedtime   senna-docusate sodium (SENOKOT S) 8 6-50 mg per tablet   No No   Sig: Take 1 tablet by mouth 2 (two) times a day      Facility-Administered Medications: None       Past Medical History:   Diagnosis Date   • Acute blood loss anemia 9/9/2020   • Aftercare following joint replacement 9/9/2020    Patient had right reversed total shoulder arthroplasty   Pain was controlled when he left the hospital      • Anxiety    • Arthritis    • Depression    • Disease of thyroid gland     HYPO   • Femur neck fracture (HCC)    • GERD (gastroesophageal reflux disease)    • Hyperthyroidism     RESOLVED: [id number]   • Osteoporosis    • Polio    • PVD (peripheral vascular disease) ([facility] [address] )    • Right BBB/left ant fasc block    • UTI (urinary tract infection)    • Varicella infection     LAST ASSESSED: [id number]       Past Surgical History:   Procedure Laterality Date   • APPENDECTOMY     • HIP ARTHROPLASTY Left 11/27/2017    Procedure: REMOVAL IM NAIL CONVERSION TO TOTAL HIP ARTHROPLASTY POSTERIOR APPROACH;  Surgeon: Cammie Fregoso MD;  [facility];  Service: Orthopedics   • HIP FRACTURE SURGERY     • HIP SURGERY      both hips replaced;2013 left ,2014 right   • JOINT REPLACEMENT Right     HIP TOTAL   • [place] CONV PREV HIP SURG TO TOT HIP ARTHROPLAS Left 10/4/2017    Procedure: Lucia Mitchell removal of left hip;  Surgeon: Cammie Fregoso MD;  [facility];  Service: Orthopedics   • DE RECONSTR TOTAL SHOULDER IMPLANT Right 9/2/2020    Procedure: ARTHROPLASTY SHOULDER REVERSE;  Surgeon: Laureen Schulz MD;  [facility];  Service: Orthopedics   • [place] RECONSTR TOTAL SHOULDER IMPLANT Left 6/1/2021    Procedure: ARTHROPLASTY SHOULDER REVERSE;  Surgeon: Laureen Schulz MD;  [facility];  Service: Orthopedics   • [place] WRIST Mitchell Snide LIG Right 5/24/2022    Procedure: RELEASE CARPAL TUNNEL ENDOSCOPIC-right;  Surgeon: Tima So MD;  [facility];  Service: Orthopedics   • REVISION TOTAL HIP ARTHROPLASTY Right    • TOE AMPUTATION Left 7/24/2022    Procedure: 5TH METATARSAL RESECTION WITH BOTTOM FLAP CLOSURE;  Surgeon: James Nichole DPM;  [facility];  Service: Podiatry   • TONSILLECTOMY         Family History   Problem Relation Age of Onset   • Rheum arthritis Mother    • Rheum arthritis Sister    • Prostate cancer Brother      I have reviewed and agree with the history as documented  E-Cigarette/Vaping   • E-Cigarette Use Never User      E-Cigarette/Vaping Substances   • Nicotine No    • THC No    • CBD No    • Flavoring No    • Other No    • Unknown No      Social History     Tobacco Use   • Smoking status: Former Smoker   • Smokeless tobacco: Never Used   • Tobacco comment: quit 20-30 years ago   Vaping Use   • Vaping Use: Never used   Substance Use Topics   • Alcohol use: Not Currently   • Drug use: Not Currently        Review of Systems   Constitutional: Negative for chills and fever  HENT: Negative for ear pain and sore throat  Eyes: Negative for pain and visual disturbance  Respiratory: Negative for cough and shortness of breath  Cardiovascular: Negative for chest pain and palpitations  Gastrointestinal: Negative for abdominal pain and vomiting  Genitourinary: Negative for dysuria and hematuria  Musculoskeletal: Positive for arthralgias  Negative for back pain  Skin: Negative for color change and rash     Neurological: Negative for seizures and syncope  All other systems reviewed and are negative  Physical Exam  ED Triage Vitals   Temperature Pulse Respirations Blood Pressure SpO2   10/22/22 2005 10/22/22 2005 10/22/22 2005 10/22/22 2005 10/22/22 2005   97 6 °F (36 4 °C) 78 18 140/69 99 %      Temp Source Heart Rate Source Patient Position - Orthostatic VS BP Location FiO2 (%)   10/22/22 2005 10/22/22 2005 10/22/22 2248 10/22/22 2248 --   Oral Monitor Sitting Right arm       Pain Score       10/22/22 2005       6             Orthostatic Vital Signs  Vitals:    10/22/22 2005 10/22/22 2248   BP: 140/69 122/59   Pulse: 78 77   Patient Position - Orthostatic VS:  Sitting       Physical Exam  Vitals and nursing note reviewed  Constitutional:       General: She is not in acute distress  Appearance: Normal appearance  She is normal weight  She is not ill-appearing, toxic-appearing or diaphoretic  HENT:      Head: Normocephalic and atraumatic  Right Ear: Tympanic membrane, ear canal and external ear normal       Left Ear: Tympanic membrane, ear canal and external ear normal       Nose: Nose normal       Mouth/Throat:      Mouth: Mucous membranes are moist       Pharynx: Oropharynx is clear  Eyes:      General:         Right eye: No discharge  Left eye: No discharge  Extraocular Movements: Extraocular movements intact  Conjunctiva/sclera: Conjunctivae normal       Pupils: Pupils are equal, round, and reactive to light  Cardiovascular:      Rate and Rhythm: Normal rate and regular rhythm  Pulses: Normal pulses  Heart sounds: Normal heart sounds  No murmur heard  No friction rub  Pulmonary:      Effort: Pulmonary effort is normal  No respiratory distress  Breath sounds: Normal breath sounds  No stridor  No wheezing, rhonchi or rales  Chest:      Chest wall: Tenderness present  Abdominal:      General: Abdomen is flat  Bowel sounds are normal  There is no distension        Palpations: Abdomen is soft  Tenderness: There is no abdominal tenderness  There is no guarding  Musculoskeletal:         General: Tenderness present  No swelling or deformity  Normal range of motion  Cervical back: Normal range of motion and neck supple  No rigidity or tenderness  Comments: Left shoulder tenderness   Skin:     General: Skin is warm and dry  Capillary Refill: Capillary refill takes less than 2 seconds  Coloration: Skin is not pale  Neurological:      Mental Status: She is alert and oriented to person, place, and time  Cranial Nerves: No cranial nerve deficit  Sensory: No sensory deficit  Motor: No weakness  Coordination: Coordination normal    Psychiatric:         Mood and Affect: Mood normal          Behavior: Behavior normal          ED Medications  Medications   methocarbamol (ROBAXIN) tablet 500 mg (has no administration in time range)   oxyCODONE (ROXICODONE) IR tablet 5 mg (5 mg Oral Given 10/22/22 2047)       Diagnostic Studies  Results Reviewed     None                 CT head without contrast   Final Result by Cassius Royal MD (10/22 2226)      No intracranial hemorrhage or calvarial fracture  Workstation performed: [id number]         CT spine cervical without contrast   Final Result by Cassius Royal MD (10/22 2237)      No cervical spine fracture or traumatic malalignment  Workstation performed: [id number]         CT chest wo contrast   Final Result by Cassius Royal MD (10/22 2312)      No acute traumatic injury within the thorax  Multiple bilateral old/healed fracture deformities  Additional fractures at the anterior and slightly lateral left 4th, 5th and 6th ribs with minimal displacement appear? Subacute  Correlate clinically  Postsurgical changes of prior bilateral total shoulder arthroplasty with progressed anteromedial glenohumeral dislocation on the left compared to prior radiograph        Study was marked in Epic for significant notification  Workstation performed: [id number]         XR shoulder 2+ views LEFT    (Results Pending)   XR humerus LEFT    (Results Pending)         Procedures  Procedures      ED Course               Identification of Seniors at [address] [place] [address] Most Recent Value   (ISAR) Identification of Seniors at Risk    Before the illness or injury that brought you to the Emergency, did you need someone to help you on a regular basis? 0 Filed at: 10/22/2022 2006   In the last 24 hours, have you needed more help than usual? 0 Filed at: 10/22/2022 2006   Have you been hospitalized for one or more nights during the past 6 months? 1 Filed at: 10/22/2022 2006   In general, do you see well? 0 Filed at: 10/22/2022 2006   In general, do you have serious problems with your memory? 0 Filed at: 10/22/2022 2006   Do you take more than three different medications every day? 1 Filed at: 10/22/2022 2006   ISAR Score 2 Filed at: 10/22/2022 2006                              MDM  Number of Diagnoses or Management Options  Chronic left shoulder pain: established and worsening  Fall from standing: new and requires workup  Left-sided chest wall pain: new and requires workup  Diagnosis management comments: Impression:  67-year-old female presents with acute on chronic left shoulder pain after mechanical fall at home with head strike  No anticoagulation or antiplatelet therapy  No loss of consciousness  Vital signs normal   Left shoulder and left chest wall tenderness on exam otherwise unremarkable  Plan:  Analgesia, CT head, CT C-spine, left shoulder x-ray, left humerus x-ray    Patient reports significant improvement in pain    Imaging negative for any acute pathology       Amount and/or Complexity of Data Reviewed  Tests in the radiology section of CPT®: ordered and reviewed  Review and summarize past medical records: yes  Independent visualization of images, tracings, or specimens: yes    Risk of Complications, Morbidity, and/or Mortality  Presenting problems: moderate  Diagnostic procedures: low  Management options: low    Patient Progress  Patient progress: improved      Disposition  Final diagnoses:   Fall from standing   Chronic left shoulder pain   Left-sided chest wall pain     Time reflects when diagnosis was documented in both MDM as applicable and the Disposition within this note     Time User Action Codes Description Comment    10/22/2022 11:19 PM Clydene Revering Add [J62  XXXA] Fall from standing     10/22/2022 11:19 PM Clydene Revering Add [Q71 974,  G89 29] Chronic left shoulder pain     10/22/2022 11:19 PM Clydene Revering Add [M57 97] Left-sided chest wall pain       ED Disposition     ED Disposition   Discharge    Condition   Stable    Date/Time   Sat Oct 22, 2022 11:19 PM    Comment   Geetha Rivera discharge to home/self care  Follow-up Information     Follow up With Specialties Details Why [address], [place] Family Medicine Call  As needed [phone] [address]  [phone]            Patient's Medications   Discharge Prescriptions    No medications on file     No discharge procedures on file  PDMP Review       Value Time User    PDMP Reviewed  Yes 7/22/2022 12:00 AM Dayanara Phillip           ED Provider  Attending physically available and evaluated Geetha Rivera I managed the patient along with the ED Attending      Electronically Signed by         Philip Justin MD  10/22/22 5296

## 2022-10-23 NOTE — ED ATTENDING ATTESTATION
10/22/2022  I, Gisselle Reeves MD, saw and evaluated the patient  I have discussed the patient with the resident/non-physician practitioner and agree with the resident's/non-physician practitioner's findings, Plan of Care, and MDM as documented in the resident's/non-physician practitioner's note, except where noted  All available labs and Radiology studies were reviewed  I was present for key portions of any procedure(s) performed by the resident/non-physician practitioner and I was immediately available to provide assistance  At this point I agree with the current assessment done in the Emergency Department  I have conducted an independent evaluation of this patient a history and physical is as follows:  Pt was walking and tripped and fell backward Pt hit head no loc Pt co L shoulder pain Pt has chronic L shoulder pain from chronic dislocated shoulder more pain now   No neck head or back pain No syncope prior to event PE: alert neck and spine nontender heart reg lungs clear abd soft nontender tender L shoudler and upper L ribs MDM: will do ct head chest and xray l shoulder  ED Course         Critical Care Time  Procedures

## 2022-10-24 DIAGNOSIS — M62.838 MUSCLE SPASM: ICD-10-CM

## 2022-10-24 DIAGNOSIS — G25.81 RESTLESS LEGS SYNDROME: ICD-10-CM

## 2022-10-24 RX ORDER — METHOCARBAMOL 750 MG/1
TABLET, FILM COATED ORAL
Qty: 30 TABLET | Refills: 0 | Status: SHIPPED | OUTPATIENT
Start: 2022-10-24

## 2022-10-25 RX ORDER — ROPINIROLE 2 MG/1
2 TABLET, FILM COATED ORAL
Qty: 90 TABLET | Refills: 0 | OUTPATIENT
Start: 2022-10-25

## 2022-10-25 RX ORDER — METHOCARBAMOL 750 MG/1
750 TABLET, FILM COATED ORAL 2 TIMES DAILY PRN
Qty: 30 TABLET | Refills: 0 | OUTPATIENT
Start: 2022-10-25

## 2022-11-02 DIAGNOSIS — G25.81 RESTLESS LEGS SYNDROME: ICD-10-CM

## 2022-11-02 RX ORDER — ROPINIROLE 2 MG/1
2 TABLET, FILM COATED ORAL
Qty: 90 TABLET | Refills: 0 | Status: SHIPPED | OUTPATIENT
Start: 2022-11-02

## 2022-11-15 ENCOUNTER — OFFICE VISIT (OUTPATIENT)
Dept: PODIATRY | Facility: CLINIC | Age: 77
End: 2022-11-15

## 2022-11-15 VITALS — BODY MASS INDEX: 18.89 KG/M2 | HEIGHT: 58 IN | WEIGHT: 90 LBS

## 2022-11-15 DIAGNOSIS — M20.42 HAMMER TOES OF BOTH FEET: ICD-10-CM

## 2022-11-15 DIAGNOSIS — Q66.70 PES CAVUS: Primary | ICD-10-CM

## 2022-11-15 DIAGNOSIS — M20.41 HAMMER TOES OF BOTH FEET: ICD-10-CM

## 2022-11-15 NOTE — PROGRESS NOTES
Patient ID: Terra Baeza is a 65 y o  female Date of Birth 1945       Assessment:    No problem-specific Assessment & Plan notes found for this encounter  Diagnoses and all orders for this visit:    Pes cavus    Hammer toes of both feet      Plan:  1  Reviewed medical records and recent labs  2  Patient was counseled and educated on the condition and the diagnosis  3  No recurring ulcer on left foot  HPK trimmed  4  Instructed skin care and protection  Instructed protective foot wear  5  She has high risk for recurring ulcer and recommended periodic foot exam   6  RA in 12 weeks  Subjective:      LUISA Cali presents for left foot evaluation  History of ulcer on left foot with met head resection / flap closure  No bleeding or drainage  Slight tenderness when she walks  No redness  No acute swelling  The following portions of the patient's history were reviewed and updated as appropriate: allergies, current medications, past family history, past medical history, past social history, past surgical history and problem list       PAST MEDICAL HISTORY:  Past Medical History:   Diagnosis Date   • Acute blood loss anemia 9/9/2020   • Aftercare following joint replacement 9/9/2020    Patient had right reversed total shoulder arthroplasty   Pain was controlled when he left the hospital      • Anxiety    • Arthritis    • Depression    • Disease of thyroid gland     HYPO   • Femur neck fracture ([facility])    • GERD (gastroesophageal reflux disease)    • Hyperthyroidism     RESOLVED: [id number]   • Osteoporosis    • Polio    • PVD (peripheral vascular disease) ([facility])    • Right BBB/left ant fasc block    • UTI (urinary tract infection)    • Varicella infection     LAST ASSESSED: [id number]       PAST SURGICAL HISTORY:  Past Surgical History:   Procedure Laterality Date   • APPENDECTOMY     • HIP ARTHROPLASTY Left 11/27/2017    Procedure: REMOVAL IM NAIL CONVERSION TO TOTAL HIP ARTHROPLASTY POSTERIOR APPROACH;  Surgeon: Sandra Banuelos MD;  [facility];  Service: Orthopedics   • HIP FRACTURE SURGERY     • HIP SURGERY      both hips replaced;2013 left ,2014 right   • JOINT REPLACEMENT Right     HIP TOTAL   • [place] CONV PREV HIP SURG TO TOT HIP ARTHROPLAS Left 10/4/2017    Procedure: Kehinde Cespedes removal of left hip;  Surgeon: Sandra Banuelos MD;  [facility];  Service: Orthopedics   • [place] RECONSTR TOTAL SHOULDER IMPLANT Right 9/2/2020    Procedure: ARTHROPLASTY SHOULDER REVERSE;  Surgeon: Harlene Kanner, MD;  [facility];  Service: Orthopedics   • [place] RECONSTR TOTAL SHOULDER IMPLANT Left 6/1/2021    Procedure: ARTHROPLASTY SHOULDER REVERSE;  Surgeon: Harlene Kanner, MD;  [facility];  Service: Orthopedics   • [place] WRIST Bernardo Greening LIG Right 5/24/2022    Procedure: RELEASE CARPAL TUNNEL ENDOSCOPIC-right;  Surgeon: Antony Meyer MD;  [facility];  Service: Orthopedics   • REVISION TOTAL HIP ARTHROPLASTY Right    • TOE AMPUTATION Left 7/24/2022    Procedure: 5TH METATARSAL RESECTION WITH BOTTOM FLAP CLOSURE;  Surgeon: Aydee Galvan DPM;  [facility];  Service: Podiatry   • TONSILLECTOMY          ALLERGIES:  Patient has no known allergies      MEDICATIONS:  Current Outpatient Medications   Medication Sig Dispense Refill   • acetaminophen (TYLENOL) 650 mg CR tablet Take 1 tablet (650 mg total) by mouth every 8 (eight) hours as needed for mild pain 30 tablet 0   • Ascorbic Acid, Vitamin C, (VITAMIN C) 100 MG tablet Take 400 mg by mouth daily     • folic acid (FOLVITE) 1 mg tablet Take 2,000 mcg by mouth daily     • hydroxychloroquine (PLAQUENIL) 200 mg tablet 1 pill am 1/2 pill pm     • MELATONIN PO Take by mouth     • meloxicam (Mobic) 7 5 mg tablet Take 1 tablet (7 5 mg total) by mouth daily 30 tablet 0   • methocarbamol (ROBAXIN) 750 mg tablet Take 1 tablet (750 mg total) by mouth 2 (two) times a day as needed for muscle spasms 30 tablet 0   • methocarbamol (ROBAXIN) 750 mg tablet Take 1 tablet (750 mg total) by mouth 2 (two) times a day as needed for muscle spasms (may cause drowsiness) 30 tablet 0   • methotrexate 2 5 MG tablet 3 tablets by mouth once a week on Thursday     • Multiple Vitamins-Minerals (CENTRUM SILVER PO) Take 1 tablet by mouth daily     • nortriptyline (PAMELOR) 50 mg capsule Take 2 capsules (100 mg total) by mouth daily at bedtime 90 capsule 0   • OXYCODONE HCL PO Take by mouth     • predniSONE 5 mg tablet Take 5 mg by mouth daily     • rOPINIRole (REQUIP) 2 mg tablet Take 1 tablet (2 mg total) by mouth daily at bedtime 90 tablet 0   • senna-docusate sodium (SENOKOT S) 8 6-50 mg per tablet Take 1 tablet by mouth 2 (two) times a day 20 tablet 0   • ferrous gluconate (FERGON) 324 mg tablet Take 1 tablet (324 mg total) by mouth in the morning 90 tablet 0   • levothyroxine 75 mcg tablet Take 0 5 tablets (37 5 mcg total) by mouth daily in the early morning 60 tablet 0     No current facility-administered medications for this visit         SOCIAL HISTORY:  Social History     Socioeconomic History   • Marital status:      Spouse name: None   • Number of children: 1   • Years of education: None   • Highest education level: None   Occupational History   • Occupation: RETIRED    Tobacco Use   • Smoking status: Former Smoker   • Smokeless tobacco: Never Used   • Tobacco comment: quit 20-30 years ago   Vaping Use   • Vaping Use: Never used   Substance and Sexual Activity   • Alcohol use: Not Currently   • Drug use: Not Currently   • Sexual activity: Not Currently   Other Topics Concern   • None   Social History Narrative    Always uses seat belt    Caffeine use    Judaism    Single as per all scripts      Social Determinants of Health     Financial Resource Strain: Not on file   Food Insecurity: No Food Insecurity   • Worried About Running Out of Food in the Last Year: Never true   • Ran Out of Food in the Last Year: Never true Transportation Needs: No Transportation Needs   • Lack of Transportation (Medical): No   • Lack of Transportation (Non-Medical): No   Physical Activity: Not on file   Stress: Not on file   Social Connections: Not on file   Intimate Partner Violence: Not on file   Housing Stability: Low Risk    • Unable to Pay for Housing in the Last Year: No   • Number of Places Lived in the Last Year: 1   • Unstable Housing in the Last Year: No      Review of Systems   Constitutional: Negative for appetite change, chills and fever  Respiratory: Negative for cough and shortness of breath  Cardiovascular: Positive for leg swelling  Negative for chest pain  Gastrointestinal: Negative for diarrhea, nausea and vomiting  Objective:            Ht 4' 10" (1 473 m) Comment: verbal  Wt 40 8 kg (90 lb)   BMI 18 81 kg/m²     Physical Exam  Vitals reviewed  Constitutional:       General: She is not in acute distress  Appearance: She is not toxic-appearing or diaphoretic  Cardiovascular:      Rate and Rhythm: Normal rate and regular rhythm  Pulses:           Dorsalis pedis pulses are 0 on the right side and 0 on the left side  Posterior tibial pulses are 1+ on the right side and 0 on the left side  Comments: No gangrene  Pulmonary:      Effort: Pulmonary effort is normal  No respiratory distress  Musculoskeletal:         General: Deformity present  No tenderness or signs of injury  Right lower leg: Edema present  Left lower leg: Edema present  Right foot: No foot drop  Left foot: No foot drop  Comments: Pes cavus and hammertoe noted  Feet:      Right foot:      Skin integrity: Dry skin present  Left foot:      Skin integrity: Dry skin present  Skin:     General: Skin is warm  Capillary Refill: Capillary refill takes less than 2 seconds  Coloration: Skin is not cyanotic  Findings: Wound present  No abscess  Nails: There is no clubbing  Comments: No wound left submet 5  Mild keratosis presents  Neurological:      General: No focal deficit present  Mental Status: She is alert and oriented to person, place, and time  Cranial Nerves: No cranial nerve deficit  Sensory: No sensory deficit  Psychiatric:         Mood and Affect: Mood normal          Behavior: Behavior normal          Thought Content:  Thought content normal          Judgment: Judgment normal

## 2022-11-18 DIAGNOSIS — G47.09 OTHER INSOMNIA: ICD-10-CM

## 2022-11-18 DIAGNOSIS — G25.81 RLS (RESTLESS LEGS SYNDROME): Primary | ICD-10-CM

## 2022-11-18 RX ORDER — NORTRIPTYLINE HYDROCHLORIDE 50 MG/1
100 CAPSULE ORAL
Qty: 90 CAPSULE | Refills: 0 | Status: SHIPPED | OUTPATIENT
Start: 2022-11-18 | End: 2022-11-21 | Stop reason: SDUPTHER

## 2022-11-18 RX ORDER — METHOCARBAMOL 750 MG/1
TABLET, FILM COATED ORAL
Qty: 30 TABLET | Refills: 0 | Status: SHIPPED | OUTPATIENT
Start: 2022-11-18 | End: 2022-11-21 | Stop reason: SDUPTHER

## 2022-11-21 ENCOUNTER — OFFICE VISIT (OUTPATIENT)
Dept: FAMILY MEDICINE CLINIC | Facility: CLINIC | Age: 77
End: 2022-11-21

## 2022-11-21 VITALS
TEMPERATURE: 97 F | WEIGHT: 96 LBS | HEIGHT: 58 IN | RESPIRATION RATE: 17 BRPM | OXYGEN SATURATION: 98 % | HEART RATE: 86 BPM | DIASTOLIC BLOOD PRESSURE: 82 MMHG | SYSTOLIC BLOOD PRESSURE: 130 MMHG | BODY MASS INDEX: 20.15 KG/M2

## 2022-11-21 DIAGNOSIS — D64.9 ANEMIA, UNSPECIFIED TYPE: ICD-10-CM

## 2022-11-21 DIAGNOSIS — Z23 FLU VACCINE NEED: Primary | ICD-10-CM

## 2022-11-21 DIAGNOSIS — G47.09 OTHER INSOMNIA: ICD-10-CM

## 2022-11-21 DIAGNOSIS — M05.79 RHEUMATOID ARTHRITIS INVOLVING MULTIPLE SITES WITH POSITIVE RHEUMATOID FACTOR (HCC): ICD-10-CM

## 2022-11-21 DIAGNOSIS — M81.0 AGE-RELATED OSTEOPOROSIS WITHOUT CURRENT PATHOLOGICAL FRACTURE: ICD-10-CM

## 2022-11-21 DIAGNOSIS — L89.894 PRESSURE INJURY OF LEFT FOOT, STAGE 4 (HCC): ICD-10-CM

## 2022-11-21 DIAGNOSIS — M32.19 OTHER SYSTEMIC LUPUS ERYTHEMATOSUS WITH OTHER ORGAN INVOLVEMENT (HCC): ICD-10-CM

## 2022-11-21 DIAGNOSIS — G25.81 RLS (RESTLESS LEGS SYNDROME): ICD-10-CM

## 2022-11-21 PROBLEM — M86.9 PYOGENIC INFLAMMATION OF BONE (HCC): Status: RESOLVED | Noted: 2022-07-21 | Resolved: 2022-11-21

## 2022-11-21 RX ORDER — METHOCARBAMOL 750 MG/1
750 TABLET, FILM COATED ORAL 2 TIMES DAILY PRN
Qty: 30 TABLET | Refills: 5 | Status: SHIPPED | OUTPATIENT
Start: 2022-11-21

## 2022-11-21 RX ORDER — NORTRIPTYLINE HYDROCHLORIDE 50 MG/1
100 CAPSULE ORAL
Qty: 180 CAPSULE | Refills: 1 | Status: SHIPPED | OUTPATIENT
Start: 2022-11-21 | End: 2023-02-19

## 2022-11-21 NOTE — ASSESSMENT & PLAN NOTE
Pt does not want to see rheum but educated that in order to receive high risk meds like methotrexate she will need to continue with rheum

## 2022-11-21 NOTE — ASSESSMENT & PLAN NOTE
Due for prolia  Check vit d  Will do a prior auth and have pt return for a nurse visit  Also check vit d   Ca WNL in August

## 2022-11-21 NOTE — PROGRESS NOTES
Assessment/Plan:    Age-related osteoporosis without current pathological fracture  Due for prolia  Check vit d  Will do a prior auth and have pt return for a nurse visit  Also check vit d  Ca WNL in August      Rheumatoid arthritis involving multiple sites with positive rheumatoid factor ([facility] )  Pt does not want to see rheum but educated that in order to receive high risk meds like methotrexate she will need to continue with rheum  RLS (restless legs syndrome)  Refills given for ropinirole    Other forms of systemic lupus erythematosus ([facility] )  Will schedule appt with rheum  Pressure injury of left foot, stage 4 ([facility] )  Continue mgmt through podiatry  Anemia  She has had anemia since 2017  Last time iron was checked was 2020 and iron was low at 33  She is on iron supplements  She has never seen hematology  A cbc in August showed a hg of 11 3 and a normal MCV  Continue to monitor  Diagnoses and all orders for this visit:    Flu vaccine need  -     influenza vaccine, high-dose, PF 0 7 mL (FLUZONE HIGH-DOSE)    Age-related osteoporosis without current pathological fracture  -     Vitamin D 25 hydroxy; Future    Rheumatoid arthritis involving multiple sites with positive rheumatoid factor (HCC)    RLS (restless legs syndrome)  -     methocarbamol (ROBAXIN) 750 mg tablet; Take 1 tablet (750 mg total) by mouth 2 (two) times a day as needed for muscle spasms    Other insomnia  -     nortriptyline (PAMELOR) 50 mg capsule;  Take 2 capsules (100 mg total) by mouth daily at bedtime    Other systemic lupus erythematosus with other organ involvement (HCC)    Pressure injury of left foot, stage 4 (HCC)    Anemia, unspecified type        Subjective: chronic conditions      Patient ID: Richardson Mehta is a 65 y o  female  with a ho SLE, rheumatoid arthritis, hypothyroidism, osteoporosis, chronic pain     HPI  SLE and rheumatoid arthritis were managed by rheum, Dr Isaiah Almonte  She is on methotrexate weekly and prednisone 5mg daily and plaquenil bid  She does not want to return to rheum  Osteoporosis controlled with prolia  Last vit d was normal 11/2021     Hypothyroidism: On levothyroxine 25mcg  Last TSH was 7 820 8/2/22 with normal T4       Anemia: She has had anemia since 2017  Last time iron was checked was 2020 and iron was low at 33  She is on iron supplements  She has never seen hematology  A cbc in August showed a hg of 11 3 and a normal MCV  Fasting sugars 83  The following portions of the patient's history were reviewed and updated as appropriate: allergies, current medications, past family history, past medical history, past social history, past surgical history and problem list     Review of Systems   Constitutional: Negative for fever and unexpected weight change  HENT: Negative for ear pain, sore throat and trouble swallowing  Eyes: Negative for pain and visual disturbance  Respiratory: Negative for cough, chest tightness, shortness of breath and wheezing  Cardiovascular: Negative for chest pain  Gastrointestinal: Negative for abdominal distention, abdominal pain, blood in stool, constipation, diarrhea, nausea and vomiting  Endocrine: Negative for polydipsia and polyuria  Genitourinary: Negative for dysuria and hematuria  Musculoskeletal: Positive for arthralgias and back pain  Negative for myalgias  Skin: Negative for rash  Neurological: Negative for syncope and headaches  Psychiatric/Behavioral: Negative for suicidal ideas  PHQ-2/9 Depression Screening         [unfilled]    Objective:      /82 (BP Location: Left arm, Patient Position: Sitting, Cuff Size: Adult)   Pulse 86   Temp (!) 97 °F (36 1 °C) (Tympanic)   Resp 17   Ht 4' 9 5" (1 461 m)   Wt 43 5 kg (96 lb)   SpO2 98%   BMI 20 41 kg/m²          Physical Exam  Constitutional:       Appearance: She is well-developed and well-nourished  Comments:  In wheelchair   HENT:      Head: Normocephalic and atraumatic  Right Ear: External ear normal       Left Ear: External ear normal       Mouth/Throat:      Pharynx: No oropharyngeal exudate  Eyes:      General: No scleral icterus  Extraocular Movements: EOM normal       Conjunctiva/sclera: Conjunctivae normal       Pupils: Pupils are equal, round, and reactive to light  Cardiovascular:      Rate and Rhythm: Normal rate and regular rhythm  Pulses: Intact distal pulses  Heart sounds: No murmur heard  No friction rub  No gallop  Pulmonary:      Effort: Pulmonary effort is normal  No respiratory distress  Breath sounds: Normal breath sounds  No wheezing or rales  Abdominal:      General: Bowel sounds are normal  There is no distension  Palpations: Abdomen is soft  There is no mass  Tenderness: There is no abdominal tenderness  There is no rebound  Musculoskeletal:         General: Normal range of motion  Cervical back: Normal range of motion and neck supple  Right lower leg: Edema (1+pitting ankle with milk chronic erythema, no warmth) present  Comments: kyphosis   Skin:     General: Skin is warm and dry  Neurological:      Mental Status: She is alert and oriented to person, place, and time     Psychiatric:         Mood and Affect: Mood and affect normal

## 2022-11-21 NOTE — ASSESSMENT & PLAN NOTE
She has had anemia since 2017  Last time iron was checked was 2020 and iron was low at 33  She is on iron supplements  She has never seen hematology  A cbc in August showed a hg of 11 3 and a normal MCV  Continue to monitor

## 2022-11-30 ENCOUNTER — APPOINTMENT (OUTPATIENT)
Dept: LAB | Facility: CLINIC | Age: 77
End: 2022-11-30

## 2022-11-30 DIAGNOSIS — D64.9 ANEMIA, UNSPECIFIED TYPE: ICD-10-CM

## 2022-11-30 DIAGNOSIS — E06.3 HYPOTHYROIDISM DUE TO HASHIMOTO'S THYROIDITIS: ICD-10-CM

## 2022-11-30 DIAGNOSIS — Z13.1 SCREENING FOR DIABETES MELLITUS (DM): ICD-10-CM

## 2022-11-30 DIAGNOSIS — M81.0 AGE-RELATED OSTEOPOROSIS WITHOUT CURRENT PATHOLOGICAL FRACTURE: ICD-10-CM

## 2022-11-30 DIAGNOSIS — E87.1 HYPONATREMIA: ICD-10-CM

## 2022-11-30 DIAGNOSIS — E03.8 HYPOTHYROIDISM DUE TO HASHIMOTO'S THYROIDITIS: ICD-10-CM

## 2022-11-30 LAB
25(OH)D3 SERPL-MCNC: 24.9 NG/ML (ref 30–100)
ANION GAP SERPL CALCULATED.3IONS-SCNC: 7 MMOL/L (ref 4–13)
BASOPHILS # BLD AUTO: 0.03 THOUSANDS/ÂΜL (ref 0–0.1)
BASOPHILS NFR BLD AUTO: 1 % (ref 0–1)
BUN SERPL-MCNC: 15 MG/DL (ref 5–25)
CALCIUM SERPL-MCNC: 9.5 MG/DL (ref 8.3–10.1)
CHLORIDE SERPL-SCNC: 105 MMOL/L (ref 96–108)
CHOLEST SERPL-MCNC: 192 MG/DL
CO2 SERPL-SCNC: 27 MMOL/L (ref 21–32)
CREAT SERPL-MCNC: 0.58 MG/DL (ref 0.6–1.3)
EOSINOPHIL # BLD AUTO: 0.05 THOUSAND/ÂΜL (ref 0–0.61)
EOSINOPHIL NFR BLD AUTO: 1 % (ref 0–6)
ERYTHROCYTE [DISTWIDTH] IN BLOOD BY AUTOMATED COUNT: 16.7 % (ref 11.6–15.1)
EST. AVERAGE GLUCOSE BLD GHB EST-MCNC: 97 MG/DL
FERRITIN SERPL-MCNC: 56 NG/ML (ref 8–388)
GFR SERPL CREATININE-BSD FRML MDRD: 88 ML/MIN/1.73SQ M
GLUCOSE P FAST SERPL-MCNC: 92 MG/DL (ref 65–99)
HBA1C MFR BLD: 5 %
HCT VFR BLD AUTO: 37.7 % (ref 34.8–46.1)
HDLC SERPL-MCNC: 92 MG/DL
HGB BLD-MCNC: 11.3 G/DL (ref 11.5–15.4)
IMM GRANULOCYTES # BLD AUTO: 0.03 THOUSAND/UL (ref 0–0.2)
IMM GRANULOCYTES NFR BLD AUTO: 1 % (ref 0–2)
IRON SATN MFR SERPL: 23 % (ref 15–50)
IRON SERPL-MCNC: 71 UG/DL (ref 50–170)
LDLC SERPL CALC-MCNC: 90 MG/DL (ref 0–100)
LYMPHOCYTES # BLD AUTO: 1.06 THOUSANDS/ÂΜL (ref 0.6–4.47)
LYMPHOCYTES NFR BLD AUTO: 18 % (ref 14–44)
MCH RBC QN AUTO: 26.6 PG (ref 26.8–34.3)
MCHC RBC AUTO-ENTMCNC: 30 G/DL (ref 31.4–37.4)
MCV RBC AUTO: 89 FL (ref 82–98)
MONOCYTES # BLD AUTO: 0.59 THOUSAND/ÂΜL (ref 0.17–1.22)
MONOCYTES NFR BLD AUTO: 10 % (ref 4–12)
NEUTROPHILS # BLD AUTO: 4.25 THOUSANDS/ÂΜL (ref 1.85–7.62)
NEUTS SEG NFR BLD AUTO: 69 % (ref 43–75)
NONHDLC SERPL-MCNC: 100 MG/DL
NRBC BLD AUTO-RTO: 0 /100 WBCS
PLATELET # BLD AUTO: 303 THOUSANDS/UL (ref 149–390)
PMV BLD AUTO: 9 FL (ref 8.9–12.7)
POTASSIUM SERPL-SCNC: 3.7 MMOL/L (ref 3.5–5.3)
RBC # BLD AUTO: 4.25 MILLION/UL (ref 3.81–5.12)
SODIUM SERPL-SCNC: 139 MMOL/L (ref 135–147)
T4 FREE SERPL-MCNC: 0.5 NG/DL (ref 0.76–1.46)
TIBC SERPL-MCNC: 310 UG/DL (ref 250–450)
TRIGL SERPL-MCNC: 48 MG/DL
TSH SERPL DL<=0.05 MIU/L-ACNC: 16.4 UIU/ML (ref 0.45–4.5)
WBC # BLD AUTO: 6.01 THOUSAND/UL (ref 4.31–10.16)

## 2022-12-07 DIAGNOSIS — G89.4 CHRONIC PAIN SYNDROME: ICD-10-CM

## 2022-12-07 DIAGNOSIS — E06.3 HYPOTHYROIDISM DUE TO HASHIMOTO'S THYROIDITIS: ICD-10-CM

## 2022-12-07 DIAGNOSIS — E03.8 HYPOTHYROIDISM DUE TO HASHIMOTO'S THYROIDITIS: ICD-10-CM

## 2022-12-07 RX ORDER — LEVOTHYROXINE SODIUM 0.05 MG/1
50 TABLET ORAL
Qty: 90 TABLET | Refills: 1 | Status: SHIPPED | OUTPATIENT
Start: 2022-12-07 | End: 2023-01-06

## 2022-12-13 ENCOUNTER — APPOINTMENT (EMERGENCY)
Dept: RADIOLOGY | Facility: HOSPITAL | Age: 77
End: 2022-12-13

## 2022-12-13 ENCOUNTER — HOSPITAL ENCOUNTER (EMERGENCY)
Facility: HOSPITAL | Age: 77
Discharge: HOME | End: 2022-12-14
Attending: EMERGENCY MEDICINE

## 2022-12-13 DIAGNOSIS — S09.90XA INJURY OF HEAD, INITIAL ENCOUNTER: ICD-10-CM
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
## 2022-12-13 RX ORDER — ACETAMINOPHEN 325 MG/1
975 TABLET ORAL ONCE
Status: COMPLETED | OUTPATIENT
Start: 2022-12-13 | End: 2022-12-13

## 2022-12-13 RX ORDER — ROPINIROLE 2 MG/1
2 TABLET, FILM COATED ORAL ONCE
Status: COMPLETED | OUTPATIENT
Start: 2022-12-13 | End: 2022-12-13

## 2022-12-13 RX ADMIN — ACETAMINOPHEN 975 MG: 325 TABLET ORAL at 20:14

## 2022-12-13 RX ADMIN — ROPINIROLE 2 MG: 2 TABLET, FILM COATED ORAL at 20:14

## 2022-12-14 VITALS
RESPIRATION RATE: 18 BRPM | OXYGEN SATURATION: 97 % | DIASTOLIC BLOOD PRESSURE: 75 MMHG | SYSTOLIC BLOOD PRESSURE: 143 MMHG | HEART RATE: 94 BPM | TEMPERATURE: 97.6 F

## 2022-12-14 NOTE — ED NOTES
[facility] will  at [address] [place] [address], [address] [place] [address]  12/14/22 7274

## 2022-12-14 NOTE — ED PROVIDER NOTES
History  Chief Complaint   Patient presents with   • Fall     Per ems pt slipped and hit the back of her head  No LOC or thinners  78-year-old woman with relevant past medical history of restless leg syndrome presents after fall  She was taking her nightly ropinirole when she lost her balance and fell backwards hitting her head  She does not recall exactly how she fell  She denies any precipitating chest pain, dyspnea, palpitations, or lightheadedness  At this time, she is reporting posterior head pain and left shoulder pain  However, she has a chronic left shoulder dislocation  She sees orthopedics, and they have recommended she does not have repeat surgery  The shoulder appears at baseline  He denies chest pain, back pain, neck pain, abdominal pain, or right upper or bilateral lower extremity pain  Prior to Admission Medications   Prescriptions Last Dose Informant Patient Reported? Taking?    Ascorbic Acid, Vitamin C, (VITAMIN C) 100 MG tablet   Yes No   Sig: Take 400 mg by mouth daily   MELATONIN PO   Yes No   Sig: Take by mouth   Multiple Vitamins-Minerals (CENTRUM SILVER PO)   Yes No   Sig: Take 1 tablet by mouth daily   OXYCODONE HCL PO   Yes No   Sig: Take by mouth   acetaminophen (TYLENOL) 650 mg CR tablet   No No   Sig: Take 1 tablet (650 mg total) by mouth every 8 (eight) hours as needed for mild pain   ferrous gluconate (FERGON) 324 mg tablet   No No   Sig: Take 1 tablet (324 mg total) by mouth in the morning   folic acid (FOLVITE) 1 mg tablet   Yes No   Sig: Take 2,000 mcg by mouth daily   hydroxychloroquine (PLAQUENIL) 200 mg tablet   Yes No   Si pill am 1/2 pill pm   levothyroxine 50 mcg tablet   No No   Sig: Take 1 tablet (50 mcg total) by mouth daily in the early morning   meloxicam (Mobic) 7 5 mg tablet   No No   Sig: Take 1 tablet (7 5 mg total) by mouth daily   methocarbamol (ROBAXIN) 750 mg tablet   No No   Sig: Take 1 tablet (750 mg total) by mouth 2 (two) times a day as needed for muscle spasms   methotrexate 2 5 MG tablet   No No   Sig: 3 tablets by mouth once a week on Thursday   nortriptyline (PAMELOR) 50 mg capsule   No No   Sig: Take 2 capsules (100 mg total) by mouth daily at bedtime   predniSONE 5 mg tablet   Yes No   Sig: Take 5 mg by mouth daily   rOPINIRole (REQUIP) 2 mg tablet   No No   Sig: Take 1 tablet (2 mg total) by mouth daily at bedtime   senna-docusate sodium (SENOKOT S) 8 6-50 mg per tablet   No No   Sig: Take 1 tablet by mouth 2 (two) times a day      Facility-Administered Medications: None       Past Medical History:   Diagnosis Date   • Acute blood loss anemia 9/9/2020   • Aftercare following joint replacement 9/9/2020    Patient had right reversed total shoulder arthroplasty   Pain was controlled when he left the hospital      • Anxiety    • Arthritis    • Depression    • Disease of thyroid gland     HYPO   • Femur neck fracture (HCC)    • GERD (gastroesophageal reflux disease)    • Hyperthyroidism     RESOLVED: [id number]   • Osteoporosis    • Polio    • PVD (peripheral vascular disease) ([facility] )    • Right BBB/left ant fasc block    • UTI (urinary tract infection)    • Varicella infection     LAST ASSESSED: [id number]       Past Surgical History:   Procedure Laterality Date   • APPENDECTOMY     • HIP ARTHROPLASTY Left 11/27/2017    Procedure: REMOVAL IM NAIL CONVERSION TO TOTAL HIP ARTHROPLASTY POSTERIOR APPROACH;  Surgeon: Ryan Rodriguez MD;  [facility];  Service: Orthopedics   • HIP FRACTURE SURGERY     • HIP SURGERY      both hips replaced;2013 left ,2014 right   • JOINT REPLACEMENT Right     HIP TOTAL   • UT CONV PREV HIP SURG TO TOT HIP ARTHROPLAS Left 10/4/2017    Procedure: Abiola Sosa removal of left hip;  Surgeon: Ryan Rodriguez MD;  [facility];  Service: Orthopedics   • UT RECONSTR TOTAL SHOULDER IMPLANT Right 9/2/2020    Procedure: ARTHROPLASTY SHOULDER REVERSE;  Surgeon: Shawn Bocanegra MD;  [facility];  Service: Orthopedics   • WA RECONSTR TOTAL SHOULDER IMPLANT Left 6/1/2021    Procedure: ARTHROPLASTY SHOULDER REVERSE;  Surgeon: Torrey Bell MD;  [facility];  Service: Orthopedics   • WA WRIST Clement Nipper LIG Right 5/24/2022    Procedure: RELEASE CARPAL TUNNEL ENDOSCOPIC-right;  Surgeon: Anne Marie Jarquin MD;  [facility];  Service: Orthopedics   • REVISION TOTAL HIP ARTHROPLASTY Right    • TOE AMPUTATION Left 7/24/2022    Procedure: 5TH METATARSAL RESECTION WITH BOTTOM FLAP CLOSURE;  Surgeon: Tenisha Oliva DPM;  [facility];  Service: Podiatry   • TONSILLECTOMY         Family History   Problem Relation Age of Onset   • Rheum arthritis Mother    • Rheum arthritis Sister    • Prostate cancer Brother      I have reviewed and agree with the history as documented  E-Cigarette/Vaping   • E-Cigarette Use Never User      E-Cigarette/Vaping Substances   • Nicotine No    • THC No    • CBD No    • Flavoring No    • Other No    • Unknown No      Social History     Tobacco Use   • Smoking status: Former   • Smokeless tobacco: Never   • Tobacco comments:     quit 20-30 years ago   Vaping Use   • Vaping Use: Never used   Substance Use Topics   • Alcohol use: Not Currently   • Drug use: Not Currently        Review of Systems   Constitutional: Negative for chills and fever  HENT: Negative for ear pain and sore throat  Eyes: Negative for pain and visual disturbance  Respiratory: Negative for cough, shortness of breath and wheezing  Cardiovascular: Negative for chest pain and palpitations  Gastrointestinal: Negative for abdominal pain, constipation, diarrhea and vomiting  Genitourinary: Negative for dysuria, frequency, hematuria and vaginal bleeding  Musculoskeletal: Negative for arthralgias and back pain  Skin: Negative for color change and rash  Neurological: Negative for seizures, syncope and headaches  Psychiatric/Behavioral: Negative for agitation and confusion  Physical Exam  ED Triage Vitals [12/13/22 1837]   Temperature Pulse Respirations Blood Pressure SpO2   97 6 °F (36 4 °C) 85 18 162/91 97 %      Temp Source Heart Rate Source Patient Position - Orthostatic VS BP Location FiO2 (%)   Tympanic Monitor Sitting Right arm --      Pain Score       No Pain             Orthostatic Vital Signs  Vitals:    12/13/22 1837 12/13/22 2030 12/13/22 2100   BP: 162/91 (!) 178/87 162/79   Pulse: 85 94 94   Patient Position - Orthostatic VS: Sitting         Physical Exam  Vitals and nursing note reviewed  Constitutional:       General: She is not in acute distress  Appearance: Normal appearance  She is well-developed  HENT:      Head: Normocephalic and atraumatic  Right Ear: External ear normal       Left Ear: External ear normal       Nose: Nose normal  No congestion  Cardiovascular:      Rate and Rhythm: Normal rate and regular rhythm  Pulmonary:      Effort: Pulmonary effort is normal  No respiratory distress  Breath sounds: Normal breath sounds  Abdominal:      Palpations: Abdomen is soft  Tenderness: There is no abdominal tenderness  There is no guarding or rebound  Musculoskeletal:      Cervical back: Normal range of motion and neck supple  No rigidity or tenderness  Comments: Left shoulder reduces and dislocates with ease  No upper or lower extremity pain with passive ROM  Skin:     General: Skin is warm and dry  Neurological:      General: No focal deficit present  Mental Status: She is alert and oriented to person, place, and time     Psychiatric:         Mood and Affect: Mood normal          Behavior: Behavior normal          ED Medications  Medications   rOPINIRole (REQUIP) tablet 2 mg (2 mg Oral Given 12/13/22 2014)   acetaminophen (TYLENOL) tablet 975 mg (975 mg Oral Given 12/13/22 2014)       Diagnostic Studies  Results Reviewed     None                 CT head without contrast   Final Result by Jiles Bumpers, MD (12/13 2230)      No acute intracranial abnormality  Workstation performed: [id number]               Procedures  Procedures      ED Course         SBIRT 20yo+    Flowsheet Row Most Recent Value   SBIRT (25 yo +)    In order to provide better care to our patients, we are screening all of our patients for alcohol and drug use  Would it be okay to ask you these screening questions? No Filed at: 12/13/2022 2037                MDM  Number of Diagnoses or Management Options  Fall, initial encounter  Injury of head, initial encounter  Diagnosis management comments: Presents with mechanical fall  No obvious signs of trauma  CT head negative  Left shoulder appears to be at baseline level of function given chronic dislocations  Patient able to ambulate prior to discharge  Patient in agreement with plan and questions were answered  Verbalized understanding of return precautions  Portions or all of this note were generated using voice recognition software  Occasional wrong word or "sound a like" substitutions may have occurred due to the inherent limitations of voice recognition software  Please interpret any errors within the intended context of the whole sentence or idea  Disposition  Final diagnoses:   Fall, initial encounter   Injury of head, initial encounter     Time reflects when diagnosis was documented in both MDM as applicable and the Disposition within this note     Time User Action Codes Description Comment    12/13/2022 10:38 PM Ingrid oFrd Add [Z11  BIXC] Fall, initial encounter     12/13/2022 10:38 PM Ingrid Ford Add [[id number]] Injury of head, initial encounter       ED Disposition     ED Disposition   Discharge    Condition   Stable    Date/Time   Tue Dec 13, 2022 11:17 PM    Comment   Geetha Rivera discharge to home/self care                 Follow-up Information     Follow up With Specialties Details Why [address], [place] Family Medicine Schedule an appointment as soon as possible for a visit in 3 days As needed [address] [address]  [phone]            Patient's Medications   Discharge Prescriptions    No medications on file     No discharge procedures on file  PDMP Review       Value Time User    PDMP Reviewed  Yes 7/22/2022 12:00 AM Dayanara Wynn           ED Provider  Attending physically available and evaluated Geetha Rivera I managed the patient along with the ED Attending      Electronically Signed by         Isabella Dong MD  12/13/22 0999       Isabella Dong MD  12/13/22 3596

## 2022-12-14 NOTE — ED NOTES
Transport with [facility]FFILIATED WITH [facility] ambulance @ 0200 12/14        Beulah Colmenarse RN  12/13/22 0948

## 2022-12-16 NOTE — ED ATTENDING ATTESTATION
12/13/2022  IAaron MD, saw and evaluated the patient  I have discussed the patient with the resident/non-physician practitioner and agree with the resident's/non-physician practitioner's findings, Plan of Care, and MDM as documented in the resident's/non-physician practitioner's note, except where noted  All available labs and Radiology studies were reviewed  I was present for key portions of any procedure(s) performed by the resident/non-physician practitioner and I was immediately available to provide assistance  At this point I agree with the current assessment done in the Emergency Department  I have conducted an independent evaluation of this patient a history and physical is as follows:    ED Course   69-year-old female status post fall with head strike  Patient states she fell backwards after losing balance hitting her head  Denies any associated injuries  Patient reports left shoulder pain and posterior head pain  Patient with known history of chronic left shoulder dislocation  Shoulder joint is at baseline per patient  Mild pain and tenderness over occiput of head  No focal neurodeficits  No midline C-spine tenderness  Impression fall with head strike plan to obtain CT imaging to exclude occult trauma    Pain control anticipate discharge with outpatient follow-up of negative ED work-up      Critical Care Time  Procedures

## 2022-12-22 NOTE — ASSESSMENT & PLAN NOTE
Patient would see orthopedics (Jerzy)  Xeljanz Counseling: I discussed with the patient the risks of Xeljanz therapy including increased risk of infection, liver issues, headache, diarrhea, or cold symptoms. Live vaccines should be avoided. They were instructed to call if they have any problems.

## 2023-01-17 DIAGNOSIS — G25.81 RESTLESS LEGS SYNDROME: ICD-10-CM

## 2023-01-18 RX ORDER — ROPINIROLE 2 MG/1
2 TABLET, FILM COATED ORAL
Qty: 90 TABLET | Refills: 0 | Status: SHIPPED | OUTPATIENT
Start: 2023-01-18 | End: 2023-01-20

## 2023-01-20 DIAGNOSIS — G25.81 RESTLESS LEGS SYNDROME: ICD-10-CM

## 2023-01-20 RX ORDER — ROPINIROLE 2 MG/1
2 TABLET, FILM COATED ORAL
Qty: 90 TABLET | Refills: 0 | Status: SHIPPED | OUTPATIENT
Start: 2023-01-20

## 2023-02-03 ENCOUNTER — APPOINTMENT (EMERGENCY)
Dept: RADIOLOGY | Facility: HOSPITAL | Age: 78
End: 2023-02-03

## 2023-02-03 ENCOUNTER — HOSPITAL ENCOUNTER (EMERGENCY)
Facility: HOSPITAL | Age: 78
Discharge: HOME | End: 2023-02-03
Attending: EMERGENCY MEDICINE

## 2023-02-03 VITALS
TEMPERATURE: 97.5 F | OXYGEN SATURATION: 96 % | DIASTOLIC BLOOD PRESSURE: 75 MMHG | HEART RATE: 68 BPM | SYSTOLIC BLOOD PRESSURE: 153 MMHG | RESPIRATION RATE: 16 BRPM

## 2023-02-03 DIAGNOSIS — M25.562 ACUTE PAIN OF BOTH KNEES: ICD-10-CM
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
## 2023-02-03 DIAGNOSIS — R26.2 AMBULATORY DYSFUNCTION: ICD-10-CM

## 2023-02-03 DIAGNOSIS — M25.561 ACUTE PAIN OF BOTH KNEES: ICD-10-CM

## 2023-02-03 RX ORDER — ACETAMINOPHEN 325 MG/1
650 TABLET ORAL ONCE
Status: COMPLETED | OUTPATIENT
Start: 2023-02-03 | End: 2023-02-03

## 2023-02-03 RX ADMIN — ACETAMINOPHEN 650 MG: 325 TABLET ORAL at 07:48

## 2023-02-03 NOTE — ED PROVIDER NOTES
History  Chief Complaint   Patient presents with   • Fall     Pt states that she was reaching for something on the table when she lost her balance and fell onto knees; c/o bilateral knee and right foot pain; per EMS, pt was brought in because she could barely bear weight to stand and patient states this is ongoing that she cannot walk even with her walker     HPI    18-year-old female presenting from home for evaluation after a fall  Patient states that she was reaching for something, walker gave out underneath her, she fell forwards and landed on her knees  EMS was called for a lift assist, was unable to stand even with assistance of her walker and they brought her in for evaluation  Patient is complaining of bilateral knee pain  Denies any other injuries  Denies head strike or loss of consciousness  Denies anticoagulant or antiplatelet usage  Prior to Admission Medications   Prescriptions Last Dose Informant Patient Reported? Taking?    Ascorbic Acid, Vitamin C, (VITAMIN C) 100 MG tablet   Yes No   Sig: Take 400 mg by mouth daily   MELATONIN PO   Yes No   Sig: Take by mouth   Multiple Vitamins-Minerals (CENTRUM SILVER PO)   Yes No   Sig: Take 1 tablet by mouth daily   OXYCODONE HCL PO   Yes No   Sig: Take by mouth   acetaminophen (TYLENOL) 650 mg CR tablet   No No   Sig: Take 1 tablet (650 mg total) by mouth every 8 (eight) hours as needed for mild pain   ferrous gluconate (FERGON) 324 mg tablet   No No   Sig: Take 1 tablet (324 mg total) by mouth in the morning   folic acid (FOLVITE) 1 mg tablet   Yes No   Sig: Take 2,000 mcg by mouth daily   hydroxychloroquine (PLAQUENIL) 200 mg tablet   Yes No   Si pill am 1/2 pill pm   levothyroxine 50 mcg tablet   No No   Sig: Take 1 tablet (50 mcg total) by mouth daily in the early morning   meloxicam (Mobic) 7 5 mg tablet   No No   Sig: Take 1 tablet (7 5 mg total) by mouth daily   methocarbamol (ROBAXIN) 750 mg tablet   No No   Sig: Take 1 tablet (750 mg total) by mouth 2 (two) times a day as needed for muscle spasms   methotrexate 2 5 MG tablet   No No   Sig: 3 tablets by mouth once a week on Thursday   nortriptyline (PAMELOR) 50 mg capsule   No No   Sig: Take 2 capsules (100 mg total) by mouth daily at bedtime   predniSONE 5 mg tablet   Yes No   Sig: Take 5 mg by mouth daily   rOPINIRole (REQUIP) 2 mg tablet   No No   Sig: Take 1 tablet (2 mg total) by mouth daily at bedtime   senna-docusate sodium (SENOKOT S) 8 6-50 mg per tablet   No No   Sig: Take 1 tablet by mouth 2 (two) times a day      Facility-Administered Medications: None       Past Medical History:   Diagnosis Date   • Acute blood loss anemia 9/9/2020   • Aftercare following joint replacement 9/9/2020    Patient had right reversed total shoulder arthroplasty   Pain was controlled when he left the hospital      • Anxiety    • Arthritis    • Depression    • Disease of thyroid gland     HYPO   • Femur neck fracture (HCC)    • GERD (gastroesophageal reflux disease)    • Hyperthyroidism     RESOLVED: [id number]   • Osteoporosis    • Polio    • PVD (peripheral vascular disease) ([facility] [address] )    • Right BBB/left ant fasc block    • UTI (urinary tract infection)    • Varicella infection     LAST ASSESSED: [id number]       Past Surgical History:   Procedure Laterality Date   • APPENDECTOMY     • HIP ARTHROPLASTY Left 11/27/2017    Procedure: REMOVAL IM NAIL CONVERSION TO TOTAL HIP ARTHROPLASTY POSTERIOR APPROACH;  Surgeon: Monica Melchor MD;  [facility];  Service: Orthopedics   • HIP FRACTURE SURGERY     • HIP SURGERY      both hips replaced;2013 left ,2014 right   • JOINT REPLACEMENT Right     HIP TOTAL   • MD ARTHROPLASTY GLENOHUMERAL JOINT TOTAL SHOULDER Right 9/2/2020    Procedure: ARTHROPLASTY SHOULDER REVERSE;  Surgeon: Otto Perez MD;  [facility];  Service: Orthopedics   • MD ARTHROPLASTY GLENOHUMERAL JOINT TOTAL SHOULDER Left 6/1/2021    Procedure: ARTHROPLASTY SHOULDER REVERSE;  Surgeon: Reginadl Jordan Rafaela Monge MD;  [facility];  Service: Orthopedics   • TN CONV PREV HIP TOT HIP ARTHRP W/WO AGRFT/ALGRFT Left 10/4/2017    Procedure: Vicci Mood removal of left hip;  Surgeon: Alexia Wright MD;  [facility];  Service: Orthopedics   • TN [address] [facility],[address] 3 [address] Floors WRST SURG W/RLS TRANSVRS CARPL LIGM Right 5/24/2022    Procedure: RELEASE CARPAL TUNNEL ENDOSCOPIC-right;  Surgeon: Kathy Sharma MD;  [facility];  Service: Orthopedics   • REVISION TOTAL HIP ARTHROPLASTY Right    • TOE AMPUTATION Left 7/24/2022    Procedure: 5TH METATARSAL RESECTION WITH BOTTOM FLAP CLOSURE;  Surgeon: Marko Edwards DPM;  Location[facility];  Service: Podiatry   • TONSILLECTOMY         Family History   Problem Relation Age of Onset   • Rheum arthritis Mother    • Rheum arthritis Sister    • Prostate cancer Brother      I have reviewed and agree with the history as documented  E-Cigarette/Vaping   • E-Cigarette Use Never User      E-Cigarette/Vaping Substances   • Nicotine No    • THC No    • CBD No    • Flavoring No    • Other No    • Unknown No      Social History     Tobacco Use   • Smoking status: Former   • Smokeless tobacco: Never   • Tobacco comments:     quit 20-30 years ago   Vaping Use   • Vaping Use: Never used   Substance Use Topics   • Alcohol use: Not Currently   • Drug use: Not Currently        Review of Systems   Constitutional: Negative for chills and fever  HENT: Negative for sore throat  Respiratory: Negative for cough and shortness of breath  Cardiovascular: Negative for chest pain, palpitations and leg swelling  Gastrointestinal: Negative for abdominal pain, diarrhea, nausea and vomiting  Genitourinary: Negative for dysuria and frequency  Musculoskeletal: Positive for gait problem  Negative for myalgias  Skin: Negative for rash and wound  Neurological: Negative for dizziness, light-headedness and headaches  All other systems reviewed and are negative        Physical Exam  ED Triage Vitals   Temperature Pulse Respirations Blood Pressure SpO2   02/03/23 0749 02/03/23 0700 02/03/23 0656 02/03/23 0700 02/03/23 0700   97 5 °F (36 4 °C) 68 16 153/75 96 %      Temp Source Heart Rate Source Patient Position - Orthostatic VS BP Location FiO2 (%)   02/03/23 0749 02/03/23 0700 02/03/23 0700 02/03/23 0700 --   Oral Monitor Lying Right arm       Pain Score       02/03/23 0656       7             Orthostatic Vital Signs  Vitals:    02/03/23 0700   BP: 153/75   Pulse: 68   Patient Position - Orthostatic VS: Lying       Physical Exam  Vitals and nursing note reviewed  Constitutional:       General: She is not in acute distress  Appearance: Normal appearance  She is ill-appearing (frail appearring)  She is not toxic-appearing  HENT:      Head: Normocephalic and atraumatic  Right Ear: External ear normal       Left Ear: External ear normal       Nose: Nose normal       Mouth/Throat:      Mouth: Mucous membranes are moist       Pharynx: Oropharynx is clear  Eyes:      General: No scleral icterus  Extraocular Movements: Extraocular movements intact  Neck:      Comments: No midline C, T, L spine tenderness  Cardiovascular:      Rate and Rhythm: Normal rate and regular rhythm  Pulses: Normal pulses  Pulmonary:      Effort: Pulmonary effort is normal  No respiratory distress  Breath sounds: Normal breath sounds  Abdominal:      Palpations: Abdomen is soft  Tenderness: There is no abdominal tenderness  Musculoskeletal:         General: Normal range of motion  Cervical back: Normal range of motion and neck supple  Comments: Decreased ROM of bilateral shoulders, chronic per patient  No tenderness in hips or knees  Full ROM of BL lower extremities  BL venous stasis  Skin:     General: Skin is warm  Capillary Refill: Capillary refill takes less than 2 seconds  Neurological:      General: No focal deficit present        Mental Status: She is alert and oriented to person, place, and time  ED Medications  Medications   acetaminophen (TYLENOL) tablet 650 mg (650 mg Oral Given 2/3/23 0748)       Diagnostic Studies  Results Reviewed     None                 XR knee 4+ views Right injury   ED Interpretation by Brittany Foster MD (02/03 1102)   No fracture  Final Result by David Bran MD (02/03 1614)      No acute osseous abnormality  Workstation performed: [id number]         XR knee 4+ views left injury   ED Interpretation by Brittany Foster MD (02/03 1102)   No fracture  Final Result by David Bran MD (02/03 1614)      No acute osseous abnormality  Workstation performed: [id number]               Procedures  Procedures      ED Course                                       Medical Decision Making  69-year-old female presenting for evaluation of bilateral knee pain after mechanical fall  On exam the patient is frail-appearing, has small superficial abrasions to her bilateral knees  Otherwise there are no other obvious signs of trauma  She has full active and passive range of motion of her bilateral lower extremities  We will x-ray her knees to evaluate for traumatic injury  Pain control as needed  Bilateral knee x-rays were negative for acute fracture  Discussed results with the patient  Patient was ambulatory challenged, unable to ambulate even with the assistance of a walker  I encouraged admission for ambulatory dysfunction which the patient declined  Patient states that she has assistance at home and does not want to be admitted to the hospital or put into a rehab facility  Discussed the risks with discharge with ambulatory dysfunction, patient understands but still would like to discharged with outpatient follow-up  Advised the patient to follow-up with her primary care physician and gave strict return to ED precautions      I reviewed all testing with the patient: xrays  I gave oral return precautions for what to return for in addition to the written return precautions  The patient (and any family present: n/a) verbalized understanding of the discharge instructions and warnings that would necessitate return to the Emergency Department  I specifically highlighted areas of special concern regarding the written and verbal discharge instructions and return precautions  All questions were answered prior to discharge  Acute pain of both knees: acute illness or injury  Ambulatory dysfunction: self-limited or minor problem  Fall, initial encounter: acute illness or injury  Amount and/or Complexity of Data Reviewed  Radiology: ordered and independent interpretation performed  Risk  OTC drugs  Disposition  Final diagnoses:   Fall, initial encounter   Acute pain of both knees   Ambulatory dysfunction     Time reflects when diagnosis was documented in both MDM as applicable and the Disposition within this note     Time User Action Codes Description Comment    2/3/2023  9:03 AM Estrella Orozco [W19  HFBQ] Fall, initial encounter     2/3/2023  9:03 AM Estrella Orozco [S47 600,  M25 562] Acute pain of both knees     2/3/2023 10:26 AM Estrella Orozco [R26 2] Ambulatory dysfunction       ED Disposition     ED Disposition   Discharge    Condition   Stable    Date/Time   Fri Feb 3, 2023 10:27 AM    Comment   Tata Rivera discharge to home/self care  Follow-up Information     Follow up With Specialties Details Why Contact Info Additional Information    Damon Gant MD Family Medicine  For Emergency Department Follow-up [address] [facility] Emergency Department Emergency Medicine  If symptoms worsen [address] [id number]  [address] Emergency Department, [address], [address] [facility] Discharge Medication List as of 2/3/2023 10:28 AM      CONTINUE these medications which have NOT CHANGED    Details   acetaminophen (TYLENOL) 650 mg CR tablet Take 1 tablet (650 mg total) by mouth every 8 (eight) hours as needed for mild pain, Starting Tue 5/24/2022, Normal      Ascorbic Acid, Vitamin C, (VITAMIN C) 100 MG tablet Take 400 mg by mouth daily, Historical Med      ferrous gluconate (FERGON) 324 mg tablet Take 1 tablet (324 mg total) by mouth in the morning, Starting Thu 8/4/2022, Until Wed 11/2/2022, No Print      folic acid (FOLVITE) 1 mg tablet Take 2,000 mcg by mouth daily, Starting Thu 2/24/2022, Historical Med      hydroxychloroquine (PLAQUENIL) 200 mg tablet 1 pill am 1/2 pill pm, Starting Sat 4/4/2020, Historical Med      levothyroxine 50 mcg tablet Take 1 tablet (50 mcg total) by mouth daily in the early morning, Starting Wed 12/7/2022, Until Fri 1/6/2023, Normal      MELATONIN PO Take by mouth, Historical Med      meloxicam (Mobic) 7 5 mg tablet Take 1 tablet (7 5 mg total) by mouth daily, Starting Wed 6/15/2022, Normal      methocarbamol (ROBAXIN) 750 mg tablet Take 1 tablet (750 mg total) by mouth 2 (two) times a day as needed for muscle spasms, Starting Mon 11/21/2022, Normal      methotrexate 2 5 MG tablet 3 tablets by mouth once a week on Thursday, No Print      Multiple Vitamins-Minerals (CENTRUM SILVER PO) Take 1 tablet by mouth daily, Starting Tue 12/13/2016, Historical Med      nortriptyline (PAMELOR) 50 mg capsule Take 2 capsules (100 mg total) by mouth daily at bedtime, Starting Mon 11/21/2022, Until Sun 2/19/2023, Normal      OXYCODONE HCL PO Take by mouth, Historical Med      predniSONE 5 mg tablet Take 5 mg by mouth daily, Starting Mon 9/28/2020, Historical Med      rOPINIRole (REQUIP) 2 mg tablet Take 1 tablet (2 mg total) by mouth daily at bedtime, Starting Fri 1/20/2023, Normal      senna-docusate sodium (SENOKOT S) 8 6-50 mg per tablet Take 1 tablet by mouth 2 (two) times a day, Starting Wed 8/3/2022, No Print           No discharge procedures on file  PDMP Review       Value Time User    PDMP Reviewed  Yes 7/22/2022 12:00 AM Fabio Jessica           ED Provider  Attending physically available and evaluated Geetha Rivera I managed the patient along with the ED Attending      Electronically Signed by         Aziza Sheffield DO  02/04/23 0754

## 2023-02-03 NOTE — DISCHARGE INSTRUCTIONS
Your imaging was negative today  Please use the following pain medications as prescribed:  - Tylenol 650mg every 6 hours    Follow up with your primary care physician  Return to the ED if symptoms worsen

## 2023-02-03 NOTE — ED ATTENDING ATTESTATION
2/3/2023  IMaximo MD, saw and evaluated the patient  I have discussed the patient with the resident/non-physician practitioner and agree with the resident's/non-physician practitioner's findings, Plan of Care, and MDM as documented in the resident's/non-physician practitioner's note, except where noted  All available labs and Radiology studies were reviewed  I was present for key portions of any procedure(s) performed by the resident/non-physician practitioner and I was immediately available to provide assistance  At this point I agree with the current assessment done in the Emergency Department  I have conducted an independent evaluation of this patient a history and physical is as follows:    ED Course     69-year-old female presenting to the emergency department for evaluation of bilateral knee pain  Patient has a history of infrequent falls  States that she was ambulating with a walker and lost her balance, falling onto her walker  Patient denies hitting her head  Negative LOC  Patient denying any chest pain, cough, shortness of breath, abdominal pain, nausea, vomiting, diarrhea  10 systems reviewed and negative except as noted  Past Medical History:   Diagnosis Date   • Acute blood loss anemia 9/9/2020   • Aftercare following joint replacement 9/9/2020    Patient had right reversed total shoulder arthroplasty  Pain was controlled when he left the hospital      • Anxiety    • Arthritis    • Depression    • Disease of thyroid gland     HYPO   • Femur neck fracture (HCC)    • GERD (gastroesophageal reflux disease)    • Hyperthyroidism     RESOLVED: [id number]   • Osteoporosis    • Polio    • PVD (peripheral vascular disease) (HCC)    • Right BBB/left ant fasc block    • UTI (urinary tract infection)    • Varicella infection     LAST ASSESSED: [id number]       On examination frail and cachectic elderly female sitting upright in the stretcher  Head is normocephalic and atraumatic  Eyelids lashes normal   Pupils are 3 mm bilaterally  Extraocular movements intact  Mucous membranes are dry  Neck is nontender and supple with full range of motion  Lungs are clear to auscultation bilaterally with no wheezes rales or rhonchi  Heart is regular rate and rhythm with no murmurs rubs or gallops  Chest wall is nontender  Abdomen is nondistended, soft and nontender  Patient has scars on her hips from previous hip replacements  Right hip is slightly internally rotated, but patient states that that is her baseline  Mild erythema to bilateral patella  Bilateral patellar tenderness to palpation  No obvious deformity or effusion  Patient with mild erythema and swelling bilateral feet and lower extremities, right greater than left  Dorsalis pedis and posterior tibial pulses intact  GCS 15  Cranial nerves II through XII are intact  MEDICAL DECISION MAKING    Number and Complexity of Problems  • Differential diagnosis: Patellar fracture bilaterally  Patient reports that this was a mechanical fall  Patient states that she feels safe at home and does not require any additional services  Medical Decision Making Data  • External documents reviewed: I reviewed last visit from 12/13/2022  • My X-ray interpretation: No acute fracture  XR knee 4+ views Right injury   ED Interpretation   No fracture  XR knee 4+ views left injury   ED Interpretation   No fracture  Labs Reviewed - No data to display    • Considered admission for: Ambulatory dysfunction  Treatment and Disposition  ED course: Patient underwent evaluation with bilateral knee x-rays which were reviewed by myself  Patient was treated for pain with Tylenol  Ambulation trial prior to discharge  Shared decision making: Patient requesting discharge  Code status: Full code                Critical Care Time  Procedures

## 2023-02-03 NOTE — ED NOTES
Pt able to ambulate 1 step away from stretcher with walker and 1 assist      Ana Hurtado RN  02/03/23 0941

## 2023-02-06 ENCOUNTER — APPOINTMENT (INPATIENT)
Dept: RADIOLOGY | Facility: HOSPITAL | Age: 78
End: 2023-02-06

## 2023-02-06 ENCOUNTER — APPOINTMENT (EMERGENCY)
Dept: RADIOLOGY | Facility: HOSPITAL | Age: 78
End: 2023-02-06

## 2023-02-06 ENCOUNTER — HOSPITAL ENCOUNTER (INPATIENT)
Facility: HOSPITAL | Age: 78
LOS: 2 days | Discharge: HOME HEALTH | End: 2023-02-08
Attending: EMERGENCY MEDICINE | Admitting: INTERNAL MEDICINE

## 2023-02-06 DIAGNOSIS — S43.004A SHOULDER DISLOCATION, RIGHT, INITIAL ENCOUNTER: ICD-10-CM

## 2023-02-06 DIAGNOSIS — T84.028A INSTABILITY OF REVERSE TOTAL ARTHROPLASTY OF LEFT SHOULDER (HCC): ICD-10-CM

## 2023-02-06 DIAGNOSIS — Z96.612 INSTABILITY OF REVERSE TOTAL ARTHROPLASTY OF LEFT SHOULDER (HCC): ICD-10-CM

## 2023-02-06 DIAGNOSIS — M86.172 OTHER ACUTE OSTEOMYELITIS OF LEFT FOOT (HCC): ICD-10-CM

## 2023-02-06 DIAGNOSIS — E87.1 HYPONATREMIA: ICD-10-CM

## 2023-02-06 DIAGNOSIS — R26.2 AMBULATORY DYSFUNCTION: Primary | ICD-10-CM

## 2023-02-06 DIAGNOSIS — M79.605 PAIN IN BOTH LOWER EXTREMITIES: ICD-10-CM

## 2023-02-06 DIAGNOSIS — M79.673 FOOT PAIN: ICD-10-CM

## 2023-02-06 DIAGNOSIS — M79.604 PAIN IN BOTH LOWER EXTREMITIES: ICD-10-CM

## 2023-02-06 DIAGNOSIS — S42.91XA SHOULDER FRACTURE, RIGHT: ICD-10-CM

## 2023-02-06 DIAGNOSIS — L03.90 CELLULITIS: ICD-10-CM

## 2023-02-06 DIAGNOSIS — L89.894 PRESSURE INJURY OF LEFT FOOT, STAGE 4 (HCC): ICD-10-CM

## 2023-02-06 DIAGNOSIS — S43.006A SHOULDER DISLOCATION: ICD-10-CM

## 2023-02-06 PROBLEM — M79.606 LEG PAIN: Status: ACTIVE | Noted: 2023-02-06

## 2023-02-06 PROBLEM — R13.10 DYSPHAGIA: Status: ACTIVE | Noted: 2023-02-06

## 2023-02-06 LAB
ANION GAP SERPL CALCULATED.3IONS-SCNC: 4 MMOL/L (ref 4–13)
ANISOCYTOSIS BLD QL SMEAR: PRESENT
BASOPHILS # BLD MANUAL: 0 THOUSAND/UL (ref 0–0.1)
BASOPHILS NFR MAR MANUAL: 0 % (ref 0–1)
BUN SERPL-MCNC: 23 MG/DL (ref 5–25)
CALCIUM SERPL-MCNC: 9 MG/DL (ref 8.3–10.1)
CHLORIDE SERPL-SCNC: 108 MMOL/L (ref 96–108)
CO2 SERPL-SCNC: 22 MMOL/L (ref 21–32)
CREAT SERPL-MCNC: 0.52 MG/DL (ref 0.6–1.3)
CRP SERPL QL: 11.1 MG/L
EOSINOPHIL # BLD MANUAL: 0 THOUSAND/UL (ref 0–0.4)
EOSINOPHIL NFR BLD MANUAL: 0 % (ref 0–6)
ERYTHROCYTE [DISTWIDTH] IN BLOOD BY AUTOMATED COUNT: 16.3 % (ref 11.6–15.1)
ERYTHROCYTE [SEDIMENTATION RATE] IN BLOOD: 18 MM/HOUR (ref 0–29)
GFR SERPL CREATININE-BSD FRML MDRD: 91 ML/MIN/1.73SQ M
GLUCOSE SERPL-MCNC: 113 MG/DL (ref 65–140)
GLUCOSE SERPL-MCNC: 98 MG/DL (ref 65–140)
GLUCOSE SERPL-MCNC: 99 MG/DL (ref 65–140)
HCT VFR BLD AUTO: 36.1 % (ref 34.8–46.1)
HGB BLD-MCNC: 11 G/DL (ref 11.5–15.4)
LYMPHOCYTES # BLD AUTO: 0.41 THOUSAND/UL (ref 0.6–4.47)
LYMPHOCYTES # BLD AUTO: 4 % (ref 14–44)
MCH RBC QN AUTO: 27.8 PG (ref 26.8–34.3)
MCHC RBC AUTO-ENTMCNC: 30.5 G/DL (ref 31.4–37.4)
MCV RBC AUTO: 91 FL (ref 82–98)
MONOCYTES # BLD AUTO: 0.2 THOUSAND/UL (ref 0–1.22)
MONOCYTES NFR BLD: 2 % (ref 4–12)
NEUTROPHILS # BLD MANUAL: 9.42 THOUSAND/UL (ref 1.85–7.62)
NEUTS SEG NFR BLD AUTO: 92 % (ref 43–75)
OVALOCYTES BLD QL SMEAR: PRESENT
PLATELET # BLD AUTO: 262 THOUSANDS/UL (ref 149–390)
PLATELET BLD QL SMEAR: ADEQUATE
PMV BLD AUTO: 8.6 FL (ref 8.9–12.7)
POIKILOCYTOSIS BLD QL SMEAR: PRESENT
POLYCHROMASIA BLD QL SMEAR: PRESENT
POTASSIUM SERPL-SCNC: 4.1 MMOL/L (ref 3.5–5.3)
RBC # BLD AUTO: 3.96 MILLION/UL (ref 3.81–5.12)
RBC MORPH BLD: PRESENT
SODIUM SERPL-SCNC: 134 MMOL/L (ref 135–147)
VANCOMYCIN SERPL-MCNC: 23.9 UG/ML (ref 10–20)
VARIANT LYMPHS # BLD AUTO: 2 %
WBC # BLD AUTO: 10.24 THOUSAND/UL (ref 4.31–10.16)

## 2023-02-06 PROCEDURE — 0RWJXJZ REVISION OF SYNTHETIC SUBSTITUTE IN RIGHT SHOULDER JOINT, EXTERNAL APPROACH: ICD-10-PCS | Performed by: EMERGENCY MEDICINE

## 2023-02-06 PROCEDURE — 0HBNXZZ EXCISION OF LEFT FOOT SKIN, EXTERNAL APPROACH: ICD-10-PCS | Performed by: PODIATRIST

## 2023-02-06 RX ORDER — ENOXAPARIN SODIUM 100 MG/ML
40 INJECTION SUBCUTANEOUS
Status: DISCONTINUED | OUTPATIENT
Start: 2023-02-07 | End: 2023-02-08 | Stop reason: HOSPADM

## 2023-02-06 RX ORDER — METHOCARBAMOL 750 MG/1
750 TABLET, FILM COATED ORAL 2 TIMES DAILY PRN
Status: DISCONTINUED | OUTPATIENT
Start: 2023-02-06 | End: 2023-02-08 | Stop reason: HOSPADM

## 2023-02-06 RX ORDER — OXYCODONE HYDROCHLORIDE 5 MG/1
5 TABLET ORAL EVERY 6 HOURS PRN
Status: DISCONTINUED | OUTPATIENT
Start: 2023-02-06 | End: 2023-02-08 | Stop reason: HOSPADM

## 2023-02-06 RX ORDER — LEVOTHYROXINE SODIUM 0.05 MG/1
50 TABLET ORAL
Status: DISCONTINUED | OUTPATIENT
Start: 2023-02-06 | End: 2023-02-08 | Stop reason: HOSPADM

## 2023-02-06 RX ORDER — DEXTROSE AND SODIUM CHLORIDE 5; .9 G/100ML; G/100ML
75 INJECTION, SOLUTION INTRAVENOUS CONTINUOUS
Status: DISCONTINUED | OUTPATIENT
Start: 2023-02-06 | End: 2023-02-07

## 2023-02-06 RX ORDER — HYDROMORPHONE HCL IN WATER/PF 6 MG/30 ML
0.2 PATIENT CONTROLLED ANALGESIA SYRINGE INTRAVENOUS ONCE
Status: DISCONTINUED | OUTPATIENT
Start: 2023-02-06 | End: 2023-02-06

## 2023-02-06 RX ORDER — DOXYCYCLINE HYCLATE 50 MG/1
324 CAPSULE, GELATIN COATED ORAL DAILY
Status: DISCONTINUED | OUTPATIENT
Start: 2023-02-06 | End: 2023-02-08 | Stop reason: HOSPADM

## 2023-02-06 RX ORDER — MULTIVIT WITH MINERALS/LUTEIN
400 TABLET ORAL DAILY
Status: DISCONTINUED | OUTPATIENT
Start: 2023-02-06 | End: 2023-02-08 | Stop reason: HOSPADM

## 2023-02-06 RX ORDER — VANCOMYCIN HYDROCHLORIDE 500 MG/100ML
500 INJECTION, SOLUTION INTRAVENOUS EVERY 12 HOURS
Status: DISCONTINUED | OUTPATIENT
Start: 2023-02-06 | End: 2023-02-06

## 2023-02-06 RX ORDER — NORTRIPTYLINE HYDROCHLORIDE 50 MG/1
100 CAPSULE ORAL
Status: DISCONTINUED | OUTPATIENT
Start: 2023-02-06 | End: 2023-02-08 | Stop reason: HOSPADM

## 2023-02-06 RX ORDER — AMOXICILLIN 250 MG
1 CAPSULE ORAL 2 TIMES DAILY
Status: DISCONTINUED | OUTPATIENT
Start: 2023-02-06 | End: 2023-02-08 | Stop reason: HOSPADM

## 2023-02-06 RX ORDER — ACETAMINOPHEN 325 MG/1
650 TABLET ORAL EVERY 6 HOURS PRN
Status: DISCONTINUED | OUTPATIENT
Start: 2023-02-06 | End: 2023-02-08 | Stop reason: HOSPADM

## 2023-02-06 RX ORDER — DIAZEPAM 5 MG/ML
0.5 INJECTION, SOLUTION INTRAMUSCULAR; INTRAVENOUS ONCE
Status: DISCONTINUED | OUTPATIENT
Start: 2023-02-06 | End: 2023-02-06

## 2023-02-06 RX ORDER — PREDNISONE 1 MG/1
5 TABLET ORAL DAILY
Status: DISCONTINUED | OUTPATIENT
Start: 2023-02-06 | End: 2023-02-08 | Stop reason: HOSPADM

## 2023-02-06 RX ORDER — FOLIC ACID 1 MG/1
2000 TABLET ORAL DAILY
Status: DISCONTINUED | OUTPATIENT
Start: 2023-02-06 | End: 2023-02-08 | Stop reason: HOSPADM

## 2023-02-06 RX ORDER — LABETALOL HYDROCHLORIDE 5 MG/ML
5 INJECTION, SOLUTION INTRAVENOUS ONCE
Status: COMPLETED | OUTPATIENT
Start: 2023-02-06 | End: 2023-02-06

## 2023-02-06 RX ORDER — HYDROXYCHLOROQUINE SULFATE 200 MG/1
100 TABLET, FILM COATED ORAL EVERY EVENING
Status: DISCONTINUED | OUTPATIENT
Start: 2023-02-06 | End: 2023-02-08 | Stop reason: HOSPADM

## 2023-02-06 RX ORDER — ROPINIROLE 1 MG/1
2 TABLET, FILM COATED ORAL
Status: DISCONTINUED | OUTPATIENT
Start: 2023-02-06 | End: 2023-02-08 | Stop reason: HOSPADM

## 2023-02-06 RX ORDER — VANCOMYCIN HYDROCHLORIDE 1 G/200ML
25 INJECTION, SOLUTION INTRAVENOUS ONCE
Status: COMPLETED | OUTPATIENT
Start: 2023-02-06 | End: 2023-02-06

## 2023-02-06 RX ORDER — HYDROXYCHLOROQUINE SULFATE 200 MG/1
200 TABLET, FILM COATED ORAL EVERY MORNING
Status: DISCONTINUED | OUTPATIENT
Start: 2023-02-06 | End: 2023-02-08 | Stop reason: HOSPADM

## 2023-02-06 RX ADMIN — HYDROXYCHLOROQUINE SULFATE 100 MG: 200 TABLET ORAL at 17:23

## 2023-02-06 RX ADMIN — FOLIC ACID 2000 MCG: 1 TABLET ORAL at 11:21

## 2023-02-06 RX ADMIN — OXYCODONE HYDROCHLORIDE 5 MG: 5 TABLET ORAL at 07:38

## 2023-02-06 RX ADMIN — LABETALOL HYDROCHLORIDE 5 MG: 5 INJECTION, SOLUTION INTRAVENOUS at 18:02

## 2023-02-06 RX ADMIN — SENNOSIDES AND DOCUSATE SODIUM 1 TABLET: 8.6; 5 TABLET ORAL at 17:23

## 2023-02-06 RX ADMIN — ACETAMINOPHEN 650 MG: 325 TABLET ORAL at 07:38

## 2023-02-06 RX ADMIN — DEXTROSE AND SODIUM CHLORIDE 75 ML/HR: 5; .9 INJECTION, SOLUTION INTRAVENOUS at 06:48

## 2023-02-06 RX ADMIN — FERROUS GLUCONATE 324 MG: 324 TABLET ORAL at 11:22

## 2023-02-06 RX ADMIN — OXYCODONE HYDROCHLORIDE 5 MG: 5 TABLET ORAL at 15:37

## 2023-02-06 RX ADMIN — PREDNISONE 5 MG: 5 TABLET ORAL at 11:21

## 2023-02-06 RX ADMIN — NORTRIPTYLINE HYDROCHLORIDE 100 MG: 50 CAPSULE ORAL at 22:07

## 2023-02-06 RX ADMIN — METHOCARBAMOL TABLETS 750 MG: 750 TABLET, COATED ORAL at 11:31

## 2023-02-06 RX ADMIN — SENNOSIDES AND DOCUSATE SODIUM 1 TABLET: 8.6; 5 TABLET ORAL at 11:21

## 2023-02-06 RX ADMIN — ROPINIROLE HYDROCHLORIDE 2 MG: 1 TABLET, FILM COATED ORAL at 22:05

## 2023-02-06 RX ADMIN — METHOCARBAMOL TABLETS 750 MG: 750 TABLET, COATED ORAL at 22:05

## 2023-02-06 RX ADMIN — VANCOMYCIN HYDROCHLORIDE 1000 MG: 1 INJECTION, SOLUTION INTRAVENOUS at 10:42

## 2023-02-06 RX ADMIN — Medication 375 MG: at 13:27

## 2023-02-06 RX ADMIN — HYDROXYCHLOROQUINE SULFATE 200 MG: 200 TABLET ORAL at 11:22

## 2023-02-06 NOTE — ASSESSMENT & PLAN NOTE
· Pt presented s/p mechanical fall onto her right side at home, lives with her sister  Does have home health aides that come in at baseline   · Presented as a trauma, CT head and xray pelvis negative   · CXR with Right shoulder arthroplasty fracture dislocation  Multilevel mild thoracic compression fractures, indeterminate age    · Ortho consulted,  · Plan for likely right shoulder reduction, continue NPO status for now   · NWB RUE   · PRN pain management  · PT/OT consultations

## 2023-02-06 NOTE — ASSESSMENT & PLAN NOTE
· S/p mechanical fall at home, lost balance and fell to her right side, denies any LOC  · CXR with right shoulder dislocation as noted above   · Additional imaging negative thus far for additional traumatic injury   · History of severe rheumatoid arthritis and has very limited ambulation at baseline but uses walker to ambulate at times; lives at home with her sister; patient reports that she has home health nurses that come and visit her and patient reports that she would be very hesitant about going to a skilled nursing facility as she wants to remain at her house  · Obtain PT/OT consultations  · CM consultation for aide in outpatient resources   · Orthopedic evaluation as above   · Fall precautions while inpatient

## 2023-02-06 NOTE — DISCHARGE INSTR - OTHER ORDERS
Skin Care Plan:  1-Left Lower Extremity Blister: Cleanse area and pat dry  Apply small clover allevyn foam dressing to area  Rocael with T for Treatment and change every other day or PRN soilage or displacement  2-Turn/reposition q2h or when medically stable for pressure re-distribution on skin   3-Elevate heels to offload pressure  4-Moisturize skin daily with skin nourishing cream  5-Ehob cushion in chair when out of bed  6-Preventative Hydraguard to bilateral heels and sacro-buttocks BID and PRN

## 2023-02-06 NOTE — CONSULTS
Orthopedics   Geetha Rivera 65 y o  female MRN: [id number]  Unit/Bed#: [facility] -01      Chief Complaint:   Fall    HPI:  65 y o  right hand dominant female s/p fall  Consulted for R rTSA dislocation and fracture around humeral component stem seen on CXR  R rTSA was done by Dr Lizabeth Harada in 2020  Patient was ambulating at home last night with her walker when she reached for something on the counter and fell onto her R side  Denies R shoulder pain  Notes she falls very frequently, has noted insidious onset of R shoulder pain over the last couple months, in addition to her chronic L shoulder pain  Per her niece who cares for her, she has had a prominent deformity to her R shoulder for almost two years  Has a history of failed L rTSA with chronic dislocation, being treated non-operatively and not to undergo reduction attempts per Dr Lizabeth Harada  Today, she is complaining of b/l leg skin pain, she is being treated for cellulitis by her primary team  PMH significant for chronic osteomyelitis of her L foot, for which podiatry is consulted, rheumatoid arthritis on methotrexate, prednisone and hydroxychloroquine, and hypothyroidism  Review Of Systems:   · Skin: Normal  · Neuro: See HPI  · Musculoskeletal: See HPI  · 14 point review of systems negative except as stated above     Past Medical History:   Past Medical History:   Diagnosis Date   • Acute blood loss anemia 9/9/2020   • Aftercare following joint replacement 9/9/2020    Patient had right reversed total shoulder arthroplasty   Pain was controlled when he left the hospital      • Anxiety    • Arthritis    • Depression    • Disease of thyroid gland     HYPO   • Femur neck fracture ([facility])    • GERD (gastroesophageal reflux disease)    • Hyperthyroidism     RESOLVED: [id number]   • Osteoporosis    • Polio    • PVD (peripheral vascular disease) ([facility])    • Right BBB/left ant fasc block    • UTI (urinary tract infection)    • Varicella infection     LAST ASSESSED: [id number] Past Surgical History:   Past Surgical History:   Procedure Laterality Date   • APPENDECTOMY     • HIP ARTHROPLASTY Left 11/27/2017    Procedure: REMOVAL IM NAIL CONVERSION TO TOTAL HIP ARTHROPLASTY POSTERIOR APPROACH;  Surgeon: Alina Frausto MD;  [facility];  Service: Orthopedics   • HIP FRACTURE SURGERY     • HIP SURGERY      both hips replaced;2013 left ,2014 right   • JOINT REPLACEMENT Right     HIP TOTAL   • MD ARTHROPLASTY GLENOHUMERAL JOINT TOTAL SHOULDER Right 9/2/2020    Procedure: ARTHROPLASTY SHOULDER REVERSE;  Surgeon: Redd Herman MD;  [facility];  Service: Orthopedics   • MD ARTHROPLASTY GLENOHUMERAL JOINT TOTAL SHOULDER Left 6/1/2021    Procedure: ARTHROPLASTY SHOULDER REVERSE;  Surgeon: Redd Herman MD;  [facility];  Service: Orthopedics   • MD CONV PREV HIP TOT HIP ARTHRP W/WO AGRFT/ALGRFT Left 10/4/2017    Procedure: Rosey Eisenberg removal of left hip;  Surgeon: Alina Frausto MD;  [facility];  Service: Orthopedics   • MD [address] WRST SURG W/RLS TRANSVRS CARPL LIGM Right 5/24/2022    Procedure: RELEASE CARPAL TUNNEL ENDOSCOPIC-right;  Surgeon: Laurie Basurto MD;  [facility];  Service: Orthopedics   • REVISION TOTAL HIP ARTHROPLASTY Right    • TOE AMPUTATION Left 7/24/2022    Procedure: 5TH METATARSAL RESECTION WITH BOTTOM FLAP CLOSURE;  Surgeon: Dustin Rosario DPM;  Location[facility];  Service: Podiatry   • TONSILLECTOMY         Family History:  Family history reviewed and non-contributory  Family History   Problem Relation Age of Onset   • Rheum arthritis Mother    • Rheum arthritis Sister    • Prostate cancer Brother        Social History:  Social History     Socioeconomic History   • Marital status:      Spouse name: None   • Number of children: 1   • Years of education: None   • Highest education level: None   Occupational History   • Occupation: RETIRED    Tobacco Use   • Smoking status: Former   • Smokeless tobacco: Never   • Tobacco comments:     quit 20-30 years ago   Vaping Use   • Vaping Use: Never used   Substance and Sexual Activity   • Alcohol use: Not Currently   • Drug use: Not Currently   • Sexual activity: Not Currently   Other Topics Concern   • None   Social History Narrative    Always uses seat belt    Caffeine use    [employer]    Single as per all scripts      Social Determinants of Health     Financial Resource Strain: Not on file   Food Insecurity: No Food Insecurity   • Worried About Running Out of Food in the Last Year: Never true   • Ran Out of Food in the Last Year: Never true   Transportation Needs: No Transportation Needs   • Lack of Transportation (Medical): No   • Lack of Transportation (Non-Medical):  No   Physical Activity: Not on file   Stress: Not on file   Social Connections: Not on file   Intimate Partner Violence: Not on file   Housing Stability: Low Risk    • Unable to Pay for Housing in the Last Year: No   • Number of Places Lived in the Last Year: 1   • Unstable Housing in the Last Year: No       Allergies:   No Known Allergies        Labs:  0   Lab Value Date/Time    HCT 36 1 02/06/2023 0421    HCT 37 7 11/30/2022 1053    HCT 37 1 08/02/2022 1328    HCT 36 8 12/16/2014 0727    HCT 28 8 (L) 10/30/2014 0626    HCT 29 2 (L) 10/27/2014 0648    HGB 11 0 (L) 02/06/2023 0421    HGB 11 3 (L) 11/30/2022 1053    HGB 11 3 (L) 08/02/2022 1328    HGB 11 9 12/16/2014 0727    HGB 8 8 (L) 10/30/2014 0626    HGB 9 0 (L) 10/27/2014 0648    INR 0 84 12/26/2021 0752    INR 0 96 10/13/2014 0608    WBC 10 24 (H) 02/06/2023 0421    WBC 6 01 11/30/2022 1053    WBC 6 50 08/02/2022 1328    WBC 5 60 12/16/2014 0727    WBC 5 58 10/30/2014 0626    WBC 6 95 10/27/2014 0648    ESR 18 02/06/2023 0752    CRP 11 1 (H) 02/06/2023 0752       Meds:    Current Facility-Administered Medications:   •  acetaminophen (TYLENOL) tablet 650 mg, 650 mg, Oral, Q6H PRN, Breannadalia Wells, DO, 650 mg at 02/06/23 0948  •  ascorbic acid (VITAMIN C) tablet 375 mg, 375 mg, Oral, Daily, Trinh Erazo, DO, 375 mg at 02/06/23 1327  •  dextrose 5 % and sodium chloride 0 9 % infusion, 75 mL/hr, Intravenous, Continuous, Trnih Erazo DO, Last Rate: 75 mL/hr at 02/06/23 0648, 75 mL/hr at 02/06/23 0648  •  [START ON 2/7/2023] enoxaparin (LOVENOX) subcutaneous injection 40 mg, 40 mg, Subcutaneous, Q24H [facility], Martha Mcnally PA-C  •  ferrous gluconate (FERGON) tablet 324 mg, 324 mg, Oral, Daily, Trinh Erazo DO, 324 mg at 22/65/99 4491  •  folic acid (FOLVITE) tablet 2,000 mcg, 2,000 mcg, Oral, Daily, Trinh Erazo DO, 2,000 mcg at 02/06/23 1121  •  hydroxychloroquine (PLAQUENIL) tablet 100 mg, 100 mg, Oral, QPM, Trinh Erazo DO, 100 mg at 02/06/23 1723  •  hydroxychloroquine (PLAQUENIL) tablet 200 mg, 200 mg, Oral, QAM, Trinh Erazo, DO, 200 mg at 02/06/23 1122  •  levothyroxine tablet 50 mcg, 50 mcg, Oral, Early Morning, Trinh Erazo, DO  •  methocarbamol (ROBAXIN) tablet 750 mg, 750 mg, Oral, BID PRN, Trinh Erazo DO, 750 mg at 02/06/23 1131  •  [START ON 2/9/2023] methotrexate tablet 7 5 mg, 7 5 mg, Oral, Weekly, Trinh Erazo DO  •  nortriptyline (PAMELOR) capsule 100 mg, 100 mg, Oral, HS, Trinh Erazo, DO  •  oxyCODONE (ROXICODONE) IR tablet 5 mg, 5 mg, Oral, Q6H PRN, Trinh Erazo, DO, 5 mg at 02/06/23 1537  •  predniSONE tablet 5 mg, 5 mg, Oral, Daily, Trinh Erazo, DO, 5 mg at 02/06/23 1121  •  rOPINIRole (REQUIP) tablet 2 mg, 2 mg, Oral, HS, Trinh Erazo, DO  •  senna-docusate sodium (SENOKOT S) 8 6-50 mg per tablet 1 tablet, 1 tablet, Oral, BID, Trinh Erazo, , 1 tablet at 02/06/23 1723    Blood Culture:   Lab Results   Component Value Date    BLOODCX No Growth After 5 Days  12/02/2019    BLOODCX No Growth After 5 Days   12/02/2019       Wound Culture:   Lab Results   Component Value Date    WOUNDCULT 2+ Growth of Staphylococcus aureus (A) 07/21/2022    WOUNDCULT 1+ Growth of Pseudomonas aeruginosa (A) 07/21/2022    WOUNDCULT (A) 07/21/2022     1+ Growth of - Acinetobacter pittii  / Acinetobacter species       Ins and Outs:  No intake/output data recorded  Physical Exam:   BP (!) 176/94   Pulse 79   Temp 98 1 °F (36 7 °C)   Resp 12   SpO2 98%   Gen: No acute distress, resting comfortably in bed  HEENT: Eyes clear, moist mucus membranes, hearing intact  Respiratory: No audible wheezing or stridor  Cardiovascular: Well Perfused peripherally, 2+ distal pulse  Abdomen: nondistended, no peritoneal signs  Musculoskeletal: right upper extremity  · Skin pink, dry, and intact  · Obvious deformity about R shoulder, prominence anteriorly  · ROM not assessed 2/2 known dislocation  · Sensation intact to axillary, musculocutaneous, radial, ulna, median nerves  Sensation intact over the deltoid  · Motor intact to musculocutaneous, radial, ulnar, and median nerves  · 2+ radial and ulnar pulse    Radiology:   I personally reviewed the films  X-rays AP/Lateral and axillary views of right shoulder shows rTSA with anteriorly displaced humeral component     _*_*_*_*_*_*_*_*_*_*_*_*_*_*_*_*_*_*_*_*_*_*_*_*_*_*_*_*_*_*_*_*_*_*_*_*_*_*_*_*_*    Procedure- Orthopedics   Geetha Colon 65 y o  female MRN: [id number]  Unit/Bed#: [facility] 760-01    Procedure note: Shoulder reduction    After informed consent was obtained and a time out was performed  Pts shoulder was reduced using a gentle traction/countertraction maneuver  Post reduction there were no blocks to motion or instability of the glenohumeral joint  Pt was placed in a sling  Post reduction x rays are ordered and will be reviewed  Pt tolerated the procedure well and was neurovascularly intact pre and post procedure        _*_*_*_*_*_*_*_*_*_*_*_*_*_*_*_*_*_*_*_*_*_*_*_*_*_*_*_*_*_*_*_*_*_*_*_*_*_*_*_*_*    Assessment:  65 y o  female with right shoulder rTSA dislocation s/p reduction  Will obtain CT scan  No indication for acute orthopaedic surgery operative intervention at this time       Plan: · NWB right upper extremity in sling  · Analgesics for pain  · Will f/u CT scan  · Will discuss plan with rounding team in the AM  · Dispo: Ortho will follow      Chas Nye MD

## 2023-02-06 NOTE — ASSESSMENT & PLAN NOTE
-History of rheumatoid arthritis  -On PTA methotrexate 7 5 mg once weekly on Thursday, prednisone 5 mg daily and hydroxychloroquine  Plan  > Continue PTA medications

## 2023-02-06 NOTE — PROGRESS NOTES
[facility]  Progress Note Gordon Lackey Colon 1945, 65 y o  female MRN: [id number]  Unit/Bed#: [facility]-01 Encounter: [id number]  Primary Care Provider: Reji Monge MD   Date and time admitted to hospital: 2/6/2023  3:33 AM      DOS: 2/6/2023  POST ADMIT CHECK   * Shoulder dislocation  Assessment & Plan  · Pt presented s/p mechanical fall onto her right side at home, lives with her sister  Does have home health aides that come in at baseline   · Presented as a trauma, CT head and xray pelvis negative   · CXR with Right shoulder arthroplasty fracture dislocation  Multilevel mild thoracic compression fractures, indeterminate age    · Ortho consulted,  · Plan for likely right shoulder reduction, continue NPO status for now   · NWB RUE   · PRN pain management  · PT/OT consultations     Ambulatory dysfunction  Assessment & Plan  · S/p mechanical fall at home, lost balance and fell to her right side, denies any LOC  · CXR with right shoulder dislocation as noted above   · Additional imaging negative thus far for additional traumatic injury   · History of severe rheumatoid arthritis and has very limited ambulation at baseline but uses walker to ambulate at times; lives at home with her sister; patient reports that she has home health nurses that come and visit her and patient reports that she would be very hesitant about going to a skilled nursing facility as she wants to remain at her house  · Obtain PT/OT consultations  · CM consultation for aide in outpatient resources   · Orthopedic evaluation as above   · Fall precautions while inpatient     Dysphagia  Assessment & Plan  · Noted by nursing staff, therefore speech consulted  · Recommending pureed diet with thin liquids once no longer NPO pending orthopedic intervention   · Video barium swallow   · Monitor closely, aspiration precautions    Leg pain  Assessment & Plan  · Endorses severe left foot pain on the plantar aspect; does have a history of osteomyelitis of the left foot  · Bilateral lower extremities notable for erythema of the lower extremities and warmth  · Afebrile, mild leukocytosis, continue to trend CBC, could be in setting of daily prednisone use   · Immunocompromised secondary to patient's rheumatoid arthritis  · Sed rate normal, CRP 11 1  · Podiatry evaluated, no evidence of underlying infection at this time  · Will d/c IV vancomycin and monitor   · Could be secondary to patient's underlying RA  · Local wound care    Hyponatremia  Assessment & Plan  · Sodium 134 today   · Appears to be chronic on review of records   · Currently on IV D5 NS at 75 cc/hr, continue for now as patient NPO  · Continue to trend BMP    Rheumatoid arthritis involving multiple sites with positive rheumatoid factor (HCC)  Assessment & Plan  · History of rheumatoid arthritis  · On PTA methotrexate 7 5 mg once weekly on Thursday, prednisone 5 mg daily and hydroxychloroquine, continue for now    Hypothyroidism due to Hashimoto's thyroiditis  Assessment & Plan  · Continue PTA levothyroxine 50 mcg daily     VTE Pharmacologic Prophylaxis: VTE Score: 3 Moderate Risk (Score 3-4) - Pharmacological DVT Prophylaxis Ordered: enoxaparin (Lovenox)  Patient Centered Rounds: I evaluated the patient without nursing staff present due to speaking to nurse outside patient's room   Discussions with Specialists or Other Care Team Provider: Discussed with ortho, RN, CM and reviewed podiatry notes     Education and Discussions with Family / Patient: Patient declined call to   Time Spent for Care: 30 minutes  More than 50% of total time spent on counseling and coordination of care as described above      Current Length of Stay: 0 day(s)  Current Patient Status: Inpatient   Certification Statement: The patient will continue to require additional inpatient hospital stay due to orthopedic intervention for shoulder dislocation, PT/OT/CM consultation, video barium swallow   Discharge Plan: Anticipate discharge in 48-72 hrs to discharge location to be determined pending rehab evaluations  Code Status: Level 3 - DNAR and DNI    Subjective:   Pt reports that she feels okay today, not having any significant pain in her shoulder  Reports she does not want anyone to touch her shoulder  Denies any chest pain, shortness of breath, nausea or vomiting  Reports that her dysphagia symptoms have been ongoing for the last several months  Objective:     Vitals:   Temp (24hrs), Av 9 °F (36 6 °C), Min:97 7 °F (36 5 °C), Max:98 °F (36 7 °C)    Temp:  [97 7 °F (36 5 °C)-98 °F (36 7 °C)] 98 °F (36 7 °C)  HR:  [68-75] 70  Resp:  [12-18] 12  BP: (116-165)/(56-88) 161/88  SpO2:  [96 %-100 %] 98 %  There is no height or weight on file to calculate BMI  Input and Output Summary (last 24 hours):   No intake or output data in the 24 hours ending 23 1445    Physical Exam:   Physical Exam  Vitals reviewed  Constitutional:       General: She is not in acute distress  Appearance: She is not toxic-appearing  Comments: Pt is in no acute distress lying in her hospital bed resting comfortably  Right shoulder with deformity    HENT:      Head: Normocephalic and atraumatic  Cardiovascular:      Rate and Rhythm: Normal rate and regular rhythm  Pulmonary:      Effort: No respiratory distress  Breath sounds: Normal breath sounds  No wheezing  Abdominal:      General: There is no distension  Palpations: Abdomen is soft  Tenderness: There is no abdominal tenderness  Skin:     General: Skin is warm and dry  Neurological:      Mental Status: She is alert            Additional Data:     Labs:  Results from last 7 days   Lab Units 23  0421   WBC Thousand/uL 10 24*   HEMOGLOBIN g/dL 11 0*   HEMATOCRIT % 36 1   PLATELETS Thousands/uL 262   LYMPHO PCT % 4*   MONO PCT % 2*   EOS PCT % 0     Results from last 7 days   Lab Units 02/06/23  0421   SODIUM mmol/L 134*   POTASSIUM mmol/L 4 1   CHLORIDE mmol/L 108   CO2 mmol/L 22   BUN mg/dL 23   CREATININE mg/dL 0 52*   ANION GAP mmol/L 4   CALCIUM mg/dL 9 0   GLUCOSE RANDOM mg/dL 98         Results from last 7 days   Lab Units 02/06/23  1106 02/06/23  0834   POC GLUCOSE mg/dl 113 99               Lines/Drains:  Invasive Devices     Peripheral Intravenous Line  Duration           Peripheral IV 02/06/23 Right;Ventral (anterior) Forearm <1 day                      Imaging: Reviewed radiology reports from this admission including: CT head and xray(s)    Recent Cultures (last 7 days):         Last 24 Hours Medication List:   Current Facility-Administered Medications   Medication Dose Route Frequency Provider Last Rate   • acetaminophen  650 mg Oral Q6H PRN Earnestine Hsu, DO     • Ascorbic Acid (Vitamin C)  375 mg Oral Daily Earnestine Hsu, DO     • dextrose 5 % and sodium chloride 0 9 %  75 mL/hr Intravenous Continuous Earnestine Aracelis, DO 75 mL/hr (02/06/23 0648)   • [START ON 2/7/2023] enoxaparin  40 mg Subcutaneous Q24H [address] [place] PA-C     • ferrous gluconate  324 mg Oral Daily Earnestine Hsu, DO     • folic acid  5,775 mcg Oral Daily Earnestine Hsu, DO     • hydroxychloroquine  100 mg Oral QPM Earnestine Hsu, DO     • hydroxychloroquine  200 mg Oral QAM Earnestine Hsu, DO     • levothyroxine  50 mcg Oral Early Morning Earnestine Hsu, DO     • methocarbamol  750 mg Oral BID PRN Earnestine Hsu, DO     • [START ON 2/9/2023] methotrexate  7 5 mg Oral Weekly Earnestine Hsu, DO     • nortriptyline  100 mg Oral HS Earnestine Hsu, DO     • oxyCODONE  5 mg Oral Q6H PRN Earnestine Hsu, DO     • predniSONE  5 mg Oral Daily Earnestine Hsu, DO     • rOPINIRole  2 mg Oral HS Earnestine Aracelis, DO     • senna-docusate sodium  1 tablet Oral BID Earnestine Hsu, DO          Today, Patient Was Seen By: Marty Williamson PA-C    **Please Note: This note may have been constructed using a voice recognition system  **

## 2023-02-06 NOTE — ASSESSMENT & PLAN NOTE
-Mechanical fall last night while patient was using her walker to get up and try and grab an NSAID from the counter; she reports losing her balance and falling onto her right side  Denies any chest pain or shortness of breath or lightheadedness prior  Denies any loss of consciousness  -CT head without acute intracranial abnormality  -Chest x-ray with no acute cardiopulmonary findings  Right shoulder arthroplasty fracture or dislocation  Multilevel mild thoracic compression fractures, indeterminate age  -Right hip film without acute osseous abnormality  -Endorses severe left foot pain as well as concern for bilateral lower extremity cellulitis and patient does have a known history of osteomyelitis of the left foot  -History of severe rheumatoid arthritis and has very limited ambulation at baseline but uses walker to ambulate at times; lives at home with her sister; patient reports that she has home health nurses that come and visit her and patient reports that she would be very hesitant about going to a skilled nursing facility as she wants to remain at her house currently  Plan  > Plan as in leg pain and shoulder dislocation sections  > PT/OT  Fall precautions  > Case management consultation

## 2023-02-06 NOTE — ASSESSMENT & PLAN NOTE
-Endorses severe left foot pain on the plantar aspect; does have a history of osteomyelitis of the left foot  -Bilateral lower extremities notable for erythema of the lower extremities and warmth  -Afebrile, no leukocytosis  -Immunocompromised secondary to patient's rheumatoid arthritis  Plan  > Will start IV vancomycin for cellulitis as well as concern for underlying osteomyelitis specifically of the left foot  We will lower plain films of the left and right feet  Order ESR/CRP  Consult Podiatry as well as wound care teams

## 2023-02-06 NOTE — WOUND OSTOMY CARE
Consult Note - Wound   Geetha Colon 65 y o  female MRN: [id number]  Unit/Bed#: -01 Encounter: [id number]        History and Present Illness:  Patient is a 64 yo female that was admitted to [facility] for treatment of shoulder dislocation  Family present at bedside  Patient has a PMH of osteomyelitis of the left foot, ambulatory dysfunction, rheumatoid with positive RA, hyponatremia, anxiety, restless leg syndrome, chronic pain syndrome and callus  Podiatry on board  Patient is a min assist for turning and repositioning  Patient reports continence of bowel and bladder  On assessment, patient is lying supine on regular mattress    Wound Care was consulted for left foot wounds  Assessment Findings:   B/L heels are dry intact and lottie with no skin loss or wounds present  Recommend preventative Hydraguard Cream and proper offloading/ repositioning  Patient with intact left foot callous- no skin loss or drainage noted    B/L sacro-buttocks is dry, intact, pink in color and blanches  No skin loss or wounds present  Recommend preventative hydragaurd to area  1  Left Lateral LE Blister: Patient with intact filled blister  Area is pink epithelial tissue and vaishali-wound is intact  No skin loss or drainage noted  An Allevyn foam dressing was applied to area  No induration, fluctuance, odor, warmth/temperature differences, redness, or purulence noted to the above noted wounds and skin areas assessed  New dressings applied per orders listed below  Patient tolerated well- no s/s of non-verbal pain or discomfort observed during the encounter  Bedside nurse aware of plan of care  See flow sheets for more detailed assessment findings  Orders listed below and wound care will sign off, call or tiger text with questions  Skin Care Plan:  1-Left Lower Extremity Blister: Cleanse area and pat dry  Apply small clover allevyn foam dressing to area   Rocael with T for Treatment and change every other day or PRN soilage or displacement  2-Turn/reposition q2h or when medically stable for pressure re-distribution on skin   3-Elevate heels to offload pressure  4-Moisturize skin daily with skin nourishing cream  5-Ehob cushion in chair when out of bed  6-Preventative Hydraguard to bilateral heels and sacro-buttocks BID and PRN  Wounds:  Wound 07/21/22 Venous Ulcer Leg Left; Lower (Active)   Wound Image   02/06/23 1035   Wound Description Epithelialization;Fragile 02/06/23 1035   Tiffani-wound Assessment Clean;Dry; Intact 02/06/23 1035   Wound Length (cm) 2 cm 02/06/23 1035   Wound Width (cm) 1 cm 02/06/23 1035   Wound Depth (cm) 0 cm 02/06/23 1035   Wound Surface Area (cm^2) 2 cm^2 02/06/23 1035   Wound Volume (cm^3) 0 cm^3 02/06/23 1035   Calculated Wound Volume (cm^3) 0 cm^3 02/06/23 1035   Change in Wound Size % 100 02/06/23 1035   Tunneling 0 cm 02/06/23 1035   Tunneling in depth located at 0 02/06/23 1035   Undermining 0 02/06/23 1035   Undermining is depth extending from 0 02/06/23 1035   Wound Site Closure KAROLINA 02/06/23 1035   Drainage Amount None 02/06/23 1035   Non-staged Wound Description Not applicable 54/[birth year] 1968   Treatments Cleansed 02/06/23 1035   Dressing Protective barrier 02/06/23 1035   Wound packed? No 02/06/23 1035   Packing- # removed 0 02/06/23 1035   Packing- # inserted 0 02/06/23 1035   Dressing Changed New 02/06/23 1035   Patient Tolerance Tolerated well 02/06/23 1035   Dressing Status Clean;Dry; Intact 02/06/23 4355 Rachid Brewster RN, BSN

## 2023-02-06 NOTE — ASSESSMENT & PLAN NOTE
· History of rheumatoid arthritis  · On PTA methotrexate 7 5 mg once weekly on Thursday, prednisone 5 mg daily and hydroxychloroquine, continue for now

## 2023-02-06 NOTE — H&P
[address] [facility]  H&PWerin Bowman Colon 1945, 65 y o  female MRN: [id number]  Unit/Bed#: ED 17 Encounter: [id number]  Primary Care Provider: Trace Collier MD   Date and time admitted to hospital: 2/6/2023  3:33 AM    * Shoulder dislocation  Assessment & Plan  -Mechanical fall last night onto her right side  -Chest x-ray: No acute cardiopulmonary findings  Right shoulder arthroplasty fracture dislocation  Multilevel mild thoracic compression fractures, indeterminate age   -History of rheumatoid arthritis it appears that patient has previously had right shoulder dislocated  Plan  >Admit to medicine  Keep n p o  except for meds overnight  Consult Orthopedics team   Pain control  IV fluids while NPO  If no need for surgical procedure per Ortho, will need diet given  Leg pain  Assessment & Plan  -Endorses severe left foot pain on the plantar aspect; does have a history of osteomyelitis of the left foot  -Bilateral lower extremities notable for erythema of the lower extremities and warmth  -Afebrile, no leukocytosis  -Immunocompromised secondary to patient's rheumatoid arthritis  Plan  > Will start IV vancomycin for cellulitis as well as concern for underlying osteomyelitis specifically of the left foot  We will lower plain films of the left and right feet  Order ESR/CRP  Consult Podiatry as well as wound care teams         Osteomyelitis of left foot (HCC)  Assessment & Plan  - History of osteomyelitis of the left foot  Plan  > As above in  leg pain section    Rheumatoid arthritis involving multiple sites with positive rheumatoid factor (HCC)  Assessment & Plan  -History of rheumatoid arthritis  -On PTA methotrexate 7 5 mg once weekly on Thursday, prednisone 5 mg daily and hydroxychloroquine  Plan  > Continue PTA medications    Ambulatory dysfunction  Assessment & Plan  -Mechanical fall last night while patient was using her walker to get up and try and grab an NSAID from the counter; she reports losing her balance and falling onto her right side  Denies any chest pain or shortness of breath or lightheadedness prior  Denies any loss of consciousness  -CT head without acute intracranial abnormality  -Chest x-ray with no acute cardiopulmonary findings  Right shoulder arthroplasty fracture or dislocation  Multilevel mild thoracic compression fractures, indeterminate age  -Right hip film without acute osseous abnormality  -Endorses severe left foot pain as well as concern for bilateral lower extremity cellulitis and patient does have a known history of osteomyelitis of the left foot  -History of severe rheumatoid arthritis and has very limited ambulation at baseline but uses walker to ambulate at times; lives at home with her sister; patient reports that she has home health nurses that come and visit her and patient reports that she would be very hesitant about going to a skilled nursing facility as she wants to remain at her house currently  Plan  > Plan as in leg pain and shoulder dislocation sections  > PT/OT  Fall precautions  > Case management consultation  Hypothyroidism due to Hashimoto's thyroiditis  Assessment & Plan  - Continue PTA levothyroxine          VTE Prophylaxis:  sequential compression device   Code Status: Level 3 - DNAR and DNI discussed with patient in her ER room      Anticipated Length of Stay:  Patient will be admitted on an Inpatient basis with an anticipated length of stay of  > 2 midnights  Justification for Hospital Stay: Please see detailed plans noted above      Chief Complaint:     Mechanical fall, ambulatory dysfunction, right shoulder dislocation, foot pain, concern for infection of the lower extremity  History of Present Illness:  Kg Kearns is a 65 y o  female who has past medical history significant for severe rheumatoid arthritis on PTA methotrexate/hydroxychloroquine/prednisone, previous shoulder dislocation, osteomyelitis of the left foot who presented to [facility] ER in the early morning hours of 2/6 via EMS after she got out of bed to get pain relief medication last night and lost her balance falling onto her right side  Patient reports that she uses a walker to ambulate as she has severe rheumatoid arthritis and that she was trying to grab an NSAID pain relief medication when she lost her balance  Patient reports that she has a life assist button that she pressed after she fell to notify EMS  She reports that she lives with her sister at home  She reports that she has nursing assistance that comes to her house to help her  Patient denies any chest pain, shortness of breath, lightheadedness prior to the fall  Patient denies any loss of consciousness  In the ER patient complaining of right shoulder pain as well as left foot pain  Patient denies any fevers or chills  Review of Systems:    Constitutional:  Denies fever or chills   Eyes:  Denies change in visual acuity   HENT:  Denies nasal congestion or sore throat   Respiratory:  Denies cough or shortness of breath   Cardiovascular:  Denies chest pain or edema   GI:  Denies abdominal pain or bloody stools  :  Denies dysuria   Musculoskeletal: Positive for right shoulder pain, positive for left foot pain  Integument: Erythema of the bilateral lower extremities  Neurologic:  Denies headache or sensory changes   Endocrine:  Denies polyuria or polydipsia   Lymphatic:  Denies swollen glands   Psychiatric:  Denies depression or anxiety     Past Medical and Surgical History:   Past Medical History:   Diagnosis Date   • Acute blood loss anemia 9/9/2020   • Aftercare following joint replacement 9/9/2020    Patient had right reversed total shoulder arthroplasty   Pain was controlled when he left the hospital      • Anxiety    • Arthritis    • Depression    • Disease of thyroid gland     HYPO   • Femur neck fracture (HCC)    • GERD (gastroesophageal reflux disease)    • Hyperthyroidism     RESOLVED: [id number]   • Osteoporosis    • Polio    • PVD (peripheral vascular disease) ([facility] [address] )    • Right BBB/left ant fasc block    • UTI (urinary tract infection)    • Varicella infection     LAST ASSESSED: [id number]     Past Surgical History:   Procedure Laterality Date   • APPENDECTOMY     • HIP ARTHROPLASTY Left 11/27/2017    Procedure: REMOVAL IM NAIL CONVERSION TO TOTAL HIP ARTHROPLASTY POSTERIOR APPROACH;  Surgeon: Dar Whipple MD;  [facility];  Service: Orthopedics   • HIP FRACTURE SURGERY     • HIP SURGERY      both hips replaced;2013 left ,2014 right   • JOINT REPLACEMENT Right     HIP TOTAL   • [place] ARTHROPLASTY GLENOHUMERAL JOINT TOTAL SHOULDER Right 9/2/2020    Procedure: ARTHROPLASTY SHOULDER REVERSE;  Surgeon: Mary Gan MD;  [facility];  Service: Orthopedics   • [place] ARTHROPLASTY GLENOHUMERAL JOINT TOTAL SHOULDER Left 6/1/2021    Procedure: ARTHROPLASTY SHOULDER REVERSE;  Surgeon: Mary Gan MD;  [facility];  Service: Orthopedics   • [place] CONV PREV HIP TOT HIP ARTHRP W/WO AGRFT/ALGRFT Left 10/4/2017    Procedure: Hareware removal of left hip;  Surgeon: Dar Whipple MD;  [facility];  Service: Orthopedics   •  [address] SURG W/RLS TRANSVRS CARPL LIGM Right 5/24/2022    Procedure: RELEASE CARPAL TUNNEL ENDOSCOPIC-right;  Surgeon: Flaquito Juan MD;  [facility];  Service: Orthopedics   • REVISION TOTAL HIP ARTHROPLASTY Right    • TOE AMPUTATION Left 7/24/2022    Procedure: 5TH METATARSAL RESECTION WITH BOTTOM FLAP CLOSURE;  Surgeon: Rafaela Ng DPM;  [facility];  Service: Podiatry   • TONSILLECTOMY         Meds/Allergies:  No current facility-administered medications on file prior to encounter       Current Outpatient Medications on File Prior to Encounter   Medication Sig Dispense Refill   • acetaminophen (TYLENOL) 650 mg CR tablet Take 1 tablet (650 mg total) by mouth every 8 (eight) hours as needed for mild pain 30 tablet 0   • Ascorbic Acid, Vitamin C, (VITAMIN C) 100 MG tablet Take 400 mg by mouth daily     • ferrous gluconate (FERGON) 324 mg tablet Take 1 tablet (324 mg total) by mouth in the morning 90 tablet 0   • folic acid (FOLVITE) 1 mg tablet Take 2,000 mcg by mouth daily     • hydroxychloroquine (PLAQUENIL) 200 mg tablet 1 pill am 1/2 pill pm     • levothyroxine 50 mcg tablet Take 1 tablet (50 mcg total) by mouth daily in the early morning 90 tablet 1   • MELATONIN PO Take by mouth     • meloxicam (Mobic) 7 5 mg tablet Take 1 tablet (7 5 mg total) by mouth daily 30 tablet 0   • methocarbamol (ROBAXIN) 750 mg tablet Take 1 tablet (750 mg total) by mouth 2 (two) times a day as needed for muscle spasms 30 tablet 5   • methotrexate 2 5 MG tablet 3 tablets by mouth once a week on Thursday     • Multiple Vitamins-Minerals (CENTRUM SILVER PO) Take 1 tablet by mouth daily     • nortriptyline (PAMELOR) 50 mg capsule Take 2 capsules (100 mg total) by mouth daily at bedtime 180 capsule 1   • OXYCODONE HCL PO Take by mouth     • predniSONE 5 mg tablet Take 5 mg by mouth daily     • rOPINIRole (REQUIP) 2 mg tablet Take 1 tablet (2 mg total) by mouth daily at bedtime 90 tablet 0   • senna-docusate sodium (SENOKOT S) 8 6-50 mg per tablet Take 1 tablet by mouth 2 (two) times a day 20 tablet 0     Allergies: No Known Allergies    History:  Marital Status:      Substance Use History:   Social History     Substance and Sexual Activity   Alcohol Use Not Currently     Social History     Tobacco Use   Smoking Status Former   Smokeless Tobacco Never   Tobacco Comments    quit 20-30 years ago     Social History     Substance and Sexual Activity   Drug Use Not Currently       Family History:  Family History   Problem Relation Age of Onset   • Rheum arthritis Mother    • Rheum arthritis Sister    • Prostate cancer Brother        Physical Exam:     Vitals:   Blood Pressure: 126/60 (02/06/23 0600)  Pulse: 72 (02/06/23 0600)  Temperature: 97 7 °F (36 5 °C) (02/06/23 0346)  Temp Source: Oral (02/06/23 0346)  Respirations: 17 (02/06/23 0600)  SpO2: 98 % (02/06/23 0600)    Constitutional: Alert and Oriented x4, Appears chronically ill at baseline  Eyes:  EOMI, No scleral icterus   HENT:   oropharynx moist, external ears normal, external nose normal   Respiratory:  No respiratory distress, no wheezing   Cardiovascular:  Normal rate, no murmurs   GI:  Soft, nondistended, no guarding   :  No costovertebral angle tenderness   Musculoskeletal: Right shoulder with obvious deformity, denies hip tenderness to palpation bilaterally, plantar aspect of left foot severely tender to palpation, streaks of erythema of the bilateral lower extremities from the feet up to the mid lower shins, bony changes consistent with rheumatoid arthritis  Integument:  no jaundice  Neurologic:  Alert &awake, communicative, CN 2-12 normal,  no focal deficits noted   Psychiatric:  Speech and behavior appropriate       Lab Results: I have personally reviewed pertinent reports  Results from last 7 days   Lab Units 02/06/23  0421   WBC Thousand/uL 10 24*   HEMOGLOBIN g/dL 11 0*   HEMATOCRIT % 36 1   PLATELETS Thousands/uL 262   LYMPHO PCT % 4*   MONO PCT % 2*   EOS PCT % 0     Results from last 7 days   Lab Units 02/06/23  0421   POTASSIUM mmol/L 4 1   CHLORIDE mmol/L 108   CO2 mmol/L 22   BUN mg/dL 23   CREATININE mg/dL 0 52*   CALCIUM mg/dL 9 0             Imaging: I have personally reviewed pertinent reports  XR chest 1 view portable    Result Date: 2/6/2023  Narrative: CHEST INDICATION:   fall  COMPARISON:  12/26/2021 EXAM PERFORMED/VIEWS:  XR CHEST PORTABLE FINDINGS:  Lung volumes are within normal limits  Markedly rotated  Lungs are clear  No effusion or pneumothorax  Heart, mediastinal and hilar structures are within normal limits for technique  Bilateral reverse shoulder arthroplasties  Dislocated on the right    Triangular-shaped bony fragments compatible with fracture, grossly from the inferior glenoid  Multilevel rib deformities appear chronic  No acute displaced fracture  Evidence of multilevel mild thoracic compression fractures  No suspicious bone lesions  Soft tissues are within normal limits  The study was marked in [facility] for immediate notification  Impression: No acute cardiopulmonary findings  Right shoulder arthroplasty fracture dislocation  Multilevel mild thoracic compression fractures, indeterminate age  Workstation performed: [id number]     XR hip/pelv 2-3 vws right if performed    Result Date: 2/6/2023  Narrative: RIGHT HIP INDICATION:   [place]  COMPARISON:  11/13/2018 pelvis radiograph and 11/14/2018 lumbar radiograph VIEWS:  XR HIP/PELV 2-3 VWS RIGHT W PELVIS IF PERFORMED FINDINGS: Bilateral hip arthroplasties, unipolar on the right and bipolar on the left  No evidence of hardware failure  Chronic L3 compression fracture  Probable old left parasymphyseal fracture  No acute fracture  Alignment is preserved  No suspicious lytic or blastic bone lesion  Diffuse osteopenia  Soft tissues are within normal limits  Pessary and surgical changes  Impression: No acute osseous abnormality  Workstation performed: [id number]     XR knee 4+ views left injury    Result Date: 2/3/2023  Narrative: LEFT KNEE INDICATION:   fall, pain  COMPARISON:  11/30/2019 VIEWS:  XR KNEE 4+ VW LEFT INJURY FINDINGS: There is no acute fracture or dislocation  There is no joint effusion  No significant degenerative changes  No lytic or blastic osseous lesion  Soft tissues are unremarkable  Impression: No acute osseous abnormality  Workstation performed: [id number]     XR knee 4+ views Right injury    Result Date: 2/3/2023  Narrative: RIGHT KNEE INDICATION:   Fall, pain  COMPARISON:  None VIEWS:  XR KNEE 4+ VW RIGHT INJURY FINDINGS: There is no acute fracture or dislocation  There is no joint effusion  No significant degenerative changes   No lytic or blastic osseous lesion  Soft tissues are unremarkable  Impression: No acute osseous abnormality  Workstation performed: [id number]     CT head without contrast    Addendum Date: 2/6/2023 Addendum:   ADDENDUM: Please note that on localizer images, there are bilateral shoulder arthroplasties  The right shoulder arthroplasty is shown to be dislocated on subsequent chest radiograph  This is not readily apparent on the localizer image, only obtained in the sagittal plane    Result Date: 2/6/2023  Narrative: CT BRAIN - WITHOUT CONTRAST INDICATION:   fall  COMPARISON:  12/13/2022 TECHNIQUE:  CT examination of the brain was performed  In addition to axial images, sagittal and coronal 2D reformatted images were created and submitted for interpretation  Radiation dose length product (DLP) for this visit:  886 35 mGy-cm   This examination, like all CT scans performed in the [facility], was performed utilizing techniques to minimize radiation dose exposure, including the use of iterative  reconstruction and automated exposure control  IMAGE QUALITY:  Diagnostic  FINDINGS: PARENCHYMA: No hemorrhage, extra-axial fluid collection, mass effect or midline shift  Gray-white matter differentiation preserved  Nonspecific, similar patchy periventricular and subcortical white matter lucencies most commonly related to changes of microangiopathy  Vascular calcification  VENTRICLES AND EXTRA-AXIAL SPACES:  Within normal limits for patient age  VISUALIZED ORBITS AND PARANASAL SINUSES: Mild strabismus  No acute orbital pathology  Sinus is well aerated  CALVARIUM AND EXTRACRANIAL SOFT TISSUES:  Within normal limits  Mastoid air cells are well aerated  Impression: No acute intracranial abnormality  Nonemergent findings above  Workstation performed: [id number]       Total time for visit, including counseling/coordination of care: 45 minutes   Greater than 50% of this total time spent on direct patient counseling and coorination of care  Epic Records Reviewed as well as Records in Care Everywhere    ** Please Note: Dragon 360 Dictation voice to text software was used in the creation of this document   **

## 2023-02-06 NOTE — ASSESSMENT & PLAN NOTE
· Sodium 134 today   · Appears to be chronic on review of records   · Currently on IV D5 NS at 75 cc/hr, continue for now as patient NPO  · Continue to trend BMP

## 2023-02-06 NOTE — SPEECH THERAPY NOTE
Speech-Language Pathology Bedside Swallow Evaluation    Patient Name: Cleopatra Landaverde    Today's Date: 2/6/2023     Problem List  Principal Problem:    Shoulder dislocation  Active Problems:    Hypothyroidism due to Hashimoto's thyroiditis    Ambulatory dysfunction    Rheumatoid arthritis involving multiple sites with positive rheumatoid factor ([facility] [address] )    Osteomyelitis of left foot (HCC)    Leg pain    Past Medical History  Past Medical History:   Diagnosis Date   • Acute blood loss anemia 9/9/2020   • Aftercare following joint replacement 9/9/2020    Patient had right reversed total shoulder arthroplasty   Pain was controlled when he left the hospital      • Anxiety    • Arthritis    • Depression    • Disease of thyroid gland     HYPO   • Femur neck fracture (HCC)    • GERD (gastroesophageal reflux disease)    • Hyperthyroidism     RESOLVED: [id number]   • Osteoporosis    • Polio    • PVD (peripheral vascular disease) ([facility] [address] )    • Right BBB/left ant fasc block    • UTI (urinary tract infection)    • Varicella infection     LAST ASSESSED: [id number]     Past Surgical History  Past Surgical History:   Procedure Laterality Date   • APPENDECTOMY     • HIP ARTHROPLASTY Left 11/27/2017    Procedure: REMOVAL IM NAIL CONVERSION TO TOTAL HIP ARTHROPLASTY POSTERIOR APPROACH;  Surgeon: Dar Whipple MD;  [facility];  Service: Orthopedics   • HIP FRACTURE SURGERY     • HIP SURGERY      both hips replaced;2013 left ,2014 right   • JOINT REPLACEMENT Right     HIP TOTAL   • [place] ARTHROPLASTY GLENOHUMERAL JOINT TOTAL SHOULDER Right 9/2/2020    Procedure: ARTHROPLASTY SHOULDER REVERSE;  Surgeon: Mary Gan MD;  [facility];  Service: Orthopedics   • [place] ARTHROPLASTY GLENOHUMERAL JOINT TOTAL SHOULDER Left 6/1/2021    Procedure: ARTHROPLASTY SHOULDER REVERSE;  Surgeon: Mary Gan MD;  [facility];  Service: Orthopedics   • [place] CONV PREV HIP TOT HIP ARTHRP W/WO AGRFT/ALGRFT Left 10/4/2017    Procedure: Hareware removal of left hip;  Surgeon: Juliano Cali MD;  [facility];  Service: Orthopedics   • [place] [address] SURG W/RLS TRANSVRS CARPL LIGM Right 5/24/2022    Procedure: RELEASE CARPAL TUNNEL ENDOSCOPIC-right;  Surgeon: Luisito Torre MD;  [facility];  Service: Orthopedics   • REVISION TOTAL HIP ARTHROPLASTY Right    • TOE AMPUTATION Left 7/24/2022    Procedure: 5TH METATARSAL RESECTION WITH BOTTOM FLAP CLOSURE;  Surgeon: Ivelisse Villegas DPM;  [facility]: [facility];  Service: Podiatry   • TONSILLECTOMY       Summary  Pt presented with s/s suggestive of WFL oral and suspected mild pharyngeal dysphagia  Symptoms or concerns include suspected pharyngeal swallow delay and audible swallows  Pt reports solid foods (I e chicken) feeling stuck on the R side of her throat  Adequate labial seal and oral control with straw sips of thin liquids  With thin and x2 spoonfuls of puree (apple sauce), there was no significant oral residual  There were no overt s/s aspiration today  Plan for MBS when able to further assess complaints of globus sensation with solid foods       Risk/s for Aspiration: suspect mild     Recommended Diet: puree/level 1 diet and thin liquids   Recommended Form of Meds: whole with liquid, crush if needed   Aspiration precautions and swallowing strategies: upright posture, only feed when fully alert, slow rate of feeding, small bites/sips and alternating bites and sips  Other Recommendations: Continue frequent oral care, 1:1 feed    Current Medical Status per internal medicine 2/6/23  Theresa Raman is a 65 y o  female who has past medical history significant for severe rheumatoid arthritis on PTA methotrexate/hydroxychloroquine/prednisone, previous shoulder dislocation, osteomyelitis of the left foot who presented to [facility] ER in the early morning hours of 2/6 via EMS after she got out of bed to get pain relief medication last night and lost her balance falling onto her right side  Patient reports that she uses a walker to ambulate as she has severe rheumatoid arthritis and that she was trying to grab an NSAID pain relief medication when she lost her balance  Patient reports that she has a life assist button that she pressed after she fell to notify EMS  She reports that she lives with her sister at home  She reports that she has nursing assistance that comes to her house to help her  Patient denies any chest pain, shortness of breath, lightheadedness prior to the fall  Patient denies any loss of consciousness  In the ER patient complaining of right shoulder pain as well as left foot pain  Patient denies any fevers or chills  Social/Education/Vocational Hx:  Pt lives at home with his sister    Swallow Information   Current Risks for Dysphagia & Aspiration: pt reports solids foods feeling "stuck in throat"  Current Diet: NPO except medications   Baseline Diet: regular diet and thin liquids    Baseline Assessment   Behavior/Cognition: alert  Speech/Language Status: able to participate in conversation and able to follow commands  Patient Positioning: upright in bed  Pain Status/Interventions/Response to Interventions: No report of or nonverbal indications of pain  Swallow Mechanism Exam  Facial: symmetrical  Labial: WFL  Lingual: WFL  Velum: symmetrical  Mandible: adequate ROM  Dentition: limited dentition  Vocal quality:clear/adequate   Volitional Cough: strong/productive   Respiratory Status: on RA       Consistencies Assessed and Performance   Consistencies Administered: thin liquids, puree and hard solids (cookies)    Oral Stage: WFL  Mastication was adequate with the materials administered today  Bolus formation and transfer were functional with no significant oral residue noted  No overt s/s reduced oral control  Pharyngeal Stage: suspected mild  Swallow Mechanics: Swallowing initiation appeared slightly delayed with audible swallows   Laryngeal rise was palpated and judged to be within functional limits  No coughing, throat clearing, change in vocal quality or respiratory status noted today  Esophageal Concerns: none reported    Summary and Recommendations (see above)    Results Reviewed with: patient     Treatment Recommended: yes, MBS when able    Frequency of treatment: 1-2x    Dysphagia LTG  -Patient will demonstrate safe and effective oral intake (without overt s/s significant oral/pharyngeal dysphagia including s/s penetration or aspiration) for the highest appropriate diet level       Short Term Goals:  -Pt will tolerate Dysphagia 1/pureed diet and thin liquid with no significant s/s oral or pharyngeal dysphagia across 1-3 diagnostic sessions    -Patient will comply with a Video/Modified Barium Swallow study for more complete assessment of swallowing anatomy/physiology/aspiration risk and to assess efficacy of treatment techniques so as to best guide treatment plan

## 2023-02-06 NOTE — CONSULTS
Podiatry - Consultation    Patient Information:   Tony Stewart Colon 65 y o  female MRN: [id number]  Unit/Bed#: -01 Encounter: [id number]  PCP: Winnie Jackson MD  Date of Admission:  2/6/2023  Date of Consultation: 02/06/23  Requesting Physician: Jose G Hopper MD      ASSESSMENT:    German Lennon is a 65 y o  female with:    1  Left foot callus   2  Hx of left 5th metatarsal OM  -5th metatarsal resection DOS 7/24/2022  3  Rheumatoid Arthritis with positive RH   4  Shoulder dislocation  5  Venous insufficiency   6  Serous blister, left leg       PLAN:    · Left foot callus trimmed with 15 blade at bedside, no wound noted  Allyven bandage applied to the left leg serous blister  No clinical signs of infection noted to the bilateral lower extremities  Personally reviewed bilateral lower extremity (foot) radiographs  In comparison to the 7/24/2022 radiographs there is progression of RA at the level of the 2nd, 3rd and 4th MPJ joints  This is most likely where left foot pain is coming from  Patient stable to discharge from podiatry standpoint  · Reviewed patient's prior arterial duplex, above healing threshold  Bilateral pedal pulses dopplerable on today's examination  · Continue local wound care, appreciate nursing assistance with dressing changes  · Elevation and offloading on green foam wedges or pillows when non-ambulatory  · Rest of care per primary team     Weightbearing status: Weightbearing as tolerated    SUBJECTIVE:    History of Present Illness:    German Lennon is a 65 y o  female who is originally admitted 2/6/2023 due to fall with shoulder dislocation  Patient has a past medical history of osteomyelitis of the left foot, ambulatory dysfunction, rheumatoid with positive RA, hyponatremia, anxiety, restless leg syndrome, chronic pain syndrome and callus  We are consulted for left foot pain   patient has been previously by podiatry service for several hospital admissions with cellulitis of bilateral lower extremity cellulitis  Patient reports that since her last admission she has not noted any open wounds  She notes that she has a callus to her left foot that has been trimmed by Dr Gisselle Fox, however she has not noticed it open  Patient reports some pain to the left foot around the metatarsals, however it is only while she is walking  Patient denies rest pain at this time  Patient reports that several days ago she had a small blister to the left lower leg  She states that the majority of the drainage does come out, mostly serous  Patient denies any other pedal complaints at this time        Review of Systems:    Constitutional: Negative  HENT: Negative  Eyes: Negative  Respiratory: Negative  Cardiovascular: Negative  Gastrointestinal: Negative  Musculoskeletal: negative  Skin:callus    Neurological: neuropathy   Psych: Negative  Past Medical and Surgical History:     Past Medical History:   Diagnosis Date   • Acute blood loss anemia 9/9/2020   • Aftercare following joint replacement 9/9/2020    Patient had right reversed total shoulder arthroplasty   Pain was controlled when he left the hospital      • Anxiety    • Arthritis    • Depression    • Disease of thyroid gland     HYPO   • Femur neck fracture ([facility])    • GERD (gastroesophageal reflux disease)    • Hyperthyroidism     RESOLVED: [id number]   • Osteoporosis    • Polio    • PVD (peripheral vascular disease) ([facility])    • Right BBB/left ant fasc block    • UTI (urinary tract infection)    • Varicella infection     LAST ASSESSED: [id number]       Past Surgical History:   Procedure Laterality Date   • APPENDECTOMY     • HIP ARTHROPLASTY Left 11/27/2017    Procedure: REMOVAL IM NAIL CONVERSION TO TOTAL HIP ARTHROPLASTY POSTERIOR APPROACH;  Surgeon: Melina Daniel MD;  Location:  MAIN OR;  Service: Orthopedics   • HIP FRACTURE SURGERY     • HIP SURGERY      both hips replaced;2013 left ,2014 right   • JOINT REPLACEMENT Right     HIP TOTAL • GA ARTHROPLASTY GLENOHUMERAL JOINT TOTAL SHOULDER Right 9/2/2020    Procedure: ARTHROPLASTY SHOULDER REVERSE;  Surgeon: Jewel David MD;  [facility];  Service: Orthopedics   • GA ARTHROPLASTY GLENOHUMERAL JOINT TOTAL SHOULDER Left 6/1/2021    Procedure: ARTHROPLASTY SHOULDER REVERSE;  Surgeon: Jewel David MD;  [facility];  Service: Orthopedics   • GA CONV PREV HIP TOT HIP ARTHRP W/WO AGRFT/ALGRFT Left 10/4/2017    Procedure: Illene Rist removal of left hip;  Surgeon: Shayna King MD;  Location[facility];  Service: Orthopedics   • GA [address] 3 [address] Floors WRST SURG W/RLS TRANSVRS CARPL LIGM Right 5/24/2022    Procedure: RELEASE CARPAL TUNNEL ENDOSCOPIC-right;  Surgeon: Jem Vides MD;  [facility];  Service: Orthopedics   • REVISION TOTAL HIP ARTHROPLASTY Right    • TOE AMPUTATION Left 7/24/2022    Procedure: 5TH METATARSAL RESECTION WITH BOTTOM FLAP CLOSURE;  Surgeon: Jessie Perez DPM;  Location[facility];  Service: Podiatry   • TONSILLECTOMY         Meds/Allergies:    Medications Prior to Admission   Medication   • acetaminophen (TYLENOL) 650 mg CR tablet   • Ascorbic Acid, Vitamin C, (VITAMIN C) 100 MG tablet   • ferrous gluconate (FERGON) 324 mg tablet   • folic acid (FOLVITE) 1 mg tablet   • hydroxychloroquine (PLAQUENIL) 200 mg tablet   • levothyroxine 50 mcg tablet   • MELATONIN PO   • meloxicam (Mobic) 7 5 mg tablet   • methocarbamol (ROBAXIN) 750 mg tablet   • methotrexate 2 5 MG tablet   • Multiple Vitamins-Minerals (CENTRUM SILVER PO)   • nortriptyline (PAMELOR) 50 mg capsule   • OXYCODONE HCL PO   • predniSONE 5 mg tablet   • rOPINIRole (REQUIP) 2 mg tablet   • senna-docusate sodium (SENOKOT S) 8 6-50 mg per tablet       No Known Allergies    Social History:     Marital Status:     Substance Use History:   Social History     Substance and Sexual Activity   Alcohol Use Not Currently     Social History     Tobacco Use   Smoking Status Former   Smokeless Tobacco Never   Tobacco Comments    quit 20-30 years ago     Social History     Substance and Sexual Activity   Drug Use Not Currently       Family History:    Family History   Problem Relation Age of Onset   • Rheum arthritis Mother    • Rheum arthritis Sister    • Prostate cancer Brother          OBJECTIVE:    Vitals:   Blood Pressure: 161/88 (02/06/23 0945)  Pulse: 70 (02/06/23 0945)  Temperature: 98 °F (36 7 °C) (02/06/23 0945)  Temp Source: Oral (02/06/23 0732)  Respirations: 12 (02/06/23 0945)  SpO2: 98 % (02/06/23 0945)    Physical Exam:    General Appearance: Alert, cooperative, no distress  HEENT: Head normocephalic, atraumatic, without obvious abnormality  Heart: Normal rate and rhythm  Lungs: Non-labored breathing  No respiratory distress  Abdomen: Without distension  Psychiatric: AAOx3  Lower Extremity:    Vascular:   DP: Right: doppler signal Left: doppler signal  PT: Right: doppler signal Left: doppler signal  CRT < 3 seconds at the digits  +1/4 edema noted at bilateral lower extremities  Pedal hair is absent  Skin temperature is WNL bilaterally  Musculoskeletal:  MMT is 4/5 in all muscle compartments bilaterally  ROM at the 1st MPJ and ankle joint are reduced bilaterally with the leg extended  Pain on palpation of left 5th metatarsal     No gross deformities noted  Dermatological:  No open wounds noted, no clinical signs of infection  Xerosis to the plantar foot  Callus left fifth metatarsal, plantar aspect  No maceration noted interdigitally  Small seroma noted to the left lateral leg      Neurological:  Gross sensation is diminished  Light touch is diminished  Protective sensation is diminished          Additional data:     Lab Results: I have personally reviewed pertinent labs including:    Results from last 7 days   Lab Units 02/06/23  0421   WBC Thousand/uL 10 24*   HEMOGLOBIN g/dL 11 0*   HEMATOCRIT % 36 1   PLATELETS Thousands/uL 262   LYMPHO PCT % 4*   MONO PCT % 2* EOS PCT % 0     Results from last 7 days   Lab Units 02/06/23  0421   POTASSIUM mmol/L 4 1   CHLORIDE mmol/L 108   CO2 mmol/L 22   BUN mg/dL 23   CREATININE mg/dL 0 52*   CALCIUM mg/dL 9 0           Cultures: I have personally reviewed pertinent cultures including:              Imaging: I have personally reviewed pertinent reports in PACS  EKG, Pathology, and Other Studies: I have personally reviewed pertinent reports  Time Spent for Care: 30 minutes  More than 50% of total time spent on counseling and coordination of care as described above  ** Please Note: Portions of the record may have been created with voice recognition software  Occasional wrong word or "sound a like" substitutions may have occurred due to the inherent limitations of voice recognition software  Read the chart carefully and recognize, using context, where substitutions have occurred   **

## 2023-02-06 NOTE — ASSESSMENT & PLAN NOTE
· Noted by nursing staff, therefore speech consulted  · Recommending pureed diet with thin liquids once no longer NPO pending orthopedic intervention   · Video barium swallow   · Monitor closely, aspiration precautions

## 2023-02-06 NOTE — ASSESSMENT & PLAN NOTE
· Hx of ostomyelitis of the left foot   · Complaining of pain   - History of osteomyelitis of the left foot  Plan  > As above in  leg pain section

## 2023-02-06 NOTE — ED ATTENDING ATTESTATION
2/6/2023  I, Stephania Jaeger MD, saw and evaluated the patient  I have discussed the patient with the resident/non-physician practitioner and agree with the resident's/non-physician practitioner's findings, Plan of Care, and MDM as documented in the resident's/non-physician practitioner's note, except where noted  All available labs and Radiology studies were reviewed  I was present for key portions of any procedure(s) performed by the resident/non-physician practitioner and I was immediately available to provide assistance  At this point I agree with the current assessment done in the Emergency Department  I have conducted an independent evaluation of this patient a history and physical is as follows:    ED Course      Emergency Department NoteRobertha Bosworth Colon 65 y o  female MRN: [id number]    Unit/Bed#: ED 17 Encounter: [id number]    Fadia Zarco is a 65 y o  female who presents with   Chief Complaint   Patient presents with   • Fall     Pt brought in via EMS  Pt got out of bed to use the bathroom and fell on R side   + HS -LOC - thinners  History of Present Illness   HPI:  Fadia Zarco is a 65 y o  female who presents for evaluation of:  Noreene Shy onto her right side prior to arrival   Patient states that she had gotten up to go to the bathroom and then fell  Patient did strike her head; she is not on any anticoagulants and denies loss of consciousness  She has had multiple recent falls because of weakness but has refused admission in the past   Patient denies any associated chest pain and dyspnea  Review of Systems   Constitutional: Positive for fatigue  Negative for fever  HENT: Negative for congestion and sore throat  Respiratory: Negative for cough and shortness of breath  Cardiovascular: Negative for chest pain and palpitations  Gastrointestinal: Negative for abdominal pain and nausea  Genitourinary: Negative for flank pain and frequency     Neurological: Negative for light-headedness and headaches  Psychiatric/Behavioral: Negative for dysphoric mood and hallucinations  All other systems reviewed and are negative  Historical Information   Past Medical History:   Diagnosis Date   • Acute blood loss anemia 9/9/2020   • Aftercare following joint replacement 9/9/2020    Patient had right reversed total shoulder arthroplasty   Pain was controlled when he left the hospital      • Anxiety    • Arthritis    • Depression    • Disease of thyroid gland     HYPO   • Femur neck fracture ([facility])    • GERD (gastroesophageal reflux disease)    • Hyperthyroidism     RESOLVED: [id number]   • Osteoporosis    • Polio    • PVD (peripheral vascular disease) ([facility] [address] )    • Right BBB/left ant fasc block    • UTI (urinary tract infection)    • Varicella infection     LAST ASSESSED: [id number]     Past Surgical History:   Procedure Laterality Date   • APPENDECTOMY     • HIP ARTHROPLASTY Left 11/27/2017    Procedure: REMOVAL IM NAIL CONVERSION TO TOTAL HIP ARTHROPLASTY POSTERIOR APPROACH;  Surgeon: Alvina Francisco MD;  [facility];  Service: Orthopedics   • HIP FRACTURE SURGERY     • HIP SURGERY      both hips replaced;2013 left ,2014 right   • JOINT REPLACEMENT Right     HIP TOTAL   • [place] ARTHROPLASTY GLENOHUMERAL JOINT TOTAL SHOULDER Right 9/2/2020    Procedure: ARTHROPLASTY SHOULDER REVERSE;  Surgeon: Rui Fisher MD;  [facility];  Service: Orthopedics   • UT ARTHROPLASTY GLENOHUMERAL JOINT TOTAL SHOULDER Left 6/1/2021    Procedure: ARTHROPLASTY SHOULDER REVERSE;  Surgeon: Rui Fisher MD;  [facility];  Service: Orthopedics   • UT CONV PREV HIP TOT HIP ARTHRP W/WO AGRFT/ALGRFT Left 10/4/2017    Procedure: Hareware removal of left hip;  Surgeon: Alvina Francisco MD;  [facility];  Service: Orthopedics   • UT [address] [facility],2 [address] 3 S Floors WRST SURG W/RLS TRANSVRS CARPL LIGM Right 5/24/2022    Procedure: RELEASE CARPAL TUNNEL ENDOSCOPIC-right;  Surgeon: Leidy Govea MD;  [facility]; Service: Orthopedics   • REVISION TOTAL HIP ARTHROPLASTY Right    • TOE AMPUTATION Left 2022    Procedure: 5TH METATARSAL RESECTION WITH BOTTOM FLAP CLOSURE;  Surgeon: Noni Mann DPM;  [facility];  Service: Podiatry   • TONSILLECTOMY       Social History   Social History     Substance and Sexual Activity   Alcohol Use Not Currently     Social History     Substance and Sexual Activity   Drug Use Not Currently     Social History     Tobacco Use   Smoking Status Former   Smokeless Tobacco Never   Tobacco Comments    quit 20-30 years ago     Family History:   Family History   Problem Relation Age of Onset   • Rheum arthritis Mother    • Rheum arthritis Sister    • Prostate cancer Brother        Meds/Allergies   PTA meds:   Prior to Admission Medications   Prescriptions Last Dose Informant Patient Reported? Taking?    Ascorbic Acid, Vitamin C, (VITAMIN C) 100 MG tablet   Yes No   Sig: Take 400 mg by mouth daily   MELATONIN PO   Yes No   Sig: Take by mouth   Multiple Vitamins-Minerals (CENTRUM SILVER PO)   Yes No   Sig: Take 1 tablet by mouth daily   OXYCODONE HCL PO   Yes No   Sig: Take by mouth   acetaminophen (TYLENOL) 650 mg CR tablet   No No   Sig: Take 1 tablet (650 mg total) by mouth every 8 (eight) hours as needed for mild pain   ferrous gluconate (FERGON) 324 mg tablet   No No   Sig: Take 1 tablet (324 mg total) by mouth in the morning   folic acid (FOLVITE) 1 mg tablet   Yes No   Sig: Take 2,000 mcg by mouth daily   hydroxychloroquine (PLAQUENIL) 200 mg tablet   Yes No   Si pill am 1/2 pill pm   levothyroxine 50 mcg tablet   No No   Sig: Take 1 tablet (50 mcg total) by mouth daily in the early morning   meloxicam (Mobic) 7 5 mg tablet   No No   Sig: Take 1 tablet (7 5 mg total) by mouth daily   methocarbamol (ROBAXIN) 750 mg tablet   No No   Sig: Take 1 tablet (750 mg total) by mouth 2 (two) times a day as needed for muscle spasms   methotrexate 2 5 MG tablet   No No   Sig: 3 tablets by mouth once a week on Thursday   nortriptyline (PAMELOR) 50 mg capsule   No No   Sig: Take 2 capsules (100 mg total) by mouth daily at bedtime   predniSONE 5 mg tablet   Yes No   Sig: Take 5 mg by mouth daily   rOPINIRole (REQUIP) 2 mg tablet   No No   Sig: Take 1 tablet (2 mg total) by mouth daily at bedtime   senna-docusate sodium (SENOKOT S) 8 6-50 mg per tablet   No No   Sig: Take 1 tablet by mouth 2 (two) times a day      Facility-Administered Medications: None     No Known Allergies    Objective   First Vitals:   Blood Pressure: 134/62 (02/06/23 0346)  Pulse: 72 (02/06/23 0346)  Temperature: 97 7 °F (36 5 °C) (02/06/23 0346)  Temp Source: Oral (02/06/23 0346)  Respirations: 18 (02/06/23 0346)  SpO2: 96 % (02/06/23 0346)    Current Vitals:   Blood Pressure: 134/62 (02/06/23 0346)  Pulse: 72 (02/06/23 0346)  Temperature: 97 7 °F (36 5 °C) (02/06/23 0346)  Temp Source: Oral (02/06/23 0346)  Respirations: 18 (02/06/23 0346)  SpO2: 96 % (02/06/23 0346)    No intake or output data in the 24 hours ending 02/06/23 0423    Invasive Devices     Peripheral Intravenous Line  Duration           Peripheral IV 02/06/23 Right;Ventral (anterior) Forearm <1 day          Drain  Duration           Closed/Suction Drain Left;Posterior; Lateral Foot Bulb 10 Fr  196 days                Physical Exam  Vitals and nursing note reviewed  Constitutional:       General: She is not in acute distress  Appearance: Normal appearance  She is well-developed  HENT:      Head: Normocephalic and atraumatic  Right Ear: External ear normal       Left Ear: External ear normal       Nose: Nose normal       Mouth/Throat:      Pharynx: No oropharyngeal exudate  Eyes:      Conjunctiva/sclera: Conjunctivae normal       Pupils: Pupils are equal, round, and reactive to light  Cardiovascular:      Rate and Rhythm: Normal rate and regular rhythm  Pulmonary:      Effort: Pulmonary effort is normal  No respiratory distress     Abdominal: General: Abdomen is flat  There is no distension  Palpations: Abdomen is soft  Musculoskeletal:         General: No deformity  Normal range of motion  Cervical back: Normal range of motion and neck supple  Skin:     General: Skin is warm and dry  Capillary Refill: Capillary refill takes less than 2 seconds  Neurological:      General: No focal deficit present  Mental Status: She is alert and oriented to person, place, and time  Mental status is at baseline  Coordination: Coordination normal    Psychiatric:         Mood and Affect: Mood normal          Behavior: Behavior normal          Thought Content: Thought content normal          Judgment: Judgment normal            Medical Decision Makin  Acute head strike after fall: CT scan head; right hip x-ray  No results found for this or any previous visit (from the past 36 hour(s))  CT head without contrast    (Results Pending)   XR chest 1 view portable    (Results Pending)   XR hip/pelv 2-3 vws right if performed    (Results Pending)         Portions of the record may have been created with voice recognition software  Occasional wrong word or "sound a like" substitutions may have occurred due to the inherent limitations of voice recognition software  Read the chart carefully and recognize, using context, where substitutions have occurred            Critical Care Time  Procedures

## 2023-02-06 NOTE — ED PROVIDER NOTES
History  Chief Complaint   Patient presents with   • Fall     Pt brought in via EMS  Pt got out of bed to use the bathroom and fell on R side   + HS -LOC - thinners  Patient is a 66 YO F, PMHx of recurrent falls, arthritis, depression, hyperthyroidism, and polio, who presents to the ED after a fall  Patient states she got out of bed just prior to arrival when she loss her balance and fell onto her R side  Did strike her head  No LOC  NO AC/AP  Currently, complains of pain to the entire R side of her body  No SOB, no CP  Denies any other new or concerning symptoms  Chart reviewed  Patient with several visits for falls  Most recently, was seen on 2/3/2023 after a fall where she lost balance and fell to her knees  Imaging was negative  She was unable to walk in to the ED even with a walker, but she refused admission at this time  Patient is now agreeable to admission  Prior to Admission Medications   Prescriptions Last Dose Informant Patient Reported? Taking?    Ascorbic Acid, Vitamin C, (VITAMIN C) 100 MG tablet   Yes No   Sig: Take 400 mg by mouth daily   MELATONIN PO   Yes No   Sig: Take by mouth   Multiple Vitamins-Minerals (CENTRUM SILVER PO)   Yes No   Sig: Take 1 tablet by mouth daily   OXYCODONE HCL PO   Yes No   Sig: Take by mouth   acetaminophen (TYLENOL) 650 mg CR tablet   No No   Sig: Take 1 tablet (650 mg total) by mouth every 8 (eight) hours as needed for mild pain   ferrous gluconate (FERGON) 324 mg tablet   No No   Sig: Take 1 tablet (324 mg total) by mouth in the morning   folic acid (FOLVITE) 1 mg tablet   Yes No   Sig: Take 2,000 mcg by mouth daily   hydroxychloroquine (PLAQUENIL) 200 mg tablet   Yes No   Si pill am 1/2 pill pm   levothyroxine 50 mcg tablet   No No   Sig: Take 1 tablet (50 mcg total) by mouth daily in the early morning   meloxicam (Mobic) 7 5 mg tablet   No No   Sig: Take 1 tablet (7 5 mg total) by mouth daily   methocarbamol (ROBAXIN) 750 mg tablet   No No   Sig: Take 1 tablet (750 mg total) by mouth 2 (two) times a day as needed for muscle spasms   methotrexate 2 5 MG tablet   No No   Sig: 3 tablets by mouth once a week on Thursday   nortriptyline (PAMELOR) 50 mg capsule   No No   Sig: Take 2 capsules (100 mg total) by mouth daily at bedtime   predniSONE 5 mg tablet   Yes No   Sig: Take 5 mg by mouth daily   rOPINIRole (REQUIP) 2 mg tablet   No No   Sig: Take 1 tablet (2 mg total) by mouth daily at bedtime   senna-docusate sodium (SENOKOT S) 8 6-50 mg per tablet   No No   Sig: Take 1 tablet by mouth 2 (two) times a day      Facility-Administered Medications: None       Past Medical History:   Diagnosis Date   • Acute blood loss anemia 9/9/2020   • Aftercare following joint replacement 9/9/2020    Patient had right reversed total shoulder arthroplasty   Pain was controlled when he left the hospital      • Anxiety    • Arthritis    • Depression    • Disease of thyroid gland     HYPO   • Femur neck fracture (HCC)    • GERD (gastroesophageal reflux disease)    • Hyperthyroidism     RESOLVED: [id number]   • Osteoporosis    • Polio    • PVD (peripheral vascular disease) ([facility] [address] )    • Right BBB/left ant fasc block    • UTI (urinary tract infection)    • Varicella infection     LAST ASSESSED: [id number]       Past Surgical History:   Procedure Laterality Date   • APPENDECTOMY     • HIP ARTHROPLASTY Left 11/27/2017    Procedure: REMOVAL IM NAIL CONVERSION TO TOTAL HIP ARTHROPLASTY POSTERIOR APPROACH;  Surgeon: Lizandro Alves MD;  [facility];  Service: Orthopedics   • HIP FRACTURE SURGERY     • HIP SURGERY      both hips replaced;2013 left ,2014 right   • JOINT REPLACEMENT Right     HIP TOTAL   • TX ARTHROPLASTY GLENOHUMERAL JOINT TOTAL SHOULDER Right 9/2/2020    Procedure: ARTHROPLASTY SHOULDER REVERSE;  Surgeon: Lasha Rosenbaum MD;  [facility];  Service: Orthopedics   • TX ARTHROPLASTY GLENOHUMERAL JOINT TOTAL SHOULDER Left 6/1/2021    Procedure: ARTHROPLASTY SHOULDER REVERSE;  Surgeon: Carlee Ortiz MD;  [facility];  Service: Orthopedics   • CT CONV PREV HIP TOT HIP ARTHRP W/WO AGRFT/ALGRFT Left 10/4/2017    Procedure: Leidy Pleasure removal of left hip;  Surgeon: Tahir Jimenez MD;  [facility];  Service: Orthopedics   • CT [address] WRST SURG W/RLS TRANSVRS CARPL LIGM Right 5/24/2022    Procedure: RELEASE CARPAL TUNNEL ENDOSCOPIC-right;  Surgeon: Jacquelyn Cote MD;  [facility];  Service: Orthopedics   • REVISION TOTAL HIP ARTHROPLASTY Right    • TOE AMPUTATION Left 7/24/2022    Procedure: 5TH METATARSAL RESECTION WITH BOTTOM FLAP CLOSURE;  Surgeon: Ethan Torres DPM;  [facility];  Service: Podiatry   • TONSILLECTOMY         Family History   Problem Relation Age of Onset   • Rheum arthritis Mother    • Rheum arthritis Sister    • Prostate cancer Brother      I have reviewed and agree with the history as documented  E-Cigarette/Vaping   • E-Cigarette Use Never User      E-Cigarette/Vaping Substances   • Nicotine No    • THC No    • CBD No    • Flavoring No    • Other No    • Unknown No      Social History     Tobacco Use   • Smoking status: Former   • Smokeless tobacco: Never   • Tobacco comments:     quit 20-30 years ago   Vaping Use   • Vaping Use: Never used   Substance Use Topics   • Alcohol use: Not Currently   • Drug use: Not Currently        Review of Systems   Musculoskeletal:        Right shoulder pain, right chest wall pain, R hip pain   Skin: Negative for wound  Neurological: Negative for dizziness, syncope and headaches  All other systems reviewed and are negative        Physical Exam  ED Triage Vitals [02/06/23 0346]   Temperature Pulse Respirations Blood Pressure SpO2   97 7 °F (36 5 °C) 72 18 134/62 96 %      Temp Source Heart Rate Source Patient Position - Orthostatic VS BP Location FiO2 (%)   Oral Monitor Lying Right arm --      Pain Score       9             Orthostatic Vital Signs  Vitals: 02/06/23 0830 02/06/23 0945 02/06/23 1514 02/06/23 1728   BP: 116/56 161/88 (!) 183/96 (!) 176/94   Pulse: 68 70 60 79   Patient Position - Orthostatic VS:           Physical Exam  Vitals and nursing note reviewed  Constitutional:       General: She is not in acute distress  Appearance: She is well-developed  She is not diaphoretic  HENT:      Head: Normocephalic and atraumatic  Right Ear: External ear normal       Left Ear: External ear normal       Nose: Nose normal    Eyes:      General: Lids are normal  No scleral icterus  Cardiovascular:      Rate and Rhythm: Normal rate and regular rhythm  Heart sounds: Normal heart sounds  No murmur heard  No friction rub  No gallop  Pulmonary:      Effort: Pulmonary effort is normal  No respiratory distress  Breath sounds: Normal breath sounds  No wheezing or rales  Abdominal:      Palpations: Abdomen is soft  Tenderness: There is no abdominal tenderness  There is no guarding or rebound  Musculoskeletal:         General: No deformity  Normal range of motion  Cervical back: Normal range of motion and neck supple  Comments: Obvious deformity to R clavicle and shoulder, but per EMS and patient this is baseline   Skin:     General: Skin is warm and dry  Capillary Refill: Capillary refill takes less than 2 seconds  Neurological:      General: No focal deficit present  Mental Status: She is alert     Psychiatric:         Mood and Affect: Mood normal          Behavior: Behavior normal          ED Medications  Medications   acetaminophen (TYLENOL) tablet 650 mg (650 mg Oral Given 7/1/25 8205)   folic acid (FOLVITE) tablet 2,000 mcg (2,000 mcg Oral Given 2/6/23 1121)   ferrous gluconate (FERGON) tablet 324 mg (324 mg Oral Given 2/6/23 1122)   ascorbic acid (VITAMIN C) tablet 375 mg (375 mg Oral Given 2/6/23 1327)   hydroxychloroquine (PLAQUENIL) tablet 200 mg (200 mg Oral Given 2/6/23 1122)   levothyroxine tablet 50 mcg (0 mcg Oral Hold 2/6/23 0832)   methocarbamol (ROBAXIN) tablet 750 mg (750 mg Oral Given 2/6/23 1131)   nortriptyline (PAMELOR) capsule 100 mg (has no administration in time range)   predniSONE tablet 5 mg (5 mg Oral Given 2/6/23 1121)   rOPINIRole (REQUIP) tablet 2 mg (has no administration in time range)   senna-docusate sodium (SENOKOT S) 8 6-50 mg per tablet 1 tablet (1 tablet Oral Given 2/6/23 1723)   oxyCODONE (ROXICODONE) IR tablet 5 mg (5 mg Oral Given 2/6/23 1537)   dextrose 5 % and sodium chloride 0 9 % infusion (75 mL/hr Intravenous New Bag 2/6/23 0648)   hydroxychloroquine (PLAQUENIL) tablet 100 mg (100 mg Oral Given 2/6/23 1723)   methotrexate tablet 7 5 mg (has no administration in time range)   enoxaparin (LOVENOX) subcutaneous injection 40 mg (has no administration in time range)   labetalol (NORMODYNE) injection 5 mg (has no administration in time range)   vancomycin (VANCOCIN) IVPB (premix in dextrose) 1,000 mg 200 mL (1,000 mg Intravenous New Bag 2/6/23 1042)       Diagnostic Studies  Results Reviewed     Procedure Component Value Units Date/Time    Fingerstick Glucose (POCT) [[id number]]  (Normal) Collected: 02/06/23 0834    Lab Status: Final result Updated: 02/06/23 0835     POC Glucose 99 mg/dl     C-reactive protein [[id number]]  (Abnormal) Collected: 02/06/23 0752    Lab Status: Final result Specimen: Blood from Arm, Left Updated: 02/06/23 0816     CRP 11 1 mg/L     Sedimentation rate, automated [[id number]]  (Normal) Collected: 02/06/23 0752    Lab Status: Final result Specimen: Blood from Arm, Left Updated: 02/06/23 0811     Sed Rate 18 mm/hour     Basic metabolic panel [[id number]]  (Abnormal) Collected: 02/06/23 0421    Lab Status: Final result Specimen: Blood from Line Updated: 02/06/23 0515     Sodium 134 mmol/L      Potassium 4 1 mmol/L      Chloride 108 mmol/L      CO2 22 mmol/L      ANION GAP 4 mmol/L      BUN 23 mg/dL      Creatinine 0 52 mg/dL      Glucose 98 mg/dL      Calcium 9 0 mg/dL eGFR 91 ml/min/1 73sq m     Narrative:      [place] guidelines for Chronic Kidney Disease (CKD):   •  Stage 1 with normal or high GFR (GFR > 90 mL/min/1 73 square meters)  •  Stage 2 Mild CKD (GFR = 60-89 mL/min/1 73 square meters)  •  Stage 3A Moderate CKD (GFR = 45-59 mL/min/1 73 square meters)  •  Stage 3B Moderate CKD (GFR = 30-44 mL/min/1 73 square meters)  •  Stage 4 Severe CKD (GFR = 15-29 mL/min/1 73 square meters)  •  Stage 5 End Stage CKD (GFR <15 mL/min/1 73 square meters)  Note: GFR calculation is accurate only with a steady state creatinine    Manual Differential(PHLEBS Do Not Order) [[id number]]  (Abnormal) Collected: 02/06/23 0421    Lab Status: Final result Specimen: Blood from Vein Updated: 02/06/23 0506     Segmented % 92 %      Lymphocytes % 4 %      Monocytes % 2 %      Eosinophils, % 0 %      Basophils % 0 %      Atypical Lymphocytes % 2 %      Absolute Neutrophils 9 42 Thousand/uL      Lymphocytes Absolute 0 41 Thousand/uL      Monocytes Absolute 0 20 Thousand/uL      Eosinophils Absolute 0 00 Thousand/uL      Basophils Absolute 0 00 Thousand/uL      Total Counted --     RBC Morphology Present     Anisocytosis Present     Ovalocytes Present     Poikilocytes Present     Polychromasia Present     Platelet Estimate Adequate    CBC and differential [[id number]]  (Abnormal) Collected: 02/06/23 0421    Lab Status: Final result Specimen: Blood from Vein Updated: 02/06/23 0506     WBC 10 24 Thousand/uL      RBC 3 96 Million/uL      Hemoglobin 11 0 g/dL      Hematocrit 36 1 %      MCV 91 fL      MCH 27 8 pg      MCHC 30 5 g/dL      RDW 16 3 %      MPV 8 6 fL      Platelets 107 Thousands/uL     Narrative: This is an appended report  These results have been appended to a previously verified report  XR shoulder 2+ vw right   Final Result by Mónica Muñoz MD (02/06 1257)      The humerus is displaced anteriorly relative to the glenoid     No acute fracture visualized  Workstation performed: [id number]         CT head without contrast   Final Result by Sravan Membreno MD (02/06 5657)   Addendum (preliminary) 1 of 1 by Sravan Membreno MD (02/06 4253)   ADDENDUM:      Please note that on localizer images, there are bilateral shoulder    arthroplasties  The right shoulder arthroplasty is shown to be dislocated    on subsequent chest radiograph  This is not readily apparent on the    localizer image, only obtained in the    sagittal plane      Final      No acute intracranial abnormality  Nonemergent findings above  Workstation performed: [id number]         XR chest 1 view portable   Final Result by Sravan Membreno MD (02/06 9368)   No acute cardiopulmonary findings  Right shoulder arthroplasty fracture dislocation  Multilevel mild thoracic compression fractures, indeterminate age  Workstation performed: [id number]         XR hip/pelv 2-3 vws right if performed   Final Result by Sravan Membreno MD (02/06 3035)   No acute osseous abnormality  Workstation performed: [id number]         XR foot 2 vw left    (Results Pending)   XR foot 2 vw right    (Results Pending)   FL barium swallow video w speech    (Results Pending)   XR shoulder 2+ vw right    (Results Pending)         Procedures  Procedures      ED Course  ED Course as of 02/06/23 1742   Mon Feb 06, 2023   0556 CT head without contrast     No acute intracranial abnormality  Nonemergent findings above   0601 XR hip/pelv 2-3 vws right if performed  No acute osseous abnormality  2386 [id number] with SLIM  Accepts admission   0608 Informed SLIM about official shoulder Xray read  They will discuss with orthopedics  SBIRT 22yo+    Flowsheet Row Most Recent Value   SBIRT (23 yo +)    In order to provide better care to our patients, we are screening all of our patients for alcohol and drug use   Would it be okay to ask you these screening questions? Yes Filed at: 02/06/2023 3693   Initial Alcohol Screen: US AUDIT-C     1  How often do you have a drink containing alcohol? 0 Filed at: 02/06/2023 0357   2  How many drinks containing alcohol do you have on a typical day you are drinking? 0 Filed at: 02/06/2023 0357   3a  Male UNDER 65: How often do you have five or more drinks on one occasion? 0 Filed at: 02/06/2023 0357   3b  FEMALE Any Age, or MALE 65+: How often do you have 4 or more drinks on one occassion? 0 Filed at: 02/06/2023 0357   Audit-C Score 0 Filed at: 02/06/2023 9805   RUTH: How many times in the past year have you    Used an illegal drug or used a prescription medication for non-medical reasons? Never Filed at: 02/06/2023 1104                Medical Decision Making  Patient is a 65 y o  female who presents to the ED a fall  No focal neurologic deficits on exam      Based on history and physical, presentation most consistent with a mechanical fall  Initial considerations in this patient included intracranial hemorrhages (subarachnoid, subdural, epidural), delayed intracranial hemorrhages, cervical spine fractures and dislocations, spinal cord injuries, musculoskeletal injuries  Do not suspect syncope from cardiac etiologies including dysrhythmia and other neurologic etiologies including cerebrovascular accident (CVA) and transient ischemic attack (TIA)  Due to age, unable to apply [place] CT Head rule  Plan: CT Head without contrast, labs, admission for ambulatory dysfunction              Portions of the record may have been created with voice recognition software  Occasional wrong word or "sound a like" substitutions may have occurred due to the inherent limitations of voice recognition software  Read the chart carefully and recognize, using context, where substitutions have occurred  Ambulatory dysfunction: acute illness or injury  Amount and/or Complexity of Data Reviewed  Labs: ordered  Radiology: ordered  Decision-making details documented in ED Course  Risk  Decision regarding hospitalization  Disposition  Final diagnoses:   Ambulatory dysfunction   Shoulder fracture, right   Shoulder dislocation, right, initial encounter     Time reflects when diagnosis was documented in both MDM as applicable and the Disposition within this note     Time User Action Codes Description Comment    2/6/2023  5:14 AM Reno Felipe Add [R26 2] Ambulatory dysfunction     2/6/2023  6:09 AM Tenny Oris Add [S43 006A] Shoulder dislocation     2/6/2023  7:00 AM Tenny Oris Add [J29 713] Foot pain     2/6/2023  7:02 AM Tenny Oris Add [L89 894] Pressure injury of left foot, stage 4 ([facility] [address] )     2/6/2023  7:02 AM Tenny Oris Add [R88 544] Other acute osteomyelitis of left foot ([facility] [address] )     2/6/2023  7:02 AM Tenny Oris Add [L03 90] Cellulitis     2/6/2023  7:24 AM Tenny Oris Add [V94 225I,  Z96 612] 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     2/6/2023  7:24 AM Tenny Oris Add [C97 173,  M79 605] Pain in both lower extremities     2/6/2023  5:39 PM Reno Felipe Add [S42 91XA] Shoulder fracture, right     2/6/2023  5:39 PM Reno Felipe Add [S43 004A] Shoulder dislocation, right, initial encounter       ED Disposition     ED Disposition   Admit    Condition   Stable    Date/Time   Mon Feb 6, 2023  5:42 PM    Comment   Case was discussed with Dr Emerald Calabrese and the patient's admission status was agreed to be Admission Status: inpatient status to the service of Dr Emerald Calabrese             Follow-up Information    None         Current Discharge Medication List      CONTINUE these medications which have NOT CHANGED    Details   acetaminophen (TYLENOL) 650 mg CR tablet Take 1 tablet (650 mg total) by mouth every 8 (eight) hours as needed for mild pain  Qty: 30 tablet, Refills: 0    Associated Diagnoses: Carpal tunnel syndrome on right      Ascorbic Acid, Vitamin C, (VITAMIN C) 100 MG tablet Take 400 mg by mouth daily      ferrous gluconate (FERGON) 324 mg tablet Take 1 tablet (324 mg total) by mouth in the morning  Qty: 90 tablet, Refills: 0    Associated Diagnoses: Iron deficiency anemia, unspecified iron deficiency anemia type      folic acid (FOLVITE) 1 mg tablet Take 2,000 mcg by mouth daily      hydroxychloroquine (PLAQUENIL) 200 mg tablet 1 pill am 1/2 pill pm      levothyroxine 50 mcg tablet Take 1 tablet (50 mcg total) by mouth daily in the early morning  Qty: 90 tablet, Refills: 1    Associated Diagnoses: Hypothyroidism due to Hashimoto's thyroiditis; Chronic pain syndrome      MELATONIN PO Take by mouth      meloxicam (Mobic) 7 5 mg tablet Take 1 tablet (7 5 mg total) by mouth daily  Qty: 30 tablet, Refills: 0    Associated Diagnoses: Left foot pain      methocarbamol (ROBAXIN) 750 mg tablet Take 1 tablet (750 mg total) by mouth 2 (two) times a day as needed for muscle spasms  Qty: 30 tablet, Refills: 5    Associated Diagnoses: RLS (restless legs syndrome)      methotrexate 2 5 MG tablet 3 tablets by mouth once a week on Thursday  Qty:      Associated Diagnoses: Rheumatoid arthritis involving multiple sites with positive rheumatoid factor (HCC)      Multiple Vitamins-Minerals (CENTRUM SILVER PO) Take 1 tablet by mouth daily      nortriptyline (PAMELOR) 50 mg capsule Take 2 capsules (100 mg total) by mouth daily at bedtime  Qty: 180 capsule, Refills: 1    Associated Diagnoses: Other insomnia      OXYCODONE HCL PO Take by mouth      predniSONE 5 mg tablet Take 5 mg by mouth daily      rOPINIRole (REQUIP) 2 mg tablet Take 1 tablet (2 mg total) by mouth daily at bedtime  Qty: 90 tablet, Refills: 0    Associated Diagnoses: Restless legs syndrome      senna-docusate sodium (SENOKOT S) 8 6-50 mg per tablet Take 1 tablet by mouth 2 (two) times a day  Qty: 20 tablet, Refills: 0    Associated Diagnoses: Other osteomyelitis of left foot (HCC)           No discharge procedures on file      PDMP Review       Value Time User PDMP Reviewed  Yes 7/22/2022 12:00 AM [facility], [address]            ED Provider  Attending physically available and evaluated Geetha Rivera  FREDDIE managed the patient along with the ED Attending      Electronically Signed by         Nataly Joaquin DO  02/06/23 0182

## 2023-02-06 NOTE — ASSESSMENT & PLAN NOTE
-Mechanical fall last night onto her right side  -Chest x-ray: No acute cardiopulmonary findings  Right shoulder arthroplasty fracture dislocation  Multilevel mild thoracic compression fractures, indeterminate age   -History of rheumatoid arthritis it appears that patient has previously had right shoulder dislocated  Plan  >Admit to medicine  Keep n p o  except for meds overnight  Consult Orthopedics team   Pain control  IV fluids while NPO  If no need for surgical procedure per Ortho, will need diet given

## 2023-02-06 NOTE — PROGRESS NOTES
Ronald Hooker is a 65 y o  female who is currently ordered Vancomycin IV with management by the Pharmacy Consult service  Relevant clinical data and objective / subjective history reviewed  Vancomycin Assessment:  Indication and Goal AUC/Trough: Bone/joint infection (goal -600, trough >10), -600, trough >10  Clinical Status: stable  Micro:   N/A  Renal Function:  SCr: 0 52 mg/dL  CrCl: 55 mL/min  Renal replacement: Not on dialysis  Days of Therapy: 1  Current Dose: 1000 mg IV x1 load  Random post load level obtained for for more accurate regimen given weight   Vancomycin Plan:  New Dosin mg IV q12h  Estimated AUC: 422 mcg*hr/mL  Estimated Trough: 12 3 mcg/mL  Next Level: 2/8 random level with AM labs   Renal Function Monitoring: Daily BMP and UOP  Pharmacy will continue to follow closely for s/sx of nephrotoxicity, infusion reactions and appropriateness of therapy  BMP and CBC will be ordered per protocol  We will continue to follow the patient’s culture results and clinical progress daily      Mirella Osei PharmD  Emergency Medicine Clinical Pharmacist    or Rachel

## 2023-02-06 NOTE — ASSESSMENT & PLAN NOTE
· Endorses severe left foot pain on the plantar aspect; does have a history of osteomyelitis of the left foot  · Bilateral lower extremities notable for erythema of the lower extremities and warmth  · Afebrile, mild leukocytosis, continue to trend CBC, could be in setting of daily prednisone use   · Immunocompromised secondary to patient's rheumatoid arthritis  · Sed rate normal, CRP 11 1  · Podiatry evaluated, no evidence of underlying infection at this time  · Will d/c IV vancomycin and monitor   · Could be secondary to patient's underlying RA  · Local wound care

## 2023-02-07 ENCOUNTER — APPOINTMENT (INPATIENT)
Dept: RADIOLOGY | Facility: HOSPITAL | Age: 78
End: 2023-02-07

## 2023-02-07 ENCOUNTER — HOME HEALTH ADMISSION (OUTPATIENT)
Dept: HOME HEALTH SERVICES | Facility: HOME HEALTHCARE | Age: 78
End: 2023-02-07

## 2023-02-07 LAB
ALBUMIN SERPL BCP-MCNC: 2.9 G/DL (ref 3.5–5)
ALP SERPL-CCNC: 103 U/L (ref 46–116)
ALT SERPL W P-5'-P-CCNC: 15 U/L (ref 12–78)
ANION GAP SERPL CALCULATED.3IONS-SCNC: 5 MMOL/L (ref 4–13)
APTT PPP: 26 SECONDS (ref 23–37)
AST SERPL W P-5'-P-CCNC: 20 U/L (ref 5–45)
BASOPHILS # BLD AUTO: 0.02 THOUSANDS/ÂΜL (ref 0–0.1)
BASOPHILS NFR BLD AUTO: 0 % (ref 0–1)
BILIRUB SERPL-MCNC: 0.4 MG/DL (ref 0.2–1)
BUN SERPL-MCNC: 11 MG/DL (ref 5–25)
CALCIUM ALBUM COR SERPL-MCNC: 9.8 MG/DL (ref 8.3–10.1)
CALCIUM SERPL-MCNC: 8.9 MG/DL (ref 8.3–10.1)
CHLORIDE SERPL-SCNC: 107 MMOL/L (ref 96–108)
CO2 SERPL-SCNC: 28 MMOL/L (ref 21–32)
CREAT SERPL-MCNC: 0.49 MG/DL (ref 0.6–1.3)
EOSINOPHIL # BLD AUTO: 0.13 THOUSAND/ÂΜL (ref 0–0.61)
EOSINOPHIL NFR BLD AUTO: 1 % (ref 0–6)
ERYTHROCYTE [DISTWIDTH] IN BLOOD BY AUTOMATED COUNT: 16.2 % (ref 11.6–15.1)
GFR SERPL CREATININE-BSD FRML MDRD: 93 ML/MIN/1.73SQ M
GLUCOSE SERPL-MCNC: 95 MG/DL (ref 65–140)
GLUCOSE SERPL-MCNC: 95 MG/DL (ref 65–140)
GLUCOSE SERPL-MCNC: 97 MG/DL (ref 65–140)
HCT VFR BLD AUTO: 33.9 % (ref 34.8–46.1)
HGB BLD-MCNC: 10.2 G/DL (ref 11.5–15.4)
IMM GRANULOCYTES # BLD AUTO: 0.06 THOUSAND/UL (ref 0–0.2)
IMM GRANULOCYTES NFR BLD AUTO: 1 % (ref 0–2)
INR PPP: 0.97 (ref 0.84–1.19)
LYMPHOCYTES # BLD AUTO: 0.89 THOUSANDS/ÂΜL (ref 0.6–4.47)
LYMPHOCYTES NFR BLD AUTO: 9 % (ref 14–44)
MAGNESIUM SERPL-MCNC: 2 MG/DL (ref 1.6–2.6)
MCH RBC QN AUTO: 27.2 PG (ref 26.8–34.3)
MCHC RBC AUTO-ENTMCNC: 30.1 G/DL (ref 31.4–37.4)
MCV RBC AUTO: 90 FL (ref 82–98)
MONOCYTES # BLD AUTO: 0.64 THOUSAND/ÂΜL (ref 0.17–1.22)
MONOCYTES NFR BLD AUTO: 7 % (ref 4–12)
NEUTROPHILS # BLD AUTO: 7.77 THOUSANDS/ÂΜL (ref 1.85–7.62)
NEUTS SEG NFR BLD AUTO: 82 % (ref 43–75)
NRBC BLD AUTO-RTO: 0 /100 WBCS
PLATELET # BLD AUTO: 277 THOUSANDS/UL (ref 149–390)
PMV BLD AUTO: 8.5 FL (ref 8.9–12.7)
POTASSIUM SERPL-SCNC: 3.6 MMOL/L (ref 3.5–5.3)
PROT SERPL-MCNC: 6.6 G/DL (ref 6.4–8.4)
PROTHROMBIN TIME: 13.1 SECONDS (ref 11.6–14.5)
RBC # BLD AUTO: 3.75 MILLION/UL (ref 3.81–5.12)
SODIUM SERPL-SCNC: 140 MMOL/L (ref 135–147)
WBC # BLD AUTO: 9.51 THOUSAND/UL (ref 4.31–10.16)

## 2023-02-07 RX ADMIN — ENOXAPARIN SODIUM 40 MG: 40 INJECTION SUBCUTANEOUS at 08:12

## 2023-02-07 RX ADMIN — FOLIC ACID 2000 MCG: 1 TABLET ORAL at 08:12

## 2023-02-07 RX ADMIN — FERROUS GLUCONATE 324 MG: 324 TABLET ORAL at 08:12

## 2023-02-07 RX ADMIN — ROPINIROLE HYDROCHLORIDE 2 MG: 1 TABLET, FILM COATED ORAL at 21:51

## 2023-02-07 RX ADMIN — PREDNISONE 5 MG: 5 TABLET ORAL at 08:12

## 2023-02-07 RX ADMIN — NORTRIPTYLINE HYDROCHLORIDE 100 MG: 50 CAPSULE ORAL at 21:51

## 2023-02-07 RX ADMIN — SENNOSIDES AND DOCUSATE SODIUM 1 TABLET: 8.6; 5 TABLET ORAL at 08:12

## 2023-02-07 RX ADMIN — OXYCODONE HYDROCHLORIDE 5 MG: 5 TABLET ORAL at 17:29

## 2023-02-07 RX ADMIN — OXYCODONE HYDROCHLORIDE 5 MG: 5 TABLET ORAL at 02:23

## 2023-02-07 RX ADMIN — OXYCODONE HYDROCHLORIDE 5 MG: 5 TABLET ORAL at 08:42

## 2023-02-07 RX ADMIN — METHOCARBAMOL TABLETS 750 MG: 750 TABLET, COATED ORAL at 13:44

## 2023-02-07 RX ADMIN — Medication 375 MG: at 08:12

## 2023-02-07 RX ADMIN — SENNOSIDES AND DOCUSATE SODIUM 1 TABLET: 8.6; 5 TABLET ORAL at 17:29

## 2023-02-07 RX ADMIN — HYDROXYCHLOROQUINE SULFATE 200 MG: 200 TABLET ORAL at 08:12

## 2023-02-07 RX ADMIN — METHOCARBAMOL TABLETS 750 MG: 750 TABLET, COATED ORAL at 05:36

## 2023-02-07 RX ADMIN — ACETAMINOPHEN 650 MG: 325 TABLET ORAL at 17:29

## 2023-02-07 RX ADMIN — HYDROXYCHLOROQUINE SULFATE 100 MG: 200 TABLET ORAL at 17:29

## 2023-02-07 RX ADMIN — LEVOTHYROXINE SODIUM 50 MCG: 50 TABLET ORAL at 05:36

## 2023-02-07 NOTE — DISCHARGE INSTR - AVS FIRST PAGE
Discharge Instructions - Orthopedics  Concha Romano Colon 65 y o  female MRN: [id number]  Unit/Bed#: [facility] -01    Weight Bearing Status:                                           Weight bear as tolerated right upper extremity  Pain:  Continue analgesics as directed    Dressing Instructions:   Please keep clean, dry and intact until follow up     Appt Instructions: If you do not have your appointment, please call the clinic at [phone] t  Otherwise followup as scheduled     Contact the office sooner if you experience any increased numbness/tingling in the extremities

## 2023-02-07 NOTE — ASSESSMENT & PLAN NOTE
Appears to be chronic on review of records   · Currently on IV D5 NS at 75 cc/hr, will DC fluid if patient able to tolerate diet very well  · Continue to trend BMP

## 2023-02-07 NOTE — PROGRESS NOTES
[facility]  Progress Note Jaison Watts Colon 1945, 65 y o  female MRN: [id number]  Unit/Bed#: -Steven Encounter: [id number]  Primary Care Provider: Fran Gatica MD   Date and time admitted to hospital: 2/6/2023  3:33 AM    Dysphagia  Assessment & Plan  · Noted by nursing staff, therefore speech consulted  · Recommending pureed diet with thin liquids    · Pending Video barium swallow   · Monitor closely, aspiration precautions    Leg pain  Assessment & Plan  · Endorses severe left foot pain on the plantar aspect; does have a history of osteomyelitis of the left foot  · Bilateral lower extremities notable for erythema of the lower extremities and warmth  · Afebrile, mild leukocytosis, continue to trend CBC, could be in setting of daily prednisone use   · Immunocompromised secondary to patient's rheumatoid arthritis  · Sed rate normal, CRP 11 1  · Podiatry evaluated, no evidence of underlying infection at this time  · Patient will be monitored off antibiotics  · Could be secondary to patient's underlying RA  · Local wound care    Hyponatremia  Assessment & Plan  Appears to be chronic on review of records   · Currently on IV D5 NS at 75 cc/hr, will DC fluid if patient able to tolerate diet very well  · Continue to trend BMP      Rheumatoid arthritis involving multiple sites with positive rheumatoid factor (HCC)  Assessment & Plan  · History of rheumatoid arthritis  · On methotrexate 7 5 mg once weekly on Thursday, prednisone 5 mg daily and hydroxychloroquine, continue for now    Ambulatory dysfunction  Assessment & Plan  · S/p mechanical fall at home, lost balance and fell to her right side, denies any LOC  · CXR with right shoulder dislocation as noted above   · Additional imaging negative thus far for additional traumatic injury   · History of severe rheumatoid arthritis and has very limited ambulation at baseline but uses walker to ambulate at times; lives at home with her sister; patient reports that she has home health nurses that come and visit her and patient reports that she would be very hesitant about going to a skilled nursing facility as she wants to remain at her house  · Pending PT/OT eval   · CM consultation for aide in outpatient resources  · Orthopedic evaluation as above   · Fall precautions while inpatient     Hypothyroidism due to Hashimoto's thyroiditis  Assessment & Plan  · Continue PTA levothyroxine 50 mcg daily     * Shoulder dislocation  Assessment & Plan  · Pt presented s/p mechanical fall onto her right side at home, lives with her sister  Does have home health aides that come in at baseline   · Presented as a trauma, CT head and xray pelvis negative   · CXR with Right shoulder arthroplasty fracture dislocation  Multilevel mild thoracic compression fractures, indeterminate age  · Patient evaluated by Ortho, input appreciated  · Seems patient's dislocation was chronic in background of total reverse right shoulder arthroplasty as on exam reduction and dislocation was done with ease  Hence decision is made for observation at this point of time  · Continue as needed pain managed  · PT/OT eval ordered  VTE Pharmacologic Prophylaxis: VTE Score: 3 Moderate Risk (Score 3-4) - Pharmacological DVT Prophylaxis Ordered: enoxaparin (Lovenox)  Patient Centered Rounds: I performed bedside rounds with nursing staff today  Discussions with Specialists or Other Care Team Provider: CM    Education and Discussions with Family / Patient: Updated  (niece) via phone  Time Spent for Care: 30 minutes  More than 50% of total time spent on counseling and coordination of care as described above  Current Length of Stay: 1 day(s)  Current Patient Status: Inpatient   Certification Statement: The patient will continue to require additional inpatient hospital stay due to Pending PT/OT eval, and discharge planning    Discharge Plan: Anticipate discharge tomorrow to home with home services  Code Status: Level 3 - DNAR and DNI    Subjective:   Patient examined at bedside, denies any pain  No overnight events reported, as per nursing staff patient was able to tolerate diet  She is scheduled for video swallow eval this morning  Objective:     Vitals:   Temp (24hrs), Av 1 °F (36 7 °C), Min:98 °F (36 7 °C), Max:98 2 °F (36 8 °C)    Temp:  [98 °F (36 7 °C)-98 2 °F (36 8 °C)] 98 °F (36 7 °C)  HR:  [60-97] 78  Resp:  [12] 12  BP: (124-183)/(64-96) 124/64  SpO2:  [94 %-100 %] 97 %  There is no height or weight on file to calculate BMI  Input and Output Summary (last 24 hours):   No intake or output data in the 24 hours ending 23 0944    Physical Exam:   Physical Exam  HENT:      Head: Normocephalic  Mouth/Throat:      Pharynx: Oropharynx is clear  Eyes:      Conjunctiva/sclera: Conjunctivae normal       Pupils: Pupils are equal, round, and reactive to light  Cardiovascular:      Pulses: Normal pulses  Heart sounds: Normal heart sounds  Pulmonary:      Effort: Pulmonary effort is normal    Abdominal:      General: Abdomen is flat  Bowel sounds are normal    Musculoskeletal:      Comments: Right shoulder dislocated and externally rotated  Skin:     General: Skin is warm and dry  Neurological:      Mental Status: She is alert and oriented to person, place, and time            Additional Data:     Labs:  Results from last 7 days   Lab Units 23  0538   WBC Thousand/uL 9 51   HEMOGLOBIN g/dL 10 2*   HEMATOCRIT % 33 9*   PLATELETS Thousands/uL 277   NEUTROS PCT % 82*   LYMPHS PCT % 9*   MONOS PCT % 7   EOS PCT % 1     Results from last 7 days   Lab Units 23  0538   SODIUM mmol/L 140   POTASSIUM mmol/L 3 6   CHLORIDE mmol/L 107   CO2 mmol/L 28   BUN mg/dL 11   CREATININE mg/dL 0 49*   ANION GAP mmol/L 5   CALCIUM mg/dL 8 9   ALBUMIN g/dL 2 9*   TOTAL BILIRUBIN mg/dL 0 40   ALK PHOS U/L 103   ALT U/L 15   AST U/L 20 GLUCOSE RANDOM mg/dL 95     Results from last 7 days   Lab Units 02/07/23  0538   INR  0 97     Results from last 7 days   Lab Units 02/06/23  1106 02/06/23  0834   POC GLUCOSE mg/dl 113 99               Lines/Drains:  Invasive Devices     Peripheral Intravenous Line  Duration           Peripheral IV 02/06/23 Right;Ventral (anterior) Forearm 1 day                      Imaging: Reviewed radiology reports from this admission including: X-ray right shoulder, CT right upper extremity  Recent Cultures (last 7 days):         Last 24 Hours Medication List:   Current Facility-Administered Medications   Medication Dose Route Frequency Provider Last Rate   • acetaminophen  650 mg Oral Q6H PRN Army Quale, DO     • Ascorbic Acid (Vitamin C)  375 mg Oral Daily Army Quale, DO     • dextrose 5 % and sodium chloride 0 9 %  75 mL/hr Intravenous Continuous Army Quale, DO 75 mL/hr (02/06/23 2966)   • enoxaparin  40 mg Subcutaneous Q24H [address] Leidy Dorsey PA-C     • ferrous gluconate  324 mg Oral Daily Army Quale, DO     • folic acid  6,279 mcg Oral Daily Army Quale, DO     • hydroxychloroquine  100 mg Oral QPM Army Quale, DO     • hydroxychloroquine  200 mg Oral QAM Army Quale, DO     • levothyroxine  50 mcg Oral Early Morning Army Quale, DO     • methocarbamol  750 mg Oral BID PRN Army Quale, DO     • [START ON 2/9/2023] methotrexate  7 5 mg Oral Weekly Army Quale, DO     • nortriptyline  100 mg Oral HS Army Quale, DO     • oxyCODONE  5 mg Oral Q6H PRN Army Quale, DO     • predniSONE  5 mg Oral Daily Army Quale, DO     • rOPINIRole  2 mg Oral HS Army Quale, DO     • senna-docusate sodium  1 tablet Oral BID Army Quale, DO          Today, Patient Was Seen By: Jase Mcqueen MD    **Please Note: This note may have been constructed using a voice recognition system  **

## 2023-02-07 NOTE — ASSESSMENT & PLAN NOTE
· History of rheumatoid arthritis  · On methotrexate 7 5 mg once weekly on Thursday, prednisone 5 mg daily and hydroxychloroquine, continue for now

## 2023-02-07 NOTE — ASSESSMENT & PLAN NOTE
· Noted by nursing staff, therefore speech consulted  · Recommending pureed diet with thin liquids    · Pending Video barium swallow   · Monitor closely, aspiration precautions

## 2023-02-07 NOTE — PLAN OF CARE
Problem: OCCUPATIONAL THERAPY ADULT  Goal: Performs self-care activities at highest level of function for planned discharge setting  See evaluation for individualized goals  Note: Limitation: Decreased ADL status, Decreased UE ROM, Decreased UE strength, Decreased endurance, Decreased Safe judgement during ADL, Decreased self-care trans, Decreased high-level ADLs     Assessment: Pt is a 65 y o  female seen for OT evaluation s/p admit to [facility] on 2/6/2023 w/ Shoulder dislocation  Pt is s/p fall onto her R side  R shoulder rTSA dislocation, appears to be more or less chronic  No surgical intervention at this time per ortho  Pt also with chronic dislocated L shoulder  Pt does admit to multiple falls at home  Comorbidities affecting pt's functional performance at time of assessment include: RA, leg pain, anxiety, RLS, oseoporosis, RBBB, chronic pain syndrome, depression, L rTSA  Personal factors affecting pt at time of IE include:limited home support, difficulty performing ADLS, difficulty performing IADLS  and limited insight into deficits  Prior to admission, pt was living w her sister who has memory deficits  They live in a 2 SH  Pt reports staying on main floor with recliner chair and use of bsc  Pt states she needs assist w self care, can feed self and pull up own pants after toileting  She can transfer self, although, admits to 5+ falls  Pt and sister receives meals on wheels and has aides 3x week + additional family support  Upon evaluation: Pt requires max assist for ADL's, min assist for transfers/mobility  Pt reports difficulty feeding herself 2* the following deficits impacting occupational performance: weakness, decreased ROM, decreased strength, decreased balance, decreased tolerance, impaired problem solving and decreased safety awareness   Pt to benefit from continued skilled OT tx while in the hospital to address deficits as defined above and maximize level of functional independence w ADL's and functional mobility  Occupational Performance areas to address include: eating, grooming, dressing and clothing management  Based on findings from OT evaluation and functional performance deficits, pt has been identified as a  high complexity evaluation  The patient's raw score on the AM-PAC Daily Activity inpatient short form is 14, standardized score is 33 39, less than 39 4  Patients at this level are likely to benefit from discharge to post-acute rehabilitation services  From OT standpoint, recommendation at time of d/c would be home vs STR pending progress, family level of assist available, additional HHA etc  as patient is currently functioning below baseline

## 2023-02-07 NOTE — ASSESSMENT & PLAN NOTE
· Endorses severe left foot pain on the plantar aspect; does have a history of osteomyelitis of the left foot  · Bilateral lower extremities notable for erythema of the lower extremities and warmth  · Afebrile, mild leukocytosis, continue to trend CBC, could be in setting of daily prednisone use   · Immunocompromised secondary to patient's rheumatoid arthritis  · Sed rate normal, CRP 11 1  · Podiatry evaluated, no evidence of underlying infection at this time  · Patient will be monitored off antibiotics    · Could be secondary to patient's underlying RA  · Local wound care

## 2023-02-07 NOTE — OCCUPATIONAL THERAPY NOTE
Occupational Therapy Evaluation      Geetha Rivera    2/7/2023    Principal Problem:    Shoulder dislocation  Active Problems:    Hypothyroidism due to Hashimoto's thyroiditis    Ambulatory dysfunction    Rheumatoid arthritis involving multiple sites with positive rheumatoid factor (HCC)    Hyponatremia    Leg pain    Dysphagia      Past Medical History:   Diagnosis Date    Acute blood loss anemia 9/9/2020    Aftercare following joint replacement 9/9/2020    Patient had right reversed total shoulder arthroplasty   Pain was controlled when he left the hospital       Anxiety     Arthritis     Depression     Disease of thyroid gland     HYPO    Femur neck fracture (HCC)     GERD (gastroesophageal reflux disease)     Hyperthyroidism     RESOLVED: [id number]    Osteoporosis     Polio     PVD (peripheral vascular disease) ([address] )     Right BBB/left ant fasc block     UTI (urinary tract infection)     Varicella infection     LAST ASSESSED: [id number]       Past Surgical History:   Procedure Laterality Date    APPENDECTOMY      HIP ARTHROPLASTY Left 11/27/2017    Procedure: REMOVAL IM NAIL CONVERSION TO TOTAL HIP ARTHROPLASTY POSTERIOR APPROACH;  Surgeon: Martínez Sibley MD;  [facility];  Service: Orthopedics    HIP FRACTURE SURGERY      HIP SURGERY      both hips replaced;2013 left ,2014 right    JOINT REPLACEMENT Right     HIP TOTAL    KS ARTHROPLASTY GLENOHUMERAL JOINT TOTAL SHOULDER Right 9/2/2020    Procedure: ARTHROPLASTY SHOULDER REVERSE;  Surgeon: Markus Multani MD;  [facility];  Service: Orthopedics    KS ARTHROPLASTY GLENOHUMERAL JOINT TOTAL SHOULDER Left 6/1/2021    Procedure: ARTHROPLASTY SHOULDER REVERSE;  Surgeon: Markus Multani MD;  [facility];  Service: Orthopedics    KS CONV PREV HIP TOT HIP ARTHRP W/WO AGRFT/ALGRFT Left 10/4/2017    Procedure: Hareware removal of left hip;  Surgeon: Martínez Sibley MD;  [facility];  Service: Orthopedics     [address] W/RLS TRANSVRS CARPL LIGM Right 5/24/2022    Procedure: RELEASE CARPAL TUNNEL ENDOSCOPIC-right;  Surgeon: Santi Morales MD;  [facility];  Service: Orthopedics    REVISION TOTAL HIP ARTHROPLASTY Right     TOE AMPUTATION Left 7/24/2022    Procedure: 5TH METATARSAL RESECTION WITH BOTTOM FLAP CLOSURE;  Surgeon: Kirit Poole DPM;  [facility];  Service: Podiatry    TONSILLECTOMY          02/07/23 1112   OT Last Visit   OT Visit Date 02/07/23   Note Type   Note type Evaluation   Pain Assessment   Pain Assessment Tool 0-10   Pain Score No Pain  (reports medicine is working)   Restrictions/Precautions   Weight Bearing Precautions Per Order Yes   RUE Weight Bearing Per Order WBAT   LUE Weight Bearing Per Order WBAT   Other Precautions Chair Alarm; Bed Alarm;WBS;Multiple lines; Fall Risk;Pain   Home Living   Type of [address] [place] [address] Two level;Performs ADLs on one level   Bathroom Shower/Tub Tub/shower unit   Bathroom Toilet Standard   Bathroom Equipment Grab bars in shower; Shower chair;Commode   Home Equipment Walker   Additional Comments pt reports staying on the main floor, sleeping in a recliner chair, using bsc   Prior Function   Level of [place] Needs assistance with ADLs; Needs assistance with IADLS   Lives With [facility] Help From Personal care attendant; Family   IADLs Family/Friend/Other provides transportation; Family/Friend/Other provides meals; Family/Friend/Other provides medication management   Falls in the last 6 months 5 to 10   Comments pt lives w her sister who has memory issues but otherwise in good health  Lifestyle   [employer] family at bedside reports they have HHA 3xweek who assist w ADL's, home management etc  they also received meals on wheels  family in frequently to assist as needed   Reciprocal Relationships supportive family   General   Additional Pertinent History family at bedside reports pt currently taking oxy for pain   family reports pt will be able to transfer better when not taking oxy and when wearing shoes  Subjective   Subjective "I'll do better at home"   ADL   Eating Assistance 5  Supervision/Setup   Eating Deficit Setup; Increased time to complete   Grooming Assistance 4  Minimal Assistance   Grooming Deficit Wash/dry hands; Wash/dry face   UB Bathing Assistance 3  Moderate Assistance   UB Bathing Deficit Increased time to complete   LB Bathing Assistance 2  Maximal Assistance   LB Bathing Deficit Increased time to complete   UB Dressing Assistance 3  Moderate Assistance   LB Dressing Assistance 2  Maximal Assistance   Bed Mobility   Additional Comments pt needing assist to get to EOB, however, pt sleeps in recliner chair at home   Transfers   Sit to Stand 4  Minimal assistance   Additional items Assist x 1; Increased time required;Verbal cues  (1 person holding RW still)   Stand to Sit 4  Minimal assistance   Additional items Verbal cues   Stand pivot 4  Minimal assistance   Additional items Increased time required;Verbal cues   Additional Comments pt is fearful of falling  difficulty lifting feet, shuffles nearly crossing feet   Balance   Static Sitting Fair   Dynamic Sitting Poor +   Static Standing Poor +   Dynamic Standing Poor   Activity Tolerance   Activity Tolerance Patient limited by fatigue   Medical Staff Made Aware DPT Kelvin Sandhoff  pt seen for co-eval due to medical complexity   Nurse Made Aware ok to see   RUE Assessment   RUE Assessment X   RUE Overall AROM   R Shoulder Flexion 0 active flexion noted  approx 15* PROM   R Shoulder ABduction 10-20*   R Elbow Flexion WFL   RUE Strength   RUE Overall Strength Deficits   LUE Assessment   LUE Assessment X   LUE Overall AROM   L Shoulder Flexion 0AROM noted   15* PROM   L Shoulder ABduction 15*   L Elbow Flexion WFL   LUE Strength   LUE Overall Strength Deficits   Hand Function   Gross Motor Coordination Impaired   Fine Motor Coordination Impaired   Psychosocial   Psychosocial (WDL) WDL   Perception Inattention/Neglect Appears intact   Cognition   Arousal/Participation Alert; Cooperative   Attention Attends with cues to redirect   Orientation Level Oriented X4   Following Commands Follows one step commands with increased time or repetition   Assessment   Limitation Decreased ADL status; Decreased UE ROM; Decreased UE strength;Decreased endurance;Decreased Safe judgement during ADL;Decreased self-care trans;Decreased high-level ADLs   Assessment Pt is a 65 y o  female seen for OT evaluation s/p admit to [facility] w/ Shoulder dislocation  Pt is s/p fall onto her R side  R shoulder rTSA dislocation, appears to be more or less chronic  No surgical intervention at this time per ortho  Pt also with chronic dislocated L shoulder  Pt does admit to multiple falls at home  Comorbidities affecting pt's functional performance at time of assessment include: RA, leg pain, anxiety, RLS, oseoporosis, RBBB, chronic pain syndrome, depression, L rTSA  Personal factors affecting pt at time of IE include:limited home support, difficulty performing ADLS, difficulty performing IADLS  and limited insight into deficits  Prior to admission, pt was living w her sister who has memory deficits  They live in a 2 SH  Pt reports staying on main floor with recliner chair and use of bsc  Pt states she needs assist w self care, can feed self and pull up own pants after toileting  She can transfer self, although, admits to 5+ falls  Pt and sister receives meals on wheels and has aides 3x week + additional family support  Upon evaluation: Pt requires max assist for ADL's, min assist for transfers/mobility  Pt reports difficulty feeding herself 2* the following deficits impacting occupational performance: weakness, decreased ROM, decreased strength, decreased balance, decreased tolerance, impaired problem solving and decreased safety awareness   Pt to benefit from continued skilled OT tx while in the hospital to address deficits as defined above and maximize level of functional independence w ADL's and functional mobility  Occupational Performance areas to address include: eating, grooming, dressing and clothing management  Based on findings from OT evaluation and functional performance deficits, pt has been identified as a  high complexity evaluation  The patient's raw score on the AM-PAC Daily Activity inpatient short form is 14, standardized score is 33 39, less than 39 4  Patients at this level are likely to benefit from discharge to post-acute rehabilitation services  From OT standpoint, recommendation at time of d/c would be home vs STR pending progress, family level of assist available, additional HHA etc  as patient is currently functioning below baseline  Goals   Patient Goals to go home   Plan   Treatment Interventions ADL retraining;Functional transfer training; Endurance training;Cognitive reorientation;Patient/family training;Equipment evaluation/education; Fine motor coordination activities; Compensatory technique education; Energy conservation   Goal Expiration Date 02/21/23   OT Frequency 2-3x/wk   Recommendation   Additional Comments  at time of evaluation, dc recommendations are STR vs home pending progress   ideally, pt will need increased physical assist at home   [facility] Daily Activity Inpatient   Lower Body Dressing 2   Bathing 2   Toileting 2   Upper Body Dressing 2   Grooming 3   Eating 3   Daily Activity Raw Score 14   Daily Activity Standardized Score (Calc for Raw Score >=11) 33 39     OT GOALS TO BE ACHIEVED IN 14 DAYS:    Pt will demonstrate good balance sitting unsupported x 10 min for increased safety w self care and in preparation for increased indpendence    Pt will complete grooming w SBA/set up    Pt will complete LB bathing and dressing with mod assist    Pt will complete toileting w mod A and good safety     Pt will complete functional transfers with S/use of DME as needed demonstrating good safety Pt will tolerate standing at sinkside x 5 min w F+ balance for increased safety w hygiene    Pt will participate in ongoing cognitive assessment and training as appropriate to assist w safest dc plan    Pt will demonstrate good ECT/self pacing skills with all self care and functional mobility

## 2023-02-07 NOTE — ASSESSMENT & PLAN NOTE
· S/p mechanical fall at home, lost balance and fell to her right side, denies any LOC  · CXR with right shoulder dislocation as noted above   · Additional imaging negative thus far for additional traumatic injury   · History of severe rheumatoid arthritis and has very limited ambulation at baseline but uses walker to ambulate at times; lives at home with her sister; patient reports that she has home health nurses that come and visit her and patient reports that she would be very hesitant about going to a skilled nursing facility as she wants to remain at her house  · Pending PT/OT eval   · CM consultation for aide in outpatient resources    · Orthopedic evaluation as above   · Fall precautions while inpatient

## 2023-02-07 NOTE — PHYSICAL THERAPY NOTE
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) [T84 028A, Z96 612]  Pain in both lower extremities [M79 604, M79 605]  Other acute osteomyelitis of left foot ([facility] [address] ) [M86 172]  Unspecified multiple injuries, initial encounter [T07  XXXA]  Pressure injury of left foot, stage 4 ([facility] [address] ) [L85 894]    Problem List  Patient Active Problem List   Diagnosis    Anxiety    RLS (restless legs syndrome)    S/P total hip arthroplasty    Hypothyroidism due to Hashimoto's thyroiditis    Age-related osteoporosis without current pathological fracture    RBBB    Ambulatory dysfunction    Closed compression fracture of L3 lumbar vertebra    Rheumatoid arthritis involving multiple sites with positive rheumatoid factor ([facility])    Chronic pain syndrome    Vitamin D deficiency    Dyspepsia    Other forms of systemic lupus erythematosus ([facility] [address] )    Depression    Shoulder dislocation    Rupture long head biceps tendon, left, initial encounter    S/P reverse total shoulder arthroplasty, left    Shoulder pain, bilateral    Failed orthopedic implant (HCC)    Venous insufficiency    Hyponatremia    Anemia    Pressure injury of left foot, stage 4 (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)    Leg pain    Dysphagia       Past Medical History  Past Medical History:   Diagnosis Date    Acute blood loss anemia 9/9/2020    Aftercare following joint replacement 9/9/2020    Patient had right reversed total shoulder arthroplasty   Pain was controlled when he left the hospital       Anxiety     Arthritis     Depression     Disease of thyroid gland     HYPO    Femur neck fracture (HCC)     GERD (gastroesophageal reflux disease)     Hyperthyroidism     RESOLVED: [id number]    Osteoporosis     Polio     PVD (peripheral vascular disease) ([facility] [address] )     Right BBB/left ant fasc block     UTI (urinary tract infection)     Varicella infection     LAST ASSESSED: [id number]       Past Surgical History  Past Surgical History:   Procedure Laterality Date    APPENDECTOMY      HIP ARTHROPLASTY Left 11/27/2017    Procedure: REMOVAL IM NAIL CONVERSION TO TOTAL HIP ARTHROPLASTY POSTERIOR APPROACH;  Surgeon: Emi Schumacher MD;  [facility]: [facility];  Service: Orthopedics    HIP FRACTURE SURGERY      HIP SURGERY      both hips replaced;2013 left ,2014 right    JOINT REPLACEMENT Right     HIP TOTAL    SD ARTHROPLASTY GLENOHUMERAL JOINT TOTAL SHOULDER Right 9/2/2020    Procedure: ARTHROPLASTY SHOULDER REVERSE;  Surgeon: Gali Doty MD;  [facility];  Service: Orthopedics    SD ARTHROPLASTY GLENOHUMERAL JOINT TOTAL SHOULDER Left 6/1/2021    Procedure: ARTHROPLASTY SHOULDER REVERSE;  Surgeon: Gali Doty MD;  [facility];  Service: Orthopedics    SD CONV PREV HIP TOT HIP ARTHRP W/WO AGRFT/ALGRFT Left 10/4/2017    Procedure: Hareware removal of left hip;  Surgeon: Emi Schumacher MD;  [facility];  Service: Orthopedics    SD [address] WRST SURG W/RLS TRANSVRS CARPL LIGM Right 5/24/2022    Procedure: RELEASE CARPAL TUNNEL ENDOSCOPIC-right;  Surgeon: Kedar Roth MD;  Location: [facility];  Service: Orthopedics    REVISION TOTAL HIP ARTHROPLASTY Right     TOE AMPUTATION Left 7/24/2022    Procedure: 5TH METATARSAL RESECTION WITH BOTTOM FLAP CLOSURE;  Surgeon: Noni Mann DPM;  Location: [facility];  Service: Podiatry    TONSILLECTOMY          02/07/23 1120   PT Last Visit   PT Visit Date 02/07/23   Note Type   Note type Evaluation   Pain Assessment   Pain Assessment Tool 0-10   Pain Score 3   Pain Location/Orientation Location: Generalized   Patient's Stated Pain Goal No pain   Hospital Pain Intervention(s) Ambulation/increased activity;Repositioned Restrictions/Precautions   Weight Bearing Precautions Per Order Yes   RUE Weight Bearing Per Order WBAT   LUE Weight Bearing Per Order WBAT   Other Precautions Pain;WBS;Multiple lines;Telemetry; Fall Risk;Cognitive; Chair Alarm; Bed Alarm   Home Living   Type of Home House   Additional Comments resides w/ sister in 2 story home  Needs ADL and self care assist   sleeps in lift chair recliner  can take herself to and from commode during the day  family assists as does HHA that comes 3x/week  ambulates w/ RW   Prior Function   Falls in the last 6 months 5 to 10   General   Family/Caregiver Present Yes   Cognition   Overall Cognitive Status Impaired   Arousal/Participation Responsive   Attention Difficulty attending to directions   Orientation Level Oriented to person;Oriented to place   Memory Unable to assess   Following Commands Follows one step commands with increased time or repetition   Subjective   Subjective confused but cooperative  RLE Assessment   RLE Assessment   (strength grossly 3 to 3+/5)   LLE Assessment   LLE Assessment   (strength grossly 3 to 3+/5)   Coordination   Movements are Fluid and Coordinated 0   Coordination and Movement Description B LE ataxia   Bed Mobility   Supine to Sit 3  Moderate assistance   Additional items Assist x 1; Increased time required   Additional Comments sat EOB x multiple minutes prior to transfer   Transfers   Sit to Stand 4  Minimal assistance   Additional items Assist x 1; Increased time required; Impulsive;Verbal cues   Stand to Sit 4  Minimal assistance   Additional items Assist x 1; Increased time required; Impulsive;Verbal cues   Additional Comments time spent to reposition to comfort in chair   Ambulation/Elevation   Gait pattern   (narrow JEOVANY w  B feet internally rotated, short step length, dec foot clearance, antalgic)   Gait Assistance 4  Minimal assist   Additional items Assist x 1  (w/ 2nd for chair follow)   Assistive Device Rolling walker   Distance 3-4', chair brought up behind patient   Balance   Static Sitting Fair   Dynamic Sitting Poor +   Static Standing Poor +   Dynamic Standing Poor   Ambulatory Poor   Endurance Deficit   Endurance Deficit Yes   Endurance Deficit Description fatigue, weakness, pain, cog   Activity Tolerance   Activity Tolerance Patient limited by fatigue;Patient limited by pain;Treatment limited secondary to medical complications (Comment)   Nurse Made Aware yes   Assessment   Prognosis Fair   Problem List Decreased strength;Decreased endurance; Impaired balance;Decreased mobility; Decreased coordination; Impaired judgement;Decreased safety awareness;Decreased cognition;Pain   Assessment Pt seen for physical therapy evaluation, portion of which was performed as a co-evaluation w/ OT due to concerns re: medical stability  PT portion of evaluation focused on mobility assessment where OT portion focused more on ADLs, self care  Pt is a 64 y/o female w/ history/comorbidities of RA, OM, depression/anxiety, polio who is now admitted w  fall at home  Noted to have R shoulder chronic dislocation, ? foot infection  allowed to be WBAT and use RW per ortho  Due to acute medical issues, pain, fall risk, note unsatble clinical picture  PT consulted to assess mobility, d/c needs  Pt presents w/ decreased functional mob, standing balance, endurance, B LE sterength/coordination, barriers at home  Pt will benefit from skilled PT to correct for the above problems  Currently, would lean towards rehab as pt funtioning below mobility baseline  However, family member present for session- after eval, she noted pt would do better off of oxy and with her shoes  will follow up tomorrow to assess progress    Do anticipate family will refuse rehab- and if so, recommend as much support as possible w/ home therapy as well   will follow for needs   Goals   Patient Goals to go home   STG Expiration Date 02/21/23   Short Term Goal #1 1-2 wks: bed mob and transfers w/ S, standing balance to good w/ device, ambulate 25-75 ft w/ RW and S, increase B LE strength by 1/2 -1 grade, ambulate 5-7 stairs w/ min A   PT Treatment Day 0   Plan   Treatment/Interventions Functional transfer training;LE strengthening/ROM; Therapeutic exercise;Elevations; Endurance training;Patient/family training;Equipment eval/education;Gait training;Bed mobility   PT Frequency 3-5x/wk   Recommendation   PT Discharge Recommendation (S)  Post acute rehabilitation services  (see above for full recs)   AM-PAC Basic Mobility Inpatient   Turning in Flat Bed Without Bedrails 3   Lying on Back to Sitting on Edge of Flat Bed Without Bedrails 2   Moving Bed to Chair 3   Standing Up From Chair Using Arms 3   Walk in Room 3   Climb 3-5 Stairs With Railing 2   Basic Mobility Inpatient Raw Score 16   Basic Mobility Standardized Score 38 32   Highest Level Of Mobility   JH-HLM Goal 5: Stand one or more mins   JH-HLM Achieved 4: Move to chair/commode           Celina Rodriguez PT, DPT, CSRS

## 2023-02-07 NOTE — SPEECH THERAPY NOTE
Modified (Video) Barium Swallow Study    Summary:  Pt presents with mild oral and moderate pharyngeal dysphagia  Dysphagia is characterized by reduced bolus transfer/lingual motion, slightly delayed pharyngeal swallow, reduced anterior hyoid excursion, reduced pharyngeal contraction and pharyngeal residual with nectar, honey, puree and solids  Pt has a prominent CP bar resulting in transient pocketing/retention above the UES with retropulsion to the pyriforms, most significant with solids  Discussed with pt about consulting with ENT about her CP bar but pt does not want any more surgeries and would prefer to manage with diet modification  No aspiration occurred today  Images are on PACS for review  Recommendations:  Diet: dysphagia level 1 puree  Liquids: thin liquids  Meds: crushed with applesauce  Strategies: alternation of food/liquids  Frequent oral care  Upright position  F/u ST tx: brief ST f/u  Full assist with meals/feeding  Aspiration Precautions  Reflux Precautions  Consider consult with: discussed possible consult with ENT for CP bar, but pt does not wish for any more surgeries  Results reviewed with: pt      H&P/pertinent provider notes per internal medicine 2/6/23:  Mele Monge is a 65 y o  female who has past medical history significant for severe rheumatoid arthritis on PTA methotrexate/hydroxychloroquine/prednisone, previous shoulder dislocation, osteomyelitis of the left foot who presented to [facility] in the early morning hours of 2/6 via EMS after she got out of bed to get pain relief medication last night and lost her balance falling onto her right side  Patient reports that she uses a walker to ambulate as she has severe rheumatoid arthritis and that she was trying to grab an NSAID pain relief medication when she lost her balance  Patient reports that she has a life assist button that she pressed after she fell to notify EMS   She reports that she lives with her sister at home  She reports that she has nursing assistance that comes to her house to help her  Patient denies any chest pain, shortness of breath, lightheadedness prior to the fall  Patient denies any loss of consciousness  In the ER patient complaining of right shoulder pain as well as left foot pain  Patient denies any fevers or chills  Special Studies:  Head CT IMPRESSION 2/6/23:  No acute intracranial abnormality  Nonemergent findings above  CXR IMPRESSION 2/6/23:  No acute cardiopulmonary findings  Right shoulder arthroplasty fracture dislocation  Multilevel mild thoracic compression fractures, indeterminate age  Previous VBS:  n/a     Swallow Mechanism Exam  Facial: symmetrical  Labial: WFL  Lingual: WFL  Velum: symmetrical  Mandible: adequate ROM  Dentition: limited dentition  Vocal quality:clear/adequate   Respiratory Status: on RA      Swallow Information   Current Risks for Dysphagia & Aspiration: known history of dysphagia  Current Diet: puree/level 1 diet and thin liquids   Baseline Diet: regular diet and thin liquids    Consistencies Administered:  Pt was viewed sitting upright in the lateral and AP positions  Trials administered were consistent with MBSImP Validated Protocol: 5-mL thin liquid x2, 20-mL cup sip thin, 40-mL sequential swallow thin, 5-mL nectar thick, 20-mL cup sip nectar thick, 40-mL sequential swallow nectar thick, 5-mL Honey thick, 5-mL pudding, ½ cookie coated with 3-mL pudding, 5-mL nectar thick in the AP position, and 5-mL pudding in the AP position  Pt was also given thin liquids by straw       Oral Impairment:  Lip Closure: WFL  Tongue Control During Bolus Hold: WFL  Bolus Preparation/Mastication: prolonged  Bolus Transport/Lingual Motion: slightly reduced  Oral Residue: Mild with nectar and honey    Pharyngeal Impairment:  Initiation of the Pharyngeal Swallow: slightly delayed  Soft Palate Elevation: WFL  Laryngeal Elevation: decreased  Anterior Hyoid Excursion: reduced  Epiglottic Movement: WFL  Laryngeal Vestibular Closure: WFL  Pharyngeal Stripping Wave: reduced  Pharyngeal Contraction: reduced  PES Opening: significant/prominent CP bar present  Tongue Base Retraction: WFL  Pharyngeal Residue: mild with thin, nectar, honey, puree and solids    Screening of Esophagus  Esophagus: suspect decreased peristalsis     Penetration/Aspiration:  Thin: 2  Nectar: 2  Honey: 1  Puree: 1  Solid: 1  Response to Aspiration: n/a  Strategies/Efficacy: n/a    8-Point Penetration-Aspiration Scale   1 Material does not enter the airway   2 Material enters the airway, remains above the vocal folds, and is ejected  from the  airway    3 Material enters the airway, remains above the vocal folds, and is not ejected from the airway   4 Material enters the airway, contacts the vocal folds, and is ejected from the airway   5 Material enters the airway, contacts the vocal folds, and is not ejected from the airway    6 Material enters the airway, passes below the vocal folds and is ejected into the larynx or out of the airway    7 Material enters the airway, passes below the vocal folds, and is not ejected from the trachea despite effort    8 Material enters the airway, passes below the vocal folds, and no effort is made to eject

## 2023-02-07 NOTE — PLAN OF CARE
Problem: Potential for Falls  Goal: Patient will remain free of falls  Description: INTERVENTIONS:  - Educate patient/family on patient safety including physical limitations  - Instruct patient to call for assistance with activity   - Consult OT/PT to assist with strengthening/mobility   - Keep Call bell within reach  - Keep bed low and locked with side rails adjusted as appropriate  - Keep care items and personal belongings within reach  - Initiate and maintain comfort rounds  - Make Fall Risk Sign visible to staff  - Offer Toileting every    Hours, in advance of need  - Initiate/Maintain   alarm  - Obtain necessary fall risk management equipment:     - Apply yellow socks and bracelet for high fall risk patients  - Consider moving patient to room near nurses station  Outcome: Progressing     Problem: MOBILITY - ADULT  Goal: Maintain or return to baseline ADL function  Description: INTERVENTIONS:  -  Assess patient's ability to carry out ADLs; assess patient's baseline for ADL function and identify physical deficits which impact ability to perform ADLs (bathing, care of mouth/teeth, toileting, grooming, dressing, etc )  - Assess/evaluate cause of self-care deficits   - Assess range of motion  - Assess patient's mobility; develop plan if impaired  - Assess patient's need for assistive devices and provide as appropriate  - Encourage maximum independence but intervene and supervise when necessary  - Involve family in performance of ADLs  - Assess for home care needs following discharge   - Consider OT consult to assist with ADL evaluation and planning for discharge  - Provide patient education as appropriate  Outcome: Progressing  Goal: Maintains/Returns to pre admission functional level  Description: INTERVENTIONS:  - Perform BMAT or MOVE assessment daily    - Set and communicate daily mobility goal to care team and patient/family/caregiver     - Collaborate with rehabilitation services on mobility goals if consulted  - Perform Range of Motion times a day  - Reposition patient every      hours    - Dangle patient    times a day  - Stand patient    times a day  - Ambulate patient    times a day  - Out of bed to chair    times a day   - Out of bed for meals      times a day  - Out of bed for toileting  - Record patient progress and toleration of activity level   Outcome: Progressing     Problem: Prexisting or High Potential for Compromised Skin Integrity  Goal: Skin integrity is maintained or improved  Description: INTERVENTIONS:  - Identify patients at risk for skin breakdown  - Assess and monitor skin integrity  - Assess and monitor nutrition and hydration status  - Monitor labs   - Assess for incontinence   - Turn and reposition patient  - Assist with mobility/ambulation  - Relieve pressure over bony prominences  - Avoid friction and shearing  - Provide appropriate hygiene as needed including keeping skin clean and dry  - Evaluate need for skin moisturizer/barrier cream  - Collaborate with interdisciplinary team   - Patient/family teaching  - Consider wound care consult   Outcome: Progressing

## 2023-02-07 NOTE — PROGRESS NOTES
Progress Note - Orthopedics   Geetha Colon 65 y o  female MRN: [id number]  Unit/Bed#: Cat Scan      Subjective:    65 y  o female, no acute events, no new complaints  Patient doing well  Pain well controlled  Has some R shoulder pain this morning      Labs:  0   Lab Value Date/Time    HCT 36 1 02/06/2023 0421    HCT 37 7 11/30/2022 1053    HCT 37 1 08/02/2022 1328    HCT 36 8 12/16/2014 0727    HCT 28 8 (L) 10/30/2014 0626    HCT 29 2 (L) 10/27/2014 0648    HGB 11 0 (L) 02/06/2023 0421    HGB 11 3 (L) 11/30/2022 1053    HGB 11 3 (L) 08/02/2022 1328    HGB 11 9 12/16/2014 0727    HGB 8 8 (L) 10/30/2014 0626    HGB 9 0 (L) 10/27/2014 0648    INR 0 84 12/26/2021 0752    INR 0 96 10/13/2014 0608    WBC 10 24 (H) 02/06/2023 0421    WBC 6 01 11/30/2022 1053    WBC 6 50 08/02/2022 1328    WBC 5 60 12/16/2014 0727    WBC 5 58 10/30/2014 0626    WBC 6 95 10/27/2014 0648    ESR 18 02/06/2023 0752    CRP 11 1 (H) 02/06/2023 0752       Meds:    Current Facility-Administered Medications:   •  acetaminophen (TYLENOL) tablet 650 mg, 650 mg, Oral, Q6H PRN, [facility], DO, 650 mg at 02/06/23 1924  •  ascorbic acid (VITAMIN C) tablet 375 mg, 375 mg, Oral, Daily, [facility], DO, 375 mg at 02/06/23 1327  •  dextrose 5 % and sodium chloride 0 9 % infusion, 75 mL/hr, Intravenous, Continuous, [facility], , Last Rate: 75 mL/hr at 02/06/23 0648, 75 mL/hr at 02/06/23 0648  •  enoxaparin (LOVENOX) subcutaneous injection 40 mg, 40 mg, Subcutaneous, Q24H [address], Loye Crane, PA-C  •  ferrous gluconate (FERGON) tablet 324 mg, 324 mg, Oral, Daily, [facility], , 324 mg at 97/69/82 6416  •  folic acid (FOLVITE) tablet 2,000 mcg, 2,000 mcg, Oral, Daily, [facility], , 2,000 mcg at 02/06/23 1121  •  hydroxychloroquine (PLAQUENIL) tablet 100 mg, 100 mg, Oral, QPM, [facility], , 100 mg at 02/06/23 1723  •  hydroxychloroquine (PLAQUENIL) tablet 200 mg, 200 mg, Oral, QAM, [facility], , 200 mg at 02/06/23 1122  •  levothyroxine tablet 50 mcg, 50 mcg, Oral, Early Morning, Anika Barrett, DO  •  methocarbamol (ROBAXIN) tablet 750 mg, 750 mg, Oral, BID PRN, Anika Barrett, DO, 750 mg at 02/06/23 2205  •  [START ON 2/9/2023] methotrexate tablet 7 5 mg, 7 5 mg, Oral, Weekly, Anika Barrett, DO  •  nortriptyline (PAMELOR) capsule 100 mg, 100 mg, Oral, HS, Anika Barrett, DO, 100 mg at 02/06/23 2207  •  oxyCODONE (ROXICODONE) IR tablet 5 mg, 5 mg, Oral, Q6H PRN, Anika Barrett, DO, 5 mg at 02/07/23 8182  •  predniSONE tablet 5 mg, 5 mg, Oral, Daily, Anika Barrett, DO, 5 mg at 02/06/23 1121  •  rOPINIRole (REQUIP) tablet 2 mg, 2 mg, Oral, HS, Anika Barrett, DO, 2 mg at 02/06/23 2205  •  senna-docusate sodium (SENOKOT S) 8 6-50 mg per tablet 1 tablet, 1 tablet, Oral, BID, Anika Barrett, DO, 1 tablet at 02/06/23 1723    Blood Culture:   Lab Results   Component Value Date    BLOODCX No Growth After 5 Days  12/02/2019    BLOODCX No Growth After 5 Days  12/02/2019       Wound Culture:   Lab Results   Component Value Date    WOUNDCULT 2+ Growth of Staphylococcus aureus (A) 07/21/2022    WOUNDCULT 1+ Growth of Pseudomonas aeruginosa (A) 07/21/2022    WOUNDCULT (A) 07/21/2022     1+ Growth of - Acinetobacter pittii  / Acinetobacter species       Ins and Outs:  No intake/output data recorded  Physical:  Vitals:    02/06/23 2143   BP: 158/89   Pulse: 85   Resp:    Temp: 98 2 °F (36 8 °C)   SpO2: 96%     Musculoskeletal: right Upper Extremity  · Skin intact  No erythema or ecchymosis  In sling  · Obvious deformity about shoulder with anterior prominence of humeral component   · Non-tender to palpation about the shoulder   · Sensation intact to median/radial/ulnar nerve distribution   · Motor intact anterior interosseous nerve/posterior interosseous nerve/median/radial/ulnar nerve distributions  · 2+ radial pulse, symmetric bilaterally  · Digits warm and well perfused    Assessment:    65 y  o female with R shoulder rTSA dislocation s/p attempted reduction  R rTSA appears to be dislocated again this morning, the rTSA is likely chronically dislocation and reducing  Her mild R shoulder pain remains unchanged from her baseline  No indication for acute operative intervention  Will consult with her shoulder surgeon, plan for outpatient follow-up      Plan:  · Given this is likely chronically dislocated, patient can be WBAT  · PT/OT  · Pain control - per primary team  · DVT ppx - per primary team   · Follow up with Dr Polly Alvarado   · Dispo: Ortho will follow    Jelani Burns MD

## 2023-02-07 NOTE — ASSESSMENT & PLAN NOTE
· Pt presented s/p mechanical fall onto her right side at home, lives with her sister  Does have home health aides that come in at baseline   · Presented as a trauma, CT head and xray pelvis negative   · CXR with Right shoulder arthroplasty fracture dislocation  Multilevel mild thoracic compression fractures, indeterminate age  · Patient evaluated by Ortho, input appreciated  · Seems patient's dislocation was chronic in background of total reverse right shoulder arthroplasty as on exam reduction and dislocation was done with ease  Hence decision is made for observation at this point of time  · Continue as needed pain managed  · PT/OT eval ordered

## 2023-02-07 NOTE — CASE MANAGEMENT
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[facility] ) 10/14/2022   • Non-healing open wound of heel, initial encounter 10/12/2022   • Pain 2022   • Anemia 2022   • Pressure injury of left foot, stage 4 ([facility])    • Hyponatremia 2022   • Venous insufficiency 2021   • Shoulder pain, bilateral 2021   • Failed orthopedic implant ([facility] ) 2021   • S/P reverse total shoulder arthroplasty, left 2021   • Rupture long head biceps tendon, left, initial encounter 2021   • Shoulder dislocation 2021   • Depression 2020   • Other forms of systemic lupus erythematosus ([facility] ) 2020   • Dyspepsia 2019   • Vitamin D deficiency 2019   • Chronic pain syndrome 2019   • Rheumatoid arthritis involving multiple sites with positive rheumatoid factor ([place] [address] ) 2019   • Ambulatory dysfunction 2018   • Closed compression fracture of L3 lumbar vertebra 2018   • S/P total hip arthroplasty 2017   • Anxiety 2017   • RLS (restless legs syndrome) 2017   • RBBB 2017   • Age-related osteoporosis without current pathological fracture 2017   • Hypothyroidism due to Hashimoto's thyroiditis 2016      LOS (days): 1  Geometric Mean LOS (GMLOS) (days): 3 70  Days to GMLOS:2 3     OBJECTIVE:    Risk of Unplanned Readmission Score: 15 59         Current admission status: Inpatient       Preferred Pharmacy:   [address] [place] [address]  Phone: [phone] Fax: [phone]    Primary Care Provider: Jane Yin MD    Primary Insurance: MEDICARE  Secondary Insurance:     ASSESSMENT:  Jose Rosas Proxies    There are no active Health Care Proxies on file  Readmission Root Cause  30 Day Readmission: No    Patient Information  Admitted from[de-identified] Home  Mental Status: Alert  During Assessment patient was accompanied by: Not accompanied during assessment  Assessment information provided by[de-identified] Patient  Primary Caregiver: Other (Comment) (per hospital records has a HHA 15 hours a week)  Caregiver's Name[de-identified] Donna Neff (Niece)    [phone]  Caregiver's Relationship to Patient[de-identified] Family Member  Caregiver's Telephone Number[de-identified] [phone]  Support Systems: Family members, Self, Other (Comment) (HHA)  [address] of Residence: [address] [facility] you [email or web address] entry access options  Select all that apply : Stairs  Number of steps to enter home : 2  Type of Current Residence: 2 story home  Upon entering residence, is there a bedroom on the main floor (no further steps)?: Yes  Upon entering residence, is there a bathroom on the main floor (no further steps)?: Yes  In the last 12 months, was there a time when you were not able to pay the mortgage or rent on time?: No  In the last 12 months, how many places have you lived?: 1  In the last 12 months, was there a time when you did not have a steady place to sleep or slept in a shelter (including now)?: No  Homeless/housing insecurity resource given?: N/A  Living Arrangements: Other (Comment) (live with sister)    Activities of Daily Living Prior to Admission  Functional Status: Assistance  Completes ADLs independently?: No  Level of ADL dependence: Assistance  Ambulates independently?: Yes  Does patient use assisted devices?: Yes  Assisted Devices (DME) used:  Shower Chair, Straight Cane, Walker, Bedside Commode, Other (Comment) (medical alert, grab bar)  Does patient currently own DME?: Yes  What DME does the patient currently own?: Bedside Commode, Other (Comment), Shower Chair, Straight Cane, Walker (medical alert, grab bar)  Does patient have a history of Outpatient Therapy (PT/OT)?: No  Does the patient have a history of Short-Term Rehab?: Yes [facility])  Does patient have a history of HHC?: Yes ([facility])  Does patient currently have [facility]?: No         Patient Information Continued  Income Source: Pension/nursing home  Does patient have prescription coverage?: Yes ([address] [facility])  Within the past 12 months, you worried that your food would run out before you got the money to buy more : Never true  Within the past 12 months, the food you bought just didn't last and you didn't have money to get more : Never true  Food insecurity resource given?: N/A  Does patient receive dialysis treatments?: No  Does patient have a history of substance abuse?: No  Does patient have a history of Mental Health Diagnosis?: No         Means of Transportation  Means of Transport to Appts[de-identified] Family transport  In the past 12 months, has lack of transportation kept you from medical appointments or from getting medications?: No  In the past 12 months, has lack of transportation kept you from meetings, work, or from getting things needed for daily living?: No  Was application for public transport provided?: N/A        DISCHARGE DETAILS:    Discharge planning discussed with[de-identified] patient  Freedom of Choice: Yes  Comments - Freedom of Choice: refusing STR, will consider [facility]  CM contacted family/caregiver?: Yes  Were Treatment Team discharge recommendations reviewed with patient/caregiver?: Yes     Were patient/caregiver advised of the risks associated with not following Treatment Team discharge recommendations?: Yes    Contacts  Patient Contacts: saul Mayberry  Relationship to Patient[de-identified] Family  Contact Method: Phone  Phone Number: [phone]  Reason/Outcome: Continuity of Care, Discharge Planning    Requested Home Health Care         Is the patient interested in [address] at discharge?: Yes  [address] requested[de-identified] Nursing, Occupational Therapy, Physical [address] [facility] Provider[de-identified] PCP  Home Health Services Needed[de-identified] Gait/ADL Training, Evaluate Functional Status and Safety, Strengthening/Theraputic Exercises to Improve Function  Homebound Criteria Met[de-identified] Requires the Assistance of Another Person for Safe Ambulation or to Leave the Home, Uses an Assist Device (i e  cane, walker, etc)  Supporting Clincal Findings[de-identified] Fatigues Easliy in [employer], Limited Endurance    DME Referral Provided  Referral made for DME?: No    Other Referral/Resources/Interventions Provided:  Interventions: [facility]     Discharge Destination Plan[de-identified] Home with [address] [facility]     IMM Given (Date):: 02/07/23 (discussed with saul Smart by phone at 3:20pm and requested copy at patient's bedside )        Additional Comments: Patient refused STR for DC but reluctantly agreed to [address]  Patient said she prefers [facility] for DC needs  Referral was sent via Aidin and awaiting determination  Saul Mayberry was contacted and said she is in agreement with the plan for [address] at [place]  Neha Smart said she will take patient home at [place]  CM to be available         CM reviewed d/c planning process including the following: identifying help at home, patient preference for d/c planning needs, Discharge Lounge, [facility] Meds to Bed program, availability of treatment team to discuss questions or concerns patient and/or family may have regarding understanding medications and recognizing signs and symptoms once discharged  CM also encouraged patient to follow up with all recommended appointments after discharge  Patient advised of importance for patient and family to participate in managing patient’s medical well being

## 2023-02-08 VITALS
OXYGEN SATURATION: 94 % | RESPIRATION RATE: 16 BRPM | SYSTOLIC BLOOD PRESSURE: 162 MMHG | TEMPERATURE: 98.3 F | DIASTOLIC BLOOD PRESSURE: 91 MMHG | HEART RATE: 83 BPM

## 2023-02-08 LAB
ANION GAP SERPL CALCULATED.3IONS-SCNC: 6 MMOL/L (ref 4–13)
BUN SERPL-MCNC: 11 MG/DL (ref 5–25)
CALCIUM SERPL-MCNC: 8.8 MG/DL (ref 8.3–10.1)
CHLORIDE SERPL-SCNC: 106 MMOL/L (ref 96–108)
CO2 SERPL-SCNC: 28 MMOL/L (ref 21–32)
CREAT SERPL-MCNC: 0.45 MG/DL (ref 0.6–1.3)
GFR SERPL CREATININE-BSD FRML MDRD: 96 ML/MIN/1.73SQ M
GLUCOSE SERPL-MCNC: 100 MG/DL (ref 65–140)
GLUCOSE SERPL-MCNC: 149 MG/DL (ref 65–140)
GLUCOSE SERPL-MCNC: 92 MG/DL (ref 65–140)
POTASSIUM SERPL-SCNC: 3.3 MMOL/L (ref 3.5–5.3)
SODIUM SERPL-SCNC: 140 MMOL/L (ref 135–147)
VANCOMYCIN SERPL-MCNC: 2 UG/ML (ref 10–20)

## 2023-02-08 RX ORDER — POTASSIUM CHLORIDE 20 MEQ/1
40 TABLET, EXTENDED RELEASE ORAL ONCE
Status: COMPLETED | OUTPATIENT
Start: 2023-02-08 | End: 2023-02-08

## 2023-02-08 RX ADMIN — FERROUS GLUCONATE 324 MG: 324 TABLET ORAL at 08:44

## 2023-02-08 RX ADMIN — Medication 375 MG: at 08:44

## 2023-02-08 RX ADMIN — METHOCARBAMOL TABLETS 750 MG: 750 TABLET, COATED ORAL at 08:44

## 2023-02-08 RX ADMIN — HYDROXYCHLOROQUINE SULFATE 200 MG: 200 TABLET ORAL at 08:43

## 2023-02-08 RX ADMIN — OXYCODONE HYDROCHLORIDE 5 MG: 5 TABLET ORAL at 08:44

## 2023-02-08 RX ADMIN — PREDNISONE 5 MG: 5 TABLET ORAL at 08:44

## 2023-02-08 RX ADMIN — LEVOTHYROXINE SODIUM 50 MCG: 50 TABLET ORAL at 05:40

## 2023-02-08 RX ADMIN — SENNOSIDES AND DOCUSATE SODIUM 1 TABLET: 8.6; 5 TABLET ORAL at 08:44

## 2023-02-08 RX ADMIN — ENOXAPARIN SODIUM 40 MG: 40 INJECTION SUBCUTANEOUS at 08:43

## 2023-02-08 RX ADMIN — POTASSIUM CHLORIDE 40 MEQ: 1500 TABLET, EXTENDED RELEASE ORAL at 10:39

## 2023-02-08 RX ADMIN — FOLIC ACID 2000 MCG: 1 TABLET ORAL at 08:44

## 2023-02-08 RX ADMIN — OXYCODONE HYDROCHLORIDE 5 MG: 5 TABLET ORAL at 15:52

## 2023-02-08 NOTE — PLAN OF CARE
Problem: OCCUPATIONAL THERAPY ADULT  Goal: Performs self-care activities at highest level of function for planned discharge setting  See evaluation for individualized goals  Note: Limitation: Decreased ADL status, Decreased UE ROM, Decreased UE strength, Decreased endurance, Decreased Safe judgement during ADL, Decreased self-care trans, Decreased high-level ADLs     Assessment: Pt seen for skilled OT treatment session this AM  Pt seated up in recliner chair, agreeable to session  Pt needed mod assist to get to edge of chair, fair balance while seated at edge of chair  She needed max/total assist for LB dressing, mod assist to comb her hair  Pt also required mod assist and increased time for sit to stand, and to place hands onto walker  She was able to stand 2+ min for toileting hygiene as she had been incontinent of bowel  Nursing in to assist w cleaning patient as therapist needed to support pt in stance  Pt able to sit down w min assist  Made comfortable in chair, both elbows supported on top of pillows in order for pt to better reach her tray (lunch)  She initially stated she needed help w feeding, but w proper set up, she was able to feed herself lunch  From OT standpoint, she can benefit from STR upon dc from acute care  In the event that family decides to take her home, she'll need more family/HHA support than previous  Pt does need 24/7 care from OT standpoint  OT will continue to follow while in acute care       OT Discharge Recommendation: Post acute rehabilitation services

## 2023-02-08 NOTE — ASSESSMENT & PLAN NOTE
· Pt presented s/p mechanical fall onto her right side at home, lives with her sister  Does have home health aides that come in at baseline   · Presented as a trauma, CT head and xray pelvis negative   · CXR with Right shoulder arthroplasty fracture dislocation  Multilevel mild thoracic compression fractures, indeterminate age  · Patient evaluated by Ortho, input appreciated  · Seems patient's dislocation was chronic in background of total reverse right shoulder arthroplasty as on exam reduction and dislocation was done with ease  Hence decision is made for observation at this point of time  · Continue as needed pain managed  · PT/OT recommended rehab however patient refused for same  · Patient will be discharged home with home health

## 2023-02-08 NOTE — ASSESSMENT & PLAN NOTE
· Appears to be chronic on review of records  · S/p IV fluid resuscitation  · Sodium improved this morning

## 2023-02-08 NOTE — PLAN OF CARE
Problem: Potential for Falls  Goal: Patient will remain free of falls  Description: INTERVENTIONS:  - Educate patient/family on patient safety including physical limitations  - Instruct patient to call for assistance with activity   - Consult OT/PT to assist with strengthening/mobility   - Keep Call bell within reach  - Keep bed low and locked with side rails adjusted as appropriate  - Keep care items and personal belongings within reach  - Initiate and maintain comfort rounds  - Make Fall Risk Sign visible to staff  - Offer Toileting every Hours, in advance of need  - Initiate/Maintain   alarm  - Obtain necessary fall risk management equipment:       - Apply yellow socks and bracelet for high fall risk patients  - Consider moving patient to room near nurses station  Outcome: Progressing     Problem: MOBILITY - ADULT  Goal: Maintain or return to baseline ADL function  Description: INTERVENTIONS:  -  Assess patient's ability to carry out ADLs; assess patient's baseline for ADL function and identify physical deficits which impact ability to perform ADLs (bathing, care of mouth/teeth, toileting, grooming, dressing, etc )  - Assess/evaluate cause of self-care deficits   - Assess range of motion  - Assess patient's mobility; develop plan if impaired  - Assess patient's need for assistive devices and provide as appropriate  - Encourage maximum independence but intervene and supervise when necessary  - Involve family in performance of ADLs  - Assess for home care needs following discharge   - Consider OT consult to assist with ADL evaluation and planning for discharge  - Provide patient education as appropriate  Outcome: Progressing  Goal: Maintains/Returns to pre admission functional level  Description: INTERVENTIONS:  - Perform BMAT or MOVE assessment daily    - Set and communicate daily mobility goal to care team and patient/family/caregiver     - Collaborate with rehabilitation services on mobility goals if consulted  - Perform Range of Motion    times a day  - Reposition patient every      hours  - Dangle patient    times a day  - Stand patient    times a day  - Ambulate patient    times a day  - Out of bed to chair    times a day   - Out of bed for meals      times a day  - Out of bed for toileting  - Record patient progress and toleration of activity level   Outcome: Progressing     Problem: Prexisting or High Potential for Compromised Skin Integrity  Goal: Skin integrity is maintained or improved  Description: INTERVENTIONS:  - Identify patients at risk for skin breakdown  - Assess and monitor skin integrity  - Assess and monitor nutrition and hydration status  - Monitor labs   - Assess for incontinence   - Turn and reposition patient  - Assist with mobility/ambulation  - Relieve pressure over bony prominences  - Avoid friction and shearing  - Provide appropriate hygiene as needed including keeping skin clean and dry  - Evaluate need for skin moisturizer/barrier cream  - Collaborate with interdisciplinary team   - Patient/family teaching  - Consider wound care consult   Outcome: Progressing     Problem: Nutrition/Hydration-ADULT  Goal: Nutrient/Hydration intake appropriate for improving, restoring or maintaining nutritional needs  Description: Monitor and assess patient's nutrition/hydration status for malnutrition  Collaborate with interdisciplinary team and initiate plan and interventions as ordered  Monitor patient's weight and dietary intake as ordered or per policy  Utilize nutrition screening tool and intervene as necessary  Determine patient's food preferences and provide high-protein, high-caloric foods as appropriate       INTERVENTIONS:  - Monitor oral intake, urinary output, labs, and treatment plans  - Assess nutrition and hydration status and recommend course of action  - Evaluate amount of meals eaten  - Assist patient with eating if necessary   - Allow adequate time for meals  - Recommend/ encourage appropriate diets, oral nutritional supplements, and vitamin/mineral supplements  - Order, calculate, and assess calorie counts as needed  - Recommend, monitor, and adjust tube feedings and TPN/PPN based on assessed needs  - Assess need for intravenous fluids  - Provide specific nutrition/hydration education as appropriate  - Include patient/family/caregiver in decisions related to nutrition  Outcome: Progressing

## 2023-02-08 NOTE — PLAN OF CARE
Problem: PHYSICAL THERAPY ADULT  Goal: Performs mobility at highest level of function for planned discharge setting  See evaluation for individualized goals  Description: Treatment/Interventions: Functional transfer training, LE strengthening/ROM, Therapeutic exercise, Elevations, Endurance training, Patient/family training, Equipment eval/education, Gait training, Bed mobility          See flowsheet documentation for full assessment, interventions and recommendations  Outcome: Progressing  Note: Prognosis: Fair  Problem List: Decreased strength, Decreased endurance, Impaired balance, Decreased mobility, Decreased coordination, Decreased cognition, Impaired judgement, Decreased safety awareness, Pain  Assessment: Pt seen for session for setup, rolling, bed mob, time spent EOB, transfers/standing trials, gait w/  rest time, repositioning  Pt cooperatuve, remains confused  Needs frequent cues for safety and is a significant fall risk throughout  Needs cues for hand placement and safety w/ transfers  multiple LOB w/ gait w/ extremely narrow JEOVANY and internal rotation  Needs cues for RW mgmt/pbstacle avoidance  Minimal improvement in gait pattern and fall risk, while pt wearing shoes  continue to recommend rehab at d/c at this time  family has been declining same  will follow        PT Discharge Recommendation: Post acute rehabilitation services    See flowsheet documentation for full assessment

## 2023-02-08 NOTE — ASSESSMENT & PLAN NOTE
· History of rheumatoid arthritis  · On methotrexate 7 5 mg once weekly on Thursday, prednisone 5 mg daily and hydroxychloroquine, continue for now  · Continue her home meds on discharge

## 2023-02-08 NOTE — ASSESSMENT & PLAN NOTE
· S/p mechanical fall at home, lost balance and fell to her right side, denies any LOC  · CXR with right shoulder dislocation as noted above   · Additional imaging negative thus far for additional traumatic injury   · History of severe rheumatoid arthritis and has very limited ambulation at baseline but uses walker to ambulate at times; lives at home with her sister; patient reports that she has home health nurses that come and visit her and patient reports that she would be very hesitant about going to a skilled nursing facility as she wants to remain at her house  · CM consultation for aide in outpatient resources  · Evaluated by PT/OT, recommended rehab however patient refused for same, hence will be discharged home with home health

## 2023-02-08 NOTE — INCIDENTAL FINDINGS
The following findings require follow up:  Radiographic finding   Finding: Dislocation of right reverse total shoulder arthroplasty  Follow up required: yes   Follow up should be done within 2 month(s)    Please notify the following clinician to assist with the follow up:   Dr Naylor Bay

## 2023-02-08 NOTE — ASSESSMENT & PLAN NOTE
· Noted by nursing staff, therefore speech consulted  · Recommending pureed diet with thin liquids    · Patient had modified barium swallow, continue puréed diet with thin liquids on discharge

## 2023-02-08 NOTE — OCCUPATIONAL THERAPY NOTE
Occupational Therapy Treatment Note      Geetha Rivera    2/8/2023    Principal Problem:    Shoulder dislocation  Active Problems:    Hypothyroidism due to Hashimoto's thyroiditis    Ambulatory dysfunction    Rheumatoid arthritis involving multiple sites with positive rheumatoid factor (HCC)    Hyponatremia    Leg pain    Dysphagia      Past Medical History:   Diagnosis Date    Acute blood loss anemia 9/9/2020    Aftercare following joint replacement 9/9/2020    Patient had right reversed total shoulder arthroplasty   Pain was controlled when he left the hospital       Anxiety     Arthritis     Depression     Disease of thyroid gland     HYPO    Femur neck fracture (HCC)     GERD (gastroesophageal reflux disease)     Hyperthyroidism     RESOLVED: [id number]    Osteoporosis     Polio     PVD (peripheral vascular disease) ([facility] )     Right BBB/left ant fasc block     UTI (urinary tract infection)     Varicella infection     LAST ASSESSED: [id number]       Past Surgical History:   Procedure Laterality Date    APPENDECTOMY      HIP ARTHROPLASTY Left 11/27/2017    Procedure: REMOVAL IM NAIL CONVERSION TO TOTAL HIP ARTHROPLASTY POSTERIOR APPROACH;  Surgeon: Boris Flores MD;  [facility]: [facility];  Service: Orthopedics    HIP FRACTURE SURGERY      HIP SURGERY      both hips replaced;2013 left ,2014 right    JOINT REPLACEMENT Right     HIP TOTAL    AZ ARTHROPLASTY GLENOHUMERAL JOINT TOTAL SHOULDER Right 9/2/2020    Procedure: ARTHROPLASTY SHOULDER REVERSE;  Surgeon: Clyde Kumari MD;  [facility];  Service: Orthopedics    AZ ARTHROPLASTY GLENOHUMERAL JOINT TOTAL SHOULDER Left 6/1/2021    Procedure: ARTHROPLASTY SHOULDER REVERSE;  Surgeon: Clyde Kumari MD;  [facility];  Service: Orthopedics    AZ CONV PREV HIP TOT HIP ARTHRP W/WO AGRFT/ALGRFT Left 10/4/2017    Procedure: Hareware removal of left hip;  Surgeon: Boris Flores MD;  Location[facility];  Service: Orthopedics     [address] W/RLS TRANSVRS CARPL LIGM Right 5/24/2022    Procedure: RELEASE CARPAL TUNNEL ENDOSCOPIC-right;  Surgeon: Serena Martínez MD;  [facility];  Service: Orthopedics    REVISION TOTAL HIP ARTHROPLASTY Right     TOE AMPUTATION Left 7/24/2022    Procedure: 5TH METATARSAL RESECTION WITH BOTTOM FLAP CLOSURE;  Surgeon: Sacha Carmichael DPM;  [facility];  Service: Podiatry    TONSILLECTOMY          02/08/23 1134   OT Last Visit   OT Visit Date 02/08/23   Note Type   Note Type Treatment   Pain Assessment   Pain Assessment Tool 0-10   Pain Score No Pain   Restrictions/Precautions   RUE Weight Bearing Per Order WBAT   LUE Weight Bearing Per Order WBAT   Other Precautions Cognitive; Chair Alarm; Bed Alarm;Multiple lines; Fall Risk;Pain   Lifestyle   Autonomy pt assisted with self care at baseline  Has HHA 3xweek + family assist   Reciprocal Relationships supportive family   ADL   Where Assessed Chair   Eating Assistance 5  Supervision/Setup   Eating Deficit Setup; Increased time to complete   Eating Comments while seated in recliner chair, R arm/elbow propped up on a pillow to make it easier to reach food  pt then able to self feed   Grooming Assistance 3  Moderate Assistance   Grooming Deficit Increased time to complete   LB Dressing Assistance 1  Total Assistance   Toileting Assistance  1  Total Assistance   Toileting Deficit Clothing management up;Clothing management down;Perineal hygiene   Functional Standing Tolerance   Time 1-2 min   Activity standing w support of RW and mod assist of 1 person   Comments standing for hygiene/cleanup following BM   Bed Mobility   Additional Comments pt OOB in recliner chair   Transfers   Sit to Stand 3  Moderate assistance   Additional items Assist x 1; Increased time required;Verbal cues   Stand to Sit 4  Minimal assistance   Additional items Assist x 1; Increased time required;Verbal cues   Coordination   Gross Motor grossly impaired   Fine Motor impaired   Subjective Subjective "I can't control my bowels"   Cognition   Overall Cognitive Status Impaired   Arousal/Participation Cooperative   Attention Attends with cues to redirect   Orientation Level Oriented to person;Oriented to place   Following Commands Follows one step commands with increased time or repetition   Activity Tolerance   Activity Tolerance Patient limited by fatigue   Assessment   Assessment Pt seen for skilled OT treatment session this AM  Pt seated up in recliner chair, agreeable to session  Pt needed mod assist to get to edge of chair, fair balance while seated at edge of chair  She needed max/total assist for LB dressing, mod assist to comb her hair  Pt also required mod assist and increased time for sit to stand, and to place hands onto walker  She was able to stand 2+ min for toileting hygiene as she had been incontinent of bowel  Nursing in to assist w cleaning patient as therapist needed to support pt in stance  Pt able to sit down w min assist  Made comfortable in chair, both elbows supported on top of pillows in order for pt to better reach her tray (lunch)  She initially stated she needed help w feeding, but w proper set up, she was able to feed herself lunch  From OT standpoint, she can benefit from STR upon dc from acute care  In the event that family decides to take her home, she'll need more family/HHA support than previous  Pt does need 24/7 care from OT standpoint  OT will continue to follow while in acute care  Plan   Treatment Interventions ADL retraining;Functional transfer training; Endurance training;UE strengthening/ROM; Energy conservation; Activityengagement; Compensatory technique education;Patient/family training   Goal Expiration Date 02/21/23   OT Treatment Day 1   OT Frequency 2-3x/wk   Recommendation   OT Discharge Recommendation Post acute rehabilitation services   AM-PAC Daily Activity Inpatient   Lower Body Dressing 2   Bathing 2   Toileting 1   Upper Body Dressing 2   Grooming 3 Eating 3   Daily Activity Raw Score 13   Daily Activity Standardized Score (Calc for Raw Score >=11) 32 03

## 2023-02-08 NOTE — CASE MANAGEMENT
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[facility] ) 10/14/2022   • Non-healing open wound of heel, initial encounter 10/12/2022   • Pain 2022   • Anemia 2022   • Pressure injury of left foot, stage 4 ([facility])    • Hyponatremia 2022   • Venous insufficiency 2021   • Shoulder pain, bilateral 2021   • Failed orthopedic implant ([facility] ) 2021   • S/P reverse total shoulder arthroplasty, left 2021   • Rupture long head biceps tendon, left, initial encounter 2021   • Shoulder dislocation 2021   • Depression 2020   • Other forms of systemic lupus erythematosus ([facility] ) 2020   • Dyspepsia 2019   • Vitamin D deficiency 2019   • Chronic pain syndrome 2019   • Rheumatoid arthritis involving multiple sites with positive rheumatoid factor ([place] [address] ) 2019   • Ambulatory dysfunction 2018   • Closed compression fracture of L3 lumbar vertebra 2018   • S/P total hip arthroplasty 2017   • Anxiety 2017   • RLS (restless legs syndrome) 2017   • RBBB 2017   • Age-related osteoporosis without current pathological fracture 2017   • Hypothyroidism due to Hashimoto's thyroiditis 2016      LOS (days): 2  Geometric Mean LOS (GMLOS) (days): 3 70  Days to GMLOS:1 4     OBJECTIVE:  Risk of Unplanned Readmission Score: 15 66         Current admission status: Inpatient   Preferred Pharmacy:   [address] [place] [address]  Phone: [phone] Fax: [phone]    Primary Care Provider: Mikhail Winter MD    Primary Insurance: MEDICARE  Secondary Insurance:     DISCHARGE DETAILS:    Discharge planning discussed with[de-identified] Alyssa hughes and Nikia hughes     Comments - Freedom of Choice: Nikia Jaskaran will pick the patient up between 4:30 and 5pm tonight   to transport her home

## 2023-02-08 NOTE — PHYSICAL THERAPY NOTE
Physical Therapy Treatment Note       02/08/23 0920   PT Last Visit   PT Visit Date 02/08/23   Note Type   Note Type Treatment   Pain Assessment   Pain Assessment Tool 0-10   Pain Score No Pain   Restrictions/Precautions   Weight Bearing Precautions Per Order Yes   RUE Weight Bearing Per Order WBAT   LUE Weight Bearing Per Order WBAT   Other Precautions Pain; Fall Risk;Multiple lines;Telemetry;Cognitive; Chair Alarm; Bed Alarm   General   Chart Reviewed Yes   Family/Caregiver Present No   Cognition   Overall Cognitive Status Impaired   Arousal/Participation Responsive   Attention Attends with cues to redirect   Orientation Level Oriented to person;Oriented to place   Memory Unable to assess   Following Commands Follows one step commands inconsistently   Subjective   Subjective remains w/ some confusion  willing to participate w/ encouragement   Bed Mobility   Rolling R 4  Minimal assistance   Additional items Assist x 1; Increased time required   Rolling L 4  Minimal assistance   Additional items Assist x 1   Additional Comments rolling prior to bed mob to clean pt   sat EOB x multiple minutes to help pt don shoes/socks  time spent to reposition to comfort post session   Transfers   Sit to Stand 4  Minimal assistance   Additional items Assist x 1; Increased time required; Impulsive;Verbal cues   Stand to Sit 5  Supervision   Additional items Assist x 1; Increased time required; Impulsive;Verbal cues   Additional Comments standign trials x 2-3 min each prior to and post gait to assist in lower body dressing due to purewick   Ambulation/Elevation   Gait pattern   (slow, narrow JEOVANY, short step length, hip/LE interval rotation, B foot drag)   Gait Assistance 3  Moderate assist   Additional items Assist x 1;Verbal cues; Tactile cues  (w/ 2nd for chair follow, cues for step length/placement, RW mgmt, obstacle avoidance )   Assistive Device Rolling walker   Distance 20'x2, 10'x1 w/ 2-3 min standing rests    time spent to reposition post session   Balance   Static Sitting Fair   Dynamic Sitting Poor +   Static Standing Poor   Dynamic Standing Poor   Ambulatory Poor   Endurance Deficit   Endurance Deficit Yes   Endurance Deficit Description fatigue, weakness, pain, cog   Activity Tolerance   Activity Tolerance Patient limited by fatigue;Patient limited by pain;Treatment limited secondary to medical complications (Comment)   Nurse Made Aware yes   Assessment   Prognosis Fair   Problem List Decreased strength;Decreased endurance; Impaired balance;Decreased mobility; Decreased coordination;Decreased cognition; Impaired judgement;Decreased safety awareness;Pain   Assessment Pt seen for session for setup, rolling, bed mob, time spent EOB, transfers/standing trials, gait w/  rest time, repositioning  Pt cooperatuve, remains confused  Needs frequent cues for safety and is a significant fall risk throughout  Needs cues for hand placement and safety w/ transfers  multiple LOB w/ gait w/ extremely narrow JEOVANY and internal rotation  Needs cues for RW mgmt/pbstacle avoidance  Minimal improvement in gait pattern and fall risk, while pt wearing shoes  continue to recommend rehab at d/c at this time  family has been declining same  will follow   Goals   Patient Goals to go home   STG Expiration Date 02/21/23   PT Treatment Day 1   Plan   Treatment/Interventions Functional transfer training;LE strengthening/ROM; Therapeutic exercise; Endurance training;Patient/family training;Equipment eval/education;Gait training;Bed mobility   Progress Progressing toward goals   PT Frequency 3-5x/wk   Recommendation   PT Discharge Recommendation Post acute rehabilitation services   AM-PAC Basic Mobility Inpatient   Turning in Flat Bed Without Bedrails 3   Lying on Back to Sitting on Edge of Flat Bed Without Bedrails 3   Moving Bed to Chair 2   Standing Up From Chair Using Arms 3   Walk in Room 2   Climb 3-5 Stairs With Railing 1   Basic Mobility Inpatient Raw Score 14   Basic Mobility Standardized Score 35 55   Highest Level Of Mobility   -[facility] Goal 4: Move to chair/commode   -[facility] Achieved 7: Walk 25 feet or more     Katelyn Carty PT, DPT CSRS

## 2023-02-09 ENCOUNTER — HOME CARE VISIT (OUTPATIENT)
Dept: HOME HEALTH SERVICES | Facility: HOME HEALTHCARE | Age: 78
End: 2023-02-09

## 2023-02-09 VITALS
RESPIRATION RATE: 18 BRPM | SYSTOLIC BLOOD PRESSURE: 102 MMHG | DIASTOLIC BLOOD PRESSURE: 68 MMHG | OXYGEN SATURATION: 97 % | TEMPERATURE: 98.7 F | HEART RATE: 70 BPM

## 2023-02-13 ENCOUNTER — HOME CARE VISIT (OUTPATIENT)
Dept: HOME HEALTH SERVICES | Facility: HOME HEALTHCARE | Age: 78
End: 2023-02-13

## 2023-02-14 ENCOUNTER — HOME CARE VISIT (OUTPATIENT)
Dept: HOME HEALTH SERVICES | Facility: HOME HEALTHCARE | Age: 78
End: 2023-02-14

## 2023-02-14 ENCOUNTER — TRANSITIONAL CARE MANAGEMENT (OUTPATIENT)
Dept: FAMILY MEDICINE CLINIC | Facility: CLINIC | Age: 78
End: 2023-02-14

## 2023-02-14 VITALS
OXYGEN SATURATION: 98 % | RESPIRATION RATE: 18 BRPM | SYSTOLIC BLOOD PRESSURE: 118 MMHG | HEART RATE: 68 BPM | TEMPERATURE: 98 F | DIASTOLIC BLOOD PRESSURE: 64 MMHG

## 2023-02-14 VITALS
HEART RATE: 68 BPM | DIASTOLIC BLOOD PRESSURE: 70 MMHG | SYSTOLIC BLOOD PRESSURE: 120 MMHG | OXYGEN SATURATION: 97 % | RESPIRATION RATE: 20 BRPM

## 2023-02-14 VITALS — OXYGEN SATURATION: 100 % | HEART RATE: 72 BPM | SYSTOLIC BLOOD PRESSURE: 113 MMHG | DIASTOLIC BLOOD PRESSURE: 55 MMHG

## 2023-02-14 NOTE — CASE COMMUNICATION
OT evaluation completed this date with patient and her caregiver present  Skilled OT intervention recommended for ADL training with AE and DME to decrease fall risk and decrease dropping of utensils/implements to complete ADLs  Patient declines and states she only wants physical therapy  She and caregiver verbalize understanding that new orders can be obtained for OT if issues arise and she identifies goal that OT can address  OT disch arging at this time due to patient refusal of services

## 2023-02-15 NOTE — CASE COMMUNICATION
Telephone call received from patient's niece, Cheng Ken, who reported patient did not understand the benefits of skilled OT explained to her on day of OT sheldon   Niece reports patient is agreeable to occupational therapy and Plan of Care will be updated to reflect her agreement to [facility] ADL training, ADL transfer training, BUE ROM/ther ex and patient/caregiver education to decrease her fall risk and optimize her participation with ADLs

## 2023-02-16 ENCOUNTER — HOME CARE VISIT (OUTPATIENT)
Dept: HOME HEALTH SERVICES | Facility: HOME HEALTHCARE | Age: 78
End: 2023-02-16

## 2023-02-16 ENCOUNTER — LAB REQUISITION (OUTPATIENT)
Dept: LAB | Facility: HOSPITAL | Age: 78
End: 2023-02-16

## 2023-02-16 VITALS
SYSTOLIC BLOOD PRESSURE: 126 MMHG | HEART RATE: 80 BPM | DIASTOLIC BLOOD PRESSURE: 72 MMHG | TEMPERATURE: 98 F | RESPIRATION RATE: 18 BRPM

## 2023-02-16 VITALS — DIASTOLIC BLOOD PRESSURE: 64 MMHG | RESPIRATION RATE: 20 BRPM | SYSTOLIC BLOOD PRESSURE: 126 MMHG

## 2023-02-16 DIAGNOSIS — E87.1 HYPO-OSMOLALITY AND HYPONATREMIA: ICD-10-CM

## 2023-02-16 LAB
ANION GAP SERPL CALCULATED.3IONS-SCNC: 4 MMOL/L (ref 4–13)
BUN SERPL-MCNC: 23 MG/DL (ref 5–25)
CALCIUM SERPL-MCNC: 9.1 MG/DL (ref 8.3–10.1)
CHLORIDE SERPL-SCNC: 104 MMOL/L (ref 96–108)
CO2 SERPL-SCNC: 29 MMOL/L (ref 21–32)
CREAT SERPL-MCNC: 0.46 MG/DL (ref 0.6–1.3)
GFR SERPL CREATININE-BSD FRML MDRD: 95 ML/MIN/1.73SQ M
GLUCOSE SERPL-MCNC: 90 MG/DL (ref 65–140)
POTASSIUM SERPL-SCNC: 4.1 MMOL/L (ref 3.5–5.3)
SODIUM SERPL-SCNC: 137 MMOL/L (ref 135–147)

## 2023-02-17 ENCOUNTER — HOME CARE VISIT (OUTPATIENT)
Dept: HOME HEALTH SERVICES | Facility: HOME HEALTHCARE | Age: 78
End: 2023-02-17

## 2023-02-17 ENCOUNTER — TELEPHONE (OUTPATIENT)
Dept: OBGYN CLINIC | Facility: HOSPITAL | Age: 78
End: 2023-02-17

## 2023-02-17 NOTE — TELEPHONE ENCOUNTER
Patient's niece does not want to bring  Patient in unless there is something that can be done for the R shoulder  The shoulder pops in and out  Last time they were told there was nothing they could do for this  Please advise if she needs to come for followup?

## 2023-02-17 NOTE — TELEPHONE ENCOUNTER
Dr Hanh Pedersen has not seen Néstor Higgins in a few years for the right shoulder    I will review with him on Monday

## 2023-02-17 NOTE — TELEPHONE ENCOUNTER
Caller: Zhao    Doctor: William Vargas    Reason for call: Niece returning call to discuss appt    Call back#: [phone]

## 2023-02-17 NOTE — TELEPHONE ENCOUNTER
Left msg for niece to call back to confirm appt with Dr Sonal Jha for R Shoulder Dislocation 2/23  Awaiting call back

## 2023-02-19 ENCOUNTER — APPOINTMENT (EMERGENCY)
Dept: RADIOLOGY | Facility: HOSPITAL | Age: 78
End: 2023-02-19

## 2023-02-19 ENCOUNTER — HOSPITAL ENCOUNTER (INPATIENT)
Facility: HOSPITAL | Age: 78
LOS: 3 days | Discharge: HOME | End: 2023-02-22
Attending: EMERGENCY MEDICINE | Admitting: SURGERY

## 2023-02-19 ENCOUNTER — APPOINTMENT (INPATIENT)
Dept: RADIOLOGY | Facility: HOSPITAL | Age: 78
End: 2023-02-19

## 2023-02-19 ENCOUNTER — HOME CARE VISIT (OUTPATIENT)
Dept: HOME HEALTH SERVICES | Facility: HOME HEALTHCARE | Age: 78
End: 2023-02-19

## 2023-02-19 DIAGNOSIS — M24.419: Primary | ICD-10-CM
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
## 2023-02-19 DIAGNOSIS — S43.004A DISLOCATION OF RIGHT SHOULDER JOINT, INITIAL ENCOUNTER: ICD-10-CM
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
## 2023-02-19 DIAGNOSIS — S06.5XAA SDH (SUBDURAL HEMATOMA): ICD-10-CM
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
## 2023-02-19 DIAGNOSIS — S06.5XAA SUBDURAL HEMATOMA: ICD-10-CM

## 2023-02-19 DIAGNOSIS — M81.0 AGE-RELATED OSTEOPOROSIS WITHOUT CURRENT PATHOLOGICAL FRACTURE: ICD-10-CM

## 2023-02-19 PROBLEM — S06.33AA CEREBRAL CONTUSION: Status: ACTIVE | Noted: 2023-02-19

## 2023-02-19 PROBLEM — I60.9 SUBARACHNOID HEMORRHAGE (HCC): Status: ACTIVE | Noted: 2023-02-19

## 2023-02-19 LAB
ANION GAP SERPL CALCULATED.3IONS-SCNC: 10 MMOL/L (ref 4–13)
BASOPHILS # BLD AUTO: 0.02 THOUSANDS/ÂΜL (ref 0–0.1)
BASOPHILS NFR BLD AUTO: 0 % (ref 0–1)
BUN SERPL-MCNC: 17 MG/DL (ref 5–25)
CALCIUM SERPL-MCNC: 9 MG/DL (ref 8.3–10.1)
CHLORIDE SERPL-SCNC: 108 MMOL/L (ref 96–108)
CO2 SERPL-SCNC: 20 MMOL/L (ref 21–32)
CREAT SERPL-MCNC: 0.56 MG/DL (ref 0.6–1.3)
EOSINOPHIL # BLD AUTO: 0.13 THOUSAND/ÂΜL (ref 0–0.61)
EOSINOPHIL NFR BLD AUTO: 2 % (ref 0–6)
ERYTHROCYTE [DISTWIDTH] IN BLOOD BY AUTOMATED COUNT: 15.6 % (ref 11.6–15.1)
GFR SERPL CREATININE-BSD FRML MDRD: 89 ML/MIN/1.73SQ M
GLUCOSE SERPL-MCNC: 92 MG/DL (ref 65–140)
HCT VFR BLD AUTO: 30.5 % (ref 34.8–46.1)
HGB BLD-MCNC: 9.6 G/DL (ref 11.5–15.4)
IMM GRANULOCYTES # BLD AUTO: 0.05 THOUSAND/UL (ref 0–0.2)
IMM GRANULOCYTES NFR BLD AUTO: 1 % (ref 0–2)
LYMPHOCYTES # BLD AUTO: 0.93 THOUSANDS/ÂΜL (ref 0.6–4.47)
LYMPHOCYTES NFR BLD AUTO: 11 % (ref 14–44)
MCH RBC QN AUTO: 27.7 PG (ref 26.8–34.3)
MCHC RBC AUTO-ENTMCNC: 31.5 G/DL (ref 31.4–37.4)
MCV RBC AUTO: 88 FL (ref 82–98)
MONOCYTES # BLD AUTO: 1.2 THOUSAND/ÂΜL (ref 0.17–1.22)
MONOCYTES NFR BLD AUTO: 14 % (ref 4–12)
NEUTROPHILS # BLD AUTO: 6.06 THOUSANDS/ÂΜL (ref 1.85–7.62)
NEUTS SEG NFR BLD AUTO: 72 % (ref 43–75)
NRBC BLD AUTO-RTO: 0 /100 WBCS
PLATELET # BLD AUTO: 344 THOUSANDS/UL (ref 149–390)
PMV BLD AUTO: 8.4 FL (ref 8.9–12.7)
POTASSIUM SERPL-SCNC: 4.4 MMOL/L (ref 3.5–5.3)
RBC # BLD AUTO: 3.46 MILLION/UL (ref 3.81–5.12)
SODIUM SERPL-SCNC: 138 MMOL/L (ref 135–147)
WBC # BLD AUTO: 8.39 THOUSAND/UL (ref 4.31–10.16)

## 2023-02-19 RX ORDER — ACETAMINOPHEN 325 MG/1
650 TABLET ORAL EVERY 4 HOURS PRN
Status: DISCONTINUED | OUTPATIENT
Start: 2023-02-19 | End: 2023-02-22 | Stop reason: HOSPADM

## 2023-02-19 RX ORDER — ONDANSETRON 2 MG/ML
4 INJECTION INTRAMUSCULAR; INTRAVENOUS EVERY 6 HOURS PRN
Status: DISCONTINUED | OUTPATIENT
Start: 2023-02-19 | End: 2023-02-22 | Stop reason: HOSPADM

## 2023-02-19 RX ORDER — HYDROMORPHONE HCL IN WATER/PF 6 MG/30 ML
0.2 PATIENT CONTROLLED ANALGESIA SYRINGE INTRAVENOUS EVERY 4 HOURS PRN
Status: DISCONTINUED | OUTPATIENT
Start: 2023-02-19 | End: 2023-02-22 | Stop reason: HOSPADM

## 2023-02-19 RX ORDER — ROPINIROLE 2 MG/1
2 TABLET, FILM COATED ORAL ONCE
Status: COMPLETED | OUTPATIENT
Start: 2023-02-19 | End: 2023-02-19

## 2023-02-19 RX ORDER — KETOROLAC TROMETHAMINE 30 MG/ML
15 INJECTION, SOLUTION INTRAMUSCULAR; INTRAVENOUS ONCE
Status: COMPLETED | OUTPATIENT
Start: 2023-02-19 | End: 2023-02-19

## 2023-02-19 RX ORDER — METHOCARBAMOL 750 MG/1
750 TABLET, FILM COATED ORAL ONCE
Status: COMPLETED | OUTPATIENT
Start: 2023-02-19 | End: 2023-02-19

## 2023-02-19 RX ORDER — OXYCODONE HYDROCHLORIDE 5 MG/1
2.5 TABLET ORAL EVERY 4 HOURS PRN
Status: DISCONTINUED | OUTPATIENT
Start: 2023-02-19 | End: 2023-02-22 | Stop reason: HOSPADM

## 2023-02-19 RX ORDER — OXYCODONE HYDROCHLORIDE 5 MG/1
5 TABLET ORAL EVERY 4 HOURS PRN
Status: DISCONTINUED | OUTPATIENT
Start: 2023-02-19 | End: 2023-02-22 | Stop reason: HOSPADM

## 2023-02-19 RX ADMIN — ROPINIROLE 2 MG: 2 TABLET, FILM COATED ORAL at 02:49

## 2023-02-19 RX ADMIN — OXYCODONE HYDROCHLORIDE 5 MG: 5 TABLET ORAL at 14:45

## 2023-02-19 RX ADMIN — KETOROLAC TROMETHAMINE 15 MG: 30 INJECTION, SOLUTION INTRAMUSCULAR; INTRAVENOUS at 02:34

## 2023-02-19 RX ADMIN — OXYCODONE HYDROCHLORIDE 5 MG: 5 TABLET ORAL at 18:47

## 2023-02-19 RX ADMIN — OXYCODONE HYDROCHLORIDE 5 MG: 5 TABLET ORAL at 10:39

## 2023-02-19 RX ADMIN — METHOCARBAMOL TABLETS 750 MG: 750 TABLET, COATED ORAL at 02:34

## 2023-02-19 RX ADMIN — OXYCODONE HYDROCHLORIDE 5 MG: 5 TABLET ORAL at 22:48

## 2023-02-19 NOTE — QUICK NOTE
Speech Therapy      Visit Type: Treatment  -  Swallow    Precautions     - Medical precautions:    Aspiration     - Oxygen: nasal cannula.      - Complex lines: Extracorporeal membranes oxygenation.      - Lines in chart and on patient reviewed; cautions maintained through out session    - Affect/Behavior: alert, calm and appropriate    Current Diet: ground-minced/dysphagia 2 and nectar-thick liquids      - Dentition: intact    - Feeding: feeds self    SUBJECTIVE      Patient awake and alert; received upright in chair. Pt with c/o leg/back pain; pt also requesting to lay back in bed, RN aware  Remains on [facility].    Patient/family goals: return to previous functional status and maximize function     OBJECTIVE           Swallowing   No temperature spike noted.  Patient positioning: upright in chair  Pretrial vocal quality: dysphonic  Volitional swallow: present    Consistencies:  Nectar Thick Liquid (cup):     - Oral:  Intact   - Pharyngeal: intact  Dysphagia 1/Puree:     - Oral: intact   - Pharyngeal: intact  General Consistency:    - Oral: impaired, slow oral transit, slow mastication and oral residue   - Pharyngeal: impaired, multiple swallows noted                          ASSESSMENT                                                                        • Impairments: swallowing  • Functional Limitations: eating/drinking  • Skilled therapy is required to establish safe means of nutrition, hydration and medication administration  • Pt accepted trials of nectar thick liquids, pureed solids and solids. Pt continues to demonstrate increased mastication time of solids with noted mild oral residue which was cleared with liquid wash. Pt continues to require verbal cues to utilize double swallow; however no overt clinical signs of aspiration were noted during this evaluation.     Recommend continuation of mechanical soft and nectar thick liquid diet. Educated pt at length re: swallow strategies; pt demonstrated  Trauma team made orthopedics aware that patient is currently in the emergency room for a subdural hematoma that she sustained during a fall  She has dislocations of bilateral shoulders, which is chronic in nature  She has been seeing both Dr Dave Bee and Dr Mg Zuniga for this condition  Per note from Dr Mg Zuniga on 10/13/2022, it is recommended that "she does not undergo any further close reductions as they would not be likely to be successful " As such, there is no orthopedic intervention warranted during her admission  She has a follow-up appointment scheduled with Dr Mg Zuniga this upcoming week, which point she can continue to receive further evaluation and management for her condition  understanding.   • Rehab Potential: good  • Potential barriers to progress:  Severity of deficits  • Progress: Improving as expected and Progressing toward goals    Patient at end of session:    - location: in chair    - safety measures: equipment intact, lines intact and call light within reach    - hand off to: nurse    PLAN  Mechanical soft/nectar thick diet/trials of solids  Interventions:  Assess tolerance of least restrictive oral diet and dysphagia therapy    Plan/Goal Agreement:  Patient agrees with goals and treatment plan    RECOMMENDATIONS     -Diet:          *ground-minced/dysphagia 2 and nectar-thick liquids    -Medication Administration:         *via feeding tube and crushed    -Feeding Guidelines:          *small bites/sips, distraction free environment, constant supervision, alternate solids/liquids and sit fully upright for all po intake   -Instrumental Assessments:         *Videofluoroscopic swallow study (VFSS)    -Speech Reviewed Swallow:         *with patient/family, with clinical caregivers and feeding guidelines posted in room    GOALS    Long Term Goals:   Patient to tolerate least restrictive po diet with no s/s of aspiration across 90% of trials to  minimized aspiration risk. Ongoing       Documented in the chart in the following areas: Assessment.      Therapy procedure time and total treatment time can be found documented on the Time Entry flowsheet

## 2023-02-19 NOTE — ED NOTES
Patient took 1 bite of lunch and 4oz of orange juice - declines additional food or drink at this time       Jairo Vizcaino RN  02/19/23 [id number]

## 2023-02-19 NOTE — H&P
H&P - Trauma   Sarthak Saunders Colon 65 y o  female MRN: [id number]  Unit/Bed#: ED 28 Encounter: [id number]    Trauma Alert: Other Level C   Model of Arrival: Self    Trauma Team: Attending To and Residents [facility]  Consultants:     Neurosurgery: routine consult; Epic consult order placed; Assessment/Plan   Active Problems / Assessment:   Fall  Rule out subdural hematoma     Plan:   -Neurosurgery consult  - Repeat CTh  - As needed pain control  - Geriatric medicine consult  - PT/OT    History of Present Illness     Chief Complaint: Fall  Mechanism:Fall     HPI:    Mele Monge is a 65 y o  female with chronic bilateral shoulder dislocations with reverse shoulder arthroplasty, severe arthritis and osteoporosis, who presents s/p fall patient lives in a home with either elderly residents, where she is staying frequent falls  Per chart review she is documented to have fallen at least 3 times in the past 24 hours  Patient states she was in the kitchen when she turned around and lost her balance causing her to fall backwards  She endorses new onset head pain, but states that her chest back and shoulder pain are all at baseline  CT scan performed showed a tiny focus of hyperdensity along the right paramedian posterior falx with inability to rule out bleed, as well as bilateral reverse shoulder arthroplasties, both of which appear to be dislocated  Review of Systems   Constitutional: Positive for appetite change  HENT: Negative  Eyes: Negative  Respiratory: Negative  Cardiovascular: Negative  Gastrointestinal: Negative  Genitourinary: Negative  Musculoskeletal: Positive for back pain and joint swelling  Skin: Negative  Neurological: Negative for dizziness  Hematological: Negative  12-point, complete review of systems was reviewed and negative except as stated above       Historical Information     Past Medical History:   Diagnosis Date   • Acute blood loss anemia 9/9/2020   • Aftercare following joint replacement 9/9/2020    Patient had right reversed total shoulder arthroplasty   Pain was controlled when he left the hospital      • Anxiety    • Arthritis    • Depression    • Disease of thyroid gland     HYPO   • Femur neck fracture (HCC)    • GERD (gastroesophageal reflux disease)    • Hyperthyroidism     RESOLVED: [id number]   • Osteoporosis    • Polio    • PVD (peripheral vascular disease) ([facility] [address] )    • Right BBB/left ant fasc block    • UTI (urinary tract infection)    • Varicella infection     LAST ASSESSED: [id number]     Past Surgical History:   Procedure Laterality Date   • APPENDECTOMY     • HIP ARTHROPLASTY Left 11/27/2017    Procedure: REMOVAL IM NAIL CONVERSION TO TOTAL HIP ARTHROPLASTY POSTERIOR APPROACH;  Surgeon: Dar Whipple MD;  [facility]: [facility];  Service: Orthopedics   • HIP FRACTURE SURGERY     • HIP SURGERY      both hips replaced;2013 left ,2014 right   • JOINT REPLACEMENT Right     HIP TOTAL   • MD ARTHROPLASTY GLENOHUMERAL JOINT TOTAL SHOULDER Right 9/2/2020    Procedure: ARTHROPLASTY SHOULDER REVERSE;  Surgeon: Mary Gan MD;  [facility];  Service: Orthopedics   • MD ARTHROPLASTY GLENOHUMERAL JOINT TOTAL SHOULDER Left 6/1/2021    Procedure: ARTHROPLASTY SHOULDER REVERSE;  Surgeon: Mary Gan MD;  [facility];  Service: Orthopedics   • MD CONV PREV HIP TOT HIP ARTHRP W/WO AGRFT/ALGRFT Left 10/4/2017    Procedure: Hareware removal of left hip;  Surgeon: Dar Whipple MD;  Location[facility];  Service: Orthopedics   •  [address] SURG W/RLS TRANSVRS CARPL LIGM Right 5/24/2022    Procedure: RELEASE CARPAL TUNNEL ENDOSCOPIC-right;  Surgeon: Flaquito Juan MD;  [facility];  Service: Orthopedics   • REVISION TOTAL HIP ARTHROPLASTY Right    • TOE AMPUTATION Left 7/24/2022    Procedure: 5TH METATARSAL RESECTION WITH BOTTOM FLAP CLOSURE;  Surgeon: Rafaela Ng DPM;  [facility];  Service: Podiatry   • TONSILLECTOMY Social History     Tobacco Use   • Smoking status: Former   • Smokeless tobacco: Never   • Tobacco comments:     quit 20-30 years ago   Vaping Use   • Vaping Use: Never used   Substance Use Topics   • Alcohol use: Not Currently   • Drug use: Not Currently     Immunization History   Administered Date(s) Administered   • COVID-19 PFIZER VACCINE 0 3 ML IM 04/09/2021, 05/04/2021, 04/16/2022   • COVID-19 Pfizer vac (Joseph-sucrose, gray cap) 12 yr+ IM 04/16/2022   • DTP 10/27/2010   • INFLUENZA 10/11/2015, 09/23/2016, 09/21/2018   • Influenza, high dose seasonal 0 7 mL 10/20/2021, 11/21/2022   • Influenza, seasonal, injectable 10/27/2010, 09/23/2017, 10/20/2021   • Pneumococcal Conjugate 13-Valent 05/16/2017, 09/23/2017   • Pneumococcal Polysaccharide PPV23 11/03/2004, 09/28/2018   • Tdap 03/11/2016, 12/26/2021   • Tuberculin Skin Test 08/03/2022   • Zoster 01/07/2021   • Zoster Vaccine Recombinant 10/20/2020, 01/07/2021   • influenza, trivalent, adjuvanted 09/21/2018, 10/05/2019     Last Tetanus: Unknown  Family History: Non-contributory    1  Before the illness or injury that brought you to the Emergency, did you need someone to help you on a regular basis? 1=Yes   2  Since the illness or injury that brought you to the Emergency, have you needed more help than usual to take care of yourself? 1=Yes   3  Have you been hospitalized for one or more nights during the past 6 months (excluding a stay in the Emergency Department)? 1=Yes   4  In general, do you see well? 0=Yes   5  In general, do you have serious problems with your memory? 1=Yes   6  Do you take more than three different medications everyday?  1=Yes   TOTAL   5     Did you order a geriatric consult if the score was 2 or greater?: yes     Meds/Allergies   current meds:   Current Facility-Administered Medications   Medication Dose Route Frequency   • acetaminophen (TYLENOL) tablet 650 mg  650 mg Oral Q4H PRN   • HYDROmorphone HCl (DILAUDID) injection 0 2 mg  0 2 mg Intravenous Q4H PRN   • ondansetron (ZOFRAN) injection 4 mg  4 mg Intravenous Q6H PRN   • oxyCODONE (ROXICODONE) IR tablet 2 5 mg  2 5 mg Oral Q4H PRN   • oxyCODONE (ROXICODONE) IR tablet 5 mg  5 mg Oral Q4H PRN      No Known Allergies    Objective   Initial Vitals:   Temperature: (!) 97 1 °F (36 2 °C) (02/19/23 0109)  Pulse: 102 (02/19/23 0238)  Respirations: 18 (02/19/23 0109)  Blood Pressure: (!) 185/82 (02/19/23 0109)    Primary Survey:   Airway:        Status: patent; Hospital Interventions: none  Breathing:               Effort: normal       Right breath sounds: normal       Left breath sounds: normal  Circulation:        Rhythm: regular       Rate: regular   Right Pulses Left Pulses    R radial: 2+  R femoral: 2+       L radial: 2+  L femoral: 2+         Disability:        GCS: Eye: 4; Verbal: 5 Motor: 6 Total: 15       Right Pupil: round;  reactive         Left Pupil:  round;  reactive      R Motor Strength L Motor Strength    R : 5/5  R dorsiflex: 5/5  R plantarflex: 5/5 L : 5/5  L dorsiflex: 5/5  L plantarflex: 5/5        Sensory:  No sensory deficit  Exposure:       Completed: Yes      Secondary Survey:  Physical Exam  Vitals reviewed  Constitutional:       Comments: Patient is frail and cachectic   HENT:      Head: Normocephalic  Mouth/Throat:      Mouth: Mucous membranes are dry  Eyes:      Pupils: Pupils are equal, round, and reactive to light  Cardiovascular:      Rate and Rhythm: Normal rate  Pulmonary:      Effort: Pulmonary effort is normal    Abdominal:      General: Abdomen is flat  There is no distension  Palpations: Abdomen is soft  Musculoskeletal:      Cervical back: Normal range of motion and neck supple  Comments: Bilateral shoulders appear to be anteriorly dislocated  Patient's shoulders are tender  Also feels tenderness along right rib cage  Skin:     General: Skin is warm        Capillary Refill: Capillary refill takes less than 2 seconds  Neurological:      General: No focal deficit present  Mental Status: She is oriented to person, place, and time  Invasive Devices     None               Lab Results: Results: I have personally reviewed all pertinent laboratory/tests results,     Imaging Results: I have personally reviewed pertinent reports  CT head without contrast    Result Date: 2/19/2023  Impression: There is a tiny focus of hyperdensity along the right paramedian posterior falx  Although this may be related to volume averaging with a calcification, a tiny focus of hemorrhage cannot be entirely excluded  Please see detailed discussion above  Short-term follow-up is recommended  The  image demonstrates bilateral reverse shoulder arthroplasties, both of which appear to be dislocated  Clinical correlation is recommended  If further evaluation is necessary, plain film imaging could be performed  I personally discussed this study with Ziyad Yuen on 2/19/2023 at 4:31 AM  Workstation performed: [id number]     CT spine cervical without contrast    Result Date: 2/19/2023  Impression: No acute cervical spine fracture or traumatic malalignment  The  image demonstrates bilateral reverse shoulder arthroplasties, both of which appear to be dislocated  Clinical correlation is recommended  If further evaluation is indicated, plain film imaging could be performed  I personally discussed this study with Ziyad Yuen on 2/19/2023 at 4:47 AM   Workstation performed: [id number]         Code Status: Level 1 - Full Code  Advance Directive and Living Will:      Power of :    POLST:    I have spent 30 minutes with Patient  today in which greater than 50% of this time was spent in counseling/coordination of care regarding Instructions for management

## 2023-02-19 NOTE — ED PROVIDER NOTES
History  Chief Complaint   Patient presents with   • Fall     Per ems pt has fallen 3 times in past 24hours  Per ms pt comes from a home with other elderly people and no one is able to care for themselves or others  Pt c/o right shoulder pain  Pt reports hitting her head  Denies loc     19-year-old woman with ambulatory dysfunction, restless legs, and chronic bilateral shoulder dislocations presents with multiple falls  As per patient, she fell 3 times today  She claims all of these falls are secondary to ambulatory dysfunction and poor balance  During the last fall, she hit her right shoulder and right head  She needed to call for help as she was unable to get up  Otherwise, patient has been in her normal state of health and has no complaints  At this time she is reporting right shoulder pain and right head pain  She has chronically dislocated shoulders bilaterally  Prior to Admission Medications   Prescriptions Last Dose Informant Patient Reported? Taking?    Ascorbic Acid, Vitamin C, (VITAMIN C) 100 MG tablet   Yes No   Sig: Take 400 mg by mouth daily   MELATONIN PO   Yes No   Sig: Take by mouth   Multiple Vitamins-Minerals (CENTRUM SILVER PO)   Yes No   Sig: Take 1 tablet by mouth daily   OXYCODONE HCL PO   Yes No   Sig: Take 5 mg by mouth if needed (pain)   acetaminophen (TYLENOL) 650 mg CR tablet   No No   Sig: Take 1 tablet (650 mg total) by mouth every 8 (eight) hours as needed for mild pain   ferrous gluconate (FERGON) 324 mg tablet   No No   Sig: Take 1 tablet (324 mg total) by mouth in the morning   folic acid (FOLVITE) 1 mg tablet   Yes No   Sig: Take 2,000 mcg by mouth daily   hydroxychloroquine (PLAQUENIL) 200 mg tablet   Yes No   Si pill am 1/2 pill pm   levothyroxine 50 mcg tablet   No No   Sig: Take 1 tablet (50 mcg total) by mouth daily in the early morning   meloxicam (Mobic) 7 5 mg tablet   No No   Sig: Take 1 tablet (7 5 mg total) by mouth daily   methocarbamol (ROBAXIN) 750 mg tablet   No No   Sig: Take 1 tablet (750 mg total) by mouth 2 (two) times a day as needed for muscle spasms   methotrexate 2 5 MG tablet   No No   Sig: 3 tablets by mouth once a week on Thursday   nortriptyline (PAMELOR) 50 mg capsule   No No   Sig: Take 2 capsules (100 mg total) by mouth daily at bedtime   predniSONE 5 mg tablet   Yes No   Sig: Take 5 mg by mouth daily   rOPINIRole (REQUIP) 2 mg tablet   No No   Sig: Take 1 tablet (2 mg total) by mouth daily at bedtime   senna-docusate sodium (SENOKOT S) 8 6-50 mg per tablet   No No   Sig: Take 1 tablet by mouth 2 (two) times a day      Facility-Administered Medications: None       Past Medical History:   Diagnosis Date   • Acute blood loss anemia 9/9/2020   • Aftercare following joint replacement 9/9/2020    Patient had right reversed total shoulder arthroplasty   Pain was controlled when he left the hospital      • Anxiety    • Arthritis    • Depression    • Disease of thyroid gland     HYPO   • Femur neck fracture (HCC)    • GERD (gastroesophageal reflux disease)    • Hyperthyroidism     RESOLVED: [id number]   • Osteoporosis    • Polio    • PVD (peripheral vascular disease) ([facility] [address] )    • Right BBB/left ant fasc block    • UTI (urinary tract infection)    • Varicella infection     LAST ASSESSED: [id number]       Past Surgical History:   Procedure Laterality Date   • APPENDECTOMY     • HIP ARTHROPLASTY Left 11/27/2017    Procedure: REMOVAL IM NAIL CONVERSION TO TOTAL HIP ARTHROPLASTY POSTERIOR APPROACH;  Surgeon: Alexia Wright MD;  [facility];  Service: Orthopedics   • HIP FRACTURE SURGERY     • HIP SURGERY      both hips replaced;2013 left ,2014 right   • JOINT REPLACEMENT Right     HIP TOTAL   • IA ARTHROPLASTY GLENOHUMERAL JOINT TOTAL SHOULDER Right 9/2/2020    Procedure: ARTHROPLASTY SHOULDER REVERSE;  Surgeon: Neris Ventura MD;  [facility];  Service: Orthopedics   • IA ARTHROPLASTY GLENOHUMERAL JOINT TOTAL SHOULDER Left 6/1/2021 Procedure: ARTHROPLASTY SHOULDER REVERSE;  Surgeon: Pam High MD;  [facility];  Service: Orthopedics   • MO CONV PREV HIP TOT HIP ARTHRP W/WO AGRFT/ALGRFT Left 10/4/2017    Procedure: Katheryn Crigler removal of left hip;  Surgeon: Julia Aldridge MD;  [facility];  Service: Orthopedics   • MO [address] WRST SURG W/RLS TRANSVRS CARPL LIGM Right 5/24/2022    Procedure: RELEASE CARPAL TUNNEL ENDOSCOPIC-right;  Surgeon: Sybil Burkett MD;  [facility];  Service: Orthopedics   • REVISION TOTAL HIP ARTHROPLASTY Right    • TOE AMPUTATION Left 7/24/2022    Procedure: 5TH METATARSAL RESECTION WITH BOTTOM FLAP CLOSURE;  Surgeon: Jurgen Garza DPM;  Location: [facility];  Service: Podiatry   • TONSILLECTOMY         Family History   Problem Relation Age of Onset   • Rheum arthritis Mother    • Rheum arthritis Sister    • Prostate cancer Brother      I have reviewed and agree with the history as documented  E-Cigarette/Vaping   • E-Cigarette Use Never User      E-Cigarette/Vaping Substances   • Nicotine No    • THC No    • CBD No    • Flavoring No    • Other No    • Unknown No      Social History     Tobacco Use   • Smoking status: Former   • Smokeless tobacco: Never   • Tobacco comments:     quit 20-30 years ago   Vaping Use   • Vaping Use: Never used   Substance Use Topics   • Alcohol use: Not Currently   • Drug use: Not Currently        Review of Systems   Constitutional: Negative for chills and fever  HENT: Negative for ear pain and sore throat  Eyes: Negative for pain and visual disturbance  Respiratory: Negative for cough, shortness of breath and wheezing  Cardiovascular: Negative for chest pain and palpitations  Gastrointestinal: Negative for abdominal pain, constipation, diarrhea and vomiting  Genitourinary: Negative for dysuria, frequency, hematuria and vaginal bleeding  Musculoskeletal: Negative for arthralgias and back pain  Skin: Negative for color change and rash  Neurological: Positive for weakness  Negative for seizures, syncope and headaches  Psychiatric/Behavioral: Negative for agitation and confusion  Physical Exam  ED Triage Vitals   Temperature Pulse Respirations Blood Pressure SpO2   02/19/23 0109 02/19/23 0238 02/19/23 0109 02/19/23 0109 02/19/23 0238   (!) 97 1 °F (36 2 °C) 102 18 (!) 185/82 96 %      Temp Source Heart Rate Source Patient Position - Orthostatic VS BP Location FiO2 (%)   02/19/23 0109 02/19/23 0109 -- -- --   Oral Monitor         Pain Score       02/19/23 0234       6             Orthostatic Vital Signs  Vitals:    02/19/23 0238 02/19/23 0300 02/19/23 0500 02/19/23 0630   BP:  (!) 183/94  134/62   Pulse: 102 104 90        Physical Exam  Vitals and nursing note reviewed  Constitutional:       General: She is not in acute distress  Appearance: Normal appearance  She is well-developed  HENT:      Right Ear: External ear normal       Left Ear: External ear normal       Nose: Nose normal  No congestion  Neck:      Comments: Cervical spinal tenderness  Cardiovascular:      Rate and Rhythm: Normal rate and regular rhythm  Pulmonary:      Effort: Pulmonary effort is normal  No respiratory distress  Breath sounds: Normal breath sounds  Abdominal:      Palpations: Abdomen is soft  Tenderness: There is no abdominal tenderness  There is no guarding or rebound  Musculoskeletal:      Cervical back: Normal range of motion and neck supple  Comments: Bilateral shoulder pain with passive range of motion  Palpable dislocation of both shoulders  Skin:     General: Skin is warm and dry  Neurological:      Mental Status: She is alert and oriented to person, place, and time  Mental status is at baseline     Psychiatric:         Mood and Affect: Mood normal          Behavior: Behavior normal          ED Medications  Medications   ondansetron (ZOFRAN) injection 4 mg (has no administration in time range)   acetaminophen (TYLENOL) tablet 650 mg (has no administration in time range)   oxyCODONE (ROXICODONE) IR tablet 2 5 mg (has no administration in time range)   oxyCODONE (ROXICODONE) IR tablet 5 mg (has no administration in time range)   HYDROmorphone HCl (DILAUDID) injection 0 2 mg (has no administration in time range)   rOPINIRole (REQUIP) tablet 2 mg (2 mg Oral Given 2/19/23 0249)   methocarbamol (ROBAXIN) tablet 750 mg (750 mg Oral Given 2/19/23 0234)   ketorolac (TORADOL) injection 15 mg (15 mg Intramuscular Given 2/19/23 0234)       Diagnostic Studies  Results Reviewed     None                 CT head without contrast   Final Result by Sherita Mcfadden MD (02/19 4001)      There is a tiny focus of hyperdensity along the right paramedian posterior falx  Although this may be related to volume averaging with a calcification, a tiny focus of hemorrhage cannot be entirely excluded  Please see detailed discussion above  Short-term follow-up is recommended  The  image demonstrates bilateral reverse shoulder arthroplasties, both of which appear to be dislocated  Clinical correlation is recommended  If further evaluation is necessary, plain film imaging could be performed  I personally discussed this study with Argenis Walker on 2/19/2023 at 4:31 AM                            Workstation performed: [id number]         CT spine cervical without contrast   Final Result by Sherita Mcfadden MD (02/19 7911)      No acute cervical spine fracture or traumatic malalignment  The  image demonstrates bilateral reverse shoulder arthroplasties, both of which appear to be dislocated  Clinical correlation is recommended  If further evaluation is indicated, plain film imaging could be performed               I personally discussed this study with Argenis Walker on 2/19/2023 at 4:47 AM                       Workstation performed: [id number]         XR shoulder 2+ vw right   ED Interpretation by Heriberto Berg Obinna Coburn MD (02/19 9674)   Patient has known chronic dislocation  No acute osseous abnormality  Procedures  Procedures      ED Course                             SBIRT 20yo+    Flowsheet Row Most Recent Value   SBIRT (25 yo +)    In order to provide better care to our patients, we are screening all of our patients for alcohol and drug use  Would it be okay to ask you these screening questions? No Filed at: 02/19/2023 0119          Medical Decision Making  Presents with multiple falls secondary to ambulatory dysfunction  Patient reports she has chronic bilateral shoulder dislocations  She is reporting worsening right shoulder pain  Will image head, C-spine, and right shoulder  Radiology called to inform of possible subdural hematoma  Patient is neurologically intact with GCS 15  Discussed with trauma team who will admit her for further observation  Patient in agreement with plan and questions were answered  Verbalized understanding of return precautions  Portions or all of this note were generated using voice recognition software  Occasional wrong word or "sound a like" substitutions may have occurred due to the inherent limitations of voice recognition software  Please interpret any errors within the intended context of the whole sentence or idea  Amount and/or Complexity of Data Reviewed  Radiology: ordered and independent interpretation performed  Risk  Prescription drug management  Disposition  Final diagnoses:   Chronic dislocation of shoulder   Fall at home, subsequent encounter   Subdural hematoma     Time reflects when diagnosis was documented in both MDM as applicable and the Disposition within this note     Time User Action Codes Description Comment    2/19/2023  6:25 AM Iqra Mccall Add [S06  5XAA] SDH (subdural hematoma)     2/19/2023  6:25 AM Iqra Mccall Add [N00 281C] Dislocation of right shoulder joint, initial encounter     2/19/2023  6:25 AM Nadya Fordler Add [M81 0] Age-related osteoporosis without current pathological fracture     2/19/2023  6:27 AM Nadya Smith Add [G99  XXXD] Fall, subsequent encounter     2/19/2023  6:51 AM Garnell Nissen Add [N81 146] Chronic dislocation of shoulder     2/19/2023  6:51 AM Garnell Nissen Add [W19  XXXD,  G60 926] Fall at home, subsequent encounter     2/19/2023  6:51 AM Garnell Nissen Add [S06  5XAA] Subdural hematoma     2/19/2023  6:51 AM Garnell Nissen Modify [B31  XXXD] Fall, subsequent encounter     2/19/2023  6:51 AM Garnell Nissen Modify [I69 844] Chronic dislocation of shoulder       ED Disposition     ED Disposition   Admit    Condition   Stable    Date/Time   Sun Feb 19, 2023  6:51 AM    Comment   Case was discussed with trauma and the patient's admission status was agreed to be Admission Status: observation status to the service of Dr  To            Follow-up Information    None         Patient's Medications   Discharge Prescriptions    No medications on file     No discharge procedures on file  PDMP Review       Value Time User    PDMP Reviewed  Yes 7/22/2022 12:00 AM Dayanara Trotter           ED Provider  Attending physically available and evaluated Geetha Rivera I managed the patient along with the ED Attending      Electronically Signed by         Jaison Pascal MD  02/19/23 0915

## 2023-02-19 NOTE — ED NOTES
Pt total feed- assisted with breakfast tray at this time  Pt at 100% of food and fluids  Primary nurse aware of same       Braden MARYAM Castaneda  02/19/23 1412

## 2023-02-19 NOTE — ASSESSMENT & PLAN NOTE
· Pt presented s/p fall  CTH showed tiny focus of hyperdensity along right paramedian posterior falx concerning for small cortical contusion/SDH  · Imaging reviewed personally and by attending  Final results as below  · CT head wo 2/19/23: There is a tiny focus of hyperdensity along the right paramedian posterior falx concerning for a tiny acute hemorrhagic cortical contusion or tiny parafalcine subdural hemorrhage vs calcification  Plan  · Continue frequent neurological checks  · STAT CT head with any neurological decline or a decrease in GCS of 2pts within 1 hr   · Recommend SBP <160 mmHg  · Keppra for seizure ppx per primary team   · Hold/ avoid all antiplatelet and anticoagulation medications  No blood thinners prior to admission reported  · Pain control per primary team  · Mobilize and eval by PT/OT when able to  · DVT PPX: SCDs only at this time  Would recommend  obtaining a stable CT head wo prior to initiation of pharmacological DVT PPX  · Ongoing medical management per primary team   · No neurosurgical intervention is anticipated at this time  · Neurosurgery will review repeat CTH when completed  Please call with any questions/concerns

## 2023-02-19 NOTE — CONSULTS
[address] 1945, 65 y o  female MRN: [id number]  Unit/Bed#: ED 28 Encounter: [id number]  Primary Care Provider: Ham Vaca MD   Date and time admitted to hospital: 2/19/2023  1:06 AM    Inpatient consult to Neurosurgery  Consult performed by: Lauri Gates PA-C  Consult ordered by: Roopa Metz MD          Concer for Cerebral contusion  Assessment & Plan  · Pt presented s/p fall  CTH showed tiny focus of hyperdensity along right paramedian posterior falx concerning for small cortical contusion/SDH  · Imaging reviewed personally and by attending  Final results as below  · CT head wo 2/19/23: There is a tiny focus of hyperdensity along the right paramedian posterior falx concerning for a tiny acute hemorrhagic cortical contusion or tiny parafalcine subdural hemorrhage vs calcification  Plan  · Continue frequent neurological checks  · STAT CT head with any neurological decline or a decrease in GCS of 2pts within 1 hr   · Recommend SBP <160 mmHg  · Keppra for seizure ppx per primary team   · Hold/ avoid all antiplatelet and anticoagulation medications  No blood thinners prior to admission reported  · Pain control per primary team  · Mobilize and eval by PT/OT when able to  · DVT PPX: SCDs only at this time  Would recommend  obtaining a stable CT head wo prior to initiation of pharmacological DVT PPX  · Ongoing medical management per primary team   · No neurosurgical intervention is anticipated at this time  · Neurosurgery will review repeat CTH when completed  Please call with any questions/concerns  Attending discussed patient and reviewed images during morning rounds on 2/19/23 at 7 am  Patient personally accessed and examined on 2/19/23 at 10 am    History of Present Illness   History, ROS and PFSH obtained from pt and chart review     HPI: Shyanne Storm is a 65 y o  female with PMH of chronic right shoulder dislocation, bilateral should arthroplasty, Osteoporosis, hx of femur neck fx, hx of toe amputation, PVD, Right BBB who presented to the ED s/p fall  CTH showed hyperdensity concerning for small focal hemorrhagic contusion vs calcification for which neurosurgery was consulted  Pt reports worsening pain in her shoulder since the fall  She also reports chronic pain in BLE distally around her feet and ankles where she has redness  Pt denies any new numbness in BUE/BLE  She denies any new weakness  She reports chronic limited ROM of bilateral shoulders due to shoulder pain and dislocation particularly of the right shoulder  She denies dizziness, lightheadedness, nausea or vomiting  Review of Systems   Constitutional: Negative for chills and fever  HENT: Negative for hearing loss  Eyes: Negative for pain and visual disturbance  Respiratory: Negative for chest tightness and shortness of breath  Cardiovascular: Negative for chest pain and palpitations  Gastrointestinal: Negative for abdominal pain, nausea and vomiting  Genitourinary: Negative for dysuria  Musculoskeletal: Positive for gait problem  Bilateral shoulder pain R > L  Pain in bilateral LE distally   Skin: Negative for pallor and rash  Neurological: Negative for dizziness and light-headedness  Psychiatric/Behavioral: Negative for agitation, behavioral problems, confusion and decreased concentration  Historical Information   Past Medical History:   Diagnosis Date   • Acute blood loss anemia 9/9/2020   • Aftercare following joint replacement 9/9/2020    Patient had right reversed total shoulder arthroplasty   Pain was controlled when he left the hospital      • Anxiety    • Arthritis    • Depression    • Disease of thyroid gland     HYPO   • Femur neck fracture (HCC)    • GERD (gastroesophageal reflux disease)    • Hyperthyroidism     RESOLVED: [id number]   • Osteoporosis    • Polio    • PVD [facility]peripheral [facility]) [facility] ) • Right BBB/left ant fasc block    • UTI (urinary tract infection)    • Varicella infection     LAST ASSESSED: [id number]     Past Surgical History:   Procedure Laterality Date   • APPENDECTOMY     • HIP ARTHROPLASTY Left 11/27/2017    Procedure: REMOVAL IM NAIL CONVERSION TO TOTAL HIP ARTHROPLASTY POSTERIOR APPROACH;  Surgeon: Boris Flores MD;  [facility];  Service: Orthopedics   • HIP FRACTURE SURGERY     • HIP SURGERY      both hips replaced;2013 left ,2014 right   • JOINT REPLACEMENT Right     HIP TOTAL   • AK ARTHROPLASTY GLENOHUMERAL JOINT TOTAL SHOULDER Right 9/2/2020    Procedure: ARTHROPLASTY SHOULDER REVERSE;  Surgeon: Clyde Kumari MD;  [facility];  Service: Orthopedics   • AK ARTHROPLASTY GLENOHUMERAL JOINT TOTAL SHOULDER Left 6/1/2021    Procedure: ARTHROPLASTY SHOULDER REVERSE;  Surgeon: Clyde Kumari MD;  [facility];  Service: Orthopedics   • AK CONV PREV HIP TOT HIP ARTHRP W/WO AGRFT/ALGRFT Left 10/4/2017    Procedure: Hareware removal of left hip;  Surgeon: Boris Flores MD;  [facility];  Service: Orthopedics   • AK [address] WRST SURG W/RLS TRANSVRS CARPL LIGM Right 5/24/2022    Procedure: RELEASE CARPAL TUNNEL ENDOSCOPIC-right;  Surgeon: Nydia Osborne MD;  [facility];  Service: Orthopedics   • REVISION TOTAL HIP ARTHROPLASTY Right    • TOE AMPUTATION Left 7/24/2022    Procedure: 5TH METATARSAL RESECTION WITH BOTTOM FLAP CLOSURE;  Surgeon: Mejia Steen DPM;  [facility];  Service: Podiatry   • TONSILLECTOMY       Social History     Substance and Sexual Activity   Alcohol Use Not Currently     Social History     Substance and Sexual Activity   Drug Use Not Currently     Social History     Tobacco Use   Smoking Status Former   Smokeless Tobacco Never   Tobacco Comments    quit 20-30 years ago     Family History   Problem Relation Age of Onset   • Rheum arthritis Mother    • Rheum arthritis Sister    • Prostate cancer Brother Meds/Allergies   all current active meds have been reviewed, current meds:   Current Facility-Administered Medications   Medication Dose Route Frequency   • acetaminophen (TYLENOL) tablet 650 mg  650 mg Oral Q4H PRN   • HYDROmorphone HCl (DILAUDID) injection 0 2 mg  0 2 mg Intravenous Q4H PRN   • ondansetron (ZOFRAN) injection 4 mg  4 mg Intravenous Q6H PRN   • oxyCODONE (ROXICODONE) IR tablet 2 5 mg  2 5 mg Oral Q4H PRN   • oxyCODONE (ROXICODONE) IR tablet 5 mg  5 mg Oral Q4H PRN    and PTA meds:   Prior to Admission Medications   Prescriptions Last Dose Informant Patient Reported? Taking?    Ascorbic Acid, Vitamin C, (VITAMIN C) 100 MG tablet   Yes No   Sig: Take 400 mg by mouth daily   MELATONIN PO   Yes No   Sig: Take by mouth   Multiple Vitamins-Minerals (CENTRUM SILVER PO)   Yes No   Sig: Take 1 tablet by mouth daily   OXYCODONE HCL PO   Yes No   Sig: Take 5 mg by mouth if needed (pain)   acetaminophen (TYLENOL) 650 mg CR tablet   No No   Sig: Take 1 tablet (650 mg total) by mouth every 8 (eight) hours as needed for mild pain   ferrous gluconate (FERGON) 324 mg tablet   No No   Sig: Take 1 tablet (324 mg total) by mouth in the morning   folic acid (FOLVITE) 1 mg tablet   Yes No   Sig: Take 2,000 mcg by mouth daily   hydroxychloroquine (PLAQUENIL) 200 mg tablet   Yes No   Si pill am 1/2 pill pm   levothyroxine 50 mcg tablet   No No   Sig: Take 1 tablet (50 mcg total) by mouth daily in the early morning   meloxicam (Mobic) 7 5 mg tablet   No No   Sig: Take 1 tablet (7 5 mg total) by mouth daily   methocarbamol (ROBAXIN) 750 mg tablet   No No   Sig: Take 1 tablet (750 mg total) by mouth 2 (two) times a day as needed for muscle spasms   methotrexate 2 5 MG tablet   No No   Sig: 3 tablets by mouth once a week on Thursday   nortriptyline (PAMELOR) 50 mg capsule   No No   Sig: Take 2 capsules (100 mg total) by mouth daily at bedtime   predniSONE 5 mg tablet   Yes No   Sig: Take 5 mg by mouth daily rOPINIRole (REQUIP) 2 mg tablet   No No   Sig: Take 1 tablet (2 mg total) by mouth daily at bedtime   senna-docusate sodium (SENOKOT S) 8 6-50 mg per tablet   No No   Sig: Take 1 tablet by mouth 2 (two) times a day      Facility-Administered Medications: None     No Known Allergies    Objective   I/O     None          Physical Exam  Constitutional:       General: She is not in acute distress  Appearance: She is well-developed  HENT:      Head: Normocephalic  Eyes:      Pupils: Pupils are equal, round, and reactive to light  Neck:      Trachea: No tracheal deviation  Cardiovascular:      Rate and Rhythm: Normal rate  Pulmonary:      Effort: Pulmonary effort is normal    Abdominal:      Palpations: Abdomen is soft  Tenderness: There is no abdominal tenderness  There is no guarding  Musculoskeletal:      Cervical back: Neck supple  Skin:     General: Skin is warm and dry  Coloration: Skin is not pale  Findings: No rash  Neurological:      Mental Status: She is alert and oriented to person, place, and time  Comments: GCS 15, Awake, Alert, Oriented x 3    Motor: ORNELAS, Right shoulder dislocation  RUE IO 4/5  LUE SF 3-4/5, EF/IO 4 to 4+/5  Sensation:  intact to LT X 4     Coordination in BUE:Not tested given shoulder dislocation and pain  Psychiatric:         Behavior: Behavior normal        Neurologic Exam     Mental Status   Oriented to person, place, and time  Cranial Nerves     CN III, IV, VI   Pupils are equal, round, and reactive to light  Vitals:Blood pressure 144/65, pulse 82, temperature (!) 97 1 °F (36 2 °C), temperature source Oral, resp  rate 16, SpO2 100 %, currently breastfeeding  ,There is no height or weight on file to calculate BMI       Lab Results:   Results from last 7 days   Lab Units 02/19/23  0747   WBC Thousand/uL 8 39   HEMOGLOBIN g/dL 9 6*   HEMATOCRIT % 30 5*   PLATELETS Thousands/uL 344   NEUTROS PCT % 72   MONOS PCT % 14*     Results from last 7 days   Lab Units 02/19/23  0747 02/16/23  1020   POTASSIUM mmol/L 4 4 4 1   CHLORIDE mmol/L 108 104   CO2 mmol/L 20* 29   BUN mg/dL 17 23   CREATININE mg/dL 0 56* 0 46*   CALCIUM mg/dL 9 0 9 1                    Imaging Studies: I have personally reviewed pertinent reports  and I have personally reviewed pertinent films in PACS    EKG, Pathology, and Other Studies: I have personally reviewed pertinent reports  VTE Prophylaxis: Sequential compression device Mónica Adas)     Code Status: Level 1 - Full Code  Advance Directive and Living Will:      Power of :    POLST:      Counseling / Coordination of Care  I spent 45 minutes with the patient  PLEASE NOTE:  This encounter may have been completed utilizing the M- Modal/TSB Direct Speech Voice Recognition Software  Grammatical errors, random word insertions, pronoun errors and incomplete sentences are occasional consequences of the system due to software limitations, ambient noise and hardware issues  These may be missed even after proof reading prior to affixing electronic signature  Please do not hesitate to contact me directly if you have any questions or concerns about the content, text or information contained within the body of this dictation

## 2023-02-20 RX ORDER — ENOXAPARIN SODIUM 100 MG/ML
40 INJECTION SUBCUTANEOUS
Status: DISCONTINUED | OUTPATIENT
Start: 2023-02-20 | End: 2023-02-22 | Stop reason: HOSPADM

## 2023-02-20 RX ORDER — SENNOSIDES 8.6 MG
1 TABLET ORAL
Status: DISCONTINUED | OUTPATIENT
Start: 2023-02-20 | End: 2023-02-22 | Stop reason: HOSPADM

## 2023-02-20 RX ORDER — METHOCARBAMOL 500 MG/1
500 TABLET, FILM COATED ORAL EVERY 6 HOURS PRN
Status: DISCONTINUED | OUTPATIENT
Start: 2023-02-20 | End: 2023-02-22 | Stop reason: HOSPADM

## 2023-02-20 RX ADMIN — OXYCODONE HYDROCHLORIDE 5 MG: 5 TABLET ORAL at 18:08

## 2023-02-20 RX ADMIN — OXYCODONE HYDROCHLORIDE 5 MG: 5 TABLET ORAL at 13:21

## 2023-02-20 RX ADMIN — METHOCARBAMOL 500 MG: 500 TABLET ORAL at 10:19

## 2023-02-20 RX ADMIN — OXYCODONE HYDROCHLORIDE 5 MG: 5 TABLET ORAL at 09:16

## 2023-02-20 RX ADMIN — METHOCARBAMOL 500 MG: 500 TABLET ORAL at 20:05

## 2023-02-20 RX ADMIN — ENOXAPARIN SODIUM 40 MG: 40 INJECTION SUBCUTANEOUS at 13:22

## 2023-02-20 RX ADMIN — HYDROMORPHONE HYDROCHLORIDE 0.2 MG: 0.2 INJECTION, SOLUTION INTRAMUSCULAR; INTRAVENOUS; SUBCUTANEOUS at 16:47

## 2023-02-20 RX ADMIN — OXYCODONE HYDROCHLORIDE 5 MG: 5 TABLET ORAL at 22:13

## 2023-02-20 NOTE — RESTORATIVE TECHNICIAN NOTE
Restorative Technician Note      Patient Name: Ronda Streeter     Restorative Tech Visit Date: 02/20/23  Note Type: Mobility  Patient Position Upon Consult: Bedside chair  Activity Performed: Repositioned  Patient Position at End of Consult: Bedside chair;  All needs within reach; Bed/Chair alarm activated    Salo Chow, Restorative Technician

## 2023-02-20 NOTE — CASE MANAGEMENT
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[facility] ) 10/14/2022   • Non-healing open wound of heel, initial encounter 10/12/2022   • Pain 2022   • Anemia 2022   • Pressure injury of left foot, stage 4 (HCC)    • Hyponatremia 2022   • Venous insufficiency 2021   • Shoulder pain, bilateral 2021   • Failed orthopedic implant ([facility] ) 2021   • S/P reverse total shoulder arthroplasty, left 2021   • Rupture long head biceps tendon, left, initial encounter 2021   • Shoulder dislocation 2021   • Depression 2020   • Other forms of systemic lupus erythematosus ([facility] ) 2020   • Dyspepsia 2019   • Vitamin D deficiency 2019   • Chronic pain syndrome 2019   • Rheumatoid arthritis involving multiple sites with positive rheumatoid factor ([facility] ) 2019   • Ambulatory dysfunction 2018   • Closed compression fracture of L3 lumbar vertebra 2018   • S/P total hip arthroplasty 2017   • Anxiety 2017   • RLS (restless legs syndrome) 2017   • RBBB 2017   • Age-related osteoporosis without current pathological fracture 2017   • Hypothyroidism due to Hashimoto's thyroiditis 2016      LOS (days): 1  Geometric Mean LOS (GMLOS) (days): 2 00  Days to GMLOS:0 8     OBJECTIVE:  PATIENT READMITTED TO HOSPITAL  Risk of Unplanned Readmission Score: 15 29         Current admission status: Inpatient       Preferred Pharmacy:   [address]  [facility]  Phone: [phone] Fax: [phone]    Primary Care Provider: Robyn Aguirre MD    Primary Insurance: MEDICARE  Secondary Insurance:     ASSESSMENT:  Jose Rosas Proxies    There are no active Health Care Proxies on file         Advance Directives  Does patient have a [address]?: No  Was patient offered paperwork?: Yes  Does patient currently have a Health Care decision maker?: Yes, please see Health Care Proxy section  Does patient have Advance Directives?: No  Was patient offered paperwork?: Yes  Primary Contact: Oxana Vaughan (niece) [phone]    Readmission Root Cause  30 Day Readmission: No    Patient Information  Admitted from[de-identified] Home  Mental Status: Alert, Confused  During Assessment patient was accompanied by: Not accompanied during assessment  Assessment information provided by[de-identified] Patient  Primary Caregiver: Self  Support Systems: Family members, [address] [facility] of Residence: [address] [facility] [address] [facility] [address] do you live in?: [place]  Type of Current Residence: 2 story home  Upon entering residence, is there a bedroom on the main floor (no further steps)?: Yes  Upon entering residence, is there a bathroom on the main floor (no further steps)?: Yes  In the last 12 months, was there a time when you were not able to pay the mortgage or rent on time?: No  In the last 12 months, how many places have you lived?: 1  In the last 12 months, was there a time when you did not have a steady place to sleep or slept in a shelter (including now)?: No  Homeless/housing insecurity resource given?: N/A  Living Arrangements: Lives w/ Extended Family  Is patient a ?: No    Activities of Daily Living Prior to Admission  Functional Status: Assistance  Completes ADLs independently?: No  Level of ADL dependence: Assistance  Ambulates independently?: Yes  Does patient use assisted devices?: Yes  Assisted Devices (DME) used: Iris Nicolas, [employer]  Does patient currently own DME?: Yes  What DME does the patient currently own?: Mirza Allen [employer]  Does patient have a history of Outpatient Therapy (PT/OT)?: Yes  Does the patient have a history of Short-Term Rehab?: Yes  Does patient have a history of HHC?: Yes  Does patient currently have [facility]?: No    Patient Information Continued  Does patient have prescription coverage?: Yes  Within the past 12 months, you worried that your food would run out before you got the money to buy more : Never true  Within the past 12 months, the food you bought just didn't last and you didn't have money to get more : Never true  Food insecurity resource given?: N/A  Does patient receive dialysis treatments?: No  Does patient have a history of substance abuse?: No  Does patient have a history of Mental Health Diagnosis?: No    Means of Transportation  Means of Transport to Appts[de-identified] Family transport  In the past 12 months, has lack of transportation kept you from medical appointments or from getting medications?: No  In the past 12 months, has lack of transportation kept you from meetings, work, or from getting things needed for daily living?: No  Was application for public transport provided?: N/A    DISCHARGE DETAILS:    Discharge planning discussed with[de-identified] Ambrocio petersen) [phone]  Freedom of Choice: Yes     CM contacted family/caregiver?: Yes  Were Treatment Team discharge recommendations reviewed with patient/caregiver?: Yes  Did patient/caregiver verbalize understanding of patient care needs?: N/A- going to facility  Were patient/caregiver advised of the risks associated with not following Treatment Team discharge recommendations?: Yes    Contacts  Patient Contacts: Ambrocio petersen) [phone]  Relationship to Patient[de-identified] Family  Contact Method: Phone  Phone Number: [phone]  Reason/Outcome: Continuity of Care, Discharge Planning    Requested [address]         Is the patient interested in [facility] at discharge?: No    DME Referral Provided  Referral made for DME?: No    Other Referral/Resources/Interventions Provided:  Interventions: Short Term Rehab, SNF    Treatment Team Recommendation: SNF, Short Term Rehab  Discharge Destination Plan[de-identified] Short Term Rehab, SNF    CM spoke to pt's niece, Rickey Chery, to discuss d/c planning  Pt was seen by OT/PT and recommended for IP rehab  Rickey Chery is in agreement with this plan  Her preference is [facility] or [address] [place] [address] Rd  CM placed referrals  Rickey Chery open to CM placing backup options as well  CM reviewed d/c planning process including the following: identifying help at home, patient preference for d/c planning needs, Discharge Lounge, [facility] Meds to Bed program, availability of treatment team to discuss questions or concerns patient and/or family may have regarding understanding medications and recognizing signs and symptoms once discharged  CM also encouraged patient to follow up with all recommended appointments after discharge  Patient advised of importance for patient and family to participate in managing patient’s medical well being

## 2023-02-20 NOTE — PLAN OF CARE
Problem: MOBILITY - ADULT  Goal: Maintain or return to baseline ADL function  Description: INTERVENTIONS:  -  Assess patient's ability to carry out ADLs; assess patient's baseline for ADL function and identify physical deficits which impact ability to perform ADLs (bathing, care of mouth/teeth, toileting, grooming, dressing, etc )  - Assess/evaluate cause of self-care deficits   - Assess range of motion  - Assess patient's mobility; develop plan if impaired  - Assess patient's need for assistive devices and provide as appropriate  - Encourage maximum independence but intervene and supervise when necessary  - Involve family in performance of ADLs  - Assess for home care needs following discharge   - Consider OT consult to assist with ADL evaluation and planning for discharge  - Provide patient education as appropriate  Outcome: Progressing  Goal: Maintains/Returns to pre admission functional level  Description: INTERVENTIONS:  - Perform BMAT or MOVE assessment daily    - Set and communicate daily mobility goal to care team and patient/family/caregiver  - Collaborate with rehabilitation services on mobility goals if consulted  - Perform Range of Motion 3 times a day  - Reposition patient every 2 hours    - Dangle patient 3 times a day  - Stand patient 3 times a day  - Ambulate patient 3 times a day  - Out of bed to chair 3 times a day   - Out of bed for meals 3 times a day  - Out of bed for toileting  - Record patient progress and toleration of activity level   Outcome: Progressing     Problem: PAIN - ADULT  Goal: Verbalizes/displays adequate comfort level or baseline comfort level  Description: Interventions:  - Encourage patient to monitor pain and request assistance  - Assess pain using appropriate pain scale  - Administer analgesics based on type and severity of pain and evaluate response  - Implement non-pharmacological measures as appropriate and evaluate response  - Consider cultural and social influences on pain and pain management  - Notify physician/advanced practitioner if interventions unsuccessful or patient reports new pain  Outcome: Progressing     Problem: SAFETY ADULT  Goal: Maintain or return to baseline ADL function  Description: INTERVENTIONS:  -  Assess patient's ability to carry out ADLs; assess patient's baseline for ADL function and identify physical deficits which impact ability to perform ADLs (bathing, care of mouth/teeth, toileting, grooming, dressing, etc )  - Assess/evaluate cause of self-care deficits   - Assess range of motion  - Assess patient's mobility; develop plan if impaired  - Assess patient's need for assistive devices and provide as appropriate  - Encourage maximum independence but intervene and supervise when necessary  - Involve family in performance of ADLs  - Assess for home care needs following discharge   - Consider OT consult to assist with ADL evaluation and planning for discharge  - Provide patient education as appropriate  Outcome: Progressing  Goal: Maintains/Returns to pre admission functional level  Description: INTERVENTIONS:  - Perform BMAT or MOVE assessment daily    - Set and communicate daily mobility goal to care team and patient/family/caregiver  - Collaborate with rehabilitation services on mobility goals if consulted  - Perform Range of Motion 3 times a day  - Reposition patient every 2 hours    - Dangle patient 3 times a day  - Stand patient 3 times a day  - Ambulate patient 3 times a day  - Out of bed to chair 3 times a day   - Out of bed for meals 3 times a day  - Out of bed for toileting  - Record patient progress and toleration of activity level   Outcome: Progressing  Goal: Patient will remain free of falls  Description: INTERVENTIONS:  - Educate patient/family on patient safety including physical limitations  - Instruct patient to call for assistance with activity   - Consult OT/PT to assist with strengthening/mobility   - Keep Call bell within reach  - Keep bed low and locked with side rails adjusted as appropriate  - Keep care items and personal belongings within reach  - Initiate and maintain comfort rounds  - Make Fall Risk Sign visible to staff  - Offer Toileting every 2 Hours, in advance of need  - Initiate/Maintain bed alarm  - Obtain necessary fall risk management equipment:   - Apply yellow socks and bracelet for high fall risk patients  - Consider moving patient to room near nurses station  Outcome: Progressing     Problem: DISCHARGE PLANNING  Goal: Discharge to home or other facility with appropriate resources  Description: INTERVENTIONS:  - Identify barriers to discharge w/patient and caregiver  - Arrange for needed discharge resources and transportation as appropriate  - Identify discharge learning needs (meds, wound care, etc )  - Arrange for interpretive services to assist at discharge as needed  - Refer to Case Management Department for coordinating discharge planning if the patient needs post-hospital services based on physician/advanced practitioner order or complex needs related to functional status, cognitive ability, or social support system  Outcome: Progressing     Problem: Knowledge Deficit  Goal: Patient/family/caregiver demonstrates understanding of disease process, treatment plan, medications, and discharge instructions  Description: Complete learning assessment and assess knowledge base    Interventions:  - Provide teaching at level of understanding  - Provide teaching via preferred learning methods  Outcome: Progressing     Problem: Prexisting or High Potential for Compromised Skin Integrity  Goal: Skin integrity is maintained or improved  Description: INTERVENTIONS:  - Identify patients at risk for skin breakdown  - Assess and monitor skin integrity  - Assess and monitor nutrition and hydration status  - Monitor labs   - Assess for incontinence   - Turn and reposition patient  - Assist with mobility/ambulation  - Relieve pressure over bony prominences  - Avoid friction and shearing  - Provide appropriate hygiene as needed including keeping skin clean and dry  - Evaluate need for skin moisturizer/barrier cream  - Collaborate with interdisciplinary team   - Patient/family teaching  - Consider wound care consult   Outcome: Progressing

## 2023-02-20 NOTE — PLAN OF CARE
Problem: MOBILITY - ADULT  Goal: Maintain or return to baseline ADL function  Description: INTERVENTIONS:  -  Assess patient's ability to carry out ADLs; assess patient's baseline for ADL function and identify physical deficits which impact ability to perform ADLs (bathing, care of mouth/teeth, toileting, grooming, dressing, etc )  - Assess/evaluate cause of self-care deficits   - Assess range of motion  - Assess patient's mobility; develop plan if impaired  - Assess patient's need for assistive devices and provide as appropriate  - Encourage maximum independence but intervene and supervise when necessary  - Involve family in performance of ADLs  - Assess for home care needs following discharge   - Consider OT consult to assist with ADL evaluation and planning for discharge  - Provide patient education as appropriate  Outcome: Progressing  Goal: Maintains/Returns to pre admission functional level  Description: INTERVENTIONS:  - Perform BMAT or MOVE assessment daily    - Set and communicate daily mobility goal to care team and patient/family/caregiver  - Collaborate with rehabilitation services on mobility goals if consulted  - Perform Range of Motion 3 times a day  - Reposition patient every 2 hours    - Dangle patient 3 times a day  - Stand patient 3 times a day  - Ambulate patient 3 times a day  - Out of bed to chair 3 times a day   - Out of bed for meals 3 times a day  - Out of bed for toileting  - Record patient progress and toleration of activity level   Outcome: Progressing     Problem: PAIN - ADULT  Goal: Verbalizes/displays adequate comfort level or baseline comfort level  Description: Interventions:  - Encourage patient to monitor pain and request assistance  - Assess pain using appropriate pain scale  - Administer analgesics based on type and severity of pain and evaluate response  - Implement non-pharmacological measures as appropriate and evaluate response  - Consider cultural and social influences on pain and pain management  - Notify physician/advanced practitioner if interventions unsuccessful or patient reports new pain  Outcome: Progressing     Problem: SAFETY ADULT  Goal: Maintain or return to baseline ADL function  Description: INTERVENTIONS:  -  Assess patient's ability to carry out ADLs; assess patient's baseline for ADL function and identify physical deficits which impact ability to perform ADLs (bathing, care of mouth/teeth, toileting, grooming, dressing, etc )  - Assess/evaluate cause of self-care deficits   - Assess range of motion  - Assess patient's mobility; develop plan if impaired  - Assess patient's need for assistive devices and provide as appropriate  - Encourage maximum independence but intervene and supervise when necessary  - Involve family in performance of ADLs  - Assess for home care needs following discharge   - Consider OT consult to assist with ADL evaluation and planning for discharge  - Provide patient education as appropriate  Outcome: Progressing  Goal: Maintains/Returns to pre admission functional level  Description: INTERVENTIONS:  - Perform BMAT or MOVE assessment daily    - Set and communicate daily mobility goal to care team and patient/family/caregiver  - Collaborate with rehabilitation services on mobility goals if consulted  - Perform Range of Motion 3 times a day  - Reposition patient every 2 hours    - Dangle patient 3 times a day  - Stand patient 3 times a day  - Ambulate patient 3 times a day  - Out of bed to chair 3 times a day   - Out of bed for meals 3 times a day  - Out of bed for toileting  - Record patient progress and toleration of activity level   Outcome: Progressing  Goal: Patient will remain free of falls  Description: INTERVENTIONS:  - Educate patient/family on patient safety including physical limitations  - Instruct patient to call for assistance with activity   - Consult OT/PT to assist with strengthening/mobility   - Keep Call bell within reach  - Keep bed low and locked with side rails adjusted as appropriate  - Keep care items and personal belongings within reach  - Initiate and maintain comfort rounds  - Make Fall Risk Sign visible to staff  - Offer Toileting every 2 Hours, in advance of need  - Initiate/Maintain bed alarm  - Obtain necessary fall risk management equipment:   - Apply yellow socks and bracelet for high fall risk patients  - Consider moving patient to room near nurses station  Outcome: Progressing     Problem: DISCHARGE PLANNING  Goal: Discharge to home or other facility with appropriate resources  Description: INTERVENTIONS:  - Identify barriers to discharge w/patient and caregiver  - Arrange for needed discharge resources and transportation as appropriate  - Identify discharge learning needs (meds, wound care, etc )  - Arrange for interpretive services to assist at discharge as needed  - Refer to Case Management Department for coordinating discharge planning if the patient needs post-hospital services based on physician/advanced practitioner order or complex needs related to functional status, cognitive ability, or social support system  Outcome: Progressing     Problem: Knowledge Deficit  Goal: Patient/family/caregiver demonstrates understanding of disease process, treatment plan, medications, and discharge instructions  Description: Complete learning assessment and assess knowledge base    Interventions:  - Provide teaching at level of understanding  - Provide teaching via preferred learning methods  Outcome: Progressing

## 2023-02-20 NOTE — TELEPHONE ENCOUNTER
Spoke to Ofelia Herrera, patient's niece and she states patient is currently admitted again for fall and shoulder pain  She will reach out for appt after discharge from rehab

## 2023-02-20 NOTE — PHYSICAL THERAPY NOTE
PHYSICAL THERAPY EVALUATION  NAME:  Joel Rivera  DATE: 02/20/23    AGE:   65 y o  Mrn:   [id number]  ADMIT DX:  Subdural hematoma [S06  5XAA]  SDH (subdural hematoma) [S06  5XAA]  Age-related osteoporosis without current pathological fracture [M81 0]  Dislocation of right shoulder joint, initial encounter [Z11 389X]  Fall, subsequent encounter [W19  XXXD]  Fall at home, subsequent encounter [W19  XXXD, A43 573]  Chronic dislocation of shoulder [M24 419]  Unspecified multiple injuries, initial encounter [T07  XXXA]    Past Medical History:   Diagnosis Date   • Acute blood loss anemia 9/9/2020   • Aftercare following joint replacement 9/9/2020    Patient had right reversed total shoulder arthroplasty   Pain was controlled when he left the hospital      • Anxiety    • Arthritis    • Depression    • Disease of thyroid gland     HYPO   • Femur neck fracture (HCC)    • GERD (gastroesophageal reflux disease)    • Hyperthyroidism     RESOLVED: [id number]   • Osteoporosis    • Polio    • PVD (peripheral vascular disease) ([facility] [address] )    • Right BBB/left ant fasc block    • UTI (urinary tract infection)    • Varicella infection     LAST ASSESSED: [id number]       Past Surgical History:   Procedure Laterality Date   • APPENDECTOMY     • HIP ARTHROPLASTY Left 11/27/2017    Procedure: REMOVAL IM NAIL CONVERSION TO TOTAL HIP ARTHROPLASTY POSTERIOR APPROACH;  Surgeon: Bigg Gerber MD;  [facility];  Service: Orthopedics   • HIP FRACTURE SURGERY     • HIP SURGERY      both hips replaced;2013 left ,2014 right   • JOINT REPLACEMENT Right     HIP TOTAL   • IL ARTHROPLASTY GLENOHUMERAL JOINT TOTAL SHOULDER Right 9/2/2020    Procedure: ARTHROPLASTY SHOULDER REVERSE;  Surgeon: Alton Morin MD;  [facility];  Service: Orthopedics   • IL ARTHROPLASTY GLENOHUMERAL JOINT TOTAL SHOULDER Left 6/1/2021    Procedure: ARTHROPLASTY SHOULDER REVERSE;  Surgeon: Alton Morin MD;  [facility];  Service: Orthopedics   • IL CONV PREV HIP TOT HIP ARTHRP W/WO AGRFT/ALGRFT Left 10/4/2017    Procedure: Hareware removal of left hip;  Surgeon: Bob Enciso MD;  [facility];  Service: Orthopedics   • [place] [address] WRST SURG W/RLS TRANSVRS CARPL LIGM Right 5/24/2022    Procedure: RELEASE CARPAL TUNNEL ENDOSCOPIC-right;  Surgeon: Becca Dejesus MD;  [facility];  Service: Orthopedics   • REVISION TOTAL HIP ARTHROPLASTY Right    • TOE AMPUTATION Left 7/24/2022    Procedure: 5TH METATARSAL RESECTION WITH BOTTOM FLAP CLOSURE;  Surgeon: French Holly DPM;  [facility];  Service: Podiatry   • TONSILLECTOMY         Length Of Stay: 1    PHYSICAL THERAPY EVALUATION:        02/20/23 0889   Note Type   Note type Evaluation   Pain Assessment   Pain Assessment Tool 0-10   Pain Score 7   Pain Location/Orientation Orientation: Bilateral;Location: Shoulder   Pain Onset/Description Onset: Ongoing;Frequency: Constant/Continuous; Descriptor: Aching   Effect of Pain on Daily Activities increased pain with activity   Patient's Stated Pain Goal No pain   Hospital Pain Intervention(s) Ambulation/increased activity;Repositioned   Restrictions/Precautions   Weight Bearing Precautions Per Order Yes   RUE Weight Bearing Per Order WBAT  (pre ortho in previous hospital admission notes)   LUE Weight Bearing Per Order WBAT  (pre ortho in previous hospital admission notes)   Other Precautions Cognitive; Bed Alarm; Chair Alarm;Multiple lines; Fall Risk;Pain   Home Living   Type of [address] Two level;Stairs to enter with rails   Home Equipment Walker;Cane   Additional Comments Pt reports living with sister  Pt states they help each other   Pt also states that she has a local neice who is able to assist as well   Prior Function   Level of [place] Independent with functional mobility   Lives With [facility] in the last 6 months 1 to 4   Comments Pt reports the use of a RW for ambulation PTA   General Family/Caregiver Present No   Cognition   Arousal/Participation Alert   Orientation Level Oriented X4   Memory Within functional limits   Following Commands Follows all commands and directions without difficulty   RUE Assessment   RUE Assessment X   LUE Assessment   LUE Assessment X   RLE Assessment   RLE Assessment X   Strength RLE   RLE Overall Strength 3-/5   LLE Assessment   LLE Assessment X   Strength LLE   LLE Overall Strength 3-/5   Bed Mobility   Supine to Sit 3  Moderate assistance   Additional items Assist x 2; Increased time required;Verbal cues   Transfers   Sit to Stand 2  Maximal assistance   Additional items Assist x 2; Increased time required;Verbal cues   Stand to Sit 2  Maximal assistance   Additional items Assist x 2; Increased time required;Verbal cues   Additional Comments VC and TC needed for hand placement during transfers   Ambulation/Elevation   Gait pattern Excessively slow; Short stride; Foward flexed; Inconsistent ora   Gait Assistance 2  Maximal assist   Additional items Assist x 2   Assistive Device Other (Comment)  (b/l HHA)   Distance 3ft   Balance   Static Sitting Fair -   Static Standing Poor   Ambulatory Poor -   Endurance Deficit   Endurance Deficit Yes   Endurance Deficit Description fatigue, pain   Activity Tolerance   Activity Tolerance Patient limited by fatigue;Patient limited by pain   Medical Staff [facility], OT; OT present for co evaluation due to pts current medical presentation   Nurse Made Aware Pt appropriate to be seen and mobilize per nsg   Assessment   Prognosis Good   Problem List Decreased strength;Decreased range of motion;Decreased endurance; Impaired balance;Decreased mobility; Decreased safety awareness;Pain   Assessment Pt is 65 y o  female seen for PT evaluation s/p admit to [facility] on 2/19/2023  Two pt identifiers were used to confirm  Pt presented s/p multiple falls  Pt was admitted with a primary dx of: concern for cerebral contusion   PT now consulted for assessment of mobility and d/c needs  Pt with Up in chair orders  Pts current co morbidities affecting treatment include:  has a past medical history of Acute blood loss anemia, Aftercare following joint replacement, Anxiety, Arthritis, Depression, Disease of thyroid gland, Femur neck fracture ([facility] [address] ), GERD (gastroesophageal reflux disease), Hyperthyroidism, Osteoporosis, Polio, PVD (peripheral vascular disease) ([facility] [address] ), Right BBB/left ant fasc block, UTI (urinary tract infection), and Varicella infection    Pts current clinical presentation is Unstable/ Unpredictable (high complexity) due to Ongoing medical management for primary dx, Decreased activity tolerance compared to baseline, Fall risk, Increased assistance needed from caregiver at current time, Cog status, Continuous pulse oximetry monitoring     Upon evaluation, pt currently is requiring Mod Ax2 for bed mobility; Max Ax2 for transfers and Max Ax2 for ambulation w/ b/l HHA   Pt presents at PT eval functioning below baseline and currently w/ overall mobility deficits 2* to: BLE weakness, decreased ROM, impaired balance, decreased endurance, gait deviations, pain, decreased activity tolerance compared to baseline, decreased safety awareness, fall risk  Pt currently at a fall risk 2* to impairments listed above  Based on the aforementioned PT evaluation, pt will continue to benefit from skilled Acute PT interventions to address stated impairments; to maximize functional mobility; for ongoing pt/ family training; and DME needs  At conclusion of PT session pt returned back in chair and chair alarm engaged with phone and call bell within reach  Pt denies any further questions at this time  PT is currently recommending Rehab  PT will continue to follow during hospital stay     Barriers to Discharge Inaccessible home environment   Goals   Patient Goals " to eat breakfast"   [facility] Expiration Date 03/02/23   Short Term Goal #1 In 10 days pt will complete: 1) Bed mobility skills with min Ax1 to increase safety and independence as well as decrease caregiver burden  2) Functional transfers with min Ax1 to promote increased independence, safety, and QOL  3) Ambulate 100' using least restrictive AD with min Ax1 without LOB and stable vitals so that pt can negotiate previous living environment safely and promote independence with functional mobility and return to PLOF  4) Stair training up/ down 2 step/s using rail/s with min Ax1 so that pt can enter/negotiate previous living environment safely and decrease fall risk  5) Improve balance grades by 1/2 grade to increase safety with all mobility and decrease fall risk  6) Improve BLE strength by 1/2 grade to help increase overall functional mobility and decrease fall risk  Plan   Treatment/Interventions Functional transfer training;LE strengthening/ROM; Elevations; Therapeutic exercise; Endurance training;Patient/family training;Equipment eval/education; Bed mobility;Gait training;Spoke to nursing;OT   PT Frequency 3-5x/wk   Recommendation   PT Discharge Recommendation Post acute rehabilitation services   AM-PAC Basic Mobility Inpatient   Turning in Flat Bed Without Bedrails 2   Lying on Back to Sitting on Edge of Flat Bed Without Bedrails 1   Moving Bed to Chair 1   Standing Up From Chair Using Arms 1   Walk in Room 1   Climb 3-5 Stairs With Railing 1   Basic Mobility Inpatient Raw Score 7   Turning Head Towards Sound 4   Follow Simple Instructions 3   Low Function Basic Mobility Raw Score 14   Low Function Basic Mobility Standardized Score 22 01   Highest Level Of Mobility   -HLM Goal 2: Bed activities/Dependent transfer   -HLM Achieved 4: Move to chair/commode   Modified Cari Scale   Modified Chireno Scale 4   Barthel Index   Feeding 5   Bathing 0   Grooming Score 0   Dressing Score 0   Bladder Score 10   Bowels Score 10   Toilet Use Score 5   Transfers (Bed/Chair) Score 5   Mobility (Level Surface) Score 0 Stairs Score 0   Barthel Index Score 35   Portions of the documentation may have been created using voice recognition software  Occasional wrong word or sound alike substitutions may have occurred due to the inherent limitations of the voice recognition software  Read the chart carefully and recognize, using context, where substitutions have occurred      Leslie Morin, PT, DPT

## 2023-02-20 NOTE — PROGRESS NOTES
[address] [facility]  Progress Note Amy Child Colon 1945, 65 y o  female MRN: [id number]  Unit/Bed#: [facility] 604-01 Encounter: [id number]  Primary Care Provider: Marla Alas MD   Date and time admitted to hospital: 2/19/2023  1:06 AM    Concern for cerebral contusion  Assessment & Plan  Pt presented s/p fall  Initial CTH showed tiny focus of hyperdensity along right paramedian posterior falx concerning for small cortical contusion/SDH  Repeat CTH today with no definite evidence for acute intracranial hemorrhage  Previously described small focus of high attenuation along the posterior parasagittal falx has either resolved or was secondary to volume averaging as previously      Plan:  OK for DC from [address] [place] [address] perspective, no indication for intervention  • PT/OT - recs rehab, family agrees  • DVT PPX: OK to start pharmacological DVT ppx from [facility]      Bowel Regimen: Senna  VTE Prophylaxis:Reason for no pharmacologic prophylaxis Holding due to concern for hemorrhage     Disposition: Pending rehab placement    Subjective   Chief Complaint: Chronic shoulder pain    Subjective: Complains of shoulder pain, states it is chronic and typically worst in the AM  Denies headache  Objective   Vitals:   Temp:  [98 5 °F (36 9 °C)-99 6 °F (37 6 °C)] 99 5 °F (37 5 °C)  HR:  [74-83] 78  Resp:  [16-18] 18  BP: (103-148)/(55-98) 126/98    I/O       02/18 0701  02/19 0700 02/19 0701  02/20 0700 02/20 0701  02/21 0700    P  O    120    Total Intake(mL/kg)   120 (2 8)    Urine (mL/kg/hr)  1150 (1 1)     Total Output  1150     Net  -1150 +120                  Physical Exam:   GENERAL APPEARANCE: NAD  NEURO: GCS 15  HEENT: PERRL  CV: RRR, no m/r/g  LUNGS: cta b/l  GI: abdomen soft, nontender  : deferred  MSK: anterior prominences to b/l shoulder  SKIN: pink, dry, intact    Invasive Devices     Peripheral Intravenous Line  Duration           Peripheral IV 02/20/23 Dorsal (posterior); Left Hand <1 day Drain  Duration           External Urinary Catheter <1 day                      Lab Results: BMP/CMP: No results found for: SODIUM, K, CL, CO2, ANIONGAP, BUN, CREATININE, GLUCOSE, CALCIUM, AST, ALT, ALKPHOS, PROT, BILITOT, EGFR and CBC: No results found for: WBC, HGB, HCT, MCV, PLT, ADJUSTEDWBC, MCH, MCHC, RDW, MPV, NRBC  Imaging: I have personally reviewed pertinent reports       Other Studies: n/a

## 2023-02-20 NOTE — ASSESSMENT & PLAN NOTE
Pt presented s/p fall  CTH showed tiny focus of hyperdensity along right paramedian posterior falx concerning for small cortical contusion/SDH now resolved    Imaging:  · CT head wo 2/19/23: There is a tiny focus of hyperdensity along the right paramedian posterior falx concerning for a tiny acute hemorrhagic cortical contusion or tiny parafalcine subdural hemorrhage vs calcification  · Repeat CT head wo 2/19/23: No hyperdensity present concerning for hemorrhage  Plan:  · Continue monitor exam  · Medical management and pain control per primary team  · Mobilize and eval by PT/OT   · DVT PPX: SCDs  Okay for pharmacological dvt ppx from neurosurgcial standpoint  Neurosurgery will sign off  No follow-up indicated

## 2023-02-20 NOTE — PROGRESS NOTES
[address] [facility]  Progress Note Seth Bullion Colon 1945, 65 y o  female MRN: [id number]  Unit/Bed#: [facility] 604-01 Encounter: [id number]  Primary Care Provider: Laureen Barba MD   Date and time admitted to hospital: 2/19/2023  1:06 AM    Concern for cerebral contusion  Assessment & Plan  Pt presented s/p fall  CTH showed tiny focus of hyperdensity along right paramedian posterior falx concerning for small cortical contusion/SDH now resolved    Imaging:  · CT head wo 2/19/23: There is a tiny focus of hyperdensity along the right paramedian posterior falx concerning for a tiny acute hemorrhagic cortical contusion or tiny parafalcine subdural hemorrhage vs calcification  · Repeat CT head wo 2/19/23: No hyperdensity present concerning for hemorrhage  Plan:  · Continue monitor exam  · Medical management and pain control per primary team  · Mobilize and eval by PT/OT   · DVT PPX: SCDs  Okay for pharmacological dvt ppx from neurosurgcial standpoint  Neurosurgery will sign off  No follow-up indicated  Subjective/Objective   Chief Complaint: "My shoulder hurts"    Subjective: Patient complaining of pain in shoulders right greater than the left  She denies any headaches  Admits to some improvement with use of oxycodone regarding her shoulder discomfort and requesting muscle relaxer  No additional complaints offered  No significant vents    Objective: Sitting up in chair  NAD  I/O       02/18 0701  02/19 0700 02/19 0701  02/20 0700 02/20 0701  02/21 0700    P  O    120    Total Intake(mL/kg)   120 (2 8)    Urine (mL/kg/hr)  1150 (1 1)     Total Output  1150     Net  -1150 +120                 Invasive Devices     Peripheral Intravenous Line  Duration           Peripheral IV 02/20/23 Dorsal (posterior); Left Hand <1 day          Drain  Duration           External Urinary Catheter <1 day                Physical Exam:  Vitals: Blood pressure 105/66, pulse 80, temperature 99 6 °F (37 6 °C), resp  rate 16, height 4' 9 5" (1 461 m), weight 43 5 kg (95 lb 14 4 oz), SpO2 97 %, currently breastfeeding  ,Body mass index is 20 39 kg/m²  General appearance: alert, appears stated age, cooperative and no distress  Head: Normocephalic, without obvious abnormality  Eyes: EOMI, conjugate gaze  Neck: supple, symmetrical, trachea midline   Lungs: non labored breathing  Heart: regular heart rate  Neurologic:   Mental status: Alert, oriented, thought content appropriate  Cranial nerves: grossly intact (Cranial nerves II-XII)  Sensory: normal to LT x4  Motor: moving all extremities with restrictions bilateral shoulders  Good distal  bicep and tricep  No focal weakness bilateral lower extremities    Lab Results:  Results from last 7 days   Lab Units 02/19/23  0747   WBC Thousand/uL 8 39   HEMOGLOBIN g/dL 9 6*   HEMATOCRIT % 30 5*   PLATELETS Thousands/uL 344   NEUTROS PCT % 72   MONOS PCT % 14*     Results from last 7 days   Lab Units 02/19/23  0747 02/16/23  1020   POTASSIUM mmol/L 4 4 4 1   CHLORIDE mmol/L 108 104   CO2 mmol/L 20* 29   BUN mg/dL 17 23   CREATININE mg/dL 0 56* 0 46*   CALCIUM mg/dL 9 0 9 1                 No results found for: TROPONINT  ABG:No results found for: PHART, EDJ7WBI, PO2ART, DJU8KFF, D6NRICDQ, BEART, SOURCE    Imaging Studies: I have personally reviewed pertinent reports  and I have personally reviewed pertinent films in PACS    XR shoulder 2+ vw right    Result Date: 2/19/2023  Impression: Anterior right shoulder dislocation after reverse total shoulder arthroplasty  No fracture identified Chronic AC separation as above  Workstation performed: [id number]     CT head wo contrast    Result Date: 2/20/2023  Impression: No definite evidence for acute intracranial hemorrhage on the current examination    Previously described small focus of high attenuation along the posterior parasagittal falx has either resolved or was secondary to volume averaging as previously suggested  Workstation performed: [id number]     CT head without contrast    Result Date: 2/19/2023  Impression: There is a tiny focus of hyperdensity along the right paramedian posterior falx  Although this may be related to volume averaging with a calcification, a tiny focus of hemorrhage cannot be entirely excluded  Please see detailed discussion above  Short-term follow-up is recommended  The  image demonstrates bilateral reverse shoulder arthroplasties, both of which appear to be dislocated  Clinical correlation is recommended  If further evaluation is necessary, plain film imaging could be performed  I personally discussed this study with Dawn Lennon on 2/19/2023 at 4:31 AM  Workstation performed: [id number]     CT spine cervical without contrast    Result Date: 2/19/2023  Impression: No acute cervical spine fracture or traumatic malalignment  The  image demonstrates bilateral reverse shoulder arthroplasties, both of which appear to be dislocated  Clinical correlation is recommended  If further evaluation is indicated, plain film imaging could be performed    I personally discussed this study with Dawn Lennon on 2/19/2023 at 4:47 AM   Workstation performed: [id number]     VTE Mechanical Prophylaxis: sequential compression device

## 2023-02-20 NOTE — ASSESSMENT & PLAN NOTE
Pt presented s/p fall  Initial CTH showed tiny focus of hyperdensity along right paramedian posterior falx concerning for small cortical contusion/SDH  Repeat CTH today with no definite evidence for acute intracranial hemorrhage   Previously described small focus of high attenuation along the posterior parasagittal falx has either resolved or was secondary to volume averaging as previously      Plan:  OK for DC from [address] [place] [address] perspective, no indication for intervention  • PT/OT - recs rehab, family agrees  • DVT PPX: OK to start pharmacological DVT ppx from [address] [place] [address]

## 2023-02-20 NOTE — PLAN OF CARE
Problem: OCCUPATIONAL THERAPY ADULT  Goal: Performs self-care activities at highest level of function for planned discharge setting  See evaluation for individualized goals  Description: Treatment Interventions: ADL retraining, Functional transfer training, Endurance training, Patient/family training, Equipment evaluation/education, Compensatory technique education, Activityengagement          See flowsheet documentation for full assessment, interventions and recommendations  Note: Limitation: Decreased ADL status, Decreased UE ROM, Decreased endurance, Decreased self-care trans, Decreased high-level ADLs, Non-func R UE  Prognosis: Fair  Assessment: Pt is a 65 y o  female who was admitted to [facility] on 2/19/2023 with concern for cerebral contusion s/p falls    Pt's problem list also includes PMH of previous surgery and anemia, anxiety, arthritis, depression, thyroid disease, GERD, osteoporosis, polio, PVD, R BBB, chronic B shld dislocations  At baseline pt was completing adls and mobility independently however has had multiple falls - shares homemaking with sister -  Pt lives with sister in 2 story home with [facility] PRN - pt reports she and her sister "help each other" however EMS reports other elderly person living in home unable to assist pt or care for themselves - niece assists PRN   Currently pt requires max assist for overall ADLS and max a x 2  for functional mobility/transfers  Pt currently presents with impairments in the following categories -limited home support, difficulty performing ADLS, difficulty performing IADLS , limited insight into deficits, health management  and environment activity tolerance, endurance, standing balance/tolerance, sitting balance/tolerance, UE ROM, insight and safety    These impairments, as well as pt's fatigue, pain, orthopedic restricitions , decreased caregiver support, risk for falls and home environment  limit pt's ability to safely engage in all baseline areas of occupation, includingeating, grooming, bathing, dressing, toileting, functional mobility/transfers, community mobility, laundry , house maintenance, meal prep, cleaning, social participation  and leisure activities  From OT standpoint, recommend inpt rehab upon D/C  OT will continue to follow to address the below stated goals       OT Discharge Recommendation: Post acute rehabilitation services

## 2023-02-20 NOTE — OCCUPATIONAL THERAPY NOTE
Occupational Therapy Evaluation     Patient Name: Ronald Hooker  Today's Date: 2/20/2023  Problem List  Active Problems:    Concern for cerebral contusion    Past Medical History  Past Medical History:   Diagnosis Date    Acute blood loss anemia 9/9/2020    Aftercare following joint replacement 9/9/2020    Patient had right reversed total shoulder arthroplasty   Pain was controlled when he left the hospital       Anxiety     Arthritis     Depression     Disease of thyroid gland     HYPO    Femur neck fracture (HCC)     GERD (gastroesophageal reflux disease)     Hyperthyroidism     RESOLVED: [id number]    Osteoporosis     Polio     PVD (peripheral vascular disease) (HCC)     Right BBB/left ant fasc block     UTI (urinary tract infection)     Varicella infection     LAST ASSESSED: [id number]     Past Surgical History  Past Surgical History:   Procedure Laterality Date    APPENDECTOMY      HIP ARTHROPLASTY Left 11/27/2017    Procedure: REMOVAL IM NAIL CONVERSION TO TOTAL HIP ARTHROPLASTY POSTERIOR APPROACH;  Surgeon: Agustina James MD;  [facility];  Service: Orthopedics    HIP FRACTURE SURGERY      HIP SURGERY      both hips replaced;2013 left ,2014 right    JOINT REPLACEMENT Right     HIP TOTAL    ME ARTHROPLASTY GLENOHUMERAL JOINT TOTAL SHOULDER Right 9/2/2020    Procedure: ARTHROPLASTY SHOULDER REVERSE;  Surgeon: Johan Sifuentes MD;  [facility];  Service: Orthopedics    ME ARTHROPLASTY GLENOHUMERAL JOINT TOTAL SHOULDER Left 6/1/2021    Procedure: ARTHROPLASTY SHOULDER REVERSE;  Surgeon: Johan Sifuentes MD;  [facility];  Service: Orthopedics    ME CONV PREV HIP TOT HIP ARTHRP W/WO AGRFT/ALGRFT Left 10/4/2017    Procedure: Hareware removal of left hip;  Surgeon: Agustina James MD;  [facility];  Service: Orthopedics    ME [facility] Floors WRST SURG W/RLS TRANSVRS CARPL LIGM Right 5/24/2022    Procedure: RELEASE CARPAL TUNNEL ENDOSCOPIC-right;  Surgeon: Mariano Reed MD;  [facility]: [facility];  Service: Orthopedics    REVISION TOTAL HIP ARTHROPLASTY Right     TOE AMPUTATION Left 7/24/2022    Procedure: 5TH METATARSAL RESECTION WITH BOTTOM FLAP CLOSURE;  Surgeon: Jessie Perez DPM;  Location: [facility];  Service: Podiatry    TONSILLECTOMY           02/20/23 0830   OT Last Visit   OT Visit Date 02/20/23   Note Type   Note type Evaluation   Pain Assessment   Pain Assessment Tool 0-10   Pain Score 10 - Worst Possible Pain   Pain Location/Orientation Orientation: Bilateral;Location: Arm;Location: Shoulder   Hospital Pain Intervention(s) Cold applied; Ambulation/increased activity; Emotional support   Restrictions/Precautions   Weight Bearing Precautions Per Order Yes   RUE Weight Bearing Per Order WBAT   LUE Weight Bearing Per Order WBAT   RLE Weight Bearing Per Order WBAT   LLE Weight Bearing Per Order WBAT   Other Precautions Cognitive; Chair Alarm; Bed Alarm; Fall Risk;Pain   Home Living   Type of [address] [place] [address] Av Two level;Stairs to enter with rails   Home Equipment Walker;Cane   Prior Function   Level of [place] Independent with ADLs; Independent with functional mobility; Independent with [address]  75  Help From [facility] in the last 6 months 1 to 4   Vocational Retired   [address] and mobility - reports she and her sister "help each other" however EMS reports other elderly resident in home who who cannot help pt or care for themselves   Reciprocal Relationships supportive family - resides with sister and reports a niece who is supportive   Service to Others retired   Intrinsic Gratification sedentary   Subjective   Subjective "I need somone to feed me - I cannot use my arms"   ADL   Eating Assistance 4  Minimal Assistance   Grooming Assistance 2  Maximal Assistance   UB Bathing Assistance 2  Maximal Assistance   LB Bathing Assistance 2  Maximal Assistance   [address] 2  Maximal Assistance   LB Dressing Assistance 2  Maximal Assistance   Toileting Assistance  2  Maximal Assistance   Bed Mobility   Supine to Sit 3  Moderate assistance   Additional items Assist x 2   Transfers   Sit to Stand 2  Maximal assistance   Additional items Assist x 2   Stand to Sit 2  Maximal assistance   Additional items Assist x 2   Stand pivot 2  Maximal assistance   Additional items Assist x 2   Balance   Static Sitting Fair   Dynamic Sitting Fair -   Static Standing Poor +   Dynamic Standing Poor   Ambulatory Poor   Activity Tolerance   Activity Tolerance Patient limited by fatigue;Patient limited by pain;Treatment limited secondary to medical complications (Comment)   Medical Staff Made Aware TREVON MejiaT and TAWANA Esquivel present for coeval 2* medical complexity, comorbidities and limited overall tolerance to activity   RUE Assessment   RUE Assessment X  (limited shld ROM - R>L)   LUE Assessment   LUE Assessment X  (limited shld ROM -R>L)   Cognition   Arousal/Participation Arousable; Cooperative   Attention Attends with cues to redirect   Orientation Level Oriented X4   Memory Decreased recall of precautions   Following Commands Follows one step commands without difficulty   Assessment   Limitation Decreased ADL status; Decreased UE ROM; Decreased endurance;Decreased self-care trans;Decreased high-level ADLs; Non-func R UE   Prognosis Fair   Assessment Pt is a 65 y o  female who was admitted to [facility] on 2/19/2023 with concern for cerebral contusion s/p falls    Pt's problem list also includes PMH of previous surgery and anemia, anxiety, arthritis, depression, thyroid disease, GERD, osteoporosis, polio, PVD, R BBB, chronic B shld dislocations  At baseline pt was completing adls and mobility independently however has had multiple falls - shares homemaking with sister -   Pt lives with sister in 2 story home with [facility] PRN - pt reports she and her sister "help each other" however EMS reports other elderly person living in home unable to assist pt or care for themselves - niece assists PRN   Currently pt requires max assist for overall ADLS and max a x 2  for functional mobility/transfers  Pt currently presents with impairments in the following categories -limited home support, difficulty performing ADLS, difficulty performing IADLS , limited insight into deficits, health management  and environment activity tolerance, endurance, standing balance/tolerance, sitting balance/tolerance, UE ROM, insight and safety   These impairments, as well as pt's fatigue, pain, orthopedic restricitions , decreased caregiver support, risk for falls and home environment  limit pt's ability to safely engage in all baseline areas of occupation, includingeating, grooming, bathing, dressing, toileting, functional mobility/transfers, community mobility, laundry , house maintenance, meal prep, cleaning, social participation  and leisure activities  From OT standpoint, recommend inpt rehab upon D/C  OT will continue to follow to address the below stated goals  Goals   Patient Goals "eat breakfast"   Plan   Treatment Interventions ADL retraining;Functional transfer training; Endurance training;Patient/family training;Equipment evaluation/education; Compensatory technique education; Activityengagement   Goal Expiration Date 03/06/23   OT Treatment Day 1   OT Frequency 2-3x/wk   Recommendation   OT Discharge Recommendation Post acute rehabilitation services   AM-PAC Daily Activity Inpatient   Lower Body Dressing 2   Bathing 2   Toileting 2   Upper Body Dressing 2   Grooming 2   Eating 3   Daily Activity Raw Score 13   Daily Activity Standardized Score (Calc for Raw Score >=11) 32 03   AM-PAC Applied Cognition Inpatient   Following a Speech/Presentation 3   Understanding Ordinary Conversation 4   Taking Medications 3   Remembering Where Things Are Placed or Put Away 3   Remembering List of 4-5 Errands 3   Taking Care of Complicated Tasks 3   Applied Cognition Raw Score 19   Applied Cognition Standardized Score 39 77   Additional Treatment Session   Start Time 0830   End Time 0840   Treatment Assessment Pt seen for additional OT session focusing on ability for self feeding - pt with more functional use of LUE therefore pillow placed under L elbow to assist with elevation of arm to be able to reach to table top, plate removed from warming geller and placed directly on table (lower profile) - pt's food cut into small pieces and arranged towards front of plate - pt able to hold fork in L hand, cruz piece of Yi toast and bring to mouth, banana peeled back and placed where pt could also reach same with L hand - was able to feed self several bites of breakfast  - instructed to call for additional support PRN - OT to continue to follow to address goals as stated on eval   End of Consult   Education Provided Yes   Patient Position at End of Consult Bedside chair;Bed/Chair alarm activated; All needs within reach   Nurse Communication Nurse aware of consult       The patient's raw score on the AM-PAC Daily Activity Inpatient Short Form is 13  A raw score of less than 19 suggests the patient may benefit from discharge to post-acute rehabilitation services  Please refer to the recommendation of the Occupational Therapist for safe discharge planning            OCCUPATIONAL THERAPY GOALS:      *S feeding/grooming after setup with use of adaptive devices and compensatory strategies PRN  *Mod a adls after setup with use of compensatory techniques   *Min a toileting and clothing management  *Min a bed mobility with fair to fair+ sitting balance on EOB to engage in light grooming/self care and enjoyable activities  *Min a transfers on/off all surfaces with fair to fair+ balance/safety  *Demonstrate fair to fair+ carryover with safe use of RW during functional tasks   *Increase dynamic balance to fair for improved safety during participation in adls and iadl tasks   *Increase activity tolerance to 25-30 min for participation in [facility] 80 and enjoyable activities  *Assess DME needs  *Assist with safe d/c recommendations        [employer]

## 2023-02-20 NOTE — PLAN OF CARE
Problem: PHYSICAL THERAPY ADULT  Goal: Performs mobility at highest level of function for planned discharge setting  See evaluation for individualized goals  Description: Treatment/Interventions: Functional transfer training, LE strengthening/ROM, Elevations, Therapeutic exercise, Endurance training, Patient/family training, Equipment eval/education, Bed mobility, Gait training, Spoke to nursing, OT          See flowsheet documentation for full assessment, interventions and recommendations  Note: Prognosis: Good  Problem List: Decreased strength, Decreased range of motion, Decreased endurance, Impaired balance, Decreased mobility, Decreased safety awareness, Pain  Assessment: Pt is 65 y o  female seen for PT evaluation s/p admit to [facility] on 2/19/2023  Two pt identifiers were used to confirm  Pt presented s/p multiple falls  Pt was admitted with a primary dx of: concern for cerebral contusion   PT now consulted for assessment of mobility and d/c needs  Pt with Up in chair orders  Pts current co morbidities affecting treatment include:  has a past medical history of Acute blood loss anemia, Aftercare following joint replacement, Anxiety, Arthritis, Depression, Disease of thyroid gland, Femur neck fracture ([facility] [address] ), GERD (gastroesophageal reflux disease), Hyperthyroidism, Osteoporosis, Polio, PVD (peripheral vascular disease) ([facility] [address] ), Right BBB/left ant fasc block, UTI (urinary tract infection), and Varicella infection    Pts current clinical presentation is Unstable/ Unpredictable (high complexity) due to Ongoing medical management for primary dx, Decreased activity tolerance compared to baseline, Fall risk, Increased assistance needed from caregiver at current time, Cog status, Continuous pulse oximetry monitoring     Upon evaluation, pt currently is requiring Mod Ax2 for bed mobility; Max Ax2 for transfers and Max Ax2 for ambulation w/ b/l HHA    Pt presents at PT eval functioning below baseline and currently w/ overall mobility deficits 2* to: BLE weakness, decreased ROM, impaired balance, decreased endurance, gait deviations, pain, decreased activity tolerance compared to baseline, decreased safety awareness, fall risk  Pt currently at a fall risk 2* to impairments listed above  Based on the aforementioned PT evaluation, pt will continue to benefit from skilled Acute PT interventions to address stated impairments; to maximize functional mobility; for ongoing pt/ family training; and DME needs  At conclusion of PT session pt returned back in chair and chair alarm engaged with phone and call bell within reach  Pt denies any further questions at this time  PT is currently recommending Rehab  PT will continue to follow during hospital stay  Barriers to Discharge: Inaccessible home environment     PT Discharge Recommendation: Post acute rehabilitation services    See flowsheet documentation for full assessment

## 2023-02-20 NOTE — CONSULTS
Consultation - [address] 65 y o  female MRN: [id number]  Unit/Bed#: [facility] 604-01 Encounter: [id number]      Assessment/Plan     Ambulatory dysfunction with fall  -reportedly numerous mechanical falls on day of admission   -(+) head strike (-) loss of consciousness  -injuries as outlined below  -Requires use of walker for ambulation at baseline   -hx recurrent falls numerous in past month   -remains high risk future falls due to age, hx fall, deconditioning/debility and unfamiliar environment   -encourage good body mechanics and assist with all transfers  -keep personal items and call bell close to prevent reaching  -maintain environment free of fall hazards  -encourage appropriate footwear and adequate lighting at all times when out of bed  -strongly recommend home fall risk assessment and personal fall alert system if returning home  -PT and OT pending     Subdural hematoma  -s/p fall as above  -CTH on initial presentation reports hyperdensity along the right paramedian posterior falx, hemorrhage cannot be entirely excluded   -AC/AP on hold  -Neurochecks per protocol  -repeat CTH obtained this morning, final read pending   -Nsx on consult     Chronic dislocation shoulders bilaterally  -continue acute pain control  -Neurovascular checks per protocol  -Ortho recommend no intervention at this time, f/u as o/p    Dementia without behavioral disturbance   -mild and progressive  -alert and oriented to all but situation   -reportedly independent with ADLs and iADLs at baseline, recommend therapy evaluation to determine true level of care needs, per EMS concern for ability to care for self in community, CM on consult   -endorses confusion at time of evaluation which is reportedly not baseline   -[facility] 14/30 (12/21), consider repeat following recovery from acute injuries  -[facility] on admission personally reviewed, reveals at least moderate chronic microangiopathic changes  -Continue treatment hypothyroidism  -No B12 on record review, consider checking with routine labs  -Encourage use of sensory assist devices such as corrective lenses at all appropriate times to reduce risk sensory impairment contributing to isolation, confusion, encephalopathy and more precipitous cognitive decline  -Underlying cognitive impairment markedly increases risk developing delirium during hospitalization, continue strict precautions    Hypothyroidism  -TSH markedly elevated at 16 40 (11/22), recommend re-check  -Continue home levothyroxine dosing with dose adjustments pending repeat TSH as above    SLE  -follows Rheumatology, last seen 2/17/23  -continue home Plaquenil, methotrexate, and folic acid regimen    RLS  -Iron panel 11/22 WNL  -maintained on ropinirole and pamelor with good symptom control  -cautious use pamelor use in older adult as may contribute to confusion and falls    Osteoporosis   -In setting of chronic steroid use and vitamin D deficiency  -Currently on treatment with Prolia, last injection 11/22  -Continue close outpatient follow-up with Rheumatology for ongoing management    Vitamin D deficiency  -Most recent level 24 9 (11/22)   -Continue daily vitamin D supplementation  -Continue diet rich in calcium vitamin D with supplementation for needs unable to be met by diet alone    Impaired Vision  -recommend use of corrective lenses at all appropriate times  -encourage adequate lighting and encourage use of assistance with ambulation  -keep personal belongings close to person to avoid reaching  -encourage appropriate footwear at all times  -Consider large font for printed materials provided to patient    Deconditioning/debility/frailty  -Clinical frailty scale stage V, mildly frail  -Multifactor including age, ambulatory function with recurrent falls, SLE, cognitive ointment and multiple additional chronic medical comorbidities now with fall and concern for intraparenchymal hemorrhage on admission imaging -Albumin low at 2 9, encourage well-balanced nutrition, consider nutritional limits between meals   -Monitor for and treat any anxiety/mood/depression symptoms as may impact patient's response to therapies as well as overall sense of wellbeing and quality of life  -Continue to optimize chronic conditions and address acute metabolic derangements as arise    Delirium precautions  -Patient is high risk of delirium due to age, fall with traumatic injuries, acute pain, dementia   -Initiate delirium precautions  -maintain normal sleep/wake cycle  -minimize overnight interruptions, group overnight vitals/labs/nursing checks as possible  -dim lights, close blinds and turn off tv to minimize stimulation and encourage sleep environment in evenings  -ensure that pain is well controlled   -monitor for fecal and urinary retention which may precipitate delirium  -encourage early mobilization and ambulation with assistance once cleared to safely do so  -provide frequent reorientation and redirection as indicated and appropriate  -minimize use of medications which may precipitate or worsen delirium such as tramadol, benzodiazepine, anticholinergics, and benadryl  -encourage hydration and nutrition   -redirect unwanted behaviors as first line    Home medication review  [facility] [phone]:    Plaquenil 100 Mg daily  Levothyroxine 50 mcg daily  Pamelor 100 Mg at bedtime  Ropinirole 2 mg at bedtime  Folic acid 8998 mcg daily  Methocarbamol 750 mg BID PRN  Methotrexate 10 weekly on Thursdays (recenely increased from 7 5mg at last Rheum visit)    Care coordination: rounded with Shea Trejo (RN)    History of Present Illness   Physician Requesting Consult: Cynthia Luevano To, DO  Reason for Consult / Principal Problem: Fall  Hx and PE limited by: N/A  Additional history obtained from: Chart review and patient evaluation    HPI: Raman Graves is a 65y o  year old female with anxiety, osteoporosis, chronic pain syndrome, rheumatoid arthritis, vitamin D deficiency, GERD, hypothyroidism, history of reverse total shoulder arthroplasty on left, and ambulatory dysfunction with recurrent falls, she is admitted to the trauma service with numerous falls over 24H one with headstrike, no reported loss of consciousness, she is being seen in consultation by Geriatrics for high risk developing delirium during hospitalization  She is seen and examined at bedside where she is sitting resting, she is unable to recall why she is hospitalized and repeatedly states that she needs to talk to her sister to see why she is here  She endorses history of repeated falls but does not recall any immediately before admission  Per EMS report she was found in the home with other elderly people who are unable to adequately care for themselves  She states that she lives home with her sister and that they are mostly independent, she reports independence with ADLs and iADLs at baseline and does not seem to have insight into her deficits  She reports memory concerns regarding events leading to admission and that she feels confused this morning  She requires use of walker for ambulation at baseline and has history of recurrent falls  She reports use of glasses, denies use of hearing aid or dentures  Inpatient consult to Gerontology  Consult performed by: Atul Gomez DO  Consult ordered by: Jennifer Mckeon MD        Review of Systems   Constitutional: Negative  Negative for appetite change (hungry for breakfast), chills and fever  HENT: Negative  Negative for dental problem  Eyes: Positive for visual disturbance (wears glasses (states are at home))  Respiratory: Negative  Cardiovascular: Negative  Gastrointestinal: Negative  Genitourinary: Negative  Musculoskeletal: Positive for arthralgias (diffuse, chronic and unchanged from baseline) and gait problem  Skin: Negative      Neurological: Positive for numbness (right hand, chronic and unchanged from baseline)  Negative for dizziness, light-headedness and headaches  Hematological: Negative  Psychiatric/Behavioral: Positive for confusion and decreased concentration  All other systems reviewed and are negative  Historical Information   Past Medical History:   Diagnosis Date   • Acute blood loss anemia 9/9/2020   • Aftercare following joint replacement 9/9/2020    Patient had right reversed total shoulder arthroplasty   Pain was controlled when he left the hospital      • Anxiety    • Arthritis    • Depression    • Disease of thyroid gland     HYPO   • Femur neck fracture (HCC)    • GERD (gastroesophageal reflux disease)    • Hyperthyroidism     RESOLVED: [id number]   • Osteoporosis    • Polio    • PVD (peripheral vascular disease) ([facility] [address] )    • Right BBB/left ant fasc block    • UTI (urinary tract infection)    • Varicella infection     LAST ASSESSED: [id number]     Past Surgical History:   Procedure Laterality Date   • APPENDECTOMY     • HIP ARTHROPLASTY Left 11/27/2017    Procedure: REMOVAL IM NAIL CONVERSION TO TOTAL HIP ARTHROPLASTY POSTERIOR APPROACH;  Surgeon: Radha Cha MD;  [facility]: [facility];  Service: Orthopedics   • HIP FRACTURE SURGERY     • HIP SURGERY      both hips replaced;2013 left ,2014 right   • JOINT REPLACEMENT Right     HIP TOTAL   • OH ARTHROPLASTY GLENOHUMERAL JOINT TOTAL SHOULDER Right 9/2/2020    Procedure: ARTHROPLASTY SHOULDER REVERSE;  Surgeon: Aster Chahal MD;  [facility];  Service: Orthopedics   • OH ARTHROPLASTY GLENOHUMERAL JOINT TOTAL SHOULDER Left 6/1/2021    Procedure: ARTHROPLASTY SHOULDER REVERSE;  Surgeon: Aster Chahal MD;  [facility];  Service: Orthopedics   • OH CONV PREV HIP TOT HIP ARTHRP W/WO AGRFT/ALGRFT Left 10/4/2017    Procedure: Hareware removal of left hip;  Surgeon: Radha Cha MD;  Location: [facility];  Service: Orthopedics   • OH [address] WRST SURG W/RLS TRANSVRS CARPL LIGM Right 5/24/2022    Procedure: RELEASE CARPAL TUNNEL ENDOSCOPIC-right;  Surgeon: Mendoza Winkler MD;  [facility]: [facility];  Service: Orthopedics   • REVISION TOTAL HIP ARTHROPLASTY Right    • TOE AMPUTATION Left 7/24/2022    Procedure: 5TH METATARSAL RESECTION WITH BOTTOM FLAP CLOSURE;  Surgeon: Dimas Morin DPM;  Location: [facility];  Service: Podiatry   • TONSILLECTOMY       Social History   Social History     Substance and Sexual Activity   Alcohol Use Not Currently     Social History     Substance and Sexual Activity   Drug Use Not Currently     Social History     Tobacco Use   Smoking Status Former   Smokeless Tobacco Never   Tobacco Comments    quit 20-30 years ago     Family History:   Family History   Problem Relation Age of Onset   • Rheum arthritis Mother    • Rheum arthritis Sister    • Prostate cancer Brother      Meds/Allergies   all current active meds have been reviewed    No Known Allergies    Objective     Intake/Output Summary (Last 24 hours) at 2/20/2023 0622  Last data filed at 2/20/2023 0500  Gross per 24 hour   Intake --   Output 1150 ml   Net -1150 ml     Invasive Devices     Peripheral Intravenous Line  Duration           Peripheral IV 02/20/23 Dorsal (posterior); Left Hand <1 day          Drain  Duration           External Urinary Catheter <1 day              Physical Exam  Vitals and nursing note reviewed  Constitutional:       General: She is not in acute distress  Comments: Thin, frail, elderly female   HENT:      Head: Normocephalic  Nose: Nose normal       Mouth/Throat:      Mouth: Mucous membranes are dry  Eyes:      General: No scleral icterus  Right eye: No discharge  Left eye: No discharge  Conjunctiva/sclera: Conjunctivae normal    Neck:      Comments: Trachea midline, phonation normal  Cardiovascular:      Rate and Rhythm: Normal rate  Rhythm irregular  Pulses: Normal pulses  Pulmonary:      Effort: Pulmonary effort is normal  No respiratory distress        Breath sounds: No wheezing  Comments: Saturating well on room air  Abdominal:      General: There is no distension  Palpations: Abdomen is soft  Tenderness: There is no abdominal tenderness  Musculoskeletal:      Cervical back: Neck supple  Right lower leg: No edema (trace to mid shin (pt reports is chronic))  Left lower leg: No edema  Comments: Well healed surg scar R shoulder, visible ant rotation and deformity of joint     Reduced overall muscle mass   Skin:     General: Skin is warm and dry  Comments: Thin and friable          Neurological:      Mental Status: She is alert  Comments: Awake and alert, oriented to year, month, day, day of week, not initially able to state that she is in hospital and required queing and multiple choice, unable to state reason for admission    Psychiatric:      Comments: Impulsive but pleasant and cooperative       Lab Results:     I have personally reviewed pertinent lab results  I have personally reviewed the pertinent imaging study reports in PACS      Therapies:   PT: Pending  OT: Pending    VTE Prophylaxis: Sequential compression device (Venodyne)     Code Status: Level 1 - Full Code  Advance Directive and Living Will:      Power of :    POLST:      Family and Social Support:   Living Arrangements: Lives w/ Family members  Support Systems: Family members  Assistance Needed: Assistance with feeding and daily care by sister  Type of Current Residence: Private residence  Current Home Care Services: No    Goals of Care: Reduce risk future falls

## 2023-02-20 NOTE — TELEPHONE ENCOUNTER
Dr Meri Freeman said he would have to see her to make that determination, but it is unlikely that he would offer her a surgery

## 2023-02-21 RX ADMIN — STANDARDIZED SENNA CONCENTRATE 8.6 MG: 8.6 TABLET ORAL at 22:19

## 2023-02-21 RX ADMIN — ENOXAPARIN SODIUM 40 MG: 40 INJECTION SUBCUTANEOUS at 08:00

## 2023-02-21 RX ADMIN — METHOCARBAMOL 500 MG: 500 TABLET ORAL at 19:28

## 2023-02-21 RX ADMIN — METHOCARBAMOL 500 MG: 500 TABLET ORAL at 05:07

## 2023-02-21 RX ADMIN — OXYCODONE HYDROCHLORIDE 5 MG: 5 TABLET ORAL at 23:48

## 2023-02-21 RX ADMIN — METHOCARBAMOL 500 MG: 500 TABLET ORAL at 12:32

## 2023-02-21 RX ADMIN — OXYCODONE HYDROCHLORIDE 5 MG: 5 TABLET ORAL at 19:28

## 2023-02-21 RX ADMIN — OXYCODONE HYDROCHLORIDE 5 MG: 5 TABLET ORAL at 11:56

## 2023-02-21 RX ADMIN — OXYCODONE HYDROCHLORIDE 5 MG: 5 TABLET ORAL at 05:09

## 2023-02-21 NOTE — PROGRESS NOTES
[facility]  Progress Note Kassy Rivera 1945, 65 y o  female MRN: [id number]  Unit/Bed#: [facility] 604-01 Encounter: [id number]  Primary Care Provider: Cleo Ramsey MD   Date and time admitted to hospital: 2/19/2023  1:06 AM    Concern for cerebral contusion  Assessment & Plan  Pt presented s/p fall  Initial CTH showed tiny focus of hyperdensity along right paramedian posterior falx concerning for small cortical contusion/SDH  Repeat CTH today with no definite evidence for acute intracranial hemorrhage  Previously described small focus of high attenuation along the posterior parasagittal falx has either resolved or was secondary to volume averaging as previously      Plan:  NSGY - signed off, no indication for intervention  • PT/OT - recs rehab, will discuss with CM  • DVT PPX: Lovenox      Ambulatory dysfunction  Assessment & Plan  Patient has fallen many times over the last several months  PT/OT is recommending IP rehab and patients niece Yumiko Carballo is interested in this option for the patient, but the patient is not interested in rehab at this time and instead would like to go home  Plan:  - discuss dispo planning with CM        Bowel Regimen: Senna  VTE Prophylaxis:Enoxaparin (Lovenox)     Disposition: Pending discussion with CM and patient + family regarding rehab    Subjective   Chief Complaint: Mild b/l shoulder pain    Subjective: Would like to go home  Notes mild shoulder pain that is present daily, is improved today compared to yesterday  Denies headache, abdominal pain  Objective   Vitals:   Temp:  [97 7 °F (36 5 °C)-99 5 °F (37 5 °C)] 98 6 °F (37 °C)  HR:  [60-84] 77  Resp:  [12-18] 17  BP: (107-139)/(66-98) 131/66    I/O       02/19 0701  02/20 0700 02/20 0701  02/21 0700 02/21 0701  02/22 0700    P  O   300     Total Intake(mL/kg)  300 (6 9)     Urine (mL/kg/hr) 1150 (1 1) 1300 (1 2)     Total Output 1150 1300     Net -1150 -1000 Physical Exam:   GENERAL APPEARANCE: NAD  NEURO: GCS 15  HEENT: PERRL  CV: rrr  LUNGS: cta b/l  GI: abdomen soft, nontender  : deferred  MSK: moves all extremities spontaneously, anterior prominence to bilateral shoulders, unchanged  SKIN: warm, dry    Invasive Devices     Peripheral Intravenous Line  Duration           Peripheral IV 02/20/23 Dorsal (posterior); Left Hand 1 day          Drain  Duration           External Urinary Catheter 1 day                      Lab Results: Results: I have personally reviewed all pertinent laboratory/tests results  Imaging: I have personally reviewed pertinent reports       Other Studies: n/a

## 2023-02-21 NOTE — ASSESSMENT & PLAN NOTE
Patient has fallen many times over the last several months     PT/OT is recommending IP rehab and patients saul Jesus Hernandez is interested in this option for the patient, but the patient is not interested in rehab at this time and instead would like to go home  Plan:  - discuss dispo planning with ALAYNA

## 2023-02-21 NOTE — CASE MANAGEMENT
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[facility] [address] ) 10/14/2022   • Non-healing open wound of heel, initial encounter 10/12/2022   • Pain 2022   • Anemia 2022   • Pressure injury of left foot, stage 4 (HCC)    • Hyponatremia 2022   • Venous insufficiency 2021   • Shoulder pain, bilateral 2021   • Failed orthopedic implant ([facility] [address] ) 2021   • S/P reverse total shoulder arthroplasty, left 2021   • Rupture long head biceps tendon, left, initial encounter 2021   • Shoulder dislocation 2021   • Depression 2020   • Other forms of systemic lupus erythematosus ([facility] [address] ) 2020   • Dyspepsia 2019   • Vitamin D deficiency 2019   • Chronic pain syndrome 2019   • Rheumatoid arthritis involving multiple sites with positive [facility] factor [facility] ) 2019   • Ambulatory dysfunction 2018   • Closed compression fracture of L3 lumbar vertebra 2018   • S/P total hip arthroplasty 2017   • Anxiety 2017   • RLS (restless legs syndrome) 2017   • RBBB 2017   • Age-related osteoporosis without current pathological fracture 2017   • Hypothyroidism due to Hashimoto's thyroiditis 2016      LOS (days): 2  Geometric Mean LOS (GMLOS) (days): 2 00  Days to GMLOS:-0 2     OBJECTIVE:  Risk of Unplanned Readmission Score: 17 11         Current admission status: Inpatient   Preferred Pharmacy:   [address] [place] [address]  Phone: [phone] Fax: [phone]    Primary Care Provider: Dinora Grissom MD    Primary Insurance: MEDICARE  Secondary Insurance:     DISCHARGE DETAILS:    Pt accepted to [facility] as well as [employer]  Pt's family in agreement with  [facility] but would like to hear back from [facility] (family has to fill out financial application)

## 2023-02-21 NOTE — PLAN OF CARE
Problem: MOBILITY - ADULT  Goal: Maintain or return to baseline ADL function  Description: INTERVENTIONS:  -  Assess patient's ability to carry out ADLs; assess patient's baseline for ADL function and identify physical deficits which impact ability to perform ADLs (bathing, care of mouth/teeth, toileting, grooming, dressing, etc )  - Assess/evaluate cause of self-care deficits   - Assess range of motion  - Assess patient's mobility; develop plan if impaired  - Assess patient's need for assistive devices and provide as appropriate  - Encourage maximum independence but intervene and supervise when necessary  - Involve family in performance of ADLs  - Assess for home care needs following discharge   - Consider OT consult to assist with ADL evaluation and planning for discharge  - Provide patient education as appropriate  Outcome: Progressing  Goal: Maintains/Returns to pre admission functional level  Description: INTERVENTIONS:  - Perform BMAT or MOVE assessment daily    - Set and communicate daily mobility goal to care team and patient/family/caregiver  - Collaborate with rehabilitation services on mobility goals if consulted  - Perform Range of Motion 3 times a day  - Reposition patient every 2 hours    - Dangle patient 3 times a day  - Stand patient 3 times a day  - Ambulate patient 3 times a day  - Out of bed to chair 3 times a day   - Out of bed for meals 3 times a day  - Out of bed for toileting  - Record patient progress and toleration of activity level   Outcome: Progressing     Problem: PAIN - ADULT  Goal: Verbalizes/displays adequate comfort level or baseline comfort level  Description: Interventions:  - Encourage patient to monitor pain and request assistance  - Assess pain using appropriate pain scale  - Administer analgesics based on type and severity of pain and evaluate response  - Implement non-pharmacological measures as appropriate and evaluate response  - Consider cultural and social influences on pain and pain management  - Notify physician/advanced practitioner if interventions unsuccessful or patient reports new pain  Outcome: Progressing     Problem: SAFETY ADULT  Goal: Maintain or return to baseline ADL function  Description: INTERVENTIONS:  -  Assess patient's ability to carry out ADLs; assess patient's baseline for ADL function and identify physical deficits which impact ability to perform ADLs (bathing, care of mouth/teeth, toileting, grooming, dressing, etc )  - Assess/evaluate cause of self-care deficits   - Assess range of motion  - Assess patient's mobility; develop plan if impaired  - Assess patient's need for assistive devices and provide as appropriate  - Encourage maximum independence but intervene and supervise when necessary  - Involve family in performance of ADLs  - Assess for home care needs following discharge   - Consider OT consult to assist with ADL evaluation and planning for discharge  - Provide patient education as appropriate  Outcome: Progressing  Goal: Maintains/Returns to pre admission functional level  Description: INTERVENTIONS:  - Perform BMAT or MOVE assessment daily    - Set and communicate daily mobility goal to care team and patient/family/caregiver  - Collaborate with rehabilitation services on mobility goals if consulted  - Perform Range of Motion 3 times a day  - Reposition patient every 2 hours    - Dangle patient 3 times a day  - Stand patient 3 times a day  - Ambulate patient 3 times a day  - Out of bed to chair 3 times a day   - Out of bed for meals 3 times a day  - Out of bed for toileting  - Record patient progress and toleration of activity level   Outcome: Progressing  Goal: Patient will remain free of falls  Description: INTERVENTIONS:  - Educate patient/family on patient safety including physical limitations  - Instruct patient to call for assistance with activity   - Consult OT/PT to assist with strengthening/mobility   - Keep Call bell within reach  - Keep bed low and locked with side rails adjusted as appropriate  - Keep care items and personal belongings within reach  - Initiate and maintain comfort rounds  - Make Fall Risk Sign visible to staff  - Offer Toileting every 2 Hours, in advance of need  - Initiate/Maintain bed alarm  - Obtain necessary fall risk management equipment:   - Apply yellow socks and bracelet for high fall risk patients  - Consider moving patient to room near nurses station  Outcome: Progressing     Problem: DISCHARGE PLANNING  Goal: Discharge to home or other facility with appropriate resources  Description: INTERVENTIONS:  - Identify barriers to discharge w/patient and caregiver  - Arrange for needed discharge resources and transportation as appropriate  - Identify discharge learning needs (meds, wound care, etc )  - Arrange for interpretive services to assist at discharge as needed  - Refer to Case Management Department for coordinating discharge planning if the patient needs post-hospital services based on physician/advanced practitioner order or complex needs related to functional status, cognitive ability, or social support system  Outcome: Progressing     Problem: Knowledge Deficit  Goal: Patient/family/caregiver demonstrates understanding of disease process, treatment plan, medications, and discharge instructions  Description: Complete learning assessment and assess knowledge base    Interventions:  - Provide teaching at level of understanding  - Provide teaching via preferred learning methods  Outcome: Progressing     Problem: Prexisting or High Potential for Compromised Skin Integrity  Goal: Skin integrity is maintained or improved  Description: INTERVENTIONS:  - Identify patients at risk for skin breakdown  - Assess and monitor skin integrity  - Assess and monitor nutrition and hydration status  - Monitor labs   - Assess for incontinence   - Turn and reposition patient  - Assist with mobility/ambulation  - Relieve pressure over bony prominences  - Avoid friction and shearing  - Provide appropriate hygiene as needed including keeping skin clean and dry  - Evaluate need for skin moisturizer/barrier cream  - Collaborate with interdisciplinary team   - Patient/family teaching  - Consider wound care consult   Outcome: Progressing     Problem: Nutrition/Hydration-ADULT  Goal: Nutrient/Hydration intake appropriate for improving, restoring or maintaining nutritional needs  Description: Monitor and assess patient's nutrition/hydration status for malnutrition  Collaborate with interdisciplinary team and initiate plan and interventions as ordered  Monitor patient's weight and dietary intake as ordered or per policy  Utilize nutrition screening tool and intervene as necessary  Determine patient's food preferences and provide high-protein, high-caloric foods as appropriate       INTERVENTIONS:  - Monitor oral intake, urinary output, labs, and treatment plans  - Assess nutrition and hydration status and recommend course of action  - Evaluate amount of meals eaten  - Assist patient with eating if necessary   - Allow adequate time for meals  - Recommend/ encourage appropriate diets, oral nutritional supplements, and vitamin/mineral supplements  - Order, calculate, and assess calorie counts as needed  - Recommend, monitor, and adjust tube feedings and TPN/PPN based on assessed needs  - Assess need for intravenous fluids  - Provide specific nutrition/hydration education as appropriate  - Include patient/family/caregiver in decisions related to nutrition  Outcome: Progressing

## 2023-02-21 NOTE — PROGRESS NOTES
Progress Note - Geriatric Medicine   Geetha Colon 65 y o  female MRN: [id number]  [facility] Encounter: [id number]      Assessment/Plan:    Ambulatory dysfunction with fall  -hx recurrent falls, numerous over weeks prior to admit  -requires use walker for ambulation at baseline   -remains high risk recurrent falls, cont fall precautions   -strongly recommend home fall risk assessment if returning home following hosp/rehab   -PT/OT following     Heel pain  -no injuries noted  -likely from pressure on bed, offloaded with pillow, consider allevyn dressings and offloading to reduce risk pressure injuries  -cont good skin cares      Subdural hematoma  -s/p fall as above  -CTH on initial presentation reports hyperdensity along the right paramedian posterior falx, hemorrhage cannot be entirely excluded  -repeat [facility] 2/19 reports lesion no longer present, likely due to volume averaging per radiology reports  -Nsx has evaluated and signed off      Chronic dislocation shoulders bilaterally  -continue acute pain control  -Neurovascular checks per protocol  -Ortho recommends keeping previously scheduled o/p f/u     Dementia without behavioral disturbance   -mild and progressive  -appears to be back to baseline today, brief episode of confusion has cleared but remains high risk recurrence, cont delirium precautions   -independent with ADLs and iADLs at baseline  -[facility] 14/30 (12/21), consider repeat following recovery from acute injuries to establish new baseline   -CTH on admission personally reviewed, reveals at least moderate chronic microangiopathic changes  -Continue treatment hypothyroidism  -No B12 on record review, consider checking with routine labs  -Encourage use of sensory assist devices such as corrective lenses at all appropriate times to reduce risk sensory impairment contributing to isolation, confusion, encephalopathy and more precipitous cognitive decline  -Underlying cognitive impairment markedly increases risk developing delirium during hospitalization, continue strict precautions     Hypothyroidism  -TSH markedly elevated at 16 40 (11/22), recommend re-check  -Continue home levothyroxine dosing with dose adjustments pending repeat TSH as above     SLE  -follows Rheumatology, last seen 2/17/23  -continue home Plaquenil, methotrexate, and folic acid regimen     RLS  -Iron panel 11/22 WNL  -maintained on ropinirole and pamelor with good symptom control  -cautious use pamelor use in older adult as may contribute to confusion and falls     Osteoporosis   -In setting of chronic steroid use and vitamin D deficiency  -Currently on treatment with Prolia, last injection 11/22  -Continue close outpatient follow-up with Rheumatology for ongoing management     Vitamin D deficiency  -Most recent level 24 9 (11/22)   -Continue daily vitamin D supplementation  -Continue diet rich in calcium vitamin D with supplementation for needs unable to be met by diet alone     Impaired Vision  -recommend use of corrective lenses at all appropriate times  -consider large font for printed materials provided to patient      Deconditioning/debility/frailty  -Clinical frailty scale stage V, mildly frail  -encourage well balanced nutrition  -optimize chronic conditions  -PT/OT for strengthening and conditioning      High risk developing delirium   -cont delirium precautions  -ensure pain controlled  -maintain norm circadian rhythm  -monitor for fecal and urinary retention     Care coordination: rounded with Elieser (RN)    Subjective:     Giselle Bautista is seen and examined at bedside where she is sitting resting, she reports being much more comfortable than yesterday  She slept well overnight and shoulder pain is now well controlled, she does however report pain in her heels bilaterally from pressure on the mattress which is somewhat alleviated with offloading by pillow under legs   She offers no additional acute complaints, no acute events reported overnight  Review of Systems   Constitutional: Negative  Negative for appetite change, chills and fever  HENT: Negative  Eyes: Negative  Respiratory: Negative  Cardiovascular: Negative  Gastrointestinal: Negative  Genitourinary: Negative  Musculoskeletal: Positive for gait problem  Shoulder pain improved bilat    Pain in heels bilaterally    Skin: Negative  Neurological: Negative for dizziness, weakness, light-headedness and headaches  Hematological: Negative  Psychiatric/Behavioral: Negative  Negative for sleep disturbance  All other systems reviewed and are negative  Objective:     Vitals: Blood pressure 113/74, pulse 74, temperature 98 4 °F (36 9 °C), resp  rate 16, height 4' 9 5" (1 461 m), weight 43 5 kg (95 lb 14 4 oz), SpO2 97 %, currently breastfeeding  ,Body mass index is 20 39 kg/m²  Intake/Output Summary (Last 24 hours) at 2/21/2023 1204  Last data filed at 2/21/2023 1154  Gross per 24 hour   Intake 298 ml   Output 1700 ml   Net -1402 ml       Current Medications: Reviewed    Physical Exam:   Physical Exam  Vitals and nursing note reviewed  Constitutional:       General: She is not in acute distress  Comments: Thin frail elderly female    HENT:      Head: Normocephalic and atraumatic  Nose: Nose normal       Mouth/Throat:      Mouth: Mucous membranes are dry  Eyes:      General: No scleral icterus  Right eye: No discharge  Left eye: No discharge  Conjunctiva/sclera: Conjunctivae normal    Neck:      Comments: Trachea midline  Cardiovascular:      Rate and Rhythm: Normal rate  Pulmonary:      Effort: Pulmonary effort is normal  No respiratory distress  Breath sounds: No wheezing  Abdominal:      General: There is no distension  Tenderness: There is no abdominal tenderness  Comments: Bowel sounds slow but present    Musculoskeletal:      Cervical back: Neck supple  Right lower leg: No edema  Left lower leg: No edema  Comments: Chronic bony changes shoulders bilaterally R>L    Diffuse severe subcutaneous fat and muscle wasting    Skin:     General: Skin is warm and dry  Comments: No redness or skin breakdown noted on heels     Thin and friable   Neurological:      Mental Status: She is alert  Comments: Awake and alert, oriented and ans ques appropriately, speech clear and fluent, appears to be back to baseline    Psychiatric:         Mood and Affect: Mood normal          Behavior: Behavior normal       Comments: Calm and cooperative        Invasive Devices     Peripheral Intravenous Line  Duration           Peripheral IV 02/20/23 Dorsal (posterior); Left Hand 1 day          Drain  Duration           External Urinary Catheter 1 day              Lab, Imaging and other studies: I have personally reviewed pertinent reports

## 2023-02-21 NOTE — ASSESSMENT & PLAN NOTE
Pt presented s/p fall  Initial CTH showed tiny focus of hyperdensity along right paramedian posterior falx concerning for small cortical contusion/SDH  Repeat CTH today with no definite evidence for acute intracranial hemorrhage   Previously described small focus of high attenuation along the posterior parasagittal falx has either resolved or was secondary to volume averaging as previously      Plan:  NSGY - signed off, no indication for intervention  • PT/OT - recs rehab, will discuss with CM  • DVT PPX: Lovenox

## 2023-02-22 VITALS
DIASTOLIC BLOOD PRESSURE: 70 MMHG | BODY MASS INDEX: 20.13 KG/M2 | TEMPERATURE: 98.3 F | RESPIRATION RATE: 18 BRPM | HEIGHT: 58 IN | SYSTOLIC BLOOD PRESSURE: 123 MMHG | WEIGHT: 95.9 LBS | HEART RATE: 75 BPM | OXYGEN SATURATION: 99 %

## 2023-02-22 RX ADMIN — ENOXAPARIN SODIUM 40 MG: 40 INJECTION SUBCUTANEOUS at 08:52

## 2023-02-22 RX ADMIN — ACETAMINOPHEN 650 MG: 325 TABLET ORAL at 01:30

## 2023-02-22 RX ADMIN — OXYCODONE HYDROCHLORIDE 5 MG: 5 TABLET ORAL at 06:10

## 2023-02-22 RX ADMIN — METHOCARBAMOL 500 MG: 500 TABLET ORAL at 08:59

## 2023-02-22 NOTE — DISCHARGE SUMMARY
[facility]  Discharge- Geetha Colon 1945, 65 y o  female MRN: [id number]  Unit/Bed#: [facility] 604-01 Encounter: [id number]  Primary Care Provider: Vipul Montez MD   Date and time admitted to hospital: 2/19/2023  1:06 AM    Ambulatory dysfunction  Assessment & Plan  Patient has fallen many times over the last several months  PT/OT is recommending IP rehab but patient is refusing  Discharge home today with family and [address] [place] [address] Arslan Esposito today, per Case management  * Concern for cerebral contusion  Assessment & Plan  Pt presented s/p fall  Initial CTH showed tiny focus of hyperdensity along right paramedian posterior falx concerning for small cortical contusion/SDH  Repeat CTH on 2/19 shows no definite evidence for acute intracranial hemorrhage  Previously described small focus of high attenuation along the posterior parasagittal falx has either resolved or was secondary to volume averaging as previously    - Neurosurgery signed off, no f/u needed   - GCS 15,non-focal   - patient refusing inpatient rehab, as recommended by PT/OT  Will d/c home with family today  Chronic Bilateral Shoulder Dislocations  - per orthopedics, no further work up or intervention at this time  - patient follows with  orthopedics for this and is recommended against repeat reductions due to high chance of failure  - pain control with over the counter analgesics  - f/u with Dr Kanika Do as scheduled on 2/23/23        Medical Problems     Resolved Problems  Date Reviewed: 2/22/2023   None         Admission Date:   Admission Orders (From admission, onward)     Ordered        02/19/23 0627  Inpatient Admission  Once                        Admitting Diagnosis: Subdural hematoma [S06  5XAA]  SDH (subdural hematoma) [S06  5XAA]  Age-related osteoporosis without current pathological fracture [M81 0]  Dislocation of right shoulder joint, initial encounter [S43 004A]  Fall, subsequent encounter [W19 XXXD]  Fall at home, subsequent encounter [W19  XXXD, B81 784]  Chronic dislocation of shoulder [M24 419]  Unspecified multiple injuries, initial encounter [T07  XXXA]    HPI: As documented by Dr Cris Martinez who evaluated the patient on admission, 'Dea Santiago is a 65 y o  female with chronic bilateral shoulder dislocations with reverse shoulder arthroplasty, severe arthritis and osteoporosis, who presents s/p fall patient lives in a home with either elderly residents, where she is staying frequent falls  Per chart review she is documented to have fallen at least 3 times in the past 24 hours  Patient states she was in the kitchen when she turned around and lost her balance causing her to fall backwards  She endorses new onset head pain, but states that her chest back and shoulder pain are all at baseline  CT scan performed showed a tiny focus of hyperdensity along the right paramedian posterior falx with inability to rule out bleed, as well as bilateral reverse shoulder arthroplasties, both of which appear to be dislocated "    Procedures Performed: No orders of the defined types were placed in this encounter  Summary of Hospital Course: Patient was placed on the trauma service s/p fall with possible cerebral intraparenchymal hemorrhage  She was seen by neurosurgery and had a f/u CT head on 2/19 which showed either resolutoin of hte IPH or that it was artifact originally  She remianed a GCS 15 and non-focal  She was on no AC/AP medications  She has a h/o chronic bilateral shoulder dislocations for which she follows with orthopedics for  Orthopedics was engaged and recommended no further imaging or intervention at this time, as it was felt by Dr Jaylin Andrade that repeated attempts at reduction would likely be unsuccessful  She has an appt scheduled with Dr Jaylin Andrade on 2/23/23 for follow up of this and she was recommended to keep that appt  She was up and ambulatory with PT/OT who recommended inpatient rehab  Patient, who is of sound mind, refused and family was agreeable to take her home as well  She had [address] set up by A[facility]  She is medically stable for d/c today  She can f/u with neurosurgery and trauma on an as needed basis only and should follow up with Dr Mg Zuniga as scheduled  Today the patient is feeling well  She denies headache/dizziness or pain  She is motivated to go home  Exam:  Vitals:    02/22/23 0733   BP: 123/70   Pulse: 75   Resp: 18   Temp: 98 3 °F (36 8 °C)   SpO2: 99%     GEN: NAD  HEENT: NCAT  NEURO: GCS 15,non-focal  CV: RRR, no MGR  PULM: CTA bilaterally  GI: soft,non-tender, non-distended  : voiding  MSK: + chronic bilateral shoulder dislocations, NVI distally in B/L UE  SKIN: pink, warm ,dry      Significant Findings, Care, Treatment and Services Provided:   XR shoulder 2+ vw right    Result Date: 2/19/2023  Impression: Anterior right shoulder dislocation after reverse total shoulder arthroplasty  No fracture identified Chronic AC separation as above  Workstation performed: [id number]     CT head wo contrast    Result Date: 2/20/2023  Impression: No definite evidence for acute intracranial hemorrhage on the current examination  Previously described small focus of high attenuation along the posterior parasagittal falx has either resolved or was secondary to volume averaging as previously suggested  Workstation performed: [id number]     CT head without contrast    Result Date: 2/19/2023  Impression: There is a tiny focus of hyperdensity along the right paramedian posterior falx  Although this may be related to volume averaging with a calcification, a tiny focus of hemorrhage cannot be entirely excluded  Please see detailed discussion above  Short-term follow-up is recommended  The  image demonstrates bilateral reverse shoulder arthroplasties, both of which appear to be dislocated  Clinical correlation is recommended    If further evaluation is necessary, plain film imaging could be performed  I personally discussed this study with Joe Vieira on 2/19/2023 at 4:31 AM  Workstation performed: [id number]     CT spine cervical without contrast    Result Date: 2/19/2023  Impression: No acute cervical spine fracture or traumatic malalignment  The  image demonstrates bilateral reverse shoulder arthroplasties, both of which appear to be dislocated  Clinical correlation is recommended  If further evaluation is indicated, plain film imaging could be performed  I personally discussed this study with Joe Vieira on 2/19/2023 at 4:47 AM   Workstation performed: [id number]       Complications: none    Condition at Discharge: good         Discharge instructions/Information to patient and family:   See after visit summary for information provided to patient and family  Provisions for Follow-Up Care:  See after visit summary for information related to follow-up care and any pertinent home health orders  PCP: Sanam Khoury MD    Disposition: Home with family    Planned Readmission: No    Discharge Statement   I spent 25 minutes discharging the patient  This time was spent on the day of discharge  I had direct contact with the patient on the day of discharge  Additional documentation is required if more than 30 minutes were spent on discharge  Discharge Medications:  See after visit summary for reconciled discharge medications provided to patient and family

## 2023-02-22 NOTE — ASSESSMENT & PLAN NOTE
Pt presented s/p fall  Initial CTH showed tiny focus of hyperdensity along right paramedian posterior falx concerning for small cortical contusion/SDH  Repeat CTH on 2/19 shows no definite evidence for acute intracranial hemorrhage  Previously described small focus of high attenuation along the posterior parasagittal falx has either resolved or was secondary to volume averaging as previously    - Neurosurgery signed off, no f/u needed   - GCS 15,non-focal   - patient refusing inpatient rehab, as recommended by PT/OT  Will d/c home with family today

## 2023-02-22 NOTE — ASSESSMENT & PLAN NOTE
Patient has fallen many times over the last several months     PT/OT is recommending IP rehab but patient is refusing  Discharge home today with family

## 2023-02-22 NOTE — DISCHARGE INSTR - AVS FIRST PAGE
Seek medical attention if you develop worsening headaches, dizziness, visual changes, persistent nausea/vomiting, numbness/weakness/tingling of the extremities  Avoid repeat head trauma for 6 weeks  Slowly re-introduce regular activity otherwise as tolerated  Please call the office with any concerns

## 2023-02-22 NOTE — CASE MANAGEMENT
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[facility] [address] ) 10/14/2022   • Non-healing open wound of heel, initial encounter 10/12/2022   • Pain 2022   • Anemia 2022   • Pressure injury of left foot, stage 4 (HCC)    • Hyponatremia 2022   • Venous insufficiency 2021   • Shoulder pain, bilateral 2021   • Failed orthopedic implant ([facility] [address] ) 2021   • S/P reverse total shoulder arthroplasty, left 2021   • Rupture long head biceps tendon, left, initial encounter 2021   • Shoulder dislocation 2021   • Depression 2020   • Other forms of systemic lupus erythematosus ([facility] [address] ) 2020   • Dyspepsia 2019   • Vitamin D deficiency 2019   • Chronic pain syndrome 2019   • Rheumatoid arthritis involving multiple sites with positive rheumatoid factor ([facility] ) 2019   • Ambulatory dysfunction 2018   • Closed compression fracture of L3 lumbar vertebra 2018   • S/P total hip arthroplasty 2017   • Anxiety 2017   • RLS (restless legs syndrome) 2017   • RBBB 2017   • Age-related osteoporosis without current pathological fracture 2017   • Hypothyroidism due to Hashimoto's thyroiditis 2016      LOS (days): 3  Geometric Mean LOS (GMLOS) (days): 2 10  Days to GMLOS:-1 1     OBJECTIVE:  Risk of Unplanned Readmission Score: 15 86         Current admission status: Inpatient   Preferred Pharmacy:   [address] [place] [address]  Phone: [phone] Fax: [phone]    Primary Care Provider: Lambert Metzger MD    Primary Insurance: MEDICARE  Secondary Insurance:     DISCHARGE DETAILS:      [address]         Is the patient interested in [facility] at discharge?: Yes  [address] requested[de-identified] Physical Therapy, Nursing, [facility] Name[de-identified] [facility] Provider[de-identified] PCP  Home Health Services Needed[de-identified] Strengthening/Theraputic Exercises to Improve Function, Gait/ADL Training, Evaluate Functional Status and Safety  Homebound Criteria Met[de-identified] Uses an Assist Device (i e  cane, walker, etc), Requires the Assistance of Another Person for Safe Ambulation or to Leave the Home  Supporting Clincal Findings[de-identified] Limited Endurance, Fatigues Easliy in Short Distances    DME Referral Provided  Referral made for DME?: No        Cm met with pt to discuss d/c planning  Pt is refusing all IP rehab and wishes for a home d/c with continued VNA through [facility]  CM also talked with pt's niece and she reluctantly is in agreement with pt  [facility] will reopen the pt for VNA     Pt's niece will transport home

## 2023-02-23 ENCOUNTER — HOME CARE VISIT (OUTPATIENT)
Dept: HOME HEALTH SERVICES | Facility: HOME HEALTHCARE | Age: 78
End: 2023-02-23

## 2023-02-23 ENCOUNTER — TRANSITIONAL CARE MANAGEMENT (OUTPATIENT)
Dept: FAMILY MEDICINE CLINIC | Facility: CLINIC | Age: 78
End: 2023-02-23

## 2023-02-23 NOTE — CASE COMMUNICATION
TC to Schedule SUMMER for tomorrow Zhao Meza reports patient admitted to [facility] in [facility] today

## 2023-02-25 ENCOUNTER — NURSING HOME VISIT (OUTPATIENT)
Dept: GERIATRICS | Facility: OTHER | Age: 78
End: 2023-02-25

## 2023-02-25 DIAGNOSIS — E03.8 HYPOTHYROIDISM DUE TO HASHIMOTO'S THYROIDITIS: ICD-10-CM

## 2023-02-25 DIAGNOSIS — M25.512 CHRONIC PAIN OF BOTH SHOULDERS: ICD-10-CM

## 2023-02-25 DIAGNOSIS — G89.29 CHRONIC PAIN OF BOTH SHOULDERS: ICD-10-CM

## 2023-02-25 DIAGNOSIS — G25.81 RLS (RESTLESS LEGS SYNDROME): ICD-10-CM

## 2023-02-25 DIAGNOSIS — G89.4 CHRONIC PAIN SYNDROME: ICD-10-CM

## 2023-02-25 DIAGNOSIS — M25.511 CHRONIC PAIN OF BOTH SHOULDERS: ICD-10-CM

## 2023-02-25 DIAGNOSIS — D64.9 ANEMIA, UNSPECIFIED TYPE: ICD-10-CM

## 2023-02-25 DIAGNOSIS — E06.3 HYPOTHYROIDISM DUE TO HASHIMOTO'S THYROIDITIS: ICD-10-CM

## 2023-02-25 DIAGNOSIS — E55.9 VITAMIN D DEFICIENCY: Chronic | ICD-10-CM

## 2023-02-25 DIAGNOSIS — M05.79 RHEUMATOID ARTHRITIS INVOLVING MULTIPLE SITES WITH POSITIVE RHEUMATOID FACTOR (HCC): Primary | ICD-10-CM

## 2023-02-25 NOTE — PROGRESS NOTES
[address]  [address] [place] [address]  Facility: [address] [facility] and [address] [address]    HISTORY AND PHYSICAL    NAME: Geetha Rivera  AGE: 65 y o  SEX: female    DATE OF ENCOUNTER: 2/25/2023    Code status:  CPR    Assessment and Plan     1  Rheumatoid arthritis involving multiple sites with positive rheumatoid factor (HCC)  Assessment & Plan:  · We will continue her prior to admission weekly methotrexate and hydroxychloroquine  · Niece and patient confirm that her rheumatologist discontinued her daily prednisone  · We will reach out to her rheumatologist further information  Secondary to her decreased level of functioning from baseline, she will be admitted to the subacute rehabilitation facility and seen in consultation by a multidisciplinary rehabilitation team for evaluation and treatment to assist her in returning to her prior level of functioning  We will continue with multimodal pain management  We will continue with monitoring for change in her condition  She will follow-up with her PCP and rheumatology services upon discharge      2  Chronic pain of both shoulders  Assessment & Plan:  · Following with her orthopedic service as an outpatient  · We will continue with multimodal pain management  · We will continue with monitoring for change in her condition      3  RLS (restless legs syndrome)  Assessment & Plan:  · We will continue her prior to admission bedtime ropinirole and nortriptyline  · We will continue with monitoring for change in her condition      4   Chronic pain syndrome  Assessment & Plan:  · We will continue with multimodal pain management  · We will discontinue oxycodone secondary to family noting confusion and decreased functional status (ability to ambulate)  · We will continue prior to admission nortriptyline and as needed ibuprofen  Secondary to her decreased level of functioning from baseline, she will be admitted to the subacute rehabilitation facility and seen in consultation by a multidisciplinary rehabilitation team for evaluation and treatment to assist her in returning to her prior level of functioning  We will continue with monitoring for change in her condition  She will follow-up with her PCP and rheumatology services upon discharge      5  Hypothyroidism due to Hashimoto's thyroiditis  Assessment & Plan:  · November 30, 2022: TSH: 16 4  · We will continue her prior to admission levothyroxine 50 mcg daily  · We will update her laboratory values  · She will follow-up with her PCP upon discharge      6  Vitamin D deficiency  Assessment & Plan:  · November 30, 2022: Vitamin D 25-hydroxy level: 24 9  · We will continue her prior to admission vitamin D3 50 mcg daily  · She will follow-up with her PCP upon discharge      7  Anemia, unspecified type  Assessment & Plan:  · February 19, 2023: Hemoglobin/hematocrit: 9 6/30 5  · Needs confirms that she is no longer taking oral iron replacement therapy  · Iron studies normal in 2022  · We will repeat her iron studies  · We will continue with clinical and periodic laboratory monitoring for change in her condition  · She will follow-up with her PCP upon discharge      See recent hospital discharge summary notes for further information  All medications and routine orders were reviewed and updated as needed  Plan discussed with: Patient, nursing staff, and Julián hughes  Chief Complaint     She is seen for a visit to perform a history and physical exam to be admitted to the subacute rehabilitation facility  History of Present Illness     History is obtained from medical record review, patient, and with the patient's permission her nieceJulián      She is a 26-year-old woman with the comorbidities of rheumatoid arthritis, vitamin D deficiency, restless leg syndrome, chronic bilateral shoulder dislocations, hypothyroidism, osteoporosis, and anemia who is seen with female nursing staff for a visit to perform a history and physical exam to be admitted to the subacute rehabilitation facility  Her niece notes the patient was admitted to the hospital from February 6, 2023 through February 8, 2023 after a fall  At that time, she gave informed refusal regarding a postacute care rehabilitation stay  She presented to the emergency room again on February 19, 2023 after another fall  Evaluation in the emergency room revealed a concern regarding a small cortical contusion/subdural hematoma  She was admitted to the [facility] hospital from February 19, 2023 through February 22, 2023  She was seen in consultation by the neurosurgery, geriatric, and therapy services during her admission  Repeat brain imaging failed to demonstrate the small cortical contusion/subdural hematoma  Therapy recommended a postacute care rehabilitation stay, but the patient gave informed refusal   After 1 day at home the patient realized that she needs more help with care  Case management at the hospital recommended the subacute rehabilitation facility  She presented to the subacute rehabilitation facility on February 23, 2023  She notes chronic pain in both of her shoulders  She notes a history of restless leg syndrome  She reports difficulty sleeping secondary to shoulder pain and restless legs  She notes constipation  She would like to take something routinely to help  I reviewed her medication list with her niece  Her niece reports that her aunt is showing signs of confusion and decreased functioning with oxycodone  She reports that her aunt uses ibuprofen 4 mg twice daily at home for pain  HISTORY:  Past Medical History:   Diagnosis Date   • Acute blood loss anemia 9/9/2020   • Aftercare following joint replacement 9/9/2020    Patient had right reversed total shoulder arthroplasty   Pain was controlled when he left the hospital      • Anxiety    • Arthritis    • Depression    • Disease of thyroid gland     HYPO   • Femur neck fracture (HCC)    • GERD (gastroesophageal reflux disease)    • Hyperthyroidism     RESOLVED: [id number]   • Osteoporosis    • Polio    • PVD (peripheral vascular disease) (HCC)    • Right BBB/left ant fasc block    • UTI (urinary tract infection)    • Varicella infection     LAST ASSESSED: [id number]     Past Surgical History:   Procedure Laterality Date   • APPENDECTOMY     • HIP ARTHROPLASTY Left 11/27/2017    Procedure: REMOVAL IM NAIL CONVERSION TO TOTAL HIP ARTHROPLASTY POSTERIOR APPROACH;  Surgeon: Lucy Bartlett MD;  [facility];  Service: Orthopedics   • HIP FRACTURE SURGERY     • HIP SURGERY      both hips replaced;2013 left ,2014 right   • JOINT REPLACEMENT Right     HIP TOTAL   • ID ARTHROPLASTY GLENOHUMERAL JOINT TOTAL SHOULDER Right 9/2/2020    Procedure: ARTHROPLASTY SHOULDER REVERSE;  Surgeon: Princess Schneider MD;  [facility];  Service: Orthopedics   • ID ARTHROPLASTY GLENOHUMERAL JOINT TOTAL SHOULDER Left 6/1/2021    Procedure: ARTHROPLASTY SHOULDER REVERSE;  Surgeon: Princess Schneider MD;  [facility];  Service: Orthopedics   • ID CONV PREV HIP TOT HIP ARTHRP W/WO AGRFT/ALGRFT Left 10/4/2017    Procedure: Fernando Sorrow removal of left hip;  Surgeon: Lucy Bartlett MD;  Location[facility];  Service: Orthopedics   • ID [id number] [address] Fl[address] WRST SURG W/RLS TRANSVRS CARPL LIGM Right 5/24/2022    Procedure: RELEASE CARPAL TUNNEL ENDOSCOPIC-right;  Surgeon: Tucker Tinajero MD;  [facility];  Service: Orthopedics   • REVISION TOTAL HIP ARTHROPLASTY Right    • TOE AMPUTATION Left 7/24/2022    Procedure: 5TH METATARSAL RESECTION WITH BOTTOM FLAP CLOSURE;  Surgeon: Garima Johnson DPM;  Location[facility];  Service: Podiatry   • TONSILLECTOMY       Family History   Problem Relation Age of Onset   • Rheum arthritis Mother    • Rheum arthritis Sister    • Prostate cancer Brother      Social History     Socioeconomic History   • Marital status:  Spouse name: Not on file   • Number of children: 1   • Years of education: Not on file   • Highest education level: Not on file   Occupational History   • Occupation: RETIRED    Tobacco Use   • Smoking status: Former   • Smokeless tobacco: Never   • Tobacco comments:     quit 20-30 years ago   Vaping Use   • Vaping Use: Never used   Substance and Sexual Activity   • Alcohol use: Not Currently   • Drug use: Not Currently   • Sexual activity: Not Currently   Other Topics Concern   • Not on file   Social History Narrative    Always uses seat belt    Caffeine use    Sikhism    Single as per all scripts      Social Determinants of Health     Financial Resource Strain: Not on file   Food Insecurity: No Food Insecurity   • Worried About Running Out of Food in the Last Year: Never true   • Ran Out of Food in the Last Year: Never true   Transportation Needs: No Transportation Needs   • Lack of Transportation (Medical): No   • Lack of Transportation (Non-Medical): No   Physical Activity: Not on file   Stress: Not on file   Social Connections: Not on file   Intimate Partner Violence: Not on file   Housing Stability: Low Risk    • Unable to Pay for Housing in the Last Year: No   • Number of Places Lived in the Last Year: 1   • Unstable Housing in the Last Year: No       Allergies:  No Known Allergies    Review of Systems     Review of Systems   Constitutional: Negative for appetite change (She reports her baseline weight to be between 92 pounds to 95 pounds)  Respiratory: Negative for shortness of breath  Cardiovascular: Negative for chest pain  Musculoskeletal: Positive for arthralgias  Psychiatric/Behavioral: Positive for sleep disturbance (Secondary to pain)  Medications and orders     All medications reviewed and updated in Nursing Home EMR  Objective     Vitals: Weight 92 8 pounds, temperature 98 3 °F, pulse 74, blood pressure 120/58, pulse oximetry 97% on room air      Physical Exam  Vitals reviewed  Exam conducted with a chaperone present  Constitutional:       General: She is awake  She is not in acute distress  Appearance: She is well-developed  She is not toxic-appearing or diaphoretic  Comments: She appears comfortable lying in bed, her stated age, and very frail  HENT:      Mouth/Throat:      Mouth: Mucous membranes are moist    Eyes:      General: No scleral icterus  Conjunctiva/sclera: Conjunctivae normal    Cardiovascular:      Rate and Rhythm: Normal rate and regular rhythm  Comments: There is no edema in her lower extremities  Pulmonary:      Effort: No respiratory distress  Breath sounds: Normal breath sounds  No stridor  No wheezing, rhonchi or rales  Abdominal:      General: Abdomen is flat  There is no distension  Palpations: Abdomen is soft  There is no mass  Tenderness: There is no abdominal tenderness  There is no guarding  Musculoskeletal:      Comments: Decreased active range of motion of bilateral shoulders   Skin:     Findings: No bruising or rash  Neurological:      Mental Status: She is alert  Comments: Cognition is grossly intact  Psychiatric:         Behavior: Behavior normal  Behavior is cooperative  Pertinent Laboratory/Diagnostic Studies: The following labs/studies were reviewed please see chart or hospital paperwork for details      Lab Results   Component Value Date    WBC 8 39 02/19/2023    HGB 9 6 (L) 02/19/2023    HCT 30 5 (L) 02/19/2023    MCV 88 02/19/2023     02/19/2023     Lab Results   Component Value Date    SODIUM 138 02/19/2023    K 4 4 02/19/2023     02/19/2023    CO2 20 (L) 02/19/2023    BUN 17 02/19/2023    CREATININE 0 56 (L) 02/19/2023    GLUC 92 02/19/2023    CALCIUM 9 0 02/19/2023     Lab Results   Component Value Date    MDH5GDXIPUEV 16 400 (H) 11/30/2022 February 19, 2023:    CT of head without contrast:    IMPRESSION:     No definite evidence for acute intracranial hemorrhage on the current examination  Previously described small focus of high attenuation along the posterior parasagittal falx has either resolved or was secondary to volume averaging as previously   suggested  February 19, 2023:    Twelve-lead ECG:    Previous ECG:     Previous ECG:  Compared to current     Similarity:  No change   Interpretation:     Interpretation: normal     Rate:     ECG rate assessment: normal     Rhythm:     Rhythm: sinus rhythm     Ectopy:     Ectopy: none     QRS:     QRS axis:  Normal   Conduction:     Conduction: normal     ST segments:     ST segments:  Normal   T waves:     T waves: normal      - Her order summary was reviewed and signed  Portions of the record may have been created with voice recognition software  Occasional wrong word or "sound a like" substitutions may have occurred due to the inherent limitations of voice recognition software  Read the chart carefully and recognize, using context, where substitutions have occurred      JACEY Phan   2/26/2023 9:30 PM

## 2023-02-26 PROBLEM — R52 PAIN: Status: RESOLVED | Noted: 2022-08-09 | Resolved: 2023-02-26

## 2023-02-26 PROBLEM — M05.79 RHEUMATOID ARTHRITIS INVOLVING MULTIPLE SITES WITH POSITIVE RHEUMATOID FACTOR (HCC): Chronic | Status: ACTIVE | Noted: 2019-03-04

## 2023-02-26 RX ORDER — ASCORBATE CALCIUM 500 MG
500 TABLET ORAL DAILY
COMMUNITY

## 2023-02-26 RX ORDER — IBUPROFEN 400 MG/1
400 TABLET ORAL EVERY 6 HOURS PRN
COMMUNITY

## 2023-02-26 RX ORDER — LANOLIN ALCOHOL/MO/W.PET/CERES
3 CREAM (GRAM) TOPICAL
COMMUNITY

## 2023-02-26 NOTE — ED ATTENDING ATTESTATION
2/19/2023  I, Verdia Cabot, DO, saw and evaluated the patient  I have discussed the patient with the resident/non-physician practitioner and agree with the resident's/non-physician practitioner's findings, Plan of Care, and MDM as documented in the resident's/non-physician practitioner's note, except where noted  All available labs and Radiology studies were reviewed  I was present for key portions of any procedure(s) performed by the resident/non-physician practitioner and I was immediately available to provide assistance  At this point I agree with the current assessment done in the Emergency Department  I have conducted an independent evaluation of this patient a history and physical is as follows      65 y o  female presents for multiple falls  The patient is living at home with multiple elderly people  Really not able to take care of herself  Had multiple falls recently  She fell 3 times today  She states that she has poor balance  She hit her head on the last fall  She called EMS because she could not get up  She has chronic bilateral shoulder dislocation      Exam:  Vitals reviewed and within normal limits  Bilateral shoulder deformities  No external head trauma  Lungs are clear  Patient is somewhat cachectic  Normal neurologic exam         Impression[de-identified] Fall, ambulatory dysfunction      Differential diagnosis: Intracranial hemorrhage, cervical spine fracture, shoulder fracture, metabolic derangement, anemia,         Plan:    CT head neck x-ray both shoulders lab work              External records independently reviewed by me    History obtained from patient  numerous social barriers to care including no primary caretakers, health literacy problem, refusing help previous ER visits       CT head interpreted by me as no acute subdural possible subarachnoid  Right shoulder x-ray interpreted by me as chronically dislocated without fracture  Left shoulder x-ray interpreted by me as chronically dislocated without fracture              ED Course     Patient found to have a possible small amount of subarachnoid hemorrhage, will admit to the trauma service    Critical Care Time  ECG 12 Lead Documentation Only    Date/Time: 2/19/2023 4:26 PM  Performed by: Dimas Chery DO  Authorized by: Dimas Chery DO     ECG reviewed by me, the ED Provider: yes    Patient location:  ED  Previous ECG:     Previous ECG:  Compared to current    Similarity:  No change  Interpretation:     Interpretation: normal    Rate:     ECG rate assessment: normal    Rhythm:     Rhythm: sinus rhythm    Ectopy:     Ectopy: none    QRS:     QRS axis:  Normal  Conduction:     Conduction: normal    ST segments:     ST segments:  Normal  T waves:     T waves: normal    CriticalCare Time  Performed by: Dimas Chery DO  Authorized by: Dimas Chery DO     Critical care provider statement:     Critical care time (minutes):  30    Critical care time was exclusive of:  Separately billable procedures and treating other patients and teaching time    Critical care was necessary to treat or prevent imminent or life-threatening deterioration of the following conditions:  Trauma    Critical care was time spent personally by me on the following activities:  Blood draw for specimens, obtaining history from patient or surrogate, re-evaluation of patient's condition, review of old charts, evaluation of patient's response to treatment, examination of patient, ordering and review of laboratory studies, ordering and performing treatments and interventions, development of treatment plan with patient or surrogate and ordering and review of radiographic studies

## 2023-02-27 NOTE — ASSESSMENT & PLAN NOTE
· February 19, 2023: Hemoglobin/hematocrit: 9 6/30 5  · Needs confirms that she is no longer taking oral iron replacement therapy  · Iron studies normal in 2022  · We will repeat her iron studies  · We will continue with clinical and periodic laboratory monitoring for change in her condition  · She will follow-up with her PCP upon discharge
· Following with her orthopedic service as an outpatient  · We will continue with multimodal pain management  · We will continue with monitoring for change in her condition
· November 30, 2022: TSH: 16 4  · We will continue her prior to admission levothyroxine 50 mcg daily  · We will update her laboratory values  · She will follow-up with her PCP upon discharge
· November 30, 2022: Vitamin D 25-hydroxy level: 24 9  · We will continue her prior to admission vitamin D3 50 mcg daily  · She will follow-up with her PCP upon discharge
· We will continue her prior to admission bedtime ropinirole and nortriptyline  · We will continue with monitoring for change in her condition
· We will continue her prior to admission weekly methotrexate and hydroxychloroquine  · Niece and patient confirm that her rheumatologist discontinued her daily prednisone  · We will reach out to her rheumatologist further information  Secondary to her decreased level of functioning from baseline, she will be admitted to the subacute rehabilitation facility and seen in consultation by a multidisciplinary rehabilitation team for evaluation and treatment to assist her in returning to her prior level of functioning  We will continue with multimodal pain management  We will continue with monitoring for change in her condition  She will follow-up with her PCP and rheumatology services upon discharge
· We will continue with multimodal pain management  · We will discontinue oxycodone secondary to family noting confusion and decreased functional status (ability to ambulate)  · We will continue prior to admission nortriptyline and as needed ibuprofen  Secondary to her decreased level of functioning from baseline, she will be admitted to the subacute rehabilitation facility and seen in consultation by a multidisciplinary rehabilitation team for evaluation and treatment to assist her in returning to her prior level of functioning  We will continue with monitoring for change in her condition  She will follow-up with her PCP and rheumatology services upon discharge
Age appropriate behavior

## 2023-03-01 ENCOUNTER — NURSING HOME VISIT (OUTPATIENT)
Dept: GERIATRICS | Facility: OTHER | Age: 78
End: 2023-03-01

## 2023-03-01 DIAGNOSIS — S43.004A DISLOCATION OF RIGHT SHOULDER JOINT, INITIAL ENCOUNTER: ICD-10-CM

## 2023-03-01 DIAGNOSIS — M05.79 RHEUMATOID ARTHRITIS INVOLVING MULTIPLE SITES WITH POSITIVE RHEUMATOID FACTOR (HCC): Chronic | ICD-10-CM

## 2023-03-01 DIAGNOSIS — S06.33AD: ICD-10-CM

## 2023-03-01 DIAGNOSIS — E03.8 HYPOTHYROIDISM DUE TO HASHIMOTO'S THYROIDITIS: Primary | ICD-10-CM

## 2023-03-01 DIAGNOSIS — E06.3 HYPOTHYROIDISM DUE TO HASHIMOTO'S THYROIDITIS: Primary | ICD-10-CM

## 2023-03-01 DIAGNOSIS — F32.A DEPRESSION, UNSPECIFIED DEPRESSION TYPE: ICD-10-CM

## 2023-03-02 VITALS
BODY MASS INDEX: 19.73 KG/M2 | RESPIRATION RATE: 20 BRPM | SYSTOLIC BLOOD PRESSURE: 136 MMHG | HEART RATE: 74 BPM | DIASTOLIC BLOOD PRESSURE: 75 MMHG | TEMPERATURE: 98.4 F | WEIGHT: 92.8 LBS

## 2023-03-02 NOTE — PROGRESS NOTES
[facility]  [phone]) Shawn [address]  Code 31      NAME: Geetha Rivera  AGE: 65 y o  SEX: female [id number]    DATE OF ENCOUNTER: 3/1/23    CODE STATUS: CPR    Assessment and Plan     Problem List Items Addressed This Visit        Endocrine    Hypothyroidism due to Hashimoto's thyroiditis - Primary     TSH today 7 99  Will increase levothyroxine from 50 to 75mcg  Repeat labs in 6 weeks  Follow up with PCP on discharge            Nervous and Auditory    Concern for cerebral contusion     Recent hospitalization in February after fall  Seen by neurosurg  Continue PT/OT            Musculoskeletal and Integument    Rheumatoid arthritis involving multiple sites with positive rheumatoid factor (HCC) (Chronic)     Continue methotrexate and plaquenil  Follow up with rheumatology         Shoulder dislocation     Chronic  Was seen by ortho during recent hospitalization  Pain controlled            Other    Depression     Continue nortriptyline            No orders of the defined types were placed in this encounter  Chief Complaint     Chief Complaint   Patient presents with   • Geriatric Evaluation     Follow up       History of Present Illness   65year old female being seen today in collaboration with nursing for follow up for chronic conditions  She was recently hospitalized for fall at home and cerebral contusion  She did try to discharge directly home from the hospital but family reconsidered and decided to bring her for short term rehab  She is doing well with therapy       The following portions of the patient's history were reviewed and updated as appropriate: allergies, current medications, past family history, past medical history, past social history, past surgical history and problem list     Review of Systems   Review of Systems   Constitutional: Negative  HENT: Negative  Eyes: Negative  Respiratory: Negative  Cardiovascular: Negative  Gastrointestinal: Negative  Endocrine: Negative  Genitourinary: Negative  Musculoskeletal: Positive for arthralgias and gait problem  Skin: Negative  Allergic/Immunologic: Negative  Hematological: Negative  Psychiatric/Behavioral: Negative  Active Problem List     Patient Active Problem List   Diagnosis   • Anxiety   • RLS (restless legs syndrome)   • S/P total hip arthroplasty   • Hypothyroidism due to Hashimoto's thyroiditis   • Age-related osteoporosis without current pathological fracture   • RBBB   • Ambulatory dysfunction   • Closed compression fracture of L3 lumbar vertebra   • Rheumatoid arthritis involving multiple sites with positive rheumatoid factor ([facility])   • Chronic pain syndrome   • Vitamin D deficiency   • Dyspepsia   • Other forms of systemic lupus erythematosus ([facility] )   • Depression   • Shoulder dislocation   • Rupture long head biceps tendon, left, initial encounter   • S/P reverse total shoulder arthroplasty, left   • Shoulder pain, bilateral   • Failed orthopedic implant ([facility] )   • Venous insufficiency   • Hyponatremia   • Anemia   • Pressure injury of left foot, stage 4 ([facility])   • Non-healing open wound of heel, initial encounter   • 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[facility])   • Leg pain   • Dysphagia   • Concern for cerebral contusion         Objective     /75   Pulse 74   Temp 98 4 °F (36 9 °C)   Resp 20   Wt 42 1 kg (92 lb 12 8 oz)   BMI 19 73 kg/m²     Physical Exam  Vitals reviewed  Constitutional:       General: She is not in acute distress  Appearance: She is not diaphoretic  Comments: Frail appearing   HENT:      Head: Normocephalic and atraumatic  Nose: Nose normal  No congestion  Mouth/Throat:      Mouth: Mucous membranes are moist       Pharynx: Oropharynx is clear  Eyes:      Conjunctiva/sclera: Conjunctivae normal    Cardiovascular:      Rate and Rhythm: Normal rate  Pulmonary:      Effort: Pulmonary effort is normal  No respiratory distress  Breath sounds: Normal breath sounds  No wheezing  Abdominal:      General: Bowel sounds are normal  There is no distension  Palpations: Abdomen is soft  Tenderness: There is no abdominal tenderness  Musculoskeletal:         General: No signs of injury  Cervical back: Neck supple  Right lower leg: No edema  Left lower leg: No edema  Skin:     General: Skin is warm and dry  Findings: No bruising or erythema  Neurological:      Mental Status: She is alert  Mental status is at baseline  Psychiatric:         Mood and Affect: Mood normal          Behavior: Behavior normal          Pertinent Laboratory/Diagnostic Studies:    hgb 10 9  tsh 7 99    Current Medications   Medications reviewed and updated in facility chart

## 2023-03-02 NOTE — ASSESSMENT & PLAN NOTE
TSH today 7 99  Will increase levothyroxine from 50 to 75mcg  Repeat labs in 6 weeks  Follow up with PCP on discharge

## 2023-03-09 ENCOUNTER — NURSING HOME VISIT (OUTPATIENT)
Dept: GERIATRICS | Facility: OTHER | Age: 78
End: 2023-03-09

## 2023-03-09 VITALS
WEIGHT: 92.9 LBS | HEART RATE: 87 BPM | DIASTOLIC BLOOD PRESSURE: 61 MMHG | TEMPERATURE: 98.2 F | SYSTOLIC BLOOD PRESSURE: 110 MMHG | RESPIRATION RATE: 24 BRPM | BODY MASS INDEX: 19.76 KG/M2

## 2023-03-09 DIAGNOSIS — E03.8 HYPOTHYROIDISM DUE TO HASHIMOTO'S THYROIDITIS: Primary | ICD-10-CM

## 2023-03-09 DIAGNOSIS — S06.33AD: ICD-10-CM

## 2023-03-09 DIAGNOSIS — E06.3 HYPOTHYROIDISM DUE TO HASHIMOTO'S THYROIDITIS: Primary | ICD-10-CM

## 2023-03-09 DIAGNOSIS — D64.9 ANEMIA, UNSPECIFIED TYPE: ICD-10-CM

## 2023-03-09 DIAGNOSIS — F41.9 ANXIETY: ICD-10-CM

## 2023-03-09 DIAGNOSIS — S43.006S DISLOCATION OF SHOULDER REGION, UNSPECIFIED LATERALITY, SEQUELA: ICD-10-CM

## 2023-03-09 DIAGNOSIS — M05.79 RHEUMATOID ARTHRITIS INVOLVING MULTIPLE SITES WITH POSITIVE RHEUMATOID FACTOR (HCC): Chronic | ICD-10-CM

## 2023-03-09 NOTE — ASSESSMENT & PLAN NOTE
Recently seen in consultation by psychiatry service due to ongoing anxiety and requests for anti-anxiety medications  Started on buspar 5mg BID, seems to be tolerating well  Will continue to follow

## 2023-03-09 NOTE — PROGRESS NOTES
[facility]  [phone]) [address]  Code 31      NAME: Geetha Rivera  AGE: 65 y o  SEX: female [id number]    DATE OF ENCOUNTER: 3/9/2023    CODE STATUS: CPR    Assessment and Plan     Problem List Items Addressed This Visit        Endocrine    Hypothyroidism due to Hashimoto's thyroiditis - Primary     Continue levothyroxine  Follow up with periodic monitoring of TSH            Nervous and Auditory    Concern for cerebral contusion     S/p fall at home in Feb resulting in hospitalization and subsequent admission to short term rehab  Continue PT/OT  Patient anxious to be discharged home family  Per case management family trying to make arrangements for more care at home  Will continue to follow            Musculoskeletal and Integument    Rheumatoid arthritis involving multiple sites with positive rheumatoid factor (HCC) (Chronic)     Follows with rheumatology at home  Continue plaquenil, methotrexate, motrin         Shoulder dislocation     Chronic shoulder dislocation  Denies pain            Other    Anxiety     Recently seen in consultation by psychiatry service due to ongoing anxiety and requests for anti-anxiety medications  Started on buspar 5mg BID, seems to be tolerating well  Will continue to follow         Anemia     Stable  hgb 10 9            No orders of the defined types were placed in this encounter  Chief Complaint     Chief Complaint   Patient presents with   • Geriatric Evaluation     Follow up       History of Present Illness   65year old female being seen today in collaboration with nursing for follow up for chronic conditions  She is admitted to [facility] for rehab after a hospitalization for fall  She was recently started on buspar for anxiety and is tolerating it well   She hopes to be discharged home with family        The following portions of the patient's history were reviewed and updated as appropriate: allergies, current medications, past family history, past medical history, past social history, past surgical history and problem list     Review of Systems   Review of Systems   Constitutional: Negative  HENT: Negative  Eyes: Negative  Respiratory: Negative  Cardiovascular: Negative  Gastrointestinal: Negative  Endocrine: Negative  Genitourinary: Negative  Musculoskeletal: Positive for arthralgias and gait problem  Allergic/Immunologic: Negative  Hematological: Negative  Psychiatric/Behavioral: Negative  Active Problem List     Patient Active Problem List   Diagnosis   • Anxiety   • RLS (restless legs syndrome)   • S/P total hip arthroplasty   • Hypothyroidism due to Hashimoto's thyroiditis   • Age-related osteoporosis without current pathological fracture   • RBBB   • Ambulatory dysfunction   • Closed compression fracture of L3 lumbar vertebra   • Rheumatoid arthritis involving multiple sites with positive rheumatoid factor ([facility])   • Chronic pain syndrome   • Vitamin D deficiency   • Dyspepsia   • Other forms of systemic lupus erythematosus ([facility] )   • Depression   • Shoulder dislocation   • Rupture long head biceps tendon, left, initial encounter   • S/P reverse total shoulder arthroplasty, left   • Shoulder pain, bilateral   • Failed orthopedic implant ([facility] )   • Venous insufficiency   • Hyponatremia   • Anemia   • Pressure injury of left foot, stage 4 ([facility])   • Non-healing open wound of heel, initial encounter   • 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[facility])   • Leg pain   • Dysphagia   • Concern for cerebral contusion         Objective     /61   Pulse 87   Temp 98 2 °F (36 8 °C)   Resp (!) 24   Wt 42 1 kg (92 lb 14 4 oz)   BMI 19 76 kg/m²     Physical Exam  Vitals reviewed  Constitutional:       General: She is not in acute distress  Appearance: She is not ill-appearing or diaphoretic     HENT:      Head: Normocephalic and atraumatic  Nose: Nose normal  No congestion  Mouth/Throat:      Mouth: Mucous membranes are moist       Pharynx: Oropharynx is clear  Eyes:      Conjunctiva/sclera: Conjunctivae normal    Cardiovascular:      Rate and Rhythm: Normal rate and regular rhythm  Pulmonary:      Effort: Pulmonary effort is normal  No respiratory distress  Breath sounds: Normal breath sounds  Abdominal:      General: Abdomen is flat  Bowel sounds are normal  There is no distension  Palpations: Abdomen is soft  Tenderness: There is no abdominal tenderness  Musculoskeletal:         General: No signs of injury  Cervical back: Neck supple  Skin:     General: Skin is warm and dry  Findings: No bruising or erythema  Neurological:      Mental Status: She is alert  Mental status is at baseline  Psychiatric:         Mood and Affect: Mood normal          Behavior: Behavior normal          Pertinent Laboratory/Diagnostic Studies:    2/28/23  hgb 10 9    Current Medications   Medications reviewed and updated in facility chart

## 2023-03-09 NOTE — ASSESSMENT & PLAN NOTE
S/p fall at home in Feb resulting in hospitalization and subsequent admission to short term rehab  Continue PT/OT  Patient anxious to be discharged home family  Per case management family trying to make arrangements for more care at home   Will continue to follow

## 2023-03-11 ENCOUNTER — TELEPHONE (OUTPATIENT)
Dept: OTHER | Facility: OTHER | Age: 78
End: 2023-03-11

## 2023-03-11 NOTE — TELEPHONE ENCOUNTER
Olamide Fu from [facility] called wanting to review lab results  On call notified via TC

## 2023-03-11 NOTE — TELEPHONE ENCOUNTER
Danielle Munson from [facility] called in stating pt is having some discomfort in her right arm, possible cellulitis  She may need an xray  On call paged via TC

## 2023-03-11 NOTE — TELEPHONE ENCOUNTER
Cliff Schwarz called, requesting a call back from provider, to review x-ray results   Provider paged via [facility]

## 2023-03-13 ENCOUNTER — NURSING HOME VISIT (OUTPATIENT)
Dept: GERIATRICS | Facility: OTHER | Age: 78
End: 2023-03-13

## 2023-03-13 DIAGNOSIS — D64.9 ANEMIA, UNSPECIFIED TYPE: ICD-10-CM

## 2023-03-13 DIAGNOSIS — S40.021A ARM BRUISE, RIGHT, INITIAL ENCOUNTER: Primary | ICD-10-CM

## 2023-03-14 PROBLEM — S40.021A ARM BRUISE, RIGHT, INITIAL ENCOUNTER: Status: ACTIVE | Noted: 2023-03-14

## 2023-03-14 NOTE — ASSESSMENT & PLAN NOTE
· February 19, 2023: Hemoglobin/hematocrit: 9 6/30 5  · February 28, 2023: Hemoglobin/hematocrit 10/32 4  · March 11, 2023: Hemoglobin/hematocrit: 9 8/30 2  · Bruising may have contributed to recent decrease  · We will continue with clinical and periodic laboratory monitoring for change in her condition  · She will follow-up with her PCP upon discharge

## 2023-03-14 NOTE — PROGRESS NOTES
[address]  [address], [address] [place] [address]  Facility: [address] [facility] and [address] [address]  Acute visit    NAME: Milla Rivera  AGE: 65 y o  SEX: female    DATE OF ENCOUNTER: 3/13/2023    Code status:  CPR    Assessment and Plan     1  Arm bruise, right, initial encounter  Assessment & Plan:  · She is asymptomatic  · We will continue with monitoring until resolved      2  Anemia, unspecified type  Assessment & Plan:  · February 19, 2023: Hemoglobin/hematocrit: 9 6/30 5  · February 28, 2023: Hemoglobin/hematocrit 10/32 4  · March 11, 2023: Hemoglobin/hematocrit: 9 8/30 2  · Bruising may have contributed to recent decrease  · We will continue with clinical and periodic laboratory monitoring for change in her condition  · She will follow-up with her PCP upon discharge      See my history and physical note of February 25, 2023 for further information  All medications and routine orders were reviewed and updated as needed  Plan discussed with: Patient and nursing staff  CC: PCP    Chief Complaint     Right arm bruise  History of Present Illness     She is a 59-year-old woman with the comorbidities of rheumatoid arthritis, chronic pain of both shoulders with chronic bilateral chronic shoulder dislocation, restless leg syndrome, hypothyroidism, vitamin D deficiency, and anemia who is seen with nursing staff regarding right arm bruise and to review recent laboratory test results  Nursing progress notes show that bruising of her right arm was discovered over the weekend  On-call physician ordered x-ray and laboratory test results  X-ray dislocation of right shoulder arthroplasty  Laboratory remarkable for a low hemoglobin and elevated CRP level  Currently, she reports that her right shoulder range of motion is at baseline which is chronically decreased  She denies any change in her chronic right shoulder condition    She notes bruising in her right upper arm  The following portions of the patient's history were reviewed and updated as appropriate: current medications, past family history, past medical history, past social history, past surgical history and problem list     Allergies:  No Known Allergies    Review of Systems     See HPI  Medications and orders     All medications reviewed and updated in Nursing Home EMR  Objective         Physical Exam  Musculoskeletal:      Comments: There is decreased active range of motion of her right shoulder  Passive range of motion is without discomfort  Skin:     Findings: Bruising (In upper right arm) present  Pertinent Laboratory/Diagnostic Studies: The following labs and studies were reviewed please see chart or hospital paperwork for details  March 11, 2023:    CBC with differential: WBC count 6 1, hemoglobin 9 8, hematocrit 30 2, platelet count 4 4560, MCV 83    CMP: Sodium 135, potassium 4 6, BUN 22, creatinine 0 39, EGFR 102, LFTs within normal limits except albumin 3 3    CRP: 40 3 (H)    March 11, 2023:    Portable 2 view right shoulder x-ray:    Results: Reverse shoulder arthroplasty with anterior dislocation  No acute fracture  Mild soft tissue swelling  Conclusion: Dislocation of right shoulder arthroplasty      Portions of the record may have been created with voice recognition software  Occasional wrong word or "sound a like" substitutions may have occurred due to the inherent limitations of voice recognition software  Read the chart carefully and recognize, using context, where substitutions have occurred      JACEY Brooks   3/14/2023 9:56 AM

## 2023-03-16 DIAGNOSIS — G25.81 RESTLESS LEGS SYNDROME: ICD-10-CM

## 2023-03-16 RX ORDER — ROPINIROLE 2 MG/1
2 TABLET, FILM COATED ORAL
Qty: 90 TABLET | Refills: 0 | Status: SHIPPED | OUTPATIENT
Start: 2023-03-16

## 2023-04-04 ENCOUNTER — TELEPHONE (OUTPATIENT)
Dept: FAMILY MEDICINE CLINIC | Facility: CLINIC | Age: 78
End: 2023-04-04

## 2023-04-04 DIAGNOSIS — G25.81 RLS (RESTLESS LEGS SYNDROME): Primary | ICD-10-CM

## 2023-04-04 RX ORDER — METHOCARBAMOL 750 MG/1
750 TABLET, FILM COATED ORAL EVERY 6 HOURS PRN
Qty: 30 TABLET | Refills: 2 | Status: ON HOLD | OUTPATIENT
Start: 2023-04-04 | End: 2023-05-04

## 2023-04-06 ENCOUNTER — OFFICE VISIT (OUTPATIENT)
Dept: OBGYN CLINIC | Facility: OTHER | Age: 78
End: 2023-04-06

## 2023-04-06 VITALS
HEIGHT: 58 IN | DIASTOLIC BLOOD PRESSURE: 79 MMHG | BODY MASS INDEX: 20.15 KG/M2 | WEIGHT: 96 LBS | HEART RATE: 71 BPM | SYSTOLIC BLOOD PRESSURE: 145 MMHG

## 2023-04-06 DIAGNOSIS — Z96.611 INSTABILITY OF REVERSE TOTAL ARTHROPLASTY OF RIGHT SHOULDER (HCC): ICD-10-CM

## 2023-04-06 DIAGNOSIS — T84.028A INSTABILITY OF REVERSE TOTAL ARTHROPLASTY OF RIGHT SHOULDER (HCC): ICD-10-CM

## 2023-04-06 DIAGNOSIS — M25.411 EFFUSION OF RIGHT SHOULDER JOINT: Primary | ICD-10-CM

## 2023-04-06 NOTE — PROGRESS NOTES
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)    Hemarthrosis (Effusion) of right shoulder joint    Discussion and Plan:    · Unfortunately the patient has dislocated her right reverse total shoulder arthroplasty was unable to be closed reduced and the decision was made to not pursue further surgical treatment  I agree and do not recommend any surgical intervention as it has a high likelihood of failure and re dislocation due to her history on the left shoulder and multiple attempts by the ED of reduction without success due to re dislocation  She was in agreement with this treatment plan  She understood and all questions were answered  · An aspiration was performed today in the office today for symptomatic relief and this aspiration returned hemarthrosis  This is documented appropriately below  · Follow up on an as needed basis  · Member was present with the patient and they agree with the plan that no surgical intervention should be considered at this time    Subjective:   Patient ID: Manish Ferrer is a 65 y o  female    66 y/o female who presents today for a follow up visit for her right shoulder  She has a history of a reverse total shoulder arthroplasty performed on 9/2/2020  Today, patient reports that she has had multiple falls over the past month  She presented to the ED on 2/19/23 due to acute right shoulder pain  She states that they took x rays which showed a dislocated reverse total shoulder prosthesis  They tried to reduce it multiple times in the ED but it kept dislocating  She is noting continued, intermittent pain about the shoulder  Her motion is restricted  No numbness or tingling  No fevers or chills           The following portions of the patient's history were reviewed and updated as appropriate: allergies, current medications, past family history, past medical history, past social history, past surgical history and "problem list     Objective:  /79 (BP Location: Left arm, Patient Position: Sitting, Cuff Size: Adult)   Pulse 71   Ht 4' 9 5\" (1 461 m) Comment: verbal  Wt 43 5 kg (96 lb) Comment: verbal  BMI 20 41 kg/m²       Right Shoulder Exam     Range of Motion   External rotation: 10   Forward flexion: 50     Other   Erythema: absent  Sensation: normal  Pulse: present    Comments:  (+) effusion            Physical Exam  Constitutional:       Appearance: She is well-developed  Eyes:      Pupils: Pupils are equal, round, and reactive to light  Pulmonary:      Effort: Pulmonary effort is normal       Breath sounds: Normal breath sounds  Skin:     General: Skin is warm and dry  Neurological:      Mental Status: She is alert and oriented to person, place, and time  Psychiatric:         Behavior: Behavior normal          Thought Content: Thought content normal          Judgment: Judgment normal        Large joint arthrocentesis: R glenohumeral  [place] Protocol:  Consent: Verbal consent obtained  Risks and benefits: risks, benefits and alternatives were discussed  Consent given by: patient  Time out: Immediately prior to procedure a \"time out\" was called to verify the correct patient, procedure, equipment, support staff and site/side marked as required  Site marked: the operative site was marked  Radiology Images displayed and confirmed   If images not available, report reviewed: imaging studies available  Patient identity confirmed: verbally with patient    Supporting Documentation  Indications: pain and diagnostic evaluation   Procedure Details  Location: shoulder - R glenohumeral  Preparation: Patient was prepped and draped in the usual sterile fashion  Needle size: 18 G  Ultrasound guidance: no  Approach: anterior    Aspirate amount: 25 mL  Aspirate: bloody    Patient tolerance: patient tolerated the procedure well with no immediate complications  Dressing:  Sterile dressing applied        I have " personally reviewed pertinent films in PACS and my interpretation is as follows  X Ray Right Shoulder 2/19/23: Anterior dislocation of reverse total shoulder arthroplasty  Chronic AC joint separation       Scribe Attestation    I,:  Logan Adam am acting as a scribe while in the presence of the attending physician :       I,:  Tung Baltazar MD personally performed the services described in this documentation    as scribed in my presence :

## 2023-04-13 ENCOUNTER — HOSPITAL ENCOUNTER (INPATIENT)
Facility: HOSPITAL | Age: 78
LOS: 4 days | Discharge: NURSING, REHAB OR LONG-TERM CARE | End: 2023-04-17
Attending: EMERGENCY MEDICINE | Admitting: HOSPITALIST

## 2023-04-13 ENCOUNTER — APPOINTMENT (EMERGENCY)
Dept: RADIOLOGY | Facility: HOSPITAL | Age: 78
End: 2023-04-13

## 2023-04-13 DIAGNOSIS — G56.01 CARPAL TUNNEL SYNDROME ON RIGHT: ICD-10-CM

## 2023-04-13 DIAGNOSIS — F41.9 ANXIETY: ICD-10-CM

## 2023-04-13 DIAGNOSIS — L97.919 ULCERS OF BOTH LOWER LEGS (HCC): ICD-10-CM

## 2023-04-13 DIAGNOSIS — L03.90 CELLULITIS: ICD-10-CM

## 2023-04-13 DIAGNOSIS — M54.9 BACK PAIN: Primary | ICD-10-CM

## 2023-04-13 DIAGNOSIS — M54.41 ACUTE MIDLINE LOW BACK PAIN WITH RIGHT-SIDED SCIATICA: ICD-10-CM

## 2023-04-13 DIAGNOSIS — L97.929 ULCERS OF BOTH LOWER LEGS (HCC): ICD-10-CM

## 2023-04-13 PROBLEM — M54.50 ACUTE LOW BACK PAIN: Status: ACTIVE | Noted: 2023-04-13

## 2023-04-13 LAB
ALBUMIN SERPL BCP-MCNC: 2.8 G/DL (ref 3.5–5)
ALP SERPL-CCNC: 120 U/L (ref 46–116)
ALT SERPL W P-5'-P-CCNC: 23 U/L (ref 12–78)
ANION GAP SERPL CALCULATED.3IONS-SCNC: 4 MMOL/L (ref 4–13)
APTT PPP: 18 SECONDS (ref 23–37)
AST SERPL W P-5'-P-CCNC: 21 U/L (ref 5–45)
BASOPHILS # BLD AUTO: 0.01 THOUSANDS/ΜL (ref 0–0.1)
BASOPHILS NFR BLD AUTO: 0 % (ref 0–1)
BILIRUB SERPL-MCNC: 0.36 MG/DL (ref 0.2–1)
BUN SERPL-MCNC: 17 MG/DL (ref 5–25)
CALCIUM ALBUM COR SERPL-MCNC: 9.8 MG/DL (ref 8.3–10.1)
CALCIUM SERPL-MCNC: 8.8 MG/DL (ref 8.3–10.1)
CHLORIDE SERPL-SCNC: 109 MMOL/L (ref 96–108)
CO2 SERPL-SCNC: 25 MMOL/L (ref 21–32)
CREAT SERPL-MCNC: 0.52 MG/DL (ref 0.6–1.3)
EOSINOPHIL # BLD AUTO: 0.08 THOUSAND/ΜL (ref 0–0.61)
EOSINOPHIL NFR BLD AUTO: 2 % (ref 0–6)
ERYTHROCYTE [DISTWIDTH] IN BLOOD BY AUTOMATED COUNT: 17.5 % (ref 11.6–15.1)
GFR SERPL CREATININE-BSD FRML MDRD: 91 ML/MIN/1.73SQ M
GLUCOSE SERPL-MCNC: 81 MG/DL (ref 65–140)
HCT VFR BLD AUTO: 30.9 % (ref 34.8–46.1)
HGB BLD-MCNC: 9.5 G/DL (ref 11.5–15.4)
IMM GRANULOCYTES # BLD AUTO: 0.04 THOUSAND/UL (ref 0–0.2)
IMM GRANULOCYTES NFR BLD AUTO: 1 % (ref 0–2)
INR PPP: 0.84 (ref 0.84–1.19)
LACTATE SERPL-SCNC: 1.7 MMOL/L (ref 0.5–2)
LYMPHOCYTES # BLD AUTO: 0.76 THOUSANDS/ΜL (ref 0.6–4.47)
LYMPHOCYTES NFR BLD AUTO: 16 % (ref 14–44)
MCH RBC QN AUTO: 27.2 PG (ref 26.8–34.3)
MCHC RBC AUTO-ENTMCNC: 30.7 G/DL (ref 31.4–37.4)
MCV RBC AUTO: 89 FL (ref 82–98)
MONOCYTES # BLD AUTO: 0.52 THOUSAND/ΜL (ref 0.17–1.22)
MONOCYTES NFR BLD AUTO: 11 % (ref 4–12)
NEUTROPHILS # BLD AUTO: 3.36 THOUSANDS/ΜL (ref 1.85–7.62)
NEUTS SEG NFR BLD AUTO: 70 % (ref 43–75)
NRBC BLD AUTO-RTO: 0 /100 WBCS
PLATELET # BLD AUTO: 267 THOUSANDS/UL (ref 149–390)
PMV BLD AUTO: 8.4 FL (ref 8.9–12.7)
POTASSIUM SERPL-SCNC: 3.5 MMOL/L (ref 3.5–5.3)
PROCALCITONIN SERPL-MCNC: 0.12 NG/ML
PROT SERPL-MCNC: 7 G/DL (ref 6.4–8.4)
PROTHROMBIN TIME: 11.7 SECONDS (ref 11.6–14.5)
RBC # BLD AUTO: 3.49 MILLION/UL (ref 3.81–5.12)
SODIUM SERPL-SCNC: 138 MMOL/L (ref 135–147)
WBC # BLD AUTO: 4.77 THOUSAND/UL (ref 4.31–10.16)

## 2023-04-13 RX ORDER — CEFAZOLIN SODIUM 1 G/50ML
1000 SOLUTION INTRAVENOUS EVERY 8 HOURS SCHEDULED
Status: DISCONTINUED | OUTPATIENT
Start: 2023-04-13 | End: 2023-04-17

## 2023-04-13 RX ORDER — ACETAMINOPHEN 325 MG/1
650 TABLET ORAL EVERY 6 HOURS PRN
Status: DISCONTINUED | OUTPATIENT
Start: 2023-04-13 | End: 2023-04-16

## 2023-04-13 RX ORDER — HYDROXYCHLOROQUINE SULFATE 200 MG/1
100 TABLET, FILM COATED ORAL EVERY EVENING
Status: DISCONTINUED | OUTPATIENT
Start: 2023-04-13 | End: 2023-04-17 | Stop reason: HOSPADM

## 2023-04-13 RX ORDER — ENOXAPARIN SODIUM 100 MG/ML
40 INJECTION SUBCUTANEOUS DAILY
Status: DISCONTINUED | OUTPATIENT
Start: 2023-04-13 | End: 2023-04-17 | Stop reason: HOSPADM

## 2023-04-13 RX ORDER — NORTRIPTYLINE HYDROCHLORIDE 50 MG/1
100 CAPSULE ORAL
Status: DISCONTINUED | OUTPATIENT
Start: 2023-04-13 | End: 2023-04-17 | Stop reason: HOSPADM

## 2023-04-13 RX ORDER — ASCORBIC ACID 500 MG
500 TABLET ORAL DAILY
Status: DISCONTINUED | OUTPATIENT
Start: 2023-04-13 | End: 2023-04-17 | Stop reason: HOSPADM

## 2023-04-13 RX ORDER — HYDROMORPHONE HCL/PF 1 MG/ML
0.5 SYRINGE (ML) INJECTION EVERY 6 HOURS PRN
Status: DISCONTINUED | OUTPATIENT
Start: 2023-04-13 | End: 2023-04-16

## 2023-04-13 RX ORDER — METHOCARBAMOL 750 MG/1
750 TABLET, FILM COATED ORAL EVERY 6 HOURS PRN
Status: DISCONTINUED | OUTPATIENT
Start: 2023-04-13 | End: 2023-04-17 | Stop reason: HOSPADM

## 2023-04-13 RX ORDER — ACETAMINOPHEN 325 MG/1
975 TABLET ORAL ONCE
Status: COMPLETED | OUTPATIENT
Start: 2023-04-13 | End: 2023-04-13

## 2023-04-13 RX ORDER — FOLIC ACID 1 MG/1
2 TABLET ORAL DAILY
Status: DISCONTINUED | OUTPATIENT
Start: 2023-04-13 | End: 2023-04-17 | Stop reason: HOSPADM

## 2023-04-13 RX ORDER — MELATONIN
2000 DAILY
Status: DISCONTINUED | OUTPATIENT
Start: 2023-04-13 | End: 2023-04-17 | Stop reason: HOSPADM

## 2023-04-13 RX ORDER — HYDROMORPHONE HCL IN WATER/PF 6 MG/30 ML
0.2 PATIENT CONTROLLED ANALGESIA SYRINGE INTRAVENOUS ONCE
Status: COMPLETED | OUTPATIENT
Start: 2023-04-13 | End: 2023-04-13

## 2023-04-13 RX ORDER — ROPINIROLE 2 MG/1
2 TABLET, FILM COATED ORAL
Status: DISCONTINUED | OUTPATIENT
Start: 2023-04-13 | End: 2023-04-17 | Stop reason: HOSPADM

## 2023-04-13 RX ORDER — LORAZEPAM 0.5 MG/1
0.5 TABLET ORAL EVERY 8 HOURS PRN
Status: DISCONTINUED | OUTPATIENT
Start: 2023-04-13 | End: 2023-04-17 | Stop reason: HOSPADM

## 2023-04-13 RX ORDER — LEVOTHYROXINE SODIUM 0.05 MG/1
50 TABLET ORAL
Status: DISCONTINUED | OUTPATIENT
Start: 2023-04-13 | End: 2023-04-17 | Stop reason: HOSPADM

## 2023-04-13 RX ORDER — HYDROXYCHLOROQUINE SULFATE 200 MG/1
200 TABLET, FILM COATED ORAL
Status: DISCONTINUED | OUTPATIENT
Start: 2023-04-14 | End: 2023-04-17 | Stop reason: HOSPADM

## 2023-04-13 RX ORDER — LANOLIN ALCOHOL/MO/W.PET/CERES
3 CREAM (GRAM) TOPICAL
Status: DISCONTINUED | OUTPATIENT
Start: 2023-04-13 | End: 2023-04-17 | Stop reason: HOSPADM

## 2023-04-13 RX ORDER — AMOXICILLIN 250 MG
1 CAPSULE ORAL 2 TIMES DAILY
Status: DISCONTINUED | OUTPATIENT
Start: 2023-04-13 | End: 2023-04-17 | Stop reason: HOSPADM

## 2023-04-13 RX ORDER — LIDOCAINE 50 MG/G
1 PATCH TOPICAL ONCE
Status: COMPLETED | OUTPATIENT
Start: 2023-04-13 | End: 2023-04-13

## 2023-04-13 RX ORDER — KETOROLAC TROMETHAMINE 30 MG/ML
15 INJECTION, SOLUTION INTRAMUSCULAR; INTRAVENOUS ONCE
Status: COMPLETED | OUTPATIENT
Start: 2023-04-13 | End: 2023-04-13

## 2023-04-13 RX ADMIN — HYDROMORPHONE HYDROCHLORIDE 0.2 MG: 0.2 INJECTION, SOLUTION INTRAMUSCULAR; INTRAVENOUS; SUBCUTANEOUS at 07:40

## 2023-04-13 RX ADMIN — VANCOMYCIN HYDROCHLORIDE 750 MG: 750 INJECTION, SOLUTION INTRAVENOUS at 08:24

## 2023-04-13 RX ADMIN — ENOXAPARIN SODIUM 40 MG: 40 INJECTION SUBCUTANEOUS at 12:18

## 2023-04-13 RX ADMIN — CHOLECALCIFEROL TAB 25 MCG (1000 UNIT) 2000 UNITS: 25 TAB at 12:18

## 2023-04-13 RX ADMIN — KETOROLAC TROMETHAMINE 15 MG: 30 INJECTION, SOLUTION INTRAMUSCULAR; INTRAVENOUS at 06:15

## 2023-04-13 RX ADMIN — MELATONIN 3 MG: at 22:04

## 2023-04-13 RX ADMIN — HYDROMORPHONE HYDROCHLORIDE 0.5 MG: 1 INJECTION, SOLUTION INTRAMUSCULAR; INTRAVENOUS; SUBCUTANEOUS at 19:35

## 2023-04-13 RX ADMIN — SENNOSIDES AND DOCUSATE SODIUM 1 TABLET: 8.6; 5 TABLET ORAL at 15:43

## 2023-04-13 RX ADMIN — METHOCARBAMOL 750 MG: 750 TABLET ORAL at 18:00

## 2023-04-13 RX ADMIN — LIDOCAINE 5% 1 PATCH: 700 PATCH TOPICAL at 06:15

## 2023-04-13 RX ADMIN — CEFAZOLIN SODIUM 1000 MG: 1 SOLUTION INTRAVENOUS at 20:01

## 2023-04-13 RX ADMIN — FOLIC ACID 2 MG: 1 TABLET ORAL at 12:18

## 2023-04-13 RX ADMIN — METHOCARBAMOL 750 MG: 750 TABLET ORAL at 12:26

## 2023-04-13 RX ADMIN — ROPINIROLE 2 MG: 2 TABLET, FILM COATED ORAL at 22:04

## 2023-04-13 RX ADMIN — LEVOTHYROXINE SODIUM 50 MCG: 50 TABLET ORAL at 12:20

## 2023-04-13 RX ADMIN — HYDROXYCHLOROQUINE SULFATE 100 MG: 200 TABLET ORAL at 17:59

## 2023-04-13 RX ADMIN — NORTRIPTYLINE HYDROCHLORIDE 100 MG: 50 CAPSULE ORAL at 22:04

## 2023-04-13 RX ADMIN — ACETAMINOPHEN 975 MG: 325 TABLET ORAL at 06:15

## 2023-04-13 RX ADMIN — CEFTRIAXONE SODIUM 1000 MG: 10 INJECTION, POWDER, FOR SOLUTION INTRAVENOUS at 07:37

## 2023-04-13 RX ADMIN — OXYCODONE HYDROCHLORIDE AND ACETAMINOPHEN 500 MG: 500 TABLET ORAL at 12:18

## 2023-04-13 RX ADMIN — LORAZEPAM 0.5 MG: 0.5 TABLET ORAL at 12:11

## 2023-04-13 NOTE — H&P
[facility]  H&P  Name: Selam Floor 65 y o  female I MRN: [id number]  Unit/Bed#: ED 6 I Date of Admission: 4/13/2023   Date of Service: 4/13/2023 I Hospital Day: 0      Assessment/Plan   Other forms of systemic lupus erythematosus (HCC)  Assessment & Plan  -Continue Plaquenil  -hold MTX with cellulitis    Cellulitis of left lower extremity  Assessment & Plan  -L>R  -In the setting of likely chronic venous stasis dermatitis (patient reports chronic B/L lower extremity erythema)  -cont Vanco started in ER  -wound care    Rheumatoid arthritis involving multiple sites with positive rheumatoid factor (HCC)  Assessment & Plan  -Continue Plaquenil  -hold MTX with cellulitis    Hypothyroidism due to Hashimoto's thyroiditis  Assessment & Plan  -cont Levoxyl    * Acute low back pain  Assessment & Plan  -No bowel or bladder incontinence  -With associated radiculopathy of the right lower extremity with positive straight leg raise test  -CT A/P showed superior endplate depression of the L5 vertebra and wedge compression deformity, age-indeterminate likely chronic, chronic compression fracture of the L3 vertebra with retropulsion posterior vertebral margin and moderate canal narrowing   -Start Neurontin  -Continue Tylenol as needed, Dilaudid as needed  -Can consider further dosing with Toradol but would rather order 1 dose at a time with risk/benefit analysis of NSAID dosing on each occasion (received Toradol in ER)  -PT/OT        VTE Pharmacologic Prophylaxis:   Lovenox  Code Status: FULL    Anticipated Length of Stay: Patient will be admitted on an inpatient basis with an anticipated length of stay of greater than 2 midnights secondary to cellulitis      Total Time Spent on Date of Encounter in care of patient: 75 minutes This time was spent on one or more of the following: performing physical exam; counseling and coordination of care; obtaining or reviewing history; documenting in the medical record; reviewing/ordering tests, medications or procedures; communicating with other healthcare professionals and discussing with patient's family/caregivers  Chief Complaint: Back pain    History of Present Illness:  Joanne Lorenz is a 65 y o  female who presents with a chief complaint of low back pain  Patient reports she has chronic back pain but has been worse over the last 5 days  It originates in her low back and radiates down her right leg  It is worse with walking with her walker and has limited her ability to use her walker to walk  She denies any bowel or bladder incontinence  Patient denies other acute complaints including chest pain, shortness of breath, palpitations, nausea, vomiting, diarrhea, headache, visual disturbances, neck stiffness, strokelike symptoms  Patient notes that she has bilateral lower extremity erythema that is chronic  Review of Systems:  Review of Systems   All other systems reviewed and are negative  Past Medical and Surgical History:   Past Medical History:   Diagnosis Date   • Acute blood loss anemia 9/9/2020   • Aftercare following joint replacement 9/9/2020    Patient had right reversed total shoulder arthroplasty   Pain was controlled when he left the hospital      • Anxiety    • Arthritis    • Depression    • Disease of thyroid gland     HYPO   • Femur neck fracture (Formerly [facility])    • GERD (gastroesophageal reflux disease)    • Hyperthyroidism     RESOLVED: [id number]   • Osteoporosis    • Polio    • PVD (peripheral vascular disease) (Formerly [facility])    • Right BBB/left ant fasc block    • UTI (urinary tract infection)    • Varicella infection     LAST ASSESSED: [id number]       Past Surgical History:   Procedure Laterality Date   • APPENDECTOMY     • HIP ARTHROPLASTY Left 11/27/2017    Procedure: REMOVAL IM NAIL CONVERSION TO TOTAL HIP ARTHROPLASTY POSTERIOR APPROACH;  Surgeon: Maryjo Briones MD;  Location: [facility];  Service: Orthopedics   • HIP FRACTURE SURGERY     • HIP SURGERY      both hips replaced;2013 left ,2014 right   • JOINT REPLACEMENT Right     HIP TOTAL   • CO ARTHROPLASTY GLENOHUMERAL JOINT TOTAL SHOULDER Right 9/2/2020    Procedure: ARTHROPLASTY SHOULDER REVERSE;  Surgeon: Jacquie Ott MD;  Location: [facility];  Service: Orthopedics   • CO ARTHROPLASTY GLENOHUMERAL JOINT TOTAL SHOULDER Left 6/1/2021    Procedure: ARTHROPLASTY SHOULDER REVERSE;  Surgeon: Jacquie Ott MD;  Location[facility];  Service: Orthopedics   • CO CONV PREV HIP TOT HIP ARTHRP W/WO AGRFT/ALGRFT Left 10/4/2017    Procedure: Diandra Spanfadumo removal of left hip;  Surgeon: Ricardo Carver MD;  Location: [facility];  Service: Orthopedics   • CO [address]oors WRST SURG W/RLS TRANSVRS CARPL LIGM Right 5/24/2022    Procedure: RELEASE CARPAL TUNNEL ENDOSCOPIC-right;  Surgeon: Guille Elmore MD;  Location: [facility];  Service: Orthopedics   • REVISION TOTAL HIP ARTHROPLASTY Right    • TOE AMPUTATION Left 7/24/2022    Procedure: 5TH METATARSAL RESECTION WITH BOTTOM FLAP CLOSURE;  Surgeon: Lucy Whalen DPM;  Location: [facility];  Service: Podiatry   • TONSILLECTOMY         Meds/Allergies:  Prior to Admission medications    Medication Sig Start Date End Date Taking?  Authorizing Provider   acetaminophen (TYLENOL) 650 mg CR tablet Take 1 tablet (650 mg total) by mouth every 8 (eight) hours as needed for mild pain 5/24/22   Guille Elmore MD   Calcium Ascorbate (VITAMIN C) 500 mg tablet Take 500 mg by mouth daily    Historical Provider, MD   Cholecalciferol 50 MCG (2000 UT) TBDP Take 50 mcg by mouth daily    Historical Provider, MD   folic acid (FOLVITE) 1 mg tablet Take 2 mg by mouth daily 2/24/22   Historical Provider, MD   hydroxychloroquine (PLAQUENIL) 200 mg tablet 1 pill am 1/2 pill pm 4/4/20   Historical Provider, MD   ibuprofen (MOTRIN) 400 mg tablet Take 400 mg by mouth every 6 (six) hours as needed    Historical Provider, MD   levothyroxine 50 mcg tablet Take 1 tablet (50 mcg total) by mouth daily in the early morning 12/7/22 1/6/23  Christopher Gutiérrez MD   melatonin 3 mg Take 3 mg by mouth daily at bedtime    Historical Provider, MD   methocarbamol (ROBAXIN) 750 mg tablet Take 1 tablet (750 mg total) by mouth every 6 (six) hours as needed for muscle spasms 4/4/23   Christopher Gutiérrez MD   methotrexate 2 5 MG tablet 3 tablets by mouth once a week on Thursday 9/1/20   Berry Backbone, PA-C   Multiple Vitamins-Minerals (CENTRUM SILVER PO) Take 1 tablet by mouth daily 12/13/16   Historical Provider, MD   nortriptyline (PAMELOR) 50 mg capsule Take 2 capsules (100 mg total) by mouth daily at bedtime 11/21/22 2/19/23  Christopher Gutiérrez MD   rOPINIRole (REQUIP) 2 mg tablet Take 1 tablet (2 mg total) by mouth daily at bedtime 3/16/23   Christopher Gutiérrez, MD   senna-docusate sodium (SENOKOT S) 8 6-50 mg per tablet Take 1 tablet by mouth 2 (two) times a day 8/3/22   Vern Vargas DPM     I have reviewed home medications with a medical source (PCP, Pharmacy, other)      Allergies: No Known Allergies    Social History:  Marital Status:    Patient Pre-hospital Level of Mobility: walks with walker  Substance Use History:   Social History     Substance and Sexual Activity   Alcohol Use Not Currently     Social History     Tobacco Use   Smoking Status Former   Smokeless Tobacco Never   Tobacco Comments    quit 20-30 years ago     Social History     Substance and Sexual Activity   Drug Use Not Currently       Family History:  Family History   Problem Relation Age of Onset   • Rheum arthritis Mother    • Rheum arthritis Sister    • Prostate cancer Brother        Physical Exam:     Vitals:   Blood Pressure: 109/72 (04/13/23 0555)  Pulse: 72 (04/13/23 0555)  Temperature: 97 7 °F (36 5 °C) (04/13/23 0555)  Temp Source: Oral (04/13/23 0555)  Respirations: 18 (04/13/23 0555)  SpO2: 95 % (04/13/23 0555)    Physical Exam   Gen: NAD, AAOx3, appears chronically ill malnourished  Eyes: EOMI, PERRLA, no scleral icterus  ENMT:  no nasal discharge, no otic discharge, moist mucous membranes  Neck:  Supple  Cardiovascular:  Regular rate and rhythm, normal S1-S2, no murmurs, rubs, or gallops  Lungs:  Clear to auscultation bilaterally, no wheezes, or rales, or rhonchi  Abdomen:  Positive bowel sounds, soft, nontender, nondistended, no palpable organomegaly   Skin: Trace bilateral lower extremity edema, left greater than right lower extremity cellulitis  Left leg with 1 shallow/superficial ulcer without discharge  Another small, approximately 1 cm area of purpura  Neuro: Cranial nerves 2-12 are intact, non-focal, 4/5 strength in B/L LEs, RLE POS straight leg raise test      Additional Data:     Lab Results:  Results from last 7 days   Lab Units 04/13/23  0819   WBC Thousand/uL 4 77   HEMOGLOBIN g/dL 9 5*   HEMATOCRIT % 30 9*   PLATELETS Thousands/uL 267   NEUTROS PCT % 70   LYMPHS PCT % 16   MONOS PCT % 11   EOS PCT % 2     Results from last 7 days   Lab Units 04/13/23  0640   SODIUM mmol/L 138   POTASSIUM mmol/L 3 5   CHLORIDE mmol/L 109*   CO2 mmol/L 25   BUN mg/dL 17   CREATININE mg/dL 0 52*   ANION GAP mmol/L 4   CALCIUM mg/dL 8 8   ALBUMIN g/dL 2 8*   TOTAL BILIRUBIN mg/dL 0 36   ALK PHOS U/L 120*   ALT U/L 23   AST U/L 21   GLUCOSE RANDOM mg/dL 81     Results from last 7 days   Lab Units 04/13/23  0640   INR  0 84             Results from last 7 days   Lab Units 04/13/23  0640   LACTIC ACID mmol/L 1 7   PROCALCITONIN ng/ml 0 12       Lines/Drains:  Invasive Devices     Peripheral Intravenous Line  Duration           Peripheral IV 04/13/23 Distal;Dorsal (posterior); Left Forearm <1 day          Drain  Duration           External Urinary Catheter 52 days                    Imaging: Reviewed radiology reports from this admission including: abdominal/pelvic CT  CT abdomen pelvis wo contrast   Final Result by Ashleigh Ralph MD (04/13 1421)      Larger right renal pelvis calculus measuring about 3 cm with mild prominence of the right pelvicalyceal system      Additional nonobstructing calculi in the upper and lower pole of the right kidney measuring about 3 mm      Moderate amount of fecal debris in the colon      Superior endplate depression of the L5 vertebra and wedge compression deformity, age-indeterminate likely chronic      Chronic compression fracture of the L3 vertebra with retropulsion posterior vertebral margin and moderate canal narrowing      Nodular contour of the liver, raises concern for cirrhosis   Osteoporosis         Workstation performed: [id number]             ** Please Note: This note has been constructed using a voice recognition system   **

## 2023-04-13 NOTE — ASSESSMENT & PLAN NOTE
-L>R  -In the setting of likely chronic venous stasis dermatitis (patient reports chronic B/L lower extremity erythema)  -cont Vanco started in ER  -wound care

## 2023-04-13 NOTE — ASSESSMENT & PLAN NOTE
-No bowel or bladder incontinence  -With associated radiculopathy of the right lower extremity with positive straight leg raise test  -CT A/P showed superior endplate depression of the L5 vertebra and wedge compression deformity, age-indeterminate likely chronic, chronic compression fracture of the L3 vertebra with retropulsion posterior vertebral margin and moderate canal narrowing   -Start Neurontin  -Continue Tylenol as needed, Dilaudid as needed  -Can consider further dosing with Toradol but would rather order 1 dose at a time with risk/benefit analysis of NSAID dosing on each occasion (received Toradol in ER)  -PT/OT

## 2023-04-13 NOTE — ED PROVIDER NOTES
History  Chief Complaint   Patient presents with   • Back Pain     Pt arrives via EMS c/o 10/10 lower back pain that radiates down the R leg  Pt denies loss of bladder or bowels  76-year-old female with a past medical history of anxiety, depression, thyroid disease, and GERD presents with back pain  Patient states that the pain started about a week ago  She does not report any recent injuries or heavy lifting  She states that the pain has been worsening throughout the week  Today, she could not get up out of bed, so she called EMS to bring her in  She states the pain is in the bilateral low back  It radiates down her right leg all the way to the foot  She states that it is a sharp shooting pain radiating down the back of her leg  The pain in her back is a crampy pain  Patient has no weakness in her extremities  No saddle anesthesia or incontinence  No recent fevers  No history of IV drug use, cancer, steroid use, or blood thinners  Patient states that she has been taking Tylenol for pain and it has not been helping  Patient states that she normally ambulates with a walker, but could not get out of bed this morning  She has not taken anything this morning  Patient also has warmth and erythema on her bilateral lower extremities  Patient states that she only noticed this this morning  It is not painful or pruritic  Prior to Admission Medications   Prescriptions Last Dose Informant Patient Reported? Taking?    Calcium Ascorbate (VITAMIN C) 500 mg tablet 4/13/2023  Yes Yes   Sig: Take 500 mg by mouth daily   Cholecalciferol 50 MCG (2000 UT) TBDP Unknown  Yes No   Sig: Take 50 mcg by mouth daily   Multiple Vitamins-Minerals (CENTRUM SILVER PO) Unknown  Yes No   Sig: Take 1 tablet by mouth daily   acetaminophen (TYLENOL) 650 mg CR tablet 4/13/2023  No Yes   Sig: Take 1 tablet (650 mg total) by mouth every 8 (eight) hours as needed for mild pain   folic acid (FOLVITE) 1 mg tablet Unknown  Yes No   Sig: Take 2 mg by mouth daily   hydroxychloroquine (PLAQUENIL) 200 mg tablet Unknown  Yes No   Si pill am 1/2 pill pm   ibuprofen (MOTRIN) 400 mg tablet 2023  Yes Yes   Sig: Take 400 mg by mouth every 6 (six) hours as needed   levothyroxine 50 mcg tablet   No No   Sig: Take 1 tablet (50 mcg total) by mouth daily in the early morning   melatonin 3 mg 2023  Yes Yes   Sig: Take 3 mg by mouth daily at bedtime   methocarbamol (ROBAXIN) 750 mg tablet 2023  No Yes   Sig: Take 1 tablet (750 mg total) by mouth every 6 (six) hours as needed for muscle spasms   methotrexate 2 5 MG tablet Past Week  No Yes   Sig: 3 tablets by mouth once a week on Thursday   nortriptyline (PAMELOR) 50 mg capsule   No No   Sig: Take 2 capsules (100 mg total) by mouth daily at bedtime   rOPINIRole (REQUIP) 2 mg tablet 2023  No Yes   Sig: Take 1 tablet (2 mg total) by mouth daily at bedtime   senna-docusate sodium (SENOKOT S) 8 6-50 mg per tablet Unknown  No No   Sig: Take 1 tablet by mouth 2 (two) times a day      Facility-Administered Medications: None       Past Medical History:   Diagnosis Date   • Acute blood loss anemia 2020   • Aftercare following joint replacement 2020    Patient had right reversed total shoulder arthroplasty   Pain was controlled when he left the hospital      • Anxiety    • Arthritis    • Depression    • Disease of thyroid gland     HYPO   • Femur neck fracture (HCC)    • GERD (gastroesophageal reflux disease)    • Hyperthyroidism     RESOLVED: [id number]   • Osteoporosis    • Polio    • PVD (peripheral vascular disease) ([facility])    • Right BBB/left ant fasc block    • UTI (urinary tract infection)    • Varicella infection     LAST ASSESSED: [id number]       Past Surgical History:   Procedure Laterality Date   • APPENDECTOMY     • HIP ARTHROPLASTY Left 2017    Procedure: REMOVAL IM NAIL CONVERSION TO TOTAL HIP ARTHROPLASTY POSTERIOR APPROACH;  Surgeon: Garfield Perez MD;  Location:  MAIN OR;  Service: Orthopedics   • HIP FRACTURE SURGERY     • HIP SURGERY      both hips replaced;2013 left ,2014 right   • JOINT REPLACEMENT Right     HIP TOTAL   • MS ARTHROPLASTY GLENOHUMERAL JOINT TOTAL SHOULDER Right 9/2/2020    Procedure: ARTHROPLASTY SHOULDER REVERSE;  Surgeon: Linda Lock MD;  Location: [facility];  Service: Orthopedics   • MS ARTHROPLASTY GLENOHUMERAL JOINT TOTAL SHOULDER Left 6/1/2021    Procedure: ARTHROPLASTY SHOULDER REVERSE;  Surgeon: Linda Lock MD;  Location: [facility];  Service: Orthopedics   • MS CONV PREV HIP TOT HIP ARTHRP W/WO AGRFT/ALGRFT Left 10/4/2017    Procedure: [place] removal of left hip;  Surgeon: Tara Luciano MD;  Location: [facility];  Service: Orthopedics   • MS [facility] WRST SURG W/RLS TRANSVRS CARPL LIGM Right 5/24/2022    Procedure: RELEASE CARPAL TUNNEL ENDOSCOPIC-right;  Surgeon: Leonid Hernandez MD;  Location: [facility];  Service: Orthopedics   • REVISION TOTAL HIP ARTHROPLASTY Right    • TOE AMPUTATION Left 7/24/2022    Procedure: 5TH METATARSAL RESECTION WITH BOTTOM FLAP CLOSURE;  Surgeon: Michelle Bañuelos DPM;  Location: [facility];  Service: Podiatry   • TONSILLECTOMY         Family History   Problem Relation Age of Onset   • Rheum arthritis Mother    • Rheum arthritis Sister    • Prostate cancer Brother      I have reviewed and agree with the history as documented  E-Cigarette/Vaping   • E-Cigarette Use Never User      E-Cigarette/Vaping Substances   • Nicotine No    • THC No    • CBD No    • Flavoring No    • Other No    • Unknown No      Social History     Tobacco Use   • Smoking status: Former   • Smokeless tobacco: Never   • Tobacco comments:     quit 20-30 years ago   Vaping Use   • Vaping Use: Never used   Substance Use Topics   • Alcohol use: Not Currently   • Drug use: Not Currently        Review of Systems   Constitutional: Negative for chills, fatigue and fever  HENT: Negative for congestion, rhinorrhea and sore throat  Eyes: Negative for pain and redness  Respiratory: Negative for cough, chest tightness, shortness of breath and wheezing  Cardiovascular: Negative for chest pain and palpitations  Gastrointestinal: Negative for abdominal pain, diarrhea, nausea and vomiting  Endocrine: Negative  Genitourinary: Negative for difficulty urinating and hematuria  Musculoskeletal: Positive for back pain and gait problem  Negative for myalgias  Skin: Negative for color change and pallor  Allergic/Immunologic: Negative  Neurological: Negative for dizziness, weakness, light-headedness and headaches  Hematological: Negative  Physical Exam  ED Triage Vitals [04/13/23 0555]   Temperature Pulse Respirations Blood Pressure SpO2   97 7 °F (36 5 °C) 72 18 109/72 95 %      Temp Source Heart Rate Source Patient Position - Orthostatic VS BP Location FiO2 (%)   Oral Monitor Lying Right arm --      Pain Score       10 - Worst Possible Pain             Orthostatic Vital Signs  Vitals:    04/13/23 0555 04/13/23 1000 04/13/23 1549 04/13/23 2238   BP: 109/72 170/77 169/84 134/65   Pulse: 72 77 86 84   Patient Position - Orthostatic VS: Lying Lying         Physical Exam  Vitals and nursing note reviewed  Constitutional:       General: She is not in acute distress  Appearance: Normal appearance  She is not ill-appearing  HENT:      Head: Normocephalic and atraumatic  Eyes:      Conjunctiva/sclera: Conjunctivae normal    Cardiovascular:      Rate and Rhythm: Normal rate and regular rhythm  Pulses:           Dorsalis pedis pulses are 2+ on the right side and 2+ on the left side  Pulmonary:      Effort: Pulmonary effort is normal  No respiratory distress  Abdominal:      General: Abdomen is flat  There is no distension  Palpations: Abdomen is soft  Musculoskeletal:         General: Normal range of motion  Cervical back: Normal range of motion and neck supple        Comments: Patient has tenderness along the lumbar spine and the lumbar paraspinal muscles  Decreased range of motion secondary to pain  Patient has difficulty lifting both of her legs off the bed, but she notes that this is baseline  Distal strength intact  Skin:     General: Skin is warm and dry  Comments: Patient has bilateral pitting edema with overlying erythema and warmth  There is some blistering and open wounds  Neurological:      General: No focal deficit present  Mental Status: She is alert and oriented to person, place, and time           ED Medications  Medications   acetaminophen (TYLENOL) tablet 650 mg (has no administration in time range)   ascorbic acid (VITAMIN C) tablet 500 mg (500 mg Oral Given 4/13/23 1218)   cholecalciferol (VITAMIN D3) tablet 2,000 Units (2,000 Units Oral Given 6/94/68 2227)   folic acid (FOLVITE) tablet 2 mg (2 mg Oral Given 4/13/23 1218)   levothyroxine tablet 50 mcg (50 mcg Oral Given 4/13/23 1220)   hydroxychloroquine (PLAQUENIL) tablet 200 mg (has no administration in time range)   hydroxychloroquine (PLAQUENIL) tablet 100 mg (100 mg Oral Given 4/13/23 1759)   melatonin tablet 3 mg (3 mg Oral Given 4/13/23 2204)   methocarbamol (ROBAXIN) tablet 750 mg (750 mg Oral Given 4/13/23 1800)   nortriptyline (PAMELOR) capsule 100 mg (100 mg Oral Given 4/13/23 2204)   rOPINIRole (REQUIP) tablet 2 mg (2 mg Oral Given 4/13/23 2204)   senna-docusate sodium (SENOKOT S) 8 6-50 mg per tablet 1 tablet (1 tablet Oral Not Given 4/13/23 1934)   enoxaparin (LOVENOX) subcutaneous injection 40 mg (40 mg Subcutaneous Given 4/13/23 1218)   HYDROmorphone (DILAUDID) injection 0 5 mg (0 5 mg Intravenous Given 4/13/23 1935)   LORazepam (ATIVAN) tablet 0 5 mg (0 5 mg Oral Given 4/13/23 1211)   ceFAZolin (ANCEF) IVPB (premix in dextrose) 1,000 mg 50 mL (1,000 mg Intravenous New Bag 4/13/23 2001)   acetaminophen (TYLENOL) tablet 975 mg (975 mg Oral Given 4/13/23 0615)   ketorolac (TORADOL) injection 15 mg (15 mg Intramuscular Given 4/13/23 0615)   lidocaine (LIDODERM) 5 % patch 1 patch (1 patch Topical Patch Removed 4/13/23 1800)   vancomycin (VANCOCIN) IVPB (premix in dextrose) 750 mg 150 mL (0 mg Intravenous Stopped 4/13/23 0930)   cefTRIAXone (ROCEPHIN) 1,000 mg in dextrose 5 % 50 mL IVPB (0 mg Intravenous Stopped 4/13/23 0810)   HYDROmorphone HCl (DILAUDID) injection 0 2 mg (0 2 mg Intravenous Given 4/13/23 0740)       Diagnostic Studies  Results Reviewed     Procedure Component Value Units Date/Time    Blood culture #1 [[id number]] Collected: 04/13/23 0640    Lab Status: Preliminary result Specimen: Blood from Arm, Left Updated: 04/13/23 1101     Blood Culture Received in Microbiology Lab  Culture in Progress  Blood culture #2 [[id number]] Collected: 04/13/23 0640    Lab Status: Preliminary result Specimen: Blood from Arm, Right Updated: 04/13/23 1101     Blood Culture Received in Microbiology Lab  Culture in Progress      CBC and differential [[id number]]  (Abnormal) Collected: 04/13/23 0819    Lab Status: Final result Specimen: Blood from Arm, Left Updated: 04/13/23 0827     WBC 4 77 Thousand/uL      RBC 3 49 Million/uL      Hemoglobin 9 5 g/dL      Hematocrit 30 9 %      MCV 89 fL      MCH 27 2 pg      MCHC 30 7 g/dL      RDW 17 5 %      MPV 8 4 fL      Platelets 705 Thousands/uL      nRBC 0 /100 WBCs      Neutrophils Relative 70 %      Immat GRANS % 1 %      Lymphocytes Relative 16 %      Monocytes Relative 11 %      Eosinophils Relative 2 %      Basophils Relative 0 %      Neutrophils Absolute 3 36 Thousands/µL      Immature Grans Absolute 0 04 Thousand/uL      Lymphocytes Absolute 0 76 Thousands/µL      Monocytes Absolute 0 52 Thousand/µL      Eosinophils Absolute 0 08 Thousand/µL      Basophils Absolute 0 01 Thousands/µL     Procalcitonin [[id number]]  (Normal) Collected: 04/13/23 0640    Lab Status: Final result Specimen: Blood from Arm, Left Updated: 04/13/23 0749     Procalcitonin 0 12 ng/ml     Protime-INR [[id number]] (Normal) Collected: 04/13/23 0640    Lab Status: Final result Specimen: Blood from Arm, Right Updated: 04/13/23 0723     Protime 11 7 seconds      INR 0 84    APTT [[id number]]  (Abnormal) Collected: 04/13/23 0640    Lab Status: Final result Specimen: Blood from Arm, Right Updated: 04/13/23 0723     PTT 18 seconds     Lactic acid, plasma (w/reflex if result > 2 0) [[id number]]  (Normal) Collected: 04/13/23 0640    Lab Status: Final result Specimen: Blood from Arm, Left Updated: 04/13/23 0718     LACTIC ACID 1 7 mmol/L     Narrative:      Result may be elevated if tourniquet was used during collection      Comprehensive metabolic panel [[id number]]  (Abnormal) Collected: 04/13/23 0640    Lab Status: Final result Specimen: Blood from Arm, Left Updated: 04/13/23 0713     Sodium 138 mmol/L      Potassium 3 5 mmol/L      Chloride 109 mmol/L      CO2 25 mmol/L      ANION GAP 4 mmol/L      BUN 17 mg/dL      Creatinine 0 52 mg/dL      Glucose 81 mg/dL      Calcium 8 8 mg/dL      Corrected Calcium 9 8 mg/dL      AST 21 U/L      ALT 23 U/L      Alkaline Phosphatase 120 U/L      Total Protein 7 0 g/dL      Albumin 2 8 g/dL      Total Bilirubin 0 36 mg/dL      eGFR 91 ml/min/1 73sq m     Narrative:      [place] guidelines for Chronic Kidney Disease (CKD):   •  Stage 1 with normal or high GFR (GFR > 90 mL/min/1 73 square meters)  •  Stage 2 Mild CKD (GFR = 60-89 mL/min/1 73 square meters)  •  Stage 3A Moderate CKD (GFR = 45-59 mL/min/1 73 square meters)  •  Stage 3B Moderate CKD (GFR = 30-44 mL/min/1 73 square meters)  •  Stage 4 Severe CKD (GFR = 15-29 mL/min/1 73 square meters)  •  Stage 5 End Stage CKD (GFR <15 mL/min/1 73 square meters)  Note: GFR calculation is accurate only with a steady state creatinine                 CT abdomen pelvis wo contrast   Final Result by Italo Duran MD (04/13 8978)      Larger right renal pelvis calculus measuring about 3 cm with mild prominence of the right pelvicalyceal system      Additional nonobstructing calculi in the upper and lower pole of the right kidney measuring about 3 mm      Moderate amount of fecal debris in the colon      Superior endplate depression of the L5 vertebra and wedge compression deformity, age-indeterminate likely chronic      Chronic compression fracture of the L3 vertebra with retropulsion posterior vertebral margin and moderate canal narrowing      Nodular contour of the liver, raises concern for cirrhosis   Osteoporosis         Workstation performed: [id number]               Procedures  Procedures      ED Course                                       Medical Decision Making  67-year-old female presents with back pain and rash on the legs  Legs are concerning for cellulitis  We will do a septic work-up to further evaluate  We will start patient on antibiotics  Will evaluate back pain with a CT of the lumbar spine to look for traumatic injuries  No concerning features on history or exam that indicates that patient has a spinal infection or progressive neurologic process that would require MRI for diagnosis  We will give Tylenol, Toradol, and Lidoderm patch for pain  Patient felt better after medications  She was signed out to Dr Varinder Ramos pending CT scan and labs  Patient was admitted to [facility] for cellulitis of the bilateral lower extremities and back pain  Back pain: acute illness or injury  Cellulitis: acute illness or injury  Ulcers of both lower legs ([facility] [address] ): acute illness or injury  Amount and/or Complexity of Data Reviewed  Independent Historian: EMS  External Data Reviewed:      Details: Reviewed past medical history and medications  Labs: ordered  Radiology: ordered  Risk  OTC drugs  Prescription drug management  Decision regarding hospitalization              Disposition  Final diagnoses:   Back pain   Ulcers of both lower legs ([facility] [address] )   Cellulitis     Time reflects when diagnosis was documented in both MDM as applicable and the Disposition within this note     Time User Action Codes Description Comment    4/13/2023  8:45 AM Leandro Garcia Add [M54 9] Back pain     4/13/2023  8:46 AM Leandro Garcia Add [I90 193,  L97 929] Ulcers of both lower legs ([address] )     4/13/2023  8:46 AM Leandro Garcia Add [L03 90] Cellulitis       ED Disposition     ED Disposition   Admit    Condition   Stable    Date/Time   Thu Apr 13, 2023  9:22 AM    Comment   Case was discussed with Dr Allen Kate and the patient's admission status was agreed to be Admission Status: inpatient status to the service of Dr Allen Kate              Follow-up Information    None         Current Discharge Medication List      CONTINUE these medications which have NOT CHANGED    Details   acetaminophen (TYLENOL) 650 mg CR tablet Take 1 tablet (650 mg total) by mouth every 8 (eight) hours as needed for mild pain  Qty: 30 tablet, Refills: 0    Associated Diagnoses: Carpal tunnel syndrome on right      Calcium Ascorbate (VITAMIN C) 500 mg tablet Take 500 mg by mouth daily      ibuprofen (MOTRIN) 400 mg tablet Take 400 mg by mouth every 6 (six) hours as needed      melatonin 3 mg Take 3 mg by mouth daily at bedtime      methocarbamol (ROBAXIN) 750 mg tablet Take 1 tablet (750 mg total) by mouth every 6 (six) hours as needed for muscle spasms  Qty: 30 tablet, Refills: 2    Associated Diagnoses: RLS (restless legs syndrome)      methotrexate 2 5 MG tablet 3 tablets by mouth once a week on Thursday  Qty:      Associated Diagnoses: Rheumatoid arthritis involving multiple sites with positive rheumatoid factor (HCC)      rOPINIRole (REQUIP) 2 mg tablet Take 1 tablet (2 mg total) by mouth daily at bedtime  Qty: 90 tablet, Refills: 0    Associated Diagnoses: Restless legs syndrome      Cholecalciferol 50 MCG (2000 UT) TBDP Take 50 mcg by mouth daily      folic acid (FOLVITE) 1 mg tablet Take 2 mg by mouth daily      hydroxychloroquine (PLAQUENIL) 200 mg tablet 1 pill am 1/2 pill pm      levothyroxine 50 mcg tablet Take 1 tablet (50 mcg total) by mouth daily in the early morning  Qty: 90 tablet, Refills: 1    Associated Diagnoses: Hypothyroidism due to Hashimoto's thyroiditis; Chronic pain syndrome      Multiple Vitamins-Minerals (CENTRUM SILVER PO) Take 1 tablet by mouth daily      nortriptyline (PAMELOR) 50 mg capsule Take 2 capsules (100 mg total) by mouth daily at bedtime  Qty: 180 capsule, Refills: 1    Associated Diagnoses: Other insomnia      senna-docusate sodium (SENOKOT S) 8 6-50 mg per tablet Take 1 tablet by mouth 2 (two) times a day  Qty: 20 tablet, Refills: 0    Associated Diagnoses: Other osteomyelitis of left foot ([facility] [address] )           No discharge procedures on file  PDMP Review       Value Time User    PDMP Reviewed  Yes 2/22/2023 10:11 AM Donna Bernstein PA-C           ED Provider  Attending physically available and evaluated Dyann Shone Colon I managed the patient along with the ED Attending      Electronically Signed by         Roberth Webb DO  04/14/23 8435

## 2023-04-14 RX ORDER — GABAPENTIN 100 MG/1
100 CAPSULE ORAL 3 TIMES DAILY
Status: DISCONTINUED | OUTPATIENT
Start: 2023-04-14 | End: 2023-04-17 | Stop reason: HOSPADM

## 2023-04-14 RX ADMIN — MELATONIN 3 MG: at 22:00

## 2023-04-14 RX ADMIN — CEFAZOLIN SODIUM 1000 MG: 1 SOLUTION INTRAVENOUS at 10:30

## 2023-04-14 RX ADMIN — ACETAMINOPHEN 650 MG: 325 TABLET ORAL at 06:32

## 2023-04-14 RX ADMIN — CEFAZOLIN SODIUM 1000 MG: 1 SOLUTION INTRAVENOUS at 03:53

## 2023-04-14 RX ADMIN — HYDROMORPHONE HYDROCHLORIDE 0.5 MG: 1 INJECTION, SOLUTION INTRAMUSCULAR; INTRAVENOUS; SUBCUTANEOUS at 15:12

## 2023-04-14 RX ADMIN — HYDROMORPHONE HYDROCHLORIDE 0.5 MG: 1 INJECTION, SOLUTION INTRAMUSCULAR; INTRAVENOUS; SUBCUTANEOUS at 07:43

## 2023-04-14 RX ADMIN — ENOXAPARIN SODIUM 40 MG: 40 INJECTION SUBCUTANEOUS at 10:31

## 2023-04-14 RX ADMIN — OXYCODONE HYDROCHLORIDE AND ACETAMINOPHEN 500 MG: 500 TABLET ORAL at 10:30

## 2023-04-14 RX ADMIN — METHOCARBAMOL 750 MG: 750 TABLET ORAL at 22:00

## 2023-04-14 RX ADMIN — GABAPENTIN 100 MG: 100 CAPSULE ORAL at 10:30

## 2023-04-14 RX ADMIN — FOLIC ACID 2 MG: 1 TABLET ORAL at 10:30

## 2023-04-14 RX ADMIN — SENNOSIDES AND DOCUSATE SODIUM 1 TABLET: 8.6; 5 TABLET ORAL at 18:15

## 2023-04-14 RX ADMIN — METHOCARBAMOL 750 MG: 750 TABLET ORAL at 06:32

## 2023-04-14 RX ADMIN — GABAPENTIN 100 MG: 100 CAPSULE ORAL at 22:00

## 2023-04-14 RX ADMIN — CEFAZOLIN SODIUM 1000 MG: 1 SOLUTION INTRAVENOUS at 21:06

## 2023-04-14 RX ADMIN — HYDROXYCHLOROQUINE SULFATE 200 MG: 200 TABLET ORAL at 07:42

## 2023-04-14 RX ADMIN — SENNOSIDES AND DOCUSATE SODIUM 1 TABLET: 8.6; 5 TABLET ORAL at 10:30

## 2023-04-14 RX ADMIN — GABAPENTIN 100 MG: 100 CAPSULE ORAL at 18:15

## 2023-04-14 RX ADMIN — METHOCARBAMOL 750 MG: 750 TABLET ORAL at 15:12

## 2023-04-14 RX ADMIN — NORTRIPTYLINE HYDROCHLORIDE 100 MG: 50 CAPSULE ORAL at 22:00

## 2023-04-14 RX ADMIN — LEVOTHYROXINE SODIUM 50 MCG: 50 TABLET ORAL at 05:24

## 2023-04-14 RX ADMIN — LORAZEPAM 0.5 MG: 0.5 TABLET ORAL at 10:31

## 2023-04-14 RX ADMIN — ROPINIROLE 2 MG: 2 TABLET, FILM COATED ORAL at 22:51

## 2023-04-14 RX ADMIN — CHOLECALCIFEROL TAB 25 MCG (1000 UNIT) 2000 UNITS: 25 TAB at 10:30

## 2023-04-14 RX ADMIN — HYDROXYCHLOROQUINE SULFATE 100 MG: 200 TABLET ORAL at 18:15

## 2023-04-14 NOTE — ASSESSMENT & PLAN NOTE
-L>R  -In the setting of likely chronic venous stasis dermatitis (patient reports chronic B/L lower extremity erythema)  -cont Ancef   -wound care

## 2023-04-14 NOTE — PLAN OF CARE
"  Problem: PHYSICAL THERAPY ADULT  Goal: Performs mobility at highest level of function for planned discharge setting  See evaluation for individualized goals  Description: Treatment/Interventions: Functional transfer training, LE strengthening/ROM, Elevations, Therapeutic exercise, Endurance training, Patient/family training, Equipment eval/education, Bed mobility, Gait training, Compensatory technique education, Spoke to nursing, OT (balance training)          See flowsheet documentation for full assessment, interventions and recommendations  Note: Prognosis: Good  Problem List: Decreased strength, Decreased endurance, Impaired balance, Decreased mobility, Decreased range of motion, Pain  Assessment: Pt is 65 y o  female seen for a PT evaluation s/p admit to [facility] on 4/13/2023  Pt presenting w/ principal dx of acute LBP of insidious onset  \"CT A/P showed superior endplate depression of the L5 vertebra and wedge compression deformity, age-indeterminate likely chronic, chronic compression fracture of the L3 vertebra with retropulsion posterior vertebral margin and moderate canal narrowing  \" Please see above for other active problem list / PMH  PT now consulted to assess functional mobility and needs for safe d/c planning  Prior to admission, pt was Girish w/ ambulation w/ RW, lives w/ sister in a [facility] w/ [address] + [address]  Currently pt requires ModAx1 for bed skills; ModAx2 for functional transfers; ModAx2 for limited ambulation w/ RW  Pt presents functioning below baseline and w/ overall mobility deficits 2* to: decreased LE strength; generalized weakness/deconditioning; decreased endurance; impaired balance; gait deviations; pain; fatigue; bed/chair alarms; multiple lines  These impairments place pt at risk for falls  Pt will continue to benefit from skilled PT interventions to address stated impairments; to maximize functional potential; for ongoing pt/ family training; and DME needs   PT is currently " recommending rehab  Barriers to Discharge: Inaccessible home environment     PT Discharge Recommendation: Post acute rehabilitation services    See flowsheet documentation for full assessment

## 2023-04-14 NOTE — PLAN OF CARE
Problem: OCCUPATIONAL THERAPY ADULT  Goal: Performs self-care activities at highest level of function for planned discharge setting  See evaluation for individualized goals  Description: Treatment Interventions: ADL retraining, Functional transfer training, Endurance training, Patient/family training, Equipment evaluation/education, Compensatory technique education, Continued evaluation          See flowsheet documentation for full assessment, interventions and recommendations  Note: Limitation: Decreased ADL status, Decreased Safe judgement during ADL, Decreased endurance, Decreased self-care trans, Decreased high-level ADLs  Prognosis: Fair  Assessment: Pt is a 65 y o  YO  female admitted to \[facility]\"" on 4/13/2023 w/ acute low back pain  CTA revealing chronic L5 superior endplate depression and chronic compression fx of L3 vertebrae  Per hospitalist jose to see for PT/OT  Pt  has a past medical history of Acute blood loss anemia, Aftercare following joint replacement, Anxiety, Arthritis, Depression, Disease of thyroid gland, Femur neck fracture ([facility] [address] ), GERD (gastroesophageal reflux disease), Hyperthyroidism, Osteoporosis, Polio, PVD (peripheral vascular disease) ([facility] [address] ), Right BBB/left ant fasc block, UTI (urinary tract infection), and Varicella infection  Pt with active OT orders and up and OOB as tolerated orders    Pt resides in a house with sister  pta pt reports sister assists w/ LB ADls and sponge bathing, able to walk w/ Mod I w/ RW, assist for all IADLs  Currently pt is mod a for ub adls, max a for lb adls, and mod a for grooming  Pt is limited at this time 2*: pain, endurance, activity tolerance, functional mobility, decreased I w/ ADLS/IADLS, decreased safety awareness and decreased insight into deficits  The following Occupational Performance Areas to address include: bathing/shower, toilet hygiene, dressing and functional mobility   Based on the aforementioned OT evaluation, functional performance deficits, and assessments, pt has been identified as a high complexity evaluation  From OT standpoint, anticipate d/c STR  Pt to continue to benefit from acute immediate OT services to address the following goals 2-3x/week to  w/in 10-14 days: The patient's raw score on the AM-PAC Daily Activity Inpatient Short Form is 15  A raw score of less than 19 suggests the patient may benefit from discharge to post-acute rehabilitation services  Please refer to the recommendation of the Occupational Therapist for safe discharge planning          OT Discharge Recommendation: Post acute rehabilitation services

## 2023-04-14 NOTE — CONSULTS
Consultation - [facility] [place] 65 y o  female MRN: [id number]  Unit/Bed#: -01 Encounter: [id number]    Assessment:  Healed wound      Plan:  • No open wound noted to the b/l lower extremities  o IV ABX as per primary service   • Will recommend preventative nursing skin care measures  • Patient verbalized understanding of plan of care  • Wound care will sign off, please re-consult if needed     History of Present Illness:  Patient is a 67-year-old female who is admitted to the hospital for acute low back pain  Patient is also being treated for lower extremity cellulitis with IV antibiotics as per the primary service  Patient has a history of lupus, rheumatoid arthritis, and hypothyroidism due to Hashimoto's  Wound care consulted for left lower extremity wound  Patient seen in bed, patient is alert and oriented x4, cooperative and agreeable for the assessment  Patient reports the wound to her left lower extremity began as a blister which she accidentally scratched open  Patient believes that the wound is healed at this time  Patient reports the redness to her lower extremities is chronic and unchanged from her baseline  Patient self reports that she is ambulatory incontinent at baseline  Able to turn with moderate assistance at time of the assessment  Denies fever or chills  Subjective:    Review of Systems   Constitutional: Negative for chills and fever  HENT: Negative for congestion and sneezing  Respiratory: Negative for cough and shortness of breath  Musculoskeletal: Positive for gait problem  Skin: Positive for wound  LLE   Psychiatric/Behavioral: Negative for agitation  Historical Information   Past Medical History:   Diagnosis Date   • Acute blood loss anemia 9/9/2020   • Aftercare following joint replacement 9/9/2020    Patient had right reversed total shoulder arthroplasty   Pain was controlled when he left the hospital      • Anxiety    • Arthritis    • Depression    • Disease of thyroid gland     HYPO   • Femur neck fracture ([facility])    • GERD (gastroesophageal reflux disease)    • Hyperthyroidism     RESOLVED: [id number]   • Osteoporosis    • Polio    • PVD (peripheral vascular disease) ([facility])    • Right BBB/left ant fasc block    • UTI (urinary tract infection)    • Varicella infection     LAST ASSESSED: [id number]     Past Surgical History:   Procedure Laterality Date   • APPENDECTOMY     • HIP ARTHROPLASTY Left 11/27/2017    Procedure: REMOVAL IM NAIL CONVERSION TO TOTAL HIP ARTHROPLASTY POSTERIOR APPROACH;  Surgeon: Sandro Mccullough MD;  Location: [facility];  Service: Orthopedics   • HIP FRACTURE SURGERY     • HIP SURGERY      both hips replaced;2013 left ,2014 right   • JOINT REPLACEMENT Right     HIP TOTAL   • PA ARTHROPLASTY GLENOHUMERAL JOINT TOTAL SHOULDER Right 9/2/2020    Procedure: ARTHROPLASTY SHOULDER REVERSE;  Surgeon: Davida James MD;  Location: [facility];  Service: Orthopedics   • PA ARTHROPLASTY GLENOHUMERAL JOINT TOTAL SHOULDER Left 6/1/2021    Procedure: ARTHROPLASTY SHOULDER REVERSE;  Surgeon: Davida James MD;  Location[facility];  Service: Orthopedics   • PA CONV PREV HIP TOT HIP ARTHRP W/WO AGRFT/ALGRFT Left 10/4/2017    Procedure: Maurie Amy removal of left hip;  Surgeon: Sandro Mccullough MD;  Location: [facility];  Service: Orthopedics   •  [address] SURG W/RLS TRANSVRS CARPL LIGM Right 5/24/2022    Procedure: RELEASE CARPAL TUNNEL ENDOSCOPIC-right;  Surgeon: Diaz Phillips MD;  Location: [facility];  Service: Orthopedics   • REVISION TOTAL HIP ARTHROPLASTY Right    • TOE AMPUTATION Left 7/24/2022    Procedure: 5TH METATARSAL RESECTION WITH BOTTOM FLAP CLOSURE;  Surgeon: Manuel Milligan DPM;  Location: [facility];  Service: Podiatry   • TONSILLECTOMY       Social History   Social History     Substance and Sexual Activity   Alcohol Use Not Currently     Social History     Substance and Sexual Activity   Drug Use Not Currently E-Cigarette/Vaping   • E-Cigarette Use Never User      E-Cigarette/Vaping Substances   • Nicotine No    • THC No    • CBD No    • Flavoring No    • Other No    • Unknown No      Social History     Tobacco Use   Smoking Status Former   Smokeless Tobacco Never   Tobacco Comments    quit 20-30 years ago     Family History:   Family History   Problem Relation Age of Onset   • Rheum arthritis Mother    • Rheum arthritis Sister    • Prostate cancer Brother        Meds/Allergies   current meds:   Current Facility-Administered Medications   Medication Dose Route Frequency   • acetaminophen (TYLENOL) tablet 650 mg  650 mg Oral Q6H PRN   • ascorbic acid (VITAMIN C) tablet 500 mg  500 mg Oral Daily   • ceFAZolin (ANCEF) IVPB (premix in dextrose) 1,000 mg 50 mL  1,000 mg Intravenous Q8H [address]   • cholecalciferol (VITAMIN D3) tablet 2,000 Units  2,000 Units Oral Daily   • enoxaparin (LOVENOX) subcutaneous injection 40 mg  40 mg Subcutaneous Daily   • folic acid (FOLVITE) tablet 2 mg  2 mg Oral Daily   • gabapentin (NEURONTIN) capsule 100 mg  100 mg Oral TID   • HYDROmorphone (DILAUDID) injection 0 5 mg  0 5 mg Intravenous Q6H PRN   • hydroxychloroquine (PLAQUENIL) tablet 100 mg  100 mg Oral QPM   • hydroxychloroquine (PLAQUENIL) tablet 200 mg  200 mg Oral Daily With Breakfast   • levothyroxine tablet 50 mcg  50 mcg Oral Early Morning   • LORazepam (ATIVAN) tablet 0 5 mg  0 5 mg Oral Q8H PRN   • melatonin tablet 3 mg  3 mg Oral HS   • methocarbamol (ROBAXIN) tablet 750 mg  750 mg Oral Q6H PRN   • nortriptyline (PAMELOR) capsule 100 mg  100 mg Oral HS   • rOPINIRole (REQUIP) tablet 2 mg  2 mg Oral HS   • senna-docusate sodium (SENOKOT S) 8 6-50 mg per tablet 1 tablet  1 tablet Oral BID     No Known Allergies    Objective   Vitals: Blood pressure 141/75, pulse 76, temperature 97 5 °F (36 4 °C), resp  rate 16, SpO2 96 %, not currently breastfeeding  Wounds:     Wound 07/21/22  Leg Left; Lower (Active)   Wound Image 04/14/23 0821   Wound Length (cm) 0 cm 04/14/23 0821   Wound Width (cm) 0 cm 04/14/23 0821   Wound Depth (cm) 0 cm 04/14/23 0821   Wound Surface Area (cm^2) 0 cm^2 04/14/23 0821   Wound Volume (cm^3) 0 cm^3 04/14/23 0821   Calculated Wound Volume (cm^3) 0 cm^3 04/14/23 0821   Change in Wound Size % 100 04/14/23 1609         Physical Exam  Constitutional:       General: She is awake  She is not in acute distress  Appearance: She is not ill-appearing, toxic-appearing or diaphoretic  HENT:      Head: Normocephalic and atraumatic  Right Ear: External ear normal       Left Ear: External ear normal    Eyes:      Conjunctiva/sclera: Conjunctivae normal    Cardiovascular:      Pulses:           Dorsalis pedis pulses are 2+ on the right side and 2+ on the left side  Posterior tibial pulses are 2+ on the right side and 2+ on the left side  Pulmonary:      Effort: Pulmonary effort is normal  No respiratory distress  Genitourinary:     Comments: purewick   Musculoskeletal:      Right lower leg: No edema  Left lower leg: No edema  Skin:     General: Skin is warm and dry  Findings: Erythema and wound present  Comments: 1  L lateral lower extremity with areas of intact dry scar tissue and scabbing present  No open wound or drainage  2  B/l heels are dry and intact without redness or wounds  3  B/l sacro-buttocks are intact without redness or wounds  Noted intact blanchable scar tissue to the R buttocks  4  Erythema to the b/l lower extremities (generalized) patient reports is stable    No induration, fluctuance, odor, warmth/temperature differences, redness, or purulence noted to the above mentioned wounds and skin areas assessed  New dressings applied as noted above  Patient tolerated assessment well- denies pain and no s/s of non-verbal pain or discomfort observed during the encounter  Neurological:      Mental Status: She is alert and oriented to person, place, and time        Gait: "Gait abnormal    Psychiatric:         Mood and Affect: Mood normal          Behavior: Behavior normal  Behavior is cooperative  Lab, Imaging and other studies: I have personally reviewed pertinent reports  Code Status: Level 1 - Full Code      Counseling / Coordination of Care  Total time spent today:    Total time (face-to-face and non-face-to-face) spent on today's visit was 15 minutes  This includes preparation for the visits (H&P on 4/13/23 ) performance of a medically appropriate history and examination, and orders for medications/treatments or testing  Discussed assessment findings, and plan of care/recommendations with patients RN  CONNIE Hairston, NACHO      Portions of the record may have been created with voice recognition software  Occasional wrong word or \"sound a like\" substitutions may have occurred due to the inherent limitations of voice recognition software    Read the chart carefully and recognize, using context, where substitutions have occurred      "

## 2023-04-14 NOTE — PROGRESS NOTES
[facility]  Progress Note  Name: Isak Mastersno I  MRN: [id number]  Unit/Bed#: -01 I Date of Admission: 4/13/2023   Date of Service: 4/14/2023 I Hospital Day: 1    Assessment/Plan   Cellulitis of left lower extremity  Assessment & Plan  -L>R  -In the setting of likely chronic venous stasis dermatitis (patient reports chronic B/L lower extremity erythema)  -cont Ancef   -wound care    * Acute low back pain  Assessment & Plan  -No bowel or bladder incontinence  -With associated radiculopathy of the right lower extremity with positive straight leg raise test  -CT A/P showed superior endplate depression of the L5 vertebra and wedge compression deformity, age-indeterminate likely chronic, chronic compression fracture of the L3 vertebra with retropulsion posterior vertebral margin and moderate canal narrowing   -Start Neurontin  -check MRI L-spine  -Continue Tylenol as needed, Dilaudid as needed  -Can consider further dosing with Toradol but would rather order 1 dose at a time with risk/benefit analysis of NSAID dosing on each occasion (received Toradol in ER)  -PT/OT    Other forms of systemic lupus erythematosus (HCC)  Assessment & Plan  -Continue Plaquenil  -holding MTX with cellulitis    Rheumatoid arthritis involving multiple sites with positive rheumatoid factor (HCC)  Assessment & Plan  -Continue Plaquenil  -holding MTX with cellulitis    Hypothyroidism due to Hashimoto's thyroiditis  Assessment & Plan  -cont Levoxyl           VTE Pharmacologic Prophylaxis: VTE Score: 4 Lovenox    Patient Centered Rounds: I performed bedside rounds with nursing staff today  Education and Discussions with Family / Patient: Updated  (niece) via phone      Total Time Spent on Date of Encounter in care of patient: 25 minutes This time was spent on one or more of the following: performing physical exam; counseling and coordination of care; obtaining or reviewing history; documenting in the medical record; reviewing/ordering tests, medications or procedures; communicating with other healthcare professionals and discussing with patient's family/caregivers  Current Length of Stay: 1 day(s)  Current Patient Status: Inpatient   Certification Statement: The patient will continue to require additional inpatient hospital stay due to cellulitis, LBP  Discharge Plan: Anticipate discharge in 48-72 hrs to home with home services  Code Status: Level 1 - Full Code    Subjective:   Pt denies back pain rest  Denies SOB/CP  Objective:     Vitals:   Temp (24hrs), Av 1 °F (36 7 °C), Min:97 5 °F (36 4 °C), Max:98 9 °F (37 2 °C)    Temp:  [97 5 °F (36 4 °C)-98 9 °F (37 2 °C)] 97 5 °F (36 4 °C)  HR:  [76-86] 76  Resp:  [16] 16  BP: (134-170)/(65-84) 141/75  SpO2:  [96 %-99 %] 96 %  There is no height or weight on file to calculate BMI  Input and Output Summary (last 24 hours): Intake/Output Summary (Last 24 hours) at 2023 0912  Last data filed at 2023 2238  Gross per 24 hour   Intake 1050 ml   Output 550 ml   Net 500 ml       Physical Exam:   Gen: NAD, AAOx3, appears chronically ill malnourished  Eyes: EOMI, PERRLA, no scleral icterus  ENMT:  no nasal discharge, no otic discharge, moist mucous membranes  Neck:  Supple  Cardiovascular: remains Regular rate and rhythm, normal S1-S2, no murmurs, rubs, or gallops  Lungs:  Clear to auscultation bilaterally, no wheezes, or rales, or rhonchi  Abdomen:  Positive bowel sounds, soft, nontender, nondistended, no palpable organomegaly   Skin: Trace bilateral lower extremity edema, left greater than right lower extremity cellulitis (improved from yesterday)  Left leg with 1 shallow/superficial ulcer without discharge    Another small, approximately 1 cm area of purpura  Neuro: Cranial nerves 2-12 are intact, non-focal,moves all 4 extremities, RLE POS straight leg raise test    Additional Data:     Labs:  Results from last 7 days   Lab Units 04/13/23  0819   WBC Thousand/uL 4 77   HEMOGLOBIN g/dL 9 5*   HEMATOCRIT % 30 9*   PLATELETS Thousands/uL 267   NEUTROS PCT % 70   LYMPHS PCT % 16   MONOS PCT % 11   EOS PCT % 2     Results from last 7 days   Lab Units 04/13/23  0640   SODIUM mmol/L 138   POTASSIUM mmol/L 3 5   CHLORIDE mmol/L 109*   CO2 mmol/L 25   BUN mg/dL 17   CREATININE mg/dL 0 52*   ANION GAP mmol/L 4   CALCIUM mg/dL 8 8   ALBUMIN g/dL 2 8*   TOTAL BILIRUBIN mg/dL 0 36   ALK PHOS U/L 120*   ALT U/L 23   AST U/L 21   GLUCOSE RANDOM mg/dL 81     Results from last 7 days   Lab Units 04/13/23  0640   INR  0 84             Results from last 7 days   Lab Units 04/13/23  0640   LACTIC ACID mmol/L 1 7   PROCALCITONIN ng/ml 0 12       Lines/Drains:  Invasive Devices     Peripheral Intravenous Line  Duration           Peripheral IV 04/13/23 Distal;Dorsal (posterior); Left Forearm 1 day          Drain  Duration           External Urinary Catheter 53 days                Recent Cultures (last 7 days):   Results from last 7 days   Lab Units 04/13/23  0640   BLOOD CULTURE  Received in Microbiology Lab  Culture in Progress  Received in Microbiology Lab  Culture in Progress         Last 24 Hours Medication List:   Current Facility-Administered Medications   Medication Dose Route Frequency Provider Last Rate   • acetaminophen  650 mg Oral Q6H PRN Gabby Bean MD     • ascorbic acid  500 mg Oral Daily Gabby Bean MD     • cefazolin  1,000 mg Intravenous [facility] Gabby Bean MD Stopped (04/14/23 2934)   • cholecalciferol  2,000 Units Oral Daily Gabby Bean MD     • enoxaparin  40 mg Subcutaneous Daily Gabby Bean MD     • folic acid  2 mg Oral Daily Gabby Bean MD     • gabapentin  100 mg Oral TID Gabby Bean MD     • HYDROmorphone  0 5 mg Intravenous Q6H PRN Gabby Bean MD     • hydroxychloroquine  100 mg Oral QPM Gabby Bean MD     • hydroxychloroquine  200 mg Oral Daily With Breakfast Gabby Bean MD     • levothyroxine  50 mcg Oral Early Morning Jaimie Valente MD     • LORazepam  0 5 mg Oral Q8H PRN Jaimie Valente MD     • melatonin  3 mg Oral HS Jaimie Valente MD     • methocarbamol  750 mg Oral Q6H PRN Jaimie Valente MD     • nortriptyline  100 mg Oral HS Jaimie Valente MD     • rOPINIRole  2 mg Oral HS Jaimie Valente MD     • senna-docusate sodium  1 tablet Oral BID Jaimie Valente MD          Today, Patient Was Seen By: Jaimie Valente MD    **Please Note: This note may have been constructed using a voice recognition system  **

## 2023-04-14 NOTE — PHYSICAL THERAPY NOTE
Physical Therapy Evaluation    Patient's Name: Bailee Walker Colon    Admitting Diagnosis  Back pain [M54 9]  Cellulitis [L03 90]  Ulcers of both lower legs ([facility] [address] ) [L90 918, L98 924]    Problem List  Patient Active Problem List   Diagnosis    Anxiety    RLS (restless legs syndrome)    S/P total hip arthroplasty    Hypothyroidism due to Hashimoto's thyroiditis    Age-related osteoporosis without current pathological fracture    RBBB    Ambulatory dysfunction    Closed compression fracture of L3 lumbar vertebra    Rheumatoid arthritis involving multiple sites with positive rheumatoid factor ([facility])    Chronic pain syndrome    Vitamin D deficiency    Dyspepsia    Cellulitis of left lower extremity    Other forms of systemic lupus erythematosus ([facility] [address] )    Depression    Shoulder dislocation    Rupture long head biceps tendon, left, initial encounter    S/P reverse total shoulder arthroplasty, left    Shoulder pain, bilateral    Failed orthopedic implant ([facility])    Venous insufficiency    Hyponatremia    Anemia    Pressure injury of left foot, stage 4 ([facility])    Non-healing open wound of heel, initial encounter    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[facility])    Leg pain    Dysphagia    Concern for cerebral contusion    Arm bruise, right, initial encounter    Effusion of right shoulder joint    Acute low back pain       Past Medical History  Past Medical History:   Diagnosis Date    Acute blood loss anemia 9/9/2020    Aftercare following joint replacement 9/9/2020    Patient had right reversed total shoulder arthroplasty   Pain was controlled when he left the hospital       Anxiety     Arthritis     Depression     Disease of thyroid gland     HYPO    Femur neck fracture ([facility])     GERD (gastroesophageal reflux disease)     Hyperthyroidism     RESOLVED: [id number]    Osteoporosis     Polio     PVD (peripheral vascular disease) ([facility])     Right BBB/left ant fasc block     UTI (urinary tract infection)     Varicella infection     LAST ASSESSED: [id number]       Past Surgical History  Past Surgical History:   Procedure Laterality Date    APPENDECTOMY      HIP ARTHROPLASTY Left 11/27/2017    Procedure: REMOVAL IM NAIL CONVERSION TO TOTAL HIP ARTHROPLASTY POSTERIOR APPROACH;  Surgeon: Giovanna Martinez MD;  [facility];  Service: Orthopedics    HIP FRACTURE SURGERY      HIP SURGERY      both hips replaced;2013 left ,2014 right    JOINT REPLACEMENT Right     HIP TOTAL    WI ARTHROPLASTY GLENOHUMERAL JOINT TOTAL SHOULDER Right 9/2/2020    Procedure: ARTHROPLASTY SHOULDER REVERSE;  Surgeon: Blanca Jones MD;  [facility];  Service: Orthopedics    WI ARTHROPLASTY GLENOHUMERAL JOINT TOTAL SHOULDER Left 6/1/2021    Procedure: ARTHROPLASTY SHOULDER REVERSE;  Surgeon: Blanca Jones MD;  [facility];  Service: Orthopedics    WI CONV PREV HIP TOT HIP ARTHRP W/WO AGRFT/ALGRFT Left 10/4/2017    Procedure: Hareware removal of left hip;  Surgeon: Giovanna Martinez MD;  [facility];  Service: Orthopedics    WI [address] [facility] 3 S Floors WRST SURG W/RLS TRANSVRS CARPL LIGM Right 5/24/2022    Procedure: RELEASE CARPAL TUNNEL ENDOSCOPIC-right;  Surgeon: Dajuan Hansen MD;  [facility];  Service: Orthopedics    REVISION TOTAL HIP ARTHROPLASTY Right     TOE AMPUTATION Left 7/24/2022    Procedure: 5TH METATARSAL RESECTION WITH BOTTOM FLAP CLOSURE;  Surgeon: Elijah Pittman DPM;  Location: [facility];  Service: Podiatry    TONSILLECTOMY          04/14/23 1141   PT Last Visit   PT Visit Date 04/14/23   Note Type   Note type Evaluation   Pain Assessment   Pain Assessment Tool 0-10   Pain Score 8   Pain Location/Orientation Orientation: Lower; Location: Back   Pain Radiating Towards right leg   Hospital Pain Intervention(s) Cold applied   Restrictions/Precautions   Weight Bearing Precautions Per Order No   Braces or Orthoses   (pt prefers to wear shoes when ambulating - donned for PT session)   Other Precautions Chair Alarm; Bed Alarm;Multiple lines; Fall Risk;Pain   Home Living   Type of [address] Two level;1/2 bath on main level; Able to live on main level with bedroom/bathroom  (3 VILMA; FFSU)   Bathroom Shower/Tub   (sponge bathes at baseline)   Bathroom Toilet Raised   Home Equipment Walker;Cane   Prior Function   Level of [place] Independent with functional mobility; Needs assistance with ADLs; Needs assistance with IADLS  (Girish w/ RW at baseline)   Lives With   (sister)   Receives Help From Family; Other (Comment)  (sister + local niece)   IADLs Family/Friend/Other provides transportation   Falls in the last 6 months 1 to 4  (1-2 per pt)   General   Family/Caregiver Present No   Cognition   Attention Attends with cues to redirect   Orientation Level Oriented X4   Comments slightly lethargic due to recent medication administration but increased alertness throughout eval   Subjective   Subjective Pt agreeable to mobilize  RLE Assessment   RLE Assessment   (grossly 3-/5)   LLE Assessment   LLE Assessment   (grossly 3-/5, buckling during t/f, maintains LLE in internal rotation)   Bed Mobility   Supine to Sit 3  Moderate assistance   Additional items Assist x 1;HOB elevated; Increased time required;Verbal cues;LE management   Sit to Supine Unable to assess   Additional Comments Pt greeted in supine  Transfers   Sit to Stand 3  Moderate assistance   Additional items Assist x 2; Increased time required;Verbal cues   Stand to Sit 3  Moderate assistance   Additional items Assist x 2; Increased time required;Verbal cues   Stand pivot 2  Maximal assistance   Additional items Assist x 2; Increased time required;Verbal cues   Additional Comments RW   Ambulation/Elevation   Gait pattern Excessively slow; Short stride; Antalgic; Improper Weight shift;L Knee Erendira;Knees flexed  (L hip internal rotation)   Gait Assistance 3  Moderate assist   Additional items Assist x 2;Verbal cues; Tactile cues   Assistive Device Rolling walker "  Distance 3' bed>chair   Stair Management Assistance Not tested   Balance   Static Sitting Poor +   Dynamic Sitting Poor   Static Standing Poor -   Dynamic Standing Poor -   Ambulatory Poor -  (RW)   Endurance Deficit   Endurance Deficit Yes   Activity Tolerance   Activity Tolerance Patient limited by fatigue;Patient limited by pain   Medical Staff [facility]   Nurse Made Aware yes - cleared + updated   Assessment   Prognosis Good   Problem List Decreased strength;Decreased endurance; Impaired balance;Decreased mobility; Decreased range of motion;Pain   Assessment Pt is 65 y o  female seen for a PT evaluation s/p admit to [facility] on 4/13/2023  Pt presenting w/ principal dx of acute LBP of insidious onset  \"CT A/P showed superior endplate depression of the L5 vertebra and wedge compression deformity, age-indeterminate likely chronic, chronic compression fracture of the L3 vertebra with retropulsion posterior vertebral margin and moderate canal narrowing  \" Please see above for other active problem list / PMH  PT now consulted to assess functional mobility and needs for safe d/c planning  Prior to admission, pt was Girish w/ ambulation w/ RW, lives w/ sister in a [address] w/ [address] VILMA + [address]  Currently pt requires ModAx1 for bed skills; ModAx2 for functional transfers; ModAx2 for limited ambulation w/ RW  Pt presents functioning below baseline and w/ overall mobility deficits 2* to: decreased LE strength; generalized weakness/deconditioning; decreased endurance; impaired balance; gait deviations; pain; fatigue; bed/chair alarms; multiple lines  These impairments place pt at risk for falls  Pt will continue to benefit from skilled PT interventions to address stated impairments; to maximize functional potential; for ongoing pt/ family training; and DME needs  PT is currently recommending rehab     Barriers to Discharge Inaccessible home environment   Goals   Patient Goals to eat   STG Expiration Date " 04/28/23   Short Term Goal #1 In 14 days pt will complete: 1) Bed mobility skills with Girish to facilitate safe return to previous living environment  2) Functional transfers with Girish to facilitate safe return to previous living environment  3) Ambulation with ' w/ Girish without LOB for safe ambulation in home/community environment  4) Improve balance scores by 1 grade to decrease fall risk  5) Improve LE strength grades by 1 to increase independence w/ all functional mobility, transfers and gait  6) PT for ongoing pt and family education; DME needs and D/C planning to promote highest level of function in least restrictive environment  7) Stair training up/ down 3 steps with most appropriate technique and Girish for safe access to previous living environment and to increase community access  PT Treatment Day 0   Plan   Treatment/Interventions Functional transfer training;LE strengthening/ROM; Elevations; Therapeutic exercise; Endurance training;Patient/family training;Equipment eval/education; Bed mobility;Gait training; Compensatory technique education;Spoke to nursing;OT  (balance training)   PT Frequency 3-5x/wk   Recommendation   PT Discharge Recommendation Post acute rehabilitation services   AM-PAC Basic Mobility Inpatient   Turning in Flat Bed Without Bedrails 2   Lying on Back to Sitting on Edge of Flat Bed Without Bedrails 2   Moving Bed to Chair 1   Standing Up From Chair Using Arms 1   Walk in Room 1   Climb 3-5 Stairs With Railing 1   Basic Mobility Inpatient Raw Score 8   Highest Level Of Mobility   JH-HLM Goal 3: Sit at edge of bed   JH-HLM Achieved 4: Move to chair/commode   End of Consult   Patient Position at End of Consult Bedside chair;Bed/Chair alarm activated; All needs within reach  (on waffle cushion, CP to low back, all lines in tact)     Darian Jefferson, PT, DPT

## 2023-04-14 NOTE — PROGRESS NOTES
Pastoral Care Progress Note    2023  Patient: Yanely Horton : 1945  Admission Date & Time: 2023 0551  MRN: [id number] [facility]: [id number]       23   Spiritual Beliefs/Perceptions   Concept of God Accepting   Plan of Care   Comments Paged to come to visit with [place]  She was saying that she has been cursed  Upon visiting, she mentioned that her daughter Hakan Ochoa)  five years ago (61 yo) and her grandson was killed in a MVA  She told me that she still sees her daughter  She sees her on the TV  I listened and prayed with her and blessed her  I told her that sometimes when we lose someone we love so deeply that we still hear them and speak with them, because we miss them so much  She seemed to be comforted by that

## 2023-04-14 NOTE — ASSESSMENT & PLAN NOTE
-No bowel or bladder incontinence  -With associated radiculopathy of the right lower extremity with positive straight leg raise test  -CT A/P showed superior endplate depression of the L5 vertebra and wedge compression deformity, age-indeterminate likely chronic, chronic compression fracture of the L3 vertebra with retropulsion posterior vertebral margin and moderate canal narrowing   -Start Neurontin  -check MRI L-spine  -Continue Tylenol as needed, Dilaudid as needed  -Can consider further dosing with Toradol but would rather order 1 dose at a time with risk/benefit analysis of NSAID dosing on each occasion (received Toradol in ER)  -PT/OT

## 2023-04-14 NOTE — OCCUPATIONAL THERAPY NOTE
Occupational Therapy Evaluation     Patient Name: Mary Gordon  Today's Date: 4/14/2023  Problem List  Principal Problem:    Acute low back pain  Active Problems:    Hypothyroidism due to Hashimoto's thyroiditis    Rheumatoid arthritis involving multiple sites with positive rheumatoid factor (HCC)    Cellulitis of left lower extremity    Other forms of systemic lupus erythematosus ([facility] [address] )    Past Medical History  Past Medical History:   Diagnosis Date    Acute blood loss anemia 9/9/2020    Aftercare following joint replacement 9/9/2020    Patient had right reversed total shoulder arthroplasty   Pain was controlled when he left the hospital       Anxiety     Arthritis     Depression     Disease of thyroid gland     HYPO    Femur neck fracture (H[facility])     GERD (gastroesophageal reflux disease)     Hyperthyroidism     RESOLVED: [id number]    Osteoporosis     Polio     PVD (peripheral vascular disease) ([facility])     Right BBB/left ant fasc block     UTI (urinary tract infection)     Varicella infection     LAST ASSESSED: [id number]     Past Surgical History  Past Surgical History:   Procedure Laterality Date    APPENDECTOMY      HIP ARTHROPLASTY Left 11/27/2017    Procedure: REMOVAL IM NAIL CONVERSION TO TOTAL HIP ARTHROPLASTY POSTERIOR APPROACH;  Surgeon: Reed Bailon MD;  [facility];  Service: Orthopedics    HIP FRACTURE SURGERY      HIP SURGERY      both hips replaced;2013 left ,2014 right    JOINT REPLACEMENT Right     HIP TOTAL    [place] ARTHROPLASTY GLENOHUMERAL JOINT TOTAL SHOULDER Right 9/2/2020    Procedure: ARTHROPLASTY SHOULDER REVERSE;  Surgeon: Rena Daniel MD;  [facility];  Service: Orthopedics    VT ARTHROPLASTY GLENOHUMERAL JOINT TOTAL SHOULDER Left 6/1/2021    Procedure: ARTHROPLASTY SHOULDER REVERSE;  Surgeon: Rena Daniel MD;  [facility];  Service: Orthopedics    VT CONV PREV HIP TOT HIP ARTHRP W/WO AGRFT/ALGRFT Left 10/4/2017    Procedure: Hareware removal of left hip; "Surgeon: Juan Villar MD;  [facility]: [facility];  Service: Orthopedics    [place] [address] Floors WRST SURG W/RLS TRANSVRS CARPL LIGM Right 5/24/2022    Procedure: RELEASE CARPAL TUNNEL ENDOSCOPIC-right;  Surgeon: Hallie Adams MD;  [facility];  Service: Orthopedics    REVISION TOTAL HIP ARTHROPLASTY Right     TOE AMPUTATION Left 7/24/2022    Procedure: 5TH METATARSAL RESECTION WITH BOTTOM FLAP CLOSURE;  Surgeon: Nahum Foster DPM;  Location: [facility];  Service: Podiatry    TONSILLECTOMY           04/14/23 1142   OT Last Visit   OT Visit Date 04/14/23   Note Type   Note type Evaluation   Pain Assessment   Pain Assessment Tool 0-10   Pain Score 8   Pain Location/Orientation Location: Back   Home Living   Type of [address] [place] [address] Two level; Able to live on main level with bedroom/bathroom   Bathroom Shower/Tub   (sponge bathes, only 1/2 bath on first floor)   Bathroom Toilet Raised   Home Equipment Walker;Cane   Prior Function   Level of [place] Needs assistance with ADLs; Needs assistance with IADLS  (assist for LB ADLs)   Lives With Family  (sister)   Receives Help From Family   IADLs Family/Friend/Other provides transportation   Falls in the last 6 months 1 to 4  (23)   Vocational On disability   Lifestyle   Autonomy pta pt reports sister assists w/ LB ADls and sponge bathing, able to walk w/ Mod I w/ RW, assist for all IADLs   Reciprocal Relationships lives w/ sister   Service to Others on disability   Intrinsic Gratification enjoys watching tv   Subjective   Subjective \"They just gave me meds\"   ADL   Where Assessed Chair   Eating Assistance 4  Minimal Assistance   Grooming Assistance 4  Minimal Assistance   UB Bathing Assistance 3  Moderate Assistance   LB Bathing Assistance 2  Maximal Assistance   [address] 3  Moderate Assistance   LB Dressing Assistance 2  Maximal [address] [place] [address]  2  Maximal Assistance   Bed Mobility   Supine to Sit 3  Moderate assistance " Additional items Assist x 1   Transfers   Sit to Stand 3  Moderate assistance   Additional items Assist x 2; Increased time required   Stand to Sit 3  Moderate assistance   Additional items Assist x 2; Increased time required   Stand pivot 3  Moderate assistance   Additional items Assist x 2; Increased time required  (knee buckling noted)   Functional Mobility   Functional Mobility 3  Moderate assistance   Additional Comments a x 2   Additional items Hand hold assistance   Balance   Static Sitting Poor +   Dynamic Sitting Poor   Static Standing Poor -   Dynamic Standing Poor -   Ambulatory Poor -   Activity Tolerance   Activity Tolerance Patient limited by pain   Medical Staff Made Aware PT anya 2* medical complexity/comorbdities   Nurse Made Aware okay to see per RN   RUE Assessment   RUE Assessment WFL  (reports this is baseline since previous UE injury, able to only flex shoulders to 45 degrees)   LUE Assessment   LUE Assessment WFL  (reports this is baseline since previous UE injury, able to only flex shoulders to 45 degrees)   Hand Function   Gross Motor Coordination Functional   Fine Motor Coordination Functional   Cognition   Overall Cognitive Status WFL   Arousal/Participation Lethargic   Attention Attends with cues to redirect   Orientation Level Oriented X4   Memory Decreased recall of precautions   Following Commands Follows one step commands with increased time or repetition   Comments slightly lethargic 2* recent medication administration   Assessment   Limitation Decreased ADL status; Decreased Safe judgement during ADL;Decreased endurance;Decreased self-care trans;Decreased high-level ADLs   Prognosis Fair   Assessment Pt is a 65 y o  YO  female admitted to [facility] on 4/13/2023 w/ acute low back pain  CTA revealing chronic L5 superior endplate depression and chronic compression fx of L3 vertebrae  Per hospitalist okay to see for PT/OT   Pt  has a past medical history of Acute blood loss anemia, Aftercare following joint replacement, Anxiety, Arthritis, Depression, Disease of thyroid gland, Femur neck fracture ([facility] [address] ), GERD (gastroesophageal reflux disease), Hyperthyroidism, Osteoporosis, Polio, PVD (peripheral vascular disease) ([facility] [address] ), Right BBB/left ant fasc block, UTI (urinary tract infection), and Varicella infection  Pt with active OT orders and up and OOB as tolerated orders    Pt resides in a house with sister  pta pt reports sister assists w/ LB ADls and sponge bathing, able to walk w/ Mod I w/ RW, assist for all IADLs  Currently pt is mod a for ub adls, max a for lb adls, and mod a for grooming  Pt is limited at this time 2*: pain, endurance, activity tolerance, functional mobility, decreased I w/ ADLS/IADLS, decreased safety awareness and decreased insight into deficits  The following Occupational Performance Areas to address include: bathing/shower, toilet hygiene, dressing and functional mobility  Based on the aforementioned OT evaluation, functional performance deficits, and assessments, pt has been identified as a high complexity evaluation  From OT standpoint, anticipate d/c STR  Pt to continue to benefit from acute immediate OT services to address the following goals 2-3x/week to  w/in 10-14 days: The patient's raw score on the AM-PAC Daily Activity Inpatient Short Form is 15  A raw score of less than 19 suggests the patient may benefit from discharge to post-acute rehabilitation services  Please refer to the recommendation of the Occupational Therapist for safe discharge planning      Goals   Patient Goals to eat   LTG Time Frame 10-14   Plan   Treatment Interventions ADL retraining;Functional transfer training; Endurance training;Patient/family training;Equipment evaluation/education; Compensatory technique education;Continued evaluation   Goal Expiration Date 23   OT Frequency 2-3x/wk   Recommendation   OT Discharge Recommendation Post acute rehabilitation services   AM-[facility] Daily Activity Inpatient   Lower Body Dressing 2   Bathing 2   Toileting 2   Upper Body Dressing 3   Grooming 3   Eating 3   Daily Activity Raw Score 15   Daily Activity Standardized Score (Calc for Raw Score >=11) 34 69   AM-PAC Applied Cognition Inpatient   Following a Speech/Presentation 3   Understanding Ordinary Conversation 4   Taking Medications 3   Remembering Where Things Are Placed or Put Away 3   Remembering List of 4-5 Errands 3   Taking Care of Complicated Tasks 3   Applied Cognition Raw Score 19   Applied Cognition Standardized Score 39 77   Modified Cari Scale   Modified Cari Scale 4       GOALS    1) Pt will increase activity tolerance to G for 30 min txment sessions    2) Pt will complete UB/LB dressing/self care w/ S using adaptive device and DME as needed    3) Pt will complete bathing w/ Sw/ use of AE and DME as needed    4) Pt will complete toileting w/ mod I w/ G hygiene/thoroughness using DME as needed    5) Pt will improve functional transfers to Mod I on/off all surfaces using DME as needed w/ G balance/safety     6) Pt will improve functional mobility during ADL/IADL/leisure tasks to Mod I using DME as needed w/ G balance/safety     7) Pt will participate in simulated IADL management task to increase independence to  w/ G safety and endurance    8) Pt will demonstrate G carryover of pt/caregiver education and training as appropriate  9) Pt will demonstrate 100% carryover of energy conservation techniques t/o functional I/ADL/leisure tasks w/o cues s/p skilled education    10) Pt will independently identify and utilize 2-3 coping strategies to increase positive affect and promote overall well-being      11) Pt will engage in ongoing cognitive assessment w/ G participation to assist w/ safe d/c planning/recommendations ( as needed)    Ellwood Ormond, MS, OTR/L

## 2023-04-15 ENCOUNTER — APPOINTMENT (INPATIENT)
Dept: RADIOLOGY | Facility: HOSPITAL | Age: 78
End: 2023-04-15

## 2023-04-15 RX ADMIN — GABAPENTIN 100 MG: 100 CAPSULE ORAL at 17:14

## 2023-04-15 RX ADMIN — METHOCARBAMOL 750 MG: 750 TABLET ORAL at 18:09

## 2023-04-15 RX ADMIN — HYDROXYCHLOROQUINE SULFATE 100 MG: 200 TABLET ORAL at 17:14

## 2023-04-15 RX ADMIN — LORAZEPAM 0.5 MG: 0.5 TABLET ORAL at 18:09

## 2023-04-15 RX ADMIN — CEFAZOLIN SODIUM 1000 MG: 1 SOLUTION INTRAVENOUS at 21:13

## 2023-04-15 RX ADMIN — CEFAZOLIN SODIUM 1000 MG: 1 SOLUTION INTRAVENOUS at 12:21

## 2023-04-15 RX ADMIN — CEFAZOLIN SODIUM 1000 MG: 1 SOLUTION INTRAVENOUS at 05:44

## 2023-04-15 RX ADMIN — GABAPENTIN 100 MG: 100 CAPSULE ORAL at 08:26

## 2023-04-15 RX ADMIN — ROPINIROLE 2 MG: 2 TABLET, FILM COATED ORAL at 21:12

## 2023-04-15 RX ADMIN — GABAPENTIN 100 MG: 100 CAPSULE ORAL at 21:12

## 2023-04-15 RX ADMIN — OXYCODONE HYDROCHLORIDE AND ACETAMINOPHEN 500 MG: 500 TABLET ORAL at 08:26

## 2023-04-15 RX ADMIN — MELATONIN 3 MG: at 21:12

## 2023-04-15 RX ADMIN — NORTRIPTYLINE HYDROCHLORIDE 100 MG: 50 CAPSULE ORAL at 21:12

## 2023-04-15 RX ADMIN — LEVOTHYROXINE SODIUM 50 MCG: 50 TABLET ORAL at 05:44

## 2023-04-15 RX ADMIN — ENOXAPARIN SODIUM 40 MG: 40 INJECTION SUBCUTANEOUS at 08:25

## 2023-04-15 RX ADMIN — HYDROMORPHONE HYDROCHLORIDE 0.5 MG: 1 INJECTION, SOLUTION INTRAMUSCULAR; INTRAVENOUS; SUBCUTANEOUS at 14:52

## 2023-04-15 RX ADMIN — CHOLECALCIFEROL TAB 25 MCG (1000 UNIT) 2000 UNITS: 25 TAB at 08:26

## 2023-04-15 RX ADMIN — SENNOSIDES AND DOCUSATE SODIUM 1 TABLET: 8.6; 5 TABLET ORAL at 08:26

## 2023-04-15 RX ADMIN — FOLIC ACID 2 MG: 1 TABLET ORAL at 08:26

## 2023-04-15 RX ADMIN — HYDROXYCHLOROQUINE SULFATE 200 MG: 200 TABLET ORAL at 08:25

## 2023-04-15 RX ADMIN — SENNOSIDES AND DOCUSATE SODIUM 1 TABLET: 8.6; 5 TABLET ORAL at 17:14

## 2023-04-15 NOTE — ASSESSMENT & PLAN NOTE
-No bowel or bladder incontinence  -With associated radiculopathy of the right lower extremity with positive straight leg raise test  -CT A/P showed superior endplate depression of the L5 vertebra and wedge compression deformity, age-indeterminate likely chronic, chronic compression fracture of the L3 vertebra with retropulsion posterior vertebral margin and moderate canal narrowing   -Start Neurontin  - MRI L-spine shows acute to subacute compression fx of L5 and chronic compression fx of L3  -Continue Tylenol as needed, Dilaudid as needed  -Can consider further dosing with Toradol but would rather order 1 dose at a time with risk/benefit analysis of NSAID dosing on each occasion (received Toradol in ER)  -PT/OT- recommending inpt rehab

## 2023-04-15 NOTE — PROGRESS NOTES
[facility]  Progress Note  Name: Sheralyn Osler I  MRN: [id number]  Unit/Bed#: -01 I Date of Admission: 4/13/2023   Date of Service: 4/15/2023 I Hospital Day: 2    Assessment/Plan   Other forms of systemic lupus erythematosus (HCC)  Assessment & Plan  -Continue Plaquenil  -holding MTX with cellulitis    Cellulitis of left lower extremity  Assessment & Plan  -L>R  -In the setting of likely chronic venous stasis dermatitis (patient reports chronic B/L lower extremity erythema)  -cont Ancef   -wound care    Rheumatoid arthritis involving multiple sites with positive rheumatoid factor (HCC)  Assessment & Plan  -Continue Plaquenil  -holding MTX with cellulitis    Hypothyroidism due to Hashimoto's thyroiditis  Assessment & Plan  -cont Levoxyl    * Acute low back pain  Assessment & Plan  -No bowel or bladder incontinence  -With associated radiculopathy of the right lower extremity with positive straight leg raise test  -CT A/P showed superior endplate depression of the L5 vertebra and wedge compression deformity, age-indeterminate likely chronic, chronic compression fracture of the L3 vertebra with retropulsion posterior vertebral margin and moderate canal narrowing   -Start Neurontin  - MRI L-spine shows acute to subacute compression fx of L5 and chronic compression fx of L3  -Continue Tylenol as needed, Dilaudid as needed  -Can consider further dosing with Toradol but would rather order 1 dose at a time with risk/benefit analysis of NSAID dosing on each occasion (received Toradol in ER)  -PT/OT- recommending inpt rehab             VTE Pharmacologic Prophylaxis:   Pharmacologic: Enoxaparin (Lovenox)  Mechanical VTE Prophylaxis in Place: No        Education and Discussions with Family / Patient: call to niece for update-no answer    Time Spent for Care: 30 minutes  More than 50% of total time spent on counseling and coordination of care as described above      Current Length of Stay: 2 day(s)    Current Patient Status: Inpatient   Certification Statement: The patient will continue to require additional inpatient hospital stay due to continue antibiotics, D/C planning    Discharge Plan: therapy recommending inpt rehab    Code Status: Level 1 - Full Code      Subjective:   65year old female admitted with back pain/ambulatory dysfunction  MRI showed acute to subacute compression fx of L5  Therapy recommending inpt rehab but patient insists she wants to go home  Pain controlled in bed but per therapy notes she requires max assist for some ADL's    Objective:     Vitals:   Temp (24hrs), Av 9 °F (36 6 °C), Min:97 8 °F (36 6 °C), Max:98 1 °F (36 7 °C)    Temp:  [97 8 °F (36 6 °C)-98 1 °F (36 7 °C)] 97 8 °F (36 6 °C)  HR:  [76-79] 76  Resp:  [16-20] 16  BP: (135-143)/(77-81) 143/81  SpO2:  [94 %-97 %] 97 %  There is no height or weight on file to calculate BMI  Input and Output Summary (last 24 hours):     No intake or output data in the 24 hours ending 04/15/23 1012    Physical Exam:     Physical Exam  Vitals reviewed  Constitutional:       Appearance: She is not ill-appearing or diaphoretic  Comments: Frail appearing   HENT:      Head: Normocephalic and atraumatic  Nose: Nose normal       Mouth/Throat:      Mouth: Mucous membranes are moist       Pharynx: Oropharynx is clear  Eyes:      Conjunctiva/sclera: Conjunctivae normal    Pulmonary:      Effort: Pulmonary effort is normal  No respiratory distress  Breath sounds: Normal breath sounds  Abdominal:      General: Bowel sounds are normal  There is no distension  Palpations: Abdomen is soft  Tenderness: There is no abdominal tenderness  Musculoskeletal:         General: No signs of injury  Skin:     General: Skin is warm and dry  Comments: Chronic venous stasis discoloration  Erythema improved   Neurological:      Mental Status: She is alert and oriented to person, place, and time     Psychiatric: Mood and Affect: Mood normal          Behavior: Behavior normal            Additional Data:     Labs:    Results from last 7 days   Lab Units 04/13/23  0819   WBC Thousand/uL 4 77   HEMOGLOBIN g/dL 9 5*   HEMATOCRIT % 30 9*   PLATELETS Thousands/uL 267   NEUTROS PCT % 70   LYMPHS PCT % 16   MONOS PCT % 11   EOS PCT % 2     Results from last 7 days   Lab Units 04/13/23  0640   SODIUM mmol/L 138   POTASSIUM mmol/L 3 5   CHLORIDE mmol/L 109*   CO2 mmol/L 25   BUN mg/dL 17   CREATININE mg/dL 0 52*   ANION GAP mmol/L 4   CALCIUM mg/dL 8 8   ALBUMIN g/dL 2 8*   TOTAL BILIRUBIN mg/dL 0 36   ALK PHOS U/L 120*   ALT U/L 23   AST U/L 21   GLUCOSE RANDOM mg/dL 81     Results from last 7 days   Lab Units 04/13/23  0640   INR  0 84             Results from last 7 days   Lab Units 04/13/23  0640   LACTIC ACID mmol/L 1 7   PROCALCITONIN ng/ml 0 12           * I Have Reviewed All Lab Data Listed Above  * Additional Pertinent Lab Tests Reviewed: [place] [address] Admission Reviewed    Imaging:    Imaging Reports Reviewed Today Include: MRI lumbar spine reviewed      Recent Cultures (last 7 days):     Results from last 7 days   Lab Units 04/13/23  0640   BLOOD CULTURE  No Growth at 48 hrs  No Growth at 48 hrs         Last 24 Hours Medication List:   Current Facility-Administered Medications   Medication Dose Route Frequency Provider Last Rate   • acetaminophen  650 mg Oral Q6H PRN Adina Boland MD     • ascorbic acid  500 mg Oral Daily Adina Boland MD     • cefazolin  1,000 mg Intravenous [facility] Adina Boland MD 1,000 mg (04/15/23 0544)   • cholecalciferol  2,000 Units Oral Daily Adina Boland MD     • enoxaparin  40 mg Subcutaneous Daily Adina Boland MD     • folic acid  2 mg Oral Daily Adina Boland MD     • gabapentin  100 mg Oral TID dAina Boland MD     • HYDROmorphone  0 5 mg Intravenous Q6H PRN Adina Boland MD     • hydroxychloroquine  100 mg Oral QPM Adina Boland MD     • hydroxychloroquine 200 mg Oral Daily With Breakfast Artis Medina MD     • levothyroxine  50 mcg Oral Early Morning Artis Medina MD     • LORazepam  0 5 mg Oral Q8H PRN Artis Medina MD     • melatonin  3 mg Oral HS Artis Medina MD     • methocarbamol  750 mg Oral Q6H PRN Artis Medina MD     • nortriptyline  100 mg Oral HS Artis Medina MD     • rOPINIRole  2 mg Oral HS Artis Medina MD     • senna-docusate sodium  1 tablet Oral BID Artis Medina MD          Today, Patient Was Seen By: Sarah Cline PA-C    ** Please Note: Dictation voice to text software may have been used in the creation of this document   **

## 2023-04-15 NOTE — CASE MANAGEMENT
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[facility] ) 10/14/2022   • Non-healing open wound of heel, initial encounter 10/12/2022   • Anemia 2022   • Pressure injury of left foot, stage 4 (HCC)    • Hyponatremia 2022   • Venous insufficiency 2021   • Shoulder pain, bilateral 2021   • Failed orthopedic implant ([facility] ) 2021   • S/P reverse total shoulder arthroplasty, left 2021   • Rupture long head biceps tendon, left, initial encounter 2021   • Shoulder dislocation 2021   • Depression 2020   • Other forms of systemic lupus erythematosus ([facility] ) 2020   • Cellulitis of left lower extremity 2019   • Dyspepsia 2019   • Vitamin D deficiency 2019   • Chronic pain syndrome 2019   • Rheumatoid arthritis involving multiple sites with positive rheumatoid factor ([facility] ) 2019   • Ambulatory dysfunction 2018   • Closed compression fracture of L3 lumbar vertebra 2018   • S/P total hip arthroplasty 2017   • Anxiety 2017   • RLS (restless legs syndrome) 2017   • RBBB 2017   • Age-related osteoporosis without current pathological fracture 2017   • Hypothyroidism due to Hashimoto's thyroiditis 2016      LOS (days): 2  Geometric Mean LOS (GMLOS) (days): 2 80  Days to GMLOS:0 7     OBJECTIVE:    Risk of Unplanned Readmission Score: 18 95         Current admission status: Inpatient       Preferred Pharmacy:   [address] [facility] [address]  Phone: [phone] Fax: [phone]    Primary Care Provider: Emerald Byers MD    Primary Insurance: MEDICARE  Secondary Insurance:     ASSESSMENT:  [place] 26 Proxies    There are no active Health Care Proxies on file  Patient Information  Admitted from[de-identified] Home  Mental Status: Alert  During Assessment patient was accompanied by: Not accompanied during assessment  Assessment information provided by[de-identified] Patient  Primary Caregiver: Family  Caregiver's Relationship to Patient[de-identified] Family Member  Support Systems: Family members, Self  What city do you live in?: [address] [facility] entry access options   Select all that apply : Stairs  Type of Current Residence: 2 story home  Upon entering residence, is there a bedroom on the main floor (no further steps)?: Yes  Upon entering residence, is there a bathroom on the main floor (no further steps)?: Yes  In the last 12 months, was there a time when you were not able to pay the mortgage or rent on time?: No  In the last 12 months, how many places have you lived?: 1  In the last 12 months, was there a time when you did not have a steady place to sleep or slept in a shelter (including now)?: No  Homeless/housing insecurity resource given?: N/A  Living Arrangements: Lives w/ Extended Family  Is patient a ?: No    Activities of Daily Living Prior to Admission  Functional Status: Assistance  Completes ADLs independently?: Yes  Ambulates independently?: Yes  Does patient use assisted devices?: Yes  Assisted Devices (DME) used: [employer], Bedside Commode  Does patient currently own DME?: Yes  What DME does the patient currently own?: Bedside Commode, [employer]  Does patient have a history of Outpatient Therapy (PT/OT)?: No  Does the patient have a history of Short-Term Rehab?: Yes [facility])  Does patient have a history of HHC?: Yes ([facility])  Does patient currently have [address]?: No         Patient Information Continued  Income Source: SSI/SSD  Within the past 12 months, you worried that your food would run out before you got the money to buy more : Never true  Within the past 12 months, the food you bought just didn't last and you didn't have money to get more : Never true  Food insecurity resource given?: N/A  Does patient receive dialysis treatments?: No         Means of Transportation  Means of Transport to Appts[de-identified] Family transport  In the past 12 months, has lack of transportation kept you from medical appointments or from getting medications?: No  In the past 12 months, has lack of transportation kept you from meetings, work, or from getting things needed for daily living?: No  Was application for public transport provided?: N/A    Discharge planning discussed with[de-identified] Pt and saul Martin Para of Choice: Yes  Comments - Freedom of Choice: PT recommending STR - pt refusing, agreeable to [address] only   Hx [facility], agreeable to referral  CM contacted family/caregiver?: Yes Jorge Olivera)  Were Treatment Team discharge recommendations reviewed with patient/caregiver?: Yes  Did patient/caregiver verbalize understanding of patient care needs?: Yes  Were patient/caregiver advised of the risks associated with not following Treatment Team discharge recommendations?: Yes    Contacts  Patient Contacts: Georgina Campos (niece) [phone]  Relationship to Patient[de-identified] Family  Contact Method: Phone  Phone Number: [phone]  Reason/Outcome: Continuity of Care, Discharge [address]         Is the patient interested in [address] at discharge?: Yes  [address] requested[de-identified] Physical Therapy, Nursing, [address] Name[de-identified] [facility] Provider[de-identified] PCP  Home Health Services Needed[de-identified] Evaluate Functional Status and Safety, Gait/ADL Training, Strengthening/Theraputic Exercises to Improve Function  Homebound Criteria Met[de-identified] Requires the Assistance of Another Person for Safe Ambulation or to Leave the Home, Uses an Assist Device (i e  cane, walker, etc)  Supporting Clincal Findings[de-identified] Fatigues Easliy in Short Distances, Dyspnea with Exertion, Limited Endurance    DME Referral Provided  Referral made for DME?: No    Other Referral/Resources/Interventions Provided:  Interventions: HHC  Referral Comments: HHC - referral placed in [address] for [facility]         Treatment Team Recommendation: Short Term Rehab  Discharge Destination Plan[de-identified] Home with [place] at Discharge : Family     Family notified[de-identified] Pt requested that CM make outreach to niece Elinor Craig  CM made outreach to niece, reviewed PT eval and recommendation of STR  Niece agreeable to pt plan for home with C  Additional Comments: Cm reviewed PT eval with pt, not agreeable to STR has had bad experience in past, only agreeable to [facility]  Hx of [facility] and agreeable to referral being sent via [address]  CM to make referral to [facility]

## 2023-04-15 NOTE — PLAN OF CARE
Problem: INFECTION - ADULT  Goal: Absence or prevention of progression during hospitalization  Description: INTERVENTIONS:  - Assess and monitor for signs and symptoms of infection  - Monitor lab/diagnostic results  - Monitor all insertion sites, i e  indwelling lines, tubes, and drains  - Monitor endotracheal if appropriate and nasal secretions for changes in amount and color  - [place] appropriate cooling/warming therapies per order  - Administer medications as ordered  - Instruct and encourage patient and family to use good hand hygiene technique  - Identify and instruct in appropriate isolation precautions for identified infection/condition  Outcome: Progressing

## 2023-04-16 RX ORDER — OXYCODONE HYDROCHLORIDE 5 MG/1
5 TABLET ORAL EVERY 6 HOURS PRN
Status: DISCONTINUED | OUTPATIENT
Start: 2023-04-16 | End: 2023-04-17 | Stop reason: HOSPADM

## 2023-04-16 RX ORDER — LIDOCAINE 50 MG/G
1 PATCH TOPICAL DAILY
Status: DISCONTINUED | OUTPATIENT
Start: 2023-04-16 | End: 2023-04-17 | Stop reason: HOSPADM

## 2023-04-16 RX ORDER — ACETAMINOPHEN 325 MG/1
975 TABLET ORAL EVERY 8 HOURS SCHEDULED
Status: DISCONTINUED | OUTPATIENT
Start: 2023-04-16 | End: 2023-04-17 | Stop reason: HOSPADM

## 2023-04-16 RX ORDER — POLYETHYLENE GLYCOL 3350 17 G/17G
17 POWDER, FOR SOLUTION ORAL DAILY
Status: DISCONTINUED | OUTPATIENT
Start: 2023-04-16 | End: 2023-04-17 | Stop reason: HOSPADM

## 2023-04-16 RX ADMIN — ACETAMINOPHEN 975 MG: 325 TABLET ORAL at 21:43

## 2023-04-16 RX ADMIN — POLYETHYLENE GLYCOL 3350 17 G: 17 POWDER, FOR SOLUTION ORAL at 14:16

## 2023-04-16 RX ADMIN — GABAPENTIN 100 MG: 100 CAPSULE ORAL at 17:29

## 2023-04-16 RX ADMIN — MELATONIN 3 MG: at 21:43

## 2023-04-16 RX ADMIN — LIDOCAINE 1 PATCH: 50 PATCH TOPICAL at 14:16

## 2023-04-16 RX ADMIN — CEFAZOLIN SODIUM 1000 MG: 1 SOLUTION INTRAVENOUS at 21:43

## 2023-04-16 RX ADMIN — HYDROXYCHLOROQUINE SULFATE 100 MG: 200 TABLET ORAL at 20:00

## 2023-04-16 RX ADMIN — METHOCARBAMOL 750 MG: 750 TABLET ORAL at 10:47

## 2023-04-16 RX ADMIN — SENNOSIDES AND DOCUSATE SODIUM 1 TABLET: 8.6; 5 TABLET ORAL at 17:29

## 2023-04-16 RX ADMIN — GABAPENTIN 100 MG: 100 CAPSULE ORAL at 21:43

## 2023-04-16 RX ADMIN — ENOXAPARIN SODIUM 40 MG: 40 INJECTION SUBCUTANEOUS at 08:39

## 2023-04-16 RX ADMIN — HYDROXYCHLOROQUINE SULFATE 200 MG: 200 TABLET ORAL at 08:39

## 2023-04-16 RX ADMIN — ROPINIROLE 2 MG: 2 TABLET, FILM COATED ORAL at 21:44

## 2023-04-16 RX ADMIN — LEVOTHYROXINE SODIUM 50 MCG: 50 TABLET ORAL at 05:20

## 2023-04-16 RX ADMIN — ACETAMINOPHEN 975 MG: 325 TABLET ORAL at 14:16

## 2023-04-16 RX ADMIN — OXYCODONE HYDROCHLORIDE AND ACETAMINOPHEN 500 MG: 500 TABLET ORAL at 08:39

## 2023-04-16 RX ADMIN — CHOLECALCIFEROL TAB 25 MCG (1000 UNIT) 2000 UNITS: 25 TAB at 08:39

## 2023-04-16 RX ADMIN — SENNOSIDES AND DOCUSATE SODIUM 1 TABLET: 8.6; 5 TABLET ORAL at 08:39

## 2023-04-16 RX ADMIN — LORAZEPAM 0.5 MG: 0.5 TABLET ORAL at 09:35

## 2023-04-16 RX ADMIN — GABAPENTIN 100 MG: 100 CAPSULE ORAL at 08:39

## 2023-04-16 RX ADMIN — OXYCODONE HYDROCHLORIDE 5 MG: 5 TABLET ORAL at 17:30

## 2023-04-16 RX ADMIN — FOLIC ACID 2 MG: 1 TABLET ORAL at 08:39

## 2023-04-16 RX ADMIN — METHOCARBAMOL 750 MG: 750 TABLET ORAL at 20:00

## 2023-04-16 RX ADMIN — NORTRIPTYLINE HYDROCHLORIDE 100 MG: 50 CAPSULE ORAL at 21:44

## 2023-04-16 RX ADMIN — CEFAZOLIN SODIUM 1000 MG: 1 SOLUTION INTRAVENOUS at 05:21

## 2023-04-16 RX ADMIN — HYDROMORPHONE HYDROCHLORIDE 0.5 MG: 1 INJECTION, SOLUTION INTRAMUSCULAR; INTRAVENOUS; SUBCUTANEOUS at 09:35

## 2023-04-16 RX ADMIN — CEFAZOLIN SODIUM 1000 MG: 1 SOLUTION INTRAVENOUS at 14:17

## 2023-04-16 NOTE — ASSESSMENT & PLAN NOTE
-No bowel or bladder incontinence  -With associated radiculopathy of the right lower extremity with positive straight leg raise test  -CT A/P showed superior endplate depression of the L5 vertebra and wedge compression deformity, age-indeterminate likely chronic, chronic compression fracture of the L3 vertebra with retropulsion posterior vertebral margin and moderate canal narrowing   -Started Neurontin  - MRI L-spine shows acute to subacute compression fx of L5 and chronic compression fx of L3  -Continue Tylenol OTC, Lidocaine patch, oxy prn  -Can consider further dosing with Toradol but would rather order 1 dose at a time with risk/benefit analysis of NSAID dosing on each occasion (received Toradol in ER)  -PT/OT- recommending inpt rehab but patient adamantly refusing  Spoke with [facility]  Patient lives with her sister and does have home care aide that comes all day Mon-Fri   Because of this, family requesting discharge home on Monday

## 2023-04-16 NOTE — ASSESSMENT & PLAN NOTE
-L>R  -In the setting of likely chronic venous stasis dermatitis (patient reports chronic B/L lower extremity erythema)  -cont Ancef for now   Will likely not need to continue antibiotics for discharge  -wound care

## 2023-04-16 NOTE — DISCHARGE SUMMARY
[facility] [facility]  Discharge- Geetha Colon 1945, 65 y o  female MRN: [id number]  Unit/Bed#: MS [place] Encounter: [id number]  Primary Care Provider: Mary Elise MD   Date and time admitted to hospital: 4/13/2023  5:51 AM    Addendum 1157: Family now states that they cannot care for patient at home  Patient was refusing rehab, but is now agreeable  Discharge cancelled until rehab available  [facility] accepting patient and she will still be discharged today  * Acute low back pain  Assessment & Plan  -No bowel or bladder incontinence  -With associated radiculopathy of the right lower extremity with positive straight leg raise test  -CT A/P showed superior endplate depression of the L5 vertebra and wedge compression deformity, age-indeterminate likely chronic, chronic compression fracture of the L3 vertebra with retropulsion posterior vertebral margin and moderate canal narrowing   -Started Neurontin  - MRI L-spine shows acute to subacute compression fx of L5 and chronic compression fx of L3  -Continue Tylenol OTC, Lidocaine patch, oxy prn  -PT/OT- recommending inpt rehab but patient adamantly refusing  Spoke with saul Vasquez  Patient lives with her sister and does have home care aide that comes all day Mon-Fri  Because of this, family requesting discharge home on Monday  Agreeable to home PT/OT   - Also noted to have Nephrolithiasis with a 3 1cm stone in the right renal pelvis - outpatient Urology evaluation    Cellulitis of left lower extremity  Assessment & Plan  -L>R  -In the setting of likely chronic venous stasis dermatitis (patient reports chronic B/L lower extremity erythema)  -resolved, D/C antibiotics  -wound care    Rheumatoid arthritis involving multiple sites with positive rheumatoid factor (HCC)  Assessment & Plan  -Continue Plaquenil  -holding MTX with recent cellulitis   Plan to resume upon discharge    Other forms of systemic lupus erythematosus [facility])  Assessment & Plan  -Continue Plaquenil  -holding MTX with recent cellulitis, plan to resume upon discharge    Hypothyroidism due to Hashimoto's thyroiditis  Assessment & Plan  -cont Levoxyl    Fracture of L5 vertebrae due to osteoporosis     Medical Problems     Resolved Problems  Date Reviewed: 4/16/2023   None       Discharging Physician / Practitioner: Alicia Duvall PA-C  PCP: Kimberlee Martinez MD  Admission Date:   Admission Orders (From admission, onward)     Ordered        04/13/23 0922  INPATIENT ADMISSION  Once                      Discharge Date: 04/17/23    Consultations During Hospital Stay:  · Wound care    Procedures Performed:     MRI lumbar spine  Acute to early subacute compression fracture of the L5 superior endplate with mild loss of height of the vertebral body  There is associated degenerative disc disease at the L4-5 and L5-S1 levels with mild canal stenosis but no cauda equina or foraminal   nerve impingement      There is a severe chronic L3 compression fracture with bony retropulsion of the posterior inferior margin of the vertebral body into the anterior epidural space resulting in moderate canal stenosis and foraminal narrowing      Small L1-L2 disc herniation without canal stenosis or foraminal narrowing      CT abd/pelvis  Larger right renal pelvis calculus measuring about 3 cm with mild prominence of the right pelvicalyceal system     Additional nonobstructing calculi in the upper and lower pole of the right kidney measuring about 3 mm     Moderate amount of fecal debris in the colon     Superior endplate depression of the L5 vertebra and wedge compression deformity, age-indeterminate likely chronic     Chronic compression fracture of the L3 vertebra with retropulsion posterior vertebral margin and moderate canal narrowing     Nodular contour of the liver, raises concern for cirrhosis  Osteoporosis    Significant Findings / Test Results:   · none    Incidental Findings: · Nephrolithiasis      Test Results Pending at Discharge (will require follow up):   · none     Outpatient Tests Requested:  · none    Complications:  none    Reason for Admission: back pain    Hospital Course:   Mary Gordon is a 65 y o  female patient who originally presented to the hospital on 4/13/2023 due to back pain  Patient started on multimodal pain regimen with some improvement  Patient was started on gabapentin  Will discharge on short course of oxycodone and robaxin prn  Patient declining rehab, but has home health aids and is agreeable to home PT/OT  Patient also initially started on antibiotics for LE cellulitis  This was likely venous stasis changes and appears resolved  Will discontinue antibiotics at discharge  Patient also noted to have a right renal pelvis calculi  Will refer to Urology as outpatient for management  Please see above list of diagnoses and related plan for additional information  Condition at Discharge: good    Discharge Day Visit / Exam:   Subjective:  Still with back pain, but overall better  Vitals: Blood Pressure: 122/65 (04/16/23 2221)  Pulse: 71 (04/16/23 2221)  Temperature: 97 8 °F (36 6 °C) (04/16/23 2221)  Temp Source: Oral (04/16/23 0804)  Respirations: 18 (04/16/23 2221)  SpO2: 97 % (04/16/23 2221)  Exam:   Physical Exam  Constitutional:       Appearance: Normal appearance  Comments: thin   Cardiovascular:      Rate and Rhythm: Normal rate and regular rhythm  Heart sounds: No murmur heard  Pulmonary:      Effort: Pulmonary effort is normal       Breath sounds: Normal breath sounds  Abdominal:      General: Bowel sounds are normal  There is no distension  Palpations: Abdomen is soft  Tenderness: There is no abdominal tenderness  Skin:     General: Skin is warm and dry  Findings: No erythema  Neurological:      General: No focal deficit present  Mental Status: She is alert and oriented to person, place, and time  Psychiatric:         Mood and Affect: Mood normal          Discussion with Family: Updated  (niece) via phone  Discharge instructions/Information to patient and family:   See after visit summary for information provided to patient and family  Provisions for Follow-Up Care:  See after visit summary for information related to follow-up care and any pertinent home health orders  Disposition:   Home with [facility] Services (Reminder: Complete face to face encounter)    Planned Readmission: none     Discharge Statement:  I spent 45 minutes discharging the patient  This time was spent on the day of discharge  I had direct contact with the patient on the day of discharge  Greater than 50% of the total time was spent examining patient, answering all patient questions, arranging and discussing plan of care with patient as well as directly providing post-discharge instructions  Additional time then spent on discharge activities  Discharge Medications:  See after visit summary for reconciled discharge medications provided to patient and/or family        **Please Note: This note may have been constructed using a voice recognition system**

## 2023-04-16 NOTE — ASSESSMENT & PLAN NOTE
-No bowel or bladder incontinence  -With associated radiculopathy of the right lower extremity with positive straight leg raise test  -CT A/P showed superior endplate depression of the L5 vertebra and wedge compression deformity, age-indeterminate likely chronic, chronic compression fracture of the L3 vertebra with retropulsion posterior vertebral margin and moderate canal narrowing   -Start Neurontin  - MRI L-spine shows acute to subacute compression fx of L5 and chronic compression fx of L3  -Continue Tylenol as needed, Dilaudid as needed  -Can consider further dosing with Toradol but would rather order 1 dose at a time with risk/benefit analysis of NSAID dosing on each occasion (received Toradol in ER)  -PT/OT- recommending inpt rehab but patient adamantly refusing  Spoke with niece Kristian Moyer  Patient lives with her sister and does have home care aide that comes all day Mon-Fri   Because of this, family requesting discharge home on Monday

## 2023-04-16 NOTE — PROGRESS NOTES
[facility]  Progress Note  Name: Yanely Horton I  MRN: [id number]  Unit/Bed#: -01 I Date of Admission: 4/13/2023   Date of Service: 4/16/2023 I Hospital Day: 3    Assessment/Plan   Other forms of systemic lupus erythematosus ([facility] [address] )  Assessment & Plan  -Continue Plaquenil  -holding MTX with recent cellulitis, plan to resume upon discharge    Cellulitis of left lower extremity  Assessment & Plan  -L>R  -In the setting of likely chronic venous stasis dermatitis (patient reports chronic B/L lower extremity erythema)  -cont Ancef for now  Will likely not need to continue antibiotics for discharge  -wound care    Rheumatoid arthritis involving multiple sites with positive rheumatoid factor (HCC)  Assessment & Plan  -Continue Plaquenil  -holding MTX with recent cellulitis  Plan to resume upon discharge    Hypothyroidism due to Hashimoto's thyroiditis  Assessment & Plan  -cont Levoxyl    * Acute low back pain  Assessment & Plan  -No bowel or bladder incontinence  -With associated radiculopathy of the right lower extremity with positive straight leg raise test  -CT A/P showed superior endplate depression of the L5 vertebra and wedge compression deformity, age-indeterminate likely chronic, chronic compression fracture of the L3 vertebra with retropulsion posterior vertebral margin and moderate canal narrowing   -Start Neurontin  - MRI L-spine shows acute to subacute compression fx of L5 and chronic compression fx of L3  -Continue Tylenol as needed, Dilaudid as needed  -Can consider further dosing with Toradol but would rather order 1 dose at a time with risk/benefit analysis of NSAID dosing on each occasion (received Toradol in ER)  -PT/OT- recommending inpt rehab but patient adamantly refusing  Spoke with niece Elinor Craig  Patient lives with her sister and does have home care aide that comes all day Mon-Fri   Because of this, family requesting discharge home on Monday              VTE Pharmacologic Prophylaxis:   Pharmacologic: Enoxaparin (Lovenox)  Mechanical VTE Prophylaxis in Place: No    Patient Centered Rounds: I have performed bedside rounds with nursing staff today  Education and Discussions with Family / Patient: spoke with saul Link regarding discharge plan    Time Spent for Care: 30 minutes  More than 50% of total time spent on counseling and coordination of care as described above  Current Length of Stay: 3 day(s)    Current Patient Status: Inpatient   Certification Statement: The patient will continue to require additional inpatient hospital stay due to discharge planning  continue PT/OT    Discharge Plan: home with home care     Code Status: Level 1 - Full Code      Subjective:   65year old female admitted with intractable back pain/ambulatory dysfunction  Patient refusing rehab facility  Per family, she gets home health aide during the day Mon-Fri  Planning discharge home Monday so aide is available  Pain controlled in bed  She has been able to get OOB to chair but mobilization is limited  Otherwise no complaints    Objective:     Vitals:   Temp (24hrs), Av °F (36 7 °C), Min:97 4 °F (36 3 °C), Max:98 4 °F (36 9 °C)    Temp:  [97 4 °F (36 3 °C)-98 4 °F (36 9 °C)] 97 4 °F (36 3 °C)  HR:  [73-82] 73  Resp:  [16-17] 17  BP: (138-159)/(67-82) 141/72  SpO2:  [96 %-99 %] 99 %  There is no height or weight on file to calculate BMI  Input and Output Summary (last 24 hours): Intake/Output Summary (Last 24 hours) at 2023 1047  Last data filed at 2023 0804  Gross per 24 hour   Intake 761 ml   Output 800 ml   Net -39 ml       Physical Exam:     Physical Exam  Vitals reviewed  Constitutional:       General: She is not in acute distress  Appearance: She is not diaphoretic  Comments: Frail appearing   HENT:      Head: Normocephalic and atraumatic  Nose: Nose normal  No congestion  Mouth/Throat:      Pharynx: Oropharynx is clear     Eyes: Conjunctiva/sclera: Conjunctivae normal    Cardiovascular:      Rate and Rhythm: Normal rate  Pulmonary:      Effort: Pulmonary effort is normal  No respiratory distress  Abdominal:      General: Bowel sounds are normal  There is no distension  Palpations: Abdomen is soft  Tenderness: There is no abdominal tenderness  Musculoskeletal:         General: No deformity or signs of injury  Skin:     General: Skin is warm and dry  Findings: No bruising or erythema  Neurological:      Mental Status: She is alert and oriented to person, place, and time  Psychiatric:         Mood and Affect: Mood normal          Behavior: Behavior normal            Additional Data:     Labs:    Results from last 7 days   Lab Units 04/13/23  0819   WBC Thousand/uL 4 77   HEMOGLOBIN g/dL 9 5*   HEMATOCRIT % 30 9*   PLATELETS Thousands/uL 267   NEUTROS PCT % 70   LYMPHS PCT % 16   MONOS PCT % 11   EOS PCT % 2     Results from last 7 days   Lab Units 04/13/23  0640   SODIUM mmol/L 138   POTASSIUM mmol/L 3 5   CHLORIDE mmol/L 109*   CO2 mmol/L 25   BUN mg/dL 17   CREATININE mg/dL 0 52*   ANION GAP mmol/L 4   CALCIUM mg/dL 8 8   ALBUMIN g/dL 2 8*   TOTAL BILIRUBIN mg/dL 0 36   ALK PHOS U/L 120*   ALT U/L 23   AST U/L 21   GLUCOSE RANDOM mg/dL 81     Results from last 7 days   Lab Units 04/13/23  0640   INR  0 84             Results from last 7 days   Lab Units 04/13/23  0640   LACTIC ACID mmol/L 1 7   PROCALCITONIN ng/ml 0 12           * I Have Reviewed All Lab Data Listed Above  * Additional Pertinent Lab Tests Reviewed: [facility]        Recent Cultures (last 7 days):     Results from last 7 days   Lab Units 04/13/23  0640   BLOOD CULTURE  No Growth at 72 hrs  No Growth at 72 hrs         Last 24 Hours Medication List:   Current Facility-Administered Medications   Medication Dose Route Frequency Provider Last Rate   • acetaminophen  650 mg Oral Q6H PRN Tanya Miller MD     • ascorbic acid  500 mg Oral Daily Sean Chavis MD     • cefazolin  1,000 mg Intravenous [facility] Sean Chavis MD 1,000 mg (04/16/23 6068)   • cholecalciferol  2,000 Units Oral Daily Sean Chavis MD     • enoxaparin  40 mg Subcutaneous Daily Sean Chavis MD     • folic acid  2 mg Oral Daily Sean Chavis MD     • gabapentin  100 mg Oral TID Sean Chavis MD     • HYDROmorphone  0 5 mg Intravenous Q6H PRN Sean Chavis MD     • hydroxychloroquine  100 mg Oral QPM Sean Chavis MD     • hydroxychloroquine  200 mg Oral Daily With Breakfast Sean Chavis MD     • levothyroxine  50 mcg Oral Early Morning Sean Chavis MD     • LORazepam  0 5 mg Oral Q8H PRN Sean Chavis MD     • melatonin  3 mg Oral HS Sean Chavis MD     • methocarbamol  750 mg Oral Q6H PRN Sean Chavis MD     • nortriptyline  100 mg Oral HS Sean Chavis MD     • rOPINIRole  2 mg Oral HS Sean Chavis MD     • senna-docusate sodium  1 tablet Oral BID Sean Chavis MD          Today, Patient Was Seen By: Cecy Gongora PA-C    ** Please Note: Dictation voice to text software may have been used in the creation of this document   **

## 2023-04-17 VITALS
SYSTOLIC BLOOD PRESSURE: 122 MMHG | RESPIRATION RATE: 18 BRPM | OXYGEN SATURATION: 97 % | TEMPERATURE: 97.8 F | HEART RATE: 71 BPM | DIASTOLIC BLOOD PRESSURE: 65 MMHG

## 2023-04-17 PROBLEM — R13.10 DYSPHAGIA: Status: RESOLVED | Noted: 2023-02-06 | Resolved: 2023-04-17

## 2023-04-17 PROBLEM — R10.13 DYSPEPSIA: Status: RESOLVED | Noted: 2019-11-12 | Resolved: 2023-04-17

## 2023-04-17 PROBLEM — M79.606 LEG PAIN: Status: RESOLVED | Noted: 2023-02-06 | Resolved: 2023-04-17

## 2023-04-17 PROBLEM — T84.498A FAILED ORTHOPEDIC IMPLANT (HCC): Status: RESOLVED | Noted: 2021-07-27 | Resolved: 2023-04-17

## 2023-04-17 PROBLEM — M25.511 SHOULDER PAIN, BILATERAL: Status: RESOLVED | Noted: 2021-07-27 | Resolved: 2023-04-17

## 2023-04-17 PROBLEM — S32.050D COMPRESSION FRACTURE OF L5 VERTEBRA WITH ROUTINE HEALING: Status: ACTIVE | Noted: 2023-04-17

## 2023-04-17 PROBLEM — M25.512 SHOULDER PAIN, BILATERAL: Status: RESOLVED | Noted: 2021-07-27 | Resolved: 2023-04-17

## 2023-04-17 PROBLEM — E87.1 HYPONATREMIA: Status: RESOLVED | Noted: 2022-07-25 | Resolved: 2023-04-17

## 2023-04-17 PROBLEM — M80.00XD AGE-RELATED OSTEOPOROSIS WITH CURRENT PATHOLOGICAL FRACTURE WITH ROUTINE HEALING: Status: ACTIVE | Noted: 2017-04-18

## 2023-04-17 LAB — SARS-COV-2 RNA RESP QL NAA+PROBE: NEGATIVE

## 2023-04-17 RX ORDER — POLYETHYLENE GLYCOL 3350 17 G/17G
17 POWDER, FOR SOLUTION ORAL DAILY
Refills: 0
Start: 2023-04-18

## 2023-04-17 RX ORDER — OXYCODONE HYDROCHLORIDE 5 MG/1
2.5 TABLET ORAL EVERY 8 HOURS PRN
Qty: 7 TABLET | Refills: 0 | Status: SHIPPED | OUTPATIENT
Start: 2023-04-17 | End: 2023-04-17 | Stop reason: SDUPTHER

## 2023-04-17 RX ORDER — LORAZEPAM 0.5 MG/1
0.5 TABLET ORAL EVERY 8 HOURS PRN
Qty: 30 TABLET | Refills: 0 | Status: ON HOLD | OUTPATIENT
Start: 2023-04-17 | End: 2023-05-04

## 2023-04-17 RX ORDER — SENNOSIDES 8.6 MG
1300 CAPSULE ORAL 3 TIMES DAILY
Qty: 30 TABLET | Refills: 0
Start: 2023-04-17

## 2023-04-17 RX ORDER — LIDOCAINE 40 MG/G
CREAM TOPICAL AS NEEDED
Qty: 30 G | Refills: 0
Start: 2023-04-17

## 2023-04-17 RX ORDER — LORAZEPAM 0.5 MG/1
0.5 TABLET ORAL EVERY 8 HOURS PRN
Qty: 30 TABLET | Refills: 0 | Status: SHIPPED | OUTPATIENT
Start: 2023-04-17 | End: 2023-04-17 | Stop reason: SDUPTHER

## 2023-04-17 RX ORDER — OXYCODONE HYDROCHLORIDE 5 MG/1
2.5 TABLET ORAL EVERY 8 HOURS PRN
Qty: 7 TABLET | Refills: 0 | Status: SHIPPED | OUTPATIENT
Start: 2023-04-17 | End: 2023-04-27

## 2023-04-17 RX ORDER — OXYCODONE HYDROCHLORIDE 5 MG/1
2.5 TABLET ORAL EVERY 8 HOURS PRN
Qty: 7 TABLET | Refills: 0 | Status: ON HOLD | OUTPATIENT
Start: 2023-04-17 | End: 2023-04-17 | Stop reason: SDUPTHER

## 2023-04-17 RX ORDER — GABAPENTIN 100 MG/1
100 CAPSULE ORAL 3 TIMES DAILY
Qty: 90 CAPSULE | Refills: 0 | Status: ON HOLD | OUTPATIENT
Start: 2023-04-17 | End: 2023-05-04

## 2023-04-17 RX ADMIN — FOLIC ACID 2 MG: 1 TABLET ORAL at 08:43

## 2023-04-17 RX ADMIN — LORAZEPAM 0.5 MG: 0.5 TABLET ORAL at 08:44

## 2023-04-17 RX ADMIN — LORAZEPAM 0.5 MG: 0.5 TABLET ORAL at 16:37

## 2023-04-17 RX ADMIN — SENNOSIDES AND DOCUSATE SODIUM 1 TABLET: 8.6; 5 TABLET ORAL at 08:43

## 2023-04-17 RX ADMIN — GABAPENTIN 100 MG: 100 CAPSULE ORAL at 16:37

## 2023-04-17 RX ADMIN — OXYCODONE HYDROCHLORIDE AND ACETAMINOPHEN 500 MG: 500 TABLET ORAL at 08:43

## 2023-04-17 RX ADMIN — ACETAMINOPHEN 975 MG: 325 TABLET ORAL at 05:29

## 2023-04-17 RX ADMIN — CEFAZOLIN SODIUM 1000 MG: 1 SOLUTION INTRAVENOUS at 05:29

## 2023-04-17 RX ADMIN — GABAPENTIN 100 MG: 100 CAPSULE ORAL at 08:43

## 2023-04-17 RX ADMIN — SENNOSIDES AND DOCUSATE SODIUM 1 TABLET: 8.6; 5 TABLET ORAL at 16:38

## 2023-04-17 RX ADMIN — ENOXAPARIN SODIUM 40 MG: 40 INJECTION SUBCUTANEOUS at 08:48

## 2023-04-17 RX ADMIN — Medication 2.5 MG: at 16:38

## 2023-04-17 RX ADMIN — HYDROXYCHLOROQUINE SULFATE 100 MG: 200 TABLET ORAL at 16:39

## 2023-04-17 RX ADMIN — OXYCODONE HYDROCHLORIDE 5 MG: 5 TABLET ORAL at 13:32

## 2023-04-17 RX ADMIN — HYDROXYCHLOROQUINE SULFATE 200 MG: 200 TABLET ORAL at 08:45

## 2023-04-17 RX ADMIN — CHOLECALCIFEROL TAB 25 MCG (1000 UNIT) 2000 UNITS: 25 TAB at 08:43

## 2023-04-17 RX ADMIN — LIDOCAINE 1 PATCH: 50 PATCH TOPICAL at 08:49

## 2023-04-17 RX ADMIN — METHOCARBAMOL 750 MG: 750 TABLET ORAL at 16:38

## 2023-04-17 RX ADMIN — LEVOTHYROXINE SODIUM 50 MCG: 50 TABLET ORAL at 05:29

## 2023-04-17 RX ADMIN — ACETAMINOPHEN 975 MG: 325 TABLET ORAL at 13:32

## 2023-04-17 RX ADMIN — POLYETHYLENE GLYCOL 3350 17 G: 17 POWDER, FOR SOLUTION ORAL at 08:45

## 2023-04-17 NOTE — CASE MANAGEMENT
Case Management Discharge Planning Note    Patient name Elroy Peers  Location /-01 MRN [id number]  : 1945 Date 2023       Current Admission Date: 2023  Current Admission Diagnosis:Acute low back pain   Patient Active Problem List    Diagnosis Date Noted   • Compression fracture of L5 vertebra with routine healing 2023   • Acute low back pain 2023   • Effusion of right shoulder joint 2023   • Arm bruise, right, initial encounter 2023   • Concern for cerebral contusion 2023   • 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 10/14/2022   • Non-healing open wound of heel, initial encounter 10/12/2022   • Anemia 2022   • Pressure injury of left foot, stage 4 ([facility] [address] )    • Venous insufficiency 2021   • S/P reverse total shoulder arthroplasty, left 2021   • Rupture long head biceps tendon, left, initial encounter 2021   • Shoulder dislocation 2021   • Depression 2020   • Other forms of systemic lupus erythematosus ([facility] [address] ) 2020   • Cellulitis of left lower extremity 2019   • Vitamin D deficiency 2019   • Chronic pain syndrome 2019   • Rheumatoid arthritis involving multiple sites with positive rheumatoid factor ([facility] [address] ) 2019   • Ambulatory dysfunction 2018   • Closed compression fracture of L3 lumbar vertebra 2018   • S/P total hip arthroplasty 2017   • Anxiety 2017   • RLS (restless legs syndrome) 2017   • RBBB 2017   • Age-related osteoporosis with current pathological fracture with routine healing 2017   • Hypothyroidism due to Hashimoto's thyroiditis 2016      LOS (days): 4  Geometric Mean LOS (GMLOS) (days): 2 80  Days to GMLOS:-1 5     OBJECTIVE:  Risk of Unplanned Readmission Score: 19 36         Current admission status: Inpatient   Preferred Pharmacy:   [facility] [address] [facility] [place] [address]  Phone: [phone] Fax: [phone]    Primary Care Provider: Reji Pugh MD    Primary Insurance: MEDICARE  Secondary Insurance:     DISCHARGE DETAILS:          Comments - Freedom of Choice: Zhao Moreau came in this morning stating due to the patients debilitated condition she could not take the patient home with her and patient is now agereable to STR  Pt had previously refused STR  Referrasls sent offered choice Matilda Ramsey requested [facility] whom stated the patient must be there by 6pm  W/c [place] transport  Pt aware of the bill    CM contacted family/caregiver?: Yes  Were Treatment Team discharge recommendations reviewed with patient/caregiver?: Yes  Did patient/caregiver verbalize understanding of patient care needs?: N/A- going to facility  Were patient/caregiver advised of the risks associated with not following Treatment Team discharge recommendations?: Yes                   Other Referral/Resources/Interventions Provided:  Interventions: Short Term Rehab         Treatment Team Recommendation: Short Term Rehab     Transport at Discharge : [facility]  Dispatcher Contacted: Yes  Number/Name of Dispatcher: Round [employer]iP  Transported by [employer] and Unit #): [id number]  ETA of Transport (Date): 04/17/23  ETA of Transport (Time): 1285                 IMM Given to[de-identified] Family  Family notified[de-identified] Sierra Sierra Name, Raymond [address] : [address]  Receiving Facility/Agency Phone Number: [phone], fax [phone]

## 2023-04-17 NOTE — PROGRESS NOTES
Pastoral Care Progress Note    2023  Patient: Liz Hilario : 1945  Admission Date & Time: 2023 0551  MRN: [id number] [facility]: [id number]                     Chaplaincy Interventions Utilized:   Empowerment: Encouraged self-care and Reframed experience of patient/family    Exploration: Explored relational needs & resources and Explored spiritual needs & resources    Collaboration: Facilitated respect for spiritual/cultural practice during hospitalization    Relationship Building: Cultivated a relationship of care and support    Ritual: Provided prayer    Chaplaincy Outcomes Achieved:  Expressed gratitude, Expressed peace, Identified meaningful connections, Improved communication, and Verbally processed emotions    Spiritual Coping Strategies Utilized:   Spiritual empowerment, Spiritual gratitude, and No spiritual coping       23 1600   Clinical Encounter Type   Visited With Patient   Routine Visit Introduction   Alevism Encounters   Alevism Needs Prayer

## 2023-04-17 NOTE — PLAN OF CARE
Problem: MOBILITY - ADULT  Goal: Maintain or return to baseline ADL function  Description: INTERVENTIONS:  -  Assess patient's ability to carry out ADLs; assess patient's baseline for ADL function and identify physical deficits which impact ability to perform ADLs (bathing, care of mouth/teeth, toileting, grooming, dressing, etc )  - Assess/evaluate cause of self-care deficits   - Assess range of motion  - Assess patient's mobility; develop plan if impaired  - Assess patient's need for assistive devices and provide as appropriate  - Encourage maximum independence but intervene and supervise when necessary  - Involve family in performance of ADLs  - Assess for home care needs following discharge   - Consider OT consult to assist with ADL evaluation and planning for discharge  - Provide patient education as appropriate  Outcome: Progressing  Goal: Maintains/Returns to pre admission functional level  Description: INTERVENTIONS:  - Perform BMAT or MOVE assessment daily    - Set and communicate daily mobility goal to care team and patient/family/caregiver  - Collaborate with rehabilitation services on mobility goals if consulted  - Perform Range of Motion 3 times a day  - Reposition patient every 3 hours    - Dangle patient 3 times a day  - Stand patient 3 times a day  - Ambulate patient 3 times a day  - Out of bed to chair 3 times a day   - Out of bed for meals 3 times a day  - Out of bed for toileting  - Record patient progress and toleration of activity level   Outcome: Progressing     Problem: Prexisting or High Potential for Compromised Skin Integrity  Goal: Skin integrity is maintained or improved  Description: INTERVENTIONS:  - Identify patients at risk for skin breakdown  - Assess and monitor skin integrity  - Assess and monitor nutrition and hydration status  - Monitor labs   - Assess for incontinence   - Turn and reposition patient  - Assist with mobility/ambulation  - Relieve pressure over bony prominences  - Avoid friction and shearing  - Provide appropriate hygiene as needed including keeping skin clean and dry  - Evaluate need for skin moisturizer/barrier cream  - Collaborate with interdisciplinary team   - Patient/family teaching  - Consider wound care consult   Outcome: Progressing     Problem: PAIN - ADULT  Goal: Verbalizes/displays adequate comfort level or baseline comfort level  Description: Interventions:  - Encourage patient to monitor pain and request assistance  - Assess pain using appropriate pain scale  - Administer analgesics based on type and severity of pain and evaluate response  - Implement non-pharmacological measures as appropriate and evaluate response  - Consider cultural and social influences on pain and pain management  - Notify physician/advanced practitioner if interventions unsuccessful or patient reports new pain  Outcome: Progressing     Problem: INFECTION - ADULT  Goal: Absence or prevention of progression during hospitalization  Description: INTERVENTIONS:  - Assess and monitor for signs and symptoms of infection  - Monitor lab/diagnostic results  - Monitor all insertion sites, i e  indwelling lines, tubes, and drains  - Monitor endotracheal if appropriate and nasal secretions for changes in amount and color  - [place] appropriate cooling/warming therapies per order  - Administer medications as ordered  - Instruct and encourage patient and family to use good hand hygiene technique  - Identify and instruct in appropriate isolation precautions for identified infection/condition  Outcome: Progressing     Problem: SAFETY ADULT  Goal: Maintain or return to baseline ADL function  Description: INTERVENTIONS:  -  Assess patient's ability to carry out ADLs; assess patient's baseline for ADL function and identify physical deficits which impact ability to perform ADLs (bathing, care of mouth/teeth, toileting, grooming, dressing, etc )  - Assess/evaluate cause of self-care deficits - Assess range of motion  - Assess patient's mobility; develop plan if impaired  - Assess patient's need for assistive devices and provide as appropriate  - Encourage maximum independence but intervene and supervise when necessary  - Involve family in performance of ADLs  - Assess for home care needs following discharge   - Consider OT consult to assist with ADL evaluation and planning for discharge  - Provide patient education as appropriate  Outcome: Progressing  Goal: Maintains/Returns to pre admission functional level  Description: INTERVENTIONS:  - Perform BMAT or MOVE assessment daily    - Set and communicate daily mobility goal to care team and patient/family/caregiver  - Collaborate with rehabilitation services on mobility goals if consulted  - Perform Range of Motion 3 times a day  - Reposition patient every 3 hours    - Dangle patient 3 times a day  - Stand patient 3 times a day  - Ambulate patient 3 times a day  - Out of bed to chair 3 times a day   - Out of bed for meals 3 times a day  - Out of bed for toileting  - Record patient progress and toleration of activity level   Outcome: Progressing  Goal: Patient will remain free of falls  Description: INTERVENTIONS:  - Educate patient/family on patient safety including physical limitations  - Instruct patient to call for assistance with activity   - Consult OT/PT to assist with strengthening/mobility   - Keep Call bell within reach  - Keep bed low and locked with side rails adjusted as appropriate  - Keep care items and personal belongings within reach  - Initiate and maintain comfort rounds  - Make Fall Risk Sign visible to staff  - Offer Toileting every 3 Hours, in advance of need  - Initiate/Maintain 3alarm  - Obtain necessary fall risk management equipment: 3  - Apply yellow socks and bracelet for high fall risk patients  - Consider moving patient to room near nurses station  Outcome: Progressing     Problem: DISCHARGE PLANNING  Goal: Discharge to home or other facility with appropriate resources  Description: INTERVENTIONS:  - Identify barriers to discharge w/patient and caregiver  - Arrange for needed discharge resources and transportation as appropriate  - Identify discharge learning needs (meds, wound care, etc )  - Arrange for interpretive services to assist at discharge as needed  - Refer to Case Management Department for coordinating discharge planning if the patient needs post-hospital services based on physician/advanced practitioner order or complex needs related to functional status, cognitive ability, or social support system  Outcome: Progressing     Problem: Knowledge Deficit  Goal: Patient/family/caregiver demonstrates understanding of disease process, treatment plan, medications, and discharge instructions  Description: Complete learning assessment and assess knowledge base    Interventions:  - Provide teaching at level of understanding  - Provide teaching via preferred learning methods  Outcome: Progressing

## 2023-04-18 LAB
BACTERIA BLD CULT: NORMAL
BACTERIA BLD CULT: NORMAL

## 2023-04-18 NOTE — ED ATTENDING ATTESTATION
4/13/2023  Justin FRAZIER MD, saw and evaluated the patient  I have discussed the patient with the resident/non-physician practitioner and agree with the resident's/non-physician practitioner's findings, Plan of Care, and MDM as documented in the resident's/non-physician practitioner's note, except where noted  All available labs and Radiology studies were reviewed  I was present for key portions of any procedure(s) performed by the resident/non-physician practitioner and I was immediately available to provide assistance  At this point I agree with the current assessment done in the Emergency Department  I have conducted an independent evaluation of this patient a history and physical is as follows:    58-year-old female with a history of anxiety, depression, hypothyroidism, GERD and rheumatoid arthritis presents for evaluation due to lower back pain  Patient reports bilateral lower back pain that has been getting progressively worse for approximately 1 week and is now radiating down the right leg  Patient denies any new exercises or heavy lifting  Patient denies any associated saddle anesthesia, fecal or urinary incontinence  Patient denies any associated fever or chills  Patient does report that the pain has been causing her decreased mobility  Patient also endorses pain in the bilateral lower feet more on the left than on the right  Patient states that she has to wrap her left foot in order to be able to walk  Patient does endorse more swelling and erythema in bilateral lower legs  Patient is unsure of the exact onset of the erythema and swelling  Patient denies any other symptoms such as chest pain, shortness of breath, nausea, vomiting or diaphoresis  Patient has been compliant with her medications      /65   Pulse 71   Temp 97 8 °F (36 6 °C)   Resp 18   SpO2 97%   Breastfeeding No   General appearance: alert, cachectic and chronic contractures  Head: Normocephalic, without obvious abnormality, atraumatic  Neck: no adenopathy, no carotid bruit, no JVD, supple, symmetrical, trachea midline and thyroid not enlarged, symmetric, no tenderness/mass/nodules  Back: tenderness to palpation over the lower thoracic and lumbar spine, positive straight leg test on the right, no CVA tenderness  Lungs: clear to auscultation bilaterally  Heart: regular rate and rhythm  Abdomen: soft, non-tender; bowel sounds normal; no masses,  no organomegaly  Extremities: bilateral lower leg edema, warmth and erythema, several open ulcerations, healed ulcer on sole of left foot  Skin: erythema - lower leg(s) bilateral  Neurologic: Grossly normal    18-year-old female with multiple comorbidities presents for evaluation due to lower back pain and bilateral lower extremity edema and erythema  Physical examination concerning for bilateral lower extremity cellulitis  We will start patient on empiric antibiotics with vancomycin and Rocephin  We will obtain a full septic work-up  No back pain red flags however given patient's significant pain we will obtain a CT of the abdomen and pelvis to rule out any additional etiology  We will treat patient with Tylenol, Toradol and lidocaine patch  On reevaluation patient does endorse improvement in her overall pain  Will sign outpatient to oncoming physician pending CT scan, labs and final disposition    ED Course         Critical Care Time  Procedures

## 2023-04-19 PROBLEM — Z79.899 POLYPHARMACY: Status: ACTIVE | Noted: 2023-04-19

## 2023-04-19 PROBLEM — E83.52 HYPERCALCEMIA: Status: ACTIVE | Noted: 2023-04-19

## 2023-04-19 PROBLEM — G93.40 ACUTE ENCEPHALOPATHY: Status: ACTIVE | Noted: 2023-04-19

## 2023-04-21 PROBLEM — L03.116 CELLULITIS OF LEFT LOWER EXTREMITY: Status: RESOLVED | Noted: 2019-11-30 | Resolved: 2023-04-21

## 2023-04-24 DIAGNOSIS — E03.8 HYPOTHYROIDISM DUE TO HASHIMOTO'S THYROIDITIS: ICD-10-CM

## 2023-04-24 DIAGNOSIS — E06.3 HYPOTHYROIDISM DUE TO HASHIMOTO'S THYROIDITIS: ICD-10-CM

## 2023-04-24 DIAGNOSIS — G89.4 CHRONIC PAIN SYNDROME: ICD-10-CM

## 2023-04-24 DIAGNOSIS — G47.09 OTHER INSOMNIA: ICD-10-CM

## 2023-04-24 RX ORDER — NORTRIPTYLINE HYDROCHLORIDE 50 MG/1
100 CAPSULE ORAL
Qty: 180 CAPSULE | Refills: 0 | Status: SHIPPED | OUTPATIENT
Start: 2023-04-24 | End: 2023-05-04 | Stop reason: SDUPTHER

## 2023-04-24 RX ORDER — LEVOTHYROXINE SODIUM 0.05 MG/1
50 TABLET ORAL
Qty: 90 TABLET | Refills: 0 | Status: SHIPPED | OUTPATIENT
Start: 2023-04-24 | End: 2023-05-24

## 2023-04-25 ENCOUNTER — NURSING HOME VISIT (OUTPATIENT)
Dept: GERIATRICS | Facility: OTHER | Age: 78
End: 2023-04-25

## 2023-04-25 DIAGNOSIS — S32.050D COMPRESSION FRACTURE OF L5 VERTEBRA WITH ROUTINE HEALING: Primary | ICD-10-CM

## 2023-04-25 DIAGNOSIS — R63.4 WEIGHT LOSS: ICD-10-CM

## 2023-04-25 DIAGNOSIS — E87.1 HYPONATREMIA: ICD-10-CM

## 2023-04-25 DIAGNOSIS — F32.A DEPRESSION, UNSPECIFIED DEPRESSION TYPE: ICD-10-CM

## 2023-04-25 NOTE — PROGRESS NOTES
[address]  [phone]  [facility] of Service: nursing home place of service: POS 31 Skilled Care-Part A Coverage      NAME: Geetha Rivera  AGE: 65 y o  SEX: female [phone]    DATE OF ENCOUNTER: 4/25/2023    Assessment and Plan     Problem List Items Addressed This Visit        Musculoskeletal and Integument    Compression fracture of L5 vertebra with routine healing - Primary     MRI lumbar spine 4/15 showed acute to early subacute compression fracture of the L5 superior endplate with mild loss of height of the vertebral body   There is associated degenerative disc disease at the L4-5 and L5-S1 levels with mild canal stenosis but no cauda equina or foraminal nerve impingement  There is a severe chronic L3 compression fracture with bony retropulsion of the posterior inferior margin of the vertebral body into the anterior epidural space resulting in moderate canal stenosis and foraminal narrowing   - Continue current pain regimen with Tylenol prn, oxycodone prn  - increase gabapentin to 200 mg 3 times daily  - added oxycodone 5 mg in am, will continue  - Continue working with PT and OT    - Continue to monitor    -Follow-up with neurosurgery            Other    Depression     Taper down nortriptyline  Decreased to 50 mg today  Continue to monitor and provide supportive care  Patient will need neuropsych evaluation for capacity to make medical decisions         Hyponatremia     Sodium 131 today  Will encourage protein supplements  Repeat CMP  Will order sodium studies if sodium trending down         Weight loss     Consult dietitian  Continue to monitor daily weights  Encourage protein supplements                Chief Complaint     Follow up     History of Present Illness     Av Cespedes is a 65 y o  female who was seen today for follow up  Patient seen and examined at bedside  States she feels well    Continues to report pain worse in her lower back  Denies difficulty sleeping  Appetite is preserved  No abdominal pain nausea vomiting constipation dysuria or hematuria          The following portions of the patient's history were reviewed and updated as appropriate: allergies, current medications, past family history, past medical history, past social history, past surgical history and problem list     Review of Systems     Review of Systems   Constitutional: Negative for chills and fever  HENT: Negative for congestion and rhinorrhea  Respiratory: Negative for cough, shortness of breath and wheezing  Cardiovascular: Negative for chest pain, palpitations and leg swelling  Gastrointestinal: Negative for abdominal pain and constipation  Endocrine: Negative for cold intolerance  Genitourinary: Negative for difficulty urinating, dysuria and hematuria  Incontinence   Musculoskeletal: Positive for arthralgias, back pain and gait problem  Skin: Negative for wound  Allergic/Immunologic: Negative for environmental allergies  Neurological: Negative for dizziness and seizures  Hematological: Does not bruise/bleed easily  Psychiatric/Behavioral: Negative for behavioral problems and sleep disturbance         Active Problem List     Patient Active Problem List   Diagnosis   • Anxiety   • RLS (restless legs syndrome)   • S/P total hip arthroplasty   • Hypothyroidism due to Hashimoto's thyroiditis   • Age-related osteoporosis with current pathological fracture with routine healing   • RBBB   • Ambulatory dysfunction   • Closed compression fracture of L3 lumbar vertebra   • Rheumatoid arthritis involving multiple sites with positive rheumatoid factor (HCC)   • Chronic pain syndrome   • Vitamin D deficiency   • Other forms of systemic lupus erythematosus (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[facility])   • Concern for cerebral contusion   • Arm bruise, right, initial encounter   • Effusion of right shoulder joint   • Acute low back pain   • Compression fracture of L5 vertebra with routine healing   • Acute encephalopathy   • Polypharmacy   • Hypercalcemia   • Weight loss       Objective     Vital Signs:     Blood pressure 119/56 Heart Rate: 75 Respiratory Rate 17   Temperature 97 7 Oxygen Saturation 99% Weight 94 1lbs    Physical Exam  Vitals and nursing note reviewed  Constitutional:       General: She is not in acute distress  Appearance: She is not diaphoretic  Comments: Frail looking   HENT:      Head: Normocephalic and atraumatic  Mouth/Throat:      Mouth: Mucous membranes are dry  Eyes:      General:         Right eye: No discharge  Left eye: No discharge  Pupils: Pupils are equal, round, and reactive to light  Cardiovascular:      Rate and Rhythm: Normal rate and regular rhythm  Heart sounds: Normal heart sounds  No murmur heard  Pulmonary:      Effort: Pulmonary effort is normal  No respiratory distress  Breath sounds: Normal breath sounds  No wheezing  Abdominal:      Palpations: Abdomen is soft  Tenderness: There is no abdominal tenderness  There is no guarding or rebound  Musculoskeletal:         General: Tenderness and deformity (R shoulder) present  Cervical back: Normal range of motion and neck supple  Right lower leg: No edema  Left lower leg: No edema  Skin:     General: Skin is warm and dry  Neurological:      Mental Status: She is alert and oriented to person, place, and time  Mental status is at baseline     Psychiatric:         Behavior: Behavior normal          Pertinent Laboratory/Diagnostic Studies:  Laboratory and Imaging studies reviewed  Full report in the paper chart  Current Medications   Medications reviewed and updated in facility chart      Name: Esperanza Najera  : 1945  MRN: [id number]        Juan J Day MD  Geriatric Medicine  2023 1:28 PM

## 2023-04-25 NOTE — ASSESSMENT & PLAN NOTE
MRI lumbar spine 4/15 showed acute to early subacute compression fracture of the L5 superior endplate with mild loss of height of the vertebral body   There is associated degenerative disc disease at the L4-5 and L5-S1 levels with mild canal stenosis but no cauda equina or foraminal nerve impingement   There is a severe chronic L3 compression fracture with bony retropulsion of the posterior inferior margin of the vertebral body into the anterior epidural space resulting in moderate canal stenosis and foraminal narrowing   - Continue current pain regimen with Tylenol prn, oxycodone prn  - increase gabapentin to 200 mg 3 times daily  - added oxycodone 5 mg in am, will continue  - Continue working with PT and OT    - Continue to monitor    -Follow-up with neurosurgery

## 2023-04-25 NOTE — ASSESSMENT & PLAN NOTE
Sodium 131 today  Will encourage protein supplements  Repeat CMP    Will order sodium studies if sodium trending down

## 2023-04-25 NOTE — ASSESSMENT & PLAN NOTE
Taper down nortriptyline  Decreased to 50 mg today  Continue to monitor and provide supportive care  Patient will need neuropsych evaluation for capacity to make medical decisions

## 2023-05-03 ENCOUNTER — NURSING HOME VISIT (OUTPATIENT)
Dept: GERIATRICS | Facility: OTHER | Age: 78
End: 2023-05-03

## 2023-05-03 DIAGNOSIS — M54.41 ACUTE MIDLINE LOW BACK PAIN WITH RIGHT-SIDED SCIATICA: Primary | ICD-10-CM

## 2023-05-03 DIAGNOSIS — R26.2 AMBULATORY DYSFUNCTION: ICD-10-CM

## 2023-05-03 DIAGNOSIS — M05.79 RHEUMATOID ARTHRITIS INVOLVING MULTIPLE SITES WITH POSITIVE RHEUMATOID FACTOR (HCC): Chronic | ICD-10-CM

## 2023-05-03 DIAGNOSIS — D64.9 ANEMIA, UNSPECIFIED TYPE: ICD-10-CM

## 2023-05-03 NOTE — ASSESSMENT & PLAN NOTE
No new or worsening pain  Continue current meds:   * Hydroxychloroquine BID [ 200mg in AM/ 100mg at HS]  * Methortrexate 7 5mg weekly

## 2023-05-03 NOTE — ASSESSMENT & PLAN NOTE
Multifatorial  Chronic anemia (since 2013)  Hbg/Hct: 10 2 / 33 2 (L: 4/27/2023)  On antimetabolites (RA)  Continue Centrum daily/ Folic acid daily

## 2023-05-03 NOTE — ASSESSMENT & PLAN NOTE
Inpatient Imaging (CT / MRI): showed age-indeterminate likely chronic superior endplate depression of the L5 vertebra and wedge compression deformity and chronic compression fracture of the L3 vertebra with retropulsion posterior vertebral margin and moderate canal narrowing,  Acute to subacute compression fx of L5 and chronic compression fx of L3    Patient denies any pain on this visit  Continue pain assessment Q shift while awake  Continue the following meds:  * Gabapentin 200mg TID  * Lidocaine 5% patch to low back daily  * Tylenol 975mg Q8 hours  * Oxycodone 5mg daily  + PRN Oxycodone 2 5mg Q8 hours  * Methocarbamol 500mg Q8 hours PRN  Continue Enoxaparin 40mg daily while in SNF  Continue PT/OT as scheduled

## 2023-05-03 NOTE — PROGRESS NOTES
[address], [address], [facility], [address] [place] [address]  [phone]    NAME: Dominick Rivera  AGE: 65 y o  SEX: female    Progress Note    Location: [facility]  POS: 32 (SNF)    Assessment/Plan:    Acute low back pain  Inpatient Imaging (CT / MRI): showed age-indeterminate likely chronic superior endplate depression of the L5 vertebra and wedge compression deformity and chronic compression fracture of the L3 vertebra with retropulsion posterior vertebral margin and moderate canal narrowing,  Acute to subacute compression fx of L5 and chronic compression fx of L3  Patient denies any pain on this visit  Continue pain assessment Q shift while awake  Continue the following meds:  * Gabapentin 200mg TID  * Lidocaine 5% patch to low back daily  * Tylenol 975mg Q8 hours  * Oxycodone 5mg daily  + PRN Oxycodone 2 5mg Q8 hours  * Methocarbamol 500mg Q8 hours PRN  Continue Enoxaparin 40mg daily while in SNF  Continue PT/OT as scheduled    Rheumatoid arthritis involving multiple sites with positive rheumatoid factor (HCC)  No new or worsening pain  Continue current meds: * Hydroxychloroquine BID [ 200mg in AM/ 100mg at HS]  * Methortrexate 7 5mg weekly    Anemia  Multifatorial  Chronic anemia (since 2013)  Hbg/Hct: 10 2 / 33 2 (L: 4/27/2023)  On antimetabolites (RA)  Continue Centrum daily/ Folic acid daily    Ambulatory dysfunction  Continue PT/OT as scheduled  Chief complaint / Reason for visit: Follow- visit    History of Present Illness: This is a 65 y o  Female patient currently admitted at [facility] for rehabilitation services following discharge from acute care hospitalization ([facility]: 4/13-17/2023) with Dx of Acute Low Back Pain, Cellulitis of LLE  Patient is OOB for this visit - alert, cooperative, calm, very pleasant and not in distress  Patient is verbally engaged with clear coherent speech - oriented to name/ birthday and place   Patient acknowledged feeling well on this visit - denies any acute medical concerns during ROS assessment including pain  Per nursing, no acute medical concerns with this visit as well  Nursing and prior provider notes reviewed on this visit  Discussed visit with PCP and nursing staff/ supervisor  Review of Systems:  Per history of present illness, all other systems reviewed and negative  HISTORY:  Medical Hx: Reviewed, unchanged  Family Hx: Reviewed, unchanged  Soc Hx: Reviewed,  unchanged    ALLERGY: Reviewed, unchanged  No Known Allergies     PHYSICAL EXAM:  Vital Signs: T97 1F -P65 -R16 BP: 133/62 SpO2: 99% RA  Weight: 91 8 lbs (5/1/2023) <= 100 0 lbs (4/17/2023)    General: NAD  Well appearing  No acute distress  Frail stature  Head: Atraumatic  Normocephalic  Eye Exam: anicteric sclera, no discharge, PERRLA, No injection  Oral Exam: moist mucous membranes, no buccaloropharyngeal erythema, palatine tonsils WNL  Neck Exam: no anterior cervical lymphadenopathy noted, neck supple  Trachea midline, no carotid bruit, no masses  Cardiovascular: regular rate, regular rhythm, no murmurs, rubs, or gallops  S1 and S2  Pulmonary: no wheeze, no rhonchi, no rales  No chest tenderness  Normal chest wall expansion  Abdominal: soft, non-tender, nondistended, bowel sounds audible x 4 quadrants  No palpable hepatosplenomegaly, no tympany  : Non distended bladder  Continent  Extremities and skin: no edema noted, no rashes  Intact skin  Severely limited ROM to B/L UE  Neurological: alert, cooperative and responsive, Oriented x 3   Cranial Nerves II-XII grossly intact, no tics, normal sensation to pressure and light touch  moving all 4 extremities symmetrically     Laboratory results / Imaging reviewed: Hard copy/ies in medical chart:    * CMP (4/27/2023) = WNL except:  Na: 132 (L)/ Crea: 0 46 (L)/ Alk Phosph: 109 (H)/ TPro: 6 3 (L)/ Alb: 3 2 (L)    * CBC wo diff (4/27/2023) = WNL    Latest Reference Range & Units 04/27/23 06:46   WBC 4 31 - 10 16 Thousand/uL 5 75   Red Blood Cell "Count 3 81 - 5 12 Million/uL 3 78 (L)   Hemoglobin 11 5 - 15 4 g/dL 10 2 (L)   HCT 34 8 - 46 1 % 33 2 (L)   MCV 82 - 98 fL 88   MCH 26 8 - 34 3 pg 27 0   MCHC 31 4 - 37 4 g/dL 30 7 (L)   RDW 11 6 - 15 1 % 16 7 (H)   Platelet Count 140 - 390 Thousands/uL 391 (H)   MPV 8 9 - 12 7 fL 8 6 (L)         Current Medications: All medications reviewed and updated in Nursing Home Chart    Please note: This note was completed in part utilizing a voice-recognition software may have been used in the preparation of this document  Grammatical errors, random word insertion, spelling mistakes, and incomplete sentences may be an occasional consequence of the system secondary to software limitations, ambient noise and hardware issues  Occasional wrong word or \"sound-alike\" substitutions may have occurred due to the inherent limitations of voice recognition software  At the time of dictation, efforts were made to edit, clarify and/or correct errors  Interpretation should be guided by context  Please read the chart carefully and recognize, using context, where substitutions have occurred  If you have any questions or concerns about the context, text or information contained within the body of this dictation, please contact myself, the provider, for further clarification        CONNIE Wilde  5/3/2023    "

## 2023-05-04 ENCOUNTER — NURSING HOME VISIT (OUTPATIENT)
Dept: GERIATRICS | Facility: OTHER | Age: 78
End: 2023-05-04

## 2023-05-04 ENCOUNTER — HOME HEALTH ADMISSION (OUTPATIENT)
Dept: HOME HEALTH SERVICES | Facility: HOME HEALTHCARE | Age: 78
End: 2023-05-04
Payer: MEDICARE

## 2023-05-04 ENCOUNTER — TRANSCRIBE ORDERS (OUTPATIENT)
Dept: HOME HEALTH SERVICES | Facility: HOME HEALTHCARE | Age: 78
End: 2023-05-04

## 2023-05-04 DIAGNOSIS — E46 PROTEIN-CALORIE MALNUTRITION, UNSPECIFIED SEVERITY (HCC): ICD-10-CM

## 2023-05-04 DIAGNOSIS — G47.09 OTHER INSOMNIA: ICD-10-CM

## 2023-05-04 DIAGNOSIS — S32.030D CLOSED COMPRESSION FRACTURE OF L3 LUMBAR VERTEBRA WITH ROUTINE HEALING, SUBSEQUENT ENCOUNTER: Primary | ICD-10-CM

## 2023-05-04 DIAGNOSIS — M54.41 ACUTE MIDLINE LOW BACK PAIN WITH RIGHT-SIDED SCIATICA: ICD-10-CM

## 2023-05-04 DIAGNOSIS — M05.79 RHEUMATOID ARTHRITIS INVOLVING MULTIPLE SITES WITH POSITIVE RHEUMATOID FACTOR (HCC): Chronic | ICD-10-CM

## 2023-05-04 DIAGNOSIS — F32.A DEPRESSION, UNSPECIFIED DEPRESSION TYPE: ICD-10-CM

## 2023-05-04 DIAGNOSIS — R26.2 AMBULATORY DYSFUNCTION: ICD-10-CM

## 2023-05-04 DIAGNOSIS — S43.006S DISLOCATION OF SHOULDER REGION, UNSPECIFIED LATERALITY, SEQUELA: ICD-10-CM

## 2023-05-04 DIAGNOSIS — G93.40 ACUTE ENCEPHALOPATHY: ICD-10-CM

## 2023-05-04 DIAGNOSIS — S32.050D COMPRESSION FRACTURE OF L5 VERTEBRA WITH ROUTINE HEALING: Primary | ICD-10-CM

## 2023-05-04 RX ORDER — GABAPENTIN 100 MG/1
200 CAPSULE ORAL 3 TIMES DAILY
Qty: 90 CAPSULE | Refills: 0
Start: 2023-05-04

## 2023-05-04 RX ORDER — METHOCARBAMOL 500 MG/1
500 TABLET, FILM COATED ORAL 3 TIMES DAILY PRN
COMMUNITY

## 2023-05-04 RX ORDER — NORTRIPTYLINE HYDROCHLORIDE 50 MG/1
50 CAPSULE ORAL
Qty: 30 CAPSULE | Refills: 0 | Status: SHIPPED | OUTPATIENT
Start: 2023-05-04 | End: 2023-06-03

## 2023-05-04 RX ORDER — LANOLIN ALCOHOL/MO/W.PET/CERES
3 CREAM (GRAM) TOPICAL
Qty: 30 TABLET | Refills: 0 | Status: SHIPPED | OUTPATIENT
Start: 2023-05-04

## 2023-05-04 NOTE — ASSESSMENT & PLAN NOTE
Patient lost about 5 pounds during her stay in the facility  Encouraged to continue protein supplements as outpatient  Monitor weights  Follow-up with PCP

## 2023-05-04 NOTE — ASSESSMENT & PLAN NOTE
Inpatient Imaging (CT / MRI): showed age-indeterminate likely chronic superior endplate depression of the L5 vertebra and wedge compression deformity and chronic compression fracture of the L3 vertebra with retropulsion posterior vertebral margin and moderate canal narrowing,  Acute to subacute compression fx of L5 and chronic compression fx of L3    Patient denies any pain on this visit  Continue pain assessment Q shift while awake  Continue the following meds:  * Gabapentin 200mg TID  * Lidocaine 5% patch to low back daily  * Tylenol 975mg Q8 hours  * Oxycodone 5mg daily  + PRN Oxycodone 2 5mg Q8 hours, taper off as tolerated  * Methocarbamol 500mg Q8 hours PRN  Continue PT/OT at home

## 2023-05-04 NOTE — PROGRESS NOTES
[address]: [facility]  POS: 31: SNF/Short Term Rehab    NAME: Geetha Rivera  AGE: 65 y o  SEX: female  DATE OF ADMISSION: 4/17/23   DATE OF DISCHARGE:5/4/23   DISCHARGE DISPOSITION: home  Today's Visit: 5/4/202311:50 AM    Reason for admission: Patient was admitted from [address] [place] [address] for rehabilitation after hospitalization for vertebral compression fracture  Course of stay: Patient was admitted to  [address] [place] [address] for rehabilitation due to  physical deconditioning and L5 compression fracture  Significant events during the stay include acute encephalopathy  The patient participated in PT/OT  Assessment/Plan:    Compression fracture of L5 vertebra with routine healing  MRI lumbar spine 4/15 showed acute to early subacute compression fracture of the L5 superior endplate with mild loss of height of the vertebral body   There is associated degenerative disc disease at the L4-5 and L5-S1 levels with mild canal stenosis but no cauda equina or foraminal nerve impingement  There is a severe chronic L3 compression fracture with bony retropulsion of the posterior inferior margin of the vertebral body into the anterior epidural space resulting in moderate canal stenosis and foraminal narrowing   - Continue current pain regimen with Tylenol prn, oxycodone prn  - continue gabapentin to 200 mg 3 times daily  - continue oxycodone 5 mg in am, will continue  - Continue home PT and OT    - Continue to monitor    -Follow-up with neurosurgery    Acute encephalopathy  Resolved  Follow-up with PCP and psychiatry  Advise geriatric assessment as outpatient    Rheumatoid arthritis involving multiple sites with positive rheumatoid factor ([facility] [address] )  No new or worsening pain  Continue current meds:   * Hydroxychloroquine BID [ 200mg in AM/ 100mg at HS]  * Methortrexate 7 5mg weekly    Shoulder dislocation  Chronic shoulder dislocation  Per Ortho no surgical intervention  Continue OT    Depression  Nortriptyline was decreased to 50 mg daily  Consider tapering off as outpatient, find a safer alternative for patient age and comorbidities  Follow-up with psychiatry as outpatient  Patient did not use lorazepam for the past few days, will discontinue at discharge  Avoid use of benzodiazepines in the future    Acute low back pain  Inpatient Imaging (CT / MRI): showed age-indeterminate likely chronic superior endplate depression of the L5 vertebra and wedge compression deformity and chronic compression fracture of the L3 vertebra with retropulsion posterior vertebral margin and moderate canal narrowing,  Acute to subacute compression fx of L5 and chronic compression fx of L3  Patient denies any pain on this visit  Continue pain assessment Q shift while awake  Continue the following meds:  * Gabapentin 200mg TID  * Lidocaine 5% patch to low back daily  * Tylenol 975mg Q8 hours  * Oxycodone 5mg daily  + PRN Oxycodone 2 5mg Q8 hours, taper off as tolerated  * Methocarbamol 500mg Q8 hours PRN  Continue PT/OT at home    Protein calorie malnutrition ([facility] [address] )  Patient lost about 5 pounds during her stay in the facility  Encouraged to continue protein supplements as outpatient  Monitor weights  Follow-up with PCP    Ambulatory dysfunction  Continue home PT OT  Discussed about fall precautions         Discharge Medications: See discharge medication list which was reviewed and signed  Status at time of discharge: Stable    Subjective: Patient seen and examined at bedside, denies any acute complaints at the time of encounter  States she feels well and she feels ready to return home  Review of Systems   Constitutional: Negative for chills and fever  HENT: Negative for congestion and rhinorrhea  Respiratory: Negative for cough, shortness of breath and wheezing      Cardiovascular: Negative for chest pain, palpitations and leg swelling  Gastrointestinal: Negative for abdominal pain and constipation  Endocrine: Negative for cold intolerance  Genitourinary: Negative for difficulty urinating, dysuria and hematuria  Musculoskeletal: Positive for arthralgias and gait problem  Skin: Negative for wound  Allergic/Immunologic: Negative for environmental allergies  Neurological: Negative for dizziness and seizures  Hematological: Does not bruise/bleed easily  Psychiatric/Behavioral: Negative for behavioral problems and sleep disturbance  Vital Signs:     Blood pressure 117/58 Heart Rate: 73 Respiratory Rate 18   Temperature 97 2 Oxygen Saturation 92% Weight 91 8lbs    Exam:     Physical Exam  Vitals and nursing note reviewed  Constitutional:       General: She is not in acute distress  Appearance: She is not diaphoretic  Comments: Frail looking   HENT:      Head: Normocephalic and atraumatic  Mouth/Throat:      Mouth: Mucous membranes are moist    Eyes:      General:         Right eye: No discharge  Left eye: No discharge  Pupils: Pupils are equal, round, and reactive to light  Cardiovascular:      Rate and Rhythm: Normal rate and regular rhythm  Heart sounds: Normal heart sounds  No murmur heard  Pulmonary:      Effort: Pulmonary effort is normal  No respiratory distress  Breath sounds: Normal breath sounds  No wheezing  Abdominal:      Palpations: Abdomen is soft  Tenderness: There is no abdominal tenderness  There is no guarding or rebound  Musculoskeletal:         General: Tenderness and deformity (R shoulder) present  Cervical back: Normal range of motion and neck supple  Right lower leg: No edema  Left lower leg: No edema  Skin:     General: Skin is warm and dry  Neurological:      Mental Status: She is alert and oriented to person, place, and time  Mental status is at baseline     Psychiatric:         Behavior: Behavior normal  Discussion with patient/family and further instructions:  -Fall precautions  -Aspiration precautions  -Bleeding precautions  -Monitor for signs/symptoms of infection  -Medication list was reviewed and signed  -DME form was completed    Follow-up Recommendations: Please follow-up with your primary care physician within 7-10 days of discharge to review medication changes and current status       Problem List Follow-up Recommendations:  Continue home PT  Follow-up with orthopedic surgery , neurosurgery, psychiatry    Allan Matthews MD  Geriatric Medicine  0/2/136925:80 AM

## 2023-05-04 NOTE — ASSESSMENT & PLAN NOTE
MRI lumbar spine 4/15 showed acute to early subacute compression fracture of the L5 superior endplate with mild loss of height of the vertebral body   There is associated degenerative disc disease at the L4-5 and L5-S1 levels with mild canal stenosis but no cauda equina or foraminal nerve impingement   There is a severe chronic L3 compression fracture with bony retropulsion of the posterior inferior margin of the vertebral body into the anterior epidural space resulting in moderate canal stenosis and foraminal narrowing   - Continue current pain regimen with Tylenol prn, oxycodone prn  - continue gabapentin to 200 mg 3 times daily  - continue oxycodone 5 mg in am, will continue  - Continue home PT and OT    - Continue to monitor    -Follow-up with neurosurgery

## 2023-05-04 NOTE — ASSESSMENT & PLAN NOTE
Nortriptyline was decreased to 50 mg daily  Consider tapering off as outpatient, find a safer alternative for patient age and comorbidities  Follow-up with psychiatry as outpatient  Patient did not use lorazepam for the past few days, will discontinue at discharge    Avoid use of benzodiazepines in the future

## 2023-05-05 ENCOUNTER — HOME CARE VISIT (OUTPATIENT)
Dept: HOME HEALTH SERVICES | Facility: HOME HEALTHCARE | Age: 78
End: 2023-05-05

## 2023-05-06 ENCOUNTER — HOME CARE VISIT (OUTPATIENT)
Dept: HOME HEALTH SERVICES | Facility: HOME HEALTHCARE | Age: 78
End: 2023-05-06

## 2023-05-06 VITALS
TEMPERATURE: 97.6 F | OXYGEN SATURATION: 98 % | SYSTOLIC BLOOD PRESSURE: 110 MMHG | RESPIRATION RATE: 16 BRPM | HEART RATE: 76 BPM | DIASTOLIC BLOOD PRESSURE: 70 MMHG

## 2023-05-06 PROCEDURE — 10330081 VN NO-PAY CLAIM PROCEDURE

## 2023-05-06 NOTE — CASE COMMUNICATION
"[facility] admitted your patient to [facility] service with the following disciplines:      SN, PT, OT and MSW  This report is informational only, no responses is needed  Primary focus of home health care: L5 compression fracture  Patient stated goals of care: \"to be able to tend to myself\"  Anticipated visit pattern and next visit date: 2w2, 1w7, next anticipated visit 5/9/23    See medication list - meds in home differ from A VS:  Pt does not have oxycodone or lorazepam in home, Pt reports her niece gives them to her as needed but does not leave bottles in home dt pt forgetfulness  Pt needs to  cholecalciferol  Significant clinical findings: A&Ox3, forgetful  Lungs clear  HRR murmur noted  VSS  +1 BLLE  Skin intact  Pt reports difficulty getting to MD appts dt granddaughter work schedule  Pt lives at home with her elderly sister  Pt reports her Dino Lombardi offers assistance occasionally  Meghana Alvarez aware of SN visit but unable to be present for visit  Pt reports she has a HHA but does not know when they come or what company they come from  MSW consult placed to assess community resource needs  Potential barriers to goal achievement: limited assistance, impaired functional mobility  hx falls      Thank you for allowing us to participate in the care of your patient        Monico Counts ris RN Haze Osler STEPHANIE      "

## 2023-05-09 ENCOUNTER — HOME CARE VISIT (OUTPATIENT)
Dept: HOME HEALTH SERVICES | Facility: HOME HEALTHCARE | Age: 78
End: 2023-05-09

## 2023-05-09 VITALS
SYSTOLIC BLOOD PRESSURE: 118 MMHG | HEART RATE: 80 BPM | TEMPERATURE: 96.5 F | DIASTOLIC BLOOD PRESSURE: 62 MMHG | OXYGEN SATURATION: 98 % | RESPIRATION RATE: 16 BRPM

## 2023-05-09 VITALS — SYSTOLIC BLOOD PRESSURE: 116 MMHG | OXYGEN SATURATION: 95 % | DIASTOLIC BLOOD PRESSURE: 68 MMHG | HEART RATE: 85 BPM

## 2023-05-11 ENCOUNTER — HOME CARE VISIT (OUTPATIENT)
Dept: HOME HEALTH SERVICES | Facility: HOME HEALTHCARE | Age: 78
End: 2023-05-11

## 2023-05-11 ENCOUNTER — RA CDI HCC (OUTPATIENT)
Dept: OTHER | Facility: HOSPITAL | Age: 78
End: 2023-05-11

## 2023-05-11 VITALS
SYSTOLIC BLOOD PRESSURE: 110 MMHG | HEART RATE: 72 BPM | DIASTOLIC BLOOD PRESSURE: 74 MMHG | OXYGEN SATURATION: 98 % | RESPIRATION RATE: 20 BRPM

## 2023-05-11 NOTE — CASE COMMUNICATION
Informational only   no response required    PT evaluation completed this date  POC 1x1wk 2 x3wks to improve safe mobility in home and exiting home with appropriate AD  Instruction and progression of hep for strengthening core and les  gait activities to improve endurance and gait pattern

## 2023-05-11 NOTE — PROGRESS NOTES
[facility]  coding opportunities     M32 [id number]     Chart Reviewed number of suggestions sent to Provider: 1     Patients Insurance     Medicare Insurance: [employer]

## 2023-05-12 ENCOUNTER — HOME CARE VISIT (OUTPATIENT)
Dept: HOME HEALTH SERVICES | Facility: HOME HEALTHCARE | Age: 78
End: 2023-05-12

## 2023-05-12 VITALS
SYSTOLIC BLOOD PRESSURE: 128 MMHG | TEMPERATURE: 97.8 F | RESPIRATION RATE: 16 BRPM | DIASTOLIC BLOOD PRESSURE: 60 MMHG | OXYGEN SATURATION: 98 % | HEART RATE: 80 BPM

## 2023-05-12 VITALS — HEART RATE: 83 BPM | SYSTOLIC BLOOD PRESSURE: 141 MMHG | OXYGEN SATURATION: 98 % | DIASTOLIC BLOOD PRESSURE: 79 MMHG

## 2023-05-15 ENCOUNTER — HOME CARE VISIT (OUTPATIENT)
Dept: HOME HEALTH SERVICES | Facility: HOME HEALTHCARE | Age: 78
End: 2023-05-15

## 2023-05-15 VITALS
RESPIRATION RATE: 18 BRPM | SYSTOLIC BLOOD PRESSURE: 124 MMHG | DIASTOLIC BLOOD PRESSURE: 62 MMHG | TEMPERATURE: 98.3 F | OXYGEN SATURATION: 97 % | HEART RATE: 76 BPM

## 2023-05-16 ENCOUNTER — HOME CARE VISIT (OUTPATIENT)
Dept: HOME HEALTH SERVICES | Facility: HOME HEALTHCARE | Age: 78
End: 2023-05-16

## 2023-05-16 VITALS — OXYGEN SATURATION: 99 % | DIASTOLIC BLOOD PRESSURE: 72 MMHG | HEART RATE: 80 BPM | SYSTOLIC BLOOD PRESSURE: 124 MMHG

## 2023-05-17 ENCOUNTER — TELEPHONE (OUTPATIENT)
Dept: FAMILY MEDICINE CLINIC | Facility: CLINIC | Age: 78
End: 2023-05-17

## 2023-05-17 ENCOUNTER — OFFICE VISIT (OUTPATIENT)
Dept: FAMILY MEDICINE CLINIC | Facility: CLINIC | Age: 78
End: 2023-05-17

## 2023-05-17 ENCOUNTER — HOME CARE VISIT (OUTPATIENT)
Dept: HOME HEALTH SERVICES | Facility: HOME HEALTHCARE | Age: 78
End: 2023-05-17

## 2023-05-17 VITALS
TEMPERATURE: 98.9 F | OXYGEN SATURATION: 99 % | HEIGHT: 57 IN | SYSTOLIC BLOOD PRESSURE: 122 MMHG | DIASTOLIC BLOOD PRESSURE: 70 MMHG | WEIGHT: 96.7 LBS | HEART RATE: 75 BPM | BODY MASS INDEX: 20.86 KG/M2 | RESPIRATION RATE: 16 BRPM

## 2023-05-17 VITALS
RESPIRATION RATE: 20 BRPM | OXYGEN SATURATION: 98 % | DIASTOLIC BLOOD PRESSURE: 70 MMHG | SYSTOLIC BLOOD PRESSURE: 120 MMHG | HEART RATE: 72 BPM

## 2023-05-17 DIAGNOSIS — E11.621 DIABETIC ULCER OF LEFT MIDFOOT ASSOCIATED WITH TYPE 2 DIABETES MELLITUS, WITH BONE INVOLVEMENT WITHOUT EVIDENCE OF NECROSIS (HCC): ICD-10-CM

## 2023-05-17 DIAGNOSIS — G93.40 ACUTE ENCEPHALOPATHY: ICD-10-CM

## 2023-05-17 DIAGNOSIS — E06.3 HYPOTHYROIDISM DUE TO HASHIMOTO'S THYROIDITIS: ICD-10-CM

## 2023-05-17 DIAGNOSIS — Z00.00 ENCOUNTER FOR ANNUAL WELLNESS VISIT (AWV) IN MEDICARE PATIENT: Primary | ICD-10-CM

## 2023-05-17 DIAGNOSIS — Z79.899 IMMUNODEFICIENCY DUE TO DRUGS (HCC): ICD-10-CM

## 2023-05-17 DIAGNOSIS — M54.41 ACUTE MIDLINE LOW BACK PAIN WITH RIGHT-SIDED SCIATICA: ICD-10-CM

## 2023-05-17 DIAGNOSIS — L97.426 DIABETIC ULCER OF LEFT MIDFOOT ASSOCIATED WITH TYPE 2 DIABETES MELLITUS, WITH BONE INVOLVEMENT WITHOUT EVIDENCE OF NECROSIS (HCC): ICD-10-CM

## 2023-05-17 DIAGNOSIS — Z71.89 ADVANCED DIRECTIVES, COUNSELING/DISCUSSION: ICD-10-CM

## 2023-05-17 DIAGNOSIS — E46 PROTEIN-CALORIE MALNUTRITION, UNSPECIFIED SEVERITY (HCC): ICD-10-CM

## 2023-05-17 DIAGNOSIS — D84.821 IMMUNODEFICIENCY DUE TO DRUGS (HCC): ICD-10-CM

## 2023-05-17 DIAGNOSIS — I87.312 CHRONIC VENOUS HYPERTENSION (IDIOPATHIC) WITH ULCER OF LEFT LOWER EXTREMITY (CODE) (HCC): ICD-10-CM

## 2023-05-17 DIAGNOSIS — E32.8: ICD-10-CM

## 2023-05-17 DIAGNOSIS — G89.4 CHRONIC PAIN SYNDROME: ICD-10-CM

## 2023-05-17 DIAGNOSIS — E03.8 HYPOTHYROIDISM DUE TO HASHIMOTO'S THYROIDITIS: ICD-10-CM

## 2023-05-17 DIAGNOSIS — M05.79 RHEUMATOID ARTHRITIS INVOLVING MULTIPLE SITES WITH POSITIVE RHEUMATOID FACTOR (HCC): Chronic | ICD-10-CM

## 2023-05-17 DIAGNOSIS — M80.00XD AGE-RELATED OSTEOPOROSIS WITH CURRENT PATHOLOGICAL FRACTURE WITH ROUTINE HEALING: ICD-10-CM

## 2023-05-17 DIAGNOSIS — S32.030D CLOSED COMPRESSION FRACTURE OF L3 LUMBAR VERTEBRA WITH ROUTINE HEALING, SUBSEQUENT ENCOUNTER: ICD-10-CM

## 2023-05-17 DIAGNOSIS — G47.09 OTHER INSOMNIA: ICD-10-CM

## 2023-05-17 RX ORDER — LEVOTHYROXINE SODIUM 0.07 MG/1
75 TABLET ORAL
Qty: 90 TABLET | Refills: 1 | Status: SHIPPED | OUTPATIENT
Start: 2023-05-17 | End: 2023-08-15

## 2023-05-17 RX ORDER — NORTRIPTYLINE HYDROCHLORIDE 50 MG/1
100 CAPSULE ORAL
Qty: 60 CAPSULE | Refills: 2 | Status: SHIPPED | OUTPATIENT
Start: 2023-05-17 | End: 2023-06-16

## 2023-05-17 RX ORDER — METHOCARBAMOL 500 MG/1
500 TABLET, FILM COATED ORAL DAILY PRN
Qty: 30 TABLET | Refills: 3 | Status: SHIPPED | OUTPATIENT
Start: 2023-05-17

## 2023-05-17 RX ORDER — GABAPENTIN 100 MG/1
200 CAPSULE ORAL 3 TIMES DAILY
Qty: 90 CAPSULE | Refills: 0 | Status: SHIPPED | OUTPATIENT
Start: 2023-05-17

## 2023-05-17 RX ORDER — HYDROXYCHLOROQUINE SULFATE 200 MG/1
200 TABLET, FILM COATED ORAL 2 TIMES DAILY WITH MEALS
Qty: 180 TABLET | Refills: 1 | Status: SHIPPED | OUTPATIENT
Start: 2023-05-17 | End: 2023-05-19 | Stop reason: SDUPTHER

## 2023-05-17 NOTE — PROGRESS NOTES
Assessment and Plan:     Problem List Items Addressed This Visit        Endocrine    Hypothyroidism due to Hashimoto's thyroiditis    Relevant Medications    levothyroxine 75 mcg tablet    Diabetic ulcer of left midfoot associated with type 2 diabetes mellitus, with bone involvement without evidence of necrosis ([address] )    Other diseases of thymus ([address] )       Cardiovascular and Mediastinum    Chronic venous hypertension (idiopathic) with ulcer of left lower extremity (CODE) (HCC)       Nervous and Auditory    Acute encephalopathy       Musculoskeletal and Integument    Rheumatoid arthritis involving multiple sites with positive rheumatoid factor (HCC) (Chronic)    Relevant Medications    hydroxychloroquine (PLAQUENIL) 200 mg tablet    Age-related osteoporosis with current pathological fracture with routine healing     Last decision was given May 2022 with rheumatology  In November she was supposed to be given another dosage however a prior Auth was needed  The MA did not carry out this prior authorization and she missed the dosage at that time  The  was informed  She is obviously due for prolia today and a prior Auth will be needed  The MA was informed  Closed compression fracture of L3 lumbar vertebra    Relevant Orders    Ambulatory Referral to Neurosurgery       Other    Chronic pain syndrome    Relevant Medications    levothyroxine 75 mcg tablet    Encounter for annual wellness exam in Medicare patient - Primary     Colonoscopy never done and pt refuses  Mammogram overdue and pt refuses  Aged out of paps  Has osteoporosis and does not require screening            Acute low back pain    Relevant Medications    gabapentin (NEURONTIN) 100 mg capsule    Cholecalciferol 50 MCG (2000 UT) TBDP    methocarbamol (ROBAXIN) 500 mg tablet    Protein calorie malnutrition (HCC)    Immunodeficiency due to drugs [facility])   Other Visit Diagnoses     Other insomnia        Relevant Medications nortriptyline (PAMELOR) 50 mg capsule           Preventive health issues were discussed with patient, and age appropriate screening tests were ordered as noted in patient's After Visit Summary  Personalized health advice and appropriate referrals for health education or preventive services given if needed, as noted in patient's After Visit Summary  History of Present Illness:     Patient presents for a Medicare Wellness Visit    HPI   Tru Whitley is a 64 yo F with a ho SLE,RA, osteoporosis, hypothyroidism  Pt was recently in the hospital 4/13 for back pain  An MRI showed an acute to subacute L5 compression fracture and a L3 compression fracture age indeterminate  She refused inpt rehab at that time  She was transferred to [facility] 4/17 from the nursing home for inpt rehab and was discharged 5/4  During the hospitalization she had acute encephalopathy  She was discharged with home PTOT, oxycodone 5 qd  She was advised to see geriatrics as outpatient as well as neurosurgery  Also advised to stop lorazepam usage  She ran out of medication on Monday and previously was doing well  She stopped oxy about a week prior and was doing well without medication  She was discharged with ativan but notes say to stop this  She has osteoporosis and her last dose was given 5/2022  She was seen in November and was supposed to have a repeat dosage but a prior auth was needed  The prior auth was never completed by ancillary staff and she was not given the medication  Patient Care Team:  Eugene Hale MD as PCP - General (Family Medicine)  Jana Zuniga MD as PCP - Wound (Wound Care)  Dayan Montes DPM (Podiatry)     Review of Systems:     Review of Systems   Constitutional: Negative for fever and unexpected weight change  HENT: Negative for ear pain, sore throat and trouble swallowing  Eyes: Negative for pain and visual disturbance     Respiratory: Negative for cough, chest tightness, shortness of breath and wheezing  Cardiovascular: Negative for chest pain  Gastrointestinal: Negative for abdominal distention, abdominal pain, blood in stool, constipation, diarrhea, nausea and vomiting  Endocrine: Negative for polydipsia and polyuria  Genitourinary: Negative for dysuria and hematuria  Musculoskeletal: Positive for arthralgias  Negative for back pain and myalgias  Skin: Negative for rash  Neurological: Negative for syncope and headaches  Psychiatric/Behavioral: Negative for suicidal ideas          Problem List:     Patient Active Problem List   Diagnosis   • Anxiety   • RLS (restless legs syndrome)   • S/P total hip arthroplasty   • Hypothyroidism due to Hashimoto's thyroiditis   • Age-related osteoporosis with current pathological fracture with routine healing   • RBBB   • Ambulatory dysfunction   • Closed compression fracture of L3 lumbar vertebra   • Rheumatoid arthritis involving multiple sites with positive rheumatoid factor ([facility])   • Chronic pain syndrome   • Vitamin D deficiency   • Other forms of systemic lupus erythematosus ([facility] )   • Depression   • Shoulder dislocation   • Rupture long head biceps tendon, left, initial encounter   • S/P reverse total shoulder arthroplasty, left   • Encounter for annual wellness exam in Medicare patient   • Venous insufficiency   • Hyponatremia   • Anemia   • Pressure injury of left foot, stage 4 ([facility])   • Non-healing open wound of heel, initial encounter   • 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[facility])   • Concern for cerebral contusion   • Arm bruise, right, initial encounter   • Effusion of right shoulder joint   • Acute low back pain   • Compression fracture of L5 vertebra with routine healing   • Acute encephalopathy   • Polypharmacy   • Hypercalcemia   • Weight loss   • Protein calorie malnutrition ([facility] )   • Immunodeficiency due to drugs ([facility] )   • Chronic venous hypertension (idiopathic) with ulcer of left lower extremity (CODE) ([facility] )   • Diabetic ulcer of left midfoot associated with type 2 diabetes mellitus, with bone involvement without evidence of necrosis [facility])   • Other diseases of thymus [facility])      Past Medical and Surgical History:     Past Medical History:   Diagnosis Date   • Acute blood loss anemia 9/9/2020   • Aftercare following joint replacement 9/9/2020    Patient had right reversed total shoulder arthroplasty   Pain was controlled when he left the hospital      • Anxiety    • Arthritis    • Cellulitis of left lower extremity 11/30/2019   • Depression    • Disease of thyroid gland     HYPO   • Dyspepsia 11/12/2019   • Dysphagia 2/6/2023   • Femur neck fracture (HCC)    • GERD (gastroesophageal reflux disease)    • Hyperthyroidism     RESOLVED: [id number]   • Hyponatremia 7/25/2022   • Leg pain 2/6/2023   • Osteoporosis    • Polio    • PVD (peripheral vascular disease) ([address] )    • Right BBB/left ant fasc block    • Shoulder pain, bilateral 7/27/2021   • UTI (urinary tract infection)    • Varicella infection     LAST ASSESSED: [id number]     Past Surgical History:   Procedure Laterality Date   • APPENDECTOMY     • HIP ARTHROPLASTY Left 11/27/2017    Procedure: REMOVAL IM NAIL CONVERSION TO TOTAL HIP ARTHROPLASTY POSTERIOR APPROACH;  Surgeon: Corinne Marie MD;  [facility]: [facility];  Service: Orthopedics   • HIP FRACTURE SURGERY     • HIP SURGERY      both hips replaced;2013 left ,2014 right   • JOINT REPLACEMENT Right     HIP TOTAL   • WA ARTHROPLASTY GLENOHUMERAL JOINT TOTAL SHOULDER Right 9/2/2020    Procedure: ARTHROPLASTY SHOULDER REVERSE;  Surgeon: Kamron Quan MD;  [facility];  Service: Orthopedics   • WA ARTHROPLASTY GLENOHUMERAL JOINT TOTAL SHOULDER Left 6/1/2021    Procedure: ARTHROPLASTY SHOULDER REVERSE;  Surgeon: Kamron Quan MD;  [facility];  Service: Orthopedics   • WA CONV PREV HIP TOT HIP ARTHRP W/WO AGRFT/ALGRFT Left 10/4/2017    Procedure: Hareware removal of left hip;  Surgeon: Kirk Pacheco MD;  [facility];  Service: Orthopedics   • WA [address] WRST SURG W/RLS TRANSVRS CARPL LIGM Right 5/24/2022    Procedure: RELEASE CARPAL TUNNEL ENDOSCOPIC-right;  Surgeon: Shiloh Gonzales MD;  [facility];  Service: Orthopedics   • REVISION TOTAL HIP ARTHROPLASTY Right    • TOE AMPUTATION Left 7/24/2022    Procedure: 5TH METATARSAL RESECTION WITH BOTTOM FLAP CLOSURE;  Surgeon: Lilo Knott DPM;  [facility];  Service: Podiatry   • TONSILLECTOMY        Family History:     Family History   Problem Relation Age of Onset   • Rheum arthritis Mother    • Rheum arthritis Sister    • Prostate cancer Brother       Social History:     Social History     Socioeconomic History   • Marital status:      Spouse name: None   • Number of children: 1   • Years of education: None   • Highest education level: None   Occupational History   • Occupation: RETIRED    Tobacco Use   • Smoking status: Former   • Smokeless tobacco: Never   • Tobacco comments:     quit 20-30 years ago   Vaping Use   • Vaping Use: Never used   Substance and Sexual Activity   • Alcohol use: Not Currently   • Drug use: Not Currently   • Sexual activity: Not Currently   Other Topics Concern   • None   Social History Narrative    Always uses seat belt    Caffeine use    Gnosticism    Single as per all scripts      Social Determinants of Health     Financial Resource Strain: Not on file   Food Insecurity: No Food Insecurity   • Worried About Running Out of Food in the Last Year: Never true   • Ran Out of Food in the Last Year: Never true   Transportation Needs: No Transportation Needs   • Lack of Transportation (Medical): No   • Lack of Transportation (Non-Medical):  No   Physical Activity: Not on file   Stress: Not on file   Social Connections: Not on file   Intimate Partner Violence: Not on file   Housing Stability: Low Risk    • Unable to Pay for Housing in the Last Year: No   • Number of Places Lived in the Last Year: 1   • Unstable Housing in the Last Year: No      Medications and Allergies:     Current Outpatient Medications   Medication Sig Dispense Refill   • Cholecalciferol 50 MCG (2000 UT) TBDP Take 1 tablet (2,000 Units total) by mouth daily 90 tablet 1   • folic acid (FOLVITE) 1 mg tablet Take 2 mg by mouth daily     • gabapentin (NEURONTIN) 100 mg capsule Take 2 capsules (200 mg total) by mouth 3 (three) times a day 90 capsule 0   • hydroxychloroquine (PLAQUENIL) 200 mg tablet Take 1 tablet (200 mg total) by mouth 2 (two) times a day with meals 1 tab in am 1/2 tab in pm 180 tablet 1   • levothyroxine 75 mcg tablet Take 1 tablet (75 mcg total) by mouth daily in the early morning 90 tablet 1   • lidocaine (LMX) 4 % cream Apply topically as needed for mild pain 30 g 0   • methocarbamol (ROBAXIN) 500 mg tablet Take 1 tablet (500 mg total) by mouth daily as needed for muscle spasms 30 tablet 3   • methotrexate 2 5 MG tablet 3 tablets by mouth once a week on Thursday     • Multiple Vitamins-Minerals (CENTRUM SILVER PO) Take 1 tablet by mouth daily     • nortriptyline (PAMELOR) 50 mg capsule Take 2 capsules (100 mg total) by mouth daily at bedtime 60 capsule 2   • polyethylene glycol (MIRALAX) 17 g packet Take 17 g by mouth daily Do not start before April 18, 2023   0   • rOPINIRole (REQUIP) 2 mg tablet Take 1 tablet (2 mg total) by mouth daily at bedtime 90 tablet 0   • senna-docusate sodium (SENOKOT S) 8 6-50 mg per tablet Take 1 tablet by mouth 2 (two) times a day 20 tablet 0   • acetaminophen (TYLENOL) 650 mg CR tablet Take 2 tablets (1,300 mg total) by mouth 3 (three) times a day (Patient not taking: Reported on 5/17/2023) 30 tablet 0   • melatonin 3 mg Take 1 tablet (3 mg total) by mouth daily at bedtime 30 tablet 0   • oxyCODONE (ROXICODONE) 20 MG TABS Take 2 5 mg by mouth every 8 (eight) hours as needed for severe pain   Indications: Chronic Pain (Patient not taking: Reported on 5/17/2023) • oxyCODONE (ROXICODONE) 5 immediate release tablet Take 5 mg by mouth in the morning  Indications: Chronic Pain (Patient not taking: Reported on 5/17/2023)       No current facility-administered medications for this visit  No Known Allergies   Immunizations:     Immunization History   Administered Date(s) Administered   • COVID-19 PFIZER VACCINE 0 3 ML IM 04/09/2021, 05/04/2021, 04/16/2022   • COVID-19 Pfizer vac (Joseph-sucrose, gray cap) 12 yr+ IM 04/16/2022   • DTP 10/27/2010   • INFLUENZA 10/11/2015, 09/23/2016, 09/21/2018   • Influenza, high dose seasonal 0 7 mL 10/20/2021, 11/21/2022   • Influenza, seasonal, injectable 10/27/2010, 09/23/2017, 10/20/2021   • Pneumococcal Conjugate 13-Valent 05/16/2017, 09/23/2017   • Pneumococcal Polysaccharide PPV23 11/03/2004, 09/28/2018   • Tdap 03/11/2016, 12/26/2021   • Tuberculin Skin Test 08/03/2022   • Zoster 01/07/2021   • Zoster Vaccine Recombinant 10/20/2020, 01/07/2021   • influenza, trivalent, adjuvanted 09/21/2018, 10/05/2019      Health Maintenance:         Topic Date Due   • Hepatitis C Screening  Never done         Topic Date Due   • COVID-19 Vaccine (5 - Booster for Dias Indra series) 06/11/2022      Medicare Screening Tests and Risk Assessments:     Kandice Rosario is here for her Subsequent Wellness visit  Last Medicare Wellness visit information reviewed, patient interviewed, no change since last AWV  Health Risk Assessment:   Patient rates overall health as poor  Patient feels that their physical health rating is slightly worse  Patient is satisfied with their life  Eyesight was rated as same  Hearing was rated as same  Patient feels that their emotional and mental health rating is same  Patients states they are often angry  Patient states they are sometimes unusually tired/fatigued  Pain experienced in the last 7 days has been some  Patient's pain rating has been 10/10  Patient states that she has experienced no weight loss or gain in last 6 months   Patient reports pain in her lower back     Depression Screening:   PHQ-9 Score: 1      Fall Risk Screening: In the past year, patient has experienced: history of falling in past year    Number of falls: 2 or more  Injured during fall?: Yes    Feels unsteady when standing or walking?: Yes    Worried about falling?: Yes      Urinary Incontinence Screening:   Patient has leaked urine accidently in the last six months  Home Safety:  Patient has trouble with stairs inside or outside of their home  Patient has working smoke alarms and has working carbon monoxide detector  Home safety hazards include: none  Nutrition:   Current diet is Regular  Medications:   Patient is currently taking over-the-counter supplements  OTC medications include: see medication list  Patient is not able to manage medications  Activities of Daily Living (ADLs)/Instrumental Activities of Daily Living (IADLs):   Walk and transfer into and out of bed and chair?: No  Dress and groom yourself?: No    Bathe or shower yourself?: No    Feed yourself?  Yes  Do your laundry/housekeeping?: No  Manage your money, pay your bills and track your expenses?: No  Make your own meals?: No    Do your own shopping?: No    Previous Hospitalizations:   Any hospitalizations or ED visits within the last 12 months?: Yes    How many hospitalizations have you had in the last year?: 1-2    Advance Care Planning:   Living will: Yes    Advanced directive: Yes      Cognitive Screening:   Provider or family/friend/caregiver concerned regarding cognition?: No    PREVENTIVE SCREENINGS      Cardiovascular Screening:    General: Screening Current      Diabetes Screening:     General: Screening Not Indicated and History Diabetes      Colorectal Cancer Screening:     General: Screening Not Indicated      Breast Cancer Screening:     General: Screening Not Indicated      Cervical Cancer Screening:    General: Screening Not Indicated      Osteoporosis Screening:    General: "Screening Not Indicated and History Osteoporosis      Abdominal Aortic Aneurysm (AAA) Screening:        General: Screening Not Indicated      Lung Cancer Screening:     General: Screening Not Indicated      Hepatitis C Screening:    General: Screening Not Indicated    Screening, Brief Intervention, and Referral to Treatment (SBIRT)    Screening  Typical number of drinks in a day: 0  Typical number of drinks in a week: 0  Interpretation: Low risk drinking behavior  Single Item Drug Screening:  How often have you used an illegal drug (including marijuana) or a prescription medication for non-medical reasons in the past year? never    Single Item Drug Screen Score: 0  Interpretation: Negative screen for possible drug use disorder    Brief Intervention  Alcohol & drug use screenings were reviewed  No concerns regarding substance use disorder identified  Other Counseling Topics:   Calcium and vitamin D intake and regular weightbearing exercise  No results found  Physical Exam:     /70 (BP Location: Left arm, Patient Position: Sitting, Cuff Size: Adult)   Pulse 75   Temp 98 9 °F (37 2 °C) (Tympanic)   Resp 16   Ht 4' 9\" (1 448 m)   Wt 43 9 kg (96 lb 11 2 oz)   SpO2 99%   BMI 20 93 kg/m²     Physical Exam  Constitutional:       General: She is not in acute distress  Appearance: She is well-developed  She is not diaphoretic  HENT:      Head: Normocephalic and atraumatic  Right Ear: External ear normal       Left Ear: External ear normal    Eyes:      Conjunctiva/sclera: Conjunctivae normal    Pulmonary:      Effort: Pulmonary effort is normal  No respiratory distress  Neurological:      Mental Status: She is alert and oriented to person, place, and time            Anirudh Cash MD  "

## 2023-05-17 NOTE — TELEPHONE ENCOUNTER
Message left:     [facility] we got for Claudio Swift Date of birth 1945 was for her Plaquenil came over with directions  Plaquenil 200 dispenser quantity 180 directions  Say take one tablet by mouth two times a day with meals and then it goes on to say 1 tablet in the morning half tablet in the evening  So I'm not sure what directions that you want on there  If you could let us know or resend it correctly  [phone] with any questions  Thank you

## 2023-05-17 NOTE — PROGRESS NOTES
Assessment/Plan:    No problem-specific Assessment & Plan notes found for this encounter  {Assess/PlanSStra[facility]:72845}      Subjective:      Patient ID: Ashley Saldana is a 65 y o  female  HPI  Pt was recently in the hospital  for back pain  An MRI showed an acute to subacute L5 compression fracture and a L3 compression fracture age indeterminate  She refused inpt rehab at that time  She was transferred to [facility]  from the nursing home for inpt rehab and was discharged   During the hospitalization she had acute encephalopathy  She was discharged with home PTOT, oxycodone 5 qd  She was advised to see geriatrics as outpatient as well as neurosurgery  Also advised to stop lorazepam usage  She ran out of medication on Monday and previously was doing well  She was discharge with ativan but notes say to stop this  {Common ambulatory SmartLinks:21064}    Review of Systems      PHQ-2/9 Depression Screening    Little interest or pleasure in doing things: 0 - not at all  Feeling down, depressed, or hopeless: 0 - not at all  Trouble falling or staying asleep, or sleeping too much: 0 - not at all  Feeling tired or having little energy: 1 - several days  Poor appetite or overeatin - not at all  Feeling bad about yourself - or that you are a failure or have let yourself or your family down: 0 - not at all  Trouble concentrating on things, such as reading the newspaper or watching television: 0 - not at all  Moving or speaking so slowly that other people could have noticed   Or the opposite - being so fidgety or restless that you have been moving around a lot more than usual: 0 - not at all  Thoughts that you would be better off dead, or of hurting yourself in some way: 0 - not at all  PHQ-9 Score: 1   PHQ-9 Interpretation: No or Minimal depression        [unfilled]    Objective:      /70 (BP Location: Left arm, Patient Position: Sitting, Cuff Size: Adult)   Pulse 75   Temp "98 9 °F (37 2 °C) (Tympanic)   Resp 16   Ht 4' 9\" (1 448 m)   Wt 43 9 kg (96 lb 11 2 oz)   SpO2 99%   BMI 20 93 kg/m²          Physical Exam    "

## 2023-05-17 NOTE — ASSESSMENT & PLAN NOTE
Last decision was given May 2022 with rheumatology  In November she was supposed to be given another dosage however a prior Auth was needed  The MA did not carry out this prior authorization and she missed the dosage at that time  The  was informed  She is obviously due for prolia today and a prior Auth will be needed  The MA was informed

## 2023-05-17 NOTE — PATIENT INSTRUCTIONS
Medicare Preventive Visit Patient Instructions  Thank you for completing your Welcome to Medicare Visit or Medicare Annual Wellness Visit today  Your next wellness visit will be due in one year (5/17/2024)  The screening/preventive services that you may require over the next 5-10 years are detailed below  Some tests may not apply to you based off risk factors and/or age  Screening tests ordered at today's visit but not completed yet may show as past due  Also, please note that scanned in results may not display below  Preventive Screenings:  Service Recommendations Previous Testing/Comments   Colorectal Cancer Screening  * Colonoscopy    * Fecal Occult Blood Test (FOBT)/Fecal Immunochemical Test (FIT)  * Fecal DNA/Cologuard Test  * Flexible Sigmoidoscopy Age: 39-70 years old   Colonoscopy: every 10 years (may be performed more frequently if at higher risk)  OR  FOBT/FIT: every 1 year  OR  Cologuard: every 3 years  OR  Sigmoidoscopy: every 5 years  Screening may be recommended earlier than age 39 if at higher risk for colorectal cancer  Also, an individualized decision between you and your healthcare provider will decide whether screening between the ages of 74-80 would be appropriate  Colonoscopy: Not on file  FOBT/FIT: Not on file  Cologuard: Not on file  Sigmoidoscopy: Not on file          Breast Cancer Screening Age: 36 years old  Frequency: every 1-2 years  Not required if history of left and right mastectomy Mammogram: Not on file        Cervical Cancer Screening Between the ages of 21-29, pap smear recommended once every 3 years  Between the ages of 33-67, can perform pap smear with HPV co-testing every 5 years     Recommendations may differ for women with a history of total hysterectomy, cervical cancer, or abnormal pap smears in past  Pap Smear: Not on file    Screening Not Indicated   Hepatitis C Screening Once for adults born between [facility]  More frequently in patients at high risk for Hepatitis C Hep C Antibody: Not on file        Diabetes Screening 1-2 times per year if you're at risk for diabetes or have pre-diabetes Fasting glucose: 77 mg/dL (4/27/2023)  A1C: 5 0 % (11/30/2022)  Screening Not Indicated  History Diabetes   Cholesterol Screening Once every 5 years if you don't have a lipid disorder  May order more often based on risk factors  Lipid panel: 11/30/2022    Screening Current     Other Preventive Screenings Covered by Medicare:  1  Abdominal Aortic Aneurysm (AAA) Screening: covered once if your at risk  You're considered to be at risk if you have a family history of AAA  2  Lung Cancer Screening: covers low dose CT scan once per year if you meet all of the following conditions: (1) Age 50-69; (2) No signs or symptoms of lung cancer; (3) Current smoker or have quit smoking within the last 15 years; (4) You have a tobacco smoking history of at least 20 pack years (packs per day multiplied by number of years you smoked); (5) You get a written order from a healthcare provider  3  Glaucoma Screening: covered annually if you're considered high risk: (1) You have diabetes OR (2) Family history of glaucoma OR (3)  aged 48 and older OR (3)  American aged 72 and older  3  Osteoporosis Screening: covered every 2 years if you meet one of the following conditions: (1) You're estrogen deficient and at risk for osteoporosis based off medical history and other findings; (2) Have a vertebral abnormality; (3) On glucocorticoid therapy for more than 3 months; (4) Have primary hyperparathyroidism; (5) On osteoporosis medications and need to assess response to drug therapy  · Last bone density test (DXA Scan): 04/17/2017  5  HIV Screening: covered annually if you're between the age of 12-76  Also covered annually if you are younger than 13 and older than 72 with risk factors for HIV infection   For pregnant patients, it is covered up to 3 times per pregnancy  Immunizations:  Immunization Recommendations   Influenza Vaccine Annual influenza vaccination during flu season is recommended for all persons aged >= 6 months who do not have contraindications   Pneumococcal Vaccine   * Pneumococcal conjugate vaccine = PCV13 (Prevnar 13), PCV15 (Vaxneuvance), PCV20 (Prevnar 20)  * Pneumococcal polysaccharide vaccine = PPSV23 (Pneumovax) Adults 25-60 years old: 1-3 doses may be recommended based on certain risk factors  Adults 72 years old: 1-2 doses may be recommended based off what pneumonia vaccine you previously received   Hepatitis B Vaccine 3 dose series if at intermediate or high risk (ex: diabetes, end stage renal disease, liver disease)   Tetanus (Td) Vaccine - COST NOT COVERED BY MEDICARE PART B Following completion of primary series, a booster dose should be given every 10 years to maintain immunity against tetanus  Td may also be given as tetanus wound prophylaxis  Tdap Vaccine - COST NOT COVERED BY MEDICARE PART B Recommended at least once for all adults  For pregnant patients, recommended with each pregnancy  Shingles Vaccine (Shingrix) - COST NOT COVERED BY MEDICARE PART B  2 shot series recommended in those aged 48 and above     Health Maintenance Due:      Topic Date Due   • Hepatitis C Screening  Never done     Immunizations Due:      Topic Date Due   • COVID-19 Vaccine (5 - Booster for [employer] series) 06/11/2022     Advance Directives   What are advance directives? Advance directives are legal documents that state your wishes and plans for medical care  These plans are made ahead of time in case you lose your ability to make decisions for yourself  Advance directives can apply to any medical decision, such as the treatments you want, and if you want to donate organs  What are the types of advance directives? There are many types of advance directives, and each state has rules about how to use them   You may choose a combination of any of the following:  · Living will: This is a written record of the treatment you want  You can also choose which treatments you do not want, which to limit, and which to stop at a certain time  This includes surgery, medicine, IV fluid, and tube feedings  · Durable power of  for healthcare [facility]): This is a written record that states who you want to make healthcare choices for you when you are unable to make them for yourself  This person, called a proxy, is usually a family member or a friend  You may choose more than 1 proxy  · Do not resuscitate (DNR) order:  A DNR order is used in case your heart stops beating or you stop breathing  It is a request not to have certain forms of treatment, such as CPR  A DNR order may be included in other types of advance directives  · Medical directive: This covers the care that you want if you are in a coma, near death, or unable to make decisions for yourself  You can list the treatments you want for each condition  Treatment may include pain medicine, surgery, blood transfusions, dialysis, IV or tube feedings, and a ventilator (breathing machine)  · Values history: This document has questions about your views, beliefs, and how you feel and think about life  This information can help others choose the care that you would choose  Why are advance directives important? An advance directive helps you control your care  Although spoken wishes may be used, it is better to have your wishes written down  Spoken wishes can be misunderstood, or not followed  Treatments may be given even if you do not want them  An advance directive may make it easier for your family to make difficult choices about your care  Fall Prevention    Fall prevention  includes ways to make your home and other areas safer  It also includes ways you can move more carefully to prevent a fall   Health conditions that cause changes in your blood pressure, vision, or muscle strength and coordination may increase your risk for falls  Medicines may also increase your risk for falls if they make you dizzy, weak, or sleepy  Fall prevention tips:   · Stand or sit up slowly  · Use assistive devices as directed  · Wear shoes that fit well and have soles that   · Wear a personal alarm  · Stay active  · Manage your medical conditions  Home Safety Tips:  · Add items to prevent falls in the bathroom  · Keep paths clear  · Install bright lights in your home  · Keep items you use often on shelves within reach  · Paint or place reflective tape on the edges of your stairs  Urinary Incontinence   Urinary incontinence (UI)  is when you lose control of your bladder  UI develops because your bladder cannot store or empty urine properly  The 3 most common types of UI are stress incontinence, urge incontinence, or both  Medicines:   · May be given to help strengthen your bladder control  Report any side effects of medication to your healthcare provider  Do pelvic muscle exercises often:  Your pelvic muscles help you stop urinating  Squeeze these muscles tight for 5 seconds, then relax for 5 seconds  Gradually work up to squeezing for 10 seconds  Do 3 sets of 15 repetitions a day, or as directed  This will help strengthen your pelvic muscles and improve bladder control  Train your bladder:  Go to the bathroom at set times, such as every 2 hours, even if you do not feel the urge to go  You can also try to hold your urine when you feel the urge to go  For example, hold your urine for 5 minutes when you feel the urge to go  As that becomes easier, hold your urine for 10 minutes  Self-care:   · Keep a UI record  Write down how often you leak urine and how much you leak  Make a note of what you were doing when you leaked urine  · Drink liquids as directed  You may need to limit the amount of liquid you drink to help control your urine leakage  Do not drink any liquid right before you go to bed  Limit or do not have drinks that contain caffeine or alcohol  · Prevent constipation  Eat a variety of high-fiber foods  Good examples are high-fiber cereals, beans, vegetables, and whole-grain breads  Walking is the best way to trigger your intestines to have a bowel movement  · Exercise regularly and maintain a healthy weight  Weight loss and exercise will decrease pressure on your bladder and help you control your leakage  · Use a catheter as directed  to help empty your bladder  A catheter is a tiny, plastic tube that is put into your bladder to drain your urine  · Go to behavior therapy as directed  Behavior therapy may be used to help you learn to control your urge to urinate  © Copyright [employer] 2018 Information is for End User's use only and may not be sold, redistributed or otherwise used for commercial purposes   All illustrations and images included in CareNotes® are the copyrighted property of A D A M , Inc  or [address] [place] [address] [employer]

## 2023-05-18 ENCOUNTER — HOME CARE VISIT (OUTPATIENT)
Dept: HOME HEALTH SERVICES | Facility: HOME HEALTHCARE | Age: 78
End: 2023-05-18

## 2023-05-18 VITALS
HEART RATE: 72 BPM | TEMPERATURE: 98 F | SYSTOLIC BLOOD PRESSURE: 128 MMHG | DIASTOLIC BLOOD PRESSURE: 72 MMHG | RESPIRATION RATE: 20 BRPM | OXYGEN SATURATION: 98 %

## 2023-05-18 NOTE — TELEPHONE ENCOUNTER
Can you just confirm how the patient is taking the plaquenil? Rheum was previously filling this medication   I think its 1 tab in am and 1/2 tab in pm

## 2023-05-19 ENCOUNTER — HOME CARE VISIT (OUTPATIENT)
Dept: HOME HEALTH SERVICES | Facility: HOME HEALTHCARE | Age: 78
End: 2023-05-19

## 2023-05-19 VITALS
OXYGEN SATURATION: 98 % | HEART RATE: 72 BPM | RESPIRATION RATE: 20 BRPM | SYSTOLIC BLOOD PRESSURE: 118 MMHG | DIASTOLIC BLOOD PRESSURE: 70 MMHG

## 2023-05-19 DIAGNOSIS — M05.79 RHEUMATOID ARTHRITIS INVOLVING MULTIPLE SITES WITH POSITIVE RHEUMATOID FACTOR (HCC): Chronic | ICD-10-CM

## 2023-05-19 RX ORDER — HYDROXYCHLOROQUINE SULFATE 200 MG/1
TABLET, FILM COATED ORAL
Qty: 135 TABLET | Refills: 1 | Status: SHIPPED | OUTPATIENT
Start: 2023-05-19 | End: 2023-08-17

## 2023-05-19 NOTE — TELEPHONE ENCOUNTER
Called and spoke with patient's niece Noni Nobles, she confirmed the medication Plaquenil dose and directions as: Take 1 tablet (Total 200 mg ) in the morning and take 1/2 tablet at bedtime (Total 100 mg )

## 2023-05-23 ENCOUNTER — HOME CARE VISIT (OUTPATIENT)
Dept: HOME HEALTH SERVICES | Facility: HOME HEALTHCARE | Age: 78
End: 2023-05-23

## 2023-05-23 VITALS
HEART RATE: 76 BPM | SYSTOLIC BLOOD PRESSURE: 118 MMHG | RESPIRATION RATE: 20 BRPM | TEMPERATURE: 98 F | OXYGEN SATURATION: 99 % | DIASTOLIC BLOOD PRESSURE: 64 MMHG

## 2023-05-24 ENCOUNTER — HOME CARE VISIT (OUTPATIENT)
Dept: HOME HEALTH SERVICES | Facility: HOME HEALTHCARE | Age: 78
End: 2023-05-24

## 2023-05-24 VITALS
OXYGEN SATURATION: 98 % | RESPIRATION RATE: 20 BRPM | SYSTOLIC BLOOD PRESSURE: 118 MMHG | HEART RATE: 76 BPM | DIASTOLIC BLOOD PRESSURE: 76 MMHG

## 2023-05-25 ENCOUNTER — TELEPHONE (OUTPATIENT)
Dept: FAMILY MEDICINE CLINIC | Facility: CLINIC | Age: 78
End: 2023-05-25

## 2023-05-25 ENCOUNTER — TELEPHONE (OUTPATIENT)
Dept: NEUROSURGERY | Facility: CLINIC | Age: 78
End: 2023-05-25

## 2023-05-25 ENCOUNTER — HOME CARE VISIT (OUTPATIENT)
Dept: HOME HEALTH SERVICES | Facility: HOME HEALTHCARE | Age: 78
End: 2023-05-25

## 2023-05-25 DIAGNOSIS — M25.561 ACUTE PAIN OF BOTH KNEES: Primary | ICD-10-CM

## 2023-05-25 DIAGNOSIS — M25.562 ACUTE PAIN OF BOTH KNEES: Primary | ICD-10-CM

## 2023-05-25 RX ORDER — TRAMADOL HYDROCHLORIDE 50 MG/1
50 TABLET ORAL 2 TIMES DAILY PRN
Qty: 15 TABLET | Refills: 0 | Status: SHIPPED | OUTPATIENT
Start: 2023-05-25 | End: 2023-06-03

## 2023-05-25 NOTE — TELEPHONE ENCOUNTER
I spoke with the pts niece  Devin Jacobsen is refusing to ambulate and cannot make it to the office and refuses to go to the ER  She says that she cannot move because her knees are unstable but the PT notes say that ambulation is limited by pain  She has failed NSAIDS  Will trial tramadol prn to try and get her to participate in PT and make it into the office  She is being monitored by her family and home nursing  Her niece will update me on Tuesday

## 2023-05-25 NOTE — TELEPHONE ENCOUNTER
Knee and a little back pain she can't move she asked for something for the pain  She said she can't walk and during PT she wasn't able to stand either  Feeling this way for 2 days  Her Sarah Steele called because she does not know what to do with her patient refused to go to ER  Asking for a pain med or other options

## 2023-05-25 NOTE — TELEPHONE ENCOUNTER
5/25/23- CALLED AND SPOKE TO TAVIA (POA), OFFERED TO R/S TODAYS APT  PER TAVIA, SHE DOES NOT WISH TO R/S APT AT THIS TIME

## 2023-05-26 ENCOUNTER — HOME CARE VISIT (OUTPATIENT)
Dept: HOME HEALTH SERVICES | Facility: HOME HEALTHCARE | Age: 78
End: 2023-05-26

## 2023-05-26 VITALS
SYSTOLIC BLOOD PRESSURE: 132 MMHG | DIASTOLIC BLOOD PRESSURE: 76 MMHG | HEART RATE: 76 BPM | RESPIRATION RATE: 20 BRPM | OXYGEN SATURATION: 98 %

## 2023-05-30 ENCOUNTER — HOME CARE VISIT (OUTPATIENT)
Dept: HOME HEALTH SERVICES | Facility: HOME HEALTHCARE | Age: 78
End: 2023-05-30

## 2023-05-30 DIAGNOSIS — G25.81 RESTLESS LEGS SYNDROME: ICD-10-CM

## 2023-05-30 RX ORDER — ROPINIROLE 2 MG/1
2 TABLET, FILM COATED ORAL
Qty: 90 TABLET | Refills: 0 | Status: SHIPPED | OUTPATIENT
Start: 2023-05-30 | End: 2023-06-05 | Stop reason: SDUPTHER

## 2023-05-31 ENCOUNTER — HOME CARE VISIT (OUTPATIENT)
Dept: HOME HEALTH SERVICES | Facility: HOME HEALTHCARE | Age: 78
End: 2023-05-31

## 2023-05-31 VITALS
SYSTOLIC BLOOD PRESSURE: 124 MMHG | OXYGEN SATURATION: 98 % | DIASTOLIC BLOOD PRESSURE: 64 MMHG | RESPIRATION RATE: 20 BRPM | HEART RATE: 72 BPM

## 2023-05-31 VITALS
DIASTOLIC BLOOD PRESSURE: 62 MMHG | HEART RATE: 80 BPM | TEMPERATURE: 97.6 F | SYSTOLIC BLOOD PRESSURE: 112 MMHG | RESPIRATION RATE: 18 BRPM | OXYGEN SATURATION: 97 %

## 2023-06-01 ENCOUNTER — APPOINTMENT (EMERGENCY)
Dept: RADIOLOGY | Facility: HOSPITAL | Age: 78
DRG: 690 | End: 2023-06-01
Payer: MEDICARE

## 2023-06-01 ENCOUNTER — HOSPITAL ENCOUNTER (INPATIENT)
Facility: HOSPITAL | Age: 78
LOS: 1 days | Discharge: HOME HEALTH | DRG: 690 | End: 2023-06-03
Attending: EMERGENCY MEDICINE | Admitting: INTERNAL MEDICINE
Payer: MEDICARE

## 2023-06-01 ENCOUNTER — HOME CARE VISIT (OUTPATIENT)
Dept: HOME HEALTH SERVICES | Facility: HOME HEALTHCARE | Age: 78
End: 2023-06-01

## 2023-06-01 DIAGNOSIS — R53.1 WEAKNESS: ICD-10-CM

## 2023-06-01 DIAGNOSIS — R26.2 AMBULATORY DYSFUNCTION: ICD-10-CM

## 2023-06-01 DIAGNOSIS — N39.0 UTI (URINARY TRACT INFECTION): Primary | ICD-10-CM

## 2023-06-01 DIAGNOSIS — M05.79 RHEUMATOID ARTHRITIS INVOLVING MULTIPLE SITES WITH POSITIVE RHEUMATOID FACTOR (HCC): Chronic | ICD-10-CM

## 2023-06-01 DIAGNOSIS — R33.9 URINARY RETENTION: ICD-10-CM

## 2023-06-01 DIAGNOSIS — S32.030D CLOSED COMPRESSION FRACTURE OF L3 LUMBAR VERTEBRA WITH ROUTINE HEALING, SUBSEQUENT ENCOUNTER: Primary | ICD-10-CM

## 2023-06-01 DIAGNOSIS — G89.4 CHRONIC PAIN SYNDROME: ICD-10-CM

## 2023-06-01 PROBLEM — N30.01 ACUTE CYSTITIS WITH HEMATURIA: Status: ACTIVE | Noted: 2023-06-01

## 2023-06-01 LAB
ALBUMIN SERPL BCP-MCNC: 3.1 G/DL (ref 3.5–5)
ALP SERPL-CCNC: 125 U/L (ref 46–116)
ALT SERPL W P-5'-P-CCNC: 22 U/L (ref 12–78)
ANION GAP SERPL CALCULATED.3IONS-SCNC: 6 MMOL/L (ref 4–13)
APTT PPP: 27 SECONDS (ref 23–37)
AST SERPL W P-5'-P-CCNC: 43 U/L (ref 5–45)
ATRIAL RATE: 106 BPM
BACTERIA UR QL AUTO: ABNORMAL /HPF
BASOPHILS # BLD AUTO: 0.02 THOUSANDS/ÂΜL (ref 0–0.1)
BASOPHILS NFR BLD AUTO: 0 % (ref 0–1)
BILIRUB SERPL-MCNC: 0.3 MG/DL (ref 0.2–1)
BILIRUB UR QL STRIP: NEGATIVE
BUN SERPL-MCNC: 11 MG/DL (ref 5–25)
CALCIUM ALBUM COR SERPL-MCNC: 9.7 MG/DL (ref 8.3–10.1)
CALCIUM SERPL-MCNC: 9 MG/DL (ref 8.3–10.1)
CHLORIDE SERPL-SCNC: 106 MMOL/L (ref 96–108)
CLARITY UR: CLEAR
CO2 SERPL-SCNC: 25 MMOL/L (ref 21–32)
COLOR UR: COLORLESS
CREAT SERPL-MCNC: 0.56 MG/DL (ref 0.6–1.3)
EOSINOPHIL # BLD AUTO: 0.1 THOUSAND/ÂΜL (ref 0–0.61)
EOSINOPHIL NFR BLD AUTO: 1 % (ref 0–6)
ERYTHROCYTE [DISTWIDTH] IN BLOOD BY AUTOMATED COUNT: 15.9 % (ref 11.6–15.1)
FLUAV RNA RESP QL NAA+PROBE: NEGATIVE
FLUBV RNA RESP QL NAA+PROBE: NEGATIVE
GFR SERPL CREATININE-BSD FRML MDRD: 89 ML/MIN/1.73SQ M
GLUCOSE SERPL-MCNC: 113 MG/DL (ref 65–140)
GLUCOSE SERPL-MCNC: 124 MG/DL (ref 65–140)
GLUCOSE SERPL-MCNC: 69 MG/DL (ref 65–140)
GLUCOSE UR STRIP-MCNC: NEGATIVE MG/DL
HCT VFR BLD AUTO: 32.6 % (ref 34.8–46.1)
HGB BLD-MCNC: 10.2 G/DL (ref 11.5–15.4)
HGB UR QL STRIP.AUTO: ABNORMAL
IMM GRANULOCYTES # BLD AUTO: 0.03 THOUSAND/UL (ref 0–0.2)
IMM GRANULOCYTES NFR BLD AUTO: 0 % (ref 0–2)
INR PPP: 1.04 (ref 0.84–1.19)
KETONES UR STRIP-MCNC: NEGATIVE MG/DL
LACTATE SERPL-SCNC: 2 MMOL/L (ref 0.5–2)
LEUKOCYTE ESTERASE UR QL STRIP: ABNORMAL
LYMPHOCYTES # BLD AUTO: 1.24 THOUSANDS/ÂΜL (ref 0.6–4.47)
LYMPHOCYTES NFR BLD AUTO: 15 % (ref 14–44)
MAGNESIUM SERPL-MCNC: 1.7 MG/DL (ref 1.6–2.6)
MCH RBC QN AUTO: 26.8 PG (ref 26.8–34.3)
MCHC RBC AUTO-ENTMCNC: 31.3 G/DL (ref 31.4–37.4)
MCV RBC AUTO: 86 FL (ref 82–98)
MONOCYTES # BLD AUTO: 1.02 THOUSAND/ÂΜL (ref 0.17–1.22)
MONOCYTES NFR BLD AUTO: 12 % (ref 4–12)
NEUTROPHILS # BLD AUTO: 6.03 THOUSANDS/ÂΜL (ref 1.85–7.62)
NEUTS SEG NFR BLD AUTO: 72 % (ref 43–75)
NITRITE UR QL STRIP: POSITIVE
NON-SQ EPI CELLS URNS QL MICRO: ABNORMAL /HPF
NRBC BLD AUTO-RTO: 0 /100 WBCS
P AXIS: 62 DEGREES
PH UR STRIP.AUTO: 7.5 [PH]
PLATELET # BLD AUTO: 367 THOUSANDS/UL (ref 149–390)
PMV BLD AUTO: 8.2 FL (ref 8.9–12.7)
POTASSIUM SERPL-SCNC: 3.5 MMOL/L (ref 3.5–5.3)
PR INTERVAL: 170 MS
PROCALCITONIN SERPL-MCNC: 0.06 NG/ML
PROT SERPL-MCNC: 7.1 G/DL (ref 6.4–8.4)
PROT UR STRIP-MCNC: NEGATIVE MG/DL
PROTHROMBIN TIME: 13.8 SECONDS (ref 11.6–14.5)
QRS AXIS: 107 DEGREES
QRSD INTERVAL: 154 MS
QT INTERVAL: 366 MS
QTC INTERVAL: 486 MS
RBC # BLD AUTO: 3.8 MILLION/UL (ref 3.81–5.12)
RBC #/AREA URNS AUTO: ABNORMAL /HPF
RSV RNA RESP QL NAA+PROBE: NEGATIVE
SARS-COV-2 RNA RESP QL NAA+PROBE: NEGATIVE
SODIUM SERPL-SCNC: 137 MMOL/L (ref 135–147)
SP GR UR STRIP.AUTO: 1.01 (ref 1–1.03)
T WAVE AXIS: 62 DEGREES
TSH SERPL DL<=0.05 MIU/L-ACNC: 3.5 UIU/ML (ref 0.45–4.5)
UROBILINOGEN UR STRIP-ACNC: <2 MG/DL
VENTRICULAR RATE: 106 BPM
WBC # BLD AUTO: 8.44 THOUSAND/UL (ref 4.31–10.16)
WBC #/AREA URNS AUTO: ABNORMAL /HPF

## 2023-06-01 PROCEDURE — 80053 COMPREHEN METABOLIC PANEL: CPT

## 2023-06-01 PROCEDURE — 85610 PROTHROMBIN TIME: CPT

## 2023-06-01 PROCEDURE — 85730 THROMBOPLASTIN TIME PARTIAL: CPT

## 2023-06-01 PROCEDURE — 99223 1ST HOSP IP/OBS HIGH 75: CPT | Performed by: INTERNAL MEDICINE

## 2023-06-01 PROCEDURE — 82948 REAGENT STRIP/BLOOD GLUCOSE: CPT

## 2023-06-01 PROCEDURE — 87077 CULTURE AEROBIC IDENTIFY: CPT

## 2023-06-01 PROCEDURE — 71045 X-RAY EXAM CHEST 1 VIEW: CPT

## 2023-06-01 PROCEDURE — RECHECK: Performed by: NURSE PRACTITIONER

## 2023-06-01 PROCEDURE — 83605 ASSAY OF LACTIC ACID: CPT

## 2023-06-01 PROCEDURE — 99284 EMERGENCY DEPT VISIT MOD MDM: CPT

## 2023-06-01 PROCEDURE — 81001 URINALYSIS AUTO W/SCOPE: CPT

## 2023-06-01 PROCEDURE — 87086 URINE CULTURE/COLONY COUNT: CPT

## 2023-06-01 PROCEDURE — 87040 BLOOD CULTURE FOR BACTERIA: CPT

## 2023-06-01 PROCEDURE — 0241U HB NFCT DS VIR RESP RNA 4 TRGT: CPT

## 2023-06-01 PROCEDURE — 84443 ASSAY THYROID STIM HORMONE: CPT | Performed by: INTERNAL MEDICINE

## 2023-06-01 PROCEDURE — 97163 PT EVAL HIGH COMPLEX 45 MIN: CPT

## 2023-06-01 PROCEDURE — 93005 ELECTROCARDIOGRAM TRACING: CPT

## 2023-06-01 PROCEDURE — 36415 COLL VENOUS BLD VENIPUNCTURE: CPT

## 2023-06-01 PROCEDURE — 83735 ASSAY OF MAGNESIUM: CPT | Performed by: INTERNAL MEDICINE

## 2023-06-01 PROCEDURE — 93010 ELECTROCARDIOGRAM REPORT: CPT | Performed by: INTERNAL MEDICINE

## 2023-06-01 PROCEDURE — 85025 COMPLETE CBC W/AUTO DIFF WBC: CPT

## 2023-06-01 PROCEDURE — 87186 SC STD MICRODIL/AGAR DIL: CPT

## 2023-06-01 PROCEDURE — 84145 PROCALCITONIN (PCT): CPT

## 2023-06-01 RX ORDER — SODIUM CHLORIDE, SODIUM LACTATE, POTASSIUM CHLORIDE, CALCIUM CHLORIDE 600; 310; 30; 20 MG/100ML; MG/100ML; MG/100ML; MG/100ML
100 INJECTION, SOLUTION INTRAVENOUS CONTINUOUS
Status: DISCONTINUED | OUTPATIENT
Start: 2023-06-01 | End: 2023-06-02

## 2023-06-01 RX ORDER — POLYETHYLENE GLYCOL 3350 17 G/17G
17 POWDER, FOR SOLUTION ORAL DAILY
Status: DISCONTINUED | OUTPATIENT
Start: 2023-06-01 | End: 2023-06-03 | Stop reason: HOSPADM

## 2023-06-01 RX ORDER — LEVOTHYROXINE SODIUM 0.07 MG/1
75 TABLET ORAL
Status: DISCONTINUED | OUTPATIENT
Start: 2023-06-01 | End: 2023-06-03 | Stop reason: HOSPADM

## 2023-06-01 RX ORDER — ROPINIROLE 0.25 MG/1
0.5 TABLET, FILM COATED ORAL 3 TIMES DAILY
Status: DISCONTINUED | OUTPATIENT
Start: 2023-06-01 | End: 2023-06-01

## 2023-06-01 RX ORDER — ROPINIROLE 2 MG/1
2 TABLET, FILM COATED ORAL ONCE
Status: COMPLETED | OUTPATIENT
Start: 2023-06-01 | End: 2023-06-01

## 2023-06-01 RX ORDER — METHOCARBAMOL 500 MG/1
500 TABLET, FILM COATED ORAL DAILY PRN
Status: DISCONTINUED | OUTPATIENT
Start: 2023-06-01 | End: 2023-06-01

## 2023-06-01 RX ORDER — METHOCARBAMOL 500 MG/1
500 TABLET, FILM COATED ORAL EVERY 6 HOURS PRN
Status: DISCONTINUED | OUTPATIENT
Start: 2023-06-01 | End: 2023-06-02

## 2023-06-01 RX ORDER — ACETAMINOPHEN 325 MG/1
650 TABLET ORAL EVERY 6 HOURS PRN
Status: DISCONTINUED | OUTPATIENT
Start: 2023-06-01 | End: 2023-06-02

## 2023-06-01 RX ORDER — ROPINIROLE 1 MG/1
2 TABLET, FILM COATED ORAL
Status: DISCONTINUED | OUTPATIENT
Start: 2023-06-01 | End: 2023-06-03 | Stop reason: HOSPADM

## 2023-06-01 RX ORDER — HYDROXYCHLOROQUINE SULFATE 200 MG/1
100 TABLET, FILM COATED ORAL EVERY EVENING
Status: DISCONTINUED | OUTPATIENT
Start: 2023-06-01 | End: 2023-06-03 | Stop reason: HOSPADM

## 2023-06-01 RX ORDER — HYDROXYCHLOROQUINE SULFATE 200 MG/1
200 TABLET, FILM COATED ORAL
Status: DISCONTINUED | OUTPATIENT
Start: 2023-06-01 | End: 2023-06-03 | Stop reason: HOSPADM

## 2023-06-01 RX ORDER — MAGNESIUM SULFATE 1 G/100ML
1 INJECTION INTRAVENOUS ONCE
Status: COMPLETED | OUTPATIENT
Start: 2023-06-01 | End: 2023-06-02

## 2023-06-01 RX ORDER — LANOLIN ALCOHOL/MO/W.PET/CERES
3 CREAM (GRAM) TOPICAL
Status: DISCONTINUED | OUTPATIENT
Start: 2023-06-01 | End: 2023-06-03 | Stop reason: HOSPADM

## 2023-06-01 RX ORDER — FOLIC ACID 1 MG/1
2 TABLET ORAL DAILY
Status: DISCONTINUED | OUTPATIENT
Start: 2023-06-01 | End: 2023-06-03 | Stop reason: HOSPADM

## 2023-06-01 RX ORDER — AMOXICILLIN 250 MG
1 CAPSULE ORAL 2 TIMES DAILY
Status: DISCONTINUED | OUTPATIENT
Start: 2023-06-01 | End: 2023-06-03 | Stop reason: HOSPADM

## 2023-06-01 RX ORDER — ACETAMINOPHEN 325 MG/1
650 TABLET ORAL EVERY 6 HOURS PRN
Status: DISCONTINUED | OUTPATIENT
Start: 2023-06-01 | End: 2023-06-01

## 2023-06-01 RX ORDER — LIDOCAINE 40 MG/G
CREAM TOPICAL DAILY PRN
Status: DISCONTINUED | OUTPATIENT
Start: 2023-06-01 | End: 2023-06-03 | Stop reason: HOSPADM

## 2023-06-01 RX ORDER — LIDOCAINE 40 MG/G
CREAM TOPICAL DAILY PRN
Status: DISCONTINUED | OUTPATIENT
Start: 2023-06-01 | End: 2023-06-01

## 2023-06-01 RX ORDER — ACETAMINOPHEN 160 MG/5ML
1 SUSPENSION, ORAL (FINAL DOSE FORM) ORAL ONCE
Status: COMPLETED | OUTPATIENT
Start: 2023-06-01 | End: 2023-06-01

## 2023-06-01 RX ORDER — GABAPENTIN 100 MG/1
200 CAPSULE ORAL 3 TIMES DAILY
Status: DISCONTINUED | OUTPATIENT
Start: 2023-06-01 | End: 2023-06-01

## 2023-06-01 RX ORDER — NORTRIPTYLINE HYDROCHLORIDE 50 MG/1
100 CAPSULE ORAL
Status: DISCONTINUED | OUTPATIENT
Start: 2023-06-01 | End: 2023-06-03 | Stop reason: HOSPADM

## 2023-06-01 RX ADMIN — SENNOSIDES AND DOCUSATE SODIUM 1 TABLET: 8.6; 5 TABLET ORAL at 08:39

## 2023-06-01 RX ADMIN — HYDROXYCHLOROQUINE SULFATE 100 MG: 200 TABLET ORAL at 17:55

## 2023-06-01 RX ADMIN — METHOTREXATE 7.5 MG: 2.5 TABLET ORAL at 10:00

## 2023-06-01 RX ADMIN — ROPINIROLE HYDROCHLORIDE 2 MG: 1 TABLET, FILM COATED ORAL at 22:07

## 2023-06-01 RX ADMIN — Medication 2.5 MG: at 16:08

## 2023-06-01 RX ADMIN — CEFTRIAXONE 2000 MG: 2 INJECTION, POWDER, FOR SOLUTION INTRAMUSCULAR; INTRAVENOUS at 05:07

## 2023-06-01 RX ADMIN — MAGNESIUM SULFATE HEPTAHYDRATE 1 G: 1 INJECTION, SOLUTION INTRAVENOUS at 06:25

## 2023-06-01 RX ADMIN — FOLIC ACID 2 MG: 1 TABLET ORAL at 08:39

## 2023-06-01 RX ADMIN — SODIUM CHLORIDE, SODIUM LACTATE, POTASSIUM CHLORIDE, AND CALCIUM CHLORIDE 500 ML: .6; .31; .03; .02 INJECTION, SOLUTION INTRAVENOUS at 04:20

## 2023-06-01 RX ADMIN — METHOCARBAMOL 500 MG: 500 TABLET ORAL at 10:02

## 2023-06-01 RX ADMIN — SODIUM CHLORIDE, SODIUM LACTATE, POTASSIUM CHLORIDE, AND CALCIUM CHLORIDE 100 ML/HR: .6; .31; .03; .02 INJECTION, SOLUTION INTRAVENOUS at 14:23

## 2023-06-01 RX ADMIN — Medication 2.5 MG: at 22:07

## 2023-06-01 RX ADMIN — SENNOSIDES AND DOCUSATE SODIUM 1 TABLET: 8.6; 5 TABLET ORAL at 22:07

## 2023-06-01 RX ADMIN — HYDROXYCHLOROQUINE SULFATE 200 MG: 200 TABLET ORAL at 11:05

## 2023-06-01 RX ADMIN — NORTRIPTYLINE HYDROCHLORIDE 100 MG: 50 CAPSULE ORAL at 22:06

## 2023-06-01 RX ADMIN — MELATONIN 3 MG: at 22:07

## 2023-06-01 RX ADMIN — ROPINIROLE 2 MG: 2 TABLET, FILM COATED ORAL at 03:53

## 2023-06-01 NOTE — ASSESSMENT & PLAN NOTE
· Found to be retaining greater than 900 cc and got straight cathed in the ER  · Also with evidence of UTI  · Note that patient has ambulatory dysfunction at baseline due to her underlying rheumatoid arthritis and has home health for which they come and see her 3 times a week and she works with PT; per review of chart, appears patient was refusing to work with PT over the last week and per PT notes appears patient's ambulation limited by pain and patient failed NSAIDs prior for the pain    Patient was started on tramadol as needed; this may be contributing to her urinary retention and as such we will hold  · Put on urinary retention protocol  · Creatinine within normal limits

## 2023-06-01 NOTE — ASSESSMENT & PLAN NOTE
· Weakness at home as well as a reported fever; EMS reported fever of 100 4 for which patient was given Tylenol and temperature in the ER 98 5  · UA with evidence of pyuria as well as hematuria; noted to be retaining 900 cc of urine and underwent straight catheterization in the ER  · Patient with issues with ambulatory dysfunction at baseline likely due to her underlying rheumatoid arthritis and has home health for which they come and see her 3 times a week and she works with PT; per review of chart, appears patient was refusing to work with PT over the last week and per PT notes appears patient's ambulation limited by pain and failed NSAIDs prior  Patient was started on tramadol as needed this past week which may be contributing to weakness as well as her urinary retention  · Chest x-ray with patient rotated right, evidence of bilateral shoulder replacements, no large effusion or obvious infiltrate per my read awaiting official read  · ER staff tried calling patient's niece Baby Ja as well as 2 other family members to find out more but no one picked up; do note calls made in the early morning hours  · Admit to medicine  Start IV ceftriaxone for UTI  Put on urinary retention protocol  PT/OT  We will hold PTA tramadol  Case management consultation for postacute placement versus ensuring patient has adequate resources at home

## 2023-06-01 NOTE — ED ATTENDING ATTESTATION
6/1/2023  I, Kelsea Webber MD, saw and evaluated the patient  I have discussed the patient with the resident/non-physician practitioner and agree with the resident's/non-physician practitioner's findings, Plan of Care, and MDM as documented in the resident's/non-physician practitioner's note, except where noted  All available labs and Radiology studies were reviewed  I was present for key portions of any procedure(s) performed by the resident/non-physician practitioner and I was immediately available to provide assistance  At this point I agree with the current assessment done in the Emergency Department  I have conducted an independent evaluation of this patient a history and physical is as follows:  Patient with history of peripheral vascular disease, osteoporosis, ambulatory dysfunction, joint replacements, bilateral shoulder dislocations, multiple falls, intracranial bleeding secondary to falls in the past   In the past patient has declined to be placed in facilities, and is living at home with her sisters  Patient is coming in with concern for fevers, chills, and urinary symptoms  Patient has been unable to ambulate for the last week, and has been confined to her chair for at least the last several days  She has been incontinent of urine  The patient has been having shaking and chills  Patient has not had vomiting  She denies diarrhea  Patient has not been following up with her primary care doctor due to transportation issues  Patient was given Tylenol in route for a fever of 100 4  Review of systems is otherwise negative and 12 systems reviewed  On exam the patient is hypertensive  She is slightly tachycardic  She is afebrile here  On HEENT exam, she has no signs of trauma to the head or neck  She is very frail  Her mucous membranes are dry  Her neck is supple and nontender  The patient's heart is regular without murmurs, rubs, or gallops    Her lungs are clear bilaterally with good air movement  The patient's abdomen is scaphoid, no masses, rebound, or guarding  Her extremities are intact except for a small lesion on the plantar aspect of her left foot  There is no evidence of infection there, there is no erythema  She has decreased capillary refill in her legs, but does have adequate pulses  She has no skin breakdown on her backside, normal genitalia    MEDICAL DECISION MAKING    Number and Complexity of Problems  • Differential diagnosis: Sepsis, UTI, ambulatory dysfunction, electrolyte abnormality, renal failure, cardiac event    Medical Decision Making Data  • External documents reviewed: Patient's prior visits reviewed including social work notes  • My EKG interpretation: No acute ischemia or ectopy  • My CT interpretation:   • My X-ray interpretation:   • My ultrasound interpretation:     XR chest portable    (Results Pending)       Labs Reviewed   CBC AND DIFFERENTIAL - Abnormal       Result Value Ref Range Status    WBC 8 44  4 31 - 10 16 Thousand/uL Final    RBC 3 80 (*) 3 81 - 5 12 Million/uL Final    Hemoglobin 10 2 (*) 11 5 - 15 4 g/dL Final    Hematocrit 32 6 (*) 34 8 - 46 1 % Final    MCV 86  82 - 98 fL Final    MCH 26 8  26 8 - 34 3 pg Final    MCHC 31 3 (*) 31 4 - 37 4 g/dL Final    RDW 15 9 (*) 11 6 - 15 1 % Final    MPV 8 2 (*) 8 9 - 12 7 fL Final    Platelets 859  400 - 390 Thousands/uL Final    nRBC 0  /100 WBCs Final    Neutrophils Relative 72  43 - 75 % Final    Immat GRANS % 0  0 - 2 % Final    Lymphocytes Relative 15  14 - 44 % Final    Monocytes Relative 12  4 - 12 % Final    Eosinophils Relative 1  0 - 6 % Final    Basophils Relative 0  0 - 1 % Final    Neutrophils Absolute 6 03  1 85 - 7 62 Thousands/µL Final    Immature Grans Absolute 0 03  0 00 - 0 20 Thousand/uL Final    Lymphocytes Absolute 1 24  0 60 - 4 47 Thousands/µL Final    Monocytes Absolute 1 02  0 17 - 1 22 Thousand/µL Final    Eosinophils Absolute 0 10  0 00 - 0 61 Thousand/µL Final Basophils Absolute 0 02  0 00 - 0 10 Thousands/µL Final   COMPREHENSIVE METABOLIC PANEL - Abnormal    Sodium 137  135 - 147 mmol/L Final    Potassium 3 5  3 5 - 5 3 mmol/L Final    Chloride 106  96 - 108 mmol/L Final    CO2 25  21 - 32 mmol/L Final    ANION GAP 6  4 - 13 mmol/L Final    BUN 11  5 - 25 mg/dL Final    Creatinine 0 56 (*) 0 60 - 1 30 mg/dL Final    Comment: Standardized to IDMS reference method    Glucose 113  65 - 140 mg/dL Final    Comment: If the patient is fasting, the ADA then defines impaired fasting glucose as > 100 mg/dL and diabetes as > or equal to 123 mg/dL  Specimen collection should occur prior to Sulfasalazine administration due to the potential for falsely depressed results  Specimen collection should occur prior to Sulfapyridine administration due to the potential for falsely elevated results  Calcium 9 0  8 3 - 10 1 mg/dL Final    Corrected Calcium 9 7  8 3 - 10 1 mg/dL Final    AST 43  5 - 45 U/L Final    Comment: Specimen collection should occur prior to Sulfasalazine administration due to the potential for falsely depressed results  ALT 22  12 - 78 U/L Final    Comment: Specimen collection should occur prior to Sulfasalazine and/or Sulfapyridine administration due to the potential for falsely depressed results  Alkaline Phosphatase 125 (*) 46 - 116 U/L Final    Total Protein 7 1  6 4 - 8 4 g/dL Final    Albumin 3 1 (*) 3 5 - 5 0 g/dL Final    Total Bilirubin 0 30  0 20 - 1 00 mg/dL Final    Comment: Use of this assay is not recommended for patients undergoing treatment with eltrombopag due to the potential for falsely elevated results      eGFR 89  ml/min/1 73sq m Final    Narrative:     [place] guidelines for Chronic Kidney Disease (CKD):   •  Stage 1 with normal or high GFR (GFR > 90 mL/min/1 73 square meters)  •  Stage 2 Mild CKD (GFR = 60-89 mL/min/1 73 square meters)  •  Stage 3A Moderate CKD (GFR = 45-59 mL/min/1 73 square meters)  • Stage 3B Moderate CKD (GFR = 30-44 mL/min/1 73 square meters)  •  Stage 4 Severe CKD (GFR = 15-29 mL/min/1 73 square meters)  •  Stage 5 End Stage CKD (GFR <15 mL/min/1 73 square meters)  Note: GFR calculation is accurate only with a steady state creatinine   BLOOD CULTURE   BLOOD CULTURE   COVID19, INFLUENZA A/B, RSV PCR, [facility]   LACTIC ACID, PLASMA (W/REFLEX IF RESULT > 2 0)   PROCALCITONIN TEST   PROTIME-INR   APTT   UA W REFLEX TO MICROSCOPIC WITH REFLEX TO CULTURE       • Labs reviewed by me are significant for: No renal failure, no acidosis    • Clinical decision rules/scores are significant for:     • Discussed case with:   • Considered admission for: Ambulatory dysfunction, rigors, fever    Treatment and Disposition  ED course: IV access established, patient given IV fluids, sepsis evaluation initiated  No fever here    Shared decision making:   Code status:   ED Course         Critical Care Time  Procedures

## 2023-06-01 NOTE — ASSESSMENT & PLAN NOTE
· Continue PTA Plaquenil 200 mg in the morning and 100 mg in the evening  · Continue PTA methotrexate 7 5 mg once weekly on Thursday

## 2023-06-01 NOTE — ASSESSMENT & PLAN NOTE
· Temperature of reported 100 4 at home and given Tylenol; temperature in the ER 98 5  · No leukocytosis, procalcitonin within normal limits, lactic acid 2 0  · UA with nitrate positive, innumerable WBCs, innumerable RBCs  · Culture is pending   · Noted to be retaining 900 cc of urine for which patient underwent straight catheterization  · Noted to have recently been trialed on tramadol  · Started IV ceftriaxone  · Day 1 of abx

## 2023-06-01 NOTE — ED PROVIDER NOTES
History  Chief Complaint   Patient presents with   • Medical Problem     Pt coming from home, per ems pt has symptoms of uti  Pt reports fevers, chills, and shaking  Per ems pt was 104, given tylenol pta  69-year-old female is presenting from home via EMS with concerns of fevers, chills, rigors, and urinary symptoms  EMS reports that they were told by patient's family members that she has not been ambulatory for the last week and has been confined to her chair for at least 2 to 3 days now  She has been incontinent of urine for several days as well and her urine is reported to be foul-smelling  Patient has a history of peripheral vascular disease, right bundle branch block, multiple musculoskeletal problems including osteoporosis, multiple joint replacements and joint fractures, and history of multiple falls  I see from outpatient medical record review that just recently the patient has not been ambulating much at all and has been unable to make it to her primary care appointments  Patient on multiple occasions has reportedly refused transport to the hospital when concerned relatives call  EMS reports that patient had a temperature of 100 4 on the ambulance (this is contradictory to the triage note above)  Patient was administered 650 mg of acetaminophen in route  Patient was reported to have noticeable rigors upon EMS arrival           Prior to Admission Medications   Prescriptions Last Dose Informant Patient Reported? Taking?    Cholecalciferol 50 MCG (2000 UT) TBDP   No No   Sig: Take 1 tablet (2,000 Units total) by mouth daily   Multiple Vitamins-Minerals (CENTRUM SILVER PO)  Family Member Yes No   Sig: Take 1 tablet by mouth daily   acetaminophen (TYLENOL) 650 mg CR tablet  Family Member No No   Sig: Take 2 tablets (1,300 mg total) by mouth 3 (three) times a day   Patient not taking: Reported on 4/07/2441   folic acid (FOLVITE) 1 mg tablet  Family Member Yes No   Sig: Take 2 mg by mouth daily gabapentin (NEURONTIN) 100 mg capsule   No No   Sig: Take 2 capsules (200 mg total) by mouth 3 (three) times a day   hydroxychloroquine (PLAQUENIL) 200 mg tablet   No No   Sig: Take 1 tablet (200 mg total) by mouth every morning AND 0 5 tablets (100 mg total) daily at bedtime  1 tab in am 1/2 tab in pm    levothyroxine 75 mcg tablet   No No   Sig: Take 1 tablet (75 mcg total) by mouth daily in the early morning   lidocaine (LMX) 4 % cream  Family Member No No   Sig: Apply topically as needed for mild pain   melatonin 3 mg  Family Member No No   Sig: Take 1 tablet (3 mg total) by mouth daily at bedtime   methocarbamol (ROBAXIN) 500 mg tablet   No No   Sig: Take 1 tablet (500 mg total) by mouth daily as needed for muscle spasms   methotrexate 2 5 MG tablet  Family Member No No   Sig: 3 tablets by mouth once a week on Thursday   nortriptyline (PAMELOR) 50 mg capsule   No No   Sig: Take 2 capsules (100 mg total) by mouth daily at bedtime   polyethylene glycol (MIRALAX) 17 g packet  Family Member No No   Sig: Take 17 g by mouth daily Do not start before April 18, 2023  rOPINIRole (REQUIP) 2 mg tablet   No No   Sig: Take 1 tablet (2 mg total) by mouth daily at bedtime   senna-docusate sodium (SENOKOT S) 8 6-50 mg per tablet  Family Member No No   Sig: Take 1 tablet by mouth 2 (two) times a day   traMADol (Ultram) 50 mg tablet   No No   Sig: Take 1 tablet (50 mg total) by mouth 2 (two) times a day as needed for moderate pain      Facility-Administered Medications: None       Past Medical History:   Diagnosis Date   • Acute blood loss anemia 9/9/2020   • Aftercare following joint replacement 9/9/2020    Patient had right reversed total shoulder arthroplasty   Pain was controlled when he left the hospital      • Anxiety    • Arthritis    • Cellulitis of left lower extremity 11/30/2019   • Depression    • Disease of thyroid gland     HYPO   • Dyspepsia 11/12/2019   • Dysphagia 2/6/2023   • Femur neck fracture (HCC)    • GERD (gastroesophageal reflux disease)    • Hyperthyroidism     RESOLVED: [id number]   • Hyponatremia 7/25/2022   • Leg pain 2/6/2023   • Osteoporosis    • Polio    • PVD (peripheral vascular disease) ([facility] [address] )    • Right BBB/left ant fasc block    • Shoulder pain, bilateral 7/27/2021   • UTI (urinary tract infection)    • Varicella infection     LAST ASSESSED: [id number]       Past Surgical History:   Procedure Laterality Date   • APPENDECTOMY     • HIP ARTHROPLASTY Left 11/27/2017    Procedure: REMOVAL IM NAIL CONVERSION TO TOTAL HIP ARTHROPLASTY POSTERIOR APPROACH;  Surgeon: Jocelyn Mares MD;  [facility];  Service: Orthopedics   • HIP FRACTURE SURGERY     • HIP SURGERY      both hips replaced;2013 left ,2014 right   • JOINT REPLACEMENT Right     HIP TOTAL   • ID ARTHROPLASTY GLENOHUMERAL JOINT TOTAL SHOULDER Right 9/2/2020    Procedure: ARTHROPLASTY SHOULDER REVERSE;  Surgeon: David Chavarria MD;  [facility];  Service: Orthopedics   • ID ARTHROPLASTY GLENOHUMERAL JOINT TOTAL SHOULDER Left 6/1/2021    Procedure: ARTHROPLASTY SHOULDER REVERSE;  Surgeon: David Chavarria MD;  [facility];  Service: Orthopedics   • ID CONV PREV HIP TOT HIP ARTHRP W/WO AGRFT/ALGRFT Left 10/4/2017    Procedure: Juana Mcburney removal of left hip;  Surgeon: Jocelyn Mares MD;  [facility];  Service: Orthopedics   • ID [id number] WRST SURG W/RLS TRANSVRS CARPL LIGM Right 5/24/2022    Procedure: RELEASE CARPAL TUNNEL ENDOSCOPIC-right;  Surgeon: Codie Grijalva MD;  [facility];  Service: Orthopedics   • REVISION TOTAL HIP ARTHROPLASTY Right    • TOE AMPUTATION Left 7/24/2022    Procedure: 5TH METATARSAL RESECTION WITH BOTTOM FLAP CLOSURE;  Surgeon: Suzanne Alcala DPM;  Location[facility];  Service: Podiatry   • TONSILLECTOMY         Family History   Problem Relation Age of Onset   • Rheum arthritis Mother    • Rheum arthritis Sister    • Prostate cancer Brother      I have reviewed and agree with the history as documented  E-Cigarette/Vaping   • E-Cigarette Use Never User      E-Cigarette/Vaping Substances   • Nicotine No    • THC No    • CBD No    • Flavoring No    • Other No    • Unknown No      Social History     Tobacco Use   • Smoking status: Former   • Smokeless tobacco: Never   • Tobacco comments:     quit 20-30 years ago   Vaping Use   • Vaping Use: Never used   Substance Use Topics   • Alcohol use: Not Currently   • Drug use: Not Currently        Review of Systems   Constitutional: Positive for fatigue  Negative for chills and fever  HENT: Negative for ear pain and sore throat  Eyes: Negative for pain and visual disturbance  Respiratory: Negative for cough and shortness of breath  Cardiovascular: Negative for chest pain and palpitations  Gastrointestinal: Negative for abdominal pain and vomiting  Genitourinary: Positive for difficulty urinating, frequency and urgency  Negative for dysuria and hematuria  Musculoskeletal: Positive for back pain (chronic) and gait problem  Negative for arthralgias  Skin: Negative for color change and rash  Neurological: Positive for weakness  Negative for seizures and syncope  All other systems reviewed and are negative  Physical Exam  ED Triage Vitals [06/01/23 0025]   Temperature Pulse Respirations Blood Pressure SpO2   98 5 °F (36 9 °C) 104 18 (!) 202/105 97 %      Temp Source Heart Rate Source Patient Position - Orthostatic VS BP Location FiO2 (%)   Rectal Monitor Lying Right arm --      Pain Score       --             Orthostatic Vital Signs  Vitals:    06/01/23 0025 06/01/23 0100 06/01/23 0200 06/01/23 0300   BP: (!) 202/105 (!) 211/92 149/64 126/56   Pulse: 104 (!) 106 96 86   Patient Position - Orthostatic VS: Lying Lying Lying Lying       Physical Exam  Vitals and nursing note reviewed  Constitutional:       General: She is not in acute distress  Appearance: She is well-developed  She is ill-appearing        Comments: Frail appearing     HENT:      Head: Normocephalic and atraumatic  Right Ear: External ear normal       Left Ear: External ear normal       Nose: Nose normal  No congestion or rhinorrhea  Mouth/Throat:      Mouth: Mucous membranes are dry  Pharynx: Oropharynx is clear  No oropharyngeal exudate or posterior oropharyngeal erythema  Eyes:      General: No scleral icterus  Extraocular Movements: Extraocular movements intact  Conjunctiva/sclera: Conjunctivae normal       Pupils: Pupils are equal, round, and reactive to light  Cardiovascular:      Rate and Rhythm: Regular rhythm  Tachycardia present  Pulses: Normal pulses  Heart sounds: Normal heart sounds  No murmur heard  Pulmonary:      Effort: Pulmonary effort is normal  No respiratory distress  Breath sounds: Normal breath sounds  No wheezing or rhonchi  Abdominal:      General: Abdomen is flat  There is distension (Lower)  Palpations: Abdomen is soft  There is mass (Ventral hernia)  Tenderness: There is no abdominal tenderness  There is no right CVA tenderness, left CVA tenderness or guarding  Hernia: A hernia (Easily reducible) is present  Genitourinary:     General: Normal vulva  Rectum: Normal    Musculoskeletal:         General: Tenderness (L shoulder) present  No swelling  Cervical back: Neck supple  No rigidity  Right lower leg: No edema  Left lower leg: No edema  Lymphadenopathy:      Cervical: No cervical adenopathy  Skin:     General: Skin is warm and dry  Capillary Refill: Capillary refill takes 2 to 3 seconds  Coloration: Skin is pale  Skin is not jaundiced  Findings: No bruising or rash  Neurological:      General: No focal deficit present  Mental Status: She is alert  Mental status is at baseline     Psychiatric:         Mood and Affect: Mood normal          Behavior: Behavior normal          ED Medications  Medications   rOPINIRole (REQUIP) tablet 2 mg (has no administration in time range)   cefTRIAXone (ROCEPHIN) 2,000 mg in dextrose 5 % 50 mL IVPB (has no administration in time range)   acetaminophen (FOR EMS ONLY) (TYLENOL) oral suspension 650 mg (0 mg Does not apply Given to EMS 6/1/23 0027)       Diagnostic Studies  Results Reviewed     Procedure Component Value Units Date/Time    Urine Microscopic [[id number]]  (Abnormal) Collected: 06/01/23 0233    Lab Status: Final result Specimen: Urine, Straight Cath Updated: 06/01/23 0316     RBC, UA Innumerable /hpf      WBC, UA Innumerable /hpf      Epithelial Cells Occasional /hpf      Bacteria, UA Moderate /hpf     Urine culture [[id number]] Collected: 06/01/23 0233    Lab Status: In process Specimen: Urine, Straight Cath Updated: 06/01/23 0316    UA w Reflex to Microscopic w Reflex to Culture [[id number]]  (Abnormal) Collected: 06/01/23 0233    Lab Status: Final result Specimen: Urine, Straight Cath Updated: 06/01/23 0314     Color, UA Colorless     Clarity, UA Clear     Specific Gravity, UA 1 007     pH, UA 7 5     Leukocytes, UA Large     Nitrite, UA Positive     Protein, UA Negative mg/dl      Glucose, UA Negative mg/dl      Ketones, UA Negative mg/dl      Urobilinogen, UA <2 0 mg/dl      Bilirubin, UA Negative     Occult Blood, UA Moderate    Procalcitonin [[id number]]  (Normal) Collected: 06/01/23 0059    Lab Status: Final result Specimen: Blood from Arm, Left Updated: 06/01/23 0224     Procalcitonin 0 06 ng/ml     FLU/RSV/COVID - if FLU/RSV clinically relevant [[id number]]  (Normal) Collected: 06/01/23 0102    Lab Status: Final result Specimen: Nares from Nose Updated: 06/01/23 0222     SARS-CoV-2 Negative     INFLUENZA A PCR Negative     INFLUENZA B PCR Negative     RSV PCR Negative    Narrative:      FOR PEDIATRIC PATIENTS - copy/paste COVID Guidelines URL to browser: [email or web address]    SARS-CoV-2 assay is a Nucleic Acid Amplification assay intended for the  qualitative detection of nucleic acid from SARS-CoV-2 in nasopharyngeal  swabs  Results are for the presumptive identification of SARS-CoV-2 RNA  Positive results are indicative of infection with SARS-CoV-2, the virus  causing COVID-19, but do not rule out bacterial infection or co-infection  with other viruses  Laboratories within the [facility] and its  territories are required to report all positive results to the appropriate  public health authorities  Negative results do not preclude SARS-CoV-2  infection and should not be used as the sole basis for treatment or other  patient management decisions  Negative results must be combined with  clinical observations, patient history, and epidemiological information  This test has not been FDA cleared or approved  This test has been authorized by FDA under an Emergency Use Authorization  (EUA)  This test is only authorized for the duration of time the  declaration that circumstances exist justifying the authorization of the  emergency use of an in vitro diagnostic tests for detection of SARS-CoV-2  virus and/or diagnosis of COVID-19 infection under section 564(b)(1) of  the Act, 21 U  S C  039ETD-5(T)(6), unless the authorization is terminated  or revoked sooner  The test has been validated but independent review by FDA  and CLIA is pending  Test performed using [employer]: This RT-PCR assay targets N2,  a region unique to SARS-CoV-2  A conserved region in the E-gene was chosen  for pan-Sarbecovirus detection which includes SARS-CoV-2  According to CMS-2020-01-R, this platform meets the definition of high-throughput technology  Lactic acid [[id number]]  (Normal) Collected: 06/01/23 0059    Lab Status: Final result Specimen: Blood from Arm, Left Updated: 06/01/23 0214     LACTIC ACID 2 0 mmol/L     Narrative:      Result may be elevated if tourniquet was used during collection      Augustus Bloch [[id number]]  (Normal) Collected: 06/01/23 0059 Lab Status: Final result Specimen: Blood from Arm, Left Updated: 06/01/23 0155     Protime 13 8 seconds      INR 1 04    APTT [[id number]]  (Normal) Collected: 06/01/23 0059    Lab Status: Final result Specimen: Blood from Arm, Left Updated: 06/01/23 0155     PTT 27 seconds     Blood culture #2 [[id number]] Collected: 06/01/23 0145    Lab Status:  In process Specimen: Blood from Arm, Right Updated: 06/01/23 0150    Comprehensive metabolic panel [[id number]]  (Abnormal) Collected: 06/01/23 0059    Lab Status: Final result Specimen: Blood from Arm, Left Updated: 06/01/23 0137     Sodium 137 mmol/L      Potassium 3 5 mmol/L      Chloride 106 mmol/L      CO2 25 mmol/L      ANION GAP 6 mmol/L      BUN 11 mg/dL      Creatinine 0 56 mg/dL      Glucose 113 mg/dL      Calcium 9 0 mg/dL      Corrected Calcium 9 7 mg/dL      AST 43 U/L      ALT 22 U/L      Alkaline Phosphatase 125 U/L      Total Protein 7 1 g/dL      Albumin 3 1 g/dL      Total Bilirubin 0 30 mg/dL      eGFR 89 ml/min/1 73sq m     Narrative:      [place] guidelines for Chronic Kidney Disease (CKD):   •  Stage 1 with normal or high GFR (GFR > 90 mL/min/1 73 square meters)  •  Stage 2 Mild CKD (GFR = 60-89 mL/min/1 73 square meters)  •  Stage 3A Moderate CKD (GFR = 45-59 mL/min/1 73 square meters)  •  Stage 3B Moderate CKD (GFR = 30-44 mL/min/1 73 square meters)  •  Stage 4 Severe CKD (GFR = 15-29 mL/min/1 73 square meters)  •  Stage 5 End Stage CKD (GFR <15 mL/min/1 73 square meters)  Note: GFR calculation is accurate only with a steady state creatinine    CBC and differential [[id number]]  (Abnormal) Collected: 06/01/23 0059    Lab Status: Final result Specimen: Blood from Arm, Left Updated: 06/01/23 0126     WBC 8 44 Thousand/uL      RBC 3 80 Million/uL      Hemoglobin 10 2 g/dL      Hematocrit 32 6 %      MCV 86 fL      MCH 26 8 pg      MCHC 31 3 g/dL      RDW 15 9 %      MPV 8 2 fL      Platelets 707 Thousands/uL      nRBC 0 /100 WBCs Neutrophils Relative 72 %      Immat GRANS % 0 %      Lymphocytes Relative 15 %      Monocytes Relative 12 %      Eosinophils Relative 1 %      Basophils Relative 0 %      Neutrophils Absolute 6 03 Thousands/µL      Immature Grans Absolute 0 03 Thousand/uL      Lymphocytes Absolute 1 24 Thousands/µL      Monocytes Absolute 1 02 Thousand/µL      Eosinophils Absolute 0 10 Thousand/µL      Basophils Absolute 0 02 Thousands/µL     Blood culture #1 [[id number]] Collected: 06/01/23 0059    Lab Status: In process Specimen: Blood from Arm, Right Updated: 06/01/23 0105                 XR chest portable    (Results Pending)         Procedures  Procedures  ECG interpreted by me  Date: 6/1/2023  Time: 0030  Rate: 106 bpm   Axis: Normal   Rhythm: Regular  Right bundle branch block pattern with widened QRS  Slightly widened from prior EKG from 7/22/2022  No other acute changes seen, interpretation: Abnormal ECG, similar to prior though with widened QRS and worsening right bundle branch block  ED Course                                       Medical Decision Making  Assessment: 39-year-old female coming in with several days of weakness, fatigue, possible fevers, and urinary symptoms including incontinence and frequency  Due to fever on ambulance and tachycardia upon arrival will evaluate with sepsis work-up to include blood cultures, procalcitonin, lactate, and will obtain urine sample with either spontaneous void if patient is able or straight catheterization if patient is unable  Once patient feels that her bladder is empty, postvoid residual will be obtained to determine if patient is retaining  Will give 1 L bolus of crystalloid due to tachycardia to see if fluid responsive  Reassessment/disposition: Patient has grossly infected urine on urinalysis  Reviewed prior sensitivities and treated with ceftriaxone IV due to severity of patient's symptoms    Because of debilitating nature of this infection, advised patient she should be admitted for ongoing treatment to ensure she returns to her prior baseline  Patient is in agreement with this plan, states she is willing to receive IV antibiotics and stay for further treatment  I reached out to internal medicine team who agreed to admit patient for ongoing treatment of her UTI and evaluation of her generalized weakness and fatigue  Amount and/or Complexity of Data Reviewed  Labs: ordered  Radiology: ordered  Risk  Prescription drug management  Decision regarding hospitalization  Disposition  Final diagnoses:   UTI (urinary tract infection)   Weakness   Ambulatory dysfunction   Urinary retention     Time reflects when diagnosis was documented in both MDM as applicable and the Disposition within this note     Time User Action Codes Description Comment    6/1/2023  3:40 AM Lyn Belt Add [N39 0] UTI (urinary tract infection)     6/1/2023  3:40 AM Lyn Belt Add [R53 1] Weakness     6/1/2023  3:40 AM Lyn Belt Add [R26 2] Ambulatory dysfunction     6/1/2023  3:40 AM Lyn Belt Add [R33 9] Urinary retention       ED Disposition     ED Disposition   Admit    Condition   Stable    Date/Time   Thu Jun 1, 2023  3:40 AM    Comment   Case was discussed with Dr Moiz Mendoza and the patient's admission status was agreed to be Admission Status: observation status to the service of Dr Moiz Mendoza   Follow-up Information    None         Patient's Medications   Discharge Prescriptions    No medications on file     No discharge procedures on file  PDMP Review       Value Time User    PDMP Reviewed  Yes 5/25/2023  4:40 PM Orquidea Gomez MD           ED Provider  Attending physically available and evaluated [facility]  I managed the patient along with the ED Attending      Electronically Signed by         Don Hernandes MD  06/01/23 [address] Jennifer Gay MD  06/01/23 3124

## 2023-06-01 NOTE — ASSESSMENT & PLAN NOTE
Malnutrition Findings: BMI 20, cachectic lower extremities, difficulty ambulating                                BMI Findings: There is no height or weight on file to calculate BMI

## 2023-06-01 NOTE — CASE MANAGEMENT
Case Management Assessment & Discharge Planning Note    Patient name Angela Ta  Location 2 215/2 215-02 MRN [id number]  : 1945 Date 2023       Current Admission Date: 2023  Current Admission Diagnosis:Weakness   Patient Active Problem List    Diagnosis Date Noted   • Weakness 2023   • Acute cystitis with hematuria 2023   • Urinary retention 2023   • Immunodeficiency due to drugs ([facility] ) 2023   • Chronic venous hypertension (idiopathic) with ulcer of left lower extremity (CODE) ([facility] ) 2023   • Diabetic ulcer of left midfoot associated with type 2 diabetes mellitus, with bone involvement without evidence of necrosis ([facility] ) 2023   • Other diseases of thymus ([facility] ) 2023   • Protein calorie malnutrition ([place] [address] ) 2023   • Weight loss 2023   • Acute encephalopathy 2023   • Polypharmacy 2023   • Hypercalcemia 2023   • Compression fracture of L5 vertebra with routine healing 2023   • Acute low back pain 2023   • Effusion of right shoulder joint 2023   • Arm bruise, right, initial encounter 2023   • Concern for cerebral contusion 2023   • 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[facility] ) 10/14/2022   • Non-healing open wound of heel, initial encounter 10/12/2022   • Anemia 2022   • Pressure injury of left foot, stage 4 ([facility] )    • Hyponatremia 2022   • Venous insufficiency 2021   • Encounter for annual wellness exam in Medicare patient 2021   • S/P reverse total shoulder arthroplasty, left 2021   • Rupture long head biceps tendon, left, initial encounter 2021   • Shoulder dislocation 2021   • Depression 2020   • Other forms of systemic lupus erythematosus ([facility] ) 2020   • Vitamin D deficiency 2019   • Chronic pain syndrome 2019   • Rheumatoid arthritis involving multiple sites with positive rheumatoid factor ([facility] ) 2019   • Ambulatory dysfunction 11/13/2018   • Closed compression fracture of L3 lumbar vertebra 11/13/2018   • S/P total hip arthroplasty 11/27/2017   • Anxiety 08/30/2017   • RLS (restless legs syndrome) 08/30/2017   • RBBB 08/23/2017   • Age-related osteoporosis with current pathological fracture with routine healing 04/18/2017   • Hypothyroidism due to Hashimoto's thyroiditis 12/13/2016      LOS (days): 0  Geometric Mean LOS (GMLOS) (days):   Days to GMLOS:     OBJECTIVE:              Current admission status: Observation       Preferred Pharmacy:   [facility] [address] [address] [place] [address]  Phone: [phone] Fax: [facility] [address] [place] [address]  Phone: [phone] Fax: [phone]    Primary Care Provider: Rupert Cantor MD    Primary Insurance: MEDICARE  Secondary Insurance:     ASSESSMENT:  [facility]as Proxies    There are no active Health Care Proxies on file                   Readmission Root Cause  30 Day Readmission: Yes  Who directed you to return to the hospital?: Self  Did you understand whom to contact if you had questions or problems?: Yes  Did you get your prescriptions before you left the hospital?: Refused to provide information (Pt stated she did not remember)  Were you able to get your prescriptions filled when you left the hospital?: Refused to provide information (Pt stated she did not remember)  Did you take your medications as prescribed?: Refused to provide information (Pt stated she did not remember)  Were you able to get to your follow-up appointments?: Yes  During previous admission, was a post-acute recommendation made?: Yes  What post-acute resources were offered?: STR    Patient Information  Admitted from[de-identified] Facility  Mental Status: Alert  During Assessment patient was accompanied by: Not accompanied during assessment  Assessment information provided by[de-identified] Patient  Primary Caregiver: Other (Comment)  Caregiver's Name[de-identified] Pt expressed she has an aide through a home care agency  Caregiver's Relationship to Patient[de-identified] Other (Specify)  Support Systems: Family members, Self, Home care staff  [place] of Residence: [address] [place] [address] do you live in?: [facility] entry access options  Select all that apply : Stairs  Number of steps to enter home : 2  Type of Current Residence: 2 story home  Upon entering residence, is there a bedroom on the main floor (no further steps)?: No  A bedroom is located on the following floor levels of residence (select all that apply):: 2nd Floor (Pt stated her bedroom is on the second floor, but she has been sleeping on the first floor on the recliner)  Upon entering residence, is there a bathroom on the main floor (no further steps)?: Yes  Number of steps to 2nd floor from main floor: One Flight  In the last 12 months, was there a time when you were not able to pay the mortgage or rent on time?: No  In the last 12 months, how many places have you lived?: 1  In the last 12 months, was there a time when you did not have a steady place to sleep or slept in a shelter (including now)?: No  Homeless/housing insecurity resource given?: N/A  Living Arrangements: Lives w/ Extended Family (Lives with her sister)  Is patient a ?: No    Activities of Daily Living Prior to Admission  Functional Status: Total dependent  Completes ADLs independently?: No  Level of ADL dependence: Total Dependent (Pt states she has an aide that comes in 3 times a week to assist with ADL's)  Ambulates independently?: No  Level of ambulatory dependence:  Total Dependent  Does patient use assisted devices?: Yes  Assisted Devices (DME) used: [employer]  Does patient currently own DME?: Yes  What DME does the patient currently own?: [employer]  Does patient have a history of Outpatient Therapy (PT/OT)?: No  Does the patient have a history of Short-Term Rehab?: No  Does patient have a history of HHC?: Yes ([facility] for PT at home)  Does patient currently have [facility]?: No         Patient Information Continued  Income Source: SSI/SSD  Does patient have prescription coverage?: Yes  Within the past 12 months, you worried that your food would run out before you got the money to buy more : Never true  Within the past 12 months, the food you bought just didn't last and you didn't have money to get more : Never true  Food insecurity resource given?: N/A  Does patient receive dialysis treatments?: No  Does patient have a history of substance abuse?: No  Does patient have a history of Mental Health Diagnosis?: No         Means of Transportation  Means of Transport to Appts[de-identified] Family transport (Pt stated her niece transports her to her medical appointments)  In the past 12 months, has lack of transportation kept you from medical appointments or from getting medications?: No  In the past 12 months, has lack of transportation kept you from meetings, work, or from getting things needed for daily living?: No  Was application for public transport provided?: N/A        DISCHARGE DETAILS:    Discharge planning discussed with[de-identified] patient at bedside  Freedom of Choice: Yes     CM contacted family/caregiver?: Yes             Contacts  Patient Contacts: Corinne Khat (niece)  Relationship to Patient[de-identified] Family  Contact Method: Phone  Phone Number: [phone]  Reason/Outcome: Continuity of Care, Emergency Contact, Discharge Planning       CM reviewed d/c planning process including the following: identifying help at home, patient preference for d/c planning needs, Discharge Lounge, [facility] Meds to Bed program, availability of treatment team to discuss questions or concerns patient and/or family may have regarding understanding medications and recognizing signs and symptoms once discharged    CM also encouraged patient to follow up with all recommended appointments after discharge  Patient advised of importance for patient and family to participate in managing patient’s medical well being  Patient/caregiver received discharge checklist   Content reviewed  Patient/caregiver encouraged to participate in discharge plan of care prior to discharge home  Additional Comments: CM introduced herself and role with patient at bedside  Pt expressed she lives with her sister in a story home  Pt stated her bedroom is on the second floor but she has currently been sleeping on the first floor on a recliner  Pt stated she has an aide that comes in 3 times a week to help her with her ADLS, pt does not remeber the name of the home health agency  Pt expressed she ambulates with a walker  Pt reports she also recieved PT 2 tmes a week  CM asked Pt if she was able to contact her niece to find out the name of the agency the pt uses for home health and PT  Pt granted CM permission  CM contacted Pts neice via phone  Pt niece stated she did not remember what home health agency and states ptwas working with [facility] in the past for two weeks  Pt niece stated she is currently not recieving PT in the home  Pt niece asked to be contacted if there are any recommendations made for pt   CM will follow up

## 2023-06-01 NOTE — ASSESSMENT & PLAN NOTE
· Temperature of reported 100 4 at home and given Tylenol; temperature in the ER 98 5  · No leukocytosis, procalcitonin within normal limits, lactic acid 2 0  · UA with nitrate positive, innumerable WBCs, innumerable RBCs  · Noted to be retaining 900 cc of urine for which patient underwent straight catheterization  · Noted to have recently been trialed on tramadol  · Follow-up urine culture results  · Start IV ceftriaxone

## 2023-06-01 NOTE — PLAN OF CARE
Problem: PHYSICAL THERAPY ADULT  Goal: Performs mobility at highest level of function for planned discharge setting  See evaluation for individualized goals  Description: Treatment/Interventions: Functional transfer training, LE strengthening/ROM, Therapeutic exercise, Endurance training, Equipment eval/education, Bed mobility, Patient/family training, Gait training          See flowsheet documentation for full assessment, interventions and recommendations  Note: Prognosis: Fair  Problem List: Decreased strength, Decreased endurance, Impaired balance, Decreased mobility, Decreased coordination, Pain, Decreased cognition, Impaired judgement, Decreased safety awareness, Orthopedic restrictions  Assessment: Pt seen for physical therapy evaluation  Pt is a 66 y/o female w/ history/comorbidities of PVD, R BBB, OP< anxiety, depression, RA, multiple fxs/joint replacements who is now admitted from home w/ fever, chills, urinary incontinence  Dx include acute cystitis, hematuria, and continues to undergo w/u  Due to acute medical issues, unclear medical dx, pain, fall risk, note unstable clinical picture,  PT consulted to assess mobility, d/c needs  Pt presents w/ decreased functional mob, standing and sitting balance, endurance, B LE strength and coordination, barriers at home  Pt will benefit from skilled PT to correct for the above problems  Has had progressive moiblity decline at home w/ falls, and dueto same, recommend rehab at d/c        PT Discharge Recommendation: Post acute rehabilitation services    See flowsheet documentation for full assessment

## 2023-06-01 NOTE — CASE MANAGEMENT
Case Management Discharge Planning Note    Patient name Julio César Lutz  Location 2 215/CW2 215-02 MRN [id number]  : 1945 Date 2023       Current Admission Date: 2023  Current Admission Diagnosis:Acute cystitis with hematuria   Patient Active Problem List    Diagnosis Date Noted   • Weakness 2023   • Acute cystitis with hematuria 2023   • Urinary retention 2023   • Immunodeficiency due to drugs ([facility] ) 2023   • Chronic venous hypertension (idiopathic) with ulcer of left lower extremity (CODE) ([place] [address] ) 2023   • Diabetic ulcer of left midfoot associated with type 2 diabetes mellitus, with bone involvement without evidence of necrosis ([place] [address] ) 2023   • Other diseases of thymus ([facility] ) 2023   • Protein calorie malnutrition ([place] [address] ) 2023   • Weight loss 2023   • Acute encephalopathy 2023   • Polypharmacy 2023   • Hypercalcemia 2023   • Compression fracture of L5 vertebra with routine healing 2023   • Acute low back pain 2023   • Effusion of right shoulder joint 2023   • Arm bruise, right, initial encounter 2023   • Concern for cerebral contusion 2023   • 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[facility] ) 10/14/2022   • Non-healing open wound of heel, initial encounter 10/12/2022   • Anemia 2022   • Pressure injury of left foot, stage 4 ([place] [address] )    • Hyponatremia 2022   • Venous insufficiency 2021   • Encounter for annual wellness exam in Medicare patient 2021   • S/P reverse total shoulder arthroplasty, left 2021   • Rupture long head biceps tendon, left, initial encounter 2021   • Shoulder dislocation 2021   • Depression 2020   • Other forms of systemic lupus erythematosus ([facility] ) 2020   • Vitamin D deficiency 2019   • Chronic pain syndrome 2019   • Rheumatoid arthritis involving multiple sites with positive rheumatoid factor ([facility] ) 2019 • Ambulatory dysfunction 11/13/2018   • Closed compression fracture of L3 lumbar vertebra 11/13/2018   • S/P total hip arthroplasty 11/27/2017   • Anxiety 08/30/2017   • Urinary tract infection without hematuria 08/30/2017   • RLS (restless legs syndrome) 08/30/2017   • RBBB 08/23/2017   • Age-related osteoporosis with current pathological fracture with routine healing 04/18/2017   • Hypothyroidism due to Hashimoto's thyroiditis 12/13/2016      LOS (days): 0  Geometric Mean LOS (GMLOS) (days):   Days to GMLOS:     OBJECTIVE:            Current admission status: Observation   Preferred Pharmacy:   [facility] [address] [place] [address]  Phone: [phone] Fax: [facility], [address]  [address]  [facility] [place] [address]  Phone: [phone] Fax: [phone]    Primary Care Provider: Orquidea Gomez MD    Primary Insurance: MEDICARE  Secondary Insurance:     DISCHARGE DETAILS:    Discharge planning discussed with[de-identified] patient at bedside  Freedom of Choice: No     CM contacted family/caregiver?: No- see comments  Were Treatment Team discharge recommendations reviewed with patient/caregiver?: Yes     Were patient/caregiver advised of the risks associated with not following Treatment Team discharge recommendations?: Yes    Contacts  Patient Contacts: Anant Henderson (niece)  Relationship to Patient[de-identified] Family  Contact Method: Phone  Phone Number: [phone]  Reason/Outcome: Continuity of Care, Emergency Contact, Discharge Planning              Other Referral/Resources/Interventions Provided:  Interventions: Short Term Rehab    Would you like to participate in our [address] service program?  : No - Declined    Treatment Team Recommendation: Short Term Rehab  Discharge Destination Plan[de-identified] Other (TBD)                     Additional Comments: CM discussed PT discharge recommedation with pt at bedside  Pt stated she already did rehab and it was not a good experience for her  CM verbalized understanding  Pt asked what rehab would she have to go too  CM stated she would give her list of rehabs that accept her isnurance, and pt would be able to choose  Pt verbalized understanding and expressed she would have to discuss it with her niece  CM offered to call niece to discuss discharge recommendation  Pt stated she did not want CM to call her niece and woud like to tell her niece herself  CM verbalized understanding  CM will follow up

## 2023-06-01 NOTE — PROGRESS NOTES
[address] [facility]  Progress Note  Name: Roberta Krishna I  MRN: [id number]  Unit/Bed#: CW2 215-02 I Date of Admission: 6/1/2023   Date of Service: 6/1/2023 I Hospital Day: 0    Assessment/Plan   * Acute cystitis with hematuria  Assessment & Plan  · Temperature of reported 100 4 at home and given Tylenol; temperature in the ER 98 5  · No leukocytosis, procalcitonin within normal limits, lactic acid 2 0  · UA with nitrate positive, innumerable WBCs, innumerable RBCs  · Culture is pending   · Noted to be retaining 900 cc of urine for which patient underwent straight catheterization  · Noted to have recently been trialed on tramadol  · Started IV ceftriaxone  · Day 1 of abx    Weakness  Assessment & Plan  · Weakness at home as well as a reported fever; EMS reported fever of 100 4 for which patient was given Tylenol and temperature in the ER 98 5  · UA with evidence of pyuria as well as hematuria; noted to be retaining 900 cc of urine and underwent straight catheterization in the ER  · Patient with issues with ambulatory dysfunction at baseline likely due to her underlying rheumatoid arthritis and has home health for which they come and see her 3 times a week and she works with PT; per review of chart, appears patient was refusing to work with PT over the last week and per PT notes appears patient's ambulation limited by pain and failed NSAIDs prior  Patient was started on tramadol as needed this past week which may be contributing to weakness as well as her urinary retention  Holding this now     · Chest x-ray with patient rotated right, evidence of bilateral shoulder replacements, no large effusion or obvious infiltrate per my read awaiting official read  · PT/OT have been consulted   · Encourage OOB  · Hope that she improves with treatment of IVabx and hydration with IVF  · Will also add dietary supplement     Protein calorie malnutrition ([facility] [address] )  Assessment & Plan  Malnutrition Findings: BMI 20, cachectic lower extremities, difficulty ambulating  · Add ensure TID                                BMI Findings: There is no height or weight on file to calculate BMI  Ambulatory dysfunction  Assessment & Plan  · See weakness section above  · Encourage OOB   · Await for PT/OT     Hypothyroidism due to Hashimoto's thyroiditis  Assessment & Plan  · Check TSH and continue PTA levothyroxine    Rheumatoid arthritis involving multiple sites with positive rheumatoid factor (HCC)  Assessment & Plan  · Continue PTA Plaquenil 200 mg in the morning and 100 mg in the evening  · Continue PTA methotrexate 7 5 mg once weekly on Thursday    Urinary retention  Assessment & Plan  · Found to be retaining greater than 900 cc and got straight cathed in the ER  · Also with evidence of UTI  · Note that patient has ambulatory dysfunction at baseline due to her underlying rheumatoid arthritis and has home health for which they come and see her 3 times a week and she works with PT; per review of chart, appears patient was refusing to work with PT over the last week and per PT notes appears patient's ambulation limited by pain and patient failed NSAIDs prior for the pain  Patient was started on tramadol as needed; this may be contributing to her urinary retention and as such we will hold  · Put on urinary retention protocol  · Creatinine within normal limits  · Encourage OOB to bathroom with assistance              VTE Pharmacologic Prophylaxis: VTE Score: 4 Moderate Risk (Score 3-4) - Pharmacological DVT Prophylaxis Ordered: enoxaparin (Lovenox)  Patient Centered Rounds: I performed bedside rounds with nursing staff today  Discussions with Specialists or Other Care Team Provider: Primary RN and CM    Education and Discussions with Family / Patient: Updated  (niece) via phone      Total Time Spent on Date of Encounter in care of patient: 35 minutes This time was spent on one or more of the following: performing physical exam; counseling and coordination of care; obtaining or reviewing history; documenting in the medical record; reviewing/ordering tests, medications or procedures; communicating with other healthcare professionals and discussing with patient's family/caregivers  Current Length of Stay: 0 day(s)  Current Patient Status: Observation   Certification Statement: The patient will continue to require additional inpatient hospital stay due to UTI and weakness   Discharge Plan: Anticipate discharge tomorrow to home  Code Status: Level 1 - Full Code    Subjective:   Offers no complaints of CP or SOB when asked  Primary RN stated that she slept well  Encouraged OOB with assistance  Objective:     Vitals:   Temp (24hrs), Av 5 °F (36 9 °C), Min:98 5 °F (36 9 °C), Max:98 5 °F (36 9 °C)    Temp:  [98 5 °F (36 9 °C)] 98 5 °F (36 9 °C)  HR:  [] 64  Resp:  [17-19] 17  BP: (107-211)/() 107/54  SpO2:  [95 %-99 %] 99 %  There is no height or weight on file to calculate BMI  Input and Output Summary (last 24 hours): Intake/Output Summary (Last 24 hours) at 2023 1241  Last data filed at 2023 0241  Gross per 24 hour   Intake --   Output 900 ml   Net -900 ml       Physical Exam:   Physical Exam  Constitutional:       Appearance: She is ill-appearing  HENT:      Mouth/Throat:      Mouth: Mucous membranes are moist    Cardiovascular:      Rate and Rhythm: Normal rate and regular rhythm  Pulses: Normal pulses  Heart sounds: Normal heart sounds  Pulmonary:      Effort: Pulmonary effort is normal       Breath sounds: Normal breath sounds  Abdominal:      General: Abdomen is flat  Palpations: Abdomen is soft  Musculoskeletal:         General: No swelling  Normal range of motion  Skin:     General: Skin is warm  Capillary Refill: Capillary refill takes less than 2 seconds  Neurological:      General: No focal deficit present        Mental Status: She is alert and oriented to person, place, and time  Psychiatric:         Mood and Affect: Mood normal          Behavior: Behavior normal             Additional Data:     Labs:  Results from last 7 days   Lab Units 06/01/23  0059   EOS PCT % 1   HEMATOCRIT % 32 6*   HEMOGLOBIN g/dL 10 2*   LYMPHS PCT % 15   MONOS PCT % 12   NEUTROS PCT % 72   PLATELETS Thousands/uL 367   WBC Thousand/uL 8 44     Results from last 7 days   Lab Units 06/01/23  0059   ANION GAP mmol/L 6   ALBUMIN g/dL 3 1*   ALK PHOS U/L 125*   ALT U/L 22   AST U/L 43   BUN mg/dL 11   CALCIUM mg/dL 9 0   CHLORIDE mmol/L 106   CO2 mmol/L 25   CREATININE mg/dL 0 56*   GLUCOSE RANDOM mg/dL 113   POTASSIUM mmol/L 3 5   SODIUM mmol/L 137   TOTAL BILIRUBIN mg/dL 0 30     Results from last 7 days   Lab Units 06/01/23  0059   INR  1 04     Results from last 7 days   Lab Units 06/01/23  0852   POC GLUCOSE mg/dl 69         Results from last 7 days   Lab Units 06/01/23  0059   LACTIC ACID mmol/L 2 0   PROCALCITONIN ng/ml 0 06       Lines/Drains:  Invasive Devices     Peripheral Intravenous Line  Duration           Peripheral IV 06/01/23 Left Forearm <1 day          Drain  Duration           External Urinary Catheter <1 day                      Imaging: Reviewed radiology reports from this admission including: chest xray pending but images reviewed     Recent Cultures (last 7 days):   Results from last 7 days   Lab Units 06/01/23  0145 06/01/23  0059   BLOOD CULTURE  Received in Microbiology Lab  Culture in Progress  Received in Microbiology Lab  Culture in Progress         Last 24 Hours Medication List:   Current Facility-Administered Medications   Medication Dose Route Frequency Provider Last Rate   • acetaminophen  650 mg Oral Q6H PRN Marny Necessary, DO     • [START ON 6/2/2023] cefTRIAXone  1,000 mg Intravenous Q24H Marny Necessary, DO     • folic acid  2 mg Oral Daily Marny Necessary, DO     • hydroxychloroquine  100 mg Oral QPM Marny Necessary, DO     • hydroxychloroquine  200 mg Oral Daily With Breakfast Bernadette Santoro, DO     • lactated ringers  100 mL/hr Intravenous Continuous CONNIE Gaspar     • levothyroxine  75 mcg Oral Early Morning Bernadette Santoro, DO     • lidocaine   Topical Daily PRN Bernadette Santoro, DO     • melatonin  3 mg Oral HS Bernadette Santoro, DO     • methocarbamol  500 mg Oral Daily PRN Bernadette Santoro, DO     • methotrexate  7 5 mg Oral Weekly Bernadette Santoro, DO     • nortriptyline  100 mg Oral HS Bernadette Santoro, DO     • polyethylene glycol  17 g Oral Daily Bernadette Santoro, DO     • rOPINIRole  2 mg Oral HS Bernadette Santoro, DO     • senna-docusate sodium  1 tablet Oral BID Bernadette Santoro, DO          Today, Patient Was Seen By: CONNIE Gaspar    **Please Note: This note may have been constructed using a voice recognition system  **

## 2023-06-01 NOTE — PHYSICAL THERAPY NOTE
Physical Therapy Evaluation    Patient's Name: Lv Courser Colon    Admitting Diagnosis  Urinary retention [R33 9]  UTI (urinary tract infection) [N39 0]  Weakness [R53 1]  Ambulatory dysfunction [R26 2]  Known medical problems [Z78 9]    Problem List  Patient Active Problem List   Diagnosis    Anxiety    RLS (restless legs syndrome)    S/P total hip arthroplasty    Hypothyroidism due to Hashimoto's thyroiditis    Age-related osteoporosis with current pathological fracture with routine healing    RBBB    Ambulatory dysfunction    Closed compression fracture of L3 lumbar vertebra    Rheumatoid arthritis involving multiple sites with positive rheumatoid factor ([facility])    Chronic pain syndrome    Vitamin D deficiency    Other forms of systemic lupus erythematosus ([address] )    Depression    Shoulder dislocation    Rupture long head biceps tendon, left, initial encounter    S/P reverse total shoulder arthroplasty, left    Encounter for annual wellness exam in Medicare patient    Venous insufficiency    Hyponatremia    Anemia    Pressure injury of left foot, stage 4 ([facility])    Non-healing open wound of heel, initial encounter    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[facility] [address] )    Concern for cerebral contusion    Arm bruise, right, initial encounter    Effusion of right shoulder joint    Acute low back pain    Compression fracture of L5 vertebra with routine healing    Acute encephalopathy    Polypharmacy    Hypercalcemia    Weight loss    Protein calorie malnutrition (HCC)    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)    Chronic venous hypertension (idiopathic) with ulcer of left lower extremity (CODE) ([address] )    Diabetic ulcer of left midfoot associated with type 2 diabetes mellitus, with bone involvement without evidence of necrosis ([address] )    Other diseases of thymus ([address] )    Weakness    Acute cystitis with hematuria    Urinary retention       Past Medical History  Past Medical History:   Diagnosis Date    Acute blood loss anemia 9/9/2020    Aftercare following joint replacement 9/9/2020    Patient had right reversed total shoulder arthroplasty   Pain was controlled when he left the hospital       Anxiety     Arthritis     Cellulitis of left lower extremity 11/30/2019    Depression     Disease of thyroid gland     HYPO    Dyspepsia 11/12/2019    Dysphagia 2/6/2023    Femur neck fracture (HCC)     GERD (gastroesophageal reflux disease)     Hyperthyroidism     RESOLVED: [id number]    Hyponatremia 7/25/2022    Leg pain 2/6/2023    Osteoporosis     Polio     PVD (peripheral vascular disease) ([facility] [address] )     Right BBB/left ant fasc block     Shoulder pain, bilateral 7/27/2021    UTI (urinary tract infection)     Varicella infection     LAST ASSESSED: [id number]       Past Surgical History  Past Surgical History:   Procedure Laterality Date    APPENDECTOMY      HIP ARTHROPLASTY Left 11/27/2017    Procedure: REMOVAL IM NAIL CONVERSION TO TOTAL HIP ARTHROPLASTY POSTERIOR APPROACH;  Surgeon: Mildred Coates MD;  [facility];  Service: Orthopedics    HIP FRACTURE SURGERY      HIP SURGERY      both hips replaced;2013 left ,2014 right    JOINT REPLACEMENT Right     HIP TOTAL    WA ARTHROPLASTY GLENOHUMERAL JOINT TOTAL SHOULDER Right 9/2/2020    Procedure: ARTHROPLASTY SHOULDER REVERSE;  Surgeon: Mima Wolfe MD;  [facility];  Service: Orthopedics    WA ARTHROPLASTY GLENOHUMERAL JOINT TOTAL SHOULDER Left 6/1/2021    Procedure: ARTHROPLASTY SHOULDER REVERSE;  Surgeon: Mima Wolfe MD;  [facility];  Service: Orthopedics    WA CONV PREV HIP TOT HIP ARTHRP W/WO AGRFT/ALGRFT Left 10/4/2017    Procedure: Hareware removal of left hip;  Surgeon: Mildred Coates MD;  [facility];  Service: Orthopedics    WA [address] [facility] WRST SURG W/RLS TRANSVRS CARPL LIGM Right 5/24/2022    Procedure: RELEASE CARPAL TUNNEL ENDOSCOPIC-right;  Surgeon: Lorie Low MD;  Location: [facility];  Service: [address] [facility] [address] [address] ARTHROPLASTY Right     TOE AMPUTATION Left 7/24/2022    Procedure: 5TH METATARSAL RESECTION WITH BOTTOM FLAP CLOSURE;  Surgeon: Rudy Valadez DPM;  Location: [facility];  Service: Podiatry    TONSILLECTOMY          06/01/23 0905   PT Last Visit   PT Visit Date 06/01/23   Note Type   Note type Evaluation   Pain Assessment   Pain Assessment Tool 0-10   Pain Score 7   Pain Location/Orientation Location: Back; Location: Leg;Orientation: Bilateral   Patient's Stated Pain Goal No pain   Hospital Pain Intervention(s) Ambulation/increased activity   Restrictions/Precautions   Weight Bearing Precautions Per Order No   Other Precautions Cognitive; Chair Alarm; Bed Alarm;Multiple lines;Telemetry; Fall Risk;Pain;O2   Home Living   Type of Home House   Additional Comments Per combination of notes from prior admit, and pt who is lethargic and confused- resides in 2 story home, first floor setup  Sleeps in recliner  Needs ADL assist, ambulates w/ RW  Has not been able to ambulate for several days prior to admit  Was receiving home therapy   Prior Function   Level of [place] Needs assistance with ADLs; Needs assistance with functional mobility   Falls in the last 6 months 1 to 4   General   Family/Caregiver Present No   Cognition   Overall Cognitive Status Impaired   Arousal/Participation Lethargic   Orientation Level Oriented to person;Disoriented to place; Disoriented to time;Disoriented to situation   Memory Unable to assess   Following Commands Follows one step commands inconsistently   Subjective   Subjective confused and lethargic, limited by pain    follows approx 50% of simple commands   RUE Assessment   RUE Assessment (S)    (known chronis R shoulder dislocation/subluxation- was WBAT on prior admits per ortho and can use RW)   RLE Assessment   RLE Assessment   (strength grossly 2/5, unclear if giving full effort due to pain)   LLE Assessment   LLE Assessment   (strength grossly 2/5- unclear if giving full effort due to pain)   Coordination   Movements are Fluid and Coordinated 0   Coordination and Movement Description B LE ataxia   Bed Mobility   Supine to Sit 3  Moderate assistance   Additional items Assist x 2; Increased time required;LE management;Verbal cues; Impulsive   Sit to Supine 3  Moderate assistance   Additional items Assist x 2; Increased time required;LE management;Verbal cues   Additional Comments sat EOB x 5-10 min, focus on tolerance  posterior LOB  maintains w/ min/mod A of 1   Transfers   Sit to Stand 2  Maximal assistance   Additional items Assist x 2; Increased time required; Impulsive;Verbal cues   Stand to Sit 3  Moderate assistance   Additional items Assist x 2; Increased time required; Impulsive;Verbal cues   Additional Comments standing trial x 1 at EOB w/ max A of 2- able to bear approx 50% of wt- barely clears bed,   Balance   Static Sitting Poor +   Dynamic Sitting Poor -   Static Standing Poor -   Dynamic Standing Poor -   Ambulatory Zero   Endurance Deficit   Endurance Deficit Yes   Endurance Deficit Description fatigue, weakness, pain, cog   Activity Tolerance   Activity Tolerance Patient limited by fatigue;Patient limited by pain;Treatment limited secondary to medical complications (Comment)   Nurse Made Aware yes   Assessment   Prognosis Fair   Problem List Decreased strength;Decreased endurance; Impaired balance;Decreased mobility; Decreased coordination;Pain;Decreased cognition; Impaired judgement;Decreased safety awareness;Orthopedic restrictions   Assessment Pt seen for physical therapy evaluation  Pt is a 66 y/o female w/ history/comorbidities of PVD, R BBB, OP< anxiety, depression, RA, multiple fxs/joint replacements who is now admitted from home w/ fever, chills, urinary incontinence  Dx include acute cystitis, hematuria, and continues to undergo w/u  Due to acute medical issues, unclear medical dx, pain, fall risk, note unstable clinical picture,  PT consulted to assess mobility, d/c needs  Pt presents w/ decreased functional mob, standing and sitting balance, endurance, B LE strength and coordination, barriers at home  Pt will benefit from skilled PT to correct for the above problems  Has had progressive moiblity decline at home w/ falls, and dueto same, recommend rehab at d/c   Goals   Patient Goals to have less pain   STG Expiration Date 06/15/23   Short Term Goal #1 1-2 wks: bed mob w  min A, sitting balance to good/normal dynamically, transfers w/ min/mod A of 1, standing balance to fair- w/ device, ambulate 10-50 ft w/ RW and min/mod A of 1, increase B LE strength by 1/2 -1 grade   PT Treatment Day 0   Plan   Treatment/Interventions Functional transfer training;LE strengthening/ROM; Therapeutic exercise; Endurance training;Equipment eval/education; Bed mobility; Patient/family training;Gait training   PT Frequency 2-3x/wk   Recommendation   PT Discharge Recommendation Post acute rehabilitation services   AM-PAC Basic Mobility Inpatient   Turning in Flat Bed Without Bedrails 2   Lying on Back to Sitting on Edge of Flat Bed Without Bedrails 1   Moving Bed to Chair 1   Standing Up From Chair Using Arms 1   Walk in Room 1   Climb 3-5 Stairs With Railing 1   Basic Mobility Inpatient Raw Score 7   Highest Level Of Mobility   JH-HLM Goal 2: Bed activities/Dependent transfer   JH-HLM Achieved 3: Sit at edge of bed           Ngoc Garcia PT, DPT, CSRS

## 2023-06-01 NOTE — ASSESSMENT & PLAN NOTE
· Found to be retaining greater than 900 cc and got straight cathed in the ER  · Also with evidence of UTI  · Note that patient has ambulatory dysfunction at baseline due to her underlying rheumatoid arthritis and has home health for which they come and see her 3 times a week and she works with PT; per review of chart, appears patient was refusing to work with PT over the last week and per PT notes appears patient's ambulation limited by pain and patient failed NSAIDs prior for the pain    Patient was started on tramadol as needed; this may be contributing to her urinary retention and as such we will hold  · Put on urinary retention protocol  · Creatinine within normal limits  · Encourage OOB to bathroom with assistance

## 2023-06-01 NOTE — ASSESSMENT & PLAN NOTE
Malnutrition Findings: BMI 20, cachectic lower extremities, difficulty ambulating  · Add ensure TID                                BMI Findings: There is no height or weight on file to calculate BMI

## 2023-06-01 NOTE — H&P
[address] [facility]  H&P  Name: Lety Zavala 65 y o  female I MRN: [id number]  Unit/Bed#: ED 23 I Date of Admission: 6/1/2023   Date of Service: 6/1/2023 I Hospital Day: 0      Assessment/Plan   * Weakness  Assessment & Plan  · Weakness at home as well as a reported fever; EMS reported fever of 100 4 for which patient was given Tylenol and temperature in the ER 98 5  · UA with evidence of pyuria as well as hematuria; noted to be retaining 900 cc of urine and underwent straight catheterization in the ER  · Patient with issues with ambulatory dysfunction at baseline likely due to her underlying rheumatoid arthritis and has home health for which they come and see her 3 times a week and she works with PT; per review of chart, appears patient was refusing to work with PT over the last week and per PT notes appears patient's ambulation limited by pain and failed NSAIDs prior  Patient was started on tramadol as needed this past week which may be contributing to weakness as well as her urinary retention  · Chest x-ray with patient rotated right, evidence of bilateral shoulder replacements, no large effusion or obvious infiltrate per my read awaiting official read  · ER staff tried calling patient's niece Ольга Kong as well as 2 other family members to find out more but no one picked up; do note calls made in the early morning hours  · Admit to medicine  Start IV ceftriaxone for UTI  Put on urinary retention protocol  PT/OT  We will hold PTA tramadol  Case management consultation for postacute placement versus ensuring patient has adequate resources at home      Acute cystitis with hematuria  Assessment & Plan  · Temperature of reported 100 4 at home and given Tylenol; temperature in the ER 98 5  · No leukocytosis, procalcitonin within normal limits, lactic acid 2 0  · UA with nitrate positive, innumerable WBCs, innumerable RBCs  · Noted to be retaining 900 cc of urine for which patient underwent straight catheterization  · Noted to have recently been trialed on tramadol  · Follow-up urine culture results  · Start IV ceftriaxone    Urinary retention  Assessment & Plan  · Found to be retaining greater than 900 cc and got straight cathed in the ER  · Also with evidence of UTI  · Note that patient has ambulatory dysfunction at baseline due to her underlying rheumatoid arthritis and has home health for which they come and see her 3 times a week and she works with PT; per review of chart, appears patient was refusing to work with PT over the last week and per PT notes appears patient's ambulation limited by pain and patient failed NSAIDs prior for the pain  Patient was started on tramadol as needed; this may be contributing to her urinary retention and as such we will hold  · Put on urinary retention protocol  · Creatinine within normal limits    Protein calorie malnutrition ([facility] [address] )  Assessment & Plan  Malnutrition Findings: BMI 20, cachectic lower extremities, difficulty ambulating                                BMI Findings: There is no height or weight on file to calculate BMI  Rheumatoid arthritis involving multiple sites with positive rheumatoid factor (HCC)  Assessment & Plan  · Continue PTA Plaquenil 200 mg in the morning and 100 mg in the evening  · Continue PTA methotrexate 7 5 mg once weekly on Thursday    Ambulatory dysfunction  Assessment & Plan  · See weakness section above    Hypothyroidism due to Hashimoto's thyroiditis  Assessment & Plan  · Check TSH and continue PTA levothyroxine           VTE Prophylaxis:sequential compression device   Code Status: Level 1 - Full Code       Anticipated Length of Stay:  Patient will be admitted on an Observation basis with an anticipated length of stay of  < 2 midnights  Justification for Hospital Stay: Please see detailed plans noted above      Chief Complaint:     Fever, weakness  History of Present Illness:  Tres Espinoza is a 65 y o  female who "has past medical history significant for rheumatoid arthritis, ambulatory dysfunction, hypothyroidism, protein calorie malnutrition who presented to [facility] ER in the early hours of 6/1 from her home via EMS after EMS was called for reported fever as well as weakness  Per EMS reports to ER, patient's family members had voiced that patient had not been ambulatory for the last week and has been confined to her chair for the last 2 to 3 days as well as with foul-smelling urine  From review of patient's chart, appears PT was trying to work with patient at home where she lives with her niece and other family members but per PT notes appears patient in pain from her underlying rheumatoid arthritis which has prevented her from ambulating  Does appear also that patient was trialed on as needed tramadol this past week  Upon arrival in the ER, patient with reported chills by ER team   ER team reports calling patient's listed contacts in the system but no one picked up  On my exam, patient does endorse weakness as well as difficulty urinating and bilateral feet pains which she reports she \"always has\"  Patient denies any chest pain or shortness of breath or cough        Review of Systems:    Constitutional: Positive for weakness  Eyes:  Denies change in visual acuity   HENT:  Denies nasal congestion or sore throat   Respiratory:  Denies cough or shortness of breath   Cardiovascular:  Denies chest pain or edema   GI:  Denies abdominal pain or bloody stools  : Positive for difficulty urinating  Musculoskeletal: Positive for bilateral feet pain  Integument:  Denies rash   Neurologic:  Denies headache or sensory changes   Endocrine:  Denies polyuria or polydipsia   Lymphatic:  Denies swollen glands   Psychiatric:  Denies depression or anxiety     Past Medical and Surgical History:   Past Medical History:   Diagnosis Date   • Acute blood loss anemia 9/9/2020   • Aftercare following joint replacement 9/9/2020 " Patient had right reversed total shoulder arthroplasty   Pain was controlled when he left the hospital      • Anxiety    • Arthritis    • Cellulitis of left lower extremity 11/30/2019   • Depression    • Disease of thyroid gland     HYPO   • Dyspepsia 11/12/2019   • Dysphagia 2/6/2023   • Femur neck fracture (HCC)    • GERD (gastroesophageal reflux disease)    • Hyperthyroidism     RESOLVED: [id number]   • Hyponatremia 7/25/2022   • Leg pain 2/6/2023   • Osteoporosis    • Polio    • PVD (peripheral vascular disease) ([facility] [address] )    • Right BBB/left ant fasc block    • Shoulder pain, bilateral 7/27/2021   • UTI (urinary tract infection)    • Varicella infection     LAST ASSESSED: [id number]     Past Surgical History:   Procedure Laterality Date   • APPENDECTOMY     • HIP ARTHROPLASTY Left 11/27/2017    Procedure: REMOVAL IM NAIL CONVERSION TO TOTAL HIP ARTHROPLASTY POSTERIOR APPROACH;  Surgeon: Shagufta Reynolds MD;  [facility]: [facility];  Service: Orthopedics   • HIP FRACTURE SURGERY     • HIP SURGERY      both hips replaced;2013 left ,2014 right   • JOINT REPLACEMENT Right     HIP TOTAL   • IA ARTHROPLASTY GLENOHUMERAL JOINT TOTAL SHOULDER Right 9/2/2020    Procedure: ARTHROPLASTY SHOULDER REVERSE;  Surgeon: Pacheco Turner MD;  [facility];  Service: Orthopedics   • IA ARTHROPLASTY GLENOHUMERAL JOINT TOTAL SHOULDER Left 6/1/2021    Procedure: ARTHROPLASTY SHOULDER REVERSE;  Surgeon: Pacheco Turner MD;  [facility];  Service: Orthopedics   • IA CONV PREV HIP TOT HIP ARTHRP W/WO AGRFT/ALGRFT Left 10/4/2017    Procedure: Hareware removal of left hip;  Surgeon: Shagufta Reynolds MD;  Location: [facility];  Service: Orthopedics   • IA [address] [facility] WRST SURG W/RLS TRANSVRS CARPL LIGM Right 5/24/2022    Procedure: RELEASE CARPAL TUNNEL ENDOSCOPIC-right;  Surgeon: Tiarra Kingston MD;  [facility]: [facility];  Service: Orthopedics   • REVISION TOTAL HIP ARTHROPLASTY Right    • TOE AMPUTATION Left 7/24/2022    Procedure: 5TH METATARSAL RESECTION WITH BOTTOM FLAP CLOSURE;  Surgeon: Chandra Spatz, DPM;  Location: [facility];  Service: Podiatry   • TONSILLECTOMY         Meds/Allergies:  (Not in a hospital admission)      Allergies: No Known Allergies    History:  Marital Status:      Substance Use History:   Social History     Substance and Sexual Activity   Alcohol Use Not Currently     Social History     Tobacco Use   Smoking Status Former   Smokeless Tobacco Never   Tobacco Comments    quit 20-30 years ago     Social History     Substance and Sexual Activity   Drug Use Not Currently       Family History:  Family History   Problem Relation Age of Onset   • Rheum arthritis Mother    • Rheum arthritis Sister    • Prostate cancer Brother        Physical Exam:     Vitals:   Blood Pressure: 131/60 (06/01/23 0400)  Pulse: 82 (06/01/23 0400)  Temperature: 98 5 °F (36 9 °C) (06/01/23 0025)  Temp Source: Rectal (06/01/23 0025)  Respirations: 17 (06/01/23 0400)  SpO2: 97 % (06/01/23 0400)    Constitutional:  Somnolent but easily arousable and able to say she's at [facility], years is 2023 and president is eva, Thin  Eyes:  EOMI, No scleral icterus   HENT:   oropharynx moist, external ears normal, external nose normal   Respiratory:  No respiratory distress, no wheezing   Cardiovascular:  Normal rate, no murmurs   GI:  Soft, nondistended, no guarding   :  No costovertebral angle tenderness   Musculoskeletal:  Bony changes of the lower extremities c/w RA; Moves extremities spontaneously without endorsing pain  Integument:  no jaundice  Neurologic:  Somnolent but easily arousable, communicative, CN 2-12 normal,  no focal deficits noted         Lab Results: I have personally reviewed pertinent reports        Results from last 7 days   Lab Units 06/01/23  0059   EOS PCT % 1   HEMATOCRIT % 32 6*   HEMOGLOBIN g/dL 10 2*   LYMPHS PCT % 15   MONOS PCT % 12   NEUTROS PCT % 72   PLATELETS Thousands/uL 367   WBC Thousand/uL 8 44 Results from last 7 days   Lab Units 06/01/23  0059   ALK PHOS U/L 125*   ALT U/L 22   AST U/L 43   BUN mg/dL 11   CALCIUM mg/dL 9 0   CHLORIDE mmol/L 106   CO2 mmol/L 25   CREATININE mg/dL 0 56*   POTASSIUM mmol/L 3 5     Results from last 7 days   Lab Units 06/01/23  0059   INR  1 04         Imaging: I have personally reviewed pertinent reports  No results found  Total time for visit, including counseling/coordination of care: 45 minutes  Greater than 50% of this total time spent on direct patient counseling and coorination of care  Epic Records Reviewed as well as Records in Care Everywhere    ** Please Note: Dragon 360 Dictation voice to text software was used in the creation of this document   **

## 2023-06-01 NOTE — ASSESSMENT & PLAN NOTE
· Weakness at home as well as a reported fever; EMS reported fever of 100 4 for which patient was given Tylenol and temperature in the ER 98 5  · UA with evidence of pyuria as well as hematuria; noted to be retaining 900 cc of urine and underwent straight catheterization in the ER  · Patient with issues with ambulatory dysfunction at baseline likely due to her underlying rheumatoid arthritis and has home health for which they come and see her 3 times a week and she works with PT; per review of chart, appears patient was refusing to work with PT over the last week and per PT notes appears patient's ambulation limited by pain and failed NSAIDs prior  Patient was started on tramadol as needed this past week which may be contributing to weakness as well as her urinary retention  Holding this now     · Chest x-ray with patient rotated right, evidence of bilateral shoulder replacements, no large effusion or obvious infiltrate per my read awaiting official read  · PT/OT have been consulted   · Encourage OOB  · Hope that she improves with treatment of IVabx and hydration with IVF  · Will also add dietary supplement

## 2023-06-02 ENCOUNTER — HOME CARE VISIT (OUTPATIENT)
Dept: HOME HEALTH SERVICES | Facility: HOME HEALTHCARE | Age: 78
End: 2023-06-02

## 2023-06-02 PROBLEM — E83.42 HYPOMAGNESEMIA: Status: ACTIVE | Noted: 2023-06-02

## 2023-06-02 LAB
ANION GAP SERPL CALCULATED.3IONS-SCNC: 6 MMOL/L (ref 4–13)
BASOPHILS # BLD AUTO: 0.01 THOUSANDS/ÂΜL (ref 0–0.1)
BASOPHILS NFR BLD AUTO: 0 % (ref 0–1)
BUN SERPL-MCNC: 8 MG/DL (ref 5–25)
CALCIUM SERPL-MCNC: 7.5 MG/DL (ref 8.3–10.1)
CHLORIDE SERPL-SCNC: 108 MMOL/L (ref 96–108)
CO2 SERPL-SCNC: 22 MMOL/L (ref 21–32)
CREAT SERPL-MCNC: 0.18 MG/DL (ref 0.6–1.3)
EOSINOPHIL # BLD AUTO: 0.12 THOUSAND/ÂΜL (ref 0–0.61)
EOSINOPHIL NFR BLD AUTO: 2 % (ref 0–6)
ERYTHROCYTE [DISTWIDTH] IN BLOOD BY AUTOMATED COUNT: 16.2 % (ref 11.6–15.1)
GFR SERPL CREATININE-BSD FRML MDRD: 130 ML/MIN/1.73SQ M
GLUCOSE SERPL-MCNC: 67 MG/DL (ref 65–140)
GLUCOSE SERPL-MCNC: 85 MG/DL (ref 65–140)
GLUCOSE SERPL-MCNC: 92 MG/DL (ref 65–140)
GLUCOSE SERPL-MCNC: 93 MG/DL (ref 65–140)
GLUCOSE SERPL-MCNC: 93 MG/DL (ref 65–140)
HCT VFR BLD AUTO: 26.7 % (ref 34.8–46.1)
HGB BLD-MCNC: 8.3 G/DL (ref 11.5–15.4)
IMM GRANULOCYTES # BLD AUTO: 0.01 THOUSAND/UL (ref 0–0.2)
IMM GRANULOCYTES NFR BLD AUTO: 0 % (ref 0–2)
LYMPHOCYTES # BLD AUTO: 0.99 THOUSANDS/ÂΜL (ref 0.6–4.47)
LYMPHOCYTES NFR BLD AUTO: 19 % (ref 14–44)
MAGNESIUM SERPL-MCNC: 1.2 MG/DL (ref 1.6–2.6)
MCH RBC QN AUTO: 26.8 PG (ref 26.8–34.3)
MCHC RBC AUTO-ENTMCNC: 31.1 G/DL (ref 31.4–37.4)
MCV RBC AUTO: 86 FL (ref 82–98)
MONOCYTES # BLD AUTO: 0.49 THOUSAND/ÂΜL (ref 0.17–1.22)
MONOCYTES NFR BLD AUTO: 9 % (ref 4–12)
NEUTROPHILS # BLD AUTO: 3.65 THOUSANDS/ÂΜL (ref 1.85–7.62)
NEUTS SEG NFR BLD AUTO: 70 % (ref 43–75)
NRBC BLD AUTO-RTO: 0 /100 WBCS
PLATELET # BLD AUTO: 264 THOUSANDS/UL (ref 149–390)
PMV BLD AUTO: 8.2 FL (ref 8.9–12.7)
POTASSIUM SERPL-SCNC: 3.7 MMOL/L (ref 3.5–5.3)
RBC # BLD AUTO: 3.1 MILLION/UL (ref 3.81–5.12)
SODIUM SERPL-SCNC: 136 MMOL/L (ref 135–147)
WBC # BLD AUTO: 5.27 THOUSAND/UL (ref 4.31–10.16)

## 2023-06-02 PROCEDURE — 85025 COMPLETE CBC W/AUTO DIFF WBC: CPT | Performed by: INTERNAL MEDICINE

## 2023-06-02 PROCEDURE — 82948 REAGENT STRIP/BLOOD GLUCOSE: CPT

## 2023-06-02 PROCEDURE — 99232 SBSQ HOSP IP/OBS MODERATE 35: CPT | Performed by: NURSE PRACTITIONER

## 2023-06-02 PROCEDURE — 83735 ASSAY OF MAGNESIUM: CPT | Performed by: INTERNAL MEDICINE

## 2023-06-02 PROCEDURE — 80048 BASIC METABOLIC PNL TOTAL CA: CPT | Performed by: INTERNAL MEDICINE

## 2023-06-02 RX ORDER — MAGNESIUM SULFATE HEPTAHYDRATE 40 MG/ML
4 INJECTION, SOLUTION INTRAVENOUS ONCE
Status: COMPLETED | OUTPATIENT
Start: 2023-06-02 | End: 2023-06-02

## 2023-06-02 RX ORDER — ACETAMINOPHEN 325 MG/1
975 TABLET ORAL EVERY 8 HOURS SCHEDULED
Status: DISCONTINUED | OUTPATIENT
Start: 2023-06-02 | End: 2023-06-03 | Stop reason: HOSPADM

## 2023-06-02 RX ORDER — METHOCARBAMOL 500 MG/1
500 TABLET, FILM COATED ORAL EVERY 6 HOURS PRN
Status: DISCONTINUED | OUTPATIENT
Start: 2023-06-02 | End: 2023-06-03 | Stop reason: HOSPADM

## 2023-06-02 RX ORDER — ENOXAPARIN SODIUM 100 MG/ML
30 INJECTION SUBCUTANEOUS
Status: DISCONTINUED | OUTPATIENT
Start: 2023-06-02 | End: 2023-06-03 | Stop reason: HOSPADM

## 2023-06-02 RX ORDER — GABAPENTIN 100 MG/1
200 CAPSULE ORAL 3 TIMES DAILY
Status: DISCONTINUED | OUTPATIENT
Start: 2023-06-02 | End: 2023-06-03 | Stop reason: HOSPADM

## 2023-06-02 RX ADMIN — LEVOTHYROXINE SODIUM 75 MCG: 75 TABLET ORAL at 05:09

## 2023-06-02 RX ADMIN — MELATONIN 3 MG: at 21:02

## 2023-06-02 RX ADMIN — SENNOSIDES AND DOCUSATE SODIUM 1 TABLET: 8.6; 5 TABLET ORAL at 16:41

## 2023-06-02 RX ADMIN — ACETAMINOPHEN 975 MG: 325 TABLET ORAL at 09:02

## 2023-06-02 RX ADMIN — SENNOSIDES AND DOCUSATE SODIUM 1 TABLET: 8.6; 5 TABLET ORAL at 09:02

## 2023-06-02 RX ADMIN — ACETAMINOPHEN 975 MG: 325 TABLET ORAL at 14:45

## 2023-06-02 RX ADMIN — ROPINIROLE HYDROCHLORIDE 2 MG: 1 TABLET, FILM COATED ORAL at 21:02

## 2023-06-02 RX ADMIN — FOLIC ACID 2 MG: 1 TABLET ORAL at 09:02

## 2023-06-02 RX ADMIN — NORTRIPTYLINE HYDROCHLORIDE 100 MG: 50 CAPSULE ORAL at 21:54

## 2023-06-02 RX ADMIN — MAGNESIUM SULFATE HEPTAHYDRATE 4 G: 40 INJECTION, SOLUTION INTRAVENOUS at 10:05

## 2023-06-02 RX ADMIN — GABAPENTIN 200 MG: 100 CAPSULE ORAL at 21:02

## 2023-06-02 RX ADMIN — GABAPENTIN 200 MG: 100 CAPSULE ORAL at 16:40

## 2023-06-02 RX ADMIN — CEFTRIAXONE 1000 MG: 1 INJECTION, POWDER, FOR SOLUTION INTRAMUSCULAR; INTRAVENOUS at 05:06

## 2023-06-02 RX ADMIN — Medication 2.5 MG: at 14:45

## 2023-06-02 RX ADMIN — GABAPENTIN 200 MG: 100 CAPSULE ORAL at 09:40

## 2023-06-02 RX ADMIN — METHOCARBAMOL 500 MG: 500 TABLET ORAL at 16:40

## 2023-06-02 RX ADMIN — POLYETHYLENE GLYCOL 3350 17 G: 17 POWDER, FOR SOLUTION ORAL at 09:03

## 2023-06-02 RX ADMIN — ACETAMINOPHEN 975 MG: 325 TABLET ORAL at 21:02

## 2023-06-02 RX ADMIN — HYDROXYCHLOROQUINE SULFATE 200 MG: 200 TABLET ORAL at 13:01

## 2023-06-02 RX ADMIN — HYDROXYCHLOROQUINE SULFATE 100 MG: 200 TABLET ORAL at 18:18

## 2023-06-02 RX ADMIN — ENOXAPARIN SODIUM 30 MG: 30 INJECTION SUBCUTANEOUS at 09:42

## 2023-06-02 RX ADMIN — Medication 2.5 MG: at 09:02

## 2023-06-02 RX ADMIN — Medication 2.5 MG: at 21:09

## 2023-06-02 NOTE — ASSESSMENT & PLAN NOTE
Possibly UTI, UA positive  Did have low grade temp prior to admission of 100 4 but no other SIRS/sepsis criteria  Was noted to have urinary retention on admission as well  · Continue IV Ceftriaxone pending UC, plan to treat for total of 3 days   Day 2 Rx

## 2023-06-02 NOTE — ASSESSMENT & PLAN NOTE
Found to be retaining greater than 900 cc and got straight cathed in the ER  Also with evidence of UTI  Also with recent new start Tramadol prior to admission which could have contributed to urinary retention    · Continue to hold tramadol  · Follow urinary retention protocol, seems to be voiding spontaneously  · Has purewick in place

## 2023-06-02 NOTE — PROGRESS NOTES
Pastoral Care Progress Note    2023  Patient: Ronald Lira : 1945  Admission Date & Time: 2023 0009  MRN: [id number] [facility]: [id number]         23 1500   Clinical Encounter Type   Visited With Patient   Holiness Encounters   Holiness Needs Prayer   Sacramental Encounters   Sacrament of Sick-Anointing Patient declined anointing     Concha Whitehead met with the pt and provided prayers and blessings  The pt declined Fr's offers for anointing  No further needs were expressed at this time  Chaplains still remain available

## 2023-06-02 NOTE — PROGRESS NOTES
[address] [facility]  Progress Note  Name: Ju Marc I  MRN: [id number]  Unit/Bed#: CW2 215-02 I Date of Admission: 6/1/2023   Date of Service: 6/2/2023 I Hospital Day: 0    Assessment/Plan   * Weakness  Assessment & Plan  Weakness at home as well as a reported fever; EMS reported fever of 100 4 for which patient was given Tylenol and temperature in the ER 98 5  · UA positive, treating for UTI  · So noted to have urinary retention on admission  Seems to have resolved, see below  · Patient has ambulatory dysfunction at baseline secondary to her underlying rheumatoid arthritis and has home health and PT    · Recently started on tramadol which also could be contributing  · PT/OT recommending rehab, discussed with patient and niece over phone  Agreeable at this time  Case management notified  Acute cystitis with hematuria  Assessment & Plan  Possibly UTI, UA positive  Did have low grade temp prior to admission of 100 4 but no other SIRS/sepsis criteria  Was noted to have urinary retention on admission as well  · Continue IV Ceftriaxone pending UC, plan to treat for total of 3 days  Day 2 Rx     Urinary retention  Assessment & Plan  Found to be retaining greater than 900 cc and got straight cathed in the ER  Also with evidence of UTI  Also with recent new start Tramadol prior to admission which could have contributed to urinary retention  · Continue to hold tramadol  · Follow urinary retention protocol, seems to be voiding spontaneously  · Has purewick in place    Hypomagnesemia  Assessment & Plan  Mag 1 2    · 4 G IV today    Rheumatoid arthritis involving multiple sites with positive rheumatoid factor (HCC)  Assessment & Plan  · Continue PTA Plaquenil 200 mg in the morning and 100 mg in the evening  · Continue PTA methotrexate 7 5 mg once weekly on Thursday    Hypothyroidism due to Hashimoto's thyroiditis  Assessment & Plan  · TSL WNL  · Continue home levothyroxine    Anemia  Assessment & Plan  · Baseline appx 9-11  8 3 today   · No s/s active bleeding  Possibly dilutional in setting of IVF  · Monitor closely  · CBC in AM    Chronic pain syndrome  Assessment & Plan  · Add ATC APAP  · Continue PRN Oxycodone, low dose  · Continue home gabapentin and pamelor  · Avoid tramadol as may have contributed to urinary retention    Ambulatory dysfunction  Assessment & Plan  · PT/OT recommending rehab  · CM following    Protein calorie malnutrition ([facility] [address] )  Assessment & Plan  Malnutrition Findings: BMI 20, cachectic lower extremities, difficulty ambulating  · Continue ensure TID                                BMI Findings: There is no height or weight on file to calculate BMI  VTE Pharmacologic Prophylaxis: VTE Score: 4 Moderate Risk (Score 3-4) - Pharmacological DVT Prophylaxis Ordered: enoxaparin (Lovenox)  Patient Centered Rounds: I performed bedside rounds with nursing staff today  Discussions with Specialists or Other Care Team Provider: nursing, case management     Education and Discussions with Family / Patient: Updated  (niece) via phone  Total Time Spent on Date of Encounter in care of patient: 35 minutes This time was spent on one or more of the following: performing physical exam; counseling and coordination of care; obtaining or reviewing history; documenting in the medical record; reviewing/ordering tests, medications or procedures; communicating with other healthcare professionals and discussing with patient's family/caregivers  Current Length of Stay: 0 day(s)  Current Patient Status: Inpatient   Certification Statement: The patient, admitted on an observation basis, will now require > 2 midnight hospital stay due to IV abx pending UC, IV mag, monitor hemoglobin and electrolytes, safe discharge planning  Discharge Plan: Anticipate discharge tomorrow to home with home services      Code Status: Level 1 - Full Code    Subjective:   Patient offers no acute complaints  Has been urinating without difficulty, currently has pure wick in place  Has not had BM here in hospital but was not noting any dark stools or jamie blood at home  Denies fever, chills  We discussed rehab, initially she was agreeable however upon later discussion, now refusing  Agreeable for home health and home therapies  Objective:     Vitals:   Temp (24hrs), Av 3 °F (36 8 °C), Min:97 9 °F (36 6 °C), Max:98 7 °F (37 1 °C)    Temp:  [97 9 °F (36 6 °C)-98 7 °F (37 1 °C)] 98 7 °F (37 1 °C)  HR:  [74-76] 74  Resp:  [20] 20  BP: (127-129)/(60-74) 127/60  SpO2:  [98 %] 98 %  There is no height or weight on file to calculate BMI  Input and Output Summary (last 24 hours): Intake/Output Summary (Last 24 hours) at 2023 0855  Last data filed at 2023 0500  Gross per 24 hour   Intake 610 ml   Output 1700 ml   Net -1090 ml       Physical Exam:   Physical Exam  Vitals and nursing note reviewed  Constitutional:       General: She is not in acute distress  Cardiovascular:      Rate and Rhythm: Normal rate  Pulmonary:      Effort: No respiratory distress  Abdominal:      Tenderness: There is no abdominal tenderness  Musculoskeletal:         General: No swelling  Skin:     General: Skin is warm  Neurological:      Mental Status: She is alert and oriented to person, place, and time  Mental status is at baseline     Psychiatric:         Mood and Affect: Mood normal           Additional Data:     Labs:  Results from last 7 days   Lab Units 23  0501   EOS PCT % 2   HEMATOCRIT % 26 7*   HEMOGLOBIN g/dL 8 3*   LYMPHS PCT % 19   MONOS PCT % 9   NEUTROS PCT % 70   PLATELETS Thousands/uL 264   WBC Thousand/uL 5 27     Results from last 7 days   Lab Units 23  0501 23  0059   ANION GAP mmol/L 6 6   ALBUMIN g/dL  --  3 1*   ALK PHOS U/L  --  125*   ALT U/L  --  22   AST U/L  --  43   BUN mg/dL 8 11   CALCIUM mg/dL 7 5* 9 0 CHLORIDE mmol/L 108 106   CO2 mmol/L 22 25   CREATININE mg/dL 0 18* 0 56*   GLUCOSE RANDOM mg/dL 67 113   POTASSIUM mmol/L 3 7 3 5   SODIUM mmol/L 136 137   TOTAL BILIRUBIN mg/dL  --  0 30     Results from last 7 days   Lab Units 06/01/23  0059   INR  1 04     Results from last 7 days   Lab Units 06/02/23  0525 06/01/23  2214 06/01/23  0852   POC GLUCOSE mg/dl 92 124 69         Results from last 7 days   Lab Units 06/01/23  0059   LACTIC ACID mmol/L 2 0   PROCALCITONIN ng/ml 0 06       Lines/Drains:  Invasive Devices     Peripheral Intravenous Line  Duration           Peripheral IV 06/01/23 Left Forearm 1 day          Drain  Duration           External Urinary Catheter 1 day                      Imaging: No pertinent imaging reviewed  Recent Cultures (last 7 days):   Results from last 7 days   Lab Units 06/01/23  0145 06/01/23  0059   BLOOD CULTURE  No Growth at 24 hrs  No Growth at 24 hrs         Last 24 Hours Medication List:   Current Facility-Administered Medications   Medication Dose Route Frequency Provider Last Rate   • acetaminophen  975 mg Oral [facility] Amber Rich Essex, CRNP     • cefTRIAXone  1,000 mg Intravenous Q24H Isac Primas, DO 1,000 mg (06/02/23 0506)   • enoxaparin  30 mg Subcutaneous Q24H [facility] CONNIE Johnson     • folic acid  2 mg Oral Daily Isac Primas, DO     • gabapentin  200 mg Oral TID CONNIE Burrell     • hydroxychloroquine  100 mg Oral QPM Isac Primas, DO     • hydroxychloroquine  200 mg Oral Daily With Breakfast Isac Primas, DO     • levothyroxine  75 mcg Oral Early Morning Isac Primas, DO     • lidocaine   Topical Daily PRN Isac Primas, DO     • magnesium sulfate  4 g Intravenous Once CONNIE Johnson     • melatonin  3 mg Oral HS Isac Primas, DO     • methotrexate  7 5 mg Oral Weekly Isac Primas, DO     • nortriptyline  100 mg Oral HS Isac Primas, DO     • oxyCODONE  2 5 mg Oral Q4H PRN CONNIE Givens     • polyethylene glycol  17 g Oral Daily Gina Contreras, DO     • rOPINIRole  2 mg Oral HS Gina Outalayna, DO     • senna-docusate sodium  1 tablet Oral BID Gina Contreras,           Today, Patient Was Seen By: CONNIE Sanders    **Please Note: This note may have been constructed using a voice recognition system  **

## 2023-06-02 NOTE — CASE MANAGEMENT
Case Management Discharge Planning Note    Patient name Tres Lighter  Location /-01 MRN [id number]  : 1945 Date 2023       Current Admission Date: 2023  Current Admission Diagnosis:Weakness   Patient Active Problem List    Diagnosis Date Noted   • Hypomagnesemia 2023   • Weakness 2023   • Acute cystitis with hematuria 2023   • Urinary retention 2023   • Immunodeficiency due to drugs ([facility] ) 2023   • Chronic venous hypertension (idiopathic) with ulcer of left lower extremity (CODE) ([place] [address] ) 2023   • Diabetic ulcer of left midfoot associated with type 2 diabetes mellitus, with bone involvement without evidence of necrosis ([place] [address] ) 2023   • Other diseases of thymus ([place] [address] ) 2023   • Protein calorie malnutrition ([place] [address] ) 2023   • Weight loss 2023   • Acute encephalopathy 2023   • Polypharmacy 2023   • Hypercalcemia 2023   • Compression fracture of L5 vertebra with routine healing 2023   • Acute low back pain 2023   • Effusion of right shoulder joint 2023   • Arm bruise, right, initial encounter 2023   • Concern for cerebral contusion 2023   • 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[facility] ) 10/14/2022   • Non-healing open wound of heel, initial encounter 10/12/2022   • Anemia 2022   • Pressure injury of left foot, stage 4 ([place] [address] )    • Hyponatremia 2022   • Venous insufficiency 2021   • Encounter for annual wellness exam in Medicare patient 2021   • S/P reverse total shoulder arthroplasty, left 2021   • Rupture long head biceps tendon, left, initial encounter 2021   • Shoulder dislocation 2021   • Depression 2020   • Other forms of systemic lupus erythematosus ([facility] ) 2020   • Vitamin D deficiency 2019   • Chronic pain syndrome 2019   • Rheumatoid arthritis involving multiple sites with positive rheumatoid factor ([facility] ) 03/04/2019   • Ambulatory dysfunction 11/13/2018   • Closed compression fracture of L3 lumbar vertebra 11/13/2018   • S/P total hip arthroplasty 11/27/2017   • Anxiety 08/30/2017   • Urinary tract infection without hematuria 08/30/2017   • RLS (restless legs syndrome) 08/30/2017   • RBBB 08/23/2017   • Age-related osteoporosis with current pathological fracture with routine healing 04/18/2017   • Hypothyroidism due to Hashimoto's thyroiditis 12/13/2016      LOS (days): 0  Geometric Mean LOS (GMLOS) (days): 2 80  Days to GMLOS:2 5     OBJECTIVE:  Risk of Unplanned Readmission Score: 35 53         Current admission status: Inpatient   Preferred Pharmacy:   [address] [address]  Phone: [phone] Fax: [place], [address]  [address] [place] [address]  Phone: [phone] Fax: [phone]    Primary Care Provider: Jcarlos Christiansen MD    Primary Insurance: MEDICARE  Secondary Insurance:     DISCHARGE DETAILS:    Other Referral/Resources/Interventions Provided:  Interventions: [facility]  Referral Comments: Plan for d/c home tomorrow with [facility]  Transport arranged for 11:45AM tomorrow via [employer]  D/c envelope at nurses station  Treatment Team Recommendation: Home with [facility]  Discharge Destination Plan[de-identified] Home with [place] at Discharge : [facility]  Dispatcher Contacted: Yes     Transported by [employer] and Unit #):  SLETS  ETA of Transport (Date): 06/03/23  ETA of Transport (Time): 5037

## 2023-06-02 NOTE — ASSESSMENT & PLAN NOTE
Malnutrition Findings: BMI 20, cachectic lower extremities, difficulty ambulating  · Continue ensure TID                                BMI Findings: There is no height or weight on file to calculate BMI

## 2023-06-02 NOTE — ASSESSMENT & PLAN NOTE
· Add ATC APAP  · Continue PRN Oxycodone, low dose  · Continue home gabapentin and pamelor  · Avoid tramadol as may have contributed to urinary retention

## 2023-06-02 NOTE — ASSESSMENT & PLAN NOTE
· Baseline appx 9-11  8 3 today   · No s/s active bleeding   Possibly dilutional in setting of IVF  · Monitor closely  · CBC in AM

## 2023-06-02 NOTE — ASSESSMENT & PLAN NOTE
Weakness at home as well as a reported fever; EMS reported fever of 100 4 for which patient was given Tylenol and temperature in the ER 98 5  · UA positive, treating for UTI  · So noted to have urinary retention on admission  Seems to have resolved, see below  · Patient has ambulatory dysfunction at baseline secondary to her underlying rheumatoid arthritis and has home health and PT    · Recently started on tramadol which also could be contributing  · PT/OT recommending rehab, discussed with patient and niece over phone  Agreeable at this time  Case management notified

## 2023-06-03 VITALS
TEMPERATURE: 98.1 F | DIASTOLIC BLOOD PRESSURE: 68 MMHG | OXYGEN SATURATION: 98 % | SYSTOLIC BLOOD PRESSURE: 135 MMHG | RESPIRATION RATE: 17 BRPM | HEART RATE: 68 BPM

## 2023-06-03 LAB
ANION GAP SERPL CALCULATED.3IONS-SCNC: 3 MMOL/L (ref 4–13)
BACTERIA UR CULT: ABNORMAL
BASOPHILS # BLD AUTO: 0.02 THOUSANDS/ÂΜL (ref 0–0.1)
BASOPHILS NFR BLD AUTO: 0 % (ref 0–1)
BUN SERPL-MCNC: 9 MG/DL (ref 5–25)
CALCIUM SERPL-MCNC: 8.4 MG/DL (ref 8.3–10.1)
CHLORIDE SERPL-SCNC: 106 MMOL/L (ref 96–108)
CO2 SERPL-SCNC: 29 MMOL/L (ref 21–32)
CREAT SERPL-MCNC: 0.48 MG/DL (ref 0.6–1.3)
EOSINOPHIL # BLD AUTO: 0.17 THOUSAND/ÂΜL (ref 0–0.61)
EOSINOPHIL NFR BLD AUTO: 3 % (ref 0–6)
ERYTHROCYTE [DISTWIDTH] IN BLOOD BY AUTOMATED COUNT: 16.3 % (ref 11.6–15.1)
GFR SERPL CREATININE-BSD FRML MDRD: 94 ML/MIN/1.73SQ M
GLUCOSE SERPL-MCNC: 89 MG/DL (ref 65–140)
GLUCOSE SERPL-MCNC: 90 MG/DL (ref 65–140)
GLUCOSE SERPL-MCNC: 94 MG/DL (ref 65–140)
HCT VFR BLD AUTO: 30.1 % (ref 34.8–46.1)
HGB BLD-MCNC: 9.6 G/DL (ref 11.5–15.4)
IMM GRANULOCYTES # BLD AUTO: 0.03 THOUSAND/UL (ref 0–0.2)
IMM GRANULOCYTES NFR BLD AUTO: 1 % (ref 0–2)
LYMPHOCYTES # BLD AUTO: 1 THOUSANDS/ÂΜL (ref 0.6–4.47)
LYMPHOCYTES NFR BLD AUTO: 20 % (ref 14–44)
MAGNESIUM SERPL-MCNC: 2.5 MG/DL (ref 1.6–2.6)
MCH RBC QN AUTO: 27.4 PG (ref 26.8–34.3)
MCHC RBC AUTO-ENTMCNC: 31.9 G/DL (ref 31.4–37.4)
MCV RBC AUTO: 86 FL (ref 82–98)
MONOCYTES # BLD AUTO: 0.57 THOUSAND/ÂΜL (ref 0.17–1.22)
MONOCYTES NFR BLD AUTO: 11 % (ref 4–12)
NEUTROPHILS # BLD AUTO: 3.2 THOUSANDS/ÂΜL (ref 1.85–7.62)
NEUTS SEG NFR BLD AUTO: 65 % (ref 43–75)
NRBC BLD AUTO-RTO: 0 /100 WBCS
PLATELET # BLD AUTO: 329 THOUSANDS/UL (ref 149–390)
PMV BLD AUTO: 8.4 FL (ref 8.9–12.7)
POTASSIUM SERPL-SCNC: 3.4 MMOL/L (ref 3.5–5.3)
RBC # BLD AUTO: 3.51 MILLION/UL (ref 3.81–5.12)
SODIUM SERPL-SCNC: 138 MMOL/L (ref 135–147)
WBC # BLD AUTO: 4.99 THOUSAND/UL (ref 4.31–10.16)

## 2023-06-03 PROCEDURE — 99239 HOSP IP/OBS DSCHRG MGMT >30: CPT | Performed by: NURSE PRACTITIONER

## 2023-06-03 PROCEDURE — 80048 BASIC METABOLIC PNL TOTAL CA: CPT | Performed by: NURSE PRACTITIONER

## 2023-06-03 PROCEDURE — 82948 REAGENT STRIP/BLOOD GLUCOSE: CPT

## 2023-06-03 PROCEDURE — 83735 ASSAY OF MAGNESIUM: CPT | Performed by: NURSE PRACTITIONER

## 2023-06-03 PROCEDURE — 85025 COMPLETE CBC W/AUTO DIFF WBC: CPT | Performed by: NURSE PRACTITIONER

## 2023-06-03 RX ORDER — OXYCODONE HYDROCHLORIDE 5 MG/1
2.5 TABLET ORAL EVERY 6 HOURS PRN
Qty: 10 TABLET | Refills: 0 | Status: SHIPPED | OUTPATIENT
Start: 2023-06-03

## 2023-06-03 RX ORDER — POTASSIUM CHLORIDE 20 MEQ/1
40 TABLET, EXTENDED RELEASE ORAL ONCE
Status: COMPLETED | OUTPATIENT
Start: 2023-06-03 | End: 2023-06-03

## 2023-06-03 RX ADMIN — HYDROXYCHLOROQUINE SULFATE 200 MG: 200 TABLET ORAL at 10:35

## 2023-06-03 RX ADMIN — ENOXAPARIN SODIUM 30 MG: 30 INJECTION SUBCUTANEOUS at 08:36

## 2023-06-03 RX ADMIN — FOLIC ACID 2 MG: 1 TABLET ORAL at 08:36

## 2023-06-03 RX ADMIN — LEVOTHYROXINE SODIUM 75 MCG: 75 TABLET ORAL at 05:26

## 2023-06-03 RX ADMIN — Medication 2.5 MG: at 05:33

## 2023-06-03 RX ADMIN — POLYETHYLENE GLYCOL 3350 17 G: 17 POWDER, FOR SOLUTION ORAL at 08:36

## 2023-06-03 RX ADMIN — ACETAMINOPHEN 975 MG: 325 TABLET ORAL at 05:26

## 2023-06-03 RX ADMIN — GABAPENTIN 200 MG: 100 CAPSULE ORAL at 08:36

## 2023-06-03 RX ADMIN — CEFTRIAXONE 1000 MG: 1 INJECTION, POWDER, FOR SOLUTION INTRAMUSCULAR; INTRAVENOUS at 04:42

## 2023-06-03 RX ADMIN — POTASSIUM CHLORIDE 40 MEQ: 1500 TABLET, EXTENDED RELEASE ORAL at 08:36

## 2023-06-03 RX ADMIN — METHOCARBAMOL 500 MG: 500 TABLET ORAL at 08:36

## 2023-06-03 RX ADMIN — SENNOSIDES AND DOCUSATE SODIUM 1 TABLET: 8.6; 5 TABLET ORAL at 08:36

## 2023-06-03 RX ADMIN — Medication 2.5 MG: at 10:35

## 2023-06-03 NOTE — DISCHARGE SUMMARY
Discharge Summary - [facility]    Patient Information: Greg Robledo Colon 65 y o  female MRN: [id number]  Unit/Bed#: -01 Encounter: [id number]    Discharging Physician / Practitioner: CONNIE Bridges  PCP: Martha Cuello MD  Admission Date: 6/1/2023  Discharge Date: 06/03/23    Reason for Admission: weakness, UTI, urinary retention     Discharge Diagnoses:     Principal Problem:    Weakness  Active Problems:    Acute cystitis with hematuria    Urinary retention    Rheumatoid arthritis involving multiple sites with positive rheumatoid factor ([facility] )    Hypomagnesemia    Hypothyroidism due to Hashimoto's thyroiditis    Ambulatory dysfunction    Chronic pain syndrome    Anemia    Protein calorie malnutrition ([facility] )  Resolved Problems:    * No resolved hospital problems  *      Consultations During Hospital Stay:  · PT/OT  · Case management    Procedures Performed:     · none    Significant Findings / Test Results:     · CXR negative  · UA positive  · Urine culture > 100,000 proteus mirabilis   · Cultures negative at 48 hours    Incidental Findings:   · none     Test Results Pending at Discharge (will require follow up):   · susceptibilities from urine culture  · Endpoints of blood cultures     Outpatient Tests Requested:  · Outpatient f/u with PCP    Complications:  None     Hospital Course:     Desirae Rucker is a 65 y o  female patient with a past medical history of chronic pain, rheumatoid arthritis, protein calorie malnutrition, hypothyroidism who originally presented to the hospital on 6/1/2023 due to weakness at home as well as reported fever of 100 4   UA positive on admission, treated with 3 days of IV ceftriaxone for presumed UTI  Urine culture grew Proteus mirabilis, awaiting susceptibilities  Patient has remained afebrile without leukocytosis    Also noted to have some urinary retention on admission, recently started on tramadol which could be contributing to weakness as well as urinary retention  Does have ambulatory dysfunction at baseline secondary to her underlying rheumatoid arthritis  She was seen by PT/OT, recommended rehab however patient refused  Opted to go home with home therapies  Case management has arranged for transport  Discussed with patient and niece that she should no longer take tramadol at home  I did prescribe several tablets of low-dose oxycodone for her chronic pain  Can discuss with PCP regarding pain management as outpatient  Condition at Discharge: stable     Discharge Day Visit / Exam:     Subjective: No complaints  Agreeable for discharge home today  Vitals: Blood Pressure: 135/68 (06/03/23 0730)  Pulse: 68 (06/03/23 0730)  Temperature: 98 1 °F (36 7 °C) (06/03/23 0730)  Temp Source: Oral (06/02/23 0500)  Respirations: 17 (06/03/23 0730)  SpO2: 98 % (06/03/23 0730)     Exam:   Physical Exam  Vitals and nursing note reviewed  Constitutional:       General: She is not in acute distress  Cardiovascular:      Rate and Rhythm: Normal rate  Pulmonary:      Effort: No respiratory distress  Musculoskeletal:         General: No swelling  Skin:     General: Skin is warm  Neurological:      Mental Status: She is alert and oriented to person, place, and time  Mental status is at baseline  Discussion with Family: Niece over the phone    Discharge instructions/Information to patient and family:   See after visit summary for information provided to patient and family  Provisions for Follow-Up Care:  See after visit summary for information related to follow-up care and any pertinent home health orders  Disposition:     Home with [facility] (Reminder: Complete face to face encounter)    For Discharges to [facility]:   · Not Applicable to this Patient - Not Applicable to this Patient    Planned Readmission: no     Discharge Statement:  I spent 40 minutes discharging the patient   This time was spent on the day of discharge  I had direct contact with the patient on the day of discharge  Greater than 50% of the total time was spent examining patient, answering all patient questions, arranging and discussing plan of care with patient as well as directly providing post-discharge instructions  Additional time then spent on discharge activities  Discharge Medications:  See after visit summary for reconciled discharge medications provided to patient and family        ** Please Note: This note has been constructed using a voice recognition system **

## 2023-06-03 NOTE — PLAN OF CARE
Problem: SAFETY ADULT  Goal: Maintain or return to baseline ADL function  Description: INTERVENTIONS:  -  Assess patient's ability to carry out ADLs; assess patient's baseline for ADL function and identify physical deficits which impact ability to perform ADLs (bathing, care of mouth/teeth, toileting, grooming, dressing, etc )  - Assess/evaluate cause of self-care deficits   - Assess range of motion  - Assess patient's mobility; develop plan if impaired  - Assess patient's need for assistive devices and provide as appropriate  - Encourage maximum independence but intervene and supervise when necessary  - Involve family in performance of ADLs  - Assess for home care needs following discharge   - Consider OT consult to assist with ADL evaluation and planning for discharge  - Provide patient education as appropriate  Outcome: Progressing  Goal: Maintains/Returns to pre admission functional level  Description: INTERVENTIONS:  - Perform BMAT or MOVE assessment daily    - Set and communicate daily mobility goal to care team and patient/family/caregiver  - Collaborate with rehabilitation services on mobility goals if consulted  - Perform Range of Motion 3 times a day  - Reposition patient every 3 hours    - Dangle patient 3 times a day  - Stand patient 3 times a day  - Ambulate patient 3 times a day  - Out of bed to chair 3 times a day   - Out of bed for meals 3 times a day  - Out of bed for toileting  - Record patient progress and toleration of activity level   Outcome: Progressing  Goal: Patient will remain free of falls  Description: INTERVENTIONS:  - Educate patient/family on patient safety including physical limitations  - Instruct patient to call for assistance with activity   - Consult OT/PT to assist with strengthening/mobility   - Keep Call bell within reach  - Keep bed low and locked with side rails adjusted as appropriate  - Keep care items and personal belongings within reach  - Initiate and maintain comfort rounds  - Make Fall Risk Sign visible to staff  - Offer Toileting every 4 Hours, in advance of need  - Initiate/Maintain bed alarm  - Apply yellow socks and bracelet for high fall risk patients  - Consider moving patient to room near nurses station  Outcome: Progressing     Problem: DISCHARGE PLANNING  Goal: Discharge to home or other facility with appropriate resources  Description: INTERVENTIONS:  - Identify barriers to discharge w/patient and caregiver  - Arrange for needed discharge resources and transportation as appropriate  - Identify discharge learning needs (meds, wound care, etc )  - Arrange for interpretive services to assist at discharge as needed  - Refer to Case Management Department for coordinating discharge planning if the patient needs post-hospital services based on physician/advanced practitioner order or complex needs related to functional status, cognitive ability, or social support system  Outcome: Progressing     Problem: Knowledge Deficit  Goal: Patient/family/caregiver demonstrates understanding of disease process, treatment plan, medications, and discharge instructions  Description: Complete learning assessment and assess knowledge base    Interventions:  - Provide teaching at level of understanding  - Provide teaching via preferred learning methods  Outcome: Progressing     Problem: GENITOURINARY - ADULT  Goal: Maintains or returns to baseline urinary function  Description: INTERVENTIONS:  - Assess urinary function  - Encourage oral fluids to ensure adequate hydration if ordered  - Administer ordered medications as needed  - Offer frequent toileting  - Follow urinary retention protocol if ordered  Outcome: Progressing  Goal: Absence of urinary retention  Description: INTERVENTIONS:  - Assess patient’s ability to void and empty bladder  - Monitor I/O  - Bladder scan as needed  - Discuss with physician/AP medications to alleviate retention as needed  - Discuss catheterization for long term situations as appropriate  Outcome: Progressing     Problem: Prexisting or High Potential for Compromised Skin Integrity  Goal: Skin integrity is maintained or improved  Description: INTERVENTIONS:  - Identify patients at risk for skin breakdown  - Assess and monitor skin integrity  - Assess and monitor nutrition and hydration status  - Monitor labs   - Assess for incontinence   - Turn and reposition patient  - Assist with mobility/ambulation  - Relieve pressure over bony prominences  - Avoid friction and shearing  - Provide appropriate hygiene as needed including keeping skin clean and dry  - Evaluate need for skin moisturizer/barrier cream  - Collaborate with interdisciplinary team   - Patient/family teaching  - Consider wound care consult   Outcome: Progressing

## 2023-06-05 ENCOUNTER — PATIENT OUTREACH (OUTPATIENT)
Dept: INTERNAL MEDICINE CLINIC | Facility: CLINIC | Age: 78
End: 2023-06-05

## 2023-06-05 ENCOUNTER — TRANSITIONAL CARE MANAGEMENT (OUTPATIENT)
Dept: FAMILY MEDICINE CLINIC | Facility: CLINIC | Age: 78
End: 2023-06-05

## 2023-06-05 ENCOUNTER — HOME CARE VISIT (OUTPATIENT)
Dept: HOME HEALTH SERVICES | Facility: HOME HEALTHCARE | Age: 78
End: 2023-06-05
Payer: MEDICARE

## 2023-06-05 DIAGNOSIS — Z71.89 COORDINATION OF COMPLEX CARE: Primary | ICD-10-CM

## 2023-06-05 LAB — BACTERIA UR CULT: ABNORMAL

## 2023-06-05 NOTE — PROGRESS NOTES
HRR referral received and chart review completed  Pt with recent hospitalization 6/1-6/3 for weakness, possible cause of UTI and treated with ceftriaxone  Pt discharged to home with home healthcare  Pts contact, saul Godoy called for interest in care management  Left message with contact information on voicemail  Kt Juan RN CM added to care team for follow up

## 2023-06-06 ENCOUNTER — TELEMEDICINE (OUTPATIENT)
Dept: FAMILY MEDICINE CLINIC | Facility: CLINIC | Age: 78
End: 2023-06-06
Payer: MEDICARE

## 2023-06-06 ENCOUNTER — HOME CARE VISIT (OUTPATIENT)
Dept: HOME HEALTH SERVICES | Facility: HOME HEALTHCARE | Age: 78
End: 2023-06-06

## 2023-06-06 ENCOUNTER — HOME CARE VISIT (OUTPATIENT)
Dept: HOME HEALTH SERVICES | Facility: HOME HEALTHCARE | Age: 78
End: 2023-06-06
Payer: MEDICARE

## 2023-06-06 VITALS
HEART RATE: 62 BPM | RESPIRATION RATE: 18 BRPM | DIASTOLIC BLOOD PRESSURE: 60 MMHG | TEMPERATURE: 97.7 F | OXYGEN SATURATION: 98 % | SYSTOLIC BLOOD PRESSURE: 112 MMHG

## 2023-06-06 DIAGNOSIS — R53.1 WEAKNESS: Primary | ICD-10-CM

## 2023-06-06 DIAGNOSIS — S32.030D CLOSED COMPRESSION FRACTURE OF L3 LUMBAR VERTEBRA WITH ROUTINE HEALING, SUBSEQUENT ENCOUNTER: ICD-10-CM

## 2023-06-06 LAB
BACTERIA BLD CULT: NORMAL
BACTERIA BLD CULT: NORMAL

## 2023-06-06 PROCEDURE — 99214 OFFICE O/P EST MOD 30 MIN: CPT | Performed by: FAMILY MEDICINE

## 2023-06-06 PROCEDURE — G0299 HHS/HOSPICE OF RN EA 15 MIN: HCPCS

## 2023-06-06 NOTE — PROGRESS NOTES
Virtual Regular Visit    Verification of patient location:    Patient is located at Home in the following state in which I hold an active license [place]      Assessment/Plan:    Problem List Items Addressed This Visit        Musculoskeletal and Integument    Closed compression fracture of L3 lumbar vertebra    Relevant Orders    Ambulatory Referral to Hospice       Other    Weakness - Primary     The camera is not working today so I am unable to complete a TCM  The patient is very weak and is interested in rehab but at this point may not be able to participate in rehab  Discussed with the   A referral to hospice was placed today to have a discussion about goals of care  Relevant Orders    Ambulatory Referral to Hospice            Reason for visit is   Chief Complaint   Patient presents with   • Virtual Regular Visit        Encounter provider Pranay Montes MD    Provider located at [address] [address]      Recent Visits  No visits were found meeting these conditions  Showing recent visits within past 7 days and meeting all other requirements  Today's Visits  Date Type Provider Dept   06/06/23 [facility] [place], [address] today's visits and meeting all other requirements  Future Appointments  No visits were found meeting these conditions  Showing future appointments within next 150 days and meeting all other requirements       The patient was identified by name and date of birth  Enrrique Rivera was informed that this is a telemedicine visit and that the visit is being conducted through Telephone  My office door was closed  No one else was in the room  She acknowledged consent and understanding of privacy and security of the video platform  The patient has agreed to participate and understands they can discontinue the visit at any time      Patient is aware this is a billable service  Subjective  Zari Martinez is a 65 y o  female       HPI     Pt was hospitalized from 6/1-6/3 for weakness and a fever  The urine culture grew proteus mirabilis resistant to ampicillin, cipro , Macrobid, bactrim and tetracycline  She has a pos post void residual and was advised to stop tramadol  She was given 3 days of ceftriaxone  Blood cultures were negative  She was seen by PT/OT who recommended rehab however the pt refused  She was discharged with home health  The pt reports she is not doing well  She stays in a recliner chair at all times  She is using pullups  Her niece is helping her with changing and cleaning  She is able to stand with assist but is unable to ambulate or balance on her own  She has home health who came out to the house today  The pt doesn't remember them coming out but her niece saw them on the camera  Past Medical History:   Diagnosis Date   • Acute blood loss anemia 9/9/2020   • Aftercare following joint replacement 9/9/2020    Patient had right reversed total shoulder arthroplasty   Pain was controlled when he left the hospital      • Anxiety    • Arthritis    • Cellulitis of left lower extremity 11/30/2019   • Depression    • Disease of thyroid gland     HYPO   • Dyspepsia 11/12/2019   • Dysphagia 2/6/2023   • Femur neck fracture ([facility])    • GERD (gastroesophageal reflux disease)    • Hyperthyroidism     RESOLVED: [id number]   • Hyponatremia 7/25/2022   • Leg pain 2/6/2023   • Osteoporosis    • Polio    • PVD (peripheral vascular disease) ([facility])    • Right BBB/left ant fasc block    • Shoulder pain, bilateral 7/27/2021   • UTI (urinary tract infection)    • Varicella infection     LAST ASSESSED: [id number]       Past Surgical History:   Procedure Laterality Date   • APPENDECTOMY     • HIP ARTHROPLASTY Left 11/27/2017    Procedure: REMOVAL IM NAIL CONVERSION TO TOTAL HIP ARTHROPLASTY POSTERIOR APPROACH;  Surgeon: Raf Mccracken MD;  Location: [facility]; Service: Orthopedics   • HIP FRACTURE SURGERY     • HIP SURGERY      both hips replaced;2013 left ,2014 right   • JOINT REPLACEMENT Right     HIP TOTAL   • IA ARTHROPLASTY GLENOHUMERAL JOINT TOTAL SHOULDER Right 9/2/2020    Procedure: ARTHROPLASTY SHOULDER REVERSE;  Surgeon: Kushal Fernandes MD;  Location: [facility];  Service: Orthopedics   • IA ARTHROPLASTY GLENOHUMERAL JOINT TOTAL SHOULDER Left 6/1/2021    Procedure: ARTHROPLASTY SHOULDER REVERSE;  Surgeon: Kushal Fernandes MD;  [facility];  Service: Orthopedics   • IA CONV PREV HIP TOT HIP ARTHRP W/WO AGRFT/ALGRFT Left 10/4/2017    Procedure: Hareware removal of left hip;  Surgeon: Sunita Larson MD;  [facility];  Service: Orthopedics   •  [address] SURG W/RLS TRANSVRS CARPL LIGM Right 5/24/2022    Procedure: RELEASE CARPAL TUNNEL ENDOSCOPIC-right;  Surgeon: Asia Hernandez MD;  [facility];  Service: Orthopedics   • REVISION TOTAL HIP ARTHROPLASTY Right    • TOE AMPUTATION Left 7/24/2022    Procedure: 5TH METATARSAL RESECTION WITH BOTTOM FLAP CLOSURE;  Surgeon: Juan R Hernandez DPM;  Location: [facility];  Service: Podiatry   • TONSILLECTOMY         Current Outpatient Medications   Medication Sig Dispense Refill   • acetaminophen (TYLENOL) 650 mg CR tablet Take 2 tablets (1,300 mg total) by mouth 3 (three) times a day 30 tablet 0   • Cholecalciferol 50 MCG (2000 UT) TBDP Take 1 tablet (2,000 Units total) by mouth daily 90 tablet 1   • folic acid (FOLVITE) 1 mg tablet Take 2 mg by mouth daily     • gabapentin (NEURONTIN) 100 mg capsule Take 2 capsules (200 mg total) by mouth 3 (three) times a day 90 capsule 1   • hydroxychloroquine (PLAQUENIL) 200 mg tablet Take 1 tablet (200 mg total) by mouth every morning AND 0 5 tablets (100 mg total) daily at bedtime   1 tab in am 1/2 tab in pm  135 tablet 1   • levothyroxine 75 mcg tablet Take 1 tablet (75 mcg total) by mouth daily in the early morning 90 tablet 1   • lidocaine (LMX) 4 % cream Apply topically as needed for mild pain 30 g 0   • melatonin 3 mg Take 1 tablet (3 mg total) by mouth daily at bedtime 30 tablet 0   • methocarbamol (ROBAXIN) 500 mg tablet Take 1 tablet (500 mg total) by mouth daily as needed for muscle spasms 30 tablet 3   • methotrexate 2 5 MG tablet 3 tablets by mouth once a week on Thursday     • Multiple Vitamins-Minerals (CENTRUM SILVER PO) Take 1 tablet by mouth daily     • nortriptyline (PAMELOR) 50 mg capsule Take 2 capsules (100 mg total) by mouth daily at bedtime 60 capsule 2   • oxyCODONE (ROXICODONE) 5 immediate release tablet Take 0 5 tablets (2 5 mg total) by mouth every 6 (six) hours as needed for severe pain Max Daily Amount: 10 mg 10 tablet 0   • polyethylene glycol (MIRALAX) 17 g packet Take 17 g by mouth daily Do not start before April 18, 2023   0   • rOPINIRole (REQUIP) 2 mg tablet Take 1 tablet (2 mg total) by mouth daily at bedtime 90 tablet 1   • senna-docusate sodium (SENOKOT S) 8 6-50 mg per tablet Take 1 tablet by mouth 2 (two) times a day 20 tablet 0     No current facility-administered medications for this visit  No Known Allergies    Review of Systems   Constitutional: Positive for activity change  Negative for fever and unexpected weight change  HENT: Negative for ear pain, sore throat and trouble swallowing  Eyes: Negative for pain and visual disturbance  Respiratory: Negative for cough, chest tightness, shortness of breath and wheezing  Cardiovascular: Negative for chest pain  Gastrointestinal: Negative for abdominal distention, abdominal pain, blood in stool, constipation, diarrhea, nausea and vomiting  Endocrine: Negative for polydipsia and polyuria  Genitourinary: Negative for dysuria and hematuria  Musculoskeletal: Positive for back pain and myalgias  Skin: Negative for rash  Neurological: Positive for weakness  Negative for syncope and headaches  Psychiatric/Behavioral: Negative for suicidal ideas  Video Exam    There were no vitals filed for this visit  Physical Exam   It was my intent to perform this visit via video technology but the patient was not able to do a video connection so the visit was completed via audio telephone only        Visit Time  Total Visit Duration: 30

## 2023-06-06 NOTE — ASSESSMENT & PLAN NOTE
The camera is not working today so I am unable to complete a TCM  The patient is very weak and is interested in rehab but at this point may not be able to participate in rehab  Discussed with the   A referral to hospice was placed today to have a discussion about goals of care

## 2023-06-07 ENCOUNTER — PATIENT OUTREACH (OUTPATIENT)
Dept: FAMILY MEDICINE CLINIC | Facility: CLINIC | Age: 78
End: 2023-06-07

## 2023-06-07 DIAGNOSIS — Z78.9 NEED FOR FOLLOW-UP BY SOCIAL WORKER: Primary | ICD-10-CM

## 2023-06-07 NOTE — PROGRESS NOTES
Request received from PCP for Outpatient Social Work Care Manager ([facility]) to outreach patient and assist with possible rehab placement  PCP also placed home hospice referral   Per chart review, SIL and [address] have communicated and will outreach patient's niece to further assess  Message sent to [employer] Worker Simeon Gates to discuss case  Awaiting response  Call placed to patient's niece Pegge Mis (on consent form) to check status of patient  Pegge Mis states patient has declined from baseline  She needs full assistance with ADLs; used to be able to stand and walk to commode but cannot do that right now  Patient would like to go to a short-term rehab facility to work on strength/endurance and return to her previous baseline  Medicare will not be able to cover short-term rehab at skilled nursing facility due to recent admission  Pegge Mis states patient would want to go to [facility]  Unknown if this level of rehab is appropriate for patient at this time  Will further discuss with SIL Gates

## 2023-06-08 ENCOUNTER — HOME CARE VISIT (OUTPATIENT)
Dept: HOME HEALTH SERVICES | Facility: HOME HEALTHCARE | Age: 78
End: 2023-06-08
Payer: MEDICARE

## 2023-06-08 ENCOUNTER — PATIENT OUTREACH (OUTPATIENT)
Dept: FAMILY MEDICINE CLINIC | Facility: CLINIC | Age: 78
End: 2023-06-08

## 2023-06-08 NOTE — PROGRESS NOTES
Return call received from [facility] LCSW who stated she met with patient and patient's niece twice in the home, discussed palliative vs hospice  Patient initially agreed to hospice, was hospitalized, returned home, and is now requesting rehab  [address] [place] [address]  team is questioning patient's rehab potential vs long-term care in SNF vs hospice  Patient currently receives 6 hours of aide services through the [facility]; these hours are capped at 6  Patient in process of getting Waiver services but additional bank statement is needed  Pratibha Cue requested PCP speak with patient about realistic possibility of rehab vs long-term care at SNF through [facility] vs hospice care  [facility] requesting she outreach patient to further discuss  Pratibha Cue provided phone number for [facility] [place] [address] [phone] incase PCP wanted to discuss patient's case with her  Pratibha Cue will be out of the office tomorrow afternoon through midweek next week  She will have covering  Almita meet with patient  OP SWCM will remain available to assist as needed

## 2023-06-09 ENCOUNTER — HOME CARE VISIT (OUTPATIENT)
Dept: HOME HEALTH SERVICES | Facility: HOME HEALTHCARE | Age: 78
End: 2023-06-09
Payer: MEDICARE

## 2023-06-09 VITALS — HEART RATE: 76 BPM | DIASTOLIC BLOOD PRESSURE: 70 MMHG | SYSTOLIC BLOOD PRESSURE: 114 MMHG | OXYGEN SATURATION: 98 %

## 2023-06-09 PROCEDURE — G0151 HHCP-SERV OF PT,EA 15 MIN: HCPCS

## 2023-06-12 ENCOUNTER — HOME CARE VISIT (OUTPATIENT)
Dept: HOME HEALTH SERVICES | Facility: HOME HEALTHCARE | Age: 78
End: 2023-06-12
Payer: MEDICARE

## 2023-06-12 VITALS — OXYGEN SATURATION: 94 % | SYSTOLIC BLOOD PRESSURE: 138 MMHG | DIASTOLIC BLOOD PRESSURE: 74 MMHG | HEART RATE: 79 BPM

## 2023-06-12 PROCEDURE — G0152 HHCP-SERV OF OT,EA 15 MIN: HCPCS

## 2023-06-12 PROCEDURE — G0151 HHCP-SERV OF PT,EA 15 MIN: HCPCS

## 2023-06-12 NOTE — CASE COMMUNICATION
"Informational only   no response required   PT reassessment completed on 6/9  Patient lives with sister who has dementia  Sister has caregiver3 days a week who also assists [place]  Prior to this recent hospitalization, patient was refusing to stand and attempt to walk due to back pain  \" when the pain gets better, I will get back to walking\"    Patient refused STR as recommended prior to DC but now is amendable to the idea as she state d during my visit\" My sister has her own problems and cant take care of me\"    During assessment patient was able to achieve standing position with chair lift at max height and min to mod a  During static standing activities she was able to advance either foot forward and backwards for  small lengths  Patient would benefit from trial of STR, but if she is unable to achieve independence with gait and transfer activities then long te rm placement would be more appropriate   "

## 2023-06-13 ENCOUNTER — HOME CARE VISIT (OUTPATIENT)
Dept: HOME HEALTH SERVICES | Facility: HOME HEALTHCARE | Age: 78
End: 2023-06-13
Payer: MEDICARE

## 2023-06-14 ENCOUNTER — HOME CARE VISIT (OUTPATIENT)
Dept: HOME HEALTH SERVICES | Facility: HOME HEALTHCARE | Age: 78
End: 2023-06-14
Payer: MEDICARE

## 2023-06-14 VITALS
HEART RATE: 71 BPM | SYSTOLIC BLOOD PRESSURE: 116 MMHG | DIASTOLIC BLOOD PRESSURE: 56 MMHG | RESPIRATION RATE: 16 BRPM | TEMPERATURE: 98 F | OXYGEN SATURATION: 97 %

## 2023-06-14 PROCEDURE — G0299 HHS/HOSPICE OF RN EA 15 MIN: HCPCS

## 2023-06-15 ENCOUNTER — HOME CARE VISIT (OUTPATIENT)
Dept: HOME HEALTH SERVICES | Facility: HOME HEALTHCARE | Age: 78
End: 2023-06-15
Payer: MEDICARE

## 2023-06-15 PROCEDURE — G0151 HHCP-SERV OF PT,EA 15 MIN: HCPCS

## 2023-06-15 PROCEDURE — G0152 HHCP-SERV OF OT,EA 15 MIN: HCPCS

## 2023-06-16 VITALS — SYSTOLIC BLOOD PRESSURE: 130 MMHG | DIASTOLIC BLOOD PRESSURE: 78 MMHG | HEART RATE: 85 BPM | OXYGEN SATURATION: 98 %

## 2023-06-20 ENCOUNTER — HOME CARE VISIT (OUTPATIENT)
Dept: HOME HEALTH SERVICES | Facility: HOME HEALTHCARE | Age: 78
End: 2023-06-20
Payer: MEDICARE

## 2023-06-20 VITALS
OXYGEN SATURATION: 98 % | HEART RATE: 80 BPM | RESPIRATION RATE: 20 BRPM | DIASTOLIC BLOOD PRESSURE: 64 MMHG | SYSTOLIC BLOOD PRESSURE: 112 MMHG

## 2023-06-20 PROCEDURE — G0151 HHCP-SERV OF PT,EA 15 MIN: HCPCS

## 2023-06-20 PROCEDURE — G0152 HHCP-SERV OF OT,EA 15 MIN: HCPCS

## 2023-06-21 ENCOUNTER — HOME CARE VISIT (OUTPATIENT)
Dept: HOME HEALTH SERVICES | Facility: HOME HEALTHCARE | Age: 78
End: 2023-06-21
Payer: MEDICARE

## 2023-06-21 VITALS
RESPIRATION RATE: 18 BRPM | SYSTOLIC BLOOD PRESSURE: 124 MMHG | OXYGEN SATURATION: 97 % | HEART RATE: 72 BPM | DIASTOLIC BLOOD PRESSURE: 72 MMHG | TEMPERATURE: 98 F

## 2023-06-21 VITALS — SYSTOLIC BLOOD PRESSURE: 112 MMHG | OXYGEN SATURATION: 98 % | HEART RATE: 80 BPM | DIASTOLIC BLOOD PRESSURE: 64 MMHG

## 2023-06-21 PROCEDURE — G0299 HHS/HOSPICE OF RN EA 15 MIN: HCPCS

## 2023-06-22 ENCOUNTER — HOME CARE VISIT (OUTPATIENT)
Dept: HOME HEALTH SERVICES | Facility: HOME HEALTHCARE | Age: 78
End: 2023-06-22
Payer: MEDICARE

## 2023-06-22 VITALS — SYSTOLIC BLOOD PRESSURE: 107 MMHG | DIASTOLIC BLOOD PRESSURE: 70 MMHG | OXYGEN SATURATION: 97 % | HEART RATE: 80 BPM

## 2023-06-22 VITALS
OXYGEN SATURATION: 90 % | DIASTOLIC BLOOD PRESSURE: 74 MMHG | SYSTOLIC BLOOD PRESSURE: 109 MMHG | RESPIRATION RATE: 20 BRPM | HEART RATE: 80 BPM

## 2023-06-22 PROCEDURE — G0152 HHCP-SERV OF OT,EA 15 MIN: HCPCS

## 2023-06-22 PROCEDURE — G0151 HHCP-SERV OF PT,EA 15 MIN: HCPCS

## 2023-06-25 ENCOUNTER — APPOINTMENT (EMERGENCY)
Dept: RADIOLOGY | Facility: HOSPITAL | Age: 78
DRG: 689 | End: 2023-06-25
Payer: MEDICARE

## 2023-06-25 ENCOUNTER — HOSPITAL ENCOUNTER (INPATIENT)
Facility: HOSPITAL | Age: 78
LOS: 13 days | Discharge: NURSING, REHAB OR LONG-TERM CARE | DRG: 689 | End: 2023-07-08
Attending: EMERGENCY MEDICINE | Admitting: INTERNAL MEDICINE
Payer: MEDICARE

## 2023-06-25 DIAGNOSIS — E46 PROTEIN-CALORIE MALNUTRITION, UNSPECIFIED SEVERITY (HCC): ICD-10-CM

## 2023-06-25 DIAGNOSIS — R53.1 WEAKNESS: Primary | ICD-10-CM

## 2023-06-25 DIAGNOSIS — N12 PYELONEPHRITIS: ICD-10-CM

## 2023-06-25 DIAGNOSIS — E87.6 HYPOKALEMIA: ICD-10-CM

## 2023-06-25 DIAGNOSIS — G89.4 CHRONIC PAIN SYNDROME: ICD-10-CM

## 2023-06-25 DIAGNOSIS — R13.10 DYSPHAGIA, UNSPECIFIED TYPE: ICD-10-CM

## 2023-06-25 DIAGNOSIS — I73.9 PVD (PERIPHERAL VASCULAR DISEASE) (HCC): ICD-10-CM

## 2023-06-25 DIAGNOSIS — R78.81 BACTEREMIA DUE TO ESCHERICHIA COLI: ICD-10-CM

## 2023-06-25 DIAGNOSIS — B96.20 BACTEREMIA DUE TO ESCHERICHIA COLI: ICD-10-CM

## 2023-06-25 DIAGNOSIS — S32.050D COMPRESSION FRACTURE OF L5 VERTEBRA WITH ROUTINE HEALING: ICD-10-CM

## 2023-06-25 DIAGNOSIS — R64 CACHEXIA (HCC): ICD-10-CM

## 2023-06-25 DIAGNOSIS — S32.030D CLOSED COMPRESSION FRACTURE OF L3 LUMBAR VERTEBRA WITH ROUTINE HEALING, SUBSEQUENT ENCOUNTER: ICD-10-CM

## 2023-06-25 PROBLEM — R11.2 NAUSEA AND VOMITING: Status: ACTIVE | Noted: 2023-06-25

## 2023-06-25 LAB
2HR DELTA HS TROPONIN: -7 NG/L
4HR DELTA HS TROPONIN: -4 NG/L
ALBUMIN SERPL BCP-MCNC: 2.5 G/DL (ref 3.5–5)
ALP SERPL-CCNC: 130 U/L (ref 46–116)
ALT SERPL W P-5'-P-CCNC: 24 U/L (ref 12–78)
ANION GAP SERPL CALCULATED.3IONS-SCNC: 6 MMOL/L
APTT PPP: 25 SECONDS (ref 23–37)
AST SERPL W P-5'-P-CCNC: 19 U/L (ref 5–45)
ATRIAL RATE: 96 BPM
BASOPHILS # BLD AUTO: 0.02 THOUSANDS/ÂΜL (ref 0–0.1)
BASOPHILS NFR BLD AUTO: 0 % (ref 0–1)
BILIRUB SERPL-MCNC: 0.47 MG/DL (ref 0.2–1)
BUN SERPL-MCNC: 15 MG/DL (ref 5–25)
CALCIUM ALBUM COR SERPL-MCNC: 10 MG/DL (ref 8.3–10.1)
CALCIUM SERPL-MCNC: 8.8 MG/DL (ref 8.3–10.1)
CARDIAC TROPONIN I PNL SERPL HS: 21 NG/L
CARDIAC TROPONIN I PNL SERPL HS: 24 NG/L
CARDIAC TROPONIN I PNL SERPL HS: 28 NG/L
CHLORIDE SERPL-SCNC: 102 MMOL/L (ref 96–108)
CO2 SERPL-SCNC: 23 MMOL/L (ref 21–32)
CREAT SERPL-MCNC: 0.48 MG/DL (ref 0.6–1.3)
EOSINOPHIL # BLD AUTO: 0 THOUSAND/ÂΜL (ref 0–0.61)
EOSINOPHIL NFR BLD AUTO: 0 % (ref 0–6)
ERYTHROCYTE [DISTWIDTH] IN BLOOD BY AUTOMATED COUNT: 15.9 % (ref 11.6–15.1)
GFR SERPL CREATININE-BSD FRML MDRD: 93 ML/MIN/1.73SQ M
GLUCOSE SERPL-MCNC: 117 MG/DL (ref 65–140)
HCT VFR BLD AUTO: 36.7 % (ref 34.8–46.1)
HGB BLD-MCNC: 11.3 G/DL (ref 11.5–15.4)
IMM GRANULOCYTES # BLD AUTO: 0.17 THOUSAND/UL (ref 0–0.2)
IMM GRANULOCYTES NFR BLD AUTO: 1 % (ref 0–2)
INR PPP: 1 (ref 0.84–1.19)
LACTATE SERPL-SCNC: 1.8 MMOL/L (ref 0.5–2)
LIPASE SERPL-CCNC: 35 U/L (ref 73–393)
LYMPHOCYTES # BLD AUTO: 0.43 THOUSANDS/ÂΜL (ref 0.6–4.47)
LYMPHOCYTES NFR BLD AUTO: 3 % (ref 14–44)
MCH RBC QN AUTO: 26.4 PG (ref 26.8–34.3)
MCHC RBC AUTO-ENTMCNC: 30.8 G/DL (ref 31.4–37.4)
MCV RBC AUTO: 86 FL (ref 82–98)
MONOCYTES # BLD AUTO: 1.66 THOUSAND/ÂΜL (ref 0.17–1.22)
MONOCYTES NFR BLD AUTO: 10 % (ref 4–12)
NEUTROPHILS # BLD AUTO: 15.18 THOUSANDS/ÂΜL (ref 1.85–7.62)
NEUTS SEG NFR BLD AUTO: 86 % (ref 43–75)
NRBC BLD AUTO-RTO: 0 /100 WBCS
P AXIS: 71 DEGREES
PLATELET # BLD AUTO: 241 THOUSANDS/UL (ref 149–390)
PMV BLD AUTO: 9.1 FL (ref 8.9–12.7)
POTASSIUM SERPL-SCNC: 2.7 MMOL/L (ref 3.5–5.3)
PR INTERVAL: 138 MS
PROCALCITONIN SERPL-MCNC: 3.48 NG/ML
PROT SERPL-MCNC: 7.2 G/DL (ref 6.4–8.4)
PROTHROMBIN TIME: 13.4 SECONDS (ref 11.6–14.5)
QRS AXIS: 84 DEGREES
QRSD INTERVAL: 132 MS
QT INTERVAL: 380 MS
QTC INTERVAL: 480 MS
RBC # BLD AUTO: 4.28 MILLION/UL (ref 3.81–5.12)
SODIUM SERPL-SCNC: 131 MMOL/L (ref 135–147)
T WAVE AXIS: 71 DEGREES
VENTRICULAR RATE: 96 BPM
WBC # BLD AUTO: 17.46 THOUSAND/UL (ref 4.31–10.16)

## 2023-06-25 PROCEDURE — 36415 COLL VENOUS BLD VENIPUNCTURE: CPT

## 2023-06-25 PROCEDURE — 87154 CUL TYP ID BLD PTHGN 6+ TRGT: CPT

## 2023-06-25 PROCEDURE — 74177 CT ABD & PELVIS W/CONTRAST: CPT

## 2023-06-25 PROCEDURE — 84145 PROCALCITONIN (PCT): CPT

## 2023-06-25 PROCEDURE — 80053 COMPREHEN METABOLIC PANEL: CPT

## 2023-06-25 PROCEDURE — 85610 PROTHROMBIN TIME: CPT

## 2023-06-25 PROCEDURE — 84484 ASSAY OF TROPONIN QUANT: CPT

## 2023-06-25 PROCEDURE — 83690 ASSAY OF LIPASE: CPT

## 2023-06-25 PROCEDURE — 99223 1ST HOSP IP/OBS HIGH 75: CPT | Performed by: INTERNAL MEDICINE

## 2023-06-25 PROCEDURE — 96375 TX/PRO/DX INJ NEW DRUG ADDON: CPT

## 2023-06-25 PROCEDURE — 83605 ASSAY OF LACTIC ACID: CPT

## 2023-06-25 PROCEDURE — 93005 ELECTROCARDIOGRAM TRACING: CPT

## 2023-06-25 PROCEDURE — 85730 THROMBOPLASTIN TIME PARTIAL: CPT

## 2023-06-25 PROCEDURE — 96366 THER/PROPH/DIAG IV INF ADDON: CPT

## 2023-06-25 PROCEDURE — 99285 EMERGENCY DEPT VISIT HI MDM: CPT | Performed by: EMERGENCY MEDICINE

## 2023-06-25 PROCEDURE — 71260 CT THORAX DX C+: CPT

## 2023-06-25 PROCEDURE — 87040 BLOOD CULTURE FOR BACTERIA: CPT

## 2023-06-25 PROCEDURE — G1004 CDSM NDSC: HCPCS

## 2023-06-25 PROCEDURE — 87186 SC STD MICRODIL/AGAR DIL: CPT

## 2023-06-25 PROCEDURE — 99497 ADVNCD CARE PLAN 30 MIN: CPT | Performed by: INTERNAL MEDICINE

## 2023-06-25 PROCEDURE — 93010 ELECTROCARDIOGRAM REPORT: CPT | Performed by: INTERNAL MEDICINE

## 2023-06-25 PROCEDURE — 96361 HYDRATE IV INFUSION ADD-ON: CPT

## 2023-06-25 PROCEDURE — 85025 COMPLETE CBC W/AUTO DIFF WBC: CPT

## 2023-06-25 PROCEDURE — 71045 X-RAY EXAM CHEST 1 VIEW: CPT

## 2023-06-25 PROCEDURE — 96365 THER/PROPH/DIAG IV INF INIT: CPT

## 2023-06-25 PROCEDURE — 99285 EMERGENCY DEPT VISIT HI MDM: CPT

## 2023-06-25 RX ORDER — ROPINIROLE 2 MG/1
2 TABLET, FILM COATED ORAL
Status: DISCONTINUED | OUTPATIENT
Start: 2023-06-25 | End: 2023-06-27

## 2023-06-25 RX ORDER — NORTRIPTYLINE HYDROCHLORIDE 50 MG/1
100 CAPSULE ORAL
Status: DISCONTINUED | OUTPATIENT
Start: 2023-06-25 | End: 2023-06-27

## 2023-06-25 RX ORDER — ONDANSETRON 2 MG/ML
4 INJECTION INTRAMUSCULAR; INTRAVENOUS EVERY 6 HOURS PRN
Status: DISCONTINUED | OUTPATIENT
Start: 2023-06-25 | End: 2023-07-08 | Stop reason: HOSPADM

## 2023-06-25 RX ORDER — PANTOPRAZOLE SODIUM 40 MG/1
40 TABLET, DELAYED RELEASE ORAL
Status: DISCONTINUED | OUTPATIENT
Start: 2023-06-26 | End: 2023-06-26

## 2023-06-25 RX ORDER — HYDROXYCHLOROQUINE SULFATE 200 MG/1
100 TABLET, FILM COATED ORAL
Status: DISCONTINUED | OUTPATIENT
Start: 2023-06-25 | End: 2023-06-27

## 2023-06-25 RX ORDER — LEVOTHYROXINE SODIUM 0.07 MG/1
75 TABLET ORAL
Status: DISCONTINUED | OUTPATIENT
Start: 2023-06-26 | End: 2023-06-27

## 2023-06-25 RX ORDER — POTASSIUM CHLORIDE 14.9 MG/ML
20 INJECTION INTRAVENOUS ONCE
Status: COMPLETED | OUTPATIENT
Start: 2023-06-25 | End: 2023-06-25

## 2023-06-25 RX ORDER — METHOCARBAMOL 500 MG/1
500 TABLET, FILM COATED ORAL DAILY PRN
Status: DISCONTINUED | OUTPATIENT
Start: 2023-06-25 | End: 2023-06-27

## 2023-06-25 RX ORDER — AMOXICILLIN 250 MG
1 CAPSULE ORAL 2 TIMES DAILY
Status: DISCONTINUED | OUTPATIENT
Start: 2023-06-25 | End: 2023-06-27

## 2023-06-25 RX ORDER — LANOLIN ALCOHOL/MO/W.PET/CERES
3 CREAM (GRAM) TOPICAL
Status: DISCONTINUED | OUTPATIENT
Start: 2023-06-25 | End: 2023-06-27

## 2023-06-25 RX ORDER — SODIUM CHLORIDE, SODIUM GLUCONATE, SODIUM ACETATE, POTASSIUM CHLORIDE, MAGNESIUM CHLORIDE, SODIUM PHOSPHATE, DIBASIC, AND POTASSIUM PHOSPHATE .53; .5; .37; .037; .03; .012; .00082 G/100ML; G/100ML; G/100ML; G/100ML; G/100ML; G/100ML; G/100ML
100 INJECTION, SOLUTION INTRAVENOUS CONTINUOUS
Status: DISCONTINUED | OUTPATIENT
Start: 2023-06-25 | End: 2023-06-30

## 2023-06-25 RX ORDER — HYDROMORPHONE HCL/PF 1 MG/ML
0.5 SYRINGE (ML) INJECTION ONCE
Status: COMPLETED | OUTPATIENT
Start: 2023-06-25 | End: 2023-06-25

## 2023-06-25 RX ORDER — ACETAMINOPHEN 325 MG/1
975 TABLET ORAL EVERY 8 HOURS SCHEDULED
Status: DISCONTINUED | OUTPATIENT
Start: 2023-06-25 | End: 2023-06-27

## 2023-06-25 RX ORDER — HEPARIN SODIUM 5000 [USP'U]/ML
5000 INJECTION, SOLUTION INTRAVENOUS; SUBCUTANEOUS EVERY 8 HOURS SCHEDULED
Status: DISCONTINUED | OUTPATIENT
Start: 2023-06-25 | End: 2023-07-08 | Stop reason: HOSPADM

## 2023-06-25 RX ORDER — GABAPENTIN 100 MG/1
200 CAPSULE ORAL 3 TIMES DAILY
Status: DISCONTINUED | OUTPATIENT
Start: 2023-06-25 | End: 2023-06-27

## 2023-06-25 RX ORDER — HYDROXYCHLOROQUINE SULFATE 200 MG/1
200 TABLET, FILM COATED ORAL SEE ADMIN INSTRUCTIONS
Status: DISCONTINUED | OUTPATIENT
Start: 2023-06-25 | End: 2023-06-25

## 2023-06-25 RX ORDER — ONDANSETRON 2 MG/ML
4 INJECTION INTRAMUSCULAR; INTRAVENOUS ONCE
Status: COMPLETED | OUTPATIENT
Start: 2023-06-25 | End: 2023-06-25

## 2023-06-25 RX ORDER — POLYETHYLENE GLYCOL 3350 17 G/17G
17 POWDER, FOR SOLUTION ORAL DAILY
Status: DISCONTINUED | OUTPATIENT
Start: 2023-06-26 | End: 2023-06-27

## 2023-06-25 RX ORDER — HYDROXYCHLOROQUINE SULFATE 200 MG/1
200 TABLET, FILM COATED ORAL
Status: DISCONTINUED | OUTPATIENT
Start: 2023-06-26 | End: 2023-06-27

## 2023-06-25 RX ORDER — FOLIC ACID 1 MG/1
2 TABLET ORAL DAILY
Status: DISCONTINUED | OUTPATIENT
Start: 2023-06-26 | End: 2023-06-27

## 2023-06-25 RX ORDER — POTASSIUM CHLORIDE 20 MEQ/1
60 TABLET, EXTENDED RELEASE ORAL ONCE
Status: COMPLETED | OUTPATIENT
Start: 2023-06-25 | End: 2023-06-25

## 2023-06-25 RX ORDER — MELATONIN
2000 DAILY
Status: DISCONTINUED | OUTPATIENT
Start: 2023-06-26 | End: 2023-06-27

## 2023-06-25 RX ADMIN — SODIUM CHLORIDE, SODIUM GLUCONATE, SODIUM ACETATE, POTASSIUM CHLORIDE, MAGNESIUM CHLORIDE, SODIUM PHOSPHATE, DIBASIC, AND POTASSIUM PHOSPHATE 100 ML/HR: .53; .5; .37; .037; .03; .012; .00082 INJECTION, SOLUTION INTRAVENOUS at 22:04

## 2023-06-25 RX ADMIN — SENNOSIDES AND DOCUSATE SODIUM 1 TABLET: 50; 8.6 TABLET ORAL at 23:29

## 2023-06-25 RX ADMIN — SODIUM CHLORIDE 500 ML: 0.9 INJECTION, SOLUTION INTRAVENOUS at 16:52

## 2023-06-25 RX ADMIN — HYDROXYCHLOROQUINE SULFATE 100 MG: 200 TABLET ORAL at 23:29

## 2023-06-25 RX ADMIN — MELATONIN 3 MG: at 22:08

## 2023-06-25 RX ADMIN — HEPARIN SODIUM 5000 UNITS: 5000 INJECTION INTRAVENOUS; SUBCUTANEOUS at 22:08

## 2023-06-25 RX ADMIN — POTASSIUM CHLORIDE 60 MEQ: 1500 TABLET, EXTENDED RELEASE ORAL at 18:00

## 2023-06-25 RX ADMIN — NORTRIPTYLINE HYDROCHLORIDE 100 MG: 50 CAPSULE ORAL at 23:29

## 2023-06-25 RX ADMIN — ROPINIROLE 2 MG: 2 TABLET, FILM COATED ORAL at 23:29

## 2023-06-25 RX ADMIN — HYDROMORPHONE HYDROCHLORIDE 0.5 MG: 1 INJECTION, SOLUTION INTRAMUSCULAR; INTRAVENOUS; SUBCUTANEOUS at 17:56

## 2023-06-25 RX ADMIN — ACETAMINOPHEN 975 MG: 325 TABLET ORAL at 22:08

## 2023-06-25 RX ADMIN — ONDANSETRON 4 MG: 2 INJECTION INTRAMUSCULAR; INTRAVENOUS at 16:52

## 2023-06-25 RX ADMIN — IOHEXOL 70 ML: 350 INJECTION, SOLUTION INTRAVENOUS at 18:43

## 2023-06-25 RX ADMIN — POTASSIUM CHLORIDE 20 MEQ: 14.9 INJECTION, SOLUTION INTRAVENOUS at 18:49

## 2023-06-25 RX ADMIN — GABAPENTIN 200 MG: 100 CAPSULE ORAL at 22:08

## 2023-06-25 RX ADMIN — CEFEPIME 2000 MG: 2 INJECTION, POWDER, FOR SOLUTION INTRAVENOUS at 20:47

## 2023-06-25 NOTE — ED PROVIDER NOTES
Chief Complaint   Patient presents with   • Weakness - Generalized     Arrived via EMS. Weakness for last 3 days. N/V starting last night and pt reports one episode of diarrhea last night. History of Present Illness and Review of Systems   This is a 77 y.o. female with PMH significant for RA immunosuppression, recent hospitalization for UTI, cachexia coming in today with complaint of weakness. She reports she has been feeling this for the last several days. Describes generalized malaise. She has had a decreased appetite as well and reports she feels "dehydrated". She describes associated nausea and vomiting in addition to generalized abdominal pain. She reports some associated diarrhea as well. No alleviating factors. She has an extensive complex past medical history including recent hospitalization treated with Rocephin for UTI. She is a candidate for hospice however documentation reveals that she has deferred going to hospice currently. Otherwise denies any fevers, chills, chest pains, episodes syncope, confusion, unilateral weakness. Chart review also notable for chronic pain, appendectomy, and hip arthroplasty. No other symptoms currently. Remainder of ROS Reviewed and Non-Pertinent    No other complaints for this encounter.    - No language barrier.   - History obtained from patient and chart   - Reviewed relevant past medical/family/social history  - There are no limitations to the history obtained.     Past Medical, Past Surgical History:    has a past medical history of Acute blood loss anemia (9/9/2020), Aftercare following joint replacement (9/9/2020), Anxiety, Arthritis, Cellulitis of left lower extremity (11/30/2019), Depression, Disease of thyroid gland, Dyspepsia (11/12/2019), Dysphagia (2/6/2023), Femur neck fracture ([address]), GERD (gastroesophageal reflux disease), Hyperthyroidism, Hyponatremia (7/25/2022), Leg pain (2/6/2023), Osteoporosis, Polio, PVD (peripheral vascular disease) [facility]), Right BBB/left ant fasc block, Shoulder pain, bilateral (7/27/2021), UTI (urinary tract infection), and Varicella infection. has a past surgical history that includes Hip surgery; Joint replacement (Right); pr conv prev hip tot hip arthrp w/wo agrft/algrft (Left, 10/4/2017); Revision total hip arthroplasty (Right); Appendectomy; Tonsillectomy; Hip Arthroplasty (Left, 11/27/2017); Hip fracture surgery; pr arthroplasty glenohumeral joint total shoulder (Right, 9/2/2020); pr arthroplasty glenohumeral joint total shoulder (Left, 6/1/2021); pr ndsc wrst surg w/rls transvrs carpl ligm (Right, 5/24/2022); and Toe amputation (Left, 7/24/2022).      Allergies:   No Known Allergies    Social and Family History:     Social History     Substance and Sexual Activity   Alcohol Use Not Currently     Social History     Tobacco Use   Smoking Status Former   Smokeless Tobacco Never   Tobacco Comments    quit 20-30 years ago     Social History     Substance and Sexual Activity   Drug Use Not Currently       Physical Examination     Vitals:    06/25/23 1900 06/25/23 2115 06/25/23 2200 06/25/23 2300   BP: 145/69 (!) 176/82 165/79 151/78   BP Location:       Pulse: 90 98 94 100   Resp: 18 18 16 16   Temp:       TempSrc:       SpO2: 96% 94% 95% 96%       Physical Exam: The patient is resting in bed, comfortable, chronically ill appearing, cachectic, mucous membranes are moist,, EOM intact, pupils are equal and reactive, heart lungs are clear to auscultation, she does have generalized abdominal tenderness, nonlocal lysed, no peritoneal signs, she has no evidence of peripheral edema, no rashes lesions or erythema, strong pulses are palpated distally, range of motion is limited in her 4 extremities secondary to pain which she reports is chronic, no neurologic deficits      Risk Stratification Tools                Orders Placed This Encounter   Procedures   • ED ECG Documentation Only   • Blood culture #1   • Blood culture #2   • Clostridium difficile toxin by PCR with EIA   • XR chest 1 view portable   • CT chest abdomen pelvis w contrast   • CBC and differential   • Comprehensive metabolic panel   • Lactic acid   • Procalcitonin   • Protime-INR   • APTT   • UA w Reflex to Microscopic w Reflex to Culture   • Lipase   • HS Troponin 0hr (reflex protocol)   • HS Troponin I 2hr   • HS Troponin I 4hr   • Procalcitonin, Next Day AM Collection   • Basic metabolic panel   • CBC and Platelet   • Diet Regular; Regular House   • Insert peripheral IV   • Nursing communication Continue IV as ordered.    • Notify admitting physician   • Notify admitting physician on arrival   • 24 Hour Telemetry Monitoring   • Nursing Communication Provide patient with education on Sepsis Zone Tool and document in the Education activity   • Vital Signs per unit routine   • Up and OOB as tolerated   • Nursing dysphagia Screen   • Apply SCD or Foot pumps   • Level 1-Full Code: all life saving measures are indicated   • ECG 12 lead   • INPATIENT ADMISSION       Labs:     Labs Reviewed   CBC AND DIFFERENTIAL - Abnormal       Result Value Ref Range Status    WBC 17.46 (*) 4.31 - 10.16 Thousand/uL Final    RBC 4.28  3.81 - 5.12 Million/uL Final    Hemoglobin 11.3 (*) 11.5 - 15.4 g/dL Final    Hematocrit 36.7  34.8 - 46.1 % Final    MCV 86  82 - 98 fL Final    MCH 26.4 (*) 26.8 - 34.3 pg Final    MCHC 30.8 (*) 31.4 - 37.4 g/dL Final    RDW 15.9 (*) 11.6 - 15.1 % Final    MPV 9.1  8.9 - 12.7 fL Final    Platelets 485  639 - 390 Thousands/uL Final    nRBC 0  /100 WBCs Final    Neutrophils Relative 86 (*) 43 - 75 % Final    Immat GRANS % 1  0 - 2 % Final    Lymphocytes Relative 3 (*) 14 - 44 % Final    Monocytes Relative 10  4 - 12 % Final    Eosinophils Relative 0  0 - 6 % Final    Basophils Relative 0  0 - 1 % Final    Neutrophils Absolute 15.18 (*) 1.85 - 7.62 Thousands/µL Final    Immature Grans Absolute 0.17  0.00 - 0.20 Thousand/uL Final    Lymphocytes Absolute 0.43 (*) 0.60 - 4.47 Thousands/µL Final    Monocytes Absolute 1.66 (*) 0.17 - 1.22 Thousand/µL Final    Eosinophils Absolute 0.00  0.00 - 0.61 Thousand/µL Final    Basophils Absolute 0.02  0.00 - 0.10 Thousands/µL Final   COMPREHENSIVE METABOLIC PANEL - Abnormal    Sodium 131 (*) 135 - 147 mmol/L Final    Potassium 2.7 (*) 3.5 - 5.3 mmol/L Final    Chloride 102  96 - 108 mmol/L Final    CO2 23  21 - 32 mmol/L Final    ANION GAP 6  mmol/L Final    BUN 15  5 - 25 mg/dL Final    Creatinine 0.48 (*) 0.60 - 1.30 mg/dL Final    Comment: Standardized to IDMS reference method    Glucose 117  65 - 140 mg/dL Final    Comment: If the patient is fasting, the ADA then defines impaired fasting glucose as > 100 mg/dL and diabetes as > or equal to 123 mg/dL. Specimen collection should occur prior to Sulfasalazine administration due to the potential for falsely depressed results. Specimen collection should occur prior to Sulfapyridine administration due to the potential for falsely elevated results. Calcium 8.8  8.3 - 10.1 mg/dL Final    Corrected Calcium 10.0  8.3 - 10.1 mg/dL Final    AST 19  5 - 45 U/L Final    Comment: Specimen collection should occur prior to Sulfasalazine administration due to the potential for falsely depressed results. ALT 24  12 - 78 U/L Final    Comment: Specimen collection should occur prior to Sulfasalazine and/or Sulfapyridine administration due to the potential for falsely depressed results. Alkaline Phosphatase 130 (*) 46 - 116 U/L Final    Total Protein 7.2  6.4 - 8.4 g/dL Final    Albumin 2.5 (*) 3.5 - 5.0 g/dL Final    Total Bilirubin 0.47  0.20 - 1.00 mg/dL Final    Comment: Use of this assay is not recommended for patients undergoing treatment with eltrombopag due to the potential for falsely elevated results.     eGFR 93  ml/min/1.73sq m Final    Narrative:     [place] guidelines for Chronic Kidney Disease (CKD):   •  Stage 1 with normal or high GFR (GFR > 90 mL/min/1.73 square meters)  •  Stage 2 Mild CKD (GFR = 60-89 mL/min/1.73 square meters)  •  Stage 3A Moderate CKD (GFR = 45-59 mL/min/1.73 square meters)  •  Stage 3B Moderate CKD (GFR = 30-44 mL/min/1.73 square meters)  •  Stage 4 Severe CKD (GFR = 15-29 mL/min/1.73 square meters)  •  Stage 5 End Stage CKD (GFR <15 mL/min/1.73 square meters)  Note: GFR calculation is accurate only with a steady state creatinine   PROCALCITONIN TEST - Abnormal    Procalcitonin 3.48 (*) <=0.25 ng/ml Final    Comment: Suspected Lower Respiratory Tract Infection (LRTI):  - LESS than or EQUAL to 0.25 ng/mL:   low likelihood for bacterial LRTI; antibiotics DISCOURAGED.  - GREATER than 0.25 ng/mL:   increased likelihood for bacterial LRTI; antibiotics ENCOURAGED. Suspected Sepsis:  - Strongly consider initiating antibiotics in ALL UNSTABLE patients. - LESS than or EQUAL to 0.5 ng/mL:   low likelihood for bacterial sepsis; antibiotics DISCOURAGED.  - GREATER than 0.5 ng/mL:   increased likelihood for bacterial sepsis; antibiotics ENCOURAGED.  - GREATER than 2 ng/mL:   high risk for severe sepsis / septic shock; antibiotics strongly ENCOURAGED. Decisions on antibiotic use should not be based solely on Procalcitonin (PCT) levels. If PCT is low but uncertainty exists with stopping antibiotics, repeat PCT in 6-24 hours to confirm the low level. If antibiotics are administered (regardless if initial PCT was high or low), repeat PCT every 1-2 days to consider early antibiotic cessation (when GREATER than 80% decrease from the peak OR when PCT drops below designated cutoffs, whichever comes first), so long as the infection is NOT one that typically requires prolonged treatment durations (e.g., bone/joint infections, endocarditis, Staph. aureus bacteremia).     Situations of FALSE-POSITIVE Procalcitonin values:  1) Newborns < 67 hours old  2) Massive stress from severe trauma / burns, major surgery, acute pancreatitis, cardiogenic / hemorrhagic shock, sickle cell crisis, or other organ perfusion abnormalities  3) Malaria and some Candidal infections  4) Treatment with agents that stimulate cytokines (e.g., OKT3, anti-lymphocyte globulins, alemtuzumab, IL-2, granulocyte transfusion [NOT GCSFs])  5) Chronic renal disease causes elevated baseline levels (consider GREATER than 0.75 ng/mL as an abnormal cut-off); initiating HD/CRRT may cause transient decreases  6) Paraneoplastic syndromes from medullary thyroid or SCLC, some forms of vasculitis, and acute [id number] disease    Situations of FALSE-NEGATIVE Procalcitonin values:  1) Too early in clinical course for PCT to have reached its peak (may repeat in 6-24 hours to confirm low level)  2) Localized infection WITHOUT systemic (SIRS / sepsis) response (e.g., an abscess, osteomyelitis, cystitis)  3) Mycobacteria (e.g., Tuberculosis, MAC)  4) Cystic fibrosis exacerbations     LIPASE - Abnormal    Lipase 35 (*) 73 - 393 u/L Final   LACTIC ACID, PLASMA (W/REFLEX IF RESULT > 2.0) - Normal    LACTIC ACID 1.8  0.5 - 2.0 mmol/L Final    Narrative:     Result may be elevated if tourniquet was used during collection. PROTIME-INR - Normal    Protime 13.4  11.6 - 14.5 seconds Final    INR 1.00  0.84 - 1.19 Final   APTT - Normal    PTT 25  23 - 37 seconds Final    Comment: Therapeutic Heparin Range =  60-90 seconds   HS TROPONIN I 0HR - Normal    hs TnI 0hr 28  "Refer to ACS Flowchart"- see link ng/L Final    Comment:                                              Initial (time 0) result  If >=50 ng/L, Myocardial injury suggested ;  Type of myocardial injury and treatment strategy  to be determined. If 5-49 ng/L, a delta result at 2 hours and or 4 hours will be needed to further evaluate. If <4 ng/L, and chest pain has been >3 hours since onset, patient may qualify for discharge based on the HEART score in the ED.   If <5 ng/L and <3hours since onset of chest pain, a delta result at 2 hours will be needed to further evaluate. HS Troponin 99th Percentile URL of a Health Population=12 ng/L with a 95% Confidence Interval of 8-18 ng/L. Second Troponin (time 2 hours)  If calculated delta >= 20 ng/L,  Myocardial injury suggested ; Type of myocardial injury and treatment strategy to be determined. If 5-49 ng/L and the calculated delta is 5-19 ng/L, consult medical service for evaluation. Continue evaluation for ischemia on ecg and other possible etiology and repeat hs troponin at 4 hours. If delta is <5 ng/L at 2 hours, consider discharge based on risk stratification via the HEART score (if in ED), or ARLEY risk score in IP/Observation. HS Troponin 99th Percentile URL of a Health Population=12 ng/L with a 95% Confidence Interval of 8-18 ng/L.   HS TROPONIN I 2HR - Normal    hs TnI 2hr 21  "Refer to ACS Flowchart"- see link ng/L Final    Comment:                                              Initial (time 0) result  If >=50 ng/L, Myocardial injury suggested ;  Type of myocardial injury and treatment strategy  to be determined. If 5-49 ng/L, a delta result at 2 hours and or 4 hours will be needed to further evaluate. If <4 ng/L, and chest pain has been >3 hours since onset, patient may qualify for discharge based on the HEART score in the ED. If <5 ng/L and <3hours since onset of chest pain, a delta result at 2 hours will be needed to further evaluate. HS Troponin 99th Percentile URL of a Health Population=12 ng/L with a 95% Confidence Interval of 8-18 ng/L. Second Troponin (time 2 hours)  If calculated delta >= 20 ng/L,  Myocardial injury suggested ; Type of myocardial injury and treatment strategy to be determined. If 5-49 ng/L and the calculated delta is 5-19 ng/L, consult medical service for evaluation. Continue evaluation for ischemia on ecg and other possible etiology and repeat hs troponin at 4 hours.   If delta is <5 ng/L at 2 hours, consider discharge based on risk stratification via the HEART score (if in ED), or ARLEY risk score in IP/Observation. HS Troponin 99th Percentile URL of a Health Population=12 ng/L with a 95% Confidence Interval of 8-18 ng/L. Delta 2hr hsTnI -7  <20 ng/L Final   HS TROPONIN I 4HR - Normal    hs TnI 4hr 24  "Refer to ACS Flowchart"- see link ng/L Final    Comment:                                              Initial (time 0) result  If >=50 ng/L, Myocardial injury suggested ;  Type of myocardial injury and treatment strategy  to be determined. If 5-49 ng/L, a delta result at 2 hours and or 4 hours will be needed to further evaluate. If <4 ng/L, and chest pain has been >3 hours since onset, patient may qualify for discharge based on the HEART score in the ED. If <5 ng/L and <3hours since onset of chest pain, a delta result at 2 hours will be needed to further evaluate. HS Troponin 99th Percentile URL of a Health Population=12 ng/L with a 95% Confidence Interval of 8-18 ng/L. Second Troponin (time 2 hours)  If calculated delta >= 20 ng/L,  Myocardial injury suggested ; Type of myocardial injury and treatment strategy to be determined. If 5-49 ng/L and the calculated delta is 5-19 ng/L, consult medical service for evaluation. Continue evaluation for ischemia on ecg and other possible etiology and repeat hs troponin at 4 hours. If delta is <5 ng/L at 2 hours, consider discharge based on risk stratification via the HEART score (if in ED), or ARLEY risk score in IP/Observation. HS Troponin 99th Percentile URL of a Health Population=12 ng/L with a 95% Confidence Interval of 8-18 ng/L. Delta 4hr hsTnI -4  <20 ng/L Final   BLOOD CULTURE    Blood Culture Received in Microbiology Lab. Culture in Progress. Preliminary   BLOOD CULTURE    Blood Culture Received in Microbiology Lab. Culture in Progress.    Preliminary   CLOSTRIDIUM DIFFICILE TOXIN BY PCR WITH EIA   UA W REFLEX TO MICROSCOPIC WITH REFLEX TO CULTURE       Imaging:     CT chest abdomen pelvis w contrast   Final Result by Almaz Milligan MD (06/25 2005)      1. No acute pulmonary findings. 2.  New patulous distention of the upper esophagus. Scattered debris in the esophagus, question related to reflux with esophageal dysfunction not excluded. 3.  New findings suspicious for right-sided pyelonephritis. 4. Moderate compression deformity at L4 is new. Severe compression deformity at L5 is worse from the prior exam previously mild. 5.  Heterogeneous soft tissue fullness at the left upper chest wall adjacent to the left anterior first and second ribs. The overall fullness does appear similar to the prior noncontrast exam, question posttraumatic and related to old hematoma but given    the heterogeneity recommend follow-up contrast chest CT in 3 months. 6.  Bilateral shoulder prostheses identified with chronic bilateral anterior shoulder dislocation. The study was marked in [facility] for immediate notification. Workstation performed: [id number]         XR chest 1 view portable    (Results Pending)          Procedures   ECG 12 Lead Documentation Only    Date/Time: 6/25/2023 5:15 PM    Performed by: Oralia Boyd MD  Authorized by: Oralia Boyd MD    ECG reviewed by me, the ED Provider: yes    Patient location:  ED  Previous ECG:     Previous ECG:  Compared to current    Similarity:  No change  Interpretation:     Interpretation: abnormal      Interpretation comment:  Chronic RBBB  Rate:     ECG rate assessment: normal    Rhythm:     Rhythm: sinus rhythm    Ectopy:     Ectopy: none    QRS:     QRS axis:  Right    QRS intervals:   Wide  Conduction:     Conduction: normal    ST segments:     ST segments:  Normal  T waves:     T waves: normal            MDM:   Medical Decision Making  Suri Morgan is a 77 y.o. who presents with complaints of weakness    Vital signs are afebrile, unremarkable, physical exam shows chronically ill-appearing, cachectic, abdomen mildly tender, however soft no peritoneal signs    Ddx: Overall this patient is quite high risk and the differential is broad. May be related to pancreatitis versus p.o. versus ACS versus bacteremia versus UTI versus gastroenteritis versus other. Broad work-up is warranted. Plan: Workup will include abdominal labs, cardiac panel, CT abdomen pelvis, IVF, Zofran. Will monitor closely and reassess. Reassessment/Disposition: Overall concerning for generalized weakness secondary to likely pyelonephritis in addition to other chronic findings. Given all this, recommend mission for further management and care. Repleted potassium without complication. Started empirically on cefepime. No other complications arose while under my care. Discussed the patient's case with the [facility] service who agreed to admit the patient for further care and evaluation. The patient was also stable throughout their ED course and suffered from no acute changes and had no further questions or concerns from the ED team's standpoint. Amount and/or Complexity of Data Reviewed  Labs: ordered. Decision-making details documented in ED Course. Radiology: ordered and independent interpretation performed. Risk  Prescription drug management. Decision regarding hospitalization. ED Course as of 06/26/23 0008   Caty Jun 25, 2023   1706 WBC(!): 17.46   1707 WBC(!): 17.46   1714 EKG shows no evidence of ischemia, similar to prior   1732 Procalcitonin(!): 3.48   1732 hs TnI 0hr: 28   1758 Potassium(!!): 2.7   1758 Repleting K+   1839 CXR concerning for mediastinal widening similar to prior, however to better differentiate, add on CT chest   1937 Delta 2hr hsTnI: -7   2015 Concerning for pyelo and other chronic findings, will admit for this and further care      Final Dispo   Final Diagnosis:  1. Weakness    2. Pyelonephritis    3. Hypokalemia    4.  Cachexia ([address])      Time reflects when diagnosis was documented in both MDM as applicable and the Disposition within this note     Time User Action Codes Description Comment    6/25/2023  8:31 PM Deni Shayna Add [R53.1] Weakness     6/25/2023  8:31 PM Deni Shayna Add [N12] Pyelonephritis     6/25/2023  8:31 PM Deni Shayna Add [E87.6] Hypokalemia     6/26/2023 12:08 AM Deni Shayna Add [R64] [facility])       ED Disposition     ED Disposition   Admit    Condition   Stable    Date/Time   Sun Jun 25, 2023  8:32 PM    Comment   Case was discussed with NAI and the patient's admission status was agreed to be Admission Status: inpatient status to the service of Dr. Tai Crooks .            Follow-up Information    None       Medications   acetaminophen (TYLENOL) tablet 975 mg (975 mg Oral Given 6/25/23 2208)   cholecalciferol (VITAMIN D3) tablet 2,000 Units (has no administration in time range)   folic acid (FOLVITE) tablet 2 mg (has no administration in time range)   gabapentin (NEURONTIN) capsule 200 mg (200 mg Oral Given 6/25/23 2208)   melatonin tablet 3 mg (3 mg Oral Given 6/25/23 2208)   levothyroxine tablet 75 mcg (has no administration in time range)   methotrexate tablet 7.5 mg (has no administration in time range)   methocarbamol (ROBAXIN) tablet 500 mg (has no administration in time range)   nortriptyline (PAMELOR) capsule 100 mg (100 mg Oral Given 6/25/23 2329)   multivitamin-minerals (CENTRUM) tablet 1 tablet (has no administration in time range)   oxyCODONE (ROXICODONE) split tablet 2.5 mg (has no administration in time range)   rOPINIRole (REQUIP) tablet 2 mg (2 mg Oral Given 6/25/23 2329)   senna-docusate sodium (SENOKOT S) 8.6-50 mg per tablet 1 tablet (1 tablet Oral Given 6/25/23 2329)   polyethylene glycol (MIRALAX) packet 17 g (has no administration in time range)   pantoprazole (PROTONIX) EC tablet 40 mg (has no administration in time range)   ondansetron (ZOFRAN) injection 4 mg (has no administration in time range)   cefTRIAXone (ROCEPHIN) 1,000 mg in dextrose 5 % 50 mL IVPB (has no administration in time range)   heparin (porcine) subcutaneous injection 5,000 Units (5,000 Units Subcutaneous Given 6/25/23 2208)   multi-electrolyte (PLASMALYTE-A/ISOLYTE-S PH 7.4) IV solution (100 mL/hr Intravenous New Bag 6/25/23 2204)   hydroxychloroquine (PLAQUENIL) tablet 200 mg (has no administration in time range)   hydroxychloroquine (PLAQUENIL) tablet 100 mg (100 mg Oral Given 6/25/23 2329)   ondansetron (ZOFRAN) injection 4 mg (4 mg Intravenous Given 6/25/23 1652)   sodium chloride 0.9 % bolus 500 mL (0 mL Intravenous Stopped 6/25/23 1752)   potassium chloride 20 mEq IVPB (premix) (0 mEq Intravenous Stopped 6/25/23 2046)   potassium chloride (K-DUR,KLOR-CON) CR tablet 60 mEq (60 mEq Oral Given 6/25/23 1800)   HYDROmorphone (DILAUDID) injection 0.5 mg (0.5 mg Intravenous Given 6/25/23 1756)   iohexol (OMNIPAQUE) 350 MG/ML injection (SINGLE-DOSE) 70 mL (70 mL Intravenous Given 6/25/23 1843)   cefepime (MAXIPIME) 2 g/50 mL dextrose IVPB (0 mg Intravenous Stopped 6/25/23 2158)       All details of the evaluation and treatment plan were made clear and additionally all questions and concerns were addressed while under my care. Portions of the record may have been created with voice recognition software. Occasional wrong word or "sound a like" substitutions may have occurred due to the inherent limitations of voice recognition software. Read the chart carefully and recognize, using context, where substitutions have occurred. The attending physician physically available and evaluated the above patient alongside myself.         Penny Hashimoto, MD  06/26/23 [id number]

## 2023-06-25 NOTE — ED ATTENDING ATTESTATION
6/25/2023  IElidia DO, saw and evaluated the patient. I have discussed the patient with the resident/non-physician practitioner and agree with the resident's/non-physician practitioner's findings, Plan of Care, and MDM as documented in the resident's/non-physician practitioner's note, except where noted. All available labs and Radiology studies were reviewed. I was present for key portions of any procedure(s) performed by the resident/non-physician practitioner and I was immediately available to provide assistance. At this point I agree with the current assessment done in the Emergency Department. I have conducted an independent evaluation of this patient a history and physical is as follows:    67-year-old presents for weakness for the past 3 days. Nausea vomiting. Diarrhea. History of rheumatoid arthritis on chronic immunosuppression. Chronically dislocated bilateral shoulders. Was recently hospitalized for UTI. Patient arrives hemodynamically stable she is not a great historian but reports symptoms stated above, denies chest pain shortness of breath she has been seen by me personally in the past and seems relatively at her baseline. She does have tenderness in her abdomen.   She is moving all extremities at her baseline no neurologic deficits    Assessment and plan: Weakness, abdominal pain concern for infection versus metabolic derangement versus small bowel obstruction versus anemia, patient is chronically ill, will perform work-up would recommend goals of care discussion after initial evaluation    ED Course         Critical Care Time  Procedures

## 2023-06-25 NOTE — ED NOTES
Patient transported to [address] [place] [address]  06/25/23 [id number] Please see mom's most recent mychart message below.    Please advise, thanks.

## 2023-06-26 LAB
ANION GAP SERPL CALCULATED.3IONS-SCNC: 3 MMOL/L
BACTERIA UR QL AUTO: ABNORMAL /HPF
BILIRUB UR QL STRIP: NEGATIVE
BUN SERPL-MCNC: 14 MG/DL (ref 5–25)
CALCIUM SERPL-MCNC: 8.4 MG/DL (ref 8.3–10.1)
CHLORIDE SERPL-SCNC: 108 MMOL/L (ref 96–108)
CLARITY UR: ABNORMAL
CO2 SERPL-SCNC: 23 MMOL/L (ref 21–32)
COLOR UR: YELLOW
CREAT SERPL-MCNC: 0.48 MG/DL (ref 0.6–1.3)
ERYTHROCYTE [DISTWIDTH] IN BLOOD BY AUTOMATED COUNT: 15.8 % (ref 11.6–15.1)
GFR SERPL CREATININE-BSD FRML MDRD: 93 ML/MIN/1.73SQ M
GLUCOSE SERPL-MCNC: 100 MG/DL (ref 65–140)
GLUCOSE UR STRIP-MCNC: NEGATIVE MG/DL
HCT VFR BLD AUTO: 30.9 % (ref 34.8–46.1)
HGB BLD-MCNC: 9.9 G/DL (ref 11.5–15.4)
HGB UR QL STRIP.AUTO: ABNORMAL
KETONES UR STRIP-MCNC: NEGATIVE MG/DL
LEUKOCYTE ESTERASE UR QL STRIP: ABNORMAL
MCH RBC QN AUTO: 27.3 PG (ref 26.8–34.3)
MCHC RBC AUTO-ENTMCNC: 32 G/DL (ref 31.4–37.4)
MCV RBC AUTO: 85 FL (ref 82–98)
NITRITE UR QL STRIP: POSITIVE
NON-SQ EPI CELLS URNS QL MICRO: ABNORMAL /HPF
PH UR STRIP.AUTO: 6.5 [PH]
PLATELET # BLD AUTO: 209 THOUSANDS/UL (ref 149–390)
PMV BLD AUTO: 9 FL (ref 8.9–12.7)
POTASSIUM SERPL-SCNC: 3.9 MMOL/L (ref 3.5–5.3)
PROCALCITONIN SERPL-MCNC: 3.47 NG/ML
PROT UR STRIP-MCNC: ABNORMAL MG/DL
RBC # BLD AUTO: 3.63 MILLION/UL (ref 3.81–5.12)
RBC #/AREA URNS AUTO: ABNORMAL /HPF
SODIUM SERPL-SCNC: 134 MMOL/L (ref 135–147)
SP GR UR STRIP.AUTO: >=1.05 (ref 1–1.03)
UROBILINOGEN UR STRIP-ACNC: <2 MG/DL
WBC # BLD AUTO: 15.07 THOUSAND/UL (ref 4.31–10.16)
WBC #/AREA URNS AUTO: ABNORMAL /HPF
WBC CLUMPS # UR AUTO: PRESENT /UL

## 2023-06-26 PROCEDURE — 87086 URINE CULTURE/COLONY COUNT: CPT | Performed by: INTERNAL MEDICINE

## 2023-06-26 PROCEDURE — 36415 COLL VENOUS BLD VENIPUNCTURE: CPT | Performed by: INTERNAL MEDICINE

## 2023-06-26 PROCEDURE — 80048 BASIC METABOLIC PNL TOTAL CA: CPT | Performed by: INTERNAL MEDICINE

## 2023-06-26 PROCEDURE — 81001 URINALYSIS AUTO W/SCOPE: CPT | Performed by: INTERNAL MEDICINE

## 2023-06-26 PROCEDURE — 84145 PROCALCITONIN (PCT): CPT | Performed by: INTERNAL MEDICINE

## 2023-06-26 PROCEDURE — 85027 COMPLETE CBC AUTOMATED: CPT | Performed by: INTERNAL MEDICINE

## 2023-06-26 PROCEDURE — 99232 SBSQ HOSP IP/OBS MODERATE 35: CPT | Performed by: PHYSICIAN ASSISTANT

## 2023-06-26 PROCEDURE — C9113 INJ PANTOPRAZOLE SODIUM, VIA: HCPCS | Performed by: PHYSICIAN ASSISTANT

## 2023-06-26 PROCEDURE — 92610 EVALUATE SWALLOWING FUNCTION: CPT

## 2023-06-26 RX ORDER — HYDROMORPHONE HCL IN WATER/PF 6 MG/30 ML
0.2 PATIENT CONTROLLED ANALGESIA SYRINGE INTRAVENOUS EVERY 6 HOURS PRN
Status: DISCONTINUED | OUTPATIENT
Start: 2023-06-26 | End: 2023-06-27

## 2023-06-26 RX ORDER — PANTOPRAZOLE SODIUM 40 MG/10ML
40 INJECTION, POWDER, LYOPHILIZED, FOR SOLUTION INTRAVENOUS
Status: DISCONTINUED | OUTPATIENT
Start: 2023-06-26 | End: 2023-07-08 | Stop reason: HOSPADM

## 2023-06-26 RX ADMIN — DICLOFENAC SODIUM 2 G: 10 GEL TOPICAL at 18:33

## 2023-06-26 RX ADMIN — LEVOTHYROXINE SODIUM 75 MCG: 75 TABLET ORAL at 05:38

## 2023-06-26 RX ADMIN — PANTOPRAZOLE SODIUM 40 MG: 40 TABLET, DELAYED RELEASE ORAL at 05:38

## 2023-06-26 RX ADMIN — HYDROMORPHONE HYDROCHLORIDE 0.2 MG: 0.2 INJECTION, SOLUTION INTRAMUSCULAR; INTRAVENOUS; SUBCUTANEOUS at 13:40

## 2023-06-26 RX ADMIN — CEFTRIAXONE SODIUM 1000 MG: 10 INJECTION, POWDER, FOR SOLUTION INTRAVENOUS at 10:36

## 2023-06-26 RX ADMIN — HEPARIN SODIUM 5000 UNITS: 5000 INJECTION INTRAVENOUS; SUBCUTANEOUS at 13:40

## 2023-06-26 RX ADMIN — HEPARIN SODIUM 5000 UNITS: 5000 INJECTION INTRAVENOUS; SUBCUTANEOUS at 05:38

## 2023-06-26 RX ADMIN — ACETAMINOPHEN 975 MG: 325 TABLET ORAL at 05:38

## 2023-06-26 RX ADMIN — Medication 2.5 MG: at 05:40

## 2023-06-26 RX ADMIN — CEFTRIAXONE 1000 MG: 10 INJECTION, POWDER, FOR SOLUTION INTRAVENOUS at 23:19

## 2023-06-26 RX ADMIN — SODIUM CHLORIDE, SODIUM GLUCONATE, SODIUM ACETATE, POTASSIUM CHLORIDE, MAGNESIUM CHLORIDE, SODIUM PHOSPHATE, DIBASIC, AND POTASSIUM PHOSPHATE 100 ML/HR: .53; .5; .37; .037; .03; .012; .00082 INJECTION, SOLUTION INTRAVENOUS at 18:33

## 2023-06-26 RX ADMIN — PANTOPRAZOLE SODIUM 40 MG: 40 INJECTION, POWDER, FOR SOLUTION INTRAVENOUS at 13:40

## 2023-06-26 NOTE — ASSESSMENT & PLAN NOTE
· Sodium 131 on admission, possibly due to poor recent oral intake and nausea/vomiting  · Continue with IV fluids, recheck BMP  · Improving with IVF

## 2023-06-26 NOTE — CASE MANAGEMENT
Case Management Assessment & Discharge Planning Note    Patient name Erick Fox  Location [facility] 926/[facility] 926-01 MRN [id number]  : 1945 Date 2023       Current Admission Date: 2023  Current Admission Diagnosis:Generalized weakness   Patient Active Problem List    Diagnosis Date Noted   • Possible pyelonephritis 2023   • Nausea and vomiting 2023   • Hypokalemia 2023   • Hypomagnesemia 2023   • Generalized weakness 2023   • Acute cystitis with hematuria 2023   • Urinary retention 2023   • Immunodeficiency due to drugs ([address]) 2023   • Chronic venous hypertension (idiopathic) with ulcer of left lower extremity (CODE) ([address]) 2023   • Diabetic ulcer of left midfoot associated with type 2 diabetes mellitus, with bone involvement without evidence of necrosis ([address]) 2023   • Other diseases of thymus ([address]) 2023   • Protein calorie malnutrition ([address]) 2023   • Weight loss 2023   • Acute encephalopathy 2023   • Polypharmacy 2023   • Hypercalcemia 2023   • Compression fracture of L5 vertebra with routine healing 2023   • Acute low back pain 2023   • Effusion of right shoulder joint 2023   • Arm bruise, right, initial encounter 2023   • Concern for cerebral contusion 2023   • 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   • Non-healing open wound of heel, initial encounter 10/12/2022   • Anemia 2022   • Pressure injury of left foot, stage 4 ([address])    • Hyponatremia 2022   • Venous insufficiency 2021   • Encounter for annual wellness exam in Medicare patient 2021   • S/P reverse total shoulder arthroplasty, left 2021   • Rupture long head biceps tendon, left, initial encounter 2021   • Shoulder dislocation 2021   • Depression 2020   • Other forms of systemic lupus erythematosus ([facility]) 2020   • Vitamin D deficiency 07/24/2019   • Chronic pain syndrome 04/18/2019   • Rheumatoid arthritis involving multiple sites with positive rheumatoid factor ([address]) 03/04/2019   • Ambulatory dysfunction 11/13/2018   • Closed compression fracture of L3 lumbar vertebra 11/13/2018   • S/P total hip arthroplasty 11/27/2017   • Anxiety 08/30/2017   • Urinary tract infection without hematuria 08/30/2017   • RLS (restless legs syndrome) 08/30/2017   • RBBB 08/23/2017   • Age-related osteoporosis with current pathological fracture with routine healing 04/18/2017   • Hypothyroidism due to Hashimoto's thyroiditis 12/13/2016      LOS (days): 1  Geometric Mean LOS (GMLOS) (days): 2.80  Days to GMLOS:2.3     OBJECTIVE:  PATIENT READMITTED [address] of Unplanned Readmission Score: 37.12         Current admission status: Inpatient       Preferred Pharmacy:   [facility], [address] [place] [address]  Phone: [phone] Fax: [address], [address] [place] [address]  Phone: [phone] Fax: [phone]    Primary Care Provider: Trudi Barnett MD    Primary Insurance: MEDICARE  Secondary Insurance:     ASSESSMENT:  [facility] Proxies    There are no active Health Care Proxies on file. Readmission Root Cause  30 Day Readmission: Yes  Who directed you to return to the hospital?: Self  During previous admission, was a post-acute recommendation made?: Yes  What post-acute resources were offered?: STR (Pt declined STR and discharged with  [facility])  Patient was readmitted due to: Generalized weakness  Action Plan: PT/OT recommendations    Patient Information  Admitted from[de-identified] Home  Mental Status: Alert  During Assessment patient was accompanied by:  Other-Comment (Pt is a 30-day readmit)  Assessment information provided by[de-identified] Other - please comment (Pt is a 30-day readmit)           DISCHARGE DETAILS:          Other Referral/Resources/Interventions Provided:  Referral Comments: Pending recs         Treatment Team Recommendation: Other (TBD)  Discharge Destination Plan[de-identified] Other (TBD)           Additional Comments: Pt is a 30-day readmission. Please see assessment completed on 6/1/2023. CM to follow for PT/OT recommendations.

## 2023-06-26 NOTE — ASSESSMENT & PLAN NOTE
· Potassium 2.7 on admission, replacement initiated during ED evaluation.   Possibly due to the nausea/vomiting and poor oral intake  · Recheck BMP along with magnesium level and continue with replacement as needed  · Continue telemetry monitoring until potassium at least 3

## 2023-06-26 NOTE — ASSESSMENT & PLAN NOTE
Malnutrition Findings:       Nutrition consult                          BMI Findings: There is no height or weight on file to calculate BMI.

## 2023-06-26 NOTE — H&P
[address] [facility]  H&P  Name: Jaky Jacobo 77 y.o. female I MRN: [id number]  Unit/Bed#: ED 23 I Date of Admission: 6/25/2023   Date of Service: 6/25/2023 I Hospital Day: 0      Assessment/Plan   * Generalized weakness  Assessment & Plan  · Presenting with increased weakness over the past several days with generalized malaise, 3 days nausea/vomiting as well as intermittent diarrhea. Of note was recently hospitalized here also with generalized weakness and acute cystitis, appears rehab was recommended by PT/OT however patient refused this and went home with home therapies. · Possibly multifactorial in the setting of electrolyte abnormalities, possible pyelonephritis, and a nausea/vomiting with possible reflux with esophageal dysfunction as below. Management of these individual problems as per associated plans  · PT/OT evaluation will be requested    Possible pyelonephritis  Assessment & Plan  · Was treated for acute cystitis during recent admission, CT ab/pelvis now with findings suspicious for right pyelonephritis. Patient without complaints of right side abdominal/flank pain and with benign examination. UA is pending. Elevated procalcitonin and WBC is noted  · Received cefepime during ED evaluation, will continue with ceftriaxone for now pending the result of blood cultures x2 along with UA/urine culture  · Trend WC, temperature curve, hemodynamics    Nausea and vomiting  Assessment & Plan  · 3 days of nausea/vomiting per patient, CT abdomen/pelvis with concerns for right-sided pyelonephritis along with distention of upper esophagus with scattered degree in esophagus for which relation to reflux with esophageal dysfunction was not excluded  · Management of the pyelonephritis as above. Start Protonix for the potential reflux and assess response.   · Nursing dysphagia assessment, if fails will consult speech  · Otherwise continue with IV fluids, antiemetics    Hypokalemia  Assessment & Plan  · Potassium 2.7 on admission, replacement initiated during ED evaluation. Possibly due to the nausea/vomiting and poor oral intake  · Recheck BMP along with magnesium level and continue with replacement as needed  · Continue telemetry monitoring until potassium at least 3    Compression fracture of L5 vertebra with routine healing  Assessment & Plan  · Again noted on CT imaging worsened from prior examination, additionally with marked compression deformity at L4. Patient denying any increase in back pain from baseline  · We will continue to monitor exam and pain, if increases further work-up as appropriate    Hyponatremia  Assessment & Plan  · Sodium 131 on admission, possibly due to poor recent oral intake and nausea/vomiting  · Continue with IV fluids, recheck BMP    Chronic pain syndrome  Assessment & Plan  · Patient reporting no increase in chronic pain at this time  · PDMP reviewed, we will continue with low-dose oxycodone as needed    Rheumatoid arthritis involving multiple sites with positive rheumatoid factor (HCC)  Assessment & Plan  · Continue home dose Plaquenil 200 mg in a.m. and 100 mg in PM, methotrexate 7.5 mg every Thursday    Hypothyroidism due to Hashimoto's thyroiditis  Assessment & Plan  · Continue home dose levothyroxine         VTE Prophylaxis: Heparin  / sequential compression device   Code Status: Level 1 - Full Code  POLST: POLST form is not discussed and not completed at this time. Discussion with family: Patient declines at this time    Anticipated Length of Stay:  Patient will be admitted on an Inpatient basis with an anticipated length of stay of greater than 2 midnights. Justification for Hospital Stay: Please see detailed plans noted above. Chief Complaint:     Generalized weakness  History of Present Illness:  Jeni Herrera is a 77 y.o. female who presented for evaluation of generalized weakness.   Of note was hospitalized here earlier this month 6/1/2023 - 6/3/2023 with generalized weakness and fever found with acute cystitis and completed course of ceftriaxone prior to discharge, additionally home tramadol was discontinued and patient seen by PT/OT who recommended acute rehab however patient declined this and was discharged to home with home services. Since discharge it appears referral to hospice was made by PCP given concerns that patient may not be able to participate in rehab, however appears patient declined this. For the past several days however she developed increasing generalized weakness described as a generalized malaise, and for the past 3 days with several episodes of nonbloody/nonbilious nausea/vomiting with difficulty tolerating oral intake additionally with intermittent episodes of watery diarrhea, 1-2 episodes per day. She denied any fever/chills, known sick contacts, recent travel, chest pain/pressure, shortness of breath, back pain, or focal weakness. Today due to the degree of symptoms she presented for evaluation. During ED evaluation labs were obtained revealing hyponatremia and hypokalemia for which IV fluids and potassium replacement were initiated. A CT chest/abdomen/pelvis was performed which was negative for pulmonary abnormality but revealed concerns for distention of the upper esophagus for which question of whether this is related to reflux with esophageal dysfunction was not excluded, possible right-sided pyelonephritis, and more compression deformity at L4 that was new along with progression of known compression deformity at L5 to severe. She was started on broad-spectrum antibiotic and is admitted for further management.     Review of Systems:    Constitutional:  Denies fever or chills but reported fatigue and generalized weakness  Eyes:  Denies change in visual acuity   HENT:  Denies nasal congestion or sore throat   Respiratory:  Denies cough or shortness of breath   Cardiovascular:  Denies chest pain or edema   GI:  Denies abdominal pain, but reported nausea, vomiting, diarrhea  :  Denies dysuria   Musculoskeletal:  Denies back pain or joint pain   Integument:  Denies rash   Neurologic:  Denies headache, focal weakness or sensory changes   Endocrine:  Denies polyuria or polydipsia   Lymphatic:  Denies swollen glands   Psychiatric:  Denies depression or anxiety     Past Medical and Surgical History:   Past Medical History:   Diagnosis Date   • Acute blood loss anemia 9/9/2020   • Aftercare following joint replacement 9/9/2020    Patient had right reversed total shoulder arthroplasty.  Pain was controlled when he left the hospital.     • Anxiety    • Arthritis    • Cellulitis of left lower extremity 11/30/2019   • Depression    • Disease of thyroid gland     HYPO   • Dyspepsia 11/12/2019   • Dysphagia 2/6/2023   • Femur neck fracture (HCC)    • GERD (gastroesophageal reflux disease)    • Hyperthyroidism     RESOLVED: [id number]   • Hyponatremia 7/25/2022   • Leg pain 2/6/2023   • Osteoporosis    • Polio    • PVD (peripheral vascular disease) ([address])    • Right BBB/left ant fasc block    • Shoulder pain, bilateral 7/27/2021   • UTI (urinary tract infection)    • Varicella infection     LAST ASSESSED: [id number]     Past Surgical History:   Procedure Laterality Date   • APPENDECTOMY     • HIP ARTHROPLASTY Left 11/27/2017    Procedure: REMOVAL IM NAIL CONVERSION TO TOTAL HIP ARTHROPLASTY POSTERIOR APPROACH;  Surgeon: Nai Sharma MD;  Location: [facility];  Service: Orthopedics   • HIP FRACTURE SURGERY     • HIP SURGERY      both hips replaced;2013 left ,2014 right   • JOINT REPLACEMENT Right     HIP TOTAL   • IN ARTHROPLASTY GLENOHUMERAL JOINT TOTAL SHOULDER Right 9/2/2020    Procedure: ARTHROPLASTY SHOULDER REVERSE;  Surgeon: Suzie Benito MD;  Location: [facility];  Service: Orthopedics   • IN ARTHROPLASTY GLENOHUMERAL JOINT TOTAL SHOULDER Left 6/1/2021    Procedure: ARTHROPLASTY SHOULDER REVERSE; Surgeon: Arleth Kinsey MD;  Location: [facility];  Service: Orthopedics   • AL CONV PREV HIP TOT HIP ARTHRP W/WO AGRFT/ALGRFT Left 10/4/2017    Procedure: Jerrod Erps removal of left hip;  Surgeon: Mercy Bernard MD;  Location: [facility];  Service: Orthopedics   • AL [address] SURG W/RLS TRANSVRS CARPL LIGM Right 5/24/2022    Procedure: RELEASE CARPAL TUNNEL ENDOSCOPIC-right;  Surgeon: Aminta Gomez MD;  Location: [facility];  Service: Orthopedics   • REVISION TOTAL HIP ARTHROPLASTY Right    • TOE AMPUTATION Left 7/24/2022    Procedure: 5TH METATARSAL RESECTION WITH BOTTOM FLAP CLOSURE;  Surgeon: Awais Haider DPM;  Location: [facility];  Service: Podiatry   • TONSILLECTOMY         Meds/Allergies:    Current Facility-Administered Medications:   •  acetaminophen (TYLENOL) tablet 975 mg, 975 mg, Oral, Q8H [address], Carisa Sun DO, 975 mg at 06/25/23 2208  •  [START ON 6/26/2023] cefTRIAXone (ROCEPHIN) 1,000 mg in dextrose 5 % 50 mL IVPB, 1,000 mg, Intravenous, Q24H, Carisa Sun DO  •  [START ON 6/26/2023] cholecalciferol (VITAMIN D3) tablet 2,000 Units, 2,000 Units, Oral, Daily, Carisa Sun DO  •  [START ON 3/19/2739] folic acid (FOLVITE) tablet 2 mg, 2 mg, Oral, Daily, Carisa Sun DO  •  gabapentin (NEURONTIN) capsule 200 mg, 200 mg, Oral, TID, Carisa Sun DO, 200 mg at 06/25/23 2208  •  heparin (porcine) subcutaneous injection 5,000 Units, 5,000 Units, Subcutaneous, Q8H [address], Carisa Sun DO, 5,000 Units at 06/25/23 2208  •  hydroxychloroquine (PLAQUENIL) tablet 200 mg, 200 mg, Oral, See Admin Instructions, Carisa Sun DO  •  [START ON 6/26/2023] levothyroxine tablet 75 mcg, 75 mcg, Oral, Early Morning, Arvada Garre, DO  •  melatonin tablet 3 mg, 3 mg, Oral, HS, Carisa Sun DO, 3 mg at 06/25/23 2208  •  methocarbamol (ROBAXIN) tablet 500 mg, 500 mg, Oral, Daily PRN, Carisa Sun DO  •  [START ON 6/29/2023] methotrexate tablet 7.5 mg, 7.5 mg, Oral, Weekly, Carisa Sun, DO  •  multi-electrolyte (PLASMALYTE-A/ISOLYTE-S PH 7.4) IV solution, 100 mL/hr, Intravenous, Continuous, Jaylin Palacios DO, Last Rate: 100 mL/hr at 06/25/23 2204, 100 mL/hr at 06/25/23 2204  •  [START ON 6/26/2023] multivitamin-minerals (CENTRUM) tablet 1 tablet, 1 tablet, Oral, Daily, Jaylin Palacios DO  •  nortriptyline (PAMELOR) capsule 100 mg, 100 mg, Oral, HS, Jaylin Palacios, DO  •  ondansetron (ZOFRAN) injection 4 mg, 4 mg, Intravenous, Q6H PRN, Jaylin Palacios DO  •  oxyCODONE (ROXICODONE) split tablet 2.5 mg, 2.5 mg, Oral, Q6H PRN, Jaylin Palacios, DO  •  [START ON 6/26/2023] pantoprazole (PROTONIX) EC tablet 40 mg, 40 mg, Oral, Early Morning, Jaylin Palacios, DO  •  [START ON 6/26/2023] polyethylene glycol (MIRALAX) packet 17 g, 17 g, Oral, Daily, Jaylin Palacios, DO  •  rOPINIRole (REQUIP) tablet 2 mg, 2 mg, Oral, HS, Jaylin Palacios, DO  •  senna-docusate sodium (SENOKOT S) 8.6-50 mg per tablet 1 tablet, 1 tablet, Oral, BID, Jaylin Palacios, DO    Current Outpatient Medications:   •  acetaminophen (TYLENOL) 650 mg CR tablet, Take 2 tablets (1,300 mg total) by mouth 3 (three) times a day, Disp: 30 tablet, Rfl: 0  •  Cholecalciferol 50 MCG (2000 UT) TBDP, Take 1 tablet (2,000 Units total) by mouth daily, Disp: 90 tablet, Rfl: 1  •  folic acid (FOLVITE) 1 mg tablet, Take 2 mg by mouth daily, Disp: , Rfl:   •  gabapentin (NEURONTIN) 100 mg capsule, Take 2 capsules (200 mg total) by mouth 3 (three) times a day, Disp: 90 capsule, Rfl: 1  •  hydroxychloroquine (PLAQUENIL) 200 mg tablet, Take 1 tablet (200 mg total) by mouth every morning AND 0.5 tablets (100 mg total) daily at bedtime.  1 tab in am 1/2 tab in pm., Disp: 135 tablet, Rfl: 1  •  levothyroxine 75 mcg tablet, Take 1 tablet (75 mcg total) by mouth daily in the early morning, Disp: 90 tablet, Rfl: 1  •  lidocaine (LMX) 4 % cream, Apply topically as needed for mild pain, Disp: 30 g, Rfl: 0  •  melatonin 3 mg, Take 1 tablet (3 mg total) by mouth daily at bedtime, Disp: 30 tablet, Rfl: 0  •  methocarbamol (ROBAXIN) 500 mg tablet, Take 1 tablet (500 mg total) by mouth daily as needed for muscle spasms, Disp: 30 tablet, Rfl: 3  •  methotrexate 2.5 MG tablet, 3 tablets by mouth once a week on Thursday, Disp:  , Rfl:   •  Multiple Vitamins-Minerals (CENTRUM SILVER PO), Take 1 tablet by mouth daily, Disp: , Rfl:   •  nortriptyline (PAMELOR) 50 mg capsule, Take 2 capsules (100 mg total) by mouth daily at bedtime, Disp: 60 capsule, Rfl: 2  •  oxyCODONE (ROXICODONE) 5 immediate release tablet, Take 0.5 tablets (2.5 mg total) by mouth every 6 (six) hours as needed for severe pain Max Daily Amount: 10 mg, Disp: 10 tablet, Rfl: 0  •  polyethylene glycol (MIRALAX) 17 g packet, Take 17 g by mouth daily Do not start before April 18, 2023., Disp: , Rfl: 0  •  rOPINIRole (REQUIP) 2 mg tablet, Take 1 tablet (2 mg total) by mouth daily at bedtime, Disp: 90 tablet, Rfl: 1  •  senna-docusate sodium (SENOKOT S) 8.6-50 mg per tablet, Take 1 tablet by mouth 2 (two) times a day, Disp: 20 tablet, Rfl: 0    Allergies: No Known Allergies  History:  Marital Status:      Substance Use History:   Social History     Substance and Sexual Activity   Alcohol Use Not Currently     Social History     Tobacco Use   Smoking Status Former   Smokeless Tobacco Never   Tobacco Comments    quit 20-30 years ago     Social History     Substance and Sexual Activity   Drug Use Not Currently       Family History:  Family History   Problem Relation Age of Onset   • Rheum arthritis Mother    • Rheum arthritis Sister    • Prostate cancer Brother        Physical Exam:     Vitals:   Blood Pressure: (!) 176/82 (06/25/23 2115)  Pulse: 98 (06/25/23 2115)  Temperature: 98.4 °F (36.9 °C) (06/25/23 1559)  Temp Source: Oral (06/25/23 1559)  Respirations: 18 (06/25/23 2115)  SpO2: 94 % (06/25/23 2115)    Constitutional: Frail and cachectic appearing, no acute distress, non-toxic appearance   Eyes:  PERRL, conjunctiva normal   HENT:  Atraumatic, external ears normal, nose normal, oropharynx moist, no pharyngeal exudates. Neck- normal range of motion, no tenderness, supple   Respiratory:  No respiratory distress, normal breath sounds, no rales, no wheezing   Cardiovascular:  Normal rate, normal rhythm, no murmurs, no gallops, no rubs   GI:  Soft, nondistended, normal bowel sounds, nontender, no organomegaly, no mass, no rebound, no guarding   :  No costovertebral angle tenderness   Musculoskeletal:  No edema, no tenderness, no deformities. Back- no tenderness  Integument:  Well hydrated, no rash   Lymphatic:  No lymphadenopathy noted   Neurologic:  Alert &awake, communicative, CN 2-12 normal, normal motor function, normal sensory function, no focal deficits noted   Psychiatric:  Speech and behavior appropriate       Lab Results: I have personally reviewed pertinent reports. Results from last 7 days   Lab Units 06/25/23  1649   WBC Thousand/uL 17.46*   HEMOGLOBIN g/dL 11.3*   HEMATOCRIT % 36.7   PLATELETS Thousands/uL 241   NEUTROS PCT % 86*   LYMPHS PCT % 3*   MONOS PCT % 10   EOS PCT % 0     Results from last 7 days   Lab Units 06/25/23  1649   POTASSIUM mmol/L 2.7*   CHLORIDE mmol/L 102   CO2 mmol/L 23   BUN mg/dL 15   CREATININE mg/dL 0.48*   CALCIUM mg/dL 8.8   ALK PHOS U/L 130*   ALT U/L 24   AST U/L 19     Results from last 7 days   Lab Units 06/25/23  1649   INR  1.00       EKG: Normal sinus rhythm HR 96, RBBB    Imaging: I have personally reviewed pertinent reports. CT chest abdomen pelvis w contrast    Result Date: 6/25/2023  Narrative: CT CHEST, ABDOMEN AND PELVIS WITH IV CONTRAST INDICATION:   Abdominal pain, acute, nonlocalized n/v, abd pain, cachetic. COMPARISON: CT abdomen pelvis 4/13/2023, MRI lumbar spine 4/15/2023, CT chest 10/22/2022. TECHNIQUE: CT examination of the chest, abdomen and pelvis was performed. Multiplanar 2D reformatted images were created from the source data.  This examination, like all CT scans performed in the [facility], was performed utilizing techniques to minimize radiation dose exposure, including the use of iterative reconstruction and automated exposure control. Radiation dose length product (DLP) for this visit:  362.5 mGy-cm IV Contrast:  70 mL of iohexol (OMNIPAQUE) Enteric Contrast: Enteric contrast was not administered. FINDINGS: CHEST LUNGS: Mild biapical pleural-parenchymal changes. Scattered atelectatic changes in the bilateral lower lung fields. Generalized volume loss. No focal infiltrate. Airways are patent PLEURA:  Unremarkable. HEART/GREAT VESSELS: Heart is unremarkable for patient's age. No thoracic aortic aneurysm. MEDIASTINUM AND TOMASA: New patulous distention of the upper esophagus. Scattered debris in the esophagus, question related to reflux with esophageal dysfunction not excluded. No significant intrathoracic lymphadenopathy. CHEST WALL AND LOWER NECK: Heterogeneous soft tissue fullness at the left upper chest wall adjacent to the left anterior first and second ribs does appear similar to the prior noncontrast exam, question posttraumatic. ABDOMEN LIVER/BILIARY TREE:  Unremarkable. GALLBLADDER:  No calcified gallstones. No pericholecystic inflammatory change. SPLEEN:  Unremarkable. PANCREAS:  Unremarkable. ADRENAL GLANDS:  Unremarkable. KIDNEYS/URETERS: No hydronephrosis. Large nonobstructing calculus at the right renal pelvis measuring up to 2 cm in size, larger previously 1.5 cm. A few system hypodensities, too small to characterize. Right kidney demonstrates a patchy heterogeneous appearance extending to the periphery. In addition there is new mild right perinephric fluid and perinephric fat stranding suspicious for pyelonephritis. STOMACH AND BOWEL: Small to moderate size hiatal hernia. No acute findings. APPENDIX:  No findings to suggest appendicitis. ABDOMINOPELVIC CAVITY:  No ascites. No pneumoperitoneum. No lymphadenopathy.  VESSELS: Atherosclerotic changes are present. No evidence of aneurysm. PELVIS Limited assessment due to hip replacement hardware. REPRODUCTIVE ORGANS: Not definitely seen. Correlate for prior hysterectomy. URINARY BLADDER: Not well visualized. ABDOMINAL WALL/INGUINAL REGIONS: Postsurgical changes at the intra-abdominal wall. OSSEOUS STRUCTURES: Severe compression deformity at L3 is stable. Moderate compression deformity at L4 is new. Severe compression deformity at L5 is worse from the prior exam. Bilateral shoulder prostheses identified with chronic bilateral anterior shoulder dislocation. Status post bilateral hip replacement. Impression: 1. No acute pulmonary findings. 2.  New patulous distention of the upper esophagus. Scattered debris in the esophagus, question related to reflux with esophageal dysfunction not excluded. 3.  New findings suspicious for right-sided pyelonephritis. 4. Moderate compression deformity at L4 is new. Severe compression deformity at L5 is worse from the prior exam previously mild. 5.  Heterogeneous soft tissue fullness at the left upper chest wall adjacent to the left anterior first and second ribs. The overall fullness does appear similar to the prior noncontrast exam, question posttraumatic and related to old hematoma but given  the heterogeneity recommend follow-up contrast chest CT in 3 months. 6.  Bilateral shoulder prostheses identified with chronic bilateral anterior shoulder dislocation. The study was marked in [facility] for immediate notification. Workstation performed: [id number]     XR chest portable    Result Date: 6/2/2023  Narrative: CHEST INDICATION:   cough, tachypnea. Weakness COMPARISON: 02/06/2023 EXAM PERFORMED/VIEWS:  XR CHEST PORTABLE 1 image FINDINGS: Cardiomediastinal silhouette appears enlarged. Chronic changes are present. Right upper lobe scarring. Pulmonary vessels are normal. No pneumothorax or pleural effusion. Multiple rib deformities.  Bilateral reverse total shoulder arthroplasties. Bilateral anterior shoulder dislocations, new on the left compared to 02/06/2023. Chronic AC joint separation on the right. Impression: No acute cardiopulmonary disease. Workstation performed: [id number]         ** Please Note: Dragon 360 Dictation voice to text software was used in the creation of this document.  **

## 2023-06-26 NOTE — PLAN OF CARE
Problem: Potential for Falls  Goal: Patient will remain free of falls  Description: INTERVENTIONS:  - Educate patient/family on patient safety including physical limitations  - Instruct patient to call for assistance with activity   - Consult OT/PT to assist with strengthening/mobility   - Keep Call bell within reach  - Keep bed low and locked with side rails adjusted as appropriate  - Keep care items and personal belongings within reach  - Initiate and maintain comfort rounds  - Make Fall Risk Sign visible to staff  - Apply yellow socks and bracelet for high fall risk patients  - Consider moving patient to room near nurses station  Outcome: Progressing     Problem: MOBILITY - ADULT  Goal: Maintain or return to baseline ADL function  Description: INTERVENTIONS:  -  Assess patient's ability to carry out ADLs; assess patient's baseline for ADL function and identify physical deficits which impact ability to perform ADLs (bathing, care of mouth/teeth, toileting, grooming, dressing, etc.)  - Assess/evaluate cause of self-care deficits   - Assess range of motion  - Assess patient's mobility; develop plan if impaired  - Assess patient's need for assistive devices and provide as appropriate  - Encourage maximum independence but intervene and supervise when necessary  - Involve family in performance of ADLs  - Assess for home care needs following discharge   - Consider OT consult to assist with ADL evaluation and planning for discharge  - Provide patient education as appropriate  Outcome: Progressing

## 2023-06-26 NOTE — ASSESSMENT & PLAN NOTE
· Patient reporting no increase in chronic pain at this time  · PDMP reviewed, we will continue with low-dose oxycodone as needed  · IV Dilaudid while NPO  · Patient c/o bilateral heel pain  - Voltaren gel to heals as well

## 2023-06-26 NOTE — ASSESSMENT & PLAN NOTE
· Nursing reports patient unable to swallow  · Patient c/o sore throat - possibly from vomiting  · Speech eval

## 2023-06-26 NOTE — ASSESSMENT & PLAN NOTE
· Patient reporting no increase in chronic pain at this time  · PDMP reviewed, we will continue with low-dose oxycodone as needed

## 2023-06-26 NOTE — PROGRESS NOTES
[address] [facility]  Progress Note  Name: Pilar Mcdaniels I  MRN: [id number]  Unit/Bed#: [facility] 926-01 I Date of Admission: 6/25/2023   Date of Service: 6/26/2023 I Hospital Day: 1    Assessment/Plan   * Generalized weakness  Assessment & Plan  · Presenting with increased weakness over the past several days with generalized malaise, 3 days nausea/vomiting as well as intermittent diarrhea. Of note was recently hospitalized here also with generalized weakness and acute cystitis, appears rehab was recommended by PT/OT however patient refused this and went home with home therapies. · Possibly multifactorial in the setting of electrolyte abnormalities, possible pyelonephritis, and a nausea/vomiting with possible reflux with esophageal dysfunction as below. Management of these individual problems as per associated plans  · PT/OT evaluation will be requested    Possible pyelonephritis  Assessment & Plan  · Was treated for acute cystitis during recent admission, CT ab/pelvis now with findings suspicious for right pyelonephritis. Patient without complaints of right side abdominal/flank pain and with benign examination. UA with WBCs and bacteria. Elevated procalcitonin and WBC is noted  · Received cefepime during ED evaluation, will continue with ceftriaxone for now pending the result of blood cultures x2 along with UA/urine culture  · Trend WC, temperature curve, hemodynamics    Dysphagia  Assessment & Plan  · Nursing reports patient unable to swallow  · Patient c/o sore throat - possibly from vomiting  · Speech eval    Nausea and vomiting  Assessment & Plan  · 3 days of nausea/vomiting per patient, CT abdomen/pelvis with concerns for right-sided pyelonephritis along with distention of upper esophagus with scattered degree in esophagus for which relation to reflux with esophageal dysfunction was not excluded  · Management of the pyelonephritis as above.   Start IV Protonix for the potential reflux and assess response. · Nursing dysphagia assessment, if fails will consult speech  · Otherwise continue with IV fluids, antiemetics    Hypokalemia  Assessment & Plan  · Potassium 2.7 on admission, replacement initiated during ED evaluation. Possibly due to the nausea/vomiting and poor oral intake  · continue with replacement as needed    Hyponatremia  Assessment & Plan  · Sodium 131 on admission, possibly due to poor recent oral intake and nausea/vomiting  · Continue with IV fluids, recheck BMP  · Improving with IVF    Chronic pain syndrome  Assessment & Plan  · Patient reporting no increase in chronic pain at this time  · PDMP reviewed, we will continue with low-dose oxycodone as needed  · IV Dilaudid while NPO  · Patient c/o bilateral heel pain  - Voltaren gel to heals as well    Rheumatoid arthritis involving multiple sites with positive rheumatoid factor (HCC)  Assessment & Plan  · Continue home dose Plaquenil 200 mg in a.m. and 100 mg in PM, methotrexate 7.5 mg every Thursday    Compression fracture of L5 vertebra with routine healing  Assessment & Plan  · Again noted on CT imaging worsened from prior examination, additionally with marked compression deformity at L4. Patient denying any increase in back pain from baseline  · We will continue to monitor exam and pain, if increases further work-up as appropriate    Protein calorie malnutrition [facility])  Assessment & Plan  Malnutrition Findings:       Nutrition consult                          BMI Findings: There is no height or weight on file to calculate BMI. VTE Pharmacologic Prophylaxis: VTE Score: 6 High Risk (Score >/= 5) - Pharmacological DVT Prophylaxis Ordered: heparin. Sequential Compression Devices Ordered. Patient Centered Rounds: I performed bedside rounds with nursing staff today.   Discussions with Specialists or Other Care Team Provider: case management    Education and Discussions with Family / Patient: Attempted to update  (niece) via phone. Left voicemail. Total Time Spent on Date of Encounter in care of patient: 35 minutes This time was spent on one or more of the following: performing physical exam; counseling and coordination of care; obtaining or reviewing history; documenting in the medical record; reviewing/ordering tests, medications or procedures; communicating with other healthcare professionals and discussing with patient's family/caregivers. Current Length of Stay: 1 day(s)  Current Patient Status: Inpatient   Certification Statement: The patient will continue to require additional inpatient hospital stay due to IV antibiotics  Discharge Plan: Anticipate discharge in 48-72 hrs to discharge location to be determined pending rehab evaluations. Code Status: Level 1 - Full Code    Subjective:   Patient c/o sore throat. Unable to swallow. She has been spitting everything up for nursing. Also c/o bilateral heel pain. No vomiting this morning. Objective:     Vitals:   Temp (24hrs), Av.4 °F (36.9 °C), Min:98.4 °F (36.9 °C), Max:98.4 °F (36.9 °C)    Temp:  [98.4 °F (36.9 °C)] 98.4 °F (36.9 °C)  HR:  [] 76  Resp:  [15-18] 15  BP: (119-176)/(64-82) 129/69  SpO2:  [94 %-99 %] 97 %  There is no height or weight on file to calculate BMI. Input and Output Summary (last 24 hours): Intake/Output Summary (Last 24 hours) at 2023 1113  Last data filed at 2023 2158  Gross per 24 hour   Intake 650 ml   Output --   Net 650 ml       Physical Exam:   Physical Exam  Constitutional:       Appearance: Normal appearance. HENT:      Mouth/Throat:      Mouth: Mucous membranes are moist.   Cardiovascular:      Rate and Rhythm: Normal rate and regular rhythm. Heart sounds: No murmur heard. Pulmonary:      Effort: Pulmonary effort is normal.      Breath sounds: Normal breath sounds. Abdominal:      General: Bowel sounds are normal. There is no distension.       Palpations: Abdomen is soft. Tenderness: There is no abdominal tenderness. Musculoskeletal:         General: Tenderness (mild bilateral heel tenderness to palpation. ) present. Skin:     General: Skin is warm and dry. Neurological:      General: No focal deficit present. Mental Status: She is alert and oriented to person, place, and time. Psychiatric:         Mood and Affect: Mood normal.         Additional Data:     Labs:  Results from last 7 days   Lab Units 06/26/23  0502 06/25/23  1649   WBC Thousand/uL 15.07* 17.46*   HEMOGLOBIN g/dL 9.9* 11.3*   HEMATOCRIT % 30.9* 36.7   PLATELETS Thousands/uL 209 241   NEUTROS PCT %  --  86*   LYMPHS PCT %  --  3*   MONOS PCT %  --  10   EOS PCT %  --  0     Results from last 7 days   Lab Units 06/26/23  0434 06/25/23  1649   SODIUM mmol/L 134* 131*   POTASSIUM mmol/L 3.9 2.7*   CHLORIDE mmol/L 108 102   CO2 mmol/L 23 23   BUN mg/dL 14 15   CREATININE mg/dL 0.48* 0.48*   ANION GAP mmol/L 3 6   CALCIUM mg/dL 8.4 8.8   ALBUMIN g/dL  --  2.5*   TOTAL BILIRUBIN mg/dL  --  0.47   ALK PHOS U/L  --  130*   ALT U/L  --  24   AST U/L  --  19   GLUCOSE RANDOM mg/dL 100 117     Results from last 7 days   Lab Units 06/25/23  1649   INR  1.00             Results from last 7 days   Lab Units 06/26/23  0434 06/25/23  1649   LACTIC ACID mmol/L  --  1.8   PROCALCITONIN ng/ml 3.47* 3.48*       Lines/Drains:  Invasive Devices     Peripheral Intravenous Line  Duration           Peripheral IV 06/25/23 Distal;Right;Ventral (anterior) Forearm <1 day    Peripheral IV 06/26/23 Left Antecubital <1 day                      Imaging: Reviewed radiology reports from this admission including: chest CT scan and abdominal/pelvic CT    Recent Cultures (last 7 days):   Results from last 7 days   Lab Units 06/25/23  1649   BLOOD CULTURE  Received in Microbiology Lab. Culture in Progress. Received in Microbiology Lab. Culture in Progress.        Last 24 Hours Medication List:   Current Facility-Administered Medications   Medication Dose Route Frequency Provider Last Rate   • acetaminophen  975 mg Oral [facility] May Lamprey, DO     • cefTRIAXone  1,000 mg Intravenous Q24H May Lamprey, DO 1,000 mg (06/26/23 1036)   • cholecalciferol  2,000 Units Oral Daily May Lamprey, DO     • Diclofenac Sodium  2 g Topical 4x Daily Odilon Chavez PA-C     • folic acid  2 mg Oral Daily May Lamprey, DO     • gabapentin  200 mg Oral TID May Lamprey, DO     • heparin (porcine)  5,000 Units Subcutaneous [facility] May Lamprey, DO     • HYDROmorphone  0.2 mg Intravenous Q6H PRN Odilon Chavez PA-C     • hydroxychloroquine  100 mg Oral HS May Lamprey, DO     • hydroxychloroquine  200 mg Oral Daily With Breakfast May Lamprey, DO     • levothyroxine  75 mcg Oral Early Morning May Lamprey, DO     • melatonin  3 mg Oral HS May Lamprey, DO     • methocarbamol  500 mg Oral Daily PRN May Lamprey, DO     • [START ON 6/29/2023] methotrexate  7.5 mg Oral Weekly May Lamprey, DO     • multi-electrolyte  100 mL/hr Intravenous Continuous May Lamprey,  mL/hr (06/25/23 2204)   • multivitamin-minerals  1 tablet Oral Daily May Lamprey, DO     • nortriptyline  100 mg Oral HS May Lamprey, DO     • ondansetron  4 mg Intravenous Q6H PRN May Lamprey, DO     • oxyCODONE  2.5 mg Oral Q6H PRN May Lamprey, DO     • pantoprazole  40 mg Intravenous Q24H [address] Hillary Roldan PA-C     • polyethylene glycol  17 g Oral Daily May Lamprey, DO     • rOPINIRole  2 mg Oral HS May Lamprey, DO     • senna-docusate sodium  1 tablet Oral BID May Lamprey, DO          Today, Patient Was Seen By: Odilon Chavez PA-C    **Please Note: This note may have been constructed using a voice recognition system. **

## 2023-06-26 NOTE — ASSESSMENT & PLAN NOTE
· 3 days of nausea/vomiting per patient, CT abdomen/pelvis with concerns for right-sided pyelonephritis along with distention of upper esophagus with scattered degree in esophagus for which relation to reflux with esophageal dysfunction was not excluded  · Management of the pyelonephritis as above. Start Protonix for the potential reflux and assess response.   · Nursing dysphagia assessment, if fails will consult speech  · Otherwise continue with IV fluids, antiemetics

## 2023-06-26 NOTE — ASSESSMENT & PLAN NOTE
· Was treated for acute cystitis during recent admission, CT ab/pelvis now with findings suspicious for right pyelonephritis. Patient without complaints of right side abdominal/flank pain and with benign examination. UA is pending.   Elevated procalcitonin and WBC is noted  · Received cefepime during ED evaluation, will continue with ceftriaxone for now pending the result of blood cultures x2 along with UA/urine culture  · Trend WC, temperature curve, hemodynamics

## 2023-06-26 NOTE — ASSESSMENT & PLAN NOTE
· Potassium 2.7 on admission, replacement initiated during ED evaluation.   Possibly due to the nausea/vomiting and poor oral intake  · continue with replacement as needed

## 2023-06-26 NOTE — SPEECH THERAPY NOTE
Speech Language/Pathology  Speech-Language Pathology Bedside Swallow Evaluation      Patient Name: Pilar Mcdaniels    Today's Date: 6/26/2023     Problem List  Principal Problem:    Generalized weakness  Active Problems:    Rheumatoid arthritis involving multiple sites with positive rheumatoid factor (HCC)    Chronic pain syndrome    Hyponatremia    Dysphagia    Compression fracture of L5 vertebra with routine healing    Protein calorie malnutrition (HCC)    Possible pyelonephritis    Nausea and vomiting    Hypokalemia      Past Medical History  Past Medical History:   Diagnosis Date   • Acute blood loss anemia 9/9/2020   • Aftercare following joint replacement 9/9/2020    Patient had right reversed total shoulder arthroplasty.  Pain was controlled when he left the hospital.     • Anxiety    • Arthritis    • Cellulitis of left lower extremity 11/30/2019   • Depression    • Disease of thyroid gland     HYPO   • Dyspepsia 11/12/2019   • Dysphagia 2/6/2023   • Femur neck fracture (HCC)    • GERD (gastroesophageal reflux disease)    • Hyperthyroidism     RESOLVED: [id number]   • Hyponatremia 7/25/2022   • Leg pain 2/6/2023   • Osteoporosis    • Polio    • PVD (peripheral vascular disease) ([address])    • Right BBB/left ant fasc block    • Shoulder pain, bilateral 7/27/2021   • UTI (urinary tract infection)    • Varicella infection     LAST ASSESSED: [id number]       Past Surgical History  Past Surgical History:   Procedure Laterality Date   • APPENDECTOMY     • HIP ARTHROPLASTY Left 11/27/2017    Procedure: REMOVAL IM NAIL CONVERSION TO TOTAL HIP ARTHROPLASTY POSTERIOR APPROACH;  Surgeon: Masha Manzano MD;  Location: [facility];  Service: Orthopedics   • HIP FRACTURE SURGERY     • HIP SURGERY      both hips replaced;2013 left ,2014 right   • JOINT REPLACEMENT Right     HIP TOTAL   • MA ARTHROPLASTY GLENOHUMERAL JOINT TOTAL SHOULDER Right 9/2/2020    Procedure: ARTHROPLASTY SHOULDER REVERSE;  Surgeon: Wilfredo Jimenez MD; Location: [facility];  Service: Orthopedics   • MS ARTHROPLASTY GLENOHUMERAL JOINT TOTAL SHOULDER Left 6/1/2021    Procedure: ARTHROPLASTY SHOULDER REVERSE;  Surgeon: Michael Vargas MD;  Location: [facility];  Service: Orthopedics   • MS CONV PREV HIP TOT HIP ARTHRP W/WO AGRFT/ALGRFT Left 10/4/2017    Procedure: Irina Azul removal of left hip;  Surgeon: Rebecca Melton MD;  Location: [facility];  Service: Orthopedics   • MS [address] WRST SURG W/RLS TRANSVRS CARPL LIGM Right 5/24/2022    Procedure: RELEASE CARPAL TUNNEL ENDOSCOPIC-right;  Surgeon: Rocco Altman MD;  Location: [facility];  Service: Orthopedics   • REVISION TOTAL HIP ARTHROPLASTY Right    • TOE AMPUTATION Left 7/24/2022    Procedure: 5TH METATARSAL RESECTION WITH BOTTOM FLAP CLOSURE;  Surgeon: Pattie Mena DPM;  Location: [facility];  Service: Podiatry   • TONSILLECTOMY         Summary   Pt presented with s/s suggestive of mild-moderate oral and suspected moderate and moderate-severe pharyngeal dysphagia. Symptoms or concerns included decreased bolus propulsion and decreased mastication suspected pharyngeal swallow delay, suspected decreased hyolaryngeal elevation upon palpation and suspected pharyngeal residue. Noted CP bar/hypertrophy on previous vbs. Pt has some gagging and expectorations as well as delayed gurgling and coughing w/ all po. She denies that this occurs at home. ? able ongoing poor po intake. Risk/s for Aspiration: h/o dysphagia      Recommended Diet: NPO and Continue frequent oral care   Recommended Form of Meds: non-oral if possible   Aspiration precautions and swallowing strategies: to follow once VBS is completed  Other Recommendations: Continue frequent oral care, vbs as able         Current Medical Status  Pt is a 77 y.o. female who presented to [address] [place] [address] with complaint of weakness. She reports she has been feeling this for the last several days. Describes generalized malaise.   She has had a decreased appetite as well and reports she feels "dehydrated". She describes associated nausea and vomiting in addition to generalized abdominal pain. She reports some associated diarrhea as well. No alleviating factors. She has an extensive complex past medical history including recent hospitalization treated with Rocephin for UTI. She is a candidate for hospice however documentation reveals that she has deferred going to hospice currently. Otherwise denies any fevers, chills, chest pains, episodes syncope, confusion, unilateral weakness. Chart review also notable for chronic pain, appendectomy, and hip arthroplasty. No other symptoms currently. Current Precautions:  Fall  Aspiration      Allergies:  No known food allergies    Past medical history:  Please see H&P for details    Special Studies:  VBS 2/7/23  Modified (Video) Barium Swallow Study     Summary:  Pt presents with mild oral and moderate pharyngeal dysphagia. Dysphagia is characterized by reduced bolus transfer/lingual motion, slightly delayed pharyngeal swallow, reduced anterior hyoid excursion, reduced pharyngeal contraction and pharyngeal residual with nectar, honey, puree and solids. Pt has a prominent CP bar resulting in transient pocketing/retention above the UES with retropulsion to the pyriforms, most significant with solids. Discussed with pt about consulting with ENT about her CP bar but pt does not want any more surgeries and would prefer to manage with diet modification. No aspiration occurred today. Images are on PACS for review.         Recommendations:  Diet: dysphagia level 1 puree  Liquids: thin liquids  Meds: crushed with applesauce  Strategies: alternation of food/liquids  Frequent oral care  Upright position  F/u ST tx: brief ST f/u  Full assist with meals/feeding  Aspiration Precautions    Social/Education/Vocational Hx:  Pt lives at home, has family assist and help in the home.   Was to transition to hospice but declined    Swallow Information   Current Risks for Dysphagia & Aspiration: known history of dysphagia  Current Symptoms/Concerns: coughing with po and bringing up material  Current Diet: NPO   Baseline Diet: regular diet and thin liquids-though puree/thin was rec'd in 2/23      Baseline Assessment   Behavior/Cognition: lethargic and waxing and waning arousal level  Speech/Language Status: limited verbal output-hoarse difficult to understand  Patient Positioning: upright in bed  Pain Status/Interventions/Response to Interventions:   No report of or nonverbal indications of pain. Swallow Mechanism Exam  Facial: reduced movement  Labial: decreased strength and decreased coordination  Lingual: unable to test 2/2 limited command following  Velum: unable to visualize  Mandible:  decreased ROM  Dentition: ?natural teeth  Vocal quality:hoarse   Volitional Cough: unable to initiate volitional cough   Respiratory Status: on RA         Consistencies Assessed and Performance   Consistencies Administered: ice chips, thin liquids and puree, soft solid    Oral Stage: mild-moderate, decreased bolus acceptance, decreased bolus propulsion and decreased bolus formation  Labored mastication  Pharyngeal Stage: moderate/severe? ?  Swallow Mechanics:  Swallowing initiation appeared delayed. Laryngeal rise was palpated and judged to be reduced. Mult swallows w/ all material w/ gagging , gurgling, coughing after all trials. Esophageal Concerns: ?reduced clearing in upper esophagus    Strategies and Efficacy: -    Summary and Recommendations (see above)    Results Reviewed with: patient, RN and CRNP     Treatment Recommended: yes     Frequency of treatment: as able     Patient Stated Goal:-    Dysphagia LTG  -Patient will demonstrate safe and effective oral intake (without overt s/s significant oral/pharyngeal dysphagia including s/s penetration or aspiration) for the highest appropriate diet level.      Speech Therapy Prognosis Prognosis: fair    Prognosis Considerations: medical status, prior medical history and cognitive status

## 2023-06-26 NOTE — ASSESSMENT & PLAN NOTE
· Was treated for acute cystitis during recent admission, CT ab/pelvis now with findings suspicious for right pyelonephritis. Patient without complaints of right side abdominal/flank pain and with benign examination. UA with WBCs and bacteria.   Elevated procalcitonin and WBC is noted  · Received cefepime during ED evaluation, will continue with ceftriaxone for now pending the result of blood cultures x2 along with UA/urine culture  · Trend WC, temperature curve, hemodynamics

## 2023-06-26 NOTE — ASSESSMENT & PLAN NOTE
· 3 days of nausea/vomiting per patient, CT abdomen/pelvis with concerns for right-sided pyelonephritis along with distention of upper esophagus with scattered degree in esophagus for which relation to reflux with esophageal dysfunction was not excluded  · Management of the pyelonephritis as above. Start IV Protonix for the potential reflux and assess response.   · Nursing dysphagia assessment, if fails will consult speech  · Otherwise continue with IV fluids, antiemetics

## 2023-06-26 NOTE — ASSESSMENT & PLAN NOTE
· Again noted on CT imaging worsened from prior examination, additionally with marked compression deformity at L4.   Patient denying any increase in back pain from baseline  · We will continue to monitor exam and pain, if increases further work-up as appropriate

## 2023-06-26 NOTE — ASSESSMENT & PLAN NOTE
· Presenting with increased weakness over the past several days with generalized malaise, 3 days nausea/vomiting as well as intermittent diarrhea. Of note was recently hospitalized here also with generalized weakness and acute cystitis, appears rehab was recommended by PT/OT however patient refused this and went home with home therapies. · Possibly multifactorial in the setting of electrolyte abnormalities, possible pyelonephritis, and a nausea/vomiting with possible reflux with esophageal dysfunction as below.   Management of these individual problems as per associated plans  · PT/OT evaluation will be requested

## 2023-06-26 NOTE — ACP (ADVANCE CARE PLANNING)
Advanced Care Planning Progress Note    Serious Illness Conversation    1. What is your understanding now of where you are with your illness? Prognostic Understanding: overestimates prognosis     2. How much information about what is likely to be ahead with your illness would you like to have? Information: patient wants to be fully informed     3. What did you (clinician) communicate to the patient? Prognostic Communication: Uncertain - It can be difficult to predict what will happen with your illness. I hope you will continue to live well for a long time but I’m worried that you could get sick quickly, and I think it is important to prepare for that possibility. 4. If your health situation worsens, what are your most important goals? Goals: be at home     5. What are the biggest fears and worries about the future and your health? 6. What abilities are so critical to your life that you cannot imagine living without them? 7. What gives you strength as you think about the future with your illness? 8. If you become sicker, how much are you willing to go through for the possibility of gaining more time? Be in the hospital: Yes    Be in the ICU: Yes    Be on a ventilator: Yes    9. How much does your proxy and family know about your priorities and wishes? Discussion Discussion: extensive discussion with family about goals and wishes        How does this plan sound to you? I will do everything I can to help you through this. Patient wants to finish this discussion at a later time    Held discussion with patient given her ongoing weakness/apparent ongoing decline, particularly as appears she has been referred to hospice in the outpatient setting.   States to me at this juncture she does not desire hospice at this time, would want hospitalizations and is okay with full cares at this juncture, specifically stating she would be okay with full resuscitative measures/mechanical ventilation if she were to have marked decline. Advised her that given medical situation that there is no guarantee she would even return to her current status if she were to survive. Patient expressing understanding of this however stating she would still want to go through with this, but is agreeable to family involvement. At this point patient stating she would simply like to focus on medical cares at this time, thus will continue with medical treatment plan as per my H&P, ACP can be readdressed dependent on hospital course or at time of discharge.      I have spent 30 minutes speaking with my patient on advanced care planning today or during this visit     Advanced directives         Junior Montero DO

## 2023-06-26 NOTE — ASSESSMENT & PLAN NOTE
· Sodium 131 on admission, possibly due to poor recent oral intake and nausea/vomiting  · Continue with IV fluids, recheck BMP

## 2023-06-27 ENCOUNTER — APPOINTMENT (INPATIENT)
Dept: RADIOLOGY | Facility: HOSPITAL | Age: 78
DRG: 689 | End: 2023-06-27
Payer: MEDICARE

## 2023-06-27 ENCOUNTER — HOME CARE VISIT (OUTPATIENT)
Dept: HOME HEALTH SERVICES | Facility: HOME HEALTHCARE | Age: 78
End: 2023-06-27
Payer: MEDICARE

## 2023-06-27 PROBLEM — R78.81 BACTEREMIA DUE TO ESCHERICHIA COLI: Status: ACTIVE | Noted: 2023-06-27

## 2023-06-27 PROBLEM — B96.20 BACTEREMIA DUE TO ESCHERICHIA COLI: Status: ACTIVE | Noted: 2023-06-27

## 2023-06-27 LAB
ALBUMIN SERPL BCP-MCNC: 2.2 G/DL (ref 3.5–5)
ALP SERPL-CCNC: 152 U/L (ref 46–116)
ALT SERPL W P-5'-P-CCNC: 23 U/L (ref 12–78)
ANION GAP SERPL CALCULATED.3IONS-SCNC: 10 MMOL/L
AST SERPL W P-5'-P-CCNC: 19 U/L (ref 5–45)
BACTERIA UR CULT: NORMAL
BILIRUB SERPL-MCNC: 0.29 MG/DL (ref 0.2–1)
BUN SERPL-MCNC: 9 MG/DL (ref 5–25)
CALCIUM ALBUM COR SERPL-MCNC: 10.2 MG/DL (ref 8.3–10.1)
CALCIUM SERPL-MCNC: 8.8 MG/DL (ref 8.3–10.1)
CHLORIDE SERPL-SCNC: 103 MMOL/L (ref 96–108)
CO2 SERPL-SCNC: 22 MMOL/L (ref 21–32)
CREAT SERPL-MCNC: 0.37 MG/DL (ref 0.6–1.3)
ERYTHROCYTE [DISTWIDTH] IN BLOOD BY AUTOMATED COUNT: 16.1 % (ref 11.6–15.1)
GFR SERPL CREATININE-BSD FRML MDRD: 101 ML/MIN/1.73SQ M
GLUCOSE SERPL-MCNC: 59 MG/DL (ref 65–140)
HCT VFR BLD AUTO: 39 % (ref 34.8–46.1)
HGB BLD-MCNC: 11.8 G/DL (ref 11.5–15.4)
MCH RBC QN AUTO: 26.6 PG (ref 26.8–34.3)
MCHC RBC AUTO-ENTMCNC: 30.3 G/DL (ref 31.4–37.4)
MCV RBC AUTO: 88 FL (ref 82–98)
PLATELET # BLD AUTO: 252 THOUSANDS/UL (ref 149–390)
PMV BLD AUTO: 9.4 FL (ref 8.9–12.7)
POTASSIUM SERPL-SCNC: 3.5 MMOL/L (ref 3.5–5.3)
PROT SERPL-MCNC: 6.8 G/DL (ref 6.4–8.4)
RBC # BLD AUTO: 4.44 MILLION/UL (ref 3.81–5.12)
SODIUM SERPL-SCNC: 135 MMOL/L (ref 135–147)
WBC # BLD AUTO: 13.43 THOUSAND/UL (ref 4.31–10.16)

## 2023-06-27 PROCEDURE — 80053 COMPREHEN METABOLIC PANEL: CPT | Performed by: PHYSICIAN ASSISTANT

## 2023-06-27 PROCEDURE — 74230 X-RAY XM SWLNG FUNCJ C+: CPT

## 2023-06-27 PROCEDURE — 85027 COMPLETE CBC AUTOMATED: CPT | Performed by: PHYSICIAN ASSISTANT

## 2023-06-27 PROCEDURE — C9113 INJ PANTOPRAZOLE SODIUM, VIA: HCPCS | Performed by: PHYSICIAN ASSISTANT

## 2023-06-27 PROCEDURE — 99232 SBSQ HOSP IP/OBS MODERATE 35: CPT | Performed by: PHYSICIAN ASSISTANT

## 2023-06-27 PROCEDURE — 92611 MOTION FLUOROSCOPY/SWALLOW: CPT

## 2023-06-27 PROCEDURE — 99222 1ST HOSP IP/OBS MODERATE 55: CPT | Performed by: INTERNAL MEDICINE

## 2023-06-27 RX ORDER — ACETAMINOPHEN 650 MG/1
650 SUPPOSITORY RECTAL EVERY 4 HOURS PRN
Status: DISCONTINUED | OUTPATIENT
Start: 2023-06-27 | End: 2023-06-27

## 2023-06-27 RX ORDER — ACETAMINOPHEN 650 MG/1
650 SUPPOSITORY RECTAL EVERY 4 HOURS PRN
Status: DISCONTINUED | OUTPATIENT
Start: 2023-06-27 | End: 2023-06-30

## 2023-06-27 RX ORDER — KETOROLAC TROMETHAMINE 30 MG/ML
15 INJECTION, SOLUTION INTRAMUSCULAR; INTRAVENOUS ONCE
Status: COMPLETED | OUTPATIENT
Start: 2023-06-27 | End: 2023-06-27

## 2023-06-27 RX ORDER — HYDROMORPHONE HCL IN WATER/PF 6 MG/30 ML
0.2 PATIENT CONTROLLED ANALGESIA SYRINGE INTRAVENOUS EVERY 4 HOURS PRN
Status: DISCONTINUED | OUTPATIENT
Start: 2023-06-27 | End: 2023-07-04

## 2023-06-27 RX ADMIN — DICLOFENAC SODIUM 2 G: 10 GEL TOPICAL at 07:52

## 2023-06-27 RX ADMIN — HYDROMORPHONE HYDROCHLORIDE 0.2 MG: 0.2 INJECTION, SOLUTION INTRAMUSCULAR; INTRAVENOUS; SUBCUTANEOUS at 16:23

## 2023-06-27 RX ADMIN — DICLOFENAC SODIUM 2 G: 10 GEL TOPICAL at 16:22

## 2023-06-27 RX ADMIN — SODIUM CHLORIDE, SODIUM GLUCONATE, SODIUM ACETATE, POTASSIUM CHLORIDE, MAGNESIUM CHLORIDE, SODIUM PHOSPHATE, DIBASIC, AND POTASSIUM PHOSPHATE 100 ML/HR: .53; .5; .37; .037; .03; .012; .00082 INJECTION, SOLUTION INTRAVENOUS at 16:23

## 2023-06-27 RX ADMIN — PANTOPRAZOLE SODIUM 40 MG: 40 INJECTION, POWDER, FOR SOLUTION INTRAVENOUS at 07:46

## 2023-06-27 RX ADMIN — KETOROLAC TROMETHAMINE 15 MG: 30 INJECTION, SOLUTION INTRAMUSCULAR; INTRAVENOUS at 20:29

## 2023-06-27 RX ADMIN — HEPARIN SODIUM 5000 UNITS: 5000 INJECTION INTRAVENOUS; SUBCUTANEOUS at 22:29

## 2023-06-27 RX ADMIN — HYDROMORPHONE HYDROCHLORIDE 0.2 MG: 0.2 INJECTION, SOLUTION INTRAMUSCULAR; INTRAVENOUS; SUBCUTANEOUS at 22:29

## 2023-06-27 RX ADMIN — HEPARIN SODIUM 5000 UNITS: 5000 INJECTION INTRAVENOUS; SUBCUTANEOUS at 05:30

## 2023-06-27 RX ADMIN — HEPARIN SODIUM 5000 UNITS: 5000 INJECTION INTRAVENOUS; SUBCUTANEOUS at 13:03

## 2023-06-27 RX ADMIN — CEFTRIAXONE 2000 MG: 10 INJECTION, POWDER, FOR SOLUTION INTRAVENOUS at 07:45

## 2023-06-27 RX ADMIN — HYDROMORPHONE HYDROCHLORIDE 0.2 MG: 0.2 INJECTION, SOLUTION INTRAMUSCULAR; INTRAVENOUS; SUBCUTANEOUS at 09:34

## 2023-06-27 RX ADMIN — SODIUM CHLORIDE, SODIUM GLUCONATE, SODIUM ACETATE, POTASSIUM CHLORIDE, MAGNESIUM CHLORIDE, SODIUM PHOSPHATE, DIBASIC, AND POTASSIUM PHOSPHATE 100 ML/HR: .53; .5; .37; .037; .03; .012; .00082 INJECTION, SOLUTION INTRAVENOUS at 07:46

## 2023-06-27 NOTE — CONSULTS
Nutrition Recommendations:    Pt meets criteria for malnutrition - see separate note    Consider checking Mg and phosphorus levels and replete prn. Will continue to follow for nutrition plan - if enteral nutrition is pursued please reach out to RD via [facility] Text for recs.

## 2023-06-27 NOTE — ASSESSMENT & PLAN NOTE
· 3 days of nausea/vomiting per patient, CT abdomen/pelvis with concerns for right-sided pyelonephritis along with distention of upper esophagus with scattered degree in esophagus for which relation to reflux with esophageal dysfunction was not excluded  · Management of the pyelonephritis as above. Start IV Protonix for the potential reflux and assess response.   · Appears to have resolved

## 2023-06-27 NOTE — CASE MANAGEMENT
Case Management Discharge Planning Note    Patient name Juanito Colindres  Location [facility] 926/[facility] 926-01 MRN [id number]  : 1945 Date 2023       Current Admission Date: 2023  Current Admission Diagnosis:Generalized weakness   Patient Active Problem List    Diagnosis Date Noted   • Bacteremia due to Escherichia coli 2023   • Pyelonephritis 2023   • Nausea and vomiting 2023   • Hypokalemia 2023   • Hypomagnesemia 2023   • Generalized weakness 2023   • Acute cystitis with hematuria 2023   • Urinary retention 2023   • Immunodeficiency due to drugs ([address]) 2023   • Chronic venous hypertension (idiopathic) with ulcer of left lower extremity (CODE) ([address]) 2023   • Diabetic ulcer of left midfoot associated with type 2 diabetes mellitus, with bone involvement without evidence of necrosis ([address]) 2023   • Other diseases of thymus ([address]) 2023   • Protein calorie malnutrition ([address]) 2023   • Weight loss 2023   • Acute encephalopathy 2023   • Polypharmacy 2023   • Hypercalcemia 2023   • Compression fracture of L5 vertebra with routine healing 2023   • Acute low back pain 2023   • Effusion of right shoulder joint 2023   • Arm bruise, right, initial encounter 2023   • Concern for cerebral contusion 2023   • Dysphagia 2023   • 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   • Non-healing open wound of heel, initial encounter 10/12/2022   • Anemia 2022   • Pressure injury of left foot, stage 4 ([address])    • Hyponatremia 2022   • Venous insufficiency 2021   • Encounter for annual wellness exam in Medicare patient 2021   • S/P reverse total shoulder arthroplasty, left 2021   • Rupture long head biceps tendon, left, initial encounter 2021   • Shoulder dislocation 2021   • Depression 2020   • Other forms of systemic lupus erythematosus ([address] ) 08/31/2020   • Vitamin D deficiency 07/24/2019   • Chronic pain syndrome 04/18/2019   • Rheumatoid arthritis involving multiple sites with positive rheumatoid factor ([address]) 03/04/2019   • Ambulatory dysfunction 11/13/2018   • Closed compression fracture of L3 lumbar vertebra 11/13/2018   • S/P total hip arthroplasty 11/27/2017   • Anxiety 08/30/2017   • Urinary tract infection without hematuria 08/30/2017   • RLS (restless legs syndrome) 08/30/2017   • RBBB 08/23/2017   • Age-related osteoporosis with current pathological fracture with routine healing 04/18/2017   • Hypothyroidism due to Hashimoto's thyroiditis 12/13/2016      LOS (days): 2  Geometric Mean LOS (GMLOS) (days): 2.80  Days to GMLOS:1     OBJECTIVE:  Risk of Unplanned Readmission Score: 30.39         Current admission status: Inpatient   Preferred Pharmacy:   [address] [place] [address]  Phone: [phone] Fax: [address], [address] [place] [address]  Phone: [phone] Fax: [phone]    Primary Care Provider: Andrew Mendosa MD    Primary Insurance: MEDICARE  Secondary Insurance:     DISCHARGE DETAILS:       Additional Comments: Per chart review, pt to remain inpatient at this time. CM to follow.

## 2023-06-27 NOTE — ASSESSMENT & PLAN NOTE
· Speech eval appreciated  · VBS today shows no swallowing function  · D/W GI and they will eval  · Strict NPO for now  · Discussed feeding tube with patient and niece who is POA. They would like to discuss as a family further.

## 2023-06-27 NOTE — ASSESSMENT & PLAN NOTE
· 1 out of 2 blood cultures positive for E.  Coli  · Suspect in setting of acute pyelonephritis  · Follow up with sensitivities

## 2023-06-27 NOTE — PLAN OF CARE
Problem: Potential for Falls  Goal: Patient will remain free of falls  Description: INTERVENTIONS:  - Educate patient/family on patient safety including physical limitations  - Instruct patient to call for assistance with activity   - Consult OT/PT to assist with strengthening/mobility   - Keep Call bell within reach  - Keep bed low and locked with side rails adjusted as appropriate  - Keep care items and personal belongings within reach  - Initiate and maintain comfort rounds  - Make Fall Risk Sign visible to staff  - Offer Toileting every 2 Hours, in advance of need  - Initiate/Maintain 2alarm  - Obtain necessary fall risk management equipment: 2  - Apply yellow socks and bracelet for high fall risk patients  - Consider moving patient to room near nurses station  Outcome: Progressing     Problem: MOBILITY - ADULT  Goal: Maintain or return to baseline ADL function  Description: INTERVENTIONS:  -  Assess patient's ability to carry out ADLs; assess patient's baseline for ADL function and identify physical deficits which impact ability to perform ADLs (bathing, care of mouth/teeth, toileting, grooming, dressing, etc.)  - Assess/evaluate cause of self-care deficits   - Assess range of motion  - Assess patient's mobility; develop plan if impaired  - Assess patient's need for assistive devices and provide as appropriate  - Encourage maximum independence but intervene and supervise when necessary  - Involve family in performance of ADLs  - Assess for home care needs following discharge   - Consider OT consult to assist with ADL evaluation and planning for discharge  - Provide patient education as appropriate  Outcome: Progressing  Goal: Maintains/Returns to pre admission functional level  Description: INTERVENTIONS:  - Perform BMAT or MOVE assessment daily.   - Set and communicate daily mobility goal to care team and patient/family/caregiver.    - Collaborate with rehabilitation services on mobility goals if consulted  - Perform Range of Motion 2 times a day. - Reposition patient every 2 hours.   - Dangle patient 2 times a day  - Stand patient 2 times a day  - Ambulate patient 2 times a day  - Out of bed to chair 2 times a day   - Out of bed for meals 2 times a day  - Out of bed for toileting  - Record patient progress and toleration of activity level   Outcome: Progressing     Problem: Prexisting or High Potential for Compromised Skin Integrity  Goal: Skin integrity is maintained or improved  Description: INTERVENTIONS:  - Identify patients at risk for skin breakdown  - Assess and monitor skin integrity  - Assess and monitor nutrition and hydration status  - Monitor labs   - Assess for incontinence   - Turn and reposition patient  - Assist with mobility/ambulation  - Relieve pressure over bony prominences  - Avoid friction and shearing  - Provide appropriate hygiene as needed including keeping skin clean and dry  - Evaluate need for skin moisturizer/barrier cream  - Collaborate with interdisciplinary team   - Patient/family teaching  - Consider wound care consult   Outcome: Progressing

## 2023-06-27 NOTE — ASSESSMENT & PLAN NOTE
Malnutrition Findings:                                 BMI Findings: Body mass index is 22.03 kg/m².

## 2023-06-27 NOTE — CONSULTS
Consult Service: Gastroenterology      PATIENT INFORMATION      Juliana Rivera 77 y.o. female MRN: [id number]  Unit/Bed#: [facility] 926-01 Encounter: [id number]  PCP: Juan Adams MD  Date of Admission:  6/25/2023  Date of Consultation: 06/27/23  Requesting Physician: Danyelle Garcia, DO       ASSESSMENTS & PLAN     77years old female with history of hypothyroidism, rheumatoid arthritis on Plaquenil and methotrexate presented with E. coli bacteremia and right pyelonephritis currently on IV antibiotic. Vital stable. Noted electrolyte abnormalities which has resolved. Complaining of solid and liquid food dysphagia for 2 days for which GI was consulted. 1.  Dysphagia  -Started 2 to 3 days prior to presentation never had complaint of dysphagia before  - No prior EGD  - Bringing food back up within seconds after swallowing sometimes minutes  - Imaging of chest showing distention of upper esophagus which was new finding compared to prior imaging.  - No prior history of ulcers, strictures. No weight loss. - Speech therapy following s/p VPS which showed significant oropharyngeal dysphagia as well. Plan:-  - We will discuss with attending regarding plan for EGD tomorrow  - Continue n.p.o.  - Discussed with niece on phone regarding plan for EGD and she agrees with the plan as well as patient agrees with the plan. Thank you for the consultation. We will continue to follow. REASON FOR CONSULTATION      Dysphagia      HISTORY OF PRESENT ILLNESS      Cornelius Radford is a 77 y.o. female with past medical history of hypothyroidism, rheumatoid arthritis on Plaquenil and methotrexate, chronic L5 vertebral fracture, current opioid user who was recently admitted and treated for cystitis and discharged home. Presented again with worsening generalized weakness, nausea vomiting, intermittent diarrhea. Found to have right-sided pyelonephritis which was treated with IV antibiotic.   Also noted to have bacteremia with E. coli likely from pyelonephritis. Currently has been afebrile since 48 hours. Vital stable. Also noted to have electrolyte abnormalities likely causing generalized weakness which is been resolved and generalized weakness is also improved. Complaining of dysphagia starting 2 to 3 days prior to presentation along with nausea vomiting. Difficulty eating solid and liquid food. Also difficulty swallowing oral secretions. No further episodes of diarrhea reported. Denies any abdominal pain. Some bloating, dyspepsia. No further flank pain which was present on admission. Leukocytosis has been improving. Urine culture showing mixed contaminants. Blood culture showing E. coli. States dysphagia is getting food stuck in her throat has been vomiting within swallowing food times seconds. Complaining of food getting stuck in her chest as well. Speech therapy was consulted due to VBS which showed oropharyngeal dysphagia and significant amount and patient was kept strict n.p.o. No prior history of EGD. REVIEW OF SYSTEMS     A thorough 12-point review of systems has been conducted. Pertinent positives and negatives are mentioned in the history of present illness. PAST MEDICAL & SURGICAL HISTORY      Past Medical History:   Diagnosis Date   • Acute blood loss anemia 9/9/2020   • Aftercare following joint replacement 9/9/2020    Patient had right reversed total shoulder arthroplasty.  Pain was controlled when he left the hospital.     • Anxiety    • Arthritis    • Cellulitis of left lower extremity 11/30/2019   • Depression    • Disease of thyroid gland     HYPO   • Dyspepsia 11/12/2019   • Dysphagia 2/6/2023   • Femur neck fracture ([facility])    • GERD (gastroesophageal reflux disease)    • Hyperthyroidism     RESOLVED: [id number]   • Hyponatremia 7/25/2022   • Leg pain 2/6/2023   • Osteoporosis    • Polio    • PVD (peripheral vascular disease) ([facility])    • Right BBB/left ant fasc block    • Shoulder pain, bilateral 7/27/2021   • UTI (urinary tract infection)    • Varicella infection     LAST ASSESSED: [id number]       Past Surgical History:   Procedure Laterality Date   • APPENDECTOMY     • HIP ARTHROPLASTY Left 11/27/2017    Procedure: REMOVAL IM NAIL CONVERSION TO TOTAL HIP ARTHROPLASTY POSTERIOR APPROACH;  Surgeon: Bert Gilliam MD;  [facility]: [facility];  Service: Orthopedics   • HIP FRACTURE SURGERY     • HIP SURGERY      both hips replaced;2013 left ,2014 right   • JOINT REPLACEMENT Right     HIP TOTAL   • OH ARTHROPLASTY GLENOHUMERAL JOINT TOTAL SHOULDER Right 9/2/2020    Procedure: ARTHROPLASTY SHOULDER REVERSE;  Surgeon: Hillary Locke MD;  [facility];  Service: Orthopedics   • OH ARTHROPLASTY GLENOHUMERAL JOINT TOTAL SHOULDER Left 6/1/2021    Procedure: ARTHROPLASTY SHOULDER REVERSE;  Surgeon: Hillary Locke MD;  [facility];  Service: Orthopedics   • OH CONV PREV HIP TOT HIP ARTHRP W/WO AGRFT/ALGRFT Left 10/4/2017    Procedure: Hareware removal of left hip;  Surgeon: Bert Gilliam MD;  [facility];  Service: Orthopedics   • [place] [id number] WRST SURG W/RLS TRANSVRS CARPL LIGM Right 5/24/2022    Procedure: RELEASE CARPAL TUNNEL ENDOSCOPIC-right;  Surgeon: Leyda Tsai MD;  [facility];  Service: Orthopedics   • REVISION TOTAL HIP ARTHROPLASTY Right    • TOE AMPUTATION Left 7/24/2022    Procedure: 5TH METATARSAL RESECTION WITH BOTTOM FLAP CLOSURE;  Surgeon: Gloria Kearney DPM;  Location: [facility];  Service: Podiatry   • TONSILLECTOMY           MEDICATIONS & ALLERGIES       Medications:   Prior to Admission medications    Medication Sig Start Date End Date Taking?  Authorizing Provider   methotrexate 2.5 MG tablet 3 tablets by mouth once a week on Thursday 9/1/20  Yes Katherine Bah PA-C   acetaminophen (TYLENOL) 650 mg CR tablet Take 2 tablets (1,300 mg total) by mouth 3 (three) times a day 4/17/23   Francois Westbrook PA-C   Cholecalciferol 50 MCG (2000 UT) TBDP Take 1 tablet (2,000 Units total) by mouth daily 5/17/23   Dao Hartman MD   folic acid (FOLVITE) 1 mg tablet Take 2 mg by mouth daily 2/24/22   Historical Provider, MD   gabapentin (NEURONTIN) 100 mg capsule Take 2 capsules (200 mg total) by mouth 3 (three) times a day 6/6/23   Dao Hartman MD   hydroxychloroquine (PLAQUENIL) 200 mg tablet Take 1 tablet (200 mg total) by mouth every morning AND 0.5 tablets (100 mg total) daily at bedtime.  1 tab in am 1/2 tab in pm. 5/19/23 8/17/23  Dao Hartman MD   levothyroxine 75 mcg tablet Take 1 tablet (75 mcg total) by mouth daily in the early morning 5/17/23 8/15/23  Dao Hartman MD   lidocaine (LMX) 4 % cream Apply topically as needed for mild pain 4/17/23   Sandy Hillman PA-C   melatonin 3 mg Take 1 tablet (3 mg total) by mouth daily at bedtime 5/4/23   Kamila Guzmán MD   methocarbamol (ROBAXIN) 500 mg tablet Take 1 tablet (500 mg total) by mouth daily as needed for muscle spasms 5/17/23   Dao Hartman MD   Multiple Vitamins-Minerals (CENTRUM SILVER PO) Take 1 tablet by mouth daily 12/13/16   Historical Provider, MD   nortriptyline (PAMELOR) 50 mg capsule Take 2 capsules (100 mg total) by mouth daily at bedtime 5/17/23 6/16/23  Dao Hartman MD   oxyCODONE (ROXICODONE) 5 immediate release tablet Take 0.5 tablets (2.5 mg total) by mouth every 6 (six) hours as needed for severe pain Max Daily Amount: 10 mg 6/3/23   CONNIE Richardson   polyethylene glycol (MIRALAX) 17 g packet Take 17 g by mouth daily Do not start before April 18, 2023. 4/18/23   Sandy Hillman PA-C   rOPINIRole (REQUIP) 2 mg tablet Take 1 tablet (2 mg total) by mouth daily at bedtime 6/6/23   Dao Hartman MD   senna-docusate sodium (SENOKOT S) 8.6-50 mg per tablet Take 1 tablet by mouth 2 (two) times a day 8/3/22   Pooja Stanton DPM   Calcium Ascorbate (VITAMIN C) 500 mg tablet Take 500 mg by mouth daily  5/6/23  Historical Provider, MD       Allergies: No Known Allergies      SOCIAL HISTORY      Marital Status:     Substance Use History:   Social History     Substance and Sexual Activity   Alcohol Use Not Currently     Social History     Tobacco Use   Smoking Status Former   Smokeless Tobacco Never   Tobacco Comments    quit 20-30 years ago     Social History     Substance and Sexual Activity   Drug Use Not Currently         FAMILY HISTORY      Non-Contributory      PHYSICAL EXAM     Vitals:   Blood Pressure: 128/68 (06/27/23 1435)  Pulse: 86 (06/27/23 1435)  Temperature: 98.9 °F (37.2 °C) (06/27/23 1435)  Temp Source: Oral (06/27/23 1435)  Respirations: 18 (06/27/23 1435)  Height: 4' 9.01" (144.8 cm) (06/27/23 0600)  Weight - Scale: 46.2 kg (101 lb 13.6 oz) (06/27/23 0600)  SpO2: 99 % (06/27/23 1435)    Physical Exam:   GENERAL: NAD  HEENT:  NC/AT, no scleral icterus  CARDIAC:  RRR, +S1/S2  PULMONARY:  CTA B/L, no wheezing/rales/rhonci, non-labored breathing  ABDOMEN:  Soft, NT/ND, +BS, no rebound/guarding/rigidity  Extremities:  No edema, cyanosis, or clubbing  NEUROLOGIC:  Alert  SKIN:  No rashes or erythema      ADDITIONAL DATA     Lab Results:     Results from last 7 days   Lab Units 06/27/23  0628 06/26/23  0502 06/25/23  1649   WBC Thousand/uL 13.43*   < > 17.46*   HEMOGLOBIN g/dL 11.8   < > 11.3*   HEMATOCRIT % 39.0   < > 36.7   PLATELETS Thousands/uL 252   < > 241   NEUTROS PCT %  --   --  86*   LYMPHS PCT %  --   --  3*   MONOS PCT %  --   --  10   EOS PCT %  --   --  0    < > = values in this interval not displayed.      Results from last 7 days   Lab Units 06/27/23  0628   POTASSIUM mmol/L 3.5   CHLORIDE mmol/L 103   CO2 mmol/L 22   BUN mg/dL 9   CREATININE mg/dL 0.37*   CALCIUM mg/dL 8.8   ALK PHOS U/L 152*   ALT U/L 23   AST U/L 19     Results from last 7 days   Lab Units 06/25/23  1649   INR  1.00       Imaging:    FL barium swallow video w speech    Result Date: 6/27/2023  Narrative: A video barium swallow study was performed by the Department of Speech Pathology. Please refer to the report for the official interpretation. The images are stored for archival purposes only. Study images were not formally reviewed by the Radiology Department. XR chest 1 view portable    Result Date: 6/26/2023  Narrative: CHEST INDICATION:   n/v. COMPARISON: CXR 6/1/2023 and 2/6/2023, chest CT 6/25/2023. EXAM PERFORMED/VIEWS:  XR CHEST PORTABLE. FINDINGS: Cardiomediastinal silhouette normal. Lungs clear. No effusion or pneumothorax. Upper abdomen normal. Bones normal for age. Bilateral reverse shoulder arthroplasty with chronic dislocation. Impression: No acute cardiopulmonary disease. 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 Workstation performed: [id number]     CT chest abdomen pelvis w contrast    Result Date: 6/25/2023  Narrative: CT CHEST, ABDOMEN AND PELVIS WITH IV CONTRAST INDICATION:   Abdominal pain, acute, nonlocalized n/v, abd pain, cachetic. COMPARISON: CT abdomen pelvis 4/13/2023, MRI lumbar spine 4/15/2023, CT chest 10/22/2022. TECHNIQUE: CT examination of the chest, abdomen and pelvis was performed. Multiplanar 2D reformatted images were created from the source data. This examination, like all CT scans performed in the [facility], was performed utilizing techniques to minimize radiation dose exposure, including the use of iterative reconstruction and automated exposure control. Radiation dose length product (DLP) for this visit:  362.5 mGy-cm IV Contrast:  70 mL of iohexol (OMNIPAQUE) Enteric Contrast: Enteric contrast was not administered. FINDINGS: CHEST LUNGS: Mild biapical pleural-parenchymal changes. Scattered atelectatic changes in the bilateral lower lung fields. Generalized volume loss. No focal infiltrate. Airways are patent PLEURA:  Unremarkable. HEART/GREAT VESSELS: Heart is unremarkable for patient's age.   No thoracic aortic aneurysm. MEDIASTINUM AND TOMASA: New patulous distention of the upper esophagus. Scattered debris in the esophagus, question related to reflux with esophageal dysfunction not excluded. No significant intrathoracic lymphadenopathy. CHEST WALL AND LOWER NECK: Heterogeneous soft tissue fullness at the left upper chest wall adjacent to the left anterior first and second ribs does appear similar to the prior noncontrast exam, question posttraumatic. ABDOMEN LIVER/BILIARY TREE:  Unremarkable. GALLBLADDER:  No calcified gallstones. No pericholecystic inflammatory change. SPLEEN:  Unremarkable. PANCREAS:  Unremarkable. ADRENAL GLANDS:  Unremarkable. KIDNEYS/URETERS: No hydronephrosis. Large nonobstructing calculus at the right renal pelvis measuring up to 2 cm in size, larger previously 1.5 cm. A few system hypodensities, too small to characterize. Right kidney demonstrates a patchy heterogeneous appearance extending to the periphery. In addition there is new mild right perinephric fluid and perinephric fat stranding suspicious for pyelonephritis. STOMACH AND BOWEL: Small to moderate size hiatal hernia. No acute findings. APPENDIX:  No findings to suggest appendicitis. ABDOMINOPELVIC CAVITY:  No ascites. No pneumoperitoneum. No lymphadenopathy. VESSELS:  Atherosclerotic changes are present. No evidence of aneurysm. PELVIS Limited assessment due to hip replacement hardware. REPRODUCTIVE ORGANS: Not definitely seen. Correlate for prior hysterectomy. URINARY BLADDER: Not well visualized. ABDOMINAL WALL/INGUINAL REGIONS: Postsurgical changes at the intra-abdominal wall. OSSEOUS STRUCTURES: Severe compression deformity at L3 is stable. Moderate compression deformity at L4 is new. Severe compression deformity at L5 is worse from the prior exam. Bilateral shoulder prostheses identified with chronic bilateral anterior shoulder dislocation. Status post bilateral hip replacement. Impression: 1.   No acute pulmonary findings. 2.  New patulous distention of the upper esophagus. Scattered debris in the esophagus, question related to reflux with esophageal dysfunction not excluded. 3.  New findings suspicious for right-sided pyelonephritis. 4. Moderate compression deformity at L4 is new. Severe compression deformity at L5 is worse from the prior exam previously mild. 5.  Heterogeneous soft tissue fullness at the left upper chest wall adjacent to the left anterior first and second ribs. The overall fullness does appear similar to the prior noncontrast exam, question posttraumatic and related to old hematoma but given  the heterogeneity recommend follow-up contrast chest CT in 3 months. 6.  Bilateral shoulder prostheses identified with chronic bilateral anterior shoulder dislocation. The study was marked in [facility] for immediate notification. Workstation performed: [id number]     XR chest portable    Result Date: 6/2/2023  Narrative: CHEST INDICATION:   cough, tachypnea. Weakness COMPARISON: 02/06/2023 EXAM PERFORMED/VIEWS:  XR CHEST PORTABLE 1 image FINDINGS: Cardiomediastinal silhouette appears enlarged. Chronic changes are present. Right upper lobe scarring. Pulmonary vessels are normal. No pneumothorax or pleural effusion. Multiple rib deformities. Bilateral reverse total shoulder arthroplasties. Bilateral anterior shoulder dislocations, new on the left compared to 02/06/2023. Chronic AC joint separation on the right. Impression: No acute cardiopulmonary disease. Workstation performed: [id number]       EKG, Pathology, and Other Studies Reviewed on Admission:   · EKG: Reviewed      Counseling / Coordination of Care Time: 30 total mins spent n consult. Greater than 50% of total time spent on patient counseling and coordination of care.         ...............................................................................................................................................  ** Please Note: This note is constructed using a voice recognition dictation system.  **

## 2023-06-27 NOTE — RESTORATIVE TECHNICIAN NOTE
Restorative Technician Note      Patient Name: Jeni Herrera     Restorative Tech Visit Date: 06/27/23  Note Type: Mobility  Patient Position Upon Consult: Supine  Activity Performed: Transferred  Assistive Device: Other (Comment) (smooth )  Patient Position at End of Consult: Supine; All needs within reach;  Other (comment) (transferred onto transport stretcher; left in the care of transport team)      [facility]

## 2023-06-27 NOTE — ASSESSMENT & PLAN NOTE
· Home dose Plaquenil 200 mg in a.m. and 100 mg in PM, methotrexate 7.5 mg every Thursday  · On hold secondary to dysphagia

## 2023-06-27 NOTE — MALNUTRITION/BMI
This medical record reflects one or more clinical indicators suggestive of malnutrition. Malnutrition Findings:   Adult Malnutrition type:  (suspect acute and chronic components)  Adult Degree of Malnutrition: Malnutrition of moderate degree  Malnutrition Characteristics: Fat loss, Muscle loss, Inadequate energy                  360 Statement: Moderate malnutrition r/t suspect acute and chronic medical conditions contributing as evidenced by PO intake <50% of estimated needs for 5 days, mild muscle loss around clavicles, deltoids, temples; mild buccal fat pad loss. Treatment to be determined pending further workup, family discussion, etc.    BMI Findings: Body mass index is 22.03 kg/m². See Nutrition note dated 6/27/23 for additional details. Completed nutrition assessment is viewable in the nutrition documentation.

## 2023-06-27 NOTE — ASSESSMENT & PLAN NOTE
· Sodium 131 on admission, possibly due to poor recent oral intake and nausea/vomiting  · Improved with IVF

## 2023-06-27 NOTE — PLAN OF CARE
Problem: Potential for Falls  Goal: Patient will remain free of falls  Description: INTERVENTIONS:  - Educate patient/family on patient safety including physical limitations  - Instruct patient to call for assistance with activity   - Consult OT/PT to assist with strengthening/mobility   - Keep Call bell within reach  - Keep bed low and locked with side rails adjusted as appropriate  - Keep care items and personal belongings within reach  - Initiate and maintain comfort rounds  - Make Fall Risk Sign visible to staff  - Offer Toileting every 3 Hours, in advance of need  - Initiate/Maintain 3alarm  - Obtain necessary fall risk management equipment: 3  - Apply yellow socks and bracelet for high fall risk patients  - Consider moving patient to room near nurses station  Outcome: Progressing     Problem: MOBILITY - ADULT  Goal: Maintain or return to baseline ADL function  Description: INTERVENTIONS:  -  Assess patient's ability to carry out ADLs; assess patient's baseline for ADL function and identify physical deficits which impact ability to perform ADLs (bathing, care of mouth/teeth, toileting, grooming, dressing, etc.)  - Assess/evaluate cause of self-care deficits   - Assess range of motion  - Assess patient's mobility; develop plan if impaired  - Assess patient's need for assistive devices and provide as appropriate  - Encourage maximum independence but intervene and supervise when necessary  - Involve family in performance of ADLs  - Assess for home care needs following discharge   - Consider OT consult to assist with ADL evaluation and planning for discharge  - Provide patient education as appropriate  Outcome: Progressing  Goal: Maintains/Returns to pre admission functional level  Description: INTERVENTIONS:  - Perform BMAT or MOVE assessment daily.   - Set and communicate daily mobility goal to care team and patient/family/caregiver.    - Collaborate with rehabilitation services on mobility goals if consulted  - Perform Range of Motion 3 times a day. - Reposition patient every 3 hours.   - Dangle patient 3 times a day  - Stand patient 3 times a day  - Ambulate patient 3 times a day  - Out of bed to chair 3 times a day   - Out of bed for meals 3 times a day  - Out of bed for toileting  - Record patient progress and toleration of activity level   Outcome: Progressing     Problem: Prexisting or High Potential for Compromised Skin Integrity  Goal: Skin integrity is maintained or improved  Description: INTERVENTIONS:  - Identify patients at risk for skin breakdown  - Assess and monitor skin integrity  - Assess and monitor nutrition and hydration status  - Monitor labs   - Assess for incontinence   - Turn and reposition patient  - Assist with mobility/ambulation  - Relieve pressure over bony prominences  - Avoid friction and shearing  - Provide appropriate hygiene as needed including keeping skin clean and dry  - Evaluate need for skin moisturizer/barrier cream  - Collaborate with interdisciplinary team   - Patient/family teaching  - Consider wound care consult   Outcome: Progressing

## 2023-06-27 NOTE — PLAN OF CARE
Problem: Potential for Falls  Goal: Patient will remain free of falls  Description: INTERVENTIONS:  - Educate patient/family on patient safety including physical limitations  - Instruct patient to call for assistance with activity   - Consult OT/PT to assist with strengthening/mobility   - Keep Call bell within reach  - Keep bed low and locked with side rails adjusted as appropriate  - Keep care items and personal belongings within reach  - Initiate and maintain comfort rounds  - Make Fall Risk Sign visible to staff  - Offer Toileting every 3 Hours, in advance of need  - Initiate/Maintain 3alarm  - Obtain necessary fall risk management equipment: 3  - Apply yellow socks and bracelet for high fall risk patients  - Consider moving patient to room near nurses station  6/27/2023 0740 by Belen Herr RN  Outcome: Progressing  6/27/2023 0740 by Belen Herr RN  Outcome: Progressing     Problem: MOBILITY - ADULT  Goal: Maintain or return to baseline ADL function  Description: INTERVENTIONS:  -  Assess patient's ability to carry out ADLs; assess patient's baseline for ADL function and identify physical deficits which impact ability to perform ADLs (bathing, care of mouth/teeth, toileting, grooming, dressing, etc.)  - Assess/evaluate cause of self-care deficits   - Assess range of motion  - Assess patient's mobility; develop plan if impaired  - Assess patient's need for assistive devices and provide as appropriate  - Encourage maximum independence but intervene and supervise when necessary  - Involve family in performance of ADLs  - Assess for home care needs following discharge   - Consider OT consult to assist with ADL evaluation and planning for discharge  - Provide patient education as appropriate  6/27/2023 0740 by Belen Herr RN  Outcome: Progressing  6/27/2023 0740 by Belen Herr RN  Outcome: Progressing  Goal: Maintains/Returns to pre admission functional level  Description: INTERVENTIONS:  - Perform BMAT or MOVE assessment daily.   - Set and communicate daily mobility goal to care team and patient/family/caregiver. - Collaborate with rehabilitation services on mobility goals if consulted  - Perform Range of Motion 3 times a day. - Reposition patient every 3 hours.   - Dangle patient 3 times a day  - Stand patient 3 times a day  - Ambulate patient 3 times a day  - Out of bed to chair 3 times a day   - Out of bed for meals 3 times a day  - Out of bed for toileting  - Record patient progress and toleration of activity level   6/27/2023 0740 by Mynor Hoffman RN  Outcome: Progressing  6/27/2023 0740 by Mynor Hoffman RN  Outcome: Progressing     Problem: Prexisting or High Potential for Compromised Skin Integrity  Goal: Skin integrity is maintained or improved  Description: INTERVENTIONS:  - Identify patients at risk for skin breakdown  - Assess and monitor skin integrity  - Assess and monitor nutrition and hydration status  - Monitor labs   - Assess for incontinence   - Turn and reposition patient  - Assist with mobility/ambulation  - Relieve pressure over bony prominences  - Avoid friction and shearing  - Provide appropriate hygiene as needed including keeping skin clean and dry  - Evaluate need for skin moisturizer/barrier cream  - Collaborate with interdisciplinary team   - Patient/family teaching  - Consider wound care consult   6/27/2023 0740 by Mynor Hoffman RN  Outcome: Progressing  6/27/2023 0740 by Mynor Hoffman RN  Outcome: Progressing

## 2023-06-27 NOTE — PROGRESS NOTES
[facility]  Progress Note  Name: Pilar Mcdaniels I  MRN: [id number]  Unit/Bed#: [facility] 926-01 I Date of Admission: 6/25/2023   Date of Service: 6/27/2023 I Hospital Day: 2    Assessment/Plan   * Generalized weakness  Assessment & Plan  · Presenting with increased weakness over the past several days with generalized malaise, 3 days nausea/vomiting as well as intermittent diarrhea. Of note was recently hospitalized here also with generalized weakness and acute cystitis, appears rehab was recommended by PT/OT however patient refused this and went home with home therapies. · Possibly multifactorial in the setting of electrolyte abnormalities, possible pyelonephritis, and a nausea/vomiting with possible reflux with esophageal dysfunction as below. Management of these individual problems as per associated plans  · PT/OT evaluation will be requested    Bacteremia due to Escherichia coli  Assessment & Plan  · 1 out of 2 blood cultures positive for E. Coli  · Suspect in setting of acute pyelonephritis  · Follow up with sensitivities    Pyelonephritis  Assessment & Plan  · Was treated for acute cystitis during recent admission, CT ab/pelvis now with findings suspicious for right pyelonephritis. Patient without complaints of right side abdominal/flank pain and with benign examination. UA with WBCs and bacteria. Elevated procalcitonin and WBC is noted  · Received cefepime during ED evaluation, will continue with ceftriaxone for now pending the result of blood cultures x2 along with UA/urine culture  · Trend WC, temperature curve, hemodynamics    Dysphagia  Assessment & Plan  · Speech eval appreciated  · VBS today shows no swallowing function  · D/W GI and they will eval  · Strict NPO for now  · Discussed feeding tube with patient and niece who is POA. They would like to discuss as a family further.      Nausea and vomiting  Assessment & Plan  · 3 days of nausea/vomiting per patient, CT abdomen/pelvis with concerns for right-sided pyelonephritis along with distention of upper esophagus with scattered degree in esophagus for which relation to reflux with esophageal dysfunction was not excluded  · Management of the pyelonephritis as above. Start IV Protonix for the potential reflux and assess response. · Appears to have resolved    Hypokalemia  Assessment & Plan  · Potassium 2.7 on admission, replacement initiated during ED evaluation. Possibly due to the nausea/vomiting and poor oral intake  · continue with replacement as needed    Hyponatremia  Assessment & Plan  · Sodium 131 on admission, possibly due to poor recent oral intake and nausea/vomiting  · Improved with IVF    Chronic pain syndrome  Assessment & Plan  · Patient reporting no increase in chronic pain at this time  · PDMP reviewed, we will continue with low-dose oxycodone as needed  · IV Dilaudid while NPO  · Patient c/o bilateral heel pain  - Voltaren gel to heals as well    Rheumatoid arthritis involving multiple sites with positive rheumatoid factor (HCC)  Assessment & Plan  · Home dose Plaquenil 200 mg in a.m. and 100 mg in PM, methotrexate 7.5 mg every Thursday  · On hold secondary to dysphagia    Compression fracture of L5 vertebra with routine healing  Assessment & Plan  · Again noted on CT imaging worsened from prior examination, additionally with marked compression deformity at L4. Patient denying any increase in back pain from baseline  · We will continue to monitor exam and pain, if increases further work-up as appropriate    Protein calorie malnutrition [facility])  Assessment & Plan  Malnutrition Findings:                                 BMI Findings: Body mass index is 22.03 kg/m². VTE Pharmacologic Prophylaxis: VTE Score: 6 High Risk (Score >/= 5) - Pharmacological DVT Prophylaxis Ordered: heparin. Sequential Compression Devices Ordered.     Patient Centered Rounds: I performed bedside rounds with nursing staff today. Discussions with Specialists or Other Care Team Provider: case management, GI, speech therapy    Education and Discussions with Family / Patient: Updated  (niece) via phone. Total Time Spent on Date of Encounter in care of patient: 35 minutes This time was spent on one or more of the following: performing physical exam; counseling and coordination of care; obtaining or reviewing history; documenting in the medical record; reviewing/ordering tests, medications or procedures; communicating with other healthcare professionals and discussing with patient's family/caregivers. Current Length of Stay: 2 day(s)  Current Patient Status: Inpatient   Certification Statement: The patient will continue to require additional inpatient hospital stay due to IV antibiotics  Discharge Plan: Anticipate discharge in 48-72 hrs to home with home services. Code Status: Level 3 - DNAR and DNI    Subjective:   Patient states she feels about the same. Still with weakness. C/O bilateral heel pains. Speech reports that patient does not have swallowing function. Objective:     Vitals:   Temp (24hrs), Av.6 °F (37 °C), Min:98.1 °F (36.7 °C), Max:99.4 °F (37.4 °C)    Temp:  [98.1 °F (36.7 °C)-99.4 °F (37.4 °C)] 98.6 °F (37 °C)  HR:  [91-94] 92  Resp:  [16-18] 16  BP: (156-174)/(83-89) 159/86  SpO2:  [95 %-99 %] 96 %  Body mass index is 22.03 kg/m². Input and Output Summary (last 24 hours): Intake/Output Summary (Last 24 hours) at 2023 1225  Last data filed at 2023 3434  Gross per 24 hour   Intake 3367.21 ml   Output 2050 ml   Net 1317.21 ml       Physical Exam:   Physical Exam  Constitutional:       Appearance: She is ill-appearing. Comments: Thin with muscle wasting   Cardiovascular:      Rate and Rhythm: Normal rate and regular rhythm. Heart sounds: No murmur heard. Pulmonary:      Effort: Pulmonary effort is normal.      Breath sounds: Normal breath sounds. Abdominal:      General: Bowel sounds are normal. There is no distension. Palpations: Abdomen is soft. Tenderness: There is no abdominal tenderness. Skin:     General: Skin is warm and dry. Neurological:      General: No focal deficit present. Mental Status: She is alert and oriented to person, place, and time. Psychiatric:         Mood and Affect: Mood normal.          Additional Data:     Labs:  Results from last 7 days   Lab Units 06/27/23  0628 06/26/23  0502 06/25/23  1649   WBC Thousand/uL 13.43*   < > 17.46*   HEMOGLOBIN g/dL 11.8   < > 11.3*   HEMATOCRIT % 39.0   < > 36.7   PLATELETS Thousands/uL 252   < > 241   NEUTROS PCT %  --   --  86*   LYMPHS PCT %  --   --  3*   MONOS PCT %  --   --  10   EOS PCT %  --   --  0    < > = values in this interval not displayed. Results from last 7 days   Lab Units 06/27/23  0628   SODIUM mmol/L 135   POTASSIUM mmol/L 3.5   CHLORIDE mmol/L 103   CO2 mmol/L 22   BUN mg/dL 9   CREATININE mg/dL 0.37*   ANION GAP mmol/L 10   CALCIUM mg/dL 8.8   ALBUMIN g/dL 2.2*   TOTAL BILIRUBIN mg/dL 0.29   ALK PHOS U/L 152*   ALT U/L 23   AST U/L 19   GLUCOSE RANDOM mg/dL 59*     Results from last 7 days   Lab Units 06/25/23  1649   INR  1.00             Results from last 7 days   Lab Units 06/26/23  0434 06/25/23  1649   LACTIC ACID mmol/L  --  1.8   PROCALCITONIN ng/ml 3.47* 3.48*       Lines/Drains:  Invasive Devices     Peripheral Intravenous Line  Duration           Peripheral IV 06/25/23 Distal;Right;Ventral (anterior) Forearm 1 day    Peripheral IV 06/26/23 Left Antecubital 1 day                      Imaging: Reviewed radiology reports from this admission including: abdominal/pelvic CT    Recent Cultures (last 7 days):   Results from last 7 days   Lab Units 06/26/23  0256 06/25/23  1649   BLOOD CULTURE   --  No Growth at 24 hrs.    GRAM STAIN RESULT   --  Gram negative rods*   URINE CULTURE  70,000-79,000 cfu/ml  --        Last 24 Hours Medication List: Current Facility-Administered Medications   Medication Dose Route Frequency Provider Last Rate   • acetaminophen  650 mg Rectal Q4H PRN Beverly Wells PA-C     • [START ON 6/28/2023] cefTRIAXone  1,000 mg Intravenous Q24H Tyler Rubi MD     • Diclofenac Sodium  2 g Topical 4x Daily Beverly Wells PA-C     • heparin (porcine)  5,000 Units Subcutaneous [facility] Creta Janel, DO     • HYDROmorphone  0.2 mg Intravenous Q4H PRN Beverly Wells PA-C     • multi-electrolyte  100 mL/hr Intravenous Continuous Creta Janel,  mL/hr (06/27/23 0746)   • ondansetron  4 mg Intravenous Q6H PRN Creta Janel, DO     • pantoprazole  40 mg Intravenous Q24H [address] Beverly Wlels PA-C          Today, Patient Was Seen By: Beverly Wells PA-C    **Please Note: This note may have been constructed using a voice recognition system. **

## 2023-06-28 PROBLEM — E44.0 MODERATE PROTEIN-CALORIE MALNUTRITION (HCC): Status: ACTIVE | Noted: 2023-06-28

## 2023-06-28 LAB
ANION GAP SERPL CALCULATED.3IONS-SCNC: 10 MMOL/L
BACTERIA BLD CULT: ABNORMAL
BUN SERPL-MCNC: 8 MG/DL (ref 5–25)
CALCIUM SERPL-MCNC: 8.4 MG/DL (ref 8.3–10.1)
CHLORIDE SERPL-SCNC: 103 MMOL/L (ref 96–108)
CO2 SERPL-SCNC: 24 MMOL/L (ref 21–32)
CREAT SERPL-MCNC: 0.32 MG/DL (ref 0.6–1.3)
E COLI DNA BLD POS QL NAA+NON-PROBE: DETECTED
ERYTHROCYTE [DISTWIDTH] IN BLOOD BY AUTOMATED COUNT: 16 % (ref 11.6–15.1)
GFR SERPL CREATININE-BSD FRML MDRD: 106 ML/MIN/1.73SQ M
GLUCOSE SERPL-MCNC: 60 MG/DL (ref 65–140)
GRAM STN SPEC: ABNORMAL
HCT VFR BLD AUTO: 31.6 % (ref 34.8–46.1)
HGB BLD-MCNC: 10.2 G/DL (ref 11.5–15.4)
MCH RBC QN AUTO: 26.8 PG (ref 26.8–34.3)
MCHC RBC AUTO-ENTMCNC: 32.3 G/DL (ref 31.4–37.4)
MCV RBC AUTO: 83 FL (ref 82–98)
PLATELET # BLD AUTO: 262 THOUSANDS/UL (ref 149–390)
PMV BLD AUTO: 9.2 FL (ref 8.9–12.7)
POTASSIUM SERPL-SCNC: 2.7 MMOL/L (ref 3.5–5.3)
RBC # BLD AUTO: 3.8 MILLION/UL (ref 3.81–5.12)
SARS-COV-2 RNA RESP QL NAA+PROBE: NEGATIVE
SODIUM SERPL-SCNC: 137 MMOL/L (ref 135–147)
WBC # BLD AUTO: 9.75 THOUSAND/UL (ref 4.31–10.16)

## 2023-06-28 PROCEDURE — C9113 INJ PANTOPRAZOLE SODIUM, VIA: HCPCS | Performed by: PHYSICIAN ASSISTANT

## 2023-06-28 PROCEDURE — 80048 BASIC METABOLIC PNL TOTAL CA: CPT | Performed by: PHYSICIAN ASSISTANT

## 2023-06-28 PROCEDURE — 87635 SARS-COV-2 COVID-19 AMP PRB: CPT | Performed by: INTERNAL MEDICINE

## 2023-06-28 PROCEDURE — 85027 COMPLETE CBC AUTOMATED: CPT | Performed by: PHYSICIAN ASSISTANT

## 2023-06-28 PROCEDURE — 99232 SBSQ HOSP IP/OBS MODERATE 35: CPT | Performed by: PHYSICIAN ASSISTANT

## 2023-06-28 RX ORDER — POTASSIUM CHLORIDE 14.9 MG/ML
20 INJECTION INTRAVENOUS
Status: COMPLETED | OUTPATIENT
Start: 2023-06-28 | End: 2023-06-28

## 2023-06-28 RX ORDER — POTASSIUM CHLORIDE 14.9 MG/ML
20 INJECTION INTRAVENOUS
Status: DISCONTINUED | OUTPATIENT
Start: 2023-06-28 | End: 2023-06-28

## 2023-06-28 RX ORDER — MAGNESIUM SULFATE 1 G/100ML
1 INJECTION INTRAVENOUS ONCE
Status: COMPLETED | OUTPATIENT
Start: 2023-06-28 | End: 2023-06-28

## 2023-06-28 RX ADMIN — POTASSIUM CHLORIDE 20 MEQ: 14.9 INJECTION, SOLUTION INTRAVENOUS at 11:15

## 2023-06-28 RX ADMIN — DICLOFENAC SODIUM 2 G: 10 GEL TOPICAL at 10:52

## 2023-06-28 RX ADMIN — HEPARIN SODIUM 5000 UNITS: 5000 INJECTION INTRAVENOUS; SUBCUTANEOUS at 13:14

## 2023-06-28 RX ADMIN — CEFTRIAXONE SODIUM 1000 MG: 10 INJECTION, POWDER, FOR SOLUTION INTRAVENOUS at 08:08

## 2023-06-28 RX ADMIN — HEPARIN SODIUM 5000 UNITS: 5000 INJECTION INTRAVENOUS; SUBCUTANEOUS at 06:40

## 2023-06-28 RX ADMIN — SODIUM CHLORIDE, SODIUM GLUCONATE, SODIUM ACETATE, POTASSIUM CHLORIDE, MAGNESIUM CHLORIDE, SODIUM PHOSPHATE, DIBASIC, AND POTASSIUM PHOSPHATE 100 ML/HR: .53; .5; .37; .037; .03; .012; .00082 INJECTION, SOLUTION INTRAVENOUS at 09:12

## 2023-06-28 RX ADMIN — HYDROMORPHONE HYDROCHLORIDE 0.2 MG: 0.2 INJECTION, SOLUTION INTRAMUSCULAR; INTRAVENOUS; SUBCUTANEOUS at 21:14

## 2023-06-28 RX ADMIN — POTASSIUM CHLORIDE 20 MEQ: 14.9 INJECTION, SOLUTION INTRAVENOUS at 08:11

## 2023-06-28 RX ADMIN — POTASSIUM CHLORIDE 20 MEQ: 14.9 INJECTION, SOLUTION INTRAVENOUS at 09:12

## 2023-06-28 RX ADMIN — DICLOFENAC SODIUM 2 G: 10 GEL TOPICAL at 16:43

## 2023-06-28 RX ADMIN — HEPARIN SODIUM 5000 UNITS: 5000 INJECTION INTRAVENOUS; SUBCUTANEOUS at 21:14

## 2023-06-28 RX ADMIN — SODIUM CHLORIDE, SODIUM GLUCONATE, SODIUM ACETATE, POTASSIUM CHLORIDE, MAGNESIUM CHLORIDE, SODIUM PHOSPHATE, DIBASIC, AND POTASSIUM PHOSPHATE 100 ML/HR: .53; .5; .37; .037; .03; .012; .00082 INJECTION, SOLUTION INTRAVENOUS at 06:40

## 2023-06-28 RX ADMIN — SODIUM CHLORIDE, SODIUM GLUCONATE, SODIUM ACETATE, POTASSIUM CHLORIDE, MAGNESIUM CHLORIDE, SODIUM PHOSPHATE, DIBASIC, AND POTASSIUM PHOSPHATE 100 ML/HR: .53; .5; .37; .037; .03; .012; .00082 INJECTION, SOLUTION INTRAVENOUS at 16:43

## 2023-06-28 RX ADMIN — PANTOPRAZOLE SODIUM 40 MG: 40 INJECTION, POWDER, FOR SOLUTION INTRAVENOUS at 08:09

## 2023-06-28 RX ADMIN — MAGNESIUM SULFATE HEPTAHYDRATE 1 G: 1 INJECTION, SOLUTION INTRAVENOUS at 08:14

## 2023-06-28 RX ADMIN — POTASSIUM CHLORIDE 20 MEQ: 14.9 INJECTION, SOLUTION INTRAVENOUS at 13:13

## 2023-06-28 RX ADMIN — DICLOFENAC SODIUM 2 G: 10 GEL TOPICAL at 21:15

## 2023-06-28 NOTE — PLAN OF CARE
Problem: Potential for Falls  Goal: Patient will remain free of falls  Description: INTERVENTIONS:  - Educate patient/family on patient safety including physical limitations  - Instruct patient to call for assistance with activity   - Consult OT/PT to assist with strengthening/mobility   - Keep Call bell within reach  - Keep bed low and locked with side rails adjusted as appropriate  - Keep care items and personal belongings within reach  - Initiate and maintain comfort rounds  - Make Fall Risk Sign visible to staff  - Offer Toileting every 2 Hours, in advance of need  - Initiate/Maintain 2alarm  - Obtain necessary fall risk management equipment: 2  - Apply yellow socks and bracelet for high fall risk patients  - Consider moving patient to room near nurses station  Outcome: Progressing     Problem: MOBILITY - ADULT  Goal: Maintain or return to baseline ADL function  Description: INTERVENTIONS:  -  Assess patient's ability to carry out ADLs; assess patient's baseline for ADL function and identify physical deficits which impact ability to perform ADLs (bathing, care of mouth/teeth, toileting, grooming, dressing, etc.)  - Assess/evaluate cause of self-care deficits   - Assess range of motion  - Assess patient's mobility; develop plan if impaired  - Assess patient's need for assistive devices and provide as appropriate  - Encourage maximum independence but intervene and supervise when necessary  - Involve family in performance of ADLs  - Assess for home care needs following discharge   - Consider OT consult to assist with ADL evaluation and planning for discharge  - Provide patient education as appropriate  Outcome: Progressing  Goal: Maintains/Returns to pre admission functional level  Description: INTERVENTIONS:  - Perform BMAT or MOVE assessment daily.   - Set and communicate daily mobility goal to care team and patient/family/caregiver.    - Collaborate with rehabilitation services on mobility goals if consulted  - Perform Range of Motion 2 times a day. - Reposition patient every 2 hours. - Dangle patient 2 times a day  - Stand patient 2 times a day  - Ambulate patient 2 times a day  - Out of bed to chair 2 times a day   - Out of bed for meals 2 times a day  - Out of bed for toileting  - Record patient progress and toleration of activity level   Outcome: Progressing     Problem: Prexisting or High Potential for Compromised Skin Integrity  Goal: Skin integrity is maintained or improved  Description: INTERVENTIONS:  - Identify patients at risk for skin breakdown  - Assess and monitor skin integrity  - Assess and monitor nutrition and hydration status  - Monitor labs   - Assess for incontinence   - Turn and reposition patient  - Assist with mobility/ambulation  - Relieve pressure over bony prominences  - Avoid friction and shearing  - Provide appropriate hygiene as needed including keeping skin clean and dry  - Evaluate need for skin moisturizer/barrier cream  - Collaborate with interdisciplinary team   - Patient/family teaching  - Consider wound care consult   Outcome: Progressing     Problem: Nutrition/Hydration-ADULT  Goal: Nutrient/Hydration intake appropriate for improving, restoring or maintaining nutritional needs  Description: Monitor and assess patient's nutrition/hydration status for malnutrition. Collaborate with interdisciplinary team and initiate plan and interventions as ordered. Monitor patient's weight and dietary intake as ordered or per policy. Utilize nutrition screening tool and intervene as necessary. Determine patient's food preferences and provide high-protein, high-caloric foods as appropriate.      INTERVENTIONS:  - Monitor oral intake, urinary output, labs, and treatment plans  - Assess nutrition and hydration status and recommend course of action  - Evaluate amount of meals eaten  - Assist patient with eating if necessary   - Allow adequate time for meals  - Recommend/ encourage appropriate diets, oral nutritional supplements, and vitamin/mineral supplements  - Order, calculate, and assess calorie counts as needed  - Recommend, monitor, and adjust tube feedings and TPN/PPN based on assessed needs  - Assess need for intravenous fluids  - Provide specific nutrition/hydration education as appropriate  - Include patient/family/caregiver in decisions related to nutrition  Outcome: Progressing

## 2023-06-28 NOTE — ASSESSMENT & PLAN NOTE
· Presenting with increased weakness over the past several days with generalized malaise, 3 days nausea/vomiting as well as intermittent diarrhea. Of note was recently hospitalized here also with generalized weakness and acute cystitis, appears rehab was recommended by PT/OT however patient refused this and went home with home therapies. · Possibly multifactorial in the setting of electrolyte abnormalities, pyelonephritis, bacteremia, dysphagia  · PT/OT  - patient bed bound at baseline  · Have been discussing goals of care with patient and niece. Patient really defers to niece for all decisions. Plan is to see results of EGD tomorrow. Unsure if they would want a PEG tube. They are interested in talking to the hospice liaison about home hospice.

## 2023-06-28 NOTE — ASSESSMENT & PLAN NOTE
Malnutrition Findings:   Adult Malnutrition type:  (suspect acute and chronic components)  Adult Degree of Malnutrition: Malnutrition of moderate degree  Malnutrition Characteristics: Fat loss, Muscle loss, Inadequate energy                  360 Statement: Moderate malnutrition r/t suspect acute and chronic medical conditions contributing as evidenced by PO intake <50% of estimated needs for 5 days, mild muscle loss around clavicles, deltoids, temples; mild buccal fat pad loss. Treatment to be determined pending further workup, family discussion, etc.    BMI Findings: Body mass index is 22.18 kg/m².

## 2023-06-28 NOTE — HOSPICE NOTE
Spoke with pt Wilian hughes, via [place]. Per Wilian Baer, pt is not ready for hospice services at this time and would like to proceed with EGD and possible PEG placement and to continue tube feed. Will leave hospice episode until d/c. Reach out to liaison if there are any changes.

## 2023-06-28 NOTE — CASE MANAGEMENT
Case Management Discharge Planning Note    Patient name Rockton Manual  Location [facility] 926/[facility] 926-01 MRN [id number]  : 1945 Date 2023       Current Admission Date: 2023  Current Admission Diagnosis:Generalized weakness   Patient Active Problem List    Diagnosis Date Noted   • Moderate protein-calorie malnutrition ([address]) 2023   • Bacteremia due to Escherichia coli 2023   • Pyelonephritis 2023   • Nausea and vomiting 2023   • Hypokalemia 2023   • Hypomagnesemia 2023   • Generalized weakness 2023   • Acute cystitis with hematuria 2023   • Urinary retention 2023   • Immunodeficiency due to drugs ([address]) 2023   • Chronic venous hypertension (idiopathic) with ulcer of left lower extremity (CODE) ([address]) 2023   • Diabetic ulcer of left midfoot associated with type 2 diabetes mellitus, with bone involvement without evidence of necrosis ([address]) 2023   • Other diseases of thymus ([address]) 2023   • Protein calorie malnutrition ([address]) 2023   • Weight loss 2023   • Acute encephalopathy 2023   • Polypharmacy 2023   • Hypercalcemia 2023   • Compression fracture of L5 vertebra with routine healing 2023   • Acute low back pain 2023   • Effusion of right shoulder joint 2023   • Arm bruise, right, initial encounter 2023   • Concern for cerebral contusion 2023   • Dysphagia 2023   • 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   • Non-healing open wound of heel, initial encounter 10/12/2022   • Anemia 2022   • Pressure injury of left foot, stage 4 ([address])    • Hyponatremia 2022   • Venous insufficiency 2021   • Encounter for annual wellness exam in Medicare patient 2021   • S/P reverse total shoulder arthroplasty, left 2021   • Rupture long head biceps tendon, left, initial encounter 2021   • Shoulder dislocation 2021 • Depression 09/09/2020   • Other forms of systemic lupus erythematosus ([address]) 08/31/2020   • Vitamin D deficiency 07/24/2019   • Chronic pain syndrome 04/18/2019   • Rheumatoid arthritis involving multiple sites with positive rheumatoid factor ([address]) 03/04/2019   • Ambulatory dysfunction 11/13/2018   • Closed compression fracture of L3 lumbar vertebra 11/13/2018   • S/P total hip arthroplasty 11/27/2017   • Anxiety 08/30/2017   • Urinary tract infection without hematuria 08/30/2017   • RLS (restless legs syndrome) 08/30/2017   • RBBB 08/23/2017   • Age-related osteoporosis with current pathological fracture with routine healing 04/18/2017   • Hypothyroidism due to Hashimoto's thyroiditis 12/13/2016      LOS (days): 3  Geometric Mean LOS (GMLOS) (days): 2.80  Days to GMLOS:0.2     OBJECTIVE:  Risk of Unplanned Readmission Score: 26.24         Current admission status: Inpatient   Preferred Pharmacy:   [address] [place] [address]  Phone: [phone] Fax: [address] [place] [address], [address] [place] [address]  [address] [place] [address] [address] [address]  Phone: [phone] Fax: [phone]    Primary Care Provider: Riki Chaves MD    Primary Insurance: MEDICARE  Secondary Insurance:     DISCHARGE DETAILS:          Additional Comments: Per provider, pt interested in speaking with hospice liaison to learn more about home hospice and would like pt's niece to be a part of conversation. CM sent referral to [address] [place] [address] via [employer]. CM to follow.

## 2023-06-28 NOTE — QUICK NOTE
Discussed with patient and her niece who makes medical decisions for her. Both are agreeable to perform EGD to investigate cause of dysphagia. They are also agreeable that if no causes found that PEG tube can be placed. All questions were answered. We will plan for EGD with PEG tube placement on 6/29/2023.

## 2023-06-28 NOTE — PROGRESS NOTES
[facility]  Progress Note  Name: Suri Morgan I  MRN: [id number]  Unit/Bed#: [facility] 926-01 I Date of Admission: 6/25/2023   Date of Service: 6/28/2023 I Hospital Day: 3    Assessment/Plan   * Generalized weakness  Assessment & Plan  · Presenting with increased weakness over the past several days with generalized malaise, 3 days nausea/vomiting as well as intermittent diarrhea. Of note was recently hospitalized here also with generalized weakness and acute cystitis, appears rehab was recommended by PT/OT however patient refused this and went home with home therapies. · Possibly multifactorial in the setting of electrolyte abnormalities, pyelonephritis, bacteremia, dysphagia  · PT/OT  - patient bed bound at baseline  · Have been discussing goals of care with patient and niece. Patient really defers to niece for all decisions. Plan is to see results of EGD tomorrow. Unsure if they would want a PEG tube. They are interested in talking to the hospice liaison about home hospice. Bacteremia due to Escherichia coli  Assessment & Plan  · 1 out of 2 blood cultures positive for E. Coli  · Suspect in setting of acute pyelonephritis  · Continue Ceftriaxone    Pyelonephritis  Assessment & Plan  · Was treated for acute cystitis during recent admission, CT ab/pelvis now with findings suspicious for right pyelonephritis. Patient without complaints of right side abdominal/flank pain and with benign examination. UA with WBCs and bacteria. Elevated procalcitonin and WBC is noted  · Received cefepime during ED evaluation, will continue with ceftriaxone which is sensitive  · Trend WC, temperature curve, hemodynamics    Dysphagia  Assessment & Plan  · Speech eval appreciated  · VBS shows no swallowing function  · D/W GI and they are planning for EGD and possible PEG placement 6/29  · Strict NPO for now  · Discussed feeding tube with patient and niece who is POA.  They would like to discuss as a family further. Nausea and vomiting  Assessment & Plan  · 3 days of nausea/vomiting per patient, CT abdomen/pelvis with concerns for right-sided pyelonephritis along with distention of upper esophagus with scattered degree in esophagus for which relation to reflux with esophageal dysfunction was not excluded  · Management of the pyelonephritis as above. Start IV Protonix for the potential reflux and assess response. · Appears to have resolved    Hypokalemia  Assessment & Plan  · Potassium 2.7 on admission, replacement initiated during ED evaluation. Possibly due to the nausea/vomiting and poor oral intake  · continue with replacement as needed    Hyponatremia  Assessment & Plan  · Sodium 131 on admission, possibly due to poor recent oral intake and nausea/vomiting  · Improved with IVF    Chronic pain syndrome  Assessment & Plan  · Patient reporting no increase in chronic pain at this time  · PDMP reviewed, we will continue with low-dose oxycodone as needed  · IV Dilaudid while NPO  · Patient c/o bilateral heel pain  - Voltaren gel to heals as well    Rheumatoid arthritis involving multiple sites with positive rheumatoid factor (HCC)  Assessment & Plan  · Home dose Plaquenil 200 mg in a.m. and 100 mg in PM, methotrexate 7.5 mg every Thursday  · On hold secondary to dysphagia    Compression fracture of L5 vertebra with routine healing  Assessment & Plan  · Again noted on CT imaging worsened from prior examination, additionally with marked compression deformity at L4.   Patient denying any increase in back pain from baseline  · We will continue to monitor exam and pain, if increases further work-up as appropriate    Moderate protein-calorie malnutrition (HCC)  Assessment & Plan  Malnutrition Findings:   Adult Malnutrition type:  (suspect acute and chronic components)  Adult Degree of Malnutrition: Malnutrition of moderate degree  Malnutrition Characteristics: Fat loss, Muscle loss, Inadequate energy 360 Statement: Moderate malnutrition r/t suspect acute and chronic medical conditions contributing as evidenced by PO intake <50% of estimated needs for 5 days, mild muscle loss around clavicles, deltoids, temples; mild buccal fat pad loss. Treatment to be determined pending further workup, family discussion, etc.    BMI Findings: Body mass index is 22.18 kg/m². VTE Pharmacologic Prophylaxis: VTE Score: 6 High Risk (Score >/= 5) - Pharmacological DVT Prophylaxis Ordered: heparin. Sequential Compression Devices Ordered. Patient Centered Rounds: I performed bedside rounds with nursing staff today. Discussions with Specialists or Other Care Team Provider: case management, GI    Education and Discussions with Family / Patient: Updated  (niece) at bedside. Total Time Spent on Date of Encounter in care of patient: 35 minutes This time was spent on one or more of the following: performing physical exam; counseling and coordination of care; obtaining or reviewing history; documenting in the medical record; reviewing/ordering tests, medications or procedures; communicating with other healthcare professionals and discussing with patient's family/caregivers. Current Length of Stay: 3 day(s)  Current Patient Status: Inpatient   Certification Statement: The patient will continue to require additional inpatient hospital stay due to EGD, IV antibiotics  Discharge Plan: Anticipate discharge in 48 hrs to home with home services. Code Status: Level 3 - DNAR and DNI    Subjective:   Patient feels about the same    Objective:     Vitals:   Temp (24hrs), Av.8 °F (37.1 °C), Min:98.6 °F (37 °C), Max:98.9 °F (37.2 °C)    Temp:  [98.6 °F (37 °C)-98.9 °F (37.2 °C)] 98.6 °F (37 °C)  HR:  [83-88] 88  Resp:  [16-18] 16  BP: (128-149)/(68-86) 149/86  SpO2:  [95 %-99 %] 97 %  Body mass index is 22.18 kg/m². Input and Output Summary (last 24 hours):      Intake/Output Summary (Last 24 hours) at 6/28/2023 1254  Last data filed at 6/28/2023 1222  Gross per 24 hour   Intake 2310 ml   Output 2050 ml   Net 260 ml       Physical Exam:   Physical Exam  Constitutional:       Comments: Thin with muscle wasting   Cardiovascular:      Rate and Rhythm: Normal rate and regular rhythm. Heart sounds: No murmur heard. Pulmonary:      Effort: Pulmonary effort is normal.      Breath sounds: Normal breath sounds. Abdominal:      General: Bowel sounds are normal. There is no distension. Palpations: Abdomen is soft. Tenderness: There is no abdominal tenderness. Skin:     General: Skin is warm and dry. Neurological:      General: No focal deficit present. Mental Status: She is alert and oriented to person, place, and time. Psychiatric:         Mood and Affect: Mood normal.          Additional Data:     Labs:  Results from last 7 days   Lab Units 06/28/23  0512 06/26/23  0502 06/25/23  1649   WBC Thousand/uL 9.75   < > 17.46*   HEMOGLOBIN g/dL 10.2*   < > 11.3*   HEMATOCRIT % 31.6*   < > 36.7   PLATELETS Thousands/uL 262   < > 241   NEUTROS PCT %  --   --  86*   LYMPHS PCT %  --   --  3*   MONOS PCT %  --   --  10   EOS PCT %  --   --  0    < > = values in this interval not displayed.      Results from last 7 days   Lab Units 06/28/23  0512 06/27/23  0628   SODIUM mmol/L 137 135   POTASSIUM mmol/L 2.7* 3.5   CHLORIDE mmol/L 103 103   CO2 mmol/L 24 22   BUN mg/dL 8 9   CREATININE mg/dL 0.32* 0.37*   ANION GAP mmol/L 10 10   CALCIUM mg/dL 8.4 8.8   ALBUMIN g/dL  --  2.2*   TOTAL BILIRUBIN mg/dL  --  0.29   ALK PHOS U/L  --  152*   ALT U/L  --  23   AST U/L  --  19   GLUCOSE RANDOM mg/dL 60* 59*     Results from last 7 days   Lab Units 06/25/23  1649   INR  1.00             Results from last 7 days   Lab Units 06/26/23  0434 06/25/23  1649   LACTIC ACID mmol/L  --  1.8   PROCALCITONIN ng/ml 3.47* 3.48*       Lines/Drains:  Invasive Devices     Peripheral Intravenous Line  Duration Peripheral IV 06/25/23 Distal;Right;Ventral (anterior) Forearm 2 days    Peripheral IV 06/26/23 Left Antecubital 2 days          Drain  Duration           External Urinary Catheter <1 day                      Imaging: No pertinent imaging reviewed. Recent Cultures (last 7 days):   Results from last 7 days   Lab Units 06/26/23  0256 06/25/23  1649   BLOOD CULTURE   --  Escherichia coli*  No Growth at 48 hrs. GRAM STAIN RESULT   --  Gram negative rods*   URINE CULTURE  70,000-79,000 cfu/ml  --        Last 24 Hours Medication List:   Current Facility-Administered Medications   Medication Dose Route Frequency Provider Last Rate   • acetaminophen  650 mg Rectal Q4H PRN Sarita Santiago PA-C     • cefTRIAXone  1,000 mg Intravenous Q24H Magaly De La Rosa MD Stopped (06/28/23 8604)   • Diclofenac Sodium  2 g Topical 4x Daily Giuliano Gtz PA-C     • heparin (porcine)  5,000 Units Subcutaneous [facility] Raytae Hearing, DO     • HYDROmorphone  0.2 mg Intravenous Q4H PRN Giuliano Gtz PA-C     • multi-electrolyte  100 mL/hr Intravenous Continuous Raytae Hearing,  mL/hr (06/28/23 0912)   • ondansetron  4 mg Intravenous Q6H PRN Ashley Carrillo, DO     • pantoprazole  40 mg Intravenous Q24H [facility] Giuliano Gtz PA-C     • potassium chloride  20 mEq Intravenous Q2H Giuliano Gtz PA-C 20 mEq (06/28/23 1115)        Today, Patient Was Seen By: Giuliano Gtz PA-C    **Please Note: This note may have been constructed using a voice recognition system. **

## 2023-06-28 NOTE — ASSESSMENT & PLAN NOTE
· Speech eval appreciated  · VBS shows no swallowing function  · D/W GI and they are planning for EGD and possible PEG placement 6/29  · Strict NPO for now  · Discussed feeding tube with patient and niece who is POA. They would like to discuss as a family further.

## 2023-06-28 NOTE — PLAN OF CARE
Problem: Potential for Falls  Goal: Patient will remain free of falls  Description: INTERVENTIONS:  - Educate patient/family on patient safety including physical limitations  - Instruct patient to call for assistance with activity   - Consult OT/PT to assist with strengthening/mobility   - Keep Call bell within reach  - Keep bed low and locked with side rails adjusted as appropriate  - Keep care items and personal belongings within reach  - Initiate and maintain comfort rounds  - Make Fall Risk Sign visible to staff  - Offer Toileting every 3 Hours, in advance of need  - Initiate/Maintain 3alarm  - Obtain necessary fall risk management equipment: 3  - Apply yellow socks and bracelet for high fall risk patients  - Consider moving patient to room near nurses station  Outcome: Progressing     Problem: MOBILITY - ADULT  Goal: Maintain or return to baseline ADL function  Description: INTERVENTIONS:  -  Assess patient's ability to carry out ADLs; assess patient's baseline for ADL function and identify physical deficits which impact ability to perform ADLs (bathing, care of mouth/teeth, toileting, grooming, dressing, etc.)  - Assess/evaluate cause of self-care deficits   - Assess range of motion  - Assess patient's mobility; develop plan if impaired  - Assess patient's need for assistive devices and provide as appropriate  - Encourage maximum independence but intervene and supervise when necessary  - Involve family in performance of ADLs  - Assess for home care needs following discharge   - Consider OT consult to assist with ADL evaluation and planning for discharge  - Provide patient education as appropriate  Outcome: Progressing  Goal: Maintains/Returns to pre admission functional level  Description: INTERVENTIONS:  - Perform BMAT or MOVE assessment daily.   - Set and communicate daily mobility goal to care team and patient/family/caregiver.    - Collaborate with rehabilitation services on mobility goals if consulted  - Perform Range of Motion 3 times a day. - Reposition patient every 3 hours. - Dangle patient 3 times a day  - Stand patient 3 times a day  - Ambulate patient 3 times a day  - Out of bed to chair 3 times a day   - Out of bed for meals 3 times a day  - Out of bed for toileting  - Record patient progress and toleration of activity level   Outcome: Progressing     Problem: Prexisting or High Potential for Compromised Skin Integrity  Goal: Skin integrity is maintained or improved  Description: INTERVENTIONS:  - Identify patients at risk for skin breakdown  - Assess and monitor skin integrity  - Assess and monitor nutrition and hydration status  - Monitor labs   - Assess for incontinence   - Turn and reposition patient  - Assist with mobility/ambulation  - Relieve pressure over bony prominences  - Avoid friction and shearing  - Provide appropriate hygiene as needed including keeping skin clean and dry  - Evaluate need for skin moisturizer/barrier cream  - Collaborate with interdisciplinary team   - Patient/family teaching  - Consider wound care consult   Outcome: Progressing     Problem: Nutrition/Hydration-ADULT  Goal: Nutrient/Hydration intake appropriate for improving, restoring or maintaining nutritional needs  Description: Monitor and assess patient's nutrition/hydration status for malnutrition. Collaborate with interdisciplinary team and initiate plan and interventions as ordered. Monitor patient's weight and dietary intake as ordered or per policy. Utilize nutrition screening tool and intervene as necessary. Determine patient's food preferences and provide high-protein, high-caloric foods as appropriate.      INTERVENTIONS:  - Monitor oral intake, urinary output, labs, and treatment plans  - Assess nutrition and hydration status and recommend course of action  - Evaluate amount of meals eaten  - Assist patient with eating if necessary   - Allow adequate time for meals  - Recommend/ encourage appropriate diets, oral nutritional supplements, and vitamin/mineral supplements  - Order, calculate, and assess calorie counts as needed  - Recommend, monitor, and adjust tube feedings and TPN/PPN based on assessed needs  - Assess need for intravenous fluids  - Provide specific nutrition/hydration education as appropriate  - Include patient/family/caregiver in decisions related to nutrition  Outcome: Progressing

## 2023-06-28 NOTE — ASSESSMENT & PLAN NOTE
· Was treated for acute cystitis during recent admission, CT ab/pelvis now with findings suspicious for right pyelonephritis. Patient without complaints of right side abdominal/flank pain and with benign examination. UA with WBCs and bacteria.   Elevated procalcitonin and WBC is noted  · Received cefepime during ED evaluation, will continue with ceftriaxone which is sensitive  · Trend WC, temperature curve, hemodynamics

## 2023-06-28 NOTE — PROGRESS NOTES
-- Patient: Gracie Cali  -- MRN: [id number]  -- Aidin Request ID: [id number]  -- Level of care reserved: Hospice  -- Partner Reserved: [facility], [facility],  [facility] [phone]  -- Clinical needs requested:  -- Geography searched: [id number]  -- Start of Service:  -- Request sent: 10:33am EDT on 6/28/2023 by Susana Woodruff  -- Partner reserved: 11:23am EDT on 6/28/2023 by Susana Woodruff  -- Choice list shared: 11:22am EDT on 6/28/2023 by Susana Woodruff

## 2023-06-28 NOTE — ASSESSMENT & PLAN NOTE
· 1 out of 2 blood cultures positive for E.  Coli  · Suspect in setting of acute pyelonephritis  · Continue Ceftriaxone

## 2023-06-29 ENCOUNTER — ANESTHESIA EVENT (INPATIENT)
Dept: GASTROENTEROLOGY | Facility: HOSPITAL | Age: 78
End: 2023-06-29
Payer: MEDICARE

## 2023-06-29 ENCOUNTER — ANESTHESIA (INPATIENT)
Dept: GASTROENTEROLOGY | Facility: HOSPITAL | Age: 78
End: 2023-06-29
Payer: MEDICARE

## 2023-06-29 ENCOUNTER — APPOINTMENT (INPATIENT)
Dept: GASTROENTEROLOGY | Facility: HOSPITAL | Age: 78
DRG: 689 | End: 2023-06-29
Payer: MEDICARE

## 2023-06-29 LAB
ANION GAP SERPL CALCULATED.3IONS-SCNC: 15 MMOL/L
BUN SERPL-MCNC: 5 MG/DL (ref 5–25)
CALCIUM SERPL-MCNC: 8.8 MG/DL (ref 8.3–10.1)
CHLORIDE SERPL-SCNC: 101 MMOL/L (ref 96–108)
CO2 SERPL-SCNC: 19 MMOL/L (ref 21–32)
CREAT SERPL-MCNC: 0.28 MG/DL (ref 0.6–1.3)
ERYTHROCYTE [DISTWIDTH] IN BLOOD BY AUTOMATED COUNT: 15.9 % (ref 11.6–15.1)
GFR SERPL CREATININE-BSD FRML MDRD: 111 ML/MIN/1.73SQ M
GLUCOSE SERPL-MCNC: 63 MG/DL (ref 65–140)
GLUCOSE SERPL-MCNC: 64 MG/DL (ref 65–140)
HCT VFR BLD AUTO: 34.6 % (ref 34.8–46.1)
HGB BLD-MCNC: 10.7 G/DL (ref 11.5–15.4)
MCH RBC QN AUTO: 26.4 PG (ref 26.8–34.3)
MCHC RBC AUTO-ENTMCNC: 30.9 G/DL (ref 31.4–37.4)
MCV RBC AUTO: 85 FL (ref 82–98)
PLATELET # BLD AUTO: 303 THOUSANDS/UL (ref 149–390)
PMV BLD AUTO: 8.8 FL (ref 8.9–12.7)
POTASSIUM SERPL-SCNC: 3.1 MMOL/L (ref 3.5–5.3)
RBC # BLD AUTO: 4.06 MILLION/UL (ref 3.81–5.12)
SODIUM SERPL-SCNC: 135 MMOL/L (ref 135–147)
WBC # BLD AUTO: 8.99 THOUSAND/UL (ref 4.31–10.16)

## 2023-06-29 PROCEDURE — 43249 ESOPH EGD DILATION <30 MM: CPT | Performed by: INTERNAL MEDICINE

## 2023-06-29 PROCEDURE — 80048 BASIC METABOLIC PNL TOTAL CA: CPT | Performed by: PHYSICIAN ASSISTANT

## 2023-06-29 PROCEDURE — 82948 REAGENT STRIP/BLOOD GLUCOSE: CPT

## 2023-06-29 PROCEDURE — 99232 SBSQ HOSP IP/OBS MODERATE 35: CPT | Performed by: INTERNAL MEDICINE

## 2023-06-29 PROCEDURE — 0DH63UZ INSERTION OF FEEDING DEVICE INTO STOMACH, PERCUTANEOUS APPROACH: ICD-10-PCS | Performed by: STUDENT IN AN ORGANIZED HEALTH CARE EDUCATION/TRAINING PROGRAM

## 2023-06-29 PROCEDURE — C9113 INJ PANTOPRAZOLE SODIUM, VIA: HCPCS | Performed by: PHYSICIAN ASSISTANT

## 2023-06-29 PROCEDURE — C1726 CATH, BAL DIL, NON-VASCULAR: HCPCS

## 2023-06-29 PROCEDURE — 85027 COMPLETE CBC AUTOMATED: CPT | Performed by: PHYSICIAN ASSISTANT

## 2023-06-29 PROCEDURE — 43246 EGD PLACE GASTROSTOMY TUBE: CPT | Performed by: INTERNAL MEDICINE

## 2023-06-29 PROCEDURE — 0D718ZZ DILATION OF UPPER ESOPHAGUS, VIA NATURAL OR ARTIFICIAL OPENING ENDOSCOPIC: ICD-10-PCS | Performed by: STUDENT IN AN ORGANIZED HEALTH CARE EDUCATION/TRAINING PROGRAM

## 2023-06-29 RX ORDER — PROPOFOL 10 MG/ML
INJECTION, EMULSION INTRAVENOUS AS NEEDED
Status: DISCONTINUED | OUTPATIENT
Start: 2023-06-29 | End: 2023-06-29

## 2023-06-29 RX ORDER — LIDOCAINE HYDROCHLORIDE 20 MG/ML
INJECTION, SOLUTION EPIDURAL; INFILTRATION; INTRACAUDAL; PERINEURAL AS NEEDED
Status: DISCONTINUED | OUTPATIENT
Start: 2023-06-29 | End: 2023-06-29

## 2023-06-29 RX ORDER — PROPOFOL 10 MG/ML
INJECTION, EMULSION INTRAVENOUS CONTINUOUS PRN
Status: DISCONTINUED | OUTPATIENT
Start: 2023-06-29 | End: 2023-06-29

## 2023-06-29 RX ORDER — CEFAZOLIN SODIUM 1 G/3ML
INJECTION, POWDER, FOR SOLUTION INTRAMUSCULAR; INTRAVENOUS AS NEEDED
Status: DISCONTINUED | OUTPATIENT
Start: 2023-06-29 | End: 2023-06-29

## 2023-06-29 RX ORDER — SODIUM CHLORIDE 9 MG/ML
INJECTION, SOLUTION INTRAVENOUS CONTINUOUS PRN
Status: DISCONTINUED | OUTPATIENT
Start: 2023-06-29 | End: 2023-06-29

## 2023-06-29 RX ORDER — POTASSIUM CHLORIDE 14.9 MG/ML
20 INJECTION INTRAVENOUS
Status: COMPLETED | OUTPATIENT
Start: 2023-06-29 | End: 2023-06-29

## 2023-06-29 RX ORDER — LANOLIN ALCOHOL/MO/W.PET/CERES
3 CREAM (GRAM) TOPICAL
Status: DISCONTINUED | OUTPATIENT
Start: 2023-06-29 | End: 2023-06-30

## 2023-06-29 RX ADMIN — MELATONIN TAB 3 MG 3 MG: 3 TAB at 00:25

## 2023-06-29 RX ADMIN — PROPOFOL 40 MG: 10 INJECTION, EMULSION INTRAVENOUS at 13:23

## 2023-06-29 RX ADMIN — PANTOPRAZOLE SODIUM 40 MG: 40 INJECTION, POWDER, FOR SOLUTION INTRAVENOUS at 07:34

## 2023-06-29 RX ADMIN — PROPOFOL 150 MCG/KG/MIN: 10 INJECTION, EMULSION INTRAVENOUS at 13:20

## 2023-06-29 RX ADMIN — DICLOFENAC SODIUM 2 G: 10 GEL TOPICAL at 22:32

## 2023-06-29 RX ADMIN — PROPOFOL 50 MG: 10 INJECTION, EMULSION INTRAVENOUS at 13:20

## 2023-06-29 RX ADMIN — LIDOCAINE HYDROCHLORIDE 40 MG: 20 INJECTION, SOLUTION EPIDURAL; INFILTRATION; INTRACAUDAL; PERINEURAL at 13:20

## 2023-06-29 RX ADMIN — HEPARIN SODIUM 5000 UNITS: 5000 INJECTION INTRAVENOUS; SUBCUTANEOUS at 22:30

## 2023-06-29 RX ADMIN — CEFAZOLIN 1000 MG: 1 INJECTION, POWDER, FOR SOLUTION INTRAMUSCULAR; INTRAVENOUS at 13:20

## 2023-06-29 RX ADMIN — POTASSIUM CHLORIDE 20 MEQ: 14.9 INJECTION, SOLUTION INTRAVENOUS at 11:22

## 2023-06-29 RX ADMIN — SODIUM CHLORIDE, SODIUM GLUCONATE, SODIUM ACETATE, POTASSIUM CHLORIDE, MAGNESIUM CHLORIDE, SODIUM PHOSPHATE, DIBASIC, AND POTASSIUM PHOSPHATE 100 ML/HR: .53; .5; .37; .037; .03; .012; .00082 INJECTION, SOLUTION INTRAVENOUS at 02:49

## 2023-06-29 RX ADMIN — HYDROMORPHONE HYDROCHLORIDE 0.2 MG: 0.2 INJECTION, SOLUTION INTRAMUSCULAR; INTRAVENOUS; SUBCUTANEOUS at 07:33

## 2023-06-29 RX ADMIN — SODIUM CHLORIDE: 0.9 INJECTION, SOLUTION INTRAVENOUS at 13:04

## 2023-06-29 RX ADMIN — POTASSIUM CHLORIDE 20 MEQ: 14.9 INJECTION, SOLUTION INTRAVENOUS at 14:30

## 2023-06-29 RX ADMIN — POTASSIUM CHLORIDE 20 MEQ: 14.9 INJECTION, SOLUTION INTRAVENOUS at 09:35

## 2023-06-29 RX ADMIN — HYDROMORPHONE HYDROCHLORIDE 0.2 MG: 0.2 INJECTION, SOLUTION INTRAMUSCULAR; INTRAVENOUS; SUBCUTANEOUS at 02:45

## 2023-06-29 RX ADMIN — CEFTRIAXONE SODIUM 1000 MG: 10 INJECTION, POWDER, FOR SOLUTION INTRAVENOUS at 07:39

## 2023-06-29 NOTE — ASSESSMENT & PLAN NOTE
Malnutrition Findings:   Adult Malnutrition type:  (suspect acute and chronic components)  Adult Degree of Malnutrition: Malnutrition of moderate degree  Malnutrition Characteristics: Fat loss, Muscle loss, Inadequate energy                  360 Statement: Moderate malnutrition r/t suspect acute and chronic medical conditions contributing as evidenced by PO intake <50% of estimated needs for 5 days, mild muscle loss around clavicles, deltoids, temples; mild buccal fat pad loss. Treatment to be determined pending further workup, family discussion, etc.    BMI Findings: Body mass index is 20.79 kg/m².

## 2023-06-29 NOTE — ASSESSMENT & PLAN NOTE
· Potassium 2.7 on admission, replacement initiated during ED evaluation.   Possibly due to the nausea/vomiting and poor oral intake  · continue with replacement as needed English

## 2023-06-29 NOTE — NUTRITION
06/29/23 1745   Biochemical Data,Medical Tests, and Procedures   Biochemical Data/Medical Tests/Procedures Lab values reviewed; Meds reviewed   Speech Therapy Recommendations (Comment) 6/27 NPO   Nutrition-Focused Physical Exam   Nutrition-Focused Physical Exam Findings No edema documented;RN skin assessment reviewed;Swallowing concerns   Adequacy of Intake   Nutrition Modality NPO   Feeding Route   PO NPO   Current PO Intake   Current Diet Order NPO   Estimated Fluid Intake %  (IVF)   PES Statement   Problem Continue previous diagnosis   Recommendations/Interventions   Summary Patient is NPO for EGD today. Patient remains NPO secondary to high aspiration risk per speech recommendations. Possible PEG placement vs Hospice per chart review. Interventions/Recommendations Other (Specify); Lab consider order (Specify); Monitor labs for refeeding syndrome   Recommendations to Provider If aggressive nutrition measures desired suggest initiate continuous EN of Jevity 1.0 kcal @ 20 ml/hr and slowly advance to a goal rate of 45 ml/hr with 75 ml free water flush every 6 hrs (1145 kcal, 47 gms pro, 902 ml+300 ml free water flush= 1202 ml tv). Monitor electrolytes for refeeding syndrome and replete accordingly. Education Assessment   Education Patient/caregiver not appropriate for education at this time   Patient Nutrition Goals   Goal Status Not met; Extended   Timeframe to complete goal by next f/u   Nutrition Complexity Risk   Nutrition complexity level High risk   Follow up date 07/03/23

## 2023-06-29 NOTE — ANESTHESIA PREPROCEDURE EVALUATION
Procedure:  EGD    Relevant Problems   CARDIO   (+) RBBB      ENDO   (+) Hypothyroidism due to Hashimoto's thyroiditis      GI/HEPATIC   (+) Dysphagia      HEMATOLOGY   (+) Anemia      MUSCULOSKELETAL   (+) Acute low back pain   (+) Rheumatoid arthritis involving multiple sites with positive rheumatoid factor (HCC)      NEURO/PSYCH   (+) Anxiety   (+) Chronic pain syndrome   (+) Depression      Other   (+) Other diseases of thymus (HCC)        Physical Exam    Airway    Mallampati score: III  TM Distance: >3 FB  Neck ROM: full     Dental   Comment: Poor dentition,     Cardiovascular  Cardiovascular exam normal    Pulmonary  Pulmonary exam normal     Other Findings    Bed bound for past month  NPO for 5 days due to dysphagia  K 3 1  HCO3 19    SUMMARY     LEFT VENTRICLE:  Systolic function was normal  Ejection fraction was estimated to be 55 %  There were no regional wall motion abnormalities      MITRAL VALVE:  There was likely moderate regurgitation      TRICUSPID VALVE:  There was mild to moderate regurgitation  Pulmonary artery systolic pressure was within the normal range      PERICARDIUM:  There was no pericardial effusion  Anesthesia Plan  ASA Score- 4     Anesthesia Type- IV sedation with anesthesia with ASA Monitors  Additional Monitors:   Airway Plan:           Plan Factors-Exercise tolerance (METS): <4 METS  Chart reviewed  Patient summary reviewed  Patient is not a current smoker  Induction- intravenous  Postoperative Plan-     Informed Consent- Anesthetic plan and risks discussed with patient

## 2023-06-29 NOTE — PROGRESS NOTES
[address] [place] [address]  Progress Note  Name: Alyssa Estrada I  MRN: [id number]  Unit/Bed#: [facility] 926-01 I Date of Admission: 6/25/2023   Date of Service: 6/29/2023 I Hospital Day: 4    Assessment/Plan   Moderate protein-calorie malnutrition ([address])  Assessment & Plan  Malnutrition Findings:   Adult Malnutrition type:  (suspect acute and chronic components)  Adult Degree of Malnutrition: Malnutrition of moderate degree  Malnutrition Characteristics: Fat loss, Muscle loss, Inadequate energy                  360 Statement: Moderate malnutrition r/t suspect acute and chronic medical conditions contributing as evidenced by PO intake <50% of estimated needs for 5 days, mild muscle loss around clavicles, deltoids, temples; mild buccal fat pad loss. Treatment to be determined pending further workup, family discussion, etc.    BMI Findings: Body mass index is 20.79 kg/m². Bacteremia due to Escherichia coli  Assessment & Plan  · 1 out of 2 blood cultures positive for E. Coli  · Suspect in setting of acute pyelonephritis  · Continue Ceftriaxone    Hypokalemia  Assessment & Plan  · Potassium 2.7 on admission, replacement initiated during ED evaluation. Possibly due to the nausea/vomiting and poor oral intake  · continue with replacement as needed    Nausea and vomiting  Assessment & Plan  · 3 days of nausea/vomiting per patient, CT abdomen/pelvis with concerns for right-sided pyelonephritis along with distention of upper esophagus with scattered degree in esophagus for which relation to reflux with esophageal dysfunction was not excluded  · Management of the pyelonephritis as above. Start IV Protonix for the potential reflux and assess response. · Appears to have resolved    Pyelonephritis  Assessment & Plan  · Was treated for acute cystitis during recent admission, CT ab/pelvis now with findings suspicious for right pyelonephritis.   Patient without complaints of right side abdominal/flank pain and with benign examination. UA with WBCs and bacteria. Elevated procalcitonin and WBC is noted  · Received cefepime during ED evaluation, will continue with ceftriaxone which is sensitive  · Trend WC, temperature curve, hemodynamics    Protein calorie malnutrition (HCC)  Assessment & Plan  Malnutrition Findings:   Adult Malnutrition type:  (suspect acute and chronic components)  Adult Degree of Malnutrition: Malnutrition of moderate degree  Malnutrition Characteristics: Fat loss, Muscle loss, Inadequate energy                  360 Statement: Moderate malnutrition r/t suspect acute and chronic medical conditions contributing as evidenced by PO intake <50% of estimated needs for 5 days, mild muscle loss around clavicles, deltoids, temples; mild buccal fat pad loss. Treatment to be determined pending further workup, family discussion, etc.    BMI Findings: Body mass index is 20.79 kg/m². Compression fracture of L5 vertebra with routine healing  Assessment & Plan  · Again noted on CT imaging worsened from prior examination, additionally with marked compression deformity at L4.   Patient denying any increase in back pain from baseline  · We will continue to monitor exam and pain, if increases further work-up as appropriate    Dysphagia  Assessment & Plan  · Speech eval appreciated  · VBS shows no swallowing function  ·   · Strict NPO for now  · Pending endoscopy with peg tube placement today    Hyponatremia  Assessment & Plan  · Sodium 131 on admission, possibly due to poor recent oral intake and nausea/vomiting  · Improved with IVF    Chronic pain syndrome  Assessment & Plan  · Patient reporting no increase in chronic pain at this time  · PDMP reviewed, we will continue with low-dose oxycodone as needed  · IV Dilaudid while NPO  · Patient c/o bilateral heel pain  - Voltaren gel to heals as well    Rheumatoid arthritis involving multiple sites with positive rheumatoid factor [facility])  Assessment & Plan  · Home dose Plaquenil 200 mg in a.m. and 100 mg in PM, methotrexate 7.5 mg every Thursday  · On hold secondary to dysphagia    * Generalized weakness  Assessment & Plan  · Presenting with increased weakness over the past several days with generalized malaise, 3 days nausea/vomiting as well as intermittent diarrhea. Of note was recently hospitalized here also with generalized weakness and acute cystitis, appears rehab was recommended by PT/OT however patient refused this and went home with home therapies. · Possibly multifactorial in the setting of electrolyte abnormalities, pyelonephritis, bacteremia, dysphagia  · PT/OT  - patient bed bound at baseline  · Patient is pending EGD with PEG tube placement today with gastroenterology               VTE Pharmacologic Prophylaxis: VTE Score: 6 High Risk (Score >/= 5) - Pharmacological DVT Prophylaxis Ordered: heparin. Sequential Compression Devices Ordered. Patient Centered Rounds: I performed bedside rounds with nursing staff today. Discussions with Specialists or Other Care Team Provider: gi    Education and Discussions with Family / Patient: Updated  (niece) via phone. Total Time Spent on Date of Encounter in care of patient: 35 minutes This time was spent on one or more of the following: performing physical exam; counseling and coordination of care; obtaining or reviewing history; documenting in the medical record; reviewing/ordering tests, medications or procedures; communicating with other healthcare professionals and discussing with patient's family/caregivers. Current Length of Stay: 4 day(s)  Current Patient Status: Inpatient   Certification Statement: The patient will continue to require additional inpatient hospital stay due to Pending EGD and PEG placement  Discharge Plan: Anticipate discharge in 48 hrs to discharge location to be determined pending rehab evaluations.     Code Status: Level 3 - DNAR and DNI    Subjective:   Patient denies any acute complaints today however she is agreeable to PEG placement    Objective:     Vitals:   Temp (24hrs), Av.6 °F (37 °C), Min:98.1 °F (36.7 °C), Max:99.6 °F (37.6 °C)    Temp:  [98.1 °F (36.7 °C)-99.6 °F (37.6 °C)] 98.2 °F (36.8 °C)  HR:  [] 96  Resp:  [14-24] 14  BP: (143-166)/(71-90) 148/84  SpO2:  [93 %-99 %] 99 %  Body mass index is 20.79 kg/m². Input and Output Summary (last 24 hours): Intake/Output Summary (Last 24 hours) at 2023 1747  Last data filed at 2023 1348  Gross per 24 hour   Intake 2938.33 ml   Output 1400 ml   Net 1538.33 ml       Physical Exam:   Physical Exam  Vitals and nursing note reviewed. Constitutional:       General: She is not in acute distress. Appearance: She is well-developed. She is not toxic-appearing or diaphoretic. Comments: Cachectic appearance with temporal muscle wasting   HENT:      Head: Normocephalic and atraumatic. Eyes:      General: No scleral icterus. Conjunctiva/sclera: Conjunctivae normal.   Cardiovascular:      Rate and Rhythm: Normal rate and regular rhythm. Heart sounds: No murmur heard. No friction rub. No gallop. Pulmonary:      Effort: Pulmonary effort is normal. No respiratory distress. Breath sounds: Normal breath sounds. No stridor. No wheezing, rhonchi or rales. Chest:      Chest wall: No tenderness. Abdominal:      General: There is no distension. Palpations: Abdomen is soft. There is no mass. Tenderness: There is no abdominal tenderness. There is no guarding or rebound. Hernia: No hernia is present. Musculoskeletal:         General: No swelling or tenderness. Cervical back: Neck supple. Skin:     General: Skin is warm and dry. Capillary Refill: Capillary refill takes less than 2 seconds. Neurological:      Mental Status: She is alert and oriented to person, place, and time.    Psychiatric:         Mood and Affect: Mood normal.          Additional Data:     Labs:  Results from last 7 days   Lab Units 06/29/23  0518 06/26/23  0502 06/25/23  1649   WBC Thousand/uL 8.99   < > 17.46*   HEMOGLOBIN g/dL 10.7*   < > 11.3*   HEMATOCRIT % 34.6*   < > 36.7   PLATELETS Thousands/uL 303   < > 241   NEUTROS PCT %  --   --  86*   LYMPHS PCT %  --   --  3*   MONOS PCT %  --   --  10   EOS PCT %  --   --  0    < > = values in this interval not displayed. Results from last 7 days   Lab Units 06/29/23  0518 06/28/23  0512 06/27/23  0628   SODIUM mmol/L 135   < > 135   POTASSIUM mmol/L 3.1*   < > 3.5   CHLORIDE mmol/L 101   < > 103   CO2 mmol/L 19*   < > 22   BUN mg/dL 5   < > 9   CREATININE mg/dL 0.28*   < > 0.37*   ANION GAP mmol/L 15   < > 10   CALCIUM mg/dL 8.8   < > 8.8   ALBUMIN g/dL  --   --  2.2*   TOTAL BILIRUBIN mg/dL  --   --  0.29   ALK PHOS U/L  --   --  152*   ALT U/L  --   --  23   AST U/L  --   --  19   GLUCOSE RANDOM mg/dL 63*   < > 59*    < > = values in this interval not displayed. Results from last 7 days   Lab Units 06/25/23  1649   INR  1.00     Results from last 7 days   Lab Units 06/29/23  1313   POC GLUCOSE mg/dl 64*         Results from last 7 days   Lab Units 06/26/23  0434 06/25/23  1649   LACTIC ACID mmol/L  --  1.8   PROCALCITONIN ng/ml 3.47* 3.48*       Lines/Drains:  Invasive Devices     Peripheral Intravenous Line  Duration           Peripheral IV 06/26/23 Left Antecubital 3 days    Peripheral IV 06/29/23 Dorsal (posterior); Left Wrist <1 day          Drain  Duration           External Urinary Catheter 1 day    Gastrostomy/Enterostomy Percutaneous Endoscopic Gastrostomy (PEG) 20 Fr. RUQ <1 day                      Imaging: No pertinent imaging reviewed. Recent Cultures (last 7 days):   Results from last 7 days   Lab Units 06/26/23  0256 06/25/23  1649   BLOOD CULTURE   --  No Growth at 72 hrs.   Escherichia coli*   GRAM STAIN RESULT   --  Gram negative rods*   URINE CULTURE  70,000-79,000 cfu/ml  -- Last 24 Hours Medication List:   Current Facility-Administered Medications   Medication Dose Route Frequency Provider Last Rate   • acetaminophen  650 mg Rectal Q4H PRN Nettie Cox PA-C     • cefTRIAXone  1,000 mg Intravenous Q24H Quinten Buerger, MD 1,000 mg (06/29/23 0739)   • Diclofenac Sodium  2 g Topical 4x Daily Hilda Fraga PA-C     • heparin (porcine)  5,000 Units Subcutaneous [facility] [place] Saucer, DO     • HYDROmorphone  0.2 mg Intravenous Q4H PRN Hilda Fraga PA-C     • multi-electrolyte  100 mL/hr Intravenous Continuous [place] Saucer,  mL/hr (06/29/23 1430)   • ondansetron  4 mg Intravenous Q6H PRN Dionisio Saucer, DO     • pantoprazole  40 mg Intravenous Q24H [address] Hilda Fraga PA-C          Today, Patient Was Seen By: Tiny Clement DO    **Please Note: This note may have been constructed using a voice recognition system. **

## 2023-06-29 NOTE — CASE MANAGEMENT
Case Management Discharge Planning Note    Patient name Jeni Herrera  Location [facility] 926/[facility] 926-01 MRN [id number]  : 1945 Date 2023       Current Admission Date: 2023  Current Admission Diagnosis:Generalized weakness   Patient Active Problem List    Diagnosis Date Noted   • Moderate protein-calorie malnutrition ([address]) 2023   • Bacteremia due to Escherichia coli 2023   • Pyelonephritis 2023   • Nausea and vomiting 2023   • Hypokalemia 2023   • Hypomagnesemia 2023   • Generalized weakness 2023   • Acute cystitis with hematuria 2023   • Urinary retention 2023   • Immunodeficiency due to drugs ([address]) 2023   • Chronic venous hypertension (idiopathic) with ulcer of left lower extremity (CODE) ([address]) 2023   • Diabetic ulcer of left midfoot associated with type 2 diabetes mellitus, with bone involvement without evidence of necrosis ([address]) 2023   • Other diseases of thymus ([address]) 2023   • Protein calorie malnutrition ([address]) 2023   • Weight loss 2023   • Acute encephalopathy 2023   • Polypharmacy 2023   • Hypercalcemia 2023   • Compression fracture of L5 vertebra with routine healing 2023   • Acute low back pain 2023   • Effusion of right shoulder joint 2023   • Arm bruise, right, initial encounter 2023   • Concern for cerebral contusion 2023   • Dysphagia 2023   • 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   • Non-healing open wound of heel, initial encounter 10/12/2022   • Anemia 2022   • Pressure injury of left foot, stage 4 ([address])    • Hyponatremia 2022   • Venous insufficiency 2021   • Encounter for annual wellness exam in Medicare patient 2021   • S/P reverse total shoulder arthroplasty, left 2021   • Rupture long head biceps tendon, left, initial encounter 2021   • Shoulder dislocation 2021 • Depression 09/09/2020   • Other forms of systemic lupus erythematosus ([address]) 08/31/2020   • Vitamin D deficiency 07/24/2019   • Chronic pain syndrome 04/18/2019   • Rheumatoid arthritis involving multiple sites with positive rheumatoid factor ([address]) 03/04/2019   • Ambulatory dysfunction 11/13/2018   • Closed compression fracture of L3 lumbar vertebra 11/13/2018   • S/P total hip arthroplasty 11/27/2017   • Anxiety 08/30/2017   • Urinary tract infection without hematuria 08/30/2017   • RLS (restless legs syndrome) 08/30/2017   • RBBB 08/23/2017   • Age-related osteoporosis with current pathological fracture with routine healing 04/18/2017   • Hypothyroidism due to Hashimoto's thyroiditis 12/13/2016      LOS (days): 4  Geometric Mean LOS (GMLOS) (days): 2.80  Days to GMLOS:-0.9     OBJECTIVE:  Risk of Unplanned Readmission Score: 33.24         Current admission status: Inpatient   Preferred Pharmacy:   [address] [place] [address], [address] [place] [address]  Phone: [phone] Fax: [address], [address] [place] [address]  Phone: [phone] Fax: [phone]    Primary Care Provider: Wallace Baldwin MD    Primary Insurance: MEDICARE  Secondary Insurance:     DISCHARGE DETAILS:           Additional Comments: Per chart review, pt to have EGD today. CM to follow.

## 2023-06-29 NOTE — ANESTHESIA POSTPROCEDURE EVALUATION
Post-Op Assessment Note    CV Status:  Stable    Pain management: adequate     Mental Status:  Sleepy   Hydration Status:  Euvolemic   PONV Controlled:  Controlled   Airway Patency:  Patent      Post Op Vitals Reviewed: Yes      Staff: CRNA, Anesthesiologist         No notable events documented      /74 (06/29/23 1357)    Temp 99 6 °F (37 6 °C) (06/29/23 1357)    Pulse (!) 109 (06/29/23 1357)   Resp (!) 24 (06/29/23 1357)    SpO2 99 % (06/29/23 1357)

## 2023-06-29 NOTE — ASSESSMENT & PLAN NOTE
· Speech eval appreciated  · VBS shows no swallowing function  ·   · Strict NPO for now  · Pending endoscopy with peg tube placement today

## 2023-06-29 NOTE — ASSESSMENT & PLAN NOTE
· Presenting with increased weakness over the past several days with generalized malaise, 3 days nausea/vomiting as well as intermittent diarrhea. Of note was recently hospitalized here also with generalized weakness and acute cystitis, appears rehab was recommended by PT/OT however patient refused this and went home with home therapies.   · Possibly multifactorial in the setting of electrolyte abnormalities, pyelonephritis, bacteremia, dysphagia  · PT/OT  - patient bed bound at baseline  · Patient is pending EGD with PEG tube placement today with gastroenterology

## 2023-06-30 LAB
ALBUMIN SERPL BCP-MCNC: 2.3 G/DL (ref 3.5–5)
ALP SERPL-CCNC: 135 U/L (ref 46–116)
ALT SERPL W P-5'-P-CCNC: 20 U/L (ref 12–78)
ANION GAP SERPL CALCULATED.3IONS-SCNC: 7 MMOL/L
ANISOCYTOSIS BLD QL SMEAR: PRESENT
AST SERPL W P-5'-P-CCNC: 16 U/L (ref 5–45)
BACTERIA BLD CULT: NORMAL
BASOPHILS # BLD MANUAL: 0 THOUSAND/UL (ref 0–0.1)
BASOPHILS NFR MAR MANUAL: 0 % (ref 0–1)
BILIRUB SERPL-MCNC: 0.38 MG/DL (ref 0.2–1)
BUN SERPL-MCNC: 3 MG/DL (ref 5–25)
CALCIUM ALBUM COR SERPL-MCNC: 10 MG/DL (ref 8.3–10.1)
CALCIUM SERPL-MCNC: 8.6 MG/DL (ref 8.3–10.1)
CHLORIDE SERPL-SCNC: 97 MMOL/L (ref 96–108)
CO2 SERPL-SCNC: 25 MMOL/L (ref 21–32)
CREAT SERPL-MCNC: 0.3 MG/DL (ref 0.6–1.3)
EOSINOPHIL # BLD MANUAL: 0 THOUSAND/UL (ref 0–0.4)
EOSINOPHIL NFR BLD MANUAL: 0 % (ref 0–6)
ERYTHROCYTE [DISTWIDTH] IN BLOOD BY AUTOMATED COUNT: 15.8 % (ref 11.6–15.1)
GFR SERPL CREATININE-BSD FRML MDRD: 109 ML/MIN/1.73SQ M
GLUCOSE SERPL-MCNC: 95 MG/DL (ref 65–140)
HCT VFR BLD AUTO: 36.1 % (ref 34.8–46.1)
HGB BLD-MCNC: 11.4 G/DL (ref 11.5–15.4)
LYMPHOCYTES # BLD AUTO: 0.47 THOUSAND/UL (ref 0.6–4.47)
LYMPHOCYTES # BLD AUTO: 5 % (ref 14–44)
MAGNESIUM SERPL-MCNC: 1.6 MG/DL (ref 1.6–2.6)
MCH RBC QN AUTO: 25.9 PG (ref 26.8–34.3)
MCHC RBC AUTO-ENTMCNC: 31.6 G/DL (ref 31.4–37.4)
MCV RBC AUTO: 82 FL (ref 82–98)
METAMYELOCYTES NFR BLD MANUAL: 1 % (ref 0–1)
MONOCYTES # BLD AUTO: 0.75 THOUSAND/UL (ref 0–1.22)
MONOCYTES NFR BLD: 8 % (ref 4–12)
NEUTROPHILS # BLD MANUAL: 8.06 THOUSAND/UL (ref 1.85–7.62)
NEUTS BAND NFR BLD MANUAL: 1 % (ref 0–8)
NEUTS SEG NFR BLD AUTO: 85 % (ref 43–75)
PHOSPHATE SERPL-MCNC: 2.9 MG/DL (ref 2.3–4.1)
PLATELET # BLD AUTO: 384 THOUSANDS/UL (ref 149–390)
PLATELET BLD QL SMEAR: ADEQUATE
PMV BLD AUTO: 8.8 FL (ref 8.9–12.7)
POTASSIUM SERPL-SCNC: 3.3 MMOL/L (ref 3.5–5.3)
PROT SERPL-MCNC: 6.9 G/DL (ref 6.4–8.4)
RBC # BLD AUTO: 4.41 MILLION/UL (ref 3.81–5.12)
RBC MORPH BLD: PRESENT
SODIUM SERPL-SCNC: 129 MMOL/L (ref 135–147)
WBC # BLD AUTO: 9.37 THOUSAND/UL (ref 4.31–10.16)

## 2023-06-30 PROCEDURE — 83735 ASSAY OF MAGNESIUM: CPT | Performed by: INTERNAL MEDICINE

## 2023-06-30 PROCEDURE — 85027 COMPLETE CBC AUTOMATED: CPT | Performed by: INTERNAL MEDICINE

## 2023-06-30 PROCEDURE — 84100 ASSAY OF PHOSPHORUS: CPT | Performed by: INTERNAL MEDICINE

## 2023-06-30 PROCEDURE — 99232 SBSQ HOSP IP/OBS MODERATE 35: CPT | Performed by: INTERNAL MEDICINE

## 2023-06-30 PROCEDURE — 85007 BL SMEAR W/DIFF WBC COUNT: CPT | Performed by: INTERNAL MEDICINE

## 2023-06-30 PROCEDURE — C9113 INJ PANTOPRAZOLE SODIUM, VIA: HCPCS | Performed by: PHYSICIAN ASSISTANT

## 2023-06-30 PROCEDURE — 80053 COMPREHEN METABOLIC PANEL: CPT | Performed by: INTERNAL MEDICINE

## 2023-06-30 RX ORDER — HYDROXYCHLOROQUINE SULFATE 200 MG/1
200 TABLET, FILM COATED ORAL SEE ADMIN INSTRUCTIONS
Status: DISCONTINUED | OUTPATIENT
Start: 2023-06-30 | End: 2023-06-30

## 2023-06-30 RX ORDER — LEVOTHYROXINE SODIUM 0.07 MG/1
75 TABLET ORAL
Status: DISCONTINUED | OUTPATIENT
Start: 2023-06-30 | End: 2023-07-08 | Stop reason: HOSPADM

## 2023-06-30 RX ORDER — HYDROXYCHLOROQUINE SULFATE 200 MG/1
200 TABLET, FILM COATED ORAL
Status: DISCONTINUED | OUTPATIENT
Start: 2023-06-30 | End: 2023-07-08 | Stop reason: HOSPADM

## 2023-06-30 RX ORDER — LANOLIN ALCOHOL/MO/W.PET/CERES
3 CREAM (GRAM) TOPICAL
Status: DISCONTINUED | OUTPATIENT
Start: 2023-06-30 | End: 2023-07-08 | Stop reason: HOSPADM

## 2023-06-30 RX ORDER — FOLIC ACID 1 MG/1
2 TABLET ORAL DAILY
Status: DISCONTINUED | OUTPATIENT
Start: 2023-06-30 | End: 2023-07-08 | Stop reason: HOSPADM

## 2023-06-30 RX ORDER — ROPINIROLE 2 MG/1
2 TABLET, FILM COATED ORAL
Status: DISCONTINUED | OUTPATIENT
Start: 2023-06-30 | End: 2023-07-08 | Stop reason: HOSPADM

## 2023-06-30 RX ORDER — NORTRIPTYLINE HYDROCHLORIDE 50 MG/1
100 CAPSULE ORAL
Status: DISCONTINUED | OUTPATIENT
Start: 2023-06-30 | End: 2023-07-08 | Stop reason: HOSPADM

## 2023-06-30 RX ORDER — ACETAMINOPHEN 325 MG/1
650 TABLET ORAL EVERY 6 HOURS PRN
Status: DISCONTINUED | OUTPATIENT
Start: 2023-06-30 | End: 2023-07-01

## 2023-06-30 RX ORDER — METHOCARBAMOL 500 MG/1
500 TABLET, FILM COATED ORAL DAILY PRN
Status: DISCONTINUED | OUTPATIENT
Start: 2023-06-30 | End: 2023-07-08 | Stop reason: HOSPADM

## 2023-06-30 RX ORDER — HYDROXYCHLOROQUINE SULFATE 200 MG/1
100 TABLET, FILM COATED ORAL
Status: DISCONTINUED | OUTPATIENT
Start: 2023-06-30 | End: 2023-07-08 | Stop reason: HOSPADM

## 2023-06-30 RX ORDER — GABAPENTIN 100 MG/1
200 CAPSULE ORAL 3 TIMES DAILY
Status: DISCONTINUED | OUTPATIENT
Start: 2023-06-30 | End: 2023-07-08 | Stop reason: HOSPADM

## 2023-06-30 RX ADMIN — GABAPENTIN 200 MG: 100 CAPSULE ORAL at 22:58

## 2023-06-30 RX ADMIN — SODIUM CHLORIDE, SODIUM GLUCONATE, SODIUM ACETATE, POTASSIUM CHLORIDE, MAGNESIUM CHLORIDE, SODIUM PHOSPHATE, DIBASIC, AND POTASSIUM PHOSPHATE 100 ML/HR: .53; .5; .37; .037; .03; .012; .00082 INJECTION, SOLUTION INTRAVENOUS at 06:03

## 2023-06-30 RX ADMIN — DICLOFENAC SODIUM 2 G: 10 GEL TOPICAL at 17:40

## 2023-06-30 RX ADMIN — LEVOTHYROXINE SODIUM 75 MCG: 75 TABLET ORAL at 13:26

## 2023-06-30 RX ADMIN — NORTRIPTYLINE HYDROCHLORIDE 100 MG: 50 CAPSULE ORAL at 22:58

## 2023-06-30 RX ADMIN — DICLOFENAC SODIUM 2 G: 10 GEL TOPICAL at 09:55

## 2023-06-30 RX ADMIN — HEPARIN SODIUM 5000 UNITS: 5000 INJECTION INTRAVENOUS; SUBCUTANEOUS at 05:15

## 2023-06-30 RX ADMIN — PANTOPRAZOLE SODIUM 40 MG: 40 INJECTION, POWDER, FOR SOLUTION INTRAVENOUS at 09:54

## 2023-06-30 RX ADMIN — GABAPENTIN 200 MG: 100 CAPSULE ORAL at 17:40

## 2023-06-30 RX ADMIN — HEPARIN SODIUM 5000 UNITS: 5000 INJECTION INTRAVENOUS; SUBCUTANEOUS at 13:26

## 2023-06-30 RX ADMIN — MELATONIN TAB 3 MG 3 MG: 3 TAB at 22:58

## 2023-06-30 RX ADMIN — HEPARIN SODIUM 5000 UNITS: 5000 INJECTION INTRAVENOUS; SUBCUTANEOUS at 22:58

## 2023-06-30 RX ADMIN — DICLOFENAC SODIUM 2 G: 10 GEL TOPICAL at 13:27

## 2023-06-30 RX ADMIN — HYDROXYCHLOROQUINE SULFATE 100 MG: 200 TABLET ORAL at 17:40

## 2023-06-30 RX ADMIN — ROPINIROLE 2 MG: 2 TABLET, FILM COATED ORAL at 22:58

## 2023-06-30 RX ADMIN — DICLOFENAC SODIUM 2 G: 10 GEL TOPICAL at 22:58

## 2023-06-30 RX ADMIN — FOLIC ACID 2 MG: 1 TABLET ORAL at 13:26

## 2023-06-30 RX ADMIN — ACETAMINOPHEN 650 MG: 325 TABLET ORAL at 23:53

## 2023-06-30 RX ADMIN — CEFTRIAXONE SODIUM 1000 MG: 10 INJECTION, POWDER, FOR SOLUTION INTRAVENOUS at 09:55

## 2023-06-30 RX ADMIN — HYDROMORPHONE HYDROCHLORIDE 0.2 MG: 0.2 INJECTION, SOLUTION INTRAMUSCULAR; INTRAVENOUS; SUBCUTANEOUS at 00:30

## 2023-06-30 NOTE — ASSESSMENT & PLAN NOTE
· Home dose Plaquenil 200 mg in a.m. and 100 mg in PM, methotrexate 7.5 mg every Thursday  · Continue home medications

## 2023-06-30 NOTE — PROGRESS NOTES
[address] [place] [address]  Progress Note  Name: Nickie Sutherland I  MRN: [id number]  Unit/Bed#: [facility]26-01 I Date of Admission: 6/25/2023   Date of Service: 6/30/2023 I Hospital Day: 5    Assessment/Plan   Moderate protein-calorie malnutrition ([address])  Assessment & Plan  Malnutrition Findings:   Adult Malnutrition type:  (suspect acute and chronic components)  Adult Degree of Malnutrition: Malnutrition of moderate degree  Malnutrition Characteristics: Fat loss, Muscle loss, Inadequate energy                  360 Statement: Moderate malnutrition r/t suspect acute and chronic medical conditions contributing as evidenced by PO intake <50% of estimated needs for 5 days, mild muscle loss around clavicles, deltoids, temples; mild buccal fat pad loss. Treatment to be determined pending further workup, family discussion, etc.    BMI Findings: Body mass index is 20.79 kg/m². Bacteremia due to Escherichia coli  Assessment & Plan  · 1 out of 2 blood cultures positive for E. Coli  · Suspect in setting of acute pyelonephritis  · Continue Ceftriaxone    Hypokalemia  Assessment & Plan  · Potassium 2.7 on admission, replacement initiated during ED evaluation. Possibly due to the nausea/vomiting and poor oral intake  · continue with replacement as needed    Nausea and vomiting  Assessment & Plan  · 3 days of nausea/vomiting per patient, CT abdomen/pelvis with concerns for right-sided pyelonephritis along with distention of upper esophagus with scattered degree in esophagus for which relation to reflux with esophageal dysfunction was not excluded  · Management of the pyelonephritis as above. Start IV Protonix for the potential reflux and assess response. · Appears to have resolved    Pyelonephritis  Assessment & Plan  · Was treated for acute cystitis during recent admission, CT ab/pelvis now with findings suspicious for right pyelonephritis.   Patient without complaints of right side abdominal/flank pain and with benign examination. UA with WBCs and bacteria. Elevated procalcitonin and WBC is noted  · Received cefepime during ED evaluation, will continue with ceftriaxone which is sensitive  · Trend WC, temperature curve, hemodynamics    Protein calorie malnutrition (HCC)  Assessment & Plan  Malnutrition Findings:   Adult Malnutrition type:  (suspect acute and chronic components)  Adult Degree of Malnutrition: Malnutrition of moderate degree  Malnutrition Characteristics: Fat loss, Muscle loss, Inadequate energy                  360 Statement: Moderate malnutrition r/t suspect acute and chronic medical conditions contributing as evidenced by PO intake <50% of estimated needs for 5 days, mild muscle loss around clavicles, deltoids, temples; mild buccal fat pad loss. Treatment to be determined pending further workup, family discussion, etc.    BMI Findings: Body mass index is 20.79 kg/m². Compression fracture of L5 vertebra with routine healing  Assessment & Plan  · Again noted on CT imaging worsened from prior examination, additionally with marked compression deformity at L4. Patient denying any increase in back pain from baseline  · We will continue to monitor exam and pain, if increases further work-up as appropriate    Dysphagia  Assessment & Plan  · Speech eval appreciated  · VBS shows no swallowing function  ·   · Status post PEG tube placement we will start feedings today and continue medications through PEG tube    Hyponatremia  Assessment & Plan  · Sodium 131 on admission, possibly due to poor recent oral intake and nausea/vomiting  · Improved with IVF    Chronic pain syndrome  Assessment & Plan  · Patient reporting no increase in chronic pain at this time  · PDMP reviewed, we will continue with low-dose oxycodone as needed  ·     Rheumatoid arthritis involving multiple sites with positive rheumatoid factor (HCC)  Assessment & Plan  · Home dose Plaquenil 200 mg in a.m. and 100 mg in PM, methotrexate 7.5 mg every Thursday  · Continue home medications    * Generalized weakness  Assessment & Plan  · Presenting with increased weakness over the past several days with generalized malaise, 3 days nausea/vomiting as well as intermittent diarrhea. Of note was recently hospitalized here also with generalized weakness and acute cystitis, appears rehab was recommended by PT/OT however patient refused this and went home with home therapies. · Possibly multifactorial in the setting of electrolyte abnormalities, pyelonephritis, bacteremia, dysphagia  · PT/OT  - patient bed bound at baseline  · Status post EGD and PEG tube placement with GI, nutrition consult appreciated               VTE Pharmacologic Prophylaxis: VTE Score: 6 High Risk (Score >/= 5) - Pharmacological DVT Prophylaxis Ordered: heparin. Sequential Compression Devices Ordered. Patient Centered Rounds: I performed bedside rounds with nursing staff today. Discussions with Specialists or Other Care Team Provider: gi    Education and Discussions with Family / Patient: niece. Total Time Spent on Date of Encounter in care of patient: 35 minutes This time was spent on one or more of the following: performing physical exam; counseling and coordination of care; obtaining or reviewing history; documenting in the medical record; reviewing/ordering tests, medications or procedures; communicating with other healthcare professionals and discussing with patient's family/caregivers. Current Length of Stay: 5 day(s)  Current Patient Status: Inpatient   Certification Statement: The patient will continue to require additional inpatient hospital stay due to Initiation of tube feeds  Discharge Plan: Anticipate discharge in 24-48 hrs to home with home services.     Code Status: Level 3 - DNAR and DNI    Subjective:   Patient reports pain improved around PEG tube, denies any nausea vomiting abdominal pain dizziness lightheadedness chest pain shortness of breath    Objective:     Vitals:   Temp (24hrs), Av.9 °F (37.2 °C), Min:98.7 °F (37.1 °C), Max:99.2 °F (37.3 °C)    Temp:  [98.7 °F (37.1 °C)-99.2 °F (37.3 °C)] 99.2 °F (37.3 °C)  HR:  [] 100  Resp:  [16-19] 19  BP: (168-176)/() 176/100  SpO2:  [97 %-100 %] 97 %  Body mass index is 20.79 kg/m². Input and Output Summary (last 24 hours): Intake/Output Summary (Last 24 hours) at 2023 1841  Last data filed at 2023 1746  Gross per 24 hour   Intake 964.46 ml   Output 1100 ml   Net -135.54 ml       Physical Exam:   Physical Exam  Vitals and nursing note reviewed. Constitutional:       General: She is not in acute distress. Appearance: She is well-developed. She is not toxic-appearing or diaphoretic. Comments: Cachectic appearance with temporal muscle wasting   HENT:      Head: Normocephalic and atraumatic. Eyes:      General: No scleral icterus. Conjunctiva/sclera: Conjunctivae normal.   Cardiovascular:      Rate and Rhythm: Normal rate and regular rhythm. Heart sounds: No murmur heard. No friction rub. No gallop. Pulmonary:      Effort: Pulmonary effort is normal. No respiratory distress. Breath sounds: Normal breath sounds. No stridor. No wheezing, rhonchi or rales. Chest:      Chest wall: No tenderness. Abdominal:      General: There is no distension. Palpations: Abdomen is soft. There is no mass. Tenderness: There is no abdominal tenderness. There is no guarding or rebound. Hernia: No hernia is present. Musculoskeletal:         General: No swelling or tenderness. Cervical back: Neck supple. Skin:     General: Skin is warm and dry. Capillary Refill: Capillary refill takes less than 2 seconds. Neurological:      Mental Status: She is alert and oriented to person, place, and time.    Psychiatric:         Mood and Affect: Mood normal.          Additional Data:     Labs:  Results from last 7 days   Lab Units 06/30/23  0716 06/26/23  0502 06/25/23  1649   WBC Thousand/uL 9.37   < > 17.46*   HEMOGLOBIN g/dL 11.4*   < > 11.3*   HEMATOCRIT % 36.1   < > 36.7   PLATELETS Thousands/uL 384   < > 241   BANDS PCT % 1  --   --    NEUTROS PCT %  --   --  86*   LYMPHS PCT %  --   --  3*   LYMPHO PCT % 5*  --   --    MONOS PCT %  --   --  10   MONO PCT % 8  --   --    EOS PCT % 0  --  0    < > = values in this interval not displayed. Results from last 7 days   Lab Units 06/30/23  0716   SODIUM mmol/L 129*   POTASSIUM mmol/L 3.3*   CHLORIDE mmol/L 97   CO2 mmol/L 25   BUN mg/dL 3*   CREATININE mg/dL 0.30*   ANION GAP mmol/L 7   CALCIUM mg/dL 8.6   ALBUMIN g/dL 2.3*   TOTAL BILIRUBIN mg/dL 0.38   ALK PHOS U/L 135*   ALT U/L 20   AST U/L 16   GLUCOSE RANDOM mg/dL 95     Results from last 7 days   Lab Units 06/25/23  1649   INR  1.00     Results from last 7 days   Lab Units 06/29/23  1313   POC GLUCOSE mg/dl 64*         Results from last 7 days   Lab Units 06/26/23  0434 06/25/23  1649   LACTIC ACID mmol/L  --  1.8   PROCALCITONIN ng/ml 3.47* 3.48*       Lines/Drains:  Invasive Devices     Peripheral Intravenous Line  Duration           Peripheral IV 06/30/23 Right;Ventral (anterior) Forearm <1 day          Drain  Duration           External Urinary Catheter 2 days    Gastrostomy/Enterostomy Percutaneous Endoscopic Gastrostomy (PEG) 20 Fr. RUQ 1 day                      Imaging: No pertinent imaging reviewed. Recent Cultures (last 7 days):   Results from last 7 days   Lab Units 06/26/23  0256 06/25/23  1649   BLOOD CULTURE   --  No Growth After 4 Days.   Escherichia coli*   GRAM STAIN RESULT   --  Gram negative rods*   URINE CULTURE  70,000-79,000 cfu/ml  --        Last 24 Hours Medication List:   Current Facility-Administered Medications   Medication Dose Route Frequency Provider Last Rate   • acetaminophen  650 mg Rectal Q4H PRN Eden Cage PA-C     • cefTRIAXone  1,000 mg Intravenous Q24H Yuki Ovalle MD 1,000 mg (06/30/23 9892)   • Diclofenac Sodium  2 g Topical 4x Daily Ramon Ng PA-C     • folic acid  2 mg Per PEG Tube Daily Bassem Santillan, DO     • gabapentin  200 mg Per PEG Tube TID Bassem Santillan, DO     • heparin (porcine)  5,000 Units Subcutaneous [facility] Ada Most, DO     • HYDROmorphone  0.2 mg Intravenous Q4H PRN Ramon Ng PA-C     • hydroxychloroquine  200 mg Per PEG Tube Daily With Breakfast Bassem Santillan, DO      And   • hydroxychloroquine  100 mg Per PEG Tube Daily With [employer], DO     • levothyroxine  75 mcg Per PEG Tube Early Morning Bassem Santillan, DO     • melatonin  3 mg Per PEG Tube HS Bassem Santillan, DO     • methocarbamol  500 mg Per PEG Tube Daily PRN Bassem Santillan DO     • nortriptyline  100 mg Per PEG Tube HS Bassem Santillan, DO     • ondansetron  4 mg Intravenous Q6H PRN Ada Most, DO     • pantoprazole  40 mg Intravenous Q24H [address]  Hillary Roldan PA-C     • rOPINIRole  2 mg Per G Tube HS Sarah Musa DO          Today, Patient Was Seen By: Sarah Musa DO    **Please Note: This note may have been constructed using a voice recognition system. **

## 2023-06-30 NOTE — CASE MANAGEMENT
Case Management Discharge Planning Note    Patient name Erick Fox  Location [facility] MRN [id number]  : 1945 Date 2023       Current Admission Date: 2023  Current Admission Diagnosis:Generalized weakness   Patient Active Problem List    Diagnosis Date Noted   • Moderate protein-calorie malnutrition ([address]) 2023   • Bacteremia due to Escherichia coli 2023   • Pyelonephritis 2023   • Nausea and vomiting 2023   • Hypokalemia 2023   • Hypomagnesemia 2023   • Generalized weakness 2023   • Acute cystitis with hematuria 2023   • Urinary retention 2023   • Immunodeficiency due to drugs ([address]) 2023   • Chronic venous hypertension (idiopathic) with ulcer of left lower extremity (CODE) ([address]) 2023   • Diabetic ulcer of left midfoot associated with type 2 diabetes mellitus, with bone involvement without evidence of necrosis ([address]) 2023   • Other diseases of thymus ([address]) 2023   • Protein calorie malnutrition ([address]) 2023   • Weight loss 2023   • Acute encephalopathy 2023   • Polypharmacy 2023   • Hypercalcemia 2023   • Compression fracture of L5 vertebra with routine healing 2023   • Acute low back pain 2023   • Effusion of right shoulder joint 2023   • Arm bruise, right, initial encounter 2023   • Concern for cerebral contusion 2023   • Dysphagia 2023   • 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   • Non-healing open wound of heel, initial encounter 10/12/2022   • Anemia 2022   • Pressure injury of left foot, stage 4 ([address])    • Hyponatremia 2022   • Venous insufficiency 2021   • Encounter for annual wellness exam in Medicare patient 2021   • S/P reverse total shoulder arthroplasty, left 2021   • Rupture long head biceps tendon, left, initial encounter 2021   • Shoulder dislocation 2021 • Depression 09/09/2020   • Other forms of systemic lupus erythematosus ([address]) 08/31/2020   • Vitamin D deficiency 07/24/2019   • Chronic pain syndrome 04/18/2019   • Rheumatoid arthritis involving multiple sites with positive rheumatoid factor ([address]) 03/04/2019   • Ambulatory dysfunction 11/13/2018   • Closed compression fracture of L3 lumbar vertebra 11/13/2018   • S/P total hip arthroplasty 11/27/2017   • Anxiety 08/30/2017   • Urinary tract infection without hematuria 08/30/2017   • RLS (restless legs syndrome) 08/30/2017   • RBBB 08/23/2017   • Age-related osteoporosis with current pathological fracture with routine healing 04/18/2017   • Hypothyroidism due to Hashimoto's thyroiditis 12/13/2016      LOS (days): 5  Geometric Mean LOS (GMLOS) (days): 2.80  Days to GMLOS:-1.9     OBJECTIVE:  Risk of Unplanned Readmission Score: 36.08         Current admission status: Inpatient   Preferred Pharmacy:   [address] [place] [address], [address] [place] [address]  Phone: [phone] Fax: [address], [address] [place] [address]  Phone: [phone] Fax: [phone]    Primary Care Provider: James Berger MD    Primary Insurance: MEDICARE  Secondary Insurance:     DISCHARGE DETAILS:       Additional Comments: Per provider, pt to begin tube feeds today. CM to follow for recs.

## 2023-06-30 NOTE — PROGRESS NOTES
Progress Note -  [facility]  Roland Justin Colon 77 y.o. female MRN: [id number]  Unit/Bed#: [facility] 926-01 Encounter: [id number]      ASSESSMENT AND PLAN:      65-year-old female with past medical history of hypothyroidism, rheumatoid arthritis who is admitted for pyelonephritis, E. coli bacteremia, generalized weakness. GI consulted for dysphagia. 1. Oropharyngeal dysphagia  2. Status post PEG tube placement  With significant decrease in p.o. intake. 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 EGD yesterday with some difficulty intubating the upper esophageal sphincter but otherwise unremarkable. PEG tube placed. This morning PEG tube appears clean and intact. Bumper was loosened from 2 to 2.5 cm and freely rotated. Flushed well. · PEG tube okay to use for medications and tube feeds. · Appreciate nutrition consult  · From GI perspective to advance diet as tolerated. Follow-up with speech therapy. · PEG tube to remain in place for at least 4 weeks before considering removal.  · Continue tube care. · Flushes with medication and feeding. Gastroenterology to sign off at this time. He is contact with any questions. Rest of care per primary team.    ______________________________________________________________________    Subjective:  Seen and examined. Has slight pain around PEG tube site but not significant. Tolerating liquid diet. Rest of ROS was negative. REVIEW OF SYSTEMS:    Review of Systems   Constitutional: Negative for chills and fever. HENT: Negative for congestion and sinus pressure. Respiratory: Negative for cough and shortness of breath. Cardiovascular: Negative for chest pain, palpitations and leg swelling. Gastrointestinal: Positive for abdominal pain. Negative for diarrhea, nausea and vomiting. Genitourinary: Negative for dysuria and hematuria. Musculoskeletal: Negative for arthralgias and back pain. Skin: Negative for color change and rash. Neurological: Negative for dizziness and headaches. Psychiatric/Behavioral: Negative for agitation and confusion. All other systems reviewed and are negative. Historical Information   Past Medical History:   Diagnosis Date   • Acute blood loss anemia 9/9/2020   • Aftercare following joint replacement 9/9/2020    Patient had right reversed total shoulder arthroplasty.  Pain was controlled when he left the hospital.     • Anxiety    • Arthritis    • Cellulitis of left lower extremity 11/30/2019   • Depression    • Disease of thyroid gland     HYPO   • Dyspepsia 11/12/2019   • Dysphagia 2/6/2023   • Femur neck fracture (HCC)    • GERD (gastroesophageal reflux disease)    • Hyperthyroidism     RESOLVED: [id number]   • Hyponatremia 7/25/2022   • Leg pain 2/6/2023   • Osteoporosis    • Polio    • PVD (peripheral vascular disease) ([address])    • Right BBB/left ant fasc block    • Shoulder pain, bilateral 7/27/2021   • UTI (urinary tract infection)    • Varicella infection     LAST ASSESSED: [id number]     Past Surgical History:   Procedure Laterality Date   • APPENDECTOMY     • HIP ARTHROPLASTY Left 11/27/2017    Procedure: REMOVAL IM NAIL CONVERSION TO TOTAL HIP ARTHROPLASTY POSTERIOR APPROACH;  Surgeon: Masha Manzano MD;  Location: [facility];  Service: Orthopedics   • HIP FRACTURE SURGERY     • HIP SURGERY      both hips replaced;2013 left ,2014 right   • JOINT REPLACEMENT Right     HIP TOTAL   • RI ARTHROPLASTY GLENOHUMERAL JOINT TOTAL SHOULDER Right 9/2/2020    Procedure: ARTHROPLASTY SHOULDER REVERSE;  Surgeon: Wilfredo Jimenez MD;  Location: [facility];  Service: Orthopedics   • RI ARTHROPLASTY GLENOHUMERAL JOINT TOTAL SHOULDER Left 6/1/2021    Procedure: ARTHROPLASTY SHOULDER REVERSE;  Surgeon: Wilfredo Jimenez MD;  Location[facility];  Service: Orthopedics   • RI CONV PREV HIP TOT HIP ARTHRP W/WO AGRFT/ALGRFT Left 10/4/2017    Procedure: Hareware removal of left hip;  Surgeon: Masha Manzano MD; Location: [facility];  Service: Orthopedics   • [place] [address] WRST SURG W/RLS TRANSVRS CARPL LIGM Right 5/24/2022    Procedure: RELEASE CARPAL TUNNEL ENDOSCOPIC-right;  Surgeon: Tariq Snow MD;  Location: [facility];  Service: Orthopedics   • REVISION TOTAL HIP ARTHROPLASTY Right    • TOE AMPUTATION Left 7/24/2022    Procedure: 5TH METATARSAL RESECTION WITH BOTTOM FLAP CLOSURE;  Surgeon: Alberto Gilmore DPM;  Location: [facility];  Service: Podiatry   • TONSILLECTOMY       Social History   Social History     Substance and Sexual Activity   Alcohol Use Not Currently     Social History     Substance and Sexual Activity   Drug Use Not Currently     Social History     Tobacco Use   Smoking Status Former   Smokeless Tobacco Never   Tobacco Comments    quit 20-30 years ago     Family History   Problem Relation Age of Onset   • Rheum arthritis Mother    • Rheum arthritis Sister    • Prostate cancer Brother        Meds/Allergies     Medications Prior to Admission   Medication   • methotrexate 2.5 MG tablet   • acetaminophen (TYLENOL) 650 mg CR tablet   • Cholecalciferol 50 MCG (0469 UT) TBDP   • folic acid (FOLVITE) 1 mg tablet   • gabapentin (NEURONTIN) 100 mg capsule   • hydroxychloroquine (PLAQUENIL) 200 mg tablet   • levothyroxine 75 mcg tablet   • lidocaine (LMX) 4 % cream   • melatonin 3 mg   • methocarbamol (ROBAXIN) 500 mg tablet   • Multiple Vitamins-Minerals (CENTRUM SILVER PO)   • nortriptyline (PAMELOR) 50 mg capsule   • oxyCODONE (ROXICODONE) 5 immediate release tablet   • polyethylene glycol (MIRALAX) 17 g packet   • rOPINIRole (REQUIP) 2 mg tablet   • senna-docusate sodium (SENOKOT S) 8.6-50 mg per tablet     Current Facility-Administered Medications   Medication Dose Route Frequency   • acetaminophen (TYLENOL) rectal suppository 650 mg  650 mg Rectal Q4H PRN   • cefTRIAXone (ROCEPHIN) 1,000 mg in dextrose 5 % 50 mL IVPB  1,000 mg Intravenous Q24H   • Diclofenac Sodium (VOLTAREN) 1 % topical gel 2 g  2 g Topical 4x Daily   • heparin (porcine) subcutaneous injection 5,000 Units  5,000 Units Subcutaneous Q8H [address]   • HYDROmorphone HCl (DILAUDID) injection 0.2 mg  0.2 mg Intravenous Q4H PRN   • melatonin tablet 3 mg  3 mg Oral HS   • multi-electrolyte (PLASMALYTE-A/ISOLYTE-S PH 7.4) IV solution  100 mL/hr Intravenous Continuous   • ondansetron (ZOFRAN) injection 4 mg  4 mg Intravenous Q6H PRN   • pantoprazole (PROTONIX) injection 40 mg  40 mg Intravenous Q24H JENIFER       No Known Allergies        Objective     Blood pressure 168/90, pulse 96, temperature 98.7 °F (37.1 °C), resp. rate 16, height 4' 9.01" (1.448 m), weight 43.6 kg (96 lb 1.9 oz), SpO2 99 %, not currently breastfeeding. Body mass index is 20.79 kg/m². Intake/Output Summary (Last 24 hours) at 6/30/2023 0703  Last data filed at 6/30/2023 0201  Gross per 24 hour   Intake 758.33 ml   Output 500 ml   Net 258.33 ml         PHYSICAL EXAM:      Physical Exam  Vitals and nursing note reviewed. Constitutional:       General: She is not in acute distress. Appearance: Normal appearance. She is ill-appearing. HENT:      Head: Normocephalic and atraumatic. Mouth/Throat:      Mouth: Mucous membranes are moist.   Eyes:      Extraocular Movements: Extraocular movements intact. Conjunctiva/sclera: Conjunctivae normal.   Cardiovascular:      Pulses: Normal pulses. Pulmonary:      Effort: Pulmonary effort is normal.   Abdominal:      General: Abdomen is flat. Bowel sounds are normal. There is no distension. Palpations: Abdomen is soft. Tenderness: There is abdominal tenderness. There is no guarding. Comments: PEG tube bumper loosened from 2 to 2.5 cm. Fairly rotated. Skin:     General: Skin is warm and dry. Neurological:      General: No focal deficit present. Mental Status: She is alert and oriented to person, place, and time.    Psychiatric:         Mood and Affect: Mood normal.         Behavior: Behavior normal.          Lab Results:   Admission on 06/25/2023   Component Date Value   • Ventricular Rate 06/25/2023 96    • Atrial Rate 06/25/2023 96    • KS Interval 06/25/2023 138    • QRSD Interval 06/25/2023 132    • QT Interval 06/25/2023 380    • QTC Interval 06/25/2023 480    • P Axis 06/25/2023 71    • QRS Axis 06/25/2023 84    • T Wave Axis 06/25/2023 71    • WBC 06/25/2023 17.46 (H)    • RBC 06/25/2023 4.28    • Hemoglobin 06/25/2023 11.3 (L)    • Hematocrit 06/25/2023 36.7    • MCV 06/25/2023 86    • MCH 06/25/2023 26.4 (L)    • MCHC 06/25/2023 30.8 (L)    • RDW 06/25/2023 15.9 (H)    • MPV 06/25/2023 9.1    • Platelets 82/49/4980 241    • nRBC 06/25/2023 0    • Neutrophils Relative 06/25/2023 86 (H)    • Immat GRANS % 06/25/2023 1    • Lymphocytes Relative 06/25/2023 3 (L)    • Monocytes Relative 06/25/2023 10    • Eosinophils Relative 06/25/2023 0    • Basophils Relative 06/25/2023 0    • Neutrophils Absolute 06/25/2023 15.18 (H)    • Immature Grans Absolute 06/25/2023 0.17    • Lymphocytes Absolute 06/25/2023 0.43 (L)    • Monocytes Absolute 06/25/2023 1.66 (H)    • Eosinophils Absolute 06/25/2023 0.00    • Basophils Absolute 06/25/2023 0.02    • Sodium 06/25/2023 131 (L)    • Potassium 06/25/2023 2.7 (LL)    • Chloride 06/25/2023 102    • CO2 06/25/2023 23    • ANION GAP 06/25/2023 6    • BUN 06/25/2023 15    • Creatinine 06/25/2023 0.48 (L)    • Glucose 06/25/2023 117    • Calcium 06/25/2023 8.8    • Corrected Calcium 06/25/2023 10.0    • AST 06/25/2023 19    • ALT 06/25/2023 24    • Alkaline Phosphatase 06/25/2023 130 (H)    • Total Protein 06/25/2023 7.2    • Albumin 06/25/2023 2.5 (L)    • Total Bilirubin 06/25/2023 0.47    • eGFR 06/25/2023 93    • LACTIC ACID 06/25/2023 1.8    • Procalcitonin 06/25/2023 3.48 (H)    • Protime 06/25/2023 13.4    • INR 06/25/2023 1.00    • PTT 06/25/2023 25    • Blood Culture 06/25/2023 Escherichia coli (A)    • Gram Stain Result 06/25/2023 Gram negative rods (A)    • Blood Culture 06/25/2023 No Growth After 4 Days.     • Color, UA 06/26/2023 Yellow    • Clarity, UA 06/26/2023 Turbid    • Specific Gravity, UA 06/26/2023 >=1.050 (H)    • pH, UA 06/26/2023 6.5    • Leukocytes, UA 06/26/2023 Large (A)    • Nitrite, UA 06/26/2023 Positive (A)    • Protein, UA 06/26/2023 100 (2+) (A)    • Glucose, UA 06/26/2023 Negative    • Ketones, UA 06/26/2023 Negative    • Urobilinogen, UA 06/26/2023 <2.0    • Bilirubin, UA 06/26/2023 Negative    • Occult Blood, UA 06/26/2023 Moderate (A)    • Lipase 06/25/2023 35 (L)    • hs TnI 0hr 06/25/2023 28    • hs TnI 2hr 06/25/2023 21    • Delta 2hr hsTnI 06/25/2023 -7    • hs TnI 4hr 06/25/2023 24    • Delta 4hr hsTnI 06/25/2023 -4    • RBC, UA 06/26/2023 Innumerable (A)    • WBC, UA 06/26/2023 Innumerable (A)    • Epithelial Cells 06/26/2023 Moderate (A)    • Bacteria, UA 06/26/2023 Moderate (A)    • WBC Clumps 06/26/2023 Present    • Urine Culture 06/26/2023 70,000-79,000 cfu/ml    • Procalcitonin 06/26/2023 3.47 (H)    • Sodium 06/26/2023 134 (L)    • Potassium 06/26/2023 3.9    • Chloride 06/26/2023 108    • CO2 06/26/2023 23    • ANION GAP 06/26/2023 3    • BUN 06/26/2023 14    • Creatinine 06/26/2023 0.48 (L)    • Glucose 06/26/2023 100    • Calcium 06/26/2023 8.4    • eGFR 06/26/2023 93    • WBC 06/26/2023 15.07 (H)    • RBC 06/26/2023 3.63 (L)    • Hemoglobin 06/26/2023 9.9 (L)    • Hematocrit 06/26/2023 30.9 (L)    • MCV 06/26/2023 85    • MCH 06/26/2023 27.3    • MCHC 06/26/2023 32.0    • RDW 06/26/2023 15.8 (H)    • Platelets 76/39/2547 209    • MPV 06/26/2023 9.0    • Escherichia coli 06/25/2023 Detected (A)    • Sodium 06/27/2023 135    • Potassium 06/27/2023 3.5    • Chloride 06/27/2023 103    • CO2 06/27/2023 22    • ANION GAP 06/27/2023 10    • BUN 06/27/2023 9    • Creatinine 06/27/2023 0.37 (L)    • Glucose 06/27/2023 59 (L)    • Calcium 06/27/2023 8.8    • Corrected Calcium 06/27/2023 10.2 (H)    • AST 06/27/2023 19    • ALT 06/27/2023 23    • Alkaline Phosphatase 06/27/2023 152 (H)    • Total Protein 06/27/2023 6.8    • Albumin 06/27/2023 2.2 (L)    • Total Bilirubin 06/27/2023 0.29    • eGFR 06/27/2023 101    • WBC 06/27/2023 13.43 (H)    • RBC 06/27/2023 4.44    • Hemoglobin 06/27/2023 11.8    • Hematocrit 06/27/2023 39.0    • MCV 06/27/2023 88    • MCH 06/27/2023 26.6 (L)    • MCHC 06/27/2023 30.3 (L)    • RDW 06/27/2023 16.1 (H)    • Platelets 85/81/3849 252    • MPV 06/27/2023 9.4    • Sodium 06/28/2023 137    • Potassium 06/28/2023 2.7 (LL)    • Chloride 06/28/2023 103    • CO2 06/28/2023 24    • ANION GAP 06/28/2023 10    • BUN 06/28/2023 8    • Creatinine 06/28/2023 0.32 (L)    • Glucose 06/28/2023 60 (L)    • Calcium 06/28/2023 8.4    • eGFR 06/28/2023 106    • WBC 06/28/2023 9.75    • RBC 06/28/2023 3.80 (L)    • Hemoglobin 06/28/2023 10.2 (L)    • Hematocrit 06/28/2023 31.6 (L)    • MCV 06/28/2023 83    • MCH 06/28/2023 26.8    • MCHC 06/28/2023 32.3    • RDW 06/28/2023 16.0 (H)    • Platelets 20/09/6727 262    • MPV 06/28/2023 9.2    • SARS-CoV-2 06/28/2023 Negative    • Sodium 06/29/2023 135    • Potassium 06/29/2023 3.1 (L)    • Chloride 06/29/2023 101    • CO2 06/29/2023 19 (L)    • ANION GAP 06/29/2023 15    • BUN 06/29/2023 5    • Creatinine 06/29/2023 0.28 (L)    • Glucose 06/29/2023 63 (L)    • Calcium 06/29/2023 8.8    • eGFR 06/29/2023 111    • WBC 06/29/2023 8.99    • RBC 06/29/2023 4.06    • Hemoglobin 06/29/2023 10.7 (L)    • Hematocrit 06/29/2023 34.6 (L)    • MCV 06/29/2023 85    • MCH 06/29/2023 26.4 (L)    • MCHC 06/29/2023 30.9 (L)    • RDW 06/29/2023 15.9 (H)    • Platelets 04/77/0307 303    • MPV 06/29/2023 8.8 (L)    • POC Glucose 06/29/2023 64 (L)        Imaging Studies: I have personally reviewed pertinent imaging studies. [facility] MELISSAOTeto   Gastroenterology Fellow  PGY-4  Available via [employer]  6/30/2023 7:03 AM\

## 2023-06-30 NOTE — ASSESSMENT & PLAN NOTE
· Patient reporting no increase in chronic pain at this time  · PDMP reviewed, we will continue with low-dose oxycodone as needed  ·

## 2023-06-30 NOTE — ASSESSMENT & PLAN NOTE
· Speech eval appreciated  · VBS shows no swallowing function  ·   · Status post PEG tube placement we will start feedings today and continue medications through PEG tube

## 2023-06-30 NOTE — PLAN OF CARE
Problem: Potential for Falls  Goal: Patient will remain free of falls  Description: INTERVENTIONS:  - Educate patient/family on patient safety including physical limitations  - Instruct patient to call for assistance with activity   - Consult OT/PT to assist with strengthening/mobility   - Keep Call bell within reach  - Keep bed low and locked with side rails adjusted as appropriate  - Keep care items and personal belongings within reach  - Initiate and maintain comfort rounds  - Make Fall Risk Sign visible to staff  - Apply yellow socks and bracelet for high fall risk patients  - Consider moving patient to room near nurses station  Outcome: Progressing     Problem: MOBILITY - ADULT  Goal: Maintain or return to baseline ADL function  Description: INTERVENTIONS:  -  Assess patient's ability to carry out ADLs; assess patient's baseline for ADL function and identify physical deficits which impact ability to perform ADLs (bathing, care of mouth/teeth, toileting, grooming, dressing, etc.)  - Assess/evaluate cause of self-care deficits   - Assess range of motion  - Assess patient's mobility; develop plan if impaired  - Assess patient's need for assistive devices and provide as appropriate  - Encourage maximum independence but intervene and supervise when necessary  - Involve family in performance of ADLs  - Assess for home care needs following discharge   - Consider OT consult to assist with ADL evaluation and planning for discharge  - Provide patient education as appropriate  Outcome: Progressing  Goal: Maintains/Returns to pre admission functional level  Description: INTERVENTIONS:  - Perform BMAT or MOVE assessment daily.   - Set and communicate daily mobility goal to care team and patient/family/caregiver.    - Collaborate with rehabilitation services on mobility goals if consulted  - Record patient progress and toleration of activity level   Outcome: Progressing     Problem: Prexisting or High Potential for Compromised Skin Integrity  Goal: Skin integrity is maintained or improved  Description: INTERVENTIONS:  - Identify patients at risk for skin breakdown  - Assess and monitor skin integrity  - Assess and monitor nutrition and hydration status  - Monitor labs   - Assess for incontinence   - Turn and reposition patient  - Assist with mobility/ambulation  - Relieve pressure over bony prominences  - Avoid friction and shearing  - Provide appropriate hygiene as needed including keeping skin clean and dry  - Evaluate need for skin moisturizer/barrier cream  - Collaborate with interdisciplinary team   - Patient/family teaching  - Consider wound care consult   Outcome: Progressing     Problem: Nutrition/Hydration-ADULT  Goal: Nutrient/Hydration intake appropriate for improving, restoring or maintaining nutritional needs  Description: Monitor and assess patient's nutrition/hydration status for malnutrition. Collaborate with interdisciplinary team and initiate plan and interventions as ordered. Monitor patient's weight and dietary intake as ordered or per policy. Utilize nutrition screening tool and intervene as necessary. Determine patient's food preferences and provide high-protein, high-caloric foods as appropriate.      INTERVENTIONS:  - Monitor oral intake, urinary output, labs, and treatment plans  - Assess nutrition and hydration status and recommend course of action  - Evaluate amount of meals eaten  - Assist patient with eating if necessary   - Allow adequate time for meals  - Recommend/ encourage appropriate diets, oral nutritional supplements, and vitamin/mineral supplements  - Order, calculate, and assess calorie counts as needed  - Recommend, monitor, and adjust tube feedings and TPN/PPN based on assessed needs  - Assess need for intravenous fluids  - Provide specific nutrition/hydration education as appropriate  - Include patient/family/caregiver in decisions related to nutrition  Outcome: Progressing Problem: PAIN - ADULT  Goal: Verbalizes/displays adequate comfort level or baseline comfort level  Description: Interventions:  - Encourage patient to monitor pain and request assistance  - Assess pain using appropriate pain scale  - Administer analgesics based on type and severity of pain and evaluate response  - Implement non-pharmacological measures as appropriate and evaluate response  - Consider cultural and social influences on pain and pain management  - Notify physician/advanced practitioner if interventions unsuccessful or patient reports new pain  Outcome: Progressing     Problem: INFECTION - ADULT  Goal: Absence or prevention of progression during hospitalization  Description: INTERVENTIONS:  - Assess and monitor for signs and symptoms of infection  - Monitor lab/diagnostic results  - Monitor all insertion sites, i.e. indwelling lines, tubes, and drains  - Monitor endotracheal if appropriate and nasal secretions for changes in amount and color  - [place] appropriate cooling/warming therapies per order  - Administer medications as ordered  - Instruct and encourage patient and family to use good hand hygiene technique  - Identify and instruct in appropriate isolation precautions for identified infection/condition  Outcome: Progressing  Goal: Absence of fever/infection during neutropenic period  Description: INTERVENTIONS:  - Monitor WBC    Outcome: Progressing     Problem: DISCHARGE PLANNING  Goal: Discharge to home or other facility with appropriate resources  Description: INTERVENTIONS:  - Identify barriers to discharge w/patient and caregiver  - Arrange for needed discharge resources and transportation as appropriate  - Identify discharge learning needs (meds, wound care, etc.)  - Arrange for interpretive services to assist at discharge as needed  - Refer to Case Management Department for coordinating discharge planning if the patient needs post-hospital services based on physician/advanced practitioner order or complex needs related to functional status, cognitive ability, or social support system  Outcome: Progressing     Problem: Knowledge Deficit  Goal: Patient/family/caregiver demonstrates understanding of disease process, treatment plan, medications, and discharge instructions  Description: Complete learning assessment and assess knowledge base.   Interventions:  - Provide teaching at level of understanding  - Provide teaching via preferred learning methods  Outcome: Progressing

## 2023-06-30 NOTE — ASSESSMENT & PLAN NOTE
· Presenting with increased weakness over the past several days with generalized malaise, 3 days nausea/vomiting as well as intermittent diarrhea. Of note was recently hospitalized here also with generalized weakness and acute cystitis, appears rehab was recommended by PT/OT however patient refused this and went home with home therapies.   · Possibly multifactorial in the setting of electrolyte abnormalities, pyelonephritis, bacteremia, dysphagia  · PT/OT  - patient bed bound at baseline  · Status post EGD and PEG tube placement with GI, nutrition consult appreciated

## 2023-06-30 NOTE — PLAN OF CARE
Problem: Potential for Falls  Goal: Patient will remain free of falls  Description: INTERVENTIONS:  - Educate patient/family on patient safety including physical limitations  - Instruct patient to call for assistance with activity   - Consult OT/PT to assist with strengthening/mobility   - Keep Call bell within reach  - Keep bed low and locked with side rails adjusted as appropriate  - Keep care items and personal belongings within reach  - Initiate and maintain comfort rounds  - Make Fall Risk Sign visible to staff  - Apply yellow socks and bracelet for high fall risk patients  - Consider moving patient to room near nurses station  Outcome: Progressing     Problem: MOBILITY - ADULT  Goal: Maintain or return to baseline ADL function  Description: INTERVENTIONS:  -  Assess patient's ability to carry out ADLs; assess patient's baseline for ADL function and identify physical deficits which impact ability to perform ADLs (bathing, care of mouth/teeth, toileting, grooming, dressing, etc.)  - Assess/evaluate cause of self-care deficits   - Assess range of motion  - Assess patient's mobility; develop plan if impaired  - Assess patient's need for assistive devices and provide as appropriate  - Encourage maximum independence but intervene and supervise when necessary  - Involve family in performance of ADLs  - Assess for home care needs following discharge   - Consider OT consult to assist with ADL evaluation and planning for discharge  - Provide patient education as appropriate  Outcome: Progressing  Goal: Maintains/Returns to pre admission functional level  Description: INTERVENTIONS:  - Perform BMAT or MOVE assessment daily.   - Set and communicate daily mobility goal to care team and patient/family/caregiver.    - Collaborate with rehabilitation services on mobility goals if consulted  - Out of bed for toileting  - Record patient progress and toleration of activity level   Outcome: Progressing     Problem: Prexisting or High Potential for Compromised Skin Integrity  Goal: Skin integrity is maintained or improved  Description: INTERVENTIONS:  - Identify patients at risk for skin breakdown  - Assess and monitor skin integrity  - Assess and monitor nutrition and hydration status  - Monitor labs   - Assess for incontinence   - Turn and reposition patient  - Assist with mobility/ambulation  - Relieve pressure over bony prominences  - Avoid friction and shearing  - Provide appropriate hygiene as needed including keeping skin clean and dry  - Evaluate need for skin moisturizer/barrier cream  - Collaborate with interdisciplinary team   - Patient/family teaching  - Consider wound care consult   Outcome: Progressing     Problem: Nutrition/Hydration-ADULT  Goal: Nutrient/Hydration intake appropriate for improving, restoring or maintaining nutritional needs  Description: Monitor and assess patient's nutrition/hydration status for malnutrition. Collaborate with interdisciplinary team and initiate plan and interventions as ordered. Monitor patient's weight and dietary intake as ordered or per policy. Utilize nutrition screening tool and intervene as necessary. Determine patient's food preferences and provide high-protein, high-caloric foods as appropriate.      INTERVENTIONS:  - Monitor oral intake, urinary output, labs, and treatment plans  - Assess nutrition and hydration status and recommend course of action  - Evaluate amount of meals eaten  - Assist patient with eating if necessary   - Allow adequate time for meals  - Recommend/ encourage appropriate diets, oral nutritional supplements, and vitamin/mineral supplements  - Order, calculate, and assess calorie counts as needed  - Recommend, monitor, and adjust tube feedings and TPN/PPN based on assessed needs  - Assess need for intravenous fluids  - Provide specific nutrition/hydration education as appropriate  - Include patient/family/caregiver in decisions related to nutrition  Outcome: Progressing

## 2023-06-30 NOTE — ASSESSMENT & PLAN NOTE
· Sodium 131 on admission, possibly due to poor recent oral intake and nausea/vomiting  · Improved with IVF 93

## 2023-07-01 LAB
ALBUMIN SERPL BCP-MCNC: 2.1 G/DL (ref 3.5–5)
ALP SERPL-CCNC: 124 U/L (ref 46–116)
ALT SERPL W P-5'-P-CCNC: 20 U/L (ref 12–78)
ANION GAP SERPL CALCULATED.3IONS-SCNC: 4 MMOL/L
AST SERPL W P-5'-P-CCNC: 14 U/L (ref 5–45)
BILIRUB SERPL-MCNC: 0.22 MG/DL (ref 0.2–1)
BUN SERPL-MCNC: 7 MG/DL (ref 5–25)
CALCIUM ALBUM COR SERPL-MCNC: 10.2 MG/DL (ref 8.3–10.1)
CALCIUM SERPL-MCNC: 8.7 MG/DL (ref 8.3–10.1)
CHLORIDE SERPL-SCNC: 98 MMOL/L (ref 96–108)
CO2 SERPL-SCNC: 30 MMOL/L (ref 21–32)
CREAT SERPL-MCNC: 0.48 MG/DL (ref 0.6–1.3)
ERYTHROCYTE [DISTWIDTH] IN BLOOD BY AUTOMATED COUNT: 15.7 % (ref 11.6–15.1)
GFR SERPL CREATININE-BSD FRML MDRD: 93 ML/MIN/1.73SQ M
GLUCOSE SERPL-MCNC: 124 MG/DL (ref 65–140)
HCT VFR BLD AUTO: 33.8 % (ref 34.8–46.1)
HGB BLD-MCNC: 11 G/DL (ref 11.5–15.4)
MCH RBC QN AUTO: 26.7 PG (ref 26.8–34.3)
MCHC RBC AUTO-ENTMCNC: 32.5 G/DL (ref 31.4–37.4)
MCV RBC AUTO: 82 FL (ref 82–98)
PLATELET # BLD AUTO: 408 THOUSANDS/UL (ref 149–390)
PMV BLD AUTO: 8.8 FL (ref 8.9–12.7)
POTASSIUM SERPL-SCNC: 2.7 MMOL/L (ref 3.5–5.3)
PROT SERPL-MCNC: 6.5 G/DL (ref 6.4–8.4)
RBC # BLD AUTO: 4.12 MILLION/UL (ref 3.81–5.12)
SODIUM SERPL-SCNC: 132 MMOL/L (ref 135–147)
WBC # BLD AUTO: 10.56 THOUSAND/UL (ref 4.31–10.16)

## 2023-07-01 PROCEDURE — C9113 INJ PANTOPRAZOLE SODIUM, VIA: HCPCS | Performed by: PHYSICIAN ASSISTANT

## 2023-07-01 PROCEDURE — 85027 COMPLETE CBC AUTOMATED: CPT | Performed by: INTERNAL MEDICINE

## 2023-07-01 PROCEDURE — 99223 1ST HOSP IP/OBS HIGH 75: CPT | Performed by: PHYSICIAN ASSISTANT

## 2023-07-01 PROCEDURE — 80053 COMPREHEN METABOLIC PANEL: CPT | Performed by: INTERNAL MEDICINE

## 2023-07-01 PROCEDURE — 99232 SBSQ HOSP IP/OBS MODERATE 35: CPT | Performed by: INTERNAL MEDICINE

## 2023-07-01 RX ORDER — POTASSIUM CHLORIDE 20MEQ/15ML
40 LIQUID (ML) ORAL ONCE
Status: COMPLETED | OUTPATIENT
Start: 2023-07-01 | End: 2023-07-01

## 2023-07-01 RX ORDER — LANOLIN ALCOHOL/MO/W.PET/CERES
400 CREAM (GRAM) TOPICAL 2 TIMES DAILY
Status: COMPLETED | OUTPATIENT
Start: 2023-07-01 | End: 2023-07-02

## 2023-07-01 RX ORDER — POTASSIUM CHLORIDE 14.9 MG/ML
20 INJECTION INTRAVENOUS ONCE
Status: COMPLETED | OUTPATIENT
Start: 2023-07-01 | End: 2023-07-01

## 2023-07-01 RX ADMIN — MAGNESIUM OXIDE TAB 400 MG (241.3 MG ELEMENTAL MG) 400 MG: 400 (241.3 MG) TAB at 11:33

## 2023-07-01 RX ADMIN — DICLOFENAC SODIUM 2 G: 10 GEL TOPICAL at 08:30

## 2023-07-01 RX ADMIN — FOLIC ACID 2 MG: 1 TABLET ORAL at 08:30

## 2023-07-01 RX ADMIN — GABAPENTIN 200 MG: 100 CAPSULE ORAL at 23:11

## 2023-07-01 RX ADMIN — POTASSIUM CHLORIDE 40 MEQ: 1.5 SOLUTION ORAL at 17:45

## 2023-07-01 RX ADMIN — POTASSIUM CHLORIDE 40 MEQ: 1.5 SOLUTION ORAL at 11:33

## 2023-07-01 RX ADMIN — MAGNESIUM OXIDE TAB 400 MG (241.3 MG ELEMENTAL MG) 400 MG: 400 (241.3 MG) TAB at 17:45

## 2023-07-01 RX ADMIN — HYDROXYCHLOROQUINE SULFATE 200 MG: 200 TABLET ORAL at 08:30

## 2023-07-01 RX ADMIN — POTASSIUM CHLORIDE 20 MEQ: 14.9 INJECTION, SOLUTION INTRAVENOUS at 09:35

## 2023-07-01 RX ADMIN — HEPARIN SODIUM 5000 UNITS: 5000 INJECTION INTRAVENOUS; SUBCUTANEOUS at 06:03

## 2023-07-01 RX ADMIN — HEPARIN SODIUM 5000 UNITS: 5000 INJECTION INTRAVENOUS; SUBCUTANEOUS at 23:11

## 2023-07-01 RX ADMIN — LEVOTHYROXINE SODIUM 75 MCG: 75 TABLET ORAL at 06:03

## 2023-07-01 RX ADMIN — POTASSIUM PHOSPHATE, MONOBASIC AND POTASSIUM PHOSPHATE, DIBASIC 12 MMOL: 224; 236 INJECTION, SOLUTION, CONCENTRATE INTRAVENOUS at 11:33

## 2023-07-01 RX ADMIN — POTASSIUM CHLORIDE 40 MEQ: 1.5 SOLUTION ORAL at 09:35

## 2023-07-01 RX ADMIN — GABAPENTIN 200 MG: 100 CAPSULE ORAL at 08:30

## 2023-07-01 RX ADMIN — HEPARIN SODIUM 5000 UNITS: 5000 INJECTION INTRAVENOUS; SUBCUTANEOUS at 13:51

## 2023-07-01 RX ADMIN — CEFTRIAXONE SODIUM 1000 MG: 10 INJECTION, POWDER, FOR SOLUTION INTRAVENOUS at 08:41

## 2023-07-01 RX ADMIN — HYDROXYCHLOROQUINE SULFATE 100 MG: 200 TABLET ORAL at 17:46

## 2023-07-01 RX ADMIN — PANTOPRAZOLE SODIUM 40 MG: 40 INJECTION, POWDER, FOR SOLUTION INTRAVENOUS at 08:30

## 2023-07-01 RX ADMIN — NORTRIPTYLINE HYDROCHLORIDE 100 MG: 50 CAPSULE ORAL at 23:11

## 2023-07-01 RX ADMIN — GABAPENTIN 200 MG: 100 CAPSULE ORAL at 17:45

## 2023-07-01 RX ADMIN — DICLOFENAC SODIUM 2 G: 10 GEL TOPICAL at 11:34

## 2023-07-01 RX ADMIN — ROPINIROLE 2 MG: 2 TABLET, FILM COATED ORAL at 23:11

## 2023-07-01 RX ADMIN — MELATONIN TAB 3 MG 3 MG: 3 TAB at 23:11

## 2023-07-01 NOTE — PLAN OF CARE
Problem: Potential for Falls  Goal: Patient will remain free of falls  Description: INTERVENTIONS:  - Educate patient/family on patient safety including physical limitations  - Instruct patient to call for assistance with activity   - Consult OT/PT to assist with strengthening/mobility   - Keep Call bell within reach  - Keep bed low and locked with side rails adjusted as appropriate  - Keep care items and personal belongings within reach  - Initiate and maintain comfort rounds  - Make Fall Risk Sign visible to staff  - Apply yellow socks and bracelet for high fall risk patients  - Consider moving patient to room near nurses station  Outcome: Progressing     Problem: MOBILITY - ADULT  Goal: Maintain or return to baseline ADL function  Description: INTERVENTIONS:  -  Assess patient's ability to carry out ADLs; assess patient's baseline for ADL function and identify physical deficits which impact ability to perform ADLs (bathing, care of mouth/teeth, toileting, grooming, dressing, etc.)  - Assess/evaluate cause of self-care deficits   - Assess range of motion  - Assess patient's mobility; develop plan if impaired  - Assess patient's need for assistive devices and provide as appropriate  - Encourage maximum independence but intervene and supervise when necessary  - Involve family in performance of ADLs  - Assess for home care needs following discharge   - Consider OT consult to assist with ADL evaluation and planning for discharge  - Provide patient education as appropriate  Outcome: Progressing  Goal: Maintains/Returns to pre admission functional level  Description: INTERVENTIONS:  - Perform BMAT or MOVE assessment daily.   - Set and communicate daily mobility goal to care team and patient/family/caregiver.    - Collaborate with rehabilitation services on mobility goals if consulted  - Record patient progress and toleration of activity level   Outcome: Progressing     Problem: Prexisting or High Potential for Compromised Skin Integrity  Goal: Skin integrity is maintained or improved  Description: INTERVENTIONS:  - Identify patients at risk for skin breakdown  - Assess and monitor skin integrity  - Assess and monitor nutrition and hydration status  - Monitor labs   - Assess for incontinence   - Turn and reposition patient  - Assist with mobility/ambulation  - Relieve pressure over bony prominences  - Avoid friction and shearing  - Provide appropriate hygiene as needed including keeping skin clean and dry  - Evaluate need for skin moisturizer/barrier cream  - Collaborate with interdisciplinary team   - Patient/family teaching  - Consider wound care consult   Outcome: Progressing     Problem: Nutrition/Hydration-ADULT  Goal: Nutrient/Hydration intake appropriate for improving, restoring or maintaining nutritional needs  Description: Monitor and assess patient's nutrition/hydration status for malnutrition. Collaborate with interdisciplinary team and initiate plan and interventions as ordered. Monitor patient's weight and dietary intake as ordered or per policy. Utilize nutrition screening tool and intervene as necessary. Determine patient's food preferences and provide high-protein, high-caloric foods as appropriate.      INTERVENTIONS:  - Monitor oral intake, urinary output, labs, and treatment plans  - Assess nutrition and hydration status and recommend course of action  - Evaluate amount of meals eaten  - Assist patient with eating if necessary   - Allow adequate time for meals  - Recommend/ encourage appropriate diets, oral nutritional supplements, and vitamin/mineral supplements  - Order, calculate, and assess calorie counts as needed  - Recommend, monitor, and adjust tube feedings and TPN/PPN based on assessed needs  - Assess need for intravenous fluids  - Provide specific nutrition/hydration education as appropriate  - Include patient/family/caregiver in decisions related to nutrition  Outcome: Progressing Problem: PAIN - ADULT  Goal: Verbalizes/displays adequate comfort level or baseline comfort level  Description: Interventions:  - Encourage patient to monitor pain and request assistance  - Assess pain using appropriate pain scale  - Administer analgesics based on type and severity of pain and evaluate response  - Implement non-pharmacological measures as appropriate and evaluate response  - Consider cultural and social influences on pain and pain management  - Notify physician/advanced practitioner if interventions unsuccessful or patient reports new pain  Outcome: Progressing     Problem: INFECTION - ADULT  Goal: Absence or prevention of progression during hospitalization  Description: INTERVENTIONS:  - Assess and monitor for signs and symptoms of infection  - Monitor lab/diagnostic results  - Monitor all insertion sites, i.e. indwelling lines, tubes, and drains  - Monitor endotracheal if appropriate and nasal secretions for changes in amount and color  - [place] appropriate cooling/warming therapies per order  - Administer medications as ordered  - Instruct and encourage patient and family to use good hand hygiene technique  - Identify and instruct in appropriate isolation precautions for identified infection/condition  Outcome: Progressing  Goal: Absence of fever/infection during neutropenic period  Description: INTERVENTIONS:  - Monitor WBC    Outcome: Progressing     Problem: DISCHARGE PLANNING  Goal: Discharge to home or other facility with appropriate resources  Description: INTERVENTIONS:  - Identify barriers to discharge w/patient and caregiver  - Arrange for needed discharge resources and transportation as appropriate  - Identify discharge learning needs (meds, wound care, etc.)  - Arrange for interpretive services to assist at discharge as needed  - Refer to Case Management Department for coordinating discharge planning if the patient needs post-hospital services based on physician/advanced practitioner order or complex needs related to functional status, cognitive ability, or social support system  Outcome: Progressing     Problem: Knowledge Deficit  Goal: Patient/family/caregiver demonstrates understanding of disease process, treatment plan, medications, and discharge instructions  Description: Complete learning assessment and assess knowledge base.   Interventions:  - Provide teaching at level of understanding  - Provide teaching via preferred learning methods  Outcome: Progressing

## 2023-07-01 NOTE — ASSESSMENT & PLAN NOTE
· Speech eval appreciated  · VBS shows no swallowing function  ·   · Status post PEG tube placement -tube feeds currently at goal patient is tolerating

## 2023-07-01 NOTE — CONSULTS
Consult Note - Vascular Surgery   Geetha Colon 77 y.o. female MRN: [id number]    Unit/Bed#: [facility] 926-01 Encounter: [id number]    Assessment:  77year old female with h/o venous stasis ulcers, left fifth metatarsal resection, E. 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 Plan:  LEADs in AM, will determine necessary intervention if any. Continue NV checks. Consulting Service: SLIM    Chief Complaint: LLE cooler to touch than right    HPI: Demetris Cárdenas is a 77 y.o. female who presents with sepsis d/t e. Coli bacteremia, s/p PEG placement d/t cricopharyngeal bar causing severe dysphagia. We were contacted by the primary team due to a report from nursing staff that the patient's left foot was cooler to the touch than the right foot. It is reported that patient does have doppler signals. Upon exam, patient is poor historian and does not answer questions except with yes or no. It is reported patient is bedbound and has a niece who is her primary contact. Review of Systems:  General: negative for - fever  Cardiovascular: no chest pain or dyspnea on exertion  Respiratory: no cough, shortness of breath, or wheezing  Gastrointestinal: pain at PEG per charting  Genitourinary ROS: positive for - incontinence  Musculoskeletal ROS: negative  Neurological ROS: no TIA or stroke symptoms  Hematological and Lymphatic ROS: negative  Dermatological ROS: negative  Psychological ROS: negative  Ophthalmic ROS: negative  ENT ROS: negative    Past Medical History:  Past Medical History:   Diagnosis Date   • Acute blood loss anemia 9/9/2020   • Aftercare following joint replacement 9/9/2020    Patient had right reversed total shoulder arthroplasty.  Pain was controlled when he left the hospital.     • Anxiety    • Arthritis    • Cellulitis of left lower extremity 11/30/2019   • Depression    • Disease of thyroid gland     HYPO   • Dyspepsia 11/12/2019   • Dysphagia 2/6/2023   • Femur neck fracture (HCC)    • GERD (gastroesophageal reflux disease)    • Hyperthyroidism     RESOLVED: [id number]   • Hyponatremia 7/25/2022   • Leg pain 2/6/2023   • Osteoporosis    • Polio    • PVD (peripheral vascular disease) ([address])    • Right BBB/left ant fasc block    • Shoulder pain, bilateral 7/27/2021   • UTI (urinary tract infection)    • Varicella infection     LAST ASSESSED: [id number]       Past Surgical History:  Past Surgical History:   Procedure Laterality Date   • APPENDECTOMY     • HIP ARTHROPLASTY Left 11/27/2017    Procedure: REMOVAL IM NAIL CONVERSION TO TOTAL HIP ARTHROPLASTY POSTERIOR APPROACH;  Surgeon: Tracey Ashley MD;  Location: [facility];  Service: Orthopedics   • HIP FRACTURE SURGERY     • HIP SURGERY      both hips replaced;2013 left ,2014 right   • JOINT REPLACEMENT Right     HIP TOTAL   • MO ARTHROPLASTY GLENOHUMERAL JOINT TOTAL SHOULDER Right 9/2/2020    Procedure: ARTHROPLASTY SHOULDER REVERSE;  Surgeon: Christine Rose MD;  Location: [facility];  Service: Orthopedics   • MO ARTHROPLASTY GLENOHUMERAL JOINT TOTAL SHOULDER Left 6/1/2021    Procedure: ARTHROPLASTY SHOULDER REVERSE;  Surgeon: Christine Rose MD;  Location: [facility];  Service: Orthopedics   • MO CONV PREV HIP TOT HIP ARTHRP W/WO AGRFT/ALGRFT Left 10/4/2017    Procedure: [place] removal of left hip;  Surgeon: Tracey Ashley MD;  Location: [facility];  Service: Orthopedics   • MO [address] SURG W/RLS TRANSVRS CARPL LIGM Right 5/24/2022    Procedure: RELEASE CARPAL TUNNEL ENDOSCOPIC-right;  Surgeon: Jurgen Martines MD;  Location: [facility];  Service: Orthopedics   • REVISION TOTAL HIP ARTHROPLASTY Right    • TOE AMPUTATION Left 7/24/2022    Procedure: 5TH METATARSAL RESECTION WITH BOTTOM FLAP CLOSURE;  Surgeon: Elton Duran DPM;  Location: [facility];  Service: Podiatry   • TONSILLECTOMY Social History:  Social History     Substance and Sexual Activity   Alcohol Use Not Currently     Social History     Substance and Sexual Activity   Drug Use Not Currently     Social History     Tobacco Use   Smoking Status Former   Smokeless Tobacco Never   Tobacco Comments    quit 20-30 years ago       Family History:  Family History   Problem Relation Age of Onset   • Rheum arthritis Mother    • Rheum arthritis Sister    • Prostate cancer Brother        Allergies:  No Known Allergies    Medications:  Current Facility-Administered Medications   Medication Dose Route Frequency   • acetaminophen (TYLENOL) tablet 650 mg  650 mg Per PEG Tube Q6H PRN   • cefTRIAXone (ROCEPHIN) 1,000 mg in dextrose 5 % 50 mL IVPB  1,000 mg Intravenous Q24H   • Diclofenac Sodium (VOLTAREN) 1 % topical gel 2 g  2 g Topical 4x Daily   • folic acid (FOLVITE) tablet 2 mg  2 mg Per PEG Tube Daily   • gabapentin (NEURONTIN) capsule 200 mg  200 mg Per PEG Tube TID   • heparin (porcine) subcutaneous injection 5,000 Units  5,000 Units Subcutaneous Q8H [address]   • HYDROmorphone HCl (DILAUDID) injection 0.2 mg  0.2 mg Intravenous Q4H PRN   • hydroxychloroquine (PLAQUENIL) tablet 200 mg  200 mg Per PEG Tube Daily With Breakfast    And   • hydroxychloroquine (PLAQUENIL) tablet 100 mg  100 mg Per PEG Tube Daily With Dinner   • levothyroxine tablet 75 mcg  75 mcg Per PEG Tube Early Morning   • melatonin tablet 3 mg  3 mg Per PEG Tube HS   • methocarbamol (ROBAXIN) tablet 500 mg  500 mg Per PEG Tube Daily PRN   • nortriptyline (PAMELOR) capsule 100 mg  100 mg Per PEG Tube HS   • ondansetron (ZOFRAN) injection 4 mg  4 mg Intravenous Q6H PRN   • pantoprazole (PROTONIX) injection 40 mg  40 mg Intravenous Q24H JENIFER   • rOPINIRole (REQUIP) tablet 2 mg  2 mg Per G Tube HS       Vitals:  /100   Pulse 92   Temp 97.6 °F (36.4 °C)   Resp 19   Ht 4' 9.01" (1.448 m)   Wt 43.5 kg (95 lb 14.4 oz)   SpO2 98%   BMI 20.75 kg/m²     I/Os:  I/O last 24 hours: In: 964.5 [P.O.:180; I.V.:784.5]  Out: 1350 [Urine:1350]    Lab Results and Cultures:   Lab Results   Component Value Date    WBC 9.37 06/30/2023    HGB 11.4 (L) 06/30/2023    HCT 36.1 06/30/2023    MCV 82 06/30/2023     06/30/2023     Lab Results   Component Value Date    GLUCOSE 89 10/30/2014    CALCIUM 8.6 06/30/2023     (L) 10/30/2014    K 3.3 (L) 06/30/2023    CO2 25 06/30/2023    CL 97 06/30/2023    BUN 3 (L) 06/30/2023    CREATININE 0.30 (L) 06/30/2023     Lab Results   Component Value Date    INR 1.00 06/25/2023    INR 1.04 06/01/2023    INR 0.84 04/13/2023    PROTIME 13.4 06/25/2023    PROTIME 13.8 06/01/2023    PROTIME 11.7 04/13/2023       Lipid Panel: No results found for: "CHOL",     Blood Culture:   Lab Results   Component Value Date    BLOODCX Escherichia coli (A) 06/25/2023    BLOODCX No Growth After 5 Days.  06/25/2023   ,   Urinalysis:   Lab Results   Component Value Date    COLORU Yellow 06/26/2023    COLORU Yellow 10/15/2014    CLARITYU Turbid 06/26/2023    CLARITYU Turbid 10/15/2014    SPECGRAV >=1.050 (H) 06/26/2023    SPECGRAV 1.007 10/15/2014    PHUR 6.5 06/26/2023    PHUR >=9.0 (H) 11/30/2019    PHUR 8.5 (H) 10/15/2014    LEUKOCYTESUR Large (A) 06/26/2023    LEUKOCYTESUR Negative 10/15/2014    NITRITE Positive (A) 06/26/2023    NITRITE Negative 10/15/2014    PROTEINUA Negative 10/15/2014    GLUCOSEU Negative 06/26/2023    GLUCOSEU Negative 10/15/2014    KETONESU Negative 06/26/2023    KETONESU Negative 10/15/2014    BILIRUBINUR Negative 06/26/2023    BILIRUBINUR Negative 10/15/2014    BLOODU Moderate (A) 06/26/2023    BLOODU Negative 10/15/2014   ,   Urine Culture:   Lab Results   Component Value Date    URINECX 70,000-79,000 cfu/ml 06/26/2023   ,   Wound Culure:   Lab Results   Component Value Date    WOUNDCULT 2+ Growth of Staphylococcus aureus (A) 07/21/2022    WOUNDCULT 1+ Growth of Pseudomonas aeruginosa (A) 07/21/2022    WOUNDCULT (A) 07/21/2022     1+ Growth of - Acinetobacter pittii  / Acinetobacter species       Imagin/15/2022 LEADS:  CONCLUSION:  Impression:  RIGHT LOWER LIMB:  Diffuse disease noted throughout the femoral-popliteal arteries without  significant focal stenosis. Ankle/Brachial index: 0.92  which is in the normal category (Prior 1.08). PVR/ PPG tracings are dampened. Metatarsal pressure of 85 mm Hg  Great toe pressure of 58 mm Hg, within the healing range. LEFT LOWER LIMB:  Diffuse disease noted throughout the femoral-popliteal arteries without  significant focal stenosis. Ankle/Brachial index: 1.0 which is in the normal category (Prior 1.09)  PVR/ PPG tracings are dampened. Metatarsal pressure of 151 mm Hg  Great toe pressure of 61 mm Hg, within the healing range. Tech note: technically difficult study. LORENA's/PVR may be unreliable due to  significant patient movement. Hyperemic waveforms noted throughout the b/l lower extremities. Compared to previous study on 2015, there is no significant interval  change noted. SIGNATURE:  Electronically Signed by: Serena Bianchi MD [facility] on 2022-07-15 10:08:35 PM    Physical Exam:    General appearance: asleep, difficult to arouse  Skin: skin pink, with brisk cap refill.  Plantar surface cool to the touch  Neurologic: oriented but lethargic, weak motor sensation bilaterally  Head: Normocephalic, without obvious abnormality, atraumatic  Eyes: EOMI  Lungs: No resp distress  Heart: No edema  Abdomen: soft, NT  Extremities: motor intact    Wound/Incision:  none     Pulse exam:  Femoral: Right: 1+ Left: 1+  DP: Right: doppler signal Left: 1+  PT: Right: doppler signal Left: doppler signal      Franck Fuentes PA-C  2023

## 2023-07-01 NOTE — PLAN OF CARE
Problem: Knowledge Deficit  Goal: Patient/family/caregiver demonstrates understanding of disease process, treatment plan, medications, and discharge instructions  Description: Complete learning assessment and assess knowledge base. Interventions:  - Provide teaching at level of understanding  - Provide teaching via preferred learning methods  Outcome: Progressing     Problem: Nutrition/Hydration-ADULT  Goal: Nutrient/Hydration intake appropriate for improving, restoring or maintaining nutritional needs  Description: Monitor and assess patient's nutrition/hydration status for malnutrition. Collaborate with interdisciplinary team and initiate plan and interventions as ordered. Monitor patient's weight and dietary intake as ordered or per policy. Utilize nutrition screening tool and intervene as necessary. Determine patient's food preferences and provide high-protein, high-caloric foods as appropriate.      INTERVENTIONS:  - Monitor oral intake, urinary output, labs, and treatment plans  - Assess nutrition and hydration status and recommend course of action  - Evaluate amount of meals eaten  - Assist patient with eating if necessary   - Allow adequate time for meals  - Recommend/ encourage appropriate diets, oral nutritional supplements, and vitamin/mineral supplements  - Order, calculate, and assess calorie counts as needed  - Recommend, monitor, and adjust tube feedings and TPN/PPN based on assessed needs  - Assess need for intravenous fluids  - Provide specific nutrition/hydration education as appropriate  - Include patient/family/caregiver in decisions related to nutrition  Outcome: Progressing

## 2023-07-01 NOTE — PROGRESS NOTES
[address] [facility]  Progress Note  Name: Gricelda Choi I  MRN: [id number]  Unit/Bed#: [facility]PHP 926-01 I Date of Admission: 6/25/2023   Date of Service: 7/1/2023 I Hospital Day: 6    Assessment/Plan   Moderate protein-calorie malnutrition ([address])  Assessment & Plan  Malnutrition Findings:   Adult Malnutrition type:  (suspect acute and chronic components)  Adult Degree of Malnutrition: Malnutrition of moderate degree  Malnutrition Characteristics: Fat loss, Muscle loss, Inadequate energy                  360 Statement: Moderate malnutrition r/t suspect acute and chronic medical conditions contributing as evidenced by PO intake <50% of estimated needs for 5 days, mild muscle loss around clavicles, deltoids, temples; mild buccal fat pad loss. Treatment to be determined pending further workup, family discussion, etc.    BMI Findings: Body mass index is 20.75 kg/m². Bacteremia due to Escherichia coli  Assessment & Plan  · 1 out of 2 blood cultures positive for E. Coli  · Suspect in setting of acute pyelonephritis  · Continue Ceftriaxone    Hypokalemia  Assessment & Plan  · Potassium 2.7 on admission, replacement initiated during ED evaluation. Possibly due to the nausea/vomiting and poor oral intake  · Potassium 2.7 again this morning, will supplement potassium and magnesium aggressively    Nausea and vomiting  Assessment & Plan  · 3 days of nausea/vomiting per patient, CT abdomen/pelvis with concerns for right-sided pyelonephritis along with distention of upper esophagus with scattered degree in esophagus for which relation to reflux with esophageal dysfunction was not excluded  · Management of the pyelonephritis as above. Start IV Protonix for the potential reflux and assess response. · Appears to have resolved    Pyelonephritis  Assessment & Plan  · Was treated for acute cystitis during recent admission, CT ab/pelvis now with findings suspicious for right pyelonephritis. Patient without complaints of right side abdominal/flank pain and with benign examination. UA with WBCs and bacteria. Elevated procalcitonin and WBC is noted  · Received cefepime during ED evaluation, will continue with ceftriaxone which is sensitive  · Trend WC, temperature curve, hemodynamics    Protein calorie malnutrition (HCC)  Assessment & Plan  Malnutrition Findings:   Adult Malnutrition type:  (suspect acute and chronic components)  Adult Degree of Malnutrition: Malnutrition of moderate degree  Malnutrition Characteristics: Fat loss, Muscle loss, Inadequate energy                  360 Statement: Moderate malnutrition r/t suspect acute and chronic medical conditions contributing as evidenced by PO intake <50% of estimated needs for 5 days, mild muscle loss around clavicles, deltoids, temples; mild buccal fat pad loss. Treatment to be determined pending further workup, family discussion, etc.    BMI Findings: Body mass index is 20.75 kg/m². Compression fracture of L5 vertebra with routine healing  Assessment & Plan  · Again noted on CT imaging worsened from prior examination, additionally with marked compression deformity at L4.   Patient denying any increase in back pain from baseline  · We will continue to monitor exam and pain, if increases further work-up as appropriate    Dysphagia  Assessment & Plan  · Speech eval appreciated  · VBS shows no swallowing function  ·   · Status post PEG tube placement -tube feeds currently at goal patient is tolerating    Hyponatremia  Assessment & Plan  · Sodium 131 on admission, possibly due to poor recent oral intake and nausea/vomiting  · 132 today we will adjust tube feeds and free water flushes as needed    Chronic pain syndrome  Assessment & Plan  · Patient reporting no increase in chronic pain at this time  · PDMP reviewed, we will continue with low-dose oxycodone as needed  ·     Rheumatoid arthritis involving multiple sites with positive rheumatoid factor (HCC)  Assessment & Plan  · Home dose Plaquenil 200 mg in a.m. and 100 mg in PM, methotrexate 7.5 mg every Thursday  · Continue home medications    * Generalized weakness  Assessment & Plan  · Presenting with increased weakness over the past several days with generalized malaise, 3 days nausea/vomiting as well as intermittent diarrhea. Of note was recently hospitalized here also with generalized weakness and acute cystitis, appears rehab was recommended by PT/OT however patient refused this and went home with home therapies. · Possibly multifactorial in the setting of electrolyte abnormalities, pyelonephritis, bacteremia, dysphagia  · PT/OT  - patient bed bound at baseline  · Status post EGD and PEG tube placement with GI, nutrition consult appreciated             VTE Pharmacologic Prophylaxis: VTE Score: 6 High Risk (Score >/= 5) - Pharmacological DVT Prophylaxis Ordered: heparin. Sequential Compression Devices Ordered. Patient Centered Rounds: I performed bedside rounds with nursing staff today. Discussions with Specialists or Other Care Team Provider: n/a    Education and Discussions with Family / Patient: Attempted to update  (niece) via phone. Left voicemail. Total Time Spent on Date of Encounter in care of patient: 35 minutes This time was spent on one or more of the following: performing physical exam; counseling and coordination of care; obtaining or reviewing history; documenting in the medical record; reviewing/ordering tests, medications or procedures; communicating with other healthcare professionals and discussing with patient's family/caregivers.     Current Length of Stay: 6 day(s)  Current Patient Status: Inpatient   Certification Statement: The patient will continue to require additional inpatient hospital stay due to Monitoring tube feedings, discharge home pending on approval of tube feed  Discharge Plan: Anticipate discharge in 24-48 hrs to home with home services. Code Status: Level 3 - DNAR and DNI    Subjective:   Reports abdominal pain around her PEG site otherwise denies any acute complaints    Objective:     Vitals:   Temp (24hrs), Av.1 °F (36.7 °C), Min:97.6 °F (36.4 °C), Max:99.2 °F (37.3 °C)    Temp:  [97.6 °F (36.4 °C)-99.2 °F (37.3 °C)] 98 °F (36.7 °C)  HR:  [] 85  Resp:  [17-19] 17  BP: (114-176)/() 114/66  SpO2:  [97 %-100 %] 99 %  Body mass index is 20.75 kg/m². Input and Output Summary (last 24 hours): Intake/Output Summary (Last 24 hours) at 2023 1544  Last data filed at 2023 1404  Gross per 24 hour   Intake 875 ml   Output 950 ml   Net -75 ml       Physical Exam:   Physical Exam  Vitals and nursing note reviewed. Constitutional:       General: She is not in acute distress. Appearance: She is well-developed. She is not toxic-appearing or diaphoretic. Comments: Cachectic appearance with temporal muscle wasting   HENT:      Head: Normocephalic and atraumatic. Eyes:      General: No scleral icterus. Conjunctiva/sclera: Conjunctivae normal.   Cardiovascular:      Rate and Rhythm: Normal rate and regular rhythm. Heart sounds: No murmur heard. No friction rub. No gallop. Pulmonary:      Effort: Pulmonary effort is normal. No respiratory distress. Breath sounds: Normal breath sounds. No stridor. No wheezing, rhonchi or rales. Chest:      Chest wall: No tenderness. Abdominal:      General: There is no distension. Palpations: Abdomen is soft. There is no mass. Tenderness: There is no abdominal tenderness. There is no guarding or rebound. Hernia: No hernia is present. Comments: PEG in place without surrounding erythema or drainage   Musculoskeletal:         General: No swelling or tenderness. Cervical back: Neck supple. Skin:     General: Skin is warm and dry. Capillary Refill: Capillary refill takes less than 2 seconds.    Neurological:      Mental Status: She is alert and oriented to person, place, and time. Psychiatric:         Mood and Affect: Mood normal.          Additional Data:     Labs:  Results from last 7 days   Lab Units 07/01/23  0719 06/30/23  0716 06/26/23  0502 06/25/23  1649   WBC Thousand/uL 10.56* 9.37   < > 17.46*   HEMOGLOBIN g/dL 11.0* 11.4*   < > 11.3*   HEMATOCRIT % 33.8* 36.1   < > 36.7   PLATELETS Thousands/uL 408* 384   < > 241   BANDS PCT %  --  1  --   --    NEUTROS PCT %  --   --   --  86*   LYMPHS PCT %  --   --   --  3*   LYMPHO PCT %  --  5*  --   --    MONOS PCT %  --   --   --  10   MONO PCT %  --  8  --   --    EOS PCT %  --  0  --  0    < > = values in this interval not displayed. Results from last 7 days   Lab Units 07/01/23  0719   SODIUM mmol/L 132*   POTASSIUM mmol/L 2.7*   CHLORIDE mmol/L 98   CO2 mmol/L 30   BUN mg/dL 7   CREATININE mg/dL 0.48*   ANION GAP mmol/L 4   CALCIUM mg/dL 8.7   ALBUMIN g/dL 2.1*   TOTAL BILIRUBIN mg/dL 0.22   ALK PHOS U/L 124*   ALT U/L 20   AST U/L 14   GLUCOSE RANDOM mg/dL 124     Results from last 7 days   Lab Units 06/25/23  1649   INR  1.00     Results from last 7 days   Lab Units 06/29/23  1313   POC GLUCOSE mg/dl 64*         Results from last 7 days   Lab Units 06/26/23  0434 06/25/23  1649   LACTIC ACID mmol/L  --  1.8   PROCALCITONIN ng/ml 3.47* 3.48*       Lines/Drains:  Invasive Devices     Peripheral Intravenous Line  Duration           Peripheral IV 06/30/23 Right;Ventral (anterior) Forearm 1 day          Drain  Duration           External Urinary Catheter 3 days    Gastrostomy/Enterostomy Percutaneous Endoscopic Gastrostomy (PEG) 20 Fr. RUQ 2 days                      Imaging: No pertinent imaging reviewed. Recent Cultures (last 7 days):   Results from last 7 days   Lab Units 06/26/23  0256 06/25/23  1649   BLOOD CULTURE   --  No Growth After 5 Days.   Escherichia coli*   GRAM STAIN RESULT   --  Gram negative rods*   URINE CULTURE  70,000-79,000 cfu/ml  --        Last 24 Hours Medication List:   Current Facility-Administered Medications   Medication Dose Route Frequency Provider Last Rate   • cefTRIAXone  1,000 mg Intravenous Q24H Yuki Ovalle MD 1,000 mg (07/01/23 0841)   • Diclofenac Sodium  2 g Topical 4x Daily Cheng Baird PA-C     • folic acid  2 mg Per PEG Tube Daily Bassem Santillan, DO     • gabapentin  200 mg Per PEG Tube TID Bassem Santillan, DO     • heparin (porcine)  5,000 Units Subcutaneous [facility] Carisa Sun DO     • HYDROmorphone  0.2 mg Intravenous Q4H PRN Cheng Baird PA-C     • hydroxychloroquine  200 mg Per PEG Tube Daily With Breakfast Bassem Santillan DO      And   • hydroxychloroquine  100 mg Per PEG Tube Daily With [employer], DO     • levothyroxine  75 mcg Per PEG Tube Early Morning Bassme Santillan DO     • magnesium Oxide  400 mg Per PEG Tube BID Bassem Santillan DO     • melatonin  3 mg Per PEG Tube HS Bassem Santillan, DO     • methocarbamol  500 mg Per PEG Tube Daily PRN Bassem Santillan DO     • nortriptyline  100 mg Per PEG Tube HS Bassem Santillan, DO     • ondansetron  4 mg Intravenous Q6H PRN Carisa Sun DO     • oxyCODONE  2.5 mg Oral Q4H PRN Bassem Santillan DO     • pantoprazole  40 mg Intravenous Q24H [facility] Hillary Roldan PA-C     • potassium chloride  40 mEq Per PEG Tube Once Bassem Santillan DO     • rOPINIRole  2 mg Per G Tube HS Rosana Martinez DO          Today, Patient Was Seen By: Rosana Martinez DO    **Please Note: This note may have been constructed using a voice recognition system. **

## 2023-07-01 NOTE — ASSESSMENT & PLAN NOTE
Malnutrition Findings:   Adult Malnutrition type:  (suspect acute and chronic components)  Adult Degree of Malnutrition: Malnutrition of moderate degree  Malnutrition Characteristics: Fat loss, Muscle loss, Inadequate energy                  360 Statement: Moderate malnutrition r/t suspect acute and chronic medical conditions contributing as evidenced by PO intake <50% of estimated needs for 5 days, mild muscle loss around clavicles, deltoids, temples; mild buccal fat pad loss. Treatment to be determined pending further workup, family discussion, etc.    BMI Findings: Body mass index is 20.75 kg/m².

## 2023-07-01 NOTE — ASSESSMENT & PLAN NOTE
· Potassium 2.7 on admission, replacement initiated during ED evaluation.   Possibly due to the nausea/vomiting and poor oral intake  · Potassium 2.7 again this morning, will supplement potassium and magnesium aggressively

## 2023-07-01 NOTE — ASSESSMENT & PLAN NOTE
· Sodium 131 on admission, possibly due to poor recent oral intake and nausea/vomiting  · 132 today we will adjust tube feeds and free water flushes as needed

## 2023-07-02 LAB
ALBUMIN SERPL BCP-MCNC: 2 G/DL (ref 3.5–5)
ALP SERPL-CCNC: 106 U/L (ref 46–116)
ALT SERPL W P-5'-P-CCNC: 17 U/L (ref 12–78)
ANION GAP SERPL CALCULATED.3IONS-SCNC: 3 MMOL/L
AST SERPL W P-5'-P-CCNC: 6 U/L (ref 5–45)
BILIRUB SERPL-MCNC: 0.15 MG/DL (ref 0.2–1)
BUN SERPL-MCNC: 11 MG/DL (ref 5–25)
CALCIUM ALBUM COR SERPL-MCNC: 10.6 MG/DL (ref 8.3–10.1)
CALCIUM SERPL-MCNC: 9 MG/DL (ref 8.3–10.1)
CHLORIDE SERPL-SCNC: 105 MMOL/L (ref 96–108)
CO2 SERPL-SCNC: 26 MMOL/L (ref 21–32)
CREAT SERPL-MCNC: 0.47 MG/DL (ref 0.6–1.3)
ERYTHROCYTE [DISTWIDTH] IN BLOOD BY AUTOMATED COUNT: 16 % (ref 11.6–15.1)
GFR SERPL CREATININE-BSD FRML MDRD: 94 ML/MIN/1.73SQ M
GLUCOSE SERPL-MCNC: 111 MG/DL (ref 65–140)
HCT VFR BLD AUTO: 31.8 % (ref 34.8–46.1)
HGB BLD-MCNC: 10.2 G/DL (ref 11.5–15.4)
MAGNESIUM SERPL-MCNC: 1.9 MG/DL (ref 1.6–2.6)
MCH RBC QN AUTO: 26.8 PG (ref 26.8–34.3)
MCHC RBC AUTO-ENTMCNC: 32.1 G/DL (ref 31.4–37.4)
MCV RBC AUTO: 84 FL (ref 82–98)
PHOSPHATE SERPL-MCNC: 2.9 MG/DL (ref 2.3–4.1)
PLATELET # BLD AUTO: 427 THOUSANDS/UL (ref 149–390)
PMV BLD AUTO: 8.7 FL (ref 8.9–12.7)
POTASSIUM SERPL-SCNC: 4.7 MMOL/L (ref 3.5–5.3)
PROT SERPL-MCNC: 6.3 G/DL (ref 6.4–8.4)
RBC # BLD AUTO: 3.8 MILLION/UL (ref 3.81–5.12)
SODIUM SERPL-SCNC: 134 MMOL/L (ref 135–147)
WBC # BLD AUTO: 9.62 THOUSAND/UL (ref 4.31–10.16)

## 2023-07-02 PROCEDURE — 99232 SBSQ HOSP IP/OBS MODERATE 35: CPT | Performed by: INTERNAL MEDICINE

## 2023-07-02 PROCEDURE — 84100 ASSAY OF PHOSPHORUS: CPT | Performed by: INTERNAL MEDICINE

## 2023-07-02 PROCEDURE — 83735 ASSAY OF MAGNESIUM: CPT | Performed by: INTERNAL MEDICINE

## 2023-07-02 PROCEDURE — 80053 COMPREHEN METABOLIC PANEL: CPT | Performed by: INTERNAL MEDICINE

## 2023-07-02 PROCEDURE — 85027 COMPLETE CBC AUTOMATED: CPT | Performed by: INTERNAL MEDICINE

## 2023-07-02 PROCEDURE — C9113 INJ PANTOPRAZOLE SODIUM, VIA: HCPCS | Performed by: PHYSICIAN ASSISTANT

## 2023-07-02 RX ADMIN — CEFTRIAXONE SODIUM 1000 MG: 10 INJECTION, POWDER, FOR SOLUTION INTRAVENOUS at 08:42

## 2023-07-02 RX ADMIN — GABAPENTIN 200 MG: 100 CAPSULE ORAL at 08:34

## 2023-07-02 RX ADMIN — HEPARIN SODIUM 5000 UNITS: 5000 INJECTION INTRAVENOUS; SUBCUTANEOUS at 20:30

## 2023-07-02 RX ADMIN — MAGNESIUM OXIDE TAB 400 MG (241.3 MG ELEMENTAL MG) 400 MG: 400 (241.3 MG) TAB at 17:47

## 2023-07-02 RX ADMIN — LEVOTHYROXINE SODIUM 75 MCG: 75 TABLET ORAL at 05:43

## 2023-07-02 RX ADMIN — HYDROXYCHLOROQUINE SULFATE 100 MG: 200 TABLET ORAL at 16:37

## 2023-07-02 RX ADMIN — DICLOFENAC SODIUM 2 G: 10 GEL TOPICAL at 11:29

## 2023-07-02 RX ADMIN — GABAPENTIN 200 MG: 100 CAPSULE ORAL at 20:30

## 2023-07-02 RX ADMIN — MELATONIN TAB 3 MG 3 MG: 3 TAB at 20:29

## 2023-07-02 RX ADMIN — GABAPENTIN 200 MG: 100 CAPSULE ORAL at 16:37

## 2023-07-02 RX ADMIN — Medication 2.5 MG: at 08:34

## 2023-07-02 RX ADMIN — FOLIC ACID 2 MG: 1 TABLET ORAL at 08:34

## 2023-07-02 RX ADMIN — HYDROXYCHLOROQUINE SULFATE 200 MG: 200 TABLET ORAL at 08:34

## 2023-07-02 RX ADMIN — ROPINIROLE 2 MG: 2 TABLET, FILM COATED ORAL at 20:30

## 2023-07-02 RX ADMIN — HEPARIN SODIUM 5000 UNITS: 5000 INJECTION INTRAVENOUS; SUBCUTANEOUS at 05:43

## 2023-07-02 RX ADMIN — NORTRIPTYLINE HYDROCHLORIDE 100 MG: 50 CAPSULE ORAL at 20:29

## 2023-07-02 RX ADMIN — Medication 2.5 MG: at 17:47

## 2023-07-02 RX ADMIN — PANTOPRAZOLE SODIUM 40 MG: 40 INJECTION, POWDER, FOR SOLUTION INTRAVENOUS at 08:34

## 2023-07-02 RX ADMIN — Medication 2.5 MG: at 13:55

## 2023-07-02 RX ADMIN — MAGNESIUM OXIDE TAB 400 MG (241.3 MG ELEMENTAL MG) 400 MG: 400 (241.3 MG) TAB at 08:34

## 2023-07-02 RX ADMIN — DICLOFENAC SODIUM 2 G: 10 GEL TOPICAL at 17:47

## 2023-07-02 RX ADMIN — HEPARIN SODIUM 5000 UNITS: 5000 INJECTION INTRAVENOUS; SUBCUTANEOUS at 13:55

## 2023-07-02 NOTE — ASSESSMENT & PLAN NOTE
· Sodium 131 on admission, possibly due to poor recent oral intake and nausea/vomiting  · 134 today we will adjust tube feeds and free water flushes as needed

## 2023-07-02 NOTE — ASSESSMENT & PLAN NOTE
· Speech eval appreciated  · VBS shows no swallowing function  ·   · Status post PEG tube placement -tube feeds currently at goal patient is tolerating-with some moderate abdominal discomfort

## 2023-07-02 NOTE — PROGRESS NOTES
[address] [place] [address]  Progress Note  Name: Luke Melendez I  MRN: [id number]  Unit/Bed#: [facility] 926-01 I Date of Admission: 6/25/2023   Date of Service: 7/2/2023 I Hospital Day: 7    Assessment/Plan   Moderate protein-calorie malnutrition ([address])  Assessment & Plan  Malnutrition Findings:   Adult Malnutrition type:  (suspect acute and chronic components)  Adult Degree of Malnutrition: Malnutrition of moderate degree  Malnutrition Characteristics: Fat loss, Muscle loss, Inadequate energy                  360 Statement: Moderate malnutrition r/t suspect acute and chronic medical conditions contributing as evidenced by PO intake <50% of estimated needs for 5 days, mild muscle loss around clavicles, deltoids, temples; mild buccal fat pad loss. Treatment to be determined pending further workup, family discussion, etc.    BMI Findings: Body mass index is 20.64 kg/m². Bacteremia due to Escherichia coli  Assessment & Plan  · 1 out of 2 blood cultures positive for E. Coli  · Suspect in setting of acute pyelonephritis  · Continue Ceftriaxone    Hypokalemia  Assessment & Plan  · Potassium 2.7 on admission, replacement initiated during ED evaluation. Possibly due to the nausea/vomiting and poor oral intake  · Potassium 2.7 yesterday morning improved to about 4 with supplementation, will continue to monitor    Nausea and vomiting  Assessment & Plan  · 3 days of nausea/vomiting per patient, CT abdomen/pelvis with concerns for right-sided pyelonephritis along with distention of upper esophagus with scattered degree in esophagus for which relation to reflux with esophageal dysfunction was not excluded  · Management of the pyelonephritis as above. Start IV Protonix for the potential reflux and assess response.   · Appears to have resolved    Pyelonephritis  Assessment & Plan  · Was treated for acute cystitis during recent admission, CT ab/pelvis now with findings suspicious for right pyelonephritis. Patient without complaints of right side abdominal/flank pain and with benign examination. UA with WBCs and bacteria. Elevated procalcitonin and WBC is noted  · Received cefepime during ED evaluation, will continue with ceftriaxone which is sensitive  · Trend WC, temperature curve, hemodynamics    Protein calorie malnutrition (HCC)  Assessment & Plan  Malnutrition Findings:   Adult Malnutrition type:  (suspect acute and chronic components)  Adult Degree of Malnutrition: Malnutrition of moderate degree  Malnutrition Characteristics: Fat loss, Muscle loss, Inadequate energy                  360 Statement: Moderate malnutrition r/t suspect acute and chronic medical conditions contributing as evidenced by PO intake <50% of estimated needs for 5 days, mild muscle loss around clavicles, deltoids, temples; mild buccal fat pad loss. Treatment to be determined pending further workup, family discussion, etc.    BMI Findings: Body mass index is 20.64 kg/m². Compression fracture of L5 vertebra with routine healing  Assessment & Plan  · Again noted on CT imaging worsened from prior examination, additionally with marked compression deformity at L4.   Patient denying any increase in back pain from baseline  · We will continue to monitor exam and pain, if increases further work-up as appropriate    Dysphagia  Assessment & Plan  · Speech eval appreciated  · VBS shows no swallowing function  ·   · Status post PEG tube placement -tube feeds currently at goal patient is tolerating-with some moderate abdominal discomfort    Hyponatremia  Assessment & Plan  · Sodium 131 on admission, possibly due to poor recent oral intake and nausea/vomiting  · 134 today we will adjust tube feeds and free water flushes as needed    Chronic pain syndrome  Assessment & Plan  · Patient reporting no increase in chronic pain at this time  · PDMP reviewed, we will continue with low-dose oxycodone as needed  ·     Rheumatoid arthritis involving multiple sites with positive rheumatoid factor (HCC)  Assessment & Plan  · Home dose Plaquenil 200 mg in a.m. and 100 mg in PM, methotrexate 7.5 mg every Thursday  · Continue home medications    * Generalized weakness  Assessment & Plan  · Presenting with increased weakness over the past several days with generalized malaise, 3 days nausea/vomiting as well as intermittent diarrhea. Of note was recently hospitalized here also with generalized weakness and acute cystitis, appears rehab was recommended by PT/OT however patient refused this and went home with home therapies. · Possibly multifactorial in the setting of electrolyte abnormalities, pyelonephritis, bacteremia, dysphagia  · PT/OT  - patient bed bound at baseline  · Status post EGD and PEG tube placement with GI, nutrition consult appreciated               VTE Pharmacologic Prophylaxis: VTE Score: 6 High Risk (Score >/= 5) - Pharmacological DVT Prophylaxis Ordered: heparin. Sequential Compression Devices Ordered. Patient Centered Rounds: I performed bedside rounds with nursing staff today. Discussions with Specialists or Other Care Team Provider: n/a    Education and Discussions with Family / Patient: Updated  (niece) via phone. Total Time Spent on Date of Encounter in care of patient: 35 minutes This time was spent on one or more of the following: performing physical exam; counseling and coordination of care; obtaining or reviewing history; documenting in the medical record; reviewing/ordering tests, medications or procedures; communicating with other healthcare professionals and discussing with patient's family/caregivers.     Current Length of Stay: 7 day(s)  Current Patient Status: Inpatient   Certification Statement: The patient will continue to require additional inpatient hospital stay due to Pending discharge once home care set up tube feeds are improved hospital bed delivered  Discharge Plan: Anticipate discharge in 24-48 hrs to home with home services. Code Status: Level 3 - DNAR and DNI    Subjective:   Patient reports moderate abdominal pain around PEG site but it is improving over the past 24 hours    Objective:     Vitals:   Temp (24hrs), Av.5 °F (36.4 °C), Min:97.1 °F (36.2 °C), Max:98.1 °F (36.7 °C)    Temp:  [97.1 °F (36.2 °C)-98.1 °F (36.7 °C)] 98.1 °F (36.7 °C)  HR:  [74-91] 91  Resp:  [15-18] 15  BP: (108-131)/(66-74) 114/67  SpO2:  [98 %-99 %] 98 %  Body mass index is 20.64 kg/m². Input and Output Summary (last 24 hours): Intake/Output Summary (Last 24 hours) at 2023 1733  Last data filed at 2023 1358  Gross per 24 hour   Intake 1318 ml   Output 1150 ml   Net 168 ml       Physical Exam:   Physical Exam  Vitals and nursing note reviewed. Constitutional:       General: She is not in acute distress. Appearance: She is well-developed. She is not toxic-appearing or diaphoretic. Comments: Cachectic appearance with temporal muscle wasting   HENT:      Head: Normocephalic and atraumatic. Eyes:      General: No scleral icterus. Conjunctiva/sclera: Conjunctivae normal.   Cardiovascular:      Rate and Rhythm: Normal rate and regular rhythm. Heart sounds: No murmur heard. No friction rub. No gallop. Pulmonary:      Effort: Pulmonary effort is normal. No respiratory distress. Breath sounds: Normal breath sounds. No stridor. No wheezing, rhonchi or rales. Chest:      Chest wall: No tenderness. Abdominal:      General: There is no distension. Palpations: Abdomen is soft. There is no mass. Tenderness: There is no abdominal tenderness. There is no guarding or rebound. Hernia: No hernia is present. Comments: PEG in place without surrounding erythema or drainage   Musculoskeletal:         General: No swelling or tenderness. Cervical back: Neck supple. Skin:     General: Skin is warm and dry.       Capillary Refill: Capillary refill takes less than 2 seconds. Neurological:      Mental Status: She is alert and oriented to person, place, and time. Psychiatric:         Mood and Affect: Mood normal.          Additional Data:     Labs:  Results from last 7 days   Lab Units 07/02/23  0600 07/01/23  0719 06/30/23  0716   WBC Thousand/uL 9.62   < > 9.37   HEMOGLOBIN g/dL 10.2*   < > 11.4*   HEMATOCRIT % 31.8*   < > 36.1   PLATELETS Thousands/uL 427*   < > 384   BANDS PCT %  --   --  1   LYMPHO PCT %  --   --  5*   MONO PCT %  --   --  8   EOS PCT %  --   --  0    < > = values in this interval not displayed. Results from last 7 days   Lab Units 07/02/23  0600   SODIUM mmol/L 134*   POTASSIUM mmol/L 4.7   CHLORIDE mmol/L 105   CO2 mmol/L 26   BUN mg/dL 11   CREATININE mg/dL 0.47*   ANION GAP mmol/L 3   CALCIUM mg/dL 9.0   ALBUMIN g/dL 2.0*   TOTAL BILIRUBIN mg/dL 0.15*   ALK PHOS U/L 106   ALT U/L 17   AST U/L 6   GLUCOSE RANDOM mg/dL 111         Results from last 7 days   Lab Units 06/29/23  1313   POC GLUCOSE mg/dl 64*         Results from last 7 days   Lab Units 06/26/23  0434   PROCALCITONIN ng/ml 3.47*       Lines/Drains:  Invasive Devices     Peripheral Intravenous Line  Duration           Peripheral IV 06/30/23 Right;Ventral (anterior) Forearm 2 days          Drain  Duration           External Urinary Catheter 4 days    Gastrostomy/Enterostomy Percutaneous Endoscopic Gastrostomy (PEG) 20 Fr. RUQ 3 days                      Imaging: No pertinent imaging reviewed.     Recent Cultures (last 7 days):   Results from last 7 days   Lab Units 06/26/23  0256   URINE CULTURE  70,000-79,000 cfu/ml       Last 24 Hours Medication List:   Current Facility-Administered Medications   Medication Dose Route Frequency Provider Last Rate   • cefTRIAXone  1,000 mg Intravenous Q24H Bassem Santillan DO 1,000 mg (07/02/23 8244)   • Diclofenac Sodium  2 g Topical 4x Daily Jocelyn Benitez PA-C     • folic acid  2 mg Per PEG Tube Daily Bassem Santillan DO     • gabapentin  200 mg Per PEG Tube TID Bassem Santillan, DO     • heparin (porcine)  5,000 Units Subcutaneous [facility] Donald Cleveland, DO     • HYDROmorphone  0.2 mg Intravenous Q4H PRN Maggie Mojica PA-C     • hydroxychloroquine  200 mg Per PEG Tube Daily With Breakfast Bassem Santillan, DO      And   • hydroxychloroquine  100 mg Per PEG Tube Daily With [employer], DO     • levothyroxine  75 mcg Per PEG Tube Early Morning Bassem Santillan, DO     • magnesium Oxide  400 mg Per PEG Tube BID Bassem Santillan, DO     • melatonin  3 mg Per PEG Tube HS Bassem Santillan, DO     • methocarbamol  500 mg Per PEG Tube Daily PRN Bassem Santillan DO     • nortriptyline  100 mg Per PEG Tube HS Bassem Santillan, DO     • ondansetron  4 mg Intravenous Q6H PRN Odilon Russell, DO     • oxyCODONE  2.5 mg Oral Q4H PRN Bassem Santillan, DO     • pantoprazole  40 mg Intravenous Q24H [address] Hillary Roldan PA-C     • rOPINIRole  2 mg Per G Tube HS Gaetano Moore DO          Today, Patient Was Seen By: Gaetano Moore DO    **Please Note: This note may have been constructed using a voice recognition system. **

## 2023-07-02 NOTE — ASSESSMENT & PLAN NOTE
· Potassium 2.7 on admission, replacement initiated during ED evaluation.   Possibly due to the nausea/vomiting and poor oral intake  · Potassium 2.7 yesterday morning improved to about 4 with supplementation, will continue to monitor

## 2023-07-02 NOTE — ASSESSMENT & PLAN NOTE
Malnutrition Findings:   Adult Malnutrition type:  (suspect acute and chronic components)  Adult Degree of Malnutrition: Malnutrition of moderate degree  Malnutrition Characteristics: Fat loss, Muscle loss, Inadequate energy                  360 Statement: Moderate malnutrition r/t suspect acute and chronic medical conditions contributing as evidenced by PO intake <50% of estimated needs for 5 days, mild muscle loss around clavicles, deltoids, temples; mild buccal fat pad loss. Treatment to be determined pending further workup, family discussion, etc.    BMI Findings: Body mass index is 20.64 kg/m².

## 2023-07-02 NOTE — PLAN OF CARE
Problem: Potential for Falls  Goal: Patient will remain free of falls  Description: INTERVENTIONS:  - Educate patient/family on patient safety including physical limitations  - Instruct patient to call for assistance with activity   - Consult OT/PT to assist with strengthening/mobility   - Keep Call bell within reach  - Keep bed low and locked with side rails adjusted as appropriate  - Keep care items and personal belongings within reach  - Initiate and maintain comfort rounds  - Make Fall Risk Sign visible to staff  - Apply yellow socks and bracelet for high fall risk patients  - Consider moving patient to room near nurses station  Outcome: Progressing     Problem: MOBILITY - ADULT  Goal: Maintain or return to baseline ADL function  Description: INTERVENTIONS:  -  Assess patient's ability to carry out ADLs; assess patient's baseline for ADL function and identify physical deficits which impact ability to perform ADLs (bathing, care of mouth/teeth, toileting, grooming, dressing, etc.)  - Assess/evaluate cause of self-care deficits   - Assess range of motion  - Assess patient's mobility; develop plan if impaired  - Assess patient's need for assistive devices and provide as appropriate  - Encourage maximum independence but intervene and supervise when necessary  - Involve family in performance of ADLs  - Assess for home care needs following discharge   - Consider OT consult to assist with ADL evaluation and planning for discharge  - Provide patient education as appropriate  Outcome: Progressing  Goal: Maintains/Returns to pre admission functional level  Description: INTERVENTIONS:  - Perform BMAT or MOVE assessment daily.   - Set and communicate daily mobility goal to care team and patient/family/caregiver.    - Collaborate with rehabilitation services on mobility goals if consulted  - Record patient progress and toleration of activity level   Outcome: Progressing     Problem: Prexisting or High Potential for Compromised Skin Integrity  Goal: Skin integrity is maintained or improved  Description: INTERVENTIONS:  - Identify patients at risk for skin breakdown  - Assess and monitor skin integrity  - Assess and monitor nutrition and hydration status  - Monitor labs   - Assess for incontinence   - Turn and reposition patient  - Assist with mobility/ambulation  - Relieve pressure over bony prominences  - Avoid friction and shearing  - Provide appropriate hygiene as needed including keeping skin clean and dry  - Evaluate need for skin moisturizer/barrier cream  - Collaborate with interdisciplinary team   - Patient/family teaching  - Consider wound care consult   Outcome: Progressing     Problem: Nutrition/Hydration-ADULT  Goal: Nutrient/Hydration intake appropriate for improving, restoring or maintaining nutritional needs  Description: Monitor and assess patient's nutrition/hydration status for malnutrition. Collaborate with interdisciplinary team and initiate plan and interventions as ordered. Monitor patient's weight and dietary intake as ordered or per policy. Utilize nutrition screening tool and intervene as necessary. Determine patient's food preferences and provide high-protein, high-caloric foods as appropriate.      INTERVENTIONS:  - Monitor oral intake, urinary output, labs, and treatment plans  - Assess nutrition and hydration status and recommend course of action  - Evaluate amount of meals eaten  - Assist patient with eating if necessary   - Allow adequate time for meals  - Recommend/ encourage appropriate diets, oral nutritional supplements, and vitamin/mineral supplements  - Order, calculate, and assess calorie counts as needed  - Recommend, monitor, and adjust tube feedings and TPN/PPN based on assessed needs  - Assess need for intravenous fluids  - Provide specific nutrition/hydration education as appropriate  - Include patient/family/caregiver in decisions related to nutrition  Outcome: Progressing Problem: PAIN - ADULT  Goal: Verbalizes/displays adequate comfort level or baseline comfort level  Description: Interventions:  - Encourage patient to monitor pain and request assistance  - Assess pain using appropriate pain scale  - Administer analgesics based on type and severity of pain and evaluate response  - Implement non-pharmacological measures as appropriate and evaluate response  - Consider cultural and social influences on pain and pain management  - Notify physician/advanced practitioner if interventions unsuccessful or patient reports new pain  Outcome: Progressing     Problem: INFECTION - ADULT  Goal: Absence or prevention of progression during hospitalization  Description: INTERVENTIONS:  - Assess and monitor for signs and symptoms of infection  - Monitor lab/diagnostic results  - Monitor all insertion sites, i.e. indwelling lines, tubes, and drains  - Monitor endotracheal if appropriate and nasal secretions for changes in amount and color  - [place] appropriate cooling/warming therapies per order  - Administer medications as ordered  - Instruct and encourage patient and family to use good hand hygiene technique  - Identify and instruct in appropriate isolation precautions for identified infection/condition  Outcome: Progressing  Goal: Absence of fever/infection during neutropenic period  Description: INTERVENTIONS:  - Monitor WBC    Outcome: Progressing     Problem: DISCHARGE PLANNING  Goal: Discharge to home or other facility with appropriate resources  Description: INTERVENTIONS:  - Identify barriers to discharge w/patient and caregiver  - Arrange for needed discharge resources and transportation as appropriate  - Identify discharge learning needs (meds, wound care, etc.)  - Arrange for interpretive services to assist at discharge as needed  - Refer to Case Management Department for coordinating discharge planning if the patient needs post-hospital services based on physician/advanced practitioner order or complex needs related to functional status, cognitive ability, or social support system  Outcome: Progressing     Problem: Knowledge Deficit  Goal: Patient/family/caregiver demonstrates understanding of disease process, treatment plan, medications, and discharge instructions  Description: Complete learning assessment and assess knowledge base.   Interventions:  - Provide teaching at level of understanding  - Provide teaching via preferred learning methods  Outcome: Progressing

## 2023-07-03 ENCOUNTER — HOME CARE VISIT (OUTPATIENT)
Dept: HOME HEALTH SERVICES | Facility: HOME HEALTHCARE | Age: 78
End: 2023-07-03
Payer: MEDICARE

## 2023-07-03 ENCOUNTER — HOME HEALTH ADMISSION (OUTPATIENT)
Dept: HOME HEALTH SERVICES | Facility: HOME HEALTHCARE | Age: 78
End: 2023-07-03

## 2023-07-03 LAB
ALBUMIN SERPL BCP-MCNC: 2.1 G/DL (ref 3.5–5)
ALP SERPL-CCNC: 111 U/L (ref 46–116)
ALT SERPL W P-5'-P-CCNC: 16 U/L (ref 12–78)
ANION GAP SERPL CALCULATED.3IONS-SCNC: 3 MMOL/L
AST SERPL W P-5'-P-CCNC: 5 U/L (ref 5–45)
BILIRUB SERPL-MCNC: 0.15 MG/DL (ref 0.2–1)
BUN SERPL-MCNC: 13 MG/DL (ref 5–25)
CALCIUM ALBUM COR SERPL-MCNC: 11 MG/DL (ref 8.3–10.1)
CALCIUM SERPL-MCNC: 9.5 MG/DL (ref 8.3–10.1)
CHLORIDE SERPL-SCNC: 102 MMOL/L (ref 96–108)
CO2 SERPL-SCNC: 30 MMOL/L (ref 21–32)
CREAT SERPL-MCNC: 0.49 MG/DL (ref 0.6–1.3)
ERYTHROCYTE [DISTWIDTH] IN BLOOD BY AUTOMATED COUNT: 15.9 % (ref 11.6–15.1)
GFR SERPL CREATININE-BSD FRML MDRD: 92 ML/MIN/1.73SQ M
GLUCOSE SERPL-MCNC: 95 MG/DL (ref 65–140)
HCT VFR BLD AUTO: 32.8 % (ref 34.8–46.1)
HGB BLD-MCNC: 10.4 G/DL (ref 11.5–15.4)
MCH RBC QN AUTO: 26.6 PG (ref 26.8–34.3)
MCHC RBC AUTO-ENTMCNC: 31.7 G/DL (ref 31.4–37.4)
MCV RBC AUTO: 84 FL (ref 82–98)
PLATELET # BLD AUTO: 497 THOUSANDS/UL (ref 149–390)
PMV BLD AUTO: 8.7 FL (ref 8.9–12.7)
POTASSIUM SERPL-SCNC: 4.4 MMOL/L (ref 3.5–5.3)
PROT SERPL-MCNC: 6.5 G/DL (ref 6.4–8.4)
RBC # BLD AUTO: 3.91 MILLION/UL (ref 3.81–5.12)
SODIUM SERPL-SCNC: 135 MMOL/L (ref 135–147)
WBC # BLD AUTO: 8.66 THOUSAND/UL (ref 4.31–10.16)

## 2023-07-03 PROCEDURE — 92526 ORAL FUNCTION THERAPY: CPT

## 2023-07-03 PROCEDURE — C9113 INJ PANTOPRAZOLE SODIUM, VIA: HCPCS | Performed by: PHYSICIAN ASSISTANT

## 2023-07-03 PROCEDURE — 80053 COMPREHEN METABOLIC PANEL: CPT | Performed by: INTERNAL MEDICINE

## 2023-07-03 PROCEDURE — 99232 SBSQ HOSP IP/OBS MODERATE 35: CPT | Performed by: INTERNAL MEDICINE

## 2023-07-03 PROCEDURE — 85027 COMPLETE CBC AUTOMATED: CPT | Performed by: INTERNAL MEDICINE

## 2023-07-03 RX ADMIN — FOLIC ACID 2 MG: 1 TABLET ORAL at 11:33

## 2023-07-03 RX ADMIN — GABAPENTIN 200 MG: 100 CAPSULE ORAL at 11:33

## 2023-07-03 RX ADMIN — LEVOTHYROXINE SODIUM 75 MCG: 75 TABLET ORAL at 05:29

## 2023-07-03 RX ADMIN — DICLOFENAC SODIUM 2 G: 10 GEL TOPICAL at 11:34

## 2023-07-03 RX ADMIN — DICLOFENAC SODIUM 2 G: 10 GEL TOPICAL at 22:18

## 2023-07-03 RX ADMIN — GABAPENTIN 200 MG: 100 CAPSULE ORAL at 18:30

## 2023-07-03 RX ADMIN — ROPINIROLE 2 MG: 2 TABLET, FILM COATED ORAL at 22:17

## 2023-07-03 RX ADMIN — HEPARIN SODIUM 5000 UNITS: 5000 INJECTION INTRAVENOUS; SUBCUTANEOUS at 22:17

## 2023-07-03 RX ADMIN — GABAPENTIN 200 MG: 100 CAPSULE ORAL at 22:17

## 2023-07-03 RX ADMIN — HYDROMORPHONE HYDROCHLORIDE 0.2 MG: 0.2 INJECTION, SOLUTION INTRAMUSCULAR; INTRAVENOUS; SUBCUTANEOUS at 14:09

## 2023-07-03 RX ADMIN — HYDROXYCHLOROQUINE SULFATE 200 MG: 200 TABLET ORAL at 11:33

## 2023-07-03 RX ADMIN — HEPARIN SODIUM 5000 UNITS: 5000 INJECTION INTRAVENOUS; SUBCUTANEOUS at 05:29

## 2023-07-03 RX ADMIN — METHOCARBAMOL TABLETS 500 MG: 500 TABLET, COATED ORAL at 11:33

## 2023-07-03 RX ADMIN — NORTRIPTYLINE HYDROCHLORIDE 100 MG: 50 CAPSULE ORAL at 22:18

## 2023-07-03 RX ADMIN — CEFTRIAXONE SODIUM 1000 MG: 10 INJECTION, POWDER, FOR SOLUTION INTRAVENOUS at 11:33

## 2023-07-03 RX ADMIN — MELATONIN TAB 3 MG 3 MG: 3 TAB at 22:17

## 2023-07-03 RX ADMIN — PANTOPRAZOLE SODIUM 40 MG: 40 INJECTION, POWDER, FOR SOLUTION INTRAVENOUS at 11:34

## 2023-07-03 RX ADMIN — HEPARIN SODIUM 5000 UNITS: 5000 INJECTION INTRAVENOUS; SUBCUTANEOUS at 14:09

## 2023-07-03 RX ADMIN — Medication 2.5 MG: at 22:17

## 2023-07-03 RX ADMIN — HYDROMORPHONE HYDROCHLORIDE 0.2 MG: 0.2 INJECTION, SOLUTION INTRAMUSCULAR; INTRAVENOUS; SUBCUTANEOUS at 18:31

## 2023-07-03 RX ADMIN — HYDROXYCHLOROQUINE SULFATE 100 MG: 200 TABLET ORAL at 18:30

## 2023-07-03 NOTE — PROGRESS NOTES
[address] [place] [address]  Progress Note  Name: Faviola Dey I  MRN: [id number]  Unit/Bed#: [facility] 926-01 I Date of Admission: 6/25/2023   Date of Service: 7/3/2023 I Hospital Day: 8    Assessment/Plan   Moderate protein-calorie malnutrition ([address])  Assessment & Plan  Malnutrition Findings:   Adult Malnutrition type:  (suspect acute and chronic components)  Adult Degree of Malnutrition: Malnutrition of moderate degree  Malnutrition Characteristics: Fat loss, Muscle loss, Inadequate energy                  360 Statement: Moderate malnutrition r/t suspect acute and chronic medical conditions contributing as evidenced by PO intake <50% of estimated needs for 5 days, mild muscle loss around clavicles, deltoids, temples; mild buccal fat pad loss. Treatment to be determined pending further workup, family discussion, etc.    BMI Findings: Body mass index is 20.64 kg/m². Bacteremia due to Escherichia coli  Assessment & Plan  · 1 out of 2 blood cultures positive for E. Coli  · Suspect in setting of acute pyelonephritis  · Continue Ceftriaxone    Hypokalemia  Assessment & Plan  · Potassium 2.7 on admission, replacement initiated during ED evaluation. Possibly due to the nausea/vomiting and poor oral intake  · Monitor on daily labs    Nausea and vomiting  Assessment & Plan  · 3 days of nausea/vomiting per patient, CT abdomen/pelvis with concerns for right-sided pyelonephritis along with distention of upper esophagus with scattered degree in esophagus for which relation to reflux with esophageal dysfunction was not excluded  · Management of the pyelonephritis as above. Start IV Protonix for the potential reflux and assess response. · Appears to have resolved    Pyelonephritis  Assessment & Plan  · Was treated for acute cystitis during recent admission, CT ab/pelvis now with findings suspicious for right pyelonephritis.   Patient without complaints of right side abdominal/flank pain and with benign examination. UA with WBCs and bacteria. Elevated procalcitonin and WBC is noted  · Received cefepime during ED evaluation, will continue with ceftriaxone which is sensitive  · Trend WC, temperature curve, hemodynamics    Protein calorie malnutrition (HCC)  Assessment & Plan  Malnutrition Findings:   Adult Malnutrition type:  (suspect acute and chronic components)  Adult Degree of Malnutrition: Malnutrition of moderate degree  Malnutrition Characteristics: Fat loss, Muscle loss, Inadequate energy                  360 Statement: Moderate malnutrition r/t suspect acute and chronic medical conditions contributing as evidenced by PO intake <50% of estimated needs for 5 days, mild muscle loss around clavicles, deltoids, temples; mild buccal fat pad loss. Treatment to be determined pending further workup, family discussion, etc.    BMI Findings: Body mass index is 20.64 kg/m². Compression fracture of L5 vertebra with routine healing  Assessment & Plan  · Again noted on CT imaging worsened from prior examination, additionally with marked compression deformity at L4.   Patient denying any increase in back pain from baseline  · We will continue to monitor exam and pain, if increases further work-up as appropriate    Dysphagia  Assessment & Plan  · Speech eval appreciated-patient evaluated again by speech today for pleasure feed consideration, patient with ongoing aspiration and high risk, will continue on strict n.p.o. status while inpatient  · VBS shows no swallowing function  ·   · Status post PEG tube placement -tube feeds currently at goal patient is tolerating-cussed with nutritionist will adjust to a bolus regiment, awaiting recommendations    Hyponatremia  Assessment & Plan  · Sodium 131 on admission, possibly due to poor recent oral intake and nausea/vomiting  · Sodium 135 today we will adjust tube feeds and free water flushes as needed    Chronic pain syndrome  Assessment & Plan  · Patient reporting no increase in chronic pain at this time  · PDMP reviewed, we will continue with low-dose oxycodone as needed  ·     Rheumatoid arthritis involving multiple sites with positive rheumatoid factor (HCC)  Assessment & Plan  · Home dose Plaquenil 200 mg in a.m. and 100 mg in PM, methotrexate 7.5 mg every Thursday  · Continue home medications    * Generalized weakness  Assessment & Plan  · Presenting with increased weakness over the past several days with generalized malaise, 3 days nausea/vomiting as well as intermittent diarrhea. Of note was recently hospitalized here also with generalized weakness and acute cystitis, appears rehab was recommended by PT/OT however patient refused this and went home with home therapies. · Possibly multifactorial in the setting of electrolyte abnormalities, pyelonephritis, bacteremia, dysphagia  · PT/OT  - pending  · Status post EGD and PEG tube placement with GI, nutrition consult appreciated               VTE Pharmacologic Prophylaxis: VTE Score: 6 High Risk (Score >/= 5) - Pharmacological DVT Prophylaxis Ordered: heparin. Sequential Compression Devices Ordered. Patient Centered Rounds: I performed bedside rounds with nursing staff today. Discussions with Specialists or Other Care Team Provider: n/a    Education and Discussions with Family / Patient: Updated  (sister and niece) at bedside. Total Time Spent on Date of Encounter in care of patient: 35 minutes This time was spent on one or more of the following: performing physical exam; counseling and coordination of care; obtaining or reviewing history; documenting in the medical record; reviewing/ordering tests, medications or procedures; communicating with other healthcare professionals and discussing with patient's family/caregivers.     Current Length of Stay: 8 day(s)  Current Patient Status: Inpatient   Certification Statement: The patient will continue to require additional inpatient hospital stay due to Pending DME acquisition, tube feedings  Discharge Plan: Anticipate discharge in 24-48 hrs to home with home services. Code Status: Level 3 - DNAR and DNI    Subjective:   Patient denies any abdominal pain today, denies any nausea vomiting chest pain shortness of breath headache lightheadedness dizziness    Objective:     Vitals:   Temp (24hrs), Av.3 °F (36.8 °C), Min:98.2 °F (36.8 °C), Max:98.5 °F (36.9 °C)    Temp:  [98.2 °F (36.8 °C)-98.5 °F (36.9 °C)] 98.3 °F (36.8 °C)  HR:  [84-92] 92  Resp:  [16] 16  BP: (112-121)/(65-68) 121/68  SpO2:  [98 %-99 %] 98 %  Body mass index is 20.64 kg/m². Input and Output Summary (last 24 hours): Intake/Output Summary (Last 24 hours) at 7/3/2023 1815  Last data filed at 7/3/2023 0901  Gross per 24 hour   Intake 339 ml   Output 1650 ml   Net -1311 ml       Physical Exam:   Physical Exam  Vitals and nursing note reviewed. Constitutional:       General: She is not in acute distress. Appearance: She is well-developed. She is not toxic-appearing or diaphoretic. Comments: Cachectic appearance with temporal muscle wasting   HENT:      Head: Normocephalic and atraumatic. Eyes:      General: No scleral icterus. Conjunctiva/sclera: Conjunctivae normal.   Cardiovascular:      Rate and Rhythm: Normal rate and regular rhythm. Heart sounds: No murmur heard. No friction rub. No gallop. Pulmonary:      Effort: Pulmonary effort is normal. No respiratory distress. Breath sounds: Normal breath sounds. No stridor. No wheezing, rhonchi or rales. Chest:      Chest wall: No tenderness. Abdominal:      General: There is no distension. Palpations: Abdomen is soft. There is no mass. Tenderness: There is no abdominal tenderness. There is no guarding or rebound. Hernia: No hernia is present. Comments: PEG in place without surrounding erythema or drainage   Musculoskeletal:         General: No swelling or tenderness.       Cervical back: Neck supple. Skin:     General: Skin is warm and dry. Capillary Refill: Capillary refill takes less than 2 seconds. Neurological:      Mental Status: She is alert and oriented to person, place, and time. Psychiatric:         Mood and Affect: Mood normal.          Additional Data:     Labs:  Results from last 7 days   Lab Units 07/03/23  0516 07/01/23  0719 06/30/23  0716   WBC Thousand/uL 8.66   < > 9.37   HEMOGLOBIN g/dL 10.4*   < > 11.4*   HEMATOCRIT % 32.8*   < > 36.1   PLATELETS Thousands/uL 497*   < > 384   BANDS PCT %  --   --  1   LYMPHO PCT %  --   --  5*   MONO PCT %  --   --  8   EOS PCT %  --   --  0    < > = values in this interval not displayed. Results from last 7 days   Lab Units 07/03/23  0516   SODIUM mmol/L 135   POTASSIUM mmol/L 4.4   CHLORIDE mmol/L 102   CO2 mmol/L 30   BUN mg/dL 13   CREATININE mg/dL 0.49*   ANION GAP mmol/L 3   CALCIUM mg/dL 9.5   ALBUMIN g/dL 2.1*   TOTAL BILIRUBIN mg/dL 0.15*   ALK PHOS U/L 111   ALT U/L 16   AST U/L 5   GLUCOSE RANDOM mg/dL 95         Results from last 7 days   Lab Units 06/29/23  1313   POC GLUCOSE mg/dl 64*               Lines/Drains:  Invasive Devices     Peripheral Intravenous Line  Duration           Peripheral IV 06/30/23 Right;Ventral (anterior) Forearm 3 days          Drain  Duration           External Urinary Catheter 5 days    Gastrostomy/Enterostomy Percutaneous Endoscopic Gastrostomy (PEG) 20 Fr. RUQ 4 days                      Imaging: No pertinent imaging reviewed.     Recent Cultures (last 7 days):         Last 24 Hours Medication List:   Current Facility-Administered Medications   Medication Dose Route Frequency Provider Last Rate   • cefTRIAXone  1,000 mg Intravenous Q24H Bassem Santillan DO 1,000 mg (07/03/23 1133)   • Diclofenac Sodium  2 g Topical 4x Daily Smooth Gentile PA-C     • folic acid  2 mg Per PEG Tube Daily Bassem Santillan DO     • gabapentin  200 mg Per PEG Tube TID Bassem Santillan DO     • heparin (porcine) 5,000 Units Subcutaneous [facility] Nisland Flight, DO     • HYDROmorphone  0.2 mg Intravenous Q4H PRN Son Toth PA-C     • hydroxychloroquine  200 mg Per PEG Tube Daily With Breakfast Bassem Santillan, DO      And   • hydroxychloroquine  100 mg Per PEG Tube Daily With [employer], DO     • levothyroxine  75 mcg Per PEG Tube Early Morning Bassem Tyrell, DO     • melatonin  3 mg Per PEG Tube HS Bassem Santillan, DO     • methocarbamol  500 mg Per PEG Tube Daily PRN Bassem Santillan, DO     • nortriptyline  100 mg Per PEG Tube HS Bassem Santillan, DO     • ondansetron  4 mg Intravenous Q6H PRN Junior Montero, DO     • oxyCODONE  2.5 mg Oral Q4H PRN Bassem Santillan, DO     • pantoprazole  40 mg Intravenous Q24H [facility] Hillary Roldan PA-C     • rOPINIRole  2 mg Per G Tube HS Yessy Ray DO          Today, Patient Was Seen By: Yessy Ray DO    **Please Note: This note may have been constructed using a voice recognition system. **

## 2023-07-03 NOTE — ASSESSMENT & PLAN NOTE
· Speech eval appreciated-patient evaluated again by speech today for pleasure feed consideration, patient with ongoing aspiration and high risk, will continue on strict n.p.o. status while inpatient  · VBS shows no swallowing function  ·   · Status post PEG tube placement -tube feeds currently at goal patient is tolerating-cussed with nutritionist will adjust to a bolus regiment, awaiting recommendations

## 2023-07-03 NOTE — ASSESSMENT & PLAN NOTE
· Potassium 2.7 on admission, replacement initiated during ED evaluation.   Possibly due to the nausea/vomiting and poor oral intake  · Monitor on daily labs

## 2023-07-03 NOTE — PLAN OF CARE
Problem: Potential for Falls  Goal: Patient will remain free of falls  Description: INTERVENTIONS:  - Educate patient/family on patient safety including physical limitations  - Instruct patient to call for assistance with activity   - Consult OT/PT to assist with strengthening/mobility   - Keep Call bell within reach  - Keep bed low and locked with side rails adjusted as appropriate  - Keep care items and personal belongings within reach  - Initiate and maintain comfort rounds  - Make Fall Risk Sign visible to staff    - Apply yellow socks and bracelet for high fall risk patients  - Consider moving patient to room near nurses station  Outcome: Progressing     Problem: MOBILITY - ADULT  Goal: Maintain or return to baseline ADL function  Description: INTERVENTIONS:  -  Assess patient's ability to carry out ADLs; assess patient's baseline for ADL function and identify physical deficits which impact ability to perform ADLs (bathing, care of mouth/teeth, toileting, grooming, dressing, etc.)  - Assess/evaluate cause of self-care deficits   - Assess range of motion  - Assess patient's mobility; develop plan if impaired  - Assess patient's need for assistive devices and provide as appropriate  - Encourage maximum independence but intervene and supervise when necessary  - Involve family in performance of ADLs  - Assess for home care needs following discharge   - Consider OT consult to assist with ADL evaluation and planning for discharge  - Provide patient education as appropriate  Outcome: Progressing  Goal: Maintains/Returns to pre admission functional level  Description: INTERVENTIONS:  - Perform BMAT or MOVE assessment daily.   - Set and communicate daily mobility goal to care team and patient/family/caregiver.    - Collaborate with rehabilitation services on mobility goals if consulted  - Record patient progress and toleration of activity level   Outcome: Progressing     Problem: Prexisting or High Potential for Compromised Skin Integrity  Goal: Skin integrity is maintained or improved  Description: INTERVENTIONS:  - Identify patients at risk for skin breakdown  - Assess and monitor skin integrity  - Assess and monitor nutrition and hydration status  - Monitor labs   - Assess for incontinence   - Turn and reposition patient  - Assist with mobility/ambulation  - Relieve pressure over bony prominences  - Avoid friction and shearing  - Provide appropriate hygiene as needed including keeping skin clean and dry  - Evaluate need for skin moisturizer/barrier cream  - Collaborate with interdisciplinary team   - Patient/family teaching  - Consider wound care consult   Outcome: Progressing     Problem: Nutrition/Hydration-ADULT  Goal: Nutrient/Hydration intake appropriate for improving, restoring or maintaining nutritional needs  Description: Monitor and assess patient's nutrition/hydration status for malnutrition. Collaborate with interdisciplinary team and initiate plan and interventions as ordered. Monitor patient's weight and dietary intake as ordered or per policy. Utilize nutrition screening tool and intervene as necessary. Determine patient's food preferences and provide high-protein, high-caloric foods as appropriate.      INTERVENTIONS:  - Monitor oral intake, urinary output, labs, and treatment plans  - Assess nutrition and hydration status and recommend course of action  - Evaluate amount of meals eaten  - Assist patient with eating if necessary   - Allow adequate time for meals  - Recommend/ encourage appropriate diets, oral nutritional supplements, and vitamin/mineral supplements  - Order, calculate, and assess calorie counts as needed  - Recommend, monitor, and adjust tube feedings and TPN/PPN based on assessed needs  - Assess need for intravenous fluids  - Provide specific nutrition/hydration education as appropriate  - Include patient/family/caregiver in decisions related to nutrition  Outcome: Progressing Problem: PAIN - ADULT  Goal: Verbalizes/displays adequate comfort level or baseline comfort level  Description: Interventions:  - Encourage patient to monitor pain and request assistance  - Assess pain using appropriate pain scale  - Administer analgesics based on type and severity of pain and evaluate response  - Implement non-pharmacological measures as appropriate and evaluate response  - Consider cultural and social influences on pain and pain management  - Notify physician/advanced practitioner if interventions unsuccessful or patient reports new pain  Outcome: Progressing     Problem: INFECTION - ADULT  Goal: Absence or prevention of progression during hospitalization  Description: INTERVENTIONS:  - Assess and monitor for signs and symptoms of infection  - Monitor lab/diagnostic results  - Monitor all insertion sites, i.e. indwelling lines, tubes, and drains  - Monitor endotracheal if appropriate and nasal secretions for changes in amount and color  - Rose appropriate cooling/warming therapies per order  - Administer medications as ordered  - Instruct and encourage patient and family to use good hand hygiene technique  - Identify and instruct in appropriate isolation precautions for identified infection/condition  Outcome: Progressing  Goal: Absence of fever/infection during neutropenic period  Description: INTERVENTIONS:  - Monitor WBC    Outcome: Progressing     Problem: DISCHARGE PLANNING  Goal: Discharge to home or other facility with appropriate resources  Description: INTERVENTIONS:  - Identify barriers to discharge w/patient and caregiver  - Arrange for needed discharge resources and transportation as appropriate  - Identify discharge learning needs (meds, wound care, etc.)  - Arrange for interpretive services to assist at discharge as needed  - Refer to Case Management Department for coordinating discharge planning if the patient needs post-hospital services based on physician/advanced practitioner order or complex needs related to functional status, cognitive ability, or social support system  Outcome: Progressing     Problem: Knowledge Deficit  Goal: Patient/family/caregiver demonstrates understanding of disease process, treatment plan, medications, and discharge instructions  Description: Complete learning assessment and assess knowledge base.   Interventions:  - Provide teaching at level of understanding  - Provide teaching via preferred learning methods  Outcome: Progressing

## 2023-07-03 NOTE — PROGRESS NOTES
patient tolerating continuous TF at this; in anticipation of pending discharge home suggest transition to bolus TF prior to discharge vs home with TF pump;   Goal home tube feeding  equivalent to 4 1/2 cartons Jevity 1.0; 300ml water for tube flushes over the day;  (1 carton @4 times and 1/2 carton @ 1 time)  patient is high aspiration risk and will require careful bolus feeding method (i.e. maintaing upright sitting position during bolus feeding; taking at least ~20minutes with each bolus); recommendations noted below    Recommend:  transition Jevity 1.0 to  5 bolus feedings per day at least 3 hours apart (for example 6am, 9am, 12pm, 3pm, 6pm)     start with 120ml each bolus feeding;   increase TF gradually by 60ml progressing to goal of  237ml for the first 4 feedings and 120ml for the last feeding; flush with 30ml water before and after each bolus feeding

## 2023-07-03 NOTE — CASE MANAGEMENT
Case Management Discharge Planning Note    Patient name Scott Lemus  Location [facility] 926/[facility] 926-01 MRN [id number]  : 1945 Date 7/3/2023       Current Admission Date: 2023  Current Admission Diagnosis:Generalized weakness   Patient Active Problem List    Diagnosis Date Noted   • Moderate protein-calorie malnutrition ([address]) 2023   • Bacteremia due to Escherichia coli 2023   • Pyelonephritis 2023   • Nausea and vomiting 2023   • Hypokalemia 2023   • Hypomagnesemia 2023   • Generalized weakness 2023   • Acute cystitis with hematuria 2023   • Urinary retention 2023   • Immunodeficiency due to drugs ([address]) 2023   • Chronic venous hypertension (idiopathic) with ulcer of left lower extremity (CODE) ([address]) 2023   • Diabetic ulcer of left midfoot associated with type 2 diabetes mellitus, with bone involvement without evidence of necrosis ([address]) 2023   • Other diseases of thymus ([address]) 2023   • Protein calorie malnutrition ([address]) 2023   • Weight loss 2023   • Acute encephalopathy 2023   • Polypharmacy 2023   • Hypercalcemia 2023   • Compression fracture of L5 vertebra with routine healing 2023   • Acute low back pain 2023   • Effusion of right shoulder joint 2023   • Arm bruise, right, initial encounter 2023   • Concern for cerebral contusion 2023   • Dysphagia 2023   • 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   • Non-healing open wound of heel, initial encounter 10/12/2022   • Anemia 2022   • Pressure injury of left foot, stage 4 ([address])    • Hyponatremia 2022   • Venous insufficiency 2021   • Encounter for annual wellness exam in Medicare patient 2021   • S/P reverse total shoulder arthroplasty, left 2021   • Rupture long head biceps tendon, left, initial encounter 2021   • Shoulder dislocation 2021 • Depression 09/09/2020   • Other forms of systemic lupus erythematosus ([address]) 08/31/2020   • Vitamin D deficiency 07/24/2019   • Chronic pain syndrome 04/18/2019   • Rheumatoid arthritis involving multiple sites with positive rheumatoid factor ([address]) 03/04/2019   • Ambulatory dysfunction 11/13/2018   • Closed compression fracture of L3 lumbar vertebra 11/13/2018   • S/P total hip arthroplasty 11/27/2017   • Anxiety 08/30/2017   • Urinary tract infection without hematuria 08/30/2017   • RLS (restless legs syndrome) 08/30/2017   • RBBB 08/23/2017   • Age-related osteoporosis with current pathological fracture with routine healing 04/18/2017   • Hypothyroidism due to Hashimoto's thyroiditis 12/13/2016      LOS (days): 8  Geometric Mean LOS (GMLOS) (days): 2.80  Days to GMLOS:-4.9     OBJECTIVE:  Risk of Unplanned Readmission Score: 39.88         Current admission status: Inpatient   Preferred Pharmacy:   [address] [place] [address], [address] [address]  Phone: [phone] Fax: [address], [address] [place] [address]  Phone: [phone] Fax: [phone]    Primary Care Provider: Laura Fox MD    Primary Insurance: MEDICARE  Secondary Insurance:     DISCHARGE DETAILS:       Additional Comments: Per provider, pt is medically clear pending ordering tube feed supplies. CM reached out to pt's niece, Cassandra Biggs, via phone and left a VM at 12:10pm.           UPDATE :     CM received call back from Cassandra Biggs who asked about rehab recommendations. CM reported that pt had not yet been seen by PT/OT, but will request that the provider puts in orders. CM TT provider for PT/OT orders. CM to hold off on ordering tube feed supplies.

## 2023-07-03 NOTE — ASSESSMENT & PLAN NOTE
Per order of Dr. Doroteo Moreno a post void residual was done via Bladder scanner.  PVR was 179 cc.          · 3 days of nausea/vomiting per patient, CT abdomen/pelvis with concerns for right-sided pyelonephritis along with distention of upper esophagus with scattered degree in esophagus for which relation to reflux with esophageal dysfunction was not excluded  · Management of the pyelonephritis as above. Start IV Protonix for the potential reflux and assess response.   · Appears to have resolved

## 2023-07-03 NOTE — SPEECH THERAPY NOTE
Speech Language/Pathology    Speech/Language Pathology Progress Note    Patient Name: Ebenezer Khan  Today's Date: 7/3/2023     Problem List  Principal Problem:    Generalized weakness  Active Problems:    Rheumatoid arthritis involving multiple sites with positive rheumatoid factor (HCC)    Chronic pain syndrome    Hyponatremia    Dysphagia    Compression fracture of L5 vertebra with routine healing    Protein calorie malnutrition (HCC)    Pyelonephritis    Nausea and vomiting    Hypokalemia    Bacteremia due to Escherichia coli    Moderate protein-calorie malnutrition (HCC)       Past Medical History  Past Medical History:   Diagnosis Date   • Acute blood loss anemia 9/9/2020   • Aftercare following joint replacement 9/9/2020    Patient had right reversed total shoulder arthroplasty.  Pain was controlled when he left the hospital.     • Anxiety    • Arthritis    • Cellulitis of left lower extremity 11/30/2019   • Depression    • Disease of thyroid gland     HYPO   • Dyspepsia 11/12/2019   • Dysphagia 2/6/2023   • Femur neck fracture (HCC)    • GERD (gastroesophageal reflux disease)    • Hyperthyroidism     RESOLVED: [id number]   • Hyponatremia 7/25/2022   • Leg pain 2/6/2023   • Osteoporosis    • Polio    • PVD (peripheral vascular disease) ([address])    • Right BBB/left ant fasc block    • Shoulder pain, bilateral 7/27/2021   • UTI (urinary tract infection)    • Varicella infection     LAST ASSESSED: [id number]        Past Surgical History  Past Surgical History:   Procedure Laterality Date   • APPENDECTOMY     • HIP ARTHROPLASTY Left 11/27/2017    Procedure: REMOVAL IM NAIL CONVERSION TO TOTAL HIP ARTHROPLASTY POSTERIOR APPROACH;  Surgeon: Nai Sharma MD;  [facility];  Service: Orthopedics   • HIP FRACTURE SURGERY     • HIP SURGERY      both hips replaced;2013 left ,2014 right   • JOINT REPLACEMENT Right     HIP TOTAL   • [place] ARTHROPLASTY GLENOHUMERAL JOINT TOTAL SHOULDER Right 9/2/2020    Procedure: ARTHROPLASTY SHOULDER REVERSE;  Surgeon: Shannon Ellis MD;  [facility];  Service: Orthopedics   • [place] ARTHROPLASTY GLENOHUMERAL JOINT TOTAL SHOULDER Left 6/1/2021    Procedure: ARTHROPLASTY SHOULDER REVERSE;  Surgeon: Shannon Ellis MD;  [facility];  Service: Orthopedics   • [place] CONV PREV HIP TOT HIP ARTHRP W/WO AGRFT/ALGRFT Left 10/4/2017    Procedure: Roosvelt Noemi removal of left hip;  Surgeon: Mikayla Cabrera MD;  [facility];  Service: Orthopedics   • [place] [address] WRST SURG W/RLS TRANSVRS CARPL LIGM Right 5/24/2022    Procedure: RELEASE CARPAL TUNNEL ENDOSCOPIC-right;  Surgeon: Stephenie Roca MD;  [facility];  Service: Orthopedics   • REVISION TOTAL HIP ARTHROPLASTY Right    • TOE AMPUTATION Left 7/24/2022    Procedure: 5TH METATARSAL RESECTION WITH BOTTOM FLAP CLOSURE;  Surgeon: Elena Byers DPM;  [facility];  Service: Podiatry   • TONSILLECTOMY           Subjective:  Pt was alert and positioned upright. Family at bedside. Objective:  Pt was seen for f/u dysphagia therapy. Pt NPO with tube feeds, allowed for pleasure feeds. Accepted min intake of thin liquids and puree. Bolus control and formation were WNL. Transfers and swallows were min dealyed. Laryngeal rise appeared weak upon palpation. X2 cough with po. ST reviewed with family and primary care giver (daughter) results of VBS. Appeared to understand however upon ST exiting room questioned if pt would receive lunch. ST provided additional education, notified nursing as well. Assessment:  Pt alert. Agreeable to trials for pleasure feeds. Pt and family at bedside provided education on completed. Plan/Recommendations:  Continue NPO as pt high risk of aspiration. ST f/u as indicated. Ensure good oral care.

## 2023-07-03 NOTE — ASSESSMENT & PLAN NOTE
· Presenting with increased weakness over the past several days with generalized malaise, 3 days nausea/vomiting as well as intermittent diarrhea. Of note was recently hospitalized here also with generalized weakness and acute cystitis, appears rehab was recommended by PT/OT however patient refused this and went home with home therapies.   · Possibly multifactorial in the setting of electrolyte abnormalities, pyelonephritis, bacteremia, dysphagia  · PT/OT  - pending  · Status post EGD and PEG tube placement with GI, nutrition consult appreciated

## 2023-07-03 NOTE — ASSESSMENT & PLAN NOTE
· Sodium 131 on admission, possibly due to poor recent oral intake and nausea/vomiting  · Sodium 135 today we will adjust tube feeds and free water flushes as needed

## 2023-07-04 ENCOUNTER — APPOINTMENT (INPATIENT)
Dept: RADIOLOGY | Facility: HOSPITAL | Age: 78
DRG: 689 | End: 2023-07-04
Payer: MEDICARE

## 2023-07-04 PROBLEM — R11.2 NAUSEA AND VOMITING: Status: RESOLVED | Noted: 2023-06-25 | Resolved: 2023-07-04

## 2023-07-04 PROBLEM — R10.9 ABDOMINAL PAIN: Status: ACTIVE | Noted: 2023-07-04

## 2023-07-04 LAB
ALBUMIN SERPL BCP-MCNC: 2.2 G/DL (ref 3.5–5)
ALP SERPL-CCNC: 103 U/L (ref 46–116)
ALT SERPL W P-5'-P-CCNC: 14 U/L (ref 12–78)
ANION GAP SERPL CALCULATED.3IONS-SCNC: 2 MMOL/L
AST SERPL W P-5'-P-CCNC: 10 U/L (ref 5–45)
BILIRUB SERPL-MCNC: 0.29 MG/DL (ref 0.2–1)
BUN SERPL-MCNC: 17 MG/DL (ref 5–25)
CALCIUM ALBUM COR SERPL-MCNC: 10.7 MG/DL (ref 8.3–10.1)
CALCIUM SERPL-MCNC: 9.3 MG/DL (ref 8.3–10.1)
CHLORIDE SERPL-SCNC: 102 MMOL/L (ref 96–108)
CO2 SERPL-SCNC: 30 MMOL/L (ref 21–32)
CREAT SERPL-MCNC: 0.46 MG/DL (ref 0.6–1.3)
GFR SERPL CREATININE-BSD FRML MDRD: 94 ML/MIN/1.73SQ M
GLUCOSE SERPL-MCNC: 109 MG/DL (ref 65–140)
POTASSIUM SERPL-SCNC: 4.2 MMOL/L (ref 3.5–5.3)
PROT SERPL-MCNC: 6.4 G/DL (ref 6.4–8.4)
SODIUM SERPL-SCNC: 134 MMOL/L (ref 135–147)

## 2023-07-04 PROCEDURE — 80053 COMPREHEN METABOLIC PANEL: CPT | Performed by: INTERNAL MEDICINE

## 2023-07-04 PROCEDURE — 74018 RADEX ABDOMEN 1 VIEW: CPT

## 2023-07-04 PROCEDURE — 97167 OT EVAL HIGH COMPLEX 60 MIN: CPT

## 2023-07-04 PROCEDURE — 97163 PT EVAL HIGH COMPLEX 45 MIN: CPT

## 2023-07-04 PROCEDURE — C9113 INJ PANTOPRAZOLE SODIUM, VIA: HCPCS | Performed by: PHYSICIAN ASSISTANT

## 2023-07-04 PROCEDURE — 99232 SBSQ HOSP IP/OBS MODERATE 35: CPT | Performed by: INTERNAL MEDICINE

## 2023-07-04 RX ORDER — POLYETHYLENE GLYCOL 3350 17 G/17G
17 POWDER, FOR SOLUTION ORAL DAILY
Status: DISCONTINUED | OUTPATIENT
Start: 2023-07-04 | End: 2023-07-04

## 2023-07-04 RX ORDER — POLYETHYLENE GLYCOL 3350 17 G/17G
17 POWDER, FOR SOLUTION ORAL DAILY
Status: DISCONTINUED | OUTPATIENT
Start: 2023-07-05 | End: 2023-07-08 | Stop reason: HOSPADM

## 2023-07-04 RX ORDER — POLYETHYLENE GLYCOL 3350 17 G/17G
17 POWDER, FOR SOLUTION ORAL DAILY
Status: DISCONTINUED | OUTPATIENT
Start: 2023-07-05 | End: 2023-07-04

## 2023-07-04 RX ORDER — BISACODYL 10 MG
10 SUPPOSITORY, RECTAL RECTAL DAILY PRN
Status: DISCONTINUED | OUTPATIENT
Start: 2023-07-04 | End: 2023-07-08 | Stop reason: HOSPADM

## 2023-07-04 RX ORDER — HYDROMORPHONE HCL IN WATER/PF 6 MG/30 ML
0.2 PATIENT CONTROLLED ANALGESIA SYRINGE INTRAVENOUS EVERY 4 HOURS PRN
Status: DISCONTINUED | OUTPATIENT
Start: 2023-07-04 | End: 2023-07-08 | Stop reason: HOSPADM

## 2023-07-04 RX ORDER — AMOXICILLIN 250 MG
1 CAPSULE ORAL 2 TIMES DAILY
Status: DISCONTINUED | OUTPATIENT
Start: 2023-07-04 | End: 2023-07-08 | Stop reason: HOSPADM

## 2023-07-04 RX ADMIN — CEFTRIAXONE SODIUM 1000 MG: 10 INJECTION, POWDER, FOR SOLUTION INTRAVENOUS at 09:21

## 2023-07-04 RX ADMIN — SENNOSIDES AND DOCUSATE SODIUM 1 TABLET: 50; 8.6 TABLET ORAL at 18:00

## 2023-07-04 RX ADMIN — DICLOFENAC SODIUM 2 G: 10 GEL TOPICAL at 21:00

## 2023-07-04 RX ADMIN — DICLOFENAC SODIUM 2 G: 10 GEL TOPICAL at 09:22

## 2023-07-04 RX ADMIN — Medication 2.5 MG: at 13:43

## 2023-07-04 RX ADMIN — HEPARIN SODIUM 5000 UNITS: 5000 INJECTION INTRAVENOUS; SUBCUTANEOUS at 05:39

## 2023-07-04 RX ADMIN — HEPARIN SODIUM 5000 UNITS: 5000 INJECTION INTRAVENOUS; SUBCUTANEOUS at 21:00

## 2023-07-04 RX ADMIN — ROPINIROLE 2 MG: 2 TABLET, FILM COATED ORAL at 21:00

## 2023-07-04 RX ADMIN — GABAPENTIN 200 MG: 100 CAPSULE ORAL at 09:22

## 2023-07-04 RX ADMIN — PANTOPRAZOLE SODIUM 40 MG: 40 INJECTION, POWDER, FOR SOLUTION INTRAVENOUS at 09:22

## 2023-07-04 RX ADMIN — METHOCARBAMOL TABLETS 500 MG: 500 TABLET, COATED ORAL at 18:00

## 2023-07-04 RX ADMIN — HEPARIN SODIUM 5000 UNITS: 5000 INJECTION INTRAVENOUS; SUBCUTANEOUS at 13:22

## 2023-07-04 RX ADMIN — GABAPENTIN 200 MG: 100 CAPSULE ORAL at 17:59

## 2023-07-04 RX ADMIN — HYDROXYCHLOROQUINE SULFATE 200 MG: 200 TABLET ORAL at 09:21

## 2023-07-04 RX ADMIN — GABAPENTIN 200 MG: 100 CAPSULE ORAL at 20:51

## 2023-07-04 RX ADMIN — DICLOFENAC SODIUM 2 G: 10 GEL TOPICAL at 18:08

## 2023-07-04 RX ADMIN — LEVOTHYROXINE SODIUM 75 MCG: 75 TABLET ORAL at 05:39

## 2023-07-04 RX ADMIN — FOLIC ACID 2 MG: 1 TABLET ORAL at 09:22

## 2023-07-04 RX ADMIN — DICLOFENAC SODIUM 2 G: 10 GEL TOPICAL at 13:43

## 2023-07-04 RX ADMIN — Medication 2.5 MG: at 09:21

## 2023-07-04 RX ADMIN — MELATONIN TAB 3 MG 3 MG: 3 TAB at 21:00

## 2023-07-04 RX ADMIN — Medication 2.5 MG: at 20:52

## 2023-07-04 RX ADMIN — HYDROXYCHLOROQUINE SULFATE 100 MG: 200 TABLET ORAL at 17:59

## 2023-07-04 RX ADMIN — NORTRIPTYLINE HYDROCHLORIDE 100 MG: 50 CAPSULE ORAL at 21:00

## 2023-07-04 NOTE — PLAN OF CARE
Problem: PHYSICAL THERAPY ADULT  Goal: Performs mobility at highest level of function for planned discharge setting. See evaluation for individualized goals. Description: Treatment/Interventions: Functional transfer training, LE strengthening/ROM, Therapeutic exercise, Endurance training, Patient/family training, Equipment eval/education, Bed mobility, Gait training, Spoke to nursing, Spoke to case management, OT          See flowsheet documentation for full assessment, interventions and recommendations. Note: Prognosis: Fair  Problem List: Decreased strength, Decreased range of motion, Decreased endurance, Impaired balance, Decreased coordination, Decreased mobility, Pain  Assessment: Pt is a 77 y.o. female seen for PT evaluation s/p admit to [address] [place] [address] on 6/25/2023. Pt was admitted with a primary dx of: generalized weakness s/p EGD +PEG tube placement. PT now consulted for assessment of mobility and d/c needs. Pt with Up and OOB as tolerated  orders. Pts current comorbidities effecting treatment include: RA, chronic pain, hyponatremia, dysphagia, compression fx of L5, malnutrition, hypokalemia, bacteremia. Pts current clinical presentation is Unstable/ Unpredictable (high complexity) due to Ongoing medical management for primary dx, Increased reliance on more restrictive AD compared to baseline, Decreased activity tolerance compared to baseline, Fall risk, Increased assistance needed from caregiver at current time, Trending lab values, Continuous pulse oximetry monitoring , s/p surgical intervention. Prior to admission, pt was residing with sister in [address] [place] [address] with [facility] and was using RW, requiring A with ADLs/ IADLs with ADLs/ IADLs. Upon evaluation, pt currently is requiring max Ax2 for bed mobility; max Ax2 for transfers.  Pt presents at PT eval functioning below baseline and currently w/ overall mobility deficits 2* to: BLE weakness, decreased ROM, impaired balance, decreased endurance, impaired coordination, gait deviations, decreased activity tolerance compared to baseline, decreased functional mobility tolerance compared to baseline, fall risk, spinal precautions. Pt currently at a fall risk 2* to impairments listed above. Pt will continue to benefit from skilled acute PT interventions to address stated impairments; to maximize functional mobility; for ongoing pt/ family training; and DME needs. At conclusion of PT session pt returned back in chair and chair alarm engaged with phone and call bell within reach. Pt denies any further questions at this time. The patient's AM-PAC Basic Mobility Inpatient Short Form Raw Score is 7. A Raw score of less than or equal to 16 suggests the patient may benefit from discharge to post-acute rehabilitation services. Please also refer to the recommendation of the Physical Therapist for safe discharge planning. Recommend rehab upon hospital D/C. Barriers to Discharge: Inaccessible home environment     PT Discharge Recommendation: Post acute rehabilitation services    See flowsheet documentation for full assessment.

## 2023-07-04 NOTE — ASSESSMENT & PLAN NOTE
· 1 out of 2 blood cultures positive for E.  Coli  · Suspect in setting of acute pyelonephritis  · Completed abx course

## 2023-07-04 NOTE — ASSESSMENT & PLAN NOTE
· Presenting with increased weakness over the past several days with generalized malaise, 3 days nausea/vomiting as well as intermittent diarrhea. Of note was recently hospitalized here also with generalized weakness and acute cystitis, appears rehab was recommended by PT/OT however patient refused this and went home with home therapies.   · Possibly multifactorial in the setting of electrolyte abnormalities, pyelonephritis, bacteremia, dysphagia  · PT/OT  -  Rehab on discharge, CM following  · Status post EGD and PEG tube placement with GI, nutrition consult appreciated

## 2023-07-04 NOTE — PROGRESS NOTES
[facility]  Progress Note  Name: Cornelius Radford I  MRN: [id number]  Unit/Bed#: [facility]PHP 926-01 I Date of Admission: 6/25/2023   Date of Service: 7/4/2023 I Hospital Day: 9    Assessment/Plan   * Generalized weakness  Assessment & Plan  · Presenting with increased weakness over the past several days with generalized malaise, 3 days nausea/vomiting as well as intermittent diarrhea. Of note was recently hospitalized here also with generalized weakness and acute cystitis, appears rehab was recommended by PT/OT however patient refused this and went home with home therapies. · Possibly multifactorial in the setting of electrolyte abnormalities, pyelonephritis, bacteremia, dysphagia  · PT/OT  -  Rehab on discharge, CM following  · Status post EGD and PEG tube placement with GI, nutrition consult appreciated    Hyponatremia  Assessment & Plan  · Sodium 131 on admission, possibly due to poor recent oral intake and nausea/vomiting  · Now 134    Abdominal pain  Assessment & Plan  · Suspect 2.2 constipation, she is passing flatus  · Check abdomen Xray  · Bowel regimen    Moderate protein-calorie malnutrition (HCC)  Assessment & Plan  Malnutrition Findings:   Adult Malnutrition type:  (suspect acute and chronic components)  Adult Degree of Malnutrition: Malnutrition of moderate degree  Malnutrition Characteristics: Fat loss, Muscle loss, Inadequate energy                  360 Statement: Moderate malnutrition r/t suspect acute and chronic medical conditions contributing as evidenced by PO intake <50% of estimated needs for 5 days, mild muscle loss around clavicles, deltoids, temples; mild buccal fat pad loss. Treatment to be determined pending further workup, family discussion, etc.    BMI Findings: Body mass index is 20.64 kg/m². Bacteremia due to Escherichia coli  Assessment & Plan  · 1 out of 2 blood cultures positive for E.  Coli  · Suspect in setting of acute pyelonephritis  · Completed abx course    Pyelonephritis  Assessment & Plan  · Was treated for acute cystitis during recent admission, CT ab/pelvis now with findings suspicious for right pyelonephritis. Patient without complaints of right side abdominal/flank pain and with benign examination. UA with WBCs and bacteria. Elevated procalcitonin and WBC is noted  · completed abx course,  · Trend WC, temperature curve, hemodynamics    Protein calorie malnutrition (HCC)  Assessment & Plan  Malnutrition Findings:   Adult Malnutrition type:  (suspect acute and chronic components)  Adult Degree of Malnutrition: Malnutrition of moderate degree  Malnutrition Characteristics: Fat loss, Muscle loss, Inadequate energy                  360 Statement: Moderate malnutrition r/t suspect acute and chronic medical conditions contributing as evidenced by PO intake <50% of estimated needs for 5 days, mild muscle loss around clavicles, deltoids, temples; mild buccal fat pad loss. Treatment to be determined pending further workup, family discussion, etc.    BMI Findings: Body mass index is 20.64 kg/m². Compression fracture of L5 vertebra with routine healing  Assessment & Plan  · Again noted on CT imaging worsened from prior examination, additionally with marked compression deformity at L4. Patient denying any increase in back pain from baseline  · OP neurosurgery evaluation unless pain worsens or develops neuro deficits.      Dysphagia  Assessment & Plan  · Speech eval appreciated-patient evaluated again by speech today for pleasure feed consideration, patient with ongoing aspiration and high risk, will continue on strict n.p.o. status while inpatient  · VBS shows no swallowing function  · Status post PEG tube placement -tube feeds currently at goal patient is tolerating    Chronic pain syndrome  Assessment & Plan  · Patient reporting no increase in chronic pain at this time  · PDMP reviewed, we will continue with low-dose oxycodone as needed      Rheumatoid arthritis involving multiple sites with positive rheumatoid factor (HCC)  Assessment & Plan  · Home dose Plaquenil 200 mg in a.m. and 100 mg in PM, methotrexate 7.5 mg every Thursday  · Continue home medications    Nausea and vomiting-resolved as of 2023  Assessment & Plan  · 3 days of nausea/vomiting per patient, CT abdomen/pelvis with concerns for right-sided pyelonephritis along with distention of upper esophagus with scattered degree in esophagus for which relation to reflux with esophageal dysfunction was not excluded  · Management of the pyelonephritis as above. Start IV Protonix for the potential reflux and assess response. · Appears to have resolved           VTE Pharmacologic Prophylaxis:   Pharmacologic: Heparin  Mechanical VTE Prophylaxis in Place: Yes    Patient Centered Rounds: I have performed bedside rounds with nursing staff today. Discussions with Specialists or Other Care Team Provider: cm    Education and Discussions with Family / Patient: plan of care, patient. Left a VM for . .     Time Spent for Care: 30 minutes. More than 50% of total time spent on counseling and coordination of care as described above. Current Length of Stay: 9 day(s)    Current Patient Status: Inpatient   Certification Statement: The patient will continue to require additional inpatient hospital stay due to pending abdomen [place] and rehab placement    Discharge Plan: rehab likely in 24-48 hours if bed availble and if pain is improving    Code Status: Level 3 - DNAR and DNI      Subjective:   No overnight events. No BM in last 2-3 days. Some abdominal discomfort. No nausea. Tolerating feeds otherwise. She is passing flatus.      Objective:     Vitals:   Temp (24hrs), Av °F (36.7 °C), Min:97.5 °F (36.4 °C), Max:98.3 °F (36.8 °C)    Temp:  [97.5 °F (36.4 °C)-98.3 °F (36.8 °C)] 98.3 °F (36.8 °C)  HR:  [87] 87  Resp:  [17-18] 17  BP: (128-130)/(64-98) 130/64  SpO2:  [97 %-99 %] 99 %  Body mass index is 20.64 kg/m². Input and Output Summary (last 24 hours): Intake/Output Summary (Last 24 hours) at 7/4/2023 1629  Last data filed at 7/4/2023 1314  Gross per 24 hour   Intake 0 ml   Output 1575 ml   Net -1575 ml       Physical Exam:     Physical Exam  Constitutional:       General: She is not in acute distress. Cardiovascular:      Rate and Rhythm: Normal rate and regular rhythm. Heart sounds: Normal heart sounds. No murmur heard. Pulmonary:      Effort: No respiratory distress. Breath sounds: Normal breath sounds. No wheezing or rales. Abdominal:      General: Bowel sounds are normal. There is no distension. Palpations: Abdomen is soft. Tenderness: There is abdominal tenderness. Comments: PEG site noted without surrounding  Erythema or signs if infection   Neurological:      Mental Status: She is alert. Comments: Awake alert and communicative  Moves all extremities           Additional Data:     Labs:    Results from last 7 days   Lab Units 07/03/23  0516 07/01/23  0719 06/30/23  0716   WBC Thousand/uL 8.66   < > 9.37   HEMOGLOBIN g/dL 10.4*   < > 11.4*   HEMATOCRIT % 32.8*   < > 36.1   PLATELETS Thousands/uL 497*   < > 384   BANDS PCT %  --   --  1   LYMPHO PCT %  --   --  5*   MONO PCT %  --   --  8   EOS PCT %  --   --  0    < > = values in this interval not displayed. Results from last 7 days   Lab Units 07/04/23  0451   SODIUM mmol/L 134*   POTASSIUM mmol/L 4.2   CHLORIDE mmol/L 102   CO2 mmol/L 30   BUN mg/dL 17   CREATININE mg/dL 0.46*   ANION GAP mmol/L 2   CALCIUM mg/dL 9.3   ALBUMIN g/dL 2.2*   TOTAL BILIRUBIN mg/dL 0.29   ALK PHOS U/L 103   ALT U/L 14   AST U/L 10   GLUCOSE RANDOM mg/dL 109         Results from last 7 days   Lab Units 06/29/23  1313   POC GLUCOSE mg/dl 64*                   * I Have Reviewed All Lab Data Listed Above. * Additional Pertinent Lab Tests Reviewed:  All Labs Within Last 24 Hours Reviewed    Imaging:    FL barium swallow video w speech   Final Result by TL LEYVA (06/27 1107)      CT chest abdomen pelvis w contrast   Final Result by Amanda Boggs MD (06/25 2005)      1. No acute pulmonary findings. 2.  New patulous distention of the upper esophagus. Scattered debris in the esophagus, question related to reflux with esophageal dysfunction not excluded. 3.  New findings suspicious for right-sided pyelonephritis. 4. Moderate compression deformity at L4 is new. Severe compression deformity at L5 is worse from the prior exam previously mild. 5.  Heterogeneous soft tissue fullness at the left upper chest wall adjacent to the left anterior first and second ribs. The overall fullness does appear similar to the prior noncontrast exam, question posttraumatic and related to old hematoma but given    the heterogeneity recommend follow-up contrast chest CT in 3 months. 6.  Bilateral shoulder prostheses identified with chronic bilateral anterior shoulder dislocation. The study was marked in [facility] for immediate notification. Workstation performed: [id number]         XR chest 1 view portable   Final Result by Nasim Curran MD (06/26 4957)      No acute cardiopulmonary disease. 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          Workstation performed: [id number]         XR abdomen 1 vw portable    (Results Pending)       Recent Cultures (last 7 days):           Last 24 Hours Medication List:   Current Facility-Administered Medications   Medication Dose Route Frequency Provider Last Rate   • bisacodyl  10 mg Rectal Daily PRN Ewelina Vizcaino MD     • Diclofenac Sodium  2 g Topical 4x Daily Repton ANGELA Ortiz     • folic acid  2 mg Per PEG Tube Daily Bassem Santillan DO     • gabapentin  200 mg Per PEG Tube TID Bassem Santillan DO     • heparin (porcine)  5,000 Units Subcutaneous [facility] Ramona Bailey DO     • HYDROmorphone 0.2 mg Intravenous Q4H PRN Mihaela Morgan MD     • hydroxychloroquine  200 mg Per PEG Tube Daily With Breakfast Bassem Santillan DO      And   • hydroxychloroquine  100 mg Per PEG Tube Daily With [employer], DO     • levothyroxine  75 mcg Per PEG Tube Early Morning Bassem Santillan, DO     • melatonin  3 mg Per PEG Tube HS Bassem Santillan, DO     • methocarbamol  500 mg Per PEG Tube Daily PRN Bassem Santillan, DO     • nortriptyline  100 mg Per PEG Tube HS Bassem Santillan DO     • ondansetron  4 mg Intravenous Q6H PRN Oscar Bhandari DO     • oxyCODONE  2.5 mg Per PEG Tube Q4H PRN Tima Parkinson PA-C     • pantoprazole  40 mg Intravenous Q24H [address] Hillary Roldan PA-C     • [START ON 7/5/2023] polyethylene glycol  17 g Per PEG Tube Daily Mihaela Morgan MD     • rOPINIRole  2 mg Per G Tube HS Bassem Santillan DO     • senna-docusate sodium  1 tablet Oral BID Mihaela Morgan MD          Today, Patient Was Seen By: Mihaela Morgan MD    ** Please Note: Dictation voice to text software may have been used in the creation of this document.  **

## 2023-07-04 NOTE — OCCUPATIONAL THERAPY NOTE
Occupational Therapy Evaluation     Patient Name: Ana Lou  Today's Date: 7/4/2023  Problem List  Principal Problem:    Generalized weakness  Active Problems:    Rheumatoid arthritis involving multiple sites with positive rheumatoid factor (HCC)    Chronic pain syndrome    Hyponatremia    Dysphagia    Compression fracture of L5 vertebra with routine healing    Protein calorie malnutrition (HCC)    Pyelonephritis    Nausea and vomiting    Hypokalemia    Bacteremia due to Escherichia coli    Moderate protein-calorie malnutrition (HCC)    Past Medical History  Past Medical History:   Diagnosis Date    Acute blood loss anemia 9/9/2020    Aftercare following joint replacement 9/9/2020    Patient had right reversed total shoulder arthroplasty.  Pain was controlled when he left the hospital.      Anxiety     Arthritis     Cellulitis of left lower extremity 11/30/2019    Depression     Disease of thyroid gland     HYPO    Dyspepsia 11/12/2019    Dysphagia 2/6/2023    Femur neck fracture (HCC)     GERD (gastroesophageal reflux disease)     Hyperthyroidism     RESOLVED: [id number]    Hyponatremia 7/25/2022    Leg pain 2/6/2023    Osteoporosis     Polio     PVD (peripheral vascular disease) ([address])     Right BBB/left ant fasc block     Shoulder pain, bilateral 7/27/2021    UTI (urinary tract infection)     Varicella infection     LAST ASSESSED: [id number]     Past Surgical History  Past Surgical History:   Procedure Laterality Date    APPENDECTOMY      HIP ARTHROPLASTY Left 11/27/2017    Procedure: REMOVAL IM NAIL CONVERSION TO TOTAL HIP ARTHROPLASTY POSTERIOR APPROACH;  Surgeon: Isela Duckworth MD;  Location:  MAIN OR;  Service: Orthopedics    HIP FRACTURE SURGERY      HIP SURGERY      both hips replaced;2013 left ,2014 right    JOINT REPLACEMENT Right     HIP TOTAL    OH ARTHROPLASTY GLENOHUMERAL JOINT TOTAL SHOULDER Right 9/2/2020    Procedure: ARTHROPLASTY SHOULDER REVERSE;  Surgeon: Levi Sawyer MD;  [facility];  Service: Orthopedics    AR ARTHROPLASTY GLENOHUMERAL JOINT TOTAL SHOULDER Left 6/1/2021    Procedure: ARTHROPLASTY SHOULDER REVERSE;  Surgeon: Naheed Levine MD;  [facility];  Service: Orthopedics    AR CONV PREV HIP TOT HIP ARTHRP W/WO AGRFT/ALGRFT Left 10/4/2017    Procedure: Simeon Drake removal of left hip;  Surgeon: Nicole Blackman MD;  [facility];  Service: Orthopedics    AR [address] WRST SURG W/RLS TRANSVRS CARPL LIGM Right 5/24/2022    Procedure: RELEASE CARPAL TUNNEL ENDOSCOPIC-right;  Surgeon: Meri Pak MD;  [facility];  Service: Orthopedics    REVISION TOTAL HIP ARTHROPLASTY Right     TOE AMPUTATION Left 7/24/2022    Procedure: 5TH METATARSAL RESECTION WITH BOTTOM FLAP CLOSURE;  Surgeon: Natacha Mao DPM;  [facility];  Service: Podiatry    TONSILLECTOMY             07/04/23 1115   OT Last Visit   OT Visit Date 07/04/23   Note Type   Note type Evaluation   Additional Comments Pt seen w/ PT to increase safety, decrease fall risk, and maximize functional/occupational performance 2* medical complexity which is a regression from pt's functional baseline. Pain Assessment   Pain Assessment Tool 0-10   Pain Score No Pain   Hospital Pain Intervention(s) Repositioned; Ambulation/increased activity; Emotional support   Restrictions/Precautions   Weight Bearing Precautions Per Order (S)    (chronic R shoulder dislocation/subluxation, per chart review, pt was WBAT on previosu admissions; per ortho, pt cleared for RW)   Other Precautions Chair Alarm; Bed Alarm;Multiple lines; Fall Risk;Pain  (feeding tube)   Home Living   Type of Home House  (2 SH with 2 VILMA)   Home Layout Two level;Performs ADLs on one level; Able to live on main level with bedroom/bathroom; Access   Bathroom Shower/Tub Tub/shower unit   [employer] Raised   [employer] Grab bars in shower;Grab bars around toilet; [address] Additional Comments Pt reports living in a 2  with 2 VILMA with her sister; reports access to first floor setup; uses RW for functional mobility   Prior Function   Level of [place] Needs assistance with ADLs; Needs assistance with IADLS; Independent with functional mobility   Lives With [facility] Help From Family;Personal care attendant   IADLs Family/Friend/Other provides transportation; Family/Friend/Other provides meals; Family/Friend/Other provides medication management   Falls in the last 6 months 0   Vocational Retired   Comments (-) driving; pt reports home health aides Monday-Friday for 5 hours/day   Lifestyle   Autonomy PTA, pt requires assistance w/ ADLs/IADLs and uses RW for functional mobility; (-) driving   Reciprocal Relationships Lives w/ her sister; home health aides Monday-Friday 5 hours/day   Service to Others Retired   [facility] Enjoys watching  shows   General   Family/Caregiver Present Yes   Additional General Comments Pt's granddaughter present during eval, supportive/interactive w/ pt   Subjective   Subjective "I was walking before."   ADL   Where Assessed Edge of bed   Eating Assistance 4  Minimal Assistance   Grooming Assistance 4  Minimal Assistance   UB Bathing Assistance 3  Moderate Assistance    [address] 2  Maximal [place] 3  Moderate Assistance   LB Dressing Assistance 2  Maximal [address]  2  Maximal Assistance   Functional Assistance 2  Maximal Assistance   Bed Mobility   Supine to Sit 2  Maximal assistance   Additional items Assist x 2; Increased time required;Verbal cues;LE management   Sit to Supine Unable to assess   Additional Comments Pt performed supine <> sit with Max A x2 for UB postural support/LE management; when sitting EOB, pt demonstrating heavy right lateral lean, requiring Mod A x1 to maintain midline/trunk control; @ end of session, pt left sitting upright in recliner with all functional needs in reach   Transfers   Sit to Stand 2  Maximal assistance   Additional items Assist x 2; Increased time required;Verbal cues   Stand to Sit 2  Maximal assistance   Additional items Assist x 2; Increased time required;Verbal cues   Sit pivot 2  Maximal assistance   Additional items Assist x 2; Increased time required;Verbal cues   Additional Comments Pt performed inital STS w/ Max A x2 w/ b/l HHA w/ b/l knee blocking however pt unable to achieve full upright posture, returning to seated position; pt performed sit pivot from EOB <> drop arm recliner w/ Max A x2   Functional Mobility   Additional Comments Not appropriate @ this time   Balance   Static Sitting Poor +   Dynamic Sitting Poor   Static Standing Poor -   Activity Tolerance   Activity Tolerance Patient limited by fatigue   Medical Staff Made Aware ESTELLA Alberts   Nurse Made Aware RN cleared   RUE Assessment   RUE Assessment X  (chronic R shoulder dislocation/subluxation, per chart review, pt was WBAT on previosu admissions; per ortho, pt cleared for RW - decreased AROM shoulder flexion. elblow ROM WFL; decreased  strength noted)   LUE Assessment   LUE Assessment WFL  (Decreased AROM shoulder flexion noted)   Hand Function   Gross Motor Coordination Impaired   Fine Motor Coordination Impaired   Hand Function Comments Decreased  strength in R hand   Cognition   Overall Cognitive Status WFL   Arousal/Participation Responsive;Arousable; Uncooperative   Attention Within functional limits   Orientation Level Oriented to person;Oriented to place;Oriented to time   Following Commands Follows one step commands with increased time or repetition   Comments Pt pleasant and cooperative   Assessment   Limitation Decreased ADL status; Decreased UE ROM; Decreased UE strength;Decreased endurance;Decreased self-care trans;Decreased high-level ADLs; Decreased fine motor control   Prognosis Fair   Assessment Pt is a 78 yo female who [facility], now s/p EGD and PEG tube. Pt dx w/ generalized weakness. Pt  has a past medical history of Acute blood loss anemia (9/9/2020), Aftercare following joint replacement (9/9/2020), Anxiety, Arthritis, Cellulitis of left lower extremity (11/30/2019), Depression, Disease of thyroid gland, Dyspepsia (11/12/2019), Dysphagia (2/6/2023), Femur neck fracture ([address]), GERD (gastroesophageal reflux disease), Hyperthyroidism, Hyponatremia (7/25/2022), Leg pain (2/6/2023), Osteoporosis, Polio, PVD (peripheral vascular disease) ([address]), Right BBB/left ant fasc block, Shoulder pain, bilateral (7/27/2021), UTI (urinary tract infection), and Varicella infection. Pt with active OT orders in which OT consulted to assess pt's functional status and occupational performance to determine safe d/c needs. Pt seen with PT to increase safety, decrease fall risk, and maximize functional/occupational performance 2* medical complexity which is a regression from pt's functional baseline. Pt lives with her sister in a 2  with 2 VILMA, with first floor setup. PTA, pt requires assistance w/ ADLs/IADLs and uses RW for functional mobility. (-) driving. Currently, pt performing bed mobility w/ Ma x A x2, functional transfers w/ Max A x2, UB ADLs w/ Mod A, and LB ADLs w/ Max A. Pt demonstrates the following limitations/impairments which impact the pt's ability to engage in valued occupations: functional ROM,  strength, balance, endurance/activity tolerance, standing tolerance, functional reach, postural/trunk control, strength, safety awareness, and pain. From an OT standpoint, recommend discharge to post-acute rehab once medically stable. The patient's raw score on the AM-PAC Daily Activity Inpatient Short Form is 14. A raw score of less than 19 suggests the patient may benefit from discharge to post-acute rehabilitation services. Please refer to the recommendation of the Occupational Therapist for safe discharge planning.  Pt would benefit from skilled OT services 2-3x/wk to address immediate acute care needs and underlying performance skills to promote safety, decrease fall risk, and enhance occupational performance to return to [facility]. Goals to be met within the next 10-14 days. Goals   Patient Goals To sit in the recliner   LTG Time Frame 10-14   Long Term Goal #1 See OT goals listed below   Plan   Treatment Interventions ADL retraining;Functional transfer training;UE strengthening/ROM; Endurance training;Patient/family training;Equipment evaluation/education; Fine motor coordination activities; Compensatory technique education;Continued evaluation; Energy conservation; Activityengagement   Goal Expiration Date 07/18/23   OT Frequency 2-3x/wk   Recommendation   OT Discharge Recommendation Post acute rehabilitation services   AM-PAC Daily Activity Inpatient   Lower Body Dressing 2   Bathing 2   Toileting 2   Upper Body Dressing 2   Grooming 3   Eating 3   Daily Activity Raw Score 14   Daily Activity Standardized Score (Calc for Raw Score >=11) 33.39   AM-PAC Applied Cognition Inpatient   Following a Speech/Presentation 3   Understanding Ordinary Conversation 3   Taking Medications 3   Remembering Where Things Are Placed or Put Away 3   Remembering List of 4-5 Errands 3   Taking Care of Complicated Tasks 3   Applied Cognition Raw Score 18   Applied Cognition Standardized Score 38.07   End of Consult   Education Provided Yes;Family or social support of family present for education by provider   Patient Position at End of Consult Bedside chair; All needs within reach   Nurse Communication Nurse aware of consult     OT GOALS:    OTR to see for functional mobility. Pt will improve activity tolerance/functional endurance during ADL/IADL/leisure tasks for at least 20 minutes to improve occupational performance and engagement in valued occupations using AE/DME prn.     Pt will engage in ongoing functional/formal cognitive assessments to assist with safe d/c planning and increase safety during functional tasks. Pt will be A/Ox4 100% of the time and follow at least 2 step commands during functional activities with no verbal cues to increase attention, safety, and engagement in ADL/IADL/leisure tasks. Pt will improve static/dynamic sitting balance for at least 15 minutes with Mod I during functional tasks to decrease fall risk and improve independence and engagement in ADL/IADL/leisure activities. Pt will improve dynamic standing balance for at least 8 minutes with Min A during functional tasks to decrease fall risk and improve independence and engagement in ADL/IADL/leisure activities. Pt will complete functional transfers on and off all surfaces used in daily routines with Min A for safety to maximize functional/occupational performance. Pt will complete all bed mobility tasks with Min A to serve as a prerequisite for EOB/OOB ADL/IADL/leisure tasks, optimize positioning/comfort, and increase functional independence. Pt will independently demonstrate good carryover of safety precautions and education/training during ADL/IADL/leisure tasks with energy conservation techniques s/p skilled instruction without verbal cues. Pt will increase UE/LE MMT strength by 1/2 grade to improve bilateral coordination in ADL/IADL/leisure tasks with S. Pt will complete UB ADL tasks with S using AE/DME prn to increase functional independence in ADL/IADL/leisure tasks. Pt will complete LB ADL tasks with Min A using AE/DME prn to increase functional independence in ADL/IADL/leisure tasks. Pt will complete toileting tasks with Min A and good hygiene/thoroughness using AE/DME prn to increase functional independence. Pt will independently identify and utilize 2-3 positive coping strategies to enhance overall wellbeing and engagement in valued occupations.     Scarlett Bailey, MS, OTR/L

## 2023-07-04 NOTE — PHYSICAL THERAPY NOTE
Physical Therapy Evaluation     Patient's Name: Meli Rivera    Admitting Diagnosis  Cachexia ([address]) Kg Drivers  Hypokalemia [E87.6]  Weakness [R53.1]  Pyelonephritis [N12]    Problem List  Patient Active Problem List   Diagnosis    Anxiety    Urinary tract infection without hematuria    RLS (restless legs syndrome)    S/P total hip arthroplasty    Hypothyroidism due to Hashimoto's thyroiditis    Age-related osteoporosis with current pathological fracture with routine healing    RBBB    Ambulatory dysfunction    Closed compression fracture of L3 lumbar vertebra    Rheumatoid arthritis involving multiple sites with positive rheumatoid factor (HCC)    Chronic pain syndrome    Vitamin D deficiency    Other forms of systemic lupus erythematosus ([address])    Depression    Shoulder dislocation    Rupture long head biceps tendon, left, initial encounter    S/P reverse total shoulder arthroplasty, left    Encounter for annual wellness exam in Medicare patient    Venous insufficiency    Hyponatremia    Anemia    Pressure injury of left foot, stage 4 (HCC)    Non-healing open wound of heel, initial encounter    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[address])    Dysphagia    Concern for cerebral contusion    Arm bruise, right, initial encounter    Effusion of right shoulder joint    Acute low back pain    Compression fracture of L5 vertebra with routine healing    Acute encephalopathy    Polypharmacy    Hypercalcemia    Weight loss    Protein calorie malnutrition (HCC)    Immunodeficiency due to drugs ([address])    Chronic venous hypertension (idiopathic) with ulcer of left lower extremity (CODE) ([address])    Diabetic ulcer of left midfoot associated with type 2 diabetes mellitus, with bone involvement without evidence of necrosis (HCC)    Other diseases of thymus ([address])    Generalized weakness    Acute cystitis with hematuria    Urinary retention    Hypomagnesemia    Pyelonephritis    Nausea and vomiting    Hypokalemia Bacteremia due to Escherichia coli    Moderate protein-calorie malnutrition [facility])       Past Medical History  Past Medical History:   Diagnosis Date    Acute blood loss anemia 9/9/2020    Aftercare following joint replacement 9/9/2020    Patient had right reversed total shoulder arthroplasty.  Pain was controlled when he left the hospital.      Anxiety     Arthritis     Cellulitis of left lower extremity 11/30/2019    Depression     Disease of thyroid gland     HYPO    Dyspepsia 11/12/2019    Dysphagia 2/6/2023    Femur neck fracture (HCC)     GERD (gastroesophageal reflux disease)     Hyperthyroidism     RESOLVED: [id number]    Hyponatremia 7/25/2022    Leg pain 2/6/2023    Osteoporosis     Polio     PVD (peripheral vascular disease) ([address])     Right BBB/left ant fasc block     Shoulder pain, bilateral 7/27/2021    UTI (urinary tract infection)     Varicella infection     LAST ASSESSED: [id number]       Past Surgical History  Past Surgical History:   Procedure Laterality Date    APPENDECTOMY      HIP ARTHROPLASTY Left 11/27/2017    Procedure: REMOVAL IM NAIL CONVERSION TO TOTAL HIP ARTHROPLASTY POSTERIOR APPROACH;  Surgeon: Loly Gudino MD;  [facility];  Service: Orthopedics    HIP FRACTURE SURGERY      HIP SURGERY      both hips replaced;2013 left ,2014 right    JOINT REPLACEMENT Right     HIP TOTAL    WI ARTHROPLASTY GLENOHUMERAL JOINT TOTAL SHOULDER Right 9/2/2020    Procedure: ARTHROPLASTY SHOULDER REVERSE;  Surgeon: Mandy Mcclellan MD;  [facility];  Service: Orthopedics    WI ARTHROPLASTY GLENOHUMERAL JOINT TOTAL SHOULDER Left 6/1/2021    Procedure: ARTHROPLASTY SHOULDER REVERSE;  Surgeon: Mandy Mcclellan MD;  [facility];  Service: Orthopedics    [place] CONV PREV HIP TOT HIP ARTHRP W/WO AGRFT/ALGRFT Left 10/4/2017    Procedure: Hareware removal of left hip;  Surgeon: Loly Gudino MD;  [facility];  Service: Orthopedics    [place] [id number] WRST SURG W/RLS TRANSVRS CARPL LIGM Right 5/24/2022    Procedure: RELEASE CARPAL TUNNEL ENDOSCOPIC-right;  Surgeon: Sierra Vo MD;  [facility];  Service: Orthopedics    REVISION TOTAL HIP ARTHROPLASTY Right     TOE AMPUTATION Left 7/24/2022    Procedure: 5TH METATARSAL RESECTION WITH BOTTOM FLAP CLOSURE;  Surgeon: Cami Bruno DPM;  [facility];  Service: Podiatry    TONSILLECTOMY          07/04/23 1114   PT Last Visit   PT Visit Date 07/04/23   Note Type   Note type Evaluation   Pain Assessment   Pain Assessment Tool 0-10   Pain Score No Pain   Restrictions/Precautions   Weight Bearing Precautions Per Order ((chronic R shoulder dislocation/subluxation- was WBAT on prior admitions per ortho and can use RW))   Other Precautions Chair Alarm; Bed Alarm;Multiple lines; Fall Risk;Pain   Home Living   Type of [address] [place] [address] Dr Two level;Performs ADLs on one level; Able to live on main level with bedroom/bathroom  (2 VILMA)   Bathroom Shower/Tub Tub/shower unit   [employer] Raised   Bathroom Equipment Grab bars in shower; Shower chair;Grab bars around toilet   [place]  (was using PTA)   Prior Function   Level of [place] Independent with functional mobility; Needs assistance with ADLs; Needs assistance with IADLS   Lives With Family  (sister)   Receives Help From Family;Personal care attendant  (aides M-F (5hrs/day))   IADLs Family/Friend/Other provides transportation; Family/Friend/Other provides meals; Family/Friend/Other provides medication management   Falls in the last 6 months 0   Vocational Retired   Cognition   Arousal/Participation Cooperative   Orientation Level Oriented to person;Oriented to place;Oriented to time   Following Commands Follows one step commands with increased time or repetition   Comments pt pleasant and cooperative, expresses fear of falling   RLE Assessment   RLE Assessment   (functionally 2+/5)   LLE Assessment   LLE Assessment   (functionally 2+/5)   Bed Mobility   Supine to Sit 2 Maximal assistance   Additional items Assist x 2; Increased time required;Verbal cues;LE management   Sit to Supine Unable to assess   Additional Comments pt supine in bed upon arrival. pt left sitting OOB in recliner with all needs withn reach   Transfers   Sit to Stand 2  Maximal assistance   Additional items Assist x 2; Increased time required;Verbal cues   Stand to Sit 2  Maximal assistance   Additional items Assist x 2; Increased time required;Verbal cues   Sit pivot 2  Maximal assistance   Additional items Assist x 2; Increased time required;Verbal cues   Additional Comments transfers with b/l HHA, able to achieve ~50% full stand. Pt then completed sit pivot to drop arm recliner with therapist anteriorly/ posteriorly   Ambulation/Elevation   Gait pattern Not appropriate   Balance   Static Sitting Fair -   Dynamic Sitting Poor +   Static Standing Poor -   Endurance Deficit   Endurance Deficit Yes   Endurance Deficit Description generalized weaknes, deconditioning   Activity Tolerance   Activity Tolerance Patient limited by fatigue   Medical Staff Made Aware PHYLLIS Ziegler; co-sheldon performed this date 2* increased medical complexity and multiple co-morbidities   Nurse Made Aware RN cleared pt to participate in therapy session   Assessment   Prognosis Fair   Problem List Decreased strength;Decreased range of motion;Decreased endurance; Impaired balance;Decreased coordination;Decreased mobility;Pain   Assessment Pt is a 77 y.o. female seen for PT evaluation s/p admit to [address] [place] [address] on 6/25/2023. Pt was admitted with a primary dx of: generalized weakness s/p EGD +PEG tube placement. PT now consulted for assessment of mobility and d/c needs. Pt with Up and OOB as tolerated  orders. Pts current comorbidities effecting treatment include: RA, chronic pain, hyponatremia, dysphagia, compression fx of L5, malnutrition, hypokalemia, bacteremia.  Pts current clinical presentation is Unstable/ Unpredictable (high complexity) due to Ongoing medical management for primary dx, Increased reliance on more restrictive AD compared to baseline, Decreased activity tolerance compared to baseline, Fall risk, Increased assistance needed from caregiver at current time, Trending lab values, Continuous pulse oximetry monitoring , s/p surgical intervention. Prior to admission, pt was residing with sister in [address] [place] [address] with 2 VILMA and was using RW, requiring A with ADLs/ IADLs with ADLs/ IADLs. Upon evaluation, pt currently is requiring max Ax2 for bed mobility; max Ax2 for transfers. Pt presents at PT eval functioning below baseline and currently w/ overall mobility deficits 2* to: BLE weakness, decreased ROM, impaired balance, decreased endurance, impaired coordination, gait deviations, decreased activity tolerance compared to baseline, decreased functional mobility tolerance compared to baseline, fall risk, spinal precautions. Pt currently at a fall risk 2* to impairments listed above. Pt will continue to benefit from skilled acute PT interventions to address stated impairments; to maximize functional mobility; for ongoing pt/ family training; and DME needs. At conclusion of PT session pt returned back in chair and chair alarm engaged with phone and call bell within reach. Pt denies any further questions at this time. The patient's AM-PAC Basic Mobility Inpatient Short Form Raw Score is 7. A Raw score of less than or equal to 16 suggests the patient may benefit from discharge to post-acute rehabilitation services. Please also refer to the recommendation of the Physical Therapist for safe discharge planning. Recommend rehab upon hospital D/C. Barriers to Discharge Inaccessible home environment   Goals   Patient Goals to get OOB   STG Expiration Date 07/18/23   Short Term Goal #1 STG 1. Pt will be able to perform bed mobility tasks with min A in order to improve overall functional mobility and assist in safe d/c. STG 2.  Pt with sit EOB for at least 25 minutes at S level in order to strengthen abdominal musculature and assist in future transfers/ ambulation. STG 3. Pt will be able to perform functional transfer with min A in order to improve overall functional mobility and assist in safe d/c. STG 4. Pt will be able to ambulate at least 15 feet with least restrictive device with min A in order to improve overall functional mobility and assist in safe d/c. STG 5. Pt will improve sitting/standing static/dynamic balance 1/2 grade in order to improve functional mobility and assist in safe d/c. STG 6. Pt will improve LE strength by 1/2 grade in order to improve functional mobility and assist in safe d/c.   PT Treatment Day 0   Plan   Treatment/Interventions Functional transfer training;LE strengthening/ROM; Therapeutic exercise; Endurance training;Patient/family training;Equipment eval/education; Bed mobility;Gait training;Spoke to nursing;Spoke to case management;OT   PT Frequency 2-3x/wk   Recommendation   PT Discharge Recommendation Post acute rehabilitation services   AM-PAC Basic Mobility Inpatient   Turning in Flat Bed Without Bedrails 2   Lying on Back to Sitting on Edge of Flat Bed Without Bedrails 1   Moving Bed to Chair 1   Standing Up From Chair Using Arms 1   Walk in Room 1   Climb 3-5 Stairs With Railing 1   Basic Mobility Inpatient Raw Score 7   Highest Level Of Mobility   JH-HLM Goal 2: Bed activities/Dependent transfer   JH-HLM Achieved 4: Move to chair/commode   Modified Cameron Scale   Modified Cari Scale 4           Cal Marie PT DPT

## 2023-07-04 NOTE — ASSESSMENT & PLAN NOTE
· Speech eval appreciated-patient evaluated again by speech today for pleasure feed consideration, patient with ongoing aspiration and high risk, will continue on strict n.p.o. status while inpatient  · VBS shows no swallowing function  · Status post PEG tube placement -tube feeds currently at goal patient is tolerating

## 2023-07-04 NOTE — ASSESSMENT & PLAN NOTE
· Again noted on CT imaging worsened from prior examination, additionally with marked compression deformity at L4. Patient denying any increase in back pain from baseline  · OP neurosurgery evaluation unless pain worsens or develops neuro deficits.

## 2023-07-04 NOTE — PLAN OF CARE
Problem: Potential for Falls  Goal: Patient will remain free of falls  Description: INTERVENTIONS:  - Educate patient/family on patient safety including physical limitations  - Instruct patient to call for assistance with activity   - Consult OT/PT to assist with strengthening/mobility   - Keep Call bell within reach  - Keep bed low and locked with side rails adjusted as appropriate  - Keep care items and personal belongings within reach  - Initiate and maintain comfort rounds  - Make Fall Risk Sign visible to staff  - Offer Toileting every 2 Hours, in advance of need  - Initiate/Maintain alarm  - Obtain necessary fall risk management equipment  - Apply yellow socks and bracelet for high fall risk patients  - Consider moving patient to room near nurses station  Outcome: Progressing     Problem: MOBILITY - ADULT  Goal: Maintain or return to baseline ADL function  Description: INTERVENTIONS:  -  Assess patient's ability to carry out ADLs; assess patient's baseline for ADL function and identify physical deficits which impact ability to perform ADLs (bathing, care of mouth/teeth, toileting, grooming, dressing, etc.)  - Assess/evaluate cause of self-care deficits   - Assess range of motion  - Assess patient's mobility; develop plan if impaired  - Assess patient's need for assistive devices and provide as appropriate  - Encourage maximum independence but intervene and supervise when necessary  - Involve family in performance of ADLs  - Assess for home care needs following discharge   - Consider OT consult to assist with ADL evaluation and planning for discharge  - Provide patient education as appropriate  Outcome: Progressing  Goal: Maintains/Returns to pre admission functional level  Description: INTERVENTIONS:  - Perform BMAT or MOVE assessment daily.   - Set and communicate daily mobility goal to care team and patient/family/caregiver.    - Collaborate with rehabilitation services on mobility goals if consulted  - Record patient progress and toleration of activity level   Outcome: Progressing     Problem: Prexisting or High Potential for Compromised Skin Integrity  Goal: Skin integrity is maintained or improved  Description: INTERVENTIONS:  - Identify patients at risk for skin breakdown  - Assess and monitor skin integrity  - Assess and monitor nutrition and hydration status  - Monitor labs   - Assess for incontinence   - Turn and reposition patient  - Assist with mobility/ambulation  - Relieve pressure over bony prominences  - Avoid friction and shearing  - Provide appropriate hygiene as needed including keeping skin clean and dry  - Evaluate need for skin moisturizer/barrier cream  - Collaborate with interdisciplinary team   - Patient/family teaching  - Consider wound care consult   Outcome: Progressing     Problem: Nutrition/Hydration-ADULT  Goal: Nutrient/Hydration intake appropriate for improving, restoring or maintaining nutritional needs  Description: Monitor and assess patient's nutrition/hydration status for malnutrition. Collaborate with interdisciplinary team and initiate plan and interventions as ordered. Monitor patient's weight and dietary intake as ordered or per policy. Utilize nutrition screening tool and intervene as necessary. Determine patient's food preferences and provide high-protein, high-caloric foods as appropriate.      INTERVENTIONS:  - Monitor oral intake, urinary output, labs, and treatment plans  - Assess nutrition and hydration status and recommend course of action  - Evaluate amount of meals eaten  - Assist patient with eating if necessary   - Allow adequate time for meals  - Recommend/ encourage appropriate diets, oral nutritional supplements, and vitamin/mineral supplements  - Order, calculate, and assess calorie counts as needed  - Recommend, monitor, and adjust tube feedings and TPN/PPN based on assessed needs  - Assess need for intravenous fluids  - Provide specific nutrition/hydration education as appropriate  - Include patient/family/caregiver in decisions related to nutrition  Outcome: Progressing     Problem: PAIN - ADULT  Goal: Verbalizes/displays adequate comfort level or baseline comfort level  Description: Interventions:  - Encourage patient to monitor pain and request assistance  - Assess pain using appropriate pain scale  - Administer analgesics based on type and severity of pain and evaluate response  - Implement non-pharmacological measures as appropriate and evaluate response  - Consider cultural and social influences on pain and pain management  - Notify physician/advanced practitioner if interventions unsuccessful or patient reports new pain  Outcome: Progressing     Problem: INFECTION - ADULT  Goal: Absence or prevention of progression during hospitalization  Description: INTERVENTIONS:  - Assess and monitor for signs and symptoms of infection  - Monitor lab/diagnostic results  - Monitor all insertion sites, i.e. indwelling lines, tubes, and drains  - Monitor endotracheal if appropriate and nasal secretions for changes in amount and color  - Sherman appropriate cooling/warming therapies per order  - Administer medications as ordered  - Instruct and encourage patient and family to use good hand hygiene technique  - Identify and instruct in appropriate isolation precautions for identified infection/condition  Outcome: Progressing  Goal: Absence of fever/infection during neutropenic period  Description: INTERVENTIONS:  - Monitor WBC    Outcome: Progressing     Problem: DISCHARGE PLANNING  Goal: Discharge to home or other facility with appropriate resources  Description: INTERVENTIONS:  - Identify barriers to discharge w/patient and caregiver  - Arrange for needed discharge resources and transportation as appropriate  - Identify discharge learning needs (meds, wound care, etc.)  - Arrange for interpretive services to assist at discharge as needed  - Refer to Case Management Department for coordinating discharge planning if the patient needs post-hospital services based on physician/advanced practitioner order or complex needs related to functional status, cognitive ability, or social support system  Outcome: Progressing     Problem: Knowledge Deficit  Goal: Patient/family/caregiver demonstrates understanding of disease process, treatment plan, medications, and discharge instructions  Description: Complete learning assessment and assess knowledge base.   Interventions:  - Provide teaching at level of understanding  - Provide teaching via preferred learning methods  Outcome: Progressing

## 2023-07-04 NOTE — ASSESSMENT & PLAN NOTE
· Was treated for acute cystitis during recent admission, CT ab/pelvis now with findings suspicious for right pyelonephritis. Patient without complaints of right side abdominal/flank pain and with benign examination. UA with WBCs and bacteria.   Elevated procalcitonin and WBC is noted  · completed abx course,  · Trend WC, temperature curve, hemodynamics

## 2023-07-04 NOTE — CASE MANAGEMENT
Case Management Discharge Planning Note    Patient name Juanito Colindres  Location [facility] 926/[facility] 926-01 MRN [id number]  : 1945 Date 2023       Current Admission Date: 2023  Current Admission Diagnosis:Generalized weakness   Patient Active Problem List    Diagnosis Date Noted   • Moderate protein-calorie malnutrition ([address]) 2023   • Bacteremia due to Escherichia coli 2023   • Pyelonephritis 2023   • Nausea and vomiting 2023   • Hypokalemia 2023   • Hypomagnesemia 2023   • Generalized weakness 2023   • Acute cystitis with hematuria 2023   • Urinary retention 2023   • Immunodeficiency due to drugs ([address]) 2023   • Chronic venous hypertension (idiopathic) with ulcer of left lower extremity (CODE) ([address]) 2023   • Diabetic ulcer of left midfoot associated with type 2 diabetes mellitus, with bone involvement without evidence of necrosis ([address]) 2023   • Other diseases of thymus ([address]) 2023   • Protein calorie malnutrition ([address]) 2023   • Weight loss 2023   • Acute encephalopathy 2023   • Polypharmacy 2023   • Hypercalcemia 2023   • Compression fracture of L5 vertebra with routine healing 2023   • Acute low back pain 2023   • Effusion of right shoulder joint 2023   • Arm bruise, right, initial encounter 2023   • Concern for cerebral contusion 2023   • Dysphagia 2023   • 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   • Non-healing open wound of heel, initial encounter 10/12/2022   • Anemia 2022   • Pressure injury of left foot, stage 4 ([address])    • Hyponatremia 2022   • Venous insufficiency 2021   • Encounter for annual wellness exam in Medicare patient 2021   • S/P reverse total shoulder arthroplasty, left 2021   • Rupture long head biceps tendon, left, initial encounter 2021   • Shoulder dislocation 2021 • Depression 09/09/2020   • Other forms of systemic lupus erythematosus ([address]) 08/31/2020   • Vitamin D deficiency 07/24/2019   • Chronic pain syndrome 04/18/2019   • Rheumatoid arthritis involving multiple sites with positive rheumatoid factor ([address]) 03/04/2019   • Ambulatory dysfunction 11/13/2018   • Closed compression fracture of L3 lumbar vertebra 11/13/2018   • S/P total hip arthroplasty 11/27/2017   • Anxiety 08/30/2017   • Urinary tract infection without hematuria 08/30/2017   • RLS (restless legs syndrome) 08/30/2017   • RBBB 08/23/2017   • Age-related osteoporosis with current pathological fracture with routine healing 04/18/2017   • Hypothyroidism due to Hashimoto's thyroiditis 12/13/2016      LOS (days): 9  Geometric Mean LOS (GMLOS) (days): 2.80  Days to GMLOS:-5.7     OBJECTIVE:  Risk of Unplanned Readmission Score: 39.93         Current admission status: Inpatient   Preferred Pharmacy:   [address] [place] [address], [address] [place] [address]  Phone: [phone] Fax: [address], [address] [place] [address]  Phone: [phone] Fax: [phone]    Primary Care Provider: Trudi Barnett MD    Primary Insurance: MEDICARE  Secondary Insurance:     DISCHARGE DETAILS:       Additional Comments: Pt to be seen by PT/OT for rehab recommendations prior to discharge planning. CM to follow.

## 2023-07-04 NOTE — PLAN OF CARE
Problem: OCCUPATIONAL THERAPY ADULT  Goal: Performs self-care activities at highest level of function for planned discharge setting. See evaluation for individualized goals. Description: Treatment Interventions: ADL retraining, Functional transfer training, UE strengthening/ROM, Endurance training, Patient/family training, Equipment evaluation/education, Fine motor coordination activities, Compensatory technique education, Continued evaluation, Energy conservation, Activityengagement          See flowsheet documentation for full assessment, interventions and recommendations. Note: Limitation: Decreased ADL status, Decreased UE ROM, Decreased UE strength, Decreased endurance, Decreased self-care trans, Decreased high-level ADLs, Decreased fine motor control  Prognosis: Fair  Assessment: Pt is a 78 yo female who presented to [facility] with generalized weakness, now s/p EGD and PEG tube. Pt dx w/ generalized weakness. Pt  has a past medical history of Acute blood loss anemia (9/9/2020), Aftercare following joint replacement (9/9/2020), Anxiety, Arthritis, Cellulitis of left lower extremity (11/30/2019), Depression, Disease of thyroid gland, Dyspepsia (11/12/2019), Dysphagia (2/6/2023), Femur neck fracture ([address]), GERD (gastroesophageal reflux disease), Hyperthyroidism, Hyponatremia (7/25/2022), Leg pain (2/6/2023), Osteoporosis, Polio, PVD (peripheral vascular disease) ([address]), Right BBB/left ant fasc block, Shoulder pain, bilateral (7/27/2021), UTI (urinary tract infection), and Varicella infection. Pt with active OT orders in which OT consulted to assess pt's functional status and occupational performance to determine safe d/c needs. Pt seen with PT to increase safety, decrease fall risk, and maximize functional/occupational performance 2* medical complexity which is a regression from pt's functional baseline. Pt lives with her sister in a 2  with 2 VILMA, with first floor setup.  PTA, pt requires assistance w/ ADLs/IADLs and uses RW for functional mobility. (-) driving. Currently, pt performing bed mobility w/ Ma x A x2, functional transfers w/ Max A x2, UB ADLs w/ Mod A, and LB ADLs w/ Max A. Pt demonstrates the following limitations/impairments which impact the pt's ability to engage in valued occupations: functional ROM,  strength, balance, endurance/activity tolerance, standing tolerance, functional reach, postural/trunk control, strength, safety awareness, and pain. From an OT standpoint, recommend discharge to post-acute rehab once medically stable. The patient's raw score on the -PAC Daily Activity Inpatient Short Form is 14. A raw score of less than 19 suggests the patient may benefit from discharge to post-acute rehabilitation services. Please refer to the recommendation of the Occupational Therapist for safe discharge planning. Pt would benefit from skilled OT services 2-3x/wk to address immediate acute care needs and underlying performance skills to promote safety, decrease fall risk, and enhance occupational performance to return to PLOF. Goals to be met within the next 10-14 days.      OT Discharge Recommendation: Post acute rehabilitation services

## 2023-07-05 PROBLEM — S32.049A L4 VERTEBRAL FRACTURE (HCC): Status: ACTIVE | Noted: 2023-07-05

## 2023-07-05 PROBLEM — R10.9 ABDOMINAL PAIN: Status: RESOLVED | Noted: 2023-07-04 | Resolved: 2023-07-05

## 2023-07-05 PROCEDURE — 99232 SBSQ HOSP IP/OBS MODERATE 35: CPT | Performed by: INTERNAL MEDICINE

## 2023-07-05 PROCEDURE — C9113 INJ PANTOPRAZOLE SODIUM, VIA: HCPCS | Performed by: PHYSICIAN ASSISTANT

## 2023-07-05 PROCEDURE — 99223 1ST HOSP IP/OBS HIGH 75: CPT | Performed by: PHYSICIAN ASSISTANT

## 2023-07-05 RX ORDER — LIDOCAINE 50 MG/G
1 PATCH TOPICAL DAILY
Status: DISCONTINUED | OUTPATIENT
Start: 2023-07-05 | End: 2023-07-08 | Stop reason: HOSPADM

## 2023-07-05 RX ORDER — BISACODYL 10 MG
10 SUPPOSITORY, RECTAL RECTAL ONCE
Status: DISCONTINUED | OUTPATIENT
Start: 2023-07-05 | End: 2023-07-05

## 2023-07-05 RX ADMIN — METHOCARBAMOL TABLETS 500 MG: 500 TABLET, COATED ORAL at 07:37

## 2023-07-05 RX ADMIN — DICLOFENAC SODIUM 2 G: 10 GEL TOPICAL at 21:13

## 2023-07-05 RX ADMIN — POLYETHYLENE GLYCOL 3350 17 G: 17 POWDER, FOR SOLUTION ORAL at 07:39

## 2023-07-05 RX ADMIN — SENNOSIDES AND DOCUSATE SODIUM 1 TABLET: 50; 8.6 TABLET ORAL at 07:38

## 2023-07-05 RX ADMIN — ROPINIROLE 2 MG: 2 TABLET, FILM COATED ORAL at 21:00

## 2023-07-05 RX ADMIN — GABAPENTIN 200 MG: 100 CAPSULE ORAL at 07:38

## 2023-07-05 RX ADMIN — FOLIC ACID 2 MG: 1 TABLET ORAL at 07:38

## 2023-07-05 RX ADMIN — DICLOFENAC SODIUM 2 G: 10 GEL TOPICAL at 11:18

## 2023-07-05 RX ADMIN — LEVOTHYROXINE SODIUM 75 MCG: 75 TABLET ORAL at 05:36

## 2023-07-05 RX ADMIN — GABAPENTIN 200 MG: 100 CAPSULE ORAL at 16:09

## 2023-07-05 RX ADMIN — DICLOFENAC SODIUM 2 G: 10 GEL TOPICAL at 16:05

## 2023-07-05 RX ADMIN — Medication 2.5 MG: at 14:04

## 2023-07-05 RX ADMIN — SENNOSIDES AND DOCUSATE SODIUM 1 TABLET: 50; 8.6 TABLET ORAL at 16:10

## 2023-07-05 RX ADMIN — HYDROXYCHLOROQUINE SULFATE 100 MG: 200 TABLET ORAL at 16:03

## 2023-07-05 RX ADMIN — HYDROXYCHLOROQUINE SULFATE 200 MG: 200 TABLET ORAL at 07:36

## 2023-07-05 RX ADMIN — MELATONIN TAB 3 MG 3 MG: 3 TAB at 21:00

## 2023-07-05 RX ADMIN — Medication 2.5 MG: at 07:37

## 2023-07-05 RX ADMIN — LIDOCAINE 5% 1 PATCH: 700 PATCH TOPICAL at 16:00

## 2023-07-05 RX ADMIN — HEPARIN SODIUM 5000 UNITS: 5000 INJECTION INTRAVENOUS; SUBCUTANEOUS at 12:31

## 2023-07-05 RX ADMIN — HYDROMORPHONE HYDROCHLORIDE 0.2 MG: 0.2 INJECTION, SOLUTION INTRAMUSCULAR; INTRAVENOUS; SUBCUTANEOUS at 16:01

## 2023-07-05 RX ADMIN — PANTOPRAZOLE SODIUM 40 MG: 40 INJECTION, POWDER, FOR SOLUTION INTRAVENOUS at 07:40

## 2023-07-05 RX ADMIN — HEPARIN SODIUM 5000 UNITS: 5000 INJECTION INTRAVENOUS; SUBCUTANEOUS at 05:35

## 2023-07-05 RX ADMIN — HEPARIN SODIUM 5000 UNITS: 5000 INJECTION INTRAVENOUS; SUBCUTANEOUS at 21:00

## 2023-07-05 RX ADMIN — NORTRIPTYLINE HYDROCHLORIDE 100 MG: 50 CAPSULE ORAL at 21:00

## 2023-07-05 RX ADMIN — GABAPENTIN 200 MG: 100 CAPSULE ORAL at 21:00

## 2023-07-05 NOTE — ASSESSMENT & PLAN NOTE
Malnutrition Findings:   Adult Malnutrition type:  (suspect acute and chronic components)  Adult Degree of Malnutrition: Malnutrition of moderate degree  Malnutrition Characteristics: Fat loss, Muscle loss, Inadequate energy                  360 Statement: Moderate malnutrition r/t suspect acute and chronic medical conditions contributing as evidenced by PO intake <50% of estimated needs for 5 days, mild muscle loss around clavicles, deltoids, temples; mild buccal fat pad loss. Treatment to be determined pending further workup, family discussion, etc.    BMI Findings: Body mass index is 15.31 kg/m².

## 2023-07-05 NOTE — CONSULTS
[address] [place] [address]  Consult  Name: Virginia Kawasaki 77 y.o. female I MRN: [id number]  Unit/Bed#: [facility] 926-01 I Date of Admission: 6/25/2023   Date of Service: 7/5/2023 I Hospital Day: 10    Inpatient consult to Neurosurgery  Consult performed by: John Doan PA-C  Consult ordered by: Michael Garcia MD          Assessment/Plan   L4 vertebral fracture [facility])  Assessment & Plan  New moderate L4 fracture, progressive L5 fracture  • Stable severe fracture at L3  • Mild retropulsion at L3 and L4 with canal stenosis    Imaging:   • CT chest abdomen pelvis with 6/25/23: Moderate compression fracture at L4 is new. Severe compression deformity at L5 is worse from prior exam.    Plan:   • Continue to monitor neurological exam  • CT results reviewed with patient. Though there is a new fracture, pedicles are intact. • If pain is severe and limiting mobility, discussed consideration for kyphoplasty but unfortunately this is not a an option for this patient at this time secondary to bacteremia and infection. • Discussed option for bracing. Discussed concerns given recent PEG tube placement. Bracing deferred at this time but may be ordered as needed. • Order baseline upright x-rays if patient is able to stand for imaging. If not can consider sitting x-rays. • No neurosurgical intervention anticipated. • Pain control per primary team.    o Consider adding Tylenol 925 mg every 8 hours. • Okay to mobilize with physical and Occupational Therapy. • DVT prophylaxis: Bilateral SCDs. Subcutaneous heparin. We will follow from periphery and review x-rays after completion. Call with other questions or concerns.       Pyelonephritis  Assessment & Plan  Management per primary team    Protein calorie malnutrition [facility])  Assessment & Plan  Malnutrition Findings:   Adult Malnutrition type:  (suspect acute and chronic components)  Adult Degree of Malnutrition: Malnutrition of moderate degree  Malnutrition Characteristics: Fat loss, Muscle loss, Inadequate energy                 Recent PEG placement 6/28      360 Statement: Moderate malnutrition r/t suspect acute and chronic medical conditions contributing as evidenced by PO intake <50% of estimated needs for 5 days, mild muscle loss around clavicles, deltoids, temples; mild buccal fat pad loss. Treatment to be determined pending further workup, family discussion, etc.    BMI Findings: Body mass index is 15.31 kg/m². Compression fracture of L5 vertebra with routine healing  Assessment & Plan  Mild progression. No intervention. Pain control per primary team            History of Present Illness     HPI: Scott Lemus is a 77 y.o. female with PMH including osteoporosis, rheumatoid arthritis on Plaquenil and methotrexate, peripheral vascular disease, history of polio, hypothyroidism, depression, dysphagia and Pepcid who presents with complaint of generalized weakness. Patient is admitted early June with generalized weakness found to have acute cystitis and completed a course of antibiotics ultimately discharged home after she declined rehab. She had a 3-day episode of nonbloody nonbilious nausea and vomiting difficulty tolerating oral intake and intermittent episodes of watery diarrhea. She denies any fevers or chills, chest pain, shortness of breath. Patient is complaining of several weeks of acute back pain. She recalls getting out of the recliner but no specific falls or trauma. She has a history of been L3 and L5 fractures. CT imaging on this admission demonstrated an acute L4 fracture with progressive L5 compression. Patient recently treated for polynephritis and bacteremia due to E. Coli. She is high aspiration risk and is on strict n.p.o. status now status post PEG tube placement. Neurosurgical consult requested secondary to lumbar fractures.       Review of Systems   Constitutional: Positive for appetite change and unexpected weight change. Negative for fatigue and fever. HENT: Negative for trouble swallowing and voice change. Eyes: Negative for photophobia and visual disturbance. Respiratory: Negative for cough and shortness of breath. Cardiovascular: Negative for chest pain and leg swelling. Gastrointestinal: Positive for diarrhea, nausea and vomiting. Negative for abdominal pain. Genitourinary: Negative for decreased urine volume and difficulty urinating. Musculoskeletal: Positive for back pain. Negative for gait problem and neck pain. Skin: Negative for pallor, rash and wound. Neurological: Positive for weakness. Negative for dizziness, tremors, seizures, speech difficulty, light-headedness, numbness and headaches. Psychiatric/Behavioral: Negative for agitation and confusion. Historical Information   Past Medical History:   Diagnosis Date   • Acute blood loss anemia 9/9/2020   • Aftercare following joint replacement 9/9/2020    Patient had right reversed total shoulder arthroplasty.  Pain was controlled when he left the hospital.     • Anxiety    • Arthritis    • Cellulitis of left lower extremity 11/30/2019   • Depression    • Disease of thyroid gland     HYPO   • Dyspepsia 11/12/2019   • Dysphagia 2/6/2023   • Femur neck fracture ([facility])    • GERD (gastroesophageal reflux disease)    • Hyperthyroidism     RESOLVED: [id number]   • Hyponatremia 7/25/2022   • Leg pain 2/6/2023   • Osteoporosis    • Polio    • PVD (peripheral vascular disease) (H[facility])    • Right BBB/left ant fasc block    • Shoulder pain, bilateral 7/27/2021   • UTI (urinary tract infection)    • Varicella infection     LAST ASSESSED: [id number]     Past Surgical History:   Procedure Laterality Date   • APPENDECTOMY     • HIP ARTHROPLASTY Left 11/27/2017    Procedure: REMOVAL IM NAIL CONVERSION TO TOTAL HIP ARTHROPLASTY POSTERIOR APPROACH;  Surgeon: Gage Terry MD;  Location: [facility];  Service: Orthopedics   • HIP FRACTURE SURGERY • HIP SURGERY      both hips replaced;2013 left ,2014 right   • JOINT REPLACEMENT Right     HIP TOTAL   • AL ARTHROPLASTY GLENOHUMERAL JOINT TOTAL SHOULDER Right 9/2/2020    Procedure: ARTHROPLASTY SHOULDER REVERSE;  Surgeon: Christine Rose MD;  [facility];  Service: Orthopedics   • AL ARTHROPLASTY GLENOHUMERAL JOINT TOTAL SHOULDER Left 6/1/2021    Procedure: ARTHROPLASTY SHOULDER REVERSE;  Surgeon: Christine Rose MD;  [facility];  Service: Orthopedics   • AL CONV PREV HIP TOT HIP ARTHRP W/WO AGRFT/ALGRFT Left 10/4/2017    Procedure: [place] removal of left hip;  Surgeon: Tracey Ashley MD;  Location: [facility];  Service: Orthopedics   • AL [id number] WRST SURG W/RLS TRANSVRS CARPL LIGM Right 5/24/2022    Procedure: RELEASE CARPAL TUNNEL ENDOSCOPIC-right;  Surgeon: Jurgen Martines MD;  Location: [facility];  Service: Orthopedics   • REVISION TOTAL HIP ARTHROPLASTY Right    • TOE AMPUTATION Left 7/24/2022    Procedure: 5TH METATARSAL RESECTION WITH BOTTOM FLAP CLOSURE;  Surgeon: Elton Duran DPM;  Location: [facility];  Service: Podiatry   • TONSILLECTOMY       Social History     Substance and Sexual Activity   Alcohol Use Not Currently     Social History     Substance and Sexual Activity   Drug Use Not Currently     Social History     Tobacco Use   Smoking Status Former   Smokeless Tobacco Never   Tobacco Comments    quit 20-30 years ago     Family History   Problem Relation Age of Onset   • Rheum arthritis Mother    • Rheum arthritis Sister    • Prostate cancer Brother        Meds/Allergies   all current active meds have been reviewed  No Known Allergies    Objective   I/O       07/03 0701 07/04 0700 07/04 0701 07/05 0700 07/05 0701  07/06 0700    P. O.  0 0    NG/GT   285    Feedings   2644    Total Intake(mL/kg)  0 (0) 2929 (91.2)    Urine (mL/kg/hr) 1825 (1.8) 1050 (1.4) 500 (1.6)    Total Output 1825 1050 500    Net -1825 -1050 +2429           Unmeasured Urine Occurrence  1 x 1 x          Physical Exam  Constitutional:       General: She is not in acute distress. Appearance: Normal appearance. She is well-developed. She is not ill-appearing. HENT:      Head: Normocephalic and atraumatic. Right Ear: External ear normal.      Left Ear: External ear normal.      Nose: Nose normal.      Mouth/Throat:      Mouth: Mucous membranes are dry. Eyes:      General: No scleral icterus. Right eye: No discharge. Left eye: No discharge. Extraocular Movements: Extraocular movements intact and EOM normal.      Conjunctiva/sclera: Conjunctivae normal.   Cardiovascular:      Rate and Rhythm: Normal rate. Pulmonary:      Effort: Pulmonary effort is normal.   Abdominal:      General: There is no distension. Palpations: Abdomen is soft. Musculoskeletal:         General: Tenderness (low lumbar midline) present. Skin:     General: Skin is warm and dry. Findings: No rash. Neurological:      Mental Status: She is alert. Psychiatric:         Mood and Affect: Mood normal.         Speech: Speech normal.         Behavior: Behavior normal.         Thought Content: Thought content normal.         Judgment: Judgment normal.       Neurologic Exam     Mental Status   Follows 2 step commands. Attention: normal. Concentration: normal.   Speech: speech is normal   Level of consciousness: alert  Knowledge: good. Normal comprehension. Cranial Nerves     CN III, IV, VI   Extraocular motions are normal.   Right pupil: Shape: regular. Left pupil: Shape: regular. Upgaze: normal  Conjugate gaze: present    CN V   Facial sensation intact. CN VII   Facial expression full, symmetric.      CN VIII   Hearing: intact    CN XII   Tongue: not atrophic  Fasciculations: absent  Tongue deviation: none    Motor Exam RLE 4+/5  LLE 4/5, complaining of pain at left foot noted to be inverted  BUE 4-4+ with no right shoulder movement 2/2 chronic dislocation     Sensory Exam   Light touch normal.     Gait, Coordination, and Reflexes     Tremor   Resting tremor: absent  Intention tremor: absent  Action tremor: absent    Reflexes   Right Camacho: absent  Left Camacho: absent  Right ankle clonus: absent  Left ankle clonus: absent        Vitals:Blood pressure 112/69, pulse 85, temperature 98.5 °F (36.9 °C), temperature source Oral, resp. rate 16, height 4' 9.01" (1.448 m), weight 32.1 kg (70 lb 12.3 oz), SpO2 100 %, not currently breastfeeding. ,Body mass index is 15.31 kg/m². Lab Results:   Results from last 7 days   Lab Units 07/03/23  0516 07/02/23  0600 07/01/23  0719 06/30/23  0716   WBC Thousand/uL 8.66 9.62 10.56* 9.37   HEMOGLOBIN g/dL 10.4* 10.2* 11.0* 11.4*   HEMATOCRIT % 32.8* 31.8* 33.8* 36.1   PLATELETS Thousands/uL 497* 427* 408* 384   MONO PCT %  --   --   --  8   EOS PCT %  --   --   --  0     Results from last 7 days   Lab Units 07/04/23  0451 07/03/23  0516 07/02/23  0600   POTASSIUM mmol/L 4.2 4.4 4.7   CHLORIDE mmol/L 102 102 105   CO2 mmol/L 30 30 26   BUN mg/dL 17 13 11   CREATININE mg/dL 0.46* 0.49* 0.47*   CALCIUM mg/dL 9.3 9.5 9.0   ALK PHOS U/L 103 111 106   ALT U/L 14 16 17   AST U/L 10 5 6     Results from last 7 days   Lab Units 07/02/23  0600 06/30/23  0716   MAGNESIUM mg/dL 1.9 1.6     Results from last 7 days   Lab Units 07/02/23  0600 06/30/23  0716   PHOSPHORUS mg/dL 2.9 2.9         No results found for: "TROPONINT"  ABG:No results found for: "PHART", "KNT4VKN", "PO2ART", "TPW4ZUE", "T2HSARGE", "BEART", "SOURCE"    Imaging Studies: I have personally reviewed pertinent reports. and I have personally reviewed pertinent films in PACS    CT chest abdomen pelvis w contrast    Result Date: 6/25/2023  Impression: 1. No acute pulmonary findings. 2.  New patulous distention of the upper esophagus. Scattered debris in the esophagus, question related to reflux with esophageal dysfunction not excluded. 3.  New findings suspicious for right-sided pyelonephritis.  4. Moderate compression deformity at L4 is new. Severe compression deformity at L5 is worse from the prior exam previously mild. 5.  Heterogeneous soft tissue fullness at the left upper chest wall adjacent to the left anterior first and second ribs. The overall fullness does appear similar to the prior noncontrast exam, question posttraumatic and related to old hematoma but given  the heterogeneity recommend follow-up contrast chest CT in 3 months. 6.  Bilateral shoulder prostheses identified with chronic bilateral anterior shoulder dislocation. The study was marked in [facility] for immediate notification. Workstation performed: [id number]     VTE Prophylaxis: Heparin    Code Status: Level 3 - DNAR and DNI  Advance Directive and Living Will:      Power of :    POLST:      Counseling / Coordination of Care  I spent 20 minutes with the patient.

## 2023-07-05 NOTE — ARC ADMISSION
Referral received for consideration of patient for Inpatient Acute Rehab, will review patients case and continue to follow until a determination can be made.

## 2023-07-05 NOTE — PROGRESS NOTES
[address] [facility]  Progress Note  Name: Nithya Delacruz I  MRN: [id number]  Unit/Bed#: [facility] 926-01 I Date of Admission: 6/25/2023   Date of Service: 7/5/2023 I Hospital Day: 10    Assessment/Plan   * Generalized weakness  Assessment & Plan  · Presenting with increased weakness over the past several days with generalized malaise, 3 days nausea/vomiting as well as intermittent diarrhea. Of note was recently hospitalized here also with generalized weakness and acute cystitis, appears rehab was recommended by PT/OT however patient refused this and went home with home therapies. · Possibly multifactorial in the setting of electrolyte abnormalities, pyelonephritis, bacteremia, dysphagia  · PT/OT  -  Rehab on discharge, CM following  · Status post EGD and PEG tube placement with GI, nutrition consult appreciated    Moderate protein-calorie malnutrition (HCC)  Assessment & Plan  Malnutrition Findings:   Adult Malnutrition type:  (suspect acute and chronic components)  Adult Degree of Malnutrition: Malnutrition of moderate degree  Malnutrition Characteristics: Fat loss, Muscle loss, Inadequate energy                  360 Statement: Moderate malnutrition r/t suspect acute and chronic medical conditions contributing as evidenced by PO intake <50% of estimated needs for 5 days, mild muscle loss around clavicles, deltoids, temples; mild buccal fat pad loss. Treatment to be determined pending further workup, family discussion, etc.    BMI Findings: Body mass index is 15.31 kg/m². Bacteremia due to Escherichia coli  Assessment & Plan  · 1 out of 2 blood cultures positive for E. Coli  · Suspect in setting of acute pyelonephritis  · Completed abx course    Pyelonephritis  Assessment & Plan  · Was treated for acute cystitis during recent admission, CT ab/pelvis now with findings suspicious for right pyelonephritis.   Patient without complaints of right side abdominal/flank pain and with benign examination. UA with WBCs and bacteria. Elevated procalcitonin and WBC is noted  · completed abx course,  · Trend WC, temperature curve, hemodynamics    Protein calorie malnutrition (HCC)  Assessment & Plan  Malnutrition Findings:   Adult Malnutrition type:  (suspect acute and chronic components)  Adult Degree of Malnutrition: Malnutrition of moderate degree  Malnutrition Characteristics: Fat loss, Muscle loss, Inadequate energy                  360 Statement: Moderate malnutrition r/t suspect acute and chronic medical conditions contributing as evidenced by PO intake <50% of estimated needs for 5 days, mild muscle loss around clavicles, deltoids, temples; mild buccal fat pad loss. Treatment to be determined pending further workup, family discussion, etc.    BMI Findings: Body mass index is 15.31 kg/m². Compression fracture of L5 vertebra with routine healing  Assessment & Plan  · Again noted on CT imaging worsened from prior examination, additionally with marked compression deformity at L4.   Patient denying any increase in back pain from baseline  · Now with acute on chronic pain  · Lidocaine patch , tylenol and robaxin  · Neurosurgery consult    Dysphagia  Assessment & Plan  · Speech eval appreciated-patient evaluated again by speech today for pleasure feed consideration, patient with ongoing aspiration and high risk, will continue on strict n.p.o. status while inpatient  · VBS shows no swallowing function  · Status post PEG tube placement -tube feeds currently at goal patient is tolerating    Chronic pain syndrome  Assessment & Plan  · Patient reporting no increase in chronic pain at this time  · PDMP reviewed, we will continue with low-dose oxycodone as needed      Rheumatoid arthritis involving multiple sites with positive rheumatoid factor (HCC)  Assessment & Plan  · Home dose Plaquenil 200 mg in a.m. and 100 mg in PM, methotrexate 7.5 mg every Thursday  · Continue home medications    Abdominal pain-resolved as of 2023  Assessment & Plan  · Resolved after bM on 7.4  · Suspect 2.2 constipation, she is passing flatus  · XRAY negative for obstruction  · Bowel regimen           VTE Pharmacologic Prophylaxis:   Pharmacologic: Heparin  Mechanical VTE Prophylaxis in Place: Yes    Patient Centered Rounds: I have performed bedside rounds with nursing staff today. Discussions with Specialists or Other Care Team Provider: ALAYNA    Education and Discussions with Family / Patient: plan of care, patient. Also called and updated niece. Time Spent for Care: 30 minutes. More than 50% of total time spent on counseling and coordination of care as described above. Current Length of Stay: 10 day(s)    Current Patient Status: Inpatient   Certification Statement: The patient will continue to require additional inpatient hospital stay due to not medically satble\    Discharge Plan: SNF, pending neurosurgery eval    Code Status: Level 3 - DNAR and DNI      Subjective:   No overnight events. Abdominal pain has resolved. since BM yesterday. Reports some lower back pain./    Objective:     Vitals:   Temp (24hrs), Av.3 °F (36.8 °C), Min:97.5 °F (36.4 °C), Max:98.9 °F (37.2 °C)    Temp:  [97.5 °F (36.4 °C)-98.9 °F (37.2 °C)] 98.5 °F (36.9 °C)  HR:  [77-85] 85  Resp:  [18] 18  BP: ()/(62-69) 112/69  SpO2:  [97 %-100 %] 100 %  Body mass index is 15.31 kg/m². Input and Output Summary (last 24 hours): Intake/Output Summary (Last 24 hours) at 2023 1552  Last data filed at 2023 1401  Gross per 24 hour   Intake 2869 ml   Output 900 ml   Net 1969 ml       Physical Exam:     Physical Exam  Constitutional:       General: She is not in acute distress. Cardiovascular:      Rate and Rhythm: Normal rate and regular rhythm. Heart sounds: Normal heart sounds. No murmur heard. Pulmonary:      Effort: No respiratory distress. Breath sounds: Normal breath sounds.  No wheezing or rales.   Abdominal:      General: Bowel sounds are normal. There is no distension. Palpations: Abdomen is soft. Tenderness: no tednerness     Comments: PEG site noted without surrounding  Erythema or signs if infection   Neurological:      Mental Status: She is alert. Comments: Awake alert and communicative  Moves all extremities     Additional Data:     Labs:    Results from last 7 days   Lab Units 07/03/23  0516 07/01/23  0719 06/30/23  0716   WBC Thousand/uL 8.66   < > 9.37   HEMOGLOBIN g/dL 10.4*   < > 11.4*   HEMATOCRIT % 32.8*   < > 36.1   PLATELETS Thousands/uL 497*   < > 384   BANDS PCT %  --   --  1   LYMPHO PCT %  --   --  5*   MONO PCT %  --   --  8   EOS PCT %  --   --  0    < > = values in this interval not displayed. Results from last 7 days   Lab Units 07/04/23  0451   SODIUM mmol/L 134*   POTASSIUM mmol/L 4.2   CHLORIDE mmol/L 102   CO2 mmol/L 30   BUN mg/dL 17   CREATININE mg/dL 0.46*   ANION GAP mmol/L 2   CALCIUM mg/dL 9.3   ALBUMIN g/dL 2.2*   TOTAL BILIRUBIN mg/dL 0.29   ALK PHOS U/L 103   ALT U/L 14   AST U/L 10   GLUCOSE RANDOM mg/dL 109         Results from last 7 days   Lab Units 06/29/23  1313   POC GLUCOSE mg/dl 64*                   * I Have Reviewed All Lab Data Listed Above. * Additional Pertinent Lab Tests Reviewed: All Labs Within Last 24 Hours Reviewed    Imaging:    XR abdomen 1 vw portable   Final Result by Akin Villanueva MD (07/05 0845)   Nonobstructive bowel gas pattern. Known 3 cm calculus in the right collecting system. Percutaneous gastrostomy tube in situ. Workstation performed: [id number]         FL barium swallow video w speech   Final Result by TL Navarro (06/27 1107)      CT chest abdomen pelvis w contrast   Final Result by Marialuisa Alvarado MD (06/25 2005)      1. No acute pulmonary findings. 2.  New patulous distention of the upper esophagus.  Scattered debris in the esophagus, question related to reflux with esophageal dysfunction not excluded. 3.  New findings suspicious for right-sided pyelonephritis. 4. Moderate compression deformity at L4 is new. Severe compression deformity at L5 is worse from the prior exam previously mild. 5.  Heterogeneous soft tissue fullness at the left upper chest wall adjacent to the left anterior first and second ribs. The overall fullness does appear similar to the prior noncontrast exam, question posttraumatic and related to old hematoma but given    the heterogeneity recommend follow-up contrast chest CT in 3 months. 6.  Bilateral shoulder prostheses identified with chronic bilateral anterior shoulder dislocation. The study was marked in [facility] for immediate notification. Workstation performed: [id number]         XR chest 1 view portable   Final Result by Joshua Gustafson MD (06/26 6521)      No acute cardiopulmonary disease. 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          Workstation performed: [id number]             Recent Cultures (last 7 days):           Last 24 Hours Medication List:   Current Facility-Administered Medications   Medication Dose Route Frequency Provider Last Rate   • bisacodyl  10 mg Rectal Daily PRN Carin Jaeger MD     • Diclofenac Sodium  2 g Topical 4x Daily Sayra Pulliam PA-C     • folic acid  2 mg Per PEG Tube Daily Bassem Santillan, DO     • gabapentin  200 mg Per PEG Tube TID Bassem Santillan DO     • heparin (porcine)  5,000 Units Subcutaneous [facility] Mark Section, DO     • HYDROmorphone  0.2 mg Intravenous Q4H PRN Carin Jaeger MD     • hydroxychloroquine  200 mg Per PEG Tube Daily With Breakfast Bassem Santillan DO      And   • hydroxychloroquine  100 mg Per PEG Tube Daily With [employer], DO     • levothyroxine  75 mcg Per PEG Tube Early Morning Bassem Santillan DO     • lidocaine  1 patch Topical Daily Carin Jaeger MD     • melatonin  3 mg Per PEG Tube HS Bassem Santillan, DO     • methocarbamol  500 mg Per PEG Tube Daily PRN Bassem Santillan, DO     • nortriptyline  100 mg Per PEG Tube HS Bassem Santillan, DO     • ondansetron  4 mg Intravenous Q6H PRN Gary Bello DO     • oxyCODONE  2.5 mg Per PEG Tube Q4H PRN Bonnie Pagan PA-C     • pantoprazole  40 mg Intravenous Q24H [address] Hillary Roldan PA-C     • polyethylene glycol  17 g Per PEG Tube Daily Larry Kemp MD     • rOPINIRole  2 mg Per G Tube HS Bassem Santillan, DO     • senna-docusate sodium  1 tablet Oral BID aLrry Kemp MD          Today, Patient Was Seen By: Larry Kemp MD    ** Please Note: Dictation voice to text software may have been used in the creation of this document.  **

## 2023-07-05 NOTE — ASSESSMENT & PLAN NOTE
· Resolved after bM on 7.4  · Suspect 2.2 constipation, she is passing flatus  · XRAY negative for obstruction  · Bowel regimen

## 2023-07-05 NOTE — PLAN OF CARE
Problem: Potential for Falls  Goal: Patient will remain free of falls  Description: INTERVENTIONS:  - Educate patient/family on patient safety including physical limitations  - Instruct patient to call for assistance with activity   - Consult OT/PT to assist with strengthening/mobility   - Keep Call bell within reach  - Keep bed low and locked with side rails adjusted as appropriate  - Keep care items and personal belongings within reach  - Initiate and maintain comfort rounds  - Make Fall Risk Sign visible to staff  - Offer Toileting every 2 Hours, in advance of need  - Initiate/Maintain 2alarm  - Obtain necessary fall risk management equipment: 2  - Apply yellow socks and bracelet for high fall risk patients  - Consider moving patient to room near nurses station  Outcome: Progressing     Problem: MOBILITY - ADULT  Goal: Maintain or return to baseline ADL function  Description: INTERVENTIONS:  -  Assess patient's ability to carry out ADLs; assess patient's baseline for ADL function and identify physical deficits which impact ability to perform ADLs (bathing, care of mouth/teeth, toileting, grooming, dressing, etc.)  - Assess/evaluate cause of self-care deficits   - Assess range of motion  - Assess patient's mobility; develop plan if impaired  - Assess patient's need for assistive devices and provide as appropriate  - Encourage maximum independence but intervene and supervise when necessary  - Involve family in performance of ADLs  - Assess for home care needs following discharge   - Consider OT consult to assist with ADL evaluation and planning for discharge  - Provide patient education as appropriate  Outcome: Progressing  Goal: Maintains/Returns to pre admission functional level  Description: INTERVENTIONS:  - Perform BMAT or MOVE assessment daily.   - Set and communicate daily mobility goal to care team and patient/family/caregiver.    - Collaborate with rehabilitation services on mobility goals if consulted  - Record patient progress and toleration of activity level   Outcome: Progressing     Problem: Prexisting or High Potential for Compromised Skin Integrity  Goal: Skin integrity is maintained or improved  Description: INTERVENTIONS:  - Identify patients at risk for skin breakdown  - Assess and monitor skin integrity  - Assess and monitor nutrition and hydration status  - Monitor labs   - Assess for incontinence   - Turn and reposition patient  - Assist with mobility/ambulation  - Relieve pressure over bony prominences  - Avoid friction and shearing  - Provide appropriate hygiene as needed including keeping skin clean and dry  - Evaluate need for skin moisturizer/barrier cream  - Collaborate with interdisciplinary team   - Patient/family teaching  - Consider wound care consult   Outcome: Progressing     Problem: Nutrition/Hydration-ADULT  Goal: Nutrient/Hydration intake appropriate for improving, restoring or maintaining nutritional needs  Description: Monitor and assess patient's nutrition/hydration status for malnutrition. Collaborate with interdisciplinary team and initiate plan and interventions as ordered. Monitor patient's weight and dietary intake as ordered or per policy. Utilize nutrition screening tool and intervene as necessary. Determine patient's food preferences and provide high-protein, high-caloric foods as appropriate.      INTERVENTIONS:  - Monitor oral intake, urinary output, labs, and treatment plans  - Assess nutrition and hydration status and recommend course of action  - Evaluate amount of meals eaten  - Assist patient with eating if necessary   - Allow adequate time for meals  - Recommend/ encourage appropriate diets, oral nutritional supplements, and vitamin/mineral supplements  - Order, calculate, and assess calorie counts as needed  - Recommend, monitor, and adjust tube feedings and TPN/PPN based on assessed needs  - Assess need for intravenous fluids  - Provide specific nutrition/hydration education as appropriate  - Include patient/family/caregiver in decisions related to nutrition  Outcome: Progressing     Problem: PAIN - ADULT  Goal: Verbalizes/displays adequate comfort level or baseline comfort level  Description: Interventions:  - Encourage patient to monitor pain and request assistance  - Assess pain using appropriate pain scale  - Administer analgesics based on type and severity of pain and evaluate response  - Implement non-pharmacological measures as appropriate and evaluate response  - Consider cultural and social influences on pain and pain management  - Notify physician/advanced practitioner if interventions unsuccessful or patient reports new pain  Outcome: Progressing     Problem: INFECTION - ADULT  Goal: Absence or prevention of progression during hospitalization  Description: INTERVENTIONS:  - Assess and monitor for signs and symptoms of infection  - Monitor lab/diagnostic results  - Monitor all insertion sites, i.e. indwelling lines, tubes, and drains  - Monitor endotracheal if appropriate and nasal secretions for changes in amount and color  - [place] appropriate cooling/warming therapies per order  - Administer medications as ordered  - Instruct and encourage patient and family to use good hand hygiene technique  - Identify and instruct in appropriate isolation precautions for identified infection/condition  Outcome: Progressing  Goal: Absence of fever/infection during neutropenic period  Description: INTERVENTIONS:  - Monitor WBC    Outcome: Progressing     Problem: DISCHARGE PLANNING  Goal: Discharge to home or other facility with appropriate resources  Description: INTERVENTIONS:  - Identify barriers to discharge w/patient and caregiver  - Arrange for needed discharge resources and transportation as appropriate  - Identify discharge learning needs (meds, wound care, etc.)  - Arrange for interpretive services to assist at discharge as needed  - Refer to Case Management Department for coordinating discharge planning if the patient needs post-hospital services based on physician/advanced practitioner order or complex needs related to functional status, cognitive ability, or social support system  Outcome: Progressing     Problem: Knowledge Deficit  Goal: Patient/family/caregiver demonstrates understanding of disease process, treatment plan, medications, and discharge instructions  Description: Complete learning assessment and assess knowledge base.   Interventions:  - Provide teaching at level of understanding  - Provide teaching via preferred learning methods  Outcome: Progressing

## 2023-07-05 NOTE — ASSESSMENT & PLAN NOTE
Malnutrition Findings:   Adult Malnutrition type:  (suspect acute and chronic components)  Adult Degree of Malnutrition: Malnutrition of moderate degree  Malnutrition Characteristics: Fat loss, Muscle loss, Inadequate energy                 Recent PEG placement 6/28      360 Statement: Moderate malnutrition r/t suspect acute and chronic medical conditions contributing as evidenced by PO intake <50% of estimated needs for 5 days, mild muscle loss around clavicles, deltoids, temples; mild buccal fat pad loss. Treatment to be determined pending further workup, family discussion, etc.    BMI Findings: Body mass index is 15.31 kg/m².

## 2023-07-05 NOTE — ASSESSMENT & PLAN NOTE
· Again noted on CT imaging worsened from prior examination, additionally with marked compression deformity at L4.   Patient denying any increase in back pain from baseline  · Now with acute on chronic pain  · Lidocaine patch , tylenol and robaxin  · Neurosurgery consult

## 2023-07-05 NOTE — ASSESSMENT & PLAN NOTE
New moderate L4 fracture, progressive L5 fracture  • Stable severe fracture at L3  • Mild retropulsion at L3 and L4 with canal stenosis    Imaging:   • CT chest abdomen pelvis with 6/25/23: Moderate compression fracture at L4 is new. Severe compression deformity at L5 is worse from prior exam.    Plan:   • Continue to monitor neurological exam  • CT results reviewed with patient. Though there is a new fracture, pedicles are intact. • If pain is severe and limiting mobility, discussed consideration for kyphoplasty but unfortunately this is not a an option for this patient at this time secondary to bacteremia and infection. • Discussed option for bracing. Discussed concerns given recent PEG tube placement. Bracing deferred at this time but may be ordered as needed. • Order baseline upright x-rays if patient is able to stand for imaging. If not can consider sitting x-rays. • No neurosurgical intervention anticipated. • Pain control per primary team.    o Consider adding Tylenol 925 mg every 8 hours. • Okay to mobilize with physical and Occupational Therapy. • DVT prophylaxis: Bilateral SCDs. Subcutaneous heparin. We will follow from periphery and review x-rays after completion. Call with other questions or concerns.

## 2023-07-05 NOTE — PLAN OF CARE
Problem: Potential for Falls  Goal: Patient will remain free of falls  Description: INTERVENTIONS:  - Educate patient/family on patient safety including physical limitations  - Instruct patient to call for assistance with activity   - Consult OT/PT to assist with strengthening/mobility   - Keep Call bell within reach  - Keep bed low and locked with side rails adjusted as appropriate  - Keep care items and personal belongings within reach  - Initiate and maintain comfort rounds  - Make Fall Risk Sign visible to staff  - Offer Toileting every 3 Hours, in advance of need  - Initiate/Maintain 3alarm  - Obtain necessary fall risk management equipment: 3  - Apply yellow socks and bracelet for high fall risk patients  - Consider moving patient to room near nurses station  Outcome: Progressing     Problem: MOBILITY - ADULT  Goal: Maintain or return to baseline ADL function  Description: INTERVENTIONS:  -  Assess patient's ability to carry out ADLs; assess patient's baseline for ADL function and identify physical deficits which impact ability to perform ADLs (bathing, care of mouth/teeth, toileting, grooming, dressing, etc.)  - Assess/evaluate cause of self-care deficits   - Assess range of motion  - Assess patient's mobility; develop plan if impaired  - Assess patient's need for assistive devices and provide as appropriate  - Encourage maximum independence but intervene and supervise when necessary  - Involve family in performance of ADLs  - Assess for home care needs following discharge   - Consider OT consult to assist with ADL evaluation and planning for discharge  - Provide patient education as appropriate  Outcome: Progressing  Goal: Maintains/Returns to pre admission functional level  Description: INTERVENTIONS:  - Perform BMAT or MOVE assessment daily.   - Set and communicate daily mobility goal to care team and patient/family/caregiver.    - Collaborate with rehabilitation services on mobility goals if consulted  - Record patient progress and toleration of activity level   Outcome: Progressing     Problem: Prexisting or High Potential for Compromised Skin Integrity  Goal: Skin integrity is maintained or improved  Description: INTERVENTIONS:  - Identify patients at risk for skin breakdown  - Assess and monitor skin integrity  - Assess and monitor nutrition and hydration status  - Monitor labs   - Assess for incontinence   - Turn and reposition patient  - Assist with mobility/ambulation  - Relieve pressure over bony prominences  - Avoid friction and shearing  - Provide appropriate hygiene as needed including keeping skin clean and dry  - Evaluate need for skin moisturizer/barrier cream  - Collaborate with interdisciplinary team   - Patient/family teaching  - Consider wound care consult   Outcome: Progressing     Problem: Nutrition/Hydration-ADULT  Goal: Nutrient/Hydration intake appropriate for improving, restoring or maintaining nutritional needs  Description: Monitor and assess patient's nutrition/hydration status for malnutrition. Collaborate with interdisciplinary team and initiate plan and interventions as ordered. Monitor patient's weight and dietary intake as ordered or per policy. Utilize nutrition screening tool and intervene as necessary. Determine patient's food preferences and provide high-protein, high-caloric foods as appropriate.      INTERVENTIONS:  - Monitor oral intake, urinary output, labs, and treatment plans  - Assess nutrition and hydration status and recommend course of action  - Evaluate amount of meals eaten  - Assist patient with eating if necessary   - Allow adequate time for meals  - Recommend/ encourage appropriate diets, oral nutritional supplements, and vitamin/mineral supplements  - Order, calculate, and assess calorie counts as needed  - Recommend, monitor, and adjust tube feedings and TPN/PPN based on assessed needs  - Assess need for intravenous fluids  - Provide specific nutrition/hydration education as appropriate  - Include patient/family/caregiver in decisions related to nutrition  Outcome: Progressing     Problem: PAIN - ADULT  Goal: Verbalizes/displays adequate comfort level or baseline comfort level  Description: Interventions:  - Encourage patient to monitor pain and request assistance  - Assess pain using appropriate pain scale  - Administer analgesics based on type and severity of pain and evaluate response  - Implement non-pharmacological measures as appropriate and evaluate response  - Consider cultural and social influences on pain and pain management  - Notify physician/advanced practitioner if interventions unsuccessful or patient reports new pain  Outcome: Progressing     Problem: INFECTION - ADULT  Goal: Absence or prevention of progression during hospitalization  Description: INTERVENTIONS:  - Assess and monitor for signs and symptoms of infection  - Monitor lab/diagnostic results  - Monitor all insertion sites, i.e. indwelling lines, tubes, and drains  - Monitor endotracheal if appropriate and nasal secretions for changes in amount and color  - [place] appropriate cooling/warming therapies per order  - Administer medications as ordered  - Instruct and encourage patient and family to use good hand hygiene technique  - Identify and instruct in appropriate isolation precautions for identified infection/condition  Outcome: Progressing  Goal: Absence of fever/infection during neutropenic period  Description: INTERVENTIONS:  - Monitor WBC    Outcome: Progressing     Problem: DISCHARGE PLANNING  Goal: Discharge to home or other facility with appropriate resources  Description: INTERVENTIONS:  - Identify barriers to discharge w/patient and caregiver  - Arrange for needed discharge resources and transportation as appropriate  - Identify discharge learning needs (meds, wound care, etc.)  - Arrange for interpretive services to assist at discharge as needed  - Refer to Case Management Department for coordinating discharge planning if the patient needs post-hospital services based on physician/advanced practitioner order or complex needs related to functional status, cognitive ability, or social support system  Outcome: Progressing     Problem: Knowledge Deficit  Goal: Patient/family/caregiver demonstrates understanding of disease process, treatment plan, medications, and discharge instructions  Description: Complete learning assessment and assess knowledge base.   Interventions:  - Provide teaching at level of understanding  - Provide teaching via preferred learning methods  Outcome: Progressing

## 2023-07-05 NOTE — CASE MANAGEMENT
Case Management Discharge Planning Note    Patient name Cat Espinosa  Location [facility] 926/[facility] 926-01 MRN [id number]  : 1945 Date 2023       Current Admission Date: 2023  Current Admission Diagnosis:Generalized weakness   Patient Active Problem List    Diagnosis Date Noted   • Abdominal pain 2023   • Moderate protein-calorie malnutrition ([address]) 2023   • Bacteremia due to Escherichia coli 2023   • Pyelonephritis 2023   • Hypokalemia 2023   • Hypomagnesemia 2023   • Generalized weakness 2023   • Acute cystitis with hematuria 2023   • Urinary retention 2023   • Immunodeficiency due to drugs ([address]) 2023   • Chronic venous hypertension (idiopathic) with ulcer of left lower extremity (CODE) ([address]) 2023   • Diabetic ulcer of left midfoot associated with type 2 diabetes mellitus, with bone involvement without evidence of necrosis ([address]) 2023   • Other diseases of thymus ([address]) 2023   • Protein calorie malnutrition ([address]) 2023   • Weight loss 2023   • Acute encephalopathy 2023   • Polypharmacy 2023   • Hypercalcemia 2023   • Compression fracture of L5 vertebra with routine healing 2023   • Acute low back pain 2023   • Effusion of right shoulder joint 2023   • Arm bruise, right, initial encounter 2023   • Concern for cerebral contusion 2023   • Dysphagia 2023   • 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   • Non-healing open wound of heel, initial encounter 10/12/2022   • Anemia 2022   • Pressure injury of left foot, stage 4 ([address])    • Hyponatremia 2022   • Venous insufficiency 2021   • Encounter for annual wellness exam in Medicare patient 2021   • S/P reverse total shoulder arthroplasty, left 2021   • Rupture long head biceps tendon, left, initial encounter 2021   • Shoulder dislocation 2021   • Depression 09/09/2020   • Other forms of systemic lupus erythematosus ([address]) 08/31/2020   • Vitamin D deficiency 07/24/2019   • Chronic pain syndrome 04/18/2019   • Rheumatoid arthritis involving multiple sites with positive rheumatoid factor ([address]) 03/04/2019   • Ambulatory dysfunction 11/13/2018   • Closed compression fracture of L3 lumbar vertebra 11/13/2018   • S/P total hip arthroplasty 11/27/2017   • Anxiety 08/30/2017   • Urinary tract infection without hematuria 08/30/2017   • RLS (restless legs syndrome) 08/30/2017   • RBBB 08/23/2017   • Age-related osteoporosis with current pathological fracture with routine healing 04/18/2017   • Hypothyroidism due to Hashimoto's thyroiditis 12/13/2016      LOS (days): 10  Geometric Mean LOS (GMLOS) (days): 2.80  Days to GMLOS:-6.8     OBJECTIVE:  Risk of Unplanned Readmission Score: 38.66         Current admission status: Inpatient   Preferred Pharmacy:   [address] [place] [address], [address] [place] [address]  Phone: [phone] Fax: [address], [address] [place] [address]  Phone: [phone] Fax: [phone]    Primary Care Provider: Sonam Jhon MD    Primary Insurance: MEDICARE  Secondary Insurance:     DISCHARGE DETAILS:    Discharge planning discussed with[de-identified] Patient's niece, Saurav Gum of Choice: Yes  Comments - Freedom of Choice: Referral sent to [facility] referral to [facility] contacted family/caregiver?: Yes             Contacts  Patient Contacts: Nita Dasilva (niece)  Relationship to Patient[de-identified] Family  Contact Method: Phone  Phone Number: [phone]  Reason/Outcome: Continuity of Care, Emergency Contact, Discharge Planning, Referral    Requested [address]         Is the patient interested in [address] [address] [address] at discharge?: No    DME Referral Provided  Referral made for DME?: No    Other Referral/Resources/Interventions Provided:  Interventions: Acute Rehab, Short Term Rehab         Treatment Team Recommendation: Short Term Rehab  Discharge Destination Plan[de-identified] Acute Rehab, Short Term Rehab           Additional Comments: CM called pt's niece to review PT/OT rec for STR. Pt's niece agreeable to blanket referral to STR facilities and requested a referral be sent to [facility]. CM sent both referrals via [employer]. CM to follow.

## 2023-07-05 NOTE — PROGRESS NOTES
Pastoral Care Progress Note    2023  Patient: Alyssa Estrada : 1945  Admission Date & Time: 2023 1553  MRN: [id number] CSN: [id number]           23 0900   Clinical Encounter Type   Visited With Patient   Hinduism Encounters   Hinduism Needs Prayer     Risa Francis met with the pt and provided prayers and blessings. No further needs were expressed at this time. Chaplains still remain available.

## 2023-07-06 ENCOUNTER — APPOINTMENT (INPATIENT)
Dept: RADIOLOGY | Facility: HOSPITAL | Age: 78
DRG: 689 | End: 2023-07-06
Payer: MEDICARE

## 2023-07-06 LAB
ANION GAP SERPL CALCULATED.3IONS-SCNC: 4 MMOL/L
BASOPHILS # BLD MANUAL: 0 THOUSAND/UL (ref 0–0.1)
BASOPHILS NFR MAR MANUAL: 0 % (ref 0–1)
BUN SERPL-MCNC: 20 MG/DL (ref 5–25)
CALCIUM SERPL-MCNC: 9.8 MG/DL (ref 8.3–10.1)
CHLORIDE SERPL-SCNC: 102 MMOL/L (ref 96–108)
CO2 SERPL-SCNC: 26 MMOL/L (ref 21–32)
CREAT SERPL-MCNC: 0.54 MG/DL (ref 0.6–1.3)
EOSINOPHIL # BLD MANUAL: 0.31 THOUSAND/UL (ref 0–0.4)
EOSINOPHIL NFR BLD MANUAL: 4 % (ref 0–6)
ERYTHROCYTE [DISTWIDTH] IN BLOOD BY AUTOMATED COUNT: 15.9 % (ref 11.6–15.1)
GFR SERPL CREATININE-BSD FRML MDRD: 90 ML/MIN/1.73SQ M
GIANT PLATELETS BLD QL SMEAR: PRESENT
GLUCOSE SERPL-MCNC: 98 MG/DL (ref 65–140)
HCT VFR BLD AUTO: 35 % (ref 34.8–46.1)
HGB BLD-MCNC: 10.9 G/DL (ref 11.5–15.4)
LYMPHOCYTES # BLD AUTO: 0.61 THOUSAND/UL (ref 0.6–4.47)
LYMPHOCYTES # BLD AUTO: 8 % (ref 14–44)
MCH RBC QN AUTO: 26.4 PG (ref 26.8–34.3)
MCHC RBC AUTO-ENTMCNC: 31.1 G/DL (ref 31.4–37.4)
MCV RBC AUTO: 85 FL (ref 82–98)
MONOCYTES # BLD AUTO: 0.92 THOUSAND/UL (ref 0–1.22)
MONOCYTES NFR BLD: 12 % (ref 4–12)
MYELOCYTES NFR BLD MANUAL: 1 % (ref 0–1)
NEUTROPHILS # BLD MANUAL: 5.2 THOUSAND/UL (ref 1.85–7.62)
NEUTS BAND NFR BLD MANUAL: 3 % (ref 0–8)
NEUTS SEG NFR BLD AUTO: 65 % (ref 43–75)
OVALOCYTES BLD QL SMEAR: PRESENT
PLATELET # BLD AUTO: 585 THOUSANDS/UL (ref 149–390)
PLATELET BLD QL SMEAR: ABNORMAL
PMV BLD AUTO: 8.6 FL (ref 8.9–12.7)
POIKILOCYTOSIS BLD QL SMEAR: PRESENT
POLYCHROMASIA BLD QL SMEAR: PRESENT
POTASSIUM SERPL-SCNC: 4.6 MMOL/L (ref 3.5–5.3)
RBC # BLD AUTO: 4.13 MILLION/UL (ref 3.81–5.12)
RBC MORPH BLD: PRESENT
SCHISTOCYTES BLD QL SMEAR: PRESENT
SODIUM SERPL-SCNC: 132 MMOL/L (ref 135–147)
VARIANT LYMPHS # BLD AUTO: 7 %
WBC # BLD AUTO: 7.65 THOUSAND/UL (ref 4.31–10.16)

## 2023-07-06 PROCEDURE — 72100 X-RAY EXAM L-S SPINE 2/3 VWS: CPT

## 2023-07-06 PROCEDURE — 97530 THERAPEUTIC ACTIVITIES: CPT

## 2023-07-06 PROCEDURE — 85027 COMPLETE CBC AUTOMATED: CPT | Performed by: INTERNAL MEDICINE

## 2023-07-06 PROCEDURE — 80048 BASIC METABOLIC PNL TOTAL CA: CPT | Performed by: INTERNAL MEDICINE

## 2023-07-06 PROCEDURE — C9113 INJ PANTOPRAZOLE SODIUM, VIA: HCPCS | Performed by: PHYSICIAN ASSISTANT

## 2023-07-06 PROCEDURE — 99232 SBSQ HOSP IP/OBS MODERATE 35: CPT | Performed by: INTERNAL MEDICINE

## 2023-07-06 PROCEDURE — 85007 BL SMEAR W/DIFF WBC COUNT: CPT | Performed by: INTERNAL MEDICINE

## 2023-07-06 PROCEDURE — 99231 SBSQ HOSP IP/OBS SF/LOW 25: CPT | Performed by: PHYSICIAN ASSISTANT

## 2023-07-06 PROCEDURE — 97535 SELF CARE MNGMENT TRAINING: CPT

## 2023-07-06 PROCEDURE — 97110 THERAPEUTIC EXERCISES: CPT

## 2023-07-06 RX ORDER — ACETAMINOPHEN 325 MG/1
975 TABLET ORAL EVERY 8 HOURS SCHEDULED
Status: DISCONTINUED | OUTPATIENT
Start: 2023-07-06 | End: 2023-07-08 | Stop reason: HOSPADM

## 2023-07-06 RX ADMIN — ROPINIROLE 2 MG: 2 TABLET, FILM COATED ORAL at 21:57

## 2023-07-06 RX ADMIN — ACETAMINOPHEN 975 MG: 325 TABLET, FILM COATED ORAL at 21:57

## 2023-07-06 RX ADMIN — NORTRIPTYLINE HYDROCHLORIDE 100 MG: 50 CAPSULE ORAL at 22:00

## 2023-07-06 RX ADMIN — HYDROMORPHONE HYDROCHLORIDE 0.2 MG: 0.2 INJECTION, SOLUTION INTRAMUSCULAR; INTRAVENOUS; SUBCUTANEOUS at 10:38

## 2023-07-06 RX ADMIN — DICLOFENAC SODIUM 2 G: 10 GEL TOPICAL at 08:56

## 2023-07-06 RX ADMIN — Medication 2.5 MG: at 16:17

## 2023-07-06 RX ADMIN — HYDROXYCHLOROQUINE SULFATE 200 MG: 200 TABLET ORAL at 08:56

## 2023-07-06 RX ADMIN — PANTOPRAZOLE SODIUM 40 MG: 40 INJECTION, POWDER, FOR SOLUTION INTRAVENOUS at 08:55

## 2023-07-06 RX ADMIN — LEVOTHYROXINE SODIUM 75 MCG: 75 TABLET ORAL at 05:32

## 2023-07-06 RX ADMIN — DICLOFENAC SODIUM 2 G: 10 GEL TOPICAL at 16:16

## 2023-07-06 RX ADMIN — GABAPENTIN 200 MG: 100 CAPSULE ORAL at 16:17

## 2023-07-06 RX ADMIN — HEPARIN SODIUM 5000 UNITS: 5000 INJECTION INTRAVENOUS; SUBCUTANEOUS at 21:57

## 2023-07-06 RX ADMIN — GABAPENTIN 200 MG: 100 CAPSULE ORAL at 08:56

## 2023-07-06 RX ADMIN — SENNOSIDES AND DOCUSATE SODIUM 1 TABLET: 50; 8.6 TABLET ORAL at 16:17

## 2023-07-06 RX ADMIN — GABAPENTIN 200 MG: 100 CAPSULE ORAL at 21:57

## 2023-07-06 RX ADMIN — MELATONIN TAB 3 MG 3 MG: 3 TAB at 21:57

## 2023-07-06 RX ADMIN — SENNOSIDES AND DOCUSATE SODIUM 1 TABLET: 50; 8.6 TABLET ORAL at 08:55

## 2023-07-06 RX ADMIN — ACETAMINOPHEN 975 MG: 325 TABLET, FILM COATED ORAL at 09:23

## 2023-07-06 RX ADMIN — Medication 2.5 MG: at 23:03

## 2023-07-06 RX ADMIN — HEPARIN SODIUM 5000 UNITS: 5000 INJECTION INTRAVENOUS; SUBCUTANEOUS at 05:32

## 2023-07-06 RX ADMIN — DICLOFENAC SODIUM 2 G: 10 GEL TOPICAL at 22:00

## 2023-07-06 RX ADMIN — POLYETHYLENE GLYCOL 3350 17 G: 17 POWDER, FOR SOLUTION ORAL at 08:55

## 2023-07-06 RX ADMIN — HEPARIN SODIUM 5000 UNITS: 5000 INJECTION INTRAVENOUS; SUBCUTANEOUS at 13:20

## 2023-07-06 RX ADMIN — HYDROXYCHLOROQUINE SULFATE 100 MG: 200 TABLET ORAL at 16:16

## 2023-07-06 RX ADMIN — ACETAMINOPHEN 975 MG: 325 TABLET, FILM COATED ORAL at 13:20

## 2023-07-06 RX ADMIN — LIDOCAINE 5% 1 PATCH: 700 PATCH TOPICAL at 08:55

## 2023-07-06 RX ADMIN — FOLIC ACID 2 MG: 1 TABLET ORAL at 08:55

## 2023-07-06 RX ADMIN — DICLOFENAC SODIUM 2 G: 10 GEL TOPICAL at 11:00

## 2023-07-06 NOTE — ASSESSMENT & PLAN NOTE
Malnutrition Findings:   Adult Malnutrition type:  (suspect acute and chronic components)  Adult Degree of Malnutrition: Malnutrition of moderate degree  Malnutrition Characteristics: Fat loss, Muscle loss, Inadequate energy                  360 Statement: Moderate malnutrition r/t suspect acute and chronic medical conditions contributing as evidenced by PO intake <50% of estimated needs for 5 days, mild muscle loss around clavicles, deltoids, temples; mild buccal fat pad loss. Treatment to be determined pending further workup, family discussion, etc.    BMI Findings: Body mass index is 16.03 kg/m².

## 2023-07-06 NOTE — PROGRESS NOTES
Upright XR personally reviewed. L3 and L4 appear stable. Unable to adequately visualize L5 fracture on XR.      Plan:  Continue pain control and therapy  Brace PRN, likely unable to tolerate with PEG tube  Cannot proceed with kyphoplasty due to current infection / bacteremia  Follow up in our office on an as needed basis or if symptoms worsen

## 2023-07-06 NOTE — PLAN OF CARE
Problem: Potential for Falls  Goal: Patient will remain free of falls  Description: INTERVENTIONS:  - Educate patient/family on patient safety including physical limitations  - Instruct patient to call for assistance with activity   - Consult OT/PT to assist with strengthening/mobility   - Keep Call bell within reach  - Keep bed low and locked with side rails adjusted as appropriate  - Keep care items and personal belongings within reach  - Initiate and maintain comfort rounds  - Make Fall Risk Sign visible to staff  - Offer Toileting every  Hours, in advance of need  - Initiate/Maintain alarm  - Obtain necessary fall risk management equipment:   - Apply yellow socks and bracelet for high fall risk patients  - Consider moving patient to room near nurses station  7/6/2023 1048 by Cruz Issa, RN  Outcome: Progressing  7/6/2023 1047 by Cruz Issa, RN  Outcome: Progressing

## 2023-07-06 NOTE — ASSESSMENT & PLAN NOTE
· Again noted on CT imaging worsened from prior examination, additionally with marked compression deformity at L4. Patient denying any increase in back pain from baseline  · Now with acute on chronic pain  · Lidocaine patch , tylenol and robaxin  · Neurosurgery consulted>> Xrays shows stable fracture>> no surgical intervention. Not a candidate for now given recent bacteremia  · Pain control with tylenol. Lidocaine patch and prn robaxin.

## 2023-07-06 NOTE — CASE MANAGEMENT
Case Management Discharge Planning Note    Patient name Gracie Cali  Location [facility] 926/[facility] 926-01 MRN [id number]  : 1945 Date 2023       Current Admission Date: 2023  Current Admission Diagnosis:Generalized weakness   Patient Active Problem List    Diagnosis Date Noted   • L4 vertebral fracture ([address]) 2023   • Moderate protein-calorie malnutrition ([address]) 2023   • Bacteremia due to Escherichia coli 2023   • Pyelonephritis 2023   • Hypokalemia 2023   • Hypomagnesemia 2023   • Generalized weakness 2023   • Acute cystitis with hematuria 2023   • Urinary retention 2023   • Immunodeficiency due to drugs ([address]) 2023   • Chronic venous hypertension (idiopathic) with ulcer of left lower extremity (CODE) ([address]) 2023   • Diabetic ulcer of left midfoot associated with type 2 diabetes mellitus, with bone involvement without evidence of necrosis ([address]) 2023   • Other diseases of thymus ([address]) 2023   • Protein calorie malnutrition ([address]) 2023   • Weight loss 2023   • Acute encephalopathy 2023   • Polypharmacy 2023   • Hypercalcemia 2023   • Compression fracture of L5 vertebra with routine healing 2023   • Acute low back pain 2023   • Effusion of right shoulder joint 2023   • Arm bruise, right, initial encounter 2023   • Concern for cerebral contusion 2023   • Dysphagia 2023   • 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   • Non-healing open wound of heel, initial encounter 10/12/2022   • Anemia 2022   • Pressure injury of left foot, stage 4 ([address])    • Hyponatremia 2022   • Venous insufficiency 2021   • Encounter for annual wellness exam in Medicare patient 2021   • S/P reverse total shoulder arthroplasty, left 2021   • Rupture long head biceps tendon, left, initial encounter 2021   • Shoulder dislocation 04/19/2021   • Depression 09/09/2020   • Other forms of systemic lupus erythematosus ([address]) 08/31/2020   • Vitamin D deficiency 07/24/2019   • Chronic pain syndrome 04/18/2019   • Rheumatoid arthritis involving multiple sites with positive rheumatoid factor ([address]) 03/04/2019   • Ambulatory dysfunction 11/13/2018   • Closed compression fracture of L3 lumbar vertebra 11/13/2018   • S/P total hip arthroplasty 11/27/2017   • Anxiety 08/30/2017   • Urinary tract infection without hematuria 08/30/2017   • RLS (restless legs syndrome) 08/30/2017   • RBBB 08/23/2017   • Age-related osteoporosis with current pathological fracture with routine healing 04/18/2017   • Hypothyroidism due to Hashimoto's thyroiditis 12/13/2016      LOS (days): 11  Geometric Mean LOS (GMLOS) (days): 2.80  Days to GMLOS:-8     OBJECTIVE:  Risk of Unplanned Readmission Score: 39.44         Current admission status: Inpatient   Preferred Pharmacy:   [address] [place] [address], [address] [place] [address]  Phone: [phone] Fax: [address] [place] [address] Drive, [address] [place] [address]  Phone: [phone] Fax: [phone]    Primary Care Provider: Jazmin Guerin MD    Primary Insurance: MEDICARE  Secondary Insurance:     DISCHARGE DETAILS:     Additional Comments: CM called pt's niece to ask if she wanted the current STR facility list emailed or left in pt's room to review. Pt's niece asked that it be left in pt's room and she will review it tomorrow. CM left list in pt's room and also reviewed this information with pt. Pt agreeable. CM to follow.

## 2023-07-06 NOTE — OCCUPATIONAL THERAPY NOTE
Occupational Therapy Progress Note     Patient Name: Samuel Nieto  Today's Date: 7/6/2023  Problem List  Principal Problem:    Generalized weakness  Active Problems:    Rheumatoid arthritis involving multiple sites with positive rheumatoid factor (HCC)    Chronic pain syndrome    Hyponatremia    Dysphagia    Compression fracture of L5 vertebra with routine healing    Protein calorie malnutrition (HCC)    Pyelonephritis    Hypokalemia    Bacteremia due to Escherichia coli    L4 vertebral fracture ([address])          07/06/23 1042   OT Last Visit   OT Visit Date 07/06/23   Note Type   Note Type Treatment   Pain Assessment   Pain Assessment Tool 0-10   Pain Score 8   Pain Location/Orientation Location: Back   Hospital Pain Intervention(s) Repositioned; Ambulation/increased activity   Restrictions/Precautions   Weight Bearing Precautions Per Order   (chronic R shoulder dislocation/subluxation, per chart review, pt was WBAT on previousadmissions; per ortho, pt cleared for RW)   Braces or Orthoses   (no brace per neurosx)   Other Precautions (S)  Chair Alarm; Bed Alarm;Multiple lines; Fall Risk;Pain;Spinal precautions  (L4 fx)   Lifestyle   Autonomy PTA, pt requires assistance w/ ADLs/IADLs and uses RW for functional mobility; (-) driving   Reciprocal Relationships Lives w/ her sister; home health aides Monday-Friday 5 hours/day   Service to Others Retired   [address] [place] [address] Enjoys watching  shows   ADL   Grooming Assistance 1  Total Assistance   Grooming Deficit Brushing hair  (+ washing hair with shampoo cap)   Grooming Comments Pt unable to raise UEs to head in order to complete grooming tasks   UB Dressing Assistance 3  Moderate Assistance   UB Dressing Comments MIN A to doff gown, MOD A to don   LB Dressing Assistance 2  Maximal Assistance   LB Dressing Deficit Thread RLE into underwear; Thread LLE into underwear;Pull up over hips;Don/doff R shoe;Don/doff L shoe   Bed Mobility   Supine to Sit 3  Moderate assistance   Additional items Assist x 2;HOB elevated; Bedrails; Increased time required   Sit to Supine Unable to assess   Transfers   Sit to Stand 2  Maximal assistance   Additional items Assist x 2; Increased time required   Stand to Sit 2  Maximal assistance   Additional items Assist x 2; Increased time required   Sliding Board transfer 2  Maximal assistance   Additional items Assist x 2; Increased time required   Additional Comments ~ 75% clearance for STS, transfers from bed to drop arm chair   Cognition   Overall Cognitive Status WFL   Arousal/Participation Responsive; Cooperative   Attention Attends with cues to redirect   Orientation Level Oriented X4   Memory Unable to assess   Following Commands Follows one step commands with increased time or repetition   Comments Pt pleasant and cooperative t/o session   Activity Tolerance   Activity Tolerance Patient limited by fatigue;Patient limited by pain   Medical Staff Made Aware PT Odessa Meza RN Kathy   Assessment   Assessment Patient participated in Skilled OT session this date with interventions consisting of ADL re training with the use of correct body mechnaics, Energy Conservation techniques, safety awareness and fall prevention techniques, maintaining spinal precautions,  therapeutic activities to: increase activity tolerance, increase dynamic sit/ stand balance during functional activity  and increase OOB/ sitting tolerance . Upon arrival patient was found supine in bed. Pt demonstrated the following tasks: MOD A x 2 sup to sit, MAX A x 2 STS and slide board transfer. Pt requires total A for hair care and MAX A for LBD. Pt doffs gown with MIN A, MOD A to don gown. Pt educated on spinal precautions - verbalized understanding. Patient continues to be functioning below baseline level, occupational performance remains limited secondary to factors listed above and increased risk for falls and injury. From OT standpoint, recommendation at time of d/c would be STR. Patient to benefit from continued Occupational Therapy treatment while in the hospital to address deficits as defined above and maximize level of functional independence with ADLs and functional mobility. Pt was left after session with all current needs met. The patient's raw score on the AM-PAC Daily Activity Inpatient Short Form is 12. A raw score of less than 19 suggests the patient may benefit from discharge to post-acute rehabilitation services. Please refer to the recommendation of the Occupational Therapist for safe discharge planning. Plan   Treatment Interventions ADL retraining;Functional transfer training;UE strengthening/ROM; Endurance training;Patient/family training;Equipment evaluation/education; Compensatory technique education;Continued evaluation; Activityengagement; Energy conservation   Goal Expiration Date 07/18/23   OT Treatment Day 1   OT Frequency 2-3x/wk   Recommendation   OT Discharge Recommendation Post acute rehabilitation services   [facility] Daily Activity Inpatient   Lower Body Dressing 2   Bathing 2   Toileting 2   Upper Body Dressing 2   Grooming 1   Eating 3   Daily Activity Raw Score 12   Daily Activity Standardized Score (Calc for Raw Score >=11) 30.6   AM-[facility] Applied Cognition Inpatient   Following a Speech/Presentation 3   Understanding Ordinary Conversation 4   Taking Medications 4   Remembering Where Things Are Placed or Put Away 4   Remembering List of 4-5 Errands 3   Taking Care of Complicated Tasks 3   Applied Cognition Raw Score 21   Applied Cognition Standardized Score 44.3     Shane Olivas MS, OTR/L

## 2023-07-06 NOTE — RESTORATIVE TECHNICIAN NOTE
Restorative Technician Note      Patient Name: Faviola Dey     Restorative Tech Visit Date: 07/06/23  Note Type: Mobility  Patient Position Upon Consult: Other (Comment) (pt in supine on transport stretcher)  Activity Performed: Transferred  Assistive Device: Other (Comment) (smooth )  Patient Position at End of Consult: Supine;  All needs within reach; Bed/Chair alarm activated      Giselle Ramírez

## 2023-07-06 NOTE — RESTORATIVE TECHNICIAN NOTE
Restorative Technician Note      Patient Name: Rasheed Sylvester     Restorative Tech Visit Date: 07/06/23  Note Type: Mobility  Patient Position Upon Consult: Other (Comment) (pt in supine on transport stretcher)  Activity Performed: Transferred  Assistive Device: Other (Comment) (smooth )  Patient Position at End of Consult: Supine;  All needs within reach; Bed/Chair alarm activated      Roselia Pinedo

## 2023-07-06 NOTE — PLAN OF CARE
Problem: OCCUPATIONAL THERAPY ADULT  Goal: Performs self-care activities at highest level of function for planned discharge setting. See evaluation for individualized goals. Description: Treatment Interventions: ADL retraining, Functional transfer training, UE strengthening/ROM, Endurance training, Patient/family training, Equipment evaluation/education, Fine motor coordination activities, Compensatory technique education, Continued evaluation, Energy conservation, Activityengagement          See flowsheet documentation for full assessment, interventions and recommendations. Outcome: Progressing  Note: Limitation: Decreased ADL status, Decreased UE ROM, Decreased UE strength, Decreased endurance, Decreased self-care trans, Decreased high-level ADLs, Decreased fine motor control  Prognosis: Fair  Assessment: Patient participated in Skilled OT session this date with interventions consisting of ADL re training with the use of correct body mechnaics, Energy Conservation techniques, safety awareness and fall prevention techniques, maintaining spinal precautions,  therapeutic activities to: increase activity tolerance, increase dynamic sit/ stand balance during functional activity  and increase OOB/ sitting tolerance . Upon arrival patient was found supine in bed. Pt demonstrated the following tasks: MOD A x 2 sup to sit, MAX A x 2 STS and slide board transfer. Pt requires total A for hair care and MAX A for LBD. Pt doffs gown with MIN A, MOD A to don gown. Pt educated on spinal precautions - verbalized understanding. Patient continues to be functioning below baseline level, occupational performance remains limited secondary to factors listed above and increased risk for falls and injury. From OT standpoint, recommendation at time of d/c would be STR.   Patient to benefit from continued Occupational Therapy treatment while in the hospital to address deficits as defined above and maximize level of functional independence with ADLs and functional mobility. Pt was left after session with all current needs met. The patient's raw score on the AM-PAC Daily Activity Inpatient Short Form is 12. A raw score of less than 19 suggests the patient may benefit from discharge to post-acute rehabilitation services. Please refer to the recommendation of the Occupational Therapist for safe discharge planning.      OT Discharge Recommendation: Post acute rehabilitation services

## 2023-07-06 NOTE — ASSESSMENT & PLAN NOTE
· Sodium 131 on admission, possibly due to poor recent oral intake and nausea/vomiting  · Now 134>> 132>> increased free water flushes.    · Trend BMP

## 2023-07-06 NOTE — PLAN OF CARE
Problem: Potential for Falls  Goal: Patient will remain free of falls  Description: INTERVENTIONS:  - Educate patient/family on patient safety including physical limitations  - Instruct patient to call for assistance with activity   - Consult OT/PT to assist with strengthening/mobility   - Keep Call bell within reach  - Keep bed low and locked with side rails adjusted as appropriate  - Keep care items and personal belongings within reach  - Initiate and maintain comfort rounds  - Make Fall Risk Sign visible to staff  - Offer Toileting every 2 Hours, in advance of need  - Initiate/Maintain bed alarm  - Apply yellow socks and bracelet for high fall risk patients  - Consider moving patient to room near nurses station  Outcome: Progressing     Problem: MOBILITY - ADULT  Goal: Maintain or return to baseline ADL function  Description: INTERVENTIONS:  -  Assess patient's ability to carry out ADLs; assess patient's baseline for ADL function and identify physical deficits which impact ability to perform ADLs (bathing, care of mouth/teeth, toileting, grooming, dressing, etc.)  - Assess/evaluate cause of self-care deficits   - Assess range of motion  - Assess patient's mobility; develop plan if impaired  - Assess patient's need for assistive devices and provide as appropriate  - Encourage maximum independence but intervene and supervise when necessary  - Involve family in performance of ADLs  - Assess for home care needs following discharge   - Consider OT consult to assist with ADL evaluation and planning for discharge  - Provide patient education as appropriate  Outcome: Progressing  Goal: Maintains/Returns to pre admission functional level  Description: INTERVENTIONS:  - Perform BMAT or MOVE assessment daily.   - Set and communicate daily mobility goal to care team and patient/family/caregiver.    - Collaborate with rehabilitation services on mobility goals if consulted  - Record patient progress and toleration of activity level   Outcome: Progressing     Problem: Prexisting or High Potential for Compromised Skin Integrity  Goal: Skin integrity is maintained or improved  Description: INTERVENTIONS:  - Identify patients at risk for skin breakdown  - Assess and monitor skin integrity  - Assess and monitor nutrition and hydration status  - Monitor labs   - Assess for incontinence   - Turn and reposition patient  - Assist with mobility/ambulation  - Relieve pressure over bony prominences  - Avoid friction and shearing  - Provide appropriate hygiene as needed including keeping skin clean and dry  - Evaluate need for skin moisturizer/barrier cream  - Collaborate with interdisciplinary team   - Patient/family teaching  - Consider wound care consult   Outcome: Progressing     Problem: Nutrition/Hydration-ADULT  Goal: Nutrient/Hydration intake appropriate for improving, restoring or maintaining nutritional needs  Description: Monitor and assess patient's nutrition/hydration status for malnutrition. Collaborate with interdisciplinary team and initiate plan and interventions as ordered. Monitor patient's weight and dietary intake as ordered or per policy. Utilize nutrition screening tool and intervene as necessary. Determine patient's food preferences and provide high-protein, high-caloric foods as appropriate.      INTERVENTIONS:  - Monitor oral intake, urinary output, labs, and treatment plans  - Assess nutrition and hydration status and recommend course of action  - Evaluate amount of meals eaten  - Assist patient with eating if necessary   - Allow adequate time for meals  - Recommend/ encourage appropriate diets, oral nutritional supplements, and vitamin/mineral supplements  - Order, calculate, and assess calorie counts as needed  - Recommend, monitor, and adjust tube feedings and TPN/PPN based on assessed needs  - Assess need for intravenous fluids  - Provide specific nutrition/hydration education as appropriate  - Include patient/family/caregiver in decisions related to nutrition  Outcome: Progressing

## 2023-07-06 NOTE — PLAN OF CARE
Problem: Potential for Falls  Goal: Patient will remain free of falls  Description: INTERVENTIONS:  - Educate patient/family on patient safety including physical limitations  - Instruct patient to call for assistance with activity   - Consult OT/PT to assist with strengthening/mobility   - Keep Call bell within reach  - Keep bed low and locked with side rails adjusted as appropriate  - Keep care items and personal belongings within reach  - Initiate and maintain comfort rounds  - Make Fall Risk Sign visible to staff  - Offer Toileting every 2  Hours, in advance of need  - Initiate/Maintain alarm  - Obtain necessary fall risk management equipment:   - Apply yellow socks and bracelet for high fall risk patients  - Consider moving patient to room near nurses station  Outcome: Progressing

## 2023-07-06 NOTE — PROGRESS NOTES
[address] [facility]  Progress Note  Name: Virginia Kawasaki I  MRN: [id number]  Unit/Bed#: [facility] 926-01 I Date of Admission: 6/25/2023   Date of Service: 7/6/2023 I Hospital Day: 11    Assessment/Plan   * Generalized weakness  Assessment & Plan  · Presenting with increased weakness over the past several days with generalized malaise, 3 days nausea/vomiting as well as intermittent diarrhea. Of note was recently hospitalized here also with generalized weakness and acute cystitis, appears rehab was recommended by PT/OT however patient refused this and went home with home therapies. · Possibly multifactorial in the setting of electrolyte abnormalities, pyelonephritis, bacteremia, dysphagia  · PT/OT  -  Rehab on discharge, CM following  · Status post EGD and PEG tube placement with GI, nutrition consult appreciated    Hyponatremia  Assessment & Plan  · Sodium 131 on admission, possibly due to poor recent oral intake and nausea/vomiting  · Now 134>> 132>> increased free water flushes. · Trend BMP    Bacteremia due to Escherichia coli  Assessment & Plan  · 1 out of 2 blood cultures positive for E. Coli  · Suspect in setting of acute pyelonephritis  · Completed abx course    Pyelonephritis  Assessment & Plan  · Was treated for acute cystitis during recent admission, CT ab/pelvis now with findings suspicious for right pyelonephritis. Patient without complaints of right side abdominal/flank pain and with benign examination. UA with WBCs and bacteria.   Elevated procalcitonin and WBC is noted  · completed abx course,  · Trend WC, temperature curve, hemodynamics    Protein calorie malnutrition (HCC)  Assessment & Plan  Malnutrition Findings:   Adult Malnutrition type:  (suspect acute and chronic components)  Adult Degree of Malnutrition: Malnutrition of moderate degree  Malnutrition Characteristics: Fat loss, Muscle loss, Inadequate energy                  360 Statement: Moderate malnutrition r/t suspect acute and chronic medical conditions contributing as evidenced by PO intake <50% of estimated needs for 5 days, mild muscle loss around clavicles, deltoids, temples; mild buccal fat pad loss. Treatment to be determined pending further workup, family discussion, etc.    BMI Findings: Body mass index is 16.03 kg/m². Compression fracture of L5 vertebra with routine healing  Assessment & Plan  · Again noted on CT imaging worsened from prior examination, additionally with marked compression deformity at L4. Patient denying any increase in back pain from baseline  · Now with acute on chronic pain  · Lidocaine patch , tylenol and robaxin  · Neurosurgery consulted>> Xrays shows stable fracture>> no surgical intervention. Not a candidate for now given recent bacteremia  · Pain control with tylenol. Lidocaine patch and prn robaxin. Dysphagia  Assessment & Plan  · Speech eval appreciated-patient evaluated again by speech today for pleasure feed consideration, patient with ongoing aspiration and high risk, will continue on strict n.p.o. status while inpatient  · VBS shows no swallowing function  · Status post PEG tube placement -tube feeds currently at goal patient is tolerating    Chronic pain syndrome  Assessment & Plan  · Patient reporting no increase in chronic pain at this time  · PDMP reviewed, we will continue with low-dose oxycodone as needed      Rheumatoid arthritis involving multiple sites with positive rheumatoid factor (HCC)  Assessment & Plan  · Home dose Plaquenil 200 mg in a.m. and 100 mg in PM, methotrexate 7.5 mg every Thursday  · Continue home medications             VTE Pharmacologic Prophylaxis:   Pharmacologic: Heparin  Mechanical VTE Prophylaxis in Place: Yes    Patient Centered Rounds: I have performed bedside rounds with nursing staff today.     Discussions with Specialists or Other Care Team Provider: CM    Education and Discussions with Family / Patient: plan of care discussed with patient. Also called and updated niece. Time Spent for Care: 30 minutes. More than 50% of total time spent on counseling and coordination of care as described above. Current Length of Stay: 11 day(s)    Current Patient Status: Inpatient   Certification Statement: The patient will continue to require additional inpatient hospital stay due to Medically stable for discharge pending placement    Discharge Plan: Medically stable for discharge pending placement    Code Status: Level 3 - DNAR and DNI      Subjective:   No overnight events reported. Pain is controlled. No other acute discomfort. Objective:     Vitals:   Temp (24hrs), Av.9 °F (36.6 °C), Min:97.3 °F (36.3 °C), Max:98.2 °F (36.8 °C)    Temp:  [97.3 °F (36.3 °C)-98.2 °F (36.8 °C)] 98.2 °F (36.8 °C)  HR:  [69-80] 80  Resp:  [14-16] 14  BP: (100-101)/(56-58) 100/56  SpO2:  [97 %-100 %] 97 %  Body mass index is 16.03 kg/m². Input and Output Summary (last 24 hours): Intake/Output Summary (Last 24 hours) at 2023 1603  Last data filed at 2023 1508  Gross per 24 hour   Intake 641 ml   Output 616 ml   Net 25 ml       Physical Exam:     Physical Exam  Constitutional:       General: She is not in acute distress. Cardiovascular:      Rate and Rhythm: Normal rate and regular rhythm.      Heart sounds: Normal heart sounds. No murmur heard. Pulmonary:      Effort: No respiratory distress.      Breath sounds: Normal breath sounds. No wheezing or rales.    Abdominal:      General: Bowel sounds are normal. There is no distension.      Palpations: Abdomen is soft.      Tenderness: no tednerness     Comments: PEG site noted   Neurological:      Mental Status: She is alert.      Comments: Awake alert and communicative  Moves all extremities        Additional Data:     Labs:    Results from last 7 days   Lab Units 23  0521   WBC Thousand/uL 7.65   HEMOGLOBIN g/dL 10.9*   HEMATOCRIT % 35.0   PLATELETS Thousands/uL 585*   BANDS PCT % 3   LYMPHO PCT % 8*   MONO PCT % 12   EOS PCT % 4     Results from last 7 days   Lab Units 07/06/23  0521 07/04/23  0451   SODIUM mmol/L 132* 134*   POTASSIUM mmol/L 4.6 4.2   CHLORIDE mmol/L 102 102   CO2 mmol/L 26 30   BUN mg/dL 20 17   CREATININE mg/dL 0.54* 0.46*   ANION GAP mmol/L 4 2   CALCIUM mg/dL 9.8 9.3   ALBUMIN g/dL  --  2.2*   TOTAL BILIRUBIN mg/dL  --  0.29   ALK PHOS U/L  --  103   ALT U/L  --  14   AST U/L  --  10   GLUCOSE RANDOM mg/dL 98 109                           * I Have Reviewed All Lab Data Listed Above. * Additional Pertinent Lab Tests Reviewed: All Labs Within Last 24 Hours Reviewed    Imaging:    XR spine lumbar 2 or 3 views injury   Final Result by Cecille Melchor MD (07/06 7617)      Stable severe L5 compression deformity and moderate L4 compression deformity since 6/25/2023. Stable chronic severe L3 compression deformity since 2/7/2019. Workstation performed: [id number]         XR abdomen 1 vw portable   Final Result by Lorne Singletary MD (07/05 0845)   Nonobstructive bowel gas pattern. Known 3 cm calculus in the right collecting system. Percutaneous gastrostomy tube in situ. Workstation performed: [id number]         FL barium swallow video w speech   Final Result by TL Carreon (06/27 1107)      CT chest abdomen pelvis w contrast   Final Result by Anival Lynn MD (06/25 2005)      1. No acute pulmonary findings. 2.  New patulous distention of the upper esophagus. Scattered debris in the esophagus, question related to reflux with esophageal dysfunction not excluded. 3.  New findings suspicious for right-sided pyelonephritis. 4. Moderate compression deformity at L4 is new. Severe compression deformity at L5 is worse from the prior exam previously mild. 5.  Heterogeneous soft tissue fullness at the left upper chest wall adjacent to the left anterior first and second ribs.   The overall fullness does appear similar to the prior noncontrast exam, question posttraumatic and related to old hematoma but given    the heterogeneity recommend follow-up contrast chest CT in 3 months. 6.  Bilateral shoulder prostheses identified with chronic bilateral anterior shoulder dislocation. The study was marked in [facility] for immediate notification. Workstation performed: [id number]         XR chest 1 view portable   Final Result by Noah Devlin MD (06/26 0357)      No acute cardiopulmonary disease. 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          Workstation performed: [id number]               Recent Cultures (last 7 days):           Last 24 Hours Medication List:   Current Facility-Administered Medications   Medication Dose Route Frequency Provider Last Rate   • acetaminophen  975 mg Oral [facility] Milla Blankenship MD     • bisacodyl  10 mg Rectal Daily PRN Milla Blankenship MD     • Diclofenac Sodium  2 g Topical 4x Daily Raisa Jennings PA-C     • folic acid  2 mg Per PEG Tube Daily Bassem Santillan DO     • gabapentin  200 mg Per PEG Tube TID Bassem Santillan DO     • heparin (porcine)  5,000 Units Subcutaneous [facility] Leila Doran DO     • HYDROmorphone  0.2 mg Intravenous Q4H PRN Milla Blankenship MD     • hydroxychloroquine  200 mg Per PEG Tube Daily With Breakfast Bassem Santillan DO      And   • hydroxychloroquine  100 mg Per PEG Tube Daily With [employer], DO     • levothyroxine  75 mcg Per PEG Tube Early Morning Bassem Santillan DO     • lidocaine  1 patch Topical Daily Milla Blankenship MD     • melatonin  3 mg Per PEG Tube HS Bassem Santillan DO     • methocarbamol  500 mg Per PEG Tube Daily PRN Bassem Santillan DO     • nortriptyline  100 mg Per PEG Tube HS Bassem Santillan DO     • ondansetron  4 mg Intravenous Q6H PRN Leila Doran DO     • oxyCODONE  2.5 mg Per PEG Tube Q4H PRN Rachelle Santos PA-C     • pantoprazole  40 mg Intravenous Q24H [address] Hillary Roldan PA-C     • polyethylene glycol  17 g Per PEG Tube Daily Anoop Peraza MD     • rOPINIRole  2 mg Per G Tube HS Bassem Santillan DO     • senna-docusate sodium  1 tablet Oral BID Anoop Peraza MD          Today, Patient Was Seen By: Anoop Peraza MD    ** Please Note: Dictation voice to text software may have been used in the creation of this document.  **

## 2023-07-06 NOTE — PHYSICAL THERAPY NOTE
PHYSICAL THERAPY NOTE          Patient Name: Nickie Sutherland  Today's Date: 7/6/2023 07/06/23 1041   PT Last Visit   PT Visit Date 07/06/23   Note Type   Note Type Treatment   Pain Assessment   Pain Assessment Tool 0-10   Pain Score 8   Pain Location/Orientation Location: Back   Hospital Pain Intervention(s) Repositioned; Ambulation/increased activity; Emotional support   Restrictions/Precautions   Weight Bearing Precautions Per Order   (Per chart review, pt with chronic R shoulder dislocation/subluxation. Pt was WBAT on previous admissions; per ortho, cleared for RW)   Braces or Orthoses   (no brace per neurosx)   Other Precautions (S)  Chair Alarm; Bed Alarm;Multiple lines; Fall Risk;Pain;Spinal precautions  (L4, L5 fx, b/l shoulder dislocation)   General   Chart Reviewed Yes   Response to Previous Treatment Patient with no complaints from previous session. Family/Caregiver Present No   Cognition   Overall Cognitive Status WFL   Arousal/Participation Responsive;Arousable; Uncooperative   Attention Within functional limits   Orientation Level Oriented X4   Following Commands Follows one step commands with increased time or repetition   Comments pt pleasant and cooperative throughout therapy session   Bed Mobility   Supine to Sit 3  Moderate assistance   Additional items Assist x 2; Increased time required;Verbal cues;LE management;HOB elevated   Sit to Supine Unable to assess   Additional Comments pt supine in bed upon arrival. Pt returned to sitting OOB in recliner with all needs wihtin reach   Transfers   Sit to Stand 2  Maximal assistance   Additional items Assist x 2; Increased time required;Verbal cues   Stand to Sit 2  Maximal assistance   Additional items Assist x 2; Increased time required;Verbal cues   Sliding Board transfer 2  Maximal assistance   Additional items Assist x 2; Increased time required;Verbal cues   Additional Comments transfers with HHA, able to achieve ~75% clearance. Slide board utilixed to transfer into drop arm recliner. Updated restorative/ nursing staff to slide back to bed with smooth  board. Ambulation/Elevation   Gait pattern Not appropriate  (2* poor standing tolerance/ balance)   Balance   Static Sitting Fair -   Dynamic Sitting Poor +   Static Standing Poor -   Endurance Deficit   Endurance Deficit Yes   Endurance Deficit Description generalized weakness, deconditioning   Activity Tolerance   Activity Tolerance Patient tolerated treatment well   Medical Staff Made Aware OT Dru Castellano; co-session completed this date 2* increased medical complexity and multiple co-morbidities   Nurse Made Aware RN cleared pt to participate in theray session   Exercises   Knee AROM Long Arc Quad Sitting;Bilateral;10 reps;AROM   Ankle Pumps Sitting;Bilateral;10 reps;AROM   Marching Sitting;Bilateral;10 reps;AAROM   Balance training  sitting EOB ~ 15 min with occasional CGA/ min A reuqired to prevent posterior lean   Assessment   Prognosis Fair   Problem List Decreased strength;Decreased range of motion;Decreased endurance; Impaired balance;Decreased coordination;Decreased mobility;Pain   Assessment Pt seen for PT treatment session this date. Therapy session focused on bed mobility, sitting balance, functional transfers, and therex in order to improve overall mobility and independence. Pt requires assist of 2 for all mobility performed this date. Pt educated on and complaint with spinal precautions, utilized log roll to get OOB. Pt continues to be limited by b/l shoulder pain/ subluxation as well as back pain. Slide board utilized to ease transfer, completes with max Ax2. Pt making slow progress toward goals. Pt was left sitting OOB in recliner at the end of PT session with all needs in reach. Pt would benefit from continued PT services while in hospital to address remaining limitations.  PT to continue to follow pt and recommends rehab. The patient's AM-PAC Basic Mobility Inpatient Short Form Raw Score is 7. A Raw score of less than or equal to 16 suggests the patient may benefit from discharge to post-acute rehabilitation services. Please also refer to the recommendation of the Physical Therapist for safe discharge planning. Barriers to Discharge Inaccessible home environment   Goals   Patient Goals to feel better   STG Expiration Date 07/18/23   PT Treatment Day 1   Plan   Treatment/Interventions Functional transfer training;LE strengthening/ROM; Therapeutic exercise;Patient/family training;Equipment eval/education; Endurance training;Bed mobility;Spoke to nursing;Spoke to case management;OT   Progress Slow progress, decreased activity tolerance   PT Frequency 2-3x/wk   Recommendation   PT Discharge Recommendation Post acute rehabilitation services   AM-PAC Basic Mobility Inpatient   Turning in Flat Bed Without Bedrails 2   Lying on Back to Sitting on Edge of Flat Bed Without Bedrails 1   Moving Bed to Chair 1   Standing Up From Chair Using Arms 1   Walk in Room 1   Climb 3-5 Stairs With Railing 1   Basic Mobility Inpatient Raw Score 7   Highest Level Of Mobility   JH-HLM Goal 2: Bed activities/Dependent transfer   JH-HLM Achieved 4: Move to chair/commode   Education   Education Provided Mobility training   Patient Demonstrates acceptance/verbal understanding   End of Consult   Patient Position at End of Consult Bedside chair;Bed/Chair alarm activated; All needs within reach     Binu Cooley, PT, DPT

## 2023-07-06 NOTE — PLAN OF CARE
Problem: PHYSICAL THERAPY ADULT  Goal: Performs mobility at highest level of function for planned discharge setting. See evaluation for individualized goals. Description: Treatment/Interventions: Functional transfer training, LE strengthening/ROM, Therapeutic exercise, Endurance training, Patient/family training, Equipment eval/education, Bed mobility, Gait training, Spoke to nursing, Spoke to case management, OT          See flowsheet documentation for full assessment, interventions and recommendations. Outcome: Progressing  Note: Prognosis: Fair  Problem List: Decreased strength, Decreased range of motion, Decreased endurance, Impaired balance, Decreased coordination, Decreased mobility, Pain  Assessment: Pt seen for PT treatment session this date. Therapy session focused on bed mobility, sitting balance, functional transfers, and therex in order to improve overall mobility and independence. Pt requires assist of 2 for all mobility performed this date. Pt educated on and complaint with spinal precautions, utilized log roll to get OOB. Pt continues to be limited by b/l shoulder pain/ subluxation as well as back pain. Slide board utilized to ease transfer, completes with max Ax2. Pt making slow progress toward goals. Pt was left sitting OOB in recliner at the end of PT session with all needs in reach. Pt would benefit from continued PT services while in hospital to address remaining limitations. PT to continue to follow pt and recommends rehab. The patient's AM-PAC Basic Mobility Inpatient Short Form Raw Score is 7. A Raw score of less than or equal to 16 suggests the patient may benefit from discharge to post-acute rehabilitation services. Please also refer to the recommendation of the Physical Therapist for safe discharge planning. Barriers to Discharge: Inaccessible home environment     PT Discharge Recommendation: Post acute rehabilitation services    See flowsheet documentation for full assessment.

## 2023-07-06 NOTE — PROGRESS NOTES
tolerating continuous TF; free water flushes slightly increased today (total free water ~1300ml from TF product and flushes) ; hyponatremia noted  Recommend:  continue current TF plan at this time; follow serial labs (sodium), may need to adjust free water flushes if Na decreases further; request weekly weights on Mondays

## 2023-07-07 LAB
ANION GAP SERPL CALCULATED.3IONS-SCNC: 5 MMOL/L
BUN SERPL-MCNC: 24 MG/DL (ref 5–25)
CALCIUM SERPL-MCNC: 9.3 MG/DL (ref 8.3–10.1)
CHLORIDE SERPL-SCNC: 102 MMOL/L (ref 96–108)
CO2 SERPL-SCNC: 29 MMOL/L (ref 21–32)
CREAT SERPL-MCNC: 0.58 MG/DL (ref 0.6–1.3)
GFR SERPL CREATININE-BSD FRML MDRD: 87 ML/MIN/1.73SQ M
GLUCOSE SERPL-MCNC: 120 MG/DL (ref 65–140)
POTASSIUM SERPL-SCNC: 4 MMOL/L (ref 3.5–5.3)
SODIUM SERPL-SCNC: 136 MMOL/L (ref 135–147)

## 2023-07-07 PROCEDURE — 99232 SBSQ HOSP IP/OBS MODERATE 35: CPT | Performed by: INTERNAL MEDICINE

## 2023-07-07 PROCEDURE — 92526 ORAL FUNCTION THERAPY: CPT

## 2023-07-07 PROCEDURE — 97112 NEUROMUSCULAR REEDUCATION: CPT

## 2023-07-07 PROCEDURE — 80048 BASIC METABOLIC PNL TOTAL CA: CPT | Performed by: INTERNAL MEDICINE

## 2023-07-07 PROCEDURE — 97110 THERAPEUTIC EXERCISES: CPT

## 2023-07-07 PROCEDURE — 99221 1ST HOSP IP/OBS SF/LOW 40: CPT | Performed by: OTOLARYNGOLOGY

## 2023-07-07 PROCEDURE — 97530 THERAPEUTIC ACTIVITIES: CPT

## 2023-07-07 PROCEDURE — C9113 INJ PANTOPRAZOLE SODIUM, VIA: HCPCS | Performed by: PHYSICIAN ASSISTANT

## 2023-07-07 RX ADMIN — DICLOFENAC SODIUM 2 G: 10 GEL TOPICAL at 10:03

## 2023-07-07 RX ADMIN — Medication 2.5 MG: at 17:08

## 2023-07-07 RX ADMIN — NORTRIPTYLINE HYDROCHLORIDE 100 MG: 50 CAPSULE ORAL at 21:12

## 2023-07-07 RX ADMIN — GABAPENTIN 200 MG: 100 CAPSULE ORAL at 21:10

## 2023-07-07 RX ADMIN — HEPARIN SODIUM 5000 UNITS: 5000 INJECTION INTRAVENOUS; SUBCUTANEOUS at 06:20

## 2023-07-07 RX ADMIN — HYDROXYCHLOROQUINE SULFATE 200 MG: 200 TABLET ORAL at 10:02

## 2023-07-07 RX ADMIN — GABAPENTIN 200 MG: 100 CAPSULE ORAL at 17:07

## 2023-07-07 RX ADMIN — HEPARIN SODIUM 5000 UNITS: 5000 INJECTION INTRAVENOUS; SUBCUTANEOUS at 21:10

## 2023-07-07 RX ADMIN — Medication 2.5 MG: at 10:03

## 2023-07-07 RX ADMIN — ROPINIROLE 2 MG: 2 TABLET, FILM COATED ORAL at 21:10

## 2023-07-07 RX ADMIN — HYDROXYCHLOROQUINE SULFATE 100 MG: 200 TABLET ORAL at 17:08

## 2023-07-07 RX ADMIN — ACETAMINOPHEN 975 MG: 325 TABLET, FILM COATED ORAL at 06:20

## 2023-07-07 RX ADMIN — SENNOSIDES AND DOCUSATE SODIUM 1 TABLET: 50; 8.6 TABLET ORAL at 17:07

## 2023-07-07 RX ADMIN — LEVOTHYROXINE SODIUM 75 MCG: 75 TABLET ORAL at 06:20

## 2023-07-07 RX ADMIN — Medication 2.5 MG: at 13:50

## 2023-07-07 RX ADMIN — MELATONIN TAB 3 MG 3 MG: 3 TAB at 21:10

## 2023-07-07 RX ADMIN — PANTOPRAZOLE SODIUM 40 MG: 40 INJECTION, POWDER, FOR SOLUTION INTRAVENOUS at 10:03

## 2023-07-07 RX ADMIN — ACETAMINOPHEN 975 MG: 325 TABLET, FILM COATED ORAL at 13:05

## 2023-07-07 RX ADMIN — DICLOFENAC SODIUM 2 G: 10 GEL TOPICAL at 21:11

## 2023-07-07 RX ADMIN — DICLOFENAC SODIUM 2 G: 10 GEL TOPICAL at 17:07

## 2023-07-07 RX ADMIN — DICLOFENAC SODIUM 2 G: 10 GEL TOPICAL at 11:25

## 2023-07-07 RX ADMIN — GABAPENTIN 200 MG: 100 CAPSULE ORAL at 10:02

## 2023-07-07 RX ADMIN — POLYETHYLENE GLYCOL 3350 17 G: 17 POWDER, FOR SOLUTION ORAL at 10:03

## 2023-07-07 RX ADMIN — ACETAMINOPHEN 975 MG: 325 TABLET, FILM COATED ORAL at 21:10

## 2023-07-07 RX ADMIN — SENNOSIDES AND DOCUSATE SODIUM 1 TABLET: 50; 8.6 TABLET ORAL at 10:02

## 2023-07-07 RX ADMIN — FOLIC ACID 2 MG: 1 TABLET ORAL at 10:02

## 2023-07-07 RX ADMIN — HEPARIN SODIUM 5000 UNITS: 5000 INJECTION INTRAVENOUS; SUBCUTANEOUS at 13:05

## 2023-07-07 RX ADMIN — Medication 2.5 MG: at 21:10

## 2023-07-07 RX ADMIN — METHOCARBAMOL TABLETS 500 MG: 500 TABLET, COATED ORAL at 17:07

## 2023-07-07 RX ADMIN — Medication 2.5 MG: at 06:22

## 2023-07-07 NOTE — PHYSICAL THERAPY NOTE
Physical Therapy Progress Note     07/07/23 1145   PT Last Visit   PT Visit Date 07/07/23   Note Type   Note Type Treatment   Pain Assessment   Pain Assessment Tool 0-10   Pain Score 7   Pain Location/Orientation Location: Back   Hospital Pain Intervention(s) Repositioned; Ambulation/increased activity; Emotional support   Restrictions/Precautions   Other Precautions Spinal precautions;Pain; Fall Risk;Bed Alarm; Chair Alarm   Subjective   Subjective The patient notes continued pain and fatigue. Bed Mobility   Supine to Sit 2  Maximal assistance   Additional items Assist x 1;HOB elevated; Increased time required;Verbal cues;LE management   Sit to Supine 3  Moderate assistance   Additional items Assist x 1; Increased time required;LE management;Verbal cues   Transfers   Sit to Stand 2  Maximal assistance   Additional items Assist x 1; Increased time required;Verbal cues   Stand to Sit 2  Maximal assistance   Additional items Assist x 1; Increased time required;Verbal cues   Sit pivot 2  Maximal assistance   Additional items Assist x 1; Increased time required;Verbal cues  (x2 trials.)   Balance   Static Sitting Fair   Dynamic Sitting Fair -   Static Standing Poor   Dynamic Standing Poor -   Activity Tolerance   Activity Tolerance Patient limited by fatigue;Patient limited by pain   Exercises   Knee AROM Long Arc Quad Sitting;10 reps;Bilateral;AROM   Ankle Pumps Sitting;15 reps;Bilateral;AROM   Marching Sitting;10 reps;AROM; Bilateral  (x2 sets.)   Assessment   Prognosis Fair   Problem List Decreased strength;Decreased range of motion;Decreased endurance; Impaired balance;Decreased coordination;Decreased mobility;Pain   Assessment The patient continues to require assistance due to pain, decreased strength, balance, and activity tolerance. She was able to come to full standing with the fourth trial, but she fatigues very quickly. Performed a pivot to the chair and then back as she continued to fatigue.  Continue to recommend rehab in order to safely progress her functional mobility. Barriers to Discharge Inaccessible home environment;Decreased caregiver support   Goals   Patient Goals To have less pain. STG Expiration Date 07/18/23   PT Treatment Day 2   Plan   Treatment/Interventions Functional transfer training;LE strengthening/ROM; Therapeutic exercise; Endurance training;Patient/family training;Bed mobility;Gait training   Progress Slow progress, decreased activity tolerance   PT Frequency 2-3x/wk   Recommendation   PT Discharge Recommendation Post acute rehabilitation services   AM-PAC Basic Mobility Inpatient   Turning in Flat Bed Without Bedrails 2   Lying on Back to Sitting on Edge of Flat Bed Without Bedrails 2   Moving Bed to Chair 2   Standing Up From Chair Using Arms 2   Walk in Room 1   Climb 3-5 Stairs With Railing 1   Basic Mobility Inpatient Raw Score 10   Turning Head Towards Sound 4   Follow Simple Instructions 4   Low Function Basic Mobility Raw Score  18   Low Function Basic Mobility Standardized Score  29.25   Highest Level Of Mobility   -HLM Goal 4: Move to chair/commode   JH-HLM Achieved 5: Stand (1 or more minutes)         An AM-PAC Basic Mobility raw score less than 16 suggests the patient may benefit from discharge to post-acute rehab services.     Francisco Greer, PTA

## 2023-07-07 NOTE — PROGRESS NOTES
-- Patient: Dante Sandhoff  -- MRN: [id number]  -- Aidin Request ID: [id number]  -- Level of care reserved: [address]  -- Partner Reserved: [address] [place] [address], [place] ([place]), [address] [facility] [phone]  -- Clinical needs requested:  -- Geography searched: 10 miles around [phone]  -- Start of Service:  -- Request sent: 9:44am EDT on 7/5/2023 by Sheyla Hammer  -- Partner reserved: 12:21pm EDT on 7/7/2023 by Sheyla Hammer  -- Choice list shared: 12:21pm EDT on 7/7/2023 by Sheyla Hammer

## 2023-07-07 NOTE — PROGRESS NOTES
[facility]  Progress Note  Name: Prasad Serrano I  MRN: [id number]  Unit/Bed#: [facility] 926-01 I Date of Admission: 6/25/2023   Date of Service: 7/7/2023 I Hospital Day: 12    Assessment/Plan   * Generalized weakness  Assessment & Plan  · Presenting with increased weakness over the past several days with generalized malaise, 3 days nausea/vomiting as well as intermittent diarrhea. Of note was recently hospitalized here also with generalized weakness and acute cystitis, appears rehab was recommended by PT/OT however patient refused this and went home with home therapies. · Possibly multifactorial in the setting of electrolyte abnormalities, pyelonephritis, bacteremia, dysphagia  · PT/OT  -  Rehab on discharge, CM following  · Status post EGD and PEG tube placement with GI, nutrition consult appreciated    Hyponatremia  Assessment & Plan  · Sodium 131 on admission, possibly due to poor recent oral intake and nausea/vomiting  · Now 134>> 132>> increased free water flushes. >> improved to 136. · Trend BMP    Bacteremia due to Escherichia coli  Assessment & Plan  · 1 out of 2 blood cultures positive for E. Coli  · Suspect in setting of acute pyelonephritis  · Completed abx course    Pyelonephritis  Assessment & Plan  · Was treated for acute cystitis during recent admission, CT ab/pelvis now with findings suspicious for right pyelonephritis. Patient without complaints of right side abdominal/flank pain and with benign examination. UA with WBCs and bacteria.   Elevated procalcitonin and WBC is noted  · completed abx course,  · Trend WC, temperature curve, hemodynamics    Protein calorie malnutrition (HCC)  Assessment & Plan  Malnutrition Findings:   Adult Malnutrition type:  (suspect acute and chronic components)  Adult Degree of Malnutrition: Malnutrition of moderate degree  Malnutrition Characteristics: Fat loss, Muscle loss, Inadequate energy                  360 Statement: Moderate malnutrition r/t suspect acute and chronic medical conditions contributing as evidenced by PO intake <50% of estimated needs for 5 days, mild muscle loss around clavicles, deltoids, temples; mild buccal fat pad loss. Treatment to be determined pending further workup, family discussion, etc.    BMI Findings: Body mass index is 18.31 kg/m². Compression fracture of L5 vertebra with routine healing  Assessment & Plan  · Again noted on CT imaging worsened from prior examination, additionally with marked compression deformity at L4. Patient denying any increase in back pain from baseline  · Now with acute on chronic pain  · Lidocaine patch , tylenol and robaxin  · Neurosurgery consulted>> Xrays shows stable fracture>> no surgical intervention. Not a candidate for now given recent bacteremia  · Pain control with tylenol. Lidocaine patch and prn robaxin. Dysphagia  Assessment & Plan  · Speech eval appreciated-patient evaluated again by speech today for pleasure feed consideration, patient with ongoing aspiration and high risk, will continue on strict n.p.o. status while inpatient  · VBS shows no swallowing function  · Status post PEG tube placement -tube feeds currently at goal patient is tolerating  · Swallow re evaluated>> recommend ENt consult to see if patient is a candidate for myotomy.  ENT consulted,    Chronic pain syndrome  Assessment & Plan  · Patient reporting no increase in chronic pain at this time  · PDMP reviewed, we will continue with low-dose oxycodone as needed      Rheumatoid arthritis involving multiple sites with positive rheumatoid factor (HCC)  Assessment & Plan  · Home dose Plaquenil 200 mg in a.m. and 100 mg in PM, methotrexate 7.5 mg every Thursday  · Continue home medications             VTE Pharmacologic Prophylaxis:   Pharmacologic: Heparin  Mechanical VTE Prophylaxis in Place: Yes    Patient Centered Rounds: I have performed bedside rounds with nursing staff today.    Discussions with Specialists or Other Care Team Provider: CM, swallow team    Education and Discussions with Family / Patient: plan of care, patient. And niece at bedside. Time Spent for Care: 30 minutes. More than 50% of total time spent on counseling and coordination of care as described above. Current Length of Stay: 12 day(s)    Current Patient Status: Inpatient   Certification Statement: The patient will continue to require additional inpatient hospital stay due to opendig ENT eval    Discharge Plan: likely to [facility] tomorrow if ent clears and if bed available     Code Status: Level 3 - DNAR and DNI      Subjective:   No overnight events. No acute complaints. Requested repeat swallow eval.      Objective:     Vitals:   Temp (24hrs), Av.1 °F (36.7 °C), Min:98 °F (36.7 °C), Max:98.2 °F (36.8 °C)    Temp:  [98 °F (36.7 °C)-98.2 °F (36.8 °C)] 98 °F (36.7 °C)  HR:  [70-80] 70  Resp:  [14-18] 17  BP: ()/(55-57) 114/57  SpO2:  [97 %-99 %] 97 %  Body mass index is 18.31 kg/m². Input and Output Summary (last 24 hours): Intake/Output Summary (Last 24 hours) at 2023 1334  Last data filed at 2023 1305  Gross per 24 hour   Intake 1072 ml   Output 300 ml   Net 772 ml       Physical Exam:     Physical Exam  Constitutional:       General: She is not in acute distress. Cardiovascular:      Rate and Rhythm: Normal rate and regular rhythm.      Heart sounds: Normal heart sounds. No murmur heard. Pulmonary:      Effort: No respiratory distress.      Breath sounds: Normal breath sounds. No wheezing or rales.    Abdominal:      General: Bowel sounds are normal. There is no distension.      Palpations: Abdomen is soft.      Tenderness: no tednerness     Comments: PEG site noted   Neurological:      Mental Status: She is alert.      Comments: Awake alert and communicative  Moves all extremities     Additional Data:     Labs:    Results from last 7 days   Lab Units 23  0521   WBC Thousand/uL 7.65   HEMOGLOBIN g/dL 10.9*   HEMATOCRIT % 35.0   PLATELETS Thousands/uL 585*   BANDS PCT % 3   LYMPHO PCT % 8*   MONO PCT % 12   EOS PCT % 4     Results from last 7 days   Lab Units 07/07/23  0603 07/06/23  0521 07/04/23  0451   SODIUM mmol/L 136   < > 134*   POTASSIUM mmol/L 4.0   < > 4.2   CHLORIDE mmol/L 102   < > 102   CO2 mmol/L 29   < > 30   BUN mg/dL 24   < > 17   CREATININE mg/dL 0.58*   < > 0.46*   ANION GAP mmol/L 5   < > 2   CALCIUM mg/dL 9.3   < > 9.3   ALBUMIN g/dL  --   --  2.2*   TOTAL BILIRUBIN mg/dL  --   --  0.29   ALK PHOS U/L  --   --  103   ALT U/L  --   --  14   AST U/L  --   --  10   GLUCOSE RANDOM mg/dL 120   < > 109    < > = values in this interval not displayed. * I Have Reviewed All Lab Data Listed Above. * Additional Pertinent Lab Tests Reviewed: All Labs Within Last 24 Hours Reviewed    Imaging:    XR spine lumbar 2 or 3 views injury   Final Result by Kellie Davila MD (07/06 1457)      Stable severe L5 compression deformity and moderate L4 compression deformity since 6/25/2023. Stable chronic severe L3 compression deformity since 2/7/2019. Workstation performed: [id number]         XR abdomen 1 vw portable   Final Result by Lizzie Rain MD (07/05 0845)   Nonobstructive bowel gas pattern. Known 3 cm calculus in the right collecting system. Percutaneous gastrostomy tube in situ. Workstation performed: [id number]         FL barium swallow video w speech   Final Result by TL Alfaro (06/27 1107)      CT chest abdomen pelvis w contrast   Final Result by Mar Almonte MD (06/25 2005)      1. No acute pulmonary findings. 2.  New patulous distention of the upper esophagus. Scattered debris in the esophagus, question related to reflux with esophageal dysfunction not excluded. 3.  New findings suspicious for right-sided pyelonephritis. 4. Moderate compression deformity at L4 is new.  Severe compression deformity at L5 is worse from the prior exam previously mild. 5.  Heterogeneous soft tissue fullness at the left upper chest wall adjacent to the left anterior first and second ribs. The overall fullness does appear similar to the prior noncontrast exam, question posttraumatic and related to old hematoma but given    the heterogeneity recommend follow-up contrast chest CT in 3 months. 6.  Bilateral shoulder prostheses identified with chronic bilateral anterior shoulder dislocation. The study was marked in [facility] for immediate notification. Workstation performed: [id number]         XR chest 1 view portable   Final Result by Della Montez MD (06/26 4511)      No acute cardiopulmonary disease. 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          Workstation performed: [id number]             Recent Cultures (last 7 days):           Last 24 Hours Medication List:   Current Facility-Administered Medications   Medication Dose Route Frequency Provider Last Rate   • acetaminophen  975 mg Oral [facility] Risa Cho MD     • bisacodyl  10 mg Rectal Daily PRN Risa Cho MD     • Diclofenac Sodium  2 g Topical 4x Daily David Greco PA-C     • folic acid  2 mg Per PEG Tube Daily Bassem Santillan, DO     • gabapentin  200 mg Per PEG Tube TID Bassem Santillan, DO     • heparin (porcine)  5,000 Units Subcutaneous [facility] Sridhar Mari DO     • HYDROmorphone  0.2 mg Intravenous Q4H PRN Risa Cho MD     • hydroxychloroquine  200 mg Per PEG Tube Daily With Breakfast Bassem Santillan, DO      And   • hydroxychloroquine  100 mg Per PEG Tube Daily With [employer], DO     • levothyroxine  75 mcg Per PEG Tube Early Morning Bassemquynh Santillan, DO     • lidocaine  1 patch Topical Daily Risa Cho MD     • melatonin  3 mg Per PEG Tube HS Bassem Santillan, DO     • methocarbamol  500 mg Per PEG Tube Daily PRN Bassem Santillan, DO     • nortriptyline  100 mg Per PEG Tube HS Bassem Santillan DO     • ondansetron  4 mg Intravenous Q6H PRN Judy Howard DO     • oxyCODONE  2.5 mg Per PEG Tube Q4H PRN Teresa Burkett PA-C     • pantoprazole  40 mg Intravenous Q24H [address]  Hillary Roldan PA-C     • polyethylene glycol  17 g Per PEG Tube Daily Britney Messina MD     • rOPINIRole  2 mg Per G Tube HS Bassem Santillan DO     • senna-docusate sodium  1 tablet Oral BID Britney Messina MD          Today, Patient Was Seen By: Britney Messina MD    ** Please Note: Dictation voice to text software may have been used in the creation of this document.  **

## 2023-07-07 NOTE — ASSESSMENT & PLAN NOTE
· Sodium 131 on admission, possibly due to poor recent oral intake and nausea/vomiting  · Now 134>> 132>> increased free water flushes. >> improved to 136.   · Trend BMP

## 2023-07-07 NOTE — SPEECH THERAPY NOTE
Speech Language/Pathology    Speech/Language Pathology Progress Note    Patient Name: Miriam Young  Today's Date: 7/7/2023     Problem List  Principal Problem:    Generalized weakness  Active Problems:    Rheumatoid arthritis involving multiple sites with positive rheumatoid factor (HCC)    Chronic pain syndrome    Hyponatremia    Dysphagia    Compression fracture of L5 vertebra with routine healing    Protein calorie malnutrition (HCC)    Pyelonephritis    Hypokalemia    Bacteremia due to Escherichia coli    L4 vertebral fracture [facility])       Past Medical History  Past Medical History:   Diagnosis Date   • Acute blood loss anemia 9/9/2020   • Aftercare following joint replacement 9/9/2020    Patient had right reversed total shoulder arthroplasty.  Pain was controlled when he left the hospital.     • Anxiety    • Arthritis    • Cellulitis of left lower extremity 11/30/2019   • Depression    • Disease of thyroid gland     HYPO   • Dyspepsia 11/12/2019   • Dysphagia 2/6/2023   • Femur neck fracture (HCC)    • GERD (gastroesophageal reflux disease)    • Hyperthyroidism     RESOLVED: [id number]   • Hyponatremia 7/25/2022   • Leg pain 2/6/2023   • Osteoporosis    • Polio    • PVD (peripheral vascular disease) ([address])    • Right BBB/left ant fasc block    • Shoulder pain, bilateral 7/27/2021   • UTI (urinary tract infection)    • Varicella infection     LAST ASSESSED: [id number]        Past Surgical History  Past Surgical History:   Procedure Laterality Date   • APPENDECTOMY     • HIP ARTHROPLASTY Left 11/27/2017    Procedure: REMOVAL IM NAIL CONVERSION TO TOTAL HIP ARTHROPLASTY POSTERIOR APPROACH;  Surgeon: Loly Gudino MD;  [facility];  Service: Orthopedics   • HIP FRACTURE SURGERY     • HIP SURGERY      both hips replaced;2013 left ,2014 right   • JOINT REPLACEMENT Right     HIP TOTAL   • [place] ARTHROPLASTY GLENOHUMERAL JOINT TOTAL SHOULDER Right 9/2/2020    Procedure: ARTHROPLASTY SHOULDER REVERSE;  Surgeon: Hillary Locke MD;  [facility];  Service: Orthopedics   • [place] ARTHROPLASTY GLENOHUMERAL JOINT TOTAL SHOULDER Left 6/1/2021    Procedure: ARTHROPLASTY SHOULDER REVERSE;  Surgeon: Hillary Locke MD;  [facility];  Service: Orthopedics   • [place] CONV PREV HIP TOT HIP ARTHRP W/WO AGRFT/ALGRFT Left 10/4/2017    Procedure: Quiana Nelson removal of left hip;  Surgeon: Bert Gilliam MD;  [facility];  Service: Orthopedics   • [place] [address] WRST SURG W/RLS TRANSVRS CARPL LIGM Right 5/24/2022    Procedure: RELEASE CARPAL TUNNEL ENDOSCOPIC-right;  Surgeon: Leyda Tsai MD;  [facility];  Service: Orthopedics   • REVISION TOTAL HIP ARTHROPLASTY Right    • TOE AMPUTATION Left 7/24/2022    Procedure: 5TH METATARSAL RESECTION WITH BOTTOM FLAP CLOSURE;  Surgeon: Gloria Kearney DPM;  Location: [facility];  Service: Podiatry   • TONSILLECTOMY           Subjective:  Pt seen for dysphagia tx. Pt was positioned upright in bed, pleasant and alert. Objective:  Received new order for swallow eval. Messaged with physician, and spoke with nurse. Pt's niece asked if pt's swallow could be reassessed for possible PO. Reviewed chart, including VBS on 6/27/23, which showed severe oropharyngeal dysphagia, with tight PES, resulting in silent aspiration of all consistencies. Pt received a PEG for nutrition last week. Assisted pt with brushing her teeth. She followed directions well and did not attempt to swallow the toothpaste or water rinse, and was able to expectorate into basin. Offered ice chips but pt declined, stating they hurt her teeth. She was given small amounts of thin water via half teaspoon, full teaspoon, and small straw sips. She ate a few bites of jello and pudding. Swallows appeared fairly prompt. Hyolaryngeal elevation was observed and palpated, suspect reduced movement. Initially swallows appeared fairly effortless, but as PO trials progressed swallows appeared more effortful.  Pt indicated she felt that she had to put effort into the swallows due to globus sensation. There were no overt s/s of aspiration (however pt silently aspirated during VBS), sats remained in the upper 90s. Discussed possibility of consulting with ENT about CP bar. In the past pt was resistant, but now would like to have ENT see if anything can be done to improve her swallow. Discussed with nurse and messaged with physician with recommendation. Pt verbalized understanding, but then asked if she could have water. Assessment:  No overt s/s of aspiration with small amounts of water, pudding, and jello. However pt silently aspirated during last VBS. Pt with effortful swallows due to globus sensation. ?if pt may be a candidate for CP myotomy. Pt is now willing to see ENT. Plan/Recommendations:  Continue NPO for now. Frequent oral care. Recommend ENT consult. ST to follow up after ENT sees pt.

## 2023-07-07 NOTE — PLAN OF CARE
Problem: PHYSICAL THERAPY ADULT  Goal: Performs mobility at highest level of function for planned discharge setting. See evaluation for individualized goals. Description: Treatment/Interventions: Functional transfer training, LE strengthening/ROM, Therapeutic exercise, Endurance training, Patient/family training, Equipment eval/education, Bed mobility, Gait training, Spoke to nursing, Spoke to case management, OT          See flowsheet documentation for full assessment, interventions and recommendations. Outcome: Progressing  Note: Prognosis: Fair  Problem List: Decreased strength, Decreased range of motion, Decreased endurance, Impaired balance, Decreased coordination, Decreased mobility, Pain  Assessment: The patient continues to require assistance due to pain, decreased strength, balance, and activity tolerance. She was able to come to full standing with the fourth trial, but she fatigues very quickly. Performed a pivot to the chair and then back as she continued to fatigue. Continue to recommend rehab in order to safely progress her functional mobility. Barriers to Discharge: Inaccessible home environment, Decreased caregiver support     PT Discharge Recommendation: Post acute rehabilitation services    See flowsheet documentation for full assessment.

## 2023-07-07 NOTE — PLAN OF CARE
Problem: Potential for Falls  Goal: Patient will remain free of falls  Description: INTERVENTIONS:  - Educate patient/family on patient safety including physical limitations  - Instruct patient to call for assistance with activity   - Consult OT/PT to assist with strengthening/mobility   - Keep Call bell within reach  - Keep bed low and locked with side rails adjusted as appropriate  - Keep care items and personal belongings within reach  - Initiate and maintain comfort rounds  - Make Fall Risk Sign visible to staff  - Apply yellow socks and bracelet for high fall risk patients  - Consider moving patient to room near nurses station  Outcome: Progressing     Problem: MOBILITY - ADULT  Goal: Maintain or return to baseline ADL function  Description: INTERVENTIONS:  -  Assess patient's ability to carry out ADLs; assess patient's baseline for ADL function and identify physical deficits which impact ability to perform ADLs (bathing, care of mouth/teeth, toileting, grooming, dressing, etc.)  - Assess/evaluate cause of self-care deficits   - Assess range of motion  - Assess patient's mobility; develop plan if impaired  - Assess patient's need for assistive devices and provide as appropriate  - Encourage maximum independence but intervene and supervise when necessary  - Involve family in performance of ADLs  - Assess for home care needs following discharge   - Consider OT consult to assist with ADL evaluation and planning for discharge  - Provide patient education as appropriate  Outcome: Progressing  Goal: Maintains/Returns to pre admission functional level  Description: INTERVENTIONS:  - Perform BMAT or MOVE assessment daily.   - Set and communicate daily mobility goal to care team and patient/family/caregiver.    - Collaborate with rehabilitation services on mobility goals if consulted  - Record patient progress and toleration of activity level   Outcome: Progressing     Problem: Prexisting or High Potential for Compromised Skin Integrity  Goal: Skin integrity is maintained or improved  Description: INTERVENTIONS:  - Identify patients at risk for skin breakdown  - Assess and monitor skin integrity  - Assess and monitor nutrition and hydration status  - Monitor labs   - Assess for incontinence   - Turn and reposition patient  - Assist with mobility/ambulation  - Relieve pressure over bony prominences  - Avoid friction and shearing  - Provide appropriate hygiene as needed including keeping skin clean and dry  - Evaluate need for skin moisturizer/barrier cream  - Collaborate with interdisciplinary team   - Patient/family teaching  - Consider wound care consult   Outcome: Progressing     Problem: Nutrition/Hydration-ADULT  Goal: Nutrient/Hydration intake appropriate for improving, restoring or maintaining nutritional needs  Description: Monitor and assess patient's nutrition/hydration status for malnutrition. Collaborate with interdisciplinary team and initiate plan and interventions as ordered. Monitor patient's weight and dietary intake as ordered or per policy. Utilize nutrition screening tool and intervene as necessary. Determine patient's food preferences and provide high-protein, high-caloric foods as appropriate.      INTERVENTIONS:  - Monitor oral intake, urinary output, labs, and treatment plans  - Assess nutrition and hydration status and recommend course of action  - Evaluate amount of meals eaten  - Assist patient with eating if necessary   - Allow adequate time for meals  - Recommend/ encourage appropriate diets, oral nutritional supplements, and vitamin/mineral supplements  - Order, calculate, and assess calorie counts as needed  - Recommend, monitor, and adjust tube feedings and TPN/PPN based on assessed needs  - Assess need for intravenous fluids  - Provide specific nutrition/hydration education as appropriate  - Include patient/family/caregiver in decisions related to nutrition  Outcome: Progressing Problem: PAIN - ADULT  Goal: Verbalizes/displays adequate comfort level or baseline comfort level  Description: Interventions:  - Encourage patient to monitor pain and request assistance  - Assess pain using appropriate pain scale  - Administer analgesics based on type and severity of pain and evaluate response  - Implement non-pharmacological measures as appropriate and evaluate response  - Consider cultural and social influences on pain and pain management  - Notify physician/advanced practitioner if interventions unsuccessful or patient reports new pain  Outcome: Progressing     Problem: INFECTION - ADULT  Goal: Absence or prevention of progression during hospitalization  Description: INTERVENTIONS:  - Assess and monitor for signs and symptoms of infection  - Monitor lab/diagnostic results  - Monitor all insertion sites, i.e. indwelling lines, tubes, and drains  - Monitor endotracheal if appropriate and nasal secretions for changes in amount and color  - [place] appropriate cooling/warming therapies per order  - Administer medications as ordered  - Instruct and encourage patient and family to use good hand hygiene technique  - Identify and instruct in appropriate isolation precautions for identified infection/condition  Outcome: Progressing  Goal: Absence of fever/infection during neutropenic period  Description: INTERVENTIONS:  - Monitor WBC    Outcome: Progressing     Problem: DISCHARGE PLANNING  Goal: Discharge to home or other facility with appropriate resources  Description: INTERVENTIONS:  - Identify barriers to discharge w/patient and caregiver  - Arrange for needed discharge resources and transportation as appropriate  - Identify discharge learning needs (meds, wound care, etc.)  - Arrange for interpretive services to assist at discharge as needed  - Refer to Case Management Department for coordinating discharge planning if the patient needs post-hospital services based on physician/advanced practitioner order or complex needs related to functional status, cognitive ability, or social support system  Outcome: Progressing     Problem: Knowledge Deficit  Goal: Patient/family/caregiver demonstrates understanding of disease process, treatment plan, medications, and discharge instructions  Description: Complete learning assessment and assess knowledge base.   Interventions:  - Provide teaching at level of understanding  - Provide teaching via preferred learning methods  Outcome: Progressing

## 2023-07-07 NOTE — CASE MANAGEMENT
Case Management Discharge Planning Note    Patient name Tod Aid  Location [facility] 9[facility]-01 MRN [id number]  : 1945 Date 2023       Current Admission Date: 2023  Current Admission Diagnosis:Generalized weakness   Patient Active Problem List    Diagnosis Date Noted   • L4 vertebral fracture ([address]) 2023   • Moderate protein-calorie malnutrition ([address]) 2023   • Bacteremia due to Escherichia coli 2023   • Pyelonephritis 2023   • Hypokalemia 2023   • Hypomagnesemia 2023   • Generalized weakness 2023   • Acute cystitis with hematuria 2023   • Urinary retention 2023   • Immunodeficiency due to drugs ([address]) 2023   • Chronic venous hypertension (idiopathic) with ulcer of left lower extremity (CODE) ([address]) 2023   • Diabetic ulcer of left midfoot associated with type 2 diabetes mellitus, with bone involvement without evidence of necrosis ([address]) 2023   • Other diseases of thymus ([address]) 2023   • Protein calorie malnutrition ([address]) 2023   • Weight loss 2023   • Acute encephalopathy 2023   • Polypharmacy 2023   • Hypercalcemia 2023   • Compression fracture of L5 vertebra with routine healing 2023   • Acute low back pain 2023   • Effusion of right shoulder joint 2023   • Arm bruise, right, initial encounter 2023   • Concern for cerebral contusion 2023   • Dysphagia 2023   • 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   • Non-healing open wound of heel, initial encounter 10/12/2022   • Anemia 2022   • Pressure injury of left foot, stage 4 ([address])    • Hyponatremia 2022   • Venous insufficiency 2021   • Encounter for annual wellness exam in Medicare patient 2021   • S/P reverse total shoulder arthroplasty, left 2021   • Rupture long head biceps tendon, left, initial encounter 2021   • Shoulder dislocation 04/19/2021   • Depression 09/09/2020   • Other forms of systemic lupus erythematosus ([address]) 08/31/2020   • Vitamin D deficiency 07/24/2019   • Chronic pain syndrome 04/18/2019   • Rheumatoid arthritis involving multiple sites with positive rheumatoid factor ([address]) 03/04/2019   • Ambulatory dysfunction 11/13/2018   • Closed compression fracture of L3 lumbar vertebra 11/13/2018   • S/P total hip arthroplasty 11/27/2017   • Anxiety 08/30/2017   • Urinary tract infection without hematuria 08/30/2017   • RLS (restless legs syndrome) 08/30/2017   • RBBB 08/23/2017   • Age-related osteoporosis with current pathological fracture with routine healing 04/18/2017   • Hypothyroidism due to Hashimoto's thyroiditis 12/13/2016      LOS (days): 12  Geometric Mean LOS (GMLOS) (days): 2.80  Days to GMLOS:-9     OBJECTIVE:  Risk of Unplanned Readmission Score: 39.86         Current admission status: Inpatient   Preferred Pharmacy:   [address] [place] [address], [address] [place] [address]  Phone: [phone] Fax: [address], [address] [place] [address]  [address] [place] [address]  [address] [place] [address] Road [address]  Phone: [phone] Fax: [phone]    Primary Care Provider: Andrew Mendosa MD    Primary Insurance: MEDICARE  Secondary Insurance:     DISCHARGE DETAILS:    IMM Given (Date):: 07/07/23 (2:46pm)  IMM Given to[de-identified] Family  Family notified[de-identified] Ulysses Man, pt's niece via phone [phone]       Additional Comments: Per provider, pt to be seen by ENT and is not yet medically clear for discharge today, likely tomorrow. [facility] notified of this via [facility] and reported that they would have TF supplies tomorrow for pt. CM reviewed IMM over the phone with pt's niece Fifi Gonzalez. IMM placed in medical records bin.

## 2023-07-07 NOTE — ASSESSMENT & PLAN NOTE
Malnutrition Findings:   Adult Malnutrition type:  (suspect acute and chronic components)  Adult Degree of Malnutrition: Malnutrition of moderate degree  Malnutrition Characteristics: Fat loss, Muscle loss, Inadequate energy                  360 Statement: Moderate malnutrition r/t suspect acute and chronic medical conditions contributing as evidenced by PO intake <50% of estimated needs for 5 days, mild muscle loss around clavicles, deltoids, temples; mild buccal fat pad loss. Treatment to be determined pending further workup, family discussion, etc.    BMI Findings: Body mass index is 18.31 kg/m².

## 2023-07-07 NOTE — ASSESSMENT & PLAN NOTE
· Speech eval appreciated-patient evaluated again by speech today for pleasure feed consideration, patient with ongoing aspiration and high risk, will continue on strict n.p.o. status while inpatient  · VBS shows no swallowing function  · Status post PEG tube placement -tube feeds currently at goal patient is tolerating  · Swallow re evaluated>> recommend ENt consult to see if patient is a candidate for myotomy.  ENT consulted,

## 2023-07-07 NOTE — CONSULTS
OTOLARYNGOLOGY CONSULT    Date of Service: 7/7/2023      ASSESSMENT/PLAN:  Berta Kunz is a 77 y.o. female who we are consulted on for prominent cricopharyngeal bar. - patient with significant dysphagia and prominent cricopharyngeal bar noted on barium swallow  - possible candidate for cricopharyngeal myotomy   - s/p PEG placement for nutrition, continue tube feeds  - follow up outpatient with Dr. Garret Muhammad for further evaluation/ possible scheduling of cricopharyngeal bar  - no inpatient ENT intervention planned  - care otherwise per primary team     Please contact ENT Resident [facility] Text Role for any questions or concerns. HPI  73yoF who presented to [facility] on 6/25 with generalized weakness, concern for pyelonephritis, n/v. We are consulted for cricopharyngeal bar. She has had progressive dysphagia and she reports that she has not been able to eat much of anything for the last 2 weeks. She has had dysphagia for some time, and had a video barium swallow performed in February of this year which revealed a prominent cricopharyngeal bar. Per chart review it seems that she was initially not interested in ENT evaluation or surgical intervention. She has had progression of her dysphagia, and underwent PEG tube placement on 6/28/2023 with GI. The EGD was otherwise unremarkable with a normal Z-line observed. She did have a nonobstructing Schatzki's ring, and they had difficulty passing the cricopharyngeus. They did do a 12 mm dilation of the cricopharyngeus using a balloon. Today, upon our evaluation she reports that she was able to tolerate Jell-O and pudding today without difficulty. She feels she is generally able to tolerate liquids as well. She denies any history of reflux, food sensitivity including dairy or gluten.      CURRENT HOSPITAL MEDICATIONS  Current Facility-Administered Medications   Medication Dose Route Frequency Provider Last Rate Last Admin   • acetaminophen (TYLENOL) tablet 975 mg  975 mg Oral Q8H [address] Kolton Josue MD   975 mg at 07/07/23 1305   • bisacodyl (DULCOLAX) rectal suppository 10 mg  10 mg Rectal Daily PRN Kolton Josue MD       • Diclofenac Sodium (VOLTAREN) 1 % topical gel 2 g  2 g Topical 4x Daily Maggie Mojica PA-C   2 g at 76/92/07 8088   • folic acid (FOLVITE) tablet 2 mg  2 mg Per PEG Tube Daily Bassem Barreraai, DO   2 mg at 07/07/23 1002   • gabapentin (NEURONTIN) capsule 200 mg  200 mg Per PEG Tube TID Bassem Banai, DO   200 mg at 07/07/23 1002   • heparin (porcine) subcutaneous injection 5,000 Units  5,000 Units Subcutaneous [facility], DO   5,000 Units at 07/07/23 1305   • HYDROmorphone HCl (DILAUDID) injection 0.2 mg  0.2 mg Intravenous Q4H PRN Kolton Josue MD   0.2 mg at 07/06/23 1038   • hydroxychloroquine (PLAQUENIL) tablet 200 mg  200 mg Per PEG Tube Daily With Breakfast Bassem Banai, DO   200 mg at 07/07/23 1002    And   • hydroxychloroquine (PLAQUENIL) tablet 100 mg  100 mg Per PEG Tube Daily With [employer], DO   100 mg at 07/06/23 1616   • levothyroxine tablet 75 mcg  75 mcg Per PEG Tube Early Morning Bassem Banai, DO   75 mcg at 07/07/23 0620   • lidocaine (LIDODERM) 5 % patch 1 patch  1 patch Topical Daily Kolton Josue MD   1 patch at 07/06/23 0855   • melatonin tablet 3 mg  3 mg Per PEG Tube HS Bassem Banai, DO   3 mg at 07/06/23 2157   • methocarbamol (ROBAXIN) tablet 500 mg  500 mg Per PEG Tube Daily PRN Bassem Barreraai, DO   500 mg at 07/05/23 0737   • nortriptyline (PAMELOR) capsule 100 mg  100 mg Per PEG Tube HS Bassem Banai, DO   100 mg at 07/06/23 2200   • ondansetron (ZOFRAN) injection 4 mg  4 mg Intravenous Q6H PRN Odilon Russell DO       • oxyCODONE (ROXICODONE) split tablet 2.5 mg  2.5 mg Per PEG Tube Q4H PRN Corona Lord PA-C   2.5 mg at 07/07/23 1350   • pantoprazole (PROTONIX) injection 40 mg  40 mg Intravenous Q24H [address] Hillary Roldan PA-C   40 mg at 07/07/23 1003   • polyethylene glycol (MIRALAX) packet 17 g  17 g Per PEG Tube Daily Elzbieta Mays MD   17 g at 07/07/23 1003   • rOPINIRole (REQUIP) tablet 2 mg  2 mg Per G Tube HS Bassem Santillan DO   2 mg at 07/06/23 2157   • senna-docusate sodium (SENOKOT S) 8.6-50 mg per tablet 1 tablet  1 tablet Oral BID Elzbieta Mays MD   1 tablet at 07/07/23 1002       REVIEW OF SYSTEMS  As above    HISTORIES  PMH:  Past Medical History:   Diagnosis Date   • Acute blood loss anemia 9/9/2020   • Aftercare following joint replacement 9/9/2020    Patient had right reversed total shoulder arthroplasty.  Pain was controlled when he left the hospital.     • Anxiety    • Arthritis    • Cellulitis of left lower extremity 11/30/2019   • Depression    • Disease of thyroid gland     HYPO   • Dyspepsia 11/12/2019   • Dysphagia 2/6/2023   • Femur neck fracture (HCC)    • GERD (gastroesophageal reflux disease)    • Hyperthyroidism     RESOLVED: [id number]   • Hyponatremia 7/25/2022   • Leg pain 2/6/2023   • Osteoporosis    • Polio    • PVD (peripheral vascular disease) ([address])    • Right BBB/left ant fasc block    • Shoulder pain, bilateral 7/27/2021   • UTI (urinary tract infection)    • Varicella infection     LAST ASSESSED: [id number]       PSH:  Past Surgical History:   Procedure Laterality Date   • APPENDECTOMY     • HIP ARTHROPLASTY Left 11/27/2017    Procedure: REMOVAL IM NAIL CONVERSION TO TOTAL HIP ARTHROPLASTY POSTERIOR APPROACH;  Surgeon: Thomas Durham MD;  [facility];  Service: Orthopedics   • HIP FRACTURE SURGERY     • HIP SURGERY      both hips replaced;2013 left ,2014 right   • JOINT REPLACEMENT Right     HIP TOTAL   • LA ARTHROPLASTY GLENOHUMERAL JOINT TOTAL SHOULDER Right 9/2/2020    Procedure: ARTHROPLASTY SHOULDER REVERSE;  Surgeon: Marie Hernández MD;  [facility];  Service: Orthopedics   • LA ARTHROPLASTY GLENOHUMERAL JOINT TOTAL SHOULDER Left 6/1/2021    Procedure: ARTHROPLASTY SHOULDER REVERSE;  Surgeon: Marie Hernández MD;  [facility];  Service: Orthopedics   • LA CONV PREV HIP TOT HIP ARTHRP W/WO AGRFT/ALGRFT Left 10/4/2017    Procedure: Roosvelt Noemi removal of left hip;  Surgeon: Mikayla Cabrera MD;  [facility];  Service: Orthopedics   • [place] [address] WRST SURG W/RLS TRANSVRS CARPL LIGM Right 5/24/2022    Procedure: RELEASE CARPAL TUNNEL ENDOSCOPIC-right;  Surgeon: Stephenie Roca MD;  [facility];  Service: Orthopedics   • REVISION TOTAL HIP ARTHROPLASTY Right    • TOE AMPUTATION Left 7/24/2022    Procedure: 5TH METATARSAL RESECTION WITH BOTTOM FLAP CLOSURE;  Surgeon: Elena Byers DPM;  [facility];  Service: Podiatry   • TONSILLECTOMY         SocHx:  Social History     Tobacco Use   • Smoking status: Former   • Smokeless tobacco: Never   • Tobacco comments:     quit 20-30 years ago   Vaping Use   • Vaping Use: Never used   Substance Use Topics   • Alcohol use: Not Currently   • Drug use: Not Currently       FH:  Family History   Problem Relation Age of Onset   • Rheum arthritis Mother    • Rheum arthritis Sister    • Prostate cancer Brother        ALLERGIES:  No Known Allergies    PHYSICAL EXAM  Visit Vitals  /57   Pulse 73   Temp 98 °F (36.7 °C)   Resp 18   Ht 4' 9.01" (1.448 m)   Wt 38.4 kg (84 lb 10.5 oz)   SpO2 98%   BMI 18.31 kg/m²   OB Status Postmenopausal   Smoking Status Former   BSA 1.25 m²       General: NAD, AOx4, thin   Eyes:  EOMI, PERRL  Ears:  External ears normal in appearance   Nose:  External appearance normal, no septal deviation   Oral cavity:  No trismus, no mass/lesions  Neck: Trachea is midline; no thyroid nodules, Salivary glands symmetrical, no masses/abnormality on palpation  Lymph:  No cervical lymphadenopathy  Skin:  No obvious facial lesions  Neuro: Motor and sensory grossly intact. Face symmetrical, no obvious cranial nerve palsies,motor and sensory grossly intact, no focal deficits.    Lungs:  Normal work of breathing, symmetrical chest expansion  Vascular: Well perfused      LABORATORY  Reviewed    PROCEDURES  none    RADIOLOGY  VBS 2/2023 reviewed prominent cricopharyngeal bar images as below  6/28 images not available due to size and inability of system to load          Patient Active Problem List    Diagnosis Date Noted   • L4 vertebral fracture ([address]) 07/05/2023   • Moderate protein-calorie malnutrition ([address]) 06/28/2023   • Bacteremia due to Escherichia coli 06/27/2023   • Pyelonephritis 06/25/2023   • Hypokalemia 06/25/2023   • Hypomagnesemia 06/02/2023   • Generalized weakness 06/01/2023   • Acute cystitis with hematuria 06/01/2023   • Urinary retention 06/01/2023   • Immunodeficiency due to drugs ([address]) 05/17/2023   • Chronic venous hypertension (idiopathic) with ulcer of left lower extremity (CODE) ([address]) 05/17/2023   • Diabetic ulcer of left midfoot associated with type 2 diabetes mellitus, with bone involvement without evidence of necrosis ([address]) 05/17/2023   • Other diseases of thymus ([address]) 05/17/2023   • Protein calorie malnutrition ([address]) 05/04/2023   • Weight loss 04/25/2023   • Acute encephalopathy 04/19/2023   • Polypharmacy 04/19/2023   • Hypercalcemia 04/19/2023   • Compression fracture of L5 vertebra with routine healing 04/17/2023   • Acute low back pain 04/13/2023   • Effusion of right shoulder joint 04/06/2023   • Arm bruise, right, initial encounter 03/14/2023   • Concern for cerebral contusion 02/19/2023   • Dysphagia 02/06/2023   • 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   • Non-healing open wound of heel, initial encounter 10/12/2022   • Anemia 08/03/2022   • Pressure injury of left foot, stage 4 ([address])    • Hyponatremia 07/25/2022   • Venous insufficiency 11/17/2021   • Encounter for annual wellness exam in Medicare patient 06/14/2021   • S/P reverse total shoulder arthroplasty, left 06/03/2021   • Rupture long head biceps tendon, left, initial encounter 05/13/2021   • Shoulder dislocation 04/19/2021   • Depression 09/09/2020   • Other forms of systemic lupus erythematosus ([address]) 08/31/2020   • Vitamin D deficiency 07/24/2019   • Chronic pain syndrome 04/18/2019   • Rheumatoid arthritis involving multiple sites with positive rheumatoid factor ([address]) 03/04/2019   • Ambulatory dysfunction 11/13/2018   • Closed compression fracture of L3 lumbar vertebra 11/13/2018   • S/P total hip arthroplasty 11/27/2017   • Anxiety 08/30/2017   • Urinary tract infection without hematuria 08/30/2017   • RLS (restless legs syndrome) 08/30/2017   • RBBB 08/23/2017   • Age-related osteoporosis with current pathological fracture with routine healing 04/18/2017   • Hypothyroidism due to Hashimoto's thyroiditis 12/13/2016       Sade Multani MD  Otolaryngology - Head and Neck Surgery PGY-3  Please contact ENT Resident [place] Text Role for any questions or concerns.

## 2023-07-07 NOTE — CASE MANAGEMENT
Case Management Discharge Planning Note    Patient name Suri Morgan  Location [facility] 926/[facility] 926-01 MRN [id number]  : 1945 Date 2023       Current Admission Date: 2023  Current Admission Diagnosis:Generalized weakness   Patient Active Problem List    Diagnosis Date Noted   • L4 vertebral fracture ([address]) 2023   • Moderate protein-calorie malnutrition ([address]) 2023   • Bacteremia due to Escherichia coli 2023   • Pyelonephritis 2023   • Hypokalemia 2023   • Hypomagnesemia 2023   • Generalized weakness 2023   • Acute cystitis with hematuria 2023   • Urinary retention 2023   • Immunodeficiency due to drugs ([address]) 2023   • Chronic venous hypertension (idiopathic) with ulcer of left lower extremity (CODE) ([address]) 2023   • Diabetic ulcer of left midfoot associated with type 2 diabetes mellitus, with bone involvement without evidence of necrosis ([address]) 2023   • Other diseases of thymus ([address]) 2023   • Protein calorie malnutrition ([address]) 2023   • Weight loss 2023   • Acute encephalopathy 2023   • Polypharmacy 2023   • Hypercalcemia 2023   • Compression fracture of L5 vertebra with routine healing 2023   • Acute low back pain 2023   • Effusion of right shoulder joint 2023   • Arm bruise, right, initial encounter 2023   • Concern for cerebral contusion 2023   • Dysphagia 2023   • 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   • Non-healing open wound of heel, initial encounter 10/12/2022   • Anemia 2022   • Pressure injury of left foot, stage 4 ([address])    • Hyponatremia 2022   • Venous insufficiency 2021   • Encounter for annual wellness exam in Medicare patient 2021   • S/P reverse total shoulder arthroplasty, left 2021   • Rupture long head biceps tendon, left, initial encounter 2021   • Shoulder dislocation 04/19/2021   • Depression 09/09/2020   • Other forms of systemic lupus erythematosus ([address]) 08/31/2020   • Vitamin D deficiency 07/24/2019   • Chronic pain syndrome 04/18/2019   • Rheumatoid arthritis involving multiple sites with positive rheumatoid factor ([address]) 03/04/2019   • Ambulatory dysfunction 11/13/2018   • Closed compression fracture of L3 lumbar vertebra 11/13/2018   • S/P total hip arthroplasty 11/27/2017   • Anxiety 08/30/2017   • Urinary tract infection without hematuria 08/30/2017   • RLS (restless legs syndrome) 08/30/2017   • RBBB 08/23/2017   • Age-related osteoporosis with current pathological fracture with routine healing 04/18/2017   • Hypothyroidism due to Hashimoto's thyroiditis 12/13/2016      LOS (days): 12  Geometric Mean LOS (GMLOS) (days): 2.80  Days to GMLOS:-8.9     OBJECTIVE:  Risk of Unplanned Readmission Score: 39.86         Current admission status: Inpatient   Preferred Pharmacy:   [address] [place] [address]  Phone: [phone] Fax: [address] [place] [address], [address] [place] [address]  Phone: [phone] Fax: [phone]    Primary Care Provider: Willam Copeland MD    Primary Insurance: MEDICARE  Secondary Insurance:     DISCHARGE DETAILS:      Other Referral/Resources/Interventions Provided:  Interventions: Short Term Rehab  Referral Comments: [facility]         Treatment Team Recommendation: Short Term Rehab  Discharge Destination Plan[de-identified] Short Term Rehab            Additional Comments: CM met with pt and pt's niece to review patient choice list. Pt's niece reported that she and pt are agreeable to [facility] for rehab. CM reserved in [place] and notified facility of choice. CM also notified provider.  Facility reported that they have to check on tube feed supplies to ensure they have pt's formula. CM to follow.         Accepting Facility Name, [address] [place] [address] : [address] [place] [address] Acute  Receiving Facility/Agency Phone Number: [phone]  Facility/Agency Fax Number: [phone]

## 2023-07-08 VITALS
TEMPERATURE: 98.1 F | DIASTOLIC BLOOD PRESSURE: 65 MMHG | BODY MASS INDEX: 19.31 KG/M2 | HEART RATE: 74 BPM | RESPIRATION RATE: 16 BRPM | HEIGHT: 57 IN | WEIGHT: 89.51 LBS | OXYGEN SATURATION: 97 % | SYSTOLIC BLOOD PRESSURE: 111 MMHG

## 2023-07-08 PROCEDURE — C9113 INJ PANTOPRAZOLE SODIUM, VIA: HCPCS | Performed by: PHYSICIAN ASSISTANT

## 2023-07-08 PROCEDURE — 99239 HOSP IP/OBS DSCHRG MGMT >30: CPT | Performed by: INTERNAL MEDICINE

## 2023-07-08 RX ORDER — OXYCODONE HYDROCHLORIDE 5 MG/1
2.5 TABLET ORAL EVERY 6 HOURS PRN
Qty: 10 TABLET | Refills: 0 | Status: SHIPPED | OUTPATIENT
Start: 2023-07-08 | End: 2023-07-18

## 2023-07-08 RX ADMIN — HEPARIN SODIUM 5000 UNITS: 5000 INJECTION INTRAVENOUS; SUBCUTANEOUS at 07:25

## 2023-07-08 RX ADMIN — ACETAMINOPHEN 975 MG: 325 TABLET, FILM COATED ORAL at 13:47

## 2023-07-08 RX ADMIN — SENNOSIDES AND DOCUSATE SODIUM 1 TABLET: 50; 8.6 TABLET ORAL at 09:19

## 2023-07-08 RX ADMIN — DICLOFENAC SODIUM 2 G: 10 GEL TOPICAL at 13:45

## 2023-07-08 RX ADMIN — Medication 2.5 MG: at 09:19

## 2023-07-08 RX ADMIN — GABAPENTIN 200 MG: 100 CAPSULE ORAL at 09:19

## 2023-07-08 RX ADMIN — HEPARIN SODIUM 5000 UNITS: 5000 INJECTION INTRAVENOUS; SUBCUTANEOUS at 13:47

## 2023-07-08 RX ADMIN — GABAPENTIN 200 MG: 100 CAPSULE ORAL at 16:56

## 2023-07-08 RX ADMIN — FOLIC ACID 2 MG: 1 TABLET ORAL at 09:19

## 2023-07-08 RX ADMIN — METHOCARBAMOL TABLETS 500 MG: 500 TABLET, COATED ORAL at 09:20

## 2023-07-08 RX ADMIN — PANTOPRAZOLE SODIUM 40 MG: 40 INJECTION, POWDER, FOR SOLUTION INTRAVENOUS at 09:20

## 2023-07-08 RX ADMIN — ACETAMINOPHEN 975 MG: 325 TABLET, FILM COATED ORAL at 07:24

## 2023-07-08 RX ADMIN — Medication 2.5 MG: at 13:47

## 2023-07-08 RX ADMIN — DICLOFENAC SODIUM 2 G: 10 GEL TOPICAL at 09:20

## 2023-07-08 RX ADMIN — LEVOTHYROXINE SODIUM 75 MCG: 75 TABLET ORAL at 07:24

## 2023-07-08 RX ADMIN — HYDROXYCHLOROQUINE SULFATE 200 MG: 200 TABLET ORAL at 07:25

## 2023-07-08 RX ADMIN — LIDOCAINE 5% 1 PATCH: 700 PATCH TOPICAL at 09:20

## 2023-07-08 RX ADMIN — SENNOSIDES AND DOCUSATE SODIUM 1 TABLET: 50; 8.6 TABLET ORAL at 16:56

## 2023-07-08 RX ADMIN — POLYETHYLENE GLYCOL 3350 17 G: 17 POWDER, FOR SOLUTION ORAL at 09:19

## 2023-07-08 RX ADMIN — HYDROXYCHLOROQUINE SULFATE 100 MG: 200 TABLET ORAL at 16:56

## 2023-07-08 NOTE — PLAN OF CARE
Problem: Potential for Falls  Goal: Patient will remain free of falls  Description: INTERVENTIONS:  - Educate patient/family on patient safety including physical limitations  - Instruct patient to call for assistance with activity   - Consult OT/PT to assist with strengthening/mobility   - Keep Call bell within reach  - Keep bed low and locked with side rails adjusted as appropriate  - Keep care items and personal belongings within reach  - Initiate and maintain comfort rounds  - Make Fall Risk Sign visible to staff  - Offer Toileting every 2 Hours, in advance of need  - Initiate/Maintain bed alarm  - Obtain necessary fall risk management equipment:   - Apply yellow socks and bracelet for high fall risk patients  - Consider moving patient to room near nurses station  Outcome: Progressing     Problem: MOBILITY - ADULT  Goal: Maintain or return to baseline ADL function  Description: INTERVENTIONS:  -  Assess patient's ability to carry out ADLs; assess patient's baseline for ADL function and identify physical deficits which impact ability to perform ADLs (bathing, care of mouth/teeth, toileting, grooming, dressing, etc.)  - Assess/evaluate cause of self-care deficits   - Assess range of motion  - Assess patient's mobility; develop plan if impaired  - Assess patient's need for assistive devices and provide as appropriate  - Encourage maximum independence but intervene and supervise when necessary  - Involve family in performance of ADLs  - Assess for home care needs following discharge   - Consider OT consult to assist with ADL evaluation and planning for discharge  - Provide patient education as appropriate  Outcome: Progressing     Problem: Prexisting or High Potential for Compromised Skin Integrity  Goal: Skin integrity is maintained or improved  Description: INTERVENTIONS:  - Identify patients at risk for skin breakdown  - Assess and monitor skin integrity  - Assess and monitor nutrition and hydration status  - Monitor labs   - Assess for incontinence   - Turn and reposition patient  - Assist with mobility/ambulation  - Relieve pressure over bony prominences  - Avoid friction and shearing  - Provide appropriate hygiene as needed including keeping skin clean and dry  - Evaluate need for skin moisturizer/barrier cream  - Collaborate with interdisciplinary team   - Patient/family teaching  - Consider wound care consult   Outcome: Progressing     Problem: Nutrition/Hydration-ADULT  Goal: Nutrient/Hydration intake appropriate for improving, restoring or maintaining nutritional needs  Description: Monitor and assess patient's nutrition/hydration status for malnutrition. Collaborate with interdisciplinary team and initiate plan and interventions as ordered. Monitor patient's weight and dietary intake as ordered or per policy. Utilize nutrition screening tool and intervene as necessary. Determine patient's food preferences and provide high-protein, high-caloric foods as appropriate.      INTERVENTIONS:  - Monitor oral intake, urinary output, labs, and treatment plans  - Assess nutrition and hydration status and recommend course of action  - Evaluate amount of meals eaten  - Assist patient with eating if necessary   - Allow adequate time for meals  - Recommend/ encourage appropriate diets, oral nutritional supplements, and vitamin/mineral supplements  - Order, calculate, and assess calorie counts as needed  - Recommend, monitor, and adjust tube feedings and TPN/PPN based on assessed needs  - Assess need for intravenous fluids  - Provide specific nutrition/hydration education as appropriate  - Include patient/family/caregiver in decisions related to nutrition  Outcome: Progressing     Problem: PAIN - ADULT  Goal: Verbalizes/displays adequate comfort level or baseline comfort level  Description: Interventions:  - Encourage patient to monitor pain and request assistance  - Assess pain using appropriate pain scale  - Administer analgesics based on type and severity of pain and evaluate response  - Implement non-pharmacological measures as appropriate and evaluate response  - Consider cultural and social influences on pain and pain management  - Notify physician/advanced practitioner if interventions unsuccessful or patient reports new pain  Outcome: Progressing

## 2023-07-08 NOTE — CASE MANAGEMENT
Case Management Discharge Planning Note    Patient name Faviola Dey  Location [facility] 926/[facility] 926- MRN [id number]  : 1945 Date 2023       Current Admission Date: 2023  Current Admission Diagnosis:Generalized weakness   Patient Active Problem List    Diagnosis Date Noted   • L4 vertebral fracture ([address]) 2023   • Moderate protein-calorie malnutrition ([address]) 2023   • Bacteremia due to Escherichia coli 2023   • Pyelonephritis 2023   • Hypokalemia 2023   • Hypomagnesemia 2023   • Generalized weakness 2023   • Acute cystitis with hematuria 2023   • Urinary retention 2023   • Immunodeficiency due to drugs ([address]) 2023   • Chronic venous hypertension (idiopathic) with ulcer of left lower extremity (CODE) ([address]) 2023   • Diabetic ulcer of left midfoot associated with type 2 diabetes mellitus, with bone involvement without evidence of necrosis ([address]) 2023   • Other diseases of thymus ([address]) 2023   • Protein calorie malnutrition ([address]) 2023   • Weight loss 2023   • Acute encephalopathy 2023   • Polypharmacy 2023   • Hypercalcemia 2023   • Compression fracture of L5 vertebra with routine healing 2023   • Acute low back pain 2023   • Effusion of right shoulder joint 2023   • Arm bruise, right, initial encounter 2023   • Concern for cerebral contusion 2023   • Dysphagia 2023   • 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   • Non-healing open wound of heel, initial encounter 10/12/2022   • Anemia 2022   • Pressure injury of left foot, stage 4 ([address])    • Hyponatremia 2022   • Venous insufficiency 2021   • Encounter for annual wellness exam in Medicare patient 2021   • S/P reverse total shoulder arthroplasty, left 2021   • Rupture long head biceps tendon, left, initial encounter 2021   • Shoulder dislocation 04/19/2021   • Depression 09/09/2020   • Other forms of systemic lupus erythematosus ([address]) 08/31/2020   • Vitamin D deficiency 07/24/2019   • Chronic pain syndrome 04/18/2019   • Rheumatoid arthritis involving multiple sites with positive rheumatoid factor ([address]) 03/04/2019   • Ambulatory dysfunction 11/13/2018   • Closed compression fracture of L3 lumbar vertebra 11/13/2018   • S/P total hip arthroplasty 11/27/2017   • Anxiety 08/30/2017   • Urinary tract infection without hematuria 08/30/2017   • RLS (restless legs syndrome) 08/30/2017   • RBBB 08/23/2017   • Age-related osteoporosis with current pathological fracture with routine healing 04/18/2017   • Hypothyroidism due to Hashimoto's thyroiditis 12/13/2016      LOS (days): 13  Geometric Mean LOS (GMLOS) (days): 2.80  Days to GMLOS:-10     OBJECTIVE:  Risk of Unplanned Readmission Score: 40.35         Current admission status: Inpatient   Preferred Pharmacy:   [address] [place] [address], [address] [place] [address]  Phone: [phone] Fax: [address], [address] [place] [address]  Phone: [phone] Fax: [phone]    Primary Care Provider: Willam Copeland MD    Primary Insurance: MEDICARE  Secondary Insurance:     DISCHARGE DETAILS:          Additional Comments: RICARDO CATALAN made aware that pt is cleared for d/c. Pt was accepted by [facility]. RICARDO CATALAN requested BLS transport and was given 5 pm . RICARDO CATALAN updated provider, nurse and accepting facility.

## 2023-07-08 NOTE — DISCHARGE SUMMARY
[address] [facility]  Discharge- Geetha Colon 1945, 77 y.o. female MRN: [id number]  [facility]01 Encounter: [id number]  Primary Care Provider: Dao Hartman MD   Date and time admitted to hospital: 6/25/2023  3:53 PM    * Generalized weakness  Assessment & Plan  · Presenting with increased weakness over the past several days with generalized malaise, 3 days nausea/vomiting as well as intermittent diarrhea. Of note was recently hospitalized here also with generalized weakness and acute cystitis, appears rehab was recommended by PT/OT however patient refused this and went home with home therapies. · Possibly multifactorial in the setting of electrolyte abnormalities, pyelonephritis, bacteremia, dysphagia  · PT/OT  -  Rehab on discharge, CM following  · Status post EGD and PEG tube placement with GI, nutrition consult appreciated    Hyponatremia  Assessment & Plan  · Sodium 131 on admission, possibly due to poor recent oral intake and nausea/vomiting  · Now 134>> 132>> increased free water flushes. >> improved to 136. · Trend BMP    Bacteremia due to Escherichia coli  Assessment & Plan  · 1 out of 2 blood cultures positive for E. Coli  · Suspect in setting of acute pyelonephritis  · Completed abx course    Pyelonephritis  Assessment & Plan  · Was treated for acute cystitis during recent admission, CT ab/pelvis now with findings suspicious for right pyelonephritis. Patient without complaints of right side abdominal/flank pain and with benign examination. UA with WBCs and bacteria.   Elevated procalcitonin and WBC is noted  · completed abx course,  · Trend WC, temperature curve, hemodynamics    Protein calorie malnutrition (HCC)  Assessment & Plan  Malnutrition Findings:   Adult Malnutrition type:  (suspect acute and chronic components)  Adult Degree of Malnutrition: Malnutrition of moderate degree  Malnutrition Characteristics: Fat loss, Muscle loss, Inadequate energy                  360 Statement: Moderate malnutrition r/t suspect acute and chronic medical conditions contributing as evidenced by PO intake <50% of estimated needs for 5 days, mild muscle loss around clavicles, deltoids, temples; mild buccal fat pad loss. Treatment to be determined pending further workup, family discussion, etc.    BMI Findings: Body mass index is 19.36 kg/m². Compression fracture of L5 vertebra with routine healing  Assessment & Plan  · Again noted on CT imaging worsened from prior examination, additionally with marked compression deformity at L4. Patient denying any increase in back pain from baseline  · Now with acute on chronic pain  · Lidocaine patch , tylenol and robaxin  · Neurosurgery consulted>> Xrays shows stable fracture>> no surgical intervention. Not a candidate for now given recent bacteremia  · Pain control with tylenol. Lidocaine patch and prn robaxin. Dysphagia  Assessment & Plan  · Speech eval appreciated-patient evaluated again by speech today for pleasure feed consideration, patient with ongoing aspiration and high risk, will continue on strict n.p.o. status while inpatient  · VBS shows no swallowing function  · Status post PEG tube placement -tube feeds currently at goal patient is tolerating  · Swallow re evaluated>> recommend ENt consult to see if patient is a candidate for myotomy. ENT consulted>> they recommend Op follow up.   · Maintain NPO for now per swallow team.    Chronic pain syndrome  Assessment & Plan  · Patient reporting no increase in chronic pain at this time  · PDMP reviewed, we will continue with low-dose oxycodone as needed      Rheumatoid arthritis involving multiple sites with positive rheumatoid factor (HCC)  Assessment & Plan  · Home dose Plaquenil 200 mg in a.m. and 100 mg in PM, methotrexate 7.5 mg every Thursday  · Continue home medications        Discharge Summary - [facility]    Patient Information: Travis Rivera 77 y.o. female MRN: [id number]  Unit/Bed#: [facility] 926-01 Encounter: [id number]    Discharging Physician / Practitioner: Patricia Gomez MD  PCP: Trudi Barnett MD  Admission Date: 6/25/2023  Discharge Date: 07/08/23    Reason for Admission: Generalized weakness    Discharge Diagnoses:     Principal Problem:    Generalized weakness  Active Problems:    Hyponatremia    Rheumatoid arthritis involving multiple sites with positive rheumatoid factor (HCC)    Chronic pain syndrome    Dysphagia    Compression fracture of L5 vertebra with routine healing    Protein calorie malnutrition ([address])    Pyelonephritis    Hypokalemia    Bacteremia due to Escherichia coli    L4 vertebral fracture ([address])  Resolved Problems:    Nausea and vomiting    Abdominal pain      Consultations During Hospital Stay:    GI  Neurosurgery  Vascular surgery  ENT      Hospital Course:     Juanito Colindres is a 77 y.o. female patient who originally presented to the hospital on 6/25/2023 due to generalized weakness. Noted to have possible pyelonephritis and E. coli bacteremia, completed antibiotic course. Also noted to have significant dysphagia, evaluated by GI underwent EGD. Also underwent PEG tube placement and tube feeds started which she is tolerating well. Also evaluated by ENT for possibility of myotomy per follow team evaluation. ENT will follow outpatient. Given compression fracture, evaluated by neurosurgery, medical management with therapy for now. Outpatient follow-up. Rehab was recommended PT OT and  arranged for that. Please refer to detailed assessment and plan above for further details. Condition at Discharge: stable     Discharge Day Visit / Exam:     Subjective: No overnight events. No acute pain or discomfort. She is comfortable. Agreeable with discharge plan.     Vitals: Blood Pressure: 108/65 (07/08/23 0732)  Pulse: 74 (07/08/23 0732)  Temperature: 97.5 °F (36.4 °C) (07/08/23 0732)  Temp Source: Oral (07/06/23 0709)  Respirations: 17 (07/08/23 0732)  Height: 4' 9.01" (144.8 cm) (06/27/23 0600)  Weight - Scale: 40.6 kg (89 lb 8.1 oz) (07/08/23 0600)  SpO2: 99 % (07/08/23 0732)  Exam:   Physical Exam  Constitutional:       General: She is not in acute distress. Cardiovascular:      Rate and Rhythm: Normal rate and regular rhythm.      Heart sounds: Normal heart sounds. No murmur heard. Pulmonary:      Effort: No respiratory distress.      Breath sounds: Normal breath sounds. No wheezing or rales. Abdominal:      General: Bowel sounds are normal. There is no distension.      Palpations: Abdomen is soft.      Tenderness: no tednerness     Comments: PEG site noted   Neurological:      Mental Status: She is alert.      Comments: Awake alert and communicative  Moves all extremities     Discussion with Family: d/w With niece on phone. Discharge instructions/Information to patient and family:   See after visit summary for information provided to patient and family. Provisions for Follow-Up Care:  See after visit summary for information related to follow-up care and any pertinent home health orders. Disposition:     Acute Rehab at [facility] acute rehab       Discharge Statement:  I spent 45 minutes discharging the patient. This time was spent on the day of discharge. I had direct contact with the patient on the day of discharge. Greater than 50% of the total time was spent examining patient, answering all patient questions, arranging and discussing plan of care with patient as well as directly providing post-discharge instructions. Additional time then spent on discharge activities. Discharge Medications:  See after visit summary for reconciled discharge medications provided to patient and family.       ** Please Note: This note has been constructed using a voice recognition system **

## 2023-07-08 NOTE — ASSESSMENT & PLAN NOTE
Malnutrition Findings:   Adult Malnutrition type:  (suspect acute and chronic components)  Adult Degree of Malnutrition: Malnutrition of moderate degree  Malnutrition Characteristics: Fat loss, Muscle loss, Inadequate energy                  360 Statement: Moderate malnutrition r/t suspect acute and chronic medical conditions contributing as evidenced by PO intake <50% of estimated needs for 5 days, mild muscle loss around clavicles, deltoids, temples; mild buccal fat pad loss. Treatment to be determined pending further workup, family discussion, etc.    BMI Findings: Body mass index is 19.36 kg/m².

## 2023-07-08 NOTE — PLAN OF CARE
Problem: Potential for Falls  Goal: Patient will remain free of falls  Description: INTERVENTIONS:  - Educate patient/family on patient safety including physical limitations  - Instruct patient to call for assistance with activity   - Consult OT/PT to assist with strengthening/mobility   - Keep Call bell within reach  - Keep bed low and locked with side rails adjusted as appropriate  - Keep care items and personal belongings within reach  - Initiate and maintain comfort rounds  - Make Fall Risk Sign visible to staff  - Offer Toileting every 2 Hours, in advance of need  - Initiate/Maintain bed alarm  - Obtain necessary fall risk management equipment:   - Apply yellow socks and bracelet for high fall risk patients  - Consider moving patient to room near nurses station  Outcome: Adequate for Discharge     Problem: MOBILITY - ADULT  Goal: Maintain or return to baseline ADL function  Description: INTERVENTIONS:  -  Assess patient's ability to carry out ADLs; assess patient's baseline for ADL function and identify physical deficits which impact ability to perform ADLs (bathing, care of mouth/teeth, toileting, grooming, dressing, etc.)  - Assess/evaluate cause of self-care deficits   - Assess range of motion  - Assess patient's mobility; develop plan if impaired  - Assess patient's need for assistive devices and provide as appropriate  - Encourage maximum independence but intervene and supervise when necessary  - Involve family in performance of ADLs  - Assess for home care needs following discharge   - Consider OT consult to assist with ADL evaluation and planning for discharge  - Provide patient education as appropriate  Outcome: Adequate for Discharge  Goal: Maintains/Returns to pre admission functional level  Description: INTERVENTIONS:  - Perform BMAT or MOVE assessment daily.   - Set and communicate daily mobility goal to care team and patient/family/caregiver.    - Collaborate with rehabilitation services on mobility goals if consulted  - Record patient progress and toleration of activity level   Outcome: Adequate for Discharge     Problem: Prexisting or High Potential for Compromised Skin Integrity  Goal: Skin integrity is maintained or improved  Description: INTERVENTIONS:  - Identify patients at risk for skin breakdown  - Assess and monitor skin integrity  - Assess and monitor nutrition and hydration status  - Monitor labs   - Assess for incontinence   - Turn and reposition patient  - Assist with mobility/ambulation  - Relieve pressure over bony prominences  - Avoid friction and shearing  - Provide appropriate hygiene as needed including keeping skin clean and dry  - Evaluate need for skin moisturizer/barrier cream  - Collaborate with interdisciplinary team   - Patient/family teaching  - Consider wound care consult   Outcome: Adequate for Discharge     Problem: Nutrition/Hydration-ADULT  Goal: Nutrient/Hydration intake appropriate for improving, restoring or maintaining nutritional needs  Description: Monitor and assess patient's nutrition/hydration status for malnutrition. Collaborate with interdisciplinary team and initiate plan and interventions as ordered. Monitor patient's weight and dietary intake as ordered or per policy. Utilize nutrition screening tool and intervene as necessary. Determine patient's food preferences and provide high-protein, high-caloric foods as appropriate.      INTERVENTIONS:  - Monitor oral intake, urinary output, labs, and treatment plans  - Assess nutrition and hydration status and recommend course of action  - Evaluate amount of meals eaten  - Assist patient with eating if necessary   - Allow adequate time for meals  - Recommend/ encourage appropriate diets, oral nutritional supplements, and vitamin/mineral supplements  - Order, calculate, and assess calorie counts as needed  - Recommend, monitor, and adjust tube feedings and TPN/PPN based on assessed needs  - Assess need for intravenous fluids  - Provide specific nutrition/hydration education as appropriate  - Include patient/family/caregiver in decisions related to nutrition  Outcome: Adequate for Discharge     Problem: PAIN - ADULT  Goal: Verbalizes/displays adequate comfort level or baseline comfort level  Description: Interventions:  - Encourage patient to monitor pain and request assistance  - Assess pain using appropriate pain scale  - Administer analgesics based on type and severity of pain and evaluate response  - Implement non-pharmacological measures as appropriate and evaluate response  - Consider cultural and social influences on pain and pain management  - Notify physician/advanced practitioner if interventions unsuccessful or patient reports new pain  Outcome: Adequate for Discharge     Problem: INFECTION - ADULT  Goal: Absence or prevention of progression during hospitalization  Description: INTERVENTIONS:  - Assess and monitor for signs and symptoms of infection  - Monitor lab/diagnostic results  - Monitor all insertion sites, i.e. indwelling lines, tubes, and drains  - Monitor endotracheal if appropriate and nasal secretions for changes in amount and color  - [place] appropriate cooling/warming therapies per order  - Administer medications as ordered  - Instruct and encourage patient and family to use good hand hygiene technique  - Identify and instruct in appropriate isolation precautions for identified infection/condition  Outcome: Adequate for Discharge  Goal: Absence of fever/infection during neutropenic period  Description: INTERVENTIONS:  - Monitor WBC    Outcome: Adequate for Discharge     Problem: DISCHARGE PLANNING  Goal: Discharge to home or other facility with appropriate resources  Description: INTERVENTIONS:  - Identify barriers to discharge w/patient and caregiver  - Arrange for needed discharge resources and transportation as appropriate  - Identify discharge learning needs (meds, wound care, etc.)  - Arrange for interpretive services to assist at discharge as needed  - Refer to Case Management Department for coordinating discharge planning if the patient needs post-hospital services based on physician/advanced practitioner order or complex needs related to functional status, cognitive ability, or social support system  Outcome: Adequate for Discharge     Problem: Knowledge Deficit  Goal: Patient/family/caregiver demonstrates understanding of disease process, treatment plan, medications, and discharge instructions  Description: Complete learning assessment and assess knowledge base.   Interventions:  - Provide teaching at level of understanding  - Provide teaching via preferred learning methods  Outcome: Adequate for Discharge

## 2023-07-08 NOTE — INCIDENTAL FINDINGS
The following findings require follow up:  Radiographic finding   Finding: Heterogeneous soft tissue fullness at the left upper chest wall adjacent to the left anterior first and second ribs.   The overall fullness does appear similar to the prior noncontrast exam, question posttraumatic and related to old hematoma but given,  the heterogeneity recommend follow-up contrast chest CT in 3 months.,      Follow up required: chest CT   Follow up should be done within 3 month(s)    Please notify the following clinician to assist with the follow up:   Dr. Jazmin Guerin (PCP)

## 2023-07-08 NOTE — ASSESSMENT & PLAN NOTE
· Speech eval appreciated-patient evaluated again by speech today for pleasure feed consideration, patient with ongoing aspiration and high risk, will continue on strict n.p.o. status while inpatient  · VBS shows no swallowing function  · Status post PEG tube placement -tube feeds currently at goal patient is tolerating  · Swallow re evaluated>> recommend ENt consult to see if patient is a candidate for myotomy. ENT consulted>> they recommend Op follow up.   · Maintain NPO for now per swallow team.

## 2023-07-10 ENCOUNTER — TELEPHONE (OUTPATIENT)
Dept: OTHER | Facility: OTHER | Age: 78
End: 2023-07-10

## 2023-07-10 ENCOUNTER — NURSING HOME VISIT (OUTPATIENT)
Dept: GERIATRICS | Facility: OTHER | Age: 78
End: 2023-07-10
Payer: MEDICARE

## 2023-07-10 ENCOUNTER — PATIENT OUTREACH (OUTPATIENT)
Dept: CASE MANAGEMENT | Facility: OTHER | Age: 78
End: 2023-07-10

## 2023-07-10 ENCOUNTER — TRANSITIONAL CARE MANAGEMENT (OUTPATIENT)
Dept: INTERNAL MEDICINE CLINIC | Facility: CLINIC | Age: 78
End: 2023-07-10

## 2023-07-10 DIAGNOSIS — E03.8 HYPOTHYROIDISM DUE TO HASHIMOTO'S THYROIDITIS: ICD-10-CM

## 2023-07-10 DIAGNOSIS — N12 PYELONEPHRITIS: ICD-10-CM

## 2023-07-10 DIAGNOSIS — S32.050S CLOSED COMPRESSION FRACTURE OF L5 VERTEBRA, SEQUELA: ICD-10-CM

## 2023-07-10 DIAGNOSIS — M05.79 RHEUMATOID ARTHRITIS INVOLVING MULTIPLE SITES WITH POSITIVE RHEUMATOID FACTOR (HCC): Chronic | ICD-10-CM

## 2023-07-10 DIAGNOSIS — E06.3 HYPOTHYROIDISM DUE TO HASHIMOTO'S THYROIDITIS: ICD-10-CM

## 2023-07-10 DIAGNOSIS — R13.14 PHARYNGOESOPHAGEAL DYSPHAGIA: Primary | ICD-10-CM

## 2023-07-10 DIAGNOSIS — N39.0 URINARY TRACT INFECTION WITHOUT HEMATURIA, SITE UNSPECIFIED: ICD-10-CM

## 2023-07-10 PROBLEM — S32.050A CLOSED COMPRESSION FRACTURE OF FIFTH LUMBAR VERTEBRA (HCC): Status: ACTIVE | Noted: 2023-04-17

## 2023-07-10 PROCEDURE — 99306 1ST NF CARE HIGH MDM 50: CPT | Performed by: FAMILY MEDICINE

## 2023-07-10 NOTE — PROGRESS NOTES
Outpatient care management referral via HRR report  7/10/2023. Discharged to . Email sent to facility to inform them I will be following the patient during their skilled stay. This Admin Coordinator will continue to monitor via chart review.

## 2023-07-11 ENCOUNTER — TELEPHONE (OUTPATIENT)
Dept: OTHER | Facility: OTHER | Age: 78
End: 2023-07-11

## 2023-07-12 ENCOUNTER — TELEPHONE (OUTPATIENT)
Dept: OTHER | Facility: OTHER | Age: 78
End: 2023-07-12

## 2023-07-12 NOTE — TELEPHONE ENCOUNTER
Dakota Mendez called stating patient asked for feeding tube to be put on hold for a little as she was feeling full and having some nausea. On call notified via TC.

## 2023-07-13 ENCOUNTER — NURSING HOME VISIT (OUTPATIENT)
Dept: GERIATRICS | Facility: OTHER | Age: 78
End: 2023-07-13
Payer: MEDICARE

## 2023-07-13 VITALS
BODY MASS INDEX: 19.25 KG/M2 | WEIGHT: 89 LBS | DIASTOLIC BLOOD PRESSURE: 67 MMHG | TEMPERATURE: 97.4 F | SYSTOLIC BLOOD PRESSURE: 110 MMHG | RESPIRATION RATE: 16 BRPM | OXYGEN SATURATION: 96 % | HEART RATE: 73 BPM

## 2023-07-13 DIAGNOSIS — E06.3 HYPOTHYROIDISM DUE TO HASHIMOTO'S THYROIDITIS: ICD-10-CM

## 2023-07-13 DIAGNOSIS — R13.14 PHARYNGOESOPHAGEAL DYSPHAGIA: Primary | ICD-10-CM

## 2023-07-13 DIAGNOSIS — R53.1 GENERALIZED WEAKNESS: ICD-10-CM

## 2023-07-13 DIAGNOSIS — S32.050S CLOSED COMPRESSION FRACTURE OF L5 VERTEBRA, SEQUELA: ICD-10-CM

## 2023-07-13 DIAGNOSIS — F32.A DEPRESSION, UNSPECIFIED DEPRESSION TYPE: ICD-10-CM

## 2023-07-13 DIAGNOSIS — N12 PYELONEPHRITIS: ICD-10-CM

## 2023-07-13 DIAGNOSIS — R11.0 NAUSEA: ICD-10-CM

## 2023-07-13 DIAGNOSIS — G89.4 CHRONIC PAIN SYNDROME: ICD-10-CM

## 2023-07-13 DIAGNOSIS — E03.8 HYPOTHYROIDISM DUE TO HASHIMOTO'S THYROIDITIS: ICD-10-CM

## 2023-07-13 DIAGNOSIS — M05.79 RHEUMATOID ARTHRITIS INVOLVING MULTIPLE SITES WITH POSITIVE RHEUMATOID FACTOR (HCC): Chronic | ICD-10-CM

## 2023-07-13 PROCEDURE — 99309 SBSQ NF CARE MODERATE MDM 30: CPT

## 2023-07-13 NOTE — ASSESSMENT & PLAN NOTE
Patient presented to the hospital w/ generalized weakness following a prior hospitalization which she was recommended rehab and refused  Possibly multifactorial in setting of electrolyte abnormalities, pyelonephritis, bacteremia and dysphagia  Continue PT/OT  S/P EGD and PEG tube- dietary consult apprecaited

## 2023-07-13 NOTE — ASSESSMENT & PLAN NOTE
No increase in chronic pain at this time  Continue pain regimen w/ tylenol tid PRN, lidocaine cream, robaxin 500 mg q. 24 PRN, gabapentin 200 mg tid and oxycodone 2.5 mg q. 6 PRN

## 2023-07-13 NOTE — ASSESSMENT & PLAN NOTE
Recently treated for acute cystitis during prior admission  CT A/P this admission w/ findings of suspicion for right pyelonephritis  S/p abx course  Patient denies any pain or urinary complaints  Monitor clinically

## 2023-07-13 NOTE — ASSESSMENT & PLAN NOTE
Patient noted to have nausea and a few episodes of vomiting the past 2 days  Patient states that she is doing well today so far and denies any nausea or vomiting  Tube feeds have been decreased to 30 ml/hr  In consultation w/ dietary- will keep tube feeds at 30 ml/hr x 1 day and increase by 5 ml slowly to reach goal of 45 ml/hr  Abdomen is soft and non tender  Residuals are fine per nursing staff  Continue to monitor

## 2023-07-13 NOTE — PROGRESS NOTES
[address] [place] [address] Drive  [phone]  FACILITY: [facility]  Code 31 (STR)        NAME: Geetha Rivera  AGE: 77 y.o. SEX: female CODE STATUS: No CPR    DATE OF ENCOUNTER: 7/13/2023    Assessment and Plan     1. Pharyngoesophageal dysphagia  Assessment & Plan:  Patient noted to have significant dysphagia  Underwent VBS revealing no swallowing function  S/p PEG tube placement w/ tube feeds  Seen by speech for pleasure feed eval while inpatient- d/t ongoing aspiration and high risk, continue strict NPO status  Patient will need ENT eval as an outpatient for possible myotomy  Continue strict NPO status for now  Continue ST      2. Hypothyroidism due to Hashimoto's thyroiditis  Assessment & Plan:  TSH stable 6/1/23  Continue levothyroxine 75 mcg daily      3. Rheumatoid arthritis involving multiple sites with positive rheumatoid factor (HCC)  Assessment & Plan:  Denies any pain at this time  Continue plaquenil 200 mg in the AM and 100 mg in the PM  Continue methotrexate q. Thursday        4. Pyelonephritis  Assessment & Plan:  Recently treated for acute cystitis during prior admission  CT A/P this admission w/ findings of suspicion for right pyelonephritis  S/p abx course  Patient denies any pain or urinary complaints  Monitor clinically      5. Closed compression fracture of L5 vertebra, sequela  Assessment & Plan:  Noted on CT- worsened from prior exam, additionally w/ marked compression deformity at L4  No surgical intervention per neuro sx at this time  Patient denies any pain at this time  Continue lidocaine cream PRN  Continue tylenol tid  Continue robaxin 500 mg q. 24 PRN  Continue gabapentin 200 mg tid  Continue oxycodone 2.5 mg q. 6 PRN  Continue PT/OT      6.  Chronic pain syndrome  Assessment & Plan:  No increase in chronic pain at this time  Continue pain regimen w/ tylenol tid PRN, lidocaine cream, robaxin 500 mg q. 24 PRN, gabapentin 200 mg tid and oxycodone 2.5 mg q. 6 PRN      7. Depression, unspecified depression type  Assessment & Plan:  Mood stable at this time  Continue pamelor 100 mg QHS  Consider safer alternative as an outpatient  F/u w/ psychiatrist        8. Generalized weakness  Assessment & Plan:  Patient presented to the hospital w/ generalized weakness following a prior hospitalization which she was recommended rehab and refused  Possibly multifactorial in setting of electrolyte abnormalities, pyelonephritis, bacteremia and dysphagia  Continue PT/OT  S/P EGD and PEG tube- dietary consult apprecaited      9. Nausea  Assessment & Plan:  Patient noted to have nausea and a few episodes of vomiting the past 2 days  Patient states that she is doing well today so far and denies any nausea or vomiting  Tube feeds have been decreased to 30 ml/hr  In consultation w/ dietary- will keep tube feeds at 30 ml/hr x 1 day and increase by 5 ml slowly to reach goal of 45 ml/hr  Abdomen is soft and non tender  Residuals are fine per nursing staff  Continue to monitor           All medications and routine orders were reviewed and updated as needed. Chief Complaint     STR follow up visit    Past Medical and Surgica History      Past Medical History:   Diagnosis Date   • Acute blood loss anemia 9/9/2020   • Aftercare following joint replacement 9/9/2020    Patient had right reversed total shoulder arthroplasty.  Pain was controlled when he left the hospital.     • Anxiety    • Arthritis    • Cellulitis of left lower extremity 11/30/2019   • Depression    • Disease of thyroid gland     HYPO   • Dyspepsia 11/12/2019   • Dysphagia 2/6/2023   • Femur neck fracture (HCC)    • GERD (gastroesophageal reflux disease)    • Hyperthyroidism     RESOLVED: [id number]   • Hyponatremia 7/25/2022   • Leg pain 2/6/2023   • Osteoporosis    • Polio    • PVD (peripheral vascular disease) ([address])    • Right BBB/left ant fasc block    • Shoulder pain, bilateral 7/27/2021   • UTI (urinary tract infection)    • Varicella infection     LAST ASSESSED: [id number]     Past Surgical History:   Procedure Laterality Date   • APPENDECTOMY     • HIP ARTHROPLASTY Left 11/27/2017    Procedure: REMOVAL IM NAIL CONVERSION TO TOTAL HIP ARTHROPLASTY POSTERIOR APPROACH;  Surgeon: Felicitas Cushing, MD;  Location: [facility];  Service: Orthopedics   • HIP FRACTURE SURGERY     • HIP SURGERY      both hips replaced;2013 left ,2014 right   • JOINT REPLACEMENT Right     HIP TOTAL   • [place] ARTHROPLASTY GLENOHUMERAL JOINT TOTAL SHOULDER Right 9/2/2020    Procedure: ARTHROPLASTY SHOULDER REVERSE;  Surgeon: Ivonne Simms MD;  [facility];  Service: Orthopedics   • AK ARTHROPLASTY GLENOHUMERAL JOINT TOTAL SHOULDER Left 6/1/2021    Procedure: ARTHROPLASTY SHOULDER REVERSE;  Surgeon: Ivonne Simms MD;  [facility];  Service: Orthopedics   • AK CONV PREV HIP TOT HIP ARTHRP W/WO AGRFT/ALGRFT Left 10/4/2017    Procedure: [place] removal of left hip;  Surgeon: Felicitas Cushing, MD;  [facility];  Service: Orthopedics   • [place] [address] WRST SURG W/RLS TRANSVRS CARPL LIGM Right 5/24/2022    Procedure: RELEASE CARPAL TUNNEL ENDOSCOPIC-right;  Surgeon: Raoul Rivera MD;  [facility];  Service: Orthopedics   • REVISION TOTAL HIP ARTHROPLASTY Right    • TOE AMPUTATION Left 7/24/2022    Procedure: 5TH METATARSAL RESECTION WITH BOTTOM FLAP CLOSURE;  Surgeon: Renate Dunham DPM;  Location: [facility];  Service: Podiatry   • TONSILLECTOMY       No Known Allergies       History of Present Illness     Martin Bentley is a 77year old female admitted to [address] [place] [address] for STR following a hospitalization for generalized weakness. She has a PMH including but not limited to dysphagia, RA, lupus, chronic pain and ambulatory dysfunction. The patient presented to the hospital w/ generalized weakness. Patient had recent hospitalization for generalized weakness and acute cystitis.  She was recommended rehab at this time but had refused. During this past hospitalization, patient was noted to have possible pyelonephritis and E. Coli bacteremia. She completed appropriate antibiotic course. During her stay, she was also noted to have significant dysphagia. She was evaluated by GI and underwent an EGD. Patient had a PEG tube placed and was initiated on tube feeds. Patient was also seen by ENT for possible myotomy. Patient recommended to continue follow up as an outpatient. Given compression fracture, patient was evaluated by neurosurgery. Recommended conservative management w/ outpatient f/u. Patient's overall status improved and she was recommended STR by therapy services. The patient is seen today for a routine STR follow up visit. The patient was seen and examined at bedside in stable condition. She is alert and oriented x 3. Patient reports that she is feeling okay today. She was noted to have episodes of nausea and vomiting the past two days. Today she states that she is feeling better. She denies any nausea at this time. She states that she would like to eat food by mouth but is unable to. She denies any pain at this time and is not in any acute distress. Overall, she denies CP/SOB/N/V/D. Denies lightheadedness, dizziness, headaches, vision changes. Patient states they are eating well and staying hydrated. Denies any bowel or bladder issues. No concerns from nursing staff. The patient's allergies, past medical, surgical, social and family history were reviewed and unchanged. Review of Systems     Review of Systems   Constitutional: Negative. Negative for appetite change, chills, fatigue and fever. HENT: Negative. Negative for congestion, facial swelling, rhinorrhea, sneezing and sore throat. Eyes: Negative. Negative for redness, itching and visual disturbance. Respiratory: Negative. Negative for cough, chest tightness, shortness of breath and wheezing.     Cardiovascular: Negative for chest pain, palpitations and leg swelling. Gastrointestinal: Negative for abdominal distention, abdominal pain, constipation, nausea and vomiting. Genitourinary: Negative. Negative for difficulty urinating, flank pain, frequency and hematuria. Musculoskeletal: Positive for gait problem. Negative for arthralgias, back pain, myalgias and neck stiffness. Skin: Negative for pallor, rash and wound. Neurological: Positive for weakness. Negative for dizziness, light-headedness, numbness and headaches. Psychiatric/Behavioral: Negative for agitation, confusion and sleep disturbance. The patient is not nervous/anxious. Objective     Vitals:   Vitals:    07/13/23 1139   BP: 110/67   Pulse: 73   Resp: 16   Temp: (!) 97.4 °F (36.3 °C)   SpO2: 96%         Physical Exam  Vitals reviewed. Constitutional:       General: She is not in acute distress. Appearance: Normal appearance. She is not ill-appearing, toxic-appearing or diaphoretic. HENT:      Head: Normocephalic and atraumatic. Right Ear: External ear normal.      Left Ear: External ear normal.      Nose: Nose normal. No congestion or rhinorrhea. Mouth/Throat:      Mouth: Mucous membranes are moist.      Pharynx: Oropharynx is clear. No oropharyngeal exudate or posterior oropharyngeal erythema. Eyes:      General:         Right eye: No discharge. Left eye: No discharge. Extraocular Movements: Extraocular movements intact. Conjunctiva/sclera: Conjunctivae normal.      Pupils: Pupils are equal, round, and reactive to light. Cardiovascular:      Rate and Rhythm: Normal rate and regular rhythm. Pulses: Normal pulses. Heart sounds: Normal heart sounds. No murmur heard. No friction rub. No gallop. Pulmonary:      Effort: Pulmonary effort is normal. No respiratory distress. Breath sounds: Normal breath sounds. No wheezing. Chest:      Chest wall: No tenderness. Abdominal:      General: Abdomen is flat. Bowel sounds are normal. There is no distension. Palpations: Abdomen is soft. There is no mass. Tenderness: There is no abdominal tenderness. There is no guarding. Musculoskeletal:         General: No swelling or tenderness. Normal range of motion. Cervical back: Normal range of motion and neck supple. No rigidity or tenderness. Right lower leg: No edema. Left lower leg: No edema. Skin:     General: Skin is warm and dry. Coloration: Skin is not jaundiced. Findings: No bruising, erythema or rash. Neurological:      General: No focal deficit present. Mental Status: She is alert and oriented to person, place, and time. Mental status is at baseline. Sensory: No sensory deficit. Motor: Weakness present. Coordination: Coordination normal.      Gait: Gait abnormal.   Psychiatric:         Mood and Affect: Mood normal.         Behavior: Behavior normal.         Thought Content: Thought content normal.         Judgment: Judgment normal.         Pertinent Laboratory/Diagnostic Studies:   Reviewed in facility chart-stable    HGB 10.4  WBC 9.8  PLTS 633    BUN 23  CREAT 0.6    K 4.9    Current Medications   Medications reviewed and updated see facility [facility] for details. Current Outpatient Medications:   •  acetaminophen (TYLENOL) 650 mg CR tablet, Take 2 tablets (1,300 mg total) by mouth 3 (three) times a day, Disp: 30 tablet, Rfl: 0  •  Cholecalciferol 50 MCG (2000 UT) TBDP, Take 1 tablet (2,000 Units total) by mouth daily, Disp: 90 tablet, Rfl: 1  •  folic acid (FOLVITE) 1 mg tablet, Take 2 mg by mouth daily, Disp: , Rfl:   •  gabapentin (NEURONTIN) 100 mg capsule, Take 2 capsules (200 mg total) by mouth 3 (three) times a day, Disp: 90 capsule, Rfl: 1  •  hydroxychloroquine (PLAQUENIL) 200 mg tablet, Take 1 tablet (200 mg total) by mouth every morning AND 0.5 tablets (100 mg total) daily at bedtime.  1 tab in am 1/2 tab in pm., Disp: 135 tablet, Rfl: 1  • levothyroxine 75 mcg tablet, Take 1 tablet (75 mcg total) by mouth daily in the early morning, Disp: 90 tablet, Rfl: 1  •  lidocaine (LMX) 4 % cream, Apply topically as needed for mild pain, Disp: 30 g, Rfl: 0  •  melatonin 3 mg, Take 1 tablet (3 mg total) by mouth daily at bedtime, Disp: 30 tablet, Rfl: 0  •  methocarbamol (ROBAXIN) 500 mg tablet, Take 1 tablet (500 mg total) by mouth daily as needed for muscle spasms, Disp: 30 tablet, Rfl: 3  •  methotrexate 2.5 MG tablet, 3 tablets by mouth once a week on Thursday, Disp:  , Rfl:   •  Multiple Vitamins-Minerals (CENTRUM SILVER PO), Take 1 tablet by mouth daily, Disp: , Rfl:   •  nortriptyline (PAMELOR) 50 mg capsule, Take 2 capsules (100 mg total) by mouth daily at bedtime, Disp: 60 capsule, Rfl: 2  •  oxyCODONE (ROXICODONE) 5 immediate release tablet, Take 0.5 tablets (2.5 mg total) by mouth every 6 (six) hours as needed for severe pain for up to 10 days Max Daily Amount: 10 mg, Disp: 10 tablet, Rfl: 0  •  polyethylene glycol (MIRALAX) 17 g packet, Take 17 g by mouth daily Do not start before April 18, 2023., Disp: , Rfl: 0  •  rOPINIRole (REQUIP) 2 mg tablet, Take 1 tablet (2 mg total) by mouth daily at bedtime, Disp: 90 tablet, Rfl: 1  •  senna-docusate sodium (SENOKOT S) 8.6-50 mg per tablet, Take 1 tablet by mouth 2 (two) times a day, Disp: 20 tablet, Rfl: 0     Please note:  Voice-recognition software may have been used in the preparation of this document. Occasional wrong word or "sound-alike" substitutions may have occurred due to the inherent limitations of voice recognition software. Interpretation should be guided by context.          CONNIE Raymundo  7/13/2023  11:28 AM

## 2023-07-13 NOTE — ASSESSMENT & PLAN NOTE
Mood stable at this time  Continue pamelor 100 mg QHS  Consider safer alternative as an outpatient  F/u w/ psychiatrist

## 2023-07-13 NOTE — ASSESSMENT & PLAN NOTE
Denies any pain at this time  Continue plaquenil 200 mg in the AM and 100 mg in the PM  Continue methotrexate q.  Thursday

## 2023-07-13 NOTE — ASSESSMENT & PLAN NOTE
Noted on CT- worsened from prior exam, additionally w/ marked compression deformity at L4  No surgical intervention per neuro sx at this time  Patient denies any pain at this time  Continue lidocaine cream PRN  Continue tylenol tid  Continue robaxin 500 mg q. 24 PRN  Continue gabapentin 200 mg tid  Continue oxycodone 2.5 mg q. 6 PRN  Continue PT/OT

## 2023-07-13 NOTE — ASSESSMENT & PLAN NOTE
Patient noted to have significant dysphagia  Underwent VBS revealing no swallowing function  S/p PEG tube placement w/ tube feeds  Seen by speech for pleasure feed eval while inpatient- d/t ongoing aspiration and high risk, continue strict NPO status  Patient will need ENT eval as an outpatient for possible myotomy  Continue strict NPO status for now  Continue ST

## 2023-07-16 PROBLEM — Z00.00 ENCOUNTER FOR ANNUAL WELLNESS EXAM IN MEDICARE PATIENT: Status: RESOLVED | Noted: 2021-06-14 | Resolved: 2023-07-16

## 2023-07-17 ENCOUNTER — PATIENT OUTREACH (OUTPATIENT)
Dept: CASE MANAGEMENT | Facility: OTHER | Age: 78
End: 2023-07-17

## 2023-07-17 ENCOUNTER — NURSING HOME VISIT (OUTPATIENT)
Dept: GERIATRICS | Facility: OTHER | Age: 78
End: 2023-07-17
Payer: MEDICARE

## 2023-07-17 VITALS
OXYGEN SATURATION: 97 % | HEART RATE: 68 BPM | DIASTOLIC BLOOD PRESSURE: 66 MMHG | SYSTOLIC BLOOD PRESSURE: 108 MMHG | TEMPERATURE: 97.8 F

## 2023-07-17 DIAGNOSIS — S32.050S CLOSED COMPRESSION FRACTURE OF L5 VERTEBRA, SEQUELA: ICD-10-CM

## 2023-07-17 DIAGNOSIS — R78.81 BACTEREMIA DUE TO ESCHERICHIA COLI: ICD-10-CM

## 2023-07-17 DIAGNOSIS — R53.1 GENERALIZED WEAKNESS: ICD-10-CM

## 2023-07-17 DIAGNOSIS — B96.20 BACTEREMIA DUE TO ESCHERICHIA COLI: ICD-10-CM

## 2023-07-17 DIAGNOSIS — R26.2 AMBULATORY DYSFUNCTION: ICD-10-CM

## 2023-07-17 DIAGNOSIS — R13.14 PHARYNGOESOPHAGEAL DYSPHAGIA: Primary | ICD-10-CM

## 2023-07-17 DIAGNOSIS — F41.9 ANXIETY: ICD-10-CM

## 2023-07-17 PROCEDURE — 99309 SBSQ NF CARE MODERATE MDM 30: CPT

## 2023-07-17 NOTE — ASSESSMENT & PLAN NOTE
Noted on CT worsened from prior exam with additional marked compression deformity at L4  No surgical intervention per neurosurgery at this time  Pain controlled continue with lidocaine cream as needed Tylenol around-the-clock Robaxin as needed gabapentin 3 times daily and oxycodone as needed  Continue PT/OT  Brace was deferred due to recent PEG tube placement

## 2023-07-17 NOTE — PROGRESS NOTES
Chart review completed. Email sent to [facility] ([facility]) SNF Coordinator to obtain an update on patient. This Admin Coordinator will continue to monitor via chart review throughout episode.

## 2023-07-17 NOTE — ASSESSMENT & PLAN NOTE
Patient has significant dysphagia inpatient hospitalization  Status post PEG tube placement with tube feeds seen by speech for pleasure feeds eval while inpatient due to ongoing aspiration and high risk continue strict n.p.o. status  Will need ENT evaluation as an outpatient for possible myotomy  Continue n.p.o. status  Continue speech therapy

## 2023-07-17 NOTE — PROGRESS NOTES
[address]  [phone]  FACILITY: [facility]  Code 31 (STR)        NAME: Natacha Rivera  AGE: 77 y.o. SEX: female CODE STATUS: No CPR    DATE OF ENCOUNTER: 7/17/2023    Assessment and Plan     1. Pharyngoesophageal dysphagia  Assessment & Plan:  Patient has significant dysphagia inpatient hospitalization  Status post PEG tube placement with tube feeds seen by speech for pleasure feeds eval while inpatient due to ongoing aspiration and high risk continue strict n.p.o. status  Will need ENT evaluation as an outpatient for possible myotomy  Continue n.p.o. status  Continue speech therapy      2. Closed compression fracture of L5 vertebra, sequela  Assessment & Plan:  Noted on CT worsened from prior exam with additional marked compression deformity at L4  No surgical intervention per neurosurgery at this time  Pain controlled continue with lidocaine cream as needed Tylenol around-the-clock Robaxin as needed gabapentin 3 times daily and oxycodone as needed  Continue PT/OT  Brace was deferred due to recent PEG tube placement      3. Ambulatory dysfunction  Assessment & Plan:  Continue PT/OT  Maintain fall and safety precautions   following for dispo planning      4. Bacteremia due to Escherichia coli  Assessment & Plan:  Resolved  Completed antibiotic course      5. Generalized weakness  Assessment & Plan:  Multifactorial  Continue PT/OT  Maintain fall and safety precautions  Continue tube feedings for adequate intake and nutrition  Management of acute and chronic medical conditions      6. Anxiety  Assessment & Plan:  Patient complaining of anxiety like symptoms, has history of anxiety   Currently on Nortriptyline 100 mg QHS  Continue psychosocial support   Monitor symptoms          All medications and routine orders were reviewed and updated as needed.     Chief Complaint     STR follow up visit      Past Medical and Surgica History      Past Medical History:   Diagnosis Date   • Acute blood loss anemia 9/9/2020   • Aftercare following joint replacement 9/9/2020    Patient had right reversed total shoulder arthroplasty.  Pain was controlled when he left the hospital.     • Anxiety    • Arthritis    • Cellulitis of left lower extremity 11/30/2019   • Depression    • Disease of thyroid gland     HYPO   • Dyspepsia 11/12/2019   • Dysphagia 2/6/2023   • Femur neck fracture (HCC)    • GERD (gastroesophageal reflux disease)    • Hyperthyroidism     RESOLVED: [id number]   • Hyponatremia 7/25/2022   • Leg pain 2/6/2023   • Osteoporosis    • Polio    • PVD (peripheral vascular disease) ([address])    • Right BBB/left ant fasc block    • Shoulder pain, bilateral 7/27/2021   • UTI (urinary tract infection)    • Varicella infection     LAST ASSESSED: [id number]     Past Surgical History:   Procedure Laterality Date   • APPENDECTOMY     • HIP ARTHROPLASTY Left 11/27/2017    Procedure: REMOVAL IM NAIL CONVERSION TO TOTAL HIP ARTHROPLASTY POSTERIOR APPROACH;  Surgeon: Karime Pacheco MD;  Location: [facility];  Service: Orthopedics   • HIP FRACTURE SURGERY     • HIP SURGERY      both hips replaced;2013 left ,2014 right   • JOINT REPLACEMENT Right     HIP TOTAL   • [place] ARTHROPLASTY GLENOHUMERAL JOINT TOTAL SHOULDER Right 9/2/2020    Procedure: ARTHROPLASTY SHOULDER REVERSE;  Surgeon: Enrique Kelly MD;  Location: [facility];  Service: Orthopedics   • [place] ARTHROPLASTY GLENOHUMERAL JOINT TOTAL SHOULDER Left 6/1/2021    Procedure: ARTHROPLASTY SHOULDER REVERSE;  Surgeon: Enrique Kelly MD;  Location: [facility];  Service: Orthopedics   • OK CONV PREV HIP TOT HIP ARTHRP W/WO AGRFT/ALGRFT Left 10/4/2017    Procedure: Hareware removal of left hip;  Surgeon: Karime Pacheco MD;  Location: [facility];  Service: Orthopedics   • [place] [address] WRST SURG W/RLS TRANSVRS CARPL LIGM Right 5/24/2022    Procedure: RELEASE CARPAL TUNNEL ENDOSCOPIC-right;  Surgeon: Marj Lan MD;  Location: [facility];  Service: Orthopedics   • REVISION TOTAL HIP ARTHROPLASTY Right    • TOE AMPUTATION Left 7/24/2022    Procedure: 5TH METATARSAL RESECTION WITH BOTTOM FLAP CLOSURE;  Surgeon: Jocelyn Agarwal DPM;  Location: [facility];  Service: Podiatry   • TONSILLECTOMY       No Known Allergies       History of Present Illness     Patient is a 18-year-old female being seen at short-term rehab for follow-up of acute and chronic medical conditions. She was recently hospitalized for generalized weakness. She has past medical history but not limited to dysphagia including, RA, lupus, chronic pain and ambulatory dysfunction. Patient presented to the hospital with generalized weakness. She had recent hospitalization for generalized weakness and acute cystitis. She was recommended for rehab at that time however refused. During hospital course she was treated for UTI. She was also found to have significant dysphagia requiring PEG tube placement. She was seen by ENT for possible myotomy patient was then recommended to follow-up as an outpatient. She was also evaluated by neurosurgery for compression fracture at which time they recommended conservative management with outpatient follow-up. Patient was discharged to short-term rehab for therapy services. The patient's allergies, past medical, surgical, social and family history were reviewed and unchanged. Review of Systems     Review of Systems   Constitutional: Negative for chills and fever. HENT: Negative for ear pain and sore throat. Eyes: Negative for pain and visual disturbance. Respiratory: Negative for cough and shortness of breath. Cardiovascular: Negative for chest pain and palpitations. Gastrointestinal: Negative for abdominal pain and vomiting. Genitourinary: Negative for dysuria and hematuria. Musculoskeletal: Negative for arthralgias and back pain. Skin: Negative for color change and rash.         pruritus upper back Neurological: Negative for seizures and syncope. Psychiatric/Behavioral: The patient is nervous/anxious. All other systems reviewed and are negative. Objective     Vitals:   Vitals:    07/17/23 1137   BP: 108/66   Pulse: 68   Temp: 97.8 °F (36.6 °C)   SpO2: 97%         Physical Exam  Vitals reviewed. Constitutional:       General: She is not in acute distress. Appearance: Normal appearance. She is underweight. She is not ill-appearing, toxic-appearing or diaphoretic. HENT:      Head: Normocephalic and atraumatic. Eyes:      Conjunctiva/sclera: Conjunctivae normal.   Cardiovascular:      Rate and Rhythm: Normal rate and regular rhythm. Pulses: Normal pulses. Heart sounds: Normal heart sounds. No murmur heard. Pulmonary:      Effort: Pulmonary effort is normal. No respiratory distress. Breath sounds: Normal breath sounds. Abdominal:      General: Bowel sounds are normal. There is no distension. Palpations: Abdomen is soft. Tenderness: There is no abdominal tenderness. Musculoskeletal:      Right lower leg: No edema. Left lower leg: No edema. Skin:     General: Skin is warm and dry. Neurological:      General: No focal deficit present. Mental Status: She is alert and oriented to person, place, and time. Motor: Weakness present. Psychiatric:         Mood and Affect: Mood is anxious. Behavior: Behavior normal.         Thought Content: Thought content normal.         Cognition and Memory: Cognition and memory normal.         Pertinent Laboratory/Diagnostic Studies:   Reviewed in facility chart-stable      Current Medications   Medications reviewed and updated see facility [facility] for details.       Current Outpatient Medications:   •  acetaminophen (TYLENOL) 650 mg CR tablet, Take 2 tablets (1,300 mg total) by mouth 3 (three) times a day, Disp: 30 tablet, Rfl: 0  •  Cholecalciferol 50 MCG (2000 UT) TBDP, Take 1 tablet (2,000 Units total) by mouth daily, Disp: 90 tablet, Rfl: 1  •  folic acid (FOLVITE) 1 mg tablet, Take 2 mg by mouth daily, Disp: , Rfl:   •  gabapentin (NEURONTIN) 100 mg capsule, Take 2 capsules (200 mg total) by mouth 3 (three) times a day, Disp: 90 capsule, Rfl: 1  •  hydroxychloroquine (PLAQUENIL) 200 mg tablet, Take 1 tablet (200 mg total) by mouth every morning AND 0.5 tablets (100 mg total) daily at bedtime. 1 tab in am 1/2 tab in pm., Disp: 135 tablet, Rfl: 1  •  levothyroxine 75 mcg tablet, Take 1 tablet (75 mcg total) by mouth daily in the early morning, Disp: 90 tablet, Rfl: 1  •  lidocaine (LMX) 4 % cream, Apply topically as needed for mild pain, Disp: 30 g, Rfl: 0  •  melatonin 3 mg, Take 1 tablet (3 mg total) by mouth daily at bedtime, Disp: 30 tablet, Rfl: 0  •  methocarbamol (ROBAXIN) 500 mg tablet, Take 1 tablet (500 mg total) by mouth daily as needed for muscle spasms, Disp: 30 tablet, Rfl: 3  •  methotrexate 2.5 MG tablet, 3 tablets by mouth once a week on Thursday, Disp:  , Rfl:   •  Multiple Vitamins-Minerals (CENTRUM SILVER PO), Take 1 tablet by mouth daily, Disp: , Rfl:   •  nortriptyline (PAMELOR) 50 mg capsule, Take 2 capsules (100 mg total) by mouth daily at bedtime, Disp: 60 capsule, Rfl: 2  •  oxyCODONE (ROXICODONE) 5 immediate release tablet, Take 0.5 tablets (2.5 mg total) by mouth every 6 (six) hours as needed for severe pain for up to 10 days Max Daily Amount: 10 mg, Disp: 10 tablet, Rfl: 0  •  polyethylene glycol (MIRALAX) 17 g packet, Take 17 g by mouth daily Do not start before April 18, 2023., Disp: , Rfl: 0  •  rOPINIRole (REQUIP) 2 mg tablet, Take 1 tablet (2 mg total) by mouth daily at bedtime, Disp: 90 tablet, Rfl: 1  •  senna-docusate sodium (SENOKOT S) 8.6-50 mg per tablet, Take 1 tablet by mouth 2 (two) times a day, Disp: 20 tablet, Rfl: 0     Plan discussed with Dr. Efra Mcknight. Dr. Efra Mcknight noted agreement with assessment and plan.       Please note:  Voice-recognition software may have been used in the preparation of this document. Occasional wrong word or "sound-alike" substitutions may have occurred due to the inherent limitations of voice recognition software. Interpretation should be guided by context.          CONNIE Yoo  7/17/2023  12:27 PM

## 2023-07-17 NOTE — ASSESSMENT & PLAN NOTE
Multifactorial  Continue PT/OT  Maintain fall and safety precautions  Continue tube feedings for adequate intake and nutrition  Management of acute and chronic medical conditions

## 2023-07-17 NOTE — ASSESSMENT & PLAN NOTE
Patient complaining of anxiety like symptoms, has history of anxiety   Currently on Nortriptyline 100 mg QHS  Continue psychosocial support   Monitor symptoms

## 2023-07-19 ENCOUNTER — PATIENT OUTREACH (OUTPATIENT)
Dept: CASE MANAGEMENT | Facility: OTHER | Age: 78
End: 2023-07-19

## 2023-07-19 ENCOUNTER — NURSING HOME VISIT (OUTPATIENT)
Dept: GERIATRICS | Facility: OTHER | Age: 78
End: 2023-07-19
Payer: MEDICARE

## 2023-07-19 VITALS
TEMPERATURE: 97.2 F | SYSTOLIC BLOOD PRESSURE: 124 MMHG | HEART RATE: 66 BPM | BODY MASS INDEX: 19.26 KG/M2 | OXYGEN SATURATION: 96 % | RESPIRATION RATE: 18 BRPM | WEIGHT: 89 LBS | DIASTOLIC BLOOD PRESSURE: 68 MMHG

## 2023-07-19 DIAGNOSIS — R53.1 GENERALIZED WEAKNESS: ICD-10-CM

## 2023-07-19 DIAGNOSIS — R13.14 PHARYNGOESOPHAGEAL DYSPHAGIA: Primary | ICD-10-CM

## 2023-07-19 DIAGNOSIS — M05.79 RHEUMATOID ARTHRITIS INVOLVING MULTIPLE SITES WITH POSITIVE RHEUMATOID FACTOR (HCC): Chronic | ICD-10-CM

## 2023-07-19 DIAGNOSIS — S32.050S CLOSED COMPRESSION FRACTURE OF L5 VERTEBRA, SEQUELA: ICD-10-CM

## 2023-07-19 DIAGNOSIS — N12 PYELONEPHRITIS: ICD-10-CM

## 2023-07-19 DIAGNOSIS — G89.4 CHRONIC PAIN SYNDROME: ICD-10-CM

## 2023-07-19 PROCEDURE — 99309 SBSQ NF CARE MODERATE MDM 30: CPT

## 2023-07-19 NOTE — PROGRESS NOTES
Update obtained from Echo Ewing the patient continues with Therapy no LCD at this time. This Admin Coordinator will continue to monitor via chart review.

## 2023-07-19 NOTE — ASSESSMENT & PLAN NOTE
Multifactorial- in setting of electrolyte abnormalities, pyelonephritis, bacteremia and dysphagia  Maintain fall precautions  Continue PT/OT  Ensure adequate nutrition and hydration   for dispo planning

## 2023-07-19 NOTE — ASSESSMENT & PLAN NOTE
Patient w/ significant dysphagia w/ VBS revealing no swallowing function  S/p PEG tube placement w/ tube feeds  Patient evaluated by ENT for possible myotomy- received preop clearance  Continue ST- trial periods of food  Continue tube feeds- at goal rate of 45 ml/hr  F/u w/ ENT as directed

## 2023-07-19 NOTE — ASSESSMENT & PLAN NOTE
Noted on CT- worsened from prior exam w/ additional marked compression deformity at L4   No surgical intervention at this time per neuro sx  Continue pain control w/ lidocaine cream, ATC Tylenol tid, gabapentin tid, robaxin q. 24 PRN and oxycodone 2.5 mg q. 6 PRN  Continue PT/OT  Brace deferred d/t recent PEG tube placement  F/u w/ neuro sx as directed

## 2023-07-19 NOTE — ASSESSMENT & PLAN NOTE
Reports some pain in her back and right shoulder today  Continue plaquenil 200 mg in the AM and 100 mg in the PM  Continue methotrexate q.  Almaz Avila

## 2023-07-19 NOTE — ASSESSMENT & PLAN NOTE
Patient reports some lower back and right shoulder pain today  Continue pain control w/ ATC tylenol tid, lidocaine cream, robaxin q. 24 PRN, gabapentin tid and oxycodone 2.5 mg q. 6 PRN

## 2023-07-19 NOTE — PROGRESS NOTES
[address]  [phone]  FACILITY: [facility]  Code 31 (STR)        NAME: Geetha Rivera  AGE: 77 y.o. SEX: female CODE STATUS: No CPR    DATE OF ENCOUNTER: 7/19/2023    Assessment and Plan     1. Pharyngoesophageal dysphagia  Assessment & Plan:  Patient w/ significant dysphagia w/ VBS revealing no swallowing function  S/p PEG tube placement w/ tube feeds  Patient evaluated by ENT for possible myotomy- received preop clearance  Continue ST- trial periods of food  Continue tube feeds- at goal rate of 45 ml/hr  F/u w/ ENT as directed      2. Rheumatoid arthritis involving multiple sites with positive rheumatoid factor (HCC)  Assessment & Plan:  Reports some pain in her back and right shoulder today  Continue plaquenil 200 mg in the AM and 100 mg in the PM  Continue methotrexate q. Thurday      3. Pyelonephritis  Assessment & Plan:  Resolved  S/p ABX  Patient denies any urinary complaints      4. Closed compression fracture of L5 vertebra, sequela  Assessment & Plan:  Noted on CT- worsened from prior exam w/ additional marked compression deformity at L4   No surgical intervention at this time per neuro sx  Continue pain control w/ lidocaine cream, ATC Tylenol tid, gabapentin tid, robaxin q. 24 PRN and oxycodone 2.5 mg q. 6 PRN  Continue PT/OT  Brace deferred d/t recent PEG tube placement  F/u w/ neuro sx as directed      5. Chronic pain syndrome  Assessment & Plan:  Patient reports some lower back and right shoulder pain today  Continue pain control w/ ATC tylenol tid, lidocaine cream, robaxin q. 24 PRN, gabapentin tid and oxycodone 2.5 mg q. 6 PRN      6.  Generalized weakness  Assessment & Plan:  Multifactorial- in setting of electrolyte abnormalities, pyelonephritis, bacteremia and dysphagia  Maintain fall precautions  Continue PT/OT  Ensure adequate nutrition and hydration   for dispo planning           All medications and routine orders were reviewed and updated as needed. Chief Complaint     STR follow up visit    Past Medical and Surgica History      Past Medical History:   Diagnosis Date   • Acute blood loss anemia 9/9/2020   • Aftercare following joint replacement 9/9/2020    Patient had right reversed total shoulder arthroplasty.  Pain was controlled when he left the hospital.     • Anxiety    • Arthritis    • Cellulitis of left lower extremity 11/30/2019   • Depression    • Disease of thyroid gland     HYPO   • Dyspepsia 11/12/2019   • Dysphagia 2/6/2023   • Femur neck fracture (HCC)    • GERD (gastroesophageal reflux disease)    • Hyperthyroidism     RESOLVED: [id number]   • Hyponatremia 7/25/2022   • Leg pain 2/6/2023   • Osteoporosis    • Polio    • PVD (peripheral vascular disease) ([address])    • Right BBB/left ant fasc block    • Shoulder pain, bilateral 7/27/2021   • UTI (urinary tract infection)    • Varicella infection     LAST ASSESSED: [id number]     Past Surgical History:   Procedure Laterality Date   • APPENDECTOMY     • HIP ARTHROPLASTY Left 11/27/2017    Procedure: REMOVAL IM NAIL CONVERSION TO TOTAL HIP ARTHROPLASTY POSTERIOR APPROACH;  Surgeon: Piero Daniel MD;  Location: [facility];  Service: Orthopedics   • HIP FRACTURE SURGERY     • HIP SURGERY      both hips replaced;2013 left ,2014 right   • JOINT REPLACEMENT Right     HIP TOTAL   • NH ARTHROPLASTY GLENOHUMERAL JOINT TOTAL SHOULDER Right 9/2/2020    Procedure: ARTHROPLASTY SHOULDER REVERSE;  Surgeon: Jonny Alanis MD;  Location: [facility];  Service: Orthopedics   • NH ARTHROPLASTY GLENOHUMERAL JOINT TOTAL SHOULDER Left 6/1/2021    Procedure: ARTHROPLASTY SHOULDER REVERSE;  Surgeon: Jonny Alanis MD;  Location[facility];  Service: Orthopedics   • NH CONV PREV HIP TOT HIP ARTHRP W/WO AGRFT/ALGRFT Left 10/4/2017    Procedure: Hareware removal of left hip;  Surgeon: Piero Daniel MD;  Location: [facility];  Service: Orthopedics   • [place] [address] WRST SURG W/RLS TRANSVRS CARPL LIGM Right 5/24/2022    Procedure: RELEASE CARPAL TUNNEL ENDOSCOPIC-right;  Surgeon: Aminta Gomez MD;  Location: [facility];  Service: Orthopedics   • REVISION TOTAL HIP ARTHROPLASTY Right    • TOE AMPUTATION Left 7/24/2022    Procedure: 5TH METATARSAL RESECTION WITH BOTTOM FLAP CLOSURE;  Surgeon: Awais Haider DPM;  Location: [facility];  Service: Podiatry   • TONSILLECTOMY       No Known Allergies       History of Present Illness     Cornelius Radford is a 77year old female admitted to [facility] for STR following a hospitalization for generalized weakness. She has a PMH including but not limited to dysphagia, RA, lupus, chronic pain and ambulatory dysfunction.     The patient presented to the hospital w/ generalized weakness. Patient had recent hospitalization for generalized weakness and acute cystitis. She was recommended rehab at this time but had refused. During this past hospitalization, patient was noted to have possible pyelonephritis and E. Coli bacteremia. She completed appropriate antibiotic course. During her stay, she was also noted to have significant dysphagia. She was evaluated by GI and underwent an EGD. Patient had a PEG tube placed and was initiated on tube feeds. Patient was also seen by ENT for possible myotomy. Patient recommended to continue follow up as an outpatient. Given compression fracture, patient was evaluated by neurosurgery. Recommended conservative management w/ outpatient f/u. Patient's overall status improved and she was recommended STR by therapy services.     The patient is seen today for a routine STR follow up visit.     The patient was seen and examined at bedside in stable condition. She is alert and oriented x 3. Patient reports that she is feeling okay today. She reports some pain in her back and right shoulder. She has hx of chronic pain. Otherwise, she denies any other complaints and is not in any acute distress.  She reports that her tube feeds are going better and she no longer has any nausea or vomiting. Overall, She denies CP/SOB/N/V/D. Denies lightheadedness, dizziness, headaches, vision changes. Patient states they are eating well and staying hydrated. Denies any bowel or bladder issues. No concerns from nursing staff. The patient's allergies, past medical, surgical, social and family history were reviewed and unchanged. Review of Systems     Review of Systems   Constitutional: Negative. Negative for appetite change, chills, fatigue and fever. HENT: Negative. Negative for congestion, facial swelling, rhinorrhea, sneezing and sore throat. Eyes: Negative. Negative for redness, itching and visual disturbance. Respiratory: Negative. Negative for cough, chest tightness, shortness of breath and wheezing. Cardiovascular: Negative for chest pain, palpitations and leg swelling. Gastrointestinal: Negative for abdominal distention, abdominal pain, constipation, nausea and vomiting. PEG tube in place   Genitourinary: Negative. Negative for difficulty urinating, flank pain, frequency and hematuria. Musculoskeletal: Positive for arthralgias and gait problem. Negative for back pain, myalgias and neck stiffness. Skin: Negative for pallor, rash and wound. Neurological: Positive for weakness. Negative for dizziness, light-headedness, numbness and headaches. Psychiatric/Behavioral: Negative for agitation, confusion and sleep disturbance. The patient is not nervous/anxious. Objective     Vitals:   Vitals:    07/19/23 1001   BP: 124/68   Pulse: 66   Resp: 18   Temp: (!) 97.2 °F (36.2 °C)   SpO2: 96%         Physical Exam  Vitals reviewed. Constitutional:       General: She is not in acute distress. Appearance: Normal appearance. She is not ill-appearing, toxic-appearing or diaphoretic. HENT:      Head: Normocephalic and atraumatic.       Right Ear: External ear normal.      Left Ear: External ear normal.      Nose: Nose normal. No congestion or rhinorrhea. Mouth/Throat:      Mouth: Mucous membranes are moist.      Pharynx: Oropharynx is clear. No oropharyngeal exudate or posterior oropharyngeal erythema. Eyes:      General:         Right eye: No discharge. Left eye: No discharge. Extraocular Movements: Extraocular movements intact. Conjunctiva/sclera: Conjunctivae normal.      Pupils: Pupils are equal, round, and reactive to light. Cardiovascular:      Rate and Rhythm: Normal rate and regular rhythm. Pulses: Normal pulses. Heart sounds: Normal heart sounds. No murmur heard. No friction rub. No gallop. Pulmonary:      Effort: Pulmonary effort is normal. No respiratory distress. Breath sounds: Normal breath sounds. No wheezing. Chest:      Chest wall: No tenderness. Abdominal:      General: Abdomen is flat. Bowel sounds are normal. There is no distension. Palpations: Abdomen is soft. There is no mass. Tenderness: There is no abdominal tenderness. There is no guarding. Comments: PEG tube in place   Musculoskeletal:         General: No swelling or tenderness. Normal range of motion. Cervical back: Normal range of motion and neck supple. No rigidity or tenderness. Right lower leg: No edema. Left lower leg: No edema. Skin:     General: Skin is warm and dry. Coloration: Skin is not jaundiced. Findings: No bruising, erythema or rash. Neurological:      General: No focal deficit present. Mental Status: She is alert and oriented to person, place, and time. Mental status is at baseline. Sensory: No sensory deficit. Motor: Weakness present. Coordination: Coordination normal.      Gait: Gait abnormal.   Psychiatric:         Mood and Affect: Mood normal.         Behavior: Behavior normal.         Thought Content:  Thought content normal.         Judgment: Judgment normal.         Pertinent Laboratory/Diagnostic Studies:   Reviewed in facility chart-stable    HGB 9.8  WBC 5.0  PLTS 376    BUN 20  CREAT 0.6    K 4.4    Current Medications   Medications reviewed and updated see facility [facility] for details. Current Outpatient Medications:   •  acetaminophen (TYLENOL) 650 mg CR tablet, Take 2 tablets (1,300 mg total) by mouth 3 (three) times a day, Disp: 30 tablet, Rfl: 0  •  Cholecalciferol 50 MCG (2000 UT) TBDP, Take 1 tablet (2,000 Units total) by mouth daily, Disp: 90 tablet, Rfl: 1  •  famotidine (PEPCID) 20 mg/2.5 mL oral suspension, Take 2.5 mL (20 mg total) by mouth 2 (two) times a day Before breakfast and at bedtime, Disp: 100 mL, Rfl: 11  •  folic acid (FOLVITE) 1 mg tablet, Take 2 mg by mouth daily, Disp: , Rfl:   •  gabapentin (NEURONTIN) 100 mg capsule, Take 2 capsules (200 mg total) by mouth 3 (three) times a day, Disp: 90 capsule, Rfl: 1  •  hydroxychloroquine (PLAQUENIL) 200 mg tablet, Take 1 tablet (200 mg total) by mouth every morning AND 0.5 tablets (100 mg total) daily at bedtime.  1 tab in am 1/2 tab in pm., Disp: 135 tablet, Rfl: 1  •  levothyroxine 75 mcg tablet, Take 1 tablet (75 mcg total) by mouth daily in the early morning, Disp: 90 tablet, Rfl: 1  •  lidocaine (LMX) 4 % cream, Apply topically as needed for mild pain, Disp: 30 g, Rfl: 0  •  melatonin 3 mg, Take 1 tablet (3 mg total) by mouth daily at bedtime, Disp: 30 tablet, Rfl: 0  •  methocarbamol (ROBAXIN) 500 mg tablet, Take 1 tablet (500 mg total) by mouth daily as needed for muscle spasms, Disp: 30 tablet, Rfl: 3  •  methotrexate 2.5 MG tablet, 3 tablets by mouth once a week on Thursday, Disp:  , Rfl:   •  Multiple Vitamins-Minerals (CENTRUM SILVER PO), Take 1 tablet by mouth daily, Disp: , Rfl:   •  nortriptyline (PAMELOR) 50 mg capsule, Take 2 capsules (100 mg total) by mouth daily at bedtime, Disp: 60 capsule, Rfl: 2  •  polyethylene glycol (MIRALAX) 17 g packet, Take 17 g by mouth daily Do not start before April 18, 2023., Disp: , Rfl: 0  •  rOPINIRole (REQUIP) 2 mg tablet, Take 1 tablet (2 mg total) by mouth daily at bedtime, Disp: 90 tablet, Rfl: 1  •  senna-docusate sodium (SENOKOT S) 8.6-50 mg per tablet, Take 1 tablet by mouth 2 (two) times a day, Disp: 20 tablet, Rfl: 0      Please note:  Voice-recognition software may have been used in the preparation of this document. Occasional wrong word or "sound-alike" substitutions may have occurred due to the inherent limitations of voice recognition software. Interpretation should be guided by context.          CONNIE Thompson  7/19/2023  10:00 AM

## 2023-07-24 ENCOUNTER — PATIENT OUTREACH (OUTPATIENT)
Dept: CASE MANAGEMENT | Facility: OTHER | Age: 78
End: 2023-07-24

## 2023-07-24 ENCOUNTER — NURSING HOME VISIT (OUTPATIENT)
Dept: GERIATRICS | Facility: OTHER | Age: 78
End: 2023-07-24
Payer: MEDICARE

## 2023-07-24 VITALS
WEIGHT: 88 LBS | OXYGEN SATURATION: 97 % | DIASTOLIC BLOOD PRESSURE: 62 MMHG | BODY MASS INDEX: 19.04 KG/M2 | SYSTOLIC BLOOD PRESSURE: 120 MMHG | TEMPERATURE: 97.2 F | HEART RATE: 64 BPM | RESPIRATION RATE: 18 BRPM

## 2023-07-24 DIAGNOSIS — K59.1 FUNCTIONAL DIARRHEA: ICD-10-CM

## 2023-07-24 DIAGNOSIS — R13.14 PHARYNGOESOPHAGEAL DYSPHAGIA: Primary | ICD-10-CM

## 2023-07-24 DIAGNOSIS — S32.050S CLOSED COMPRESSION FRACTURE OF L5 VERTEBRA, SEQUELA: ICD-10-CM

## 2023-07-24 DIAGNOSIS — R26.2 AMBULATORY DYSFUNCTION: ICD-10-CM

## 2023-07-24 DIAGNOSIS — M05.79 RHEUMATOID ARTHRITIS INVOLVING MULTIPLE SITES WITH POSITIVE RHEUMATOID FACTOR (HCC): Chronic | ICD-10-CM

## 2023-07-24 DIAGNOSIS — G89.4 CHRONIC PAIN SYNDROME: ICD-10-CM

## 2023-07-24 PROCEDURE — 99309 SBSQ NF CARE MODERATE MDM 30: CPT

## 2023-07-24 NOTE — ASSESSMENT & PLAN NOTE
Patient w/ significant dysphagia w/ VBS revealing no swallowing function  S/p PEG tube placement w/ tube feeds  Patient evaluated by ENT for possible myotomy- received preop clearance  Continue ST- trial periods of food  Continue tube feeds- at goal of 45 ml/hr  F/u w/ ENT as directed

## 2023-07-24 NOTE — PROGRESS NOTES
[address]  [phone]  FACILITY: [facility]  Code 31 (STR)        NAME: Geetha Rivera  AGE: 77 y.o. SEX: female CODE STATUS: No CPR    DATE OF ENCOUNTER: 7/24/2023    Assessment and Plan     1. Pharyngoesophageal dysphagia  Assessment & Plan:  Patient w/ significant dysphagia w/ VBS revealing no swallowing function  S/p PEG tube placement w/ tube feeds  Patient evaluated by ENT for possible myotomy- received preop clearance  Continue ST- trial periods of food  Continue tube feeds- at goal of 45 ml/hr  F/u w/ ENT as directed      2. Rheumatoid arthritis involving multiple sites with positive rheumatoid factor (HCC)  Assessment & Plan:  Patient denies any pain today  Continue plaquenil 200 mg in the AM and 100 mg in the PM  Continue methotrexate q. Thursday      3. Closed compression fracture of L5 vertebra, sequela  Assessment & Plan:  Noted on CT- worsened from prior exam w/ additional marked compression deformity at L4, severe compression at L5  No surgical intervention at this time per neuro sx  Continue pain control w/ lidocaine cream, ATC tylenol tid, gabapentin tid, robaxin q. 24 PRN and oxycodone 2.5 mg q. 6 PRN  Continue PT/OT  Brace deferred d/t recent PEG tube placement  F/u w/ neuro sx as directed      4. Chronic pain syndrome  Assessment & Plan:  Patient denies any pain today  Continue pain control w/ ATC tylenol tid, lidocaine cream, robaxin q. 24 PRN, gabapentin tid and oxycodone 2.5 mg q. 6 PRN      5. Ambulatory dysfunction  Assessment & Plan:  Multifactorial  Maintain fall precautions  Continue PT/OT  Ensure adequate nutrition and hydration   for dispo planning      6. Functional diarrhea  Assessment & Plan:  Patient reports 1-2 episodes of diarrhea per day  Likely in setting of tube feeds  Will monitor clinically         All medications and routine orders were reviewed and updated as needed.     Chief Complaint STR follow up visit    Past Medical and Surgica History      Past Medical History:   Diagnosis Date   • Acute blood loss anemia 9/9/2020   • Aftercare following joint replacement 9/9/2020    Patient had right reversed total shoulder arthroplasty.  Pain was controlled when he left the hospital.     • Anxiety    • Arthritis    • Cellulitis of left lower extremity 11/30/2019   • Depression    • Disease of thyroid gland     HYPO   • Dyspepsia 11/12/2019   • Dysphagia 2/6/2023   • Femur neck fracture (HCC)    • GERD (gastroesophageal reflux disease)    • Hyperthyroidism     RESOLVED: [id number]   • Hyponatremia 7/25/2022   • Leg pain 2/6/2023   • Osteoporosis    • Polio    • PVD (peripheral vascular disease) ([address])    • Right BBB/left ant fasc block    • Shoulder pain, bilateral 7/27/2021   • UTI (urinary tract infection)    • Varicella infection     LAST ASSESSED: [id number]     Past Surgical History:   Procedure Laterality Date   • APPENDECTOMY     • HIP ARTHROPLASTY Left 11/27/2017    Procedure: REMOVAL IM NAIL CONVERSION TO TOTAL HIP ARTHROPLASTY POSTERIOR APPROACH;  Surgeon: Tracey Ashley MD;  Location: [facility];  Service: Orthopedics   • HIP FRACTURE SURGERY     • HIP SURGERY      both hips replaced;2013 left ,2014 right   • JOINT REPLACEMENT Right     HIP TOTAL   • MT ARTHROPLASTY GLENOHUMERAL JOINT TOTAL SHOULDER Right 9/2/2020    Procedure: ARTHROPLASTY SHOULDER REVERSE;  Surgeon: Christine Rose MD;  Location: [facility];  Service: Orthopedics   • MT ARTHROPLASTY GLENOHUMERAL JOINT TOTAL SHOULDER Left 6/1/2021    Procedure: ARTHROPLASTY SHOULDER REVERSE;  Surgeon: Christine Rose MD;  [facility];  Service: Orthopedics   • MT CONV PREV HIP TOT HIP ARTHRP W/WO AGRFT/ALGRFT Left 10/4/2017    Procedure: Hareware removal of left hip;  Surgeon: Tracey Ashley MD;  Location[facility];  Service: Orthopedics   • MT [address] W/RLS TRANSVRS CARPL LIGM Right 5/24/2022    Procedure: RELEASE CARPAL TUNNEL ENDOSCOPIC-right;  Surgeon: Meri Pak MD;  Location: [facility];  Service: Orthopedics   • REVISION TOTAL HIP ARTHROPLASTY Right    • TOE AMPUTATION Left 7/24/2022    Procedure: 5TH METATARSAL RESECTION WITH BOTTOM FLAP CLOSURE;  Surgeon: Natacha Mao DPM;  Location: [facility];  Service: Podiatry   • TONSILLECTOMY       No Known Allergies       History of Present Illness     Samuel Nieto is a 77year old female admitted to [address] [facility] for STR following a hospitalization for generalized weakness. She has a PMH including but not limited to dysphagia, RA, lupus, chronic pain and ambulatory dysfunction.     The patient presented to the hospital w/ generalized weakness. Patient had recent hospitalization for generalized weakness and acute cystitis. She was recommended rehab at this time but had refused. During this past hospitalization, patient was noted to have possible pyelonephritis and E. Coli bacteremia. She completed appropriate antibiotic course. During her stay, she was also noted to have significant dysphagia. She was evaluated by GI and underwent an EGD. Patient had a PEG tube placed and was initiated on tube feeds. Patient was also seen by ENT for possible myotomy. Patient recommended to continue follow up as an outpatient. Given compression fracture, patient was evaluated by neurosurgery. Recommended conservative management w/ outpatient f/u. Patient's overall status improved and she was recommended STR by therapy services.     The patient is seen today for a routine STR follow up visit.     The patient was seen and examined at bedside in stable condition. She is alert and oriented x 3. Patient reports that she is feeling fine today. She denies any complaints at this time other than her abdomen feeling "puffy." She denies any pain or N/V. Overall, She denies CP/SOB/N/V/D. Denies lightheadedness, dizziness, headaches, vision changes.  Patient states they are eating well and staying hydrated. Denies any bladder issues. Reports some diarrhea but states it is only once or twice a day. She is receiving tube feeds. No concerns from nursing staff. The patient's allergies, past medical, surgical, social and family history were reviewed and unchanged. Review of Systems     Review of Systems   Constitutional: Negative. Negative for appetite change, chills, fatigue and fever. HENT: Negative. Negative for congestion, facial swelling, rhinorrhea, sneezing and sore throat. Eyes: Negative. Negative for redness, itching and visual disturbance. Respiratory: Negative. Negative for cough, chest tightness, shortness of breath and wheezing. Cardiovascular: Negative for chest pain, palpitations and leg swelling. Gastrointestinal: Positive for diarrhea. Negative for abdominal distention, abdominal pain, constipation, nausea and vomiting. Genitourinary: Negative. Negative for difficulty urinating, flank pain, frequency and hematuria. Musculoskeletal: Positive for gait problem. Negative for arthralgias, back pain, myalgias and neck stiffness. Skin: Negative for pallor, rash and wound. Neurological: Positive for weakness. Negative for dizziness, light-headedness, numbness and headaches. Psychiatric/Behavioral: Negative for agitation, confusion and sleep disturbance. The patient is not nervous/anxious. Objective     Vitals:   Vitals:    07/24/23 1054   BP: 120/62   Pulse: 64   Resp: 18   Temp: (!) 97.2 °F (36.2 °C)   SpO2: 97%         Physical Exam  Vitals reviewed. Constitutional:       General: She is not in acute distress. Appearance: Normal appearance. She is not ill-appearing, toxic-appearing or diaphoretic. HENT:      Head: Normocephalic and atraumatic. Right Ear: External ear normal.      Left Ear: External ear normal.      Nose: Nose normal. No congestion or rhinorrhea.       Mouth/Throat:      Mouth: Mucous membranes are moist.      Pharynx: Oropharynx is clear. No oropharyngeal exudate or posterior oropharyngeal erythema. Eyes:      General:         Right eye: No discharge. Left eye: No discharge. Extraocular Movements: Extraocular movements intact. Conjunctiva/sclera: Conjunctivae normal.      Pupils: Pupils are equal, round, and reactive to light. Cardiovascular:      Rate and Rhythm: Normal rate and regular rhythm. Pulses: Normal pulses. Heart sounds: Normal heart sounds. No murmur heard. No friction rub. No gallop. Pulmonary:      Effort: Pulmonary effort is normal. No respiratory distress. Breath sounds: Normal breath sounds. No wheezing. Chest:      Chest wall: No tenderness. Abdominal:      General: Abdomen is flat. Bowel sounds are normal. There is no distension. Palpations: Abdomen is soft. There is no mass. Tenderness: There is no abdominal tenderness. There is no guarding. Musculoskeletal:         General: No swelling or tenderness. Normal range of motion. Cervical back: Normal range of motion and neck supple. No rigidity or tenderness. Right lower leg: No edema. Left lower leg: No edema. Skin:     General: Skin is warm and dry. Coloration: Skin is not jaundiced. Findings: No bruising, erythema or rash. Neurological:      General: No focal deficit present. Mental Status: She is alert and oriented to person, place, and time. Mental status is at baseline. Sensory: No sensory deficit. Motor: Weakness present. Coordination: Coordination normal.      Gait: Gait abnormal.   Psychiatric:         Mood and Affect: Mood normal.         Behavior: Behavior normal.         Thought Content:  Thought content normal.         Judgment: Judgment normal.         Pertinent Laboratory/Diagnostic Studies:   Reviewed in facility chart-stable    HGB 9.8  WBC 5.0  PLTS 376    BUN 20  CREAT 0.6    K 4.4    Current Medications   Medications reviewed and updated see facility [facility] for details. Current Outpatient Medications:   •  acetaminophen (TYLENOL) 650 mg CR tablet, Take 2 tablets (1,300 mg total) by mouth 3 (three) times a day, Disp: 30 tablet, Rfl: 0  •  Cholecalciferol 50 MCG (2000 UT) TBDP, Take 1 tablet (2,000 Units total) by mouth daily, Disp: 90 tablet, Rfl: 1  •  famotidine (PEPCID) 20 mg/2.5 mL oral suspension, Take 2.5 mL (20 mg total) by mouth 2 (two) times a day Before breakfast and at bedtime, Disp: 100 mL, Rfl: 11  •  folic acid (FOLVITE) 1 mg tablet, Take 2 mg by mouth daily, Disp: , Rfl:   •  gabapentin (NEURONTIN) 100 mg capsule, Take 2 capsules (200 mg total) by mouth 3 (three) times a day, Disp: 90 capsule, Rfl: 1  •  hydroxychloroquine (PLAQUENIL) 200 mg tablet, Take 1 tablet (200 mg total) by mouth every morning AND 0.5 tablets (100 mg total) daily at bedtime.  1 tab in am 1/2 tab in pm., Disp: 135 tablet, Rfl: 1  •  levothyroxine 75 mcg tablet, Take 1 tablet (75 mcg total) by mouth daily in the early morning, Disp: 90 tablet, Rfl: 1  •  lidocaine (LMX) 4 % cream, Apply topically as needed for mild pain, Disp: 30 g, Rfl: 0  •  melatonin 3 mg, Take 1 tablet (3 mg total) by mouth daily at bedtime, Disp: 30 tablet, Rfl: 0  •  methocarbamol (ROBAXIN) 500 mg tablet, Take 1 tablet (500 mg total) by mouth daily as needed for muscle spasms, Disp: 30 tablet, Rfl: 3  •  methotrexate 2.5 MG tablet, 3 tablets by mouth once a week on Thursday, Disp:  , Rfl:   •  Multiple Vitamins-Minerals (CENTRUM SILVER PO), Take 1 tablet by mouth daily, Disp: , Rfl:   •  nortriptyline (PAMELOR) 50 mg capsule, Take 2 capsules (100 mg total) by mouth daily at bedtime, Disp: 60 capsule, Rfl: 2  •  polyethylene glycol (MIRALAX) 17 g packet, Take 17 g by mouth daily Do not start before April 18, 2023., Disp: , Rfl: 0  •  rOPINIRole (REQUIP) 2 mg tablet, Take 1 tablet (2 mg total) by mouth daily at bedtime, Disp: 90 tablet, Rfl: 1  •  senna-docusate sodium (SENOKOT S) 8.6-50 mg per tablet, Take 1 tablet by mouth 2 (two) times a day, Disp: 20 tablet, Rfl: 0     Please note:  Voice-recognition software may have been used in the preparation of this document. Occasional wrong word or "sound-alike" substitutions may have occurred due to the inherent limitations of voice recognition software. Interpretation should be guided by context.          CONNIE Mata  7/24/2023  10:39 AM

## 2023-07-24 NOTE — ASSESSMENT & PLAN NOTE
Patient reports 1-2 episodes of diarrhea per day  Likely in setting of tube feeds  Will monitor clinically

## 2023-07-24 NOTE — ASSESSMENT & PLAN NOTE
Patient denies any pain today  Continue pain control w/ ATC tylenol tid, lidocaine cream, robaxin q. 24 PRN, gabapentin tid and oxycodone 2.5 mg q. 6 PRN

## 2023-07-24 NOTE — ASSESSMENT & PLAN NOTE
Multifactorial  Maintain fall precautions  Continue PT/OT  Ensure adequate nutrition and hydration   for dispo planning

## 2023-07-24 NOTE — ASSESSMENT & PLAN NOTE
Patient denies any pain today  Continue plaquenil 200 mg in the AM and 100 mg in the PM  Continue methotrexate q.  Thursday

## 2023-07-24 NOTE — ASSESSMENT & PLAN NOTE
Noted on CT- worsened from prior exam w/ additional marked compression deformity at L4, severe compression at L5  No surgical intervention at this time per neuro sx  Continue pain control w/ lidocaine cream, ATC tylenol tid, gabapentin tid, robaxin q. 24 PRN and oxycodone 2.5 mg q. 6 PRN  Continue PT/OT  Brace deferred d/t recent PEG tube placement  F/u w/ neuro sx as directed

## 2023-07-24 NOTE — PROGRESS NOTES
Chart review completed. Email sent to [facility] ([facility]) SNF Coordinator to obtain an update on patient. This Admin Coordinator will continue to monitor via chart review throughout episode.

## 2023-07-26 ENCOUNTER — PATIENT OUTREACH (OUTPATIENT)
Dept: CASE MANAGEMENT | Facility: OTHER | Age: 78
End: 2023-07-26

## 2023-07-26 NOTE — PROGRESS NOTES
Update obtained from [facility] ([facility]) SNF Coordinator the patient continues with Therapy no LCD at this time. This Admin Coordinator will continue to monitor via chart review.

## 2023-07-27 ENCOUNTER — NURSING HOME VISIT (OUTPATIENT)
Dept: GERIATRICS | Facility: OTHER | Age: 78
End: 2023-07-27
Payer: MEDICARE

## 2023-07-27 VITALS
HEART RATE: 64 BPM | RESPIRATION RATE: 18 BRPM | TEMPERATURE: 97.2 F | WEIGHT: 88 LBS | DIASTOLIC BLOOD PRESSURE: 62 MMHG | BODY MASS INDEX: 19.04 KG/M2 | OXYGEN SATURATION: 97 % | SYSTOLIC BLOOD PRESSURE: 120 MMHG

## 2023-07-27 DIAGNOSIS — K21.9 REFLUX LARYNGITIS: ICD-10-CM

## 2023-07-27 DIAGNOSIS — G25.81 RESTLESS LEGS SYNDROME: ICD-10-CM

## 2023-07-27 DIAGNOSIS — R53.1 GENERALIZED WEAKNESS: ICD-10-CM

## 2023-07-27 DIAGNOSIS — G47.00 INSOMNIA, UNSPECIFIED TYPE: ICD-10-CM

## 2023-07-27 DIAGNOSIS — E03.8 HYPOTHYROIDISM DUE TO HASHIMOTO'S THYROIDITIS: ICD-10-CM

## 2023-07-27 DIAGNOSIS — M86.8X7 OTHER OSTEOMYELITIS OF LEFT FOOT (HCC): ICD-10-CM

## 2023-07-27 DIAGNOSIS — E56.9 VITAMIN DEFICIENCY: ICD-10-CM

## 2023-07-27 DIAGNOSIS — E06.3 HYPOTHYROIDISM DUE TO HASHIMOTO'S THYROIDITIS: ICD-10-CM

## 2023-07-27 DIAGNOSIS — K59.00 CONSTIPATION, UNSPECIFIED CONSTIPATION TYPE: ICD-10-CM

## 2023-07-27 DIAGNOSIS — S32.050S CLOSED COMPRESSION FRACTURE OF L5 VERTEBRA, SEQUELA: ICD-10-CM

## 2023-07-27 DIAGNOSIS — M54.41 ACUTE MIDLINE LOW BACK PAIN WITH RIGHT-SIDED SCIATICA: ICD-10-CM

## 2023-07-27 DIAGNOSIS — G89.4 CHRONIC PAIN SYNDROME: ICD-10-CM

## 2023-07-27 DIAGNOSIS — F32.A DEPRESSION, UNSPECIFIED DEPRESSION TYPE: ICD-10-CM

## 2023-07-27 DIAGNOSIS — K59.1 FUNCTIONAL DIARRHEA: ICD-10-CM

## 2023-07-27 DIAGNOSIS — J04.0 REFLUX LARYNGITIS: ICD-10-CM

## 2023-07-27 DIAGNOSIS — R13.14 PHARYNGOESOPHAGEAL DYSPHAGIA: Primary | ICD-10-CM

## 2023-07-27 DIAGNOSIS — M05.79 RHEUMATOID ARTHRITIS INVOLVING MULTIPLE SITES WITH POSITIVE RHEUMATOID FACTOR (HCC): Chronic | ICD-10-CM

## 2023-07-27 PROCEDURE — 99309 SBSQ NF CARE MODERATE MDM 30: CPT

## 2023-07-27 RX ORDER — LEVOTHYROXINE SODIUM 0.07 MG/1
75 TABLET ORAL
Qty: 30 TABLET | Refills: 0 | Status: SHIPPED | OUTPATIENT
Start: 2023-07-27 | End: 2023-10-25

## 2023-07-27 RX ORDER — GABAPENTIN 100 MG/1
200 CAPSULE ORAL 3 TIMES DAILY
Qty: 180 CAPSULE | Refills: 0 | Status: SHIPPED | OUTPATIENT
Start: 2023-07-27

## 2023-07-27 RX ORDER — NORTRIPTYLINE HYDROCHLORIDE 50 MG/1
100 CAPSULE ORAL
Qty: 60 CAPSULE | Refills: 0 | Status: SHIPPED | OUTPATIENT
Start: 2023-07-27

## 2023-07-27 RX ORDER — ACETAMINOPHEN 160 MG/5ML
650 SUSPENSION ORAL EVERY 8 HOURS
Status: DISCONTINUED | OUTPATIENT
Start: 2023-07-27 | End: 2023-07-31

## 2023-07-27 RX ORDER — HYDROXYCHLOROQUINE SULFATE 200 MG/1
TABLET, FILM COATED ORAL
Qty: 45 TABLET | Refills: 0 | Status: SHIPPED | OUTPATIENT
Start: 2023-07-27 | End: 2023-08-26

## 2023-07-27 RX ORDER — AMOXICILLIN 250 MG
1 CAPSULE ORAL 2 TIMES DAILY PRN
Qty: 30 TABLET | Refills: 0 | Status: SHIPPED | OUTPATIENT
Start: 2023-07-27

## 2023-07-27 RX ORDER — OXYCODONE HYDROCHLORIDE 5 MG/1
2.5 TABLET ORAL EVERY 6 HOURS PRN
Qty: 20 TABLET | Refills: 0 | Status: SHIPPED | OUTPATIENT
Start: 2023-07-27

## 2023-07-27 RX ORDER — POLYETHYLENE GLYCOL 3350 17 G/17G
17 POWDER, FOR SOLUTION ORAL DAILY
Qty: 30 EACH | Refills: 0 | Status: SHIPPED | OUTPATIENT
Start: 2023-07-27

## 2023-07-27 RX ORDER — MELATONIN 5 MG
5 TABLET,CHEWABLE ORAL
Qty: 30 TABLET | Refills: 0 | Status: SHIPPED | OUTPATIENT
Start: 2023-07-27

## 2023-07-27 RX ORDER — LIDOCAINE 40 MG/G
CREAM TOPICAL AS NEEDED
Qty: 30 G | Refills: 0 | Status: SHIPPED | OUTPATIENT
Start: 2023-07-27

## 2023-07-27 RX ORDER — ROPINIROLE 2 MG/1
2 TABLET, FILM COATED ORAL
Qty: 30 TABLET | Refills: 0 | Status: SHIPPED | OUTPATIENT
Start: 2023-07-27

## 2023-07-27 RX ORDER — FAMOTIDINE 40 MG/5ML
20 POWDER, FOR SUSPENSION ORAL 2 TIMES DAILY
Qty: 150 ML | Refills: 0 | Status: SHIPPED | OUTPATIENT
Start: 2023-07-27

## 2023-07-27 RX ORDER — METHOCARBAMOL 500 MG/1
500 TABLET, FILM COATED ORAL DAILY PRN
Qty: 30 TABLET | Refills: 0 | Status: SHIPPED | OUTPATIENT
Start: 2023-07-27

## 2023-07-27 RX ORDER — FOLIC ACID 1 MG/1
2 TABLET ORAL DAILY
Qty: 60 TABLET | Refills: 0 | Status: SHIPPED | OUTPATIENT
Start: 2023-07-27

## 2023-07-27 NOTE — ASSESSMENT & PLAN NOTE
Noted on CT- worsened from prior exam w/ additional marked compression deformity at L4, severe compression at L5  No surgical intervention at this time per neuro sx  Continue pain control w/ lidocaine cream, ATC tylenol tid, gabapentin tid, robaxin q. 24 PRN and oxycodone 2.5 mg PO q. 6 PRN  Continue PT/OT  Brace deferred d/t recent PEG tube placement  F/u w/ neuro sx as directed

## 2023-07-27 NOTE — ASSESSMENT & PLAN NOTE
Patient w/ hx of generalized chronic pain  Continue pain control w/ ATC tylenol tid, lidocaine cream, robaxin q. 24 PRN, gabapentin tid and oxycodone 2.5 mg q. 6 PRN

## 2023-07-27 NOTE — ASSESSMENT & PLAN NOTE
Patient has generalized chronic pain  Continue plaquenil 200 mg in the AM and 100 mg in the PM  Continue methotrexate q.  Thursday

## 2023-07-27 NOTE — ASSESSMENT & PLAN NOTE
Multifactorial- in setting of electrolyte abnormalities, pyelonephritis, bacteremia and dysphagia  Maintain fall precautions  Continue PT/OT  Ensure adequate nutrition and hydration   for dispo planning  Patient will be returning home w/ family support

## 2023-07-27 NOTE — PROGRESS NOTES
[address]  [phone]  DISCHARGE SUMMARY  FACILITY: [facility]     Code 31    ADDENDUM: PATIENT WILL NOT BE DISCHARGING D/T TUBE FEEDS NOT BEING SET UP YET. SENIOR CARE SERVICES WILL CONTINUE TO FOLLOW. NAME: Marisel Rivera  AGE: 77 y.o. SEX: female   CODE STATUS: No CPR    DATE OF ADMISSION: 7/8/2023   DATE OF DISCHARGE: 7/28/2023   DISCHARGE DISPOSITION: Stable for discharge to home with family support and home health PT/OT/SN services. Reason for Admission: Patient was admitted to [facility] for rehabilitation after hospitalization for generalized weakness. Past Medical and Surgical History:   Past Medical History:   Diagnosis Date   • Acute blood loss anemia 9/9/2020   • Aftercare following joint replacement 9/9/2020    Patient had right reversed total shoulder arthroplasty.  Pain was controlled when he left the hospital.     • Anxiety    • Arthritis    • Cellulitis of left lower extremity 11/30/2019   • Depression    • Disease of thyroid gland     HYPO   • Dyspepsia 11/12/2019   • Dysphagia 2/6/2023   • Femur neck fracture (HCC)    • GERD (gastroesophageal reflux disease)    • Hyperthyroidism     RESOLVED: [id number]   • Hyponatremia 7/25/2022   • Leg pain 2/6/2023   • Osteoporosis    • Polio    • PVD (peripheral vascular disease) ([address])    • Right BBB/left ant fasc block    • Shoulder pain, bilateral 7/27/2021   • UTI (urinary tract infection)    • Varicella infection     LAST ASSESSED: [id number]      Past Surgical History:   Procedure Laterality Date   • APPENDECTOMY     • HIP ARTHROPLASTY Left 11/27/2017    Procedure: REMOVAL IM NAIL CONVERSION TO TOTAL HIP ARTHROPLASTY POSTERIOR APPROACH;  Surgeon: Gali David MD;  Location: [facility];  Service: Orthopedics   • HIP FRACTURE SURGERY     • HIP SURGERY      both hips replaced;2013 left ,2014 right   • JOINT REPLACEMENT Right     HIP TOTAL   • NV ARTHROPLASTY GLENOHUMERAL JOINT TOTAL SHOULDER Right 9/2/2020    Procedure: ARTHROPLASTY SHOULDER REVERSE;  Surgeon: Darlene Livingston MD;  [facility];  Service: Orthopedics   • UT ARTHROPLASTY GLENOHUMERAL JOINT TOTAL SHOULDER Left 6/1/2021    Procedure: ARTHROPLASTY SHOULDER REVERSE;  Surgeon: Darlene Livingston MD;  [facility];  Service: Orthopedics   • UT CONV PREV HIP TOT HIP ARTHRP W/WO AGRFT/ALGRFT Left 10/4/2017    Procedure: Elisabeth Keating removal of left hip;  Surgeon: Shaji Cerda MD;  Location[facility];  Service: Orthopedics   • UT [address] WRST SURG W/RLS TRANSVRS CARPL LIGM Right 5/24/2022    Procedure: RELEASE CARPAL TUNNEL ENDOSCOPIC-right;  Surgeon: Meghana Renae MD;  Location: [facility];  Service: Orthopedics   • REVISION TOTAL HIP ARTHROPLASTY Right    • TOE AMPUTATION Left 7/24/2022    Procedure: 5TH METATARSAL RESECTION WITH BOTTOM FLAP CLOSURE;  Surgeon: Ernestine Mckay DPM;  Location: [facility];  Service: Podiatry   • TONSILLECTOMY         Course of stay:   Prasad Serrano is a 77year old female admitted to [address] [place] [address] for STR following a hospitalization for generalized weakness. She has a PMH including but not limited to dysphagia, RA, lupus, chronic pain and ambulatory dysfunction.     The patient presented to the hospital w/ generalized weakness. Patient had recent hospitalization for generalized weakness and acute cystitis. She was recommended rehab at this time but had refused. During this past hospitalization, patient was noted to have possible pyelonephritis and E. Coli bacteremia. She completed appropriate antibiotic course. During her stay, she was also noted to have significant dysphagia. She was evaluated by GI and underwent an EGD. Patient had a PEG tube placed and was initiated on tube feeds. Patient was also seen by ENT for possible myotomy. Patient recommended to continue follow up as an outpatient. Given compression fracture, patient was evaluated by neurosurgery. Recommended conservative management w/ outpatient f/u. Patient's overall status improved and she was recommended STR by therapy services.     The patient is seen today for anticipated discharge 7/28/23.     The patient was seen and examined at bedside in stable condition. She is alert and oriented x 3. Patient reports that she is feeling well today. She denies any complaints on today's exam. She has chronic pain but is not in any acute distress. Overall, She denies CP/SOB/N/V/D. Denies lightheadedness, dizziness, headaches, vision changes. Patient states they are eating well and staying hydrated. Denies any bladder issues. Reports some diarrhea but states it is only once or twice a day. She is receiving tube feeds. No concerns from nursing staff. Patient is looking forward to returning home. Family has been taught by nursing staff how to hang tube feeds for patient. She is medically stable for d/c. Recommend f/u w/ PCP.      ROS:  Review of Systems   Constitutional: Negative. Negative for appetite change, chills, fatigue and fever. HENT: Negative. Negative for congestion, facial swelling, rhinorrhea, sneezing and sore throat. Eyes: Negative. Negative for redness, itching and visual disturbance. Respiratory: Negative. Negative for cough, chest tightness, shortness of breath and wheezing. Cardiovascular: Negative for chest pain, palpitations and leg swelling. Gastrointestinal: Negative for abdominal distention, abdominal pain, constipation, nausea and vomiting. Genitourinary: Negative. Negative for difficulty urinating, flank pain, frequency and hematuria. Musculoskeletal: Positive for arthralgias and gait problem. Negative for back pain, myalgias and neck stiffness. Skin: Negative for pallor, rash and wound. Neurological: Positive for weakness. Negative for dizziness, light-headedness, numbness and headaches. Psychiatric/Behavioral: Negative for agitation, confusion and sleep disturbance. The patient is not nervous/anxious. PHYSICAL EXAM:  VITALS:   Vitals:    07/27/23 0934   BP: 120/62   Pulse: 64   Resp: 18   Temp: (!) 97.2 °F (36.2 °C)   SpO2: 97%        Physical Exam  Vitals reviewed. Constitutional:       General: She is not in acute distress. Appearance: Normal appearance. She is not ill-appearing, toxic-appearing or diaphoretic. HENT:      Head: Normocephalic and atraumatic. Right Ear: External ear normal.      Left Ear: External ear normal.      Nose: Nose normal. No congestion or rhinorrhea. Mouth/Throat:      Mouth: Mucous membranes are moist.      Pharynx: Oropharynx is clear. No oropharyngeal exudate or posterior oropharyngeal erythema. Eyes:      General:         Right eye: No discharge. Left eye: No discharge. Extraocular Movements: Extraocular movements intact. Conjunctiva/sclera: Conjunctivae normal.      Pupils: Pupils are equal, round, and reactive to light. Cardiovascular:      Rate and Rhythm: Normal rate and regular rhythm. Pulses: Normal pulses. Heart sounds: Normal heart sounds. No murmur heard. No friction rub. No gallop. Pulmonary:      Effort: Pulmonary effort is normal. No respiratory distress. Breath sounds: Normal breath sounds. No wheezing. Chest:      Chest wall: No tenderness. Abdominal:      General: Abdomen is flat. Bowel sounds are normal. There is no distension. Palpations: Abdomen is soft. There is no mass. Tenderness: There is no abdominal tenderness. There is no guarding. Musculoskeletal:         General: No swelling or tenderness. Normal range of motion. Cervical back: Normal range of motion and neck supple. No rigidity or tenderness. Right lower leg: No edema. Left lower leg: No edema. Skin:     General: Skin is warm and dry. Coloration: Skin is not jaundiced. Findings: No bruising, erythema or rash.    Neurological:      General: No focal deficit present. Mental Status: She is alert and oriented to person, place, and time. Mental status is at baseline. Sensory: No sensory deficit. Motor: Weakness present. Coordination: Coordination normal.      Gait: Gait abnormal.   Psychiatric:         Mood and Affect: Mood normal.         Behavior: Behavior normal.         Thought Content: Thought content normal.         Judgment: Judgment normal.         Admission Diagnoses:   1. Pharyngoesophageal dysphagia  Assessment & Plan:  Patient w/ significant dysphagia w/ VBS revealing no swallowing function  S/p PEG tube placement w/ tube feeds  Patient evaluated by ENT for possible myotomy- received preop clearance  Continue ST- trial periods of food  Continue tube feeds- at goal rate of 45 ml/hr  F/u w/ ENT as directed      2. Functional diarrhea  Assessment & Plan:  Resolved  Likely in setting of tube feeds  Monitor clinically      3. Rheumatoid arthritis involving multiple sites with positive rheumatoid factor (HCC)  Assessment & Plan:  Patient has generalized chronic pain  Continue plaquenil 200 mg in the AM and 100 mg in the PM  Continue methotrexate q. Thursday    Orders:  -     hydroxychloroquine (PLAQUENIL) 200 mg tablet; 1 tablet (200 mg total) by Per PEG Tube route every morning AND 0.5 tablets (100 mg total) daily at bedtime. 1 tab in am 1/2 tab in pm.  -     Methotrexate 2.5 MG/ML SOLN; 3 mL by Per PEG Tube route every 7 days    4. Closed compression fracture of L5 vertebra, sequela  Assessment & Plan:  Noted on CT- worsened from prior exam w/ additional marked compression deformity at L4, severe compression at L5  No surgical intervention at this time per neuro sx  Continue pain control w/ lidocaine cream, ATC tylenol tid, gabapentin tid, robaxin q. 24 PRN and oxycodone 2.5 mg PO q. 6 PRN  Continue PT/OT  Brace deferred d/t recent PEG tube placement  F/u w/ neuro sx as directed      5.  Chronic pain syndrome  Assessment & Plan:  Patient w/ hx of generalized chronic pain  Continue pain control w/ ATC tylenol tid, lidocaine cream, robaxin q. 24 PRN, gabapentin tid and oxycodone 2.5 mg q. 6 PRN    Orders:  -     acetaminophen (TYLENOL) oral liquid 650 mg  -     levothyroxine 75 mcg tablet; 1 tablet (75 mcg total) by Per G Tube route daily in the early morning  -     oxyCODONE (Roxicodone) 5 immediate release tablet; 0.5 tablets (2.5 mg total) by Per PEG Tube route every 6 (six) hours as needed for moderate pain Max Daily Amount: 10 mg    6. Generalized weakness  Assessment & Plan:  Multifactorial- in setting of electrolyte abnormalities, pyelonephritis, bacteremia and dysphagia  Maintain fall precautions  Continue PT/OT  Ensure adequate nutrition and hydration   for dispo planning  Patient will be returning home w/ family support        7. Acute midline low back pain with right-sided sciatica  -     Cholecalciferol 50 MCG (2000 UT) TBDP; 1 tablet (2,000 Units total) by Per PEG Tube route daily  -     gabapentin (NEURONTIN) 100 mg capsule; 2 capsules (200 mg total) by Per PEG Tube route 3 (three) times a day  -     methocarbamol (ROBAXIN) 500 mg tablet; 1 tablet (500 mg total) by Per G Tube route daily as needed for muscle spasms  -     lidocaine (LMX) 4 % cream; Apply topically as needed for mild pain    8. Restless legs syndrome  -     rOPINIRole (REQUIP) 2 mg tablet; 1 tablet (2 mg total) by Per G Tube route daily at bedtime    9. Hypothyroidism due to Hashimoto's thyroiditis  -     levothyroxine 75 mcg tablet; 1 tablet (75 mcg total) by Per G Tube route daily in the early morning    10. Other osteomyelitis of left foot ([address])  -     senna-docusate sodium (SENOKOT S) 8.6-50 mg per tablet; 1 tablet by Per G Tube route 2 (two) times a day as needed for constipation    11. Reflux laryngitis  -     famotidine (PEPCID) 20 mg/2.5 mL oral suspension; 2.5 mL (20 mg total) by Per G Tube route 2 (two) times a day Before breakfast and at bedtime    12. Vitamin deficiency  -     folic acid (FOLVITE) 1 mg tablet; 2 tablets (2 mg total) by Per PEG Tube route daily    13. Constipation, unspecified constipation type  -     polyethylene glycol (MIRALAX) 17 g packet; 17 g by Per PEG Tube route daily    14. Depression, unspecified depression type  -     nortriptyline (PAMELOR) 50 mg capsule; 2 capsules (100 mg total) by Per PEG Tube route daily at bedtime    15. Insomnia, unspecified type  -     Melatonin 5 MG CHEW; 5 mg by Per PEG Tube route daily at bedtime       Follow-up Recommendations:    • Outpatient Follow up with PCP in the next 2 weeks  • Home Health PT/OT/SN services    Labs and testing performed during stay:    HGB 10.6  WBC 7.0  PLTS 303    BUN 17  CREAT 0.6    K 4.8    Discharge Medications: See discharge medication list which was reviewed and signed. Current Outpatient Medications:   •  acetaminophen (TYLENOL) 650 mg CR tablet, Take 2 tablets (1,300 mg total) by mouth 3 (three) times a day, Disp: 30 tablet, Rfl: 0  •  Cholecalciferol 50 MCG (2000 UT) TBDP, Take 1 tablet (2,000 Units total) by mouth daily, Disp: 90 tablet, Rfl: 1  •  famotidine (PEPCID) 20 mg/2.5 mL oral suspension, Take 2.5 mL (20 mg total) by mouth 2 (two) times a day Before breakfast and at bedtime, Disp: 100 mL, Rfl: 11  •  folic acid (FOLVITE) 1 mg tablet, Take 2 mg by mouth daily, Disp: , Rfl:   •  gabapentin (NEURONTIN) 100 mg capsule, Take 2 capsules (200 mg total) by mouth 3 (three) times a day, Disp: 90 capsule, Rfl: 1  •  hydroxychloroquine (PLAQUENIL) 200 mg tablet, Take 1 tablet (200 mg total) by mouth every morning AND 0.5 tablets (100 mg total) daily at bedtime.  1 tab in am 1/2 tab in pm., Disp: 135 tablet, Rfl: 1  •  levothyroxine 75 mcg tablet, Take 1 tablet (75 mcg total) by mouth daily in the early morning, Disp: 90 tablet, Rfl: 1  •  lidocaine (LMX) 4 % cream, Apply topically as needed for mild pain, Disp: 30 g, Rfl: 0  •  melatonin 3 mg, Take 1 tablet (3 mg total) by mouth daily at bedtime, Disp: 30 tablet, Rfl: 0  •  methocarbamol (ROBAXIN) 500 mg tablet, Take 1 tablet (500 mg total) by mouth daily as needed for muscle spasms, Disp: 30 tablet, Rfl: 3  •  methotrexate 2.5 MG tablet, 3 tablets by mouth once a week on Thursday, Disp:  , Rfl:   •  Multiple Vitamins-Minerals (CENTRUM SILVER PO), Take 1 tablet by mouth daily, Disp: , Rfl:   •  nortriptyline (PAMELOR) 50 mg capsule, Take 2 capsules (100 mg total) by mouth daily at bedtime, Disp: 60 capsule, Rfl: 2  •  polyethylene glycol (MIRALAX) 17 g packet, Take 17 g by mouth daily Do not start before April 18, 2023., Disp: , Rfl: 0  •  rOPINIRole (REQUIP) 2 mg tablet, Take 1 tablet (2 mg total) by mouth daily at bedtime, Disp: 90 tablet, Rfl: 1  •  senna-docusate sodium (SENOKOT S) 8.6-50 mg per tablet, Take 1 tablet by mouth 2 (two) times a day, Disp: 20 tablet, Rfl: 0     Discussion with patient/family and further instructions:  -Fall precautions  -Aspiration precautions  -Bleeding precautions  -Monitor for signs/symptoms of infection  -Medication list was reviewed and signed  -DME form was completed    Status at time of discharge: Stable    Billing based on time. Time spent on unit, 40 minutes. Time spent counseling pt on debility/condition, 30 minutes. Please note:  Voice-recognition software may have been used in the preparation of this document. Occasional wrong word or "sound-alike" substitutions may have occurred due to the inherent limitations of voice recognition software. Interpretation should be guided by context.         CONNIE Pak  7/27/2023

## 2023-07-31 ENCOUNTER — PATIENT OUTREACH (OUTPATIENT)
Dept: CASE MANAGEMENT | Facility: OTHER | Age: 78
End: 2023-07-31

## 2023-07-31 ENCOUNTER — HOME HEALTH ADMISSION (OUTPATIENT)
Dept: HOME HEALTH SERVICES | Facility: HOME HEALTHCARE | Age: 78
End: 2023-07-31
Payer: MEDICARE

## 2023-07-31 ENCOUNTER — NURSING HOME VISIT (OUTPATIENT)
Dept: GERIATRICS | Facility: OTHER | Age: 78
End: 2023-07-31
Payer: MEDICARE

## 2023-07-31 VITALS
WEIGHT: 89 LBS | RESPIRATION RATE: 18 BRPM | TEMPERATURE: 97.2 F | SYSTOLIC BLOOD PRESSURE: 120 MMHG | DIASTOLIC BLOOD PRESSURE: 62 MMHG | HEART RATE: 64 BPM | OXYGEN SATURATION: 97 % | BODY MASS INDEX: 19.26 KG/M2

## 2023-07-31 DIAGNOSIS — E03.8 HYPOTHYROIDISM DUE TO HASHIMOTO'S THYROIDITIS: ICD-10-CM

## 2023-07-31 DIAGNOSIS — E06.3 HYPOTHYROIDISM DUE TO HASHIMOTO'S THYROIDITIS: ICD-10-CM

## 2023-07-31 DIAGNOSIS — M05.79 RHEUMATOID ARTHRITIS INVOLVING MULTIPLE SITES WITH POSITIVE RHEUMATOID FACTOR (HCC): Chronic | ICD-10-CM

## 2023-07-31 DIAGNOSIS — R13.14 PHARYNGOESOPHAGEAL DYSPHAGIA: Primary | ICD-10-CM

## 2023-07-31 DIAGNOSIS — S32.050S CLOSED COMPRESSION FRACTURE OF L5 VERTEBRA, SEQUELA: ICD-10-CM

## 2023-07-31 DIAGNOSIS — F32.A ANXIETY AND DEPRESSION: ICD-10-CM

## 2023-07-31 DIAGNOSIS — F41.9 ANXIETY AND DEPRESSION: ICD-10-CM

## 2023-07-31 DIAGNOSIS — R26.2 AMBULATORY DYSFUNCTION: ICD-10-CM

## 2023-07-31 PROBLEM — N39.0 URINARY TRACT INFECTION WITHOUT HEMATURIA: Status: RESOLVED | Noted: 2017-08-30 | Resolved: 2023-07-31

## 2023-07-31 PROCEDURE — 99316 NF DSCHRG MGMT 30 MIN+: CPT | Performed by: NURSE PRACTITIONER

## 2023-07-31 NOTE — ASSESSMENT & PLAN NOTE
Noted on recent CT with worsening from prior exam-marked compression deformity at L4 with severe compression at L5  Evaluated by neurosurgery no indication for surgical intervention at this time  Continue pain management with lidocaine, around-the-clock Tylenol, gabapentin 3 times daily, Robaxin as needed, oxycodone 2.5 mg p.o. every 6 hours as needed  Patient has met all goals with PT/OT at rehab and will be discharged home with home health PT/OT  Outpatient follow-up with neurosurgery as scheduled

## 2023-07-31 NOTE — PROGRESS NOTES
[address]  [phone]  DISCHARGE SUMMARY  FACILITY: [facility]  Code 31    NAME: Geetha Rivera  AGE: 77 y.o. SEX: female   CODE STATUS: No CPR    DATE OF ADMISSION: 7/8/23   DATE OF DISCHARGE: 8/1/23   DISCHARGE DISPOSITION: Stable for discharge to home with family support and home health PT/OT/SN services. Reason for Admission: Patient was admitted to [address] [place] [address] for rehabilitation after hospitalization for Generalized weakness. Past Medical and Surgical History:   Past Medical History:   Diagnosis Date   • Acute blood loss anemia 9/9/2020   • Aftercare following joint replacement 9/9/2020    Patient had right reversed total shoulder arthroplasty.  Pain was controlled when he left the hospital.     • Anxiety    • Arthritis    • Cellulitis of left lower extremity 11/30/2019   • Depression    • Disease of thyroid gland     HYPO   • Dyspepsia 11/12/2019   • Dysphagia 2/6/2023   • Femur neck fracture (HCC)    • GERD (gastroesophageal reflux disease)    • Hyperthyroidism     RESOLVED: [id number]   • Hyponatremia 7/25/2022   • Leg pain 2/6/2023   • Osteoporosis    • Polio    • PVD (peripheral vascular disease) ([address])    • Right BBB/left ant fasc block    • Shoulder pain, bilateral 7/27/2021   • UTI (urinary tract infection)    • Varicella infection     LAST ASSESSED: [id number]      Past Surgical History:   Procedure Laterality Date   • APPENDECTOMY     • HIP ARTHROPLASTY Left 11/27/2017    Procedure: REMOVAL IM NAIL CONVERSION TO TOTAL HIP ARTHROPLASTY POSTERIOR APPROACH;  Surgeon: Rebecca Melton MD;  [facility];  Service: Orthopedics   • HIP FRACTURE SURGERY     • HIP SURGERY      both hips replaced;2013 left ,2014 right   • JOINT REPLACEMENT Right     HIP TOTAL   • SC ARTHROPLASTY GLENOHUMERAL JOINT TOTAL SHOULDER Right 9/2/2020    Procedure: ARTHROPLASTY SHOULDER REVERSE;  Surgeon: Michael Vargas MD;  [facility];  Service: Orthopedics   • CT ARTHROPLASTY GLENOHUMERAL JOINT TOTAL SHOULDER Left 6/1/2021    Procedure: ARTHROPLASTY SHOULDER REVERSE;  Surgeon: Brissa Hernandez MD;  [facility];  Service: Orthopedics   • CT CONV PREV HIP TOT HIP ARTHRP W/WO AGRFT/ALGRFT Left 10/4/2017    Procedure: Roselind Kacey removal of left hip;  Surgeon: Della Blanchard MD;  [facility];  Service: Orthopedics   • CT [address] WRST SURG W/RLS TRANSVRS CARPL LIGM Right 5/24/2022    Procedure: RELEASE CARPAL TUNNEL ENDOSCOPIC-right;  Surgeon: Jabari Castillo MD;  [facility];  Service: Orthopedics   • REVISION TOTAL HIP ARTHROPLASTY Right    • TOE AMPUTATION Left 7/24/2022    Procedure: 5TH METATARSAL RESECTION WITH BOTTOM FLAP CLOSURE;  Surgeon: Lianne Terrell DPM;  Location: [facility];  Service: Podiatry   • TONSILLECTOMY         Course of stay:   Berta Kunz is a 77 y.o. female admitted to [address] [place] [address] following hospital stay for Generalized weakness. Patient has a past medical Hx including but not limited to Dysphagia, RA, Lupus, Chronic Pain, Ambulatory dysfunction, RLS. Patient is seen in collaboration with nursing for medical mgmt and Discharge visit. Patient initially presented to hospital with generalized weakness and acute cystitis. She was recommended rehab initally but had refused. Patient was noted to have possible pyelonephritis and E. Coli bacteremia. She completed IV antibiotics with improvement. She was also noted with significant dysphagia. She was evaluated by GI and underwent an EGD. Patient had a PEG tube placed and was initiated on tube feeds. Patient was also seen by ENT for possible myotomy. Patient recommended to continue follow up as an outpatient. Imaging also revealed compression fracture, patient was evaluated by neurosurgery. Recommended conservative management w/ outpatient follow up and pain management.  Patient's overall status improved and she was recommended post acute rehab prior to retuning home.        The patient was seen and examined at bedside in stable condition. She is alert and oriented x 3. Patient's daughter is at bedside, all questions answered. Patient states she is feeling well. She offers no complaints, states she is eager to go home. She is tolerating her tube feeds. She denies any bowel or bladder issues except for some loose stools due to tube feeds, denies diarrhea. She denies CP/SOB/N/V/D. Denies lightheadedness, dizziness, headaches, vision changes. Family has been taught by nursing staff how to hang tube feeds for patient. She is medically stable for d/c. Recommend f/u w/ PCP.       ROS:  Review of Systems   Musculoskeletal: Positive for arthralgias and gait problem. Neurological: Positive for weakness. All other systems reviewed and are negative. PHYSICAL EXAM:  VITALS:   Vitals:    07/31/23 1130   BP: 120/62   Pulse: 64   Resp: 18   Temp: (!) 97.2 °F (36.2 °C)   SpO2: 97%        Physical Exam  Vitals and nursing note reviewed. Constitutional:       General: She is not in acute distress. Appearance: Normal appearance. HENT:      Head: Normocephalic and atraumatic. Nose: No congestion or rhinorrhea. Mouth/Throat:      Mouth: Mucous membranes are moist.   Eyes:      General: No scleral icterus. Conjunctiva/sclera: Conjunctivae normal.      Pupils: Pupils are equal, round, and reactive to light. Cardiovascular:      Rate and Rhythm: Normal rate and regular rhythm. Pulses: Normal pulses. Heart sounds: Normal heart sounds. No murmur heard. Pulmonary:      Effort: Pulmonary effort is normal. No respiratory distress. Breath sounds: Normal breath sounds. No wheezing, rhonchi or rales. Abdominal:      General: Bowel sounds are normal. There is no distension. Palpations: Abdomen is soft. There is no mass. Tenderness: There is no abdominal tenderness. Hernia: No hernia is present. Musculoskeletal:         General: No swelling or tenderness. Lymphadenopathy:      Cervical: No cervical adenopathy. Skin:     General: Skin is warm and dry. Coloration: Skin is not pale. Findings: No rash. Neurological:      General: No focal deficit present. Mental Status: She is alert and oriented to person, place, and time. Mental status is at baseline. Motor: Weakness present. Gait: Gait abnormal.   Psychiatric:         Mood and Affect: Mood normal.         Behavior: Behavior normal.         Admission Diagnoses:   1. Pharyngoesophageal dysphagia  Assessment & Plan:  · Recent video barium swallow revealing no swallow function  · S/p PEG tube placement  · Continue tube feeds  · Outpatient follow-up with ENT as directed      2. Hypothyroidism due to Hashimoto's thyroiditis  Assessment & Plan:  Last TSH stable  Continue levothyroxine 75 mcg daily      3. Closed compression fracture of L5 vertebra, sequela  Assessment & Plan:  Noted on recent CT with worsening from prior exam-marked compression deformity at L4 with severe compression at L5  Evaluated by neurosurgery no indication for surgical intervention at this time  Continue pain management with lidocaine, around-the-clock Tylenol, gabapentin 3 times daily, Robaxin as needed, oxycodone 2.5 mg p.o. every 6 hours as needed  Patient has met all goals with PT/OT at rehab and will be discharged home with home health PT/OT  Outpatient follow-up with neurosurgery as scheduled      4. Rheumatoid arthritis involving multiple sites with positive rheumatoid factor ([address])  Assessment & Plan: With chronic pain due to rheumatoid arthritis of multiple sites  Continue Plaquenil 200 mg in the morning and 100 mg in the evening  Continue methotrexate every Thursday      5.  Ambulatory dysfunction  Assessment & Plan:  • Multifactorial  • Continue PT/OT  • Fall Precautions  • Ensure adequate nutrition/hydration   •  following for d/c planning - will d/c home with home health PT/OT/SN services with [facility]        6. Anxiety and depression  Assessment & Plan:  · Mood stable on exam   · Continue Pamelor 100 mg Q HS  · OP f/u with psychiatry          Follow-up Recommendations:    • Outpatient Follow up with PCP in the next 2 weeks  • Home Health PT/OT/SN services     Labs and testing performed during stay:    Reviewed in chart    Discharge Medications: See discharge medication list which was reviewed and signed. All Scripts sent to [address] [place] [address] on 7/27/23      Current Outpatient Medications:   •  acetaminophen (TYLENOL) 650 mg CR tablet, Take 2 tablets (1,300 mg total) by mouth 3 (three) times a day, Disp: 30 tablet, Rfl: 0  •  Cholecalciferol 50 MCG (2000 UT) TBDP, 1 tablet (2,000 Units total) by Per PEG Tube route daily, Disp: 30 tablet, Rfl: 0  •  famotidine (PEPCID) 20 mg/2.5 mL oral suspension, 2.5 mL (20 mg total) by Per G Tube route 2 (two) times a day Before breakfast and at bedtime, Disp: 150 mL, Rfl: 0  •  folic acid (FOLVITE) 1 mg tablet, 2 tablets (2 mg total) by Per PEG Tube route daily, Disp: 60 tablet, Rfl: 0  •  gabapentin (NEURONTIN) 100 mg capsule, 2 capsules (200 mg total) by Per PEG Tube route 3 (three) times a day, Disp: 180 capsule, Rfl: 0  •  hydroxychloroquine (PLAQUENIL) 200 mg tablet, 1 tablet (200 mg total) by Per PEG Tube route every morning AND 0.5 tablets (100 mg total) daily at bedtime.  1 tab in am 1/2 tab in pm., Disp: 45 tablet, Rfl: 0  •  levothyroxine 75 mcg tablet, 1 tablet (75 mcg total) by Per G Tube route daily in the early morning, Disp: 30 tablet, Rfl: 0  •  lidocaine (LMX) 4 % cream, Apply topically as needed for mild pain, Disp: 30 g, Rfl: 0  •  melatonin 3 mg, Take 1 tablet (3 mg total) by mouth daily at bedtime, Disp: 30 tablet, Rfl: 0  •  Melatonin 5 MG CHEW, 5 mg by Per PEG Tube route daily at bedtime, Disp: 30 tablet, Rfl: 0  •  methocarbamol (ROBAXIN) 500 mg tablet, 1 tablet (500 mg total) by Per G Tube route daily as needed for muscle spasms, Disp: 30 tablet, Rfl: 0  •  Methotrexate 2.5 MG/ML SOLN, 3 mL by Per PEG Tube route every 7 days, Disp: 60 mL, Rfl: 0  •  Multiple Vitamins-Minerals (CENTRUM SILVER PO), Take 1 tablet by mouth daily, Disp: , Rfl:   •  nortriptyline (PAMELOR) 50 mg capsule, 2 capsules (100 mg total) by Per PEG Tube route daily at bedtime, Disp: 60 capsule, Rfl: 0  •  oxyCODONE (Roxicodone) 5 immediate release tablet, 0.5 tablets (2.5 mg total) by Per PEG Tube route every 6 (six) hours as needed for moderate pain Max Daily Amount: 10 mg, Disp: 20 tablet, Rfl: 0  •  polyethylene glycol (MIRALAX) 17 g packet, 17 g by Per PEG Tube route daily, Disp: 30 each, Rfl: 0  •  rOPINIRole (REQUIP) 2 mg tablet, 1 tablet (2 mg total) by Per G Tube route daily at bedtime, Disp: 30 tablet, Rfl: 0  •  senna-docusate sodium (SENOKOT S) 8.6-50 mg per tablet, 1 tablet by Per G Tube route 2 (two) times a day as needed for constipation, Disp: 30 tablet, Rfl: 0  No current facility-administered medications for this visit. Discussion with patient/family and further instructions:  -Fall precautions  -Aspiration precautions  -Bleeding precautions  -Monitor for signs/symptoms of infection  -Medication list was reviewed and updated    Status at time of discharge: Stable      Billing based on time. Time spent on unit, 40 minutes. Time spent counseling pt on debility/condition, 30 minutes. Please note:  Voice-recognition software may have been used in the preparation of this document. Occasional wrong word or "sound-alike" substitutions may have occurred due to the inherent limitations of voice recognition software. Interpretation should be guided by context.         Tiny Rinne, CRNP  7/31/2023

## 2023-07-31 NOTE — ASSESSMENT & PLAN NOTE
· Recent video barium swallow revealing no swallow function  · S/p PEG tube placement  · Continue tube feeds  · Outpatient follow-up with ENT as directed

## 2023-07-31 NOTE — ASSESSMENT & PLAN NOTE
• Multifactorial  • Continue PT/OT  • Fall Precautions  • Ensure adequate nutrition/hydration   •  following for d/c planning - will d/c home with home health PT/OT/SN services with [facility]

## 2023-07-31 NOTE — ASSESSMENT & PLAN NOTE
With chronic pain due to rheumatoid arthritis of multiple sites  Continue Plaquenil 200 mg in the morning and 100 mg in the evening  Continue methotrexate every Thursday

## 2023-07-31 NOTE — PROGRESS NOTES
Chart review completed. Email sent to [address] [place] [address] SNF Coordinator to obtain an update on patient. This Admin Coordinator will continue to monitor via chart review throughout episode.

## 2023-08-01 ENCOUNTER — PATIENT OUTREACH (OUTPATIENT)
Dept: CASE MANAGEMENT | Facility: OTHER | Age: 78
End: 2023-08-01

## 2023-08-01 DIAGNOSIS — E87.1 HYPONATREMIA: Primary | ICD-10-CM

## 2023-08-01 PROBLEM — N30.01 ACUTE CYSTITIS WITH HEMATURIA: Status: RESOLVED | Noted: 2023-06-01 | Resolved: 2023-08-01

## 2023-08-01 NOTE — PROGRESS NOTES
Update received the patient discharged today 8/1/23 to Home. Discharged instructions were already scanned into the chart today. I have removed myself off of the care team, added the CM to the care team who will follow the patient through the episode, sent the care manager an inbasket notifying them of the HRR Referal.  Ambulatory referral placed for complex care management. Discharged paperwork attached to this encounter.

## 2023-08-02 ENCOUNTER — HOME CARE VISIT (OUTPATIENT)
Dept: HOME HEALTH SERVICES | Facility: HOME HEALTHCARE | Age: 78
End: 2023-08-02
Payer: MEDICARE

## 2023-08-02 ENCOUNTER — PATIENT OUTREACH (OUTPATIENT)
Dept: FAMILY MEDICINE CLINIC | Facility: CLINIC | Age: 78
End: 2023-08-02

## 2023-08-02 VITALS
HEART RATE: 81 BPM | SYSTOLIC BLOOD PRESSURE: 90 MMHG | OXYGEN SATURATION: 98 % | DIASTOLIC BLOOD PRESSURE: 60 MMHG | RESPIRATION RATE: 16 BRPM | TEMPERATURE: 97.6 F

## 2023-08-02 PROCEDURE — 10330081 VN NO-PAY CLAIM PROCEDURE

## 2023-08-02 PROCEDURE — 400013 VN SOC

## 2023-08-02 PROCEDURE — G0299 HHS/HOSPICE OF RN EA 15 MIN: HCPCS

## 2023-08-02 NOTE — PROGRESS NOTES
Wilian Baer returned my call. States Angela Wharton had diarrhea since coming home. she thinks it might be from delay in her tube feeding being restarted once she got home. no call yet from home health. I will check into this. Spoke with Loren PRADO they will out tonight at 6pm to start care. Spoke with kai to let her know of [facility] appointment.  She declines further outreach at this time

## 2023-08-03 ENCOUNTER — HOME CARE VISIT (OUTPATIENT)
Dept: HOME HEALTH SERVICES | Facility: HOME HEALTHCARE | Age: 78
End: 2023-08-03
Payer: MEDICARE

## 2023-08-03 VITALS
TEMPERATURE: 97.5 F | SYSTOLIC BLOOD PRESSURE: 100 MMHG | OXYGEN SATURATION: 96 % | DIASTOLIC BLOOD PRESSURE: 54 MMHG | HEART RATE: 78 BPM | RESPIRATION RATE: 18 BRPM

## 2023-08-03 PROCEDURE — G0152 HHCP-SERV OF OT,EA 15 MIN: HCPCS

## 2023-08-03 PROCEDURE — G0299 HHS/HOSPICE OF RN EA 15 MIN: HCPCS

## 2023-08-04 ENCOUNTER — TELEMEDICINE (OUTPATIENT)
Dept: FAMILY MEDICINE CLINIC | Facility: CLINIC | Age: 78
End: 2023-08-04
Payer: MEDICARE

## 2023-08-04 ENCOUNTER — HOME CARE VISIT (OUTPATIENT)
Dept: HOME HEALTH SERVICES | Facility: HOME HEALTHCARE | Age: 78
End: 2023-08-04
Payer: MEDICARE

## 2023-08-04 VITALS — HEIGHT: 57 IN | WEIGHT: 90 LBS | BODY MASS INDEX: 19.41 KG/M2

## 2023-08-04 VITALS — RESPIRATION RATE: 20 BRPM | DIASTOLIC BLOOD PRESSURE: 70 MMHG | SYSTOLIC BLOOD PRESSURE: 120 MMHG

## 2023-08-04 DIAGNOSIS — F41.9 ANXIETY AND DEPRESSION: ICD-10-CM

## 2023-08-04 DIAGNOSIS — Z13.1 SCREENING FOR DIABETES MELLITUS (DM): ICD-10-CM

## 2023-08-04 DIAGNOSIS — F32.A ANXIETY AND DEPRESSION: ICD-10-CM

## 2023-08-04 DIAGNOSIS — Z79.899 IMMUNODEFICIENCY DUE TO DRUGS (HCC): ICD-10-CM

## 2023-08-04 DIAGNOSIS — M54.41 ACUTE MIDLINE LOW BACK PAIN WITH RIGHT-SIDED SCIATICA: ICD-10-CM

## 2023-08-04 DIAGNOSIS — D84.821 IMMUNODEFICIENCY DUE TO DRUGS (HCC): ICD-10-CM

## 2023-08-04 DIAGNOSIS — E06.3 HYPOTHYROIDISM DUE TO HASHIMOTO'S THYROIDITIS: ICD-10-CM

## 2023-08-04 DIAGNOSIS — E55.9 VITAMIN D DEFICIENCY: Chronic | ICD-10-CM

## 2023-08-04 DIAGNOSIS — M32.19 OTHER SYSTEMIC LUPUS ERYTHEMATOSUS WITH OTHER ORGAN INVOLVEMENT (HCC): ICD-10-CM

## 2023-08-04 DIAGNOSIS — Z09 HOSPITAL DISCHARGE FOLLOW-UP: Primary | ICD-10-CM

## 2023-08-04 DIAGNOSIS — E03.8 HYPOTHYROIDISM DUE TO HASHIMOTO'S THYROIDITIS: ICD-10-CM

## 2023-08-04 DIAGNOSIS — E44.0 MODERATE PROTEIN-CALORIE MALNUTRITION (HCC): ICD-10-CM

## 2023-08-04 DIAGNOSIS — R13.14 PHARYNGOESOPHAGEAL DYSPHAGIA: ICD-10-CM

## 2023-08-04 DIAGNOSIS — M80.00XD AGE-RELATED OSTEOPOROSIS WITH CURRENT PATHOLOGICAL FRACTURE WITH ROUTINE HEALING: ICD-10-CM

## 2023-08-04 DIAGNOSIS — D64.9 ANEMIA, UNSPECIFIED TYPE: ICD-10-CM

## 2023-08-04 PROBLEM — E46 PROTEIN CALORIE MALNUTRITION (HCC): Status: RESOLVED | Noted: 2023-05-04 | Resolved: 2023-08-04

## 2023-08-04 PROBLEM — E83.42 HYPOMAGNESEMIA: Status: RESOLVED | Noted: 2023-06-02 | Resolved: 2023-08-04

## 2023-08-04 PROBLEM — L97.426 DIABETIC ULCER OF LEFT MIDFOOT ASSOCIATED WITH TYPE 2 DIABETES MELLITUS, WITH BONE INVOLVEMENT WITHOUT EVIDENCE OF NECROSIS (HCC): Status: RESOLVED | Noted: 2023-05-17 | Resolved: 2023-08-04

## 2023-08-04 PROBLEM — E83.52 HYPERCALCEMIA: Status: RESOLVED | Noted: 2023-04-19 | Resolved: 2023-08-04

## 2023-08-04 PROBLEM — E87.6 HYPOKALEMIA: Status: RESOLVED | Noted: 2023-06-25 | Resolved: 2023-08-04

## 2023-08-04 PROBLEM — E32.8: Status: RESOLVED | Noted: 2023-05-17 | Resolved: 2023-08-04

## 2023-08-04 PROBLEM — N12 PYELONEPHRITIS: Status: RESOLVED | Noted: 2023-06-25 | Resolved: 2023-08-04

## 2023-08-04 PROBLEM — S46.112A RUPTURE LONG HEAD BICEPS TENDON, LEFT, INITIAL ENCOUNTER: Status: RESOLVED | Noted: 2021-05-13 | Resolved: 2023-08-04

## 2023-08-04 PROBLEM — R33.9 URINARY RETENTION: Status: RESOLVED | Noted: 2023-06-01 | Resolved: 2023-08-04

## 2023-08-04 PROBLEM — G93.40 ACUTE ENCEPHALOPATHY: Status: RESOLVED | Noted: 2023-04-19 | Resolved: 2023-08-04

## 2023-08-04 PROBLEM — E11.621 DIABETIC ULCER OF LEFT MIDFOOT ASSOCIATED WITH TYPE 2 DIABETES MELLITUS, WITH BONE INVOLVEMENT WITHOUT EVIDENCE OF NECROSIS (HCC): Status: RESOLVED | Noted: 2023-05-17 | Resolved: 2023-08-04

## 2023-08-04 PROBLEM — S40.021A ARM BRUISE, RIGHT, INITIAL ENCOUNTER: Status: RESOLVED | Noted: 2023-03-14 | Resolved: 2023-08-04

## 2023-08-04 PROBLEM — E87.1 HYPONATREMIA: Status: RESOLVED | Noted: 2022-07-25 | Resolved: 2023-08-04

## 2023-08-04 PROBLEM — R11.0 NAUSEA: Status: RESOLVED | Noted: 2023-06-25 | Resolved: 2023-08-04

## 2023-08-04 PROBLEM — R63.4 WEIGHT LOSS: Status: RESOLVED | Noted: 2023-04-25 | Resolved: 2023-08-04

## 2023-08-04 PROBLEM — K59.1 FUNCTIONAL DIARRHEA: Status: RESOLVED | Noted: 2023-07-24 | Resolved: 2023-08-04

## 2023-08-04 PROBLEM — S06.33AA CEREBRAL CONTUSION (HCC): Status: RESOLVED | Noted: 2023-02-19 | Resolved: 2023-08-04

## 2023-08-04 PROCEDURE — G0151 HHCP-SERV OF PT,EA 15 MIN: HCPCS

## 2023-08-04 PROCEDURE — 99496 TRANSJ CARE MGMT HIGH F2F 7D: CPT | Performed by: FAMILY MEDICINE

## 2023-08-04 NOTE — ASSESSMENT & PLAN NOTE
Related to multiple compression fractures. Neurosurgery recommended med mgmt. She is well controlled on oxycodone 2.5 bid and accepts the risks. She prioritizes quality of life over prolongation. She also would like a refil of lorazepam but we discussed the risks of respiratory depression and advised against the combination.

## 2023-08-04 NOTE — PROGRESS NOTES
Virtual TCM Visit:    Verification of patient location:    Patient is located at Home in the following state in which I hold an active license [place]    Assessment/Plan:        Problem List Items Addressed This Visit        Digestive    Dysphagia     Had a bariaum swallow study that showed severe oropharyngeal dysphagia. Goes to ENT for a possible myotomy. Speech pathology recommended NPO. Endocrine    Hypothyroidism due to Hashimoto's thyroiditis     TSH stable June. Continue levothyroxine 75. Recheck next visit. Relevant Orders    TSH, 3rd generation with Free T4 reflex       Musculoskeletal and Integument    Age-related osteoporosis with current pathological fracture with routine healing     Restart prolia. Calcium is wnl. Recheck vit d with next labs. Relevant Orders    Vitamin D 25 hydroxy       Other    Vitamin D deficiency (Chronic)    Relevant Orders    Vitamin D 25 hydroxy    Hospital discharge follow-up - Primary (Chronic)     Notes reviewed and meds reconciled. Other forms of systemic lupus erythematosus ([address])     Managed by rheum. Anxiety and depression     Continue pamelor 100. Pt does not want to stop this or try an alternative. Anemia     Recheck. Relevant Orders    CBC and differential    Acute low back pain     Related to multiple compression fractures. Neurosurgery recommended med mgmt. She is well controlled on oxycodone 2.5 bid and accepts the risks. She prioritizes quality of life over prolongation. She also would like a refil of lorazepam but we discussed the risks of respiratory depression and advised against the combination. Immunodeficiency due to drugs ([address])     On methotrexate and plaquenil. Goes to rheumatology. Moderate protein-calorie malnutrition ([address])     Malnutrition Findings:          Continue tube feeds. BMI Findings: Body mass index is 19.48 kg/m².            Other Visit Diagnoses     Screening for diabetes mellitus (DM)        Relevant Orders    Comprehensive metabolic panel           Reason for visit is tcm    Encounter provider Yokasta Manning MD     Provider located at [address] [place] [id number]    Recent Visits  No visits were found meeting these conditions. Showing recent visits within past 7 days and meeting all other requirements  Today's Visits  Date Type Provider Dept   08/04/23 [facility], [facility] today's visits and meeting all other requirements  Future Appointments  No visits were found meeting these conditions. Showing future appointments within next 150 days and meeting all other requirements       After connecting through [employer], the patient was identified by name and date of birth. Rafaela Rivera was informed that this is a telemedicine visit and that the visit is being conducted through the [facility] 22 Now platform. She agrees to proceed. .  My office door was closed. No one else was in the room. She acknowledged consent and understanding of privacy and security of the video platform. The patient has agreed to participate and understands they can discontinue the visit at any time. Patient is aware this is a billable service. Transitional Care Management Review:  Virginia Kawasaki is a 77 y.o. female here for TCM follow up.      During the TCM phone call patient stated:    TCM Call     Date and time call was made  6/5/2023  2:07 PM    Hospital care reviewed  Records reviewed    Patient was hospitial[facility] at  [facility]    Date of Admission  06/01/23    Date of discharge  06/03/23    Diagnosis  Weakness    Disposition  Rehabilitation center  Released to SNF/TCU/SNU Facility    Were the patients medications reviewed and updated  Yes    Current Symptoms  Leg pain - left side  PATIENT HAS A RASH ON HER BACK THAT SHE DEVELOPED [address] SHE SAID IT IS ITCHY SOMETIMES, OTHERWISE FINE. SHE IS TAKING BENADRYL FOR THE ITCHING    Left side leg pain severity  Mild    Leg pain, left side, onset  Ongoing  PT. IS HAVING EDEMA IN LOWER LEFT LEG. SHE STATED IT IS THE SAME AS USUAL      TCM Call     Post hospital issues  Reduced activity    Should patient be enrolled in anticoag monitoring? No    Scheduled for follow up? Patient refused  Pt's niece Rudy Hermosillo says patient will be going to a rehab and they will call when she is discharged. Did you obtain your prescribed medications  Yes    Do you need help managing your prescriptions or medications  Yes    Is transportation to your appointment needed  No    I have advised the patient to call PCP with any new or worsening symptoms  Rhina Amaro MA    Living Arrangements  Family members    Are you recieving any outpatient services  Yes    What type of services  IRON INFUSIONS EVERY 6 MONTHS    Are you recieving home care services  No    Are you using any community resources  No    Have you fallen in the last 12 months  No    Interperter language line needed  No  PT. SPEAKS ENGLISH    Counseling  Patient    Counseling topics  instructions for management; Importance of RX compliance    Comments  opened in error         Subjective: tcm      Patient ID: Demetris Cárdenas is a 77 y.o. female. HPI   The pt was hospitalized 6/25/23- 7/8/23 for generalized weakness with e. Coli bacteremia likely 2/2 pyelonephritis. Due to dysphagia she was evaluated by GI and underwent an EGD. She had a PEG tube plaved and was started on tube feeds. She is now set up with ENT and is awaiting a myotomy. She is undergoing medical management for compression fractures. She has PT/OT at the home 2 times a week and once a week respectively. She was discharged to a inpatient patient rehab and was just released this week on Tuesday.    The patient eats soft foods occasionally but is receiving most of her nutrition through the tube and is being administered all her meds through the tube. She is receiving jevity 1.2. The only addition to her medication regimen is the famotidine and oxycodone. She is receiving 2.5 mg oxycodone bid. She will need to receive these medications from our office in the future. Review of Systems   Constitutional: Negative for fever and unexpected weight change. HENT: Negative for ear pain, sore throat and trouble swallowing. Eyes: Negative for pain and visual disturbance. Respiratory: Negative for cough, chest tightness, shortness of breath and wheezing. Cardiovascular: Negative for chest pain. Gastrointestinal: Negative for abdominal distention, abdominal pain, blood in stool, constipation, diarrhea, nausea and vomiting. Endocrine: Negative for polydipsia and polyuria. Genitourinary: Negative for dysuria and hematuria. Musculoskeletal: Positive for arthralgias and back pain. Negative for myalgias. Skin: Negative for rash. Neurological: Negative for syncope and headaches. Psychiatric/Behavioral: Negative for suicidal ideas. The patient is nervous/anxious. Objective:    Vitals:    08/04/23 1344   Weight: 40.8 kg (90 lb)   Height: 4' 9" (1.448 m)       Physical Exam  Constitutional:       Appearance: She is well-developed. HENT:      Head: Normocephalic and atraumatic. Right Ear: External ear normal.      Left Ear: External ear normal.   Eyes:      General: No scleral icterus. Conjunctiva/sclera: Conjunctivae normal.   Pulmonary:      Effort: Pulmonary effort is normal. No respiratory distress. Musculoskeletal:      Cervical back: Normal range of motion. Neurological:      Mental Status: She is alert and oriented to person, place, and time. Medications have been reviewed by provider in current encounter    I spent 20 minutes with the patient during this visit.     James Berger MD      VIRTUAL VISIT Shai Rivera verbally agrees to participate in [facility]. Pt is aware that [facility] could be limited without vital signs or the ability to perform a full hands-on physical exam. Geetha Rivera understands she or the provider may request at any time to terminate the video visit and request the patient to seek care or treatment in person.

## 2023-08-04 NOTE — ASSESSMENT & PLAN NOTE
Had a bariaum swallow study that showed severe oropharyngeal dysphagia. Goes to ENT for a possible myotomy. Speech pathology recommended NPO.

## 2023-08-04 NOTE — CASE COMMUNICATION
informational only. . no response required    PT evaluation completed this date. POC 1x1wk 2 x3 wks to improve ambulatory abilities and stability household surfaces and distances with least amount of assistance.  ther ex for strengthening to improve hip girdle and quad strength

## 2023-08-07 ENCOUNTER — TELEPHONE (OUTPATIENT)
Dept: FAMILY MEDICINE CLINIC | Facility: CLINIC | Age: 78
End: 2023-08-07

## 2023-08-07 ENCOUNTER — HOME CARE VISIT (OUTPATIENT)
Dept: HOME HEALTH SERVICES | Facility: HOME HEALTHCARE | Age: 78
End: 2023-08-07
Payer: MEDICARE

## 2023-08-07 PROCEDURE — G0152 HHCP-SERV OF OT,EA 15 MIN: HCPCS

## 2023-08-07 NOTE — TELEPHONE ENCOUNTER
Message left on voicemail:     Hi, this is Jorge Luis Rivera from the [facility] at [employer]. I'm calling in regards to doctor Julio's patient. Dhara vergara. Issa's last name is COLON. First name is Janina. YOB: 1945. I just want to inform the doctor that the patient fell today. The niece who is the caregiver stated that patient tried to transfer herself from the chair to the commode by herself and she fell and she actually felt on her side. Visited with patient today. She stated she had no pain. The fall was witnessed by her sister who also lived with her and stated that patient did hit her head. The patient stated that she did not hit her head. She's complaining of no pain in any area. Been an assessment. I don't see any lumps or bumps on her head. There's no abrasions to her body and she is not complaining of pain when doing an assessment. I just wanted to let the physician be, you know, be aware that the patient did fall today and it would be written as the incident report. If you have any questions, please give me a call back [phone]. Again, my number is [phone]. Also, the police did arrive at the patients home. They picked the patient up off the floor and put it back in her chair.  Patient did not go to the emergency room and he refused to go to 1 just to let you know

## 2023-08-08 ENCOUNTER — HOME CARE VISIT (OUTPATIENT)
Dept: HOME HEALTH SERVICES | Facility: HOME HEALTHCARE | Age: 78
End: 2023-08-08
Payer: MEDICARE

## 2023-08-08 VITALS — HEART RATE: 77 BPM | SYSTOLIC BLOOD PRESSURE: 114 MMHG | DIASTOLIC BLOOD PRESSURE: 71 MMHG

## 2023-08-08 VITALS
RESPIRATION RATE: 18 BRPM | HEART RATE: 79 BPM | DIASTOLIC BLOOD PRESSURE: 50 MMHG | TEMPERATURE: 97.5 F | SYSTOLIC BLOOD PRESSURE: 108 MMHG | OXYGEN SATURATION: 95 %

## 2023-08-08 PROCEDURE — G0299 HHS/HOSPICE OF RN EA 15 MIN: HCPCS

## 2023-08-09 ENCOUNTER — HOME CARE VISIT (OUTPATIENT)
Dept: HOME HEALTH SERVICES | Facility: HOME HEALTHCARE | Age: 78
End: 2023-08-09
Payer: MEDICARE

## 2023-08-09 ENCOUNTER — LAB REQUISITION (OUTPATIENT)
Dept: LAB | Facility: HOSPITAL | Age: 78
End: 2023-08-09
Payer: MEDICARE

## 2023-08-09 VITALS — HEART RATE: 100 BPM | DIASTOLIC BLOOD PRESSURE: 70 MMHG | SYSTOLIC BLOOD PRESSURE: 118 MMHG | OXYGEN SATURATION: 95 %

## 2023-08-09 VITALS
SYSTOLIC BLOOD PRESSURE: 118 MMHG | HEART RATE: 100 BPM | OXYGEN SATURATION: 95 % | RESPIRATION RATE: 20 BRPM | DIASTOLIC BLOOD PRESSURE: 70 MMHG

## 2023-08-09 DIAGNOSIS — D64.9 ANEMIA, UNSPECIFIED: ICD-10-CM

## 2023-08-09 LAB
25(OH)D3 SERPL-MCNC: 22.4 NG/ML (ref 30–100)
ALBUMIN SERPL BCP-MCNC: 3.2 G/DL (ref 3.5–5)
ALP SERPL-CCNC: 162 U/L (ref 46–116)
ALT SERPL W P-5'-P-CCNC: 21 U/L (ref 12–78)
ANION GAP SERPL CALCULATED.3IONS-SCNC: 9 MMOL/L
AST SERPL W P-5'-P-CCNC: 16 U/L (ref 5–45)
BASOPHILS # BLD AUTO: 0.02 THOUSANDS/ÂΜL (ref 0–0.1)
BASOPHILS NFR BLD AUTO: 0 % (ref 0–1)
BILIRUB SERPL-MCNC: 0.33 MG/DL (ref 0.2–1)
BUN SERPL-MCNC: 24 MG/DL (ref 5–25)
CALCIUM ALBUM COR SERPL-MCNC: 9.5 MG/DL (ref 8.3–10.1)
CALCIUM SERPL-MCNC: 8.9 MG/DL (ref 8.3–10.1)
CHLORIDE SERPL-SCNC: 102 MMOL/L (ref 96–108)
CO2 SERPL-SCNC: 25 MMOL/L (ref 21–32)
CREAT SERPL-MCNC: 0.65 MG/DL (ref 0.6–1.3)
EOSINOPHIL # BLD AUTO: 0.13 THOUSAND/ÂΜL (ref 0–0.61)
EOSINOPHIL NFR BLD AUTO: 2 % (ref 0–6)
ERYTHROCYTE [DISTWIDTH] IN BLOOD BY AUTOMATED COUNT: 18.2 % (ref 11.6–15.1)
GFR SERPL CREATININE-BSD FRML MDRD: 84 ML/MIN/1.73SQ M
GLUCOSE SERPL-MCNC: 85 MG/DL (ref 65–140)
HCT VFR BLD AUTO: 33.7 % (ref 34.8–46.1)
HGB BLD-MCNC: 10.1 G/DL (ref 11.5–15.4)
IMM GRANULOCYTES # BLD AUTO: 0.04 THOUSAND/UL (ref 0–0.2)
IMM GRANULOCYTES NFR BLD AUTO: 1 % (ref 0–2)
LYMPHOCYTES # BLD AUTO: 0.78 THOUSANDS/ÂΜL (ref 0.6–4.47)
LYMPHOCYTES NFR BLD AUTO: 13 % (ref 14–44)
MCH RBC QN AUTO: 27.4 PG (ref 26.8–34.3)
MCHC RBC AUTO-ENTMCNC: 30 G/DL (ref 31.4–37.4)
MCV RBC AUTO: 92 FL (ref 82–98)
MONOCYTES # BLD AUTO: 0.7 THOUSAND/ÂΜL (ref 0.17–1.22)
MONOCYTES NFR BLD AUTO: 12 % (ref 4–12)
NEUTROPHILS # BLD AUTO: 4.37 THOUSANDS/ÂΜL (ref 1.85–7.62)
NEUTS SEG NFR BLD AUTO: 72 % (ref 43–75)
NRBC BLD AUTO-RTO: 0 /100 WBCS
PLATELET # BLD AUTO: 328 THOUSANDS/UL (ref 149–390)
PMV BLD AUTO: 9.4 FL (ref 8.9–12.7)
POTASSIUM SERPL-SCNC: 4.8 MMOL/L (ref 3.5–5.3)
PROT SERPL-MCNC: 7 G/DL (ref 6.4–8.4)
RBC # BLD AUTO: 3.68 MILLION/UL (ref 3.81–5.12)
SODIUM SERPL-SCNC: 136 MMOL/L (ref 135–147)
T4 FREE SERPL-MCNC: 1.07 NG/DL (ref 0.61–1.12)
TSH SERPL DL<=0.05 MIU/L-ACNC: 1.52 UIU/ML (ref 0.45–4.5)
WBC # BLD AUTO: 6.04 THOUSAND/UL (ref 4.31–10.16)

## 2023-08-09 PROCEDURE — 84443 ASSAY THYROID STIM HORMONE: CPT | Performed by: FAMILY MEDICINE

## 2023-08-09 PROCEDURE — G0151 HHCP-SERV OF PT,EA 15 MIN: HCPCS

## 2023-08-09 PROCEDURE — 85025 COMPLETE CBC W/AUTO DIFF WBC: CPT | Performed by: FAMILY MEDICINE

## 2023-08-09 PROCEDURE — 80053 COMPREHEN METABOLIC PANEL: CPT | Performed by: FAMILY MEDICINE

## 2023-08-09 PROCEDURE — G0152 HHCP-SERV OF OT,EA 15 MIN: HCPCS

## 2023-08-09 PROCEDURE — 84439 ASSAY OF FREE THYROXINE: CPT | Performed by: FAMILY MEDICINE

## 2023-08-09 PROCEDURE — 82306 VITAMIN D 25 HYDROXY: CPT | Performed by: FAMILY MEDICINE

## 2023-08-09 NOTE — CASE COMMUNICATION
Roland Justin is agreeable to and requesting a hospital bed.   If in agreement please send referral to [facility].  Thank you

## 2023-08-10 ENCOUNTER — TELEPHONE (OUTPATIENT)
Dept: FAMILY MEDICINE CLINIC | Facility: CLINIC | Age: 78
End: 2023-08-10

## 2023-08-10 ENCOUNTER — HOME CARE VISIT (OUTPATIENT)
Dept: HOME HEALTH SERVICES | Facility: HOME HEALTHCARE | Age: 78
End: 2023-08-10
Payer: MEDICARE

## 2023-08-10 VITALS
SYSTOLIC BLOOD PRESSURE: 110 MMHG | DIASTOLIC BLOOD PRESSURE: 70 MMHG | OXYGEN SATURATION: 96 % | HEART RATE: 76 BPM | TEMPERATURE: 97.5 F | RESPIRATION RATE: 16 BRPM

## 2023-08-10 PROCEDURE — G0300 HHS/HOSPICE OF LPN EA 15 MIN: HCPCS

## 2023-08-10 NOTE — TELEPHONE ENCOUNTER
Message left on voicemail:     Hi, my name is Kevinlola. I'm a physical therapist with [facility]. I'm calling regarding a patient of Doctor Dozier Nageotte name Den Marks, first name Akin Carreno Date of birth 1945. Akin Carreno is now agreeable to a hospital bed. So I wanted to get someone to send a referral to an [facility] for DME for a hospital bed. If someone could give me a call back at [phone]. Thank you. Returned call and spoke with Fabiano, she is asking for a hospital bed order to be sent to [email or web address], [employer] [facility] or [employer] [facility].

## 2023-08-11 ENCOUNTER — HOME CARE VISIT (OUTPATIENT)
Dept: HOME HEALTH SERVICES | Facility: HOME HEALTHCARE | Age: 78
End: 2023-08-11
Payer: MEDICARE

## 2023-08-11 VITALS — RESPIRATION RATE: 20 BRPM | DIASTOLIC BLOOD PRESSURE: 70 MMHG | SYSTOLIC BLOOD PRESSURE: 120 MMHG

## 2023-08-11 PROCEDURE — G0151 HHCP-SERV OF PT,EA 15 MIN: HCPCS

## 2023-08-13 DIAGNOSIS — M54.41 ACUTE MIDLINE LOW BACK PAIN WITH RIGHT-SIDED SCIATICA: ICD-10-CM

## 2023-08-14 LAB
DME PARACHUTE DELIVERY DATE REQUESTED: NORMAL
DME PARACHUTE ITEM DESCRIPTION: NORMAL
DME PARACHUTE ORDER STATUS: NORMAL
DME PARACHUTE SUPPLIER NAME: NORMAL
DME PARACHUTE SUPPLIER PHONE: NORMAL

## 2023-08-15 ENCOUNTER — HOME CARE VISIT (OUTPATIENT)
Dept: HOME HEALTH SERVICES | Facility: HOME HEALTHCARE | Age: 78
End: 2023-08-15
Payer: MEDICARE

## 2023-08-15 VITALS — DIASTOLIC BLOOD PRESSURE: 64 MMHG | HEART RATE: 72 BPM | SYSTOLIC BLOOD PRESSURE: 116 MMHG

## 2023-08-15 VITALS
SYSTOLIC BLOOD PRESSURE: 122 MMHG | TEMPERATURE: 97.6 F | RESPIRATION RATE: 18 BRPM | HEART RATE: 72 BPM | DIASTOLIC BLOOD PRESSURE: 68 MMHG | OXYGEN SATURATION: 98 %

## 2023-08-15 DIAGNOSIS — G89.4 CHRONIC PAIN SYNDROME: ICD-10-CM

## 2023-08-15 PROCEDURE — G0299 HHS/HOSPICE OF RN EA 15 MIN: HCPCS

## 2023-08-15 PROCEDURE — G0152 HHCP-SERV OF OT,EA 15 MIN: HCPCS

## 2023-08-15 PROCEDURE — G0151 HHCP-SERV OF PT,EA 15 MIN: HCPCS

## 2023-08-15 RX ORDER — OXYCODONE HYDROCHLORIDE 5 MG/1
2.5 TABLET ORAL 2 TIMES DAILY
Qty: 30 TABLET | Refills: 0 | Status: SHIPPED | OUTPATIENT
Start: 2023-08-15 | End: 2023-09-14

## 2023-08-16 ENCOUNTER — CLINICAL SUPPORT (OUTPATIENT)
Dept: FAMILY MEDICINE CLINIC | Facility: CLINIC | Age: 78
End: 2023-08-16
Payer: MEDICARE

## 2023-08-16 DIAGNOSIS — M80.00XD AGE-RELATED OSTEOPOROSIS WITH CURRENT PATHOLOGICAL FRACTURE WITH ROUTINE HEALING: Primary | ICD-10-CM
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
## 2023-08-16 PROCEDURE — 96372 THER/PROPH/DIAG INJ SC/IM: CPT | Performed by: FAMILY MEDICINE

## 2023-08-18 ENCOUNTER — HOME CARE VISIT (OUTPATIENT)
Dept: HOME HEALTH SERVICES | Facility: HOME HEALTHCARE | Age: 78
End: 2023-08-18
Payer: MEDICARE

## 2023-08-18 VITALS
RESPIRATION RATE: 18 BRPM | OXYGEN SATURATION: 95 % | TEMPERATURE: 97.7 F | DIASTOLIC BLOOD PRESSURE: 68 MMHG | HEART RATE: 80 BPM | SYSTOLIC BLOOD PRESSURE: 104 MMHG

## 2023-08-18 VITALS — DIASTOLIC BLOOD PRESSURE: 86 MMHG | SYSTOLIC BLOOD PRESSURE: 138 MMHG | RESPIRATION RATE: 20 BRPM

## 2023-08-18 PROCEDURE — G0158 HHC OT ASSISTANT EA 15: HCPCS

## 2023-08-18 PROCEDURE — G0151 HHCP-SERV OF PT,EA 15 MIN: HCPCS

## 2023-08-18 PROCEDURE — G0299 HHS/HOSPICE OF RN EA 15 MIN: HCPCS

## 2023-08-21 ENCOUNTER — TELEPHONE (OUTPATIENT)
Dept: FAMILY MEDICINE CLINIC | Facility: CLINIC | Age: 78
End: 2023-08-21

## 2023-08-21 ENCOUNTER — HOME CARE VISIT (OUTPATIENT)
Dept: HOME HEALTH SERVICES | Facility: HOME HEALTHCARE | Age: 78
End: 2023-08-21
Payer: MEDICARE

## 2023-08-21 NOTE — TELEPHONE ENCOUNTER
Message left by patient's niece:     Hi, this is Munirnhungnora Jennifer. I'm calling in regards to wanna colon. I am calling. I have talked to the pharmacist for her Methocarbamol. He says he has sent over 2 requests for the pre auth for the medication. He has not received anything back. She is she does not have any. So if someone can send in that documentation again, it's for Robi Velazquez date of birth [birth year]. Thank you. Will start prior auth.

## 2023-08-22 ENCOUNTER — HOME CARE VISIT (OUTPATIENT)
Dept: HOME HEALTH SERVICES | Facility: HOME HEALTHCARE | Age: 78
End: 2023-08-22
Payer: MEDICARE

## 2023-08-22 ENCOUNTER — TELEPHONE (OUTPATIENT)
Dept: FAMILY MEDICINE CLINIC | Facility: CLINIC | Age: 78
End: 2023-08-22

## 2023-08-22 VITALS — SYSTOLIC BLOOD PRESSURE: 114 MMHG | HEART RATE: 68 BPM | DIASTOLIC BLOOD PRESSURE: 68 MMHG | OXYGEN SATURATION: 99 %

## 2023-08-22 VITALS — HEART RATE: 88 BPM | SYSTOLIC BLOOD PRESSURE: 114 MMHG | DIASTOLIC BLOOD PRESSURE: 68 MMHG | OXYGEN SATURATION: 99 %

## 2023-08-22 VITALS
TEMPERATURE: 97.2 F | OXYGEN SATURATION: 99 % | DIASTOLIC BLOOD PRESSURE: 68 MMHG | SYSTOLIC BLOOD PRESSURE: 114 MMHG | HEART RATE: 88 BPM | RESPIRATION RATE: 18 BRPM

## 2023-08-22 PROCEDURE — G0299 HHS/HOSPICE OF RN EA 15 MIN: HCPCS

## 2023-08-22 PROCEDURE — G0151 HHCP-SERV OF PT,EA 15 MIN: HCPCS

## 2023-08-22 PROCEDURE — G0152 HHCP-SERV OF OT,EA 15 MIN: HCPCS

## 2023-08-22 NOTE — TELEPHONE ENCOUNTER
Message left on voicemail:     Hi, this is Mara Nye, a visiting nurse from [facility] calling about a patient [address] Her YOB: 1945. Just wanted to follow up on where things were with the hospital bed. I believe our physical therapist had called maybe a week ago requesting an order be put into a [address] [place] [address] for a hospital bed for Mathew Grandchild. She has not received it as of yet so just checking to see the status of that and if there's anything further you needed, please call me back at [phone]. Thank you. Called and spoke with Mara Nye, made her aware of the pending status and provided her with the phone number for [facility].

## 2023-08-24 ENCOUNTER — HOME CARE VISIT (OUTPATIENT)
Dept: HOME HEALTH SERVICES | Facility: HOME HEALTHCARE | Age: 78
End: 2023-08-24
Payer: MEDICARE

## 2023-08-24 VITALS
TEMPERATURE: 98.2 F | DIASTOLIC BLOOD PRESSURE: 70 MMHG | RESPIRATION RATE: 16 BRPM | HEART RATE: 82 BPM | OXYGEN SATURATION: 100 % | SYSTOLIC BLOOD PRESSURE: 110 MMHG

## 2023-08-24 VITALS — SYSTOLIC BLOOD PRESSURE: 112 MMHG | RESPIRATION RATE: 20 BRPM | DIASTOLIC BLOOD PRESSURE: 74 MMHG

## 2023-08-24 PROCEDURE — G0300 HHS/HOSPICE OF LPN EA 15 MIN: HCPCS

## 2023-08-24 PROCEDURE — G0151 HHCP-SERV OF PT,EA 15 MIN: HCPCS

## 2023-08-25 NOTE — CASE COMMUNICATION
Informational only. . no response required    Patient dc for [facility] PT intervention this date. Patient can transfer from chair lift safely and able to independently achieve initial standing balance. Is ambulatory with Rw and cg of short distances 12 to 15 ft. Referral was placed to [facility] (OUTPATIENT [facility]AMP[facility]) for continued PT intervention upon dc from [facility] services.

## 2023-08-26 DIAGNOSIS — G25.81 RESTLESS LEGS SYNDROME: ICD-10-CM

## 2023-08-26 DIAGNOSIS — M54.41 ACUTE MIDLINE LOW BACK PAIN WITH RIGHT-SIDED SCIATICA: ICD-10-CM

## 2023-08-26 DIAGNOSIS — E56.9 VITAMIN DEFICIENCY: ICD-10-CM

## 2023-08-29 ENCOUNTER — HOME CARE VISIT (OUTPATIENT)
Dept: HOME HEALTH SERVICES | Facility: HOME HEALTHCARE | Age: 78
End: 2023-08-29
Payer: MEDICARE

## 2023-08-29 VITALS
HEART RATE: 74 BPM | TEMPERATURE: 97.2 F | DIASTOLIC BLOOD PRESSURE: 60 MMHG | RESPIRATION RATE: 18 BRPM | OXYGEN SATURATION: 96 % | SYSTOLIC BLOOD PRESSURE: 102 MMHG

## 2023-08-29 PROCEDURE — G0299 HHS/HOSPICE OF RN EA 15 MIN: HCPCS

## 2023-08-31 ENCOUNTER — HOME CARE VISIT (OUTPATIENT)
Dept: HOME HEALTH SERVICES | Facility: HOME HEALTHCARE | Age: 78
End: 2023-08-31
Payer: MEDICARE

## 2023-08-31 DIAGNOSIS — K21.9 REFLUX LARYNGITIS: ICD-10-CM

## 2023-08-31 DIAGNOSIS — J04.0 REFLUX LARYNGITIS: ICD-10-CM

## 2023-08-31 PROCEDURE — G0299 HHS/HOSPICE OF RN EA 15 MIN: HCPCS

## 2023-08-31 RX ORDER — METHOCARBAMOL 500 MG/1
500 TABLET, FILM COATED ORAL DAILY PRN
Qty: 30 TABLET | Refills: 0 | Status: SHIPPED | OUTPATIENT
Start: 2023-08-31 | End: 2023-09-08 | Stop reason: SDUPTHER

## 2023-08-31 RX ORDER — GABAPENTIN 100 MG/1
200 CAPSULE ORAL 3 TIMES DAILY
Qty: 180 CAPSULE | Refills: 0 | Status: SHIPPED | OUTPATIENT
Start: 2023-08-31

## 2023-08-31 RX ORDER — FAMOTIDINE 40 MG/5ML
20 POWDER, FOR SUSPENSION ORAL 2 TIMES DAILY
Qty: 150 ML | Refills: 0 | Status: SHIPPED | OUTPATIENT
Start: 2023-08-31

## 2023-08-31 RX ORDER — ROPINIROLE 2 MG/1
2 TABLET, FILM COATED ORAL
Qty: 30 TABLET | Refills: 0 | OUTPATIENT
Start: 2023-08-31

## 2023-08-31 RX ORDER — FOLIC ACID 1 MG/1
2 TABLET ORAL DAILY
Qty: 60 TABLET | Refills: 0 | Status: SHIPPED | OUTPATIENT
Start: 2023-08-31

## 2023-09-01 VITALS
HEART RATE: 68 BPM | SYSTOLIC BLOOD PRESSURE: 126 MMHG | TEMPERATURE: 97.6 F | OXYGEN SATURATION: 97 % | RESPIRATION RATE: 18 BRPM | DIASTOLIC BLOOD PRESSURE: 72 MMHG

## 2023-09-08 ENCOUNTER — OFFICE VISIT (OUTPATIENT)
Dept: FAMILY MEDICINE CLINIC | Facility: CLINIC | Age: 78
End: 2023-09-08
Payer: MEDICARE

## 2023-09-08 ENCOUNTER — TELEPHONE (OUTPATIENT)
Dept: FAMILY MEDICINE CLINIC | Facility: CLINIC | Age: 78
End: 2023-09-08

## 2023-09-08 VITALS
TEMPERATURE: 99.1 F | OXYGEN SATURATION: 100 % | HEIGHT: 57 IN | HEART RATE: 86 BPM | DIASTOLIC BLOOD PRESSURE: 65 MMHG | BODY MASS INDEX: 22.22 KG/M2 | WEIGHT: 103 LBS | RESPIRATION RATE: 16 BRPM | SYSTOLIC BLOOD PRESSURE: 134 MMHG

## 2023-09-08 DIAGNOSIS — R60.9 EDEMA, UNSPECIFIED TYPE: Primary | ICD-10-CM

## 2023-09-08 DIAGNOSIS — G25.81 RESTLESS LEGS SYNDROME: ICD-10-CM

## 2023-09-08 DIAGNOSIS — J84.10 PULMONARY FIBROSIS, UNSPECIFIED (HCC): ICD-10-CM

## 2023-09-08 DIAGNOSIS — F11.20 CONTINUOUS OPIOID DEPENDENCE (HCC): ICD-10-CM

## 2023-09-08 DIAGNOSIS — M54.41 ACUTE MIDLINE LOW BACK PAIN WITH RIGHT-SIDED SCIATICA: ICD-10-CM

## 2023-09-08 PROCEDURE — 99214 OFFICE O/P EST MOD 30 MIN: CPT | Performed by: FAMILY MEDICINE

## 2023-09-08 RX ORDER — METHOCARBAMOL 500 MG/1
500 TABLET, FILM COATED ORAL DAILY PRN
Qty: 30 TABLET | Refills: 0 | Status: SHIPPED | OUTPATIENT
Start: 2023-09-08

## 2023-09-08 RX ORDER — ROPINIROLE 2 MG/1
2 TABLET, FILM COATED ORAL
Qty: 30 TABLET | Refills: 0 | Status: SHIPPED | OUTPATIENT
Start: 2023-09-08

## 2023-09-08 NOTE — TELEPHONE ENCOUNTER
Called and spoke with pharmacist Antione Roque at [facility] to add 2 refills for prescriptions sent electronically for Ropinirole 2 mg and Methocarbamol 500 mg as per Danny Barlow. Pharmacist will cancel the orders sent and take a verbal order for these medications adding 2 refills per medication.

## 2023-09-08 NOTE — PROGRESS NOTES
Name: José Manuel Espino      : 1945      MRN: [id number]  Encounter Provider: Anna Mattson MD  Encounter Date: 2023   Encounter department: [facility]    Assessment & Plan     1: Pre-Op forMICRO DIRECT LARYNGOSCOPY, CP MYOTOMY/ENDOSCOPIC CO2 LASER, POSSIBLE ESOPHAGOSCOPY POSSIBLE ESOPHAGOSCOPY DILATION (Throat)       MYOTOMY CRICOPHARYNGEAL (Throat)      Regarding her surgery patient discussed. Patient is aware of the procedure and is aware that the surgeon has accepted the preop testing that she did over at the hospital back in . Has no acute no new acute complaints. Patient wants medication refills which I will do below. Patient was also asked to get a bilateral lower extremity venous Doppler especially preop because of history will because of the leg swelling to rule out any possible DVTs as well. Patient was given general routine instructions regarding preop surgical surgery. Surgeries in the past and denies any reaction to any anesthesia patient has had patient was asked not to take any aspirin or any anti-inflammatories any vitamins 1 week before. Patient is cleared for her surgical procedure from my standpoint. Patient can follow-up with me in about a month after surgery for routine follow-up. 1. Edema, unspecified type  -     VAS lower limb venous duplex study, complete bilateral; Future; Expected date: 2023    2. Restless legs syndrome  -     rOPINIRole (REQUIP) 2 mg tablet; 1 tablet (2 mg total) by Per G Tube route daily at bedtime    3. Acute midline low back pain with right-sided sciatica  -     methocarbamol (ROBAXIN) 500 mg tablet; 1 tablet (500 mg total) by Per G Tube route daily as needed for muscle spasms    4. Continuous opioid dependence ([address])    5. Pulmonary fibrosis, unspecified ([address])       Regarding the swelling patient was order bilateral lower extremity venous Doppler as I mentioned up above.   Patient's medication Requip and Robaxin are appropriately refilled. Regarding patient's opiate dependence which is low dose for arthritis and her pulmonary fibrosis are both stable. Patient was asked to see me a month after her surgery for routine follow-up. Subjective      67-year-old female here with her niece please for free surgical clearance for her throat surgery. Patient was in the hospital in June and the surgeon has accepted to her blood work and EKG from that hospitalization as his preop labs. Patient also needs medication refills. Review of Systems   Constitutional: Negative for activity change, appetite change, chills, fatigue and fever. HENT: Positive for trouble swallowing. Negative for congestion. Eyes: Negative for visual disturbance. Respiratory: Negative for cough and shortness of breath. Cardiovascular: Positive for leg swelling. Negative for chest pain and palpitations. Gastrointestinal: Negative for abdominal pain, blood in stool, nausea and vomiting. Genitourinary: Negative for difficulty urinating, dysuria, hematuria and pelvic pain. Musculoskeletal: Positive for arthralgias. Skin: Negative for color change and rash. Neurological: Negative for dizziness. Hematological: Does not bruise/bleed easily. Psychiatric/Behavioral: Negative for confusion.        Current Outpatient Medications on File Prior to Visit   Medication Sig   • acetaminophen (TYLENOL) 650 mg CR tablet Take 2 tablets (1,300 mg total) by mouth 3 (three) times a day   • Cholecalciferol 50 MCG (2000 UT) TBDP 2 tablets (4,000 Units total) by Per PEG Tube route daily   • famotidine (PEPCID) 20 mg/2.5 mL oral suspension 2.5 mL (20 mg total) by Per G Tube route 2 (two) times a day Before breakfast and at bedtime   • folic acid (FOLVITE) 1 mg tablet 2 tablets (2 mg total) by Per PEG Tube route daily   • gabapentin (NEURONTIN) 100 mg capsule 2 capsules (200 mg total) by Per PEG Tube route 3 (three) times a day   • levothyroxine 75 mcg tablet 1 tablet (75 mcg total) by Per G Tube route daily in the early morning   • lidocaine (LMX) 4 % cream Apply topically as needed for mild pain   • Melatonin 5 MG CHEW 5 mg by Per PEG Tube route daily at bedtime   • Multiple Vitamins-Minerals (CENTRUM SILVER PO) Take 1 tablet by mouth daily   • nortriptyline (PAMELOR) 50 mg capsule 2 capsules (100 mg total) by Per PEG Tube route daily at bedtime   • oxyCODONE (Roxicodone) 5 immediate release tablet 0.5 tablets (2.5 mg total) by Per PEG Tube route 2 (two) times a day Max Daily Amount: 5 mg   • polyethylene glycol (MIRALAX) 17 g packet 17 g by Per PEG Tube route daily   • senna-docusate sodium (SENOKOT S) 8.6-50 mg per tablet 1 tablet by Per G Tube route 2 (two) times a day as needed for constipation   • [DISCONTINUED] methocarbamol (ROBAXIN) 500 mg tablet 1 tablet (500 mg total) by Per G Tube route daily as needed for muscle spasms   • [DISCONTINUED] rOPINIRole (REQUIP) 2 mg tablet 1 tablet (2 mg total) by Per G Tube route daily at bedtime   • hydroxychloroquine (PLAQUENIL) 200 mg tablet 1 tablet (200 mg total) by Per PEG Tube route every morning AND 0.5 tablets (100 mg total) daily at bedtime. 1 tab in am 1/2 tab in pm.   • Methotrexate 2.5 MG/ML SOLN 3 mL by Per PEG Tube route every 7 days (Patient not taking: Reported on 9/8/2023)   • [DISCONTINUED] Calcium Ascorbate (VITAMIN C) 500 mg tablet Take 500 mg by mouth daily       Objective     /65 (BP Location: Left arm, Patient Position: Sitting, Cuff Size: Adult)   Pulse 86   Temp 99.1 °F (37.3 °C) (Tympanic)   Resp 16   Ht 4' 9" (1.448 m)   Wt 46.7 kg (103 lb)   SpO2 100%   BMI 22.29 kg/m²     Physical Exam  Constitutional:       Appearance: Normal appearance. HENT:      Head: Normocephalic and atraumatic. Nose: Nose normal.      Mouth/Throat:      Mouth: Mucous membranes are moist.      Pharynx: Oropharynx is clear. No posterior oropharyngeal erythema.    Eyes:      Extraocular Movements: Extraocular movements intact. Conjunctiva/sclera: Conjunctivae normal.   Neck:      Vascular: No carotid bruit. Cardiovascular:      Rate and Rhythm: Normal rate and regular rhythm. Pulmonary:      Effort: Pulmonary effort is normal.      Breath sounds: Normal breath sounds. Abdominal:      General: Bowel sounds are normal.      Palpations: Abdomen is soft. Tenderness: There is no abdominal tenderness. Musculoskeletal:      Cervical back: Normal range of motion and neck supple. No tenderness. Right lower leg: Edema present. Left lower leg: Edema present. Comments: Patient has chronic mild leg swelling more on the left lower leg than the right. Neurological:      General: No focal deficit present. Mental Status: She is alert and oriented to person, place, and time. Mental status is at baseline.    Psychiatric:         Mood and Affect: Mood normal.         Behavior: Behavior normal.       Shannon Wilson MD

## 2023-09-08 NOTE — PROGRESS NOTES
Subjective:      Toby Celaya is a 77 y.o. female who presents to the office today for a preoperative consultation at the request of surgeon *** who plans on performing *** on {month:} {:11573}. This consultation is requested for the specific conditions prompting preoperative evaluation (i.e. because of potential affect on operative risk): ***. Planned anesthesia is {anesthesia type:812}. The patient has the following known anesthesia issues: {anesthesia problems:48154}. Patient has a bleeding risk of: {bleeding risk:35233}. Patient {does/does not:13479} have objections to receiving blood products if needed. {Common ambulatory SmartLinks:08183}    Review of Systems  {ros; complete:47843}       Objective:      Physical Exam  {exam; complete:19885}    Predictors of intubation difficulty:   Morbid obesity? {yes***/no:88343::"no"}   Anatomically abnormal facies? {yes***/no:06357::"no"}   Prominent incisors? {yes***/no:85726::"no"}   Receding mandible? {yes***/no:57662::"no"}   Short, thick neck? {yes***/no:88998::"no"}   Neck range of motion: {normal/abnormal:99243::"normal"}   Mallampati score: {score:84283}   Thyromental distance: {<6/>6cm:88996}   Mouth opening: ***cm   Dentition: {teeth; chipped/loose:77368}    Cardiographics  ECG: {findings; ec}  Echocardiogram: {findings; VADH:60775}    Imaging  Chest x-ray: {findings; cxr:11385}     Lab Review   {recent ZQOM:32749::"RMU applicable"}       Assessment:      77 y.o. female with planned surgery as above. Known risk factors for perioperative complications: {risk [facility]:38318::"OVXX"}    Difficulty with intubation {is/is not:9024} anticipated. Cardiac Risk Estimation: per the Revised Cardiac Risk Index (Circ. 100:1043, 1999), the patient's risk factors for cardiac complications include {cardiac risk index:90432}, putting her in: {risk class:15403}    Current medications which may produce withdrawal symptoms if withheld perioperatively: ***. Plan:      1. Preoperative workup as follows {workup:68398}. 2. Change in medication regimen before surgery: {preop meds:29231}. 3. Prophylaxis for cardiac events with perioperative beta-blockers: {not indicated/consider:00935}. 4. Invasive hemodynamic monitoring perioperatively: {not indicated/strongly advise:17714}. 5. Deep vein thrombosis prophylaxis postoperatively:{not indicated/consider:90822}. 6. Surveillance for postoperative MI with ECG immediately postoperatively and on postoperative days 1 and 2 AND troponin levels 24 hours postoperatively and on day 4 or hospital discharge (whichever comes first): {not indicated/strongly advise:12915}.   7. Other measures: {preoperative recommendations:40903}

## 2023-09-12 NOTE — PRE-PROCEDURE INSTRUCTIONS
Group Therapy Note    Date: 7/25/2023    Group Start Time: 1400  Group End Time: 1430  Group Topic: Healthy Living/Wellness    [facility]    Meka Quan        Group Therapy Note    Attendees: 5       Patient's Goal:  To attend group    Notes:  Pt was attentive     Status After Intervention:  Unchanged    Participation Level: Active Listener    Participation Quality: Appropriate      Speech:  normal      Thought Process/Content: Logical      Affective Functioning: Congruent      Mood: euthymic      Level of consciousness:  Alert      Response to Learning: Able to verbalize current knowledge/experience      Endings: None Reported    Modes of Intervention: Activity      Discipline Responsible: [employer]      Signature:   Delonte Ochoa Pre-Surgery Instructions:   Medication Instructions   • acetaminophen (TYLENOL) 650 mg CR tablet Hold day of surgery     • Cholecalciferol 50 MCG (2000 UT) TBDP Hold from now till after procedure      • famotidine (PEPCID) 20 mg/2.5 mL oral suspension Take this medication day of surgery if normally taken in the morning. • folic acid (FOLVITE) 1 mg tablet Take this medication day of surgery if normally taken in the morning. • gabapentin (NEURONTIN) 100 mg capsule Take this medication day of surgery if normally taken in the morning. • levothyroxine 75 mcg tablet Take this medication day of surgery if normally taken in the morning. • lidocaine (LMX) 4 % cream Hold day of surgery     • Melatonin 5 MG CHEW Normally takes at night. • methocarbamol (ROBAXIN) 500 mg tablet Hold day of surgery     • Multiple Vitamins-Minerals (CENTRUM SILVER PO) Hold from now till after procedure      • nortriptyline (PAMELOR) 50 mg capsule Normally takes at night. • oxyCODONE (Roxicodone) 5 immediate release tablet May use day of surgery if needed     • polyethylene glycol (MIRALAX) 17 g packet Hold day of surgery     • rOPINIRole (REQUIP) 2 mg tablet Normally takes at night. Pt takes all meds via PEG tube. Will give morning meds with small flush via PEG DOS. Pt niece instructed to stop tube feeding at midnight night before procedure. Medication instructions for day surgery reviewed. Please use only a sip of water to take your instructed medications. Avoid all over the counter vitamins, supplements and NSAIDS for one week prior to surgery per anesthesia guidelines. Tylenol is ok to take as needed. You will receive a call one business day prior to surgery with an arrival time and hospital directions. If your surgery is scheduled on a Monday, the hospital will be calling you on the Friday prior to your surgery.  If you have not heard from anyone by 8pm, please call the hospital supervisor through the hospital  at [phone]. Fabienne Jose [phone]). Do not eat or drink anything after midnight the night before your surgery, including candy, mints, lifesavers, or chewing gum. Do not drink alcohol 24hrs before your surgery. Try not to smoke at least 24hrs before your surgery. Follow the pre surgery showering instructions as listed in the [facility] Surgical Experience Booklet” or otherwise provided by your surgeon's office. Do not shave the surgical area 24 hours before surgery. Do not apply any lotions, creams, including makeup, cologne, deodorant, or perfumes after showering on the day of your surgery. No contact lenses, eye make-up, or artificial eyelashes. Remove nail polish, including gel polish, and any artificial, gel, or acrylic nails if possible. Remove all jewelry including rings and body piercing jewelry. Wear causal clothing that is easy to take on and off. Consider your type of surgery. Keep any valuables, jewelry, piercings at home. Please bring any specially ordered equipment (sling, braces) if indicated. Arrange for a responsible person to drive you to and from the hospital on the day of your surgery. Visitor Guidelines discussed. Call the surgeon's office with any new illnesses, exposures, or additional questions prior to surgery. Please reference your [facility] Surgical Experience Booklet” for additional information to prepare for your upcoming surgery.

## 2023-09-13 ENCOUNTER — HOSPITAL ENCOUNTER (OUTPATIENT)
Dept: NON INVASIVE DIAGNOSTICS | Facility: CLINIC | Age: 78
Discharge: HOME | End: 2023-09-13
Payer: MEDICARE

## 2023-09-13 DIAGNOSIS — R60.9 EDEMA, UNSPECIFIED TYPE: ICD-10-CM

## 2023-09-13 PROCEDURE — 93970 EXTREMITY STUDY: CPT | Performed by: SURGERY

## 2023-09-13 PROCEDURE — 93970 EXTREMITY STUDY: CPT

## 2023-09-14 ENCOUNTER — ANESTHESIA EVENT (OUTPATIENT)
Dept: PERIOP | Facility: HOSPITAL | Age: 78
End: 2023-09-14
Payer: MEDICARE

## 2023-09-15 ENCOUNTER — ANESTHESIA (OUTPATIENT)
Dept: PERIOP | Facility: HOSPITAL | Age: 78
End: 2023-09-15
Payer: MEDICARE

## 2023-09-15 ENCOUNTER — HOSPITAL ENCOUNTER (OUTPATIENT)
Facility: HOSPITAL | Age: 78
Setting detail: OUTPATIENT SURGERY
Discharge: HOME | End: 2023-09-15
Attending: OTOLARYNGOLOGY | Admitting: OTOLARYNGOLOGY
Payer: MEDICARE

## 2023-09-15 ENCOUNTER — APPOINTMENT (OUTPATIENT)
Dept: RADIOLOGY | Facility: HOSPITAL | Age: 78
End: 2023-09-15
Payer: MEDICARE

## 2023-09-15 VITALS
SYSTOLIC BLOOD PRESSURE: 156 MMHG | DIASTOLIC BLOOD PRESSURE: 87 MMHG | TEMPERATURE: 97 F | RESPIRATION RATE: 18 BRPM | BODY MASS INDEX: 22.22 KG/M2 | OXYGEN SATURATION: 95 % | HEART RATE: 105 BPM | WEIGHT: 103 LBS | HEIGHT: 57 IN

## 2023-09-15 DIAGNOSIS — G89.4 CHRONIC PAIN SYNDROME: ICD-10-CM

## 2023-09-15 DIAGNOSIS — J39.2 CRICOPHARYNGEAL HYPERTROPHY: ICD-10-CM

## 2023-09-15 DIAGNOSIS — G89.18 POSTOPERATIVE PAIN: Primary | ICD-10-CM

## 2023-09-15 PROBLEM — Z91.89 AT RISK FOR ASPIRATION: Status: ACTIVE | Noted: 2023-09-15

## 2023-09-15 PROBLEM — K22.2 SCHATZKI'S RING: Status: ACTIVE | Noted: 2023-09-15

## 2023-09-15 PROBLEM — K22.4 ESOPHAGEAL DYSMOTILITY: Status: ACTIVE | Noted: 2023-09-15

## 2023-09-15 PROBLEM — J04.0 REFLUX LARYNGITIS: Status: ACTIVE | Noted: 2023-09-15

## 2023-09-15 PROBLEM — Z93.1 S/P PERCUTANEOUS ENDOSCOPIC GASTROSTOMY (PEG) TUBE PLACEMENT (HCC): Status: ACTIVE | Noted: 2023-09-15

## 2023-09-15 PROBLEM — K21.9 GASTROESOPHAGEAL REFLUX DISEASE WITHOUT ESOPHAGITIS: Status: ACTIVE | Noted: 2023-09-15

## 2023-09-15 PROBLEM — J38.01 PARESIS OF RIGHT VOCAL FOLD: Status: ACTIVE | Noted: 2023-09-15

## 2023-09-15 PROBLEM — J38.1 REINKE'S EDEMA OF VOCAL FOLDS: Status: ACTIVE | Noted: 2023-09-15

## 2023-09-15 PROBLEM — K21.9 REFLUX LARYNGITIS: Status: ACTIVE | Noted: 2023-09-15

## 2023-09-15 PROCEDURE — 43030 CRICOPHARYNGEAL MYOTOMY: CPT | Performed by: OTOLARYNGOLOGY

## 2023-09-15 PROCEDURE — 71045 X-RAY EXAM CHEST 1 VIEW: CPT

## 2023-09-15 PROCEDURE — 31526 DX LARYNGOSCOPY W/OPER SCOPE: CPT | Performed by: OTOLARYNGOLOGY

## 2023-09-15 RX ORDER — DEXAMETHASONE SODIUM PHOSPHATE 10 MG/ML
INJECTION, SOLUTION INTRAMUSCULAR; INTRAVENOUS AS NEEDED
Status: DISCONTINUED | OUTPATIENT
Start: 2023-09-15 | End: 2023-09-15

## 2023-09-15 RX ORDER — SODIUM CHLORIDE, SODIUM LACTATE, POTASSIUM CHLORIDE, CALCIUM CHLORIDE 600; 310; 30; 20 MG/100ML; MG/100ML; MG/100ML; MG/100ML
50 INJECTION, SOLUTION INTRAVENOUS CONTINUOUS
Status: DISCONTINUED | OUTPATIENT
Start: 2023-09-15 | End: 2023-09-15 | Stop reason: HOSPADM

## 2023-09-15 RX ORDER — ONDANSETRON 2 MG/ML
4 INJECTION INTRAMUSCULAR; INTRAVENOUS ONCE AS NEEDED
Status: COMPLETED | OUTPATIENT
Start: 2023-09-15 | End: 2023-09-15

## 2023-09-15 RX ORDER — FENTANYL CITRATE/PF 50 MCG/ML
25 SYRINGE (ML) INJECTION
Status: DISCONTINUED | OUTPATIENT
Start: 2023-09-15 | End: 2023-09-15 | Stop reason: HOSPADM

## 2023-09-15 RX ORDER — CLINDAMYCIN PALMITATE HYDROCHLORIDE 75 MG/5ML
150 SOLUTION ORAL 3 TIMES DAILY
Qty: 150 ML | Refills: 0 | Status: SHIPPED | OUTPATIENT
Start: 2023-09-15 | End: 2023-09-15 | Stop reason: SDUPTHER

## 2023-09-15 RX ORDER — EPHEDRINE SULFATE 50 MG/ML
INJECTION INTRAVENOUS AS NEEDED
Status: DISCONTINUED | OUTPATIENT
Start: 2023-09-15 | End: 2023-09-15

## 2023-09-15 RX ORDER — ACETAMINOPHEN 160 MG/5ML
650 SUSPENSION ORAL ONCE
Status: COMPLETED | OUTPATIENT
Start: 2023-09-15 | End: 2023-09-15

## 2023-09-15 RX ORDER — OXYMETAZOLINE HYDROCHLORIDE 0.05 G/100ML
SPRAY NASAL AS NEEDED
Status: DISCONTINUED | OUTPATIENT
Start: 2023-09-15 | End: 2023-09-15 | Stop reason: HOSPADM

## 2023-09-15 RX ORDER — OXYCODONE HCL 5 MG/5 ML
5 SOLUTION, ORAL ORAL ONCE
Status: COMPLETED | OUTPATIENT
Start: 2023-09-15 | End: 2023-09-15

## 2023-09-15 RX ORDER — ONDANSETRON 2 MG/ML
INJECTION INTRAMUSCULAR; INTRAVENOUS AS NEEDED
Status: DISCONTINUED | OUTPATIENT
Start: 2023-09-15 | End: 2023-09-15

## 2023-09-15 RX ORDER — LIDOCAINE HYDROCHLORIDE 20 MG/ML
INJECTION, SOLUTION EPIDURAL; INFILTRATION; INTRACAUDAL; PERINEURAL AS NEEDED
Status: DISCONTINUED | OUTPATIENT
Start: 2023-09-15 | End: 2023-09-15 | Stop reason: HOSPADM

## 2023-09-15 RX ORDER — PROPOFOL 10 MG/ML
INJECTION, EMULSION INTRAVENOUS CONTINUOUS PRN
Status: DISCONTINUED | OUTPATIENT
Start: 2023-09-15 | End: 2023-09-15

## 2023-09-15 RX ORDER — LIDOCAINE HYDROCHLORIDE 10 MG/ML
INJECTION, SOLUTION EPIDURAL; INFILTRATION; INTRACAUDAL; PERINEURAL AS NEEDED
Status: DISCONTINUED | OUTPATIENT
Start: 2023-09-15 | End: 2023-09-15

## 2023-09-15 RX ORDER — SODIUM CHLORIDE, SODIUM LACTATE, POTASSIUM CHLORIDE, CALCIUM CHLORIDE 600; 310; 30; 20 MG/100ML; MG/100ML; MG/100ML; MG/100ML
INJECTION, SOLUTION INTRAVENOUS CONTINUOUS PRN
Status: DISCONTINUED | OUTPATIENT
Start: 2023-09-15 | End: 2023-09-15

## 2023-09-15 RX ORDER — METOCLOPRAMIDE HYDROCHLORIDE 5 MG/ML
5 INJECTION INTRAMUSCULAR; INTRAVENOUS ONCE
Status: COMPLETED | OUTPATIENT
Start: 2023-09-15 | End: 2023-09-15

## 2023-09-15 RX ORDER — OXYCODONE HCL 5 MG/5 ML
5 SOLUTION, ORAL ORAL EVERY 4 HOURS PRN
Qty: 50 ML | Refills: 0 | Status: SHIPPED | OUTPATIENT
Start: 2023-09-15 | End: 2023-09-15 | Stop reason: SDUPTHER

## 2023-09-15 RX ORDER — FENTANYL CITRATE 50 UG/ML
INJECTION, SOLUTION INTRAMUSCULAR; INTRAVENOUS AS NEEDED
Status: DISCONTINUED | OUTPATIENT
Start: 2023-09-15 | End: 2023-09-15

## 2023-09-15 RX ORDER — ROCURONIUM BROMIDE 10 MG/ML
INJECTION, SOLUTION INTRAVENOUS AS NEEDED
Status: DISCONTINUED | OUTPATIENT
Start: 2023-09-15 | End: 2023-09-15

## 2023-09-15 RX ORDER — PROPOFOL 10 MG/ML
INJECTION, EMULSION INTRAVENOUS AS NEEDED
Status: DISCONTINUED | OUTPATIENT
Start: 2023-09-15 | End: 2023-09-15

## 2023-09-15 RX ADMIN — DEXAMETHASONE SODIUM PHOSPHATE 10 MG: 10 INJECTION, SOLUTION INTRAMUSCULAR; INTRAVENOUS at 10:44

## 2023-09-15 RX ADMIN — FENTANYL CITRATE 25 MCG: 50 INJECTION, SOLUTION INTRAMUSCULAR; INTRAVENOUS at 11:01

## 2023-09-15 RX ADMIN — FENTANYL CITRATE 25 MCG: 50 INJECTION, SOLUTION INTRAMUSCULAR; INTRAVENOUS at 11:58

## 2023-09-15 RX ADMIN — LIDOCAINE HYDROCHLORIDE 60 MG: 10 INJECTION, SOLUTION EPIDURAL; INFILTRATION; INTRACAUDAL; PERINEURAL at 10:26

## 2023-09-15 RX ADMIN — METOCLOPRAMIDE 5 MG: 5 INJECTION, SOLUTION INTRAMUSCULAR; INTRAVENOUS at 13:13

## 2023-09-15 RX ADMIN — PROPOFOL 150 MCG/KG/MIN: 10 INJECTION, EMULSION INTRAVENOUS at 10:33

## 2023-09-15 RX ADMIN — PROPOFOL 150 MG: 10 INJECTION, EMULSION INTRAVENOUS at 10:26

## 2023-09-15 RX ADMIN — REMIFENTANIL HYDROCHLORIDE 0.1 MCG/KG/MIN: 1 INJECTION, POWDER, LYOPHILIZED, FOR SOLUTION INTRAVENOUS at 10:33

## 2023-09-15 RX ADMIN — OXYCODONE HYDROCHLORIDE 5 MG: 5 SOLUTION ORAL at 16:02

## 2023-09-15 RX ADMIN — ONDANSETRON 4 MG: 2 INJECTION INTRAMUSCULAR; INTRAVENOUS at 12:45

## 2023-09-15 RX ADMIN — FENTANYL CITRATE 25 MCG: 50 INJECTION INTRAMUSCULAR; INTRAVENOUS at 13:32

## 2023-09-15 RX ADMIN — FENTANYL CITRATE 50 MCG: 50 INJECTION, SOLUTION INTRAMUSCULAR; INTRAVENOUS at 10:26

## 2023-09-15 RX ADMIN — ACETAMINOPHEN 650 MG: 650 SUSPENSION ORAL at 16:02

## 2023-09-15 RX ADMIN — EPHEDRINE SULFATE 5 MG: 50 INJECTION INTRAVENOUS at 10:45

## 2023-09-15 RX ADMIN — SUGAMMADEX 100 MG: 100 INJECTION, SOLUTION INTRAVENOUS at 12:24

## 2023-09-15 RX ADMIN — ROCURONIUM BROMIDE 20 MG: 10 INJECTION, SOLUTION INTRAVENOUS at 11:24

## 2023-09-15 RX ADMIN — PHENYLEPHRINE HYDROCHLORIDE 40 MCG/MIN: 10 INJECTION INTRAVENOUS at 10:43

## 2023-09-15 RX ADMIN — SODIUM CHLORIDE, SODIUM LACTATE, POTASSIUM CHLORIDE, AND CALCIUM CHLORIDE 50 ML/HR: .6; .31; .03; .02 INJECTION, SOLUTION INTRAVENOUS at 09:24

## 2023-09-15 RX ADMIN — ROCURONIUM BROMIDE 30 MG: 10 INJECTION, SOLUTION INTRAVENOUS at 10:26

## 2023-09-15 RX ADMIN — ONDANSETRON 4 MG: 2 INJECTION INTRAMUSCULAR; INTRAVENOUS at 12:31

## 2023-09-15 RX ADMIN — SODIUM CHLORIDE, SODIUM LACTATE, POTASSIUM CHLORIDE, AND CALCIUM CHLORIDE: .6; .31; .03; .02 INJECTION, SOLUTION INTRAVENOUS at 10:20

## 2023-09-15 NOTE — OP NOTE
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 CP MYOTOMY/ENDOSCOPIC CO2 LASER.  POSSIBLE ESOPHAGOSCOPY POSSIBLE ESOPHAGOSCOPY DILATION  MYOTOMY CRICOPHARYNGEAL  LASER LARYNGOSCOPY    Specimen(s):  * No specimens in log *    Estimated Blood Loss:   Minimal    Drains:  Gastrostomy/Enterostomy Percutaneous Endoscopic Gastrostomy (PEG) 20 Fr. 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 This cut was continued through the submucosal tissue until the muscle fibers of the cricopharyngeus were encountered. These fibers were carefully divided completely until the deeper buccopharyngeal fascia was encountered. It was preserved. Hemostasis was then maintained with laser, oxymetazoline and epi soaked pledgets and coblator to address mucosal bleeding. Hemostasis was confirmed with valsalva. The weerda laryngoscope and tooth guard were removed. Care of patient was returned to anesthesia for awakening/extubation. She was then taken to the PACU for recovery. I was present for the entire procedure.     Patient Disposition:  PACU         SIGNATURE: Reema Rosas MD  DATE: September 15, 2023  TIME: 10:22 AM

## 2023-09-15 NOTE — INTERIM OP NOTE
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

## 2023-09-15 NOTE — ANESTHESIA POSTPROCEDURE EVALUATION
Post-Op Assessment Note    CV Status:  Stable    Pain management: adequate     Mental Status:  Alert and awake   Hydration Status:  Euvolemic   PONV Controlled:  Controlled   Airway Patency:  Patent      Post Op Vitals Reviewed: Yes      Staff: CRNA, Anesthesiologist         No notable events documented.     BP   161/75   Temp   97.5   Pulse  88   Resp   14   SpO2   100

## 2023-09-15 NOTE — DISCHARGE INSTR - AVS FIRST PAGE
Resume previous diet (liquids) with Gtube nutrition    Tylenol or Ibuprofen as needed for pain    Liquid oxycodone if needed for breakthrough pain    Clindamycin as prescribed    Monitor for signs of infection - increasing pain, swelling, increased heart rate, temperature above 101 F.   Notify Dr. Rosenda Ochoa if this occurs ([phone])    Follow up with Dr. Rosenda Ochoa in 1 week

## 2023-09-15 NOTE — ANESTHESIA PREPROCEDURE EVALUATION
Procedure:  MICRO DIRECT LARYNGOSCOPY, CP MYOTOMY/ENDOSCOPIC CO2 LASER, POSSIBLE ESOPHAGOSCOPY POSSIBLE ESOPHAGOSCOPY DILATION (Throat)  MYOTOMY CRICOPHARYNGEAL (Throat)  LASER LARYNGOSCOPY (Throat)    Relevant Problems   CARDIO   (+) RBBB      ENDO   (+) Hypothyroidism due to Hashimoto's thyroiditis      GI/HEPATIC   (+) Dysphagia   (+) Gastroesophageal reflux disease without esophagitis      HEMATOLOGY   (+) Anemia      MUSCULOSKELETAL   (+) Acute low back pain   (+) Rheumatoid arthritis involving multiple sites with positive rheumatoid factor (HCC)      NEURO/PSYCH   (+) Anxiety and depression   (+) Chronic pain syndrome   (+) Continuous opioid dependence (HCC)        Physical Exam    Airway    Mallampati score: III  TM Distance: >3 FB  Neck ROM: full     Dental   Comment: Poor dentition,     Cardiovascular  Cardiovascular exam normal    Pulmonary  Pulmonary exam normal     Other Findings    Patient ambulates with walker but has been having a hard time since the last 1 month with walking due to weakness in her legs    SUMMARY     LEFT VENTRICLE:  Systolic function was normal. Ejection fraction was estimated to be 55 %. There were no regional wall motion abnormalities.     MITRAL VALVE:  There was likely moderate regurgitation.     TRICUSPID VALVE:  There was mild to moderate regurgitation. Pulmonary artery systolic pressure was within the normal range.     PERICARDIUM:  There was no pericardial effusion. Anesthesia Plan  ASA Score- 3     Anesthesia Type- general with ASA Monitors. Additional Monitors:   Airway Plan: ETT. Plan Factors-Exercise tolerance (METS): <4 METS. Chart reviewed. EKG reviewed. Existing labs reviewed. Patient summary reviewed. Patient is not a current smoker. Induction- intravenous. Postoperative Plan-     Informed Consent- Anesthetic plan and risks discussed with patient. I personally reviewed this patient with the CRNA.  Discussed and agreed on the Anesthesia Plan with the GÉNESIS Ruiz

## 2023-09-15 NOTE — H&P
Otolaryngology - Head and Neck Surgery  New Visit      Adriana Whitehead is a 77 y.o. who presented for evaluation of dysphagia. HPI:    Here for surgery    Prior hx: She was initially seen in [facility] for dysphagia  Admitted 6/25 for generalized weakness, not eating anything for the last 2 weeks  Prominent CP bar was noted on VBS on 6/27/23, 2/7/23  PEG tube was placed due to malnutrition    EGD 6/28/23 with PEG placement- normal z line, Schatzki's ring, 12mm balloon dilation of cricopharyngeus. VBS personally reviewed  Large obstructing CP bar  Oral - disorganized chewing, disorganized tongue motion, oral residue  Pharyngeal-  Aspiration of all consistencies; VPI, min movt of larynx, weak pharyngeal contractions, bilateral pharyngeal bulging, marked obstruction of flow through UES, residue vallecula, piriform sinuses  Esophageal - UES/CP bar, retentin/retrograde flow below UES          Historical Information   Past Medical History:   Diagnosis Date   • Acute blood loss anemia 09/09/2020   • Aftercare following joint replacement 09/09/2020    Patient had right reversed total shoulder arthroplasty.  Pain was controlled when he left the hospital.     • Anxiety    • Arthritis    • Cellulitis of left lower extremity 11/30/2019   • Depression    • Disease of thyroid gland     HYPO   • Dyspepsia 11/12/2019   • Dysphagia 02/06/2023   • Femur neck fracture (HCC)    • GERD (gastroesophageal reflux disease)    • Hyperthyroidism     RESOLVED: [id number]   • Hyponatremia 07/25/2022   • Kidney stone    • Leg pain 02/06/2023   • Lupus ([address])    • Osteoporosis    • Polio    • PVD (peripheral vascular disease) ([facility])    • RA (rheumatoid arthritis) ([address])    • Right BBB/left ant fasc block    • Shoulder pain, bilateral 07/27/2021   • UTI (urinary tract infection)    • Varicella infection     LAST ASSESSED: [id number]     Past Surgical History:   Procedure Laterality Date   • APPENDECTOMY     • HIP ARTHROPLASTY Left 11/27/2017    Procedure: REMOVAL IM NAIL CONVERSION TO TOTAL HIP ARTHROPLASTY POSTERIOR APPROACH;  Surgeon: Seun Marie MD;  [facility];  Service: Orthopedics   • HIP FRACTURE SURGERY     • HIP SURGERY      both hips replaced;2013 left ,2014 right   • JOINT REPLACEMENT Right     HIP TOTAL   • [place] ARTHROPLASTY GLENOHUMERAL JOINT TOTAL SHOULDER Right 09/02/2020    Procedure: ARTHROPLASTY SHOULDER REVERSE;  Surgeon: Lexx Ruggiero MD;  [facility];  Service: Orthopedics   • [place] ARTHROPLASTY GLENOHUMERAL JOINT TOTAL SHOULDER Left 06/01/2021    Procedure: ARTHROPLASTY SHOULDER REVERSE;  Surgeon: Lexx Ruggiero MD;  [facility];  Service: Orthopedics   • [place] CONV PREV HIP TOT HIP ARTHRP W/WO AGRFT/ALGRFT Left 10/04/2017    Procedure: Hareware removal of left hip;  Surgeon: Seun Marie MD;  [facility];  Service: Orthopedics   • [place] [id number] WRST SURG W/RLS TRANSVRS CARPL LIGM Right 05/24/2022    Procedure: RELEASE CARPAL TUNNEL ENDOSCOPIC-right;  Surgeon: Garfield Leger MD;  [facility];  Service: Orthopedics   • REVISION TOTAL HIP ARTHROPLASTY Right    • SHOULDER SURGERY Right    • TOE AMPUTATION Left 07/24/2022    Procedure: 5TH METATARSAL RESECTION WITH BOTTOM FLAP CLOSURE;  Surgeon: Liborio Harman DPM;  Location[facility];  Service: Podiatry   • TONSILLECTOMY       Social History   Social History     Substance and Sexual Activity   Alcohol Use Not Currently     Social History     Substance and Sexual Activity   Drug Use Not Currently     Social History     Tobacco Use   Smoking Status Former   Smokeless Tobacco Never   Tobacco Comments    quit 20-30 years ago     Family History   Problem Relation Age of Onset   • Rheum arthritis Mother    • Rheum arthritis Sister    • Prostate cancer Brother        Meds/Allergies     No current facility-administered medications on file prior to encounter.      Current Outpatient Medications on File Prior to Encounter Medication Sig Dispense Refill   • acetaminophen (TYLENOL) 650 mg CR tablet Take 2 tablets (1,300 mg total) by mouth 3 (three) times a day (Patient taking differently: Take 1,300 mg by mouth in the morning) 30 tablet 0   • Cholecalciferol 50 MCG (2000 UT) TBDP 2 tablets (4,000 Units total) by Per PEG Tube route daily 963 tablet 3   • folic acid (FOLVITE) 1 mg tablet 2 tablets (2 mg total) by Per PEG Tube route daily 60 tablet 0   • gabapentin (NEURONTIN) 100 mg capsule 2 capsules (200 mg total) by Per PEG Tube route 3 (three) times a day 180 capsule 0   • levothyroxine 75 mcg tablet 1 tablet (75 mcg total) by Per G Tube route daily in the early morning 30 tablet 0   • lidocaine (LMX) 4 % cream Apply topically as needed for mild pain 30 g 0   • Melatonin 5 MG CHEW 5 mg by Per PEG Tube route daily at bedtime 30 tablet 0   • Multiple Vitamins-Minerals (CENTRUM SILVER PO) Take 1 tablet by mouth daily     • nortriptyline (PAMELOR) 50 mg capsule 2 capsules (100 mg total) by Per PEG Tube route daily at bedtime 60 capsule 0   • [] oxyCODONE (Roxicodone) 5 immediate release tablet 0.5 tablets (2.5 mg total) by Per PEG Tube route 2 (two) times a day Max Daily Amount: 5 mg 30 tablet 0   • polyethylene glycol (MIRALAX) 17 g packet 17 g by Per PEG Tube route daily (Patient taking differently: 17 g by Per PEG Tube route if needed) 30 each 0   • hydroxychloroquine (PLAQUENIL) 200 mg tablet 1 tablet (200 mg total) by Per PEG Tube route every morning AND 0.5 tablets (100 mg total) daily at bedtime.  1 tab in am 1/2 tab in pm. 45 tablet 0   • senna-docusate sodium (SENOKOT S) 8.6-50 mg per tablet 1 tablet by Per G Tube route 2 (two) times a day as needed for constipation 30 tablet 0   • [DISCONTINUED] Calcium Ascorbate (VITAMIN C) 500 mg tablet Take 500 mg by mouth daily         No Known Allergies      Physical exam:     /61 (BP Location: Left arm)   Pulse 68   Temp 98.2 °F (36.8 °C) (Temporal)   Resp 16   Ht 4' 9" (1.448 m)   Wt 46.7 kg (103 lb)   SpO2 100%   BMI 22.29 kg/m²     Gen: NAD, cooperative, well nourished  Voice: slightly raspy  Head: normocephalic, atraumatic  Face: no facial asymmetry  TMJ: Nontender, no crepitus, no subluxation  Eyes: without edema or ecchymosis  Ears:     Right: Pinna nontender, nonerythematous. EAC with cerumen impaction. Left:   Pinna nontender, nonerythematous. EAC patent without lesions. TM intact, translucent, mobile. No OME. Hearing assessment: Normal hearing with finger rub. Balance assessment: wheelchair   Nose: External nose without lesions. Nares patent. No pus. No polyps. Nasal septum mildly deviated. Inferior turbinates pink and without hypertrophy. Sinuses: No tenderness to palpation of maxillary and frontal sinuses  Oral cavity: No lesions/masses. Tongue mobile and soft. No abnormality of parotid ducts. Oropharynx: No lesions/masses. mild cobblestoning. Tonsils without ulceration or exudate. Neck: No LAD. No masses. Nontender. Salivary glands: Parotids nontender, non-enlarged. Submandibular glands nontender, non-enlarged. Thyroid: WIthout hypertrophy. No palpable nodules. Nontender  Neuro: Alert  CN III-IX, XI-XII grossly intact  Larynx: Inadequate view of the hypopharynx and larynx with indirect laryngoscopy. Pulm: CTAB nonlabored, no stridor  CV: RRR  Extremities warm/perfused  Abd soft/nontender      Procedures  PRIOR:     Strobovideolaryngoscopy ([id number])    Pre-Operative Diagnosis:  1. Dysphagia     Post-Operative Diagnosis:    1. Dysphagia   2. R vf paresis, mild, incl SLN  3. Thick mucus larynx  4. Pooling of secretions postcricoid/piriforms  5. reinkes edema bilaterally  6. Reflux laryngitis     Surgeon:   Sallie Lazcano MD    Anesthesia:     -Lidocaine 2%  -Oxymetazoline    Stroboscope:  Atmos  Flexible Endoscope:    chip tip  Rigid endoscope:      Operative Details: The procedure was performed in the endoscopy special procedures room.   A high resolution video laryngoscope was inserted transnasally and orally and suspended from the video system for magnification and documentation. Stroboscopic light at several frequencies and intensities was used. Strobovideolaryngoscopic findings are deferred to the following report because the separate procedure with rigid endoscopy produces much higher resolution and allows diagnosis of lesions not visible during flexible laryngoscopy and dynamic voice analysis. Voice:  Slightly raspy    Supraglottic Hyperfunction:    present, mild ant/post  Arytenoid Joint Movement:    Normal  Dysdiadochokinesis:     Absent  Arytenoids:   mild erythema, mild edema  Posterior Cobblestoning:  present mild      Right Vocal Fold:  Abduction:    Mild dec  Adduction:    Mild dec  Longitudinal Tension:  decreased      Left Vocal Fold:  Abduction:    Normal  Adduction:    Normal  Longitudinal Tension:   Normal        Strobovideolaryngoscopy with Magnification    Amplitude Symmetry:   Symmetric  Phase Symmetry:    Symmetric  Periodicity:    Regular    Glottic Closure:    Complete    Vocal Process Height:    Equal    Amplitude of Vocal Folds:  Right: Normal  Left: Normal    Wave Form of Vocal Folds:  Right: Normal  Left: Normal    Vibratory Function of Vocal Folds:  Right: Normal  Left: Normal    Vocal Fold Color:  Right: Normal  Left: Normal    Masses/Vibratory Margin Irregularities: Moderate Mynor's edema. Other Lesions: pooling postcricoid/piriforms    The procedure was concluded without complication and the laryngoscope was removed. Cerumen cleaning   Patient informed of the finding of cerumen impaction in the right ear obstructing visualization of the tympanic membrane. Recommended removal.  Patient was in agreement. Patient gave verbal consent. Cerumen removed from the right ear using curette.   Patient tolerated procedure well without complications  Right EAC patent, TM intact, no OME    Assessment:    Diagnosis ICD-10-CM Associated Orders   1. Pharyngoesophageal dysphagia  R13.14       2. Cricopharyngeal hypertrophy  J39.2       3. Mynor's edema of vocal folds  J38.1       4. Paresis of right vocal fold  J38.01       5. Reflux laryngitis  J04.0     K21.9       6. Impacted cerumen of right ear  H61.21       7. At risk for aspiration  Z91.89       8. Esophageal dysmotility  K22.4       9. Gastroesophageal reflux disease without esophagitis  K21.9       10. Schatzki's ring  K22.2       11. Moderate protein-calorie malnutrition (HCC)  E44.0       12. Hypothyroidism due to Hashimoto's thyroiditis  E03.8     E06.3       13. Rheumatoid arthritis involving multiple sites with positive rheumatoid factor ([facility])  M05.79       14. S/P percutaneous endoscopic gastrostomy (PEG) tube placement ([facility])  Z93.1           Plan:    Dysphagia with large CP bar  Also with some oral/pharyngeal/mid-distal esophageal dysphagia  Aspiration on most recent VBS  PEG dependent at this time  Reflux a contributing factor also     Famotidine 20 bid via PEG    Surgery discussed including RBA of MDL, endoscopic CO2 laser CP myotomy, possible esophagoscopy, possible dilation. Risks discussed include but not limited to need for additional surgery, pain, bleeding, infection, oral/pharyngeal/esophageal injury, tongue pain/swelling/numbness, airway fire. She would like to proceed at this time. Discussed need for modified diet following surgery vs NPO and possible repeat swallow study prior to advancing.    Medical clearance   Currently not on blood thinners     Continue to work with SLP for swallow    She will return to my office postop    I spent 45 min with the patient

## 2023-09-18 ENCOUNTER — TELEPHONE (OUTPATIENT)
Dept: INTERNAL MEDICINE CLINIC | Facility: CLINIC | Age: 78
End: 2023-09-18

## 2023-09-18 NOTE — TELEPHONE ENCOUNTER
Juan Manuel Haro from the coroners office called to speak with you about signing the death certificate for [place].   Please call her at [phone]

## 2023-09-18 NOTE — TELEPHONE ENCOUNTER
Sherita Hwang would prefer to speak with you for a few minutes. Can be any time today on that number. I gave her the fax number also.

## 2024-12-29 NOTE — TELEPHONE ENCOUNTER
Grounding pads were placed on the left hip Order verification     Paged on call Dr Estela Hutchinson

## 2025-03-26 ENCOUNTER — DOCUMENTATION (OUTPATIENT)
Dept: ADMINISTRATIVE | Facility: OTHER | Age: 80
End: 2025-03-26

## 2025-03-26 NOTE — PROGRESS NOTES
Annual Wellness Visit outreach is not required, patient is .     Thank you.  MARIE LOOMIS MA  [facility]

## 2025-04-24 NOTE — PLAN OF CARE
Problem: Potential for Falls  Goal: Patient will remain free of falls  Description: INTERVENTIONS:  - Educate patient/family on patient safety including physical limitations  - Instruct patient to call for assistance with activity   - Consult OT/PT to assist with strengthening/mobility   - Keep Call bell within reach  - Keep bed low and locked with side rails adjusted as appropriate  - Keep care items and personal belongings within reach  - Initiate and maintain comfort rounds  - Make Fall Risk Sign visible to staff  - Offer Toileting every 3 Hours, in advance of need  - Initiate/Maintain 3alarm  - Obtain necessary fall risk management equipment: 3  - Apply yellow socks and bracelet for high fall risk patients  - Consider moving patient to room near nurses station  Outcome: Progressing     Problem: MOBILITY - ADULT  Goal: Maintain or return to baseline ADL function  Description: INTERVENTIONS:  -  Assess patient's ability to carry out ADLs; assess patient's baseline for ADL function and identify physical deficits which impact ability to perform ADLs (bathing, care of mouth/teeth, toileting, grooming, dressing, etc.)  - Assess/evaluate cause of self-care deficits   - Assess range of motion  - Assess patient's mobility; develop plan if impaired  - Assess patient's need for assistive devices and provide as appropriate  - Encourage maximum independence but intervene and supervise when necessary  - Involve family in performance of ADLs  - Assess for home care needs following discharge   - Consider OT consult to assist with ADL evaluation and planning for discharge  - Provide patient education as appropriate  Outcome: Progressing  Goal: Maintains/Returns to pre admission functional level  Description: INTERVENTIONS:  - Perform BMAT or MOVE assessment daily.   - Set and communicate daily mobility goal to care team and patient/family/caregiver.    - Collaborate with rehabilitation services on mobility goals if consulted  - Perform Range of Motion 3 times a day. - Reposition patient every 3 hours. - Dangle patient 3 times a day  - Stand patient 3 times a day  - Ambulate patient 3 times a day  - Out of bed to chair 3 times a day   - Out of bed for meals 3 times a day  - Out of bed for toileting  - Record patient progress and toleration of activity level   Outcome: Progressing     Problem: Prexisting or High Potential for Compromised Skin Integrity  Goal: Skin integrity is maintained or improved  Description: INTERVENTIONS:  - Identify patients at risk for skin breakdown  - Assess and monitor skin integrity  - Assess and monitor nutrition and hydration status  - Monitor labs   - Assess for incontinence   - Turn and reposition patient  - Assist with mobility/ambulation  - Relieve pressure over bony prominences  - Avoid friction and shearing  - Provide appropriate hygiene as needed including keeping skin clean and dry  - Evaluate need for skin moisturizer/barrier cream  - Collaborate with interdisciplinary team   - Patient/family teaching  - Consider wound care consult   Outcome: Progressing     Problem: Nutrition/Hydration-ADULT  Goal: Nutrient/Hydration intake appropriate for improving, restoring or maintaining nutritional needs  Description: Monitor and assess patient's nutrition/hydration status for malnutrition. Collaborate with interdisciplinary team and initiate plan and interventions as ordered. Monitor patient's weight and dietary intake as ordered or per policy. Utilize nutrition screening tool and intervene as necessary. Determine patient's food preferences and provide high-protein, high-caloric foods as appropriate.      INTERVENTIONS:  - Monitor oral intake, urinary output, labs, and treatment plans  - Assess nutrition and hydration status and recommend course of action  - Evaluate amount of meals eaten  - Assist patient with eating if necessary   - Allow adequate time for meals  - Recommend/ encourage appropriate diets, oral nutritional supplements, and vitamin/mineral supplements  - Order, calculate, and assess calorie counts as needed  - Recommend, monitor, and adjust tube feedings and TPN/PPN based on assessed needs  - Assess need for intravenous fluids  - Provide specific nutrition/hydration education as appropriate  - Include patient/family/caregiver in decisions related to nutrition  Outcome: Progressing [id number]

## (undated) DEVICE — HOOD: Brand: FLYTE, SURGICOOL

## (undated) DEVICE — RECIPROCATING BLADE HEAVY DUTY LONG, OFFSET  (77.6 X 0.77 X 11.2MM)

## (undated) DEVICE — THE SIMPULSE SOLO SYSTEM WITH ULTREX RETRACTABLE SPLASH SHIELD TIP: Brand: SIMPULSE SOLO

## (undated) DEVICE — SUT VICRYL PLUS 2-0 CTB-1 27 IN VCPB259H

## (undated) DEVICE — 3M™ IOBAN™ 2 ANTIMICROBIAL INCISE DRAPE 6650EZ: Brand: IOBAN™ 2

## (undated) DEVICE — VIOLET BRAIDED (POLYGLACTIN 910), SYNTHETIC ABSORBABLE SUTURE: Brand: COATED VICRYL

## (undated) DEVICE — CHLORAPREP HI-LITE 26ML ORANGE

## (undated) DEVICE — 3M™ STERI-DRAPE™ U-DRAPE 1015: Brand: STERI-DRAPE™

## (undated) DEVICE — PROXIMATE PLUS MD MULTI-DIRECTIONAL RELEASE SKIN STAPLERS CONTAINS 35 STAINLESS STEEL STAPLES APPROXIMATE CLOSED DIMENSIONS: 6.9MM X 3.9MM WIDE: Brand: PROXIMATE

## (undated) DEVICE — PACK TOTAL HIP PBDS

## (undated) DEVICE — BIPOLAR SEALER 23-301-1 AQM MBS: Brand: AQUAMANTYS™

## (undated) DEVICE — ABDOMINAL PAD: Brand: DERMACEA

## (undated) DEVICE — PLUMEPEN PRO 10FT

## (undated) DEVICE — INTENT TO BE USED WITH SUTURE MATERIAL FOR TISSUE CLOSURE: Brand: RICHARD-ALLAN®  NEEDLE 1/2 CIRCLE REVERSE CUTTING

## (undated) DEVICE — SILVER-COATED ANTIMICROBIAL BARRIER DRESSING: Brand: ACTICOAT   4" X 8"

## (undated) DEVICE — GLOVE SRG BIOGEL 7.5

## (undated) DEVICE — IMPERVIOUS STOCKINETTE: Brand: DEROYAL

## (undated) DEVICE — SUT VICRYL PLUS 0 CTB-1 27 IN VCPB260H

## (undated) DEVICE — OCCLUSIVE GAUZE STRIP,3% BISMUTH TRIBROMOPHENATE IN PETROLATUM BLEND: Brand: XEROFORM

## (undated) DEVICE — DRAPE EQUIPMENT RF WAND

## (undated) DEVICE — HEAVY DUTY TABLE COVER: Brand: CONVERTORS

## (undated) DEVICE — NEURO PATTIES 1/2 X 1 1/2

## (undated) DEVICE — DRESSING MEPILEX AG BORDER 4 X 8 IN

## (undated) DEVICE — POSITIONER HANA TABLE PACK

## (undated) DEVICE — MEDI-VAC YANKAUER SUCTION HANDLE W/STRAIGHT TIP & CONTROL VENT: Brand: CARDINAL HEALTH

## (undated) DEVICE — PROCISE MLW WAND: Brand: COBLATION

## (undated) DEVICE — INTENDED FOR TISSUE SEPARATION, AND OTHER PROCEDURES THAT REQUIRE A SHARP SURGICAL BLADE TO PUNCTURE OR CUT.: Brand: BARD-PARKER SAFETY BLADES SIZE 10, STERILE

## (undated) DEVICE — STOCKINETTE REGULAR

## (undated) DEVICE — SCD SEQUENTIAL COMPRESSION COMFORT SLEEVE MEDIUM KNEE LENGTH: Brand: KENDALL SCD

## (undated) DEVICE — STRL COTTON TIP APPLCTR 6IN PK: Brand: CARDINAL HEALTH

## (undated) DEVICE — PACK MAJOR ORTHO W/SPLITS PBDS

## (undated) DEVICE — ACE WRAP 4 IN UNSTERILE

## (undated) DEVICE — DRAPE C-ARM X-RAY

## (undated) DEVICE — VEST SURGEON DISPOSABLE

## (undated) DEVICE — PRECISION FALCON 90.0MM OSCILLATING TIP SAW CARTRIDGE: Brand: PRECISION FALCON

## (undated) DEVICE — GLOVE INDICATOR PI UNDERGLOVE SZ 8.5 BLUE

## (undated) DEVICE — PENCIL ELECTROSURG E-Z CLEAN -0035H

## (undated) DEVICE — NEEDLE 25G X 1 1/2

## (undated) DEVICE — DISPOSABLE EQUIPMENT COVER: Brand: SMALL TOWEL DRAPE

## (undated) DEVICE — BETHLEHEM UNIVERSAL OUTPATIENT: Brand: CARDINAL HEALTH

## (undated) DEVICE — CHLORAPREP HI-LITE 10.5ML ORANGE

## (undated) DEVICE — SPONGE PVP SCRUB WING STERILE

## (undated) DEVICE — TRAY FOLEY 16FR URIMETER SURESTEP

## (undated) DEVICE — GAUZE SPONGES,16 PLY: Brand: CURITY

## (undated) DEVICE — SUT ETHIBOND 1 CT-1 30 IN X425H

## (undated) DEVICE — CUFF TOURNIQUET 18 X 4 IN QUICK CONNECT DISP 1 BLADDER

## (undated) DEVICE — GLOVE INDICATOR PI UNDERGLOVE SZ 8 BLUE

## (undated) DEVICE — ACE WRAP 4 IN STERILE

## (undated) DEVICE — Device: Brand: BOOT LINER, DISPOSABLE

## (undated) DEVICE — STRETCH BANDAGE: Brand: CURITY

## (undated) DEVICE — HOOD: Brand: FLYTE

## (undated) DEVICE — COBAN 4 IN STERILE

## (undated) DEVICE — ANTI-FOG SOLUTION WITH FOAM PAD: Brand: DEVON

## (undated) DEVICE — DUAL CUT SAGITTAL BLADE

## (undated) DEVICE — DRAPE C-ARMOUR

## (undated) DEVICE — DRAPE SHEET X-LG

## (undated) DEVICE — GUIDE PIN 2.5 X 220MM AEQUALIS PERFORM PLUS

## (undated) DEVICE — GLOVE INDICATOR PI UNDERGLOVE SZ 7.5 BLUE

## (undated) DEVICE — X-RAY DETECTABLE SPONGES,16 PLY: Brand: VISTEC

## (undated) DEVICE — STERILE BETHLEHEM PLASTIC HAND: Brand: CARDINAL HEALTH

## (undated) DEVICE — BURR OVAL 4 MM C0901

## (undated) DEVICE — TUBING SUCTION 5MM X 12 FT

## (undated) DEVICE — BULB SYRINGE,IRRIGATION WITH PROTECTIVE CAP: Brand: DOVER

## (undated) DEVICE — CAPIT ASCEND FLEX REVERSE W PERFORM

## (undated) DEVICE — KIT STABILIZATION SHOULDER MARCO

## (undated) DEVICE — U-DRAPE: Brand: CONVERTORS

## (undated) DEVICE — CLAMP TOWEL TUBING DISPOSABLE

## (undated) DEVICE — ADHESIVE SKN CLSR HISTOACRYL FLEX 0.5ML LF

## (undated) DEVICE — 3M™ TEGADERM™ TRANSPARENT FILM DRESSING FRAME STYLE, 1628, 6 IN X 8 IN (15 CM X 20 CM), 10/CT 8CT/CASE: Brand: 3M™ TEGADERM™

## (undated) DEVICE — BLADE ELECTRODE: Brand: EDGE

## (undated) DEVICE — SPECIMEN CONTAINER STERILE PEEL PACK

## (undated) DEVICE — UNIVERSAL MAJOR EXTREMITY,KIT: Brand: CARDINAL HEALTH

## (undated) DEVICE — 450 ML BOTTLE OF 0.05% CHLORHEXIDINE GLUCONATE IN 99.95% STERILE WATER FOR IRRIGATION, USP AND APPLICATOR.: Brand: IRRISEPT ANTIMICROBIAL WOUND LAVAGE

## (undated) DEVICE — SUT 2 ORTHOCORD MO7

## (undated) DEVICE — SUT VICRYL PLUS 1 CTB-1 36 IN VCPB947H

## (undated) DEVICE — SUT 2 ORTHOCORD 36 IN W/O NEEDLES

## (undated) DEVICE — INTENDED FOR TISSUE SEPARATION, AND OTHER PROCEDURES THAT REQUIRE A SHARP SURGICAL BLADE TO PUNCTURE OR CUT.: Brand: BARD-PARKER SAFETY BLADES SIZE 15, STERILE

## (undated) DEVICE — PADDING CAST 2IN COTTON STRL

## (undated) DEVICE — SYRINGE 10ML LL

## (undated) DEVICE — GLOVE SRG BIOGEL ORTHOPEDIC 8.5

## (undated) DEVICE — RETROGRADE KNIFE BOX OF 6: Brand: ECTRA

## (undated) DEVICE — DRILL BIT 3.2MM

## (undated) DEVICE — ARTHROSCOPY FLOOR MAT

## (undated) DEVICE — PAD GROUNDING ADULT

## (undated) DEVICE — SCREW CENTRAL 6.5 X 25MM: Type: IMPLANTABLE DEVICE | Site: SHOULDER | Status: NON-FUNCTIONAL

## (undated) DEVICE — CAPIT ASCEND FLEX REVERSE W/GLENOID

## (undated) DEVICE — SYRINGE 50ML LL